# Patient Record
Sex: MALE | Race: BLACK OR AFRICAN AMERICAN | NOT HISPANIC OR LATINO | URBAN - METROPOLITAN AREA
[De-identification: names, ages, dates, MRNs, and addresses within clinical notes are randomized per-mention and may not be internally consistent; named-entity substitution may affect disease eponyms.]

---

## 2017-02-28 ENCOUNTER — EMERGENCY (EMERGENCY)
Facility: HOSPITAL | Age: 62
LOS: 1 days | Discharge: HOME | End: 2017-02-28
Attending: EMERGENCY MEDICINE | Admitting: EMERGENCY MEDICINE
Payer: MEDICAID

## 2017-02-28 VITALS
OXYGEN SATURATION: 100 % | HEART RATE: 73 BPM | TEMPERATURE: 98 F | RESPIRATION RATE: 18 BRPM | DIASTOLIC BLOOD PRESSURE: 76 MMHG | SYSTOLIC BLOOD PRESSURE: 148 MMHG

## 2017-02-28 PROCEDURE — 10061 I&D ABSCESS COMP/MULTIPLE: CPT

## 2017-02-28 PROCEDURE — 99283 EMERGENCY DEPT VISIT LOW MDM: CPT | Mod: 25

## 2017-02-28 RX ADMIN — Medication 300 MILLIGRAM(S): at 15:40

## 2017-02-28 NOTE — ED PROVIDER NOTE - DETAILS:
I've seen and examined the patient and dictated the chart to the scribe, I agree with the documentation.   Resident also on the case, he performed procedure under my supervision.

## 2017-02-28 NOTE — ED PROVIDER NOTE - PLAN OF CARE
-- Return to ED or see your doctor in 2 days for wound recheck and/or packing change.    -- Keep the wound covered and dry.     -- Once a day, wash the wound with soap/water, apply bacitracin or neosporin and re-cover the wound.  -- If you have any worsening of symptoms, including severe pain/swelling/numbness/changes in sensation/weakness, redness which expands more than it is right now or any other concerns please return to the ED immediately.  -- FIll the prescription for Clindamycin 300mg by mouth every 6 hours for 1 week.  Please finish the entire course of medication as prescribed.  If you have any belly pain after the antibiotics, yogurt has been shown to help with this. Do not mix alcohol with this medication.

## 2017-02-28 NOTE — ED PROVIDER NOTE - CARE PLAN
Principal Discharge DX:	Abscess Principal Discharge DX:	Abscess  Instructions for follow-up, activity and diet:	-- Return to ED or see your doctor in 2 days for wound recheck and/or packing change.    -- Keep the wound covered and dry.     -- Once a day, wash the wound with soap/water, apply bacitracin or neosporin and re-cover the wound.  -- If you have any worsening of symptoms, including severe pain/swelling/numbness/changes in sensation/weakness, redness which expands more than it is right now or any other concerns please return to the ED immediately.  -- FIll the prescription for Clindamycin 300mg by mouth every 6 hours for 1 week.  Please finish the entire course of medication as prescribed.  If you have any belly pain after the antibiotics, yogurt has been shown to help with this. Do not mix alcohol with this medication.

## 2017-02-28 NOTE — ED PROVIDER NOTE - OBJECTIVE STATEMENT
63 y/o M pt with PMHx of DM, HTN, and kidney disease presents to the ED for abscess on back x2 weeks. States today abscess started draining by itself. Was evaluated by PCP who sent him to the ED because he also had fever. Pt states last year he had abscess in the same place and he was given abx for it. Denies chill, CP, abd pain, nausea, vomiting or any other symptoms.

## 2017-03-02 ENCOUNTER — EMERGENCY (EMERGENCY)
Facility: HOSPITAL | Age: 62
LOS: 1 days | Discharge: HOME | End: 2017-03-02
Attending: EMERGENCY MEDICINE | Admitting: EMERGENCY MEDICINE

## 2017-03-02 VITALS
DIASTOLIC BLOOD PRESSURE: 80 MMHG | OXYGEN SATURATION: 100 % | SYSTOLIC BLOOD PRESSURE: 148 MMHG | TEMPERATURE: 98 F | RESPIRATION RATE: 16 BRPM | HEART RATE: 72 BPM

## 2017-03-02 NOTE — ED PROVIDER NOTE - SKIN, MLM
4 cm by 4 cm area of induration on back. Packing in place. Mild discoloration, active minimal purulent drainage.

## 2017-03-02 NOTE — ED PROVIDER NOTE - MEDICAL DECISION MAKING DETAILS
61 yo M pt presenting for wound check. Abscess and cellulitis healing nicely. Packing removed. Continue abx. Recommend f/u PMD.

## 2017-03-02 NOTE — ED PROVIDER NOTE - OBJECTIVE STATEMENT
63 yo M pt w/ PMHx of DM, HTN, Kidney Disease presents to the ED for a wound check. Pt was seen in [facility] ED for abscess on back with associated fever x 2 weeks on 2/28/17 and had I&D performed, wound was packed. Pt was DC'ed home on Clindamycin and instructed to return to ED 4 days later for check. Denies fevers, chills, nausea, vomiting, any other symptoms.

## 2017-03-26 ENCOUNTER — EMERGENCY (EMERGENCY)
Facility: HOSPITAL | Age: 62
LOS: 1 days | Discharge: HOME | End: 2017-03-26
Attending: EMERGENCY MEDICINE | Admitting: EMERGENCY MEDICINE
Payer: MEDICAID

## 2017-03-26 VITALS
DIASTOLIC BLOOD PRESSURE: 98 MMHG | SYSTOLIC BLOOD PRESSURE: 192 MMHG | TEMPERATURE: 98 F | RESPIRATION RATE: 18 BRPM | HEART RATE: 79 BPM | OXYGEN SATURATION: 100 %

## 2017-03-26 PROCEDURE — 99283 EMERGENCY DEPT VISIT LOW MDM: CPT | Mod: 25

## 2017-03-26 NOTE — ED PROVIDER NOTE - MEDICAL DECISION MAKING DETAILS
62M in with profuse diarrhea after taking clindamycin for abscess, cbc, cmp, vbg, ivf, c-diff, possible abx, dc home

## 2017-03-26 NOTE — ED ADULT TRIAGE NOTE - CHIEF COMPLAINT QUOTE
pt c/o diarrhea x 1week s/p taking antibiotics for abscess drainage. denies any other symptoms. PMHX: dm, Right BKA, partial blindness.

## 2017-03-26 NOTE — ED PROVIDER NOTE - OBJECTIVE STATEMENT
63 yo M pt w/ PMHx of DM, HTN, Kidney Disease 63 yo M pt w/ PMHx of DM, HTN, Kidney Disease in with 3 days of diarrhea, was recently seed in the ED for back abscess, started on clindamycin which he completed. Endorses some crampy abd pain improved with defecation, also endorses black colored stool although is on pepto bismol as well.  Denies fever, nausea, vomiting, sick contacts, recent travel. 61 yo M pt w/ PMHx of DM, HTN, Kidney Disease in with 3 days of diarrhea, was recently seed in the ED for back abscess, started on clindamycin which he completed. Endorses some crampy abd pain improved with defecation, also endorses black colored stool although is on pepto bismol as well.  Denies fever, nausea, vomiting, sick contacts, recent travel.    Attendinyo male presents with diarrhea since Thursday.  No vomiting.  No abdominal pain.  States having diarrhea which is not pure water.  States it is sometimes semi-solid.  No fever/chills.

## 2017-03-26 NOTE — ED ADULT NURSE NOTE - OBJECTIVE STATEMENT
pt presented to ED with c/o diarrhea x 4 days. pt started antibiotic therapy x 4 days s/p drainage of abscess of back. no c/o pain at this time. pt has AKA.

## 2017-03-27 VITALS
RESPIRATION RATE: 18 BRPM | HEART RATE: 81 BPM | OXYGEN SATURATION: 100 % | DIASTOLIC BLOOD PRESSURE: 87 MMHG | SYSTOLIC BLOOD PRESSURE: 156 MMHG

## 2017-03-27 LAB
ALBUMIN SERPL ELPH-MCNC: 3.9 G/DL — SIGNIFICANT CHANGE UP (ref 3.3–5)
ALP SERPL-CCNC: 81 U/L — SIGNIFICANT CHANGE UP (ref 40–120)
ALT FLD-CCNC: 31 U/L — SIGNIFICANT CHANGE UP (ref 4–41)
AST SERPL-CCNC: 32 U/L — SIGNIFICANT CHANGE UP (ref 4–40)
BASE EXCESS BLDV CALC-SCNC: -1 MMOL/L — SIGNIFICANT CHANGE UP
BASE EXCESS BLDV CALC-SCNC: -1.8 MMOL/L — SIGNIFICANT CHANGE UP
BASOPHILS # BLD AUTO: 0.05 K/UL — SIGNIFICANT CHANGE UP (ref 0–0.2)
BASOPHILS NFR BLD AUTO: 0.6 % — SIGNIFICANT CHANGE UP (ref 0–2)
BILIRUB SERPL-MCNC: 0.4 MG/DL — SIGNIFICANT CHANGE UP (ref 0.2–1.2)
BLOOD GAS VENOUS - CREATININE: 1.31 MG/DL — HIGH (ref 0.5–1.3)
BLOOD GAS VENOUS - CREATININE: 1.39 MG/DL — HIGH (ref 0.5–1.3)
BUN SERPL-MCNC: 24 MG/DL — HIGH (ref 7–23)
CALCIUM SERPL-MCNC: 10 MG/DL — SIGNIFICANT CHANGE UP (ref 8.4–10.5)
CHLORIDE BLDV-SCNC: 106 MMOL/L — SIGNIFICANT CHANGE UP (ref 96–108)
CHLORIDE BLDV-SCNC: 109 MMOL/L — HIGH (ref 96–108)
CHLORIDE SERPL-SCNC: 100 MMOL/L — SIGNIFICANT CHANGE UP (ref 98–107)
CO2 SERPL-SCNC: 16 MMOL/L — LOW (ref 22–31)
CREAT SERPL-MCNC: 1.51 MG/DL — HIGH (ref 0.5–1.3)
EOSINOPHIL # BLD AUTO: 0.27 K/UL — SIGNIFICANT CHANGE UP (ref 0–0.5)
EOSINOPHIL NFR BLD AUTO: 3.1 % — SIGNIFICANT CHANGE UP (ref 0–6)
GAS PNL BLDV: 136 MMOL/L — SIGNIFICANT CHANGE UP (ref 136–146)
GAS PNL BLDV: 137 MMOL/L — SIGNIFICANT CHANGE UP (ref 136–146)
GLUCOSE BLDV-MCNC: 77 — SIGNIFICANT CHANGE UP (ref 70–99)
GLUCOSE BLDV-MCNC: 95 — SIGNIFICANT CHANGE UP (ref 70–99)
GLUCOSE SERPL-MCNC: 94 MG/DL — SIGNIFICANT CHANGE UP (ref 70–99)
HCO3 BLDV-SCNC: 21 MMOL/L — SIGNIFICANT CHANGE UP (ref 20–27)
HCO3 BLDV-SCNC: 23 MMOL/L — SIGNIFICANT CHANGE UP (ref 20–27)
HCT VFR BLD CALC: 33.8 % — LOW (ref 39–50)
HCT VFR BLDV CALC: 35 % — LOW (ref 39–51)
HCT VFR BLDV CALC: 35.4 % — LOW (ref 39–51)
HGB BLD-MCNC: 11.1 G/DL — LOW (ref 13–17)
HGB BLDV-MCNC: 11.4 G/DL — LOW (ref 13–17)
HGB BLDV-MCNC: 11.5 G/DL — LOW (ref 13–17)
IMM GRANULOCYTES NFR BLD AUTO: 0.2 % — SIGNIFICANT CHANGE UP (ref 0–1.5)
LACTATE BLDV-MCNC: 1.8 MMOL/L — SIGNIFICANT CHANGE UP (ref 0.5–2)
LACTATE BLDV-MCNC: 4.3 MMOL/L — CRITICAL HIGH (ref 0.5–2)
LYMPHOCYTES # BLD AUTO: 1.5 K/UL — SIGNIFICANT CHANGE UP (ref 1–3.3)
LYMPHOCYTES # BLD AUTO: 17.2 % — SIGNIFICANT CHANGE UP (ref 13–44)
MCHC RBC-ENTMCNC: 27.6 PG — SIGNIFICANT CHANGE UP (ref 27–34)
MCHC RBC-ENTMCNC: 32.8 % — SIGNIFICANT CHANGE UP (ref 32–36)
MCV RBC AUTO: 84.1 FL — SIGNIFICANT CHANGE UP (ref 80–100)
MONOCYTES # BLD AUTO: 1.2 K/UL — HIGH (ref 0–0.9)
MONOCYTES NFR BLD AUTO: 13.8 % — SIGNIFICANT CHANGE UP (ref 2–14)
NEUTROPHILS # BLD AUTO: 5.68 K/UL — SIGNIFICANT CHANGE UP (ref 1.8–7.4)
NEUTROPHILS NFR BLD AUTO: 65.1 % — SIGNIFICANT CHANGE UP (ref 43–77)
OB PNL STL: NEGATIVE — SIGNIFICANT CHANGE UP
PCO2 BLDV: 44 MMHG — SIGNIFICANT CHANGE UP (ref 41–51)
PCO2 BLDV: 61 MMHG — HIGH (ref 41–51)
PH BLDV: 7.23 PH — LOW (ref 7.32–7.43)
PH BLDV: 7.35 PH — SIGNIFICANT CHANGE UP (ref 7.32–7.43)
PLATELET # BLD AUTO: 261 K/UL — SIGNIFICANT CHANGE UP (ref 150–400)
PMV BLD: 10.2 FL — SIGNIFICANT CHANGE UP (ref 7–13)
PO2 BLDV: 27 MMHG — LOW (ref 35–40)
PO2 BLDV: 31 MMHG — LOW (ref 35–40)
POTASSIUM BLDV-SCNC: 3.9 MMOL/L — SIGNIFICANT CHANGE UP (ref 3.4–4.5)
POTASSIUM BLDV-SCNC: 4.8 MMOL/L — HIGH (ref 3.4–4.5)
POTASSIUM SERPL-MCNC: 4.9 MMOL/L — SIGNIFICANT CHANGE UP (ref 3.5–5.3)
POTASSIUM SERPL-SCNC: 4.9 MMOL/L — SIGNIFICANT CHANGE UP (ref 3.5–5.3)
PROT SERPL-MCNC: 8 G/DL — SIGNIFICANT CHANGE UP (ref 6–8.3)
RBC # BLD: 4.02 M/UL — LOW (ref 4.2–5.8)
RBC # FLD: 13.8 % — SIGNIFICANT CHANGE UP (ref 10.3–14.5)
SAO2 % BLDV: 37 % — LOW (ref 60–85)
SAO2 % BLDV: 55.2 % — LOW (ref 60–85)
SODIUM SERPL-SCNC: 141 MMOL/L — SIGNIFICANT CHANGE UP (ref 135–145)
WBC # BLD: 8.72 K/UL — SIGNIFICANT CHANGE UP (ref 3.8–10.5)
WBC # FLD AUTO: 8.72 K/UL — SIGNIFICANT CHANGE UP (ref 3.8–10.5)

## 2017-03-27 RX ORDER — SODIUM CHLORIDE 9 MG/ML
1000 INJECTION INTRAMUSCULAR; INTRAVENOUS; SUBCUTANEOUS ONCE
Refills: 0 | Status: COMPLETED | OUTPATIENT
Start: 2017-03-27 | End: 2017-03-27

## 2017-03-27 RX ADMIN — SODIUM CHLORIDE 4000 MILLILITER(S): 9 INJECTION INTRAMUSCULAR; INTRAVENOUS; SUBCUTANEOUS at 00:49

## 2017-04-14 ENCOUNTER — EMERGENCY (EMERGENCY)
Facility: HOSPITAL | Age: 62
LOS: 1 days | Discharge: HOME | End: 2017-04-14
Attending: EMERGENCY MEDICINE | Admitting: EMERGENCY MEDICINE
Payer: MEDICAID

## 2017-04-14 VITALS
WEIGHT: 154.1 LBS | OXYGEN SATURATION: 100 % | RESPIRATION RATE: 16 BRPM | HEART RATE: 81 BPM | DIASTOLIC BLOOD PRESSURE: 74 MMHG | SYSTOLIC BLOOD PRESSURE: 175 MMHG

## 2017-04-14 VITALS
RESPIRATION RATE: 18 BRPM | TEMPERATURE: 98 F | DIASTOLIC BLOOD PRESSURE: 88 MMHG | OXYGEN SATURATION: 100 % | SYSTOLIC BLOOD PRESSURE: 172 MMHG | HEART RATE: 79 BPM

## 2017-04-14 LAB
ALBUMIN SERPL ELPH-MCNC: 3.6 G/DL — SIGNIFICANT CHANGE UP (ref 3.3–5)
ALP SERPL-CCNC: 67 U/L — SIGNIFICANT CHANGE UP (ref 40–120)
ALT FLD-CCNC: 17 U/L — SIGNIFICANT CHANGE UP (ref 4–41)
APPEARANCE UR: CLEAR — SIGNIFICANT CHANGE UP
AST SERPL-CCNC: 19 U/L — SIGNIFICANT CHANGE UP (ref 4–40)
BASE EXCESS BLDV CALC-SCNC: 2.4 MMOL/L — SIGNIFICANT CHANGE UP
BASOPHILS # BLD AUTO: 0.03 K/UL — SIGNIFICANT CHANGE UP (ref 0–0.2)
BASOPHILS NFR BLD AUTO: 0.6 % — SIGNIFICANT CHANGE UP (ref 0–2)
BILIRUB SERPL-MCNC: 0.2 MG/DL — SIGNIFICANT CHANGE UP (ref 0.2–1.2)
BILIRUB UR-MCNC: NEGATIVE — SIGNIFICANT CHANGE UP
BLOOD GAS VENOUS - CREATININE: 1.46 MG/DL — HIGH (ref 0.5–1.3)
BLOOD UR QL VISUAL: NEGATIVE — SIGNIFICANT CHANGE UP
BUN SERPL-MCNC: 20 MG/DL — SIGNIFICANT CHANGE UP (ref 7–23)
CALCIUM SERPL-MCNC: 8.7 MG/DL — SIGNIFICANT CHANGE UP (ref 8.4–10.5)
CHLORIDE BLDV-SCNC: 104 MMOL/L — SIGNIFICANT CHANGE UP (ref 96–108)
CHLORIDE SERPL-SCNC: 101 MMOL/L — SIGNIFICANT CHANGE UP (ref 98–107)
CO2 SERPL-SCNC: 28 MMOL/L — SIGNIFICANT CHANGE UP (ref 22–31)
COLOR SPEC: SIGNIFICANT CHANGE UP
CREAT SERPL-MCNC: 1.43 MG/DL — HIGH (ref 0.5–1.3)
EOSINOPHIL # BLD AUTO: 0.25 K/UL — SIGNIFICANT CHANGE UP (ref 0–0.5)
EOSINOPHIL NFR BLD AUTO: 4.7 % — SIGNIFICANT CHANGE UP (ref 0–6)
GAS PNL BLDV: 139 MMOL/L — SIGNIFICANT CHANGE UP (ref 136–146)
GLUCOSE BLDV-MCNC: 160 — HIGH (ref 70–99)
GLUCOSE SERPL-MCNC: 157 MG/DL — HIGH (ref 70–99)
GLUCOSE UR-MCNC: NEGATIVE — SIGNIFICANT CHANGE UP
HCO3 BLDV-SCNC: 25 MMOL/L — SIGNIFICANT CHANGE UP (ref 20–27)
HCT VFR BLD CALC: 30.7 % — LOW (ref 39–50)
HCT VFR BLDV CALC: 30.8 % — LOW (ref 39–51)
HGB BLD-MCNC: 9.8 G/DL — LOW (ref 13–17)
HGB BLDV-MCNC: 10 G/DL — LOW (ref 13–17)
IMM GRANULOCYTES NFR BLD AUTO: 0.2 % — SIGNIFICANT CHANGE UP (ref 0–1.5)
KETONES UR-MCNC: NEGATIVE — SIGNIFICANT CHANGE UP
LACTATE BLDV-MCNC: 2.2 MMOL/L — HIGH (ref 0.5–2)
LEUKOCYTE ESTERASE UR-ACNC: NEGATIVE — SIGNIFICANT CHANGE UP
LYMPHOCYTES # BLD AUTO: 1.64 K/UL — SIGNIFICANT CHANGE UP (ref 1–3.3)
LYMPHOCYTES # BLD AUTO: 30.9 % — SIGNIFICANT CHANGE UP (ref 13–44)
MCHC RBC-ENTMCNC: 27.4 PG — SIGNIFICANT CHANGE UP (ref 27–34)
MCHC RBC-ENTMCNC: 31.9 % — LOW (ref 32–36)
MCV RBC AUTO: 85.8 FL — SIGNIFICANT CHANGE UP (ref 80–100)
MONOCYTES # BLD AUTO: 0.46 K/UL — SIGNIFICANT CHANGE UP (ref 0–0.9)
MONOCYTES NFR BLD AUTO: 8.7 % — SIGNIFICANT CHANGE UP (ref 2–14)
MUCOUS THREADS # UR AUTO: SIGNIFICANT CHANGE UP
NEUTROPHILS # BLD AUTO: 2.92 K/UL — SIGNIFICANT CHANGE UP (ref 1.8–7.4)
NEUTROPHILS NFR BLD AUTO: 54.9 % — SIGNIFICANT CHANGE UP (ref 43–77)
NITRITE UR-MCNC: NEGATIVE — SIGNIFICANT CHANGE UP
PCO2 BLDV: 57 MMHG — HIGH (ref 41–51)
PH BLDV: 7.31 PH — LOW (ref 7.32–7.43)
PH UR: 6 — SIGNIFICANT CHANGE UP (ref 4.6–8)
PLATELET # BLD AUTO: 326 K/UL — SIGNIFICANT CHANGE UP (ref 150–400)
PMV BLD: 10.1 FL — SIGNIFICANT CHANGE UP (ref 7–13)
PO2 BLDV: 25 MMHG — LOW (ref 35–40)
POTASSIUM BLDV-SCNC: 4.2 MMOL/L — SIGNIFICANT CHANGE UP (ref 3.4–4.5)
POTASSIUM SERPL-MCNC: 4.4 MMOL/L — SIGNIFICANT CHANGE UP (ref 3.5–5.3)
POTASSIUM SERPL-SCNC: 4.4 MMOL/L — SIGNIFICANT CHANGE UP (ref 3.5–5.3)
PROT SERPL-MCNC: 7.1 G/DL — SIGNIFICANT CHANGE UP (ref 6–8.3)
PROT UR-MCNC: 10 — SIGNIFICANT CHANGE UP
RBC # BLD: 3.58 M/UL — LOW (ref 4.2–5.8)
RBC # FLD: 13.8 % — SIGNIFICANT CHANGE UP (ref 10.3–14.5)
RBC CASTS # UR COMP ASSIST: SIGNIFICANT CHANGE UP (ref 0–?)
SAO2 % BLDV: 37.2 % — LOW (ref 60–85)
SODIUM SERPL-SCNC: 142 MMOL/L — SIGNIFICANT CHANGE UP (ref 135–145)
SP GR SPEC: 1.01 — SIGNIFICANT CHANGE UP (ref 1–1.03)
SQUAMOUS # UR AUTO: SIGNIFICANT CHANGE UP
UROBILINOGEN FLD QL: NORMAL E.U. — SIGNIFICANT CHANGE UP (ref 0.1–0.2)
WBC # BLD: 5.31 K/UL — SIGNIFICANT CHANGE UP (ref 3.8–10.5)
WBC # FLD AUTO: 5.31 K/UL — SIGNIFICANT CHANGE UP (ref 3.8–10.5)
WBC UR QL: SIGNIFICANT CHANGE UP (ref 0–?)

## 2017-04-14 PROCEDURE — 99285 EMERGENCY DEPT VISIT HI MDM: CPT

## 2017-04-14 RX ORDER — MORPHINE SULFATE 50 MG/1
4 CAPSULE, EXTENDED RELEASE ORAL ONCE
Refills: 0 | Status: DISCONTINUED | OUTPATIENT
Start: 2017-04-14 | End: 2017-04-14

## 2017-04-14 RX ORDER — SODIUM CHLORIDE 9 MG/ML
1000 INJECTION INTRAMUSCULAR; INTRAVENOUS; SUBCUTANEOUS ONCE
Refills: 0 | Status: COMPLETED | OUTPATIENT
Start: 2017-04-14 | End: 2017-04-14

## 2017-04-14 RX ADMIN — SODIUM CHLORIDE 1000 MILLILITER(S): 9 INJECTION INTRAMUSCULAR; INTRAVENOUS; SUBCUTANEOUS at 21:54

## 2017-04-14 NOTE — ED PROVIDER NOTE - PLAN OF CARE
-- Take flagyl (metronidazole) as prescribed. Do not drink alcohol while taking flagyl. Continue taking ciprofloxacin as prescribed.  -- Follow up with a GI doctor within 1 week. See referral list to make an appointment.  -- Return to ER immediately for new or worsening symptoms, any urgent issues, or for any concerns.

## 2017-04-14 NOTE — ED PROVIDER NOTE - CARE PLAN
Principal Discharge DX:	Diarrhea  Instructions for follow-up, activity and diet:	-- Take flagyl (metronidazole) as prescribed. Do not drink alcohol while taking flagyl. Continue taking ciprofloxacin as prescribed.  -- Follow up with a GI doctor within 1 week. See referral list to make an appointment.  -- Return to ER immediately for new or worsening symptoms, any urgent issues, or for any concerns.

## 2017-04-14 NOTE — ED PROVIDER NOTE - OBJECTIVE STATEMENT
62M h/o HTN, HLD, DM, CKD, R BKA (for diabetes-related complications) p/w diarrhea x 1 month. Pt seen in ED 6 weeks ago for abscess, was started on clindamycin. Pt started on cipro 2 weeks ago for diarrhea by PMD. The diarrhea occurs multiple times per day, loose stool, not watery, no blood or mucous. Pt in ED today because he started having abdominal pain this morning. The pain is diffuse, crampy, non-radiating, worse before episode of diarrhea and improved after BM. No fever, cough, cp, sob, n/v, or dysuria. No hx of C diff, international travel, or sick contacts.  PMD: Nishant Colon

## 2017-04-14 NOTE — ED PROVIDER NOTE - PROGRESS NOTE DETAILS
Pt did have any further episodes of diarrhea in the ED. Normal WBC count, so C diff unlikely. Guaiac sent (chaperone: Stacey LARA). Pt feels well. Will d/c with flagyl and GI follow up. Pt given urine cup and instructed to bring stool sample from home if he returns to the ED. Pt did have any further episodes of diarrhea in the ED. Normal WBC count, so C diff unlikely. Rectal exam performed (chaperone: Stacey LARA) - no gross blood, guaiac sent. Pt feels well. Will d/c with flagyl and GI follow up. Pt given urine cup and instructed to bring stool sample from home if he returns to the ED.

## 2017-04-14 NOTE — ED PROVIDER NOTE - ATTENDING CONTRIBUTION TO CARE
Dr. Edwards:  I have personally performed a face to face bedside history and physical examination of this patient. I have discussed the history, examination, review of systems, assessment and plan of management with the resident. I have reviewed the electronic medical record and amended it to reflect my history, review of systems, physical exam, assessment and plan.    62M h/o DM, CKD, R BKA, HTN, presents with diarrhea x 1 mo.  Pt seen in ED 6wks prior for abscess/cellulitis to back and given clindamycin.  Multiple episodes of loose stool a day.  Gvien course of cipro by PCP.  Started having abdominal pain today.      Exam:  - nad  - rrr   -ctab   - abd soft, non-tender    A/P  - consider C diff vs colitis  - cbc, cmp, vbg, c diff  - pain control

## 2017-04-14 NOTE — ED PROVIDER NOTE - MEDICAL DECISION MAKING DETAILS
62M with diarrhea x 1 month associated with crampy abd pain. Concern for c diff. Pt does not appear clinically dehydrated. Low suspicion for kai, appy, diverticulitis, etc. as pt nontender on exam. Plan: labs, c diff toxin, reassess. If c diff +, will start abx and admit.

## 2017-04-15 LAB — OB PNL STL: NEGATIVE — SIGNIFICANT CHANGE UP

## 2017-04-15 RX ORDER — METRONIDAZOLE 500 MG
1 TABLET ORAL
Qty: 21 | Refills: 0
Start: 2017-04-15 | End: 2017-04-22

## 2017-08-15 ENCOUNTER — EMERGENCY (EMERGENCY)
Facility: HOSPITAL | Age: 62
LOS: 1 days | Discharge: HOME | End: 2017-08-15
Attending: EMERGENCY MEDICINE | Admitting: EMERGENCY MEDICINE
Payer: MEDICAID

## 2017-08-15 VITALS
DIASTOLIC BLOOD PRESSURE: 89 MMHG | OXYGEN SATURATION: 100 % | HEART RATE: 72 BPM | SYSTOLIC BLOOD PRESSURE: 187 MMHG | TEMPERATURE: 98 F | RESPIRATION RATE: 18 BRPM

## 2017-08-15 PROCEDURE — 99284 EMERGENCY DEPT VISIT MOD MDM: CPT

## 2017-08-15 RX ORDER — ACETAMINOPHEN 500 MG
650 TABLET ORAL ONCE
Refills: 0 | Status: COMPLETED | OUTPATIENT
Start: 2017-08-15 | End: 2017-08-15

## 2017-08-15 RX ORDER — TETANUS TOXOID, REDUCED DIPHTHERIA TOXOID AND ACELLULAR PERTUSSIS VACCINE, ADSORBED 5; 2.5; 8; 8; 2.5 [IU]/.5ML; [IU]/.5ML; UG/.5ML; UG/.5ML; UG/.5ML
0.5 SUSPENSION INTRAMUSCULAR ONCE
Refills: 0 | Status: COMPLETED | OUTPATIENT
Start: 2017-08-15 | End: 2017-08-15

## 2017-08-15 RX ADMIN — TETANUS TOXOID, REDUCED DIPHTHERIA TOXOID AND ACELLULAR PERTUSSIS VACCINE, ADSORBED 0.5 MILLILITER(S): 5; 2.5; 8; 8; 2.5 SUSPENSION INTRAMUSCULAR at 22:58

## 2017-08-15 RX ADMIN — Medication 650 MILLIGRAM(S): at 22:39

## 2017-08-15 RX ADMIN — Medication 650 MILLIGRAM(S): at 23:41

## 2017-08-15 NOTE — ED PROVIDER NOTE - CARE PLAN
Principal Discharge DX:	Fall  Instructions for follow-up, activity and diet:	The patient was given verbal and written discharge instructions. Specifically, instructions when to return to the ED and when to seek follow-up from their pcp was discussed. Any specialty follow-up was discussed, including how to make an appointment.  Instructions were discussed in simple, plain language and was understood by the patient. The patient understands that should their symptoms worsen or any new symptoms arise, they should return to the ED immediately for further evaluation.

## 2017-08-15 NOTE — ED ADULT NURSE NOTE - OBJECTIVE STATEMENT
patient is a 63 y/o male a&ox4 presenting s/p mechanical fall.  reported striking his head, hematoma noted to back right side of head.  denies loc, n/v, visual disturbance, neck back and spine pain.  pt. grossly non-focal a this time.  matteo, nad, md presently evaluating.

## 2017-08-15 NOTE — ED ADULT NURSE NOTE - CHIEF COMPLAINT QUOTE
Patient arrive from home by ems, he lost his balance and fell tonight, hit the right side of his head on the floor and his right forearm/elbow. Denies any LOC, no chest pain or dizziness. FS =m 134 Right leg prosthesis noted.

## 2017-08-15 NOTE — ED PROVIDER NOTE - OBJECTIVE STATEMENT
61 yo M, bib caregiver after witnessed fall.  Context:  was being helped out of bed by a caregiver (RLE prosthesis; is unsteady at baseline).  The transfer was being assisted by a wrapped bedsheet it unwrapped under the patient's weight, and the patient fell onto floor, hitting back of head.  No LOC.  Mild occipital HA.  Associated Sx:  No N/V/D; no weakness/numbness.  No anticoagulants.  Onset: 1hr PTA.

## 2017-08-15 NOTE — ED ADULT TRIAGE NOTE - CHIEF COMPLAINT QUOTE
Patient arrive from home by ems, he lost his balance and fell tonight, hit the right side of his head and his right forearm/elbow. Denies any LOC, no chest pain or dizziness. FS =m 134 Right leg prosthesis noted. Patient arrive from home by ems, he lost his balance and fell tonight, hit the right side of his head on the floor and his right forearm/elbow. Denies any LOC, no chest pain or dizziness. FS =m 134 Right leg prosthesis noted.

## 2017-08-15 NOTE — ED PROVIDER NOTE - ATTENDING CONTRIBUTION TO CARE
MD Reed:  patient seen and evaluated with the resident.  I was present for key portions of the History & Physical, and I agree with the Impression & Plan.  MD Reed:  63 yo M, presents to ED after witnessed mechanical fall; not on AC, no LOC, but with mild HA.  Neurovascularly intact.  Impression:  closed head injury.  Plan:  CT, wound care R FA abrasions, d/c home if CT wnl. see contribution to care note

## 2017-08-15 NOTE — ED PROVIDER NOTE - MEDICAL DECISION MAKING DETAILS
MD Reed:  61 yo M, presents to ED after witnessed mechanical fall; not on AC, no LOC, but with mild HA.  Neurovascularly intact.  Impression:  closed head injury.  Plan:  CT, wound care R FA abrasions, d/c home if CT wnl.

## 2017-08-15 NOTE — ED PROVIDER NOTE - CONSTITUTIONAL, MLM
normal... Adult M, awake, alert, oriented to person, place, time/situation and in no apparent distress.

## 2017-08-16 VITALS
DIASTOLIC BLOOD PRESSURE: 75 MMHG | SYSTOLIC BLOOD PRESSURE: 169 MMHG | OXYGEN SATURATION: 100 % | HEART RATE: 70 BPM | RESPIRATION RATE: 18 BRPM

## 2017-08-16 PROCEDURE — 70450 CT HEAD/BRAIN W/O DYE: CPT | Mod: 26

## 2017-08-16 PROCEDURE — 72125 CT NECK SPINE W/O DYE: CPT | Mod: 26

## 2018-02-12 ENCOUNTER — INPATIENT (INPATIENT)
Facility: HOSPITAL | Age: 63
LOS: 59 days | Discharge: NURSING, REHAB OR LONG-TERM CARE | End: 2018-04-13
Attending: SURGERY | Admitting: SURGERY
Payer: MEDICAID

## 2018-02-12 VITALS
DIASTOLIC BLOOD PRESSURE: 71 MMHG | RESPIRATION RATE: 16 BRPM | OXYGEN SATURATION: 100 % | TEMPERATURE: 98 F | SYSTOLIC BLOOD PRESSURE: 170 MMHG | HEART RATE: 75 BPM

## 2018-02-12 DIAGNOSIS — I10 ESSENTIAL (PRIMARY) HYPERTENSION: ICD-10-CM

## 2018-02-12 DIAGNOSIS — Z89.511 ACQUIRED ABSENCE OF RIGHT LEG BELOW KNEE: Chronic | ICD-10-CM

## 2018-02-12 DIAGNOSIS — A41.9 SEPSIS, UNSPECIFIED ORGANISM: ICD-10-CM

## 2018-02-12 DIAGNOSIS — E11.8 TYPE 2 DIABETES MELLITUS WITH UNSPECIFIED COMPLICATIONS: ICD-10-CM

## 2018-02-12 DIAGNOSIS — N17.9 ACUTE KIDNEY FAILURE, UNSPECIFIED: ICD-10-CM

## 2018-02-12 DIAGNOSIS — L03.90 CELLULITIS, UNSPECIFIED: ICD-10-CM

## 2018-02-12 DIAGNOSIS — Z29.9 ENCOUNTER FOR PROPHYLACTIC MEASURES, UNSPECIFIED: ICD-10-CM

## 2018-02-12 DIAGNOSIS — L03.818 CELLULITIS OF OTHER SITES: ICD-10-CM

## 2018-02-12 LAB
ALBUMIN SERPL ELPH-MCNC: 4 G/DL — SIGNIFICANT CHANGE UP (ref 3.3–5)
ALP SERPL-CCNC: 100 U/L — SIGNIFICANT CHANGE UP (ref 40–120)
ALT FLD-CCNC: 22 U/L — SIGNIFICANT CHANGE UP (ref 4–41)
AST SERPL-CCNC: 26 U/L — SIGNIFICANT CHANGE UP (ref 4–40)
BASE EXCESS BLDV CALC-SCNC: -3.6 MMOL/L — SIGNIFICANT CHANGE UP
BASOPHILS # BLD AUTO: 0.04 K/UL — SIGNIFICANT CHANGE UP (ref 0–0.2)
BASOPHILS NFR BLD AUTO: 0.3 % — SIGNIFICANT CHANGE UP (ref 0–2)
BILIRUB SERPL-MCNC: 0.3 MG/DL — SIGNIFICANT CHANGE UP (ref 0.2–1.2)
BLOOD GAS VENOUS - CREATININE: 3.92 MG/DL — HIGH (ref 0.5–1.3)
BUN SERPL-MCNC: 56 MG/DL — HIGH (ref 7–23)
CALCIUM SERPL-MCNC: 8.8 MG/DL — SIGNIFICANT CHANGE UP (ref 8.4–10.5)
CHLORIDE BLDV-SCNC: 113 MMOL/L — HIGH (ref 96–108)
CHLORIDE SERPL-SCNC: 104 MMOL/L — SIGNIFICANT CHANGE UP (ref 98–107)
CO2 SERPL-SCNC: 20 MMOL/L — LOW (ref 22–31)
CREAT SERPL-MCNC: 3.81 MG/DL — HIGH (ref 0.5–1.3)
CRP SERPL-MCNC: 110.3 MG/L — HIGH
EOSINOPHIL # BLD AUTO: 0.1 K/UL — SIGNIFICANT CHANGE UP (ref 0–0.5)
EOSINOPHIL NFR BLD AUTO: 0.8 % — SIGNIFICANT CHANGE UP (ref 0–6)
ERYTHROCYTE [SEDIMENTATION RATE] IN BLOOD: 96 MM/HR — HIGH (ref 1–15)
GAS PNL BLDV: 138 MMOL/L — SIGNIFICANT CHANGE UP (ref 136–146)
GLUCOSE BLDV-MCNC: 121 — HIGH (ref 70–99)
GLUCOSE SERPL-MCNC: 131 MG/DL — HIGH (ref 70–99)
GRAM STN SPEC: SIGNIFICANT CHANGE UP
HCO3 BLDV-SCNC: 20 MMOL/L — SIGNIFICANT CHANGE UP (ref 20–27)
HCT VFR BLD CALC: 29.8 % — LOW (ref 39–50)
HCT VFR BLDV CALC: 29.7 % — LOW (ref 39–51)
HGB BLD-MCNC: 9.6 G/DL — LOW (ref 13–17)
HGB BLDV-MCNC: 9.6 G/DL — LOW (ref 13–17)
IMM GRANULOCYTES # BLD AUTO: 0.06 # — SIGNIFICANT CHANGE UP
IMM GRANULOCYTES NFR BLD AUTO: 0.5 % — SIGNIFICANT CHANGE UP (ref 0–1.5)
LACTATE BLDV-MCNC: 3.5 MMOL/L — HIGH (ref 0.5–2)
LYMPHOCYTES # BLD AUTO: 0.83 K/UL — LOW (ref 1–3.3)
LYMPHOCYTES # BLD AUTO: 6.4 % — LOW (ref 13–44)
MCHC RBC-ENTMCNC: 28.7 PG — SIGNIFICANT CHANGE UP (ref 27–34)
MCHC RBC-ENTMCNC: 32.2 % — SIGNIFICANT CHANGE UP (ref 32–36)
MCV RBC AUTO: 89.2 FL — SIGNIFICANT CHANGE UP (ref 80–100)
MONOCYTES # BLD AUTO: 1.07 K/UL — HIGH (ref 0–0.9)
MONOCYTES NFR BLD AUTO: 8.2 % — SIGNIFICANT CHANGE UP (ref 2–14)
NEUTROPHILS # BLD AUTO: 10.94 K/UL — HIGH (ref 1.8–7.4)
NEUTROPHILS NFR BLD AUTO: 83.8 % — HIGH (ref 43–77)
NRBC # FLD: 0 — SIGNIFICANT CHANGE UP
PCO2 BLDV: 60 MMHG — HIGH (ref 41–51)
PH BLDV: 7.21 PH — LOW (ref 7.32–7.43)
PLATELET # BLD AUTO: 197 K/UL — SIGNIFICANT CHANGE UP (ref 150–400)
PMV BLD: 11 FL — SIGNIFICANT CHANGE UP (ref 7–13)
PO2 BLDV: 38 MMHG — SIGNIFICANT CHANGE UP (ref 35–40)
POTASSIUM BLDV-SCNC: 4.6 MMOL/L — HIGH (ref 3.4–4.5)
POTASSIUM SERPL-MCNC: 4.9 MMOL/L — SIGNIFICANT CHANGE UP (ref 3.5–5.3)
POTASSIUM SERPL-SCNC: 4.9 MMOL/L — SIGNIFICANT CHANGE UP (ref 3.5–5.3)
PROT SERPL-MCNC: 8.1 G/DL — SIGNIFICANT CHANGE UP (ref 6–8.3)
RBC # BLD: 3.34 M/UL — LOW (ref 4.2–5.8)
RBC # FLD: 14.1 % — SIGNIFICANT CHANGE UP (ref 10.3–14.5)
SAO2 % BLDV: 59.6 % — LOW (ref 60–85)
SODIUM SERPL-SCNC: 143 MMOL/L — SIGNIFICANT CHANGE UP (ref 135–145)
SPECIMEN SOURCE: SIGNIFICANT CHANGE UP
WBC # BLD: 13.04 K/UL — HIGH (ref 3.8–10.5)
WBC # FLD AUTO: 13.04 K/UL — HIGH (ref 3.8–10.5)

## 2018-02-12 PROCEDURE — 73590 X-RAY EXAM OF LOWER LEG: CPT | Mod: 26,LT

## 2018-02-12 PROCEDURE — 73630 X-RAY EXAM OF FOOT: CPT | Mod: 26,LT

## 2018-02-12 PROCEDURE — 99253 IP/OBS CNSLTJ NEW/EST LOW 45: CPT

## 2018-02-12 PROCEDURE — 73700 CT LOWER EXTREMITY W/O DYE: CPT | Mod: 26,LT

## 2018-02-12 PROCEDURE — 99223 1ST HOSP IP/OBS HIGH 75: CPT | Mod: GC

## 2018-02-12 PROCEDURE — 73610 X-RAY EXAM OF ANKLE: CPT | Mod: 26,LT

## 2018-02-12 RX ORDER — DEXTROSE 50 % IN WATER 50 %
1 SYRINGE (ML) INTRAVENOUS ONCE
Refills: 0 | Status: DISCONTINUED | OUTPATIENT
Start: 2018-02-12 | End: 2018-03-16

## 2018-02-12 RX ORDER — PIPERACILLIN AND TAZOBACTAM 4; .5 G/20ML; G/20ML
3.38 INJECTION, POWDER, LYOPHILIZED, FOR SOLUTION INTRAVENOUS ONCE
Refills: 0 | Status: COMPLETED | OUTPATIENT
Start: 2018-02-12 | End: 2018-02-12

## 2018-02-12 RX ORDER — PIPERACILLIN AND TAZOBACTAM 4; .5 G/20ML; G/20ML
3.38 INJECTION, POWDER, LYOPHILIZED, FOR SOLUTION INTRAVENOUS ONCE
Refills: 0 | Status: DISCONTINUED | OUTPATIENT
Start: 2018-02-12 | End: 2018-02-12

## 2018-02-12 RX ORDER — NIFEDIPINE 30 MG
30 TABLET, EXTENDED RELEASE 24 HR ORAL DAILY
Refills: 0 | Status: DISCONTINUED | OUTPATIENT
Start: 2018-02-12 | End: 2018-02-12

## 2018-02-12 RX ORDER — DEXTROSE 50 % IN WATER 50 %
12.5 SYRINGE (ML) INTRAVENOUS ONCE
Refills: 0 | Status: DISCONTINUED | OUTPATIENT
Start: 2018-02-12 | End: 2018-03-16

## 2018-02-12 RX ORDER — ATORVASTATIN CALCIUM 80 MG/1
40 TABLET, FILM COATED ORAL AT BEDTIME
Refills: 0 | Status: DISCONTINUED | OUTPATIENT
Start: 2018-02-12 | End: 2018-03-05

## 2018-02-12 RX ORDER — VANCOMYCIN HCL 1 G
1000 VIAL (EA) INTRAVENOUS ONCE
Refills: 0 | Status: COMPLETED | OUTPATIENT
Start: 2018-02-12 | End: 2018-02-12

## 2018-02-12 RX ORDER — HEPARIN SODIUM 5000 [USP'U]/ML
5000 INJECTION INTRAVENOUS; SUBCUTANEOUS EVERY 8 HOURS
Refills: 0 | Status: DISCONTINUED | OUTPATIENT
Start: 2018-02-12 | End: 2018-02-18

## 2018-02-12 RX ORDER — PREGABALIN 225 MG/1
1000 CAPSULE ORAL DAILY
Refills: 0 | Status: DISCONTINUED | OUTPATIENT
Start: 2018-02-12 | End: 2018-03-16

## 2018-02-12 RX ORDER — DEXTROSE 50 % IN WATER 50 %
25 SYRINGE (ML) INTRAVENOUS ONCE
Refills: 0 | Status: DISCONTINUED | OUTPATIENT
Start: 2018-02-12 | End: 2018-04-13

## 2018-02-12 RX ORDER — SODIUM CHLORIDE 9 MG/ML
1000 INJECTION INTRAMUSCULAR; INTRAVENOUS; SUBCUTANEOUS ONCE
Refills: 0 | Status: COMPLETED | OUTPATIENT
Start: 2018-02-12 | End: 2018-02-12

## 2018-02-12 RX ORDER — NIFEDIPINE 30 MG
30 TABLET, EXTENDED RELEASE 24 HR ORAL DAILY
Refills: 0 | Status: DISCONTINUED | OUTPATIENT
Start: 2018-02-12 | End: 2018-03-02

## 2018-02-12 RX ORDER — PIPERACILLIN AND TAZOBACTAM 4; .5 G/20ML; G/20ML
INJECTION, POWDER, LYOPHILIZED, FOR SOLUTION INTRAVENOUS
Refills: 0 | Status: DISCONTINUED | OUTPATIENT
Start: 2018-02-12 | End: 2018-02-12

## 2018-02-12 RX ORDER — SODIUM CHLORIDE 9 MG/ML
1000 INJECTION, SOLUTION INTRAVENOUS
Refills: 0 | Status: DISCONTINUED | OUTPATIENT
Start: 2018-02-12 | End: 2018-03-16

## 2018-02-12 RX ORDER — SODIUM CHLORIDE 9 MG/ML
1000 INJECTION INTRAMUSCULAR; INTRAVENOUS; SUBCUTANEOUS ONCE
Refills: 0 | Status: DISCONTINUED | OUTPATIENT
Start: 2018-02-12 | End: 2018-02-13

## 2018-02-12 RX ORDER — ACETAMINOPHEN 500 MG
650 TABLET ORAL EVERY 6 HOURS
Refills: 0 | Status: DISCONTINUED | OUTPATIENT
Start: 2018-02-12 | End: 2018-03-16

## 2018-02-12 RX ORDER — PIPERACILLIN AND TAZOBACTAM 4; .5 G/20ML; G/20ML
3.38 INJECTION, POWDER, LYOPHILIZED, FOR SOLUTION INTRAVENOUS EVERY 12 HOURS
Refills: 0 | Status: DISCONTINUED | OUTPATIENT
Start: 2018-02-12 | End: 2018-02-16

## 2018-02-12 RX ORDER — INSULIN LISPRO 100/ML
VIAL (ML) SUBCUTANEOUS
Refills: 0 | Status: DISCONTINUED | OUTPATIENT
Start: 2018-02-12 | End: 2018-03-16

## 2018-02-12 RX ORDER — LABETALOL HCL 100 MG
100 TABLET ORAL
Refills: 0 | Status: DISCONTINUED | OUTPATIENT
Start: 2018-02-12 | End: 2018-02-12

## 2018-02-12 RX ORDER — GLUCAGON INJECTION, SOLUTION 0.5 MG/.1ML
1 INJECTION, SOLUTION SUBCUTANEOUS ONCE
Refills: 0 | Status: DISCONTINUED | OUTPATIENT
Start: 2018-02-12 | End: 2018-03-16

## 2018-02-12 RX ORDER — LABETALOL HCL 100 MG
100 TABLET ORAL
Refills: 0 | Status: DISCONTINUED | OUTPATIENT
Start: 2018-02-12 | End: 2018-02-15

## 2018-02-12 RX ADMIN — SODIUM CHLORIDE 1000 MILLILITER(S): 9 INJECTION INTRAMUSCULAR; INTRAVENOUS; SUBCUTANEOUS at 20:56

## 2018-02-12 RX ADMIN — Medication 250 MILLIGRAM(S): at 17:49

## 2018-02-12 RX ADMIN — Medication 100 MILLIGRAM(S): at 21:30

## 2018-02-12 RX ADMIN — Medication 650 MILLIGRAM(S): at 21:53

## 2018-02-12 RX ADMIN — PIPERACILLIN AND TAZOBACTAM 200 GRAM(S): 4; .5 INJECTION, POWDER, LYOPHILIZED, FOR SOLUTION INTRAVENOUS at 17:20

## 2018-02-12 RX ADMIN — ATORVASTATIN CALCIUM 40 MILLIGRAM(S): 80 TABLET, FILM COATED ORAL at 21:46

## 2018-02-12 NOTE — H&P ADULT - HISTORY OF PRESENT ILLNESS
63M PMH DM on januvia, FS 80s at home, HTN, CKD, R BKA in 2009 presents with 3 day history of worsening foot pain. Patient reports that 3 days ago he started noticing a blister forming on the bottom of his left foot. Today the blister popped and patient had bloody fluid come out and felt extreme pain. Denies any fevers, chills, nausea, abdominal pain, vomiting. States his R foot had ulcer. Denies history of smoking.   Patient denies any obstructive urinary symptoms: urinates several times per day, no dysuria, hematuria, back pain. Patient follows with his PMD for his diabetic and renal needs. Denies shortness of breath and chest pain.     In ED, CRP found to be 110, ESR 96, WBC 10.94. Lactate 3.5. Creatinine 3.81.

## 2018-02-12 NOTE — H&P ADULT - PROBLEM SELECTOR PLAN 5
-Pt on al medications  -Start on sliding scale.  -Hemoglobin a1c in the morning  -Diabetic precautions

## 2018-02-12 NOTE — H&P ADULT - PROBLEM SELECTOR PLAN 2
-Lactate 3.5, WBC count >12, with source.  -IVF, repeat VBG.  -Start on vancomycin by level, zosyn, and clindamycin.   -Pending blood cx results.

## 2018-02-12 NOTE — H&P ADULT - NSHPLABSRESULTS_GEN_ALL_CORE
(02-12 @ 15:42)                      9.6  13.04 )-----------( 197                 29.8    Neutrophils = 10.94 (83.8%)  Lymphocytes = 0.83 (6.4%)  Eosinophils = 0.10 (0.8%)  Basophils = 0.04 (0.3%)  Monocytes = 1.07 (8.2%)  Bands = --%    02-12    143  |  104  |  56<H>  ----------------------------<  131<H>  4.9   |  20<L>  |  3.81<H>    Ca    8.8      12 Feb 2018 15:42    TPro  8.1  /  Alb  4.0  /  TBili  0.3  /  DBili  x   /  AST  26  /  ALT  22  /  AlkPhos  100  02-12    Venous Blood Gas:  02-12 @ 15:42  7.21/60/38/20/59.6  VBG Lactate: 3.5

## 2018-02-12 NOTE — ED PROVIDER NOTE - OBJECTIVE STATEMENT
62yo male PMH DM, HTN, chronic kidney disease presenting with left foot blister and pain since last night. Patient noticed blister a couple of days ago which ruptured today. no fevers or chills. Patient states that his foot feels painful.

## 2018-02-12 NOTE — CONSULT NOTE ADULT - SUBJECTIVE AND OBJECTIVE BOX
VASCULAR SURGERY CONSULT NOTE  [id number]  ROSCA    HPI  64yo M with h/o diabetes and R BKA presents to ED with L foot blister. The pt reports having constant burning/itching in bilateral LE, but otherwise has minimal sensation. His pain was no worse than usual. He noticed a blister on the bottom of his foot 3 days ago, and it worsened until he presented today. He denies fevers/chills/malaise. He was not aware of a prior wound or trauma to the Left foot.     PAST MEDICAL & SURGICAL HISTORY:  Kidney disease  HTN (hypertension)  DM (diabetes mellitus)    S/P BKA (below knee amputation) unilateral, right      Systemic:	[ ] Fever	[ ] Chills	[ ] Night sweats    [ ] Fatigue	[ ] Other  [] Cardiovascular:  [] Pulmonary:  [] Renal/Urologic:  [] Gastrointestinal:   [] Metabolic:  [] Neurologic:  [] Hematologic:  [] ENT:  [] Ophthalmologic:  [x] Musculoskeletal: peripheral neuropathy    MEDICATIONS  (STANDING):  atorvastatin 40 milliGRAM(s) Oral at bedtime  clindamycin IVPB      cyanocobalamin 1000 MICROGram(s) Oral daily  dextrose 5%. 1000 milliLiter(s) (50 mL/Hr) IV Continuous <Continuous>  dextrose 50% Injectable 12.5 Gram(s) IV Push once  dextrose 50% Injectable 25 Gram(s) IV Push once  dextrose 50% Injectable 25 Gram(s) IV Push once  heparin  Injectable 5000 Unit(s) SubCutaneous every 8 hours  insulin lispro (HumaLOG) corrective regimen sliding scale   SubCutaneous three times a day before meals  labetalol 100 milliGRAM(s) Oral two times a day  NIFEdipine XL 30 milliGRAM(s) Oral daily  piperacillin/tazobactam IVPB. 3.375 Gram(s) IV Intermittent every 12 hours  silver sulfADIAZINE 1% Cream 1 Application(s) Topical daily  sodium chloride 0.9% Bolus 1000 milliLiter(s) IV Bolus once    MEDICATIONS  (PRN):  acetaminophen   Tablet 650 milliGRAM(s) Oral every 6 hours PRN For Temp greater than 38 C (100.4 F)  acetaminophen   Tablet. 650 milliGRAM(s) Oral every 6 hours PRN Moderate Pain (4 - 6)  dextrose Gel 1 Dose(s) Oral once PRN Blood Glucose LESS THAN 70 milliGRAM(s)/deciliter  glucagon  Injectable 1 milliGRAM(s) IntraMuscular once PRN Glucose LESS THAN 70 milligrams/deciliter      Allergies  No Known Allergies    SOCIAL HISTORY: denies smoking or ETOH    FAMILY HISTORY:  Family history of diabetes mellitus (Mother)      Vital Signs Last 24 Hrs  T(C): 37.7 (12 Feb 2018 21:27), Max: 37.7 (12 Feb 2018 21:27)  T(F): 99.8 (12 Feb 2018 21:27), Max: 99.8 (12 Feb 2018 21:27)  HR: 99 (12 Feb 2018 21:27) (72 - 99)  BP: 201/85 (12 Feb 2018 21:27) (170/71 - 201/85)  BP(mean): --  RR: 18 (12 Feb 2018 21:27) (16 - 18)  SpO2: 93% (12 Feb 2018 21:27) (93% - 100%)    PHYSICAL EXAM:    Constitutional: NAD    Eyes: anicteric    ENMT: no rhinorrhea    Neck: supple    Respiratory: Clear bilaterally, respirations not labored, no retractions    Cardiovascular: RRR, NL S1S2    Gastrointestinal: Soft, ND, NT    Genitourinary: Normal external genitalia    Extremities: S/p R BKA well healed    Vascular: Ext. warm, L DP signal, no PT signal    Neurological: sensation and motor intact to all extremities    Skin: warm, dry, desquamation of left plantar surface of foot. Minimal surrounding cellulitis. No crepitus.    Lymph Nodes: no palpable adenopathy      LABS:                        9.6    13.04 )-----------( 197      ( 12 Feb 2018 15:42 )             29.8     02-12    143  |  104  |  56<H>  ----------------------------<  131<H>  4.9   |  20<L>  |  3.81<H>    Ca    8.8      12 Feb 2018 15:42    TPro  8.1  /  Alb  4.0  /  TBili  0.3  /  DBili  x   /  AST  26  /  ALT  22  /  AlkPhos  100  02-12          RADIOLOGY & ADDITIONAL STUDIES:  CT of Lower extremity: no SQ gas,

## 2018-02-12 NOTE — CONSULT NOTE ADULT - SUBJECTIVE AND OBJECTIVE BOX
Patient is a 63y old  Male who presents with a chief complaint of worsening LF infection    HPI: 62yo male PMH DM, HTN, chronic kidney disease presenting with left foot blister and pain since last night. Patient noticed blister a couple of days ago which ruptured today. no fevers or chills. Patient states that his foot feels painful.    Pod consulted for worsening LF blister and infection for 3 days. Pt states he never had any wounds or infection (other than fungal toenail) in his left foot. Per pt, he woke up Friday morning and found his left foot developing blister which worsened over the weekend. Pt states he lost his R leg from really bad smelly infection in 2009 at [facility], has been ambulating with prosthesis since.      PAST MEDICAL & SURGICAL HISTORY:  Kidney disease  HTN (hypertension)  DM (diabetes mellitus)    No significant past surgical history      MEDICATIONS  (STANDING):    MEDICATIONS  (PRN):      Allergies    No Known Allergies    Intolerances        VITALS:    Vital Signs Last 24 Hrs  T(C): 36.8 (12 Feb 2018 17:07), Max: 36.8 (12 Feb 2018 13:48)  T(F): 98.2 (12 Feb 2018 17:07), Max: 98.3 (12 Feb 2018 13:48)  HR: 73 (12 Feb 2018 17:07) (72 - 75)  BP: 186/66 (12 Feb 2018 17:07) (170/71 - 186/66)  BP(mean): --  RR: 18 (12 Feb 2018 17:07) (16 - 18)  SpO2: 100% (12 Feb 2018 17:07) (100% - 100%)    LABS:                          9.6    13.04 )-----------( 197      ( 12 Feb 2018 15:42 )             29.8       02-12    143  |  104  |  56<H>  ----------------------------<  131<H>  4.9   |  20<L>  |  3.81<H>    Ca    8.8      12 Feb 2018 15:42    TPro  8.1  /  Alb  4.0  /  TBili  0.3  /  DBili  x   /  AST  26  /  ALT  22  /  AlkPhos  100  02-12      CAPILLARY BLOOD GLUCOSE      POCT Blood Glucose.: 85 mg/dL (12 Feb 2018 13:54)          LOWER EXTREMITY PHYSICAL EXAM:    Vasular: DP/PT 0/4, B/L, CFT <2 seconds B/L, Temperature gradient warm, B/L.   Neuro: Epicritic sensation absent to the level of toes, B/L.  Musculoskeletal/Ortho:  Skin:  Wound #1:   Location: Left foot deroofed blister with cherry red non-blanchable trophic changes to plantar aspect of toes 1-5, forefoot, midfoot, no ascending erythema or swelling, no malodor, no purulence, no obvious break in skin probing to deeper tissues, etiology unknown, ROM of toes intact, CFT to each toes normal, no sinus tract, no undermining.    RADIOLOGY & ADDITIONAL STUDIES:

## 2018-02-12 NOTE — CONSULT NOTE ADULT - ASSESSMENT
63M LF toes, forefoot, midfoot blister with cellulitis  - Pt seen and evaluated  - LF blister complicated by severe soft tissue infection  - Low concern for OM  - Questionable nec fasc (LRINEC score 4)  - IV abx, wound recommend vanco/zosyn/clinda, high suspicion for group A strep as primary pathogen  - SSD to LF wound  - KENN/PVR ordered, r/o PVD  - F/u XR  - Monitor for worsening appearance of wound, discussed with pt regarding plan, if worsening infection despite IV abx therapy (gangrenous changes, tissue loss), will require OR debridement and possible amputation of involved parts of the foot  - Pt understands  - D/w attending 63M LF toes, forefoot, midfoot blister with cellulitis    - Pt seen and evaluated  - LF blister complicated by severe soft tissue infection  - Low concern for OM  - Questionable nec fasc (LRINEC score 4)  - IV abx, wound recommend vanco/zosyn/clinda, high suspicion for group A strep as primary pathogen  - SSD to LF wound  - KENN/PVR ordered, r/o PVD  - F/u XR  - Monitor for worsening appearance of wound, discussed with pt regarding plan, if worsening infection despite IV abx therapy (gangrenous changes, tissue loss), will require OR debridement and possible amputation of involved parts of the foot  - Pt understands  - D/w attending

## 2018-02-12 NOTE — ED PROVIDER NOTE - ATTENDING CONTRIBUTION TO CARE
Lillian MORAN- 62 Y/O M with h/o htn, dm om januvia, rt aka s/p foot infection, ckd but not on hd p/w left foot redness, pain and swelling which started as a blister and now it popped and has angry red discoloration over base of left foot , pt is able to walk uses rt leg prosthesis. Non smoker, no known trauma, fall. Pt denies any fever, chills, leg pain, groin pain    pt is alert, well appearing male, calm comfortable, s1s2 normal reg, b/l clear breath sounds, abd soft, nt, nd, normal bowel sounds, neuro exam aox3 cn 2-12 intact, skin warm, dry, good turgor, baseline rt aka, left foot plantar aspect proximal aspect with erythema, swellign and surrounding bliste whih has opped, full rom at all joints, good dp and tp pulse on left foot, no crepitus    plan to check labs, xr left foot and leg, start broad spectrum antibiotics, admit for foot infection, given previous amputation, likelihood for poor prognosis and need optimal fluids resuscitation, glucose control and antibiotics, Lillian MORAN- 62 Y/O M with h/o htn, dm om januvia, rt aka s/p foot infection, ckd but not on hd p/w left foot redness, pain and swelling which started as a blister and now it popped and has angry red discoloration over base of left foot , pt is able to walk uses rt leg prosthesis. Non smoker, no known trauma, fall. Pt denies any fever, chills, leg pain, groin pain.    pt is alert, well appearing male, calm comfortable, s1s2 normal reg, b/l clear breath sounds, abd soft, nt, nd, normal bowel sounds, neuro exam aox3 cn 2-12 intact, skin warm, dry, good turgor, baseline rt aka, left foot plantar aspect proximal aspect with erythema, swellign and surrounding bliste whih has opped, full rom at all joints, good dp and tp pulse on left foot, no crepitus    plan to check labs, xr left foot and leg, start broad spectrum antibiotics, admit for foot infection, given previous amputation, likelihood for poor prognosis and need optimal fluids resuscitation, glucose control and antibiotics,

## 2018-02-12 NOTE — H&P ADULT - NSHPPHYSICALEXAM_GEN_ALL_CORE
GENERAL: NAD, well-developed  HEAD:  Atraumatic, Normocephalic  EYES: EOMI, PERRLA, conjunctiva and sclera clear. Moist mucous membranes.  NECK: Supple, No JVD  CHEST/LUNG: Clear to auscultation bilaterally; No wheeze or crackles.  HEART: Regular rate and rhythm; No murmurs, rubs, or gallops  ABDOMEN: Soft, Nontender, Nondistended; Bowel sounds present  EXTREMITIES: R leg: +BKA. LLE: Sole of foot completely denuded from toes to mid-foot without extending erythema. Pulses unable to palpate. Leg with pitting edema up to mid-thigh. No crepitus noted.  PSYCH: AAOx3  NEUROLOGY: non-focal  SKIN: See extremities exam

## 2018-02-12 NOTE — CONSULT NOTE ADULT - VASCULAR DETAILS
moderate arterial insuff w mod trophic skin changes   left toe 1 medial and lateral aspect wound w limited granulation tissue

## 2018-02-12 NOTE — ED ADULT NURSE NOTE - NS ED NOTE ABUSE RESPONSE YN
Serenity Dominique MD [facility]  Obstetrician-gynecologist in [place], [place]  Address: [address] [place] [address]  Phone: [phone]  Phone: (   )    -  Fax: (   )    - Yes

## 2018-02-12 NOTE — ED PROVIDER NOTE - PHYSICAL EXAMINATION
Gen: NAD  Head: NCAT  Lung: CTAB, no respiratory distress, no wheezing, rales, rhonchi  CV: normal s1/s2, rrr, no murmurs, Normal perfusion, pulses 2+ throughout  Abd: soft, NTND  MSK: s/p right AKA, no edema  Neuro: CN II-XII grossly intact, No focal neurologic deficits  Skin: plantar surface of left foot with ruptured bullae and surrounding erythema, warm, tender to palpation, no crepitus   Psych: normal affect

## 2018-02-12 NOTE — ED ADULT NURSE NOTE - OBJECTIVE STATEMENT
Break RN: Pt aox3, baseline ambulatory with a walker at home, rcvd in room 28, c/o wound under L foot since Friday, sts he noticed a blister there Friday and the blister popped Friday night. Pt c/o pain on the area, not being able to sleep d/t pain, no other complaints made, no fever, chills. MD calloway done, 20G placed on R AC, labs sent, VS as noted, NAD. Break RN: Pt aox3, baseline ambulatory with a walker at home, rcvd in room 28, c/o wound under L foot since Friday, sts he noticed a blister there Friday and the blister popped Friday night. Pt c/o pain on the area, not being able to sleep d/t pain, no other complaints made, no fever, chills. MD calloway done, 20G placed on R AC, labs sent, VS as noted, NAD. +R BKA

## 2018-02-12 NOTE — CONSULT NOTE ADULT - ASSESSMENT
62yo M with DM and R BKA, now presenting with Diabetic foot soft tissue infection. No evidence of necrotizing fasciitis or systemic infection.     - Wound care as per podiatry  - KENN/PVR   - Abx  - Will follow   - D/W Dr. Alisia Morales   [id number]

## 2018-02-12 NOTE — H&P ADULT - NSHPREVIEWOFSYSTEMS_GEN_ALL_CORE
CONSTITUTIONAL: No fever, weight loss, or fatigue  EYES: No eye pain, or discharge  ENMT:  No difficulty hearing, tinnitus, vertigo; No sinus or throat pain  NECK: No pain or stiffness  RESPIRATORY: No cough, wheezing, chills or hemoptysis; No shortness of breath  CARDIOVASCULAR: No chest pain, palpitations, dizziness, or leg swelling  GASTROINTESTINAL: No abdominal or epigastric pain. No nausea, vomiting, or hematemesis; No diarrhea or constipation. No melena or hematochezia.  GENITOURINARY: No dysuria, frequency, hematuria, or incontinence  NEUROLOGICAL: No headaches  SKIN: +Blister  MUSCULOSKELETAL: +Foot pain. Denies back pain.  PSYCHIATRIC: No depression, anxiety

## 2018-02-12 NOTE — H&P ADULT - PROBLEM SELECTOR PLAN 3
-JANY on CKD stage III  -Likely pre-renal vs ATN  -IVF. Re-assess BMP  -Renal consult in the morning.

## 2018-02-12 NOTE — H&P ADULT - ASSESSMENT
63M PMH DM on januvia, FS 80s at home, HTN, CKD, R BKA in 2009 presents with 3 day history of worsening foot pain concerning for cellulitis vs necrotizing fasciitis.

## 2018-02-12 NOTE — H&P ADULT - ATTENDING COMMENTS
Patient seen and examined.  Case discussed with house staff.  Agree with above as edited.  Patient with left foot infection with probable severe sepsis and lactic acidosis with JANY on CKD III.  DDx includes necrotizing fasciitis (LRINEC =4), underlying osteomyelitis, underlying abscess, cellultiis.  Urgent imaging of LE  Appreciate podiatry eval.    Start IV vancomycin (by level), zosyn and clindamycin.  ID eval.  Repeat Lactate.  IV fluids  JANY on CKD III - suspect an ATN picture from underlying sepsis - may also be a prerenal component. D/w Renal to evaluate.   DM - ISS for now. check A1C

## 2018-02-12 NOTE — H&P ADULT - PROBLEM SELECTOR PLAN 1
-L foot with cellulitis vs necrotizing fasciitis (LRINEC score 4).  -Start on vancomycin by level, zosyn, and clindamycin.   -Podiatry has evaluated patient and are following. Surgery has been consulted and are aware.  -Wound cultures have been sent.   -Likely ID consult in the morning.  -LLE xrays negative for free air, but will obtain CT to confirm.

## 2018-02-13 DIAGNOSIS — I77.1 STRICTURE OF ARTERY: ICD-10-CM

## 2018-02-13 LAB
ALBUMIN SERPL ELPH-MCNC: 3 G/DL — LOW (ref 3.3–5)
ALP SERPL-CCNC: 82 U/L — SIGNIFICANT CHANGE UP (ref 40–120)
ALT FLD-CCNC: 17 U/L — SIGNIFICANT CHANGE UP (ref 4–41)
AST SERPL-CCNC: 15 U/L — SIGNIFICANT CHANGE UP (ref 4–40)
BASE EXCESS BLDV CALC-SCNC: -5 MMOL/L — SIGNIFICANT CHANGE UP
BASOPHILS # BLD AUTO: 0.03 K/UL — SIGNIFICANT CHANGE UP (ref 0–0.2)
BASOPHILS NFR BLD AUTO: 0.3 % — SIGNIFICANT CHANGE UP (ref 0–2)
BILIRUB SERPL-MCNC: 0.2 MG/DL — SIGNIFICANT CHANGE UP (ref 0.2–1.2)
BUN SERPL-MCNC: 58 MG/DL — HIGH (ref 7–23)
CALCIUM SERPL-MCNC: 7.6 MG/DL — LOW (ref 8.4–10.5)
CHLORIDE SERPL-SCNC: 109 MMOL/L — HIGH (ref 98–107)
CO2 SERPL-SCNC: 18 MMOL/L — LOW (ref 22–31)
CREAT SERPL-MCNC: 3.71 MG/DL — HIGH (ref 0.5–1.3)
EOSINOPHIL # BLD AUTO: 0.19 K/UL — SIGNIFICANT CHANGE UP (ref 0–0.5)
EOSINOPHIL NFR BLD AUTO: 1.8 % — SIGNIFICANT CHANGE UP (ref 0–6)
GAS PNL BLDV: 138 MMOL/L — SIGNIFICANT CHANGE UP (ref 136–146)
GLUCOSE BLDC GLUCOMTR-MCNC: 118 MG/DL — HIGH (ref 70–99)
GLUCOSE BLDC GLUCOMTR-MCNC: 152 MG/DL — HIGH (ref 70–99)
GLUCOSE BLDC GLUCOMTR-MCNC: 192 MG/DL — HIGH (ref 70–99)
GLUCOSE BLDV-MCNC: 105 — HIGH (ref 70–99)
GLUCOSE SERPL-MCNC: 87 MG/DL — SIGNIFICANT CHANGE UP (ref 70–99)
HBA1C BLD-MCNC: 5.9 % — HIGH (ref 4–5.6)
HCO3 BLDV-SCNC: 20 MMOL/L — SIGNIFICANT CHANGE UP (ref 20–27)
HCT VFR BLD CALC: 24.4 % — LOW (ref 39–50)
HCT VFR BLDV CALC: 24.3 % — LOW (ref 39–51)
HGB BLD-MCNC: 7.5 G/DL — LOW (ref 13–17)
HGB BLDV-MCNC: 7.8 G/DL — LOW (ref 13–17)
IMM GRANULOCYTES # BLD AUTO: 0.05 # — SIGNIFICANT CHANGE UP
IMM GRANULOCYTES NFR BLD AUTO: 0.5 % — SIGNIFICANT CHANGE UP (ref 0–1.5)
LACTATE BLDV-MCNC: 0.6 MMOL/L — SIGNIFICANT CHANGE UP (ref 0.5–2)
LYMPHOCYTES # BLD AUTO: 0.95 K/UL — LOW (ref 1–3.3)
LYMPHOCYTES # BLD AUTO: 9 % — LOW (ref 13–44)
MAGNESIUM SERPL-MCNC: 1.9 MG/DL — SIGNIFICANT CHANGE UP (ref 1.6–2.6)
MCHC RBC-ENTMCNC: 27.2 PG — SIGNIFICANT CHANGE UP (ref 27–34)
MCHC RBC-ENTMCNC: 30.7 % — LOW (ref 32–36)
MCV RBC AUTO: 88.4 FL — SIGNIFICANT CHANGE UP (ref 80–100)
MONOCYTES # BLD AUTO: 1.35 K/UL — HIGH (ref 0–0.9)
MONOCYTES NFR BLD AUTO: 12.8 % — SIGNIFICANT CHANGE UP (ref 2–14)
NEUTROPHILS # BLD AUTO: 7.97 K/UL — HIGH (ref 1.8–7.4)
NEUTROPHILS NFR BLD AUTO: 75.6 % — SIGNIFICANT CHANGE UP (ref 43–77)
NRBC # FLD: 0 — SIGNIFICANT CHANGE UP
PCO2 BLDV: 44 MMHG — SIGNIFICANT CHANGE UP (ref 41–51)
PH BLDV: 7.29 PH — LOW (ref 7.32–7.43)
PHOSPHATE SERPL-MCNC: 5 MG/DL — HIGH (ref 2.5–4.5)
PLATELET # BLD AUTO: 155 K/UL — SIGNIFICANT CHANGE UP (ref 150–400)
PMV BLD: 11.2 FL — SIGNIFICANT CHANGE UP (ref 7–13)
PO2 BLDV: 76 MMHG — HIGH (ref 35–40)
POTASSIUM BLDV-SCNC: 4.7 MMOL/L — HIGH (ref 3.4–4.5)
POTASSIUM SERPL-MCNC: 4.7 MMOL/L — SIGNIFICANT CHANGE UP (ref 3.5–5.3)
POTASSIUM SERPL-SCNC: 4.7 MMOL/L — SIGNIFICANT CHANGE UP (ref 3.5–5.3)
PROT SERPL-MCNC: 6.1 G/DL — SIGNIFICANT CHANGE UP (ref 6–8.3)
RBC # BLD: 2.76 M/UL — LOW (ref 4.2–5.8)
RBC # FLD: 14.2 % — SIGNIFICANT CHANGE UP (ref 10.3–14.5)
SAO2 % BLDV: 93.4 % — HIGH (ref 60–85)
SODIUM SERPL-SCNC: 141 MMOL/L — SIGNIFICANT CHANGE UP (ref 135–145)
SPECIMEN SOURCE: SIGNIFICANT CHANGE UP
SPECIMEN SOURCE: SIGNIFICANT CHANGE UP
VANCOMYCIN TROUGH SERPL-MCNC: 12 UG/ML — SIGNIFICANT CHANGE UP (ref 10–20)
WBC # BLD: 10.54 K/UL — HIGH (ref 3.8–10.5)
WBC # FLD AUTO: 10.54 K/UL — HIGH (ref 3.8–10.5)

## 2018-02-13 PROCEDURE — 99222 1ST HOSP IP/OBS MODERATE 55: CPT | Mod: GC

## 2018-02-13 PROCEDURE — 99254 IP/OBS CNSLTJ NEW/EST MOD 60: CPT | Mod: GC

## 2018-02-13 PROCEDURE — 99232 SBSQ HOSP IP/OBS MODERATE 35: CPT

## 2018-02-13 PROCEDURE — 99233 SBSQ HOSP IP/OBS HIGH 50: CPT | Mod: GC

## 2018-02-13 PROCEDURE — 93923 UPR/LXTR ART STDY 3+ LVLS: CPT | Mod: 26

## 2018-02-13 RX ORDER — INFLUENZA VIRUS VACCINE 15; 15; 15; 15 UG/.5ML; UG/.5ML; UG/.5ML; UG/.5ML
0.5 SUSPENSION INTRAMUSCULAR ONCE
Refills: 0 | Status: COMPLETED | OUTPATIENT
Start: 2018-02-13 | End: 2018-02-13

## 2018-02-13 RX ORDER — SODIUM CHLORIDE 9 MG/ML
1000 INJECTION INTRAMUSCULAR; INTRAVENOUS; SUBCUTANEOUS ONCE
Refills: 0 | Status: COMPLETED | OUTPATIENT
Start: 2018-02-13 | End: 2018-02-13

## 2018-02-13 RX ORDER — SODIUM CHLORIDE 9 MG/ML
1000 INJECTION INTRAMUSCULAR; INTRAVENOUS; SUBCUTANEOUS
Refills: 0 | Status: COMPLETED | OUTPATIENT
Start: 2018-02-13 | End: 2018-02-13

## 2018-02-13 RX ORDER — VANCOMYCIN HCL 1 G
1000 VIAL (EA) INTRAVENOUS ONCE
Refills: 0 | Status: COMPLETED | OUTPATIENT
Start: 2018-02-13 | End: 2018-02-13

## 2018-02-13 RX ADMIN — Medication 100 MILLIGRAM(S): at 13:54

## 2018-02-13 RX ADMIN — PIPERACILLIN AND TAZOBACTAM 25 GRAM(S): 4; .5 INJECTION, POWDER, LYOPHILIZED, FOR SOLUTION INTRAVENOUS at 17:24

## 2018-02-13 RX ADMIN — HEPARIN SODIUM 5000 UNIT(S): 5000 INJECTION INTRAVENOUS; SUBCUTANEOUS at 13:54

## 2018-02-13 RX ADMIN — Medication 100 MILLIGRAM(S): at 05:32

## 2018-02-13 RX ADMIN — HEPARIN SODIUM 5000 UNIT(S): 5000 INJECTION INTRAVENOUS; SUBCUTANEOUS at 05:33

## 2018-02-13 RX ADMIN — SODIUM CHLORIDE 75 MILLILITER(S): 9 INJECTION INTRAMUSCULAR; INTRAVENOUS; SUBCUTANEOUS at 17:25

## 2018-02-13 RX ADMIN — SODIUM CHLORIDE 500 MILLILITER(S): 9 INJECTION INTRAMUSCULAR; INTRAVENOUS; SUBCUTANEOUS at 05:00

## 2018-02-13 RX ADMIN — Medication 1 APPLICATION(S): at 17:24

## 2018-02-13 RX ADMIN — SODIUM CHLORIDE 75 MILLILITER(S): 9 INJECTION INTRAMUSCULAR; INTRAVENOUS; SUBCUTANEOUS at 06:24

## 2018-02-13 RX ADMIN — Medication 30 MILLIGRAM(S): at 05:32

## 2018-02-13 RX ADMIN — HEPARIN SODIUM 5000 UNIT(S): 5000 INJECTION INTRAVENOUS; SUBCUTANEOUS at 22:01

## 2018-02-13 RX ADMIN — HEPARIN SODIUM 5000 UNIT(S): 5000 INJECTION INTRAVENOUS; SUBCUTANEOUS at 00:45

## 2018-02-13 RX ADMIN — ATORVASTATIN CALCIUM 40 MILLIGRAM(S): 80 TABLET, FILM COATED ORAL at 22:01

## 2018-02-13 RX ADMIN — Medication 100 MILLIGRAM(S): at 23:20

## 2018-02-13 RX ADMIN — PREGABALIN 1000 MICROGRAM(S): 225 CAPSULE ORAL at 12:07

## 2018-02-13 RX ADMIN — Medication 1: at 12:20

## 2018-02-13 RX ADMIN — PIPERACILLIN AND TAZOBACTAM 25 GRAM(S): 4; .5 INJECTION, POWDER, LYOPHILIZED, FOR SOLUTION INTRAVENOUS at 06:17

## 2018-02-13 RX ADMIN — Medication 100 MILLIGRAM(S): at 17:24

## 2018-02-13 RX ADMIN — Medication 250 MILLIGRAM(S): at 12:07

## 2018-02-13 RX ADMIN — Medication 100 MILLIGRAM(S): at 05:34

## 2018-02-13 NOTE — PROGRESS NOTE ADULT - ASSESSMENT
62yo M with DM and R BKA, now presenting with Diabetic foot soft tissue infection. No evidence of necrotizing fasciitis or systemic infection.     - Wound care as per podiatry  -pt  will require  lle angio however there is sig risk of renal failure   will d/w renal and pt this  will follow

## 2018-02-13 NOTE — PROGRESS NOTE ADULT - SUBJECTIVE AND OBJECTIVE BOX
Patient is a 63y old  Male who presents with a chief complaint of 63M PMH DM, HTN, CKD p/w L foot pain x 3 days. (12 Feb 2018 19:33)      Vascular Surgery Attending Progress Note    Interval HPI: pt w/o c/o    Medications:  acetaminophen   Tablet 650 milliGRAM(s) Oral every 6 hours PRN  acetaminophen   Tablet. 650 milliGRAM(s) Oral every 6 hours PRN  atorvastatin 40 milliGRAM(s) Oral at bedtime  clindamycin IVPB 900 milliGRAM(s) IV Intermittent every 8 hours  clindamycin IVPB      cyanocobalamin 1000 MICROGram(s) Oral daily  dextrose 5%. 1000 milliLiter(s) IV Continuous <Continuous>  dextrose 50% Injectable 12.5 Gram(s) IV Push once  dextrose 50% Injectable 25 Gram(s) IV Push once  dextrose 50% Injectable 25 Gram(s) IV Push once  dextrose Gel 1 Dose(s) Oral once PRN  glucagon  Injectable 1 milliGRAM(s) IntraMuscular once PRN  heparin  Injectable 5000 Unit(s) SubCutaneous every 8 hours  influenza   Vaccine 0.5 milliLiter(s) IntraMuscular once  insulin lispro (HumaLOG) corrective regimen sliding scale   SubCutaneous three times a day before meals  labetalol 100 milliGRAM(s) Oral two times a day  NIFEdipine XL 30 milliGRAM(s) Oral daily  piperacillin/tazobactam IVPB. 3.375 Gram(s) IV Intermittent every 12 hours  silver sulfADIAZINE 1% Cream 1 Application(s) Topical daily      Vital Signs Last 24 Hrs  T(C): 36.9 (13 Feb 2018 14:30), Max: 37.7 (12 Feb 2018 21:27)  T(F): 98.5 (13 Feb 2018 14:30), Max: 99.9 (12 Feb 2018 22:35)  HR: 79 (13 Feb 2018 17:23) (73 - 99)  BP: 126/56 (13 Feb 2018 17:23) (126/56 - 201/85)  BP(mean): --  RR: 18 (13 Feb 2018 17:23) (16 - 18)  SpO2: 96% (13 Feb 2018 17:23) (93% - 100%)  I&O's Summary      Physical Exam:  Neuro  A&Ox3 VSS  Vascular:  lt toe 1 wound limited healing , dressing c/d/i  Musculoskeletal: wnl    LABS:                        7.5    10.54 )-----------( 155      ( 13 Feb 2018 07:11 )             24.4     02-13    141  |  109<H>  |  58<H>  ----------------------------<  87  4.7   |  18<L>  |  3.71<H>    Ca    7.6<L>      13 Feb 2018 07:11  Phos  5.0     02-13  Mg     1.9     02-13    TPro  6.1  /  Alb  3.0<L>  /  TBili  0.2  /  DBili  x   /  AST  15  /  ALT  17  /  AlkPhos  82  02-13    KENN/PVR reviewed     BARI NICOLAS MD  [phone]

## 2018-02-13 NOTE — CONSULT NOTE ADULT - ASSESSMENT
63M PMH DM on januvia, FS 80s at home, HTN, CKD, R BKA in 2009 presents with 3 day history of worsening foot pain concerning for cellulitis vs osteo, currently stable.    Problem/Plan - 1:  ·  Problem: Cellulitis of other specified site.  Plan: -L foot with cellulitis  - Improvement on IV abx  - Podiatry has minimal concern for deep infectious process at this time, may need vasc w/u    Problem/Plan - 2:  ·  Problem: Sepsis, due to unspecified organism.  Plan: -Lactate 3.5, WBC count >12, with source.  -Pending blood cx results. 63M PMH DM on januvia, FS 80s at home, HTN, CKD, R BKA in 2009 presents with 3 day history of worsening foot pain concerning for localized cellulitis w/ likely avascular changes, currently stable.    Problem/Plan - 1:  ·  Problem: Cellulitis of other specified site.  Plan: -L foot with cellulitis  - Improvement on IV abx  - Podiatry has minimal concern for deep infectious process at this time, may need vasc w/u  - CT read pending    Problem/Plan - 2:  ·  Problem: Sepsis, due to unspecified organism.  Plan: -Lactate 3.5, WBC count >12, with source.  -Pending blood cx results will c/w IV abx   -Cultures growing GPCPR at this time 63M long standing DM  (A1C was 5.9), HTN, CKD, R BKA in 2009 presents with 3 day history of worsening foot pain.  Noted to have extensive blistering of the plantar foot which was deroofed and swabbed.   So far with GPC pairs.  Does not appear overtly infected, however, in light of the pain and leukocytosis, can give short course of antibiotics for possible superinfection.  Otherwise, clinically stable.    Suggest:  - stop clindamycin  - vancomycin/zosyn pending cultures  - f/u blood cultures  - f/u CT leg/foot

## 2018-02-13 NOTE — PROGRESS NOTE ADULT - ATTENDING COMMENTS
Patient seen and examined.  Case discussed with house staff.  Agree with above as edited.  Patient with left foot infection with probable severe sepsis and lactic acidosis with JANY on CKD III.  Vascular, podiatry and infectious disease input appreciated.  Awaiting CT reading. If no definitive dx identified, would consider MRI to evaluate for acute osteomyelitis.  Cont IV vancomycin (by level), zosyn and clindamycin.   JANY on CKD III - suspect an ATN from underlying sepsis - may also be a prerenal component.   Appreciate renal consult.     DM - ISS for now. check A1C

## 2018-02-13 NOTE — PROGRESS NOTE ADULT - ASSESSMENT
63M LF toes, forefoot, midfoot blister with cellulitis  - Pt seen and evaluated  - Appearance improving, erythema and edema improved   - Low concern for OM  - Low concern for nec fasc  - Cont IV abx  - SSD to LF wound daily  - KENN/PVR pending  - F/u XR read, preliminary report normal  - No pod sx intervention at this time. Cont IV abx and SSD to wounds daily  - Seen and discussed w/ attending who is in agreement 63M LF toes, forefoot, midfoot blister with cellulitis    - Pt seen and evaluated  - Appearance improving, erythema and edema improved   - Low concern for OM  - Low concern for nec fasc  - Cont IV abx  - SSD to LF wound daily  - KENN/PVR pending  - F/u XR read, preliminary report normal  - No pod sx intervention at this time. Cont IV abx and SSD to wounds daily  - Seen and discussed w/ attending who is in agreement

## 2018-02-13 NOTE — PROGRESS NOTE ADULT - SUBJECTIVE AND OBJECTIVE BOX
[facility] DIVISION OF KIDNEY DISEASES AND HYPERTENSION -- INITIAL CONSULT NOTE  --------------------------------------------------------------------------------  HPI:        PAST HISTORY  --------------------------------------------------------------------------------  PAST MEDICAL & SURGICAL HISTORY:  Kidney disease  HTN (hypertension)  DM (diabetes mellitus)  S/P BKA (below knee amputation) unilateral, right    FAMILY HISTORY:  Family history of diabetes mellitus (Mother)    PAST SOCIAL HISTORY:    ALLERGIES & MEDICATIONS  --------------------------------------------------------------------------------  Allergies    No Known Allergies    Intolerances      Standing Inpatient Medications  atorvastatin 40 milliGRAM(s) Oral at bedtime  clindamycin IVPB 900 milliGRAM(s) IV Intermittent every 8 hours  clindamycin IVPB      cyanocobalamin 1000 MICROGram(s) Oral daily  dextrose 5%. 1000 milliLiter(s) IV Continuous <Continuous>  dextrose 50% Injectable 12.5 Gram(s) IV Push once  dextrose 50% Injectable 25 Gram(s) IV Push once  dextrose 50% Injectable 25 Gram(s) IV Push once  heparin  Injectable 5000 Unit(s) SubCutaneous every 8 hours  influenza   Vaccine 0.5 milliLiter(s) IntraMuscular once  insulin lispro (HumaLOG) corrective regimen sliding scale   SubCutaneous three times a day before meals  labetalol 100 milliGRAM(s) Oral two times a day  NIFEdipine XL 30 milliGRAM(s) Oral daily  piperacillin/tazobactam IVPB. 3.375 Gram(s) IV Intermittent every 12 hours  silver sulfADIAZINE 1% Cream 1 Application(s) Topical daily  sodium chloride 0.9%. 1000 milliLiter(s) IV Continuous <Continuous>    PRN Inpatient Medications  acetaminophen   Tablet 650 milliGRAM(s) Oral every 6 hours PRN  acetaminophen   Tablet. 650 milliGRAM(s) Oral every 6 hours PRN  dextrose Gel 1 Dose(s) Oral once PRN  glucagon  Injectable 1 milliGRAM(s) IntraMuscular once PRN      REVIEW OF SYSTEMS  --------------------------------------------------------------------------------  Gen: No weight changes, fatigue, fevers/chills, weakness  Skin: No rashes  Head/Eyes/Ears/Mouth: No headache; Normal hearing; Normal vision w/o blurriness; No sinus pain/discomfort, sore throat  Respiratory: No dyspnea, cough, wheezing, hemoptysis  CV: No chest pain, PND, orthopnea  GI: No abdominal pain, diarrhea, constipation, nausea, vomiting, melena, hematochezia  : No increased frequency, dysuria, hematuria, nocturia  MSK: No joint pain/swelling; no back pain; no edema  Neuro: No dizziness/lightheadedness, weakness, seizures, numbness, tingling  Heme: No easy bruising or bleeding  Endo: No heat/cold intolerance  Psych: No significant nervousness, anxiety, stress, depression    All other systems were reviewed and are negative, except as noted.    VITALS/PHYSICAL EXAM  --------------------------------------------------------------------------------  T(C): 36.8 (02-13-18 @ 05:18), Max: 37.7 (02-12-18 @ 21:27)  HR: 74 (02-13-18 @ 05:18) (72 - 99)  BP: 147/64 (02-13-18 @ 05:18) (143/63 - 201/85)  RR: 18 (02-13-18 @ 05:18) (16 - 18)  SpO2: 100% (02-13-18 @ 05:18) (93% - 100%)  Wt(kg): --        Physical Exam:  	Gen: NAD, well-appearing  	HEENT: PERRL, supple neck, clear oropharynx  	Pulm: CTA B/L  	CV: RRR, S1S2; no rub  	Back: No spinal or CVA tenderness; no sacral edema  	Abd: +BS, soft, nontender/nondistended  	: No suprapubic tenderness  	UE: Warm, FROM, no clubbing, intact strength; no edema; no asterixis  	LE: Warm, FROM, no clubbing, intact strength; no edema  	Neuro: No focal deficits, intact gait  	Psych: Normal affect and mood  	Skin: Warm, without rashes  	Vascular access:    LABS/STUDIES  --------------------------------------------------------------------------------              7.5    10.54 >-----------<  155      [02-13-18 @ 07:11]              24.4     141  |  109  |  58  ----------------------------<  87      [02-13-18 @ 07:11]  4.7   |  18  |  3.71        Ca     7.6     [02-13-18 @ 07:11]      Mg     1.9     [02-13-18 @ 07:11]      Phos  5.0     [02-13-18 @ 07:11]    TPro  6.1  /  Alb  3.0  /  TBili  0.2  /  DBili  x   /  AST  15  /  ALT  17  /  AlkPhos  82  [02-13-18 @ 07:11]          Creatinine Trend:  SCr 3.71 [02-13 @ 07:11]  SCr 3.81 [02-12 @ 15:42]    Urinalysis - [04-14-17 @ 22:50]      Color COLORL / Appearance CLEAR / SG 1.007 / pH 6.0      Gluc NEGATIVE / Ketone NEGATIVE  / Bili NEGATIVE / Urobili NORMAL       Blood NEGATIVE / Protein 10 / Leuk Est NEGATIVE / Nitrite NEGATIVE      RBC 0-2 / WBC 0-2 / Hyaline  / Gran  / Sq Epi OCC / Non Sq Epi  / Bacteria       HbA1c 5.9      [02-13-18 @ 07:11] [facility] DIVISION OF KIDNEY DISEASES AND HYPERTENSION -- INITIAL CONSULT NOTE  --------------------------------------------------------------------------------  HPI:    64 y/o male with hx of CKD (unknown stage), HTN, (DM (HbA1C 5.9), R. BKA from ulcer in 2009, who p/w worsening left foot pain of 3 day duration. Pt. had noticed a blister on the bottom of his left foot, which popped caused him extreme pain prompting him to come to the ED. On admission pt. noted to have JANY (on CKD) with creatinine of 3.81. Pt. endorses being told by his PMD he had renal disease but unsure what stage. He is urinates several times daily, denies dysuria, feeling of inability to fully empty bladder, nocturia, hematuria, foamy urine, fevers, cp, SOB, orthopnea, chills, n/v/d.     In the ED, pt. received 2L NS bolus, vancomycin x1 and zosyn x1, currently continued on clindamycin and zosyn.         PAST HISTORY  --------------------------------------------------------------------------------  PAST MEDICAL & SURGICAL HISTORY:  Kidney disease  HTN (hypertension)  DM (diabetes mellitus)  S/P BKA (below knee amputation) unilateral, right    FAMILY HISTORY:  Family history of diabetes mellitus (Mother)    PAST SOCIAL HISTORY:    ALLERGIES & MEDICATIONS  --------------------------------------------------------------------------------  Allergies    No Known Allergies    Intolerances      Standing Inpatient Medications  atorvastatin 40 milliGRAM(s) Oral at bedtime  clindamycin IVPB 900 milliGRAM(s) IV Intermittent every 8 hours  clindamycin IVPB      cyanocobalamin 1000 MICROGram(s) Oral daily  dextrose 5%. 1000 milliLiter(s) IV Continuous <Continuous>  dextrose 50% Injectable 12.5 Gram(s) IV Push once  dextrose 50% Injectable 25 Gram(s) IV Push once  dextrose 50% Injectable 25 Gram(s) IV Push once  heparin  Injectable 5000 Unit(s) SubCutaneous every 8 hours  influenza   Vaccine 0.5 milliLiter(s) IntraMuscular once  insulin lispro (HumaLOG) corrective regimen sliding scale   SubCutaneous three times a day before meals  labetalol 100 milliGRAM(s) Oral two times a day  NIFEdipine XL 30 milliGRAM(s) Oral daily  piperacillin/tazobactam IVPB. 3.375 Gram(s) IV Intermittent every 12 hours  silver sulfADIAZINE 1% Cream 1 Application(s) Topical daily  sodium chloride 0.9%. 1000 milliLiter(s) IV Continuous <Continuous>    PRN Inpatient Medications  acetaminophen   Tablet 650 milliGRAM(s) Oral every 6 hours PRN  acetaminophen   Tablet. 650 milliGRAM(s) Oral every 6 hours PRN  dextrose Gel 1 Dose(s) Oral once PRN  glucagon  Injectable 1 milliGRAM(s) IntraMuscular once PRN      REVIEW OF SYSTEMS  --------------------------------------------------------------------------------  Gen: No weight changes, fatigue, fevers/chills, weakness  Skin: No rashes  Head/Eyes/Ears/Mouth: No headache; Normal hearing; Normal vision w/o blurriness; No sinus pain/discomfort, sore throat  Respiratory: No dyspnea, cough, wheezing, hemoptysis  CV: No chest pain, PND, orthopnea  GI: No abdominal pain, diarrhea, constipation, nausea, vomiting, melena, hematochezia  : No increased frequency, dysuria, hematuria, nocturia  MSK: No joint pain/swelling; no back pain; no edema  Neuro: No dizziness/lightheadedness, weakness, seizures, numbness, tingling  Heme: No easy bruising or bleeding  Endo: No heat/cold intolerance  Psych: No significant nervousness, anxiety, stress, depression    All other systems were reviewed and are negative, except as noted.    VITALS/PHYSICAL EXAM  --------------------------------------------------------------------------------  T(C): 36.8 (02-13-18 @ 05:18), Max: 37.7 (02-12-18 @ 21:27)  HR: 74 (02-13-18 @ 05:18) (72 - 99)  BP: 147/64 (02-13-18 @ 05:18) (143/63 - 201/85)  RR: 18 (02-13-18 @ 05:18) (16 - 18)  SpO2: 100% (02-13-18 @ 05:18) (93% - 100%)  Wt(kg): --    Physical Exam:  	Gen: NAD, well-appearing  	HEENT: PERRL, supple neck, clear oropharynx  	Pulm: CTA B/L  	CV: RRR, S1S2; no rub  	Back: No spinal or CVA tenderness; no sacral edema  	Abd: +BS, soft, nontender/nondistended  	: No suprapubic tenderness  	UE: Warm, FROM, no clubbing, intact strength; no edema; no asterixis  	LE: Warm, FROM, no clubbing, intact strength; no edema  	Neuro: No focal deficits, intact gait  	Psych: Normal affect and mood  	Skin: Warm, without rashes  	Vascular access:    LABS/STUDIES  --------------------------------------------------------------------------------              7.5    10.54 >-----------<  155      [02-13-18 @ 07:11]              24.4     141  |  109  |  58  ----------------------------<  87      [02-13-18 @ 07:11]  4.7   |  18  |  3.71        Ca     7.6     [02-13-18 @ 07:11]      Mg     1.9     [02-13-18 @ 07:11]      Phos  5.0     [02-13-18 @ 07:11]    TPro  6.1  /  Alb  3.0  /  TBili  0.2  /  DBili  x   /  AST  15  /  ALT  17  /  AlkPhos  82  [02-13-18 @ 07:11]          Creatinine Trend:  SCr 3.71 [02-13 @ 07:11]  SCr 3.81 [02-12 @ 15:42]    Urinalysis - [04-14-17 @ 22:50]      Color COLORL / Appearance CLEAR / SG 1.007 / pH 6.0      Gluc NEGATIVE / Ketone NEGATIVE  / Bili NEGATIVE / Urobili NORMAL       Blood NEGATIVE / Protein 10 / Leuk Est NEGATIVE / Nitrite NEGATIVE      RBC 0-2 / WBC 0-2 / Hyaline  / Gran  / Sq Epi OCC / Non Sq Epi  / Bacteria       HbA1c 5.9      [02-13-18 @ 07:11] [facility] DIVISION OF KIDNEY DISEASES AND HYPERTENSION -- INITIAL CONSULT NOTE  --------------------------------------------------------------------------------  HPI:    62 y/o male with hx of CKD (unknown stage), HTN, (DM (HbA1C 5.9), R. BKA from ulcer in 2009, who p/w worsening left foot pain of 3 day duration. Pt. had noticed a blister on the bottom of his left foot, which popped caused him extreme pain prompting him to come to the ED. On admission pt. noted to have JANY (on CKD) with creatinine of 3.81. Pt. endorses being told by his PMD he had renal disease but unsure what stage. He is urinates several times daily, denies dysuria, feeling of inability to fully empty bladder, nocturia, hematuria, foamy urine, fevers, cp, SOB, orthopnea, chills, n/v/d.     In the ED, pt. received 2L NS bolus, vancomycin x1 and zosyn x1, currently continued on clindamycin and zosyn.         PAST HISTORY  --------------------------------------------------------------------------------  PAST MEDICAL & SURGICAL HISTORY:  Kidney disease  HTN (hypertension)  DM (diabetes mellitus)  S/P BKA (below knee amputation) unilateral, right    FAMILY HISTORY:  Family history of diabetes mellitus (Mother)    PAST SOCIAL HISTORY:    ALLERGIES & MEDICATIONS  --------------------------------------------------------------------------------  Allergies    No Known Allergies    Intolerances      Standing Inpatient Medications  atorvastatin 40 milliGRAM(s) Oral at bedtime  clindamycin IVPB 900 milliGRAM(s) IV Intermittent every 8 hours  clindamycin IVPB      cyanocobalamin 1000 MICROGram(s) Oral daily  dextrose 5%. 1000 milliLiter(s) IV Continuous <Continuous>  dextrose 50% Injectable 12.5 Gram(s) IV Push once  dextrose 50% Injectable 25 Gram(s) IV Push once  dextrose 50% Injectable 25 Gram(s) IV Push once  heparin  Injectable 5000 Unit(s) SubCutaneous every 8 hours  influenza   Vaccine 0.5 milliLiter(s) IntraMuscular once  insulin lispro (HumaLOG) corrective regimen sliding scale   SubCutaneous three times a day before meals  labetalol 100 milliGRAM(s) Oral two times a day  NIFEdipine XL 30 milliGRAM(s) Oral daily  piperacillin/tazobactam IVPB. 3.375 Gram(s) IV Intermittent every 12 hours  silver sulfADIAZINE 1% Cream 1 Application(s) Topical daily  sodium chloride 0.9%. 1000 milliLiter(s) IV Continuous <Continuous>    PRN Inpatient Medications  acetaminophen   Tablet 650 milliGRAM(s) Oral every 6 hours PRN  acetaminophen   Tablet. 650 milliGRAM(s) Oral every 6 hours PRN  dextrose Gel 1 Dose(s) Oral once PRN  glucagon  Injectable 1 milliGRAM(s) IntraMuscular once PRN      REVIEW OF SYSTEMS  --------------------------------------------------------------------------------    All other systems were reviewed and are negative, except as noted.    VITALS/PHYSICAL EXAM  --------------------------------------------------------------------------------  T(C): 36.8 (02-13-18 @ 05:18), Max: 37.7 (02-12-18 @ 21:27)  HR: 74 (02-13-18 @ 05:18) (72 - 99)  BP: 147/64 (02-13-18 @ 05:18) (143/63 - 201/85)  RR: 18 (02-13-18 @ 05:18) (16 - 18)  SpO2: 100% (02-13-18 @ 05:18) (93% - 100%)  Wt(kg): --    Physical Exam:  	Gen: NAD, well-appearing  	HEENT: PERRL, supple neck, clear oropharynx  	Pulm: CTA B/L  	CV: RRR, S1S2; no rub  	Back: No spinal or CVA tenderness; no sacral edema  	Abd: +BS, soft, nontender/nondistended  	: No suprapubic tenderness  	UE: Warm, FROM, no clubbing, intact strength; no edema; no asterixis  	LE: R . BKA, FROM, no clubbing, intact strength; no edema  	Neuro: No focal deficits, intact gait  	Psych: Normal affect and mood  	Skin: Warm, without rashes  	Vascular access:    LABS/STUDIES  --------------------------------------------------------------------------------              7.5    10.54 >-----------<  155      [02-13-18 @ 07:11]              24.4     141  |  109  |  58  ----------------------------<  87      [02-13-18 @ 07:11]  4.7   |  18  |  3.71        Ca     7.6     [02-13-18 @ 07:11]      Mg     1.9     [02-13-18 @ 07:11]      Phos  5.0     [02-13-18 @ 07:11]    TPro  6.1  /  Alb  3.0  /  TBili  0.2  /  DBili  x   /  AST  15  /  ALT  17  /  AlkPhos  82  [02-13-18 @ 07:11]          Creatinine Trend:  SCr 3.71 [02-13 @ 07:11]  SCr 3.81 [02-12 @ 15:42]    Urinalysis - [04-14-17 @ 22:50]      Color COLORL / Appearance CLEAR / SG 1.007 / pH 6.0      Gluc NEGATIVE / Ketone NEGATIVE  / Bili NEGATIVE / Urobili NORMAL       Blood NEGATIVE / Protein 10 / Leuk Est NEGATIVE / Nitrite NEGATIVE      RBC 0-2 / WBC 0-2 / Hyaline  / Gran  / Sq Epi OCC / Non Sq Epi  / Bacteria       HbA1c 5.9      [02-13-18 @ 07:11] [facility] DIVISION OF KIDNEY DISEASES AND HYPERTENSION -- INITIAL CONSULT NOTE  --------------------------------------------------------------------------------  HPI:    64 y/o male with hx of CKD (unknown stage), HTN, (DM (HbA1C 5.9), R. BKA from ulcer in 2009, who p/w worsening left foot pain of 3 day duration. Pt. had noticed a blister on the bottom of his left foot, which popped caused him extreme pain prompting him to come to the ED. On admission pt. noted to have JANY (on CKD) with creatinine of 3.81. Pt. endorses being told by his PMD he had renal disease but unsure what stage. He is urinates several times daily, denies dysuria, recent abx use, feeling of inability to fully empty bladder, nocturia, hematuria, foamy urine, fevers, cp, SOB, orthopnea, chills, n/v/d.     In the ED, pt. received 2L NS bolus, vancomycin x1 and zosyn x1, currently continued on clindamycin and zosyn.         PAST HISTORY  --------------------------------------------------------------------------------  PAST MEDICAL & SURGICAL HISTORY:  Kidney disease  HTN (hypertension)  DM (diabetes mellitus)  S/P BKA (below knee amputation) unilateral, right    FAMILY HISTORY:  Family history of diabetes mellitus (Mother)    PAST SOCIAL HISTORY:    ALLERGIES & MEDICATIONS  --------------------------------------------------------------------------------  Allergies    No Known Allergies    Intolerances    Standing Inpatient Medications  atorvastatin 40 milliGRAM(s) Oral at bedtime  clindamycin IVPB 900 milliGRAM(s) IV Intermittent every 8 hours  clindamycin IVPB      cyanocobalamin 1000 MICROGram(s) Oral daily  dextrose 5%. 1000 milliLiter(s) IV Continuous <Continuous>  dextrose 50% Injectable 12.5 Gram(s) IV Push once  dextrose 50% Injectable 25 Gram(s) IV Push once  dextrose 50% Injectable 25 Gram(s) IV Push once  heparin  Injectable 5000 Unit(s) SubCutaneous every 8 hours  influenza   Vaccine 0.5 milliLiter(s) IntraMuscular once  insulin lispro (HumaLOG) corrective regimen sliding scale   SubCutaneous three times a day before meals  labetalol 100 milliGRAM(s) Oral two times a day  NIFEdipine XL 30 milliGRAM(s) Oral daily  piperacillin/tazobactam IVPB. 3.375 Gram(s) IV Intermittent every 12 hours  silver sulfADIAZINE 1% Cream 1 Application(s) Topical daily  sodium chloride 0.9%. 1000 milliLiter(s) IV Continuous <Continuous>    PRN Inpatient Medications  acetaminophen   Tablet 650 milliGRAM(s) Oral every 6 hours PRN  acetaminophen   Tablet. 650 milliGRAM(s) Oral every 6 hours PRN  dextrose Gel 1 Dose(s) Oral once PRN  glucagon  Injectable 1 milliGRAM(s) IntraMuscular once PRN      REVIEW OF SYSTEMS  --------------------------------------------------------------------------------    All other systems were reviewed and are negative, except as noted.    VITALS/PHYSICAL EXAM  --------------------------------------------------------------------------------  T(C): 36.8 (02-13-18 @ 05:18), Max: 37.7 (02-12-18 @ 21:27)  HR: 74 (02-13-18 @ 05:18) (72 - 99)  BP: 147/64 (02-13-18 @ 05:18) (143/63 - 201/85)  RR: 18 (02-13-18 @ 05:18) (16 - 18)  SpO2: 100% (02-13-18 @ 05:18) (93% - 100%)  Wt(kg): --    Physical Exam:  	Gen: NAD, well-appearing  	HEENT: MMM, PERRL, supple neck, clear oropharynx  	Pulm: CTA B/L  	CV: RRR, S1S2; no rub  	Back: No spinal or CVA tenderness; no sacral edema  	Abd: +BS, soft, nontender/nondistended  	: No suprapubic tenderness  	UE: Warm, FROM, no clubbing, intact strength; no edema; no asterixis  	LE: GARLAND FORD, LLE dressed to ankle, 1+ pitting edema, no clubbing  	Neuro: No focal deficits, intact gait  	Psych: Normal affect and mood  	Skin: Warm, without rashes  	Vascular access:    LABS/STUDIES  --------------------------------------------------------------------------------              7.5    10.54 >-----------<  155      [02-13-18 @ 07:11]              24.4     141  |  109  |  58  ----------------------------<  87      [02-13-18 @ 07:11]  4.7   |  18  |  3.71        Ca     7.6     [02-13-18 @ 07:11]      Mg     1.9     [02-13-18 @ 07:11]      Phos  5.0     [02-13-18 @ 07:11]    TPro  6.1  /  Alb  3.0  /  TBili  0.2  /  DBili  x   /  AST  15  /  ALT  17  /  AlkPhos  82  [02-13-18 @ 07:11]    Creatinine Trend:  SCr 3.71 [02-13 @ 07:11]  SCr 3.81 [02-12 @ 15:42]    Urinalysis - [04-14-17 @ 22:50]      Color COLORL / Appearance CLEAR / SG 1.007 / pH 6.0      Gluc NEGATIVE / Ketone NEGATIVE  / Bili NEGATIVE / Urobili NORMAL       Blood NEGATIVE / Protein 10 / Leuk Est NEGATIVE / Nitrite NEGATIVE      RBC 0-2 / WBC 0-2 / Hyaline  / Gran  / Sq Epi OCC / Non Sq Epi  / Bacteria       HbA1c 5.9      [02-13-18 @ 07:11] [facility] DIVISION OF KIDNEY DISEASES AND HYPERTENSION -- INITIAL CONSULT NOTE  --------------------------------------------------------------------------------  HPI:    62 y/o male with hx of CKD (unknown stage), HTN, (DM (HbA1C 5.9), R. BKA from ulcer in 2009, who p/w worsening left foot pain of 3 day duration. Pt. had noticed a blister on the bottom of his left foot, which popped caused him extreme pain prompting him to come to the ED. On admission pt. noted to have JANY (on CKD) with creatinine of 3.81. Pt. endorses being told by his PMD he had renal disease but unsure what stage. He is urinates several times daily, denies dysuria, recent abx use, feeling of inability to fully empty bladder, nocturia, hematuria, foamy urine, fevers, cp, SOB, orthopnea, chills, n/v/d.     In the ED, pt. received 2L NS bolus, vancomycin x1 and zosyn x1, currently continued on clindamycin and zosyn.         PAST HISTORY  --------------------------------------------------------------------------------  PAST MEDICAL & SURGICAL HISTORY:  Kidney disease  HTN (hypertension)  DM (diabetes mellitus)  S/P BKA (below knee amputation) unilateral, right    FAMILY HISTORY:  Family history of diabetes mellitus (Mother)    PAST SOCIAL HISTORY:    ALLERGIES & MEDICATIONS  --------------------------------------------------------------------------------  Allergies    No Known Allergies    Intolerances    Standing Inpatient Medications  atorvastatin 40 milliGRAM(s) Oral at bedtime  clindamycin IVPB 900 milliGRAM(s) IV Intermittent every 8 hours  clindamycin IVPB      cyanocobalamin 1000 MICROGram(s) Oral daily  dextrose 5%. 1000 milliLiter(s) IV Continuous <Continuous>  dextrose 50% Injectable 12.5 Gram(s) IV Push once  dextrose 50% Injectable 25 Gram(s) IV Push once  dextrose 50% Injectable 25 Gram(s) IV Push once  heparin  Injectable 5000 Unit(s) SubCutaneous every 8 hours  influenza   Vaccine 0.5 milliLiter(s) IntraMuscular once  insulin lispro (HumaLOG) corrective regimen sliding scale   SubCutaneous three times a day before meals  labetalol 100 milliGRAM(s) Oral two times a day  NIFEdipine XL 30 milliGRAM(s) Oral daily  piperacillin/tazobactam IVPB. 3.375 Gram(s) IV Intermittent every 12 hours  silver sulfADIAZINE 1% Cream 1 Application(s) Topical daily  sodium chloride 0.9%. 1000 milliLiter(s) IV Continuous <Continuous>    PRN Inpatient Medications  acetaminophen   Tablet 650 milliGRAM(s) Oral every 6 hours PRN  acetaminophen   Tablet. 650 milliGRAM(s) Oral every 6 hours PRN  dextrose Gel 1 Dose(s) Oral once PRN  glucagon  Injectable 1 milliGRAM(s) IntraMuscular once PRN      REVIEW OF SYSTEMS  --------------------------------------------------------------------------------    All other systems were reviewed and are negative, except as noted.    VITALS/PHYSICAL EXAM  --------------------------------------------------------------------------------  T(C): 36.8 (02-13-18 @ 05:18), Max: 37.7 (02-12-18 @ 21:27)  HR: 74 (02-13-18 @ 05:18) (72 - 99)  BP: 147/64 (02-13-18 @ 05:18) (143/63 - 201/85)  RR: 18 (02-13-18 @ 05:18) (16 - 18)  SpO2: 100% (02-13-18 @ 05:18) (93% - 100%)  Wt(kg): --    Physical Exam:  	Gen: NAD,   	HEENT: MMM, supple neck  	Pulm: CTA B/L  	CV: RRR, S1S2; no rub  	Abd: +BS, soft, nontender/nondistended  	LE: R . BKA, LLE dressed to ankle, 1+ pitting edema, no clubbing  	Neuro: No focal deficits, intact gait  	Psych: Normal affect and mood  	Skin: Warm, without rashes    LABS/STUDIES  --------------------------------------------------------------------------------              7.5    10.54 >-----------<  155      [02-13-18 @ 07:11]              24.4     141  |  109  |  58  ----------------------------<  87      [02-13-18 @ 07:11]  4.7   |  18  |  3.71        Ca     7.6     [02-13-18 @ 07:11]      Mg     1.9     [02-13-18 @ 07:11]      Phos  5.0     [02-13-18 @ 07:11]    TPro  6.1  /  Alb  3.0  /  TBili  0.2  /  DBili  x   /  AST  15  /  ALT  17  /  AlkPhos  82  [02-13-18 @ 07:11]    Creatinine Trend:  SCr 3.71 [02-13 @ 07:11]  SCr 3.81 [02-12 @ 15:42]    Urinalysis - [04-14-17 @ 22:50]      Color COLORL / Appearance CLEAR / SG 1.007 / pH 6.0      Gluc NEGATIVE / Ketone NEGATIVE  / Bili NEGATIVE / Urobili NORMAL       Blood NEGATIVE / Protein 10 / Leuk Est NEGATIVE / Nitrite NEGATIVE      RBC 0-2 / WBC 0-2 / Hyaline  / Gran  / Sq Epi OCC / Non Sq Epi  / Bacteria       HbA1c 5.9      [02-13-18 @ 07:11]

## 2018-02-13 NOTE — PROGRESS NOTE ADULT - SUBJECTIVE AND OBJECTIVE BOX
Podiatry pager #: [phone]    Patient is a 63y old  Male who presents with a chief complaint of 63M PMH DM, HTN, CKD p/w L foot pain x 3 days. (12 Feb 2018 19:33)     INTERVAL HPI/OVERNIGHT EVENTS:  Patient seen and evaluated at bedside.  Pt is resting comfortable in NAD. Denies N/V/F/C.  Pt denies pain    Allergies    No Known Allergies    Intolerances    Vital Signs Last 24 Hrs  T(C): 36.8 (13 Feb 2018 05:18), Max: 37.7 (12 Feb 2018 21:27)  T(F): 98.3 (13 Feb 2018 05:18), Max: 99.9 (12 Feb 2018 22:35)  HR: 74 (13 Feb 2018 05:18) (72 - 99)  BP: 147/64 (13 Feb 2018 05:18) (143/63 - 201/85)  BP(mean): --  RR: 18 (13 Feb 2018 05:18) (16 - 18)  SpO2: 100% (13 Feb 2018 05:18) (93% - 100%)    LABS:                     7.5    10.54 )-----------( 155      ( 13 Feb 2018 07:11 )             24.4     02-13    141  |  109<H>  |  58<H>  ----------------------------<  87  4.7   |  18<L>  |  3.71<H>    Ca    7.6<L>      13 Feb 2018 07:11  Phos  5.0     02-13  Mg     1.9     02-13    TPro  6.1  /  Alb  3.0<L>  /  TBili  0.2  /  DBili  x   /  AST  15  /  ALT  17  /  AlkPhos  82  02-13    CAPILLARY BLOOD GLUCOSE    POCT Blood Glucose.: 82 mg/dL (13 Feb 2018 08:13)  POCT Blood Glucose.: 172 mg/dL (12 Feb 2018 23:11)  POCT Blood Glucose.: 85 mg/dL (12 Feb 2018 13:54)    Lower Extremity Physical Exam:  Vasular: DP/PT 0/4, B/L, CFT <2 seconds B/L, Temperature gradient warm, B/L.   Neuro: Epicritic sensation absent to the level of toes, B/L.  Musculoskeletal/Ortho:  Skin:  Wound #1:   Location: Left foot deroofed blister with cherry red non-blanchable trophic changes to plantar aspect of toes 1-5, forefoot, midfoot, no ascending erythema or swelling, no malodor, no purulence, no deep probe, etiology unknown, ROM of toes intact, CFT to each toes normal, no sinus tract, no undermining.    RADIOLOGY & ADDITIONAL TESTS:  < from: Xray Foot AP + Lateral + Oblique, Left (02.12.18 @ 19:48) >  ******PRELIMINARY REPORT******    ******PRELIMINARY REPORT******            EXAM:  RAD FOOT MIN 3 VIEWS LT        PROCEDURE DATE:  Feb 12 2018     ******PRELIMINARY REPORT******    ******PRELIMINARY REPORT******          INTERPRETATION:  No subcutaneous air, however necrotizing fasciitis can   present in the absence of subcutaneous air. No cortical erosion or   periosteal reaction to suggest osteomyelitis. No acute fracture or   dislocation.    ******PRELIMINARY REPORT******    ******PRELIMINARY REPORT******          ASA CASSIDY M.D., RADIOLOGY RESIDENT    < end of copied text >    Culture - Abscess with Gram Stain (02.12.18 @ 18:45)    Specimen Source: OTHER    Gram Stain Result:   GPCPR^Gram Pos Cocci in Pairs  QUANTITY OF BACTERIA SEEN: FEW (2+)  WBC^White Blood Cells  QNTY CELLS IN GRAM STAIN: RARE (1+)

## 2018-02-13 NOTE — PROGRESS NOTE ADULT - ATTENDING COMMENTS
urine electrolytes won't be reliable since there is no history of oliguria and he has been on IVF. will look for casts to r/o ATN in the setting of AG metabolic acidosis and Lactic acidosis. otherwise renal sono would be helpful if able. monitor on IVF.

## 2018-02-13 NOTE — PROGRESS NOTE ADULT - ASSESSMENT
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

## 2018-02-13 NOTE — PROGRESS NOTE ADULT - SUBJECTIVE AND OBJECTIVE BOX
VASCULAR SURGERY    No acute events overnight.  Patient has improvement in the R boot blister while on antibiotics  C Team appreciates continued Podiatry care  Will continue to follow, pending KENN/PVRs    C Mejia MORAN PGYII [id number]

## 2018-02-13 NOTE — CONSULT NOTE ADULT - SUBJECTIVE AND OBJECTIVE BOX
Patient is a 63y old  Male who presents with a chief complaint of 63M PMH DM, HTN, CKD p/w L foot pain x 3 days. (12 Feb 2018 19:33)      HPI:  63M PMH DM on januvia, FS 80s at home, HTN, CKD, R BKA in 2009 presents with 3 day history of worsening foot pain. Patient reports that 3 days ago he started noticing a blister forming on the bottom of his left foot. Today the blister popped and patient had bloody fluid come out and felt extreme pain. Denies any fevers, chills, nausea, abdominal pain, vomiting. States his R foot had ulcer. Denies history of smoking.   Patient denies any obstructive urinary symptoms: urinates several times per day, no dysuria, hematuria, back pain. Patient follows with his PMD for his diabetic and renal needs. Denies shortness of breath and chest pain.     In ED, CRP found to be 110, ESR 96, WBC 10.94. Lactate 3.5. Creatinine 3.81. (12 Feb 2018 19:33)      PAST MEDICAL & SURGICAL HISTORY:  Kidney disease  HTN (hypertension)  DM (diabetes mellitus)  S/P BKA (below knee amputation) unilateral, right      Allergies  No Known Allergies        ANTIMICROBIALS:  clindamycin IVPB 900 every 8 hours  clindamycin IVPB    piperacillin/tazobactam IVPB. 3.375 every 12 hours      OTHER MEDS: MEDICATIONS  (STANDING):  acetaminophen   Tablet 650 every 6 hours PRN  acetaminophen   Tablet. 650 every 6 hours PRN  atorvastatin 40 at bedtime  dextrose 50% Injectable 12.5 once  dextrose 50% Injectable 25 once  dextrose 50% Injectable 25 once  dextrose Gel 1 once PRN  glucagon  Injectable 1 once PRN  heparin  Injectable 5000 every 8 hours  influenza   Vaccine 0.5 once  insulin lispro (HumaLOG) corrective regimen sliding scale  three times a day before meals  labetalol 100 two times a day  NIFEdipine XL 30 daily      SOCIAL HISTORY:       FAMILY HISTORY:  Family history of diabetes mellitus (Mother)      REVIEW OF SYSTEMS  [  ] ROS unobtainable because:    [x] All other systems negative except as noted below:	    Constitutional:  [ ] fever [ ] weight loss  Skin:  [ ] rash [ ] phlebitis	  Eyes: [ ] icterus [ ] inflammation	  ENMT: [ ] discharge [ ] thrush [ ] ulcers [ ] exudates  Respiratory: [ ] dyspnea [ ] hemoptysis [ ] cough [ ] sputum	  Cardiovascular:  [ ] chest pain [ ] palpitations [ ] edema	  Gastrointestinal:  [ ] nausea [ ] vomiting [ ] diarrhea [ ] constipation [ ] pain	  Genitourinary:  [ ] dysuria [ ] frequency [ ] hematuria [ ] discharge [ ] flank pain  Musculoskeletal:  [ ] myalgias [ ] arthralgias [ ] arthritis	  Neurological:  [ ] headache [ ] seizures	  Psychiatric:  [ ] anxiety [ ] depression	  Hematology/Lymphatics:  [ ] lymphadenopathy  Endocrine:  [ ] adrenal [ ] thyroid  Allergic/Immunologic:	 [ ] transplant [ ] seasonal    Vital Signs Last 24 Hrs  T(F): 98.5 (02-13-18 @ 14:30), Max: 99.9 (02-12-18 @ 22:35)    Vital Signs Last 24 Hrs  HR: 74 (02-13-18 @ 14:30) (72 - 99)  BP: 134/67 (02-13-18 @ 14:30) (134/67 - 201/85)  RR: 17 (02-13-18 @ 14:30)  SpO2: 98% (02-13-18 @ 14:30) (93% - 100%)  Wt(kg): --    PHYSICAL EXAM:  General: non-toxic  HEAD/EYES: anicteric, PERRL  ENT:  supple  Cardiovascular:   S1, S2  Respiratory:  clear bilaterally  GI:  soft, non-tender, normal bowel sounds  :  no CVA tenderness   Musculoskeletal:  no synovitis  Neurologic:  grossly non-focal  Skin: Location: Left foot deroofed blister with cherry red non-blanchable trophic changes to plantar aspect of toes 1-5, forefoot, midfoot, no ascending erythema or swelling, no malodor, no purulence, no deep probe, etiology unknown, ROM of toes intact, CFT to each toes normal, no sinus tract, no undermining.  Lymph: no lymphadenopathy  Psychiatric:  appropriate affect  Vascular:  no phlebitis, nonpalpable pedal pulses                                7.5    10.54 )-----------( 155      ( 13 Feb 2018 07:11 )             24.4       02-13    141  |  109<H>  |  58<H>  ----------------------------<  87  4.7   |  18<L>  |  3.71<H>    Ca    7.6<L>      13 Feb 2018 07:11  Phos  5.0     02-13  Mg     1.9     02-13    TPro  6.1  /  Alb  3.0<L>  /  TBili  0.2  /  DBili  x   /  AST  15  /  ALT  17  /  AlkPhos  82  02-13          MICROBIOLOGY:    Culture - Abscess with Gram Stain (collected 02-12-18 @ 18:45)  Source: OTHER  Culture - Abscess with Gram Stain (02.12.18 @ 18:45)    Specimen Source: OTHER    Gram Stain Result:   GPCPR^Gram Pos Cocci in Pairs  QUANTITY OF BACTERIA SEEN: FEW (2+)  WBC^White Blood Cells  QNTY CELLS IN GRAM STAIN: RARE (1+)              Vancomycin Level, Trough: 12.0 ug/mL (02-13-18 @ 07:11)  v            RADIOLOGY:  < from: Xray Foot AP + Lateral + Oblique, Left (02.12.18 @ 19:48) >    EXAM:  RAD FOOT MIN 3 VIEWS LT      EXAM:  RAD TIB-FIB LT      EXAM:  RAD ANKLE MIN 3 VIEWS LEFT        PROCEDURE DATE:  Feb 12 2018         INTERPRETATION:  CLINICAL INFORMATION: Left foot pain, evaluate for   osteomyelitis or subcutaneous emphysema.    EXAM: 3 views of the left foot, 3 views of the left ankle, and 2 views of   the left tibia-fibula with no prior comparisons.    FINDINGS:  No acute displaced fracture.    No subcutaneous air is seen, however necrotizing fasciitis can present in  the absence of subcutaneous air. No focal osteopenia, cortical erosion or   periosteal reaction.    No ankle or knee joint effusion.    IMPRESSION:  No acute displaced fracture. No radiographic evidence of osteomyelitis.              HARDIK RODGERS M.D., RADIOLOGY RESIDENT  This document has been electronically signed.  CHRISTOPH MERCEDES M.D.; ATTENDING RADIOLOGIST  This document has been electronically signed. Feb 13 2018 10:46AM                  < end of copied text > Patient is a 63y old  Male who presents with a chief complaint of 63M PMH DM, HTN, CKD p/w L foot pain x 3 days. (12 Feb 2018 19:33)      HPI:  63M PMH DM on januvia, FS 80s at home, HTN, CKD, R BKA in 2009 presents with 3 day history of worsening foot pain. Patient reports that 3 days ago he started noticing a blister forming on the bottom of his left foot. Today the blister popped and patient had bloody fluid come out and felt extreme pain. Denies any fevers, chills, nausea, abdominal pain, vomiting. States his R foot had ulcer. Denies history of smoking.   Patient denies any obstructive urinary symptoms: urinates several times per day, no dysuria, hematuria, back pain. Patient follows with his PMD for his diabetic and renal needs. Denies shortness of breath and chest pain.     In ED, CRP found to be 110, ESR 96, WBC 10.94. Lactate 3.5. Creatinine 3.81. (12 Feb 2018 19:33)    Pt at this time states that the blister began as a pimple and progressed, is unsure of how it started. Pt does not recall any trauma although has diminished sensation. Pt denies any N/V/F/SOB at this time, nor any diarrhea or change in bowel or urination.      PAST MEDICAL & SURGICAL HISTORY:  Kidney disease  HTN (hypertension)  DM (diabetes mellitus)  S/P BKA (below knee amputation) unilateral, right      Allergies  No Known Allergies        ANTIMICROBIALS:  clindamycin IVPB 900 every 8 hours  clindamycin IVPB    piperacillin/tazobactam IVPB. 3.375 every 12 hours      OTHER MEDS: MEDICATIONS  (STANDING):  acetaminophen   Tablet 650 every 6 hours PRN  acetaminophen   Tablet. 650 every 6 hours PRN  atorvastatin 40 at bedtime  dextrose 50% Injectable 12.5 once  dextrose 50% Injectable 25 once  dextrose 50% Injectable 25 once  dextrose Gel 1 once PRN  glucagon  Injectable 1 once PRN  heparin  Injectable 5000 every 8 hours  influenza   Vaccine 0.5 once  insulin lispro (HumaLOG) corrective regimen sliding scale  three times a day before meals  labetalol 100 two times a day  NIFEdipine XL 30 daily      SOCIAL HISTORY:       FAMILY HISTORY:  Family history of diabetes mellitus (Mother)      REVIEW OF SYSTEMS  [  ] ROS unobtainable because:    [x] All other systems negative except as noted below:	    Constitutional:  [ ] fever [ ] weight loss  Skin:  [ ] rash [ ] phlebitis [x] edema L foot	  Eyes: [ ] icterus [ ] inflammation	  ENMT: [ ] discharge [ ] thrush [ ] ulcers [ ] exudates  Respiratory: [ ] dyspnea [ ] hemoptysis [ ] cough [ ] sputum	  Cardiovascular:  [ ] chest pain [ ] palpitations [ ] edema	  Gastrointestinal:  [ ] nausea [ ] vomiting [ ] diarrhea [ ] constipation [ ] pain	  Genitourinary:  [ ] dysuria [ ] frequency [ ] hematuria [ ] discharge [ ] flank pain  Musculoskeletal:  [ ] myalgias [ ] arthralgias [ ] arthritis	  Neurological:  [ ] headache [ ] seizures	[x] diminished peripheral sensation  Psychiatric:  [ ] anxiety [ ] depression	  Hematology/Lymphatics:  [ ] lymphadenopathy  Endocrine:  [ ] adrenal [ ] thyroid  Allergic/Immunologic:	 [ ] transplant [ ] seasonal    Vital Signs Last 24 Hrs  T(F): 98.5 (02-13-18 @ 14:30), Max: 99.9 (02-12-18 @ 22:35)    Vital Signs Last 24 Hrs  HR: 74 (02-13-18 @ 14:30) (72 - 99)  BP: 134/67 (02-13-18 @ 14:30) (134/67 - 201/85)  RR: 17 (02-13-18 @ 14:30)  SpO2: 98% (02-13-18 @ 14:30) (93% - 100%)  Wt(kg): --    PHYSICAL EXAM:  General: non-toxic, AAOx3  HEAD/EYES: anicteric, PERRL  ENT:  supple  Cardiovascular:   S1, S2  Respiratory:  clear bilaterally  GI:  soft, non-tender, normal bowel sounds  :  no CVA tenderness   Musculoskeletal:  no synovitis  Neurologic:  grossly non-focal  Skin: Location: Left foot deroofed blister with cherry red non-blanchable trophic changes to plantar aspect of toes 1-5, forefoot, midfoot, no ascending erythema or swelling, no malodor, no purulence, no deep probe, etiology unknown, ROM of toes intact, CFT to each toes normal, no sinus tract, no undermining.  Lymph: no lymphadenopathy  Psychiatric:  appropriate affect  Vascular:  no phlebitis, nonpalpable pedal pulses                                7.5    10.54 )-----------( 155      ( 13 Feb 2018 07:11 )             24.4       02-13    141  |  109<H>  |  58<H>  ----------------------------<  87  4.7   |  18<L>  |  3.71<H>    Ca    7.6<L>      13 Feb 2018 07:11  Phos  5.0     02-13  Mg     1.9     02-13    TPro  6.1  /  Alb  3.0<L>  /  TBili  0.2  /  DBili  x   /  AST  15  /  ALT  17  /  AlkPhos  82  02-13          MICROBIOLOGY:    Culture - Abscess with Gram Stain (collected 02-12-18 @ 18:45)  Source: OTHER  Culture - Abscess with Gram Stain (02.12.18 @ 18:45)    Specimen Source: OTHER    Gram Stain Result:   GPCPR^Gram Pos Cocci in Pairs  QUANTITY OF BACTERIA SEEN: FEW (2+)  WBC^White Blood Cells  QNTY CELLS IN GRAM STAIN: RARE (1+)              Vancomycin Level, Trough: 12.0 ug/mL (02-13-18 @ 07:11)  v            RADIOLOGY:  < from: Xray Foot AP + Lateral + Oblique, Left (02.12.18 @ 19:48) >    EXAM:  RAD FOOT MIN 3 VIEWS LT      EXAM:  RAD TIB-FIB LT      EXAM:  RAD ANKLE MIN 3 VIEWS LEFT        PROCEDURE DATE:  Feb 12 2018         INTERPRETATION:  CLINICAL INFORMATION: Left foot pain, evaluate for   osteomyelitis or subcutaneous emphysema.    EXAM: 3 views of the left foot, 3 views of the left ankle, and 2 views of   the left tibia-fibula with no prior comparisons.    FINDINGS:  No acute displaced fracture.    No subcutaneous air is seen, however necrotizing fasciitis can present in  the absence of subcutaneous air. No focal osteopenia, cortical erosion or   periosteal reaction.    No ankle or knee joint effusion.    IMPRESSION:  No acute displaced fracture. No radiographic evidence of osteomyelitis.              HARDIK RODGERS M.D., RADIOLOGY RESIDENT  This document has been electronically signed.  CHRISTOPH MERCEDES M.D.; ATTENDING RADIOLOGIST  This document has been electronically signed. Feb 13 2018 10:46AM                  < end of copied text > Patient is a 63y old  Male who presents with a chief complaint of 63M PMH DM, HTN, CKD p/w L foot pain x 3 days. (12 Feb 2018 19:33)      HPI:  63M PMH DM on januvia, FS 80s at home, HTN, CKD, R BKA in 2009 presents with 3 day history of worsening foot pain. Patient reports that 3 days ago he started noticing a blister forming on the bottom of his left foot. Today the blister popped and patient had bloody fluid come out and felt extreme pain. Denies any fevers, chills, nausea, abdominal pain, vomiting. States his R foot had ulcer. Denies history of smoking.   Patient denies any obstructive urinary symptoms: urinates several times per day, no dysuria, hematuria, back pain. Patient follows with his PMD for his diabetic and renal needs. Denies shortness of breath and chest pain.     In ED, CRP found to be 110, ESR 96, WBC 10.94. Lactate 3.5. Creatinine 3.81. (12 Feb 2018 19:33)    Pt at this time states that the blister began as a pimple and progressed, is unsure of how it started. Pt does not recall any trauma although has diminished sensation. Pt denies any N/V/F/SOB at this time, nor any diarrhea or change in bowel or urination. Pt states that he walks barefoot or in sandals but cannot specifically recall something new that caused the onset of this issue.      PAST MEDICAL & SURGICAL HISTORY:  Kidney disease  HTN (hypertension)  DM (diabetes mellitus)  S/P BKA (below knee amputation) unilateral, right      Allergies  No Known Allergies        ANTIMICROBIALS:  clindamycin IVPB 900 every 8 hours  clindamycin IVPB    piperacillin/tazobactam IVPB. 3.375 every 12 hours      OTHER MEDS: MEDICATIONS  (STANDING):  acetaminophen   Tablet 650 every 6 hours PRN  acetaminophen   Tablet. 650 every 6 hours PRN  atorvastatin 40 at bedtime  dextrose 50% Injectable 12.5 once  dextrose 50% Injectable 25 once  dextrose 50% Injectable 25 once  dextrose Gel 1 once PRN  glucagon  Injectable 1 once PRN  heparin  Injectable 5000 every 8 hours  influenza   Vaccine 0.5 once  insulin lispro (HumaLOG) corrective regimen sliding scale  three times a day before meals  labetalol 100 two times a day  NIFEdipine XL 30 daily      SOCIAL HISTORY:       FAMILY HISTORY:  Family history of diabetes mellitus (Mother)      REVIEW OF SYSTEMS  [  ] ROS unobtainable because:    [x] All other systems negative except as noted below:	    Constitutional:  [ ] fever [ ] weight loss  Skin:  [ ] rash [ ] phlebitis [x] edema L foot	  Eyes: [ ] icterus [ ] inflammation	  ENMT: [ ] discharge [ ] thrush [ ] ulcers [ ] exudates  Respiratory: [ ] dyspnea [ ] hemoptysis [ ] cough [ ] sputum	  Cardiovascular:  [ ] chest pain [ ] palpitations [ ] edema	  Gastrointestinal:  [ ] nausea [ ] vomiting [ ] diarrhea [ ] constipation [ ] pain	  Genitourinary:  [ ] dysuria [ ] frequency [ ] hematuria [ ] discharge [ ] flank pain  Musculoskeletal:  [ ] myalgias [ ] arthralgias [ ] arthritis	  Neurological:  [ ] headache [ ] seizures	[x] diminished peripheral sensation  Psychiatric:  [ ] anxiety [ ] depression	  Hematology/Lymphatics:  [ ] lymphadenopathy  Endocrine:  [ ] adrenal [ ] thyroid  Allergic/Immunologic:	 [ ] transplant [ ] seasonal    Vital Signs Last 24 Hrs  T(F): 98.5 (02-13-18 @ 14:30), Max: 99.9 (02-12-18 @ 22:35)    Vital Signs Last 24 Hrs  HR: 74 (02-13-18 @ 14:30) (72 - 99)  BP: 134/67 (02-13-18 @ 14:30) (134/67 - 201/85)  RR: 17 (02-13-18 @ 14:30)  SpO2: 98% (02-13-18 @ 14:30) (93% - 100%)  Wt(kg): --    PHYSICAL EXAM:  General: non-toxic, AAOx3  HEAD/EYES: anicteric, PERRL  ENT:  supple  Cardiovascular:   S1, S2  Respiratory:  clear bilaterally  GI:  soft, non-tender, normal bowel sounds  :  no CVA tenderness   Musculoskeletal:  no synovitis  Neurologic:  grossly non-focal  Skin: Location: Left foot deroofed blister with cherry red non-blanchable trophic changes to plantar aspect of toes 1-5, forefoot, midfoot, no ascending erythema or swelling, no malodor, no purulence, no deep probe, etiology unknown, ROM of toes intact, CFT to each toes normal, no sinus tract, no undermining.  Lymph: no lymphadenopathy  Psychiatric:  appropriate affect  Vascular:  no phlebitis, nonpalpable pedal pulses                                7.5    10.54 )-----------( 155      ( 13 Feb 2018 07:11 )             24.4       02-13    141  |  109<H>  |  58<H>  ----------------------------<  87  4.7   |  18<L>  |  3.71<H>    Ca    7.6<L>      13 Feb 2018 07:11  Phos  5.0     02-13  Mg     1.9     02-13    TPro  6.1  /  Alb  3.0<L>  /  TBili  0.2  /  DBili  x   /  AST  15  /  ALT  17  /  AlkPhos  82  02-13          MICROBIOLOGY:    Culture - Abscess with Gram Stain (collected 02-12-18 @ 18:45)  Source: OTHER  Culture - Abscess with Gram Stain (02.12.18 @ 18:45)    Specimen Source: OTHER    Gram Stain Result:   GPCPR^Gram Pos Cocci in Pairs  QUANTITY OF BACTERIA SEEN: FEW (2+)  WBC^White Blood Cells  QNTY CELLS IN GRAM STAIN: RARE (1+)              Vancomycin Level, Trough: 12.0 ug/mL (02-13-18 @ 07:11)  v            RADIOLOGY:  < from: Xray Foot AP + Lateral + Oblique, Left (02.12.18 @ 19:48) >    EXAM:  RAD FOOT MIN 3 VIEWS LT      EXAM:  RAD TIB-FIB LT      EXAM:  RAD ANKLE MIN 3 VIEWS LEFT        PROCEDURE DATE:  Feb 12 2018         INTERPRETATION:  CLINICAL INFORMATION: Left foot pain, evaluate for   osteomyelitis or subcutaneous emphysema.    EXAM: 3 views of the left foot, 3 views of the left ankle, and 2 views of   the left tibia-fibula with no prior comparisons.    FINDINGS:  No acute displaced fracture.    No subcutaneous air is seen, however necrotizing fasciitis can present in  the absence of subcutaneous air. No focal osteopenia, cortical erosion or   periosteal reaction.    No ankle or knee joint effusion.    IMPRESSION:  No acute displaced fracture. No radiographic evidence of osteomyelitis.              HARDIK RODGERS M.D., RADIOLOGY RESIDENT  This document has been electronically signed.  CHRISTOPH MERCEDES M.D.; ATTENDING RADIOLOGIST  This document has been electronically signed. Feb 13 2018 10:46AM                  < end of copied text > Patient is a 63y old  Male who presents with a chief complaint of 63M PMH DM, HTN, CKD p/w L foot pain x 3 days. (12 Feb 2018 19:33)    HPI:  63M PMH DM on januvia, FS 80s at home, HTN, CKD, R BKA in 2009 presents with 3 day history of worsening foot pain. Patient reports that 3 days ago he started noticing a blister forming on the bottom of his left foot. Today the blister popped and patient had bloody fluid come out and felt extreme pain. Denies any fevers, chills, nausea, abdominal pain, vomiting. States his R foot had ulcer. Denies history of smoking.   Patient denies any obstructive urinary symptoms: urinates several times per day, no dysuria, hematuria, back pain. Patient follows with his PMD for his diabetic and renal needs. Denies shortness of breath and chest pain.     In ED, CRP found to be 110, ESR 96, WBC 10.94. Lactate 3.5. Creatinine 3.81. (12 Feb 2018 19:33)    Pt at this time states that the blister began as a pimple and progressed, is unsure of how it started. Pt does not recall any trauma although has diminished sensation. Pt denies any N/V/F/SOB at this time, nor any diarrhea or change in bowel or urination. Pt states that he walks barefoot or in sandals but cannot specifically recall something new that caused the onset of this issue.    PAST MEDICAL & SURGICAL HISTORY:  Kidney disease  HTN (hypertension)  DM (diabetes mellitus)  S/P BKA (below knee amputation) unilateral, right    Allergies  No Known Allergies    ANTIMICROBIALS:    clindamycin IVPB 900 every 8 hours  piperacillin/tazobactam IVPB. 3.375 every 12 hours    OTHER MEDS: MEDICATIONS  (STANDING):  atorvastatin 40 at bedtime  heparin  Injectable 5000 every 8 hours  influenza   Vaccine 0.5 once  insulin lispro (HumaLOG) corrective regimen sliding scale  three times a day before meals  labetalol 100 two times a day  NIFEdipine XL 30 daily    SOCIAL HISTORY:   non-smoker    FAMILY HISTORY:  Family history of diabetes mellitus (Mother)    REVIEW OF SYSTEMS  [  ] ROS unobtainable because:    [x] All other systems negative except as noted below:	    Constitutional:  [ ] fever [ ] weight loss  Skin:  [ ] rash [ ] phlebitis [x] edema L foot	  Eyes: [ ] icterus [ ] inflammation	  ENMT: [ ] discharge [ ] thrush [ ] ulcers [ ] exudates  Respiratory: [ ] dyspnea [ ] hemoptysis [ ] cough [ ] sputum	  Cardiovascular:  [ ] chest pain [ ] palpitations [ ] edema	  Gastrointestinal:  [ ] nausea [ ] vomiting [ ] diarrhea [ ] constipation [ ] pain	  Genitourinary:  [ ] dysuria [ ] frequency [ ] hematuria [ ] discharge [ ] flank pain  Musculoskeletal:  [ ] myalgias [ ] arthralgias [ ] arthritis	  Neurological:  [ ] headache [ ] seizures	[x] diminished peripheral sensation  Psychiatric:  [ ] anxiety [ ] depression	  Hematology/Lymphatics:  [ ] lymphadenopathy  Endocrine:  [ ] adrenal [ ] thyroid  Allergic/Immunologic:	 [ ] transplant [ ] seasonal    Vital Signs Last 24 Hrs  T(F): 98.5 (02-13-18 @ 14:30), Max: 99.9 (02-12-18 @ 22:35)    Vital Signs Last 24 Hrs  HR: 74 (02-13-18 @ 14:30) (72 - 99)  BP: 134/67 (02-13-18 @ 14:30) (134/67 - 201/85)  RR: 17 (02-13-18 @ 14:30)  SpO2: 98% (02-13-18 @ 14:30) (93% - 100%)  Wt(kg): --    PHYSICAL EXAM:  General: non-toxic  HEAD/EYES: anicteric  ENT:  supple; no thrush  Cardiovascular:   S1, S2, no murmur  Respiratory:  clear bilaterally  GI:  soft, non-tender, normal bowel sounds  :  no CVA tenderness   Musculoskeletal:  no synovitis  Neurologic:  grossly non-focal  Skin: Location: Left foot deroofed blister with cherry red non-blanchable trophic changes to plantar aspect of toes 1-5, forefoot, midfoot, no ascending erythema or swelling, no malodor, no purulence, no deep probe, etiology unknown, ROM of toes intact, CFT to each toes normal, no sinus tract, no undermining.  Lymph: no lymphadenopathy  Psychiatric:  appropriate affect  Vascular:  no phlebitis, nonpalpable pedal pulses                       7.5    10.54 )-----------( 155      ( 13 Feb 2018 07:11 )             24.4     141  |  109<H>  |  58<H>  ----------------------------<  87  4.7   |  18<L>  |  3.71<H>    Ca    7.6<L>      13 Feb 2018 07:11  Phos  5.0     02-13  Mg     1.9     02-13    TPro  6.1  /  Alb  3.0<L>  /  TBili  0.2  /  DBili  x   /  AST  15  /  ALT  17  /  AlkPhos  82  02-13    MICROBIOLOGY:  BC pending    Culture - Abscess with Gram Stain (collected 02-12-18 @ 18:45)  Source: OTHER  Culture - Abscess with Gram Stain (02.12.18 @ 18:45)    Specimen Source: OTHER    Gram Stain Result:   GPCPR^Gram Pos Cocci in Pairs  QUANTITY OF BACTERIA SEEN: FEW (2+)  WBC^White Blood Cells  QNTY CELLS IN GRAM STAIN: RARE (1+)    Vancomycin Level, Trough: 12.0 ug/mL (02-13-18 @ 07:11)    RADIOLOGY:  Xray Foot AP + Lateral + Oblique, Left (02.12.18 @ 19:48)  IMPRESSION:  No acute displaced fracture. No radiographic evidence of osteomyelitis.

## 2018-02-13 NOTE — PROGRESS NOTE ADULT - ASSESSMENT
63M PMH DM on januvia, FS 80s at home, HTN, CKD, R BKA in 2009 presents with 3 day history of worsening foot pain concerning for cellulitis vs osteo, currently stable.

## 2018-02-13 NOTE — PROGRESS NOTE ADULT - PROBLEM SELECTOR PLAN 3
-JANY on CKD stage III  -Likely pre-renal vs ATN  -IVF. Re-assess BMP  -Renal consult appreciated. awaiting recommendations.

## 2018-02-13 NOTE — PROGRESS NOTE ADULT - PROBLEM SELECTOR PLAN 1
JANY on CKD (unclear stage) - 2017 Cr. ~1.5, currently 3.71  -pt. with anemia, hypocalcemia, hyperphosphatemia indicating chronicity of renal disease  -DDx:   -recommendations: urine sodium, urine creatinine, I/Os, avoid nephrotoxins, renally dose medications JANY on CKD (unclear stage) - 2017 Cr. ~1.5, currently 3.71  -pt. with anemia, hypocalcemia, hyperphosphatemia indicating chronicity of renal disease  -likely pre-renal, pt. responding with improvement in creatinine in IVF  -recommendations: spot protein/cr ratio, urinalysis, renal US, urine sodium, urine creatinine, I/Os, c/w IVF, monitor daily BMPs, avoid RCA, NSAIDs, nephrotoxins, renally dose medications JANY on CKD (unclear stage) - 2017 Cr. ~1.5, currently 3.71  -pt. with anemia, hypocalcemia, hyperphosphatemia indicating chronicity of renal disease  -likely pre-renal, Cr. improving with IVF  -recommendations: spot protein/cr ratio, urinalysis, urine sodium, urine creatinine, renal US, I/Os, c/w IVF, monitor daily BMPs, avoid RCA, NSAIDs, nephrotoxins, renally dose medications JANY on CKD (unclear stage) - 2017 Cr. ~1.5, currently 3.71  -pt. with anemia, hypocalcemia, hyperphosphatemia indicating chronicity of renal disease  -pre-renal vs. ATN in the setting of sepsis, Cr. improving with IVF  -recommendations: spot protein/cr ratio, urinalysis, urine sodium, urine creatinine, renal US, I/Os, c/w IVF, monitor daily BMPs, avoid RCA, NSAIDs, nephrotoxins, renally dose medications JANY on CKD (unclear stage) - 2017 Cr. ~1.5, currently 3.71  -pt. with anemia, hypocalcemia, hyperphosphatemia indicating chronicity of renal disease  -likely ATN with superimposed pre-renal in the setting of sepsis, Cr. improving with IVF  -recommendations: spot protein/cr ratio, urinalysis, renal US, I/Os, c/w IVF, monitor daily BMPs, avoid RCA, NSAIDs, nephrotoxins, renally dose medications JANY on CKD (unclear stage) - 2017 Cr. ~1.5, currently 3.71  -pt. with anemia, hypocalcemia, hyperphosphatemia indicating chronicity of renal disease  -likely ATN with superimposed pre-renal in the setting of sepsis, Cr. improving with IVF  -recommendations:   -will discuss with PMD baseline cr, awaiting urine collection for microscopic analysis  -I/Os, c/w IVF, monitor daily BMPs, avoid RCA, NSAIDs, nephrotoxins, renally dose medications JANY on CKD (unclear stage) -April 2017 Cr. ~1.5, currently 3.71  -Creatinine July 2017 per PMD's office 2.30, pt. with anemia, hypocalcemia, hyperphosphatemia indicating chronicity of renal disease  -likely ATN with superimposed pre-renal in the setting of sepsis, Cr. improving with IVF  -recommendations:   -awaiting urine collection for microscopic analysis  -I/Os, c/w IVF, monitor daily BMPs, avoid RCA, NSAIDs, nephrotoxins, renally dose medications JANY on CKD (unclear stage) -April 2017 Cr. ~1.5, currently 3.71  -Creatinine July 2017 per PMD's office 2.30, pt. with anemia, hypocalcemia, hyperphosphatemia indicating chronicity of renal disease  -likely ATN with superimposed pre-renal in the setting of sepsis, Cr. improving with IVF  -recommendations:   -awaiting urine collection for microscopic analysis  -please collect spot urine protein/cr and urinalysis   -I/Os, c/w IVF, monitor daily BMPs, avoid RCA, NSAIDs, nephrotoxins, renally dose medications

## 2018-02-13 NOTE — PROGRESS NOTE ADULT - SUBJECTIVE AND OBJECTIVE BOX
Progress Note    JAMES PATRICK 63y (1955) Male [id number]  02-12-18 (1d)    Dr. Galvez [facility] PGY1  Pager# [phone]    Subjective:  No acute events overnight. Patient seen and examined at bedside. reports pain, but again denies systemic sx of fevers, chills, nausea, vomiting, abdominal pain.    CONSTITUTIONAL: No fever, weight loss, or fatigue  EYES: No eye pain, visual disturbances, or discharge  ENMT:  No difficulty hearing, tinnitus, vertigo; No sinus or throat pain  NECK: No pain or stiffness  RESPIRATORY: No cough, wheezing, chills or hemoptysis; No shortness of breath  CARDIOVASCULAR: No chest pain, palpitations, dizziness, or leg swelling  GASTROINTESTINAL: No abdominal or epigastric pain. No nausea, vomiting, or hematemesis; No diarrhea or constipation. No melena or hematochezia.  GENITOURINARY: No dysuria, frequency, hematuria, or incontinence  NEUROLOGICAL: No headaches, memory loss, loss of strength, numbness, or tremors  SKIN: +debrided skin on L sole  MUSCULOSKELETAL: +foot pain.   PSYCHIATRIC: No depression, anxiety, mood swings, or difficulty sleeping    PAST MEDICAL & SURGICAL HISTORY:  Kidney disease (N28.9)  HTN (hypertension) (I10)  DM (diabetes mellitus) (E11.9)  S/P BKA (below knee amputation) unilateral, right (Z89.511)  No significant past surgical history ([id number])    acetaminophen   Tablet 650 milliGRAM(s) Oral every 6 hours PRN  acetaminophen   Tablet. 650 milliGRAM(s) Oral every 6 hours PRN  atorvastatin 40 milliGRAM(s) Oral at bedtime  clindamycin IVPB 900 milliGRAM(s) IV Intermittent every 8 hours  clindamycin IVPB      cyanocobalamin 1000 MICROGram(s) Oral daily  dextrose 5%. 1000 milliLiter(s) IV Continuous <Continuous>  dextrose 50% Injectable 12.5 Gram(s) IV Push once  dextrose 50% Injectable 25 Gram(s) IV Push once  dextrose 50% Injectable 25 Gram(s) IV Push once  dextrose Gel 1 Dose(s) Oral once PRN  glucagon  Injectable 1 milliGRAM(s) IntraMuscular once PRN  heparin  Injectable 5000 Unit(s) SubCutaneous every 8 hours  influenza   Vaccine 0.5 milliLiter(s) IntraMuscular once  insulin lispro (HumaLOG) corrective regimen sliding scale   SubCutaneous three times a day before meals  labetalol 100 milliGRAM(s) Oral two times a day  NIFEdipine XL 30 milliGRAM(s) Oral daily  piperacillin/tazobactam IVPB. 3.375 Gram(s) IV Intermittent every 12 hours  silver sulfADIAZINE 1% Cream 1 Application(s) Topical daily  sodium chloride 0.9%. 1000 milliLiter(s) IV Continuous <Continuous>    Objective:  T(C): 36.9 (02-13-18 @ 14:30), Max: 37.7 (02-12-18 @ 21:27)  HR: 74 (02-13-18 @ 14:30) (72 - 99)  BP: 134/67 (02-13-18 @ 14:30) (134/67 - 201/85)  RR: 17 (02-13-18 @ 14:30) (16 - 18)  SpO2: 98% (02-13-18 @ 14:30) (93% - 100%)    GENERAL: NAD, well-developed  HEAD:  Atraumatic, Normocephalic  EYES: EOMI, PERRLA, conjunctiva and sclera clear  NECK: Supple, No JVD  CHEST/LUNG: Clear to auscultation bilaterally; No wheeze  HEART: Regular rate and rhythm; No murmurs, rubs, or gallops  ABDOMEN: Soft, Nontender, Nondistended; Bowel sounds present  EXTREMITIES:  R leg: +BKA at proximal shin. L foot: skin is dry, edematous, with debrided sole up to mid-foot. No surrounding erythema tracking. No purulent drainage. Some granulation tissue in place.   PSYCH: AAOx3  NEUROLOGY: non-focal  SKIN: See extremities exam.    CAPILLARY BLOOD GLUCOSE      (02-13 @ 07:11)                      7.5  10.54 )-----------( 155                 24.4    Neutrophils = 7.97 (75.6%)  Lymphocytes = 0.95 (9.0%)  Eosinophils = 0.19 (1.8%)  Basophils = 0.03 (0.3%)  Monocytes = 1.35 (12.8%)  Bands = --%    02-13    141  |  109<H>  |  58<H>  ----------------------------<  87  4.7   |  18<L>  |  3.71<H>    Ca    7.6<L>      13 Feb 2018 07:11  Phos  5.0     02-13  Mg     1.9     02-13    TPro  6.1  /  Alb  3.0<L>  /  TBili  0.2  /  DBili  x   /  AST  15  /  ALT  17  /  AlkPhos  82  02-13        Venous Blood Gas:  02-13 @ 02:50  7.29/44/76/20/93.4  VBG Lactate: 0.6  Venous Blood Gas:  02-12 @ 15:42  7.21/60/38/20/59.6  VBG Lactate: 3.5    WBC Trend: 10.54<--, 13.04<--    Hb Trend: 7.5<--, 9.6<--      Culture - Abscess with Gram Stain (collected 02-12-18 @ 18:45)- gram positive cocci in pairs  Source: OTHER        New imaging in last 24 hours: Xrays  Consult notes reviewed: Vascular, podiatry

## 2018-02-13 NOTE — PROGRESS NOTE ADULT - PROBLEM SELECTOR PLAN 1
-L foot with cellulitis vs osteomyelitis.   -Start on vancomycin by level (level 12 today, repleted), zosyn, and clindamycin.   -Podiatry has evaluated patient and are following report improvement, recs appreciated.   -Surgery has been consulted and are following.   -Wound cultures growing gram positive cocci in pairs. Awaiting speciation and sensitivities.  -ID consult placed.  -LLE xrays negative for free air; awaiting CT read. Will likely need MRI to r/o osteomyelitis.

## 2018-02-14 LAB
ALBUMIN SERPL ELPH-MCNC: 3.3 G/DL — SIGNIFICANT CHANGE UP (ref 3.3–5)
ALP SERPL-CCNC: 94 U/L — SIGNIFICANT CHANGE UP (ref 40–120)
ALT FLD-CCNC: 27 U/L — SIGNIFICANT CHANGE UP (ref 4–41)
AST SERPL-CCNC: 26 U/L — SIGNIFICANT CHANGE UP (ref 4–40)
BASOPHILS # BLD AUTO: 0.02 K/UL — SIGNIFICANT CHANGE UP (ref 0–0.2)
BASOPHILS NFR BLD AUTO: 0.2 % — SIGNIFICANT CHANGE UP (ref 0–2)
BILIRUB SERPL-MCNC: 0.3 MG/DL — SIGNIFICANT CHANGE UP (ref 0.2–1.2)
BUN SERPL-MCNC: 63 MG/DL — HIGH (ref 7–23)
CALCIUM SERPL-MCNC: 8.2 MG/DL — LOW (ref 8.4–10.5)
CHLORIDE SERPL-SCNC: 107 MMOL/L — SIGNIFICANT CHANGE UP (ref 98–107)
CO2 SERPL-SCNC: 17 MMOL/L — LOW (ref 22–31)
CREAT SERPL-MCNC: 3.89 MG/DL — HIGH (ref 0.5–1.3)
EOSINOPHIL # BLD AUTO: 0.28 K/UL — SIGNIFICANT CHANGE UP (ref 0–0.5)
EOSINOPHIL NFR BLD AUTO: 3 % — SIGNIFICANT CHANGE UP (ref 0–6)
GLUCOSE BLDC GLUCOMTR-MCNC: 113 MG/DL — HIGH (ref 70–99)
GLUCOSE BLDC GLUCOMTR-MCNC: 190 MG/DL — HIGH (ref 70–99)
GLUCOSE BLDC GLUCOMTR-MCNC: 83 MG/DL — SIGNIFICANT CHANGE UP (ref 70–99)
GLUCOSE BLDC GLUCOMTR-MCNC: 99 MG/DL — SIGNIFICANT CHANGE UP (ref 70–99)
GLUCOSE SERPL-MCNC: 88 MG/DL — SIGNIFICANT CHANGE UP (ref 70–99)
HCT VFR BLD CALC: 25.3 % — LOW (ref 39–50)
HGB BLD-MCNC: 8.1 G/DL — LOW (ref 13–17)
IMM GRANULOCYTES # BLD AUTO: 0.04 # — SIGNIFICANT CHANGE UP
IMM GRANULOCYTES NFR BLD AUTO: 0.4 % — SIGNIFICANT CHANGE UP (ref 0–1.5)
LYMPHOCYTES # BLD AUTO: 1.29 K/UL — SIGNIFICANT CHANGE UP (ref 1–3.3)
LYMPHOCYTES # BLD AUTO: 14 % — SIGNIFICANT CHANGE UP (ref 13–44)
MCHC RBC-ENTMCNC: 28.4 PG — SIGNIFICANT CHANGE UP (ref 27–34)
MCHC RBC-ENTMCNC: 32 % — SIGNIFICANT CHANGE UP (ref 32–36)
MCV RBC AUTO: 88.8 FL — SIGNIFICANT CHANGE UP (ref 80–100)
MONOCYTES # BLD AUTO: 1.05 K/UL — HIGH (ref 0–0.9)
MONOCYTES NFR BLD AUTO: 11.4 % — SIGNIFICANT CHANGE UP (ref 2–14)
NEUTROPHILS # BLD AUTO: 6.52 K/UL — SIGNIFICANT CHANGE UP (ref 1.8–7.4)
NEUTROPHILS NFR BLD AUTO: 71 % — SIGNIFICANT CHANGE UP (ref 43–77)
NRBC # FLD: 0 — SIGNIFICANT CHANGE UP
PLATELET # BLD AUTO: 162 K/UL — SIGNIFICANT CHANGE UP (ref 150–400)
PMV BLD: 11.6 FL — SIGNIFICANT CHANGE UP (ref 7–13)
POTASSIUM SERPL-MCNC: 4.7 MMOL/L — SIGNIFICANT CHANGE UP (ref 3.5–5.3)
POTASSIUM SERPL-SCNC: 4.7 MMOL/L — SIGNIFICANT CHANGE UP (ref 3.5–5.3)
PROT SERPL-MCNC: 6.8 G/DL — SIGNIFICANT CHANGE UP (ref 6–8.3)
RBC # BLD: 2.85 M/UL — LOW (ref 4.2–5.8)
RBC # FLD: 14.1 % — SIGNIFICANT CHANGE UP (ref 10.3–14.5)
SODIUM SERPL-SCNC: 143 MMOL/L — SIGNIFICANT CHANGE UP (ref 135–145)
VANCOMYCIN FLD-MCNC: 19.2 UG/ML — SIGNIFICANT CHANGE UP
WBC # BLD: 9.2 K/UL — SIGNIFICANT CHANGE UP (ref 3.8–10.5)
WBC # FLD AUTO: 9.2 K/UL — SIGNIFICANT CHANGE UP (ref 3.8–10.5)

## 2018-02-14 PROCEDURE — 99233 SBSQ HOSP IP/OBS HIGH 50: CPT | Mod: GC

## 2018-02-14 PROCEDURE — 76775 US EXAM ABDO BACK WALL LIM: CPT | Mod: 26

## 2018-02-14 PROCEDURE — 99232 SBSQ HOSP IP/OBS MODERATE 35: CPT

## 2018-02-14 RX ORDER — LACTOBACILLUS ACIDOPHILUS 100MM CELL
1 CAPSULE ORAL DAILY
Refills: 0 | Status: DISCONTINUED | OUTPATIENT
Start: 2018-02-14 | End: 2018-03-16

## 2018-02-14 RX ADMIN — Medication 1 APPLICATION(S): at 12:22

## 2018-02-14 RX ADMIN — HEPARIN SODIUM 5000 UNIT(S): 5000 INJECTION INTRAVENOUS; SUBCUTANEOUS at 05:48

## 2018-02-14 RX ADMIN — Medication 1 TABLET(S): at 13:39

## 2018-02-14 RX ADMIN — Medication 100 MILLIGRAM(S): at 18:28

## 2018-02-14 RX ADMIN — HEPARIN SODIUM 5000 UNIT(S): 5000 INJECTION INTRAVENOUS; SUBCUTANEOUS at 21:45

## 2018-02-14 RX ADMIN — PREGABALIN 1000 MICROGRAM(S): 225 CAPSULE ORAL at 13:39

## 2018-02-14 RX ADMIN — PIPERACILLIN AND TAZOBACTAM 25 GRAM(S): 4; .5 INJECTION, POWDER, LYOPHILIZED, FOR SOLUTION INTRAVENOUS at 18:28

## 2018-02-14 RX ADMIN — HEPARIN SODIUM 5000 UNIT(S): 5000 INJECTION INTRAVENOUS; SUBCUTANEOUS at 13:39

## 2018-02-14 RX ADMIN — ATORVASTATIN CALCIUM 40 MILLIGRAM(S): 80 TABLET, FILM COATED ORAL at 21:45

## 2018-02-14 RX ADMIN — Medication 30 MILLIGRAM(S): at 05:48

## 2018-02-14 RX ADMIN — Medication 100 MILLIGRAM(S): at 05:48

## 2018-02-14 RX ADMIN — PIPERACILLIN AND TAZOBACTAM 25 GRAM(S): 4; .5 INJECTION, POWDER, LYOPHILIZED, FOR SOLUTION INTRAVENOUS at 06:56

## 2018-02-14 NOTE — PROGRESS NOTE ADULT - PROBLEM SELECTOR PLAN 4
-Pt on oral medications at home.   -Start on sliding scale.  -Hemoglobin a1c: 5.9%  -Diabetic precautions

## 2018-02-14 NOTE — PROGRESS NOTE ADULT - ASSESSMENT
62yo M with DM and R BKA, now presenting with Diabetic foot soft tissue infection. No evidence of necrotizing fasciitis or systemic infection.     - Wound care as per podiatry  -pt  will require  lle angio however there is sig risk of renal failure   d/w pt indications risks and benefits of le angio pt to d/w renal and decide

## 2018-02-14 NOTE — PROGRESS NOTE ADULT - PROBLEM SELECTOR PLAN 2
-JANY on CKD stage III vs ATN  -Renal consult appreciated. Recommend renal ultrasound (ordered) and will look at urine under microscope (awaiting collection). Got in contact with pt's PMD, baseline creatinine is 2.3.   -Avoiding nephrotoxins and RCA. -JANY on CKD stage III vs ATN  -Renal consult appreciated. Recommend renal ultrasound -no findings. Spun down urine demonstrating granular casts suggesting ATN. Got in contact with pt's PMD, baseline creatinine is 2.3.   -Avoiding nephrotoxins and RCA.

## 2018-02-14 NOTE — PROGRESS NOTE ADULT - PROBLEM SELECTOR PLAN 1
-L foot with cellulitis.   -ID has seen patient, are not concerned for OM at this time. Rec to d/c Clindamycin (done)  -Start on vancomycin by level (level  19 today, therapeutic. Continuing zosyn.   -Podiatry has evaluated patient and report good improvement. recs appreciated.   -Surgery has been consulted and are following. Want an angiogram. Vascular attending will speak to renal attending today.  -Wound cultures growing gram positive cocci in pairs. Awaiting speciation and sensitivities. BLood cx with NGTD.  -LLE xrays negative for free air; CT read negative for free air. -L foot with cellulitis.   -ID has seen patient, are not concerned for OM at this time. Rec to d/c Clindamycin (done)  -Start on vancomycin by level (level  19 today, therapeutic. Continuing zosyn.   -Podiatry has evaluated patient and report good improvement. recs appreciated.   -Surgery has been consulted and are following. Want an angiogram. Vascular attending will speak to renal attending today, but per renal, we should wait until creatinine stabilizes.  -Wound cultures growing gram positive cocci in pairs. Awaiting speciation and sensitivities. BLood cx with NGTD.  -LLE xrays negative for free air; CT read negative for free air.

## 2018-02-14 NOTE — PROGRESS NOTE ADULT - ASSESSMENT
64 y/o male with hx of CKD (unknown stage), HTN, (DM (HbA1C 5.9), R. BKA from ulcer in 2009, who p/w worsening left foot found to have JANY on admission. 64 y/o male with hx of( CKD cr 2.3 BL), HTN, (DM (HbA1C 5.9), R. BKA from ulcer in 2009, who p/w worsening left foot found to have JANY on admission.

## 2018-02-14 NOTE — PROGRESS NOTE ADULT - SUBJECTIVE AND OBJECTIVE BOX
Patient is a 63y old  Male who presents with a chief complaint of 63M PMH DM, HTN, CKD p/w L foot pain x 3 days. (12 Feb 2018 19:33)    f/u: possible foot infection and (+) cx    interval history/ROS:  denies much foot pain.  no n/v/d.  no rash else where.  no fever/chills.  Rest of ROS otherwise negative.    Allergies  No Known Allergies    ANTIMICROBIALS:    piperacillin/tazobactam IVPB. 3.375 every 12 hours  vanc x1 2/12 and 2/13    MEDICATIONS  (STANDING):  atorvastatin 40 at bedtime  heparin  Injectable 5000 every 8 hours  insulin lispro (HumaLOG) corrective regimen sliding scale  three times a day before meals  labetalol 100 two times a day  NIFEdipine XL 30 daily    PRN  acetaminophen   Tablet 650 milliGRAM(s) Oral every 6 hours PRN  acetaminophen   Tablet. 650 milliGRAM(s) Oral every 6 hours PRN  dextrose Gel 1 Dose(s) Oral once PRN  glucagon  Injectable 1 milliGRAM(s) IntraMuscular once PRN    PHYSICAL EXAM:  General: non-toxic  HEAD/EYES: anicteric  ENT:  supple; no thrush  Cardiovascular:   S1, S2, no murmur  Respiratory:  clear bilaterally  GI:  soft, non-tender, normal bowel sounds  :  no CVA tenderness   Musculoskeletal:  no synovitis  Neurologic:  grossly non-focal  Skin:  left dorsal foot with open wound where blister was deroofed; some mottling of the underlying tissue. partially insensate? no pus; no malodor; no evidence of cellulitis  Lymph: no lymphadenopathy  Psychiatric:  appropriate affect  Vascular:  no phlebitis, nonpalpable pedal pulses                                        8.1    9.20  )-----------( 162      ( 14 Feb 2018 06:00 )             25.3 02-14    143  |  107  |  63  ----------------------------<  88  4.7   |  17  |  3.89  Ca    8.2      14 Feb 2018 06:00Phos  5.0     02-13Mg     1.9     02-13  TPro  6.8  /  Alb  3.3  /  TBili  0.3  /  DBili  x   /  AST  26  /  ALT  27  /  AlkPhos  94  02-14    CRP-110  ESR 96    MICROBIOLOGY:  Culture - Abscess with Gram Stain (02.12.18 @ 18:45)    Specimen Source: OTHER    Gram Stain Result:   GPCPR^Gram Pos Cocci in Pairs  QUANTITY OF BACTERIA SEEN: FEW (2+)  WBC^White Blood Cells  QNTY CELLS IN GRAM STAIN: RARE (1+)    Culture Results:   MANY  STAU^Staphylococcus aureus  GPCID^Gram Pos Cocci to be IDed  GNRID^Gram Neg Nabeel To Be Identified  JOSE^Corynebacterium species    Culture - Blood (02.12.18 @ 17:32)    Culture - Blood:   NO ORGANISMS ISOLATED  NO ORGANISMS ISOLATED AT 24 HOURS    Specimen Source: BLOOD VENOUS    Culture - Blood (02.12.18 @ 17:12)    Culture - Blood:   NO ORGANISMS ISOLATED  NO ORGANISMS ISOLATED AT 24 HOURS    Specimen Source: BLOOD    Vancomycin Level, Trough: 12.0 ug/mL (02-13-18 @ 07:11)    RADIOLOGY:  Xray Foot AP + Lateral + Oblique, Left (02.12.18 @ 19:48)  IMPRESSION:  No acute displaced fracture. No radiographic evidence of osteomyelitis.

## 2018-02-14 NOTE — PROGRESS NOTE ADULT - SUBJECTIVE AND OBJECTIVE BOX
Progress Note    JAMES PATRICK 63y (1955) Male [id number]  02-12-18 (2d)    Dr. Galvez, [facility] PGY1  Pager# [phone]    Subjective:  No acute events overnight. Patient seen and examined at bedside. Patient feeling well. No complaints. Denies nausea, vomiting, abdominal pain, fevers, chills, SOB.    CONSTITUTIONAL: No fever, weight loss, or fatigue  EYES: No eye pain, visual disturbances, or discharge  ENMT:  No difficulty hearing, tinnitus, vertigo; No sinus or throat pain  NECK: No pain or stiffness  RESPIRATORY: No cough, wheezing, chills or hemoptysis; No shortness of breath  CARDIOVASCULAR: No chest pain, palpitations, dizziness, or leg swelling  GASTROINTESTINAL: No abdominal or epigastric pain. No nausea, vomiting, or hematemesis; No diarrhea or constipation. No melena or hematochezia.  GENITOURINARY: No dysuria, frequency, hematuria, or incontinence  NEUROLOGICAL: No headaches, memory loss, loss of strength, numbness, or tremors  ENDOCRINE: No heat or cold intolerance; No hair loss  MUSCULOSKELETAL: No joint pain or swelling; No muscle, back, or extremity pain      PAST MEDICAL & SURGICAL HISTORY:  Kidney disease (N28.9)  HTN (hypertension) (I10)  DM (diabetes mellitus) (E11.9)  S/P BKA (below knee amputation) unilateral, right (Z89.511)  No significant past surgical history ([id number])    acetaminophen   Tablet 650 milliGRAM(s) Oral every 6 hours PRN  acetaminophen   Tablet. 650 milliGRAM(s) Oral every 6 hours PRN  atorvastatin 40 milliGRAM(s) Oral at bedtime  cyanocobalamin 1000 MICROGram(s) Oral daily  dextrose 5%. 1000 milliLiter(s) IV Continuous <Continuous>  dextrose 50% Injectable 12.5 Gram(s) IV Push once  dextrose 50% Injectable 25 Gram(s) IV Push once  dextrose 50% Injectable 25 Gram(s) IV Push once  dextrose Gel 1 Dose(s) Oral once PRN  glucagon  Injectable 1 milliGRAM(s) IntraMuscular once PRN  heparin  Injectable 5000 Unit(s) SubCutaneous every 8 hours  influenza   Vaccine 0.5 milliLiter(s) IntraMuscular once  insulin lispro (HumaLOG) corrective regimen sliding scale   SubCutaneous three times a day before meals  labetalol 100 milliGRAM(s) Oral two times a day  NIFEdipine XL 30 milliGRAM(s) Oral daily  piperacillin/tazobactam IVPB. 3.375 Gram(s) IV Intermittent every 12 hours  silver sulfADIAZINE 1% Cream 1 Application(s) Topical daily    Objective:  T(C): 37.2 (02-14-18 @ 05:35), Max: 37.2 (02-14-18 @ 05:35)  HR: 83 (02-14-18 @ 05:35) (74 - 83)  BP: 155/69 (02-14-18 @ 05:35) (126/56 - 155/69)  RR: 18 (02-14-18 @ 05:35) (17 - 18)  SpO2: 94% (02-14-18 @ 05:35) (94% - 98%)    GENERAL: NAD, well-developed  HEAD:  Atraumatic, Normocephalic  EYES: EOMI, PERRLA, conjunctiva and sclera clear  NECK: Supple, No JVD  CHEST/LUNG: Clear to auscultation bilaterally; No wheeze  HEART: Regular rate and rhythm; No murmurs, rubs, or gallops  ABDOMEN: Soft, Nontender, Nondistended; Bowel sounds present  EXTREMITIES:  2+ Peripheral Pulses, No clubbing, cyanosis, or edema  PSYCH: AAOx3  NEUROLOGY: non-focal  SKIN: No rashes or lesions        CAPILLARY BLOOD GLUCOSE      (02-14 @ 06:00)                      8.1  9.20 )-----------( 162                 25.3    Neutrophils = 6.52 (71.0%)  Lymphocytes = 1.29 (14.0%)  Eosinophils = 0.28 (3.0%)  Basophils = 0.02 (0.2%)  Monocytes = 1.05 (11.4%)  Bands = --%    02-14    143  |  107  |  63<H>  ----------------------------<  88  4.7   |  17<L>  |  3.89<H>    Ca    8.2<L>      14 Feb 2018 06:00  Phos  5.0     02-13  Mg     1.9     02-13    TPro  6.8  /  Alb  3.3  /  TBili  0.3  /  DBili  x   /  AST  26  /  ALT  27  /  AlkPhos  94  02-14          RVP:          Tox:             WBC Trend: 9.20<--, 10.54<--, 13.04<--    Hb Trend: 8.1<--, 7.5<--, 9.6<--        New imaging in last 24 hours:  Consult notes reviewed: Progress Note    JAMES PATRICK 63y (1955) Male [id number]  02-12-18 (2d)    Dr. Galvez, [facility] PGY1  Pager# [phone]    Subjective:  No acute events overnight. Patient seen and examined at bedside. Patient feeling well. No complaints except the foot pain. Denies nausea, vomiting, abdominal pain, fevers, chills, SOB.    CONSTITUTIONAL: No fever, weight loss, or fatigue  EYES: No eye pain, visual disturbances, or discharge  ENMT:  No difficulty hearing, tinnitus, vertigo; No sinus or throat pain  NECK: No pain or stiffness  RESPIRATORY: No cough, wheezing, chills or hemoptysis; No shortness of breath  CARDIOVASCULAR: No chest pain, palpitations, dizziness, or leg swelling  GASTROINTESTINAL: No abdominal or epigastric pain. No nausea, vomiting, or hematemesis; No diarrhea or constipation. No melena or hematochezia.  GENITOURINARY: No dysuria, frequency, hematuria, or incontinence  NEUROLOGICAL: No headaches, memory loss, loss of strength, numbness, or tremors  ENDOCRINE: No heat or cold intolerance; No hair loss  MUSCULOSKELETAL: No joint pain or swelling; No muscle, back, or extremity pain      PAST MEDICAL & SURGICAL HISTORY:  Kidney disease (N28.9)  HTN (hypertension) (I10)  DM (diabetes mellitus) (E11.9)  S/P BKA (below knee amputation) unilateral, right (Z89.511)  No significant past surgical history ([id number])    acetaminophen   Tablet 650 milliGRAM(s) Oral every 6 hours PRN  acetaminophen   Tablet. 650 milliGRAM(s) Oral every 6 hours PRN  atorvastatin 40 milliGRAM(s) Oral at bedtime  cyanocobalamin 1000 MICROGram(s) Oral daily  dextrose 5%. 1000 milliLiter(s) IV Continuous <Continuous>  dextrose 50% Injectable 12.5 Gram(s) IV Push once  dextrose 50% Injectable 25 Gram(s) IV Push once  dextrose 50% Injectable 25 Gram(s) IV Push once  dextrose Gel 1 Dose(s) Oral once PRN  glucagon  Injectable 1 milliGRAM(s) IntraMuscular once PRN  heparin  Injectable 5000 Unit(s) SubCutaneous every 8 hours  influenza   Vaccine 0.5 milliLiter(s) IntraMuscular once  insulin lispro (HumaLOG) corrective regimen sliding scale   SubCutaneous three times a day before meals  labetalol 100 milliGRAM(s) Oral two times a day  NIFEdipine XL 30 milliGRAM(s) Oral daily  piperacillin/tazobactam IVPB. 3.375 Gram(s) IV Intermittent every 12 hours  silver sulfADIAZINE 1% Cream 1 Application(s) Topical daily    Objective:  T(C): 37.2 (02-14-18 @ 05:35), Max: 37.2 (02-14-18 @ 05:35)  HR: 83 (02-14-18 @ 05:35) (74 - 83)  BP: 155/69 (02-14-18 @ 05:35) (126/56 - 155/69)  RR: 18 (02-14-18 @ 05:35) (17 - 18)  SpO2: 94% (02-14-18 @ 05:35) (94% - 98%)    GENERAL: NAD, well-developed  HEAD:  Atraumatic, Normocephalic  EYES: EOMI, PERRLA, conjunctiva and sclera clear  NECK: Supple, No JVD  CHEST/LUNG: Clear to auscultation bilaterally; No wheeze  HEART: Regular rate and rhythm; No murmurs, rubs, or gallops  ABDOMEN: Soft, Nontender, Nondistended; Bowel sounds present  EXTREMITIES:  LLE: +BKA from mid-shin. RLE: +granulation tissue on the wound. Non-tender. No bleeding or purulent drainage.   PSYCH: AAOx3  NEUROLOGY: non-focal  SKIN: No rashes or lesions        CAPILLARY BLOOD GLUCOSE      (02-14 @ 06:00)                      8.1  9.20 )-----------( 162                 25.3    Neutrophils = 6.52 (71.0%)  Lymphocytes = 1.29 (14.0%)  Eosinophils = 0.28 (3.0%)  Basophils = 0.02 (0.2%)  Monocytes = 1.05 (11.4%)  Bands = --%    02-14    143  |  107  |  63<H>  ----------------------------<  88  4.7   |  17<L>  |  3.89<H>    Ca    8.2<L>      14 Feb 2018 06:00  Phos  5.0     02-13  Mg     1.9     02-13    TPro  6.8  /  Alb  3.3  /  TBili  0.3  /  DBili  x   /  AST  26  /  ALT  27  /  AlkPhos  94  02-14          RVP:          Tox:             WBC Trend: 9.20<--, 10.54<--, 13.04<--    Hb Trend: 8.1<--, 7.5<--, 9.6<--        New imaging in last 24 hours:  Consult notes reviewed:

## 2018-02-14 NOTE — PROGRESS NOTE ADULT - ATTENDING COMMENTS
I discussed this with the patient, Medicine; Dr. Danielle, and Vascular; Dr. Crump. all agreed to hold off the LE angiogram for next week and hopefully by then his ATN would improve..

## 2018-02-14 NOTE — PROGRESS NOTE ADULT - SUBJECTIVE AND OBJECTIVE BOX
Patient is a 63y old  Male who presents with a chief complaint of 63M PMH DM, HTN, CKD p/w L foot pain x 3 days. (12 Feb 2018 19:33)      Vascular Surgery Attending Progress Note    Interval HPI: pt c/o left toe 1 mild to mod discomfort     Medications:  acetaminophen   Tablet 650 milliGRAM(s) Oral every 6 hours PRN  acetaminophen   Tablet. 650 milliGRAM(s) Oral every 6 hours PRN  atorvastatin 40 milliGRAM(s) Oral at bedtime  cyanocobalamin 1000 MICROGram(s) Oral daily  dextrose 5%. 1000 milliLiter(s) IV Continuous <Continuous>  dextrose 50% Injectable 12.5 Gram(s) IV Push once  dextrose 50% Injectable 25 Gram(s) IV Push once  dextrose 50% Injectable 25 Gram(s) IV Push once  dextrose Gel 1 Dose(s) Oral once PRN  glucagon  Injectable 1 milliGRAM(s) IntraMuscular once PRN  heparin  Injectable 5000 Unit(s) SubCutaneous every 8 hours  influenza   Vaccine 0.5 milliLiter(s) IntraMuscular once  insulin lispro (HumaLOG) corrective regimen sliding scale   SubCutaneous three times a day before meals  labetalol 100 milliGRAM(s) Oral two times a day  lactobacillus acidophilus 1 Tablet(s) Oral daily  NIFEdipine XL 30 milliGRAM(s) Oral daily  piperacillin/tazobactam IVPB. 3.375 Gram(s) IV Intermittent every 12 hours  silver sulfADIAZINE 1% Cream 1 Application(s) Topical daily      Vital Signs Last 24 Hrs  T(C): 37.2 (14 Feb 2018 05:35), Max: 37.2 (14 Feb 2018 05:35)  T(F): 98.9 (14 Feb 2018 05:35), Max: 98.9 (14 Feb 2018 05:35)  HR: 83 (14 Feb 2018 05:35) (74 - 83)  BP: 155/69 (14 Feb 2018 05:35) (126/56 - 155/69)  BP(mean): --  RR: 18 (14 Feb 2018 05:35) (17 - 18)  SpO2: 94% (14 Feb 2018 05:35) (94% - 98%)  I&O's Summary      Physical Exam:  Neuro  A&Ox3 VSS  Vascular:   limited healing   Musculoskeletal: wnl     LABS:                        8.1    9.20  )-----------( 162      ( 14 Feb 2018 06:00 )             25.3     02-14    143  |  107  |  63<H>  ----------------------------<  88  4.7   |  17<L>  |  3.89<H>    Ca    8.2<L>      14 Feb 2018 06:00  Phos  5.0     02-13  Mg     1.9     02-13    TPro  6.8  /  Alb  3.3  /  TBili  0.3  /  DBili  x   /  AST  26  /  ALT  27  /  AlkPhos  94  02-14        BARI NICOLAS MD  [phone]

## 2018-02-14 NOTE — PROGRESS NOTE ADULT - SUBJECTIVE AND OBJECTIVE BOX
pt seen at bedside in NAD. dressing to left foot c/d/i  ulceration plantar foot noted from forefoot to midfoot  superficial appears to be a burn no pus erythema decreased mild edema  applied SSD with DSD  follow up angio  will continue to follow 2-3x weeks if foot worsens please call [phone] ASAP

## 2018-02-14 NOTE — PROGRESS NOTE ADULT - ASSESSMENT
63M long standing DM  (A1C was 5.9), HTN, CKD, R BKA in 2009 presents with 3 day history of worsening foot pain.  Noted to have extensive blistering of the plantar foot which was deroofed and swabbed.   So far staph aureus.  unclear if this is colonization as wound does not appear grossly infected or true infection, however, in light of the significant pain he had on presentation, will treat as soft tissue infection.  Short course of antibiotics for possible superinfection.  Otherwise, clinically stable.    Suggest:  - vancomycin/zosyn pending cultures  - vancomycin by level  - f/u blood cultures  - f/u CT leg/foot

## 2018-02-14 NOTE — PROGRESS NOTE ADULT - SUBJECTIVE AND OBJECTIVE BOX
Nephrology progress note    No acute events overnight. Pt. denies fevers, n/v/d, sob, decreased PO intake, decreased urination.     Allergies:  No Known Allergies    Hospital Medications:   MEDICATIONS  (STANDING):  atorvastatin 40 milliGRAM(s) Oral at bedtime  clindamycin IVPB 900 milliGRAM(s) IV Intermittent every 8 hours  clindamycin IVPB      cyanocobalamin 1000 MICROGram(s) Oral daily  dextrose 5%. 1000 milliLiter(s) (50 mL/Hr) IV Continuous <Continuous>  dextrose 50% Injectable 12.5 Gram(s) IV Push once  dextrose 50% Injectable 25 Gram(s) IV Push once  dextrose 50% Injectable 25 Gram(s) IV Push once  heparin  Injectable 5000 Unit(s) SubCutaneous every 8 hours  influenza   Vaccine 0.5 milliLiter(s) IntraMuscular once  insulin lispro (HumaLOG) corrective regimen sliding scale   SubCutaneous three times a day before meals  labetalol 100 milliGRAM(s) Oral two times a day  NIFEdipine XL 30 milliGRAM(s) Oral daily  piperacillin/tazobactam IVPB. 3.375 Gram(s) IV Intermittent every 12 hours  silver sulfADIAZINE 1% Cream 1 Application(s) Topical daily    REVIEW OF SYSTEMS:  CONSTITUTIONAL: No weakness, fevers or chills  EYES/ENT: No visual changes;  No vertigo or throat pain   NECK: No pain or stiffness  RESPIRATORY: No cough, wheezing, hemoptysis; No shortness of breath  CARDIOVASCULAR: No chest pain or palpitations.  GASTROINTESTINAL: No abdominal or epigastric pain. No nausea, vomiting, or hematemesis; No diarrhea or constipation. No melena or hematochezia.  GENITOURINARY: No dysuria, frequency, foamy urine, urinary urgency, incontinence or hematuria  NEUROLOGICAL: No numbness or weakness  SKIN: No itching, burning, rashes, or lesions   VASCULAR: No bilateral lower extremity edema.   All other review of systems is negative unless indicated above.    VITALS:  T(F): 98.9 (02-14-18 @ 05:35), Max: 98.9 (02-14-18 @ 05:35)  HR: 83 (02-14-18 @ 05:35)  BP: 155/69 (02-14-18 @ 05:35)  RR: 18 (02-14-18 @ 05:35)  SpO2: 94% (02-14-18 @ 05:35)  Wt(kg): --    Height (cm): 167.64 (02-13 @ 20:58)  Weight (kg): 75 (02-13 @ 20:58)  BMI (kg/m2): 26.7 (02-13 @ 20:58)  BSA (m2): 1.84 (02-13 @ 20:58)    PHYSICAL EXAM:  	Gen: NAD,   	HEENT: MMM, supple neck  	Pulm: CTA B/L  	CV: RRR, S1S2; no rub  	Abd: +BS, soft, nontender/nondistended  	LE: R . BKA, LLE dressed to ankle, 1+ pitting edema, no clubbing  	Neuro: No focal deficits, intact gait  	Psych: Normal affect and mood  	Skin: Warm, without rashes  LABS:  02-14    143  |  107  |  63<H>  ----------------------------<  88  4.7   |  17<L>  |  3.89<H>    Ca    8.2<L>      14 Feb 2018 06:00  Phos  5.0     02-13  Mg     1.9     02-13    TPro  6.8  /  Alb  3.3  /  TBili  0.3  /  DBili      /  AST  26  /  ALT  27  /  AlkPhos  94  02-14                          8.1    9.20  )-----------( 162      ( 14 Feb 2018 06:00 )             25.3       Urine Studies:      RADIOLOGY & ADDITIONAL STUDIES: Nephrology progress note    No acute events overnight. Pt. denies fevers, n/v/d, sob, decreased PO intake, decreased urination.     Allergies:  No Known Allergies    Hospital Medications:   MEDICATIONS  (STANDING):  atorvastatin 40 milliGRAM(s) Oral at bedtime  clindamycin IVPB 900 milliGRAM(s) IV Intermittent every 8 hours  clindamycin IVPB      cyanocobalamin 1000 MICROGram(s) Oral daily  dextrose 5%. 1000 milliLiter(s) (50 mL/Hr) IV Continuous <Continuous>  dextrose 50% Injectable 12.5 Gram(s) IV Push once  dextrose 50% Injectable 25 Gram(s) IV Push once  dextrose 50% Injectable 25 Gram(s) IV Push once  heparin  Injectable 5000 Unit(s) SubCutaneous every 8 hours  influenza   Vaccine 0.5 milliLiter(s) IntraMuscular once  insulin lispro (HumaLOG) corrective regimen sliding scale   SubCutaneous three times a day before meals  labetalol 100 milliGRAM(s) Oral two times a day  NIFEdipine XL 30 milliGRAM(s) Oral daily  piperacillin/tazobactam IVPB. 3.375 Gram(s) IV Intermittent every 12 hours  silver sulfADIAZINE 1% Cream 1 Application(s) Topical daily    REVIEW OF SYSTEMS:  CONSTITUTIONAL: No weakness, fevers or chills  EYES/ENT: No visual changes;    NECK: No pain or stiffness  RESPIRATORY: No cough, wheezing,   CARDIOVASCULAR: No chest pain or palpitations.  GASTROINTESTINAL: No abdominal or epigastric pain.  GENITOURINARY: No dysuria, frequency,   VASCULAR: pain L leg  All other review of systems is negative unless indicated above.    VITALS:  T(F): 98.9 (02-14-18 @ 05:35), Max: 98.9 (02-14-18 @ 05:35)  HR: 83 (02-14-18 @ 05:35)  BP: 155/69 (02-14-18 @ 05:35)  RR: 18 (02-14-18 @ 05:35)  SpO2: 94% (02-14-18 @ 05:35)  Wt(kg): --    Height (cm): 167.64 (02-13 @ 20:58)  Weight (kg): 75 (02-13 @ 20:58)  BMI (kg/m2): 26.7 (02-13 @ 20:58)  BSA (m2): 1.84 (02-13 @ 20:58)    PHYSICAL EXAM:  	Gen: NAD,   	HEENT: MMM, supple neck  	Pulm: CTA B/L  	CV: RRR, S1S2; no rub  	Abd: +BS, soft, nontender/nondistended  	LE: R . BKA, LLE dressed to ankle, 1+ pitting edema, no clubbing  	Neuro: No focal deficits, intact gait  	Psych: Normal affect and mood  	Skin: Warm, without rashes  LABS:  02-14    143  |  107  |  63<H>  ----------------------------<  88  4.7   |  17<L>  |  3.89<H>    Ca    8.2<L>      14 Feb 2018 06:00  Phos  5.0     02-13  Mg     1.9     02-13    TPro  6.8  /  Alb  3.3  /  TBili  0.3  /  DBili      /  AST  26  /  ALT  27  /  AlkPhos  94  02-14                          8.1    9.20  )-----------( 162      ( 14 Feb 2018 06:00 )             25.3

## 2018-02-14 NOTE — PROGRESS NOTE ADULT - ATTENDING COMMENTS
Patient seen and examined.  Case discussed with house staff.  Agree with above as edited.  Patient with left foot infection with severe sepsis and lactic acidosis with JANY on CKD III.  Vascular, podiatry, nephrology and infectious disease input appreciated.  CT does not show any abscess, nec fasc or evidence of acute osteomyelitis.  d/c clinda. c/w IV vanco by level and zosyn pending speciation.  JANY on CKD III - suspect an ATN from underlying sepsis - d/w renal. trend Cr  Appreciate vasc recs - will defer angio until Cr stable - d/w renal.

## 2018-02-14 NOTE — PROGRESS NOTE ADULT - PROBLEM SELECTOR PLAN 1
JANY on CKD (unclear stage) -April 2017 Cr. ~1.5, currently 3.71  -Creatinine July 2017 per PMD's office 2.30, pt. with anemia, hypocalcemia, hyperphosphatemia indicating chronicity of renal disease  -likely ATN with superimposed pre-renal in the setting of sepsis, Cr. increased today   -recommendations: renal US, awaiting urine collection for microscopic analysis, I/Os, monitor daily BMPs, avoid RCA, NSAIDs, nephrotoxins, renally dose medications JANY on CKD (unclear stage) -April 2017 Cr. ~1.5, currently 3.71  -Creatinine July 2017 per PMD's office 2.30, pt. with anemia, hypocalcemia, hyperphosphatemia indicating chronicity of renal disease  -likely ATN with superimposed pre-renal in the setting of sepsis, Cr. increased today, renal US with no abnormalities, microscopic urine analysis with granular casts c/w ATN  -recommendations: hold off angiogram until creatinine improves, monitor daily BMPs, avoid RCA, NSAIDs, nephrotoxins, renally dose medications JANY on CKD  -currently 3.71  -Creatinine July 2017 per PMD's office 2.30, pt. with anemia, hypocalcemia, hyperphosphatemia indicating chronicity of renal disease. on microspcopy Granular casts  -ATN with superimposed pre-renal in the setting of sepsis, Cr. increased today, renal US with no abnormalities,   -recommendations: hold off angiogram until creatinine improves, monitor daily BMPs, avoid RCA, NSAIDs, nephrotoxins, renally dose medications

## 2018-02-15 DIAGNOSIS — R05 COUGH: ICD-10-CM

## 2018-02-15 LAB
ALBUMIN SERPL ELPH-MCNC: 3.3 G/DL — SIGNIFICANT CHANGE UP (ref 3.3–5)
ALP SERPL-CCNC: 90 U/L — SIGNIFICANT CHANGE UP (ref 40–120)
ALT FLD-CCNC: 25 U/L — SIGNIFICANT CHANGE UP (ref 4–41)
AST SERPL-CCNC: 22 U/L — SIGNIFICANT CHANGE UP (ref 4–40)
B PERT DNA SPEC QL NAA+PROBE: SIGNIFICANT CHANGE UP
BASOPHILS # BLD AUTO: 0.04 K/UL — SIGNIFICANT CHANGE UP (ref 0–0.2)
BASOPHILS # BLD AUTO: 0.04 K/UL — SIGNIFICANT CHANGE UP (ref 0–0.2)
BASOPHILS NFR BLD AUTO: 0.5 % — SIGNIFICANT CHANGE UP (ref 0–2)
BASOPHILS NFR BLD AUTO: 0.5 % — SIGNIFICANT CHANGE UP (ref 0–2)
BILIRUB SERPL-MCNC: 0.3 MG/DL — SIGNIFICANT CHANGE UP (ref 0.2–1.2)
BUN SERPL-MCNC: 58 MG/DL — HIGH (ref 7–23)
C PNEUM DNA SPEC QL NAA+PROBE: NOT DETECTED — SIGNIFICANT CHANGE UP
CALCIUM SERPL-MCNC: 8.4 MG/DL — SIGNIFICANT CHANGE UP (ref 8.4–10.5)
CHLORIDE SERPL-SCNC: 111 MMOL/L — HIGH (ref 98–107)
CO2 SERPL-SCNC: 17 MMOL/L — LOW (ref 22–31)
CREAT SERPL-MCNC: 4.11 MG/DL — HIGH (ref 0.5–1.3)
EOSINOPHIL # BLD AUTO: 0.14 K/UL — SIGNIFICANT CHANGE UP (ref 0–0.5)
EOSINOPHIL # BLD AUTO: 0.17 K/UL — SIGNIFICANT CHANGE UP (ref 0–0.5)
EOSINOPHIL NFR BLD AUTO: 1.7 % — SIGNIFICANT CHANGE UP (ref 0–6)
EOSINOPHIL NFR BLD AUTO: 2.2 % — SIGNIFICANT CHANGE UP (ref 0–6)
FLUAV H1 2009 PAND RNA SPEC QL NAA+PROBE: NOT DETECTED — SIGNIFICANT CHANGE UP
FLUAV H1 RNA SPEC QL NAA+PROBE: NOT DETECTED — SIGNIFICANT CHANGE UP
FLUAV H3 RNA SPEC QL NAA+PROBE: NOT DETECTED — SIGNIFICANT CHANGE UP
FLUAV SUBTYP SPEC NAA+PROBE: SIGNIFICANT CHANGE UP
FLUBV RNA SPEC QL NAA+PROBE: NOT DETECTED — SIGNIFICANT CHANGE UP
GLUCOSE BLDC GLUCOMTR-MCNC: 134 MG/DL — HIGH (ref 70–99)
GLUCOSE BLDC GLUCOMTR-MCNC: 154 MG/DL — HIGH (ref 70–99)
GLUCOSE BLDC GLUCOMTR-MCNC: 202 MG/DL — HIGH (ref 70–99)
GLUCOSE BLDC GLUCOMTR-MCNC: 87 MG/DL — SIGNIFICANT CHANGE UP (ref 70–99)
GLUCOSE SERPL-MCNC: 85 MG/DL — SIGNIFICANT CHANGE UP (ref 70–99)
GRAM STN SPEC: SIGNIFICANT CHANGE UP
HADV DNA SPEC QL NAA+PROBE: NOT DETECTED — SIGNIFICANT CHANGE UP
HCOV 229E RNA SPEC QL NAA+PROBE: NOT DETECTED — SIGNIFICANT CHANGE UP
HCOV HKU1 RNA SPEC QL NAA+PROBE: NOT DETECTED — SIGNIFICANT CHANGE UP
HCOV NL63 RNA SPEC QL NAA+PROBE: NOT DETECTED — SIGNIFICANT CHANGE UP
HCOV OC43 RNA SPEC QL NAA+PROBE: NOT DETECTED — SIGNIFICANT CHANGE UP
HCT VFR BLD CALC: 24.4 % — LOW (ref 39–50)
HCT VFR BLD CALC: 24.7 % — LOW (ref 39–50)
HGB BLD-MCNC: 7.6 G/DL — LOW (ref 13–17)
HGB BLD-MCNC: 7.8 G/DL — LOW (ref 13–17)
HMPV RNA SPEC QL NAA+PROBE: NOT DETECTED — SIGNIFICANT CHANGE UP
HPIV1 RNA SPEC QL NAA+PROBE: NOT DETECTED — SIGNIFICANT CHANGE UP
HPIV2 RNA SPEC QL NAA+PROBE: NOT DETECTED — SIGNIFICANT CHANGE UP
HPIV3 RNA SPEC QL NAA+PROBE: NOT DETECTED — SIGNIFICANT CHANGE UP
HPIV4 RNA SPEC QL NAA+PROBE: NOT DETECTED — SIGNIFICANT CHANGE UP
IMM GRANULOCYTES # BLD AUTO: 0.04 # — SIGNIFICANT CHANGE UP
IMM GRANULOCYTES # BLD AUTO: 0.06 # — SIGNIFICANT CHANGE UP
IMM GRANULOCYTES NFR BLD AUTO: 0.5 % — SIGNIFICANT CHANGE UP (ref 0–1.5)
IMM GRANULOCYTES NFR BLD AUTO: 0.7 % — SIGNIFICANT CHANGE UP (ref 0–1.5)
LYMPHOCYTES # BLD AUTO: 1.03 K/UL — SIGNIFICANT CHANGE UP (ref 1–3.3)
LYMPHOCYTES # BLD AUTO: 1.22 K/UL — SIGNIFICANT CHANGE UP (ref 1–3.3)
LYMPHOCYTES # BLD AUTO: 12.5 % — LOW (ref 13–44)
LYMPHOCYTES # BLD AUTO: 15.6 % — SIGNIFICANT CHANGE UP (ref 13–44)
M PNEUMO DNA SPEC QL NAA+PROBE: NOT DETECTED — SIGNIFICANT CHANGE UP
MCHC RBC-ENTMCNC: 27.3 PG — SIGNIFICANT CHANGE UP (ref 27–34)
MCHC RBC-ENTMCNC: 28 PG — SIGNIFICANT CHANGE UP (ref 27–34)
MCHC RBC-ENTMCNC: 31.1 % — LOW (ref 32–36)
MCHC RBC-ENTMCNC: 31.6 % — LOW (ref 32–36)
MCV RBC AUTO: 87.8 FL — SIGNIFICANT CHANGE UP (ref 80–100)
MCV RBC AUTO: 88.5 FL — SIGNIFICANT CHANGE UP (ref 80–100)
METHOD TYPE: SIGNIFICANT CHANGE UP
METHOD TYPE: SIGNIFICANT CHANGE UP
MONOCYTES # BLD AUTO: 1.03 K/UL — HIGH (ref 0–0.9)
MONOCYTES # BLD AUTO: 1.1 K/UL — HIGH (ref 0–0.9)
MONOCYTES NFR BLD AUTO: 13.2 % — SIGNIFICANT CHANGE UP (ref 2–14)
MONOCYTES NFR BLD AUTO: 13.3 % — SIGNIFICANT CHANGE UP (ref 2–14)
NEUTROPHILS # BLD AUTO: 5.33 K/UL — SIGNIFICANT CHANGE UP (ref 1.8–7.4)
NEUTROPHILS # BLD AUTO: 5.87 K/UL — SIGNIFICANT CHANGE UP (ref 1.8–7.4)
NEUTROPHILS NFR BLD AUTO: 68 % — SIGNIFICANT CHANGE UP (ref 43–77)
NEUTROPHILS NFR BLD AUTO: 71.3 % — SIGNIFICANT CHANGE UP (ref 43–77)
NRBC # FLD: 0 — SIGNIFICANT CHANGE UP
NRBC # FLD: 0 — SIGNIFICANT CHANGE UP
ORGANISM # SPEC MICROSCOPIC CNT: SIGNIFICANT CHANGE UP
PLATELET # BLD AUTO: 187 K/UL — SIGNIFICANT CHANGE UP (ref 150–400)
PLATELET # BLD AUTO: 207 K/UL — SIGNIFICANT CHANGE UP (ref 150–400)
PMV BLD: 11.7 FL — SIGNIFICANT CHANGE UP (ref 7–13)
PMV BLD: 11.9 FL — SIGNIFICANT CHANGE UP (ref 7–13)
POTASSIUM SERPL-MCNC: 5 MMOL/L — SIGNIFICANT CHANGE UP (ref 3.5–5.3)
POTASSIUM SERPL-SCNC: 5 MMOL/L — SIGNIFICANT CHANGE UP (ref 3.5–5.3)
PROT SERPL-MCNC: 7 G/DL — SIGNIFICANT CHANGE UP (ref 6–8.3)
RBC # BLD: 2.78 M/UL — LOW (ref 4.2–5.8)
RBC # BLD: 2.79 M/UL — LOW (ref 4.2–5.8)
RBC # FLD: 14.1 % — SIGNIFICANT CHANGE UP (ref 10.3–14.5)
RBC # FLD: 14.2 % — SIGNIFICANT CHANGE UP (ref 10.3–14.5)
RSV RNA SPEC QL NAA+PROBE: NOT DETECTED — SIGNIFICANT CHANGE UP
RV+EV RNA SPEC QL NAA+PROBE: NOT DETECTED — SIGNIFICANT CHANGE UP
SODIUM SERPL-SCNC: 145 MMOL/L — SIGNIFICANT CHANGE UP (ref 135–145)
VANCOMYCIN FLD-MCNC: 14.3 UG/ML — SIGNIFICANT CHANGE UP
WBC # BLD: 7.83 K/UL — SIGNIFICANT CHANGE UP (ref 3.8–10.5)
WBC # BLD: 8.24 K/UL — SIGNIFICANT CHANGE UP (ref 3.8–10.5)
WBC # FLD AUTO: 7.83 K/UL — SIGNIFICANT CHANGE UP (ref 3.8–10.5)
WBC # FLD AUTO: 8.24 K/UL — SIGNIFICANT CHANGE UP (ref 3.8–10.5)

## 2018-02-15 PROCEDURE — 99233 SBSQ HOSP IP/OBS HIGH 50: CPT | Mod: GC

## 2018-02-15 PROCEDURE — 99232 SBSQ HOSP IP/OBS MODERATE 35: CPT

## 2018-02-15 PROCEDURE — 71045 X-RAY EXAM CHEST 1 VIEW: CPT | Mod: 26

## 2018-02-15 RX ORDER — SODIUM BICARBONATE 1 MEQ/ML
650 SYRINGE (ML) INTRAVENOUS
Refills: 0 | Status: DISCONTINUED | OUTPATIENT
Start: 2018-02-15 | End: 2018-02-17

## 2018-02-15 RX ORDER — SODIUM CHLORIDE 9 MG/ML
1000 INJECTION, SOLUTION INTRAVENOUS
Refills: 0 | Status: DISCONTINUED | OUTPATIENT
Start: 2018-02-15 | End: 2018-02-15

## 2018-02-15 RX ORDER — VANCOMYCIN HCL 1 G
1000 VIAL (EA) INTRAVENOUS ONCE
Refills: 0 | Status: COMPLETED | OUTPATIENT
Start: 2018-02-15 | End: 2018-02-15

## 2018-02-15 RX ORDER — LABETALOL HCL 100 MG
100 TABLET ORAL THREE TIMES A DAY
Refills: 0 | Status: DISCONTINUED | OUTPATIENT
Start: 2018-02-15 | End: 2018-02-17

## 2018-02-15 RX ADMIN — PIPERACILLIN AND TAZOBACTAM 25 GRAM(S): 4; .5 INJECTION, POWDER, LYOPHILIZED, FOR SOLUTION INTRAVENOUS at 19:25

## 2018-02-15 RX ADMIN — Medication 650 MILLIGRAM(S): at 12:46

## 2018-02-15 RX ADMIN — HEPARIN SODIUM 5000 UNIT(S): 5000 INJECTION INTRAVENOUS; SUBCUTANEOUS at 22:13

## 2018-02-15 RX ADMIN — Medication 100 MILLIGRAM(S): at 13:23

## 2018-02-15 RX ADMIN — Medication 650 MILLIGRAM(S): at 19:39

## 2018-02-15 RX ADMIN — HEPARIN SODIUM 5000 UNIT(S): 5000 INJECTION INTRAVENOUS; SUBCUTANEOUS at 13:16

## 2018-02-15 RX ADMIN — SODIUM CHLORIDE 75 MILLILITER(S): 9 INJECTION, SOLUTION INTRAVENOUS at 13:23

## 2018-02-15 RX ADMIN — Medication 100 MILLIGRAM(S): at 22:13

## 2018-02-15 RX ADMIN — ATORVASTATIN CALCIUM 40 MILLIGRAM(S): 80 TABLET, FILM COATED ORAL at 22:12

## 2018-02-15 RX ADMIN — Medication 1: at 18:16

## 2018-02-15 RX ADMIN — Medication 1 APPLICATION(S): at 18:17

## 2018-02-15 RX ADMIN — Medication 100 MILLIGRAM(S): at 06:26

## 2018-02-15 RX ADMIN — PIPERACILLIN AND TAZOBACTAM 25 GRAM(S): 4; .5 INJECTION, POWDER, LYOPHILIZED, FOR SOLUTION INTRAVENOUS at 06:26

## 2018-02-15 RX ADMIN — Medication 250 MILLIGRAM(S): at 14:52

## 2018-02-15 RX ADMIN — Medication 1 TABLET(S): at 13:16

## 2018-02-15 RX ADMIN — HEPARIN SODIUM 5000 UNIT(S): 5000 INJECTION INTRAVENOUS; SUBCUTANEOUS at 06:26

## 2018-02-15 RX ADMIN — PREGABALIN 1000 MICROGRAM(S): 225 CAPSULE ORAL at 13:16

## 2018-02-15 RX ADMIN — Medication 30 MILLIGRAM(S): at 06:26

## 2018-02-15 NOTE — PROGRESS NOTE ADULT - PROBLEM SELECTOR PLAN 1
JANY on CKD    -Creatinine July 2017 per PMD's office 2.30, pt. with anemia, hypocalcemia, hyperphosphatemia indicating chronicity of renal disease.   -ATN with superimposed pre-renal in the setting of sepsis, renal US with no abnormalities, granular casts seen on microscopy.  -recommendations: plan for LE angiogram next week once Cr. improves, monitor daily BMPs, avoid RCA, NSAIDs, nephrotoxins, renally dose medications JANY on CKD, Cr. worsened today 4.11  -Creatinine July 2017 per PMD's office 2.30, pt. with anemia, hypocalcemia, hyperphosphatemia indicating chronicity of renal disease.   -ATN with superimposed pre-renal in the setting of sepsis, renal US with no abnormalities, granular casts seen on microscopy.  -recommendations: plan for LE angiogram next week once Cr. improves, monitor daily BMPs, avoid RCA, NSAIDs, nephrotoxins, renally dose medications

## 2018-02-15 NOTE — PHYSICAL THERAPY INITIAL EVALUATION ADULT - DIAGNOSIS, PT EVAL
weakness, deconditioning, decreased balance, decreased postural control, decreased ability to perform functional mobility

## 2018-02-15 NOTE — OCCUPATIONAL THERAPY INITIAL EVALUATION ADULT - LIVES WITH, PROFILE
Pt lives with siblings in a house with steps to manage. Pt has stall shower with a shower chair/grab bars. Pt has HHA 7 days/week 9-1pm

## 2018-02-15 NOTE — PROGRESS NOTE ADULT - PROBLEM SELECTOR PLAN 5
-Heparin subq -Pt on oral medications at home.   -Start on sliding scale.  -Hemoglobin a1c: 5.9%  -Diabetic precautions

## 2018-02-15 NOTE — PROGRESS NOTE ADULT - ATTENDING COMMENTS
ATN non-oliguric   monitor electrolytes and volume status. no need for IVF or diuretic.  once cr plateaus patient can have the LE angio.

## 2018-02-15 NOTE — PROGRESS NOTE ADULT - SUBJECTIVE AND OBJECTIVE BOX
Nephrology progress note    No acute events overnight. Pt. denies fevers, chills, cp, sob. Urinating regularly, denies dysuria, hematuria, increased frequency.     Allergies:  No Known Allergies    Hospital Medications:   MEDICATIONS  (STANDING):  atorvastatin 40 milliGRAM(s) Oral at bedtime  cyanocobalamin 1000 MICROGram(s) Oral daily  dextrose 5%. 1000 milliLiter(s) (50 mL/Hr) IV Continuous <Continuous>  dextrose 50% Injectable 12.5 Gram(s) IV Push once  dextrose 50% Injectable 25 Gram(s) IV Push once  dextrose 50% Injectable 25 Gram(s) IV Push once  heparin  Injectable 5000 Unit(s) SubCutaneous every 8 hours  influenza   Vaccine 0.5 milliLiter(s) IntraMuscular once  insulin lispro (HumaLOG) corrective regimen sliding scale   SubCutaneous three times a day before meals  labetalol 100 milliGRAM(s) Oral three times a day  lactobacillus acidophilus 1 Tablet(s) Oral daily  NIFEdipine XL 30 milliGRAM(s) Oral daily  piperacillin/tazobactam IVPB. 3.375 Gram(s) IV Intermittent every 12 hours  silver sulfADIAZINE 1% Cream 1 Application(s) Topical daily    REVIEW OF SYSTEMS:  CONSTITUTIONAL: No weakness, fevers or chills  EYES/ENT: No visual changes;  No vertigo or throat pain   NECK: No pain or stiffness  RESPIRATORY: No cough, wheezing, hemoptysis; No shortness of breath  CARDIOVASCULAR: No chest pain or palpitations.  GASTROINTESTINAL: No abdominal or epigastric pain. No nausea, vomiting, or hematemesis; No diarrhea or constipation. No melena or hematochezia.  GENITOURINARY: No dysuria, frequency, foamy urine, urinary urgency, incontinence or hematuria  NEUROLOGICAL: No numbness or weakness  SKIN: No itching, burning, rashes, or lesions   VASCULAR: No bilateral lower extremity edema.   All other review of systems is negative unless indicated above.    VITALS:  T(F): 99.3 (02-15-18 @ 05:39), Max: 99.3 (02-15-18 @ 05:39)  HR: 87 (02-15-18 @ 05:39)  BP: 150/67 (02-15-18 @ 05:39)  RR: 18 (02-15-18 @ 05:39)  SpO2: 94% (02-15-18 @ 05:39)  Wt(kg): --    PHYSICAL EXAM:  PHYSICAL EXAM:  	Gen: NAD,   	HEENT: MMM, supple neck  	Pulm: CTA B/L  	CV: RRR, S1S2; no rub  	Abd: +BS, soft, nontender/nondistended  	LE: R . BKA with prothesis, LLE dressed to ankle, 1+ pitting edema, no clubbing  	Neuro: No focal deficits, intact gait  	Psych: Normal affect and mood  	Skin: Warm, without rashes    LABS:  02-14    143  |  107  |  63<H>  ----------------------------<  88  4.7   |  17<L>  |  3.89<H>    Ca    8.2<L>      14 Feb 2018 06:00    TPro  6.8  /  Alb  3.3  /  TBili  0.3  /  DBili      /  AST  26  /  ALT  27  /  AlkPhos  94  02-14                          7.8    7.83  )-----------( 187      ( 15 Feb 2018 07:20 )             24.7       Urine Studies:      RADIOLOGY & ADDITIONAL STUDIES: Nephrology progress note    No acute events overnight. Pt. denies fevers, chills, cp, sob. Urinating regularly, denies dysuria, hematuria, increased frequency.     Allergies:  No Known Allergies    Hospital Medications:   MEDICATIONS  (STANDING):  atorvastatin 40 milliGRAM(s) Oral at bedtime  cyanocobalamin 1000 MICROGram(s) Oral daily  dextrose 5%. 1000 milliLiter(s) (50 mL/Hr) IV Continuous <Continuous>  dextrose 50% Injectable 12.5 Gram(s) IV Push once  dextrose 50% Injectable 25 Gram(s) IV Push once  dextrose 50% Injectable 25 Gram(s) IV Push once  heparin  Injectable 5000 Unit(s) SubCutaneous every 8 hours  influenza   Vaccine 0.5 milliLiter(s) IntraMuscular once  insulin lispro (HumaLOG) corrective regimen sliding scale   SubCutaneous three times a day before meals  labetalol 100 milliGRAM(s) Oral three times a day  lactobacillus acidophilus 1 Tablet(s) Oral daily  NIFEdipine XL 30 milliGRAM(s) Oral daily  piperacillin/tazobactam IVPB. 3.375 Gram(s) IV Intermittent every 12 hours  silver sulfADIAZINE 1% Cream 1 Application(s) Topical daily    REVIEW OF SYSTEMS:  EYES/ENT: No visual changes;    NECK: No pain or stiffness  RESPIRATORY: No cough, wheezing,  CARDIOVASCULAR: No chest pain   GASTROINTESTINAL: No abdominal or epigastric pain.  hematochezia.  GENITOURINARY: No dysuria, frequency,   Left foot pain  VITALS:  T(F): 99.3 (02-15-18 @ 05:39), Max: 99.3 (02-15-18 @ 05:39)  HR: 87 (02-15-18 @ 05:39)  BP: 150/67 (02-15-18 @ 05:39)  RR: 18 (02-15-18 @ 05:39)  SpO2: 94% (02-15-18 @ 05:39)  Wt(kg): --    PHYSICAL EXAM:  PHYSICAL EXAM:  	Gen: NAD,   	HEENT: MMM, supple neck  	Pulm: CTA B/L  	CV: RRR, S1S2; no rub  	Abd: +BS, soft, nontender/nondistended  	LE: R . BKA with prothesis, LLE dressed to ankle, 1+ pitting edema, no clubbing  	Neuro: No focal deficits, intact gait  	Psych: Normal affect and mood  	Skin: Warm, without rashes    LABS:  02-14    143  |  107  |  63<H>  ----------------------------<  88  4.7   |  17<L>  |  3.89<H>    Ca    8.2<L>      14 Feb 2018 06:00    TPro  6.8  /  Alb  3.3  /  TBili  0.3  /  DBili      /  AST  26  /  ALT  27  /  AlkPhos  94  02-14                          7.8    7.83  )-----------( 187      ( 15 Feb 2018 07:20 )             24.7       Urine Studies:      RADIOLOGY & ADDITIONAL STUDIES:

## 2018-02-15 NOTE — PROGRESS NOTE ADULT - PROBLEM SELECTOR PLAN 2
-JANY on CKD stage III with ATN  -Renal consult appreciated. Recommend renal ultrasound -no findings. Spun down urine demonstrating granular casts suggesting ATN. Baseline creatinine is 2.3.   -Avoiding nephrotoxins and RCA. -JANY on evolving ATN on CKD stage III  -Renal consult appreciated. Recommend renal ultrasound -no findings. Spun down urine demonstrating granular casts suggesting ATN. Baseline creatinine is 2.3.   -Avoiding nephrotoxins and RCA.

## 2018-02-15 NOTE — PROGRESS NOTE ADULT - PROBLEM SELECTOR PROBLEM 4
Type 2 diabetes mellitus with complication, without long-term current use of insulin Hypertension, unspecified type

## 2018-02-15 NOTE — PROGRESS NOTE ADULT - ASSESSMENT
63M LF toes, forefoot, midfoot blister with cellulitis    - Pt seen and evaluated  - Appearance improving, erythema and edema improved   - Low concern for OM  - Low concern for nec fasc  - Cont IV abx  - Silvadene to LF wound daily  - KENN/PVR pending  - Xrays as above, no OM.  - No pod sx intervention at this time. Cont IV abx and SSD to wounds daily

## 2018-02-15 NOTE — PROGRESS NOTE ADULT - SUBJECTIVE AND OBJECTIVE BOX
Patient is a 63y old  Male who presents with a chief complaint of 63M PMH DM, HTN, CKD p/w L foot pain x 3 days. (12 Feb 2018 19:33)      Vascular Surgery Attending Progress Note    Interval HPI: pt c/o ongoing left toe discomfort    Medications:  acetaminophen   Tablet 650 milliGRAM(s) Oral every 6 hours PRN  acetaminophen   Tablet. 650 milliGRAM(s) Oral every 6 hours PRN  atorvastatin 40 milliGRAM(s) Oral at bedtime  cyanocobalamin 1000 MICROGram(s) Oral daily  dextrose 5% + sodium chloride 0.45%. 1000 milliLiter(s) IV Continuous <Continuous>  dextrose 5%. 1000 milliLiter(s) IV Continuous <Continuous>  dextrose 50% Injectable 12.5 Gram(s) IV Push once  dextrose 50% Injectable 25 Gram(s) IV Push once  dextrose 50% Injectable 25 Gram(s) IV Push once  dextrose Gel 1 Dose(s) Oral once PRN  glucagon  Injectable 1 milliGRAM(s) IntraMuscular once PRN  heparin  Injectable 5000 Unit(s) SubCutaneous every 8 hours  influenza   Vaccine 0.5 milliLiter(s) IntraMuscular once  insulin lispro (HumaLOG) corrective regimen sliding scale   SubCutaneous three times a day before meals  labetalol 100 milliGRAM(s) Oral three times a day  lactobacillus acidophilus 1 Tablet(s) Oral daily  NIFEdipine XL 30 milliGRAM(s) Oral daily  piperacillin/tazobactam IVPB. 3.375 Gram(s) IV Intermittent every 12 hours  silver sulfADIAZINE 1% Cream 1 Application(s) Topical daily  sodium bicarbonate 650 milliGRAM(s) Oral two times a day  vancomycin  IVPB 1000 milliGRAM(s) IV Intermittent once      Vital Signs Last 24 Hrs  T(C): 37.4 (15 Feb 2018 05:39), Max: 37.4 (15 Feb 2018 05:39)  T(F): 99.3 (15 Feb 2018 05:39), Max: 99.3 (15 Feb 2018 05:39)  HR: 87 (15 Feb 2018 05:39) (73 - 88)  BP: 150/67 (15 Feb 2018 05:39) (137/58 - 150/67)  BP(mean): --  RR: 18 (15 Feb 2018 05:39) (18 - 20)  SpO2: 94% (15 Feb 2018 05:39) (92% - 94%)  I&O's Summary      Physical Exam:  Neuro  A&Ox3 VSS  Vascular:  left toe 1 wound stable dressing c/d/i    LABS:                        7.8    7.83  )-----------( 187      ( 15 Feb 2018 07:20 )             24.7     02-15    145  |  111<H>  |  58<H>  ----------------------------<  85  5.0   |  17<L>  |  4.11<H>    Ca    8.4      15 Feb 2018 07:20    TPro  7.0  /  Alb  3.3  /  TBili  0.3  /  DBili  x   /  AST  22  /  ALT  25  /  AlkPhos  90  02-15        BARI NICOLAS MD  [phone]

## 2018-02-15 NOTE — PROGRESS NOTE ADULT - PROBLEM SELECTOR PLAN 3
-Started on home medications.  -Not well controlled. Patient was supposed to be taking Labetalol 100mgTID at home, but said he was taking BID. Starting on TID today. -With fever and RLL findings on PE. Satted down to 88% and improved with 2L O2, but no subjective SOB. Concerning for PNA vs URI. RVP sent. CXR ordered. Repeat labs and BCx ordered.

## 2018-02-15 NOTE — PHYSICAL THERAPY INITIAL EVALUATION ADULT - MANUAL MUSCLE TESTING RESULTS, REHAB EVAL
Bilateral UE muscle strength grossly 3/5 throughout. Left hip muscle strength 3-/5 throughout. Remainder of Left LE and R LE muscle strength grossly 3/5 throughout

## 2018-02-15 NOTE — PHYSICAL THERAPY INITIAL EVALUATION ADULT - ADDITIONAL COMMENTS
as per Pt., he lives in a pvt home with their siblings. Pt. has 4 steps with HR x 1 to reach bedroom/bathroom. Pt. was previously ambulating short household distances with a rolling walker with Right BKA however, he reports that he primarily uses his wheelchair. Pt. was previously independent with ADLs however, they would benefit from occasional assistance from HHA (7x/week from 9-1pm). Pt. returned to bed at end of therapy session with all lines and tubes intact, call bell in reach and in NAD.

## 2018-02-15 NOTE — PROGRESS NOTE ADULT - ASSESSMENT
63M long standing DM  (A1C was 5.9), HTN, CKD, R BKA in 2009 presents with 3 day history of worsening foot pain.  Noted to have extensive blistering of the plantar foot which was deroofed and swabbed.   So far staph aureus.  unclear if this is colonization as wound does not appear grossly infected or true infection, however, in light of the significant pain he had on presentation, will treat as soft tissue infection.  Short course of antibiotics for possible superinfection.  Otherwise, clinically stable.  Awaiting vascular work up.  Angio next week per vascular    Suggest:  - vancomycin/zosyn pending cultures  - vancomycin by level - agree with x1 dose today  - f/u sensitivity of SA

## 2018-02-15 NOTE — OCCUPATIONAL THERAPY INITIAL EVALUATION ADULT - MD ORDER
Occupational Therapy to evaluate and treat. Occupational Therapy to evaluate and treat.  Ambulate as tolerated.

## 2018-02-15 NOTE — PROGRESS NOTE ADULT - PROBLEM SELECTOR PLAN 1
-L foot with cellulitis.   -ID has seen patient, recs appreciated.  -Vancomycin by level. Continuing zosyn.   -Podiatry has evaluated patient and report good improvement. recs appreciated.   -Surgery has been consulted and are following. Want an angiogram. Vascular attending will speak to renal attending today, but per renal, we should wait until creatinine stabilizes.  -Wound cultures growing gram positive cocci in pairs. Awaiting speciation and sensitivities. BLood cx with NGTD.  -LLE xrays negative for free air; CT read negative for free air. -L foot with cellulitis.   -ID has seen patient, recs appreciated.  -Vancomycin by level. Continuing zosyn.   -Podiatry has evaluated patient and report good improvement. recs appreciated.   -Surgery has been consulted and are following. Want an angiogram; will obtain once creatinine stabilizes  -Wound cultures growing gram positive cocci in pairs. Awaiting speciation and sensitivities. BLood cx with NGTD.  -LLE xrays negative for free air; CT read negative for free air.

## 2018-02-15 NOTE — PHYSICAL THERAPY INITIAL EVALUATION ADULT - PERTINENT HX OF CURRENT PROBLEM, REHAB EVAL
Pt. is a 63 year old male admitted to [facility] secondary to cellulitis. PMH: Right BKA, CKD, HTN

## 2018-02-15 NOTE — PHYSICAL THERAPY INITIAL EVALUATION ADULT - PATIENT PROFILE REVIEW, REHAB EVAL
Pt. profile reviewed, consulted with RN Yeyo LOPEZ prior to initial PT evaluation and tx, as per RN, Pt. is OK to participate in skilled therapy session, current activity orders; ambulate as tolerated./yes

## 2018-02-15 NOTE — PROGRESS NOTE ADULT - SUBJECTIVE AND OBJECTIVE BOX
Patient is a 63y old  Male who presents with a chief complaint of 63M PMH DM, HTN, CKD p/w L foot pain x 3 days. (12 Feb 2018 19:33)    f/u: possible foot infection and (+) cx    interval history/ROS:  minimal foot pain.  no n/v/d.  no rash else where.  no fever/chills.  Rest of ROS otherwise negative.    Allergies  No Known Allergies    ANTIMICROBIALS:    piperacillin/tazobactam IVPB. 3.375 every 12 hours  vanc x1 2/12 and 2/13; ordered for today.    MEDICATIONS  (STANDING):  atorvastatin 40 at bedtime  heparin  Injectable 5000 every 8 hours  influenza   Vaccine 0.5 once  insulin lispro (HumaLOG) corrective regimen sliding scale  three times a day before meals  labetalol 100 three times a day  NIFEdipine XL 30 daily    Vital Signs Last 24 Hrs  T(F): 99.3 (02-15-18 @ 05:39), Max: 99.3 (02-15-18 @ 05:39)  HR: 87 (02-15-18 @ 05:39)  BP: 150/67 (02-15-18 @ 05:39)  RR: 18 (02-15-18 @ 05:39)  SpO2: 94% (02-15-18 @ 05:39) (92% - 94%)  Wt(kg): --    PHYSICAL EXAM:  General: non-toxic  HEAD/EYES: anicteric  ENT:  supple; no thrush  Cardiovascular:   S1, S2, no murmur  Respiratory:  clear bilaterally  GI:  soft, non-tender, normal bowel sounds  :  no CVA tenderness   Musculoskeletal:  no synovitis  Neurologic:  grossly non-focal  Skin:  left dorsal foot with open wound; no pus; no malodor; no evidence of cellulitis  Lymph: no lymphadenopathy  Psychiatric:  appropriate affect  Vascular:  no phlebitis, nonpalpable pedal pulses                        7.8    7.83  )-----------( 187      ( 15 Feb 2018 07:20 )             24.7 02-15    145  |  111  |  58  ----------------------------<  85  5.0   |  17  |  4.11  Ca    8.4      15 Feb 2018 07:20  TPro  7.0  /  Alb  3.3  /  TBili  0.3  /  DBili  x   /  AST  22  /  ALT  25  /  AlkPhos  90  02-15    CRP-110  ESR 96    Vancomycin Level, Random: 14.3 ug/mL (02.15.18 @ 07:20)    MICROBIOLOGY:  Culture - Abscess with Gram Stain (02.12.18 @ 18:45)    Specimen Source: OTHER    Gram Stain Result:   GPCPR^Gram Pos Cocci in Pairs  QUANTITY OF BACTERIA SEEN: FEW (2+)  WBC^White Blood Cells  QNTY CELLS IN GRAM STAIN: RARE (1+)    Culture Results:   MANY  STAU^Staphylococcus aureus  GPCID^Gram Pos Cocci to be IDed  GNRID^Gram Neg Nabeel To Be Identified  JOSE^Corynebacterium species    Culture - Blood (02.12.18 @ 17:32)    Culture - Blood:   NO ORGANISMS ISOLATED  NO ORGANISMS ISOLATED AT 24 HOURS    Specimen Source: BLOOD VENOUS    Culture - Blood (02.12.18 @ 17:12)    Culture - Blood:   NO ORGANISMS ISOLATED  NO ORGANISMS ISOLATED AT 24 HOURS    Specimen Source: BLOOD    Vancomycin Level, Trough: 12.0 ug/mL (02-13-18 @ 07:11)    RADIOLOGY:  CT Lower Extremity No Cont, Left (02.12.18 @ 20:04) >  IMPRESSION:  diffuse circumscribed soft tissue swelling involving distal 2/3-1/2 of the left calf compatible with edema and/or cellulitis. No tracking gas collections or suspected focal drainable abscess collections despite lack of IV contrast.    No deep soft tissue or articular involvement. No CT evidence for   osteomyelitis.      Xray Foot AP + Lateral + Oblique, Left (02.12.18 @ 19:48)  IMPRESSION:  No acute displaced fracture. No radiographic evidence of osteomyelitis.

## 2018-02-15 NOTE — PROGRESS NOTE ADULT - PROBLEM SELECTOR PLAN 4
-Pt on oral medications at home.   -Start on sliding scale.  -Hemoglobin a1c: 5.9%  -Diabetic precautions -Started on home medications.  -Not well controlled. Patient was supposed to be taking Labetalol 100mgTID at home, but said he was taking BID. Starting on TID today.

## 2018-02-15 NOTE — PROGRESS NOTE ADULT - PROBLEM SELECTOR PROBLEM 5
Prophylactic measure Type 2 diabetes mellitus with complication, without long-term current use of insulin

## 2018-02-15 NOTE — PHYSICAL THERAPY INITIAL EVALUATION ADULT - CRITERIA FOR SKILLED THERAPEUTIC INTERVENTIONS
anticipated D/C to rehab facility to address current functional limitation to optimize safety to allow pt. to return to their prior level of function./impairments found/functional limitations in following categories/rehab potential/anticipated equipment needs at discharge/anticipated discharge recommendation

## 2018-02-15 NOTE — PHYSICAL THERAPY INITIAL EVALUATION ADULT - RANGE OF MOTION EXAMINATION, REHAB EVAL
Left hip acitve ROM 0-90 degrees, Passive ROM WFL, Left knee and ankle ROM WFL, Right hip and knee ROM WFL. Unable to assess right ankle ROM secondary to BKA.

## 2018-02-15 NOTE — PROGRESS NOTE ADULT - SUBJECTIVE AND OBJECTIVE BOX
Patient is a 63y old  Male who presents with a chief complaint of 63M PMH DM, HTN, CKD p/w L foot pain x 3 days. (12 Feb 2018 19:33)       INTERVAL HPI/OVERNIGHT EVENTS:  Patient seen and evaluated at bedside.  Pt is resting comfortable in NAD. Denies N/V/F/C.  Pain rated at X/10    Allergies    No Known Allergies    Intolerances        Vital Signs Last 24 Hrs  T(C): 37.4 (15 Feb 2018 05:39), Max: 37.4 (15 Feb 2018 05:39)  T(F): 99.3 (15 Feb 2018 05:39), Max: 99.3 (15 Feb 2018 05:39)  HR: 87 (15 Feb 2018 05:39) (73 - 88)  BP: 150/67 (15 Feb 2018 05:39) (137/58 - 150/67)  BP(mean): --  RR: 18 (15 Feb 2018 05:39) (18 - 20)  SpO2: 94% (15 Feb 2018 05:39) (92% - 94%)    LABS:                        7.8    7.83  )-----------( 187      ( 15 Feb 2018 07:20 )             24.7     02-15    145  |  111<H>  |  58<H>  ----------------------------<  85  5.0   |  17<L>  |  4.11<H>    Ca    8.4      15 Feb 2018 07:20    TPro  7.0  /  Alb  3.3  /  TBili  0.3  /  DBili  x   /  AST  22  /  ALT  25  /  AlkPhos  90  02-15        CAPILLARY BLOOD GLUCOSE      POCT Blood Glucose.: 87 mg/dL (15 Feb 2018 08:20)  POCT Blood Glucose.: 190 mg/dL (14 Feb 2018 21:36)  POCT Blood Glucose.: 99 mg/dL (14 Feb 2018 17:32)  POCT Blood Glucose.: 113 mg/dL (14 Feb 2018 12:13)      Lower Extremity Physical Exam:  Vasular: DP/PT 0/4, B/L, CFT <2 seconds B/L, Temperature gradient warm, B/L.   Neuro: Epicritic sensation absent to the level of toes, B/L.  Musculoskeletal/Ortho:  Skin:  Wound #1:   Location: Left foot deroofed blister with cherry red non-blanchable trophic changes to plantar aspect of toes 1-5, forefoot, midfoot, no ascending erythema or swelling, no malodor, no purulence, no deep probe, etiology unknown, ROM of toes intact, CFT to each toes normal, no sinus tract, no undermining.    RADIOLOGY & ADDITIONAL TESTS:  < from: Xray Foot AP + Lateral + Oblique, Left (02.12.18 @ 19:48) >    EXAM:  RAD FOOT MIN 3 VIEWS LT      EXAM:  RAD TIB-FIB LT      EXAM:  RAD ANKLE MIN 3 VIEWS LEFT        PROCEDURE DATE:  Feb 12 2018         INTERPRETATION:  CLINICAL INFORMATION: Left foot pain, evaluate for   osteomyelitis or subcutaneous emphysema.    EXAM: 3 views of the left foot, 3 views of the left ankle, and 2 views of   the left tibia-fibula with no prior comparisons.    FINDINGS:  No acute displaced fracture.    No subcutaneous air is seen, however necrotizing fasciitis can present in  the absence of subcutaneous air. No focal osteopenia, cortical erosion or   periosteal reaction.    No ankle or knee joint effusion.    IMPRESSION:  No acute displaced fracture. No radiographic evidence of osteomyelitis.              HARDIK RODGERS M.D., RADIOLOGY RESIDENT  This document has been electronically signed.  CHRISTOPH MERCEDES M.D.; ATTENDING RADIOLOGIST  This document has been electronically signed. Feb 13 2018 10:46AM        < end of copied text >

## 2018-02-15 NOTE — OCCUPATIONAL THERAPY INITIAL EVALUATION ADULT - PERTINENT HX OF CURRENT PROBLEM, REHAB EVAL
63M PMH DM, FS 80s at home, HTN, CKD, R BKA in 2009 presents with 3 day history of worsening foot pain. Patient reports that 3 days ago he started noticing a blister forming on the bottom of his left foot. The blister popped and patient had bloody fluid come out and felt extreme pain.

## 2018-02-15 NOTE — PROGRESS NOTE ADULT - ASSESSMENT
64yo M with DM and R BKA, now presenting with Diabetic foot soft tissue infection. No evidence of necrotizing fasciitis or systemic infection.     - Wound care as per podiatry  -pt  will require  lle angio tent rescheduled for tue to allow optimazation by renal  above d/w pt   will follow

## 2018-02-15 NOTE — PROGRESS NOTE ADULT - ATTENDING COMMENTS
Patient seen and examined.  Case discussed with house staff.  Agree with above as edited.  Patient with left foot infection with severe sepsis and lactic acidosis with JANY on CKD III.  Vascular, podiatry, nephrology and infectious disease input appreciated.  CT does not show any abscess, nec fasc or evidence of acute osteomyelitis.  d/c clinda. Patient on IV zosyn and IV vanco by level, received dose today.   Cx showing MSSA and pan sensitive acinetobacter. Sensitivities on corynebacterium pending.  Now with new fever. Repeat BCx. Check RVP and CXR - RLL > LLL rales on exam.  JANY on CKD III - suspect an ATN from underlying sepsis - d/w renal. trend Cr  Appreciate vasc recs - will defer angio until Cr stable - d/w renal.

## 2018-02-16 LAB
-  AMIKACIN: SIGNIFICANT CHANGE UP
-  AMPICILLIN/SULBACTAM: SIGNIFICANT CHANGE UP
-  CEFAZOLIN: SIGNIFICANT CHANGE UP
-  CEFAZOLIN: SIGNIFICANT CHANGE UP
-  CEFEPIME: SIGNIFICANT CHANGE UP
-  CEFTAZIDIME: SIGNIFICANT CHANGE UP
-  CIPROFLOXACIN: SIGNIFICANT CHANGE UP
-  CLINDAMYCIN: SIGNIFICANT CHANGE UP
-  CLINDAMYCIN: SIGNIFICANT CHANGE UP
-  ERYTHROMYCIN: SIGNIFICANT CHANGE UP
-  ERYTHROMYCIN: SIGNIFICANT CHANGE UP
-  GENTAMICIN: SIGNIFICANT CHANGE UP
-  LEVOFLOXACIN: SIGNIFICANT CHANGE UP
-  MEROPENEM: SIGNIFICANT CHANGE UP
-  MOXIFLOXACIN(AEROBIC): SIGNIFICANT CHANGE UP
-  MOXIFLOXACIN(AEROBIC): SIGNIFICANT CHANGE UP
-  OXACILLIN: SIGNIFICANT CHANGE UP
-  OXACILLIN: SIGNIFICANT CHANGE UP
-  PENICILLIN: SIGNIFICANT CHANGE UP
-  PENICILLIN: SIGNIFICANT CHANGE UP
-  RIFAMPIN.: SIGNIFICANT CHANGE UP
-  RIFAMPIN.: SIGNIFICANT CHANGE UP
-  TETRACYCLINE: SIGNIFICANT CHANGE UP
-  TETRACYCLINE: SIGNIFICANT CHANGE UP
-  TOBRAMYCIN: SIGNIFICANT CHANGE UP
-  TRIMETHOPRIM/SULFAMETHOXAZOLE: SIGNIFICANT CHANGE UP
-  TRIMETHOPRIM/SULFAMETHOXAZOLE: SIGNIFICANT CHANGE UP
-  VANCOMYCIN: SIGNIFICANT CHANGE UP
-  VANCOMYCIN: SIGNIFICANT CHANGE UP
ALBUMIN SERPL ELPH-MCNC: 3 G/DL — LOW (ref 3.3–5)
ALP SERPL-CCNC: 86 U/L — SIGNIFICANT CHANGE UP (ref 40–120)
ALT FLD-CCNC: 23 U/L — SIGNIFICANT CHANGE UP (ref 4–41)
AST SERPL-CCNC: 20 U/L — SIGNIFICANT CHANGE UP (ref 4–40)
BASE EXCESS BLDV CALC-SCNC: -6.5 MMOL/L — SIGNIFICANT CHANGE UP
BASOPHILS # BLD AUTO: 0.05 K/UL — SIGNIFICANT CHANGE UP (ref 0–0.2)
BASOPHILS NFR BLD AUTO: 0.7 % — SIGNIFICANT CHANGE UP (ref 0–2)
BILIRUB SERPL-MCNC: 0.3 MG/DL — SIGNIFICANT CHANGE UP (ref 0.2–1.2)
BUN SERPL-MCNC: 59 MG/DL — HIGH (ref 7–23)
CALCIUM SERPL-MCNC: 8.2 MG/DL — LOW (ref 8.4–10.5)
CHLORIDE SERPL-SCNC: 110 MMOL/L — HIGH (ref 98–107)
CO2 SERPL-SCNC: 16 MMOL/L — LOW (ref 22–31)
CREAT SERPL-MCNC: 4.56 MG/DL — HIGH (ref 0.5–1.3)
CULTURE RESULTS: SIGNIFICANT CHANGE UP
EOSINOPHIL # BLD AUTO: 0.19 K/UL — SIGNIFICANT CHANGE UP (ref 0–0.5)
EOSINOPHIL NFR BLD AUTO: 2.5 % — SIGNIFICANT CHANGE UP (ref 0–6)
GAS PNL BLDV: 141 MMOL/L — SIGNIFICANT CHANGE UP (ref 136–146)
GLUCOSE BLDC GLUCOMTR-MCNC: 136 MG/DL — HIGH (ref 70–99)
GLUCOSE BLDC GLUCOMTR-MCNC: 155 MG/DL — HIGH (ref 70–99)
GLUCOSE BLDC GLUCOMTR-MCNC: 178 MG/DL — HIGH (ref 70–99)
GLUCOSE BLDC GLUCOMTR-MCNC: 95 MG/DL — SIGNIFICANT CHANGE UP (ref 70–99)
GLUCOSE BLDV-MCNC: 96 — SIGNIFICANT CHANGE UP (ref 70–99)
GLUCOSE SERPL-MCNC: 90 MG/DL — SIGNIFICANT CHANGE UP (ref 70–99)
HCO3 BLDV-SCNC: 19 MMOL/L — LOW (ref 20–27)
HCT VFR BLD CALC: 23.4 % — LOW (ref 39–50)
HCT VFR BLDV CALC: 22.1 % — LOW (ref 39–51)
HGB BLD-MCNC: 7.6 G/DL — LOW (ref 13–17)
HGB BLDV-MCNC: 7.1 G/DL — LOW (ref 13–17)
IMM GRANULOCYTES # BLD AUTO: 0.07 # — SIGNIFICANT CHANGE UP
IMM GRANULOCYTES NFR BLD AUTO: 0.9 % — SIGNIFICANT CHANGE UP (ref 0–1.5)
LYMPHOCYTES # BLD AUTO: 1.18 K/UL — SIGNIFICANT CHANGE UP (ref 1–3.3)
LYMPHOCYTES # BLD AUTO: 15.8 % — SIGNIFICANT CHANGE UP (ref 13–44)
MCHC RBC-ENTMCNC: 28.5 PG — SIGNIFICANT CHANGE UP (ref 27–34)
MCHC RBC-ENTMCNC: 32.5 % — SIGNIFICANT CHANGE UP (ref 32–36)
MCV RBC AUTO: 87.6 FL — SIGNIFICANT CHANGE UP (ref 80–100)
METHOD TYPE: SIGNIFICANT CHANGE UP
MONOCYTES # BLD AUTO: 1.19 K/UL — HIGH (ref 0–0.9)
MONOCYTES NFR BLD AUTO: 15.9 % — HIGH (ref 2–14)
NEUTROPHILS # BLD AUTO: 4.8 K/UL — SIGNIFICANT CHANGE UP (ref 1.8–7.4)
NEUTROPHILS NFR BLD AUTO: 64.2 % — SIGNIFICANT CHANGE UP (ref 43–77)
NRBC # FLD: 0 — SIGNIFICANT CHANGE UP
ORGANISM # SPEC MICROSCOPIC CNT: SIGNIFICANT CHANGE UP
ORGANISM # SPEC MICROSCOPIC CNT: SIGNIFICANT CHANGE UP
PCO2 BLDV: 36 MMHG — LOW (ref 41–51)
PH BLDV: 7.33 PH — SIGNIFICANT CHANGE UP (ref 7.32–7.43)
PLATELET # BLD AUTO: 190 K/UL — SIGNIFICANT CHANGE UP (ref 150–400)
PMV BLD: 11.7 FL — SIGNIFICANT CHANGE UP (ref 7–13)
PO2 BLDV: 48 MMHG — HIGH (ref 35–40)
POTASSIUM BLDV-SCNC: 4.4 MMOL/L — SIGNIFICANT CHANGE UP (ref 3.4–4.5)
POTASSIUM SERPL-MCNC: 4.7 MMOL/L — SIGNIFICANT CHANGE UP (ref 3.5–5.3)
POTASSIUM SERPL-SCNC: 4.7 MMOL/L — SIGNIFICANT CHANGE UP (ref 3.5–5.3)
PROT SERPL-MCNC: 6.7 G/DL — SIGNIFICANT CHANGE UP (ref 6–8.3)
RBC # BLD: 2.67 M/UL — LOW (ref 4.2–5.8)
RBC # FLD: 14.3 % — SIGNIFICANT CHANGE UP (ref 10.3–14.5)
SAO2 % BLDV: 78.8 % — SIGNIFICANT CHANGE UP (ref 60–85)
SODIUM SERPL-SCNC: 143 MMOL/L — SIGNIFICANT CHANGE UP (ref 135–145)
SPECIMEN SOURCE: SIGNIFICANT CHANGE UP
SPECIMEN SOURCE: SIGNIFICANT CHANGE UP
VANCOMYCIN FLD-MCNC: 21.8 UG/ML — SIGNIFICANT CHANGE UP
WBC # BLD: 7.48 K/UL — SIGNIFICANT CHANGE UP (ref 3.8–10.5)
WBC # FLD AUTO: 7.48 K/UL — SIGNIFICANT CHANGE UP (ref 3.8–10.5)

## 2018-02-16 PROCEDURE — 99233 SBSQ HOSP IP/OBS HIGH 50: CPT | Mod: GC

## 2018-02-16 PROCEDURE — 99232 SBSQ HOSP IP/OBS MODERATE 35: CPT

## 2018-02-16 RX ORDER — AMPICILLIN SODIUM AND SULBACTAM SODIUM 250; 125 MG/ML; MG/ML
1.5 INJECTION, POWDER, FOR SUSPENSION INTRAMUSCULAR; INTRAVENOUS EVERY 12 HOURS
Refills: 0 | Status: DISCONTINUED | OUTPATIENT
Start: 2018-02-16 | End: 2018-02-23

## 2018-02-16 RX ADMIN — Medication 100 MILLIGRAM(S): at 06:34

## 2018-02-16 RX ADMIN — AMPICILLIN SODIUM AND SULBACTAM SODIUM 100 GRAM(S): 250; 125 INJECTION, POWDER, FOR SUSPENSION INTRAMUSCULAR; INTRAVENOUS at 17:25

## 2018-02-16 RX ADMIN — Medication 1 TABLET(S): at 11:57

## 2018-02-16 RX ADMIN — HEPARIN SODIUM 5000 UNIT(S): 5000 INJECTION INTRAVENOUS; SUBCUTANEOUS at 06:35

## 2018-02-16 RX ADMIN — HEPARIN SODIUM 5000 UNIT(S): 5000 INJECTION INTRAVENOUS; SUBCUTANEOUS at 14:19

## 2018-02-16 RX ADMIN — HEPARIN SODIUM 5000 UNIT(S): 5000 INJECTION INTRAVENOUS; SUBCUTANEOUS at 22:48

## 2018-02-16 RX ADMIN — Medication 30 MILLIGRAM(S): at 06:34

## 2018-02-16 RX ADMIN — Medication 1 APPLICATION(S): at 12:45

## 2018-02-16 RX ADMIN — Medication 100 MILLIGRAM(S): at 22:42

## 2018-02-16 RX ADMIN — ATORVASTATIN CALCIUM 40 MILLIGRAM(S): 80 TABLET, FILM COATED ORAL at 22:42

## 2018-02-16 RX ADMIN — Medication 650 MILLIGRAM(S): at 17:25

## 2018-02-16 RX ADMIN — PREGABALIN 1000 MICROGRAM(S): 225 CAPSULE ORAL at 11:57

## 2018-02-16 RX ADMIN — Medication 650 MILLIGRAM(S): at 06:34

## 2018-02-16 RX ADMIN — PIPERACILLIN AND TAZOBACTAM 25 GRAM(S): 4; .5 INJECTION, POWDER, LYOPHILIZED, FOR SOLUTION INTRAVENOUS at 06:36

## 2018-02-16 RX ADMIN — Medication 1: at 12:44

## 2018-02-16 RX ADMIN — Medication 100 MILLIGRAM(S): at 14:19

## 2018-02-16 NOTE — PROGRESS NOTE ADULT - ASSESSMENT
64yo M with DM and R BKA, now presenting with Diabetic foot soft tissue infection. No evidence of necrotizing fasciitis or systemic infection.     - Wound care as per podiatry  -pt  will require  lle angio tent rescheduled for tue to allow optimazation by renal  await renal clearance for lle angio  will follow

## 2018-02-16 NOTE — PROGRESS NOTE ADULT - SUBJECTIVE AND OBJECTIVE BOX
Patient is a 63y old  Male who presents with a chief complaint of 63M PMH DM, HTN, CKD p/w L foot pain x 3 days. (12 Feb 2018 19:33)    f/u: possible foot infection and (+) cx    interval history/ROS:  able to ambulate on the foot.  no foot pain.  no n/v/d.  no fever.  Rest of ROS otherwise negative.    Allergies  No Known Allergies    ANTIMICROBIALS:    piperacillin/tazobactam IVPB. 3.375 every 12 hours  vanc x1 2/12 and 2/13; 2/15.    MEDICATIONS  (STANDING):  atorvastatin 40 at bedtime  heparin  Injectable 5000 every 8 hours  influenza   Vaccine 0.5 once  insulin lispro (HumaLOG) corrective regimen sliding scale  three times a day before meals  labetalol 100 three times a day  NIFEdipine XL 30 daily    Vital Signs Last 24 Hrs  T(F): 98.1 (02-16-18 @ 10:09), Max: 98.3 (02-15-18 @ 21:30)  HR: 79 (02-16-18 @ 10:09)  BP: 142/65 (02-16-18 @ 10:09)  RR: 18 (02-16-18 @ 10:09)  SpO2: 97% (02-16-18 @ 10:09) (93% - 100%)  Wt(kg): --    PHYSICAL EXAM:  General: non-toxic  HEAD/EYES: anicteric  ENT:  supple; no thrush  Cardiovascular:   S1, S2, no murmur  Respiratory:  clear bilaterally  GI:  soft, non-tender, normal bowel sounds  :  no CVA tenderness   Musculoskeletal:  no synovitis  Neurologic:  grossly non-focal  Skin:  R BKA; left dorsal foot with open wound; no pus; no malodor; no evidence of cellulitis  Psychiatric:  appropriate affect though somewhat blunted  Vascular:  no phlebitis, nonpalpable pedal pulses                                 7.6    7.48  )-----------( 190      ( 16 Feb 2018 06:30 )             23.4 02-16    143  |  110  |  59  ----------------------------<  90  4.7   |  16  |  4.56  Ca    8.2      16 Feb 2018 06:30  TPro  6.7  /  Alb  3.0  /  TBili  0.3  /  DBili  x   /  AST  20  /  ALT  23  /  AlkPhos  86  02-16    Sedimentation Rate, Erythrocyte: 96 mm/hr (02.12.18 @ 15:42)  C-Reactive Protein, Serum: 110.3 mg/L (02.12.18 @ 15:30)    Vancomycin Level, Random: 21.8 ug/mL (02.16.18 @ 12:57)    MICROBIOLOGY:  Culture - Abscess with Gram Stain (02.12.18 @ 18:45)    Organism: Staphylococcus aureus (mssa)    Organism: Corynebacterium species    Organism: Acinetobacter baumannii (S-amp/sul)    Organism: Staphylococcus haemolyticus    Culture - Blood (02.12.18 @ 17:32)    Culture - Blood:   NO ORGANISMS ISOLATED  NO ORGANISMS ISOLATED AT 72 HRS.    Specimen Source: BLOOD VENOUS    Culture - Blood (02.12.18 @ 17:12)    Culture - Blood:   NO ORGANISMS ISOLATED  NO ORGANISMS ISOLATED AT 72 HRS.    Specimen Source: BLOOD    Vancomycin Level, Trough: 12.0 ug/mL (02-13-18 @ 07:11)    RADIOLOGY:  CT Lower Extremity No Cont, Left (02.12.18 @ 20:04) >  IMPRESSION:  diffuse circumscribed soft tissue swelling involving distal 2/3-1/2 of the left calf compatible with edema and/or cellulitis. No tracking gas collections or suspected focal drainable abscess collections despite lack of IV contrast.  No deep soft tissue or articular involvement. No CT evidence for osteomyelitis.    Xray Foot AP + Lateral + Oblique, Left (02.12.18 @ 19:48)  IMPRESSION:  No acute displaced fracture. No radiographic evidence of osteomyelitis.

## 2018-02-16 NOTE — PROGRESS NOTE ADULT - SUBJECTIVE AND OBJECTIVE BOX
Patient is a 63y old  Male who presents with a chief complaint of 63M PMH DM, HTN, CKD p/w L foot pain x 3 days. (12 Feb 2018 19:33)       INTERVAL HPI/OVERNIGHT EVENTS:  Patient seen and evaluated at bedside.  Pt is resting comfortable in NAD. Denies N/V/F/C.      Allergies    No Known Allergies    Intolerances        Vital Signs Last 24 Hrs  T(C): 36.7 (16 Feb 2018 10:09), Max: 37.2 (15 Feb 2018 14:00)  T(F): 98.1 (16 Feb 2018 10:09), Max: 99 (15 Feb 2018 14:00)  HR: 79 (16 Feb 2018 10:09) (75 - 89)  BP: 142/65 (16 Feb 2018 10:09) (136/49 - 153/67)  BP(mean): --  RR: 18 (16 Feb 2018 10:09) (17 - 18)  SpO2: 97% (16 Feb 2018 10:09) (93% - 100%)    LABS:                        7.6    7.48  )-----------( 190      ( 16 Feb 2018 06:30 )             23.4     02-16    143  |  110<H>  |  59<H>  ----------------------------<  90  4.7   |  16<L>  |  4.56<H>    Ca    8.2<L>      16 Feb 2018 06:30    TPro  6.7  /  Alb  3.0<L>  /  TBili  0.3  /  DBili  x   /  AST  20  /  ALT  23  /  AlkPhos  86  02-16        CAPILLARY BLOOD GLUCOSE      POCT Blood Glucose.: 155 mg/dL (16 Feb 2018 11:55)  POCT Blood Glucose.: 95 mg/dL (16 Feb 2018 08:47)  POCT Blood Glucose.: 202 mg/dL (15 Feb 2018 22:16)  POCT Blood Glucose.: 154 mg/dL (15 Feb 2018 17:54)      Lower Extremity Physical Exam:  Vasular: DP/PT 0/4, B/L, CFT <2 seconds B/L, Temperature gradient warm, B/L.   Neuro: Epicritic sensation absent to the level of toes, B/L.  Musculoskeletal/Ortho:  Skin:  Wound #1:   Location: Left foot deroofed blister with cherry red non-blanchable trophic changes to plantar aspect of toes 1-5, forefoot, midfoot, no ascending erythema or swelling, no malodor, no purulence, no deep probe, etiology unknown, ROM of toes intact, CFT to each toes normal, no sinus tract, no undermining.

## 2018-02-16 NOTE — PROGRESS NOTE ADULT - PROBLEM SELECTOR PLAN 1
Pt. with JANY/ATN on CKD. CKD in the setting of long-standing HTN/DM. JANY likely hemodynamically mediated in the setting of infection. As per review on labs from [place] Scr was 1.5 in 3/2017. As her pt. PMD office, last Scr checked in 7/2017 was 2.30. Scr on admission (2/12) was elevated at 3.81 and increased to 4.56 today.   -ATN with superimposed pre-renal in the setting of sepsis, renal US with no abnormalities, granular casts seen on microscopy. Pt. with JANY/ATN on CKD. CKD in the setting of long-standing HTN/DM. JANY likely hemodynamically mediated in the setting of infection. As per review on labs from [place] Scr was 1.5 in 3/2017. As her pt. PMD office, last Scr checked in 7/2017 was 2.30. Scr on admission (2/12) was elevated at 3.81 and increased to 4.56 today. Pt. non-oliguric. Renal sonogram showed no evidence of obstruction/hydronephrosis. Monitor BMP, urine output, I/Os. Avoid RCA, NSAIDs, nephrotoxins

## 2018-02-16 NOTE — PROGRESS NOTE ADULT - ASSESSMENT
64 y/o male with PMHx of HTN, DM, R BKA, CKD admitted for foot infection on IV ABX and was found to have JANY.

## 2018-02-16 NOTE — PROGRESS NOTE ADULT - PROBLEM SELECTOR PLAN 2
-JANY on evolving ATN on CKD stage III  -Renal consult appreciated. Recommend renal ultrasound -no findings. Spun down urine demonstrating granular casts suggesting ATN. Baseline creatinine is 2.3.   -Awaiting creatinine stabilization  -Avoiding nephrotoxins and RCA.

## 2018-02-16 NOTE — PROGRESS NOTE ADULT - ASSESSMENT
63M PMH DM on januvia, FS 80s at home, HTN, CKD, R BKA in 2009 presents with 3 day history of worsening foot pain concerning for cellulitis vs osteo, currently stable but spiked fever yesterday.

## 2018-02-16 NOTE — PROGRESS NOTE ADULT - PROBLEM SELECTOR PLAN 3
-Improved today. RVP negative. Satting well on room air.  -CXR concerning for fluid overload. Bedside ultrasound confirming.

## 2018-02-16 NOTE — PROGRESS NOTE ADULT - SUBJECTIVE AND OBJECTIVE BOX
Progress Note    JAMES PATRICK 63y (1955) Male [id number]  02-12-18 (4d)    Dr. Galvez [facility] PGY1  Pager# [phone]    Subjective:  No acute events overnight. Patient seen and examined at bedside. Patient reports cough is improved. Denies SOB, nausea, vomiting, abdominal pain. Not feeling chills. Has been afebrile since yesterday.    CONSTITUTIONAL: No fever, weight loss, or fatigue  EYES: No eye pain  ENMT: No sinus or throat pain  NECK: No pain or stiffness  BREASTS: No pain, masses, or nipple discharge  RESPIRATORY: Mild cough; No shortness of breath  CARDIOVASCULAR: No chest pain, palpitations, dizziness, or leg swelling  GASTROINTESTINAL: No abdominal or epigastric pain. No nausea, vomiting, or hematemesis; No diarrhea or constipation. No melena or hematochezia.  GENITOURINARY: No dysuria, frequency, hematuria, or incontinence  NEUROLOGICAL: No headaches, memory loss, loss of strength, numbness, or tremors  SKIN: No itching, burning, rashes, or lesions   LYMPH NODES: No enlarged glands  ENDOCRINE: No heat or cold intolerance; No hair loss  MUSCULOSKELETAL: No joint pain or swelling; No muscle, back, or extremity pain  PSYCHIATRIC: No depression, anxiety, mood swings, or difficulty sleeping  HEME/LYMPH: No easy bruising, or bleeding gums  ALLERY AND IMMUNOLOGIC: No hives or eczema  PAST MEDICAL & SURGICAL HISTORY:  Kidney disease (N28.9)  HTN (hypertension) (I10)  DM (diabetes mellitus) (E11.9)  S/P BKA (below knee amputation) unilateral, right (Z89.511)  No significant past surgical history (435501806)    acetaminophen   Tablet 650 milliGRAM(s) Oral every 6 hours PRN  acetaminophen   Tablet. 650 milliGRAM(s) Oral every 6 hours PRN  ampicillin/sulbactam  IVPB 1.5 Gram(s) IV Intermittent every 12 hours  atorvastatin 40 milliGRAM(s) Oral at bedtime  cyanocobalamin 1000 MICROGram(s) Oral daily  dextrose 5%. 1000 milliLiter(s) IV Continuous <Continuous>  dextrose 50% Injectable 12.5 Gram(s) IV Push once  dextrose 50% Injectable 25 Gram(s) IV Push once  dextrose 50% Injectable 25 Gram(s) IV Push once  dextrose Gel 1 Dose(s) Oral once PRN  glucagon  Injectable 1 milliGRAM(s) IntraMuscular once PRN  heparin  Injectable 5000 Unit(s) SubCutaneous every 8 hours  influenza   Vaccine 0.5 milliLiter(s) IntraMuscular once  insulin lispro (HumaLOG) corrective regimen sliding scale   SubCutaneous three times a day before meals  labetalol 100 milliGRAM(s) Oral three times a day  lactobacillus acidophilus 1 Tablet(s) Oral daily  NIFEdipine XL 30 milliGRAM(s) Oral daily  silver sulfADIAZINE 1% Cream 1 Application(s) Topical daily  sodium bicarbonate 650 milliGRAM(s) Oral two times a day    Objective:  T(C): 37.2 (02-16-18 @ 14:05), Max: 37.2 (02-16-18 @ 14:05)  HR: 84 (02-16-18 @ 14:05) (75 - 89)  BP: 149/- (02-16-18 @ 14:05) (136/49 - 153/67)  RR: 18 (02-16-18 @ 14:05) (17 - 18)  SpO2: 96% (02-16-18 @ 14:05) (93% - 100%)    GENERAL: NAD, well-developed  HEAD:  Atraumatic, Normocephalic  EYES: EOMI, PERRLA, conjunctiva and sclera clear  NECK: Supple, No JVD  CHEST/LUNG: Decreased breath sounds in lower bases b/l  HEART: Regular rate and rhythm; No murmurs, rubs, or gallops  ABDOMEN: Soft, Nontender, Nondistended; Bowel sounds present  EXTREMITIES:  RLE: BKA. LLE sole: improving clinically. Cream on. redness improving.   PSYCH: AAOx3  NEUROLOGY: non-focal  SKIN: No rashes or lesions        CAPILLARY BLOOD GLUCOSE      (02-16 @ 06:30)                      7.6  7.48 )-----------( 190                 23.4    Neutrophils = 4.80 (64.2%)  Lymphocytes = 1.18 (15.8%)  Eosinophils = 0.19 (2.5%)  Basophils = 0.05 (0.7%)  Monocytes = 1.19 (15.9%)  Bands = --%    02-16    143  |  110<H>  |  59<H>  ----------------------------<  90  4.7   |  16<L>  |  4.56<H>    Ca    8.2<L>      16 Feb 2018 06:30    TPro  6.7  /  Alb  3.0<L>  /  TBili  0.3  /  DBili  x   /  AST  20  /  ALT  23  /  AlkPhos  86  02-16          RVP:    Venous Blood Gas:  02-16 @ 06:30  7.33/36/48/19/78.8  VBG Lactate: --          WBC Trend: 7.48<--, 8.24<--, 7.83<--    Hb Trend: 7.6<--, 7.6<--, 7.8<--, 8.1<--, 7.5<--        New imaging in last 24 hours:  Consult notes reviewed: Progress Note    JAMES PATRICK 63y (1955) Male [id number]  02-12-18 (4d)    Dr. Galvez [facility] PGY1  Pager# [phone]  cc foot cellulitis.    Subjective:  No acute events overnight. Patient seen and examined at bedside. Patient reports cough is improved. Denies SOB, nausea, vomiting, abdominal pain. Not feeling chills. Has been afebrile since yesterday.    CONSTITUTIONAL: No fever, weight loss, or fatigue  EYES: No eye pain  ENMT: No sinus or throat pain  NECK: No pain or stiffness  BREASTS: No pain, masses, or nipple discharge  RESPIRATORY: Mild cough; No shortness of breath  CARDIOVASCULAR: No chest pain, palpitations, dizziness, or leg swelling  GASTROINTESTINAL: No abdominal or epigastric pain. No nausea, vomiting, or hematemesis; No diarrhea or constipation. No melena or hematochezia.  GENITOURINARY: No dysuria, frequency, hematuria, or incontinence  NEUROLOGICAL: No headaches, memory loss, loss of strength, numbness, or tremors  SKIN: No itching, burning, rashes, or lesions   LYMPH NODES: No enlarged glands  ENDOCRINE: No heat or cold intolerance; No hair loss  MUSCULOSKELETAL: No joint pain or swelling; No muscle, back, or extremity pain  PSYCHIATRIC: No depression, anxiety, mood swings, or difficulty sleeping  HEME/LYMPH: No easy bruising, or bleeding gums  ALLERY AND IMMUNOLOGIC: No hives or eczema  PAST MEDICAL & SURGICAL HISTORY:  Kidney disease (N28.9)  HTN (hypertension) (I10)  DM (diabetes mellitus) (E11.9)  S/P BKA (below knee amputation) unilateral, right (Z89.511)  No significant past surgical history ([id number])    acetaminophen   Tablet 650 milliGRAM(s) Oral every 6 hours PRN  acetaminophen   Tablet. 650 milliGRAM(s) Oral every 6 hours PRN  ampicillin/sulbactam  IVPB 1.5 Gram(s) IV Intermittent every 12 hours  atorvastatin 40 milliGRAM(s) Oral at bedtime  cyanocobalamin 1000 MICROGram(s) Oral daily  dextrose 5%. 1000 milliLiter(s) IV Continuous <Continuous>  dextrose 50% Injectable 12.5 Gram(s) IV Push once  dextrose 50% Injectable 25 Gram(s) IV Push once  dextrose 50% Injectable 25 Gram(s) IV Push once  dextrose Gel 1 Dose(s) Oral once PRN  glucagon  Injectable 1 milliGRAM(s) IntraMuscular once PRN  heparin  Injectable 5000 Unit(s) SubCutaneous every 8 hours  influenza   Vaccine 0.5 milliLiter(s) IntraMuscular once  insulin lispro (HumaLOG) corrective regimen sliding scale   SubCutaneous three times a day before meals  labetalol 100 milliGRAM(s) Oral three times a day  lactobacillus acidophilus 1 Tablet(s) Oral daily  NIFEdipine XL 30 milliGRAM(s) Oral daily  silver sulfADIAZINE 1% Cream 1 Application(s) Topical daily  sodium bicarbonate 650 milliGRAM(s) Oral two times a day    Objective:  T(C): 37.2 (02-16-18 @ 14:05), Max: 37.2 (02-16-18 @ 14:05)  HR: 84 (02-16-18 @ 14:05) (75 - 89)  BP: 149/- (02-16-18 @ 14:05) (136/49 - 153/67)  RR: 18 (02-16-18 @ 14:05) (17 - 18)  SpO2: 96% (02-16-18 @ 14:05) (93% - 100%)    GENERAL: NAD, well-developed  HEAD:  Atraumatic, Normocephalic  EYES: EOMI, PERRLA, conjunctiva and sclera clear  NECK: Supple, No JVD  CHEST/LUNG: Decreased breath sounds in lower bases b/l  HEART: Regular rate and rhythm; No murmurs, rubs, or gallops  ABDOMEN: Soft, Nontender, Nondistended; Bowel sounds present  EXTREMITIES:  RLE: BKA. LLE sole: improving clinically. Cream on. redness improving.   PSYCH: AAOx3  NEUROLOGY: non-focal  SKIN: No rashes or lesions        CAPILLARY BLOOD GLUCOSE      (02-16 @ 06:30)                      7.6  7.48 )-----------( 190                 23.4    Neutrophils = 4.80 (64.2%)  Lymphocytes = 1.18 (15.8%)  Eosinophils = 0.19 (2.5%)  Basophils = 0.05 (0.7%)  Monocytes = 1.19 (15.9%)  Bands = --%    02-16    143  |  110<H>  |  59<H>  ----------------------------<  90  4.7   |  16<L>  |  4.56<H>    Ca    8.2<L>      16 Feb 2018 06:30    TPro  6.7  /  Alb  3.0<L>  /  TBili  0.3  /  DBili  x   /  AST  20  /  ALT  23  /  AlkPhos  86  02-16          RVP:    Venous Blood Gas:  02-16 @ 06:30  7.33/36/48/19/78.8  VBG Lactate: --          WBC Trend: 7.48<--, 8.24<--, 7.83<--    Hb Trend: 7.6<--, 7.6<--, 7.8<--, 8.1<--, 7.5<--        New imaging in last 24 hours:  Consult notes reviewed:

## 2018-02-16 NOTE — PROGRESS NOTE ADULT - ATTENDING COMMENTS
Patient seen and examined.  Case discussed with house staff.  Agree with above as edited.  Patient with left foot infection with severe sepsis and lactic acidosis with JANY on CKD III.  Vascular, podiatry, nephrology and infectious disease input appreciated.  Case d/w ID - change abx to IV Unasyn based on cultures  JANY on CKD III - suspect an ATN from underlying sepsis - d/w renal. trending cr.  Appreciate vasc recs - will defer angio until Cr stable - d/w renal.

## 2018-02-16 NOTE — PROGRESS NOTE ADULT - ASSESSMENT
63M LF toes, forefoot, midfoot blister with cellulitis    - Pt seen and evaluated  - Appearance improving, erythema and edema improved   - Low concern for OM  - Low concern for nec fasc  - Cont IV abx  - Silvadene to LF wound daily  - Xrays as above, no OM.  - No pod sx intervention at this time. Cont IV abx and SSD to wounds daily  - d/w attending

## 2018-02-16 NOTE — PROGRESS NOTE ADULT - ASSESSMENT
63M long standing DM  (A1C was 5.9), HTN, CKD, R BKA in 2009 presents with 3 day history of worsening foot pain.  Noted to have extensive blistering of the plantar foot which was deroofed and swabbed.   So far mssa, acinetobacter, cns, coryne.  Short course of antibiotics for possible superinfection.  Otherwise, clinically stable.  Awaiting vascular work up.  Angio next week per vascular    Suggest:  - d/c vancomycin  - change zosyn to unasyn q12 which better covers acinetobacter  - f/u vascular for angio    Please call the ID service [phone] with questions or concerns over the weekend    I will be away returning 2/27/18.  ID available.  Please call [phone]    above d/w medicine hospitalist

## 2018-02-16 NOTE — PROGRESS NOTE ADULT - SUBJECTIVE AND OBJECTIVE BOX
Patient is a 63y old  Male who presents with a chief complaint of 63M PMH DM, HTN, CKD p/w L foot pain x 3 days. (12 Feb 2018 19:33)      Vascular Surgery Attending Progress Note    Interval HPI: pt w/o c/o     Medications:  acetaminophen   Tablet 650 milliGRAM(s) Oral every 6 hours PRN  acetaminophen   Tablet. 650 milliGRAM(s) Oral every 6 hours PRN  atorvastatin 40 milliGRAM(s) Oral at bedtime  cyanocobalamin 1000 MICROGram(s) Oral daily  dextrose 5%. 1000 milliLiter(s) IV Continuous <Continuous>  dextrose 50% Injectable 12.5 Gram(s) IV Push once  dextrose 50% Injectable 25 Gram(s) IV Push once  dextrose 50% Injectable 25 Gram(s) IV Push once  dextrose Gel 1 Dose(s) Oral once PRN  glucagon  Injectable 1 milliGRAM(s) IntraMuscular once PRN  heparin  Injectable 5000 Unit(s) SubCutaneous every 8 hours  influenza   Vaccine 0.5 milliLiter(s) IntraMuscular once  insulin lispro (HumaLOG) corrective regimen sliding scale   SubCutaneous three times a day before meals  labetalol 100 milliGRAM(s) Oral three times a day  lactobacillus acidophilus 1 Tablet(s) Oral daily  NIFEdipine XL 30 milliGRAM(s) Oral daily  piperacillin/tazobactam IVPB. 3.375 Gram(s) IV Intermittent every 12 hours  silver sulfADIAZINE 1% Cream 1 Application(s) Topical daily  sodium bicarbonate 650 milliGRAM(s) Oral two times a day      Vital Signs Last 24 Hrs  T(C): 36.7 (16 Feb 2018 10:09), Max: 38.2 (15 Feb 2018 12:14)  T(F): 98.1 (16 Feb 2018 10:09), Max: 100.7 (15 Feb 2018 12:14)  HR: 79 (16 Feb 2018 10:09) (75 - 89)  BP: 142/65 (16 Feb 2018 10:09) (136/49 - 153/67)  BP(mean): --  RR: 18 (16 Feb 2018 10:09) (17 - 20)  SpO2: 97% (16 Feb 2018 10:09) (93% - 100%)  I&O's Summary      Physical Exam:  Neuro  A&Ox3 VSS  Vascular:   stable     LABS:                        7.6    7.48  )-----------( 190      ( 16 Feb 2018 06:30 )             23.4     02-16    143  |  110<H>  |  59<H>  ----------------------------<  90  4.7   |  16<L>  |  4.56<H>    Ca    8.2<L>      16 Feb 2018 06:30    TPro  6.7  /  Alb  3.0<L>  /  TBili  0.3  /  DBili  x   /  AST  20  /  ALT  23  /  AlkPhos  86  02-16        BARI NICOLAS MD  [phone]

## 2018-02-16 NOTE — PROGRESS NOTE ADULT - PROBLEM SELECTOR PLAN 1
-L foot with cellulitis.   -ID has seen patient, recs appreciated.  -Stopped vancomycin. Started on Unasyn per ID.  -Podiatry has evaluated patient and report good improvement. recs appreciated.   -Surgery has been consulted and are following. Want an angiogram; will obtain once creatinine stabilizes  -Wound culture growing Acenitobacter, corynebacterium, staph haemolyticus and staph aureus. Blood cx with NGTD.  -LLE xrays negative for free air; CT read negative for free air.

## 2018-02-16 NOTE — PROGRESS NOTE ADULT - SUBJECTIVE AND OBJECTIVE BOX
[facility] DIVISION OF KIDNEY DISEASES AND HYPERTENSION -- FOLLOW UP NOTE  --------------------------------------------------------------------------------  HPI: 64 y/o male with PMHx of HTN, DM, R BKA, CKD admitted for foot infection on IV ABX. As per review on labs from [facility] labs done in 3/2017 showed Scr was 1.51. As her pt. PMD office, last Scr checked in 7/2017 was 2.30.  Labs on admission (2/12) showed elevated Scr of 3.8 which has progressively increased to 4.56 today. Pt. currently resting in bed and denies any SOB, CP, N/V.     PAST HISTORY  --------------------------------------------------------------------------------  No significant changes to PMH, PSH, FHx, SHx, unless otherwise noted    ALLERGIES & MEDICATIONS  --------------------------------------------------------------------------------  Allergies    No Known Allergies    Intolerances      Standing Inpatient Medications  atorvastatin 40 milliGRAM(s) Oral at bedtime  cyanocobalamin 1000 MICROGram(s) Oral daily  dextrose 5%. 1000 milliLiter(s) IV Continuous <Continuous>  dextrose 50% Injectable 12.5 Gram(s) IV Push once  dextrose 50% Injectable 25 Gram(s) IV Push once  dextrose 50% Injectable 25 Gram(s) IV Push once  heparin  Injectable 5000 Unit(s) SubCutaneous every 8 hours  influenza   Vaccine 0.5 milliLiter(s) IntraMuscular once  insulin lispro (HumaLOG) corrective regimen sliding scale   SubCutaneous three times a day before meals  labetalol 100 milliGRAM(s) Oral three times a day  lactobacillus acidophilus 1 Tablet(s) Oral daily  NIFEdipine XL 30 milliGRAM(s) Oral daily  piperacillin/tazobactam IVPB. 3.375 Gram(s) IV Intermittent every 12 hours  silver sulfADIAZINE 1% Cream 1 Application(s) Topical daily  sodium bicarbonate 650 milliGRAM(s) Oral two times a day    PRN Inpatient Medications  acetaminophen   Tablet 650 milliGRAM(s) Oral every 6 hours PRN  acetaminophen   Tablet. 650 milliGRAM(s) Oral every 6 hours PRN  dextrose Gel 1 Dose(s) Oral once PRN  glucagon  Injectable 1 milliGRAM(s) IntraMuscular once PRN      REVIEW OF SYSTEMS  --------------------------------------------------------------------------------  Gen: +weakness  Skin: No rashes  Head/Eyes/Ears/Mouth: No headache  Respiratory: No dyspnea  CV: No chest pain  GI: No abdominal pain  MSK:  +foot pain       All other systems were reviewed and are negative, except as noted.    VITALS/PHYSICAL EXAM  --------------------------------------------------------------------------------  T(C): 36.5 (02-16-18 @ 04:52), Max: 38.2 (02-15-18 @ 12:14)  HR: 88 (02-16-18 @ 06:30) (75 - 89)  BP: 150/70 (02-16-18 @ 06:30) (136/49 - 153/67)  RR: 18 (02-16-18 @ 06:30) (17 - 20)  SpO2: 95% (02-16-18 @ 06:30) (93% - 100%)  Wt(kg): --        Physical Exam:  	Gen: Resting in bed   	HEENT:  supple neck  	Pulm: CTA B/L  	CV: RRR  	Abd:  soft  	LE: Warm, R  BKA;  LLE dressed to ankle   	Neuro: Awake and alert   	Psych: Normal affect and mood  	Skin: Warm, without rashes    LABS/STUDIES  --------------------------------------------------------------------------------              7.6    7.48  >-----------<  190      [02-16-18 @ 06:30]              23.4     143  |  110  |  59  ----------------------------<  90      [02-16-18 @ 06:30]  4.7   |  16  |  4.56        Ca     8.2     [02-16-18 @ 06:30]    TPro  6.7  /  Alb  3.0  /  TBili  0.3  /  DBili  x   /  AST  20  /  ALT  23  /  AlkPhos  86  [02-16-18 @ 06:30]          Creatinine Trend:  SCr 4.56 [02-16 @ 06:30]  SCr 4.11 [02-15 @ 07:20]  SCr 3.89 [02-14 @ 06:00]  SCr 3.71 [02-13 @ 07:11]  SCr 3.81 [02-12 @ 15:42]

## 2018-02-17 DIAGNOSIS — R19.7 DIARRHEA, UNSPECIFIED: ICD-10-CM

## 2018-02-17 LAB
ALBUMIN SERPL ELPH-MCNC: 3.2 G/DL — LOW (ref 3.3–5)
ALP SERPL-CCNC: 105 U/L — SIGNIFICANT CHANGE UP (ref 40–120)
ALT FLD-CCNC: 30 U/L — SIGNIFICANT CHANGE UP (ref 4–41)
AST SERPL-CCNC: 24 U/L — SIGNIFICANT CHANGE UP (ref 4–40)
BACTERIA BLD CULT: SIGNIFICANT CHANGE UP
BACTERIA BLD CULT: SIGNIFICANT CHANGE UP
BASOPHILS # BLD AUTO: 0.08 K/UL — SIGNIFICANT CHANGE UP (ref 0–0.2)
BASOPHILS NFR BLD AUTO: 0.8 % — SIGNIFICANT CHANGE UP (ref 0–2)
BILIRUB SERPL-MCNC: 0.2 MG/DL — SIGNIFICANT CHANGE UP (ref 0.2–1.2)
BUN SERPL-MCNC: 60 MG/DL — HIGH (ref 7–23)
CALCIUM SERPL-MCNC: 8.7 MG/DL — SIGNIFICANT CHANGE UP (ref 8.4–10.5)
CHLORIDE SERPL-SCNC: 108 MMOL/L — HIGH (ref 98–107)
CO2 SERPL-SCNC: 15 MMOL/L — LOW (ref 22–31)
CREAT SERPL-MCNC: 4.8 MG/DL — HIGH (ref 0.5–1.3)
EOSINOPHIL # BLD AUTO: 0.33 K/UL — SIGNIFICANT CHANGE UP (ref 0–0.5)
EOSINOPHIL NFR BLD AUTO: 3.4 % — SIGNIFICANT CHANGE UP (ref 0–6)
GLUCOSE BLDC GLUCOMTR-MCNC: 144 MG/DL — HIGH (ref 70–99)
GLUCOSE BLDC GLUCOMTR-MCNC: 185 MG/DL — HIGH (ref 70–99)
GLUCOSE BLDC GLUCOMTR-MCNC: 187 MG/DL — HIGH (ref 70–99)
GLUCOSE BLDC GLUCOMTR-MCNC: 98 MG/DL — SIGNIFICANT CHANGE UP (ref 70–99)
GLUCOSE SERPL-MCNC: 99 MG/DL — SIGNIFICANT CHANGE UP (ref 70–99)
HCT VFR BLD CALC: 25.1 % — LOW (ref 39–50)
HGB BLD-MCNC: 8.1 G/DL — LOW (ref 13–17)
IMM GRANULOCYTES # BLD AUTO: 0.12 # — SIGNIFICANT CHANGE UP
IMM GRANULOCYTES NFR BLD AUTO: 1.3 % — SIGNIFICANT CHANGE UP (ref 0–1.5)
LYMPHOCYTES # BLD AUTO: 1.59 K/UL — SIGNIFICANT CHANGE UP (ref 1–3.3)
LYMPHOCYTES # BLD AUTO: 16.6 % — SIGNIFICANT CHANGE UP (ref 13–44)
MCHC RBC-ENTMCNC: 28.3 PG — SIGNIFICANT CHANGE UP (ref 27–34)
MCHC RBC-ENTMCNC: 32.3 % — SIGNIFICANT CHANGE UP (ref 32–36)
MCV RBC AUTO: 87.8 FL — SIGNIFICANT CHANGE UP (ref 80–100)
MONOCYTES # BLD AUTO: 1.25 K/UL — HIGH (ref 0–0.9)
MONOCYTES NFR BLD AUTO: 13 % — SIGNIFICANT CHANGE UP (ref 2–14)
NEUTROPHILS # BLD AUTO: 6.21 K/UL — SIGNIFICANT CHANGE UP (ref 1.8–7.4)
NEUTROPHILS NFR BLD AUTO: 64.9 % — SIGNIFICANT CHANGE UP (ref 43–77)
NRBC # FLD: 0 — SIGNIFICANT CHANGE UP
PLATELET # BLD AUTO: 241 K/UL — SIGNIFICANT CHANGE UP (ref 150–400)
PMV BLD: 11.8 FL — SIGNIFICANT CHANGE UP (ref 7–13)
POTASSIUM SERPL-MCNC: 4.4 MMOL/L — SIGNIFICANT CHANGE UP (ref 3.5–5.3)
POTASSIUM SERPL-SCNC: 4.4 MMOL/L — SIGNIFICANT CHANGE UP (ref 3.5–5.3)
PROT SERPL-MCNC: 7.4 G/DL — SIGNIFICANT CHANGE UP (ref 6–8.3)
RBC # BLD: 2.86 M/UL — LOW (ref 4.2–5.8)
RBC # FLD: 14.1 % — SIGNIFICANT CHANGE UP (ref 10.3–14.5)
SODIUM SERPL-SCNC: 143 MMOL/L — SIGNIFICANT CHANGE UP (ref 135–145)
SPECIMEN SOURCE: SIGNIFICANT CHANGE UP
WBC # BLD: 9.58 K/UL — SIGNIFICANT CHANGE UP (ref 3.8–10.5)
WBC # FLD AUTO: 9.58 K/UL — SIGNIFICANT CHANGE UP (ref 3.8–10.5)

## 2018-02-17 PROCEDURE — 99232 SBSQ HOSP IP/OBS MODERATE 35: CPT

## 2018-02-17 PROCEDURE — 99233 SBSQ HOSP IP/OBS HIGH 50: CPT | Mod: GC

## 2018-02-17 RX ORDER — LABETALOL HCL 100 MG
200 TABLET ORAL THREE TIMES A DAY
Refills: 0 | Status: DISCONTINUED | OUTPATIENT
Start: 2018-02-17 | End: 2018-02-17

## 2018-02-17 RX ORDER — SODIUM BICARBONATE 1 MEQ/ML
650 SYRINGE (ML) INTRAVENOUS THREE TIMES A DAY
Refills: 0 | Status: DISCONTINUED | OUTPATIENT
Start: 2018-02-17 | End: 2018-02-21

## 2018-02-17 RX ORDER — LABETALOL HCL 100 MG
200 TABLET ORAL THREE TIMES A DAY
Refills: 0 | Status: DISCONTINUED | OUTPATIENT
Start: 2018-02-17 | End: 2018-02-19

## 2018-02-17 RX ADMIN — Medication 650 MILLIGRAM(S): at 05:48

## 2018-02-17 RX ADMIN — ATORVASTATIN CALCIUM 40 MILLIGRAM(S): 80 TABLET, FILM COATED ORAL at 21:58

## 2018-02-17 RX ADMIN — Medication 650 MILLIGRAM(S): at 21:58

## 2018-02-17 RX ADMIN — HEPARIN SODIUM 5000 UNIT(S): 5000 INJECTION INTRAVENOUS; SUBCUTANEOUS at 21:56

## 2018-02-17 RX ADMIN — Medication 650 MILLIGRAM(S): at 13:44

## 2018-02-17 RX ADMIN — Medication 200 MILLIGRAM(S): at 21:58

## 2018-02-17 RX ADMIN — Medication 100 MILLIGRAM(S): at 05:48

## 2018-02-17 RX ADMIN — HEPARIN SODIUM 5000 UNIT(S): 5000 INJECTION INTRAVENOUS; SUBCUTANEOUS at 13:53

## 2018-02-17 RX ADMIN — Medication 1: at 12:25

## 2018-02-17 RX ADMIN — AMPICILLIN SODIUM AND SULBACTAM SODIUM 100 GRAM(S): 250; 125 INJECTION, POWDER, FOR SUSPENSION INTRAMUSCULAR; INTRAVENOUS at 05:48

## 2018-02-17 RX ADMIN — Medication 200 MILLIGRAM(S): at 13:54

## 2018-02-17 RX ADMIN — Medication 1 TABLET(S): at 13:45

## 2018-02-17 RX ADMIN — HEPARIN SODIUM 5000 UNIT(S): 5000 INJECTION INTRAVENOUS; SUBCUTANEOUS at 05:48

## 2018-02-17 RX ADMIN — AMPICILLIN SODIUM AND SULBACTAM SODIUM 100 GRAM(S): 250; 125 INJECTION, POWDER, FOR SUSPENSION INTRAMUSCULAR; INTRAVENOUS at 17:38

## 2018-02-17 RX ADMIN — Medication 1: at 17:38

## 2018-02-17 RX ADMIN — Medication 30 MILLIGRAM(S): at 05:48

## 2018-02-17 RX ADMIN — PREGABALIN 1000 MICROGRAM(S): 225 CAPSULE ORAL at 13:45

## 2018-02-17 NOTE — PROGRESS NOTE ADULT - ASSESSMENT
64 y/o male with PMHx of HTN, DM, R BKA, CKD admitted for foot infection on IV ABX and was found to have JANY. 64 y/o male with PMH of HTN, DM, right BKA and CKD admitted with left foot infection. Pt. now with JANY.

## 2018-02-17 NOTE — PROGRESS NOTE ADULT - ASSESSMENT
63M PMH DM on januvia, FS 80s at home, HTN, CKD, R BKA in 2009 presents with 3 day history of worsening foot pain concerning for cellulitis vs osteo, currently stable but spiked fever yesterday. 63M PMH DM on januvia, FS 80s at home, HTN, CKD, R BKA in 2009 presents with 3 day history of worsening foot pain concerning for cellulitis vs osteo, currently stable, now with diarrhea.

## 2018-02-17 NOTE — PROGRESS NOTE ADULT - PROBLEM SELECTOR PLAN 1
-L foot with cellulitis with resulting sepsis.  -ID is following; recs appreciated. S/p vancomycin and zosyn. Started on Unasyn per ID.  -Podiatry has evaluated patient and report good improvement. recs appreciated.   -Surgery has been consulted and are following. Want an angiogram; will obtain once creatinine stabilizes  -Wound culture growing Acenitobacter, corynebacterium, staph haemolyticus and staph aureus. Blood cx with NGTD.  -LLE xrays negative for free air; CT read negative for free air. -L foot with cellulitis with resulting sepsis.  -ID is following; recs appreciated. S/p vancomycin and zosyn. Started on Unasyn per ID through 2/22.  -Podiatry has evaluated patient and report good improvement. recs appreciated.   -Surgery has been consulted and are following. Want an angiogram; will obtain once creatinine stabilizes  -Wound culture growing Acenitobacter, corynebacterium, staph haemolyticus and staph aureus. Blood cx with NGTD.  -LLE xrays negative for free air; CT read negative for free air.

## 2018-02-17 NOTE — PROGRESS NOTE ADULT - ATTENDING COMMENTS
Patient seen and examined.  Case discussed with house staff.  Agree with above as edited.  Patient with left foot infection with severe sepsis and lactic acidosis with JANY on CKD III.  Vascular, podiatry, nephrology and infectious disease input appreciated.  Case d/w ID - change abx to IV Unasyn based on cultures  Diarrhea, r/o C-diff - d/w RN - Stool cdiff ordered  JANY on CKD III - suspect an ATN from underlying sepsis - d/w renal. trending cr.  Appreciate vasc recs - will defer angio until Cr stable - d/w renal.

## 2018-02-17 NOTE — PROGRESS NOTE ADULT - PROBLEM SELECTOR PLAN 1
Pt. with AKIon CKD. CKD in the setting of long-standing HTN/DM. JANY likely hemodynamically mediated in the setting of infection and diarrhea. As per review on labs from [place] Scr was 1.5 in 3/2017. As her pt. PMD office, last Scr checked in 7/2017 was 2.30. Scr on admission (2/12) was elevated at 3.81 and increased to 4.8 today. Renal sonogram showed no evidence of obstruction/hydronephrosis. Monitor BMP, urine output, I/Os. Avoid RCA, NSAIDs, nephrotoxins Pt. with JANY on CKD in the setting of infection and diarrhea. CKD in the setting of long-standing DM. Scr was 1.5 in 3/2017, increased to 2.30 in 7/2017. Scr on admission (2/12) was elevated at 3.81 and increased to 4.8 today. Renal sonogram showed no evidence of obstruction/hydronephrosis. Monitor BMP and urine  output. Avoid any potential nephrotoxins

## 2018-02-17 NOTE — PROGRESS NOTE ADULT - PROBLEM SELECTOR PLAN 6
-Heparin subq  -Dispo: PT recommends rehab. -Pt on oral medications at home.   -Start on sliding scale.  -Hemoglobin a1c: 5.9%  -Diabetic precautions

## 2018-02-17 NOTE — PROGRESS NOTE ADULT - ASSESSMENT
63M long standing DM   presents with 3 day history of worsening foot pain.  Noted to have extensive blistering of the plantar foot which was deroofed and swabbed with polymicrobial growth.     Favor  Short course of antibiotics for possible superinfection.  Continue unasyn for now  Continue abd through 2/22  If stable for d/c prior to that- cahnge to po augmentin    Awaiting vascular work up.    Angio next week per vascular    Suggest:  - d/c vancomycin  - change zosyn to unasyn q12 which better covers acinetobacter  - f/u vascular for angio 63M long standing DM   presents with 3 day history of worsening foot pain.  Noted to have extensive blistering of the plantar foot which was deroofed and swabbed with polymicrobial growth.     Favor  Short course of antibiotics for possible superinfection.  Continue unasyn for now  Continue abd through 2/22  If stable for d/c prior to that- cahnge to po augmentin    Awaiting vascular work up.    Angio next week per vascular

## 2018-02-17 NOTE — PROGRESS NOTE ADULT - PROBLEM SELECTOR PLAN 3
-Improved today. RVP negative. Satting well on room air.  -CXR concerning for fluid overload. Bedside ultrasound confirming. -Likely abx induced.   -Will r/o c diff. -Likely abx induced.   -Will r/o c diff.  -Bicarb and K may be lower 2/2 diarrhea.

## 2018-02-17 NOTE — PROGRESS NOTE ADULT - SUBJECTIVE AND OBJECTIVE BOX
Progress Note    JAMES PATRICK 63y (1955) Male [id number]  02-12-18 (5d)    Dr. Galvez [facility] PGY1  Pager# [phone]    Subjective:  No acute events overnight. Patient seen and examined at bedside. Patient reports continued cough and now has had 6 episodes of watery diarrhea mixed with soft stool. No nausea, vomiting, or abdominal pain. No fevers. +Chills. No shortness of breath or chest pain.    ROS: as above  PAST MEDICAL & SURGICAL HISTORY:  Kidney disease (N28.9)  HTN (hypertension) (I10)  DM (diabetes mellitus) (E11.9)  S/P BKA (below knee amputation) unilateral, right (Z89.511)  No significant past surgical history ([id number])    acetaminophen   Tablet 650 milliGRAM(s) Oral every 6 hours PRN  acetaminophen   Tablet. 650 milliGRAM(s) Oral every 6 hours PRN  ampicillin/sulbactam  IVPB 1.5 Gram(s) IV Intermittent every 12 hours  atorvastatin 40 milliGRAM(s) Oral at bedtime  benzonatate 100 milliGRAM(s) Oral every 6 hours PRN  cyanocobalamin 1000 MICROGram(s) Oral daily  dextrose 5%. 1000 milliLiter(s) IV Continuous <Continuous>  dextrose 50% Injectable 12.5 Gram(s) IV Push once  dextrose 50% Injectable 25 Gram(s) IV Push once  dextrose 50% Injectable 25 Gram(s) IV Push once  dextrose Gel 1 Dose(s) Oral once PRN  glucagon  Injectable 1 milliGRAM(s) IntraMuscular once PRN  heparin  Injectable 5000 Unit(s) SubCutaneous every 8 hours  influenza   Vaccine 0.5 milliLiter(s) IntraMuscular once  insulin lispro (HumaLOG) corrective regimen sliding scale   SubCutaneous three times a day before meals  labetalol 200 milliGRAM(s) Oral three times a day  lactobacillus acidophilus 1 Tablet(s) Oral daily  NIFEdipine XL 30 milliGRAM(s) Oral daily  silver sulfADIAZINE 1% Cream 1 Application(s) Topical daily  sodium bicarbonate 650 milliGRAM(s) Oral two times a day    Objective:  T(C): 37.6 (02-17-18 @ 05:21), Max: 37.6 (02-17-18 @ 05:21)  HR: 93 (02-17-18 @ 05:21) (78 - 93)  BP: 154/66 (02-17-18 @ 05:21) (142/65 - 154/66)  RR: 18 (02-17-18 @ 05:21) (18 - 20)  SpO2: 99% (02-17-18 @ 05:21) (93% - 99%)    GENERAL: NAD, well-developed  HEAD:  Atraumatic, Normocephalic  EYES: EOMI,  conjunctiva and sclera clear  NECK: Supple, No JVD  CHEST/LUNG: Diminished breath sounds in lower bases.  HEART: Regular rate and rhythm; No murmurs, rubs, or gallops  ABDOMEN: Soft, Nontender, Nondistended; Bowel sounds present  EXTREMITIES: LLE seems to be improving. Erythema on sole much improved  PSYCH: AAOx3  NEUROLOGY: non-focal  SKIN: No rashes or lesions        CAPILLARY BLOOD GLUCOSE      (02-17 @ 07:00)                      8.1  9.58 )-----------( 241                 25.1    Neutrophils = 6.21 (64.9%)  Lymphocytes = 1.59 (16.6%)  Eosinophils = 0.33 (3.4%)  Basophils = 0.08 (0.8%)  Monocytes = 1.25 (13.0%)  Bands = --%    02-17    143  |  108<H>  |  60<H>  ----------------------------<  99  4.4   |  15<L>  |  4.80<H>    Ca    8.7      17 Feb 2018 07:00    TPro  7.4  /  Alb  3.2<L>  /  TBili  0.2  /  DBili  x   /  AST  24  /  ALT  30  /  AlkPhos  105  02-17        WBC Trend: 9.58<--, 7.48<--, 8.24<--    Hb Trend: 8.1<--, 7.6<--, 7.6<--, 7.8<--, 8.1<--        New imaging in last 24 hours:  Consult notes reviewed: podiatry Progress Note    JAMES PATRICK 63y (1955) Male [id number]  02-12-18 (5d)    DORON Thibodeaux PGY1  Pager# [phone]  cc: foot infection  Subjective:  No acute events overnight. Patient seen and examined at bedside. Patient reports continued cough and now has had 6 episodes of watery diarrhea mixed with soft stool. No nausea, vomiting, or abdominal pain. No fevers. +Chills. No shortness of breath or chest pain.    ROS: as above  PAST MEDICAL & SURGICAL HISTORY:  Kidney disease (N28.9)  HTN (hypertension) (I10)  DM (diabetes mellitus) (E11.9)  S/P BKA (below knee amputation) unilateral, right (Z89.511)  No significant past surgical history ([id number])    acetaminophen   Tablet 650 milliGRAM(s) Oral every 6 hours PRN  acetaminophen   Tablet. 650 milliGRAM(s) Oral every 6 hours PRN  ampicillin/sulbactam  IVPB 1.5 Gram(s) IV Intermittent every 12 hours  atorvastatin 40 milliGRAM(s) Oral at bedtime  benzonatate 100 milliGRAM(s) Oral every 6 hours PRN  cyanocobalamin 1000 MICROGram(s) Oral daily  dextrose 5%. 1000 milliLiter(s) IV Continuous <Continuous>  dextrose 50% Injectable 12.5 Gram(s) IV Push once  dextrose 50% Injectable 25 Gram(s) IV Push once  dextrose 50% Injectable 25 Gram(s) IV Push once  dextrose Gel 1 Dose(s) Oral once PRN  glucagon  Injectable 1 milliGRAM(s) IntraMuscular once PRN  heparin  Injectable 5000 Unit(s) SubCutaneous every 8 hours  influenza   Vaccine 0.5 milliLiter(s) IntraMuscular once  insulin lispro (HumaLOG) corrective regimen sliding scale   SubCutaneous three times a day before meals  labetalol 200 milliGRAM(s) Oral three times a day  lactobacillus acidophilus 1 Tablet(s) Oral daily  NIFEdipine XL 30 milliGRAM(s) Oral daily  silver sulfADIAZINE 1% Cream 1 Application(s) Topical daily  sodium bicarbonate 650 milliGRAM(s) Oral two times a day    Objective:  T(C): 37.6 (02-17-18 @ 05:21), Max: 37.6 (02-17-18 @ 05:21)  HR: 93 (02-17-18 @ 05:21) (78 - 93)  BP: 154/66 (02-17-18 @ 05:21) (142/65 - 154/66)  RR: 18 (02-17-18 @ 05:21) (18 - 20)  SpO2: 99% (02-17-18 @ 05:21) (93% - 99%)    GENERAL: NAD, well-developed  HEAD:  Atraumatic, Normocephalic  EYES: EOMI,  conjunctiva and sclera clear  NECK: Supple, No JVD  CHEST/LUNG: Diminished breath sounds in lower bases.  HEART: Regular rate and rhythm; No murmurs, rubs, or gallops  ABDOMEN: Soft, Nontender, Nondistended; Bowel sounds present  EXTREMITIES: LLE seems to be improving. Erythema on sole much improved  PSYCH: AAOx3  NEUROLOGY: non-focal  SKIN: No rashes or lesions        CAPILLARY BLOOD GLUCOSE      (02-17 @ 07:00)                      8.1  9.58 )-----------( 241                 25.1    Neutrophils = 6.21 (64.9%)  Lymphocytes = 1.59 (16.6%)  Eosinophils = 0.33 (3.4%)  Basophils = 0.08 (0.8%)  Monocytes = 1.25 (13.0%)  Bands = --%    02-17    143  |  108<H>  |  60<H>  ----------------------------<  99  4.4   |  15<L>  |  4.80<H>    Ca    8.7      17 Feb 2018 07:00    TPro  7.4  /  Alb  3.2<L>  /  TBili  0.2  /  DBili  x   /  AST  24  /  ALT  30  /  AlkPhos  105  02-17        WBC Trend: 9.58<--, 7.48<--, 8.24<--    Hb Trend: 8.1<--, 7.6<--, 7.6<--, 7.8<--, 8.1<--        New imaging in last 24 hours:  Consult notes reviewed: podiatry

## 2018-02-17 NOTE — PROGRESS NOTE ADULT - SUBJECTIVE AND OBJECTIVE BOX
pt seen at bedside in NAD. dressing to left foot c/d/i  ulceration plantar foot noted from forefoot to midfoot  superficial appears to be a burn no pus erythema decreased mild edema  b/l LE  applied betadine with DSD  follow up angio plan for Tuesday   will continue to follow 2-3x weeks if foot worsens please call [phone] ASAP

## 2018-02-17 NOTE — PROGRESS NOTE ADULT - SUBJECTIVE AND OBJECTIVE BOX
Patient is a 63y old  Male who presents with a chief complaint of 63M PMH DM, HTN, CKD p/w L foot pain x 3 days. (12 Feb 2018 19:33)      Vascular Surgery Attending Progress Note    Interval HPI: pt w/o c/o     Medications:  acetaminophen   Tablet 650 milliGRAM(s) Oral every 6 hours PRN  acetaminophen   Tablet. 650 milliGRAM(s) Oral every 6 hours PRN  ampicillin/sulbactam  IVPB 1.5 Gram(s) IV Intermittent every 12 hours  atorvastatin 40 milliGRAM(s) Oral at bedtime  benzonatate 100 milliGRAM(s) Oral every 6 hours PRN  cyanocobalamin 1000 MICROGram(s) Oral daily  dextrose 5%. 1000 milliLiter(s) IV Continuous <Continuous>  dextrose 50% Injectable 12.5 Gram(s) IV Push once  dextrose 50% Injectable 25 Gram(s) IV Push once  dextrose 50% Injectable 25 Gram(s) IV Push once  dextrose Gel 1 Dose(s) Oral once PRN  glucagon  Injectable 1 milliGRAM(s) IntraMuscular once PRN  heparin  Injectable 5000 Unit(s) SubCutaneous every 8 hours  influenza   Vaccine 0.5 milliLiter(s) IntraMuscular once  insulin lispro (HumaLOG) corrective regimen sliding scale   SubCutaneous three times a day before meals  labetalol 200 milliGRAM(s) Oral three times a day  lactobacillus acidophilus 1 Tablet(s) Oral daily  NIFEdipine XL 30 milliGRAM(s) Oral daily  silver sulfADIAZINE 1% Cream 1 Application(s) Topical daily  sodium bicarbonate 650 milliGRAM(s) Oral three times a day      Vital Signs Last 24 Hrs  T(C): 37.6 (17 Feb 2018 12:48), Max: 37.6 (17 Feb 2018 05:21)  T(F): 99.6 (17 Feb 2018 12:48), Max: 99.6 (17 Feb 2018 05:21)  HR: 84 (17 Feb 2018 12:48) (78 - 93)  BP: 152/51 (17 Feb 2018 12:48) (142/70 - 154/66)  BP(mean): --  RR: 19 (17 Feb 2018 12:48) (18 - 20)  SpO2: 93% (17 Feb 2018 12:48) (93% - 99%)  I&O's Summary    17 Feb 2018 07:01  -  17 Feb 2018 19:22  --------------------------------------------------------  IN: 410 mL / OUT: 0 mL / NET: 410 mL        Physical Exam:  Neuro  A&Ox3 VSS  Vascular:  lt foot ulcer stable limited healing   Musculoskeletal: wnl    LABS:                        8.1    9.58  )-----------( 241      ( 17 Feb 2018 07:00 )             25.1     02-17    143  |  108<H>  |  60<H>  ----------------------------<  99  4.4   |  15<L>  |  4.80<H>    Ca    8.7      17 Feb 2018 07:00    TPro  7.4  /  Alb  3.2<L>  /  TBili  0.2  /  DBili  x   /  AST  24  /  ALT  30  /  AlkPhos  105  02-17        BARI NICOLAS MD  [phone]

## 2018-02-17 NOTE — PROGRESS NOTE ADULT - SUBJECTIVE AND OBJECTIVE BOX
Follow Up:      Inverval History/ROS:Patient is a 63y old  Male who presents with a chief complaint of L foot pain x 3 days. (12 Feb 2018 19:33)    No fever. No Events    Allergies    No Known Allergies    Intolerances        ANTIMICROBIALS:  ampicillin/sulbactam  IVPB 1.5 every 12 hours      OTHER MEDS:  acetaminophen   Tablet 650 milliGRAM(s) Oral every 6 hours PRN  acetaminophen   Tablet. 650 milliGRAM(s) Oral every 6 hours PRN  atorvastatin 40 milliGRAM(s) Oral at bedtime  benzonatate 100 milliGRAM(s) Oral every 6 hours PRN  cyanocobalamin 1000 MICROGram(s) Oral daily  dextrose 5%. 1000 milliLiter(s) IV Continuous <Continuous>  dextrose 50% Injectable 12.5 Gram(s) IV Push once  dextrose 50% Injectable 25 Gram(s) IV Push once  dextrose 50% Injectable 25 Gram(s) IV Push once  dextrose Gel 1 Dose(s) Oral once PRN  glucagon  Injectable 1 milliGRAM(s) IntraMuscular once PRN  heparin  Injectable 5000 Unit(s) SubCutaneous every 8 hours  influenza   Vaccine 0.5 milliLiter(s) IntraMuscular once  insulin lispro (HumaLOG) corrective regimen sliding scale   SubCutaneous three times a day before meals  labetalol 200 milliGRAM(s) Oral three times a day  lactobacillus acidophilus 1 Tablet(s) Oral daily  NIFEdipine XL 30 milliGRAM(s) Oral daily  silver sulfADIAZINE 1% Cream 1 Application(s) Topical daily  sodium bicarbonate 650 milliGRAM(s) Oral three times a day      Vital Signs Last 24 Hrs  T(C): 37.6 (17 Feb 2018 05:21), Max: 37.6 (17 Feb 2018 05:21)  T(F): 99.6 (17 Feb 2018 05:21), Max: 99.6 (17 Feb 2018 05:21)  HR: 93 (17 Feb 2018 05:21) (78 - 93)  BP: 154/66 (17 Feb 2018 05:21) (142/65 - 154/66)  BP(mean): 57 (16 Feb 2018 14:05) (57 - 57)  RR: 18 (17 Feb 2018 05:21) (18 - 20)  SpO2: 99% (17 Feb 2018 05:21) (93% - 99%)    PHYSICAL EXAM:  General: x ] non-toxic  HEAD/EYES: [ ] PERRL [x ] white sclera [ ] icterus  ENT:  [ ] normal [ ] supple [ ] thrush [ ] pharyngeal exudate  Cardiovascular:   [ ] murmur [x ] normal [ ] PPM/AICD  Respiratory:  [ x] clear to ausculation bilaterally  GI:  x[ ] soft, non-tender, normal bowel sounds  :  [ ] cervantes [ ] no CVA tenderness   Musculoskeletal:  [ ] no synovitis  Neurologic:  [ ] non-focal exam   Skin:  [ ] no rash  Lymph: [ ] no lymphadenopathy  Psychiatric:  [x ] appropriate affect [ ] alert & oriented  Lines:  [ x] no phlebitis [ ] central line                                8.1    9.58  )-----------( 241      ( 17 Feb 2018 07:00 )             25.1       02-17    143  |  108<H>  |  60<H>  ----------------------------<  99  4.4   |  15<L>  |  4.80<H>    Ca    8.7      17 Feb 2018 07:00    TPro  7.4  /  Alb  3.2<L>  /  TBili  0.2  /  DBili  x   /  AST  24  /  ALT  30  /  AlkPhos  105  02-17          MICROBIOLOGY:Culture Results:   MANY  OXICILLIN-RESISTANT staphylococci should be regarded as  RESISTANT to ALL other Beta-Lactams regardless of the  in-vitro results obtained.  These include: ALL  Penicillins, Cephalosporins, Amoxicillin-clavulanic  acid, Ticarcillin-clavulanic acid,  Ampicillin-sulbactam, and Imipenem. (02-12-18 @ 18:45)      RADIOLOGY: Follow Up:      Inverval History/ROS:Patient is a 63y old  Male who presents with a chief complaint of L foot pain x 3 days. (12 Feb 2018 19:33)    No fever. No Events    Allergies    No Known Allergies    Intolerances        ANTIMICROBIALS:  ampicillin/sulbactam  IVPB 1.5 every 12 hours      OTHER MEDS:  acetaminophen   Tablet 650 milliGRAM(s) Oral every 6 hours PRN  acetaminophen   Tablet. 650 milliGRAM(s) Oral every 6 hours PRN  atorvastatin 40 milliGRAM(s) Oral at bedtime  benzonatate 100 milliGRAM(s) Oral every 6 hours PRN  cyanocobalamin 1000 MICROGram(s) Oral daily  dextrose 5%. 1000 milliLiter(s) IV Continuous <Continuous>  dextrose 50% Injectable 12.5 Gram(s) IV Push once  dextrose 50% Injectable 25 Gram(s) IV Push once  dextrose 50% Injectable 25 Gram(s) IV Push once  dextrose Gel 1 Dose(s) Oral once PRN  glucagon  Injectable 1 milliGRAM(s) IntraMuscular once PRN  heparin  Injectable 5000 Unit(s) SubCutaneous every 8 hours  influenza   Vaccine 0.5 milliLiter(s) IntraMuscular once  insulin lispro (HumaLOG) corrective regimen sliding scale   SubCutaneous three times a day before meals  labetalol 200 milliGRAM(s) Oral three times a day  lactobacillus acidophilus 1 Tablet(s) Oral daily  NIFEdipine XL 30 milliGRAM(s) Oral daily  silver sulfADIAZINE 1% Cream 1 Application(s) Topical daily  sodium bicarbonate 650 milliGRAM(s) Oral three times a day      Vital Signs Last 24 Hrs  T(C): 37.6 (17 Feb 2018 05:21), Max: 37.6 (17 Feb 2018 05:21)  T(F): 99.6 (17 Feb 2018 05:21), Max: 99.6 (17 Feb 2018 05:21)  HR: 93 (17 Feb 2018 05:21) (78 - 93)  BP: 154/66 (17 Feb 2018 05:21) (142/65 - 154/66)  BP(mean): 57 (16 Feb 2018 14:05) (57 - 57)  RR: 18 (17 Feb 2018 05:21) (18 - 20)  SpO2: 99% (17 Feb 2018 05:21) (93% - 99%)    PHYSICAL EXAM:  General: x ] non-toxic  HEAD/EYES: [ ] PERRL [x ] white sclera [ ] icterus  ENT:  [ ] normal [ ] supple [ ] thrush [ ] pharyngeal exudate  Cardiovascular:   [ ] murmur [x ] normal [ ] PPM/AICD  Respiratory:  [ x] clear to ausculation bilaterally  GI:  x[ ] soft, non-tender, normal bowel sounds  :  [ ] cervantes [ ] no CVA tenderness   Musculoskeletal:  [ ] no synovitis  Neurologic:  [ ] non-focal exam   Skin:  [ ] dressing in place  Lymph: [ ] no lymphadenopathy  Psychiatric:  [x ] appropriate affect [ ] alert & oriented  Lines:  [ x] no phlebitis [ ] central line                                8.1    9.58  )-----------( 241      ( 17 Feb 2018 07:00 )             25.1       02-17    143  |  108<H>  |  60<H>  ----------------------------<  99  4.4   |  15<L>  |  4.80<H>    Ca    8.7      17 Feb 2018 07:00    TPro  7.4  /  Alb  3.2<L>  /  TBili  0.2  /  DBili  x   /  AST  24  /  ALT  30  /  AlkPhos  105  02-17          MICROBIOLOGY:Culture Results:   MANY  OXICILLIN-RESISTANT staphylococci should be regarded as  RESISTANT to ALL other Beta-Lactams regardless of the  in-vitro results obtained.  These include: ALL  Penicillins, Cephalosporins, Amoxicillin-clavulanic  acid, Ticarcillin-clavulanic acid,  Ampicillin-sulbactam, and Imipenem. (02-12-18 @ 18:45)      RADIOLOGY:

## 2018-02-17 NOTE — PROGRESS NOTE ADULT - PROBLEM SELECTOR PLAN 5
-Pt on oral medications at home.   -Start on sliding scale.  -Hemoglobin a1c: 5.9%  -Diabetic precautions -Started on home medications.  -Labetalol increased to 200mg TID as BP still uncontrolled; continuing on Nifedipine 30mg XL.

## 2018-02-17 NOTE — PROGRESS NOTE ADULT - PROBLEM SELECTOR PLAN 2
-JANY on evolving ATN on CKD stage III  -Renal consult appreciated. Renal u/s negative. Spun down urine demonstrating granular casts suggesting ATN. Baseline creatinine is 2.3.   -Awaiting creatinine stabilization  -Avoiding nephrotoxins and RCA. -JANY on evolving ATN on CKD stage III  -Renal consult appreciated. Renal u/s negative. Spun down urine demonstrating granular casts suggesting ATN. Baseline creatinine is 2.3.   -Awaiting creatinine stabilization (not yet, currently worsening)  -Avoiding nephrotoxins and RCA. -JANY on evolving ATN on CKD stage III  -Renal consult appreciated. Renal u/s negative. Spun down urine demonstrating granular casts suggesting ATN. Baseline creatinine is 2.3.   -Awaiting creatinine stabilization (not yet, currently worsening)  -Avoiding nephrotoxins and RCA.  -Increased sodium bicarb to 650 TID as trending down. -JANY on evolving ATN on CKD stage III  -Patient still urinating  -Renal consult appreciated. Renal u/s negative. Spun down urine demonstrating granular casts suggesting ATN. Baseline creatinine is 2.3.   -Awaiting creatinine stabilization (not yet, currently worsening)  -Avoiding nephrotoxins and RCA.  -Increased sodium bicarb to 650 TID as trending down.

## 2018-02-17 NOTE — PROGRESS NOTE ADULT - PROBLEM SELECTOR PLAN 4
-Started on home medications.  -Labetalol increased to 200mg TID as BP still uncontrolled; continuing on Nifedipine 30mg XL. -Improved today. RVP negative. Satting well on room air.  -CXR concerning for fluid overload. Bedside ultrasound confirming. No increased work of breathing. -Improved today. RVP negative. Satting well on room air.  -CXR concerning for fluid overload. Bedside ultrasound confirming. No increased work of breathing.  -Tessalon perles

## 2018-02-17 NOTE — PROGRESS NOTE ADULT - ASSESSMENT
64yo M with DM and R BKA, now presenting with Diabetic foot soft tissue infection. No evidence of necrotizing fasciitis or systemic infection.     - Wound care as per podiatry   lle angio tent rescheduled for tue to allow optimazation by renal  await renal clearance for lle angio  will follow

## 2018-02-17 NOTE — PROGRESS NOTE ADULT - SUBJECTIVE AND OBJECTIVE BOX
[facility] DIVISION OF KIDNEY DISEASES AND HYPERTENSION -- [phone]   FOLLOW UP NOTE  --------------------------------------------------------------------------------  HPI:  62 y/o male with PMHx of HTN, DM, R BKA, CKD admitted for foot infection on IV ABX. As per review on labs from [employer] labs done in 3/2017 showed Scr was 1.51. As her pt. PMD office, last Scr checked in 7/2017 was 2.30.  Labs on admission (2/12) showed elevated Scr of 3.8 which has progressively increased to 4.56 today. Pt. currently resting in bed and denies any SOB, CP, N/V.     PAST HISTORY  --------------------------------------------------------------------------------  No significant changes to PMH, PSH, FHx, SHx, unless otherwise noted    ALLERGIES & MEDICATIONS  --------------------------------------------------------------------------------  Allergies    No Known Allergies    Intolerances      Standing Inpatient Medications  ampicillin/sulbactam  IVPB 1.5 Gram(s) IV Intermittent every 12 hours  atorvastatin 40 milliGRAM(s) Oral at bedtime  cyanocobalamin 1000 MICROGram(s) Oral daily  dextrose 5%. 1000 milliLiter(s) IV Continuous <Continuous>  dextrose 50% Injectable 12.5 Gram(s) IV Push once  dextrose 50% Injectable 25 Gram(s) IV Push once  dextrose 50% Injectable 25 Gram(s) IV Push once  heparin  Injectable 5000 Unit(s) SubCutaneous every 8 hours  influenza   Vaccine 0.5 milliLiter(s) IntraMuscular once  insulin lispro (HumaLOG) corrective regimen sliding scale   SubCutaneous three times a day before meals  labetalol 200 milliGRAM(s) Oral three times a day  lactobacillus acidophilus 1 Tablet(s) Oral daily  NIFEdipine XL 30 milliGRAM(s) Oral daily  silver sulfADIAZINE 1% Cream 1 Application(s) Topical daily  sodium bicarbonate 650 milliGRAM(s) Oral three times a day    PRN Inpatient Medications  acetaminophen   Tablet 650 milliGRAM(s) Oral every 6 hours PRN  acetaminophen   Tablet. 650 milliGRAM(s) Oral every 6 hours PRN  benzonatate 100 milliGRAM(s) Oral every 6 hours PRN  dextrose Gel 1 Dose(s) Oral once PRN  glucagon  Injectable 1 milliGRAM(s) IntraMuscular once PRN      REVIEW OF SYSTEMS  --------------------------------------------------------------------------------  General: no fever  CVS: no chest pain  RESP: no sob, no cough  ABD: no abdominal pain, + diarrhea   : no dysuria,  MSK: no edema     VITALS/PHYSICAL EXAM  --------------------------------------------------------------------------------  T(C): 37.6 (02-17-18 @ 05:21), Max: 37.6 (02-17-18 @ 05:21)  HR: 93 (02-17-18 @ 05:21) (78 - 93)  BP: 154/66 (02-17-18 @ 05:21) (142/65 - 154/66)  RR: 18 (02-17-18 @ 05:21) (18 - 20)  SpO2: 99% (02-17-18 @ 05:21) (93% - 99%)  Wt(kg): --        Physical Exam:  	Gen: Resting in bed   	HEENT:  supple neck  	Pulm: CTA B/L  	CV: RRR  	Abd:  soft  	LE: Warm, R  BKA;  LLE dressed to ankle   	Neuro: Awake and alert   	Psych: Normal affect and mood  	Skin: Warm, without rashes    LABS/STUDIES  --------------------------------------------------------------------------------              8.1    9.58  >-----------<  241      [02-17-18 @ 07:00]              25.1     143  |  108  |  60  ----------------------------<  99      [02-17-18 @ 07:00]  4.4   |  15  |  4.80        Ca     8.7     [02-17-18 @ 07:00]    TPro  7.4  /  Alb  3.2  /  TBili  0.2  /  DBili  x   /  AST  24  /  ALT  30  /  AlkPhos  105  [02-17-18 @ 07:00]          Creatinine Trend:  SCr 4.80 [02-17 @ 07:00]  SCr 4.56 [02-16 @ 06:30]  SCr 4.11 [02-15 @ 07:20]  SCr 3.89 [02-14 @ 06:00]  SCr 3.71 [02-13 @ 07:11]        HbA1c 5.9      [02-13-18 @ 07:11] [facility] DIVISION OF KIDNEY DISEASES AND HYPERTENSION -- [phone]   FOLLOW UP NOTE  --------------------------------------------------------------------------------  HPI: 64 y/o male with PMH of HTN, DM, right BKA, CKD admitted for left foot infection. As per review of labs on [place]/[employer], Scr was 1.51 in 3/2017. As her pt. PMD's office, Scr checked in 7/2017 was 2.30.  Labs on admission (2/12) showed elevated Scr of 3.8 which has progressively increased to 4.56 today. Pt. currently resting in bed and denies any SOB, CP, N/V.     PAST HISTORY  --------------------------------------------------------------------------------  No significant changes to PMH, PSH, FHx, SHx, unless otherwise noted    ALLERGIES & MEDICATIONS  --------------------------------------------------------------------------------  Allergies    No Known Allergies    Intolerances    Standing Inpatient Medications  ampicillin/sulbactam  IVPB 1.5 Gram(s) IV Intermittent every 12 hours  atorvastatin 40 milliGRAM(s) Oral at bedtime  cyanocobalamin 1000 MICROGram(s) Oral daily  dextrose 5%. 1000 milliLiter(s) IV Continuous <Continuous>  dextrose 50% Injectable 12.5 Gram(s) IV Push once  dextrose 50% Injectable 25 Gram(s) IV Push once  dextrose 50% Injectable 25 Gram(s) IV Push once  heparin  Injectable 5000 Unit(s) SubCutaneous every 8 hours  influenza   Vaccine 0.5 milliLiter(s) IntraMuscular once  insulin lispro (HumaLOG) corrective regimen sliding scale   SubCutaneous three times a day before meals  labetalol 200 milliGRAM(s) Oral three times a day  lactobacillus acidophilus 1 Tablet(s) Oral daily  NIFEdipine XL 30 milliGRAM(s) Oral daily  silver sulfADIAZINE 1% Cream 1 Application(s) Topical daily  sodium bicarbonate 650 milliGRAM(s) Oral three times a day    REVIEW OF SYSTEMS  --------------------------------------------------------------------------------  General: no fever  CVS: no chest pain  RESP: no sob, no cough  ABD: no abdominal pain, + diarrhea   : no dysuria  MSK: no edema     VITALS/PHYSICAL EXAM  --------------------------------------------------------------------------------  T(C): 37.6 (02-17-18 @ 05:21), Max: 37.6 (02-17-18 @ 05:21)  HR: 93 (02-17-18 @ 05:21) (78 - 93)  BP: 154/66 (02-17-18 @ 05:21) (142/65 - 154/66)  RR: 18 (02-17-18 @ 05:21) (18 - 20)  SpO2: 99% (02-17-18 @ 05:21) (93% - 99%)  Wt(kg): --    Physical Exam:  	Gen: Resting in bed   	HEENT: No JVD  	Pulm: CTA B/L  	CV: S1S2+  	Abd:  soft  	LE: Right BKA;  left foot dressing seen   	Neuro: Awake and alert   	Psych: Normal affect and mood  	Skin: Warm    LABS/STUDIES  --------------------------------------------------------------------------------              8.1    9.58  >-----------<  241      [02-17-18 @ 07:00]              25.1     143  |  108  |  60  ----------------------------<  99      [02-17-18 @ 07:00]  4.4   |  15  |  4.80        Ca     8.7     [02-17-18 @ 07:00]    TPro  7.4  /  Alb  3.2  /  TBili  0.2  /  DBili  x   /  AST  24  /  ALT  30  /  AlkPhos  105  [02-17-18 @ 07:00]    Creatinine Trend:  SCr 4.80 [02-17 @ 07:00]  SCr 4.56 [02-16 @ 06:30]  SCr 4.11 [02-15 @ 07:20]  SCr 3.89 [02-14 @ 06:00]  SCr 3.71 [02-13 @ 07:11]

## 2018-02-18 DIAGNOSIS — R71.0 PRECIPITOUS DROP IN HEMATOCRIT: ICD-10-CM

## 2018-02-18 LAB
ALBUMIN SERPL ELPH-MCNC: 2.9 G/DL — LOW (ref 3.3–5)
ALP SERPL-CCNC: 90 U/L — SIGNIFICANT CHANGE UP (ref 40–120)
ALT FLD-CCNC: 29 U/L — SIGNIFICANT CHANGE UP (ref 4–41)
AST SERPL-CCNC: 30 U/L — SIGNIFICANT CHANGE UP (ref 4–40)
BASOPHILS # BLD AUTO: 0.05 K/UL — SIGNIFICANT CHANGE UP (ref 0–0.2)
BASOPHILS NFR BLD AUTO: 0.6 % — SIGNIFICANT CHANGE UP (ref 0–2)
BILIRUB SERPL-MCNC: 0.2 MG/DL — SIGNIFICANT CHANGE UP (ref 0.2–1.2)
BLD GP AB SCN SERPL QL: NEGATIVE — SIGNIFICANT CHANGE UP
BUN SERPL-MCNC: 58 MG/DL — HIGH (ref 7–23)
CALCIUM SERPL-MCNC: 7.8 MG/DL — LOW (ref 8.4–10.5)
CHLORIDE SERPL-SCNC: 108 MMOL/L — HIGH (ref 98–107)
CO2 SERPL-SCNC: 17 MMOL/L — LOW (ref 22–31)
CREAT SERPL-MCNC: 4.74 MG/DL — HIGH (ref 0.5–1.3)
EOSINOPHIL # BLD AUTO: 0.3 K/UL — SIGNIFICANT CHANGE UP (ref 0–0.5)
EOSINOPHIL NFR BLD AUTO: 3.8 % — SIGNIFICANT CHANGE UP (ref 0–6)
GLUCOSE BLDC GLUCOMTR-MCNC: 124 MG/DL — HIGH (ref 70–99)
GLUCOSE BLDC GLUCOMTR-MCNC: 131 MG/DL — HIGH (ref 70–99)
GLUCOSE BLDC GLUCOMTR-MCNC: 189 MG/DL — HIGH (ref 70–99)
GLUCOSE BLDC GLUCOMTR-MCNC: 79 MG/DL — SIGNIFICANT CHANGE UP (ref 70–99)
GLUCOSE SERPL-MCNC: 89 MG/DL — SIGNIFICANT CHANGE UP (ref 70–99)
HCT VFR BLD CALC: 22.4 % — LOW (ref 39–50)
HCT VFR BLD CALC: 26 % — LOW (ref 39–50)
HGB BLD-MCNC: 7 G/DL — CRITICAL LOW (ref 13–17)
HGB BLD-MCNC: 8.3 G/DL — LOW (ref 13–17)
IMM GRANULOCYTES # BLD AUTO: 0.13 # — SIGNIFICANT CHANGE UP
IMM GRANULOCYTES NFR BLD AUTO: 1.6 % — HIGH (ref 0–1.5)
LYMPHOCYTES # BLD AUTO: 1.36 K/UL — SIGNIFICANT CHANGE UP (ref 1–3.3)
LYMPHOCYTES # BLD AUTO: 17 % — SIGNIFICANT CHANGE UP (ref 13–44)
MCHC RBC-ENTMCNC: 26.8 PG — LOW (ref 27–34)
MCHC RBC-ENTMCNC: 27.2 PG — SIGNIFICANT CHANGE UP (ref 27–34)
MCHC RBC-ENTMCNC: 31.3 % — LOW (ref 32–36)
MCHC RBC-ENTMCNC: 31.9 % — LOW (ref 32–36)
MCV RBC AUTO: 83.9 FL — SIGNIFICANT CHANGE UP (ref 80–100)
MCV RBC AUTO: 87.2 FL — SIGNIFICANT CHANGE UP (ref 80–100)
MONOCYTES # BLD AUTO: 1.19 K/UL — HIGH (ref 0–0.9)
MONOCYTES NFR BLD AUTO: 14.9 % — HIGH (ref 2–14)
NEUTROPHILS # BLD AUTO: 4.96 K/UL — SIGNIFICANT CHANGE UP (ref 1.8–7.4)
NEUTROPHILS NFR BLD AUTO: 62.1 % — SIGNIFICANT CHANGE UP (ref 43–77)
NRBC # FLD: 0 — SIGNIFICANT CHANGE UP
NRBC # FLD: 0 — SIGNIFICANT CHANGE UP
PLATELET # BLD AUTO: 230 K/UL — SIGNIFICANT CHANGE UP (ref 150–400)
PLATELET # BLD AUTO: 256 K/UL — SIGNIFICANT CHANGE UP (ref 150–400)
PMV BLD: 11.5 FL — SIGNIFICANT CHANGE UP (ref 7–13)
PMV BLD: 11.6 FL — SIGNIFICANT CHANGE UP (ref 7–13)
POTASSIUM SERPL-MCNC: 4.5 MMOL/L — SIGNIFICANT CHANGE UP (ref 3.5–5.3)
POTASSIUM SERPL-SCNC: 4.5 MMOL/L — SIGNIFICANT CHANGE UP (ref 3.5–5.3)
PROT SERPL-MCNC: 6.6 G/DL — SIGNIFICANT CHANGE UP (ref 6–8.3)
RBC # BLD: 2.57 M/UL — LOW (ref 4.2–5.8)
RBC # BLD: 3.1 M/UL — LOW (ref 4.2–5.8)
RBC # FLD: 14.2 % — SIGNIFICANT CHANGE UP (ref 10.3–14.5)
RBC # FLD: 14.8 % — HIGH (ref 10.3–14.5)
RH IG SCN BLD-IMP: POSITIVE — SIGNIFICANT CHANGE UP
RH IG SCN BLD-IMP: POSITIVE — SIGNIFICANT CHANGE UP
SODIUM SERPL-SCNC: 141 MMOL/L — SIGNIFICANT CHANGE UP (ref 135–145)
WBC # BLD: 7.99 K/UL — SIGNIFICANT CHANGE UP (ref 3.8–10.5)
WBC # BLD: 8.94 K/UL — SIGNIFICANT CHANGE UP (ref 3.8–10.5)
WBC # FLD AUTO: 7.99 K/UL — SIGNIFICANT CHANGE UP (ref 3.8–10.5)
WBC # FLD AUTO: 8.94 K/UL — SIGNIFICANT CHANGE UP (ref 3.8–10.5)

## 2018-02-18 PROCEDURE — 99233 SBSQ HOSP IP/OBS HIGH 50: CPT | Mod: GC

## 2018-02-18 PROCEDURE — 99232 SBSQ HOSP IP/OBS MODERATE 35: CPT

## 2018-02-18 RX ADMIN — Medication 650 MILLIGRAM(S): at 14:04

## 2018-02-18 RX ADMIN — Medication 1: at 17:50

## 2018-02-18 RX ADMIN — Medication 650 MILLIGRAM(S): at 05:17

## 2018-02-18 RX ADMIN — Medication 200 MILLIGRAM(S): at 05:17

## 2018-02-18 RX ADMIN — AMPICILLIN SODIUM AND SULBACTAM SODIUM 100 GRAM(S): 250; 125 INJECTION, POWDER, FOR SUSPENSION INTRAMUSCULAR; INTRAVENOUS at 05:10

## 2018-02-18 RX ADMIN — Medication 650 MILLIGRAM(S): at 21:06

## 2018-02-18 RX ADMIN — Medication 30 MILLIGRAM(S): at 05:17

## 2018-02-18 RX ADMIN — Medication 1 APPLICATION(S): at 14:04

## 2018-02-18 RX ADMIN — Medication 200 MILLIGRAM(S): at 21:06

## 2018-02-18 RX ADMIN — Medication 1 TABLET(S): at 14:04

## 2018-02-18 RX ADMIN — PREGABALIN 1000 MICROGRAM(S): 225 CAPSULE ORAL at 14:04

## 2018-02-18 RX ADMIN — HEPARIN SODIUM 5000 UNIT(S): 5000 INJECTION INTRAVENOUS; SUBCUTANEOUS at 05:17

## 2018-02-18 RX ADMIN — Medication 200 MILLIGRAM(S): at 14:05

## 2018-02-18 RX ADMIN — ATORVASTATIN CALCIUM 40 MILLIGRAM(S): 80 TABLET, FILM COATED ORAL at 21:06

## 2018-02-18 RX ADMIN — AMPICILLIN SODIUM AND SULBACTAM SODIUM 100 GRAM(S): 250; 125 INJECTION, POWDER, FOR SUSPENSION INTRAMUSCULAR; INTRAVENOUS at 19:15

## 2018-02-18 RX ADMIN — Medication 100 MILLIGRAM(S): at 14:12

## 2018-02-18 NOTE — PROGRESS NOTE ADULT - SUBJECTIVE AND OBJECTIVE BOX
Patient is a 63y old  Male who presents with a chief complaint of 63M PMH DM, HTN, CKD p/w L foot pain x 3 days. (12 Feb 2018 19:33)      Vascular Surgery Attending Progress Note    Interval HPI: pt w/o new c/o    Medications:  acetaminophen   Tablet 650 milliGRAM(s) Oral every 6 hours PRN  acetaminophen   Tablet. 650 milliGRAM(s) Oral every 6 hours PRN  ampicillin/sulbactam  IVPB 1.5 Gram(s) IV Intermittent every 12 hours  atorvastatin 40 milliGRAM(s) Oral at bedtime  benzonatate 100 milliGRAM(s) Oral every 6 hours PRN  cyanocobalamin 1000 MICROGram(s) Oral daily  dextrose 5%. 1000 milliLiter(s) IV Continuous <Continuous>  dextrose 50% Injectable 12.5 Gram(s) IV Push once  dextrose 50% Injectable 25 Gram(s) IV Push once  dextrose 50% Injectable 25 Gram(s) IV Push once  dextrose Gel 1 Dose(s) Oral once PRN  glucagon  Injectable 1 milliGRAM(s) IntraMuscular once PRN  influenza   Vaccine 0.5 milliLiter(s) IntraMuscular once  insulin lispro (HumaLOG) corrective regimen sliding scale   SubCutaneous three times a day before meals  labetalol 200 milliGRAM(s) Oral three times a day  lactobacillus acidophilus 1 Tablet(s) Oral daily  NIFEdipine XL 30 milliGRAM(s) Oral daily  silver sulfADIAZINE 1% Cream 1 Application(s) Topical daily  sodium bicarbonate 650 milliGRAM(s) Oral three times a day      Vital Signs Last 24 Hrs  T(C): 37.3 (18 Feb 2018 15:58), Max: 37.4 (17 Feb 2018 21:29)  T(F): 99.1 (18 Feb 2018 15:58), Max: 99.4 (17 Feb 2018 21:29)  HR: 76 (18 Feb 2018 15:58) (72 - 84)  BP: 150/64 (18 Feb 2018 15:58) (149/49 - 153/63)  BP(mean): --  RR: 18 (18 Feb 2018 15:58) (18 - 19)  SpO2: 96% (18 Feb 2018 15:58) (92% - 96%)  I&O's Summary    17 Feb 2018 07:01  -  18 Feb 2018 07:00  --------------------------------------------------------  IN: 410 mL / OUT: 250 mL / NET: 160 mL    18 Feb 2018 07:01  -  18 Feb 2018 16:57  --------------------------------------------------------  IN: 0 mL / OUT: 600 mL / NET: -600 mL        Physical Exam:  Neuro  A&Ox3 VSS  Vascular: stable left toe 1 wound stable   Musculoskeletal: wnl    LABS:                        7.0    7.99  )-----------( 230      ( 18 Feb 2018 06:15 )             22.4     02-18    141  |  108<H>  |  58<H>  ----------------------------<  89  4.5   |  17<L>  |  4.74<H>    Ca    7.8<L>      18 Feb 2018 06:15    TPro  6.6  /  Alb  2.9<L>  /  TBili  0.2  /  DBili  x   /  AST  30  /  ALT  29  /  AlkPhos  90  02-18        BARI NICOLAS MD  [phone]

## 2018-02-18 NOTE — PROGRESS NOTE ADULT - PROBLEM SELECTOR PLAN 2
-JANY on evolving ATN on CKD stage III  -Patient still urinating  -Renal consult appreciated. Renal u/s negative. Spun down urine demonstrating granular casts suggesting ATN. Baseline creatinine is 2.3.   -Awaiting creatinine stabilization (not yet, currently worsening)  -Avoiding nephrotoxins and RCA.  -Increased sodium bicarb to 650 TID as trending down.

## 2018-02-18 NOTE — PROGRESS NOTE ADULT - PROBLEM SELECTOR PLAN 1
Pt. with JANY on CKD in the setting of infection and diarrhea. CKD in the setting of long-standing DM. Scr was 1.5 in 3/2017, increased to 2.30 in 7/2017. Scr on admission (2/12) was elevated at 3.81. Pt with elevated however stable SCr at 4.7 today.  Monitor BMP and urine  output. Avoid any potential nephrotoxins Pt. with JANY on CKD in the setting of infection and diarrhea. CKD in the setting of long-standing DM. Scr was 1.5 in 3/2017, increased to 2.30 in 7/2017. Scr on admission (2/12) was elevated at 3.81. Scr elevated however stable at 4.7 today. Monitor BMP and urine  output. Avoid any potential nephrotoxins

## 2018-02-18 NOTE — PROGRESS NOTE ADULT - PROBLEM SELECTOR PLAN 5
-Improved today. RVP negative. Satting well on room air.  -CXR concerning for fluid overload. Bedside ultrasound confirming. No increased work of breathing.  -Tessalon perles

## 2018-02-18 NOTE — PROGRESS NOTE ADULT - ATTENDING COMMENTS
Patient seen and examined.  Case discussed with house staff.  Agree with above as edited.  Patient with left foot infection with severe sepsis and lactic acidosis with JANY on CKD III.  Vascular, podiatry, nephrology and infectious disease input appreciated.  Treating polymicrobial cellulitis with IV unasyn based on sensitivities  Anemia of CKD - no s/s of acute blood loss - patient had diarrhea which is improving but denies melena or hematochezia. Will tx 1upRBCs and monitor h/h.  Diarrhea, r/o C-diff - Improving - Stool cdiff ordered. Suspect antibiotic associated diarrhea  JANY on CKD III - ATN from underlying sepsis -  trending cr.  Appreciate vasc recs - will defer angio until Cr stable and ok with renal

## 2018-02-18 NOTE — PROGRESS NOTE ADULT - SUBJECTIVE AND OBJECTIVE BOX
[facility] DIVISION OF KIDNEY DISEASES AND HYPERTENSION -- [phone]   FOLLOW UP NOTE  --------------------------------------------------------------------------------  HPI:  62 y/o male with PMH of HTN, DM, right BKA, CKD admitted for left foot infection. As per review of labs on [place]/[employer], Scr was 1.51 in 3/2017. As her pt. PMD's office, Scr checked in 7/2017 was 2.30.  Labs on admission (2/12) showed elevated Scr of 3.8.  Pt. currently resting in bed and denies any SOB, CP, N/V.     PAST HISTORY  --------------------------------------------------------------------------------  No significant changes to PMH, PSH, FHx, SHx, unless otherwise noted    ALLERGIES & MEDICATIONS  --------------------------------------------------------------------------------  Allergies    No Known Allergies    Intolerances      Standing Inpatient Medications  ampicillin/sulbactam  IVPB 1.5 Gram(s) IV Intermittent every 12 hours  atorvastatin 40 milliGRAM(s) Oral at bedtime  cyanocobalamin 1000 MICROGram(s) Oral daily  dextrose 5%. 1000 milliLiter(s) IV Continuous <Continuous>  dextrose 50% Injectable 12.5 Gram(s) IV Push once  dextrose 50% Injectable 25 Gram(s) IV Push once  dextrose 50% Injectable 25 Gram(s) IV Push once  heparin  Injectable 5000 Unit(s) SubCutaneous every 8 hours  influenza   Vaccine 0.5 milliLiter(s) IntraMuscular once  insulin lispro (HumaLOG) corrective regimen sliding scale   SubCutaneous three times a day before meals  labetalol 200 milliGRAM(s) Oral three times a day  lactobacillus acidophilus 1 Tablet(s) Oral daily  NIFEdipine XL 30 milliGRAM(s) Oral daily  silver sulfADIAZINE 1% Cream 1 Application(s) Topical daily  sodium bicarbonate 650 milliGRAM(s) Oral three times a day    PRN Inpatient Medications  acetaminophen   Tablet 650 milliGRAM(s) Oral every 6 hours PRN  acetaminophen   Tablet. 650 milliGRAM(s) Oral every 6 hours PRN  benzonatate 100 milliGRAM(s) Oral every 6 hours PRN  dextrose Gel 1 Dose(s) Oral once PRN  glucagon  Injectable 1 milliGRAM(s) IntraMuscular once PRN      REVIEW OF SYSTEMS  --------------------------------------------------------------------------------  General: no fever  CVS: no chest pain  RESP: no sob, no cough  ABD: no abdominal pain  : no dysuria,  MSK: no edema     VITALS/PHYSICAL EXAM  --------------------------------------------------------------------------------  T(C): 37.2 (02-18-18 @ 05:04), Max: 37.6 (02-17-18 @ 12:48)  HR: 77 (02-18-18 @ 05:04) (72 - 84)  BP: 152/55 (02-18-18 @ 05:04) (149/49 - 152/55)  RR: 18 (02-18-18 @ 05:04) (18 - 19)  SpO2: 95% (02-18-18 @ 05:04) (93% - 95%)  Wt(kg): --        02-17-18 @ 07:01  -  02-18-18 @ 07:00  --------------------------------------------------------  IN: 410 mL / OUT: 250 mL / NET: 160 mL      Physical Exam:  	  Gen: Resting in bed   	HEENT: No JVD  	Pulm: CTA B/L  	CV: S1S2+  	Abd:  soft  	LE: Right BKA;  left foot dressing seen   	Neuro: Awake and alert   	Psych: Normal affect and mood  	Skin: Warm      LABS/STUDIES  --------------------------------------------------------------------------------              7.0    7.99  >-----------<  230      [02-18-18 @ 06:15]              22.4     141  |  108  |  58  ----------------------------<  89      [02-18-18 @ 06:15]  4.5   |  17  |  4.74        Ca     7.8     [02-18-18 @ 06:15]    TPro  6.6  /  Alb  2.9  /  TBili  0.2  /  DBili  x   /  AST  30  /  ALT  29  /  AlkPhos  90  [02-18-18 @ 06:15]          Creatinine Trend:  SCr 4.74 [02-18 @ 06:15]  SCr 4.80 [02-17 @ 07:00]  SCr 4.56 [02-16 @ 06:30]  SCr 4.11 [02-15 @ 07:20]  SCr 3.89 [02-14 @ 06:00]        HbA1c 5.9      [02-13-18 @ 07:11] [facility] DIVISION OF KIDNEY DISEASES AND HYPERTENSION -- [phone]   FOLLOW UP NOTE  --------------------------------------------------------------------------------  HPI: 63-year-old male with PMH of HTN, DM, right BKA, and CKD admitted for left foot infection. As per review of labs on [place]/[employer], Scr was 1.51 in 3/2017. As her pt. PMD's office, Scr checked in 7/2017 was 2.30.  Labs on admission (2/12) showed elevated Scr of 3.8.  Pt. currently resting in bed and denies any SOB, CP, N/V.     PAST HISTORY  --------------------------------------------------------------------------------  No significant changes to PMH, PSH, FHx, SHx, unless otherwise noted    ALLERGIES & MEDICATIONS  --------------------------------------------------------------------------------  Allergies    No Known Allergies    Intolerances    Standing Inpatient Medications  ampicillin/sulbactam  IVPB 1.5 Gram(s) IV Intermittent every 12 hours  atorvastatin 40 milliGRAM(s) Oral at bedtime  cyanocobalamin 1000 MICROGram(s) Oral daily  dextrose 5%. 1000 milliLiter(s) IV Continuous <Continuous>  dextrose 50% Injectable 12.5 Gram(s) IV Push once  dextrose 50% Injectable 25 Gram(s) IV Push once  dextrose 50% Injectable 25 Gram(s) IV Push once  heparin  Injectable 5000 Unit(s) SubCutaneous every 8 hours  influenza   Vaccine 0.5 milliLiter(s) IntraMuscular once  insulin lispro (HumaLOG) corrective regimen sliding scale   SubCutaneous three times a day before meals  labetalol 200 milliGRAM(s) Oral three times a day  lactobacillus acidophilus 1 Tablet(s) Oral daily  NIFEdipine XL 30 milliGRAM(s) Oral daily  silver sulfADIAZINE 1% Cream 1 Application(s) Topical daily  sodium bicarbonate 650 milliGRAM(s) Oral three times a day    REVIEW OF SYSTEMS  --------------------------------------------------------------------------------  General: no fever  CVS: no chest pain  RESP: no sob, no cough  ABD: no abdominal pain  : no dysuria  MSK: no edema     VITALS/PHYSICAL EXAM  --------------------------------------------------------------------------------  T(C): 37.2 (02-18-18 @ 05:04), Max: 37.6 (02-17-18 @ 12:48)  HR: 77 (02-18-18 @ 05:04) (72 - 84)  BP: 152/55 (02-18-18 @ 05:04) (149/49 - 152/55)  RR: 18 (02-18-18 @ 05:04) (18 - 19)  SpO2: 95% (02-18-18 @ 05:04) (93% - 95%)  Wt(kg): --    02-17-18 @ 07:01  -  02-18-18 @ 07:00  --------------------------------------------------------  IN: 410 mL / OUT: 250 mL / NET: 160 mL    Physical Exam:	              Gen: Resting in bed   	HEENT: No JVD  	Pulm: CTA B/L  	CV: S1S2+  	Abd:  soft  	LE: Right BKA;  left foot dressing seen   	Neuro: Awake and alert   	Psych: Normal affect and mood  	Skin: Warm    LABS/STUDIES  --------------------------------------------------------------------------------              7.0    7.99  >-----------<  230      [02-18-18 @ 06:15]              22.4     141  |  108  |  58  ----------------------------<  89      [02-18-18 @ 06:15]  4.5   |  17  |  4.74        Ca     7.8     [02-18-18 @ 06:15]    TPro  6.6  /  Alb  2.9  /  TBili  0.2  /  DBili  x   /  AST  30  /  ALT  29  /  AlkPhos  90  [02-18-18 @ 06:15]    Creatinine Trend:  SCr 4.74 [02-18 @ 06:15]  SCr 4.80 [02-17 @ 07:00]  SCr 4.56 [02-16 @ 06:30]  SCr 4.11 [02-15 @ 07:20]  SCr 3.89 [02-14 @ 06:00]

## 2018-02-18 NOTE — PROGRESS NOTE ADULT - ASSESSMENT
64 y/o male with PMH of HTN, DM, right BKA and CKD admitted with left foot infection. Pt. now with JANY.

## 2018-02-18 NOTE — PROGRESS NOTE ADULT - SUBJECTIVE AND OBJECTIVE BOX
Medicine Progress Note- PGY2    =========================================  CONTACT INFO  Ari Ramsay M.D., PGY-2  Pager: [phone]  NS: [phone]    Chief Complaint: Patient is a 63y old  Male who presents with a chief complaint of 63M PMH DM, HTN, CKD p/w L foot pain x 3 days. (2018 19:33)      INTERVAL HPI/OVERNIGHT EVENTS: C/o diarrhea. C. diff pending. Creatinien still elevated. H/h 7.0 this AM: Denies any overt signs of bleeding.    REVIEW OF SYSTEMS:   CONSTITUTIONAL:  Denies f nv chills  HEENT:  Eyes:  Denies visual loss, blurred vision, double vision or scleral icterus. Ears, Nose, Throat:  Denies hearing loss, sneezing, congestion, runny nose or sore throat.  SKIN:  Denies rash or itching.  CARDIOVASCULAR:  Denies chest pain, FLORECITA  RESPIRATORY:  Denies shortness of breath, cough or sputum.  GASTROINTESTINAL:  Denies anorexia, nausea, vomiting or diarrhea. No abdominal pain or blood.  GENITOURINARY:  Denies any hematuria, dysuria  NEUROLOGICAL:  Denies headache, dizziness, syncope, paralysis, ataxia, numbness or tingling in the extremities. No change in bowel or bladder control.  MUSCULOSKELETAL:  Denies muscle, back pain, joint pain or stiffness.  HEMATOLOGIC:  Denies anemia, bleeding or bruising.  LYMPHATICS:  Denies enlarged nodes.   PSYCHIATRIC:  Denies history of depression or anxiety.  ENDOCRINOLOGIC:  Denies reports of sweating, cold or heat intolerance. No polyuria or polydipsia.  ALLERGIES:  Denies history of asthma, hives, eczema or rhinitis.    MEDICATIONS  (STANDING):  ampicillin/sulbactam  IVPB 1.5 Gram(s) IV Intermittent every 12 hours  atorvastatin 40 milliGRAM(s) Oral at bedtime  cyanocobalamin 1000 MICROGram(s) Oral daily  dextrose 5%. 1000 milliLiter(s) (50 mL/Hr) IV Continuous <Continuous>  dextrose 50% Injectable 12.5 Gram(s) IV Push once  dextrose 50% Injectable 25 Gram(s) IV Push once  dextrose 50% Injectable 25 Gram(s) IV Push once  heparin  Injectable 5000 Unit(s) SubCutaneous every 8 hours  influenza   Vaccine 0.5 milliLiter(s) IntraMuscular once  insulin lispro (HumaLOG) corrective regimen sliding scale   SubCutaneous three times a day before meals  labetalol 200 milliGRAM(s) Oral three times a day  lactobacillus acidophilus 1 Tablet(s) Oral daily  NIFEdipine XL 30 milliGRAM(s) Oral daily  silver sulfADIAZINE 1% Cream 1 Application(s) Topical daily  sodium bicarbonate 650 milliGRAM(s) Oral three times a day    MEDICATIONS  (PRN):  acetaminophen   Tablet 650 milliGRAM(s) Oral every 6 hours PRN For Temp greater than 38 C (100.4 F)  acetaminophen   Tablet. 650 milliGRAM(s) Oral every 6 hours PRN Moderate Pain (4 - 6)  benzonatate 100 milliGRAM(s) Oral every 6 hours PRN Cough  dextrose Gel 1 Dose(s) Oral once PRN Blood Glucose LESS THAN 70 milliGRAM(s)/deciliter  glucagon  Injectable 1 milliGRAM(s) IntraMuscular once PRN Glucose LESS THAN 70 milligrams/deciliter      Vital Signs Last 24 Hrs  T(C): 37.2 (2018 05:04), Max: 37.6 (2018 12:48)  T(F): 99 (2018 05:04), Max: 99.6 (2018 12:48)  HR: 77 (2018 05:04) (72 - 84)  BP: 152/55 (2018 05:04) (149/49 - 152/55)  BP(mean): --  RR: 18 (2018 05:04) (18 - 19)  SpO2: 95% (2018 05:04) (93% - 95%)  Supplemental O2: [ ] No, on Room Air [ ] Yes,     I&O's Detail    2018 07:01  -  2018 07:00  --------------------------------------------------------  IN:    IV PiggyBack: 50 mL    Oral Fluid: 360 mL  Total IN: 410 mL    OUT:    Voided: 250 mL  Total OUT: 250 mL    Total NET: 160 mL        CAPILLARY BLOOD GLUCOSE      POCT Blood Glucose.: 79 mg/dL (2018 08:29)  POCT Blood Glucose.: 144 mg/dL (2018 21:41)  POCT Blood Glucose.: 185 mg/dL (2018 17:26)  POCT Blood Glucose.: 187 mg/dL (2018 11:59)      PHYSICAL EXAM:  Daily     Daily Weight in k.4 (2018 05:04)   GENERAL: NAD,   HEAD:  NC/AT  EYES: EOMI, white sclerae  ENMT: MMM, no oropharyngeal lesions or erythema appreciated, dentition well appearing  Neck: trachea midline, no appreciable masses  Pulm: normal work of breathing, CTA b/l  CV: S1&S2+, rrr, no m/r/g appreciated  ABDOMEN: soft, nt, nd,   : no cva tenderness, no cervantes placed  EXTREMITIES:  no appreciable edema  Neuro: A&Ox3, no focal deficits  SKIN: warm and dry, no visible rash    LABS:                        7.0    7.99  )-----------( 230      ( 2018 06:15 )             22.4     02-18    141  |  108<H>  |  58<H>  ----------------------------<  89  4.5   |  17<L>  |  4.74<H>    Ca    7.8<L>      2018 06:15    TPro  6.6  /  Alb  2.9<L>  /  TBili  0.2  /  DBili  x   /  AST  30  /  ALT  29  /  AlkPhos  90  02-18    LIVER FUNCTIONS - ( 2018 06:15 )  Alb: 2.9 g/dL / Pro: 6.6 g/dL / ALK PHOS: 90 u/L / ALT: 29 u/L / AST: 30 u/L / GGT: x     / T. Bili 0.2 mg/dL / D. Bili x                     Microbiology:    RADIOLOGY & ADDITIONAL TESTS:

## 2018-02-18 NOTE — PROGRESS NOTE ADULT - PROBLEM SELECTOR PLAN 6
-Started on home medications.  -Labetalol increased to 200mg TID as BP still uncontrolled; continuing on Nifedipine 30mg XL.

## 2018-02-18 NOTE — PROGRESS NOTE ADULT - PROBLEM SELECTOR PLAN 3
H/h 7.0 this AM. Type and screen obtained. Consent obtained (patient unable to physically sign 2/2 to blindness. Verbal consent obtained. Will transfuse 1 unit pRBC. Send FOBT.

## 2018-02-18 NOTE — PROGRESS NOTE ADULT - SUBJECTIVE AND OBJECTIVE BOX
VASCULAR SURGERY    Patient is a 63 year old M  with a L diabetic foot infection in need of angiography.  Discussed with team today the need for renal optimization prior to angiography.  Tentatively planning for Tuesday 2/20/17.    Vascular Surgery [id number]

## 2018-02-18 NOTE — PROGRESS NOTE ADULT - ASSESSMENT
62yo M with DM and R BKA, now presenting with Diabetic foot soft tissue infection. No evidence of necrotizing fasciitis or systemic infection.     - Wound care as per podiatry   lle angio tent rescheduled for tue to allow optimazation by renal  await renal clearance for lle angio  will follow

## 2018-02-18 NOTE — PROGRESS NOTE ADULT - PROBLEM SELECTOR PLAN 1
-L foot with cellulitis with resulting sepsis.  -ID is following; recs appreciated. S/p vancomycin and zosyn. Started on Unasyn per ID through 2/22.  -Podiatry has evaluated patient and report good improvement. recs appreciated.   -Surgery has been consulted and are following. Want an angiogram; will obtain once creatinine stabilizes  -Wound culture growing Acenitobacter, corynebacterium, staph haemolyticus and staph aureus. Blood cx with NGTD.  -LLE xrays negative for free air; CT read negative for free air.

## 2018-02-18 NOTE — PROGRESS NOTE ADULT - ASSESSMENT
63M PMH DM on januvia, FS 80s at home, HTN, CKD, R BKA in 2009 presents with 3 day history of worsening foot pain concerning for cellulitis vs osteo, currently stable, now with diarrhea and anemic

## 2018-02-19 ENCOUNTER — TRANSCRIPTION ENCOUNTER (OUTPATIENT)
Age: 63
End: 2018-02-19

## 2018-02-19 LAB
ALBUMIN SERPL ELPH-MCNC: 3.1 G/DL — LOW (ref 3.3–5)
ALP SERPL-CCNC: 100 U/L — SIGNIFICANT CHANGE UP (ref 40–120)
ALT FLD-CCNC: 46 U/L — HIGH (ref 4–41)
APTT BLD: 31.2 SEC — SIGNIFICANT CHANGE UP (ref 27.5–37.4)
AST SERPL-CCNC: 39 U/L — SIGNIFICANT CHANGE UP (ref 4–40)
BILIRUB SERPL-MCNC: 0.3 MG/DL — SIGNIFICANT CHANGE UP (ref 0.2–1.2)
BUN SERPL-MCNC: 59 MG/DL — HIGH (ref 7–23)
CALCIUM SERPL-MCNC: 8.7 MG/DL — SIGNIFICANT CHANGE UP (ref 8.4–10.5)
CHLORIDE SERPL-SCNC: 109 MMOL/L — HIGH (ref 98–107)
CO2 SERPL-SCNC: 18 MMOL/L — LOW (ref 22–31)
CREAT SERPL-MCNC: 5.08 MG/DL — HIGH (ref 0.5–1.3)
GLUCOSE BLDC GLUCOMTR-MCNC: 110 MG/DL — HIGH (ref 70–99)
GLUCOSE BLDC GLUCOMTR-MCNC: 141 MG/DL — HIGH (ref 70–99)
GLUCOSE BLDC GLUCOMTR-MCNC: 91 MG/DL — SIGNIFICANT CHANGE UP (ref 70–99)
GLUCOSE SERPL-MCNC: 92 MG/DL — SIGNIFICANT CHANGE UP (ref 70–99)
HCT VFR BLD CALC: 26.2 % — LOW (ref 39–50)
HGB BLD-MCNC: 8.7 G/DL — LOW (ref 13–17)
INR BLD: 1.04 — SIGNIFICANT CHANGE UP (ref 0.88–1.17)
MCHC RBC-ENTMCNC: 28.7 PG — SIGNIFICANT CHANGE UP (ref 27–34)
MCHC RBC-ENTMCNC: 33.2 % — SIGNIFICANT CHANGE UP (ref 32–36)
MCV RBC AUTO: 86.5 FL — SIGNIFICANT CHANGE UP (ref 80–100)
NRBC # FLD: 0 — SIGNIFICANT CHANGE UP
PLATELET # BLD AUTO: 253 K/UL — SIGNIFICANT CHANGE UP (ref 150–400)
PMV BLD: 11.4 FL — SIGNIFICANT CHANGE UP (ref 7–13)
POTASSIUM SERPL-MCNC: 4.5 MMOL/L — SIGNIFICANT CHANGE UP (ref 3.5–5.3)
POTASSIUM SERPL-SCNC: 4.5 MMOL/L — SIGNIFICANT CHANGE UP (ref 3.5–5.3)
PROT SERPL-MCNC: 7.3 G/DL — SIGNIFICANT CHANGE UP (ref 6–8.3)
PROTHROM AB SERPL-ACNC: 12 SEC — SIGNIFICANT CHANGE UP (ref 9.8–13.1)
RBC # BLD: 3.03 M/UL — LOW (ref 4.2–5.8)
RBC # FLD: 15.1 % — HIGH (ref 10.3–14.5)
SODIUM SERPL-SCNC: 145 MMOL/L — SIGNIFICANT CHANGE UP (ref 135–145)
WBC # BLD: 8.39 K/UL — SIGNIFICANT CHANGE UP (ref 3.8–10.5)
WBC # FLD AUTO: 8.39 K/UL — SIGNIFICANT CHANGE UP (ref 3.8–10.5)

## 2018-02-19 PROCEDURE — 99233 SBSQ HOSP IP/OBS HIGH 50: CPT | Mod: GC

## 2018-02-19 PROCEDURE — 99232 SBSQ HOSP IP/OBS MODERATE 35: CPT

## 2018-02-19 RX ORDER — LABETALOL HCL 100 MG
300 TABLET ORAL THREE TIMES A DAY
Refills: 0 | Status: DISCONTINUED | OUTPATIENT
Start: 2018-02-19 | End: 2018-03-11

## 2018-02-19 RX ADMIN — Medication 300 MILLIGRAM(S): at 21:20

## 2018-02-19 RX ADMIN — ATORVASTATIN CALCIUM 40 MILLIGRAM(S): 80 TABLET, FILM COATED ORAL at 21:18

## 2018-02-19 RX ADMIN — Medication 1 APPLICATION(S): at 13:20

## 2018-02-19 RX ADMIN — Medication 200 MILLIGRAM(S): at 05:33

## 2018-02-19 RX ADMIN — Medication 100 MILLIGRAM(S): at 13:20

## 2018-02-19 RX ADMIN — PREGABALIN 1000 MICROGRAM(S): 225 CAPSULE ORAL at 13:20

## 2018-02-19 RX ADMIN — Medication 650 MILLIGRAM(S): at 05:33

## 2018-02-19 RX ADMIN — Medication 30 MILLIGRAM(S): at 05:33

## 2018-02-19 RX ADMIN — Medication 1 TABLET(S): at 13:20

## 2018-02-19 RX ADMIN — Medication 650 MILLIGRAM(S): at 21:18

## 2018-02-19 RX ADMIN — Medication 300 MILLIGRAM(S): at 14:51

## 2018-02-19 RX ADMIN — AMPICILLIN SODIUM AND SULBACTAM SODIUM 100 GRAM(S): 250; 125 INJECTION, POWDER, FOR SUSPENSION INTRAMUSCULAR; INTRAVENOUS at 05:33

## 2018-02-19 RX ADMIN — Medication 650 MILLIGRAM(S): at 13:20

## 2018-02-19 RX ADMIN — AMPICILLIN SODIUM AND SULBACTAM SODIUM 100 GRAM(S): 250; 125 INJECTION, POWDER, FOR SUSPENSION INTRAMUSCULAR; INTRAVENOUS at 17:46

## 2018-02-19 NOTE — PROGRESS NOTE ADULT - PROBLEM SELECTOR PLAN 1
Pt. with JANY on CKD in the setting of infection and diarrhea. CKD in the setting of long-standing DM. Scr was 1.5 in 3/2017, increased to 2.30 in 7/2017. Scr on admission (2/12) was elevated at 3.81. Scr elevated at 5.08 today. Monitor BMP and urine  output. Avoid any potential nephrotoxins Pt. with JANY on CKD in the setting of infection and diarrhea. CKD in the setting of long-standing DM. Scr was 1.5 in 3/2017, increased to 2.30 in 7/2017. Scr on admission (2/12) was elevated at 3.81. Scr has increased to 5.08 today. Monitor BMP and urine  output. Avoid any potential nephrotoxins

## 2018-02-19 NOTE — DISCHARGE NOTE ADULT - NS AS ACTIVITY OBS
No Heavy lifting/straining/Do not make important decisions/Do not drive or operate machinery/Walking-Indoors allowed/Walking-Outdoors allowed/Stairs allowed

## 2018-02-19 NOTE — DISCHARGE NOTE ADULT - HOSPITAL COURSE
63M PMH DM on januvia, FS 80s at home, HTN, CKD, R BKA in 2009 presented with 3 day history of worsening foot pain after having a blister on the ball of his foot pop. In ED, CRP found to be 110, ESR 96, WBC 10.94. Lactate 3.5. Creatinine 3.81. Patient admitted for cellulitis. Initially concerned for necrotizing fasciitis, but the lower extremity xrays and CT scan were negative for free air. Vascular surgery and podiatry evaluated patient. Patient was started on Vancomycin by level given his creatinine, Zosyn, and Clindamycin. Vascular surgery wanted to do an angiogram given concern for peripheral artery disease. 63M PMH DM on januvia, FS 80s at home, HTN, CKD, R BKA in 2009 presented with 3 day history of worsening foot pain after having a blister on the ball of his foot pop. In ED, CRP found to be 110, ESR 96, WBC 10.94. Lactate 3.5. Creatinine 3.81. Patient admitted for cellulitis. Initially concerned for necrotizing fasciitis, but the lower extremity xrays and CT scan were negative for free air. Vascular surgery and podiatry evaluated patient. Patient was started on Vancomycin by level given his creatinine, Zosyn, and Clindamycin. Vascular surgery wanted to do an angiogram given concern for peripheral artery disease, however it was noted that his creatinine was elevated above his baseline so the angiogram was deferred. Nephrology saw the patient and noted granular casts on urine microscopy, consistent with ATN in the setting of sepsis from the foot wound. Infectious disease also evaluated patient and discontinued the Clindamycin. Wound culture grew MSSA, acinetobacter, cns, and corynebacterium, and patient was started on Unasyn for a superinfection (vancomycin by level and Zosyn were discontinued) for a total of 10 days of antibiotics.   Patient's food continued to improve and patient was followed by podiatry for the entirety of his stay with daily dressing changes.  Patient's creatinine continued to rise, peaking at 6.3 and then went down to likely new baseline 4.3-4.6.   Patient's course was also complicated by fluid overload and several days of hypoxia requiring oxygen. Patient had B lines b/l on ultrasound and CXR concerning for pleural congestion. TTE was obtained: EF 68%, and findings consistent with mild diastolic dysfunction. Patient was started on Lasix 60mg BID with improvement of SOB and leg swelling.   Patient was taken for an angiogram on 3/8/18 after the risk of possible contrast induced nephropathy was explained to him extensively. He was prepped with NS IVF before the procedure and after. He was found to have extensive arterial disease in the L leg and femoral-popliteal bypass was scheduled for Monday 3/12.  Patient's blood pressure was elevated and his treatment was eventually increased to Labetalol 300mg TID and Nifedipine 60XL.  His finger sticks remained under control with sliding scale insulin. 63M PMH DM on januvia, FS 80s at home, HTN, CKD, R BKA in 2009 presented with 3 day history of worsening foot pain after having a blister on the ball of his foot pop. In ED, CRP found to be 110, ESR 96, WBC 10.94. Lactate 3.5. Creatinine 3.81. Patient admitted for cellulitis and JANY on CKD. Initially concerned for necrotizing fasciitis, but the lower extremity xrays and CT scan were negative for free air. Vascular surgery and podiatry evaluated patient. Patient was started on Vancomycin by level given his creatinine, Zosyn, and Clindamycin. Vascular surgery wanted to do an angiogram given concern for peripheral artery disease, however it was noted that his creatinine was elevated above his baseline so the angiogram was deferred. Nephrology saw the patient and noted granular casts on urine microscopy, consistent with ATN in the setting of sepsis from the foot wound. Infectious disease also evaluated patient and discontinued the Clindamycin. Wound culture grew MSSA, acinetobacter, cns, and corynebacterium, and patient was started on Unasyn for a superinfection (vancomycin by level and Zosyn were discontinued) for a total of 10 days of antibiotics.   Patient's food continued to improve and patient was followed by podiatry for the entirety of his stay with daily dressing changes.  Patient's creatinine continued to rise, peaking at 6.3 and then went down to likely new baseline 4.3-4.6.   Patient's course was also complicated by fluid overload and several days of hypoxia requiring oxygen. Patient had B lines b/l on ultrasound and CXR concerning for pleural congestion. TTE was obtained: EF 68%, and findings consistent with mild diastolic dysfunction. Patient was started on Lasix 60mg BID with improvement of SOB and leg swelling. Patient had one episode of CP with troponemia up to 0.1, which resolved. No changes on EKG. Patient was cleared by cardiology for angiogram and surgery.  Patient was taken for an angiogram on 3/8/18 after the risk of possible contrast induced nephropathy was explained to him extensively. He was prepped with NS IVF before the procedure and after. He was found to have extensive arterial disease in the L leg and femoral-popliteal bypass was scheduled for Monday 3/12.  Patient's blood pressure was elevated and his treatment was eventually increased to Labetalol 300mg TID and Nifedipine 60XL.  His finger sticks remained under control with sliding scale insulin. 63M PMH DM on januvia, FS 80s at home, HTN, CKD, R BKA in 2009 presented with 3 day history of worsening foot pain after having a blister on the ball of his foot pop. In ED, CRP found to be 110, ESR 96, WBC 10.94. Lactate 3.5. Creatinine 3.81. Patient admitted for cellulitis and JANY on CKD. Initially concerned for necrotizing fasciitis, but the lower extremity xrays and CT scan were negative for free air. Vascular surgery and podiatry evaluated patient. Patient was started on Vancomycin by level given his creatinine, Zosyn, and Clindamycin. Vascular surgery wanted to do an angiogram given concern for peripheral artery disease, however it was noted that his creatinine was elevated above his baseline so the angiogram was deferred. Nephrology saw the patient and noted granular casts on urine microscopy, consistent with ATN in the setting of sepsis from the foot wound. Infectious disease also evaluated patient and discontinued the Clindamycin. Wound culture grew MSSA, acinetobacter, cns, and corynebacterium, and patient was started on Unasyn for a superinfection (vancomycin by level and Zosyn were discontinued) for a total of 10 days of antibiotics.   Patient's food continued to improve and patient was followed by podiatry for the entirety of his stay with daily dressing changes.  Patient's creatinine continued to rise, peaking at 6.3 and then went down to likely new baseline 4.3-4.6.   Patient's course was also complicated by fluid overload and several days of hypoxia requiring oxygen. Patient had B lines b/l on ultrasound and CXR concerning for pleural congestion. TTE was obtained: EF 68%, and findings consistent with mild diastolic dysfunction. Patient was started on Lasix 60mg BID with improvement of SOB and leg swelling. Patient had one episode of CP with troponemia up to 0.1, which resolved. No changes on EKG. Patient was cleared by cardiology for angiogram and surgery.  Patient was taken for an angiogram on 3/8/18 after the risk of possible contrast induced nephropathy was explained to him extensively. He was prepped with NS IVF before the procedure and after. He was found to have extensive arterial disease in the L leg and femoral-popliteal bypass was scheduled for Monday 3/12.  Patient's blood pressure was elevated and his treatment was eventually increased to Labetalol 300mg TID and Nifedipine 60XL.  His finger sticks remained under control with sliding scale insulin.  Patient was transferred to Surgical Service on 3/12 from Medical team and scheduled for fem pop bypass on 3/15.   On day of surgery, patient was tachypneic and case was cancelled due to fluid overload. Anesthesia believed patient should be medically optimized and was at risk for prolonged intubation/trach after surgery if he was not dialyzed prior to the case.  Patient refused HD and said he had to think about it.  Nephrology following. Patient was diuresed.  CXR showed worsening effusions and a Chest CT was done which showed large bilateral pleural effusions and compressive atelectasis.  Pulmonary team was consulted and recommended aggressive diuresis with Bumex gtt and no need for thoracentesis at that time.  Patient was taken off Bumex and started on PO Lasix and did well.  According to Medical, Cardiology, Nephrology, patient is cleared for fem pop bypass.  Patient was taken to the OR on 3/29 and is status post left fem pop bypass with RSVG.  Post operatively, pain was well controlled. Patient required blood transfusion and also had episode of urinary retention and cervantes was replaced. He was also febrile on POD 2 and fever workup was done which was negative. Renal ultrasound was also done due to rise in creatinine which was negative. Podiatry continued wound care and advised he will need close monitoring of foot wound as outpatient.   Cervantes was removed today. Patient is stable for discharge to rehab facility and will need to follow up with Dr Crump, Podiatry and Renal as outpatient. 63M PMH DM on januvia, FS 80s at home, HTN, CKD, R BKA in 2009 presented with 3 day history of worsening foot pain after having a blister on the ball of his foot pop. In ED, CRP found to be 110, ESR 96, WBC 10.94. Lactate 3.5. Creatinine 3.81. Patient admitted for cellulitis and JANY on CKD. Initially concerned for necrotizing fasciitis, but the lower extremity xrays and CT scan were negative for free air. Vascular surgery and podiatry evaluated patient. Patient was started on Vancomycin by level given his creatinine, Zosyn, and Clindamycin. Vascular surgery wanted to do an angiogram given concern for peripheral artery disease, however it was noted that his creatinine was elevated above his baseline so the angiogram was deferred. Nephrology saw the patient and noted granular casts on urine microscopy, consistent with ATN in the setting of sepsis from the foot wound. Infectious disease also evaluated patient and discontinued the Clindamycin. Wound culture grew MSSA, acinetobacter, cns, and corynebacterium, and patient was started on Unasyn for a superinfection (vancomycin by level and Zosyn were discontinued) for a total of 10 days of antibiotics.   Patient's foot continued to improve and patient was followed by podiatry for the entirety of his stay with daily dressing changes.  Patient's creatinine continued to rise, peaking at 6.3 and then went down to likely new baseline 4.3-4.6.   Patient's course was also complicated by fluid overload and several days of hypoxia requiring oxygen. Patient had B lines b/l on ultrasound and CXR concerning for pleural congestion. TTE was obtained: EF 68%, and findings consistent with mild diastolic dysfunction. Patient was started on Lasix 60mg BID with improvement of SOB and leg swelling. Patient had one episode of CP with troponemia up to 0.1, which resolved. No changes on EKG. Patient was cleared by cardiology for angiogram and surgery.  Patient was taken for an angiogram on 3/8/18 after the risk of possible contrast induced nephropathy was explained to him extensively. He was prepped with NS IVF before the procedure and after. He was found to have extensive arterial disease in the L leg and femoral-popliteal bypass was scheduled for Monday 3/12.  Patient's blood pressure was elevated and his treatment was eventually increased to Labetalol 300mg TID and Nifedipine 60XL.  His finger sticks remained under control with sliding scale insulin.  Patient was transferred to Surgical Service on 3/12 from Medical team and scheduled for fem pop bypass on 3/15.   On day of surgery, patient was tachypneic and case was cancelled due to fluid overload. Anesthesia believed patient should be medically optimized and was at risk for prolonged intubation/trach after surgery if he was not dialyzed prior to the case.  Patient refused HD and said he had to think about it.  Nephrology following. Patient was diuresed.  CXR showed worsening effusions and a Chest CT was done which showed large bilateral pleural effusions and compressive atelectasis.  Pulmonary team was consulted and recommended aggressive diuresis with Bumex gtt and no need for thoracentesis at that time.  Patient was taken off Bumex and started on PO Lasix and did well.  According to Medical, Cardiology, Nephrology, patient is cleared for fem pop bypass.  Patient was taken to the OR on 3/29 and is status post left fem pop bypass with RSVG.  Post operatively, pain was well controlled. Patient required blood transfusion and also had episode of urinary retention and cervantes was replaced. He was also febrile on POD 2 and fever workup was done which was negative. Renal ultrasound was also done due to rise in creatinine which was negative. Podiatry continued wound care and advised he will need close monitoring of foot wound as outpatient.   Cervantes was removed today. Patient is stable for discharge to rehab facility and will need to follow up with Dr Crump, Podiatry and Renal as outpatient. 63M PMH DM on januvia, FS 80s at home, HTN, CKD, R BKA in 2009 presented with 3 day history of worsening foot pain after having a blister on the ball of his foot pop. In ED, CRP found to be 110, ESR 96, WBC 10.94. Lactate 3.5. Creatinine 3.81. Patient admitted for cellulitis and JANY on CKD. Initially concerned for necrotizing fasciitis, but the lower extremity xrays and CT scan were negative for free air. Vascular surgery and podiatry evaluated patient. Patient was started on Vancomycin by level given his creatinine, Zosyn, and Clindamycin. Vascular surgery wanted to do an angiogram given concern for peripheral artery disease, however it was noted that his creatinine was elevated above his baseline so the angiogram was deferred. Nephrology saw the patient and noted granular casts on urine microscopy, consistent with ATN in the setting of sepsis from the foot wound. Infectious disease also evaluated patient and discontinued the Clindamycin. Wound culture grew MSSA, acinetobacter, cns, and corynebacterium, and patient was started on Unasyn for a superinfection (vancomycin by level and Zosyn were discontinued) for a total of 10 days of antibiotics.   Patient's foot continued to improve and patient was followed by podiatry for the entirety of his stay with daily dressing changes.  Patient's creatinine continued to rise, peaking at 6.3 and then went down to likely new baseline 4.3-4.6.   Patient's course was also complicated by fluid overload and several days of hypoxia requiring oxygen. Patient had B lines b/l on ultrasound and CXR concerning for pleural congestion. TTE was obtained: EF 68%, and findings consistent with mild diastolic dysfunction. Patient was started on Lasix 60mg BID with improvement of SOB and leg swelling. Patient had one episode of CP with troponemia up to 0.1, which resolved. No changes on EKG. Patient was cleared by cardiology for angiogram and surgery.  Patient was taken for an angiogram on 3/8/18 after the risk of possible contrast induced nephropathy was explained to him extensively. He was prepped with NS IVF before the procedure and after. He was found to have extensive arterial disease in the L leg and femoral-popliteal bypass was scheduled for Monday 3/12.  Patient's blood pressure was elevated and his treatment was eventually increased to Labetalol 300mg TID and Nifedipine 60XL.  His finger sticks remained under control with sliding scale insulin.  Patient was transferred to Surgical Service on 3/12 from Medical team and scheduled for fem pop bypass on 3/15.   On day of surgery, patient was tachypneic and case was cancelled due to fluid overload. Anesthesia believed patient should be medically optimized and was at risk for prolonged intubation/trach after surgery if he was not dialyzed prior to the case.  Patient refused HD and said he had to think about it.  Nephrology following. Patient was diuresed.  CXR showed worsening effusions and a Chest CT was done which showed large bilateral pleural effusions and compressive atelectasis.  Pulmonary team was consulted and recommended aggressive diuresis with Bumex gtt and no need for thoracentesis at that time.  Patient was taken off Bumex and started on PO Lasix and did well.  According to Medical, Cardiology, Nephrology, patient is cleared for fem pop bypass.  Patient was taken to the OR on 3/29 and is status post left fem pop bypass with RSVG.  Post operatively, pain was well controlled. Patient required blood transfusion and also had episode of urinary retention and cervantes was replaced. He was also febrile on POD 2 and fever workup was done which was negative. Renal ultrasound was also done due to rise in creatinine which was negative. Podiatry continued wound care and advised he will need close monitoring of foot wound as outpatient.   Cervantes was removed today. Patient was voiding adequately.  Two days after cervantes removal, patient complaining of distension and pain. In the setting of rising creatinine the cervantes was replaced. Patient had renal ultrasound and nephrology was asked to reconsult. Nephrology recommended discharging patient with the cervantes and following up with outpatient Nephrologist.  Patient's staples were removed, steri strips were applied.     At the time of discharge, the patient was hemodynamically stable, was tolerating PO diet, was voiding urine and passing stool, was ambulating, and was comfortable with adequate pain control. The patient was instructed to follow up with Dr. Crump within 1-2 weeks after discharge from the hospital. The patient felt comfortable with discharge. The patient was discharged to rehab. The patient had no other issues.

## 2018-02-19 NOTE — DISCHARGE NOTE ADULT - CARE PLAN
Principal Discharge DX:	Peripheral arterial disease  Goal:	status post left femoral artery to below knee popliteal bypass with RSVG, jump graft from distal anastamosis to left posterior tibialis using remaining RSVG.  Assessment and plan of treatment:	Please call to make a follow-up appointment at [phone] with Dr Crump in 1 week  Please call to schedule a follow up appointment with the [facility] at  in 1 week  -Please allow steri-strips to fall off on their own.  Ok to shower and rinse wound with warm soapy water.  Do not scrub wound, pat dry. Please call the doctor immediately if you develop fever, chills, inability to tolerate liquid or food, diarrhea, nausea, vomiting or increased abdominal pain. Follow a regular diet. Do not drive or operate machinery while taking narcotic pain medication.  Secondary Diagnosis:	Type 2 diabetes mellitus with complication, without long-term current use of insulin  Goal:	Continue current treatment  Assessment and plan of treatment:	Please call to make an appointment to follow up with your primary care physician regarding your recent hospitalization.  Secondary Diagnosis:	Hypertension, unspecified type  Goal:	Continue current treatment  Assessment and plan of treatment:	Please call to make an appointment to follow up with your primary care physician regarding your recent hospitalization.  Secondary Diagnosis:	Anemia  Goal:	Continue current treatment  Assessment and plan of treatment:	Please call to make an appointment to follow up with your primary care physician regarding your recent hospitalization. Principal Discharge DX:	Peripheral arterial disease  Goal:	status post left femoral artery to below knee popliteal bypass with RSVG, jump graft from distal anastamosis to left posterior tibialis using remaining RSVG.  Assessment and plan of treatment:	Please call to make a follow-up appointment at [phone] with Dr Crump in 1 week  Please call to schedule a follow up appointment with the [facility] at  in 1 week  -Please allow steri-strips to fall off on their own.  Ok to shower and rinse wound with warm soapy water.  Do not scrub wound, pat dry. Please call the doctor immediately if you develop fever, chills, inability to tolerate liquid or food, diarrhea, nausea, vomiting or increased abdominal pain. Follow a regular diet. Do not drive or operate machinery while taking narcotic pain medication.  Secondary Diagnosis:	Type 2 diabetes mellitus with complication, without long-term current use of insulin  Goal:	Continue current treatment  Assessment and plan of treatment:	Please call to make an appointment to follow up with your primary care physician regarding your recent hospitalization.  Secondary Diagnosis:	Hypertension, unspecified type  Goal:	Continue current treatment  Assessment and plan of treatment:	Please call to make an appointment to follow up with your primary care physician regarding your recent hospitalization.  Secondary Diagnosis:	Anemia  Goal:	Continue current treatment  Assessment and plan of treatment:	Please call to make an appointment with primary care physician and nephrology to discuss role of Epogen once creatinine is stable for 3 months for treatment of anemia of chronic disease.

## 2018-02-19 NOTE — PROGRESS NOTE ADULT - PROBLEM SELECTOR PLAN 4
-Likely abx induced.   -Will r/o c diff -- reordered.  -Bicarb and K may be lower 2/2 diarrhea. -Improved today. RVP negative. Satting well on room air.  -CXR concerning for fluid overload. Bedside ultrasound confirming. No increased work of breathing.  -Tessalon perles. Will add on Guaifenesin.

## 2018-02-19 NOTE — PROGRESS NOTE ADULT - ASSESSMENT
63M PMH DM on januvia, FS 80s at home, HTN, CKD, R BKA in 2009 presents with 3 day history of worsening foot pain concerning for cellulitis, currently stable, now with diarrhea and anemic (repleted 1 unit prbcs). 63M PMH DM on januvia, FS 80s at home, HTN, CKD, R BKA in 2009 presents with 3 day history of worsening foot pain concerning for cellulitis, currently stable. Found to have anemia yesterday, repleted with 1 unit pRBCs.

## 2018-02-19 NOTE — PROGRESS NOTE ADULT - SUBJECTIVE AND OBJECTIVE BOX
[facility] DIVISION OF KIDNEY DISEASES AND HYPERTENSION -- [phone]   FOLLOW UP NOTE  --------------------------------------------------------------------------------  HPI:  63-year-old male with PMH of HTN, DM, right BKA, and CKD admitted for left foot infection. As per review of labs on [place]/[employer], Scr was 1.51 in 3/2017. As her pt. PMD's office, Scr checked in 7/2017 was 2.30.  Labs on admission (2/12) showed elevated Scr of 3.8.  Pt. currently resting in bed and denies any SOB, CP, N/V.     PAST HISTORY  --------------------------------------------------------------------------------  No significant changes to PMH, PSH, FHx, SHx, unless otherwise noted    ALLERGIES & MEDICATIONS  --------------------------------------------------------------------------------  Allergies    No Known Allergies    Intolerances      Standing Inpatient Medications  ampicillin/sulbactam  IVPB 1.5 Gram(s) IV Intermittent every 12 hours  atorvastatin 40 milliGRAM(s) Oral at bedtime  cyanocobalamin 1000 MICROGram(s) Oral daily  dextrose 5%. 1000 milliLiter(s) IV Continuous <Continuous>  dextrose 50% Injectable 12.5 Gram(s) IV Push once  dextrose 50% Injectable 25 Gram(s) IV Push once  dextrose 50% Injectable 25 Gram(s) IV Push once  influenza   Vaccine 0.5 milliLiter(s) IntraMuscular once  insulin lispro (HumaLOG) corrective regimen sliding scale   SubCutaneous three times a day before meals  labetalol 300 milliGRAM(s) Oral three times a day  lactobacillus acidophilus 1 Tablet(s) Oral daily  NIFEdipine XL 30 milliGRAM(s) Oral daily  silver sulfADIAZINE 1% Cream 1 Application(s) Topical daily  sodium bicarbonate 650 milliGRAM(s) Oral three times a day    PRN Inpatient Medications  acetaminophen   Tablet 650 milliGRAM(s) Oral every 6 hours PRN  acetaminophen   Tablet. 650 milliGRAM(s) Oral every 6 hours PRN  benzonatate 100 milliGRAM(s) Oral every 6 hours PRN  dextrose Gel 1 Dose(s) Oral once PRN  glucagon  Injectable 1 milliGRAM(s) IntraMuscular once PRN  guaiFENesin   Syrup  (Sugar-Free) 100 milliGRAM(s) Oral every 6 hours PRN      REVIEW OF SYSTEMS  --------------------------------------------------------------------------------  General: no fever  CVS: no chest pain  RESP: no sob, no cough  ABD: no abdominal pain  : no dysuria,  MSK: no edema     VITALS/PHYSICAL EXAM  --------------------------------------------------------------------------------  T(C): 37.1 (02-19-18 @ 04:59), Max: 37.7 (02-18-18 @ 20:59)  HR: 67 (02-19-18 @ 04:59) (67 - 84)  BP: 162/60 (02-19-18 @ 04:59) (150/64 - 163/68)  RR: 18 (02-19-18 @ 04:59) (18 - 18)  SpO2: 99% (02-19-18 @ 04:59) (92% - 99%)  Wt(kg): --        02-18-18 @ 07:01  -  02-19-18 @ 07:00  --------------------------------------------------------  IN: 0 mL / OUT: 600 mL / NET: -600 mL      Physical Exam:  	 Gen: Resting in bed   	HEENT: No JVD  	Pulm: CTA B/L  	CV: S1S2+  	Abd:  soft  	LE: Right BKA;  left foot dressing seen   	Neuro: Awake and alert   	Psych: Normal affect and mood  	Skin: Warm    LABS/STUDIES  --------------------------------------------------------------------------------              8.7    8.39  >-----------<  253      [02-19-18 @ 05:00]              26.2     145  |  109  |  59  ----------------------------<  92      [02-19-18 @ 05:00]  4.5   |  18  |  5.08        Ca     8.7     [02-19-18 @ 05:00]    TPro  7.3  /  Alb  3.1  /  TBili  0.3  /  DBili  x   /  AST  39  /  ALT  46  /  AlkPhos  100  [02-19-18 @ 05:00]    PT/INR: PT 12.0 , INR 1.04       [02-19-18 @ 05:00]  PTT: 31.2       [02-19-18 @ 05:00]      Creatinine Trend:  SCr 5.08 [02-19 @ 05:00]  SCr 4.74 [02-18 @ 06:15]  SCr 4.80 [02-17 @ 07:00]  SCr 4.56 [02-16 @ 06:30]  SCr 4.11 [02-15 @ 07:20]        HbA1c 5.9      [02-13-18 @ 07:11] [facility] DIVISION OF KIDNEY DISEASES AND HYPERTENSION -- [phone]   FOLLOW UP NOTE  --------------------------------------------------------------------------------  HPI: 63-year-old male with PMH of HTN, DM, right BKA, and CKD admitted for left foot infection. As per review of labs on [place]/[employer], Scr was 1.51 in 3/2017. As her pt. PMD's office, Scr checked in 7/2017 was 2.30.  Labs on admission (2/12) showed elevated Scr of 3.8.  Pt. currently resting in bed and denies SOB, CP, or N/V.     PAST HISTORY  --------------------------------------------------------------------------------  No significant changes to PMH, PSH, FHx, SHx, unless otherwise noted    ALLERGIES & MEDICATIONS  --------------------------------------------------------------------------------  Allergies    No Known Allergies    Intolerances    Standing Inpatient Medications  ampicillin/sulbactam  IVPB 1.5 Gram(s) IV Intermittent every 12 hours  atorvastatin 40 milliGRAM(s) Oral at bedtime  cyanocobalamin 1000 MICROGram(s) Oral daily  dextrose 5%. 1000 milliLiter(s) IV Continuous <Continuous>  dextrose 50% Injectable 12.5 Gram(s) IV Push once  dextrose 50% Injectable 25 Gram(s) IV Push once  dextrose 50% Injectable 25 Gram(s) IV Push once  influenza   Vaccine 0.5 milliLiter(s) IntraMuscular once  insulin lispro (HumaLOG) corrective regimen sliding scale   SubCutaneous three times a day before meals  labetalol 300 milliGRAM(s) Oral three times a day  lactobacillus acidophilus 1 Tablet(s) Oral daily  NIFEdipine XL 30 milliGRAM(s) Oral daily  silver sulfADIAZINE 1% Cream 1 Application(s) Topical daily  sodium bicarbonate 650 milliGRAM(s) Oral three times a day    REVIEW OF SYSTEMS  --------------------------------------------------------------------------------  General: no fever  CVS: no chest pain  RESP: no SOB  ABD: no abdominal pain  : no dysuria  MSK: no edema     VITALS/PHYSICAL EXAM  --------------------------------------------------------------------------------  T(C): 37.1 (02-19-18 @ 04:59), Max: 37.7 (02-18-18 @ 20:59)  HR: 67 (02-19-18 @ 04:59) (67 - 84)  BP: 162/60 (02-19-18 @ 04:59) (150/64 - 163/68)  RR: 18 (02-19-18 @ 04:59) (18 - 18)  SpO2: 99% (02-19-18 @ 04:59) (92% - 99%)  Wt(kg): --    02-18-18 @ 07:01  -  02-19-18 @ 07:00  --------------------------------------------------------  IN: 0 mL / OUT: 600 mL / NET: -600 mL    Physical Exam:  	Gen: Resting in bed   	HEENT: No JVD  	Pulm: CTA B/L  	CV: S1S2+  	Abd:  soft  	LE: Right BKA, left foot dressing seen   	Neuro: Awake and alert   	Psych: Normal affect and mood  	Skin: Warm    LABS/STUDIES  --------------------------------------------------------------------------------              8.7    8.39  >-----------<  253      [02-19-18 @ 05:00]              26.2     145  |  109  |  59  ----------------------------<  92      [02-19-18 @ 05:00]  4.5   |  18  |  5.08        Ca     8.7     [02-19-18 @ 05:00]    TPro  7.3  /  Alb  3.1  /  TBili  0.3  /  DBili  x   /  AST  39  /  ALT  46  /  AlkPhos  100  [02-19-18 @ 05:00]    Creatinine Trend:  SCr 5.08 [02-19 @ 05:00]  SCr 4.74 [02-18 @ 06:15]  SCr 4.80 [02-17 @ 07:00]  SCr 4.56 [02-16 @ 06:30]  SCr 4.11 [02-15 @ 07:20]

## 2018-02-19 NOTE — PROGRESS NOTE ADULT - SUBJECTIVE AND OBJECTIVE BOX
Progress Note    JAMES PATRICK 63y (1955) Male [id number]  02-12-18 (7d)    DORON Thibodeaux PGY1  Pager# [phone]    63M PMH DM, HTN, CKD p/w L foot pain x 3 days.    Subjective:  No acute events overnight. Patient seen and examined at bedside. Patient reports continued foot pain, but denies any fevers, chills. States no more diarrhea. Cough persists, but somewhat improved. No difficulty breathing. Has not had to use oxygen. Continuing to make urine.     CONSTITUTIONAL: No fever, weight loss, or fatigue  EYES: No eye pain, visual disturbances, or discharge  ENMT:  No difficulty hearing, tinnitus, vertigo; No sinus or throat pain  NECK: No pain or stiffness  RESPIRATORY: +Cough. No wheezing, chills or hemoptysis; No shortness of breath  CARDIOVASCULAR: No chest pain, palpitations, dizziness.  GASTROINTESTINAL: No abdominal or epigastric pain. No nausea, vomiting, or hematemesis; No diarrhea or constipation. No melena or hematochezia.  GENITOURINARY: No dysuria, frequency, hematuria, or incontinence  NEUROLOGICAL: No headaches, memory loss, loss of strength, numbness, or tremors  SKIN: No itching, burning, rashes, or lesions   LYMPH NODES: No enlarged glands  ENDOCRINE: No heat or cold intolerance; No hair loss  MUSCULOSKELETAL: +foot pain      PAST MEDICAL & SURGICAL HISTORY:  Kidney disease (N28.9)  HTN (hypertension) (I10)  DM (diabetes mellitus) (E11.9)  S/P BKA (below knee amputation) unilateral, right (Z89.511)  No significant past surgical history (525748004)    acetaminophen   Tablet 650 milliGRAM(s) Oral every 6 hours PRN  acetaminophen   Tablet. 650 milliGRAM(s) Oral every 6 hours PRN  ampicillin/sulbactam  IVPB 1.5 Gram(s) IV Intermittent every 12 hours  atorvastatin 40 milliGRAM(s) Oral at bedtime  benzonatate 100 milliGRAM(s) Oral every 6 hours PRN  cyanocobalamin 1000 MICROGram(s) Oral daily  dextrose 5%. 1000 milliLiter(s) IV Continuous <Continuous>  dextrose 50% Injectable 12.5 Gram(s) IV Push once  dextrose 50% Injectable 25 Gram(s) IV Push once  dextrose 50% Injectable 25 Gram(s) IV Push once  dextrose Gel 1 Dose(s) Oral once PRN  glucagon  Injectable 1 milliGRAM(s) IntraMuscular once PRN  influenza   Vaccine 0.5 milliLiter(s) IntraMuscular once  insulin lispro (HumaLOG) corrective regimen sliding scale   SubCutaneous three times a day before meals  labetalol 300 milliGRAM(s) Oral three times a day  lactobacillus acidophilus 1 Tablet(s) Oral daily  NIFEdipine XL 30 milliGRAM(s) Oral daily  silver sulfADIAZINE 1% Cream 1 Application(s) Topical daily  sodium bicarbonate 650 milliGRAM(s) Oral three times a day    Objective:  T(C): 37.1 (02-19-18 @ 04:59), Max: 37.7 (02-18-18 @ 20:59)  HR: 67 (02-19-18 @ 04:59) (67 - 84)  BP: 162/60 (02-19-18 @ 04:59) (150/64 - 163/68)  RR: 18 (02-19-18 @ 04:59) (18 - 18)  SpO2: 99% (02-19-18 @ 04:59) (92% - 99%)    GENERAL: NAD, well-developed  HEAD:  Atraumatic, Normocephalic  EYES: EOMI, PERRLA, conjunctiva and sclera clear  NECK: Supple, No JVD  CHEST/LUNG: Clear to auscultation bilaterally with decreased breath sounds in lung bases.  HEART: Regular rate and rhythm; No murmurs, rubs, or gallops  ABDOMEN: Soft, Nontender, Nondistended; Bowel sounds present  EXTREMITIES:  LLE: wound on foot is much improved. Clean ,dry, no signs of infection. Pitting edema is still present.   PSYCH: AAOx3  NEUROLOGY: non-focal  SKIN: No rashes or lesions      02-18-18 @ 07:01  -  02-19-18 @ 07:00  --------------------------------------------------------  IN: 0 mL / OUT: 600 mL / NET: -600 mL        CAPILLARY BLOOD GLUCOSE      (02-19 @ 05:00)                      8.7  8.39 )-----------( 253                 26.2    Neutrophils = -- (--%)  Lymphocytes = -- (--%)  Eosinophils = -- (--%)  Basophils = -- (--%)  Monocytes = -- (--%)  Bands = --%    02-19    145  |  109<H>  |  59<H>  ----------------------------<  92  4.5   |  18<L>  |  5.08<H>    Ca    8.7      19 Feb 2018 05:00    TPro  7.3  /  Alb  3.1<L>  /  TBili  0.3  /  DBili  x   /  AST  39  /  ALT  46<H>  /  AlkPhos  100  02-19    ( 19 Feb 2018 05:00 )   PT: 12.0 SEC;   INR: 1.04 ;       PTT:31.2 SEC      RVP:          Tox:             WBC Trend: 8.39<--, 8.94<--, 7.99<--    Hb Trend: 8.7<--, 8.3<--, 7.0<--, 8.1<--, 7.6<--        New imaging in last 24 hours:  Consult notes reviewed: Progress Note    JAMES PATRICK 63y (1955) Male [id number]  02-12-18 (7d)    Dr. Galvez, [facility] PGY1  Pager# [phone]    63M PMH DM, HTN, CKD p/w L foot pain x 3 days.  cc: Foot pain    Subjective:  No acute events overnight. Patient seen and examined at bedside. Patient reports continued foot pain, but denies any fevers, chills. States no more diarrhea. Cough persists, but somewhat improved. No difficulty breathing. Has not had to use oxygen. Continuing to make urine.     CONSTITUTIONAL: No fever, weight loss, or fatigue  EYES: No eye pain, visual disturbances, or discharge  ENMT:  No difficulty hearing, tinnitus, vertigo; No sinus or throat pain  NECK: No pain or stiffness  RESPIRATORY: +Cough. No wheezing, chills or hemoptysis; No shortness of breath  CARDIOVASCULAR: No chest pain, palpitations, dizziness.  GASTROINTESTINAL: No abdominal or epigastric pain. No nausea, vomiting, or hematemesis; No diarrhea or constipation. No melena or hematochezia.  GENITOURINARY: No dysuria, frequency, hematuria, or incontinence  NEUROLOGICAL: No headaches, memory loss, loss of strength, numbness, or tremors  SKIN: No itching, burning, rashes, or lesions   LYMPH NODES: No enlarged glands  ENDOCRINE: No heat or cold intolerance; No hair loss  MUSCULOSKELETAL: +foot pain      PAST MEDICAL & SURGICAL HISTORY:  Kidney disease (N28.9)  HTN (hypertension) (I10)  DM (diabetes mellitus) (E11.9)  S/P BKA (below knee amputation) unilateral, right (Z89.511)  No significant past surgical history (814110649)    acetaminophen   Tablet 650 milliGRAM(s) Oral every 6 hours PRN  acetaminophen   Tablet. 650 milliGRAM(s) Oral every 6 hours PRN  ampicillin/sulbactam  IVPB 1.5 Gram(s) IV Intermittent every 12 hours  atorvastatin 40 milliGRAM(s) Oral at bedtime  benzonatate 100 milliGRAM(s) Oral every 6 hours PRN  cyanocobalamin 1000 MICROGram(s) Oral daily  dextrose 5%. 1000 milliLiter(s) IV Continuous <Continuous>  dextrose 50% Injectable 12.5 Gram(s) IV Push once  dextrose 50% Injectable 25 Gram(s) IV Push once  dextrose 50% Injectable 25 Gram(s) IV Push once  dextrose Gel 1 Dose(s) Oral once PRN  glucagon  Injectable 1 milliGRAM(s) IntraMuscular once PRN  influenza   Vaccine 0.5 milliLiter(s) IntraMuscular once  insulin lispro (HumaLOG) corrective regimen sliding scale   SubCutaneous three times a day before meals  labetalol 300 milliGRAM(s) Oral three times a day  lactobacillus acidophilus 1 Tablet(s) Oral daily  NIFEdipine XL 30 milliGRAM(s) Oral daily  silver sulfADIAZINE 1% Cream 1 Application(s) Topical daily  sodium bicarbonate 650 milliGRAM(s) Oral three times a day    Objective:  T(C): 37.1 (02-19-18 @ 04:59), Max: 37.7 (02-18-18 @ 20:59)  HR: 67 (02-19-18 @ 04:59) (67 - 84)  BP: 162/60 (02-19-18 @ 04:59) (150/64 - 163/68)  RR: 18 (02-19-18 @ 04:59) (18 - 18)  SpO2: 99% (02-19-18 @ 04:59) (92% - 99%)    GENERAL: NAD, well-developed  HEAD:  Atraumatic, Normocephalic  EYES: EOMI, PERRLA, conjunctiva and sclera clear  NECK: Supple, No JVD  CHEST/LUNG: Clear to auscultation bilaterally with decreased breath sounds in lung bases.  HEART: Regular rate and rhythm; No murmurs, rubs, or gallops  ABDOMEN: Soft, Nontender, Nondistended; Bowel sounds present  EXTREMITIES:  LLE: wound on foot is much improved. Clean ,dry, no signs of infection. Pitting edema is still present.   PSYCH: AAOx3  NEUROLOGY: non-focal  SKIN: No rashes or lesions      02-18-18 @ 07:01  -  02-19-18 @ 07:00  --------------------------------------------------------  IN: 0 mL / OUT: 600 mL / NET: -600 mL        CAPILLARY BLOOD GLUCOSE      (02-19 @ 05:00)                      8.7  8.39 )-----------( 253                 26.2    Neutrophils = -- (--%)  Lymphocytes = -- (--%)  Eosinophils = -- (--%)  Basophils = -- (--%)  Monocytes = -- (--%)  Bands = --%    02-19    145  |  109<H>  |  59<H>  ----------------------------<  92  4.5   |  18<L>  |  5.08<H>    Ca    8.7      19 Feb 2018 05:00    TPro  7.3  /  Alb  3.1<L>  /  TBili  0.3  /  DBili  x   /  AST  39  /  ALT  46<H>  /  AlkPhos  100  02-19    ( 19 Feb 2018 05:00 )   PT: 12.0 SEC;   INR: 1.04 ;       PTT:31.2 SEC      RVP:          Tox:             WBC Trend: 8.39<--, 8.94<--, 7.99<--    Hb Trend: 8.7<--, 8.3<--, 7.0<--, 8.1<--, 7.6<--        New imaging in last 24 hours:  Consult notes reviewed:

## 2018-02-19 NOTE — PROGRESS NOTE ADULT - ATTENDING COMMENTS
Patient seen and examined.  Case discussed with house staff.  Agree with above as edited.  Patient with left foot infection with severe sepsis and lactic acidosis with JANY on CKD III.  Vascular, podiatry, nephrology and infectious disease input appreciated.  Sepsis 2/2 cellulitis- continue  with IV unasyn based on sensitivities  Anemia of CKD - no s/s of acute blood loss - patient had diarrhea which is improving but denies melena or hematochezia. Monitor h/h  Diarrhea, r/o C-diff - Improving - Stool cdiff ordered. Suspect antibiotic associated diarrhea  JANY on CKD III - ATN from underlying sepsis -  trending cr.  Peripheral vascular disease - Appreciate vasc recs - will defer angio until Cr stable and ok with renal - patient is NOT yet stabilized or cleared from nephrology.

## 2018-02-19 NOTE — DISCHARGE NOTE ADULT - CARE PROVIDER_API CALL
Maximiliano Crump), Vascular Surgery  [address]  [address]  [place] [address]  Phone: [phone]  Fax: [phone]

## 2018-02-19 NOTE — PROGRESS NOTE ADULT - PROBLEM SELECTOR PLAN 5
-Improved today. RVP negative. Satting well on room air.  -CXR concerning for fluid overload. Bedside ultrasound confirming. No increased work of breathing.  -Tessalon perles -Labetalol increased to 300mg TID as BP still uncontrolled; continuing on Nifedipine 30mg XL.

## 2018-02-19 NOTE — DISCHARGE NOTE ADULT - ADDITIONAL INSTRUCTIONS
Follow up: Please schedule appointment with [facility] within 1 week of discharge, call [phone] for appointment  Wound Care: Please apply santyl to the wound, followed by Dakins 4x4 gauze, an abdominal pad, and kerlix daily.   Weight bearing: as tolerated in a surgical shoe, primarily to heel  Antibiotics: as instructed Follow up: Please schedule appointment with Dr. Brown at [facility] within 1 week of discharge, call [phone] for appointment  Wound Care: Please apply santyl to the wound, followed by Dakins 4x4 gauze, an abdominal pad, and kerlix daily.   Weight bearing: as tolerated in a surgical shoe, primarily to heel  Antibiotics: as instructed  pt can follow up in wound care center  and perhaps try HBO as well

## 2018-02-19 NOTE — PROGRESS NOTE ADULT - SUBJECTIVE AND OBJECTIVE BOX
pt seen at bedside in NAD. dressing to left foot c/d/i  ulceration plantar foot noted from forefoot to midfoot  superficial appears to be a burn no pus erythema decreased mild edema  applied SSD with DSD  follow up angio  will continue to follow 2-3x weeks if foot worsens please call [phone] ASAP

## 2018-02-19 NOTE — PROGRESS NOTE ADULT - PROBLEM SELECTOR PLAN 1
-L foot with cellulitis with resulting sepsis.  -ID is following; recs appreciated. S/p vancomycin and zosyn. Started on Unasyn per ID through 2/22.  -Podiatry has evaluated patient and report good improvement. recs appreciated.   -Surgery has been consulted and are following. Want an angiogram; scheduled for tomorrow.  -Wound culture growing Acinetobacter, corynebacterium, staph haemolyticus and staph aureus. Blood cx with NGTD.  -LLE xrays negative for free air; CT read negative for free air.

## 2018-02-19 NOTE — DISCHARGE NOTE ADULT - PATIENT PORTAL LINK FT
You can access the [facility] Portal, offered by [facility], by registering with the following website: [email or web address]

## 2018-02-19 NOTE — PROGRESS NOTE ADULT - ASSESSMENT
64yo M with DM and R BKA, now presenting with Diabetic foot soft tissue infection. No evidence of necrotizing fasciitis or systemic infection.     - Wound care as per podiatry   lle angio cancelled as per renal recommendation to allow renal optimazation   will follow

## 2018-02-19 NOTE — PROGRESS NOTE ADULT - PROBLEM SELECTOR PLAN 7
-Pt on oral medications at home.   -Start on sliding scale.  -Hemoglobin a1c: 5.9%  -Diabetic precautions -Heparin subq  -Dispo: PT recommends rehab.

## 2018-02-19 NOTE — DISCHARGE NOTE ADULT - PLAN OF CARE
Please call to make an appointment to follow up with your primary care physician regarding your recent hospitalization. status post left femoral artery to below knee popliteal bypass with RSVG, jump graft from distal anastamosis to left posterior tibialis using remaining RSVG. Continue current treatment Please call to make a follow-up appointment at [phone] with Dr Crump in 1 week  Please call to schedule a follow up appointment with [facility] at  in 1 week  -Please allow steri-strips to fall off on their own.  Ok to shower and rinse wound with warm soapy water.  Do not scrub wound, pat dry. Please call the doctor immediately if you develop fever, chills, inability to tolerate liquid or food, diarrhea, nausea, vomiting or increased abdominal pain. Follow a regular diet. Do not drive or operate machinery while taking narcotic pain medication. Please call to make an appointment with primary care physician and nephrology to discuss role of Epogen once creatinine is stable for 3 months for treatment of anemia of chronic disease.

## 2018-02-19 NOTE — PROGRESS NOTE ADULT - PROBLEM SELECTOR PROBLEM 7
Type 2 diabetes mellitus with complication, without long-term current use of insulin Prophylactic measure

## 2018-02-19 NOTE — PROGRESS NOTE ADULT - SUBJECTIVE AND OBJECTIVE BOX
VASCULAR SURGERY C TEAM    Patient is a 63y old  Male who presents with a chief complaint of 63M PMH DM, HTN, CKD p/w L foot pain x 3 days. (12 Feb 2018 19:33)    No acute events o/n    Vital Signs Last 24 Hrs  T(C): 37.7 (18 Feb 2018 20:59), Max: 37.7 (18 Feb 2018 20:59)  T(F): 99.8 (18 Feb 2018 20:59), Max: 99.8 (18 Feb 2018 20:59)  HR: 74 (18 Feb 2018 20:59) (74 - 84)  BP: 163/68 (18 Feb 2018 20:59) (150/64 - 163/68)  RR: 18 (18 Feb 2018 20:59) (18 - 18)  SpO2: 94% (18 Feb 2018 20:59) (92% - 96%)    Physical Exam:  General: NAD  Neuro  A&Ox3 VSS  Vascular: L toe wound unchanged                            8.3    8.94  )-----------( 256      ( 18 Feb 2018 21:00 )             26.0       02-18    141  |  108<H>  |  58<H>  ----------------------------<  89  4.5   |  17<L>  |  4.74<H>    Ca    7.8<L>      18 Feb 2018 06:15    TPro  6.6  /  Alb  2.9<L>  /  TBili  0.2  /  DBili  x   /  AST  30  /  ALT  29  /  AlkPhos  90  02-18    I&O's Summary    17 Feb 2018 07:01  -  18 Feb 2018 07:00  --------------------------------------------------------  IN: 410 mL / OUT: 250 mL / NET: 160 mL    18 Feb 2018 07:01  -  19 Feb 2018 03:58  --------------------------------------------------------  IN: 0 mL / OUT: 600 mL / NET: -600 mL    Patient is a 63 year old male with DM and a diabetic foot infection on the LLE.    -Appreciate Renal optimization and clearance for LLE angiography.  -NPOpMN  -optimize electrolytes  -preop labs in am  -continued wound care    MERCEDEZ Ba MD PGYII [id number] VASCULAR SURGERY C TEAM    Patient is a 63y old  Male who presents with a chief complaint of 63M PMH DM, HTN, CKD p/w L foot pain x 3 days. (12 Feb 2018 19:33)    No acute events o/n    Vital Signs Last 24 Hrs  T(C): 37.7 (18 Feb 2018 20:59), Max: 37.7 (18 Feb 2018 20:59)  T(F): 99.8 (18 Feb 2018 20:59), Max: 99.8 (18 Feb 2018 20:59)  HR: 74 (18 Feb 2018 20:59) (74 - 84)  BP: 163/68 (18 Feb 2018 20:59) (150/64 - 163/68)  RR: 18 (18 Feb 2018 20:59) (18 - 18)  SpO2: 94% (18 Feb 2018 20:59) (92% - 96%)    Physical Exam:  General: NAD  Neuro  A&Ox3 VSS  Vascular: L toe wound unchanged                            8.3    8.94  )-----------( 256      ( 18 Feb 2018 21:00 )             26.0       02-18    141  |  108<H>  |  58<H>  ----------------------------<  89  4.5   |  17<L>  |  4.74<H>    Ca    7.8<L>      18 Feb 2018 06:15    TPro  6.6  /  Alb  2.9<L>  /  TBili  0.2  /  DBili  x   /  AST  30  /  ALT  29  /  AlkPhos  90  02-18    I&O's Summary    17 Feb 2018 07:01  -  18 Feb 2018 07:00  --------------------------------------------------------  IN: 410 mL / OUT: 250 mL / NET: 160 mL    18 Feb 2018 07:01  -  19 Feb 2018 03:58  --------------------------------------------------------  IN: 0 mL / OUT: 600 mL / NET: -600 mL

## 2018-02-19 NOTE — PROGRESS NOTE ADULT - PROBLEM SELECTOR PROBLEM 6
Hypertension, unspecified type Type 2 diabetes mellitus with complication, without long-term current use of insulin

## 2018-02-19 NOTE — PROGRESS NOTE ADULT - PROBLEM SELECTOR PLAN 2
-JANY on evolving ATN on CKD stage III. Creatinine stable.  -Patient still urinating  -Renal consult appreciated. Renal u/s negative. Spun down urine demonstrating granular casts suggesting ATN. Baseline creatinine is 2.3.   -Creatinine trending down now.  -Avoiding nephrotoxins and RCA.  -Increased sodium bicarb to 650 TID as trending down. -JANY on evolving ATN on CKD stage III. Creatinine stable.  -Patient still urinating  -Renal consult appreciated. Renal u/s negative. Spun down urine demonstrating granular casts suggesting ATN. Baseline creatinine is 2.3.   -Creatinine 5.08 today.  -Avoiding nephrotoxins and RCA.  -Sodium bicarb trending up.

## 2018-02-19 NOTE — DISCHARGE NOTE ADULT - MEDICATION SUMMARY - MEDICATIONS TO TAKE
I will START or STAY ON the medications listed below when I get home from the hospital:    acetaminophen 325 mg oral tablet  -- 2 tab(s) by mouth every 6 hours, As needed, Moderate Pain (4 - 6)  -- Indication: For Pain    oxyCODONE-acetaminophen 5 mg-325 mg oral tablet  -- 1 tab(s) by mouth every 4 hours, As needed, Moderate Pain  -- Indication: For Pain    oxyCODONE-acetaminophen 5 mg-325 mg oral tablet  -- 2 tab(s) by mouth every 8 hours, As needed, Severe Pain (7 - 10)  -- Indication: For Pain    aspirin 81 mg oral delayed release tablet  -- 1 tab(s) by mouth once a day  -- Indication: For fem pop bypass    tamsulosin 0.4 mg oral capsule  -- 1 cap(s) by mouth once a day (at bedtime)  -- Indication: For BPH    Januvia 25 mg oral tablet  -- 1 tab(s) by mouth once a day  -- Indication: For Diabetes    atorvastatin 40 mg oral tablet  -- 1 tab(s) by mouth once a day  -- Indication: For Hyperlipidemia    sodium hypochlorite 0.25% topical solution  -- 1 application on skin once a day  -- Indication: For Wound    labetalol 100 mg oral tablet  -- 1 tab(s) by mouth 2 times a day  -- Indication: For Hypertension, unspecified type    Nifedical XL 30 mg oral tablet, extended release  -- 1 tab(s) by mouth once a day  -- Indication: For Hypertension, unspecified type    collagenase 250 units/g topical ointment  -- 1 application on skin once a day  -- Indication: For Wound    furosemide 20 mg oral tablet  -- 3 tab(s) by mouth once a day  -- Indication: For Diuretic    polyethylene glycol 3350 oral powder for reconstitution  -- 17 gram(s) by mouth once a day  -- Indication: For Constipation    pentoxifylline 400 mg oral tablet, extended release  -- 1 tab(s) by mouth 3 times a day  -- Indication: For fem pop bypass    Multiple Vitamins oral tablet  -- 1 tab(s) by mouth once a day  -- Indication: For Supplement    Vitamin B-12 1000 mcg oral tablet  -- 1 tab(s) by mouth once a day  -- Indication: For Supplement I will START or STAY ON the medications listed below when I get home from the hospital:    acetaminophen 325 mg oral tablet  -- 2 tab(s) by mouth every 6 hours, As needed, Moderate Pain (4 - 6)  -- Indication: For Pain    oxyCODONE-acetaminophen 5 mg-325 mg oral tablet  -- 1 tab(s) by mouth every 4 hours, As needed, Moderate Pain  -- Indication: For Pain    oxyCODONE-acetaminophen 5 mg-325 mg oral tablet  -- 2 tab(s) by mouth every 8 hours, As needed, Severe Pain (7 - 10)  -- Indication: For Pain    aspirin 81 mg oral delayed release tablet  -- 1 tab(s) by mouth once a day  -- Indication: For fem pop bypass    tamsulosin 0.4 mg oral capsule  -- 1 cap(s) by mouth once a day (at bedtime)  -- Indication: For BPH    Januvia 25 mg oral tablet  -- 1 tab(s) by mouth once a day  -- Indication: For Diabetes    atorvastatin 40 mg oral tablet  -- 1 tab(s) by mouth once a day  -- Indication: For Hyperlipidemia    sodium hypochlorite 0.25% topical solution  -- 1 application on skin once a day  -- Indication: For Wound    labetalol 100 mg oral tablet  -- 1 tab(s) by mouth 2 times a day  -- Indication: For Hypertension, unspecified type    Nifedical XL 30 mg oral tablet, extended release  -- 1 tab(s) by mouth once a day  -- Indication: For Hypertension, unspecified type    collagenase 250 units/g topical ointment  -- 1 application on skin once a day  -- Indication: For Wound    furosemide 20 mg oral tablet  -- 2 tab(s) by mouth 2 times a day  -- Indication: For Diuretic     polyethylene glycol 3350 oral powder for reconstitution  -- 17 gram(s) by mouth once a day  -- Indication: For Constipation    pentoxifylline 400 mg oral tablet, extended release  -- 1 tab(s) by mouth 3 times a day  -- Indication: For fem pop bypass    calcium acetate 667 mg oral tablet  -- 3 tab(s) by mouth 3 times a day  -- Indication: For High phosphorous level     Multiple Vitamins oral tablet  -- 1 tab(s) by mouth once a day  -- Indication: For Supplement    Vitamin B-12 1000 mcg oral tablet  -- 1 tab(s) by mouth once a day  -- Indication: For Supplement

## 2018-02-19 NOTE — PROGRESS NOTE ADULT - PROBLEM SELECTOR PLAN 6
-Labetalol increased to 300mg TID as BP still uncontrolled; continuing on Nifedipine 30mg XL. -Pt on oral medications at home.   -Start on sliding scale.  -Hemoglobin a1c: 5.9%  -Diabetic precautions

## 2018-02-20 LAB
ALBUMIN SERPL ELPH-MCNC: 3 G/DL — LOW (ref 3.3–5)
ALP SERPL-CCNC: 101 U/L — SIGNIFICANT CHANGE UP (ref 40–120)
ALT FLD-CCNC: 52 U/L — HIGH (ref 4–41)
AST SERPL-CCNC: 45 U/L — HIGH (ref 4–40)
BACTERIA BLD CULT: SIGNIFICANT CHANGE UP
BACTERIA BLD CULT: SIGNIFICANT CHANGE UP
BILIRUB SERPL-MCNC: 0.2 MG/DL — SIGNIFICANT CHANGE UP (ref 0.2–1.2)
BUN SERPL-MCNC: 63 MG/DL — HIGH (ref 7–23)
CALCIUM SERPL-MCNC: 8.1 MG/DL — LOW (ref 8.4–10.5)
CHLORIDE SERPL-SCNC: 105 MMOL/L — SIGNIFICANT CHANGE UP (ref 98–107)
CO2 SERPL-SCNC: 16 MMOL/L — LOW (ref 22–31)
CREAT SERPL-MCNC: 5.3 MG/DL — HIGH (ref 0.5–1.3)
FERRITIN SERPL-MCNC: 540.7 NG/ML — HIGH (ref 30–400)
GLUCOSE BLDC GLUCOMTR-MCNC: 139 MG/DL — HIGH (ref 70–99)
GLUCOSE BLDC GLUCOMTR-MCNC: 146 MG/DL — HIGH (ref 70–99)
GLUCOSE BLDC GLUCOMTR-MCNC: 153 MG/DL — HIGH (ref 70–99)
GLUCOSE BLDC GLUCOMTR-MCNC: 90 MG/DL — SIGNIFICANT CHANGE UP (ref 70–99)
GLUCOSE SERPL-MCNC: 95 MG/DL — SIGNIFICANT CHANGE UP (ref 70–99)
HCT VFR BLD CALC: 23.8 % — LOW (ref 39–50)
HGB BLD-MCNC: 7.8 G/DL — LOW (ref 13–17)
INR BLD: 1.13 — SIGNIFICANT CHANGE UP (ref 0.88–1.17)
IRON SATN MFR SERPL: 163 UG/DL — SIGNIFICANT CHANGE UP (ref 155–535)
IRON SATN MFR SERPL: 21 UG/DL — LOW (ref 45–165)
MCHC RBC-ENTMCNC: 27.5 PG — SIGNIFICANT CHANGE UP (ref 27–34)
MCHC RBC-ENTMCNC: 32.8 % — SIGNIFICANT CHANGE UP (ref 32–36)
MCV RBC AUTO: 83.8 FL — SIGNIFICANT CHANGE UP (ref 80–100)
NRBC # FLD: 0 — SIGNIFICANT CHANGE UP
PLATELET # BLD AUTO: 259 K/UL — SIGNIFICANT CHANGE UP (ref 150–400)
PMV BLD: 11.5 FL — SIGNIFICANT CHANGE UP (ref 7–13)
POTASSIUM SERPL-MCNC: 4.3 MMOL/L — SIGNIFICANT CHANGE UP (ref 3.5–5.3)
POTASSIUM SERPL-SCNC: 4.3 MMOL/L — SIGNIFICANT CHANGE UP (ref 3.5–5.3)
PROT SERPL-MCNC: 7 G/DL — SIGNIFICANT CHANGE UP (ref 6–8.3)
PROTHROM AB SERPL-ACNC: 12.6 SEC — SIGNIFICANT CHANGE UP (ref 9.8–13.1)
RBC # BLD: 2.84 M/UL — LOW (ref 4.2–5.8)
RBC # FLD: 14.8 % — HIGH (ref 10.3–14.5)
SODIUM SERPL-SCNC: 142 MMOL/L — SIGNIFICANT CHANGE UP (ref 135–145)
UIBC SERPL-MCNC: 142 UG/DL — SIGNIFICANT CHANGE UP (ref 110–370)
WBC # BLD: 9.55 K/UL — SIGNIFICANT CHANGE UP (ref 3.8–10.5)
WBC # FLD AUTO: 9.55 K/UL — SIGNIFICANT CHANGE UP (ref 3.8–10.5)

## 2018-02-20 PROCEDURE — 99233 SBSQ HOSP IP/OBS HIGH 50: CPT | Mod: GC

## 2018-02-20 PROCEDURE — 99232 SBSQ HOSP IP/OBS MODERATE 35: CPT

## 2018-02-20 RX ADMIN — AMPICILLIN SODIUM AND SULBACTAM SODIUM 100 GRAM(S): 250; 125 INJECTION, POWDER, FOR SUSPENSION INTRAMUSCULAR; INTRAVENOUS at 05:08

## 2018-02-20 RX ADMIN — ATORVASTATIN CALCIUM 40 MILLIGRAM(S): 80 TABLET, FILM COATED ORAL at 21:17

## 2018-02-20 RX ADMIN — Medication 650 MILLIGRAM(S): at 05:08

## 2018-02-20 RX ADMIN — Medication 30 MILLIGRAM(S): at 05:08

## 2018-02-20 RX ADMIN — Medication 650 MILLIGRAM(S): at 21:17

## 2018-02-20 RX ADMIN — AMPICILLIN SODIUM AND SULBACTAM SODIUM 100 GRAM(S): 250; 125 INJECTION, POWDER, FOR SUSPENSION INTRAMUSCULAR; INTRAVENOUS at 17:05

## 2018-02-20 RX ADMIN — Medication 650 MILLIGRAM(S): at 13:29

## 2018-02-20 RX ADMIN — Medication 300 MILLIGRAM(S): at 05:08

## 2018-02-20 RX ADMIN — Medication 300 MILLIGRAM(S): at 13:30

## 2018-02-20 RX ADMIN — PREGABALIN 1000 MICROGRAM(S): 225 CAPSULE ORAL at 11:23

## 2018-02-20 RX ADMIN — Medication 1: at 12:00

## 2018-02-20 RX ADMIN — Medication 1 TABLET(S): at 11:23

## 2018-02-20 NOTE — PROGRESS NOTE ADULT - PROBLEM SELECTOR PLAN 1
Pt. with JANY on CKD in the setting of infection and diarrhea. CKD in the setting of long-standing DM. Scr was 1.5 in 3/2017, increased to 2.30 in 7/2017. Scr on admission (2/12) was elevated at 3.81. Scr has increased to 5.3 today. Monitor BMP and urine output. Avoid any potential nephrotoxins Pt. with JANY on CKD in the setting of infection and diarrhea. CKD in the setting of long-standing DM. Scr was 1.5 in 3/2017, increased to 2.30 in 7/2017. Scr on admission (2/12) was elevated at 3.81, increased to 5.3 today. Monitor BMP and urine output. Avoid any potential nephrotoxins. Pt. at increased risk for radiocontrast nephropathy

## 2018-02-20 NOTE — PROGRESS NOTE ADULT - ASSESSMENT
63M long standing DM   presents with 3 day history of worsening foot pain.  Noted to have extensive blistering of the plantar foot which was de-roofed and swabbed with polymicrobial growth. Remains afebrile. WBC WNL. Left foot with superficial ulceration - no purulence, no malodor.    Favor a short course of antibiotics for possible superinfection  Continue unasyn for now  Continue abx through 2/22  If stable for d/c prior to that- change to po augmentin    Awaiting vascular work up/ renal optimization.    Guero Escobedo MD  Pager [phone]  After 5pm/weekends call [phone]

## 2018-02-20 NOTE — DIETITIAN INITIAL EVALUATION ADULT. - NS AS NUTRI INTERV COLLABORAT
1)Add Prosource 30mL (1 packet) PO  to Consistent Carbohydrate, Low Sodium, Potassium/Phosphorus restricted diet           2)Obtain daily weights              3)Add Multivitamin for micronutrient coverage                  4)RDN remains available.  Indy Matthew RDN, CD/N  pager [phone]

## 2018-02-20 NOTE — PROGRESS NOTE ADULT - SUBJECTIVE AND OBJECTIVE BOX
VASCULAR SURGERY C TEAM    Patient is a 63y old  Male who presents with a chief complaint of 63M PMH DM, HTN, CKD p/w L foot pain x 3 days. (12 Feb 2018 19:33)  Patient will not receive lle angio today because SCr elevated requiring further optimization, as per nephrology.  No acute events o/n    Vital Signs Last 24 Hrs  T(C): 36.9 (20 Feb 2018 05:05), Max: 36.9 (19 Feb 2018 21:10)  T(F): 98.5 (20 Feb 2018 05:05), Max: 98.5 (19 Feb 2018 21:10)  HR: 74 (20 Feb 2018 05:05) (70 - 77)  BP: 152/53 (20 Feb 2018 05:05) (135/61 - 163/55)  RR: 18 (20 Feb 2018 05:05) (17 - 18)  SpO2: 95% (20 Feb 2018 05:05) (95% - 98%)      Physical Exam:  General: NAD  Neuro  A&Ox3 VSS  Vascular: L toe wound unchanged, stable and clean wound on plantar and dorsum with clean dressings                          8.7    8.39  )-----------( 253      ( 19 Feb 2018 05:00 )             26.2     02-19    145  |  109<H>  |  59<H>  ----------------------------<  92  4.5   |  18<L>  |  5.08<H>    Ca    8.7      19 Feb 2018 05:00    TPro  7.3  /  Alb  3.1<L>  /  TBili  0.3  /  DBili  x   /  AST  39  /  ALT  46<H>  /  AlkPhos  100  02-19        I&O's Summary    18 Feb 2018 07:01  -  19 Feb 2018 07:00  --------------------------------------------------------  IN: 0 mL / OUT: 600 mL / NET: -600 mL VASCULAR SURGERY C TEAM    Patient is a 63y old  Male who presents with a chief complaint of 63M PMH DM, HTN, CKD p/w L foot pain x 3 days. (12 Feb 2018 19:33)  Patient will not receive lle angio today because SCr elevated requiring further optimization, as per nephrology.  No acute events o/n    Vital Signs Last 24 Hrs  T(C): 36.9 (20 Feb 2018 05:05), Max: 36.9 (19 Feb 2018 21:10)  T(F): 98.5 (20 Feb 2018 05:05), Max: 98.5 (19 Feb 2018 21:10)  HR: 74 (20 Feb 2018 05:05) (70 - 77)  BP: 152/53 (20 Feb 2018 05:05) (135/61 - 163/55)  RR: 18 (20 Feb 2018 05:05) (17 - 18)  SpO2: 95% (20 Feb 2018 05:05) (95% - 98%)      Physical Exam:  General: NAD  Neuro  A&Ox3 VSS  Vascular: L toe wound unchanged, stable and clean wound w limited healing on plantar and dorsum with clean dressings                          8.7    8.39  )-----------( 253      ( 19 Feb 2018 05:00 )             26.2     02-19    145  |  109<H>  |  59<H>  ----------------------------<  92  4.5   |  18<L>  |  5.08<H>    Ca    8.7      19 Feb 2018 05:00    TPro  7.3  /  Alb  3.1<L>  /  TBili  0.3  /  DBili  x   /  AST  39  /  ALT  46<H>  /  AlkPhos  100  02-19        I&O's Summary    18 Feb 2018 07:01  -  19 Feb 2018 07:00  --------------------------------------------------------  IN: 0 mL / OUT: 600 mL / NET: -600 mL

## 2018-02-20 NOTE — PROGRESS NOTE ADULT - ATTENDING COMMENTS
Patient seen and examined.  Case discussed with house staff.  Agree with above as edited.  Patient with left foot infection with severe sepsis and lactic acidosis with JANY on CKD III.  Vascular, podiatry, nephrology and infectious disease input appreciated.  Sepsis 2/2 cellulitis- continue  with IV unasyn.  Apprec ID f/u. Plan for abx through 2/22  JANY on CKD III - ATN from underlying sepsis -  trending cr.  Peripheral vascular disease - Appreciate vasc recs - will defer angio until Cr stable and ok with renal - patient is NOT yet stabilized or cleared from nephrology.

## 2018-02-20 NOTE — PROGRESS NOTE ADULT - ASSESSMENT
63M PMH DM on januvia, FS 80s at home, HTN, CKD, R BKA in 2009 presents with 3 day history of worsening foot pain concerning for cellulitis, currently stable. Hemoglobin downtrending and creatinine trending up.

## 2018-02-20 NOTE — PROGRESS NOTE ADULT - SUBJECTIVE AND OBJECTIVE BOX
CC: F/U left foot pain    Interval History/ROS: Remains with left foot pain. Has no other complaints today. Denies fever, chills.    Allergies  No Known Allergies      ANTIMICROBIALS:  ampicillin/sulbactam  IVPB 1.5 every 12 hours      PE:    Vital Signs Last 24 Hrs  T(C): 36.9 (20 Feb 2018 05:05), Max: 36.9 (19 Feb 2018 21:10)  T(F): 98.5 (20 Feb 2018 05:05), Max: 98.5 (19 Feb 2018 21:10)  HR: 74 (20 Feb 2018 05:05) (70 - 77)  BP: 152/53 (20 Feb 2018 05:05) (135/61 - 163/55)  BP(mean): --  RR: 18 (20 Feb 2018 05:05) (17 - 18)  SpO2: 95% (20 Feb 2018 05:05) (95% - 98%)    Gen: AOx3, NAD, non-toxic, pleasant  CV: S1+S2 normal, no murmurs  Resp: Clear bilat, no resp distress  Abd: Soft, nontender, +BS  Ext: left foot s/p rupture of blister with superficial ulceration, no purulence, no malodor  : No Ortega  IV/Skin: No thrombophlebitis  Neuro: no focal deficits    LABS:                          7.8    9.55  )-----------( 259      ( 20 Feb 2018 05:00 )             23.8       02-20    142  |  105  |  63<H>  ----------------------------<  95  4.3   |  16<L>  |  5.30<H>    Ca    8.1<L>      20 Feb 2018 05:00    TPro  7.0  /  Alb  3.0<L>  /  TBili  0.2  /  DBili  x   /  AST  45<H>  /  ALT  52<H>  /  AlkPhos  101  02-20        MICROBIOLOGY:  v  BLOOD PERIPHERAL  02-16-18 --  --  --      BLOOD PERIPHERAL  02-15-18 --  --  --      OTHER  02-12-18   MANY  OXICILLIN-RESISTANT staphylococci should be regarded as  RESISTANT to ALL other Beta-Lactams regardless of the  in-vitro results obtained.  These include: ALL  Penicillins, Cephalosporins, Amoxicillin-clavulanic  acid, Ticarcillin-clavulanic acid,  Ampicillin-sulbactam, and Imipenem.  --  Staphylococcus aureus  Staphylococcus haemolyticus  Acinetobacter baumannii  Corynebacterium species      BLOOD VENOUS  02-12-18 --  --  --      BLOOD  02-12-18 --  --  --    RADIOLOGY:    No new images.

## 2018-02-20 NOTE — PROGRESS NOTE ADULT - PROBLEM SELECTOR PLAN 2
-JANY on evolving ATN on CKD stage III. Creatinine stable.  -Patient still urinating  -Renal consult appreciated. Renal u/s negative. Spun down urine demonstrating granular casts suggesting ATN. Baseline creatinine is 2.3.   -Creatinine 5.3o today  -Avoiding nephrotoxins and RCA.  -Sodium bicarb TID

## 2018-02-20 NOTE — DIETITIAN INITIAL EVALUATION ADULT. - OTHER INFO
Pt. observed w >75% PO intake of breakfast.  Pt. denies food allergies, nausea/vomiting/constipation, or issues with chewing/swallowing.  Reports diarrhea since admission to hospital (although has not had BM as of yet, today).   Self monitors blood glucose at home w frequent hypoglycemia of ~65mg/dL in the morning.... noted on Januvia PTA... advised Pt. to consume qhs snack w carb/protein.  Discussed currently prescribed therapeutic diet modifications, of which Pt. is receptive to.  Also suggested protein supplement to help promote wound healing and help prevent further impairment of skin integrity as Pt. w left foot cellulitis.  No noted edema.  Will attempt contact w physician re: nutrition recommendations.

## 2018-02-20 NOTE — PROGRESS NOTE ADULT - SUBJECTIVE AND OBJECTIVE BOX
Progress Note    JAMES PATRICK 63y (1955) Male [id number]  02-12-18 (8d)    DORON Thibodeaux PGY1  Pager# [phone]    63M PMH DM, HTN, CKD p/w L foot pain x 3 days.    Subjective:  No acute events overnight. Patient seen and examined at bedside. No events overnight. Patient reports improvement of cough with guaifenesin. Still urinating well. Denies nausea, vomiting, abdominal pain, lightheadedness. Pain in foot is stable. No diarrhea.    CONSTITUTIONAL: No fever, weight loss, or fatigue  EYES: No eye pain, visual disturbances, or discharge  ENMT:  No sinus or throat pain  NECK: No pain or stiffness  RESPIRATORY: +cough. No wheezing, chills or hemoptysis; No shortness of breath  CARDIOVASCULAR: No chest pain, palpitations, dizziness, or leg swelling  GASTROINTESTINAL: No abdominal or epigastric pain. No nausea, vomiting, or hematemesis; No diarrhea or constipation. No melena or hematochezia.  GENITOURINARY: No dysuria, frequency, hematuria, or incontinence  NEUROLOGICAL: No headaches, memory loss, loss of strength, numbness, or tremors  SKIN: No itching, burning, rashes, or lesions   MUSCULOSKELETAL: +foot pain, stable.       PAST MEDICAL & SURGICAL HISTORY:  Kidney disease (N28.9)  HTN (hypertension) (I10)  DM (diabetes mellitus) (E11.9)  S/P BKA (below knee amputation) unilateral, right (Z89.511)  No significant past surgical history ([id number])    acetaminophen   Tablet 650 milliGRAM(s) Oral every 6 hours PRN  acetaminophen   Tablet. 650 milliGRAM(s) Oral every 6 hours PRN  ampicillin/sulbactam  IVPB 1.5 Gram(s) IV Intermittent every 12 hours  atorvastatin 40 milliGRAM(s) Oral at bedtime  benzonatate 100 milliGRAM(s) Oral every 6 hours PRN  cyanocobalamin 1000 MICROGram(s) Oral daily  dextrose 5%. 1000 milliLiter(s) IV Continuous <Continuous>  dextrose 50% Injectable 12.5 Gram(s) IV Push once  dextrose 50% Injectable 25 Gram(s) IV Push once  dextrose 50% Injectable 25 Gram(s) IV Push once  dextrose Gel 1 Dose(s) Oral once PRN  glucagon  Injectable 1 milliGRAM(s) IntraMuscular once PRN  guaiFENesin   Syrup  (Sugar-Free) 100 milliGRAM(s) Oral every 6 hours PRN  influenza   Vaccine 0.5 milliLiter(s) IntraMuscular once  insulin lispro (HumaLOG) corrective regimen sliding scale   SubCutaneous three times a day before meals  labetalol 300 milliGRAM(s) Oral three times a day  lactobacillus acidophilus 1 Tablet(s) Oral daily  NIFEdipine XL 30 milliGRAM(s) Oral daily  silver sulfADIAZINE 1% Cream 1 Application(s) Topical daily  sodium bicarbonate 650 milliGRAM(s) Oral three times a day    Objective:  T(C): 36.9 (02-20-18 @ 05:05), Max: 36.9 (02-19-18 @ 21:10)  HR: 74 (02-20-18 @ 05:05) (70 - 77)  BP: 152/53 (02-20-18 @ 05:05) (135/61 - 163/55)  RR: 18 (02-20-18 @ 05:05) (17 - 18)  SpO2: 95% (02-20-18 @ 05:05) (95% - 98%)    GENERAL: NAD, well-developed  HEAD:  Atraumatic, Normocephalic  EYES: EOMI, conjunctiva and sclera clear  NECK: Supple, No JVD  CHEST/LUNG: Bilateral breath sounds improved from prior. Bases sound better.  HEART: Regular rate and rhythm; No murmurs, rubs, or gallops  ABDOMEN: Soft, Nontender, Nondistended; Bowel sounds present  EXTREMITIES:  LLE: wound much improved. Clean, dry. Skin is growing over. No signs of infection.  PSYCH: AAOx3  NEUROLOGY: non-focal  SKIN: See extremities exam        CAPILLARY BLOOD GLUCOSE      (02-20 @ 05:00)                      7.8  9.55 )-----------( 259                 23.8    Neutrophils = -- (--%)  Lymphocytes = -- (--%)  Eosinophils = -- (--%)  Basophils = -- (--%)  Monocytes = -- (--%)  Bands = --%    02-20    142  |  105  |  63<H>  ----------------------------<  95  4.3   |  16<L>  |  5.30<H>    Ca    8.1<L>      20 Feb 2018 05:00    TPro  7.0  /  Alb  3.0<L>  /  TBili  0.2  /  DBili  x   /  AST  45<H>  /  ALT  52<H>  /  AlkPhos  101  02-20    ( 20 Feb 2018 05:00 )   PT: 12.6 SEC;   INR: 1.13 ;       PTT:31.2 SEC      WBC Trend: 9.55<--, 8.39<--, 8.94<--    Hb Trend: 7.8<--, 8.7<--, 8.3<--, 7.0<--, 8.1<--        New imaging in last 24 hours:  Consult notes reviewed:

## 2018-02-20 NOTE — PROGRESS NOTE ADULT - PROBLEM SELECTOR PLAN 3
-H/h 7.0 on 2/18/18. S/p 1 unit transfused. Now 7.9  -Send FOBT.  -Iron studies in AM tomorrow, although likely 2/2 in setting of decreased EPO production d/t worsening kidney function. Hemolysis less likely. T bili is WNL. Acute blood loss also less likely as no source.

## 2018-02-20 NOTE — PROGRESS NOTE ADULT - PROBLEM SELECTOR PLAN 1
-L foot with cellulitis with resulting sepsis.  -ID is following; recs appreciated. S/p vancomycin and zosyn. Started on Unasyn per ID through 2/22.  -Podiatry has evaluated patient and report good improvement. recs appreciated.   -Surgery has been consulted and are following. Want an angiogram; scheduling for when creatinine is stable.  -Wound culture growing Acinetobacter, corynebacterium, staph haemolyticus and staph aureus. Blood cx with NGTD.  -LLE xrays negative for free air; CT read negative for free air.

## 2018-02-20 NOTE — PROGRESS NOTE ADULT - ASSESSMENT
62yo M with DM and R BKA, now presenting with Diabetic foot soft tissue infection. No evidence of necrotizing fasciitis or systemic infection.     - Wound care as per podiatry  - lle angio cancelled for today as per renal recommendation to allow renal optimazation   - will await renal optimization   - will follow     MERCEDEZ Ba MD PGYII VASCULAR/ C TEAM pager:[phone]

## 2018-02-20 NOTE — PROGRESS NOTE ADULT - ASSESSMENT
62 y/o male with PMH of HTN, DM, right BKA and CKD admitted with left foot infection. Pt. now with JANY. 64 y/o male with PMH of HTN, DM, right BKA and CKD admitted with left foot infection. Pt. now with worsening JANY.

## 2018-02-20 NOTE — PROGRESS NOTE ADULT - PROBLEM SELECTOR PLAN 5
-Labetalol increased to 300mg TID as BP still uncontrolled; continuing on Nifedipine 30mg XL.  -Likely in setting of worsening creatinine.

## 2018-02-21 DIAGNOSIS — E87.2 ACIDOSIS: ICD-10-CM

## 2018-02-21 LAB
ALBUMIN SERPL ELPH-MCNC: 3.1 G/DL — LOW (ref 3.3–5)
ALP SERPL-CCNC: 101 U/L — SIGNIFICANT CHANGE UP (ref 40–120)
ALT FLD-CCNC: 54 U/L — HIGH (ref 4–41)
AST SERPL-CCNC: 40 U/L — SIGNIFICANT CHANGE UP (ref 4–40)
BACTERIA BLD CULT: SIGNIFICANT CHANGE UP
BILIRUB SERPL-MCNC: 0.2 MG/DL — SIGNIFICANT CHANGE UP (ref 0.2–1.2)
BUN SERPL-MCNC: 66 MG/DL — HIGH (ref 7–23)
CALCIUM SERPL-MCNC: 8.3 MG/DL — LOW (ref 8.4–10.5)
CHLORIDE SERPL-SCNC: 105 MMOL/L — SIGNIFICANT CHANGE UP (ref 98–107)
CO2 SERPL-SCNC: 16 MMOL/L — LOW (ref 22–31)
CREAT SERPL-MCNC: 5.81 MG/DL — HIGH (ref 0.5–1.3)
GLUCOSE BLDC GLUCOMTR-MCNC: 107 MG/DL — HIGH (ref 70–99)
GLUCOSE BLDC GLUCOMTR-MCNC: 125 MG/DL — HIGH (ref 70–99)
GLUCOSE BLDC GLUCOMTR-MCNC: 131 MG/DL — HIGH (ref 70–99)
GLUCOSE BLDC GLUCOMTR-MCNC: 137 MG/DL — HIGH (ref 70–99)
GLUCOSE SERPL-MCNC: 99 MG/DL — SIGNIFICANT CHANGE UP (ref 70–99)
HCT VFR BLD CALC: 24.9 % — LOW (ref 39–50)
HGB BLD-MCNC: 8.1 G/DL — LOW (ref 13–17)
MCHC RBC-ENTMCNC: 28.3 PG — SIGNIFICANT CHANGE UP (ref 27–34)
MCHC RBC-ENTMCNC: 32.5 % — SIGNIFICANT CHANGE UP (ref 32–36)
MCV RBC AUTO: 87.1 FL — SIGNIFICANT CHANGE UP (ref 80–100)
NRBC # FLD: 0 — SIGNIFICANT CHANGE UP
PLATELET # BLD AUTO: 250 K/UL — SIGNIFICANT CHANGE UP (ref 150–400)
PMV BLD: 11.5 FL — SIGNIFICANT CHANGE UP (ref 7–13)
POTASSIUM SERPL-MCNC: 4.4 MMOL/L — SIGNIFICANT CHANGE UP (ref 3.5–5.3)
POTASSIUM SERPL-SCNC: 4.4 MMOL/L — SIGNIFICANT CHANGE UP (ref 3.5–5.3)
PROT SERPL-MCNC: 7.1 G/DL — SIGNIFICANT CHANGE UP (ref 6–8.3)
RBC # BLD: 2.86 M/UL — LOW (ref 4.2–5.8)
RBC # FLD: 14.7 % — HIGH (ref 10.3–14.5)
RETICS #: 45 K/UL — SIGNIFICANT CHANGE UP (ref 25–125)
RETICS/RBC NFR: 1.6 % — SIGNIFICANT CHANGE UP (ref 0.5–2.5)
SODIUM SERPL-SCNC: 142 MMOL/L — SIGNIFICANT CHANGE UP (ref 135–145)
WBC # BLD: 9.39 K/UL — SIGNIFICANT CHANGE UP (ref 3.8–10.5)
WBC # FLD AUTO: 9.39 K/UL — SIGNIFICANT CHANGE UP (ref 3.8–10.5)

## 2018-02-21 PROCEDURE — 99232 SBSQ HOSP IP/OBS MODERATE 35: CPT

## 2018-02-21 PROCEDURE — 99233 SBSQ HOSP IP/OBS HIGH 50: CPT | Mod: GC

## 2018-02-21 RX ORDER — SODIUM BICARBONATE 1 MEQ/ML
1300 SYRINGE (ML) INTRAVENOUS THREE TIMES A DAY
Refills: 0 | Status: DISCONTINUED | OUTPATIENT
Start: 2018-02-21 | End: 2018-03-09

## 2018-02-21 RX ADMIN — AMPICILLIN SODIUM AND SULBACTAM SODIUM 100 GRAM(S): 250; 125 INJECTION, POWDER, FOR SUSPENSION INTRAMUSCULAR; INTRAVENOUS at 17:47

## 2018-02-21 RX ADMIN — Medication 650 MILLIGRAM(S): at 05:07

## 2018-02-21 RX ADMIN — Medication 1 APPLICATION(S): at 13:13

## 2018-02-21 RX ADMIN — PREGABALIN 1000 MICROGRAM(S): 225 CAPSULE ORAL at 13:14

## 2018-02-21 RX ADMIN — Medication 1300 MILLIGRAM(S): at 21:48

## 2018-02-21 RX ADMIN — Medication 100 MILLIGRAM(S): at 13:13

## 2018-02-21 RX ADMIN — Medication 300 MILLIGRAM(S): at 13:14

## 2018-02-21 RX ADMIN — Medication 650 MILLIGRAM(S): at 13:14

## 2018-02-21 RX ADMIN — AMPICILLIN SODIUM AND SULBACTAM SODIUM 100 GRAM(S): 250; 125 INJECTION, POWDER, FOR SUSPENSION INTRAMUSCULAR; INTRAVENOUS at 05:06

## 2018-02-21 RX ADMIN — ATORVASTATIN CALCIUM 40 MILLIGRAM(S): 80 TABLET, FILM COATED ORAL at 21:48

## 2018-02-21 RX ADMIN — Medication 1 TABLET(S): at 13:14

## 2018-02-21 RX ADMIN — Medication 30 MILLIGRAM(S): at 05:07

## 2018-02-21 NOTE — PROGRESS NOTE ADULT - SUBJECTIVE AND OBJECTIVE BOX
CC: F/U left foot pain    Interval History/ROS: States having watery diarrhea. Denies fever, chills.     Allergies  No Known Allergies      ANTIMICROBIALS:  ampicillin/sulbactam  IVPB 1.5 every 12 hours    PE:    Vital Signs Last 24 Hrs  T(C): 36.9 (21 Feb 2018 04:14), Max: 36.9 (20 Feb 2018 13:19)  T(F): 98.4 (21 Feb 2018 04:14), Max: 98.4 (20 Feb 2018 13:19)  HR: 62 (21 Feb 2018 04:14) (62 - 78)  BP: 146/59 (21 Feb 2018 04:14) (125/85 - 146/59)  BP(mean): --  RR: 18 (21 Feb 2018 04:14) (18 - 18)  SpO2: 97% (21 Feb 2018 04:14) (95% - 98%)    Gen: AOx3, NAD, non-toxic, pleasant  CV: S1+S2 normal, no murmurs  Resp: Clear bilat, no resp distress  Abd: Soft, nontender, +BS  Ext: left foot s/p rupture of blister with superficial ulceration, no purulence, no malodor  : No Ortega  IV/Skin: No thrombophlebitis  Neuro: no focal deficits      LABS:                          8.1    9.39  )-----------( 250      ( 21 Feb 2018 06:15 )             24.9       02-21    142  |  105  |  66<H>  ----------------------------<  99  4.4   |  16<L>  |  5.81<H>    Ca    8.3<L>      21 Feb 2018 06:15    TPro  7.1  /  Alb  3.1<L>  /  TBili  0.2  /  DBili  x   /  AST  40  /  ALT  54<H>  /  AlkPhos  101  02-21    MICROBIOLOGY:  v  BLOOD PERIPHERAL  02-16-18 --  --  --      BLOOD PERIPHERAL  02-15-18 --  --  --      OTHER  02-12-18   MANY  OXICILLIN-RESISTANT staphylococci should be regarded as  RESISTANT to ALL other Beta-Lactams regardless of the  in-vitro results obtained.  These include: ALL  Penicillins, Cephalosporins, Amoxicillin-clavulanic  acid, Ticarcillin-clavulanic acid,  Ampicillin-sulbactam, and Imipenem.  --  Staphylococcus aureus  Staphylococcus haemolyticus  Acinetobacter baumannii  Corynebacterium species      BLOOD VENOUS  02-12-18 --  --  --      BLOOD  02-12-18 --  --  --    RADIOLOGY:    No new images.

## 2018-02-21 NOTE — PROGRESS NOTE ADULT - PROBLEM SELECTOR PLAN 3
-Hgb 8.1 today  -Retic count normal. Ferritin high. Likely 2/2 in setting of decreased EPO production d/t worsening kidney function. Hemolysis less likely. T bili is WNL. Acute blood loss also less likely as no source.

## 2018-02-21 NOTE — PROGRESS NOTE ADULT - PROBLEM SELECTOR PLAN 5
-Labetalol increased to 300mg TID as BP still uncontrolled; continuing on Nifedipine 30mg XL. Better controlled today.  -Likely in setting of worsening creatinine.

## 2018-02-21 NOTE — PROGRESS NOTE ADULT - ASSESSMENT
63M long standing DM   presents with 3 day history of worsening foot pain.  Noted to have extensive blistering of the plantar foot which was de-roofed and swabbed with polymicrobial growth. Remains afebrile. WBC WNL. Left foot with superficial ulceration - no purulence, no malodor.    Favor a short course of antibiotics for possible superinfection  Continue unasyn for now  Continue abx through 2/22  If stable for d/c prior to that- change to po augmentin  If diarrhea persists, check c. diff stool    Awaiting vascular work up/ renal optimization.    Will sign off. Please call with questions.    Guero Escobedo MD  Pager [phone]  After 5pm/weekends call [phone] 63M long standing DM   presents with 3 day history of worsening foot pain.  Noted to have extensive blistering of the plantar foot which was de-roofed and swabbed with polymicrobial growth. Remains afebrile. WBC WNL. Left foot with superficial ulceration - no purulence, no malodor.    Favor a short course of antibiotics for possible superinfection  Continue unasyn for now  Continue abx through 2/22  If stable for d/c prior to that- change to po augmentin  If diarrhea persists, check c. diff stool    Awaiting vascular work up/ renal optimization.        Guero Escobedo MD  Pager [phone]  After 5pm/weekends call [phone]

## 2018-02-21 NOTE — ADVANCED PRACTICE NURSE CONSULT - REASON FOR CONSULT
Patient seen by Podiatry services for foot ulceration. Topical recommendations in chart and will continue to follow as per note.

## 2018-02-21 NOTE — PROGRESS NOTE ADULT - ASSESSMENT
64 y/o male with PMH of HTN, DM, right BKA and CKD admitted with left foot infection. Pt. now with worsening JANY.

## 2018-02-21 NOTE — PROGRESS NOTE ADULT - ASSESSMENT
63M PMH DM on januvia, FS 80s at home, HTN, CKD, R BKA in 2009 presents with 3 day history of worsening foot pain concerning for cellulitis, currently stable. ATN still evolving.

## 2018-02-21 NOTE — PROGRESS NOTE ADULT - ATTENDING COMMENTS
Patient seen and examined.  Case discussed with house staff.  Agree with above as edited.  Patient with left foot infection with severe sepsis and lactic acidosis with JANY on CKD III.  Vascular, podiatry, nephrology and infectious disease input appreciated.  Sepsis 2/2 cellulitis- continue  with IV unasyn. Tomorrow will be last day.  JANY on CKD III - ATN from underlying sepsis -  trending cr which is continuing to rise. Renal following. Will monitor urine output. Avoiding nephrotoxins.   Peripheral vascular disease - Appreciate vascular f/u - would defer angio until Cr stable

## 2018-02-21 NOTE — PROGRESS NOTE ADULT - SUBJECTIVE AND OBJECTIVE BOX
Patient is a 63y old  Male who presents with a chief complaint of 63M PMH DM, HTN, CKD p/w L foot pain x 3 days. (19 Feb 2018 10:39)      Vascular Surgery Attending Progress Note    Interval HPI: pt w/o c/o     Medications:  acetaminophen   Tablet 650 milliGRAM(s) Oral every 6 hours PRN  acetaminophen   Tablet. 650 milliGRAM(s) Oral every 6 hours PRN  ampicillin/sulbactam  IVPB 1.5 Gram(s) IV Intermittent every 12 hours  atorvastatin 40 milliGRAM(s) Oral at bedtime  benzonatate 100 milliGRAM(s) Oral every 6 hours PRN  cyanocobalamin 1000 MICROGram(s) Oral daily  dextrose 5%. 1000 milliLiter(s) IV Continuous <Continuous>  dextrose 50% Injectable 12.5 Gram(s) IV Push once  dextrose 50% Injectable 25 Gram(s) IV Push once  dextrose 50% Injectable 25 Gram(s) IV Push once  dextrose Gel 1 Dose(s) Oral once PRN  glucagon  Injectable 1 milliGRAM(s) IntraMuscular once PRN  guaiFENesin   Syrup  (Sugar-Free) 100 milliGRAM(s) Oral every 6 hours PRN  influenza   Vaccine 0.5 milliLiter(s) IntraMuscular once  insulin lispro (HumaLOG) corrective regimen sliding scale   SubCutaneous three times a day before meals  labetalol 300 milliGRAM(s) Oral three times a day  lactobacillus acidophilus 1 Tablet(s) Oral daily  NIFEdipine XL 30 milliGRAM(s) Oral daily  silver sulfADIAZINE 1% Cream 1 Application(s) Topical daily  sodium bicarbonate 650 milliGRAM(s) Oral three times a day      Vital Signs Last 24 Hrs  T(C): 36.9 (21 Feb 2018 04:14), Max: 36.9 (20 Feb 2018 13:19)  T(F): 98.4 (21 Feb 2018 04:14), Max: 98.4 (20 Feb 2018 13:19)  HR: 62 (21 Feb 2018 04:14) (62 - 78)  BP: 146/59 (21 Feb 2018 04:14) (125/85 - 146/59)  BP(mean): --  RR: 18 (21 Feb 2018 04:14) (18 - 18)  SpO2: 97% (21 Feb 2018 04:14) (95% - 98%)  I&O's Summary    20 Feb 2018 07:01  -  21 Feb 2018 07:00  --------------------------------------------------------  IN: 500 mL / OUT: 650 mL / NET: -150 mL        Physical Exam:  Neuro  A&Ox3 VSS  Vascular:   lt toe wound stable remains ischemic   Musculoskeletal: wnl    LABS:                        8.1    9.39  )-----------( 250      ( 21 Feb 2018 06:15 )             24.9     02-21    142  |  105  |  66<H>  ----------------------------<  99  4.4   |  16<L>  |  5.81<H>    Ca    8.3<L>      21 Feb 2018 06:15    TPro  7.1  /  Alb  3.1<L>  /  TBili  0.2  /  DBili  x   /  AST  40  /  ALT  54<H>  /  AlkPhos  101  02-21    PT/INR - ( 20 Feb 2018 05:00 )   PT: 12.6 SEC;   INR: 1.13              BARI NICOLAS MD  [phone]

## 2018-02-21 NOTE — PROGRESS NOTE ADULT - PROBLEM SELECTOR PLAN 2
Acidosis in the setting of JANY and infection. Serum CO2 low at 16. Advise to continue with sodium bicarbonate oral therapy. Monitor serum CO2 Acidosis in the setting of JANY and infection. Serum CO2 low at 16. Recommend to increase oral sodium bicarbonate to 1300 mg TID. Monitor serum CO2

## 2018-02-21 NOTE — PROGRESS NOTE ADULT - SUBJECTIVE AND OBJECTIVE BOX
[facility] DIVISION OF KIDNEY DISEASES AND HYPERTENSION -- FOLLOW UP NOTE  --------------------------------------------------------------------------------  HPI: 63-year-old male with PMH of HTN, DM, right BKA, and CKD admitted for left foot infection. As per review of labs on [place]/[employer], Scr was 1.51 in 3/2017. As her pt. PMD's office, Scr checked in 7/2017 was 2.30.  Labs on admission (2/12) showed elevated Scr of 3.8 which has progressively increased to 5.8 today.  Pt. currently resting in bed and denies SOB, CP, or N/V.       PAST HISTORY  --------------------------------------------------------------------------------  No significant changes to PMH, PSH, FHx, SHx, unless otherwise noted    ALLERGIES & MEDICATIONS  --------------------------------------------------------------------------------  Allergies    No Known Allergies    Intolerances      Standing Inpatient Medications  ampicillin/sulbactam  IVPB 1.5 Gram(s) IV Intermittent every 12 hours  atorvastatin 40 milliGRAM(s) Oral at bedtime  cyanocobalamin 1000 MICROGram(s) Oral daily  dextrose 5%. 1000 milliLiter(s) IV Continuous <Continuous>  dextrose 50% Injectable 12.5 Gram(s) IV Push once  dextrose 50% Injectable 25 Gram(s) IV Push once  dextrose 50% Injectable 25 Gram(s) IV Push once  influenza   Vaccine 0.5 milliLiter(s) IntraMuscular once  insulin lispro (HumaLOG) corrective regimen sliding scale   SubCutaneous three times a day before meals  labetalol 300 milliGRAM(s) Oral three times a day  lactobacillus acidophilus 1 Tablet(s) Oral daily  NIFEdipine XL 30 milliGRAM(s) Oral daily  silver sulfADIAZINE 1% Cream 1 Application(s) Topical daily  sodium bicarbonate 650 milliGRAM(s) Oral three times a day        REVIEW OF SYSTEMS  --------------------------------------------------------------------------------  General: no fever  CVS: no chest pain  RESP: no SOB  ABD: no abdominal pain  : no dysuria  MSK: no edema       VITALS/PHYSICAL EXAM  --------------------------------------------------------------------------------  T(C): 36.9 (02-21-18 @ 04:14), Max: 36.9 (02-20-18 @ 13:19)  HR: 62 (02-21-18 @ 04:14) (62 - 78)  BP: 146/59 (02-21-18 @ 04:14) (125/85 - 146/59)  RR: 18 (02-21-18 @ 04:14) (18 - 18)  SpO2: 97% (02-21-18 @ 04:14) (95% - 98%)  Wt(kg): --        02-20-18 @ 07:01  -  02-21-18 @ 07:00  --------------------------------------------------------  IN: 500 mL / OUT: 650 mL / NET: -150 mL      Physical Exam:  	Gen: Resting in bed   	HEENT: No JVD  	Pulm: CTA B/L  	CV: S1S2+  	Abd:  soft  	LE: Right BKA, left foot dressing seen   	Neuro: Awake and alert   	Psych: Normal affect and mood  	Skin: Warm      LABS/STUDIES  --------------------------------------------------------------------------------              8.1    9.39  >-----------<  250      [02-21-18 @ 06:15]              24.9     142  |  105  |  66  ----------------------------<  99      [02-21-18 @ 06:15]  4.4   |  16  |  5.81        Ca     8.3     [02-21-18 @ 06:15]    TPro  7.1  /  Alb  3.1  /  TBili  0.2  /  DBili  x   /  AST  40  /  ALT  54  /  AlkPhos  101  [02-21-18 @ 06:15]    PT/INR: PT 12.6 , INR 1.13       [02-20-18 @ 05:00]      Creatinine Trend:  SCr 5.81 [02-21 @ 06:15]  SCr 5.30 [02-20 @ 05:00]  SCr 5.08 [02-19 @ 05:00]  SCr 4.74 [02-18 @ 06:15]  SCr 4.80 [02-17 @ 07:00] [facility] DIVISION OF KIDNEY DISEASES AND HYPERTENSION -- FOLLOW UP NOTE  --------------------------------------------------------------------------------  HPI: 63-year-old male with PMH of HTN, DM, right BKA, and CKD admitted for left foot infection. As per review of labs on [place]/[employer], Scr was 1.51 in 3/2017. As her pt. PMD's office, Scr checked in 7/2017 was 2.30.  Labs on admission (2/12) showed elevated Scr of 3.8 which has progressively increased to 5.8 today.  Pt. currently resting in bed and denies SOB, CP, or N/V.     PAST HISTORY  --------------------------------------------------------------------------------  No significant changes to PMH, PSH, FHx, SHx, unless otherwise noted    ALLERGIES & MEDICATIONS  --------------------------------------------------------------------------------  Allergies    No Known Allergies    Standing Inpatient Medications  ampicillin/sulbactam  IVPB 1.5 Gram(s) IV Intermittent every 12 hours  atorvastatin 40 milliGRAM(s) Oral at bedtime  cyanocobalamin 1000 MICROGram(s) Oral daily  dextrose 5%. 1000 milliLiter(s) IV Continuous <Continuous>  dextrose 50% Injectable 12.5 Gram(s) IV Push once  dextrose 50% Injectable 25 Gram(s) IV Push once  dextrose 50% Injectable 25 Gram(s) IV Push once  influenza   Vaccine 0.5 milliLiter(s) IntraMuscular once  insulin lispro (HumaLOG) corrective regimen sliding scale   SubCutaneous three times a day before meals  labetalol 300 milliGRAM(s) Oral three times a day  lactobacillus acidophilus 1 Tablet(s) Oral daily  NIFEdipine XL 30 milliGRAM(s) Oral daily  silver sulfADIAZINE 1% Cream 1 Application(s) Topical daily  sodium bicarbonate 650 milliGRAM(s) Oral three times a day    REVIEW OF SYSTEMS  --------------------------------------------------------------------------------  General: no fever  CVS: no chest pain  RESP: no SOB  ABD: no abdominal pain  : no dysuria  MSK: no edema     VITALS/PHYSICAL EXAM  --------------------------------------------------------------------------------  T(C): 36.9 (02-21-18 @ 04:14), Max: 36.9 (02-20-18 @ 13:19)  HR: 62 (02-21-18 @ 04:14) (62 - 78)  BP: 146/59 (02-21-18 @ 04:14) (125/85 - 146/59)  RR: 18 (02-21-18 @ 04:14) (18 - 18)  SpO2: 97% (02-21-18 @ 04:14) (95% - 98%)  Wt(kg): --    02-20-18 @ 07:01  -  02-21-18 @ 07:00  --------------------------------------------------------  IN: 500 mL / OUT: 650 mL / NET: -150 mL    Physical Exam:  	Gen: Resting in bed   	HEENT: No JVD  	Pulm: CTA B/L  	CV: S1S2+  	Abd:  soft  	LE: Right BKA, left foot dressing seen   	Neuro: Awake and alert   	Psych: Normal affect and mood  	Skin: Warm    LABS/STUDIES  --------------------------------------------------------------------------------              8.1    9.39  >-----------<  250      [02-21-18 @ 06:15]              24.9     142  |  105  |  66  ----------------------------<  99      [02-21-18 @ 06:15]  4.4   |  16  |  5.81        Ca     8.3     [02-21-18 @ 06:15]    TPro  7.1  /  Alb  3.1  /  TBili  0.2  /  DBili  x   /  AST  40  /  ALT  54  /  AlkPhos  101  [02-21-18 @ 06:15]    Creatinine Trend:  SCr 5.81 [02-21 @ 06:15]  SCr 5.30 [02-20 @ 05:00]  SCr 5.08 [02-19 @ 05:00]  SCr 4.74 [02-18 @ 06:15]  SCr 4.80 [02-17 @ 07:00]

## 2018-02-21 NOTE — PROGRESS NOTE ADULT - PROBLEM SELECTOR PLAN 1
Pt. with JANY on CKD in the setting of infection and diarrhea. CKD in the setting of long-standing DM. Scr was 1.5 in 3/2017, increased to 2.30 in 7/2017. Scr on admission (2/12) was elevated at 3.81, which has progressively increased to 5.8 today. Pt. non-oliguric.  Monitor BMP and urine output. Avoid any potential nephrotoxins. Pt. at increased risk for radiocontrast nephropathy Pt. with JANY on CKD in the setting of infection and diarrhea. CKD in the setting of long-standing DM. Scr was 1.5 in 3/2017, increased to 2.30 in 7/2017. Scr on admission (2/12) was elevated at 3.81, which has progressively increased to 5.8 today. Pt. non-oliguric. Monitor BMP and urine output. Avoid any potential nephrotoxins. Pt. at increased risk for radiocontrast nephropathy

## 2018-02-21 NOTE — PROGRESS NOTE ADULT - PROBLEM SELECTOR PLAN 2
-JANY on evolving ATN on CKD stage III. Creatinine uptrending.  -Patient still urinating. Will trend outputs.  -Renal consult appreciated. Renal u/s negative. Spun down urine demonstrating granular casts suggesting ATN. Baseline creatinine is 2.3.   -Creatinine 5.8 today  -Avoiding nephrotoxins and RCA.  -Sodium bicarb TID, increased today.

## 2018-02-21 NOTE — PROGRESS NOTE ADULT - SUBJECTIVE AND OBJECTIVE BOX
Progress Note    JAMES PATRICK 63y (1955) Male [id number]  02-12-18 (9d)    Dr. Galvez [facility] PGY1  Pager# [phone]    Chief Complaint: 63M PMH DM, HTN, CKD p/w L foot pain x 3 days.    Subjective:  No acute events overnight. Patient seen and examined at bedside. Has been having diarrhea BID. Has not noted blood. denies nausea, vomiting, abdominal pain. Reports that he is feeling okay. Cough is helped by guaifenesin. Reports urinates normally    Review of Systems:  CONSTITUTIONAL: No fever, weight loss, or fatigue  EYES: No eye pain, visual disturbances, or discharge  ENMT:  No difficulty hearing, tinnitus, vertigo; No sinus or throat pain  NECK: No pain or stiffness  RESPIRATORY: No cough, wheezing, chills or hemoptysis; No shortness of breath  CARDIOVASCULAR: No chest pain, palpitations, dizziness,  GASTROINTESTINAL: No abdominal or epigastric pain. No nausea, vomiting, or hematemesis; +diarrhea   GENITOURINARY: No dysuria, frequency, hematuria, or incontinence  NEUROLOGICAL: No headaches, memory loss, loss of strength, numbness, or tremors  SKIN: No itching, burning, rashes, or lesions   MUSCULOSKELETAL: No joint pain or swelling; No muscle, back, or extremity pain      PAST MEDICAL & SURGICAL HISTORY:  Kidney disease (N28.9)  HTN (hypertension) (I10)  DM (diabetes mellitus) (E11.9)  S/P BKA (below knee amputation) unilateral, right (Z89.511)  No significant past surgical history ([id number])    acetaminophen   Tablet 650 milliGRAM(s) Oral every 6 hours PRN  acetaminophen   Tablet. 650 milliGRAM(s) Oral every 6 hours PRN  ampicillin/sulbactam  IVPB 1.5 Gram(s) IV Intermittent every 12 hours  atorvastatin 40 milliGRAM(s) Oral at bedtime  benzonatate 100 milliGRAM(s) Oral every 6 hours PRN  cyanocobalamin 1000 MICROGram(s) Oral daily  dextrose 5%. 1000 milliLiter(s) IV Continuous <Continuous>  dextrose 50% Injectable 12.5 Gram(s) IV Push once  dextrose 50% Injectable 25 Gram(s) IV Push once  dextrose 50% Injectable 25 Gram(s) IV Push once  dextrose Gel 1 Dose(s) Oral once PRN  glucagon  Injectable 1 milliGRAM(s) IntraMuscular once PRN  guaiFENesin   Syrup  (Sugar-Free) 100 milliGRAM(s) Oral every 6 hours PRN  influenza   Vaccine 0.5 milliLiter(s) IntraMuscular once  insulin lispro (HumaLOG) corrective regimen sliding scale   SubCutaneous three times a day before meals  labetalol 300 milliGRAM(s) Oral three times a day  lactobacillus acidophilus 1 Tablet(s) Oral daily  NIFEdipine XL 30 milliGRAM(s) Oral daily  silver sulfADIAZINE 1% Cream 1 Application(s) Topical daily  sodium bicarbonate 1300 milliGRAM(s) Oral three times a day    Objective:  T(C): 36.9 (02-21-18 @ 13:16), Max: 36.9 (02-21-18 @ 04:14)  HR: 78 (02-21-18 @ 13:16) (62 - 78)  BP: 152/612 (02-21-18 @ 13:16) (125/85 - 152/612)  RR: 118 (02-21-18 @ 13:16) (18 - 118)  SpO2: 92% (02-21-18 @ 13:16) (92% - 97%)    Physical exam:  GENERAL: NAD, well-developed  HEAD:  Atraumatic, Normocephalic  EYES: EOMI, conjunctiva and sclera clear  NECK: Supple, No JVD  CHEST/LUNG: Clear to auscultation bilaterally; mildly decreased at bases, improved from prior.  HEART: Regular rate and rhythm; No murmurs, rubs, or gallops  ABDOMEN: Soft, Nontender, Nondistended; Bowel sounds present  EXTREMITIES:  LLE: sole of foot with new blister forming distal to 5th MTP. Otherwise foot is dry.  PSYCH: AAOx3  NEUROLOGY: non-focal      02-20-18 @ 07:01  -  02-21-18 @ 07:00  --------------------------------------------------------  IN: 500 mL / OUT: 650 mL / NET: -150 mL        CAPILLARY BLOOD GLUCOSE      (02-21 @ 06:15)                      8.1  9.39 )-----------( 250                 24.9    Neutrophils = -- (--%)  Lymphocytes = -- (--%)  Eosinophils = -- (--%)  Basophils = -- (--%)  Monocytes = -- (--%)  Bands = --%    02-21    142  |  105  |  66<H>  ----------------------------<  99  4.4   |  16<L>  |  5.81<H>    Ca    8.3<L>      21 Feb 2018 06:15    TPro  7.1  /  Alb  3.1<L>  /  TBili  0.2  /  DBili  x   /  AST  40  /  ALT  54<H>  /  AlkPhos  101  02-21    ( 20 Feb 2018 05:00 )   PT: 12.6 SEC;   INR: 1.13 ;       WBC Trend: 9.39<--, 9.55<--, 8.39<--    Hb Trend: 8.1<--, 7.8<--, 8.7<--, 8.3<--, 7.0<--        New imaging in last 24 hours:  Consult notes reviewed: nephorlogy, vascular

## 2018-02-21 NOTE — PROGRESS NOTE ADULT - ASSESSMENT
62yo M with DM and R BKA, now presenting with Diabetic foot soft tissue infection. No evidence of necrotizing fasciitis or systemic infection.     - Wound care as per podiatry  - d/w pt that he needs to d/w renal the possibility of long term dialysis needs w and w/o angiography  will follow

## 2018-02-21 NOTE — PROGRESS NOTE ADULT - SUBJECTIVE AND OBJECTIVE BOX
pt seen at bedside in NAD. dressing to left foot c/d/i  ulceration plantar foot noted from forefoot to midfoot  superficial appears to be a burn no pus erythema decreased mild edema decreased macerations between toes  applied SSD with DSD  follow up angio  will continue to follow 3x weeks if foot worsens please call [phone] ASAP

## 2018-02-22 LAB
ALBUMIN SERPL ELPH-MCNC: 2.9 G/DL — LOW (ref 3.3–5)
ALP SERPL-CCNC: 101 U/L — SIGNIFICANT CHANGE UP (ref 40–120)
ALT FLD-CCNC: 55 U/L — HIGH (ref 4–41)
AST SERPL-CCNC: 40 U/L — SIGNIFICANT CHANGE UP (ref 4–40)
BILIRUB SERPL-MCNC: 0.2 MG/DL — SIGNIFICANT CHANGE UP (ref 0.2–1.2)
BUN SERPL-MCNC: 74 MG/DL — HIGH (ref 7–23)
C DIFF TOX GENS STL QL NAA+PROBE: SIGNIFICANT CHANGE UP
CALCIUM SERPL-MCNC: 8 MG/DL — LOW (ref 8.4–10.5)
CHLORIDE SERPL-SCNC: 104 MMOL/L — SIGNIFICANT CHANGE UP (ref 98–107)
CO2 SERPL-SCNC: 18 MMOL/L — LOW (ref 22–31)
CREAT SERPL-MCNC: 6.13 MG/DL — HIGH (ref 0.5–1.3)
GLUCOSE BLDC GLUCOMTR-MCNC: 110 MG/DL — HIGH (ref 70–99)
GLUCOSE BLDC GLUCOMTR-MCNC: 202 MG/DL — HIGH (ref 70–99)
GLUCOSE BLDC GLUCOMTR-MCNC: 75 MG/DL — SIGNIFICANT CHANGE UP (ref 70–99)
GLUCOSE BLDC GLUCOMTR-MCNC: 99 MG/DL — SIGNIFICANT CHANGE UP (ref 70–99)
GLUCOSE SERPL-MCNC: 91 MG/DL — SIGNIFICANT CHANGE UP (ref 70–99)
HCT VFR BLD CALC: 24.3 % — LOW (ref 39–50)
HGB BLD-MCNC: 7.9 G/DL — LOW (ref 13–17)
MCHC RBC-ENTMCNC: 27.5 PG — SIGNIFICANT CHANGE UP (ref 27–34)
MCHC RBC-ENTMCNC: 32.5 % — SIGNIFICANT CHANGE UP (ref 32–36)
MCV RBC AUTO: 84.7 FL — SIGNIFICANT CHANGE UP (ref 80–100)
NRBC # FLD: 0 — SIGNIFICANT CHANGE UP
PLATELET # BLD AUTO: 273 K/UL — SIGNIFICANT CHANGE UP (ref 150–400)
PMV BLD: 11.4 FL — SIGNIFICANT CHANGE UP (ref 7–13)
POTASSIUM SERPL-MCNC: 4.5 MMOL/L — SIGNIFICANT CHANGE UP (ref 3.5–5.3)
POTASSIUM SERPL-SCNC: 4.5 MMOL/L — SIGNIFICANT CHANGE UP (ref 3.5–5.3)
PROT SERPL-MCNC: 7 G/DL — SIGNIFICANT CHANGE UP (ref 6–8.3)
RBC # BLD: 2.87 M/UL — LOW (ref 4.2–5.8)
RBC # FLD: 14.5 % — SIGNIFICANT CHANGE UP (ref 10.3–14.5)
SODIUM SERPL-SCNC: 142 MMOL/L — SIGNIFICANT CHANGE UP (ref 135–145)
WBC # BLD: 10.83 K/UL — HIGH (ref 3.8–10.5)
WBC # FLD AUTO: 10.83 K/UL — HIGH (ref 3.8–10.5)

## 2018-02-22 PROCEDURE — 99233 SBSQ HOSP IP/OBS HIGH 50: CPT | Mod: GC

## 2018-02-22 PROCEDURE — 99232 SBSQ HOSP IP/OBS MODERATE 35: CPT | Mod: GC

## 2018-02-22 PROCEDURE — 99232 SBSQ HOSP IP/OBS MODERATE 35: CPT

## 2018-02-22 RX ADMIN — Medication 300 MILLIGRAM(S): at 05:00

## 2018-02-22 RX ADMIN — Medication 1300 MILLIGRAM(S): at 05:00

## 2018-02-22 RX ADMIN — PREGABALIN 1000 MICROGRAM(S): 225 CAPSULE ORAL at 12:44

## 2018-02-22 RX ADMIN — Medication 2: at 12:44

## 2018-02-22 RX ADMIN — Medication 300 MILLIGRAM(S): at 21:59

## 2018-02-22 RX ADMIN — AMPICILLIN SODIUM AND SULBACTAM SODIUM 100 GRAM(S): 250; 125 INJECTION, POWDER, FOR SUSPENSION INTRAMUSCULAR; INTRAVENOUS at 18:17

## 2018-02-22 RX ADMIN — Medication 30 MILLIGRAM(S): at 05:00

## 2018-02-22 RX ADMIN — Medication 1300 MILLIGRAM(S): at 21:59

## 2018-02-22 RX ADMIN — ATORVASTATIN CALCIUM 40 MILLIGRAM(S): 80 TABLET, FILM COATED ORAL at 21:59

## 2018-02-22 RX ADMIN — Medication 100 MILLIGRAM(S): at 09:26

## 2018-02-22 RX ADMIN — AMPICILLIN SODIUM AND SULBACTAM SODIUM 100 GRAM(S): 250; 125 INJECTION, POWDER, FOR SUSPENSION INTRAMUSCULAR; INTRAVENOUS at 05:00

## 2018-02-22 RX ADMIN — Medication 1300 MILLIGRAM(S): at 14:34

## 2018-02-22 RX ADMIN — Medication 1 TABLET(S): at 12:44

## 2018-02-22 NOTE — PROGRESS NOTE ADULT - ASSESSMENT
62 y/o male with PMH of HTN, DM, right BKA and CKD admitted with left foot infection. Pt. now with worsening JANY.

## 2018-02-22 NOTE — PROGRESS NOTE ADULT - ASSESSMENT
63M long standing DM   presents with 3 day history of worsening foot pain.  Noted to have extensive blistering of the plantar foot which was de-roofed and swabbed with polymicrobial growth. Remains afebrile. WBC WNL. Left foot with superficial ulceration - no purulence, no malodor.    Favor a short course of antibiotics for possible superinfection  Continue unasyn for now complete today 2/22  If diarrhea persists, check c. diff stool    Awaiting vascular work up/ renal optimization. 63M long standing DM   presents with 3 day history of worsening foot pain.  Noted to have extensive blistering of the plantar foot which was de-roofed and swabbed with polymicrobial growth. Remains afebrile. WBC WNL. Left foot with superficial ulceration - no purulence, no malodor.    Favor a short course of antibiotics for possible superinfection  Continue unasyn for now complete today 2/22  With persistent diarrhea consider c diff test    Awaiting vascular work up/ renal optimization.

## 2018-02-22 NOTE — PROGRESS NOTE ADULT - ASSESSMENT
63M LF toes, forefoot, midfoot blister with cellulitis    - Pt seen and evaluated  - Appearance improving, erythema and edema improved   - Low concern for OM  - Low concern for nec fasc  - Cont IV abx  - Silvadene to LF wound daily  - No pod sx intervention at this time  - Vasc planning angio when renally stable

## 2018-02-22 NOTE — PROGRESS NOTE ADULT - PROBLEM SELECTOR PLAN 2
-JANY on evolving ATN on CKD stage III. Creatinine uptrending.  -Patient still urinating. Will trend outputs.  -Renal consult appreciated. Renal u/s negative. Spun down urine demonstrating granular casts suggesting ATN. Baseline creatinine is 2.3.   -Creatinine 5.8 today  -Avoiding nephrotoxins and RCA.  -Sodium bicarb TID -JANY on evolving ATN on CKD stage III. Creatinine uptrending.  -Patient still urinating. Will trend outputs.  -Renal consult appreciated. Renal u/s negative. Spun down urine demonstrating granular casts suggesting ATN. Baseline creatinine is 2.3.   -Creatinine 6.13 today  -Avoiding nephrotoxins and RCA.  -Sodium bicarb TID

## 2018-02-22 NOTE — PROGRESS NOTE ADULT - ATTENDING COMMENTS
63M with DM p/w foot pain.  Noted to have extensive blistering of the plantar foot with superficial ulceration, but no purulence, malodor or cellulitis.  JANY on CKD, now with diarrhea.    complete course of unasyn today  check Cdiff

## 2018-02-22 NOTE — PROGRESS NOTE ADULT - PROBLEM SELECTOR PLAN 1
-L foot with cellulitis with resulting sepsis.  -ID is following; recs appreciated. S/p vancomycin and zosyn. Started on Unasyn per ID through 2/22 (today is last day).  -Podiatry has evaluated patient and report good improvement. recs appreciated.   -Surgery has been consulted and are following. Want an angiogram; scheduling for when creatinine is stable.  -Wound culture growing Acinetobacter, corynebacterium, staph haemolyticus and staph aureus. Blood cx with NGTD.  -LLE xrays negative for free air; CT read negative for free air.

## 2018-02-22 NOTE — PROGRESS NOTE ADULT - SUBJECTIVE AND OBJECTIVE BOX
Patient is a 63y old  Male who presents with a chief complaint of 63M PMH DM, HTN, CKD p/w L foot pain x 3 days. (19 Feb 2018 10:39)       INTERVAL HPI/OVERNIGHT EVENTS:  Patient seen and evaluated at bedside.  Pt is resting comfortable in NAD. Denies N/V/F/C.  Pain rated at X/10    Allergies    No Known Allergies    Intolerances        Vital Signs Last 24 Hrs  T(C): 36.9 (22 Feb 2018 04:40), Max: 36.9 (21 Feb 2018 13:16)  T(F): 98.4 (22 Feb 2018 04:40), Max: 98.5 (21 Feb 2018 13:16)  HR: 72 (22 Feb 2018 04:40) (66 - 78)  BP: 158/75 (22 Feb 2018 04:40) (125/63 - 158/75)  BP(mean): --  RR: 20 (22 Feb 2018 04:40) (18 - 118)  SpO2: 87% (22 Feb 2018 04:40) (87% - 93%)    LABS:                        8.1    9.39  )-----------( 250      ( 21 Feb 2018 06:15 )             24.9     02-21    142  |  105  |  66<H>  ----------------------------<  99  4.4   |  16<L>  |  5.81<H>    Ca    8.3<L>      21 Feb 2018 06:15    TPro  7.1  /  Alb  3.1<L>  /  TBili  0.2  /  DBili  x   /  AST  40  /  ALT  54<H>  /  AlkPhos  101  02-21        CAPILLARY BLOOD GLUCOSE      POCT Blood Glucose.: 137 mg/dL (21 Feb 2018 22:41)  POCT Blood Glucose.: 131 mg/dL (21 Feb 2018 17:28)  POCT Blood Glucose.: 125 mg/dL (21 Feb 2018 12:01)  POCT Blood Glucose.: 107 mg/dL (21 Feb 2018 08:22)      Lower Extremity Physical Exam:  Vasular: DP/PT 0/4, B/L, CFT <2 seconds B/L, Temperature gradient warm, B/L.   Neuro: Epicritic sensation absent to the level of toes, B/L.  Musculoskeletal/Ortho:  Skin:  Wound #1:   Location: Left foot deroofed blister with cherry red non-blanchable trophic changes to plantar aspect of toes 1-5, forefoot, midfoot, no ascending erythema or swelling, no malodor, no purulence, no deep probe, etiology unknown, ROM of toes intact, CFT to each toes normal, no sinus tract, no undermining.    RADIOLOGY & ADDITIONAL TESTS:

## 2018-02-22 NOTE — PROGRESS NOTE ADULT - PROBLEM SELECTOR PLAN 5
-Labetalol increased to 300mg TID as BP still uncontrolled  -Likely in setting of worsening creatinine.

## 2018-02-22 NOTE — PROGRESS NOTE ADULT - SUBJECTIVE AND OBJECTIVE BOX
Patient is a 63y old  Male who presents with a chief complaint of 63M PMH DM, HTN, CKD p/w L foot pain x 3 days. (19 Feb 2018 10:39)    Interval HPI: pt w/o c/o     Vital Signs Last 24 Hrs  T(C): 36.9 (22 Feb 2018 04:40), Max: 36.9 (21 Feb 2018 13:16)  T(F): 98.4 (22 Feb 2018 04:40), Max: 98.5 (21 Feb 2018 13:16)  HR: 72 (22 Feb 2018 04:40) (66 - 78)  BP: 158/75 (22 Feb 2018 04:40) (125/63 - 158/75)  RR: 20 (22 Feb 2018 04:40) (18 - 118)  SpO2: 87% (22 Feb 2018 04:40) (87% - 93%)    I&O's Summary    20 Feb 2018 07:01  -  21 Feb 2018 07:00  --------------------------------------------------------  IN: 500 mL / OUT: 650 mL / NET: -150 mL    21 Feb 2018 07:01  -  22 Feb 2018 06:18  --------------------------------------------------------  IN: 150 mL / OUT: 150 mL / NET: 0 mL            Physical Exam:  Neuro  A&Ox3 VSS  Vascular:   lt toe wound stable remains ischemic   Musculoskeletal: wnl    LABS:                           8.1    9.39  )-----------( 250      ( 21 Feb 2018 06:15 )             24.9       02-21    142  |  105  |  66<H>  ----------------------------<  99  4.4   |  16<L>  |  5.81<H>    Ca    8.3<L>      21 Feb 2018 06:15    TPro  7.1  /  Alb  3.1<L>  /  TBili  0.2  /  DBili  x   /  AST  40  /  ALT  54<H>  /  AlkPhos  101  02-21 Patient is a 63y old  Male who presents with a chief complaint of 63M PMH DM, HTN, CKD p/w L foot pain x 3 days. (19 Feb 2018 10:39)    Interval HPI: pt w/o c/o     Vital Signs Last 24 Hrs  T(C): 36.9 (22 Feb 2018 04:40), Max: 36.9 (21 Feb 2018 13:16)  T(F): 98.4 (22 Feb 2018 04:40), Max: 98.5 (21 Feb 2018 13:16)  HR: 72 (22 Feb 2018 04:40) (66 - 78)  BP: 158/75 (22 Feb 2018 04:40) (125/63 - 158/75)  RR: 20 (22 Feb 2018 04:40) (18 - 118)  SpO2: 87% (22 Feb 2018 04:40) (87% - 93%)    I&O's Summary    20 Feb 2018 07:01  -  21 Feb 2018 07:00  --------------------------------------------------------  IN: 500 mL / OUT: 650 mL / NET: -150 mL    21 Feb 2018 07:01  -  22 Feb 2018 06:18  --------------------------------------------------------  IN: 150 mL / OUT: 150 mL / NET: 0 mL    Physical Exam:  Neuro  A&Ox3 VSS  Vascular:   lt toe wound stable remains ischemic   Musculoskeletal: wnl    LABS:                           8.1    9.39  )-----------( 250      ( 21 Feb 2018 06:15 )             24.9       02-21    142  |  105  |  66<H>  ----------------------------<  99  4.4   |  16<L>  |  5.81<H>    Ca    8.3<L>      21 Feb 2018 06:15    TPro  7.1  /  Alb  3.1<L>  /  TBili  0.2  /  DBili  x   /  AST  40  /  ALT  54<H>  /  AlkPhos  101  02-21

## 2018-02-22 NOTE — PROGRESS NOTE ADULT - SUBJECTIVE AND OBJECTIVE BOX
Follow Up:    left foot pain  Interval History/ROS:    Allergies  No Known Allergies        ANTIMICROBIALS:  ampicillin/sulbactam  IVPB 1.5 every 12 hours      OTHER MEDS:  MEDICATIONS  (STANDING):  acetaminophen   Tablet 650 every 6 hours PRN  acetaminophen   Tablet. 650 every 6 hours PRN  atorvastatin 40 at bedtime  benzonatate 100 every 6 hours PRN  dextrose 50% Injectable 12.5 once  dextrose 50% Injectable 25 once  dextrose 50% Injectable 25 once  dextrose Gel 1 once PRN  glucagon  Injectable 1 once PRN  guaiFENesin   Syrup  (Sugar-Free) 100 every 6 hours PRN  influenza   Vaccine 0.5 once  insulin lispro (HumaLOG) corrective regimen sliding scale  three times a day before meals  labetalol 300 three times a day  NIFEdipine XL 30 daily      Vital Signs Last 24 Hrs  T(C): 36.9 (22 Feb 2018 04:40), Max: 36.9 (21 Feb 2018 13:16)  T(F): 98.4 (22 Feb 2018 04:40), Max: 98.5 (21 Feb 2018 13:16)  HR: 72 (22 Feb 2018 04:40) (66 - 78)  BP: 158/75 (22 Feb 2018 04:40) (125/63 - 158/75)  BP(mean): --  RR: 20 (22 Feb 2018 04:40) (18 - 118)  SpO2: 97% (22 Feb 2018 04:40) (92% - 97%)    PHYSICAL EXAM:  Gen: AOx3, NAD, non-toxic, pleasant  CV: S1+S2 normal, no murmurs  Resp: Clear bilat, no resp distress  Abd: Soft, nontender, +BS  Ext: left foot s/p rupture of blister with superficial ulceration, no purulence, no malodor  : No Ortega  IV/Skin: No thrombophlebitis  Neuro: no focal deficits                                7.9    10.83 )-----------( 273      ( 22 Feb 2018 06:40 )             24.3       02-22    142  |  104  |  74<H>  ----------------------------<  91  4.5   |  18<L>  |  6.13<H>    Ca    8.0<L>      22 Feb 2018 06:40    TPro  7.0  /  Alb  2.9<L>  /  TBili  0.2  /  DBili  x   /  AST  40  /  ALT  55<H>  /  AlkPhos  101  02-22          MICROBIOLOGY:  v  BLOOD PERIPHERAL  02-16-18 --  --  --      BLOOD PERIPHERAL  02-15-18 --  --  --      OTHER  02-12-18   MANY  OXICILLIN-RESISTANT staphylococci should be regarded as  RESISTANT to ALL other Beta-Lactams regardless of the  in-vitro results obtained.  These include: ALL  Penicillins, Cephalosporins, Amoxicillin-clavulanic  acid, Ticarcillin-clavulanic acid,  Ampicillin-sulbactam, and Imipenem.  --  Staphylococcus aureus  Staphylococcus haemolyticus  Acinetobacter baumannii  Corynebacterium species      BLOOD VENOUS  02-12-18 --  --  --      BLOOD  02-12-18 --  --  --                RADIOLOGY: Follow Up:    left foot pain    Interval History/ROS: Pt denies any N/V/F, admits C today with continued diarrhea as of this morning. Pt denies any change in condition otherwise, appears more lethargic today than baseline. Admits to increased coughing, nonproductive.    Allergies  No Known Allergies        ANTIMICROBIALS:  ampicillin/sulbactam  IVPB 1.5 every 12 hours      OTHER MEDS:  MEDICATIONS  (STANDING):  acetaminophen   Tablet 650 every 6 hours PRN  acetaminophen   Tablet. 650 every 6 hours PRN  atorvastatin 40 at bedtime  benzonatate 100 every 6 hours PRN  dextrose 50% Injectable 12.5 once  dextrose 50% Injectable 25 once  dextrose 50% Injectable 25 once  dextrose Gel 1 once PRN  glucagon  Injectable 1 once PRN  guaiFENesin   Syrup  (Sugar-Free) 100 every 6 hours PRN  influenza   Vaccine 0.5 once  insulin lispro (HumaLOG) corrective regimen sliding scale  three times a day before meals  labetalol 300 three times a day  NIFEdipine XL 30 daily      Vital Signs Last 24 Hrs  T(C): 36.9 (22 Feb 2018 04:40), Max: 36.9 (21 Feb 2018 13:16)  T(F): 98.4 (22 Feb 2018 04:40), Max: 98.5 (21 Feb 2018 13:16)  HR: 72 (22 Feb 2018 04:40) (66 - 78)  BP: 158/75 (22 Feb 2018 04:40) (125/63 - 158/75)  BP(mean): --  RR: 20 (22 Feb 2018 04:40) (18 - 118)  SpO2: 97% (22 Feb 2018 04:40) (92% - 97%)    PHYSICAL EXAM:  Gen: AOx3, NAD, non-toxic, pleasant  CV: S1+S2 normal, no murmurs  Resp: Clear bilat, no resp distress  Abd: Soft, nontender, +BS  Ext: left foot s/p rupture of blister with superficial ulceration, no purulence, no malodor  : No Ortega  IV/Skin: No thrombophlebitis  Neuro: no focal deficits                                7.9    10.83 )-----------( 273      ( 22 Feb 2018 06:40 )             24.3       02-22    142  |  104  |  74<H>  ----------------------------<  91  4.5   |  18<L>  |  6.13<H>    Ca    8.0<L>      22 Feb 2018 06:40    TPro  7.0  /  Alb  2.9<L>  /  TBili  0.2  /  DBili  x   /  AST  40  /  ALT  55<H>  /  AlkPhos  101  02-22          MICROBIOLOGY:  v  BLOOD PERIPHERAL  02-16-18 --  --  --      BLOOD PERIPHERAL  02-15-18 --  --  --      OTHER  02-12-18   MANY  OXICILLIN-RESISTANT staphylococci should be regarded as  RESISTANT to ALL other Beta-Lactams regardless of the  in-vitro results obtained.  These include: ALL  Penicillins, Cephalosporins, Amoxicillin-clavulanic  acid, Ticarcillin-clavulanic acid,  Ampicillin-sulbactam, and Imipenem.  --  Staphylococcus aureus  Staphylococcus haemolyticus  Acinetobacter baumannii  Corynebacterium species      BLOOD VENOUS  02-12-18 --  --  --      BLOOD  02-12-18 --  --  --                RADIOLOGY:

## 2018-02-22 NOTE — PROGRESS NOTE ADULT - PROBLEM SELECTOR PLAN 1
Pt. with JANY on CKD in the setting of infection and diarrhea. CKD in the setting of long-standing DM. Scr was 1.5 in 3/2017, increased to 2.30 in 7/2017. Scr on admission (2/12) was elevated at 3.81, which has progressively increased to 6.1 today. Pt. non-oliguric. Monitor BMP and urine output. Avoid any potential nephrotoxins. Pt. at increased risk for radiocontrast nephropathy Pt. with JANY on CKD in the setting of infection and diarrhea. CKD in the setting of long-standing DM. Scr was 1.5 in 3/2017, increased to 2.30 in 7/2017. Scr on admission (2/12) was elevated at 3.81, which has progressively increased to 6.1 today. Pt. with likely ATN. Pt. non-oliguric. Monitor BMP and urine output. Avoid any potential nephrotoxins. Pt. at increased risk for radiocontrast nephropathy

## 2018-02-22 NOTE — PROGRESS NOTE ADULT - PROBLEM SELECTOR PLAN 2
Acidosis in the setting of JANY and infection. Serum CO2 increased to 18. Advise to continue with oral sodium bicarbonate 1300 mg TID. Monitor serum CO2 Acidosis in the setting of JANY and infection. Serum CO2 increased to 18. Pt. on oral sodium bicarbonate 1300 mg TID. Monitor serum CO2

## 2018-02-22 NOTE — PROGRESS NOTE ADULT - SUBJECTIVE AND OBJECTIVE BOX
Progress Note    JAMES PATRICK 63y (1955) Male [id number]  02-12-18 (10d)    DORON Thibodeaux PGY1  Pager# [phone]    Chief Complaint: 63M PMH DM, HTN, CKD p/w L foot pain x 3 days.    Subjective:  No acute events overnight. Patient seen and examined at bedside. Pt reports having 3 episodes of non-bloody water diarrhea yesterday. Reports decrease in urine production. Reports continued swelling of leg, but no pain of foot. Able to walk on foot with assistance. Cough persists, dry. No nausea or vomiting.     Review of Systems:  CONSTITUTIONAL: No fever, weight loss, or fatigue  EYES: No eye pain, visual disturbances, or discharge  ENMT:  No difficulty hearing, tinnitus, vertigo; No sinus or throat pain  NECK: No pain or stiffness  RESPIRATORY: +Cough. No wheezing, chills or hemoptysis; No shortness of breath  CARDIOVASCULAR: No chest pain, palpitations, dizziness, or leg swelling  GASTROINTESTINAL: +Diarrhea. No abdominal or epigastric pain. No nausea, vomiting, or hematemesis. No melena or hematochezia.  GENITOURINARY: +decreased urine output.   NEUROLOGICAL: No headaches, memory loss, loss of strength, numbness, or tremors  SKIN: No itching, burning, rashes, or lesions   ENDOCRINE: No heat or cold intolerance; No hair loss  MUSCULOSKELETAL: No joint pain or swelling; No muscle, back, or extremity pain      PAST MEDICAL & SURGICAL HISTORY:  Kidney disease (N28.9)  HTN (hypertension) (I10)  DM (diabetes mellitus) (E11.9)  S/P BKA (below knee amputation) unilateral, right (Z89.511)  No significant past surgical history ([id number])    acetaminophen   Tablet 650 milliGRAM(s) Oral every 6 hours PRN  acetaminophen   Tablet. 650 milliGRAM(s) Oral every 6 hours PRN  ampicillin/sulbactam  IVPB 1.5 Gram(s) IV Intermittent every 12 hours  atorvastatin 40 milliGRAM(s) Oral at bedtime  benzonatate 100 milliGRAM(s) Oral every 6 hours PRN  cyanocobalamin 1000 MICROGram(s) Oral daily  dextrose 5%. 1000 milliLiter(s) IV Continuous <Continuous>  dextrose 50% Injectable 12.5 Gram(s) IV Push once  dextrose 50% Injectable 25 Gram(s) IV Push once  dextrose 50% Injectable 25 Gram(s) IV Push once  dextrose Gel 1 Dose(s) Oral once PRN  glucagon  Injectable 1 milliGRAM(s) IntraMuscular once PRN  guaiFENesin   Syrup  (Sugar-Free) 100 milliGRAM(s) Oral every 6 hours PRN  influenza   Vaccine 0.5 milliLiter(s) IntraMuscular once  insulin lispro (HumaLOG) corrective regimen sliding scale   SubCutaneous three times a day before meals  labetalol 300 milliGRAM(s) Oral three times a day  lactobacillus acidophilus 1 Tablet(s) Oral daily  NIFEdipine XL 30 milliGRAM(s) Oral daily  silver sulfADIAZINE 1% Cream 1 Application(s) Topical daily  sodium bicarbonate 1300 milliGRAM(s) Oral three times a day    Objective:  T(C): 36.9 (02-22-18 @ 04:40), Max: 36.9 (02-21-18 @ 13:16)  HR: 72 (02-22-18 @ 04:40) (66 - 78)  BP: 158/75 (02-22-18 @ 04:40) (125/63 - 158/75)  RR: 20 (02-22-18 @ 04:40) (18 - 118)  SpO2: 97% (02-22-18 @ 04:40) (92% - 97%)    Physical exam:  GENERAL: NAD, well-developed  HEAD:  Atraumatic, Normocephalic  EYES: EOMI, PERRLA, conjunctiva and sclera clear  NECK: Supple, No JVD  CHEST/LUNG: Lungs have decreased breath sounds at the bases. No wheezes or crackles auscultated.   HEART: Regular rate and rhythm; No murmurs, rubs, or gallops  ABDOMEN: Soft, Nontender, Nondistended; Bowel sounds present  EXTREMITIES:  Blister still present. Some drainage, but no bleeding. Leg is still edematous.   PSYCH: AAOx3  NEUROLOGY: non-focal  SKIN: No rashes or lesions      02-21-18 @ 07:01  -  02-22-18 @ 07:00  --------------------------------------------------------  IN: 150 mL / OUT: 150 mL / NET: 0 mL        CAPILLARY BLOOD GLUCOSE      (02-22 @ 06:40)                      7.9  10.83 )-----------( 273                 24.3    Neutrophils = -- (--%)  Lymphocytes = -- (--%)  Eosinophils = -- (--%)  Basophils = -- (--%)  Monocytes = -- (--%)  Bands = --%    02-22    142  |  104  |  74<H>  ----------------------------<  91  4.5   |  18<L>  |  6.13<H>    Ca    8.0<L>      22 Feb 2018 06:40    TPro  7.0  /  Alb  2.9<L>  /  TBili  0.2  /  DBili  x   /  AST  40  /  ALT  55<H>  /  AlkPhos  101  02-22          RVP:          Tox:             WBC Trend: 10.83<--, 9.39<--, 9.55<--    Hb Trend: 7.9<--, 8.1<--, 7.8<--, 8.7<--, 8.3<--        New imaging in last 24 hours:  Consult notes reviewed:

## 2018-02-22 NOTE — PROGRESS NOTE ADULT - SUBJECTIVE AND OBJECTIVE BOX
[facility] DIVISION OF KIDNEY DISEASES AND HYPERTENSION -- FOLLOW UP NOTE  --------------------------------------------------------------------------------  HPI: 63-year-old male with PMH of HTN, DM, right BKA, and CKD admitted for left foot infection. As per review of labs on [place]/[employer], Scr was 1.51 in 3/2017. As her pt. PMD's office, Scr checked in 7/2017 was 2.30.  Labs on admission (2/12) showed elevated Scr of 3.8 which has progressively increased to 6.1 today.  Pt. currently resting in bed and denies SOB, CP, or N/V.       PAST HISTORY  --------------------------------------------------------------------------------  No significant changes to PMH, PSH, FHx, SHx, unless otherwise noted    ALLERGIES & MEDICATIONS  --------------------------------------------------------------------------------  Allergies    No Known Allergies    Intolerances      Standing Inpatient Medications  ampicillin/sulbactam  IVPB 1.5 Gram(s) IV Intermittent every 12 hours  atorvastatin 40 milliGRAM(s) Oral at bedtime  cyanocobalamin 1000 MICROGram(s) Oral daily  dextrose 5%. 1000 milliLiter(s) IV Continuous <Continuous>  dextrose 50% Injectable 12.5 Gram(s) IV Push once  dextrose 50% Injectable 25 Gram(s) IV Push once  dextrose 50% Injectable 25 Gram(s) IV Push once  influenza   Vaccine 0.5 milliLiter(s) IntraMuscular once  insulin lispro (HumaLOG) corrective regimen sliding scale   SubCutaneous three times a day before meals  labetalol 300 milliGRAM(s) Oral three times a day  lactobacillus acidophilus 1 Tablet(s) Oral daily  NIFEdipine XL 30 milliGRAM(s) Oral daily  silver sulfADIAZINE 1% Cream 1 Application(s) Topical daily  sodium bicarbonate 1300 milliGRAM(s) Oral three times a day      REVIEW OF SYSTEMS  --------------------------------------------------------------------------------  General: no fever  CVS: no chest pain  RESP: no SOB  ABD: no abdominal pain  : no dysuria  MSK: no edema     VITALS/PHYSICAL EXAM  --------------------------------------------------------------------------------  T(C): 36.9 (02-22-18 @ 04:40), Max: 36.9 (02-21-18 @ 13:16)  HR: 72 (02-22-18 @ 04:40) (66 - 78)  BP: 158/75 (02-22-18 @ 04:40) (125/63 - 158/75)  RR: 20 (02-22-18 @ 04:40) (18 - 118)  SpO2: 97% (02-22-18 @ 04:40) (92% - 97%)  Wt(kg): --        02-21-18 @ 07:01  -  02-22-18 @ 07:00  --------------------------------------------------------  IN: 150 mL / OUT: 150 mL / NET: 0 mL      Physical Exam:  	Gen: Resting in bed   	HEENT: No JVD  	Pulm: CTA B/L  	CV: S1S2+  	Abd:  soft  	LE: Right BKA, left foot dressing seen   	Neuro: Awake and alert   	Psych: Normal affect and mood  	Skin: Warm      LABS/STUDIES  --------------------------------------------------------------------------------              7.9    10.83 >-----------<  273      [02-22-18 @ 06:40]              24.3     142  |  104  |  74  ----------------------------<  91      [02-22-18 @ 06:40]  4.5   |  18  |  6.13        Ca     8.0     [02-22-18 @ 06:40]    TPro  7.0  /  Alb  2.9  /  TBili  0.2  /  DBili  x   /  AST  40  /  ALT  55  /  AlkPhos  101  [02-22-18 @ 06:40]          Creatinine Trend:  SCr 6.13 [02-22 @ 06:40]  SCr 5.81 [02-21 @ 06:15]  SCr 5.30 [02-20 @ 05:00]  SCr 5.08 [02-19 @ 05:00]  SCr 4.74 [02-18 @ 06:15] [facility] DIVISION OF KIDNEY DISEASES AND HYPERTENSION -- FOLLOW UP NOTE  --------------------------------------------------------------------------------  HPI: 63-year-old male with PMH of HTN, DM, right BKA, and CKD admitted for left foot infection. As per review of labs on [place]/[employer], Scr was 1.51 in 3/2017. As her pt. PMD's office, Scr checked in 7/2017 was 2.30. Labs on admission (2/12) showed elevated Scr of 3.8 which has progressively increased to 6.1 today.  Pt. currently resting in bed and denies SOB, CP, or N/V.     PAST HISTORY  --------------------------------------------------------------------------------  No significant changes to PMH, PSH, FHx, SHx, unless otherwise noted    ALLERGIES & MEDICATIONS  --------------------------------------------------------------------------------  Allergies    No Known Allergies    Standing Inpatient Medications  ampicillin/sulbactam  IVPB 1.5 Gram(s) IV Intermittent every 12 hours  atorvastatin 40 milliGRAM(s) Oral at bedtime  cyanocobalamin 1000 MICROGram(s) Oral daily  dextrose 5%. 1000 milliLiter(s) IV Continuous <Continuous>  dextrose 50% Injectable 12.5 Gram(s) IV Push once  dextrose 50% Injectable 25 Gram(s) IV Push once  dextrose 50% Injectable 25 Gram(s) IV Push once  influenza   Vaccine 0.5 milliLiter(s) IntraMuscular once  insulin lispro (HumaLOG) corrective regimen sliding scale   SubCutaneous three times a day before meals  labetalol 300 milliGRAM(s) Oral three times a day  lactobacillus acidophilus 1 Tablet(s) Oral daily  NIFEdipine XL 30 milliGRAM(s) Oral daily  silver sulfADIAZINE 1% Cream 1 Application(s) Topical daily  sodium bicarbonate 1300 milliGRAM(s) Oral three times a day    REVIEW OF SYSTEMS  --------------------------------------------------------------------------------  General: no fever  CVS: no chest pain  RESP: no SOB  ABD: no abdominal pain  : no dysuria  MSK: Left leg edema/foot infection    VITALS/PHYSICAL EXAM  --------------------------------------------------------------------------------  T(C): 36.9 (02-22-18 @ 04:40), Max: 36.9 (02-21-18 @ 13:16)  HR: 72 (02-22-18 @ 04:40) (66 - 78)  BP: 158/75 (02-22-18 @ 04:40) (125/63 - 158/75)  RR: 20 (02-22-18 @ 04:40) (18 - 118)  SpO2: 97% (02-22-18 @ 04:40) (92% - 97%)  Wt(kg): --    02-21-18 @ 07:01  -  02-22-18 @ 07:00  --------------------------------------------------------  IN: 150 mL / OUT: 150 mL / NET: 0 mL    Physical Exam:  	Gen: Resting in bed   	HEENT: No JVD  	Pulm: CTA B/L  	CV: S1S2+  	Abd:  soft  	LE: Right BKA, LLE edema present, left foot dressing seen   	Neuro: Awake and alert   	Psych: Normal affect and mood  	Skin: Warm    LABS/STUDIES  --------------------------------------------------------------------------------              7.9    10.83 >-----------<  273      [02-22-18 @ 06:40]              24.3     142  |  104  |  74  ----------------------------<  91      [02-22-18 @ 06:40]  4.5   |  18  |  6.13        Ca     8.0     [02-22-18 @ 06:40]    TPro  7.0  /  Alb  2.9  /  TBili  0.2  /  DBili  x   /  AST  40  /  ALT  55  /  AlkPhos  101  [02-22-18 @ 06:40]    Creatinine Trend:  SCr 6.13 [02-22 @ 06:40]  SCr 5.81 [02-21 @ 06:15]  SCr 5.30 [02-20 @ 05:00]  SCr 5.08 [02-19 @ 05:00]  SCr 4.74 [02-18 @ 06:15]

## 2018-02-22 NOTE — PROGRESS NOTE ADULT - ATTENDING COMMENTS
Patient seen and examined.  Case discussed with house staff.  Agree with above as edited.  Patient with left foot infection with severe sepsis and lactic acidosis with JANY on CKD III.  Vascular, podiatry, nephrology and infectious disease input appreciated.  Sepsis 2/2 cellulitis- continue  with IV unasyn. Today will be last day. d/w ID  JANY on CKD III - ATN from underlying sepsis - cr  is continuing to rise. Renal following. monitor urine output - d/w RN. Avoiding nephrotoxins.   Peripheral vascular disease - Appreciate vascular f/u - would defer angio until Cr stable

## 2018-02-22 NOTE — PROGRESS NOTE ADULT - ASSESSMENT
64yo M with DM and R BKA, now presenting with Diabetic foot soft tissue infection. No evidence of necrotizing fasciitis or systemic infection. Patient with rising Cr despite medical efforts.    - Wound care as per podiatry  - will follow up Renal for optimal time for angio    MERCEDEZ Ba MD PGYII [id number]

## 2018-02-23 LAB
BUN SERPL-MCNC: 76 MG/DL — HIGH (ref 7–23)
CALCIUM SERPL-MCNC: 8.3 MG/DL — LOW (ref 8.4–10.5)
CHLORIDE SERPL-SCNC: 102 MMOL/L — SIGNIFICANT CHANGE UP (ref 98–107)
CO2 SERPL-SCNC: 17 MMOL/L — LOW (ref 22–31)
CREAT SERPL-MCNC: 6.3 MG/DL — HIGH (ref 0.5–1.3)
GLUCOSE BLDC GLUCOMTR-MCNC: 129 MG/DL — HIGH (ref 70–99)
GLUCOSE BLDC GLUCOMTR-MCNC: 133 MG/DL — HIGH (ref 70–99)
GLUCOSE BLDC GLUCOMTR-MCNC: 135 MG/DL — HIGH (ref 70–99)
GLUCOSE BLDC GLUCOMTR-MCNC: 206 MG/DL — HIGH (ref 70–99)
GLUCOSE SERPL-MCNC: 103 MG/DL — HIGH (ref 70–99)
HCT VFR BLD CALC: 24.6 % — LOW (ref 39–50)
HGB BLD-MCNC: 8 G/DL — LOW (ref 13–17)
MCHC RBC-ENTMCNC: 28.2 PG — SIGNIFICANT CHANGE UP (ref 27–34)
MCHC RBC-ENTMCNC: 32.5 % — SIGNIFICANT CHANGE UP (ref 32–36)
MCV RBC AUTO: 86.6 FL — SIGNIFICANT CHANGE UP (ref 80–100)
NRBC # FLD: 0 — SIGNIFICANT CHANGE UP
PLATELET # BLD AUTO: 317 K/UL — SIGNIFICANT CHANGE UP (ref 150–400)
PMV BLD: 11.4 FL — SIGNIFICANT CHANGE UP (ref 7–13)
POTASSIUM SERPL-MCNC: 4.3 MMOL/L — SIGNIFICANT CHANGE UP (ref 3.5–5.3)
POTASSIUM SERPL-SCNC: 4.3 MMOL/L — SIGNIFICANT CHANGE UP (ref 3.5–5.3)
RBC # BLD: 2.84 M/UL — LOW (ref 4.2–5.8)
RBC # FLD: 14.4 % — SIGNIFICANT CHANGE UP (ref 10.3–14.5)
SODIUM SERPL-SCNC: 142 MMOL/L — SIGNIFICANT CHANGE UP (ref 135–145)
WBC # BLD: 10.89 K/UL — HIGH (ref 3.8–10.5)
WBC # FLD AUTO: 10.89 K/UL — HIGH (ref 3.8–10.5)

## 2018-02-23 PROCEDURE — 99233 SBSQ HOSP IP/OBS HIGH 50: CPT | Mod: GC

## 2018-02-23 PROCEDURE — 99232 SBSQ HOSP IP/OBS MODERATE 35: CPT

## 2018-02-23 PROCEDURE — 99231 SBSQ HOSP IP/OBS SF/LOW 25: CPT

## 2018-02-23 RX ADMIN — Medication 300 MILLIGRAM(S): at 13:02

## 2018-02-23 RX ADMIN — Medication 1 TABLET(S): at 13:02

## 2018-02-23 RX ADMIN — Medication 300 MILLIGRAM(S): at 21:28

## 2018-02-23 RX ADMIN — AMPICILLIN SODIUM AND SULBACTAM SODIUM 100 GRAM(S): 250; 125 INJECTION, POWDER, FOR SUSPENSION INTRAMUSCULAR; INTRAVENOUS at 05:49

## 2018-02-23 RX ADMIN — Medication 1 APPLICATION(S): at 08:09

## 2018-02-23 RX ADMIN — Medication 1300 MILLIGRAM(S): at 13:02

## 2018-02-23 RX ADMIN — Medication 1300 MILLIGRAM(S): at 21:27

## 2018-02-23 RX ADMIN — Medication 1300 MILLIGRAM(S): at 05:49

## 2018-02-23 RX ADMIN — Medication 30 MILLIGRAM(S): at 05:49

## 2018-02-23 RX ADMIN — PREGABALIN 1000 MICROGRAM(S): 225 CAPSULE ORAL at 13:02

## 2018-02-23 RX ADMIN — ATORVASTATIN CALCIUM 40 MILLIGRAM(S): 80 TABLET, FILM COATED ORAL at 21:28

## 2018-02-23 RX ADMIN — Medication 300 MILLIGRAM(S): at 05:49

## 2018-02-23 NOTE — PROGRESS NOTE ADULT - SUBJECTIVE AND OBJECTIVE BOX
Patient is a 63y old  Male who presents with a chief complaint of 63M PMH DM, HTN, CKD p/w L foot pain x 3 days. (19 Feb 2018 10:39)       INTERVAL HPI/OVERNIGHT EVENTS:  Patient seen and evaluated at bedside.  Pt is resting comfortable in NAD. Denies N/V/F/C.  Pain rated at X/10    Allergies    No Known Allergies    Intolerances        Vital Signs Last 24 Hrs  T(C): 36.6 (23 Feb 2018 05:07), Max: 36.8 (22 Feb 2018 21:18)  T(F): 97.9 (23 Feb 2018 05:07), Max: 98.3 (22 Feb 2018 21:18)  HR: 79 (23 Feb 2018 05:07) (64 - 79)  BP: 144/64 (23 Feb 2018 05:07) (129/61 - 152/62)  BP(mean): --  RR: 16 (23 Feb 2018 05:07) (16 - 19)  SpO2: 100% (23 Feb 2018 05:07) (97% - 100%)    LABS:                        7.9    10.83 )-----------( 273      ( 22 Feb 2018 06:40 )             24.3     02-22    142  |  104  |  74<H>  ----------------------------<  91  4.5   |  18<L>  |  6.13<H>    Ca    8.0<L>      22 Feb 2018 06:40    TPro  7.0  /  Alb  2.9<L>  /  TBili  0.2  /  DBili  x   /  AST  40  /  ALT  55<H>  /  AlkPhos  101  02-22        CAPILLARY BLOOD GLUCOSE      POCT Blood Glucose.: 129 mg/dL (23 Feb 2018 08:23)  POCT Blood Glucose.: 99 mg/dL (22 Feb 2018 23:02)  POCT Blood Glucose.: 110 mg/dL (22 Feb 2018 17:10)  POCT Blood Glucose.: 202 mg/dL (22 Feb 2018 12:17)      Lower Extremity Physical Exam:  Vasular: DP/PT 0/4, B/L, CFT <2 seconds B/L, Temperature gradient warm, B/L.   Neuro: Epicritic sensation absent to the level of toes, B/L.  Musculoskeletal/Ortho:  Skin: Left foot deroofed blister with cherry red non-blanchable trophic changes to plantar aspect of toes 1-5, forefoot, midfoot, no ascending erythema or swelling, no malodor, no purulence, no deep probe, etiology unknown, ROM of toes intact, CFT to each toes normal, no sinus tract, no undermining. Overall appearance of foot improving.    RADIOLOGY & ADDITIONAL TESTS:

## 2018-02-23 NOTE — PROGRESS NOTE ADULT - ASSESSMENT
64yo M with DM and R BKA, now presenting with Diabetic foot soft tissue infection. No evidence of necrotizing fasciitis or systemic infection. Patient with rising Cr despite medical efforts.    - Wound care as per podiatry  - will follow up Renal for optimal time for angio    MERCEDEZ Ba MD PGYII [phone]

## 2018-02-23 NOTE — PROGRESS NOTE ADULT - PROBLEM SELECTOR PLAN 1
Pt. with JANY on CKD in the setting of infection and diarrhea. CKD in the setting of long-standing DM. Scr was 1.5 in 3/2017, increased to 2.30 in 7/2017. Scr on admission (2/12) was elevated at 3.81, which is elevated however stable at 6.3 today. Pt. with likely ATN. Pt. non-oliguric. Discussed with pt. that if his Scr continues to worsen may need to consider dialysis. Monitor BMP and urine output. Avoid any potential nephrotoxins. Pt. at increased risk for radiocontrast nephropathy Pt. with JANY on CKD in the setting of infection and diarrhea. CKD in the setting of long-standing DM. Scr was 1.5 in 3/2017, increased to 2.30 in 7/2017. Scr on admission (2/12) was elevated at 3.81, which is elevated however stable at 6.3 today. Pt. with likely ATN. Will need to consider dialysis/HD if Scr continues to worsen. Monitor BMP and urine output. Avoid any potential nephrotoxins. Pt. at increased risk for radiocontrast nephropathy

## 2018-02-23 NOTE — PROGRESS NOTE ADULT - SUBJECTIVE AND OBJECTIVE BOX
Patient is a 63y old  Male who presents with a chief complaint of 63M PMH DM, HTN, CKD p/w L foot pain x 3 days. (19 Feb 2018 10:39)      Vascular Surgery Attending Progress Note    Interval HPI: pt w/o c/o     Medications:  acetaminophen   Tablet 650 milliGRAM(s) Oral every 6 hours PRN  acetaminophen   Tablet. 650 milliGRAM(s) Oral every 6 hours PRN  atorvastatin 40 milliGRAM(s) Oral at bedtime  benzonatate 100 milliGRAM(s) Oral every 6 hours PRN  cyanocobalamin 1000 MICROGram(s) Oral daily  dextrose 5%. 1000 milliLiter(s) IV Continuous <Continuous>  dextrose 50% Injectable 12.5 Gram(s) IV Push once  dextrose 50% Injectable 25 Gram(s) IV Push once  dextrose 50% Injectable 25 Gram(s) IV Push once  dextrose Gel 1 Dose(s) Oral once PRN  glucagon  Injectable 1 milliGRAM(s) IntraMuscular once PRN  guaiFENesin   Syrup  (Sugar-Free) 100 milliGRAM(s) Oral every 6 hours PRN  influenza   Vaccine 0.5 milliLiter(s) IntraMuscular once  insulin lispro (HumaLOG) corrective regimen sliding scale   SubCutaneous three times a day before meals  labetalol 300 milliGRAM(s) Oral three times a day  lactobacillus acidophilus 1 Tablet(s) Oral daily  NIFEdipine XL 30 milliGRAM(s) Oral daily  silver sulfADIAZINE 1% Cream 1 Application(s) Topical daily  sodium bicarbonate 1300 milliGRAM(s) Oral three times a day      Vital Signs Last 24 Hrs  T(C): 36.7 (23 Feb 2018 12:47), Max: 36.8 (22 Feb 2018 21:18)  T(F): 98 (23 Feb 2018 12:47), Max: 98.3 (22 Feb 2018 21:18)  HR: 66 (23 Feb 2018 12:47) (66 - 79)  BP: 158/64 (23 Feb 2018 12:47) (144/64 - 158/64)  BP(mean): --  RR: 17 (23 Feb 2018 12:47) (16 - 18)  SpO2: 100% (23 Feb 2018 12:47) (97% - 100%)  I&O's Summary    22 Feb 2018 07:01  -  23 Feb 2018 07:00  --------------------------------------------------------  IN: 450 mL / OUT: 250 mL / NET: 200 mL    23 Feb 2018 07:01  -  23 Feb 2018 20:39  --------------------------------------------------------  IN: 0 mL / OUT: 150 mL / NET: -150 mL        Physical Exam:  Neuro  A&Ox3 VSS  Vascular:   lt toe 1 wound stable remains ischemic    LABS:                        8.0    10.89 )-----------( 317      ( 23 Feb 2018 07:30 )             24.6     02-23    142  |  102  |  76<H>  ----------------------------<  103<H>  4.3   |  17<L>  |  6.30<H>    Ca    8.3<L>      23 Feb 2018 07:30    TPro  7.0  /  Alb  2.9<L>  /  TBili  0.2  /  DBili  x   /  AST  40  /  ALT  55<H>  /  AlkPhos  101  02-22        BARI NICOLAS MD  [phone]

## 2018-02-23 NOTE — PROGRESS NOTE ADULT - ASSESSMENT
63M with DM p/w foot pain.  Noted to have extensive blistering of the plantar foot with superficial ulceration, but no purulence, malodor or cellulitis.  JANY on CKD, now with diarrhea.    completed course of unasyn today  Cdiff negative  recall as needed

## 2018-02-23 NOTE — PROGRESS NOTE ADULT - SUBJECTIVE AND OBJECTIVE BOX
Follow Up:      Interval History/ROS:Patient is a 63y old  Male who presents with a chief complaint of 63M PMH DM, HTN, CKD p/w L foot pain x 3 days. (19 Feb 2018 10:39)    still with diarrhea but Cdiff negative, no fever/chills    Allergies    No Known Allergies    Intolerances        ANTIMICROBIALS:      OTHER MEDS:  acetaminophen   Tablet 650 milliGRAM(s) Oral every 6 hours PRN  acetaminophen   Tablet. 650 milliGRAM(s) Oral every 6 hours PRN  atorvastatin 40 milliGRAM(s) Oral at bedtime  benzonatate 100 milliGRAM(s) Oral every 6 hours PRN  cyanocobalamin 1000 MICROGram(s) Oral daily  dextrose 5%. 1000 milliLiter(s) IV Continuous <Continuous>  dextrose 50% Injectable 12.5 Gram(s) IV Push once  dextrose 50% Injectable 25 Gram(s) IV Push once  dextrose 50% Injectable 25 Gram(s) IV Push once  dextrose Gel 1 Dose(s) Oral once PRN  glucagon  Injectable 1 milliGRAM(s) IntraMuscular once PRN  guaiFENesin   Syrup  (Sugar-Free) 100 milliGRAM(s) Oral every 6 hours PRN  influenza   Vaccine 0.5 milliLiter(s) IntraMuscular once  insulin lispro (HumaLOG) corrective regimen sliding scale   SubCutaneous three times a day before meals  labetalol 300 milliGRAM(s) Oral three times a day  lactobacillus acidophilus 1 Tablet(s) Oral daily  NIFEdipine XL 30 milliGRAM(s) Oral daily  silver sulfADIAZINE 1% Cream 1 Application(s) Topical daily  sodium bicarbonate 1300 milliGRAM(s) Oral three times a day      Vital Signs Last 24 Hrs  T(C): 36.7 (23 Feb 2018 12:47), Max: 36.8 (22 Feb 2018 21:18)  T(F): 98 (23 Feb 2018 12:47), Max: 98.3 (22 Feb 2018 21:18)  HR: 66 (23 Feb 2018 12:47) (66 - 79)  BP: 158/64 (23 Feb 2018 12:47) (144/64 - 158/64)  BP(mean): --  RR: 17 (23 Feb 2018 12:47) (16 - 18)  SpO2: 100% (23 Feb 2018 12:47) (97% - 100%)    PHYSICAL EXAM:    Constitutional: non-toxic    Eyes: non-icteric    ENMT: oropharynx clear    Neck: no SMITA    Respiratory: clear to auscultation b/l    Cardiovascular: S1/S2, no murmur    Gastrointestinal: soft, NT/ND, normal BS    Genitourinary: no cervantes    Vascular: no edema    Neurological: non-focal    Skin: no rash, no phlebitis, wound clean    Psychiatric: alert and oriented, affect appropriate                          8.0    10.89 )-----------( 317      ( 23 Feb 2018 07:30 )             24.6       02-23    142  |  102  |  76<H>  ----------------------------<  103<H>  4.3   |  17<L>  |  6.30<H>    Ca    8.3<L>      23 Feb 2018 07:30    TPro  7.0  /  Alb  2.9<L>  /  TBili  0.2  /  DBili  x   /  AST  40  /  ALT  55<H>  /  AlkPhos  101  02-22        MICROBIOLOGY:  RECENT CULTURES:    Cdiff negative

## 2018-02-23 NOTE — PROGRESS NOTE ADULT - PROBLEM SELECTOR PLAN 2
Acidosis in the setting of JANY and infection. Serum CO2 low at 17. Pt. on oral sodium bicarbonate 1300 mg TID. Monitor serum CO2

## 2018-02-23 NOTE — PROGRESS NOTE ADULT - ATTENDING COMMENTS
Patient seen and examined.  Case discussed with house staff.  Agree with above as edited.  Patient admitted with left foot cellulitis with severe sepsis and lactic acidosis with JANY on CKD III.  Vascular, podiatry, nephrology and infectious disease input appreciated.  Sepsis 2/2 cellulitis- now s/p full course with IV unasyn. ID following.  Diarrhea - cdiff negative - suspect abx associated diarrhea - monitor off abx  JANY on CKD III - ATN from underlying sepsis - cr  is continuing to rise. Renal following. monitoring urine output. Avoiding nephrotoxins.   Peripheral vascular disease with h/o R BKA - Appreciate vascular f/u - would defer angio until Cr stable

## 2018-02-23 NOTE — PROGRESS NOTE ADULT - PROBLEM SELECTOR PLAN 1
-L foot with cellulitis with resulting sepsis.  -ID is following; recs appreciated. S/p vancomycin and zosyn. Completed Unasyn on 2/22.  -Podiatry has evaluated patient and report good improvement. recs appreciated.   -Surgery has been consulted and are following. Want an angiogram; scheduling for when creatinine is stable.   -Wound culture growing Acinetobacter, corynebacterium, staph haemolyticus and staph aureus. Blood cx with NGTD.  -LLE xrays negative for free air; CT read negative for free air.

## 2018-02-23 NOTE — PROGRESS NOTE ADULT - SUBJECTIVE AND OBJECTIVE BOX
Progress Note    JAMES PATRICK 63y (1955) Male [id number]  02-12-18 (11d)    Dr. Galvez [facility] PGY1  Pager# [phone]    Chief Complaint: 63M PMH DM, HTN, CKD p/w L foot pain x 3 days.    Subjective:  No acute events overnight. Patient seen and examined at bedside. Pt still having diarrhea, but without blood. No nausea, vomiting, or abdominal pain. No fevers or chills. Continues to have non-productive cough. Denies any associated sore throat, nasal congestion.    Review of Systems:  CONSTITUTIONAL: No fever, weight loss, or fatigue  EYES: No eye pain, visual disturbances, or discharge  ENMT:  No difficulty hearing, tinnitus, vertigo; No sinus or throat pain  NECK: No pain or stiffness  RESPIRATORY: +cough. no sob  CARDIOVASCULAR: No chest pain, palpitations, dizziness, or leg swelling  GASTROINTESTINAL: No abdominal or epigastric pain. No nausea, vomiting, or hematemesis; +diarrhea. No constipation. No melena or hematochezia.  GENITOURINARY: No dysuria, frequency, hematuria, or incontinence  NEUROLOGICAL: No headaches, memory loss, loss of strength, numbness, or tremors  SKIN: +wound on left foot  MUSCULOSKELETAL: No pain of foot      PAST MEDICAL & SURGICAL HISTORY:  Kidney disease (N28.9)  HTN (hypertension) (I10)  DM (diabetes mellitus) (E11.9)  S/P BKA (below knee amputation) unilateral, right (Z89.511)  No significant past surgical history (261666705)    acetaminophen   Tablet 650 milliGRAM(s) Oral every 6 hours PRN  acetaminophen   Tablet. 650 milliGRAM(s) Oral every 6 hours PRN  atorvastatin 40 milliGRAM(s) Oral at bedtime  benzonatate 100 milliGRAM(s) Oral every 6 hours PRN  cyanocobalamin 1000 MICROGram(s) Oral daily  dextrose 5%. 1000 milliLiter(s) IV Continuous <Continuous>  dextrose 50% Injectable 12.5 Gram(s) IV Push once  dextrose 50% Injectable 25 Gram(s) IV Push once  dextrose 50% Injectable 25 Gram(s) IV Push once  dextrose Gel 1 Dose(s) Oral once PRN  glucagon  Injectable 1 milliGRAM(s) IntraMuscular once PRN  guaiFENesin   Syrup  (Sugar-Free) 100 milliGRAM(s) Oral every 6 hours PRN  influenza   Vaccine 0.5 milliLiter(s) IntraMuscular once  insulin lispro (HumaLOG) corrective regimen sliding scale   SubCutaneous three times a day before meals  labetalol 300 milliGRAM(s) Oral three times a day  lactobacillus acidophilus 1 Tablet(s) Oral daily  NIFEdipine XL 30 milliGRAM(s) Oral daily  silver sulfADIAZINE 1% Cream 1 Application(s) Topical daily  sodium bicarbonate 1300 milliGRAM(s) Oral three times a day    Objective:  T(C): 36.7 (02-23-18 @ 12:47), Max: 36.8 (02-22-18 @ 21:18)  HR: 66 (02-23-18 @ 12:47) (64 - 79)  BP: 158/64 (02-23-18 @ 12:47) (129/61 - 158/64)  RR: 17 (02-23-18 @ 12:47) (16 - 19)  SpO2: 100% (02-23-18 @ 12:47) (97% - 100%)    Physical exam:  GENERAL: NAD, well-developed.   HEAD:  Atraumatic, Normocephalic  EYES: EOMI, PERRLA, conjunctiva and sclera clear  NECK: Supple, No JVD  CHEST/LUNG: Clear to auscultation bilaterally with decreased breath sounds at the bases.  HEART: Regular rate and rhythm; No murmurs, rubs, or gallops  ABDOMEN: Soft, Nontender, Nondistended; Bowel sounds present  EXTREMITIES:  LLE: wound is healing, although some new blisters are present- very flat, not too fluid filled. Some drainage on gauze. Not malodorous.  PSYCH: AAOx3  NEUROLOGY: non-focal  SKIN: See extremities exam      02-22-18 @ 07:01  -  02-23-18 @ 07:00  --------------------------------------------------------  IN: 450 mL / OUT: 250 mL / NET: 200 mL        CAPILLARY BLOOD GLUCOSE      (02-23 @ 07:30)                      8.0  10.89 )-----------( 317                 24.6    Neutrophils = -- (--%)  Lymphocytes = -- (--%)  Eosinophils = -- (--%)  Basophils = -- (--%)  Monocytes = -- (--%)  Bands = --%    02-23    142  |  102  |  76<H>  ----------------------------<  103<H>  4.3   |  17<L>  |  6.30<H>    Ca    8.3<L>      23 Feb 2018 07:30    TPro  7.0  /  Alb  2.9<L>  /  TBili  0.2  /  DBili  x   /  AST  40  /  ALT  55<H>  /  AlkPhos  101  02-22      WBC Trend: 10.89<--, 10.83<--, 9.39<--    Hb Trend: 8.0<--, 7.9<--, 8.1<--, 7.8<--, 8.7<--        New imaging in last 24 hours: none  Consult notes reviewed: nephrology, podiatry, infectious disease, vascular Progress Note    JAMES PATRICK 63y (1955) Male [id number]  02-12-18 (11d)    Dr. Galvez [facility] PGY1  Pager# [phone]    Chief Complaint: 63M PMH DM, HTN, CKD p/w L foot pain x 3 days.    Subjective:  No acute events overnight. Patient seen and examined at bedside. Pt still having diarrhea, but without blood. No nausea, vomiting, or abdominal pain. No fevers or chills. Continues to have non-productive cough. Denies any associated sore throat, nasal congestion.    Review of Systems:  CONSTITUTIONAL: No fever, weight loss, or fatigue  EYES: No eye pain, visual disturbances, or discharge  ENMT:  No difficulty hearing, tinnitus, vertigo; No sinus or throat pain  NECK: No pain or stiffness  RESPIRATORY: +cough. no sob  CARDIOVASCULAR: No chest pain, palpitations, dizziness, or leg swelling  GASTROINTESTINAL: No abdominal or epigastric pain. No nausea, vomiting, or hematemesis; +diarrhea. No constipation. No melena or hematochezia.  GENITOURINARY: No dysuria, frequency, hematuria, or incontinence  NEUROLOGICAL: No headaches, memory loss, loss of strength, numbness, or tremors  SKIN: +wound on left foot  MUSCULOSKELETAL: No pain of foot      PAST MEDICAL & SURGICAL HISTORY:  Kidney disease (N28.9)  HTN (hypertension) (I10)  DM (diabetes mellitus) (E11.9)  S/P BKA (below knee amputation) unilateral, right (Z89.511)  No significant past surgical history ([id number])    acetaminophen   Tablet 650 milliGRAM(s) Oral every 6 hours PRN  acetaminophen   Tablet. 650 milliGRAM(s) Oral every 6 hours PRN  atorvastatin 40 milliGRAM(s) Oral at bedtime  benzonatate 100 milliGRAM(s) Oral every 6 hours PRN  cyanocobalamin 1000 MICROGram(s) Oral daily  dextrose 5%. 1000 milliLiter(s) IV Continuous <Continuous>  dextrose 50% Injectable 12.5 Gram(s) IV Push once  dextrose 50% Injectable 25 Gram(s) IV Push once  dextrose 50% Injectable 25 Gram(s) IV Push once  dextrose Gel 1 Dose(s) Oral once PRN  glucagon  Injectable 1 milliGRAM(s) IntraMuscular once PRN  guaiFENesin   Syrup  (Sugar-Free) 100 milliGRAM(s) Oral every 6 hours PRN  influenza   Vaccine 0.5 milliLiter(s) IntraMuscular once  insulin lispro (HumaLOG) corrective regimen sliding scale   SubCutaneous three times a day before meals  labetalol 300 milliGRAM(s) Oral three times a day  lactobacillus acidophilus 1 Tablet(s) Oral daily  NIFEdipine XL 30 milliGRAM(s) Oral daily  silver sulfADIAZINE 1% Cream 1 Application(s) Topical daily  sodium bicarbonate 1300 milliGRAM(s) Oral three times a day    Objective:  T(C): 36.7 (02-23-18 @ 12:47), Max: 36.8 (02-22-18 @ 21:18)  HR: 66 (02-23-18 @ 12:47) (64 - 79)  BP: 158/64 (02-23-18 @ 12:47) (129/61 - 158/64)  RR: 17 (02-23-18 @ 12:47) (16 - 19)  SpO2: 100% (02-23-18 @ 12:47) (97% - 100%)    Physical exam:  GENERAL: NAD, well-developed.   HEAD:  Atraumatic, Normocephalic  EYES: EOMI, PERRLA, conjunctiva and sclera clear  NECK: Supple, No JVD  CHEST/LUNG: Clear to auscultation bilaterally with decreased breath sounds at the bases.  HEART: Regular rate and rhythm; No murmurs, rubs, or gallops  ABDOMEN: Soft, Nontender, Nondistended; Bowel sounds present  EXTREMITIES:  LLE: wound is healing, although some new blisters are present- very flat, not too fluid filled. Some drainage on gauze. Not malodorous. 2+ LE edema  PSYCH: AAOx3  NEUROLOGY: non-focal  SKIN: See extremities exam      02-22-18 @ 07:01  -  02-23-18 @ 07:00  --------------------------------------------------------  IN: 450 mL / OUT: 250 mL / NET: 200 mL        CAPILLARY BLOOD GLUCOSE      (02-23 @ 07:30)                      8.0  10.89 )-----------( 317                 24.6    Neutrophils = -- (--%)  Lymphocytes = -- (--%)  Eosinophils = -- (--%)  Basophils = -- (--%)  Monocytes = -- (--%)  Bands = --%    02-23    142  |  102  |  76<H>  ----------------------------<  103<H>  4.3   |  17<L>  |  6.30<H>    Ca    8.3<L>      23 Feb 2018 07:30    TPro  7.0  /  Alb  2.9<L>  /  TBili  0.2  /  DBili  x   /  AST  40  /  ALT  55<H>  /  AlkPhos  101  02-22      WBC Trend: 10.89<--, 10.83<--, 9.39<--    Hb Trend: 8.0<--, 7.9<--, 8.1<--, 7.8<--, 8.7<--        New imaging in last 24 hours: none  Consult notes reviewed: nephrology, podiatry, infectious disease, vascular

## 2018-02-23 NOTE — PROGRESS NOTE ADULT - SUBJECTIVE AND OBJECTIVE BOX
[facility] DIVISION OF KIDNEY DISEASES AND HYPERTENSION -- FOLLOW UP NOTE  --------------------------------------------------------------------------------  HPI: 63-year-old male with PMH of HTN, DM, right BKA, and CKD admitted for left foot infection. As per review of labs on [place]/[employer], Scr was 1.51 in 3/2017. As her pt. PMD's office, Scr checked in 7/2017 was 2.30. Labs on admission (2/12) showed elevated Scr of 3.8 which is elevated however stable at 6.3 today. Pt. currently resting in bed and denies SOB, CP, or N/V.       PAST HISTORY  --------------------------------------------------------------------------------  No significant changes to PMH, PSH, FHx, SHx, unless otherwise noted    ALLERGIES & MEDICATIONS  --------------------------------------------------------------------------------  Allergies    No Known Allergies    Intolerances      Standing Inpatient Medications  atorvastatin 40 milliGRAM(s) Oral at bedtime  cyanocobalamin 1000 MICROGram(s) Oral daily  dextrose 5%. 1000 milliLiter(s) IV Continuous <Continuous>  dextrose 50% Injectable 12.5 Gram(s) IV Push once  dextrose 50% Injectable 25 Gram(s) IV Push once  dextrose 50% Injectable 25 Gram(s) IV Push once  influenza   Vaccine 0.5 milliLiter(s) IntraMuscular once  insulin lispro (HumaLOG) corrective regimen sliding scale   SubCutaneous three times a day before meals  labetalol 300 milliGRAM(s) Oral three times a day  lactobacillus acidophilus 1 Tablet(s) Oral daily  NIFEdipine XL 30 milliGRAM(s) Oral daily  silver sulfADIAZINE 1% Cream 1 Application(s) Topical daily  sodium bicarbonate 1300 milliGRAM(s) Oral three times a day      REVIEW OF SYSTEMS  --------------------------------------------------------------------------------  General: no fever  CVS: no chest pain  RESP: no SOB  ABD: no abdominal pain  : no dysuria  MSK: Left leg edema/foot infection      VITALS/PHYSICAL EXAM  --------------------------------------------------------------------------------  T(C): 36.6 (02-23-18 @ 05:07), Max: 36.8 (02-22-18 @ 21:18)  HR: 79 (02-23-18 @ 05:07) (64 - 79)  BP: 144/64 (02-23-18 @ 05:07) (129/61 - 152/62)  RR: 16 (02-23-18 @ 05:07) (16 - 19)  SpO2: 100% (02-23-18 @ 05:07) (97% - 100%)  Wt(kg): --        02-22-18 @ 07:01  -  02-23-18 @ 07:00  --------------------------------------------------------  IN: 450 mL / OUT: 250 mL / NET: 200 mL      Physical Exam:  	Gen: Resting in bed   	HEENT: No JVD  	Pulm: CTA B/L  	CV: S1S2+  	Abd:  soft  	LE: Right BKA, LLE edema present, left foot dressing seen   	Neuro: Awake and alert   	Psych: Normal affect and mood  	Skin: Warm    LABS/STUDIES  --------------------------------------------------------------------------------              8.0    10.89 >-----------<  317      [02-23-18 @ 07:30]              24.6     142  |  102  |  76  ----------------------------<  103      [02-23-18 @ 07:30]  4.3   |  17  |  6.30        Ca     8.3     [02-23-18 @ 07:30]    TPro  7.0  /  Alb  2.9  /  TBili  0.2  /  DBili  x   /  AST  40  /  ALT  55  /  AlkPhos  101  [02-22-18 @ 06:40]          Creatinine Trend:  SCr 6.30 [02-23 @ 07:30]  SCr 6.13 [02-22 @ 06:40]  SCr 5.81 [02-21 @ 06:15]  SCr 5.30 [02-20 @ 05:00]  SCr 5.08 [02-19 @ 05:00] [facility] DIVISION OF KIDNEY DISEASES AND HYPERTENSION -- FOLLOW UP NOTE  --------------------------------------------------------------------------------  HPI: 63-year-old male with PMH of HTN, DM, right BKA, and CKD admitted for left foot infection. As per review of labs on [place]/[employer], Scr was 1.51 in 3/2017. As her pt. PMD's office, Scr checked in 7/2017 was 2.30. Labs on admission (2/12) showed elevated Scr of 3.8 which is elevated however stable at 6.3 today. Pt. currently resting in bed and denies SOB, CP, or N/V.     PAST HISTORY  --------------------------------------------------------------------------------  No significant changes to PMH, PSH, FHx, SHx, unless otherwise noted    ALLERGIES & MEDICATIONS  --------------------------------------------------------------------------------  Allergies    No Known Allergies    Standing Inpatient Medications  atorvastatin 40 milliGRAM(s) Oral at bedtime  cyanocobalamin 1000 MICROGram(s) Oral daily  dextrose 5%. 1000 milliLiter(s) IV Continuous <Continuous>  dextrose 50% Injectable 12.5 Gram(s) IV Push once  dextrose 50% Injectable 25 Gram(s) IV Push once  dextrose 50% Injectable 25 Gram(s) IV Push once  influenza   Vaccine 0.5 milliLiter(s) IntraMuscular once  insulin lispro (HumaLOG) corrective regimen sliding scale   SubCutaneous three times a day before meals  labetalol 300 milliGRAM(s) Oral three times a day  lactobacillus acidophilus 1 Tablet(s) Oral daily  NIFEdipine XL 30 milliGRAM(s) Oral daily  silver sulfADIAZINE 1% Cream 1 Application(s) Topical daily  sodium bicarbonate 1300 milliGRAM(s) Oral three times a day    REVIEW OF SYSTEMS  --------------------------------------------------------------------------------  General: no fever  CVS: no chest pain  RESP: no SOB  ABD: no abdominal pain  : no dysuria  MSK: Left leg edema/foot infection    VITALS/PHYSICAL EXAM  --------------------------------------------------------------------------------  T(C): 36.6 (02-23-18 @ 05:07), Max: 36.8 (02-22-18 @ 21:18)  HR: 79 (02-23-18 @ 05:07) (64 - 79)  BP: 144/64 (02-23-18 @ 05:07) (129/61 - 152/62)  RR: 16 (02-23-18 @ 05:07) (16 - 19)  SpO2: 100% (02-23-18 @ 05:07) (97% - 100%)  Wt(kg): --    02-22-18 @ 07:01  -  02-23-18 @ 07:00  --------------------------------------------------------  IN: 450 mL / OUT: 250 mL / NET: 200 mL    Physical Exam:  	Gen: Resting in bed   	HEENT: No JVD  	Pulm: CTA B/L  	CV: S1S2+  	Abd:  soft  	LE: Right BKA, LLE edema present, left foot dressing seen   	Neuro: Awake and alert   	Psych: Normal affect and mood  	Skin: Warm    LABS/STUDIES  --------------------------------------------------------------------------------              8.0    10.89 >-----------<  317      [02-23-18 @ 07:30]              24.6     142  |  102  |  76  ----------------------------<  103      [02-23-18 @ 07:30]  4.3   |  17  |  6.30        Ca     8.3     [02-23-18 @ 07:30]    TPro  7.0  /  Alb  2.9  /  TBili  0.2  /  DBili  x   /  AST  40  /  ALT  55  /  AlkPhos  101  [02-22-18 @ 06:40]    Creatinine Trend:  SCr 6.30 [02-23 @ 07:30]  SCr 6.13 [02-22 @ 06:40]  SCr 5.81 [02-21 @ 06:15]  SCr 5.30 [02-20 @ 05:00]  SCr 5.08 [02-19 @ 05:00]

## 2018-02-23 NOTE — PROGRESS NOTE ADULT - PROBLEM SELECTOR PLAN 2
-JANY on evolving ATN on CKD stage III. Creatinine uptrending; today 6.3 from 6.13.  -Patient still urinating. Will trend outputs.  -Renal consult appreciated. Renal u/s negative. Spun down urine demonstrating granular casts suggesting ATN. Baseline creatinine is 2.3.   -Avoiding nephrotoxins and RCA.  -Sodium bicarb TID  -Patient has had discussions about likely need for HD. Pt understanding.

## 2018-02-24 LAB
APPEARANCE UR: CLEAR — SIGNIFICANT CHANGE UP
BILIRUB UR-MCNC: NEGATIVE — SIGNIFICANT CHANGE UP
BLOOD UR QL VISUAL: HIGH
BUN SERPL-MCNC: 81 MG/DL — HIGH (ref 7–23)
CALCIUM SERPL-MCNC: 8 MG/DL — LOW (ref 8.4–10.5)
CHLORIDE SERPL-SCNC: 104 MMOL/L — SIGNIFICANT CHANGE UP (ref 98–107)
CO2 SERPL-SCNC: 18 MMOL/L — LOW (ref 22–31)
COLOR SPEC: SIGNIFICANT CHANGE UP
CREAT SERPL-MCNC: 6.21 MG/DL — HIGH (ref 0.5–1.3)
GLUCOSE BLDC GLUCOMTR-MCNC: 100 MG/DL — HIGH (ref 70–99)
GLUCOSE BLDC GLUCOMTR-MCNC: 102 MG/DL — HIGH (ref 70–99)
GLUCOSE BLDC GLUCOMTR-MCNC: 108 MG/DL — HIGH (ref 70–99)
GLUCOSE BLDC GLUCOMTR-MCNC: 93 MG/DL — SIGNIFICANT CHANGE UP (ref 70–99)
GLUCOSE SERPL-MCNC: 107 MG/DL — HIGH (ref 70–99)
GLUCOSE UR-MCNC: NEGATIVE — SIGNIFICANT CHANGE UP
GRAN CASTS # UR COMP ASSIST: SIGNIFICANT CHANGE UP
HCT VFR BLD CALC: 23.7 % — LOW (ref 39–50)
HGB BLD-MCNC: 7.7 G/DL — LOW (ref 13–17)
HYALINE CASTS # UR AUTO: SIGNIFICANT CHANGE UP (ref 0–?)
KETONES UR-MCNC: NEGATIVE — SIGNIFICANT CHANGE UP
LEUKOCYTE ESTERASE UR-ACNC: NEGATIVE — SIGNIFICANT CHANGE UP
MCHC RBC-ENTMCNC: 28 PG — SIGNIFICANT CHANGE UP (ref 27–34)
MCHC RBC-ENTMCNC: 32.5 % — SIGNIFICANT CHANGE UP (ref 32–36)
MCV RBC AUTO: 86.2 FL — SIGNIFICANT CHANGE UP (ref 80–100)
MUCOUS THREADS # UR AUTO: SIGNIFICANT CHANGE UP
NITRITE UR-MCNC: NEGATIVE — SIGNIFICANT CHANGE UP
NRBC # FLD: 0 — SIGNIFICANT CHANGE UP
PH UR: 5 — SIGNIFICANT CHANGE UP (ref 4.6–8)
PLATELET # BLD AUTO: 303 K/UL — SIGNIFICANT CHANGE UP (ref 150–400)
PMV BLD: 11.1 FL — SIGNIFICANT CHANGE UP (ref 7–13)
POTASSIUM SERPL-MCNC: 4.7 MMOL/L — SIGNIFICANT CHANGE UP (ref 3.5–5.3)
POTASSIUM SERPL-SCNC: 4.7 MMOL/L — SIGNIFICANT CHANGE UP (ref 3.5–5.3)
PROT UR-MCNC: 100 MG/DL — SIGNIFICANT CHANGE UP
RBC # BLD: 2.75 M/UL — LOW (ref 4.2–5.8)
RBC # FLD: 14.5 % — SIGNIFICANT CHANGE UP (ref 10.3–14.5)
RBC CASTS # UR COMP ASSIST: SIGNIFICANT CHANGE UP (ref 0–?)
SODIUM SERPL-SCNC: 141 MMOL/L — SIGNIFICANT CHANGE UP (ref 135–145)
SP GR SPEC: 1.02 — SIGNIFICANT CHANGE UP (ref 1–1.04)
SQUAMOUS # UR AUTO: SIGNIFICANT CHANGE UP
UROBILINOGEN FLD QL: NORMAL MG/DL — SIGNIFICANT CHANGE UP
WBC # BLD: 11.15 K/UL — HIGH (ref 3.8–10.5)
WBC # FLD AUTO: 11.15 K/UL — HIGH (ref 3.8–10.5)
WBC UR QL: SIGNIFICANT CHANGE UP (ref 0–?)

## 2018-02-24 PROCEDURE — 99233 SBSQ HOSP IP/OBS HIGH 50: CPT | Mod: GC

## 2018-02-24 PROCEDURE — 99233 SBSQ HOSP IP/OBS HIGH 50: CPT

## 2018-02-24 RX ADMIN — Medication 1 APPLICATION(S): at 13:20

## 2018-02-24 RX ADMIN — Medication 1 TABLET(S): at 13:19

## 2018-02-24 RX ADMIN — Medication 300 MILLIGRAM(S): at 13:14

## 2018-02-24 RX ADMIN — ATORVASTATIN CALCIUM 40 MILLIGRAM(S): 80 TABLET, FILM COATED ORAL at 22:30

## 2018-02-24 RX ADMIN — Medication 30 MILLIGRAM(S): at 05:59

## 2018-02-24 RX ADMIN — PREGABALIN 1000 MICROGRAM(S): 225 CAPSULE ORAL at 13:19

## 2018-02-24 RX ADMIN — Medication 1300 MILLIGRAM(S): at 13:20

## 2018-02-24 RX ADMIN — Medication 1300 MILLIGRAM(S): at 05:59

## 2018-02-24 RX ADMIN — Medication 300 MILLIGRAM(S): at 05:59

## 2018-02-24 RX ADMIN — Medication 1300 MILLIGRAM(S): at 22:30

## 2018-02-24 RX ADMIN — Medication 300 MILLIGRAM(S): at 22:30

## 2018-02-24 NOTE — PROGRESS NOTE ADULT - ATTENDING COMMENTS
Pt remains stable. Cr stabilizing. No acute shortness of breath or emergent need for HD at this time. Renal recs appreciated. Monitor rising WBC. Continue wound care

## 2018-02-24 NOTE — PROGRESS NOTE ADULT - SUBJECTIVE AND OBJECTIVE BOX
Progress Note    JAMES PATRICK 63y (1955) Male [id number]  02-12-18 (12d)    Dr. Galvze [facility] PGY1  Pager# [phone]    Chief Complaint: 63M PMH DM, HTN, CKD p/w L foot pain x 3 days.    Subjective:  No acute events overnight. Patient seen and examined at bedside. Pt had 3 episodes of diarrhea yesterday. Another one this morning. Reports cough still persists, but no SOB. No wheezing. denies pain in foot, but still difficulty walking. Walks with assistance. Reports urinating, but less.    Review of Systems:  CONSTITUTIONAL: No fever, weight loss, or fatigue  EYES: No eye pain. Poor vision at baseline.  ENMT:  No difficulty hearing, tinnitus, vertigo; No sinus or throat pain  NECK: No pain or stiffness  RESPIRATORY: +Cough. No SOB, wheezing.   CARDIOVASCULAR: No chest pain, palpitations, dizziness  GASTROINTESTINAL: No abdominal or epigastric pain. No nausea, vomiting, or hematemesis; + diarrhea without blood  GENITOURINARY: +decreased urine output.   NEUROLOGICAL: No headaches, memory loss, loss of strength, numbness, or tremors  SKIN: No itching, burning, rashes, or lesions   MUSCULOSKELETAL: No joint pain or swelling; No muscle, back, or extremity pain      PAST MEDICAL & SURGICAL HISTORY:  Kidney disease (N28.9)  HTN (hypertension) (I10)  DM (diabetes mellitus) (E11.9)  S/P BKA (below knee amputation) unilateral, right (Z89.511)  No significant past surgical history ([id number])    acetaminophen   Tablet 650 milliGRAM(s) Oral every 6 hours PRN  acetaminophen   Tablet. 650 milliGRAM(s) Oral every 6 hours PRN  atorvastatin 40 milliGRAM(s) Oral at bedtime  benzonatate 100 milliGRAM(s) Oral every 6 hours PRN  cyanocobalamin 1000 MICROGram(s) Oral daily  dextrose 5%. 1000 milliLiter(s) IV Continuous <Continuous>  dextrose 50% Injectable 12.5 Gram(s) IV Push once  dextrose 50% Injectable 25 Gram(s) IV Push once  dextrose 50% Injectable 25 Gram(s) IV Push once  dextrose Gel 1 Dose(s) Oral once PRN  glucagon  Injectable 1 milliGRAM(s) IntraMuscular once PRN  guaiFENesin   Syrup  (Sugar-Free) 100 milliGRAM(s) Oral every 6 hours PRN  influenza   Vaccine 0.5 milliLiter(s) IntraMuscular once  insulin lispro (HumaLOG) corrective regimen sliding scale   SubCutaneous three times a day before meals  labetalol 300 milliGRAM(s) Oral three times a day  lactobacillus acidophilus 1 Tablet(s) Oral daily  NIFEdipine XL 30 milliGRAM(s) Oral daily  silver sulfADIAZINE 1% Cream 1 Application(s) Topical daily  sodium bicarbonate 1300 milliGRAM(s) Oral three times a day    Objective:  T(C): 37.1 (02-24-18 @ 04:45), Max: 37.1 (02-24-18 @ 04:45)  HR: 64 (02-24-18 @ 04:45) (64 - 66)  BP: 140/62 (02-24-18 @ 04:45) (140/62 - 158/64)  RR: 18 (02-24-18 @ 04:45) (17 - 18)  SpO2: 94% (02-24-18 @ 04:45) (94% - 100%)    Physical exam:  GENERAL: NAD, well-developed  HEAD:  Atraumatic, Normocephalic  EYES: EOMI, PERRLA, conjunctiva and sclera clear  NECK: Supple, No JVD  CHEST/LUNG: Clear to auscultation bilaterally; No wheeze  HEART: Regular rate and rhythm; No murmurs, rubs, or gallops  ABDOMEN: Soft, Nontender, Nondistended; Bowel sounds present  EXTREMITIES:  2+ Peripheral Pulses, No clubbing, cyanosis, or edema  PSYCH: AAOx3  NEUROLOGY: non-focal  SKIN: No rashes or lesions      02-23-18 @ 07:01  -  02-24-18 @ 07:00  --------------------------------------------------------  IN: 240 mL / OUT: 425 mL / NET: -185 mL        CAPILLARY BLOOD GLUCOSE      (02-24 @ 06:00)                      7.7  11.15 )-----------( 303                 23.7    Neutrophils = -- (--%)  Lymphocytes = -- (--%)  Eosinophils = -- (--%)  Basophils = -- (--%)  Monocytes = -- (--%)  Bands = --%    02-24    141  |  104  |  81<H>  ----------------------------<  107<H>  4.7   |  18<L>  |  6.21<H>    Ca    8.0<L>      24 Feb 2018 06:00    WBC Trend: 11.15<--, 10.89<--, 10.83<--    Hb Trend: 7.7<--, 8.0<--, 7.9<--, 8.1<--, 7.8<--        New imaging in last 24 hours: none  Consult notes reviewed: vascular, infectious disease, nephrology, podiatry Progress Note    JAMES PATRICK 63y (1955) Male [id number]  02-12-18 (12d)    Dr. Galvez [facility] PGY1  Pager# [phone]    Chief Complaint: 63M PMH DM, HTN, CKD p/w L foot pain x 3 days.    Subjective:  No acute events overnight. Patient seen and examined at bedside. Pt had 3 episodes of diarrhea yesterday. Another one this morning. Reports cough still persists, but no SOB. No wheezing. denies pain in foot, but still difficulty walking. Walks with assistance. Reports urinating, but less.    Review of Systems:  CONSTITUTIONAL: No fever, weight loss, or fatigue  EYES: No eye pain. Poor vision at baseline.  ENMT:  No difficulty hearing, tinnitus, vertigo; No sinus or throat pain  NECK: No pain or stiffness  RESPIRATORY: +Cough. No SOB, wheezing.   CARDIOVASCULAR: No chest pain, palpitations, dizziness  GASTROINTESTINAL: No abdominal or epigastric pain. No nausea, vomiting, or hematemesis; + diarrhea without blood  GENITOURINARY: +decreased urine output.   NEUROLOGICAL: No headaches, memory loss, loss of strength, numbness, or tremors  SKIN: No itching, burning, rashes, or lesions   MUSCULOSKELETAL: No joint pain or swelling; No muscle, back, or extremity pain      PAST MEDICAL & SURGICAL HISTORY:  Kidney disease (N28.9)  HTN (hypertension) (I10)  DM (diabetes mellitus) (E11.9)  S/P BKA (below knee amputation) unilateral, right (Z89.511)  No significant past surgical history ([id number])    acetaminophen   Tablet 650 milliGRAM(s) Oral every 6 hours PRN  acetaminophen   Tablet. 650 milliGRAM(s) Oral every 6 hours PRN  atorvastatin 40 milliGRAM(s) Oral at bedtime  benzonatate 100 milliGRAM(s) Oral every 6 hours PRN  cyanocobalamin 1000 MICROGram(s) Oral daily  dextrose 5%. 1000 milliLiter(s) IV Continuous <Continuous>  dextrose 50% Injectable 12.5 Gram(s) IV Push once  dextrose 50% Injectable 25 Gram(s) IV Push once  dextrose 50% Injectable 25 Gram(s) IV Push once  dextrose Gel 1 Dose(s) Oral once PRN  glucagon  Injectable 1 milliGRAM(s) IntraMuscular once PRN  guaiFENesin   Syrup  (Sugar-Free) 100 milliGRAM(s) Oral every 6 hours PRN  influenza   Vaccine 0.5 milliLiter(s) IntraMuscular once  insulin lispro (HumaLOG) corrective regimen sliding scale   SubCutaneous three times a day before meals  labetalol 300 milliGRAM(s) Oral three times a day  lactobacillus acidophilus 1 Tablet(s) Oral daily  NIFEdipine XL 30 milliGRAM(s) Oral daily  silver sulfADIAZINE 1% Cream 1 Application(s) Topical daily  sodium bicarbonate 1300 milliGRAM(s) Oral three times a day    Objective:  T(C): 37.1 (02-24-18 @ 04:45), Max: 37.1 (02-24-18 @ 04:45)  HR: 64 (02-24-18 @ 04:45) (64 - 66)  BP: 140/62 (02-24-18 @ 04:45) (140/62 - 158/64)  RR: 18 (02-24-18 @ 04:45) (17 - 18)  SpO2: 94% (02-24-18 @ 04:45) (94% - 100%)    Physical exam:  GENERAL: NAD, well-developed  HEAD:  Atraumatic, Normocephalic  EYES: EOMI, PERRLA, conjunctiva and sclera clear  NECK: Supple, No JVD  CHEST/LUNG: Clear to auscultation bilaterally; No wheeze  HEART: Regular rate and rhythm; No murmurs, rubs, or gallops  ABDOMEN: Soft, Nontender, Nondistended; Bowel sounds present  EXTREMITIES: LLE: swelling unchanged. Nontender. Sole of foot appears to be healing. Blisters still present. No bleeding.  PSYCH: AAOx3  NEUROLOGY: non-focal  SKIN: No rashes or lesions      02-23-18 @ 07:01  -  02-24-18 @ 07:00  --------------------------------------------------------  IN: 240 mL / OUT: 425 mL / NET: -185 mL        CAPILLARY BLOOD GLUCOSE      (02-24 @ 06:00)                      7.7  11.15 )-----------( 303                 23.7    Neutrophils = -- (--%)  Lymphocytes = -- (--%)  Eosinophils = -- (--%)  Basophils = -- (--%)  Monocytes = -- (--%)  Bands = --%    02-24    141  |  104  |  81<H>  ----------------------------<  107<H>  4.7   |  18<L>  |  6.21<H>    Ca    8.0<L>      24 Feb 2018 06:00    WBC Trend: 11.15<--, 10.89<--, 10.83<--    Hb Trend: 7.7<--, 8.0<--, 7.9<--, 8.1<--, 7.8<--        New imaging in last 24 hours: none  Consult notes reviewed: vascular, infectious disease, nephrology, podiatry

## 2018-02-24 NOTE — PROGRESS NOTE ADULT - PROBLEM SELECTOR PLAN 3
-Hgb 7.6 this morning.   -Retic count normal. Ferritin high. Likely 2/2 in setting of decreased EPO production d/t worsening kidney function. Hemolysis less likely. T bili is WNL. Acute blood loss also less likely as no source.

## 2018-02-24 NOTE — PROGRESS NOTE ADULT - PROBLEM SELECTOR PLAN 2
Acidosis in the setting of JANY and infection. Serum CO2 low Pt. on oral sodium bicarbonate 1300 mg TID. Monitor serum CO2

## 2018-02-24 NOTE — PROGRESS NOTE ADULT - PROBLEM SELECTOR PLAN 2
-Evolving ATN on CKD stage III. Creatinine 6.2 today. Slightly down from 6.3 yesterday  -Patient still urinating, but very little. Will obtain UA for nephrology team. Patient has B lines throughout likely 2/2 fluid overload. Will need to monitor urine outputs better.   -Renal consult appreciated. Renal u/s negative. Spun down urine demonstrating granular casts suggesting ATN. Baseline creatinine is 2.3.   -Avoiding nephrotoxins and RCA.  -Sodium bicarb TID  -Patient has had discussions about likely need for HD. Pt understanding. -Evolving ATN on CKD stage III. Creatinine 6.2 today. Slightly down from 6.3 yesterday  -Patient still urinating, but very little. Will obtain UA for nephrology team. Patient has B lines throughout likely 2/2 fluid overload. Will need to monitor urine outputs better. Spoke to nephrology today. If still urinating tomorrow, may start on Lasix for the fluid overload.  -Renal consult appreciated. Renal u/s negative. Spun down urine demonstrating granular casts suggesting ATN. Baseline creatinine is 2.3.   -Avoiding nephrotoxins and RCA.  -Sodium bicarb TID  -Patient has had discussions about likely need for HD. Pt understanding.

## 2018-02-24 NOTE — PROGRESS NOTE ADULT - SUBJECTIVE AND OBJECTIVE BOX
[facility] DIVISION OF KIDNEY DISEASES AND HYPERTENSION -- FOLLOW UP NOTE  --------------------------------------------------------------------------------    HPI: 63-year-old male with PMH of HTN, DM, right BKA, and CKD admitted for left foot infection. As per review of labs on [place]/[employer], Scr was 1.51 in 3/2017. As her pt. PMD's office, Scr checked in 7/2017 was 2.30. Labs on admission (2/12) showed elevated Scr of 3.8 which is elevated however stable at 6.21 today. Pt. seen and evaluated at bedside lying flat breathing comfortably without oxygen.  No complaints of SOB, reports that he has been urinating some but not a lot.        PAST HISTORY  --------------------------------------------------------------------------------  No significant changes to PMH, PSH, FHx, SHx, unless otherwise noted    ALLERGIES & MEDICATIONS  --------------------------------------------------------------------------------  Allergies    No Known Allergies    Intolerances      Standing Inpatient Medications  atorvastatin 40 milliGRAM(s) Oral at bedtime  cyanocobalamin 1000 MICROGram(s) Oral daily  dextrose 5%. 1000 milliLiter(s) IV Continuous <Continuous>  dextrose 50% Injectable 12.5 Gram(s) IV Push once  dextrose 50% Injectable 25 Gram(s) IV Push once  dextrose 50% Injectable 25 Gram(s) IV Push once  influenza   Vaccine 0.5 milliLiter(s) IntraMuscular once  insulin lispro (HumaLOG) corrective regimen sliding scale   SubCutaneous three times a day before meals  labetalol 300 milliGRAM(s) Oral three times a day  lactobacillus acidophilus 1 Tablet(s) Oral daily  NIFEdipine XL 30 milliGRAM(s) Oral daily  silver sulfADIAZINE 1% Cream 1 Application(s) Topical daily  sodium bicarbonate 1300 milliGRAM(s) Oral three times a day    PRN Inpatient Medications  acetaminophen   Tablet 650 milliGRAM(s) Oral every 6 hours PRN  acetaminophen   Tablet. 650 milliGRAM(s) Oral every 6 hours PRN  benzonatate 100 milliGRAM(s) Oral every 6 hours PRN  dextrose Gel 1 Dose(s) Oral once PRN  glucagon  Injectable 1 milliGRAM(s) IntraMuscular once PRN  guaiFENesin   Syrup  (Sugar-Free) 100 milliGRAM(s) Oral every 6 hours PRN      REVIEW OF SYSTEMS  --------------------------------------------------------------------------------    Gen: no fever  CV: no cp  Pulm: no dyspnea  GI: no abd pain   : urinating.    VITALS/PHYSICAL EXAM  --------------------------------------------------------------------------------  T(C): 37.1 (02-24-18 @ 14:28), Max: 37.1 (02-24-18 @ 04:45)  HR: 66 (02-24-18 @ 13:20) (64 - 66)  BP: 144/66 (02-24-18 @ 13:20) (140/62 - 148/85)  RR: 18 (02-24-18 @ 13:20) (17 - 18)  SpO2: 97% (02-24-18 @ 13:20) (94% - 97%)  Wt(kg): --        02-23-18 @ 07:01  -  02-24-18 @ 07:00  --------------------------------------------------------  IN: 240 mL / OUT: 425 mL / NET: -185 mL      Physical Exam:  	Gen: Resting in bed   	HEENT: +JVD  	Pulm: rales at bases  	CV: S1S2+  	Abd:  soft  	LE: Right BKA, LLE edema present, left foot dressing seen   	Neuro: Awake and alert   	Psych: Normal affect and mood  	Skin: Warm        LABS/STUDIES  --------------------------------------------------------------------------------              7.7    11.15 >-----------<  303      [02-24-18 @ 06:00]              23.7     141  |  104  |  81  ----------------------------<  107      [02-24-18 @ 06:00]  4.7   |  18  |  6.21        Ca     8.0     [02-24-18 @ 06:00]            Creatinine Trend:  SCr 6.21 [02-24 @ 06:00]  SCr 6.30 [02-23 @ 07:30]  SCr 6.13 [02-22 @ 06:40]  SCr 5.81 [02-21 @ 06:15]  SCr 5.30 [02-20 @ 05:00]    Urinalysis - [02-24-18 @ 13:20]      Color PLYEL / Appearance CLEAR / SG 1.016 / pH 5.0      Gluc NEGATIVE / Ketone NEGATIVE  / Bili NEGATIVE / Urobili NORMAL       Blood TRACE / Protein 100 / Leuk Est NEGATIVE / Nitrite NEGATIVE      RBC 0-2 / WBC 2-5 / Hyaline 2-5 / Gran 2-5 / Sq Epi OCC / Non Sq Epi  / Bacteria       Iron 21, TIBC 163, %sat --      [02-20-18 @ 05:00]  Ferritin 540.7      [02-20-18 @ 05:00]  HbA1c 5.9      [02-13-18 @ 07:11]

## 2018-02-24 NOTE — PROGRESS NOTE ADULT - PROBLEM SELECTOR PLAN 1
Pt. with JANY on CKD in the setting of infection and diarrhea. CKD in the setting of long-standing DM. Scr was 1.5 in 3/2017, increased to 2.30 in 7/2017. Scr on admission (2/12) was elevated at 3.81, which is elevated however stable at 6.21 today. Pt. with likely plateau phase ATN. At this point despite physical examination the patient is very comfortable and not short of breath.  Monitor BMP and urine output. Avoid any potential nephrotoxins.  Can give lasix 80 IV for acute dyspnea.

## 2018-02-25 LAB
APPEARANCE UR: CLEAR — SIGNIFICANT CHANGE UP
BILIRUB UR-MCNC: NEGATIVE — SIGNIFICANT CHANGE UP
BLOOD UR QL VISUAL: HIGH
BUN SERPL-MCNC: 82 MG/DL — HIGH (ref 7–23)
CALCIUM SERPL-MCNC: 7.9 MG/DL — LOW (ref 8.4–10.5)
CHLORIDE SERPL-SCNC: 104 MMOL/L — SIGNIFICANT CHANGE UP (ref 98–107)
CO2 SERPL-SCNC: 19 MMOL/L — LOW (ref 22–31)
COLOR SPEC: SIGNIFICANT CHANGE UP
CREAT ?TM UR-MCNC: 112.75 MG/DL — SIGNIFICANT CHANGE UP
CREAT SERPL-MCNC: 5.77 MG/DL — HIGH (ref 0.5–1.3)
GLUCOSE BLDC GLUCOMTR-MCNC: 114 MG/DL — HIGH (ref 70–99)
GLUCOSE BLDC GLUCOMTR-MCNC: 172 MG/DL — HIGH (ref 70–99)
GLUCOSE BLDC GLUCOMTR-MCNC: 80 MG/DL — SIGNIFICANT CHANGE UP (ref 70–99)
GLUCOSE SERPL-MCNC: 69 MG/DL — LOW (ref 70–99)
GLUCOSE UR-MCNC: NEGATIVE — SIGNIFICANT CHANGE UP
HCT VFR BLD CALC: 23.8 % — LOW (ref 39–50)
HGB BLD-MCNC: 7.6 G/DL — LOW (ref 13–17)
KETONES UR-MCNC: NEGATIVE — SIGNIFICANT CHANGE UP
LEUKOCYTE ESTERASE UR-ACNC: SIGNIFICANT CHANGE UP
MCHC RBC-ENTMCNC: 27.1 PG — SIGNIFICANT CHANGE UP (ref 27–34)
MCHC RBC-ENTMCNC: 31.9 % — LOW (ref 32–36)
MCV RBC AUTO: 85 FL — SIGNIFICANT CHANGE UP (ref 80–100)
NITRITE UR-MCNC: NEGATIVE — SIGNIFICANT CHANGE UP
NRBC # FLD: 0 — SIGNIFICANT CHANGE UP
PH UR: 5.5 — SIGNIFICANT CHANGE UP (ref 4.6–8)
PLATELET # BLD AUTO: 344 K/UL — SIGNIFICANT CHANGE UP (ref 150–400)
PMV BLD: 11.4 FL — SIGNIFICANT CHANGE UP (ref 7–13)
POTASSIUM SERPL-MCNC: 4.5 MMOL/L — SIGNIFICANT CHANGE UP (ref 3.5–5.3)
POTASSIUM SERPL-SCNC: 4.5 MMOL/L — SIGNIFICANT CHANGE UP (ref 3.5–5.3)
PROT UR-MCNC: 100 MG/DL — SIGNIFICANT CHANGE UP
PROT UR-MCNC: 55.9 MG/DL — SIGNIFICANT CHANGE UP
RBC # BLD: 2.8 M/UL — LOW (ref 4.2–5.8)
RBC # FLD: 14.6 % — HIGH (ref 10.3–14.5)
RBC CASTS # UR COMP ASSIST: SIGNIFICANT CHANGE UP (ref 0–?)
SODIUM SERPL-SCNC: 142 MMOL/L — SIGNIFICANT CHANGE UP (ref 135–145)
SP GR SPEC: 1.01 — SIGNIFICANT CHANGE UP (ref 1–1.04)
SQUAMOUS # UR AUTO: SIGNIFICANT CHANGE UP
UROBILINOGEN FLD QL: NORMAL MG/DL — SIGNIFICANT CHANGE UP
WBC # BLD: 8.22 K/UL — SIGNIFICANT CHANGE UP (ref 3.8–10.5)
WBC # FLD AUTO: 8.22 K/UL — SIGNIFICANT CHANGE UP (ref 3.8–10.5)
WBC UR QL: SIGNIFICANT CHANGE UP (ref 0–?)

## 2018-02-25 PROCEDURE — 99233 SBSQ HOSP IP/OBS HIGH 50: CPT

## 2018-02-25 PROCEDURE — 86334 IMMUNOFIX E-PHORESIS SERUM: CPT | Mod: 26

## 2018-02-25 PROCEDURE — 99233 SBSQ HOSP IP/OBS HIGH 50: CPT | Mod: GC

## 2018-02-25 RX ADMIN — PREGABALIN 1000 MICROGRAM(S): 225 CAPSULE ORAL at 13:07

## 2018-02-25 RX ADMIN — Medication 1300 MILLIGRAM(S): at 05:30

## 2018-02-25 RX ADMIN — Medication 1300 MILLIGRAM(S): at 13:07

## 2018-02-25 RX ADMIN — Medication 1 APPLICATION(S): at 13:10

## 2018-02-25 RX ADMIN — Medication 300 MILLIGRAM(S): at 22:21

## 2018-02-25 RX ADMIN — Medication 1300 MILLIGRAM(S): at 22:21

## 2018-02-25 RX ADMIN — ATORVASTATIN CALCIUM 40 MILLIGRAM(S): 80 TABLET, FILM COATED ORAL at 22:21

## 2018-02-25 RX ADMIN — Medication 300 MILLIGRAM(S): at 13:07

## 2018-02-25 RX ADMIN — Medication 300 MILLIGRAM(S): at 05:30

## 2018-02-25 RX ADMIN — Medication 1 TABLET(S): at 13:07

## 2018-02-25 RX ADMIN — Medication 1: at 13:06

## 2018-02-25 RX ADMIN — Medication 30 MILLIGRAM(S): at 05:30

## 2018-02-25 NOTE — PROGRESS NOTE ADULT - PROBLEM SELECTOR PLAN 1
-L foot with cellulitis with resulting sepsis which is now improved.  -ID is following; recs appreciated. S/p vancomycin and zosyn. Completed Unasyn on 2/22.  -Podiatry has evaluated patient and report good improvement. recs appreciated.   -Surgery has been consulted and are following. Want an angiogram; scheduling for when creatinine is stable.   -Wound culture growing Acinetobacter, corynebacterium, staph haemolyticus and staph aureus. Blood cx with NGTD.  -LLE xrays negative for free air; CT read negative for free air.

## 2018-02-25 NOTE — PROGRESS NOTE ADULT - SUBJECTIVE AND OBJECTIVE BOX
[facility] DIVISION OF KIDNEY DISEASES AND HYPERTENSION -- FOLLOW UP NOTE  --------------------------------------------------------------------------------    HPI: 63-year-old male with PMH of HTN, DM, right BKA, and CKD admitted for left foot infection. As per review of labs on [place]/[employer], Scr was 1.51 in 3/2017. As her pt. PMD's office, Scr checked in 7/2017 was 2.30. Labs on admission (2/12) showed elevated Scr of 3.8 which is elevated. Pt Scr however improved to 5.77 today. Pt. seen and evaluated at bedside. Patient has no complaints and feels well.     PAST HISTORY  --------------------------------------------------------------------------------  No significant changes to PMH, PSH, FHx, SHx, unless otherwise noted    ALLERGIES & MEDICATIONS  --------------------------------------------------------------------------------  Allergies    No Known Allergies    Intolerances      Standing Inpatient Medications  atorvastatin 40 milliGRAM(s) Oral at bedtime  cyanocobalamin 1000 MICROGram(s) Oral daily  dextrose 5%. 1000 milliLiter(s) IV Continuous <Continuous>  dextrose 50% Injectable 12.5 Gram(s) IV Push once  dextrose 50% Injectable 25 Gram(s) IV Push once  dextrose 50% Injectable 25 Gram(s) IV Push once  influenza   Vaccine 0.5 milliLiter(s) IntraMuscular once  insulin lispro (HumaLOG) corrective regimen sliding scale   SubCutaneous three times a day before meals  labetalol 300 milliGRAM(s) Oral three times a day  lactobacillus acidophilus 1 Tablet(s) Oral daily  NIFEdipine XL 30 milliGRAM(s) Oral daily  silver sulfADIAZINE 1% Cream 1 Application(s) Topical daily  sodium bicarbonate 1300 milliGRAM(s) Oral three times a day    PRN Inpatient Medications  acetaminophen   Tablet 650 milliGRAM(s) Oral every 6 hours PRN  acetaminophen   Tablet. 650 milliGRAM(s) Oral every 6 hours PRN  benzonatate 100 milliGRAM(s) Oral every 6 hours PRN  dextrose Gel 1 Dose(s) Oral once PRN  glucagon  Injectable 1 milliGRAM(s) IntraMuscular once PRN  guaiFENesin   Syrup  (Sugar-Free) 100 milliGRAM(s) Oral every 6 hours PRN      REVIEW OF SYSTEMS  --------------------------------------------------------------------------------  Gen: No weakness  Skin: No rashes  Head/Eyes/Ears/Mouth: No headache  Respiratory: No dyspnea  CV: No chest pain, PND, orthopnea  GI: No abdominal pain, diarrhea  : No increased frequency  MSK: No edema  Neuro: No dizziness/lightheadedness  Heme: No bleeding    All other systems were reviewed and are negative, except as noted.    VITALS/PHYSICAL EXAM  --------------------------------------------------------------------------------  T(C): 36.7 (02-25-18 @ 05:27), Max: 37.1 (02-24-18 @ 14:28)  HR: 148 (02-25-18 @ 05:27) (66 - 148)  BP: 148/63 (02-25-18 @ 05:27) (139/64 - 152/65)  RR: 18 (02-25-18 @ 05:27) (18 - 18)  SpO2: 97% (02-25-18 @ 05:27) (96% - 98%)  Wt(kg): --        02-24-18 @ 07:01  -  02-25-18 @ 07:00  --------------------------------------------------------  IN: 480 mL / OUT: 700 mL / NET: -220 mL    02-25-18 @ 07:01  -  02-25-18 @ 11:30  --------------------------------------------------------  IN: 0 mL / OUT: 200 mL / NET: -200 mL      Physical Exam:  	Gen: Resting in bed   	HEENT: No JVD  	Pulm: CTA b/l   	CV: S1S2+  	Abd:  soft  	LE: Right BKA, LLE edema present, left foot dressing seen   	Neuro: Awake and alert   	Psych: Normal affect and mood  	Skin: Warm    LABS/STUDIES  --------------------------------------------------------------------------------              7.6    8.22  >-----------<  344      [02-25-18 @ 06:15]              23.8     142  |  104  |  82  ----------------------------<  69      [02-25-18 @ 06:15]  4.5   |  19  |  5.77        Ca     7.9     [02-25-18 @ 06:15]      Creatinine Trend:  SCr 5.77 [02-25 @ 06:15]  SCr 6.21 [02-24 @ 06:00]  SCr 6.30 [02-23 @ 07:30]  SCr 6.13 [02-22 @ 06:40]  SCr 5.81 [02-21 @ 06:15]    Urinalysis - [02-25-18 @ 04:50]      Color PLYEL / Appearance CLEAR / SG 1.015 / pH 5.5      Gluc NEGATIVE / Ketone NEGATIVE  / Bili NEGATIVE / Urobili NORMAL       Blood TRACE / Protein 100 / Leuk Est TRACE / Nitrite NEGATIVE      RBC 0-2 / WBC 2-5 / Hyaline  / Gran  / Sq Epi OCC / Non Sq Epi  / Bacteria     Urine Creatinine 112.75      [02-25-18 @ 04:50]  Urine Protein 55.9      [02-25-18 @ 04:50]    Iron 21, TIBC 163, %sat --      [02-20-18 @ 05:00]  Ferritin 540.7      [02-20-18 @ 05:00]  HbA1c 5.9      [02-13-18 @ 07:11] [facility] DIVISION OF KIDNEY DISEASES AND HYPERTENSION -- FOLLOW UP NOTE  --------------------------------------------------------------------------------    HPI: 63-year-old male with PMH of HTN, DM, right BKA, and CKD admitted for left foot infection. As per review of labs on [place]/[employer], Scr was 1.51 in 3/2017. As her pt. PMD's office, Scr checked in 7/2017 was 2.30. Labs on admission (2/12) showed elevated Scr of 3.8 which is elevated. Pt Scr however improved to 5.77 today. Pt. seen and evaluated at bedside. Patient has no complaints and feels well.     PAST HISTORY  --------------------------------------------------------------------------------  No significant changes to PMH, PSH, FHx, SHx, unless otherwise noted    ALLERGIES & MEDICATIONS  --------------------------------------------------------------------------------  Allergies    No Known Allergies    Intolerances      Standing Inpatient Medications  atorvastatin 40 milliGRAM(s) Oral at bedtime  cyanocobalamin 1000 MICROGram(s) Oral daily  dextrose 5%. 1000 milliLiter(s) IV Continuous <Continuous>  dextrose 50% Injectable 12.5 Gram(s) IV Push once  dextrose 50% Injectable 25 Gram(s) IV Push once  dextrose 50% Injectable 25 Gram(s) IV Push once  influenza   Vaccine 0.5 milliLiter(s) IntraMuscular once  insulin lispro (HumaLOG) corrective regimen sliding scale   SubCutaneous three times a day before meals  labetalol 300 milliGRAM(s) Oral three times a day  lactobacillus acidophilus 1 Tablet(s) Oral daily  NIFEdipine XL 30 milliGRAM(s) Oral daily  silver sulfADIAZINE 1% Cream 1 Application(s) Topical daily  sodium bicarbonate 1300 milliGRAM(s) Oral three times a day    PRN Inpatient Medications  acetaminophen   Tablet 650 milliGRAM(s) Oral every 6 hours PRN  acetaminophen   Tablet. 650 milliGRAM(s) Oral every 6 hours PRN  benzonatate 100 milliGRAM(s) Oral every 6 hours PRN  dextrose Gel 1 Dose(s) Oral once PRN  glucagon  Injectable 1 milliGRAM(s) IntraMuscular once PRN  guaiFENesin   Syrup  (Sugar-Free) 100 milliGRAM(s) Oral every 6 hours PRN      REVIEW OF SYSTEMS  --------------------------------------------------------------------------------  Gen: No weakness  Respiratory: No dyspnea  CV: No chest pain, PND, orthopnea  GI: No abdominal pain, diarrhea  : urine output improving    VITALS/PHYSICAL EXAM  --------------------------------------------------------------------------------  T(C): 36.7 (02-25-18 @ 05:27), Max: 37.1 (02-24-18 @ 14:28)  HR: 148 (02-25-18 @ 05:27) (66 - 148)  BP: 148/63 (02-25-18 @ 05:27) (139/64 - 152/65)  RR: 18 (02-25-18 @ 05:27) (18 - 18)  SpO2: 97% (02-25-18 @ 05:27) (96% - 98%)  Wt(kg): --        02-24-18 @ 07:01  -  02-25-18 @ 07:00  --------------------------------------------------------  IN: 480 mL / OUT: 700 mL / NET: -220 mL    02-25-18 @ 07:01  -  02-25-18 @ 11:30  --------------------------------------------------------  IN: 0 mL / OUT: 200 mL / NET: -200 mL      Physical Exam:  	Gen: Resting in bed   	HEENT: No JVD  	Pulm: CTA b/l   	CV: S1S2+  	Abd:  soft  	LE: Right BKA, LLE edema present, left foot dressing seen   	Neuro: Awake and alert   	Psych: Normal affect and mood  	Skin: Warm    LABS/STUDIES  --------------------------------------------------------------------------------              7.6    8.22  >-----------<  344      [02-25-18 @ 06:15]              23.8     142  |  104  |  82  ----------------------------<  69      [02-25-18 @ 06:15]  4.5   |  19  |  5.77        Ca     7.9     [02-25-18 @ 06:15]      Creatinine Trend:  SCr 5.77 [02-25 @ 06:15]  SCr 6.21 [02-24 @ 06:00]  SCr 6.30 [02-23 @ 07:30]  SCr 6.13 [02-22 @ 06:40]  SCr 5.81 [02-21 @ 06:15]    Urinalysis - [02-25-18 @ 04:50]      Color PLYEL / Appearance CLEAR / SG 1.015 / pH 5.5      Gluc NEGATIVE / Ketone NEGATIVE  / Bili NEGATIVE / Urobili NORMAL       Blood TRACE / Protein 100 / Leuk Est TRACE / Nitrite NEGATIVE      RBC 0-2 / WBC 2-5 / Hyaline  / Gran  / Sq Epi OCC / Non Sq Epi  / Bacteria     Urine Creatinine 112.75      [02-25-18 @ 04:50]  Urine Protein 55.9      [02-25-18 @ 04:50]    Iron 21, TIBC 163, %sat --      [02-20-18 @ 05:00]  Ferritin 540.7      [02-20-18 @ 05:00]  HbA1c 5.9      [02-13-18 @ 07:11]

## 2018-02-25 NOTE — PROGRESS NOTE ADULT - ATTENDING COMMENTS
Patient's volume status improved and urination has improved.  He seems to have begun to resolve his ATN.

## 2018-02-25 NOTE — PROGRESS NOTE ADULT - PROBLEM SELECTOR PLAN 2
-Evolving ATN on CKD stage III in the setting of sepsis. Creatinine  today. Slightly down from 6.3 yesterday  -Patient still urinating, but very little. Will obtain UA for nephrology team. Patient has B lines throughout likely 2/2 fluid overload. Will need to monitor urine outputs better. Spoke to nephrology today. If becomes SOB, will give 80 Lasix IV  -Renal consult appreciated. Renal u/s negative. Spun down urine demonstrating granular casts suggesting ATN. Baseline creatinine is 2.3.   -Avoiding nephrotoxins and RCA.  -Sodium bicarb TID  -Patient has had discussions about likely need for HD. Pt understanding. -Evolving ATN on CKD stage III in the setting of sepsis. Creatinine 5.77 today. Slightly down from 6.12 yesterday  -Patient still urinating, but very little.  Continuing to monitor urine outputs.  -Pt is still fluid overloaded and If becomes SOB, will give 80 Lasix IV and consider bipap.  -Renal consult appreciated. Renal u/s negative. Spun down urine demonstrating granular casts suggesting ATN. Baseline creatinine is 2.3.   -Avoiding nephrotoxins and RCA.  -Sodium bicarb TID with improving bicarb.

## 2018-02-25 NOTE — PROGRESS NOTE ADULT - ATTENDING COMMENTS
Pt seen and examined. Urine output and renal function improving. Case d/w nephrology. Continue to monitor. Continue wound care.

## 2018-02-25 NOTE — PROGRESS NOTE ADULT - PROBLEM SELECTOR PLAN 1
Pt. with JANY on CKD in the setting of infection and diarrhea. CKD in the setting of long-standing DM. Scr was 1.5 in 3/2017, increased to 2.30 in 7/2017. Scr on admission (2/12) was elevated at 3.81. Patient Scr increased during his stay. Patient now with improved Scr to 5.77. Pt. non-oliguric with likely resolving ATN. Monitor BMP and urine output. Avoid any potential nephrotoxins

## 2018-02-25 NOTE — PROGRESS NOTE ADULT - SUBJECTIVE AND OBJECTIVE BOX
Progress Note    JAMES PATRICK 63y (1955) Male [id number]  18 (13d)    DORON Thibodeaux PGY1  Pager# [phone]    Chief Complaint: 63M PMH DM, HTN, CKD p/w L foot pain x 3 days.    Subjective:  No acute events overnight. Patient seen and examined at bedside.    Review of Systems:  CONSTITUTIONAL: No fever, weight loss, or fatigue  EYES: No eye pain, visual disturbances, or discharge  ENMT:  No difficulty hearing, tinnitus, vertigo; No sinus or throat pain  NECK: No pain or stiffness  RESPIRATORY: No cough, wheezing, chills or hemoptysis; No shortness of breath  CARDIOVASCULAR: No chest pain, palpitations, dizziness, or leg swelling  GASTROINTESTINAL: No abdominal or epigastric pain. No nausea, vomiting, or hematemesis; No diarrhea or constipation. No melena or hematochezia.  GENITOURINARY: No dysuria, frequency, hematuria, or incontinence  NEUROLOGICAL: No headaches, memory loss, loss of strength, numbness, or tremors  SKIN: No itching, burning, rashes, or lesions   LYMPH NODES: No enlarged glands  ENDOCRINE: No heat or cold intolerance; No hair loss  MUSCULOSKELETAL: No joint pain or swelling; No muscle, back, or extremity pain      PAST MEDICAL & SURGICAL HISTORY:  Kidney disease (N28.9)  HTN (hypertension) (I10)  DM (diabetes mellitus) (E11.9)  S/P BKA (below knee amputation) unilateral, right (Z89.511)  No significant past surgical history (588746818)    acetaminophen   Tablet 650 milliGRAM(s) Oral every 6 hours PRN  acetaminophen   Tablet. 650 milliGRAM(s) Oral every 6 hours PRN  atorvastatin 40 milliGRAM(s) Oral at bedtime  benzonatate 100 milliGRAM(s) Oral every 6 hours PRN  cyanocobalamin 1000 MICROGram(s) Oral daily  dextrose 5%. 1000 milliLiter(s) IV Continuous <Continuous>  dextrose 50% Injectable 12.5 Gram(s) IV Push once  dextrose 50% Injectable 25 Gram(s) IV Push once  dextrose 50% Injectable 25 Gram(s) IV Push once  dextrose Gel 1 Dose(s) Oral once PRN  glucagon  Injectable 1 milliGRAM(s) IntraMuscular once PRN  guaiFENesin   Syrup  (Sugar-Free) 100 milliGRAM(s) Oral every 6 hours PRN  influenza   Vaccine 0.5 milliLiter(s) IntraMuscular once  insulin lispro (HumaLOG) corrective regimen sliding scale   SubCutaneous three times a day before meals  labetalol 300 milliGRAM(s) Oral three times a day  lactobacillus acidophilus 1 Tablet(s) Oral daily  NIFEdipine XL 30 milliGRAM(s) Oral daily  silver sulfADIAZINE 1% Cream 1 Application(s) Topical daily  sodium bicarbonate 1300 milliGRAM(s) Oral three times a day    Objective:  T(C): 36.7 (18 @ 05:27), Max: 37.1 (18 @ 14:28)  HR: 148 (18 @ 05:27) (66 - 148)  BP: 148/63 (18 @ 05:27) (139/64 - 152/65)  RR: 18 (18 @ 05:27) (18 - 18)  SpO2: 97% (18 @ 05:27) (96% - 98%)    Physical exam:  GENERAL: NAD, well-developed  HEAD:  Atraumatic, Normocephalic  EYES: EOMI, PERRLA, conjunctiva and sclera clear  NECK: Supple, No JVD  CHEST/LUNG: Clear to auscultation bilaterally; No wheeze  HEART: Regular rate and rhythm; No murmurs, rubs, or gallops  ABDOMEN: Soft, Nontender, Nondistended; Bowel sounds present  EXTREMITIES:  LLE edema. Sole healing. Minimal drainage. Blisters persisting but not getting worse.   PSYCH: AAOx3  NEUROLOGY: non-focal  SKIN: See extremities exam      18 @ 07:01  -  18 @ 07:00  --------------------------------------------------------  IN: 240 mL / OUT: 425 mL / NET: -185 mL    18 @ 07:01  -  18 @ 06:48  --------------------------------------------------------  IN: 480 mL / OUT: 700 mL / NET: -220 mL        CAPILLARY BLOOD GLUCOSE      ( @ 06:00)                      7.7  11.15 )-----------( 303                 23.7    Neutrophils = -- (--%)  Lymphocytes = -- (--%)  Eosinophils = -- (--%)  Basophils = -- (--%)  Monocytes = -- (--%)  Bands = --%        141  |  104  |  81<H>  ----------------------------<  107<H>  4.7   |  18<L>  |  6.21<H>    Ca    8.0<L>      2018 06:00      Urinalysis Basic - ( 2018 13:20 )    Color: PLYEL / Appearance: CLEAR / S.016 / pH: 5.0  Gluc: NEGATIVE / Ketone: NEGATIVE  / Bili: NEGATIVE / Urobili: NORMAL mg/dL   Blood: TRACE / Protein: 100 mg/dL / Nitrite: NEGATIVE   Leuk Esterase: NEGATIVE / RBC: 0-2 / WBC 2-5   Sq Epi: OCC / Non Sq Epi: x / Bacteria: x        WBC Trend: 11.15<--, 10.89<--, 10.83<--    Hb Trend: 7.7<--, 8.0<--, 7.9<--, 8.1<--, 7.8<--        New imaging in last 24 hours:  Consult notes reviewed: nephrology Progress Note    JAMES PATRICK 63y (1955) Male [id number]  18 (13d)    Dr. Galvez [facility] PGY1  Pager# [phone]    Chief Complaint: 63M PMH DM, HTN, CKD p/w L foot pain x 3 days.    Subjective:  No acute events overnight. Patient seen and examined at bedside. No events overnight. Still urinating. Reports diarrhea improved from TID to BID.     Review of Systems:  CONSTITUTIONAL: No fever, weight loss, or fatigue  EYES: No eye pain, visual disturbances, or discharge  ENMT:  No difficulty hearing, tinnitus, vertigo; No sinus or throat pain  NECK: No pain or stiffness  RESPIRATORY: No cough, wheezing, chills or hemoptysis; No shortness of breath  CARDIOVASCULAR: No chest pain, palpitations, dizziness, or leg swelling  GASTROINTESTINAL: No abdominal or epigastric pain. No nausea, vomiting, or hematemesis; No diarrhea or constipation. No melena or hematochezia.  GENITOURINARY: No dysuria, frequency, hematuria, or incontinence  NEUROLOGICAL: No headaches, memory loss, loss of strength, numbness, or tremors  SKIN: No itching, burning, rashes, or lesions   MUSCULOSKELETAL: No joint pain or swelling; No muscle, back, or extremity pain      PAST MEDICAL & SURGICAL HISTORY:  Kidney disease (N28.9)  HTN (hypertension) (I10)  DM (diabetes mellitus) (E11.9)  S/P BKA (below knee amputation) unilateral, right (Z89.511)  No significant past surgical history ([id number])    acetaminophen   Tablet 650 milliGRAM(s) Oral every 6 hours PRN  acetaminophen   Tablet. 650 milliGRAM(s) Oral every 6 hours PRN  atorvastatin 40 milliGRAM(s) Oral at bedtime  benzonatate 100 milliGRAM(s) Oral every 6 hours PRN  cyanocobalamin 1000 MICROGram(s) Oral daily  dextrose 5%. 1000 milliLiter(s) IV Continuous <Continuous>  dextrose 50% Injectable 12.5 Gram(s) IV Push once  dextrose 50% Injectable 25 Gram(s) IV Push once  dextrose 50% Injectable 25 Gram(s) IV Push once  dextrose Gel 1 Dose(s) Oral once PRN  glucagon  Injectable 1 milliGRAM(s) IntraMuscular once PRN  guaiFENesin   Syrup  (Sugar-Free) 100 milliGRAM(s) Oral every 6 hours PRN  influenza   Vaccine 0.5 milliLiter(s) IntraMuscular once  insulin lispro (HumaLOG) corrective regimen sliding scale   SubCutaneous three times a day before meals  labetalol 300 milliGRAM(s) Oral three times a day  lactobacillus acidophilus 1 Tablet(s) Oral daily  NIFEdipine XL 30 milliGRAM(s) Oral daily  silver sulfADIAZINE 1% Cream 1 Application(s) Topical daily  sodium bicarbonate 1300 milliGRAM(s) Oral three times a day    Objective:  T(C): 36.7 (18 @ 05:27), Max: 37.1 (18 @ 14:28)  HR: 148 (18 @ 05:27) (66 - 148)  BP: 148/63 (18 @ 05:27) (139/64 - 152/65)  RR: 18 (18 @ 05:27) (18 - 18)  SpO2: 97% (18 @ 05:27) (96% - 98%)    Physical exam:  GENERAL: NAD, well-developed  HEAD:  Atraumatic, Normocephalic  EYES: EOMI, PERRLA, conjunctiva and sclera clear  NECK: Supple, No JVD  CHEST/LUNG: Diminished breath sounds at bases.  HEART: Regular rate and rhythm; No murmurs, rubs, or gallops  ABDOMEN: Soft, Nontender, Nondistended; Bowel sounds present  EXTREMITIES:  LLE edema. Sole healing. Minimal drainage. Blisters persisting but not getting worse.   PSYCH: AAOx3  NEUROLOGY: non-focal  SKIN: See extremities exam      18 @ 07:01  -  18 @ 07:00  --------------------------------------------------------  IN: 240 mL / OUT: 425 mL / NET: -185 mL    18 @ 07:01  -  18 @ 06:48  --------------------------------------------------------  IN: 480 mL / OUT: 700 mL / NET: -220 mL        CAPILLARY BLOOD GLUCOSE      ( @ 06:00)                      7.7  11.15 )-----------( 303                 23.7    Neutrophils = -- (--%)  Lymphocytes = -- (--%)  Eosinophils = -- (--%)  Basophils = -- (--%)  Monocytes = -- (--%)  Bands = --%        141  |  104  |  81<H>  ----------------------------<  107<H>  4.7   |  18<L>  |  6.21<H>    Ca    8.0<L>      2018 06:00      Urinalysis Basic - ( 2018 13:20 )    Color: PLYEL / Appearance: CLEAR / S.016 / pH: 5.0  Gluc: NEGATIVE / Ketone: NEGATIVE  / Bili: NEGATIVE / Urobili: NORMAL mg/dL   Blood: TRACE / Protein: 100 mg/dL / Nitrite: NEGATIVE   Leuk Esterase: NEGATIVE / RBC: 0-2 / WBC 2-5   Sq Epi: OCC / Non Sq Epi: x / Bacteria: x        WBC Trend: 11.15<--, 10.89<--, 10.83<--    Hb Trend: 7.7<--, 8.0<--, 7.9<--, 8.1<--, 7.8<--        New imaging in last 24 hours:  Consult notes reviewed: nephrology

## 2018-02-25 NOTE — PROGRESS NOTE ADULT - PROBLEM SELECTOR PLAN 3
-Hgb  this morning.   -Retic count normal. Ferritin high. Likely 2/2 in setting of decreased EPO production d/t worsening kidney function. Hemolysis less likely. T bili is WNL. Acute blood loss also less likely as no source. -Hgb 7.6 this morning.   -Retic count normal. Ferritin high. Likely 2/2 in setting of decreased EPO production d/t worsening kidney function. Hemolysis less likely. T bili is WNL. Acute blood loss also less likely as no source.

## 2018-02-25 NOTE — PROGRESS NOTE ADULT - ASSESSMENT
63M PMH DM on januvia, FS 80s at home, HTN, CKD, R BKA in 2009 presents with 3 day history of worsening foot pain concerning for cellulitis, currently stable. ATN still evolving 63M PMH DM on januvia, FS 80s at home, HTN, CKD, R BKA in 2009 presents with 3 day history of worsening foot pain concerning for cellulitis, currently stable. ATN is improving.

## 2018-02-25 NOTE — PROGRESS NOTE ADULT - PROBLEM SELECTOR PLAN 6
-Pt on oral medications at home.   -Start on sliding scale.  -Hemoglobin a1c: 5.9%  -Diabetic precautions  -FS WNL while here

## 2018-02-25 NOTE — PROGRESS NOTE ADULT - PROBLEM SELECTOR PLAN 5
-Labetalol 300mg TID  -Likely in setting of worsening creatinine. -Labetalol 300mg TID  -Likely in setting of worsening creatinine. Have been better controlled.

## 2018-02-26 LAB
BUN SERPL-MCNC: 78 MG/DL — HIGH (ref 7–23)
CALCIUM SERPL-MCNC: 7.9 MG/DL — LOW (ref 8.4–10.5)
CHLORIDE SERPL-SCNC: 106 MMOL/L — SIGNIFICANT CHANGE UP (ref 98–107)
CO2 SERPL-SCNC: 19 MMOL/L — LOW (ref 22–31)
CREAT SERPL-MCNC: 5.43 MG/DL — HIGH (ref 0.5–1.3)
GLUCOSE BLDC GLUCOMTR-MCNC: 117 MG/DL — HIGH (ref 70–99)
GLUCOSE BLDC GLUCOMTR-MCNC: 127 MG/DL — HIGH (ref 70–99)
GLUCOSE BLDC GLUCOMTR-MCNC: 203 MG/DL — HIGH (ref 70–99)
GLUCOSE BLDC GLUCOMTR-MCNC: 94 MG/DL — SIGNIFICANT CHANGE UP (ref 70–99)
GLUCOSE SERPL-MCNC: 99 MG/DL — SIGNIFICANT CHANGE UP (ref 70–99)
HCT VFR BLD CALC: 22.7 % — LOW (ref 39–50)
HGB BLD-MCNC: 7.4 G/DL — LOW (ref 13–17)
KAPPA FREE LIGHT CHAINS, SERUM: 20.5 MG/DL — HIGH (ref 0.33–1.94)
LAMBDA FREE LIGHT CHAINS, SERUM: 11.2 MG/DL — HIGH (ref 0.57–2.63)
MCHC RBC-ENTMCNC: 27.9 PG — SIGNIFICANT CHANGE UP (ref 27–34)
MCHC RBC-ENTMCNC: 32.6 % — SIGNIFICANT CHANGE UP (ref 32–36)
MCV RBC AUTO: 85.7 FL — SIGNIFICANT CHANGE UP (ref 80–100)
NRBC # FLD: 0 — SIGNIFICANT CHANGE UP
PLATELET # BLD AUTO: 344 K/UL — SIGNIFICANT CHANGE UP (ref 150–400)
PMV BLD: 11.2 FL — SIGNIFICANT CHANGE UP (ref 7–13)
POTASSIUM SERPL-MCNC: 4.7 MMOL/L — SIGNIFICANT CHANGE UP (ref 3.5–5.3)
POTASSIUM SERPL-SCNC: 4.7 MMOL/L — SIGNIFICANT CHANGE UP (ref 3.5–5.3)
RBC # BLD: 2.65 M/UL — LOW (ref 4.2–5.8)
RBC # FLD: 14.3 % — SIGNIFICANT CHANGE UP (ref 10.3–14.5)
SODIUM SERPL-SCNC: 143 MMOL/L — SIGNIFICANT CHANGE UP (ref 135–145)
WBC # BLD: 8.56 K/UL — SIGNIFICANT CHANGE UP (ref 3.8–10.5)
WBC # FLD AUTO: 8.56 K/UL — SIGNIFICANT CHANGE UP (ref 3.8–10.5)

## 2018-02-26 PROCEDURE — 99232 SBSQ HOSP IP/OBS MODERATE 35: CPT | Mod: GC

## 2018-02-26 PROCEDURE — 99232 SBSQ HOSP IP/OBS MODERATE 35: CPT

## 2018-02-26 PROCEDURE — 99233 SBSQ HOSP IP/OBS HIGH 50: CPT | Mod: GC

## 2018-02-26 RX ADMIN — Medication 1 APPLICATION(S): at 11:03

## 2018-02-26 RX ADMIN — Medication 1300 MILLIGRAM(S): at 21:37

## 2018-02-26 RX ADMIN — Medication 300 MILLIGRAM(S): at 21:37

## 2018-02-26 RX ADMIN — ATORVASTATIN CALCIUM 40 MILLIGRAM(S): 80 TABLET, FILM COATED ORAL at 21:37

## 2018-02-26 RX ADMIN — Medication 1300 MILLIGRAM(S): at 05:36

## 2018-02-26 RX ADMIN — Medication 1 TABLET(S): at 11:03

## 2018-02-26 RX ADMIN — PREGABALIN 1000 MICROGRAM(S): 225 CAPSULE ORAL at 11:03

## 2018-02-26 RX ADMIN — Medication 1300 MILLIGRAM(S): at 13:36

## 2018-02-26 RX ADMIN — Medication 300 MILLIGRAM(S): at 05:36

## 2018-02-26 RX ADMIN — Medication 30 MILLIGRAM(S): at 05:36

## 2018-02-26 NOTE — PROGRESS NOTE ADULT - ATTENDING COMMENTS
see separate note I entered on this date.  in short- wound continues to improve, renal function continues to recover.  will need angio.  concern for HFpEF

## 2018-02-26 NOTE — PROGRESS NOTE ADULT - ASSESSMENT
63M PMH DM on januvia, FS 80s at home, HTN, CKD, R BKA in 2009 presents with 3 day history of worsening foot pain concerning for cellulitis, currently stable. ATN is improving.

## 2018-02-26 NOTE — PROGRESS NOTE ADULT - ASSESSMENT
62 y/o male with PMH of HTN, DM, right BKA and CKD admitted with left foot infection. Pt. now with JNAY.

## 2018-02-26 NOTE — PROGRESS NOTE ADULT - SUBJECTIVE AND OBJECTIVE BOX
Patient is a 63y old  Male who presents with a chief complaint of 63M PMH DM, HTN, CKD p/w L foot pain x 3 days. (19 Feb 2018 10:39)      Vascular Surgery Attending Progress Note    Interval HPI: pt w/o c/o     Medications:  acetaminophen   Tablet 650 milliGRAM(s) Oral every 6 hours PRN  acetaminophen   Tablet. 650 milliGRAM(s) Oral every 6 hours PRN  atorvastatin 40 milliGRAM(s) Oral at bedtime  benzonatate 100 milliGRAM(s) Oral every 6 hours PRN  cyanocobalamin 1000 MICROGram(s) Oral daily  dextrose 5%. 1000 milliLiter(s) IV Continuous <Continuous>  dextrose 50% Injectable 12.5 Gram(s) IV Push once  dextrose 50% Injectable 25 Gram(s) IV Push once  dextrose 50% Injectable 25 Gram(s) IV Push once  dextrose Gel 1 Dose(s) Oral once PRN  glucagon  Injectable 1 milliGRAM(s) IntraMuscular once PRN  guaiFENesin   Syrup  (Sugar-Free) 100 milliGRAM(s) Oral every 6 hours PRN  influenza   Vaccine 0.5 milliLiter(s) IntraMuscular once  insulin lispro (HumaLOG) corrective regimen sliding scale   SubCutaneous three times a day before meals  labetalol 300 milliGRAM(s) Oral three times a day  lactobacillus acidophilus 1 Tablet(s) Oral daily  NIFEdipine XL 30 milliGRAM(s) Oral daily  silver sulfADIAZINE 1% Cream 1 Application(s) Topical daily  sodium bicarbonate 1300 milliGRAM(s) Oral three times a day      Vital Signs Last 24 Hrs  T(C): 36.7 (26 Feb 2018 12:42), Max: 36.9 (25 Feb 2018 22:00)  T(F): 98 (26 Feb 2018 12:42), Max: 98.5 (25 Feb 2018 22:00)  HR: 64 (26 Feb 2018 12:42) (62 - 70)  BP: 142/65 (26 Feb 2018 12:42) (139/55 - 165/68)  BP(mean): --  RR: 18 (26 Feb 2018 12:42) (18 - 18)  SpO2: 92% (26 Feb 2018 12:42) (92% - 96%)  I&O's Summary    25 Feb 2018 07:01  -  26 Feb 2018 07:00  --------------------------------------------------------  IN: 120 mL / OUT: 500 mL / NET: -380 mL    26 Feb 2018 07:01  -  26 Feb 2018 13:59  --------------------------------------------------------  IN: 0 mL / OUT: 450 mL / NET: -450 mL        Physical Exam:  Neuro  A&Ox3 VSS  Vascular:  lt toe wound stable w limited healing   Musculoskeletal: wnl    LABS:                        7.4    8.56  )-----------( 344      ( 26 Feb 2018 06:05 )             22.7     02-26    143  |  106  |  78<H>  ----------------------------<  99  4.7   |  19<L>  |  5.43<H>    Ca    7.9<L>      26 Feb 2018 06:05          BARI NICOLAS MD  [phone]

## 2018-02-26 NOTE — PROGRESS NOTE ADULT - PROBLEM SELECTOR PLAN 1
-L foot with cellulitis with resulting sepsis which is now improved.  -ID is following; recs appreciated. S/p vancomycin and zosyn. Completed Unasyn on 2/22.  -Podiatry has evaluated patient and report good improvement. recs appreciated.   -Surgery has been consulted and are following. Want an angiogram; scheduling for when creatinine is stable. Will re-assess as creatinine is improving.  -Wound culture growing Acinetobacter, corynebacterium, staph haemolyticus and staph aureus. Blood cx with NGTD.  -LLE xrays negative for free air; CT read negative for free air.

## 2018-02-26 NOTE — PROGRESS NOTE ADULT - SUBJECTIVE AND OBJECTIVE BOX
pt seen at bedside in NAD. dressing to left foot c/d/i  ulceration plantar foot noted from forefoot to midfoot  superficial appears to be a burn no pus erythema decreased mild edema  b/l LE slowly improving  applied betadine with DSD  follow up angio plan   will continue to follow 2-3x weeks if foot worsens please call [phone] ASAP

## 2018-02-26 NOTE — PROGRESS NOTE ADULT - PROBLEM SELECTOR PLAN 2
-ATN on CKD stage III in the setting of sepsis. Creatinine 5.44 today. Slightly down from 6.12 yesterday  -Patient still urinating, but very little.  Continuing to monitor urine outputs.  -Pt is still fluid overloaded and If becomes SOB, will give 80 Lasix IV and consider bipap.  -Renal consult appreciated. Renal u/s negative. Spun down urine demonstrating granular casts suggesting ATN. Baseline creatinine is 2.3.   -Avoiding nephrotoxins and RCA.  -Sodium bicarb TID with improving bicarb.

## 2018-02-26 NOTE — PROGRESS NOTE ADULT - SUBJECTIVE AND OBJECTIVE BOX
Progress Note    JAMES PATRICK 63y (1955) Male [id number]  18 (14d)    Dr. Galvez [facility] PGY1  Pager# [phone]    Chief Complaint: 63M PMH DM, HTN, CKD p/w L foot pain x 3 days.    Subjective:  No acute events overnight. Patient seen and examined at bedside. Doing well. Diarrhea down to once per day. No nausea or vomiting. Denies foot pain. Urinating.    Review of Systems:  CONSTITUTIONAL: No fever, weight loss, or fatigue  EYES: No eye pain, visual disturbances, or discharge  ENMT:  No difficulty hearing, tinnitus, vertigo; No sinus or throat pain  NECK: No pain or stiffness  RESPIRATORY: No cough, wheezing, chills or hemoptysis; No shortness of breath  CARDIOVASCULAR: No chest pain, palpitations, dizziness, or leg swelling  GASTROINTESTINAL: No abdominal or epigastric pain. No nausea, vomiting, or hematemesis; No diarrhea or constipation. No melena or hematochezia.  GENITOURINARY: No dysuria, frequency, hematuria, or incontinence  NEUROLOGICAL: No headaches, memory loss, loss of strength, numbness, or tremors  MUSCULOSKELETAL: No joint pain or swelling; No muscle, back, or extremity pain      PAST MEDICAL & SURGICAL HISTORY:  Kidney disease (N28.9)  HTN (hypertension) (I10)  DM (diabetes mellitus) (E11.9)  S/P BKA (below knee amputation) unilateral, right (Z89.511)  No significant past surgical history (604833218)    acetaminophen   Tablet 650 milliGRAM(s) Oral every 6 hours PRN  acetaminophen   Tablet. 650 milliGRAM(s) Oral every 6 hours PRN  atorvastatin 40 milliGRAM(s) Oral at bedtime  benzonatate 100 milliGRAM(s) Oral every 6 hours PRN  cyanocobalamin 1000 MICROGram(s) Oral daily  dextrose 5%. 1000 milliLiter(s) IV Continuous <Continuous>  dextrose 50% Injectable 12.5 Gram(s) IV Push once  dextrose 50% Injectable 25 Gram(s) IV Push once  dextrose 50% Injectable 25 Gram(s) IV Push once  dextrose Gel 1 Dose(s) Oral once PRN  glucagon  Injectable 1 milliGRAM(s) IntraMuscular once PRN  guaiFENesin   Syrup  (Sugar-Free) 100 milliGRAM(s) Oral every 6 hours PRN  influenza   Vaccine 0.5 milliLiter(s) IntraMuscular once  insulin lispro (HumaLOG) corrective regimen sliding scale   SubCutaneous three times a day before meals  labetalol 300 milliGRAM(s) Oral three times a day  lactobacillus acidophilus 1 Tablet(s) Oral daily  NIFEdipine XL 30 milliGRAM(s) Oral daily  silver sulfADIAZINE 1% Cream 1 Application(s) Topical daily  sodium bicarbonate 1300 milliGRAM(s) Oral three times a day    Objective:  T(C): 36.7 (18 @ 12:42), Max: 36.9 (18 @ 22:00)  HR: 64 (18 @ 12:42) (62 - 70)  BP: 142/65 (18 @ 12:42) (139/55 - 165/68)  RR: 18 (18 @ 12:42) (18 - 18)  SpO2: 92% (18 @ 12:42) (92% - 96%)    Physical exam:  GENERAL: NAD, well-developed  HEAD:  Atraumatic, Normocephalic  EYES: EOMI, PERRLA, conjunctiva and sclera clear  NECK: Supple, No JVD  CHEST/LUNG: Clear to auscultation bilaterally with decreased breath sounds in the bilateral bases.  HEART: Regular rate and rhythm; No murmurs, rubs, or gallops  ABDOMEN: Soft, Nontender, Nondistended; Bowel sounds present  EXTREMITIES:  left foot: improved. Skin is dry with a few areas of bleeding after pulling off bandage.   PSYCH: AAOx3  NEUROLOGY: non-focal  SKIN: No rashes or lesions      18 @ 07:01  -  18 @ 07:00  --------------------------------------------------------  IN: 120 mL / OUT: 500 mL / NET: -380 mL    18 @ 07:01  -  18 @ 18:54  --------------------------------------------------------  IN: 0 mL / OUT: 750 mL / NET: -750 mL        CAPILLARY BLOOD GLUCOSE      ( @ 06:05)                      7.4  8.56 )-----------( 344                 22.7    Neutrophils = -- (--%)  Lymphocytes = -- (--%)  Eosinophils = -- (--%)  Basophils = -- (--%)  Monocytes = -- (--%)  Bands = --%        143  |  106  |  78<H>  ----------------------------<  99  4.7   |  19<L>  |  5.43<H>    Ca    7.9<L>      2018 06:05          Urinalysis Basic - ( 2018 04:50 )    Color: PLYEL / Appearance: CLEAR / S.015 / pH: 5.5  Gluc: NEGATIVE / Ketone: NEGATIVE  / Bili: NEGATIVE / Urobili: NORMAL mg/dL   Blood: TRACE / Protein: 100 mg/dL / Nitrite: NEGATIVE   Leuk Esterase: TRACE / RBC: 0-2 / WBC 2-5   Sq Epi: OCC / Non Sq Epi: x / Bacteria: x        WBC Trend: 8.56<--, 8.22<--, 11.15<--    Hb Trend: 7.4<--, 7.6<--, 7.7<--, 8.0<--, 7.9<--        New imaging in last 24 hours:  Consult notes reviewed: Progress Note    JAMES PATRICK 63y (1955) Male [id number]  18 (14d)    Dr. Galvez [facility] PGY1  Pager# [phone]    Chief Complaint: 63M PMH DM, HTN, CKD p/w L foot pain x 3 days.    Subjective:  No acute events overnight. Patient seen and examined at bedside. Doing well. Diarrhea down to once per day. No nausea or vomiting. Denies foot pain. Urinating.    Review of Systems:  CONSTITUTIONAL: No fever, weight loss, or fatigue  EYES: No eye pain, visual disturbances, or discharge  ENMT:  No difficulty hearing, tinnitus, vertigo; No sinus or throat pain  NECK: No pain or stiffness  RESPIRATORY: No cough, wheezing, chills or hemoptysis; No shortness of breath  CARDIOVASCULAR: No chest pain, palpitations, dizziness, or leg swelling  GASTROINTESTINAL: No abdominal or epigastric pain. No nausea, vomiting, or hematemesis; No diarrhea or constipation. No melena or hematochezia.  GENITOURINARY: No dysuria, frequency, hematuria, or incontinence  NEUROLOGICAL: No headaches, memory loss, loss of strength, numbness, or tremors  MUSCULOSKELETAL: No joint pain or swelling; No muscle, back, or extremity pain      PAST MEDICAL & SURGICAL HISTORY:  Kidney disease (N28.9)  HTN (hypertension) (I10)  DM (diabetes mellitus) (E11.9)  S/P BKA (below knee amputation) unilateral, right (Z89.511)  No significant past surgical history (180734715)    acetaminophen   Tablet 650 milliGRAM(s) Oral every 6 hours PRN  acetaminophen   Tablet. 650 milliGRAM(s) Oral every 6 hours PRN  atorvastatin 40 milliGRAM(s) Oral at bedtime  benzonatate 100 milliGRAM(s) Oral every 6 hours PRN  cyanocobalamin 1000 MICROGram(s) Oral daily  dextrose 5%. 1000 milliLiter(s) IV Continuous <Continuous>  dextrose 50% Injectable 12.5 Gram(s) IV Push once  dextrose 50% Injectable 25 Gram(s) IV Push once  dextrose 50% Injectable 25 Gram(s) IV Push once  dextrose Gel 1 Dose(s) Oral once PRN  glucagon  Injectable 1 milliGRAM(s) IntraMuscular once PRN  guaiFENesin   Syrup  (Sugar-Free) 100 milliGRAM(s) Oral every 6 hours PRN  influenza   Vaccine 0.5 milliLiter(s) IntraMuscular once  insulin lispro (HumaLOG) corrective regimen sliding scale   SubCutaneous three times a day before meals  labetalol 300 milliGRAM(s) Oral three times a day  lactobacillus acidophilus 1 Tablet(s) Oral daily  NIFEdipine XL 30 milliGRAM(s) Oral daily  silver sulfADIAZINE 1% Cream 1 Application(s) Topical daily  sodium bicarbonate 1300 milliGRAM(s) Oral three times a day    Objective:  T(C): 36.7 (18 @ 12:42), Max: 36.9 (18 @ 22:00)  HR: 64 (18 @ 12:42) (62 - 70)  BP: 142/65 (18 @ 12:42) (139/55 - 165/68)  RR: 18 (18 @ 12:42) (18 - 18)  SpO2: 92% (18 @ 12:42) (92% - 96%)    Physical exam:  GENERAL: NAD, well-developed  HEAD:  Atraumatic, Normocephalic  EYES: EOMI, PERRLA, conjunctiva and sclera clear  NECK: Supple, No JVD  CHEST/LUNG: Clear to auscultation bilaterally with decreased breath sounds in the bilateral bases.  HEART: Regular rate and rhythm; No murmurs, rubs, or gallops  ABDOMEN: Soft, Nontender, Nondistended; Bowel sounds present  EXTREMITIES:  left foot: improved. Skin is dry with a few areas of bleeding after pulling off bandage. Left leg is still swollen. GARLAND FORD.  PSYCH: AAOx3  NEUROLOGY: non-focal  SKIN: No rashes or lesions      18 @ 07:01  -  18 @ 07:00  --------------------------------------------------------  IN: 120 mL / OUT: 500 mL / NET: -380 mL    18 @ 07:01  -  18 @ 18:54  --------------------------------------------------------  IN: 0 mL / OUT: 750 mL / NET: -750 mL        CAPILLARY BLOOD GLUCOSE      ( @ 06:05)                      7.4  8.56 )-----------( 344                 22.7    Neutrophils = -- (--%)  Lymphocytes = -- (--%)  Eosinophils = -- (--%)  Basophils = -- (--%)  Monocytes = -- (--%)  Bands = --%        143  |  106  |  78<H>  ----------------------------<  99  4.7   |  19<L>  |  5.43<H>    Ca    7.9<L>      2018 06:05          Urinalysis Basic - ( 2018 04:50 )    Color: PLYEL / Appearance: CLEAR / S.015 / pH: 5.5  Gluc: NEGATIVE / Ketone: NEGATIVE  / Bili: NEGATIVE / Urobili: NORMAL mg/dL   Blood: TRACE / Protein: 100 mg/dL / Nitrite: NEGATIVE   Leuk Esterase: TRACE / RBC: 0-2 / WBC 2-5   Sq Epi: OCC / Non Sq Epi: x / Bacteria: x        WBC Trend: 8.56<--, 8.22<--, 11.15<--    Hb Trend: 7.4<--, 7.6<--, 7.7<--, 8.0<--, 7.9<--        New imaging in last 24 hours:  Consult notes reviewed:

## 2018-02-26 NOTE — PROGRESS NOTE ADULT - PROBLEM SELECTOR PLAN 1
Pt. with JANY on CKD in the setting of infection and diarrhea. CKD in the setting of long-standing DM. Scr was 1.5 in 3/2017, increased to 2.30 in 7/2017. Scr on admission (2/12) was elevated at 3.81, which peaked to 6.3 on 2/23 and decreased to 5.43 today. Pt. with likely ATN. Monitor BMP and urine output. Avoid any potential nephrotoxins. Pt. at increased risk for radiocontrast nephropathy

## 2018-02-26 NOTE — PROGRESS NOTE ADULT - SUBJECTIVE AND OBJECTIVE BOX
patient seen and examined with resident.  foot wound without evidence of infection.  trophic changes and non-palpable DP noted.    imp- s/p sepsis from diabetic foot cellulitis.  course c/b ATN- now improving    plan- wound care.  angio upon recovery of renal fxn.

## 2018-02-26 NOTE — PROGRESS NOTE ADULT - SUBJECTIVE AND OBJECTIVE BOX
[facility] DIVISION OF KIDNEY DISEASES AND HYPERTENSION -- FOLLOW UP NOTE  --------------------------------------------------------------------------------  HPI: 63-year-old male with PMH of HTN, DM, right BKA, and CKD admitted for left foot infection. As per review of labs on [place]/[employer], Scr was 1.51 in 3/2017. As her pt. PMD's office, Scr checked in 7/2017 was 2.30. Labs on admission (2/12) showed elevated Scr of 3.8 which peaked to 6.3 on 2/23 and decreased to 5.43 today. Pt. currently resting in bed and denies SOB, CP, or N/V.       PAST HISTORY  --------------------------------------------------------------------------------  No significant changes to PMH, PSH, FHx, SHx, unless otherwise noted    ALLERGIES & MEDICATIONS  --------------------------------------------------------------------------------  Allergies    No Known Allergies    Intolerances      Standing Inpatient Medications  atorvastatin 40 milliGRAM(s) Oral at bedtime  cyanocobalamin 1000 MICROGram(s) Oral daily  dextrose 5%. 1000 milliLiter(s) IV Continuous <Continuous>  dextrose 50% Injectable 12.5 Gram(s) IV Push once  dextrose 50% Injectable 25 Gram(s) IV Push once  dextrose 50% Injectable 25 Gram(s) IV Push once  influenza   Vaccine 0.5 milliLiter(s) IntraMuscular once  insulin lispro (HumaLOG) corrective regimen sliding scale   SubCutaneous three times a day before meals  labetalol 300 milliGRAM(s) Oral three times a day  lactobacillus acidophilus 1 Tablet(s) Oral daily  NIFEdipine XL 30 milliGRAM(s) Oral daily  silver sulfADIAZINE 1% Cream 1 Application(s) Topical daily  sodium bicarbonate 1300 milliGRAM(s) Oral three times a day      REVIEW OF SYSTEMS  --------------------------------------------------------------------------------  General: no fever  CVS: no chest pain  RESP: no SOB  ABD: no abdominal pain  : no dysuria  MSK: Left leg edema/foot infection      VITALS/PHYSICAL EXAM  --------------------------------------------------------------------------------  T(C): 36.9 (02-26-18 @ 05:12), Max: 36.9 (02-25-18 @ 22:00)  HR: 66 (02-26-18 @ 05:35) (59 - 70)  BP: 139/55 (02-26-18 @ 05:12) (139/55 - 165/68)  RR: 18 (02-26-18 @ 05:12) (18 - 18)  SpO2: 96% (02-26-18 @ 05:12) (95% - 98%)  Wt(kg): --        02-25-18 @ 07:01  -  02-26-18 @ 07:00  --------------------------------------------------------  IN: 120 mL / OUT: 500 mL / NET: -380 mL      Physical Exam:  	Gen: Resting in bed   	HEENT: No JVD  	Pulm: CTA B/L  	CV: S1S2+  	Abd:  soft  	LE: Right BKA, LLE edema present, left foot dressing seen   	Neuro: Awake and alert   	Psych: Normal affect and mood  	Skin: Warm    LABS/STUDIES  --------------------------------------------------------------------------------              7.4    8.56  >-----------<  344      [02-26-18 @ 06:05]              22.7     143  |  106  |  78  ----------------------------<  99      [02-26-18 @ 06:05]  4.7   |  19  |  5.43        Ca     7.9     [02-26-18 @ 06:05]            Creatinine Trend:  SCr 5.43 [02-26 @ 06:05]  SCr 5.77 [02-25 @ 06:15]  SCr 6.21 [02-24 @ 06:00]  SCr 6.30 [02-23 @ 07:30]  SCr 6.13 [02-22 @ 06:40] [facility] DIVISION OF KIDNEY DISEASES AND HYPERTENSION -- FOLLOW UP NOTE  --------------------------------------------------------------------------------  HPI: 63-year-old male with PMH of HTN, DM, right BKA, and CKD admitted for left foot infection. As per review of labs on [place]/[employer], Scr was 1.51 in 3/2017. As per PMD's office, Scr checked in 7/2017 was 2.30. Labs on admission (2/12) showed elevated Scr of 3.8 which peaked to 6.3 on 2/23 and decreased to 5.43 today. Pt. currently resting in bed and denies SOB, CP, or N/V.     PAST HISTORY  --------------------------------------------------------------------------------  No significant changes to PMH, PSH, FHx, SHx, unless otherwise noted    ALLERGIES & MEDICATIONS  --------------------------------------------------------------------------------  Allergies    No Known Allergies    Intolerances    Standing Inpatient Medications  atorvastatin 40 milliGRAM(s) Oral at bedtime  cyanocobalamin 1000 MICROGram(s) Oral daily  dextrose 5%. 1000 milliLiter(s) IV Continuous <Continuous>  dextrose 50% Injectable 12.5 Gram(s) IV Push once  dextrose 50% Injectable 25 Gram(s) IV Push once  dextrose 50% Injectable 25 Gram(s) IV Push once  influenza   Vaccine 0.5 milliLiter(s) IntraMuscular once  insulin lispro (HumaLOG) corrective regimen sliding scale   SubCutaneous three times a day before meals  labetalol 300 milliGRAM(s) Oral three times a day  lactobacillus acidophilus 1 Tablet(s) Oral daily  NIFEdipine XL 30 milliGRAM(s) Oral daily  silver sulfADIAZINE 1% Cream 1 Application(s) Topical daily  sodium bicarbonate 1300 milliGRAM(s) Oral three times a day    REVIEW OF SYSTEMS  --------------------------------------------------------------------------------  General: no fever  CVS: no chest pain  RESP: no SOB  ABD: no abdominal pain  : no dysuria  MSK: Left leg edema/foot infection    VITALS/PHYSICAL EXAM  --------------------------------------------------------------------------------  T(C): 36.9 (02-26-18 @ 05:12), Max: 36.9 (02-25-18 @ 22:00)  HR: 66 (02-26-18 @ 05:35) (59 - 70)  BP: 139/55 (02-26-18 @ 05:12) (139/55 - 165/68)  RR: 18 (02-26-18 @ 05:12) (18 - 18)  SpO2: 96% (02-26-18 @ 05:12) (95% - 98%)  Wt(kg): --    02-25-18 @ 07:01  -  02-26-18 @ 07:00  --------------------------------------------------------  IN: 120 mL / OUT: 500 mL / NET: -380 mL      Physical Exam:  	Gen: Resting in bed   	HEENT: No JVD  	Pulm: CTA B/L  	CV: S1S2+  	Abd:  soft  	LE: Right BKA, LLE edema present, left foot dressing seen   	Neuro: Awake and alert   	Psych: Normal affect and mood  	Skin: Warm    LABS/STUDIES  --------------------------------------------------------------------------------              7.4    8.56  >-----------<  344      [02-26-18 @ 06:05]              22.7     143  |  106  |  78  ----------------------------<  99      [02-26-18 @ 06:05]  4.7   |  19  |  5.43        Ca     7.9     [02-26-18 @ 06:05]    Creatinine Trend:  SCr 5.43 [02-26 @ 06:05]  SCr 5.77 [02-25 @ 06:15]  SCr 6.21 [02-24 @ 06:00]  SCr 6.30 [02-23 @ 07:30]  SCr 6.13 [02-22 @ 06:40]

## 2018-02-26 NOTE — PROGRESS NOTE ADULT - PROBLEM SELECTOR PLAN 2
Acidosis in the setting of JANY and infection. Serum CO2 low at 19. Pt. on oral sodium bicarbonate 1300 mg TID. Monitor serum CO2

## 2018-02-27 DIAGNOSIS — R09.02 HYPOXEMIA: ICD-10-CM

## 2018-02-27 LAB
BUN SERPL-MCNC: 78 MG/DL — HIGH (ref 7–23)
CALCIUM SERPL-MCNC: 8 MG/DL — LOW (ref 8.4–10.5)
CHLORIDE SERPL-SCNC: 107 MMOL/L — SIGNIFICANT CHANGE UP (ref 98–107)
CO2 SERPL-SCNC: 22 MMOL/L — SIGNIFICANT CHANGE UP (ref 22–31)
CREAT SERPL-MCNC: 5.12 MG/DL — HIGH (ref 0.5–1.3)
GLUCOSE BLDC GLUCOMTR-MCNC: 105 MG/DL — HIGH (ref 70–99)
GLUCOSE BLDC GLUCOMTR-MCNC: 130 MG/DL — HIGH (ref 70–99)
GLUCOSE BLDC GLUCOMTR-MCNC: 141 MG/DL — HIGH (ref 70–99)
GLUCOSE BLDC GLUCOMTR-MCNC: 142 MG/DL — HIGH (ref 70–99)
GLUCOSE SERPL-MCNC: 109 MG/DL — HIGH (ref 70–99)
HCT VFR BLD CALC: 20.3 % — CRITICAL LOW (ref 39–50)
HCT VFR BLD CALC: 25.2 % — LOW (ref 39–50)
HGB BLD-MCNC: 6.2 G/DL — CRITICAL LOW (ref 13–17)
HGB BLD-MCNC: 8 G/DL — LOW (ref 13–17)
KAPPA/LAMBDA FREE LIGHT CHAIN RATIO, SERUM: SIGNIFICANT CHANGE UP
MCHC RBC-ENTMCNC: 26.5 PG — LOW (ref 27–34)
MCHC RBC-ENTMCNC: 27 PG — SIGNIFICANT CHANGE UP (ref 27–34)
MCHC RBC-ENTMCNC: 30.5 % — LOW (ref 32–36)
MCHC RBC-ENTMCNC: 31.7 % — LOW (ref 32–36)
MCV RBC AUTO: 85.1 FL — SIGNIFICANT CHANGE UP (ref 80–100)
MCV RBC AUTO: 86.8 FL — SIGNIFICANT CHANGE UP (ref 80–100)
NRBC # FLD: 0 — SIGNIFICANT CHANGE UP
NRBC # FLD: 0 — SIGNIFICANT CHANGE UP
NT-PROBNP SERPL-SCNC: SIGNIFICANT CHANGE UP PG/ML
PLATELET # BLD AUTO: 362 K/UL — SIGNIFICANT CHANGE UP (ref 150–400)
PLATELET # BLD AUTO: 371 K/UL — SIGNIFICANT CHANGE UP (ref 150–400)
PMV BLD: 10.9 FL — SIGNIFICANT CHANGE UP (ref 7–13)
PMV BLD: 11.7 FL — SIGNIFICANT CHANGE UP (ref 7–13)
POTASSIUM SERPL-MCNC: SIGNIFICANT CHANGE UP MMOL/L (ref 3.5–5.3)
POTASSIUM SERPL-SCNC: SIGNIFICANT CHANGE UP MMOL/L (ref 3.5–5.3)
RBC # BLD: 2.34 M/UL — LOW (ref 4.2–5.8)
RBC # BLD: 2.96 M/UL — LOW (ref 4.2–5.8)
RBC # FLD: 14.3 % — SIGNIFICANT CHANGE UP (ref 10.3–14.5)
RBC # FLD: 14.4 % — SIGNIFICANT CHANGE UP (ref 10.3–14.5)
SODIUM SERPL-SCNC: 143 MMOL/L — SIGNIFICANT CHANGE UP (ref 135–145)
WBC # BLD: 7.88 K/UL — SIGNIFICANT CHANGE UP (ref 3.8–10.5)
WBC # BLD: 8.81 K/UL — SIGNIFICANT CHANGE UP (ref 3.8–10.5)
WBC # FLD AUTO: 7.88 K/UL — SIGNIFICANT CHANGE UP (ref 3.8–10.5)
WBC # FLD AUTO: 8.81 K/UL — SIGNIFICANT CHANGE UP (ref 3.8–10.5)

## 2018-02-27 PROCEDURE — 71045 X-RAY EXAM CHEST 1 VIEW: CPT | Mod: 26

## 2018-02-27 PROCEDURE — 99233 SBSQ HOSP IP/OBS HIGH 50: CPT | Mod: GC

## 2018-02-27 PROCEDURE — 99232 SBSQ HOSP IP/OBS MODERATE 35: CPT

## 2018-02-27 PROCEDURE — 99232 SBSQ HOSP IP/OBS MODERATE 35: CPT | Mod: GC

## 2018-02-27 RX ORDER — FUROSEMIDE 40 MG
60 TABLET ORAL ONCE
Refills: 0 | Status: COMPLETED | OUTPATIENT
Start: 2018-02-27 | End: 2018-02-27

## 2018-02-27 RX ADMIN — Medication 1300 MILLIGRAM(S): at 13:15

## 2018-02-27 RX ADMIN — Medication 1300 MILLIGRAM(S): at 21:38

## 2018-02-27 RX ADMIN — Medication 1300 MILLIGRAM(S): at 06:17

## 2018-02-27 RX ADMIN — Medication 1 TABLET(S): at 13:15

## 2018-02-27 RX ADMIN — ATORVASTATIN CALCIUM 40 MILLIGRAM(S): 80 TABLET, FILM COATED ORAL at 21:38

## 2018-02-27 RX ADMIN — Medication 300 MILLIGRAM(S): at 13:15

## 2018-02-27 RX ADMIN — Medication 30 MILLIGRAM(S): at 06:17

## 2018-02-27 RX ADMIN — Medication 1 APPLICATION(S): at 10:41

## 2018-02-27 RX ADMIN — PREGABALIN 1000 MICROGRAM(S): 225 CAPSULE ORAL at 13:15

## 2018-02-27 RX ADMIN — Medication 60 MILLIGRAM(S): at 13:53

## 2018-02-27 NOTE — PROGRESS NOTE ADULT - PROBLEM SELECTOR PLAN 1
Pt. with JANY on CKD in the setting of infection and diarrhea. CKD in the setting of long-standing DM. Scr was 1.5 in 3/2017, increased to 2.30 in 7/2017. Scr on admission (2/12) was elevated at 3.81, which peaked to 6.3 on 2/23 and decreased to 5.12 today. Pt. with likely ATN. Monitor BMP and urine output. Avoid any potential nephrotoxins. Pt. at increased risk for radiocontrast nephropathy

## 2018-02-27 NOTE — PROGRESS NOTE ADULT - SUBJECTIVE AND OBJECTIVE BOX
Ramonita Lewis MD PGY-2  Pager [phone]  Patient is a 63y old  Male who presents with a chief complaint of 63M PMH DM, HTN, CKD p/w L foot pain x 3 days. (19 Feb 2018 10:39)      INTERVAL HPI/OVERNIGHT EVENTS:  Pt noted to be hypoxic to mid 80s overnight, but asymptomatic at the time. Pt was placed on NC O2 with improvement in oxygenation. He denies chest pain, shortness of breath, cough, orthopnea. Reports that his LE edema continues to improve. Denies abd pain, n/v/d    REVIEW OF SYSTEMS:  Constitutional:     [ ] negative [ ] fevers [ ] chills [ ] weight loss [ ] weight gain  HEENT:                  [ ] negative [ ] dry eyes [ ] eye irritation [ ] postnasal drip [ ] nasal congestion  CV:                         [ ] negative  [ ] chest pain [ ] orthopnea [ ] palpitations [ ] murmur  Resp:                     [ ] negative [ ] cough [ ] shortness of breath [ ] dyspnea [ ] wheezing [ ] sputum [ ] hemoptysis  GI:                          [ ] negative [ ] nausea [ ] vomiting [ ] diarrhea [ ] constipation [ ] abd pain [ ] dysphagia   :                        [ ] negative [ ] dysuria [ ] nocturia [ ] hematuria [ ] increased urinary frequency  Musculoskeletal: [ ] negative [ ] back pain [ ] myalgias [ ] arthralgias [ ] fracture  Skin:                       [ ] negative [ ] rash [ ] itch  Neurological:        [ ] negative [ ] headache [ ] dizziness [ ] syncope [ ] weakness [ ] numbness  Psychiatric:           [ ] negative [ ] anxiety [ ] depression  Endocrine:            [ ] negative [ ] diabetes [ ] thyroid problem  Heme/Lymph:      [ ] negative [ ] anemia [ ] bleeding problem  Allergic/Immune: [ ] negative [ ] itchy eyes [ ] nasal discharge [ ] hives [ ] angioedema    [x] All other systems negative  [ ] Unable to assess ROS because pt intubated and sedated.    Vital Signs Last 24 Hrs  T(C): 36.8 (27 Feb 2018 05:16), Max: 36.9 (26 Feb 2018 21:18)  T(F): 98.3 (27 Feb 2018 05:16), Max: 98.4 (26 Feb 2018 21:18)  HR: 60 (27 Feb 2018 05:16) (60 - 65)  BP: 143/62 (27 Feb 2018 05:16) (142/65 - 143/62)  BP(mean): --  RR: 18 (27 Feb 2018 05:16) (18 - 18)  SpO2: 96% (27 Feb 2018 05:16) (92% - 96%)  I&O's Summary    26 Feb 2018 07:01  -  27 Feb 2018 07:00  --------------------------------------------------------  IN: 0 mL / OUT: 1500 mL / NET: -1500 mL        PHYSICAL EXAM:  General: WN/WD NAD  HEENT: PERRLA, EOMI, moist mucous membranes  Neurology: A&Ox3, nonfocal, ARIAS x 4  Respiratory: diffusely diminished breath sounds, normal respiratory effort, no wheezes, crackles, rales  CV: RRR, S1S2, no murmurs, rubs or gallops  Abdominal: Soft, NT, ND +BS, Last BM  Extremities: No edema, s/p right AKA, LLE 2+ pitting edema, plantar toe wound c/d/i      LABS:                        6.2    8.81  )-----------( 362      ( 27 Feb 2018 06:30 )             20.3     Hb Trend: 6.2<--, 7.4<--, 7.6<--, 7.7<--, 8.0<--  WBC Trend: 8.81<--, 8.56<--, 8.22<--  Plt Trend: 362<--, 344<--, 344<--, 303<--, 317<--          02-27    143  |  107  |  78<H>  ----------------------------<  109<H>  Test not performed SPECIMEN GROSSLY HEMOLYZED   |  22  |  5.12<H>    Ca    8.0<L>      27 Feb 2018 06:30            CAPILLARY BLOOD GLUCOSE      POCT Blood Glucose.: 105 mg/dL (27 Feb 2018 08:23)  POCT Blood Glucose.: 203 mg/dL (26 Feb 2018 22:36)  POCT Blood Glucose.: 117 mg/dL (26 Feb 2018 17:27)  POCT Blood Glucose.: 127 mg/dL (26 Feb 2018 12:03)              RADIOLOGY & ADDITIONAL TESTS:    Imaging Personally Reviewed:  [ ] YES  [ ] NO [ ] No New Imaging Last 24hrs    Consultant(s) Notes Reviewed:  [x ] YES  [ ] NO    Care Discussed with Consultants/Other Providers [ x] YES  [ ] NO

## 2018-02-27 NOTE — PROGRESS NOTE ADULT - ASSESSMENT
62 y/o male with PMH of HTN, DM, right BKA and CKD admitted with left foot infection. Pt. now with JANY.

## 2018-02-27 NOTE — PROGRESS NOTE ADULT - PROBLEM SELECTOR PLAN 3
-Hgb 6.2 this morning, will recheck today  -Retic count normal. Ferritin high. Likely 2/2 in setting of decreased EPO production d/t worsening kidney function. Hemolysis less likely. T bili is WNL. Acute blood loss also less likely as no source.

## 2018-02-27 NOTE — PROGRESS NOTE ADULT - SUBJECTIVE AND OBJECTIVE BOX
VASCULAR SURGERY NOTE        PHYSICAL EXAM  Vital Signs Last 24 Hrs  T(C): 36.8 (27 Feb 2018 13:11), Max: 36.9 (26 Feb 2018 21:18)  T(F): 98.3 (27 Feb 2018 13:11), Max: 98.4 (26 Feb 2018 21:18)  HR: 67 (27 Feb 2018 13:11) (60 - 67)  BP: 157/59 (27 Feb 2018 13:11) (143/53 - 157/59)  RR: 19 (27 Feb 2018 13:11) (18 - 19)  SpO2: 90% (27 Feb 2018 13:11) (90% - 96%)    I&O's Summary    26 Feb 2018 07:01  -  27 Feb 2018 07:00  --------------------------------------------------------  IN: 0 mL / OUT: 1500 mL / NET: -1500 mL    27 Feb 2018 07:01  -  27 Feb 2018 19:01  --------------------------------------------------------  IN: 0 mL / OUT: 400 mL / NET: -400 mL        Physical Exam:  Neuro  A&Ox3 VSS  Vascular:  lt toe wound stable w limited healing   Musculoskeletal: wnl        LABS:                        8.0    7.88  )-----------( 371      ( 27 Feb 2018 09:33 )             25.2     02-27    143  |  107  |  78<H>  ----------------------------<  109<H>  Test not performed SPECIMEN GROSSLY HEMOLYZED   |  22  |  5.12<H>    Ca    8.0<L>      27 Feb 2018 06:30      Lactate: VASCULAR SURGERY NOTE      Pt w/o new c/o    Vital Signs Last 24 Hrs  T(C): 36.8 (27 Feb 2018 13:11), Max: 36.9 (26 Feb 2018 21:18)  T(F): 98.3 (27 Feb 2018 13:11), Max: 98.4 (26 Feb 2018 21:18)  HR: 67 (27 Feb 2018 13:11) (60 - 67)  BP: 157/59 (27 Feb 2018 13:11) (143/53 - 157/59)  RR: 19 (27 Feb 2018 13:11) (18 - 19)  SpO2: 90% (27 Feb 2018 13:11) (90% - 96%)    I&O's Summary    26 Feb 2018 07:01  -  27 Feb 2018 07:00  --------------------------------------------------------  IN: 0 mL / OUT: 1500 mL / NET: -1500 mL    27 Feb 2018 07:01  -  27 Feb 2018 19:01  --------------------------------------------------------  IN: 0 mL / OUT: 400 mL / NET: -400 mL        Physical Exam:  Neuro  A&Ox3 VSS  Vascular:  lt toe wound stable w limited healing   Musculoskeletal: wnl        LABS:                        8.0    7.88  )-----------( 371      ( 27 Feb 2018 09:33 )             25.2     02-27    143  |  107  |  78<H>  ----------------------------<  109<H>  Test not performed SPECIMEN GROSSLY HEMOLYZED   |  22  |  5.12<H>    Ca    8.0<L>      27 Feb 2018 06:30

## 2018-02-27 NOTE — PROGRESS NOTE ADULT - ASSESSMENT
62yo M with DM and R BKA, now presenting with Diabetic foot soft tissue infection.       - Wound care as per podiatry  - will follow up Renal for optimal time for angio    MERCEDEZ Ba MD PGYII [id number] 64yo M with DM and R BKA, now presenting with Diabetic foot soft tissue infection.       - Wound care as per podiatry  -  stable vasc exam for now  please reconsult when pt is cleared for le angio  f/u as outpt prn

## 2018-02-27 NOTE — PROGRESS NOTE ADULT - PROBLEM SELECTOR PLAN 2
Pt with asymptomatic hypoxia overnight which improved with NC, and now resolved off supplemental O2  - CXR showing worsening b/l pleural effusions  - check pro-BNP, TTE to evaluate for heart failure  - will check ABG if pt hypoxic again

## 2018-02-27 NOTE — PROGRESS NOTE ADULT - SUBJECTIVE AND OBJECTIVE BOX
[facility] DIVISION OF KIDNEY DISEASES AND HYPERTENSION -- FOLLOW UP NOTE  --------------------------------------------------------------------------------  HPI: 63-year-old male with PMH of HTN, DM, right BKA, and CKD admitted for left foot infection. As per review of labs on [place]/[employer], Scr was 1.51 in 3/2017. As per PMD's office, Scr checked in 7/2017 was 2.30. Labs on admission (2/12) showed elevated Scr of 3.8 which peaked to 6.3 on 2/23 and decreased to 5.12 today. Pt. currently resting in bed and denies SOB, CP, or N/V.       PAST HISTORY  --------------------------------------------------------------------------------  No significant changes to PMH, PSH, FHx, SHx, unless otherwise noted    ALLERGIES & MEDICATIONS  --------------------------------------------------------------------------------  Allergies    No Known Allergies    Intolerances      Standing Inpatient Medications  atorvastatin 40 milliGRAM(s) Oral at bedtime  cyanocobalamin 1000 MICROGram(s) Oral daily  dextrose 5%. 1000 milliLiter(s) IV Continuous <Continuous>  dextrose 50% Injectable 12.5 Gram(s) IV Push once  dextrose 50% Injectable 25 Gram(s) IV Push once  dextrose 50% Injectable 25 Gram(s) IV Push once  influenza   Vaccine 0.5 milliLiter(s) IntraMuscular once  insulin lispro (HumaLOG) corrective regimen sliding scale   SubCutaneous three times a day before meals  labetalol 300 milliGRAM(s) Oral three times a day  lactobacillus acidophilus 1 Tablet(s) Oral daily  NIFEdipine XL 30 milliGRAM(s) Oral daily  silver sulfADIAZINE 1% Cream 1 Application(s) Topical daily  sodium bicarbonate 1300 milliGRAM(s) Oral three times a day        REVIEW OF SYSTEMS  --------------------------------------------------------------------------------  General: no fever  CVS: no chest pain  RESP: no SOB  ABD: no abdominal pain  : no dysuria  MSK: Left leg edema/foot infection      VITALS/PHYSICAL EXAM  --------------------------------------------------------------------------------  T(C): 36.8 (02-27-18 @ 05:16), Max: 36.9 (02-26-18 @ 21:18)  HR: 60 (02-27-18 @ 05:16) (60 - 65)  BP: 143/62 (02-27-18 @ 05:16) (142/65 - 143/62)  RR: 18 (02-27-18 @ 05:16) (18 - 18)  SpO2: 96% (02-27-18 @ 05:16) (92% - 96%)  Wt(kg): --        02-26-18 @ 07:01  -  02-27-18 @ 07:00  --------------------------------------------------------  IN: 0 mL / OUT: 1500 mL / NET: -1500 mL      Physical Exam:  	Gen: Resting in bed   	HEENT: No JVD  	Pulm: CTA B/L  	CV: S1S2+  	Abd:  soft  	LE: Right BKA, LLE edema present, left foot dressing seen   	Neuro: Awake and alert   	Psych: Normal affect and mood  	Skin: Warm    LABS/STUDIES  --------------------------------------------------------------------------------              8.0    7.88  >-----------<  371      [02-27-18 @ 09:33]              25.2     143  |  107  |  78  ----------------------------<  109      [02-27-18 @ 06:30]        |  22  |  5.12        Ca     8.0     [02-27-18 @ 06:30]            Creatinine Trend:  SCr 5.12 [02-27 @ 06:30]  SCr 5.43 [02-26 @ 06:05]  SCr 5.77 [02-25 @ 06:15]  SCr 6.21 [02-24 @ 06:00]  SCr 6.30 [02-23 @ 07:30] [facility] DIVISION OF KIDNEY DISEASES AND HYPERTENSION -- FOLLOW UP NOTE  --------------------------------------------------------------------------------  HPI: 63-year-old male with PMH of HTN, DM, right BKA, and CKD admitted for left foot infection. As per review of labs on [place]/[employer], Scr was 1.51 in 3/2017. As per PMD's office, Scr checked in 7/2017 was 2.30. Labs on admission (2/12) showed elevated Scr of 3.8 which peaked to 6.3 on 2/23 and decreased to 5.12 today. Pt. currently resting in bed and denies SOB, CP, or N/V.     PAST HISTORY  --------------------------------------------------------------------------------  No significant changes to PMH, PSH, FHx, SHx, unless otherwise noted    ALLERGIES & MEDICATIONS  --------------------------------------------------------------------------------  Allergies    No Known Allergies    Standing Inpatient Medications  atorvastatin 40 milliGRAM(s) Oral at bedtime  cyanocobalamin 1000 MICROGram(s) Oral daily  dextrose 5%. 1000 milliLiter(s) IV Continuous <Continuous>  dextrose 50% Injectable 12.5 Gram(s) IV Push once  dextrose 50% Injectable 25 Gram(s) IV Push once  dextrose 50% Injectable 25 Gram(s) IV Push once  influenza   Vaccine 0.5 milliLiter(s) IntraMuscular once  insulin lispro (HumaLOG) corrective regimen sliding scale   SubCutaneous three times a day before meals  labetalol 300 milliGRAM(s) Oral three times a day  lactobacillus acidophilus 1 Tablet(s) Oral daily  NIFEdipine XL 30 milliGRAM(s) Oral daily  silver sulfADIAZINE 1% Cream 1 Application(s) Topical daily  sodium bicarbonate 1300 milliGRAM(s) Oral three times a day    REVIEW OF SYSTEMS  --------------------------------------------------------------------------------  General: no fever  CVS: no chest pain  RESP: no SOB  ABD: no abdominal pain  : no dysuria  MSK: Left leg edema/foot infection    VITALS/PHYSICAL EXAM  --------------------------------------------------------------------------------  T(C): 36.8 (02-27-18 @ 05:16), Max: 36.9 (02-26-18 @ 21:18)  HR: 60 (02-27-18 @ 05:16) (60 - 65)  BP: 143/62 (02-27-18 @ 05:16) (142/65 - 143/62)  RR: 18 (02-27-18 @ 05:16) (18 - 18)  SpO2: 96% (02-27-18 @ 05:16) (92% - 96%)  Wt(kg): --    02-26-18 @ 07:01  -  02-27-18 @ 07:00  --------------------------------------------------------  IN: 0 mL / OUT: 1500 mL / NET: -1500 mL    Physical Exam:  	Gen: Resting in bed   	HEENT: No JVD  	Pulm: CTA B/L  	CV: S1S2+  	Abd:  soft  	LE: Right BKA, LLE edema present, left foot dressing seen   	Neuro: Awake and alert   	Psych: Normal affect and mood  	Skin: Warm    LABS/STUDIES  --------------------------------------------------------------------------------              8.0    7.88  >-----------<  371      [02-27-18 @ 09:33]              25.2     143  |  107  |  78  ----------------------------<  109      [02-27-18 @ 06:30]        |  22  |  5.12        Ca     8.0     [02-27-18 @ 06:30]    Creatinine Trend:  SCr 5.12 [02-27 @ 06:30]  SCr 5.43 [02-26 @ 06:05]  SCr 5.77 [02-25 @ 06:15]  SCr 6.21 [02-24 @ 06:00]  SCr 6.30 [02-23 @ 07:30]

## 2018-02-27 NOTE — PROGRESS NOTE ADULT - ASSESSMENT
63M PMH DM on januvia, FS 80s at home, HTN, CKD, R BKA in 2009 presents with 3 day history of worsening foot pain concerning for cellulitis, currently stable. ATN is improving. Noted to have asymptomatic hypoxia, now resolved

## 2018-02-27 NOTE — PROGRESS NOTE ADULT - PROBLEM SELECTOR PLAN 2
Acidosis in the setting of JANY and infection. Serum CO2 improved to 22. Pt. on oral sodium bicarbonate 1300 mg TID. Monitor serum CO2

## 2018-02-27 NOTE — PROVIDER CONTACT NOTE (OTHER) - ASSESSMENT
patient is alert and oriented, in no apparent distress. Patient denies dyspnea, shortness of breath, or pain. Chest clear to auscultation. V/S otherwise stable.

## 2018-02-28 LAB
BUN SERPL-MCNC: 75 MG/DL — HIGH (ref 7–23)
CALCIUM SERPL-MCNC: 8.3 MG/DL — LOW (ref 8.4–10.5)
CHLORIDE SERPL-SCNC: 106 MMOL/L — SIGNIFICANT CHANGE UP (ref 98–107)
CO2 SERPL-SCNC: 23 MMOL/L — SIGNIFICANT CHANGE UP (ref 22–31)
CREAT SERPL-MCNC: 4.92 MG/DL — HIGH (ref 0.5–1.3)
GLUCOSE BLDC GLUCOMTR-MCNC: 130 MG/DL — HIGH (ref 70–99)
GLUCOSE BLDC GLUCOMTR-MCNC: 135 MG/DL — HIGH (ref 70–99)
GLUCOSE BLDC GLUCOMTR-MCNC: 135 MG/DL — HIGH (ref 70–99)
GLUCOSE BLDC GLUCOMTR-MCNC: 91 MG/DL — SIGNIFICANT CHANGE UP (ref 70–99)
GLUCOSE SERPL-MCNC: 90 MG/DL — SIGNIFICANT CHANGE UP (ref 70–99)
HCT VFR BLD CALC: 22.9 % — LOW (ref 39–50)
HGB BLD-MCNC: 7.2 G/DL — LOW (ref 13–17)
MAGNESIUM SERPL-MCNC: 2.2 MG/DL — SIGNIFICANT CHANGE UP (ref 1.6–2.6)
MCHC RBC-ENTMCNC: 27 PG — SIGNIFICANT CHANGE UP (ref 27–34)
MCHC RBC-ENTMCNC: 31.4 % — LOW (ref 32–36)
MCV RBC AUTO: 85.8 FL — SIGNIFICANT CHANGE UP (ref 80–100)
NRBC # FLD: 0 — SIGNIFICANT CHANGE UP
PHOSPHATE SERPL-MCNC: 5.4 MG/DL — HIGH (ref 2.5–4.5)
PLATELET # BLD AUTO: 341 K/UL — SIGNIFICANT CHANGE UP (ref 150–400)
PMV BLD: 11.3 FL — SIGNIFICANT CHANGE UP (ref 7–13)
POTASSIUM SERPL-MCNC: 4.7 MMOL/L — SIGNIFICANT CHANGE UP (ref 3.5–5.3)
POTASSIUM SERPL-SCNC: 4.7 MMOL/L — SIGNIFICANT CHANGE UP (ref 3.5–5.3)
RBC # BLD: 2.67 M/UL — LOW (ref 4.2–5.8)
RBC # FLD: 14.2 % — SIGNIFICANT CHANGE UP (ref 10.3–14.5)
SODIUM SERPL-SCNC: 144 MMOL/L — SIGNIFICANT CHANGE UP (ref 135–145)
WBC # BLD: 7.28 K/UL — SIGNIFICANT CHANGE UP (ref 3.8–10.5)
WBC # FLD AUTO: 7.28 K/UL — SIGNIFICANT CHANGE UP (ref 3.8–10.5)

## 2018-02-28 PROCEDURE — 99233 SBSQ HOSP IP/OBS HIGH 50: CPT | Mod: GC

## 2018-02-28 RX ADMIN — Medication 1 APPLICATION(S): at 13:13

## 2018-02-28 RX ADMIN — ATORVASTATIN CALCIUM 40 MILLIGRAM(S): 80 TABLET, FILM COATED ORAL at 22:32

## 2018-02-28 RX ADMIN — Medication 30 MILLIGRAM(S): at 05:47

## 2018-02-28 RX ADMIN — Medication 300 MILLIGRAM(S): at 22:32

## 2018-02-28 RX ADMIN — Medication 300 MILLIGRAM(S): at 13:13

## 2018-02-28 RX ADMIN — Medication 1300 MILLIGRAM(S): at 22:32

## 2018-02-28 RX ADMIN — Medication 1300 MILLIGRAM(S): at 13:13

## 2018-02-28 RX ADMIN — Medication 300 MILLIGRAM(S): at 05:47

## 2018-02-28 RX ADMIN — Medication 1300 MILLIGRAM(S): at 05:47

## 2018-02-28 RX ADMIN — Medication 1 TABLET(S): at 13:13

## 2018-02-28 RX ADMIN — PREGABALIN 1000 MICROGRAM(S): 225 CAPSULE ORAL at 13:13

## 2018-02-28 NOTE — PROGRESS NOTE ADULT - PROBLEM SELECTOR PLAN 1
-ATN on CKD stage III in the setting of sepsis. Creatinine 4.92 today   -Patient still urinating, but very little.  Continuing to monitor urine outputs.  -Pt is still fluid overloaded and If becomes SOB, will give 80 Lasix IV and consider bipap.  -Renal consult appreciated. Renal u/s negative. Spun down urine demonstrating granular casts suggesting ATN. Baseline creatinine is 2.3.   -Avoiding nephrotoxins and RCA.  -Sodium bicarb TID with improving bicarb.

## 2018-02-28 NOTE — PROGRESS NOTE ADULT - PROBLEM SELECTOR PLAN 7
-Heparin subq  -Dispo: As creatinine continues to improve, will schedule angiogram and recall vascular. Currently off of antibiotics -- will continue to monitor. Expect autodiuresis in setting of SAM Gerardo  PGY1  [phone]

## 2018-02-28 NOTE — PROGRESS NOTE ADULT - ATTENDING COMMENTS
comfortable now but did notice some SOB last night.  exam- dull bases R>L.  creat lower.    imp- cellulitis->SS/SS-> ATN. course c/b CHF- most likely diastolic HF with fluid overload from the ATN. systolic still possible.  plan- echo, if 02 sat dips -> lasix.  angio when renal fxn recovers

## 2018-02-28 NOTE — PROGRESS NOTE ADULT - PROBLEM SELECTOR PLAN 2
- likely 2/2 volume overload, now off of supplemental O2   - CXR showing worsening b/l pleural effusions  - [place] of 83739 on this admission, pending TTE   - will check ABG if pt hypoxic again

## 2018-02-28 NOTE — PROGRESS NOTE ADULT - PROBLEM SELECTOR PLAN 3
-Hgb of 7.2, normocytic in nature, likely 2/2 in setting of decreased EPO production d/t worsening kidney function. Hemolysis less likely. T bili is WNL. Acute blood loss also less likely as no source.  - will continue to monitor

## 2018-02-28 NOTE — CHART NOTE - NSCHARTNOTEFT_GEN_A_CORE
NUTRITION FOLLOW-UP:  Pt. observed w 100% consumption of breakfast.  He endorses good appetite & denies nausea/vomiting/diarrhea/constipation, or issues with chewing/swallowing @ this time.  Reviewed therapeutic diet modifications. As previously suggested, would add Prosource & Multivitamin supplements to aid with wound healing.  Possible weight increase (~3kg), although this is questionable.        Weight:  169.7lbs [77.1kg] on 2/28/18 (obtained by RDN via bed scale)                            [74.4kg] on 2/18/18.    Edema: None noted.    Skin:  Left foot cellulitis.      Pertinent Medications: MEDICATIONS  (STANDING):  atorvastatin 40 milliGRAM(s) Oral at bedtime  cyanocobalamin 1000 MICROGram(s) Oral daily  dextrose 5%. 1000 milliLiter(s) (50 mL/Hr) IV Continuous <Continuous>  dextrose 50% Injectable 12.5 Gram(s) IV Push once  dextrose 50% Injectable 25 Gram(s) IV Push once  dextrose 50% Injectable 25 Gram(s) IV Push once  influenza   Vaccine 0.5 milliLiter(s) IntraMuscular once  insulin lispro (HumaLOG) corrective regimen sliding scale   SubCutaneous three times a day before meals  labetalol 300 milliGRAM(s) Oral three times a day  lactobacillus acidophilus 1 Tablet(s) Oral daily  NIFEdipine XL 30 milliGRAM(s) Oral daily  silver sulfADIAZINE 1% Cream 1 Application(s) Topical daily  sodium bicarbonate 1300 milliGRAM(s) Oral three times a day    MEDICATIONS  (PRN):  acetaminophen   Tablet 650 milliGRAM(s) Oral every 6 hours PRN For Temp greater than 38 C (100.4 F)  acetaminophen   Tablet. 650 milliGRAM(s) Oral every 6 hours PRN Moderate Pain (4 - 6)  dextrose Gel 1 Dose(s) Oral once PRN Blood Glucose LESS THAN 70 milliGRAM(s)/deciliter  glucagon  Injectable 1 milliGRAM(s) IntraMuscular once PRN Glucose LESS THAN 70 milligrams/deciliter      Pertinent Labs:  02-28 Na144 mmol/L Glu 90 mg/dL K+ 4.7 mmol/L Cr  4.92 mg/dL<H> BUN 75 mg/dL<H> Phos 5.4 mg/dL<H> Alb n/a   PAB n/a       CAPILLARY BLOOD GLUCOSE      POCT Blood Glucose.: 91 mg/dL (28 Feb 2018 08:28)  POCT Blood Glucose.: 141 mg/dL (27 Feb 2018 22:17)  POCT Blood Glucose.: 142 mg/dL (27 Feb 2018 17:08)  POCT Blood Glucose.: 130 mg/dL (27 Feb 2018 12:03)        Current Diet:  Consistent carbohydrate, Low Sodium, Potassium & Phosphorus restricted.            PLAN/RECOMMENDATIONS:    1) Add Prosource 1 packet (30mL) PO daily to current diet.  2) Obtain daily weights  3) Add Multivitamin    4) RDN remains available and will f/u PRN.          Indy Matthew RDN, CDN pager [phone]

## 2018-02-28 NOTE — PROGRESS NOTE ADULT - SUBJECTIVE AND OBJECTIVE BOX
Patient is a 63y old  Male who presents with a chief complaint of 63M PMH DM, HTN, CKD p/w L foot pain x 3 days. (19 Feb 2018 10:39)      Vascular Surgery Attending Progress Note    Interval HPI:  pt states toe is feeling better     Medications:  acetaminophen   Tablet 650 milliGRAM(s) Oral every 6 hours PRN  acetaminophen   Tablet. 650 milliGRAM(s) Oral every 6 hours PRN  atorvastatin 40 milliGRAM(s) Oral at bedtime  cyanocobalamin 1000 MICROGram(s) Oral daily  dextrose 5%. 1000 milliLiter(s) IV Continuous <Continuous>  dextrose 50% Injectable 12.5 Gram(s) IV Push once  dextrose 50% Injectable 25 Gram(s) IV Push once  dextrose 50% Injectable 25 Gram(s) IV Push once  dextrose Gel 1 Dose(s) Oral once PRN  glucagon  Injectable 1 milliGRAM(s) IntraMuscular once PRN  influenza   Vaccine 0.5 milliLiter(s) IntraMuscular once  insulin lispro (HumaLOG) corrective regimen sliding scale   SubCutaneous three times a day before meals  labetalol 300 milliGRAM(s) Oral three times a day  lactobacillus acidophilus 1 Tablet(s) Oral daily  multivitamin 1 Tablet(s) Oral daily  NIFEdipine XL 30 milliGRAM(s) Oral daily  silver sulfADIAZINE 1% Cream 1 Application(s) Topical daily  sodium bicarbonate 1300 milliGRAM(s) Oral three times a day      Vital Signs Last 24 Hrs  T(C): 37.1 (28 Feb 2018 13:11), Max: 37.1 (28 Feb 2018 13:11)  T(F): 98.7 (28 Feb 2018 13:11), Max: 98.7 (28 Feb 2018 13:11)  HR: 66 (28 Feb 2018 13:11) (66 - 71)  BP: 151/52 (28 Feb 2018 13:11) (151/52 - 162/72)  BP(mean): --  RR: 18 (28 Feb 2018 13:11) (18 - 18)  SpO2: 90% (28 Feb 2018 13:11) (90% - 93%)  I&O's Summary    27 Feb 2018 07:01  -  28 Feb 2018 07:00  --------------------------------------------------------  IN: 0 mL / OUT: 600 mL / NET: -600 mL    28 Feb 2018 07:01  -  28 Feb 2018 21:00  --------------------------------------------------------  IN: 900 mL / OUT: 250 mL / NET: 650 mL        Physical Exam:  Neuro  A&Ox3 VSS  Vascular:  lt toe wound stable w eschar no draiange   Musculoskeletal: wnl     LABS:                        7.2    7.28  )-----------( 341      ( 28 Feb 2018 05:30 )             22.9     02-28    144  |  106  |  75<H>  ----------------------------<  90  4.7   |  23  |  4.92<H>    Ca    8.3<L>      28 Feb 2018 05:30  Phos  5.4     02-28  Mg     2.2     02-28          BARI NICOLAS MD  [phone]

## 2018-02-28 NOTE — PROGRESS NOTE ADULT - SUBJECTIVE AND OBJECTIVE BOX
Patient is a 63y old  Male who presents with a chief complaint of 63M PMH DM, HTN, CKD p/w L foot pain x 3 days. (19 Feb 2018 10:39)    SUBJECTIVE / OVERNIGHT EVENTS:  Patient seen at bedside --  no acute issues or concerns.     MEDICATIONS  (STANDING):  atorvastatin 40 milliGRAM(s) Oral at bedtime  cyanocobalamin 1000 MICROGram(s) Oral daily  dextrose 5%. 1000 milliLiter(s) (50 mL/Hr) IV Continuous <Continuous>  dextrose 50% Injectable 12.5 Gram(s) IV Push once  dextrose 50% Injectable 25 Gram(s) IV Push once  dextrose 50% Injectable 25 Gram(s) IV Push once  influenza   Vaccine 0.5 milliLiter(s) IntraMuscular once  insulin lispro (HumaLOG) corrective regimen sliding scale   SubCutaneous three times a day before meals  labetalol 300 milliGRAM(s) Oral three times a day  lactobacillus acidophilus 1 Tablet(s) Oral daily  NIFEdipine XL 30 milliGRAM(s) Oral daily  silver sulfADIAZINE 1% Cream 1 Application(s) Topical daily  sodium bicarbonate 1300 milliGRAM(s) Oral three times a day    MEDICATIONS  (PRN):  acetaminophen   Tablet 650 milliGRAM(s) Oral every 6 hours PRN For Temp greater than 38 C (100.4 F)  acetaminophen   Tablet. 650 milliGRAM(s) Oral every 6 hours PRN Moderate Pain (4 - 6)  dextrose Gel 1 Dose(s) Oral once PRN Blood Glucose LESS THAN 70 milliGRAM(s)/deciliter  glucagon  Injectable 1 milliGRAM(s) IntraMuscular once PRN Glucose LESS THAN 70 milligrams/deciliter    Vital Signs Last 24 Hrs  T(C): 37 (28 Feb 2018 04:57), Max: 37.2 (27 Feb 2018 21:00)  T(F): 98.6 (28 Feb 2018 04:57), Max: 98.9 (27 Feb 2018 21:00)  HR: 71 (28 Feb 2018 04:57) (61 - 71)  BP: 162/72 (28 Feb 2018 04:57) (146/61 - 162/72)  BP(mean): --  RR: 18 (28 Feb 2018 04:57) (18 - 19)  SpO2: 93% (28 Feb 2018 04:57) (90% - 93%)    CAPILLARY BLOOD GLUCOSE  POCT Blood Glucose.: 91 mg/dL (28 Feb 2018 08:28)  POCT Blood Glucose.: 141 mg/dL (27 Feb 2018 22:17)  POCT Blood Glucose.: 142 mg/dL (27 Feb 2018 17:08)  POCT Blood Glucose.: 130 mg/dL (27 Feb 2018 12:03)    I&O's Summary    27 Feb 2018 07:01  -  28 Feb 2018 07:00  --------------------------------------------------------  IN: 0 mL / OUT: 600 mL / NET: -600 mL    PHYSICAL EXAM  GENERAL: NAD, well-developed middle aged male  HEAD:  Atraumatic, Normocephalic  EYES: EOMI, PERRLA, conjunctiva and sclera clear  NECK: Supple, No JVD  CHEST/LUNG: On auscultation scattered crackles throughout. No wheezes or rhonchi   HEART: Regular rate and rhythm; Faint HIMA. no rubs, or gallops  ABDOMEN: Soft, Nontender, Nondistended; Bowel sounds present  EXTREMITIES:  Right sided BKA, On left side no cyanosis, 1+ pitting edema with left ankle wrapped. Markedly diminished sensation on LLE.   PSYCH: AAOx3  SKIN: No rashes or lesions    LABS:                        7.2    7.28  )-----------( 341      ( 28 Feb 2018 05:30 )             22.9     02-28    144  |  106  |  75<H>  ----------------------------<  90  4.7   |  23  |  4.92<H>    Ca    8.3<L>      28 Feb 2018 05:30  Phos  5.4     02-28  Mg     2.2     02-28    RADIOLOGY & ADDITIONAL TESTS:    Imaging Personally Reviewed:  Consultant(s) Notes Reviewed:    Care Discussed with Consultants/Other Providers:

## 2018-02-28 NOTE — PROGRESS NOTE ADULT - ASSESSMENT
62yo M with DM and R BKA, now presenting with Diabetic foot soft tissue infection.       - Wound care as per podiatry  - will follow up Renal for optimal time for angio  f/u as outpt to re eval   reconsult prn

## 2018-03-01 LAB
BUN SERPL-MCNC: 75 MG/DL — HIGH (ref 7–23)
CALCIUM SERPL-MCNC: 8.3 MG/DL — LOW (ref 8.4–10.5)
CHLORIDE SERPL-SCNC: 103 MMOL/L — SIGNIFICANT CHANGE UP (ref 98–107)
CO2 SERPL-SCNC: 24 MMOL/L — SIGNIFICANT CHANGE UP (ref 22–31)
CREAT SERPL-MCNC: 4.77 MG/DL — HIGH (ref 0.5–1.3)
GAS PNL BLDMV: SIGNIFICANT CHANGE UP
GLUCOSE BLDC GLUCOMTR-MCNC: 104 MG/DL — HIGH (ref 70–99)
GLUCOSE BLDC GLUCOMTR-MCNC: 114 MG/DL — HIGH (ref 70–99)
GLUCOSE BLDC GLUCOMTR-MCNC: 125 MG/DL — HIGH (ref 70–99)
GLUCOSE BLDC GLUCOMTR-MCNC: 90 MG/DL — SIGNIFICANT CHANGE UP (ref 70–99)
GLUCOSE SERPL-MCNC: 87 MG/DL — SIGNIFICANT CHANGE UP (ref 70–99)
HCT VFR BLD CALC: 23.9 % — LOW (ref 39–50)
HGB BLD-MCNC: 7.6 G/DL — LOW (ref 13–17)
MAGNESIUM SERPL-MCNC: 2.2 MG/DL — SIGNIFICANT CHANGE UP (ref 1.6–2.6)
MCHC RBC-ENTMCNC: 27.2 PG — SIGNIFICANT CHANGE UP (ref 27–34)
MCHC RBC-ENTMCNC: 31.8 % — LOW (ref 32–36)
MCV RBC AUTO: 85.7 FL — SIGNIFICANT CHANGE UP (ref 80–100)
NRBC # FLD: 0 — SIGNIFICANT CHANGE UP
PHOSPHATE SERPL-MCNC: 5.3 MG/DL — HIGH (ref 2.5–4.5)
PLATELET # BLD AUTO: 348 K/UL — SIGNIFICANT CHANGE UP (ref 150–400)
PMV BLD: 11.1 FL — SIGNIFICANT CHANGE UP (ref 7–13)
POTASSIUM SERPL-MCNC: 4.8 MMOL/L — SIGNIFICANT CHANGE UP (ref 3.5–5.3)
POTASSIUM SERPL-SCNC: 4.8 MMOL/L — SIGNIFICANT CHANGE UP (ref 3.5–5.3)
RBC # BLD: 2.79 M/UL — LOW (ref 4.2–5.8)
RBC # FLD: 14.2 % — SIGNIFICANT CHANGE UP (ref 10.3–14.5)
SODIUM SERPL-SCNC: 143 MMOL/L — SIGNIFICANT CHANGE UP (ref 135–145)
WBC # BLD: 6.34 K/UL — SIGNIFICANT CHANGE UP (ref 3.8–10.5)
WBC # FLD AUTO: 6.34 K/UL — SIGNIFICANT CHANGE UP (ref 3.8–10.5)

## 2018-03-01 PROCEDURE — 99232 SBSQ HOSP IP/OBS MODERATE 35: CPT | Mod: GC

## 2018-03-01 PROCEDURE — 99233 SBSQ HOSP IP/OBS HIGH 50: CPT | Mod: GC

## 2018-03-01 RX ADMIN — Medication 1300 MILLIGRAM(S): at 06:17

## 2018-03-01 RX ADMIN — Medication 1 TABLET(S): at 12:28

## 2018-03-01 RX ADMIN — Medication 1 TABLET(S): at 12:26

## 2018-03-01 RX ADMIN — Medication 300 MILLIGRAM(S): at 21:46

## 2018-03-01 RX ADMIN — PREGABALIN 1000 MICROGRAM(S): 225 CAPSULE ORAL at 12:26

## 2018-03-01 RX ADMIN — Medication 1300 MILLIGRAM(S): at 14:25

## 2018-03-01 RX ADMIN — Medication 650 MILLIGRAM(S): at 18:29

## 2018-03-01 RX ADMIN — Medication 300 MILLIGRAM(S): at 14:25

## 2018-03-01 RX ADMIN — Medication 300 MILLIGRAM(S): at 06:17

## 2018-03-01 RX ADMIN — Medication 650 MILLIGRAM(S): at 17:26

## 2018-03-01 RX ADMIN — Medication 1300 MILLIGRAM(S): at 21:46

## 2018-03-01 RX ADMIN — ATORVASTATIN CALCIUM 40 MILLIGRAM(S): 80 TABLET, FILM COATED ORAL at 21:46

## 2018-03-01 RX ADMIN — Medication 1 APPLICATION(S): at 12:27

## 2018-03-01 NOTE — PROGRESS NOTE ADULT - PROBLEM SELECTOR PLAN 1
-ATN on CKD stage III in the setting of sepsis. Creatinine ___ today   -Patient still urinating.  Continuing to monitor urine outputs.  -Pt is still fluid overloaded and If becomes SOB, will give 80 Lasix IV and consider bipap.  -Renal consult appreciated. Renal u/s negative. Spun down urine demonstrating granular casts suggesting ATN. Baseline creatinine is 2.3.   -Avoiding nephrotoxins and RCA.  -Sodium bicarb TID with improving bicarb.  -As ATN improves, expect autodiuresis, which will also help with the fluid overload. -ATN on CKD stage III in the setting of sepsis. Improving and trending down.  -Patient still urinating, now reporting more.  Continuing to monitor urine outputs.  -Pt is still fluid overloaded and If becomes SOB, will give 80 Lasix IV and consider bipap. Fluid overload seems to be improving, however, as leg swelling decreased and lung exam sounds better. Patient may be approaching the auto-diuresing stage of his ATN.  -Renal consult appreciated. Renal u/s negative. Spun down urine demonstrating granular casts suggesting ATN. Baseline creatinine is 2.3.   -Avoiding nephrotoxins and RCA.  -Sodium bicarb TID with improving bicarb.

## 2018-03-01 NOTE — PROGRESS NOTE ADULT - PROBLEM SELECTOR PLAN 7
-Heparin subq  -Dispo: As creatinine continues to improve, will schedule angiogram and recall vascular. Currently off of antibiotics -- will continue to monitor.

## 2018-03-01 NOTE — PROGRESS NOTE ADULT - PROBLEM SELECTOR PLAN 2
Acidosis in the setting of JANY and infection. Serum CO2 improved to 24. Pt. on oral sodium bicarbonate therapy. Monitor serum CO2

## 2018-03-01 NOTE — PROGRESS NOTE ADULT - PROBLEM SELECTOR PLAN 4
-L foot with cellulitis with resulting sepsis which is now improved.  -ID is following; recs appreciated. S/p vancomycin and zosyn. Completed Unasyn on 2/22.  -Podiatry has evaluated patient and report good improvement. recs appreciated.   -Surgery has been consulted and are following. Want an angiogram; scheduling for when creatinine is stable. Vascular will d/w nephrology re: timing for angiogram.  -Wound culture growing Acinetobacter, corynebacterium, staph haemolyticus and staph aureus. Blood cx with NGTD.  -LLE xrays negative for free air; CT read negative for free air.

## 2018-03-01 NOTE — PROGRESS NOTE ADULT - SUBJECTIVE AND OBJECTIVE BOX
[facility] DIVISION OF KIDNEY DISEASES AND HYPERTENSION -- FOLLOW UP NOTE  --------------------------------------------------------------------------------  HPI: 63-year-old male with PMH of HTN, DM, right BKA, and CKD admitted for left foot infection. As per review of labs on [place]/[employer], Scr was 1.51 in 3/2017. As per PMD's office, Scr checked in 7/2017 was 2.30. Labs on admission (2/12) showed elevated Scr of 3.8 which peaked to 6.3 on 2/23 and decreased to 4.77 today. Pt. currently resting in bed and denies SOB, CP, or N/V.     PAST HISTORY  --------------------------------------------------------------------------------  No significant changes to PMH, PSH, FHx, SHx, unless otherwise noted    ALLERGIES & MEDICATIONS  --------------------------------------------------------------------------------  Allergies    No Known Allergies    Intolerances      Standing Inpatient Medications  atorvastatin 40 milliGRAM(s) Oral at bedtime  cyanocobalamin 1000 MICROGram(s) Oral daily  dextrose 5%. 1000 milliLiter(s) IV Continuous <Continuous>  dextrose 50% Injectable 12.5 Gram(s) IV Push once  dextrose 50% Injectable 25 Gram(s) IV Push once  dextrose 50% Injectable 25 Gram(s) IV Push once  influenza   Vaccine 0.5 milliLiter(s) IntraMuscular once  insulin lispro (HumaLOG) corrective regimen sliding scale   SubCutaneous three times a day before meals  labetalol 300 milliGRAM(s) Oral three times a day  lactobacillus acidophilus 1 Tablet(s) Oral daily  multivitamin 1 Tablet(s) Oral daily  NIFEdipine XL 30 milliGRAM(s) Oral daily  silver sulfADIAZINE 1% Cream 1 Application(s) Topical daily  sodium bicarbonate 1300 milliGRAM(s) Oral three times a day        REVIEW OF SYSTEMS  --------------------------------------------------------------------------------  General: no fever  CVS: no chest pain  RESP: no SOB  ABD: no abdominal pain  : no dysuria  MSK: Left leg edema/foot infection    VITALS/PHYSICAL EXAM  --------------------------------------------------------------------------------  T(C): 36.8 (03-01-18 @ 05:28), Max: 37.1 (02-28-18 @ 13:11)  HR: 66 (03-01-18 @ 05:28) (63 - 66)  BP: 152/64 (03-01-18 @ 05:28) (149/56 - 152/64)  RR: 18 (03-01-18 @ 05:28) (18 - 18)  SpO2: 93% (03-01-18 @ 05:28) (90% - 93%)  Wt(kg): --        02-28-18 @ 07:01  -  03-01-18 @ 07:00  --------------------------------------------------------  IN: 900 mL / OUT: 550 mL / NET: 350 mL      Physical Exam:  	Gen: Resting in bed   	HEENT: No JVD  	Pulm: CTA B/L  	CV: S1S2+  	Abd:  soft  	LE: Right BKA, LLE edema present, left foot dressing seen   	Neuro: Awake and alert   	Psych: Normal affect and mood  	Skin: Warm    LABS/STUDIES  --------------------------------------------------------------------------------              7.6    6.34  >-----------<  348      [03-01-18 @ 06:00]              23.9     143  |  103  |  75  ----------------------------<  87      [03-01-18 @ 06:00]  4.8   |  24  |  4.77        Ca     8.3     [03-01-18 @ 06:00]      Mg     2.2     [03-01-18 @ 06:00]      Phos  5.3     [03-01-18 @ 06:00]          Creatinine Trend:  SCr 4.77 [03-01 @ 06:00]  SCr 4.92 [02-28 @ 05:30]  SCr 5.12 [02-27 @ 06:30]  SCr 5.43 [02-26 @ 06:05]  SCr 5.77 [02-25 @ 06:15] [facility] DIVISION OF KIDNEY DISEASES AND HYPERTENSION -- FOLLOW UP NOTE  --------------------------------------------------------------------------------  HPI: 63-year-old male with PMH of HTN, DM, right BKA, and CKD admitted for left foot infection. As per review of labs on [place]/[employer], Scr was 1.51 in 3/2017. As per PMD's office, Scr checked in 7/2017 was 2.30. Labs on admission (2/12) showed elevated Scr of 3.8 which peaked to 6.3 on 2/23 and decreased to 4.77 today. Pt. currently resting in bed and denies SOB, CP, or N/V.     PAST HISTORY  --------------------------------------------------------------------------------  No significant changes to PMH, PSH, FHx, SHx, unless otherwise noted    ALLERGIES & MEDICATIONS  --------------------------------------------------------------------------------  Allergies    No Known Allergies    Standing Inpatient Medications  atorvastatin 40 milliGRAM(s) Oral at bedtime  cyanocobalamin 1000 MICROGram(s) Oral daily  dextrose 5%. 1000 milliLiter(s) IV Continuous <Continuous>  dextrose 50% Injectable 12.5 Gram(s) IV Push once  dextrose 50% Injectable 25 Gram(s) IV Push once  dextrose 50% Injectable 25 Gram(s) IV Push once  influenza   Vaccine 0.5 milliLiter(s) IntraMuscular once  insulin lispro (HumaLOG) corrective regimen sliding scale   SubCutaneous three times a day before meals  labetalol 300 milliGRAM(s) Oral three times a day  lactobacillus acidophilus 1 Tablet(s) Oral daily  multivitamin 1 Tablet(s) Oral daily  NIFEdipine XL 30 milliGRAM(s) Oral daily  silver sulfADIAZINE 1% Cream 1 Application(s) Topical daily  sodium bicarbonate 1300 milliGRAM(s) Oral three times a day    REVIEW OF SYSTEMS  --------------------------------------------------------------------------------  General: no fever  CVS: no chest pain  RESP: no SOB  ABD: no abdominal pain  : no dysuria  MSK: Left leg edema/foot infection    VITALS/PHYSICAL EXAM  --------------------------------------------------------------------------------  T(C): 36.8 (03-01-18 @ 05:28), Max: 37.1 (02-28-18 @ 13:11)  HR: 66 (03-01-18 @ 05:28) (63 - 66)  BP: 152/64 (03-01-18 @ 05:28) (149/56 - 152/64)  RR: 18 (03-01-18 @ 05:28) (18 - 18)  SpO2: 93% (03-01-18 @ 05:28) (90% - 93%)  Wt(kg): --    02-28-18 @ 07:01  -  03-01-18 @ 07:00  --------------------------------------------------------  IN: 900 mL / OUT: 550 mL / NET: 350 mL    Physical Exam:  	Gen: Resting in bed   	HEENT: No JVD  	Pulm: CTA B/L  	CV: S1S2+  	Abd:  soft  	LE: Right BKA, LLE edema present, left foot dressing seen   	Neuro: Awake and alert   	Psych: Normal affect and mood  	Skin: Warm    LABS/STUDIES  --------------------------------------------------------------------------------              7.6    6.34  >-----------<  348      [03-01-18 @ 06:00]              23.9     143  |  103  |  75  ----------------------------<  87      [03-01-18 @ 06:00]  4.8   |  24  |  4.77        Ca     8.3     [03-01-18 @ 06:00]      Mg     2.2     [03-01-18 @ 06:00]      Phos  5.3     [03-01-18 @ 06:00]    Creatinine Trend:  SCr 4.77 [03-01 @ 06:00]  SCr 4.92 [02-28 @ 05:30]  SCr 5.12 [02-27 @ 06:30]  SCr 5.43 [02-26 @ 06:05]  SCr 5.77 [02-25 @ 06:15]

## 2018-03-01 NOTE — PROGRESS NOTE ADULT - PROBLEM SELECTOR PLAN 2
- likely 2/2 volume overload, now off of supplemental O2. Sat 90-93% overnight without symptoms.  - CXR showing worsening b/l pleural effusions  - [place] 35088 on this admission, pending TTE   - will check ABG if pt hypoxic again - likely 2/2 volume overload, now off of supplemental O2. Sat 90-93% overnight without symptoms.  - CXR showing worsening b/l pleural effusions on 2/27  - [place]NP of 56305 on this admission, pending TTE; should go today.  - will check ABG if pt hypoxic again

## 2018-03-01 NOTE — PROGRESS NOTE ADULT - PROBLEM SELECTOR PLAN 1
Pt. with JANY on CKD in the setting of infection and diarrhea. CKD in the setting of long-standing DM. Scr was 1.5 in 3/2017, increased to 2.30 in 7/2017. Scr on admission (2/12) was elevated at 3.81, which peaked to 6.3 on 2/23 and decreased to 4.77 today. Pt. with likely ATN. Monitor BMP and urine output. Avoid any potential nephrotoxins. Pt. at increased risk for radiocontrast nephropathy

## 2018-03-01 NOTE — PROGRESS NOTE ADULT - SUBJECTIVE AND OBJECTIVE BOX
Progress Note    JAMES PATRICK 63y (1955) Male [id number]  02-12-18 (17d)    DORON Thibodeaux PGY1  Pager# [phone]    Chief Complaint: 63M PMH DM, HTN, CKD p/w L foot pain x 3 days.    Subjective:  No acute events overnight. Patient seen and examined at bedside.    Review of Systems:  CONSTITUTIONAL: No fever, weight loss, or fatigue  EYES: No eye pain, visual disturbances, or discharge  ENMT:  No difficulty hearing, tinnitus, vertigo; No sinus or throat pain  NECK: No pain or stiffness  RESPIRATORY: No cough, wheezing, chills or hemoptysis; No shortness of breath  CARDIOVASCULAR: No chest pain, palpitations, dizziness, or leg swelling  GASTROINTESTINAL: No abdominal or epigastric pain. No nausea, vomiting, or hematemesis; No diarrhea or constipation. No melena or hematochezia.  GENITOURINARY: No dysuria, frequency, hematuria, or incontinence  NEUROLOGICAL: No headaches, memory loss, loss of strength, numbness, or tremors  SKIN: No itching, burning, rashes, or lesions   MUSCULOSKELETAL: No joint pain or swelling; No muscle, back, or extremity pain      PAST MEDICAL & SURGICAL HISTORY:  Kidney disease (N28.9)  HTN (hypertension) (I10)  DM (diabetes mellitus) (E11.9)  S/P BKA (below knee amputation) unilateral, right (Z89.511)  No significant past surgical history ([id number])    acetaminophen   Tablet 650 milliGRAM(s) Oral every 6 hours PRN  acetaminophen   Tablet. 650 milliGRAM(s) Oral every 6 hours PRN  atorvastatin 40 milliGRAM(s) Oral at bedtime  cyanocobalamin 1000 MICROGram(s) Oral daily  dextrose 5%. 1000 milliLiter(s) IV Continuous <Continuous>  dextrose 50% Injectable 12.5 Gram(s) IV Push once  dextrose 50% Injectable 25 Gram(s) IV Push once  dextrose 50% Injectable 25 Gram(s) IV Push once  dextrose Gel 1 Dose(s) Oral once PRN  glucagon  Injectable 1 milliGRAM(s) IntraMuscular once PRN  influenza   Vaccine 0.5 milliLiter(s) IntraMuscular once  insulin lispro (HumaLOG) corrective regimen sliding scale   SubCutaneous three times a day before meals  labetalol 300 milliGRAM(s) Oral three times a day  lactobacillus acidophilus 1 Tablet(s) Oral daily  multivitamin 1 Tablet(s) Oral daily  NIFEdipine XL 30 milliGRAM(s) Oral daily  silver sulfADIAZINE 1% Cream 1 Application(s) Topical daily  sodium bicarbonate 1300 milliGRAM(s) Oral three times a day    Objective:  T(C): 36.8 (03-01-18 @ 05:28), Max: 37.1 (02-28-18 @ 13:11)  HR: 66 (03-01-18 @ 05:28) (63 - 66)  BP: 152/64 (03-01-18 @ 05:28) (149/56 - 152/64)  RR: 18 (03-01-18 @ 05:28) (18 - 18)  SpO2: 93% (03-01-18 @ 05:28) (90% - 93%)    Physical exam:  GENERAL: NAD, well-developed  HEAD:  Atraumatic, Normocephalic  EYES: EOMI, PERRLA, conjunctiva and sclera clear  NECK: Supple, No JVD  CHEST/LUNG:   HEART: Regular rate and rhythm; No murmurs, rubs, or gallops  ABDOMEN: Soft, Nontender, Nondistended; Bowel sounds present  EXTREMITIES:    PSYCH: AAOx3  NEUROLOGY: non-focal  SKIN: No rashes or lesions      02-28-18 @ 07:01  -  03-01-18 @ 07:00  --------------------------------------------------------  IN: 900 mL / OUT: 550 mL / NET: 350 mL        CAPILLARY BLOOD GLUCOSE      (02-28 @ 05:30)                      7.2  7.28 )-----------( 341                 22.9    Neutrophils = -- (--%)  Lymphocytes = -- (--%)  Eosinophils = -- (--%)  Basophils = -- (--%)  Monocytes = -- (--%)  Bands = --%    02-28    144  |  106  |  75<H>  ----------------------------<  90  4.7   |  23  |  4.92<H>    Ca    8.3<L>      28 Feb 2018 05:30  Phos  5.4     02-28  Mg     2.2     02-28        WBC Trend: 7.28<--, 7.88<--, 8.81<--    Hb Trend: 7.2<--, 8.0<--, 6.2<--, 7.4<--, 7.6<--        New imaging in last 24 hours: none  Consult notes reviewed: vascular Progress Note    JAMES PATRICK 63y (1955) Male [id number]  02-12-18 (17d)    Dr. Galvez [facility] PGY1  Pager# [phone]    Chief Complaint: 63M PMH DM, HTN, CKD p/w L foot pain x 3 days.    Subjective:  No acute events overnight. Patient seen and examined at bedside. Patient has been doing better. Denies any shortness of breath. Reports that he has been urinating more than before. Leg swelling has improved. No more diarrhea.     Review of Systems:  CONSTITUTIONAL: No fever, weight loss, or fatigue  EYES: +decreased vision at baseline.  ENMT:  No difficulty hearing, tinnitus, vertigo; No sinus or throat pain  NECK: No pain or stiffness  RESPIRATORY: No cough, wheezing, chills or hemoptysis; No shortness of breath  CARDIOVASCULAR: No chest pain, palpitations, dizziness, or leg swelling  GASTROINTESTINAL: No abdominal or epigastric pain. No nausea, vomiting, or hematemesis; No diarrhea or constipation. No melena or hematochezia.  GENITOURINARY: No dysuria, frequency, hematuria, or incontinence  NEUROLOGICAL: No headaches, memory loss, loss of strength, numbness, or tremors  SKIN: +wound on foot.    MUSCULOSKELETAL: +swelling improvement of leg. +R BKA      PAST MEDICAL & SURGICAL HISTORY:  Kidney disease (N28.9)  HTN (hypertension) (I10)  DM (diabetes mellitus) (E11.9)  S/P BKA (below knee amputation) unilateral, right (Z89.511)  No significant past surgical history ([id number])    acetaminophen   Tablet 650 milliGRAM(s) Oral every 6 hours PRN  acetaminophen   Tablet. 650 milliGRAM(s) Oral every 6 hours PRN  atorvastatin 40 milliGRAM(s) Oral at bedtime  cyanocobalamin 1000 MICROGram(s) Oral daily  dextrose 5%. 1000 milliLiter(s) IV Continuous <Continuous>  dextrose 50% Injectable 12.5 Gram(s) IV Push once  dextrose 50% Injectable 25 Gram(s) IV Push once  dextrose 50% Injectable 25 Gram(s) IV Push once  dextrose Gel 1 Dose(s) Oral once PRN  glucagon  Injectable 1 milliGRAM(s) IntraMuscular once PRN  influenza   Vaccine 0.5 milliLiter(s) IntraMuscular once  insulin lispro (HumaLOG) corrective regimen sliding scale   SubCutaneous three times a day before meals  labetalol 300 milliGRAM(s) Oral three times a day  lactobacillus acidophilus 1 Tablet(s) Oral daily  multivitamin 1 Tablet(s) Oral daily  NIFEdipine XL 30 milliGRAM(s) Oral daily  silver sulfADIAZINE 1% Cream 1 Application(s) Topical daily  sodium bicarbonate 1300 milliGRAM(s) Oral three times a day    Objective:  T(C): 36.8 (03-01-18 @ 05:28), Max: 37.1 (02-28-18 @ 13:11)  HR: 66 (03-01-18 @ 05:28) (63 - 66)  BP: 152/64 (03-01-18 @ 05:28) (149/56 - 152/64)  RR: 18 (03-01-18 @ 05:28) (18 - 18)  SpO2: 93% (03-01-18 @ 05:28) (90% - 93%)    Physical exam:  GENERAL: NAD, well-developed  HEAD:  Atraumatic, Normocephalic  EYES: EOMI, PERRLA, conjunctiva and sclera clear  NECK: Supple, No JVD  CHEST/LUNG: Lungs sound clearer, still decreased at bases but now improved in upper and middle area of back b/l.  HEART: Regular rate and rhythm; No murmurs, rubs, or gallops  ABDOMEN: Soft, Nontender, Nondistended; Bowel sounds present  EXTREMITIES:  LLE: swelling markedly improved but still pitting. Sole of foot is improving. Less drainage. New skin growth. R BKA, area clean, no drainage or ulcerations noted.   PSYCH: AAOx3  NEUROLOGY: non-focal  SKIN: See extremities exam.      02-28-18 @ 07:01  -  03-01-18 @ 07:00  --------------------------------------------------------  IN: 900 mL / OUT: 550 mL / NET: 350 mL        CAPILLARY BLOOD GLUCOSE      (02-28 @ 05:30)                      7.2  7.28 )-----------( 341                 22.9    Neutrophils = -- (--%)  Lymphocytes = -- (--%)  Eosinophils = -- (--%)  Basophils = -- (--%)  Monocytes = -- (--%)  Bands = --%    02-28    144  |  106  |  75<H>  ----------------------------<  90  4.7   |  23  |  4.92<H>    Ca    8.3<L>      28 Feb 2018 05:30  Phos  5.4     02-28  Mg     2.2     02-28        WBC Trend: 7.28<--, 7.88<--, 8.81<--    Hb Trend: 7.2<--, 8.0<--, 6.2<--, 7.4<--, 7.6<--        New imaging in last 24 hours: none  Consult notes reviewed: vascular

## 2018-03-01 NOTE — PROGRESS NOTE ADULT - ASSESSMENT
62 y/o male with PMH of HTN, DM, right BKA and CKD admitted with left foot infection. Pt. now with resolving JANY.

## 2018-03-01 NOTE — PROGRESS NOTE ADULT - SUBJECTIVE AND OBJECTIVE BOX
Patient is a 63y old  Male who presents with a chief complaint of 63M PMH DM, HTN, CKD p/w L foot pain x 3 days. (19 Feb 2018 10:39)       INTERVAL HPI/OVERNIGHT EVENTS:  Patient seen and evaluated at bedside.  Pt is resting comfortable in NAD. Denies N/V/F/C.  Denies pain.    Allergies    No Known Allergies    Intolerances        Vital Signs Last 24 Hrs  T(C): 36.8 (01 Mar 2018 05:28), Max: 37.1 (28 Feb 2018 13:11)  T(F): 98.3 (01 Mar 2018 05:28), Max: 98.7 (28 Feb 2018 13:11)  HR: 66 (01 Mar 2018 05:28) (63 - 66)  BP: 152/64 (01 Mar 2018 05:28) (149/56 - 152/64)  BP(mean): --  RR: 18 (01 Mar 2018 05:28) (18 - 18)  SpO2: 93% (01 Mar 2018 05:28) (90% - 93%)    LABS:                        7.2    7.28  )-----------( 341      ( 28 Feb 2018 05:30 )             22.9     02-28    144  |  106  |  75<H>  ----------------------------<  90  4.7   |  23  |  4.92<H>    Ca    8.3<L>      28 Feb 2018 05:30  Phos  5.4     02-28  Mg     2.2     02-28          CAPILLARY BLOOD GLUCOSE      POCT Blood Glucose.: 135 mg/dL (28 Feb 2018 22:00)  POCT Blood Glucose.: 130 mg/dL (28 Feb 2018 17:15)  POCT Blood Glucose.: 135 mg/dL (28 Feb 2018 12:09)  POCT Blood Glucose.: 91 mg/dL (28 Feb 2018 08:28)      Lower Extremity Physical Exam:  Vasular: DP/PT 0/4, B/L, CFT <2 seconds B/L, Temperature gradient warm, B/L.   Neuro: Epicritic sensation absent to the level of toes, B/L.  Musculoskeletal/Ortho: RIGHT BKA.  Skin: Left foot deroofed blister with cherry red non-blanchable trophic changes to plantar aspect of toes 1-5, forefoot, midfoot, no ascending erythema or swelling, no malodor, no purulence, no deep probe, etiology unknown, ROM of toes intact, CFT to each toes normal, no sinus tract, no undermining. Overall appearance of foot improving.    RADIOLOGY & ADDITIONAL TESTS:

## 2018-03-01 NOTE — PROGRESS NOTE ADULT - ATTENDING COMMENTS
no SOB overnight.  still with dullness bilat about 1/4 up.    pod and renal input appreciated    imp- diabetic foot infection, PAD, CHF, ATN->JANY  plan- echo, may need to attempt diuresis in order to improve lung fxn, yet balance this against renal fxn

## 2018-03-01 NOTE — PROGRESS NOTE ADULT - ASSESSMENT
63M LEFT toes, forefoot, midfoot blister with cellulitis (improving with local care).    - Pt seen and evaluated  - Appearance of foot significantly improving.  - Cont IV abx  - Silvadene to LEFT foot wound daily  - No pod sx intervention at this time

## 2018-03-02 LAB
BUN SERPL-MCNC: 71 MG/DL — HIGH (ref 7–23)
CALCIUM SERPL-MCNC: 8.2 MG/DL — LOW (ref 8.4–10.5)
CHLORIDE SERPL-SCNC: 105 MMOL/L — SIGNIFICANT CHANGE UP (ref 98–107)
CO2 SERPL-SCNC: 22 MMOL/L — SIGNIFICANT CHANGE UP (ref 22–31)
CREAT SERPL-MCNC: 4.34 MG/DL — HIGH (ref 0.5–1.3)
GLUCOSE BLDC GLUCOMTR-MCNC: 108 MG/DL — HIGH (ref 70–99)
GLUCOSE BLDC GLUCOMTR-MCNC: 93 MG/DL — SIGNIFICANT CHANGE UP (ref 70–99)
GLUCOSE BLDC GLUCOMTR-MCNC: 95 MG/DL — SIGNIFICANT CHANGE UP (ref 70–99)
GLUCOSE BLDC GLUCOMTR-MCNC: 96 MG/DL — SIGNIFICANT CHANGE UP (ref 70–99)
GLUCOSE SERPL-MCNC: 76 MG/DL — SIGNIFICANT CHANGE UP (ref 70–99)
HCT VFR BLD CALC: 23.3 % — LOW (ref 39–50)
HGB BLD-MCNC: 7.5 G/DL — LOW (ref 13–17)
MCHC RBC-ENTMCNC: 28 PG — SIGNIFICANT CHANGE UP (ref 27–34)
MCHC RBC-ENTMCNC: 32.2 % — SIGNIFICANT CHANGE UP (ref 32–36)
MCV RBC AUTO: 86.9 FL — SIGNIFICANT CHANGE UP (ref 80–100)
NRBC # FLD: 0 — SIGNIFICANT CHANGE UP
PLATELET # BLD AUTO: 333 K/UL — SIGNIFICANT CHANGE UP (ref 150–400)
PMV BLD: 11.1 FL — SIGNIFICANT CHANGE UP (ref 7–13)
POTASSIUM SERPL-MCNC: 4.8 MMOL/L — SIGNIFICANT CHANGE UP (ref 3.5–5.3)
POTASSIUM SERPL-SCNC: 4.8 MMOL/L — SIGNIFICANT CHANGE UP (ref 3.5–5.3)
RBC # BLD: 2.68 M/UL — LOW (ref 4.2–5.8)
RBC # FLD: 14 % — SIGNIFICANT CHANGE UP (ref 10.3–14.5)
SODIUM SERPL-SCNC: 142 MMOL/L — SIGNIFICANT CHANGE UP (ref 135–145)
WBC # BLD: 5.7 K/UL — SIGNIFICANT CHANGE UP (ref 3.8–10.5)
WBC # FLD AUTO: 5.7 K/UL — SIGNIFICANT CHANGE UP (ref 3.8–10.5)

## 2018-03-02 PROCEDURE — 93306 TTE W/DOPPLER COMPLETE: CPT | Mod: 26

## 2018-03-02 PROCEDURE — 99232 SBSQ HOSP IP/OBS MODERATE 35: CPT | Mod: GC

## 2018-03-02 PROCEDURE — 99233 SBSQ HOSP IP/OBS HIGH 50: CPT | Mod: GC

## 2018-03-02 RX ORDER — NIFEDIPINE 30 MG
60 TABLET, EXTENDED RELEASE 24 HR ORAL DAILY
Refills: 0 | Status: DISCONTINUED | OUTPATIENT
Start: 2018-03-02 | End: 2018-03-16

## 2018-03-02 RX ADMIN — Medication 1 TABLET(S): at 13:11

## 2018-03-02 RX ADMIN — Medication 1300 MILLIGRAM(S): at 13:49

## 2018-03-02 RX ADMIN — PREGABALIN 1000 MICROGRAM(S): 225 CAPSULE ORAL at 13:11

## 2018-03-02 RX ADMIN — Medication 300 MILLIGRAM(S): at 13:49

## 2018-03-02 RX ADMIN — Medication 1300 MILLIGRAM(S): at 21:35

## 2018-03-02 RX ADMIN — Medication 30 MILLIGRAM(S): at 06:12

## 2018-03-02 RX ADMIN — Medication 1300 MILLIGRAM(S): at 06:12

## 2018-03-02 RX ADMIN — Medication 300 MILLIGRAM(S): at 21:35

## 2018-03-02 RX ADMIN — Medication 1 APPLICATION(S): at 13:11

## 2018-03-02 RX ADMIN — Medication 300 MILLIGRAM(S): at 06:12

## 2018-03-02 RX ADMIN — ATORVASTATIN CALCIUM 40 MILLIGRAM(S): 80 TABLET, FILM COATED ORAL at 21:35

## 2018-03-02 NOTE — PROGRESS NOTE ADULT - PROBLEM SELECTOR PLAN 2
- likely 2/2 volume overload, now off of supplemental O2. Sat 90-93% overnight without symptoms.  - CXR showing worsening b/l pleural effusions on 2/27  - [place] 46971 on this admission, pending TTE.  - will check ABG if pt hypoxic again

## 2018-03-02 NOTE — PROGRESS NOTE ADULT - ASSESSMENT
63M LEFT toes, forefoot, midfoot blister with cellulitis (improving with local care).  - Pt seen and evaluated  - Appearance of foot continues to improve  - Cont IV abx  - Silvadene to LEFT foot wound daily  - No pod sx intervention at this time  - seen w/ attending

## 2018-03-02 NOTE — PROGRESS NOTE ADULT - PROBLEM SELECTOR PLAN 2
Acidosis in the setting of JANY and infection. Serum CO2 stable at 22 today. Pt. on oral sodium bicarbonate therapy. Monitor serum CO2

## 2018-03-02 NOTE — PROGRESS NOTE ADULT - PROBLEM SELECTOR PLAN 1
Pt. with JANY on CKD in the setting of infection and diarrhea. CKD in the setting of long-standing DM. Scr was 1.5 in 3/2017, increased to 2.30 in 7/2017. Scr on admission (2/12) was elevated at 3.81, which peaked to 6.3 on 2/23 and decreased to 4.34 today. Pt. with likely ATN. Monitor BMP and urine output. Avoid any potential nephrotoxins. Pt. at increased risk for radiocontrast nephropathy

## 2018-03-02 NOTE — PROGRESS NOTE ADULT - ASSESSMENT
64 y/o male with PMH of HTN, DM, right BKA and CKD admitted with left foot infection. Pt. now with resolving JANY.

## 2018-03-02 NOTE — PROGRESS NOTE ADULT - ATTENDING COMMENTS
no SOB.    foot wound with some dusky areas now.    imp -62 yo M with h/o DMT2, CKD, R-BKA p/w cellulitis/diabetic foot infection of the left foot-> sepsis/ss and JANY upon CKD.  now awaiting recovery of renal fxn so we can obtain an angio for vascualr intervention.  course complicated by HF- unclear if PFrEF vs. HFpEF - most likely the latter. echo pending, on BB, holding loop diruetics and ACE-I.

## 2018-03-02 NOTE — PROGRESS NOTE ADULT - SUBJECTIVE AND OBJECTIVE BOX
Patient is a 63y old  Male who presents with a chief complaint of 63M PMH DM, HTN, CKD p/w L foot pain x 3 days. (19 Feb 2018 10:39)       INTERVAL HPI/OVERNIGHT EVENTS:  Patient seen and evaluated at bedside.  Pt is resting comfortable in NAD. Denies N/V/F/C.      Allergies    No Known Allergies    Intolerances        Vital Signs Last 24 Hrs  T(C): 36.8 (02 Mar 2018 05:36), Max: 36.8 (02 Mar 2018 05:36)  T(F): 98.2 (02 Mar 2018 05:36), Max: 98.2 (02 Mar 2018 05:36)  HR: 68 (02 Mar 2018 05:36) (65 - 68)  BP: 176/77 (02 Mar 2018 05:36) (162/56 - 176/77)  BP(mean): --  RR: 17 (02 Mar 2018 05:36) (17 - 20)  SpO2: 100% (02 Mar 2018 05:36) (95% - 100%)    LABS:                        7.5    5.70  )-----------( 333      ( 02 Mar 2018 05:30 )             23.3     03-02    142  |  105  |  71<H>  ----------------------------<  76  4.8   |  22  |  4.34<H>    Ca    8.2<L>      02 Mar 2018 05:30  Phos  5.3     03-01  Mg     2.2     03-01          CAPILLARY BLOOD GLUCOSE      POCT Blood Glucose.: 125 mg/dL (01 Mar 2018 21:51)  POCT Blood Glucose.: 104 mg/dL (01 Mar 2018 17:08)  POCT Blood Glucose.: 114 mg/dL (01 Mar 2018 12:05)  POCT Blood Glucose.: 90 mg/dL (01 Mar 2018 08:27)      Lower Extremity Physical Exam:  Vasular: DP/PT 0/4, B/L, CFT <2 seconds B/L, Temperature gradient warm, B/L.   Neuro: Epicritic sensation absent to the level of toes, B/L.  Musculoskeletal/Ortho: RIGHT BKA.  Skin: Left foot deroofed blister with cherry red non-blanchable trophic changes to plantar aspect of toes 1-5, forefoot, midfoot, no ascending erythema or swelling, no malodor, no purulence, no deep probe, etiology unknown, ROM of toes intact, CFT to each toes normal, no sinus tract, no undermining. Overall appearance of foot continues to improve.

## 2018-03-02 NOTE — PROGRESS NOTE ADULT - SUBJECTIVE AND OBJECTIVE BOX
Progress Note    JAMES PATRICK 63y (1955) Male [id number]  02-12-18 (18d)    DORON Thibodeaux PGY1  Pager# [phone]    Chief Complaint: 63M PMH DM, HTN, CKD p/w L foot pain x 3 days.    Subjective:  No acute events overnight. Patient seen and examined at bedside.    Review of Systems:  CONSTITUTIONAL: No fever, weight loss, or fatigue  EYES: No eye pain, visual disturbances, or discharge  ENMT:  No difficulty hearing, tinnitus, vertigo; No sinus or throat pain  NECK: No pain or stiffness  RESPIRATORY: No cough, wheezing, chills or hemoptysis; No shortness of breath  CARDIOVASCULAR: No chest pain, palpitations, dizziness, or leg swelling  GASTROINTESTINAL: No abdominal or epigastric pain. No nausea, vomiting, or hematemesis; No diarrhea or constipation. No melena or hematochezia.  GENITOURINARY: No dysuria, frequency, hematuria, or incontinence  NEUROLOGICAL: No headaches, memory loss, loss of strength, numbness, or tremors  SKIN: No itching, burning, rashes, or lesions   MUSCULOSKELETAL: No joint pain or swelling; No muscle, back, or extremity pain      PAST MEDICAL & SURGICAL HISTORY:  Kidney disease (N28.9)  HTN (hypertension) (I10)  DM (diabetes mellitus) (E11.9)  S/P BKA (below knee amputation) unilateral, right (Z89.511)  No significant past surgical history ([id number])    acetaminophen   Tablet 650 milliGRAM(s) Oral every 6 hours PRN  acetaminophen   Tablet. 650 milliGRAM(s) Oral every 6 hours PRN  atorvastatin 40 milliGRAM(s) Oral at bedtime  cyanocobalamin 1000 MICROGram(s) Oral daily  dextrose 5%. 1000 milliLiter(s) IV Continuous <Continuous>  dextrose 50% Injectable 12.5 Gram(s) IV Push once  dextrose 50% Injectable 25 Gram(s) IV Push once  dextrose 50% Injectable 25 Gram(s) IV Push once  dextrose Gel 1 Dose(s) Oral once PRN  glucagon  Injectable 1 milliGRAM(s) IntraMuscular once PRN  influenza   Vaccine 0.5 milliLiter(s) IntraMuscular once  insulin lispro (HumaLOG) corrective regimen sliding scale   SubCutaneous three times a day before meals  labetalol 300 milliGRAM(s) Oral three times a day  lactobacillus acidophilus 1 Tablet(s) Oral daily  multivitamin 1 Tablet(s) Oral daily  NIFEdipine XL 30 milliGRAM(s) Oral daily  silver sulfADIAZINE 1% Cream 1 Application(s) Topical daily  sodium bicarbonate 1300 milliGRAM(s) Oral three times a day    Objective:  T(C): 36.8 (03-02-18 @ 05:36), Max: 36.8 (03-02-18 @ 05:36)  HR: 68 (03-02-18 @ 05:36) (65 - 68)  BP: 176/77 (03-02-18 @ 05:36) (162/56 - 176/77)  RR: 17 (03-02-18 @ 05:36) (17 - 20)  SpO2: 100% (03-02-18 @ 05:36) (95% - 100%)    Physical exam:  GENERAL: NAD, well-developed  HEAD:  Atraumatic, Normocephalic  EYES: EOMI, PERRLA, conjunctiva and sclera clear  NECK: Supple, No JVD  CHEST/LUNG: Clear to auscultation bilaterally; No wheeze  HEART: Regular rate and rhythm; No murmurs, rubs, or gallops  ABDOMEN: Soft, Nontender, Nondistended; Bowel sounds present  EXTREMITIES:  2+ Peripheral Pulses, No clubbing, cyanosis, or edema  PSYCH: AAOx3  NEUROLOGY: non-focal  SKIN: No rashes or lesions      02-28-18 @ 07:01  -  03-01-18 @ 07:00  --------------------------------------------------------  IN: 900 mL / OUT: 550 mL / NET: 350 mL    03-01-18 @ 07:01  -  03-02-18 @ 06:52  --------------------------------------------------------  IN: 0 mL / OUT: 300 mL / NET: -300 mL        CAPILLARY BLOOD GLUCOSE      (03-02 @ 05:30)                      7.5  5.70 )-----------( 333                 23.3    Neutrophils = -- (--%)  Lymphocytes = -- (--%)  Eosinophils = -- (--%)  Basophils = -- (--%)  Monocytes = -- (--%)  Bands = --%    03-01    143  |  103  |  75<H>  ----------------------------<  87  4.8   |  24  |  4.77<H>    Ca    8.3<L>      01 Mar 2018 06:00  Phos  5.3     03-01  Mg     2.2     03-01      WBC Trend: 5.70<--, 6.34<--, 7.28<--    Hb Trend: 7.5<--, 7.6<--, 7.2<--, 8.0<--, 6.2<--        New imaging in last 24 hours:  Consult notes reviewed: Progress Note    JAMES PATRICK 63y (1955) Male [id number]  02-12-18 (18d)    Dr. Galvez [facility] PGY1  Pager# [phone]    Chief Complaint: 63M PMH DM, HTN, CKD p/w L foot pain x 3 days.    Subjective:  No acute events overnight. Patient seen and examined at bedside. Patient reports no pain at foot. no diarrhea. Urinating more than usual. No shortness of breath or respiratory distress.    Review of Systems:  CONSTITUTIONAL: No fever, weight loss, or fatigue  EYES: No eye pain, visual disturbances, or discharge  ENMT:  No difficulty hearing, tinnitus, vertigo; No sinus or throat pain  NECK: No pain or stiffness  RESPIRATORY: No cough, wheezing, chills or hemoptysis; No shortness of breath  CARDIOVASCULAR: No chest pain, palpitations, dizziness, or leg swelling  GASTROINTESTINAL: No abdominal or epigastric pain. No nausea, vomiting, or hematemesis; No diarrhea or constipation. No melena or hematochezia.  GENITOURINARY: No dysuria, frequency, hematuria, or incontinence  NEUROLOGICAL: No headaches, memory loss, loss of strength, numbness, or tremors  SKIN: No itching, burning, rashes, or lesions   MUSCULOSKELETAL: No joint pain or swelling; No muscle, back, or extremity pain      PAST MEDICAL & SURGICAL HISTORY:  Kidney disease (N28.9)  HTN (hypertension) (I10)  DM (diabetes mellitus) (E11.9)  S/P BKA (below knee amputation) unilateral, right (Z89.511)  No significant past surgical history ([id number])    acetaminophen   Tablet 650 milliGRAM(s) Oral every 6 hours PRN  acetaminophen   Tablet. 650 milliGRAM(s) Oral every 6 hours PRN  atorvastatin 40 milliGRAM(s) Oral at bedtime  cyanocobalamin 1000 MICROGram(s) Oral daily  dextrose 5%. 1000 milliLiter(s) IV Continuous <Continuous>  dextrose 50% Injectable 12.5 Gram(s) IV Push once  dextrose 50% Injectable 25 Gram(s) IV Push once  dextrose 50% Injectable 25 Gram(s) IV Push once  dextrose Gel 1 Dose(s) Oral once PRN  glucagon  Injectable 1 milliGRAM(s) IntraMuscular once PRN  influenza   Vaccine 0.5 milliLiter(s) IntraMuscular once  insulin lispro (HumaLOG) corrective regimen sliding scale   SubCutaneous three times a day before meals  labetalol 300 milliGRAM(s) Oral three times a day  lactobacillus acidophilus 1 Tablet(s) Oral daily  multivitamin 1 Tablet(s) Oral daily  NIFEdipine XL 30 milliGRAM(s) Oral daily  silver sulfADIAZINE 1% Cream 1 Application(s) Topical daily  sodium bicarbonate 1300 milliGRAM(s) Oral three times a day    Objective:  T(C): 36.8 (03-02-18 @ 05:36), Max: 36.8 (03-02-18 @ 05:36)  HR: 68 (03-02-18 @ 05:36) (65 - 68)  BP: 176/77 (03-02-18 @ 05:36) (162/56 - 176/77)  RR: 17 (03-02-18 @ 05:36) (17 - 20)  SpO2: 100% (03-02-18 @ 05:36) (95% - 100%)    Physical exam:  GENERAL: NAD, well-developed  HEAD:  Atraumatic, Normocephalic  EYES: EOMI, PERRLA, conjunctiva and sclera clear  NECK: Supple, No JVD  CHEST/LUNG: Bilateral bases: dull to auscultation. crackles present in mid-posterior lung fields. Clear anteriorly.   HEART: Regular rate and rhythm; No murmurs, rubs, or gallops  ABDOMEN: Soft, Nontender, Nondistended; Bowel sounds present  EXTREMITIES:  LLE: edema much improved but still present. Sole of foot: +dark region proximal to MTPs with new skin growing below. Patient has break in skin at lateral aspect with some bleeding. No sensation.  PSYCH: AAOx3  NEUROLOGY: See extremities exam  SKIN: See extremities exam.      02-28-18 @ 07:01  -  03-01-18 @ 07:00  --------------------------------------------------------  IN: 900 mL / OUT: 550 mL / NET: 350 mL    03-01-18 @ 07:01  -  03-02-18 @ 06:52  --------------------------------------------------------  IN: 0 mL / OUT: 300 mL / NET: -300 mL        CAPILLARY BLOOD GLUCOSE      (03-02 @ 05:30)                      7.5  5.70 )-----------( 333                 23.3    Neutrophils = -- (--%)  Lymphocytes = -- (--%)  Eosinophils = -- (--%)  Basophils = -- (--%)  Monocytes = -- (--%)  Bands = --%    03-01    Comprehensive Metabolic Panel in AM (02.22.18 @ 06:40)    Sodium, Serum: 142 mmol/L    Potassium, Serum: 4.5 mmol/L    Chloride, Serum: 104 mmol/L    Carbon Dioxide, Serum: 18 mmol/L    Blood Urea Nitrogen, Serum: 74 mg/dL    Creatinine, Serum: 6.13 mg/dL    Glucose, Serum: 91 mg/dL    Calcium, Total Serum: 8.0 mg/dL    Protein Total, Serum: 7.0 g/dL    Albumin, Serum: 2.9 g/dL    Bilirubin Total, Serum: 0.2 mg/dL    Alkaline Phosphatase, Serum: 101: Please note new reference ranges are adjusted for age and  gender. u/L    Aspartate Aminotransferase (AST/SGOT): 40 u/L    Alanine Aminotransferase (ALT/SGPT): 55 u/L    eGFR if Non African American: 9  GFR  (ml/min/1.73 m2)          W/KIDNEY DAMAGE    W/O KIDNEY DMG  ==========================================================  >= 90.......................Stage 1..............Normal  60-89.......................Stage 2...........Decreased GFR  30-59.......................Stage 3..............Stage 3  15-29.......................Stage 4..............Stage 4  < 15........................Stage 5..............Stage 5    Each stage of CKD assumes that the associated GFR level  has been in effect for at least 3 months. Determination of  stages one and two (with eGFR > 59ml/min/m2) requires  estimation of kidney damage for at least 3 months as  defined by structural or functional abnormalities.    Limitations: All estimates of GFR will be less accurate  for patients at extremes of muscle mass (including but  not limited to frail elderly, critically ill, or cancer  patients), those with unusual diets, and those with  conditions associated with reduced secretion or  extrarenal elimination of creatinine. The eGFR equation  is not recommended for use in patients with unstable  creatinine levels. mL/min    eGFR if : 10 mL/min        WBC Trend: 5.70<--, 6.34<--, 7.28<--    Hb Trend: 7.5<--, 7.6<--, 7.2<--, 8.0<--, 6.2<--        New imaging in last 24 hours:  Consult notes reviewed:

## 2018-03-02 NOTE — PROGRESS NOTE ADULT - PROBLEM SELECTOR PLAN 5
-Labetalol 300mg TID  -Likely in setting of worsening creatinine. Have been better controlled. -Labetalol 300mg TID  -Still elevated. -Labetalol 300mg TID  -Still elevated. Given that patient's HR is in the 60s, will not changed to different beta blocker. Will await results of Echo. If EF normal, will increase Nifedipine. If EF reduced, will give hydralazine or another pre-load reducing agent. -Labetalol 300mg TID  -Still elevated. Given that patient's HR is in the 60s, will not changed to different beta blocker. Will await results of Echo. If EF normal, will increase Nifedipine. If EF reduced, will give hydralazine or another pre-load reducing agent.  *PM f/u: TTE with preserved EF but likely diastolic dysfunction. Increased Nifedipine to 60XL.

## 2018-03-02 NOTE — PROGRESS NOTE ADULT - SUBJECTIVE AND OBJECTIVE BOX
[facility] DIVISION OF KIDNEY DISEASES AND HYPERTENSION --    HPI: 63-year-old male with PMH of HTN, DM, right BKA, and CKD admitted for left foot infection. As per review of labs on [place]/[employer], Scr was 1.51 in 3/2017. As per PMD's office, Scr checked in 7/2017 was 2.30. Labs on admission (2/12) showed elevated Scr of 3.8 which peaked to 6.3 on 2/23 and decreased to 4.34 today. Pt. currently resting in bed and denies SOB, CP, or N/V.     PAST HISTORY  --------------------------------------------------------------------------------  No significant changes to PMH, PSH, FHx, SHx, unless otherwise noted    ALLERGIES & MEDICATIONS  --------------------------------------------------------------------------------  Allergies    No Known Allergies    Standing Inpatient Medications  atorvastatin 40 milliGRAM(s) Oral at bedtime  cyanocobalamin 1000 MICROGram(s) Oral daily  dextrose 5%. 1000 milliLiter(s) IV Continuous <Continuous>  dextrose 50% Injectable 12.5 Gram(s) IV Push once  dextrose 50% Injectable 25 Gram(s) IV Push once  dextrose 50% Injectable 25 Gram(s) IV Push once  influenza   Vaccine 0.5 milliLiter(s) IntraMuscular once  insulin lispro (HumaLOG) corrective regimen sliding scale   SubCutaneous three times a day before meals  labetalol 300 milliGRAM(s) Oral three times a day  lactobacillus acidophilus 1 Tablet(s) Oral daily  multivitamin 1 Tablet(s) Oral daily  NIFEdipine XL 30 milliGRAM(s) Oral daily  silver sulfADIAZINE 1% Cream 1 Application(s) Topical daily  sodium bicarbonate 1300 milliGRAM(s) Oral three times a day    REVIEW OF SYSTEMS  --------------------------------------------------------------------------------  General: no fever  CVS: no chest pain  RESP: no SOB  ABD: no abdominal pain  : no dysuria  MSK: Left leg edema/foot infection    VITALS/PHYSICAL EXAM  --------------------------------------------------------------------------------  T(C): 36.8 (03-02-18 @ 05:36), Max: 36.8 (03-02-18 @ 05:36)  HR: 68 (03-02-18 @ 05:36) (65 - 68)  BP: 176/77 (03-02-18 @ 05:36) (162/56 - 176/77)  RR: 17 (03-02-18 @ 05:36) (17 - 20)  SpO2: 100% (03-02-18 @ 05:36) (95% - 100%)  Wt(kg): --    03-01-18 @ 07:01  -  03-02-18 @ 07:00  --------------------------------------------------------  IN: 410 mL / OUT: 890 mL / NET: -480 mL    Physical Exam:  	Gen: Resting in bed   	HEENT: No JVD  	Pulm: CTA B/L  	CV: S1S2+  	Abd:  soft  	LE: Right BKA, LLE edema present, left foot dressing seen   	Neuro: Awake and alert   	Psych: Normal affect and mood  	Skin: Warm    LABS/STUDIES  --------------------------------------------------------------------------------              7.5    5.70  >-----------<  333      [03-02-18 @ 05:30]              23.3     142  |  105  |  71  ----------------------------<  76      [03-02-18 @ 05:30]  4.8   |  22  |  4.34        Ca     8.2     [03-02-18 @ 05:30]      Mg     2.2     [03-01-18 @ 06:00]      Phos  5.3     [03-01-18 @ 06:00]    Creatinine Trend:  SCr 4.34 [03-02 @ 05:30]  SCr 4.77 [03-01 @ 06:00]  SCr 4.92 [02-28 @ 05:30]  SCr 5.12 [02-27 @ 06:30]  SCr 5.43 [02-26 @ 06:05]

## 2018-03-02 NOTE — PROGRESS NOTE ADULT - ASSESSMENT
63M PMH DM on januvia, FS 80s at home, HTN, CKD, R BKA in 2009 presents with 3 day history of worsening foot pain concerning for cellulitis, currently stable. ATN is improving. Noted to have asymptomatic hypoxia, now resolved.

## 2018-03-02 NOTE — PROGRESS NOTE ADULT - PROBLEM SELECTOR PLAN 7
-Heparin subq  -Dispo: As creatinine continues to improve, will schedule angiogram and recall vascular. Need to obtain echo.

## 2018-03-02 NOTE — PROGRESS NOTE ADULT - PROBLEM SELECTOR PLAN 1
-ATN on CKD stage III in the setting of sepsis. Improving and trending down.  -Patient still urinating, now reporting more. Continuing to monitor urine outputs.  -Pt is still fluid overloaded and If becomes SOB, will give 80 Lasix IV and consider bipap. Fluid overload seems to be improving, however, as leg swelling decreased and lung exam sounds better. Patient may be approaching the auto-diuresing stage of his ATN.  -Renal consult appreciated. Renal u/s negative. Spun down urine demonstrating granular casts suggesting ATN. Baseline creatinine is 2.3.   -Avoiding nephrotoxins and RCA.  -Sodium bicarb TID with improving bicarb. -ATN on CKD stage III in the setting of sepsis. Improving and trending down.  -Patient still urinating, now reporting more. Continuing to monitor urine outputs.  -Pt is still fluid overloaded and If becomes SOB, will give 80 Lasix IV and consider bipap. Fluid overload seems to be improving, however, as leg swelling decreased and lung exam sounds better. Patient may be approaching the auto-diuresing stage of his ATN.  -Renal consult appreciated. Renal u/s negative. Spun down urine demonstrating granular casts suggesting ATN. Baseline creatinine is 2.3. Immunofixation negative for monoclonal expansion.  -Avoiding nephrotoxins and RCA.  -Sodium bicarb TID with improving bicarb.

## 2018-03-03 LAB
BUN SERPL-MCNC: 63 MG/DL — HIGH (ref 7–23)
CALCIUM SERPL-MCNC: 8.2 MG/DL — LOW (ref 8.4–10.5)
CHLORIDE SERPL-SCNC: 107 MMOL/L — SIGNIFICANT CHANGE UP (ref 98–107)
CO2 SERPL-SCNC: 23 MMOL/L — SIGNIFICANT CHANGE UP (ref 22–31)
CREAT SERPL-MCNC: 4.32 MG/DL — HIGH (ref 0.5–1.3)
GLUCOSE BLDC GLUCOMTR-MCNC: 120 MG/DL — HIGH (ref 70–99)
GLUCOSE BLDC GLUCOMTR-MCNC: 139 MG/DL — HIGH (ref 70–99)
GLUCOSE BLDC GLUCOMTR-MCNC: 140 MG/DL — HIGH (ref 70–99)
GLUCOSE BLDC GLUCOMTR-MCNC: 85 MG/DL — SIGNIFICANT CHANGE UP (ref 70–99)
GLUCOSE SERPL-MCNC: 77 MG/DL — SIGNIFICANT CHANGE UP (ref 70–99)
POTASSIUM SERPL-MCNC: 5.1 MMOL/L — SIGNIFICANT CHANGE UP (ref 3.5–5.3)
POTASSIUM SERPL-SCNC: 5.1 MMOL/L — SIGNIFICANT CHANGE UP (ref 3.5–5.3)
SODIUM SERPL-SCNC: 144 MMOL/L — SIGNIFICANT CHANGE UP (ref 135–145)

## 2018-03-03 PROCEDURE — 99232 SBSQ HOSP IP/OBS MODERATE 35: CPT

## 2018-03-03 PROCEDURE — 99233 SBSQ HOSP IP/OBS HIGH 50: CPT

## 2018-03-03 RX ORDER — HEPARIN SODIUM 5000 [USP'U]/ML
5000 INJECTION INTRAVENOUS; SUBCUTANEOUS EVERY 8 HOURS
Refills: 0 | Status: DISCONTINUED | OUTPATIENT
Start: 2018-03-03 | End: 2018-03-13

## 2018-03-03 RX ADMIN — ATORVASTATIN CALCIUM 40 MILLIGRAM(S): 80 TABLET, FILM COATED ORAL at 22:25

## 2018-03-03 RX ADMIN — Medication 300 MILLIGRAM(S): at 22:25

## 2018-03-03 RX ADMIN — Medication 60 MILLIGRAM(S): at 05:32

## 2018-03-03 RX ADMIN — Medication 1 TABLET(S): at 13:31

## 2018-03-03 RX ADMIN — Medication 1 APPLICATION(S): at 13:32

## 2018-03-03 RX ADMIN — Medication 1300 MILLIGRAM(S): at 13:32

## 2018-03-03 RX ADMIN — Medication 1300 MILLIGRAM(S): at 05:32

## 2018-03-03 RX ADMIN — Medication 1300 MILLIGRAM(S): at 22:25

## 2018-03-03 RX ADMIN — HEPARIN SODIUM 5000 UNIT(S): 5000 INJECTION INTRAVENOUS; SUBCUTANEOUS at 22:25

## 2018-03-03 RX ADMIN — HEPARIN SODIUM 5000 UNIT(S): 5000 INJECTION INTRAVENOUS; SUBCUTANEOUS at 13:31

## 2018-03-03 RX ADMIN — PREGABALIN 1000 MICROGRAM(S): 225 CAPSULE ORAL at 13:31

## 2018-03-03 RX ADMIN — Medication 300 MILLIGRAM(S): at 05:32

## 2018-03-03 NOTE — PROGRESS NOTE ADULT - ASSESSMENT
63M PMH DM on januvia, FS 80s at home, HTN, CKD, R BKA in 2009 presents with 3 day history of worsening foot pain concerning for cellulitis, currently stable. ATN is improving with good UO

## 2018-03-03 NOTE — PROGRESS NOTE ADULT - PROBLEM SELECTOR PLAN 1
Pt. with JANY on CKD in the setting of infection and diarrhea. CKD in the setting of long-standing DM. Scr was 1.5 in 3/2017, increased to 2.30 in 7/2017. Scr on admission (2/12) was elevated at 3.81, which peaked to 6.3 on 2/23 and decreased to 4.32 today. Pt. with likely ATN. Monitor BMP and urine output. Avoid any potential nephrotoxins. Pt. at increased risk for radiocontrast nephropathy

## 2018-03-03 NOTE — PROGRESS NOTE ADULT - PROBLEM SELECTOR PLAN 1
-ATN on CKD stage III in the setting of sepsis. UA on admission with granular casts. Improving and trending down.  -In polyuric phase. Continuing to monitor urine outputs.  - Clinically euvolemic today  - Immunofixation negative for monoclonal expansion.  -Avoiding nephrotoxins and RCA.  -Sodium bicarb TID with improving bicarb.

## 2018-03-03 NOTE — PROGRESS NOTE ADULT - PROBLEM SELECTOR PLAN 4
-Labetalol 300mg TID  -Still elevated. Given that patient's HR is in the 60s, will not changed to different beta blocker. Will await results of Echo. If EF normal, will increase Nifedipine. If EF reduced, will give hydralazine or another pre-load reducing agent.  *PM f/u: TTE with preserved EF but ?diastolic dysfunction. Increased Nifedipine to 60XL. -Labetalol 300mg TID  -Still elevated. Given that patient's HR is in the 60s, will not changed to different beta blocker.  *PM f/u: TTE with preserved EF but ?diastolic dysfunction. Increased Nifedipine to 60XL.

## 2018-03-03 NOTE — PROGRESS NOTE ADULT - SUBJECTIVE AND OBJECTIVE BOX
Patient is a 63y old  Male who presents with a chief complaint of 63M PMH DM, HTN, CKD p/w L foot pain x 3 days. (19 Feb 2018 10:39)       INTERVAL HPI/OVERNIGHT EVENTS:    No acute events overnight. Patient seen and examined. Denies acute complaints. No F/C/N/V/NS    MEDICATIONS  (STANDING):  atorvastatin 40 milliGRAM(s) Oral at bedtime  cyanocobalamin 1000 MICROGram(s) Oral daily  dextrose 5%. 1000 milliLiter(s) (50 mL/Hr) IV Continuous <Continuous>  dextrose 50% Injectable 12.5 Gram(s) IV Push once  dextrose 50% Injectable 25 Gram(s) IV Push once  dextrose 50% Injectable 25 Gram(s) IV Push once  influenza   Vaccine 0.5 milliLiter(s) IntraMuscular once  insulin lispro (HumaLOG) corrective regimen sliding scale   SubCutaneous three times a day before meals  labetalol 300 milliGRAM(s) Oral three times a day  lactobacillus acidophilus 1 Tablet(s) Oral daily  multivitamin 1 Tablet(s) Oral daily  NIFEdipine XL 60 milliGRAM(s) Oral daily  silver sulfADIAZINE 1% Cream 1 Application(s) Topical daily  sodium bicarbonate 1300 milliGRAM(s) Oral three times a day    MEDICATIONS  (PRN):  acetaminophen   Tablet 650 milliGRAM(s) Oral every 6 hours PRN For Temp greater than 38 C (100.4 F)  acetaminophen   Tablet. 650 milliGRAM(s) Oral every 6 hours PRN Moderate Pain (4 - 6)  dextrose Gel 1 Dose(s) Oral once PRN Blood Glucose LESS THAN 70 milliGRAM(s)/deciliter  glucagon  Injectable 1 milliGRAM(s) IntraMuscular once PRN Glucose LESS THAN 70 milligrams/deciliter      Allergies    No Known Allergies    Intolerances        REVIEW OF SYSTEMS:  CONSTITUTIONAL: No fever, weight loss, or fatigue  EYES: No eye pain, visual disturbances, or discharge  ENMT:  No difficulty hearing, tinnitus, vertigo; No sinus or throat pain  RESPIRATORY: No cough, wheezing, chills or hemoptysis; No shortness of breath  CARDIOVASCULAR: No chest pain, palpitations, dizziness, or leg swelling  GASTROINTESTINAL: No abdominal or epigastric pain. No nausea, vomiting, or hematemesis; No diarrhea or constipation. No melena or hematochezia.  GENITOURINARY: No dysuria, frequency, hematuria, or incontinence  NEUROLOGICAL: No headaches, loss of strength, numbness, or tremors  SKIN: No itching, burning, rashes, or lesions   MUSCULOSKELETAL: No joint pain or swelling;   PSYCHIATRIC: Denies depression, anxiety  HEME/LYMPH: No easy bruising, or bleeding gums    Vital Signs Last 24 Hrs  T(C): 36.9 (03 Mar 2018 05:04), Max: 37.3 (02 Mar 2018 20:37)  T(F): 98.5 (03 Mar 2018 05:04), Max: 99.1 (02 Mar 2018 20:37)  HR: 64 (03 Mar 2018 05:04) (61 - 69)  BP: 152/56 (03 Mar 2018 05:04) (144/60 - 177/71)  BP(mean): --  RR: 20 (03 Mar 2018 05:04) (17 - 20)  SpO2: 94% (03 Mar 2018 05:04) (92% - 95%)    PHYSICAL EXAM:  GENERAL: NAD, well-groomed,  HEAD:  Atraumatic, Normocephalic  EYES: EOMI, PERRLA, conjunctiva and sclera clear  ENMT: No tonsillar erythema, exudates, or enlargement; Moist mucous membranes, Good dentition  NECK: Supple, No JVD  NERVOUS SYSTEM: AOX3, motor and sensation grossly intact in b/l UE and LLE  PSYCHIATRIC: Appropriate affect and mood  CHEST/LUNG: Clear to auscultation bilaterally; No rales, rhonchi, wheezing, or rubs  HEART: Regular rate and rhythm; No murmurs, rubs, or gallops. No LE edema  ABDOMEN: Soft, Nontender, Nondistended; Bowel sounds present  EXTREMITIES:  2+ L DP. s/p R AKA  SKIN: No rashes or lesions    LABS:    03 Mar 2018 06:30    144    |  107    |  63     ----------------------------<  77     5.1     |  23     |  4.32     Ca    8.2        03 Mar 2018 06:30        CAPILLARY BLOOD GLUCOSE      POCT Blood Glucose.: 85 mg/dL (03 Mar 2018 08:27)  POCT Blood Glucose.: 108 mg/dL (02 Mar 2018 22:33)  POCT Blood Glucose.: 93 mg/dL (02 Mar 2018 17:16)  POCT Blood Glucose.: 96 mg/dL (02 Mar 2018 12:23)    BLOOD CULTURE    RADIOLOGY & ADDITIONAL TESTS:    Imaging Personally Reviewed:  [ ] YES     Consultant(s) Notes Reviewed:    Nephrology  Care Discussed with Consultants/Other Providers:

## 2018-03-03 NOTE — PROGRESS NOTE ADULT - ATTENDING COMMENTS
Pt seen and examined at 1245pm, agree with HS' note edited as appropriate, briefly this is a 64 yo M with h/o DMT2, CKD, R-BKA p/w cellulitis/diabetic foot infection of the left foot-> sepsis/ss and JANY upon CKD.  now awaiting recovery of renal fxn so we can obtain an angio for vascular intervention.  Cont to monitor renal function, nifedipine increased to 60mg, Plan discussed with HS. Pt seen and examined at 1245pm, agree with HS' note edited as appropriate, briefly this is a 64 yo M with h/o DMT2, CKD, R-BKA p/w cellulitis/diabetic foot infection of the left foot-> sepsis/ss and JANY upon CKD.  now awaiting recovery of renal fxn so we can obtain an angio for vascular intervention.  Cont to monitor renal function, Plan discussed with HS.

## 2018-03-03 NOTE — PROGRESS NOTE ADULT - SUBJECTIVE AND OBJECTIVE BOX
[facility] DIVISION OF KIDNEY DISEASES AND HYPERTENSION --    HPI: 63-year-old male with PMH of HTN, DM, right BKA, and CKD admitted for left foot infection. As per review of labs on [place]/[employer], Scr was 1.51 in 3/2017. As per PMD's office, Scr checked in 7/2017 was 2.30. Labs on admission (2/12) showed elevated Scr of 3.8 which peaked to 6.3 on 2/23 and decreased to 4.32 today. Pt. currently resting in bed and denies SOB, CP, or N/V.     PAST HISTORY  --------------------------------------------------------------------------------  No significant changes to PMH, PSH, FHx, SHx, unless otherwise noted    ALLERGIES & MEDICATIONS  --------------------------------------------------------------------------------  Allergies    No Known Allergies    Standing Inpatient Medications  atorvastatin 40 milliGRAM(s) Oral at bedtime  cyanocobalamin 1000 MICROGram(s) Oral daily  dextrose 5%. 1000 milliLiter(s) IV Continuous <Continuous>  dextrose 50% Injectable 12.5 Gram(s) IV Push once  dextrose 50% Injectable 25 Gram(s) IV Push once  dextrose 50% Injectable 25 Gram(s) IV Push once  influenza   Vaccine 0.5 milliLiter(s) IntraMuscular once  insulin lispro (HumaLOG) corrective regimen sliding scale   SubCutaneous three times a day before meals  labetalol 300 milliGRAM(s) Oral three times a day  lactobacillus acidophilus 1 Tablet(s) Oral daily  multivitamin 1 Tablet(s) Oral daily  NIFEdipine XL 60 milliGRAM(s) Oral daily  silver sulfADIAZINE 1% Cream 1 Application(s) Topical daily  sodium bicarbonate 1300 milliGRAM(s) Oral three times a day    PRN Inpatient Medications  acetaminophen   Tablet 650 milliGRAM(s) Oral every 6 hours PRN  acetaminophen   Tablet. 650 milliGRAM(s) Oral every 6 hours PRN  dextrose Gel 1 Dose(s) Oral once PRN  glucagon  Injectable 1 milliGRAM(s) IntraMuscular once PRN      REVIEW OF SYSTEMS  --------------------------------------------------------------------------------  CVS: no chest pain  RESP: no SOB  ABD: no abdominal pain  : no dysuria  MSK: Left leg edema/foot infection    VITALS/PHYSICAL EXAM  --------------------------------------------------------------------------------  T(C): 36.9 (03-03-18 @ 05:04), Max: 37.3 (03-02-18 @ 20:37)  HR: 64 (03-03-18 @ 05:04) (61 - 69)  BP: 152/56 (03-03-18 @ 05:04) (144/60 - 177/71)  RR: 20 (03-03-18 @ 05:04) (17 - 20)  SpO2: 94% (03-03-18 @ 05:04) (92% - 95%)  Wt(kg): --    03-02-18 @ 07:01  -  03-03-18 @ 07:00  --------------------------------------------------------  IN: 0 mL / OUT: 275 mL / NET: -275 mL    Physical Exam:  	Gen: Resting in bed   	HEENT: No JVD  	Pulm: CTA B/L  	CV: S1S2+  	Abd:  soft  	LE: Right BKA, LLE edema present, left foot dressing seen   	Neuro: Awake and alert   	Psych: Normal affect and mood  	Skin: Warm    LABS/STUDIES  --------------------------------------------------------------------------------              7.5    5.70  >-----------<  333      [03-02-18 @ 05:30]              23.3     144  |  107  |  63  ----------------------------<  77      [03-03-18 @ 06:30]  5.1   |  23  |  4.32        Ca     8.2     [03-03-18 @ 06:30]    Creatinine Trend:  SCr 4.32 [03-03 @ 06:30]  SCr 4.34 [03-02 @ 05:30]  SCr 4.77 [03-01 @ 06:00]  SCr 4.92 [02-28 @ 05:30]  SCr 5.12 [02-27 @ 06:30]

## 2018-03-03 NOTE — PROGRESS NOTE ADULT - PROBLEM SELECTOR PLAN 6
-Heparin subq  -Dispo: As creatinine continues to improve, will schedule angiogram and recall vascular. Need to obtain echo. D/c to rehab -Heparin subq  -Dispo: As creatinine continues to improve, will schedule angiogram and recall vascular.  D/c to rehab

## 2018-03-03 NOTE — PROGRESS NOTE ADULT - PROBLEM SELECTOR PLAN 2
Acidosis in the setting of JANY and infection. Serum CO2 improved to 23 today. Pt. on oral sodium bicarbonate therapy. Monitor serum CO2

## 2018-03-04 DIAGNOSIS — E87.79 OTHER FLUID OVERLOAD: ICD-10-CM

## 2018-03-04 LAB
BUN SERPL-MCNC: 68 MG/DL — HIGH (ref 7–23)
CALCIUM SERPL-MCNC: 8.5 MG/DL — SIGNIFICANT CHANGE UP (ref 8.4–10.5)
CHLORIDE SERPL-SCNC: 105 MMOL/L — SIGNIFICANT CHANGE UP (ref 98–107)
CO2 SERPL-SCNC: 24 MMOL/L — SIGNIFICANT CHANGE UP (ref 22–31)
CREAT SERPL-MCNC: 4.33 MG/DL — HIGH (ref 0.5–1.3)
GLUCOSE BLDC GLUCOMTR-MCNC: 106 MG/DL — HIGH (ref 70–99)
GLUCOSE BLDC GLUCOMTR-MCNC: 124 MG/DL — HIGH (ref 70–99)
GLUCOSE BLDC GLUCOMTR-MCNC: 152 MG/DL — HIGH (ref 70–99)
GLUCOSE BLDC GLUCOMTR-MCNC: 88 MG/DL — SIGNIFICANT CHANGE UP (ref 70–99)
GLUCOSE SERPL-MCNC: 90 MG/DL — SIGNIFICANT CHANGE UP (ref 70–99)
POTASSIUM SERPL-MCNC: 4.8 MMOL/L — SIGNIFICANT CHANGE UP (ref 3.5–5.3)
POTASSIUM SERPL-SCNC: 4.8 MMOL/L — SIGNIFICANT CHANGE UP (ref 3.5–5.3)
SODIUM SERPL-SCNC: 144 MMOL/L — SIGNIFICANT CHANGE UP (ref 135–145)

## 2018-03-04 PROCEDURE — 99233 SBSQ HOSP IP/OBS HIGH 50: CPT | Mod: GC

## 2018-03-04 RX ORDER — FUROSEMIDE 40 MG
60 TABLET ORAL ONCE
Refills: 0 | Status: COMPLETED | OUTPATIENT
Start: 2018-03-04 | End: 2018-03-04

## 2018-03-04 RX ADMIN — Medication 1300 MILLIGRAM(S): at 15:04

## 2018-03-04 RX ADMIN — Medication 1 TABLET(S): at 11:17

## 2018-03-04 RX ADMIN — Medication 1300 MILLIGRAM(S): at 21:42

## 2018-03-04 RX ADMIN — HEPARIN SODIUM 5000 UNIT(S): 5000 INJECTION INTRAVENOUS; SUBCUTANEOUS at 06:01

## 2018-03-04 RX ADMIN — Medication 60 MILLIGRAM(S): at 11:17

## 2018-03-04 RX ADMIN — PREGABALIN 1000 MICROGRAM(S): 225 CAPSULE ORAL at 11:17

## 2018-03-04 RX ADMIN — Medication 1 APPLICATION(S): at 11:18

## 2018-03-04 RX ADMIN — HEPARIN SODIUM 5000 UNIT(S): 5000 INJECTION INTRAVENOUS; SUBCUTANEOUS at 15:04

## 2018-03-04 RX ADMIN — ATORVASTATIN CALCIUM 40 MILLIGRAM(S): 80 TABLET, FILM COATED ORAL at 21:42

## 2018-03-04 RX ADMIN — Medication 300 MILLIGRAM(S): at 06:01

## 2018-03-04 RX ADMIN — Medication 300 MILLIGRAM(S): at 21:42

## 2018-03-04 RX ADMIN — HEPARIN SODIUM 5000 UNIT(S): 5000 INJECTION INTRAVENOUS; SUBCUTANEOUS at 21:42

## 2018-03-04 RX ADMIN — Medication 1 TABLET(S): at 11:18

## 2018-03-04 RX ADMIN — Medication 60 MILLIGRAM(S): at 06:01

## 2018-03-04 RX ADMIN — Medication 1300 MILLIGRAM(S): at 06:01

## 2018-03-04 NOTE — PROGRESS NOTE ADULT - PROBLEM SELECTOR PLAN 2
Acidosis in the setting of JANY and infection. Serum CO2 improved to 24 today. Pt. on oral sodium bicarbonate therapy. Monitor serum CO2

## 2018-03-04 NOTE — PROGRESS NOTE ADULT - PROBLEM SELECTOR PLAN 1
Pt. with JANY on CKD in the setting of infection and diarrhea. CKD in the setting of long-standing DM. Scr was 1.5 in 3/2017, increased to 2.30 in 7/2017. Scr on admission (2/12) was elevated at 3.81, which peaked to 6.3 on 2/23 and decreased to 4.33 today. Pt. with likely ATN. Monitor BMP and urine output. Avoid any potential nephrotoxins. Pt. at increased risk for radiocontrast nephropathy

## 2018-03-04 NOTE — PROGRESS NOTE ADULT - PROBLEM SELECTOR PLAN 3
In setting of acute renal failure.  Volume status worse today with increased edema and rales.  Education provided on fluid and salt restriction.  Agree with 60mg IV lasix.  If patient has good response would continue 60mg IV BID. In setting of acute renal failure.  Volume status ncreased edema  Education provided on fluid and salt restriction.  would continue lasix as needed

## 2018-03-04 NOTE — PROGRESS NOTE ADULT - PROBLEM SELECTOR PLAN 1
-ATN on CKD stage III in the setting of sepsis. UA on admission with granular casts. Improving and trending down. 4.3 may be his new baseline creatinine.   -In polyuric phase. Continuing to monitor urine outputs.  -Clinically hypervolemic today given orthopnea. Will administer Lasix 60mg IV x1 and re-assess.  - Immunofixation negative for monoclonal expansion.  -Avoiding nephrotoxins and RCA.  -Sodium bicarb TID with improving bicarb.

## 2018-03-04 NOTE — PROGRESS NOTE ADULT - SUBJECTIVE AND OBJECTIVE BOX
[facility] DIVISION OF KIDNEY DISEASES AND HYPERTENSION -- FOLLOW UP NOTE  --------------------------------------------------------------------------------  63-year-old male with PMH of HTN, DM, right BKA, and CKD admitted for left foot infection. As per review of labs on [place]/[employer], Scr was 1.51 in 3/2017. As per PMD's office, Scr checked in 7/2017 was 2.30. Labs on admission (2/12) showed elevated Scr of 3.8 which peaked to 6.3 on 2/23 and decreased to 4.33 today. Pt. currently resting in bed and denies SOB, CP, or N/V.  Reports that he feels short of breath when he takes off his oxygen nasal canula 2L.        PAST HISTORY  --------------------------------------------------------------------------------  No significant changes to PMH, PSH, FHx, SHx, unless otherwise noted    ALLERGIES & MEDICATIONS  --------------------------------------------------------------------------------  Allergies    No Known Allergies    Intolerances      Standing Inpatient Medications  atorvastatin 40 milliGRAM(s) Oral at bedtime  cyanocobalamin 1000 MICROGram(s) Oral daily  dextrose 5%. 1000 milliLiter(s) IV Continuous <Continuous>  dextrose 50% Injectable 12.5 Gram(s) IV Push once  dextrose 50% Injectable 25 Gram(s) IV Push once  dextrose 50% Injectable 25 Gram(s) IV Push once  furosemide   Injectable 60 milliGRAM(s) IV Push once  heparin  Injectable 5000 Unit(s) SubCutaneous every 8 hours  influenza   Vaccine 0.5 milliLiter(s) IntraMuscular once  insulin lispro (HumaLOG) corrective regimen sliding scale   SubCutaneous three times a day before meals  labetalol 300 milliGRAM(s) Oral three times a day  lactobacillus acidophilus 1 Tablet(s) Oral daily  multivitamin 1 Tablet(s) Oral daily  NIFEdipine XL 60 milliGRAM(s) Oral daily  silver sulfADIAZINE 1% Cream 1 Application(s) Topical daily  sodium bicarbonate 1300 milliGRAM(s) Oral three times a day    PRN Inpatient Medications  acetaminophen   Tablet 650 milliGRAM(s) Oral every 6 hours PRN  acetaminophen   Tablet. 650 milliGRAM(s) Oral every 6 hours PRN  dextrose Gel 1 Dose(s) Oral once PRN  glucagon  Injectable 1 milliGRAM(s) IntraMuscular once PRN      REVIEW OF SYSTEMS  --------------------------------------------------------------------------------  Gen: No fevers/chills  Skin: No rashes  Head/Eyes/Ears/Mouth: No headache  Respiratory: +dyspnea off oxygen  CV: No chest pain  GI: No abdominal pain  : No dysuria  MSK: +edema  Neuro: No dizziness/lightheadedness    VITALS/PHYSICAL EXAM  --------------------------------------------------------------------------------  T(C): 36.8 (03-04-18 @ 05:55), Max: 36.8 (03-03-18 @ 13:25)  HR: 63 (03-04-18 @ 05:55) (58 - 63)  BP: 155/71 (03-04-18 @ 05:55) (141/60 - 158/68)  RR: 20 (03-04-18 @ 05:55) (18 - 20)  SpO2: 97% (03-04-18 @ 05:55) (92% - 97%)  Wt(kg): --        03-03-18 @ 07:01  -  03-04-18 @ 07:00  --------------------------------------------------------  IN: 720 mL / OUT: 1000 mL / NET: -280 mL      Physical Exam:  	Gen: NAD, well-appearing  	HEENT: MMM  	Pulm: +bibasilar rales  	CV: RRR, S1S2; no rub  	Abd: +BS, soft, nontender/nondistended  	: No suprapubic tenderness  	UE:  no edema  	LE:  1+ pitting edema LLE, RLE amputation  	Neuro: No focal deficits  	Psych: Normal affect and mood  	Skin: Warm    LABS/STUDIES  --------------------------------------------------------------------------------    144  |  105  |  68  ----------------------------<  90      [03-04-18 @ 06:20]  4.8   |  24  |  4.33        Ca     8.5     [03-04-18 @ 06:20]      Creatinine Trend:  SCr 4.33 [03-04 @ 06:20]  SCr 4.32 [03-03 @ 06:30]  SCr 4.34 [03-02 @ 05:30]  SCr 4.77 [03-01 @ 06:00]  SCr 4.92 [02-28 @ 05:30] [facility] DIVISION OF KIDNEY DISEASES AND HYPERTENSION -- FOLLOW UP NOTE  --------------------------------------------------------------------------------  63-year-old male with PMH of HTN, DM, right BKA, and CKD admitted for left foot infection. As per review of labs on [place]/[employer], Scr was 1.51 in 3/2017. As per PMD's office, Scr checked in 7/2017 was 2.30. Labs on admission (2/12) showed elevated Scr of 3.8 which peaked to 6.3 on 2/23 and decreased to 4.33 today. Pt. currently resting in bed and denies SOB, CP, or N/V.  Reports that he feels short of breath when he takes off his oxygen nasal canula 2L.        PAST HISTORY  --------------------------------------------------------------------------------  No significant changes to PMH, PSH, FHx, SHx, unless otherwise noted    ALLERGIES & MEDICATIONS  --------------------------------------------------------------------------------  Allergies    No Known Allergies    Intolerances      Standing Inpatient Medications  atorvastatin 40 milliGRAM(s) Oral at bedtime  cyanocobalamin 1000 MICROGram(s) Oral daily  dextrose 5%. 1000 milliLiter(s) IV Continuous <Continuous>  dextrose 50% Injectable 12.5 Gram(s) IV Push once  dextrose 50% Injectable 25 Gram(s) IV Push once  dextrose 50% Injectable 25 Gram(s) IV Push once  furosemide   Injectable 60 milliGRAM(s) IV Push once  heparin  Injectable 5000 Unit(s) SubCutaneous every 8 hours  influenza   Vaccine 0.5 milliLiter(s) IntraMuscular once  insulin lispro (HumaLOG) corrective regimen sliding scale   SubCutaneous three times a day before meals  labetalol 300 milliGRAM(s) Oral three times a day  lactobacillus acidophilus 1 Tablet(s) Oral daily  multivitamin 1 Tablet(s) Oral daily  NIFEdipine XL 60 milliGRAM(s) Oral daily  silver sulfADIAZINE 1% Cream 1 Application(s) Topical daily  sodium bicarbonate 1300 milliGRAM(s) Oral three times a day    PRN Inpatient Medications  acetaminophen   Tablet 650 milliGRAM(s) Oral every 6 hours PRN  acetaminophen   Tablet. 650 milliGRAM(s) Oral every 6 hours PRN  dextrose Gel 1 Dose(s) Oral once PRN  glucagon  Injectable 1 milliGRAM(s) IntraMuscular once PRN      REVIEW OF SYSTEMS  --------------------------------------------------------------------------------  Gen: No fevers/chills  Skin: No rashes  Head/Eyes/Ears/Mouth: No headache  Respiratory: No dyspnea  CV: No chest pain  GI: No abdominal pain  : No dysuria  MSK: +edema. r AKA, Left foot pain  Neuro: No dizziness/lightheadedness    VITALS/PHYSICAL EXAM  --------------------------------------------------------------------------------  T(C): 36.8 (03-04-18 @ 05:55), Max: 36.8 (03-03-18 @ 13:25)  HR: 63 (03-04-18 @ 05:55) (58 - 63)  BP: 155/71 (03-04-18 @ 05:55) (141/60 - 158/68)  RR: 20 (03-04-18 @ 05:55) (18 - 20)  SpO2: 97% (03-04-18 @ 05:55) (92% - 97%)  Wt(kg): --        03-03-18 @ 07:01  -  03-04-18 @ 07:00  --------------------------------------------------------  IN: 720 mL / OUT: 1000 mL / NET: -280 mL      Physical Exam:  	Gen: NAD, well-appearing  	HEENT: MMM  	Pulm: +bibasilar rales  	CV: RRR, S1S2; no rub  	Abd: +BS, soft, nontender/nondistended  	: No suprapubic tenderness  	UE:  no edema  	LE:  1+ pitting edema LLE, RLE amputation  	Neuro: No focal deficits  	Psych: Normal affect and mood  	Skin: Warm    LABS/STUDIES  --------------------------------------------------------------------------------    144  |  105  |  68  ----------------------------<  90      [03-04-18 @ 06:20]  4.8   |  24  |  4.33        Ca     8.5     [03-04-18 @ 06:20]      Creatinine Trend:  SCr 4.33 [03-04 @ 06:20]  SCr 4.32 [03-03 @ 06:30]  SCr 4.34 [03-02 @ 05:30]  SCr 4.77 [03-01 @ 06:00]  SCr 4.92 [02-28 @ 05:30]

## 2018-03-04 NOTE — PROGRESS NOTE ADULT - ATTENDING COMMENTS
Pt seen and examined at 1045pm, agree with HS' note edited as appropriate, briefly this is a 64 yo M with h/o DMT2, CKD, R-BKA p/w cellulitis/diabetic foot infection of the left foot-> sepsis/ss and JANY upon CKD. Creatinine about the same   awaiting recovery of renal fxn to obtain an angio for vascular intervention unclear if renal function will improve however.  Cont to monitor renal function, Plan discussed with HS.

## 2018-03-04 NOTE — PROGRESS NOTE ADULT - ASSESSMENT
63M PMH DM on januvia, FS 80s at home, HTN, CKD, R BKA in 2009 presents with 3 day history of worsening foot pain concerning for cellulitis, currently stable. ATN is improving with good UO. However, with SOB overnight requiring oxygen.

## 2018-03-04 NOTE — PROVIDER CONTACT NOTE (OTHER) - ASSESSMENT
bp 149/54 hr 72, due for labetalol. Patient sating low 90's, NC increased to 3L. Patient is asymptomatic, no distress noted.

## 2018-03-04 NOTE — PROGRESS NOTE ADULT - PROBLEM SELECTOR PLAN 4
-Labetalol 300mg TID  -Still elevated. Given that patient's HR is in the 60s, will not changed to different beta blocker.  BP still elevated with increased Nifedipine to 60XL. Will review s/p lasix x1.

## 2018-03-04 NOTE — PROGRESS NOTE ADULT - PROBLEM SELECTOR PLAN 6
-Heparin subq  -Dispo: As creatinine continues to improve, will schedule angiogram and recall vascular.  D/c to rehab

## 2018-03-04 NOTE — PROGRESS NOTE ADULT - SUBJECTIVE AND OBJECTIVE BOX
Progress Note    JAMES PATRICK 63y (1955) Male [id number]  02-12-18 (20d)    Dr. Galvez [facility] PGY1  Pager# [phone]    Chief Complaint: 63M PMH DM, HTN, CKD p/w L foot pain x 3 days.    Subjective:  No acute events overnight. Patient seen and examined at bedside. Overnight, patient had shortness of breath when laying down that was relieved by oxygen. Denies any cough, wheezing, nausea, vomiting, diarrhea, abdominal pain.    Review of Systems:  CONSTITUTIONAL: No fever, weight loss, or fatigue  EYES: +decreased vision at baseline  ENMT: No sore throat  NECK: No pain or stiffness  RESPIRATORY: +SOB. No cough, wheezing, chills or hemoptysis  CARDIOVASCULAR: No chest pain, palpitations, dizziness  GASTROINTESTINAL: No abdominal or epigastric pain. No nausea, vomiting, or hematemesis; No diarrhea or constipation. No melena or hematochezia.  GENITOURINARY: No dysuria, frequency, hematuria, or incontinence  NEUROLOGICAL: No headaches, memory loss, loss of strength, numbness, or tremors  SKIN: No new rashes  MUSCULOSKELETAL: +R BKA      PAST MEDICAL & SURGICAL HISTORY:  Kidney disease (N28.9)  HTN (hypertension) (I10)  DM (diabetes mellitus) (E11.9)  S/P BKA (below knee amputation) unilateral, right (Z89.511)  No significant past surgical history ([id number])    acetaminophen   Tablet 650 milliGRAM(s) Oral every 6 hours PRN  acetaminophen   Tablet. 650 milliGRAM(s) Oral every 6 hours PRN  atorvastatin 40 milliGRAM(s) Oral at bedtime  cyanocobalamin 1000 MICROGram(s) Oral daily  dextrose 5%. 1000 milliLiter(s) IV Continuous <Continuous>  dextrose 50% Injectable 12.5 Gram(s) IV Push once  dextrose 50% Injectable 25 Gram(s) IV Push once  dextrose 50% Injectable 25 Gram(s) IV Push once  dextrose Gel 1 Dose(s) Oral once PRN  furosemide   Injectable 60 milliGRAM(s) IV Push once  glucagon  Injectable 1 milliGRAM(s) IntraMuscular once PRN  heparin  Injectable 5000 Unit(s) SubCutaneous every 8 hours  influenza   Vaccine 0.5 milliLiter(s) IntraMuscular once  insulin lispro (HumaLOG) corrective regimen sliding scale   SubCutaneous three times a day before meals  labetalol 300 milliGRAM(s) Oral three times a day  lactobacillus acidophilus 1 Tablet(s) Oral daily  multivitamin 1 Tablet(s) Oral daily  NIFEdipine XL 60 milliGRAM(s) Oral daily  silver sulfADIAZINE 1% Cream 1 Application(s) Topical daily  sodium bicarbonate 1300 milliGRAM(s) Oral three times a day    Objective:  T(C): 36.8 (03-04-18 @ 05:55), Max: 36.8 (03-03-18 @ 13:25)  HR: 63 (03-04-18 @ 05:55) (58 - 63)  BP: 155/71 (03-04-18 @ 05:55) (141/60 - 158/68)  RR: 20 (03-04-18 @ 05:55) (18 - 20)  SpO2: 97% (03-04-18 @ 05:55) (92% - 97%)    Physical exam:  GENERAL: NAD, well-developed. +nasal cannula on  HEAD:  Atraumatic, Normocephalic  EYES: EOMI, PERRLA, conjunctiva and sclera clear  NECK: Supple, No JVD  CHEST/LUNG: Very dull bases b/l up to mid-back, R worse than L  HEART: Regular rate and rhythm; No murmurs, rubs, or gallops  ABDOMEN: Soft, Nontender, Nondistended; Bowel sounds present  EXTREMITIES:  L sole: improving. Skin is mostly intact. No new blisters noted. Sensation is very decreased. +R BKA with no overlying redness.   PSYCH: AAOx3  NEUROLOGY: non-focal  SKIN: See extremities exam.      03-03-18 @ 07:01  -  03-04-18 @ 07:00  --------------------------------------------------------  IN: 720 mL / OUT: 1000 mL / NET: -280 mL        CAPILLARY BLOOD GLUCOSE        03-04    144  |  105  |  68<H>  ----------------------------<  90  4.8   |  24  |  4.33<H>    Ca    8.5      04 Mar 2018 06:20          WBC Trend: 5.70<--, 6.34<--, 7.28<--    Hb Trend: 7.5<--, 7.6<--, 7.2<--, 8.0<--, 6.2<--        New imaging in last 24 hours: none  Consult notes reviewed: nephrology Progress Note    JAMES PATRICK 63y (1955) Male [id number]  02-12-18 (20d)    Dr. Galvez [facility] PGY1  Pager# [phone]    Chief Complaint: 63M PMH DM, HTN, CKD p/w L foot pain x 3 days.    Subjective:  No acute events overnight. Patient seen and examined at bedside. Overnight, patient had shortness of breath when laying down that was relieved by oxygen. Denies any cough, wheezing, nausea, vomiting, diarrhea, abdominal pain.    Review of Systems:  CONSTITUTIONAL: No fever, weight loss, or fatigue  EYES: +decreased vision at baseline  ENMT: No sore throat  NECK: No pain or stiffness  RESPIRATORY: +SOB. No cough, wheezing, chills or hemoptysis  CARDIOVASCULAR: No chest pain, palpitations, dizziness  GASTROINTESTINAL: No abdominal or epigastric pain. No nausea, vomiting, or hematemesis; No diarrhea or constipation. No melena or hematochezia.  GENITOURINARY: No dysuria, frequency, hematuria, or incontinence  NEUROLOGICAL: No headaches, memory loss, loss of strength, numbness, or tremors  SKIN: No new rashes  MUSCULOSKELETAL: +R BKA      PAST MEDICAL & SURGICAL HISTORY:  Kidney disease (N28.9)  HTN (hypertension) (I10)  DM (diabetes mellitus) (E11.9)  S/P BKA (below knee amputation) unilateral, right (Z89.511)  No significant past surgical history ([id number])    acetaminophen   Tablet 650 milliGRAM(s) Oral every 6 hours PRN  acetaminophen   Tablet. 650 milliGRAM(s) Oral every 6 hours PRN  atorvastatin 40 milliGRAM(s) Oral at bedtime  cyanocobalamin 1000 MICROGram(s) Oral daily  dextrose 5%. 1000 milliLiter(s) IV Continuous <Continuous>  dextrose 50% Injectable 12.5 Gram(s) IV Push once  dextrose 50% Injectable 25 Gram(s) IV Push once  dextrose 50% Injectable 25 Gram(s) IV Push once  dextrose Gel 1 Dose(s) Oral once PRN  furosemide   Injectable 60 milliGRAM(s) IV Push once  glucagon  Injectable 1 milliGRAM(s) IntraMuscular once PRN  heparin  Injectable 5000 Unit(s) SubCutaneous every 8 hours  influenza   Vaccine 0.5 milliLiter(s) IntraMuscular once  insulin lispro (HumaLOG) corrective regimen sliding scale   SubCutaneous three times a day before meals  labetalol 300 milliGRAM(s) Oral three times a day  lactobacillus acidophilus 1 Tablet(s) Oral daily  multivitamin 1 Tablet(s) Oral daily  NIFEdipine XL 60 milliGRAM(s) Oral daily  silver sulfADIAZINE 1% Cream 1 Application(s) Topical daily  sodium bicarbonate 1300 milliGRAM(s) Oral three times a day    Objective:  T(C): 36.8 (03-04-18 @ 05:55), Max: 36.8 (03-03-18 @ 13:25)  HR: 63 (03-04-18 @ 05:55) (58 - 63)  BP: 155/71 (03-04-18 @ 05:55) (141/60 - 158/68)  RR: 20 (03-04-18 @ 05:55) (18 - 20)  SpO2: 97% (03-04-18 @ 05:55) (92% - 97%)    Physical exam:  GENERAL: NAD, well-developed. +nasal cannula on  HEAD:  Atraumatic, Normocephalic  EYES: EOMI, PERRLA, conjunctiva and sclera clear  NECK: Supple, No JVD  CHEST/LUNG: Very dull bases b/l up to mid-back, R worse than L  HEART: Regular rate and rhythm; No murmurs, rubs, or gallops  ABDOMEN: Soft, Nontender, Nondistended; Bowel sounds present  EXTREMITIES: rt bka, left leg edema improving  PSYCH: AAOx3  NEUROLOGY: non-focal  SKIN: See extremities exam.      03-03-18 @ 07:01  -  03-04-18 @ 07:00  --------------------------------------------------------  IN: 720 mL / OUT: 1000 mL / NET: -280 mL        CAPILLARY BLOOD GLUCOSE        03-04    144  |  105  |  68<H>  ----------------------------<  90  4.8   |  24  |  4.33<H>    Ca    8.5      04 Mar 2018 06:20          WBC Trend: 5.70<--, 6.34<--, 7.28<--    Hb Trend: 7.5<--, 7.6<--, 7.2<--, 8.0<--, 6.2<--        New imaging in last 24 hours: none  Consult notes reviewed: nephrology

## 2018-03-05 DIAGNOSIS — J96.01 ACUTE RESPIRATORY FAILURE WITH HYPOXIA: ICD-10-CM

## 2018-03-05 DIAGNOSIS — R07.89 OTHER CHEST PAIN: ICD-10-CM

## 2018-03-05 LAB
BUN SERPL-MCNC: 70 MG/DL — HIGH (ref 7–23)
CALCIUM SERPL-MCNC: 8.5 MG/DL — SIGNIFICANT CHANGE UP (ref 8.4–10.5)
CHLORIDE SERPL-SCNC: 103 MMOL/L — SIGNIFICANT CHANGE UP (ref 98–107)
CK MB BLD-MCNC: 2.05 NG/ML — SIGNIFICANT CHANGE UP (ref 1–6.6)
CK MB BLD-MCNC: SIGNIFICANT CHANGE UP (ref 0–2.5)
CK SERPL-CCNC: 113 U/L — SIGNIFICANT CHANGE UP (ref 30–200)
CO2 SERPL-SCNC: 23 MMOL/L — SIGNIFICANT CHANGE UP (ref 22–31)
CREAT SERPL-MCNC: 4.43 MG/DL — HIGH (ref 0.5–1.3)
GLUCOSE BLDC GLUCOMTR-MCNC: 116 MG/DL — HIGH (ref 70–99)
GLUCOSE BLDC GLUCOMTR-MCNC: 138 MG/DL — HIGH (ref 70–99)
GLUCOSE SERPL-MCNC: 96 MG/DL — SIGNIFICANT CHANGE UP (ref 70–99)
HCT VFR BLD CALC: 23.6 % — LOW (ref 39–50)
HGB BLD-MCNC: 7.5 G/DL — LOW (ref 13–17)
MCHC RBC-ENTMCNC: 27.7 PG — SIGNIFICANT CHANGE UP (ref 27–34)
MCHC RBC-ENTMCNC: 31.8 % — LOW (ref 32–36)
MCV RBC AUTO: 87.1 FL — SIGNIFICANT CHANGE UP (ref 80–100)
NRBC # FLD: 0 — SIGNIFICANT CHANGE UP
PLATELET # BLD AUTO: 264 K/UL — SIGNIFICANT CHANGE UP (ref 150–400)
PMV BLD: 11.3 FL — SIGNIFICANT CHANGE UP (ref 7–13)
POTASSIUM SERPL-MCNC: 4.9 MMOL/L — SIGNIFICANT CHANGE UP (ref 3.5–5.3)
POTASSIUM SERPL-SCNC: 4.9 MMOL/L — SIGNIFICANT CHANGE UP (ref 3.5–5.3)
RBC # BLD: 2.71 M/UL — LOW (ref 4.2–5.8)
RBC # FLD: 14.1 % — SIGNIFICANT CHANGE UP (ref 10.3–14.5)
SODIUM SERPL-SCNC: 142 MMOL/L — SIGNIFICANT CHANGE UP (ref 135–145)
TROPONIN T SERPL-MCNC: 0.07 NG/ML — HIGH (ref 0–0.06)
TROPONIN T SERPL-MCNC: 0.11 NG/ML — HIGH (ref 0–0.06)
WBC # BLD: 4.78 K/UL — SIGNIFICANT CHANGE UP (ref 3.8–10.5)
WBC # FLD AUTO: 4.78 K/UL — SIGNIFICANT CHANGE UP (ref 3.8–10.5)

## 2018-03-05 PROCEDURE — 99232 SBSQ HOSP IP/OBS MODERATE 35: CPT

## 2018-03-05 PROCEDURE — 99233 SBSQ HOSP IP/OBS HIGH 50: CPT | Mod: GC

## 2018-03-05 PROCEDURE — 93010 ELECTROCARDIOGRAM REPORT: CPT

## 2018-03-05 RX ORDER — ASPIRIN/CALCIUM CARB/MAGNESIUM 324 MG
81 TABLET ORAL DAILY
Refills: 0 | Status: DISCONTINUED | OUTPATIENT
Start: 2018-03-05 | End: 2018-03-27

## 2018-03-05 RX ORDER — ATORVASTATIN CALCIUM 80 MG/1
80 TABLET, FILM COATED ORAL AT BEDTIME
Refills: 0 | Status: DISCONTINUED | OUTPATIENT
Start: 2018-03-05 | End: 2018-04-13

## 2018-03-05 RX ORDER — FUROSEMIDE 40 MG
60 TABLET ORAL
Refills: 0 | Status: DISCONTINUED | OUTPATIENT
Start: 2018-03-05 | End: 2018-03-07

## 2018-03-05 RX ADMIN — PREGABALIN 1000 MICROGRAM(S): 225 CAPSULE ORAL at 13:23

## 2018-03-05 RX ADMIN — Medication 1 TABLET(S): at 13:23

## 2018-03-05 RX ADMIN — Medication 1300 MILLIGRAM(S): at 17:13

## 2018-03-05 RX ADMIN — ATORVASTATIN CALCIUM 80 MILLIGRAM(S): 80 TABLET, FILM COATED ORAL at 21:40

## 2018-03-05 RX ADMIN — Medication 81 MILLIGRAM(S): at 13:23

## 2018-03-05 RX ADMIN — Medication 1 APPLICATION(S): at 13:23

## 2018-03-05 RX ADMIN — HEPARIN SODIUM 5000 UNIT(S): 5000 INJECTION INTRAVENOUS; SUBCUTANEOUS at 05:23

## 2018-03-05 RX ADMIN — Medication 60 MILLIGRAM(S): at 05:23

## 2018-03-05 RX ADMIN — HEPARIN SODIUM 5000 UNIT(S): 5000 INJECTION INTRAVENOUS; SUBCUTANEOUS at 21:40

## 2018-03-05 RX ADMIN — HEPARIN SODIUM 5000 UNIT(S): 5000 INJECTION INTRAVENOUS; SUBCUTANEOUS at 13:24

## 2018-03-05 RX ADMIN — Medication 60 MILLIGRAM(S): at 17:13

## 2018-03-05 RX ADMIN — Medication 300 MILLIGRAM(S): at 05:23

## 2018-03-05 RX ADMIN — Medication 1300 MILLIGRAM(S): at 05:23

## 2018-03-05 RX ADMIN — Medication 1300 MILLIGRAM(S): at 21:47

## 2018-03-05 NOTE — PROGRESS NOTE ADULT - PROBLEM SELECTOR PLAN 6
Pt on oral medications at home.   c/w ISS  Well controlled. Labetalol 300mg TID, Nifedipine 60XL.  BP finally controlled. However, now adding Lasix so will re-assess tomorrow for medication re-evaluation as patient's BP may have been elevated 2/2 fluid overload.

## 2018-03-05 NOTE — PROGRESS NOTE ADULT - PROBLEM SELECTOR PLAN 5
Labetalol 300mg TID, Nifedipine 60XL.  BP finally controlled. However, now adding Lasix so will re-assess tomorrow for medication re-evaluation as patient's BP may have been elevated 2/2 fluid overload. L foot with cellulitis with resulting sepsis which is now improved.  S/p vancomycin and zosyn. Completed Unasyn on 2/22.  Podiatry has evaluated patient and report good improvement. recs appreciated.   Surgery has been consulted and are following. Want an angiogram; scheduling for when creatinine is stable. Will d/w nephrology and vascular regarding timing.  -Wound culture growing Acinetobacter, corynebacterium, staph haemolyticus and staph aureus. Blood cx with NGTD.  -LLE xrays negative for free air; CT read negative for free air.

## 2018-03-05 NOTE — PROGRESS NOTE ADULT - PROBLEM SELECTOR PLAN 7
-Heparin subq  -Dispo: As creatinine continues to improve, will schedule angiogram and recall vascular.  D/c to rehab Pt on oral medications at home.   c/w ISS  Well controlled.

## 2018-03-05 NOTE — PROGRESS NOTE ADULT - PROBLEM SELECTOR PLAN 1
ATN on CKD stage III in the setting of sepsis. UA on admission with granular casts. Improving and trending down. 4.3 may be his new baseline creatinine.   In polyuric phase. Continuing to monitor urine outputs.   Clinically hypervolemic today given orthopnea. Will start on Lasix 60mg IV BID.   Immunofixation negative for monoclonal expansion.  Avoiding nephrotoxins and RCA.  Sodium bicarb TID with improving bicarb.

## 2018-03-05 NOTE — PROGRESS NOTE ADULT - PROBLEM SELECTOR PLAN 3
Hgb of 7.6 today, normocytic in nature, likely 2/2 in setting of decreased EPO production d/t worsening kidney function. Hemolysis less likely. T bili is WNL. Acute blood loss also less likely as no source.  Will continue to monitor Patient with worsening SOB and intermittent hypoxia 2/2 fluid overload.   Nasal canula in place.  Lasix 60mg IVP BID.  If respiratory distress continues, will consider BiPAP. Patient with worsening SOB and intermittent hypoxia 2/2 fluid overload from likely acute on chronic HFpEF  Nasal canula in place.  Lasix 60mg IVP BID.  If respiratory distress continues, will consider BiPAP.

## 2018-03-05 NOTE — PROGRESS NOTE ADULT - PROBLEM SELECTOR PLAN 4
L foot with cellulitis with resulting sepsis which is now improved.  S/p vancomycin and zosyn. Completed Unasyn on 2/22.  Podiatry has evaluated patient and report good improvement. recs appreciated.   Surgery has been consulted and are following. Want an angiogram; scheduling for when creatinine is stable. Will d/w nephrology and vascular regarding timing.  -Wound culture growing Acinetobacter, corynebacterium, staph haemolyticus and staph aureus. Blood cx with NGTD.  -LLE xrays negative for free air; CT read negative for free air. Hgb of 7.6 today, normocytic in nature, likely 2/2 in setting of decreased EPO production d/t worsening kidney function. Hemolysis less likely. T bili is WNL. Acute blood loss also less likely as no source.  Will continue to monitor

## 2018-03-05 NOTE — PROGRESS NOTE ADULT - SUBJECTIVE AND OBJECTIVE BOX
Progress Note    JAMES PATRICK 63y (1955) Male [id number]  02-12-18 (21d)    Dr. [facility] PGY1  Pager# [phone]    Chief Complaint: 63M PMH DM, HTN, CKD p/w L foot pain x 3 days.    Subjective:  No acute events overnight. Patient seen and examined at bedside. Patient had one hour of midsternal chest pain today not a/w SOB, although patient wanted the nasal canula overnight. Denies any n/v/d/abdominal pain, diaphoresis.    Review of Systems:  CONSTITUTIONAL: No fever, weight loss, or fatigue  EYES: No eye pain, visual disturbances, or discharge  ENMT:  No difficulty hearing, tinnitus, vertigo; No sinus or throat pain  NECK: No pain or stiffness  RESPIRATORY: No cough, wheezing, chills or hemoptysis; +SOB overnight  CARDIOVASCULAR: +CP. No palpitations  GASTROINTESTINAL: No abdominal or epigastric pain. No nausea, vomiting, or hematemesis; No diarrhea or constipation. No melena or hematochezia.  GENITOURINARY: No dysuria, frequency, hematuria, or incontinence  NEUROLOGICAL: No headaches, memory loss, loss of strength, numbness, or tremors  SKIN: No itching, burning, rashes, or lesions   MUSCULOSKELETAL: No joint pain or swelling; No muscle, back, or extremity pain      PAST MEDICAL & SURGICAL HISTORY:  Kidney disease (N28.9)  HTN (hypertension) (I10)  DM (diabetes mellitus) (E11.9)  S/P BKA (below knee amputation) unilateral, right (Z89.511)  No significant past surgical history ([id number])    acetaminophen   Tablet 650 milliGRAM(s) Oral every 6 hours PRN  acetaminophen   Tablet. 650 milliGRAM(s) Oral every 6 hours PRN  aspirin enteric coated 81 milliGRAM(s) Oral daily  atorvastatin 40 milliGRAM(s) Oral at bedtime  cyanocobalamin 1000 MICROGram(s) Oral daily  dextrose 5%. 1000 milliLiter(s) IV Continuous <Continuous>  dextrose 50% Injectable 12.5 Gram(s) IV Push once  dextrose 50% Injectable 25 Gram(s) IV Push once  dextrose 50% Injectable 25 Gram(s) IV Push once  dextrose Gel 1 Dose(s) Oral once PRN  furosemide   Injectable 60 milliGRAM(s) IV Push two times a day  glucagon  Injectable 1 milliGRAM(s) IntraMuscular once PRN  heparin  Injectable 5000 Unit(s) SubCutaneous every 8 hours  influenza   Vaccine 0.5 milliLiter(s) IntraMuscular once  insulin lispro (HumaLOG) corrective regimen sliding scale   SubCutaneous three times a day before meals  labetalol 300 milliGRAM(s) Oral three times a day  lactobacillus acidophilus 1 Tablet(s) Oral daily  multivitamin 1 Tablet(s) Oral daily  NIFEdipine XL 60 milliGRAM(s) Oral daily  silver sulfADIAZINE 1% Cream 1 Application(s) Topical daily  sodium bicarbonate 1300 milliGRAM(s) Oral three times a day    Objective:  T(C): 36.8 (03-05-18 @ 05:18), Max: 36.8 (03-05-18 @ 05:18)  HR: 62 (03-05-18 @ 05:18) (55 - 72)  BP: 133/52 (03-05-18 @ 05:18) (133/52 - 149/54)  RR: 18 (03-05-18 @ 05:18) (18 - 19)  SpO2: 94% (03-05-18 @ 05:18) (90% - 94%)    Physical exam:  GENERAL: NAD, well-developed  HEAD:  Atraumatic, Normocephalic  EYES: EOMI, PERRLA, conjunctiva and sclera clear  NECK: Supple, No JVD  CHEST/LUNG: Lungs with decreased dullness, now starting in lower middle back with some crackles b/l.  HEART: Regular rate and rhythm; No murmurs, rubs, or gallops  ABDOMEN: Soft, Nontender, Nondistended; Bowel sounds present  EXTREMITIES:  LLE: foot is improving. Scant discharge from wound. Pulses still unpalpable, however swelling much improved. R LOGAN.  PSYCH: AAOx3  SKIN: see extremities exam      03-04-18 @ 07:01  -  03-05-18 @ 07:00  --------------------------------------------------------  IN: 550 mL / OUT: 750 mL / NET: -200 mL        CAPILLARY BLOOD GLUCOSE      (03-05 @ 06:00)                      7.5  4.78 )-----------( 264                 23.6    Neutrophils = -- (--%)  Lymphocytes = -- (--%)  Eosinophils = -- (--%)  Basophils = -- (--%)  Monocytes = -- (--%)  Bands = --%    03-05    142  |  103  |  70<H>  ----------------------------<  96  4.9   |  23  |  4.43<H>    Ca    8.5      05 Mar 2018 06:00        CARDIAC MARKERS ( 05 Mar 2018 06:00 )  Trop 0.07 ng/mL<H> /  u/L / CKMB 2.05 ng/mL       WBC Trend: 4.78<--, 5.70<--, 6.34<--    Hb Trend: 7.5<--, 7.5<--, 7.6<--, 7.2<--, 8.0<--        New imaging in last 24 hours:  Consult notes reviewed:

## 2018-03-05 NOTE — PROGRESS NOTE ADULT - ATTENDING COMMENTS
Patient seen and examined. Currently, no complaints although some reported midsternal chest pain overnight. Patient denies any diaphoresis, N/V but does report some occasional orthopnea. Exam is significant for mild LLE swelling without much edema and scan bibasilar crackles. Labs this AM reveal mild troponemia, which is likely due to demand ischemia in the setting of what is likely acute on chronic HFpEF. EKG, personally reviewed, does not show any acute ST-T wave changes concerning for ischemia. We will attempt to diurese him more aggressively given decreased renal function and acute hypoxic respiratory failure, which is likely due to V/Q mismatch from pleural effusions. Plan for eventual angiogram of the LLE by vascular once renal function improved and cleared by nephrology. Will continue to trend troponins for now until peaked.     Further details of plan per resident note above

## 2018-03-05 NOTE — PROGRESS NOTE ADULT - PROBLEM SELECTOR PLAN 1
Pt. with JANY on CKD in the setting of infection and diarrhea. CKD in the setting of long-standing DM. Scr was 1.5 in 3/2017, increased to 2.30 in 7/2017. Scr on admission (2/12) was elevated at 3.81, which peaked to 6.3 on 2/23 and is stable at 4.43 today. Pt. with likely ATN. Monitor BMP and urine output. Avoid any potential nephrotoxins. Pt. at increased risk for radiocontrast nephropathy

## 2018-03-05 NOTE — PROGRESS NOTE ADULT - PROBLEM SELECTOR PLAN 2
EKG showing bradycardia, but no distinct worrying changes. Bradycardia likely 2/2 high beta blocker and CCB dosages. Pain resolved on its own. Troponin slightly elevated 0.07, but in setting of CKD. Will trend it.  C/w ASA and Statin.

## 2018-03-05 NOTE — PROGRESS NOTE ADULT - ASSESSMENT
63M PMH DM on januvia, FS 80s at home, HTN, CKD, R BKA in 2009 presents with 3 day history of worsening foot pain concerning for cellulitis, currently stable. ATN appearing to have stabilized. However, with CP and SOB overnight requiring oxygen.

## 2018-03-06 DIAGNOSIS — T14.8XXA OTHER INJURY OF UNSPECIFIED BODY REGION, INITIAL ENCOUNTER: ICD-10-CM

## 2018-03-06 LAB
BUN SERPL-MCNC: 66 MG/DL — HIGH (ref 7–23)
CALCIUM SERPL-MCNC: 8.5 MG/DL — SIGNIFICANT CHANGE UP (ref 8.4–10.5)
CHLORIDE SERPL-SCNC: 103 MMOL/L — SIGNIFICANT CHANGE UP (ref 98–107)
CO2 SERPL-SCNC: 25 MMOL/L — SIGNIFICANT CHANGE UP (ref 22–31)
CREAT SERPL-MCNC: 4.39 MG/DL — HIGH (ref 0.5–1.3)
GLUCOSE SERPL-MCNC: 85 MG/DL — SIGNIFICANT CHANGE UP (ref 70–99)
POTASSIUM SERPL-MCNC: 4.9 MMOL/L — SIGNIFICANT CHANGE UP (ref 3.5–5.3)
POTASSIUM SERPL-SCNC: 4.9 MMOL/L — SIGNIFICANT CHANGE UP (ref 3.5–5.3)
SODIUM SERPL-SCNC: 143 MMOL/L — SIGNIFICANT CHANGE UP (ref 135–145)
TROPONIN T SERPL-MCNC: 0.08 NG/ML — HIGH (ref 0–0.06)

## 2018-03-06 PROCEDURE — 99233 SBSQ HOSP IP/OBS HIGH 50: CPT | Mod: GC

## 2018-03-06 PROCEDURE — 99232 SBSQ HOSP IP/OBS MODERATE 35: CPT

## 2018-03-06 RX ADMIN — Medication 300 MILLIGRAM(S): at 21:46

## 2018-03-06 RX ADMIN — Medication 60 MILLIGRAM(S): at 18:00

## 2018-03-06 RX ADMIN — HEPARIN SODIUM 5000 UNIT(S): 5000 INJECTION INTRAVENOUS; SUBCUTANEOUS at 05:35

## 2018-03-06 RX ADMIN — Medication 1 APPLICATION(S): at 13:47

## 2018-03-06 RX ADMIN — Medication 1300 MILLIGRAM(S): at 05:35

## 2018-03-06 RX ADMIN — PREGABALIN 1000 MICROGRAM(S): 225 CAPSULE ORAL at 12:25

## 2018-03-06 RX ADMIN — Medication 60 MILLIGRAM(S): at 05:35

## 2018-03-06 RX ADMIN — Medication 1 TABLET(S): at 12:25

## 2018-03-06 RX ADMIN — HEPARIN SODIUM 5000 UNIT(S): 5000 INJECTION INTRAVENOUS; SUBCUTANEOUS at 13:55

## 2018-03-06 RX ADMIN — Medication 81 MILLIGRAM(S): at 12:25

## 2018-03-06 RX ADMIN — Medication 1300 MILLIGRAM(S): at 21:48

## 2018-03-06 RX ADMIN — ATORVASTATIN CALCIUM 80 MILLIGRAM(S): 80 TABLET, FILM COATED ORAL at 21:48

## 2018-03-06 RX ADMIN — HEPARIN SODIUM 5000 UNIT(S): 5000 INJECTION INTRAVENOUS; SUBCUTANEOUS at 21:47

## 2018-03-06 RX ADMIN — Medication 300 MILLIGRAM(S): at 05:35

## 2018-03-06 RX ADMIN — Medication 1300 MILLIGRAM(S): at 13:55

## 2018-03-06 NOTE — PROGRESS NOTE ADULT - SUBJECTIVE AND OBJECTIVE BOX
Patient is a 63y old  Male who presents with a chief complaint of 63M PMH DM, HTN, CKD p/w L foot pain x 3 days. (19 Feb 2018 10:39)       INTERVAL HPI/OVERNIGHT EVENTS:  Patient seen and evaluated at bedside.  Pt is resting comfortable in NAD. Denies N/V/F/C.  Pain rated at X/10    Allergies    No Known Allergies    Intolerances        Vital Signs Last 24 Hrs  T(C): 36.8 (06 Mar 2018 05:00), Max: 37.1 (05 Mar 2018 13:19)  T(F): 98.2 (06 Mar 2018 05:00), Max: 98.7 (05 Mar 2018 13:19)  HR: 66 (06 Mar 2018 05:40) (55 - 66)  BP: 149/62 (06 Mar 2018 05:40) (128/59 - 149/62)  BP(mean): --  RR: 18 (06 Mar 2018 05:40) (18 - 18)  SpO2: 94% (06 Mar 2018 05:40) (94% - 97%)    LABS:                        7.5    4.78  )-----------( 264      ( 05 Mar 2018 06:00 )             23.6     03-06    143  |  103  |  66<H>  ----------------------------<  85  4.9   |  25  |  4.39<H>    Ca    8.5      06 Mar 2018 04:25          CAPILLARY BLOOD GLUCOSE      POCT Blood Glucose.: 105 mg/dL (06 Mar 2018 08:11)  POCT Blood Glucose.: 133 mg/dL (05 Mar 2018 22:39)  POCT Blood Glucose.: 117 mg/dL (05 Mar 2018 17:17)  POCT Blood Glucose.: 138 mg/dL (05 Mar 2018 12:24)      Lower Extremity Physical Exam:  Vasular: DP/PT 0/4, B/L, CFT <2 seconds B/L, Temperature gradient warm, B/L.   Neuro: Epicritic sensation absent to the level of toes, B/L.  Musculoskeletal/Ortho: RIGHT BKA.  Skin: Left foot deroofed blister with cherry red non-blanchable trophic changes to plantar aspect of toes 1-5, forefoot, midfoot, no ascending erythema or swelling, no malodor, no purulence, no deep probe, etiology unknown, ROM of toes intact, CFT to each toes normal, no sinus tract, no undermining. Overall appearance of foot continues to improve.    RADIOLOGY & ADDITIONAL TESTS:

## 2018-03-06 NOTE — PROGRESS NOTE ADULT - SUBJECTIVE AND OBJECTIVE BOX
Progress Note    JAMES PATRICK 63y (1955) Male [id number]  02-12-18 (22d)    DORON Thibodeaux PGY1  Pager# [phone]    Chief Complaint: 63M PMH DM, HTN, CKD p/w L foot pain x 3 days.    Subjective:  No acute events overnight. Patient seen and examined at bedside. Patient denies any new or continued chest pain. Slept with oxygen, but denies any SOB and removed the oxygen during the interview. No nausea, vomiting, diarrhea, abdominal pain. patient still urinating. Cough much improved.     Review of Systems:  CONSTITUTIONAL: No fever, weight loss, or fatigue  EYES: +decreased vision at baseline b/l  ENMT:  No difficulty hearing, tinnitus, vertigo; No sinus or throat pain  NECK: No pain or stiffness  RESPIRATORY: No cough, wheezing, chills or hemoptysis; No shortness of breath  CARDIOVASCULAR: No chest pain, palpitations, dizziness, or leg swelling  GASTROINTESTINAL: No abdominal or epigastric pain. No nausea, vomiting, or hematemesis; No diarrhea or constipation. No melena or hematochezia.  GENITOURINARY: No dysuria, frequency, hematuria, or incontinence  NEUROLOGICAL: No headaches, memory loss, loss of strength, numbness, or tremors  SKIN: +wound on L foot  MUSCULOSKELETAL: No joint pain or swelling; No muscle, back, or extremity pain      PAST MEDICAL & SURGICAL HISTORY:  Kidney disease (N28.9)  HTN (hypertension) (I10)  DM (diabetes mellitus) (E11.9)  S/P BKA (below knee amputation) unilateral, right (Z89.511)  No significant past surgical history ([id number])    acetaminophen   Tablet 650 milliGRAM(s) Oral every 6 hours PRN  acetaminophen   Tablet. 650 milliGRAM(s) Oral every 6 hours PRN  aspirin enteric coated 81 milliGRAM(s) Oral daily  atorvastatin 80 milliGRAM(s) Oral at bedtime  cyanocobalamin 1000 MICROGram(s) Oral daily  dextrose 5%. 1000 milliLiter(s) IV Continuous <Continuous>  dextrose 50% Injectable 12.5 Gram(s) IV Push once  dextrose 50% Injectable 25 Gram(s) IV Push once  dextrose 50% Injectable 25 Gram(s) IV Push once  dextrose Gel 1 Dose(s) Oral once PRN  furosemide   Injectable 60 milliGRAM(s) IV Push two times a day  glucagon  Injectable 1 milliGRAM(s) IntraMuscular once PRN  heparin  Injectable 5000 Unit(s) SubCutaneous every 8 hours  influenza   Vaccine 0.5 milliLiter(s) IntraMuscular once  insulin lispro (HumaLOG) corrective regimen sliding scale   SubCutaneous three times a day before meals  labetalol 300 milliGRAM(s) Oral three times a day  lactobacillus acidophilus 1 Tablet(s) Oral daily  multivitamin 1 Tablet(s) Oral daily  NIFEdipine XL 60 milliGRAM(s) Oral daily  silver sulfADIAZINE 1% Cream 1 Application(s) Topical daily  sodium bicarbonate 1300 milliGRAM(s) Oral three times a day    Objective:  T(C): 36.8 (03-06-18 @ 05:00), Max: 37.1 (03-05-18 @ 13:19)  HR: 66 (03-06-18 @ 05:40) (55 - 66)  BP: 149/62 (03-06-18 @ 05:40) (128/59 - 149/62)  RR: 18 (03-06-18 @ 05:40) (18 - 18)  SpO2: 94% (03-06-18 @ 05:40) (94% - 97%)    Physical exam:  GENERAL: NAD, well-developed  HEAD:  Atraumatic, Normocephalic  EYES: EOMI, conjunctiva and sclera clear  NECK: Supple, No JVD  CHEST/LUNG: Lungs sound much better to auscultation. still dull at the bases with scant crackles, but mid-posterior lung to base sound clearer. No wheezes auscultated.  HEART: Regular rate and rhythm; No murmurs, rubs, or gallops  ABDOMEN: Soft, Nontender, Nondistended; Bowel sounds present  EXTREMITIES: LLE wound: some granulation oozing on bandages. Skin is growing over wound. Mild erythema, but nothing surrounding. Leg edema is much improved. R BKA with no overlying skin changes.   PSYCH: AAOx3  NEUROLOGY: non-focal  SKIN: See extremities exam    03-05-18 @ 07:01  -  03-06-18 @ 07:00  --------------------------------------------------------  IN: 0 mL / OUT: 1200 mL / NET: -1200 mL        CAPILLARY BLOOD GLUCOSE      (03-05 @ 06:00)                      7.5  4.78 )-----------( 264                 23.6    Neutrophils = -- (--%)  Lymphocytes = -- (--%)  Eosinophils = -- (--%)  Basophils = -- (--%)  Monocytes = -- (--%)  Bands = --%    03-05    142  |  103  |  70<H>  ----------------------------<  96  4.9   |  23  |  4.43<H>    Ca    8.5      05 Mar 2018 06:00        CARDIAC MARKERS ( 05 Mar 2018 13:40 )  Trop 0.11 ng/mL<H> / CK x     / CKMB x       CARDIAC MARKERS ( 05 Mar 2018 06:00 )  Trop 0.07 ng/mL<H> /  u/L / CKMB 2.05 ng/mL     WBC Trend: 4.78<--, 5.70<--, 6.34<--    Hb Trend: 7.5<--, 7.5<--, 7.6<--, 7.2<--, 8.0<--        New imaging in last 24 hours: none  Consult notes reviewed: nephrology Progress Note    JAMES PATRICK 63y (1955) Male [id number]  02-[birth year]-18 (22d)    DORON Thibodeaux PGY1  Pager# [phone]    Chief Complaint: 63M PMH DM, HTN, CKD p/w L foot pain x 3 days.    Subjective:  No acute events overnight. Patient seen and examined at bedside. Patient denies any new or continued chest pain. Slept with oxygen, but denies any SOB and removed the oxygen during the interview. No nausea, vomiting, diarrhea, abdominal pain. patient still urinating. Cough much improved.     Review of Systems:  CONSTITUTIONAL: No fever, weight loss, or fatigue  EYES: +decreased vision at baseline b/l  ENMT:  No difficulty hearing, tinnitus, vertigo; No sinus or throat pain  NECK: No pain or stiffness  RESPIRATORY: No cough, wheezing, chills or hemoptysis; No shortness of breath  CARDIOVASCULAR: No chest pain, palpitations, dizziness, or leg swelling  GASTROINTESTINAL: No abdominal or epigastric pain. No nausea, vomiting, or hematemesis; No diarrhea or constipation. No melena or hematochezia.  GENITOURINARY: No dysuria, frequency, hematuria, or incontinence  NEUROLOGICAL: No headaches, memory loss, loss of strength, numbness, or tremors  SKIN: +wound on L foot  MUSCULOSKELETAL: No joint pain or swelling; No muscle, back, or extremity pain    MEDICATIONS  (STANDING):  aspirin enteric coated 81 milliGRAM(s) Oral daily  atorvastatin 80 milliGRAM(s) Oral at bedtime  cyanocobalamin 1000 MICROGram(s) Oral daily  dextrose 5%. 1000 milliLiter(s) (50 mL/Hr) IV Continuous <Continuous>  dextrose 50% Injectable 12.5 Gram(s) IV Push once  dextrose 50% Injectable 25 Gram(s) IV Push once  dextrose 50% Injectable 25 Gram(s) IV Push once  furosemide   Injectable 60 milliGRAM(s) IV Push two times a day  heparin  Injectable 5000 Unit(s) SubCutaneous every 8 hours  influenza   Vaccine 0.5 milliLiter(s) IntraMuscular once  insulin lispro (HumaLOG) corrective regimen sliding scale   SubCutaneous three times a day before meals  labetalol 300 milliGRAM(s) Oral three times a day  lactobacillus acidophilus 1 Tablet(s) Oral daily  multivitamin 1 Tablet(s) Oral daily  NIFEdipine XL 60 milliGRAM(s) Oral daily  silver sulfADIAZINE 1% Cream 1 Application(s) Topical daily  sodium bicarbonate 1300 milliGRAM(s) Oral three times a day    MEDICATIONS  (PRN):  acetaminophen   Tablet 650 milliGRAM(s) Oral every 6 hours PRN For Temp greater than 38 C (100.4 F)  acetaminophen   Tablet. 650 milliGRAM(s) Oral every 6 hours PRN Moderate Pain (4 - 6)  dextrose Gel 1 Dose(s) Oral once PRN Blood Glucose LESS THAN 70 milliGRAM(s)/deciliter  glucagon  Injectable 1 milliGRAM(s) IntraMuscular once PRN Glucose LESS THAN 70 milligrams/deciliter        PAST MEDICAL & SURGICAL HISTORY:  Kidney disease (N28.9)  HTN (hypertension) (I10)  DM (diabetes mellitus) (E11.9)  S/P BKA (below knee amputation) unilateral, right (Z89.511)  No significant past surgical history ([id number])    Objective:  T(C): 36.8 (03-06-18 @ 05:00), Max: 37.1 (03-05-18 @ 13:19)  HR: 66 (03-06-18 @ 05:40) (55 - 66)  BP: 149/62 (03-06-18 @ 05:40) (128/59 - 149/62)  RR: 18 (03-06-18 @ 05:40) (18 - 18)  SpO2: 94% (03-06-18 @ 05:40) (94% - 97%)    Physical exam:  GENERAL: NAD, well-developed  HEAD:  Atraumatic, Normocephalic  EYES: EOMI, conjunctiva and sclera clear  NECK: Supple, No JVD  CHEST/LUNG: Lungs sound much better to auscultation. still dull at the bases with scant crackles, but mid-posterior lung to base sound clearer. No wheezes auscultated.  HEART: Regular rate and rhythm; No murmurs, rubs, or gallops  ABDOMEN: Soft, Nontender, Nondistended; Bowel sounds present  EXTREMITIES: LLE wound: some granulation oozing on bandages. Skin is growing over wound. Mild erythema, but nothing surrounding. Leg edema is much improved. R BKA with no overlying skin changes.   PSYCH: AAOx3  NEUROLOGY: non-focal  SKIN: See extremities exam    03-05-18 @ 07:01  -  03-06-18 @ 07:00  --------------------------------------------------------  IN: 0 mL / OUT: 1200 mL / NET: -1200 mL        CAPILLARY BLOOD GLUCOSE    LABS:  (03-05 @ 06:00)                      7.5  4.78 )-----------( 264                 23.6    Neutrophils = -- (--%)  Lymphocytes = -- (--%)  Eosinophils = -- (--%)  Basophils = -- (--%)  Monocytes = -- (--%)  Bands = --%    03-05    142  |  103  |  70<H>  ----------------------------<  96  4.9   |  23  |  4.43<H>    Ca    8.5      05 Mar 2018 06:00        CARDIAC MARKERS ( 05 Mar 2018 13:40 )  Trop 0.11 ng/mL<H> / CK x     / CKMB x       CARDIAC MARKERS ( 05 Mar 2018 06:00 )  Trop 0.07 ng/mL<H> /  u/L / CKMB 2.05 ng/mL     WBC Trend: 4.78<--, 5.70<--, 6.34<--    Hb Trend: 7.5<--, 7.5<--, 7.6<--, 7.2<--, 8.0<--        New imaging in last 24 hours: none  Consult notes reviewed: nephrology

## 2018-03-06 NOTE — PROGRESS NOTE ADULT - SUBJECTIVE AND OBJECTIVE BOX
[facility] DIVISION OF KIDNEY DISEASES AND HYPERTENSION --    HPI: 63-year-old male with PMH of HTN, DM, right BKA, and CKD admitted for left foot infection. As per review of labs on [place]/[employer], Scr was 1.51 in 3/2017. As per PMD's office, Scr checked in 7/2017 was 2.30. Labs on admission (2/12) showed elevated Scr of 3.8 which peaked to 6.3 on 2/23 and is stable at 4.39 today. Pt. resting in bed and denies SOB, CP, or N/V.     PAST HISTORY  --------------------------------------------------------------------------------  No significant changes to PMH, PSH, FHx, SHx, unless otherwise noted    ALLERGIES & MEDICATIONS  --------------------------------------------------------------------------------  Allergies    No Known Allergies    Standing Inpatient Medications  aspirin enteric coated 81 milliGRAM(s) Oral daily  atorvastatin 80 milliGRAM(s) Oral at bedtime  cyanocobalamin 1000 MICROGram(s) Oral daily  dextrose 5%. 1000 milliLiter(s) IV Continuous <Continuous>  dextrose 50% Injectable 12.5 Gram(s) IV Push once  dextrose 50% Injectable 25 Gram(s) IV Push once  dextrose 50% Injectable 25 Gram(s) IV Push once  furosemide   Injectable 60 milliGRAM(s) IV Push two times a day  heparin  Injectable 5000 Unit(s) SubCutaneous every 8 hours  influenza   Vaccine 0.5 milliLiter(s) IntraMuscular once  insulin lispro (HumaLOG) corrective regimen sliding scale   SubCutaneous three times a day before meals  labetalol 300 milliGRAM(s) Oral three times a day  lactobacillus acidophilus 1 Tablet(s) Oral daily  multivitamin 1 Tablet(s) Oral daily  NIFEdipine XL 60 milliGRAM(s) Oral daily  silver sulfADIAZINE 1% Cream 1 Application(s) Topical daily  sodium bicarbonate 1300 milliGRAM(s) Oral three times a day    REVIEW OF SYSTEMS  --------------------------------------------------------------------------------  CVS: no chest pain  RESP: no SOB  ABD: no abdominal pain  : no dysuria  MSK: Left leg edema/foot infection    VITALS/PHYSICAL EXAM  --------------------------------------------------------------------------------  T(C): 36.8 (03-06-18 @ 05:00), Max: 37.1 (03-05-18 @ 13:19)  HR: 66 (03-06-18 @ 05:40) (55 - 66)  BP: 149/62 (03-06-18 @ 05:40) (128/59 - 149/62)  RR: 18 (03-06-18 @ 05:40) (18 - 18)  SpO2: 94% (03-06-18 @ 05:40) (94% - 97%)  Wt(kg): --    03-05-18 @ 07:01  -  03-06-18 @ 07:00  --------------------------------------------------------  IN: 0 mL / OUT: 1200 mL / NET: -1200 mL    Physical Exam:  	Gen: Resting in bed   	HEENT: No JVD  	Pulm: CTA B/L  	CV: S1S2+  	Abd: soft  	LE: Right BKA, LLE edema present, left foot dressing seen   	Neuro: Awake and alert   	Psych: Normal affect and mood  	Skin: Warm    LABS/STUDIES  --------------------------------------------------------------------------------              7.5    4.78  >-----------<  264      [03-05-18 @ 06:00]              23.6     143  |  103  |  66  ----------------------------<  85      [03-06-18 @ 04:25]  4.9   |  25  |  4.39        Ca     8.5     [03-06-18 @ 04:25]    Creatinine Trend:  SCr 4.39 [03-06 @ 04:25]  SCr 4.43 [03-05 @ 06:00]  SCr 4.33 [03-04 @ 06:20]  SCr 4.32 [03-03 @ 06:30]  SCr 4.34 [03-02 @ 05:30]

## 2018-03-06 NOTE — PROGRESS NOTE ADULT - PROBLEM SELECTOR PLAN 3
Patient with worsening SOB and intermittent hypoxia 2/2 fluid overload from likely acute on chronic HFpEF  Lasix 60mg IVP BID improving lung findings. Will monitor off nasal canula.  If respiratory distress continues, will consider BiPAP. 1/security/safe

## 2018-03-06 NOTE — PROGRESS NOTE ADULT - ASSESSMENT
63M PMH DM on januvia, FS 80s at home, HTN, CKD, R BKA in 2009 presented with cellulitis and sepsis that leg to ATN. Creatinine function seemed to have stabilized but with CP yesterday without ST elevations/depressions on EKG with troponemia which could have been demand ischemia, currently resolved. Started patient on Lasix for fluid overload with improvement of leg swelling and lung sounds.

## 2018-03-06 NOTE — PROGRESS NOTE ADULT - PROBLEM SELECTOR PLAN 4
Hgb of 7.6 today, normocytic in nature, likely 2/2 in setting of decreased EPO production d/t worsening kidney function. Hemolysis less likely. T bili is WNL. Acute blood loss also less likely as no source.  Will continue to monitor L foot with cellulitis with resulting sepsis which is now improved since admission  S/p vancomycin and zosyn. Completed Unasyn on 2/22.  Podiatry has evaluated patient and report good improvement. recs appreciated.   Surgery has been consulted and are following. Want an angiogram. D/w vascular and nephrology today. Nephrology indicate that patient is at a very high risk of contrast nephropathy, but this might be his new baseline renal function and this may be the time to obtain the angiogram. Spoke to vascular who will discuss, too.   - Wound culture growing Acinetobacter, corynebacterium, staph haemolyticus and staph aureus. Blood cx with NGTD.  LLE xrays negative for free air; CT read negative for free air.

## 2018-03-06 NOTE — PROGRESS NOTE ADULT - PROBLEM SELECTOR PLAN 1
ATN on CKD stage III in the setting of sepsis. UA on admission with granular casts. Improving and trending down. 4.4 may be his new baseline creatinine.   In polyuric phase. Continuing to monitor urine outputs.   Hypervolemia clinically improving with Lasix 60mg IV BID.   Immunofixation negative for monoclonal expansion.  Avoiding nephrotoxins and RCA.  Sodium bicarb TID with improving bicarb.

## 2018-03-06 NOTE — PROGRESS NOTE ADULT - PROBLEM SELECTOR PLAN 2
EKG showing bradycardia, but no ST elevations or depressions. Bradycardia likely 2/2 high beta blocker and CCB dosages. Pain resolved on its own. Troponemic to 0.11 yesterday likely demand and in the setting of CKD.   C/w ASA and Statin. EKG showing bradycardia, but no ST elevations or depressions. Bradycardia likely 2/2 high beta blocker and CCB dosages. Pain resolved on its own. Troponemic to 0.11 yesterday likely demand and in the setting of CKD. Down to 0.8 today.  C/w ASA and Statin. EKG showing bradycardia, but no ST elevations or depressions. Bradycardia likely 2/2 high beta blocker and CCB dosages. Pain resolved on its own. Troponemic to 0.11 yesterday likely demand ischemia and in the setting of JANY on CKD3. Down to 0.8 today.  C/w ASA and Statin.

## 2018-03-06 NOTE — PROGRESS NOTE ADULT - PROBLEM SELECTOR PLAN 8
-Heparin subq  -Dispo: As creatinine continues to improve, will schedule angiogram and recall vascular.  D/c to rehab -Heparin subq  -Dispo: As creatinine continues to improve, will schedule angiogram and recall vascular.  D/c to rehab once medically stable

## 2018-03-06 NOTE — PROGRESS NOTE ADULT - PROBLEM SELECTOR PLAN 5
L foot with cellulitis with resulting sepsis which is now improved.  S/p vancomycin and zosyn. Completed Unasyn on 2/22.  Podiatry has evaluated patient and report good improvement. recs appreciated.   Surgery has been consulted and are following. Want an angiogram; scheduling for when creatinine is stable. Will d/w nephrology and vascular regarding timing.  -Wound culture growing Acinetobacter, corynebacterium, staph haemolyticus and staph aureus. Blood cx with NGTD.  -LLE xrays negative for free air; CT read negative for free air. L foot with cellulitis with resulting sepsis which is now improved.  S/p vancomycin and zosyn. Completed Unasyn on 2/22.  Podiatry has evaluated patient and report good improvement. recs appreciated.   Surgery has been consulted and are following. Want an angiogram. D/w vascular and nephrology today. Nephrology indicate that patient is at a very high risk of contrast nephropathy, but this might be his new baseline renal function and this may be the time to obtain the angiogram. Spoke to vascular who will discuss, too.     Wound culture growing Acinetobacter, corynebacterium, staph haemolyticus and staph aureus. Blood cx with NGTD.  LLE xrays negative for free air; CT read negative for free air. Hgb of 7.6 today, normocytic in nature, likely 2/2 in setting of decreased EPO production d/t worsening kidney function. Hemolysis less likely. T bili is WNL. Acute blood loss also less likely as no source.  Will continue to monitor

## 2018-03-06 NOTE — PROGRESS NOTE ADULT - PROBLEM SELECTOR PLAN 1
Pt. with JANY on CKD in the setting of infection and diarrhea. CKD in the setting of long-standing DM. Scr was 1.5 in 3/2017, increased to 2.30 in 7/2017. Scr on admission (2/12) was elevated at 3.81, which peaked to 6.3 on 2/23 and is stable at 4.39 today. Pt. with likely ATN. Monitor BMP and urine output. Avoid any potential nephrotoxins. Pt. at increased risk for radiocontrast nephropathy

## 2018-03-06 NOTE — PROVIDER CONTACT NOTE (OTHER) - ASSESSMENT
bp 135/50 hr 65  due for labetalol, nifedipine, and lasix .  Patient is asymptomatic, no distress noted.

## 2018-03-06 NOTE — PROGRESS NOTE ADULT - ATTENDING COMMENTS
Patient seen and examined. Currently, reports SOB mildly improved since yesterday. Lung exam reveals poor inspiratory effort but LLE is markedly less swollen following diuresis with IV Lasix. SCr stable and as per nephrology, risk for LANNY remains should vascular proceed with LLE angiogram. Medically speaking the patient appears optimized should the procedure being warranted as an inpatient. In the meantime, we will continue diuresing patient for now and podiatry is assisting with wound care to the LLE, which is appreciated. Will call cards consult (Dr. Powell) for cardiac clearance once timing for angiogram revealed to us.     Further details of plan per resident note above

## 2018-03-06 NOTE — PROGRESS NOTE ADULT - PROBLEM SELECTOR PLAN 2
Acidosis in the setting of JANY and infection. Serum CO2 stable at 25 today. Pt. on oral sodium bicarbonate therapy. Monitor serum CO2

## 2018-03-06 NOTE — PROGRESS NOTE ADULT - ASSESSMENT
63M LEFT toes, forefoot, midfoot blister with cellulitis (improving with local care).  - Pt seen and evaluated  - Appearance of foot continues to improve  - Cont IV abx  - Silvadene to LEFT foot wound daily  - No pod sx intervention at this time  - F/u angio when renal status improves  - Seen w/ attending 63M LEFT toes, forefoot, midfoot blister with cellulitis (improving with local care).    Plan:  - Pt seen and evaluated  - Appearance of foot continues to improve  - Cont IV abx  - Silvadene to LEFT foot wound daily  - No pod sx intervention at this time  - F/u angio when renal status improves  - Seen w/ attending

## 2018-03-07 DIAGNOSIS — I73.9 PERIPHERAL VASCULAR DISEASE, UNSPECIFIED: ICD-10-CM

## 2018-03-07 LAB
BUN SERPL-MCNC: 71 MG/DL — HIGH (ref 7–23)
CALCIUM SERPL-MCNC: 8.7 MG/DL — SIGNIFICANT CHANGE UP (ref 8.4–10.5)
CHLORIDE SERPL-SCNC: 100 MMOL/L — SIGNIFICANT CHANGE UP (ref 98–107)
CO2 SERPL-SCNC: 26 MMOL/L — SIGNIFICANT CHANGE UP (ref 22–31)
CREAT SERPL-MCNC: 4.66 MG/DL — HIGH (ref 0.5–1.3)
GLUCOSE SERPL-MCNC: 84 MG/DL — SIGNIFICANT CHANGE UP (ref 70–99)
POTASSIUM SERPL-MCNC: 4.8 MMOL/L — SIGNIFICANT CHANGE UP (ref 3.5–5.3)
POTASSIUM SERPL-SCNC: 4.8 MMOL/L — SIGNIFICANT CHANGE UP (ref 3.5–5.3)
SODIUM SERPL-SCNC: 143 MMOL/L — SIGNIFICANT CHANGE UP (ref 135–145)

## 2018-03-07 PROCEDURE — 99232 SBSQ HOSP IP/OBS MODERATE 35: CPT

## 2018-03-07 PROCEDURE — 99233 SBSQ HOSP IP/OBS HIGH 50: CPT | Mod: GC

## 2018-03-07 PROCEDURE — 99232 SBSQ HOSP IP/OBS MODERATE 35: CPT | Mod: GC

## 2018-03-07 RX ORDER — SODIUM CHLORIDE 9 MG/ML
1000 INJECTION, SOLUTION INTRAVENOUS
Refills: 0 | Status: DISCONTINUED | OUTPATIENT
Start: 2018-03-07 | End: 2018-03-08

## 2018-03-07 RX ADMIN — Medication 60 MILLIGRAM(S): at 05:24

## 2018-03-07 RX ADMIN — HEPARIN SODIUM 5000 UNIT(S): 5000 INJECTION INTRAVENOUS; SUBCUTANEOUS at 05:26

## 2018-03-07 RX ADMIN — Medication 300 MILLIGRAM(S): at 21:57

## 2018-03-07 RX ADMIN — ATORVASTATIN CALCIUM 80 MILLIGRAM(S): 80 TABLET, FILM COATED ORAL at 21:57

## 2018-03-07 RX ADMIN — Medication 1 TABLET(S): at 12:41

## 2018-03-07 RX ADMIN — HEPARIN SODIUM 5000 UNIT(S): 5000 INJECTION INTRAVENOUS; SUBCUTANEOUS at 21:57

## 2018-03-07 RX ADMIN — Medication 60 MILLIGRAM(S): at 05:26

## 2018-03-07 RX ADMIN — Medication 81 MILLIGRAM(S): at 12:41

## 2018-03-07 RX ADMIN — Medication 1300 MILLIGRAM(S): at 05:26

## 2018-03-07 RX ADMIN — PREGABALIN 1000 MICROGRAM(S): 225 CAPSULE ORAL at 12:41

## 2018-03-07 RX ADMIN — Medication 1300 MILLIGRAM(S): at 21:57

## 2018-03-07 RX ADMIN — Medication 1300 MILLIGRAM(S): at 14:35

## 2018-03-07 RX ADMIN — HEPARIN SODIUM 5000 UNIT(S): 5000 INJECTION INTRAVENOUS; SUBCUTANEOUS at 14:35

## 2018-03-07 RX ADMIN — Medication 1 APPLICATION(S): at 12:41

## 2018-03-07 NOTE — PROGRESS NOTE ADULT - ASSESSMENT
62 y/o male with PMH of HTN, DM, right BKA and CKD admitted with left foot infection. Pt. now with resolving JANY. 62 y/o male with PMH of HTN, DM, right BKA and CKD admitted with left foot infection. Pt. now with JANY on CKD.

## 2018-03-07 NOTE — PROGRESS NOTE ADULT - PROBLEM SELECTOR PLAN 1
Pt. with JANY on CKD in the setting of infection and diarrhea. CKD in the setting of long-standing DM. Scr was 1.5 in 3/2017, increased to 2.30 in 7/2017. Scr on admission (2/12) was elevated at 3.81, which peaked to 6.3 on 2/23 and is stable at 4.66 today. Pt. with likely ATN. Monitor BMP and urine output. Avoid any potential nephrotoxins. Pt. at increased risk for radiocontrast nephropathy. Discussed risks with Pt and Medicine team at length at bedside Pt. with JANY on CKD in the setting of infection and diarrhea. CKD in the setting of long-standing DM. Scr was 1.5 in 3/2017, increased to 2.30 in 7/2017. Scr on admission (2/12) was elevated at 3.81, increased to 6.3 on 2/23. Pt. with likely ATN.Scr improved to 4.39 yesterday but has increased to 4.66 today.  Monitor BMP and urine output. Avoid any potential nephrotoxins. Pt. at increased risk for radiocontrast nephropathy. Pt. understands the risk of worsening renal function after contrast/angiogram study. Pt. also understands the need for dialysis after contrast/angiogram study. Monitor labs and urine output.

## 2018-03-07 NOTE — PROGRESS NOTE ADULT - PROBLEM SELECTOR PLAN 2
Acidosis in the setting of JANY and infection. Serum CO2 stable at 26 today. Pt. on oral sodium bicarbonate therapy. Monitor serum CO2 Acidosis in the setting of renal failure and infection. Serum CO2 stable at 26 today. Pt. on oral sodium bicarbonate therapy. Monitor serum CO2

## 2018-03-07 NOTE — PROGRESS NOTE ADULT - PROBLEM SELECTOR PLAN 8
-Heparin subq  -Dispo: As creatinine is stable (yet elevated), will schedule angiogram and recall vascular. Will need cardiac clearance with Dr. Powell. D/c to rehab once medically stable Pt on oral medications at home.   c/w ISS  Well controlled.

## 2018-03-07 NOTE — PROGRESS NOTE ADULT - PROBLEM SELECTOR PLAN 1
ATN on CKD stage III in the setting of sepsis. UA on admission with granular casts. Improving and trending down. 4.4 may be his new baseline creatinine.   In polyuric phase. Continuing to monitor urine outputs.   Hypervolemia clinically improving with Lasix 60mg IV BID.   Immunofixation negative for monoclonal expansion.  Avoiding nephrotoxins and RCA.  Sodium bicarb TID with improving bicarb. ATN on CKD stage III in the setting of sepsis. UA on admission with granular casts. Improving and trending down. 4.4 may be his new baseline creatinine with increase today, could be JANY due to BID Lasix. Will hold now as patient is clinically euvolemic.  In polyuric phase. Continuing to monitor urine outputs.    Immunofixation negative for monoclonal expansion.  Avoiding nephrotoxins and RCA.  Sodium bicarb TID with improving bicarb.

## 2018-03-07 NOTE — PROVIDER CONTACT NOTE (OTHER) - ASSESSMENT
bp 146/53 hr 62   due for labetalol, nifedipine, and lasix .  Patient is asymptomatic, no distress noted.

## 2018-03-07 NOTE — PROGRESS NOTE ADULT - ATTENDING COMMENTS
Patient seen and examined. Currently, no complaints. LLE wound remains stable but with poor overall healing per podiatry recs, which are appreciated. Patient's SCr has stabilized and is likely settled into a near end stage renal disease. I spoke with nephro attending, Dr. Daniels, who has recommended if angiogram of the LLE needs to be performed, it can be done but patient will be at risk of LANNY proceeding to ESRD on HD. I also explained this to vascular attending, Dr. Crump, who will tentatively plan procedure for tomorrow vs. Friday. I explained to patient the risks of going onto to HD vs. losing his LLE due to critical limb ischemia. He is aware of such risks and is okay to proceed with angiogram.     Further details of plan per resident note above

## 2018-03-07 NOTE — PROGRESS NOTE ADULT - SUBJECTIVE AND OBJECTIVE BOX
Progress Note    JAMES PATRICK 63y (1955) Male [id number]  [birth year]18 (23d)    DORON Thibodeaux PGY1  Pager# [phone]    Chief Complaint: 63M PMH DM, HTN, CKD p/w L foot pain x 3 days.    Subjective:  No acute events overnight. Patient seen and examined at bedside.    Review of Systems:  CONSTITUTIONAL: No fever, weight loss, or fatigue  EYES: No eye pain, visual disturbances, or discharge  ENMT:  No difficulty hearing, tinnitus, vertigo; No sinus or throat pain  NECK: No pain or stiffness  RESPIRATORY: No cough, wheezing, chills or hemoptysis; No shortness of breath  CARDIOVASCULAR: No chest pain, palpitations, dizziness, or leg swelling  GASTROINTESTINAL: No abdominal or epigastric pain. No nausea, vomiting, or hematemesis; No diarrhea or constipation. No melena or hematochezia.  GENITOURINARY: No dysuria, frequency, hematuria, or incontinence  NEUROLOGICAL: No headaches, memory loss, loss of strength, numbness, or tremors  SKIN: No itching, burning, rashes, or lesions   MUSCULOSKELETAL: No joint pain or swelling; No muscle, back, or extremity pain      PAST MEDICAL & SURGICAL HISTORY:  Kidney disease (N28.9)  HTN (hypertension) (I10)  DM (diabetes mellitus) (E11.9)  S/P BKA (below knee amputation) unilateral, right (Z89.511)  No significant past surgical history ([id number])    acetaminophen   Tablet 650 milliGRAM(s) Oral every 6 hours PRN  acetaminophen   Tablet. 650 milliGRAM(s) Oral every 6 hours PRN  aspirin enteric coated 81 milliGRAM(s) Oral daily  atorvastatin 80 milliGRAM(s) Oral at bedtime  cyanocobalamin 1000 MICROGram(s) Oral daily  dextrose 5%. 1000 milliLiter(s) IV Continuous <Continuous>  dextrose 50% Injectable 12.5 Gram(s) IV Push once  dextrose 50% Injectable 25 Gram(s) IV Push once  dextrose 50% Injectable 25 Gram(s) IV Push once  dextrose Gel 1 Dose(s) Oral once PRN  furosemide   Injectable 60 milliGRAM(s) IV Push two times a day  glucagon  Injectable 1 milliGRAM(s) IntraMuscular once PRN  heparin  Injectable 5000 Unit(s) SubCutaneous every 8 hours  influenza   Vaccine 0.5 milliLiter(s) IntraMuscular once  insulin lispro (HumaLOG) corrective regimen sliding scale   SubCutaneous three times a day before meals  labetalol 300 milliGRAM(s) Oral three times a day  lactobacillus acidophilus 1 Tablet(s) Oral daily  multivitamin 1 Tablet(s) Oral daily  NIFEdipine XL 60 milliGRAM(s) Oral daily  silver sulfADIAZINE 1% Cream 1 Application(s) Topical daily  sodium bicarbonate 1300 milliGRAM(s) Oral three times a day    Objective:  T(C): 37.2 (03-07-18 @ 05:12), Max: 37.2 (03-06-18 @ 21:08)  HR: 61 (03-07-18 @ 05:29) (54 - 65)  BP: 150/52 (03-07-18 @ 05:29) (135/59 - 159/60)  RR: 19 (03-07-18 @ 05:29) (18 - 19)  SpO2: 95% (03-07-18 @ 05:29) (91% - 99%)    Physical exam:  GENERAL: NAD, well-developed  HEAD:  Atraumatic, Normocephalic  EYES: EOMI, PERRLA, conjunctiva and sclera clear  NECK: Supple, No JVD  CHEST/LUNG: Clear to auscultation bilaterally; No wheeze  HEART: Regular rate and rhythm; No murmurs, rubs, or gallops  ABDOMEN: Soft, Nontender, Nondistended; Bowel sounds present  EXTREMITIES:  2+ Peripheral Pulses, No clubbing, cyanosis, or edema  PSYCH: AAOx3  NEUROLOGY: non-focal  SKIN: No rashes or lesions      03-05-18 @ 07:01  -  03-06-18 @ 07:00  --------------------------------------------------------  IN: 0 mL / OUT: 1200 mL / NET: -1200 mL    03-06-18 @ 07:01  -  03-07-18 @ 06:54  --------------------------------------------------------  IN: 960 mL / OUT: 1650 mL / NET: -690 mL        CAPILLARY BLOOD GLUCOSE        03-06    143  |  103  |  66<H>  ----------------------------<  85  4.9   |  25  |  4.39<H>    Ca    8.5      06 Mar 2018 04:25        CARDIAC MARKERS ( 06 Mar 2018 04:25 )  Trop 0.08 ng/mL<H> / CK x     / CKMB x       CARDIAC MARKERS ( 05 Mar 2018 13:40 )  Trop 0.11 ng/mL<H> / CK x     / CKMB x           RVP:          Tox:             WBC Trend: 4.78<--, 5.70<--, 6.34<--    Hb Trend: 7.5<--, 7.5<--, 7.6<--        New imaging in last 24 hours:  Consult notes reviewed: Progress Note    JAMES PATRICK 63y (1955) Male [id number]  [birth year]18 (23d)    Dr. Galvez [facility] PGY1  Pager# [phone]    Chief Complaint: 63M PMH DM, HTN, CKD p/w L foot pain x 3 days.    Subjective:  No acute events overnight. Patient seen and examined at bedside. Reports improvement of cough. Has been off nasal canula since yesterday. No nausea, vomiting, fevers, chills, abdominal pain.     Review of Systems:  CONSTITUTIONAL: No fever, weight loss, or fatigue  EYES: No eye pain, visual disturbances, or discharge  ENMT:  No difficulty hearing, tinnitus, vertigo; No sinus or throat pain  NECK: No pain or stiffness  RESPIRATORY: No cough, wheezing, chills or hemoptysis; No shortness of breath  CARDIOVASCULAR: No chest pain, palpitations, dizziness, or leg swelling  GASTROINTESTINAL: No abdominal or epigastric pain. No nausea, vomiting, or hematemesis; No diarrhea or constipation. No melena or hematochezia.  GENITOURINARY: No dysuria, frequency, hematuria, or incontinence  NEUROLOGICAL: No headaches, memory loss, loss of strength, numbness, or tremors  SKIN: +wound on L foot.  MUSCULOSKELETAL: No foot pain      PAST MEDICAL & SURGICAL HISTORY:  Kidney disease (N28.9)  HTN (hypertension) (I10)  DM (diabetes mellitus) (E11.9)  S/P BKA (below knee amputation) unilateral, right (Z89.511)  No significant past surgical history ([id number])    acetaminophen   Tablet 650 milliGRAM(s) Oral every 6 hours PRN  acetaminophen   Tablet. 650 milliGRAM(s) Oral every 6 hours PRN  aspirin enteric coated 81 milliGRAM(s) Oral daily  atorvastatin 80 milliGRAM(s) Oral at bedtime  cyanocobalamin 1000 MICROGram(s) Oral daily  dextrose 5%. 1000 milliLiter(s) IV Continuous <Continuous>  dextrose 50% Injectable 12.5 Gram(s) IV Push once  dextrose 50% Injectable 25 Gram(s) IV Push once  dextrose 50% Injectable 25 Gram(s) IV Push once  dextrose Gel 1 Dose(s) Oral once PRN  furosemide   Injectable 60 milliGRAM(s) IV Push two times a day  glucagon  Injectable 1 milliGRAM(s) IntraMuscular once PRN  heparin  Injectable 5000 Unit(s) SubCutaneous every 8 hours  influenza   Vaccine 0.5 milliLiter(s) IntraMuscular once  insulin lispro (HumaLOG) corrective regimen sliding scale   SubCutaneous three times a day before meals  labetalol 300 milliGRAM(s) Oral three times a day  lactobacillus acidophilus 1 Tablet(s) Oral daily  multivitamin 1 Tablet(s) Oral daily  NIFEdipine XL 60 milliGRAM(s) Oral daily  silver sulfADIAZINE 1% Cream 1 Application(s) Topical daily  sodium bicarbonate 1300 milliGRAM(s) Oral three times a day    Objective:  T(C): 37.2 (03-07-18 @ 05:12), Max: 37.2 (03-06-18 @ 21:08)  HR: 61 (03-07-18 @ 05:29) (54 - 65)  BP: 150/52 (03-07-18 @ 05:29) (135/59 - 159/60)  RR: 19 (03-07-18 @ 05:29) (18 - 19)  SpO2: 95% (03-07-18 @ 05:29) (91% - 99%)    Physical exam:  GENERAL: NAD, well-developed  HEAD:  Atraumatic, Normocephalic  EYES: EOMI, PERRLA, conjunctiva and sclera clear  NECK: Supple, No JVD  CHEST/LUNG: Lung sounds present down to bases with mild crackles in bases.   HEART: Regular rate and rhythm; No murmurs, rubs, or gallops  ABDOMEN: Soft, Nontender, Nondistended; Bowel sounds present  EXTREMITIES:  LLE: +granulation vs purulent drainage on overnight bandage. Small white pustule vs blister in mid foot. Patient has decreased sensation. R BKA: no overlying skin changes.   PSYCH: AAOx3  NEUROLOGY: See extremities exam  SKIN: see extremities exam      03-05-18 @ 07:01  -  03-06-18 @ 07:00  --------------------------------------------------------  IN: 0 mL / OUT: 1200 mL / NET: -1200 mL    03-06-18 @ 07:01  -  03-07-18 @ 06:54  --------------------------------------------------------  IN: 960 mL / OUT: 1650 mL / NET: -690 mL        CAPILLARY BLOOD GLUCOSE        03-06    143  |  103  |  66<H>  ----------------------------<  85  4.9   |  25  |  4.39<H>    Ca    8.5      06 Mar 2018 04:25        CARDIAC MARKERS ( 06 Mar 2018 04:25 )  Trop 0.08 ng/mL<H> / CK x     / CKMB x       CARDIAC MARKERS ( 05 Mar 2018 13:40 )  Trop 0.11 ng/mL<H> / CK x     / CKMB x           WBC Trend: 4.78<--, 5.70<--, 6.34<--    Hb Trend: 7.5<--, 7.5<--, 7.6<--        New imaging in last 24 hours: none  Consult notes reviewed: nephrology, podiatry

## 2018-03-07 NOTE — PROGRESS NOTE ADULT - PROBLEM SELECTOR PLAN 4
L foot with cellulitis with resulting sepsis which is now improved since admission  S/p vancomycin and zosyn. Completed Unasyn on 2/22.  Podiatry has evaluated patient and report good improvement. recs appreciated.   Surgery has been consulted and are following. Want an angiogram. D/w vascular and nephrology today. Nephrology indicate that patient is at a very high risk of contrast nephropathy, but this might be his new baseline renal function and this may be the time to obtain the angiogram. Spoke to vascular who will discuss, too.   - Wound culture growing Acinetobacter, corynebacterium, staph haemolyticus and staph aureus. Blood cx with NGTD.  LLE xrays negative for free air; CT read negative for free air. L foot with cellulitis with resulting sepsis which is now improved since admission  S/p vancomycin and zosyn. Completed Unasyn on 2/22.  Podiatry has evaluated patient and report good improvement. recs appreciated.   Surgery has been consulted and are following. Want an angiogram. D/w vascular and nephrology today. Nephrology indicate that patient is at a very high risk of contrast nephropathy, but this might be his new baseline renal function and this may be the time to obtain the angiogram. Spoke to vascular who will discuss, too.   Wound culture grew Acinetobacter, corynebacterium, staph haemolyticus and staph aureus. Blood cx with NGTD.  LLE xrays negative for free air; CT read negative for free air. L foot with cellulitis with resulting sepsis which is now improved since admission  S/p vancomycin and zosyn. Completed Unasyn on 2/22.  Podiatry has evaluated patient and report good improvement. recs appreciated.    Wound culture grew Acinetobacter, corynebacterium, staph haemolyticus and staph aureus. Blood cx with NGTD.  LLE xrays negative for free air; CT read negative for free air.

## 2018-03-07 NOTE — PROGRESS NOTE ADULT - ASSESSMENT
64yo M with DM and R BKA, now presenting with Diabetic foot soft tissue infection.       - Wound care as per podiatry  - pt is cleared by renal to proceed w lle angio [facility]   indications and risks and benefits  including the moderate risk of renal insuff progressing to renal failure  and dialysis d/w pt who understands and consents

## 2018-03-07 NOTE — PROGRESS NOTE ADULT - PROBLEM SELECTOR PLAN 5
Hgb of 7.6 today, normocytic in nature, likely 2/2 in setting of decreased EPO production d/t worsening kidney function. Hemolysis less likely. T bili is WNL. Acute blood loss also less likely as no source.  Will continue to monitor Hgb has been stable in the 7-8 range.   Likely 2/2 in setting of decreased EPO production d/t worsening kidney function. Hemolysis less likely. T bili is WNL. Acute blood loss also less likely as no source.  Will continue to monitor every 2 days Surgery has been consulted and are following. Want an angiogram. D/w vascular and nephrology today. Nephrology indicate that patient is at a very high risk of contrast nephropathy, but this might be his new baseline renal function and this may be the time to obtain the angiogram. Spoke to vascular who will discuss, too.  - C/w ASA and statin

## 2018-03-07 NOTE — CHART NOTE - NSCHARTNOTEFT_GEN_A_CORE
Patient is on schedule for angioplasty possible stent placement tomorrow at 11am.  NPO pMN  30cc/hr IVF while NPO  CBC, BMP coagas and type and screen for tomorrow.  Patient previously consented.    MERCEDEZ Ba MD PGYII [id number]

## 2018-03-07 NOTE — PROGRESS NOTE ADULT - SUBJECTIVE AND OBJECTIVE BOX
[facility] DIVISION OF KIDNEY DISEASES AND HYPERTENSION -- FOLLOW UP NOTE  --------------------------------------------------------------------------------    HPI: 63-year-old male with PMH of HTN, DM, right BKA, and CKD admitted for left foot infection. As per review of labs on [place]/[employer], Scr was 1.51 in 3/2017. As per PMD's office, Scr checked in 7/2017 was 2.30. Labs on admission (2/12) showed elevated Scr of 3.8 which peaked to 6.3 on 2/23 and is stable at 4.66 today. Patient seen and examined today. Pt admits to LE edema. Pt. denies SOB, CP or N/V.     PAST HISTORY  --------------------------------------------------------------------------------  No significant changes to PMH, PSH, FHx, SHx, unless otherwise noted    ALLERGIES & MEDICATIONS  --------------------------------------------------------------------------------  Allergies    No Known Allergies    Intolerances      Standing Inpatient Medications  aspirin enteric coated 81 milliGRAM(s) Oral daily  atorvastatin 80 milliGRAM(s) Oral at bedtime  cyanocobalamin 1000 MICROGram(s) Oral daily  dextrose 5%. 1000 milliLiter(s) IV Continuous <Continuous>  dextrose 50% Injectable 12.5 Gram(s) IV Push once  dextrose 50% Injectable 25 Gram(s) IV Push once  dextrose 50% Injectable 25 Gram(s) IV Push once  furosemide   Injectable 60 milliGRAM(s) IV Push two times a day  heparin  Injectable 5000 Unit(s) SubCutaneous every 8 hours  influenza   Vaccine 0.5 milliLiter(s) IntraMuscular once  insulin lispro (HumaLOG) corrective regimen sliding scale   SubCutaneous three times a day before meals  labetalol 300 milliGRAM(s) Oral three times a day  lactobacillus acidophilus 1 Tablet(s) Oral daily  multivitamin 1 Tablet(s) Oral daily  NIFEdipine XL 60 milliGRAM(s) Oral daily  silver sulfADIAZINE 1% Cream 1 Application(s) Topical daily  sodium bicarbonate 1300 milliGRAM(s) Oral three times a day    PRN Inpatient Medications  acetaminophen   Tablet 650 milliGRAM(s) Oral every 6 hours PRN  acetaminophen   Tablet. 650 milliGRAM(s) Oral every 6 hours PRN  dextrose Gel 1 Dose(s) Oral once PRN  glucagon  Injectable 1 milliGRAM(s) IntraMuscular once PRN      REVIEW OF SYSTEMS  --------------------------------------------------------------------------------  REVIEW OF SYSTEMS  --------------------------------------------------------------------------------  CVS: no chest pain  RESP: no SOB  ABD: no abdominal pain  : no dysuria  MSK: +Left leg edema/foot infection    VITALS/PHYSICAL EXAM  --------------------------------------------------------------------------------  T(C): 37.2 (03-07-18 @ 05:12), Max: 37.2 (03-06-18 @ 21:08)  HR: 61 (03-07-18 @ 05:29) (54 - 65)  BP: 150/52 (03-07-18 @ 05:29) (135/59 - 159/60)  RR: 19 (03-07-18 @ 05:29) (18 - 19)  SpO2: 95% (03-07-18 @ 05:29) (91% - 99%)  Wt(kg): --        03-06-18 @ 07:01  -  03-07-18 @ 07:00  --------------------------------------------------------  IN: 960 mL / OUT: 1650 mL / NET: -690 mL    Physical Exam:  	Gen: Resting in bed   	HEENT: No JVD  	Pulm: CTA B/L  	CV: S1S2+  	Abd: soft  	LE: Right BKA, LLE edema present, left foot dressing seen   	Neuro: Awake and alert   	Psych: Normal affect and mood  	Skin: Warm    LABS/STUDIES  --------------------------------------------------------------------------------    143  |  100  |  71  ----------------------------<  84      [03-07-18 @ 08:22]  4.8   |  26  |  4.66        Ca     8.7     [03-07-18 @ 08:22]    Troponin 0.08      [03-06-18 @ 04:25]    Creatinine Trend:  SCr 4.66 [03-07 @ 08:22]  SCr 4.39 [03-06 @ 04:25]  SCr 4.43 [03-05 @ 06:00]  SCr 4.33 [03-04 @ 06:20]  SCr 4.32 [03-03 @ 06:30]    Urinalysis - [02-25-18 @ 04:50]      Color PLYEL / Appearance CLEAR / SG 1.015 / pH 5.5      Gluc NEGATIVE / Ketone NEGATIVE  / Bili NEGATIVE / Urobili NORMAL       Blood TRACE / Protein 100 / Leuk Est TRACE / Nitrite NEGATIVE      RBC 0-2 / WBC 2-5 / Hyaline  / Gran  / Sq Epi OCC / Non Sq Epi  / Bacteria       Iron 21, TIBC 163, %sat --      [02-20-18 @ 05:00]  Ferritin 540.7      [02-20-18 @ 05:00]  HbA1c 5.9      [02-13-18 @ 07:11]      Free Light Chains: kappa 20.50, lambda 11.20, ratio = 1.83 H      [02-25 @ 07:43] Health system DIVISION OF KIDNEY DISEASES AND HYPERTENSION -- FOLLOW UP NOTE  --------------------------------------------------------------------------------  HPI: 63-year-old male with PMH of HTN, DM, right BKA, and CKD admitted for left foot infection. As per review of labs on [place]/[employer], Scr was 1.51 in 3/2017. As per PMD's office, Scr checked in 7/2017 was 2.30. Labs on admission (2/12) showed elevated Scr of 3.8 which peaked to 6.3 on 2/23 and is stable at 4.66 today. Patient seen and examined today. Pt admits to LE edema. Pt. denies SOB, CP or N/V.     PAST HISTORY  --------------------------------------------------------------------------------  No significant changes to PMH, PSH, FHx, SHx, unless otherwise noted    ALLERGIES & MEDICATIONS  --------------------------------------------------------------------------------  Allergies    No Known Allergies    Standing Inpatient Medications  aspirin enteric coated 81 milliGRAM(s) Oral daily  atorvastatin 80 milliGRAM(s) Oral at bedtime  cyanocobalamin 1000 MICROGram(s) Oral daily  dextrose 5%. 1000 milliLiter(s) IV Continuous <Continuous>  dextrose 50% Injectable 12.5 Gram(s) IV Push once  dextrose 50% Injectable 25 Gram(s) IV Push once  dextrose 50% Injectable 25 Gram(s) IV Push once  furosemide   Injectable 60 milliGRAM(s) IV Push two times a day  heparin  Injectable 5000 Unit(s) SubCutaneous every 8 hours  influenza   Vaccine 0.5 milliLiter(s) IntraMuscular once  insulin lispro (HumaLOG) corrective regimen sliding scale   SubCutaneous three times a day before meals  labetalol 300 milliGRAM(s) Oral three times a day  lactobacillus acidophilus 1 Tablet(s) Oral daily  multivitamin 1 Tablet(s) Oral daily  NIFEdipine XL 60 milliGRAM(s) Oral daily  silver sulfADIAZINE 1% Cream 1 Application(s) Topical daily  sodium bicarbonate 1300 milliGRAM(s) Oral three times a day    REVIEW OF SYSTEMS  --------------------------------------------------------------------------------  CVS: no chest pain  RESP: no SOB  ABD: no abdominal pain  : no dysuria  MSK: +Left leg edema/foot infection    VITALS/PHYSICAL EXAM  --------------------------------------------------------------------------------  T(C): 37.2 (03-07-18 @ 05:12), Max: 37.2 (03-06-18 @ 21:08)  HR: 61 (03-07-18 @ 05:29) (54 - 65)  BP: 150/52 (03-07-18 @ 05:29) (135/59 - 159/60)  RR: 19 (03-07-18 @ 05:29) (18 - 19)  SpO2: 95% (03-07-18 @ 05:29) (91% - 99%)  Wt(kg): --    03-06-18 @ 07:01  -  03-07-18 @ 07:00  --------------------------------------------------------  IN: 960 mL / OUT: 1650 mL / NET: -690 mL    Physical Exam:  	Gen: Resting in bed   	HEENT: No JVD  	Pulm: CTA B/L  	CV: S1S2+  	Abd: soft  	LE: Right BKA, LLE edema present, left foot dressing seen   	Neuro: Awake and alert   	Psych: Normal affect and mood  	Skin: Warm    LABS/STUDIES  --------------------------------------------------------------------------------    143  |  100  |  71  ----------------------------<  84      [03-07-18 @ 08:22]  4.8   |  26  |  4.66        Ca     8.7     [03-07-18 @ 08:22]    Troponin 0.08      [03-06-18 @ 04:25]    Creatinine Trend:  SCr 4.66 [03-07 @ 08:22]  SCr 4.39 [03-06 @ 04:25]  SCr 4.43 [03-05 @ 06:00]  SCr 4.33 [03-04 @ 06:20]  SCr 4.32 [03-03 @ 06:30]

## 2018-03-07 NOTE — PROGRESS NOTE ADULT - PROBLEM SELECTOR PLAN 2
EKG showing bradycardia, but no ST elevations or depressions. Bradycardia likely 2/2 high beta blocker and CCB dosages. Pain resolved on its own. Troponemic to 0.11 yesterday likely demand ischemia and in the setting of JANY on CKD3. Down to 0.8 today.  C/w ASA and Statin. Resolved  C/w ASA and Statin. Resolved; likely due to demand ischemia (troponins peaked)  C/w ASA and Statin

## 2018-03-07 NOTE — PROGRESS NOTE ADULT - PROBLEM SELECTOR PLAN 7
Pt on oral medications at home.   c/w ISS  Well controlled. Labetalol 300mg TID, Nifedipine 60XL, Lasix 60mg IV BID  BP finally controlled.

## 2018-03-07 NOTE — CHART NOTE - NSCHARTNOTEFT_GEN_A_CORE
Pre-operative Note    - Pre-operative Diagnosis: LLE Diabetic Foot Infection     - Procedure: LLE Angiogram, Possible Angioplasty, Possible Stent     - Labs:        143  |  100  |  71<H>  ----------------------------<  84  4.8   |  26  |  4.66<H>    Ca    8.7      07 Mar 2018 08:22        Type & Screen #1: 18  Type & Screen #2: ordered for 3/8/18    - CXR (18): Moderate right and small left pleural effusions which are   larger. Likely associated passive atelectasis. Underlying atelectasis of   other cause and/or pneumonia is not excluded in the appropriate clinical   context.    - EK18    - Blood: Not needed.     - TTE (3/2/18): EF 68%  1. Mitral annular calcification, otherwise normal mitral  valve. Mild mitral regurgitation.  2. Aortic valve leaflet morphology not well visualized.  Peak transaortic valve gradient equals 19 mm Hg, mean  transaortic valve gradient equals 11 mm Hg, consistent with  probable mild aortic stenosis. Minimal aortic  regurgitation.  3. Normal left ventricular internal dimensions and wall  thicknesses.  4. Endocardium not well visualized; grossly normal left  ventricular systolic function.  5. The right ventricle is not well visualized; grossly  normal right ventricular systolic function.  6. Estimated right ventricular systolic pressure equals 57  mm Hg, assuming right atrial pressure equals 10 mm Hg,  consistent with moderate pulmonary hypertension.  7. Bilateral pleural effusions.    - Orders:  > NPO at midnight  > IVF at midnight  > Perioperative antibiotics not needed.  > Morning Labs: CBC, BMP, coags, type & screen  > Diabetic orders adjusted for NPO period.    - Permits:  > Consent in chart.  > Case scheduled with OR.

## 2018-03-07 NOTE — PROGRESS NOTE ADULT - PROBLEM SELECTOR PLAN 9
-Heparin subq  -Dispo: As creatinine is stable (yet elevated), will schedule angiogram and recall vascular. Will need cardiac clearance with Dr. Powell. D/c to rehab once medically stable

## 2018-03-07 NOTE — PROGRESS NOTE ADULT - PROBLEM SELECTOR PLAN 3
Patient with worsening SOB and intermittent hypoxia 2/2 fluid overload from likely acute on chronic HFpEF  Lasix 60mg IVP BID improving lung findings. Will monitor off nasal canula.  If respiratory distress continues, will consider BiPAP. Patient with worsening SOB and intermittent hypoxia 2/2 fluid overload from likely acute on chronic HFpEF. Currently improved.  Lasix 60mg IVP BID improving lung findings. Satting well off nasal canula.  If respiratory distress continues, will consider BiPAP. Patient with worsening SOB and intermittent hypoxia 2/2 fluid overload from likely acute on chronic HFpEF. Currently improved.  Satting well off nasal canula.  If respiratory distress continues, will consider BiPAP.

## 2018-03-07 NOTE — CHART NOTE - NSCHARTNOTEFT_GEN_A_CORE
NUTRITION FOLLOW-UP:  Pt. reported by nursing with consistently good appetite/PO intake.  No issues chewing/swallowing or nausea/vomiting/diarrhea/constipation.      Weight: 165.1lbs [74.9kg] on 3/7/18    Edema:  None noted.    Skin: No noted pressure ulcers.      Pertinent Medications: MEDICATIONS  (STANDING):  aspirin enteric coated 81 milliGRAM(s) Oral daily  atorvastatin 80 milliGRAM(s) Oral at bedtime  cyanocobalamin 1000 MICROGram(s) Oral daily  dextrose 5%. 1000 milliLiter(s) (50 mL/Hr) IV Continuous <Continuous>  dextrose 50% Injectable 12.5 Gram(s) IV Push once  dextrose 50% Injectable 25 Gram(s) IV Push once  dextrose 50% Injectable 25 Gram(s) IV Push once  furosemide   Injectable 60 milliGRAM(s) IV Push two times a day  heparin  Injectable 5000 Unit(s) SubCutaneous every 8 hours  influenza   Vaccine 0.5 milliLiter(s) IntraMuscular once  insulin lispro (HumaLOG) corrective regimen sliding scale   SubCutaneous three times a day before meals  labetalol 300 milliGRAM(s) Oral three times a day  lactobacillus acidophilus 1 Tablet(s) Oral daily  multivitamin 1 Tablet(s) Oral daily  NIFEdipine XL 60 milliGRAM(s) Oral daily  silver sulfADIAZINE 1% Cream 1 Application(s) Topical daily  sodium bicarbonate 1300 milliGRAM(s) Oral three times a day    MEDICATIONS  (PRN):  acetaminophen   Tablet 650 milliGRAM(s) Oral every 6 hours PRN For Temp greater than 38 C (100.4 F)  acetaminophen   Tablet. 650 milliGRAM(s) Oral every 6 hours PRN Moderate Pain (4 - 6)  dextrose Gel 1 Dose(s) Oral once PRN Blood Glucose LESS THAN 70 milliGRAM(s)/deciliter  glucagon  Injectable 1 milliGRAM(s) IntraMuscular once PRN Glucose LESS THAN 70 milligrams/deciliter    Pertinent Labs:  03-07 Na143 mmol/L Glu 84 mg/dL K+ 4.8 mmol/L Cr  4.66 mg/dL<H> BUN 71 mg/dL<H> Phos n/a   Alb n/a   PAB n/a       CAPILLARY BLOOD GLUCOSE      POCT Blood Glucose.: 87 mg/dL (07 Mar 2018 08:25)  POCT Blood Glucose.: 110 mg/dL (06 Mar 2018 22:35)  POCT Blood Glucose.: 149 mg/dL (06 Mar 2018 17:09)  POCT Blood Glucose.: 145 mg/dL (06 Mar 2018 12:14)        Current Diet:  Consistent Carbohydrate, No Concentrated Potassium or phosphorus, Low Sodium + Prosource 1x daily (30mL)            PLAN/RECOMMENDATIONS:    1) Continue current diet.  2) Obtain daily weights  3) RDN remains available and will f/u PRN.          Indy Matthew RDN, CDN pager [phone]

## 2018-03-07 NOTE — PROGRESS NOTE ADULT - PROBLEM SELECTOR PLAN 6
Labetalol 300mg TID, Nifedipine 60XL, Lasix 60mg IV BID  BP finally controlled. Hgb has been stable in the 7-8 range.   Likely 2/2 in setting of decreased EPO production d/t worsening kidney function. Hemolysis less likely. T bili is WNL. Acute blood loss also less likely as no source.  Will continue to monitor every 2 days

## 2018-03-07 NOTE — PROGRESS NOTE ADULT - SUBJECTIVE AND OBJECTIVE BOX
Patient is a 63y old  Male who presents with a chief complaint of 63M PMH DM, HTN, CKD p/w L foot pain x 3 days. (19 Feb 2018 10:39)      Vascular Surgery Attending Progress Note    Interval HPI: pt wants to proceed w lle angio    Medications:  acetaminophen   Tablet 650 milliGRAM(s) Oral every 6 hours PRN  acetaminophen   Tablet. 650 milliGRAM(s) Oral every 6 hours PRN  aspirin enteric coated 81 milliGRAM(s) Oral daily  atorvastatin 80 milliGRAM(s) Oral at bedtime  cyanocobalamin 1000 MICROGram(s) Oral daily  dextrose 5%. 1000 milliLiter(s) IV Continuous <Continuous>  dextrose 50% Injectable 12.5 Gram(s) IV Push once  dextrose 50% Injectable 25 Gram(s) IV Push once  dextrose 50% Injectable 25 Gram(s) IV Push once  dextrose Gel 1 Dose(s) Oral once PRN  glucagon  Injectable 1 milliGRAM(s) IntraMuscular once PRN  heparin  Injectable 5000 Unit(s) SubCutaneous every 8 hours  influenza   Vaccine 0.5 milliLiter(s) IntraMuscular once  insulin lispro (HumaLOG) corrective regimen sliding scale   SubCutaneous three times a day before meals  labetalol 300 milliGRAM(s) Oral three times a day  lactobacillus acidophilus 1 Tablet(s) Oral daily  multivitamin 1 Tablet(s) Oral daily  NIFEdipine XL 60 milliGRAM(s) Oral daily  silver sulfADIAZINE 1% Cream 1 Application(s) Topical daily  sodium bicarbonate 1300 milliGRAM(s) Oral three times a day      Vital Signs Last 24 Hrs  T(C): 36.7 (07 Mar 2018 13:57), Max: 37.2 (06 Mar 2018 21:08)  T(F): 98 (07 Mar 2018 13:57), Max: 99 (06 Mar 2018 21:08)  HR: 55 (07 Mar 2018 13:57) (55 - 65)  BP: 128/54 (07 Mar 2018 13:57) (128/54 - 159/60)  BP(mean): --  RR: 19 (07 Mar 2018 13:57) (18 - 19)  SpO2: 100% (07 Mar 2018 13:57) (91% - 100%)  I&O's Summary    06 Mar 2018 07:01  -  07 Mar 2018 07:00  --------------------------------------------------------  IN: 960 mL / OUT: 1650 mL / NET: -690 mL    07 Mar 2018 07:01  -  07 Mar 2018 17:25  --------------------------------------------------------  IN: 720 mL / OUT: 350 mL / NET: 370 mL        Physical Exam:  Neuro  A&Ox3 VSS  Vascular:  left toe 1 ischemic   Musculoskeletal: wnl    LABS:    03-07    143  |  100  |  71<H>  ----------------------------<  84  4.8   |  26  |  4.66<H>    Ca    8.7      07 Mar 2018 08:22          BARI NICOLAS MD  [phone]

## 2018-03-08 LAB
APTT BLD: 33.3 SEC — SIGNIFICANT CHANGE UP (ref 27.5–37.4)
BLD GP AB SCN SERPL QL: NEGATIVE — SIGNIFICANT CHANGE UP
BUN SERPL-MCNC: 74 MG/DL — HIGH (ref 7–23)
CALCIUM SERPL-MCNC: 8.1 MG/DL — LOW (ref 8.4–10.5)
CHLORIDE SERPL-SCNC: 102 MMOL/L — SIGNIFICANT CHANGE UP (ref 98–107)
CO2 SERPL-SCNC: 27 MMOL/L — SIGNIFICANT CHANGE UP (ref 22–31)
CREAT SERPL-MCNC: 4.63 MG/DL — HIGH (ref 0.5–1.3)
FOLATE SERPL-MCNC: 9.8 NG/ML — SIGNIFICANT CHANGE UP (ref 4.7–20)
GLUCOSE SERPL-MCNC: 85 MG/DL — SIGNIFICANT CHANGE UP (ref 70–99)
HCT VFR BLD CALC: 23.6 % — LOW (ref 39–50)
HGB BLD-MCNC: 7.3 G/DL — LOW (ref 13–17)
INR BLD: 1.26 — HIGH (ref 0.88–1.17)
MCHC RBC-ENTMCNC: 26.6 PG — LOW (ref 27–34)
MCHC RBC-ENTMCNC: 30.9 % — LOW (ref 32–36)
MCV RBC AUTO: 86.1 FL — SIGNIFICANT CHANGE UP (ref 80–100)
NRBC # FLD: 0 — SIGNIFICANT CHANGE UP
PLATELET # BLD AUTO: 277 K/UL — SIGNIFICANT CHANGE UP (ref 150–400)
PMV BLD: 11.5 FL — SIGNIFICANT CHANGE UP (ref 7–13)
POTASSIUM SERPL-MCNC: 5 MMOL/L — SIGNIFICANT CHANGE UP (ref 3.5–5.3)
POTASSIUM SERPL-SCNC: 5 MMOL/L — SIGNIFICANT CHANGE UP (ref 3.5–5.3)
PROTHROM AB SERPL-ACNC: 14 SEC — HIGH (ref 9.8–13.1)
RBC # BLD: 2.74 M/UL — LOW (ref 4.2–5.8)
RBC # FLD: 13.9 % — SIGNIFICANT CHANGE UP (ref 10.3–14.5)
RH IG SCN BLD-IMP: POSITIVE — SIGNIFICANT CHANGE UP
SODIUM SERPL-SCNC: 144 MMOL/L — SIGNIFICANT CHANGE UP (ref 135–145)
TSH SERPL-MCNC: 3.22 UIU/ML — SIGNIFICANT CHANGE UP (ref 0.27–4.2)
VIT B12 SERPL-MCNC: 1362 PG/ML — HIGH (ref 200–900)
WBC # BLD: 4.15 K/UL — SIGNIFICANT CHANGE UP (ref 3.8–10.5)
WBC # FLD AUTO: 4.15 K/UL — SIGNIFICANT CHANGE UP (ref 3.8–10.5)

## 2018-03-08 PROCEDURE — 99222 1ST HOSP IP/OBS MODERATE 55: CPT

## 2018-03-08 PROCEDURE — 99232 SBSQ HOSP IP/OBS MODERATE 35: CPT | Mod: GC

## 2018-03-08 PROCEDURE — 75625 CONTRAST EXAM ABDOMINL AORTA: CPT | Mod: 26

## 2018-03-08 PROCEDURE — 99233 SBSQ HOSP IP/OBS HIGH 50: CPT | Mod: GC

## 2018-03-08 PROCEDURE — 75710 ARTERY X-RAYS ARM/LEG: CPT | Mod: 26,LT

## 2018-03-08 PROCEDURE — 36245 INS CATH ABD/L-EXT ART 1ST: CPT | Mod: LT

## 2018-03-08 RX ORDER — SODIUM CHLORIDE 9 MG/ML
1000 INJECTION INTRAMUSCULAR; INTRAVENOUS; SUBCUTANEOUS
Refills: 0 | Status: DISCONTINUED | OUTPATIENT
Start: 2018-03-08 | End: 2018-03-09

## 2018-03-08 RX ORDER — SODIUM CHLORIDE 9 MG/ML
1000 INJECTION INTRAMUSCULAR; INTRAVENOUS; SUBCUTANEOUS
Refills: 0 | Status: DISCONTINUED | OUTPATIENT
Start: 2018-03-08 | End: 2018-03-08

## 2018-03-08 RX ADMIN — SODIUM CHLORIDE 75 MILLILITER(S): 9 INJECTION INTRAMUSCULAR; INTRAVENOUS; SUBCUTANEOUS at 07:30

## 2018-03-08 RX ADMIN — Medication 60 MILLIGRAM(S): at 06:02

## 2018-03-08 RX ADMIN — PREGABALIN 1000 MICROGRAM(S): 225 CAPSULE ORAL at 13:54

## 2018-03-08 RX ADMIN — Medication 1300 MILLIGRAM(S): at 13:54

## 2018-03-08 RX ADMIN — Medication 81 MILLIGRAM(S): at 13:54

## 2018-03-08 RX ADMIN — Medication 650 MILLIGRAM(S): at 16:56

## 2018-03-08 RX ADMIN — HEPARIN SODIUM 5000 UNIT(S): 5000 INJECTION INTRAVENOUS; SUBCUTANEOUS at 21:34

## 2018-03-08 RX ADMIN — Medication 650 MILLIGRAM(S): at 17:53

## 2018-03-08 RX ADMIN — Medication 300 MILLIGRAM(S): at 06:02

## 2018-03-08 RX ADMIN — HEPARIN SODIUM 5000 UNIT(S): 5000 INJECTION INTRAVENOUS; SUBCUTANEOUS at 06:02

## 2018-03-08 RX ADMIN — HEPARIN SODIUM 5000 UNIT(S): 5000 INJECTION INTRAVENOUS; SUBCUTANEOUS at 14:09

## 2018-03-08 RX ADMIN — Medication 1300 MILLIGRAM(S): at 06:02

## 2018-03-08 RX ADMIN — ATORVASTATIN CALCIUM 80 MILLIGRAM(S): 80 TABLET, FILM COATED ORAL at 21:32

## 2018-03-08 RX ADMIN — Medication 1 APPLICATION(S): at 13:55

## 2018-03-08 RX ADMIN — Medication 1300 MILLIGRAM(S): at 21:34

## 2018-03-08 RX ADMIN — Medication 1 TABLET(S): at 13:54

## 2018-03-08 RX ADMIN — Medication 1 TABLET(S): at 13:53

## 2018-03-08 NOTE — PROGRESS NOTE ADULT - PROBLEM SELECTOR PROBLEM 6
Drop in hemoglobin Type 2 diabetes mellitus with complication, without long-term current use of insulin

## 2018-03-08 NOTE — PROGRESS NOTE ADULT - PROBLEM SELECTOR PLAN 1
ATN on CKD stage III in the setting of sepsis. 4.4 may be his new baseline creatinine. Patient scheduled for angiogram. Concerned for contrast induced nephropathy, but patient has been explained the risks and benefits. Appropriate pre-op hydration was initiated (based on PRESERVE trial).  Continuing to monitor urine outputs.

## 2018-03-08 NOTE — PROGRESS NOTE ADULT - ASSESSMENT
62 y/o male with PMH of HTN, DM, right BKA and CKD admitted with left foot infection. Pt. now with JANY on CKD.

## 2018-03-08 NOTE — PROGRESS NOTE ADULT - PROBLEM SELECTOR PLAN 9
-Heparin subq  -Dispo: Angiogram today. Then will re-assess for kidney function. Then rehab. -Heparin subq  -Dispo: Angiogram today. Then will re-assess for kidney function. Then rehab on Monday if creatinine stable post-angiogram.

## 2018-03-08 NOTE — PROGRESS NOTE ADULT - ASSESSMENT
63M PMH DM on januvia, FS 80s at home, HTN, CKD, R BKA in 2009 presented with cellulitis and sepsis that leg to ATN. Creatinine function seemed to have stabilized but with CP yesterday without ST elevations/depressions on EKG with troponemia which could have been demand ischemia, currently resolved. Started patient on Lasix for fluid overload with improvement of leg swelling and lung sounds. 63M PMH DM on januvia, FS 80s at home, HTN, CKD, R BKA in 2009 presented with cellulitis and sepsis that led to ATN. Creatinine function seemed to have stabilized but with CP yesterday without ST elevations/depressions on EKG with troponemia which could have been demand ischemia, currently resolved. Started patient on Lasix for fluid overload with improvement of leg swelling and lung sounds.

## 2018-03-08 NOTE — PROGRESS NOTE ADULT - PROBLEM SELECTOR PLAN 3
Patient with worsening SOB and intermittent hypoxia 2/2 fluid overload from likely acute on chronic HFpEF. Currently improved.  Satting well off nasal canula.  If respiratory distress continues, will consider BiPAP. For angiogram today. Will help with improvement of leg wound.   - C/w ASA and statin

## 2018-03-08 NOTE — CONSULT NOTE ADULT - ASSESSMENT
63yoM h/o DM on januvia and insulin approx 3x/week, CVA in 1990s, HTN, CKD stage 3, R BKA in 2009 p/w 3 day h/o worsening left foot pain. s/p angiogram today. Patient denies h/o ischemic heart disease. No h/o heart failure. TTE on 3/2/2019 reveals EF 68%. Normal left and right ventricular systolic function. Mild-Mod TR. Mod pulmonary HTN.    Functional status: 1 metabolic equivalent. RCRI: Pt has 3 RFs ~9.1% rate of MI, pulmonary edema, ventricular fibrillation, primary cardiac arrest, complete heart block.     Plan:  Would recommend pharmacologic  stress testing. 63yoM h/o DM on januvia and insulin approx 3x/week, CVA in 1990s, HTN, CKD stage 3, R BKA in 2009 p/w 3 day h/o worsening left foot pain. s/p  [id number] angiography and Left leg angiography today showing Ostial left anterior tibial:  100% stenosis. Proximal left anterior tibial: 100 % stenosis. Mid left anterior tibial: 100 % stenosis. Distal left anterior tibial: 100 % stenosis. 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  There is moderate diffuse small vessel dz of the entire foot.     Patient denies h/o ischemic heart disease. No h/o heart failure. TTE on 3/2/2019 reveals EF 68%. Normal left and right ventricular systolic function. Mild-Mod TR. Mod pulmonary HTN. Using a prosthetic, patient is able to walk 1 block before becoming SOB. Pt can walk up 5 steps before becoming SOB. Patient can eat and use the toilet by himself, but can no longer dress himself. Patient cannot do heavy house work nor participate in any type of sport. Patient has a home aide to help him at home 4 hours a day, 5 days a week. Patient denies SOB at rest, syncope, palpitations, dizziness, CP.     Functional status: 1 metabolic equivalent. RCRI: Pt has 3 RFs ~9.1% rate of MI, pulmonary edema, ventricular fibrillation, primary cardiac arrest, complete heart block.     Plan:  Would recommend pharmacologic  stress testing. If abnormal, coronary revasc. 63yoM h/o DM on januvia and insulin approx 3x/week, CVA in 1990s, HTN, CKD stage 3, R BKA in 2009 p/w 3 day h/o worsening left foot pain. s/p  [id number] angiography and Left leg angiography today showing Ostial left anterior tibial:  100% stenosis. Proximal left anterior tibial: 100 % stenosis. Mid left anterior tibial: 100 % stenosis. Distal left anterior tibial: 100 % stenosis. 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  There is moderate diffuse small vessel dz of the entire foot.     Patient denies h/o ischemic heart disease. No h/o heart failure. TTE on 3/2/2019 reveals EF 68%. Normal left and right ventricular systolic function. Mild-Mod TR. Mod pulmonary HTN. Using a prosthetic, patient is able to walk 1 block before becoming SOB. Pt can walk up 5 steps before becoming SOB. Patient can eat and use the toilet by himself, but can no longer dress himself. Patient cannot do heavy house work nor participate in any type of sport. Patient has a home aide to help him at home 4 hours a day, 5 days a week. Patient denies SOB at rest, syncope, palpitations, dizziness, CP.     Functional status: 1 metabolic equivalent. RCRI: Pt has 4 RFs ~9.1% rate of MI, pulmonary edema, ventricular fibrillation, primary cardiac arrest, complete heart block.     Plan:  Would recommend pharmacologic  stress testing. If abnormal, coronary revasc. 63yoM h/o DM on januvia and insulin approx 3x/week, CVA in 1990s, HTN, CKD stage 3, R BKA in 2009 p/w 3 day h/o worsening left foot pain. s/p  [id number] angiography and Left leg angiography today showing Ostial left anterior tibial:  100% stenosis. Proximal left anterior tibial: 100 % stenosis. Mid left anterior tibial: 100 % stenosis. Distal left anterior tibial: 100 % stenosis. 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  There is moderate diffuse small vessel dz of the entire foot.     Patient denies h/o ischemic heart disease. No h/o heart failure. TTE on 3/2/2019 reveals EF 68%. Normal left and right ventricular systolic function. Mild-Mod TR. Mild AS. Mod pulmonary HTN. Using a prosthetic, patient is able to walk 1 block before becoming SOB. Pt can walk up 5 steps before becoming SOB. Patient can eat and use the toilet by himself, but can no longer dress himself. Patient cannot do heavy house work nor participate in any type of sport. Patient has a home aide to help him at home 4 hours a day, 5 days a week. Patient denies SOB at rest, syncope, palpitations, dizziness, CP.     Functional status: 1 metabolic equivalent. RCRI: Pt has 3 RFs ~9.1% rate of MI, pulmonary edema, ventricular fibrillation, primary cardiac arrest, complete heart block.     Plan:  Given active left foot infection, recommend proceeding with femoral popliteal bypass. Patient is high risk for intermediate risk surgery. Patient is optimized from cardiac perspective. No further cardiac testing required at this time.

## 2018-03-08 NOTE — PROGRESS NOTE ADULT - PROBLEM SELECTOR PLAN 4
For angiogram today; should help with wound healing.    L foot with cellulitis with resulting sepsis which is now improved since admission  S/p vancomycin and zosyn. Completed Unasyn on 2/22.  Podiatry has evaluated patient and report good improvement. recs appreciated.    Wound culture grew Acinetobacter, corynebacterium, staph haemolyticus and staph aureus. Blood cx with NGTD.  LLE xrays negative for free air; CT read negative for free air. For angiogram today; should help with wound healing.  L foot with cellulitis with resulting sepsis which is now improved since admission  S/p vancomycin and zosyn. Completed Unasyn on 2/22.  Podiatry has evaluated patient and report good improvement. recs appreciated.    Wound culture grew Acinetobacter, corynebacterium, staph haemolyticus and staph aureus. Blood cx with NGTD.  LLE xrays negative for free air; CT read negative for free air. Hgb has been stable in the 7-8 range.   Folate, B12, TSH ordered.  Likely 2/2 in setting of decreased EPO production d/t worsening kidney function. Hemolysis less likely. T bili is WNL. Acute blood loss also less likely as no source.  Will continue to monitor every 2 days

## 2018-03-08 NOTE — PROGRESS NOTE ADULT - PROBLEM SELECTOR PLAN 7
Labetalol 300mg TID, Nifedipine 60XL.  BP finally controlled. c/w Labetalol 300mg TID, Nifedipine 60XL. -Heparin subq  -Dispo: Angiogram today.

## 2018-03-08 NOTE — CHART NOTE - NSCHARTNOTEFT_GEN_A_CORE
Vascular Note:    Pt s/p Left lower extremity angiogram. Patient has distal SFA disease with behind knee popliteal artery tight stenosis. His AT and peroneal are occluded. He has proximal PT stenosis but then good runoff to foot. Given extent of disease, he is not an endovascular candidate. Patient will need a Femoral-Popliteal artery bypass with PT patch angioplasty.     Recommendations:  1. Will obtain bilateral lower extremity vein mapping  2. Please optimize patient medically, cardiology if needed  3. Tentative plan for OR Monday, March 12, 2018

## 2018-03-08 NOTE — PROGRESS NOTE ADULT - SUBJECTIVE AND OBJECTIVE BOX
[facility] DIVISION OF KIDNEY DISEASES AND HYPERTENSION -- FOLLOW UP NOTE  --------------------------------------------------------------------------------    HPI: 63-year-old male with PMH of HTN, DM, right BKA, and CKD admitted for left foot infection. As per review of labs on [place]/[employer], Scr was 1.51 in 3/2017. As per PMD's office, Scr checked in 7/2017 was 2.30. Labs on admission (2/12) showed elevated Scr of 3.8 which peaked to 6.3 on 2/23 and is stable at 4.63 today. Patient seen and examined today. Pt planned for vascular study today. Pt. denies SOB, CP or N/V.     PAST HISTORY  --------------------------------------------------------------------------------  No significant changes to PMH, PSH, FHx, SHx, unless otherwise noted    ALLERGIES & MEDICATIONS  --------------------------------------------------------------------------------  Allergies    No Known Allergies    Intolerances      Standing Inpatient Medications  aspirin enteric coated 81 milliGRAM(s) Oral daily  atorvastatin 80 milliGRAM(s) Oral at bedtime  cyanocobalamin 1000 MICROGram(s) Oral daily  dextrose 5%. 1000 milliLiter(s) IV Continuous <Continuous>  dextrose 50% Injectable 12.5 Gram(s) IV Push once  dextrose 50% Injectable 25 Gram(s) IV Push once  dextrose 50% Injectable 25 Gram(s) IV Push once  heparin  Injectable 5000 Unit(s) SubCutaneous every 8 hours  influenza   Vaccine 0.5 milliLiter(s) IntraMuscular once  insulin lispro (HumaLOG) corrective regimen sliding scale   SubCutaneous three times a day before meals  labetalol 300 milliGRAM(s) Oral three times a day  lactobacillus acidophilus 1 Tablet(s) Oral daily  multivitamin 1 Tablet(s) Oral daily  NIFEdipine XL 60 milliGRAM(s) Oral daily  silver sulfADIAZINE 1% Cream 1 Application(s) Topical daily  sodium bicarbonate 1300 milliGRAM(s) Oral three times a day  sodium chloride 0.9%. 1000 milliLiter(s) IV Continuous <Continuous>    PRN Inpatient Medications  acetaminophen   Tablet 650 milliGRAM(s) Oral every 6 hours PRN  acetaminophen   Tablet. 650 milliGRAM(s) Oral every 6 hours PRN  dextrose Gel 1 Dose(s) Oral once PRN  glucagon  Injectable 1 milliGRAM(s) IntraMuscular once PRN      REVIEW OF SYSTEMS  --------------------------------------------------------------------------------  CVS: no chest pain  RESP: no SOB  ABD: no abdominal pain  : no dysuria  MSK: +Left leg edema/foot infection    VITALS/PHYSICAL EXAM  --------------------------------------------------------------------------------  T(C): 37.7 (03-08-18 @ 05:38), Max: 37.7 (03-08-18 @ 05:38)  HR: 66 (03-08-18 @ 05:38) (55 - 66)  BP: 150/66 (03-08-18 @ 05:38) (128/54 - 154/60)  RR: 18 (03-08-18 @ 05:38) (18 - 19)  SpO2: 95% (03-08-18 @ 05:38) (95% - 100%)  Wt(kg): --  Height (cm): 167.64 (03-08-18 @ 04:01)  Weight (kg): 74.9 (03-08-18 @ 04:01)  BMI (kg/m2): 26.7 (03-08-18 @ 04:01)  BSA (m2): 1.84 (03-08-18 @ 04:01)      03-07-18 @ 07:01  -  03-08-18 @ 07:00  --------------------------------------------------------  IN: 1270 mL / OUT: 850 mL / NET: 420 mL    Physical Exam:  	Gen: Resting in bed   	HEENT: No JVD  	Pulm: CTA B/L  	CV: S1S2+  	Abd: soft  	LE: Right BKA, LLE edema present, left foot dressing seen   	Neuro: Awake and alert   	Psych: Normal affect and mood  	Skin: Warm    LABS/STUDIES  --------------------------------------------------------------------------------              7.3    4.15  >-----------<  277      [03-08-18 @ 06:00]              23.6     144  |  102  |  74  ----------------------------<  85      [03-08-18 @ 06:00]  5.0   |  27  |  4.63        Ca     8.1     [03-08-18 @ 06:00]      PT/INR: PT 14.0 , INR 1.26       [03-08-18 @ 06:00]  PTT: 33.3       [03-08-18 @ 06:00]      Creatinine Trend:  SCr 4.63 [03-08 @ 06:00]  SCr 4.66 [03-07 @ 08:22]  SCr 4.39 [03-06 @ 04:25]  SCr 4.43 [03-05 @ 06:00]  SCr 4.33 [03-04 @ 06:20]    Urinalysis - [02-25-18 @ 04:50]      Color PLYEL / Appearance CLEAR / SG 1.015 / pH 5.5      Gluc NEGATIVE / Ketone NEGATIVE  / Bili NEGATIVE / Urobili NORMAL       Blood TRACE / Protein 100 / Leuk Est TRACE / Nitrite NEGATIVE      RBC 0-2 / WBC 2-5 / Hyaline  / Gran  / Sq Epi OCC / Non Sq Epi  / Bacteria       Iron 21, TIBC 163, %sat --      [02-20-18 @ 05:00]  Ferritin 540.7      [02-20-18 @ 05:00]  HbA1c 5.9      [02-13-18 @ 07:11]  TSH 3.22      [03-08-18 @ 06:00]      Free Light Chains: kappa 20.50, lambda 11.20, ratio = 1.83 H      [02-25 @ 07:43] [facility] DIVISION OF KIDNEY DISEASES AND HYPERTENSION -- FOLLOW UP NOTE  --------------------------------------------------------------------------------  HPI: 63-year-old male with PMH of HTN, DM, right BKA, and CKD admitted for left foot infection. As per review of labs on [address]/[employer], Scr was 1.51 in 3/2017. As per PMD's office, Scr checked in 7/2017 was 2.30. Labs on admission (2/12) showed elevated Scr of 3.8 which peaked to 6.3 on 2/23 and is stable at 4.63 today. Patient seen and examined today. Pt planned for vascular study today. Pt. denies SOB, CP or N/V.     PAST HISTORY  --------------------------------------------------------------------------------  No significant changes to PMH, PSH, FHx, SHx, unless otherwise noted    ALLERGIES & MEDICATIONS  --------------------------------------------------------------------------------  Allergies    No Known Allergies    Standing Inpatient Medications  aspirin enteric coated 81 milliGRAM(s) Oral daily  atorvastatin 80 milliGRAM(s) Oral at bedtime  cyanocobalamin 1000 MICROGram(s) Oral daily  dextrose 5%. 1000 milliLiter(s) IV Continuous <Continuous>  dextrose 50% Injectable 12.5 Gram(s) IV Push once  dextrose 50% Injectable 25 Gram(s) IV Push once  dextrose 50% Injectable 25 Gram(s) IV Push once  heparin  Injectable 5000 Unit(s) SubCutaneous every 8 hours  influenza   Vaccine 0.5 milliLiter(s) IntraMuscular once  insulin lispro (HumaLOG) corrective regimen sliding scale   SubCutaneous three times a day before meals  labetalol 300 milliGRAM(s) Oral three times a day  lactobacillus acidophilus 1 Tablet(s) Oral daily  multivitamin 1 Tablet(s) Oral daily  NIFEdipine XL 60 milliGRAM(s) Oral daily  silver sulfADIAZINE 1% Cream 1 Application(s) Topical daily  sodium bicarbonate 1300 milliGRAM(s) Oral three times a day  sodium chloride 0.9%. 1000 milliLiter(s) IV Continuous <Continuous>    REVIEW OF SYSTEMS  --------------------------------------------------------------------------------  CVS: no chest pain  RESP: no SOB  ABD: no abdominal pain  : no dysuria  MSK: +Left leg edema/foot infection    VITALS/PHYSICAL EXAM  --------------------------------------------------------------------------------  T(C): 37.7 (03-08-18 @ 05:38), Max: 37.7 (03-08-18 @ 05:38)  HR: 66 (03-08-18 @ 05:38) (55 - 66)  BP: 150/66 (03-08-18 @ 05:38) (128/54 - 154/60)  RR: 18 (03-08-18 @ 05:38) (18 - 19)  SpO2: 95% (03-08-18 @ 05:38) (95% - 100%)  Wt(kg): --  Height (cm): 167.64 (03-08-18 @ 04:01)  Weight (kg): 74.9 (03-08-18 @ 04:01)  BMI (kg/m2): 26.7 (03-08-18 @ 04:01)  BSA (m2): 1.84 (03-08-18 @ 04:01)    03-07-18 @ 07:01  -  03-08-18 @ 07:00  --------------------------------------------------------  IN: 1270 mL / OUT: 850 mL / NET: 420 mL    Physical Exam:  	Gen: Resting in bed   	HEENT: No JVD  	Pulm: CTA B/L  	CV: S1S2+  	Abd: soft  	LE: Right BKA, LLE edema present, left foot dressing seen   	Neuro: Awake and alert   	Psych: Normal affect and mood  	Skin: Warm    LABS/STUDIES  --------------------------------------------------------------------------------              7.3    4.15  >-----------<  277      [03-08-18 @ 06:00]              23.6     144  |  102  |  74  ----------------------------<  85      [03-08-18 @ 06:00]  5.0   |  27  |  4.63        Ca     8.1     [03-08-18 @ 06:00]    Creatinine Trend:  SCr 4.63 [03-08 @ 06:00]  SCr 4.66 [03-07 @ 08:22]  SCr 4.39 [03-06 @ 04:25]  SCr 4.43 [03-05 @ 06:00]  SCr 4.33 [03-04 @ 06:20]

## 2018-03-08 NOTE — PROGRESS NOTE ADULT - ATTENDING COMMENTS
Patient seen and examined. Currently, no complaints this AM. Angiogram of the LLE showed severe arterial disease requiring femoral-popliteal bypass, now planned for Monday 3/12/18. Appreciate cardiology recs for pre-op clearance, will schedule NST. Continue with current medication regiment at this time. Continue with wound care to foot.     Further details of plan per resident note above

## 2018-03-08 NOTE — PROGRESS NOTE ADULT - SUBJECTIVE AND OBJECTIVE BOX
Patient is a 63y old  Male who presents with a chief complaint of 63M PMH DM, HTN, CKD p/w L foot pain x 3 days. (19 Feb 2018 10:39)       INTERVAL HPI/OVERNIGHT EVENTS:  Patient seen and evaluated at bedside.  Pt is resting comfortable in NAD. Denies N/V/F/C.  Pain rated at X/10    Allergies    No Known Allergies    Intolerances        Vital Signs Last 24 Hrs  T(C): 37.7 (08 Mar 2018 05:38), Max: 37.7 (08 Mar 2018 05:38)  T(F): 99.9 (08 Mar 2018 05:38), Max: 99.9 (08 Mar 2018 05:38)  HR: 66 (08 Mar 2018 05:38) (55 - 66)  BP: 150/66 (08 Mar 2018 05:38) (128/54 - 154/60)  BP(mean): --  RR: 18 (08 Mar 2018 05:38) (18 - 19)  SpO2: 95% (08 Mar 2018 05:38) (95% - 100%)    LABS:                        7.3    4.15  )-----------( 277      ( 08 Mar 2018 06:00 )             23.6     03-07    143  |  100  |  71<H>  ----------------------------<  84  4.8   |  26  |  4.66<H>    Ca    8.7      07 Mar 2018 08:22      PT/INR - ( 08 Mar 2018 06:00 )   PT: 14.0 SEC;   INR: 1.26          PTT - ( 08 Mar 2018 06:00 )  PTT:33.3 SEC    CAPILLARY BLOOD GLUCOSE      POCT Blood Glucose.: 90 mg/dL (08 Mar 2018 06:11)  POCT Blood Glucose.: 103 mg/dL (08 Mar 2018 00:46)  POCT Blood Glucose.: 118 mg/dL (07 Mar 2018 21:41)  POCT Blood Glucose.: 107 mg/dL (07 Mar 2018 17:17)  POCT Blood Glucose.: 86 mg/dL (07 Mar 2018 12:11)  POCT Blood Glucose.: 87 mg/dL (07 Mar 2018 08:25)      Lower Extremity Physical Exam:  Vasular: DP/PT 0/4, B/L, CFT <2 seconds B/L, Temperature gradient warm, B/L.   Neuro: Epicritic sensation absent to the level of toes, B/L.  Musculoskeletal/Ortho: RIGHT BKA.  Skin: Left foot deroofed blister with cherry red non-blanchable trophic changes to plantar aspect of toes 1-5, forefoot, midfoot, no ascending erythema or swelling, no malodor, no purulence, no deep probe, etiology unknown, ROM of toes intact, CFT to each toes normal, no sinus tract, no undermining. Overall appearance of foot continues to improve.    RADIOLOGY & ADDITIONAL TESTS:

## 2018-03-08 NOTE — PROGRESS NOTE ADULT - ASSESSMENT
63M LEFT toes, forefoot, midfoot blister with cellulitis (improving with local care).    Plan:  - Pt seen and evaluated  - Appearance of foot continues to improve  - Cont IV abx  - Silvadene to LEFT foot wound daily  - No pod sx intervention at this time  - Angio today with vasc surg  - Will cont to follow.

## 2018-03-08 NOTE — PROGRESS NOTE ADULT - SUBJECTIVE AND OBJECTIVE BOX
Progress Note    JAMES PATRICK 63y (1955) Male [id number]  02-12-18 (24d)    DORON Thibodeaux PGY1  Pager# [phone]    Chief Complaint: 63M PMH DM, HTN, CKD p/w L foot pain x 3 days.    Subjective:  No acute events overnight. Patient seen and examined at bedside. Patient scheduled for angiogram today.     Review of Systems:  CONSTITUTIONAL: No fever, weight loss, or fatigue  EYES: No eye pain, visual disturbances, or discharge  ENMT:  No difficulty hearing, tinnitus, vertigo; No sinus or throat pain  NECK: No pain or stiffness  RESPIRATORY: No cough, wheezing, chills or hemoptysis; No shortness of breath  CARDIOVASCULAR: No chest pain, palpitations, dizziness, or leg swelling  GASTROINTESTINAL: No abdominal or epigastric pain. No nausea, vomiting, or hematemesis; No diarrhea or constipation. No melena or hematochezia.  GENITOURINARY: No dysuria, frequency, hematuria, or incontinence  NEUROLOGICAL: No headaches, memory loss, loss of strength, numbness, or tremors  SKIN: No itching, burning, rashes, or lesions   MUSCULOSKELETAL: No joint pain or swelling; No muscle, back, or extremity pain      PAST MEDICAL & SURGICAL HISTORY:  Kidney disease (N28.9)  HTN (hypertension) (I10)  DM (diabetes mellitus) (E11.9)  S/P BKA (below knee amputation) unilateral, right (Z89.511)  No significant past surgical history (852143314)    acetaminophen   Tablet 650 milliGRAM(s) Oral every 6 hours PRN  acetaminophen   Tablet. 650 milliGRAM(s) Oral every 6 hours PRN  aspirin enteric coated 81 milliGRAM(s) Oral daily  atorvastatin 80 milliGRAM(s) Oral at bedtime  cyanocobalamin 1000 MICROGram(s) Oral daily  dextrose 5%. 1000 milliLiter(s) IV Continuous <Continuous>  dextrose 50% Injectable 12.5 Gram(s) IV Push once  dextrose 50% Injectable 25 Gram(s) IV Push once  dextrose 50% Injectable 25 Gram(s) IV Push once  dextrose Gel 1 Dose(s) Oral once PRN  glucagon  Injectable 1 milliGRAM(s) IntraMuscular once PRN  heparin  Injectable 5000 Unit(s) SubCutaneous every 8 hours  influenza   Vaccine 0.5 milliLiter(s) IntraMuscular once  insulin lispro (HumaLOG) corrective regimen sliding scale   SubCutaneous three times a day before meals  labetalol 300 milliGRAM(s) Oral three times a day  lactobacillus acidophilus 1 Tablet(s) Oral daily  multivitamin 1 Tablet(s) Oral daily  NIFEdipine XL 60 milliGRAM(s) Oral daily  silver sulfADIAZINE 1% Cream 1 Application(s) Topical daily  sodium bicarbonate 1300 milliGRAM(s) Oral three times a day  sodium chloride 0.9%. 1000 milliLiter(s) IV Continuous <Continuous>    Objective:  T(C): 37.7 (03-08-18 @ 05:38), Max: 37.7 (03-08-18 @ 05:38)  HR: 66 (03-08-18 @ 05:38) (55 - 66)  BP: 150/66 (03-08-18 @ 05:38) (128/54 - 154/60)  RR: 18 (03-08-18 @ 05:38) (18 - 19)  SpO2: 95% (03-08-18 @ 05:38) (95% - 100%)    Physical exam:  GENERAL: NAD, well-developed  HEAD:  Atraumatic, Normocephalic  EYES: EOMI, PERRLA, conjunctiva and sclera clear  NECK: Supple, No JVD  CHEST/LUNG: Clear to auscultation bilaterally; No wheeze  HEART: Regular rate and rhythm; No murmurs, rubs, or gallops  ABDOMEN: Soft, Nontender, Nondistended; Bowel sounds present  EXTREMITIES:  2+ Peripheral Pulses, No clubbing, cyanosis, or edema  PSYCH: AAOx3  NEUROLOGY: non-focal  SKIN: No rashes or lesions      03-06-18 @ 07:01  -  03-07-18 @ 07:00  --------------------------------------------------------  IN: 960 mL / OUT: 1650 mL / NET: -690 mL    03-07-18 @ 07:01  -  03-08-18 @ 06:57  --------------------------------------------------------  IN: 960 mL / OUT: 750 mL / NET: 210 mL        CAPILLARY BLOOD GLUCOSE        03-07    143  |  100  |  71<H>  ----------------------------<  84  4.8   |  26  |  4.66<H>    Ca    8.7      07 Mar 2018 08:22      WBC Trend: 4.78<--, 5.70<--    Hb Trend: 7.5<--, 7.5<--        New imaging in last 24 hours: none  Consult notes reviewed: vascular Progress Note    JAMES PATRICK 63y (1955) Male [id number]  02-12-18 (24d)    Dr. Galvez [facility] PGY1  Pager# [phone]    Chief Complaint: 63M PMH DM, HTN, CKD p/w L foot pain x 3 days.    Subjective:  No acute events overnight. Patient seen and examined at bedside. Patient scheduled for angiogram today. Denies diarrhea, chest pain, SOB, fevers, chills.     Review of Systems:  CONSTITUTIONAL: No fever, weight loss, or fatigue  EYES: +decreased visual acuity at baseline  ENMT:  No difficulty hearing, tinnitus, vertigo; No sinus or throat pain  NECK: No pain or stiffness  RESPIRATORY: No cough, wheezing, chills or hemoptysis; No shortness of breath  CARDIOVASCULAR: No chest pain, palpitations, dizziness, or leg swelling  GASTROINTESTINAL: No abdominal or epigastric pain. No nausea, vomiting, or hematemesis; No diarrhea or constipation. No melena or hematochezia.  GENITOURINARY: No dysuria, frequency, hematuria, or incontinence  NEUROLOGICAL: No headaches, memory loss, loss of strength, numbness, or tremors  SKIN: No itching, burning, rashes  MUSCULOSKELETAL: No joint pain or swelling; No muscle, back, or extremity pain      PAST MEDICAL & SURGICAL HISTORY:  Kidney disease (N28.9)  HTN (hypertension) (I10)  DM (diabetes mellitus) (E11.9)  S/P BKA (below knee amputation) unilateral, right (Z89.511)  No significant past surgical history (854123976)    acetaminophen   Tablet 650 milliGRAM(s) Oral every 6 hours PRN  acetaminophen   Tablet. 650 milliGRAM(s) Oral every 6 hours PRN  aspirin enteric coated 81 milliGRAM(s) Oral daily  atorvastatin 80 milliGRAM(s) Oral at bedtime  cyanocobalamin 1000 MICROGram(s) Oral daily  dextrose 5%. 1000 milliLiter(s) IV Continuous <Continuous>  dextrose 50% Injectable 12.5 Gram(s) IV Push once  dextrose 50% Injectable 25 Gram(s) IV Push once  dextrose 50% Injectable 25 Gram(s) IV Push once  dextrose Gel 1 Dose(s) Oral once PRN  glucagon  Injectable 1 milliGRAM(s) IntraMuscular once PRN  heparin  Injectable 5000 Unit(s) SubCutaneous every 8 hours  influenza   Vaccine 0.5 milliLiter(s) IntraMuscular once  insulin lispro (HumaLOG) corrective regimen sliding scale   SubCutaneous three times a day before meals  labetalol 300 milliGRAM(s) Oral three times a day  lactobacillus acidophilus 1 Tablet(s) Oral daily  multivitamin 1 Tablet(s) Oral daily  NIFEdipine XL 60 milliGRAM(s) Oral daily  silver sulfADIAZINE 1% Cream 1 Application(s) Topical daily  sodium bicarbonate 1300 milliGRAM(s) Oral three times a day  sodium chloride 0.9%. 1000 milliLiter(s) IV Continuous <Continuous>    Objective:  T(C): 37.7 (03-08-18 @ 05:38), Max: 37.7 (03-08-18 @ 05:38)  HR: 66 (03-08-18 @ 05:38) (55 - 66)  BP: 150/66 (03-08-18 @ 05:38) (128/54 - 154/60)  RR: 18 (03-08-18 @ 05:38) (18 - 19)  SpO2: 95% (03-08-18 @ 05:38) (95% - 100%)    Physical exam:  GENERAL: NAD, well-developed  HEAD:  Atraumatic, Normocephalic  EYES: EOMI, conjunctiva and sclera clear  NECK: Supple, No JVD  CHEST/LUNG: Clear to auscultation bilaterally; No wheeze  HEART: Regular rate and rhythm; No murmurs, rubs, or gallops  ABDOMEN: Soft, Nontender, Nondistended; Bowel sounds present  EXTREMITIES:  LLE: non-edematous. Sole was freshly wrapped by podiatry. No drainage noted on outer bandage. RLE: BKA with no overlying skin changes.  PSYCH: AAOx3  NEUROLOGY: non-focal        03-06-18 @ 07:01  -  03-07-18 @ 07:00  --------------------------------------------------------  IN: 960 mL / OUT: 1650 mL / NET: -690 mL    03-07-18 @ 07:01  -  03-08-18 @ 06:57  --------------------------------------------------------  IN: 960 mL / OUT: 750 mL / NET: 210 mL        CAPILLARY BLOOD GLUCOSE        03-07    143  |  100  |  71<H>  ----------------------------<  84  4.8   |  26  |  4.66<H>    Ca    8.7      07 Mar 2018 08:22      WBC Trend: 4.78<--, 5.70<--    Hb Trend: 7.5<--, 7.5<--        New imaging in last 24 hours: none  Consult notes reviewed: vascular, nephrology

## 2018-03-08 NOTE — PROGRESS NOTE ADULT - PROBLEM SELECTOR PLAN 6
Hgb has been stable in the 7-8 range.   Folate, B12, TSH ordered.  Likely 2/2 in setting of decreased EPO production d/t worsening kidney function. Hemolysis less likely. T bili is WNL. Acute blood loss also less likely as no source.  Will continue to monitor every 2 days Pt on oral medications at home.   c/w ISS  Well controlled.

## 2018-03-08 NOTE — PROGRESS NOTE ADULT - PROBLEM SELECTOR PLAN 2
Acidosis in the setting of renal failure and infection. Serum CO2 stable at 26 today. Pt. on oral sodium bicarbonate therapy. Monitor serum CO2 Acidosis in the setting of renal failure and infection. Serum CO2 increased to 27 today. Recommend to decrease oral sodium bicarbonate to 650 mg TID. Monitor serum CO2

## 2018-03-08 NOTE — PROGRESS NOTE ADULT - PROBLEM SELECTOR PLAN 2
Resolved; likely due to demand ischemia (troponins peaked)  C/w ASA and Statin For angiogram today; should help with wound healing.  L foot with cellulitis with resulting sepsis which is now improved since admission  S/p vancomycin and zosyn. Completed Unasyn on 2/22.  Podiatry has evaluated patient and report good improvement. recs appreciated.    Wound culture grew Acinetobacter, corynebacterium, staph haemolyticus and staph aureus. Blood cx with NGTD.  LLE xrays negative for free air; CT read negative for free air.

## 2018-03-08 NOTE — PROGRESS NOTE ADULT - PROBLEM SELECTOR PLAN 5
For angiogram today. Will help with improvement of leg wound.   - C/w ASA and statin c/w Labetalol 300mg TID, Nifedipine 60XL.

## 2018-03-08 NOTE — PROGRESS NOTE ADULT - PROBLEM SELECTOR PLAN 1
Pt. with JANY on CKD in the setting of infection and diarrhea. CKD in the setting of long-standing DM. Scr was 1.5 in 3/2017, increased to 2.30 in 7/2017. Scr on admission (2/12) was elevated at 3.81, increased to 6.3 on 2/23. Pt. with likely ATN. Scr improved to 4.66 yesterday and is stable at 4.63 today.  Monitor BMP and urine output. Avoid any potential nephrotoxins. Pt. at increased risk for radiocontrast nephropathy. Pt. understands the risk of worsening renal function after contrast/angiogram study. Pt. also understands the need for dialysis after contrast/angiogram study. Vascular study planned for today. Monitor labs and urine output Pt. with JANY on CKD in the setting of infection and diarrhea. CKD in the setting of long-standing DM. Scr was 1.5 in 3/2017, increased to 2.30 in 7/2017. Scr on admission (2/12) was elevated at 3.81, increased to 6.3 on 2/23. Pt. with likely ATN. Scr subsequently improved and is stable at 4.63 today. Monitor BMP and urine output. Avoid any potential nephrotoxins. Pt. at increased risk for radiocontrast nephropathy. Pt. understands the risk of worsening renal function after contrast/angiogram study. Pt. also understands the need for dialysis after contrast/angiogram study. Vascular study/angiogram planned for today. Monitor labs and urine output

## 2018-03-08 NOTE — CONSULT NOTE ADULT - SUBJECTIVE AND OBJECTIVE BOX
63yoM h/o DM on januvia and insulin approx 3x/week, CVA in 1990s, HTN, CKD stage 3, R BKA in 2009 p/w 3 day h/o worsening left foot pain. s/p angiogram today. Patient denies h/o ischemic heart disease. No h/o heart failure. TTE on 3/2/2019 reveals EF 68%. Normal left and right ventricular systolic function. Mild-Mod TR. Mod pulmonary HTN. Using a prosthetic, patient is able to walk 1 block before becoming SOB. Pt can walk up 5 steps before becoming SOB. Patient can eat and use the toilet by himself, but can no longer dress himself. Patient cannot do heavy house work nor participate in any type of sport. Patient has a home aide to help him at home 4 hours a day, 5 days a week. Patient denies SOB at rest, syncope, palpitations, dizziness, CP. Patient is a 63y old  Male who presents with a chief complaint of 63M PMH DM, HTN, CKD p/w L foot pain x 3 days. (19 Feb 2018 10:39)      HPI:  63M PMH DM on januvia, FS 80s at home, HTN, CKD, R BKA in 2009 presents with 3 day history of worsening foot pain. Patient reports that 3 days ago he started noticing a blister forming on the bottom of his left foot. Today the blister popped and patient had bloody fluid come out and felt extreme pain. Denies any fevers, chills, nausea, abdominal pain, vomiting. States his R foot had ulcer. Denies history of smoking.   Patient denies any obstructive urinary symptoms: urinates several times per day, no dysuria, hematuria, back pain. Patient follows with his PMD for his diabetic and renal needs. Denies shortness of breath and chest pain.     In ED, CRP found to be 110, ESR 96, WBC 10.94. Lactate 3.5. Creatinine 3.81. (12 Feb 2018 19:33)      PAST MEDICAL & SURGICAL HISTORY:  Kidney disease  HTN (hypertension)  DM (diabetes mellitus)  S/P BKA (below knee amputation) unilateral, right            ECHO  FINDINGS:      MEDICATIONS  (STANDING):  aspirin enteric coated 81 milliGRAM(s) Oral daily  atorvastatin 80 milliGRAM(s) Oral at bedtime  cyanocobalamin 1000 MICROGram(s) Oral daily  dextrose 5%. 1000 milliLiter(s) (50 mL/Hr) IV Continuous <Continuous>  dextrose 50% Injectable 12.5 Gram(s) IV Push once  dextrose 50% Injectable 25 Gram(s) IV Push once  dextrose 50% Injectable 25 Gram(s) IV Push once  heparin  Injectable 5000 Unit(s) SubCutaneous every 8 hours  influenza   Vaccine 0.5 milliLiter(s) IntraMuscular once  insulin lispro (HumaLOG) corrective regimen sliding scale   SubCutaneous three times a day before meals  labetalol 300 milliGRAM(s) Oral three times a day  lactobacillus acidophilus 1 Tablet(s) Oral daily  multivitamin 1 Tablet(s) Oral daily  NIFEdipine XL 60 milliGRAM(s) Oral daily  silver sulfADIAZINE 1% Cream 1 Application(s) Topical daily  sodium bicarbonate 1300 milliGRAM(s) Oral three times a day  sodium chloride 0.9%. 1000 milliLiter(s) (75 mL/Hr) IV Continuous <Continuous>    MEDICATIONS  (PRN):  acetaminophen   Tablet 650 milliGRAM(s) Oral every 6 hours PRN For Temp greater than 38 C (100.4 F)  acetaminophen   Tablet. 650 milliGRAM(s) Oral every 6 hours PRN Moderate Pain (4 - 6)  dextrose Gel 1 Dose(s) Oral once PRN Blood Glucose LESS THAN 70 milliGRAM(s)/deciliter  glucagon  Injectable 1 milliGRAM(s) IntraMuscular once PRN Glucose LESS THAN 70 milligrams/deciliter      FAMILY HISTORY:  Family history of diabetes mellitus (Mother)          REVIEW OF SYSTEMS      General:	    Skin/Breast:  	  Ophthalmologic:  	  ENMT:	    Respiratory and Thorax:  	  Cardiovascular:	    Gastrointestinal:	    Genitourinary:	    Musculoskeletal:	    Neurological:	    Psychiatric:	    Hematology/Lymphatics:	    Endocrine:	    Allergic/Immunologic:	  SOCIAL HISTORY:    CIGARETTES:    ALCOHOL:  Vital Signs Last 24 Hrs  T(C): 36.4 (08 Mar 2018 13:52), Max: 37.7 (08 Mar 2018 05:38)  T(F): 97.5 (08 Mar 2018 13:52), Max: 99.9 (08 Mar 2018 05:38)  HR: 60 (08 Mar 2018 13:52) (60 - 66)  BP: 142/71 (08 Mar 2018 13:52) (142/71 - 154/60)  BP(mean): --  RR: 17 (08 Mar 2018 13:52) (17 - 18)  SpO2: 95% (08 Mar 2018 13:52) (95% - 97%)    PHYSICAL EXAM:    GENERAL: NAD, well-developed  HEAD:  Atraumatic, Normocephalic  EYES: EOMI, conjunctiva and sclera clear  NECK: Supple, No JVD  CHEST/LUNG: Clear to auscultation bilaterally; No wheeze  HEART: Regular rate and rhythm; No murmurs, rubs, or gallops  ABDOMEN: Soft, Nontender, Nondistended; Bowel sounds present  EXTREMITIES:  LLE: non-edematous. Sole was freshly wrapped by podiatry. No drainage noted on outer bandage. RLE: BKA with no overlying skin changes.  PSYCH: AAOx3  NEUROLOGY: non-focal          ECG:    I&O's Detail    07 Mar 2018 07:01  -  08 Mar 2018 07:00  --------------------------------------------------------  IN:    lactated ringers.: 210 mL    Oral Fluid: 1060 mL  Total IN: 1270 mL    OUT:    Voided: 850 mL  Total OUT: 850 mL    Total NET: 420 mL      08 Mar 2018 07:01  -  08 Mar 2018 16:00  --------------------------------------------------------  IN:  Total IN: 0 mL    OUT:    Voided: 250 mL  Total OUT: 250 mL    Total NET: -250 mL          LABS:                        7.3    4.15  )-----------( 277      ( 08 Mar 2018 06:00 )             23.6     03-08    144  |  102  |  74<H>  ----------------------------<  85  5.0   |  27  |  4.63<H>    Ca    8.1<L>      08 Mar 2018 06:00          PT/INR - ( 08 Mar 2018 06:00 )   PT: 14.0 SEC;   INR: 1.26          PTT - ( 08 Mar 2018 06:00 )  PTT:33.3 SEC    I&O's Summary    07 Mar 2018 07:01  -  08 Mar 2018 07:00  --------------------------------------------------------  IN: 1270 mL / OUT: 850 mL / NET: 420 mL    08 Mar 2018 07:01  -  08 Mar 2018 16:00  --------------------------------------------------------  IN: 0 mL / OUT: 250 mL / NET: -250 mL      BNP  RADIOLOGY & ADDITIONAL STUDIES: Patient is a 63y old  Male who presents with a chief complaint of 63M PMH DM, HTN, CKD p/w L foot pain x 3 days. (19 Feb 2018 10:39)      HPI:  63M PMH DM on januvia, FS 80s at home, HTN, CKD, R BKA in 2009 presents with 3 day history of worsening foot pain. Patient reports that 3 days ago he started noticing a blister forming on the bottom of his left foot. Today the blister popped and patient had bloody fluid come out and felt extreme pain. Denies any fevers, chills, nausea, abdominal pain, vomiting. States his R foot had ulcer. Denies history of smoking.   Patient denies any obstructive urinary symptoms: urinates several times per day, no dysuria, hematuria, back pain. Patient follows with his PMD for his diabetic and renal needs. Denies shortness of breath and chest pain.     In ED, CRP found to be 110, ESR 96, WBC 10.94. Lactate 3.5. Creatinine 3.81. (12 Feb 2018 19:33)      PAST MEDICAL & SURGICAL HISTORY:  Kidney disease  HTN (hypertension)  DM (diabetes mellitus)  S/P BKA (below knee amputation) unilateral, right            ECHO  FINDINGS:      MEDICATIONS  (STANDING):  aspirin enteric coated 81 milliGRAM(s) Oral daily  atorvastatin 80 milliGRAM(s) Oral at bedtime  cyanocobalamin 1000 MICROGram(s) Oral daily  dextrose 5%. 1000 milliLiter(s) (50 mL/Hr) IV Continuous <Continuous>  dextrose 50% Injectable 12.5 Gram(s) IV Push once  dextrose 50% Injectable 25 Gram(s) IV Push once  dextrose 50% Injectable 25 Gram(s) IV Push once  heparin  Injectable 5000 Unit(s) SubCutaneous every 8 hours  influenza   Vaccine 0.5 milliLiter(s) IntraMuscular once  insulin lispro (HumaLOG) corrective regimen sliding scale   SubCutaneous three times a day before meals  labetalol 300 milliGRAM(s) Oral three times a day  lactobacillus acidophilus 1 Tablet(s) Oral daily  multivitamin 1 Tablet(s) Oral daily  NIFEdipine XL 60 milliGRAM(s) Oral daily  silver sulfADIAZINE 1% Cream 1 Application(s) Topical daily  sodium bicarbonate 1300 milliGRAM(s) Oral three times a day  sodium chloride 0.9%. 1000 milliLiter(s) (75 mL/Hr) IV Continuous <Continuous>    MEDICATIONS  (PRN):  acetaminophen   Tablet 650 milliGRAM(s) Oral every 6 hours PRN For Temp greater than 38 C (100.4 F)  acetaminophen   Tablet. 650 milliGRAM(s) Oral every 6 hours PRN Moderate Pain (4 - 6)  dextrose Gel 1 Dose(s) Oral once PRN Blood Glucose LESS THAN 70 milliGRAM(s)/deciliter  glucagon  Injectable 1 milliGRAM(s) IntraMuscular once PRN Glucose LESS THAN 70 milligrams/deciliter      FAMILY HISTORY:  Family history of diabetes mellitus (Mother)          SOCIAL HISTORY: Lives at home alone.     CIGARETTES: Denies    ALCOHOL: Denies    Vital Signs Last 24 Hrs  T(C): 36.4 (08 Mar 2018 13:52), Max: 37.7 (08 Mar 2018 05:38)  T(F): 97.5 (08 Mar 2018 13:52), Max: 99.9 (08 Mar 2018 05:38)  HR: 60 (08 Mar 2018 13:52) (60 - 66)  BP: 142/71 (08 Mar 2018 13:52) (142/71 - 154/60)  BP(mean): --  RR: 17 (08 Mar 2018 13:52) (17 - 18)  SpO2: 95% (08 Mar 2018 13:52) (95% - 97%)    PHYSICAL EXAM:    GENERAL: NAD, well-developed  HEAD:  Atraumatic, Normocephalic  EYES: EOMI, conjunctiva and sclera clear  NECK: Supple, No JVD  CHEST/LUNG: Clear to auscultation bilaterally; No wheeze  HEART: Regular rate and rhythm; No murmurs, rubs, or gallops  ABDOMEN: Soft, Nontender, Nondistended; Bowel sounds present  EXTREMITIES:  LLE: non-edematous. Sole was freshly wrapped by podiatry. No drainage noted on outer bandage. RLE: BKA with no overlying skin changes.  PSYCH: AAOx3  NEUROLOGY: non-focal          ECG:    I&O's Detail    07 Mar 2018 07:01  -  08 Mar 2018 07:00  --------------------------------------------------------  IN:    lactated ringers.: 210 mL    Oral Fluid: 1060 mL  Total IN: 1270 mL    OUT:    Voided: 850 mL  Total OUT: 850 mL    Total NET: 420 mL      08 Mar 2018 07:01  -  08 Mar 2018 16:00  --------------------------------------------------------  IN:  Total IN: 0 mL    OUT:    Voided: 250 mL  Total OUT: 250 mL    Total NET: -250 mL          LABS:                        7.3    4.15  )-----------( 277      ( 08 Mar 2018 06:00 )             23.6     03-08    144  |  102  |  74<H>  ----------------------------<  85  5.0   |  27  |  4.63<H>    Ca    8.1<L>      08 Mar 2018 06:00          PT/INR - ( 08 Mar 2018 06:00 )   PT: 14.0 SEC;   INR: 1.26          PTT - ( 08 Mar 2018 06:00 )  PTT:33.3 SEC    I&O's Summary    07 Mar 2018 07:01  -  08 Mar 2018 07:00  --------------------------------------------------------  IN: 1270 mL / OUT: 850 mL / NET: 420 mL    08 Mar 2018 07:01  -  08 Mar 2018 16:00  --------------------------------------------------------  IN: 0 mL / OUT: 250 mL / NET: -250 mL      BNP  RADIOLOGY & ADDITIONAL STUDIES: Patient is a 63y old  Male who presents with a chief complaint of 63M PMH DM, HTN, CKD p/w L foot pain x 3 days. (19 Feb 2018 10:39)      HPI:  63M PMH DM on januvia, FS 80s at home, HTN, CKD, R BKA in 2009 presents with 3 day history of worsening foot pain. Patient reports that 3 days ago he started noticing a blister forming on the bottom of his left foot. Today the blister popped and patient had bloody fluid come out and felt extreme pain. Denies any fevers, chills, nausea, abdominal pain, vomiting. States his R foot had ulcer. Denies history of smoking.   Patient denies any obstructive urinary symptoms: urinates several times per day, no dysuria, hematuria, back pain. Patient follows with his PMD for his diabetic and renal needs. Denies shortness of breath and chest pain.     In ED, CRP found to be 110, ESR 96, WBC 10.94. Lactate 3.5. Creatinine 3.81. (12 Feb 2018 19:33)      PAST MEDICAL & SURGICAL HISTORY:  Kidney disease  HTN (hypertension)  DM (diabetes mellitus)  S/P BKA (below knee amputation) unilateral, right            ECHO  FINDINGS:  < from: Transthoracic Echocardiogram (03.02.18 @ 11:03) >  1. Mitral annular calcification, otherwise normal mitral  valve. Mild mitral regurgitation.  2. Aortic valve leaflet morphology not well visualized.  Peak transaortic valve gradient equals 19 mm Hg, mean  transaortic valve gradient equals 11 mm Hg, consistent with  probable mild aortic stenosis. Minimal aortic  regurgitation.  3. Normal left ventricular internal dimensions and wall  thicknesses.  4. Endocardium not well visualized; grossly normal left  ventricular systolic function.  5. The right ventricle is not well visualized; grossly  normal right ventricular systolic function.  6. Estimated right ventricular systolic pressure equals 57  mm Hg, assuming right atrial pressure equals 10 mm Hg,  consistent with moderate pulmonary hypertension.  7. Bilateral pleural effusions.    < end of copied text >      MEDICATIONS  (STANDING):  aspirin enteric coated 81 milliGRAM(s) Oral daily  atorvastatin 80 milliGRAM(s) Oral at bedtime  cyanocobalamin 1000 MICROGram(s) Oral daily  dextrose 5%. 1000 milliLiter(s) (50 mL/Hr) IV Continuous <Continuous>  dextrose 50% Injectable 12.5 Gram(s) IV Push once  dextrose 50% Injectable 25 Gram(s) IV Push once  dextrose 50% Injectable 25 Gram(s) IV Push once  heparin  Injectable 5000 Unit(s) SubCutaneous every 8 hours  influenza   Vaccine 0.5 milliLiter(s) IntraMuscular once  insulin lispro (HumaLOG) corrective regimen sliding scale   SubCutaneous three times a day before meals  labetalol 300 milliGRAM(s) Oral three times a day  lactobacillus acidophilus 1 Tablet(s) Oral daily  multivitamin 1 Tablet(s) Oral daily  NIFEdipine XL 60 milliGRAM(s) Oral daily  silver sulfADIAZINE 1% Cream 1 Application(s) Topical daily  sodium bicarbonate 1300 milliGRAM(s) Oral three times a day  sodium chloride 0.9%. 1000 milliLiter(s) (75 mL/Hr) IV Continuous <Continuous>    MEDICATIONS  (PRN):  acetaminophen   Tablet 650 milliGRAM(s) Oral every 6 hours PRN For Temp greater than 38 C (100.4 F)  acetaminophen   Tablet. 650 milliGRAM(s) Oral every 6 hours PRN Moderate Pain (4 - 6)  dextrose Gel 1 Dose(s) Oral once PRN Blood Glucose LESS THAN 70 milliGRAM(s)/deciliter  glucagon  Injectable 1 milliGRAM(s) IntraMuscular once PRN Glucose LESS THAN 70 milligrams/deciliter      FAMILY HISTORY:  Family history of diabetes mellitus (Mother)          SOCIAL HISTORY: Lives at home alone.     CIGARETTES: Denies    ALCOHOL: Denies    Vital Signs Last 24 Hrs  T(C): 36.4 (08 Mar 2018 13:52), Max: 37.7 (08 Mar 2018 05:38)  T(F): 97.5 (08 Mar 2018 13:52), Max: 99.9 (08 Mar 2018 05:38)  HR: 60 (08 Mar 2018 13:52) (60 - 66)  BP: 142/71 (08 Mar 2018 13:52) (142/71 - 154/60)  BP(mean): --  RR: 17 (08 Mar 2018 13:52) (17 - 18)  SpO2: 95% (08 Mar 2018 13:52) (95% - 97%)    PHYSICAL EXAM:    GENERAL: NAD, well-developed  HEAD:  Atraumatic, Normocephalic  EYES: EOMI, conjunctiva and sclera clear  NECK: Supple, No JVD  CHEST/LUNG: Clear to auscultation bilaterally; No wheeze  HEART: Regular rate and rhythm; No murmurs, rubs, or gallops  ABDOMEN: Soft, Nontender, Nondistended; Bowel sounds present  EXTREMITIES:  LLE: non-edematous. Sole was freshly wrapped by podiatry. No drainage noted on outer bandage. RLE: BKA with no overlying skin changes.  PSYCH: AAOx3  NEUROLOGY: non-focal          ECG:    I&O's Detail    07 Mar 2018 07:01  -  08 Mar 2018 07:00  --------------------------------------------------------  IN:    lactated ringers.: 210 mL    Oral Fluid: 1060 mL  Total IN: 1270 mL    OUT:    Voided: 850 mL  Total OUT: 850 mL    Total NET: 420 mL      08 Mar 2018 07:01  -  08 Mar 2018 16:00  --------------------------------------------------------  IN:  Total IN: 0 mL    OUT:    Voided: 250 mL  Total OUT: 250 mL    Total NET: -250 mL          LABS:                        7.3    4.15  )-----------( 277      ( 08 Mar 2018 06:00 )             23.6     03-08    144  |  102  |  74<H>  ----------------------------<  85  5.0   |  27  |  4.63<H>    Ca    8.1<L>      08 Mar 2018 06:00          PT/INR - ( 08 Mar 2018 06:00 )   PT: 14.0 SEC;   INR: 1.26          PTT - ( 08 Mar 2018 06:00 )  PTT:33.3 SEC    I&O's Summary    07 Mar 2018 07:01  -  08 Mar 2018 07:00  --------------------------------------------------------  IN: 1270 mL / OUT: 850 mL / NET: 420 mL    08 Mar 2018 07:01  -  08 Mar 2018 16:00  --------------------------------------------------------  IN: 0 mL / OUT: 250 mL / NET: -250 mL      BNP  RADIOLOGY & ADDITIONAL STUDIES:

## 2018-03-09 LAB
BUN SERPL-MCNC: 72 MG/DL — HIGH (ref 7–23)
CALCIUM SERPL-MCNC: 8 MG/DL — LOW (ref 8.4–10.5)
CHLORIDE SERPL-SCNC: 103 MMOL/L — SIGNIFICANT CHANGE UP (ref 98–107)
CO2 SERPL-SCNC: 25 MMOL/L — SIGNIFICANT CHANGE UP (ref 22–31)
CREAT SERPL-MCNC: 4.15 MG/DL — HIGH (ref 0.5–1.3)
GLUCOSE SERPL-MCNC: 79 MG/DL — SIGNIFICANT CHANGE UP (ref 70–99)
HCT VFR BLD CALC: 23.7 % — LOW (ref 39–50)
HGB BLD-MCNC: 7.5 G/DL — LOW (ref 13–17)
MCHC RBC-ENTMCNC: 27.8 PG — SIGNIFICANT CHANGE UP (ref 27–34)
MCHC RBC-ENTMCNC: 31.6 % — LOW (ref 32–36)
MCV RBC AUTO: 87.8 FL — SIGNIFICANT CHANGE UP (ref 80–100)
NRBC # FLD: 0 — SIGNIFICANT CHANGE UP
PLATELET # BLD AUTO: 245 K/UL — SIGNIFICANT CHANGE UP (ref 150–400)
PMV BLD: 11.6 FL — SIGNIFICANT CHANGE UP (ref 7–13)
POTASSIUM SERPL-MCNC: 4.8 MMOL/L — SIGNIFICANT CHANGE UP (ref 3.5–5.3)
POTASSIUM SERPL-SCNC: 4.8 MMOL/L — SIGNIFICANT CHANGE UP (ref 3.5–5.3)
RBC # BLD: 2.7 M/UL — LOW (ref 4.2–5.8)
RBC # FLD: 13.9 % — SIGNIFICANT CHANGE UP (ref 10.3–14.5)
SODIUM SERPL-SCNC: 142 MMOL/L — SIGNIFICANT CHANGE UP (ref 135–145)
WBC # BLD: 4.37 K/UL — SIGNIFICANT CHANGE UP (ref 3.8–10.5)
WBC # FLD AUTO: 4.37 K/UL — SIGNIFICANT CHANGE UP (ref 3.8–10.5)

## 2018-03-09 PROCEDURE — 99233 SBSQ HOSP IP/OBS HIGH 50: CPT | Mod: GC

## 2018-03-09 PROCEDURE — 99232 SBSQ HOSP IP/OBS MODERATE 35: CPT

## 2018-03-09 PROCEDURE — 93970 EXTREMITY STUDY: CPT | Mod: 26

## 2018-03-09 RX ORDER — SODIUM BICARBONATE 1 MEQ/ML
650 SYRINGE (ML) INTRAVENOUS THREE TIMES A DAY
Refills: 0 | Status: DISCONTINUED | OUTPATIENT
Start: 2018-03-09 | End: 2018-03-10

## 2018-03-09 RX ADMIN — HEPARIN SODIUM 5000 UNIT(S): 5000 INJECTION INTRAVENOUS; SUBCUTANEOUS at 05:19

## 2018-03-09 RX ADMIN — ATORVASTATIN CALCIUM 80 MILLIGRAM(S): 80 TABLET, FILM COATED ORAL at 21:34

## 2018-03-09 RX ADMIN — PREGABALIN 1000 MICROGRAM(S): 225 CAPSULE ORAL at 12:29

## 2018-03-09 RX ADMIN — Medication 650 MILLIGRAM(S): at 21:34

## 2018-03-09 RX ADMIN — Medication 1300 MILLIGRAM(S): at 05:19

## 2018-03-09 RX ADMIN — Medication 300 MILLIGRAM(S): at 05:19

## 2018-03-09 RX ADMIN — HEPARIN SODIUM 5000 UNIT(S): 5000 INJECTION INTRAVENOUS; SUBCUTANEOUS at 14:26

## 2018-03-09 RX ADMIN — Medication 1 TABLET(S): at 12:29

## 2018-03-09 RX ADMIN — Medication 1 APPLICATION(S): at 12:30

## 2018-03-09 RX ADMIN — HEPARIN SODIUM 5000 UNIT(S): 5000 INJECTION INTRAVENOUS; SUBCUTANEOUS at 21:34

## 2018-03-09 RX ADMIN — Medication 81 MILLIGRAM(S): at 12:29

## 2018-03-09 RX ADMIN — Medication 60 MILLIGRAM(S): at 05:19

## 2018-03-09 RX ADMIN — Medication 650 MILLIGRAM(S): at 13:51

## 2018-03-09 RX ADMIN — Medication 300 MILLIGRAM(S): at 13:50

## 2018-03-09 NOTE — PROGRESS NOTE ADULT - PROBLEM SELECTOR PLAN 1
ATN on CKD stage III in the setting of sepsis. 4.4 may be his new baseline creatinine. S/p angiogram.  Continuing to monitor urine outputs. ATN on CKD stage III in the setting of sepsis. 4.4 may be his new baseline creatinine. S/p angiogram.  Continuing to monitor urine outputs.  Vascular u/s for vein mapping in case needs HD.

## 2018-03-09 NOTE — PROGRESS NOTE ADULT - ASSESSMENT
62 y/o male with PMH of HTN, DM, right BKA and CKD admitted with left foot infection. Pt. with JANY on CKD.

## 2018-03-09 NOTE — PROGRESS NOTE ADULT - SUBJECTIVE AND OBJECTIVE BOX
Patient is a 63y old  Male who presents with a chief complaint of 63M PMH DM, HTN, CKD p/w L foot pain x 3 days. (19 Feb 2018 10:39)      Vascular Surgery Attending Progress Note    Interval HPI: pt w/o c/o    Medications:  acetaminophen   Tablet 650 milliGRAM(s) Oral every 6 hours PRN  acetaminophen   Tablet. 650 milliGRAM(s) Oral every 6 hours PRN  aspirin enteric coated 81 milliGRAM(s) Oral daily  atorvastatin 80 milliGRAM(s) Oral at bedtime  cyanocobalamin 1000 MICROGram(s) Oral daily  dextrose 5%. 1000 milliLiter(s) IV Continuous <Continuous>  dextrose 50% Injectable 12.5 Gram(s) IV Push once  dextrose 50% Injectable 25 Gram(s) IV Push once  dextrose 50% Injectable 25 Gram(s) IV Push once  dextrose Gel 1 Dose(s) Oral once PRN  glucagon  Injectable 1 milliGRAM(s) IntraMuscular once PRN  heparin  Injectable 5000 Unit(s) SubCutaneous every 8 hours  influenza   Vaccine 0.5 milliLiter(s) IntraMuscular once  insulin lispro (HumaLOG) corrective regimen sliding scale   SubCutaneous three times a day before meals  labetalol 300 milliGRAM(s) Oral three times a day  lactobacillus acidophilus 1 Tablet(s) Oral daily  multivitamin 1 Tablet(s) Oral daily  NIFEdipine XL 60 milliGRAM(s) Oral daily  silver sulfADIAZINE 1% Cream 1 Application(s) Topical daily  sodium bicarbonate 650 milliGRAM(s) Oral three times a day      Vital Signs Last 24 Hrs  T(C): 36.9 (09 Mar 2018 21:11), Max: 36.9 (09 Mar 2018 04:58)  T(F): 98.4 (09 Mar 2018 21:11), Max: 98.4 (09 Mar 2018 04:58)  HR: 58 (09 Mar 2018 21:11) (58 - 69)  BP: 152/68 (09 Mar 2018 21:11) (148/62 - 155/71)  BP(mean): --  RR: 18 (09 Mar 2018 21:11) (18 - 18)  SpO2: 94% (09 Mar 2018 21:11) (94% - 96%)  I&O's Summary    08 Mar 2018 07:01  -  09 Mar 2018 07:00  --------------------------------------------------------  IN: 180 mL / OUT: 700 mL / NET: -520 mL    09 Mar 2018 07:01  -  09 Mar 2018 21:45  --------------------------------------------------------  IN: 0 mL / OUT: 150 mL / NET: -150 mL        Physical Exam:  Neuro  A&Ox3 VSS  Vascular:  lt toe 1 ischemia stable   Musculoskeletal: wnl    LABS:                        7.5    4.37  )-----------( 245      ( 09 Mar 2018 06:30 )             23.7     03-09    142  |  103  |  72<H>  ----------------------------<  79  4.8   |  25  |  4.15<H>    Ca    8.0<L>      09 Mar 2018 06:30      PT/INR - ( 08 Mar 2018 06:00 )   PT: 14.0 SEC;   INR: 1.26          PTT - ( 08 Mar 2018 06:00 )  PTT:33.3 SEC    BARI NICOLAS MD  [phone]

## 2018-03-09 NOTE — PROGRESS NOTE ADULT - PROBLEM SELECTOR PLAN 3
S/p angiogram. With multi vessel disease. Plan for fem-pop bypass on Monday.  - C/w ASA and statin S/p angiogram. With multi vessel disease. Plan for fem-pop bypass on Monday.  Pre-op labs for Monday.  NPO after midnight Sunday night.   C/w ASA and statin

## 2018-03-09 NOTE — PROGRESS NOTE ADULT - PROBLEM SELECTOR PLAN 4
Hgb has been stable in the 7-8 range.   Folate, B12, TSH all WNL.  Likely 2/2 in setting of decreased EPO production d/t worsening kidney function. Hemolysis less likely. T bili is WNL. Acute blood loss also less likely as no source.  Will continue to monitor every 2 days

## 2018-03-09 NOTE — PROGRESS NOTE ADULT - ATTENDING COMMENTS
Patient seen and examined. Currently, no complaints. Continue medical management for now. Plan for LLE fem-pop bypass on Monday with vein mapping prior. Appreciate vascular sx and cardiology.    Further details of plan per resident note above

## 2018-03-09 NOTE — PROGRESS NOTE ADULT - ASSESSMENT
64yo M with DM and R BKA, now presenting with Diabetic foot soft tissue infection.       - Wound care as per podiatry  - awaiting med /cardiac cl for lle byp fem pop tent mon if cleared

## 2018-03-09 NOTE — PROGRESS NOTE ADULT - SUBJECTIVE AND OBJECTIVE BOX
Patient is a 63y old  Male who presents with a chief complaint of 63M PMH DM, HTN, CKD p/w L foot pain x 3 days. (19 Feb 2018 10:39)       INTERVAL HPI/OVERNIGHT EVENTS:  Patient seen and evaluated at bedside.  Pt is resting comfortable in NAD. Denies N/V/F/C.  Pain rated at X/10    Allergies    No Known Allergies    Intolerances        Vital Signs Last 24 Hrs  T(C): 36.9 (09 Mar 2018 04:58), Max: 36.9 (09 Mar 2018 04:58)  T(F): 98.4 (09 Mar 2018 04:58), Max: 98.4 (09 Mar 2018 04:58)  HR: 69 (09 Mar 2018 04:58) (60 - 69)  BP: 148/62 (09 Mar 2018 04:58) (142/71 - 154/62)  BP(mean): --  RR: 18 (09 Mar 2018 04:58) (17 - 18)  SpO2: 95% (09 Mar 2018 04:58) (94% - 95%)    LABS:                        7.5    4.37  )-----------( 245      ( 09 Mar 2018 06:30 )             23.7     03-09    142  |  103  |  72<H>  ----------------------------<  79  4.8   |  25  |  4.15<H>    Ca    8.0<L>      09 Mar 2018 06:30      PT/INR - ( 08 Mar 2018 06:00 )   PT: 14.0 SEC;   INR: 1.26          PTT - ( 08 Mar 2018 06:00 )  PTT:33.3 SEC    CAPILLARY BLOOD GLUCOSE      POCT Blood Glucose.: 93 mg/dL (09 Mar 2018 08:29)  POCT Blood Glucose.: 109 mg/dL (08 Mar 2018 17:07)  POCT Blood Glucose.: 78 mg/dL (08 Mar 2018 14:58)      Lower Extremity Physical Exam:  Vasular: DP/PT 0/4, B/L, CFT <2 seconds B/L, Temperature gradient warm, B/L.   Neuro: Epicritic sensation absent to the level of toes, B/L.  Musculoskeletal/Ortho: RIGHT BKA.  Skin: Left foot deroofed blister with cherry red non-blanchable trophic changes to plantar aspect of toes 1-5, forefoot, midfoot, no ascending erythema or swelling, no malodor, no purulence, no deep probe, etiology unknown, ROM of toes intact, CFT to each toes normal, no sinus tract, no undermining. Overall appearance of foot continues to improve.    RADIOLOGY & ADDITIONAL TESTS:

## 2018-03-09 NOTE — PROGRESS NOTE ADULT - SUBJECTIVE AND OBJECTIVE BOX
Progress Note    JAMES PATRICK 63y (1955) Male [id number]  02-12-18 (25d)    Dr. Galvez [facility] PGY1  Pager# [phone]    Chief Complaint: 63M PMH DM, HTN, CKD p/w L foot pain x 3 days.    Subjective:  No acute events overnight. Patient seen and examined at bedside.    Review of Systems:  CONSTITUTIONAL: No fever, weight loss, or fatigue  EYES: +decreased vision at baseline b/l  ENMT:  No difficulty hearing, tinnitus, vertigo; No sinus or throat pain  NECK: No pain or stiffness  RESPIRATORY: No cough, wheezing, chills or hemoptysis; No shortness of breath  CARDIOVASCULAR: No chest pain, palpitations, dizziness, or leg swelling  GASTROINTESTINAL: No abdominal or epigastric pain. No nausea, vomiting, or hematemesis; No diarrhea or constipation. No melena or hematochezia.  GENITOURINARY: No dysuria, frequency, hematuria, or incontinence  NEUROLOGICAL: No headaches, memory loss, loss of strength, numbness, or tremors  SKIN: No itching, burning, rashes, or lesions   MUSCULOSKELETAL: No joint pain or swelling; No muscle, back, or extremity pain. +R BKA      PAST MEDICAL & SURGICAL HISTORY:  Kidney disease (N28.9)  HTN (hypertension) (I10)  DM (diabetes mellitus) (E11.9)  S/P BKA (below knee amputation) unilateral, right (Z89.511)  No significant past surgical history (747005761)    acetaminophen   Tablet 650 milliGRAM(s) Oral every 6 hours PRN  acetaminophen   Tablet. 650 milliGRAM(s) Oral every 6 hours PRN  aspirin enteric coated 81 milliGRAM(s) Oral daily  atorvastatin 80 milliGRAM(s) Oral at bedtime  cyanocobalamin 1000 MICROGram(s) Oral daily  dextrose 5%. 1000 milliLiter(s) IV Continuous <Continuous>  dextrose 50% Injectable 12.5 Gram(s) IV Push once  dextrose 50% Injectable 25 Gram(s) IV Push once  dextrose 50% Injectable 25 Gram(s) IV Push once  dextrose Gel 1 Dose(s) Oral once PRN  glucagon  Injectable 1 milliGRAM(s) IntraMuscular once PRN  heparin  Injectable 5000 Unit(s) SubCutaneous every 8 hours  influenza   Vaccine 0.5 milliLiter(s) IntraMuscular once  insulin lispro (HumaLOG) corrective regimen sliding scale   SubCutaneous three times a day before meals  labetalol 300 milliGRAM(s) Oral three times a day  lactobacillus acidophilus 1 Tablet(s) Oral daily  multivitamin 1 Tablet(s) Oral daily  NIFEdipine XL 60 milliGRAM(s) Oral daily  silver sulfADIAZINE 1% Cream 1 Application(s) Topical daily  sodium bicarbonate 1300 milliGRAM(s) Oral three times a day    Objective:  T(C): 36.9 (03-09-18 @ 04:58), Max: 36.9 (03-09-18 @ 04:58)  HR: 69 (03-09-18 @ 04:58) (60 - 69)  BP: 148/62 (03-09-18 @ 04:58) (142/71 - 154/62)  RR: 18 (03-09-18 @ 04:58) (17 - 18)  SpO2: 95% (03-09-18 @ 04:58) (94% - 95%)    Physical exam:  GENERAL: NAD, well-developed  HEAD:  Atraumatic, Normocephalic  EYES: EOMI, PERRLA, conjunctiva and sclera clear  NECK: Supple, No JVD  CHEST/LUNG: Clear to auscultation bilaterally; slightly dull bases  HEART: Regular rate and rhythm; No murmurs, rubs, or gallops  ABDOMEN: Soft, Nontender, Nondistended; Bowel sounds present  EXTREMITIES:  LLE: wound looks the same, no purulent drainage or bleeding noted. New skin is growing over the bottom part of wound.  PSYCH: AAOx3  NEUROLOGY: non-focal  SKIN: No rashes or lesions      03-08-18 @ 07:01  -  03-09-18 @ 07:00  --------------------------------------------------------  IN: 180 mL / OUT: 700 mL / NET: -520 mL        CAPILLARY BLOOD GLUCOSE      (03-08 @ 06:00)                      7.3  4.15 )-----------( 277                 23.6    Neutrophils = -- (--%)  Lymphocytes = -- (--%)  Eosinophils = -- (--%)  Basophils = -- (--%)  Monocytes = -- (--%)  Bands = --%    03-08    144  |  102  |  74<H>  ----------------------------<  85  5.0   |  27  |  4.63<H>    Ca    8.1<L>      08 Mar 2018 06:00      ( 08 Mar 2018 06:00 )   PT: 14.0 SEC;   INR: 1.26 ;       PTT:33.3 SEC      WBC Trend: 4.15<--, 4.78<--    Hb Trend: 7.3<--, 7.5<--        New imaging in last 24 hours: angiogram  Consult notes reviewed: cardiology, vascular, nephrology Progress Note    JAMES PATRICK 63y (1955) Male [id number]  02-12-18 (25d)    Dr. Galvez [facility] PGY1  Pager# [phone]    Chief Complaint: 63M PMH DM, HTN, CKD p/w L foot pain x 3 days.    Subjective:  No acute events overnight. Patient seen and examined at bedside. No complaints. States that he urinated "well" today. No pain at site, but has a large bandage wrapped around his anterior pelvis (pressure dressing after the angiogram).    Review of Systems:  CONSTITUTIONAL: No fever, weight loss, or fatigue  EYES: +decreased vision at baseline b/l  ENMT:  No difficulty hearing, tinnitus, vertigo; No sinus or throat pain  NECK: No pain or stiffness  RESPIRATORY: No cough, wheezing, chills or hemoptysis; No shortness of breath  CARDIOVASCULAR: No chest pain, palpitations, dizziness, or leg swelling  GASTROINTESTINAL: No abdominal or epigastric pain. No nausea, vomiting, or hematemesis; No diarrhea or constipation. No melena or hematochezia.  GENITOURINARY: No dysuria, frequency, hematuria, or incontinence  NEUROLOGICAL: No headaches, memory loss, loss of strength, numbness, or tremors  SKIN: No itching, burning, rashes, or lesions   MUSCULOSKELETAL: R BKA      PAST MEDICAL & SURGICAL HISTORY:  Kidney disease (N28.9)  HTN (hypertension) (I10)  DM (diabetes mellitus) (E11.9)  S/P BKA (below knee amputation) unilateral, right (Z89.511)  No significant past surgical history ([id number])    acetaminophen   Tablet 650 milliGRAM(s) Oral every 6 hours PRN  acetaminophen   Tablet. 650 milliGRAM(s) Oral every 6 hours PRN  aspirin enteric coated 81 milliGRAM(s) Oral daily  atorvastatin 80 milliGRAM(s) Oral at bedtime  cyanocobalamin 1000 MICROGram(s) Oral daily  dextrose 5%. 1000 milliLiter(s) IV Continuous <Continuous>  dextrose 50% Injectable 12.5 Gram(s) IV Push once  dextrose 50% Injectable 25 Gram(s) IV Push once  dextrose 50% Injectable 25 Gram(s) IV Push once  dextrose Gel 1 Dose(s) Oral once PRN  glucagon  Injectable 1 milliGRAM(s) IntraMuscular once PRN  heparin  Injectable 5000 Unit(s) SubCutaneous every 8 hours  influenza   Vaccine 0.5 milliLiter(s) IntraMuscular once  insulin lispro (HumaLOG) corrective regimen sliding scale   SubCutaneous three times a day before meals  labetalol 300 milliGRAM(s) Oral three times a day  lactobacillus acidophilus 1 Tablet(s) Oral daily  multivitamin 1 Tablet(s) Oral daily  NIFEdipine XL 60 milliGRAM(s) Oral daily  silver sulfADIAZINE 1% Cream 1 Application(s) Topical daily  sodium bicarbonate 1300 milliGRAM(s) Oral three times a day    Objective:  T(C): 36.9 (03-09-18 @ 04:58), Max: 36.9 (03-09-18 @ 04:58)  HR: 69 (03-09-18 @ 04:58) (60 - 69)  BP: 148/62 (03-09-18 @ 04:58) (142/71 - 154/62)  RR: 18 (03-09-18 @ 04:58) (17 - 18)  SpO2: 95% (03-09-18 @ 04:58) (94% - 95%)    Physical exam:  GENERAL: NAD, well-developed  HEAD:  Atraumatic, Normocephalic  EYES: EOMI, PERRLA, conjunctiva and sclera clear  NECK: Supple, No JVD  CHEST/LUNG: Clear to auscultation bilaterally; slightly dull bases  HEART: Regular rate and rhythm; No murmurs, rubs, or gallops  ABDOMEN: Soft, Nontender, Nondistended; Bowel sounds present  EXTREMITIES:  LLE: wound looks the same, no purulent drainage or bleeding noted. New skin is growing over the bottom part of wound.  PSYCH: AAOx3  NEUROLOGY: non-focal  SKIN: No rashes or lesions      03-08-18 @ 07:01  -  03-09-18 @ 07:00  --------------------------------------------------------  IN: 180 mL / OUT: 700 mL / NET: -520 mL        CAPILLARY BLOOD GLUCOSE      (03-08 @ 06:00)                      7.3  4.15 )-----------( 277                 23.6    Neutrophils = -- (--%)  Lymphocytes = -- (--%)  Eosinophils = -- (--%)  Basophils = -- (--%)  Monocytes = -- (--%)  Bands = --%    03-08    144  |  102  |  74<H>  ----------------------------<  85  5.0   |  27  |  4.63<H>    Ca    8.1<L>      08 Mar 2018 06:00      ( 08 Mar 2018 06:00 )   PT: 14.0 SEC;   INR: 1.26 ;       PTT:33.3 SEC      WBC Trend: 4.15<--, 4.78<--    Hb Trend: 7.3<--, 7.5<--        New imaging in last 24 hours: angiogram  Consult notes reviewed: cardiology, vascular, nephrology Progress Note    JAMES PATRICK 63y (1955) Male [id number]  02-12-18 (25d)    Dr. Galvez [facility] PGY1  Pager# [phone]    Chief Complaint: 63M PMH DM, HTN, CKD p/w L foot pain x 3 days.    Subjective:  No acute events overnight. Patient seen and examined at bedside. No complaints. States that he urinated "well" today. No pain at site, but has a large bandage wrapped around his anterior pelvis (pressure dressing after the angiogram).     Review of Systems:  CONSTITUTIONAL: No fever, weight loss, or fatigue  EYES: +decreased vision at baseline b/l  ENMT:  No difficulty hearing, tinnitus, vertigo; No sinus or throat pain  NECK: No pain or stiffness  RESPIRATORY: No cough, wheezing, chills or hemoptysis; No shortness of breath  CARDIOVASCULAR: No chest pain, palpitations, dizziness, or leg swelling  GASTROINTESTINAL: No abdominal or epigastric pain. No nausea, vomiting, or hematemesis; No diarrhea or constipation. No melena or hematochezia.  GENITOURINARY: No dysuria, frequency, hematuria, or incontinence  NEUROLOGICAL: No headaches, memory loss, loss of strength, numbness, or tremors  SKIN: No itching, burning, rashes, or lesions   MUSCULOSKELETAL: R BKA      PAST MEDICAL & SURGICAL HISTORY:  Kidney disease (N28.9)  HTN (hypertension) (I10)  DM (diabetes mellitus) (E11.9)  S/P BKA (below knee amputation) unilateral, right (Z89.511)  No significant past surgical history ([id number])    acetaminophen   Tablet 650 milliGRAM(s) Oral every 6 hours PRN  acetaminophen   Tablet. 650 milliGRAM(s) Oral every 6 hours PRN  aspirin enteric coated 81 milliGRAM(s) Oral daily  atorvastatin 80 milliGRAM(s) Oral at bedtime  cyanocobalamin 1000 MICROGram(s) Oral daily  dextrose 5%. 1000 milliLiter(s) IV Continuous <Continuous>  dextrose 50% Injectable 12.5 Gram(s) IV Push once  dextrose 50% Injectable 25 Gram(s) IV Push once  dextrose 50% Injectable 25 Gram(s) IV Push once  dextrose Gel 1 Dose(s) Oral once PRN  glucagon  Injectable 1 milliGRAM(s) IntraMuscular once PRN  heparin  Injectable 5000 Unit(s) SubCutaneous every 8 hours  influenza   Vaccine 0.5 milliLiter(s) IntraMuscular once  insulin lispro (HumaLOG) corrective regimen sliding scale   SubCutaneous three times a day before meals  labetalol 300 milliGRAM(s) Oral three times a day  lactobacillus acidophilus 1 Tablet(s) Oral daily  multivitamin 1 Tablet(s) Oral daily  NIFEdipine XL 60 milliGRAM(s) Oral daily  silver sulfADIAZINE 1% Cream 1 Application(s) Topical daily  sodium bicarbonate 1300 milliGRAM(s) Oral three times a day    Objective:  T(C): 36.9 (03-09-18 @ 04:58), Max: 36.9 (03-09-18 @ 04:58)  HR: 69 (03-09-18 @ 04:58) (60 - 69)  BP: 148/62 (03-09-18 @ 04:58) (142/71 - 154/62)  RR: 18 (03-09-18 @ 04:58) (17 - 18)  SpO2: 95% (03-09-18 @ 04:58) (94% - 95%)    Physical exam:  GENERAL: NAD, well-developed  HEAD:  Atraumatic, Normocephalic  EYES: EOMI, PERRLA, conjunctiva and sclera clear  NECK: Supple, No JVD  CHEST/LUNG: Clear to auscultation bilaterally; slightly dull bases  HEART: Regular rate and rhythm; No murmurs, rubs, or gallops  ABDOMEN: Soft, Nontender, Nondistended; Bowel sounds present  EXTREMITIES:  LLE: wound looks the same, no purulent drainage or bleeding noted. New skin is growing over the bottom part of wound.  PSYCH: AAOx3  NEUROLOGY: non-focal  SKIN: No rashes or lesions      03-08-18 @ 07:01  -  03-09-18 @ 07:00  --------------------------------------------------------  IN: 180 mL / OUT: 700 mL / NET: -520 mL        CAPILLARY BLOOD GLUCOSE      (03-08 @ 06:00)                      7.3  4.15 )-----------( 277                 23.6    Neutrophils = -- (--%)  Lymphocytes = -- (--%)  Eosinophils = -- (--%)  Basophils = -- (--%)  Monocytes = -- (--%)  Bands = --%    03-08    144  |  102  |  74<H>  ----------------------------<  85  5.0   |  27  |  4.63<H>    Ca    8.1<L>      08 Mar 2018 06:00      ( 08 Mar 2018 06:00 )   PT: 14.0 SEC;   INR: 1.26 ;       PTT:33.3 SEC      WBC Trend: 4.15<--, 4.78<--    Hb Trend: 7.3<--, 7.5<--        New imaging in last 24 hours: angiogram  Consult notes reviewed: cardiology, vascular, nephrology

## 2018-03-09 NOTE — PROGRESS NOTE ADULT - PROBLEM SELECTOR PLAN 2
Acidosis in the setting of renal failure and infection. Serum CO2 increased to 27 yesterday. Recommend to decrease oral sodium bicarbonate to 650 mg TID. Monitor serum CO2

## 2018-03-09 NOTE — PROGRESS NOTE ADULT - SUBJECTIVE AND OBJECTIVE BOX
[facility] DIVISION OF KIDNEY DISEASES AND HYPERTENSION --   --------------------------------------------------------------------------------  Chief Complaint: ESRD/Ongoing hemodialysis requirement    HPI: 63-year-old male with PMH of HTN, DM, right BKA, and CKD admitted for left foot infection. As per review of labs on [place]/[employer], Scr was 1.51 in 3/2017. As per PMD's office, Scr checked in 7/2017 was 2.30. Labs on admission (2/12) showed elevated Scr of 3.8 which peaked to 6.3 on 2/23 and was stable at 4.63 yesterday (3/8). Pt. underwent vascular study/left leg angiogram yesterday. Patient seen and examined today. Pt. denies SOB, CP or N/V.     PAST HISTORY  --------------------------------------------------------------------------------  No significant changes to PMH, PSH, FHx, SHx, unless otherwise noted    ALLERGIES & MEDICATIONS  --------------------------------------------------------------------------------  Allergies    No Known Allergies    Standing Inpatient Medications  aspirin enteric coated 81 milliGRAM(s) Oral daily  atorvastatin 80 milliGRAM(s) Oral at bedtime  cyanocobalamin 1000 MICROGram(s) Oral daily  dextrose 5%. 1000 milliLiter(s) IV Continuous <Continuous>  dextrose 50% Injectable 12.5 Gram(s) IV Push once  dextrose 50% Injectable 25 Gram(s) IV Push once  dextrose 50% Injectable 25 Gram(s) IV Push once  heparin  Injectable 5000 Unit(s) SubCutaneous every 8 hours  influenza   Vaccine 0.5 milliLiter(s) IntraMuscular once  insulin lispro (HumaLOG) corrective regimen sliding scale   SubCutaneous three times a day before meals  labetalol 300 milliGRAM(s) Oral three times a day  lactobacillus acidophilus 1 Tablet(s) Oral daily  multivitamin 1 Tablet(s) Oral daily  NIFEdipine XL 60 milliGRAM(s) Oral daily  silver sulfADIAZINE 1% Cream 1 Application(s) Topical daily  sodium bicarbonate 1300 milliGRAM(s) Oral three times a day    REVIEW OF SYSTEMS  --------------------------------------------------------------------------------  CVS: no chest pain  RESP: no SOB  ABD: no abdominal pain  : no dysuria  MSK: +Left leg edema/foot infection    VITALS/PHYSICAL EXAM  --------------------------------------------------------------------------------  T(C): 36.9 (03-09-18 @ 04:58), Max: 36.9 (03-09-18 @ 04:58)  HR: 69 (03-09-18 @ 04:58) (60 - 69)  BP: 148/62 (03-09-18 @ 04:58) (142/71 - 154/62)  RR: 18 (03-09-18 @ 04:58) (17 - 18)  SpO2: 95% (03-09-18 @ 04:58) (94% - 95%)  Wt(kg): --  Height (cm): 167.64 (03-08-18 @ 04:01)  Weight (kg): 74.9 (03-08-18 @ 04:01)  BMI (kg/m2): 26.7 (03-08-18 @ 04:01)  BSA (m2): 1.84 (03-08-18 @ 04:01)    03-08-18 @ 07:01  -  03-09-18 @ 07:00  --------------------------------------------------------  IN: 180 mL / OUT: 700 mL / NET: -520 mL    Physical Exam:  	Gen: Resting in bed   	HEENT: No JVD  	Pulm: CTA B/L  	CV: S1S2+  	Abd: soft  	LE: Right BKA, LLE edema present, left foot dressing seen   	Neuro: Awake and alert   	Psych: Normal affect and mood  	Skin: Warm    LABS/STUDIES  --------------------------------------------------------------------------------              7.5    4.37  >-----------<  245      [03-09-18 @ 06:30]              23.7     144  |  102  |  74  ----------------------------<  85      [03-08-18 @ 06:00]  5.0   |  27  |  4.63        Ca     8.1     [03-08-18 @ 06:00]

## 2018-03-09 NOTE — CHART NOTE - NSCHARTNOTEFT_GEN_A_CORE
Patient is planned to go to the OR on Monday 3/12 if cardiology risk stratification and optimization is documented  and patient remains hemodynamically stable with no signs of infection.  Please also obtain vein mapping and spare the patient's nondominant extremity.    Preop labs should be drawn 2 days prior to scheduled surgery and on morning of surgery.  Any HD needs should be addressed prior to surgery or after the case as to not delay the case.    MERCEDEZ Ba MD PGYII [id number]

## 2018-03-09 NOTE — PROGRESS NOTE ADULT - PROBLEM SELECTOR PLAN 2
S/p angiogram. Scheduled for fem-pop bypass on Monday. Would help with wound healing.   L foot with cellulitis with resulting sepsis which is now improved since admission  S/p vancomycin and zosyn. Completed Unasyn on 2/22.  Podiatry has evaluated patient and report good improvement. recs appreciated.    Wound culture grew Acinetobacter, corynebacterium, staph haemolyticus and staph aureus. Blood cx with NGTD.  LLE xrays negative for free air; CT read negative for free air.

## 2018-03-09 NOTE — PROGRESS NOTE ADULT - ASSESSMENT
63M PMH DM on januvia, FS 80s at home, HTN, CKD, R BKA in 2009 presented with cellulitis and sepsis that led to ATN. Creatinine function seemed to have stabilized but with CP yesterday without ST elevations/depressions on EKG with troponemia which could have been demand ischemia, currently resolved. Started patient on Lasix for fluid overload with improvement of leg swelling and lung sounds. S/p angiogram yesterday. Cardiology cleared.

## 2018-03-09 NOTE — PROGRESS NOTE ADULT - PROBLEM SELECTOR PLAN 1
Pt. with JANY on CKD in the setting of infection and diarrhea. CKD in the setting of long-standing DM. Scr was 1.5 in 3/2017, increased to 2.30 in 7/2017. Scr on admission (2/12) was elevated at 3.81, increased to 6.3 on 2/23. Pt. with likely ATN. Scr subsequently improved and was stable at 4.63 yesterday. Pt. underwent vascular study/left leg angiogram yesterday. Pt. at increased risk for radiocontrast nephropathy. Monitor BMP and urine output. Avoid any potential nephrotoxins. Monitor labs and urine output

## 2018-03-10 DIAGNOSIS — E87.2 ACIDOSIS: ICD-10-CM

## 2018-03-10 DIAGNOSIS — N17.0 ACUTE KIDNEY FAILURE WITH TUBULAR NECROSIS: ICD-10-CM

## 2018-03-10 LAB
BUN SERPL-MCNC: 75 MG/DL — HIGH (ref 7–23)
CALCIUM SERPL-MCNC: 7.8 MG/DL — LOW (ref 8.4–10.5)
CHLORIDE SERPL-SCNC: 102 MMOL/L — SIGNIFICANT CHANGE UP (ref 98–107)
CO2 SERPL-SCNC: 27 MMOL/L — SIGNIFICANT CHANGE UP (ref 22–31)
CREAT SERPL-MCNC: 4.45 MG/DL — HIGH (ref 0.5–1.3)
GLUCOSE SERPL-MCNC: 94 MG/DL — SIGNIFICANT CHANGE UP (ref 70–99)
POTASSIUM SERPL-MCNC: 4.5 MMOL/L — SIGNIFICANT CHANGE UP (ref 3.5–5.3)
POTASSIUM SERPL-SCNC: 4.5 MMOL/L — SIGNIFICANT CHANGE UP (ref 3.5–5.3)
SODIUM SERPL-SCNC: 143 MMOL/L — SIGNIFICANT CHANGE UP (ref 135–145)

## 2018-03-10 PROCEDURE — 71045 X-RAY EXAM CHEST 1 VIEW: CPT | Mod: 26

## 2018-03-10 PROCEDURE — 99233 SBSQ HOSP IP/OBS HIGH 50: CPT | Mod: GC

## 2018-03-10 RX ORDER — SODIUM BICARBONATE 1 MEQ/ML
650 SYRINGE (ML) INTRAVENOUS EVERY 12 HOURS
Refills: 0 | Status: DISCONTINUED | OUTPATIENT
Start: 2018-03-10 | End: 2018-03-22

## 2018-03-10 RX ORDER — FUROSEMIDE 40 MG
60 TABLET ORAL ONCE
Refills: 0 | Status: COMPLETED | OUTPATIENT
Start: 2018-03-10 | End: 2018-03-10

## 2018-03-10 RX ADMIN — ATORVASTATIN CALCIUM 80 MILLIGRAM(S): 80 TABLET, FILM COATED ORAL at 21:47

## 2018-03-10 RX ADMIN — HEPARIN SODIUM 5000 UNIT(S): 5000 INJECTION INTRAVENOUS; SUBCUTANEOUS at 06:00

## 2018-03-10 RX ADMIN — Medication 81 MILLIGRAM(S): at 11:07

## 2018-03-10 RX ADMIN — Medication 650 MILLIGRAM(S): at 06:00

## 2018-03-10 RX ADMIN — Medication 1 TABLET(S): at 11:07

## 2018-03-10 RX ADMIN — HEPARIN SODIUM 5000 UNIT(S): 5000 INJECTION INTRAVENOUS; SUBCUTANEOUS at 13:25

## 2018-03-10 RX ADMIN — Medication 60 MILLIGRAM(S): at 12:41

## 2018-03-10 RX ADMIN — Medication 300 MILLIGRAM(S): at 06:00

## 2018-03-10 RX ADMIN — Medication 1 APPLICATION(S): at 11:07

## 2018-03-10 RX ADMIN — Medication 650 MILLIGRAM(S): at 13:26

## 2018-03-10 RX ADMIN — Medication 60 MILLIGRAM(S): at 06:00

## 2018-03-10 RX ADMIN — PREGABALIN 1000 MICROGRAM(S): 225 CAPSULE ORAL at 11:07

## 2018-03-10 RX ADMIN — HEPARIN SODIUM 5000 UNIT(S): 5000 INJECTION INTRAVENOUS; SUBCUTANEOUS at 21:47

## 2018-03-10 NOTE — PROGRESS NOTE ADULT - PROBLEM SELECTOR PLAN 3
S/p angiogram. With multi vessel disease. Plan for fem-pop bypass on Monday if patient agrees.  Pre-op labs for Monday.  NPO after midnight Sunday night.   C/w ASA and statin S/p angiogram. With multi vessel disease. Plan for fem-pop bypass on Monday if patient agrees. s/p LE dopplers, for vein mapping for grafts.  Pre-op labs for Monday.  NPO after midnight Sunday night.   C/w ASA and statin Likely due to uremic acidosis with accumulate of organic products  - HCO3 27  - Decrease Bicarb to 650mg BID per renal recs

## 2018-03-10 NOTE — PROGRESS NOTE ADULT - ATTENDING COMMENTS
Patient seen and examined. Currently, patient SOB acutely especially when supine. CXR, personally reviewed, shows worsened bilateral hazy opacities consistent with pulmonary edema given acute on chronic respiratory failure with hypoxia. Plan to diurese with Lasix 60mg IV x1 and monitor response. If diuresing well, can give 2nd dose in the evening. Rise in SCr likely due to LANNY but only 2 days since dye exposure. Suspect patient will need permanent HD soon either way given intermittent fluid overload and pulm edema. Timing for fem-pop bypass unclear given current medical acuity. Will discuss option of HD access with nephro prior to procedure.     Further details of plan per resident note above

## 2018-03-10 NOTE — PROGRESS NOTE ADULT - SUBJECTIVE AND OBJECTIVE BOX
[facility] DIVISION OF KIDNEY DISEASES AND HYPERTENSION -- FOLLOW UP NOTE  --------------------------------------------------------------------------------  HPI: 63-year-old male with PMH of HTN, DM, right BKA, and CKD admitted for left foot infection. As per review of labs on [place]/[employer], Scr was 1.51 in 3/2017. As per PMD's office, Scr checked in 7/2017 was 2.30. Labs on admission (2/12) showed elevated Scr of 3.8 which peaked to 6.3 on 2/23 and is stable at 4.45 today. Pt. underwent vascular study/left leg angiogram on 3/8. Patient seen and examined today. Pt. denies SOB, CP or N/V.       PAST HISTORY  --------------------------------------------------------------------------------  No significant changes to PMH, PSH, FHx, SHx, unless otherwise noted    ALLERGIES & MEDICATIONS  --------------------------------------------------------------------------------  Allergies    No Known Allergies    Intolerances      Standing Inpatient Medications  aspirin enteric coated 81 milliGRAM(s) Oral daily  atorvastatin 80 milliGRAM(s) Oral at bedtime  cyanocobalamin 1000 MICROGram(s) Oral daily  dextrose 5%. 1000 milliLiter(s) IV Continuous <Continuous>  dextrose 50% Injectable 12.5 Gram(s) IV Push once  dextrose 50% Injectable 25 Gram(s) IV Push once  dextrose 50% Injectable 25 Gram(s) IV Push once  heparin  Injectable 5000 Unit(s) SubCutaneous every 8 hours  influenza   Vaccine 0.5 milliLiter(s) IntraMuscular once  insulin lispro (HumaLOG) corrective regimen sliding scale   SubCutaneous three times a day before meals  labetalol 300 milliGRAM(s) Oral three times a day  lactobacillus acidophilus 1 Tablet(s) Oral daily  multivitamin 1 Tablet(s) Oral daily  NIFEdipine XL 60 milliGRAM(s) Oral daily  silver sulfADIAZINE 1% Cream 1 Application(s) Topical daily  sodium bicarbonate 650 milliGRAM(s) Oral three times a day        REVIEW OF SYSTEMS  --------------------------------------------------------------------------------  CVS: no chest pain  RESP: no SOB  ABD: no abdominal pain  : no dysuria  MSK: +Left leg edema/foot infection    VITALS/PHYSICAL EXAM  --------------------------------------------------------------------------------  T(C): 36.8 (03-10-18 @ 05:23), Max: 36.9 (03-09-18 @ 21:11)  HR: 68 (03-10-18 @ 05:23) (58 - 68)  BP: 150/63 (03-10-18 @ 05:23) (150/63 - 155/71)  RR: 18 (03-10-18 @ 05:23) (18 - 18)  SpO2: 95% (03-10-18 @ 05:23) (94% - 96%)  Wt(kg): --        03-09-18 @ 07:01  -  03-10-18 @ 07:00  --------------------------------------------------------  IN: 180 mL / OUT: 700 mL / NET: -520 mL    03-10-18 @ 06:01  -  03-10-18 @ 11:15  --------------------------------------------------------  IN: 0 mL / OUT: 150 mL / NET: -150 mL      Physical Exam:  	Gen: Resting in bed   	HEENT: No JVD  	Pulm: CTA B/L  	CV: S1S2+  	Abd: soft  	LE: Right BKA, LLE edema present, left foot dressing seen   	Neuro: Awake and alert   	Psych: Normal affect and mood  	Skin: Warm      LABS/STUDIES  --------------------------------------------------------------------------------              7.5    4.37  >-----------<  245      [03-09-18 @ 06:30]              23.7     143  |  102  |  75  ----------------------------<  94      [03-10-18 @ 06:40]  4.5   |  27  |  4.45        Ca     7.8     [03-10-18 @ 06:40]            Creatinine Trend:  SCr 4.45 [03-10 @ 06:40]  SCr 4.15 [03-09 @ 06:30]  SCr 4.63 [03-08 @ 06:00]  SCr 4.66 [03-07 @ 08:22]  SCr 4.39 [03-06 @ 04:25]

## 2018-03-10 NOTE — PROGRESS NOTE ADULT - PROBLEM SELECTOR PLAN 1
Creatinine 4.45 from 4.15. JANY on ATN on CKD stage III in the setting of sepsis. 4.4 may be his new baseline creatinine. S/p angiogram.  Continuing to monitor urine outputs. Creatinine 4.45 from 4.15. ATN on CKD stage III in the setting of sepsis. 4.4 may be his new baseline creatinine. S/p angiogram.  Continuing to monitor urine outputs. Creatinine 4.45 from 4.15. ATN on CKD stage III in the setting of sepsis. 4.4 may be his new baseline creatinine. S/p angiogram.  Continuing to monitor urine outputs

## 2018-03-10 NOTE — PROGRESS NOTE ADULT - PROBLEM SELECTOR PLAN 7
-Heparin subq  -Dispo: Fem-pop bypass on Monday if pt amenable. c/w Labetalol 300mg TID, Nifedipine 60XL.

## 2018-03-10 NOTE — PROGRESS NOTE ADULT - SUBJECTIVE AND OBJECTIVE BOX
Patient is a 63y old  Male who presents with a chief complaint of 63M PMH DM, HTN, CKD p/w L foot pain x 3 days. (19 Feb 2018 10:39)       INTERVAL HPI/OVERNIGHT EVENTS:  Patient seen and evaluated at bedside.  Pt is resting comfortable in NAD. Denies N/V/F/C.  Pain rated at X/10    Allergies    No Known Allergies    Intolerances        Vital Signs Last 24 Hrs  T(C): 36.8 (10 Mar 2018 05:23), Max: 36.9 (09 Mar 2018 21:11)  T(F): 98.3 (10 Mar 2018 05:23), Max: 98.4 (09 Mar 2018 21:11)  HR: 68 (10 Mar 2018 05:23) (58 - 68)  BP: 150/63 (10 Mar 2018 05:23) (150/63 - 155/71)  BP(mean): --  RR: 18 (10 Mar 2018 05:23) (18 - 18)  SpO2: 95% (10 Mar 2018 05:23) (94% - 96%)    LABS:                        7.5    4.37  )-----------( 245      ( 09 Mar 2018 06:30 )             23.7     03-09    142  |  103  |  72<H>  ----------------------------<  79  4.8   |  25  |  4.15<H>    Ca    8.0<L>      09 Mar 2018 06:30          CAPILLARY BLOOD GLUCOSE      POCT Blood Glucose.: 126 mg/dL (09 Mar 2018 17:14)  POCT Blood Glucose.: 115 mg/dL (09 Mar 2018 12:23)  POCT Blood Glucose.: 93 mg/dL (09 Mar 2018 08:29)      Lower Extremity Physical Exam:  Vasular: DP/PT 0/4, B/L, CFT <2 seconds B/L, Temperature gradient warm, B/L.   Neuro: Epicritic sensation absent to the level of toes, B/L.  Musculoskeletal/Ortho: RIGHT BKA.  Skin: Left foot deroofed blister with cherry red non-blanchable trophic changes to plantar aspect of toes 1-5, forefoot, midfoot, no ascending erythema or swelling, no malodor, no purulence, no deep probe, etiology unknown, ROM of toes intact, CFT to each toes normal, no sinus tract, no undermining. Overall appearance of foot continues to improve.    RADIOLOGY & ADDITIONAL TESTS:

## 2018-03-10 NOTE — PROGRESS NOTE ADULT - ASSESSMENT
63M LEFT toes, forefoot, midfoot blister with cellulitis (improving with local care).    Plan:  - Pt seen and evaluated  - Appearance of foot continues to improve  - Cont IV abx  - Silvadene to LEFT foot wound daily  - No pod sx intervention at this time  - [facility] planning bypass for Monday.  - Will cont to follow.

## 2018-03-10 NOTE — PROGRESS NOTE ADULT - PROBLEM SELECTOR PLAN 2
Acidosis in the setting of renal failure and infection. Serum CO2 increased to 27 today. Recommend to decrease oral sodium bicarbonate to 650 mg BID. Monitor serum CO2

## 2018-03-10 NOTE — PROGRESS NOTE ADULT - PROBLEM SELECTOR PLAN 6
Pt on oral medications at home.   c/w ISS  Well controlled. Hgb has been stable in the 7-8 range.   Folate, B12, TSH all WNL.  Likely 2/2 in setting of decreased EPO production d/t worsening kidney function. Hemolysis less likely. T bili is WNL. Acute blood loss also less likely as no source.  Will continue to monitor every 2 days

## 2018-03-10 NOTE — PROGRESS NOTE ADULT - SUBJECTIVE AND OBJECTIVE BOX
Progress Note    JAMES PATRICK 63y (1955) Male [id number]  02-12-18 (26d)    Dr. Galvez [facility] PGY1  Pager# [phone]    Chief Complaint: 63M PMH DM, HTN, CKD p/w L foot pain x 3 days.    Subjective:  No acute events overnight. Patient seen and examined at bedside. Urinating well. No difficulties breathing. No n/v/d/abdominal pain, Patient states that he does not want surgery. Spoke to vascular resident; states he spoke to patient last night and he was amenable to surgery. Vascular resident will come and speak to patient again.    Review of Systems:  CONSTITUTIONAL: No fever, weight loss, or fatigue  EYES: +decreased visual acuity b/l at baseline  ENMT:  No difficulty hearing, tinnitus, vertigo; No sinus or throat pain  NECK: No pain or stiffness  RESPIRATORY: No cough, wheezing, chills or hemoptysis; No shortness of breath  CARDIOVASCULAR: No chest pain, palpitations, dizziness, or leg swelling  GASTROINTESTINAL: No abdominal or epigastric pain. No nausea, vomiting, or hematemesis; No diarrhea or constipation. No melena or hematochezia.  GENITOURINARY: No dysuria, frequency, hematuria, or incontinence  NEUROLOGICAL: No headaches, memory loss, loss of strength, numbness, or tremors  SKIN: + wound on LLE  MUSCULOSKELETAL: + R BKA.       PAST MEDICAL & SURGICAL HISTORY:  Kidney disease (N28.9)  HTN (hypertension) (I10)  DM (diabetes mellitus) (E11.9)  S/P BKA (below knee amputation) unilateral, right (Z89.511)  No significant past surgical history ([id number])    acetaminophen   Tablet 650 milliGRAM(s) Oral every 6 hours PRN  acetaminophen   Tablet. 650 milliGRAM(s) Oral every 6 hours PRN  aspirin enteric coated 81 milliGRAM(s) Oral daily  atorvastatin 80 milliGRAM(s) Oral at bedtime  cyanocobalamin 1000 MICROGram(s) Oral daily  dextrose 5%. 1000 milliLiter(s) IV Continuous <Continuous>  dextrose 50% Injectable 12.5 Gram(s) IV Push once  dextrose 50% Injectable 25 Gram(s) IV Push once  dextrose 50% Injectable 25 Gram(s) IV Push once  dextrose Gel 1 Dose(s) Oral once PRN  glucagon  Injectable 1 milliGRAM(s) IntraMuscular once PRN  heparin  Injectable 5000 Unit(s) SubCutaneous every 8 hours  influenza   Vaccine 0.5 milliLiter(s) IntraMuscular once  insulin lispro (HumaLOG) corrective regimen sliding scale   SubCutaneous three times a day before meals  labetalol 300 milliGRAM(s) Oral three times a day  lactobacillus acidophilus 1 Tablet(s) Oral daily  multivitamin 1 Tablet(s) Oral daily  NIFEdipine XL 60 milliGRAM(s) Oral daily  silver sulfADIAZINE 1% Cream 1 Application(s) Topical daily  sodium bicarbonate 650 milliGRAM(s) Oral three times a day    Objective:  T(C): 36.8 (03-10-18 @ 05:23), Max: 36.9 (03-09-18 @ 21:11)  HR: 68 (03-10-18 @ 05:23) (58 - 68)  BP: 150/63 (03-10-18 @ 05:23) (150/63 - 155/71)  RR: 18 (03-10-18 @ 05:23) (18 - 18)  SpO2: 95% (03-10-18 @ 05:23) (94% - 96%)    Physical exam:  GENERAL: NAD, well-developed  HEAD:  Atraumatic, Normocephalic  EYES: EOMI, conjunctiva and sclera clear  NECK: Supple, No JVD  CHEST/LUNG: Clear to auscultation bilaterally; No wheeze  HEART: Regular rate and rhythm; No murmurs, rubs, or gallops  ABDOMEN: Soft, Nontender, Nondistended; Bowel sounds present  EXTREMITIES:  LLE edema improved. Wound is improving. Scant discharge on bandage.   PSYCH: AAOx3  NEUROLOGY: non-focal  SKIN: see extremities exam      03-09-18 @ 07:01  -  03-10-18 @ 07:00  --------------------------------------------------------  IN: 180 mL / OUT: 700 mL / NET: -520 mL        CAPILLARY BLOOD GLUCOSE      (03-09 @ 06:30)                      7.5  4.37 )-----------( 245                 23.7    Neutrophils = -- (--%)  Lymphocytes = -- (--%)  Eosinophils = -- (--%)  Basophils = -- (--%)  Monocytes = -- (--%)  Bands = --%    03-10    143  |  102  |  75<H>  ----------------------------<  94  4.5   |  27  |  4.45<H>    Ca    7.8<L>      10 Mar 2018 06:40    WBC Trend: 4.37<--, 4.15<--, 4.78<--    Hb Trend: 7.5<--, 7.3<--, 7.5<--        New imaging in last 24 hour s: SHERRY dopplers  Consult notes reviewed: vascular

## 2018-03-10 NOTE — PROGRESS NOTE ADULT - PROBLEM SELECTOR PROBLEM 6
Type 2 diabetes mellitus with complication, without long-term current use of insulin Drop in hemoglobin

## 2018-03-10 NOTE — PROGRESS NOTE ADULT - PROBLEM SELECTOR PLAN 4
Hgb has been stable in the 7-8 range.   Folate, B12, TSH all WNL.  Likely 2/2 in setting of decreased EPO production d/t worsening kidney function. Hemolysis less likely. T bili is WNL. Acute blood loss also less likely as no source.  Will continue to monitor every 2 days S/p angiogram. Scheduled for fem-pop bypass on Monday. Would help with wound healing. Patient expressed that he does not want the surgery; vascular resident will speak to him again.  L foot with cellulitis with resulting sepsis which is now improved since admission  S/p vancomycin and zosyn. Completed Unasyn on 2/22.  Podiatry has evaluated patient and report good improvement. recs appreciated.    Wound culture grew Acinetobacter, corynebacterium, staph haemolyticus and staph aureus. Blood cx with NGTD.  LLE xrays negative for free air; CT read negative for free air.

## 2018-03-10 NOTE — PROGRESS NOTE ADULT - PROBLEM SELECTOR PLAN 1
Pt. with JANY on CKD in the setting of infection and diarrhea. CKD in the setting of long-standing DM. Scr was 1.5 in 3/2017, increased to 2.30 in 7/2017. Scr on admission (2/12) was elevated at 3.81, increased to 6.3 on 2/23. Pt. with likely ATN. Scr subsequently improved and is stable at 4.45 today. Pt. underwent vascular study/left leg angiogram on 3/8. Pt. at increased risk for radiocontrast nephropathy. Monitor BMP and urine output. Avoid any potential nephrotoxins. Monitor labs and urine output

## 2018-03-10 NOTE — PROGRESS NOTE ADULT - PROBLEM SELECTOR PLAN 5
c/w Labetalol 300mg TID, Nifedipine 60XL. S/p angiogram. With multi vessel disease. Plan for fem-pop bypass on Monday if patient agrees. s/p LE dopplers, for vein mapping for grafts.  Pre-op labs for Monday.  NPO after midnight Sunday night.   C/w ASA and statin

## 2018-03-11 LAB
APTT BLD: 38.1 SEC — HIGH (ref 27.5–37.4)
BLD GP AB SCN SERPL QL: NEGATIVE — SIGNIFICANT CHANGE UP
BUN SERPL-MCNC: 71 MG/DL — HIGH (ref 7–23)
CALCIUM SERPL-MCNC: 8 MG/DL — LOW (ref 8.4–10.5)
CHLORIDE SERPL-SCNC: 103 MMOL/L — SIGNIFICANT CHANGE UP (ref 98–107)
CO2 SERPL-SCNC: 25 MMOL/L — SIGNIFICANT CHANGE UP (ref 22–31)
CREAT SERPL-MCNC: 4.48 MG/DL — HIGH (ref 0.5–1.3)
GLUCOSE SERPL-MCNC: 86 MG/DL — SIGNIFICANT CHANGE UP (ref 70–99)
HCT VFR BLD CALC: 23.4 % — LOW (ref 39–50)
HGB BLD-MCNC: 7.5 G/DL — LOW (ref 13–17)
INR BLD: 1.15 — SIGNIFICANT CHANGE UP (ref 0.88–1.17)
MCHC RBC-ENTMCNC: 27.7 PG — SIGNIFICANT CHANGE UP (ref 27–34)
MCHC RBC-ENTMCNC: 32.1 % — SIGNIFICANT CHANGE UP (ref 32–36)
MCV RBC AUTO: 86.3 FL — SIGNIFICANT CHANGE UP (ref 80–100)
NRBC # FLD: 0 — SIGNIFICANT CHANGE UP
PLATELET # BLD AUTO: 242 K/UL — SIGNIFICANT CHANGE UP (ref 150–400)
PMV BLD: 11.3 FL — SIGNIFICANT CHANGE UP (ref 7–13)
POTASSIUM SERPL-MCNC: 4.4 MMOL/L — SIGNIFICANT CHANGE UP (ref 3.5–5.3)
POTASSIUM SERPL-SCNC: 4.4 MMOL/L — SIGNIFICANT CHANGE UP (ref 3.5–5.3)
PROTHROM AB SERPL-ACNC: 13.3 SEC — HIGH (ref 9.8–13.1)
RBC # BLD: 2.71 M/UL — LOW (ref 4.2–5.8)
RBC # FLD: 14.1 % — SIGNIFICANT CHANGE UP (ref 10.3–14.5)
RH IG SCN BLD-IMP: POSITIVE — SIGNIFICANT CHANGE UP
SODIUM SERPL-SCNC: 143 MMOL/L — SIGNIFICANT CHANGE UP (ref 135–145)
WBC # BLD: 4.56 K/UL — SIGNIFICANT CHANGE UP (ref 3.8–10.5)
WBC # FLD AUTO: 4.56 K/UL — SIGNIFICANT CHANGE UP (ref 3.8–10.5)

## 2018-03-11 PROCEDURE — 99233 SBSQ HOSP IP/OBS HIGH 50: CPT | Mod: GC

## 2018-03-11 RX ORDER — INSULIN LISPRO 100/ML
VIAL (ML) SUBCUTANEOUS
Refills: 0 | Status: DISCONTINUED | OUTPATIENT
Start: 2018-03-11 | End: 2018-03-12

## 2018-03-11 RX ORDER — FUROSEMIDE 40 MG
60 TABLET ORAL ONCE
Refills: 0 | Status: COMPLETED | OUTPATIENT
Start: 2018-03-11 | End: 2018-03-11

## 2018-03-11 RX ORDER — LABETALOL HCL 100 MG
300 TABLET ORAL THREE TIMES A DAY
Refills: 0 | Status: DISCONTINUED | OUTPATIENT
Start: 2018-03-11 | End: 2018-03-16

## 2018-03-11 RX ADMIN — Medication 650 MILLIGRAM(S): at 05:42

## 2018-03-11 RX ADMIN — ATORVASTATIN CALCIUM 80 MILLIGRAM(S): 80 TABLET, FILM COATED ORAL at 21:23

## 2018-03-11 RX ADMIN — Medication 81 MILLIGRAM(S): at 11:35

## 2018-03-11 RX ADMIN — Medication 60 MILLIGRAM(S): at 12:36

## 2018-03-11 RX ADMIN — Medication 1 APPLICATION(S): at 11:36

## 2018-03-11 RX ADMIN — Medication 300 MILLIGRAM(S): at 21:23

## 2018-03-11 RX ADMIN — Medication 300 MILLIGRAM(S): at 14:14

## 2018-03-11 RX ADMIN — HEPARIN SODIUM 5000 UNIT(S): 5000 INJECTION INTRAVENOUS; SUBCUTANEOUS at 14:14

## 2018-03-11 RX ADMIN — Medication 1 TABLET(S): at 11:36

## 2018-03-11 RX ADMIN — PREGABALIN 1000 MICROGRAM(S): 225 CAPSULE ORAL at 11:35

## 2018-03-11 RX ADMIN — Medication 1 TABLET(S): at 11:35

## 2018-03-11 RX ADMIN — HEPARIN SODIUM 5000 UNIT(S): 5000 INJECTION INTRAVENOUS; SUBCUTANEOUS at 05:42

## 2018-03-11 RX ADMIN — Medication 60 MILLIGRAM(S): at 05:42

## 2018-03-11 RX ADMIN — Medication 650 MILLIGRAM(S): at 17:40

## 2018-03-11 RX ADMIN — HEPARIN SODIUM 5000 UNIT(S): 5000 INJECTION INTRAVENOUS; SUBCUTANEOUS at 21:23

## 2018-03-11 NOTE — PROGRESS NOTE ADULT - ASSESSMENT
64 y/o male with PMH of HTN, DM, right BKA and CKD admitted with left foot infection. Pt. with JANY on CKD.

## 2018-03-11 NOTE — PROGRESS NOTE ADULT - PROBLEM SELECTOR PLAN 2
Likely due to pulmonary edema given acuity and CXR confirming such; possibly due to acute on chronic HFpEF in the setting of JANY. Now improved s/p lasix yesterday.  - Monitor I's and O's  - Monitor SpO2

## 2018-03-11 NOTE — PROGRESS NOTE ADULT - ATTENDING COMMENTS
Patient seen and examined. Currently, patient feeling improved since yesterday. Lung exam improved s/p Lasix given yesterday and now off of O2. Will plan for 2nd dose of Lasix today given propensity for pulmonary edema and need for volume optimization for planned fem-pop bypass tomorrow. NPO past midnight. F/u vascular/nephro recs, appreciated.    Further details of plan per resident note above

## 2018-03-11 NOTE — PROGRESS NOTE ADULT - PROBLEM SELECTOR PLAN 3
Likely due to uremic acidosis with accumulate of organic products  - HCO3 27  - Decrease Bicarb to 650mg BID per renal recs

## 2018-03-11 NOTE — PROGRESS NOTE ADULT - PROBLEM SELECTOR PLAN 4
S/p angiogram. Scheduled for fem-pop bypass on Monday. Would help with wound healing. Patient expressed that he does not want the surgery; vascular resident will speak to him again.  L foot with cellulitis with resulting sepsis which is now improved since admission  S/p vancomycin and zosyn. Completed Unasyn on 2/22.  Podiatry has evaluated patient and report good improvement. recs appreciated.    Wound culture grew Acinetobacter, corynebacterium, staph haemolyticus and staph aureus. Blood cx with NGTD.  LLE xrays negative for free air; CT read negative for free air.

## 2018-03-11 NOTE — PROGRESS NOTE ADULT - ASSESSMENT
63M PMH DM on januvia, FS 80s at home, HTN, CKD, R BKA in 2009 presented with cellulitis and sepsis c/b ATN which has improved and diastolic heart failure requiring intermittentl lasix. Pt found to have severe LLE SFA disease, now pending LLE fem-pop bypass tomorrow.

## 2018-03-11 NOTE — CHART NOTE - NSCHARTNOTEFT_GEN_A_CORE
PRE OPERATIVE NOTE    Pre-op Diagnosis: behind knee pop stenosis >80%, AT occluded. PT has 80% stenosis, peroneal occluded  Procedure:  Fem-pop bypass  Surgeon: Alisia [email or web address]    4.56  )-----------( 242      ( 11 Mar 2018 06:45 )             23.4     03-11    143  |  103  |  71<H>  ----------------------------<  86  4.4   |  25  |  4.48<H>    Ca    8.0<L>      11 Mar 2018 06:45        PT/INR - ( 11 Mar 2018 08:01 )   PT: 13.3 SEC;   INR: 1.15          PTT - ( 11 Mar 2018 08:01 )  PTT:38.1 SEC    CAPILLARY BLOOD GLUCOSE      POCT Blood Glucose.: 104 mg/dL (11 Mar 2018 11:59)      - Type & Screen: Ordered for 3/11/18  - CXR: 18  - EK18  - TTE (3/2/18): EF 68%  - Vein mapping completed 3/9/18  - Cardiology clearance 3/8/18      A/P: 63y Male planned for above procedure  - 2u pRBC ordered  - NPO past midnight, except medications  - IVF  - Pain/nausea control  - AM labs  - Consent in chart    furosemide   Injectable  labetalol  NIFEdipine XL

## 2018-03-11 NOTE — PROGRESS NOTE ADULT - PROBLEM SELECTOR PLAN 5
S/p angiogram. With multi vessel disease. Plan for fem-pop bypass on Monday if patient agrees. s/p LE dopplers, for vein mapping for grafts.  Pre-op labs for Monday.  NPO after midnight Sunday night.   C/w ASA and statin

## 2018-03-11 NOTE — PROGRESS NOTE ADULT - PROBLEM SELECTOR PLAN 1
Creatinine remains 4.48. ATN on CKD stage III in the setting of sepsis. 4.4 may be his new baseline creatinine. S/p angiogram.  Continuing to monitor urine outputs, trend sCr daily

## 2018-03-11 NOTE — PROGRESS NOTE ADULT - SUBJECTIVE AND OBJECTIVE BOX
Patient is a 63y old  Male who presents with a chief complaint of 63M PMH DM, HTN, CKD p/w L foot pain x 3 days. (19 Feb 2018 10:39)       INTERVAL HPI/OVERNIGHT EVENTS:  Patient seen and evaluated at bedside.  Pt is resting comfortable in NAD. Denies N/V/F/C.  Pain rated at X/10    Allergies    No Known Allergies    Intolerances        Vital Signs Last 24 Hrs  T(C): 36.9 (11 Mar 2018 05:40), Max: 37 (10 Mar 2018 21:01)  T(F): 98.5 (11 Mar 2018 05:40), Max: 98.6 (10 Mar 2018 21:01)  HR: 62 (11 Mar 2018 05:40) (60 - 64)  BP: 154/55 (11 Mar 2018 05:40) (139/62 - 154/55)  BP(mean): --  RR: 18 (11 Mar 2018 05:40) (18 - 18)  SpO2: 97% (11 Mar 2018 05:40) (95% - 97%)    LABS:                        7.5    4.56  )-----------( 242      ( 11 Mar 2018 06:45 )             23.4     03-11    143  |  103  |  71<H>  ----------------------------<  86  4.4   |  25  |  4.48<H>    Ca    8.0<L>      11 Mar 2018 06:45      PT/INR - ( 11 Mar 2018 08:01 )   PT: 13.3 SEC;   INR: 1.15          PTT - ( 11 Mar 2018 08:01 )  PTT:38.1 SEC    CAPILLARY BLOOD GLUCOSE      POCT Blood Glucose.: 86 mg/dL (11 Mar 2018 08:24)  POCT Blood Glucose.: 101 mg/dL (10 Mar 2018 22:29)  POCT Blood Glucose.: 103 mg/dL (10 Mar 2018 17:10)  POCT Blood Glucose.: 116 mg/dL (10 Mar 2018 12:07)      Lower Extremity Physical Exam:  Vasular: DP/PT 0/4, B/L, CFT <2 seconds B/L, Temperature gradient warm, B/L.   Neuro: Epicritic sensation absent to the level of toes, B/L.  Musculoskeletal/Ortho: RIGHT BKA.  Skin: Left foot deroofed blister with cherry red non-blanchable trophic changes to plantar aspect of toes 1-5, forefoot, midfoot, no ascending erythema or swelling, no malodor, no purulence, no deep probe, etiology unknown, ROM of toes intact, CFT to each toes normal, no sinus tract, no undermining. Overall appearance of foot continues to improve.    RADIOLOGY & ADDITIONAL TESTS:

## 2018-03-11 NOTE — PROGRESS NOTE ADULT - SUBJECTIVE AND OBJECTIVE BOX
Ramonita Gallardo ([place]), PGY-2  Pager: [phone]  After 7PM please page [phone]    Patient is a 63y old  Male who presents with a chief complaint of 63M PMH DM, HTN, CKD p/w L foot pain x 3 days. (19 Feb 2018 10:39)      INTERVAL HPI/OVERNIGHT EVENTS:  No acute overnight events. Pt denies SOB, Chest pain, abdominal pain, n/v/d. s/p lasix 60mg IVP x1 yesterday    REVIEW OF SYSTEMS:  Constitutional:     [ ] negative [ ] fevers [ ] chills [ ] weight loss [ ] weight gain  HEENT:                  [ ] negative [ ] dry eyes [ ] eye irritation [ ] postnasal drip [ ] nasal congestion  CV:                         [ ] negative  [ ] chest pain [ ] orthopnea [ ] palpitations [ ] murmur  Resp:                     [ ] negative [ ] cough [ ] shortness of breath [ ] dyspnea [ ] wheezing [ ] sputum [ ] hemoptysis  GI:                          [ ] negative [ ] nausea [ ] vomiting [ ] diarrhea [ ] constipation [ ] abd pain [ ] dysphagia   :                        [ ] negative [ ] dysuria [ ] nocturia [ ] hematuria [ ] increased urinary frequency  Musculoskeletal: [ ] negative [ ] back pain [ ] myalgias [ ] arthralgias [ ] fracture  Skin:                       [ ] negative [ ] rash [ ] itch  Neurological:        [ ] negative [ ] headache [ ] dizziness [ ] syncope [ ] weakness [ ] numbness  Psychiatric:           [ ] negative [ ] anxiety [ ] depression  Endocrine:            [ ] negative [ ] diabetes [ ] thyroid problem  Heme/Lymph:      [ ] negative [ ] anemia [ ] bleeding problem  Allergic/Immune: [ ] negative [ ] itchy eyes [ ] nasal discharge [ ] hives [ ] angioedema    [x] All other systems negative  [ ] Unable to assess ROS because pt intubated and sedated.    Vital Signs Last 24 Hrs  T(C): 36.9 (11 Mar 2018 05:40), Max: 37 (10 Mar 2018 21:01)  T(F): 98.5 (11 Mar 2018 05:40), Max: 98.6 (10 Mar 2018 21:01)  HR: 62 (11 Mar 2018 05:40) (60 - 64)  BP: 154/55 (11 Mar 2018 05:40) (139/62 - 154/55)  BP(mean): --  RR: 18 (11 Mar 2018 05:40) (18 - 18)  SpO2: 97% (11 Mar 2018 05:40) (95% - 97%)  I&O's Summary    10 Mar 2018 06:01  -  11 Mar 2018 07:00  --------------------------------------------------------  IN: 360 mL / OUT: 600 mL / NET: -240 mL        PHYSICAL EXAM:  General:  NAD  HEENT: PERRLA, EOMI, moist mucous membranes  Neurology: A&Ox3, nonfocal, ARIAS x 4  Respiratory: CTA B/L, normal respiratory effort, no wheezes, diminished LS at the bases  CV: RRR, S1S2, no murmurs, rubs or gallops  Abdominal: Soft, NT, ND +BS, Last BM  Extremities: No edema, + peripheral pulses      LABS:                        7.5    4.56  )-----------( 242      ( 11 Mar 2018 06:45 )             23.4     Hb Trend: 7.5<--, 7.5<--, 7.3<--, 7.5<--  WBC Trend: 4.56<--, 4.37<--, 4.15<--  Plt Trend: 242<--, 245<--, 277<--, 264<--          03-11    143  |  103  |  71<H>  ----------------------------<  86  4.4   |  25  |  4.48<H>    Ca    8.0<L>      11 Mar 2018 06:45        PT/INR - ( 11 Mar 2018 08:01 )   PT: 13.3 SEC;   INR: 1.15          PTT - ( 11 Mar 2018 08:01 )  PTT:38.1 SEC    CAPILLARY BLOOD GLUCOSE      POCT Blood Glucose.: 86 mg/dL (11 Mar 2018 08:24)  POCT Blood Glucose.: 101 mg/dL (10 Mar 2018 22:29)  POCT Blood Glucose.: 103 mg/dL (10 Mar 2018 17:10)  POCT Blood Glucose.: 116 mg/dL (10 Mar 2018 12:07)              RADIOLOGY & ADDITIONAL TESTS:    Imaging Personally Reviewed:  [ ] YES  [ ] NO [x] No New Imaging Last 24hrs    Consultant(s) Notes Reviewed:  [x ] YES  [ ] NO    Care Discussed with Consultants/Other Providers [ x] YES  [ ] NO

## 2018-03-11 NOTE — PROGRESS NOTE ADULT - PROBLEM SELECTOR PLAN 2
Acidosis in the setting of renal failure and infection. Serum CO2 25 (WNL). Continue with sodium bicarbonate therapy and Monitor serum CO2

## 2018-03-11 NOTE — PROGRESS NOTE ADULT - PROBLEM SELECTOR PLAN 1
Pt. with JANY on CKD in the setting of infection and diarrhea. CKD in the setting of long-standing DM. Scr was 1.5 in 3/2017, increased to 2.30 in 7/2017. Scr on admission (2/12) was elevated at 3.81, increased to 6.3 on 2/23. Pt. with likely ATN. Scr subsequently improved and is stable at 4.48 today. Pt. underwent vascular study/left leg angiogram on 3/8. Pt. at increased risk for radiocontrast nephropathy. Monitor BMP and urine output. Avoid any potential nephrotoxins.

## 2018-03-11 NOTE — PROGRESS NOTE ADULT - ASSESSMENT
63M LEFT toes, forefoot, midfoot blister with cellulitis (improving with local care).    Plan:  - Pt seen and evaluated  - Appearance of foot continues to improve  - Cont IV abx  - Silvadene to LEFT foot wound daily  - No pod sx intervention at this time  - [facility] planning bypass for Monday.  - Will cont to follow.

## 2018-03-11 NOTE — PROGRESS NOTE ADULT - SUBJECTIVE AND OBJECTIVE BOX
[facility] DIVISION OF KIDNEY DISEASES AND HYPERTENSION -- FOLLOW UP NOTE  --------------------------------------------------------------------------------  HPI: 63-year-old male with PMH of HTN, DM, right BKA, and CKD admitted for left foot infection. As per review of labs on [place]/[employer], Scr was 1.51 in 3/2017. As per PMD's office, Scr checked in 7/2017 was 2.30. Labs on admission (2/12) showed elevated Scr of 3.8 which peaked to 6.3 on 2/23 and is stable at 4.48 today. Pt. underwent vascular study/left leg angiogram on 3/8. Patient seen and examined today. Pt. denies SOB, CP or N/V.       PAST HISTORY  --------------------------------------------------------------------------------  No significant changes to PMH, PSH, FHx, SHx, unless otherwise noted    ALLERGIES & MEDICATIONS  --------------------------------------------------------------------------------  Allergies    No Known Allergies    Intolerances      Standing Inpatient Medications  aspirin enteric coated 81 milliGRAM(s) Oral daily  atorvastatin 80 milliGRAM(s) Oral at bedtime  cyanocobalamin 1000 MICROGram(s) Oral daily  dextrose 5%. 1000 milliLiter(s) IV Continuous <Continuous>  dextrose 50% Injectable 12.5 Gram(s) IV Push once  dextrose 50% Injectable 25 Gram(s) IV Push once  dextrose 50% Injectable 25 Gram(s) IV Push once  heparin  Injectable 5000 Unit(s) SubCutaneous every 8 hours  influenza   Vaccine 0.5 milliLiter(s) IntraMuscular once  insulin lispro (HumaLOG) corrective regimen sliding scale   SubCutaneous three times a day before meals  labetalol 300 milliGRAM(s) Oral three times a day  lactobacillus acidophilus 1 Tablet(s) Oral daily  multivitamin 1 Tablet(s) Oral daily  NIFEdipine XL 60 milliGRAM(s) Oral daily  silver sulfADIAZINE 1% Cream 1 Application(s) Topical daily  sodium bicarbonate 650 milliGRAM(s) Oral every 12 hours    PRN Inpatient Medications  acetaminophen   Tablet 650 milliGRAM(s) Oral every 6 hours PRN  acetaminophen   Tablet. 650 milliGRAM(s) Oral every 6 hours PRN  dextrose Gel 1 Dose(s) Oral once PRN  glucagon  Injectable 1 milliGRAM(s) IntraMuscular once PRN      REVIEW OF SYSTEMS  --------------------------------------------------------------------------------  CVS: no chest pain  RESP: no SOB  ABD: no abdominal pain  : no dysuria  MSK: +Left leg edema/foot infection    VITALS/PHYSICAL EXAM  --------------------------------------------------------------------------------  T(C): 36.8 (03-11-18 @ 12:35), Max: 37 (03-10-18 @ 21:01)  HR: 69 (03-11-18 @ 12:35) (60 - 69)  BP: 161/68 (03-11-18 @ 12:35) (139/62 - 161/68)  RR: 19 (03-11-18 @ 12:35) (18 - 19)  SpO2: 98% (03-11-18 @ 12:35) (95% - 98%)  Wt(kg): --        03-10-18 @ 06:01  -  03-11-18 @ 07:00  --------------------------------------------------------  IN: 360 mL / OUT: 600 mL / NET: -240 mL    03-11-18 @ 07:01  -  03-11-18 @ 12:57  --------------------------------------------------------  IN: 120 mL / OUT: 0 mL / NET: 120 mL      Physical Exam:  	Gen: Resting in bed   	HEENT: No JVD  	Pulm: CTA B/L  	CV: S1S2+  	Abd: soft  	LE: Right BKA, LLE edema present, left foot dressing seen   	Neuro: Awake and alert   	Psych: Normal affect and mood  	Skin: Warm      LABS/STUDIES  --------------------------------------------------------------------------------              7.5    4.56  >-----------<  242      [03-11-18 @ 06:45]              23.4     143  |  103  |  71  ----------------------------<  86      [03-11-18 @ 06:45]  4.4   |  25  |  4.48        Ca     8.0     [03-11-18 @ 06:45]      PT/INR: PT 13.3 , INR 1.15       [03-11-18 @ 08:01]  PTT: 38.1       [03-11-18 @ 08:01]      Creatinine Trend:  SCr 4.48 [03-11 @ 06:45]  SCr 4.45 [03-10 @ 06:40]  SCr 4.15 [03-09 @ 06:30]  SCr 4.63 [03-08 @ 06:00]  SCr 4.66 [03-07 @ 08:22]

## 2018-03-12 LAB
BUN SERPL-MCNC: 67 MG/DL — HIGH (ref 7–23)
CALCIUM SERPL-MCNC: 8.4 MG/DL — SIGNIFICANT CHANGE UP (ref 8.4–10.5)
CHLORIDE SERPL-SCNC: 101 MMOL/L — SIGNIFICANT CHANGE UP (ref 98–107)
CO2 SERPL-SCNC: 24 MMOL/L — SIGNIFICANT CHANGE UP (ref 22–31)
CREAT SERPL-MCNC: 4.48 MG/DL — HIGH (ref 0.5–1.3)
GLUCOSE BLDC GLUCOMTR-MCNC: 139 MG/DL — HIGH (ref 70–99)
GLUCOSE BLDC GLUCOMTR-MCNC: 180 MG/DL — HIGH (ref 70–99)
GLUCOSE SERPL-MCNC: 85 MG/DL — SIGNIFICANT CHANGE UP (ref 70–99)
HCT VFR BLD CALC: 24.8 % — LOW (ref 39–50)
HGB BLD-MCNC: 7.8 G/DL — LOW (ref 13–17)
INR BLD: 1.21 — HIGH (ref 0.88–1.17)
MCHC RBC-ENTMCNC: 27.4 PG — SIGNIFICANT CHANGE UP (ref 27–34)
MCHC RBC-ENTMCNC: 31.5 % — LOW (ref 32–36)
MCV RBC AUTO: 87 FL — SIGNIFICANT CHANGE UP (ref 80–100)
NRBC # FLD: 0 — SIGNIFICANT CHANGE UP
PLATELET # BLD AUTO: 261 K/UL — SIGNIFICANT CHANGE UP (ref 150–400)
PMV BLD: 11.8 FL — SIGNIFICANT CHANGE UP (ref 7–13)
POTASSIUM SERPL-MCNC: 5 MMOL/L — SIGNIFICANT CHANGE UP (ref 3.5–5.3)
POTASSIUM SERPL-SCNC: 5 MMOL/L — SIGNIFICANT CHANGE UP (ref 3.5–5.3)
PROTHROM AB SERPL-ACNC: 13.5 SEC — HIGH (ref 9.8–13.1)
RBC # BLD: 2.85 M/UL — LOW (ref 4.2–5.8)
RBC # FLD: 14.3 % — SIGNIFICANT CHANGE UP (ref 10.3–14.5)
SODIUM SERPL-SCNC: 142 MMOL/L — SIGNIFICANT CHANGE UP (ref 135–145)
WBC # BLD: 4.66 K/UL — SIGNIFICANT CHANGE UP (ref 3.8–10.5)
WBC # FLD AUTO: 4.66 K/UL — SIGNIFICANT CHANGE UP (ref 3.8–10.5)

## 2018-03-12 PROCEDURE — 99232 SBSQ HOSP IP/OBS MODERATE 35: CPT

## 2018-03-12 PROCEDURE — 99233 SBSQ HOSP IP/OBS HIGH 50: CPT | Mod: GC

## 2018-03-12 RX ADMIN — Medication 300 MILLIGRAM(S): at 05:17

## 2018-03-12 RX ADMIN — Medication 60 MILLIGRAM(S): at 05:17

## 2018-03-12 RX ADMIN — HEPARIN SODIUM 5000 UNIT(S): 5000 INJECTION INTRAVENOUS; SUBCUTANEOUS at 21:57

## 2018-03-12 RX ADMIN — Medication 650 MILLIGRAM(S): at 05:17

## 2018-03-12 RX ADMIN — HEPARIN SODIUM 5000 UNIT(S): 5000 INJECTION INTRAVENOUS; SUBCUTANEOUS at 14:02

## 2018-03-12 RX ADMIN — Medication 1 TABLET(S): at 12:52

## 2018-03-12 RX ADMIN — PREGABALIN 1000 MICROGRAM(S): 225 CAPSULE ORAL at 12:53

## 2018-03-12 RX ADMIN — Medication 1 TABLET(S): at 12:53

## 2018-03-12 RX ADMIN — Medication 300 MILLIGRAM(S): at 14:03

## 2018-03-12 RX ADMIN — Medication 81 MILLIGRAM(S): at 12:53

## 2018-03-12 RX ADMIN — Medication 1 APPLICATION(S): at 12:53

## 2018-03-12 RX ADMIN — ATORVASTATIN CALCIUM 80 MILLIGRAM(S): 80 TABLET, FILM COATED ORAL at 21:57

## 2018-03-12 RX ADMIN — Medication 1: at 17:06

## 2018-03-12 RX ADMIN — Medication 650 MILLIGRAM(S): at 17:06

## 2018-03-12 NOTE — PROGRESS NOTE ADULT - ATTENDING COMMENTS
I have personally seen and examined patient.   I discussed the case with Dr. Nunez and I agree with findings and plan as detailed per note above, which I have amended where appropriate.      This is a 63M PMH of DM on januvia, HTN, CKD, Rt BKA in 2009 who p/w sepsis 2/2 cellulitis and LLE SFA disease, c/b ATN, now awaiting Lt fem-pop bypass.  -monitoring creat, avoid nephrotoxins  -s/p acute hypoxic pulm edema - now improved s/p lasix  -vasc sx f/u regarding bypass plans  -wound care

## 2018-03-12 NOTE — PROGRESS NOTE ADULT - PROBLEM SELECTOR PLAN 1
Pt. with JANY on CKD in the setting of infection and diarrhea. CKD in the setting of long-standing DM. Scr was 1.5 in 3/2017, increased to 2.30 in 7/2017. Scr on admission (2/12) was elevated at 3.81, increased to 6.3 on 2/23 and has now improved. Pt. with likely ATN. Pt. underwent vascular study/left leg angiogram on 3/8. Scr is stable at 4.48 today. Pt. at increased risk for radiocontrast nephropathy. Monitor BMP and urine output. Avoid any potential nephrotoxins.

## 2018-03-12 NOTE — PROGRESS NOTE ADULT - PROBLEM SELECTOR PLAN 2
Acidosis in the setting of renal failure and infection. Serum CO2 24 (WNL). Continue with sodium bicarbonate therapy and Monitor serum CO2 Acidosis in the setting of renal failure and infection. Resolved. Serum CO2 24 (WNL). Continue with sodium bicarbonate therapy and Monitor serum CO2

## 2018-03-12 NOTE — PROGRESS NOTE ADULT - SUBJECTIVE AND OBJECTIVE BOX
Vascular Surgery Progress Note  [id number]    Subjective/interval:  -"feels better" this am; can't specify why  -wants surgery      Objective:  Vital Signs Last 24 Hrs  T(C): 36.7 (12 Mar 2018 05:00), Max: 36.8 (11 Mar 2018 12:35)  T(F): 98 (12 Mar 2018 05:00), Max: 98.2 (11 Mar 2018 12:35)  HR: 70 (12 Mar 2018 05:00) (61 - 70)  BP: 152/54 (12 Mar 2018 05:00) (138/55 - 161/68)  BP(mean): --  RR: 18 (12 Mar 2018 05:00) (18 - 19)  SpO2: 94% (12 Mar 2018 05:00) (94% - 98%)      I&O's Summary    11 Mar 2018 07:01  -  12 Mar 2018 07:00  --------------------------------------------------------  IN: 540 mL / OUT: 1045 mL / NET: -505 mL    12 Mar 2018 07:01  -  12 Mar 2018 10:57  --------------------------------------------------------  IN: 0 mL / OUT: 175 mL / NET: -175 mL      PE:  Gen: NAD, sitting on edge of bed  Abd: soft, ND  Ext: LLE w/ kerlex dressing in place; s/p R BKA, healed      MEDICATIONS  (STANDING):  aspirin enteric coated 81 milliGRAM(s) Oral daily  atorvastatin 80 milliGRAM(s) Oral at bedtime  cyanocobalamin 1000 MICROGram(s) Oral daily  dextrose 5%. 1000 milliLiter(s) (50 mL/Hr) IV Continuous <Continuous>  dextrose 50% Injectable 12.5 Gram(s) IV Push once  dextrose 50% Injectable 25 Gram(s) IV Push once  dextrose 50% Injectable 25 Gram(s) IV Push once  heparin  Injectable 5000 Unit(s) SubCutaneous every 8 hours  influenza   Vaccine 0.5 milliLiter(s) IntraMuscular once  insulin lispro (HumaLOG) corrective regimen sliding scale   SubCutaneous three times a day before meals  insulin lispro (HumaLOG) corrective regimen sliding scale   SubCutaneous <User Schedule>  labetalol 300 milliGRAM(s) Oral three times a day  lactobacillus acidophilus 1 Tablet(s) Oral daily  multivitamin 1 Tablet(s) Oral daily  NIFEdipine XL 60 milliGRAM(s) Oral daily  silver sulfADIAZINE 1% Cream 1 Application(s) Topical daily  sodium bicarbonate 650 milliGRAM(s) Oral every 12 hours    MEDICATIONS  (PRN):  acetaminophen   Tablet 650 milliGRAM(s) Oral every 6 hours PRN For Temp greater than 38 C (100.4 F)  acetaminophen   Tablet. 650 milliGRAM(s) Oral every 6 hours PRN Moderate Pain (4 - 6)  dextrose Gel 1 Dose(s) Oral once PRN Blood Glucose LESS THAN 70 milliGRAM(s)/deciliter  glucagon  Injectable 1 milliGRAM(s) IntraMuscular once PRN Glucose LESS THAN 70 milligrams/deciliter        Labs:                        7.8    4.66  )-----------( 261      ( 12 Mar 2018 05:13 )             24.8     03-12    142  |  101  |  67<H>  ----------------------------<  85  5.0   |  24  |  4.48<H>    Ca    8.4      12 Mar 2018 05:13    PT/INR - ( 12 Mar 2018 05:13 )   PT: 13.5 SEC;   INR: 1.21     PTT - ( 11 Mar 2018 08:01 )  PTT:38.1 SEC          Imaging:  < from: [facility] Duplex Physiol Ext Low 3+ Level, BI. (02.13.18 @ 14:12) >  Right Findings: History of right lower extremity  amputation.  Left Findings: The ankle-brachial index (KENN) on the left  is mildly decreased (0.71).  The toe-brachial index (TBI) on the left was not assessed  (overlying open wounds and  dressings).  Pulse volume recording (PVR) waveforms appear triphasic  with normal amplitudes at the left thigh, calf and ankle.  Waveforms are reduced in amplitude at the metatarsal and  digit segments.  These findings suggest the presence of  small vessel arterial disease.  ------------------------------------------------------------------------  Summary/Impressions:  Left lower extremity KENN is mildly decreased (0.71).  Left  TBI was not assessed.  Waveform analysis suggest the  presence of small vessel arterial disease.    < end of copied text >    < from: [facility] Duplex Ext Veins Lower Comp, Bilat. (03.09.18 @ 10:47) >  Summary/Impressions:  1.  No evidence of deep vein thrombosis in thevisualized  right and left lower extremities.  2.  No evidence of deep and superficial venous  insufficiency noted in the visualized right and left lower  extremities.    < end of copied text >    < from: [facility] Duplex Ext Veins Lower Comp, Bilat. (03.09.18 @ 16:18) >  Summary/Impressions:  No evidence of thrombosis or significant venous wall  thickening noted in the visualized bilateral great  saphenous veins.  Vesseldiameters as noted above.    < end of copied text >    < from: Cardiac Cath Lab - Adult (03.08.18 @ 11:36) >   Distal left popliteal: Angiography  showed mild atherosclerosis. Ostial left anterior tibial: There was a 100  % stenosis. Proximal left anterior tibial: There was a 100 % stenosis. Mid  left anterior tibial: There was a 100 % stenosis. Distal left anterior  tibial: There was a 100 % stenosis. the dorsalis pedis artery is occluded  there is moderate diffuse small vessel dz of the entire foot    < end of copied text >    < from: Transthoracic Echocardiogram (03.02.18 @ 11:03) >  CONCLUSIONS:  1. Mitral annular calcification, otherwise normal mitral  valve. Mild mitral regurgitation.  2. Aortic valve leaflet morphology not well visualized.  Peak transaortic valve gradient equals 19 mm Hg, mean  transaortic valve gradient equals 11 mm Hg, consistent with  probable mild aortic stenosis. Minimal aortic  regurgitation.  3. Normal left ventricular internal dimensions and wall  thicknesses.  4. Endocardium not well visualized; grossly normal left  ventricular systolic function.  5. The right ventricle is not well visualized; grossly  normal right ventricular systolic function.  6. Estimated right ventricular systolic pressure equals 57  mm Hg, assuming right atrial pressure equals 10 mm Hg,  consistent with moderate pulmonary hypertension.  7. Bilateral pleural effusions.    < end of copied text > Vascular Surgery Progress Note  [id number]    Subjective/interval:  -"feels better" this am; can't specify why  -wants surgery      Objective:  Vital Signs Last 24 Hrs  T(C): 36.7 (12 Mar 2018 05:00), Max: 36.8 (11 Mar 2018 12:35)  T(F): 98 (12 Mar 2018 05:00), Max: 98.2 (11 Mar 2018 12:35)  HR: 70 (12 Mar 2018 05:00) (61 - 70)  BP: 152/54 (12 Mar 2018 05:00) (138/55 - 161/68)  BP(mean): --  RR: 18 (12 Mar 2018 05:00) (18 - 19)  SpO2: 94% (12 Mar 2018 05:00) (94% - 98%)      I&O's Summary    11 Mar 2018 07:01  -  12 Mar 2018 07:00  --------------------------------------------------------  IN: 540 mL / OUT: 1045 mL / NET: -505 mL    12 Mar 2018 07:01  -  12 Mar 2018 10:57  --------------------------------------------------------  IN: 0 mL / OUT: 175 mL / NET: -175 mL      PE:  Gen: NAD, sitting on edge of bed  Abd: soft, ND  Ext: LLE w/ kerlex dressing in place; s/p R BKA, healed left toe wound stable      MEDICATIONS  (STANDING):  aspirin enteric coated 81 milliGRAM(s) Oral daily  atorvastatin 80 milliGRAM(s) Oral at bedtime  cyanocobalamin 1000 MICROGram(s) Oral daily  dextrose 5%. 1000 milliLiter(s) (50 mL/Hr) IV Continuous <Continuous>  dextrose 50% Injectable 12.5 Gram(s) IV Push once  dextrose 50% Injectable 25 Gram(s) IV Push once  dextrose 50% Injectable 25 Gram(s) IV Push once  heparin  Injectable 5000 Unit(s) SubCutaneous every 8 hours  influenza   Vaccine 0.5 milliLiter(s) IntraMuscular once  insulin lispro (HumaLOG) corrective regimen sliding scale   SubCutaneous three times a day before meals  insulin lispro (HumaLOG) corrective regimen sliding scale   SubCutaneous <User Schedule>  labetalol 300 milliGRAM(s) Oral three times a day  lactobacillus acidophilus 1 Tablet(s) Oral daily  multivitamin 1 Tablet(s) Oral daily  NIFEdipine XL 60 milliGRAM(s) Oral daily  silver sulfADIAZINE 1% Cream 1 Application(s) Topical daily  sodium bicarbonate 650 milliGRAM(s) Oral every 12 hours    MEDICATIONS  (PRN):  acetaminophen   Tablet 650 milliGRAM(s) Oral every 6 hours PRN For Temp greater than 38 C (100.4 F)  acetaminophen   Tablet. 650 milliGRAM(s) Oral every 6 hours PRN Moderate Pain (4 - 6)  dextrose Gel 1 Dose(s) Oral once PRN Blood Glucose LESS THAN 70 milliGRAM(s)/deciliter  glucagon  Injectable 1 milliGRAM(s) IntraMuscular once PRN Glucose LESS THAN 70 milligrams/deciliter        Labs:                        7.8    4.66  )-----------( 261      ( 12 Mar 2018 05:13 )             24.8     03-12    142  |  101  |  67<H>  ----------------------------<  85  5.0   |  24  |  4.48<H>    Ca    8.4      12 Mar 2018 05:13    PT/INR - ( 12 Mar 2018 05:13 )   PT: 13.5 SEC;   INR: 1.21     PTT - ( 11 Mar 2018 08:01 )  PTT:38.1 SEC          Imaging:  < from: [facility] Duplex Physiol Ext Low 3+ Level, BI. (02.13.18 @ 14:12) >  Right Findings: History of right lower extremity  amputation.  Left Findings: The ankle-brachial index (KENN) on the left  is mildly decreased (0.71).  The toe-brachial index (TBI) on the left was not assessed  (overlying open wounds and  dressings).  Pulse volume recording (PVR) waveforms appear triphasic  with normal amplitudes at the left thigh, calf and ankle.  Waveforms are reduced in amplitude at the metatarsal and  digit segments.  These findings suggest the presence of  small vessel arterial disease.  ------------------------------------------------------------------------  Summary/Impressions:  Left lower extremity KENN is mildly decreased (0.71).  Left  TBI was not assessed.  Waveform analysis suggest the  presence of small vessel arterial disease.    < end of copied text >    < from: [facility] Duplex Ext Veins Lower Comp, Bilat. (03.09.18 @ 10:47) >  Summary/Impressions:  1.  No evidence of deep vein thrombosis in thevisualized  right and left lower extremities.  2.  No evidence of deep and superficial venous  insufficiency noted in the visualized right and left lower  extremities.    < end of copied text >    < from: [facility] Duplex Ext Veins Lower Comp, Bilat. (03.09.18 @ 16:18) >  Summary/Impressions:  No evidence of thrombosis or significant venous wall  thickening noted in the visualized bilateral great  saphenous veins.  Vesseldiameters as noted above.    < end of copied text >    < from: Cardiac Cath Lab - Adult (03.08.18 @ 11:36) >   Distal left popliteal: Angiography  showed mild atherosclerosis. Ostial left anterior tibial: There was a 100  % stenosis. Proximal left anterior tibial: There was a 100 % stenosis. Mid  left anterior tibial: There was a 100 % stenosis. Distal left anterior  tibial: There was a 100 % stenosis. the dorsalis pedis artery is occluded  there is moderate diffuse small vessel dz of the entire foot    < end of copied text >    < from: Transthoracic Echocardiogram (03.02.18 @ 11:03) >  CONCLUSIONS:  1. Mitral annular calcification, otherwise normal mitral  valve. Mild mitral regurgitation.  2. Aortic valve leaflet morphology not well visualized.  Peak transaortic valve gradient equals 19 mm Hg, mean  transaortic valve gradient equals 11 mm Hg, consistent with  probable mild aortic stenosis. Minimal aortic  regurgitation.  3. Normal left ventricular internal dimensions and wall  thicknesses.  4. Endocardium not well visualized; grossly normal left  ventricular systolic function.  5. The right ventricle is not well visualized; grossly  normal right ventricular systolic function.  6. Estimated right ventricular systolic pressure equals 57  mm Hg, assuming right atrial pressure equals 10 mm Hg,  consistent with moderate pulmonary hypertension.  7. Bilateral pleural effusions.    < end of copied text >

## 2018-03-12 NOTE — PROGRESS NOTE ADULT - ASSESSMENT
63M PMH DM on januvia, FS 80s at home, HTN, CKD, R BKA in 2009 presented with cellulitis and sepsis c/b ATN which has improved and diastolic heart failure requiring intermittentl lasix. Pt found to have severe LLE SFA disease, now pending LLE fem-pop bypass.

## 2018-03-12 NOTE — PROGRESS NOTE ADULT - SUBJECTIVE AND OBJECTIVE BOX
[facility] DIVISION OF KIDNEY DISEASES AND HYPERTENSION -- FOLLOW UP NOTE  --------------------------------------------------------------------------------    HPI: 63-year-old male with PMH of HTN, DM, right BKA, and CKD admitted for left foot infection. As per review of labs on [place]/[employer], Scr was 1.51 in 3/2017. As per PMD's office, Scr checked in 7/2017 was 2.30. Labs on admission (2/12) showed elevated Scr of 3.8 which peaked to 6.3 on 2/23 and is stable at 4.48 today. Pt. underwent vascular study/left leg angiogram on 3/8. Patient seen and examined today. Pt. denies SOB, CP or N/V.     PAST HISTORY  --------------------------------------------------------------------------------  No significant changes to PMH, PSH, FHx, SHx, unless otherwise noted    ALLERGIES & MEDICATIONS  --------------------------------------------------------------------------------  Allergies    No Known Allergies    Intolerances      Standing Inpatient Medications  aspirin enteric coated 81 milliGRAM(s) Oral daily  atorvastatin 80 milliGRAM(s) Oral at bedtime  cyanocobalamin 1000 MICROGram(s) Oral daily  dextrose 5%. 1000 milliLiter(s) IV Continuous <Continuous>  dextrose 50% Injectable 12.5 Gram(s) IV Push once  dextrose 50% Injectable 25 Gram(s) IV Push once  dextrose 50% Injectable 25 Gram(s) IV Push once  heparin  Injectable 5000 Unit(s) SubCutaneous every 8 hours  influenza   Vaccine 0.5 milliLiter(s) IntraMuscular once  insulin lispro (HumaLOG) corrective regimen sliding scale   SubCutaneous three times a day before meals  insulin lispro (HumaLOG) corrective regimen sliding scale   SubCutaneous <User Schedule>  labetalol 300 milliGRAM(s) Oral three times a day  lactobacillus acidophilus 1 Tablet(s) Oral daily  multivitamin 1 Tablet(s) Oral daily  NIFEdipine XL 60 milliGRAM(s) Oral daily  silver sulfADIAZINE 1% Cream 1 Application(s) Topical daily  sodium bicarbonate 650 milliGRAM(s) Oral every 12 hours    PRN Inpatient Medications  acetaminophen   Tablet 650 milliGRAM(s) Oral every 6 hours PRN  acetaminophen   Tablet. 650 milliGRAM(s) Oral every 6 hours PRN  dextrose Gel 1 Dose(s) Oral once PRN  glucagon  Injectable 1 milliGRAM(s) IntraMuscular once PRN      REVIEW OF SYSTEMS  --------------------------------------------------------------------------------  CVS: no chest pain  RESP: no SOB  ABD: no abdominal pain  : no dysuria  MSK: +Left leg edema/foot infection    All other systems were reviewed and are negative, except as noted.    VITALS/PHYSICAL EXAM  --------------------------------------------------------------------------------  T(C): 36.7 (03-12-18 @ 05:00), Max: 36.8 (03-11-18 @ 12:35)  HR: 70 (03-12-18 @ 05:00) (61 - 70)  BP: 152/54 (03-12-18 @ 05:00) (138/55 - 161/68)  RR: 18 (03-12-18 @ 05:00) (18 - 19)  SpO2: 94% (03-12-18 @ 05:00) (94% - 98%)  Wt(kg): --        03-11-18 @ 07:01  -  03-12-18 @ 07:00  --------------------------------------------------------  IN: 540 mL / OUT: 1045 mL / NET: -505 mL    03-12-18 @ 07:01  -  03-12-18 @ 12:05  --------------------------------------------------------  IN: 0 mL / OUT: 175 mL / NET: -175 mL    Physical Exam:  	Gen: Resting in bed   	HEENT: No JVD  	Pulm: CTA B/L  	CV: S1S2+  	Abd: soft  	LE: Right BKA, LLE edema present, left foot dressing seen   	Neuro: Awake and alert   	Psych: Normal affect and mood  	Skin: Warm    LABS/STUDIES  --------------------------------------------------------------------------------              7.8    4.66  >-----------<  261      [03-12-18 @ 05:13]              24.8     142  |  101  |  67  ----------------------------<  85      [03-12-18 @ 05:13]  5.0   |  24  |  4.48        Ca     8.4     [03-12-18 @ 05:13]      PT/INR: PT 13.5 , INR 1.21       [03-12-18 @ 05:13]  PTT: 38.1       [03-11-18 @ 08:01]      Creatinine Trend:  SCr 4.48 [03-12 @ 05:13]  SCr 4.48 [03-11 @ 06:45]  SCr 4.45 [03-10 @ 06:40]  SCr 4.15 [03-09 @ 06:30]  SCr 4.63 [03-08 @ 06:00]    Urinalysis - [02-25-18 @ 04:50]      Color PLYEL / Appearance CLEAR / SG 1.015 / pH 5.5      Gluc NEGATIVE / Ketone NEGATIVE  / Bili NEGATIVE / Urobili NORMAL       Blood TRACE / Protein 100 / Leuk Est TRACE / Nitrite NEGATIVE      RBC 0-2 / WBC 2-5 / Hyaline  / Gran  / Sq Epi OCC / Non Sq Epi  / Bacteria       Iron 21, TIBC 163, %sat --      [02-20-18 @ 05:00]  Ferritin 540.7      [02-20-18 @ 05:00]  HbA1c 5.9      [02-13-18 @ 07:11]  TSH 3.22      [03-08-18 @ 06:00]      Free Light Chains: kappa 20.50, lambda 11.20, ratio = 1.83 H      [02-25 @ 07:43]

## 2018-03-12 NOTE — PROGRESS NOTE ADULT - ASSESSMENT
63M PMHx DM, s/p R BKA, now presenting with Diabetic foot soft tissue infection, w/ angiography showing arterial stenosis of LLE.    #Plan for OR on Thursday 3/15/18    -NPO Mdn Wednesday  -pre-op labs (CBC, BMP/Mg/Phos, INR/PT/PTT, T&S)    -Wound care as per podiatry      (Made primary medicine team 3 aware (spectra [id number]) & patient.)    KAREN Reynolds MD (PGY2)    (Please page [phone] for questions/concerns.)

## 2018-03-12 NOTE — PROGRESS NOTE ADULT - PROBLEM SELECTOR PLAN 5
S/p angiogram. With multi vessel disease s/p LE dopplers, for vein mapping for grafts.  -For Femoral-popliteal bypass today  C/w ASA and statin

## 2018-03-12 NOTE — PROGRESS NOTE ADULT - PROBLEM SELECTOR PLAN 4
-S/p angiogram. Scheduled for fem-pop bypass today. Would help with wound healing. Consented via surrogate, Eres (patient's brother) & patient agreeable.  -L foot with cellulitis with resulting sepsis which is now improved since admission  S/p vancomycin and zosyn. Completed Unasyn on 2/22.  Podiatry has evaluated patient and report good improvement. recs appreciated.    Wound culture grew Acinetobacter, corynebacterium, staph haemolyticus and staph aureus. Blood cx with NGTD.  -LLE xrays negative for free air; CT read negative for free air.

## 2018-03-12 NOTE — PROGRESS NOTE ADULT - SUBJECTIVE AND OBJECTIVE BOX
Patient is a 63y old  Male who presents with a chief complaint of 63M PMH DM, HTN, CKD p/w L foot pain x 3 days. (19 Feb 2018 10:39)      SUBJECTIVE / OVERNIGHT EVENTS: No acute overnight events. Patient denies chest pain/SOB/worsening LE pain. NPO for fem-pop bypass this AM.     MEDICATIONS  (STANDING):  aspirin enteric coated 81 milliGRAM(s) Oral daily  atorvastatin 80 milliGRAM(s) Oral at bedtime  cyanocobalamin 1000 MICROGram(s) Oral daily  dextrose 5%. 1000 milliLiter(s) (50 mL/Hr) IV Continuous <Continuous>  dextrose 50% Injectable 12.5 Gram(s) IV Push once  dextrose 50% Injectable 25 Gram(s) IV Push once  dextrose 50% Injectable 25 Gram(s) IV Push once  heparin  Injectable 5000 Unit(s) SubCutaneous every 8 hours  influenza   Vaccine 0.5 milliLiter(s) IntraMuscular once  insulin lispro (HumaLOG) corrective regimen sliding scale   SubCutaneous three times a day before meals  insulin lispro (HumaLOG) corrective regimen sliding scale   SubCutaneous <User Schedule>  labetalol 300 milliGRAM(s) Oral three times a day  lactobacillus acidophilus 1 Tablet(s) Oral daily  multivitamin 1 Tablet(s) Oral daily  NIFEdipine XL 60 milliGRAM(s) Oral daily  silver sulfADIAZINE 1% Cream 1 Application(s) Topical daily  sodium bicarbonate 650 milliGRAM(s) Oral every 12 hours    MEDICATIONS  (PRN):  acetaminophen   Tablet 650 milliGRAM(s) Oral every 6 hours PRN For Temp greater than 38 C (100.4 F)  acetaminophen   Tablet. 650 milliGRAM(s) Oral every 6 hours PRN Moderate Pain (4 - 6)  dextrose Gel 1 Dose(s) Oral once PRN Blood Glucose LESS THAN 70 milliGRAM(s)/deciliter  glucagon  Injectable 1 milliGRAM(s) IntraMuscular once PRN Glucose LESS THAN 70 milligrams/deciliter        CAPILLARY BLOOD GLUCOSE      POCT Blood Glucose.: 100 mg/dL (12 Mar 2018 05:14)  POCT Blood Glucose.: 124 mg/dL (11 Mar 2018 23:19)  POCT Blood Glucose.: 143 mg/dL (11 Mar 2018 17:10)  POCT Blood Glucose.: 104 mg/dL (11 Mar 2018 11:59)    I&O's Summary    11 Mar 2018 07:01  -  12 Mar 2018 07:00  --------------------------------------------------------  IN: 540 mL / OUT: 1045 mL / NET: -505 mL    12 Mar 2018 07:01  -  12 Mar 2018 10:27  --------------------------------------------------------  IN: 0 mL / OUT: 175 mL / NET: -175 mL        PHYSICAL EXAM:  GENERAL: elderly male in no acute distress   HEAD:  Atraumatic, Normocephalic  EYES: EOMI, PERRLA,  NECK: Supple, No JVD  CHEST/LUNG: Clear to auscultation bilaterally; No wheeze  HEART: Regular rate and rhythm; No murmurs, rubs, or gallops  ABDOMEN: Soft, Nontender, Nondistended; Bowel sounds present  EXTREMITIES:  2+ Peripheral Pulses, s/p right BKA; Left unable to palpate DP pulse, warm to touch.   PSYCH: AAOx3  NEUROLOGY: non-focal  SKIN: chronic venous stasis changes; blister left foot; dressed.     LABS:  (03-12 @ 05:13)                        7.8  4.66 )-----------( 261                 24.8    Neutrophils = -- (--%)  Lymphocytes = -- (--%)  Eosinophils = -- (--%)  Basophils = -- (--%)  Monocytes = -- (--%)  Bands = --%    WBC Trend: 4.66<--, 4.56<--, 4.37<--  Hb Trend: 7.8<--, 7.5<--, 7.5<--, 7.3<--  Plt Trend: 261<--, 242<--, 245<--, 277<--  03-12    142  |  101  |  67<H>  ----------------------------<  85  5.0   |  24  |  4.48<H>    Ca    8.4      12 Mar 2018 05:13      Creatinine Trend: 4.48<--, 4.48<--, 4.45<--, 4.15<--, 4.63<--, 4.66<--  ( 12 Mar 2018 05:13 )   PT: 13.5 SEC;   INR: 1.21 ;       PTT:38.1 SEC          Microbiology:  Urine Cx:  Blood Cx:    RADIOLOGY & ADDITIONAL TESTS:  No new imaging in the past 24 hrs.     Consultant(s) Notes Reviewed:  Podiatry; nephrology

## 2018-03-12 NOTE — PROGRESS NOTE ADULT - PROBLEM SELECTOR PLAN 3
Likely due to uremic acidosis with accumulate of organic products  - HCO3 wnl   - Decreased Bicarb to 650mg BID per renal

## 2018-03-12 NOTE — PROGRESS NOTE ADULT - PROBLEM SELECTOR PLAN 2
Likely due to pulmonary edema given acuity and CXR confirming such; possibly due to acute on chronic HFpEF in the setting of JANY. Now improved s/p lasix yesterday.  - Monitor I's and O's & oxygen saturation  - no s/s of fluid overload this AM; will monitor post op for any pulmonary edema

## 2018-03-13 LAB
APTT BLD: 42.7 SEC — HIGH (ref 27.5–37.4)
BASOPHILS # BLD AUTO: 0.04 K/UL — SIGNIFICANT CHANGE UP (ref 0–0.2)
BASOPHILS NFR BLD AUTO: 0.9 % — SIGNIFICANT CHANGE UP (ref 0–2)
BUN SERPL-MCNC: 62 MG/DL — HIGH (ref 7–23)
CALCIUM SERPL-MCNC: 8.2 MG/DL — LOW (ref 8.4–10.5)
CHLORIDE SERPL-SCNC: 104 MMOL/L — SIGNIFICANT CHANGE UP (ref 98–107)
CO2 SERPL-SCNC: 26 MMOL/L — SIGNIFICANT CHANGE UP (ref 22–31)
CREAT SERPL-MCNC: 4.44 MG/DL — HIGH (ref 0.5–1.3)
EOSINOPHIL # BLD AUTO: 0.32 K/UL — SIGNIFICANT CHANGE UP (ref 0–0.5)
EOSINOPHIL NFR BLD AUTO: 7.4 % — HIGH (ref 0–6)
EPO SERPL-MCNC: 26.7 MIU/ML — HIGH (ref 2.6–18.5)
GLUCOSE BLDC GLUCOMTR-MCNC: 112 MG/DL — HIGH (ref 70–99)
GLUCOSE BLDC GLUCOMTR-MCNC: 158 MG/DL — HIGH (ref 70–99)
GLUCOSE BLDC GLUCOMTR-MCNC: 84 MG/DL — SIGNIFICANT CHANGE UP (ref 70–99)
GLUCOSE BLDC GLUCOMTR-MCNC: 94 MG/DL — SIGNIFICANT CHANGE UP (ref 70–99)
GLUCOSE SERPL-MCNC: 74 MG/DL — SIGNIFICANT CHANGE UP (ref 70–99)
HCT VFR BLD CALC: 23.3 % — LOW (ref 39–50)
HGB BLD-MCNC: 7.5 G/DL — LOW (ref 13–17)
IMM GRANULOCYTES # BLD AUTO: 0.01 # — SIGNIFICANT CHANGE UP
IMM GRANULOCYTES NFR BLD AUTO: 0.2 % — SIGNIFICANT CHANGE UP (ref 0–1.5)
INR BLD: 1.18 — HIGH (ref 0.88–1.17)
LYMPHOCYTES # BLD AUTO: 1.04 K/UL — SIGNIFICANT CHANGE UP (ref 1–3.3)
LYMPHOCYTES # BLD AUTO: 23.9 % — SIGNIFICANT CHANGE UP (ref 13–44)
MAGNESIUM SERPL-MCNC: 1.9 MG/DL — SIGNIFICANT CHANGE UP (ref 1.6–2.6)
MCHC RBC-ENTMCNC: 28 PG — SIGNIFICANT CHANGE UP (ref 27–34)
MCHC RBC-ENTMCNC: 32.2 % — SIGNIFICANT CHANGE UP (ref 32–36)
MCV RBC AUTO: 86.9 FL — SIGNIFICANT CHANGE UP (ref 80–100)
MONOCYTES # BLD AUTO: 0.54 K/UL — SIGNIFICANT CHANGE UP (ref 0–0.9)
MONOCYTES NFR BLD AUTO: 12.4 % — SIGNIFICANT CHANGE UP (ref 2–14)
NEUTROPHILS # BLD AUTO: 2.4 K/UL — SIGNIFICANT CHANGE UP (ref 1.8–7.4)
NEUTROPHILS NFR BLD AUTO: 55.2 % — SIGNIFICANT CHANGE UP (ref 43–77)
NRBC # FLD: 0 — SIGNIFICANT CHANGE UP
PHOSPHATE SERPL-MCNC: 4.6 MG/DL — HIGH (ref 2.5–4.5)
PLATELET # BLD AUTO: 237 K/UL — SIGNIFICANT CHANGE UP (ref 150–400)
PMV BLD: 11.3 FL — SIGNIFICANT CHANGE UP (ref 7–13)
POTASSIUM SERPL-MCNC: 4.4 MMOL/L — SIGNIFICANT CHANGE UP (ref 3.5–5.3)
POTASSIUM SERPL-SCNC: 4.4 MMOL/L — SIGNIFICANT CHANGE UP (ref 3.5–5.3)
PROTHROM AB SERPL-ACNC: 13.6 SEC — HIGH (ref 9.8–13.1)
RBC # BLD: 2.68 M/UL — LOW (ref 4.2–5.8)
RBC # FLD: 14.2 % — SIGNIFICANT CHANGE UP (ref 10.3–14.5)
SODIUM SERPL-SCNC: 144 MMOL/L — SIGNIFICANT CHANGE UP (ref 135–145)
WBC # BLD: 4.35 K/UL — SIGNIFICANT CHANGE UP (ref 3.8–10.5)
WBC # FLD AUTO: 4.35 K/UL — SIGNIFICANT CHANGE UP (ref 3.8–10.5)

## 2018-03-13 PROCEDURE — 99233 SBSQ HOSP IP/OBS HIGH 50: CPT | Mod: GC

## 2018-03-13 PROCEDURE — 71045 X-RAY EXAM CHEST 1 VIEW: CPT | Mod: 26

## 2018-03-13 PROCEDURE — 99232 SBSQ HOSP IP/OBS MODERATE 35: CPT

## 2018-03-13 RX ORDER — HEPARIN SODIUM 5000 [USP'U]/ML
5000 INJECTION INTRAVENOUS; SUBCUTANEOUS EVERY 8 HOURS
Refills: 0 | Status: DISCONTINUED | OUTPATIENT
Start: 2018-03-13 | End: 2018-03-27

## 2018-03-13 RX ADMIN — Medication 60 MILLIGRAM(S): at 05:15

## 2018-03-13 RX ADMIN — HEPARIN SODIUM 5000 UNIT(S): 5000 INJECTION INTRAVENOUS; SUBCUTANEOUS at 14:59

## 2018-03-13 RX ADMIN — Medication 1: at 12:18

## 2018-03-13 RX ADMIN — PREGABALIN 1000 MICROGRAM(S): 225 CAPSULE ORAL at 12:19

## 2018-03-13 RX ADMIN — HEPARIN SODIUM 5000 UNIT(S): 5000 INJECTION INTRAVENOUS; SUBCUTANEOUS at 05:15

## 2018-03-13 RX ADMIN — Medication 300 MILLIGRAM(S): at 05:15

## 2018-03-13 RX ADMIN — HEPARIN SODIUM 5000 UNIT(S): 5000 INJECTION INTRAVENOUS; SUBCUTANEOUS at 21:43

## 2018-03-13 RX ADMIN — Medication 650 MILLIGRAM(S): at 05:15

## 2018-03-13 RX ADMIN — Medication 1 TABLET(S): at 12:18

## 2018-03-13 RX ADMIN — Medication 300 MILLIGRAM(S): at 14:59

## 2018-03-13 RX ADMIN — Medication 1 APPLICATION(S): at 12:19

## 2018-03-13 RX ADMIN — Medication 81 MILLIGRAM(S): at 12:18

## 2018-03-13 RX ADMIN — ATORVASTATIN CALCIUM 80 MILLIGRAM(S): 80 TABLET, FILM COATED ORAL at 21:43

## 2018-03-13 RX ADMIN — Medication 650 MILLIGRAM(S): at 17:59

## 2018-03-13 NOTE — PROGRESS NOTE ADULT - PROBLEM SELECTOR PLAN 5
S/p angiogram. With multi vessel disease s/p LE dopplers, for vein mapping for grafts.  -For Femoral-popliteal bypass today  C/w ASA and statin S/p angiogram. With multi vessel disease s/p LE dopplers, for vein mapping for grafts.  -For Femoral-popliteal bypass as above   C/w ASA and statin

## 2018-03-13 NOTE — PROGRESS NOTE ADULT - ASSESSMENT
LEFT toes, forefoot, midfoot blister (improving).    Plan:  - Pt seen and evaluated  - Appearance of foot continues to improve  - Cont IV abx  - Silvadene to LEFT foot wound daily  - No pod sx intervention at this time  - [facility] planning bypass for Thursday.  - Will cont to follow.

## 2018-03-13 NOTE — PROGRESS NOTE ADULT - PROBLEM SELECTOR PLAN 4
-S/p angiogram. Scheduled for fem-pop bypass today. Would help with wound healing. Consented via surrogate, Eres (patient's brother) & patient agreeable.  -L foot with cellulitis with resulting sepsis which is now improved since admission  S/p vancomycin and zosyn. Completed Unasyn on 2/22.  Podiatry has evaluated patient and report good improvement. recs appreciated.    Wound culture grew Acinetobacter, corynebacterium, staph haemolyticus and staph aureus. Blood cx with NGTD.  -LLE xrays negative for free air; CT read negative for free air. -S/p angiogram. Scheduled for fem-pop bypass 03/15. Would help with wound healing. Consented via surrogate, Eres (patient's brother) & patient agreeable.  -L foot with cellulitis with resulting sepsis which is now improved since admission  S/p vancomycin and zosyn. Completed Unasyn on 2/22.  Podiatry has evaluated patient and report good improvement. recs appreciated.    Wound culture grew Acinetobacter, corynebacterium, staph haemolyticus and staph aureus. Blood cx with NGTD.  -LLE xrays negative for free air; CT read negative for free air.

## 2018-03-13 NOTE — PROGRESS NOTE ADULT - PROBLEM SELECTOR PLAN 8
Pt on oral medications at home.   c/w ISS  Well controlled. Skin normal color for race, warm, dry and intact. No evidence of rash.

## 2018-03-13 NOTE — PROGRESS NOTE ADULT - SUBJECTIVE AND OBJECTIVE BOX
Patient is a 63y old  Male who presents with a chief complaint of 63M PMH DM, HTN, CKD p/w L foot pain x 3 days. (19 Feb 2018 10:39)       INTERVAL HPI/OVERNIGHT EVENTS:  Patient seen and evaluated at bedside.  Pt is resting comfortable in NAD. Denies N/V/F/C.  Pain rated at X/10    Allergies    No Known Allergies    Intolerances        Vital Signs Last 24 Hrs  T(C): 37.5 (13 Mar 2018 04:52), Max: 37.5 (13 Mar 2018 04:52)  T(F): 99.5 (13 Mar 2018 04:52), Max: 99.5 (13 Mar 2018 04:52)  HR: 71 (13 Mar 2018 04:52) (58 - 71)  BP: 156/51 (13 Mar 2018 04:52) (145/62 - 156/51)  BP(mean): --  RR: 18 (13 Mar 2018 04:52) (18 - 18)  SpO2: 95% (13 Mar 2018 04:52) (93% - 95%)    LABS:                        7.5    4.35  )-----------( 237      ( 13 Mar 2018 06:30 )             23.3     03-13    144  |  104  |  62<H>  ----------------------------<  74  4.4   |  26  |  4.44<H>    Ca    8.2<L>      13 Mar 2018 06:30  Phos  4.6     03-13  Mg     1.9     03-13      PT/INR - ( 13 Mar 2018 06:30 )   PT: 13.6 SEC;   INR: 1.18          PTT - ( 13 Mar 2018 06:30 )  PTT:42.7 SEC    CAPILLARY BLOOD GLUCOSE      POCT Blood Glucose.: 139 mg/dL (12 Mar 2018 21:52)  POCT Blood Glucose.: 180 mg/dL (12 Mar 2018 17:03)  POCT Blood Glucose.: 87 mg/dL (12 Mar 2018 12:16)      Lower Extremity Physical Exam:  Vasular: DP/PT 0/4, B/L, CFT <2 seconds B/L, Temperature gradient warm, B/L.   Neuro: Epicritic sensation absent to the level of toes, B/L.  Musculoskeletal/Ortho: RIGHT BKA.  Skin: Left foot deroofed blister with cherry red non-blanchable trophic changes to plantar aspect of toes 1-5, forefoot, midfoot, no ascending erythema or swelling, no malodor, no purulence, no deep probe, etiology unknown, ROM of toes intact, CFT to each toes normal, no sinus tract, no undermining. Overall appearance of foot continues to improve.    RADIOLOGY & ADDITIONAL TESTS:

## 2018-03-13 NOTE — PROGRESS NOTE ADULT - PROBLEM SELECTOR PLAN 6
Hgb has been stable in the 7-8 range.   Folate, B12, TSH all WNL.  Likely 2/2 in setting of decreased EPO production d/t worsening kidney function. Hemolysis less likely. T bili is WNL. Acute blood loss also less likely as no source.  Will continue to monitor every 2 days Hgb has been stable in the 7-8 range.   Folate, B12, TSH all WNL.  Likely 2/2 in setting of decreased EPO production d/t worsening kidney function. Hemolysis less likely. T bili is WNL. Acute blood loss also less likely as no source.

## 2018-03-13 NOTE — PROGRESS NOTE ADULT - ATTENDING COMMENTS
I have personally seen and examined patient.   I discussed the case with Dr. Nunez and I agree with findings and plan as detailed per note above, which I have amended where appropriate.      This is a 63M PMH of DM on januvia, HTN, CKD, Rt BKA in 2009 who p/w sepsis 2/2 cellulitis and LLE SFA disease, c/b ATN, now awaiting Lt fem-pop bypass.  -monitoring creat, avoid nephrotoxins  -resolved acute hypoxic pulm edema - lungs now clear on exam  -planned for fem-pop bypass on 3/15

## 2018-03-13 NOTE — PROGRESS NOTE ADULT - PROBLEM SELECTOR PLAN 9
-DVT ppx: HSQ  -NPO for procedure today -DVT ppx: HSQ  -Diabetic Diet    Dalton Nunez D.O.  PGY-1 EM/IM  Pager #[phone]

## 2018-03-13 NOTE — PROGRESS NOTE ADULT - SUBJECTIVE AND OBJECTIVE BOX
Patient is a 63y old  Male who presents with a chief complaint of 63M PMH DM, HTN, CKD p/w L foot pain x 3 days. (19 Feb 2018 10:39)      SUBJECTIVE / OVERNIGHT EVENTS:    MEDICATIONS  (STANDING):  aspirin enteric coated 81 milliGRAM(s) Oral daily  atorvastatin 80 milliGRAM(s) Oral at bedtime  cyanocobalamin 1000 MICROGram(s) Oral daily  dextrose 5%. 1000 milliLiter(s) (50 mL/Hr) IV Continuous <Continuous>  dextrose 50% Injectable 12.5 Gram(s) IV Push once  dextrose 50% Injectable 25 Gram(s) IV Push once  dextrose 50% Injectable 25 Gram(s) IV Push once  heparin  Injectable 5000 Unit(s) SubCutaneous every 8 hours  influenza   Vaccine 0.5 milliLiter(s) IntraMuscular once  insulin lispro (HumaLOG) corrective regimen sliding scale   SubCutaneous three times a day before meals  labetalol 300 milliGRAM(s) Oral three times a day  lactobacillus acidophilus 1 Tablet(s) Oral daily  multivitamin 1 Tablet(s) Oral daily  NIFEdipine XL 60 milliGRAM(s) Oral daily  silver sulfADIAZINE 1% Cream 1 Application(s) Topical daily  sodium bicarbonate 650 milliGRAM(s) Oral every 12 hours    MEDICATIONS  (PRN):  acetaminophen   Tablet 650 milliGRAM(s) Oral every 6 hours PRN For Temp greater than 38 C (100.4 F)  acetaminophen   Tablet. 650 milliGRAM(s) Oral every 6 hours PRN Moderate Pain (4 - 6)  dextrose Gel 1 Dose(s) Oral once PRN Blood Glucose LESS THAN 70 milliGRAM(s)/deciliter  glucagon  Injectable 1 milliGRAM(s) IntraMuscular once PRN Glucose LESS THAN 70 milligrams/deciliter        CAPILLARY BLOOD GLUCOSE      POCT Blood Glucose.: 139 mg/dL (12 Mar 2018 21:52)  POCT Blood Glucose.: 180 mg/dL (12 Mar 2018 17:03)  POCT Blood Glucose.: 87 mg/dL (12 Mar 2018 12:16)    I&O's Summary    12 Mar 2018 07:01  -  13 Mar 2018 07:00  --------------------------------------------------------  IN: 240 mL / OUT: 1025 mL / NET: -785 mL        PHYSICAL EXAM:  GENERAL: NAD, well-developed  HEAD:  Atraumatic, Normocephalic  EYES: EOMI, PERRLA, conjunctiva and sclera clear  NECK: Supple, No JVD  CHEST/LUNG: Clear to auscultation bilaterally; No wheeze  HEART: Regular rate and rhythm; No murmurs, rubs, or gallops  ABDOMEN: Soft, Nontender, Nondistended; Bowel sounds present  EXTREMITIES:  2+ Peripheral Pulses, No clubbing, cyanosis, or edema  PSYCH: AAOx3  NEUROLOGY: non-focal  SKIN: No rashes or lesions    LABS:    WBC Trend: 4.66<--, 4.56<--, 4.37<--  Hb Trend: 7.8<--, 7.5<--, 7.5<--, 7.3<--  Plt Trend: 261<--, 242<--, 245<--, 277<--  03-12    142  |  101  |  67<H>  ----------------------------<  85  5.0   |  24  |  4.48<H>    Ca    8.4      12 Mar 2018 05:13      Creatinine Trend: 4.48<--, 4.48<--, 4.45<--, 4.15<--, 4.63<--, 4.66<--  ( 12 Mar 2018 05:13 )   PT: 13.5 SEC;   INR: 1.21 ;       PTT:38.1 SEC          Microbiology:  Urine Cx:  Blood Cx:    RADIOLOGY & ADDITIONAL TESTS:  X- Ray:  CT:  Ultrasound:  [ ] imaging personally reviewed and interpreted by me    Consultant(s) Notes Reviewed:      Care Discussed with Consultants/Other Providers: Patient is a 63y old  Male who presents with a chief complaint of 63M PMH DM, HTN, CKD p/w L foot pain x 3 days. (19 Feb 2018 10:39)      SUBJECTIVE / OVERNIGHT EVENTS: No acute overnight events. Reports no pain in LLE. BG well controlled. Femoral-popliteal bypass rescheduled for 03/15.     MEDICATIONS  (STANDING):  aspirin enteric coated 81 milliGRAM(s) Oral daily  atorvastatin 80 milliGRAM(s) Oral at bedtime  cyanocobalamin 1000 MICROGram(s) Oral daily  dextrose 5%. 1000 milliLiter(s) (50 mL/Hr) IV Continuous <Continuous>  dextrose 50% Injectable 12.5 Gram(s) IV Push once  dextrose 50% Injectable 25 Gram(s) IV Push once  dextrose 50% Injectable 25 Gram(s) IV Push once  heparin  Injectable 5000 Unit(s) SubCutaneous every 8 hours  influenza   Vaccine 0.5 milliLiter(s) IntraMuscular once  insulin lispro (HumaLOG) corrective regimen sliding scale   SubCutaneous three times a day before meals  labetalol 300 milliGRAM(s) Oral three times a day  lactobacillus acidophilus 1 Tablet(s) Oral daily  multivitamin 1 Tablet(s) Oral daily  NIFEdipine XL 60 milliGRAM(s) Oral daily  silver sulfADIAZINE 1% Cream 1 Application(s) Topical daily  sodium bicarbonate 650 milliGRAM(s) Oral every 12 hours    MEDICATIONS  (PRN):  acetaminophen   Tablet 650 milliGRAM(s) Oral every 6 hours PRN For Temp greater than 38 C (100.4 F)  acetaminophen   Tablet. 650 milliGRAM(s) Oral every 6 hours PRN Moderate Pain (4 - 6)  dextrose Gel 1 Dose(s) Oral once PRN Blood Glucose LESS THAN 70 milliGRAM(s)/deciliter  glucagon  Injectable 1 milliGRAM(s) IntraMuscular once PRN Glucose LESS THAN 70 milligrams/deciliter        CAPILLARY BLOOD GLUCOSE      POCT Blood Glucose.: 139 mg/dL (12 Mar 2018 21:52)  POCT Blood Glucose.: 180 mg/dL (12 Mar 2018 17:03)  POCT Blood Glucose.: 87 mg/dL (12 Mar 2018 12:16)    I&O's Summary    12 Mar 2018 07:01  -  13 Mar 2018 07:00  --------------------------------------------------------  IN: 240 mL / OUT: 1025 mL / NET: -785 mL        PHYSICAL EXAM:  GENERAL: NAD, well-developed  HEAD:  Atraumatic, Normocephalic  EYES: EOMI, PERRLA, conjunctiva and sclera clear  NECK: Supple, No JVD  CHEST/LUNG: Clear to auscultation bilaterally; No wheeze  HEART: Regular rate and rhythm; No murmurs, rubs, or gallops  ABDOMEN: Soft, Nontender, Nondistended; Bowel sounds present  EXTREMITIES:  2+ Peripheral Pulses, s/p R BKA; unable to palpate DP on left.   PSYCH: AAOx3  NEUROLOGY: non-focal  SKIN: No rashes or lesions    LABS:    WBC Trend: 4.66<--, 4.56<--, 4.37<--  Hb Trend: 7.8<--, 7.5<--, 7.5<--, 7.3<--  Plt Trend: 261<--, 242<--, 245<--, 277<--  03-12    142  |  101  |  67<H>  ----------------------------<  85  5.0   |  24  |  4.48<H>    Ca    8.4      12 Mar 2018 05:13      Creatinine Trend: 4.48<--, 4.48<--, 4.45<--, 4.15<--, 4.63<--, 4.66<--  ( 12 Mar 2018 05:13 )   PT: 13.5 SEC;   INR: 1.21 ;       PTT:38.1 SEC      RADIOLOGY & ADDITIONAL TESTS:  No new imaging in past 24 hrs.     Consultant(s) Notes Reviewed:  Vascular, Podiatry     Care Discussed with Consultants/Other Providers: Vascular Patient is a 63y old  Male who presents with a chief complaint of 63M PMH DM, HTN, CKD p/w L foot pain x 3 days. (19 Feb 2018 10:39)      SUBJECTIVE / OVERNIGHT EVENTS: No acute overnight events. Reports no pain in LLE. BG well controlled. Femoral-popliteal bypass rescheduled for 03/15. Urine output 1 L in last 24 hrs.     MEDICATIONS  (STANDING):  aspirin enteric coated 81 milliGRAM(s) Oral daily  atorvastatin 80 milliGRAM(s) Oral at bedtime  cyanocobalamin 1000 MICROGram(s) Oral daily  dextrose 5%. 1000 milliLiter(s) (50 mL/Hr) IV Continuous <Continuous>  dextrose 50% Injectable 12.5 Gram(s) IV Push once  dextrose 50% Injectable 25 Gram(s) IV Push once  dextrose 50% Injectable 25 Gram(s) IV Push once  heparin  Injectable 5000 Unit(s) SubCutaneous every 8 hours  influenza   Vaccine 0.5 milliLiter(s) IntraMuscular once  insulin lispro (HumaLOG) corrective regimen sliding scale   SubCutaneous three times a day before meals  labetalol 300 milliGRAM(s) Oral three times a day  lactobacillus acidophilus 1 Tablet(s) Oral daily  multivitamin 1 Tablet(s) Oral daily  NIFEdipine XL 60 milliGRAM(s) Oral daily  silver sulfADIAZINE 1% Cream 1 Application(s) Topical daily  sodium bicarbonate 650 milliGRAM(s) Oral every 12 hours    MEDICATIONS  (PRN):  acetaminophen   Tablet 650 milliGRAM(s) Oral every 6 hours PRN For Temp greater than 38 C (100.4 F)  acetaminophen   Tablet. 650 milliGRAM(s) Oral every 6 hours PRN Moderate Pain (4 - 6)  dextrose Gel 1 Dose(s) Oral once PRN Blood Glucose LESS THAN 70 milliGRAM(s)/deciliter  glucagon  Injectable 1 milliGRAM(s) IntraMuscular once PRN Glucose LESS THAN 70 milligrams/deciliter        CAPILLARY BLOOD GLUCOSE      POCT Blood Glucose.: 139 mg/dL (12 Mar 2018 21:52)  POCT Blood Glucose.: 180 mg/dL (12 Mar 2018 17:03)  POCT Blood Glucose.: 87 mg/dL (12 Mar 2018 12:16)    I&O's Summary    12 Mar 2018 07:01  -  13 Mar 2018 07:00  --------------------------------------------------------  IN: 240 mL / OUT: 1025 mL / NET: -785 mL        PHYSICAL EXAM:  GENERAL: NAD, well-developed  HEAD:  Atraumatic, Normocephalic  EYES: EOMI, PERRLA, conjunctiva and sclera clear  NECK: Supple, No JVD  CHEST/LUNG: Clear to auscultation bilaterally; No wheeze  HEART: Regular rate and rhythm; No murmurs, rubs, or gallops  ABDOMEN: Soft, Nontender, Nondistended; Bowel sounds present  EXTREMITIES:  2+ Peripheral Pulses, s/p R BKA; unable to palpate DP on left.   PSYCH: AAOx3  NEUROLOGY: non-focal  SKIN: No rashes or lesions    LABS:    WBC Trend: 4.66<--, 4.56<--, 4.37<--  Hb Trend: 7.8<--, 7.5<--, 7.5<--, 7.3<--  Plt Trend: 261<--, 242<--, 245<--, 277<--  03-12    142  |  101  |  67<H>  ----------------------------<  85  5.0   |  24  |  4.48<H>    Ca    8.4      12 Mar 2018 05:13      Creatinine Trend: 4.48<--, 4.48<--, 4.45<--, 4.15<--, 4.63<--, 4.66<--  ( 12 Mar 2018 05:13 )   PT: 13.5 SEC;   INR: 1.21 ;       PTT:38.1 SEC      RADIOLOGY & ADDITIONAL TESTS:  No new imaging in past 24 hrs.     Consultant(s) Notes Reviewed:  Vascular, Podiatry     Care Discussed with Consultants/Other Providers: Vascular

## 2018-03-13 NOTE — PROGRESS NOTE ADULT - ASSESSMENT
63M PMHx DM, s/p R BKA, now presenting with Diabetic foot soft tissue infection, w/ angiography showing arterial stenosis of LLE, plan for LLE bypass.    - Plan for OR on Thursday 3/15/18  - NPO Mdn Wednesday  - pre-op labs (CBC, BMP/Mg/Phos, INR/PT/PTT, T&S)  - Wound care as per podiatry

## 2018-03-13 NOTE — PROGRESS NOTE ADULT - PROBLEM SELECTOR PLAN 2
Acidosis in the setting of renal failure and infection. Resolved. Serum CO2 26 (WNL). Continue with sodium bicarbonate therapy and Monitor serum CO2

## 2018-03-13 NOTE — PROGRESS NOTE ADULT - SUBJECTIVE AND OBJECTIVE BOX
[facility] DIVISION OF KIDNEY DISEASES AND HYPERTENSION -- FOLLOW UP NOTE  --------------------------------------------------------------------------------    HPI: 63-year-old male with PMH of HTN, DM, right BKA, and CKD admitted for left foot infection. As per review of labs on [place]/[employer], Scr was 1.51 in 3/2017. As per PMD's office, Scr checked in 7/2017 was 2.30. Labs on admission (2/12) showed elevated Scr of 3.8 which peaked to 6.3 on 2/23 and is stable at 4.44 today. Pt. underwent vascular study/left leg angiogram on 3/8. Patient seen and examined today. Pt feels well and has no complaints. Pt. denies SOB, CP or N/V.     PAST HISTORY  --------------------------------------------------------------------------------  No significant changes to PMH, PSH, FHx, SHx, unless otherwise noted    ALLERGIES & MEDICATIONS  --------------------------------------------------------------------------------  Allergies    No Known Allergies    Intolerances      Standing Inpatient Medications  aspirin enteric coated 81 milliGRAM(s) Oral daily  atorvastatin 80 milliGRAM(s) Oral at bedtime  cyanocobalamin 1000 MICROGram(s) Oral daily  dextrose 5%. 1000 milliLiter(s) IV Continuous <Continuous>  dextrose 50% Injectable 12.5 Gram(s) IV Push once  dextrose 50% Injectable 25 Gram(s) IV Push once  dextrose 50% Injectable 25 Gram(s) IV Push once  heparin  Injectable 5000 Unit(s) SubCutaneous every 8 hours  influenza   Vaccine 0.5 milliLiter(s) IntraMuscular once  insulin lispro (HumaLOG) corrective regimen sliding scale   SubCutaneous three times a day before meals  labetalol 300 milliGRAM(s) Oral three times a day  lactobacillus acidophilus 1 Tablet(s) Oral daily  multivitamin 1 Tablet(s) Oral daily  NIFEdipine XL 60 milliGRAM(s) Oral daily  silver sulfADIAZINE 1% Cream 1 Application(s) Topical daily  sodium bicarbonate 650 milliGRAM(s) Oral every 12 hours    PRN Inpatient Medications  acetaminophen   Tablet 650 milliGRAM(s) Oral every 6 hours PRN  acetaminophen   Tablet. 650 milliGRAM(s) Oral every 6 hours PRN  dextrose Gel 1 Dose(s) Oral once PRN  glucagon  Injectable 1 milliGRAM(s) IntraMuscular once PRN      REVIEW OF SYSTEMS  --------------------------------------------------------------------------------  CVS: no chest pain  RESP: no SOB  ABD: no abdominal pain  : no dysuria  MSK: +Left leg edema/foot infection    All other systems were reviewed and are negative, except as noted.    VITALS/PHYSICAL EXAM  --------------------------------------------------------------------------------  T(C): 37.5 (03-13-18 @ 04:52), Max: 37.5 (03-13-18 @ 04:52)  HR: 71 (03-13-18 @ 04:52) (58 - 71)  BP: 156/51 (03-13-18 @ 04:52) (145/62 - 156/51)  RR: 18 (03-13-18 @ 04:52) (18 - 18)  SpO2: 95% (03-13-18 @ 04:52) (93% - 95%)  Wt(kg): --    03-12-18 @ 07:01  -  03-13-18 @ 07:00  --------------------------------------------------------  IN: 240 mL / OUT: 1025 mL / NET: -785 mL    Physical Exam:  	Gen: Resting in bed   	HEENT: No JVD  	Pulm: CTA B/L  	CV: S1S2+  	Abd: soft  	LE: Right BKA, LLE edema present, left foot dressing seen   	Neuro: Awake and alert   	Psych: Normal affect and mood  	Skin: Warm    LABS/STUDIES  --------------------------------------------------------------------------------              7.5    4.35  >-----------<  237      [03-13-18 @ 06:30]              23.3     144  |  104  |  62  ----------------------------<  74      [03-13-18 @ 06:30]  4.4   |  26  |  4.44        Ca     8.2     [03-13-18 @ 06:30]      Mg     1.9     [03-13-18 @ 06:30]      Phos  4.6     [03-13-18 @ 06:30]      PT/INR: PT 13.6 , INR 1.18       [03-13-18 @ 06:30]  PTT: 42.7       [03-13-18 @ 06:30]    Creatinine Trend:  SCr 4.44 [03-13 @ 06:30]  SCr 4.48 [03-12 @ 05:13]  SCr 4.48 [03-11 @ 06:45]  SCr 4.45 [03-10 @ 06:40]  SCr 4.15 [03-09 @ 06:30]    Urinalysis - [02-25-18 @ 04:50]      Color PLYEL / Appearance CLEAR / SG 1.015 / pH 5.5      Gluc NEGATIVE / Ketone NEGATIVE  / Bili NEGATIVE / Urobili NORMAL       Blood TRACE / Protein 100 / Leuk Est TRACE / Nitrite NEGATIVE      RBC 0-2 / WBC 2-5 / Hyaline  / Gran  / Sq Epi OCC / Non Sq Epi  / Bacteria     Iron 21, TIBC 163, %sat --      [02-20-18 @ 05:00]  Ferritin 540.7      [02-20-18 @ 05:00]  HbA1c 5.9      [02-13-18 @ 07:11]  TSH 3.22      [03-08-18 @ 06:00]      Free Light Chains: kappa 20.50, lambda 11.20, ratio = 1.83 H      [02-25 @ 07:43]

## 2018-03-13 NOTE — PROGRESS NOTE ADULT - PROBLEM SELECTOR PLAN 1
Creatinine remains 4.48. ATN on CKD stage III in the setting of sepsis. 4.4 may be his new baseline creatinine. S/p angiogram.  Continuing to monitor urine outputs, trend sCr daily Creatinine remains 4.4. ATN on CKD stage III in the setting of sepsis. 4.4 may be his new baseline creatinine. S/p angiogram.  Continuing to monitor urine outputs, trend sCr daily

## 2018-03-13 NOTE — PROGRESS NOTE ADULT - PROBLEM SELECTOR PLAN 1
Pt. with JANY on CKD in the setting of infection and diarrhea. CKD in the setting of long-standing DM. Scr was 1.5 in 3/2017, increased to 2.30 in 7/2017. Scr on admission (2/12) was elevated at 3.81, increased to 6.3 on 2/23 and has now improved. Pt. with likely ATN. Pt. also underwent vascular study/left leg angiogram on 3/8. Scr is stable at 4.44 today. Pt. at increased risk for radiocontrast nephropathy. Monitor BMP and urine output. Avoid any potential nephrotoxins Pt. with JANY on CKD in the setting of infection and diarrhea. CKD in the setting of long-standing DM. Scr was 1.5 in 3/2017, increased to 2.30 in 7/2017. Scr on admission (2/12) was elevated at 3.81, increased to 6.3 on 2/23 and has now improved, remaining stable ~4.4. Pt. with likely ATN. Pt. also underwent vascular study/left leg angiogram on 3/8. Scr is stable at 4.44 today. Pt. at increased risk for radiocontrast nephropathy. Monitor BMP and urine output. Avoid any potential nephrotoxins.

## 2018-03-13 NOTE — PROGRESS NOTE ADULT - SUBJECTIVE AND OBJECTIVE BOX
GENERAL SURGERY DAILY PROGRESS NOTE:       Subjective:  NAOE. Pain ok.         Objective:    PE:  Gen: NAD  Ext: LLE w/ kerlex dressing in place; s/p R BKA, healed left toe wound stable      Vital Signs Last 24 Hrs  T(C): 37.5 (13 Mar 2018 04:52), Max: 37.5 (13 Mar 2018 04:52)  T(F): 99.5 (13 Mar 2018 04:52), Max: 99.5 (13 Mar 2018 04:52)  HR: 71 (13 Mar 2018 04:52) (58 - 71)  BP: 156/51 (13 Mar 2018 04:52) (145/62 - 156/51)  BP(mean): --  RR: 18 (13 Mar 2018 04:52) (18 - 18)  SpO2: 95% (13 Mar 2018 04:52) (93% - 95%)    I&O's Detail    12 Mar 2018 07:01  -  13 Mar 2018 07:00  --------------------------------------------------------  IN:    Oral Fluid: 240 mL  Total IN: 240 mL    OUT:    Voided: 1025 mL  Total OUT: 1025 mL    Total NET: -785 mL          Daily     Daily Weight in k (13 Mar 2018 04:52)    MEDICATIONS  (STANDING):  aspirin enteric coated 81 milliGRAM(s) Oral daily  atorvastatin 80 milliGRAM(s) Oral at bedtime  cyanocobalamin 1000 MICROGram(s) Oral daily  dextrose 5%. 1000 milliLiter(s) (50 mL/Hr) IV Continuous <Continuous>  dextrose 50% Injectable 12.5 Gram(s) IV Push once  dextrose 50% Injectable 25 Gram(s) IV Push once  dextrose 50% Injectable 25 Gram(s) IV Push once  heparin  Injectable 5000 Unit(s) SubCutaneous every 8 hours  influenza   Vaccine 0.5 milliLiter(s) IntraMuscular once  insulin lispro (HumaLOG) corrective regimen sliding scale   SubCutaneous three times a day before meals  labetalol 300 milliGRAM(s) Oral three times a day  lactobacillus acidophilus 1 Tablet(s) Oral daily  multivitamin 1 Tablet(s) Oral daily  NIFEdipine XL 60 milliGRAM(s) Oral daily  silver sulfADIAZINE 1% Cream 1 Application(s) Topical daily  sodium bicarbonate 650 milliGRAM(s) Oral every 12 hours    MEDICATIONS  (PRN):  acetaminophen   Tablet 650 milliGRAM(s) Oral every 6 hours PRN For Temp greater than 38 C (100.4 F)  acetaminophen   Tablet. 650 milliGRAM(s) Oral every 6 hours PRN Moderate Pain (4 - 6)  dextrose Gel 1 Dose(s) Oral once PRN Blood Glucose LESS THAN 70 milliGRAM(s)/deciliter  glucagon  Injectable 1 milliGRAM(s) IntraMuscular once PRN Glucose LESS THAN 70 milligrams/deciliter      LABS:                        7.5    4.35  )-----------( 237      ( 13 Mar 2018 06:30 )             23.3     03-13    144  |  104  |  62<H>  ----------------------------<  74  4.4   |  26  |  4.44<H>    Ca    8.2<L>      13 Mar 2018 06:30  Phos  4.6     -13  Mg     1.9     -13      PT/INR - ( 13 Mar 2018 06:30 )   PT: 13.6 SEC;   INR: 1.18          PTT - ( 13 Mar 2018 06:30 )  PTT:42.7 SEC GENERAL SURGERY DAILY PROGRESS NOTE:       Pt w/o new c/o    PE:  Gen: NAD A and O x3  Ext: LLE w/ kerlex dressing in place; s/p R BKA, healed left toe wound stable      Vital Signs Last 24 Hrs  T(C): 37.5 (13 Mar 2018 04:52), Max: 37.5 (13 Mar 2018 04:52)  T(F): 99.5 (13 Mar 2018 04:52), Max: 99.5 (13 Mar 2018 04:52)  HR: 71 (13 Mar 2018 04:52) (58 - 71)  BP: 156/51 (13 Mar 2018 04:52) (145/62 - 156/51)  BP(mean): --  RR: 18 (13 Mar 2018 04:52) (18 - 18)  SpO2: 95% (13 Mar 2018 04:52) (93% - 95%)    I&O's Detail    12 Mar 2018 07:01  -  13 Mar 2018 07:00  --------------------------------------------------------  IN:    Oral Fluid: 240 mL  Total IN: 240 mL    OUT:    Voided: 1025 mL  Total OUT: 1025 mL    Total NET: -785 mL        Daily Weight in k (13 Mar 2018 04:52)    MEDICATIONS  (STANDING):  aspirin enteric coated 81 milliGRAM(s) Oral daily  atorvastatin 80 milliGRAM(s) Oral at bedtime  cyanocobalamin 1000 MICROGram(s) Oral daily  dextrose 5%. 1000 milliLiter(s) (50 mL/Hr) IV Continuous <Continuous>  dextrose 50% Injectable 12.5 Gram(s) IV Push once  dextrose 50% Injectable 25 Gram(s) IV Push once  dextrose 50% Injectable 25 Gram(s) IV Push once  heparin  Injectable 5000 Unit(s) SubCutaneous every 8 hours  influenza   Vaccine 0.5 milliLiter(s) IntraMuscular once  insulin lispro (HumaLOG) corrective regimen sliding scale   SubCutaneous three times a day before meals  labetalol 300 milliGRAM(s) Oral three times a day  lactobacillus acidophilus 1 Tablet(s) Oral daily  multivitamin 1 Tablet(s) Oral daily  NIFEdipine XL 60 milliGRAM(s) Oral daily  silver sulfADIAZINE 1% Cream 1 Application(s) Topical daily  sodium bicarbonate 650 milliGRAM(s) Oral every 12 hours    MEDICATIONS  (PRN):  acetaminophen   Tablet 650 milliGRAM(s) Oral every 6 hours PRN For Temp greater than 38 C (100.4 F)  acetaminophen   Tablet. 650 milliGRAM(s) Oral every 6 hours PRN Moderate Pain (4 - 6)  dextrose Gel 1 Dose(s) Oral once PRN Blood Glucose LESS THAN 70 milliGRAM(s)/deciliter  glucagon  Injectable 1 milliGRAM(s) IntraMuscular once PRN Glucose LESS THAN 70 milligrams/deciliter      LABS:                        7.5    4.35  )-----------( 237      ( 13 Mar 2018 06:30 )             23.3     03-13    144  |  104  |  62<H>  ----------------------------<  74  4.4   |  26  |  4.44<H>    Ca    8.2<L>      13 Mar 2018 06:30  Phos  4.6     -  Mg     1.9     -13      PT/INR - ( 13 Mar 2018 06:30 )   PT: 13.6 SEC;   INR: 1.18          PTT - ( 13 Mar 2018 06:30 )  PTT:42.7 SEC

## 2018-03-14 DIAGNOSIS — Z01.818 ENCOUNTER FOR OTHER PREPROCEDURAL EXAMINATION: ICD-10-CM

## 2018-03-14 LAB
BLD GP AB SCN SERPL QL: NEGATIVE — SIGNIFICANT CHANGE UP
BUN SERPL-MCNC: 63 MG/DL — HIGH (ref 7–23)
CALCIUM SERPL-MCNC: 8.4 MG/DL — SIGNIFICANT CHANGE UP (ref 8.4–10.5)
CHLORIDE SERPL-SCNC: 104 MMOL/L — SIGNIFICANT CHANGE UP (ref 98–107)
CO2 SERPL-SCNC: 23 MMOL/L — SIGNIFICANT CHANGE UP (ref 22–31)
CREAT SERPL-MCNC: 4.4 MG/DL — HIGH (ref 0.5–1.3)
GLUCOSE BLDC GLUCOMTR-MCNC: 108 MG/DL — HIGH (ref 70–99)
GLUCOSE BLDC GLUCOMTR-MCNC: 124 MG/DL — HIGH (ref 70–99)
GLUCOSE BLDC GLUCOMTR-MCNC: 135 MG/DL — HIGH (ref 70–99)
GLUCOSE BLDC GLUCOMTR-MCNC: 73 MG/DL — SIGNIFICANT CHANGE UP (ref 70–99)
GLUCOSE SERPL-MCNC: 76 MG/DL — SIGNIFICANT CHANGE UP (ref 70–99)
HCT VFR BLD CALC: 23.8 % — LOW (ref 39–50)
HCT VFR BLD CALC: 28.8 % — LOW (ref 39–50)
HGB BLD-MCNC: 7.4 G/DL — LOW (ref 13–17)
HGB BLD-MCNC: 9.1 G/DL — LOW (ref 13–17)
MAGNESIUM SERPL-MCNC: 1.8 MG/DL — SIGNIFICANT CHANGE UP (ref 1.6–2.6)
MCHC RBC-ENTMCNC: 26.8 PG — LOW (ref 27–34)
MCHC RBC-ENTMCNC: 27.4 PG — SIGNIFICANT CHANGE UP (ref 27–34)
MCHC RBC-ENTMCNC: 31.1 % — LOW (ref 32–36)
MCHC RBC-ENTMCNC: 31.6 % — LOW (ref 32–36)
MCV RBC AUTO: 86.2 FL — SIGNIFICANT CHANGE UP (ref 80–100)
MCV RBC AUTO: 86.7 FL — SIGNIFICANT CHANGE UP (ref 80–100)
NRBC # FLD: 0 — SIGNIFICANT CHANGE UP
NRBC # FLD: 0 — SIGNIFICANT CHANGE UP
PHOSPHATE SERPL-MCNC: 5 MG/DL — HIGH (ref 2.5–4.5)
PLATELET # BLD AUTO: 239 K/UL — SIGNIFICANT CHANGE UP (ref 150–400)
PLATELET # BLD AUTO: 260 K/UL — SIGNIFICANT CHANGE UP (ref 150–400)
PMV BLD: 11.2 FL — SIGNIFICANT CHANGE UP (ref 7–13)
PMV BLD: 11.6 FL — SIGNIFICANT CHANGE UP (ref 7–13)
POTASSIUM SERPL-MCNC: 4.4 MMOL/L — SIGNIFICANT CHANGE UP (ref 3.5–5.3)
POTASSIUM SERPL-SCNC: 4.4 MMOL/L — SIGNIFICANT CHANGE UP (ref 3.5–5.3)
RBC # BLD: 2.76 M/UL — LOW (ref 4.2–5.8)
RBC # BLD: 3.32 M/UL — LOW (ref 4.2–5.8)
RBC # FLD: 14.5 % — SIGNIFICANT CHANGE UP (ref 10.3–14.5)
RBC # FLD: 14.6 % — HIGH (ref 10.3–14.5)
RH IG SCN BLD-IMP: POSITIVE — SIGNIFICANT CHANGE UP
SODIUM SERPL-SCNC: 142 MMOL/L — SIGNIFICANT CHANGE UP (ref 135–145)
WBC # BLD: 4.05 K/UL — SIGNIFICANT CHANGE UP (ref 3.8–10.5)
WBC # BLD: 4.14 K/UL — SIGNIFICANT CHANGE UP (ref 3.8–10.5)
WBC # FLD AUTO: 4.05 K/UL — SIGNIFICANT CHANGE UP (ref 3.8–10.5)
WBC # FLD AUTO: 4.14 K/UL — SIGNIFICANT CHANGE UP (ref 3.8–10.5)

## 2018-03-14 PROCEDURE — 99232 SBSQ HOSP IP/OBS MODERATE 35: CPT

## 2018-03-14 PROCEDURE — 99233 SBSQ HOSP IP/OBS HIGH 50: CPT

## 2018-03-14 PROCEDURE — 99233 SBSQ HOSP IP/OBS HIGH 50: CPT | Mod: GC

## 2018-03-14 RX ORDER — FUROSEMIDE 40 MG
20 TABLET ORAL ONCE
Refills: 0 | Status: DISCONTINUED | OUTPATIENT
Start: 2018-03-14 | End: 2018-03-16

## 2018-03-14 RX ORDER — FUROSEMIDE 40 MG
20 TABLET ORAL ONCE
Refills: 0 | Status: COMPLETED | OUTPATIENT
Start: 2018-03-14 | End: 2018-03-14

## 2018-03-14 RX ORDER — FUROSEMIDE 40 MG
60 TABLET ORAL ONCE
Refills: 0 | Status: COMPLETED | OUTPATIENT
Start: 2018-03-14 | End: 2018-03-14

## 2018-03-14 RX ADMIN — Medication 60 MILLIGRAM(S): at 05:51

## 2018-03-14 RX ADMIN — Medication 20 MILLIGRAM(S): at 21:15

## 2018-03-14 RX ADMIN — Medication 300 MILLIGRAM(S): at 05:51

## 2018-03-14 RX ADMIN — Medication 81 MILLIGRAM(S): at 11:25

## 2018-03-14 RX ADMIN — HEPARIN SODIUM 5000 UNIT(S): 5000 INJECTION INTRAVENOUS; SUBCUTANEOUS at 14:59

## 2018-03-14 RX ADMIN — Medication 1 TABLET(S): at 11:25

## 2018-03-14 RX ADMIN — Medication 60 MILLIGRAM(S): at 14:58

## 2018-03-14 RX ADMIN — HEPARIN SODIUM 5000 UNIT(S): 5000 INJECTION INTRAVENOUS; SUBCUTANEOUS at 21:15

## 2018-03-14 RX ADMIN — Medication 650 MILLIGRAM(S): at 17:21

## 2018-03-14 RX ADMIN — HEPARIN SODIUM 5000 UNIT(S): 5000 INJECTION INTRAVENOUS; SUBCUTANEOUS at 05:51

## 2018-03-14 RX ADMIN — Medication 1 APPLICATION(S): at 11:50

## 2018-03-14 RX ADMIN — Medication 650 MILLIGRAM(S): at 05:51

## 2018-03-14 RX ADMIN — Medication 300 MILLIGRAM(S): at 14:58

## 2018-03-14 RX ADMIN — PREGABALIN 1000 MICROGRAM(S): 225 CAPSULE ORAL at 11:25

## 2018-03-14 RX ADMIN — ATORVASTATIN CALCIUM 80 MILLIGRAM(S): 80 TABLET, FILM COATED ORAL at 21:15

## 2018-03-14 NOTE — PROGRESS NOTE ADULT - PROBLEM SELECTOR PLAN 1
Pt. with JANY on CKD in the setting of infection and diarrhea. CKD in the setting of long-standing DM. Scr was 1.5 in 3/2017, increased to 2.30 in 7/2017. Scr on admission (2/12) was elevated at 3.81, increased to 6.3 on 2/23 and has now improved, remaining stable ~4.4. Pt. with likely ATN. Pt. also underwent vascular study/left leg angiogram on 3/8. Scr is stable at 4.44 today. Pt. at increased risk for radiocontrast nephropathy. If undergoing any further procedures that involve IV contrast, would recommend to give IV NS @75cc/hr 6 hrs pre, during and 6 hrs post procedure. Monitor BMP and urine output. Avoid any potential nephrotoxins.

## 2018-03-14 NOTE — PROGRESS NOTE ADULT - PROBLEM SELECTOR PLAN 5
S/p angiogram. With multi vessel disease s/p LE dopplers, for vein mapping for grafts.  -For Femoral-popliteal bypass as above   C/w ASA and statin

## 2018-03-14 NOTE — PROGRESS NOTE ADULT - PROBLEM SELECTOR PLAN 8
Pt on oral medications at home.   c/w ISS  Well controlled. Pt on oral medications at home.   c/w ISS  Well controlled; requiring 1 corrective unit in last 24 hrs.

## 2018-03-14 NOTE — PROGRESS NOTE ADULT - PROBLEM SELECTOR PLAN 6
Hgb has been stable in the 7-8 range.   Folate, B12, TSH all WNL.  Likely 2/2 in setting of decreased EPO production d/t worsening kidney function. Hemolysis less likely. T bili is WNL. Acute blood loss also less likely as no source.

## 2018-03-14 NOTE — PROGRESS NOTE ADULT - ASSESSMENT
63M PMHx DM, s/p R BKA, now presenting with Diabetic foot soft tissue infection, w/ angiography showing arterial stenosis of LLE, plan for LLE bypass.    - Plan for OR on Thursday 3/15/18  - NPO Mdn Wednesday  - IVF when NPO  - pre-op labs (CBC, BMP/Mg/Phos, INR/PT/PTT, T&S)  - Wound care as per podiatry    [id number] 63M PMHx DM, s/p R BKA, now presenting with Diabetic foot soft tissue infection, w/ angiography showing arterial stenosis of LLE, plan for LLE bypass.    - Plan for OR on Thursday 3/15/18  - NPO Mdn Wednesday  - IVF when NPO  - pre-op labs (CBC, BMP/Mg/Phos, INR/PT/PTT, T&S)  - Wound care as per podiatry  will tx pt 1 u prbc and f/u HH if Hct less then 30 will tx a 2nd u prbc

## 2018-03-14 NOTE — PROGRESS NOTE ADULT - SUBJECTIVE AND OBJECTIVE BOX
PRE OPERATIVE NOTE    Pre-op Diagnosis: PAD  Procedure: L fem-pop bypass  Surgeon: Alisia                          7.4    4.14  )-----------( 260      ( 14 Mar 2018 06:15 )             23.8     03-14    142  |  104  |  63<H>  ----------------------------<  76  4.4   |  23  |  4.40<H>    Ca    8.4      14 Mar 2018 06:15  Phos  5.0     03-14  Mg     1.8     03-14        PT/INR - ( 13 Mar 2018 06:30 )   PT: 13.6 SEC;   INR: 1.18          PTT - ( 13 Mar 2018 06:30 )  PTT:42.7 SEC    CAPILLARY BLOOD GLUCOSE      POCT Blood Glucose.: 135 mg/dL (14 Mar 2018 12:09)      Type & Screen: ordered  CXR:   < from: Xray Chest 1 View- PORTABLE-Routine (03.13.18 @ 07:51) >  IMPRESSION:   Gestationalpulmonary edema again noted appearing slightly worse on the   right than on the prior image and unchanged on the left.    Small to moderate right and small left pleural effusions with likely   associated passive atelectasis are not significant change. Underlying   atelectasis of other cause and/or pneumonia in the area of the effusions   is not excluded.      < end of copied text >    EKG:   cleared per cardiology  Echocardiogram:   < from: Transthoracic Echocardiogram (03.02.18 @ 11:03) >  CONCLUSIONS:  1. Mitral annular calcification, otherwise normal mitral  valve. Mild mitral regurgitation.  2. Aortic valve leaflet morphology not well visualized.  Peak transaortic valve gradient equals 19 mm Hg, mean  transaortic valve gradient equals 11 mm Hg, consistent with  probable mild aortic stenosis. Minimal aortic  regurgitation.  3. Normal left ventricular internal dimensions and wall  thicknesses.  4. Endocardium not well visualized; grossly normal left  ventricular systolic function.  5. The right ventricle is not well visualized; grossly  normal right ventricular systolic function.  6. Estimated right ventricular systolic pressure equals 57  mm Hg, assuming right atrial pressure equals 10 mm Hg,  consistent with moderate pulmonary hypertension.  7. Bilateral pleural effusions.  ------------------------------------------------------------------------  Confirmed on  3/2/2018 - 12:20:40 by Reynold Jackson M.D.  ------------------------------------------------------------------------    < end of copied text >      A/P: 63y Male planned for above procedure  NPO past midnight, except medications  IVF  Pain/nausea control  AM labs  Consent in chart  furosemide   Injectable  labetalol  NIFEdipine XL  cleared per cardiology  pt getting 1 unit now, will f/u post-CBC and transfuse as needed  2 units on hold for OR tomorrow

## 2018-03-14 NOTE — PROGRESS NOTE ADULT - PROBLEM SELECTOR PLAN 9
-DVT ppx: HSQ  -Diabetic Diet    Dalton Nunez D.O.  PGY-1 EM/IM  Pager #[phone] -DVT ppx: HSQ  -Diabetic Diet

## 2018-03-14 NOTE — PROGRESS NOTE ADULT - PROBLEM SELECTOR PLAN 2
Likely due to pulmonary edema given acuity and CXR confirming such; possibly due to acute on chronic HFpEF in the setting of JANY. Now improved s/p lasix yesterday.  - Monitor I's and O's & oxygen saturation  - no s/s of fluid overload this AM; will monitor post op for any pulmonary edema Likely due to pulmonary edema given acuity and CXR confirming such; possibly due to acute on chronic HFpEF in the setting of JANY.   - Monitor I's and O's & oxygen saturation  - Lasix IVP today as rales on exam & fluid congestion on CXR in order to optimize respiratory status pre-op. Likely due to pulmonary edema given acuity and CXR confirming such; possibly due to acute on chronic HFpEF in the setting of JANY.   - Monitor I's and O's & oxygen saturation  - will give dose of lasix today as rales on exam & fluid congestion on CXR in order to optimize respiratory status pre-op.

## 2018-03-14 NOTE — PROGRESS NOTE ADULT - SUBJECTIVE AND OBJECTIVE BOX
Patient is a 63y old  Male who presents with a chief complaint of 63M PMH DM, HTN, CKD p/w L foot pain x 3 days. (19 Feb 2018 10:39)      SUBJECTIVE / OVERNIGHT EVENTS:    MEDICATIONS  (STANDING):  aspirin enteric coated 81 milliGRAM(s) Oral daily  atorvastatin 80 milliGRAM(s) Oral at bedtime  cyanocobalamin 1000 MICROGram(s) Oral daily  dextrose 5%. 1000 milliLiter(s) (50 mL/Hr) IV Continuous <Continuous>  dextrose 50% Injectable 12.5 Gram(s) IV Push once  dextrose 50% Injectable 25 Gram(s) IV Push once  dextrose 50% Injectable 25 Gram(s) IV Push once  heparin  Injectable 5000 Unit(s) SubCutaneous every 8 hours  influenza   Vaccine 0.5 milliLiter(s) IntraMuscular once  insulin lispro (HumaLOG) corrective regimen sliding scale   SubCutaneous three times a day before meals  labetalol 300 milliGRAM(s) Oral three times a day  lactobacillus acidophilus 1 Tablet(s) Oral daily  multivitamin 1 Tablet(s) Oral daily  NIFEdipine XL 60 milliGRAM(s) Oral daily  silver sulfADIAZINE 1% Cream 1 Application(s) Topical daily  sodium bicarbonate 650 milliGRAM(s) Oral every 12 hours    MEDICATIONS  (PRN):  acetaminophen   Tablet 650 milliGRAM(s) Oral every 6 hours PRN For Temp greater than 38 C (100.4 F)  acetaminophen   Tablet. 650 milliGRAM(s) Oral every 6 hours PRN Moderate Pain (4 - 6)  dextrose Gel 1 Dose(s) Oral once PRN Blood Glucose LESS THAN 70 milliGRAM(s)/deciliter  glucagon  Injectable 1 milliGRAM(s) IntraMuscular once PRN Glucose LESS THAN 70 milligrams/deciliter        CAPILLARY BLOOD GLUCOSE      POCT Blood Glucose.: 112 mg/dL (13 Mar 2018 21:41)  POCT Blood Glucose.: 94 mg/dL (13 Mar 2018 17:10)  POCT Blood Glucose.: 158 mg/dL (13 Mar 2018 12:07)  POCT Blood Glucose.: 84 mg/dL (13 Mar 2018 08:33)    I&O's Summary    12 Mar 2018 07:01  -  13 Mar 2018 07:00  --------------------------------------------------------  IN: 240 mL / OUT: 1025 mL / NET: -785 mL    13 Mar 2018 07:01  -  14 Mar 2018 06:55  --------------------------------------------------------  IN: 180 mL / OUT: 800 mL / NET: -620 mL        PHYSICAL EXAM:  GENERAL: NAD, well-developed  HEAD:  Atraumatic, Normocephalic  EYES: EOMI, PERRLA, conjunctiva and sclera clear  NECK: Supple, No JVD  CHEST/LUNG: Clear to auscultation bilaterally; No wheeze  HEART: Regular rate and rhythm; No murmurs, rubs, or gallops  ABDOMEN: Soft, Nontender, Nondistended; Bowel sounds present  EXTREMITIES:  2+ Peripheral Pulses, No clubbing, cyanosis, or edema  PSYCH: AAOx3  NEUROLOGY: non-focal  SKIN: No rashes or lesions    LABS:    WBC Trend: 4.35<--, 4.66<--, 4.56<--  Hb Trend: 7.5<--, 7.8<--, 7.5<--, 7.5<--, 7.3<--  Plt Trend: 237<--, 261<--, 242<--, 245<--, 277<--  03-13    144  |  104  |  62<H>  ----------------------------<  74  4.4   |  26  |  4.44<H>    Ca    8.2<L>      13 Mar 2018 06:30  Phos  4.6     03-13  Mg     1.9     03-13      Creatinine Trend: 4.44<--, 4.48<--, 4.48<--, 4.45<--, 4.15<--, 4.63<--  ( 13 Mar 2018 06:30 )   PT: 13.6 SEC;   INR: 1.18 ;       PTT:42.7 SEC        Consultant(s) Notes Reviewed:  Vascular	    Care Discussed with Consultants/Other Providers: Vascular Patient is a 63y old  Male who presents with a chief complaint of 63M PMH DM, HTN, CKD p/w L foot pain x 3 days. (19 Feb 2018 10:39)      SUBJECTIVE / OVERNIGHT EVENTS: No acute overnight events. Patient denies cp/sob. Urine output 800cc over last 24 hours     MEDICATIONS  (STANDING):  aspirin enteric coated 81 milliGRAM(s) Oral daily  atorvastatin 80 milliGRAM(s) Oral at bedtime  cyanocobalamin 1000 MICROGram(s) Oral daily  dextrose 5%. 1000 milliLiter(s) (50 mL/Hr) IV Continuous <Continuous>  dextrose 50% Injectable 12.5 Gram(s) IV Push once  dextrose 50% Injectable 25 Gram(s) IV Push once  dextrose 50% Injectable 25 Gram(s) IV Push once  heparin  Injectable 5000 Unit(s) SubCutaneous every 8 hours  influenza   Vaccine 0.5 milliLiter(s) IntraMuscular once  insulin lispro (HumaLOG) corrective regimen sliding scale   SubCutaneous three times a day before meals  labetalol 300 milliGRAM(s) Oral three times a day  lactobacillus acidophilus 1 Tablet(s) Oral daily  multivitamin 1 Tablet(s) Oral daily  NIFEdipine XL 60 milliGRAM(s) Oral daily  silver sulfADIAZINE 1% Cream 1 Application(s) Topical daily  sodium bicarbonate 650 milliGRAM(s) Oral every 12 hours    MEDICATIONS  (PRN):  acetaminophen   Tablet 650 milliGRAM(s) Oral every 6 hours PRN For Temp greater than 38 C (100.4 F)  acetaminophen   Tablet. 650 milliGRAM(s) Oral every 6 hours PRN Moderate Pain (4 - 6)  dextrose Gel 1 Dose(s) Oral once PRN Blood Glucose LESS THAN 70 milliGRAM(s)/deciliter  glucagon  Injectable 1 milliGRAM(s) IntraMuscular once PRN Glucose LESS THAN 70 milligrams/deciliter        CAPILLARY BLOOD GLUCOSE      POCT Blood Glucose.: 112 mg/dL (13 Mar 2018 21:41)  POCT Blood Glucose.: 94 mg/dL (13 Mar 2018 17:10)  POCT Blood Glucose.: 158 mg/dL (13 Mar 2018 12:07)  POCT Blood Glucose.: 84 mg/dL (13 Mar 2018 08:33)    I&O's Summary    12 Mar 2018 07:01  -  13 Mar 2018 07:00  --------------------------------------------------------  IN: 240 mL / OUT: 1025 mL / NET: -785 mL    13 Mar 2018 07:01  -  14 Mar 2018 06:55  --------------------------------------------------------  IN: 180 mL / OUT: 800 mL / NET: -620 mL        PHYSICAL EXAM:  GENERAL: NAD, well-developed  HEAD:  Atraumatic, Normocephalic  EYES: EOMI, PERRLA, conjunctiva and sclera clear  NECK: Supple, No JVD  CHEST/LUNG: Fine bibasilar rales that do not clear with coughing. No wheeze  HEART: Regular rate and rhythm; No murmurs, rubs, or gallops  ABDOMEN: Soft, Nontender, Nondistended; Bowel sounds present  EXTREMITIES:  2+ Peripheral Pulses, s/p R BKA. Left foot dressed; unable to palpate DP  PSYCH: AAOx3  NEUROLOGY: non-focal  SKIN: No rashes or lesions    LABS:    WBC Trend: 4.35<--, 4.66<--, 4.56<--  Hb Trend: 7.5<--, 7.8<--, 7.5<--, 7.5<--, 7.3<--  Plt Trend: 237<--, 261<--, 242<--, 245<--, 277<--  03-13    144  |  104  |  62<H>  ----------------------------<  74  4.4   |  26  |  4.44<H>    Ca    8.2<L>      13 Mar 2018 06:30  Phos  4.6     03-13  Mg     1.9     03-13      Creatinine Trend: 4.44<--, 4.48<--, 4.48<--, 4.45<--, 4.15<--, 4.63<--  ( 13 Mar 2018 06:30 )   PT: 13.6 SEC;   INR: 1.18 ;       PTT:42.7 SEC        Consultant(s) Notes Reviewed:  Vascular	    Care Discussed with Consultants/Other Providers: Vascular Patient is a 63y old  Male who presents with a chief complaint of 63M PMH DM, HTN, CKD p/w L foot pain x 3 days. (19 Feb 2018 10:39)      SUBJECTIVE / OVERNIGHT EVENTS: No acute overnight events. Patient denies cp/sob. Urine output 800cc over last 24 hours     MEDICATIONS  (STANDING):  aspirin enteric coated 81 milliGRAM(s) Oral daily  atorvastatin 80 milliGRAM(s) Oral at bedtime  cyanocobalamin 1000 MICROGram(s) Oral daily  dextrose 5%. 1000 milliLiter(s) (50 mL/Hr) IV Continuous <Continuous>  dextrose 50% Injectable 12.5 Gram(s) IV Push once  dextrose 50% Injectable 25 Gram(s) IV Push once  dextrose 50% Injectable 25 Gram(s) IV Push once  heparin  Injectable 5000 Unit(s) SubCutaneous every 8 hours  influenza   Vaccine 0.5 milliLiter(s) IntraMuscular once  insulin lispro (HumaLOG) corrective regimen sliding scale   SubCutaneous three times a day before meals  labetalol 300 milliGRAM(s) Oral three times a day  lactobacillus acidophilus 1 Tablet(s) Oral daily  multivitamin 1 Tablet(s) Oral daily  NIFEdipine XL 60 milliGRAM(s) Oral daily  silver sulfADIAZINE 1% Cream 1 Application(s) Topical daily  sodium bicarbonate 650 milliGRAM(s) Oral every 12 hours    MEDICATIONS  (PRN):  acetaminophen   Tablet 650 milliGRAM(s) Oral every 6 hours PRN For Temp greater than 38 C (100.4 F)  acetaminophen   Tablet. 650 milliGRAM(s) Oral every 6 hours PRN Moderate Pain (4 - 6)  dextrose Gel 1 Dose(s) Oral once PRN Blood Glucose LESS THAN 70 milliGRAM(s)/deciliter  glucagon  Injectable 1 milliGRAM(s) IntraMuscular once PRN Glucose LESS THAN 70 milligrams/deciliter        CAPILLARY BLOOD GLUCOSE      POCT Blood Glucose.: 112 mg/dL (13 Mar 2018 21:41)  POCT Blood Glucose.: 94 mg/dL (13 Mar 2018 17:10)  POCT Blood Glucose.: 158 mg/dL (13 Mar 2018 12:07)  POCT Blood Glucose.: 84 mg/dL (13 Mar 2018 08:33)    I&O's Summary    12 Mar 2018 07:01  -  13 Mar 2018 07:00  --------------------------------------------------------  IN: 240 mL / OUT: 1025 mL / NET: -785 mL    13 Mar 2018 07:01  -  14 Mar 2018 06:55  --------------------------------------------------------  IN: 180 mL / OUT: 800 mL / NET: -620 mL        PHYSICAL EXAM:  GENERAL: NAD, well-developed  HEAD:  Atraumatic, Normocephalic  EYES: EOMI, PERRLA, conjunctiva and sclera clear  NECK: Supple, No JVD  CHEST/LUNG: Fine bibasilar rales that do not clear with coughing. No wheeze  HEART: Regular rate and rhythm; No murmurs, rubs, or gallops  ABDOMEN: Soft, Nontender, Nondistended; Bowel sounds present  EXTREMITIES:  2+ Peripheral Pulses, s/p R BKA. Left foot dressed; unable to palpate DP  PSYCH: AAOx3  NEUROLOGY: non-focal  SKIN: No rashes or lesions    LABS:    WBC Trend: 4.35<--, 4.66<--, 4.56<--  Hb Trend: 7.5<--, 7.8<--, 7.5<--, 7.5<--, 7.3<--  Plt Trend: 237<--, 261<--, 242<--, 245<--, 277<--  03-13    144  |  104  |  62<H>  ----------------------------<  74  4.4   |  26  |  4.44<H>    Ca    8.2<L>      13 Mar 2018 06:30  Phos  4.6     03-13  Mg     1.9     03-13      Creatinine Trend: 4.44<--, 4.48<--, 4.48<--, 4.45<--, 4.15<--, 4.63<--  ( 13 Mar 2018 06:30 )   PT: 13.6 SEC;   INR: 1.18 ;       PTT:42.7 SEC    RADIOLOGY    POCUS Lung: B-lines bilaterally in anterior lung fields; moderate size pleural effusion on right; small pleural effusion on left    Consultant(s) Notes Reviewed:  Vascular	    Care Discussed with Consultants/Other Providers: Vascular

## 2018-03-14 NOTE — PROGRESS NOTE ADULT - SUBJECTIVE AND OBJECTIVE BOX
[facility] DIVISION OF KIDNEY DISEASES AND HYPERTENSION -- PROGRESS NOTE    Chief complaint: CKD    24 hour events/subjective: no acute events noted        PAST HISTORY  --------------------------------------------------------------------------------  No significant changes to PMH, PSH, FHx, SHx, unless otherwise noted    ALLERGIES & MEDICATIONS  --------------------------------------------------------------------------------  Allergies    No Known Allergies    Intolerances      Standing Inpatient Medications  aspirin enteric coated 81 milliGRAM(s) Oral daily  atorvastatin 80 milliGRAM(s) Oral at bedtime  cyanocobalamin 1000 MICROGram(s) Oral daily  dextrose 5%. 1000 milliLiter(s) IV Continuous <Continuous>  dextrose 50% Injectable 12.5 Gram(s) IV Push once  dextrose 50% Injectable 25 Gram(s) IV Push once  dextrose 50% Injectable 25 Gram(s) IV Push once  furosemide    Tablet 60 milliGRAM(s) Oral once  furosemide   Injectable 20 milliGRAM(s) IV Push once  heparin  Injectable 5000 Unit(s) SubCutaneous every 8 hours  influenza   Vaccine 0.5 milliLiter(s) IntraMuscular once  insulin lispro (HumaLOG) corrective regimen sliding scale   SubCutaneous three times a day before meals  labetalol 300 milliGRAM(s) Oral three times a day  lactobacillus acidophilus 1 Tablet(s) Oral daily  multivitamin 1 Tablet(s) Oral daily  NIFEdipine XL 60 milliGRAM(s) Oral daily  silver sulfADIAZINE 1% Cream 1 Application(s) Topical daily  sodium bicarbonate 650 milliGRAM(s) Oral every 12 hours    PRN Inpatient Medications  acetaminophen   Tablet 650 milliGRAM(s) Oral every 6 hours PRN  acetaminophen   Tablet. 650 milliGRAM(s) Oral every 6 hours PRN  dextrose Gel 1 Dose(s) Oral once PRN  glucagon  Injectable 1 milliGRAM(s) IntraMuscular once PRN      REVIEW OF SYSTEMS  --------------------------------------------------------------------------------  Constitutional: [x ] no Fever [ ] Chills [ ] Fatigue [ ] Weight change   HEENT: [ ] Blurred vision [ ] Eye Pain [ ] Headache [ ] Runny nose [ ] Sore Throat   Respiratory: [ ] Cough [ ] Wheezing [x ] no Shortness of breath  Cardiovascular: [ ] Chest Pain [ ] Palpitations [ ] MENSAH [ ] PND [ ] Orthopnea  Gastrointestinal: [ ] Abdominal Pain [ ] Diarrhea [ ] Constipation [ ] Hemorrhoids [ ] Nausea [ ] Vomiting  Genitourinary: [ ] Nocturia [ ] Dysuria [ ] Incontinence  Extremities: [ ] Swelling [ ] Joint Pain  Neurologic: [ ] Focal deficit [ ] Paresthesias [ ] Syncope  Lymphatic: [ ] Swelling [ ] Lymphadenopathy   Skin: [ ] Rash [ ] Ecchymoses [ ] Wounds [ ] Lesions  Psychiatry: [ ] Depression [ ] Suicidal/Homicidal Ideation [ ] Anxiety [ ] Sleep Disturbances  [x ] 10 point review of systems is otherwise negative except as mentioned above              [ ]Unable to obtain due to   All other systems were reviewed and are negative, except as noted.    VITALS/PHYSICAL EXAM  --------------------------------------------------------------------------------  T(C): 36.9 (03-14-18 @ 13:57), Max: 37.3 (03-13-18 @ 20:51)  HR: 65 (03-14-18 @ 13:57) (56 - 70)  BP: 126/65 (03-14-18 @ 13:57) (126/65 - 159/70)  RR: 18 (03-14-18 @ 13:57) (18 - 18)  SpO2: 95% (03-14-18 @ 13:57) (93% - 95%)  Wt(kg): --    Weight (kg): 75.4 (03-14-18 @ 05:28)      03-13-18 @ 07:01  -  03-14-18 @ 07:00  --------------------------------------------------------  IN: 180 mL / OUT: 800 mL / NET: -620 mL      Physical Exam:  	Gen: Resting in bed   	HEENT: No JVD  	Pulm: CTA B/L  	CV: S1S2+  	Abd: soft  	LE: Right BKA, LLE edema present, left foot dressing seen   	Neuro: Awake and alert   	Psych: Normal affect and mood  	Skin: Warm    LABS/STUDIES  --------------------------------------------------------------------------------              7.4    4.14  >-----------<  260      [03-14-18 @ 06:15]              23.8     142  |  104  |  63  ----------------------------<  76      [03-14-18 @ 06:15]  4.4   |  23  |  4.40        Ca     8.4     [03-14-18 @ 06:15]      Mg     1.8     [03-14-18 @ 06:15]      Phos  5.0     [03-14-18 @ 06:15]      PT/INR: PT 13.6 , INR 1.18       [03-13-18 @ 06:30]  PTT: 42.7       [03-13-18 @ 06:30]      Creatinine Trend:  SCr 4.40 [03-14 @ 06:15]  SCr 4.44 [03-13 @ 06:30]  SCr 4.48 [03-12 @ 05:13]  SCr 4.48 [03-11 @ 06:45]  SCr 4.45 [03-10 @ 06:40]    Urinalysis - [02-25-18 @ 04:50]      Color PLYEL / Appearance CLEAR / SG 1.015 / pH 5.5      Gluc NEGATIVE / Ketone NEGATIVE  / Bili NEGATIVE / Urobili NORMAL       Blood TRACE / Protein 100 / Leuk Est TRACE / Nitrite NEGATIVE      RBC 0-2 / WBC 2-5 / Hyaline  / Gran  / Sq Epi OCC / Non Sq Epi  / Bacteria       Iron 21, TIBC 163, %sat --      [02-20-18 @ 05:00]  Ferritin 540.7      [02-20-18 @ 05:00]  HbA1c 5.9      [02-13-18 @ 07:11]  TSH 3.22      [03-08-18 @ 06:00]      Free Light Chains: kappa 20.50, lambda 11.20, ratio = 1.83 H      [02-25 @ 07:43]

## 2018-03-14 NOTE — PROGRESS NOTE ADULT - ATTENDING COMMENTS
I have personally seen and examined patient.   I discussed the case with Dr. Nunez and I agree with findings and plan as detailed per note above, which I have amended where appropriate.      This is a 63M PMH of DM on januvia, HTN, CKD, Rt BKA in 2009 who p/w sepsis 2/2 cellulitis and LLE SFA disease, c/b ATN, now planned for Lt fem-pop bypass.  -pt resting comfortably in bed and without dyspnea/SOB, noted to have bilateral crackles on exam and bedside lung u/s with bilateral b-lines; given lasix 60mg IV today to optimize for surgery  -monitoring creat, avoid nephrotoxins  -planned for fem-pop bypass on 3/15; NPO past midnight

## 2018-03-14 NOTE — PROGRESS NOTE ADULT - PROBLEM SELECTOR PLAN 10
-Patient is moderate risk for moderate risk procedure. RSCI 3; will medically optimize patient and attain euvolemia for procedure tomorrow AM. -Patient is moderate risk for moderate risk procedure. RSCI 3; will medically optimize patient and maintain euvolemia for procedure tomorrow AM.    Dalton Nunez D.O.  PGY-1 EM/IM  Pager #[phone] -Patient is high risk for moderate risk procedure. RCRI 3; will medically optimize patient and maintain euvolemia for procedure tomorrow AM.    Dalton Nunez D.O.  PGY-1 EM/IM  Pager #[phone]

## 2018-03-14 NOTE — PROGRESS NOTE ADULT - PROBLEM SELECTOR PLAN 4
-S/p angiogram. Scheduled for fem-pop bypass 03/15. Would help with wound healing. Consented via surrogate, Eres (patient's brother) & patient agreeable.  -L foot with cellulitis with resulting sepsis which is now improved since admission  S/p vancomycin and zosyn. Completed Unasyn on 2/22.  Podiatry has evaluated patient and report good improvement. recs appreciated.    Wound culture grew Acinetobacter, corynebacterium, staph haemolyticus and staph aureus. Blood cx with NGTD.  -LLE xrays negative for free air; CT read negative for free air. -S/p angiogram. Scheduled for fem-pop bypass 03/15. Would help with wound healing. Consented via surrogate, Eres (patient's brother) & patient agreeable to surgery.   -L foot with cellulitis with resulting sepsis which is now improved since admission  S/p vancomycin and zosyn. Completed Unasyn on 2/22.  Podiatry has evaluated patient and report good improvement. recs appreciated.    Wound culture grew Acinetobacter, corynebacterium, staph haemolyticus and staph aureus. Blood cx with NGTD.  -LLE xrays negative for free air; CT read negative for free air.

## 2018-03-14 NOTE — PROGRESS NOTE ADULT - SUBJECTIVE AND OBJECTIVE BOX
C Team Vascular Surgery Progress Note (p [id number])    SUBJECTIVE:  The patient was seen and examined this morning during rounds. No acute events overnight. Pain controlled.    OBJECTIVE:     ** VITAL SIGNS / I&O's **    Vital Signs Last 24 Hrs  T(C): 37.1 (14 Mar 2018 05:28), Max: 37.3 (13 Mar 2018 20:51)  T(F): 98.7 (14 Mar 2018 05:28), Max: 99.1 (13 Mar 2018 20:51)  HR: 70 (14 Mar 2018 05:28) (56 - 70)  BP: 149/56 (14 Mar 2018 05:28) (133/49 - 159/70)  BP(mean): --  RR: 18 (14 Mar 2018 05:28) (17 - 18)  SpO2: 94% (14 Mar 2018 05:28) (93% - 94%)      13 Mar 2018 07:01  -  14 Mar 2018 07:00  --------------------------------------------------------  IN:    Oral Fluid: 180 mL  Total IN: 180 mL    OUT:    Voided: 800 mL  Total OUT: 800 mL    Total NET: -620 mL          ** PHYSICAL EXAM **    PE:  Gen: Alert, NAD  Ext: LLE w/ kerlex dressing in place; s/p R BKA, healed left toe wound stable      ** LABS **                          7.4    4.14  )-----------( 260      ( 14 Mar 2018 06:15 )             23.8     14 Mar 2018 06:15    142    |  104    |  63     ----------------------------<  76     4.4     |  23     |  4.40     Ca    8.4        14 Mar 2018 06:15  Phos  5.0       14 Mar 2018 06:15  Mg     1.8       14 Mar 2018 06:15      PT/INR - ( 13 Mar 2018 06:30 )   PT: 13.6 SEC;   INR: 1.18          PTT - ( 13 Mar 2018 06:30 )  PTT:42.7 SEC  CAPILLARY BLOOD GLUCOSE      POCT Blood Glucose.: 73 mg/dL (14 Mar 2018 08:35)  POCT Blood Glucose.: 112 mg/dL (13 Mar 2018 21:41)  POCT Blood Glucose.: 94 mg/dL (13 Mar 2018 17:10)  POCT Blood Glucose.: 158 mg/dL (13 Mar 2018 12:07)              MEDICATIONS  (STANDING):  aspirin enteric coated 81 milliGRAM(s) Oral daily  atorvastatin 80 milliGRAM(s) Oral at bedtime  cyanocobalamin 1000 MICROGram(s) Oral daily  dextrose 5%. 1000 milliLiter(s) (50 mL/Hr) IV Continuous <Continuous>  dextrose 50% Injectable 12.5 Gram(s) IV Push once  dextrose 50% Injectable 25 Gram(s) IV Push once  dextrose 50% Injectable 25 Gram(s) IV Push once  furosemide    Tablet 60 milliGRAM(s) Oral once  heparin  Injectable 5000 Unit(s) SubCutaneous every 8 hours  influenza   Vaccine 0.5 milliLiter(s) IntraMuscular once  insulin lispro (HumaLOG) corrective regimen sliding scale   SubCutaneous three times a day before meals  labetalol 300 milliGRAM(s) Oral three times a day  lactobacillus acidophilus 1 Tablet(s) Oral daily  multivitamin 1 Tablet(s) Oral daily  NIFEdipine XL 60 milliGRAM(s) Oral daily  silver sulfADIAZINE 1% Cream 1 Application(s) Topical daily  sodium bicarbonate 650 milliGRAM(s) Oral every 12 hours    MEDICATIONS  (PRN):  acetaminophen   Tablet 650 milliGRAM(s) Oral every 6 hours PRN For Temp greater than 38 C (100.4 F)  acetaminophen   Tablet. 650 milliGRAM(s) Oral every 6 hours PRN Moderate Pain (4 - 6)  dextrose Gel 1 Dose(s) Oral once PRN Blood Glucose LESS THAN 70 milliGRAM(s)/deciliter  glucagon  Injectable 1 milliGRAM(s) IntraMuscular once PRN Glucose LESS THAN 70 milligrams/deciliter C Team Vascular Surgery Progress Note (p [id number])    SUBJECTIVE:  The patient was seen and examined this morning during rounds. No acute events overnight. Pain controlled.    OBJECTIVE:     ** VITAL SIGNS / I&O's **    Vital Signs Last 24 Hrs  T(C): 37.1 (14 Mar 2018 05:28), Max: 37.3 (13 Mar 2018 20:51)  T(F): 98.7 (14 Mar 2018 05:28), Max: 99.1 (13 Mar 2018 20:51)  HR: 70 (14 Mar 2018 05:28) (56 - 70)  BP: 149/56 (14 Mar 2018 05:28) (133/49 - 159/70)  BP(mean): --  RR: 18 (14 Mar 2018 05:28) (17 - 18)  SpO2: 94% (14 Mar 2018 05:28) (93% - 94%)      13 Mar 2018 07:01  -  14 Mar 2018 07:00  --------------------------------------------------------  IN:    Oral Fluid: 180 mL  Total IN: 180 mL    OUT:    Voided: 800 mL  Total OUT: 800 mL    Total NET: -620 mL          ** PHYSICAL EXAM **    PE:  Gen: Alert, NAD  Ext: LLE w/ kerlex dressing in place; s/p R BKA, healed left toe wound stable w eschar       ** LABS **                          7.4    4.14  )-----------( 260      ( 14 Mar 2018 06:15 )             23.8     14 Mar 2018 06:15    142    |  104    |  63     ----------------------------<  76     4.4     |  23     |  4.40     Ca    8.4        14 Mar 2018 06:15  Phos  5.0       14 Mar 2018 06:15  Mg     1.8       14 Mar 2018 06:15      PT/INR - ( 13 Mar 2018 06:30 )   PT: 13.6 SEC;   INR: 1.18          PTT - ( 13 Mar 2018 06:30 )  PTT:42.7 SEC  CAPILLARY BLOOD GLUCOSE      POCT Blood Glucose.: 73 mg/dL (14 Mar 2018 08:35)  POCT Blood Glucose.: 112 mg/dL (13 Mar 2018 21:41)  POCT Blood Glucose.: 94 mg/dL (13 Mar 2018 17:10)  POCT Blood Glucose.: 158 mg/dL (13 Mar 2018 12:07)              MEDICATIONS  (STANDING):  aspirin enteric coated 81 milliGRAM(s) Oral daily  atorvastatin 80 milliGRAM(s) Oral at bedtime  cyanocobalamin 1000 MICROGram(s) Oral daily  dextrose 5%. 1000 milliLiter(s) (50 mL/Hr) IV Continuous <Continuous>  dextrose 50% Injectable 12.5 Gram(s) IV Push once  dextrose 50% Injectable 25 Gram(s) IV Push once  dextrose 50% Injectable 25 Gram(s) IV Push once  furosemide    Tablet 60 milliGRAM(s) Oral once  heparin  Injectable 5000 Unit(s) SubCutaneous every 8 hours  influenza   Vaccine 0.5 milliLiter(s) IntraMuscular once  insulin lispro (HumaLOG) corrective regimen sliding scale   SubCutaneous three times a day before meals  labetalol 300 milliGRAM(s) Oral three times a day  lactobacillus acidophilus 1 Tablet(s) Oral daily  multivitamin 1 Tablet(s) Oral daily  NIFEdipine XL 60 milliGRAM(s) Oral daily  silver sulfADIAZINE 1% Cream 1 Application(s) Topical daily  sodium bicarbonate 650 milliGRAM(s) Oral every 12 hours    MEDICATIONS  (PRN):  acetaminophen   Tablet 650 milliGRAM(s) Oral every 6 hours PRN For Temp greater than 38 C (100.4 F)  acetaminophen   Tablet. 650 milliGRAM(s) Oral every 6 hours PRN Moderate Pain (4 - 6)  dextrose Gel 1 Dose(s) Oral once PRN Blood Glucose LESS THAN 70 milliGRAM(s)/deciliter  glucagon  Injectable 1 milliGRAM(s) IntraMuscular once PRN Glucose LESS THAN 70 milligrams/deciliter

## 2018-03-14 NOTE — PROGRESS NOTE ADULT - PROBLEM SELECTOR PLAN 1
Creatinine remains 4.4. ATN on CKD stage III in the setting of sepsis. 4.4 may be his new baseline creatinine. S/p angiogram.  Continuing to monitor urine outputs, trend sCr daily Creatinine remains 4.4. ATN on CKD stage III in the setting of sepsis. 4.4 may be his new baseline creatinine. S/p angiogram.  Continuing to monitor urine outputs, trend sCr daily; patient not oliguric.

## 2018-03-15 LAB
APTT BLD: 35 SEC — SIGNIFICANT CHANGE UP (ref 27.5–37.4)
BASOPHILS # BLD AUTO: 0.06 K/UL — SIGNIFICANT CHANGE UP (ref 0–0.2)
BASOPHILS NFR BLD AUTO: 1.3 % — SIGNIFICANT CHANGE UP (ref 0–2)
BLD GP AB SCN SERPL QL: NEGATIVE — SIGNIFICANT CHANGE UP
BUN SERPL-MCNC: 61 MG/DL — HIGH (ref 7–23)
CALCIUM SERPL-MCNC: 8.7 MG/DL — SIGNIFICANT CHANGE UP (ref 8.4–10.5)
CHLORIDE SERPL-SCNC: 103 MMOL/L — SIGNIFICANT CHANGE UP (ref 98–107)
CO2 SERPL-SCNC: 21 MMOL/L — LOW (ref 22–31)
CREAT SERPL-MCNC: 4.34 MG/DL — HIGH (ref 0.5–1.3)
EOSINOPHIL # BLD AUTO: 0.31 K/UL — SIGNIFICANT CHANGE UP (ref 0–0.5)
EOSINOPHIL NFR BLD AUTO: 7 % — HIGH (ref 0–6)
GLUCOSE BLDC GLUCOMTR-MCNC: 103 MG/DL — HIGH (ref 70–99)
GLUCOSE BLDC GLUCOMTR-MCNC: 172 MG/DL — HIGH (ref 70–99)
GLUCOSE BLDC GLUCOMTR-MCNC: 69 MG/DL — LOW (ref 70–99)
GLUCOSE BLDC GLUCOMTR-MCNC: 86 MG/DL — SIGNIFICANT CHANGE UP (ref 70–99)
GLUCOSE BLDC GLUCOMTR-MCNC: 88 MG/DL — SIGNIFICANT CHANGE UP (ref 70–99)
GLUCOSE BLDC GLUCOMTR-MCNC: 99 MG/DL — SIGNIFICANT CHANGE UP (ref 70–99)
GLUCOSE SERPL-MCNC: 74 MG/DL — SIGNIFICANT CHANGE UP (ref 70–99)
HCT VFR BLD CALC: 32.1 % — LOW (ref 39–50)
HGB BLD-MCNC: 10.2 G/DL — LOW (ref 13–17)
IMM GRANULOCYTES # BLD AUTO: 0.01 # — SIGNIFICANT CHANGE UP
IMM GRANULOCYTES NFR BLD AUTO: 0.2 % — SIGNIFICANT CHANGE UP (ref 0–1.5)
INR BLD: 1.14 — SIGNIFICANT CHANGE UP (ref 0.88–1.17)
LYMPHOCYTES # BLD AUTO: 0.9 K/UL — LOW (ref 1–3.3)
LYMPHOCYTES # BLD AUTO: 20.2 % — SIGNIFICANT CHANGE UP (ref 13–44)
MAGNESIUM SERPL-MCNC: 1.9 MG/DL — SIGNIFICANT CHANGE UP (ref 1.6–2.6)
MCHC RBC-ENTMCNC: 27.1 PG — SIGNIFICANT CHANGE UP (ref 27–34)
MCHC RBC-ENTMCNC: 31.8 % — LOW (ref 32–36)
MCV RBC AUTO: 85.4 FL — SIGNIFICANT CHANGE UP (ref 80–100)
MONOCYTES # BLD AUTO: 0.59 K/UL — SIGNIFICANT CHANGE UP (ref 0–0.9)
MONOCYTES NFR BLD AUTO: 13.2 % — SIGNIFICANT CHANGE UP (ref 2–14)
NEUTROPHILS # BLD AUTO: 2.59 K/UL — SIGNIFICANT CHANGE UP (ref 1.8–7.4)
NEUTROPHILS NFR BLD AUTO: 58.1 % — SIGNIFICANT CHANGE UP (ref 43–77)
NRBC # FLD: 0 — SIGNIFICANT CHANGE UP
PHOSPHATE SERPL-MCNC: 5.3 MG/DL — HIGH (ref 2.5–4.5)
PLATELET # BLD AUTO: 241 K/UL — SIGNIFICANT CHANGE UP (ref 150–400)
PMV BLD: 11.5 FL — SIGNIFICANT CHANGE UP (ref 7–13)
POTASSIUM SERPL-MCNC: 4.7 MMOL/L — SIGNIFICANT CHANGE UP (ref 3.5–5.3)
POTASSIUM SERPL-SCNC: 4.7 MMOL/L — SIGNIFICANT CHANGE UP (ref 3.5–5.3)
PROTHROM AB SERPL-ACNC: 13.2 SEC — HIGH (ref 9.8–13.1)
RBC # BLD: 3.76 M/UL — LOW (ref 4.2–5.8)
RBC # FLD: 14.9 % — HIGH (ref 10.3–14.5)
RH IG SCN BLD-IMP: POSITIVE — SIGNIFICANT CHANGE UP
SODIUM SERPL-SCNC: 141 MMOL/L — SIGNIFICANT CHANGE UP (ref 135–145)
WBC # BLD: 4.46 K/UL — SIGNIFICANT CHANGE UP (ref 3.8–10.5)
WBC # FLD AUTO: 4.46 K/UL — SIGNIFICANT CHANGE UP (ref 3.8–10.5)

## 2018-03-15 PROCEDURE — 99233 SBSQ HOSP IP/OBS HIGH 50: CPT | Mod: GC

## 2018-03-15 PROCEDURE — 99232 SBSQ HOSP IP/OBS MODERATE 35: CPT

## 2018-03-15 RX ORDER — SODIUM CHLORIDE 9 MG/ML
500 INJECTION INTRAMUSCULAR; INTRAVENOUS; SUBCUTANEOUS ONCE
Refills: 0 | Status: DISCONTINUED | OUTPATIENT
Start: 2018-03-15 | End: 2018-03-15

## 2018-03-15 RX ORDER — FUROSEMIDE 40 MG
60 TABLET ORAL ONCE
Refills: 0 | Status: COMPLETED | OUTPATIENT
Start: 2018-03-15 | End: 2018-03-15

## 2018-03-15 RX ADMIN — HEPARIN SODIUM 5000 UNIT(S): 5000 INJECTION INTRAVENOUS; SUBCUTANEOUS at 05:37

## 2018-03-15 RX ADMIN — Medication 1 TABLET(S): at 14:35

## 2018-03-15 RX ADMIN — Medication 300 MILLIGRAM(S): at 05:37

## 2018-03-15 RX ADMIN — PREGABALIN 1000 MICROGRAM(S): 225 CAPSULE ORAL at 14:35

## 2018-03-15 RX ADMIN — HEPARIN SODIUM 5000 UNIT(S): 5000 INJECTION INTRAVENOUS; SUBCUTANEOUS at 22:08

## 2018-03-15 RX ADMIN — Medication 1 APPLICATION(S): at 14:34

## 2018-03-15 RX ADMIN — Medication 650 MILLIGRAM(S): at 20:22

## 2018-03-15 RX ADMIN — Medication 60 MILLIGRAM(S): at 08:37

## 2018-03-15 RX ADMIN — Medication 300 MILLIGRAM(S): at 22:08

## 2018-03-15 RX ADMIN — Medication 60 MILLIGRAM(S): at 05:37

## 2018-03-15 RX ADMIN — Medication 650 MILLIGRAM(S): at 05:37

## 2018-03-15 RX ADMIN — ATORVASTATIN CALCIUM 80 MILLIGRAM(S): 80 TABLET, FILM COATED ORAL at 22:08

## 2018-03-15 NOTE — PROGRESS NOTE ADULT - ATTENDING COMMENTS
I have personally seen and examined patient.   I discussed the case with Dr. Nunez and I agree with findings and plan as detailed per note above, which I have amended where appropriate.      This is a 63M PMH of DM on januvia, HTN, CKD, Rt BKA in 2009 who p/w sepsis 2/2 cellulitis and LLE SFA disease, c/b ATN, now planned for Lt fem-pop bypass today.    -Received 2 units PRBC overnight.  Given additional lasix 60mg IV this morning to optimize for surgery.  Pt was sitting up in bed, and normoxic on room air at time of my exam.  -monitoring creat, avoid nephrotoxins  -medical co-management team will follow post-op

## 2018-03-15 NOTE — PROGRESS NOTE ADULT - PROBLEM SELECTOR PLAN 2
Likely due to pulmonary edema given acuity and CXR confirming such; possibly due to acute on chronic HFpEF in the setting of JANY.   - Monitor I's and O's & oxygen saturation  - s/p 2 unit pRBC; rales noted on exam; will give 60 Lasix IVP this AM. Likely due to pulmonary edema given acuity and CXR confirming such; possibly due to acute on chronic HFpEF in the setting of JANY.   - Monitor I's and O's & oxygen saturation  - s/p 2 unit pRBC; rales noted on exam; will give 60 Lasix IVP, now saturating 99% on RA.

## 2018-03-15 NOTE — PROGRESS NOTE ADULT - PROBLEM SELECTOR PLAN 10
-Patient is high risk for moderate risk procedure. RCRI 3; will medically optimize patient and maintain euvolemia for surgery this afternoon.     Dalton Nunez D.O.  PGY-1 EM/IM  Pager #[phone]

## 2018-03-15 NOTE — PROGRESS NOTE ADULT - PROBLEM SELECTOR PLAN 4
-S/p angiogram. Scheduled for fem-pop bypass 03/15. Would help with wound healing. Consented via surrogate, Eres (patient's brother) & patient agreeable to surgery.   -L foot with cellulitis with resulting sepsis which is now improved since admission  S/p vancomycin and zosyn. Completed Unasyn on 2/22.  Podiatry has evaluated patient and report good improvement. recs appreciated.    Wound culture grew Acinetobacter, corynebacterium, staph haemolyticus and staph aureus. Blood cx with NGTD.  -LLE xrays negative for free air; CT read negative for free air.

## 2018-03-15 NOTE — PROGRESS NOTE ADULT - ASSESSMENT
63M PMHx DM, s/p R BKA, now presenting with Diabetic foot soft tissue infection, w/ angiography showing arterial stenosis of LLE, plan for LLE bypass today    - s/p 2u pRBC for optimization for OR  - OR today 3/15/18  - NPO, IVF   - dvt pxx  - Wound care as per podiatry  - f/u Cr    [id number]

## 2018-03-15 NOTE — PRE-OP CHECKLIST - PATIENT PROBLEMS/NEEDS
[facility] [employer] of Child Health Examination       Student's Name  Mariam Falcon Signature                                                                                                          Title    MD                       Date  12/1/2020   Signature Grade Level/ID#  12th Grade   HEALTH HISTORY          TO BE COMPLETED AND SIGNED BY PARENT/GUARDIAN AND VERIFIED BY HEALTH CARE PROVIDER    ALLERGIES  (Food, drug, insect, other) MEDICATION  (List all prescribed or taken on a regular basis.)     Diagnosis DIABETES SCREENING  BMI>85% age/sex  No And any two of the following:  Family History No   Ethnic Minority  No          Signs of Insulin Resistance (hypertension, dyslipidemia, polycystic ovarian syndrome, acanthosis nigricans)    No           At Risk  No Quick-relief  medication (e.g. Short Acting Beta Antagonist): No          Controller medication (e.g. inhaled corticosteroid):   No Other   NEEDS/MODIFICATIONS required in the school setting  None DIETARY Needs/Restrictions     None   SPECIAL INSTR Patient expressed no known problems or needs

## 2018-03-15 NOTE — PROGRESS NOTE ADULT - SUBJECTIVE AND OBJECTIVE BOX
Patient is a 63y old  Male who presents with a chief complaint of 63M PMH DM, HTN, CKD p/w L foot pain x 3 days. (19 Feb 2018 10:39)      SUBJECTIVE / OVERNIGHT EVENTS: Patient received 2 unit pRBC for pre-op overnight & 1 dose of 20 IVP Lasix subsequently. This AM denies f/c/cp/sob/n/v. Output not recorded overnight, but making urine.     MEDICATIONS  (STANDING):  aspirin enteric coated 81 milliGRAM(s) Oral daily  atorvastatin 80 milliGRAM(s) Oral at bedtime  cyanocobalamin 1000 MICROGram(s) Oral daily  dextrose 5%. 1000 milliLiter(s) (50 mL/Hr) IV Continuous <Continuous>  dextrose 50% Injectable 12.5 Gram(s) IV Push once  dextrose 50% Injectable 25 Gram(s) IV Push once  dextrose 50% Injectable 25 Gram(s) IV Push once  furosemide   Injectable 20 milliGRAM(s) IV Push once  heparin  Injectable 5000 Unit(s) SubCutaneous every 8 hours  influenza   Vaccine 0.5 milliLiter(s) IntraMuscular once  insulin lispro (HumaLOG) corrective regimen sliding scale   SubCutaneous three times a day before meals  labetalol 300 milliGRAM(s) Oral three times a day  lactobacillus acidophilus 1 Tablet(s) Oral daily  multivitamin 1 Tablet(s) Oral daily  NIFEdipine XL 60 milliGRAM(s) Oral daily  silver sulfADIAZINE 1% Cream 1 Application(s) Topical daily  sodium bicarbonate 650 milliGRAM(s) Oral every 12 hours    MEDICATIONS  (PRN):  acetaminophen   Tablet 650 milliGRAM(s) Oral every 6 hours PRN For Temp greater than 38 C (100.4 F)  acetaminophen   Tablet. 650 milliGRAM(s) Oral every 6 hours PRN Moderate Pain (4 - 6)  dextrose Gel 1 Dose(s) Oral once PRN Blood Glucose LESS THAN 70 milliGRAM(s)/deciliter  glucagon  Injectable 1 milliGRAM(s) IntraMuscular once PRN Glucose LESS THAN 70 milligrams/deciliter        CAPILLARY BLOOD GLUCOSE      POCT Blood Glucose.: 86 mg/dL (15 Mar 2018 08:46)  POCT Blood Glucose.: 69 mg/dL (15 Mar 2018 08:31)  POCT Blood Glucose.: 108 mg/dL (14 Mar 2018 22:16)  POCT Blood Glucose.: 124 mg/dL (14 Mar 2018 17:13)  POCT Blood Glucose.: 135 mg/dL (14 Mar 2018 12:09)    I&O's Summary      PHYSICAL EXAM:  GENERAL: no acute distress.   HEAD:  Atraumatic, Normocephalic  EYES: EOMI, PERRLA, conjunctiva and sclera clear  NECK: Supple, No JVD  CHEST/LUNG: Bibasilar crackles, not clearing with coughing.   HEART: Regular rate and rhythm; No murmurs, rubs, or gallops  ABDOMEN: Soft, Nontender, Nondistended; Bowel sounds present  EXTREMITIES:  2+ Peripheral Pulses, No clubbing, cyanosis, or edema  PSYCH: AAOx3  NEUROLOGY: non-focal  SKIN: No rashes or lesions    LABS:  (03-15 @ 07:12)                        10.2  4.46 )-----------( 241                 32.1    Neutrophils = 2.59 (58.1%)  Lymphocytes = 0.90 (20.2%)  Eosinophils = 0.31 (7.0%)  Basophils = 0.06 (1.3%)  Monocytes = 0.59 (13.2%)  Bands = --%    WBC Trend: 4.46<--, 4.05<--, 4.14<--  Hb Trend: 10.2<--, 9.1<--, 7.4<--, 7.5<--, 7.8<--  Plt Trend: 241<--, 239<--, 260<--, 237<--, 261<--  03-15    141  |  103  |  61<H>  ----------------------------<  74  4.7   |  21<L>  |  4.34<H>    Ca    8.7      15 Mar 2018 07:12  Phos  5.3     03-15  Mg     1.9     03-15      Creatinine Trend: 4.34<--, 4.40<--, 4.44<--, 4.48<--, 4.48<--, 4.45<--  ( 15 Mar 2018 07:12 )   PT: 13.2 SEC;   INR: 1.14 ;       PTT:35.0 SEC      Consultant(s) Notes Reviewed:  Vascular    Care Discussed with Consultants/Other Providers: Vascular

## 2018-03-15 NOTE — PROGRESS NOTE ADULT - SUBJECTIVE AND OBJECTIVE BOX
Patient is a 63y old  Male who presents with a chief complaint of 63M PMH DM, HTN, CKD p/w L foot pain x 3 days. (19 Feb 2018 10:39)       INTERVAL HPI/OVERNIGHT EVENTS:  Patient seen and evaluated at bedside.  Pt is resting comfortable in NAD. Denies N/V/F/C.  Pain rated at X/10    Allergies    No Known Allergies    Intolerances        Vital Signs Last 24 Hrs  T(C): 36.7 (15 Mar 2018 05:25), Max: 37.1 (14 Mar 2018 15:00)  T(F): 98.1 (15 Mar 2018 05:25), Max: 98.7 (14 Mar 2018 15:00)  HR: 65 (15 Mar 2018 05:25) (54 - 68)  BP: 157/61 (15 Mar 2018 05:25) (126/65 - 157/61)  BP(mean): --  RR: 18 (15 Mar 2018 05:25) (17 - 18)  SpO2: 90% (15 Mar 2018 05:25) (90% - 95%)    LABS:                        9.1    4.05  )-----------( 239      ( 14 Mar 2018 23:10 )             28.8     03-14    142  |  104  |  63<H>  ----------------------------<  76  4.4   |  23  |  4.40<H>    Ca    8.4      14 Mar 2018 06:15  Phos  5.0     03-14  Mg     1.8     03-14          CAPILLARY BLOOD GLUCOSE      POCT Blood Glucose.: 108 mg/dL (14 Mar 2018 22:16)  POCT Blood Glucose.: 124 mg/dL (14 Mar 2018 17:13)  POCT Blood Glucose.: 135 mg/dL (14 Mar 2018 12:09)  POCT Blood Glucose.: 73 mg/dL (14 Mar 2018 08:35)      Lower Extremity Physical Exam:  Vasular: DP/PT 0/4, B/L, CFT <2 seconds B/L, Temperature gradient warm, B/L.   Neuro: Epicritic sensation absent to the level of toes, B/L.  Musculoskeletal/Ortho: RIGHT BKA.  Skin: Left foot deroofed blister with cherry red non-blanchable trophic changes to plantar aspect of toes 1-5, forefoot, midfoot, no ascending erythema or swelling, no malodor, no purulence, no deep probe, etiology unknown, ROM of toes intact, CFT to each toes normal, no sinus tract, no undermining.     RADIOLOGY & ADDITIONAL TESTS:

## 2018-03-15 NOTE — PROGRESS NOTE ADULT - PROBLEM SELECTOR PLAN 6
s/p 2 units for pre-op optimization. Hgb now 10.2  Folate, B12, TSH all WNL.  Likely 2/2 in setting of decreased EPO production d/t worsening kidney function. Hemolysis less likely. T bili is WNL. Acute blood loss also less likely as no source.

## 2018-03-15 NOTE — CHART NOTE - NSCHARTNOTEFT_GEN_A_CORE
Called by Vascular surgery team and Nephrology attending to evaluate pt. for possible HD.   Pt. did not under go scheduled procedure as per surgical team, he was SOB and tachypneic. Pt. seen and examined in the PACU. Pt. sitting up in bed. Pt. consented for HD; Pt. however states he is only agreeable to HD if it is an absolute emergency and "if I am about to die". Pt. states he needs more time to think about it and will discuss with the team in the morning. Discussed case with vascular team and Dr. William Nephrology attending.

## 2018-03-15 NOTE — PROGRESS NOTE ADULT - SUBJECTIVE AND OBJECTIVE BOX
C Team Vascular Surgery Progress Note (p [id number])    SUBJECTIVE:  The patient was seen and examined this morning during rounds. Received 2u pRBC for low H/H in preparation for OR today.    OBJECTIVE:     ** VITAL SIGNS / I&O's **    Vital Signs Last 24 Hrs  T(C): 36.7 (15 Mar 2018 09:00), Max: 37.1 (14 Mar 2018 15:00)  T(F): 98.1 (15 Mar 2018 09:00), Max: 98.7 (14 Mar 2018 15:00)  HR: 61 (15 Mar 2018 09:00) (54 - 68)  BP: 157/61 (15 Mar 2018 09:00) (126/65 - 157/61)  BP(mean): --  RR: 18 (15 Mar 2018 09:00) (17 - 18)  SpO2: 99% (15 Mar 2018 09:00) (90% - 99%)        ** PHYSICAL EXAM **    -- CONSTITUTIONAL: Alert, NAD.   -- CARDIAC:   -- PULMONARY:   -- ABDOMEN: soft, non-tender, non-distended  -- EXTREMITIES:     ** LABS **                          10.2   4.46  )-----------( 241      ( 15 Mar 2018 07:12 )             32.1     15 Mar 2018 07:12    141    |  103    |  61     ----------------------------<  74     4.7     |  21     |  4.34     Ca    8.7        15 Mar 2018 07:12  Phos  5.3       15 Mar 2018 07:12  Mg     1.9       15 Mar 2018 07:12      PT/INR - ( 15 Mar 2018 07:12 )   PT: 13.2 SEC;   INR: 1.14          PTT - ( 15 Mar 2018 07:12 )  PTT:35.0 SEC  CAPILLARY BLOOD GLUCOSE      POCT Blood Glucose.: 103 mg/dL (15 Mar 2018 09:33)  POCT Blood Glucose.: 86 mg/dL (15 Mar 2018 08:46)  POCT Blood Glucose.: 69 mg/dL (15 Mar 2018 08:31)  POCT Blood Glucose.: 108 mg/dL (14 Mar 2018 22:16)  POCT Blood Glucose.: 124 mg/dL (14 Mar 2018 17:13)  POCT Blood Glucose.: 135 mg/dL (14 Mar 2018 12:09)              MEDICATIONS  (STANDING):  aspirin enteric coated 81 milliGRAM(s) Oral daily  atorvastatin 80 milliGRAM(s) Oral at bedtime  cyanocobalamin 1000 MICROGram(s) Oral daily  dextrose 5%. 1000 milliLiter(s) (50 mL/Hr) IV Continuous <Continuous>  dextrose 50% Injectable 12.5 Gram(s) IV Push once  dextrose 50% Injectable 25 Gram(s) IV Push once  dextrose 50% Injectable 25 Gram(s) IV Push once  furosemide   Injectable 20 milliGRAM(s) IV Push once  heparin  Injectable 5000 Unit(s) SubCutaneous every 8 hours  influenza   Vaccine 0.5 milliLiter(s) IntraMuscular once  insulin lispro (HumaLOG) corrective regimen sliding scale   SubCutaneous three times a day before meals  labetalol 300 milliGRAM(s) Oral three times a day  lactobacillus acidophilus 1 Tablet(s) Oral daily  multivitamin 1 Tablet(s) Oral daily  NIFEdipine XL 60 milliGRAM(s) Oral daily  silver sulfADIAZINE 1% Cream 1 Application(s) Topical daily  sodium bicarbonate 650 milliGRAM(s) Oral every 12 hours    MEDICATIONS  (PRN):  acetaminophen   Tablet 650 milliGRAM(s) Oral every 6 hours PRN For Temp greater than 38 C (100.4 F)  acetaminophen   Tablet. 650 milliGRAM(s) Oral every 6 hours PRN Moderate Pain (4 - 6)  dextrose Gel 1 Dose(s) Oral once PRN Blood Glucose LESS THAN 70 milliGRAM(s)/deciliter  glucagon  Injectable 1 milliGRAM(s) IntraMuscular once PRN Glucose LESS THAN 70 milligrams/deciliter C Team Vascular Surgery Progress Note (p [id number])    SUBJECTIVE:  The patient was seen and examined this morning during rounds. Received 2u pRBC for low H/H in preparation for OR today.    OBJECTIVE:     ** VITAL SIGNS / I&O's **    Vital Signs Last 24 Hrs  T(C): 36.7 (15 Mar 2018 09:00), Max: 37.1 (14 Mar 2018 15:00)  T(F): 98.1 (15 Mar 2018 09:00), Max: 98.7 (14 Mar 2018 15:00)  HR: 61 (15 Mar 2018 09:00) (54 - 68)  BP: 157/61 (15 Mar 2018 09:00) (126/65 - 157/61)  BP(mean): --  RR: 18 (15 Mar 2018 09:00) (17 - 18)  SpO2: 99% (15 Mar 2018 09:00) (90% - 99%)        ** PHYSICAL EXAM **    -- CONSTITUTIONAL: Alert, NAD.   -- CARDIAC:   -- PULMONARY:   -- ABDOMEN: soft, non-tender, non-distended  -- EXTREMITIES:     ** LABS **                          10.2   4.46  )-----------( 241      ( 15 Mar 2018 07:12 )             32.1     15 Mar 2018 07:12    141    |  103    |  61     ----------------------------<  74     4.7     |  21     |  4.34     Ca    8.7        15 Mar 2018 07:12  Phos  5.3       15 Mar 2018 07:12  Mg     1.9       15 Mar 2018 07:12      PT/INR - ( 15 Mar 2018 07:12 )   PT: 13.2 SEC;   INR: 1.14          PTT - ( 15 Mar 2018 07:12 )  PTT:35.0 SEC  CAPILLARY BLOOD GLUCOSE      POCT Blood Glucose.: 103 mg/dL (15 Mar 2018 09:33)  POCT Blood Glucose.: 86 mg/dL (15 Mar 2018 08:46)  POCT Blood Glucose.: 69 mg/dL (15 Mar 2018 08:31)  POCT Blood Glucose.: 108 mg/dL (14 Mar 2018 22:16)  POCT Blood Glucose.: 124 mg/dL (14 Mar 2018 17:13)  POCT Blood Glucose.: 135 mg/dL (14 Mar 2018 12:09)        MEDICATIONS  (STANDING):  aspirin enteric coated 81 milliGRAM(s) Oral daily  atorvastatin 80 milliGRAM(s) Oral at bedtime  cyanocobalamin 1000 MICROGram(s) Oral daily  dextrose 5%. 1000 milliLiter(s) (50 mL/Hr) IV Continuous <Continuous>  dextrose 50% Injectable 12.5 Gram(s) IV Push once  dextrose 50% Injectable 25 Gram(s) IV Push once  dextrose 50% Injectable 25 Gram(s) IV Push once  furosemide   Injectable 20 milliGRAM(s) IV Push once  heparin  Injectable 5000 Unit(s) SubCutaneous every 8 hours  influenza   Vaccine 0.5 milliLiter(s) IntraMuscular once  insulin lispro (HumaLOG) corrective regimen sliding scale   SubCutaneous three times a day before meals  labetalol 300 milliGRAM(s) Oral three times a day  lactobacillus acidophilus 1 Tablet(s) Oral daily  multivitamin 1 Tablet(s) Oral daily  NIFEdipine XL 60 milliGRAM(s) Oral daily  silver sulfADIAZINE 1% Cream 1 Application(s) Topical daily  sodium bicarbonate 650 milliGRAM(s) Oral every 12 hours    MEDICATIONS  (PRN):  acetaminophen   Tablet 650 milliGRAM(s) Oral every 6 hours PRN For Temp greater than 38 C (100.4 F)  acetaminophen   Tablet. 650 milliGRAM(s) Oral every 6 hours PRN Moderate Pain (4 - 6)  dextrose Gel 1 Dose(s) Oral once PRN Blood Glucose LESS THAN 70 milliGRAM(s)/deciliter  glucagon  Injectable 1 milliGRAM(s) IntraMuscular once PRN Glucose LESS THAN 70 milligrams/deciliter

## 2018-03-15 NOTE — PROGRESS NOTE ADULT - ASSESSMENT
LEFT toes, forefoot, midfoot blister (improving).    Plan:  - Pt seen and evaluated  - Appearance of foot continues to improve  - Cont IV abx  - Silvadene to LEFT foot wound daily  - No pod sx intervention at this time  - [facility] planning bypass for Thursday.  - Will cont to follow.

## 2018-03-15 NOTE — PROGRESS NOTE ADULT - PROBLEM SELECTOR PLAN 2
Acidosis in the setting of renal failure and infection. Serum CO2 21 (WNL). Continue with sodium bicarbonate therapy and Monitor serum CO2 Acidosis in the setting of renal failure and infection. Serum CO2 stable at 21. Continue with sodium bicarbonate therapy. Monitor serum CO2.

## 2018-03-15 NOTE — PRE-OP CHECKLIST - SELECT TESTS ORDERED
86/BMP/CBC/CMP/PT/PTT/INR/Type and Cross/Type and Screen/POCT Blood Glucose 93/BMP/CBC/CMP/PT/PTT/INR/Type and Cross/Type and Screen/POCT Blood Glucose 93/BMP/CBC/CMP/PT/PTT/INR/Type and Cross/Type and Screen/EKG/POCT Blood Glucose

## 2018-03-15 NOTE — PROGRESS NOTE ADULT - ATTENDING COMMENTS
Extensive d/w  3  anesthesia attendings   Pt is in some resp distress w dec O2 sat and tachypneis  based on this It was felt that the pt could not safely undergo anesthesia  for lle bypass  and he need at lest  temporary dialysis for uremia and fluid overload   The lle bypass intervention is urgent and not emergency at this time lt toe 1 tip wound/eschar is stable w/o further progression  Will d/w renal to have panda desai anf receive hd over the next week to optimize and tent lle revasc on mon 3/26/2018

## 2018-03-15 NOTE — PROGRESS NOTE ADULT - PROBLEM SELECTOR PLAN 1
Creatinine remains 4.4. ATN on CKD stage III in the setting of sepsis. 4.4 may be his new baseline creatinine. S/p angiogram.  Continuing to monitor urine outputs, trend sCr daily; patient not oliguric.

## 2018-03-15 NOTE — PROGRESS NOTE ADULT - PROBLEM SELECTOR PLAN 1
Pt. with JANY on CKD in the setting of infection and diarrhea. CKD in the setting of long-standing DM. Scr was 1.5 in 3/2017, increased to 2.30 in 7/2017. Scr on admission (2/12) was elevated at 3.81, increased to 6.3 on 2/23 and has now improved, remaining stable ~4.4. Pt. with likely ATN. Pt. also underwent vascular study/left leg angiogram on 3/8. Scr is stable at 4.34 today. Pt. at increased risk for radiocontrast nephropathy. Monitor BMP and urine output. Avoid any potential nephrotoxins. Pt. with JANY on CKD in the setting of infection and diarrhea. CKD in the setting of long-standing DM. Scr was 1.5 in 3/2017, increased to 2.30 in 7/2017. Scr on admission (2/12) was elevated at 3.81, increased to 6.3 on 2/23 and has now improved, remaining stable ~4.4. Pt. with likely ATN. Pt. also underwent vascular study/left leg angiogram on 3/8. Scr is stable at 4.34 today. Pt. at increased risk for radiocontrast nephropathy. If undergoing any further studies that involve use of IV contrast, would recommend to give IV NS @75 cc/ hr 6 hours prior, during and 6 hours after the procedure. Monitor BMP and urine output. Avoid any potential nephrotoxins.

## 2018-03-15 NOTE — PROGRESS NOTE ADULT - SUBJECTIVE AND OBJECTIVE BOX
[facility] DIVISION OF KIDNEY DISEASES AND HYPERTENSION -- FOLLOW UP NOTE  --------------------------------------------------------------------------------    HPI: 63-year-old male with PMH of HTN, DM, right BKA, and CKD admitted for left foot infection. As per review of labs on [place]/[employer], Scr was 1.51 in 3/2017. As per PMD's office, Scr checked in 7/2017 was 2.30. Labs on admission (2/12) showed elevated Scr of 3.8 which peaked to 6.3 on 2/23 and is stable at 4.44 today. Pt. underwent vascular study/left leg angiogram on 3/8. Patient seen and examined today. Planned for OR today. Pt feels well and has no complaints. Pt. denies SOB, CP or N/V.     PAST HISTORY  --------------------------------------------------------------------------------  No significant changes to PMH, PSH, FHx, SHx, unless otherwise noted    ALLERGIES & MEDICATIONS  --------------------------------------------------------------------------------  Allergies    No Known Allergies    Intolerances      Standing Inpatient Medications  aspirin enteric coated 81 milliGRAM(s) Oral daily  atorvastatin 80 milliGRAM(s) Oral at bedtime  cyanocobalamin 1000 MICROGram(s) Oral daily  dextrose 5%. 1000 milliLiter(s) IV Continuous <Continuous>  dextrose 50% Injectable 12.5 Gram(s) IV Push once  dextrose 50% Injectable 25 Gram(s) IV Push once  dextrose 50% Injectable 25 Gram(s) IV Push once  furosemide   Injectable 20 milliGRAM(s) IV Push once  heparin  Injectable 5000 Unit(s) SubCutaneous every 8 hours  influenza   Vaccine 0.5 milliLiter(s) IntraMuscular once  insulin lispro (HumaLOG) corrective regimen sliding scale   SubCutaneous three times a day before meals  labetalol 300 milliGRAM(s) Oral three times a day  lactobacillus acidophilus 1 Tablet(s) Oral daily  multivitamin 1 Tablet(s) Oral daily  NIFEdipine XL 60 milliGRAM(s) Oral daily  silver sulfADIAZINE 1% Cream 1 Application(s) Topical daily  sodium bicarbonate 650 milliGRAM(s) Oral every 12 hours    PRN Inpatient Medications  acetaminophen   Tablet 650 milliGRAM(s) Oral every 6 hours PRN  acetaminophen   Tablet. 650 milliGRAM(s) Oral every 6 hours PRN  dextrose Gel 1 Dose(s) Oral once PRN  glucagon  Injectable 1 milliGRAM(s) IntraMuscular once PRN      REVIEW OF SYSTEMS  --------------------------------------------------------------------------------    CVS: no chest pain  RESP: no SOB  ABD: no abdominal pain  : no dysuria  MSK: +Left leg edema/foot infection    All other systems were reviewed and are negative, except as noted.    VITALS/PHYSICAL EXAM  --------------------------------------------------------------------------------  T(C): 36.7 (03-15-18 @ 12:20), Max: 37.1 (03-14-18 @ 15:00)  HR: 56 (03-15-18 @ 12:20) (54 - 68)  BP: 146/61 (03-15-18 @ 12:20) (126/65 - 157/61)  RR: 18 (03-15-18 @ 12:20) (17 - 18)  SpO2: 100% (03-15-18 @ 12:20) (90% - 100%)  Wt(kg): --  Height (cm): 167.64 (03-15-18 @ 09:00)  Weight (kg): 75.4 (03-15-18 @ 09:00)  BMI (kg/m2): 26.8 (03-15-18 @ 09:00)  BSA (m2): 1.85 (03-15-18 @ 09:00)      03-15-18 @ 07:01  -  03-15-18 @ 12:50  --------------------------------------------------------  IN: 0 mL / OUT: 250 mL / NET: -250 mL    Physical Exam:  	Gen: Resting in bed   	HEENT: No JVD  	Pulm: CTA B/L  	CV: S1S2+  	Abd: soft  	LE: Right BKA, LLE edema present, left foot dressing seen   	Neuro: Awake and alert   	Psych: Normal affect and mood  	Skin: Warm    LABS/STUDIES  --------------------------------------------------------------------------------              10.2   4.46  >-----------<  241      [03-15-18 @ 07:12]              32.1     141  |  103  |  61  ----------------------------<  74      [03-15-18 @ 07:12]  4.7   |  21  |  4.34        Ca     8.7     [03-15-18 @ 07:12]      Mg     1.9     [03-15-18 @ 07:12]      Phos  5.3     [03-15-18 @ 07:12]      PT/INR: PT 13.2 , INR 1.14       [03-15-18 @ 07:12]  PTT: 35.0       [03-15-18 @ 07:12]      Creatinine Trend:  SCr 4.34 [03-15 @ 07:12]  SCr 4.40 [03-14 @ 06:15]  SCr 4.44 [03-13 @ 06:30]  SCr 4.48 [03-12 @ 05:13]  SCr 4.48 [03-11 @ 06:45]    Urinalysis - [02-25-18 @ 04:50]      Color PLYEL / Appearance CLEAR / SG 1.015 / pH 5.5      Gluc NEGATIVE / Ketone NEGATIVE  / Bili NEGATIVE / Urobili NORMAL       Blood TRACE / Protein 100 / Leuk Est TRACE / Nitrite NEGATIVE      RBC 0-2 / WBC 2-5 / Hyaline  / Gran  / Sq Epi OCC / Non Sq Epi  / Bacteria       Iron 21, TIBC 163, %sat --      [02-20-18 @ 05:00]  Ferritin 540.7      [02-20-18 @ 05:00]  HbA1c 5.9      [02-13-18 @ 07:11]  TSH 3.22      [03-08-18 @ 06:00]      Free Light Chains: kappa 20.50, lambda 11.20, ratio = 1.83 H      [02-25 @ 07:43]

## 2018-03-15 NOTE — PROGRESS NOTE ADULT - SUBJECTIVE AND OBJECTIVE BOX
Patient is 63 year old male admitted for left lower extremity celullitis dm, ckd, s/p r bka, found to have cri superimposed with acute tubular necrosis and fluid overload s/p echo with tico lv function and some dyastolic dysfunction  , high anion gap met acidosis scheduled to have left lower extremity revascularization s/p yesterday Blood Transfusion x2 , bilateral pleural effusion on ASU patient vitals sign sat 94% breathing at 24. taken to the or, ASA monitor applied, o2 sat 89 90, o2 mask applied found to be breathing at rate of 29/34 bpm, with tidal vol;umen of low 200 , reviewed of the CXR showed worsening of fluid overload, decreased bilateral breath sound occasional roncus and increased ausculatation of the voice called senior and John attending in charge of pacvu review the chart patient should be optimized fluid overload, recommend by 2 senior and one John anesthesiologist attendings  patient should be dyalized if this is not a life threatining case to improve oxygenation and increased FRC  discussed with Dr Crump and recommend canceling the case , high possibility of p[rolonged intubation with tracheostomy   pATIENT SHOULD BE OPTIMIZED FROM FLUID STATUS   PLASE SEE ANESTHESIA RECORD FOR VITAL SIGNS

## 2018-03-16 LAB
BUN SERPL-MCNC: 54 MG/DL — HIGH (ref 7–23)
BUN SERPL-MCNC: 56 MG/DL — HIGH (ref 7–23)
CALCIUM SERPL-MCNC: 8.5 MG/DL — SIGNIFICANT CHANGE UP (ref 8.4–10.5)
CALCIUM SERPL-MCNC: 8.6 MG/DL — SIGNIFICANT CHANGE UP (ref 8.4–10.5)
CHLORIDE SERPL-SCNC: 103 MMOL/L — SIGNIFICANT CHANGE UP (ref 98–107)
CHLORIDE SERPL-SCNC: 108 MMOL/L — HIGH (ref 98–107)
CO2 SERPL-SCNC: 23 MMOL/L — SIGNIFICANT CHANGE UP (ref 22–31)
CO2 SERPL-SCNC: 25 MMOL/L — SIGNIFICANT CHANGE UP (ref 22–31)
CREAT SERPL-MCNC: 4.15 MG/DL — HIGH (ref 0.5–1.3)
CREAT SERPL-MCNC: 4.17 MG/DL — HIGH (ref 0.5–1.3)
GLUCOSE BLDC GLUCOMTR-MCNC: 81 MG/DL — SIGNIFICANT CHANGE UP (ref 70–99)
GLUCOSE BLDC GLUCOMTR-MCNC: 94 MG/DL — SIGNIFICANT CHANGE UP (ref 70–99)
GLUCOSE BLDC GLUCOMTR-MCNC: 98 MG/DL — SIGNIFICANT CHANGE UP (ref 70–99)
GLUCOSE BLDC GLUCOMTR-MCNC: 99 MG/DL — SIGNIFICANT CHANGE UP (ref 70–99)
GLUCOSE SERPL-MCNC: 77 MG/DL — SIGNIFICANT CHANGE UP (ref 70–99)
GLUCOSE SERPL-MCNC: 95 MG/DL — SIGNIFICANT CHANGE UP (ref 70–99)
HCT VFR BLD CALC: 30.6 % — LOW (ref 39–50)
HGB BLD-MCNC: 9.8 G/DL — LOW (ref 13–17)
MAGNESIUM SERPL-MCNC: 1.9 MG/DL — SIGNIFICANT CHANGE UP (ref 1.6–2.6)
MAGNESIUM SERPL-MCNC: 1.9 MG/DL — SIGNIFICANT CHANGE UP (ref 1.6–2.6)
MCHC RBC-ENTMCNC: 26.8 PG — LOW (ref 27–34)
MCHC RBC-ENTMCNC: 32 % — SIGNIFICANT CHANGE UP (ref 32–36)
MCV RBC AUTO: 83.8 FL — SIGNIFICANT CHANGE UP (ref 80–100)
NRBC # FLD: 0 — SIGNIFICANT CHANGE UP
PHOSPHATE SERPL-MCNC: 5 MG/DL — HIGH (ref 2.5–4.5)
PHOSPHATE SERPL-MCNC: 5.1 MG/DL — HIGH (ref 2.5–4.5)
PLATELET # BLD AUTO: 235 K/UL — SIGNIFICANT CHANGE UP (ref 150–400)
PMV BLD: 11.4 FL — SIGNIFICANT CHANGE UP (ref 7–13)
POTASSIUM SERPL-MCNC: 4.6 MMOL/L — SIGNIFICANT CHANGE UP (ref 3.5–5.3)
POTASSIUM SERPL-MCNC: 4.7 MMOL/L — SIGNIFICANT CHANGE UP (ref 3.5–5.3)
POTASSIUM SERPL-SCNC: 4.6 MMOL/L — SIGNIFICANT CHANGE UP (ref 3.5–5.3)
POTASSIUM SERPL-SCNC: 4.7 MMOL/L — SIGNIFICANT CHANGE UP (ref 3.5–5.3)
RBC # BLD: 3.65 M/UL — LOW (ref 4.2–5.8)
RBC # FLD: 14.9 % — HIGH (ref 10.3–14.5)
SODIUM SERPL-SCNC: 142 MMOL/L — SIGNIFICANT CHANGE UP (ref 135–145)
SODIUM SERPL-SCNC: 147 MMOL/L — HIGH (ref 135–145)
WBC # BLD: 4.28 K/UL — SIGNIFICANT CHANGE UP (ref 3.8–10.5)
WBC # FLD AUTO: 4.28 K/UL — SIGNIFICANT CHANGE UP (ref 3.8–10.5)

## 2018-03-16 PROCEDURE — 99233 SBSQ HOSP IP/OBS HIGH 50: CPT | Mod: GC

## 2018-03-16 PROCEDURE — 99232 SBSQ HOSP IP/OBS MODERATE 35: CPT

## 2018-03-16 RX ORDER — INSULIN LISPRO 100/ML
VIAL (ML) SUBCUTANEOUS
Refills: 0 | Status: DISCONTINUED | OUTPATIENT
Start: 2018-03-16 | End: 2018-04-01

## 2018-03-16 RX ORDER — FUROSEMIDE 40 MG
40 TABLET ORAL
Refills: 0 | Status: DISCONTINUED | OUTPATIENT
Start: 2018-03-16 | End: 2018-03-18

## 2018-03-16 RX ORDER — PREGABALIN 225 MG/1
1000 CAPSULE ORAL DAILY
Refills: 0 | Status: DISCONTINUED | OUTPATIENT
Start: 2018-03-16 | End: 2018-04-13

## 2018-03-16 RX ORDER — FUROSEMIDE 40 MG
40 TABLET ORAL
Refills: 0 | Status: DISCONTINUED | OUTPATIENT
Start: 2018-03-16 | End: 2018-03-16

## 2018-03-16 RX ORDER — SODIUM CHLORIDE 9 MG/ML
1000 INJECTION, SOLUTION INTRAVENOUS
Refills: 0 | Status: DISCONTINUED | OUTPATIENT
Start: 2018-03-16 | End: 2018-03-30

## 2018-03-16 RX ORDER — NIFEDIPINE 30 MG
60 TABLET, EXTENDED RELEASE 24 HR ORAL DAILY
Refills: 0 | Status: DISCONTINUED | OUTPATIENT
Start: 2018-03-16 | End: 2018-03-19

## 2018-03-16 RX ORDER — FUROSEMIDE 40 MG
40 TABLET ORAL ONCE
Refills: 0 | Status: COMPLETED | OUTPATIENT
Start: 2018-03-16 | End: 2018-03-16

## 2018-03-16 RX ORDER — ACETAMINOPHEN 500 MG
650 TABLET ORAL EVERY 6 HOURS
Refills: 0 | Status: DISCONTINUED | OUTPATIENT
Start: 2018-03-16 | End: 2018-04-13

## 2018-03-16 RX ORDER — DEXTROSE 50 % IN WATER 50 %
1 SYRINGE (ML) INTRAVENOUS ONCE
Refills: 0 | Status: DISCONTINUED | OUTPATIENT
Start: 2018-03-16 | End: 2018-04-13

## 2018-03-16 RX ORDER — GLUCAGON INJECTION, SOLUTION 0.5 MG/.1ML
1 INJECTION, SOLUTION SUBCUTANEOUS ONCE
Refills: 0 | Status: DISCONTINUED | OUTPATIENT
Start: 2018-03-16 | End: 2018-04-13

## 2018-03-16 RX ORDER — LABETALOL HCL 100 MG
200 TABLET ORAL THREE TIMES A DAY
Refills: 0 | Status: DISCONTINUED | OUTPATIENT
Start: 2018-03-16 | End: 2018-04-13

## 2018-03-16 RX ADMIN — Medication 1 TABLET(S): at 11:51

## 2018-03-16 RX ADMIN — Medication 200 MILLIGRAM(S): at 16:27

## 2018-03-16 RX ADMIN — Medication 40 MILLIGRAM(S): at 17:57

## 2018-03-16 RX ADMIN — HEPARIN SODIUM 5000 UNIT(S): 5000 INJECTION INTRAVENOUS; SUBCUTANEOUS at 16:27

## 2018-03-16 RX ADMIN — Medication 60 MILLIGRAM(S): at 11:51

## 2018-03-16 RX ADMIN — Medication 40 MILLIGRAM(S): at 17:42

## 2018-03-16 RX ADMIN — Medication 650 MILLIGRAM(S): at 05:45

## 2018-03-16 RX ADMIN — Medication 650 MILLIGRAM(S): at 17:58

## 2018-03-16 RX ADMIN — PREGABALIN 1000 MICROGRAM(S): 225 CAPSULE ORAL at 11:51

## 2018-03-16 RX ADMIN — HEPARIN SODIUM 5000 UNIT(S): 5000 INJECTION INTRAVENOUS; SUBCUTANEOUS at 05:45

## 2018-03-16 RX ADMIN — Medication 1 APPLICATION(S): at 11:51

## 2018-03-16 RX ADMIN — Medication 200 MILLIGRAM(S): at 21:50

## 2018-03-16 RX ADMIN — ATORVASTATIN CALCIUM 80 MILLIGRAM(S): 80 TABLET, FILM COATED ORAL at 21:50

## 2018-03-16 RX ADMIN — Medication 81 MILLIGRAM(S): at 11:51

## 2018-03-16 RX ADMIN — HEPARIN SODIUM 5000 UNIT(S): 5000 INJECTION INTRAVENOUS; SUBCUTANEOUS at 21:50

## 2018-03-16 NOTE — PROGRESS NOTE ADULT - SUBJECTIVE AND OBJECTIVE BOX
[facility] DIVISION OF KIDNEY DISEASES AND HYPERTENSION -- FOLLOW UP NOTE  --------------------------------------------------------------------------------    HPI: 63-year-old male with PMH of HTN, DM, right BKA, and CKD admitted for left foot infection. As per review of labs on [employer], Scr was 1.51 in 3/2017. As per PMD's office, Scr checked in 7/2017 was 2.30. Labs on admission (2/12) showed elevated Scr of 3.8 which peaked to 6.3 on 2/23 and is stable at 4.44 today. Pt. underwent vascular study/left leg angiogram on 3/8. Patient seen and examined today. Pt experienced SOB overnight which has now resolved with IV diuretic therapy. Pt feels well and has no complaints. Comfortable off nasal cannula at bedside. Pt. denies SOB, CP or N/V.     PAST HISTORY  --------------------------------------------------------------------------------  No significant changes to PMH, PSH, FHx, SHx, unless otherwise noted    ALLERGIES & MEDICATIONS  --------------------------------------------------------------------------------  Allergies    No Known Allergies    Intolerances      Standing Inpatient Medications  aspirin enteric coated 81 milliGRAM(s) Oral daily  atorvastatin 80 milliGRAM(s) Oral at bedtime  cyanocobalamin 1000 MICROGram(s) Oral daily  dextrose 5%. 1000 milliLiter(s) IV Continuous <Continuous>  dextrose 50% Injectable 25 Gram(s) IV Push once  dextrose 50% Injectable 25 Gram(s) IV Push once  furosemide   Injectable 20 milliGRAM(s) IV Push once  furosemide   Injectable 40 milliGRAM(s) IV Push two times a day  heparin  Injectable 5000 Unit(s) SubCutaneous every 8 hours  influenza   Vaccine 0.5 milliLiter(s) IntraMuscular once  insulin lispro (HumaLOG) corrective regimen sliding scale   SubCutaneous three times a day before meals  labetalol 300 milliGRAM(s) Oral three times a day  multivitamin 1 Tablet(s) Oral daily  NIFEdipine XL 60 milliGRAM(s) Oral daily  silver sulfADIAZINE 1% Cream 1 Application(s) Topical daily  sodium bicarbonate 650 milliGRAM(s) Oral every 12 hours    PRN Inpatient Medications  acetaminophen   Tablet. 650 milliGRAM(s) Oral every 6 hours PRN  dextrose Gel 1 Dose(s) Oral once PRN  glucagon  Injectable 1 milliGRAM(s) IntraMuscular once PRN      REVIEW OF SYSTEMS  --------------------------------------------------------------------------------  CVS: no chest pain  RESP: no SOB  ABD: no abdominal pain  : no dysuria  MSK: +Left leg edema/foot infection    All other systems were reviewed and are negative, except as noted.    VITALS/PHYSICAL EXAM  --------------------------------------------------------------------------------  T(C): 36.7 (03-16-18 @ 05:07), Max: 36.7 (03-15-18 @ 12:20)  HR: 59 (03-16-18 @ 05:07) (56 - 62)  BP: 147/63 (03-16-18 @ 05:07) (144/60 - 147/63)  RR: 18 (03-16-18 @ 05:07) (16 - 18)  SpO2: 94% (03-16-18 @ 05:07) (94% - 100%)  Wt(kg): --  Height (cm): 167.64 (03-15-18 @ 09:00)  Weight (kg): 75.4 (03-15-18 @ 09:00)  BMI (kg/m2): 26.8 (03-15-18 @ 09:00)  BSA (m2): 1.85 (03-15-18 @ 09:00)      03-15-18 @ 07:01  -  03-16-18 @ 07:00  --------------------------------------------------------  IN: 300 mL / OUT: 550 mL / NET: -250 mL  Physical Exam:  	Gen: Resting in bed   	HEENT: No JVD  	Pulm: + rales B/L  	CV: S1S2+  	Abd: soft  	LE: Right BKA, LLE edema present, left foot dressing seen   	Neuro: Awake and alert   	Psych: Normal affect and mood  	Skin: Warm    LABS/STUDIES  --------------------------------------------------------------------------------              9.8    4.28  >-----------<  235      [03-16-18 @ 06:30]              30.6     147  |  108  |  56  ----------------------------<  77      [03-16-18 @ 06:30]  4.6   |  25  |  4.15        Ca     8.5     [03-16-18 @ 06:30]      Mg     1.9     [03-16-18 @ 06:30]      Phos  5.0     [03-16-18 @ 06:30]      PT/INR: PT 13.2 , INR 1.14       [03-15-18 @ 07:12]  PTT: 35.0       [03-15-18 @ 07:12]    Creatinine Trend:  SCr 4.15 [03-16 @ 06:30]  SCr 4.34 [03-15 @ 07:12]  SCr 4.40 [03-14 @ 06:15]  SCr 4.44 [03-13 @ 06:30]  SCr 4.48 [03-12 @ 05:13]    Urinalysis - [02-25-18 @ 04:50]      Color PLYEL / Appearance CLEAR / SG 1.015 / pH 5.5      Gluc NEGATIVE / Ketone NEGATIVE  / Bili NEGATIVE / Urobili NORMAL       Blood TRACE / Protein 100 / Leuk Est TRACE / Nitrite NEGATIVE      RBC 0-2 / WBC 2-5 / Hyaline  / Gran  / Sq Epi OCC / Non Sq Epi  / Bacteria       Iron 21, TIBC 163, %sat --      [02-20-18 @ 05:00]  Ferritin 540.7      [02-20-18 @ 05:00]  HbA1c 5.9      [02-13-18 @ 07:11]  TSH 3.22      [03-08-18 @ 06:00]      Free Light Chains: kappa 20.50, lambda 11.20, ratio = 1.83 H      [02-25 @ 07:43]

## 2018-03-16 NOTE — PROGRESS NOTE ADULT - PROBLEM SELECTOR PLAN 1
Pt. with JANY on CKD in the setting of infection and diarrhea. CKD in the setting of long-standing DM. Scr was 1.5 in 3/2017, increased to 2.30 in 7/2017. Scr on admission (2/12) was elevated at 3.81, increased to 6.3 on 2/23 and has now improved, remaining stable ~4.4. Pt. with likely ATN. Pt. also underwent vascular study/left leg angiogram on 3/8. Scr is stable at 4.1 today. Pt. at increased risk for radiocontrast nephropathy. Monitor BMP and urine output. Avoid any potential nephrotoxins.

## 2018-03-16 NOTE — PROGRESS NOTE ADULT - SUBJECTIVE AND OBJECTIVE BOX
Patient is a 63y old  Male who presents with a chief complaint of 63M PMH DM, HTN, CKD p/w L foot pain x 3 days. (19 Feb 2018 10:39)      SUBJECTIVE / OVERNIGHT EVENTS:    MEDICATIONS  (STANDING):  aspirin enteric coated 81 milliGRAM(s) Oral daily  atorvastatin 80 milliGRAM(s) Oral at bedtime  cyanocobalamin 1000 MICROGram(s) Oral daily  dextrose 5%. 1000 milliLiter(s) (50 mL/Hr) IV Continuous <Continuous>  dextrose 50% Injectable 12.5 Gram(s) IV Push once  dextrose 50% Injectable 25 Gram(s) IV Push once  dextrose 50% Injectable 25 Gram(s) IV Push once  furosemide   Injectable 20 milliGRAM(s) IV Push once  heparin  Injectable 5000 Unit(s) SubCutaneous every 8 hours  influenza   Vaccine 0.5 milliLiter(s) IntraMuscular once  insulin lispro (HumaLOG) corrective regimen sliding scale   SubCutaneous three times a day before meals  labetalol 300 milliGRAM(s) Oral three times a day  lactobacillus acidophilus 1 Tablet(s) Oral daily  multivitamin 1 Tablet(s) Oral daily  NIFEdipine XL 60 milliGRAM(s) Oral daily  silver sulfADIAZINE 1% Cream 1 Application(s) Topical daily  sodium bicarbonate 650 milliGRAM(s) Oral every 12 hours    MEDICATIONS  (PRN):  acetaminophen   Tablet 650 milliGRAM(s) Oral every 6 hours PRN For Temp greater than 38 C (100.4 F)  acetaminophen   Tablet. 650 milliGRAM(s) Oral every 6 hours PRN Moderate Pain (4 - 6)  dextrose Gel 1 Dose(s) Oral once PRN Blood Glucose LESS THAN 70 milliGRAM(s)/deciliter  glucagon  Injectable 1 milliGRAM(s) IntraMuscular once PRN Glucose LESS THAN 70 milligrams/deciliter        CAPILLARY BLOOD GLUCOSE      POCT Blood Glucose.: 172 mg/dL (15 Mar 2018 21:23)  POCT Blood Glucose.: 88 mg/dL (15 Mar 2018 17:39)  POCT Blood Glucose.: 99 mg/dL (15 Mar 2018 12:11)  POCT Blood Glucose.: 103 mg/dL (15 Mar 2018 09:33)  POCT Blood Glucose.: 86 mg/dL (15 Mar 2018 08:46)  POCT Blood Glucose.: 69 mg/dL (15 Mar 2018 08:31)    I&O's Summary    15 Mar 2018 07:01  -  16 Mar 2018 06:58  --------------------------------------------------------  IN: 300 mL / OUT: 550 mL / NET: -250 mL        PHYSICAL EXAM:  GENERAL: NAD, well-developed  HEAD:  Atraumatic, Normocephalic  EYES: EOMI, PERRLA, conjunctiva and sclera clear  NECK: Supple, No JVD  CHEST/LUNG: Clear to auscultation bilaterally; No wheeze  HEART: Regular rate and rhythm; No murmurs, rubs, or gallops  ABDOMEN: Soft, Nontender, Nondistended; Bowel sounds present  EXTREMITIES:  2+ Peripheral Pulses, No clubbing, cyanosis, or edema  PSYCH: AAOx3  NEUROLOGY: non-focal  SKIN: No rashes or lesions    LABS:  (03-15 @ 07:12)                        10.2  4.46 )-----------( 241                 32.1    Neutrophils = 2.59 (58.1%)  Lymphocytes = 0.90 (20.2%)  Eosinophils = 0.31 (7.0%)  Basophils = 0.06 (1.3%)  Monocytes = 0.59 (13.2%)  Bands = --%    WBC Trend: 4.46<--, 4.05<--, 4.14<--  Hb Trend: 10.2<--, 9.1<--, 7.4<--, 7.5<--, 7.8<--  Plt Trend: 241<--, 239<--, 260<--, 237<--, 261<--  03-15    141  |  103  |  61<H>  ----------------------------<  74  4.7   |  21<L>  |  4.34<H>    Ca    8.7      15 Mar 2018 07:12  Phos  5.3     03-15  Mg     1.9     03-15      Creatinine Trend: 4.34<--, 4.40<--, 4.44<--, 4.48<--, 4.48<--, 4.45<--  ( 15 Mar 2018 07:12 )   PT: 13.2 SEC;   INR: 1.14 ;       PTT:35.0 SEC          Microbiology:  Urine Cx:  Blood Cx:    RADIOLOGY & ADDITIONAL TESTS:  X- Ray:  CT:  Ultrasound:  [ ] imaging personally reviewed and interpreted by me    Consultant(s) Notes Reviewed:      Care Discussed with Consultants/Other Providers: Patient is a 63y old  Male who presents with a chief complaint of 63M PMH DM, HTN, CKD p/w L foot pain x 3 days. (19 Feb 2018 10:39)      SUBJECTIVE / OVERNIGHT EVENTS: Patient's surgery cancelled given that he was saturating 89-95% and tachypneic 29-34. Evaluated by nephrology, patient declining HD unless it was a life-threatening emergency. This AM patient saturating appropriately on 2L NC. Output 550cc.     MEDICATIONS  (STANDING):  aspirin enteric coated 81 milliGRAM(s) Oral daily  atorvastatin 80 milliGRAM(s) Oral at bedtime  cyanocobalamin 1000 MICROGram(s) Oral daily  dextrose 5%. 1000 milliLiter(s) (50 mL/Hr) IV Continuous <Continuous>  dextrose 50% Injectable 12.5 Gram(s) IV Push once  dextrose 50% Injectable 25 Gram(s) IV Push once  dextrose 50% Injectable 25 Gram(s) IV Push once  furosemide   Injectable 20 milliGRAM(s) IV Push once  heparin  Injectable 5000 Unit(s) SubCutaneous every 8 hours  influenza   Vaccine 0.5 milliLiter(s) IntraMuscular once  insulin lispro (HumaLOG) corrective regimen sliding scale   SubCutaneous three times a day before meals  labetalol 300 milliGRAM(s) Oral three times a day  lactobacillus acidophilus 1 Tablet(s) Oral daily  multivitamin 1 Tablet(s) Oral daily  NIFEdipine XL 60 milliGRAM(s) Oral daily  silver sulfADIAZINE 1% Cream 1 Application(s) Topical daily  sodium bicarbonate 650 milliGRAM(s) Oral every 12 hours    MEDICATIONS  (PRN):  acetaminophen   Tablet 650 milliGRAM(s) Oral every 6 hours PRN For Temp greater than 38 C (100.4 F)  acetaminophen   Tablet. 650 milliGRAM(s) Oral every 6 hours PRN Moderate Pain (4 - 6)  dextrose Gel 1 Dose(s) Oral once PRN Blood Glucose LESS THAN 70 milliGRAM(s)/deciliter  glucagon  Injectable 1 milliGRAM(s) IntraMuscular once PRN Glucose LESS THAN 70 milligrams/deciliter        CAPILLARY BLOOD GLUCOSE      POCT Blood Glucose.: 172 mg/dL (15 Mar 2018 21:23)  POCT Blood Glucose.: 88 mg/dL (15 Mar 2018 17:39)  POCT Blood Glucose.: 99 mg/dL (15 Mar 2018 12:11)  POCT Blood Glucose.: 103 mg/dL (15 Mar 2018 09:33)  POCT Blood Glucose.: 86 mg/dL (15 Mar 2018 08:46)  POCT Blood Glucose.: 69 mg/dL (15 Mar 2018 08:31)    I&O's Summary    15 Mar 2018 07:01  -  16 Mar 2018 06:58  --------------------------------------------------------  IN: 300 mL / OUT: 550 mL / NET: -250 mL        PHYSICAL EXAM:  GENERAL: elderly gentleman no acute distress on NC  HEAD:  Atraumatic, Normocephalic  EYES: EOMI, PERRLA,   NECK: Supple, No JVD  CHEST/LUNG: Bibasilar rales; not clearing with coughing.   HEART: Regular rate and rhythm; No murmurs, rubs, or gallops  ABDOMEN: Soft, Nontender, Nondistended; Bowel sounds present  EXTREMITIES:  2+ Peripheral Pulses, s/p R BKA 1+ pitting edema LLE; unable to palpate DP. Foot dressed; c/d/i   PSYCH: AAOx3  NEUROLOGY: non-focal  SKIN: No rashes or lesions    LABS:  (03-15 @ 07:12)                        10.2  4.46 )-----------( 241                 32.1    Neutrophils = 2.59 (58.1%)  Lymphocytes = 0.90 (20.2%)  Eosinophils = 0.31 (7.0%)  Basophils = 0.06 (1.3%)  Monocytes = 0.59 (13.2%)  Bands = --%    WBC Trend: 4.46<--, 4.05<--, 4.14<--  Hb Trend: 10.2<--, 9.1<--, 7.4<--, 7.5<--, 7.8<--  Plt Trend: 241<--, 239<--, 260<--, 237<--, 261<--  03-15    141  |  103  |  61<H>  ----------------------------<  74  4.7   |  21<L>  |  4.34<H>    Ca    8.7      15 Mar 2018 07:12  Phos  5.3     03-15  Mg     1.9     03-15      Creatinine Trend: 4.34<--, 4.40<--, 4.44<--, 4.48<--, 4.48<--, 4.45<--  ( 15 Mar 2018 07:12 )   PT: 13.2 SEC;   INR: 1.14 ;       PTT:35.0 SEC Patient is a 63y old  Male who presents with a chief complaint of 63M PMH DM, HTN, CKD p/w L foot pain x 3 days. (19 Feb 2018 10:39)      SUBJECTIVE / OVERNIGHT EVENTS: Patient's surgery cancelled given that he was saturating 89-95% and tachypneic 29-34. Evaluated by nephrology, patient declining HD unless it was a life-threatening emergency. This AM patient saturating appropriately on 2L NC. Output 550cc.     MEDICATIONS  (STANDING):  aspirin enteric coated 81 milliGRAM(s) Oral daily  atorvastatin 80 milliGRAM(s) Oral at bedtime  cyanocobalamin 1000 MICROGram(s) Oral daily  dextrose 5%. 1000 milliLiter(s) (50 mL/Hr) IV Continuous <Continuous>  dextrose 50% Injectable 12.5 Gram(s) IV Push once  dextrose 50% Injectable 25 Gram(s) IV Push once  dextrose 50% Injectable 25 Gram(s) IV Push once  furosemide   Injectable 20 milliGRAM(s) IV Push once  heparin  Injectable 5000 Unit(s) SubCutaneous every 8 hours  influenza   Vaccine 0.5 milliLiter(s) IntraMuscular once  insulin lispro (HumaLOG) corrective regimen sliding scale   SubCutaneous three times a day before meals  labetalol 300 milliGRAM(s) Oral three times a day  lactobacillus acidophilus 1 Tablet(s) Oral daily  multivitamin 1 Tablet(s) Oral daily  NIFEdipine XL 60 milliGRAM(s) Oral daily  silver sulfADIAZINE 1% Cream 1 Application(s) Topical daily  sodium bicarbonate 650 milliGRAM(s) Oral every 12 hours    MEDICATIONS  (PRN):  acetaminophen   Tablet 650 milliGRAM(s) Oral every 6 hours PRN For Temp greater than 38 C (100.4 F)  acetaminophen   Tablet. 650 milliGRAM(s) Oral every 6 hours PRN Moderate Pain (4 - 6)  dextrose Gel 1 Dose(s) Oral once PRN Blood Glucose LESS THAN 70 milliGRAM(s)/deciliter  glucagon  Injectable 1 milliGRAM(s) IntraMuscular once PRN Glucose LESS THAN 70 milligrams/deciliter        CAPILLARY BLOOD GLUCOSE      POCT Blood Glucose.: 172 mg/dL (15 Mar 2018 21:23)  POCT Blood Glucose.: 88 mg/dL (15 Mar 2018 17:39)  POCT Blood Glucose.: 99 mg/dL (15 Mar 2018 12:11)  POCT Blood Glucose.: 103 mg/dL (15 Mar 2018 09:33)  POCT Blood Glucose.: 86 mg/dL (15 Mar 2018 08:46)  POCT Blood Glucose.: 69 mg/dL (15 Mar 2018 08:31)    I&O's Summary    15 Mar 2018 07:01  -  16 Mar 2018 06:58  --------------------------------------------------------  IN: 300 mL / OUT: 550 mL / NET: -250 mL        PHYSICAL EXAM:  GENERAL: elderly gentleman no acute distress on NC  HEAD:  Atraumatic, Normocephalic  EYES: EOMI, PERRLA,   NECK: Supple, No JVD  CHEST/LUNG: Bibasilar rales; not clearing with coughing.   HEART: Regular rate and rhythm; No murmurs, rubs, or gallops  ABDOMEN: Soft, Nontender, Nondistended; Bowel sounds present  EXTREMITIES:  2+ Peripheral Pulses, s/p R BKA 1+ pitting edema LLE; unable to palpate DP. Foot dressed; c/d/i   PSYCH: AAOx3  NEUROLOGY: non-focal  SKIN: No rashes or lesions    LABS:  (03-15 @ 07:12)                        10.2  4.46 )-----------( 241                 32.1    Neutrophils = 2.59 (58.1%)  Lymphocytes = 0.90 (20.2%)  Eosinophils = 0.31 (7.0%)  Basophils = 0.06 (1.3%)  Monocytes = 0.59 (13.2%)  Bands = --%    WBC Trend: 4.46<--, 4.05<--, 4.14<--  Hb Trend: 10.2<--, 9.1<--, 7.4<--, 7.5<--, 7.8<--  Plt Trend: 241<--, 239<--, 260<--, 237<--, 261<--  03-15    141  |  103  |  61<H>  ----------------------------<  74  4.7   |  21<L>  |  4.34<H>    Ca    8.7      15 Mar 2018 07:12  Phos  5.3     03-15  Mg     1.9     03-15      Creatinine Trend: 4.34<--, 4.40<--, 4.44<--, 4.48<--, 4.48<--, 4.45<--  ( 15 Mar 2018 07:12 )   PT: 13.2 SEC;   INR: 1.14 ;       PTT:35.0 SEC    RADIOLOGY    Point of Care Ultrasound:

## 2018-03-16 NOTE — PROGRESS NOTE ADULT - PROBLEM SELECTOR PLAN 1
Creatinine remains 4.4. ATN on CKD stage III in the setting of sepsis. 4.4 may be his new baseline creatinine. S/p angiogram.  Continuing to monitor urine outputs, trend sCr daily; patient not oliguric. Creatinine remains 4.4. ATN on CKD stage III in the setting of sepsis. 4.4 may be his new baseline creatinine. S/p angiogram.  Continuing to monitor urine outputs, trend sCr daily; patient mildly oliguric. Creatinine remains 4.1 now stable CKD V in the setting of  ATN from sepsis & LANNY.   Continuing to monitor urine outputs, trend sCr daily; patient mildly oliguric.

## 2018-03-16 NOTE — PROGRESS NOTE ADULT - PROBLEM SELECTOR PLAN 6
s/p 2 units for pre-op optimization. Hgb now 10.2  Folate, B12, TSH all WNL.  Likely 2/2 in setting of decreased EPO production d/t worsening kidney function. Hemolysis less likely. T bili is WNL. Acute blood loss also less likely as no source. s/p 2 units for pre-op optimization. Hgb stable   Folate, B12, TSH all WNL.  Likely 2/2 in setting of decreased EPO production d/t worsening kidney function. Hemolysis less likely. T bili is WNL. Acute blood loss also less likely as no source.

## 2018-03-16 NOTE — PROGRESS NOTE ADULT - ATTENDING COMMENTS
pt seen and examined by me Agree with resident   Hypoxic respiratory failure likely secondary to volume overload- pt recd 2 units PRBC plus 500 cc of IVF 2 days back  pt requiring 2 L oxygen.  Chest exam notable for decreased breath sound on right side and bilateral crackles. Presence of RLE edema     On IV lasix 40 BID IV, Dw Staff- pt with good urine outpt  DW resident- plan for bedside ultrasound to assess  fluid status of lung fields  monitor response to diuretics   Reviewed TTE- mild AS, pul HTN  will obtain repeat Pro BNP   Renal on board- Appreciate recommendations- continue IV Lasix 40 mg BID

## 2018-03-16 NOTE — PROGRESS NOTE ADULT - SUBJECTIVE AND OBJECTIVE BOX
GENERAL SURGERY DAILY PROGRESS NOTE:       Subjective:  Pt had resp distress and tachypnea in OR yesterday due to pulmonary effusions. Renal consulted, pt did not necessitate HD. Pt saturated well overnight. Denies SOB this am.         Objective:    PE:  Gen: Alert, NAD  Ext: LLE w/ kerlex dressing in place; s/p R BKA, healed left toe wound stable w eschar      Vital Signs Last 24 Hrs  T(C): 36.7 (16 Mar 2018 05:07), Max: 36.7 (15 Mar 2018 09:00)  T(F): 98 (16 Mar 2018 05:07), Max: 98.1 (15 Mar 2018 09:00)  HR: 59 (16 Mar 2018 05:07) (56 - 62)  BP: 147/63 (16 Mar 2018 05:07) (144/60 - 147/63)  BP(mean): --  RR: 18 (16 Mar 2018 05:07) (16 - 18)  SpO2: 94% (16 Mar 2018 05:07) (94% - 100%)    I&O's Detail    15 Mar 2018 07:01  -  16 Mar 2018 07:00  --------------------------------------------------------  IN:    Oral Fluid: 300 mL  Total IN: 300 mL    OUT:    Voided: 550 mL  Total OUT: 550 mL    Total NET: -250 mL          Daily Height in cm: 167.64 (15 Mar 2018 09:00)    Daily Weight in k.9 (16 Mar 2018 05:07)    MEDICATIONS  (STANDING):  aspirin enteric coated 81 milliGRAM(s) Oral daily  atorvastatin 80 milliGRAM(s) Oral at bedtime  cyanocobalamin 1000 MICROGram(s) Oral daily  dextrose 5%. 1000 milliLiter(s) (50 mL/Hr) IV Continuous <Continuous>  dextrose 50% Injectable 25 Gram(s) IV Push once  dextrose 50% Injectable 25 Gram(s) IV Push once  furosemide   Injectable 20 milliGRAM(s) IV Push once  furosemide   Injectable 40 milliGRAM(s) IV Push two times a day  heparin  Injectable 5000 Unit(s) SubCutaneous every 8 hours  influenza   Vaccine 0.5 milliLiter(s) IntraMuscular once  insulin lispro (HumaLOG) corrective regimen sliding scale   SubCutaneous three times a day before meals  labetalol 300 milliGRAM(s) Oral three times a day  multivitamin 1 Tablet(s) Oral daily  NIFEdipine XL 60 milliGRAM(s) Oral daily  silver sulfADIAZINE 1% Cream 1 Application(s) Topical daily  sodium bicarbonate 650 milliGRAM(s) Oral every 12 hours    MEDICATIONS  (PRN):  acetaminophen   Tablet. 650 milliGRAM(s) Oral every 6 hours PRN Moderate Pain (4 - 6)  dextrose Gel 1 Dose(s) Oral once PRN Blood Glucose LESS THAN 70 milliGRAM(s)/deciliter  glucagon  Injectable 1 milliGRAM(s) IntraMuscular once PRN Glucose LESS THAN 70 milligrams/deciliter      LABS:                        9.8    4.28  )-----------( 235      ( 16 Mar 2018 06:30 )             30.6     03-16    147<H>  |  108<H>  |  56<H>  ----------------------------<  77  4.6   |  25  |  4.15<H>    Ca    8.5      16 Mar 2018 06:30  Phos  5.0     03-16  Mg     1.9     03-16      PT/INR - ( 15 Mar 2018 07:12 )   PT: 13.2 SEC;   INR: 1.14          PTT - ( 15 Mar 2018 07:12 )  PTT:35.0 SEC GENERAL SURGERY DAILY PROGRESS NOTE:       Subjective:  Pt had resp distress and tachypnea in OR yesterday due to pulmonary effusions. Renal consulted, pt did not necessitate HD. Pt saturated well overnight. Denies SOB this am.     Objective:    PE:  Gen: Alert, NAD  Abd benign   Ext: LLE w/ kerlex dressing in place; s/p R BKA, healed left toe wound stable w eschar      Vital Signs Last 24 Hrs  T(C): 36.7 (16 Mar 2018 05:07), Max: 36.7 (15 Mar 2018 09:00)  T(F): 98 (16 Mar 2018 05:07), Max: 98.1 (15 Mar 2018 09:00)  HR: 59 (16 Mar 2018 05:07) (56 - 62)  BP: 147/63 (16 Mar 2018 05:07) (144/60 - 147/63)  BP(mean): --  RR: 18 (16 Mar 2018 05:07) (16 - 18)  SpO2: 94% (16 Mar 2018 05:07) (94% - 100%)    I&O's Detail    15 Mar 2018 07:01  -  16 Mar 2018 07:00  --------------------------------------------------------  IN:    Oral Fluid: 300 mL  Total IN: 300 mL    OUT:    Voided: 550 mL  Total OUT: 550 mL    Total NET: -250 mL      Daily Height in cm: 167.64 (15 Mar 2018 09:00)    Daily Weight in k.9 (16 Mar 2018 05:07)    MEDICATIONS  (STANDING):  aspirin enteric coated 81 milliGRAM(s) Oral daily  atorvastatin 80 milliGRAM(s) Oral at bedtime  cyanocobalamin 1000 MICROGram(s) Oral daily  dextrose 5%. 1000 milliLiter(s) (50 mL/Hr) IV Continuous <Continuous>  dextrose 50% Injectable 25 Gram(s) IV Push once  dextrose 50% Injectable 25 Gram(s) IV Push once  furosemide   Injectable 20 milliGRAM(s) IV Push once  furosemide   Injectable 40 milliGRAM(s) IV Push two times a day  heparin  Injectable 5000 Unit(s) SubCutaneous every 8 hours  influenza   Vaccine 0.5 milliLiter(s) IntraMuscular once  insulin lispro (HumaLOG) corrective regimen sliding scale   SubCutaneous three times a day before meals  labetalol 300 milliGRAM(s) Oral three times a day  multivitamin 1 Tablet(s) Oral daily  NIFEdipine XL 60 milliGRAM(s) Oral daily  silver sulfADIAZINE 1% Cream 1 Application(s) Topical daily  sodium bicarbonate 650 milliGRAM(s) Oral every 12 hours    MEDICATIONS  (PRN):  acetaminophen   Tablet. 650 milliGRAM(s) Oral every 6 hours PRN Moderate Pain (4 - 6)  dextrose Gel 1 Dose(s) Oral once PRN Blood Glucose LESS THAN 70 milliGRAM(s)/deciliter  glucagon  Injectable 1 milliGRAM(s) IntraMuscular once PRN Glucose LESS THAN 70 milligrams/deciliter      LABS:                        9.8    4.28  )-----------( 235      ( 16 Mar 2018 06:30 )             30.6     03-16    147<H>  |  108<H>  |  56<H>  ----------------------------<  77  4.6   |  25  |  4.15<H>    Ca    8.5      16 Mar 2018 06:30  Phos  5.0     03-16  Mg     1.9     03-16      PT/INR - ( 15 Mar 2018 07:12 )   PT: 13.2 SEC;   INR: 1.14          PTT - ( 15 Mar 2018 07:12 )  PTT:35.0 SEC

## 2018-03-16 NOTE — PROGRESS NOTE ADULT - PROBLEM SELECTOR PLAN 7
c/w Labetalol 300mg TID, Nifedipine 60XL; titrate as needed. -decrease labetalol dose due to mild bradycardia  -c/w Nifedipine XL; will uptitrate if necessary

## 2018-03-16 NOTE — PROGRESS NOTE ADULT - PROBLEM SELECTOR PLAN 3
Acidosis in the setting of renal failure and infection. Serum CO2 stable at 25. Continue with sodium bicarbonate therapy. Monitor serum CO2.

## 2018-03-16 NOTE — PROGRESS NOTE ADULT - PROBLEM SELECTOR PLAN 9
-DVT ppx: HSQ  -Diabetic Diet -DVT ppx: HSQ  -Diabetic Diet    Disposition: possible [facility] to follow for outpatient procedure

## 2018-03-16 NOTE — PROGRESS NOTE ADULT - ASSESSMENT
63M PMHx DM, s/p R BKA, now presenting with Diabetic foot soft tissue infection, w/ angiography showing arterial stenosis of LLE, plan for LLE bypass. Or cancelled due to pulm effusions.     - continue diet  - f/u AM labs  - f/u renal  - f/u CXR  - medically optimize  - give 60mg IV lasix  - SQH, ASA  - Wound care as per podiatry

## 2018-03-16 NOTE — PROGRESS NOTE ADULT - ATTENDING COMMENTS
d/w pt  and renal re the anesthesia service recommendations and that unless he receives  temporary HD  via [facility] for  several sessions I will not be able to perform  lle limb salvage procedure and it can lead to foot and limb loss  Pt states that he needs to think aout it  Advised pt that if he decides to not proceed w  HD access  he can be d/c w toe woudcare (betadine) daily and trental 400 tid  and can f/u as outpt

## 2018-03-16 NOTE — PROGRESS NOTE ADULT - PROBLEM SELECTOR PLAN 4
-S/p angiogram. Scheduled for fem-pop bypass 03/15. Would help with wound healing. Consented via surrogate, Eres (patient's brother) & patient agreeable to surgery.   -L foot with cellulitis with resulting sepsis which is now improved since admission  S/p vancomycin and zosyn. Completed Unasyn on 2/22.  Podiatry has evaluated patient and report good improvement. recs appreciated.    Wound culture grew Acinetobacter, corynebacterium, staph haemolyticus and staph aureus. Blood cx with NGTD.  -LLE xrays negative for free air; CT read negative for free air. -S/p angiogram. Scheduled for fem-pop bypass that has been postponed secondary to suboptimal respiratory status.   -Consented via surrogate, Eres (patient's brother) & patient agreeable to surgery.   -L foot with cellulitis with resulting sepsis which is now improved since admission  S/p vancomycin and zosyn. Completed Unasyn on 2/22.  Podiatry has evaluated patient and report good improvement. recs appreciated.    Wound culture grew Acinetobacter, corynebacterium, staph haemolyticus and staph aureus. Blood cx with NGTD.  -LLE xrays negative for free air; CT read negative for free air.

## 2018-03-16 NOTE — PROGRESS NOTE ADULT - PROBLEM SELECTOR PLAN 2
Likely due to pulmonary edema given acuity and CXR confirming such; possibly due to acute on chronic HFpEF in the setting of JANY.   - Monitor I's and O's & oxygen saturation  - s/p 2 unit pRBC; rales noted on exam; will give 60 Lasix IVP, now saturating 99% on RA. Likely due to pulmonary edema given acuity and CXR confirming such; possibly due to acute on chronic HFpEF in the setting of JANY.   - Monitor I's and O's & oxygen saturation  - s/p 2 unit pRBC; tachypneic & desaturating prior to surgery yesterday  - start Lasix 40 IVP daily and monitor SCr closely to optimize fluid status. Likely due to pulmonary edema given acuity and CXR confirming such; possibly due to acute on chronic HFpEF in the setting of JANY.   - Monitor I's and O's & oxygen saturation  - s/p 2 unit pRBC; tachypneic & desaturating prior to surgery yesterday  - start Lasix 40 IVP BID and monitor SCr closely to optimize fluid status.

## 2018-03-16 NOTE — PROGRESS NOTE ADULT - ATTENDING COMMENTS
Pt. seen and examined. Pt. maintaining UO in response to diuretic therapy. At present, pt. does not appear to have any uremic signs and symptoms. Explained to him about inability for him to lay flat during the scheduled vascular procedure. Discussed the possibility of doing few HD sessions to optimize him for surgery in case his fluid overload does not improve with lasix, pt. at present refusing to undergo HD.

## 2018-03-16 NOTE — PROGRESS NOTE ADULT - PROBLEM SELECTOR PLAN 10
-Patient is high risk for moderate risk procedure. RCRI 3; will medically optimize patient and maintain euvolemia for surgery this afternoon.     Dalton Nunez D.O.  PGY-1 EM/IM  Pager #[phone] -Patient is high risk for moderate risk procedure. RCRI 3; will medically optimize patient and improve volume status for surgery.     Dalton Nunez D.O.  PGY-1 EM/IM  Pager #[phone]

## 2018-03-16 NOTE — PROGRESS NOTE ADULT - PROBLEM SELECTOR PLAN 2
Pt with hypervolemia in the setting of CKD. Pt was acutely SOB yesterday however it was responsive to IV diuretic therapy. Pt would benefit from continued IV diuretic therapy Lasix 40 IV daily as needed. Pt clinically stable today. Labs reviewed. No acute indication for renal replacement therapy at this time. Monitor volume status daily Pt with hypervolemia in the setting of CKD. Pt was acutely SOB yesterday however it was responsive to IV diuretic therapy. Pt would benefit from continued IV diuretic therapy Lasix 40 IV BID. Pt clinically stable today. Labs reviewed. No acute indication for renal replacement therapy at this time. Monitor volume status daily

## 2018-03-17 LAB
BUN SERPL-MCNC: 52 MG/DL — HIGH (ref 7–23)
CALCIUM SERPL-MCNC: 8.3 MG/DL — LOW (ref 8.4–10.5)
CHLORIDE SERPL-SCNC: 103 MMOL/L — SIGNIFICANT CHANGE UP (ref 98–107)
CO2 SERPL-SCNC: 22 MMOL/L — SIGNIFICANT CHANGE UP (ref 22–31)
CREAT SERPL-MCNC: 4.09 MG/DL — HIGH (ref 0.5–1.3)
GLUCOSE BLDC GLUCOMTR-MCNC: 137 MG/DL — HIGH (ref 70–99)
GLUCOSE BLDC GLUCOMTR-MCNC: 140 MG/DL — HIGH (ref 70–99)
GLUCOSE BLDC GLUCOMTR-MCNC: 150 MG/DL — HIGH (ref 70–99)
GLUCOSE BLDC GLUCOMTR-MCNC: 68 MG/DL — LOW (ref 70–99)
GLUCOSE BLDC GLUCOMTR-MCNC: 81 MG/DL — SIGNIFICANT CHANGE UP (ref 70–99)
GLUCOSE BLDC GLUCOMTR-MCNC: 87 MG/DL — SIGNIFICANT CHANGE UP (ref 70–99)
GLUCOSE SERPL-MCNC: 62 MG/DL — LOW (ref 70–99)
HCT VFR BLD CALC: 29.7 % — LOW (ref 39–50)
HGB BLD-MCNC: 9.6 G/DL — LOW (ref 13–17)
MAGNESIUM SERPL-MCNC: 1.9 MG/DL — SIGNIFICANT CHANGE UP (ref 1.6–2.6)
MCHC RBC-ENTMCNC: 27.7 PG — SIGNIFICANT CHANGE UP (ref 27–34)
MCHC RBC-ENTMCNC: 32.3 % — SIGNIFICANT CHANGE UP (ref 32–36)
MCV RBC AUTO: 85.8 FL — SIGNIFICANT CHANGE UP (ref 80–100)
NRBC # FLD: 0 — SIGNIFICANT CHANGE UP
NT-PROBNP SERPL-SCNC: 8301 PG/ML — SIGNIFICANT CHANGE UP
PHOSPHATE SERPL-MCNC: 5 MG/DL — HIGH (ref 2.5–4.5)
PLATELET # BLD AUTO: 238 K/UL — SIGNIFICANT CHANGE UP (ref 150–400)
PMV BLD: 11.4 FL — SIGNIFICANT CHANGE UP (ref 7–13)
POTASSIUM SERPL-MCNC: 4.8 MMOL/L — SIGNIFICANT CHANGE UP (ref 3.5–5.3)
POTASSIUM SERPL-SCNC: 4.8 MMOL/L — SIGNIFICANT CHANGE UP (ref 3.5–5.3)
RBC # BLD: 3.46 M/UL — LOW (ref 4.2–5.8)
RBC # FLD: 14.7 % — HIGH (ref 10.3–14.5)
SODIUM SERPL-SCNC: 141 MMOL/L — SIGNIFICANT CHANGE UP (ref 135–145)
WBC # BLD: 4.13 K/UL — SIGNIFICANT CHANGE UP (ref 3.8–10.5)
WBC # FLD AUTO: 4.13 K/UL — SIGNIFICANT CHANGE UP (ref 3.8–10.5)

## 2018-03-17 PROCEDURE — 99233 SBSQ HOSP IP/OBS HIGH 50: CPT | Mod: GC

## 2018-03-17 PROCEDURE — 71045 X-RAY EXAM CHEST 1 VIEW: CPT | Mod: 26

## 2018-03-17 PROCEDURE — 99233 SBSQ HOSP IP/OBS HIGH 50: CPT

## 2018-03-17 RX ADMIN — HEPARIN SODIUM 5000 UNIT(S): 5000 INJECTION INTRAVENOUS; SUBCUTANEOUS at 23:08

## 2018-03-17 RX ADMIN — ATORVASTATIN CALCIUM 80 MILLIGRAM(S): 80 TABLET, FILM COATED ORAL at 23:08

## 2018-03-17 RX ADMIN — HEPARIN SODIUM 5000 UNIT(S): 5000 INJECTION INTRAVENOUS; SUBCUTANEOUS at 06:16

## 2018-03-17 RX ADMIN — Medication 1 APPLICATION(S): at 14:52

## 2018-03-17 RX ADMIN — HEPARIN SODIUM 5000 UNIT(S): 5000 INJECTION INTRAVENOUS; SUBCUTANEOUS at 14:52

## 2018-03-17 RX ADMIN — Medication 650 MILLIGRAM(S): at 18:51

## 2018-03-17 RX ADMIN — Medication 60 MILLIGRAM(S): at 06:16

## 2018-03-17 RX ADMIN — Medication 200 MILLIGRAM(S): at 06:16

## 2018-03-17 RX ADMIN — Medication 1 TABLET(S): at 13:06

## 2018-03-17 RX ADMIN — PREGABALIN 1000 MICROGRAM(S): 225 CAPSULE ORAL at 13:06

## 2018-03-17 RX ADMIN — Medication 40 MILLIGRAM(S): at 06:16

## 2018-03-17 RX ADMIN — Medication 650 MILLIGRAM(S): at 06:16

## 2018-03-17 RX ADMIN — Medication 40 MILLIGRAM(S): at 18:51

## 2018-03-17 RX ADMIN — Medication 81 MILLIGRAM(S): at 13:06

## 2018-03-17 RX ADMIN — Medication 200 MILLIGRAM(S): at 13:06

## 2018-03-17 NOTE — PROGRESS NOTE ADULT - PROBLEM SELECTOR PLAN 4
-S/p angiogram. Scheduled for fem-pop bypass that has been postponed secondary to suboptimal respiratory status.   -Consented via surrogate, Eres (patient's brother) & patient agreeable to surgery.   -L foot with cellulitis with resulting sepsis which is now improved since admission  S/p vancomycin and zosyn. Completed Unasyn on 2/22.  Podiatry has evaluated patient and report good improvement. recs appreciated.    Wound culture grew Acinetobacter, corynebacterium, staph haemolyticus and staph aureus. Blood cx with NGTD.  -LLE xrays negative for free air; CT read negative for free air.

## 2018-03-17 NOTE — PROGRESS NOTE ADULT - PROBLEM SELECTOR PLAN 3
Resolved; Likely due to uremic acidosis with accumulate of organic products  - c/w Bicarb to 650mg BID

## 2018-03-17 NOTE — PROGRESS NOTE ADULT - SUBJECTIVE AND OBJECTIVE BOX
[facility] DIVISION OF KIDNEY DISEASES AND HYPERTENSION -- PROGRESS NOTE    HPI: 63-year-old male with PMH of HTN, DM, right BKA, and CKD admitted for left foot infection. As per review of labs on [place]/[employer], Scr was 1.51 in 3/2017. As per PMD's office, Scr checked in 7/2017 was 2.30. Labs on admission (2/12) showed elevated Scr of 3.8 which peaked to 6.3 on 2/23 and is stable at 4.44 today. Pt. underwent vascular study/left leg angiogram on 3/8. Patient seen and examined today. Pt experienced SOB overnight which has now resolved with IV diuretic therapy. Pt feels well and has no complaints. Comfortable off nasal cannula at bedside. Pt. denies SOB, CP or N/V.       PAST HISTORY  --------------------------------------------------------------------------------  No significant changes to PMH, PSH, FHx, SHx, unless otherwise noted    ALLERGIES & MEDICATIONS  --------------------------------------------------------------------------------  Allergies    No Known Allergies    Intolerances      Standing Inpatient Medications  aspirin enteric coated 81 milliGRAM(s) Oral daily  atorvastatin 80 milliGRAM(s) Oral at bedtime  cyanocobalamin 1000 MICROGram(s) Oral daily  dextrose 5%. 1000 milliLiter(s) IV Continuous <Continuous>  dextrose 50% Injectable 25 Gram(s) IV Push once  dextrose 50% Injectable 25 Gram(s) IV Push once  furosemide   Injectable 40 milliGRAM(s) IV Push two times a day  heparin  Injectable 5000 Unit(s) SubCutaneous every 8 hours  influenza   Vaccine 0.5 milliLiter(s) IntraMuscular once  insulin lispro (HumaLOG) corrective regimen sliding scale   SubCutaneous three times a day before meals  labetalol 200 milliGRAM(s) Oral three times a day  multivitamin 1 Tablet(s) Oral daily  NIFEdipine XL 60 milliGRAM(s) Oral daily  silver sulfADIAZINE 1% Cream 1 Application(s) Topical daily  sodium bicarbonate 650 milliGRAM(s) Oral every 12 hours    PRN Inpatient Medications  acetaminophen   Tablet. 650 milliGRAM(s) Oral every 6 hours PRN  dextrose Gel 1 Dose(s) Oral once PRN  glucagon  Injectable 1 milliGRAM(s) IntraMuscular once PRN      REVIEW OF SYSTEMS  --------------------------------------------------------------------------------  Constitutional: [ ] Fever [ ] Chills [x ] no Fatigue [ ] Weight change   HEENT: [ ] Blurred vision [ ] Eye Pain [ ] Headache [ ] Runny nose [ ] Sore Throat   Respiratory: [ ] Cough [ ] Wheezing [x ] no Shortness of breath  Cardiovascular: [x ] no Chest Pain [ ] Palpitations [ ] MENSAH [ ] PND [ ] Orthopnea  Gastrointestinal: [ ] Abdominal Pain [ ] Diarrhea [ ] Constipation [ ] Hemorrhoids [ ] Nausea [ ] Vomiting  Genitourinary: [ ] Nocturia [ ] Dysuria [ ] Incontinence  Extremities: [ ] Swelling [ ] Joint Pain  Neurologic: [ ] Focal deficit [ ] Paresthesias [ ] Syncope  Lymphatic: [ ] Swelling [ ] Lymphadenopathy   Skin: [ ] Rash [ ] Ecchymoses [ ] Wounds [ ] Lesions  Psychiatry: [ ] Depression [ ] Suicidal/Homicidal Ideation [ ] Anxiety [ ] Sleep Disturbances  [x ] 10 point review of systems is otherwise negative except as mentioned above              [ ]Unable to obtain due to   All other systems were reviewed and are negative, except as noted.    VITALS/PHYSICAL EXAM  --------------------------------------------------------------------------------  T(C): 37.2 (03-17-18 @ 05:54), Max: 37.2 (03-17-18 @ 05:54)  HR: 63 (03-17-18 @ 06:30) (62 - 68)  BP: 147/61 (03-17-18 @ 06:30) (143/51 - 167/73)  RR: 20 (03-17-18 @ 05:54) (18 - 20)  SpO2: 95% (03-17-18 @ 05:54) (93% - 97%)  Wt(kg): --        03-16-18 @ 07:01  -  03-17-18 @ 07:00  --------------------------------------------------------  IN: 180 mL / OUT: 2225 mL / NET: -2045 mL      Physical Exam:  	Gen: NAD, well-appearing  	HEENT: on room air  	Pulm: CTA B/L  	CV: normal S1S2; no rub  	Abd: soft                      Back : No sacral edema  	: No cervantes  	LE: no edema  	Skin: Warm, without rashes  	    LABS/STUDIES  --------------------------------------------------------------------------------              9.6    4.13  >-----------<  238      [03-17-18 @ 06:45]              29.7     141  |  103  |  52  ----------------------------<  62      [03-17-18 @ 06:45]  4.8   |  22  |  4.09        Ca     8.3     [03-17-18 @ 06:45]      Mg     1.9     [03-17-18 @ 06:45]      Phos  5.0     [03-17-18 @ 06:45]            Creatinine Trend:  SCr 4.09 [03-17 @ 06:45]  SCr 4.17 [03-16 @ 17:52]  SCr 4.15 [03-16 @ 06:30]  SCr 4.34 [03-15 @ 07:12]  SCr 4.40 [03-14 @ 06:15]    Urinalysis - [02-25-18 @ 04:50]      Color PLYEL / Appearance CLEAR / SG 1.015 / pH 5.5      Gluc NEGATIVE / Ketone NEGATIVE  / Bili NEGATIVE / Urobili NORMAL       Blood TRACE / Protein 100 / Leuk Est TRACE / Nitrite NEGATIVE      RBC 0-2 / WBC 2-5 / Hyaline  / Gran  / Sq Epi OCC / Non Sq Epi  / Bacteria       Iron 21, TIBC 163, %sat --      [02-20-18 @ 05:00]  Ferritin 540.7      [02-20-18 @ 05:00]  HbA1c 5.9      [02-13-18 @ 07:11]  TSH 3.22      [03-08-18 @ 06:00]      Free Light Chains: kappa 20.50, lambda 11.20, ratio = 1.83 H      [02-25 @ 07:43]

## 2018-03-17 NOTE — PROGRESS NOTE ADULT - PROBLEM SELECTOR PLAN 2
Likely due to pulmonary edema given acuity and CXR confirming such; possibly due to acute on chronic HFpEF in the setting of JANY.   - Monitor I's and O's & oxygen saturation  - c/w Lasix 40 IVP BID and monitor SCr closely; net negative 2 L Likely due to pulmonary edema given acuity and CXR confirming such; possibly due to acute on chronic HFpEF in the setting of JANY & valvular dysfunction.   - Monitor I's and O's & oxygen saturation  - c/w Lasix 40 IVP BID and monitor SCr closely; net negative 2 L. Will Continue standing diuretics for now & adjust dose as necessary

## 2018-03-17 NOTE — PROGRESS NOTE ADULT - SUBJECTIVE AND OBJECTIVE BOX
GENERAL SURGERY DAILY PROGRESS NOTE:       Subjective:  NAOE. Groin remains flat. Pt getting lasix BID. Pt has not been OOB. Pt has not yet given choices for ECTOR yet.         Objective:    PE:  Gen: Alert, NAD  Abd benign   Ext: LLE w/ kerlex dressing in place; s/p R BKA, healed left toe wound stable w eschar      Vital Signs Last 24 Hrs  T(C): 37.2 (17 Mar 2018 05:54), Max: 37.2 (17 Mar 2018 05:54)  T(F): 99 (17 Mar 2018 05:54), Max: 99 (17 Mar 2018 05:54)  HR: 63 (17 Mar 2018 06:30) (62 - 68)  BP: 147/61 (17 Mar 2018 06:30) (143/51 - 167/73)  BP(mean): --  RR: 20 (17 Mar 2018 05:54) (18 - 20)  SpO2: 95% (17 Mar 2018 05:54) (93% - 97%)    I&O's Detail    16 Mar 2018 07:01  -  17 Mar 2018 07:00  --------------------------------------------------------  IN:    Oral Fluid: 180 mL  Total IN: 180 mL    OUT:    Voided: 2225 mL  Total OUT: 2225 mL    Total NET: -2045 mL          Daily     Daily Weight in k (17 Mar 2018 05:54)    MEDICATIONS  (STANDING):  aspirin enteric coated 81 milliGRAM(s) Oral daily  atorvastatin 80 milliGRAM(s) Oral at bedtime  cyanocobalamin 1000 MICROGram(s) Oral daily  dextrose 5%. 1000 milliLiter(s) (50 mL/Hr) IV Continuous <Continuous>  dextrose 50% Injectable 25 Gram(s) IV Push once  dextrose 50% Injectable 25 Gram(s) IV Push once  furosemide   Injectable 40 milliGRAM(s) IV Push two times a day  heparin  Injectable 5000 Unit(s) SubCutaneous every 8 hours  influenza   Vaccine 0.5 milliLiter(s) IntraMuscular once  insulin lispro (HumaLOG) corrective regimen sliding scale   SubCutaneous three times a day before meals  labetalol 200 milliGRAM(s) Oral three times a day  multivitamin 1 Tablet(s) Oral daily  NIFEdipine XL 60 milliGRAM(s) Oral daily  silver sulfADIAZINE 1% Cream 1 Application(s) Topical daily  sodium bicarbonate 650 milliGRAM(s) Oral every 12 hours    MEDICATIONS  (PRN):  acetaminophen   Tablet. 650 milliGRAM(s) Oral every 6 hours PRN Moderate Pain (4 - 6)  dextrose Gel 1 Dose(s) Oral once PRN Blood Glucose LESS THAN 70 milliGRAM(s)/deciliter  glucagon  Injectable 1 milliGRAM(s) IntraMuscular once PRN Glucose LESS THAN 70 milligrams/deciliter      LABS:                        9.6    4.13  )-----------( 238      ( 17 Mar 2018 06:45 )             29.7         141  |  103  |  52<H>  ----------------------------<  62<L>  4.8   |  22  |  4.09<H>    Ca    8.3<L>      17 Mar 2018 06:45  Phos  5.0       Mg     1.9

## 2018-03-17 NOTE — PROGRESS NOTE ADULT - SUBJECTIVE AND OBJECTIVE BOX
Patient is a 63y old  Male who presents with a chief complaint of 63M PMH DM, HTN, CKD p/w L foot pain x 3 days. (19 Feb 2018 10:39)      SUBJECTIVE / OVERNIGHT EVENTS: No acute overnight event. Weaned off NC; output -2.0 L in last 24 hrs. Denies f/c/CP/SOB/N/V    MEDICATIONS  (STANDING):  aspirin enteric coated 81 milliGRAM(s) Oral daily  atorvastatin 80 milliGRAM(s) Oral at bedtime  cyanocobalamin 1000 MICROGram(s) Oral daily  dextrose 5%. 1000 milliLiter(s) (50 mL/Hr) IV Continuous <Continuous>  dextrose 50% Injectable 25 Gram(s) IV Push once  dextrose 50% Injectable 25 Gram(s) IV Push once  furosemide   Injectable 40 milliGRAM(s) IV Push two times a day  heparin  Injectable 5000 Unit(s) SubCutaneous every 8 hours  influenza   Vaccine 0.5 milliLiter(s) IntraMuscular once  insulin lispro (HumaLOG) corrective regimen sliding scale   SubCutaneous three times a day before meals  labetalol 200 milliGRAM(s) Oral three times a day  multivitamin 1 Tablet(s) Oral daily  NIFEdipine XL 60 milliGRAM(s) Oral daily  silver sulfADIAZINE 1% Cream 1 Application(s) Topical daily  sodium bicarbonate 650 milliGRAM(s) Oral every 12 hours    MEDICATIONS  (PRN):  acetaminophen   Tablet. 650 milliGRAM(s) Oral every 6 hours PRN Moderate Pain (4 - 6)  dextrose Gel 1 Dose(s) Oral once PRN Blood Glucose LESS THAN 70 milliGRAM(s)/deciliter  glucagon  Injectable 1 milliGRAM(s) IntraMuscular once PRN Glucose LESS THAN 70 milligrams/deciliter        CAPILLARY BLOOD GLUCOSE      POCT Blood Glucose.: 68 mg/dL (17 Mar 2018 08:36)  POCT Blood Glucose.: 94 mg/dL (16 Mar 2018 21:53)  POCT Blood Glucose.: 98 mg/dL (16 Mar 2018 17:57)  POCT Blood Glucose.: 99 mg/dL (16 Mar 2018 11:01)    I&O's Summary    16 Mar 2018 07:01  -  17 Mar 2018 07:00  --------------------------------------------------------  IN: 180 mL / OUT: 2225 mL / NET: -2045 mL        PHYSICAL EXAM:  GENERAL: no acute distress; speaking in complete sentences   HEAD:  Atraumatic, Normocephalic  EYES: EOMI, PERRLA,   NECK: Supple, No JVD  CHEST/LUNG: Bibasilar rales otherwise clear in other lung fields. No wheeze  HEART: Regular rate and rhythm; No murmurs, rubs, or gallops  ABDOMEN: Soft, Nontender, Nondistended; Bowel sounds present  EXTREMITIES:  2+ Peripheral Pulses, Trace LLE pedal edema; unable to palpate DP pulse.   PSYCH: AAOx3  NEUROLOGY: non-focal  SKIN: No rashes or lesions    LABS:  (03-17 @ 06:45)                        9.6  4.13 )-----------( 238                 29.7    Neutrophils = -- (--%)  Lymphocytes = -- (--%)  Eosinophils = -- (--%)  Basophils = -- (--%)  Monocytes = -- (--%)  Bands = --%    WBC Trend: 4.13<--, 4.28<--, 4.46<--  Hb Trend: 9.6<--, 9.8<--, 10.2<--, 9.1<--, 7.4<--  Plt Trend: 238<--, 235<--, 241<--, 239<--, 260<--  03-17    141  |  103  |  52<H>  ----------------------------<  62<L>  4.8   |  22  |  4.09<H>    Ca    8.3<L>      17 Mar 2018 06:45  Phos  5.0     03-17  Mg     1.9     03-17      Creatinine Trend: 4.09<--, 4.17<--, 4.15<--, 4.34<--, 4.40<--, 4.44<--      Consultant(s) Notes Reviewed:  Vascular Sx    Care Discussed with Consultants/Other Providers: Vascular Sx Patient is a 63y old  Male who presents with a chief complaint of 63M PMH DM, HTN, CKD p/w L foot pain x 3 days. (19 Feb 2018 10:39)      SUBJECTIVE / OVERNIGHT EVENTS: No acute overnight event. Weaned off NC; output -2.0 L in last 24 hrs. Denies f/c/CP/SOB/N/V    MEDICATIONS  (STANDING):  aspirin enteric coated 81 milliGRAM(s) Oral daily  atorvastatin 80 milliGRAM(s) Oral at bedtime  cyanocobalamin 1000 MICROGram(s) Oral daily  dextrose 5%. 1000 milliLiter(s) (50 mL/Hr) IV Continuous <Continuous>  dextrose 50% Injectable 25 Gram(s) IV Push once  dextrose 50% Injectable 25 Gram(s) IV Push once  furosemide   Injectable 40 milliGRAM(s) IV Push two times a day  heparin  Injectable 5000 Unit(s) SubCutaneous every 8 hours  influenza   Vaccine 0.5 milliLiter(s) IntraMuscular once  insulin lispro (HumaLOG) corrective regimen sliding scale   SubCutaneous three times a day before meals  labetalol 200 milliGRAM(s) Oral three times a day  multivitamin 1 Tablet(s) Oral daily  NIFEdipine XL 60 milliGRAM(s) Oral daily  silver sulfADIAZINE 1% Cream 1 Application(s) Topical daily  sodium bicarbonate 650 milliGRAM(s) Oral every 12 hours    MEDICATIONS  (PRN):  acetaminophen   Tablet. 650 milliGRAM(s) Oral every 6 hours PRN Moderate Pain (4 - 6)  dextrose Gel 1 Dose(s) Oral once PRN Blood Glucose LESS THAN 70 milliGRAM(s)/deciliter  glucagon  Injectable 1 milliGRAM(s) IntraMuscular once PRN Glucose LESS THAN 70 milligrams/deciliter        CAPILLARY BLOOD GLUCOSE      POCT Blood Glucose.: 68 mg/dL (17 Mar 2018 08:36)  POCT Blood Glucose.: 94 mg/dL (16 Mar 2018 21:53)  POCT Blood Glucose.: 98 mg/dL (16 Mar 2018 17:57)  POCT Blood Glucose.: 99 mg/dL (16 Mar 2018 11:01)    I&O's Summary    16 Mar 2018 07:01  -  17 Mar 2018 07:00  --------------------------------------------------------  IN: 180 mL / OUT: 2225 mL / NET: -2045 mL        PHYSICAL EXAM:  GENERAL: no acute distress; speaking in complete sentences   HEAD:  Atraumatic, Normocephalic  EYES: EOMI, PERRLA,   NECK: Supple, No JVD  CHEST/LUNG: Bibasilar rales otherwise clear in other lung fields. No wheeze  HEART: Regular rate and rhythm; No murmurs, rubs, or gallops  ABDOMEN: Soft, Nontender, Nondistended; Bowel sounds present  EXTREMITIES:  2+ Peripheral Pulses, Trace LLE pedal edema; unable to palpate DP pulse.   PSYCH: AAOx3  NEUROLOGY: non-focal  SKIN: No rashes or lesions    LABS:  (03-17 @ 06:45)                        9.6  4.13 )-----------( 238                 29.7    Neutrophils = -- (--%)  Lymphocytes = -- (--%)  Eosinophils = -- (--%)  Basophils = -- (--%)  Monocytes = -- (--%)  Bands = --%    WBC Trend: 4.13<--, 4.28<--, 4.46<--  Hb Trend: 9.6<--, 9.8<--, 10.2<--, 9.1<--, 7.4<--  Plt Trend: 238<--, 235<--, 241<--, 239<--, 260<--  03-17    141  |  103  |  52<H>  ----------------------------<  62<L>  4.8   |  22  |  4.09<H>    Ca    8.3<L>      17 Mar 2018 06:45  Phos  5.0     03-17  Mg     1.9     03-17      Creatinine Trend: 4.09<--, 4.17<--, 4.15<--, 4.34<--, 4.40<--, 4.44<--    RADIOLOGY    Focused US: B-lines bilaterally; moderate size effusions bilaterally     Consultant(s) Notes Reviewed:  Vascular Sx    Care Discussed with Consultants/Other Providers: Vascular Sx

## 2018-03-17 NOTE — PROGRESS NOTE ADULT - PROBLEM SELECTOR PLAN 6
s/p 2 units for pre-op optimization. Hgb stable   Folate, B12, TSH all WNL.  Likely 2/2 in setting of decreased EPO production d/t worsening kidney function. Hemolysis less likely. T bili is WNL. Acute blood loss also less likely as no source.

## 2018-03-17 NOTE — PROGRESS NOTE ADULT - PROBLEM SELECTOR PLAN 10
-Patient is high risk for moderate risk procedure. RCRI 3; will medically optimize patient and improve volume status for surgery.     Dalton Nunez D.O.  PGY-1 EM/IM  Pager #[phone]

## 2018-03-17 NOTE — PROGRESS NOTE ADULT - ATTENDING COMMENTS
pt seen and examined by me on 3/17  1) Hypoxic respiratory failure likely secondary to volume overload- pt recd 2 units PRBC plus 500 cc of IVF 4  days back  pt requiring 2 L oxygen.  Chest exam notable for decreased breath sounds bilaterally, more  on right side and bilateral crackles.   Presence of RLE edema   At time of my exam, pt reports feeling better, and is toelrating off O2     On IV lasix 40 BID IV, Dw Staff- pt with good urine outpt  monitor response to diuretics   Reviewed TTE- mild AS, pul HTN  Repeat Pro BNP   Renal on board- Appreciate recommendations- continue IV Lasix 40 mg BID .

## 2018-03-17 NOTE — PROGRESS NOTE ADULT - ASSESSMENT
63M PMHx DM, s/p R BKA, now presenting with Diabetic foot soft tissue infection, w/ angiography showing arterial stenosis of LLE, plan for LLE bypass. Or cancelled due to pulm effusions. Pt Cr improving.     - continue diet  - f/u AM labs  - f/u renal  - f/u CXR - shows worsening effusions  - medically optimize  - give IV lasix  - SQH, ASA  - Wound care as per podiatry

## 2018-03-17 NOTE — PROGRESS NOTE ADULT - PROBLEM SELECTOR PLAN 1
Pt. with JANY on CKD in the setting of infection and diarrhea. CKD in the setting of long-standing DM. Scr was 1.5 in 3/2017, increased to 2.30 in 7/2017. Scr on admission (2/12) was elevated at 3.81, increased to 6.3 on 2/23 and has now improved. Pt. also underwent vascular study/left leg angiogram on 3/8. Scr improved to 4.0 today. Pt. at increased risk for radiocontrast nephropathy. Monitor BMP and urine output. Avoid any potential nephrotoxins. denies

## 2018-03-17 NOTE — PROGRESS NOTE ADULT - PROBLEM SELECTOR PLAN 2
Pt with hypervolemia in the setting of CKD. Pt. responsive to IV diuretic therapy. Continue Lasix 40 IV BID. Pt clinically stable today. Labs reviewed. No acute indication for renal replacement therapy at this time. Monitor weight daily.

## 2018-03-17 NOTE — PROGRESS NOTE ADULT - PROBLEM SELECTOR PLAN 9
-DVT ppx: HSQ  -Diabetic Diet    Disposition: possible ECTOR to follow for outpatient procedure pending surgeon accepting patient's insurance as outpatient.

## 2018-03-17 NOTE — PROGRESS NOTE ADULT - PROBLEM SELECTOR PLAN 1
-Creatinine remains 4.1 now stable CKD V in the setting of  ATN from sepsis & LANNY.   -continuing to monitor urine outputs, trend sCr daily  -SCr improving with diuresis; will continue with standing Lasix for now -Creatinine downtrending with diuresis now stable CKD V in the setting of  ATN from sepsis & LANNY.   -continuing to monitor urine outputs, trend sCr daily  -SCr improving with diuresis; will continue with standing Lasix for now

## 2018-03-18 LAB
BUN SERPL-MCNC: 54 MG/DL — HIGH (ref 7–23)
CALCIUM SERPL-MCNC: 8.6 MG/DL — SIGNIFICANT CHANGE UP (ref 8.4–10.5)
CHLORIDE SERPL-SCNC: 104 MMOL/L — SIGNIFICANT CHANGE UP (ref 98–107)
CO2 SERPL-SCNC: 24 MMOL/L — SIGNIFICANT CHANGE UP (ref 22–31)
CREAT SERPL-MCNC: 4.21 MG/DL — HIGH (ref 0.5–1.3)
GLUCOSE BLDC GLUCOMTR-MCNC: 104 MG/DL — HIGH (ref 70–99)
GLUCOSE BLDC GLUCOMTR-MCNC: 106 MG/DL — HIGH (ref 70–99)
GLUCOSE BLDC GLUCOMTR-MCNC: 128 MG/DL — HIGH (ref 70–99)
GLUCOSE BLDC GLUCOMTR-MCNC: 74 MG/DL — SIGNIFICANT CHANGE UP (ref 70–99)
GLUCOSE SERPL-MCNC: 69 MG/DL — LOW (ref 70–99)
HCT VFR BLD CALC: 31.9 % — LOW (ref 39–50)
HGB BLD-MCNC: 9.7 G/DL — LOW (ref 13–17)
MAGNESIUM SERPL-MCNC: 1.8 MG/DL — SIGNIFICANT CHANGE UP (ref 1.6–2.6)
MCHC RBC-ENTMCNC: 26.1 PG — LOW (ref 27–34)
MCHC RBC-ENTMCNC: 30.4 % — LOW (ref 32–36)
MCV RBC AUTO: 85.8 FL — SIGNIFICANT CHANGE UP (ref 80–100)
NRBC # FLD: 0 — SIGNIFICANT CHANGE UP
PHOSPHATE SERPL-MCNC: 4.6 MG/DL — HIGH (ref 2.5–4.5)
PLATELET # BLD AUTO: 263 K/UL — SIGNIFICANT CHANGE UP (ref 150–400)
PMV BLD: 11.7 FL — SIGNIFICANT CHANGE UP (ref 7–13)
POTASSIUM SERPL-MCNC: 4.7 MMOL/L — SIGNIFICANT CHANGE UP (ref 3.5–5.3)
POTASSIUM SERPL-SCNC: 4.7 MMOL/L — SIGNIFICANT CHANGE UP (ref 3.5–5.3)
RBC # BLD: 3.72 M/UL — LOW (ref 4.2–5.8)
RBC # FLD: 14.6 % — HIGH (ref 10.3–14.5)
SODIUM SERPL-SCNC: 142 MMOL/L — SIGNIFICANT CHANGE UP (ref 135–145)
WBC # BLD: 4.74 K/UL — SIGNIFICANT CHANGE UP (ref 3.8–10.5)
WBC # FLD AUTO: 4.74 K/UL — SIGNIFICANT CHANGE UP (ref 3.8–10.5)

## 2018-03-18 PROCEDURE — 71045 X-RAY EXAM CHEST 1 VIEW: CPT | Mod: 26

## 2018-03-18 PROCEDURE — 99233 SBSQ HOSP IP/OBS HIGH 50: CPT | Mod: GC

## 2018-03-18 PROCEDURE — 71250 CT THORAX DX C-: CPT | Mod: 26

## 2018-03-18 RX ORDER — FUROSEMIDE 40 MG
60 TABLET ORAL
Refills: 0 | Status: DISCONTINUED | OUTPATIENT
Start: 2018-03-18 | End: 2018-03-18

## 2018-03-18 RX ORDER — MAGNESIUM SULFATE 500 MG/ML
2 VIAL (ML) INJECTION ONCE
Refills: 0 | Status: COMPLETED | OUTPATIENT
Start: 2018-03-18 | End: 2018-03-18

## 2018-03-18 RX ORDER — FUROSEMIDE 40 MG
40 TABLET ORAL ONCE
Refills: 0 | Status: COMPLETED | OUTPATIENT
Start: 2018-03-18 | End: 2018-03-18

## 2018-03-18 RX ORDER — IPRATROPIUM/ALBUTEROL SULFATE 18-103MCG
3 AEROSOL WITH ADAPTER (GRAM) INHALATION EVERY 6 HOURS
Refills: 0 | Status: COMPLETED | OUTPATIENT
Start: 2018-03-18 | End: 2018-03-20

## 2018-03-18 RX ORDER — BUMETANIDE 0.25 MG/ML
2.5 INJECTION INTRAMUSCULAR; INTRAVENOUS
Qty: 10 | Refills: 0 | Status: DISCONTINUED | OUTPATIENT
Start: 2018-03-18 | End: 2018-03-20

## 2018-03-18 RX ADMIN — Medication 3 MILLILITER(S): at 23:37

## 2018-03-18 RX ADMIN — Medication 3 MILLILITER(S): at 11:16

## 2018-03-18 RX ADMIN — Medication 1 APPLICATION(S): at 11:01

## 2018-03-18 RX ADMIN — HEPARIN SODIUM 5000 UNIT(S): 5000 INJECTION INTRAVENOUS; SUBCUTANEOUS at 14:12

## 2018-03-18 RX ADMIN — ATORVASTATIN CALCIUM 80 MILLIGRAM(S): 80 TABLET, FILM COATED ORAL at 22:18

## 2018-03-18 RX ADMIN — Medication 200 MILLIGRAM(S): at 06:14

## 2018-03-18 RX ADMIN — HEPARIN SODIUM 5000 UNIT(S): 5000 INJECTION INTRAVENOUS; SUBCUTANEOUS at 06:14

## 2018-03-18 RX ADMIN — Medication 650 MILLIGRAM(S): at 17:32

## 2018-03-18 RX ADMIN — Medication 81 MILLIGRAM(S): at 11:01

## 2018-03-18 RX ADMIN — Medication 200 MILLIGRAM(S): at 14:12

## 2018-03-18 RX ADMIN — BUMETANIDE 25 MG/HR: 0.25 INJECTION INTRAMUSCULAR; INTRAVENOUS at 16:47

## 2018-03-18 RX ADMIN — Medication 3 MILLILITER(S): at 16:26

## 2018-03-18 RX ADMIN — Medication 200 MILLIGRAM(S): at 23:23

## 2018-03-18 RX ADMIN — Medication 650 MILLIGRAM(S): at 23:10

## 2018-03-18 RX ADMIN — Medication 650 MILLIGRAM(S): at 22:15

## 2018-03-18 RX ADMIN — HEPARIN SODIUM 5000 UNIT(S): 5000 INJECTION INTRAVENOUS; SUBCUTANEOUS at 23:22

## 2018-03-18 RX ADMIN — Medication 50 GRAM(S): at 11:01

## 2018-03-18 RX ADMIN — Medication 650 MILLIGRAM(S): at 06:14

## 2018-03-18 RX ADMIN — Medication 40 MILLIGRAM(S): at 12:04

## 2018-03-18 RX ADMIN — Medication 40 MILLIGRAM(S): at 06:14

## 2018-03-18 RX ADMIN — Medication 60 MILLIGRAM(S): at 06:14

## 2018-03-18 RX ADMIN — PREGABALIN 1000 MICROGRAM(S): 225 CAPSULE ORAL at 11:01

## 2018-03-18 RX ADMIN — Medication 1 TABLET(S): at 11:01

## 2018-03-18 NOTE — CONSULT NOTE ADULT - ASSESSMENT
To summarize,  63M PMH of DM on januvia, HTN, CKD, Rt BKA in 2009 who p/w sepsis 2/2 cellulitis and LLE SFA disease, c/b ATN, now awaiting Lt fem-pop bypass.      Assessment : The etiology of the pleural effusions is secondary to volume overload from ADHF. On POCUS, the effusions are simple without e/o septations. It seems like a transudate on imaging, and that is consistent with the clinical suspicion    He is on Lasix and his SOB has actually improved with that. On POCUS, his diaphragms are convex without evidence of tenting and concavity suggestive of weight overload from effusions    Recs : Would recommend against a thoracentesis for diagnostic purposes.  There is an argument to be made for therapeutic thora. If he was going for surgery where we would be anticipating aggresive volume resuscitation, he would receive a thoracentesis today. As of right now, would recommend aggresive diuresis. Agree with bumex, can consider even adding a thiazide diuretic on top of the loop    The pulmonary team will continue to follow, if no response, will perform a bedside thoracentesis    Thank you for the privilege of assisting in Mr. Sim's care  Dw Dr Henriquez

## 2018-03-18 NOTE — PROGRESS NOTE ADULT - PROBLEM SELECTOR PLAN 8
Pt on oral medications at home.   c/w ISS  Well controlled -DVT ppx: HSQ  -Diabetic Diet    Disposition: possible ECTOR to follow for outpatient procedure pending surgeon accepting patient's insurance as outpatient.

## 2018-03-18 NOTE — PROGRESS NOTE ADULT - SUBJECTIVE AND OBJECTIVE BOX
GENERAL SURGERY DAILY PROGRESS NOTE:       Subjective:  NAOE. Groin remains flat. Pt getting lasix BID. Pt has not been OOB. Pt has not yet given choices for ECTOR yet. Chest XR yesterday demonstrated worsening pulm effusions, Chest CT pending.         Objective:    PE:  Gen: Alert, NAD  Ext: LLE w/ kerlex dressing in place; s/p R BKA, healed left toe wound stable w eschar      Vital Signs Last 24 Hrs  T(C): 36.9 (18 Mar 2018 05:10), Max: 36.9 (18 Mar 2018 05:10)  T(F): 98.4 (18 Mar 2018 05:10), Max: 98.4 (18 Mar 2018 05:10)  HR: 66 (18 Mar 2018 05:10) (56 - 66)  BP: 169/66 (18 Mar 2018 05:10) (136/59 - 169/66)  BP(mean): --  RR: 20 (18 Mar 2018 05:10) (18 - 20)  SpO2: 96% (18 Mar 2018 05:10) (86% - 96%)    I&O's Detail    17 Mar 2018 07:01  -  18 Mar 2018 07:00  --------------------------------------------------------  IN:  Total IN: 0 mL    OUT:    Voided: 950 mL  Total OUT: 950 mL    Total NET: -950 mL      18 Mar 2018 07:01  -  18 Mar 2018 10:44  --------------------------------------------------------  IN:  Total IN: 0 mL    OUT:    Voided: 550 mL  Total OUT: 550 mL    Total NET: -550 mL          Daily     Daily     MEDICATIONS  (STANDING):  aspirin enteric coated 81 milliGRAM(s) Oral daily  atorvastatin 80 milliGRAM(s) Oral at bedtime  cyanocobalamin 1000 MICROGram(s) Oral daily  dextrose 5%. 1000 milliLiter(s) (50 mL/Hr) IV Continuous <Continuous>  dextrose 50% Injectable 25 Gram(s) IV Push once  dextrose 50% Injectable 25 Gram(s) IV Push once  furosemide   Injectable 40 milliGRAM(s) IV Push two times a day  heparin  Injectable 5000 Unit(s) SubCutaneous every 8 hours  influenza   Vaccine 0.5 milliLiter(s) IntraMuscular once  insulin lispro (HumaLOG) corrective regimen sliding scale   SubCutaneous three times a day before meals  labetalol 200 milliGRAM(s) Oral three times a day  magnesium sulfate  IVPB 2 Gram(s) IV Intermittent once  multivitamin 1 Tablet(s) Oral daily  NIFEdipine XL 60 milliGRAM(s) Oral daily  silver sulfADIAZINE 1% Cream 1 Application(s) Topical daily  sodium bicarbonate 650 milliGRAM(s) Oral every 12 hours    MEDICATIONS  (PRN):  acetaminophen   Tablet. 650 milliGRAM(s) Oral every 6 hours PRN Moderate Pain (4 - 6)  dextrose Gel 1 Dose(s) Oral once PRN Blood Glucose LESS THAN 70 milliGRAM(s)/deciliter  glucagon  Injectable 1 milliGRAM(s) IntraMuscular once PRN Glucose LESS THAN 70 milligrams/deciliter      LABS:                        9.7    4.74  )-----------( 263      ( 18 Mar 2018 05:49 )             31.9     03-18    142  |  104  |  54<H>  ----------------------------<  69<L>  4.7   |  24  |  4.21<H>    Ca    8.6      18 Mar 2018 05:49  Phos  4.6     03-18  Mg     1.8     03-18

## 2018-03-18 NOTE — PROGRESS NOTE ADULT - PROBLEM SELECTOR PLAN 7
-decrease labetalol dose due to mild bradycardia  -c/w Nifedipine XL; will uptitrate if necessary Pt on oral medications at home.   c/w ISS  Well controlled

## 2018-03-18 NOTE — PROGRESS NOTE ADULT - PROBLEM SELECTOR PLAN 1
-Creatinine downtrending with diuresis now stable CKD V in the setting of  ATN from sepsis & LANNY.   -continuing to monitor urine outputs, trend sCr daily  -SCr improving with diuresis; will continue with standing Lasix for now Pulmonary edema + significant pleural effusions bilaterally despite being on IV lasix   Cardiology consult appreciated, will start bumex gtt   Monitor I's and O's & daily weight   Pulm consult in am

## 2018-03-18 NOTE — PROGRESS NOTE ADULT - ATTENDING COMMENTS
pt seen and examined by me Agree with resident  1) Hypoxic respiratory failure likely secondary to volume overload- pt recd 2 units PRBC plus 500 cc of IVF 4  days back  pt requiring 2 L oxygen.  Chest exam notable for decreased breath sounds bilaterally, more  on right side and bilateral crackles.   Presence of RLE edema   pt with ongoing hypoxia and SOB   CT chest - presence of bilateral effusions with compressive atelectasis   On IV lasix 40 BID IV, Will give additional 40 mg and increase to 60 mg BID  Will obtain HF consult   Will consider Pul consult in AM if  no improvement with diuretics  2) pt seen and examined by me Agree with resident  1) Hypoxic respiratory failure/Acute Diastolic HF  likely secondary to volume overload- pt recd 2 units PRBC plus 500 cc of IVF 4  days back  pt requiring 2 L oxygen.  Chest exam notable for decreased breath sounds bilaterally, more  on right side and bilateral crackles.   Presence of RLE edema   pt with ongoing hypoxia and SOB   CT chest - presence of bilateral effusions with compressive atelectasis   On IV lasix 40 BID IV, was given additional 40 mg and Lasix was  increased to 60 mg BID  Appreciate HF consult - plan for bumex gtt   Advise  Pul consult   2) JANY likely ATN on underlying CKD  Creatinine 4.27   Avoid nephrotoxins. Monitor I& O, monitor BMP  3) H/o PAD s/p righ t  BKA and plan for fem-pop bypass on LE  On hold pending optimization of HF   4) DVT prox- HSQ

## 2018-03-18 NOTE — PROGRESS NOTE ADULT - ASSESSMENT
63M PMH DM on januvia, FS 80s at home, HTN, CKD, R BKA in 2009 presented with cellulitis and sepsis c/b ATN which improvedt found to have severe LLE SFA disease, now pending LLE fem-pop bypass.

## 2018-03-18 NOTE — PROGRESS NOTE ADULT - SUBJECTIVE AND OBJECTIVE BOX
Tele: off tele    Overnight: Still with SOB, no chest pain, palpitations, fever or chills     MEDICATIONS  (STANDING):  ALBUTerol/ipratropium for Nebulization 3 milliLiter(s) Nebulizer every 6 hours  aspirin enteric coated 81 milliGRAM(s) Oral daily  atorvastatin 80 milliGRAM(s) Oral at bedtime  cyanocobalamin 1000 MICROGram(s) Oral daily  furosemide   Injectable 60 milliGRAM(s) IV Push two times a day  heparin  Injectable 5000 Unit(s) SubCutaneous every 8 hours  influenza   Vaccine 0.5 milliLiter(s) IntraMuscular once  insulin lispro (HumaLOG) corrective regimen sliding scale   SubCutaneous three times a day before meals  labetalol 200 milliGRAM(s) Oral three times a day  multivitamin 1 Tablet(s) Oral daily  NIFEdipine XL 60 milliGRAM(s) Oral daily  silver sulfADIAZINE 1% Cream 1 Application(s) Topical daily  sodium bicarbonate 650 milliGRAM(s) Oral every 12 hours    MEDICATIONS  (PRN):  acetaminophen   Tablet. 650 milliGRAM(s) Oral every 6 hours PRN Moderate Pain (4 - 6)  dextrose Gel 1 Dose(s) Oral once PRN Blood Glucose LESS THAN 70 milliGRAM(s)/deciliter  glucagon  Injectable 1 milliGRAM(s) IntraMuscular once PRN Glucose LESS THAN 70 milligrams/deciliter          Vital Signs Last 24 Hrs  T(C): 36.7 (18 Mar 2018 13:09), Max: 36.9 (18 Mar 2018 05:10)  T(F): 98 (18 Mar 2018 13:09), Max: 98.4 (18 Mar 2018 05:10)  HR: 61 (18 Mar 2018 13:09) (56 - 68)  BP: 150/58 (18 Mar 2018 13:09) (147/68 - 169/66)  RR: 19 (18 Mar 2018 13:09) (18 - 20)  SpO2: 98% (18 Mar 2018 13:09) (87% - 98%)  I&O's Detail    17 Mar 2018 07:01  -  18 Mar 2018 07:00  --------------------------------------------------------  IN:  Total IN: 0 mL    OUT:    Voided: 950 mL  Total OUT: 950 mL    Total NET: -950 mL      18 Mar 2018 07:01  -  18 Mar 2018 13:37  --------------------------------------------------------  IN:  Total IN: 0 mL    OUT:    Voided: 775 mL  Total OUT: 775 mL    Total NET: -775 mL      Physical exam:   Gen- NAD   Resp- dec BS b/l 1/2 up  CV- S1S2 RRR, +JVD  ABD- +BS   EXT- R BKA L + edema   Neuro- A&Ox 3                             9.7    4.74  )-----------( 263      ( 18 Mar 2018 05:49 )             31.9       18 Mar 2018 05:49    142    |  104    |  54<H>  ----------------------------<  69<L>  4.7     |  24     |  4.21<H>  17 Mar 2018 06:45    141    |  103    |  52<H>  ----------------------------<  62<L>  4.8     |  22     |  4.09<H>    Ca    8.6        18 Mar 2018 05:49  Ca    8.3<L>      17 Mar 2018 06:45  Phos  4.6<H>     18 Mar 2018 05:49  Phos  5.0<H>     17 Mar 2018 06:45  Mg     1.8       18 Mar 2018 05:49  Mg     1.9       17 Mar 2018 06:45    from: Transthoracic Echocardiogram (03.02.18 @ 11:03) >  DIMENSIONS:  Dimensions:     Normal Values:  LA:     4.2 cm    2.0 - 4.0 cm  Ao:     2.9 cm    2.0 - 3.8 cm  SEPTUM: 0.6 cm    0.6 - 1.2 cm  PWT:    0.7 cm    0.6 - 1.1 cm  LVIDd:  5.0 cm    3.0 - 5.6 cm  LVIDs:  3.1 cm    1.8 - 4.0 cm  Derived Variables:  LVMI: 57 g/m2  RWT: 0.28  Fractional short: 38 %  Ejection Fraction (Teicholtz): 68 %  ------------------------------------------------------------------------  OBSERVATIONS:  Mitral Valve: Mitral annular calcification, otherwise  normal mitral valve. Mild mitral regurgitation.  Aortic Root: Normal aortic root.  Aortic Valve: Aortic valve leaflet morphology not well  visualized. Peaktransaortic valve gradient equals 19 mm  Hg, mean transaortic valve gradient equals 11 mm Hg,  consistent with probable mild aortic stenosis. Minimal  aortic regurgitation.  Left Atrium: Normal left atrium.  Left Ventricle: Endocardium not well visualized; grossly  normal left ventricular systolic function. Normal left  ventricular internal dimensions and wall thicknesses.  Right Heart: Normal right atrium. The right ventricle is  not well visualized; grossly normal right ventricular  systolic function. Normal tricuspid valve.  Mild-moderate  tricuspid regurgitation. Normal pulmonic valve.  Pericardium/PleuraNormal pericardium with no pericardial  effusion. Bilateral pleural effusions.  Hemodynamic: Estimated right ventricular systolic pressure  equals 57 mm Hg, assuming right atrial pressure equals 10  mm Hg, consistent with moderate pulmonary hypertension.  ------------------------------------------------------------------------  CONCLUSIONS:  1. Mitral annular calcification, otherwise normal mitral  valve. Mild mitral regurgitation.  2. Aortic valve leaflet morphology not well visualized.  Peak transaortic valve gradient equals 19 mm Hg, mean  transaortic valve gradient equals 11 mm Hg, consistent with  probable mild aortic stenosis. Minimal aortic  regurgitation.  3. Normal left ventricular internal dimensions and wall  thicknesses.  4. Endocardium not well visualized; grossly normal left  ventricular systolic function.  5. The right ventricle is not well visualized; grossly  normal right ventricular systolic function.  6. Estimated right ventricular systolic pressure equals 57  mm Hg, assuming right atrial pressure equals 10 mm Hg,  consistent with moderate pulmonary hypertension.  7. Bilateral pleural effusions.

## 2018-03-18 NOTE — PROGRESS NOTE ADULT - PROBLEM SELECTOR PROBLEM 10
Preoperative clearance

## 2018-03-18 NOTE — PROGRESS NOTE ADULT - PROBLEM SELECTOR PLAN 4
-S/p angiogram. Scheduled for fem-pop bypass that has been postponed secondary to suboptimal respiratory status.   -Consented via surrogate, Eres (patient's brother) & patient agreeable to surgery.   -L foot with cellulitis with resulting sepsis which is now improved since admission  S/p vancomycin and zosyn. Completed Unasyn on 2/22.  Podiatry has evaluated patient and report good improvement. recs appreciated.    Wound culture grew Acinetobacter, corynebacterium, staph haemolyticus and staph aureus. Blood cx with NGTD.  -LLE xrays negative for free air; CT read negative for free air. -S/p angiogram. fem-pop bypass postponed secondary to suboptimal respiratory status - hold off for now till cardiopulmonary optimization    -Consented via surrogate, Eres (patient's brother) & patient agreeable to surgery.   -L foot with cellulitis with resulting sepsis which is now improved since admission  S/p vancomycin and zosyn. Completed Unasyn on 2/22.  Podiatry has evaluated patient and report good improvement. recs appreciated.    Wound culture grew Acinetobacter, corynebacterium, staph haemolyticus and staph aureus. Blood cx with NGTD.  -LLE xrays negative for free air; CT read negative for free air.

## 2018-03-18 NOTE — CONSULT NOTE ADULT - SUBJECTIVE AND OBJECTIVE BOX
CHIEF COMPLAINT:    63-year-old male with PMH of HTN, DM, right BKA, and CKD admitted for left foot infection.  3 days PTA patient reported that 3 days ago he started noticing a blister forming on the bottom of his left foot. the blister popped and patient had bloody fluid come out and felt extreme pain. Denies any fevers, chills, nausea, abdominal pain, vomiting. States his R foot had ulcer. Denies history of smoking.   Patient denies any obstructive urinary symptoms: urinates several times per day, no dysuria, hematuria, back pain. Patient follows with his PMD for his diabetic and renal needs. Denies shortness of breath and chest pain.     Noted to have pleural effusions. Known HFpEF and CKD. On diuresis, pulm being consulted for eval of Pleffs    PAST MEDICAL & SURGICAL HISTORY:  Kidney disease  HTN (hypertension)  DM (diabetes mellitus)  S/P BKA (below knee amputation) unilateral, right      FAMILY HISTORY:  Family history of diabetes mellitus (Mother)      SOCIAL HISTORY:  ex smoker  Allergies    No Known Allergies    Intolerances        HOME MEDICATIONS:    REVIEW OF SYSTEMS:  CONSTITUTIONAL: No fever, weight loss, or fatigue  EYES: No eye pain, visual disturbances, or discharge  ENMT:  No difficulty hearing, tinnitus, vertigo; No sinus or throat pain  NECK: No pain or stiffness  RESPIRATORY: No cough, wheezing, chills or hemoptysis; No shortness of breath  CARDIOVASCULAR: No chest pain, palpitations, dizziness, or leg swelling  GASTROINTESTINAL: No abdominal or epigastric pain. No nausea, vomiting, or hematemesis; No diarrhea or constipation. No melena or hematochezia.  GENITOURINARY: No dysuria, frequency, hematuria, or incontinence  NEUROLOGICAL: No headaches, memory loss, loss of strength, numbness, or tremors  ENDOCRINE: No heat or cold intolerance; No hair loss  MUSCULOSKELETAL: No joint pain or swelling; No muscle, back, or extremity pain    OBJECTIVE:  ICU Vital Signs Last 24 Hrs  T(C): 36.7 (18 Mar 2018 13:09), Max: 36.9 (18 Mar 2018 05:10)  T(F): 98 (18 Mar 2018 13:09), Max: 98.4 (18 Mar 2018 05:10)  HR: 55 (18 Mar 2018 17:30) (55 - 69)  BP: 132/55 (18 Mar 2018 17:30) (132/55 - 169/66)  BP(mean): --  ABP: --  ABP(mean): --  RR: 19 (18 Mar 2018 13:09) (18 - 20)  SpO2: 98% (18 Mar 2018 13:09) (87% - 98%)        03-17 @ 07:01  -  03-18 @ 07:00  --------------------------------------------------------  IN: 0 mL / OUT: 950 mL / NET: -950 mL    03-18 @ 07:01 - 03-18 @ 18:37  --------------------------------------------------------  IN: 0 mL / OUT: 1325 mL / NET: -1325 mL      CAPILLARY BLOOD GLUCOSE      POCT Blood Glucose.: 128 mg/dL (18 Mar 2018 17:26)      PHYSICAL EXAM:  NAD, well-developed  HEAD:  Atraumatic, Normocephalic  EYES: EOMI, PERRLA, conjunctiva and sclera clear  NECK: Supple, No JVD  CHEST/LUNG: Poor air entry  HEART: Regular rate and rhythm; No murmurs, rubs, or gallops  ABDOMEN: Soft, Nontender, Nondistended; Bowel sounds present  NEUROLOGY: non-focal  SKIN: No rashes or lesions      HOSPITAL MEDICATIONS:  MEDICATIONS  (STANDING):  ALBUTerol/ipratropium for Nebulization 3 milliLiter(s) Nebulizer every 6 hours  aspirin enteric coated 81 milliGRAM(s) Oral daily  atorvastatin 80 milliGRAM(s) Oral at bedtime  buMETAnide Infusion 2.5 mG/Hr (25 mL/Hr) IV Continuous <Continuous>  cyanocobalamin 1000 MICROGram(s) Oral daily  dextrose 5%. 1000 milliLiter(s) (50 mL/Hr) IV Continuous <Continuous>  dextrose 50% Injectable 25 Gram(s) IV Push once  dextrose 50% Injectable 25 Gram(s) IV Push once  heparin  Injectable 5000 Unit(s) SubCutaneous every 8 hours  influenza   Vaccine 0.5 milliLiter(s) IntraMuscular once  insulin lispro (HumaLOG) corrective regimen sliding scale   SubCutaneous three times a day before meals  labetalol 200 milliGRAM(s) Oral three times a day  multivitamin 1 Tablet(s) Oral daily  NIFEdipine XL 60 milliGRAM(s) Oral daily  silver sulfADIAZINE 1% Cream 1 Application(s) Topical daily  sodium bicarbonate 650 milliGRAM(s) Oral every 12 hours    MEDICATIONS  (PRN):  acetaminophen   Tablet. 650 milliGRAM(s) Oral every 6 hours PRN Moderate Pain (4 - 6)  dextrose Gel 1 Dose(s) Oral once PRN Blood Glucose LESS THAN 70 milliGRAM(s)/deciliter  glucagon  Injectable 1 milliGRAM(s) IntraMuscular once PRN Glucose LESS THAN 70 milligrams/deciliter      LABS:                        9.7    4.74  )-----------( 263      ( 18 Mar 2018 05:49 )             31.9     03-18    142  |  104  |  54<H>  ----------------------------<  69<L>  4.7   |  24  |  4.21<H>    Ca    8.6      18 Mar 2018 05:49  Phos  4.6     03-18  Mg     1.8     03-18                MICROBIOLOGY:     RADIOLOGY:  [ ] Reviewed and interpreted by me    PULMONARY FUNCTION TESTS:    EKG:

## 2018-03-18 NOTE — PROGRESS NOTE ADULT - ASSESSMENT
63M PMHx DM, s/p R BKA, now presenting with Diabetic foot soft tissue infection, w/ angiography showing arterial stenosis of LLE, plan for LLE bypass. Or cancelled due to pulm effusions. Pt Cr improving.     - continue diet  - f/u AM labs  - f/u renal  - f/u CT chest, if has significant pulm effusions will consider consulting pulm  - medically optimize  - give IV lasix  - SQH, ASA  - Aggressively record urine output  - Wound care as per podiatry 63M PMHx DM, s/p R BKA, now presenting with Diabetic foot soft tissue infection, w/ angiography showing arterial stenosis of LLE, plan for LLE bypass. Or cancelled due to pulm effusions. Pt having worsening pulm effusions.     - continue diet  - f/u AM labs  - f/u renal  - f/u CT chest, if has significant pulm effusions will consider consulting pulm  - medically optimize  - give IV lasix  - SQH, ASA  - Aggressively record urine output  - Wound care as per podiatry

## 2018-03-18 NOTE — CONSULT NOTE ADULT - ATTENDING COMMENTS
Agree with above. Patient with CKD, DM, HTN, vasculopathy with pending LLE bypass with bilateral pleural effusions that are chronic appearing on CT chest (reviewed by me). POCUS showed bilateral effusions with left larger than right, both simple, and without any paradoxical movement or inversion/flattening of the diaphragm. These effusions are not resulting in SOB/MENSAH, and are most likely secondary to heart failure (diastolic dysfunction, pHTN). Recommend to continue with aggressive diuresis for now. If effusions increase with adverse sequelae on diaphragm function (to be assessed by POCUS) will consider thoracentesis. Will follow.
as above

## 2018-03-18 NOTE — PROGRESS NOTE ADULT - PROBLEM SELECTOR PLAN 10
-Patient is high risk for moderate risk procedure. RCRI 3; will medically optimize patient and improve volume status for surgery.     Dalton Nunez D.O.  PGY-1 EM/IM  Pager #[phone]

## 2018-03-18 NOTE — PROGRESS NOTE ADULT - PROBLEM SELECTOR PLAN 2
Likely due to pulmonary edema given acuity and CXR confirming such; possibly due to acute on chronic HFpEF in the setting of JANY & valvular dysfunction.   - Monitor I's and O's & oxygen saturation  - c/w Lasix 40 IVP BID and monitor SCr closely; net negative 2 L. Will Continue standing diuretics for now & adjust dose as necessary -Creatinine downtrending with diuresis now stable CKD V in the setting of  ATN from sepsis & LANNY.   -continuing to monitor urine outputs, trend sCr daily

## 2018-03-18 NOTE — PROGRESS NOTE ADULT - SUBJECTIVE AND OBJECTIVE BOX
Izzy Pipo. PGY2  Pager [phone] /[phone]    Patient is a 63y old  Male who presents with a chief complaint of 63M PMH DM, HTN, CKD p/w L foot pain x 3 days.    SUBJECTIVE / OVERNIGHT EVENTS:  Pt seen and examined at bedside. Continues to have baseline sob, feels dyspnea if off NC. On 3 L this am.  Patient denies fevers, chills, nausea, vomiting. Is tolerating po intake.       MEDICATIONS  (STANDING):  ALBUTerol/ipratropium for Nebulization 3 milliLiter(s) Nebulizer every 6 hours  aspirin enteric coated 81 milliGRAM(s) Oral daily  atorvastatin 80 milliGRAM(s) Oral at bedtime  cyanocobalamin 1000 MICROGram(s) Oral daily  dextrose 5%. 1000 milliLiter(s) (50 mL/Hr) IV Continuous <Continuous>  dextrose 50% Injectable 25 Gram(s) IV Push once  dextrose 50% Injectable 25 Gram(s) IV Push once  furosemide   Injectable 60 milliGRAM(s) IV Push two times a day  heparin  Injectable 5000 Unit(s) SubCutaneous every 8 hours  influenza   Vaccine 0.5 milliLiter(s) IntraMuscular once  insulin lispro (HumaLOG) corrective regimen sliding scale   SubCutaneous three times a day before meals  labetalol 200 milliGRAM(s) Oral three times a day  multivitamin 1 Tablet(s) Oral daily  NIFEdipine XL 60 milliGRAM(s) Oral daily  silver sulfADIAZINE 1% Cream 1 Application(s) Topical daily  sodium bicarbonate 650 milliGRAM(s) Oral every 12 hours    MEDICATIONS  (PRN):  acetaminophen   Tablet. 650 milliGRAM(s) Oral every 6 hours PRN Moderate Pain (4 - 6)  dextrose Gel 1 Dose(s) Oral once PRN Blood Glucose LESS THAN 70 milliGRAM(s)/deciliter  glucagon  Injectable 1 milliGRAM(s) IntraMuscular once PRN Glucose LESS THAN 70 milligrams/deciliter      CAPILLARY BLOOD GLUCOSE      POCT Blood Glucose.: 106 mg/dL (18 Mar 2018 12:22)  POCT Blood Glucose.: 74 mg/dL (18 Mar 2018 08:23)  POCT Blood Glucose.: 140 mg/dL (17 Mar 2018 23:00)  POCT Blood Glucose.: 81 mg/dL (17 Mar 2018 17:42)    I&O's Summary    17 Mar 2018 07:01  -  18 Mar 2018 07:00  --------------------------------------------------------  IN: 0 mL / OUT: 950 mL / NET: -950 mL    18 Mar 2018 07:01  -  18 Mar 2018 14:19  --------------------------------------------------------  IN: 0 mL / OUT: 1175 mL / NET: -1175 mL        PHYSICAL EXAM:  GENERAL: NAD  Head: Head atraumatic, normocephalic  ENT: EOMI, conjunctiva and sclera clear, + 4cm JVD   CHEST/LUNG: Rhonchi in the R middle and lower lobes. Wheezing bilaterally.    HEART: Regular rate and rhythm, no murmurs, rubs, or gallops  ABDOMEN: Soft, nontender, nondistended, normal bowel sounds   EXTREMITIES: AKA RLE, L foot covered in bandage. No cyanosis.   PSYCH: AAOx3      LABS:                        9.7    4.74  )-----------( 263      ( 18 Mar 2018 05:49 )             31.9     03-18    142  |  104  |  54<H>  ----------------------------<  69<L>  4.7   |  24  |  4.21<H>    Ca    8.6      18 Mar 2018 05:49  Phos  4.6     03-18  Mg     1.8     03-18                RADIOLOGY & ADDITIONAL TESTS:    Imaging Personally Reviewed:    Consultant(s) Notes Reviewed:      Care Discussed with Consultants/Other Providers: Cardiology

## 2018-03-18 NOTE — PROGRESS NOTE ADULT - PROBLEM SELECTOR PLAN 6
s/p 2 units for pre-op optimization. Hgb stable   Folate, B12, TSH all WNL.  Likely 2/2 in setting of decreased EPO production d/t worsening kidney function. Hemolysis less likely. T bili is WNL. Acute blood loss also less likely as no source. -decrease labetalol dose due to mild bradycardia  -c/w Nifedipine XL; will uptitrate if necessary

## 2018-03-18 NOTE — PROGRESS NOTE ADULT - PROBLEM SELECTOR PLAN 5
S/p angiogram. With multi vessel disease s/p LE dopplers, for vein mapping for grafts.  -For Femoral-popliteal bypass as above   C/w ASA and statin S/p angiogram. With multi vessel disease s/p LE dopplers, for vein mapping for grafts.  C/w ASA and statin

## 2018-03-18 NOTE — PROGRESS NOTE ADULT - ASSESSMENT
63yoM h/o DM, CVA in 1990s, HTN, CKD stage 3, R BKA in 2009 p/w 3 day h/o worsening left foot pain. s/p  Qagtu-zcee-ijdnmtlut angiography and Left leg angiography showing Ostial left anterior tibial:  100% stenosis. Proximal left anterior tibial: 100 % stenosis. Mid left anterior tibial: 100 % stenosis. Distal left anterior tibial: 100 % stenosis. 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 Plan for femoral popliteal bypass. Given 3 units blood transfusion now ADHF with bilateral large pleural effusions     Acute Diastolic HF  - appears volume overloaded   - d/c Lasix  - Start Bumex gtt 0.5mg/hr   - BMP BID, keep K+ 4.0 and Mag 2.0  - Monitor I&O and daily weight   - Pulm consult  for pleural eff  - Continue Bipap at night       JANY on CKD  - left leg angiogram radiocontrast nephropathy?  - Monitor BMP  - Avoid any potential nephrotoxins    Femoral popliteal bypass  - On hold for now

## 2018-03-19 LAB
B PERT DNA SPEC QL NAA+PROBE: SIGNIFICANT CHANGE UP
BUN SERPL-MCNC: 52 MG/DL — HIGH (ref 7–23)
BUN SERPL-MCNC: 53 MG/DL — HIGH (ref 7–23)
BUN SERPL-MCNC: 54 MG/DL — HIGH (ref 7–23)
C PNEUM DNA SPEC QL NAA+PROBE: NOT DETECTED — SIGNIFICANT CHANGE UP
CALCIUM SERPL-MCNC: 8.9 MG/DL — SIGNIFICANT CHANGE UP (ref 8.4–10.5)
CALCIUM SERPL-MCNC: 9.2 MG/DL — SIGNIFICANT CHANGE UP (ref 8.4–10.5)
CALCIUM SERPL-MCNC: 9.6 MG/DL — SIGNIFICANT CHANGE UP (ref 8.4–10.5)
CHLORIDE SERPL-SCNC: 100 MMOL/L — SIGNIFICANT CHANGE UP (ref 98–107)
CHLORIDE SERPL-SCNC: 101 MMOL/L — SIGNIFICANT CHANGE UP (ref 98–107)
CHLORIDE SERPL-SCNC: 98 MMOL/L — SIGNIFICANT CHANGE UP (ref 98–107)
CO2 SERPL-SCNC: 24 MMOL/L — SIGNIFICANT CHANGE UP (ref 22–31)
CO2 SERPL-SCNC: 26 MMOL/L — SIGNIFICANT CHANGE UP (ref 22–31)
CO2 SERPL-SCNC: 29 MMOL/L — SIGNIFICANT CHANGE UP (ref 22–31)
CREAT SERPL-MCNC: 4.17 MG/DL — HIGH (ref 0.5–1.3)
CREAT SERPL-MCNC: 4.17 MG/DL — HIGH (ref 0.5–1.3)
CREAT SERPL-MCNC: 4.22 MG/DL — HIGH (ref 0.5–1.3)
FLUAV H1 2009 PAND RNA SPEC QL NAA+PROBE: NOT DETECTED — SIGNIFICANT CHANGE UP
FLUAV H1 RNA SPEC QL NAA+PROBE: NOT DETECTED — SIGNIFICANT CHANGE UP
FLUAV H3 RNA SPEC QL NAA+PROBE: NOT DETECTED — SIGNIFICANT CHANGE UP
FLUAV SUBTYP SPEC NAA+PROBE: SIGNIFICANT CHANGE UP
FLUBV RNA SPEC QL NAA+PROBE: NOT DETECTED — SIGNIFICANT CHANGE UP
GLUCOSE BLDC GLUCOMTR-MCNC: 141 MG/DL — HIGH (ref 70–99)
GLUCOSE BLDC GLUCOMTR-MCNC: 175 MG/DL — HIGH (ref 70–99)
GLUCOSE BLDC GLUCOMTR-MCNC: 89 MG/DL — SIGNIFICANT CHANGE UP (ref 70–99)
GLUCOSE BLDC GLUCOMTR-MCNC: 97 MG/DL — SIGNIFICANT CHANGE UP (ref 70–99)
GLUCOSE SERPL-MCNC: 122 MG/DL — HIGH (ref 70–99)
GLUCOSE SERPL-MCNC: 129 MG/DL — HIGH (ref 70–99)
GLUCOSE SERPL-MCNC: 65 MG/DL — LOW (ref 70–99)
HADV DNA SPEC QL NAA+PROBE: NOT DETECTED — SIGNIFICANT CHANGE UP
HCOV 229E RNA SPEC QL NAA+PROBE: NOT DETECTED — SIGNIFICANT CHANGE UP
HCOV HKU1 RNA SPEC QL NAA+PROBE: NOT DETECTED — SIGNIFICANT CHANGE UP
HCOV NL63 RNA SPEC QL NAA+PROBE: NOT DETECTED — SIGNIFICANT CHANGE UP
HCOV OC43 RNA SPEC QL NAA+PROBE: NOT DETECTED — SIGNIFICANT CHANGE UP
HCT VFR BLD CALC: 35.3 % — LOW (ref 39–50)
HGB BLD-MCNC: 10.9 G/DL — LOW (ref 13–17)
HMPV RNA SPEC QL NAA+PROBE: NOT DETECTED — SIGNIFICANT CHANGE UP
HPIV1 RNA SPEC QL NAA+PROBE: NOT DETECTED — SIGNIFICANT CHANGE UP
HPIV2 RNA SPEC QL NAA+PROBE: NOT DETECTED — SIGNIFICANT CHANGE UP
HPIV3 RNA SPEC QL NAA+PROBE: NOT DETECTED — SIGNIFICANT CHANGE UP
HPIV4 RNA SPEC QL NAA+PROBE: NOT DETECTED — SIGNIFICANT CHANGE UP
M PNEUMO DNA SPEC QL NAA+PROBE: NOT DETECTED — SIGNIFICANT CHANGE UP
MAGNESIUM SERPL-MCNC: 2 MG/DL — SIGNIFICANT CHANGE UP (ref 1.6–2.6)
MAGNESIUM SERPL-MCNC: 2.1 MG/DL — SIGNIFICANT CHANGE UP (ref 1.6–2.6)
MCHC RBC-ENTMCNC: 26.2 PG — LOW (ref 27–34)
MCHC RBC-ENTMCNC: 30.9 % — LOW (ref 32–36)
MCV RBC AUTO: 84.9 FL — SIGNIFICANT CHANGE UP (ref 80–100)
NRBC # FLD: 0 — SIGNIFICANT CHANGE UP
PHOSPHATE SERPL-MCNC: 4.5 MG/DL — SIGNIFICANT CHANGE UP (ref 2.5–4.5)
PHOSPHATE SERPL-MCNC: 4.8 MG/DL — HIGH (ref 2.5–4.5)
PLATELET # BLD AUTO: 231 K/UL — SIGNIFICANT CHANGE UP (ref 150–400)
PMV BLD: 11.9 FL — SIGNIFICANT CHANGE UP (ref 7–13)
POTASSIUM SERPL-MCNC: 4.4 MMOL/L — SIGNIFICANT CHANGE UP (ref 3.5–5.3)
POTASSIUM SERPL-MCNC: 5 MMOL/L — SIGNIFICANT CHANGE UP (ref 3.5–5.3)
POTASSIUM SERPL-MCNC: 5.3 MMOL/L — SIGNIFICANT CHANGE UP (ref 3.5–5.3)
POTASSIUM SERPL-SCNC: 4.4 MMOL/L — SIGNIFICANT CHANGE UP (ref 3.5–5.3)
POTASSIUM SERPL-SCNC: 5 MMOL/L — SIGNIFICANT CHANGE UP (ref 3.5–5.3)
POTASSIUM SERPL-SCNC: 5.3 MMOL/L — SIGNIFICANT CHANGE UP (ref 3.5–5.3)
RBC # BLD: 4.16 M/UL — LOW (ref 4.2–5.8)
RBC # FLD: 14.5 % — SIGNIFICANT CHANGE UP (ref 10.3–14.5)
RSV RNA SPEC QL NAA+PROBE: NOT DETECTED — SIGNIFICANT CHANGE UP
RV+EV RNA SPEC QL NAA+PROBE: NOT DETECTED — SIGNIFICANT CHANGE UP
SODIUM SERPL-SCNC: 141 MMOL/L — SIGNIFICANT CHANGE UP (ref 135–145)
SODIUM SERPL-SCNC: 142 MMOL/L — SIGNIFICANT CHANGE UP (ref 135–145)
SODIUM SERPL-SCNC: 142 MMOL/L — SIGNIFICANT CHANGE UP (ref 135–145)
WBC # BLD: 4.66 K/UL — SIGNIFICANT CHANGE UP (ref 3.8–10.5)
WBC # FLD AUTO: 4.66 K/UL — SIGNIFICANT CHANGE UP (ref 3.8–10.5)

## 2018-03-19 PROCEDURE — 99231 SBSQ HOSP IP/OBS SF/LOW 25: CPT

## 2018-03-19 PROCEDURE — 99233 SBSQ HOSP IP/OBS HIGH 50: CPT | Mod: GC

## 2018-03-19 PROCEDURE — 71046 X-RAY EXAM CHEST 2 VIEWS: CPT | Mod: 26

## 2018-03-19 RX ORDER — ACETAMINOPHEN 500 MG
1000 TABLET ORAL ONCE
Refills: 0 | Status: COMPLETED | OUTPATIENT
Start: 2018-03-19 | End: 2018-03-19

## 2018-03-19 RX ORDER — NIFEDIPINE 30 MG
90 TABLET, EXTENDED RELEASE 24 HR ORAL DAILY
Refills: 0 | Status: DISCONTINUED | OUTPATIENT
Start: 2018-03-19 | End: 2018-03-19

## 2018-03-19 RX ORDER — NIFEDIPINE 30 MG
90 TABLET, EXTENDED RELEASE 24 HR ORAL DAILY
Refills: 0 | Status: DISCONTINUED | OUTPATIENT
Start: 2018-03-19 | End: 2018-04-13

## 2018-03-19 RX ORDER — BENZOCAINE AND MENTHOL 5; 1 G/100ML; G/100ML
1 LIQUID ORAL
Refills: 0 | Status: DISCONTINUED | OUTPATIENT
Start: 2018-03-19 | End: 2018-04-09

## 2018-03-19 RX ADMIN — Medication 1 TABLET(S): at 12:38

## 2018-03-19 RX ADMIN — Medication 60 MILLIGRAM(S): at 05:27

## 2018-03-19 RX ADMIN — Medication 200 MILLIGRAM(S): at 05:27

## 2018-03-19 RX ADMIN — PREGABALIN 1000 MICROGRAM(S): 225 CAPSULE ORAL at 12:38

## 2018-03-19 RX ADMIN — Medication 650 MILLIGRAM(S): at 05:27

## 2018-03-19 RX ADMIN — HEPARIN SODIUM 5000 UNIT(S): 5000 INJECTION INTRAVENOUS; SUBCUTANEOUS at 21:57

## 2018-03-19 RX ADMIN — Medication 3 MILLILITER(S): at 16:09

## 2018-03-19 RX ADMIN — Medication 3 MILLILITER(S): at 10:11

## 2018-03-19 RX ADMIN — BENZOCAINE AND MENTHOL 1 LOZENGE: 5; 1 LIQUID ORAL at 17:20

## 2018-03-19 RX ADMIN — Medication 3 MILLILITER(S): at 22:32

## 2018-03-19 RX ADMIN — Medication 200 MILLIGRAM(S): at 21:57

## 2018-03-19 RX ADMIN — HEPARIN SODIUM 5000 UNIT(S): 5000 INJECTION INTRAVENOUS; SUBCUTANEOUS at 05:27

## 2018-03-19 RX ADMIN — Medication 400 MILLIGRAM(S): at 02:16

## 2018-03-19 RX ADMIN — Medication 200 MILLIGRAM(S): at 13:41

## 2018-03-19 RX ADMIN — BUMETANIDE 25 MG/HR: 0.25 INJECTION INTRAMUSCULAR; INTRAVENOUS at 17:15

## 2018-03-19 RX ADMIN — HEPARIN SODIUM 5000 UNIT(S): 5000 INJECTION INTRAVENOUS; SUBCUTANEOUS at 14:15

## 2018-03-19 RX ADMIN — Medication 1 APPLICATION(S): at 12:38

## 2018-03-19 RX ADMIN — ATORVASTATIN CALCIUM 80 MILLIGRAM(S): 80 TABLET, FILM COATED ORAL at 21:57

## 2018-03-19 RX ADMIN — Medication 650 MILLIGRAM(S): at 17:16

## 2018-03-19 RX ADMIN — Medication 81 MILLIGRAM(S): at 12:38

## 2018-03-19 RX ADMIN — Medication 1000 MILLIGRAM(S): at 02:30

## 2018-03-19 NOTE — PROGRESS NOTE ADULT - PROBLEM SELECTOR PLAN 4
-S/p angiogram. fem-pop bypass postponed secondary to suboptimal respiratory status - hold off for now till cardiopulmonary optimization    -Consented via surrogate, Eres (patient's brother) & patient agreeable to surgery.   -L foot with cellulitis with resulting sepsis which is now improved since admission  S/p vancomycin and zosyn. Completed Unasyn on 2/22.  Podiatry has evaluated patient and report good improvement. recs appreciated.    Wound culture grew Acinetobacter, corynebacterium, staph haemolyticus and staph aureus. Blood cx with NGTD.  -LLE xrays negative for free air; CT read negative for free air.

## 2018-03-19 NOTE — PROGRESS NOTE ADULT - SUBJECTIVE AND OBJECTIVE BOX
Patient is a 63y old  Male who presents with a chief complaint of 63M PMH DM, HTN, CKD p/w L foot pain x 3 days. (19 Feb 2018 10:39)      SUBJECTIVE / OVERNIGHT EVENTS: Patient started on Bumex ggt. States he has muscle pains/cramps this AM. On RA. -2.6 L    MEDICATIONS  (STANDING):  ALBUTerol/ipratropium for Nebulization 3 milliLiter(s) Nebulizer every 6 hours  aspirin enteric coated 81 milliGRAM(s) Oral daily  atorvastatin 80 milliGRAM(s) Oral at bedtime  buMETAnide Infusion 2.5 mG/Hr (25 mL/Hr) IV Continuous <Continuous>  cyanocobalamin 1000 MICROGram(s) Oral daily  dextrose 5%. 1000 milliLiter(s) (50 mL/Hr) IV Continuous <Continuous>  dextrose 50% Injectable 25 Gram(s) IV Push once  dextrose 50% Injectable 25 Gram(s) IV Push once  heparin  Injectable 5000 Unit(s) SubCutaneous every 8 hours  influenza   Vaccine 0.5 milliLiter(s) IntraMuscular once  insulin lispro (HumaLOG) corrective regimen sliding scale   SubCutaneous three times a day before meals  labetalol 200 milliGRAM(s) Oral three times a day  multivitamin 1 Tablet(s) Oral daily  NIFEdipine XL 90 milliGRAM(s) Oral daily  silver sulfADIAZINE 1% Cream 1 Application(s) Topical daily  sodium bicarbonate 650 milliGRAM(s) Oral every 12 hours    MEDICATIONS  (PRN):  acetaminophen   Tablet. 650 milliGRAM(s) Oral every 6 hours PRN Moderate Pain (4 - 6)  dextrose Gel 1 Dose(s) Oral once PRN Blood Glucose LESS THAN 70 milliGRAM(s)/deciliter  glucagon  Injectable 1 milliGRAM(s) IntraMuscular once PRN Glucose LESS THAN 70 milligrams/deciliter        CAPILLARY BLOOD GLUCOSE      POCT Blood Glucose.: 89 mg/dL (19 Mar 2018 08:15)  POCT Blood Glucose.: 104 mg/dL (18 Mar 2018 22:17)  POCT Blood Glucose.: 128 mg/dL (18 Mar 2018 17:26)  POCT Blood Glucose.: 106 mg/dL (18 Mar 2018 12:22)  POCT Blood Glucose.: 74 mg/dL (18 Mar 2018 08:23)    I&O's Summary    18 Mar 2018 07:01  -  19 Mar 2018 07:00  --------------------------------------------------------  IN: 0 mL / OUT: 2625 mL / NET: -2625 mL        PHYSICAL EXAM:  GENERAL: no acute respiratory distress  HEAD:  Atraumatic, Normocephalic  EYES: EOMI, PERRLA, conjunctiva and sclera clear  NECK: Supple, No JVD  CHEST/LUNG: Decreased breath sounds bases bilaterally; No wheeze  HEART: Regular rate and rhythm; No murmurs, rubs, or gallops  ABDOMEN: Soft, Nontender, Nondistended; Bowel sounds present  EXTREMITIES:  2+ Peripheral Pulses, 1+ pitting edema LLE; unable to palpate DP pulses  PSYCH: AAOx3  NEUROLOGY: non-focal  SKIN: No rashes or lesions    LABS:  (03-19 @ 06:21)                        10.9  4.66 )-----------( 231                 35.3    Neutrophils = -- (--%)  Lymphocytes = -- (--%)  Eosinophils = -- (--%)  Basophils = -- (--%)  Monocytes = -- (--%)  Bands = --%    WBC Trend: 4.66<--, 4.74<--, 4.13<--  Hb Trend: 10.9<--, 9.7<--, 9.6<--, 9.8<--, 10.2<--  Plt Trend: 231<--, 263<--, 238<--, 235<--, 241<--  03-18    142  |  104  |  54<H>  ----------------------------<  69<L>  4.7   |  24  |  4.21<H>    Ca    8.6      18 Mar 2018 05:49  Phos  4.6     03-18  Mg     1.8     03-18      Creatinine Trend: 4.21<--, 4.09<--, 4.17<--, 4.15<--, 4.34<--, 4.40<--    POCUS Lungs:     Consultant(s) Notes Reviewed:  Podiatry, Vascular, Cardiology Patient is a 63y old  Male who presents with a chief complaint of 63M PMH DM, HTN, CKD p/w L foot pain x 3 days. (19 Feb 2018 10:39)      SUBJECTIVE / OVERNIGHT EVENTS: Patient started on Bumex ggt. States he has muscle pains/cramps this AM. On RA. -2.6 L    MEDICATIONS  (STANDING):  ALBUTerol/ipratropium for Nebulization 3 milliLiter(s) Nebulizer every 6 hours  aspirin enteric coated 81 milliGRAM(s) Oral daily  atorvastatin 80 milliGRAM(s) Oral at bedtime  buMETAnide Infusion 2.5 mG/Hr (25 mL/Hr) IV Continuous <Continuous>  cyanocobalamin 1000 MICROGram(s) Oral daily  dextrose 5%. 1000 milliLiter(s) (50 mL/Hr) IV Continuous <Continuous>  dextrose 50% Injectable 25 Gram(s) IV Push once  dextrose 50% Injectable 25 Gram(s) IV Push once  heparin  Injectable 5000 Unit(s) SubCutaneous every 8 hours  influenza   Vaccine 0.5 milliLiter(s) IntraMuscular once  insulin lispro (HumaLOG) corrective regimen sliding scale   SubCutaneous three times a day before meals  labetalol 200 milliGRAM(s) Oral three times a day  multivitamin 1 Tablet(s) Oral daily  NIFEdipine XL 90 milliGRAM(s) Oral daily  silver sulfADIAZINE 1% Cream 1 Application(s) Topical daily  sodium bicarbonate 650 milliGRAM(s) Oral every 12 hours    MEDICATIONS  (PRN):  acetaminophen   Tablet. 650 milliGRAM(s) Oral every 6 hours PRN Moderate Pain (4 - 6)  dextrose Gel 1 Dose(s) Oral once PRN Blood Glucose LESS THAN 70 milliGRAM(s)/deciliter  glucagon  Injectable 1 milliGRAM(s) IntraMuscular once PRN Glucose LESS THAN 70 milligrams/deciliter        CAPILLARY BLOOD GLUCOSE      POCT Blood Glucose.: 89 mg/dL (19 Mar 2018 08:15)  POCT Blood Glucose.: 104 mg/dL (18 Mar 2018 22:17)  POCT Blood Glucose.: 128 mg/dL (18 Mar 2018 17:26)  POCT Blood Glucose.: 106 mg/dL (18 Mar 2018 12:22)  POCT Blood Glucose.: 74 mg/dL (18 Mar 2018 08:23)    I&O's Summary    18 Mar 2018 07:01  -  19 Mar 2018 07:00  --------------------------------------------------------  IN: 0 mL / OUT: 2625 mL / NET: -2625 mL        PHYSICAL EXAM:  GENERAL: no acute respiratory distress  HEAD:  Atraumatic, Normocephalic  EYES: EOMI, PERRLA, conjunctiva and sclera clear  NECK: Supple, No JVD  CHEST/LUNG: Decreased breath sounds bases bilaterally; No wheeze  HEART: Regular rate and rhythm; No murmurs, rubs, or gallops  ABDOMEN: Soft, Nontender, Nondistended; Bowel sounds present  EXTREMITIES:  2+ Peripheral Pulses, 1+ pitting edema LLE; unable to palpate DP pulses  PSYCH: AAOx3  NEUROLOGY: non-focal  SKIN: No rashes or lesions    LABS:  (03-19 @ 06:21)                        10.9  4.66 )-----------( 231                 35.3    Neutrophils = -- (--%)  Lymphocytes = -- (--%)  Eosinophils = -- (--%)  Basophils = -- (--%)  Monocytes = -- (--%)  Bands = --%    WBC Trend: 4.66<--, 4.74<--, 4.13<--  Hb Trend: 10.9<--, 9.7<--, 9.6<--, 9.8<--, 10.2<--  Plt Trend: 231<--, 263<--, 238<--, 235<--, 241<--  03-18    142  |  104  |  54<H>  ----------------------------<  69<L>  4.7   |  24  |  4.21<H>    Ca    8.6      18 Mar 2018 05:49  Phos  4.6     03-18  Mg     1.8     03-18      Creatinine Trend: 4.21<--, 4.09<--, 4.17<--, 4.15<--, 4.34<--, 4.40<--    POCUS Lungs: B-lines left hemithorax; scattered on right; Moderated PLEF on left; small on right.     Consultant(s) Notes Reviewed:  Podiatry, Vascular, Cardiology

## 2018-03-19 NOTE — PROGRESS NOTE ADULT - PROBLEM SELECTOR PLAN 5
S/p angiogram. With multi vessel disease s/p LE dopplers, for vein mapping for grafts.  C/w ASA and statin

## 2018-03-19 NOTE — PROGRESS NOTE ADULT - PROBLEM SELECTOR PLAN 1
Pt. with JANY on CKD in the setting of infection and diarrhea. CKD in the setting of long-standing DM. Scr was 1.5 in 3/2017, increased to 2.30 in 7/2017. Scr on admission (2/12) was elevated at 3.81, increased to 6.3 on 2/23 and has now improved. Pt. also underwent vascular study/left leg angiogram on 3/8. Scr stable at 4.1 today. Pt. at increased risk for radiocontrast nephropathy. Monitor BMP and urine output. Avoid any potential nephrotoxins.

## 2018-03-19 NOTE — PROGRESS NOTE ADULT - SUBJECTIVE AND OBJECTIVE BOX
[facility] DIVISION OF KIDNEY DISEASES AND HYPERTENSION -- FOLLOW UP NOTE  --------------------------------------------------------------------------------    HPI: 63-year-old male with PMH of HTN, DM, right BKA, and CKD admitted for left foot infection. As per review of labs on [facility], Scr was 1.51 in 3/2017. As per PMD's office, Scr checked in 7/2017 was 2.30. Labs on admission (2/12) showed elevated Scr of 3.8 which peaked to 6.3 on 2/23 and is stable at 4.44 today. Pt. underwent vascular study/left leg angiogram on 3/8. Patient seen and examined today. Pt experienced SOB which has now resolved with initiation of  IV diuretic therapy. Pt feels well however has muslce pain and feels weak today. Comfortable off nasal cannula at bedside. Pt. denies SOB, CP or N/V.     PAST HISTORY  --------------------------------------------------------------------------------  No significant changes to PMH, PSH, FHx, SHx, unless otherwise noted    ALLERGIES & MEDICATIONS  --------------------------------------------------------------------------------  Allergies    No Known Allergies    Intolerances      Standing Inpatient Medications  ALBUTerol/ipratropium for Nebulization 3 milliLiter(s) Nebulizer every 6 hours  aspirin enteric coated 81 milliGRAM(s) Oral daily  atorvastatin 80 milliGRAM(s) Oral at bedtime  buMETAnide Infusion 2.5 mG/Hr IV Continuous <Continuous>  cyanocobalamin 1000 MICROGram(s) Oral daily  dextrose 5%. 1000 milliLiter(s) IV Continuous <Continuous>  dextrose 50% Injectable 25 Gram(s) IV Push once  dextrose 50% Injectable 25 Gram(s) IV Push once  heparin  Injectable 5000 Unit(s) SubCutaneous every 8 hours  influenza   Vaccine 0.5 milliLiter(s) IntraMuscular once  insulin lispro (HumaLOG) corrective regimen sliding scale   SubCutaneous three times a day before meals  labetalol 200 milliGRAM(s) Oral three times a day  multivitamin 1 Tablet(s) Oral daily  NIFEdipine XL 90 milliGRAM(s) Oral daily  silver sulfADIAZINE 1% Cream 1 Application(s) Topical daily  sodium bicarbonate 650 milliGRAM(s) Oral every 12 hours    PRN Inpatient Medications  acetaminophen   Tablet. 650 milliGRAM(s) Oral every 6 hours PRN  dextrose Gel 1 Dose(s) Oral once PRN  glucagon  Injectable 1 milliGRAM(s) IntraMuscular once PRN      REVIEW OF SYSTEMS  --------------------------------------------------------------------------------  Gen: +weakness  Skin: No rashes  Head/Eyes/Ears/Mouth: No headache  Respiratory: No dyspnea  CV: No chest pain, PND, orthopnea  GI: No abdominal pain, diarrhea  : No increased frequency  MSK: No edema  Neuro: No dizziness/lightheadedness  Heme: No bleeding    All other systems were reviewed and are negative, except as noted.    VITALS/PHYSICAL EXAM  --------------------------------------------------------------------------------  T(C): 36.8 (03-19-18 @ 12:24), Max: 36.8 (03-18-18 @ 20:43)  HR: 70 (03-19-18 @ 12:24) (55 - 70)  BP: 155/66 (03-19-18 @ 12:24) (132/55 - 167/69)  RR: 17 (03-19-18 @ 12:24) (17 - 19)  SpO2: 94% (03-19-18 @ 12:24) (94% - 97%)  Wt(kg): --        03-18-18 @ 07:01  -  03-19-18 @ 07:00  --------------------------------------------------------  IN: 0 mL / OUT: 2625 mL / NET: -2625 mL    03-19-18 @ 07:01  -  03-19-18 @ 13:39  --------------------------------------------------------  IN: 0 mL / OUT: 550 mL / NET: -550 mL    Physical Exam:  	Gen: NAD, well-appearing  	Pulm: +rales B/L  	CV: normal S1S2; no rub  	Abd: soft  	: No cervantes  	LE: no edema  	Skin: Warm, without rashes    LABS/STUDIES  --------------------------------------------------------------------------------              10.9   4.66  >-----------<  231      [03-19-18 @ 06:21]              35.3     142  |  100  |  52  ----------------------------<  122      [03-19-18 @ 10:00]  4.4   |  29  |  4.17        Ca     9.2     [03-19-18 @ 10:00]      Mg     2.0     [03-19-18 @ 10:00]      Phos  4.5     [03-19-18 @ 10:00]    Creatinine Trend:  SCr 4.17 [03-19 @ 10:00]  SCr 4.17 [03-19 @ 06:21]  SCr 4.21 [03-18 @ 05:49]  SCr 4.09 [03-17 @ 06:45]  SCr 4.17 [03-16 @ 17:52]    Urinalysis - [02-25-18 @ 04:50]      Color PLYEL / Appearance CLEAR / SG 1.015 / pH 5.5      Gluc NEGATIVE / Ketone NEGATIVE  / Bili NEGATIVE / Urobili NORMAL       Blood TRACE / Protein 100 / Leuk Est TRACE / Nitrite NEGATIVE      RBC 0-2 / WBC 2-5 / Hyaline  / Gran  / Sq Epi OCC / Non Sq Epi  / Bacteria     Iron 21, TIBC 163, %sat --      [02-20-18 @ 05:00]  Ferritin 540.7      [02-20-18 @ 05:00]  HbA1c 5.9      [02-13-18 @ 07:11]  TSH 3.22      [03-08-18 @ 06:00]    Free Light Chains: kappa 20.50, lambda 11.20, ratio = 1.83 H      [02-25 @ 07:43]

## 2018-03-19 NOTE — PROGRESS NOTE ADULT - ASSESSMENT
63M PMHx DM, s/p R BKA, now presenting with Diabetic foot soft tissue infection, w/ angiography showing arterial stenosis of LLE, plan for LLE bypass. Or cancelled due to pulm effusions. CT showing pulm effusions, pulm consulted, pt started on Bumex drip    - continue diet  - f/u CXR  - f/u renal, f/u pulm  - Bumex drip  - possible thoracentesis to drain pulmonary effusions if respiratory status worsens  - medically optimize  - SQH, ASA  - Aggressively record urine output  - Wound care as per podiatry

## 2018-03-19 NOTE — PROGRESS NOTE ADULT - ATTENDING COMMENTS
Pt was seen and examined by me.  Case d/w Dr. Nunez and I agree with findings and plan as detailed per above.    This is a 63 M PMH of DM, HTN, CKD, Rt BKA in 2009 who p/w sepsis 2/2 cellulitis and LLE SFA disease, c/b ATN, planned for Lt fem-pop bypass but canceled due to acute hypoxic respiratory failure 2/2 fluid overload, likely 2/2 acute diastolic CHF.    # Hypoxic respiratory failure/Acute Diastolic HF  likely secondary to volume overload  -bilateral pleural effusions  -being diuresed with bumex gtt now  -monitor I/Os  -CHF team f/u  -monitoring electrolytes    # JANY likely ATN on underlying CKD  -monitoring creat  -Avoid nephrotoxins. Monitor I& O, monitor BMP  -renal f/u appreciated

## 2018-03-19 NOTE — CHART NOTE - NSCHARTNOTEFT_GEN_A_CORE
NUTRITION FOLLOW-UP:  Pt. reports fair appetite.  C/o swallowing difficulty with both food and liquids.  Offered & described softer diet consistencies, which Pt. is amenable to (mechanical soft).  Also suggested Nepro to help meet nutrient needs... Pt. willing to consume 1x daily.  Encouraged intake of Prosource.  Denies nausea/vomiting/diarrhea/constipation at this time.  Weight status variable, although this is perhaps somewhat fluid related (Pt. currently being diuresed with Bumex).        Weight:  [72.8kg] on 3/19/18 (obtained by RDN via bed scale)               [78kg] on 3/17/18               [79.9kg] on 3/16/18.    Edema: 1+ pitting.    Skin: Left DM foot ulcer.      Pertinent Medications: MEDICATIONS  (STANDING):  ALBUTerol/ipratropium for Nebulization 3 milliLiter(s) Nebulizer every 6 hours  aspirin enteric coated 81 milliGRAM(s) Oral daily  atorvastatin 80 milliGRAM(s) Oral at bedtime  buMETAnide Infusion 2.5 mG/Hr (25 mL/Hr) IV Continuous <Continuous>  cyanocobalamin 1000 MICROGram(s) Oral daily  dextrose 5%. 1000 milliLiter(s) (50 mL/Hr) IV Continuous <Continuous>  dextrose 50% Injectable 25 Gram(s) IV Push once  dextrose 50% Injectable 25 Gram(s) IV Push once  heparin  Injectable 5000 Unit(s) SubCutaneous every 8 hours  influenza   Vaccine 0.5 milliLiter(s) IntraMuscular once  insulin lispro (HumaLOG) corrective regimen sliding scale   SubCutaneous three times a day before meals  labetalol 200 milliGRAM(s) Oral three times a day  multivitamin 1 Tablet(s) Oral daily  NIFEdipine XL 90 milliGRAM(s) Oral daily  silver sulfADIAZINE 1% Cream 1 Application(s) Topical daily  sodium bicarbonate 650 milliGRAM(s) Oral every 12 hours    MEDICATIONS  (PRN):  acetaminophen   Tablet. 650 milliGRAM(s) Oral every 6 hours PRN Moderate Pain (4 - 6)  dextrose Gel 1 Dose(s) Oral once PRN Blood Glucose LESS THAN 70 milliGRAM(s)/deciliter  glucagon  Injectable 1 milliGRAM(s) IntraMuscular once PRN Glucose LESS THAN 70 milligrams/deciliter    Pertinent Labs:  03-19 Na142 mmol/L Glu 122 mg/dL<H> K+ 4.4 mmol/L Cr  4.17 mg/dL<H> BUN 52 mg/dL<H> 03-19 Phos 4.5 mg/dL    CAPILLARY BLOOD GLUCOSE      POCT Blood Glucose.: 97 mg/dL (19 Mar 2018 12:21)  POCT Blood Glucose.: 89 mg/dL (19 Mar 2018 08:15)  POCT Blood Glucose.: 104 mg/dL (18 Mar 2018 22:17)  POCT Blood Glucose.: 128 mg/dL (18 Mar 2018 17:26)        Current Diet:  Consistent Carbohydrate, No Concentrated Potassium or phosphorus, Low Sodium + Prosource 1x daily (30mL)          PLAN/RECOMMENDATIONS:    1) Change diet consistency to mechanical soft, & add Nepro 8oz PO 1x daily to current diet order.  2) Obtain daily weights.  3) RDN remains available and will f/u PRN.          Indy Matthew RDN, CDN pager [phone] NUTRITION FOLLOW-UP:  Pt. reports fair appetite.  C/o swallowing difficulty with both food and liquids.  Offered & described softer diet consistencies, which Pt. is amenable to (mechanical soft).  Also suggested Nepro to help meet nutrient needs... Pt. willing to consume 1x daily.  Encouraged intake of Prosource.  Denies nausea/vomiting/diarrhea/constipation at this time.  Weight status variable, although this is perhaps somewhat fluid related (Pt. currently being diuresed with Bumex).        Weight:  [72.8kg] on 3/19/18 (obtained by RDN via bed scale)               [78kg] on 3/17/18               [79.9kg] on 3/16/18.    Edema: 1+ pitting.    Skin: Left DM foot ulcer.      Pertinent Medications: MEDICATIONS  (STANDING):  ALBUTerol/ipratropium for Nebulization 3 milliLiter(s) Nebulizer every 6 hours  aspirin enteric coated 81 milliGRAM(s) Oral daily  atorvastatin 80 milliGRAM(s) Oral at bedtime  buMETAnide Infusion 2.5 mG/Hr (25 mL/Hr) IV Continuous <Continuous>  cyanocobalamin 1000 MICROGram(s) Oral daily  dextrose 5%. 1000 milliLiter(s) (50 mL/Hr) IV Continuous <Continuous>  dextrose 50% Injectable 25 Gram(s) IV Push once  dextrose 50% Injectable 25 Gram(s) IV Push once  heparin  Injectable 5000 Unit(s) SubCutaneous every 8 hours  influenza   Vaccine 0.5 milliLiter(s) IntraMuscular once  insulin lispro (HumaLOG) corrective regimen sliding scale   SubCutaneous three times a day before meals  labetalol 200 milliGRAM(s) Oral three times a day  multivitamin 1 Tablet(s) Oral daily  NIFEdipine XL 90 milliGRAM(s) Oral daily  silver sulfADIAZINE 1% Cream 1 Application(s) Topical daily  sodium bicarbonate 650 milliGRAM(s) Oral every 12 hours    MEDICATIONS  (PRN):  acetaminophen   Tablet. 650 milliGRAM(s) Oral every 6 hours PRN Moderate Pain (4 - 6)  dextrose Gel 1 Dose(s) Oral once PRN Blood Glucose LESS THAN 70 milliGRAM(s)/deciliter  glucagon  Injectable 1 milliGRAM(s) IntraMuscular once PRN Glucose LESS THAN 70 milligrams/deciliter    Pertinent Labs:  03-19 Na142 mmol/L Glu 122 mg/dL<H> K+ 4.4 mmol/L Cr  4.17 mg/dL<H> BUN 52 mg/dL<H> 03-19 Phos 4.5 mg/dL    CAPILLARY BLOOD GLUCOSE      POCT Blood Glucose.: 97 mg/dL (19 Mar 2018 12:21)  POCT Blood Glucose.: 89 mg/dL (19 Mar 2018 08:15)  POCT Blood Glucose.: 104 mg/dL (18 Mar 2018 22:17)  POCT Blood Glucose.: 128 mg/dL (18 Mar 2018 17:26)        Current Diet:  Consistent Carbohydrate, No Concentrated Potassium or phosphorus, Low Sodium + Prosource 1x daily (30mL)          PLAN/RECOMMENDATIONS:    1) Change diet consistency to mechanical soft, & add Nepro 8oz PO 1x daily to current diet order.  2) Obtain daily weights.  3) If Pt. continues to c/o swallowing issues after diet consistency change, consider swallow evaluation.  4) RDN remains available and will f/u PRN.          Indy Matthew RDN, CDN pager [phone]

## 2018-03-19 NOTE — PROGRESS NOTE ADULT - PROBLEM SELECTOR PLAN 2
Pt with hypervolemia in the setting of CKD. Pt. responsive to IV diuretic therapy. Recommend to continue at this time. Pt clinically stable today. Labs reviewed. No acute indication for renal replacement therapy at this time. Monitor weight daily.

## 2018-03-19 NOTE — PROGRESS NOTE ADULT - PROBLEM SELECTOR PLAN 1
Pulmonary edema + significant pleural effusions bilaterally despite being on IV lasix   Continue Bumex ggt; concern for hypokalemia given muscle aches; BMP q 12 while on Bumex  Monitor I's and O's & daily weight

## 2018-03-19 NOTE — PROGRESS NOTE ADULT - PROBLEM SELECTOR PLAN 8
-DVT ppx: HSQ  -Diabetic Diet    Disposition: possible ECTOR to follow for outpatient procedure pending surgeon accepting patient's insurance as outpatient once medically optimized    Dalton Nunez D.O.  PGY-1 EM/IM  Pager #[phone]

## 2018-03-19 NOTE — PROGRESS NOTE ADULT - PROBLEM SELECTOR PLAN 2
-Creatinine downtrending with diuresis now stable CKD V in the setting of  ATN from sepsis & LANNY.   -continuing to monitor urine outputs, trend sCr daily -Creatinine stable CKD IV in the setting of  ATN from sepsis & LANNY.   -continuing to monitor urine outputs, trend sCr daily

## 2018-03-19 NOTE — PROGRESS NOTE ADULT - SUBJECTIVE AND OBJECTIVE BOX
C Team Vascular Surgery Progress Note (p [id number])    SUBJECTIVE:  The patient was seen and examined this morning during rounds. No acute events overnight. CT showed large bilateral pleural effusions. Has been on Bumex drip with 2.6L urine output. This morning patient said he was tolerating room air well.     OBJECTIVE:     ** VITAL SIGNS / I&O's **    Vital Signs Last 24 Hrs  T(C): 36.4 (19 Mar 2018 04:58), Max: 36.8 (18 Mar 2018 20:43)  T(F): 97.5 (19 Mar 2018 04:58), Max: 98.2 (18 Mar 2018 20:43)  HR: 62 (19 Mar 2018 04:58) (55 - 70)  BP: 165/68 (19 Mar 2018 04:58) (132/55 - 167/69)  BP(mean): --  RR: 18 (19 Mar 2018 04:58) (18 - 19)  SpO2: 97% (19 Mar 2018 04:58) (87% - 98%)      18 Mar 2018 07:01  -  19 Mar 2018 07:00  --------------------------------------------------------  IN:  Total IN: 0 mL    OUT:    Voided: 2625 mL  Total OUT: 2625 mL    Total NET: -2625 mL          ** PHYSICAL EXAM **    Gen: Alert, NAD, off supplemental oxygen  Pulm: non-labored breathing, airway patent  Ext: LLE w/ kerlex dressing in place; s/p R BKA, healed left toe wound stable w eschar      ** LABS **                          10.9   4.66  )-----------( 231      ( 19 Mar 2018 06:21 )             35.3     19 Mar 2018 06:21    142    |  101    |  53     ----------------------------<  65     5.3     |  24     |  4.17     Ca    8.9        19 Mar 2018 06:21  Phos  4.8       19 Mar 2018 06:21  Mg     2.1       19 Mar 2018 06:21        CAPILLARY BLOOD GLUCOSE      POCT Blood Glucose.: 89 mg/dL (19 Mar 2018 08:15)  POCT Blood Glucose.: 104 mg/dL (18 Mar 2018 22:17)  POCT Blood Glucose.: 128 mg/dL (18 Mar 2018 17:26)  POCT Blood Glucose.: 106 mg/dL (18 Mar 2018 12:22)              MEDICATIONS  (STANDING):  ALBUTerol/ipratropium for Nebulization 3 milliLiter(s) Nebulizer every 6 hours  aspirin enteric coated 81 milliGRAM(s) Oral daily  atorvastatin 80 milliGRAM(s) Oral at bedtime  buMETAnide Infusion 2.5 mG/Hr (25 mL/Hr) IV Continuous <Continuous>  cyanocobalamin 1000 MICROGram(s) Oral daily  dextrose 5%. 1000 milliLiter(s) (50 mL/Hr) IV Continuous <Continuous>  dextrose 50% Injectable 25 Gram(s) IV Push once  dextrose 50% Injectable 25 Gram(s) IV Push once  heparin  Injectable 5000 Unit(s) SubCutaneous every 8 hours  influenza   Vaccine 0.5 milliLiter(s) IntraMuscular once  insulin lispro (HumaLOG) corrective regimen sliding scale   SubCutaneous three times a day before meals  labetalol 200 milliGRAM(s) Oral three times a day  multivitamin 1 Tablet(s) Oral daily  NIFEdipine XL 90 milliGRAM(s) Oral daily  silver sulfADIAZINE 1% Cream 1 Application(s) Topical daily  sodium bicarbonate 650 milliGRAM(s) Oral every 12 hours    MEDICATIONS  (PRN):  acetaminophen   Tablet. 650 milliGRAM(s) Oral every 6 hours PRN Moderate Pain (4 - 6)  dextrose Gel 1 Dose(s) Oral once PRN Blood Glucose LESS THAN 70 milliGRAM(s)/deciliter  glucagon  Injectable 1 milliGRAM(s) IntraMuscular once PRN Glucose LESS THAN 70 milligrams/deciliter

## 2018-03-19 NOTE — PROGRESS NOTE ADULT - SUBJECTIVE AND OBJECTIVE BOX
pt seen at bedside in NAD. dressing to left foot c/d/i  left plantar foot ulceration noted slowly healing small area plantar medial to arch noted granular and superficial  no pus no signs of acute infection   cleansed with NS applied SSD with DSD  await bypass   will follow 1-2x a week if foot worsens please call [phone] ASAP

## 2018-03-20 LAB
BUN SERPL-MCNC: 56 MG/DL — HIGH (ref 7–23)
BUN SERPL-MCNC: 60 MG/DL — HIGH (ref 7–23)
CALCIUM SERPL-MCNC: 8.9 MG/DL — SIGNIFICANT CHANGE UP (ref 8.4–10.5)
CALCIUM SERPL-MCNC: 9.5 MG/DL — SIGNIFICANT CHANGE UP (ref 8.4–10.5)
CHLORIDE SERPL-SCNC: 97 MMOL/L — LOW (ref 98–107)
CHLORIDE SERPL-SCNC: 97 MMOL/L — LOW (ref 98–107)
CK SERPL-CCNC: 99 U/L — SIGNIFICANT CHANGE UP (ref 30–200)
CO2 SERPL-SCNC: 28 MMOL/L — SIGNIFICANT CHANGE UP (ref 22–31)
CO2 SERPL-SCNC: 29 MMOL/L — SIGNIFICANT CHANGE UP (ref 22–31)
CREAT SERPL-MCNC: 4.29 MG/DL — HIGH (ref 0.5–1.3)
CREAT SERPL-MCNC: 4.34 MG/DL — HIGH (ref 0.5–1.3)
GLUCOSE BLDC GLUCOMTR-MCNC: 110 MG/DL — HIGH (ref 70–99)
GLUCOSE BLDC GLUCOMTR-MCNC: 135 MG/DL — HIGH (ref 70–99)
GLUCOSE BLDC GLUCOMTR-MCNC: 161 MG/DL — HIGH (ref 70–99)
GLUCOSE BLDC GLUCOMTR-MCNC: 90 MG/DL — SIGNIFICANT CHANGE UP (ref 70–99)
GLUCOSE SERPL-MCNC: 109 MG/DL — HIGH (ref 70–99)
GLUCOSE SERPL-MCNC: 67 MG/DL — LOW (ref 70–99)
HCT VFR BLD CALC: 38.3 % — LOW (ref 39–50)
HGB BLD-MCNC: 12.1 G/DL — LOW (ref 13–17)
MAGNESIUM SERPL-MCNC: 1.8 MG/DL — SIGNIFICANT CHANGE UP (ref 1.6–2.6)
MAGNESIUM SERPL-MCNC: 1.9 MG/DL — SIGNIFICANT CHANGE UP (ref 1.6–2.6)
MCHC RBC-ENTMCNC: 26.4 PG — LOW (ref 27–34)
MCHC RBC-ENTMCNC: 31.6 % — LOW (ref 32–36)
MCV RBC AUTO: 83.6 FL — SIGNIFICANT CHANGE UP (ref 80–100)
NRBC # FLD: 0 — SIGNIFICANT CHANGE UP
PHOSPHATE SERPL-MCNC: 4.4 MG/DL — SIGNIFICANT CHANGE UP (ref 2.5–4.5)
PHOSPHATE SERPL-MCNC: 4.9 MG/DL — HIGH (ref 2.5–4.5)
PLATELET # BLD AUTO: 317 K/UL — SIGNIFICANT CHANGE UP (ref 150–400)
PMV BLD: 11.1 FL — SIGNIFICANT CHANGE UP (ref 7–13)
POTASSIUM SERPL-MCNC: 4.4 MMOL/L — SIGNIFICANT CHANGE UP (ref 3.5–5.3)
POTASSIUM SERPL-MCNC: 4.5 MMOL/L — SIGNIFICANT CHANGE UP (ref 3.5–5.3)
POTASSIUM SERPL-SCNC: 4.4 MMOL/L — SIGNIFICANT CHANGE UP (ref 3.5–5.3)
POTASSIUM SERPL-SCNC: 4.5 MMOL/L — SIGNIFICANT CHANGE UP (ref 3.5–5.3)
RBC # BLD: 4.58 M/UL — SIGNIFICANT CHANGE UP (ref 4.2–5.8)
RBC # FLD: 14.5 % — SIGNIFICANT CHANGE UP (ref 10.3–14.5)
SODIUM SERPL-SCNC: 140 MMOL/L — SIGNIFICANT CHANGE UP (ref 135–145)
SODIUM SERPL-SCNC: 143 MMOL/L — SIGNIFICANT CHANGE UP (ref 135–145)
WBC # BLD: 5.01 K/UL — SIGNIFICANT CHANGE UP (ref 3.8–10.5)
WBC # FLD AUTO: 5.01 K/UL — SIGNIFICANT CHANGE UP (ref 3.8–10.5)

## 2018-03-20 PROCEDURE — 99233 SBSQ HOSP IP/OBS HIGH 50: CPT | Mod: GC

## 2018-03-20 PROCEDURE — 99232 SBSQ HOSP IP/OBS MODERATE 35: CPT

## 2018-03-20 RX ORDER — BUMETANIDE 0.25 MG/ML
1.5 INJECTION INTRAMUSCULAR; INTRAVENOUS
Qty: 10 | Refills: 0 | Status: DISCONTINUED | OUTPATIENT
Start: 2018-03-20 | End: 2018-03-21

## 2018-03-20 RX ADMIN — Medication 200 MILLIGRAM(S): at 06:14

## 2018-03-20 RX ADMIN — Medication 3 MILLILITER(S): at 03:56

## 2018-03-20 RX ADMIN — Medication 90 MILLIGRAM(S): at 06:14

## 2018-03-20 RX ADMIN — HEPARIN SODIUM 5000 UNIT(S): 5000 INJECTION INTRAVENOUS; SUBCUTANEOUS at 06:14

## 2018-03-20 RX ADMIN — Medication 3 MILLILITER(S): at 10:04

## 2018-03-20 RX ADMIN — Medication 1: at 12:59

## 2018-03-20 RX ADMIN — PREGABALIN 1000 MICROGRAM(S): 225 CAPSULE ORAL at 12:58

## 2018-03-20 RX ADMIN — Medication 200 MILLIGRAM(S): at 21:14

## 2018-03-20 RX ADMIN — ATORVASTATIN CALCIUM 80 MILLIGRAM(S): 80 TABLET, FILM COATED ORAL at 21:14

## 2018-03-20 RX ADMIN — BUMETANIDE 25 MG/HR: 0.25 INJECTION INTRAMUSCULAR; INTRAVENOUS at 07:54

## 2018-03-20 RX ADMIN — Medication 650 MILLIGRAM(S): at 17:25

## 2018-03-20 RX ADMIN — Medication 1 APPLICATION(S): at 12:55

## 2018-03-20 RX ADMIN — BUMETANIDE 15 MG/HR: 0.25 INJECTION INTRAMUSCULAR; INTRAVENOUS at 13:01

## 2018-03-20 RX ADMIN — Medication 200 MILLIGRAM(S): at 17:29

## 2018-03-20 RX ADMIN — Medication 650 MILLIGRAM(S): at 06:14

## 2018-03-20 RX ADMIN — Medication 1 TABLET(S): at 12:55

## 2018-03-20 RX ADMIN — Medication 81 MILLIGRAM(S): at 12:55

## 2018-03-20 RX ADMIN — BUMETANIDE 15 MG/HR: 0.25 INJECTION INTRAMUSCULAR; INTRAVENOUS at 19:43

## 2018-03-20 RX ADMIN — HEPARIN SODIUM 5000 UNIT(S): 5000 INJECTION INTRAVENOUS; SUBCUTANEOUS at 21:14

## 2018-03-20 RX ADMIN — HEPARIN SODIUM 5000 UNIT(S): 5000 INJECTION INTRAVENOUS; SUBCUTANEOUS at 17:24

## 2018-03-20 NOTE — PROGRESS NOTE ADULT - PROBLEM SELECTOR PLAN 8
-DVT ppx: HSQ  -Diabetic Diet    Disposition: possible ECTOR to follow for outpatient procedure pending surgeon accepting patient's insurance as outpatient once medically optimized    Dalton Nunez D.O.  PGY-1 EM/IM  Pager #[phone] -DVT ppx: HSQ  -Diabetic Diet    Disposition: continue medical optimization/attainment of euvolemia & will follow up vascular Sx for possible inpatient rescheduling for bypass.     Dalton Nunez D.O.  PGY-1 EM/IM  Pager #[phone]

## 2018-03-20 NOTE — PROGRESS NOTE ADULT - SUBJECTIVE AND OBJECTIVE BOX
[facility] DIVISION OF KIDNEY DISEASES AND HYPERTENSION -- FOLLOW UP NOTE  --------------------------------------------------------------------------------    HPI: 63-year-old male with PMH of HTN, DM, right BKA, and CKD admitted for left foot infection. As per review of labs on [facility]/[employer], Scr was 1.51 in 3/2017. As per PMD's office, Scr checked in 7/2017 was 2.30. Labs on admission (2/12) showed elevated Scr of 3.8 which peaked to 6.3 on 2/23 and is stable at 4.44 today. Pt. underwent vascular study/left leg angiogram on 3/8. Patient seen and examined today. Pt experienced SOB which has now resolved with initiation of  IV diuretic therapy. Comfortable off nasal cannula at bedside. Pt. denies SOB, CP or N/V.     PAST HISTORY  --------------------------------------------------------------------------------  No significant changes to PMH, PSH, FHx, SHx, unless otherwise noted    ALLERGIES & MEDICATIONS  --------------------------------------------------------------------------------  Allergies    No Known Allergies    Intolerances      Standing Inpatient Medications  aspirin enteric coated 81 milliGRAM(s) Oral daily  atorvastatin 80 milliGRAM(s) Oral at bedtime  buMETAnide Infusion 1.5 mG/Hr IV Continuous <Continuous>  cyanocobalamin 1000 MICROGram(s) Oral daily  dextrose 5%. 1000 milliLiter(s) IV Continuous <Continuous>  dextrose 50% Injectable 25 Gram(s) IV Push once  dextrose 50% Injectable 25 Gram(s) IV Push once  heparin  Injectable 5000 Unit(s) SubCutaneous every 8 hours  influenza   Vaccine 0.5 milliLiter(s) IntraMuscular once  insulin lispro (HumaLOG) corrective regimen sliding scale   SubCutaneous three times a day before meals  labetalol 200 milliGRAM(s) Oral three times a day  multivitamin 1 Tablet(s) Oral daily  NIFEdipine XL 90 milliGRAM(s) Oral daily  silver sulfADIAZINE 1% Cream 1 Application(s) Topical daily  sodium bicarbonate 650 milliGRAM(s) Oral every 12 hours    PRN Inpatient Medications  acetaminophen   Tablet. 650 milliGRAM(s) Oral every 6 hours PRN  benzocaine 15 mG/menthol 3.6 mG Lozenge 1 Lozenge Oral every 2 hours PRN  dextrose Gel 1 Dose(s) Oral once PRN  glucagon  Injectable 1 milliGRAM(s) IntraMuscular once PRN      REVIEW OF SYSTEMS  --------------------------------------------------------------------------------  Gen: +weakness  Skin: No rashes  Head/Eyes/Ears/Mouth: No headache  Respiratory: No dyspnea  CV: No chest pain, PND, orthopnea  GI: No abdominal pain, diarrhea  : No increased frequency  MSK: No edema  Neuro: No dizziness/lightheadedness  Heme: No bleeding    All other systems were reviewed and are negative, except as noted.    VITALS/PHYSICAL EXAM  --------------------------------------------------------------------------------  T(C): 37.2 (03-20-18 @ 06:15), Max: 37.2 (03-20-18 @ 06:15)  HR: 68 (03-20-18 @ 10:04) (67 - 73)  BP: 158/88 (03-20-18 @ 06:15) (155/66 - 170/60)  RR: 18 (03-20-18 @ 06:15) (17 - 18)  SpO2: 96% (03-20-18 @ 10:04) (94% - 97%)  Wt(kg): --    03-19-18 @ 07:01  -  03-20-18 @ 07:00  --------------------------------------------------------  IN: 0 mL / OUT: 3325 mL / NET: -3325 mL    03-20-18 @ 07:01  -  03-20-18 @ 11:03  --------------------------------------------------------  IN: 0 mL / OUT: 250 mL / NET: -250 mL    Physical Exam:  	Gen: NAD, well-appearing  	Pulm: +mild rales B/L  	CV: normal S1S2; no rub  	Abd: soft  	: No billy  	LE: no edema  	Skin: Warm, without rashes    LABS/STUDIES  --------------------------------------------------------------------------------              12.1   5.01  >-----------<  317      [03-20-18 @ 06:25]              38.3     143  |  97  |  56  ----------------------------<  67      [03-20-18 @ 06:25]  4.4   |  29  |  4.34        Ca     9.5     [03-20-18 @ 06:25]      Mg     1.9     [03-20-18 @ 06:25]      Phos  4.9     [03-20-18 @ 06:25    CK 99      [03-20-18 @ 06:25]    Creatinine Trend:  SCr 4.34 [03-20 @ 06:25]  SCr 4.22 [03-19 @ 17:35]  SCr 4.17 [03-19 @ 10:00]  SCr 4.17 [03-19 @ 06:21]  SCr 4.21 [03-18 @ 05:49]    Urinalysis - [02-25-18 @ 04:50]      Color PLYEL / Appearance CLEAR / SG 1.015 / pH 5.5      Gluc NEGATIVE / Ketone NEGATIVE  / Bili NEGATIVE / Urobili NORMAL       Blood TRACE / Protein 100 / Leuk Est TRACE / Nitrite NEGATIVE      RBC 0-2 / WBC 2-5 / Hyaline  / Gran  / Sq Epi OCC / Non Sq Epi  / Bacteria       Iron 21, TIBC 163, %sat --      [02-20-18 @ 05:00]  Ferritin 540.7      [02-20-18 @ 05:00]  HbA1c 5.9      [02-13-18 @ 07:11]  TSH 3.22      [03-08-18 @ 06:00]      Free Light Chains: kappa 20.50, lambda 11.20, ratio = 1.83 H      [02-25 @ 07:43]

## 2018-03-20 NOTE — PROGRESS NOTE ADULT - SUBJECTIVE AND OBJECTIVE BOX
Patient is a 63y old  Male who presents with a chief complaint of 63M PMH DM, HTN, CKD p/w L foot pain x 3 days. (19 Feb 2018 10:39)      SUBJECTIVE / OVERNIGHT EVENTS:    MEDICATIONS  (STANDING):  ALBUTerol/ipratropium for Nebulization 3 milliLiter(s) Nebulizer every 6 hours  aspirin enteric coated 81 milliGRAM(s) Oral daily  atorvastatin 80 milliGRAM(s) Oral at bedtime  buMETAnide Infusion 2.5 mG/Hr (25 mL/Hr) IV Continuous <Continuous>  cyanocobalamin 1000 MICROGram(s) Oral daily  dextrose 5%. 1000 milliLiter(s) (50 mL/Hr) IV Continuous <Continuous>  dextrose 50% Injectable 25 Gram(s) IV Push once  dextrose 50% Injectable 25 Gram(s) IV Push once  heparin  Injectable 5000 Unit(s) SubCutaneous every 8 hours  influenza   Vaccine 0.5 milliLiter(s) IntraMuscular once  insulin lispro (HumaLOG) corrective regimen sliding scale   SubCutaneous three times a day before meals  labetalol 200 milliGRAM(s) Oral three times a day  multivitamin 1 Tablet(s) Oral daily  NIFEdipine XL 90 milliGRAM(s) Oral daily  silver sulfADIAZINE 1% Cream 1 Application(s) Topical daily  sodium bicarbonate 650 milliGRAM(s) Oral every 12 hours    MEDICATIONS  (PRN):  acetaminophen   Tablet. 650 milliGRAM(s) Oral every 6 hours PRN Moderate Pain (4 - 6)  benzocaine 15 mG/menthol 3.6 mG Lozenge 1 Lozenge Oral every 2 hours PRN Sore Throat  dextrose Gel 1 Dose(s) Oral once PRN Blood Glucose LESS THAN 70 milliGRAM(s)/deciliter  glucagon  Injectable 1 milliGRAM(s) IntraMuscular once PRN Glucose LESS THAN 70 milligrams/deciliter        CAPILLARY BLOOD GLUCOSE      POCT Blood Glucose.: 175 mg/dL (19 Mar 2018 22:49)  POCT Blood Glucose.: 141 mg/dL (19 Mar 2018 17:04)  POCT Blood Glucose.: 97 mg/dL (19 Mar 2018 12:21)  POCT Blood Glucose.: 89 mg/dL (19 Mar 2018 08:15)    I&O's Summary    19 Mar 2018 07:01  -  20 Mar 2018 07:00  --------------------------------------------------------  IN: 0 mL / OUT: 3325 mL / NET: -3325 mL        PHYSICAL EXAM:  GENERAL: NAD, well-developed  HEAD:  Atraumatic, Normocephalic  EYES: EOMI, PERRLA, conjunctiva and sclera clear  NECK: Supple, No JVD  CHEST/LUNG: Clear to auscultation bilaterally; No wheeze  HEART: Regular rate and rhythm; No murmurs, rubs, or gallops  ABDOMEN: Soft, Nontender, Nondistended; Bowel sounds present  EXTREMITIES:  2+ Peripheral Pulses, No clubbing, cyanosis, or edema  PSYCH: AAOx3  NEUROLOGY: non-focal  SKIN: No rashes or lesions    LABS:    WBC Trend: 4.66<--, 4.74<--, 4.13<--  Hb Trend: 10.9<--, 9.7<--, 9.6<--, 9.8<--, 10.2<--  Plt Trend: 231<--, 263<--, 238<--, 235<--, 241<--  03-19    141  |  98  |  54<H>  ----------------------------<  129<H>  5.0   |  26  |  4.22<H>    Ca    9.6      19 Mar 2018 17:35  Phos  4.5     03-19  Mg     2.0     03-19      Creatinine Trend: 4.22<--, 4.17<--, 4.17<--, 4.21<--, 4.09<--, 4.17<--            Microbiology:  Urine Cx:  Blood Cx:    RADIOLOGY & ADDITIONAL TESTS:  X- Ray:  CT:  Ultrasound:  [ ] imaging personally reviewed and interpreted by me    Consultant(s) Notes Reviewed:      Care Discussed with Consultants/Other Providers: Patient is a 63y old  Male who presents with a chief complaint of 63M PMH DM, HTN, CKD p/w L foot pain x 3 days. (19 Feb 2018 10:39)      SUBJECTIVE / OVERNIGHT EVENTS: No acute overnight events. Patient diuresing well output -3.3 L; saturating appropriately on RA. Notes improvement in muscle aches & pain.     MEDICATIONS  (STANDING):  ALBUTerol/ipratropium for Nebulization 3 milliLiter(s) Nebulizer every 6 hours  aspirin enteric coated 81 milliGRAM(s) Oral daily  atorvastatin 80 milliGRAM(s) Oral at bedtime  buMETAnide Infusion 2.5 mG/Hr (25 mL/Hr) IV Continuous <Continuous>  cyanocobalamin 1000 MICROGram(s) Oral daily  dextrose 5%. 1000 milliLiter(s) (50 mL/Hr) IV Continuous <Continuous>  dextrose 50% Injectable 25 Gram(s) IV Push once  dextrose 50% Injectable 25 Gram(s) IV Push once  heparin  Injectable 5000 Unit(s) SubCutaneous every 8 hours  influenza   Vaccine 0.5 milliLiter(s) IntraMuscular once  insulin lispro (HumaLOG) corrective regimen sliding scale   SubCutaneous three times a day before meals  labetalol 200 milliGRAM(s) Oral three times a day  multivitamin 1 Tablet(s) Oral daily  NIFEdipine XL 90 milliGRAM(s) Oral daily  silver sulfADIAZINE 1% Cream 1 Application(s) Topical daily  sodium bicarbonate 650 milliGRAM(s) Oral every 12 hours    MEDICATIONS  (PRN):  acetaminophen   Tablet. 650 milliGRAM(s) Oral every 6 hours PRN Moderate Pain (4 - 6)  benzocaine 15 mG/menthol 3.6 mG Lozenge 1 Lozenge Oral every 2 hours PRN Sore Throat  dextrose Gel 1 Dose(s) Oral once PRN Blood Glucose LESS THAN 70 milliGRAM(s)/deciliter  glucagon  Injectable 1 milliGRAM(s) IntraMuscular once PRN Glucose LESS THAN 70 milligrams/deciliter        CAPILLARY BLOOD GLUCOSE      POCT Blood Glucose.: 175 mg/dL (19 Mar 2018 22:49)  POCT Blood Glucose.: 141 mg/dL (19 Mar 2018 17:04)  POCT Blood Glucose.: 97 mg/dL (19 Mar 2018 12:21)  POCT Blood Glucose.: 89 mg/dL (19 Mar 2018 08:15)    I&O's Summary    19 Mar 2018 07:01  -  20 Mar 2018 07:00  --------------------------------------------------------  IN: 0 mL / OUT: 3325 mL / NET: -3325 mL        PHYSICAL EXAM:  GENERAL: no acute distress  HEAD:  Atraumatic, Normocephalic  EYES: EOMI, PERRLA, conjunctiva and sclera clear  NECK: Supple, No JVD  CHEST/LUNG: diminished breath sounds bases otherwise cta b/l; No wheeze  HEART: Regular rate and rhythm; No murmurs, rubs, or gallops  ABDOMEN: Soft, Nontender, Nondistended; Bowel sounds present  EXTREMITIES:  2+ Peripheral Pulses, s/p R BKA. LLE non-edematous   PSYCH: AAOx3  NEUROLOGY: non-focal  SKIN: No rashes or lesions    LABS:    WBC Trend: 4.66<--, 4.74<--, 4.13<--  Hb Trend: 10.9<--, 9.7<--, 9.6<--, 9.8<--, 10.2<--  Plt Trend: 231<--, 263<--, 238<--, 235<--, 241<--  03-19    141  |  98  |  54<H>  ----------------------------<  129<H>  5.0   |  26  |  4.22<H>    Ca    9.6      19 Mar 2018 17:35  Phos  4.5     03-19  Mg     2.0     03-19      Creatinine Trend: 4.22<--, 4.17<--, 4.17<--, 4.21<--, 4.09<--, 4.17<--      Consultant(s) Notes Reviewed:  Nephrology, Vascular Patient is a 63y old  Male who presents with a chief complaint of 63M PMH DM, HTN, CKD p/w L foot pain x 3 days. (19 Feb 2018 10:39)      SUBJECTIVE / OVERNIGHT EVENTS: No acute overnight events. Patient diuresing well output -3.3 L; saturating appropriately on RA. Notes improvement in muscle aches & pain.     MEDICATIONS  (STANDING):  ALBUTerol/ipratropium for Nebulization 3 milliLiter(s) Nebulizer every 6 hours  aspirin enteric coated 81 milliGRAM(s) Oral daily  atorvastatin 80 milliGRAM(s) Oral at bedtime  buMETAnide Infusion 2.5 mG/Hr (25 mL/Hr) IV Continuous <Continuous>  cyanocobalamin 1000 MICROGram(s) Oral daily  dextrose 5%. 1000 milliLiter(s) (50 mL/Hr) IV Continuous <Continuous>  dextrose 50% Injectable 25 Gram(s) IV Push once  dextrose 50% Injectable 25 Gram(s) IV Push once  heparin  Injectable 5000 Unit(s) SubCutaneous every 8 hours  influenza   Vaccine 0.5 milliLiter(s) IntraMuscular once  insulin lispro (HumaLOG) corrective regimen sliding scale   SubCutaneous three times a day before meals  labetalol 200 milliGRAM(s) Oral three times a day  multivitamin 1 Tablet(s) Oral daily  NIFEdipine XL 90 milliGRAM(s) Oral daily  silver sulfADIAZINE 1% Cream 1 Application(s) Topical daily  sodium bicarbonate 650 milliGRAM(s) Oral every 12 hours    MEDICATIONS  (PRN):  acetaminophen   Tablet. 650 milliGRAM(s) Oral every 6 hours PRN Moderate Pain (4 - 6)  benzocaine 15 mG/menthol 3.6 mG Lozenge 1 Lozenge Oral every 2 hours PRN Sore Throat  dextrose Gel 1 Dose(s) Oral once PRN Blood Glucose LESS THAN 70 milliGRAM(s)/deciliter  glucagon  Injectable 1 milliGRAM(s) IntraMuscular once PRN Glucose LESS THAN 70 milligrams/deciliter        CAPILLARY BLOOD GLUCOSE      POCT Blood Glucose.: 175 mg/dL (19 Mar 2018 22:49)  POCT Blood Glucose.: 141 mg/dL (19 Mar 2018 17:04)  POCT Blood Glucose.: 97 mg/dL (19 Mar 2018 12:21)  POCT Blood Glucose.: 89 mg/dL (19 Mar 2018 08:15)    I&O's Summary    19 Mar 2018 07:01  -  20 Mar 2018 07:00  --------------------------------------------------------  IN: 0 mL / OUT: 3325 mL / NET: -3325 mL        PHYSICAL EXAM:  GENERAL: no acute distress  HEAD:  Atraumatic, Normocephalic  EYES: EOMI, PERRLA, conjunctiva and sclera clear  NECK: Supple, No JVD  CHEST/LUNG: diminished breath sounds bases otherwise cta b/l; No wheeze  HEART: Regular rate and rhythm; No murmurs, rubs, or gallops  ABDOMEN: Soft, Nontender, Nondistended; Bowel sounds present  EXTREMITIES:  2+ Peripheral Pulses, s/p R BKA. LLE non-edematous   PSYCH: AAOx3  NEUROLOGY: non-focal  SKIN: No rashes or lesions    LABS:    WBC Trend: 4.66<--, 4.74<--, 4.13<--  Hb Trend: 10.9<--, 9.7<--, 9.6<--, 9.8<--, 10.2<--  Plt Trend: 231<--, 263<--, 238<--, 235<--, 241<--  03-19    141  |  98  |  54<H>  ----------------------------<  129<H>  5.0   |  26  |  4.22<H>    Ca    9.6      19 Mar 2018 17:35  Phos  4.5     03-19  Mg     2.0     03-19    POCUS Lungs: A-line predominant bilaterally; few scattered B-lines. Trace PLEF on right; small PLEF on left.     Creatinine Trend: 4.22<--, 4.17<--, 4.17<--, 4.21<--, 4.09<--, 4.17<--      Consultant(s) Notes Reviewed:  Nephrology, Vascular

## 2018-03-20 NOTE — PROGRESS NOTE ADULT - PROBLEM SELECTOR PLAN 2
Pt with hypervolemia in the setting of CKD. Pt. responsive to IV diuretic therapy. Recommend to continue at a lower rate or begin IVP BID. Pt clinically stable today. Labs reviewed. No acute indication for renal replacement therapy at this time. Monitor weight daily. Pt with hypervolemia in the setting of CKD. Pt. responsive to IV diuretic therapy. Recommend to decrease dose of diuretics. Pt clinically stable. Labs reviewed. No acute indication for renal replacement therapy at this time. Monitor weight daily.

## 2018-03-20 NOTE — PROGRESS NOTE ADULT - ATTENDING COMMENTS
Pt was seen and examined by me.  Case d/w Dr. Nunez and I agree with findings and plan as detailed per above.    No overnight events.  Pt appears more comfortable - no generalized pain.  No chest pain, SOB, or palpitations.  No fevers, chills, N/V/C/D.  LLE edema resolved now, breath sounds increased now at bases; no audible crackles/rales on auscultation.    This is a 63 M PMH of DM, HTN, CKD, Rt BKA in 2009 who p/w sepsis 2/2 cellulitis and LLE SFA disease, c/b ATN, planned for Lt fem-pop bypass but canceled due to acute hypoxic respiratory failure 2/2 fluid overload, likely 2/2 acute diastolic CHF.    # Hypoxic respiratory failure/Acute Diastolic HF  likely secondary to volume overload  -improving  -bumex gtt decreased  -strict I/Os  -cards team f/u appreciated  -monitoring electrolytes    # JANY likely ATN on underlying CKD  -monitoring creat - stable at present; no indication for HD  -Avoid nephrotoxins. Monitor I& O, monitor BMP  -renal f/u appreciated    # PVD  -will d/w vasc sx regarding timing of Fem-pop bypass

## 2018-03-20 NOTE — PROGRESS NOTE ADULT - SUBJECTIVE AND OBJECTIVE BOX
Overnight Events:   Patient reports breathing is better. Patient denies CP.      acetaminophen   Tablet. 650 milliGRAM(s) Oral every 6 hours PRN  aspirin enteric coated 81 milliGRAM(s) Oral daily  atorvastatin 80 milliGRAM(s) Oral at bedtime  benzocaine 15 mG/menthol 3.6 mG Lozenge 1 Lozenge Oral every 2 hours PRN  buMETAnide Infusion 1.5 mG/Hr IV Continuous <Continuous>  cyanocobalamin 1000 MICROGram(s) Oral daily  dextrose 5%. 1000 milliLiter(s) IV Continuous <Continuous>  dextrose 50% Injectable 25 Gram(s) IV Push once  dextrose 50% Injectable 25 Gram(s) IV Push once  dextrose Gel 1 Dose(s) Oral once PRN  glucagon  Injectable 1 milliGRAM(s) IntraMuscular once PRN  heparin  Injectable 5000 Unit(s) SubCutaneous every 8 hours  influenza   Vaccine 0.5 milliLiter(s) IntraMuscular once  insulin lispro (HumaLOG) corrective regimen sliding scale   SubCutaneous three times a day before meals  labetalol 200 milliGRAM(s) Oral three times a day  multivitamin 1 Tablet(s) Oral daily  NIFEdipine XL 90 milliGRAM(s) Oral daily  silver sulfADIAZINE 1% Cream 1 Application(s) Topical daily  sodium bicarbonate 650 milliGRAM(s) Oral every 12 hours      PAST MEDICAL & SURGICAL HISTORY:  Kidney disease  HTN (hypertension)  DM (diabetes mellitus)  S/P BKA (below knee amputation) unilateral, right      Vitals:  T(F): 97.4 (03-20), Max: 98.9 (03-20)  HR: 66 (03-20) (66 - 73)  BP: 143/72 (03-20) (143/72 - 170/60)  RR: 18 (03-20)  SpO2: 94% (03-20)  I&O's Summary    19 Mar 2018 07:01  -  20 Mar 2018 07:00  --------------------------------------------------------  IN: 0 mL / OUT: 3325 mL / NET: -3325 mL    20 Mar 2018 07:01  -  20 Mar 2018 13:21  --------------------------------------------------------  IN: 0 mL / OUT: 250 mL / NET: -250 mL        Physical Exam:  Appearance: No acute distress  Eyes: PERRL  HENT: Normal oral muscosa  Cardiovascular: RRR, S1, S2  Respiratory: Decreased BS at bases bilaterally  Gastrointestinal: soft, non-tender  Musculoskeletal: No LE edema  Neurologic: Non-focal  Psychiatry: mood & affect appropriate                            12.1   5.01  )-----------( 317      ( 20 Mar 2018 06:25 )             38.3     03-20    143  |  97<L>  |  56<H>  ----------------------------<  67<L>  4.4   |  29  |  4.34<H>    Ca    9.5      20 Mar 2018 06:25  Phos  4.9     03-20  Mg     1.9     03-20        CARDIAC MARKERS ( 20 Mar 2018 06:25 )  x     / x     / 99 u/L / x     / x          Serum Pro-Brain Natriuretic Peptide: 8301 pg/mL (03-17 @ 06:45)        TTE:  CONCLUSIONS:  1. Mitral annular calcification, otherwise normal mitral  valve. Mild mitral regurgitation.  2. Aortic valve leaflet morphology not well visualized.  Peak transaortic valve gradient equals 19 mm Hg, mean  transaortic valve gradient equals 11 mm Hg, consistent with  probable mild aortic stenosis. Minimal aortic  regurgitation.  3. Normal left ventricular internal dimensions and wall  thicknesses.  4. Endocardium not well visualized; grossly normal left  ventricular systolic function.  5. The right ventricle is not well visualized; grossly  normal right ventricular systolic function.  6. Estimated right ventricular systolic pressure equals 57  mm Hg, assuming right atrial pressure equals 10 mm Hg,  consistent with moderate pulmonary hypertension.  7. Bilateral pleural effusions.

## 2018-03-20 NOTE — PROGRESS NOTE ADULT - PROBLEM SELECTOR PLAN 1
Pulmonary edema + significant pleural effusions bilaterally despite being on IV lasix   Continue Bumex ggt; concern for hypokalemia given muscle aches; BMP q 12 while on Bumex  Monitor I's and O's & daily weight Pulmonary edema + significant pleural effusions bilaterally despite being on IV lasix   C/w Bumex drip; lower infusion rate today as SCr beginning to trend up & patient diuresing well  Monitor I's and O's & daily weight -Pulmonary edema + significant pleural effusions bilaterally despite being on IV lasix   -C/w Bumex drip; lower infusion rate today as SCr beginning to trend up & patient diuresing well  -CK & electrolytes wnl  -Monitor I's and O's & daily weight -Pulmonary edema + significant pleural effusions bilaterally despite being on IV lasix   -C/w Bumex drip; lower infusion rate today as SCr beginning to trend up & patient diuresing well with decreasing PLEFs on POCUS  -CK & electrolytes wnl  -Monitor I's and O's & daily weight

## 2018-03-20 NOTE — PROGRESS NOTE ADULT - ASSESSMENT
63M PMH DM on januvia, FS 80s at home, HTN, CKD, R BKA in 2009 presented with cellulitis and sepsis c/b ATN which improvedt found to have severe LLE SFA disease, now pending LLE fem-pop bypass. 63M PMH DM on januvia, HTN, CKD, R BKA in 2009 presented with cellulitis and sepsis c/b ATN which improvedt found to have severe LLE SFA disease, now pending LLE fem-pop bypass.

## 2018-03-20 NOTE — PROGRESS NOTE ADULT - PROBLEM SELECTOR PLAN 1
Pt. with JANY on CKD in the setting of infection and diarrhea. CKD in the setting of long-standing DM. Scr was 1.5 in 3/2017, increased to 2.30 in 7/2017. Scr on admission (2/12) was elevated at 3.81, increased to 6.3 on 2/23 and has now improved. Pt. also underwent vascular study/left leg angiogram on 3/8. Scr stable at 4.3 today. Pt. at increased risk for radiocontrast nephropathy. Monitor BMP and urine output. Avoid any potential nephrotoxins.

## 2018-03-20 NOTE — PROGRESS NOTE ADULT - PROBLEM SELECTOR PLAN 2
-Creatinine stable CKD IV in the setting of  ATN from sepsis & LANNY.   -continuing to monitor urine outputs, trend sCr daily

## 2018-03-20 NOTE — PROGRESS NOTE ADULT - ASSESSMENT
63yoM h/o DM, CVA in 1990s, HTN, CKD stage 3, R BKA in 2009 p/w 3 day h/o worsening left foot pain. s/p  Qctap-cyxm-mzrnvfypa angiography and Left leg angiography showing Ostial left anterior tibial:  100% stenosis. Proximal left anterior tibial: 100 % stenosis. Mid left anterior tibial: 100 % stenosis. Distal left anterior tibial: 100 % stenosis. 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 Plan for femoral popliteal bypass. Given 3 units blood transfusion now ADHF (likely diastolic) with bilateral large pleural effusions. Patient placed on bumex gtt. Repeat CXR yesterday with moderate bilateral effusions. Patient now on RA    -agree with decreasing dose of bumex gtt as patient seems close to euvolemia, diuretics per nephrology  -once patient euvolemic, off bumex gtt, would consider patient optimized for surgery from cardiac standpoint

## 2018-03-20 NOTE — PROGRESS NOTE ADULT - PROBLEM SELECTOR PLAN 6
-decrease labetalol dose due to mild bradycardia  -increase Nifedipine to 90 mg XL & monitor BP -decrease labetalol dose due to mild bradycardia  -c/w Nifedipine to 90 mg XL & monitor BP

## 2018-03-21 LAB
BUN SERPL-MCNC: 61 MG/DL — HIGH (ref 7–23)
CALCIUM SERPL-MCNC: 8.9 MG/DL — SIGNIFICANT CHANGE UP (ref 8.4–10.5)
CHLORIDE SERPL-SCNC: 98 MMOL/L — SIGNIFICANT CHANGE UP (ref 98–107)
CO2 SERPL-SCNC: 30 MMOL/L — SIGNIFICANT CHANGE UP (ref 22–31)
CREAT SERPL-MCNC: 4.45 MG/DL — HIGH (ref 0.5–1.3)
GLUCOSE BLDC GLUCOMTR-MCNC: 105 MG/DL — HIGH (ref 70–99)
GLUCOSE BLDC GLUCOMTR-MCNC: 145 MG/DL — HIGH (ref 70–99)
GLUCOSE BLDC GLUCOMTR-MCNC: 163 MG/DL — HIGH (ref 70–99)
GLUCOSE BLDC GLUCOMTR-MCNC: 90 MG/DL — SIGNIFICANT CHANGE UP (ref 70–99)
GLUCOSE SERPL-MCNC: 117 MG/DL — HIGH (ref 70–99)
HCT VFR BLD CALC: 38.4 % — LOW (ref 39–50)
HGB BLD-MCNC: 12.6 G/DL — LOW (ref 13–17)
MAGNESIUM SERPL-MCNC: 1.8 MG/DL — SIGNIFICANT CHANGE UP (ref 1.6–2.6)
MCHC RBC-ENTMCNC: 27.8 PG — SIGNIFICANT CHANGE UP (ref 27–34)
MCHC RBC-ENTMCNC: 32.8 % — SIGNIFICANT CHANGE UP (ref 32–36)
MCV RBC AUTO: 84.6 FL — SIGNIFICANT CHANGE UP (ref 80–100)
NRBC # FLD: 0 — SIGNIFICANT CHANGE UP
PHOSPHATE SERPL-MCNC: 5.5 MG/DL — HIGH (ref 2.5–4.5)
PLATELET # BLD AUTO: 291 K/UL — SIGNIFICANT CHANGE UP (ref 150–400)
PMV BLD: 10.8 FL — SIGNIFICANT CHANGE UP (ref 7–13)
POTASSIUM SERPL-MCNC: 4.2 MMOL/L — SIGNIFICANT CHANGE UP (ref 3.5–5.3)
POTASSIUM SERPL-SCNC: 4.2 MMOL/L — SIGNIFICANT CHANGE UP (ref 3.5–5.3)
RBC # BLD: 4.54 M/UL — SIGNIFICANT CHANGE UP (ref 4.2–5.8)
RBC # FLD: 14.3 % — SIGNIFICANT CHANGE UP (ref 10.3–14.5)
SODIUM SERPL-SCNC: 143 MMOL/L — SIGNIFICANT CHANGE UP (ref 135–145)
WBC # BLD: 5.2 K/UL — SIGNIFICANT CHANGE UP (ref 3.8–10.5)
WBC # FLD AUTO: 5.2 K/UL — SIGNIFICANT CHANGE UP (ref 3.8–10.5)

## 2018-03-21 PROCEDURE — 99232 SBSQ HOSP IP/OBS MODERATE 35: CPT

## 2018-03-21 PROCEDURE — 99233 SBSQ HOSP IP/OBS HIGH 50: CPT | Mod: GC

## 2018-03-21 PROCEDURE — 99231 SBSQ HOSP IP/OBS SF/LOW 25: CPT

## 2018-03-21 RX ORDER — FUROSEMIDE 40 MG
40 TABLET ORAL DAILY
Refills: 0 | Status: DISCONTINUED | OUTPATIENT
Start: 2018-03-21 | End: 2018-03-23

## 2018-03-21 RX ADMIN — Medication 90 MILLIGRAM(S): at 05:12

## 2018-03-21 RX ADMIN — BUMETANIDE 15 MG/HR: 0.25 INJECTION INTRAMUSCULAR; INTRAVENOUS at 01:50

## 2018-03-21 RX ADMIN — HEPARIN SODIUM 5000 UNIT(S): 5000 INJECTION INTRAVENOUS; SUBCUTANEOUS at 13:45

## 2018-03-21 RX ADMIN — Medication 1 APPLICATION(S): at 13:38

## 2018-03-21 RX ADMIN — Medication 200 MILLIGRAM(S): at 13:45

## 2018-03-21 RX ADMIN — PREGABALIN 1000 MICROGRAM(S): 225 CAPSULE ORAL at 13:45

## 2018-03-21 RX ADMIN — Medication 1 TABLET(S): at 13:45

## 2018-03-21 RX ADMIN — Medication 81 MILLIGRAM(S): at 13:45

## 2018-03-21 RX ADMIN — Medication 200 MILLIGRAM(S): at 21:12

## 2018-03-21 RX ADMIN — Medication 650 MILLIGRAM(S): at 05:12

## 2018-03-21 RX ADMIN — ATORVASTATIN CALCIUM 80 MILLIGRAM(S): 80 TABLET, FILM COATED ORAL at 21:12

## 2018-03-21 RX ADMIN — HEPARIN SODIUM 5000 UNIT(S): 5000 INJECTION INTRAVENOUS; SUBCUTANEOUS at 05:12

## 2018-03-21 RX ADMIN — Medication 200 MILLIGRAM(S): at 05:12

## 2018-03-21 RX ADMIN — Medication 40 MILLIGRAM(S): at 13:45

## 2018-03-21 RX ADMIN — Medication 650 MILLIGRAM(S): at 17:36

## 2018-03-21 RX ADMIN — HEPARIN SODIUM 5000 UNIT(S): 5000 INJECTION INTRAVENOUS; SUBCUTANEOUS at 21:12

## 2018-03-21 NOTE — PROGRESS NOTE ADULT - ATTENDING COMMENTS
Pt was seen and examined by me this morning.  Case d/w Dr. Nunez and I agree with findings and plan as detailed per above.    No overnight events.  Denies any chest pain, SOB, or palpitations.  O/E - no rales/crackles audible on auscultation, no LLE edema now.    This is a 63 M PMH of DM, HTN, CKD, Rt BKA in 2009 who p/w sepsis 2/2 cellulitis and LLE SFA disease, c/b ATN, planned for Lt fem-pop bypass but canceled due to acute hypoxic respiratory failure 2/2 fluid overload, likely 2/2 acute diastolic CHF.    # Hypoxic respiratory failure/Acute Diastolic HF  likely secondary to volume overload  -improving; now normoxic on room air  -d/c bumex gtt and start lasix po  -continue monitoring I/Os  -monitoring electrolytes    # JANY likely ATN on underlying CKD  -monitoring creat - stable at present; no indication for HD presently  -planned for Fem-pop bypass (possibly 3/26) - no indication for pre-op HD but may be needed afterwards.  -Monitor I& O, monitor BMP  -D/w renal

## 2018-03-21 NOTE — PROGRESS NOTE ADULT - ASSESSMENT
63M PMHx DM, s/p R BKA, now presenting with Diabetic foot soft tissue infection, w/ angiography showing arterial stenosis of LLE, plan for LLE bypass. Or cancelled due to pulm effusions. CT showing pulm effusions, pulm consulted, pt started on Bumex drip    - continue diet  - f/u CXR  - f/u renal, f/u pulm  - Bumex drip  - possible thoracentesis to drain pulmonary effusions if respiratory status worsens  - medically optimize  - SQH, ASA  - Aggressively record urine output  - Wound care as per podiatry 63M PMHx DM, s/p R BKA, now presenting with Diabetic foot soft tissue infection, w/ angiography showing arterial stenosis of LLE, plan for LLE bypass. Bypass cancelled (3/15) due to dyspnea while supine 2/2 pulmonary effusions. Pulmonary, Nephrology, Medicine, & Cardiology are consulted for optimization of patient. Undergoing aggressive diuresis & fluid management.     - awaiting optimization for OR by medicine teams  - document Medical, pulmonary, & Cardiac optimization for planned bypass procedure   - please document Medicine, Pulmonary & Cardiac risk stratification for planned bypass procedure  - Strict I&O    - Wound care as per podiatry      Following; please contact Vascular Surgery once patient is optimized for OR.      KAREN Reynolds MD (PGY2)    (Please page C team [phone] for questions/concerns.)

## 2018-03-21 NOTE — PROGRESS NOTE ADULT - PROBLEM SELECTOR PLAN 8
-DVT ppx: HSQ  -Diabetic Diet    Disposition: continue medical optimization/attainment of euvolemia & will follow up vascular Sx for possible inpatient rescheduling for bypass.     Dalton Nunez D.O.  PGY-1 EM/IM  Pager #[phone] -DVT ppx: HSQ  -Diabetic Diet    Disposition: continue medical optimization/attainment of euvolemia; tentative plan for surgery next week pending anesthesia clearance.     Dalton Nunez D.O.  PGY-1 EM/IM  Pager #[phone]

## 2018-03-21 NOTE — PROGRESS NOTE ADULT - PROBLEM SELECTOR PLAN 4
-S/p angiogram. fem-pop bypass postponed secondary to suboptimal respiratory status - hold off for now until cardiopulmonary optimization    -Consented via surrogate, Eres (patient's brother) & patient agreeable to surgery.   -Left foot with cellulitis with resulting sepsis which is now improved since admission  -S/p vancomycin and zosyn. Completed Unasyn on 2/22.  -Podiatry has evaluated patient and report good improvement. Follow up appreciated.

## 2018-03-21 NOTE — PROGRESS NOTE ADULT - SUBJECTIVE AND OBJECTIVE BOX
[facility] DIVISION OF KIDNEY DISEASES AND HYPERTENSION -- FOLLOW UP NOTE  --------------------------------------------------------------------------------    HPI: 63-year-old male with PMH of HTN, DM, right BKA, and CKD admitted for left foot infection. As per review of labs on [place]/[employer], Scr was 1.51 in 3/2017. As per PMD's office, Scr checked in 7/2017 was 2.30. Labs on admission (2/12) showed elevated Scr of 3.8 which peaked to 6.3 on 2/23 and is stable at 4.44 today. Pt. underwent vascular study/left leg angiogram on 3/8. Patient seen and examined today. Pt experienced SOB which has now resolved with initiation of  IV diuretic therapy. Comfortable off nasal cannula at bedside. Pt. denies SOB, CP or N/V.     PAST HISTORY  --------------------------------------------------------------------------------  No significant changes to PMH, PSH, FHx, SHx, unless otherwise noted    ALLERGIES & MEDICATIONS  --------------------------------------------------------------------------------  Allergies    No Known Allergies    Intolerances      Standing Inpatient Medications  aspirin enteric coated 81 milliGRAM(s) Oral daily  atorvastatin 80 milliGRAM(s) Oral at bedtime  cyanocobalamin 1000 MICROGram(s) Oral daily  dextrose 5%. 1000 milliLiter(s) IV Continuous <Continuous>  dextrose 50% Injectable 25 Gram(s) IV Push once  dextrose 50% Injectable 25 Gram(s) IV Push once  furosemide    Tablet 40 milliGRAM(s) Oral daily  heparin  Injectable 5000 Unit(s) SubCutaneous every 8 hours  influenza   Vaccine 0.5 milliLiter(s) IntraMuscular once  insulin lispro (HumaLOG) corrective regimen sliding scale   SubCutaneous three times a day before meals  labetalol 200 milliGRAM(s) Oral three times a day  multivitamin 1 Tablet(s) Oral daily  NIFEdipine XL 90 milliGRAM(s) Oral daily  silver sulfADIAZINE 1% Cream 1 Application(s) Topical daily  sodium bicarbonate 650 milliGRAM(s) Oral every 12 hours    PRN Inpatient Medications  acetaminophen   Tablet. 650 milliGRAM(s) Oral every 6 hours PRN  benzocaine 15 mG/menthol 3.6 mG Lozenge 1 Lozenge Oral every 2 hours PRN  dextrose Gel 1 Dose(s) Oral once PRN  glucagon  Injectable 1 milliGRAM(s) IntraMuscular once PRN      REVIEW OF SYSTEMS  --------------------------------------------------------------------------------  Gen: +weakness  Skin: No rashes  Head/Eyes/Ears/Mouth: No headache  Respiratory: No dyspnea  CV: No chest pain, PND, orthopnea  GI: No abdominal pain, diarrhea  : No increased frequency  MSK: No edema  Neuro: No dizziness/lightheadedness  Heme: No bleeding    All other systems were reviewed and are negative, except as noted.    VITALS/PHYSICAL EXAM  --------------------------------------------------------------------------------  T(C): 36.6 (03-21-18 @ 05:10), Max: 36.9 (03-20-18 @ 20:49)  HR: 68 (03-21-18 @ 05:10) (66 - 82)  BP: 157/74 (03-21-18 @ 05:10) (143/72 - 157/74)  RR: 18 (03-21-18 @ 05:10) (18 - 18)  SpO2: 96% (03-21-18 @ 05:10) (93% - 96%)  Wt(kg): --        03-20-18 @ 07:01  -  03-21-18 @ 07:00  --------------------------------------------------------  IN: 240 mL / OUT: 2075 mL / NET: -1835 mL    03-21-18 @ 07:01  -  03-21-18 @ 12:42  --------------------------------------------------------  IN: 0 mL / OUT: 410 mL / NET: -410 mL      Physical Exam:  	Gen: NAD, well-appearing  	HEENT: PERRL, supple neck, clear oropharynx  	Pulm: CTA B/L  	CV: RRR, S1S2; no rub  	Back: No spinal or CVA tenderness; no sacral edema  	Abd: +BS, soft, nontender/nondistended  	: No suprapubic tenderness  	UE: Warm, FROM, no clubbing, intact strength; no edema; no asterixis  	LE: Warm, FROM, no clubbing, intact strength; no edema  	Neuro: No focal deficits, intact gait  	Psych: Normal affect and mood  	Skin: Warm, without rashes  	Vascular access:    LABS/STUDIES  --------------------------------------------------------------------------------              12.6   5.20  >-----------<  291      [03-21-18 @ 06:45]              38.4     143  |  98  |  61  ----------------------------<  117      [03-21-18 @ 06:45]  4.2   |  30  |  4.45        Ca     8.9     [03-21-18 @ 06:45]      Mg     1.8     [03-21-18 @ 06:45]      Phos  5.5     [03-21-18 @ 06:45]    CK 99      [03-20-18 @ 06:25]    Creatinine Trend:  SCr 4.45 [03-21 @ 06:45]  SCr 4.29 [03-20 @ 18:40]  SCr 4.34 [03-20 @ 06:25]  SCr 4.22 [03-19 @ 17:35]  SCr 4.17 [03-19 @ 10:00]    Urinalysis - [02-25-18 @ 04:50]      Color PLYEL / Appearance CLEAR / SG 1.015 / pH 5.5      Gluc NEGATIVE / Ketone NEGATIVE  / Bili NEGATIVE / Urobili NORMAL       Blood TRACE / Protein 100 / Leuk Est TRACE / Nitrite NEGATIVE      RBC 0-2 / WBC 2-5 / Hyaline  / Gran  / Sq Epi OCC / Non Sq Epi  / Bacteria       Iron 21, TIBC 163, %sat --      [02-20-18 @ 05:00]  Ferritin 540.7      [02-20-18 @ 05:00]  HbA1c 5.9      [02-13-18 @ 07:11]  TSH 3.22      [03-08-18 @ 06:00]      Free Light Chains: kappa 20.50, lambda 11.20, ratio = 1.83 H      [02-25 @ 07:43]

## 2018-03-21 NOTE — PROGRESS NOTE ADULT - SUBJECTIVE AND OBJECTIVE BOX
Vascular Surgery Progress Note  C Team [id number]     SUBJECTIVE/interval events:  -denies SOB on RA  -complaints  -awaiting medical optimization for bypass      OBJECTIVE:     ** VITAL SIGNS / I&O's **    Vital Signs Last 24 Hrs  T(C): 36.6 (21 Mar 2018 05:10), Max: 36.9 (20 Mar 2018 20:49)  T(F): 97.8 (21 Mar 2018 05:10), Max: 98.4 (20 Mar 2018 20:49)  HR: 68 (21 Mar 2018 05:10) (66 - 82)  BP: 157/74 (21 Mar 2018 05:10) (143/72 - 157/74)  BP(mean): --  RR: 18 (21 Mar 2018 05:10) (18 - 18)  SpO2: 96% (21 Mar 2018 05:10) (93% - 96%)      I&O's Summary    20 Mar 2018 07:01  -  21 Mar 2018 07:00  --------------------------------------------------------  IN: 240 mL / OUT: 2075 mL / NET: -1835 mL    21 Mar 2018 07:01  -  21 Mar 2018 11:26  --------------------------------------------------------  IN: 0 mL / OUT: 410 mL / NET: -410 mL              ** PHYSICAL EXAM **    Gen: Alert, NAD, off supplemental oxygen  Pulm: non-labored breathing, airway patent  Ext: LLE w/ kerlex dressing in place; s/p BKA      ** LABS **                          12.6   5.20  )-----------( 291      ( 21 Mar 2018 06:45 )             38.4                         10.9   4.66  )-----------( 231      ( 19 Mar 2018 06:21 )             35.3     19 Mar 2018 06:21    142    |  101    |  53     ----------------------------<  65     5.3     |  24     |  4.17     Ca    8.9        19 Mar 2018 06:21  Phos  4.8       19 Mar 2018 06:21  Mg     2.1       19 Mar 2018 06:21        CAPILLARY BLOOD GLUCOSE      POCT Blood Glucose.: 89 mg/dL (19 Mar 2018 08:15)  POCT Blood Glucose.: 104 mg/dL (18 Mar 2018 22:17)  POCT Blood Glucose.: 128 mg/dL (18 Mar 2018 17:26)  POCT Blood Glucose.: 106 mg/dL (18 Mar 2018 12:22)              MEDICATIONS  (STANDING):  ALBUTerol/ipratropium for Nebulization 3 milliLiter(s) Nebulizer every 6 hours  aspirin enteric coated 81 milliGRAM(s) Oral daily  atorvastatin 80 milliGRAM(s) Oral at bedtime  buMETAnide Infusion 2.5 mG/Hr (25 mL/Hr) IV Continuous <Continuous>  cyanocobalamin 1000 MICROGram(s) Oral daily  dextrose 5%. 1000 milliLiter(s) (50 mL/Hr) IV Continuous <Continuous>  dextrose 50% Injectable 25 Gram(s) IV Push once  dextrose 50% Injectable 25 Gram(s) IV Push once  heparin  Injectable 5000 Unit(s) SubCutaneous every 8 hours  influenza   Vaccine 0.5 milliLiter(s) IntraMuscular once  insulin lispro (HumaLOG) corrective regimen sliding scale   SubCutaneous three times a day before meals  labetalol 200 milliGRAM(s) Oral three times a day  multivitamin 1 Tablet(s) Oral daily  NIFEdipine XL 90 milliGRAM(s) Oral daily  silver sulfADIAZINE 1% Cream 1 Application(s) Topical daily  sodium bicarbonate 650 milliGRAM(s) Oral every 12 hours    MEDICATIONS  (PRN):  acetaminophen   Tablet. 650 milliGRAM(s) Oral every 6 hours PRN Moderate Pain (4 - 6)  dextrose Gel 1 Dose(s) Oral once PRN Blood Glucose LESS THAN 70 milliGRAM(s)/deciliter  glucagon  Injectable 1 milliGRAM(s) IntraMuscular once PRN Glucose LESS THAN 70 milligrams/deciliter Vascular Surgery Progress Note  C Team [id number]     SUBJECTIVE/interval events:  -denies SOB on RA  -complaints  -awaiting medical optimization for bypass      OBJECTIVE:     ** VITAL SIGNS / I&O's **    Vital Signs Last 24 Hrs  T(C): 36.6 (21 Mar 2018 05:10), Max: 36.9 (20 Mar 2018 20:49)  T(F): 97.8 (21 Mar 2018 05:10), Max: 98.4 (20 Mar 2018 20:49)  HR: 68 (21 Mar 2018 05:10) (66 - 82)  BP: 157/74 (21 Mar 2018 05:10) (143/72 - 157/74)  BP(mean): --  RR: 18 (21 Mar 2018 05:10) (18 - 18)  SpO2: 96% (21 Mar 2018 05:10) (93% - 96%)      I&O's Summary    20 Mar 2018 07:01  -  21 Mar 2018 07:00  --------------------------------------------------------  IN: 240 mL / OUT: 2075 mL / NET: -1835 mL    21 Mar 2018 07:01  -  21 Mar 2018 11:26  --------------------------------------------------------  IN: 0 mL / OUT: 410 mL / NET: -410 mL              ** PHYSICAL EXAM **    Gen: Alert, NAD, off supplemental oxygen  Pulm: non-labored breathing, airway patent  Ext: LLE w/ kerlex dressing in place; s/p BKA      ** LABS **                          12.6   5.20  )-----------( 291      ( 21 Mar 2018 06:45 )             38.4                         10.9   4.66  )-----------( 231      ( 19 Mar 2018 06:21 )             35.3     19 Mar 2018 06:21    142    |  101    |  53     ----------------------------<  65     5.3     |  24     |  4.17     Ca    8.9        19 Mar 2018 06:21  Phos  4.8       19 Mar 2018 06:21  Mg     2.1       19 Mar 2018 06:21        CAPILLARY BLOOD GLUCOSE      POCT Blood Glucose.: 89 mg/dL (19 Mar 2018 08:15)  POCT Blood Glucose.: 104 mg/dL (18 Mar 2018 22:17)  POCT Blood Glucose.: 128 mg/dL (18 Mar 2018 17:26)  POCT Blood Glucose.: 106 mg/dL (18 Mar 2018 12:22)      MEDICATIONS  (STANDING):  aspirin enteric coated 81 milliGRAM(s) Oral daily  atorvastatin 80 milliGRAM(s) Oral at bedtime  cyanocobalamin 1000 MICROGram(s) Oral daily  dextrose 5%. 1000 milliLiter(s) (50 mL/Hr) IV Continuous <Continuous>  dextrose 50% Injectable 25 Gram(s) IV Push once  dextrose 50% Injectable 25 Gram(s) IV Push once  furosemide    Tablet 40 milliGRAM(s) Oral daily  heparin  Injectable 5000 Unit(s) SubCutaneous every 8 hours  influenza   Vaccine 0.5 milliLiter(s) IntraMuscular once  insulin lispro (HumaLOG) corrective regimen sliding scale   SubCutaneous three times a day before meals  labetalol 200 milliGRAM(s) Oral three times a day  multivitamin 1 Tablet(s) Oral daily  NIFEdipine XL 90 milliGRAM(s) Oral daily  silver sulfADIAZINE 1% Cream 1 Application(s) Topical daily  sodium bicarbonate 650 milliGRAM(s) Oral every 12 hours    MEDICATIONS  (PRN):  acetaminophen   Tablet. 650 milliGRAM(s) Oral every 6 hours PRN Moderate Pain (4 - 6)  benzocaine 15 mG/menthol 3.6 mG Lozenge 1 Lozenge Oral every 2 hours PRN Sore Throat  dextrose Gel 1 Dose(s) Oral once PRN Blood Glucose LESS THAN 70 milliGRAM(s)/deciliter  glucagon  Injectable 1 milliGRAM(s) IntraMuscular once PRN Glucose LESS THAN 70 milligrams/deciliter      Imaging:  < from: Xray Chest 2 Views PA/Lat (03.19.18 @ 18:02) >  IMPRESSION:  Moderate right and small left pleural effusions with likely   associated passive atelectasis. Underlying atelectasis of other cause   and/or pneumonia in the appropriate clinical context is not excluded. No   pneumonia was reported on the CT scan, however.    < end of copied text >    < from: CT Chest No Cont (03.18.18 @ 09:50) >  LUNGS, PLEURA AND LARGE AIRWAYS: Large bilateral pleural effusions   resulting in near complete compressive atelectasis involving the right   middle and both lower lobes. Patent central airways.   VESSELS: Atherosclerotic changes of the aorta and coronary arteries.  HEART: Heart size is enlarged.No pericardial effusion. Mitral annular and   aortic valvular calcifications. MEDIASTINUM AND ROBERT: Calcified left   hilar lymph node is noted.  CHEST WALL AND LOWER NECK: Within normal limits.  VISUALIZED UPPER ABDOMEN: Within normal limits.  BONES: Within normal limits.    IMPRESSION:     Large bilateral pleural effusions resulting compressive atelectasis as   described above.      < end of copied text >

## 2018-03-21 NOTE — PROGRESS NOTE ADULT - PROBLEM SELECTOR PLAN 1
-Pulmonary edema + significant pleural effusions bilaterally despite being on IV lasix now resolving   -C/w Bumex drip; lower infusion rate today as SCr beginning to trend up & patient diuresing well with decreasing PLEFs on POCUS  -continue to monitor electrolytes on Bumex; wnl  -Monitor I's and O's & daily weight -Pulmonary edema + significant pleural effusions bilaterally despite being on IV lasix now resolving   -d/c Bumex drip as SCr uptrending; diuresing well with decreasing PLEFs on POCUS  -continue to monitor electrolytes on Bumex; wnl  -Monitor I's and O's & daily weight -Pulmonary edema + significant pleural effusions bilaterally despite being on IV lasix now resolving   -d/c Bumex drip as SCr uptrending; diuresing well with decreasing PLEFs on POCUS  -start PO Lasix   -continue to monitor electrolytes wnl  -Monitor I's and O's & daily weight

## 2018-03-21 NOTE — PROGRESS NOTE ADULT - PROBLEM SELECTOR PLAN 5
-s/p angiogram. With multi vessel disease s/p LE dopplers & vein mapping for Femoral-popliteal bypass. KENN .71 on left.   -c/w ASA and statin

## 2018-03-21 NOTE — PROGRESS NOTE ADULT - ASSESSMENT
63M PMH DM on januvia, HTN, CKD, R BKA in 2009 presented with cellulitis and sepsis c/b ATN which improvedt found to have severe LLE SFA disease, now pending LLE fem-pop bypass.

## 2018-03-21 NOTE — PROGRESS NOTE ADULT - PROBLEM SELECTOR PLAN 1
Pt. with JANY on CKD in the setting of infection and diarrhea. CKD in the setting of long-standing DM. Scr was 1.5 in 3/2017, increased to 2.30 in 7/2017. Scr on admission (2/12) was elevated at 3.81, increased to 6.3 on 2/23 and has now improved. Pt. also underwent vascular study/left leg angiogram on 3/8. Scr stable at 4.45 today. Pt. at increased risk for radiocontrast nephropathy. Monitor BMP and urine output. Avoid any potential nephrotoxins. Pt. with JANY on CKD in the setting of infection and diarrhea. CKD in the setting of long-standing DM. Scr was 1.5 in 3/2017, increased to 2.30 in 7/2017. Scr on admission (2/12) was elevated at 3.81, increased to 6.3 on 2/23 and has now improved. Pt. also underwent vascular study/left leg angiogram on 3/8. Scr stable at 4.45 today. Pt. at increased risk for radiocontrast nephropathy. Pt. aware and understands increased risk of worsening renal function after contrast procedure, and is agreeable to undergo HD if needed after the procedure. Monitor BMP and urine output. Avoid any potential nephrotoxins.

## 2018-03-21 NOTE — PROGRESS NOTE ADULT - PROBLEM SELECTOR PLAN 2
Pt with hypervolemia in the setting of CKD. Pt. responsive to IV diuretic therapy. Recommend to decrease dose of diuretics today. Pt clinically stable. Labs reviewed. No acute indication for renal replacement therapy at this time. Monitor weight daily.

## 2018-03-21 NOTE — PROGRESS NOTE ADULT - SUBJECTIVE AND OBJECTIVE BOX
Patient is a 63y old  Male who presents with a chief complaint of 63M PMH DM, HTN, CKD p/w L foot pain x 3 days. (19 Feb 2018 10:39)      SUBJECTIVE / OVERNIGHT EVENTS: No acute overnight events. Saturating 93-96% on RA; output -2.0 L overnight.     MEDICATIONS  (STANDING):  aspirin enteric coated 81 milliGRAM(s) Oral daily  atorvastatin 80 milliGRAM(s) Oral at bedtime  buMETAnide Infusion 1.5 mG/Hr (15 mL/Hr) IV Continuous <Continuous>  cyanocobalamin 1000 MICROGram(s) Oral daily  dextrose 5%. 1000 milliLiter(s) (50 mL/Hr) IV Continuous <Continuous>  dextrose 50% Injectable 25 Gram(s) IV Push once  dextrose 50% Injectable 25 Gram(s) IV Push once  heparin  Injectable 5000 Unit(s) SubCutaneous every 8 hours  influenza   Vaccine 0.5 milliLiter(s) IntraMuscular once  insulin lispro (HumaLOG) corrective regimen sliding scale   SubCutaneous three times a day before meals  labetalol 200 milliGRAM(s) Oral three times a day  multivitamin 1 Tablet(s) Oral daily  NIFEdipine XL 90 milliGRAM(s) Oral daily  silver sulfADIAZINE 1% Cream 1 Application(s) Topical daily  sodium bicarbonate 650 milliGRAM(s) Oral every 12 hours    MEDICATIONS  (PRN):  acetaminophen   Tablet. 650 milliGRAM(s) Oral every 6 hours PRN Moderate Pain (4 - 6)  benzocaine 15 mG/menthol 3.6 mG Lozenge 1 Lozenge Oral every 2 hours PRN Sore Throat  dextrose Gel 1 Dose(s) Oral once PRN Blood Glucose LESS THAN 70 milliGRAM(s)/deciliter  glucagon  Injectable 1 milliGRAM(s) IntraMuscular once PRN Glucose LESS THAN 70 milligrams/deciliter        CAPILLARY BLOOD GLUCOSE      POCT Blood Glucose.: 135 mg/dL (20 Mar 2018 21:44)  POCT Blood Glucose.: 110 mg/dL (20 Mar 2018 17:14)  POCT Blood Glucose.: 161 mg/dL (20 Mar 2018 12:12)  POCT Blood Glucose.: 90 mg/dL (20 Mar 2018 08:25)    I&O's Summary    20 Mar 2018 07:01  -  21 Mar 2018 07:00  --------------------------------------------------------  IN: 240 mL / OUT: 2075 mL / NET: -1835 mL        PHYSICAL EXAM:  GENERAL: no acute respiratory distress   HEAD:  Atraumatic, Normocephalic  EYES: EOMI, PERRLA, conjunctiva and sclera clear  NECK: Supple, No JVD  CHEST/LUNG: Clear to auscultation bilaterally; No wheeze  HEART: Regular rate and rhythm; No murmurs, rubs, or gallops  ABDOMEN: Soft, Nontender, Nondistended; Bowel sounds present  EXTREMITIES:  2+ Peripheral Pulses, No clubbing, cyanosis, or edema s/p R BKA, unable to palpate DP pulse on left. Left foot wounds c/d/i  PSYCH: AAOx3  NEUROLOGY: non-focal  SKIN: No rashes or lesions    LABS:    WBC Trend: 5.01<--, 4.66<--, 4.74<--  Hb Trend: 12.1<--, 10.9<--, 9.7<--, 9.6<--, 9.8<--  Plt Trend: 317<--, 231<--, 263<--, 238<--, 235<--  03-20    140  |  97<L>  |  60<H>  ----------------------------<  109<H>  4.5   |  28  |  4.29<H>    Ca    8.9      20 Mar 2018 18:40  Phos  4.4     03-20  Mg     1.8     03-20      Creatinine Trend: 4.29<--, 4.34<--, 4.22<--, 4.17<--, 4.17<--, 4.21<--    CARDIAC MARKERS ( 20 Mar 2018 06:25 )  Trop x     / CK 99 u/L / CKMB x               Microbiology:  Urine Cx:  Blood Cx:    RADIOLOGY & ADDITIONAL TESTS:  X- Ray:  CT:  Ultrasound:  [ ] imaging personally reviewed and interpreted by me    Consultant(s) Notes Reviewed:      Care Discussed with Consultants/Other Providers: Patient is a 63y old  Male who presents with a chief complaint of 63M PMH DM, HTN, CKD p/w L foot pain x 3 days. (19 Feb 2018 10:39)      SUBJECTIVE / OVERNIGHT EVENTS: No acute overnight events. Saturating 93-96% on RA; output -2.0 L overnight.     MEDICATIONS  (STANDING):  aspirin enteric coated 81 milliGRAM(s) Oral daily  atorvastatin 80 milliGRAM(s) Oral at bedtime  buMETAnide Infusion 1.5 mG/Hr (15 mL/Hr) IV Continuous <Continuous>  cyanocobalamin 1000 MICROGram(s) Oral daily  dextrose 5%. 1000 milliLiter(s) (50 mL/Hr) IV Continuous <Continuous>  dextrose 50% Injectable 25 Gram(s) IV Push once  dextrose 50% Injectable 25 Gram(s) IV Push once  heparin  Injectable 5000 Unit(s) SubCutaneous every 8 hours  influenza   Vaccine 0.5 milliLiter(s) IntraMuscular once  insulin lispro (HumaLOG) corrective regimen sliding scale   SubCutaneous three times a day before meals  labetalol 200 milliGRAM(s) Oral three times a day  multivitamin 1 Tablet(s) Oral daily  NIFEdipine XL 90 milliGRAM(s) Oral daily  silver sulfADIAZINE 1% Cream 1 Application(s) Topical daily  sodium bicarbonate 650 milliGRAM(s) Oral every 12 hours    MEDICATIONS  (PRN):  acetaminophen   Tablet. 650 milliGRAM(s) Oral every 6 hours PRN Moderate Pain (4 - 6)  benzocaine 15 mG/menthol 3.6 mG Lozenge 1 Lozenge Oral every 2 hours PRN Sore Throat  dextrose Gel 1 Dose(s) Oral once PRN Blood Glucose LESS THAN 70 milliGRAM(s)/deciliter  glucagon  Injectable 1 milliGRAM(s) IntraMuscular once PRN Glucose LESS THAN 70 milligrams/deciliter        CAPILLARY BLOOD GLUCOSE      POCT Blood Glucose.: 135 mg/dL (20 Mar 2018 21:44)  POCT Blood Glucose.: 110 mg/dL (20 Mar 2018 17:14)  POCT Blood Glucose.: 161 mg/dL (20 Mar 2018 12:12)  POCT Blood Glucose.: 90 mg/dL (20 Mar 2018 08:25)    I&O's Summary    20 Mar 2018 07:01  -  21 Mar 2018 07:00  --------------------------------------------------------  IN: 240 mL / OUT: 2075 mL / NET: -1835 mL        PHYSICAL EXAM:  GENERAL: no acute respiratory distress   HEAD:  Atraumatic, Normocephalic  EYES: EOMI, PERRLA  NECK: Supple, No JVD  CHEST/LUNG: mildly decreased sounds at bases otherwise cta b/l; No wheeze  HEART: Regular rate and rhythm; No murmurs, rubs, or gallops  ABDOMEN: Soft, Nontender, Nondistended; Bowel sounds present  EXTREMITIES:  2+ Peripheral Pulses, No clubbing, cyanosis, or edema s/p R BKA, unable to palpate DP pulse on left. Left foot wounds c/d/i  PSYCH: AAOx3  NEUROLOGY: non-focal  SKIN: No rashes or lesions    LABS:    WBC Trend: 5.01<--, 4.66<--, 4.74<--  Hb Trend: 12.1<--, 10.9<--, 9.7<--, 9.6<--, 9.8<--  Plt Trend: 317<--, 231<--, 263<--, 238<--, 235<--  03-20    140  |  97<L>  |  60<H>  ----------------------------<  109<H>  4.5   |  28  |  4.29<H>    Ca    8.9      20 Mar 2018 18:40  Phos  4.4     03-20  Mg     1.8     03-20      Creatinine Trend: 4.29<--, 4.34<--, 4.22<--, 4.17<--, 4.17<--, 4.21<--    CARDIAC MARKERS ( 20 Mar 2018 06:25 )  Trop x     / CK 99 u/L / CKMB x           Consultant(s) Notes Reviewed:  Podiatry     Care Discussed with Consultants/Other Providers: Vascular Sx Patient is a 63y old  Male who presents with a chief complaint of 63M PMH DM, HTN, CKD p/w L foot pain x 3 days. (19 Feb 2018 10:39)      SUBJECTIVE / OVERNIGHT EVENTS: No acute overnight events. Saturating 93-96% on RA; output -2.0 L overnight.     MEDICATIONS  (STANDING):  aspirin enteric coated 81 milliGRAM(s) Oral daily  atorvastatin 80 milliGRAM(s) Oral at bedtime  buMETAnide Infusion 1.5 mG/Hr (15 mL/Hr) IV Continuous <Continuous>  cyanocobalamin 1000 MICROGram(s) Oral daily  dextrose 5%. 1000 milliLiter(s) (50 mL/Hr) IV Continuous <Continuous>  dextrose 50% Injectable 25 Gram(s) IV Push once  dextrose 50% Injectable 25 Gram(s) IV Push once  heparin  Injectable 5000 Unit(s) SubCutaneous every 8 hours  influenza   Vaccine 0.5 milliLiter(s) IntraMuscular once  insulin lispro (HumaLOG) corrective regimen sliding scale   SubCutaneous three times a day before meals  labetalol 200 milliGRAM(s) Oral three times a day  multivitamin 1 Tablet(s) Oral daily  NIFEdipine XL 90 milliGRAM(s) Oral daily  silver sulfADIAZINE 1% Cream 1 Application(s) Topical daily  sodium bicarbonate 650 milliGRAM(s) Oral every 12 hours    MEDICATIONS  (PRN):  acetaminophen   Tablet. 650 milliGRAM(s) Oral every 6 hours PRN Moderate Pain (4 - 6)  benzocaine 15 mG/menthol 3.6 mG Lozenge 1 Lozenge Oral every 2 hours PRN Sore Throat  dextrose Gel 1 Dose(s) Oral once PRN Blood Glucose LESS THAN 70 milliGRAM(s)/deciliter  glucagon  Injectable 1 milliGRAM(s) IntraMuscular once PRN Glucose LESS THAN 70 milligrams/deciliter        CAPILLARY BLOOD GLUCOSE      POCT Blood Glucose.: 135 mg/dL (20 Mar 2018 21:44)  POCT Blood Glucose.: 110 mg/dL (20 Mar 2018 17:14)  POCT Blood Glucose.: 161 mg/dL (20 Mar 2018 12:12)  POCT Blood Glucose.: 90 mg/dL (20 Mar 2018 08:25)    I&O's Summary    20 Mar 2018 07:01  -  21 Mar 2018 07:00  --------------------------------------------------------  IN: 240 mL / OUT: 2075 mL / NET: -1835 mL        PHYSICAL EXAM:  GENERAL: no acute respiratory distress   HEAD:  Atraumatic, Normocephalic  EYES: EOMI, PERRLA  NECK: Supple, No JVD  CHEST/LUNG: fine bibasilar rales otherwise cta b/l; No wheeze  HEART: Regular rate and rhythm; No murmurs, rubs, or gallops  ABDOMEN: Soft, Nontender, Nondistended; Bowel sounds present  EXTREMITIES:  2+ Peripheral Pulses, No clubbing, cyanosis, or edema s/p R BKA, unable to palpate DP pulse on left. Left foot wounds c/d/i  PSYCH: AAOx3  NEUROLOGY: non-focal  SKIN: No rashes or lesions    LABS:    WBC Trend: 5.01<--, 4.66<--, 4.74<--  Hb Trend: 12.1<--, 10.9<--, 9.7<--, 9.6<--, 9.8<--  Plt Trend: 317<--, 231<--, 263<--, 238<--, 235<--  03-20    140  |  97<L>  |  60<H>  ----------------------------<  109<H>  4.5   |  28  |  4.29<H>    Ca    8.9      20 Mar 2018 18:40  Phos  4.4     03-20  Mg     1.8     03-20      Creatinine Trend: 4.29<--, 4.34<--, 4.22<--, 4.17<--, 4.17<--, 4.21<--    CARDIAC MARKERS ( 20 Mar 2018 06:25 )  Trop x     / CK 99 u/L / CKMB x         POCUS Lungs:     Consultant(s) Notes Reviewed:  Podiatry     Care Discussed with Consultants/Other Providers: Vascular Sx Patient is a 63y old  Male who presents with a chief complaint of 63M PMH DM, HTN, CKD p/w L foot pain x 3 days. (19 Feb 2018 10:39)      SUBJECTIVE / OVERNIGHT EVENTS: No acute overnight events. Saturating 93-96% on RA; output -2.0 L overnight.     MEDICATIONS  (STANDING):  aspirin enteric coated 81 milliGRAM(s) Oral daily  atorvastatin 80 milliGRAM(s) Oral at bedtime  buMETAnide Infusion 1.5 mG/Hr (15 mL/Hr) IV Continuous <Continuous>  cyanocobalamin 1000 MICROGram(s) Oral daily  dextrose 5%. 1000 milliLiter(s) (50 mL/Hr) IV Continuous <Continuous>  dextrose 50% Injectable 25 Gram(s) IV Push once  dextrose 50% Injectable 25 Gram(s) IV Push once  heparin  Injectable 5000 Unit(s) SubCutaneous every 8 hours  influenza   Vaccine 0.5 milliLiter(s) IntraMuscular once  insulin lispro (HumaLOG) corrective regimen sliding scale   SubCutaneous three times a day before meals  labetalol 200 milliGRAM(s) Oral three times a day  multivitamin 1 Tablet(s) Oral daily  NIFEdipine XL 90 milliGRAM(s) Oral daily  silver sulfADIAZINE 1% Cream 1 Application(s) Topical daily  sodium bicarbonate 650 milliGRAM(s) Oral every 12 hours    MEDICATIONS  (PRN):  acetaminophen   Tablet. 650 milliGRAM(s) Oral every 6 hours PRN Moderate Pain (4 - 6)  benzocaine 15 mG/menthol 3.6 mG Lozenge 1 Lozenge Oral every 2 hours PRN Sore Throat  dextrose Gel 1 Dose(s) Oral once PRN Blood Glucose LESS THAN 70 milliGRAM(s)/deciliter  glucagon  Injectable 1 milliGRAM(s) IntraMuscular once PRN Glucose LESS THAN 70 milligrams/deciliter        CAPILLARY BLOOD GLUCOSE      POCT Blood Glucose.: 135 mg/dL (20 Mar 2018 21:44)  POCT Blood Glucose.: 110 mg/dL (20 Mar 2018 17:14)  POCT Blood Glucose.: 161 mg/dL (20 Mar 2018 12:12)  POCT Blood Glucose.: 90 mg/dL (20 Mar 2018 08:25)    I&O's Summary    20 Mar 2018 07:01  -  21 Mar 2018 07:00  --------------------------------------------------------  IN: 240 mL / OUT: 2075 mL / NET: -1835 mL        PHYSICAL EXAM:  GENERAL: no acute respiratory distress   HEAD:  Atraumatic, Normocephalic  EYES: EOMI, PERRLA  NECK: Supple, No JVD  CHEST/LUNG: fine bibasilar rales otherwise cta b/l; No wheeze  HEART: Regular rate and rhythm; No murmurs, rubs, or gallops  ABDOMEN: Soft, Nontender, Nondistended; Bowel sounds present  EXTREMITIES:  2+ Peripheral Pulses, No clubbing, cyanosis, or edema s/p R BKA, unable to palpate DP pulse on left. Left foot wounds c/d/i  PSYCH: AAOx3  NEUROLOGY: non-focal  SKIN: No rashes or lesions    LABS:    WBC Trend: 5.01<--, 4.66<--, 4.74<--  Hb Trend: 12.1<--, 10.9<--, 9.7<--, 9.6<--, 9.8<--  Plt Trend: 317<--, 231<--, 263<--, 238<--, 235<--  03-20    140  |  97<L>  |  60<H>  ----------------------------<  109<H>  4.5   |  28  |  4.29<H>    Ca    8.9      20 Mar 2018 18:40  Phos  4.4     03-20  Mg     1.8     03-20      Creatinine Trend: 4.29<--, 4.34<--, 4.22<--, 4.17<--, 4.17<--, 4.21<--    CARDIAC MARKERS ( 20 Mar 2018 06:25 )  Trop x     / CK 99 u/L / CKMB x         POCUS Lungs: A-line predominant bilaterally; scattered B lines on right; Trace PLEF left/Small PLEF right.     Consultant(s) Notes Reviewed:  Podiatry     Care Discussed with Consultants/Other Providers: Vascular Sx Patient is a 63y old  Male who presents with a chief complaint of 63M PMH DM, HTN, CKD p/w L foot pain x 3 days. (19 Feb 2018 10:39)      SUBJECTIVE / OVERNIGHT EVENTS: No acute overnight events. Saturating 93-96% on RA; output -2.0 L    MEDICATIONS  (STANDING):  aspirin enteric coated 81 milliGRAM(s) Oral daily  atorvastatin 80 milliGRAM(s) Oral at bedtime  buMETAnide Infusion 1.5 mG/Hr (15 mL/Hr) IV Continuous <Continuous>  cyanocobalamin 1000 MICROGram(s) Oral daily  dextrose 5%. 1000 milliLiter(s) (50 mL/Hr) IV Continuous <Continuous>  dextrose 50% Injectable 25 Gram(s) IV Push once  dextrose 50% Injectable 25 Gram(s) IV Push once  heparin  Injectable 5000 Unit(s) SubCutaneous every 8 hours  influenza   Vaccine 0.5 milliLiter(s) IntraMuscular once  insulin lispro (HumaLOG) corrective regimen sliding scale   SubCutaneous three times a day before meals  labetalol 200 milliGRAM(s) Oral three times a day  multivitamin 1 Tablet(s) Oral daily  NIFEdipine XL 90 milliGRAM(s) Oral daily  silver sulfADIAZINE 1% Cream 1 Application(s) Topical daily  sodium bicarbonate 650 milliGRAM(s) Oral every 12 hours    MEDICATIONS  (PRN):  acetaminophen   Tablet. 650 milliGRAM(s) Oral every 6 hours PRN Moderate Pain (4 - 6)  benzocaine 15 mG/menthol 3.6 mG Lozenge 1 Lozenge Oral every 2 hours PRN Sore Throat  dextrose Gel 1 Dose(s) Oral once PRN Blood Glucose LESS THAN 70 milliGRAM(s)/deciliter  glucagon  Injectable 1 milliGRAM(s) IntraMuscular once PRN Glucose LESS THAN 70 milligrams/deciliter        CAPILLARY BLOOD GLUCOSE      POCT Blood Glucose.: 135 mg/dL (20 Mar 2018 21:44)  POCT Blood Glucose.: 110 mg/dL (20 Mar 2018 17:14)  POCT Blood Glucose.: 161 mg/dL (20 Mar 2018 12:12)  POCT Blood Glucose.: 90 mg/dL (20 Mar 2018 08:25)    I&O's Summary    20 Mar 2018 07:01  -  21 Mar 2018 07:00  --------------------------------------------------------  IN: 240 mL / OUT: 2075 mL / NET: -1835 mL        PHYSICAL EXAM:  GENERAL: no acute respiratory distress   HEAD:  Atraumatic, Normocephalic  EYES: EOMI, PERRLA  NECK: Supple, No JVD  CHEST/LUNG: fine bibasilar rales otherwise cta b/l; No wheeze  HEART: Regular rate and rhythm; No murmurs, rubs, or gallops  ABDOMEN: Soft, Nontender, Nondistended; Bowel sounds present  EXTREMITIES:  2+ Peripheral Pulses, No clubbing, cyanosis, or edema s/p R BKA, unable to palpate DP pulse on left. Left foot wounds c/d/i  PSYCH: AAOx3  NEUROLOGY: non-focal  SKIN: No rashes or lesions    LABS:    WBC Trend: 5.01<--, 4.66<--, 4.74<--  Hb Trend: 12.1<--, 10.9<--, 9.7<--, 9.6<--, 9.8<--  Plt Trend: 317<--, 231<--, 263<--, 238<--, 235<--  03-20    140  |  97<L>  |  60<H>  ----------------------------<  109<H>  4.5   |  28  |  4.29<H>    Ca    8.9      20 Mar 2018 18:40  Phos  4.4     03-20  Mg     1.8     03-20      Creatinine Trend: 4.29<--, 4.34<--, 4.22<--, 4.17<--, 4.17<--, 4.21<--    CARDIAC MARKERS ( 20 Mar 2018 06:25 )  Trop x     / CK 99 u/L / CKMB x         POCUS Lungs: A-line predominant bilaterally; scattered B lines on right; Trace PLEF left/Small PLEF right.     Consultant(s) Notes Reviewed:  Podiatry     Care Discussed with Consultants/Other Providers: Vascular Sx

## 2018-03-22 LAB
BUN SERPL-MCNC: 74 MG/DL — HIGH (ref 7–23)
CALCIUM SERPL-MCNC: 8.9 MG/DL — SIGNIFICANT CHANGE UP (ref 8.4–10.5)
CHLORIDE SERPL-SCNC: 97 MMOL/L — LOW (ref 98–107)
CO2 SERPL-SCNC: 30 MMOL/L — SIGNIFICANT CHANGE UP (ref 22–31)
CREAT SERPL-MCNC: 4.66 MG/DL — HIGH (ref 0.5–1.3)
GLUCOSE BLDC GLUCOMTR-MCNC: 162 MG/DL — HIGH (ref 70–99)
GLUCOSE BLDC GLUCOMTR-MCNC: 80 MG/DL — SIGNIFICANT CHANGE UP (ref 70–99)
GLUCOSE BLDC GLUCOMTR-MCNC: 87 MG/DL — SIGNIFICANT CHANGE UP (ref 70–99)
GLUCOSE BLDC GLUCOMTR-MCNC: 90 MG/DL — SIGNIFICANT CHANGE UP (ref 70–99)
GLUCOSE SERPL-MCNC: 92 MG/DL — SIGNIFICANT CHANGE UP (ref 70–99)
HCT VFR BLD CALC: 35.4 % — LOW (ref 39–50)
HGB BLD-MCNC: 11.4 G/DL — LOW (ref 13–17)
MAGNESIUM SERPL-MCNC: 1.9 MG/DL — SIGNIFICANT CHANGE UP (ref 1.6–2.6)
MCHC RBC-ENTMCNC: 27.2 PG — SIGNIFICANT CHANGE UP (ref 27–34)
MCHC RBC-ENTMCNC: 32.2 % — SIGNIFICANT CHANGE UP (ref 32–36)
MCV RBC AUTO: 84.5 FL — SIGNIFICANT CHANGE UP (ref 80–100)
NRBC # FLD: 0 — SIGNIFICANT CHANGE UP
PHOSPHATE SERPL-MCNC: 6 MG/DL — HIGH (ref 2.5–4.5)
PLATELET # BLD AUTO: 281 K/UL — SIGNIFICANT CHANGE UP (ref 150–400)
PMV BLD: 10.7 FL — SIGNIFICANT CHANGE UP (ref 7–13)
POTASSIUM SERPL-MCNC: 4.6 MMOL/L — SIGNIFICANT CHANGE UP (ref 3.5–5.3)
POTASSIUM SERPL-SCNC: 4.6 MMOL/L — SIGNIFICANT CHANGE UP (ref 3.5–5.3)
RBC # BLD: 4.19 M/UL — LOW (ref 4.2–5.8)
RBC # FLD: 14.4 % — SIGNIFICANT CHANGE UP (ref 10.3–14.5)
SODIUM SERPL-SCNC: 142 MMOL/L — SIGNIFICANT CHANGE UP (ref 135–145)
WBC # BLD: 5.48 K/UL — SIGNIFICANT CHANGE UP (ref 3.8–10.5)
WBC # FLD AUTO: 5.48 K/UL — SIGNIFICANT CHANGE UP (ref 3.8–10.5)

## 2018-03-22 PROCEDURE — 99233 SBSQ HOSP IP/OBS HIGH 50: CPT | Mod: GC

## 2018-03-22 PROCEDURE — 71045 X-RAY EXAM CHEST 1 VIEW: CPT | Mod: 26

## 2018-03-22 PROCEDURE — 99232 SBSQ HOSP IP/OBS MODERATE 35: CPT

## 2018-03-22 RX ADMIN — Medication 1 APPLICATION(S): at 11:55

## 2018-03-22 RX ADMIN — Medication 40 MILLIGRAM(S): at 05:14

## 2018-03-22 RX ADMIN — Medication 90 MILLIGRAM(S): at 05:14

## 2018-03-22 RX ADMIN — HEPARIN SODIUM 5000 UNIT(S): 5000 INJECTION INTRAVENOUS; SUBCUTANEOUS at 21:46

## 2018-03-22 RX ADMIN — Medication 650 MILLIGRAM(S): at 05:14

## 2018-03-22 RX ADMIN — HEPARIN SODIUM 5000 UNIT(S): 5000 INJECTION INTRAVENOUS; SUBCUTANEOUS at 05:14

## 2018-03-22 RX ADMIN — PREGABALIN 1000 MICROGRAM(S): 225 CAPSULE ORAL at 11:54

## 2018-03-22 RX ADMIN — ATORVASTATIN CALCIUM 80 MILLIGRAM(S): 80 TABLET, FILM COATED ORAL at 21:45

## 2018-03-22 RX ADMIN — Medication 200 MILLIGRAM(S): at 05:14

## 2018-03-22 RX ADMIN — Medication 1: at 11:55

## 2018-03-22 RX ADMIN — Medication 200 MILLIGRAM(S): at 13:30

## 2018-03-22 RX ADMIN — HEPARIN SODIUM 5000 UNIT(S): 5000 INJECTION INTRAVENOUS; SUBCUTANEOUS at 13:30

## 2018-03-22 RX ADMIN — Medication 1 TABLET(S): at 11:54

## 2018-03-22 RX ADMIN — Medication 81 MILLIGRAM(S): at 11:54

## 2018-03-22 RX ADMIN — Medication 200 MILLIGRAM(S): at 21:45

## 2018-03-22 NOTE — PROGRESS NOTE ADULT - PROBLEM SELECTOR PLAN 4
- Left foot with cellulitis with resulting sepsis which is now improved since admission; S/p angiogram - will need fem-pop bypass (postponed 2/2 respiratory status in setting of fluid overload), now cardiopulmonary optimized    - Consented via surrogate, Eres (patient's brother) & patient agreeable to surgery.   - S/p vancomycin and zosyn. Completed Unasyn on 2/22.

## 2018-03-22 NOTE — PROGRESS NOTE ADULT - PROBLEM SELECTOR PLAN 2
- Creatinine stable CKD IV in the setting of  ATN from sepsis & contrast induced nephropathy   - continuing to monitor urine outputs, trend sCr daily (likely pt's new baseline)  - pt continues to make urine, electrolytes wnl - trend daily  - no indications for HD at this time, will need continued Nephrology follow-up upon dc

## 2018-03-22 NOTE — PROGRESS NOTE ADULT - ASSESSMENT
63M PMHx DM, s/p R BKA, now presenting with Diabetic foot soft tissue infection, w/ angiography showing arterial stenosis of LLE, plan for LLE bypass. Bypass cancelled (3/15) due to dyspnea while supine 2/2 pulmonary effusions. Pulmonary, Nephrology, Medicine, & Cardiology are consulted for optimization of patient. Undergoing aggressive diuresis & fluid management.     -Plan for bypass next week; will f/u w/ definitive date/time.     #Medically optimized: "pt now medically optimized for procedure." (3/22/18 Progress Note)  #Cardiac optimized: "1. Appears euvolemic; 2. No cardiac contraindications to surgery." (3/22/18 Progress Note)    - please document Medicine, Pulmonary & Cardiac risk stratification for planned bypass procedure  - Strict I&O  - Wound care as per podiatry  - please f/u on Anesthesia recommendations (IC, repeat CXR/labs) (3/22/18 note)      KAREN Reynolds MD (PGY2)    (Please page C team [phone] for questions/concerns.) 63M PMHx DM, s/p R BKA, now presenting with Diabetic foot soft tissue infection, w/ angiography showing arterial stenosis of LLE, plan for LLE bypass. Bypass cancelled (3/15) due to dyspnea while supine 2/2 pulmonary effusions. Pulmonary, Nephrology, Medicine, & Cardiology are consulted for optimization of patient. Undergoing aggressive diuresis & fluid management.     -Plan for bypass next week; will f/u w/ definitive date/time, tentatively Tuesday 3/27/18    #Medically optimized: "pt now medically optimized for procedure." (3/22/18 Progress Note)  #Cardiac optimized: "1. Appears euvolemic; 2. No cardiac contraindications to surgery." (3/22/18 Progress Note)    - please document Medicine, Pulmonary & Cardiac risk stratification for planned bypass procedure  - Strict I&O  - Wound care as per podiatry  - please f/u on Anesthesia recommendations (IC, repeat CXR/labs) (3/22/18 note)      KAREN Reynolds MD (PGY2)    (Please page C team [phone] for questions/concerns.)

## 2018-03-22 NOTE — PROGRESS NOTE ADULT - SUBJECTIVE AND OBJECTIVE BOX
Vascular Surgery Progress Note  C Team [id number]       SUBJECTIVE/interval events:  -denies SOB on RA  -no complaints  -Medically optimization for bypass  -Cardiology cleared (3/22)  -Anesthesia evaluated & provided recommendations      OBJECTIVE:     ** VITAL SIGNS / I&O's **    Vital Signs Last 24 Hrs  T(C): 36.9 (22 Mar 2018 13:14), Max: 37.3 (22 Mar 2018 05:00)  T(F): 98.4 (22 Mar 2018 13:14), Max: 99.2 (22 Mar 2018 05:00)  HR: 64 (22 Mar 2018 13:30) (62 - 64)  BP: 157/63 (22 Mar 2018 13:30) (140/63 - 157/63)  BP(mean): --  RR: 18 (22 Mar 2018 13:14) (17 - 18)  SpO2: 97% (22 Mar 2018 13:14) (92% - 97%)      I&O's Summary    21 Mar 2018 07:01  -  22 Mar 2018 07:00  --------------------------------------------------------  IN: 940 mL / OUT: 1660 mL / NET: -720 mL        ** PHYSICAL EXAM **    Gen: Alert, NAD, off supplemental oxygen  Pulm: non-labored breathing, airway patent  Ext: LLE w/ kerlex dressing in place; s/p BKA        ** MEDICATIONS **    MEDICATIONS  (STANDING):  aspirin enteric coated 81 milliGRAM(s) Oral daily  atorvastatin 80 milliGRAM(s) Oral at bedtime  cyanocobalamin 1000 MICROGram(s) Oral daily  dextrose 5%. 1000 milliLiter(s) (50 mL/Hr) IV Continuous <Continuous>  dextrose 50% Injectable 25 Gram(s) IV Push once  dextrose 50% Injectable 25 Gram(s) IV Push once  furosemide    Tablet 40 milliGRAM(s) Oral daily  heparin  Injectable 5000 Unit(s) SubCutaneous every 8 hours  influenza   Vaccine 0.5 milliLiter(s) IntraMuscular once  insulin lispro (HumaLOG) corrective regimen sliding scale   SubCutaneous three times a day before meals  labetalol 200 milliGRAM(s) Oral three times a day  multivitamin 1 Tablet(s) Oral daily  NIFEdipine XL 90 milliGRAM(s) Oral daily  silver sulfADIAZINE 1% Cream 1 Application(s) Topical daily    MEDICATIONS  (PRN):  acetaminophen   Tablet. 650 milliGRAM(s) Oral every 6 hours PRN Moderate Pain (4 - 6)  benzocaine 15 mG/menthol 3.6 mG Lozenge 1 Lozenge Oral every 2 hours PRN Sore Throat  dextrose Gel 1 Dose(s) Oral once PRN Blood Glucose LESS THAN 70 milliGRAM(s)/deciliter  glucagon  Injectable 1 milliGRAM(s) IntraMuscular once PRN Glucose LESS THAN 70 milligrams/deciliter        ** LABS **                          11.4   5.48  )-----------( 281      ( 22 Mar 2018 06:50 )             35.4                         12.6   5.20  )-----------( 291      ( 21 Mar 2018 06:45 )             38.4                         10.9   4.66  )-----------( 231      ( 19 Mar 2018 06:21 )             35.3     03-22    142  |  97<L>  |  74<H>  ----------------------------<  92  4.6   |  30  |  4.66<H>    Ca    8.9      22 Mar 2018 06:50  Phos  6.0     03-22  Mg     1.9     03-22      19 Mar 2018 06:21    142    |  101    |  53     ----------------------------<  65     5.3     |  24     |  4.17     Ca    8.9        19 Mar 2018 06:21  Phos  4.8       19 Mar 2018 06:21  Mg     2.1       19 Mar 2018 06:21    Serum Pro-Brain Natriuretic Peptide (03.17.18 @ 06:45)    Serum Pro-Brain Natriuretic Peptide: 8301: ACUTE CONGESTIVE HEART FAILURE is UNLIKELY if NT-proBNP is < 300 pg/mL.        CAPILLARY BLOOD GLUCOSE      POCT Blood Glucose.: 89 mg/dL (19 Mar 2018 08:15)  POCT Blood Glucose.: 104 mg/dL (18 Mar 2018 22:17)  POCT Blood Glucose.: 128 mg/dL (18 Mar 2018 17:26)  POCT Blood Glucose.: 106 mg/dL (18 Mar 2018 12:22)          ** IMAGING **    Xray Chest 2 Views PA/Lat (03.19.18 @ 18:02) >  IMPRESSION:  Moderate right and small left pleural effusions with likely   associated passive atelectasis. Underlying atelectasis of other cause   and/or pneumonia in the appropriate clinical context is not excluded. No   pneumonia was reported on the CT scan, however.      CT Chest No Cont (03.18.18 @ 09:50) >  LUNGS, PLEURA AND LARGE AIRWAYS: Large bilateral pleural effusions   resulting in near complete compressive atelectasis involving the right   middle and both lower lobes. Patent central airways.   VESSELS: Atherosclerotic changes of the aorta and coronary arteries.  HEART: Heart size is enlarged.No pericardial effusion. Mitral annular and   aortic valvular calcifications. MEDIASTINUM AND ROBERT: Calcified left   hilar lymph node is noted.  CHEST WALL AND LOWER NECK: Within normal limits.  VISUALIZED UPPER ABDOMEN: Within normal limits.  BONES: Within normal limits.    IMPRESSION:     Large bilateral pleural effusions resulting compressive atelectasis as   described above.      Transthoracic Echocardiogram (03.02.18 @ 11:03) >  CONCLUSIONS:  1. Mitral annular calcification, otherwise normal mitral  valve. Mild mitral regurgitation.  2. Aortic valve leaflet morphology not well visualized.  Peak transaortic valve gradient equals 19 mm Hg, mean  transaortic valve gradient equals 11 mm Hg, consistent with  probable mild aortic stenosis. Minimal aortic  regurgitation.  3. Normal left ventricular internal dimensions and wall  thicknesses.  4. Endocardium not well visualized; grossly normal left  ventricular systolic function.  5. The right ventricle is not well visualized; grossly  normal right ventricular systolic function.  6. Estimated right ventricular systolic pressure equals 57  mm Hg, assuming right atrial pressure equals 10 mm Hg,  consistent with moderate pulmonary hypertension.  7. Bilateral pleural effusions.        VA Duplex Ext Veins Lower Comp, Bilat. (03.09.18 @ 16:18) >  Summary/Impressions:  No evidence of thrombosis or significant venous wall  thickening noted in the visualized bilateral great  saphenous veins.  Vesseldiameters as noted above.       VA Duplex Ext Veins Lower Comp, Bilat. (03.09.18 @ 10:47) >  Summary/Impressions:  1.  No evidence of deep vein thrombosis in thevisualized  right and left lower extremities.  2.  No evidence of deep and superficial venous  insufficiency noted in the visualized right and left lower  extremities.        VA Duplex Physiol Ext Low 3+ Level, BI. (02.13.18 @ 14:12) >  Right Findings: History of right lower extremity  amputation.  Left Findings: The ankle-brachial index (KENN) on the left  is mildly decreased (0.71).  The toe-brachial index (TBI) on the left was not assessed  (overlying open wounds and  dressings).  Pulse volume recording (PVR) waveforms appear triphasic  with normal amplitudes at the left thigh, calf and ankle.  Waveforms are reduced in amplitude at the metatarsal and  digit segments.  These findings suggest the presence of  small vessel arterial disease.  ------------------------------------------------------------------------  Summary/Impressions:  Left lower extremity KENN is mildly decreased (0.71).  Left  TBI was not assessed.  Waveform analysis suggest the  presence of small vessel arterial disease.

## 2018-03-22 NOTE — PROGRESS NOTE ADULT - SUBJECTIVE AND OBJECTIVE BOX
Date of Admission:    24H hour events:     MEDICATIONS:  aspirin enteric coated 81 milliGRAM(s) Oral daily  furosemide    Tablet 40 milliGRAM(s) Oral daily  heparin  Injectable 5000 Unit(s) SubCutaneous every 8 hours  labetalol 200 milliGRAM(s) Oral three times a day  NIFEdipine XL 90 milliGRAM(s) Oral daily        acetaminophen   Tablet. 650 milliGRAM(s) Oral every 6 hours PRN      atorvastatin 80 milliGRAM(s) Oral at bedtime  dextrose 50% Injectable 25 Gram(s) IV Push once  dextrose 50% Injectable 25 Gram(s) IV Push once  dextrose Gel 1 Dose(s) Oral once PRN  glucagon  Injectable 1 milliGRAM(s) IntraMuscular once PRN  insulin lispro (HumaLOG) corrective regimen sliding scale   SubCutaneous three times a day before meals    benzocaine 15 mG/menthol 3.6 mG Lozenge 1 Lozenge Oral every 2 hours PRN  cyanocobalamin 1000 MICROGram(s) Oral daily  dextrose 5%. 1000 milliLiter(s) IV Continuous <Continuous>  influenza   Vaccine 0.5 milliLiter(s) IntraMuscular once  multivitamin 1 Tablet(s) Oral daily  silver sulfADIAZINE 1% Cream 1 Application(s) Topical daily      REVIEW OF SYSTEMS:  Complete 10point ROS negative.    PHYSICAL EXAM:  T(C): 37.3 (03-22-18 @ 05:00), Max: 37.3 (03-22-18 @ 05:00)  HR: 64 (03-22-18 @ 05:00) (64 - 70)  BP: 140/65 (03-22-18 @ 05:00) (140/63 - 158/72)  RR: 17 (03-22-18 @ 05:00) (17 - 18)  SpO2: 96% (03-22-18 @ 05:00) (92% - 96%)  Wt(kg): --  I&O's Summary    21 Mar 2018 07:01  -  22 Mar 2018 07:00  --------------------------------------------------------  IN: 940 mL / OUT: 1660 mL / NET: -720 mL        Appearance: Normal	  HEENT:   Normal oral mucosa, PERRL, EOMI	  Lymphatic: No lymphadenopathy  Cardiovascular: Normal S1 S2, No JVD, No murmurs, No edema  Respiratory: Lungs clear to auscultation	  Psychiatry: A & O x 3, Mood & affect appropriate  Gastrointestinal:  Soft, Non-tender, + BS	  Skin: No rashes, No ecchymoses, No cyanosis	  Neurologic: Non-focal  Extremities: Normal range of motion, No clubbing, cyanosis or edema  Vascular: Peripheral pulses palpable 2+ bilaterally        LABS:	 	    CBC Full  -  ( 22 Mar 2018 06:50 )  WBC Count : 5.48 K/uL  Hemoglobin : 11.4 g/dL  Hematocrit : 35.4 %  Platelet Count - Automated : 281 K/uL  Mean Cell Volume : 84.5 fL  Mean Cell Hemoglobin : 27.2 pg  Mean Cell Hemoglobin Concentration : 32.2 %  Auto Neutrophil # : x  Auto Lymphocyte # : x  Auto Monocyte # : x  Auto Eosinophil # : x  Auto Basophil # : x  Auto Neutrophil % : x  Auto Lymphocyte % : x  Auto Monocyte % : x  Auto Eosinophil % : x  Auto Basophil % : x    03-22    142  |  97<L>  |  74<H>  ----------------------------<  92  4.6   |  30  |  4.66<H>  03-21    143  |  98  |  61<H>  ----------------------------<  117<H>  4.2   |  30  |  4.45<H>    Ca    8.9      22 Mar 2018 06:50  Ca    8.9      21 Mar 2018 06:45  Phos  6.0     03-22  Phos  5.5     03-21  Mg     1.9     03-22  Mg     1.8     03-21        	  ASSESSMENT/PLAN: 	    1. Appears euvolemic  2. No cardiac contraindications to surgery.      Marlon Corbett MD, FACC  [id number]

## 2018-03-22 NOTE — PROGRESS NOTE ADULT - PROBLEM SELECTOR PLAN 3
Resolved; Likely due to uremic acidosis with accumulate of organic products  - Pt's serum bicarb 30 on last BMP, will d/c supplemental Bicarb and monitor

## 2018-03-22 NOTE — PROGRESS NOTE ADULT - PROBLEM SELECTOR PLAN 5
-s/p angiogram with multi vessel disease s/p LE dopplers & vein mapping for Femoral-popliteal bypass. KENN .71 on left.   -c/w ASA and statin; tentative plan for fem-pop bypass next week if cleared by Anesthesia

## 2018-03-22 NOTE — PROGRESS NOTE ADULT - PROBLEM SELECTOR PLAN 1
-Pt had pulmonary edema and significant b/l pleural effusions IV lasix; now greatly improved s/p bumex gtt x3 days   - re-started PO Lasix in absence of IV diuetic, will continue to monitor BMP as labs beginning to show evidence on contraction prior to discontinuation of IV diuretics   - Monitor I's and O's & daily weight

## 2018-03-22 NOTE — PROGRESS NOTE ADULT - SUBJECTIVE AND OBJECTIVE BOX
[facility] DIVISION OF KIDNEY DISEASES AND HYPERTENSION -- FOLLOW UP NOTE  --------------------------------------------------------------------------------    HPI: 63-year-old male with PMH of HTN, DM, right BKA, and CKD admitted for left foot infection. As per review of labs on [place]/[employer], Scr was 1.51 in 3/2017. As per PMD's office, Scr checked in 7/2017 was 2.30. Labs on admission (2/12) showed elevated Scr of 3.8 which peaked to 6.3 on 2/23 and is stable at 4.44 today. Pt. underwent vascular study/left leg angiogram on 3/8. Patient seen and examined today. Pt experienced SOB which has now resolved with initiation of IV diuretic therapy. Pt switched to oral diuretic therapy 3/22. Comfortable off nasal cannula at bedside. Pt. denies SOB, CP or N/V.     PAST HISTORY  --------------------------------------------------------------------------------  No significant changes to PMH, PSH, FHx, SHx, unless otherwise noted    ALLERGIES & MEDICATIONS  --------------------------------------------------------------------------------  Allergies    No Known Allergies    Intolerances      Standing Inpatient Medications  aspirin enteric coated 81 milliGRAM(s) Oral daily  atorvastatin 80 milliGRAM(s) Oral at bedtime  cyanocobalamin 1000 MICROGram(s) Oral daily  dextrose 5%. 1000 milliLiter(s) IV Continuous <Continuous>  dextrose 50% Injectable 25 Gram(s) IV Push once  dextrose 50% Injectable 25 Gram(s) IV Push once  furosemide    Tablet 40 milliGRAM(s) Oral daily  heparin  Injectable 5000 Unit(s) SubCutaneous every 8 hours  influenza   Vaccine 0.5 milliLiter(s) IntraMuscular once  insulin lispro (HumaLOG) corrective regimen sliding scale   SubCutaneous three times a day before meals  labetalol 200 milliGRAM(s) Oral three times a day  multivitamin 1 Tablet(s) Oral daily  NIFEdipine XL 90 milliGRAM(s) Oral daily  silver sulfADIAZINE 1% Cream 1 Application(s) Topical daily    PRN Inpatient Medications  acetaminophen   Tablet. 650 milliGRAM(s) Oral every 6 hours PRN  benzocaine 15 mG/menthol 3.6 mG Lozenge 1 Lozenge Oral every 2 hours PRN  dextrose Gel 1 Dose(s) Oral once PRN  glucagon  Injectable 1 milliGRAM(s) IntraMuscular once PRN      REVIEW OF SYSTEMS  --------------------------------------------------------------------------------  Gen: +weakness  Skin: No rashes  Head/Eyes/Ears/Mouth: No headache  Respiratory: No dyspnea  CV: No chest pain, PND, orthopnea  GI: No abdominal pain, diarrhea  : No increased frequency  MSK: No edema  Neuro: No dizziness/lightheadedness  Heme: No bleeding    All other systems were reviewed and are negative, except as noted.    VITALS/PHYSICAL EXAM  --------------------------------------------------------------------------------  T(C): 36.9 (03-22-18 @ 11:52), Max: 37.3 (03-22-18 @ 05:00)  HR: 64 (03-22-18 @ 13:30) (62 - 64)  BP: 157/63 (03-22-18 @ 13:30) (140/63 - 157/63)  RR: 18 (03-22-18 @ 11:52) (17 - 18)  SpO2: 96% (03-22-18 @ 11:52) (92% - 96%)  Wt(kg): --        03-21-18 @ 07:01  -  03-22-18 @ 07:00  --------------------------------------------------------  IN: 940 mL / OUT: 1660 mL / NET: -720 mL      Physical Exam:  	Gen: NAD, well-appearing  	Pulm: CTA B/L  	CV: normal S1S2; no rub  	Abd: soft  	: No cervantes  	LE: no edema + R BKA  	Skin: Warm, without rashes    LABS/STUDIES  --------------------------------------------------------------------------------              11.4   5.48  >-----------<  281      [03-22-18 @ 06:50]              35.4     142  |  97  |  74  ----------------------------<  92      [03-22-18 @ 06:50]  4.6   |  30  |  4.66        Ca     8.9     [03-22-18 @ 06:50]      Mg     1.9     [03-22-18 @ 06:50]      Phos  6.0     [03-22-18 @ 06:50]    Creatinine Trend:  SCr 4.66 [03-22 @ 06:50]  SCr 4.45 [03-21 @ 06:45]  SCr 4.29 [03-20 @ 18:40]  SCr 4.34 [03-20 @ 06:25]  SCr 4.22 [03-19 @ 17:35]    Urinalysis - [02-25-18 @ 04:50]      Color PLYEL / Appearance CLEAR / SG 1.015 / pH 5.5      Gluc NEGATIVE / Ketone NEGATIVE  / Bili NEGATIVE / Urobili NORMAL       Blood TRACE / Protein 100 / Leuk Est TRACE / Nitrite NEGATIVE      RBC 0-2 / WBC 2-5 / Hyaline  / Gran  / Sq Epi OCC / Non Sq Epi  / Bacteria       Iron 21, TIBC 163, %sat --      [02-20-18 @ 05:00]  Ferritin 540.7      [02-20-18 @ 05:00]  HbA1c 5.9      [02-13-18 @ 07:11]  TSH 3.22      [03-08-18 @ 06:00]      Free Light Chains: kappa 20.50, lambda 11.20, ratio = 1.83 H      [02-25 @ 07:43]

## 2018-03-22 NOTE — PROGRESS NOTE ADULT - PROBLEM SELECTOR PLAN 1
Pt. with JANY on CKD in the setting of infection and diarrhea. CKD in the setting of long-standing DM. Scr was 1.5 in 3/2017, increased to 2.30 in 7/2017. Scr on admission (2/12) was elevated at 3.81, increased to 6.3 on 2/23 and has now improved. Pt. also underwent vascular study/left leg angiogram on 3/8. Scr stable at 4.66 today. Pt. at increased risk for radiocontrast nephropathy. Pt. aware and understands increased risk of worsening renal function after contrast procedure, and is agreeable to undergo HD if needed after the procedure. Monitor BMP and urine output. Avoid any potential nephrotoxins. Pt. with JANY on CKD in the setting of infection and diarrhea. CKD in the setting of long-standing DM. Scr was 1.5 in 3/2017, increased to 2.30 in 7/2017. Scr on admission (2/12) was elevated at 3.81, increased to 6.3 on 2/23 and has now improved. Pt. also underwent vascular study/left leg angiogram on 3/8. Scr slightly increased to 4.66 today, likely hemodynamically mediated in the setting of lasix use. Pt. at increased risk for radiocontrast nephropathy. Pt. aware and understands increased risk of worsening renal function after contrast procedure, and is agreeable to undergo HD if needed after the procedure. Monitor BMP and urine output. Avoid any potential nephrotoxins.

## 2018-03-22 NOTE — PROGRESS NOTE ADULT - ASSESSMENT
63M h/o DM, HTN, CKD, R BKA (2009) a/w sepsis 2/2 cellulitis c/b ATN and found to have severe LLE SFA disease, now pending LLE fem-pop bypass. Course had been further complicated by acute hypoxic resp failure 2/2 fluid overload forcing cancellation of initially scheduled bypass, however pt now medically optimized for procedure.

## 2018-03-22 NOTE — PROGRESS NOTE ADULT - PROBLEM SELECTOR PLAN 7
- Pt on oral medications at home. Well controlled  - c/w ISS while inpatient, will need to adjust oral meds upon dc given new renal function

## 2018-03-22 NOTE — PROGRESS NOTE ADULT - PROBLEM SELECTOR PLAN 8
-DVT ppx: HSQ  -Diabetic Diet    Disposition: tentative plan for surgery next week pending anesthesia clearance. ultimate dispo to ECTOR Merchant  EM/IM/CC PGY5  Med Team 3  Pager #[phone]

## 2018-03-22 NOTE — PROGRESS NOTE ADULT - ATTENDING COMMENTS
Pt was seen and examined by me this morning.  Case d/w Dr. Merchant and I agree with findings and plan as detailed above.    No overnight events.  Denies any chest pain, SOB, or palpitations.  O/E - few bibasilar crackles, no LLE edema now.    This is a 63 M PMH of DM, HTN, CKD, Rt BKA in 2009 who p/w sepsis 2/2 cellulitis and LLE SFA disease, c/b ATN, planned for Lt fem-pop bypass but canceled due to acute hypoxic respiratory failure 2/2 fluid overload, likely 2/2 acute diastolic CHF.    # Hypoxic respiratory failure/Acute Diastolic HF  likely secondary to volume overload  -improving; remains normoxic on room air  -transitioned from bumex gtt to lasix po yesterday  -continue lasix 40mg daily  -fluid restrict to <1.5 L per day  -continue monitoring I/Os  -monitoring electrolytes    # JANY likely ATN on underlying CKD  -monitoring creat - stable at present; no indication for HD presently  -D/w renal    #PVD  -planned for Fem-pop bypass (possibly 3/26)  -d/w anesthesia regarding pre-procedure evaluation of periop status - currently pt euvolemic; add incentive spirometry and repeat CXR on 3/25  -no indication for pre-op HD but may be needed afterwards.  -will closely monitor I/O, monitor BMP

## 2018-03-22 NOTE — PROGRESS NOTE ADULT - PROBLEM SELECTOR PLAN 2
Pt with hypervolemia in the setting of CKD. Pt. responsive to IV diuretic therapy. Pt with with elevated serum bicarbonate and elevated Scr. Recommend to hold oral lasix today. Pt clinically stable and appear euvolemic. Will reassess volume status daily. Labs reviewed. No acute indication for renal replacement therapy at this time. Monitor weight daily. Pt with hypervolemia in the setting of CKD. Pt. responsive to IV diuretic therapy. Pt with with elevated serum bicarbonate and elevated Scr today. Pt clinically stable and appear euvolemic. Recommend to hold lasix . Will reassess volume status daily. Labs reviewed. No acute indication for renal replacement therapy at this time. Monitor weight daily.

## 2018-03-22 NOTE — PROGRESS NOTE ADULT - SUBJECTIVE AND OBJECTIVE BOX
Patient is 63 year old male admitted for left lower extremity celullitis dm, ckd, s/p r bka, found to have CRI S/P LEFT LOWER EXTREMITY ANGYOGRAM WITH contrast nephropathy which developed acute tubular necrosis and fluid overload s/p echo with tico lv function and some diastolic dysfunction  , high anion gap met acidosis scheduled to have left lower extremity revascularization s/p Blood Transfusion x2 which worsened fluid overload, bilateral pleural effusion on ASU day of surgery 3/15 patient vitals sign sat 94% breathing at 24. taken to the or, ASA monitor applied, o2 sat 89 90, o2 mask applied found to be breathing at rate of 29/34 bpm, with tidal volume of low 200 , reviewed of the CXR showed worsening of fluid overload, decreased bilateral breath sound occasional ronchus and increased auscultation of the voice called senior and John attending in charge of pacu decided to cancel the case to optimization,  Patient was seen by renal and started on Bumex drip and with stric IN/OUt patient became with negative balance and stable, called again to revaluate the patient for surgery on Monday,   Patient laying in bed , comfortable Vital sign RR18, HR 62 /58, sat 96 on room air, Auscultation showed very mild fines rales on bases compatible with atelectasis and mild pleural effusion, airway class II.  Suggest to add Incentive spirometer repeat Chest x Ray to document improving of pulmonary edema, repeat labs day of surgery Monday morning. I will Share patient information to attending anesthesiologist involve in the case on Monday.

## 2018-03-23 LAB
BUN SERPL-MCNC: 72 MG/DL — HIGH (ref 7–23)
CALCIUM SERPL-MCNC: 9.2 MG/DL — SIGNIFICANT CHANGE UP (ref 8.4–10.5)
CHLORIDE SERPL-SCNC: 100 MMOL/L — SIGNIFICANT CHANGE UP (ref 98–107)
CO2 SERPL-SCNC: 28 MMOL/L — SIGNIFICANT CHANGE UP (ref 22–31)
CREAT SERPL-MCNC: 4.41 MG/DL — HIGH (ref 0.5–1.3)
GLUCOSE BLDC GLUCOMTR-MCNC: 151 MG/DL — HIGH (ref 70–99)
GLUCOSE BLDC GLUCOMTR-MCNC: 202 MG/DL — HIGH (ref 70–99)
GLUCOSE BLDC GLUCOMTR-MCNC: 218 MG/DL — HIGH (ref 70–99)
GLUCOSE BLDC GLUCOMTR-MCNC: 81 MG/DL — SIGNIFICANT CHANGE UP (ref 70–99)
GLUCOSE SERPL-MCNC: 79 MG/DL — SIGNIFICANT CHANGE UP (ref 70–99)
HCT VFR BLD CALC: 34.7 % — LOW (ref 39–50)
HGB BLD-MCNC: 11.2 G/DL — LOW (ref 13–17)
MAGNESIUM SERPL-MCNC: 1.9 MG/DL — SIGNIFICANT CHANGE UP (ref 1.6–2.6)
MCHC RBC-ENTMCNC: 27.5 PG — SIGNIFICANT CHANGE UP (ref 27–34)
MCHC RBC-ENTMCNC: 32.3 % — SIGNIFICANT CHANGE UP (ref 32–36)
MCV RBC AUTO: 85 FL — SIGNIFICANT CHANGE UP (ref 80–100)
NRBC # FLD: 0 — SIGNIFICANT CHANGE UP
PHOSPHATE SERPL-MCNC: 5.2 MG/DL — HIGH (ref 2.5–4.5)
PLATELET # BLD AUTO: 287 K/UL — SIGNIFICANT CHANGE UP (ref 150–400)
PMV BLD: 10.8 FL — SIGNIFICANT CHANGE UP (ref 7–13)
POTASSIUM SERPL-MCNC: 4.6 MMOL/L — SIGNIFICANT CHANGE UP (ref 3.5–5.3)
POTASSIUM SERPL-SCNC: 4.6 MMOL/L — SIGNIFICANT CHANGE UP (ref 3.5–5.3)
RBC # BLD: 4.08 M/UL — LOW (ref 4.2–5.8)
RBC # FLD: 14.2 % — SIGNIFICANT CHANGE UP (ref 10.3–14.5)
SODIUM SERPL-SCNC: 143 MMOL/L — SIGNIFICANT CHANGE UP (ref 135–145)
WBC # BLD: 6.27 K/UL — SIGNIFICANT CHANGE UP (ref 3.8–10.5)
WBC # FLD AUTO: 6.27 K/UL — SIGNIFICANT CHANGE UP (ref 3.8–10.5)

## 2018-03-23 PROCEDURE — 99233 SBSQ HOSP IP/OBS HIGH 50: CPT | Mod: GC

## 2018-03-23 PROCEDURE — 99231 SBSQ HOSP IP/OBS SF/LOW 25: CPT

## 2018-03-23 RX ORDER — FUROSEMIDE 40 MG
40 TABLET ORAL
Refills: 0 | Status: DISCONTINUED | OUTPATIENT
Start: 2018-03-23 | End: 2018-03-23

## 2018-03-23 RX ORDER — FUROSEMIDE 40 MG
40 TABLET ORAL
Refills: 0 | Status: DISCONTINUED | OUTPATIENT
Start: 2018-03-23 | End: 2018-03-26

## 2018-03-23 RX ADMIN — PREGABALIN 1000 MICROGRAM(S): 225 CAPSULE ORAL at 11:05

## 2018-03-23 RX ADMIN — Medication 1: at 12:14

## 2018-03-23 RX ADMIN — Medication 200 MILLIGRAM(S): at 06:05

## 2018-03-23 RX ADMIN — Medication 40 MILLIGRAM(S): at 06:05

## 2018-03-23 RX ADMIN — HEPARIN SODIUM 5000 UNIT(S): 5000 INJECTION INTRAVENOUS; SUBCUTANEOUS at 16:05

## 2018-03-23 RX ADMIN — HEPARIN SODIUM 5000 UNIT(S): 5000 INJECTION INTRAVENOUS; SUBCUTANEOUS at 06:06

## 2018-03-23 RX ADMIN — Medication 1 APPLICATION(S): at 12:14

## 2018-03-23 RX ADMIN — Medication 200 MILLIGRAM(S): at 21:39

## 2018-03-23 RX ADMIN — HEPARIN SODIUM 5000 UNIT(S): 5000 INJECTION INTRAVENOUS; SUBCUTANEOUS at 21:39

## 2018-03-23 RX ADMIN — Medication 40 MILLIGRAM(S): at 17:48

## 2018-03-23 RX ADMIN — Medication 2: at 17:46

## 2018-03-23 RX ADMIN — Medication 1 TABLET(S): at 11:05

## 2018-03-23 RX ADMIN — Medication 200 MILLIGRAM(S): at 16:05

## 2018-03-23 RX ADMIN — Medication 90 MILLIGRAM(S): at 06:05

## 2018-03-23 RX ADMIN — Medication 81 MILLIGRAM(S): at 11:05

## 2018-03-23 RX ADMIN — ATORVASTATIN CALCIUM 80 MILLIGRAM(S): 80 TABLET, FILM COATED ORAL at 21:39

## 2018-03-23 NOTE — PROGRESS NOTE ADULT - ASSESSMENT
63M PMHx DM, s/p R BKA, now presenting with Diabetic foot soft tissue infection, w/ angiography showing arterial stenosis of LLE, plan for LLE bypass. Bypass cancelled (3/15) due to dyspnea while supine 2/2 pulmonary effusions. Pulmonary, Nephrology, Medicine, & Cardiology are consulted for optimization of patient. Undergoing aggressive diuresis & fluid management.     -Plan for bypass next week; tentatively Tuesday 3/27/18    #Medically optimized: "pt now medically optimized for procedure." (3/22/18 Progress Note)  #Cardiac optimized: "1. Appears euvolemic; 2. No cardiac contraindications to surgery." (3/22/18 Progress Note)    - please f/u on Anesthesia recommendations (Incentive spirometry & repeat CXR/labs) (3/22/18 Chart note)  - please document Medicine, Pulmonary & Cardiac risk stratification for planned bypass procedure  - Strict I&O  - Wound care as per podiatry      Pre-operatively please ensure:  -NPO@Mdn on Monday 3/26  -document medical and cardiac optimization, as well as risk stratification  -send Pre-op labs the evening (Monday) prior to procedure: CBC, BMP/Mg/Phos, PT/PTT/INR, T&S  -ensure 12-lead EKG in chart (w/in 48hr)  -CXR in PACS (w/in 48hr)  -adjust insulin regimen to reflect NPO status    Consent in Chart.       KAREN Reynolds MD (PGY2)    (Please page C team [phone] for questions/concerns.)

## 2018-03-23 NOTE — PROGRESS NOTE ADULT - ASSESSMENT
LEFT toes, forefoot, midfoot blister (improving).    Plan:  - Pt seen and evaluated  - Appearance of foot continues to improve  - Cont IV abx  - Silvadene to LEFT foot wound daily  - No pod sx intervention at this time  - [facility] planning bypass for Monday.  - Will cont to follow.

## 2018-03-23 NOTE — PROGRESS NOTE ADULT - SUBJECTIVE AND OBJECTIVE BOX
Vascular Surgery Progress Note  C Team [id number]       SUBJECTIVE/interval events:  -tentatively on OR schedule for Tuesday 3/27/18   -w/out complaints      OBJECTIVE:     ** VITAL SIGNS / I&O's **    Vital Signs Last 24 Hrs  T(C): 37 (23 Mar 2018 06:01), Max: 37.2 (22 Mar 2018 20:58)  T(F): 98.6 (23 Mar 2018 06:01), Max: 98.9 (22 Mar 2018 20:58)  HR: 63 (23 Mar 2018 06:01) (62 - 68)  BP: 154/73 (23 Mar 2018 06:01) (148/58 - 161/75)  BP(mean): --  RR: 18 (23 Mar 2018 06:01) (18 - 18)  SpO2: 98% (23 Mar 2018 06:01) (96% - 98%)      I&O's Summary    22 Mar 2018 07:01  -  23 Mar 2018 07:00  --------------------------------------------------------  IN: 480 mL / OUT: 1525 mL / NET: -1045 mL    23 Mar 2018 07:01  -  23 Mar 2018 09:40  --------------------------------------------------------  IN: 0 mL / OUT: 200 mL / NET: -200 mL          ** PHYSICAL EXAM **    Gen: Alert, NAD, off supplemental oxygen  Pulm: non-labored breathing, airway patent  Ext: LLE w/ kerlex dressing in place; s/p BKA        ** MEDICATIONS **    MEDICATIONS  (STANDING):  aspirin enteric coated 81 milliGRAM(s) Oral daily  atorvastatin 80 milliGRAM(s) Oral at bedtime  cyanocobalamin 1000 MICROGram(s) Oral daily  dextrose 5%. 1000 milliLiter(s) (50 mL/Hr) IV Continuous <Continuous>  dextrose 50% Injectable 25 Gram(s) IV Push once  dextrose 50% Injectable 25 Gram(s) IV Push once  furosemide    Tablet 40 milliGRAM(s) Oral daily  heparin  Injectable 5000 Unit(s) SubCutaneous every 8 hours  influenza   Vaccine 0.5 milliLiter(s) IntraMuscular once  insulin lispro (HumaLOG) corrective regimen sliding scale   SubCutaneous three times a day before meals  labetalol 200 milliGRAM(s) Oral three times a day  multivitamin 1 Tablet(s) Oral daily  NIFEdipine XL 90 milliGRAM(s) Oral daily  silver sulfADIAZINE 1% Cream 1 Application(s) Topical daily    MEDICATIONS  (PRN):  acetaminophen   Tablet. 650 milliGRAM(s) Oral every 6 hours PRN Moderate Pain (4 - 6)  benzocaine 15 mG/menthol 3.6 mG Lozenge 1 Lozenge Oral every 2 hours PRN Sore Throat  dextrose Gel 1 Dose(s) Oral once PRN Blood Glucose LESS THAN 70 milliGRAM(s)/deciliter  glucagon  Injectable 1 milliGRAM(s) IntraMuscular once PRN Glucose LESS THAN 70 milligrams/deciliter      ** LABS **                          11.2   6.27  )-----------( 287      ( 23 Mar 2018 06:20 )             34.7                         11.4   5.48  )-----------( 281      ( 22 Mar 2018 06:50 )             35.4                         12.6   5.20  )-----------( 291      ( 21 Mar 2018 06:45 )             38.4                         10.9   4.66  )-----------( 231      ( 19 Mar 2018 06:21 )             35.3     03-23    143  |  100  |  72<H>  ----------------------------<  79  4.6   |  28  |  4.41<H>    Ca    9.2      23 Mar 2018 06:20  Phos  5.2     03-23  Mg     1.9     03-23      03-22    142  |  97<L>  |  74<H>  ----------------------------<  92  4.6   |  30  |  4.66<H>    Ca    8.9      22 Mar 2018 06:50  Phos  6.0     03-22  Mg     1.9     03-22      19 Mar 2018 06:21    142    |  101    |  53     ----------------------------<  65     5.3     |  24     |  4.17     Ca    8.9        19 Mar 2018 06:21  Phos  4.8       19 Mar 2018 06:21  Mg     2.1       19 Mar 2018 06:21        Serum Pro-Brain Natriuretic Peptide (03.17.18 @ 06:45)    Serum Pro-Brain Natriuretic Peptide: 8301: ACUTE CONGESTIVE HEART FAILURE is UNLIKELY if NT-proBNP is < 300 pg/mL.    Hemoglobin A1C, Whole Blood (02.13.18 @ 07:11)    Hemoglobin A1C, Whole Blood: 5.9: High Risk (prediabetic)    5.7 - 6.4 %      CAPILLARY BLOOD GLUCOSE      POCT Blood Glucose.: 89 mg/dL (19 Mar 2018 08:15)  POCT Blood Glucose.: 104 mg/dL (18 Mar 2018 22:17)  POCT Blood Glucose.: 128 mg/dL (18 Mar 2018 17:26)  POCT Blood Glucose.: 106 mg/dL (18 Mar 2018 12:22)          ** IMAGING **    Xray Chest 2 Views PA/Lat (03.19.18 @ 18:02) >  IMPRESSION:  Moderate right and small left pleural effusions with likely   associated passive atelectasis. Underlying atelectasis of other cause   and/or pneumonia in the appropriate clinical context is not excluded. No   pneumonia was reported on the CT scan, however.      CT Chest No Cont (03.18.18 @ 09:50) >  LUNGS, PLEURA AND LARGE AIRWAYS: Large bilateral pleural effusions   resulting in near complete compressive atelectasis involving the right   middle and both lower lobes. Patent central airways.   VESSELS: Atherosclerotic changes of the aorta and coronary arteries.  HEART: Heart size is enlarged.No pericardial effusion. Mitral annular and   aortic valvular calcifications. MEDIASTINUM AND ROBERT: Calcified left   hilar lymph node is noted.  CHEST WALL AND LOWER NECK: Within normal limits.  VISUALIZED UPPER ABDOMEN: Within normal limits.  BONES: Within normal limits.    IMPRESSION:     Large bilateral pleural effusions resulting compressive atelectasis as   described above.      Transthoracic Echocardiogram (03.02.18 @ 11:03) >  CONCLUSIONS:  1. Mitral annular calcification, otherwise normal mitral  valve. Mild mitral regurgitation.  2. Aortic valve leaflet morphology not well visualized.  Peak transaortic valve gradient equals 19 mm Hg, mean  transaortic valve gradient equals 11 mm Hg, consistent with  probable mild aortic stenosis. Minimal aortic  regurgitation.  3. Normal left ventricular internal dimensions and wall  thicknesses.  4. Endocardium not well visualized; grossly normal left  ventricular systolic function.  5. The right ventricle is not well visualized; grossly  normal right ventricular systolic function.  6. Estimated right ventricular systolic pressure equals 57  mm Hg, assuming right atrial pressure equals 10 mm Hg,  consistent with moderate pulmonary hypertension.  7. Bilateral pleural effusions.        VA Duplex Ext Veins Lower Comp, Bilat. (03.09.18 @ 16:18) >  Summary/Impressions:  No evidence of thrombosis or significant venous wall  thickening noted in the visualized bilateral great  saphenous veins.  Vesseldiameters as noted above.       VA Duplex Ext Veins Lower Comp, Bilat. (03.09.18 @ 10:47) >  Summary/Impressions:  1.  No evidence of deep vein thrombosis in thevisualized  right and left lower extremities.  2.  No evidence of deep and superficial venous  insufficiency noted in the visualized right and left lower  extremities.        VA Duplex Physiol Ext Low 3+ Level, BI. (02.13.18 @ 14:12) >  Right Findings: History of right lower extremity  amputation.  Left Findings: The ankle-brachial index (KENN) on the left  is mildly decreased (0.71).  The toe-brachial index (TBI) on the left was not assessed  (overlying open wounds and  dressings).  Pulse volume recording (PVR) waveforms appear triphasic  with normal amplitudes at the left thigh, calf and ankle.  Waveforms are reduced in amplitude at the metatarsal and  digit segments.  These findings suggest the presence of  small vessel arterial disease.  ------------------------------------------------------------------------  Summary/Impressions:  Left lower extremity KENN is mildly decreased (0.71).  Left  TBI was not assessed.  Waveform analysis suggest the  presence of small vessel arterial disease.

## 2018-03-23 NOTE — PROGRESS NOTE ADULT - PROBLEM SELECTOR PLAN 8
RSCI 3; patient is high risk for a moderate risk procedure    Medically optimized for procedure.    Dalton Nunez D.O.  PGY-1 EM/IM  Pager #[phone] RSCI 3; patient is high risk for a moderate risk procedure    At present time and current condition, medically optimized for procedure.    Dalton Nunez D.O.  PGY-1 EM/IM  Pager #[phone]

## 2018-03-23 NOTE — PROGRESS NOTE ADULT - SUBJECTIVE AND OBJECTIVE BOX
[facility] DIVISION OF KIDNEY DISEASES AND HYPERTENSION -- FOLLOW UP NOTE  --------------------------------------------------------------------------------  HPI: 63-year-old male with PMH of HTN, DM, right BKA, and CKD admitted for left foot infection. As per review of labs on [place]/[employer], Scr was 1.51 in 3/2017. As per PMD's office, Scr checked in 7/2017 was 2.30. Labs on admission (2/12) showed elevated Scr of 3.8 which peaked to 6.3 on 2/23 and is stable at 4.41 today. Pt. underwent vascular study/left leg angiogram on 3/8. Patient seen and examined today. Pt experienced SOB which has now resolved with initiation of IV diuretic therapy. Pt switched to oral diuretic therapy 3/22. Comfortable off nasal cannula at bedside. Pt. denies SOB, CP or N/V.       PAST HISTORY  --------------------------------------------------------------------------------  No significant changes to PMH, PSH, FHx, SHx, unless otherwise noted    ALLERGIES & MEDICATIONS  --------------------------------------------------------------------------------  Allergies    No Known Allergies    Intolerances      Standing Inpatient Medications  aspirin enteric coated 81 milliGRAM(s) Oral daily  atorvastatin 80 milliGRAM(s) Oral at bedtime  cyanocobalamin 1000 MICROGram(s) Oral daily  dextrose 5%. 1000 milliLiter(s) IV Continuous <Continuous>  dextrose 50% Injectable 25 Gram(s) IV Push once  dextrose 50% Injectable 25 Gram(s) IV Push once  furosemide    Tablet 40 milliGRAM(s) Oral daily  heparin  Injectable 5000 Unit(s) SubCutaneous every 8 hours  influenza   Vaccine 0.5 milliLiter(s) IntraMuscular once  insulin lispro (HumaLOG) corrective regimen sliding scale   SubCutaneous three times a day before meals  labetalol 200 milliGRAM(s) Oral three times a day  multivitamin 1 Tablet(s) Oral daily  NIFEdipine XL 90 milliGRAM(s) Oral daily  silver sulfADIAZINE 1% Cream 1 Application(s) Topical daily        REVIEW OF SYSTEMS  --------------------------------------------------------------------------------  Gen: +weakness  Skin: No rashes  Head/Eyes/Ears/Mouth: No headache  Respiratory: No dyspnea  CV: No chest pain, PND, orthopnea  GI: No abdominal pain, diarrhea  : No increased frequency  MSK: No edema  Neuro: No dizziness/lightheadedness  Heme: No bleeding    All other systems were reviewed and are negative, except as noted.    VITALS/PHYSICAL EXAM  --------------------------------------------------------------------------------  T(C): 37 (03-23-18 @ 06:01), Max: 37.2 (03-22-18 @ 20:58)  HR: 63 (03-23-18 @ 06:01) (62 - 68)  BP: 154/73 (03-23-18 @ 06:01) (148/58 - 161/75)  RR: 18 (03-23-18 @ 06:01) (18 - 18)  SpO2: 98% (03-23-18 @ 06:01) (96% - 98%)  Wt(kg): --        03-22-18 @ 07:01  -  03-23-18 @ 07:00  --------------------------------------------------------  IN: 480 mL / OUT: 1525 mL / NET: -1045 mL      Physical Exam:  	Gen: NAD, well-appearing  	Pulm: CTA B/L  	CV: normal S1S2; no rub  	Abd: soft  	: No cervantes  	LE: no edema + R BKA  	Skin: Warm, without rashes    LABS/STUDIES  --------------------------------------------------------------------------------              11.2   6.27  >-----------<  287      [03-23-18 @ 06:20]              34.7     143  |  100  |  72  ----------------------------<  79      [03-23-18 @ 06:20]  4.6   |  28  |  4.41        Ca     9.2     [03-23-18 @ 06:20]      Mg     1.9     [03-23-18 @ 06:20]      Phos  5.2     [03-23-18 @ 06:20]            Creatinine Trend:  SCr 4.41 [03-23 @ 06:20]  SCr 4.66 [03-22 @ 06:50]  SCr 4.45 [03-21 @ 06:45]  SCr 4.29 [03-20 @ 18:40]  SCr 4.34 [03-20 @ 06:25]

## 2018-03-23 NOTE — PROGRESS NOTE ADULT - PROBLEM SELECTOR PLAN 1
-Pt had pulmonary edema and significant b/l pleural effusions IV lasix; now greatly improved s/p bumex gtt x3 days   - c/w PO Lasix; Cr stable; patient maintaining euvolemic status   - Monitor I's and O's & daily weight -Pt had pulmonary edema and significant b/l pleural effusions IV lasix; now greatly improved s/p bumex gtt x3 days   - Increase PO Lasix to BID; Cr stable; patient maintaining euvolemic status   - Monitor I's and O's & daily weight

## 2018-03-23 NOTE — PROGRESS NOTE ADULT - PROBLEM SELECTOR PLAN 7
-DVT ppx: HSQ  -Diabetic Diet    Disposition: tentative plan for surgery next week pending anesthesia clearance. ultimate dispo to [facility]

## 2018-03-23 NOTE — PROGRESS NOTE ADULT - ATTENDING COMMENTS
Pt was seen and examined by me this morning.  Case d/w Dr. Merchant and I agree with findings and plan as detailed above.    No overnight events.  Denies any chest pain, SOB, or palpitations.  O/E - few bibasilar crackles, no LLE edema now.    This is a 63 M PMH of DM, HTN, CKD, Rt BKA in 2009 who p/w sepsis 2/2 cellulitis and LLE SFA disease, c/b ATN, planned for Lt fem-pop bypass but canceled due to acute hypoxic respiratory failure 2/2 fluid overload, likely 2/2 acute diastolic CHF.    # Resolved Hypoxic respiratory failure/Acute Diastolic HF  likely secondary to volume overload  -hypoxia resolved - remains normoxic on room air  -continue lasix 40mg BID  -fluid restrict to <1.5 L per day  -continue monitoring I/Os  -monitoring electrolytes  -incentive spirometry    # JANY likely ATN on underlying CKD  -monitoring creat - stable at present; no indication for HD presently  -D/w renal    #PVD  -planned for Fem-pop bypass (3/27)  -d/w anesthesia regarding pre-procedure evaluation of periop status - currently pt euvolemic; add incentive spirometry  -no indication for pre-op HD but may be needed afterwards.  -will closely monitor I/O, monitor BMP

## 2018-03-23 NOTE — PROGRESS NOTE ADULT - PROBLEM SELECTOR PLAN 1
Pt. with JANY on CKD in the setting of infection and diarrhea. CKD in the setting of long-standing DM. Scr was 1.5 in 3/2017, increased to 2.30 in 7/2017. Scr on admission (2/12) was elevated at 3.81, peaked to 6.3 on 2/23 and remains stable at 4.41 today. Pt. also underwent vascular study/left leg angiogram on 3/8. Pt. at increased risk for radiocontrast nephropathy. Pt. aware and understands increased risk of worsening renal function after contrast procedure, and is agreeable to undergo HD if needed after the procedure. Monitor BMP and urine output. Avoid any potential nephrotoxins

## 2018-03-23 NOTE — PROGRESS NOTE ADULT - PROBLEM SELECTOR PLAN 4
-s/p angiogram with multi vessel disease s/p LE dopplers & vein mapping for Femoral-popliteal bypass. KENN .71 on left.   -c/w ASA and statin; tentative plan for fem-pop bypass next week if cleared by Anesthesia -s/p angiogram with multi vessel disease s/p LE dopplers & vein mapping for Femoral-popliteal bypass. KENN .71 on left.   -c/w ASA and statin; Plan for OR on 03/27 for Fem-Pop Bypass.

## 2018-03-23 NOTE — PROGRESS NOTE ADULT - SUBJECTIVE AND OBJECTIVE BOX
Patient is a 63y old  Male who presents with a chief complaint of 63M PMH DM, HTN, CKD p/w L foot pain x 3 days. (19 Feb 2018 10:39)       INTERVAL HPI/OVERNIGHT EVENTS:  Patient seen and evaluated at bedside.  Pt is resting comfortable in NAD. Denies N/V/F/C.  Pain rated at X/10    Allergies    No Known Allergies    Intolerances        Vital Signs Last 24 Hrs  T(C): 37 (23 Mar 2018 06:01), Max: 37.2 (22 Mar 2018 20:58)  T(F): 98.6 (23 Mar 2018 06:01), Max: 98.9 (22 Mar 2018 20:58)  HR: 63 (23 Mar 2018 06:01) (62 - 68)  BP: 154/73 (23 Mar 2018 06:01) (148/58 - 161/75)  BP(mean): --  RR: 18 (23 Mar 2018 06:01) (18 - 18)  SpO2: 98% (23 Mar 2018 06:01) (96% - 98%)    LABS:                        11.2   6.27  )-----------( 287      ( 23 Mar 2018 06:20 )             34.7     03-23    143  |  100  |  72<H>  ----------------------------<  79  4.6   |  28  |  4.41<H>    Ca    9.2      23 Mar 2018 06:20  Phos  5.2     03-23  Mg     1.9     03-23          CAPILLARY BLOOD GLUCOSE      POCT Blood Glucose.: 81 mg/dL (23 Mar 2018 08:07)  POCT Blood Glucose.: 90 mg/dL (22 Mar 2018 22:34)  POCT Blood Glucose.: 80 mg/dL (22 Mar 2018 17:09)  POCT Blood Glucose.: 162 mg/dL (22 Mar 2018 11:55)      Lower Extremity Physical Exam:  Vasular: DP/PT 0/4, B/L, CFT <2 seconds B/L, Temperature gradient warm, B/L.   Neuro: Epicritic sensation absent to the level of toes, B/L.  Musculoskeletal/Ortho: RIGHT BKA.  Skin: Left foot deroofed blister with cherry red non-blanchable trophic changes to plantar aspect of toes 1-5, forefoot, midfoot, no ascending erythema or swelling, no malodor, no purulence, no deep probe, etiology unknown, ROM of toes intact, CFT to each toes normal, no sinus tract, no undermining.     RADIOLOGY & ADDITIONAL TESTS:

## 2018-03-23 NOTE — PROGRESS NOTE ADULT - PROBLEM SELECTOR PLAN 2
Pt with hypervolemia in the setting of CKD. Pt. transitioned to oral diuretic therapy. Pt. appears more euvolemic today. Monitor weight daily and low salt diet Pt with hypervolemia in the setting of CKD. Pt. appears clinically stable. Agree with current dose of diuretics. Monitor weight daily and low salt diet

## 2018-03-23 NOTE — PROGRESS NOTE ADULT - SUBJECTIVE AND OBJECTIVE BOX
Patient is a 63y old  Male who presents with a chief complaint of 63M PMH DM, HTN, CKD p/w L foot pain x 3 days. (19 Feb 2018 10:39)      SUBJECTIVE / OVERNIGHT EVENTS: No acute overnight. Saturating appropriately on RA; denies dyspnea at rest or chest pain. Output -1.5 L in last 24 hrs.     MEDICATIONS  (STANDING):  aspirin enteric coated 81 milliGRAM(s) Oral daily  atorvastatin 80 milliGRAM(s) Oral at bedtime  cyanocobalamin 1000 MICROGram(s) Oral daily  dextrose 5%. 1000 milliLiter(s) (50 mL/Hr) IV Continuous <Continuous>  dextrose 50% Injectable 25 Gram(s) IV Push once  dextrose 50% Injectable 25 Gram(s) IV Push once  furosemide    Tablet 40 milliGRAM(s) Oral daily  heparin  Injectable 5000 Unit(s) SubCutaneous every 8 hours  influenza   Vaccine 0.5 milliLiter(s) IntraMuscular once  insulin lispro (HumaLOG) corrective regimen sliding scale   SubCutaneous three times a day before meals  labetalol 200 milliGRAM(s) Oral three times a day  multivitamin 1 Tablet(s) Oral daily  NIFEdipine XL 90 milliGRAM(s) Oral daily  silver sulfADIAZINE 1% Cream 1 Application(s) Topical daily    MEDICATIONS  (PRN):  acetaminophen   Tablet. 650 milliGRAM(s) Oral every 6 hours PRN Moderate Pain (4 - 6)  benzocaine 15 mG/menthol 3.6 mG Lozenge 1 Lozenge Oral every 2 hours PRN Sore Throat  dextrose Gel 1 Dose(s) Oral once PRN Blood Glucose LESS THAN 70 milliGRAM(s)/deciliter  glucagon  Injectable 1 milliGRAM(s) IntraMuscular once PRN Glucose LESS THAN 70 milligrams/deciliter        CAPILLARY BLOOD GLUCOSE      POCT Blood Glucose.: 81 mg/dL (23 Mar 2018 08:07)  POCT Blood Glucose.: 90 mg/dL (22 Mar 2018 22:34)  POCT Blood Glucose.: 80 mg/dL (22 Mar 2018 17:09)  POCT Blood Glucose.: 162 mg/dL (22 Mar 2018 11:55)  POCT Blood Glucose.: 87 mg/dL (22 Mar 2018 08:35)    I&O's Summary    22 Mar 2018 07:01  -  23 Mar 2018 07:00  --------------------------------------------------------  IN: 480 mL / OUT: 1525 mL / NET: -1045 mL        PHYSICAL EXAM:  GENERAL: No acute distress   HEAD:  Atraumatic, Normocephalic  EYES: EOMI, PERRLA,   NECK: Supple, No JVD  CHEST/LUNG: Bibasilar rales otherwise cta b/l; No wheeze  HEART: Regular rate and rhythm; No murmurs, rubs, or gallops  ABDOMEN: Soft, Nontender, Nondistended; Bowel sounds present  EXTREMITIES:  2+ Peripheral Pulses, s/p R BKA. Left foot dressed; unable to palpate pulses; No edema   PSYCH: AAOx3  NEUROLOGY: non-focal  SKIN: No rashes or lesions    LABS:  (03-23 @ 06:20)                        11.2  6.27 )-----------( 287                 34.7    Neutrophils = -- (--%)  Lymphocytes = -- (--%)  Eosinophils = -- (--%)  Basophils = -- (--%)  Monocytes = -- (--%)  Bands = --%    WBC Trend: 6.27<--, 5.48<--, 5.20<--  Hb Trend: 11.2<--, 11.4<--, 12.6<--, 12.1<--, 10.9<--  Plt Trend: 287<--, 281<--, 291<--, 317<--, 231<--  03-23    143  |  100  |  72<H>  ----------------------------<  79  4.6   |  28  |  4.41<H>    Ca    9.2      23 Mar 2018 06:20  Phos  5.2     03-23  Mg     1.9     03-23      Creatinine Trend: 4.41<--, 4.66<--, 4.45<--, 4.29<--, 4.34<--, 4.22<--    CXR (03/22):     IMPRESSION:     Continued bibasilar and retrocardiac opacity with slight improvement on   the right. Findings may be attributable to bilateral pleural effusions   with passive atelectasis. Underlying atelectasis of other cause and/or   pneumonia is not excluded.          Consultant(s) Notes Reviewed:  Vascular Surgery     Care Discussed with Consultants/Other Providers: Vascular Surgery

## 2018-03-24 LAB
BUN SERPL-MCNC: 72 MG/DL — HIGH (ref 7–23)
CALCIUM SERPL-MCNC: 9.5 MG/DL — SIGNIFICANT CHANGE UP (ref 8.4–10.5)
CHLORIDE SERPL-SCNC: 98 MMOL/L — SIGNIFICANT CHANGE UP (ref 98–107)
CO2 SERPL-SCNC: 27 MMOL/L — SIGNIFICANT CHANGE UP (ref 22–31)
CREAT SERPL-MCNC: 4.19 MG/DL — HIGH (ref 0.5–1.3)
GLUCOSE BLDC GLUCOMTR-MCNC: 148 MG/DL — HIGH (ref 70–99)
GLUCOSE BLDC GLUCOMTR-MCNC: 166 MG/DL — HIGH (ref 70–99)
GLUCOSE BLDC GLUCOMTR-MCNC: 194 MG/DL — HIGH (ref 70–99)
GLUCOSE BLDC GLUCOMTR-MCNC: 87 MG/DL — SIGNIFICANT CHANGE UP (ref 70–99)
GLUCOSE SERPL-MCNC: 81 MG/DL — SIGNIFICANT CHANGE UP (ref 70–99)
HCT VFR BLD CALC: 36.1 % — LOW (ref 39–50)
HGB BLD-MCNC: 11.3 G/DL — LOW (ref 13–17)
MAGNESIUM SERPL-MCNC: 2 MG/DL — SIGNIFICANT CHANGE UP (ref 1.6–2.6)
MCHC RBC-ENTMCNC: 26.8 PG — LOW (ref 27–34)
MCHC RBC-ENTMCNC: 31.3 % — LOW (ref 32–36)
MCV RBC AUTO: 85.5 FL — SIGNIFICANT CHANGE UP (ref 80–100)
NRBC # FLD: 0 — SIGNIFICANT CHANGE UP
PHOSPHATE SERPL-MCNC: 4.9 MG/DL — HIGH (ref 2.5–4.5)
PLATELET # BLD AUTO: 270 K/UL — SIGNIFICANT CHANGE UP (ref 150–400)
PMV BLD: 11.3 FL — SIGNIFICANT CHANGE UP (ref 7–13)
POTASSIUM SERPL-MCNC: 4.4 MMOL/L — SIGNIFICANT CHANGE UP (ref 3.5–5.3)
POTASSIUM SERPL-SCNC: 4.4 MMOL/L — SIGNIFICANT CHANGE UP (ref 3.5–5.3)
RBC # BLD: 4.22 M/UL — SIGNIFICANT CHANGE UP (ref 4.2–5.8)
RBC # FLD: 14.2 % — SIGNIFICANT CHANGE UP (ref 10.3–14.5)
SODIUM SERPL-SCNC: 141 MMOL/L — SIGNIFICANT CHANGE UP (ref 135–145)
WBC # BLD: 6.4 K/UL — SIGNIFICANT CHANGE UP (ref 3.8–10.5)
WBC # FLD AUTO: 6.4 K/UL — SIGNIFICANT CHANGE UP (ref 3.8–10.5)

## 2018-03-24 PROCEDURE — 99233 SBSQ HOSP IP/OBS HIGH 50: CPT | Mod: GC

## 2018-03-24 PROCEDURE — 99233 SBSQ HOSP IP/OBS HIGH 50: CPT

## 2018-03-24 RX ADMIN — Medication 200 MILLIGRAM(S): at 22:52

## 2018-03-24 RX ADMIN — Medication 90 MILLIGRAM(S): at 05:27

## 2018-03-24 RX ADMIN — Medication 1 TABLET(S): at 12:10

## 2018-03-24 RX ADMIN — PREGABALIN 1000 MICROGRAM(S): 225 CAPSULE ORAL at 12:11

## 2018-03-24 RX ADMIN — HEPARIN SODIUM 5000 UNIT(S): 5000 INJECTION INTRAVENOUS; SUBCUTANEOUS at 15:36

## 2018-03-24 RX ADMIN — ATORVASTATIN CALCIUM 80 MILLIGRAM(S): 80 TABLET, FILM COATED ORAL at 22:52

## 2018-03-24 RX ADMIN — HEPARIN SODIUM 5000 UNIT(S): 5000 INJECTION INTRAVENOUS; SUBCUTANEOUS at 22:52

## 2018-03-24 RX ADMIN — Medication 1: at 16:36

## 2018-03-24 RX ADMIN — Medication 200 MILLIGRAM(S): at 15:36

## 2018-03-24 RX ADMIN — HEPARIN SODIUM 5000 UNIT(S): 5000 INJECTION INTRAVENOUS; SUBCUTANEOUS at 05:27

## 2018-03-24 RX ADMIN — Medication 1: at 12:09

## 2018-03-24 RX ADMIN — Medication 1 APPLICATION(S): at 12:10

## 2018-03-24 RX ADMIN — Medication 81 MILLIGRAM(S): at 12:10

## 2018-03-24 RX ADMIN — Medication 40 MILLIGRAM(S): at 17:45

## 2018-03-24 RX ADMIN — Medication 200 MILLIGRAM(S): at 05:27

## 2018-03-24 RX ADMIN — Medication 40 MILLIGRAM(S): at 05:27

## 2018-03-24 NOTE — PROGRESS NOTE ADULT - PROBLEM SELECTOR PLAN 1
Pt. with JANY on CKD in the setting of infection and diarrhea. CKD in the setting of long-standing DM. Scr was 1.5 in 3/2017, increased to 2.30 in 7/2017. Scr on admission (2/12) was elevated at 3.81, peaked to 6.3 on 2/23 and remains stable at 4.41 on 3/23. Pt. also underwent vascular study/left leg angiogram on 3/8. Pt. at increased risk for radiocontrast nephropathy. Pt. aware and understands increased risk of worsening renal function after contrast procedure, and is agreeable to undergo HD if needed after the procedure. Monitor BMP and urine output. Avoid any potential nephrotoxins Pt. with JANY on CKD in the setting of infection and diarrhea. CKD in the setting of long-standing DM. Scr was 1.5 in 3/2017, increased to 2.30 in 7/2017. Scr on admission (2/12) was elevated at 3.81, peaked to 6.3 on 2/23 and remains stable at 4.1 today. Pt. also underwent vascular study/left leg angiogram on 3/8. Pt. at increased risk for radiocontrast nephropathy. Pt. aware and understands increased risk of worsening renal function after contrast procedure, and is agreeable to undergo HD if needed after the procedure. Monitor BMP and urine output. Avoid any potential nephrotoxins Pt. with JANY on CKD in the setting of infection and diarrhea. CKD in the setting of long-standing DM. Scr was 1.5 in 3/2017, increased to 2.30 in 7/2017. Scr on admission (2/12) was elevated at 3.81, peaked to 6.3 on 2/23 and remains stable at 4.1 today. Pt. also underwent vascular study/left leg angiogram on 3/8.  Monitor BMP and urine output. Avoid any potential nephrotoxins

## 2018-03-24 NOTE — PROGRESS NOTE ADULT - ATTENDING COMMENTS
Pt was seen and examined by me this morning.  Case d/w Dr. Nunez and I agree with findings and plan as detailed above.    This is a 63 M PMH of DM, HTN, CKD, Rt BKA in 2009 who p/w sepsis 2/2 cellulitis and LLE SFA disease, c/b ATN, planned for Lt fem-pop bypass but canceled due to acute hypoxic respiratory failure 2/2 fluid overload, likely 2/2 acute diastolic CHF.    # Resolved Hypoxic respiratory failure/Acute Diastolic HF  likely secondary to volume overload  -remains normoxic on room air  -continue lasix 40mg BID  -fluid restrict to <1.5 L per day  -continue monitoring I/Os  -monitoring electrolytes  -incentive spirometry    # JANY likely ATN on underlying CKD  -monitoring creat - stable at present; no indication for HD presently  -D/w renal    #PVD  -planned for Fem-pop bypass (3/27)  -d/w anesthesia regarding pre-procedure evaluation of periop status - currently pt euvolemic; c/w incentive spirometry  -no indication for pre-op HD but may be needed afterwards.  -will closely monitor I/O, monitor BMP

## 2018-03-24 NOTE — PROVIDER CONTACT NOTE (OTHER) - ASSESSMENT
HR 60, /64, Temp 89. No acute distress noted. Pt resting comfortably. HR 60, /64, Temp 98. No acute distress noted. Pt resting comfortably.

## 2018-03-24 NOTE — PROGRESS NOTE ADULT - PROBLEM SELECTOR PLAN 3
BP stable, monitor BP on current antihypertensives.  Low salt diet BP stable, monitor BP on current antihypertensives. Low salt diet

## 2018-03-24 NOTE — PROGRESS NOTE ADULT - PROBLEM SELECTOR PLAN 2
Pt with hypervolemia in the setting of CKD. Pt. appears clinically stable. Agree with current dose of diuretics. Monitor weight daily and low salt diet Pt with hypervolemia in the setting of CKD. Pt. appears clinically stable. Agree with current dose of diuretics(lasix po  40mg BID). Monitor weight daily and low salt diet

## 2018-03-24 NOTE — PROGRESS NOTE ADULT - PROBLEM SELECTOR PLAN 6
- Pt on oral medications at home. Well controlled  - c/w ISS while inpatient, will need to adjust oral meds upon dc given new baseline renal function

## 2018-03-24 NOTE — PROGRESS NOTE ADULT - PROBLEM SELECTOR PLAN 1
-Pt had pulmonary edema and significant b/l pleural effusions IV lasix; now greatly improved s/p bumex gtt x3 days   - c/w Lasix to BID; Cr stable; patient maintaining euvolemic status   - Monitor I's and O's & daily weight

## 2018-03-24 NOTE — PROGRESS NOTE ADULT - SUBJECTIVE AND OBJECTIVE BOX
Patient is a 63y old  Male who presents with a chief complaint of 63M PMH DM, HTN, CKD p/w L foot pain x 3 days. (19 Feb 2018 10:39)      SUBJECTIVE / OVERNIGHT EVENTS: No acute overnight events. Saturating appropriately on RA. Output-1350cc.    MEDICATIONS  (STANDING):  aspirin enteric coated 81 milliGRAM(s) Oral daily  atorvastatin 80 milliGRAM(s) Oral at bedtime  cyanocobalamin 1000 MICROGram(s) Oral daily  dextrose 5%. 1000 milliLiter(s) (50 mL/Hr) IV Continuous <Continuous>  dextrose 50% Injectable 25 Gram(s) IV Push once  dextrose 50% Injectable 25 Gram(s) IV Push once  furosemide    Tablet 40 milliGRAM(s) Oral two times a day  heparin  Injectable 5000 Unit(s) SubCutaneous every 8 hours  influenza   Vaccine 0.5 milliLiter(s) IntraMuscular once  insulin lispro (HumaLOG) corrective regimen sliding scale   SubCutaneous three times a day before meals  labetalol 200 milliGRAM(s) Oral three times a day  multivitamin 1 Tablet(s) Oral daily  NIFEdipine XL 90 milliGRAM(s) Oral daily  silver sulfADIAZINE 1% Cream 1 Application(s) Topical daily    MEDICATIONS  (PRN):  acetaminophen   Tablet. 650 milliGRAM(s) Oral every 6 hours PRN Moderate Pain (4 - 6)  benzocaine 15 mG/menthol 3.6 mG Lozenge 1 Lozenge Oral every 2 hours PRN Sore Throat  dextrose Gel 1 Dose(s) Oral once PRN Blood Glucose LESS THAN 70 milliGRAM(s)/deciliter  glucagon  Injectable 1 milliGRAM(s) IntraMuscular once PRN Glucose LESS THAN 70 milligrams/deciliter        CAPILLARY BLOOD GLUCOSE      POCT Blood Glucose.: 202 mg/dL (23 Mar 2018 22:12)  POCT Blood Glucose.: 218 mg/dL (23 Mar 2018 17:07)  POCT Blood Glucose.: 151 mg/dL (23 Mar 2018 11:53)  POCT Blood Glucose.: 81 mg/dL (23 Mar 2018 08:07)    I&O's Summary    22 Mar 2018 07:01  -  23 Mar 2018 07:00  --------------------------------------------------------  IN: 480 mL / OUT: 1525 mL / NET: -1045 mL    23 Mar 2018 07:01  -  24 Mar 2018 06:57  --------------------------------------------------------  IN: 0 mL / OUT: 1350 mL / NET: -1350 mL        PHYSICAL EXAM:  GENERAL: no acute respiratory distress  HEAD:  Atraumatic, Normocephalic  EYES: EOMI, PERRLA, conjunctiva and sclera clear  NECK: Supple, No JVD  CHEST/LUNG: Bibasilar rales ; No wheeze  HEART: Regular rate and rhythm; No murmurs, rubs, or gallops  ABDOMEN: Soft, Nontender, Nondistended; Bowel sounds present  EXTREMITIES:  2+ Peripheral Pulses, No edema. s/p R BKA; L foot wound dressed c/d/i; unable to palpate DP   PSYCH: AAOx3  NEUROLOGY: non-focal  SKIN: No rashes or lesions    LABS:    WBC Trend: 6.27<--, 5.48<--, 5.20<--  Hb Trend: 11.2<--, 11.4<--, 12.6<--, 12.1<--, 10.9<--  Plt Trend: 287<--, 281<--, 291<--, 317<--, 231<--  03-23    143  |  100  |  72<H>  ----------------------------<  79  4.6   |  28  |  4.41<H>    Ca    9.2      23 Mar 2018 06:20  Phos  5.2     03-23  Mg     1.9     03-23      Creatinine Trend: 4.41<--, 4.66<--, 4.45<--, 4.29<--, 4.34<--, 4.22<--

## 2018-03-24 NOTE — PROGRESS NOTE ADULT - PROBLEM SELECTOR PLAN 4
-s/p angiogram with multi vessel disease s/p LE dopplers & vein mapping for Femoral-popliteal bypass. KENN .71 on left.   -c/w ASA and statin; Plan for OR on 03/27 for Fem-Pop Bypass.

## 2018-03-24 NOTE — PROGRESS NOTE ADULT - ATTENDING COMMENTS
Volume status stable.  I reiterated with the patient the likely possibility that he will require dialysis in the future.  He understood.

## 2018-03-24 NOTE — PROGRESS NOTE ADULT - PROBLEM SELECTOR PLAN 8
RSCI 3; patient is high risk for a moderate risk procedure    At present time and current condition, medically optimized for procedure.    Dalton Nunez D.O.  PGY-1 EM/IM  Pager #[phone]

## 2018-03-24 NOTE — PROGRESS NOTE ADULT - SUBJECTIVE AND OBJECTIVE BOX
[facility] DIVISION OF KIDNEY DISEASES AND HYPERTENSION -- [phone]   FOLLOW UP NOTE  --------------------------------------------------------------------------------  HPI:  63-year-old male with PMH of HTN, DM, right BKA, and CKD admitted for left foot infection. As per review of labs on [place]/[employer], Scr was 1.51 in 3/2017. As per PMD's office, Scr checked in 7/2017 was 2.30. Labs on admission (2/12) showed elevated Scr of 3.8 which peaked to 6.3 on 2/23 and is stable at 4.41 today. Pt. underwent vascular study/left leg angiogram on 3/8. Patient seen and examined today. Pt experienced SOB which has now resolved with initiation of IV diuretic therapy. Pt switched to oral diuretic therapy 3/22. Comfortable off nasal cannula at bedside. Pt. denies SOB, CP or N/V.     PAST HISTORY  --------------------------------------------------------------------------------  No significant changes to PMH, PSH, FHx, SHx, unless otherwise noted    ALLERGIES & MEDICATIONS  --------------------------------------------------------------------------------  Allergies    No Known Allergies    Intolerances      Standing Inpatient Medications  aspirin enteric coated 81 milliGRAM(s) Oral daily  atorvastatin 80 milliGRAM(s) Oral at bedtime  cyanocobalamin 1000 MICROGram(s) Oral daily  dextrose 5%. 1000 milliLiter(s) IV Continuous <Continuous>  dextrose 50% Injectable 25 Gram(s) IV Push once  dextrose 50% Injectable 25 Gram(s) IV Push once  furosemide    Tablet 40 milliGRAM(s) Oral two times a day  heparin  Injectable 5000 Unit(s) SubCutaneous every 8 hours  influenza   Vaccine 0.5 milliLiter(s) IntraMuscular once  insulin lispro (HumaLOG) corrective regimen sliding scale   SubCutaneous three times a day before meals  labetalol 200 milliGRAM(s) Oral three times a day  multivitamin 1 Tablet(s) Oral daily  NIFEdipine XL 90 milliGRAM(s) Oral daily  silver sulfADIAZINE 1% Cream 1 Application(s) Topical daily    PRN Inpatient Medications  acetaminophen   Tablet. 650 milliGRAM(s) Oral every 6 hours PRN  benzocaine 15 mG/menthol 3.6 mG Lozenge 1 Lozenge Oral every 2 hours PRN  dextrose Gel 1 Dose(s) Oral once PRN  glucagon  Injectable 1 milliGRAM(s) IntraMuscular once PRN      REVIEW OF SYSTEMS  --------------------------------------------------------------------------------  General: no fever  CVS: no chest pain  RESP: no sob, no cough  ABD: no abdominal pain  : no dysuria,  MSK: no edema     VITALS/PHYSICAL EXAM  --------------------------------------------------------------------------------  T(C): 36.7 (03-24-18 @ 05:21), Max: 36.8 (03-23-18 @ 10:35)  HR: 64 (03-24-18 @ 05:21) (60 - 64)  BP: 166/76 (03-24-18 @ 05:21) (135/60 - 166/76)  RR: 18 (03-24-18 @ 05:21) (18 - 18)  SpO2: 97% (03-24-18 @ 05:21) (96% - 98%)  Wt(kg): --        03-23-18 @ 07:01  -  03-24-18 @ 07:00  --------------------------------------------------------  IN: 0 mL / OUT: 1350 mL / NET: -1350 mL      Physical Exam:  	Gen: NAD  	HEENT: MMM  	Pulm: CTA B/L  	CV: S1S2  	Abd: Soft, +BS  	Ext: no edema + R BKA                      Neuro: Awake   	Skin: Warm and Dry   	Vascular access:    LABS/STUDIES  --------------------------------------------------------------------------------              11.3   6.40  >-----------<  270      [03-24-18 @ 06:50]              36.1     143  |  100  |  72  ----------------------------<  79      [03-23-18 @ 06:20]  4.6   |  28  |  4.41        Ca     9.2     [03-23-18 @ 06:20]      Mg     1.9     [03-23-18 @ 06:20]      Phos  5.2     [03-23-18 @ 06:20]            Creatinine Trend:  SCr 4.41 [03-23 @ 06:20]  SCr 4.66 [03-22 @ 06:50]  SCr 4.45 [03-21 @ 06:45]  SCr 4.29 [03-20 @ 18:40]  SCr 4.34 [03-20 @ 06:25]    Urinalysis - [02-25-18 @ 04:50]      Color PLYEL / Appearance CLEAR / SG 1.015 / pH 5.5      Gluc NEGATIVE / Ketone NEGATIVE  / Bili NEGATIVE / Urobili NORMAL       Blood TRACE / Protein 100 / Leuk Est TRACE / Nitrite NEGATIVE      RBC 0-2 / WBC 2-5 / Hyaline  / Gran  / Sq Epi OCC / Non Sq Epi  / Bacteria       Iron 21, TIBC 163, %sat --      [02-20-18 @ 05:00]  Ferritin 540.7      [02-20-18 @ 05:00]  HbA1c 5.9      [02-13-18 @ 07:11]  TSH 3.22      [03-08-18 @ 06:00]      Free Light Chains: kappa 20.50, lambda 11.20, ratio = 1.83 H      [02-25 @ 07:43] [facility] DIVISION OF KIDNEY DISEASES AND HYPERTENSION -- [phone]   FOLLOW UP NOTE  --------------------------------------------------------------------------------  HPI:  63-year-old male with PMH of HTN, DM, right BKA, and CKD admitted for left foot infection. As per review of labs on [place]/[employer], Scr was 1.51 in 3/2017. As per PMD's office, Scr checked in 7/2017 was 2.30. Labs on admission (2/12) showed elevated Scr of 3.8 which peaked to 6.3 on 2/23 and is stable at 4.41 today. Pt. underwent vascular study/left leg angiogram on 3/8. Patient seen and examined today. Pt experienced SOB which has now resolved with initiation of IV diuretic therapy. Pt switched to oral diuretic therapy 3/22. Comfortable off nasal cannula at bedside. Pt. denies SOB, CP or N/V.     PAST HISTORY  --------------------------------------------------------------------------------  No significant changes to PMH, PSH, FHx, SHx, unless otherwise noted    ALLERGIES & MEDICATIONS  --------------------------------------------------------------------------------  Allergies    No Known Allergies    Intolerances      Standing Inpatient Medications  aspirin enteric coated 81 milliGRAM(s) Oral daily  atorvastatin 80 milliGRAM(s) Oral at bedtime  cyanocobalamin 1000 MICROGram(s) Oral daily  dextrose 5%. 1000 milliLiter(s) IV Continuous <Continuous>  dextrose 50% Injectable 25 Gram(s) IV Push once  dextrose 50% Injectable 25 Gram(s) IV Push once  furosemide    Tablet 40 milliGRAM(s) Oral two times a day  heparin  Injectable 5000 Unit(s) SubCutaneous every 8 hours  influenza   Vaccine 0.5 milliLiter(s) IntraMuscular once  insulin lispro (HumaLOG) corrective regimen sliding scale   SubCutaneous three times a day before meals  labetalol 200 milliGRAM(s) Oral three times a day  multivitamin 1 Tablet(s) Oral daily  NIFEdipine XL 90 milliGRAM(s) Oral daily  silver sulfADIAZINE 1% Cream 1 Application(s) Topical daily    PRN Inpatient Medications  acetaminophen   Tablet. 650 milliGRAM(s) Oral every 6 hours PRN  benzocaine 15 mG/menthol 3.6 mG Lozenge 1 Lozenge Oral every 2 hours PRN  dextrose Gel 1 Dose(s) Oral once PRN  glucagon  Injectable 1 milliGRAM(s) IntraMuscular once PRN      REVIEW OF SYSTEMS  --------------------------------------------------------------------------------  General: no fever  CVS: no chest pain  RESP: no sob, no cough  ABD: no abdominal pain  : no dysuria,  MSK: no edema     VITALS/PHYSICAL EXAM  --------------------------------------------------------------------------------  T(C): 36.7 (03-24-18 @ 05:21), Max: 36.8 (03-23-18 @ 10:35)  HR: 64 (03-24-18 @ 05:21) (60 - 64)  BP: 166/76 (03-24-18 @ 05:21) (135/60 - 166/76)  RR: 18 (03-24-18 @ 05:21) (18 - 18)  SpO2: 97% (03-24-18 @ 05:21) (96% - 98%)  Wt(kg): --        03-23-18 @ 07:01  -  03-24-18 @ 07:00  --------------------------------------------------------  IN: 0 mL / OUT: 1350 mL / NET: -1350 mL      Physical Exam:  	Gen: NAD  	HEENT: MMM  	Pulm: decreased breath sounds  B/L  	CV: S1S2  	Abd: Soft, +BS  	Ext: no edema + R BKA                      Neuro: Awake, alert   	Skin: Warm and Dry   	    LABS/STUDIES  --------------------------------------------------------------------------------              11.3   6.40  >-----------<  270      [03-24-18 @ 06:50]              36.1     143  |  100  |  72  ----------------------------<  79      [03-23-18 @ 06:20]  4.6   |  28  |  4.41        Ca     9.2     [03-23-18 @ 06:20]      Mg     1.9     [03-23-18 @ 06:20]      Phos  5.2     [03-23-18 @ 06:20]            Creatinine Trend:  SCr 4.41 [03-23 @ 06:20]  SCr 4.66 [03-22 @ 06:50]  SCr 4.45 [03-21 @ 06:45]  SCr 4.29 [03-20 @ 18:40]  SCr 4.34 [03-20 @ 06:25]    Urinalysis - [02-25-18 @ 04:50]      Color PLYEL / Appearance CLEAR / SG 1.015 / pH 5.5      Gluc NEGATIVE / Ketone NEGATIVE  / Bili NEGATIVE / Urobili NORMAL       Blood TRACE / Protein 100 / Leuk Est TRACE / Nitrite NEGATIVE      RBC 0-2 / WBC 2-5 / Hyaline  / Gran  / Sq Epi OCC / Non Sq Epi  / Bacteria       Iron 21, TIBC 163, %sat --      [02-20-18 @ 05:00]  Ferritin 540.7      [02-20-18 @ 05:00]  HbA1c 5.9      [02-13-18 @ 07:11]  TSH 3.22      [03-08-18 @ 06:00]      Free Light Chains: kappa 20.50, lambda 11.20, ratio = 1.83 H      [02-25 @ 07:43]

## 2018-03-25 LAB
BUN SERPL-MCNC: 78 MG/DL — HIGH (ref 7–23)
CALCIUM SERPL-MCNC: 9.1 MG/DL — SIGNIFICANT CHANGE UP (ref 8.4–10.5)
CHLORIDE SERPL-SCNC: 101 MMOL/L — SIGNIFICANT CHANGE UP (ref 98–107)
CO2 SERPL-SCNC: 25 MMOL/L — SIGNIFICANT CHANGE UP (ref 22–31)
CREAT SERPL-MCNC: 4.53 MG/DL — HIGH (ref 0.5–1.3)
GLUCOSE BLDC GLUCOMTR-MCNC: 101 MG/DL — HIGH (ref 70–99)
GLUCOSE BLDC GLUCOMTR-MCNC: 110 MG/DL — HIGH (ref 70–99)
GLUCOSE BLDC GLUCOMTR-MCNC: 133 MG/DL — HIGH (ref 70–99)
GLUCOSE BLDC GLUCOMTR-MCNC: 136 MG/DL — HIGH (ref 70–99)
GLUCOSE SERPL-MCNC: 81 MG/DL — SIGNIFICANT CHANGE UP (ref 70–99)
HCT VFR BLD CALC: 36.6 % — LOW (ref 39–50)
HGB BLD-MCNC: 11.4 G/DL — LOW (ref 13–17)
MAGNESIUM SERPL-MCNC: 2.1 MG/DL — SIGNIFICANT CHANGE UP (ref 1.6–2.6)
MCHC RBC-ENTMCNC: 26.3 PG — LOW (ref 27–34)
MCHC RBC-ENTMCNC: 31.1 % — LOW (ref 32–36)
MCV RBC AUTO: 84.5 FL — SIGNIFICANT CHANGE UP (ref 80–100)
NRBC # FLD: 0 — SIGNIFICANT CHANGE UP
PHOSPHATE SERPL-MCNC: 4.6 MG/DL — HIGH (ref 2.5–4.5)
PLATELET # BLD AUTO: 268 K/UL — SIGNIFICANT CHANGE UP (ref 150–400)
PMV BLD: 10.7 FL — SIGNIFICANT CHANGE UP (ref 7–13)
POTASSIUM SERPL-MCNC: 4.8 MMOL/L — SIGNIFICANT CHANGE UP (ref 3.5–5.3)
POTASSIUM SERPL-SCNC: 4.8 MMOL/L — SIGNIFICANT CHANGE UP (ref 3.5–5.3)
RBC # BLD: 4.33 M/UL — SIGNIFICANT CHANGE UP (ref 4.2–5.8)
RBC # FLD: 14.2 % — SIGNIFICANT CHANGE UP (ref 10.3–14.5)
SODIUM SERPL-SCNC: 144 MMOL/L — SIGNIFICANT CHANGE UP (ref 135–145)
WBC # BLD: 5.67 K/UL — SIGNIFICANT CHANGE UP (ref 3.8–10.5)
WBC # FLD AUTO: 5.67 K/UL — SIGNIFICANT CHANGE UP (ref 3.8–10.5)

## 2018-03-25 PROCEDURE — 99233 SBSQ HOSP IP/OBS HIGH 50: CPT | Mod: GC

## 2018-03-25 RX ADMIN — HEPARIN SODIUM 5000 UNIT(S): 5000 INJECTION INTRAVENOUS; SUBCUTANEOUS at 22:34

## 2018-03-25 RX ADMIN — HEPARIN SODIUM 5000 UNIT(S): 5000 INJECTION INTRAVENOUS; SUBCUTANEOUS at 13:12

## 2018-03-25 RX ADMIN — Medication 90 MILLIGRAM(S): at 05:38

## 2018-03-25 RX ADMIN — ATORVASTATIN CALCIUM 80 MILLIGRAM(S): 80 TABLET, FILM COATED ORAL at 22:34

## 2018-03-25 RX ADMIN — Medication 1 APPLICATION(S): at 13:16

## 2018-03-25 RX ADMIN — PREGABALIN 1000 MICROGRAM(S): 225 CAPSULE ORAL at 13:12

## 2018-03-25 RX ADMIN — HEPARIN SODIUM 5000 UNIT(S): 5000 INJECTION INTRAVENOUS; SUBCUTANEOUS at 05:38

## 2018-03-25 RX ADMIN — Medication 40 MILLIGRAM(S): at 17:17

## 2018-03-25 RX ADMIN — Medication 40 MILLIGRAM(S): at 05:38

## 2018-03-25 RX ADMIN — Medication 1 TABLET(S): at 13:11

## 2018-03-25 RX ADMIN — Medication 200 MILLIGRAM(S): at 05:38

## 2018-03-25 RX ADMIN — Medication 200 MILLIGRAM(S): at 13:11

## 2018-03-25 RX ADMIN — Medication 200 MILLIGRAM(S): at 22:34

## 2018-03-25 RX ADMIN — Medication 81 MILLIGRAM(S): at 13:12

## 2018-03-25 NOTE — PROGRESS NOTE ADULT - PROBLEM SELECTOR PLAN 1
Pt. with JANY on CKD in the setting of infection and diarrhea. CKD in the setting of long-standing DM. Scr was 1.5 in 3/2017, increased to 2.30 in 7/2017. Scr on admission (2/12) was elevated at 3.81, peaked to 6.3 on 2/23 and Scr at 4.5 today. Pt. also underwent vascular study/left leg angiogram on 3/8.  Monitor BMP and urine output. Avoid any potential nephrotoxins

## 2018-03-25 NOTE — PROGRESS NOTE ADULT - PROBLEM SELECTOR PLAN 8
RSCI 3; patient is high risk for a moderate risk procedure    At present time and current condition, medically optimized for procedure.    Dalton Nunez D.O.  PGY-1 EM/IM  Pager #[phone]

## 2018-03-25 NOTE — PROGRESS NOTE ADULT - ATTENDING COMMENTS
patient clinically stable at this time.  continue with diuretic regimen.  would hold diuretics if contrast needed.

## 2018-03-25 NOTE — PROGRESS NOTE ADULT - SUBJECTIVE AND OBJECTIVE BOX
[facility] DIVISION OF KIDNEY DISEASES AND HYPERTENSION -- [phone]   FOLLOW UP NOTE  --------------------------------------------------------------------------------  HPI:  63-year-old male with PMH of HTN, DM, right BKA, and CKD admitted for left foot infection. As per review of labs on [place]/[employer], Scr was 1.51 in 3/2017. As per PMD's office, Scr checked in 7/2017 was 2.30. Labs on admission (2/12) showed elevated Scr of 3.8 which peaked to 6.3 on 2/23 and is stable at 4.41 today. Pt. underwent vascular study/left leg angiogram on 3/8. Patient seen and examined today. Pt experienced SOB which has now resolved with initiation of IV diuretic therapy. Pt switched to oral diuretic therapy 3/22. Comfortable off nasal cannula at bedside. Pt. denies SOB, CP or N/V.       PAST HISTORY  --------------------------------------------------------------------------------  No significant changes to PMH, PSH, FHx, SHx, unless otherwise noted    ALLERGIES & MEDICATIONS  --------------------------------------------------------------------------------  Allergies    No Known Allergies    Intolerances      Standing Inpatient Medications  aspirin enteric coated 81 milliGRAM(s) Oral daily  atorvastatin 80 milliGRAM(s) Oral at bedtime  cyanocobalamin 1000 MICROGram(s) Oral daily  dextrose 5%. 1000 milliLiter(s) IV Continuous <Continuous>  dextrose 50% Injectable 25 Gram(s) IV Push once  dextrose 50% Injectable 25 Gram(s) IV Push once  furosemide    Tablet 40 milliGRAM(s) Oral two times a day  heparin  Injectable 5000 Unit(s) SubCutaneous every 8 hours  influenza   Vaccine 0.5 milliLiter(s) IntraMuscular once  insulin lispro (HumaLOG) corrective regimen sliding scale   SubCutaneous three times a day before meals  labetalol 200 milliGRAM(s) Oral three times a day  multivitamin 1 Tablet(s) Oral daily  NIFEdipine XL 90 milliGRAM(s) Oral daily  silver sulfADIAZINE 1% Cream 1 Application(s) Topical daily    PRN Inpatient Medications  acetaminophen   Tablet. 650 milliGRAM(s) Oral every 6 hours PRN  benzocaine 15 mG/menthol 3.6 mG Lozenge 1 Lozenge Oral every 2 hours PRN  dextrose Gel 1 Dose(s) Oral once PRN  glucagon  Injectable 1 milliGRAM(s) IntraMuscular once PRN      REVIEW OF SYSTEMS  --------------------------------------------------------------------------------  General: no fever  CVS: no chest pain  RESP: no sob, no cough  ABD: no abdominal pain  : no dysuria,  MSK: no edema     VITALS/PHYSICAL EXAM  --------------------------------------------------------------------------------  T(C): 36.7 (03-25-18 @ 05:34), Max: 37.1 (03-24-18 @ 13:33)  HR: 64 (03-25-18 @ 05:34) (62 - 66)  BP: 140/57 (03-25-18 @ 05:34) (135/62 - 152/63)  RR: 18 (03-25-18 @ 05:34) (18 - 18)  SpO2: 94% (03-25-18 @ 05:34) (94% - 98%)  Wt(kg): --        03-24-18 @ 07:01  -  03-25-18 @ 07:00  --------------------------------------------------------  IN: 0 mL / OUT: 1200 mL / NET: -1200 mL    03-25-18 @ 07:01  -  03-25-18 @ 10:02  --------------------------------------------------------  IN: 0 mL / OUT: 120 mL / NET: -120 mL      Physical Exam:  	  Gen: NAD  	HEENT: MMM  	Pulm: decreased breath sounds  B/L  	CV: S1S2  	Abd: Soft, +BS  	Ext: no edema + R BKA                      Neuro: Awake, alert   	Skin: Warm and Dry   	    LABS/STUDIES  --------------------------------------------------------------------------------              11.4   5.67  >-----------<  268      [03-25-18 @ 06:55]              36.6     144  |  101  |  78  ----------------------------<  81      [03-25-18 @ 06:55]  4.8   |  25  |  4.53        Ca     9.1     [03-25-18 @ 06:55]      Mg     2.1     [03-25-18 @ 06:55]      Phos  4.6     [03-25-18 @ 06:55]            Creatinine Trend:  SCr 4.53 [03-25 @ 06:55]  SCr 4.19 [03-24 @ 06:50]  SCr 4.41 [03-23 @ 06:20]  SCr 4.66 [03-22 @ 06:50]  SCr 4.45 [03-21 @ 06:45]    Urinalysis - [02-25-18 @ 04:50]      Color PLYEL / Appearance CLEAR / SG 1.015 / pH 5.5      Gluc NEGATIVE / Ketone NEGATIVE  / Bili NEGATIVE / Urobili NORMAL       Blood TRACE / Protein 100 / Leuk Est TRACE / Nitrite NEGATIVE      RBC 0-2 / WBC 2-5 / Hyaline  / Gran  / Sq Epi OCC / Non Sq Epi  / Bacteria       Iron 21, TIBC 163, %sat --      [02-20-18 @ 05:00]  Ferritin 540.7      [02-20-18 @ 05:00]  HbA1c 5.9      [02-13-18 @ 07:11]  TSH 3.22      [03-08-18 @ 06:00]      Free Light Chains: kappa 20.50, lambda 11.20, ratio = 1.83 H      [02-25 @ 07:43]

## 2018-03-25 NOTE — PROGRESS NOTE ADULT - SUBJECTIVE AND OBJECTIVE BOX
Patient is a 63y old  Male who presents with a chief complaint of 63M PMH DM, HTN, CKD p/w L foot pain x 3 days. (19 Feb 2018 10:39)      SUBJECTIVE / OVERNIGHT EVENTS: No acute overnight events. Output 1.05 L in last 24 hours. Denies dyspnea at rest or orthopnea.     MEDICATIONS  (STANDING):  aspirin enteric coated 81 milliGRAM(s) Oral daily  atorvastatin 80 milliGRAM(s) Oral at bedtime  cyanocobalamin 1000 MICROGram(s) Oral daily  dextrose 5%. 1000 milliLiter(s) (50 mL/Hr) IV Continuous <Continuous>  dextrose 50% Injectable 25 Gram(s) IV Push once  dextrose 50% Injectable 25 Gram(s) IV Push once  furosemide    Tablet 40 milliGRAM(s) Oral two times a day  heparin  Injectable 5000 Unit(s) SubCutaneous every 8 hours  influenza   Vaccine 0.5 milliLiter(s) IntraMuscular once  insulin lispro (HumaLOG) corrective regimen sliding scale   SubCutaneous three times a day before meals  labetalol 200 milliGRAM(s) Oral three times a day  multivitamin 1 Tablet(s) Oral daily  NIFEdipine XL 90 milliGRAM(s) Oral daily  silver sulfADIAZINE 1% Cream 1 Application(s) Topical daily    MEDICATIONS  (PRN):  acetaminophen   Tablet. 650 milliGRAM(s) Oral every 6 hours PRN Moderate Pain (4 - 6)  benzocaine 15 mG/menthol 3.6 mG Lozenge 1 Lozenge Oral every 2 hours PRN Sore Throat  dextrose Gel 1 Dose(s) Oral once PRN Blood Glucose LESS THAN 70 milliGRAM(s)/deciliter  glucagon  Injectable 1 milliGRAM(s) IntraMuscular once PRN Glucose LESS THAN 70 milligrams/deciliter        CAPILLARY BLOOD GLUCOSE      POCT Blood Glucose.: 148 mg/dL (24 Mar 2018 22:55)  POCT Blood Glucose.: 166 mg/dL (24 Mar 2018 16:19)  POCT Blood Glucose.: 194 mg/dL (24 Mar 2018 12:04)    I&O's Summary    24 Mar 2018 07:01  -  25 Mar 2018 07:00  --------------------------------------------------------  IN: 0 mL / OUT: 1200 mL / NET: -1200 mL    25 Mar 2018 07:01  -  25 Mar 2018 08:35  --------------------------------------------------------  IN: 0 mL / OUT: 120 mL / NET: -120 mL        PHYSICAL EXAM:  GENERAL: NAD, well-developed  HEAD:  Atraumatic, Normocephalic  EYES: EOMI, PERRLA, conjunctiva and sclera clear  NECK: Supple, No JVD  CHEST/LUNG: Clear to auscultation bilaterally; No wheeze  HEART: Regular rate and rhythm; No murmurs, rubs, or gallops  ABDOMEN: Soft, Nontender, Nondistended; Bowel sounds present  EXTREMITIES:  2+ Peripheral Pulses, No clubbing, cyanosis, or edema  PSYCH: AAOx3  NEUROLOGY: non-focal  SKIN: No rashes or lesions    LABS:  (03-25 @ 06:55)                        11.4  5.67 )-----------( 268                 36.6    Neutrophils = -- (--%)  Lymphocytes = -- (--%)  Eosinophils = -- (--%)  Basophils = -- (--%)  Monocytes = -- (--%)  Bands = --%    WBC Trend: 5.67<--, 6.40<--, 6.27<--  Hb Trend: 11.4<--, 11.3<--, 11.2<--, 11.4<--, 12.6<--  Plt Trend: 268<--, 270<--, 287<--, 281<--, 291<--  03-24    141  |  98  |  72<H>  ----------------------------<  81  4.4   |  27  |  4.19<H>    Ca    9.5      24 Mar 2018 06:50  Phos  4.9     03-24  Mg     2.0     03-24      Creatinine Trend: 4.19<--, 4.41<--, 4.66<--, 4.45<--, 4.29<--, 4.34<-- Patient is a 63y old  Male who presents with a chief complaint of 63M PMH DM, HTN, CKD p/w L foot pain x 3 days. (19 Feb 2018 10:39)      SUBJECTIVE / OVERNIGHT EVENTS: No acute overnight events. Output 1.05 L in last 24 hours. Denies dyspnea at rest or orthopnea. Able to lie flat.     MEDICATIONS  (STANDING):  aspirin enteric coated 81 milliGRAM(s) Oral daily  atorvastatin 80 milliGRAM(s) Oral at bedtime  cyanocobalamin 1000 MICROGram(s) Oral daily  dextrose 5%. 1000 milliLiter(s) (50 mL/Hr) IV Continuous <Continuous>  dextrose 50% Injectable 25 Gram(s) IV Push once  dextrose 50% Injectable 25 Gram(s) IV Push once  furosemide    Tablet 40 milliGRAM(s) Oral two times a day  heparin  Injectable 5000 Unit(s) SubCutaneous every 8 hours  influenza   Vaccine 0.5 milliLiter(s) IntraMuscular once  insulin lispro (HumaLOG) corrective regimen sliding scale   SubCutaneous three times a day before meals  labetalol 200 milliGRAM(s) Oral three times a day  multivitamin 1 Tablet(s) Oral daily  NIFEdipine XL 90 milliGRAM(s) Oral daily  silver sulfADIAZINE 1% Cream 1 Application(s) Topical daily    MEDICATIONS  (PRN):  acetaminophen   Tablet. 650 milliGRAM(s) Oral every 6 hours PRN Moderate Pain (4 - 6)  benzocaine 15 mG/menthol 3.6 mG Lozenge 1 Lozenge Oral every 2 hours PRN Sore Throat  dextrose Gel 1 Dose(s) Oral once PRN Blood Glucose LESS THAN 70 milliGRAM(s)/deciliter  glucagon  Injectable 1 milliGRAM(s) IntraMuscular once PRN Glucose LESS THAN 70 milligrams/deciliter        CAPILLARY BLOOD GLUCOSE      POCT Blood Glucose.: 148 mg/dL (24 Mar 2018 22:55)  POCT Blood Glucose.: 166 mg/dL (24 Mar 2018 16:19)  POCT Blood Glucose.: 194 mg/dL (24 Mar 2018 12:04)    I&O's Summary    24 Mar 2018 07:01  -  25 Mar 2018 07:00  --------------------------------------------------------  IN: 0 mL / OUT: 1200 mL / NET: -1200 mL    25 Mar 2018 07:01  -  25 Mar 2018 08:35  --------------------------------------------------------  IN: 0 mL / OUT: 120 mL / NET: -120 mL        PHYSICAL EXAM:  GENERAL: NAD, well-developed  HEAD:  Atraumatic, Normocephalic  EYES: EOMI, PERRLA, conjunctiva and sclera clear  NECK: Supple, No JVD  CHEST/LUNG: Clear to auscultation bilaterally; No wheeze  HEART: Regular rate and rhythm; No murmurs, rubs, or gallops  ABDOMEN: Soft, Nontender, Nondistended; Bowel sounds present  EXTREMITIES:  2+ Peripheral Pulses, No clubbing, cyanosis, or edema  PSYCH: AAOx3  NEUROLOGY: non-focal  SKIN: No rashes or lesions    LABS:  (03-25 @ 06:55)                        11.4  5.67 )-----------( 268                 36.6    Neutrophils = -- (--%)  Lymphocytes = -- (--%)  Eosinophils = -- (--%)  Basophils = -- (--%)  Monocytes = -- (--%)  Bands = --%    WBC Trend: 5.67<--, 6.40<--, 6.27<--  Hb Trend: 11.4<--, 11.3<--, 11.2<--, 11.4<--, 12.6<--  Plt Trend: 268<--, 270<--, 287<--, 281<--, 291<--  03-24    141  |  98  |  72<H>  ----------------------------<  81  4.4   |  27  |  4.19<H>    Ca    9.5      24 Mar 2018 06:50  Phos  4.9     03-24  Mg     2.0     03-24      Creatinine Trend: 4.19<--, 4.41<--, 4.66<--, 4.45<--, 4.29<--, 4.34<-- Patient is a 63y old  Male who presents with a chief complaint of 63M PMH DM, HTN, CKD p/w L foot pain x 3 days. (19 Feb 2018 10:39)      SUBJECTIVE / OVERNIGHT EVENTS: No acute overnight events. Output 1.05 L in last 24 hours. Denies dyspnea at rest or orthopnea. Able to lie flat.     MEDICATIONS  (STANDING):  aspirin enteric coated 81 milliGRAM(s) Oral daily  atorvastatin 80 milliGRAM(s) Oral at bedtime  cyanocobalamin 1000 MICROGram(s) Oral daily  dextrose 5%. 1000 milliLiter(s) (50 mL/Hr) IV Continuous <Continuous>  dextrose 50% Injectable 25 Gram(s) IV Push once  dextrose 50% Injectable 25 Gram(s) IV Push once  furosemide    Tablet 40 milliGRAM(s) Oral two times a day  heparin  Injectable 5000 Unit(s) SubCutaneous every 8 hours  influenza   Vaccine 0.5 milliLiter(s) IntraMuscular once  insulin lispro (HumaLOG) corrective regimen sliding scale   SubCutaneous three times a day before meals  labetalol 200 milliGRAM(s) Oral three times a day  multivitamin 1 Tablet(s) Oral daily  NIFEdipine XL 90 milliGRAM(s) Oral daily  silver sulfADIAZINE 1% Cream 1 Application(s) Topical daily    MEDICATIONS  (PRN):  acetaminophen   Tablet. 650 milliGRAM(s) Oral every 6 hours PRN Moderate Pain (4 - 6)  benzocaine 15 mG/menthol 3.6 mG Lozenge 1 Lozenge Oral every 2 hours PRN Sore Throat  dextrose Gel 1 Dose(s) Oral once PRN Blood Glucose LESS THAN 70 milliGRAM(s)/deciliter  glucagon  Injectable 1 milliGRAM(s) IntraMuscular once PRN Glucose LESS THAN 70 milligrams/deciliter        CAPILLARY BLOOD GLUCOSE      POCT Blood Glucose.: 148 mg/dL (24 Mar 2018 22:55)  POCT Blood Glucose.: 166 mg/dL (24 Mar 2018 16:19)  POCT Blood Glucose.: 194 mg/dL (24 Mar 2018 12:04)    I&O's Summary    24 Mar 2018 07:01  -  25 Mar 2018 07:00  --------------------------------------------------------  IN: 0 mL / OUT: 1200 mL / NET: -1200 mL    25 Mar 2018 07:01  -  25 Mar 2018 08:35  --------------------------------------------------------  IN: 0 mL / OUT: 120 mL / NET: -120 mL        PHYSICAL EXAM:  GENERAL: no acute distress; on room air   HEAD:  Atraumatic, Normocephalic  EYES: PERRLA, conjunctiva and sclera clear  NECK: Supple, No JVD  CHEST/LUNG: Bibasilar rales R >L; No wheeze  HEART: Regular rate and rhythm; No murmurs, rubs, or gallops  ABDOMEN: Soft, Nontender, Nondistended; Bowel sounds present  EXTREMITIES:  2+ Peripheral Pulses, No edema; s/p R BKA; L foot wound dressed c/d/i.   PSYCH: AAOx3  NEUROLOGY: non-focal  SKIN: No rashes or lesions    LABS:  (03-25 @ 06:55)                        11.4  5.67 )-----------( 268                 36.6    Neutrophils = -- (--%)  Lymphocytes = -- (--%)  Eosinophils = -- (--%)  Basophils = -- (--%)  Monocytes = -- (--%)  Bands = --%    WBC Trend: 5.67<--, 6.40<--, 6.27<--  Hb Trend: 11.4<--, 11.3<--, 11.2<--, 11.4<--, 12.6<--  Plt Trend: 268<--, 270<--, 287<--, 281<--, 291<--  03-24    141  |  98  |  72<H>  ----------------------------<  81  4.4   |  27  |  4.19<H>    Ca    9.5      24 Mar 2018 06:50  Phos  4.9     03-24  Mg     2.0     03-24      Creatinine Trend: 4.19<--, 4.41<--, 4.66<--, 4.45<--, 4.29<--, 4.34<--

## 2018-03-25 NOTE — PROGRESS NOTE ADULT - PROBLEM SELECTOR PLAN 1
-Pt had pulmonary edema and significant b/l pleural effusions IV lasix; now greatly improved s/p bumex gtt x3 days   - c/w Lasix to BID; Cr stable; patient maintaining euvolemic status   - Monitor I's and O's & daily weight  - Tolerates lying supine -Pt had pulmonary edema and significant b/l pleural effusions IV lasix; now greatly improved s/p bumex gtt x3 days   -decrease Lasix to 60 mg QD as SCr slightly uptrending; patient maintaining euvolemic status   - Monitor I's and O's & daily weight  - Tolerates lying supine -Pt had pulmonary edema and significant b/l pleural effusions IV lasix; now greatly improved s/p bumex gtt x3 days   -c/w Lasix 40 BID; SCr slight uptrending today-decrease dose tomorrow if continues to increase; patient maintaining euvolemic status   - Monitor I's and O's & daily weight  - Tolerates lying supine

## 2018-03-25 NOTE — PROGRESS NOTE ADULT - ATTENDING COMMENTS
Pt was seen and examined by me this morning.  Case d/w Dr. Nunez and I agree with findings and plan as detailed above.    No overnight events.  No fevers, chills, nausea, vomiting.  No CP or SOB.  Able to light flat without dyspnea.    This is a 63 M PMH of DM, HTN, CKD, Rt BKA in 2009 who p/w sepsis 2/2 cellulitis and LLE SFA disease, c/b ATN, planned for Lt fem-pop bypass but canceled due to acute hypoxic respiratory failure 2/2 fluid overload, likely 2/2 acute diastolic CHF, now resolving with diuresis.    # Resolved Hypoxic respiratory failure/Acute Diastolic HF  likely secondary to volume overload  -remains normoxic on room air  -continue lasix 40mg BID  -fluid restrict to <1.5 L per day  -continue monitoring I/Os  -monitoring electrolytes  -incentive spirometry    # JANY likely ATN on underlying CKD  -monitoring creat - stable at present; no indication for HD presently  - renal f/u appreciated    #PVD  -planned for Fem-pop bypass (3/27)  -d/w anesthesia regarding pre-procedure evaluation of periop status - currently pt euvolemic; c/w incentive spirometry  -no indication for pre-op HD but may be needed afterwards.  -will closely monitor I/O, monitor BMP

## 2018-03-25 NOTE — PROGRESS NOTE ADULT - PROBLEM SELECTOR PLAN 2
Pt with hypervolemia in the setting of CKD. Pt. appears clinically stable.  Continue lasix 40mg BID. Can consider tapering to daily dose.  Monitor weight daily and low salt diet Pt with hypervolemia in the setting of CKD. Pt. appears clinically stable.  Continue lasix 40mg BID.  Monitor weight daily and low salt diet

## 2018-03-26 LAB
APTT BLD: 45 SEC — HIGH (ref 27.5–37.4)
BLD GP AB SCN SERPL QL: NEGATIVE — SIGNIFICANT CHANGE UP
BUN SERPL-MCNC: 90 MG/DL — HIGH (ref 7–23)
BUN SERPL-MCNC: 99 MG/DL — HIGH (ref 7–23)
CALCIUM SERPL-MCNC: 9.1 MG/DL — SIGNIFICANT CHANGE UP (ref 8.4–10.5)
CALCIUM SERPL-MCNC: 9.2 MG/DL — SIGNIFICANT CHANGE UP (ref 8.4–10.5)
CHLORIDE SERPL-SCNC: 100 MMOL/L — SIGNIFICANT CHANGE UP (ref 98–107)
CHLORIDE SERPL-SCNC: 101 MMOL/L — SIGNIFICANT CHANGE UP (ref 98–107)
CO2 SERPL-SCNC: 25 MMOL/L — SIGNIFICANT CHANGE UP (ref 22–31)
CO2 SERPL-SCNC: 28 MMOL/L — SIGNIFICANT CHANGE UP (ref 22–31)
CREAT SERPL-MCNC: 4.65 MG/DL — HIGH (ref 0.5–1.3)
CREAT SERPL-MCNC: 4.69 MG/DL — HIGH (ref 0.5–1.3)
GLUCOSE BLDC GLUCOMTR-MCNC: 177 MG/DL — HIGH (ref 70–99)
GLUCOSE BLDC GLUCOMTR-MCNC: 90 MG/DL — SIGNIFICANT CHANGE UP (ref 70–99)
GLUCOSE SERPL-MCNC: 187 MG/DL — HIGH (ref 70–99)
GLUCOSE SERPL-MCNC: 95 MG/DL — SIGNIFICANT CHANGE UP (ref 70–99)
HCT VFR BLD CALC: 34.9 % — LOW (ref 39–50)
HGB BLD-MCNC: 11 G/DL — LOW (ref 13–17)
INR BLD: 1.02 — SIGNIFICANT CHANGE UP (ref 0.88–1.17)
MAGNESIUM SERPL-MCNC: 2.2 MG/DL — SIGNIFICANT CHANGE UP (ref 1.6–2.6)
MCHC RBC-ENTMCNC: 26.9 PG — LOW (ref 27–34)
MCHC RBC-ENTMCNC: 31.5 % — LOW (ref 32–36)
MCV RBC AUTO: 85.3 FL — SIGNIFICANT CHANGE UP (ref 80–100)
NRBC # FLD: 0 — SIGNIFICANT CHANGE UP
PHOSPHATE SERPL-MCNC: 4.8 MG/DL — HIGH (ref 2.5–4.5)
PLATELET # BLD AUTO: 289 K/UL — SIGNIFICANT CHANGE UP (ref 150–400)
PMV BLD: 11.4 FL — SIGNIFICANT CHANGE UP (ref 7–13)
POTASSIUM SERPL-MCNC: 4.6 MMOL/L — SIGNIFICANT CHANGE UP (ref 3.5–5.3)
POTASSIUM SERPL-MCNC: 5.3 MMOL/L — SIGNIFICANT CHANGE UP (ref 3.5–5.3)
POTASSIUM SERPL-SCNC: 4.6 MMOL/L — SIGNIFICANT CHANGE UP (ref 3.5–5.3)
POTASSIUM SERPL-SCNC: 5.3 MMOL/L — SIGNIFICANT CHANGE UP (ref 3.5–5.3)
PROTHROM AB SERPL-ACNC: 11.3 SEC — SIGNIFICANT CHANGE UP (ref 9.8–13.1)
RBC # BLD: 4.09 M/UL — LOW (ref 4.2–5.8)
RBC # FLD: 14.3 % — SIGNIFICANT CHANGE UP (ref 10.3–14.5)
RH IG SCN BLD-IMP: POSITIVE — SIGNIFICANT CHANGE UP
SODIUM SERPL-SCNC: 142 MMOL/L — SIGNIFICANT CHANGE UP (ref 135–145)
SODIUM SERPL-SCNC: 143 MMOL/L — SIGNIFICANT CHANGE UP (ref 135–145)
WBC # BLD: 5.69 K/UL — SIGNIFICANT CHANGE UP (ref 3.8–10.5)
WBC # FLD AUTO: 5.69 K/UL — SIGNIFICANT CHANGE UP (ref 3.8–10.5)

## 2018-03-26 PROCEDURE — 99232 SBSQ HOSP IP/OBS MODERATE 35: CPT

## 2018-03-26 PROCEDURE — 99233 SBSQ HOSP IP/OBS HIGH 50: CPT | Mod: GC

## 2018-03-26 PROCEDURE — 71045 X-RAY EXAM CHEST 1 VIEW: CPT | Mod: 26

## 2018-03-26 PROCEDURE — 93010 ELECTROCARDIOGRAM REPORT: CPT | Mod: 76

## 2018-03-26 RX ORDER — FUROSEMIDE 40 MG
60 TABLET ORAL DAILY
Refills: 0 | Status: DISCONTINUED | OUTPATIENT
Start: 2018-03-27 | End: 2018-04-09

## 2018-03-26 RX ADMIN — Medication 200 MILLIGRAM(S): at 13:24

## 2018-03-26 RX ADMIN — Medication 1 APPLICATION(S): at 12:08

## 2018-03-26 RX ADMIN — HEPARIN SODIUM 5000 UNIT(S): 5000 INJECTION INTRAVENOUS; SUBCUTANEOUS at 21:46

## 2018-03-26 RX ADMIN — Medication 40 MILLIGRAM(S): at 05:40

## 2018-03-26 RX ADMIN — ATORVASTATIN CALCIUM 80 MILLIGRAM(S): 80 TABLET, FILM COATED ORAL at 21:46

## 2018-03-26 RX ADMIN — PREGABALIN 1000 MICROGRAM(S): 225 CAPSULE ORAL at 12:04

## 2018-03-26 RX ADMIN — Medication 1: at 12:05

## 2018-03-26 RX ADMIN — Medication 81 MILLIGRAM(S): at 12:04

## 2018-03-26 RX ADMIN — HEPARIN SODIUM 5000 UNIT(S): 5000 INJECTION INTRAVENOUS; SUBCUTANEOUS at 13:24

## 2018-03-26 RX ADMIN — HEPARIN SODIUM 5000 UNIT(S): 5000 INJECTION INTRAVENOUS; SUBCUTANEOUS at 05:40

## 2018-03-26 RX ADMIN — Medication 1 TABLET(S): at 12:04

## 2018-03-26 RX ADMIN — Medication 200 MILLIGRAM(S): at 21:45

## 2018-03-26 RX ADMIN — Medication 90 MILLIGRAM(S): at 05:40

## 2018-03-26 RX ADMIN — Medication 200 MILLIGRAM(S): at 05:40

## 2018-03-26 NOTE — PROGRESS NOTE ADULT - PROBLEM SELECTOR PLAN 1
-Pt had pulmonary edema and significant b/l pleural effusions IV lasix; now greatly improved s/p bumex gtt x3 days   -SCr slight uptrending today-decrease dose to 60 mg QD; patient maintaining euvolemic status   - Monitor I's and O's & daily weight  - Tolerates lying supine -Pt had pulmonary edema and significant b/l pleural effusions IV lasix; now greatly improved s/p bumex gtt x3 days   -SCr slight uptrending today-will titrate Lasix dose as necessary; patient maintaining euvolemic status   - Monitor I's and O's & daily weight  - Tolerates lying supine resolved  Pt had pulmonary edema and significant b/l pleural effusions IV lasix; now greatly improved s/p bumex gtt x3 days   -SCr slight uptrending today-will titrate Lasix dose as necessary; patient maintaining euvolemic status   - Monitor I's and O's & daily weight  - Tolerates lying supine

## 2018-03-26 NOTE — PROGRESS NOTE ADULT - PROBLEM SELECTOR PLAN 8
RSCI 3; patient is high risk for a moderate risk procedure    At present time and current condition, medically optimized for procedure.    Dalton Nunez D.O.  PGY-1 EM/IM  Pager #[phone]

## 2018-03-26 NOTE — PROGRESS NOTE ADULT - ATTENDING COMMENTS
Patient was seen and examined personally by me. I have discussed the plan and reviewed the resident's note and agree with the above physical exam findings including assessment and plan except as indicated below.    63 M PMH of DM, HTN, CKD, Rt BKA in 2009 who p/w sepsis 2/2 cellulitis and LLE SFA disease, c/b ATN, planned for Lt fem-pop bypass but canceled due to acute hypoxic respiratory failure 2/2 fluid overload, likely 2/2 acute diastolic CHF, now improved with diuresis.    No overnight events.  No CP, SOB, N/V/D.  Able to light flat without dyspnea on room air    Hypoxic respiratory failure/Acute Diastolic HF  likely secondary to volume overload now resolved and patient saturating well on room air  continue lasix- agree with switch to 60mg daily, CXR with improved pleural effusions  Creatinine slightly uptrending, no urgent need for HD  Patient was initially planned for Fem-pop bypass 3/27 but per vascular note, rescheduled till Thurs?   Discussed with Dr. Crump, vascular attending, plan is still for OR for tmw 3/27 at 5pm  Currently pt euvolemic with no further cardiac workup required prior to OR; c/w incentive spirometry  Hold diuretics day of surgery and hold short acting insulin day of procedure, patient not on long acting insulin  CXR and EKG personally reviewed

## 2018-03-26 NOTE — PROGRESS NOTE ADULT - PROBLEM SELECTOR PLAN 2
Pt with hypervolemia in the setting of CKD. Pt. appears clinically stable. Recommend to hold lasix today. Monitor weight daily and low salt diet Pt with hypervolemia in the setting of CKD. Pt. appears clinically stable. Continue with current dose of lasix. Monitor weight daily and low salt diet

## 2018-03-26 NOTE — PROGRESS NOTE ADULT - SUBJECTIVE AND OBJECTIVE BOX
Vascular Surgery Progress Note  C Team [id number]       SUBJECTIVE/interval events:  -tentatively on OR schedule for Thursday 3/29/18; will confirm  -w/out complaints      OBJECTIVE:     ** VITAL SIGNS / I&O's **    Vital Signs Last 24 Hrs  T(C): 36.5 (26 Mar 2018 13:01), Max: 36.9 (25 Mar 2018 21:16)  T(F): 97.7 (26 Mar 2018 13:01), Max: 98.5 (25 Mar 2018 21:16)  HR: 65 (26 Mar 2018 13:01) (62 - 68)  BP: 148/61 (26 Mar 2018 13:01) (118/77 - 150/70)  BP(mean): --  RR: 18 (26 Mar 2018 13:01) (17 - 18)  SpO2: 96% (26 Mar 2018 13:01) (94% - 96%)      Vital Signs Last 24 Hrs  T(C): 36.5 (26 Mar 2018 13:01), Max: 36.9 (25 Mar 2018 21:16)  T(F): 97.7 (26 Mar 2018 13:01), Max: 98.5 (25 Mar 2018 21:16)  HR: 65 (26 Mar 2018 13:01) (62 - 68)  BP: 148/61 (26 Mar 2018 13:01) (118/77 - 150/70)  BP(mean): --  RR: 18 (26 Mar 2018 13:01) (17 - 18)  SpO2: 96% (26 Mar 2018 13:01) (94% - 96%)        ** PHYSICAL EXAM **    Gen: Alert, NAD, off supplemental oxygen  Pulm: non-labored breathing, airway patent  Ext: LLE w/ kerlex dressing in place; s/p BKA        ** MEDICATIONS **    MEDICATIONS  (STANDING):  aspirin enteric coated 81 milliGRAM(s) Oral daily  atorvastatin 80 milliGRAM(s) Oral at bedtime  cyanocobalamin 1000 MICROGram(s) Oral daily  dextrose 5%. 1000 milliLiter(s) (50 mL/Hr) IV Continuous <Continuous>  dextrose 50% Injectable 25 Gram(s) IV Push once  dextrose 50% Injectable 25 Gram(s) IV Push once  furosemide    Tablet 40 milliGRAM(s) Oral two times a day  heparin  Injectable 5000 Unit(s) SubCutaneous every 8 hours  influenza   Vaccine 0.5 milliLiter(s) IntraMuscular once  insulin lispro (HumaLOG) corrective regimen sliding scale   SubCutaneous three times a day before meals  labetalol 200 milliGRAM(s) Oral three times a day  multivitamin 1 Tablet(s) Oral daily  NIFEdipine XL 90 milliGRAM(s) Oral daily  silver sulfADIAZINE 1% Cream 1 Application(s) Topical daily    MEDICATIONS  (PRN):  acetaminophen   Tablet. 650 milliGRAM(s) Oral every 6 hours PRN Moderate Pain (4 - 6)  benzocaine 15 mG/menthol 3.6 mG Lozenge 1 Lozenge Oral every 2 hours PRN Sore Throat  dextrose Gel 1 Dose(s) Oral once PRN Blood Glucose LESS THAN 70 milliGRAM(s)/deciliter  glucagon  Injectable 1 milliGRAM(s) IntraMuscular once PRN Glucose LESS THAN 70 milligrams/deciliter      ** LABS **                          11.0   5.69  )-----------( 289      ( 26 Mar 2018 05:45 )             34.9                         11.2   6.27  )-----------( 287      ( 23 Mar 2018 06:20 )             34.7                         11.4   5.48  )-----------( 281      ( 22 Mar 2018 06:50 )             35.4                         12.6   5.20  )-----------( 291      ( 21 Mar 2018 06:45 )             38.4                         10.9   4.66  )-----------( 231      ( 19 Mar 2018 06:21 )             35.3     03-26    143  |  101  |  90<H>  ----------------------------<  95  5.3   |  25  |  4.65<H>    Ca    9.1      26 Mar 2018 05:45  Phos  4.8     03-26  Mg     2.2     03-26 03-23    143  |  100  |  72<H>  ----------------------------<  79  4.6   |  28  |  4.41<H>    Ca    9.2      23 Mar 2018 06:20  Phos  5.2     03-23  Mg     1.9     03-23 03-22    142  |  97<L>  |  74<H>  ----------------------------<  92  4.6   |  30  |  4.66<H>    Ca    8.9      22 Mar 2018 06:50  Phos  6.0     03-22  Mg     1.9     03-22      19 Mar 2018 06:21    142    |  101    |  53     ----------------------------<  65     5.3     |  24     |  4.17     Ca    8.9        19 Mar 2018 06:21  Phos  4.8       19 Mar 2018 06:21  Mg     2.1       19 Mar 2018 06:21        Serum Pro-Brain Natriuretic Peptide (03.17.18 @ 06:45)    Serum Pro-Brain Natriuretic Peptide: 8301: ACUTE CONGESTIVE HEART FAILURE is UNLIKELY if NT-proBNP is < 300 pg/mL.    Hemoglobin A1C, Whole Blood (02.13.18 @ 07:11)    Hemoglobin A1C, Whole Blood: 5.9: High Risk (prediabetic)    5.7 - 6.4 %      CAPILLARY BLOOD GLUCOSE      POCT Blood Glucose.: 89 mg/dL (19 Mar 2018 08:15)  POCT Blood Glucose.: 104 mg/dL (18 Mar 2018 22:17)  POCT Blood Glucose.: 128 mg/dL (18 Mar 2018 17:26)  POCT Blood Glucose.: 106 mg/dL (18 Mar 2018 12:22)          ** IMAGING **    Xray Chest 2 Views PA/Lat (03.19.18 @ 18:02) >  IMPRESSION:  Moderate right and small left pleural effusions with likely   associated passive atelectasis. Underlying atelectasis of other cause   and/or pneumonia in the appropriate clinical context is not excluded. No   pneumonia was reported on the CT scan, however.      CT Chest No Cont (03.18.18 @ 09:50) >  LUNGS, PLEURA AND LARGE AIRWAYS: Large bilateral pleural effusions   resulting in near complete compressive atelectasis involving the right   middle and both lower lobes. Patent central airways.   VESSELS: Atherosclerotic changes of the aorta and coronary arteries.  HEART: Heart size is enlarged.No pericardial effusion. Mitral annular and   aortic valvular calcifications. MEDIASTINUM AND ROBERT: Calcified left   hilar lymph node is noted.  CHEST WALL AND LOWER NECK: Within normal limits.  VISUALIZED UPPER ABDOMEN: Within normal limits.  BONES: Within normal limits.    IMPRESSION:     Large bilateral pleural effusions resulting compressive atelectasis as   described above.      Transthoracic Echocardiogram (03.02.18 @ 11:03) >  CONCLUSIONS:  1. Mitral annular calcification, otherwise normal mitral  valve. Mild mitral regurgitation.  2. Aortic valve leaflet morphology not well visualized.  Peak transaortic valve gradient equals 19 mm Hg, mean  transaortic valve gradient equals 11 mm Hg, consistent with  probable mild aortic stenosis. Minimal aortic  regurgitation.  3. Normal left ventricular internal dimensions and wall  thicknesses.  4. Endocardium not well visualized; grossly normal left  ventricular systolic function.  5. The right ventricle is not well visualized; grossly  normal right ventricular systolic function.  6. Estimated right ventricular systolic pressure equals 57  mm Hg, assuming right atrial pressure equals 10 mm Hg,  consistent with moderate pulmonary hypertension.  7. Bilateral pleural effusions.        VA Duplex Ext Veins Lower Comp, Bilat. (03.09.18 @ 16:18) >  Summary/Impressions:  No evidence of thrombosis or significant venous wall  thickening noted in the visualized bilateral great  saphenous veins.  Vesseldiameters as noted above.       VA Duplex Ext Veins Lower Comp, Bilat. (03.09.18 @ 10:47) >  Summary/Impressions:  1.  No evidence of deep vein thrombosis in thevisualized  right and left lower extremities.  2.  No evidence of deep and superficial venous  insufficiency noted in the visualized right and left lower  extremities.        VA Duplex Physiol Ext Low 3+ Level, BI. (02.13.18 @ 14:12) >  Right Findings: History of right lower extremity  amputation.  Left Findings: The ankle-brachial index (KENN) on the left  is mildly decreased (0.71).  The toe-brachial index (TBI) on the left was not assessed  (overlying open wounds and  dressings).  Pulse volume recording (PVR) waveforms appear triphasic  with normal amplitudes at the left thigh, calf and ankle.  Waveforms are reduced in amplitude at the metatarsal and  digit segments.  These findings suggest the presence of  small vessel arterial disease.  ------------------------------------------------------------------------  Summary/Impressions:  Left lower extremity KENN is mildly decreased (0.71).  Left  TBI was not assessed.  Waveform analysis suggest the  presence of small vessel arterial disease.

## 2018-03-26 NOTE — PROGRESS NOTE ADULT - PROBLEM SELECTOR PLAN 1
Pt. with JANY on CKD in the setting of infection and diarrhea. CKD in the setting of long-standing DM. Scr was 1.5 in 3/2017, increased to 2.30 in 7/2017. Scr on admission (2/12) was elevated at 3.81, peaked to 6.3 on 2/23 and Scr at 4.65 today. Pt. also underwent vascular study/left leg angiogram on 3/8. Monitor BMP and urine output. Avoid any potential nephrotoxins Pt. with JANY on CKD in the setting of infection and diarrhea. CKD in the setting of long-standing DM. Scr was 1.5 in 3/2017, increased to 2.30 in 7/2017. Pt. also underwent vascular study/left leg angiogram on 3/8. Scr on admission (2/12) was elevated at 3.81, peaked to 6.3 on 2/23 and Scr stable at 4.65 today. Pt. non-oliguric. Monitor BMP and urine output. Avoid any potential nephrotoxins

## 2018-03-26 NOTE — PROGRESS NOTE ADULT - SUBJECTIVE AND OBJECTIVE BOX
Patient is a 63y old  Male who presents with a chief complaint of 63M PMH DM, HTN, CKD p/w L foot pain x 3 days. (19 Feb 2018 10:39)      SUBJECTIVE / OVERNIGHT EVENTS: No acute overnight events. Denies dyspnea; tolerating lying supine in bed. Voiding regularly     MEDICATIONS  (STANDING):  aspirin enteric coated 81 milliGRAM(s) Oral daily  atorvastatin 80 milliGRAM(s) Oral at bedtime  cyanocobalamin 1000 MICROGram(s) Oral daily  dextrose 5%. 1000 milliLiter(s) (50 mL/Hr) IV Continuous <Continuous>  dextrose 50% Injectable 25 Gram(s) IV Push once  dextrose 50% Injectable 25 Gram(s) IV Push once  furosemide    Tablet 40 milliGRAM(s) Oral two times a day  heparin  Injectable 5000 Unit(s) SubCutaneous every 8 hours  influenza   Vaccine 0.5 milliLiter(s) IntraMuscular once  insulin lispro (HumaLOG) corrective regimen sliding scale   SubCutaneous three times a day before meals  labetalol 200 milliGRAM(s) Oral three times a day  multivitamin 1 Tablet(s) Oral daily  NIFEdipine XL 90 milliGRAM(s) Oral daily  silver sulfADIAZINE 1% Cream 1 Application(s) Topical daily    MEDICATIONS  (PRN):  acetaminophen   Tablet. 650 milliGRAM(s) Oral every 6 hours PRN Moderate Pain (4 - 6)  benzocaine 15 mG/menthol 3.6 mG Lozenge 1 Lozenge Oral every 2 hours PRN Sore Throat  dextrose Gel 1 Dose(s) Oral once PRN Blood Glucose LESS THAN 70 milliGRAM(s)/deciliter  glucagon  Injectable 1 milliGRAM(s) IntraMuscular once PRN Glucose LESS THAN 70 milligrams/deciliter        CAPILLARY BLOOD GLUCOSE      POCT Blood Glucose.: 90 mg/dL (26 Mar 2018 08:27)  POCT Blood Glucose.: 110 mg/dL (25 Mar 2018 22:52)  POCT Blood Glucose.: 133 mg/dL (25 Mar 2018 16:57)  POCT Blood Glucose.: 136 mg/dL (25 Mar 2018 12:07)    I&O's Summary    25 Mar 2018 07:01  -  26 Mar 2018 07:00  --------------------------------------------------------  IN: 360 mL / OUT: 895 mL / NET: -535 mL        PHYSICAL EXAM:  GENERAL: no acute distress  HEAD:  Atraumatic, Normocephalic  EYES: EOMI, PERRLA, conjunctiva and sclera clear  NECK: Supple, No JVD  CHEST/LUNG: bibasilar rales R>L; No wheeze  HEART: Regular rate and rhythm; No murmurs, rubs, or gallops  ABDOMEN: Soft, Nontender, Nondistended; Bowel sounds present  EXTREMITIES:  2+ Peripheral Pulses, No edema s/p R BKA. LLE foot wound dressed; foul smell noted today.   PSYCH: AAOx3  NEUROLOGY: non-focal  SKIN: No rashes or lesions    LABS:  (03-26 @ 05:45)                        11.0  5.69 )-----------( 289                 34.9    Neutrophils = -- (--%)  Lymphocytes = -- (--%)  Eosinophils = -- (--%)  Basophils = -- (--%)  Monocytes = -- (--%)  Bands = --%    WBC Trend: 5.69<--, 5.67<--, 6.40<--  Hb Trend: 11.0<--, 11.4<--, 11.3<--, 11.2<--, 11.4<--  Plt Trend: 289<--, 268<--, 270<--, 287<--, 281<--  03-26    143  |  101  |  90<H>  ----------------------------<  95  5.3   |  25  |  4.65<H>    Ca    9.1      25 Mar 2018 06:55  Phos  4.8     03-26  Mg     2.1     03-25      Creatinine Trend: 4.65<--, 4.53<--, 4.19<--, 4.41<--, 4.66<--, 4.45<--  ( 26 Mar 2018 05:45 )   PT: 11.3 SEC;   INR: 1.02 ;       PTT:45.0 SEC    Consultant(s) Notes Reviewed:  Vascular Sx    Care Discussed with Consultants/Other Providers: Vascular Sx Patient is a 63y old  Male who presents with a chief complaint of 63M PMH DM, HTN, CKD p/w L foot pain x 3 days. (19 Feb 2018 10:39)      SUBJECTIVE / OVERNIGHT EVENTS: No acute overnight events. Denies dyspnea; tolerating lying supine in bed. Voiding regularly, output -900cc.     MEDICATIONS  (STANDING):  aspirin enteric coated 81 milliGRAM(s) Oral daily  atorvastatin 80 milliGRAM(s) Oral at bedtime  cyanocobalamin 1000 MICROGram(s) Oral daily  dextrose 5%. 1000 milliLiter(s) (50 mL/Hr) IV Continuous <Continuous>  dextrose 50% Injectable 25 Gram(s) IV Push once  dextrose 50% Injectable 25 Gram(s) IV Push once  furosemide    Tablet 40 milliGRAM(s) Oral two times a day  heparin  Injectable 5000 Unit(s) SubCutaneous every 8 hours  influenza   Vaccine 0.5 milliLiter(s) IntraMuscular once  insulin lispro (HumaLOG) corrective regimen sliding scale   SubCutaneous three times a day before meals  labetalol 200 milliGRAM(s) Oral three times a day  multivitamin 1 Tablet(s) Oral daily  NIFEdipine XL 90 milliGRAM(s) Oral daily  silver sulfADIAZINE 1% Cream 1 Application(s) Topical daily    MEDICATIONS  (PRN):  acetaminophen   Tablet. 650 milliGRAM(s) Oral every 6 hours PRN Moderate Pain (4 - 6)  benzocaine 15 mG/menthol 3.6 mG Lozenge 1 Lozenge Oral every 2 hours PRN Sore Throat  dextrose Gel 1 Dose(s) Oral once PRN Blood Glucose LESS THAN 70 milliGRAM(s)/deciliter  glucagon  Injectable 1 milliGRAM(s) IntraMuscular once PRN Glucose LESS THAN 70 milligrams/deciliter        CAPILLARY BLOOD GLUCOSE      POCT Blood Glucose.: 90 mg/dL (26 Mar 2018 08:27)  POCT Blood Glucose.: 110 mg/dL (25 Mar 2018 22:52)  POCT Blood Glucose.: 133 mg/dL (25 Mar 2018 16:57)  POCT Blood Glucose.: 136 mg/dL (25 Mar 2018 12:07)    I&O's Summary    25 Mar 2018 07:01  -  26 Mar 2018 07:00  --------------------------------------------------------  IN: 360 mL / OUT: 895 mL / NET: -535 mL        PHYSICAL EXAM:  GENERAL: no acute distress  HEAD:  Atraumatic, Normocephalic  EYES: EOMI, PERRLA, conjunctiva and sclera clear  NECK: Supple, No JVD  CHEST/LUNG: bibasilar rales R>L; No wheeze  HEART: Regular rate and rhythm; No murmurs, rubs, or gallops  ABDOMEN: Soft, Nontender, Nondistended; Bowel sounds present  EXTREMITIES:  2+ Peripheral Pulses, No edema s/p R BKA. LLE foot wound dressed; foul smell noted today.   PSYCH: AAOx3  NEUROLOGY: non-focal  SKIN: No rashes or lesions    LABS:  (03-26 @ 05:45)                        11.0  5.69 )-----------( 289                 34.9    Neutrophils = -- (--%)  Lymphocytes = -- (--%)  Eosinophils = -- (--%)  Basophils = -- (--%)  Monocytes = -- (--%)  Bands = --%    WBC Trend: 5.69<--, 5.67<--, 6.40<--  Hb Trend: 11.0<--, 11.4<--, 11.3<--, 11.2<--, 11.4<--  Plt Trend: 289<--, 268<--, 270<--, 287<--, 281<--  03-26    143  |  101  |  90<H>  ----------------------------<  95  5.3   |  25  |  4.65<H>    Ca    9.1      25 Mar 2018 06:55  Phos  4.8     03-26  Mg     2.1     03-25      Creatinine Trend: 4.65<--, 4.53<--, 4.19<--, 4.41<--, 4.66<--, 4.45<--  ( 26 Mar 2018 05:45 )   PT: 11.3 SEC;   INR: 1.02 ;       PTT:45.0 SEC    RADIOLOGY:    CXR: blunted costophrenic angles likely small effusions otherwise clear lungs.     Consultant(s) Notes Reviewed:  Vascular Sx    Care Discussed with Consultants/Other Providers: Vascular Sx Patient is a 63y old  Male who presents with a chief complaint of 63M PMH DM, HTN, CKD p/w L foot pain x 3 days. (19 Feb 2018 10:39)      SUBJECTIVE / OVERNIGHT EVENTS: No acute overnight events. Denies dyspnea; tolerating lying supine in bed. Voiding regularly, output -900cc.     MEDICATIONS  (STANDING):  aspirin enteric coated 81 milliGRAM(s) Oral daily  atorvastatin 80 milliGRAM(s) Oral at bedtime  cyanocobalamin 1000 MICROGram(s) Oral daily  dextrose 5%. 1000 milliLiter(s) (50 mL/Hr) IV Continuous <Continuous>  dextrose 50% Injectable 25 Gram(s) IV Push once  dextrose 50% Injectable 25 Gram(s) IV Push once  furosemide    Tablet 40 milliGRAM(s) Oral two times a day  heparin  Injectable 5000 Unit(s) SubCutaneous every 8 hours  influenza   Vaccine 0.5 milliLiter(s) IntraMuscular once  insulin lispro (HumaLOG) corrective regimen sliding scale   SubCutaneous three times a day before meals  labetalol 200 milliGRAM(s) Oral three times a day  multivitamin 1 Tablet(s) Oral daily  NIFEdipine XL 90 milliGRAM(s) Oral daily  silver sulfADIAZINE 1% Cream 1 Application(s) Topical daily    MEDICATIONS  (PRN):  acetaminophen   Tablet. 650 milliGRAM(s) Oral every 6 hours PRN Moderate Pain (4 - 6)  benzocaine 15 mG/menthol 3.6 mG Lozenge 1 Lozenge Oral every 2 hours PRN Sore Throat  dextrose Gel 1 Dose(s) Oral once PRN Blood Glucose LESS THAN 70 milliGRAM(s)/deciliter  glucagon  Injectable 1 milliGRAM(s) IntraMuscular once PRN Glucose LESS THAN 70 milligrams/deciliter        CAPILLARY BLOOD GLUCOSE      POCT Blood Glucose.: 90 mg/dL (26 Mar 2018 08:27)  POCT Blood Glucose.: 110 mg/dL (25 Mar 2018 22:52)  POCT Blood Glucose.: 133 mg/dL (25 Mar 2018 16:57)  POCT Blood Glucose.: 136 mg/dL (25 Mar 2018 12:07)    I&O's Summary    25 Mar 2018 07:01  -  26 Mar 2018 07:00  --------------------------------------------------------  IN: 360 mL / OUT: 895 mL / NET: -535 mL        PHYSICAL EXAM:  GENERAL: no acute distress  HEAD:  Atraumatic, Normocephalic  EYES: EOMI, PERRLA, conjunctiva and sclera clear  NECK: Supple, No JVD  CHEST/LUNG: bibasilar rales R>L; No wheeze  HEART: Regular rate and rhythm; No murmurs, rubs, or gallops  ABDOMEN: Soft, Nontender, Nondistended; Bowel sounds present  EXTREMITIES:  2+ Peripheral Pulses, No edema s/p R BKA. LLE foot wound dressed; foul smell noted today. Left foot wound with some drainage today otherwise non-purulent.   PSYCH: AAOx3  NEUROLOGY: non-focal  SKIN: No rashes or lesions    LABS:  (03-26 @ 05:45)                        11.0  5.69 )-----------( 289                 34.9    Neutrophils = -- (--%)  Lymphocytes = -- (--%)  Eosinophils = -- (--%)  Basophils = -- (--%)  Monocytes = -- (--%)  Bands = --%    WBC Trend: 5.69<--, 5.67<--, 6.40<--  Hb Trend: 11.0<--, 11.4<--, 11.3<--, 11.2<--, 11.4<--  Plt Trend: 289<--, 268<--, 270<--, 287<--, 281<--  03-26    143  |  101  |  90<H>  ----------------------------<  95  5.3   |  25  |  4.65<H>    Ca    9.1      25 Mar 2018 06:55  Phos  4.8     03-26  Mg     2.1     03-25      Creatinine Trend: 4.65<--, 4.53<--, 4.19<--, 4.41<--, 4.66<--, 4.45<--  ( 26 Mar 2018 05:45 )   PT: 11.3 SEC;   INR: 1.02 ;       PTT:45.0 SEC    RADIOLOGY:    CXR: blunted costophrenic angles likely small effusions otherwise clear lungs.     Consultant(s) Notes Reviewed:  Vascular Sx    Care Discussed with Consultants/Other Providers: Vascular Sx Patient is a 63y old  Male who presents with a chief complaint of 63M PMH DM, HTN, CKD p/w L foot pain x 3 days. (19 Feb 2018 10:39)      SUBJECTIVE / OVERNIGHT EVENTS: No acute overnight events. Denies dyspnea; tolerating lying supine in bed. Voiding regularly, output -900cc.     MEDICATIONS  (STANDING):  aspirin enteric coated 81 milliGRAM(s) Oral daily  atorvastatin 80 milliGRAM(s) Oral at bedtime  cyanocobalamin 1000 MICROGram(s) Oral daily  dextrose 5%. 1000 milliLiter(s) (50 mL/Hr) IV Continuous <Continuous>  dextrose 50% Injectable 25 Gram(s) IV Push once  dextrose 50% Injectable 25 Gram(s) IV Push once  furosemide    Tablet 40 milliGRAM(s) Oral two times a day  heparin  Injectable 5000 Unit(s) SubCutaneous every 8 hours  influenza   Vaccine 0.5 milliLiter(s) IntraMuscular once  insulin lispro (HumaLOG) corrective regimen sliding scale   SubCutaneous three times a day before meals  labetalol 200 milliGRAM(s) Oral three times a day  multivitamin 1 Tablet(s) Oral daily  NIFEdipine XL 90 milliGRAM(s) Oral daily  silver sulfADIAZINE 1% Cream 1 Application(s) Topical daily    MEDICATIONS  (PRN):  acetaminophen   Tablet. 650 milliGRAM(s) Oral every 6 hours PRN Moderate Pain (4 - 6)  benzocaine 15 mG/menthol 3.6 mG Lozenge 1 Lozenge Oral every 2 hours PRN Sore Throat  dextrose Gel 1 Dose(s) Oral once PRN Blood Glucose LESS THAN 70 milliGRAM(s)/deciliter  glucagon  Injectable 1 milliGRAM(s) IntraMuscular once PRN Glucose LESS THAN 70 milligrams/deciliter    CAPILLARY BLOOD GLUCOSE      POCT Blood Glucose.: 90 mg/dL (26 Mar 2018 08:27)  POCT Blood Glucose.: 110 mg/dL (25 Mar 2018 22:52)  POCT Blood Glucose.: 133 mg/dL (25 Mar 2018 16:57)  POCT Blood Glucose.: 136 mg/dL (25 Mar 2018 12:07)    I&O's Summary    25 Mar 2018 07:01  -  26 Mar 2018 07:00  --------------------------------------------------------  IN: 360 mL / OUT: 895 mL / NET: -535 mL        PHYSICAL EXAM:  GENERAL: no acute distress  EYES: sclera clear  NECK: Supple, No JVD  CHEST/LUNG: mild rales R>L; No wheeze  HEART: Regular rate and rhythm; No murmurs, rubs, or gallops  ABDOMEN: Soft, Nontender, Nondistended; Bowel sounds present  EXTREMITIES:  No edema s/p R BKA. LLE foot wound dressed; foul smell noted today. Left foot wound with some drainage today otherwise non-purulent. Unable to palpate dorsalis pedis pulse on left lower ext  PSYCH: AAOx3  NEUROLOGY: non-focal  SKIN: No rashes or lesions    LABS:  (03-26 @ 05:45)                        11.0  5.69 )-----------( 289                 34.9    Neutrophils = -- (--%)  Lymphocytes = -- (--%)  Eosinophils = -- (--%)  Basophils = -- (--%)  Monocytes = -- (--%)  Bands = --%    WBC Trend: 5.69<--, 5.67<--, 6.40<--  Hb Trend: 11.0<--, 11.4<--, 11.3<--, 11.2<--, 11.4<--  Plt Trend: 289<--, 268<--, 270<--, 287<--, 281<--  03-26    143  |  101  |  90<H>  ----------------------------<  95  5.3   |  25  |  4.65<H>    Ca    9.1      25 Mar 2018 06:55  Phos  4.8     03-26  Mg     2.1     03-25      Creatinine Trend: 4.65<--, 4.53<--, 4.19<--, 4.41<--, 4.66<--, 4.45<--  ( 26 Mar 2018 05:45 )   PT: 11.3 SEC;   INR: 1.02 ;       PTT:45.0 SEC    RADIOLOGY:    CXR: blunted costophrenic angles likely small effusions otherwise clear lungs.     Consultant(s) Notes Reviewed:  Vascular Sx    Care Discussed with Consultants/Other Providers: Vascular Sx Patient is a 63y old  Male who presents with a chief complaint of 63M PMH DM, HTN, CKD p/w L foot pain x 3 days. (19 Feb 2018 10:39)      SUBJECTIVE / OVERNIGHT EVENTS: No acute overnight events. Denies dyspnea; tolerating lying supine in bed. Voiding regularly, output -900cc.     MEDICATIONS  (STANDING):  aspirin enteric coated 81 milliGRAM(s) Oral daily  atorvastatin 80 milliGRAM(s) Oral at bedtime  cyanocobalamin 1000 MICROGram(s) Oral daily  dextrose 5%. 1000 milliLiter(s) (50 mL/Hr) IV Continuous <Continuous>  dextrose 50% Injectable 25 Gram(s) IV Push once  dextrose 50% Injectable 25 Gram(s) IV Push once  furosemide    Tablet 40 milliGRAM(s) Oral two times a day  heparin  Injectable 5000 Unit(s) SubCutaneous every 8 hours  influenza   Vaccine 0.5 milliLiter(s) IntraMuscular once  insulin lispro (HumaLOG) corrective regimen sliding scale   SubCutaneous three times a day before meals  labetalol 200 milliGRAM(s) Oral three times a day  multivitamin 1 Tablet(s) Oral daily  NIFEdipine XL 90 milliGRAM(s) Oral daily  silver sulfADIAZINE 1% Cream 1 Application(s) Topical daily    MEDICATIONS  (PRN):  acetaminophen   Tablet. 650 milliGRAM(s) Oral every 6 hours PRN Moderate Pain (4 - 6)  benzocaine 15 mG/menthol 3.6 mG Lozenge 1 Lozenge Oral every 2 hours PRN Sore Throat  dextrose Gel 1 Dose(s) Oral once PRN Blood Glucose LESS THAN 70 milliGRAM(s)/deciliter  glucagon  Injectable 1 milliGRAM(s) IntraMuscular once PRN Glucose LESS THAN 70 milligrams/deciliter    CAPILLARY BLOOD GLUCOSE      POCT Blood Glucose.: 90 mg/dL (26 Mar 2018 08:27)  POCT Blood Glucose.: 110 mg/dL (25 Mar 2018 22:52)  POCT Blood Glucose.: 133 mg/dL (25 Mar 2018 16:57)  POCT Blood Glucose.: 136 mg/dL (25 Mar 2018 12:07)    I&O's Summary    25 Mar 2018 07:01  -  26 Mar 2018 07:00  --------------------------------------------------------  IN: 360 mL / OUT: 895 mL / NET: -535 mL        PHYSICAL EXAM:  GENERAL: no acute distress  EYES: sclera clear  NECK: Supple, No JVD  CHEST/LUNG: mild rales R>L; No wheeze  HEART: Regular rate and rhythm; No murmurs, rubs, or gallops  ABDOMEN: Soft, Nontender, Nondistended; Bowel sounds present  EXTREMITIES:  No edema s/p R BKA. LLE foot wound dressed; foul smell noted today. Left foot wound with some serous drainage today otherwise non-purulent, nontender to palpation. Unable to palpate dorsalis pedis pulse on left lower ext  PSYCH: AAOx3  NEUROLOGY: non-focal  SKIN: dark lesions with superficial ulcer on bottom of left foot, nontender, + granulomatous tissue surrounding pink skin    LABS:  (03-26 @ 05:45)                        11.0  5.69 )-----------( 289                 34.9    Neutrophils = -- (--%)  Lymphocytes = -- (--%)  Eosinophils = -- (--%)  Basophils = -- (--%)  Monocytes = -- (--%)  Bands = --%    WBC Trend: 5.69<--, 5.67<--, 6.40<--  Hb Trend: 11.0<--, 11.4<--, 11.3<--, 11.2<--, 11.4<--  Plt Trend: 289<--, 268<--, 270<--, 287<--, 281<--  03-26    143  |  101  |  90<H>  ----------------------------<  95  5.3   |  25  |  4.65<H>    Ca    9.1      25 Mar 2018 06:55  Phos  4.8     03-26  Mg     2.1     03-25      Creatinine Trend: 4.65<--, 4.53<--, 4.19<--, 4.41<--, 4.66<--, 4.45<--  ( 26 Mar 2018 05:45 )   PT: 11.3 SEC;   INR: 1.02 ;       PTT:45.0 SEC    RADIOLOGY:    CXR: blunted costophrenic angles likely small effusions otherwise clear lungs.     Consultant(s) Notes Reviewed:  Vascular Sx    Care Discussed with Consultants/Other Providers: Vascular Sx

## 2018-03-26 NOTE — PROGRESS NOTE ADULT - SUBJECTIVE AND OBJECTIVE BOX
[facility] DIVISION OF KIDNEY DISEASES AND HYPERTENSION -- FOLLOW UP NOTE  --------------------------------------------------------------------------------    HPI: 63-year-old male with PMH of HTN, DM, right BKA, and CKD admitted for left foot infection. As per review of labs on [place]/[employer], Scr was 1.51 in 3/2017. As per PMD's office, Scr checked in 7/2017 was 2.30. Labs on admission (2/12) showed elevated Scr of 3.8 which peaked to 6.3 on 2/23.. Pt. underwent vascular study/left leg angiogram on 3/8. Patient seen and examined today. Pt experienced SOB which had resolved with initiation of IV diuretic therapy. Pt switched to oral diuretic therapy 3/22. Comfortable off nasal cannula at bedside. Pt laying flat without any complaints. Pt. denies SOB, CP or N/V.     PAST HISTORY  --------------------------------------------------------------------------------  No significant changes to PMH, PSH, FHx, SHx, unless otherwise noted    ALLERGIES & MEDICATIONS  --------------------------------------------------------------------------------  Allergies    No Known Allergies    Intolerances      Standing Inpatient Medications  aspirin enteric coated 81 milliGRAM(s) Oral daily  atorvastatin 80 milliGRAM(s) Oral at bedtime  cyanocobalamin 1000 MICROGram(s) Oral daily  dextrose 5%. 1000 milliLiter(s) IV Continuous <Continuous>  dextrose 50% Injectable 25 Gram(s) IV Push once  dextrose 50% Injectable 25 Gram(s) IV Push once  furosemide    Tablet 40 milliGRAM(s) Oral two times a day  heparin  Injectable 5000 Unit(s) SubCutaneous every 8 hours  influenza   Vaccine 0.5 milliLiter(s) IntraMuscular once  insulin lispro (HumaLOG) corrective regimen sliding scale   SubCutaneous three times a day before meals  labetalol 200 milliGRAM(s) Oral three times a day  multivitamin 1 Tablet(s) Oral daily  NIFEdipine XL 90 milliGRAM(s) Oral daily  silver sulfADIAZINE 1% Cream 1 Application(s) Topical daily    PRN Inpatient Medications  acetaminophen   Tablet. 650 milliGRAM(s) Oral every 6 hours PRN  benzocaine 15 mG/menthol 3.6 mG Lozenge 1 Lozenge Oral every 2 hours PRN  dextrose Gel 1 Dose(s) Oral once PRN  glucagon  Injectable 1 milliGRAM(s) IntraMuscular once PRN      REVIEW OF SYSTEMS  --------------------------------------------------------------------------------  General: no fever  CVS: no chest pain  RESP: no sob, no cough  ABD: no abdominal pain  : no dysuria,  MSK: no edema     All other systems were reviewed and are negative, except as noted.    VITALS/PHYSICAL EXAM  --------------------------------------------------------------------------------  T(C): 36.5 (03-26-18 @ 13:01), Max: 36.9 (03-25-18 @ 21:16)  HR: 65 (03-26-18 @ 13:01) (62 - 68)  BP: 148/61 (03-26-18 @ 13:01) (118/77 - 150/70)  RR: 18 (03-26-18 @ 13:01) (17 - 18)  SpO2: 96% (03-26-18 @ 13:01) (94% - 96%)  Wt(kg): --      03-25-18 @ 07:01  -  03-26-18 @ 07:00  --------------------------------------------------------  IN: 360 mL / OUT: 895 mL / NET: -535 mL    Physical Exam:              Gen: NAD  	HEENT: MMM  	Pulm: decreased breath sounds  B/L  	CV: S1S2  	Abd: Soft, +BS  	Ext: no edema + R BKA              Neuro: Awake, alert   	Skin: Warm and Dry     LABS/STUDIES  --------------------------------------------------------------------------------              11.0   5.69  >-----------<  289      [03-26-18 @ 05:45]              34.9     143  |  101  |  90  ----------------------------<  95      [03-26-18 @ 05:45]  5.3   |  25  |  4.65        Ca     9.1     [03-26-18 @ 05:45]      Mg     2.2     [03-26-18 @ 05:45]      Phos  4.8     [03-26-18 @ 05:45]    PT/INR: PT 11.3 , INR 1.02       [03-26-18 @ 05:45]  PTT: 45.0       [03-26-18 @ 05:45]    Creatinine Trend:  SCr 4.65 [03-26 @ 05:45]  SCr 4.53 [03-25 @ 06:55]  SCr 4.19 [03-24 @ 06:50]  SCr 4.41 [03-23 @ 06:20]  SCr 4.66 [03-22 @ 06:50]    Urinalysis - [02-25-18 @ 04:50]      Color PLYEL / Appearance CLEAR / SG 1.015 / pH 5.5      Gluc NEGATIVE / Ketone NEGATIVE  / Bili NEGATIVE / Urobili NORMAL       Blood TRACE / Protein 100 / Leuk Est TRACE / Nitrite NEGATIVE      RBC 0-2 / WBC 2-5 / Hyaline  / Gran  / Sq Epi OCC / Non Sq Epi  / Bacteria     Iron 21, TIBC 163, %sat --      [02-20-18 @ 05:00]  Ferritin 540.7      [02-20-18 @ 05:00]  HbA1c 5.9      [02-13-18 @ 07:11]  TSH 3.22      [03-08-18 @ 06:00]    Free Light Chains: kappa 20.50, lambda 11.20, ratio = 1.83 H      [02-25 @ 07:43]

## 2018-03-26 NOTE — PROGRESS NOTE ADULT - ASSESSMENT
63M PMHx DM, s/p R BKA, now presenting with Diabetic foot soft tissue infection, w/ angiography showing arterial stenosis of LLE, plan for LLE bypass. Bypass cancelled (3/15) due to dyspnea while supine 2/2 pulmonary effusions. Pulmonary, Nephrology, Medicine, & Cardiology are consulted for optimization of patient. Undergoing aggressive diuresis & fluid management.     -Plan for bypass next week; tentatively Thursday  3/29/18    #Medically optimized: "pt now medically optimized for procedure." (3/22/18 Progress Note)  #Cardiac optimized: "1. Appears euvolemic; 2. No cardiac contraindications to surgery." (3/22/18 Progress Note)    - please f/u on Anesthesia recommendations (Incentive spirometry & repeat CXR/labs) (3/22/18 Chart note)  - please document Medicine, Pulmonary & Cardiac risk stratification for planned bypass procedure  - Strict I&O  - Wound care as per podiatry      Pre-operatively please ensure:  -NPO@[facility] on Wednesday 3/28  -document medical and cardiac optimization, as well as risk stratification  -send Pre-op labs the evening (Wed) prior to procedure: CBC, BMP/Mg/Phos, PT/PTT/INR, T&S  -ensure 12-lead EKG in chart (w/in 48hr)  -CXR in PACS (w/in 48hr)  -adjust insulin regimen to reflect NPO status    Consent in Chart.       KAREN Reynolds MD (PGY2)    (Please page C team [phone] for questions/concerns.)

## 2018-03-27 LAB
BUN SERPL-MCNC: 90 MG/DL — HIGH (ref 7–23)
CALCIUM SERPL-MCNC: 9.5 MG/DL — SIGNIFICANT CHANGE UP (ref 8.4–10.5)
CHLORIDE SERPL-SCNC: 101 MMOL/L — SIGNIFICANT CHANGE UP (ref 98–107)
CO2 SERPL-SCNC: 24 MMOL/L — SIGNIFICANT CHANGE UP (ref 22–31)
CREAT SERPL-MCNC: 4.4 MG/DL — HIGH (ref 0.5–1.3)
GLUCOSE SERPL-MCNC: 92 MG/DL — SIGNIFICANT CHANGE UP (ref 70–99)
HCT VFR BLD CALC: 35.9 % — LOW (ref 39–50)
HGB BLD-MCNC: 11.3 G/DL — LOW (ref 13–17)
MAGNESIUM SERPL-MCNC: 2.3 MG/DL — SIGNIFICANT CHANGE UP (ref 1.6–2.6)
MCHC RBC-ENTMCNC: 26.5 PG — LOW (ref 27–34)
MCHC RBC-ENTMCNC: 31.5 % — LOW (ref 32–36)
MCV RBC AUTO: 84.3 FL — SIGNIFICANT CHANGE UP (ref 80–100)
NRBC # FLD: 0 — SIGNIFICANT CHANGE UP
PHOSPHATE SERPL-MCNC: 4.8 MG/DL — HIGH (ref 2.5–4.5)
PLATELET # BLD AUTO: 268 K/UL — SIGNIFICANT CHANGE UP (ref 150–400)
PMV BLD: 10.9 FL — SIGNIFICANT CHANGE UP (ref 7–13)
POTASSIUM SERPL-MCNC: 4.9 MMOL/L — SIGNIFICANT CHANGE UP (ref 3.5–5.3)
POTASSIUM SERPL-SCNC: 4.9 MMOL/L — SIGNIFICANT CHANGE UP (ref 3.5–5.3)
RBC # BLD: 4.26 M/UL — SIGNIFICANT CHANGE UP (ref 4.2–5.8)
RBC # FLD: 14.6 % — HIGH (ref 10.3–14.5)
SODIUM SERPL-SCNC: 141 MMOL/L — SIGNIFICANT CHANGE UP (ref 135–145)
WBC # BLD: 5.9 K/UL — SIGNIFICANT CHANGE UP (ref 3.8–10.5)
WBC # FLD AUTO: 5.9 K/UL — SIGNIFICANT CHANGE UP (ref 3.8–10.5)

## 2018-03-27 PROCEDURE — 99232 SBSQ HOSP IP/OBS MODERATE 35: CPT | Mod: GC

## 2018-03-27 PROCEDURE — 99232 SBSQ HOSP IP/OBS MODERATE 35: CPT

## 2018-03-27 PROCEDURE — 99233 SBSQ HOSP IP/OBS HIGH 50: CPT | Mod: GC

## 2018-03-27 RX ORDER — ASPIRIN/CALCIUM CARB/MAGNESIUM 324 MG
81 TABLET ORAL DAILY
Refills: 0 | Status: DISCONTINUED | OUTPATIENT
Start: 2018-03-27 | End: 2018-04-13

## 2018-03-27 RX ORDER — HEPARIN SODIUM 5000 [USP'U]/ML
5000 INJECTION INTRAVENOUS; SUBCUTANEOUS EVERY 8 HOURS
Refills: 0 | Status: DISCONTINUED | OUTPATIENT
Start: 2018-03-27 | End: 2018-04-13

## 2018-03-27 RX ADMIN — HEPARIN SODIUM 5000 UNIT(S): 5000 INJECTION INTRAVENOUS; SUBCUTANEOUS at 21:18

## 2018-03-27 RX ADMIN — PREGABALIN 1000 MICROGRAM(S): 225 CAPSULE ORAL at 12:30

## 2018-03-27 RX ADMIN — Medication 2: at 17:24

## 2018-03-27 RX ADMIN — Medication 1 APPLICATION(S): at 12:30

## 2018-03-27 RX ADMIN — Medication 200 MILLIGRAM(S): at 21:18

## 2018-03-27 RX ADMIN — Medication 81 MILLIGRAM(S): at 12:30

## 2018-03-27 RX ADMIN — ATORVASTATIN CALCIUM 80 MILLIGRAM(S): 80 TABLET, FILM COATED ORAL at 21:18

## 2018-03-27 RX ADMIN — Medication 90 MILLIGRAM(S): at 06:12

## 2018-03-27 RX ADMIN — Medication 200 MILLIGRAM(S): at 15:17

## 2018-03-27 RX ADMIN — Medication 1 TABLET(S): at 12:30

## 2018-03-27 RX ADMIN — Medication 200 MILLIGRAM(S): at 06:12

## 2018-03-27 NOTE — PROGRESS NOTE ADULT - PROBLEM SELECTOR PLAN 4
-s/p angiogram with multi vessel disease s/p LE dopplers & vein mapping for Femoral-popliteal bypass. KENN .71 on left.   -c/w ASA and statin; Plan for OR today for Fem-Pop Bypass. -s/p angiogram with multi vessel disease s/p LE dopplers & vein mapping for Femoral-popliteal bypass. KENN .71 on left.   -c/w ASA and statin; Plan for OR on 03/29 for Fem-Pop Bypass.

## 2018-03-27 NOTE — PROGRESS NOTE ADULT - PROBLEM SELECTOR PLAN 7
-DVT ppx: HSQ  -Diabetic Diet    Disposition: Plan for Surgery tomorrow; ultimate dispo to [facility] -DVT ppx: HSQ  -Diabetic Diet    Disposition: ultimate dispo to [facility]

## 2018-03-27 NOTE — PROGRESS NOTE ADULT - PROBLEM SELECTOR PLAN 2
Pt with hypervolemia in the setting of CKD. Pt. appears clinically stable. Continue with current dose of oral diuretic therapy. Monitor weight daily and low salt diet

## 2018-03-27 NOTE — PROGRESS NOTE ADULT - ASSESSMENT
63M PMHx DM, s/p R BKA, now presenting with Diabetic foot soft tissue infection, w/ angiography showing arterial stenosis of LLE, plan for LLE bypass. Bypass cancelled (3/15) due to dyspnea while supine 2/2 pulmonary effusions. Pulmonary, Nephrology, Medicine, & Cardiology are consulted for optimization of patient. Undergoing aggressive diuresis & fluid management.     -Plan for bypass next week; tentatively Thursday  3/29/18    #Medically optimized: "pt now medically optimized for procedure." (3/22/18 Progress Note)  #Cardiac optimized: "1. Appears euvolemic; 2. No cardiac contraindications to surgery." (3/22/18 Progress Note)    - please f/u on Anesthesia recommendations (Incentive spirometry & repeat CXR/labs) (3/22/18 Chart note)  - please document Medicine, Pulmonary & Cardiac risk stratification for planned bypass procedure  - Strict I&O  - Wound care as per podiatry      Pre-operatively please ensure:  -NPO@Mdn on Wednesday 3/28  -document medical and cardiac optimization, as well as risk stratification  -send Pre-op labs the evening (Wed) prior to procedure: CBC, BMP/Mg/Phos, PT/PTT/INR, T&S  -ensure 12-lead EKG in chart (w/in 48hr)  -CXR in PACS (w/in 48hr)  -adjust insulin regimen to reflect NPO status    Consent in Chart.       KAREN Reynolds MD (PGY2)    (Please page C team [phone] for questions/concerns.)

## 2018-03-27 NOTE — PROGRESS NOTE ADULT - PROBLEM SELECTOR PLAN 8
RSCI 3; patient is high risk for a moderate risk procedure    At present time and current condition, medically optimized for procedure.    Dalton Nunez D.O.  PGY-1 EM/IM  Pager #[phone]

## 2018-03-27 NOTE — PROGRESS NOTE ADULT - PROBLEM SELECTOR PLAN 1
Pt. with JANY on CKD in the setting of infection and diarrhea. CKD in the setting of long-standing DM. Scr was 1.5 in 3/2017, increased to 2.30 in 7/2017. Pt. also underwent vascular study/left leg angiogram on 3/8. Scr on admission (2/12) was elevated at 3.81, peaked to 6.3 on 2/23 and Scr stable at 4.4 today. Pt. non-oliguric. Monitor BMP and urine output. Avoid any potential nephrotoxins

## 2018-03-27 NOTE — PROGRESS NOTE ADULT - PROBLEM SELECTOR PLAN 1
resolved  Pt had pulmonary edema and significant b/l pleural effusions IV lasix; now greatly improved s/p bumex gtt x3 days   - SCr stable;  patient maintaining euvolemic status.  - c/w PO lasix (hold today in anticipation for high radiocontrast load)  - Monitor I's and O's & daily weight  - Tolerates lying supine resolved  Pt had pulmonary edema and significant b/l pleural effusions IV lasix; now greatly improved s/p bumex gtt x3 days   - SCr stable;  patient maintaining euvolemic status.  - c/w PO lasix  - Monitor I's and O's & daily weight  - Tolerates lying supine

## 2018-03-27 NOTE — PROGRESS NOTE ADULT - SUBJECTIVE AND OBJECTIVE BOX
[facility] DIVISION OF KIDNEY DISEASES AND HYPERTENSION -- FOLLOW UP NOTE  --------------------------------------------------------------------------------    HPI: 63-year-old male with PMH of HTN, DM, right BKA, and CKD admitted for left foot infection. As per review of labs on [place]/[employer], Scr was 1.51 in 3/2017. As per PMD's office, Scr checked in 7/2017 was 2.30. Labs on admission (2/12) showed elevated Scr of 3.8 which peaked to 6.3 on 2/23.. Pt. underwent vascular study/left leg angiogram on 3/8. Patient seen and examined today. Pt experienced SOB which had resolved with initiation of IV diuretic therapy. Pt switched to oral diuretic therapy 3/22. Comfortable off nasal cannula at bedside. Pt. denies SOB, CP or N/V.     PAST HISTORY  --------------------------------------------------------------------------------  No significant changes to PMH, PSH, FHx, SHx, unless otherwise noted    ALLERGIES & MEDICATIONS  --------------------------------------------------------------------------------  Allergies    No Known Allergies    Intolerances      Standing Inpatient Medications  aspirin enteric coated 81 milliGRAM(s) Oral daily  atorvastatin 80 milliGRAM(s) Oral at bedtime  cyanocobalamin 1000 MICROGram(s) Oral daily  dextrose 5%. 1000 milliLiter(s) IV Continuous <Continuous>  dextrose 50% Injectable 25 Gram(s) IV Push once  dextrose 50% Injectable 25 Gram(s) IV Push once  furosemide    Tablet 60 milliGRAM(s) Oral daily  heparin  Injectable 5000 Unit(s) SubCutaneous every 8 hours  influenza   Vaccine 0.5 milliLiter(s) IntraMuscular once  insulin lispro (HumaLOG) corrective regimen sliding scale   SubCutaneous three times a day before meals  labetalol 200 milliGRAM(s) Oral three times a day  multivitamin 1 Tablet(s) Oral daily  NIFEdipine XL 90 milliGRAM(s) Oral daily  silver sulfADIAZINE 1% Cream 1 Application(s) Topical daily    PRN Inpatient Medications  acetaminophen   Tablet. 650 milliGRAM(s) Oral every 6 hours PRN  benzocaine 15 mG/menthol 3.6 mG Lozenge 1 Lozenge Oral every 2 hours PRN  dextrose Gel 1 Dose(s) Oral once PRN  glucagon  Injectable 1 milliGRAM(s) IntraMuscular once PRN      REVIEW OF SYSTEMS  --------------------------------------------------------------------------------  General: no fever  CVS: no chest pain  RESP: no sob, no cough  ABD: no abdominal pain  : no dysuria,  MSK: no edema     All other systems were reviewed and are negative, except as noted.    VITALS/PHYSICAL EXAM  --------------------------------------------------------------------------------  T(C): 37.5 (03-27-18 @ 04:49), Max: 37.5 (03-27-18 @ 04:49)  HR: 66 (03-27-18 @ 04:49) (64 - 66)  BP: 145/51 (03-27-18 @ 04:49) (145/51 - 150/65)  RR: 18 (03-27-18 @ 04:49) (18 - 18)  SpO2: 100% (03-27-18 @ 04:49) (96% - 100%)  Wt(kg): --    03-26-18 @ 07:01  -  03-27-18 @ 07:00  --------------------------------------------------------  IN: 320 mL / OUT: 850 mL / NET: -530 mL    Physical Exam:              Gen: NAD  	HEENT: MMM  	Pulm: decreased breath sounds  B/L  	CV: S1S2  	Abd: Soft, +BS  	Ext: no edema + R BKA              Neuro: Awake, alert   	Skin: Warm and Dry    LABS/STUDIES  --------------------------------------------------------------------------------              11.3   5.90  >-----------<  268      [03-27-18 @ 06:10]              35.9     141  |  101  |  90  ----------------------------<  92      [03-27-18 @ 06:10]  4.9   |  24  |  4.40        Ca     9.5     [03-27-18 @ 06:10]      Mg     2.3     [03-27-18 @ 06:10]      Phos  4.8     [03-27-18 @ 06:10]    PT/INR: PT 11.3 , INR 1.02       [03-26-18 @ 05:45]  PTT: 45.0       [03-26-18 @ 05:45]    Creatinine Trend:  SCr 4.40 [03-27 @ 06:10]  SCr 4.69 [03-26 @ 11:08]  SCr 4.65 [03-26 @ 05:45]  SCr 4.53 [03-25 @ 06:55]  SCr 4.19 [03-24 @ 06:50]    Iron 21, TIBC 163, %sat --      [02-20-18 @ 05:00]  Ferritin 540.7      [02-20-18 @ 05:00]  HbA1c 5.9      [02-13-18 @ 07:11]  TSH 3.22      [03-08-18 @ 06:00] [facility] DIVISION OF KIDNEY DISEASES AND HYPERTENSION -- FOLLOW UP NOTE  --------------------------------------------------------------------------------  HPI: 63-year-old male with PMH of HTN, DM, right BKA, and CKD admitted for left foot infection. As per review of labs on [place]/[employer], Scr was 1.51 in 3/2017. As per PMD's office, Scr checked in 7/2017 was 2.30. Labs on admission (2/12) showed elevated Scr of 3.8 which peaked to 6.3 on 2/23.. Pt. underwent vascular study/left leg angiogram on 3/8. Patient seen and examined today. Pt. denies SOB, CP or N/V.     PAST HISTORY  --------------------------------------------------------------------------------  No significant changes to PMH, PSH, FHx, SHx, unless otherwise noted    ALLERGIES & MEDICATIONS  --------------------------------------------------------------------------------  Allergies    No Known Allergies    Intolerances    Standing Inpatient Medications  aspirin enteric coated 81 milliGRAM(s) Oral daily  atorvastatin 80 milliGRAM(s) Oral at bedtime  cyanocobalamin 1000 MICROGram(s) Oral daily  dextrose 5%. 1000 milliLiter(s) IV Continuous <Continuous>  dextrose 50% Injectable 25 Gram(s) IV Push once  dextrose 50% Injectable 25 Gram(s) IV Push once  furosemide    Tablet 60 milliGRAM(s) Oral daily  heparin  Injectable 5000 Unit(s) SubCutaneous every 8 hours  influenza   Vaccine 0.5 milliLiter(s) IntraMuscular once  insulin lispro (HumaLOG) corrective regimen sliding scale   SubCutaneous three times a day before meals  labetalol 200 milliGRAM(s) Oral three times a day  multivitamin 1 Tablet(s) Oral daily  NIFEdipine XL 90 milliGRAM(s) Oral daily  silver sulfADIAZINE 1% Cream 1 Application(s) Topical daily    REVIEW OF SYSTEMS  --------------------------------------------------------------------------------  General: no fever  CVS: no chest pain  RESP: no SOB, no cough  ABD: no abdominal pain  : no dysuria  MSK: no edema     VITALS/PHYSICAL EXAM  --------------------------------------------------------------------------------  T(C): 37.5 (03-27-18 @ 04:49), Max: 37.5 (03-27-18 @ 04:49)  HR: 66 (03-27-18 @ 04:49) (64 - 66)  BP: 145/51 (03-27-18 @ 04:49) (145/51 - 150/65)  RR: 18 (03-27-18 @ 04:49) (18 - 18)  SpO2: 100% (03-27-18 @ 04:49) (96% - 100%)  Wt(kg): --    03-26-18 @ 07:01  -  03-27-18 @ 07:00  --------------------------------------------------------  IN: 320 mL / OUT: 850 mL / NET: -530 mL    Physical Exam:              Gen: NAD  	HEENT: No JVD  	Pulm: decreased breath sounds B/L  	CV: S1S2  	Abd: Soft, +BS  	Ext: no edema, + right BKA              Neuro: Awake, alert   	Skin: Warm and Dry    LABS/STUDIES  --------------------------------------------------------------------------------              11.3   5.90  >-----------<  268      [03-27-18 @ 06:10]              35.9     141  |  101  |  90  ----------------------------<  92      [03-27-18 @ 06:10]  4.9   |  24  |  4.40        Ca     9.5     [03-27-18 @ 06:10]      Mg     2.3     [03-27-18 @ 06:10]      Phos  4.8     [03-27-18 @ 06:10]    Creatinine Trend:  SCr 4.40 [03-27 @ 06:10]  SCr 4.69 [03-26 @ 11:08]  SCr 4.65 [03-26 @ 05:45]  SCr 4.53 [03-25 @ 06:55]  SCr 4.19 [03-24 @ 06:50]

## 2018-03-27 NOTE — PROGRESS NOTE ADULT - SUBJECTIVE AND OBJECTIVE BOX
Patient is a 63y old  Male who presents with a chief complaint of 63M PMH DM, HTN, CKD p/w L foot pain x 3 days. (19 Feb 2018 10:39)      Vascular Surgery Attending Progress Note    Interval HPI: pt w/o new c/o     Medications:  acetaminophen   Tablet. 650 milliGRAM(s) Oral every 6 hours PRN  aspirin enteric coated 81 milliGRAM(s) Oral daily  atorvastatin 80 milliGRAM(s) Oral at bedtime  benzocaine 15 mG/menthol 3.6 mG Lozenge 1 Lozenge Oral every 2 hours PRN  cyanocobalamin 1000 MICROGram(s) Oral daily  dextrose 5%. 1000 milliLiter(s) IV Continuous <Continuous>  dextrose 50% Injectable 25 Gram(s) IV Push once  dextrose 50% Injectable 25 Gram(s) IV Push once  dextrose Gel 1 Dose(s) Oral once PRN  furosemide    Tablet 60 milliGRAM(s) Oral daily  glucagon  Injectable 1 milliGRAM(s) IntraMuscular once PRN  heparin  Injectable 5000 Unit(s) SubCutaneous every 8 hours  influenza   Vaccine 0.5 milliLiter(s) IntraMuscular once  insulin lispro (HumaLOG) corrective regimen sliding scale   SubCutaneous three times a day before meals  labetalol 200 milliGRAM(s) Oral three times a day  multivitamin 1 Tablet(s) Oral daily  NIFEdipine XL 90 milliGRAM(s) Oral daily  silver sulfADIAZINE 1% Cream 1 Application(s) Topical daily      Vital Signs Last 24 Hrs  T(C): 36.9 (27 Mar 2018 21:15), Max: 37.5 (27 Mar 2018 04:49)  T(F): 98.5 (27 Mar 2018 21:15), Max: 99.5 (27 Mar 2018 04:49)  HR: 65 (27 Mar 2018 21:15) (62 - 66)  BP: 147/51 (27 Mar 2018 21:15) (145/51 - 149/61)  BP(mean): --  RR: 18 (27 Mar 2018 21:15) (18 - 19)  SpO2: 97% (27 Mar 2018 21:15) (97% - 100%)  I&O's Summary    26 Mar 2018 07:01  -  27 Mar 2018 07:00  --------------------------------------------------------  IN: 320 mL / OUT: 850 mL / NET: -530 mL    27 Mar 2018 07:01  -  27 Mar 2018 22:02  --------------------------------------------------------  IN: 111 mL / OUT: 200 mL / NET: -89 mL        Physical Exam:  Neuro  A&Ox3 VSS  Vascular:  lt toe 1 tip wound w limited granulation issue and  ball of the foot  wound 2cm diam w limited granulation tissue      LABS:                        11.3   5.90  )-----------( 268      ( 27 Mar 2018 06:10 )             35.9     03-27    141  |  101  |  90<H>  ----------------------------<  92  4.9   |  24  |  4.40<H>    Ca    9.5      27 Mar 2018 06:10  Phos  4.8     03-27  Mg     2.3     03-27      PT/INR - ( 26 Mar 2018 05:45 )   PT: 11.3 SEC;   INR: 1.02          PTT - ( 26 Mar 2018 05:45 )  PTT:45.0 SEC    BARI NICOLAS MD  [phone]

## 2018-03-27 NOTE — PROGRESS NOTE ADULT - ATTENDING COMMENTS
Patient was seen and examined personally by me. I have discussed the plan and reviewed the resident's note and agree with the above physical exam findings including assessment and plan except as indicated below.    63 M PMH of DM, HTN, CKD, Rt BKA in 2009 who p/w sepsis 2/2 cellulitis and LLE SFA disease, c/b ATN, planned for Lt fem-pop bypass but canceled due to acute hypoxic respiratory failure 2/2 fluid overload, likely 2/2 acute diastolic CHF, now improved with diuresis.    No overnight events.  No CP, SOB, N/V/D.  Able to light flat without dyspnea on room air    Plan for fem pop bypass rescheduled for Thurs, 3/29 by vascular  Continue lasix 60mg daily, creatinine stable and presently euvolemic, c/w incentive spirometry  Hold diuretics day of surgery and hold short acting insulin day of procedure, patient not on long acting insulin

## 2018-03-27 NOTE — PROGRESS NOTE ADULT - ASSESSMENT
LEFT toes, forefoot, midfoot blister (improving).    Plan:  - Pt seen and evaluated  - Appearance of foot continues to improve  - Cont IV abx  - Silvadene to LEFT foot wound daily  - No pod sx intervention at this time  - [facility] planning bypass for Thursday.  - Will cont to follow.

## 2018-03-27 NOTE — PROGRESS NOTE ADULT - SUBJECTIVE AND OBJECTIVE BOX
Patient is a 63y old  Male who presents with a chief complaint of 63M PMH DM, HTN, CKD p/w L foot pain x 3 days. (19 Feb 2018 10:39)       INTERVAL HPI/OVERNIGHT EVENTS:  Patient seen and evaluated at bedside.  Pt is resting comfortable in NAD. Denies N/V/F/C.  Pain rated at X/10    Allergies    No Known Allergies    Intolerances        Vital Signs Last 24 Hrs  T(C): 37.5 (27 Mar 2018 04:49), Max: 37.5 (27 Mar 2018 04:49)  T(F): 99.5 (27 Mar 2018 04:49), Max: 99.5 (27 Mar 2018 04:49)  HR: 66 (27 Mar 2018 04:49) (64 - 66)  BP: 145/51 (27 Mar 2018 04:49) (145/51 - 150/65)  BP(mean): --  RR: 18 (27 Mar 2018 04:49) (18 - 18)  SpO2: 100% (27 Mar 2018 04:49) (96% - 100%)    LABS:                        11.3   5.90  )-----------( 268      ( 27 Mar 2018 06:10 )             35.9     03-27    141  |  101  |  90<H>  ----------------------------<  92  4.9   |  24  |  4.40<H>    Ca    9.5      27 Mar 2018 06:10  Phos  4.8     03-27  Mg     2.3     03-27      PT/INR - ( 26 Mar 2018 05:45 )   PT: 11.3 SEC;   INR: 1.02          PTT - ( 26 Mar 2018 05:45 )  PTT:45.0 SEC    CAPILLARY BLOOD GLUCOSE      POCT Blood Glucose.: 84 mg/dL (27 Mar 2018 06:10)  POCT Blood Glucose.: 107 mg/dL (26 Mar 2018 22:46)  POCT Blood Glucose.: 119 mg/dL (26 Mar 2018 17:06)  POCT Blood Glucose.: 177 mg/dL (26 Mar 2018 11:59)      Lower Extremity Physical Exam:  Vasular: DP/PT 0/4, B/L, CFT <2 seconds B/L, Temperature gradient warm, B/L.   Neuro: Epicritic sensation absent to the level of toes, B/L.  Musculoskeletal/Ortho: RIGHT BKA.  Skin: Left foot deroofed blister with cherry red non-blanchable trophic changes to plantar aspect of toes 1-5, forefoot, midfoot, no ascending erythema or swelling, no malodor, no purulence, no deep probe, etiology unknown, ROM of toes intact, CFT to each toes normal, no sinus tract, no undermining.     RADIOLOGY & ADDITIONAL TESTS:

## 2018-03-28 ENCOUNTER — TRANSCRIPTION ENCOUNTER (OUTPATIENT)
Age: 63
End: 2018-03-28

## 2018-03-28 DIAGNOSIS — E87.5 HYPERKALEMIA: ICD-10-CM

## 2018-03-28 LAB
APTT BLD: 36 SEC — SIGNIFICANT CHANGE UP (ref 27.5–37.4)
BLD GP AB SCN SERPL QL: NEGATIVE — SIGNIFICANT CHANGE UP
BUN SERPL-MCNC: 89 MG/DL — HIGH (ref 7–23)
BUN SERPL-MCNC: 91 MG/DL — HIGH (ref 7–23)
CALCIUM SERPL-MCNC: 8.9 MG/DL — SIGNIFICANT CHANGE UP (ref 8.4–10.5)
CALCIUM SERPL-MCNC: 9.2 MG/DL — SIGNIFICANT CHANGE UP (ref 8.4–10.5)
CHLORIDE SERPL-SCNC: 101 MMOL/L — SIGNIFICANT CHANGE UP (ref 98–107)
CHLORIDE SERPL-SCNC: 99 MMOL/L — SIGNIFICANT CHANGE UP (ref 98–107)
CO2 SERPL-SCNC: 26 MMOL/L — SIGNIFICANT CHANGE UP (ref 22–31)
CO2 SERPL-SCNC: 27 MMOL/L — SIGNIFICANT CHANGE UP (ref 22–31)
CREAT SERPL-MCNC: 4.31 MG/DL — HIGH (ref 0.5–1.3)
CREAT SERPL-MCNC: 4.37 MG/DL — HIGH (ref 0.5–1.3)
GLUCOSE SERPL-MCNC: 142 MG/DL — HIGH (ref 70–99)
GLUCOSE SERPL-MCNC: 99 MG/DL — SIGNIFICANT CHANGE UP (ref 70–99)
HCT VFR BLD CALC: 33 % — LOW (ref 39–50)
HGB BLD-MCNC: 10.3 G/DL — LOW (ref 13–17)
INR BLD: 1.05 — SIGNIFICANT CHANGE UP (ref 0.88–1.17)
MAGNESIUM SERPL-MCNC: 2.3 MG/DL — SIGNIFICANT CHANGE UP (ref 1.6–2.6)
MCHC RBC-ENTMCNC: 27 PG — SIGNIFICANT CHANGE UP (ref 27–34)
MCHC RBC-ENTMCNC: 31.2 % — LOW (ref 32–36)
MCV RBC AUTO: 86.6 FL — SIGNIFICANT CHANGE UP (ref 80–100)
NRBC # FLD: 0 — SIGNIFICANT CHANGE UP
PHOSPHATE SERPL-MCNC: 4.9 MG/DL — HIGH (ref 2.5–4.5)
PLATELET # BLD AUTO: 251 K/UL — SIGNIFICANT CHANGE UP (ref 150–400)
PMV BLD: 11.1 FL — SIGNIFICANT CHANGE UP (ref 7–13)
POTASSIUM SERPL-MCNC: 5.3 MMOL/L — SIGNIFICANT CHANGE UP (ref 3.5–5.3)
POTASSIUM SERPL-MCNC: 5.7 MMOL/L — HIGH (ref 3.5–5.3)
POTASSIUM SERPL-SCNC: 5.3 MMOL/L — SIGNIFICANT CHANGE UP (ref 3.5–5.3)
POTASSIUM SERPL-SCNC: 5.7 MMOL/L — HIGH (ref 3.5–5.3)
PROTHROM AB SERPL-ACNC: 11.7 SEC — SIGNIFICANT CHANGE UP (ref 9.8–13.1)
RBC # BLD: 3.81 M/UL — LOW (ref 4.2–5.8)
RBC # FLD: 14.3 % — SIGNIFICANT CHANGE UP (ref 10.3–14.5)
RH IG SCN BLD-IMP: POSITIVE — SIGNIFICANT CHANGE UP
SODIUM SERPL-SCNC: 141 MMOL/L — SIGNIFICANT CHANGE UP (ref 135–145)
SODIUM SERPL-SCNC: 143 MMOL/L — SIGNIFICANT CHANGE UP (ref 135–145)
WBC # BLD: 5.5 K/UL — SIGNIFICANT CHANGE UP (ref 3.8–10.5)
WBC # FLD AUTO: 5.5 K/UL — SIGNIFICANT CHANGE UP (ref 3.8–10.5)

## 2018-03-28 PROCEDURE — 99232 SBSQ HOSP IP/OBS MODERATE 35: CPT

## 2018-03-28 PROCEDURE — 99232 SBSQ HOSP IP/OBS MODERATE 35: CPT | Mod: GC

## 2018-03-28 PROCEDURE — 99233 SBSQ HOSP IP/OBS HIGH 50: CPT | Mod: GC

## 2018-03-28 PROCEDURE — 71045 X-RAY EXAM CHEST 1 VIEW: CPT | Mod: 26

## 2018-03-28 PROCEDURE — 93010 ELECTROCARDIOGRAM REPORT: CPT

## 2018-03-28 RX ORDER — SODIUM CHLORIDE 9 MG/ML
1000 INJECTION, SOLUTION INTRAVENOUS
Refills: 0 | Status: DISCONTINUED | OUTPATIENT
Start: 2018-03-28 | End: 2018-03-28

## 2018-03-28 RX ORDER — FUROSEMIDE 40 MG
60 TABLET ORAL ONCE
Refills: 0 | Status: DISCONTINUED | OUTPATIENT
Start: 2018-03-28 | End: 2018-03-28

## 2018-03-28 RX ORDER — SODIUM POLYSTYRENE SULFONATE 4.1 MEQ/G
15 POWDER, FOR SUSPENSION ORAL ONCE
Refills: 0 | Status: COMPLETED | OUTPATIENT
Start: 2018-03-28 | End: 2018-03-28

## 2018-03-28 RX ORDER — FUROSEMIDE 40 MG
60 TABLET ORAL ONCE
Refills: 0 | Status: COMPLETED | OUTPATIENT
Start: 2018-03-28 | End: 2018-03-28

## 2018-03-28 RX ORDER — SODIUM CHLORIDE 9 MG/ML
1000 INJECTION INTRAMUSCULAR; INTRAVENOUS; SUBCUTANEOUS
Refills: 0 | Status: DISCONTINUED | OUTPATIENT
Start: 2018-03-28 | End: 2018-03-29

## 2018-03-28 RX ORDER — ONDANSETRON 8 MG/1
4 TABLET, FILM COATED ORAL ONCE
Refills: 0 | Status: COMPLETED | OUTPATIENT
Start: 2018-03-28 | End: 2018-03-28

## 2018-03-28 RX ORDER — SODIUM POLYSTYRENE SULFONATE 4.1 MEQ/G
30 POWDER, FOR SUSPENSION ORAL ONCE
Refills: 0 | Status: COMPLETED | OUTPATIENT
Start: 2018-03-28 | End: 2018-03-28

## 2018-03-28 RX ADMIN — Medication 200 MILLIGRAM(S): at 05:26

## 2018-03-28 RX ADMIN — PREGABALIN 1000 MICROGRAM(S): 225 CAPSULE ORAL at 12:45

## 2018-03-28 RX ADMIN — SODIUM POLYSTYRENE SULFONATE 30 GRAM(S): 4.1 POWDER, FOR SUSPENSION ORAL at 17:00

## 2018-03-28 RX ADMIN — HEPARIN SODIUM 5000 UNIT(S): 5000 INJECTION INTRAVENOUS; SUBCUTANEOUS at 22:52

## 2018-03-28 RX ADMIN — ATORVASTATIN CALCIUM 80 MILLIGRAM(S): 80 TABLET, FILM COATED ORAL at 22:52

## 2018-03-28 RX ADMIN — Medication 90 MILLIGRAM(S): at 05:26

## 2018-03-28 RX ADMIN — Medication 1 TABLET(S): at 12:45

## 2018-03-28 RX ADMIN — HEPARIN SODIUM 5000 UNIT(S): 5000 INJECTION INTRAVENOUS; SUBCUTANEOUS at 12:45

## 2018-03-28 RX ADMIN — Medication 81 MILLIGRAM(S): at 12:45

## 2018-03-28 RX ADMIN — Medication 200 MILLIGRAM(S): at 12:45

## 2018-03-28 RX ADMIN — Medication 60 MILLIGRAM(S): at 17:00

## 2018-03-28 RX ADMIN — ONDANSETRON 4 MILLIGRAM(S): 8 TABLET, FILM COATED ORAL at 18:58

## 2018-03-28 RX ADMIN — Medication 60 MILLIGRAM(S): at 05:26

## 2018-03-28 RX ADMIN — Medication 200 MILLIGRAM(S): at 22:52

## 2018-03-28 RX ADMIN — Medication 60 MILLIGRAM(S): at 22:52

## 2018-03-28 RX ADMIN — HEPARIN SODIUM 5000 UNIT(S): 5000 INJECTION INTRAVENOUS; SUBCUTANEOUS at 05:27

## 2018-03-28 RX ADMIN — SODIUM POLYSTYRENE SULFONATE 15 GRAM(S): 4.1 POWDER, FOR SUSPENSION ORAL at 22:52

## 2018-03-28 NOTE — PROGRESS NOTE ADULT - SUBJECTIVE AND OBJECTIVE BOX
Date of Admission:    24H hour events:     MEDICATIONS:  aspirin enteric coated 81 milliGRAM(s) Oral daily  furosemide    Tablet 60 milliGRAM(s) Oral daily  heparin  Injectable 5000 Unit(s) SubCutaneous every 8 hours  labetalol 200 milliGRAM(s) Oral three times a day  NIFEdipine XL 90 milliGRAM(s) Oral daily        acetaminophen   Tablet. 650 milliGRAM(s) Oral every 6 hours PRN      atorvastatin 80 milliGRAM(s) Oral at bedtime  dextrose 50% Injectable 25 Gram(s) IV Push once  dextrose 50% Injectable 25 Gram(s) IV Push once  dextrose Gel 1 Dose(s) Oral once PRN  glucagon  Injectable 1 milliGRAM(s) IntraMuscular once PRN  insulin lispro (HumaLOG) corrective regimen sliding scale   SubCutaneous three times a day before meals    benzocaine 15 mG/menthol 3.6 mG Lozenge 1 Lozenge Oral every 2 hours PRN  cyanocobalamin 1000 MICROGram(s) Oral daily  dextrose 5%. 1000 milliLiter(s) IV Continuous <Continuous>  influenza   Vaccine 0.5 milliLiter(s) IntraMuscular once  lactated ringers. 1000 milliLiter(s) IV Continuous <Continuous>  multivitamin 1 Tablet(s) Oral daily  silver sulfADIAZINE 1% Cream 1 Application(s) Topical daily      REVIEW OF SYSTEMS:  Complete 10point ROS negative.    PHYSICAL EXAM:  T(C): 36.7 (03-28-18 @ 12:43), Max: 37 (03-28-18 @ 01:21)  HR: 65 (03-28-18 @ 12:43) (63 - 65)  BP: 135/54 (03-28-18 @ 12:43) (122/52 - 156/72)  RR: 18 (03-28-18 @ 12:43) (16 - 18)  SpO2: 96% (03-28-18 @ 12:43) (94% - 99%)  Wt(kg): --  I&O's Summary    27 Mar 2018 07:01  -  28 Mar 2018 07:00  --------------------------------------------------------  IN: 111 mL / OUT: 600 mL / NET: -489 mL    28 Mar 2018 07:01  -  28 Mar 2018 14:27  --------------------------------------------------------  IN: 0 mL / OUT: 200 mL / NET: -200 mL        Appearance: Normal	  HEENT:   Normal oral mucosa, PERRL, EOMI	  Lymphatic: No lymphadenopathy  Cardiovascular: Normal S1 S2, No JVD, No murmurs, No edema  Respiratory: Lungs clear to auscultation	  Psychiatry: A & O x 3, Mood & affect appropriate  Gastrointestinal:  Soft, Non-tender, + BS	  Skin: No rashes, No ecchymoses, No cyanosis	  Neurologic: Non-focal  Extremities: Normal range of motion, No clubbing, cyanosis or edema  Vascular: Peripheral pulses palpable 2+ bilaterally        LABS:	 	    CBC Full  -  ( 28 Mar 2018 05:45 )  WBC Count : 5.50 K/uL  Hemoglobin : 10.3 g/dL  Hematocrit : 33.0 %  Platelet Count - Automated : 251 K/uL  Mean Cell Volume : 86.6 fL  Mean Cell Hemoglobin : 27.0 pg  Mean Cell Hemoglobin Concentration : 31.2 %  Auto Neutrophil # : x  Auto Lymphocyte # : x  Auto Monocyte # : x  Auto Eosinophil # : x  Auto Basophil # : x  Auto Neutrophil % : x  Auto Lymphocyte % : x  Auto Monocyte % : x  Auto Eosinophil % : x  Auto Basophil % : x    03-28    143  |  101  |  91<H>  ----------------------------<  142<H>  5.3   |  27  |  4.37<H>  03-28    141  |  99  |  89<H>  ----------------------------<  99  5.7<H>   |  26  |  4.31<H>    Ca    9.2      28 Mar 2018 12:48  Ca    8.9      28 Mar 2018 05:45  Phos  4.9     03-28  Phos  4.8     03-27  Mg     2.3     03-28  Mg     2.3     03-27        	  ASSESSMENT/PLAN: 	    Pt lying flat, not dyspneic. No signs of volume overload. May safely proceed with planned procedure from cardiac perspective.        Marlon Corbett MD, FACC  [id number]

## 2018-03-28 NOTE — PROGRESS NOTE ADULT - SUBJECTIVE AND OBJECTIVE BOX
Patient is a 63y old  Male who presents with a chief complaint of 63M PMH DM, HTN, CKD p/w L foot pain x 3 days. (19 Feb 2018 10:39)      SUBJECTIVE / OVERNIGHT EVENTS:    MEDICATIONS  (STANDING):  aspirin enteric coated 81 milliGRAM(s) Oral daily  atorvastatin 80 milliGRAM(s) Oral at bedtime  cyanocobalamin 1000 MICROGram(s) Oral daily  dextrose 5%. 1000 milliLiter(s) (50 mL/Hr) IV Continuous <Continuous>  dextrose 50% Injectable 25 Gram(s) IV Push once  dextrose 50% Injectable 25 Gram(s) IV Push once  furosemide    Tablet 60 milliGRAM(s) Oral daily  heparin  Injectable 5000 Unit(s) SubCutaneous every 8 hours  influenza   Vaccine 0.5 milliLiter(s) IntraMuscular once  insulin lispro (HumaLOG) corrective regimen sliding scale   SubCutaneous three times a day before meals  labetalol 200 milliGRAM(s) Oral three times a day  multivitamin 1 Tablet(s) Oral daily  NIFEdipine XL 90 milliGRAM(s) Oral daily  silver sulfADIAZINE 1% Cream 1 Application(s) Topical daily    MEDICATIONS  (PRN):  acetaminophen   Tablet. 650 milliGRAM(s) Oral every 6 hours PRN Moderate Pain (4 - 6)  benzocaine 15 mG/menthol 3.6 mG Lozenge 1 Lozenge Oral every 2 hours PRN Sore Throat  dextrose Gel 1 Dose(s) Oral once PRN Blood Glucose LESS THAN 70 milliGRAM(s)/deciliter  glucagon  Injectable 1 milliGRAM(s) IntraMuscular once PRN Glucose LESS THAN 70 milligrams/deciliter        CAPILLARY BLOOD GLUCOSE      POCT Blood Glucose.: 106 mg/dL (27 Mar 2018 21:14)  POCT Blood Glucose.: 218 mg/dL (27 Mar 2018 16:58)  POCT Blood Glucose.: 103 mg/dL (27 Mar 2018 12:12)    I&O's Summary    27 Mar 2018 07:01  -  28 Mar 2018 07:00  --------------------------------------------------------  IN: 111 mL / OUT: 600 mL / NET: -489 mL        PHYSICAL EXAM:  GENERAL: NAD, well-developed  HEAD:  Atraumatic, Normocephalic  EYES: EOMI, PERRLA, conjunctiva and sclera clear  NECK: Supple, No JVD  CHEST/LUNG: Clear to auscultation bilaterally; No wheeze  HEART: Regular rate and rhythm; No murmurs, rubs, or gallops  ABDOMEN: Soft, Nontender, Nondistended; Bowel sounds present  EXTREMITIES:  2+ Peripheral Pulses, No clubbing, cyanosis, or edema  PSYCH: AAOx3  NEUROLOGY: non-focal  SKIN: No rashes or lesions    LABS:  (03-28 @ 05:45)                        10.3  5.50 )-----------( 251                 33.0    Neutrophils = -- (--%)  Lymphocytes = -- (--%)  Eosinophils = -- (--%)  Basophils = -- (--%)  Monocytes = -- (--%)  Bands = --%    WBC Trend: 5.50<--, 5.90<--, 5.69<--  Hb Trend: 10.3<--, 11.3<--, 11.0<--, 11.4<--, 11.3<--  Plt Trend: 251<--, 268<--, 289<--, 268<--, 270<--  03-28    141  |  99  |  89<H>  ----------------------------<  99  5.7<H>   |  26  |  4.31<H>    Ca    8.9      28 Mar 2018 05:45  Phos  4.9     03-28  Mg     2.3     03-28      Creatinine Trend: 4.31<--, 4.40<--, 4.69<--, 4.65<--, 4.53<--, 4.19<--  ( 28 Mar 2018 05:45 )   PT: 11.7 SEC;   INR: 1.05 ;       PTT:36.0 SEC          Microbiology:  Urine Cx:  Blood Cx:    RADIOLOGY & ADDITIONAL TESTS:  X- Ray:  CT:  Ultrasound:  [ ] imaging personally reviewed and interpreted by me    Consultant(s) Notes Reviewed:      Care Discussed with Consultants/Other Providers: Patient is a 63y old  Male who presents with a chief complaint of 63M PMH DM, HTN, CKD p/w L foot pain x 3 days. (19 Feb 2018 10:39)      SUBJECTIVE / OVERNIGHT EVENTS: No acute overnight events. -600cc output. Denies any dyspnea; tolerates lying supine.     MEDICATIONS  (STANDING):  aspirin enteric coated 81 milliGRAM(s) Oral daily  atorvastatin 80 milliGRAM(s) Oral at bedtime  cyanocobalamin 1000 MICROGram(s) Oral daily  dextrose 5%. 1000 milliLiter(s) (50 mL/Hr) IV Continuous <Continuous>  dextrose 50% Injectable 25 Gram(s) IV Push once  dextrose 50% Injectable 25 Gram(s) IV Push once  furosemide    Tablet 60 milliGRAM(s) Oral daily  heparin  Injectable 5000 Unit(s) SubCutaneous every 8 hours  influenza   Vaccine 0.5 milliLiter(s) IntraMuscular once  insulin lispro (HumaLOG) corrective regimen sliding scale   SubCutaneous three times a day before meals  labetalol 200 milliGRAM(s) Oral three times a day  multivitamin 1 Tablet(s) Oral daily  NIFEdipine XL 90 milliGRAM(s) Oral daily  silver sulfADIAZINE 1% Cream 1 Application(s) Topical daily    MEDICATIONS  (PRN):  acetaminophen   Tablet. 650 milliGRAM(s) Oral every 6 hours PRN Moderate Pain (4 - 6)  benzocaine 15 mG/menthol 3.6 mG Lozenge 1 Lozenge Oral every 2 hours PRN Sore Throat  dextrose Gel 1 Dose(s) Oral once PRN Blood Glucose LESS THAN 70 milliGRAM(s)/deciliter  glucagon  Injectable 1 milliGRAM(s) IntraMuscular once PRN Glucose LESS THAN 70 milligrams/deciliter        CAPILLARY BLOOD GLUCOSE      POCT Blood Glucose.: 106 mg/dL (27 Mar 2018 21:14)  POCT Blood Glucose.: 218 mg/dL (27 Mar 2018 16:58)  POCT Blood Glucose.: 103 mg/dL (27 Mar 2018 12:12)    I&O's Summary    27 Mar 2018 07:01  -  28 Mar 2018 07:00  --------------------------------------------------------  IN: 111 mL / OUT: 600 mL / NET: -489 mL        PHYSICAL EXAM:  GENERAL: No acute distress speaking in full sentences   HEAD:  Atraumatic, Normocephalic  EYES: EOMI, PERRLA, conjunctiva and sclera clear  NECK: Supple, No JVD  CHEST/LUNG: Rales L base; diminished on right; No wheeze  HEART: Regular rate and rhythm; No murmurs, rubs, or gallops  ABDOMEN: Soft, Nontender, Nondistended; Bowel sounds present  EXTREMITIES:  2+ Peripheral Pulses, No edema; s/p R BKA. Left leg wound dressed; unable to palpate DP pulse   PSYCH: AAOx3  NEUROLOGY: non-focal  SKIN: No rashes or lesions    LABS:  (03-28 @ 05:45)                        10.3  5.50 )-----------( 251                 33.0    Neutrophils = -- (--%)  Lymphocytes = -- (--%)  Eosinophils = -- (--%)  Basophils = -- (--%)  Monocytes = -- (--%)  Bands = --%    WBC Trend: 5.50<--, 5.90<--, 5.69<--  Hb Trend: 10.3<--, 11.3<--, 11.0<--, 11.4<--, 11.3<--  Plt Trend: 251<--, 268<--, 289<--, 268<--, 270<--  03-28    141  |  99  |  89<H>  ----------------------------<  99  5.7<H>   |  26  |  4.31<H>    Ca    8.9      28 Mar 2018 05:45  Phos  4.9     03-28  Mg     2.3     03-28      Creatinine Trend: 4.31<--, 4.40<--, 4.69<--, 4.65<--, 4.53<--, 4.19<--  ( 28 Mar 2018 05:45 )   PT: 11.7 SEC;   INR: 1.05 ;       PTT:36.0 SEC    Consultant(s) Notes Reviewed:  Vascular Sx    Care Discussed with Consultants/Other Providers: Vascular Sx Patient is a 63y old  Male who presents with a chief complaint of 63M PMH DM, HTN, CKD p/w L foot pain x 3 days. (19 Feb 2018 10:39)      SUBJECTIVE / OVERNIGHT EVENTS: No acute overnight events. -600cc output. Denies any dyspnea; tolerates lying supine.     MEDICATIONS  (STANDING):  aspirin enteric coated 81 milliGRAM(s) Oral daily  atorvastatin 80 milliGRAM(s) Oral at bedtime  cyanocobalamin 1000 MICROGram(s) Oral daily  dextrose 5%. 1000 milliLiter(s) (50 mL/Hr) IV Continuous <Continuous>  dextrose 50% Injectable 25 Gram(s) IV Push once  dextrose 50% Injectable 25 Gram(s) IV Push once  furosemide    Tablet 60 milliGRAM(s) Oral daily  heparin  Injectable 5000 Unit(s) SubCutaneous every 8 hours  influenza   Vaccine 0.5 milliLiter(s) IntraMuscular once  insulin lispro (HumaLOG) corrective regimen sliding scale   SubCutaneous three times a day before meals  labetalol 200 milliGRAM(s) Oral three times a day  multivitamin 1 Tablet(s) Oral daily  NIFEdipine XL 90 milliGRAM(s) Oral daily  silver sulfADIAZINE 1% Cream 1 Application(s) Topical daily    MEDICATIONS  (PRN):  acetaminophen   Tablet. 650 milliGRAM(s) Oral every 6 hours PRN Moderate Pain (4 - 6)  benzocaine 15 mG/menthol 3.6 mG Lozenge 1 Lozenge Oral every 2 hours PRN Sore Throat  dextrose Gel 1 Dose(s) Oral once PRN Blood Glucose LESS THAN 70 milliGRAM(s)/deciliter  glucagon  Injectable 1 milliGRAM(s) IntraMuscular once PRN Glucose LESS THAN 70 milligrams/deciliter        CAPILLARY BLOOD GLUCOSE      POCT Blood Glucose.: 106 mg/dL (27 Mar 2018 21:14)  POCT Blood Glucose.: 218 mg/dL (27 Mar 2018 16:58)  POCT Blood Glucose.: 103 mg/dL (27 Mar 2018 12:12)    I&O's Summary    27 Mar 2018 07:01  -  28 Mar 2018 07:00  --------------------------------------------------------  IN: 111 mL / OUT: 600 mL / NET: -489 mL        PHYSICAL EXAM:  GENERAL: No acute distress speaking in full sentences on room air  EYES: sclera clear  NECK: Supple, No JVD  CHEST/LUNG: Rales L base; diminished on right; No wheeze  HEART: Regular rate and rhythm; No murmurs, rubs, or gallops  ABDOMEN: Soft, Nontender, Nondistended; Bowel sounds present  EXTREMITIES:  2+ Peripheral Pulses, No edema; s/p R BKA. Left leg wound dressed; unable to palpate DP pulse   PSYCH: AAOx3  NEUROLOGY: non-focal  SKIN: No rashes or lesions    LABS:  (03-28 @ 05:45)                        10.3  5.50 )-----------( 251                 33.0    Neutrophils = -- (--%)  Lymphocytes = -- (--%)  Eosinophils = -- (--%)  Basophils = -- (--%)  Monocytes = -- (--%)  Bands = --%    WBC Trend: 5.50<--, 5.90<--, 5.69<--  Hb Trend: 10.3<--, 11.3<--, 11.0<--, 11.4<--, 11.3<--  Plt Trend: 251<--, 268<--, 289<--, 268<--, 270<--  03-28    141  |  99  |  89<H>  ----------------------------<  99  5.7<H>   |  26  |  4.31<H>    Ca    8.9      28 Mar 2018 05:45  Phos  4.9     03-28  Mg     2.3     03-28      Creatinine Trend: 4.31<--, 4.40<--, 4.69<--, 4.65<--, 4.53<--, 4.19<--  ( 28 Mar 2018 05:45 )   PT: 11.7 SEC;   INR: 1.05 ;       PTT:36.0 SEC    Consultant(s) Notes Reviewed:  Vascular Sx    Care Discussed with Consultants/Other Providers: Vascular Sx

## 2018-03-28 NOTE — PROGRESS NOTE ADULT - PROBLEM SELECTOR PLAN 4
-s/p angiogram with multi vessel disease s/p LE dopplers & vein mapping for Femoral-popliteal bypass. KENN .71 on left.   -c/w ASA and statin; Plan for OR on 03/29 for Fem-Pop Bypass.

## 2018-03-28 NOTE — PROGRESS NOTE ADULT - SUBJECTIVE AND OBJECTIVE BOX
Vascular Surgery Progress Note  C Team [id number]       SUBJECTIVE/interval events:  -no acute o/n events  -on OR schedule for tomorrow Thursday 3/29/18; confirmed.   -w/out complaints      OBJECTIVE:     ** VITAL SIGNS / I&O's **    Vital Signs Last 24 Hrs  T(C): 36.7 (28 Mar 2018 05:20), Max: 37 (28 Mar 2018 01:21)  T(F): 98 (28 Mar 2018 05:20), Max: 98.6 (28 Mar 2018 01:21)  HR: 63 (28 Mar 2018 05:20) (62 - 65)  BP: 135/67 (28 Mar 2018 05:20) (122/52 - 156/72)  BP(mean): --  RR: 18 (28 Mar 2018 05:20) (16 - 19)  SpO2: 98% (28 Mar 2018 05:20) (94% - 98%)      I&O's Summary    27 Mar 2018 07:01  -  28 Mar 2018 07:00  --------------------------------------------------------  IN: 111 mL / OUT: 600 mL / NET: -489 mL          ** PHYSICAL EXAM **    Gen: Alert, NAD, off supplemental oxygen  Pulm: non-labored breathing, airway patent  Ext: LLE w/ kerlex dressing in place; s/p BKA        ** MEDICATIONS **    MEDICATIONS  (STANDING):  aspirin enteric coated 81 milliGRAM(s) Oral daily  atorvastatin 80 milliGRAM(s) Oral at bedtime  cyanocobalamin 1000 MICROGram(s) Oral daily  dextrose 5%. 1000 milliLiter(s) (50 mL/Hr) IV Continuous <Continuous>  dextrose 50% Injectable 25 Gram(s) IV Push once  dextrose 50% Injectable 25 Gram(s) IV Push once  furosemide    Tablet 60 milliGRAM(s) Oral daily  heparin  Injectable 5000 Unit(s) SubCutaneous every 8 hours  influenza   Vaccine 0.5 milliLiter(s) IntraMuscular once  insulin lispro (HumaLOG) corrective regimen sliding scale   SubCutaneous three times a day before meals  labetalol 200 milliGRAM(s) Oral three times a day  multivitamin 1 Tablet(s) Oral daily  NIFEdipine XL 90 milliGRAM(s) Oral daily  silver sulfADIAZINE 1% Cream 1 Application(s) Topical daily    MEDICATIONS  (PRN):  acetaminophen   Tablet. 650 milliGRAM(s) Oral every 6 hours PRN Moderate Pain (4 - 6)  benzocaine 15 mG/menthol 3.6 mG Lozenge 1 Lozenge Oral every 2 hours PRN Sore Throat  dextrose Gel 1 Dose(s) Oral once PRN Blood Glucose LESS THAN 70 milliGRAM(s)/deciliter  glucagon  Injectable 1 milliGRAM(s) IntraMuscular once PRN Glucose LESS THAN 70 milligrams/deciliter      ** LABS **                          10.3   5.50  )-----------( 251      ( 28 Mar 2018 05:45 )             33.0                         11.0   5.69  )-----------( 289      ( 26 Mar 2018 05:45 )             34.9                         11.2   6.27  )-----------( 287      ( 23 Mar 2018 06:20 )             34.7                         11.4   5.48  )-----------( 281      ( 22 Mar 2018 06:50 )             35.4                         12.6   5.20  )-----------( 291      ( 21 Mar 2018 06:45 )             38.4                         10.9   4.66  )-----------( 231      ( 19 Mar 2018 06:21 )             35.3     03-28    141  |  99  |  89<H>  ----------------------------<  99  5.7<H>   |  26  |  4.31<H>    Ca    8.9      28 Mar 2018 05:45  Phos  4.9     03-28  Mg     2.3     03-28 03-26    143  |  101  |  90<H>  ----------------------------<  95  5.3   |  25  |  4.65<H>    Ca    9.1      26 Mar 2018 05:45  Phos  4.8     03-26  Mg     2.2     03-26 03-23    143  |  100  |  72<H>  ----------------------------<  79  4.6   |  28  |  4.41<H>    Ca    9.2      23 Mar 2018 06:20  Phos  5.2     03-23  Mg     1.9     03-23 03-22    142  |  97<L>  |  74<H>  ----------------------------<  92  4.6   |  30  |  4.66<H>    Ca    8.9      22 Mar 2018 06:50  Phos  6.0     03-22  Mg     1.9     03-22      19 Mar 2018 06:21    142    |  101    |  53     ----------------------------<  65     5.3     |  24     |  4.17     Ca    8.9        19 Mar 2018 06:21  Phos  4.8       19 Mar 2018 06:21  Mg     2.1       19 Mar 2018 06:21    PT/INR - ( 28 Mar 2018 05:45 )   PT: 11.7 SEC;   INR: 1.05     PTT - ( 28 Mar 2018 05:45 )  PTT:36.0 SEC      Serum Pro-Brain Natriuretic Peptide (03.17.18 @ 06:45)    Serum Pro-Brain Natriuretic Peptide: 8301: ACUTE CONGESTIVE HEART FAILURE is UNLIKELY if NT-proBNP is < 300 pg/mL.    Hemoglobin A1C, Whole Blood (02.13.18 @ 07:11)    Hemoglobin A1C, Whole Blood: 5.9: High Risk (prediabetic)    5.7 - 6.4 %      CAPILLARY BLOOD GLUCOSE      POCT Blood Glucose.: 89 mg/dL (19 Mar 2018 08:15)  POCT Blood Glucose.: 104 mg/dL (18 Mar 2018 22:17)  POCT Blood Glucose.: 128 mg/dL (18 Mar 2018 17:26)  POCT Blood Glucose.: 106 mg/dL (18 Mar 2018 12:22)          ** IMAGING **    Xray Chest 1 View- PORTABLE-Routine (03.26.18 @ 08:07) >    Heart size and the mediastinum cannot be accurately evaluated on this   projection.  There is mild subsegmental atelectasis at the right base and a trace   right pleural effusion.  Left basilar and retrocardiac opacity with obscuration of the left   hemidiaphragm is again seen. The left basilar opacity is slightly   improved.  No pneumothorax seen.        Xray Chest 2 Views PA/Lat (03.19.18 @ 18:02) >  IMPRESSION:  Moderate right and small left pleural effusions with likely   associated passive atelectasis. Underlying atelectasis of other cause   and/or pneumonia in the appropriate clinical context is not excluded. No   pneumonia was reported on the CT scan, however.      CT Chest No Cont (03.18.18 @ 09:50) >  LUNGS, PLEURA AND LARGE AIRWAYS: Large bilateral pleural effusions   resulting in near complete compressive atelectasis involving the right   middle and both lower lobes. Patent central airways.   VESSELS: Atherosclerotic changes of the aorta and coronary arteries.  HEART: Heart size is enlarged.No pericardial effusion. Mitral annular and   aortic valvular calcifications. MEDIASTINUM AND ROBERT: Calcified left   hilar lymph node is noted.  CHEST WALL AND LOWER NECK: Within normal limits.  VISUALIZED UPPER ABDOMEN: Within normal limits.  BONES: Within normal limits.    IMPRESSION:     Large bilateral pleural effusions resulting compressive atelectasis as   described above.      Transthoracic Echocardiogram (03.02.18 @ 11:03) >  CONCLUSIONS:  1. Mitral annular calcification, otherwise normal mitral  valve. Mild mitral regurgitation.  2. Aortic valve leaflet morphology not well visualized.  Peak transaortic valve gradient equals 19 mm Hg, mean  transaortic valve gradient equals 11 mm Hg, consistent with  probable mild aortic stenosis. Minimal aortic  regurgitation.  3. Normal left ventricular internal dimensions and wall  thicknesses.  4. Endocardium not well visualized; grossly normal left  ventricular systolic function.  5. The right ventricle is not well visualized; grossly  normal right ventricular systolic function.  6. Estimated right ventricular systolic pressure equals 57  mm Hg, assuming right atrial pressure equals 10 mm Hg,  consistent with moderate pulmonary hypertension.  7. Bilateral pleural effusions.        VA Duplex Ext Veins Lower Comp, Bilat. (03.09.18 @ 16:18) >  Summary/Impressions:  No evidence of thrombosis or significant venous wall  thickening noted in the visualized bilateral great  saphenous veins.  Vesseldiameters as noted above.       VA Duplex Ext Veins Lower Comp, Bilat. (03.09.18 @ 10:47) >  Summary/Impressions:  1.  No evidence of deep vein thrombosis in thevisualized  right and left lower extremities.  2.  No evidence of deep and superficial venous  insufficiency noted in the visualized right and left lower  extremities.        VA Duplex Physiol Ext Low 3+ Level, BI. (02.13.18 @ 14:12) >  Right Findings: History of right lower extremity  amputation.  Left Findings: The ankle-brachial index (KENN) on the left  is mildly decreased (0.71).  The toe-brachial index (TBI) on the left was not assessed  (overlying open wounds and  dressings).  Pulse volume recording (PVR) waveforms appear triphasic  with normal amplitudes at the left thigh, calf and ankle.  Waveforms are reduced in amplitude at the metatarsal and  digit segments.  These findings suggest the presence of  small vessel arterial disease.  ------------------------------------------------------------------------  Summary/Impressions:  Left lower extremity KENN is mildly decreased (0.71).  Left  TBI was not assessed.  Waveform analysis suggest the  presence of small vessel arterial disease. Vascular Surgery Progress Note  C Team [id number]       SUBJECTIVE/interval events:  -no acute o/n events  -on OR schedule for tomorrow Thursday 3/29/18; confirmed.   -w/out complaints      OBJECTIVE:     ** VITAL SIGNS / I&O's **    Vital Signs Last 24 Hrs  T(C): 36.7 (28 Mar 2018 05:20), Max: 37 (28 Mar 2018 01:21)  T(F): 98 (28 Mar 2018 05:20), Max: 98.6 (28 Mar 2018 01:21)  HR: 63 (28 Mar 2018 05:20) (62 - 65)  BP: 135/67 (28 Mar 2018 05:20) (122/52 - 156/72)  BP(mean): --  RR: 18 (28 Mar 2018 05:20) (16 - 19)  SpO2: 98% (28 Mar 2018 05:20) (94% - 98%)      I&O's Summary    27 Mar 2018 07:01  -  28 Mar 2018 07:00  --------------------------------------------------------  IN: 111 mL / OUT: 600 mL / NET: -489 mL      ** PHYSICAL EXAM **    Gen: Alert, NAD, off supplemental oxygen  Pulm: non-labored breathing, airway patent  Ext: LLE w/ kerlex dressing in place; s/p BKA    ** MEDICATIONS **    MEDICATIONS  (STANDING):  aspirin enteric coated 81 milliGRAM(s) Oral daily  atorvastatin 80 milliGRAM(s) Oral at bedtime  cyanocobalamin 1000 MICROGram(s) Oral daily  dextrose 5%. 1000 milliLiter(s) (50 mL/Hr) IV Continuous <Continuous>  dextrose 50% Injectable 25 Gram(s) IV Push once  dextrose 50% Injectable 25 Gram(s) IV Push once  furosemide    Tablet 60 milliGRAM(s) Oral daily  heparin  Injectable 5000 Unit(s) SubCutaneous every 8 hours  influenza   Vaccine 0.5 milliLiter(s) IntraMuscular once  insulin lispro (HumaLOG) corrective regimen sliding scale   SubCutaneous three times a day before meals  labetalol 200 milliGRAM(s) Oral three times a day  multivitamin 1 Tablet(s) Oral daily  NIFEdipine XL 90 milliGRAM(s) Oral daily  silver sulfADIAZINE 1% Cream 1 Application(s) Topical daily    MEDICATIONS  (PRN):  acetaminophen   Tablet. 650 milliGRAM(s) Oral every 6 hours PRN Moderate Pain (4 - 6)  benzocaine 15 mG/menthol 3.6 mG Lozenge 1 Lozenge Oral every 2 hours PRN Sore Throat  dextrose Gel 1 Dose(s) Oral once PRN Blood Glucose LESS THAN 70 milliGRAM(s)/deciliter  glucagon  Injectable 1 milliGRAM(s) IntraMuscular once PRN Glucose LESS THAN 70 milligrams/deciliter      ** LABS **                          10.3   5.50  )-----------( 251      ( 28 Mar 2018 05:45 )             33.0                       03-28    141  |  99  |  89<H>  ----------------------------<  99  5.7<H>   |  26  |  4.31<H>    Ca    8.9      28 Mar 2018 05:45  Phos  4.9     03-28  Mg     2.3     03-28    PT/INR - ( 28 Mar 2018 05:45 )   PT: 11.7 SEC;   INR: 1.05     PTT - ( 28 Mar 2018 05:45 )  PTT:36.0 SEC      1. Mitral annular calcification, otherwise normal mitral  valve. Mild mitral regurgitation.  2. Aortic valve leaflet morphology not well visualized.  Peak transaortic valve gradient equals 19 mm Hg, mean  transaortic valve gradient equals 11 mm Hg, consistent with  probable mild aortic stenosis. Minimal aortic  regurgitation.  3. Normal left ventricular internal dimensions and wall  thicknesses.  4. Endocardium not well visualized; grossly normal left  ventricular systolic function.  5. The right ventricle is not well visualized; grossly  normal right ventricular systolic function.  6. Estimated right ventricular systolic pressure equals 57  mm Hg, assuming right atrial pressure equals 10 mm Hg,  consistent with moderate pulmonary hypertension.  7. Bilateral pleural effusions.

## 2018-03-28 NOTE — PROGRESS NOTE ADULT - PROBLEM SELECTOR PLAN 1
Pt. with JANY on CKD in the setting of infection and diarrhea. CKD in the setting of long-standing DM. Scr was 1.5 in 3/2017, increased to 2.30 in 7/2017. Pt. also underwent vascular study/left leg angiogram on 3/8. Scr on admission (2/12) was elevated at 3.81, peaked to 6.3 on 2/23 and Scr stable at 4.3 today. Pt. non-oliguric. Monitor BMP and urine output. Avoid any potential nephrotoxins

## 2018-03-28 NOTE — PROGRESS NOTE ADULT - SUBJECTIVE AND OBJECTIVE BOX
[facility] DIVISION OF KIDNEY DISEASES AND HYPERTENSION -- FOLLOW UP NOTE  --------------------------------------------------------------------------------    HPI: 63-year-old male with PMH of HTN, DM, right BKA, and CKD admitted for left foot infection. As per review of labs on [place]/[employer], Scr was 1.51 in 3/2017. As per PMD's office, Scr checked in 7/2017 was 2.30. Labs on admission (2/12) showed elevated Scr of 3.8 which peaked to 6.3 on 2/23. Pt. underwent vascular study/left leg angiogram on 3/8. Patient seen and examined today. Pt. denies SOB, CP or N/V.     PAST HISTORY  --------------------------------------------------------------------------------  No significant changes to PMH, PSH, FHx, SHx, unless otherwise noted    ALLERGIES & MEDICATIONS  --------------------------------------------------------------------------------  Allergies    No Known Allergies    Intolerances      Standing Inpatient Medications  aspirin enteric coated 81 milliGRAM(s) Oral daily  atorvastatin 80 milliGRAM(s) Oral at bedtime  cyanocobalamin 1000 MICROGram(s) Oral daily  dextrose 5%. 1000 milliLiter(s) IV Continuous <Continuous>  dextrose 50% Injectable 25 Gram(s) IV Push once  dextrose 50% Injectable 25 Gram(s) IV Push once  furosemide    Tablet 60 milliGRAM(s) Oral daily  heparin  Injectable 5000 Unit(s) SubCutaneous every 8 hours  influenza   Vaccine 0.5 milliLiter(s) IntraMuscular once  insulin lispro (HumaLOG) corrective regimen sliding scale   SubCutaneous three times a day before meals  labetalol 200 milliGRAM(s) Oral three times a day  lactated ringers. 1000 milliLiter(s) IV Continuous <Continuous>  multivitamin 1 Tablet(s) Oral daily  NIFEdipine XL 90 milliGRAM(s) Oral daily  silver sulfADIAZINE 1% Cream 1 Application(s) Topical daily    PRN Inpatient Medications  acetaminophen   Tablet. 650 milliGRAM(s) Oral every 6 hours PRN  benzocaine 15 mG/menthol 3.6 mG Lozenge 1 Lozenge Oral every 2 hours PRN  dextrose Gel 1 Dose(s) Oral once PRN  glucagon  Injectable 1 milliGRAM(s) IntraMuscular once PRN      REVIEW OF SYSTEMS  --------------------------------------------------------------------------------  General: no fever  CVS: no chest pain  RESP: no SOB, no cough  ABD: no abdominal pain  : no dysuria  MSK: no edema     All other systems were reviewed and are negative, except as noted.    VITALS/PHYSICAL EXAM  --------------------------------------------------------------------------------  T(C): 36.7 (03-28-18 @ 10:09), Max: 37 (03-28-18 @ 01:21)  HR: 63 (03-28-18 @ 10:09) (62 - 65)  BP: 129/57 (03-28-18 @ 10:09) (122/52 - 156/72)  RR: 18 (03-28-18 @ 10:09) (16 - 19)  SpO2: 99% (03-28-18 @ 10:09) (94% - 99%)  Wt(kg): --    03-27-18 @ 07:01  -  03-28-18 @ 07:00  --------------------------------------------------------  IN: 111 mL / OUT: 600 mL / NET: -489 mL    Physical Exam:              Gen: NAD  	HEENT: No JVD  	Pulm: Decreased breath sounds B/L  	CV: S1S2  	Abd: Soft, +BS  	Ext: no edema, + right BKA              Neuro: Awake, alert   	Skin: Warm and Dry    LABS/STUDIES  --------------------------------------------------------------------------------              10.3   5.50  >-----------<  251      [03-28-18 @ 05:45]              33.0     141  |  99  |  89  ----------------------------<  99      [03-28-18 @ 05:45]  5.7   |  26  |  4.31        Ca     8.9     [03-28-18 @ 05:45]      Mg     2.3     [03-28-18 @ 05:45]      Phos  4.9     [03-28-18 @ 05:45]    PT/INR: PT 11.7 , INR 1.05       [03-28-18 @ 05:45]  PTT: 36.0       [03-28-18 @ 05:45]    Creatinine Trend:  SCr 4.31 [03-28 @ 05:45]  SCr 4.40 [03-27 @ 06:10]  SCr 4.69 [03-26 @ 11:08]  SCr 4.65 [03-26 @ 05:45]  SCr 4.53 [03-25 @ 06:55]    Iron 21, TIBC 163, %sat --      [02-20-18 @ 05:00]  Ferritin 540.7      [02-20-18 @ 05:00]  HbA1c 5.9      [02-13-18 @ 07:11]  TSH 3.22      [03-08-18 @ 06:00] [facility] DIVISION OF KIDNEY DISEASES AND HYPERTENSION -- FOLLOW UP NOTE  --------------------------------------------------------------------------------  HPI: 63-year-old male with PMH of HTN, DM, right BKA, and CKD admitted for left foot infection. As per review of labs on [place]/[employer], Scr was 1.51 in 3/2017. As per PMD's office, Scr checked in 7/2017 was 2.30. Labs on admission (2/12) showed elevated Scr of 3.8 which peaked to 6.3 on 2/23. Pt. underwent vascular study/left leg angiogram on 3/8. Patient seen and examined today. Pt. denies SOB, CP or N/V.     PAST HISTORY  --------------------------------------------------------------------------------  No significant changes to PMH, PSH, FHx, SHx, unless otherwise noted    ALLERGIES & MEDICATIONS  --------------------------------------------------------------------------------  Allergies    No Known Allergies    Intolerances      Standing Inpatient Medications  aspirin enteric coated 81 milliGRAM(s) Oral daily  atorvastatin 80 milliGRAM(s) Oral at bedtime  cyanocobalamin 1000 MICROGram(s) Oral daily  dextrose 5%. 1000 milliLiter(s) IV Continuous <Continuous>  dextrose 50% Injectable 25 Gram(s) IV Push once  dextrose 50% Injectable 25 Gram(s) IV Push once  furosemide    Tablet 60 milliGRAM(s) Oral daily  heparin  Injectable 5000 Unit(s) SubCutaneous every 8 hours  influenza   Vaccine 0.5 milliLiter(s) IntraMuscular once  insulin lispro (HumaLOG) corrective regimen sliding scale   SubCutaneous three times a day before meals  labetalol 200 milliGRAM(s) Oral three times a day  lactated ringers. 1000 milliLiter(s) IV Continuous <Continuous>  multivitamin 1 Tablet(s) Oral daily  NIFEdipine XL 90 milliGRAM(s) Oral daily  silver sulfADIAZINE 1% Cream 1 Application(s) Topical daily    PRN Inpatient Medications  acetaminophen   Tablet. 650 milliGRAM(s) Oral every 6 hours PRN  benzocaine 15 mG/menthol 3.6 mG Lozenge 1 Lozenge Oral every 2 hours PRN  dextrose Gel 1 Dose(s) Oral once PRN  glucagon  Injectable 1 milliGRAM(s) IntraMuscular once PRN    REVIEW OF SYSTEMS  --------------------------------------------------------------------------------  General: no fever  CVS: no chest pain  RESP: no SOB, no cough  ABD: no abdominal pain  : no dysuria  MSK: no edema     All other systems were reviewed and are negative, except as noted.    VITALS/PHYSICAL EXAM  --------------------------------------------------------------------------------  T(C): 36.7 (03-28-18 @ 10:09), Max: 37 (03-28-18 @ 01:21)  HR: 63 (03-28-18 @ 10:09) (62 - 65)  BP: 129/57 (03-28-18 @ 10:09) (122/52 - 156/72)  RR: 18 (03-28-18 @ 10:09) (16 - 19)  SpO2: 99% (03-28-18 @ 10:09) (94% - 99%)  Wt(kg): --    03-27-18 @ 07:01  -  03-28-18 @ 07:00  --------------------------------------------------------  IN: 111 mL / OUT: 600 mL / NET: -489 mL    Physical Exam:              Gen: NAD  	HEENT: No JVD  	Pulm: Decreased breath sounds B/L  	CV: S1S2  	Abd: Soft, +BS  	Ext: no edema, + right BKA              Neuro: Awake, alert   	Skin: Warm and Dry    LABS/STUDIES  --------------------------------------------------------------------------------              10.3   5.50  >-----------<  251      [03-28-18 @ 05:45]              33.0     141  |  99  |  89  ----------------------------<  99      [03-28-18 @ 05:45]  5.7   |  26  |  4.31        Ca     8.9     [03-28-18 @ 05:45]      Mg     2.3     [03-28-18 @ 05:45]      Phos  4.9     [03-28-18 @ 05:45]    Creatinine Trend:  SCr 4.31 [03-28 @ 05:45]  SCr 4.40 [03-27 @ 06:10]  SCr 4.69 [03-26 @ 11:08]  SCr 4.65 [03-26 @ 05:45]  SCr 4.53 [03-25 @ 06:55]

## 2018-03-28 NOTE — PROGRESS NOTE ADULT - PROBLEM SELECTOR PLAN 7
-DVT ppx: HSQ  -Diabetic Diet    Disposition: ultimate dispo to [facility] -DVT ppx: HSQ  -Diabetic Diet; Low potassium     Disposition: ultimate dispo to [facility]

## 2018-03-28 NOTE — PROGRESS NOTE ADULT - ASSESSMENT
64 y/o male with PMH of HTN, DM, right BKA and CKD admitted with left foot infection. Pt. with JNAY on CKD.

## 2018-03-28 NOTE — PROGRESS NOTE ADULT - PROBLEM SELECTOR PLAN 4
Pt with hyperkalemia in the setting of elevated Scr. Pt would benefit from medical management of elevated potassium. Repeat BMP after medical management. Advised low potassium diet Pt. with hyperkalemia in the setting of renal failure. Recommend medical management with Kayexalate. Repeat serum potassium after medical management. Advised low potassium diet

## 2018-03-28 NOTE — PROGRESS NOTE ADULT - PROBLEM SELECTOR PLAN 2
- Creatinine stable CKD IV in the setting of  ATN from sepsis & contrast induced nephropathy   - continuing to monitor urine outputs, trend sCr daily (likely pt's new baseline)  - pt continues to make urine, electrolytes wnl - trend daily  - no indications for HD at this time, will need continued Nephrology follow-up upon dc - Creatinine stable CKD IV in the setting of  ATN from sepsis & contrast induced nephropathy   - continuing to monitor urine outputs, trend sCr daily (likely pt's new baseline)  - pt continues to make urine, electrolytes wnl - trend daily  - no indications for HD at this time, will need continued Nephrology follow-up upon dc  -Mild hyperkalemia today to 5.7; will repeat after dose of lasix and obtain EKG

## 2018-03-28 NOTE — PROGRESS NOTE ADULT - PROBLEM SELECTOR PLAN 1
resolved  Pt had pulmonary edema and significant b/l pleural effusions IV lasix; now greatly improved s/p bumex gtt x3 days   - SCr stable;  patient maintaining euvolemic status.  - c/w PO lasix  - Monitor I's and O's & daily weight  - Tolerates lying supine resolved  Pt had pulmonary edema and significant b/l pleural effusions IV lasix; now greatly improved s/p bumex gtt x3 days   - SCr stable;  patient maintaining euvolemic status.  - c/w PO lasix- consider holding on day of surgery in the setting of high contrast load if potassium normalizes.   - Monitor I's and O's & daily weight  - Tolerates lying supine

## 2018-03-28 NOTE — PROGRESS NOTE ADULT - ATTENDING COMMENTS
Patient was seen and examined personally by me. I have discussed the plan and reviewed the resident's note and agree with the above physical exam findings including assessment and plan except as indicated below.    63 M PMH of DM, HTN, CKD, severe PVD s/p Rt BKA in 2009 who p/w sepsis 2/2 cellulitis and LLE SFA disease, c/b ATN, planned for Lt fem-pop bypass but canceled due to acute hypoxic respiratory failure 2/2 fluid overload, likely 2/2 acute diastolic CHF, now improved with diuresis.    No overnight events.  No CP, SOB, N/V/D.  Able to light flat without dyspnea on room air    Plan for fem pop bypass rescheduled for Thurs, 3/29 by vascular  Continue lasix 60mg daily, creatinine stable and presently appearing euvolemic, c/w incentive spirometry  3/28 CXR personally reviewed: mild interstitial edema, b/l lower lobe atelectasis vs effusion, appears unchanged from prior, agree with extra dose lasix 60mg PO x1 also to help reduce potassium. Avoid IVF given tenuous fluid status and prior fluid overload plus LR has some potassium in it.  Hold diuretics day of surgery and hold short acting insulin day of procedure, patient not on long acting insulin or premeal, FS controlled  Low potassium diet, K: 5.7 noted this AM with repeat 5.3, will give Kayexalate x1  Creatinine stable  Appreciate renal and cardio followup  Transfer to vascular surgery service tmw under Dr. Crump and to be followed by [facility]

## 2018-03-28 NOTE — PROGRESS NOTE ADULT - ASSESSMENT
63M PMHx DM, s/p R BKA, now presenting with Diabetic foot soft tissue infection, w/ angiography showing arterial stenosis of LLE, plan for LLE bypass. Bypass cancelled (3/15) due to dyspnea while supine 2/2 pulmonary effusions. Pulmonary, Nephrology, Medicine, & Cardiology are consulted for optimization of patient. Undergoing aggressive diuresis & fluid management.     -Plan for bypass tomorrow Thursday  3/29/18    #Medically optimized: "RSCI 3; patient is high risk for a moderate risk procedure. At present time and current condition, medically optimized for procedure" (3/28/18 Progress Note)  #Cardiac optimized: "1. Appears euvolemic; 2. No cardiac contraindications to surgery." (3/22/18 Progress Note)    - please f/u on Anesthesia recommendations (Incentive spirometry & repeat CXR/labs) (3/22/18 Chart note)  - please document Medicine, Pulmonary & Cardiac risk stratification for planned bypass procedure  - Strict I&O  - Wound care as per podiatry      Pre-operatively please ensure:  -NPO@Mdn on Wednesday 3/28  -document medical and cardiac optimization, as well as risk stratification  -send Pre-op labs the evening (Wed) prior to procedure: CBC, BMP/Mg/Phos, PT/PTT/INR, T&S  -ensure 12-lead EKG in chart (w/in 48hr)  -CXR in PACS day prior to surgery (ordered & pending)  -adjust insulin regimen to reflect NPO status    Consent completed & placed in front of Chart.       KAREN Reynolds MD (PGY2)    (Please page C team [phone] for questions/concerns.)

## 2018-03-29 LAB
APTT BLD: 30.2 SEC — SIGNIFICANT CHANGE UP (ref 27.5–37.4)
APTT BLD: 43.1 SEC — HIGH (ref 27.5–37.4)
BASE EXCESS BLDA CALC-SCNC: 1.3 MMOL/L — SIGNIFICANT CHANGE UP
BASE EXCESS BLDA CALC-SCNC: 1.9 MMOL/L — SIGNIFICANT CHANGE UP
BASE EXCESS BLDA CALC-SCNC: 2 MMOL/L — SIGNIFICANT CHANGE UP
BASOPHILS # BLD AUTO: 0.08 K/UL — SIGNIFICANT CHANGE UP (ref 0–0.2)
BASOPHILS NFR BLD AUTO: 0.7 % — SIGNIFICANT CHANGE UP (ref 0–2)
BUN SERPL-MCNC: 74 MG/DL — HIGH (ref 7–23)
BUN SERPL-MCNC: 92 MG/DL — HIGH (ref 7–23)
CA-I BLDA-SCNC: 1.16 MMOL/L — SIGNIFICANT CHANGE UP (ref 1.15–1.29)
CA-I BLDA-SCNC: 1.17 MMOL/L — SIGNIFICANT CHANGE UP (ref 1.15–1.29)
CA-I BLDA-SCNC: 1.2 MMOL/L — SIGNIFICANT CHANGE UP (ref 1.15–1.29)
CALCIUM SERPL-MCNC: 8.6 MG/DL — SIGNIFICANT CHANGE UP (ref 8.4–10.5)
CALCIUM SERPL-MCNC: 9.5 MG/DL — SIGNIFICANT CHANGE UP (ref 8.4–10.5)
CHLORIDE SERPL-SCNC: 104 MMOL/L — SIGNIFICANT CHANGE UP (ref 98–107)
CHLORIDE SERPL-SCNC: 111 MMOL/L — HIGH (ref 98–107)
CO2 SERPL-SCNC: 23 MMOL/L — SIGNIFICANT CHANGE UP (ref 22–31)
CO2 SERPL-SCNC: 29 MMOL/L — SIGNIFICANT CHANGE UP (ref 22–31)
CREAT SERPL-MCNC: 3.99 MG/DL — HIGH (ref 0.5–1.3)
CREAT SERPL-MCNC: 4.5 MG/DL — HIGH (ref 0.5–1.3)
EOSINOPHIL # BLD AUTO: 0.18 K/UL — SIGNIFICANT CHANGE UP (ref 0–0.5)
EOSINOPHIL NFR BLD AUTO: 1.6 % — SIGNIFICANT CHANGE UP (ref 0–6)
GLUCOSE BLDA-MCNC: 74 MG/DL — SIGNIFICANT CHANGE UP (ref 70–99)
GLUCOSE BLDA-MCNC: 79 MG/DL — SIGNIFICANT CHANGE UP (ref 70–99)
GLUCOSE BLDA-MCNC: 90 MG/DL — SIGNIFICANT CHANGE UP (ref 70–99)
GLUCOSE BLDC GLUCOMTR-MCNC: 119 MG/DL — HIGH (ref 70–99)
GLUCOSE SERPL-MCNC: 135 MG/DL — HIGH (ref 70–99)
GLUCOSE SERPL-MCNC: 97 MG/DL — SIGNIFICANT CHANGE UP (ref 70–99)
HCO3 BLDA-SCNC: 26 MMOL/L — SIGNIFICANT CHANGE UP (ref 22–26)
HCO3 BLDA-SCNC: 26 MMOL/L — SIGNIFICANT CHANGE UP (ref 22–26)
HCO3 BLDA-SCNC: 27 MMOL/L — HIGH (ref 22–26)
HCT VFR BLD CALC: 29.1 % — LOW (ref 39–50)
HCT VFR BLD CALC: 34.4 % — LOW (ref 39–50)
HCT VFR BLDA CALC: 28.2 % — LOW (ref 39–51)
HCT VFR BLDA CALC: 29.6 % — LOW (ref 39–51)
HCT VFR BLDA CALC: 29.8 % — LOW (ref 39–51)
HGB BLD-MCNC: 10.6 G/DL — LOW (ref 13–17)
HGB BLD-MCNC: 9.1 G/DL — LOW (ref 13–17)
HGB BLDA-MCNC: 9.1 G/DL — LOW (ref 13–17)
HGB BLDA-MCNC: 9.6 G/DL — LOW (ref 13–17)
HGB BLDA-MCNC: 9.6 G/DL — LOW (ref 13–17)
IMM GRANULOCYTES # BLD AUTO: 0.04 # — SIGNIFICANT CHANGE UP
IMM GRANULOCYTES NFR BLD AUTO: 0.4 % — SIGNIFICANT CHANGE UP (ref 0–1.5)
INR BLD: 1.17 — SIGNIFICANT CHANGE UP (ref 0.88–1.17)
LYMPHOCYTES # BLD AUTO: 1.32 K/UL — SIGNIFICANT CHANGE UP (ref 1–3.3)
LYMPHOCYTES # BLD AUTO: 11.9 % — LOW (ref 13–44)
MAGNESIUM SERPL-MCNC: 2.4 MG/DL — SIGNIFICANT CHANGE UP (ref 1.6–2.6)
MCHC RBC-ENTMCNC: 26.7 PG — LOW (ref 27–34)
MCHC RBC-ENTMCNC: 27.2 PG — SIGNIFICANT CHANGE UP (ref 27–34)
MCHC RBC-ENTMCNC: 30.8 % — LOW (ref 32–36)
MCHC RBC-ENTMCNC: 31.3 % — LOW (ref 32–36)
MCV RBC AUTO: 86.6 FL — SIGNIFICANT CHANGE UP (ref 80–100)
MCV RBC AUTO: 87.1 FL — SIGNIFICANT CHANGE UP (ref 80–100)
MONOCYTES # BLD AUTO: 1.11 K/UL — HIGH (ref 0–0.9)
MONOCYTES NFR BLD AUTO: 10 % — SIGNIFICANT CHANGE UP (ref 2–14)
NEUTROPHILS # BLD AUTO: 8.34 K/UL — HIGH (ref 1.8–7.4)
NEUTROPHILS NFR BLD AUTO: 75.4 % — SIGNIFICANT CHANGE UP (ref 43–77)
NRBC # FLD: 0 — SIGNIFICANT CHANGE UP
NRBC # FLD: 0.02 — SIGNIFICANT CHANGE UP
PCO2 BLDA: 33 MMHG — LOW (ref 35–48)
PCO2 BLDA: 40 MMHG — SIGNIFICANT CHANGE UP (ref 35–48)
PCO2 BLDA: 40 MMHG — SIGNIFICANT CHANGE UP (ref 35–48)
PH BLDA: 7.42 PH — SIGNIFICANT CHANGE UP (ref 7.35–7.45)
PH BLDA: 7.43 PH — SIGNIFICANT CHANGE UP (ref 7.35–7.45)
PH BLDA: 7.49 PH — HIGH (ref 7.35–7.45)
PHOSPHATE SERPL-MCNC: 5.2 MG/DL — HIGH (ref 2.5–4.5)
PLATELET # BLD AUTO: 207 K/UL — SIGNIFICANT CHANGE UP (ref 150–400)
PLATELET # BLD AUTO: 260 K/UL — SIGNIFICANT CHANGE UP (ref 150–400)
PMV BLD: 10.8 FL — SIGNIFICANT CHANGE UP (ref 7–13)
PMV BLD: 11.4 FL — SIGNIFICANT CHANGE UP (ref 7–13)
PO2 BLDA: 167 MMHG — HIGH (ref 83–108)
PO2 BLDA: 210 MMHG — HIGH (ref 83–108)
PO2 BLDA: 370 MMHG — HIGH (ref 83–108)
POTASSIUM BLDA-SCNC: 4.1 MMOL/L — SIGNIFICANT CHANGE UP (ref 3.4–4.5)
POTASSIUM BLDA-SCNC: 4.3 MMOL/L — SIGNIFICANT CHANGE UP (ref 3.4–4.5)
POTASSIUM BLDA-SCNC: 4.3 MMOL/L — SIGNIFICANT CHANGE UP (ref 3.4–4.5)
POTASSIUM SERPL-MCNC: 4.7 MMOL/L — SIGNIFICANT CHANGE UP (ref 3.5–5.3)
POTASSIUM SERPL-MCNC: 4.7 MMOL/L — SIGNIFICANT CHANGE UP (ref 3.5–5.3)
POTASSIUM SERPL-SCNC: 4.7 MMOL/L — SIGNIFICANT CHANGE UP (ref 3.5–5.3)
POTASSIUM SERPL-SCNC: 4.7 MMOL/L — SIGNIFICANT CHANGE UP (ref 3.5–5.3)
PROTHROM AB SERPL-ACNC: 13 SEC — SIGNIFICANT CHANGE UP (ref 9.8–13.1)
RBC # BLD: 3.34 M/UL — LOW (ref 4.2–5.8)
RBC # BLD: 3.97 M/UL — LOW (ref 4.2–5.8)
RBC # FLD: 14.4 % — SIGNIFICANT CHANGE UP (ref 10.3–14.5)
RBC # FLD: 14.5 % — SIGNIFICANT CHANGE UP (ref 10.3–14.5)
SAO2 % BLDA: 99.1 % — HIGH (ref 95–99)
SAO2 % BLDA: 99.3 % — HIGH (ref 95–99)
SAO2 % BLDA: 99.8 % — HIGH (ref 95–99)
SODIUM BLDA-SCNC: 143 MMOL/L — SIGNIFICANT CHANGE UP (ref 136–146)
SODIUM BLDA-SCNC: 143 MMOL/L — SIGNIFICANT CHANGE UP (ref 136–146)
SODIUM BLDA-SCNC: 144 MMOL/L — SIGNIFICANT CHANGE UP (ref 136–146)
SODIUM SERPL-SCNC: 145 MMOL/L — SIGNIFICANT CHANGE UP (ref 135–145)
SODIUM SERPL-SCNC: 147 MMOL/L — HIGH (ref 135–145)
WBC # BLD: 11.07 K/UL — HIGH (ref 3.8–10.5)
WBC # BLD: 6.57 K/UL — SIGNIFICANT CHANGE UP (ref 3.8–10.5)
WBC # FLD AUTO: 11.07 K/UL — HIGH (ref 3.8–10.5)
WBC # FLD AUTO: 6.57 K/UL — SIGNIFICANT CHANGE UP (ref 3.8–10.5)

## 2018-03-29 PROCEDURE — 35556 ART BYP GRFT FEM-POPLITEAL: CPT | Mod: LT

## 2018-03-29 PROCEDURE — 99233 SBSQ HOSP IP/OBS HIGH 50: CPT

## 2018-03-29 PROCEDURE — 35571 ART BYP POP-TIBL-PRL-OTHER: CPT | Mod: 59,LT

## 2018-03-29 RX ORDER — HYDROMORPHONE HYDROCHLORIDE 2 MG/ML
0.5 INJECTION INTRAMUSCULAR; INTRAVENOUS; SUBCUTANEOUS
Refills: 0 | Status: DISCONTINUED | OUTPATIENT
Start: 2018-03-29 | End: 2018-03-29

## 2018-03-29 RX ORDER — PENTOXIFYLLINE 400 MG
400 TABLET, EXTENDED RELEASE ORAL THREE TIMES A DAY
Refills: 0 | Status: DISCONTINUED | OUTPATIENT
Start: 2018-03-30 | End: 2018-04-13

## 2018-03-29 RX ORDER — OXYCODONE AND ACETAMINOPHEN 5; 325 MG/1; MG/1
1 TABLET ORAL EVERY 4 HOURS
Refills: 0 | Status: DISCONTINUED | OUTPATIENT
Start: 2018-03-29 | End: 2018-04-05

## 2018-03-29 RX ORDER — ONDANSETRON 8 MG/1
4 TABLET, FILM COATED ORAL ONCE
Refills: 0 | Status: COMPLETED | OUTPATIENT
Start: 2018-03-29 | End: 2018-03-29

## 2018-03-29 RX ORDER — HYDRALAZINE HCL 50 MG
10 TABLET ORAL ONCE
Refills: 0 | Status: COMPLETED | OUTPATIENT
Start: 2018-03-29 | End: 2018-03-29

## 2018-03-29 RX ORDER — SODIUM CHLORIDE 9 MG/ML
1000 INJECTION INTRAMUSCULAR; INTRAVENOUS; SUBCUTANEOUS
Refills: 0 | Status: DISCONTINUED | OUTPATIENT
Start: 2018-03-29 | End: 2018-03-30

## 2018-03-29 RX ORDER — SODIUM CHLORIDE 9 MG/ML
3 INJECTION INTRAMUSCULAR; INTRAVENOUS; SUBCUTANEOUS EVERY 8 HOURS
Refills: 0 | Status: DISCONTINUED | OUTPATIENT
Start: 2018-03-29 | End: 2018-04-09

## 2018-03-29 RX ORDER — PIPERACILLIN AND TAZOBACTAM 4; .5 G/20ML; G/20ML
3.38 INJECTION, POWDER, LYOPHILIZED, FOR SOLUTION INTRAVENOUS EVERY 12 HOURS
Refills: 0 | Status: DISCONTINUED | OUTPATIENT
Start: 2018-03-29 | End: 2018-04-09

## 2018-03-29 RX ORDER — OXYCODONE AND ACETAMINOPHEN 5; 325 MG/1; MG/1
2 TABLET ORAL EVERY 8 HOURS
Refills: 0 | Status: DISCONTINUED | OUTPATIENT
Start: 2018-03-29 | End: 2018-04-05

## 2018-03-29 RX ADMIN — PIPERACILLIN AND TAZOBACTAM 25 GRAM(S): 4; .5 INJECTION, POWDER, LYOPHILIZED, FOR SOLUTION INTRAVENOUS at 18:55

## 2018-03-29 RX ADMIN — Medication 200 MILLIGRAM(S): at 19:56

## 2018-03-29 RX ADMIN — HYDROMORPHONE HYDROCHLORIDE 0.5 MILLIGRAM(S): 2 INJECTION INTRAMUSCULAR; INTRAVENOUS; SUBCUTANEOUS at 16:57

## 2018-03-29 RX ADMIN — SODIUM CHLORIDE 50 MILLILITER(S): 9 INJECTION INTRAMUSCULAR; INTRAVENOUS; SUBCUTANEOUS at 22:57

## 2018-03-29 RX ADMIN — ONDANSETRON 4 MILLIGRAM(S): 8 TABLET, FILM COATED ORAL at 16:57

## 2018-03-29 RX ADMIN — Medication 200 MILLIGRAM(S): at 06:32

## 2018-03-29 RX ADMIN — SODIUM CHLORIDE 3 MILLILITER(S): 9 INJECTION INTRAMUSCULAR; INTRAVENOUS; SUBCUTANEOUS at 21:38

## 2018-03-29 RX ADMIN — ATORVASTATIN CALCIUM 80 MILLIGRAM(S): 80 TABLET, FILM COATED ORAL at 21:46

## 2018-03-29 RX ADMIN — SODIUM CHLORIDE 30 MILLILITER(S): 9 INJECTION INTRAMUSCULAR; INTRAVENOUS; SUBCUTANEOUS at 00:51

## 2018-03-29 RX ADMIN — HYDROMORPHONE HYDROCHLORIDE 0.5 MILLIGRAM(S): 2 INJECTION INTRAMUSCULAR; INTRAVENOUS; SUBCUTANEOUS at 17:18

## 2018-03-29 RX ADMIN — HEPARIN SODIUM 5000 UNIT(S): 5000 INJECTION INTRAVENOUS; SUBCUTANEOUS at 21:46

## 2018-03-29 RX ADMIN — SODIUM CHLORIDE 50 MILLILITER(S): 9 INJECTION INTRAMUSCULAR; INTRAVENOUS; SUBCUTANEOUS at 17:30

## 2018-03-29 RX ADMIN — HEPARIN SODIUM 5000 UNIT(S): 5000 INJECTION INTRAVENOUS; SUBCUTANEOUS at 06:32

## 2018-03-29 RX ADMIN — Medication 60 MILLIGRAM(S): at 19:56

## 2018-03-29 RX ADMIN — Medication 200 MILLIGRAM(S): at 17:10

## 2018-03-29 RX ADMIN — Medication 10 MILLIGRAM(S): at 22:57

## 2018-03-29 RX ADMIN — Medication 90 MILLIGRAM(S): at 06:32

## 2018-03-29 NOTE — PROGRESS NOTE ADULT - PROBLEM SELECTOR PLAN 5
slightly elevated today due to decreased labetalol dose due to mild bradycardia  c/w Nifedipine to 90 mg XL & monitoring BP  Can give Hydralazine 10mg IV q6h prn for SBP > 170

## 2018-03-29 NOTE — PROGRESS NOTE ADULT - PROBLEM SELECTOR PLAN 3
Left foot with cellulitis with resulting sepsis now resolved.  S/p vancomycin and zosyn. Completed Unasyn on 2/22.  S/p angiogram to have fem-pop bypass 3/29  - Consented via surrogate, Eres (patient's brother) & patient agreeable to surgery.

## 2018-03-29 NOTE — PROGRESS NOTE ADULT - SUBJECTIVE AND OBJECTIVE BOX
Patient is a 63y old  Male who presents with a chief complaint of 63M PMH DM, HTN, CKD p/w L foot pain x 3 days. (19 Feb 2018 10:39)     INTERVAL HPI/OVERNIGHT EVENTS:  Patient seen and evaluated at bedside.  Pt is resting comfortable in NAD. Denies N/V/F/C.  Pain rated at 0/10    Allergies    No Known Allergies    Intolerances    Vital Signs Last 24 Hrs  T(C): 36.7 (29 Mar 2018 16:35), Max: 37 (29 Mar 2018 07:58)  T(F): 98.1 (29 Mar 2018 16:35), Max: 98.6 (29 Mar 2018 07:58)  HR: 78 (29 Mar 2018 16:50) (66 - 78)  BP: 167/74 (29 Mar 2018 16:50) (140/56 - 177/66)  BP(mean): --  RR: 14 (29 Mar 2018 16:50) (14 - 20)  SpO2: 100% (29 Mar 2018 16:50) (97% - 100%)    LABS:                        10.6   6.57  )-----------( 260      ( 29 Mar 2018 06:14 )             34.4     03-29    147<H>  |  104  |  92<H>  ----------------------------<  97  4.7   |  29  |  4.50<H>    Ca    9.5      29 Mar 2018 06:14  Phos  5.2     03-29  Mg     2.4     03-29      PT/INR - ( 28 Mar 2018 05:45 )   PT: 11.7 SEC;   INR: 1.05       PTT - ( 29 Mar 2018 06:14 )  PTT:43.1 SEC    CAPILLARY BLOOD GLUCOSE    POCT Blood Glucose.: 91 mg/dL (29 Mar 2018 06:37)  POCT Blood Glucose.: 114 mg/dL (28 Mar 2018 22:58)  POCT Blood Glucose.: 104 mg/dL (28 Mar 2018 20:58)    Lower Extremity Physical Exam:  Vasular: DP/PT 0/4, B/L, CFT <2 seconds B/L, Temperature gradient warm, B/L.   Neuro: Epicritic sensation absent to the level of toes, B/L.  Musculoskeletal/Ortho: RIGHT BKA.  Skin: Left foot deroofed blister with cherry red non-blanchable trophic changes to plantar aspect of toes 1-5, forefoot, midfoot, no ascending erythema or swelling, no malodor, no purulence, no deep probe, etiology unknown, ROM of toes intact, CFT to each toes normal, no sinus tract, no undermining.    RADIOLOGY & ADDITIONAL TESTS:

## 2018-03-29 NOTE — PROGRESS NOTE ADULT - PROBLEM SELECTOR PLAN 1
resolved  due to pulmonary edema and significant b/l pleural effusions from acute diastolic HF   s/p bumex gtt x3 days   now on lasix 60mg  PO qdaily.  Hold lasix today since Na slightly elevated and will be receiving contrast with procedure.

## 2018-03-29 NOTE — PROGRESS NOTE ADULT - ASSESSMENT
63 y.o. Male w/ hx DM, HTN, CKD stage 4, PVD s/p Rt BKA in 2009 p/w sepsis 2/2 cellulitis and LLE SFA disease was to have Lt fem-pop bypass but delayed due to acute hypoxic respiratory failure 2/2 fluid overload from acute diastolic CHF. Acute diastolic HF now resolved after bumex gtt and patient from Left fem-pop bypass today 3/29.

## 2018-03-29 NOTE — PROGRESS NOTE ADULT - PROBLEM SELECTOR PLAN 2
- Creatinine stable CKD IV in the setting of  ATN from sepsis & contrast induced nephropathy    trend creatitine daily   Monitoring urine output  no indications for HD at this time.  F/U nephrology recs

## 2018-03-29 NOTE — PROGRESS NOTE ADULT - ASSESSMENT
LEFT toes, forefoot, midfoot blister (improving).    Plan:  - Pt seen and evaluated  - Appearance of foot continues to improve  - Cont IV abx  - Silvadene to LEFT foot wound daily  - No pod sx intervention at this time  - Will cont to follow.

## 2018-03-29 NOTE — PROGRESS NOTE ADULT - SUBJECTIVE AND OBJECTIVE BOX
Patient is a 63y old  Male who presents with a chief complaint of 63M PMH DM, HTN, CKD p/w L foot pain x 3 days. (19 Feb 2018 10:39)      SUBJECTIVE / OVERNIGHT EVENTS:  Patient has no new complaints. Denies cp, SOB, abdominal pain, N/V/D.    MEDICATIONS  (STANDING):  aspirin enteric coated 81 milliGRAM(s) Oral daily  atorvastatin 80 milliGRAM(s) Oral at bedtime  cyanocobalamin 1000 MICROGram(s) Oral daily  dextrose 5%. 1000 milliLiter(s) (50 mL/Hr) IV Continuous <Continuous>  dextrose 50% Injectable 25 Gram(s) IV Push once  dextrose 50% Injectable 25 Gram(s) IV Push once  furosemide    Tablet 60 milliGRAM(s) Oral daily  heparin  Injectable 5000 Unit(s) SubCutaneous every 8 hours  influenza   Vaccine 0.5 milliLiter(s) IntraMuscular once  insulin lispro (HumaLOG) corrective regimen sliding scale   SubCutaneous three times a day before meals  labetalol 200 milliGRAM(s) Oral three times a day  multivitamin 1 Tablet(s) Oral daily  NIFEdipine XL 90 milliGRAM(s) Oral daily  silver sulfADIAZINE 1% Cream 1 Application(s) Topical daily  sodium chloride 0.9%. 1000 milliLiter(s) (30 mL/Hr) IV Continuous <Continuous>    MEDICATIONS  (PRN):  acetaminophen   Tablet. 650 milliGRAM(s) Oral every 6 hours PRN Moderate Pain (4 - 6)  benzocaine 15 mG/menthol 3.6 mG Lozenge 1 Lozenge Oral every 2 hours PRN Sore Throat  dextrose Gel 1 Dose(s) Oral once PRN Blood Glucose LESS THAN 70 milliGRAM(s)/deciliter  glucagon  Injectable 1 milliGRAM(s) IntraMuscular once PRN Glucose LESS THAN 70 milligrams/deciliter      Vital Signs Last 24 Hrs  T(C): 37 (29 Mar 2018 07:58), Max: 37.2 (28 Mar 2018 16:53)  T(F): 98.6 (29 Mar 2018 07:58), Max: 98.9 (28 Mar 2018 16:53)  HR: 68 (29 Mar 2018 07:58) (63 - 72)  BP: 177/66 (29 Mar 2018 07:58) (129/57 - 177/66)  BP(mean): --  RR: 18 (29 Mar 2018 07:58) (17 - 18)  SpO2: 100% (29 Mar 2018 07:58) (96% - 100%)  CAPILLARY BLOOD GLUCOSE      POCT Blood Glucose.: 91 mg/dL (29 Mar 2018 06:37)  POCT Blood Glucose.: 114 mg/dL (28 Mar 2018 22:58)  POCT Blood Glucose.: 104 mg/dL (28 Mar 2018 20:58)  POCT Blood Glucose.: 130 mg/dL (28 Mar 2018 16:36)  POCT Blood Glucose.: 147 mg/dL (28 Mar 2018 12:11)    I&O's Summary    28 Mar 2018 07:01  -  29 Mar 2018 07:00  --------------------------------------------------------  IN: 450 mL / OUT: 1050 mL / NET: -600 mL        PHYSICAL EXAM:  GENERAL: NAD, well-developed  HEAD:  Atraumatic, Normocephalic  EYES: EOMI, PERRLA, conjunctiva and sclera clear  NECK: Supple, No JVD  CHEST/LUNG: Clear to auscultation bilaterally; No wheeze  HEART: Regular rate and rhythm; No murmurs, rubs, or gallops  ABDOMEN: Soft, Nontender, Nondistended; Bowel sounds present  EXTREMITIES:  s/p R BKA. Left leg wound dressed; unable to palpate DP pulse 2+ Peripheral Pulses, No clubbing, cyanosis, or edema  PSYCH: AAOx3  NEUROLOGY: non-focal  SKIN: No rashes or lesions    LABS:                        10.6   6.57  )-----------( 260      ( 29 Mar 2018 06:14 )             34.4     03-29    147<H>  |  104  |  92<H>  ----------------------------<  97  4.7   |  29  |  4.50<H>    Ca    9.5      29 Mar 2018 06:14  Phos  5.2     03-29  Mg     2.4     03-29      PT/INR - ( 28 Mar 2018 05:45 )   PT: 11.7 SEC;   INR: 1.05          PTT - ( 29 Mar 2018 06:14 )  PTT:43.1 SEC          RADIOLOGY & ADDITIONAL TESTS:    Imaging Personally Reviewed:    Consultant(s) Notes Reviewed:      Care Discussed with Consultants/Other Providers: Vascular surgery

## 2018-03-29 NOTE — PROGRESS NOTE ADULT - PROBLEM SELECTOR PLAN 8
patient assessed as  high risk for a high risk procedure. please see cardiology and medical note on 3/28 for details.

## 2018-03-29 NOTE — BRIEF OPERATIVE NOTE - OPERATION/FINDINGS
LLE angiogram via right groin. Proximal SFA mild stenosis < 50%. Distal SFA long segment disease with behind knee popliteal tight stenosis >80%. AT occluded. PT has 80% stenosis just distal to takeoff but then has flow into foot. Peroneal occluded from mid segment down. 5fr Mynx closure failed, held pressure
left femoral to below knee popliteal artery bypass with left RSVG, jump graft from distal anastomosis to left PT using remaining RSVG

## 2018-03-30 LAB
BASOPHILS # BLD AUTO: 0.03 K/UL — SIGNIFICANT CHANGE UP (ref 0–0.2)
BASOPHILS NFR BLD AUTO: 0.4 % — SIGNIFICANT CHANGE UP (ref 0–2)
BUN SERPL-MCNC: 69 MG/DL — HIGH (ref 7–23)
BUN SERPL-MCNC: 71 MG/DL — HIGH (ref 7–23)
CALCIUM SERPL-MCNC: 8.5 MG/DL — SIGNIFICANT CHANGE UP (ref 8.4–10.5)
CALCIUM SERPL-MCNC: 8.6 MG/DL — SIGNIFICANT CHANGE UP (ref 8.4–10.5)
CHLORIDE SERPL-SCNC: 109 MMOL/L — HIGH (ref 98–107)
CHLORIDE SERPL-SCNC: 111 MMOL/L — HIGH (ref 98–107)
CO2 SERPL-SCNC: 24 MMOL/L — SIGNIFICANT CHANGE UP (ref 22–31)
CO2 SERPL-SCNC: 24 MMOL/L — SIGNIFICANT CHANGE UP (ref 22–31)
CREAT SERPL-MCNC: 3.82 MG/DL — HIGH (ref 0.5–1.3)
CREAT SERPL-MCNC: 4.07 MG/DL — HIGH (ref 0.5–1.3)
EOSINOPHIL # BLD AUTO: 0.01 K/UL — SIGNIFICANT CHANGE UP (ref 0–0.5)
EOSINOPHIL NFR BLD AUTO: 0.1 % — SIGNIFICANT CHANGE UP (ref 0–6)
GLUCOSE BLDC GLUCOMTR-MCNC: 151 MG/DL — HIGH (ref 70–99)
GLUCOSE BLDC GLUCOMTR-MCNC: 190 MG/DL — HIGH (ref 70–99)
GLUCOSE BLDC GLUCOMTR-MCNC: 190 MG/DL — HIGH (ref 70–99)
GLUCOSE BLDC GLUCOMTR-MCNC: 220 MG/DL — HIGH (ref 70–99)
GLUCOSE SERPL-MCNC: 153 MG/DL — HIGH (ref 70–99)
GLUCOSE SERPL-MCNC: 165 MG/DL — HIGH (ref 70–99)
HCT VFR BLD CALC: 28.5 % — LOW (ref 39–50)
HGB BLD-MCNC: 8.9 G/DL — LOW (ref 13–17)
IMM GRANULOCYTES # BLD AUTO: 0.04 # — SIGNIFICANT CHANGE UP
IMM GRANULOCYTES NFR BLD AUTO: 0.5 % — SIGNIFICANT CHANGE UP (ref 0–1.5)
LYMPHOCYTES # BLD AUTO: 0.99 K/UL — LOW (ref 1–3.3)
LYMPHOCYTES # BLD AUTO: 12.4 % — LOW (ref 13–44)
MAGNESIUM SERPL-MCNC: 1.9 MG/DL — SIGNIFICANT CHANGE UP (ref 1.6–2.6)
MCHC RBC-ENTMCNC: 27.4 PG — SIGNIFICANT CHANGE UP (ref 27–34)
MCHC RBC-ENTMCNC: 31.2 % — LOW (ref 32–36)
MCV RBC AUTO: 87.7 FL — SIGNIFICANT CHANGE UP (ref 80–100)
MONOCYTES # BLD AUTO: 0.7 K/UL — SIGNIFICANT CHANGE UP (ref 0–0.9)
MONOCYTES NFR BLD AUTO: 8.8 % — SIGNIFICANT CHANGE UP (ref 2–14)
NEUTROPHILS # BLD AUTO: 6.2 K/UL — SIGNIFICANT CHANGE UP (ref 1.8–7.4)
NEUTROPHILS NFR BLD AUTO: 77.8 % — HIGH (ref 43–77)
NRBC # FLD: 0 — SIGNIFICANT CHANGE UP
PHOSPHATE SERPL-MCNC: 5.2 MG/DL — HIGH (ref 2.5–4.5)
PLATELET # BLD AUTO: 218 K/UL — SIGNIFICANT CHANGE UP (ref 150–400)
PMV BLD: 11.5 FL — SIGNIFICANT CHANGE UP (ref 7–13)
POTASSIUM SERPL-MCNC: 5.1 MMOL/L — SIGNIFICANT CHANGE UP (ref 3.5–5.3)
POTASSIUM SERPL-MCNC: 5.5 MMOL/L — HIGH (ref 3.5–5.3)
POTASSIUM SERPL-SCNC: 5.1 MMOL/L — SIGNIFICANT CHANGE UP (ref 3.5–5.3)
POTASSIUM SERPL-SCNC: 5.5 MMOL/L — HIGH (ref 3.5–5.3)
RBC # BLD: 3.25 M/UL — LOW (ref 4.2–5.8)
RBC # FLD: 14.7 % — HIGH (ref 10.3–14.5)
SODIUM SERPL-SCNC: 144 MMOL/L — SIGNIFICANT CHANGE UP (ref 135–145)
SODIUM SERPL-SCNC: 147 MMOL/L — HIGH (ref 135–145)
WBC # BLD: 7.97 K/UL — SIGNIFICANT CHANGE UP (ref 3.8–10.5)
WBC # FLD AUTO: 7.97 K/UL — SIGNIFICANT CHANGE UP (ref 3.8–10.5)

## 2018-03-30 PROCEDURE — 99233 SBSQ HOSP IP/OBS HIGH 50: CPT

## 2018-03-30 PROCEDURE — 99232 SBSQ HOSP IP/OBS MODERATE 35: CPT | Mod: GC

## 2018-03-30 RX ORDER — HYDRALAZINE HCL 50 MG
10 TABLET ORAL ONCE
Refills: 0 | Status: COMPLETED | OUTPATIENT
Start: 2018-03-30 | End: 2018-03-30

## 2018-03-30 RX ORDER — SIMETHICONE 80 MG/1
80 TABLET, CHEWABLE ORAL ONCE
Refills: 0 | Status: COMPLETED | OUTPATIENT
Start: 2018-03-30 | End: 2018-03-30

## 2018-03-30 RX ADMIN — PIPERACILLIN AND TAZOBACTAM 25 GRAM(S): 4; .5 INJECTION, POWDER, LYOPHILIZED, FOR SOLUTION INTRAVENOUS at 18:23

## 2018-03-30 RX ADMIN — PIPERACILLIN AND TAZOBACTAM 25 GRAM(S): 4; .5 INJECTION, POWDER, LYOPHILIZED, FOR SOLUTION INTRAVENOUS at 05:59

## 2018-03-30 RX ADMIN — Medication 2: at 13:25

## 2018-03-30 RX ADMIN — SIMETHICONE 80 MILLIGRAM(S): 80 TABLET, CHEWABLE ORAL at 22:24

## 2018-03-30 RX ADMIN — PREGABALIN 1000 MICROGRAM(S): 225 CAPSULE ORAL at 13:27

## 2018-03-30 RX ADMIN — HEPARIN SODIUM 5000 UNIT(S): 5000 INJECTION INTRAVENOUS; SUBCUTANEOUS at 22:28

## 2018-03-30 RX ADMIN — SODIUM CHLORIDE 3 MILLILITER(S): 9 INJECTION INTRAMUSCULAR; INTRAVENOUS; SUBCUTANEOUS at 22:21

## 2018-03-30 RX ADMIN — HEPARIN SODIUM 5000 UNIT(S): 5000 INJECTION INTRAVENOUS; SUBCUTANEOUS at 05:54

## 2018-03-30 RX ADMIN — Medication 400 MILLIGRAM(S): at 06:10

## 2018-03-30 RX ADMIN — Medication 10 MILLIGRAM(S): at 02:09

## 2018-03-30 RX ADMIN — Medication 400 MILLIGRAM(S): at 22:24

## 2018-03-30 RX ADMIN — Medication 90 MILLIGRAM(S): at 06:10

## 2018-03-30 RX ADMIN — OXYCODONE AND ACETAMINOPHEN 2 TABLET(S): 5; 325 TABLET ORAL at 08:01

## 2018-03-30 RX ADMIN — Medication 60 MILLIGRAM(S): at 05:54

## 2018-03-30 RX ADMIN — Medication 81 MILLIGRAM(S): at 13:27

## 2018-03-30 RX ADMIN — ATORVASTATIN CALCIUM 80 MILLIGRAM(S): 80 TABLET, FILM COATED ORAL at 22:24

## 2018-03-30 RX ADMIN — SODIUM CHLORIDE 3 MILLILITER(S): 9 INJECTION INTRAMUSCULAR; INTRAVENOUS; SUBCUTANEOUS at 06:00

## 2018-03-30 RX ADMIN — HEPARIN SODIUM 5000 UNIT(S): 5000 INJECTION INTRAVENOUS; SUBCUTANEOUS at 13:28

## 2018-03-30 RX ADMIN — Medication 1 TABLET(S): at 13:27

## 2018-03-30 RX ADMIN — Medication 650 MILLIGRAM(S): at 14:20

## 2018-03-30 RX ADMIN — Medication 400 MILLIGRAM(S): at 13:28

## 2018-03-30 RX ADMIN — Medication 200 MILLIGRAM(S): at 05:54

## 2018-03-30 RX ADMIN — Medication 1 APPLICATION(S): at 06:00

## 2018-03-30 RX ADMIN — OXYCODONE AND ACETAMINOPHEN 2 TABLET(S): 5; 325 TABLET ORAL at 08:40

## 2018-03-30 RX ADMIN — Medication 200 MILLIGRAM(S): at 13:41

## 2018-03-30 RX ADMIN — Medication 1: at 18:23

## 2018-03-30 RX ADMIN — SODIUM CHLORIDE 3 MILLILITER(S): 9 INJECTION INTRAMUSCULAR; INTRAVENOUS; SUBCUTANEOUS at 13:28

## 2018-03-30 RX ADMIN — Medication 1: at 09:36

## 2018-03-30 RX ADMIN — Medication 200 MILLIGRAM(S): at 22:24

## 2018-03-30 RX ADMIN — Medication 650 MILLIGRAM(S): at 13:40

## 2018-03-30 NOTE — PROGRESS NOTE ADULT - PROBLEM SELECTOR PLAN 1
Pt. with JANY on CKD in the setting of infection and diarrhea. CKD in the setting of long-standing DM. Scr was 1.5 in 3/2017, increased to 2.30 in 7/2017. Pt. also underwent vascular study/left leg angiogram on 3/8. Pt had Left LE bypass on 3/29. Scr on admission (2/12) was elevated at 3.81, peaked to 6.3 on 2/23 and Scr improved to 3.82 today. Pt. non-oliguric. Monitor BMP and urine output. Avoid any potential nephrotoxins Pt. with JANY on CKD in the setting of infection and diarrhea. CKD in the setting of long-standing DM. Scr was 1.5 in 3/2017, increased to 2.30 in 7/2017. Pt. also underwent vascular study/left leg angiogram on 3/8. Scr on admission (2/12) was elevated at 3.81, peaked to 6.3 on 2/23 and improved to 3.82 today. Pt. non-oliguric. Monitor BMP and urine output. Avoid any potential nephrotoxins

## 2018-03-30 NOTE — CHART NOTE - NSCHARTNOTEFT_GEN_A_CORE
Source: Patient [x]    Family [ ]     other [x] Patient seen for length of stay nutrition follow-up. Medical record reviewed. Patient sleepy at time of visit, provided limited information. Spoke to RN. Per medical record, s/p left femoral to below knee popliteal artery bypass with left RSVG, jump graft from distal anastomosis to left PT using remaining RSVG (3/29). RN reports patient consumed Clear Liquids for breakfast. Patient reports fair appetite/PO intake. During previous nutrition follow-up (3/19) patient c/o swallowing difficulty. Patient did not report chewing/swallowing difficulty today. No GI distress (nausea/vomiting/diarrhea/constipation.)     Diet : Consistent Carbohydrate Renal w/ evening snack, No concentrated Potassium  PO intake:  < 50% [ ] 50-75% [x]   % [ ]  other :    Current Weight: 3/28: 135# (61.4kg) <- 147# (66kg) (3/24) <- 175# (79.9kg) (3/16) <- 165# (75kg) (2/14) - Weights are variable in-house, ?true dry weight loss, patient ordered for lasix at this time so possible fluid shifts. Also noted with 2+ left leg edema per flowsheets. Will monitor.    Pertinent Medications: MEDICATIONS  (STANDING):  aspirin enteric coated 81 milliGRAM(s) Oral daily  atorvastatin 80 milliGRAM(s) Oral at bedtime  cyanocobalamin 1000 MICROGram(s) Oral daily  dextrose 5%. 1000 milliLiter(s) (50 mL/Hr) IV Continuous <Continuous>  dextrose 50% Injectable 25 Gram(s) IV Push once  dextrose 50% Injectable 25 Gram(s) IV Push once  furosemide    Tablet 60 milliGRAM(s) Oral daily  heparin  Injectable 5000 Unit(s) SubCutaneous every 8 hours  influenza   Vaccine 0.5 milliLiter(s) IntraMuscular once  insulin lispro (HumaLOG) corrective regimen sliding scale   SubCutaneous three times a day before meals  labetalol 200 milliGRAM(s) Oral three times a day  multivitamin 1 Tablet(s) Oral daily  NIFEdipine XL 90 milliGRAM(s) Oral daily  pentoxifylline 400 milliGRAM(s) Oral three times a day  piperacillin/tazobactam IVPB. 3.375 Gram(s) IV Intermittent every 12 hours  silver sulfADIAZINE 1% Cream 1 Application(s) Topical daily  sodium chloride 0.9% lock flush 3 milliLiter(s) IV Push every 8 hours    MEDICATIONS  (PRN):  acetaminophen   Tablet. 650 milliGRAM(s) Oral every 6 hours PRN Moderate Pain (4 - 6)  benzocaine 15 mG/menthol 3.6 mG Lozenge 1 Lozenge Oral every 2 hours PRN Sore Throat  dextrose Gel 1 Dose(s) Oral once PRN Blood Glucose LESS THAN 70 milliGRAM(s)/deciliter  glucagon  Injectable 1 milliGRAM(s) IntraMuscular once PRN Glucose LESS THAN 70 milligrams/deciliter  oxyCODONE    5 mG/acetaminophen 325 mG 2 Tablet(s) Oral every 8 hours PRN Severe Pain (7 - 10)  oxyCODONE    5 mG/acetaminophen 325 mG 1 Tablet(s) Oral every 4 hours PRN Moderate Pain    Pertinent Labs:  03-30 Na147 mmol/L<H> Glu 165 mg/dL<H> K+ 5.1 mmol/L Cr  4.07 mg/dL<H> BUN 69 mg/dL<H> 03-30 Phos 5.2 mg/dL<H>  CAPILLARY BLOOD GLUCOSE  POCT Blood Glucose.: 190 mg/dL (30 Mar 2018 09:31)  POCT Blood Glucose.: 119 mg/dL (29 Mar 2018 22:23)    Skin: left plantar foot wound     Estimated Needs:   [x] no change since previous assessment  [ ] recalculated:     Interventions:   1. Recommend to change diet to Mechanical Soft, Consistent Carbohydrate Renal w/ evening snack, Low potassium.   2. Recommend Nepro 1x daily (425 kcals, 19.1g protein).   3. Monitor need for swallow assessment if patient reports swallowing difficulty.   4. Continue multivitamin and b12 supplementation.  5. Please Encourage po intake, assist with meals and menu selections, provide alternatives PRN.     Monitoring and Evaluation:   1. Monitor weights, labs, BM's, skin integrity, p.o. intake.   2. Patient to meet > 75% estimated pro/kcal needs.   3. Maintain weight.   4. Maintain blood glucose control.   RD to remain available, Michelle Asfhord RD, CDN

## 2018-03-30 NOTE — PROGRESS NOTE ADULT - SUBJECTIVE AND OBJECTIVE BOX
[facility] GENERAL SURGERY POST-OP NOTE    SUBJECTIVE: Pt seen and evaluated at bedside. Resting comfortably in bed. Pain controlled. Denies nausea/vomiting, CP, palpitations, SOB, lightheaded, dizziness. Tolerating some PO intake.     Objective:  Gen: NAD  Pulm: No work on breathing  Card: RRR  Abd: soft, nt/nd  Groin: L grion CDI; no rubor/swelling noted  Ext: L ext wrapped in ace bandage; cdi. L foot wrapped  Vasc: L PT signal     Vital Signs Last 24 Hrs  T(C): 36.4 (29 Mar 2018 22:27), Max: 37 (29 Mar 2018 07:58)  T(F): 97.6 (29 Mar 2018 22:27), Max: 98.6 (29 Mar 2018 07:58)  HR: 73 (29 Mar 2018 23:54) (57 - 78)  BP: 156/66 (29 Mar 2018 23:54) (133/63 - 179/74)  BP(mean): --  RR: 18 (29 Mar 2018 22:27) (10 - 22)  SpO2: 97% (29 Mar 2018 22:27) (97% - 100%)  I&O's Summary    28 Mar 2018 07:01  -  29 Mar 2018 07:00  --------------------------------------------------------  IN: 450 mL / OUT: 1050 mL / NET: -600 mL    29 Mar 2018 07:01  -  30 Mar 2018 01:21  --------------------------------------------------------  IN: 450 mL / OUT: 335 mL / NET: 115 mL      I&O's Detail    28 Mar 2018 07:01  -  29 Mar 2018 07:00  --------------------------------------------------------  IN:    Oral Fluid: 450 mL  Total IN: 450 mL    OUT:    Emesis: 300 mL    Voided: 750 mL  Total OUT: 1050 mL    Total NET: -600 mL      29 Mar 2018 07:01  -  30 Mar 2018 01:21  --------------------------------------------------------  IN:    Oral Fluid: 200 mL    sodium chloride 0.9%.: 250 mL  Total IN: 450 mL    OUT:    Indwelling Catheter - Urethral: 335 mL  Total OUT: 335 mL    Total NET: 115 mL          MEDICATIONS  (STANDING):  aspirin enteric coated 81 milliGRAM(s) Oral daily  atorvastatin 80 milliGRAM(s) Oral at bedtime  cyanocobalamin 1000 MICROGram(s) Oral daily  dextrose 5%. 1000 milliLiter(s) (50 mL/Hr) IV Continuous <Continuous>  dextrose 50% Injectable 25 Gram(s) IV Push once  dextrose 50% Injectable 25 Gram(s) IV Push once  furosemide    Tablet 60 milliGRAM(s) Oral daily  heparin  Injectable 5000 Unit(s) SubCutaneous every 8 hours  influenza   Vaccine 0.5 milliLiter(s) IntraMuscular once  insulin lispro (HumaLOG) corrective regimen sliding scale   SubCutaneous three times a day before meals  labetalol 200 milliGRAM(s) Oral three times a day  multivitamin 1 Tablet(s) Oral daily  NIFEdipine XL 90 milliGRAM(s) Oral daily  pentoxifylline 400 milliGRAM(s) Oral three times a day  piperacillin/tazobactam IVPB. 3.375 Gram(s) IV Intermittent every 12 hours  silver sulfADIAZINE 1% Cream 1 Application(s) Topical daily  sodium chloride 0.9% lock flush 3 milliLiter(s) IV Push every 8 hours  sodium chloride 0.9%. 1000 milliLiter(s) (50 mL/Hr) IV Continuous <Continuous>    MEDICATIONS  (PRN):  acetaminophen   Tablet. 650 milliGRAM(s) Oral every 6 hours PRN Moderate Pain (4 - 6)  benzocaine 15 mG/menthol 3.6 mG Lozenge 1 Lozenge Oral every 2 hours PRN Sore Throat  dextrose Gel 1 Dose(s) Oral once PRN Blood Glucose LESS THAN 70 milliGRAM(s)/deciliter  glucagon  Injectable 1 milliGRAM(s) IntraMuscular once PRN Glucose LESS THAN 70 milligrams/deciliter  oxyCODONE    5 mG/acetaminophen 325 mG 2 Tablet(s) Oral every 8 hours PRN Severe Pain (7 - 10)  oxyCODONE    5 mG/acetaminophen 325 mG 1 Tablet(s) Oral every 4 hours PRN Moderate Pain      LABS:                        9.1    11.07 )-----------( 207      ( 29 Mar 2018 17:10 )             29.1     03-29    145  |  111<H>  |  74<H>  ----------------------------<  135<H>  4.7   |  23  |  3.99<H>    Ca    8.6      29 Mar 2018 17:10  Phos  5.2     03-29  Mg     2.4     03-29      PT/INR - ( 29 Mar 2018 17:10 )   PT: 13.0 SEC;   INR: 1.17          PTT - ( 29 Mar 2018 17:10 )  PTT:30.2 SEC      RADIOLOGY & ADDITIONAL STUDIES:

## 2018-03-30 NOTE — PROGRESS NOTE ADULT - ASSESSMENT
63yoM h/o DM, CVA in 1990s, HTN, CKD stage 3, R BKA in 2009 p/w 3 day h/o worsening left foot pain. s/p  [id number]-yipbeocba angiography and Left leg angiography showing Ostial left anterior tibial:  100% stenosis. Proximal left anterior tibial: 100 % stenosis. Mid left anterior tibial: 100 % stenosis. Distal left anterior tibial: 100 % stenosis. 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 Plan for femoral popliteal bypass. Given 3 units blood transfusion now ADHF (likely diastolic) with bilateral large pleural effusions. Patient improved s/p diuresis and now s/p fem pop bypass. Patient appears euvolemic post op. Call with questions.

## 2018-03-30 NOTE — PROGRESS NOTE ADULT - ASSESSMENT
LEFT toes, forefoot, midfoot blister (improving).    Plan:  - Pt seen and evaluated  - Appearance of foot continues to improve  - Cont IV abx  - Silvadene to LEFT foot wound daily  - Spoke with Dr. Crump about this case.  We will discuss possible future debridement with the patient depending on the appearance of the plantar foot.  - Will cont to follow.

## 2018-03-30 NOTE — PROGRESS NOTE ADULT - PROBLEM SELECTOR PLAN 3
Left foot with cellulitis with resulting sepsis now resolved.  S/p vancomycin and zosyn. Completed Unasyn on 2/22.

## 2018-03-30 NOTE — PROGRESS NOTE ADULT - SUBJECTIVE AND OBJECTIVE BOX
ANESTHESIA POSTOP CHECK    63y Male POSTOP DAY 1 S/P left femoral popliteal bypass    Vital Signs Last 24 Hrs  T(C): 36.8 (30 Mar 2018 05:43), Max: 36.8 (30 Mar 2018 05:43)  T(F): 98.2 (30 Mar 2018 05:43), Max: 98.2 (30 Mar 2018 05:43)  HR: 80 (30 Mar 2018 05:43) (57 - 81)  BP: 157/64 (30 Mar 2018 05:43) (133/63 - 180/72)  BP(mean): --  RR: 16 (30 Mar 2018 05:43) (10 - 22)  SpO2: 95% (30 Mar 2018 05:43) (95% - 100%)  I&O's Summary    29 Mar 2018 07:01  -  30 Mar 2018 07:00  --------------------------------------------------------  IN: 1050 mL / OUT: 635 mL / NET: 415 mL    30 Mar 2018 07:01  -  30 Mar 2018 09:53  --------------------------------------------------------  IN: 50 mL / OUT: 0 mL / NET: 50 mL        [x ] NO APPARENT ANESTHESIA COMPLICATIONS

## 2018-03-30 NOTE — PROGRESS NOTE ADULT - ASSESSMENT
63 y.o. Male w/ hx DM, HTN, CKD stage 4, PVD s/p Rt BKA in 2009 p/w sepsis 2/2 cellulitis and LLE SFA disease was to have Lt fem-pop bypass but delayed due to acute hypoxic respiratory failure 2/2 fluid overload from acute diastolic CHF. Acute diastolic HF now resolved after bumex gtt now s/p  left femoral to below knee popliteal artery bypass with left RSVG, jump graft from distal anastomosis to left PT using remaining RSVG on 3/29

## 2018-03-30 NOTE — PROGRESS NOTE ADULT - SUBJECTIVE AND OBJECTIVE BOX
[facility] DIVISION OF KIDNEY DISEASES AND HYPERTENSION -- FOLLOW UP NOTE  --------------------------------------------------------------------------------    HPI: 63-year-old male with PMH of HTN, DM, right BKA, and CKD admitted for left foot infection. As per review of labs on [facility]/[employer], Scr was 1.51 in 3/2017. As per PMD's office, Scr checked in 7/2017 was 2.30. Labs on admission (2/12) showed elevated Scr of 3.8 which peaked to 6.3 on 2/23. Pt. underwent vascular study/left leg angiogram on 3/8. Pt s/p Left LE bypass on 3/29. Patient seen and examined today. Pt. denies SOB, CP or N/V.     PAST HISTORY  --------------------------------------------------------------------------------  No significant changes to PMH, PSH, FHx, SHx, unless otherwise noted    ALLERGIES & MEDICATIONS  --------------------------------------------------------------------------------  Allergies    No Known Allergies    Intolerances      Standing Inpatient Medications  aspirin enteric coated 81 milliGRAM(s) Oral daily  atorvastatin 80 milliGRAM(s) Oral at bedtime  cyanocobalamin 1000 MICROGram(s) Oral daily  dextrose 5%. 1000 milliLiter(s) IV Continuous <Continuous>  dextrose 50% Injectable 25 Gram(s) IV Push once  dextrose 50% Injectable 25 Gram(s) IV Push once  furosemide    Tablet 60 milliGRAM(s) Oral daily  heparin  Injectable 5000 Unit(s) SubCutaneous every 8 hours  influenza   Vaccine 0.5 milliLiter(s) IntraMuscular once  insulin lispro (HumaLOG) corrective regimen sliding scale   SubCutaneous three times a day before meals  labetalol 200 milliGRAM(s) Oral three times a day  multivitamin 1 Tablet(s) Oral daily  NIFEdipine XL 90 milliGRAM(s) Oral daily  pentoxifylline 400 milliGRAM(s) Oral three times a day  piperacillin/tazobactam IVPB. 3.375 Gram(s) IV Intermittent every 12 hours  silver sulfADIAZINE 1% Cream 1 Application(s) Topical daily  sodium chloride 0.9% lock flush 3 milliLiter(s) IV Push every 8 hours    PRN Inpatient Medications  acetaminophen   Tablet. 650 milliGRAM(s) Oral every 6 hours PRN  benzocaine 15 mG/menthol 3.6 mG Lozenge 1 Lozenge Oral every 2 hours PRN  dextrose Gel 1 Dose(s) Oral once PRN  glucagon  Injectable 1 milliGRAM(s) IntraMuscular once PRN  oxyCODONE    5 mG/acetaminophen 325 mG 2 Tablet(s) Oral every 8 hours PRN  oxyCODONE    5 mG/acetaminophen 325 mG 1 Tablet(s) Oral every 4 hours PRN      REVIEW OF SYSTEMS  --------------------------------------------------------------------------------  General: no fever  CVS: no chest pain  RESP: no SOB, no cough  ABD: no abdominal pain  : no dysuria  MSK: no edema   All other systems were reviewed and are negative, except as noted.    VITALS/PHYSICAL EXAM  --------------------------------------------------------------------------------  T(C): 36.8 (03-30-18 @ 05:43), Max: 36.8 (03-30-18 @ 05:43)  HR: 80 (03-30-18 @ 05:43) (57 - 81)  BP: 157/64 (03-30-18 @ 05:43) (133/63 - 180/72)  RR: 16 (03-30-18 @ 05:43) (10 - 22)  SpO2: 95% (03-30-18 @ 05:43) (95% - 100%)  Wt(kg): --        03-29-18 @ 07:01  -  03-30-18 @ 07:00  --------------------------------------------------------  IN: 1050 mL / OUT: 635 mL / NET: 415 mL    03-30-18 @ 07:01  -  03-30-18 @ 09:55  --------------------------------------------------------  IN: 50 mL / OUT: 0 mL / NET: 50 mL    Physical Exam:              Gen: NAD  	HEENT: No JVD  	Pulm: Fair air entry B/L  	CV: S1S2  	Abd: Soft, +BS  	Ext: no edema, + right BKA, Left LE wrapped with ACE              Neuro: Awake, alert   	Skin: Warm and Dry    LABS/STUDIES  --------------------------------------------------------------------------------              8.9    7.97  >-----------<  218      [03-30-18 @ 05:55]              28.5     144  |  109  |  71  ----------------------------<  153      [03-30-18 @ 05:55]  5.5   |  24  |  3.82        Ca     8.5     [03-30-18 @ 05:55]      Mg     2.4     [03-29-18 @ 06:14]      Phos  5.2     [03-29-18 @ 06:14]    PT/INR: PT 13.0 , INR 1.17       [03-29-18 @ 17:10]  PTT: 30.2       [03-29-18 @ 17:10]    Creatinine Trend:  SCr 3.82 [03-30 @ 05:55]  SCr 3.99 [03-29 @ 17:10]  SCr 4.50 [03-29 @ 06:14]  SCr 4.37 [03-28 @ 12:48]  SCr 4.31 [03-28 @ 05:45]    Iron 21, TIBC 163, %sat --      [02-20-18 @ 05:00]  Ferritin 540.7      [02-20-18 @ 05:00]  HbA1c 5.9      [02-13-18 @ 07:11]  TSH 3.22      [03-08-18 @ 06:00] [facility] DIVISION OF KIDNEY DISEASES AND HYPERTENSION -- FOLLOW UP NOTE  --------------------------------------------------------------------------------  HPI: 63-year-old male with PMH of HTN, DM, right BKA, and CKD admitted for left foot infection. As per review of labs on [facility], Scr was 1.51 in 3/2017. As per PMD's office, Scr checked in 7/2017 was 2.30. Labs on admission (2/12) showed elevated Scr of 3.8 which peaked to 6.3 on 2/23. Pt. underwent vascular study/left leg angiogram on 3/8. Pt. underwent LLE bypass yesterday (3/29). Patient seen and examined today. Pt. denies SOB, CP or N/V.     PAST HISTORY  --------------------------------------------------------------------------------  No significant changes to PMH, PSH, FHx, SHx, unless otherwise noted    ALLERGIES & MEDICATIONS  --------------------------------------------------------------------------------  Allergies    No Known Allergies    Intolerances    Standing Inpatient Medications  aspirin enteric coated 81 milliGRAM(s) Oral daily  atorvastatin 80 milliGRAM(s) Oral at bedtime  cyanocobalamin 1000 MICROGram(s) Oral daily  dextrose 5%. 1000 milliLiter(s) IV Continuous <Continuous>  dextrose 50% Injectable 25 Gram(s) IV Push once  dextrose 50% Injectable 25 Gram(s) IV Push once  furosemide    Tablet 60 milliGRAM(s) Oral daily  heparin  Injectable 5000 Unit(s) SubCutaneous every 8 hours  influenza   Vaccine 0.5 milliLiter(s) IntraMuscular once  insulin lispro (HumaLOG) corrective regimen sliding scale   SubCutaneous three times a day before meals  labetalol 200 milliGRAM(s) Oral three times a day  multivitamin 1 Tablet(s) Oral daily  NIFEdipine XL 90 milliGRAM(s) Oral daily  pentoxifylline 400 milliGRAM(s) Oral three times a day  piperacillin/tazobactam IVPB. 3.375 Gram(s) IV Intermittent every 12 hours  silver sulfADIAZINE 1% Cream 1 Application(s) Topical daily  sodium chloride 0.9% lock flush 3 milliLiter(s) IV Push every 8 hours    REVIEW OF SYSTEMS  --------------------------------------------------------------------------------  General: no fever  CVS: no chest pain  RESP: no SOB, no cough  ABD: no abdominal pain  : no dysuria  MSK: See [facility]       VITALS/PHYSICAL EXAM  --------------------------------------------------------------------------------  T(C): 36.8 (03-30-18 @ 05:43), Max: 36.8 (03-30-18 @ 05:43)  HR: 80 (03-30-18 @ 05:43) (57 - 81)  BP: 157/64 (03-30-18 @ 05:43) (133/63 - 180/72)  RR: 16 (03-30-18 @ 05:43) (10 - 22)  SpO2: 95% (03-30-18 @ 05:43) (95% - 100%)  Wt(kg): --    03-29-18 @ 07:01  -  03-30-18 @ 07:00  --------------------------------------------------------  IN: 1050 mL / OUT: 635 mL / NET: 415 mL    03-30-18 @ 07:01  -  03-30-18 @ 09:55  --------------------------------------------------------  IN: 50 mL / OUT: 0 mL / NET: 50 mL    Physical Exam:              Gen: NAD  	HEENT: No JVD  	Pulm: Fair air entry B/L  	CV: S1S2  	Abd: Soft, +BS  	Ext: + right BKA, LLE wrapped with ACE bandage/dressing              Neuro: Awake, alert   	Skin: Warm and Dry    LABS/STUDIES  --------------------------------------------------------------------------------              8.9    7.97  >-----------<  218      [03-30-18 @ 05:55]              28.5     144  |  109  |  71  ----------------------------<  153      [03-30-18 @ 05:55]  5.5   |  24  |  3.82        Ca     8.5     [03-30-18 @ 05:55]      Mg     2.4     [03-29-18 @ 06:14]      Phos  5.2     [03-29-18 @ 06:14]    Creatinine Trend:  SCr 3.82 [03-30 @ 05:55]  SCr 3.99 [03-29 @ 17:10]  SCr 4.50 [03-29 @ 06:14]  SCr 4.37 [03-28 @ 12:48]  SCr 4.31 [03-28 @ 05:45]

## 2018-03-30 NOTE — PROGRESS NOTE ADULT - SUBJECTIVE AND OBJECTIVE BOX
Patient is a 63y old  Male who presents with a chief complaint of 63M PMH DM, HTN, CKD p/w L foot pain x 3 days. (19 Feb 2018 10:39)       INTERVAL HPI/OVERNIGHT EVENTS:  Patient seen and evaluated at bedside.  Pt is resting comfortable in NAD. Denies N/V/F/C.  Pain rated at X/10    Allergies    No Known Allergies    Intolerances        Vital Signs Last 24 Hrs  T(C): 36.8 (30 Mar 2018 05:43), Max: 36.8 (30 Mar 2018 05:43)  T(F): 98.2 (30 Mar 2018 05:43), Max: 98.2 (30 Mar 2018 05:43)  HR: 80 (30 Mar 2018 05:43) (57 - 81)  BP: 157/64 (30 Mar 2018 05:43) (133/63 - 180/72)  BP(mean): --  RR: 16 (30 Mar 2018 05:43) (10 - 22)  SpO2: 95% (30 Mar 2018 05:43) (95% - 100%)    LABS:                        8.9    7.97  )-----------( 218      ( 30 Mar 2018 05:55 )             28.5     03-30    144  |  109<H>  |  71<H>  ----------------------------<  153<H>  5.5<H>   |  24  |  3.82<H>    Ca    8.5      30 Mar 2018 05:55  Phos  5.2     03-29  Mg     2.4     03-29      PT/INR - ( 29 Mar 2018 17:10 )   PT: 13.0 SEC;   INR: 1.17          PTT - ( 29 Mar 2018 17:10 )  PTT:30.2 SEC    CAPILLARY BLOOD GLUCOSE      POCT Blood Glucose.: 119 mg/dL (29 Mar 2018 22:23)      Lower Extremity Physical Exam:  Vasular: DP/PT 0/4, B/L, CFT <2 seconds B/L, Temperature gradient warm, B/L.   Neuro: Epicritic sensation absent to the level of toes, B/L.  Musculoskeletal/Ortho: RIGHT BKA.  Skin: Left foot deroofed blister with cherry red non-blanchable trophic changes to plantar aspect of toes 1-5, forefoot, midfoot, no ascending erythema or swelling, no malodor, no purulence, no deep probe, etiology unknown, ROM of toes intact, CFT to each toes normal, no sinus tract, no undermining.    RADIOLOGY & ADDITIONAL TESTS:

## 2018-03-30 NOTE — PROGRESS NOTE ADULT - SUBJECTIVE AND OBJECTIVE BOX
GENERAL SURGERY DAILY PROGRESS NOTE:       Subjective:  Pain controlled. AVSS overnight. Pt hypertensive overnight.         Objective:    PE:  Gen: NAD  Ext: L ext wrapped in ace bandage; c/d/i, s/p R BKA, L palpable PT pulse      Vital Signs Last 24 Hrs  T(C): 36.6 (30 Mar 2018 10:13), Max: 36.8 (30 Mar 2018 05:43)  T(F): 97.8 (30 Mar 2018 10:13), Max: 98.2 (30 Mar 2018 05:43)  HR: 73 (30 Mar 2018 10:13) (57 - 81)  BP: 124/54 (30 Mar 2018 10:13) (124/54 - 180/72)  BP(mean): --  RR: 17 (30 Mar 2018 10:13) (10 - 22)  SpO2: 95% (30 Mar 2018 10:13) (95% - 100%)    I&O's Detail    29 Mar 2018 07:01  -  30 Mar 2018 07:00  --------------------------------------------------------  IN:    IV PiggyBack: 100 mL    Oral Fluid: 200 mL    sodium chloride 0.9%: 750 mL  Total IN: 1050 mL    OUT:    Indwelling Catheter - Urethral: 635 mL  Total OUT: 635 mL    Total NET: 415 mL      30 Mar 2018 07:01  -  30 Mar 2018 13:37  --------------------------------------------------------  IN:    sodium chloride 0.9%: 50 mL  Total IN: 50 mL    OUT:    Voided: 650 mL  Total OUT: 650 mL    Total NET: -600 mL          Daily     Daily     MEDICATIONS  (STANDING):  aspirin enteric coated 81 milliGRAM(s) Oral daily  atorvastatin 80 milliGRAM(s) Oral at bedtime  cyanocobalamin 1000 MICROGram(s) Oral daily  dextrose 50% Injectable 25 Gram(s) IV Push once  dextrose 50% Injectable 25 Gram(s) IV Push once  furosemide    Tablet 60 milliGRAM(s) Oral daily  heparin  Injectable 5000 Unit(s) SubCutaneous every 8 hours  influenza   Vaccine 0.5 milliLiter(s) IntraMuscular once  insulin lispro (HumaLOG) corrective regimen sliding scale   SubCutaneous three times a day before meals  labetalol 200 milliGRAM(s) Oral three times a day  multivitamin 1 Tablet(s) Oral daily  NIFEdipine XL 90 milliGRAM(s) Oral daily  pentoxifylline 400 milliGRAM(s) Oral three times a day  piperacillin/tazobactam IVPB. 3.375 Gram(s) IV Intermittent every 12 hours  silver sulfADIAZINE 1% Cream 1 Application(s) Topical daily  sodium chloride 0.9% lock flush 3 milliLiter(s) IV Push every 8 hours    MEDICATIONS  (PRN):  acetaminophen   Tablet. 650 milliGRAM(s) Oral every 6 hours PRN Moderate Pain (4 - 6)  benzocaine 15 mG/menthol 3.6 mG Lozenge 1 Lozenge Oral every 2 hours PRN Sore Throat  dextrose Gel 1 Dose(s) Oral once PRN Blood Glucose LESS THAN 70 milliGRAM(s)/deciliter  glucagon  Injectable 1 milliGRAM(s) IntraMuscular once PRN Glucose LESS THAN 70 milligrams/deciliter  oxyCODONE    5 mG/acetaminophen 325 mG 2 Tablet(s) Oral every 8 hours PRN Severe Pain (7 - 10)  oxyCODONE    5 mG/acetaminophen 325 mG 1 Tablet(s) Oral every 4 hours PRN Moderate Pain      LABS:                        8.9    7.97  )-----------( 218      ( 30 Mar 2018 05:55 )             28.5     03-30    147<H>  |  111<H>  |  69<H>  ----------------------------<  165<H>  5.1   |  24  |  4.07<H>    Ca    8.6      30 Mar 2018 10:00  Phos  5.2     03-30  Mg     1.9     03-30      PT/INR - ( 29 Mar 2018 17:10 )   PT: 13.0 SEC;   INR: 1.17          PTT - ( 29 Mar 2018 17:10 )  PTT:30.2 SEC

## 2018-03-30 NOTE — PROGRESS NOTE ADULT - SUBJECTIVE AND OBJECTIVE BOX
Overnight Events:   No CP or SOB  Patient s/p fem-pop bypass    Medications:  acetaminophen   Tablet. 650 milliGRAM(s) Oral every 6 hours PRN  aspirin enteric coated 81 milliGRAM(s) Oral daily  atorvastatin 80 milliGRAM(s) Oral at bedtime  benzocaine 15 mG/menthol 3.6 mG Lozenge 1 Lozenge Oral every 2 hours PRN  cyanocobalamin 1000 MICROGram(s) Oral daily  dextrose 50% Injectable 25 Gram(s) IV Push once  dextrose 50% Injectable 25 Gram(s) IV Push once  dextrose Gel 1 Dose(s) Oral once PRN  furosemide    Tablet 60 milliGRAM(s) Oral daily  glucagon  Injectable 1 milliGRAM(s) IntraMuscular once PRN  heparin  Injectable 5000 Unit(s) SubCutaneous every 8 hours  influenza   Vaccine 0.5 milliLiter(s) IntraMuscular once  insulin lispro (HumaLOG) corrective regimen sliding scale   SubCutaneous three times a day before meals  labetalol 200 milliGRAM(s) Oral three times a day  multivitamin 1 Tablet(s) Oral daily  NIFEdipine XL 90 milliGRAM(s) Oral daily  oxyCODONE    5 mG/acetaminophen 325 mG 2 Tablet(s) Oral every 8 hours PRN  oxyCODONE    5 mG/acetaminophen 325 mG 1 Tablet(s) Oral every 4 hours PRN  pentoxifylline 400 milliGRAM(s) Oral three times a day  piperacillin/tazobactam IVPB. 3.375 Gram(s) IV Intermittent every 12 hours  silver sulfADIAZINE 1% Cream 1 Application(s) Topical daily  sodium chloride 0.9% lock flush 3 milliLiter(s) IV Push every 8 hours      PAST MEDICAL & SURGICAL HISTORY:  Kidney disease  HTN (hypertension)  DM (diabetes mellitus)  S/P BKA (below knee amputation) unilateral, right      Vitals:  T(F): 97.8 (03-30), Max: 98.2 (03-30)  HR: 77 (03-30) (57 - 81)  BP: 123/57 (03-30) (123/57 - 180/72)  RR: 17 (03-30)  SpO2: 95% (03-30)  I&O's Summary    29 Mar 2018 07:01  -  30 Mar 2018 07:00  --------------------------------------------------------  IN: 1050 mL / OUT: 635 mL / NET: 415 mL    30 Mar 2018 07:01  -  30 Mar 2018 16:07  --------------------------------------------------------  IN: 50 mL / OUT: 950 mL / NET: -900 mL        Physical Exam:  Appearance: No acute distress  Eyes: PERRL, EOMI  HENT: Normal oral muscosa  Cardiovascular: RRR, S1, S2, no murmurss; no edema; no JVD  Respiratory: Clear to auscultation bilaterally  Gastrointestinal: soft, non-tender  Neurologic: Non-focal  Psychiatry: AAOx3, mood & affect appropriate  Skin: No rashes, ecchymoses, or cyanosis                          8.9    7.97  )-----------( 218      ( 30 Mar 2018 05:55 )             28.5     03-30    147<H>  |  111<H>  |  69<H>  ----------------------------<  165<H>  5.1   |  24  |  4.07<H>    Ca    8.6      30 Mar 2018 10:00  Phos  5.2     03-30  Mg     1.9     03-30      PT/INR - ( 29 Mar 2018 17:10 )   PT: 13.0 SEC;   INR: 1.17          PTT - ( 29 Mar 2018 17:10 )  PTT:30.2 SEC

## 2018-03-30 NOTE — PROGRESS NOTE ADULT - ASSESSMENT
63y Male s/p left femoral to below knee popliteal artery bypass with left RSVG, jump graft from distal anastomosis to left PT using remaining RSVG.    - advance to renal diet today  - IV lock  - Pain control  - Strict I/O's  - keep cervantes  - DVT ppx

## 2018-03-30 NOTE — PROGRESS NOTE ADULT - PROBLEM SELECTOR PLAN 1
resolved  due to pulmonary edema and significant b/l pleural effusions from acute diastolic HF   s/p bumex gtt x3 days   c/w lasix 60mg  PO qdaily.

## 2018-03-30 NOTE — PROGRESS NOTE ADULT - SUBJECTIVE AND OBJECTIVE BOX
Patient is a 63y old  Male who presents with a chief complaint of 63M PMH DM, HTN, CKD p/w L foot pain x 3 days. (19 Feb 2018 10:39)      SUBJECTIVE / OVERNIGHT EVENTS:  Patient has no new complaints. Denies cp, SOB, abdominal pain, N/V/D     MEDICATIONS  (STANDING):  aspirin enteric coated 81 milliGRAM(s) Oral daily  atorvastatin 80 milliGRAM(s) Oral at bedtime  cyanocobalamin 1000 MICROGram(s) Oral daily  dextrose 50% Injectable 25 Gram(s) IV Push once  dextrose 50% Injectable 25 Gram(s) IV Push once  furosemide    Tablet 60 milliGRAM(s) Oral daily  heparin  Injectable 5000 Unit(s) SubCutaneous every 8 hours  influenza   Vaccine 0.5 milliLiter(s) IntraMuscular once  insulin lispro (HumaLOG) corrective regimen sliding scale   SubCutaneous three times a day before meals  labetalol 200 milliGRAM(s) Oral three times a day  multivitamin 1 Tablet(s) Oral daily  NIFEdipine XL 90 milliGRAM(s) Oral daily  pentoxifylline 400 milliGRAM(s) Oral three times a day  piperacillin/tazobactam IVPB. 3.375 Gram(s) IV Intermittent every 12 hours  silver sulfADIAZINE 1% Cream 1 Application(s) Topical daily  sodium chloride 0.9% lock flush 3 milliLiter(s) IV Push every 8 hours    MEDICATIONS  (PRN):  acetaminophen   Tablet. 650 milliGRAM(s) Oral every 6 hours PRN Moderate Pain (4 - 6)  benzocaine 15 mG/menthol 3.6 mG Lozenge 1 Lozenge Oral every 2 hours PRN Sore Throat  dextrose Gel 1 Dose(s) Oral once PRN Blood Glucose LESS THAN 70 milliGRAM(s)/deciliter  glucagon  Injectable 1 milliGRAM(s) IntraMuscular once PRN Glucose LESS THAN 70 milligrams/deciliter  oxyCODONE    5 mG/acetaminophen 325 mG 2 Tablet(s) Oral every 8 hours PRN Severe Pain (7 - 10)  oxyCODONE    5 mG/acetaminophen 325 mG 1 Tablet(s) Oral every 4 hours PRN Moderate Pain      Vital Signs Last 24 Hrs  T(C): 36.6 (30 Mar 2018 10:13), Max: 36.8 (30 Mar 2018 05:43)  T(F): 97.8 (30 Mar 2018 10:13), Max: 98.2 (30 Mar 2018 05:43)  HR: 77 (30 Mar 2018 13:38) (57 - 81)  BP: 123/57 (30 Mar 2018 13:38) (123/57 - 180/72)  BP(mean): --  RR: 17 (30 Mar 2018 10:13) (10 - 22)  SpO2: 95% (30 Mar 2018 10:13) (95% - 100%)  CAPILLARY BLOOD GLUCOSE      POCT Blood Glucose.: 220 mg/dL (30 Mar 2018 13:19)  POCT Blood Glucose.: 190 mg/dL (30 Mar 2018 09:31)  POCT Blood Glucose.: 119 mg/dL (29 Mar 2018 22:23)    I&O's Summary    29 Mar 2018 07:01  -  30 Mar 2018 07:00  --------------------------------------------------------  IN: 1050 mL / OUT: 635 mL / NET: 415 mL    30 Mar 2018 07:01  -  30 Mar 2018 13:44  --------------------------------------------------------  IN: 50 mL / OUT: 650 mL / NET: -600 mL        PHYSICAL EXAM:   GENERAL: NAD, well-developed  HEAD:  Atraumatic, Normocephalic  EYES: EOMI, PERRLA, conjunctiva and sclera clear  NECK: Supple, No JVD  CHEST/LUNG: Clear to auscultation bilaterally; No wheeze  HEART: Regular rate and rhythm; No murmurs, rubs, or gallops  ABDOMEN: Soft, Nontender, Nondistended; Bowel sounds present  EXTREMITIES: : + right BKA, LLE wrapped with ACE bandage/dressing   PSYCH: AAOx3  NEUROLOGY: non-focal  SKIN: No rashes or lesions    LABS:                        8.9    7.97  )-----------( 218      ( 30 Mar 2018 05:55 )             28.5     03-30    147<H>  |  111<H>  |  69<H>  ----------------------------<  165<H>  5.1   |  24  |  4.07<H>    Ca    8.6      30 Mar 2018 10:00  Phos  5.2     03-30  Mg     1.9     03-30      PT/INR - ( 29 Mar 2018 17:10 )   PT: 13.0 SEC;   INR: 1.17          PTT - ( 29 Mar 2018 17:10 )  PTT:30.2 SEC          RADIOLOGY & ADDITIONAL TESTS:    Imaging Personally Reviewed:    Consultant(s) Notes Reviewed:      Care Discussed with Consultants/Other Providers: vascular Sx

## 2018-03-30 NOTE — PROGRESS NOTE ADULT - ASSESSMENT
63M PMHx DM, s/p R BKA and now s/p  lt fem pop rgsv and lt pop a to pta rgsv byp doing well     pain rx  continue iv abx  remove ace wrap in am   d/w podiatry to allo left forefoot to reperf and to proceed in sev days w lt foot surg poss tma for sepsis control

## 2018-03-30 NOTE — PROGRESS NOTE ADULT - PROBLEM SELECTOR PLAN 4
Pt. with hyperkalemia in the setting of renal failure. BMP currently hemolyzed. Advise to repeat today. Recommend low potassium diet

## 2018-03-30 NOTE — PROGRESS NOTE ADULT - SUBJECTIVE AND OBJECTIVE BOX
Patient is a 63y old  Male who presents with a chief complaint of 63M PMH DM, HTN, CKD p/w L foot pain x 3 days. (19 Feb 2018 10:39)      Vascular Surgery Attending Progress Note    Interval HPI: pt w/o c/o     Medications:  acetaminophen   Tablet. 650 milliGRAM(s) Oral every 6 hours PRN  aspirin enteric coated 81 milliGRAM(s) Oral daily  atorvastatin 80 milliGRAM(s) Oral at bedtime  benzocaine 15 mG/menthol 3.6 mG Lozenge 1 Lozenge Oral every 2 hours PRN  cyanocobalamin 1000 MICROGram(s) Oral daily  dextrose 50% Injectable 25 Gram(s) IV Push once  dextrose 50% Injectable 25 Gram(s) IV Push once  dextrose Gel 1 Dose(s) Oral once PRN  furosemide    Tablet 60 milliGRAM(s) Oral daily  glucagon  Injectable 1 milliGRAM(s) IntraMuscular once PRN  heparin  Injectable 5000 Unit(s) SubCutaneous every 8 hours  influenza   Vaccine 0.5 milliLiter(s) IntraMuscular once  insulin lispro (HumaLOG) corrective regimen sliding scale   SubCutaneous three times a day before meals  labetalol 200 milliGRAM(s) Oral three times a day  multivitamin 1 Tablet(s) Oral daily  NIFEdipine XL 90 milliGRAM(s) Oral daily  oxyCODONE    5 mG/acetaminophen 325 mG 2 Tablet(s) Oral every 8 hours PRN  oxyCODONE    5 mG/acetaminophen 325 mG 1 Tablet(s) Oral every 4 hours PRN  pentoxifylline 400 milliGRAM(s) Oral three times a day  piperacillin/tazobactam IVPB. 3.375 Gram(s) IV Intermittent every 12 hours  silver sulfADIAZINE 1% Cream 1 Application(s) Topical daily  sodium chloride 0.9% lock flush 3 milliLiter(s) IV Push every 8 hours      Vital Signs Last 24 Hrs  T(C): 37.1 (30 Mar 2018 17:45), Max: 37.1 (30 Mar 2018 17:45)  T(F): 98.8 (30 Mar 2018 17:45), Max: 98.8 (30 Mar 2018 17:45)  HR: 75 (30 Mar 2018 17:45) (66 - 81)  BP: 130/55 (30 Mar 2018 17:45) (123/57 - 180/72)  BP(mean): --  RR: 16 (30 Mar 2018 17:45) (10 - 20)  SpO2: 95% (30 Mar 2018 17:45) (95% - 100%)  I&O's Summary    29 Mar 2018 07:01  -  30 Mar 2018 07:00  --------------------------------------------------------  IN: 1050 mL / OUT: 635 mL / NET: 415 mL    30 Mar 2018 07:01  -  30 Mar 2018 18:58  --------------------------------------------------------  IN: 50 mL / OUT: 950 mL / NET: -900 mL        Physical Exam:  Neuro  A&Ox3 VSS  Vascular:   lle and foot warm and well perfused   Musculoskeletal: wnl    LABS:                        8.9    7.97  )-----------( 218      ( 30 Mar 2018 05:55 )             28.5     03-30    147<H>  |  111<H>  |  69<H>  ----------------------------<  165<H>  5.1   |  24  |  4.07<H>    Ca    8.6      30 Mar 2018 10:00  Phos  5.2     03-30  Mg     1.9     03-30      PT/INR - ( 29 Mar 2018 17:10 )   PT: 13.0 SEC;   INR: 1.17          PTT - ( 29 Mar 2018 17:10 )  PTT:30.2 SEC    BARI NICOLAS MD  [phone]

## 2018-03-30 NOTE — PROGRESS NOTE ADULT - PROBLEM SELECTOR PLAN 2
- Creatitine stable CKD IV in the setting of  ATN from sepsis & contrast induced nephropathy    trend creatitine daily   Monitoring urine output  no indications for HD at this time.  F/U nephrology recs

## 2018-03-30 NOTE — PROGRESS NOTE ADULT - PROBLEM SELECTOR PLAN 4
s/p angiogram with multi vessel disease s/p LE dopplers & vein mapping  KENN .71 on left.   s/p Femoral-popliteal bypass 3/29.   POD#1  management as per vascular  c/w ASA and statin

## 2018-03-30 NOTE — PROGRESS NOTE ADULT - PROBLEM SELECTOR PLAN 2
Pt with hypervolemia in the setting of CKD. Pt. appears clinically stable. Continue with current dose of oral diuretic therapy. Monitor weight daily and low salt diet Pt. with mild hyperkalemia on labs done today however blood was hemolyzed. Advise to repeat serum potassium. Low potassium diet

## 2018-03-30 NOTE — PROGRESS NOTE ADULT - ASSESSMENT
63y Male s/p left femoral to below knee popliteal artery bypass with left RSVG, jump graft from distal anastomosis to left PT using remaining RSVG, stable:  - CLD  - Pain control  - Strict I/O's  - F/u pulse exam  - DVT ppx  - AM labs

## 2018-03-31 LAB
BUN SERPL-MCNC: 76 MG/DL — HIGH (ref 7–23)
CALCIUM SERPL-MCNC: 8.2 MG/DL — LOW (ref 8.4–10.5)
CHLORIDE SERPL-SCNC: 110 MMOL/L — HIGH (ref 98–107)
CO2 SERPL-SCNC: 24 MMOL/L — SIGNIFICANT CHANGE UP (ref 22–31)
CREAT SERPL-MCNC: 4.41 MG/DL — HIGH (ref 0.5–1.3)
GLUCOSE BLDC GLUCOMTR-MCNC: 153 MG/DL — HIGH (ref 70–99)
GLUCOSE BLDC GLUCOMTR-MCNC: 187 MG/DL — HIGH (ref 70–99)
GLUCOSE BLDC GLUCOMTR-MCNC: 225 MG/DL — HIGH (ref 70–99)
GLUCOSE BLDC GLUCOMTR-MCNC: 243 MG/DL — HIGH (ref 70–99)
GLUCOSE SERPL-MCNC: 156 MG/DL — HIGH (ref 70–99)
HCT VFR BLD CALC: 24.1 % — LOW (ref 39–50)
HGB BLD-MCNC: 7.5 G/DL — LOW (ref 13–17)
MAGNESIUM SERPL-MCNC: 1.9 MG/DL — SIGNIFICANT CHANGE UP (ref 1.6–2.6)
MCHC RBC-ENTMCNC: 27.4 PG — SIGNIFICANT CHANGE UP (ref 27–34)
MCHC RBC-ENTMCNC: 31.1 % — LOW (ref 32–36)
MCV RBC AUTO: 88 FL — SIGNIFICANT CHANGE UP (ref 80–100)
NRBC # FLD: 0 — SIGNIFICANT CHANGE UP
PHOSPHATE SERPL-MCNC: 4.5 MG/DL — SIGNIFICANT CHANGE UP (ref 2.5–4.5)
PLATELET # BLD AUTO: 202 K/UL — SIGNIFICANT CHANGE UP (ref 150–400)
PMV BLD: 11.5 FL — SIGNIFICANT CHANGE UP (ref 7–13)
POTASSIUM SERPL-MCNC: 4.7 MMOL/L — SIGNIFICANT CHANGE UP (ref 3.5–5.3)
POTASSIUM SERPL-SCNC: 4.7 MMOL/L — SIGNIFICANT CHANGE UP (ref 3.5–5.3)
RBC # BLD: 2.74 M/UL — LOW (ref 4.2–5.8)
RBC # FLD: 15.1 % — HIGH (ref 10.3–14.5)
SODIUM SERPL-SCNC: 146 MMOL/L — HIGH (ref 135–145)
WBC # BLD: 10.77 K/UL — HIGH (ref 3.8–10.5)
WBC # FLD AUTO: 10.77 K/UL — HIGH (ref 3.8–10.5)

## 2018-03-31 PROCEDURE — 71045 X-RAY EXAM CHEST 1 VIEW: CPT | Mod: 26

## 2018-03-31 PROCEDURE — 93010 ELECTROCARDIOGRAM REPORT: CPT

## 2018-03-31 PROCEDURE — 99233 SBSQ HOSP IP/OBS HIGH 50: CPT

## 2018-03-31 RX ORDER — SODIUM CHLORIDE 9 MG/ML
500 INJECTION INTRAMUSCULAR; INTRAVENOUS; SUBCUTANEOUS ONCE
Refills: 0 | Status: COMPLETED | OUTPATIENT
Start: 2018-03-31 | End: 2018-03-31

## 2018-03-31 RX ORDER — PANTOPRAZOLE SODIUM 20 MG/1
40 TABLET, DELAYED RELEASE ORAL ONCE
Refills: 0 | Status: COMPLETED | OUTPATIENT
Start: 2018-03-31 | End: 2018-03-31

## 2018-03-31 RX ORDER — PANTOPRAZOLE SODIUM 20 MG/1
40 TABLET, DELAYED RELEASE ORAL
Refills: 0 | Status: DISCONTINUED | OUTPATIENT
Start: 2018-03-31 | End: 2018-03-31

## 2018-03-31 RX ADMIN — Medication 200 MILLIGRAM(S): at 05:18

## 2018-03-31 RX ADMIN — PANTOPRAZOLE SODIUM 40 MILLIGRAM(S): 20 TABLET, DELAYED RELEASE ORAL at 02:38

## 2018-03-31 RX ADMIN — Medication 90 MILLIGRAM(S): at 05:18

## 2018-03-31 RX ADMIN — SODIUM CHLORIDE 3 MILLILITER(S): 9 INJECTION INTRAMUSCULAR; INTRAVENOUS; SUBCUTANEOUS at 22:35

## 2018-03-31 RX ADMIN — HEPARIN SODIUM 5000 UNIT(S): 5000 INJECTION INTRAVENOUS; SUBCUTANEOUS at 22:36

## 2018-03-31 RX ADMIN — Medication 2: at 13:12

## 2018-03-31 RX ADMIN — SODIUM CHLORIDE 500 MILLILITER(S): 9 INJECTION INTRAMUSCULAR; INTRAVENOUS; SUBCUTANEOUS at 19:15

## 2018-03-31 RX ADMIN — Medication 400 MILLIGRAM(S): at 05:18

## 2018-03-31 RX ADMIN — PREGABALIN 1000 MICROGRAM(S): 225 CAPSULE ORAL at 13:22

## 2018-03-31 RX ADMIN — Medication 81 MILLIGRAM(S): at 13:22

## 2018-03-31 RX ADMIN — PIPERACILLIN AND TAZOBACTAM 25 GRAM(S): 4; .5 INJECTION, POWDER, LYOPHILIZED, FOR SOLUTION INTRAVENOUS at 05:12

## 2018-03-31 RX ADMIN — SODIUM CHLORIDE 3 MILLILITER(S): 9 INJECTION INTRAMUSCULAR; INTRAVENOUS; SUBCUTANEOUS at 05:21

## 2018-03-31 RX ADMIN — Medication 60 MILLIGRAM(S): at 05:18

## 2018-03-31 RX ADMIN — HEPARIN SODIUM 5000 UNIT(S): 5000 INJECTION INTRAVENOUS; SUBCUTANEOUS at 05:12

## 2018-03-31 RX ADMIN — Medication 1 APPLICATION(S): at 05:19

## 2018-03-31 RX ADMIN — SODIUM CHLORIDE 3 MILLILITER(S): 9 INJECTION INTRAMUSCULAR; INTRAVENOUS; SUBCUTANEOUS at 13:22

## 2018-03-31 RX ADMIN — HEPARIN SODIUM 5000 UNIT(S): 5000 INJECTION INTRAVENOUS; SUBCUTANEOUS at 13:22

## 2018-03-31 RX ADMIN — Medication 400 MILLIGRAM(S): at 22:35

## 2018-03-31 RX ADMIN — Medication 400 MILLIGRAM(S): at 13:22

## 2018-03-31 RX ADMIN — Medication 2: at 18:07

## 2018-03-31 RX ADMIN — Medication 1: at 09:24

## 2018-03-31 RX ADMIN — Medication 200 MILLIGRAM(S): at 22:35

## 2018-03-31 RX ADMIN — Medication 1 TABLET(S): at 13:22

## 2018-03-31 RX ADMIN — Medication 200 MILLIGRAM(S): at 13:22

## 2018-03-31 RX ADMIN — ATORVASTATIN CALCIUM 80 MILLIGRAM(S): 80 TABLET, FILM COATED ORAL at 22:35

## 2018-03-31 RX ADMIN — PIPERACILLIN AND TAZOBACTAM 25 GRAM(S): 4; .5 INJECTION, POWDER, LYOPHILIZED, FOR SOLUTION INTRAVENOUS at 18:08

## 2018-03-31 NOTE — PROGRESS NOTE ADULT - PROBLEM SELECTOR PLAN 4
s/p angiogram with multi vessel disease s/p LE dopplers & vein mapping  KENN .71 on left.   s/p Femoral-popliteal bypass 3/29.   POD#2  management as per vascular  c/w ASA and statin

## 2018-03-31 NOTE — PROGRESS NOTE ADULT - SUBJECTIVE AND OBJECTIVE BOX
pt seen at bedside in NAD. dressing to left foot c/d/i s/p bypass LLE  plantar foot wound noted slightly moist plantar toes wounds dry and stable well adhered. \  applied DSD  will dsc SSD and start dakins with DSD daily  will need to closely monitor may require debridement or TMA if wounds become infected

## 2018-03-31 NOTE — PROGRESS NOTE ADULT - SUBJECTIVE AND OBJECTIVE BOX
Patient is a 63y old  Male who presents with a chief complaint of 63M PMH DM, HTN, CKD p/w L foot pain x 3 days. (19 Feb 2018 10:39)        SUBJECTIVE / OVERNIGHT EVENTS: Breathing significantly improved per patient. No complaint, other than desire to have cervantes discontinued.      MEDICATIONS  (STANDING):  aspirin enteric coated 81 milliGRAM(s) Oral daily  atorvastatin 80 milliGRAM(s) Oral at bedtime  cyanocobalamin 1000 MICROGram(s) Oral daily  dextrose 50% Injectable 25 Gram(s) IV Push once  dextrose 50% Injectable 25 Gram(s) IV Push once  furosemide    Tablet 60 milliGRAM(s) Oral daily  heparin  Injectable 5000 Unit(s) SubCutaneous every 8 hours  influenza   Vaccine 0.5 milliLiter(s) IntraMuscular once  insulin lispro (HumaLOG) corrective regimen sliding scale   SubCutaneous three times a day before meals  labetalol 200 milliGRAM(s) Oral three times a day  multivitamin 1 Tablet(s) Oral daily  NIFEdipine XL 90 milliGRAM(s) Oral daily  pentoxifylline 400 milliGRAM(s) Oral three times a day  piperacillin/tazobactam IVPB. 3.375 Gram(s) IV Intermittent every 12 hours  silver sulfADIAZINE 1% Cream 1 Application(s) Topical daily  sodium chloride 0.9% lock flush 3 milliLiter(s) IV Push every 8 hours    MEDICATIONS  (PRN):  acetaminophen   Tablet. 650 milliGRAM(s) Oral every 6 hours PRN Moderate Pain (4 - 6)  benzocaine 15 mG/menthol 3.6 mG Lozenge 1 Lozenge Oral every 2 hours PRN Sore Throat  dextrose Gel 1 Dose(s) Oral once PRN Blood Glucose LESS THAN 70 milliGRAM(s)/deciliter  glucagon  Injectable 1 milliGRAM(s) IntraMuscular once PRN Glucose LESS THAN 70 milligrams/deciliter  oxyCODONE    5 mG/acetaminophen 325 mG 2 Tablet(s) Oral every 8 hours PRN Severe Pain (7 - 10)  oxyCODONE    5 mG/acetaminophen 325 mG 1 Tablet(s) Oral every 4 hours PRN Moderate Pain      Vital Signs Last 24 Hrs  T(C): 37.2 (31 Mar 2018 10:14), Max: 37.2 (31 Mar 2018 10:14)  T(F): 99 (31 Mar 2018 10:14), Max: 99 (31 Mar 2018 10:14)  HR: 79 (31 Mar 2018 10:14) (75 - 81)  BP: 123/56 (31 Mar 2018 10:14) (123/56 - 142/61)  BP(mean): --  RR: 18 (31 Mar 2018 10:14) (16 - 18)  SpO2: 98% (31 Mar 2018 10:14) (94% - 98%)  CAPILLARY BLOOD GLUCOSE      POCT Blood Glucose.: 153 mg/dL (31 Mar 2018 09:16)  POCT Blood Glucose.: 151 mg/dL (30 Mar 2018 22:12)  POCT Blood Glucose.: 190 mg/dL (30 Mar 2018 18:01)  POCT Blood Glucose.: 220 mg/dL (30 Mar 2018 13:19)    I&O's Summary    30 Mar 2018 07:01  -  31 Mar 2018 07:00  --------------------------------------------------------  IN: 50 mL / OUT: 1650 mL / NET: -1600 mL    31 Mar 2018 07:01  -  31 Mar 2018 11:01  --------------------------------------------------------  IN: 0 mL / OUT: 200 mL / NET: -200 mL          PHYSICAL EXAM  GENERAL: NAD, well-developed  HEAD:  Atraumatic, Normocephalic  EYES: EOMI, PERRLA, conjunctiva and sclera clear  NECK: Supple, No JVD  CHEST/LUNG: Clear to auscultation bilaterally; No wheeze  HEART: Regular rate and rhythm; No murmurs, rubs, or gallops  ABDOMEN: Soft, Nontender, Nondistended; Bowel sounds present  EXTREMITIES:  2+ Peripheral Pulses, No clubbing, cyanosis, or edema  PSYCH: AAOx3  SKIN: No rashes or lesions    LABS:                        7.5    10.77 )-----------( 202      ( 31 Mar 2018 05:32 )             24.1     03-31    146<H>  |  110<H>  |  76<H>  ----------------------------<  156<H>  4.7   |  24  |  4.41<H>    Ca    8.2<L>      31 Mar 2018 05:32  Phos  4.5     03-31  Mg     1.9     03-31      PT/INR - ( 29 Mar 2018 17:10 )   PT: 13.0 SEC;   INR: 1.17          PTT - ( 29 Mar 2018 17:10 )  PTT:30.2 SEC            RADIOLOGY & ADDITIONAL TESTS:    Imaging Personally Reviewed:  Consultant(s) Notes Reviewed:    Care Discussed with Consultants/Other Providers:

## 2018-03-31 NOTE — PROGRESS NOTE ADULT - ASSESSMENT
63y Male s/p left femoral to below knee popliteal artery bypass with left RSVG, jump graft from distal anastomosis to left PT using remaining RSV, doing well on floor  - D/C Ortega  - reg diet  - Strict I/O's  - DVT ppx  - IV antibiotics  - PTP consult  - pods: plan to monitor wound for infection, may need [facility]      [id number]

## 2018-03-31 NOTE — PROGRESS NOTE ADULT - SUBJECTIVE AND OBJECTIVE BOX
C Team Vascular Surgery Progress Note (p [id number])    SUBJECTIVE:  The patient was seen and examined this morning during rounds. No acute events overnight. Pain controlled. No complaints.    OBJECTIVE:     ** VITAL SIGNS / I&O's **    Vital Signs Last 24 Hrs  T(C): 37.2 (31 Mar 2018 10:14), Max: 37.2 (31 Mar 2018 10:14)  T(F): 99 (31 Mar 2018 10:14), Max: 99 (31 Mar 2018 10:14)  HR: 79 (31 Mar 2018 10:14) (75 - 81)  BP: 123/56 (31 Mar 2018 10:14) (123/56 - 142/61)  BP(mean): --  RR: 18 (31 Mar 2018 10:14) (16 - 18)  SpO2: 98% (31 Mar 2018 10:14) (94% - 98%)      30 Mar 2018 07:01  -  31 Mar 2018 07:00  --------------------------------------------------------  IN:    sodium chloride 0.9%: 50 mL  Total IN: 50 mL    OUT:    Indwelling Catheter - Urethral: 1650 mL  Total OUT: 1650 mL    Total NET: -1600 mL      31 Mar 2018 07:01  -  31 Mar 2018 11:02  --------------------------------------------------------  IN:  Total IN: 0 mL    OUT:    Indwelling Catheter - Urethral: 200 mL  Total OUT: 200 mL    Total NET: -200 mL          ** PHYSICAL EXAM **    Gen: alert, NAD  Pulm: airway patent, non-labored respirations  Ext: L ext dressing c/d/i, s/p R BKA, L palpable PT pulse, L DP signal    ** LABS **                          7.5    10.77 )-----------( 202      ( 31 Mar 2018 05:32 )             24.1     31 Mar 2018 05:32    146    |  110    |  76     ----------------------------<  156    4.7     |  24     |  4.41     Ca    8.2        31 Mar 2018 05:32  Phos  4.5       31 Mar 2018 05:32  Mg     1.9       31 Mar 2018 05:32      PT/INR - ( 29 Mar 2018 17:10 )   PT: 13.0 SEC;   INR: 1.17          PTT - ( 29 Mar 2018 17:10 )  PTT:30.2 SEC  CAPILLARY BLOOD GLUCOSE      POCT Blood Glucose.: 153 mg/dL (31 Mar 2018 09:16)  POCT Blood Glucose.: 151 mg/dL (30 Mar 2018 22:12)  POCT Blood Glucose.: 190 mg/dL (30 Mar 2018 18:01)  POCT Blood Glucose.: 220 mg/dL (30 Mar 2018 13:19)              MEDICATIONS  (STANDING):  aspirin enteric coated 81 milliGRAM(s) Oral daily  atorvastatin 80 milliGRAM(s) Oral at bedtime  cyanocobalamin 1000 MICROGram(s) Oral daily  dextrose 50% Injectable 25 Gram(s) IV Push once  dextrose 50% Injectable 25 Gram(s) IV Push once  furosemide    Tablet 60 milliGRAM(s) Oral daily  heparin  Injectable 5000 Unit(s) SubCutaneous every 8 hours  influenza   Vaccine 0.5 milliLiter(s) IntraMuscular once  insulin lispro (HumaLOG) corrective regimen sliding scale   SubCutaneous three times a day before meals  labetalol 200 milliGRAM(s) Oral three times a day  multivitamin 1 Tablet(s) Oral daily  NIFEdipine XL 90 milliGRAM(s) Oral daily  pentoxifylline 400 milliGRAM(s) Oral three times a day  piperacillin/tazobactam IVPB. 3.375 Gram(s) IV Intermittent every 12 hours  silver sulfADIAZINE 1% Cream 1 Application(s) Topical daily  sodium chloride 0.9% lock flush 3 milliLiter(s) IV Push every 8 hours    MEDICATIONS  (PRN):  acetaminophen   Tablet. 650 milliGRAM(s) Oral every 6 hours PRN Moderate Pain (4 - 6)  benzocaine 15 mG/menthol 3.6 mG Lozenge 1 Lozenge Oral every 2 hours PRN Sore Throat  dextrose Gel 1 Dose(s) Oral once PRN Blood Glucose LESS THAN 70 milliGRAM(s)/deciliter  glucagon  Injectable 1 milliGRAM(s) IntraMuscular once PRN Glucose LESS THAN 70 milligrams/deciliter  oxyCODONE    5 mG/acetaminophen 325 mG 2 Tablet(s) Oral every 8 hours PRN Severe Pain (7 - 10)  oxyCODONE    5 mG/acetaminophen 325 mG 1 Tablet(s) Oral every 4 hours PRN Moderate Pain

## 2018-04-01 LAB
AMORPH CRY # UR COMP ASSIST: SIGNIFICANT CHANGE UP (ref 0–0)
ANISOCYTOSIS BLD QL: SLIGHT — SIGNIFICANT CHANGE UP
APPEARANCE UR: SIGNIFICANT CHANGE UP
BASOPHILS NFR SPEC: 0.9 % — SIGNIFICANT CHANGE UP (ref 0–2)
BILIRUB UR-MCNC: NEGATIVE — SIGNIFICANT CHANGE UP
BLASTS # FLD: 0 % — SIGNIFICANT CHANGE UP (ref 0–0)
BLD GP AB SCN SERPL QL: NEGATIVE — SIGNIFICANT CHANGE UP
BLOOD UR QL VISUAL: HIGH
BUN SERPL-MCNC: 78 MG/DL — HIGH (ref 7–23)
CALCIUM SERPL-MCNC: 8 MG/DL — LOW (ref 8.4–10.5)
CHLORIDE SERPL-SCNC: 109 MMOL/L — HIGH (ref 98–107)
CO2 SERPL-SCNC: 22 MMOL/L — SIGNIFICANT CHANGE UP (ref 22–31)
COLOR SPEC: YELLOW — SIGNIFICANT CHANGE UP
CREAT SERPL-MCNC: 4.54 MG/DL — HIGH (ref 0.5–1.3)
EOSINOPHIL NFR FLD: 0.9 % — SIGNIFICANT CHANGE UP (ref 0–6)
GIANT PLATELETS BLD QL SMEAR: PRESENT — SIGNIFICANT CHANGE UP
GLUCOSE BLDC GLUCOMTR-MCNC: 201 MG/DL — HIGH (ref 70–99)
GLUCOSE BLDC GLUCOMTR-MCNC: 238 MG/DL — HIGH (ref 70–99)
GLUCOSE BLDC GLUCOMTR-MCNC: 269 MG/DL — HIGH (ref 70–99)
GLUCOSE BLDC GLUCOMTR-MCNC: 294 MG/DL — HIGH (ref 70–99)
GLUCOSE SERPL-MCNC: 189 MG/DL — HIGH (ref 70–99)
GLUCOSE UR-MCNC: NEGATIVE — SIGNIFICANT CHANGE UP
HCT VFR BLD CALC: 20.4 % — CRITICAL LOW (ref 39–50)
HCT VFR BLD CALC: 22 % — LOW (ref 39–50)
HGB BLD-MCNC: 6.5 G/DL — CRITICAL LOW (ref 13–17)
HGB BLD-MCNC: 7.1 G/DL — LOW (ref 13–17)
HYPOCHROMIA BLD QL: SIGNIFICANT CHANGE UP
KETONES UR-MCNC: NEGATIVE — SIGNIFICANT CHANGE UP
LEUKOCYTE ESTERASE UR-ACNC: NEGATIVE — SIGNIFICANT CHANGE UP
LYMPHOCYTES NFR SPEC AUTO: 4.7 % — LOW (ref 13–44)
MAGNESIUM SERPL-MCNC: 1.9 MG/DL — SIGNIFICANT CHANGE UP (ref 1.6–2.6)
MCHC RBC-ENTMCNC: 27.8 PG — SIGNIFICANT CHANGE UP (ref 27–34)
MCHC RBC-ENTMCNC: 28 PG — SIGNIFICANT CHANGE UP (ref 27–34)
MCHC RBC-ENTMCNC: 31.9 % — LOW (ref 32–36)
MCHC RBC-ENTMCNC: 32.3 % — SIGNIFICANT CHANGE UP (ref 32–36)
MCV RBC AUTO: 86.6 FL — SIGNIFICANT CHANGE UP (ref 80–100)
MCV RBC AUTO: 87.2 FL — SIGNIFICANT CHANGE UP (ref 80–100)
METAMYELOCYTES # FLD: 0 % — SIGNIFICANT CHANGE UP (ref 0–1)
MONOCYTES NFR BLD: 10.4 % — HIGH (ref 2–9)
MUCOUS THREADS # UR AUTO: SIGNIFICANT CHANGE UP
MYELOCYTES NFR BLD: 0 % — SIGNIFICANT CHANGE UP (ref 0–0)
NEUTROPHIL AB SER-ACNC: 79.3 % — HIGH (ref 43–77)
NEUTS BAND # BLD: 0 % — SIGNIFICANT CHANGE UP (ref 0–6)
NITRITE UR-MCNC: NEGATIVE — SIGNIFICANT CHANGE UP
NRBC # FLD: 0 — SIGNIFICANT CHANGE UP
NRBC # FLD: 0 — SIGNIFICANT CHANGE UP
OTHER - HEMATOLOGY %: 0 — SIGNIFICANT CHANGE UP
PH UR: 5.5 — SIGNIFICANT CHANGE UP (ref 4.6–8)
PHOSPHATE SERPL-MCNC: 3.6 MG/DL — SIGNIFICANT CHANGE UP (ref 2.5–4.5)
PLATELET # BLD AUTO: 180 K/UL — SIGNIFICANT CHANGE UP (ref 150–400)
PLATELET # BLD AUTO: 192 K/UL — SIGNIFICANT CHANGE UP (ref 150–400)
PLATELET COUNT - ESTIMATE: NORMAL — SIGNIFICANT CHANGE UP
PMV BLD: 11.7 FL — SIGNIFICANT CHANGE UP (ref 7–13)
PMV BLD: 11.9 FL — SIGNIFICANT CHANGE UP (ref 7–13)
POLYCHROMASIA BLD QL SMEAR: SLIGHT — SIGNIFICANT CHANGE UP
POTASSIUM SERPL-MCNC: 4.3 MMOL/L — SIGNIFICANT CHANGE UP (ref 3.5–5.3)
POTASSIUM SERPL-SCNC: 4.3 MMOL/L — SIGNIFICANT CHANGE UP (ref 3.5–5.3)
PROMYELOCYTES # FLD: 0 % — SIGNIFICANT CHANGE UP (ref 0–0)
PROT UR-MCNC: 100 MG/DL — SIGNIFICANT CHANGE UP
RBC # BLD: 2.34 M/UL — LOW (ref 4.2–5.8)
RBC # BLD: 2.54 M/UL — LOW (ref 4.2–5.8)
RBC # FLD: 15.1 % — HIGH (ref 10.3–14.5)
RBC # FLD: 15.2 % — HIGH (ref 10.3–14.5)
RBC CASTS # UR COMP ASSIST: HIGH (ref 0–?)
RH IG SCN BLD-IMP: POSITIVE — SIGNIFICANT CHANGE UP
SMUDGE CELLS # BLD: PRESENT — SIGNIFICANT CHANGE UP
SODIUM SERPL-SCNC: 145 MMOL/L — SIGNIFICANT CHANGE UP (ref 135–145)
SP GR SPEC: 1.02 — SIGNIFICANT CHANGE UP (ref 1–1.04)
SPECIMEN SOURCE: SIGNIFICANT CHANGE UP
SPECIMEN SOURCE: SIGNIFICANT CHANGE UP
UROBILINOGEN FLD QL: NORMAL MG/DL — SIGNIFICANT CHANGE UP
VARIANT LYMPHS # BLD: 3.8 % — SIGNIFICANT CHANGE UP
WBC # BLD: 10.04 K/UL — SIGNIFICANT CHANGE UP (ref 3.8–10.5)
WBC # BLD: 11.16 K/UL — HIGH (ref 3.8–10.5)
WBC # FLD AUTO: 10.04 K/UL — SIGNIFICANT CHANGE UP (ref 3.8–10.5)
WBC # FLD AUTO: 11.16 K/UL — HIGH (ref 3.8–10.5)
WBC UR QL: SIGNIFICANT CHANGE UP (ref 0–?)

## 2018-04-01 PROCEDURE — 99233 SBSQ HOSP IP/OBS HIGH 50: CPT

## 2018-04-01 RX ORDER — TAMSULOSIN HYDROCHLORIDE 0.4 MG/1
0.4 CAPSULE ORAL ONCE
Refills: 0 | Status: COMPLETED | OUTPATIENT
Start: 2018-04-01 | End: 2018-04-01

## 2018-04-01 RX ORDER — FUROSEMIDE 40 MG
40 TABLET ORAL ONCE
Refills: 0 | Status: DISCONTINUED | OUTPATIENT
Start: 2018-04-01 | End: 2018-04-01

## 2018-04-01 RX ORDER — INSULIN LISPRO 100/ML
VIAL (ML) SUBCUTANEOUS
Refills: 0 | Status: DISCONTINUED | OUTPATIENT
Start: 2018-04-01 | End: 2018-04-13

## 2018-04-01 RX ORDER — DOCUSATE SODIUM 100 MG
100 CAPSULE ORAL ONCE
Refills: 0 | Status: COMPLETED | OUTPATIENT
Start: 2018-04-01 | End: 2018-04-01

## 2018-04-01 RX ORDER — TAMSULOSIN HYDROCHLORIDE 0.4 MG/1
0.4 CAPSULE ORAL AT BEDTIME
Refills: 0 | Status: DISCONTINUED | OUTPATIENT
Start: 2018-04-01 | End: 2018-04-13

## 2018-04-01 RX ADMIN — ATORVASTATIN CALCIUM 80 MILLIGRAM(S): 80 TABLET, FILM COATED ORAL at 21:36

## 2018-04-01 RX ADMIN — Medication 400 MILLIGRAM(S): at 15:06

## 2018-04-01 RX ADMIN — Medication 60 MILLIGRAM(S): at 05:33

## 2018-04-01 RX ADMIN — PREGABALIN 1000 MICROGRAM(S): 225 CAPSULE ORAL at 12:15

## 2018-04-01 RX ADMIN — Medication 1 APPLICATION(S): at 05:37

## 2018-04-01 RX ADMIN — PIPERACILLIN AND TAZOBACTAM 25 GRAM(S): 4; .5 INJECTION, POWDER, LYOPHILIZED, FOR SOLUTION INTRAVENOUS at 05:33

## 2018-04-01 RX ADMIN — SODIUM CHLORIDE 3 MILLILITER(S): 9 INJECTION INTRAMUSCULAR; INTRAVENOUS; SUBCUTANEOUS at 05:33

## 2018-04-01 RX ADMIN — HEPARIN SODIUM 5000 UNIT(S): 5000 INJECTION INTRAVENOUS; SUBCUTANEOUS at 21:37

## 2018-04-01 RX ADMIN — TAMSULOSIN HYDROCHLORIDE 0.4 MILLIGRAM(S): 0.4 CAPSULE ORAL at 21:36

## 2018-04-01 RX ADMIN — SODIUM CHLORIDE 3 MILLILITER(S): 9 INJECTION INTRAMUSCULAR; INTRAVENOUS; SUBCUTANEOUS at 13:46

## 2018-04-01 RX ADMIN — Medication 200 MILLIGRAM(S): at 15:06

## 2018-04-01 RX ADMIN — PIPERACILLIN AND TAZOBACTAM 25 GRAM(S): 4; .5 INJECTION, POWDER, LYOPHILIZED, FOR SOLUTION INTRAVENOUS at 18:54

## 2018-04-01 RX ADMIN — HEPARIN SODIUM 5000 UNIT(S): 5000 INJECTION INTRAVENOUS; SUBCUTANEOUS at 05:33

## 2018-04-01 RX ADMIN — Medication 400 MILLIGRAM(S): at 21:37

## 2018-04-01 RX ADMIN — Medication 200 MILLIGRAM(S): at 21:37

## 2018-04-01 RX ADMIN — HEPARIN SODIUM 5000 UNIT(S): 5000 INJECTION INTRAVENOUS; SUBCUTANEOUS at 15:03

## 2018-04-01 RX ADMIN — Medication 100 MILLIGRAM(S): at 22:12

## 2018-04-01 RX ADMIN — Medication 3: at 15:04

## 2018-04-01 RX ADMIN — Medication 200 MILLIGRAM(S): at 05:33

## 2018-04-01 RX ADMIN — Medication 81 MILLIGRAM(S): at 12:14

## 2018-04-01 RX ADMIN — Medication 400 MILLIGRAM(S): at 05:33

## 2018-04-01 RX ADMIN — TAMSULOSIN HYDROCHLORIDE 0.4 MILLIGRAM(S): 0.4 CAPSULE ORAL at 15:07

## 2018-04-01 RX ADMIN — SODIUM CHLORIDE 3 MILLILITER(S): 9 INJECTION INTRAMUSCULAR; INTRAVENOUS; SUBCUTANEOUS at 22:12

## 2018-04-01 RX ADMIN — Medication 1 TABLET(S): at 12:14

## 2018-04-01 RX ADMIN — Medication 6: at 18:36

## 2018-04-01 RX ADMIN — Medication 90 MILLIGRAM(S): at 05:33

## 2018-04-01 RX ADMIN — Medication 2: at 09:50

## 2018-04-01 NOTE — PROGRESS NOTE ADULT - ASSESSMENT
LEFT toes, forefoot, midfoot blister (improving).    Plan:  - Pt seen and evaluated  - Appearance of foot continues to improve  - Cont IV abx  - Silvadene to LEFT foot wound daily  - No plan for surgical debridement at this time  - Pt will need close follow up at the wound care center on d/c

## 2018-04-01 NOTE — PROGRESS NOTE ADULT - SUBJECTIVE AND OBJECTIVE BOX
GENERAL SURGERY DAILY PROGRESS NOTE:       Subjective:  The patient was seen and examined this morning during rounds. Pt last night had fever to 100.6. Fever w/u sent. H/H low this am. Pt voided after cervantes removed however pt having symptoms of urgency and difficulty voiding. Pain controlled. No complaints.        Objective:    PE:  Gen: alert, NAD  Pulm: airway patent, non-labored respirations  Ext: L ext dressing c/d/i, s/p R BKA, L palpable PT pulse, L DP signal      Vital Signs Last 24 Hrs  T(C): 37.2 (2018 09:45), Max: 38.1 (31 Mar 2018 21:44)  T(F): 99 (2018 09:45), Max: 100.6 (31 Mar 2018 21:44)  HR: 79 (2018 09:45) (79 - 84)  BP: 93/43 (2018 09:45) (93/43 - 144/64)  BP(mean): --  RR: 18 (2018 09:45) (17 - 18)  SpO2: 95% (2018 09:45) (94% - 97%)    I&O's Detail    31 Mar 2018 07:  -  2018 07:00  --------------------------------------------------------  IN:    Oral Fluid: 300 mL  Total IN: 300 mL    OUT:    Indwelling Catheter - Urethral: 200 mL  Total OUT: 200 mL    Total NET: 100 mL      2018 07:01  -  2018 11:31  --------------------------------------------------------  IN:  Total IN: 0 mL    OUT:  Total OUT: 0 mL    Total NET: 0 mL          Daily     Daily Weight in k.3 (2018 01:50)    MEDICATIONS  (STANDING):  aspirin enteric coated 81 milliGRAM(s) Oral daily  atorvastatin 80 milliGRAM(s) Oral at bedtime  cyanocobalamin 1000 MICROGram(s) Oral daily  dextrose 50% Injectable 25 Gram(s) IV Push once  dextrose 50% Injectable 25 Gram(s) IV Push once  furosemide    Tablet 60 milliGRAM(s) Oral daily  furosemide   Injectable 40 milliGRAM(s) IV Push once  heparin  Injectable 5000 Unit(s) SubCutaneous every 8 hours  influenza   Vaccine 0.5 milliLiter(s) IntraMuscular once  insulin lispro (HumaLOG) corrective regimen sliding scale   SubCutaneous three times a day before meals  labetalol 200 milliGRAM(s) Oral three times a day  multivitamin 1 Tablet(s) Oral daily  NIFEdipine XL 90 milliGRAM(s) Oral daily  pentoxifylline 400 milliGRAM(s) Oral three times a day  piperacillin/tazobactam IVPB. 3.375 Gram(s) IV Intermittent every 12 hours  silver sulfADIAZINE 1% Cream 1 Application(s) Topical daily  sodium chloride 0.9% lock flush 3 milliLiter(s) IV Push every 8 hours  tamsulosin 0.4 milliGRAM(s) Oral at bedtime    MEDICATIONS  (PRN):  acetaminophen   Tablet. 650 milliGRAM(s) Oral every 6 hours PRN Moderate Pain (4 - 6)  benzocaine 15 mG/menthol 3.6 mG Lozenge 1 Lozenge Oral every 2 hours PRN Sore Throat  dextrose Gel 1 Dose(s) Oral once PRN Blood Glucose LESS THAN 70 milliGRAM(s)/deciliter  glucagon  Injectable 1 milliGRAM(s) IntraMuscular once PRN Glucose LESS THAN 70 milligrams/deciliter  oxyCODONE    5 mG/acetaminophen 325 mG 2 Tablet(s) Oral every 8 hours PRN Severe Pain (7 - 10)  oxyCODONE    5 mG/acetaminophen 325 mG 1 Tablet(s) Oral every 4 hours PRN Moderate Pain      LABS:                        6.5    11.16 )-----------( 192      ( 2018 05:42 )             20.4     04-    145  |  109<H>  |  78<H>  ----------------------------<  189<H>  4.3   |  22  |  4.54<H>    Ca    8.0<L>      2018 05:42  Phos  3.6     04-  Mg     1.9

## 2018-04-01 NOTE — PROGRESS NOTE ADULT - SUBJECTIVE AND OBJECTIVE BOX
Patient is a 63y old  Male who presents with a chief complaint of 63M PMH DM, HTN, CKD p/w L foot pain x 3 days. (19 Feb 2018 10:39)        SUBJECTIVE / OVERNIGHT EVENTS: No acute overnight event. No complaint this morning.      MEDICATIONS  (STANDING):  aspirin enteric coated 81 milliGRAM(s) Oral daily  atorvastatin 80 milliGRAM(s) Oral at bedtime  cyanocobalamin 1000 MICROGram(s) Oral daily  dextrose 50% Injectable 25 Gram(s) IV Push once  dextrose 50% Injectable 25 Gram(s) IV Push once  furosemide    Tablet 60 milliGRAM(s) Oral daily  furosemide   Injectable 40 milliGRAM(s) IV Push once  heparin  Injectable 5000 Unit(s) SubCutaneous every 8 hours  influenza   Vaccine 0.5 milliLiter(s) IntraMuscular once  insulin lispro (HumaLOG) corrective regimen sliding scale   SubCutaneous three times a day before meals  labetalol 200 milliGRAM(s) Oral three times a day  multivitamin 1 Tablet(s) Oral daily  NIFEdipine XL 90 milliGRAM(s) Oral daily  pentoxifylline 400 milliGRAM(s) Oral three times a day  piperacillin/tazobactam IVPB. 3.375 Gram(s) IV Intermittent every 12 hours  silver sulfADIAZINE 1% Cream 1 Application(s) Topical daily  sodium chloride 0.9% lock flush 3 milliLiter(s) IV Push every 8 hours  tamsulosin 0.4 milliGRAM(s) Oral at bedtime    MEDICATIONS  (PRN):  acetaminophen   Tablet. 650 milliGRAM(s) Oral every 6 hours PRN Moderate Pain (4 - 6)  benzocaine 15 mG/menthol 3.6 mG Lozenge 1 Lozenge Oral every 2 hours PRN Sore Throat  dextrose Gel 1 Dose(s) Oral once PRN Blood Glucose LESS THAN 70 milliGRAM(s)/deciliter  glucagon  Injectable 1 milliGRAM(s) IntraMuscular once PRN Glucose LESS THAN 70 milligrams/deciliter  oxyCODONE    5 mG/acetaminophen 325 mG 2 Tablet(s) Oral every 8 hours PRN Severe Pain (7 - 10)  oxyCODONE    5 mG/acetaminophen 325 mG 1 Tablet(s) Oral every 4 hours PRN Moderate Pain      Vital Signs Last 24 Hrs  T(C): 37.2 (01 Apr 2018 09:45), Max: 38.1 (31 Mar 2018 21:44)  T(F): 99 (01 Apr 2018 09:45), Max: 100.6 (31 Mar 2018 21:44)  HR: 79 (01 Apr 2018 09:45) (79 - 84)  BP: 93/43 (01 Apr 2018 09:45) (93/43 - 144/64)  BP(mean): --  RR: 18 (01 Apr 2018 09:45) (17 - 18)  SpO2: 95% (01 Apr 2018 09:45) (94% - 97%)  CAPILLARY BLOOD GLUCOSE      POCT Blood Glucose.: 201 mg/dL (01 Apr 2018 09:25)  POCT Blood Glucose.: 187 mg/dL (31 Mar 2018 21:37)  POCT Blood Glucose.: 225 mg/dL (31 Mar 2018 17:57)  POCT Blood Glucose.: 243 mg/dL (31 Mar 2018 12:56)    I&O's Summary    31 Mar 2018 07:01  -  01 Apr 2018 07:00  --------------------------------------------------------  IN: 300 mL / OUT: 200 mL / NET: 100 mL    01 Apr 2018 07:01  -  01 Apr 2018 11:50  --------------------------------------------------------  IN: 0 mL / OUT: 0 mL / NET: 0 mL          PHYSICAL EXAM  GENERAL: NAD, well-developed  HEAD:  Atraumatic, Normocephalic  EYES: EOMI, PERRLA, conjunctiva and sclera clear  NECK: Supple, No JVD  CHEST/LUNG: Clear to auscultation bilaterally; No wheeze  HEART: Regular rate and rhythm; No murmurs, rubs, or gallops  ABDOMEN: Soft, Nontender, Nondistended; Bowel sounds present  EXTREMITIES:  LE dressing c/d/i    LABS:                        6.5    11.16 )-----------( 192      ( 01 Apr 2018 05:42 )             20.4     04-01    145  |  109<H>  |  78<H>  ----------------------------<  189<H>  4.3   |  22  |  4.54<H>    Ca    8.0<L>      01 Apr 2018 05:42  Phos  3.6     04-01  Mg     1.9     04-01                  RADIOLOGY & ADDITIONAL TESTS:    Imaging Personally Reviewed:  Consultant(s) Notes Reviewed:    Care Discussed with Consultants/Other Providers:

## 2018-04-01 NOTE — PROGRESS NOTE ADULT - ASSESSMENT
63y Male s/p left femoral to below knee popliteal artery bypass with left RSVG, jump graft from distal anastomosis to left PT using remaining RSV, doing well on floor    - re-insert cervantes and start flomax  - reg diet  - Strict I/O's  - DVT ppx  - IV antibiotics  - obtain PT consult  - transfuse 1 unit pRBC with 40 IV lasix  - f/u fever w/u ctx  - pods: plan to monitor wound for infection, may need [facility]      [id number] 63y Male s/p left femoral to below knee popliteal artery bypass with left RSVG, jump graft from distal anastomosis to left PT using remaining RSV, doing well on floor    - re-insert cervantes and start flomax  - reg diet  - Strict I/O's  - DVT ppx  - IV antibiotics  - obtain PT consult  - transfuse 1 unit pRBC with 40 IV lasix  - f/u fever w/u ctx  - pods: no plan for surgical intervention, plan for outpt wound care      [id number]

## 2018-04-01 NOTE — PROGRESS NOTE ADULT - SUBJECTIVE AND OBJECTIVE BOX
Patient is a 63y old  Male who presents with a chief complaint of 63M PMH DM, HTN, CKD p/w L foot pain x 3 days. (19 Feb 2018 10:39)       INTERVAL HPI/OVERNIGHT EVENTS:  Patient seen and evaluated at bedside.  Pt is resting comfortable in NAD. Denies N/V/F/C.      Allergies    No Known Allergies    Intolerances        Vital Signs Last 24 Hrs  T(C): 37.2 (01 Apr 2018 09:45), Max: 38.1 (31 Mar 2018 21:44)  T(F): 99 (01 Apr 2018 09:45), Max: 100.6 (31 Mar 2018 21:44)  HR: 79 (01 Apr 2018 09:45) (79 - 84)  BP: 93/43 (01 Apr 2018 09:45) (93/43 - 144/64)  BP(mean): --  RR: 18 (01 Apr 2018 09:45) (17 - 18)  SpO2: 95% (01 Apr 2018 09:45) (94% - 97%)    LABS:                        6.5    11.16 )-----------( 192      ( 01 Apr 2018 05:42 )             20.4     04-01    145  |  109<H>  |  78<H>  ----------------------------<  189<H>  4.3   |  22  |  4.54<H>    Ca    8.0<L>      01 Apr 2018 05:42  Phos  3.6     04-01  Mg     1.9     04-01          CAPILLARY BLOOD GLUCOSE      POCT Blood Glucose.: 201 mg/dL (01 Apr 2018 09:25)  POCT Blood Glucose.: 187 mg/dL (31 Mar 2018 21:37)  POCT Blood Glucose.: 225 mg/dL (31 Mar 2018 17:57)      Lower Extremity Physical Exam:  Vasular: DP/PT 0/4, B/L, CFT <2 seconds B/L, Temperature gradient warm, B/L.   Neuro: Epicritic sensation absent to the level of toes, B/L.  Musculoskeletal/Ortho: RIGHT BKA.  Skin: Left foot deroofed blister with cherry red non-blanchable trophic changes to plantar aspect of toes 1-5, forefoot, midfoot, no ascending erythema or swelling, no malodor, no purulence, no deep probe, etiology unknown, ROM of toes intact, CFT to each toes normal, no sinus tract, no undermining.

## 2018-04-02 DIAGNOSIS — D64.9 ANEMIA, UNSPECIFIED: ICD-10-CM

## 2018-04-02 LAB
BUN SERPL-MCNC: 80 MG/DL — HIGH (ref 7–23)
CALCIUM SERPL-MCNC: 7.9 MG/DL — LOW (ref 8.4–10.5)
CHLORIDE SERPL-SCNC: 107 MMOL/L — SIGNIFICANT CHANGE UP (ref 98–107)
CO2 SERPL-SCNC: 22 MMOL/L — SIGNIFICANT CHANGE UP (ref 22–31)
CREAT SERPL-MCNC: 5.01 MG/DL — HIGH (ref 0.5–1.3)
GLUCOSE BLDC GLUCOMTR-MCNC: 128 MG/DL — HIGH (ref 70–99)
GLUCOSE BLDC GLUCOMTR-MCNC: 133 MG/DL — HIGH (ref 70–99)
GLUCOSE BLDC GLUCOMTR-MCNC: 134 MG/DL — HIGH (ref 70–99)
GLUCOSE BLDC GLUCOMTR-MCNC: 165 MG/DL — HIGH (ref 70–99)
GLUCOSE SERPL-MCNC: 168 MG/DL — HIGH (ref 70–99)
HCT VFR BLD CALC: 18.2 % — CRITICAL LOW (ref 39–50)
HCT VFR BLD CALC: 25.1 % — LOW (ref 39–50)
HGB BLD-MCNC: 5.9 G/DL — CRITICAL LOW (ref 13–17)
HGB BLD-MCNC: 8.2 G/DL — LOW (ref 13–17)
MAGNESIUM SERPL-MCNC: 2.1 MG/DL — SIGNIFICANT CHANGE UP (ref 1.6–2.6)
MCHC RBC-ENTMCNC: 27.7 PG — SIGNIFICANT CHANGE UP (ref 27–34)
MCHC RBC-ENTMCNC: 28 PG — SIGNIFICANT CHANGE UP (ref 27–34)
MCHC RBC-ENTMCNC: 32.4 % — SIGNIFICANT CHANGE UP (ref 32–36)
MCHC RBC-ENTMCNC: 32.7 % — SIGNIFICANT CHANGE UP (ref 32–36)
MCV RBC AUTO: 85.4 FL — SIGNIFICANT CHANGE UP (ref 80–100)
MCV RBC AUTO: 85.7 FL — SIGNIFICANT CHANGE UP (ref 80–100)
NRBC # FLD: 0 — SIGNIFICANT CHANGE UP
NRBC # FLD: 0 — SIGNIFICANT CHANGE UP
OB PNL STL: NEGATIVE — SIGNIFICANT CHANGE UP
PHOSPHATE SERPL-MCNC: 3.3 MG/DL — SIGNIFICANT CHANGE UP (ref 2.5–4.5)
PLATELET # BLD AUTO: 200 K/UL — SIGNIFICANT CHANGE UP (ref 150–400)
PLATELET # BLD AUTO: 210 K/UL — SIGNIFICANT CHANGE UP (ref 150–400)
PMV BLD: 11.2 FL — SIGNIFICANT CHANGE UP (ref 7–13)
PMV BLD: 12.1 FL — SIGNIFICANT CHANGE UP (ref 7–13)
POTASSIUM SERPL-MCNC: 4.1 MMOL/L — SIGNIFICANT CHANGE UP (ref 3.5–5.3)
POTASSIUM SERPL-SCNC: 4.1 MMOL/L — SIGNIFICANT CHANGE UP (ref 3.5–5.3)
RBC # BLD: 2.13 M/UL — LOW (ref 4.2–5.8)
RBC # BLD: 2.93 M/UL — LOW (ref 4.2–5.8)
RBC # FLD: 15.3 % — HIGH (ref 10.3–14.5)
RBC # FLD: 15.6 % — HIGH (ref 10.3–14.5)
SODIUM SERPL-SCNC: 144 MMOL/L — SIGNIFICANT CHANGE UP (ref 135–145)
SPECIMEN SOURCE: SIGNIFICANT CHANGE UP
WBC # BLD: 10.98 K/UL — HIGH (ref 3.8–10.5)
WBC # BLD: 11.27 K/UL — HIGH (ref 3.8–10.5)
WBC # FLD AUTO: 10.98 K/UL — HIGH (ref 3.8–10.5)
WBC # FLD AUTO: 11.27 K/UL — HIGH (ref 3.8–10.5)

## 2018-04-02 PROCEDURE — 99233 SBSQ HOSP IP/OBS HIGH 50: CPT

## 2018-04-02 PROCEDURE — 99232 SBSQ HOSP IP/OBS MODERATE 35: CPT

## 2018-04-02 RX ORDER — POLYETHYLENE GLYCOL 3350 17 G/17G
17 POWDER, FOR SOLUTION ORAL DAILY
Refills: 0 | Status: DISCONTINUED | OUTPATIENT
Start: 2018-04-02 | End: 2018-04-13

## 2018-04-02 RX ORDER — SODIUM HYPOCHLORITE 0.125 %
1 SOLUTION, NON-ORAL MISCELLANEOUS DAILY
Refills: 0 | Status: DISCONTINUED | OUTPATIENT
Start: 2018-04-02 | End: 2018-04-13

## 2018-04-02 RX ORDER — COLLAGENASE CLOSTRIDIUM HIST. 250 UNIT/G
1 OINTMENT (GRAM) TOPICAL DAILY
Refills: 0 | Status: DISCONTINUED | OUTPATIENT
Start: 2018-04-02 | End: 2018-04-13

## 2018-04-02 RX ORDER — FAMOTIDINE 10 MG/ML
20 INJECTION INTRAVENOUS DAILY
Refills: 0 | Status: DISCONTINUED | OUTPATIENT
Start: 2018-04-02 | End: 2018-04-13

## 2018-04-02 RX ADMIN — POLYETHYLENE GLYCOL 3350 17 GRAM(S): 17 POWDER, FOR SOLUTION ORAL at 16:37

## 2018-04-02 RX ADMIN — FAMOTIDINE 20 MILLIGRAM(S): 10 INJECTION INTRAVENOUS at 21:27

## 2018-04-02 RX ADMIN — Medication 2: at 09:25

## 2018-04-02 RX ADMIN — HEPARIN SODIUM 5000 UNIT(S): 5000 INJECTION INTRAVENOUS; SUBCUTANEOUS at 05:50

## 2018-04-02 RX ADMIN — Medication 60 MILLIGRAM(S): at 05:50

## 2018-04-02 RX ADMIN — HEPARIN SODIUM 5000 UNIT(S): 5000 INJECTION INTRAVENOUS; SUBCUTANEOUS at 21:27

## 2018-04-02 RX ADMIN — SODIUM CHLORIDE 3 MILLILITER(S): 9 INJECTION INTRAMUSCULAR; INTRAVENOUS; SUBCUTANEOUS at 21:30

## 2018-04-02 RX ADMIN — Medication 200 MILLIGRAM(S): at 14:48

## 2018-04-02 RX ADMIN — Medication 200 MILLIGRAM(S): at 05:49

## 2018-04-02 RX ADMIN — Medication 400 MILLIGRAM(S): at 05:50

## 2018-04-02 RX ADMIN — Medication 200 MILLIGRAM(S): at 21:27

## 2018-04-02 RX ADMIN — ATORVASTATIN CALCIUM 80 MILLIGRAM(S): 80 TABLET, FILM COATED ORAL at 21:27

## 2018-04-02 RX ADMIN — Medication 90 MILLIGRAM(S): at 05:49

## 2018-04-02 RX ADMIN — SODIUM CHLORIDE 3 MILLILITER(S): 9 INJECTION INTRAMUSCULAR; INTRAVENOUS; SUBCUTANEOUS at 14:48

## 2018-04-02 RX ADMIN — TAMSULOSIN HYDROCHLORIDE 0.4 MILLIGRAM(S): 0.4 CAPSULE ORAL at 21:27

## 2018-04-02 RX ADMIN — Medication 1 TABLET(S): at 12:23

## 2018-04-02 RX ADMIN — SODIUM CHLORIDE 3 MILLILITER(S): 9 INJECTION INTRAMUSCULAR; INTRAVENOUS; SUBCUTANEOUS at 05:49

## 2018-04-02 RX ADMIN — HEPARIN SODIUM 5000 UNIT(S): 5000 INJECTION INTRAVENOUS; SUBCUTANEOUS at 14:48

## 2018-04-02 RX ADMIN — Medication 400 MILLIGRAM(S): at 14:48

## 2018-04-02 RX ADMIN — Medication 400 MILLIGRAM(S): at 21:27

## 2018-04-02 RX ADMIN — PIPERACILLIN AND TAZOBACTAM 25 GRAM(S): 4; .5 INJECTION, POWDER, LYOPHILIZED, FOR SOLUTION INTRAVENOUS at 05:50

## 2018-04-02 RX ADMIN — PIPERACILLIN AND TAZOBACTAM 25 GRAM(S): 4; .5 INJECTION, POWDER, LYOPHILIZED, FOR SOLUTION INTRAVENOUS at 17:41

## 2018-04-02 RX ADMIN — Medication 81 MILLIGRAM(S): at 12:23

## 2018-04-02 RX ADMIN — PREGABALIN 1000 MICROGRAM(S): 225 CAPSULE ORAL at 12:23

## 2018-04-02 NOTE — PROGRESS NOTE ADULT - SUBJECTIVE AND OBJECTIVE BOX
GENERAL SURGERY DAILY PROGRESS NOTE:       Subjective:  Pt's Hct responded minimally from 20 to 22 after 1 unit pRBC. Ortega re-inserted yesterday for urinary retention and flomax started.       Objective:    PE:  Gen: alert, NAD  Pulm: airway patent, non-labored respirations  Ext: L ext dressing c/d/i, s/p R BKA, L palpable PT pulse, L DP signal      Vital Signs Last 24 Hrs  T(C): 36.8 (2018 05:47), Max: 37.2 (2018 09:45)  T(F): 98.2 (2018 05:47), Max: 99 (2018 09:45)  HR: 77 (2018 05:47) (61 - 82)  BP: 139/64 (2018 05:47) (93/43 - 139/64)  BP(mean): --  RR: 16 (2018 05:47) (16 - 18)  SpO2: 95% (2018 05:47) (94% - 100%)    I&O's Detail    2018 07:01  -  2018 07:00  --------------------------------------------------------  IN:    IV PiggyBack: 300 mL    Packed Red Blood Cells: 300 mL  Total IN: 600 mL    OUT:    Indwelling Catheter - Urethral: 550 mL  Total OUT: 550 mL    Total NET: 50 mL          Daily     Daily Weight in k.5 (2018 01:24)    MEDICATIONS  (STANDING):  aspirin enteric coated 81 milliGRAM(s) Oral daily  atorvastatin 80 milliGRAM(s) Oral at bedtime  cyanocobalamin 1000 MICROGram(s) Oral daily  dextrose 50% Injectable 25 Gram(s) IV Push once  dextrose 50% Injectable 25 Gram(s) IV Push once  furosemide    Tablet 60 milliGRAM(s) Oral daily  heparin  Injectable 5000 Unit(s) SubCutaneous every 8 hours  influenza   Vaccine 0.5 milliLiter(s) IntraMuscular once  insulin lispro (HumaLOG) corrective regimen sliding scale   SubCutaneous three times a day before meals  labetalol 200 milliGRAM(s) Oral three times a day  multivitamin 1 Tablet(s) Oral daily  NIFEdipine XL 90 milliGRAM(s) Oral daily  pentoxifylline 400 milliGRAM(s) Oral three times a day  piperacillin/tazobactam IVPB. 3.375 Gram(s) IV Intermittent every 12 hours  silver sulfADIAZINE 1% Cream 1 Application(s) Topical daily  sodium chloride 0.9% lock flush 3 milliLiter(s) IV Push every 8 hours  tamsulosin 0.4 milliGRAM(s) Oral at bedtime    MEDICATIONS  (PRN):  acetaminophen   Tablet. 650 milliGRAM(s) Oral every 6 hours PRN Moderate Pain (4 - 6)  benzocaine 15 mG/menthol 3.6 mG Lozenge 1 Lozenge Oral every 2 hours PRN Sore Throat  dextrose Gel 1 Dose(s) Oral once PRN Blood Glucose LESS THAN 70 milliGRAM(s)/deciliter  glucagon  Injectable 1 milliGRAM(s) IntraMuscular once PRN Glucose LESS THAN 70 milligrams/deciliter  oxyCODONE    5 mG/acetaminophen 325 mG 2 Tablet(s) Oral every 8 hours PRN Severe Pain (7 - 10)  oxyCODONE    5 mG/acetaminophen 325 mG 1 Tablet(s) Oral every 4 hours PRN Moderate Pain      LABS:                        5.9    11.27 )-----------( 200      ( 2018 05:24 )             18.2     04-02    144  |  107  |  80<H>  ----------------------------<  168<H>  4.1   |  22  |  5.01<H>    Ca    7.9<L>      2018 05:24  Phos  3.3     04-02  Mg     2.1     04-02        Urinalysis Basic - ( 2018 13:40 )    Color: YELLOW / Appearance: HAZY / S.019 / pH: 5.5  Gluc: NEGATIVE / Ketone: NEGATIVE  / Bili: NEGATIVE / Urobili: NORMAL mg/dL   Blood: SMALL / Protein: 100 mg/dL / Nitrite: NEGATIVE   Leuk Esterase: NEGATIVE / RBC: 5-10 / WBC 0-2   Sq Epi: x / Non Sq Epi: x / Bacteria: x GENERAL SURGERY DAILY PROGRESS NOTE:       Subjective:  Pt's Hct responded minimally from 20 to 22 after 1 unit pRBC. Ortega re-inserted yesterday for urinary retention and flomax started.     Objective:    PE:  Gen: alert, NAD  Pulm: airway patent, non-labored respirations  Ext: L ext dressing c/d/i, s/p R BKA, L palpable PT pulse, L DP signal incision stable       Vital Signs Last 24 Hrs  T(C): 36.8 (2018 05:47), Max: 37.2 (2018 09:45)  T(F): 98.2 (2018 05:47), Max: 99 (2018 09:45)  HR: 77 (2018 05:47) (61 - 82)  BP: 139/64 (2018 05:47) (93/43 - 139/64)  BP(mean): --  RR: 16 (2018 05:47) (16 - 18)  SpO2: 95% (2018 05:47) (94% - 100%)    I&O's Detail    2018 07:01  -  2018 07:00  --------------------------------------------------------  IN:    IV PiggyBack: 300 mL    Packed Red Blood Cells: 300 mL  Total IN: 600 mL    OUT:    Indwelling Catheter - Urethral: 550 mL  Total OUT: 550 mL    Total NET: 50 mL    Daily Weight in k.5 (2018 01:24)    MEDICATIONS  (STANDING):  aspirin enteric coated 81 milliGRAM(s) Oral daily  atorvastatin 80 milliGRAM(s) Oral at bedtime  cyanocobalamin 1000 MICROGram(s) Oral daily  dextrose 50% Injectable 25 Gram(s) IV Push once  dextrose 50% Injectable 25 Gram(s) IV Push once  furosemide    Tablet 60 milliGRAM(s) Oral daily  heparin  Injectable 5000 Unit(s) SubCutaneous every 8 hours  influenza   Vaccine 0.5 milliLiter(s) IntraMuscular once  insulin lispro (HumaLOG) corrective regimen sliding scale   SubCutaneous three times a day before meals  labetalol 200 milliGRAM(s) Oral three times a day  multivitamin 1 Tablet(s) Oral daily  NIFEdipine XL 90 milliGRAM(s) Oral daily  pentoxifylline 400 milliGRAM(s) Oral three times a day  piperacillin/tazobactam IVPB. 3.375 Gram(s) IV Intermittent every 12 hours  silver sulfADIAZINE 1% Cream 1 Application(s) Topical daily  sodium chloride 0.9% lock flush 3 milliLiter(s) IV Push every 8 hours  tamsulosin 0.4 milliGRAM(s) Oral at bedtime    MEDICATIONS  (PRN):  acetaminophen   Tablet. 650 milliGRAM(s) Oral every 6 hours PRN Moderate Pain (4 - 6)  benzocaine 15 mG/menthol 3.6 mG Lozenge 1 Lozenge Oral every 2 hours PRN Sore Throat  dextrose Gel 1 Dose(s) Oral once PRN Blood Glucose LESS THAN 70 milliGRAM(s)/deciliter  glucagon  Injectable 1 milliGRAM(s) IntraMuscular once PRN Glucose LESS THAN 70 milligrams/deciliter  oxyCODONE    5 mG/acetaminophen 325 mG 2 Tablet(s) Oral every 8 hours PRN Severe Pain (7 - 10)  oxyCODONE    5 mG/acetaminophen 325 mG 1 Tablet(s) Oral every 4 hours PRN Moderate Pain      LABS:                        5.9    11.27 )-----------( 200      ( 2018 05:24 )             18.2     04-02    144  |  107  |  80<H>  ----------------------------<  168<H>  4.1   |  22  |  5.01<H>    Ca    7.9<L>      2018 05:24  Phos  3.3     04-02  Mg     2.1     04-02        Urinalysis Basic - ( 2018 13:40 )    Color: YELLOW / Appearance: HAZY / S.019 / pH: 5.5  Gluc: NEGATIVE / Ketone: NEGATIVE  / Bili: NEGATIVE / Urobili: NORMAL mg/dL   Blood: SMALL / Protein: 100 mg/dL / Nitrite: NEGATIVE   Leuk Esterase: NEGATIVE / RBC: 5-10 / WBC 0-2   Sq Epi: x / Non Sq Epi: x / Bacteria: x

## 2018-04-02 NOTE — PROGRESS NOTE ADULT - PROBLEM SELECTOR PLAN 1
resolved  due to pulmonary edema and significant b/l pleural effusions from acute diastolic HF   s/p bumex gtt x3 days   c/w lasix 60mg  PO daily.

## 2018-04-02 NOTE — PROGRESS NOTE ADULT - SUBJECTIVE AND OBJECTIVE BOX
CHIEF COMPLAINT: Patient is a 63y old  male who presents with a chief complaint of 63M PMH DM, HTN, CKD p/w L foot pain x 3 days. (2018 10:39)      SUBJECTIVE / OVERNIGHT EVENTS:    Reports constant pain of his LLE after surgery. Also reports constipation. Denies SOB.    MEDICATIONS  (STANDING):  aspirin enteric coated 81 milliGRAM(s) Oral daily  atorvastatin 80 milliGRAM(s) Oral at bedtime  cyanocobalamin 1000 MICROGram(s) Oral daily  dextrose 50% Injectable 25 Gram(s) IV Push once  dextrose 50% Injectable 25 Gram(s) IV Push once  furosemide    Tablet 60 milliGRAM(s) Oral daily  heparin  Injectable 5000 Unit(s) SubCutaneous every 8 hours  influenza   Vaccine 0.5 milliLiter(s) IntraMuscular once  insulin lispro (HumaLOG) corrective regimen sliding scale   SubCutaneous three times a day before meals  labetalol 200 milliGRAM(s) Oral three times a day  multivitamin 1 Tablet(s) Oral daily  NIFEdipine XL 90 milliGRAM(s) Oral daily  pentoxifylline 400 milliGRAM(s) Oral three times a day  piperacillin/tazobactam IVPB. 3.375 Gram(s) IV Intermittent every 12 hours  polyethylene glycol 3350 17 Gram(s) Oral daily  silver sulfADIAZINE 1% Cream 1 Application(s) Topical daily  sodium chloride 0.9% lock flush 3 milliLiter(s) IV Push every 8 hours  tamsulosin 0.4 milliGRAM(s) Oral at bedtime    MEDICATIONS  (PRN):  acetaminophen   Tablet. 650 milliGRAM(s) Oral every 6 hours PRN Moderate Pain (4 - 6)  benzocaine 15 mG/menthol 3.6 mG Lozenge 1 Lozenge Oral every 2 hours PRN Sore Throat  dextrose Gel 1 Dose(s) Oral once PRN Blood Glucose LESS THAN 70 milliGRAM(s)/deciliter  glucagon  Injectable 1 milliGRAM(s) IntraMuscular once PRN Glucose LESS THAN 70 milligrams/deciliter  oxyCODONE    5 mG/acetaminophen 325 mG 2 Tablet(s) Oral every 8 hours PRN Severe Pain (7 - 10)  oxyCODONE    5 mG/acetaminophen 325 mG 1 Tablet(s) Oral every 4 hours PRN Moderate Pain      VITALS:  T(F): 97.7 (18 @ 14:14), Max: 98.2 (18 @ 05:47)  HR: 75 (18 @ 14:14) (73 - 82)  BP: 131/60 (18 @ 14:14) (110/52 - 139/64)  RR: 18 (18 @ 14:14) (16 - 19)  SpO2: 97% (18 @ 14:14)      CAPILLARY BLOOD GLUCOSE    Output     I&O's Summary  T(F): 97.7 (18 @ 14:14), Max: 98.2 (18 @ 05:47)  HR: 75 (18 @ 14:14) (73 - 82)  BP: 131/60 (18 @ 14:14) (110/52 - 139/64)  RR: 18 (18 @ 14:14) (16 - 19)  SpO2: 97% (18 @ 14:14)    PHYSICAL EXAM:  GENERAL: NAD, well-developed  HEAD:  Atraumatic, Normocephalic  EYES: EOMI  NECK: Supple, No JVD  CHEST/LUNG: scattered crackles at bilateral bases, otherwise CTAB  HEART: nl S1/S2  ABDOMEN: nondistended, soft  EXTREMITIES:  LLE in dressings  NEUROLOGY: non-focal  SKIN: No rashes noted    LABS:              8.2                  x    | x    | x            10.98 >-----------< 210     ------------------------< x                     25.1                 x    | x    | x                                            Ca x     Mg x     Ph x                  Urinalysis Basic - ( 2018 13:40 )    Color: YELLOW / Appearance: HAZY / S.019 / pH: 5.5  Gluc: NEGATIVE / Ketone: NEGATIVE  / Bili: NEGATIVE / Urobili: NORMAL mg/dL   Blood: SMALL / Protein: 100 mg/dL / Nitrite: NEGATIVE   Leuk Esterase: NEGATIVE / RBC: 5-10 / WBC 0-2   Sq Epi: x / Non Sq Epi: x / Bacteria: x            MICROBIOLOGY:    Culture - Urine (collected 2018 16:27)  Source: URINE MIDSTREAM  Preliminary Report (2018 10:28):    NO GROWTH TO DATE    Culture - Blood (collected 31 Mar 2018 22:42)  Source: BLOOD VENOUS  Preliminary Report (2018 22:42):    NO ORGANISMS ISOLATED    NO ORGANISMS ISOLATED AT 24 HOURS    Culture - Blood (collected 31 Mar 2018 22:42)  Source: BLOOD VENOUS  Preliminary Report (2018 22:42):    NO ORGANISMS ISOLATED    NO ORGANISMS ISOLATED AT 24 HOURS          RADIOLOGY & ADDITIONAL TESTS:    Imaging Personally Reviewed:    < from: Xray Chest 1 View- PORTABLE-Urgent (18 @ 22:38) >  IMPRESSION:  Bilateral layering pleural effusions.    < end of copied text >      [ x ] Consultant(s) Notes Reviewed: vascular, podiatry  [ ] Care Discussed with Consultants/Other Providers:

## 2018-04-02 NOTE — PROGRESS NOTE ADULT - ASSESSMENT
63y Male s/p left femoral to below knee popliteal artery bypass with left RSVG, jump graft from distal anastomosis to left PT using remaining RSV, doing well on floor    - continue cervantes, f/u UOP  - reg diet  - Strict I/O's  - DVT ppx  - IV antibiotics  - f/u PT consult  - transfuse 2 unit pRBC, check CBC after  - check FOBT since pt dropping Hct  - pods: no plan for surgical intervention, plan for outpt wound care      [id number] 63y Male s/p left femoral to below knee popliteal artery bypass with left RSVG, jump graft from distal anastomosis to left PT using remaining RSV, doing well on floor    - continue cervantes, f/u UOP  - reg diet  - Strict I/O's  - DVT ppx  - IV antibiotics  - f/u PT consult  - transfuse 2 unit pRBC, check CBC after  - check FOBT since pt dropping Hct

## 2018-04-02 NOTE — PROGRESS NOTE ADULT - ATTENDING COMMENTS
Patient with baseline CKD now with JANY on CKD which is multifactorial - pre-renal azotemia in the setting of diarrhea + contrast induced nephropathy + obstructive uropathy.    Cr relatively stable (rise in Cr is exaggerated at this level of GFR). Lytes stable. No indication for RRT. Cont supportive care. Strict I/O, monitor cervantes output. Check PVR. Avoid nephrotoxins. Patient with baseline CKD now with JANY on CKD which is multifactorial - pre-renal azotemia in the setting of diarrhea + contrast induced nephropathy + obstructive uropathy.    Cr relatively stable (rise in Cr is exaggerated at this level of GFR). Lytes stable. No indication for RRT. Cont supportive care. Strict I/O, monitor cervantes output. Check PVR. Avoid nephrotoxins. Check CBC with diff.

## 2018-04-02 NOTE — PROGRESS NOTE ADULT - PROBLEM/PLAN-3
DISPLAY PLAN FREE TEXT

## 2018-04-02 NOTE — PROGRESS NOTE ADULT - SUBJECTIVE AND OBJECTIVE BOX
[facility] DIVISION OF KIDNEY DISEASES AND HYPERTENSION -- FOLLOW UP NOTE  --------------------------------------------------------------------------------  García Blanchardahim     --------------------------------------------------------------------------------  Chief Complaint:    24 hour events/subjective:  billy grier for retention      PAST HISTORY  --------------------------------------------------------------------------------  No significant changes to PMH, PSH, FHx, SHx, unless otherwise noted    ALLERGIES & MEDICATIONS  --------------------------------------------------------------------------------  Allergies    No Known Allergies    Intolerances      Standing Inpatient Medications  aspirin enteric coated 81 milliGRAM(s) Oral daily  atorvastatin 80 milliGRAM(s) Oral at bedtime  cyanocobalamin 1000 MICROGram(s) Oral daily  dextrose 50% Injectable 25 Gram(s) IV Push once  dextrose 50% Injectable 25 Gram(s) IV Push once  furosemide    Tablet 60 milliGRAM(s) Oral daily  heparin  Injectable 5000 Unit(s) SubCutaneous every 8 hours  influenza   Vaccine 0.5 milliLiter(s) IntraMuscular once  insulin lispro (HumaLOG) corrective regimen sliding scale   SubCutaneous three times a day before meals  labetalol 200 milliGRAM(s) Oral three times a day  multivitamin 1 Tablet(s) Oral daily  NIFEdipine XL 90 milliGRAM(s) Oral daily  pentoxifylline 400 milliGRAM(s) Oral three times a day  piperacillin/tazobactam IVPB. 3.375 Gram(s) IV Intermittent every 12 hours  polyethylene glycol 3350 17 Gram(s) Oral daily  silver sulfADIAZINE 1% Cream 1 Application(s) Topical daily  sodium chloride 0.9% lock flush 3 milliLiter(s) IV Push every 8 hours  tamsulosin 0.4 milliGRAM(s) Oral at bedtime    PRN Inpatient Medications  acetaminophen   Tablet. 650 milliGRAM(s) Oral every 6 hours PRN  benzocaine 15 mG/menthol 3.6 mG Lozenge 1 Lozenge Oral every 2 hours PRN  dextrose Gel 1 Dose(s) Oral once PRN  glucagon  Injectable 1 milliGRAM(s) IntraMuscular once PRN  oxyCODONE    5 mG/acetaminophen 325 mG 2 Tablet(s) Oral every 8 hours PRN  oxyCODONE    5 mG/acetaminophen 325 mG 1 Tablet(s) Oral every 4 hours PRN      REVIEW OF SYSTEMS  --------------------------------------------------------------------------------  Review Of Systems:  Constitutional: [ ] Fever [ ] Chills [ ] Fatigue [ ] Weight change   HEENT: [ ] Blurred vision [ ] Eye Pain [ ] Headache [ ] Runny nose [ ] Sore Throat   Respiratory: [ ] Cough [ ] Wheezing [ ] Shortness of breath  Cardiovascular: [ ] Chest Pain [ ] Palpitations [ ] MENSAH [ ] PND [ ] Orthopnea  Gastrointestinal: [ ] Abdominal Pain [ ] Diarrhea [ ] Constipation [ ] Hemorrhoids [ ] Nausea [ ] Vomiting  Genitourinary: [ ] Nocturia [ ] Dysuria [ ] Incontinence  Extremities: [ ] Swelling [ ] Joint Pain  Neurologic: [ ] Focal deficit [ ] Paresthesias [ ] Syncope  Lymphatic: [ ] Swelling [ ] Lymphadenopathy   Skin: [ ] Rash [ ] Ecchymoses [ ] Wounds [ ] Lesions  Psychiatry: [ ] Depression [ ] Suicidal/Homicidal Ideation [ ] Anxiety [ ] Sleep Disturbances  [x ] 10 point review of systems is otherwise negative except as mentioned above       [ ]Unable to obtain    All other systems were reviewed and are negative, except as noted.    VITALS/PHYSICAL EXAM  --------------------------------------------------------------------------------  T(C): 36.5 (18 @ 14:14), Max: 36.8 (18 @ 05:47)  HR: 75 (18 @ 14:14) (73 - 82)  BP: 131/60 (18 @ 14:14) (110/52 - 139/64)  RR: 18 (18 @ 14:14) (16 - 19)  SpO2: 97% (18 @ 14:14) (94% - 97%)  Wt(kg): --      Daily     Daily Weight in k.5 (2018 01:24)  I&O's Summary    2018 07:  -  2018 07:00  --------------------------------------------------------  IN: 600 mL / OUT: 550 mL / NET: 50 mL    2018 07:  -  2018 15:35  --------------------------------------------------------  IN: 300 mL / OUT: 200 mL / NET: 100 mL          18 @ 07:01  -  18 @ 07:00  --------------------------------------------------------  IN: 600 mL / OUT: 550 mL / NET: 50 mL    18 @ 07:01  -  18 @ 15:35  --------------------------------------------------------  IN: 300 mL / OUT: 200 mL / NET: 100 mL      Physical Exam:              Gen: NAD   	HEENT: anicteric  	Pulm: CTA B/L   	CV: RRR  	Back: No dependent edema  	Abd: soft, nontender, nondistended  	:  billy  	LE: Warm, no edema  	Neuro: no asterixis  	Skin: Warm, without rashes  	Vascular access: none    LABS/STUDIES  --------------------------------------------------------------------------------              8.2    10.98 >-----------<  210      [18 @ 13:19]              25.1     144  |  107  |  80  ----------------------------<  168      [18 05:24]  4.1   |  22  |  5.01        Ca     7.9     [18 05:24]      Mg     2.1     [18 05:24]      Phos  3.3     [18 05:24]    Creatinine Trend:  SCr 5.01 [ 05:24]  SCr 4.54 [ 05:42]  SCr 4.41 [ 05:32]  SCr 4.07 [ @ 10:00]  SCr 3.82 [ 05:55]    Urinalysis - [18 @ 13:40]      Color YELLOW / Appearance HAZY / SG 1.019 / pH 5.5      Gluc NEGATIVE / Ketone NEGATIVE  / Bili NEGATIVE / Urobili NORMAL       Blood SMALL / Protein 100 / Leuk Est NEGATIVE / Nitrite NEGATIVE      RBC 5-10 / WBC 0-2 / Hyaline  / Gran  / Sq Epi  / Non Sq Epi  / Bacteria       Iron 21, TIBC 163, %sat --      [18 @ 05:00]  Ferritin 540.7      [18 @ 05:00]  HbA1c 5.9      [18 @ 07:11]  TSH 3.22      [18 @ 06:00]    Radiology  --------------------------------------------------------------------------------    --------------------------------------------------------------------------------  García Moreno

## 2018-04-02 NOTE — PROGRESS NOTE ADULT - PROBLEM SELECTOR PLAN 7
- hgb  to 8-9 to 5.9 this morning  - transfused 1 unit yesterday and 1 u today  - no obvious source of bleeding  - given CKD likely has anemia 2/2 CKD - would discuss with renal regarding TAINA  - monitor for melena, hematochezia  - f/u FOBT

## 2018-04-02 NOTE — PROGRESS NOTE ADULT - SUBJECTIVE AND OBJECTIVE BOX
pt seen at bedside in NAD. dressing to left foot c/d/i s/p bypass LLE  plantar foot wound noted slightly less moist plantar toes wounds dry and stable well adhered.  toe 3-5 eschars peeled off with red healthy tissue underneath  applied DSD  will dsc SSD and start collagenase and dakins with DSD daily  will need to closely monitor may require debridement or TMA if wounds become infected

## 2018-04-02 NOTE — PROGRESS NOTE ADULT - PROBLEM SELECTOR PLAN 1
Pt. with JANY on CKD in the setting of infection and diarrhea. CKD in the setting of long-standing DM. Scr was 1.5 in 3/2017, increased to 2.30 in 7/2017. Pt. also underwent vascular study/left leg angiogram on 3/8. Scr on admission (2/12) was elevated at 3.81.  peaked to 6.3 on 2/23.   Patient again with JANY On ckd. Cr started to rise from 4.07 to 4.41 from 3/30-31. labs vitals, meds and notes reviewed may have been a component of obstruction? however cervantes was placed 4/1 and despite intervention cr is rising   check cbc with diff,   would check PVR and document bladder volume despite cervantes placement.     Pt. non-oliguric. Monitor BMP and urine output. Avoid any potential nephrotoxins

## 2018-04-03 LAB
BACTERIA UR CULT: SIGNIFICANT CHANGE UP
BLD GP AB SCN SERPL QL: NEGATIVE — SIGNIFICANT CHANGE UP
BUN SERPL-MCNC: 81 MG/DL — HIGH (ref 7–23)
CALCIUM SERPL-MCNC: 8.1 MG/DL — LOW (ref 8.4–10.5)
CHLORIDE SERPL-SCNC: 106 MMOL/L — SIGNIFICANT CHANGE UP (ref 98–107)
CO2 SERPL-SCNC: 22 MMOL/L — SIGNIFICANT CHANGE UP (ref 22–31)
CREAT SERPL-MCNC: 4.94 MG/DL — HIGH (ref 0.5–1.3)
GLUCOSE BLDC GLUCOMTR-MCNC: 144 MG/DL — HIGH (ref 70–99)
GLUCOSE BLDC GLUCOMTR-MCNC: 150 MG/DL — HIGH (ref 70–99)
GLUCOSE BLDC GLUCOMTR-MCNC: 160 MG/DL — HIGH (ref 70–99)
GLUCOSE BLDC GLUCOMTR-MCNC: 163 MG/DL — HIGH (ref 70–99)
GLUCOSE SERPL-MCNC: 224 MG/DL — HIGH (ref 70–99)
HCT VFR BLD CALC: 25.3 % — LOW (ref 39–50)
HGB BLD-MCNC: 8.2 G/DL — LOW (ref 13–17)
MAGNESIUM SERPL-MCNC: 2.2 MG/DL — SIGNIFICANT CHANGE UP (ref 1.6–2.6)
MCHC RBC-ENTMCNC: 27.6 PG — SIGNIFICANT CHANGE UP (ref 27–34)
MCHC RBC-ENTMCNC: 32.4 % — SIGNIFICANT CHANGE UP (ref 32–36)
MCV RBC AUTO: 85.2 FL — SIGNIFICANT CHANGE UP (ref 80–100)
NRBC # FLD: 0.02 — SIGNIFICANT CHANGE UP
PHOSPHATE SERPL-MCNC: 3.4 MG/DL — SIGNIFICANT CHANGE UP (ref 2.5–4.5)
PLATELET # BLD AUTO: 222 K/UL — SIGNIFICANT CHANGE UP (ref 150–400)
PMV BLD: 11.9 FL — SIGNIFICANT CHANGE UP (ref 7–13)
POTASSIUM SERPL-MCNC: 4.5 MMOL/L — SIGNIFICANT CHANGE UP (ref 3.5–5.3)
POTASSIUM SERPL-SCNC: 4.5 MMOL/L — SIGNIFICANT CHANGE UP (ref 3.5–5.3)
RBC # BLD: 2.97 M/UL — LOW (ref 4.2–5.8)
RBC # FLD: 15.5 % — HIGH (ref 10.3–14.5)
RH IG SCN BLD-IMP: POSITIVE — SIGNIFICANT CHANGE UP
SODIUM SERPL-SCNC: 143 MMOL/L — SIGNIFICANT CHANGE UP (ref 135–145)
WBC # BLD: 9.46 K/UL — SIGNIFICANT CHANGE UP (ref 3.8–10.5)
WBC # FLD AUTO: 9.46 K/UL — SIGNIFICANT CHANGE UP (ref 3.8–10.5)

## 2018-04-03 PROCEDURE — 76775 US EXAM ABDO BACK WALL LIM: CPT | Mod: 26

## 2018-04-03 PROCEDURE — 99233 SBSQ HOSP IP/OBS HIGH 50: CPT

## 2018-04-03 RX ORDER — ONDANSETRON 8 MG/1
4 TABLET, FILM COATED ORAL ONCE
Refills: 0 | Status: COMPLETED | OUTPATIENT
Start: 2018-04-03 | End: 2018-04-03

## 2018-04-03 RX ADMIN — Medication 400 MILLIGRAM(S): at 05:43

## 2018-04-03 RX ADMIN — POLYETHYLENE GLYCOL 3350 17 GRAM(S): 17 POWDER, FOR SOLUTION ORAL at 11:47

## 2018-04-03 RX ADMIN — Medication 200 MILLIGRAM(S): at 05:43

## 2018-04-03 RX ADMIN — ATORVASTATIN CALCIUM 80 MILLIGRAM(S): 80 TABLET, FILM COATED ORAL at 21:15

## 2018-04-03 RX ADMIN — SODIUM CHLORIDE 3 MILLILITER(S): 9 INJECTION INTRAMUSCULAR; INTRAVENOUS; SUBCUTANEOUS at 21:16

## 2018-04-03 RX ADMIN — Medication 60 MILLIGRAM(S): at 05:43

## 2018-04-03 RX ADMIN — FAMOTIDINE 20 MILLIGRAM(S): 10 INJECTION INTRAVENOUS at 11:47

## 2018-04-03 RX ADMIN — Medication 1 TABLET(S): at 11:47

## 2018-04-03 RX ADMIN — HEPARIN SODIUM 5000 UNIT(S): 5000 INJECTION INTRAVENOUS; SUBCUTANEOUS at 13:38

## 2018-04-03 RX ADMIN — Medication 400 MILLIGRAM(S): at 13:38

## 2018-04-03 RX ADMIN — ONDANSETRON 4 MILLIGRAM(S): 8 TABLET, FILM COATED ORAL at 22:02

## 2018-04-03 RX ADMIN — PREGABALIN 1000 MICROGRAM(S): 225 CAPSULE ORAL at 11:47

## 2018-04-03 RX ADMIN — HEPARIN SODIUM 5000 UNIT(S): 5000 INJECTION INTRAVENOUS; SUBCUTANEOUS at 21:15

## 2018-04-03 RX ADMIN — Medication 90 MILLIGRAM(S): at 05:43

## 2018-04-03 RX ADMIN — Medication 400 MILLIGRAM(S): at 21:15

## 2018-04-03 RX ADMIN — Medication 2: at 09:52

## 2018-04-03 RX ADMIN — SODIUM CHLORIDE 3 MILLILITER(S): 9 INJECTION INTRAMUSCULAR; INTRAVENOUS; SUBCUTANEOUS at 13:38

## 2018-04-03 RX ADMIN — ONDANSETRON 4 MILLIGRAM(S): 8 TABLET, FILM COATED ORAL at 08:05

## 2018-04-03 RX ADMIN — ONDANSETRON 4 MILLIGRAM(S): 8 TABLET, FILM COATED ORAL at 14:02

## 2018-04-03 RX ADMIN — TAMSULOSIN HYDROCHLORIDE 0.4 MILLIGRAM(S): 0.4 CAPSULE ORAL at 21:15

## 2018-04-03 RX ADMIN — Medication 200 MILLIGRAM(S): at 13:38

## 2018-04-03 RX ADMIN — SODIUM CHLORIDE 3 MILLILITER(S): 9 INJECTION INTRAMUSCULAR; INTRAVENOUS; SUBCUTANEOUS at 05:42

## 2018-04-03 RX ADMIN — Medication 200 MILLIGRAM(S): at 21:15

## 2018-04-03 RX ADMIN — ONDANSETRON 4 MILLIGRAM(S): 8 TABLET, FILM COATED ORAL at 04:27

## 2018-04-03 RX ADMIN — Medication 81 MILLIGRAM(S): at 11:47

## 2018-04-03 RX ADMIN — HEPARIN SODIUM 5000 UNIT(S): 5000 INJECTION INTRAVENOUS; SUBCUTANEOUS at 05:42

## 2018-04-03 RX ADMIN — PIPERACILLIN AND TAZOBACTAM 25 GRAM(S): 4; .5 INJECTION, POWDER, LYOPHILIZED, FOR SOLUTION INTRAVENOUS at 17:51

## 2018-04-03 RX ADMIN — PIPERACILLIN AND TAZOBACTAM 25 GRAM(S): 4; .5 INJECTION, POWDER, LYOPHILIZED, FOR SOLUTION INTRAVENOUS at 05:44

## 2018-04-03 NOTE — PROGRESS NOTE ADULT - ASSESSMENT
63y Male s/p left femoral to below knee popliteal artery bypass with left RSVG, jump graft from distal anastomosis to left PT using remaining RSV, doing well on floor    - continue cervantes, f/u UOP  - reg diet  - Strict I/O's  - DVT ppx  - IV antibiotics  - f/u PT consult  - transfuse 2 unit pRBC, check CBC after  - check FOBT since pt dropping Hct

## 2018-04-03 NOTE — PROGRESS NOTE ADULT - SUBJECTIVE AND OBJECTIVE BOX
CHIEF COMPLAINT: Patient is a 63y old  male who presents with a chief complaint of 63M PMH DM, HTN, CKD p/w L foot pain x 3 days. (19 Feb 2018 10:39)      SUBJECTIVE / OVERNIGHT EVENTS:    Complains of LLE pain. Reports poor vision. Denies CP. Denies abdominal pain. Complains of hiccups. Noted to be vomiting on returning to room. Denies back pain.    MEDICATIONS  (STANDING):  aspirin enteric coated 81 milliGRAM(s) Oral daily  atorvastatin 80 milliGRAM(s) Oral at bedtime  collagenase Ointment 1 Application(s) Topical daily  cyanocobalamin 1000 MICROGram(s) Oral daily  Dakins Solution - 1/2 Strength 1 Application(s) Topical daily  dextrose 50% Injectable 25 Gram(s) IV Push once  dextrose 50% Injectable 25 Gram(s) IV Push once  famotidine    Tablet 20 milliGRAM(s) Oral daily  furosemide    Tablet 60 milliGRAM(s) Oral daily  heparin  Injectable 5000 Unit(s) SubCutaneous every 8 hours  insulin lispro (HumaLOG) corrective regimen sliding scale   SubCutaneous three times a day before meals  labetalol 200 milliGRAM(s) Oral three times a day  multivitamin 1 Tablet(s) Oral daily  NIFEdipine XL 90 milliGRAM(s) Oral daily  pentoxifylline 400 milliGRAM(s) Oral three times a day  piperacillin/tazobactam IVPB. 3.375 Gram(s) IV Intermittent every 12 hours  polyethylene glycol 3350 17 Gram(s) Oral daily  sodium chloride 0.9% lock flush 3 milliLiter(s) IV Push every 8 hours  tamsulosin 0.4 milliGRAM(s) Oral at bedtime    MEDICATIONS  (PRN):  acetaminophen   Tablet. 650 milliGRAM(s) Oral every 6 hours PRN Moderate Pain (4 - 6)  benzocaine 15 mG/menthol 3.6 mG Lozenge 1 Lozenge Oral every 2 hours PRN Sore Throat  dextrose Gel 1 Dose(s) Oral once PRN Blood Glucose LESS THAN 70 milliGRAM(s)/deciliter  glucagon  Injectable 1 milliGRAM(s) IntraMuscular once PRN Glucose LESS THAN 70 milligrams/deciliter  oxyCODONE    5 mG/acetaminophen 325 mG 2 Tablet(s) Oral every 8 hours PRN Severe Pain (7 - 10)  oxyCODONE    5 mG/acetaminophen 325 mG 1 Tablet(s) Oral every 4 hours PRN Moderate Pain      VITALS:  T(F): 98.2 (04-03-18 @ 13:35), Max: 98.5 (04-02-18 @ 21:26)  HR: 78 (04-03-18 @ 13:35) (74 - 78)  BP: 140/61 (04-03-18 @ 13:35) (106/50 - 145/61)  RR: 19 (04-03-18 @ 13:35) (18 - 20)  SpO2: 99% (04-03-18 @ 13:35)      CAPILLARY BLOOD GLUCOSE    Output     I&O's Summary  T(F): 98.2 (04-03-18 @ 13:35), Max: 98.5 (04-02-18 @ 21:26)  HR: 78 (04-03-18 @ 13:35) (74 - 78)  BP: 140/61 (04-03-18 @ 13:35) (106/50 - 145/61)  RR: 19 (04-03-18 @ 13:35) (18 - 20)  SpO2: 99% (04-03-18 @ 13:35)    PHYSICAL EXAM:  GENERAL: NAD, well-developed  HEAD:  Atraumatic, Normocephalic  EYES: EOMI  NECK: Supple, No JVD  CHEST/LUNG: nonlabored breathing  HEART: nl S1/S2  ABDOMEN: nondistended, soft  EXTREMITIES:  large LLE surgical incision with staples in place, RLE stump  NEUROLOGY: non-focal  SKIN: No rashes noted    LABS:              8.2                  143  | 22   | 81           9.46  >-----------< 222     ------------------------< 224                   25.3                 4.5  | 106  | 4.94                                         Ca 8.1   Mg 2.2   Ph 3.4                        MICROBIOLOGY:    Culture - Urine (collected 01 Apr 2018 16:27)  Source: URINE MIDSTREAM  Final Report (03 Apr 2018 10:40):    NO GROWTH AT 24 HOURS    Culture - Blood (collected 31 Mar 2018 22:42)  Source: BLOOD VENOUS  Preliminary Report (02 Apr 2018 22:42):    NO ORGANISMS ISOLATED    NO ORGANISMS ISOLATED AT 48 HRS.    Culture - Blood (collected 31 Mar 2018 22:42)  Source: BLOOD VENOUS  Preliminary Report (02 Apr 2018 22:42):    NO ORGANISMS ISOLATED    NO ORGANISMS ISOLATED AT 48 HRS.          RADIOLOGY & ADDITIONAL TESTS:    Imaging Personally Reviewed:    < from:  Renal (04.03.18 @ 09:23) >    IMPRESSION:     No hydronephrosis of either kidney.    Bilateral pleural effusions.    < end of copied text >      [ x ] Consultant(s) Notes Reviewed: vascular, nephrology  [ x ] Care Discussed with Consultants/Other Providers: vascular

## 2018-04-03 NOTE — PROGRESS NOTE ADULT - ASSESSMENT
63 y.o. Male w/ hx DM, HTN, CKD stage 4, PVD s/p Rt BKA in 2009 p/w sepsis 2/2 cellulitis and LLE SFA disease was to have Lt fem-pop bypass but delayed due to acute hypoxic respiratory failure 2/2 fluid overload from acute diastolic CHF. Acute diastolic HF now resolved after bumex gtt now s/p  left femoral to below knee popliteal artery bypass with left RSVG, jump graft from distal anastomosis to left PT using remaining RSVG on 3/29      *Anemia: possible acute blood loss anemia in setting of recent surgery. No melena or visible blood loss. Transfuse 2 U prbc's over past 2 days with appropriate rise in hgb  - per discussion with vascular team, stool was hemoccult negative  - monitor hgb  - consider CT A/P noncontrast to r/o internal hemorrhage, e.g. RP bleed  - would discuss with renal regarding epo      *Acute respiratory failure with hypoxia  - s/p bumex gtt x3 days   - c/w lasix 60mg  PO daily  - cardiology consulted    *ATN (acute tubular necrosis)  - Creatinine stable CKD IV in the setting of  ATN from sepsis & contrast induced nephropathy   - trend creatitine daily   - renal consulted  - renal US negative for hydronephrosis  - avoid nephrotoxins    *Wound infection  - Left foot with cellulitis with resulting sepsis now resolved.  - S/p vancomycin and zosyn. Completed Unasyn on 2/22.     *Peripheral arterial disease  - s/p angiogram with multi vessel disease s/p LE dopplers & vein mapping  KENN .71 on left.   - s/p Femoral-popliteal bypass 3/29.   - management as per vascular  - c/w ASA and statin.     *Hypertension, unspecified type  - BPs stable  - c/w Nifedipine and labetalol.     *Type 2 diabetes mellitus with complication, without long-term current use of insulin  - Pt on oral medications at home. Well controlled  - c/w ISS while inpatient, will need to adjust oral meds upon dc given new baseline renal function.

## 2018-04-03 NOTE — PROGRESS NOTE ADULT - SUBJECTIVE AND OBJECTIVE BOX
Vascular Surgery    SUBJECTIVE: Patient seen and examined at bedside, patient without complaints. Some mild nausea overnight which patient received zofran    Vital Signs Last 24 Hrs  T(C): 36.4 (2018 10:30), Max: 36.9 (2018 21:26)  T(F): 97.5 (2018 10:30), Max: 98.5 (2018 21:26)  HR: 80 (2018 10:30) (74 - 80)  BP: 106/60 (2018 10:30) (106/50 - 145/61)  BP(mean): --  RR: 20 (2018 10:30) (18 - 20)  SpO2: 96% (2018 10:30) (96% - 99%)  I&O's Detail    2018 07:01  -  2018 07:00  --------------------------------------------------------  IN:    IV PiggyBack: 100 mL    Packed Red Blood Cells: 300 mL  Total IN: 400 mL    OUT:    Emesis: 250 mL    Indwelling Catheter - Urethral: 900 mL  Total OUT: 1150 mL    Total NET: -750 mL      2018 07:01  -  2018 12:11  --------------------------------------------------------  IN:  Total IN: 0 mL    OUT:    Indwelling Catheter - Urethral: 200 mL  Total OUT: 200 mL    Total NET: -200 mL        MEDICATIONS  (STANDING):  aspirin enteric coated 81 milliGRAM(s) Oral daily  atorvastatin 80 milliGRAM(s) Oral at bedtime  collagenase Ointment 1 Application(s) Topical daily  cyanocobalamin 1000 MICROGram(s) Oral daily  Dakins Solution - 1/2 Strength 1 Application(s) Topical daily  dextrose 50% Injectable 25 Gram(s) IV Push once  dextrose 50% Injectable 25 Gram(s) IV Push once  famotidine    Tablet 20 milliGRAM(s) Oral daily  furosemide    Tablet 60 milliGRAM(s) Oral daily  heparin  Injectable 5000 Unit(s) SubCutaneous every 8 hours  insulin lispro (HumaLOG) corrective regimen sliding scale   SubCutaneous three times a day before meals  labetalol 200 milliGRAM(s) Oral three times a day  multivitamin 1 Tablet(s) Oral daily  NIFEdipine XL 90 milliGRAM(s) Oral daily  pentoxifylline 400 milliGRAM(s) Oral three times a day  piperacillin/tazobactam IVPB. 3.375 Gram(s) IV Intermittent every 12 hours  polyethylene glycol 3350 17 Gram(s) Oral daily  sodium chloride 0.9% lock flush 3 milliLiter(s) IV Push every 8 hours  tamsulosin 0.4 milliGRAM(s) Oral at bedtime    MEDICATIONS  (PRN):  acetaminophen   Tablet. 650 milliGRAM(s) Oral every 6 hours PRN Moderate Pain (4 - 6)  benzocaine 15 mG/menthol 3.6 mG Lozenge 1 Lozenge Oral every 2 hours PRN Sore Throat  dextrose Gel 1 Dose(s) Oral once PRN Blood Glucose LESS THAN 70 milliGRAM(s)/deciliter  glucagon  Injectable 1 milliGRAM(s) IntraMuscular once PRN Glucose LESS THAN 70 milligrams/deciliter  oxyCODONE    5 mG/acetaminophen 325 mG 2 Tablet(s) Oral every 8 hours PRN Severe Pain (7 - 10)  oxyCODONE    5 mG/acetaminophen 325 mG 1 Tablet(s) Oral every 4 hours PRN Moderate Pain    PE:  Gen: alert, NAD  Pulm: airway patent, non-labored respirations  Ext: L ext dressing c/d/i, s/p R BKA, L palpable PT pulse, L DP signal incision stable       LABS:                        8.2    9.46  )-----------( 222      ( 2018 05:55 )             25.3     04-03    143  |  106  |  81<H>  ----------------------------<  224<H>  4.5   |  22  |  4.94<H>    Ca    8.1<L>      2018 05:55  Phos  3.4     04-03  Mg     2.2     04-03        Urinalysis Basic - ( 2018 13:40 )    Color: YELLOW / Appearance: HAZY / S.019 / pH: 5.5  Gluc: NEGATIVE / Ketone: NEGATIVE  / Bili: NEGATIVE / Urobili: NORMAL mg/dL   Blood: SMALL / Protein: 100 mg/dL / Nitrite: NEGATIVE   Leuk Esterase: NEGATIVE / RBC: 5-10 / WBC 0-2   Sq Epi: x / Non Sq Epi: x / Bacteria: x      ABO Interpretation: B (18 @ 06:00)      63y Male s/p left femoral to below knee popliteal artery bypass with left RSVG, jump graft from distal anastomosis to left PT using remaining RSV, doing well on floor    - continue cervantes, f/u UOP  - reg diet  - Strict I/O's  - DVT ppx  - IV antibiotics  - f/u PT consult  -f/u nephrology for Creatinine level  -f/u renal US to r/o hydronephrosis   -d/w attending   -d/c planning for rehab

## 2018-04-04 LAB
GLUCOSE BLDC GLUCOMTR-MCNC: 113 MG/DL — HIGH (ref 70–99)
GLUCOSE BLDC GLUCOMTR-MCNC: 128 MG/DL — HIGH (ref 70–99)
GLUCOSE BLDC GLUCOMTR-MCNC: 129 MG/DL — HIGH (ref 70–99)
GLUCOSE BLDC GLUCOMTR-MCNC: 176 MG/DL — HIGH (ref 70–99)

## 2018-04-04 PROCEDURE — 71045 X-RAY EXAM CHEST 1 VIEW: CPT | Mod: 26

## 2018-04-04 PROCEDURE — 93010 ELECTROCARDIOGRAM REPORT: CPT

## 2018-04-04 PROCEDURE — 99233 SBSQ HOSP IP/OBS HIGH 50: CPT

## 2018-04-04 RX ADMIN — PIPERACILLIN AND TAZOBACTAM 25 GRAM(S): 4; .5 INJECTION, POWDER, LYOPHILIZED, FOR SOLUTION INTRAVENOUS at 17:56

## 2018-04-04 RX ADMIN — Medication 400 MILLIGRAM(S): at 05:36

## 2018-04-04 RX ADMIN — Medication 200 MILLIGRAM(S): at 21:27

## 2018-04-04 RX ADMIN — Medication 60 MILLIGRAM(S): at 05:36

## 2018-04-04 RX ADMIN — Medication 200 MILLIGRAM(S): at 05:36

## 2018-04-04 RX ADMIN — Medication 90 MILLIGRAM(S): at 05:36

## 2018-04-04 RX ADMIN — HEPARIN SODIUM 5000 UNIT(S): 5000 INJECTION INTRAVENOUS; SUBCUTANEOUS at 13:26

## 2018-04-04 RX ADMIN — ATORVASTATIN CALCIUM 80 MILLIGRAM(S): 80 TABLET, FILM COATED ORAL at 21:27

## 2018-04-04 RX ADMIN — TAMSULOSIN HYDROCHLORIDE 0.4 MILLIGRAM(S): 0.4 CAPSULE ORAL at 21:27

## 2018-04-04 RX ADMIN — SODIUM CHLORIDE 3 MILLILITER(S): 9 INJECTION INTRAMUSCULAR; INTRAVENOUS; SUBCUTANEOUS at 21:25

## 2018-04-04 RX ADMIN — PIPERACILLIN AND TAZOBACTAM 25 GRAM(S): 4; .5 INJECTION, POWDER, LYOPHILIZED, FOR SOLUTION INTRAVENOUS at 05:36

## 2018-04-04 RX ADMIN — SODIUM CHLORIDE 3 MILLILITER(S): 9 INJECTION INTRAMUSCULAR; INTRAVENOUS; SUBCUTANEOUS at 05:39

## 2018-04-04 RX ADMIN — PREGABALIN 1000 MICROGRAM(S): 225 CAPSULE ORAL at 11:38

## 2018-04-04 RX ADMIN — SODIUM CHLORIDE 3 MILLILITER(S): 9 INJECTION INTRAMUSCULAR; INTRAVENOUS; SUBCUTANEOUS at 14:11

## 2018-04-04 RX ADMIN — FAMOTIDINE 20 MILLIGRAM(S): 10 INJECTION INTRAVENOUS at 11:38

## 2018-04-04 RX ADMIN — Medication 81 MILLIGRAM(S): at 11:38

## 2018-04-04 RX ADMIN — Medication 2: at 13:23

## 2018-04-04 RX ADMIN — HEPARIN SODIUM 5000 UNIT(S): 5000 INJECTION INTRAVENOUS; SUBCUTANEOUS at 05:35

## 2018-04-04 RX ADMIN — Medication 1 TABLET(S): at 11:38

## 2018-04-04 RX ADMIN — Medication 200 MILLIGRAM(S): at 13:26

## 2018-04-04 RX ADMIN — Medication 400 MILLIGRAM(S): at 13:26

## 2018-04-04 RX ADMIN — Medication 400 MILLIGRAM(S): at 21:27

## 2018-04-04 RX ADMIN — HEPARIN SODIUM 5000 UNIT(S): 5000 INJECTION INTRAVENOUS; SUBCUTANEOUS at 21:27

## 2018-04-04 RX ADMIN — Medication 1 APPLICATION(S): at 11:37

## 2018-04-04 NOTE — PROGRESS NOTE ADULT - ASSESSMENT
63y Male s/p left femoral to below knee popliteal artery bypass with left RSVG, jump graft from distal anastomosis to left PT using remaining RSV, doing well on floor    - continue cervantes, f/u UOP  - reg diet  - Strict I/O's  - DVT ppx  - IV antibiotics  -f/u nephrology for Creatinine level  -f/u renal US to r/o hydronephrosis   -d/w attending   - dispo: ECTOR 63y Male s/p left femoral to below knee popliteal artery bypass with left RSVG, jump graft from distal anastomosis to left PT using remaining RSV, doing well on floor    - continue cervantes, f/u UOP  - reg diet  - Strict I/O's  - DVT ppx  - IV antibiotics  -f/u nephrology for Creatinine level  -f/u renal US to r/o hydronephrosis   -d/w attending   - dispo: [facility]  podiatry input noted

## 2018-04-04 NOTE — PROGRESS NOTE ADULT - SUBJECTIVE AND OBJECTIVE BOX
CHIEF COMPLAINT: Patient is a 63y old  male who presents with a chief complaint of 63M PMH DM, HTN, CKD p/w L foot pain x 3 days. (19 Feb 2018 10:39)      SUBJECTIVE / OVERNIGHT EVENTS:    Continue to complain of persistent LLE pain. Also complaints of continued hiccups, started 2-3 days ago. Denies N/V today. Ate breakfast without issue. Denies CP, abdominal pain, SOB.    MEDICATIONS  (STANDING):  aspirin enteric coated 81 milliGRAM(s) Oral daily  atorvastatin 80 milliGRAM(s) Oral at bedtime  collagenase Ointment 1 Application(s) Topical daily  cyanocobalamin 1000 MICROGram(s) Oral daily  Dakins Solution - 1/2 Strength 1 Application(s) Topical daily  dextrose 50% Injectable 25 Gram(s) IV Push once  dextrose 50% Injectable 25 Gram(s) IV Push once  famotidine    Tablet 20 milliGRAM(s) Oral daily  furosemide    Tablet 60 milliGRAM(s) Oral daily  heparin  Injectable 5000 Unit(s) SubCutaneous every 8 hours  insulin lispro (HumaLOG) corrective regimen sliding scale   SubCutaneous three times a day before meals  labetalol 200 milliGRAM(s) Oral three times a day  multivitamin 1 Tablet(s) Oral daily  NIFEdipine XL 90 milliGRAM(s) Oral daily  pentoxifylline 400 milliGRAM(s) Oral three times a day  piperacillin/tazobactam IVPB. 3.375 Gram(s) IV Intermittent every 12 hours  polyethylene glycol 3350 17 Gram(s) Oral daily  sodium chloride 0.9% lock flush 3 milliLiter(s) IV Push every 8 hours  tamsulosin 0.4 milliGRAM(s) Oral at bedtime    MEDICATIONS  (PRN):  acetaminophen   Tablet. 650 milliGRAM(s) Oral every 6 hours PRN Moderate Pain (4 - 6)  benzocaine 15 mG/menthol 3.6 mG Lozenge 1 Lozenge Oral every 2 hours PRN Sore Throat  dextrose Gel 1 Dose(s) Oral once PRN Blood Glucose LESS THAN 70 milliGRAM(s)/deciliter  glucagon  Injectable 1 milliGRAM(s) IntraMuscular once PRN Glucose LESS THAN 70 milligrams/deciliter  oxyCODONE    5 mG/acetaminophen 325 mG 2 Tablet(s) Oral every 8 hours PRN Severe Pain (7 - 10)  oxyCODONE    5 mG/acetaminophen 325 mG 1 Tablet(s) Oral every 4 hours PRN Moderate Pain      VITALS:  T(F): 97.9 (04-04-18 @ 13:28), Max: 97.9 (04-04-18 @ 13:28)  HR: 74 (04-04-18 @ 13:28) (64 - 78)  BP: 151/67 (04-04-18 @ 13:28) (134/66 - 151/67)  RR: 16 (04-04-18 @ 13:28) (16 - 20)  SpO2: 94% (04-04-18 @ 13:28)      CAPILLARY BLOOD GLUCOSE    Output     I&O's Summary  T(F): 97.9 (04-04-18 @ 13:28), Max: 97.9 (04-04-18 @ 13:28)  HR: 74 (04-04-18 @ 13:28) (64 - 78)  BP: 151/67 (04-04-18 @ 13:28) (134/66 - 151/67)  RR: 16 (04-04-18 @ 13:28) (16 - 20)  SpO2: 94% (04-04-18 @ 13:28)    PHYSICAL EXAM:  GENERAL: NAD, well-developed  HEAD:  Atraumatic, Normocephalic  EYES: EOMI  NECK: Supple, No JVD  CHEST/LUNG: nonlabored breathing  HEART: nl S1/S2  ABDOMEN: nondistended, soft, nontender to deep palpation  EXTREMITIES:  no LE edema  PSYCH: alert, answering questions appropriately  NEUROLOGY: non-focal  SKIN: No rashes noted    LABS:              8.2                  143  | 22   | 81           9.46  >-----------< 222     ------------------------< 224                   25.3                 4.5  | 106  | 4.94                                         Ca 8.1   Mg 2.2   Ph 3.4                        MICROBIOLOGY:    Culture - Urine (collected 01 Apr 2018 16:27)  Source: URINE MIDSTREAM  Final Report (03 Apr 2018 10:40):    NO GROWTH AT 24 HOURS          RADIOLOGY & ADDITIONAL TESTS:    Imaging Personally Reviewed:    < from: Xray Chest 1 View- PORTABLE-Routine (04.04.18 @ 07:54) >  IMPRESSION:   Bilateral layering pleural effusions.    < end of copied text >      [ x ] Consultant(s) Notes Reviewed: renal, vascular, podiatry    [ x ] Care Discussed with Consultants/Other Providers: vascular

## 2018-04-04 NOTE — PROGRESS NOTE ADULT - SUBJECTIVE AND OBJECTIVE BOX
GENERAL SURGERY DAILY PROGRESS NOTE:       Subjective:  Pt complaining of alot of hiccups. No N/V. Pt attempted breath holding and drinking ice water without success. QTc prolonged on EKG thus cant give reglan or thorazine. Ortega removed yesterday then re-inserted for retention. UOP adequate.         Objective:    PE:  Gen: alert, NAD  Pulm: airway patent, non-labored respirations  Ext: L ext dressing c/d/i, s/p R BKA, L palpable PT pulse, L DP signal incision stable       Vital Signs Last 24 Hrs  T(C): 36.5 (2018 10:12), Max: 36.8 (2018 13:35)  T(F): 97.7 (2018 10:12), Max: 98.2 (2018 13:35)  HR: 72 (2018 10:12) (64 - 78)  BP: 139/63 (2018 10:12) (134/66 - 146/65)  BP(mean): --  RR: 20 (2018 10:12) (16 - 20)  SpO2: 97% (2018 10:12) (95% - 99%)    I&O's Detail    2018 07:01  -  2018 07:00  --------------------------------------------------------  IN:  Total IN: 0 mL    OUT:    Emesis: 100 mL    Indwelling Catheter - Urethral: 1150 mL  Total OUT: 1250 mL    Total NET: -1250 mL          Daily     Daily Weight in k.7 (2018 01:25)    MEDICATIONS  (STANDING):  aspirin enteric coated 81 milliGRAM(s) Oral daily  atorvastatin 80 milliGRAM(s) Oral at bedtime  collagenase Ointment 1 Application(s) Topical daily  cyanocobalamin 1000 MICROGram(s) Oral daily  Dakins Solution - 1/2 Strength 1 Application(s) Topical daily  dextrose 50% Injectable 25 Gram(s) IV Push once  dextrose 50% Injectable 25 Gram(s) IV Push once  famotidine    Tablet 20 milliGRAM(s) Oral daily  furosemide    Tablet 60 milliGRAM(s) Oral daily  heparin  Injectable 5000 Unit(s) SubCutaneous every 8 hours  insulin lispro (HumaLOG) corrective regimen sliding scale   SubCutaneous three times a day before meals  labetalol 200 milliGRAM(s) Oral three times a day  multivitamin 1 Tablet(s) Oral daily  NIFEdipine XL 90 milliGRAM(s) Oral daily  pentoxifylline 400 milliGRAM(s) Oral three times a day  piperacillin/tazobactam IVPB. 3.375 Gram(s) IV Intermittent every 12 hours  polyethylene glycol 3350 17 Gram(s) Oral daily  sodium chloride 0.9% lock flush 3 milliLiter(s) IV Push every 8 hours  tamsulosin 0.4 milliGRAM(s) Oral at bedtime    MEDICATIONS  (PRN):  acetaminophen   Tablet. 650 milliGRAM(s) Oral every 6 hours PRN Moderate Pain (4 - 6)  benzocaine 15 mG/menthol 3.6 mG Lozenge 1 Lozenge Oral every 2 hours PRN Sore Throat  dextrose Gel 1 Dose(s) Oral once PRN Blood Glucose LESS THAN 70 milliGRAM(s)/deciliter  glucagon  Injectable 1 milliGRAM(s) IntraMuscular once PRN Glucose LESS THAN 70 milligrams/deciliter  oxyCODONE    5 mG/acetaminophen 325 mG 2 Tablet(s) Oral every 8 hours PRN Severe Pain (7 - 10)  oxyCODONE    5 mG/acetaminophen 325 mG 1 Tablet(s) Oral every 4 hours PRN Moderate Pain      LABS:                        8.2    9.46  )-----------( 222      ( 2018 05:55 )             25.3     04-03    143  |  106  |  81<H>  ----------------------------<  224<H>  4.5   |  22  |  4.94<H>    Ca    8.1<L>      2018 05:55  Phos  3.4     04-03  Mg     2.2      GENERAL SURGERY DAILY PROGRESS NOTE:       Subjective:  Pt complaining of alot of hiccups. No N/V. Pt attempted breath holding and drinking ice water without success. QTc prolonged on EKG thus cant give reglan or thorazine. Ortega removed yesterday then re-inserted for retention. UOP adequate.         Objective: pt w/o c/o    PE:  Gen: alert, NAD  Pulm: airway patent, non-labored respirations  Ext: L ext dressing c/d/i, s/p R BKA, L palpable PT pulse, L DP signal incision stable  forefoot plantar aspect and  toes dorsum remain mummified      Vital Signs Last 24 Hrs  T(C): 36.5 (2018 10:12), Max: 36.8 (2018 13:35)  T(F): 97.7 (2018 10:12), Max: 98.2 (2018 13:35)  HR: 72 (2018 10:12) (64 - 78)  BP: 139/63 (2018 10:12) (134/66 - 146/65)  BP(mean): --  RR: 20 (2018 10:12) (16 - 20)  SpO2: 97% (2018 10:12) (95% - 99%)    I&O's Detail    2018 07:01  -  2018 07:00  --------------------------------------------------------  IN:  Total IN: 0 mL    OUT:    Emesis: 100 mL    Indwelling Catheter - Urethral: 1150 mL  Total OUT: 1250 mL    Total NET: -1250 mL    Daily Weight in k.7 (2018 01:25)    MEDICATIONS  (STANDING):  aspirin enteric coated 81 milliGRAM(s) Oral daily  atorvastatin 80 milliGRAM(s) Oral at bedtime  collagenase Ointment 1 Application(s) Topical daily  cyanocobalamin 1000 MICROGram(s) Oral daily  Dakins Solution - 1/2 Strength 1 Application(s) Topical daily  dextrose 50% Injectable 25 Gram(s) IV Push once  dextrose 50% Injectable 25 Gram(s) IV Push once  famotidine    Tablet 20 milliGRAM(s) Oral daily  furosemide    Tablet 60 milliGRAM(s) Oral daily  heparin  Injectable 5000 Unit(s) SubCutaneous every 8 hours  insulin lispro (HumaLOG) corrective regimen sliding scale   SubCutaneous three times a day before meals  labetalol 200 milliGRAM(s) Oral three times a day  multivitamin 1 Tablet(s) Oral daily  NIFEdipine XL 90 milliGRAM(s) Oral daily  pentoxifylline 400 milliGRAM(s) Oral three times a day  piperacillin/tazobactam IVPB. 3.375 Gram(s) IV Intermittent every 12 hours  polyethylene glycol 3350 17 Gram(s) Oral daily  sodium chloride 0.9% lock flush 3 milliLiter(s) IV Push every 8 hours  tamsulosin 0.4 milliGRAM(s) Oral at bedtime    MEDICATIONS  (PRN):  acetaminophen   Tablet. 650 milliGRAM(s) Oral every 6 hours PRN Moderate Pain (4 - 6)  benzocaine 15 mG/menthol 3.6 mG Lozenge 1 Lozenge Oral every 2 hours PRN Sore Throat  dextrose Gel 1 Dose(s) Oral once PRN Blood Glucose LESS THAN 70 milliGRAM(s)/deciliter  glucagon  Injectable 1 milliGRAM(s) IntraMuscular once PRN Glucose LESS THAN 70 milligrams/deciliter  oxyCODONE    5 mG/acetaminophen 325 mG 2 Tablet(s) Oral every 8 hours PRN Severe Pain (7 - 10)  oxyCODONE    5 mG/acetaminophen 325 mG 1 Tablet(s) Oral every 4 hours PRN Moderate Pain      LABS:                        8.2    9.46  )-----------( 222      ( 2018 05:55 )             25.3     04-03    143  |  106  |  81<H>  ----------------------------<  224<H>  4.5   |  22  |  4.94<H>    Ca    8.1<L>      2018 05:55  Phos  3.4     04-03  Mg     2.2

## 2018-04-04 NOTE — PROGRESS NOTE ADULT - ASSESSMENT
63 y.o. Male w/ hx DM, HTN, CKD stage 4, PVD s/p Rt BKA in 2009 p/w sepsis 2/2 cellulitis and LLE SFA disease was to have Lt fem-pop bypass but delayed due to acute hypoxic respiratory failure 2/2 fluid overload from acute diastolic CHF. Acute diastolic HF now resolved after bumex gtt now s/p  left femoral to below knee popliteal artery bypass with left RSVG, jump graft from distal anastomosis to left PT using remaining RSVG on 3/29    *Anemia: possible acute blood loss anemia in setting of recent surgery. No melena or visible blood loss. Transfuse 2 U prbc's over past 2 days with appropriate rise in hgb  - per discussion with vascular team, stool was hemoccult negative  - monitor hgb  - would discuss with renal regarding epo    *Acute respiratory failure with hypoxia  - s/p bumex gtt x3 days   - c/w lasix 60mg  PO daily  - cardiology consulted    *ATN (acute tubular necrosis)  - renal consulted  - Creatinine stable CKD IV in the setting of ATN from sepsis & contrast induced nephropathy   - renal US negative for hydronephrosis  - avoid nephrotoxins    *Wound infection  - Left foot with cellulitis with resulting sepsis now resolved.  - S/p vancomycin and zosyn. Completed Unasyn on 2/22    *Peripheral arterial disease  - s/p angiogram with multi vessel disease s/p LE dopplers & vein mapping  KENN .71 on left.   - s/p Femoral-popliteal bypass 3/29.   - management as per vascular  - c/w ASA and statin.     *Hypertension, unspecified type  - BPs stable  - c/w Nifedipine and labetalol    *Type 2 diabetes mellitus with complication, without long-term current use of insulin  - Pt on oral medications at home. Well controlled  - c/w ISS while inpatient, will need to adjust oral meds upon dc given new baseline renal function    *BPH: cont tamsulosin

## 2018-04-04 NOTE — CHART NOTE - NSCHARTNOTEFT_GEN_A_CORE
Pt refusing labs today due to hiccupping. Pt made aware of risk that may be bleeding or have abnormal labs but still refused labs.

## 2018-04-05 LAB
BACTERIA BLD CULT: SIGNIFICANT CHANGE UP
BACTERIA BLD CULT: SIGNIFICANT CHANGE UP
BUN SERPL-MCNC: 64 MG/DL — HIGH (ref 7–23)
CALCIUM SERPL-MCNC: 8.4 MG/DL — SIGNIFICANT CHANGE UP (ref 8.4–10.5)
CHLORIDE SERPL-SCNC: 104 MMOL/L — SIGNIFICANT CHANGE UP (ref 98–107)
CO2 SERPL-SCNC: 20 MMOL/L — LOW (ref 22–31)
CREAT SERPL-MCNC: 4.49 MG/DL — HIGH (ref 0.5–1.3)
GLUCOSE BLDC GLUCOMTR-MCNC: 107 MG/DL — HIGH (ref 70–99)
GLUCOSE BLDC GLUCOMTR-MCNC: 146 MG/DL — HIGH (ref 70–99)
GLUCOSE BLDC GLUCOMTR-MCNC: 160 MG/DL — HIGH (ref 70–99)
GLUCOSE BLDC GLUCOMTR-MCNC: 161 MG/DL — HIGH (ref 70–99)
GLUCOSE SERPL-MCNC: 128 MG/DL — HIGH (ref 70–99)
HCT VFR BLD CALC: 26.6 % — LOW (ref 39–50)
HGB BLD-MCNC: 8.4 G/DL — LOW (ref 13–17)
MAGNESIUM SERPL-MCNC: 2.2 MG/DL — SIGNIFICANT CHANGE UP (ref 1.6–2.6)
MCHC RBC-ENTMCNC: 27.7 PG — SIGNIFICANT CHANGE UP (ref 27–34)
MCHC RBC-ENTMCNC: 31.6 % — LOW (ref 32–36)
MCV RBC AUTO: 87.8 FL — SIGNIFICANT CHANGE UP (ref 80–100)
NRBC # FLD: 0 — SIGNIFICANT CHANGE UP
PHOSPHATE SERPL-MCNC: 4.3 MG/DL — SIGNIFICANT CHANGE UP (ref 2.5–4.5)
PLATELET # BLD AUTO: 284 K/UL — SIGNIFICANT CHANGE UP (ref 150–400)
PMV BLD: 11.8 FL — SIGNIFICANT CHANGE UP (ref 7–13)
POTASSIUM SERPL-MCNC: 4.2 MMOL/L — SIGNIFICANT CHANGE UP (ref 3.5–5.3)
POTASSIUM SERPL-SCNC: 4.2 MMOL/L — SIGNIFICANT CHANGE UP (ref 3.5–5.3)
RBC # BLD: 3.03 M/UL — LOW (ref 4.2–5.8)
RBC # FLD: 15 % — HIGH (ref 10.3–14.5)
SODIUM SERPL-SCNC: 140 MMOL/L — SIGNIFICANT CHANGE UP (ref 135–145)
WBC # BLD: 8.15 K/UL — SIGNIFICANT CHANGE UP (ref 3.8–10.5)
WBC # FLD AUTO: 8.15 K/UL — SIGNIFICANT CHANGE UP (ref 3.8–10.5)

## 2018-04-05 PROCEDURE — 99233 SBSQ HOSP IP/OBS HIGH 50: CPT

## 2018-04-05 RX ORDER — FUROSEMIDE 40 MG
3 TABLET ORAL
Qty: 0 | Refills: 0 | DISCHARGE
Start: 2018-04-05

## 2018-04-05 RX ORDER — OXYCODONE AND ACETAMINOPHEN 5; 325 MG/1; MG/1
1 TABLET ORAL
Qty: 0 | Refills: 0 | DISCHARGE
Start: 2018-04-05

## 2018-04-05 RX ORDER — SODIUM HYPOCHLORITE 0.125 %
1 SOLUTION, NON-ORAL MISCELLANEOUS
Qty: 0 | Refills: 0 | DISCHARGE
Start: 2018-04-05

## 2018-04-05 RX ORDER — POLYETHYLENE GLYCOL 3350 17 G/17G
17 POWDER, FOR SOLUTION ORAL
Qty: 0 | Refills: 0 | DISCHARGE
Start: 2018-04-05

## 2018-04-05 RX ORDER — FUROSEMIDE 40 MG
2 TABLET ORAL
Qty: 0 | Refills: 0 | DISCHARGE
Start: 2018-04-05

## 2018-04-05 RX ORDER — TAMSULOSIN HYDROCHLORIDE 0.4 MG/1
1 CAPSULE ORAL
Qty: 0 | Refills: 0 | DISCHARGE
Start: 2018-04-05

## 2018-04-05 RX ORDER — OXYCODONE AND ACETAMINOPHEN 5; 325 MG/1; MG/1
1 TABLET ORAL EVERY 4 HOURS
Refills: 0 | Status: DISCONTINUED | OUTPATIENT
Start: 2018-04-05 | End: 2018-04-11

## 2018-04-05 RX ORDER — OXYCODONE AND ACETAMINOPHEN 5; 325 MG/1; MG/1
2 TABLET ORAL EVERY 8 HOURS
Refills: 0 | Status: DISCONTINUED | OUTPATIENT
Start: 2018-04-05 | End: 2018-04-06

## 2018-04-05 RX ORDER — OXYCODONE AND ACETAMINOPHEN 5; 325 MG/1; MG/1
2 TABLET ORAL
Qty: 0 | Refills: 0 | DISCHARGE
Start: 2018-04-05

## 2018-04-05 RX ORDER — COLLAGENASE CLOSTRIDIUM HIST. 250 UNIT/G
1 OINTMENT (GRAM) TOPICAL
Qty: 0 | Refills: 0 | DISCHARGE
Start: 2018-04-05

## 2018-04-05 RX ORDER — ACETAMINOPHEN 500 MG
2 TABLET ORAL
Qty: 0 | Refills: 0 | DISCHARGE
Start: 2018-04-05

## 2018-04-05 RX ORDER — PENTOXIFYLLINE 400 MG
1 TABLET, EXTENDED RELEASE ORAL
Qty: 0 | Refills: 0 | DISCHARGE
Start: 2018-04-05

## 2018-04-05 RX ORDER — ASPIRIN/CALCIUM CARB/MAGNESIUM 324 MG
1 TABLET ORAL
Qty: 0 | Refills: 0 | DISCHARGE
Start: 2018-04-05

## 2018-04-05 RX ADMIN — Medication 2: at 13:12

## 2018-04-05 RX ADMIN — OXYCODONE AND ACETAMINOPHEN 2 TABLET(S): 5; 325 TABLET ORAL at 21:03

## 2018-04-05 RX ADMIN — SODIUM CHLORIDE 3 MILLILITER(S): 9 INJECTION INTRAMUSCULAR; INTRAVENOUS; SUBCUTANEOUS at 13:13

## 2018-04-05 RX ADMIN — Medication 400 MILLIGRAM(S): at 05:39

## 2018-04-05 RX ADMIN — Medication 200 MILLIGRAM(S): at 05:39

## 2018-04-05 RX ADMIN — ATORVASTATIN CALCIUM 80 MILLIGRAM(S): 80 TABLET, FILM COATED ORAL at 21:03

## 2018-04-05 RX ADMIN — Medication 81 MILLIGRAM(S): at 12:41

## 2018-04-05 RX ADMIN — OXYCODONE AND ACETAMINOPHEN 2 TABLET(S): 5; 325 TABLET ORAL at 07:20

## 2018-04-05 RX ADMIN — TAMSULOSIN HYDROCHLORIDE 0.4 MILLIGRAM(S): 0.4 CAPSULE ORAL at 21:03

## 2018-04-05 RX ADMIN — Medication 200 MILLIGRAM(S): at 21:03

## 2018-04-05 RX ADMIN — HEPARIN SODIUM 5000 UNIT(S): 5000 INJECTION INTRAVENOUS; SUBCUTANEOUS at 21:03

## 2018-04-05 RX ADMIN — OXYCODONE AND ACETAMINOPHEN 2 TABLET(S): 5; 325 TABLET ORAL at 06:42

## 2018-04-05 RX ADMIN — FAMOTIDINE 20 MILLIGRAM(S): 10 INJECTION INTRAVENOUS at 12:40

## 2018-04-05 RX ADMIN — Medication 1 APPLICATION(S): at 12:40

## 2018-04-05 RX ADMIN — Medication 1 TABLET(S): at 12:40

## 2018-04-05 RX ADMIN — PIPERACILLIN AND TAZOBACTAM 25 GRAM(S): 4; .5 INJECTION, POWDER, LYOPHILIZED, FOR SOLUTION INTRAVENOUS at 17:55

## 2018-04-05 RX ADMIN — SODIUM CHLORIDE 3 MILLILITER(S): 9 INJECTION INTRAMUSCULAR; INTRAVENOUS; SUBCUTANEOUS at 05:35

## 2018-04-05 RX ADMIN — PREGABALIN 1000 MICROGRAM(S): 225 CAPSULE ORAL at 12:40

## 2018-04-05 RX ADMIN — Medication 60 MILLIGRAM(S): at 05:39

## 2018-04-05 RX ADMIN — HEPARIN SODIUM 5000 UNIT(S): 5000 INJECTION INTRAVENOUS; SUBCUTANEOUS at 13:12

## 2018-04-05 RX ADMIN — OXYCODONE AND ACETAMINOPHEN 2 TABLET(S): 5; 325 TABLET ORAL at 22:00

## 2018-04-05 RX ADMIN — PIPERACILLIN AND TAZOBACTAM 25 GRAM(S): 4; .5 INJECTION, POWDER, LYOPHILIZED, FOR SOLUTION INTRAVENOUS at 05:39

## 2018-04-05 RX ADMIN — Medication 90 MILLIGRAM(S): at 05:39

## 2018-04-05 RX ADMIN — HEPARIN SODIUM 5000 UNIT(S): 5000 INJECTION INTRAVENOUS; SUBCUTANEOUS at 05:39

## 2018-04-05 RX ADMIN — POLYETHYLENE GLYCOL 3350 17 GRAM(S): 17 POWDER, FOR SOLUTION ORAL at 12:40

## 2018-04-05 RX ADMIN — Medication 400 MILLIGRAM(S): at 13:12

## 2018-04-05 RX ADMIN — Medication 400 MILLIGRAM(S): at 21:03

## 2018-04-05 RX ADMIN — SODIUM CHLORIDE 3 MILLILITER(S): 9 INJECTION INTRAMUSCULAR; INTRAVENOUS; SUBCUTANEOUS at 21:02

## 2018-04-05 RX ADMIN — Medication 200 MILLIGRAM(S): at 13:12

## 2018-04-05 NOTE — PROGRESS NOTE ADULT - ASSESSMENT
63 y.o. Male w/ hx DM, HTN, CKD stage 4, PVD s/p Rt BKA in 2009 p/w sepsis 2/2 cellulitis and LLE SFA disease was to have Lt fem-pop bypass but delayed due to acute hypoxic respiratory failure 2/2 fluid overload from acute diastolic CHF. Acute diastolic HF now resolved after bumex gtt now s/p  left femoral to below knee popliteal artery bypass with left RSVG, jump graft from distal anastomosis to left PT using remaining RSVG on 3/29    *Peripheral arterial disease  - s/p angiogram with multi vessel disease s/p LE dopplers & vein mapping  KENN .71 on left.   - s/p Femoral-popliteal bypass 3/29  - management as per vascular  - given that LLE pain is constant and significant, consider giving oxycodone ER 10 mg q12h, with additional Percocet 1 tab q6h PRN breakthrough pain  - c/w ASA and statin    *Anemia: possible acute blood loss anemia in setting of recent surgery. No melena or visible blood loss. Transfuse 2 U prbc's over past 2 days with appropriate rise in hgb  - per discussion with vascular team, stool was hemoccult negative  - monitor hgb  - would discuss with renal regarding epo    *Acute respiratory failure with hypoxia  - s/p bumex gtt x3 days   - c/w lasix 60mg  PO daily  - cardiology consulted    *ATN (acute tubular necrosis)  - renal consulted  - Creatinine stable CKD IV in the setting of ATN from sepsis & contrast induced nephropathy   - renal US negative for hydronephrosis  - avoid nephrotoxins    *Wound infection  - Left foot with cellulitis with resulting sepsis now resolved.  - S/p vancomycin and zosyn. Completed Unasyn on 2/22    *Hypertension, unspecified type  - BP acceptable  - c/w Nifedipine and labetalol    *Type 2 diabetes mellitus with complication, without long-term current use of insulin  - Pt on oral medications at home. Well controlled  - c/w ISS while inpatient, will need to adjust oral meds upon dc given new baseline renal function    *BPH: cont tamsulosin

## 2018-04-05 NOTE — PROGRESS NOTE ADULT - SUBJECTIVE AND OBJECTIVE BOX
GENERAL SURGERY DAILY PROGRESS NOTE:       Subjective:  NAOE. Pain controlled. UOP adequate. ECTOR planning.         Objective:    PE:  Gen: alert, NAD  Pulm: airway patent, non-labored respirations  Ext: L ext dressing c/d/i, s/p R BKA, L palpable PT pulse, L DP signal incision stable  forefoot plantar aspect and  toes dorsum remain mummified      Vital Signs Last 24 Hrs  T(C): 36.8 (2018 13:10), Max: 36.9 (2018 20:50)  T(F): 98.2 (2018 13:10), Max: 98.4 (2018 20:50)  HR: 78 (2018 13:10) (62 - 78)  BP: 137/62 (2018 13:10) (134/62 - 151/67)  BP(mean): --  RR: 16 (2018 13:10) (16 - 18)  SpO2: 98% (2018 13:10) (94% - 98%)    I&O's Detail    2018 07:01  -  2018 07:00  --------------------------------------------------------  IN:  Total IN: 0 mL    OUT:    Indwelling Catheter - Urethral: 1200 mL    Voided: 200 mL  Total OUT: 1400 mL    Total NET: -1400 mL      2018 07:01  -  2018 13:17  --------------------------------------------------------  IN:  Total IN: 0 mL    OUT:    Indwelling Catheter - Urethral: 250 mL    Voided: 200 mL  Total OUT: 450 mL    Total NET: -450 mL          Daily     Daily Weight in k (2018 02:14)    MEDICATIONS  (STANDING):  aspirin enteric coated 81 milliGRAM(s) Oral daily  atorvastatin 80 milliGRAM(s) Oral at bedtime  collagenase Ointment 1 Application(s) Topical daily  cyanocobalamin 1000 MICROGram(s) Oral daily  Dakins Solution - 1/2 Strength 1 Application(s) Topical daily  dextrose 50% Injectable 25 Gram(s) IV Push once  dextrose 50% Injectable 25 Gram(s) IV Push once  famotidine    Tablet 20 milliGRAM(s) Oral daily  furosemide    Tablet 60 milliGRAM(s) Oral daily  heparin  Injectable 5000 Unit(s) SubCutaneous every 8 hours  insulin lispro (HumaLOG) corrective regimen sliding scale   SubCutaneous three times a day before meals  labetalol 200 milliGRAM(s) Oral three times a day  multivitamin 1 Tablet(s) Oral daily  NIFEdipine XL 90 milliGRAM(s) Oral daily  pentoxifylline 400 milliGRAM(s) Oral three times a day  piperacillin/tazobactam IVPB. 3.375 Gram(s) IV Intermittent every 12 hours  polyethylene glycol 3350 17 Gram(s) Oral daily  sodium chloride 0.9% lock flush 3 milliLiter(s) IV Push every 8 hours  tamsulosin 0.4 milliGRAM(s) Oral at bedtime    MEDICATIONS  (PRN):  acetaminophen   Tablet. 650 milliGRAM(s) Oral every 6 hours PRN Moderate Pain (4 - 6)  benzocaine 15 mG/menthol 3.6 mG Lozenge 1 Lozenge Oral every 2 hours PRN Sore Throat  dextrose Gel 1 Dose(s) Oral once PRN Blood Glucose LESS THAN 70 milliGRAM(s)/deciliter  glucagon  Injectable 1 milliGRAM(s) IntraMuscular once PRN Glucose LESS THAN 70 milligrams/deciliter  oxyCODONE    5 mG/acetaminophen 325 mG 1 Tablet(s) Oral every 4 hours PRN Moderate Pain  oxyCODONE    5 mG/acetaminophen 325 mG 2 Tablet(s) Oral every 8 hours PRN Severe Pain (7 - 10)      LABS:

## 2018-04-05 NOTE — PROGRESS NOTE ADULT - ASSESSMENT
63y Male s/p left femoral to below knee popliteal artery bypass with left RSVG, jump graft from distal anastomosis to left PT using remaining RSV, doing well on floor    - d/c cervantes today, check PVR if pt retains, may need to reinsert cervantes and pt to be dc'd with cervantes with urology follow up  - reg diet  - Strict I/O's  - DVT ppx  - IV antibiotics  - discharge planning  - dispo: ECTOR

## 2018-04-05 NOTE — PROVIDER CONTACT NOTE (OTHER) - ASSESSMENT
Pt. is a&oX4, VSS; pt. stuck once for AM labs but unsuccessful. Pt. does not want to be stuck until after breakfast./

## 2018-04-05 NOTE — PROGRESS NOTE ADULT - SUBJECTIVE AND OBJECTIVE BOX
CHIEF COMPLAINT: Patient is a 63y old  male who presents with a chief complaint of Left foot pain x 3 days. (19 Feb 2018 10:39)      SUBJECTIVE / OVERNIGHT EVENTS:    Hiccups resolved. Continues to complain of constant, "day and night" pain in the LLE. Pain is constant. Alleviated by medication. Worse with movement of the LE's. Reports that due to his poor vision, he is not always aware of what pain medications are being given to him and when.    MEDICATIONS  (STANDING):  aspirin enteric coated 81 milliGRAM(s) Oral daily  atorvastatin 80 milliGRAM(s) Oral at bedtime  collagenase Ointment 1 Application(s) Topical daily  cyanocobalamin 1000 MICROGram(s) Oral daily  Dakins Solution - 1/2 Strength 1 Application(s) Topical daily  dextrose 50% Injectable 25 Gram(s) IV Push once  dextrose 50% Injectable 25 Gram(s) IV Push once  famotidine    Tablet 20 milliGRAM(s) Oral daily  furosemide    Tablet 60 milliGRAM(s) Oral daily  heparin  Injectable 5000 Unit(s) SubCutaneous every 8 hours  insulin lispro (HumaLOG) corrective regimen sliding scale   SubCutaneous three times a day before meals  labetalol 200 milliGRAM(s) Oral three times a day  multivitamin 1 Tablet(s) Oral daily  NIFEdipine XL 90 milliGRAM(s) Oral daily  pentoxifylline 400 milliGRAM(s) Oral three times a day  piperacillin/tazobactam IVPB. 3.375 Gram(s) IV Intermittent every 12 hours  polyethylene glycol 3350 17 Gram(s) Oral daily  sodium chloride 0.9% lock flush 3 milliLiter(s) IV Push every 8 hours  tamsulosin 0.4 milliGRAM(s) Oral at bedtime    MEDICATIONS  (PRN):  acetaminophen   Tablet. 650 milliGRAM(s) Oral every 6 hours PRN Moderate Pain (4 - 6)  benzocaine 15 mG/menthol 3.6 mG Lozenge 1 Lozenge Oral every 2 hours PRN Sore Throat  dextrose Gel 1 Dose(s) Oral once PRN Blood Glucose LESS THAN 70 milliGRAM(s)/deciliter  glucagon  Injectable 1 milliGRAM(s) IntraMuscular once PRN Glucose LESS THAN 70 milligrams/deciliter  oxyCODONE    5 mG/acetaminophen 325 mG 1 Tablet(s) Oral every 4 hours PRN Moderate Pain  oxyCODONE    5 mG/acetaminophen 325 mG 2 Tablet(s) Oral every 8 hours PRN Severe Pain (7 - 10)      VITALS:  T(F): 98.2 (04-05-18 @ 13:10), Max: 98.4 (04-04-18 @ 20:50)  HR: 78 (04-05-18 @ 13:10) (62 - 78)  BP: 137/62 (04-05-18 @ 13:10) (134/62 - 149/67)  RR: 16 (04-05-18 @ 13:10) (16 - 18)  SpO2: 98% (04-05-18 @ 13:10)      CAPILLARY BLOOD GLUCOSE    Output     I&O's Summary  T(F): 98.2 (04-05-18 @ 13:10), Max: 98.4 (04-04-18 @ 20:50)  HR: 78 (04-05-18 @ 13:10) (62 - 78)  BP: 137/62 (04-05-18 @ 13:10) (134/62 - 149/67)  RR: 16 (04-05-18 @ 13:10) (16 - 18)  SpO2: 98% (04-05-18 @ 13:10)    PHYSICAL EXAM:  GENERAL: middle-aged man lying in bed in NAD  HEAD:  Atraumatic, Normocephalic  EYES: EOMI  NECK: Supple, No JVD  CHEST/LUNG: nonlabored breathing  HEART: nl S1/S2  ABDOMEN: nondistended, soft  PSYCH: alert, answering questions appropriately  NEUROLOGY: non-focal  SKIN: No rashes noted    LABS:              8.4                  x    | x    | x            8.15  >-----------< 284     ------------------------< x                     26.6                 x    | x    | x                                            Ca x     Mg x     Ph x                          MICROBIOLOGY:        RADIOLOGY & ADDITIONAL TESTS:    Imaging Personally Reviewed:        [ x ] Consultant(s) Notes Reviewed: vascular, podiatry    [ ] Care Discussed with Consultants/Other Providers:

## 2018-04-06 LAB
BLD GP AB SCN SERPL QL: NEGATIVE — SIGNIFICANT CHANGE UP
BUN SERPL-MCNC: 63 MG/DL — HIGH (ref 7–23)
CALCIUM SERPL-MCNC: 7.9 MG/DL — LOW (ref 8.4–10.5)
CHLORIDE SERPL-SCNC: 102 MMOL/L — SIGNIFICANT CHANGE UP (ref 98–107)
CO2 SERPL-SCNC: 20 MMOL/L — LOW (ref 22–31)
CREAT SERPL-MCNC: 4.61 MG/DL — HIGH (ref 0.5–1.3)
GLUCOSE BLDC GLUCOMTR-MCNC: 122 MG/DL — HIGH (ref 70–99)
GLUCOSE BLDC GLUCOMTR-MCNC: 122 MG/DL — HIGH (ref 70–99)
GLUCOSE BLDC GLUCOMTR-MCNC: 129 MG/DL — HIGH (ref 70–99)
GLUCOSE BLDC GLUCOMTR-MCNC: 201 MG/DL — HIGH (ref 70–99)
GLUCOSE SERPL-MCNC: 127 MG/DL — HIGH (ref 70–99)
HAPTOGLOB SERPL-MCNC: 380 MG/DL — HIGH (ref 34–200)
HCT VFR BLD CALC: 19.9 % — CRITICAL LOW (ref 39–50)
HCT VFR BLD CALC: 22.9 % — LOW (ref 39–50)
HGB BLD-MCNC: 6.5 G/DL — CRITICAL LOW (ref 13–17)
HGB BLD-MCNC: 7.3 G/DL — LOW (ref 13–17)
LDH SERPL L TO P-CCNC: 341 U/L — HIGH (ref 135–225)
MAGNESIUM SERPL-MCNC: 2.2 MG/DL — SIGNIFICANT CHANGE UP (ref 1.6–2.6)
MCHC RBC-ENTMCNC: 27.5 PG — SIGNIFICANT CHANGE UP (ref 27–34)
MCHC RBC-ENTMCNC: 28.1 PG — SIGNIFICANT CHANGE UP (ref 27–34)
MCHC RBC-ENTMCNC: 31.9 % — LOW (ref 32–36)
MCHC RBC-ENTMCNC: 32.7 % — SIGNIFICANT CHANGE UP (ref 32–36)
MCV RBC AUTO: 86.1 FL — SIGNIFICANT CHANGE UP (ref 80–100)
MCV RBC AUTO: 86.4 FL — SIGNIFICANT CHANGE UP (ref 80–100)
NRBC # FLD: 0 — SIGNIFICANT CHANGE UP
NRBC # FLD: 0 — SIGNIFICANT CHANGE UP
PHOSPHATE SERPL-MCNC: 4.7 MG/DL — HIGH (ref 2.5–4.5)
PLATELET # BLD AUTO: 253 K/UL — SIGNIFICANT CHANGE UP (ref 150–400)
PLATELET # BLD AUTO: 283 K/UL — SIGNIFICANT CHANGE UP (ref 150–400)
PMV BLD: 11.6 FL — SIGNIFICANT CHANGE UP (ref 7–13)
PMV BLD: 11.8 FL — SIGNIFICANT CHANGE UP (ref 7–13)
POTASSIUM SERPL-MCNC: 4.2 MMOL/L — SIGNIFICANT CHANGE UP (ref 3.5–5.3)
POTASSIUM SERPL-SCNC: 4.2 MMOL/L — SIGNIFICANT CHANGE UP (ref 3.5–5.3)
RBC # BLD: 2.31 M/UL — LOW (ref 4.2–5.8)
RBC # BLD: 2.65 M/UL — LOW (ref 4.2–5.8)
RBC # FLD: 15.1 % — HIGH (ref 10.3–14.5)
RBC # FLD: 15.2 % — HIGH (ref 10.3–14.5)
RH IG SCN BLD-IMP: POSITIVE — SIGNIFICANT CHANGE UP
SODIUM SERPL-SCNC: 139 MMOL/L — SIGNIFICANT CHANGE UP (ref 135–145)
WBC # BLD: 8.91 K/UL — SIGNIFICANT CHANGE UP (ref 3.8–10.5)
WBC # BLD: 9.48 K/UL — SIGNIFICANT CHANGE UP (ref 3.8–10.5)
WBC # FLD AUTO: 8.91 K/UL — SIGNIFICANT CHANGE UP (ref 3.8–10.5)
WBC # FLD AUTO: 9.48 K/UL — SIGNIFICANT CHANGE UP (ref 3.8–10.5)

## 2018-04-06 PROCEDURE — 99233 SBSQ HOSP IP/OBS HIGH 50: CPT

## 2018-04-06 RX ORDER — OXYCODONE HYDROCHLORIDE 5 MG/1
10 TABLET ORAL EVERY 12 HOURS
Refills: 0 | Status: COMPLETED | OUTPATIENT
Start: 2018-04-06 | End: 2018-04-13

## 2018-04-06 RX ORDER — GABAPENTIN 400 MG/1
200 CAPSULE ORAL DAILY
Refills: 0 | Status: DISCONTINUED | OUTPATIENT
Start: 2018-04-06 | End: 2018-04-08

## 2018-04-06 RX ADMIN — OXYCODONE HYDROCHLORIDE 10 MILLIGRAM(S): 5 TABLET ORAL at 18:45

## 2018-04-06 RX ADMIN — FAMOTIDINE 20 MILLIGRAM(S): 10 INJECTION INTRAVENOUS at 13:30

## 2018-04-06 RX ADMIN — Medication 400 MILLIGRAM(S): at 13:30

## 2018-04-06 RX ADMIN — Medication 90 MILLIGRAM(S): at 05:06

## 2018-04-06 RX ADMIN — Medication 1 APPLICATION(S): at 13:31

## 2018-04-06 RX ADMIN — Medication 60 MILLIGRAM(S): at 05:07

## 2018-04-06 RX ADMIN — HEPARIN SODIUM 5000 UNIT(S): 5000 INJECTION INTRAVENOUS; SUBCUTANEOUS at 13:30

## 2018-04-06 RX ADMIN — Medication 1 TABLET(S): at 13:30

## 2018-04-06 RX ADMIN — Medication 4: at 13:08

## 2018-04-06 RX ADMIN — SODIUM CHLORIDE 3 MILLILITER(S): 9 INJECTION INTRAMUSCULAR; INTRAVENOUS; SUBCUTANEOUS at 22:12

## 2018-04-06 RX ADMIN — Medication 200 MILLIGRAM(S): at 05:07

## 2018-04-06 RX ADMIN — ATORVASTATIN CALCIUM 80 MILLIGRAM(S): 80 TABLET, FILM COATED ORAL at 22:10

## 2018-04-06 RX ADMIN — SODIUM CHLORIDE 3 MILLILITER(S): 9 INJECTION INTRAMUSCULAR; INTRAVENOUS; SUBCUTANEOUS at 05:06

## 2018-04-06 RX ADMIN — OXYCODONE AND ACETAMINOPHEN 2 TABLET(S): 5; 325 TABLET ORAL at 14:00

## 2018-04-06 RX ADMIN — Medication 200 MILLIGRAM(S): at 13:30

## 2018-04-06 RX ADMIN — Medication 400 MILLIGRAM(S): at 22:10

## 2018-04-06 RX ADMIN — Medication 400 MILLIGRAM(S): at 05:07

## 2018-04-06 RX ADMIN — HEPARIN SODIUM 5000 UNIT(S): 5000 INJECTION INTRAVENOUS; SUBCUTANEOUS at 22:10

## 2018-04-06 RX ADMIN — SODIUM CHLORIDE 3 MILLILITER(S): 9 INJECTION INTRAMUSCULAR; INTRAVENOUS; SUBCUTANEOUS at 13:32

## 2018-04-06 RX ADMIN — TAMSULOSIN HYDROCHLORIDE 0.4 MILLIGRAM(S): 0.4 CAPSULE ORAL at 22:10

## 2018-04-06 RX ADMIN — Medication 200 MILLIGRAM(S): at 22:10

## 2018-04-06 RX ADMIN — HEPARIN SODIUM 5000 UNIT(S): 5000 INJECTION INTRAVENOUS; SUBCUTANEOUS at 05:07

## 2018-04-06 RX ADMIN — OXYCODONE AND ACETAMINOPHEN 2 TABLET(S): 5; 325 TABLET ORAL at 05:07

## 2018-04-06 RX ADMIN — PIPERACILLIN AND TAZOBACTAM 25 GRAM(S): 4; .5 INJECTION, POWDER, LYOPHILIZED, FOR SOLUTION INTRAVENOUS at 05:07

## 2018-04-06 RX ADMIN — PREGABALIN 1000 MICROGRAM(S): 225 CAPSULE ORAL at 13:30

## 2018-04-06 RX ADMIN — GABAPENTIN 200 MILLIGRAM(S): 400 CAPSULE ORAL at 22:10

## 2018-04-06 RX ADMIN — OXYCODONE HYDROCHLORIDE 10 MILLIGRAM(S): 5 TABLET ORAL at 19:00

## 2018-04-06 RX ADMIN — OXYCODONE AND ACETAMINOPHEN 2 TABLET(S): 5; 325 TABLET ORAL at 05:38

## 2018-04-06 RX ADMIN — Medication 81 MILLIGRAM(S): at 13:30

## 2018-04-06 RX ADMIN — POLYETHYLENE GLYCOL 3350 17 GRAM(S): 17 POWDER, FOR SOLUTION ORAL at 13:32

## 2018-04-06 RX ADMIN — PIPERACILLIN AND TAZOBACTAM 25 GRAM(S): 4; .5 INJECTION, POWDER, LYOPHILIZED, FOR SOLUTION INTRAVENOUS at 20:30

## 2018-04-06 RX ADMIN — OXYCODONE AND ACETAMINOPHEN 2 TABLET(S): 5; 325 TABLET ORAL at 13:30

## 2018-04-06 NOTE — PROVIDER CONTACT NOTE (CRITICAL VALUE NOTIFICATION) - PERSON GIVING RESULT:
Salo Aguilera
Alka, K
Hematology; Reza Alanis
Ashley Marin
Leeanna Dennis, lab
Leeanna Baugh/Hematology

## 2018-04-06 NOTE — PROGRESS NOTE ADULT - SUBJECTIVE AND OBJECTIVE BOX
GENERAL SURGERY DAILY PROGRESS NOTE:     SUBJECTIVE: Patient seen and examined at bedside, on morning rounds, patient without complaints. Reports that he is urinating well, pain is well controlled.     Vital Signs Last 24 Hrs  T(C): 36.7 (06 Apr 2018 14:28), Max: 36.9 (05 Apr 2018 18:22)  T(F): 98.1 (06 Apr 2018 14:28), Max: 98.4 (05 Apr 2018 18:22)  HR: 68 (06 Apr 2018 14:28) (64 - 78)  BP: 142/65 (06 Apr 2018 14:28) (128/79 - 146/66)  BP(mean): --  RR: 16 (06 Apr 2018 14:28) (16 - 19)  SpO2: 100% (06 Apr 2018 14:28) (95% - 100%)  I&O's Detail    05 Apr 2018 07:01  -  06 Apr 2018 07:00  --------------------------------------------------------  IN:    IV PiggyBack: 200 mL  Total IN: 200 mL    OUT:    Indwelling Catheter - Urethral: 250 mL    Voided: 1200 mL  Total OUT: 1450 mL    Total NET: -1250 mL      06 Apr 2018 07:01  -  06 Apr 2018 15:23  --------------------------------------------------------  IN:  Total IN: 0 mL    OUT:    Voided: 250 mL  Total OUT: 250 mL    Total NET: -250 mL        MEDICATIONS  (STANDING):  aspirin enteric coated 81 milliGRAM(s) Oral daily  atorvastatin 80 milliGRAM(s) Oral at bedtime  collagenase Ointment 1 Application(s) Topical daily  cyanocobalamin 1000 MICROGram(s) Oral daily  Dakins Solution - 1/2 Strength 1 Application(s) Topical daily  dextrose 50% Injectable 25 Gram(s) IV Push once  dextrose 50% Injectable 25 Gram(s) IV Push once  famotidine    Tablet 20 milliGRAM(s) Oral daily  furosemide    Tablet 60 milliGRAM(s) Oral daily  heparin  Injectable 5000 Unit(s) SubCutaneous every 8 hours  insulin lispro (HumaLOG) corrective regimen sliding scale   SubCutaneous three times a day before meals  labetalol 200 milliGRAM(s) Oral three times a day  multivitamin 1 Tablet(s) Oral daily  NIFEdipine XL 90 milliGRAM(s) Oral daily  pentoxifylline 400 milliGRAM(s) Oral three times a day  piperacillin/tazobactam IVPB. 3.375 Gram(s) IV Intermittent every 12 hours  polyethylene glycol 3350 17 Gram(s) Oral daily  sodium chloride 0.9% lock flush 3 milliLiter(s) IV Push every 8 hours  tamsulosin 0.4 milliGRAM(s) Oral at bedtime    MEDICATIONS  (PRN):  acetaminophen   Tablet. 650 milliGRAM(s) Oral every 6 hours PRN Moderate Pain (4 - 6)  benzocaine 15 mG/menthol 3.6 mG Lozenge 1 Lozenge Oral every 2 hours PRN Sore Throat  dextrose Gel 1 Dose(s) Oral once PRN Blood Glucose LESS THAN 70 milliGRAM(s)/deciliter  glucagon  Injectable 1 milliGRAM(s) IntraMuscular once PRN Glucose LESS THAN 70 milligrams/deciliter  oxyCODONE    5 mG/acetaminophen 325 mG 1 Tablet(s) Oral every 4 hours PRN Moderate Pain  oxyCODONE    5 mG/acetaminophen 325 mG 2 Tablet(s) Oral every 8 hours PRN Severe Pain (7 - 10)      Physical Exam  Gen: Alert, NAD  Pulm: Airway patient, unlabored respirations   Ext: L ext dressing c/d/i, s/p R BKA, L palpable PT pulse, L DP signal incision stable  forefoot plantar aspect and  toes dorsum remain mummified    LABS:                        7.3    9.48  )-----------( 283      ( 06 Apr 2018 11:19 )             22.9     04-06    139  |  102  |  63<H>  ----------------------------<  127<H>  4.2   |  20<L>  |  4.61<H>    Ca    7.9<L>      06 Apr 2018 06:30  Phos  4.7     04-06  Mg     2.2     04-06

## 2018-04-06 NOTE — CHART NOTE - NSCHARTNOTEFT_GEN_A_CORE
Cr plateaud may return to baseline or may represent new baseline.   CKD stable. Will need to follow up with a nephrologist as outpt.   No indication for RRT. Cont supportive care.  Call for any further questions.   Will sign off

## 2018-04-06 NOTE — PROVIDER CONTACT NOTE (CRITICAL VALUE NOTIFICATION) - SITUATION
Hemaglobin 7.0, Hematocrit 22.4
H/H 6.5/20.4
troponin 0.07
Hemoglobin 5.9 Hematocrit 18.2
H/H=6.2/20.3
h/h 6.5/19.9.

## 2018-04-06 NOTE — PROVIDER CONTACT NOTE (CRITICAL VALUE NOTIFICATION) - BACKGROUND
pt/ s/p left fem-pop bypass.
s/p fem-pop and pop-tib bypass with RSVG
s/p left fem-pop bypass and left pop-tib bypass
patient admitted with cellulitis
pt. admitted for cellulitis.
pt. A+Ox4, admitted for cellulitis

## 2018-04-06 NOTE — PROGRESS NOTE ADULT - SUBJECTIVE AND OBJECTIVE BOX
Patient is a 63y old  Male who presents with a chief complaint of Left foot pain x 3 days. (19 Feb 2018 10:39)      SUBJECTIVE / OVERNIGHT EVENTS:    MEDICATIONS  (STANDING):  aspirin enteric coated 81 milliGRAM(s) Oral daily  atorvastatin 80 milliGRAM(s) Oral at bedtime  collagenase Ointment 1 Application(s) Topical daily  cyanocobalamin 1000 MICROGram(s) Oral daily  Dakins Solution - 1/2 Strength 1 Application(s) Topical daily  dextrose 50% Injectable 25 Gram(s) IV Push once  dextrose 50% Injectable 25 Gram(s) IV Push once  famotidine    Tablet 20 milliGRAM(s) Oral daily  furosemide    Tablet 60 milliGRAM(s) Oral daily  heparin  Injectable 5000 Unit(s) SubCutaneous every 8 hours  insulin lispro (HumaLOG) corrective regimen sliding scale   SubCutaneous three times a day before meals  labetalol 200 milliGRAM(s) Oral three times a day  multivitamin 1 Tablet(s) Oral daily  NIFEdipine XL 90 milliGRAM(s) Oral daily  pentoxifylline 400 milliGRAM(s) Oral three times a day  piperacillin/tazobactam IVPB. 3.375 Gram(s) IV Intermittent every 12 hours  polyethylene glycol 3350 17 Gram(s) Oral daily  sodium chloride 0.9% lock flush 3 milliLiter(s) IV Push every 8 hours  tamsulosin 0.4 milliGRAM(s) Oral at bedtime    MEDICATIONS  (PRN):  acetaminophen   Tablet. 650 milliGRAM(s) Oral every 6 hours PRN Moderate Pain (4 - 6)  benzocaine 15 mG/menthol 3.6 mG Lozenge 1 Lozenge Oral every 2 hours PRN Sore Throat  dextrose Gel 1 Dose(s) Oral once PRN Blood Glucose LESS THAN 70 milliGRAM(s)/deciliter  glucagon  Injectable 1 milliGRAM(s) IntraMuscular once PRN Glucose LESS THAN 70 milligrams/deciliter  oxyCODONE    5 mG/acetaminophen 325 mG 1 Tablet(s) Oral every 4 hours PRN Moderate Pain  oxyCODONE    5 mG/acetaminophen 325 mG 2 Tablet(s) Oral every 8 hours PRN Severe Pain (7 - 10)        CAPILLARY BLOOD GLUCOSE      POCT Blood Glucose.: 201 mg/dL (06 Apr 2018 13:00)  POCT Blood Glucose.: 122 mg/dL (06 Apr 2018 09:09)  POCT Blood Glucose.: 161 mg/dL (05 Apr 2018 22:15)  POCT Blood Glucose.: 146 mg/dL (05 Apr 2018 18:04)    I&O's Summary    05 Apr 2018 07:01  -  06 Apr 2018 07:00  --------------------------------------------------------  IN: 200 mL / OUT: 1450 mL / NET: -1250 mL    06 Apr 2018 07:01  -  06 Apr 2018 14:05  --------------------------------------------------------  IN: 0 mL / OUT: 100 mL / NET: -100 mL        T(C): 36.7 (04-06-18 @ 10:04), Max: 36.9 (04-05-18 @ 18:22)  HR: 70 (04-06-18 @ 10:04) (64 - 78)  BP: 136/60 (04-06-18 @ 10:04) (128/79 - 146/66)  RR: 17 (04-06-18 @ 10:04) (16 - 19)  SpO2: 95% (04-06-18 @ 10:04) (95% - 98%)    PHYSICAL EXAM:    LABS:                        7.3    9.48  )-----------( 283      ( 06 Apr 2018 11:19 )             22.9     04-06    139  |  102  |  63<H>  ----------------------------<  127<H>  4.2   |  20<L>  |  4.61<H>    Ca    7.9<L>      06 Apr 2018 06:30  Phos  4.7     04-06  Mg     2.2     04-06                  RADIOLOGY & ADDITIONAL TESTS:    Imaging Personally Reviewed:    Consultant(s) Notes Reviewed:      Care Discussed with Consultants/Other Providers: Patient is a 63y old  Male who presents with a chief complaint of Left foot pain x 3 days. (19 Feb 2018 10:39)      SUBJECTIVE / OVERNIGHT EVENTS: Pt complains about feeling weak. +pain in LT LE ext     MEDICATIONS  (STANDING):  aspirin enteric coated 81 milliGRAM(s) Oral daily  atorvastatin 80 milliGRAM(s) Oral at bedtime  collagenase Ointment 1 Application(s) Topical daily  cyanocobalamin 1000 MICROGram(s) Oral daily  Dakins Solution - 1/2 Strength 1 Application(s) Topical daily  dextrose 50% Injectable 25 Gram(s) IV Push once  dextrose 50% Injectable 25 Gram(s) IV Push once  famotidine    Tablet 20 milliGRAM(s) Oral daily  furosemide    Tablet 60 milliGRAM(s) Oral daily  heparin  Injectable 5000 Unit(s) SubCutaneous every 8 hours  insulin lispro (HumaLOG) corrective regimen sliding scale   SubCutaneous three times a day before meals  labetalol 200 milliGRAM(s) Oral three times a day  multivitamin 1 Tablet(s) Oral daily  NIFEdipine XL 90 milliGRAM(s) Oral daily  pentoxifylline 400 milliGRAM(s) Oral three times a day  piperacillin/tazobactam IVPB. 3.375 Gram(s) IV Intermittent every 12 hours  polyethylene glycol 3350 17 Gram(s) Oral daily  sodium chloride 0.9% lock flush 3 milliLiter(s) IV Push every 8 hours  tamsulosin 0.4 milliGRAM(s) Oral at bedtime    MEDICATIONS  (PRN):  acetaminophen   Tablet. 650 milliGRAM(s) Oral every 6 hours PRN Moderate Pain (4 - 6)  benzocaine 15 mG/menthol 3.6 mG Lozenge 1 Lozenge Oral every 2 hours PRN Sore Throat  dextrose Gel 1 Dose(s) Oral once PRN Blood Glucose LESS THAN 70 milliGRAM(s)/deciliter  glucagon  Injectable 1 milliGRAM(s) IntraMuscular once PRN Glucose LESS THAN 70 milligrams/deciliter  oxyCODONE    5 mG/acetaminophen 325 mG 1 Tablet(s) Oral every 4 hours PRN Moderate Pain  oxyCODONE    5 mG/acetaminophen 325 mG 2 Tablet(s) Oral every 8 hours PRN Severe Pain (7 - 10)        CAPILLARY BLOOD GLUCOSE      POCT Blood Glucose.: 201 mg/dL (06 Apr 2018 13:00)  POCT Blood Glucose.: 122 mg/dL (06 Apr 2018 09:09)  POCT Blood Glucose.: 161 mg/dL (05 Apr 2018 22:15)  POCT Blood Glucose.: 146 mg/dL (05 Apr 2018 18:04)    I&O's Summary    05 Apr 2018 07:01  -  06 Apr 2018 07:00  --------------------------------------------------------  IN: 200 mL / OUT: 1450 mL / NET: -1250 mL    06 Apr 2018 07:01  -  06 Apr 2018 14:05  --------------------------------------------------------  IN: 0 mL / OUT: 100 mL / NET: -100 mL        T(C): 36.7 (04-06-18 @ 10:04), Max: 36.9 (04-05-18 @ 18:22)  HR: 70 (04-06-18 @ 10:04) (64 - 78)  BP: 136/60 (04-06-18 @ 10:04) (128/79 - 146/66)  RR: 17 (04-06-18 @ 10:04) (16 - 19)  SpO2: 95% (04-06-18 @ 10:04) (95% - 98%)    PHYSICAL EXAM:  GENERAL: middle-aged man lying in bed in NAD  HEAD:  Atraumatic, Normocephalic  EYES: EOMI  NECK: Supple, No JVD  CHEST/LUNG: nonlabored breathing  HEART: nl S1/S2  ABDOMEN: nondistended, soft  EXT: Lt LE staples RT BKA  PSYCH: alert, answering questions appropriately  NEUROLOGY: non-focal  SKIN: No rashes noted  LABS:                        7.3    9.48  )-----------( 283      ( 06 Apr 2018 11:19 )             22.9     04-06    139  |  102  |  63<H>  ----------------------------<  127<H>  4.2   |  20<L>  |  4.61<H>    Ca    7.9<L>      06 Apr 2018 06:30  Phos  4.7     04-06  Mg     2.2     04-06                  RADIOLOGY & ADDITIONAL TESTS:    Imaging Personally Reviewed:    Consultant(s) Notes Reviewed:      Care Discussed with Consultants/Other Providers:

## 2018-04-06 NOTE — PROGRESS NOTE ADULT - ASSESSMENT
63y Male s/p left femoral to below knee popliteal artery bypass with left RSVG, jump graft from distal anastomosis to left PT using remaining RSV, doing well on floor  - Reg diet  - Strict I/O's  - Patient with likely anemia of chronic disease, down-trending H/H, will give 1u PRBC  - Follow up post transfusion CBC  - F/u nephrology recommendations regarding epogen   - DVT ppx  - IV antibiotics until discharge   - Discharge planning to rehab, after authorization
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
## 2018-04-06 NOTE — PROVIDER CONTACT NOTE (CRITICAL VALUE NOTIFICATION) - ASSESSMENT
No s/s of bleeding. VSS
H/H=6.2/20.3
pt. alert, no complaints of chest pain at current time, resting in bed comfortably
Pt. A+Ox4, no complaints of pain.
Pt. is a&oX4, VSS. h/h 6.5/19.9. PmhX anemia. NO s/s bleeding.
VS stable

## 2018-04-07 LAB
BUN SERPL-MCNC: 64 MG/DL — HIGH (ref 7–23)
CALCIUM SERPL-MCNC: 8.3 MG/DL — LOW (ref 8.4–10.5)
CHLORIDE SERPL-SCNC: 104 MMOL/L — SIGNIFICANT CHANGE UP (ref 98–107)
CO2 SERPL-SCNC: 19 MMOL/L — LOW (ref 22–31)
CREAT SERPL-MCNC: 4.9 MG/DL — HIGH (ref 0.5–1.3)
GLUCOSE BLDC GLUCOMTR-MCNC: 143 MG/DL — HIGH (ref 70–99)
GLUCOSE BLDC GLUCOMTR-MCNC: 156 MG/DL — HIGH (ref 70–99)
GLUCOSE BLDC GLUCOMTR-MCNC: 174 MG/DL — HIGH (ref 70–99)
GLUCOSE BLDC GLUCOMTR-MCNC: 191 MG/DL — HIGH (ref 70–99)
GLUCOSE SERPL-MCNC: 104 MG/DL — HIGH (ref 70–99)
HCT VFR BLD CALC: 21.4 % — LOW (ref 39–50)
HCT VFR BLD CALC: 26.4 % — LOW (ref 39–50)
HGB BLD-MCNC: 7 G/DL — CRITICAL LOW (ref 13–17)
HGB BLD-MCNC: 8.5 G/DL — LOW (ref 13–17)
MAGNESIUM SERPL-MCNC: 2.3 MG/DL — SIGNIFICANT CHANGE UP (ref 1.6–2.6)
MCHC RBC-ENTMCNC: 27.6 PG — SIGNIFICANT CHANGE UP (ref 27–34)
MCHC RBC-ENTMCNC: 28.5 PG — SIGNIFICANT CHANGE UP (ref 27–34)
MCHC RBC-ENTMCNC: 32.2 % — SIGNIFICANT CHANGE UP (ref 32–36)
MCHC RBC-ENTMCNC: 32.7 % — SIGNIFICANT CHANGE UP (ref 32–36)
MCV RBC AUTO: 85.7 FL — SIGNIFICANT CHANGE UP (ref 80–100)
MCV RBC AUTO: 87 FL — SIGNIFICANT CHANGE UP (ref 80–100)
NRBC # FLD: 0 — SIGNIFICANT CHANGE UP
NRBC # FLD: 0 — SIGNIFICANT CHANGE UP
PHOSPHATE SERPL-MCNC: 5.6 MG/DL — HIGH (ref 2.5–4.5)
PLATELET # BLD AUTO: 277 K/UL — SIGNIFICANT CHANGE UP (ref 150–400)
PLATELET # BLD AUTO: 280 K/UL — SIGNIFICANT CHANGE UP (ref 150–400)
PMV BLD: 11.4 FL — SIGNIFICANT CHANGE UP (ref 7–13)
PMV BLD: 11.9 FL — SIGNIFICANT CHANGE UP (ref 7–13)
POTASSIUM SERPL-MCNC: 4.2 MMOL/L — SIGNIFICANT CHANGE UP (ref 3.5–5.3)
POTASSIUM SERPL-SCNC: 4.2 MMOL/L — SIGNIFICANT CHANGE UP (ref 3.5–5.3)
RBC # BLD: 2.46 M/UL — LOW (ref 4.2–5.8)
RBC # BLD: 3.08 M/UL — LOW (ref 4.2–5.8)
RBC # FLD: 14.9 % — HIGH (ref 10.3–14.5)
RBC # FLD: 14.9 % — HIGH (ref 10.3–14.5)
SODIUM SERPL-SCNC: 140 MMOL/L — SIGNIFICANT CHANGE UP (ref 135–145)
WBC # BLD: 8.18 K/UL — SIGNIFICANT CHANGE UP (ref 3.8–10.5)
WBC # BLD: 9.07 K/UL — SIGNIFICANT CHANGE UP (ref 3.8–10.5)
WBC # FLD AUTO: 8.18 K/UL — SIGNIFICANT CHANGE UP (ref 3.8–10.5)
WBC # FLD AUTO: 9.07 K/UL — SIGNIFICANT CHANGE UP (ref 3.8–10.5)

## 2018-04-07 PROCEDURE — 99233 SBSQ HOSP IP/OBS HIGH 50: CPT

## 2018-04-07 RX ORDER — FUROSEMIDE 40 MG
20 TABLET ORAL ONCE
Refills: 0 | Status: DISCONTINUED | OUTPATIENT
Start: 2018-04-07 | End: 2018-04-11

## 2018-04-07 RX ADMIN — Medication 400 MILLIGRAM(S): at 14:31

## 2018-04-07 RX ADMIN — HEPARIN SODIUM 5000 UNIT(S): 5000 INJECTION INTRAVENOUS; SUBCUTANEOUS at 05:48

## 2018-04-07 RX ADMIN — Medication 81 MILLIGRAM(S): at 14:32

## 2018-04-07 RX ADMIN — Medication 200 MILLIGRAM(S): at 22:14

## 2018-04-07 RX ADMIN — FAMOTIDINE 20 MILLIGRAM(S): 10 INJECTION INTRAVENOUS at 14:32

## 2018-04-07 RX ADMIN — SODIUM CHLORIDE 3 MILLILITER(S): 9 INJECTION INTRAMUSCULAR; INTRAVENOUS; SUBCUTANEOUS at 10:28

## 2018-04-07 RX ADMIN — OXYCODONE HYDROCHLORIDE 10 MILLIGRAM(S): 5 TABLET ORAL at 18:10

## 2018-04-07 RX ADMIN — Medication 90 MILLIGRAM(S): at 05:48

## 2018-04-07 RX ADMIN — Medication 400 MILLIGRAM(S): at 05:48

## 2018-04-07 RX ADMIN — Medication 60 MILLIGRAM(S): at 05:48

## 2018-04-07 RX ADMIN — Medication 1 TABLET(S): at 14:32

## 2018-04-07 RX ADMIN — GABAPENTIN 200 MILLIGRAM(S): 400 CAPSULE ORAL at 14:31

## 2018-04-07 RX ADMIN — Medication 200 MILLIGRAM(S): at 14:32

## 2018-04-07 RX ADMIN — TAMSULOSIN HYDROCHLORIDE 0.4 MILLIGRAM(S): 0.4 CAPSULE ORAL at 22:14

## 2018-04-07 RX ADMIN — SODIUM CHLORIDE 3 MILLILITER(S): 9 INJECTION INTRAMUSCULAR; INTRAVENOUS; SUBCUTANEOUS at 05:49

## 2018-04-07 RX ADMIN — SODIUM CHLORIDE 3 MILLILITER(S): 9 INJECTION INTRAMUSCULAR; INTRAVENOUS; SUBCUTANEOUS at 22:08

## 2018-04-07 RX ADMIN — PIPERACILLIN AND TAZOBACTAM 25 GRAM(S): 4; .5 INJECTION, POWDER, LYOPHILIZED, FOR SOLUTION INTRAVENOUS at 05:57

## 2018-04-07 RX ADMIN — Medication 2: at 09:57

## 2018-04-07 RX ADMIN — Medication 400 MILLIGRAM(S): at 22:14

## 2018-04-07 RX ADMIN — HEPARIN SODIUM 5000 UNIT(S): 5000 INJECTION INTRAVENOUS; SUBCUTANEOUS at 22:14

## 2018-04-07 RX ADMIN — PIPERACILLIN AND TAZOBACTAM 25 GRAM(S): 4; .5 INJECTION, POWDER, LYOPHILIZED, FOR SOLUTION INTRAVENOUS at 18:08

## 2018-04-07 RX ADMIN — OXYCODONE HYDROCHLORIDE 10 MILLIGRAM(S): 5 TABLET ORAL at 18:32

## 2018-04-07 RX ADMIN — PREGABALIN 1000 MICROGRAM(S): 225 CAPSULE ORAL at 14:31

## 2018-04-07 RX ADMIN — ATORVASTATIN CALCIUM 80 MILLIGRAM(S): 80 TABLET, FILM COATED ORAL at 22:14

## 2018-04-07 RX ADMIN — POLYETHYLENE GLYCOL 3350 17 GRAM(S): 17 POWDER, FOR SOLUTION ORAL at 14:31

## 2018-04-07 NOTE — PROGRESS NOTE ADULT - ASSESSMENT
63y Male s/p left femoral to below knee popliteal artery bypass with left RSVG, jump graft from distal anastomosis to left PT using remaining RSV, doing well on floor    - Reg diet  - Strict I/O's  - Patient with likely anemia of chronic disease, down-trending H/H, will give 1u PRBC today, f/u post-transfusion CBC  - F/u nephrology recommendations   - DVT ppx  - IV antibiotics until discharge   - Discharge planning to rehab, pending authorization

## 2018-04-07 NOTE — PROGRESS NOTE ADULT - SUBJECTIVE AND OBJECTIVE BOX
GENERAL SURGERY DAILY PROGRESS NOTE:       Subjective:  Patient seen and examined at bedside, on morning rounds, patient without complaints. Reports that he is urinating well, pain is well controlled.        Objective:    PE:  Gen: Alert, NAD  Pulm: Airway patient, unlabored respirations   Ext: L ext dressing c/d/i, s/p R BKA, L palpable PT pulse, L DP signal incision stable  forefoot plantar aspect and  toes dorsum remain mummified      Vital Signs Last 24 Hrs  T(C): 36.8 (07 Apr 2018 05:44), Max: 36.8 (06 Apr 2018 21:57)  T(F): 98.2 (07 Apr 2018 05:44), Max: 98.2 (06 Apr 2018 21:57)  HR: 68 (07 Apr 2018 05:44) (64 - 70)  BP: 133/59 (07 Apr 2018 05:44) (121/57 - 142/65)  BP(mean): --  RR: 17 (07 Apr 2018 05:44) (16 - 18)  SpO2: 98% (07 Apr 2018 05:44) (96% - 100%)    I&O's Detail    06 Apr 2018 07:01  -  07 Apr 2018 07:00  --------------------------------------------------------  IN:    IV PiggyBack: 100 mL  Total IN: 100 mL    OUT:    Voided: 600 mL  Total OUT: 600 mL    Total NET: -500 mL          Daily     Daily     MEDICATIONS  (STANDING):  aspirin enteric coated 81 milliGRAM(s) Oral daily  atorvastatin 80 milliGRAM(s) Oral at bedtime  collagenase Ointment 1 Application(s) Topical daily  cyanocobalamin 1000 MICROGram(s) Oral daily  Dakins Solution - 1/2 Strength 1 Application(s) Topical daily  dextrose 50% Injectable 25 Gram(s) IV Push once  dextrose 50% Injectable 25 Gram(s) IV Push once  famotidine    Tablet 20 milliGRAM(s) Oral daily  furosemide    Tablet 60 milliGRAM(s) Oral daily  furosemide   Injectable 20 milliGRAM(s) IV Push once  gabapentin 200 milliGRAM(s) Oral daily  heparin  Injectable 5000 Unit(s) SubCutaneous every 8 hours  insulin lispro (HumaLOG) corrective regimen sliding scale   SubCutaneous three times a day before meals  labetalol 200 milliGRAM(s) Oral three times a day  multivitamin 1 Tablet(s) Oral daily  NIFEdipine XL 90 milliGRAM(s) Oral daily  oxyCODONE  ER Tablet 10 milliGRAM(s) Oral every 12 hours  pentoxifylline 400 milliGRAM(s) Oral three times a day  piperacillin/tazobactam IVPB. 3.375 Gram(s) IV Intermittent every 12 hours  polyethylene glycol 3350 17 Gram(s) Oral daily  sodium chloride 0.9% lock flush 3 milliLiter(s) IV Push every 8 hours  tamsulosin 0.4 milliGRAM(s) Oral at bedtime    MEDICATIONS  (PRN):  acetaminophen   Tablet. 650 milliGRAM(s) Oral every 6 hours PRN Moderate Pain (4 - 6)  benzocaine 15 mG/menthol 3.6 mG Lozenge 1 Lozenge Oral every 2 hours PRN Sore Throat  dextrose Gel 1 Dose(s) Oral once PRN Blood Glucose LESS THAN 70 milliGRAM(s)/deciliter  glucagon  Injectable 1 milliGRAM(s) IntraMuscular once PRN Glucose LESS THAN 70 milligrams/deciliter  oxyCODONE    5 mG/acetaminophen 325 mG 1 Tablet(s) Oral every 4 hours PRN Moderate Pain      LABS:                        7.0    8.18  )-----------( 280      ( 07 Apr 2018 05:00 )             21.4     04-07    140  |  104  |  64<H>  ----------------------------<  104<H>  4.2   |  19<L>  |  4.90<H>    Ca    8.3<L>      07 Apr 2018 05:00  Phos  5.6     04-07  Mg     2.3     04-07
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
## 2018-04-07 NOTE — PROVIDER CONTACT NOTE (CRITICAL VALUE NOTIFICATION) - ACTION/TREATMENT ORDERED:
Will give patient 2 units of PRBC
MD made aware. going to re-order STAT labs. will continue to monitor
MD velasquez.
Provider aware. Awaiting further orders.
Monitor patient if symptomatic.
MD aware, troponin levels to be monitored. next lab due at 12
MD SETHI notified, will order [facility] repeat CBC.

## 2018-04-07 NOTE — PROVIDER CONTACT NOTE (CRITICAL VALUE NOTIFICATION) - TEST AND RESULT REPORTED:
Hemoglobin 5.9 Hematocrit 18.2
troponin 0.07
H/H=6.2/20.3
h/h 6.5/19.9
H/H- 7.0/ 21.4
Hemaglobin 7.0
H/H: 6.5/20.4

## 2018-04-07 NOTE — PROGRESS NOTE ADULT - SUBJECTIVE AND OBJECTIVE BOX
Patient is a 63y old  Male who presents with a chief complaint of Left foot pain x 3 days. (19 Feb 2018 10:39)      SUBJECTIVE / OVERNIGHT EVENTS: Pt states pain improved with pain meds s/p BM. afebrile/no N/V    MEDICATIONS  (STANDING):  aspirin enteric coated 81 milliGRAM(s) Oral daily  atorvastatin 80 milliGRAM(s) Oral at bedtime  collagenase Ointment 1 Application(s) Topical daily  cyanocobalamin 1000 MICROGram(s) Oral daily  Dakins Solution - 1/2 Strength 1 Application(s) Topical daily  dextrose 50% Injectable 25 Gram(s) IV Push once  dextrose 50% Injectable 25 Gram(s) IV Push once  famotidine    Tablet 20 milliGRAM(s) Oral daily  furosemide    Tablet 60 milliGRAM(s) Oral daily  furosemide   Injectable 20 milliGRAM(s) IV Push once  gabapentin 200 milliGRAM(s) Oral daily  heparin  Injectable 5000 Unit(s) SubCutaneous every 8 hours  insulin lispro (HumaLOG) corrective regimen sliding scale   SubCutaneous three times a day before meals  labetalol 200 milliGRAM(s) Oral three times a day  multivitamin 1 Tablet(s) Oral daily  NIFEdipine XL 90 milliGRAM(s) Oral daily  oxyCODONE  ER Tablet 10 milliGRAM(s) Oral every 12 hours  pentoxifylline 400 milliGRAM(s) Oral three times a day  piperacillin/tazobactam IVPB. 3.375 Gram(s) IV Intermittent every 12 hours  polyethylene glycol 3350 17 Gram(s) Oral daily  sodium chloride 0.9% lock flush 3 milliLiter(s) IV Push every 8 hours  tamsulosin 0.4 milliGRAM(s) Oral at bedtime    MEDICATIONS  (PRN):  acetaminophen   Tablet. 650 milliGRAM(s) Oral every 6 hours PRN Moderate Pain (4 - 6)  benzocaine 15 mG/menthol 3.6 mG Lozenge 1 Lozenge Oral every 2 hours PRN Sore Throat  dextrose Gel 1 Dose(s) Oral once PRN Blood Glucose LESS THAN 70 milliGRAM(s)/deciliter  glucagon  Injectable 1 milliGRAM(s) IntraMuscular once PRN Glucose LESS THAN 70 milligrams/deciliter  oxyCODONE    5 mG/acetaminophen 325 mG 1 Tablet(s) Oral every 4 hours PRN Moderate Pain        CAPILLARY BLOOD GLUCOSE      POCT Blood Glucose.: 174 mg/dL (07 Apr 2018 13:06)  POCT Blood Glucose.: 191 mg/dL (07 Apr 2018 09:04)  POCT Blood Glucose.: 122 mg/dL (06 Apr 2018 21:50)  POCT Blood Glucose.: 129 mg/dL (06 Apr 2018 18:07)    I&O's Summary    06 Apr 2018 07:01  -  07 Apr 2018 07:00  --------------------------------------------------------  IN: 100 mL / OUT: 600 mL / NET: -500 mL    07 Apr 2018 07:01  -  07 Apr 2018 16:00  --------------------------------------------------------  IN: 0 mL / OUT: 100 mL / NET: -100 mL        T(C): 37.2 (04-07-18 @ 14:17), Max: 37.2 (04-07-18 @ 14:17)  HR: 79 (04-07-18 @ 14:17) (64 - 79)  BP: 141/63 (04-07-18 @ 14:17) (121/57 - 152/64)  RR: 18 (04-07-18 @ 14:17) (17 - 18)  SpO2: 100% (04-07-18 @ 14:17) (96% - 100%)    PHYSICAL EXAM:  GENERAL: middle-aged man lying in bed in NAD  HEAD:  Atraumatic, Normocephalic  EYES: EOMI  NECK: Supple, No JVD  CHEST/LUNG: nonlabored breathing  HEART: nl S1/S2  ABDOMEN: nondistended, soft  EXT: Lt LE staples RT BKA  PSYCH: alert, answering questions appropriately  NEUROLOGY: non-focal  SKIN: No rashes noted  LABS:    LABS:                        8.5    9.07  )-----------( 277      ( 07 Apr 2018 14:18 )             26.4     04-07    140  |  104  |  64<H>  ----------------------------<  104<H>  4.2   |  19<L>  |  4.90<H>    Ca    8.3<L>      07 Apr 2018 05:00  Phos  5.6     04-07  Mg     2.3     04-07                  RADIOLOGY & ADDITIONAL TESTS:    Imaging Personally Reviewed:    Consultant(s) Notes Reviewed:      Care Discussed with Consultants/Other Providers: Patient is a 63y old  Male who presents with a chief complaint of Left foot pain x 3 days. (19 Feb 2018 10:39)      SUBJECTIVE / OVERNIGHT EVENTS: Pt states pain improved with pain meds s/p BM. s/p bedside debridement by podiatry afebrile/no N/V    MEDICATIONS  (STANDING):  aspirin enteric coated 81 milliGRAM(s) Oral daily  atorvastatin 80 milliGRAM(s) Oral at bedtime  collagenase Ointment 1 Application(s) Topical daily  cyanocobalamin 1000 MICROGram(s) Oral daily  Dakins Solution - 1/2 Strength 1 Application(s) Topical daily  dextrose 50% Injectable 25 Gram(s) IV Push once  dextrose 50% Injectable 25 Gram(s) IV Push once  famotidine    Tablet 20 milliGRAM(s) Oral daily  furosemide    Tablet 60 milliGRAM(s) Oral daily  furosemide   Injectable 20 milliGRAM(s) IV Push once  gabapentin 200 milliGRAM(s) Oral daily  heparin  Injectable 5000 Unit(s) SubCutaneous every 8 hours  insulin lispro (HumaLOG) corrective regimen sliding scale   SubCutaneous three times a day before meals  labetalol 200 milliGRAM(s) Oral three times a day  multivitamin 1 Tablet(s) Oral daily  NIFEdipine XL 90 milliGRAM(s) Oral daily  oxyCODONE  ER Tablet 10 milliGRAM(s) Oral every 12 hours  pentoxifylline 400 milliGRAM(s) Oral three times a day  piperacillin/tazobactam IVPB. 3.375 Gram(s) IV Intermittent every 12 hours  polyethylene glycol 3350 17 Gram(s) Oral daily  sodium chloride 0.9% lock flush 3 milliLiter(s) IV Push every 8 hours  tamsulosin 0.4 milliGRAM(s) Oral at bedtime    MEDICATIONS  (PRN):  acetaminophen   Tablet. 650 milliGRAM(s) Oral every 6 hours PRN Moderate Pain (4 - 6)  benzocaine 15 mG/menthol 3.6 mG Lozenge 1 Lozenge Oral every 2 hours PRN Sore Throat  dextrose Gel 1 Dose(s) Oral once PRN Blood Glucose LESS THAN 70 milliGRAM(s)/deciliter  glucagon  Injectable 1 milliGRAM(s) IntraMuscular once PRN Glucose LESS THAN 70 milligrams/deciliter  oxyCODONE    5 mG/acetaminophen 325 mG 1 Tablet(s) Oral every 4 hours PRN Moderate Pain        CAPILLARY BLOOD GLUCOSE      POCT Blood Glucose.: 174 mg/dL (07 Apr 2018 13:06)  POCT Blood Glucose.: 191 mg/dL (07 Apr 2018 09:04)  POCT Blood Glucose.: 122 mg/dL (06 Apr 2018 21:50)  POCT Blood Glucose.: 129 mg/dL (06 Apr 2018 18:07)    I&O's Summary    06 Apr 2018 07:01  -  07 Apr 2018 07:00  --------------------------------------------------------  IN: 100 mL / OUT: 600 mL / NET: -500 mL    07 Apr 2018 07:01  -  07 Apr 2018 16:00  --------------------------------------------------------  IN: 0 mL / OUT: 100 mL / NET: -100 mL        T(C): 37.2 (04-07-18 @ 14:17), Max: 37.2 (04-07-18 @ 14:17)  HR: 79 (04-07-18 @ 14:17) (64 - 79)  BP: 141/63 (04-07-18 @ 14:17) (121/57 - 152/64)  RR: 18 (04-07-18 @ 14:17) (17 - 18)  SpO2: 100% (04-07-18 @ 14:17) (96% - 100%)    PHYSICAL EXAM:  GENERAL: middle-aged man lying in bed in NAD  HEAD:  Atraumatic, Normocephalic  EYES: EOMI  NECK: Supple, No JVD  CHEST/LUNG: nonlabored breathing  HEART: nl S1/S2  ABDOMEN: nondistended, soft  EXT: Lt LE staples RT BKA  PSYCH: alert, answering questions appropriately  NEUROLOGY: non-focal  SKIN: No rashes noted  LABS:    LABS:                        8.5    9.07  )-----------( 277      ( 07 Apr 2018 14:18 )             26.4     04-07    140  |  104  |  64<H>  ----------------------------<  104<H>  4.2   |  19<L>  |  4.90<H>    Ca    8.3<L>      07 Apr 2018 05:00  Phos  5.6     04-07  Mg     2.3     04-07                  RADIOLOGY & ADDITIONAL TESTS:    Imaging Personally Reviewed:    Consultant(s) Notes Reviewed:      Care Discussed with Consultants/Other Providers:

## 2018-04-07 NOTE — PROGRESS NOTE ADULT - ASSESSMENT
63 y.o. Male w/ hx DM, HTN, CKD stage 4, PVD s/p Rt BKA in 2009 p/w sepsis 2/2 cellulitis and LLE SFA disease was to have Lt fem-pop bypass but delayed due to acute hypoxic respiratory failure 2/2 fluid overload from acute diastolic CHF. Acute diastolic HF now resolved after bumex gtt now s/p  left femoral to below knee popliteal artery bypass with left RSVG, jump graft from distal anastomosis to left PT using remaining RSVG on 3/29    *Peripheral arterial disease  - s/p angiogram with multi vessel disease s/p LE dopplers & vein mapping  KENN .71 on left.   - s/p Femoral-popliteal bypass 3/29  - management as per vascular  - LLE improved with gabapentin and opiates  - c/w ASA and statin    *Anemia: possible acute blood loss anemia in setting of recent surgery. No melena or visible blood loss- per discussion with vascular team, stool was hemoccult negative  - monitor hgb  -agree with transfusion 1 u prbc       *Acute respiratory failure with hypoxia  - s/p bumex gtt x3 days   - c/w lasix 60mg  PO daily      *ATN (acute tubular necrosis)  - renal consulted  - Creatinine stable CKD IV in the setting of ATN from sepsis & contrast induced nephropathy   - renal US negative for hydronephrosis  - avoid nephrotoxins    *Wound infection  - Left foot with cellulitis with resulting sepsis now resolved.  - S/p vancomycin on zosyn currently    *Hypertension, unspecified type  - BP acceptable  - c/w Nifedipine and labetalol    *Type 2 diabetes mellitus with complication, without long-term current use of insulin  - Pt on oral medications at home. Well controlled A1c-5.9  - c/w ISS while inpatient, will need to adjust oral meds upon dc given new baseline renal function    *BPH: cont tamsulosin

## 2018-04-08 LAB
APPEARANCE UR: SIGNIFICANT CHANGE UP
BACTERIA # UR AUTO: SIGNIFICANT CHANGE UP
BILIRUB UR-MCNC: NEGATIVE — SIGNIFICANT CHANGE UP
BLOOD UR QL VISUAL: HIGH
BUN SERPL-MCNC: 71 MG/DL — HIGH (ref 7–23)
CALCIUM SERPL-MCNC: 8.4 MG/DL — SIGNIFICANT CHANGE UP (ref 8.4–10.5)
CHLORIDE SERPL-SCNC: 103 MMOL/L — SIGNIFICANT CHANGE UP (ref 98–107)
CO2 SERPL-SCNC: 19 MMOL/L — LOW (ref 22–31)
COLOR SPEC: YELLOW — SIGNIFICANT CHANGE UP
CREAT SERPL-MCNC: 5.36 MG/DL — HIGH (ref 0.5–1.3)
GLUCOSE BLDC GLUCOMTR-MCNC: 127 MG/DL — HIGH (ref 70–99)
GLUCOSE BLDC GLUCOMTR-MCNC: 151 MG/DL — HIGH (ref 70–99)
GLUCOSE BLDC GLUCOMTR-MCNC: 168 MG/DL — HIGH (ref 70–99)
GLUCOSE BLDC GLUCOMTR-MCNC: 219 MG/DL — HIGH (ref 70–99)
GLUCOSE SERPL-MCNC: 130 MG/DL — HIGH (ref 70–99)
GLUCOSE UR-MCNC: NEGATIVE — SIGNIFICANT CHANGE UP
HCT VFR BLD CALC: 25.6 % — LOW (ref 39–50)
HGB BLD-MCNC: 8.3 G/DL — LOW (ref 13–17)
KETONES UR-MCNC: NEGATIVE — SIGNIFICANT CHANGE UP
LEUKOCYTE ESTERASE UR-ACNC: SIGNIFICANT CHANGE UP
MAGNESIUM SERPL-MCNC: 2.4 MG/DL — SIGNIFICANT CHANGE UP (ref 1.6–2.6)
MCHC RBC-ENTMCNC: 27.8 PG — SIGNIFICANT CHANGE UP (ref 27–34)
MCHC RBC-ENTMCNC: 32.4 % — SIGNIFICANT CHANGE UP (ref 32–36)
MCV RBC AUTO: 85.6 FL — SIGNIFICANT CHANGE UP (ref 80–100)
MUCOUS THREADS # UR AUTO: SIGNIFICANT CHANGE UP
NITRITE UR-MCNC: NEGATIVE — SIGNIFICANT CHANGE UP
NRBC # FLD: 0 — SIGNIFICANT CHANGE UP
PH UR: 5.5 — SIGNIFICANT CHANGE UP (ref 4.6–8)
PHOSPHATE SERPL-MCNC: 6 MG/DL — HIGH (ref 2.5–4.5)
PLATELET # BLD AUTO: 310 K/UL — SIGNIFICANT CHANGE UP (ref 150–400)
PMV BLD: 11.3 FL — SIGNIFICANT CHANGE UP (ref 7–13)
POTASSIUM SERPL-MCNC: 4.6 MMOL/L — SIGNIFICANT CHANGE UP (ref 3.5–5.3)
POTASSIUM SERPL-SCNC: 4.6 MMOL/L — SIGNIFICANT CHANGE UP (ref 3.5–5.3)
PROT UR-MCNC: 100 MG/DL — SIGNIFICANT CHANGE UP
RBC # BLD: 2.99 M/UL — LOW (ref 4.2–5.8)
RBC # FLD: 15.6 % — HIGH (ref 10.3–14.5)
RBC CASTS # UR COMP ASSIST: SIGNIFICANT CHANGE UP (ref 0–?)
SODIUM SERPL-SCNC: 140 MMOL/L — SIGNIFICANT CHANGE UP (ref 135–145)
SP GR SPEC: 1.02 — SIGNIFICANT CHANGE UP (ref 1–1.04)
SQUAMOUS # UR AUTO: SIGNIFICANT CHANGE UP
UROBILINOGEN FLD QL: NORMAL MG/DL — SIGNIFICANT CHANGE UP
WBC # BLD: 9.82 K/UL — SIGNIFICANT CHANGE UP (ref 3.8–10.5)
WBC # FLD AUTO: 9.82 K/UL — SIGNIFICANT CHANGE UP (ref 3.8–10.5)
WBC UR QL: SIGNIFICANT CHANGE UP (ref 0–?)

## 2018-04-08 PROCEDURE — 99233 SBSQ HOSP IP/OBS HIGH 50: CPT

## 2018-04-08 RX ORDER — GABAPENTIN 400 MG/1
100 CAPSULE ORAL DAILY
Refills: 0 | Status: DISCONTINUED | OUTPATIENT
Start: 2018-04-08 | End: 2018-04-11

## 2018-04-08 RX ADMIN — SODIUM CHLORIDE 3 MILLILITER(S): 9 INJECTION INTRAMUSCULAR; INTRAVENOUS; SUBCUTANEOUS at 22:01

## 2018-04-08 RX ADMIN — PIPERACILLIN AND TAZOBACTAM 25 GRAM(S): 4; .5 INJECTION, POWDER, LYOPHILIZED, FOR SOLUTION INTRAVENOUS at 05:41

## 2018-04-08 RX ADMIN — Medication 200 MILLIGRAM(S): at 05:42

## 2018-04-08 RX ADMIN — OXYCODONE HYDROCHLORIDE 10 MILLIGRAM(S): 5 TABLET ORAL at 17:25

## 2018-04-08 RX ADMIN — PIPERACILLIN AND TAZOBACTAM 25 GRAM(S): 4; .5 INJECTION, POWDER, LYOPHILIZED, FOR SOLUTION INTRAVENOUS at 17:25

## 2018-04-08 RX ADMIN — Medication 81 MILLIGRAM(S): at 12:16

## 2018-04-08 RX ADMIN — SODIUM CHLORIDE 3 MILLILITER(S): 9 INJECTION INTRAMUSCULAR; INTRAVENOUS; SUBCUTANEOUS at 07:47

## 2018-04-08 RX ADMIN — ATORVASTATIN CALCIUM 80 MILLIGRAM(S): 80 TABLET, FILM COATED ORAL at 21:57

## 2018-04-08 RX ADMIN — OXYCODONE HYDROCHLORIDE 10 MILLIGRAM(S): 5 TABLET ORAL at 05:42

## 2018-04-08 RX ADMIN — HEPARIN SODIUM 5000 UNIT(S): 5000 INJECTION INTRAVENOUS; SUBCUTANEOUS at 05:41

## 2018-04-08 RX ADMIN — Medication 60 MILLIGRAM(S): at 05:43

## 2018-04-08 RX ADMIN — GABAPENTIN 100 MILLIGRAM(S): 400 CAPSULE ORAL at 12:15

## 2018-04-08 RX ADMIN — FAMOTIDINE 20 MILLIGRAM(S): 10 INJECTION INTRAVENOUS at 12:16

## 2018-04-08 RX ADMIN — Medication 2: at 18:27

## 2018-04-08 RX ADMIN — Medication 1 TABLET(S): at 12:16

## 2018-04-08 RX ADMIN — OXYCODONE HYDROCHLORIDE 10 MILLIGRAM(S): 5 TABLET ORAL at 06:50

## 2018-04-08 RX ADMIN — Medication 400 MILLIGRAM(S): at 05:43

## 2018-04-08 RX ADMIN — Medication 200 MILLIGRAM(S): at 21:57

## 2018-04-08 RX ADMIN — Medication 400 MILLIGRAM(S): at 13:29

## 2018-04-08 RX ADMIN — TAMSULOSIN HYDROCHLORIDE 0.4 MILLIGRAM(S): 0.4 CAPSULE ORAL at 21:57

## 2018-04-08 RX ADMIN — Medication 4: at 13:28

## 2018-04-08 RX ADMIN — HEPARIN SODIUM 5000 UNIT(S): 5000 INJECTION INTRAVENOUS; SUBCUTANEOUS at 13:28

## 2018-04-08 RX ADMIN — SODIUM CHLORIDE 3 MILLILITER(S): 9 INJECTION INTRAMUSCULAR; INTRAVENOUS; SUBCUTANEOUS at 13:28

## 2018-04-08 RX ADMIN — HEPARIN SODIUM 5000 UNIT(S): 5000 INJECTION INTRAVENOUS; SUBCUTANEOUS at 21:57

## 2018-04-08 RX ADMIN — Medication 400 MILLIGRAM(S): at 21:57

## 2018-04-08 RX ADMIN — Medication 200 MILLIGRAM(S): at 13:28

## 2018-04-08 RX ADMIN — Medication 90 MILLIGRAM(S): at 05:42

## 2018-04-08 RX ADMIN — PREGABALIN 1000 MICROGRAM(S): 225 CAPSULE ORAL at 12:16

## 2018-04-08 NOTE — PROGRESS NOTE ADULT - ASSESSMENT
63y Male s/p left femoral to below knee popliteal artery bypass with left RSVG, jump graft from distal anastomosis to left PT using remaining RSV, received pRBC for low H/H 4/7    - Reg diet  - Strict I/O's  - Patient with likely anemia of chronic disease,  - F/u nephrology recommendations   - DVT ppx  - IV antibiotics until discharge   - Discharge planning to rehab, pending authorization     [id number]

## 2018-04-08 NOTE — PROGRESS NOTE ADULT - ASSESSMENT
63 y.o. Male w/ hx DM, HTN, CKD stage 4, PVD s/p Rt BKA in 2009 p/w sepsis 2/2 cellulitis and LLE SFA disease was to have Lt fem-pop bypass but delayed due to acute hypoxic respiratory failure 2/2 fluid overload from acute diastolic CHF. Acute diastolic HF now resolved after bumex gtt now s/p  left femoral to below knee popliteal artery bypass with left RSVG, jump graft from distal anastomosis to left PT using remaining RSVG on 3/29    *Peripheral arterial disease  - s/p angiogram with multi vessel disease s/p LE dopplers & vein mapping  KENN .71 on left.   - s/p Femoral-popliteal bypass 3/29  - management as per vascular  - LLE improved with gabapentin and opiates  - c/w ASA and statin    *Anemia: possible acute blood loss anemia in setting of recent surgery and CKD. Likely from recent debridement and chronic oozing from LT leg debridement No melena or visible blood loss- per discussion with vascular team, stool was hemoccult negative  - monitor hgb  -transfuse as needed mainatain h/H above 7/21      *Acute respiratory failure with hypoxia  - s/p bumex gtt x3 days   - c/w lasix 60mg  PO daily      *ATN (acute tubular necrosis)  - renal board  - Creatinine worsening CKD IV in the setting of ATN from sepsis & contrast induced nephropathy check PVR  - renal US negative for hydronephrosis  - avoid nephrotoxins    *Wound infection  - Left foot with cellulitis with resulting sepsis now resolved.  - S/p vancomycin on zosyn currently antibiotics as per vascular    *Hypertension, unspecified type  - BP acceptable  - c/w Nifedipine and labetalol    *Type 2 diabetes mellitus with complication, without long-term current use of insulin  - Pt on oral medications at home. Well controlled A1c-5.9  - c/w ISS while inpatient, will need to adjust oral meds upon dc given new baseline renal function    *BPH: cont tamsulosin

## 2018-04-08 NOTE — PROGRESS NOTE ADULT - SUBJECTIVE AND OBJECTIVE BOX
C Team Vascular Surgery Progress Note (p [id number])    SUBJECTIVE:  The patient was seen and examined this morning during rounds. s/p L foot bedside debridement by podiatry yesterday. Received 1u pRBC yesterday and responded. AM labs show H/H stable.    OBJECTIVE:     ** VITAL SIGNS / I&O's **    Vital Signs Last 24 Hrs  T(C): 36.3 (08 Apr 2018 05:40), Max: 37.2 (07 Apr 2018 14:17)  T(F): 97.4 (08 Apr 2018 05:40), Max: 99 (07 Apr 2018 14:17)  HR: 68 (08 Apr 2018 05:40) (62 - 79)  BP: 138/63 (08 Apr 2018 05:40) (130/54 - 152/64)  BP(mean): --  RR: 16 (08 Apr 2018 05:40) (16 - 18)  SpO2: 98% (08 Apr 2018 05:40) (95% - 100%)      07 Apr 2018 07:01  -  08 Apr 2018 07:00  --------------------------------------------------------  IN:  Total IN: 0 mL    OUT:    Voided: 300 mL  Total OUT: 300 mL    Total NET: -300 mL          ** PHYSICAL EXAM **    PE:  Gen: Alert, NAD  Pulm: Airway patient, unlabored respirations   Ext: L ext dressing c/d/i, s/p R BKA, L palpable PT pulse, L DP signal, incision stable  forefoot plantar aspect and  toes dorsum remain mummified    ** LABS **                          8.3    9.82  )-----------( 310      ( 08 Apr 2018 07:23 )             25.6     07 Apr 2018 05:00    140    |  104    |  64     ----------------------------<  104    4.2     |  19     |  4.90     Ca    8.3        07 Apr 2018 05:00  Phos  5.6       07 Apr 2018 05:00  Mg     2.3       07 Apr 2018 05:00        CAPILLARY BLOOD GLUCOSE      POCT Blood Glucose.: 156 mg/dL (07 Apr 2018 22:01)  POCT Blood Glucose.: 143 mg/dL (07 Apr 2018 18:15)  POCT Blood Glucose.: 174 mg/dL (07 Apr 2018 13:06)  POCT Blood Glucose.: 191 mg/dL (07 Apr 2018 09:04)              MEDICATIONS  (STANDING):  aspirin enteric coated 81 milliGRAM(s) Oral daily  atorvastatin 80 milliGRAM(s) Oral at bedtime  collagenase Ointment 1 Application(s) Topical daily  cyanocobalamin 1000 MICROGram(s) Oral daily  Dakins Solution - 1/2 Strength 1 Application(s) Topical daily  dextrose 50% Injectable 25 Gram(s) IV Push once  dextrose 50% Injectable 25 Gram(s) IV Push once  famotidine    Tablet 20 milliGRAM(s) Oral daily  furosemide    Tablet 60 milliGRAM(s) Oral daily  furosemide   Injectable 20 milliGRAM(s) IV Push once  gabapentin 200 milliGRAM(s) Oral daily  heparin  Injectable 5000 Unit(s) SubCutaneous every 8 hours  insulin lispro (HumaLOG) corrective regimen sliding scale   SubCutaneous three times a day before meals  labetalol 200 milliGRAM(s) Oral three times a day  multivitamin 1 Tablet(s) Oral daily  NIFEdipine XL 90 milliGRAM(s) Oral daily  oxyCODONE  ER Tablet 10 milliGRAM(s) Oral every 12 hours  pentoxifylline 400 milliGRAM(s) Oral three times a day  piperacillin/tazobactam IVPB. 3.375 Gram(s) IV Intermittent every 12 hours  polyethylene glycol 3350 17 Gram(s) Oral daily  sodium chloride 0.9% lock flush 3 milliLiter(s) IV Push every 8 hours  tamsulosin 0.4 milliGRAM(s) Oral at bedtime    MEDICATIONS  (PRN):  acetaminophen   Tablet. 650 milliGRAM(s) Oral every 6 hours PRN Moderate Pain (4 - 6)  benzocaine 15 mG/menthol 3.6 mG Lozenge 1 Lozenge Oral every 2 hours PRN Sore Throat  dextrose Gel 1 Dose(s) Oral once PRN Blood Glucose LESS THAN 70 milliGRAM(s)/deciliter  glucagon  Injectable 1 milliGRAM(s) IntraMuscular once PRN Glucose LESS THAN 70 milligrams/deciliter  oxyCODONE    5 mG/acetaminophen 325 mG 1 Tablet(s) Oral every 4 hours PRN Moderate Pain

## 2018-04-08 NOTE — PROGRESS NOTE ADULT - SUBJECTIVE AND OBJECTIVE BOX
Patient is a 63y old  Male who presents with a chief complaint of Left foot pain x 3 days. (2018 10:39)      SUBJECTIVE / OVERNIGHT EVENTS: Pt oozing from dressing     MEDICATIONS  (STANDING):  aspirin enteric coated 81 milliGRAM(s) Oral daily  atorvastatin 80 milliGRAM(s) Oral at bedtime  collagenase Ointment 1 Application(s) Topical daily  cyanocobalamin 1000 MICROGram(s) Oral daily  Dakins Solution - 1/2 Strength 1 Application(s) Topical daily  dextrose 50% Injectable 25 Gram(s) IV Push once  dextrose 50% Injectable 25 Gram(s) IV Push once  famotidine    Tablet 20 milliGRAM(s) Oral daily  furosemide    Tablet 60 milliGRAM(s) Oral daily  furosemide   Injectable 20 milliGRAM(s) IV Push once  gabapentin 100 milliGRAM(s) Oral daily  heparin  Injectable 5000 Unit(s) SubCutaneous every 8 hours  insulin lispro (HumaLOG) corrective regimen sliding scale   SubCutaneous three times a day before meals  labetalol 200 milliGRAM(s) Oral three times a day  multivitamin 1 Tablet(s) Oral daily  NIFEdipine XL 90 milliGRAM(s) Oral daily  oxyCODONE  ER Tablet 10 milliGRAM(s) Oral every 12 hours  pentoxifylline 400 milliGRAM(s) Oral three times a day  piperacillin/tazobactam IVPB. 3.375 Gram(s) IV Intermittent every 12 hours  polyethylene glycol 3350 17 Gram(s) Oral daily  sodium chloride 0.9% lock flush 3 milliLiter(s) IV Push every 8 hours  tamsulosin 0.4 milliGRAM(s) Oral at bedtime    MEDICATIONS  (PRN):  acetaminophen   Tablet. 650 milliGRAM(s) Oral every 6 hours PRN Moderate Pain (4 - 6)  benzocaine 15 mG/menthol 3.6 mG Lozenge 1 Lozenge Oral every 2 hours PRN Sore Throat  dextrose Gel 1 Dose(s) Oral once PRN Blood Glucose LESS THAN 70 milliGRAM(s)/deciliter  glucagon  Injectable 1 milliGRAM(s) IntraMuscular once PRN Glucose LESS THAN 70 milligrams/deciliter  oxyCODONE    5 mG/acetaminophen 325 mG 1 Tablet(s) Oral every 4 hours PRN Moderate Pain        CAPILLARY BLOOD GLUCOSE      POCT Blood Glucose.: 219 mg/dL (2018 13:04)  POCT Blood Glucose.: 127 mg/dL (2018 09:14)  POCT Blood Glucose.: 156 mg/dL (2018 22:01)  POCT Blood Glucose.: 143 mg/dL (2018 18:15)    I&O's Summary    2018 07:01  -  2018 07:00  --------------------------------------------------------  IN: 0 mL / OUT: 300 mL / NET: -300 mL    2018 07:01  -  2018 13:06  --------------------------------------------------------  IN: 0 mL / OUT: 200 mL / NET: -200 mL        T(C): 36.3 (18 @ 10:39), Max: 37.2 (18 @ 14:17)  HR: 66 (18 @ 10:39) (62 - 79)  BP: 138/70 (18 @ 10:39) (130/54 - 141/63)  RR: 20 (18 @ 10:39) (16 - 20)  SpO2: 100% (18 @ 10:39) (95% - 100%)    PHYSICAL EXAM:  GENERAL: middle-aged man lying in bed in NAD  HEAD:  Atraumatic, Normocephalic  EYES: EOMI  NECK: Supple, No JVD  CHEST/LUNG: nonlabored breathing  HEART: nl S1/S2  ABDOMEN: nondistended, soft  EXT: Lt LE staples with bandage blood soaked RT BKA  PSYCH: alert, answering questions appropriately  NEUROLOGY: non-focal  SKIN: No rashes noted    LABS:                        8.3    9.82  )-----------( 310      ( 2018 07:23 )             25.6     -08    140  |  103  |  71<H>  ----------------------------<  130<H>  4.6   |  19<L>  |  5.36<H>    Ca    8.4      2018 07:23  Phos  6.0     04-08  Mg     2.4     04-08            Urinalysis Basic - ( 2018 12:03 )    Color: YELLOW / Appearance: TURBID / S.020 / pH: 5.5  Gluc: NEGATIVE / Ketone: NEGATIVE  / Bili: NEGATIVE / Urobili: NORMAL mg/dL   Blood: MODERATE / Protein: 100 mg/dL / Nitrite: NEGATIVE   Leuk Esterase: TRACE / RBC: 2-5 / WBC 25-50   Sq Epi: OCC / Non Sq Epi: x / Bacteria: FEW          RADIOLOGY & ADDITIONAL TESTS:    Imaging Personally Reviewed:    Consultant(s) Notes Reviewed:      Care Discussed with Consultants/Other Providers:

## 2018-04-09 LAB
BUN SERPL-MCNC: 74 MG/DL — HIGH (ref 7–23)
C3 SERPL-MCNC: 148 MG/DL — SIGNIFICANT CHANGE UP (ref 80–180)
C4 SERPL-MCNC: 53 MG/DL — HIGH (ref 10–45)
CALCIUM SERPL-MCNC: 8.1 MG/DL — LOW (ref 8.4–10.5)
CHLORIDE SERPL-SCNC: 102 MMOL/L — SIGNIFICANT CHANGE UP (ref 98–107)
CO2 SERPL-SCNC: 17 MMOL/L — LOW (ref 22–31)
CREAT SERPL-MCNC: 6 MG/DL — HIGH (ref 0.5–1.3)
GLUCOSE BLDC GLUCOMTR-MCNC: 123 MG/DL — HIGH (ref 70–99)
GLUCOSE BLDC GLUCOMTR-MCNC: 137 MG/DL — HIGH (ref 70–99)
GLUCOSE BLDC GLUCOMTR-MCNC: 174 MG/DL — HIGH (ref 70–99)
GLUCOSE BLDC GLUCOMTR-MCNC: 188 MG/DL — HIGH (ref 70–99)
GLUCOSE SERPL-MCNC: 142 MG/DL — HIGH (ref 70–99)
HCT VFR BLD CALC: 25.7 % — LOW (ref 39–50)
HGB BLD-MCNC: 8.2 G/DL — LOW (ref 13–17)
MAGNESIUM SERPL-MCNC: 2.3 MG/DL — SIGNIFICANT CHANGE UP (ref 1.6–2.6)
MCHC RBC-ENTMCNC: 28 PG — SIGNIFICANT CHANGE UP (ref 27–34)
MCHC RBC-ENTMCNC: 31.9 % — LOW (ref 32–36)
MCV RBC AUTO: 87.7 FL — SIGNIFICANT CHANGE UP (ref 80–100)
NRBC # FLD: 0.02 — SIGNIFICANT CHANGE UP
PHOSPHATE SERPL-MCNC: 6.3 MG/DL — HIGH (ref 2.5–4.5)
PLATELET # BLD AUTO: 292 K/UL — SIGNIFICANT CHANGE UP (ref 150–400)
PMV BLD: 11.7 FL — SIGNIFICANT CHANGE UP (ref 7–13)
POTASSIUM SERPL-MCNC: 4.7 MMOL/L — SIGNIFICANT CHANGE UP (ref 3.5–5.3)
POTASSIUM SERPL-SCNC: 4.7 MMOL/L — SIGNIFICANT CHANGE UP (ref 3.5–5.3)
RBC # BLD: 2.93 M/UL — LOW (ref 4.2–5.8)
RBC # FLD: 15.1 % — HIGH (ref 10.3–14.5)
SODIUM SERPL-SCNC: 138 MMOL/L — SIGNIFICANT CHANGE UP (ref 135–145)
WBC # BLD: 8.67 K/UL — SIGNIFICANT CHANGE UP (ref 3.8–10.5)
WBC # FLD AUTO: 8.67 K/UL — SIGNIFICANT CHANGE UP (ref 3.8–10.5)

## 2018-04-09 PROCEDURE — 76775 US EXAM ABDO BACK WALL LIM: CPT | Mod: 26

## 2018-04-09 PROCEDURE — 99233 SBSQ HOSP IP/OBS HIGH 50: CPT

## 2018-04-09 PROCEDURE — 99232 SBSQ HOSP IP/OBS MODERATE 35: CPT

## 2018-04-09 RX ORDER — SODIUM CHLORIDE 9 MG/ML
1000 INJECTION INTRAMUSCULAR; INTRAVENOUS; SUBCUTANEOUS
Refills: 0 | Status: DISCONTINUED | OUTPATIENT
Start: 2018-04-09 | End: 2018-04-13

## 2018-04-09 RX ORDER — CALCIUM ACETATE 667 MG
1334 TABLET ORAL
Refills: 0 | Status: DISCONTINUED | OUTPATIENT
Start: 2018-04-09 | End: 2018-04-11

## 2018-04-09 RX ADMIN — Medication 2: at 18:01

## 2018-04-09 RX ADMIN — PREGABALIN 1000 MICROGRAM(S): 225 CAPSULE ORAL at 11:40

## 2018-04-09 RX ADMIN — Medication 81 MILLIGRAM(S): at 11:40

## 2018-04-09 RX ADMIN — Medication 400 MILLIGRAM(S): at 13:38

## 2018-04-09 RX ADMIN — Medication 400 MILLIGRAM(S): at 21:27

## 2018-04-09 RX ADMIN — SODIUM CHLORIDE 75 MILLILITER(S): 9 INJECTION INTRAMUSCULAR; INTRAVENOUS; SUBCUTANEOUS at 14:02

## 2018-04-09 RX ADMIN — FAMOTIDINE 20 MILLIGRAM(S): 10 INJECTION INTRAVENOUS at 11:40

## 2018-04-09 RX ADMIN — GABAPENTIN 100 MILLIGRAM(S): 400 CAPSULE ORAL at 11:40

## 2018-04-09 RX ADMIN — PIPERACILLIN AND TAZOBACTAM 25 GRAM(S): 4; .5 INJECTION, POWDER, LYOPHILIZED, FOR SOLUTION INTRAVENOUS at 05:19

## 2018-04-09 RX ADMIN — SODIUM CHLORIDE 3 MILLILITER(S): 9 INJECTION INTRAMUSCULAR; INTRAVENOUS; SUBCUTANEOUS at 05:17

## 2018-04-09 RX ADMIN — OXYCODONE HYDROCHLORIDE 10 MILLIGRAM(S): 5 TABLET ORAL at 05:24

## 2018-04-09 RX ADMIN — ATORVASTATIN CALCIUM 80 MILLIGRAM(S): 80 TABLET, FILM COATED ORAL at 21:27

## 2018-04-09 RX ADMIN — SODIUM CHLORIDE 3 MILLILITER(S): 9 INJECTION INTRAMUSCULAR; INTRAVENOUS; SUBCUTANEOUS at 13:38

## 2018-04-09 RX ADMIN — HEPARIN SODIUM 5000 UNIT(S): 5000 INJECTION INTRAVENOUS; SUBCUTANEOUS at 05:18

## 2018-04-09 RX ADMIN — TAMSULOSIN HYDROCHLORIDE 0.4 MILLIGRAM(S): 0.4 CAPSULE ORAL at 21:27

## 2018-04-09 RX ADMIN — Medication 1 TABLET(S): at 11:41

## 2018-04-09 RX ADMIN — OXYCODONE HYDROCHLORIDE 10 MILLIGRAM(S): 5 TABLET ORAL at 19:26

## 2018-04-09 RX ADMIN — HEPARIN SODIUM 5000 UNIT(S): 5000 INJECTION INTRAVENOUS; SUBCUTANEOUS at 13:38

## 2018-04-09 RX ADMIN — Medication 400 MILLIGRAM(S): at 05:18

## 2018-04-09 RX ADMIN — Medication 2: at 13:39

## 2018-04-09 RX ADMIN — Medication 90 MILLIGRAM(S): at 05:18

## 2018-04-09 RX ADMIN — Medication 200 MILLIGRAM(S): at 13:38

## 2018-04-09 RX ADMIN — Medication 1334 MILLIGRAM(S): at 18:00

## 2018-04-09 RX ADMIN — Medication 60 MILLIGRAM(S): at 05:19

## 2018-04-09 RX ADMIN — HEPARIN SODIUM 5000 UNIT(S): 5000 INJECTION INTRAVENOUS; SUBCUTANEOUS at 21:27

## 2018-04-09 RX ADMIN — OXYCODONE HYDROCHLORIDE 10 MILLIGRAM(S): 5 TABLET ORAL at 18:00

## 2018-04-09 RX ADMIN — Medication 200 MILLIGRAM(S): at 05:18

## 2018-04-09 RX ADMIN — Medication 200 MILLIGRAM(S): at 21:27

## 2018-04-09 NOTE — PROGRESS NOTE ADULT - ASSESSMENT
63 y.o. Male w/ hx DM, HTN, CKD stage 4, PVD s/p Rt BKA in 2009 p/w sepsis 2/2 cellulitis and LLE SFA disease was to have Lt fem-pop bypass but delayed due to acute hypoxic respiratory failure 2/2 fluid overload from acute diastolic CHF. Acute diastolic HF now resolved after bumex gtt now s/p  left femoral to below knee popliteal artery bypass with left RSVG, jump graft from distal anastomosis to left PT using remaining RSVG on 3/29    *Peripheral arterial disease  - s/p angiogram with multi vessel disease s/p LE dopplers & vein mapping  KENN .71 on left.   - s/p Femoral-popliteal bypass 3/29  - management as per vascular  - LLE improved with gabapentin and opiates  - c/w ASA and statin    *Anemia: possible acute blood loss anemia in setting of recent surgery and CKD. Likely from recent debridement and chronic oozing from LT leg debridement No melena or visible blood loss- per discussion with vascular team, stool was hemoccult negative  - monitor hgb, stable   -transfuse as needed maintain h/H above 7/21      *Acute respiratory failure with hypoxia, resolved   - s/p bumex gtt x3 days   Hold off standing lasix for now with rising Cr      *ATN (acute tubular necrosis)  - renal board  - Creatinine worsening CKD IV in the setting of ATN from sepsis & contrast induced nephropathy   - renal US negative for hydronephrosis  - avoid nephrotoxins    *Wound infection  - Left foot with cellulitis with resulting sepsis now resolved.  - S/p vancomycin on zosyn currently antibiotics as per vascular    *Hypertension, unspecified type  - BP acceptable  - c/w Nifedipine and labetalol    *Type 2 diabetes mellitus with complication, without long-term current use of insulin  - Pt on oral medications at home. Well controlled A1c-5.9  - c/w ISS while inpatient, will need to adjust oral meds upon dc given new baseline renal function    *BPH: cont tamsulosin

## 2018-04-09 NOTE — PROGRESS NOTE ADULT - SUBJECTIVE AND OBJECTIVE BOX
Patient is a 63y old  Male who presents with a chief complaint of Left foot pain x 3 days. (2018 10:39)      SUBJECTIVE / OVERNIGHT EVENTS:  no event    MEDICATIONS  (STANDING):  aspirin enteric coated 81 milliGRAM(s) Oral daily  atorvastatin 80 milliGRAM(s) Oral at bedtime  calcium acetate 1334 milliGRAM(s) Oral three times a day with meals  collagenase Ointment 1 Application(s) Topical daily  cyanocobalamin 1000 MICROGram(s) Oral daily  Dakins Solution - 1/2 Strength 1 Application(s) Topical daily  dextrose 50% Injectable 25 Gram(s) IV Push once  dextrose 50% Injectable 25 Gram(s) IV Push once  famotidine    Tablet 20 milliGRAM(s) Oral daily  furosemide   Injectable 20 milliGRAM(s) IV Push once  gabapentin 100 milliGRAM(s) Oral daily  heparin  Injectable 5000 Unit(s) SubCutaneous every 8 hours  insulin lispro (HumaLOG) corrective regimen sliding scale   SubCutaneous three times a day before meals  labetalol 200 milliGRAM(s) Oral three times a day  multivitamin 1 Tablet(s) Oral daily  NIFEdipine XL 90 milliGRAM(s) Oral daily  oxyCODONE  ER Tablet 10 milliGRAM(s) Oral every 12 hours  pentoxifylline 400 milliGRAM(s) Oral three times a day  piperacillin/tazobactam IVPB. 3.375 Gram(s) IV Intermittent every 12 hours  polyethylene glycol 3350 17 Gram(s) Oral daily  sodium chloride 0.9% lock flush 3 milliLiter(s) IV Push every 8 hours  sodium chloride 0.9%. 1000 milliLiter(s) (75 mL/Hr) IV Continuous <Continuous>  tamsulosin 0.4 milliGRAM(s) Oral at bedtime    MEDICATIONS  (PRN):  acetaminophen   Tablet. 650 milliGRAM(s) Oral every 6 hours PRN Moderate Pain (4 - 6)  benzocaine 15 mG/menthol 3.6 mG Lozenge 1 Lozenge Oral every 2 hours PRN Sore Throat  dextrose Gel 1 Dose(s) Oral once PRN Blood Glucose LESS THAN 70 milliGRAM(s)/deciliter  glucagon  Injectable 1 milliGRAM(s) IntraMuscular once PRN Glucose LESS THAN 70 milligrams/deciliter  oxyCODONE    5 mG/acetaminophen 325 mG 1 Tablet(s) Oral every 4 hours PRN Moderate Pain      T(C): 36.7 (04-09-18 @ 13:37), Max: 36.8 (18 @ 17:57)  HR: 72 (18 @ 13:37) (62 - 72)  BP: 134/63 (18 @ 13:37) (126/55 - 138/57)  RR: 16 (18 @ 13:37) (16 - 18)  SpO2: 98% (18 @ 13:37) (94% - 99%)  CAPILLARY BLOOD GLUCOSE      POCT Blood Glucose.: 174 mg/dL (2018 13:05)  POCT Blood Glucose.: 137 mg/dL (2018 09:01)  POCT Blood Glucose.: 151 mg/dL (2018 21:48)  POCT Blood Glucose.: 168 mg/dL (2018 18:12)    I&O's Summary    2018 07:  -  2018 07:00  --------------------------------------------------------  IN: 0 mL / OUT: 200 mL / NET: -200 mL    2018 07:  -  2018 13:49  --------------------------------------------------------  IN: 0 mL / OUT: 0 mL / NET: 0 mL        PHYSICAL EXAM:  GENERAL: NAD, well-developed  HEAD:  Atraumatic, Normocephalic  EYES: EOMI, PERRLA, conjunctiva and sclera clear  NECK: Supple, No JVD  CHEST/LUNG: Clear to auscultation bilaterally; No wheeze  HEART: s1 s2, regular rhythm and rate   ABDOMEN: Soft, Nontender, Nondistended; Bowel sounds present  EXTREMITIES:  2+ Peripheral Pulses, No clubbing, cyanosis, or edema  PSYCH: AAOx3, calm   NEUROLOGY: non-focal  SKIN: No rashes or lesions    LABS:                        8.2    8.67  )-----------( 292      ( 2018 06:15 )             25.7     04-09    138  |  102  |  74<H>  ----------------------------<  142<H>  4.7   |  17<L>  |  6.00<H>    Ca    8.1<L>      2018 06:15  Phos  6.3     04-  Mg     2.3     04-            Urinalysis Basic - ( 2018 12:03 )    Color: YELLOW / Appearance: TURBID / S.020 / pH: 5.5  Gluc: NEGATIVE / Ketone: NEGATIVE  / Bili: NEGATIVE / Urobili: NORMAL mg/dL   Blood: MODERATE / Protein: 100 mg/dL / Nitrite: NEGATIVE   Leuk Esterase: TRACE / RBC: 2-5 / WBC 25-50   Sq Epi: OCC / Non Sq Epi: x / Bacteria: FEW        RADIOLOGY & ADDITIONAL TESTS:    Imaging Personally Reviewed:    Consultant(s) Notes Reviewed:      Care Discussed with Consultants/Other Providers: Patient is a 63y old  Male who presents with a chief complaint of Left foot pain x 3 days. (2018 10:39)      SUBJECTIVE / OVERNIGHT EVENTS:  no event    MEDICATIONS  (STANDING):  aspirin enteric coated 81 milliGRAM(s) Oral daily  atorvastatin 80 milliGRAM(s) Oral at bedtime  calcium acetate 1334 milliGRAM(s) Oral three times a day with meals  collagenase Ointment 1 Application(s) Topical daily  cyanocobalamin 1000 MICROGram(s) Oral daily  Dakins Solution - 1/2 Strength 1 Application(s) Topical daily  dextrose 50% Injectable 25 Gram(s) IV Push once  dextrose 50% Injectable 25 Gram(s) IV Push once  famotidine    Tablet 20 milliGRAM(s) Oral daily  furosemide   Injectable 20 milliGRAM(s) IV Push once  gabapentin 100 milliGRAM(s) Oral daily  heparin  Injectable 5000 Unit(s) SubCutaneous every 8 hours  insulin lispro (HumaLOG) corrective regimen sliding scale   SubCutaneous three times a day before meals  labetalol 200 milliGRAM(s) Oral three times a day  multivitamin 1 Tablet(s) Oral daily  NIFEdipine XL 90 milliGRAM(s) Oral daily  oxyCODONE  ER Tablet 10 milliGRAM(s) Oral every 12 hours  pentoxifylline 400 milliGRAM(s) Oral three times a day  piperacillin/tazobactam IVPB. 3.375 Gram(s) IV Intermittent every 12 hours  polyethylene glycol 3350 17 Gram(s) Oral daily  sodium chloride 0.9% lock flush 3 milliLiter(s) IV Push every 8 hours  sodium chloride 0.9%. 1000 milliLiter(s) (75 mL/Hr) IV Continuous <Continuous>  tamsulosin 0.4 milliGRAM(s) Oral at bedtime    MEDICATIONS  (PRN):  acetaminophen   Tablet. 650 milliGRAM(s) Oral every 6 hours PRN Moderate Pain (4 - 6)  benzocaine 15 mG/menthol 3.6 mG Lozenge 1 Lozenge Oral every 2 hours PRN Sore Throat  dextrose Gel 1 Dose(s) Oral once PRN Blood Glucose LESS THAN 70 milliGRAM(s)/deciliter  glucagon  Injectable 1 milliGRAM(s) IntraMuscular once PRN Glucose LESS THAN 70 milligrams/deciliter  oxyCODONE    5 mG/acetaminophen 325 mG 1 Tablet(s) Oral every 4 hours PRN Moderate Pain      T(C): 36.7 (04-09-18 @ 13:37), Max: 36.8 (18 @ 17:57)  HR: 72 (18 @ 13:37) (62 - 72)  BP: 134/63 (18 @ 13:37) (126/55 - 138/57)  RR: 16 (18 @ 13:37) (16 - 18)  SpO2: 98% (18 @ 13:37) (94% - 99%)  CAPILLARY BLOOD GLUCOSE      POCT Blood Glucose.: 174 mg/dL (2018 13:05)  POCT Blood Glucose.: 137 mg/dL (2018 09:01)  POCT Blood Glucose.: 151 mg/dL (2018 21:48)  POCT Blood Glucose.: 168 mg/dL (2018 18:12)    I&O's Summary    2018 07:  -  2018 07:00  --------------------------------------------------------  IN: 0 mL / OUT: 200 mL / NET: -200 mL    2018 07:  -  2018 13:49  --------------------------------------------------------  IN: 0 mL / OUT: 0 mL / NET: 0 mL      PHYSICAL EXAM:  GENERAL: middle-aged man lying in bed in NAD  HEAD:  Atraumatic, Normocephalic  EYES: EOMI  NECK: Supple, No JVD  CHEST/LUNG: nonlabored breathing  HEART: nl S1/S2  ABDOMEN: nondistended, soft  EXT: Lt LE staples with bandage blood soaked RT BKA  PSYCH: alert, answering questions appropriately  NEUROLOGY: non-focal  SKIN: No rashes noted    LABS:                        8.2    8.67  )-----------( 292      ( 2018 06:15 )             25.7         138  |  102  |  74<H>  ----------------------------<  142<H>  4.7   |  17<L>  |  6.00<H>    Ca    8.1<L>      2018 06:15  Phos  6.3     04-  Mg     2.3     -            Urinalysis Basic - ( 2018 12:03 )    Color: YELLOW / Appearance: TURBID / S.020 / pH: 5.5  Gluc: NEGATIVE / Ketone: NEGATIVE  / Bili: NEGATIVE / Urobili: NORMAL mg/dL   Blood: MODERATE / Protein: 100 mg/dL / Nitrite: NEGATIVE   Leuk Esterase: TRACE / RBC: 2-5 / WBC 25-50   Sq Epi: OCC / Non Sq Epi: x / Bacteria: FEW        RADIOLOGY & ADDITIONAL TESTS:    Imaging Personally Reviewed:    Consultant(s) Notes Reviewed:      Care Discussed with Consultants/Other Providers: Patient is a 63y old  Male who presents with a chief complaint of Left foot pain x 3 days. (2018 10:39)      SUBJECTIVE / OVERNIGHT EVENTS:  no event    MEDICATIONS  (STANDING):  aspirin enteric coated 81 milliGRAM(s) Oral daily  atorvastatin 80 milliGRAM(s) Oral at bedtime  calcium acetate 1334 milliGRAM(s) Oral three times a day with meals  collagenase Ointment 1 Application(s) Topical daily  cyanocobalamin 1000 MICROGram(s) Oral daily  Dakins Solution - 1/2 Strength 1 Application(s) Topical daily  dextrose 50% Injectable 25 Gram(s) IV Push once  dextrose 50% Injectable 25 Gram(s) IV Push once  famotidine    Tablet 20 milliGRAM(s) Oral daily  furosemide   Injectable 20 milliGRAM(s) IV Push once  gabapentin 100 milliGRAM(s) Oral daily  heparin  Injectable 5000 Unit(s) SubCutaneous every 8 hours  insulin lispro (HumaLOG) corrective regimen sliding scale   SubCutaneous three times a day before meals  labetalol 200 milliGRAM(s) Oral three times a day  multivitamin 1 Tablet(s) Oral daily  NIFEdipine XL 90 milliGRAM(s) Oral daily  oxyCODONE  ER Tablet 10 milliGRAM(s) Oral every 12 hours  pentoxifylline 400 milliGRAM(s) Oral three times a day  piperacillin/tazobactam IVPB. 3.375 Gram(s) IV Intermittent every 12 hours  polyethylene glycol 3350 17 Gram(s) Oral daily  sodium chloride 0.9% lock flush 3 milliLiter(s) IV Push every 8 hours  sodium chloride 0.9%. 1000 milliLiter(s) (75 mL/Hr) IV Continuous <Continuous>  tamsulosin 0.4 milliGRAM(s) Oral at bedtime    MEDICATIONS  (PRN):  acetaminophen   Tablet. 650 milliGRAM(s) Oral every 6 hours PRN Moderate Pain (4 - 6)  benzocaine 15 mG/menthol 3.6 mG Lozenge 1 Lozenge Oral every 2 hours PRN Sore Throat  dextrose Gel 1 Dose(s) Oral once PRN Blood Glucose LESS THAN 70 milliGRAM(s)/deciliter  glucagon  Injectable 1 milliGRAM(s) IntraMuscular once PRN Glucose LESS THAN 70 milligrams/deciliter  oxyCODONE    5 mG/acetaminophen 325 mG 1 Tablet(s) Oral every 4 hours PRN Moderate Pain      T(C): 36.7 (04-09-18 @ 13:37), Max: 36.8 (18 @ 17:57)  HR: 72 (18 @ 13:37) (62 - 72)  BP: 134/63 (18 @ 13:37) (126/55 - 138/57)  RR: 16 (18 @ 13:37) (16 - 18)  SpO2: 98% (18 @ 13:37) (94% - 99%)  CAPILLARY BLOOD GLUCOSE      POCT Blood Glucose.: 174 mg/dL (2018 13:05)  POCT Blood Glucose.: 137 mg/dL (2018 09:01)  POCT Blood Glucose.: 151 mg/dL (2018 21:48)  POCT Blood Glucose.: 168 mg/dL (2018 18:12)    I&O's Summary    2018 07:  -  2018 07:00  --------------------------------------------------------  IN: 0 mL / OUT: 200 mL / NET: -200 mL    2018 07:  -  2018 13:49  --------------------------------------------------------  IN: 0 mL / OUT: 0 mL / NET: 0 mL      PHYSICAL EXAM:  GENERAL: middle-aged man lying in bed in NAD  HEAD:  Atraumatic, Normocephalic  EYES: EOMI  NECK: Supple, No JVD  CHEST/LUNG: nonlabored breathing  HEART: nl S1/S2  ABDOMEN: nondistended, soft  EXT: Lt LE staples with bandage, RT BKA  PSYCH: alert, answering questions appropriately  NEUROLOGY: non-focal  SKIN: No rashes noted      LABS:                        8.2    8.67  )-----------( 292      ( 2018 06:15 )             25.7         138  |  102  |  74<H>  ----------------------------<  142<H>  4.7   |  17<L>  |  6.00<H>    Ca    8.1<L>      2018 06:15  Phos  6.3     04-  Mg     2.3     -            Urinalysis Basic - ( 2018 12:03 )    Color: YELLOW / Appearance: TURBID / S.020 / pH: 5.5  Gluc: NEGATIVE / Ketone: NEGATIVE  / Bili: NEGATIVE / Urobili: NORMAL mg/dL   Blood: MODERATE / Protein: 100 mg/dL / Nitrite: NEGATIVE   Leuk Esterase: TRACE / RBC: 2-5 / WBC 25-50   Sq Epi: OCC / Non Sq Epi: x / Bacteria: FEW        RADIOLOGY & ADDITIONAL TESTS:    Imaging Personally Reviewed:    Consultant(s) Notes Reviewed:      Care Discussed with Consultants/Other Providers:

## 2018-04-09 NOTE — PROGRESS NOTE ADULT - ATTENDING COMMENTS
Cr has trended up slightly further, 6.0 today. Reports feeling well. I/O are not being documented accurately, patient reports good urine output. No new meds identified. BP adequately controlled, no hypotensive episodes. Renal sono negative for hydro. Appetite ok. He did receive pRBCs over the weekend.     On exam, he appears hypovolemic.     Cr peaked at 6.3 during this admission, then plateaued around 5 now 6.0.    Elevated Cr mostly a component of advanced CKD in the setting of uncontrolled DM and HTN. There is a small component of JANY on CKD which is likely secondary to hemodynamic injury leading to pre-renal azotemia +/- ATN. Other possibilities include embolic phenomenon given recent vascular procedure vs AIN (both less likely). There is currently also a component of pre-renal azotemia due to hypovolemia.     1.) Hold lasix for the next 24-48 hours.  2.) Give NS at 75 cc/hr for a total of 1-2 liters x 24 hours.  3.) Repeat UA, urine lytes, urine eosinophils, complements. Check Vit D, PTH.  4.) Start Ca acetate 2 tabs PO TID for hyperphos.  5.) Low K, low phos renal diet.  6.) Avoid nephrotoxins.   7.) Save non-dominant arm for HD access as patient will likely require initiation of RRT some time in the near future.

## 2018-04-09 NOTE — PROGRESS NOTE ADULT - SUBJECTIVE AND OBJECTIVE BOX
[facility] DIVISION OF KIDNEY DISEASES AND HYPERTENSION -- FOLLOW UP NOTE  --------------------------------------------------------------------------------  García Blanchardahim     --------------------------------------------------------------------------------  Chief Complaint: JANY on CKD    24 hour events/subjective:  no acute events.     PAST HISTORY  --------------------------------------------------------------------------------  No significant changes to PMH, PSH, FHx, SHx, unless otherwise noted    ALLERGIES & MEDICATIONS  --------------------------------------------------------------------------------  Allergies    No Known Allergies    Intolerances      Standing Inpatient Medications  aspirin enteric coated 81 milliGRAM(s) Oral daily  atorvastatin 80 milliGRAM(s) Oral at bedtime  collagenase Ointment 1 Application(s) Topical daily  cyanocobalamin 1000 MICROGram(s) Oral daily  Dakins Solution - 1/2 Strength 1 Application(s) Topical daily  dextrose 50% Injectable 25 Gram(s) IV Push once  dextrose 50% Injectable 25 Gram(s) IV Push once  famotidine    Tablet 20 milliGRAM(s) Oral daily  furosemide    Tablet 60 milliGRAM(s) Oral daily  furosemide   Injectable 20 milliGRAM(s) IV Push once  gabapentin 100 milliGRAM(s) Oral daily  heparin  Injectable 5000 Unit(s) SubCutaneous every 8 hours  insulin lispro (HumaLOG) corrective regimen sliding scale   SubCutaneous three times a day before meals  labetalol 200 milliGRAM(s) Oral three times a day  multivitamin 1 Tablet(s) Oral daily  NIFEdipine XL 90 milliGRAM(s) Oral daily  oxyCODONE  ER Tablet 10 milliGRAM(s) Oral every 12 hours  pentoxifylline 400 milliGRAM(s) Oral three times a day  piperacillin/tazobactam IVPB. 3.375 Gram(s) IV Intermittent every 12 hours  polyethylene glycol 3350 17 Gram(s) Oral daily  sodium chloride 0.9% lock flush 3 milliLiter(s) IV Push every 8 hours  tamsulosin 0.4 milliGRAM(s) Oral at bedtime    PRN Inpatient Medications  acetaminophen   Tablet. 650 milliGRAM(s) Oral every 6 hours PRN  benzocaine 15 mG/menthol 3.6 mG Lozenge 1 Lozenge Oral every 2 hours PRN  dextrose Gel 1 Dose(s) Oral once PRN  glucagon  Injectable 1 milliGRAM(s) IntraMuscular once PRN  oxyCODONE    5 mG/acetaminophen 325 mG 1 Tablet(s) Oral every 4 hours PRN      REVIEW OF SYSTEMS  --------------------------------------------------------------------------------  Review Of Systems:  Constitutional: [ ] Fever [ ] Chills [ ] Fatigue [ ] Weight change   HEENT: [ ] Blurred vision [ ] Eye Pain [ ] Headache [ ] Runny nose [ ] Sore Throat   Respiratory: [ ] Cough [ ] Wheezing [ ] Shortness of breath  Cardiovascular: [ ] Chest Pain [ ] Palpitations [ ] MENSAH [ ] PND [ ] Orthopnea  Gastrointestinal: [ ] Abdominal Pain [ ] Diarrhea [ ] Constipation [ ] Hemorrhoids [ ] Nausea [ ] Vomiting  Genitourinary: [ ] Nocturia [ ] Dysuria [ ] Incontinence  Extremities: [ ] Swelling [ ] Joint Pain  Neurologic: [ ] Focal deficit [ ] Paresthesias [ ] Syncope  Lymphatic: [ ] Swelling [ ] Lymphadenopathy   Skin: [ ] Rash [ ] Ecchymoses [ ] Wounds [ ] Lesions  Psychiatry: [ ] Depression [ ] Suicidal/Homicidal Ideation [ ] Anxiety [ ] Sleep Disturbances  [ x] 10 point review of systems is otherwise negative except as mentioned above       [ ]Unable to obtain    All other systems were reviewed and are negative, except as noted.    VITALS/PHYSICAL EXAM  --------------------------------------------------------------------------------  T(C): 36.4 (18 @ 05:16), Max: 36.8 (18 @ 17:57)  HR: 71 (18 @ 05:16) (64 - 71)  BP: 133/61 (18 @ 05:16) (126/55 - 151/76)  RR: 16 (18 @ 05:16) (16 - 20)  SpO2: 97% (18 @ 05:16) (94% - 100%)  Wt(kg): --      Daily     Daily Weight in k.7 (2018 01:49)  I&O's Summary    2018 07:01  -  2018 07:00  --------------------------------------------------------  IN: 0 mL / OUT: 200 mL / NET: -200 mL          18 @ 07:01  -  18 @ 07:00  --------------------------------------------------------  IN: 0 mL / OUT: 200 mL / NET: -200 mL        Physical Exam:              Gen: NAD   	HEENT: anicteric  	Pulm: CTA B/L   	CV: RRR  	Back: No dependent edema  	Abd: soft, nontender, nondistended  	: No cervantes  	LE: Warm, R BKA, LLE 3+ edema  	Neuro: no asterixis  	Skin: Warm, without rashes  	Vascular access: none    LABS/STUDIES  --------------------------------------------------------------------------------              8.2    8.67  >-----------<  292      [18 06:15]              25.7     138  |  102  |  74  ----------------------------<  142      [18 06:15]  4.7   |  17  |  6.00        Ca     8.1     [18 06:15]      Mg     2.3     [18 06:15]      Phos  6.3     [18 06:15]      Creatinine Trend:  SCr 6.00 [:15]  SCr 5.36 [ 07:23]  SCr 4.90 [ 05:00]  SCr 4.61 [ 06:30]  SCr 4.49 [ 12:22]    Urinalysis - [18 @ 12:03]      Color YELLOW / Appearance TURBID / SG 1.020 / pH 5.5      Gluc NEGATIVE / Ketone NEGATIVE  / Bili NEGATIVE / Urobili NORMAL       Blood MODERATE / Protein 100 / Leuk Est TRACE / Nitrite NEGATIVE      RBC 2-5 / WBC 25-50 / Hyaline  / Gran  / Sq Epi OCC / Non Sq Epi  / Bacteria FEW      Iron 21, TIBC 163, %sat --      [18 @ 05:00]  Ferritin 540.7      [18 05:00]  HbA1c 5.9      [18 @ 07:11]  TSH 3.22      [18 @ 06:00]          Radiology  --------------------------------------------------------------------------------    --------------------------------------------------------------------------------  García Moreno

## 2018-04-09 NOTE — PROGRESS NOTE ADULT - SUBJECTIVE AND OBJECTIVE BOX
C Team Vascular Surgery Progress Note (p [id number])    SUBJECTIVE:  The patient was seen and examined this morning during rounds.    OBJECTIVE:     ** VITAL SIGNS / I&O's **  T(C): 36.4 (04-09-18 @ 05:16), Max: 36.8 (04-08-18 @ 17:57)  HR: 71 (04-09-18 @ 05:16) (64 - 71)  BP: 133/61 (04-09-18 @ 05:16) (126/55 - 151/76)  RR: 16 (04-09-18 @ 05:16) (16 - 20)  SpO2: 97% (04-09-18 @ 05:16) (94% - 100%)  Wt(kg): --  04-08 @ 07:01  -  04-09 @ 07:00  --------------------------------------------------------  IN:  Total IN: 0 mL    OUT:    Voided: 200 mL  Total OUT: 200 mL    Total NET: -200 mL      ** PHYSICAL EXAM **    PE:  Gen: Alert, NAD  Pulm: Airway patient, unlabored respirations   Ext: L ext dressing c/d/i, s/p R BKA, L palpable PT pulse, L DP signal, incision stable    ** LABS **                          8.2    8.67  )-----------( 292      ( 09 Apr 2018 06:15 )             25.7     04-09    138  |  102  |  74<H>  ----------------------------<  142<H>  4.7   |  17<L>  |  6.00<H>    Ca    8.1<L>      09 Apr 2018 06:15  Phos  6.3     04-09  Mg     2.3     04-09          RADIOLOGY, EKG & ADDITIONAL TESTS: Reviewed.         CAPILLARY BLOOD GLUCOSE      POCT Blood Glucose.: 151 mg/dL (08 Apr 2018 21:48)  POCT Blood Glucose.: 168 mg/dL (08 Apr 2018 18:12)  POCT Blood Glucose.: 219 mg/dL (08 Apr 2018 13:04)  POCT Blood Glucose.: 127 mg/dL (08 Apr 2018 09:14)    MEDICATIONS  (STANDING):  aspirin enteric coated 81 milliGRAM(s) Oral daily  atorvastatin 80 milliGRAM(s) Oral at bedtime  collagenase Ointment 1 Application(s) Topical daily  cyanocobalamin 1000 MICROGram(s) Oral daily  Dakins Solution - 1/2 Strength 1 Application(s) Topical daily  dextrose 50% Injectable 25 Gram(s) IV Push once  dextrose 50% Injectable 25 Gram(s) IV Push once  famotidine    Tablet 20 milliGRAM(s) Oral daily  furosemide    Tablet 60 milliGRAM(s) Oral daily  furosemide   Injectable 20 milliGRAM(s) IV Push once  gabapentin 100 milliGRAM(s) Oral daily  heparin  Injectable 5000 Unit(s) SubCutaneous every 8 hours  insulin lispro (HumaLOG) corrective regimen sliding scale   SubCutaneous three times a day before meals  labetalol 200 milliGRAM(s) Oral three times a day  multivitamin 1 Tablet(s) Oral daily  NIFEdipine XL 90 milliGRAM(s) Oral daily  oxyCODONE  ER Tablet 10 milliGRAM(s) Oral every 12 hours  pentoxifylline 400 milliGRAM(s) Oral three times a day  piperacillin/tazobactam IVPB. 3.375 Gram(s) IV Intermittent every 12 hours  polyethylene glycol 3350 17 Gram(s) Oral daily  sodium chloride 0.9% lock flush 3 milliLiter(s) IV Push every 8 hours  tamsulosin 0.4 milliGRAM(s) Oral at bedtime    MEDICATIONS  (PRN):  acetaminophen   Tablet. 650 milliGRAM(s) Oral every 6 hours PRN Moderate Pain (4 - 6)  benzocaine 15 mG/menthol 3.6 mG Lozenge 1 Lozenge Oral every 2 hours PRN Sore Throat  dextrose Gel 1 Dose(s) Oral once PRN Blood Glucose LESS THAN 70 milliGRAM(s)/deciliter  glucagon  Injectable 1 milliGRAM(s) IntraMuscular once PRN Glucose LESS THAN 70 milligrams/deciliter  oxyCODONE    5 mG/acetaminophen 325 mG 1 Tablet(s) Oral every 4 hours PRN Moderate Pain

## 2018-04-09 NOTE — PROGRESS NOTE ADULT - PROBLEM SELECTOR PLAN 1
Pt. with JANY on CKD initally in the setting of infection and diarrhea. CKD in the setting of long-standing DM. Scr was 1.5 in 3/2017, increased to 2.30 in 7/2017. Pt. also underwent vascular study/left leg angiogram on 3/8. Scr on admission (2/12) was elevated at 3.81.  peaked to 6.3 on 2/23.  Patient again with JANY On ckd. Cr started to rise from 4.07 to 4.41 from 3/30-31. And then cr stayed stable between 4.5-5.  Cr increased from 4.49 on 4/5 to 4.6 on 4/6 and has been steadily rising since. weight stable. Patient received 1 unit prbc on 4/6 and 4/7 for low hemoglobin. Gabapentin 200 mg was also start on 4/6 (now at 100 mg)  check cbc with diff,   would check PVR and document bladder volume despite cervantes placement.     Pt. non-oliguric. Monitor BMP and urine output. Avoid any potential nephrotoxins Pt. with JANY on CKD initally in the setting of infection and diarrhea. CKD in the setting of long-standing DM. Scr was 1.5 in 3/2017, increased to 2.30 in 7/2017. Pt. also underwent vascular study/left leg angiogram on 3/8. Scr on admission (2/12) was elevated at 3.81.  peaked to 6.3 on 2/23.  Patient again with JANY On ckd. Cr started to rise from 4.07 to 4.41 from 3/30-31. And then cr stayed stable between 4.5-5.  Cr increased from 4.49 on 4/5 to 4.6 on 4/6 and has been steadily rising since. weight stable. Patient received 1 unit prbc on 4/6 and 4/7 for low hemoglobin. Gabapentin 200 mg was also start on 4/6 (now at 100 mg)  Of note Uop has also decreased from 1200 on 4/6 to 600 cc to now 300 cc  check cbc with diff,   would check PVR and document bladder volume despite cervantes placement.   Pt. oliguric. Monitor BMP and urine output and daily weights. Avoid any potential nephrotoxins Pt. with JANY on CKD initally in the setting of infection and diarrhea. CKD in the setting of long-standing DM. Scr was 1.5 in 3/2017, increased to 2.30 in 7/2017. Pt. also underwent vascular study/left leg angiogram on 3/8. Scr on admission (2/12) was elevated at 3.81.  peaked to 6.3 on 2/23.  Patient again with JANY On ckd. Cr started to rise from 4.07 to 4.41 from 3/30-31. And then cr stayed stable between 4.5-5.  Cr increased from 4.49 on 4/5 to 4.6 on 4/6 and has been steadily rising since. weight stable. Patient received 1 unit prbc on 4/6 and 4/7 for low hemoglobin. Gabapentin 200 mg was also start on 4/6 (now at 100 mg)  Of note Uop has also decreased from 1200 on 4/6 to 600 cc to now 300 cc  check cbc with diff,   would check PVR and document bladder volume despite cervantes placement.   check c3, c4 for thromboembolic phenomenon  Pt. oliguric. Monitor BMP and urine output and daily weights. Avoid any potential nephrotoxins

## 2018-04-09 NOTE — PROGRESS NOTE ADULT - ASSESSMENT
63y Male s/p left femoral to below knee popliteal artery bypass with left RSVG, jump graft from distal anastomosis to left PT using remaining RSV. Creatinine uptrending    -renal US, urine electrolytes  - F/u nephrology recommendations   - Reg diet  - Strict I/O's  - Patient with likely anemia of chronic disease,  - DVT ppx  - IV antibiotics until discharge   - Discharge planning to rehab, pending authorization     [id number]

## 2018-04-10 LAB
24R-OH-CALCIDIOL SERPL-MCNC: 18.1 NG/ML — LOW (ref 30–80)
APPEARANCE UR: SIGNIFICANT CHANGE UP
BILIRUB UR-MCNC: NEGATIVE — SIGNIFICANT CHANGE UP
BLOOD UR QL VISUAL: HIGH
BUN SERPL-MCNC: 82 MG/DL — HIGH (ref 7–23)
C3 SERPL-MCNC: 156 MG/DL — SIGNIFICANT CHANGE UP (ref 80–180)
C4 SERPL-MCNC: 50 MG/DL — HIGH (ref 10–45)
CALCIUM SERPL-MCNC: 7.7 MG/DL — LOW (ref 8.4–10.5)
CHLORIDE SERPL-SCNC: 102 MMOL/L — SIGNIFICANT CHANGE UP (ref 98–107)
CHLORIDE UR-SCNC: 10 MMOL/L — SIGNIFICANT CHANGE UP
CO2 SERPL-SCNC: 16 MMOL/L — LOW (ref 22–31)
COLOR SPEC: YELLOW — SIGNIFICANT CHANGE UP
CREAT SERPL-MCNC: 6.37 MG/DL — HIGH (ref 0.5–1.3)
GLUCOSE BLDC GLUCOMTR-MCNC: 144 MG/DL — HIGH (ref 70–99)
GLUCOSE BLDC GLUCOMTR-MCNC: 163 MG/DL — HIGH (ref 70–99)
GLUCOSE BLDC GLUCOMTR-MCNC: 164 MG/DL — HIGH (ref 70–99)
GLUCOSE BLDC GLUCOMTR-MCNC: 181 MG/DL — HIGH (ref 70–99)
GLUCOSE SERPL-MCNC: 158 MG/DL — HIGH (ref 70–99)
GLUCOSE UR-MCNC: NEGATIVE — SIGNIFICANT CHANGE UP
HCT VFR BLD CALC: 25.6 % — LOW (ref 39–50)
HGB BLD-MCNC: 8.3 G/DL — LOW (ref 13–17)
KETONES UR-MCNC: NEGATIVE — SIGNIFICANT CHANGE UP
LEUKOCYTE ESTERASE UR-ACNC: NEGATIVE — SIGNIFICANT CHANGE UP
MAGNESIUM SERPL-MCNC: 2.3 MG/DL — SIGNIFICANT CHANGE UP (ref 1.6–2.6)
MCHC RBC-ENTMCNC: 28.1 PG — SIGNIFICANT CHANGE UP (ref 27–34)
MCHC RBC-ENTMCNC: 32.4 % — SIGNIFICANT CHANGE UP (ref 32–36)
MCV RBC AUTO: 86.8 FL — SIGNIFICANT CHANGE UP (ref 80–100)
MUCOUS THREADS # UR AUTO: SIGNIFICANT CHANGE UP
NITRITE UR-MCNC: NEGATIVE — SIGNIFICANT CHANGE UP
NRBC # FLD: 0 — SIGNIFICANT CHANGE UP
PH UR: 5.5 — SIGNIFICANT CHANGE UP (ref 4.6–8)
PHOSPHATE SERPL-MCNC: 6.4 MG/DL — HIGH (ref 2.5–4.5)
PLATELET # BLD AUTO: 310 K/UL — SIGNIFICANT CHANGE UP (ref 150–400)
PMV BLD: 11.3 FL — SIGNIFICANT CHANGE UP (ref 7–13)
POTASSIUM SERPL-MCNC: 4.8 MMOL/L — SIGNIFICANT CHANGE UP (ref 3.5–5.3)
POTASSIUM SERPL-SCNC: 4.8 MMOL/L — SIGNIFICANT CHANGE UP (ref 3.5–5.3)
POTASSIUM UR-SCNC: 41.2 MMOL/L — SIGNIFICANT CHANGE UP
PROT UR-MCNC: 100 MG/DL — SIGNIFICANT CHANGE UP
PTH-INTACT SERPL-MCNC: 165.7 PG/ML — HIGH (ref 15–65)
RBC # BLD: 2.95 M/UL — LOW (ref 4.2–5.8)
RBC # FLD: 15 % — HIGH (ref 10.3–14.5)
RBC CASTS # UR COMP ASSIST: SIGNIFICANT CHANGE UP (ref 0–?)
SODIUM SERPL-SCNC: 137 MMOL/L — SIGNIFICANT CHANGE UP (ref 135–145)
SODIUM UR-SCNC: 21 MMOL/L — SIGNIFICANT CHANGE UP
SP GR SPEC: 1.02 — SIGNIFICANT CHANGE UP (ref 1–1.04)
UROBILINOGEN FLD QL: NORMAL MG/DL — SIGNIFICANT CHANGE UP
WBC # BLD: 8.6 K/UL — SIGNIFICANT CHANGE UP (ref 3.8–10.5)
WBC # FLD AUTO: 8.6 K/UL — SIGNIFICANT CHANGE UP (ref 3.8–10.5)
WBC UR QL: SIGNIFICANT CHANGE UP (ref 0–?)

## 2018-04-10 PROCEDURE — 99233 SBSQ HOSP IP/OBS HIGH 50: CPT

## 2018-04-10 RX ADMIN — OXYCODONE AND ACETAMINOPHEN 1 TABLET(S): 5; 325 TABLET ORAL at 12:53

## 2018-04-10 RX ADMIN — Medication 81 MILLIGRAM(S): at 12:02

## 2018-04-10 RX ADMIN — FAMOTIDINE 20 MILLIGRAM(S): 10 INJECTION INTRAVENOUS at 12:02

## 2018-04-10 RX ADMIN — Medication 90 MILLIGRAM(S): at 06:01

## 2018-04-10 RX ADMIN — OXYCODONE HYDROCHLORIDE 10 MILLIGRAM(S): 5 TABLET ORAL at 06:07

## 2018-04-10 RX ADMIN — Medication 2: at 13:38

## 2018-04-10 RX ADMIN — GABAPENTIN 100 MILLIGRAM(S): 400 CAPSULE ORAL at 12:02

## 2018-04-10 RX ADMIN — ATORVASTATIN CALCIUM 80 MILLIGRAM(S): 80 TABLET, FILM COATED ORAL at 21:20

## 2018-04-10 RX ADMIN — Medication 400 MILLIGRAM(S): at 13:37

## 2018-04-10 RX ADMIN — OXYCODONE HYDROCHLORIDE 10 MILLIGRAM(S): 5 TABLET ORAL at 18:57

## 2018-04-10 RX ADMIN — TAMSULOSIN HYDROCHLORIDE 0.4 MILLIGRAM(S): 0.4 CAPSULE ORAL at 21:20

## 2018-04-10 RX ADMIN — OXYCODONE AND ACETAMINOPHEN 1 TABLET(S): 5; 325 TABLET ORAL at 12:02

## 2018-04-10 RX ADMIN — OXYCODONE HYDROCHLORIDE 10 MILLIGRAM(S): 5 TABLET ORAL at 18:19

## 2018-04-10 RX ADMIN — Medication 1334 MILLIGRAM(S): at 10:35

## 2018-04-10 RX ADMIN — Medication 1 TABLET(S): at 12:02

## 2018-04-10 RX ADMIN — Medication 200 MILLIGRAM(S): at 06:02

## 2018-04-10 RX ADMIN — HEPARIN SODIUM 5000 UNIT(S): 5000 INJECTION INTRAVENOUS; SUBCUTANEOUS at 06:01

## 2018-04-10 RX ADMIN — PREGABALIN 1000 MICROGRAM(S): 225 CAPSULE ORAL at 12:02

## 2018-04-10 RX ADMIN — Medication 200 MILLIGRAM(S): at 21:20

## 2018-04-10 RX ADMIN — Medication 2: at 18:19

## 2018-04-10 RX ADMIN — Medication 200 MILLIGRAM(S): at 13:38

## 2018-04-10 RX ADMIN — HEPARIN SODIUM 5000 UNIT(S): 5000 INJECTION INTRAVENOUS; SUBCUTANEOUS at 21:20

## 2018-04-10 RX ADMIN — OXYCODONE HYDROCHLORIDE 10 MILLIGRAM(S): 5 TABLET ORAL at 06:05

## 2018-04-10 RX ADMIN — Medication 1334 MILLIGRAM(S): at 18:18

## 2018-04-10 RX ADMIN — Medication 400 MILLIGRAM(S): at 21:20

## 2018-04-10 RX ADMIN — Medication 400 MILLIGRAM(S): at 06:02

## 2018-04-10 RX ADMIN — HEPARIN SODIUM 5000 UNIT(S): 5000 INJECTION INTRAVENOUS; SUBCUTANEOUS at 13:37

## 2018-04-10 RX ADMIN — Medication 1334 MILLIGRAM(S): at 13:37

## 2018-04-10 NOTE — PROGRESS NOTE ADULT - ASSESSMENT
- pt seen and eval  - sugicel applied to wound to slow bleeding  Cont collagenase and Dakins daily  -Will treat conservatively, although he still may require a more extensive debridement or even a TMA in the future especially if it gets infected, no plans for surgery during this admission  -I would recommend course of abx as outpt for 10 days   -pt can follow up in wound care center  and perhaps try HBO as well  Podiatrically stable for DC

## 2018-04-10 NOTE — PROGRESS NOTE ADULT - SUBJECTIVE AND OBJECTIVE BOX
Patient is a 63y old  Male who presents with a chief complaint of Left foot pain x 3 days. (19 Feb 2018 10:39)      SUBJECTIVE / OVERNIGHT EVENTS:  no event, no complaints.     MEDICATIONS  (STANDING):  aspirin enteric coated 81 milliGRAM(s) Oral daily  atorvastatin 80 milliGRAM(s) Oral at bedtime  calcium acetate 1334 milliGRAM(s) Oral three times a day with meals  collagenase Ointment 1 Application(s) Topical daily  cyanocobalamin 1000 MICROGram(s) Oral daily  Dakins Solution - 1/2 Strength 1 Application(s) Topical daily  dextrose 50% Injectable 25 Gram(s) IV Push once  dextrose 50% Injectable 25 Gram(s) IV Push once  famotidine    Tablet 20 milliGRAM(s) Oral daily  furosemide   Injectable 20 milliGRAM(s) IV Push once  gabapentin 100 milliGRAM(s) Oral daily  heparin  Injectable 5000 Unit(s) SubCutaneous every 8 hours  insulin lispro (HumaLOG) corrective regimen sliding scale   SubCutaneous three times a day before meals  labetalol 200 milliGRAM(s) Oral three times a day  multivitamin 1 Tablet(s) Oral daily  NIFEdipine XL 90 milliGRAM(s) Oral daily  oxyCODONE  ER Tablet 10 milliGRAM(s) Oral every 12 hours  pentoxifylline 400 milliGRAM(s) Oral three times a day  polyethylene glycol 3350 17 Gram(s) Oral daily  silver nitrate Applicator 1 Application(s) Topical once  sodium chloride 0.9%. 1000 milliLiter(s) (75 mL/Hr) IV Continuous <Continuous>  tamsulosin 0.4 milliGRAM(s) Oral at bedtime    MEDICATIONS  (PRN):  acetaminophen   Tablet. 650 milliGRAM(s) Oral every 6 hours PRN Moderate Pain (4 - 6)  dextrose Gel 1 Dose(s) Oral once PRN Blood Glucose LESS THAN 70 milliGRAM(s)/deciliter  glucagon  Injectable 1 milliGRAM(s) IntraMuscular once PRN Glucose LESS THAN 70 milligrams/deciliter  oxyCODONE    5 mG/acetaminophen 325 mG 1 Tablet(s) Oral every 4 hours PRN Moderate Pain      T(C): 36.9 (04-10-18 @ 13:35), Max: 36.9 (04-10-18 @ 05:29)  HR: 67 (04-10-18 @ 13:35) (61 - 69)  BP: 131/60 (04-10-18 @ 13:35) (124/49 - 141/57)  RR: 16 (04-10-18 @ 13:35) (16 - 18)  SpO2: 99% (04-10-18 @ 13:35) (93% - 99%)  CAPILLARY BLOOD GLUCOSE      POCT Blood Glucose.: 164 mg/dL (10 Apr 2018 13:18)  POCT Blood Glucose.: 144 mg/dL (10 Apr 2018 09:15)  POCT Blood Glucose.: 123 mg/dL (09 Apr 2018 20:53)  POCT Blood Glucose.: 188 mg/dL (09 Apr 2018 17:57)    I&O's Summary    09 Apr 2018 07:01  -  10 Apr 2018 07:00  --------------------------------------------------------  IN: 450 mL / OUT: 400 mL / NET: 50 mL    10 Apr 2018 07:01  -  10 Apr 2018 15:13  --------------------------------------------------------  IN: 450 mL / OUT: 0 mL / NET: 450 mL        PHYSICAL EXAM:  GENERAL: NAD, well-developed  HEAD:  Atraumatic, Normocephalic  EYES: EOMI, PERRLA, conjunctiva and sclera clear  NECK: Supple, No JVD  CHEST/LUNG: Clear to auscultation bilaterally; No wheeze  HEART: s1 s2, regular rhythm and rate   ABDOMEN: Soft, Nontender, Nondistended; Bowel sounds present  EXTREMITIES:  2+ Peripheral Pulses, No clubbing, cyanosis, or edema  PSYCH: AAOx3, calm   NEUROLOGY: non-focal  SKIN: No rashes or lesions    LABS:                        8.3    8.60  )-----------( 310      ( 10 Apr 2018 10:57 )             25.6     04-10    137  |  102  |  82<H>  ----------------------------<  158<H>  4.8   |  16<L>  |  6.37<H>    Ca    7.7<L>      10 Apr 2018 06:10  Phos  6.4     04-10  Mg     2.3     04-10                RADIOLOGY & ADDITIONAL TESTS:    Imaging Personally Reviewed:    Consultant(s) Notes Reviewed:      Care Discussed with Consultants/Other Providers: Patient is a 63y old  Male who presents with a chief complaint of Left foot pain x 3 days. (19 Feb 2018 10:39)      SUBJECTIVE / OVERNIGHT EVENTS:  no event, no complaints.     MEDICATIONS  (STANDING):  aspirin enteric coated 81 milliGRAM(s) Oral daily  atorvastatin 80 milliGRAM(s) Oral at bedtime  calcium acetate 1334 milliGRAM(s) Oral three times a day with meals  collagenase Ointment 1 Application(s) Topical daily  cyanocobalamin 1000 MICROGram(s) Oral daily  Dakins Solution - 1/2 Strength 1 Application(s) Topical daily  dextrose 50% Injectable 25 Gram(s) IV Push once  dextrose 50% Injectable 25 Gram(s) IV Push once  famotidine    Tablet 20 milliGRAM(s) Oral daily  furosemide   Injectable 20 milliGRAM(s) IV Push once  gabapentin 100 milliGRAM(s) Oral daily  heparin  Injectable 5000 Unit(s) SubCutaneous every 8 hours  insulin lispro (HumaLOG) corrective regimen sliding scale   SubCutaneous three times a day before meals  labetalol 200 milliGRAM(s) Oral three times a day  multivitamin 1 Tablet(s) Oral daily  NIFEdipine XL 90 milliGRAM(s) Oral daily  oxyCODONE  ER Tablet 10 milliGRAM(s) Oral every 12 hours  pentoxifylline 400 milliGRAM(s) Oral three times a day  polyethylene glycol 3350 17 Gram(s) Oral daily  silver nitrate Applicator 1 Application(s) Topical once  sodium chloride 0.9%. 1000 milliLiter(s) (75 mL/Hr) IV Continuous <Continuous>  tamsulosin 0.4 milliGRAM(s) Oral at bedtime    MEDICATIONS  (PRN):  acetaminophen   Tablet. 650 milliGRAM(s) Oral every 6 hours PRN Moderate Pain (4 - 6)  dextrose Gel 1 Dose(s) Oral once PRN Blood Glucose LESS THAN 70 milliGRAM(s)/deciliter  glucagon  Injectable 1 milliGRAM(s) IntraMuscular once PRN Glucose LESS THAN 70 milligrams/deciliter  oxyCODONE    5 mG/acetaminophen 325 mG 1 Tablet(s) Oral every 4 hours PRN Moderate Pain      T(C): 36.9 (04-10-18 @ 13:35), Max: 36.9 (04-10-18 @ 05:29)  HR: 67 (04-10-18 @ 13:35) (61 - 69)  BP: 131/60 (04-10-18 @ 13:35) (124/49 - 141/57)  RR: 16 (04-10-18 @ 13:35) (16 - 18)  SpO2: 99% (04-10-18 @ 13:35) (93% - 99%)  CAPILLARY BLOOD GLUCOSE      POCT Blood Glucose.: 164 mg/dL (10 Apr 2018 13:18)  POCT Blood Glucose.: 144 mg/dL (10 Apr 2018 09:15)  POCT Blood Glucose.: 123 mg/dL (09 Apr 2018 20:53)  POCT Blood Glucose.: 188 mg/dL (09 Apr 2018 17:57)    I&O's Summary    09 Apr 2018 07:01  -  10 Apr 2018 07:00  --------------------------------------------------------  IN: 450 mL / OUT: 400 mL / NET: 50 mL    10 Apr 2018 07:01  -  10 Apr 2018 15:13  --------------------------------------------------------  IN: 450 mL / OUT: 0 mL / NET: 450 mL        PHYSICAL EXAM:  GENERAL: middle-aged man lying in bed in NAD  HEAD:  Atraumatic, Normocephalic  EYES: EOMI  NECK: Supple, No JVD  CHEST/LUNG: nonlabored breathing  HEART: nl S1/S2  ABDOMEN: nondistended, soft  EXT: Lt LE staples with bandage, RT BKA  PSYCH: alert, answering questions appropriately  NEUROLOGY: non-focal  SKIN: No rashes noted    LABS:                        8.3    8.60  )-----------( 310      ( 10 Apr 2018 10:57 )             25.6     04-10    137  |  102  |  82<H>  ----------------------------<  158<H>  4.8   |  16<L>  |  6.37<H>    Ca    7.7<L>      10 Apr 2018 06:10  Phos  6.4     04-10  Mg     2.3     04-10                RADIOLOGY & ADDITIONAL TESTS:    Imaging Personally Reviewed:    Consultant(s) Notes Reviewed:      Care Discussed with Consultants/Other Providers:

## 2018-04-10 NOTE — PROGRESS NOTE ADULT - SUBJECTIVE AND OBJECTIVE BOX
Vascular Surgery     SUBJECTIVE: Patient seen and examined at bedside, patient without complaints.     Vital Signs Last 24 Hrs  T(C): 36.4 (10 Apr 2018 10:00), Max: 36.9 (10 Apr 2018 05:29)  T(F): 97.6 (10 Apr 2018 10:00), Max: 98.5 (10 Apr 2018 05:29)  HR: 61 (10 Apr 2018 10:00) (61 - 72)  BP: 136/62 (10 Apr 2018 10:00) (124/49 - 141/57)  BP(mean): --  RR: 17 (10 Apr 2018 10:00) (16 - 18)  SpO2: 94% (10 Apr 2018 10:00) (93% - 98%)  I&O's Detail    2018 07:01  -  10 Apr 2018 07:00  --------------------------------------------------------  IN:    sodium chloride 0.9%.: 450 mL  Total IN: 450 mL    OUT:    Voided: 400 mL  Total OUT: 400 mL    Total NET: 50 mL        MEDICATIONS  (STANDING):  aspirin enteric coated 81 milliGRAM(s) Oral daily  atorvastatin 80 milliGRAM(s) Oral at bedtime  calcium acetate 1334 milliGRAM(s) Oral three times a day with meals  collagenase Ointment 1 Application(s) Topical daily  cyanocobalamin 1000 MICROGram(s) Oral daily  Dakins Solution - 1/2 Strength 1 Application(s) Topical daily  dextrose 50% Injectable 25 Gram(s) IV Push once  dextrose 50% Injectable 25 Gram(s) IV Push once  famotidine    Tablet 20 milliGRAM(s) Oral daily  furosemide   Injectable 20 milliGRAM(s) IV Push once  gabapentin 100 milliGRAM(s) Oral daily  heparin  Injectable 5000 Unit(s) SubCutaneous every 8 hours  insulin lispro (HumaLOG) corrective regimen sliding scale   SubCutaneous three times a day before meals  labetalol 200 milliGRAM(s) Oral three times a day  multivitamin 1 Tablet(s) Oral daily  NIFEdipine XL 90 milliGRAM(s) Oral daily  oxyCODONE  ER Tablet 10 milliGRAM(s) Oral every 12 hours  pentoxifylline 400 milliGRAM(s) Oral three times a day  polyethylene glycol 3350 17 Gram(s) Oral daily  sodium chloride 0.9%. 1000 milliLiter(s) (75 mL/Hr) IV Continuous <Continuous>  tamsulosin 0.4 milliGRAM(s) Oral at bedtime    MEDICATIONS  (PRN):  acetaminophen   Tablet. 650 milliGRAM(s) Oral every 6 hours PRN Moderate Pain (4 - 6)  dextrose Gel 1 Dose(s) Oral once PRN Blood Glucose LESS THAN 70 milliGRAM(s)/deciliter  glucagon  Injectable 1 milliGRAM(s) IntraMuscular once PRN Glucose LESS THAN 70 milligrams/deciliter  oxyCODONE    5 mG/acetaminophen 325 mG 1 Tablet(s) Oral every 4 hours PRN Moderate Pain    PE:  Gen: Alert, NAD  Pulm: Airway patient, unlabored respirations   Ext: L ext dressing c/d/i, s/p R BKA, L palpable PT pulse, L DP signal, incision stable    LABS:                        8.2    8.67  )-----------( 292      ( 2018 06:15 )             25.7     04-10    137  |  102  |  82<H>  ----------------------------<  158<H>  4.8   |  16<L>  |  6.37<H>    Ca    7.7<L>      10 Apr 2018 06:10  Phos  6.4     04-10  Mg     2.3     04-10        Urinalysis Basic - ( 2018 12:03 )    Color: YELLOW / Appearance: TURBID / S.020 / pH: 5.5  Gluc: NEGATIVE / Ketone: NEGATIVE  / Bili: NEGATIVE / Urobili: NORMAL mg/dL   Blood: MODERATE / Protein: 100 mg/dL / Nitrite: NEGATIVE   Leuk Esterase: TRACE / RBC: 2-5 / WBC 25-50   Sq Epi: OCC / Non Sq Epi: x / Bacteria: FEW    63y Male s/p left femoral to below knee popliteal artery bypass with left RSVG, jump graft from distal anastomosis to left PT using remaining RSV. Creatinine uptrending      - F/u nephrology recommendations   - Reg diet  - Strict I/O's  - Patient with likely anemia of chronic disease,  - DVT ppx  - Discharge planning to rehab, pending authorization

## 2018-04-10 NOTE — PROGRESS NOTE ADULT - SUBJECTIVE AND OBJECTIVE BOX
Patient is a 63y old  Male who presents with a chief complaint of Left foot pain x 3 days. (2018 10:39)       INTERVAL HPI/OVERNIGHT EVENTS:  Patient seen and evaluated at bedside.  Pt is resting comfortable in NAD. Denies N/V/F/C.  Pain controlled    Allergies    No Known Allergies    Intolerances        Vital Signs Last 24 Hrs  T(C): 36.9 (10 Apr 2018 05:29), Max: 36.9 (10 Apr 2018 05:29)  T(F): 98.5 (10 Apr 2018 05:29), Max: 98.5 (10 Apr 2018 05:29)  HR: 69 (10 Apr 2018 05:29) (62 - 72)  BP: 141/57 (10 Apr 2018 05:29) (124/49 - 141/57)  BP(mean): --  RR: 17 (10 Apr 2018 05:29) (16 - 18)  SpO2: 96% (10 Apr 2018 05:29) (93% - 98%)    LABS:                        8.2    8.67  )-----------( 292      ( 2018 06:15 )             25.7     04-10    137  |  102  |  82<H>  ----------------------------<  158<H>  4.8   |  16<L>  |  6.37<H>    Ca    7.7<L>      10 Apr 2018 06:10  Phos  6.4     04-10  Mg     2.3     04-10        Urinalysis Basic - ( 2018 12:03 )    Color: YELLOW / Appearance: TURBID / S.020 / pH: 5.5  Gluc: NEGATIVE / Ketone: NEGATIVE  / Bili: NEGATIVE / Urobili: NORMAL mg/dL   Blood: MODERATE / Protein: 100 mg/dL / Nitrite: NEGATIVE   Leuk Esterase: TRACE / RBC: 2-5 / WBC 25-50   Sq Epi: OCC / Non Sq Epi: x / Bacteria: FEW      CAPILLARY BLOOD GLUCOSE      POCT Blood Glucose.: 123 mg/dL (2018 20:53)  POCT Blood Glucose.: 188 mg/dL (2018 17:57)  POCT Blood Glucose.: 174 mg/dL (2018 13:05)  POCT Blood Glucose.: 137 mg/dL (2018 09:01)      Lower Extremity Physical Exam:  plantar foot wound dry and stable, well adhered.  toe 3-5 eschars peeled off with red healthy tissue underneath, mild oozing from plantar foot, no active bleeding, no purulence, no signs of infection    RADIOLOGY & ADDITIONAL TESTS:

## 2018-04-11 LAB
BUN SERPL-MCNC: 81 MG/DL — HIGH (ref 7–23)
CALCIUM SERPL-MCNC: 8.2 MG/DL — LOW (ref 8.4–10.5)
CHLORIDE SERPL-SCNC: 104 MMOL/L — SIGNIFICANT CHANGE UP (ref 98–107)
CO2 SERPL-SCNC: 18 MMOL/L — LOW (ref 22–31)
CREAT SERPL-MCNC: 6.5 MG/DL — HIGH (ref 0.5–1.3)
EOSINOPHIL NFR URNS MANUAL: SIGNIFICANT CHANGE UP (ref 0–0)
GLUCOSE BLDC GLUCOMTR-MCNC: 116 MG/DL — HIGH (ref 70–99)
GLUCOSE BLDC GLUCOMTR-MCNC: 136 MG/DL — HIGH (ref 70–99)
GLUCOSE BLDC GLUCOMTR-MCNC: 153 MG/DL — HIGH (ref 70–99)
GLUCOSE BLDC GLUCOMTR-MCNC: 181 MG/DL — HIGH (ref 70–99)
GLUCOSE SERPL-MCNC: 119 MG/DL — HIGH (ref 70–99)
HCT VFR BLD CALC: 24.5 % — LOW (ref 39–50)
HGB BLD-MCNC: 7.9 G/DL — LOW (ref 13–17)
MAGNESIUM SERPL-MCNC: 2.4 MG/DL — SIGNIFICANT CHANGE UP (ref 1.6–2.6)
MCHC RBC-ENTMCNC: 27.9 PG — SIGNIFICANT CHANGE UP (ref 27–34)
MCHC RBC-ENTMCNC: 32.2 % — SIGNIFICANT CHANGE UP (ref 32–36)
MCV RBC AUTO: 86.6 FL — SIGNIFICANT CHANGE UP (ref 80–100)
NRBC # FLD: 0 — SIGNIFICANT CHANGE UP
PHOSPHATE SERPL-MCNC: 6.2 MG/DL — HIGH (ref 2.5–4.5)
PLATELET # BLD AUTO: 297 K/UL — SIGNIFICANT CHANGE UP (ref 150–400)
PMV BLD: 11.5 FL — SIGNIFICANT CHANGE UP (ref 7–13)
POTASSIUM SERPL-MCNC: 4.8 MMOL/L — SIGNIFICANT CHANGE UP (ref 3.5–5.3)
POTASSIUM SERPL-SCNC: 4.8 MMOL/L — SIGNIFICANT CHANGE UP (ref 3.5–5.3)
RBC # BLD: 2.83 M/UL — LOW (ref 4.2–5.8)
RBC # FLD: 15 % — HIGH (ref 10.3–14.5)
SODIUM SERPL-SCNC: 139 MMOL/L — SIGNIFICANT CHANGE UP (ref 135–145)
UUN UR-MCNC: 582.6 MG/DL — SIGNIFICANT CHANGE UP
WBC # BLD: 9.2 K/UL — SIGNIFICANT CHANGE UP (ref 3.8–10.5)
WBC # FLD AUTO: 9.2 K/UL — SIGNIFICANT CHANGE UP (ref 3.8–10.5)

## 2018-04-11 PROCEDURE — 99232 SBSQ HOSP IP/OBS MODERATE 35: CPT

## 2018-04-11 PROCEDURE — 99233 SBSQ HOSP IP/OBS HIGH 50: CPT

## 2018-04-11 RX ORDER — ERGOCALCIFEROL 1.25 MG/1
50000 CAPSULE ORAL
Refills: 0 | Status: DISCONTINUED | OUTPATIENT
Start: 2018-04-11 | End: 2018-04-13

## 2018-04-11 RX ORDER — CALCIUM ACETATE 667 MG
2001 TABLET ORAL
Refills: 0 | Status: DISCONTINUED | OUTPATIENT
Start: 2018-04-11 | End: 2018-04-13

## 2018-04-11 RX ORDER — FUROSEMIDE 40 MG
40 TABLET ORAL
Refills: 0 | Status: DISCONTINUED | OUTPATIENT
Start: 2018-04-11 | End: 2018-04-13

## 2018-04-11 RX ADMIN — ERGOCALCIFEROL 50000 UNIT(S): 1.25 CAPSULE ORAL at 19:19

## 2018-04-11 RX ADMIN — FAMOTIDINE 20 MILLIGRAM(S): 10 INJECTION INTRAVENOUS at 12:48

## 2018-04-11 RX ADMIN — OXYCODONE HYDROCHLORIDE 10 MILLIGRAM(S): 5 TABLET ORAL at 19:17

## 2018-04-11 RX ADMIN — Medication 1 APPLICATION(S): at 18:51

## 2018-04-11 RX ADMIN — Medication 400 MILLIGRAM(S): at 06:35

## 2018-04-11 RX ADMIN — Medication 2: at 13:21

## 2018-04-11 RX ADMIN — OXYCODONE AND ACETAMINOPHEN 1 TABLET(S): 5; 325 TABLET ORAL at 13:30

## 2018-04-11 RX ADMIN — Medication 400 MILLIGRAM(S): at 22:03

## 2018-04-11 RX ADMIN — Medication 81 MILLIGRAM(S): at 12:47

## 2018-04-11 RX ADMIN — Medication 1 TABLET(S): at 12:47

## 2018-04-11 RX ADMIN — PREGABALIN 1000 MICROGRAM(S): 225 CAPSULE ORAL at 12:47

## 2018-04-11 RX ADMIN — Medication 200 MILLIGRAM(S): at 06:35

## 2018-04-11 RX ADMIN — Medication 40 MILLIGRAM(S): at 19:21

## 2018-04-11 RX ADMIN — Medication 400 MILLIGRAM(S): at 13:22

## 2018-04-11 RX ADMIN — Medication 200 MILLIGRAM(S): at 13:22

## 2018-04-11 RX ADMIN — Medication 1 APPLICATION(S): at 18:50

## 2018-04-11 RX ADMIN — Medication 90 MILLIGRAM(S): at 06:36

## 2018-04-11 RX ADMIN — HEPARIN SODIUM 5000 UNIT(S): 5000 INJECTION INTRAVENOUS; SUBCUTANEOUS at 22:03

## 2018-04-11 RX ADMIN — Medication 2: at 19:19

## 2018-04-11 RX ADMIN — Medication 2001 MILLIGRAM(S): at 19:17

## 2018-04-11 RX ADMIN — TAMSULOSIN HYDROCHLORIDE 0.4 MILLIGRAM(S): 0.4 CAPSULE ORAL at 22:03

## 2018-04-11 RX ADMIN — OXYCODONE AND ACETAMINOPHEN 1 TABLET(S): 5; 325 TABLET ORAL at 12:43

## 2018-04-11 RX ADMIN — OXYCODONE HYDROCHLORIDE 10 MILLIGRAM(S): 5 TABLET ORAL at 06:34

## 2018-04-11 RX ADMIN — HEPARIN SODIUM 5000 UNIT(S): 5000 INJECTION INTRAVENOUS; SUBCUTANEOUS at 06:35

## 2018-04-11 RX ADMIN — OXYCODONE HYDROCHLORIDE 10 MILLIGRAM(S): 5 TABLET ORAL at 07:35

## 2018-04-11 RX ADMIN — Medication 200 MILLIGRAM(S): at 22:04

## 2018-04-11 RX ADMIN — HEPARIN SODIUM 5000 UNIT(S): 5000 INJECTION INTRAVENOUS; SUBCUTANEOUS at 13:21

## 2018-04-11 RX ADMIN — ATORVASTATIN CALCIUM 80 MILLIGRAM(S): 80 TABLET, FILM COATED ORAL at 22:03

## 2018-04-11 RX ADMIN — Medication 2001 MILLIGRAM(S): at 12:48

## 2018-04-11 NOTE — PROGRESS NOTE ADULT - ASSESSMENT
63 y.o. Male w/ hx DM, HTN, CKD stage 4, PVD s/p Rt BKA in 2009 p/w sepsis 2/2 cellulitis and LLE SFA disease was to have Lt fem-pop bypass but delayed due to acute hypoxic respiratory failure 2/2 fluid overload from acute diastolic CHF. Acute diastolic HF now resolved after bumex gtt now s/p  left femoral to below knee popliteal artery bypass with left RSVG, jump graft from distal anastomosis to left PT using remaining RSVG on 3/29    *Peripheral arterial disease  - s/p angiogram with multi vessel disease s/p LE dopplers & vein mapping  KENN .71 on left.   - s/p Femoral-popliteal bypass 3/29  - management as per vascular  - LLE improved with gabapentin and opiates  - c/w ASA and statin    *Anemia: possible acute blood loss anemia in setting of recent surgery and CKD. Likely from recent debridement and chronic oozing from LT leg debridement No melena or visible blood loss- per discussion with vascular team, stool was hemoccult negative  - monitor hgb, stable   -transfuse as needed maintain h/H above 7/21      *Acute respiratory failure with hypoxia, resolved   - s/p bumex gtt x3 days       *ATN (acute tubular necrosis)  - renal board  - Creatinine worsening CKD IV in the setting of ATN from sepsis & contrast induced nephropathy   - renal US negative for hydronephrosis  - avoid nephrotoxins  Lasix 40 bid per renal     *Wound infection  - Left foot with cellulitis with resulting sepsis now resolved.  - S/p vancomycin on zosyn currently off antibiotics as per vascular    *Hypertension, unspecified type  - BP acceptable  - c/w Nifedipine and labetalol    *Type 2 diabetes mellitus with complication, without long-term current use of insulin  - Pt on oral medications at home. Well controlled A1c-5.9  - c/w ISS while inpatient, will need to adjust oral meds upon dc given new baseline renal function    *BPH: cont tamsulosin

## 2018-04-11 NOTE — PROGRESS NOTE ADULT - ATTENDING COMMENTS
Patient seen and examined. Doing well. Ortega in place, patient is non-oliguric. No n/v, appetite is good. Cr relatively stable suggesting a plateau at this point. Lytes stable, acid-base status at goal. Patient has some dependent edema, resp status stable. UA without RBCs or protein, complements normal. BP adequately controlled.     JANY on advanced CKD likely secondary to ATN without evidence of renal recovery  AGMA secondary to above  Hyperphos secondary to above/renal osteodystrophy    Patient's chances of renal recovery are low this point in which case this may turn into CKD 5. Patient's electrolyte, acid-base and volume status is relatively stable at this point and he has no uremic symptoms. As a result, RRT initiation is not indicated at this point in time. However, patient is at high risk for requiring initiation of RRT in the near future. For now, we will have to manage this as an advanced CKD.    1.) Start lasix 40 mg PO BID, can be up-titrated as needed.  2.) Increase Ca acetate to 3 tabs PO TID, please ensure that this is given with/prior to the meals.  3.) No indication for PO bicarb at this point.  4.) Start ergocalciferol 50,000 units PO Q weekly x 8 weeks total. PTH is at goal.  5.) Save non-dominant arm for HD access placement in the near future.  6.) Clear for discharge from a renal standpoint however, patient will require close (monthly) outpatient renal f/u.

## 2018-04-11 NOTE — PROGRESS NOTE ADULT - SUBJECTIVE AND OBJECTIVE BOX
Patient is a 63y old  Male who presents with a chief complaint of Left foot pain x 3 days. (2018 10:39)      SUBJECTIVE / OVERNIGHT EVENTS:  No event    MEDICATIONS  (STANDING):  aspirin enteric coated 81 milliGRAM(s) Oral daily  atorvastatin 80 milliGRAM(s) Oral at bedtime  calcium acetate 2001 milliGRAM(s) Oral three times a day with meals  collagenase Ointment 1 Application(s) Topical daily  cyanocobalamin 1000 MICROGram(s) Oral daily  Dakins Solution - 1/2 Strength 1 Application(s) Topical daily  dextrose 50% Injectable 25 Gram(s) IV Push once  dextrose 50% Injectable 25 Gram(s) IV Push once  ergocalciferol 68998 Unit(s) Oral every week  famotidine    Tablet 20 milliGRAM(s) Oral daily  furosemide    Tablet 40 milliGRAM(s) Oral two times a day  heparin  Injectable 5000 Unit(s) SubCutaneous every 8 hours  insulin lispro (HumaLOG) corrective regimen sliding scale   SubCutaneous three times a day before meals  labetalol 200 milliGRAM(s) Oral three times a day  multivitamin 1 Tablet(s) Oral daily  NIFEdipine XL 90 milliGRAM(s) Oral daily  oxyCODONE  ER Tablet 10 milliGRAM(s) Oral every 12 hours  pentoxifylline 400 milliGRAM(s) Oral three times a day  polyethylene glycol 3350 17 Gram(s) Oral daily  silver nitrate Applicator 1 Application(s) Topical once  sodium chloride 0.9%. 1000 milliLiter(s) (75 mL/Hr) IV Continuous <Continuous>  tamsulosin 0.4 milliGRAM(s) Oral at bedtime    MEDICATIONS  (PRN):  acetaminophen   Tablet. 650 milliGRAM(s) Oral every 6 hours PRN Moderate Pain (4 - 6)  dextrose Gel 1 Dose(s) Oral once PRN Blood Glucose LESS THAN 70 milliGRAM(s)/deciliter  glucagon  Injectable 1 milliGRAM(s) IntraMuscular once PRN Glucose LESS THAN 70 milligrams/deciliter  oxyCODONE    5 mG/acetaminophen 325 mG 1 Tablet(s) Oral every 4 hours PRN Moderate Pain      T(C): 36.7 (18 @ 09:41), Max: 36.8 (04-10-18 @ 21:04)  HR: 63 (18 @ 09:41) (61 - 69)  BP: 147/75 (18 @ 09:41) (128/54 - 147/75)  RR: 17 (18 @ 09:41) (16 - 18)  SpO2: 96% (18 @ 09:41) (95% - 96%)  CAPILLARY BLOOD GLUCOSE      POCT Blood Glucose.: 181 mg/dL (2018 13:05)  POCT Blood Glucose.: 116 mg/dL (2018 09:38)  POCT Blood Glucose.: 163 mg/dL (10 Apr 2018 22:20)  POCT Blood Glucose.: 181 mg/dL (10 Apr 2018 17:59)    I&O's Summary    10 Apr 2018 07:  -  2018 07:00  --------------------------------------------------------  IN: 450 mL / OUT: 825 mL / NET: -375 mL    2018 07:01  -  2018 13:55  --------------------------------------------------------  IN: 0 mL / OUT: 250 mL / NET: -250 mL        PHYSICAL EXAM:  GENERAL: NAD, well-developed  HEAD:  Atraumatic, Normocephalic  EYES: EOMI, PERRLA, conjunctiva and sclera clear  NECK: Supple, No JVD  CHEST/LUNG: Clear to auscultation bilaterally; No wheeze  HEART: s1 s2, regular rhythm and rate   ABDOMEN: Soft, Nontender, Nondistended; Bowel sounds present  EXTREMITIES:  2+ Peripheral Pulses, No clubbing, cyanosis, or edema  PSYCH: AAOx3, calm   NEUROLOGY: non-focal  SKIN: No rashes or lesions    LABS:                        7.9    9.20  )-----------( 297      ( 2018 07:15 )             24.5     04-11    139  |  104  |  81<H>  ----------------------------<  119<H>  4.8   |  18<L>  |  6.50<H>    Ca    8.2<L>      2018 07:15  Phos  6.2     04-  Mg     2.4     04-11            Urinalysis Basic - ( 10 Apr 2018 16:00 )    Color: YELLOW / Appearance: HAZY / S.021 / pH: 5.5  Gluc: NEGATIVE / Ketone: NEGATIVE  / Bili: NEGATIVE / Urobili: NORMAL mg/dL   Blood: TRACE / Protein: 100 mg/dL / Nitrite: NEGATIVE   Leuk Esterase: NEGATIVE / RBC: 2-5 / WBC 10-25   Sq Epi: x / Non Sq Epi: x / Bacteria: x        RADIOLOGY & ADDITIONAL TESTS:    Imaging Personally Reviewed:    Consultant(s) Notes Reviewed:      Care Discussed with Consultants/Other Providers: Patient is a 63y old  Male who presents with a chief complaint of Left foot pain x 3 days. (2018 10:39)      SUBJECTIVE / OVERNIGHT EVENTS:  No event    MEDICATIONS  (STANDING):  aspirin enteric coated 81 milliGRAM(s) Oral daily  atorvastatin 80 milliGRAM(s) Oral at bedtime  calcium acetate 2001 milliGRAM(s) Oral three times a day with meals  collagenase Ointment 1 Application(s) Topical daily  cyanocobalamin 1000 MICROGram(s) Oral daily  Dakins Solution - 1/2 Strength 1 Application(s) Topical daily  dextrose 50% Injectable 25 Gram(s) IV Push once  dextrose 50% Injectable 25 Gram(s) IV Push once  ergocalciferol 01607 Unit(s) Oral every week  famotidine    Tablet 20 milliGRAM(s) Oral daily  furosemide    Tablet 40 milliGRAM(s) Oral two times a day  heparin  Injectable 5000 Unit(s) SubCutaneous every 8 hours  insulin lispro (HumaLOG) corrective regimen sliding scale   SubCutaneous three times a day before meals  labetalol 200 milliGRAM(s) Oral three times a day  multivitamin 1 Tablet(s) Oral daily  NIFEdipine XL 90 milliGRAM(s) Oral daily  oxyCODONE  ER Tablet 10 milliGRAM(s) Oral every 12 hours  pentoxifylline 400 milliGRAM(s) Oral three times a day  polyethylene glycol 3350 17 Gram(s) Oral daily  silver nitrate Applicator 1 Application(s) Topical once  sodium chloride 0.9%. 1000 milliLiter(s) (75 mL/Hr) IV Continuous <Continuous>  tamsulosin 0.4 milliGRAM(s) Oral at bedtime    MEDICATIONS  (PRN):  acetaminophen   Tablet. 650 milliGRAM(s) Oral every 6 hours PRN Moderate Pain (4 - 6)  dextrose Gel 1 Dose(s) Oral once PRN Blood Glucose LESS THAN 70 milliGRAM(s)/deciliter  glucagon  Injectable 1 milliGRAM(s) IntraMuscular once PRN Glucose LESS THAN 70 milligrams/deciliter  oxyCODONE    5 mG/acetaminophen 325 mG 1 Tablet(s) Oral every 4 hours PRN Moderate Pain      T(C): 36.7 (18 @ 09:41), Max: 36.8 (04-10-18 @ 21:04)  HR: 63 (18 @ 09:41) (61 - 69)  BP: 147/75 (18 @ 09:41) (128/54 - 147/75)  RR: 17 (18 @ 09:41) (16 - 18)  SpO2: 96% (18 @ 09:41) (95% - 96%)  CAPILLARY BLOOD GLUCOSE      POCT Blood Glucose.: 181 mg/dL (2018 13:05)  POCT Blood Glucose.: 116 mg/dL (2018 09:38)  POCT Blood Glucose.: 163 mg/dL (10 Apr 2018 22:20)  POCT Blood Glucose.: 181 mg/dL (10 Apr 2018 17:59)    I&O's Summary    10 Apr 2018 07:  -  2018 07:00  --------------------------------------------------------  IN: 450 mL / OUT: 825 mL / NET: -375 mL    2018 07:01  -  2018 13:55  --------------------------------------------------------  IN: 0 mL / OUT: 250 mL / NET: -250 mL        PHYSICAL EXAM:  GENERAL: in bed in NAD  HEAD:  Atraumatic, Normocephalic  EYES: EOMI  NECK: Supple, No JVD  CHEST/LUNG: nonlabored breathing  HEART: nl S1/S2  ABDOMEN: nondistended, soft  EXT: Lt LE staples with bandage, RT BKA  PSYCH: alert, answering questions appropriately  NEUROLOGY: non-focal  SKIN: No rashes noted    LABS:                        7.9    9.20  )-----------( 297      ( 2018 07:15 )             24.5     -    139  |  104  |  81<H>  ----------------------------<  119<H>  4.8   |  18<L>  |  6.50<H>    Ca    8.2<L>      2018 07:15  Phos  6.2     04-11  Mg     2.4     04-11            Urinalysis Basic - ( 10 Apr 2018 16:00 )    Color: YELLOW / Appearance: HAZY / S.021 / pH: 5.5  Gluc: NEGATIVE / Ketone: NEGATIVE  / Bili: NEGATIVE / Urobili: NORMAL mg/dL   Blood: TRACE / Protein: 100 mg/dL / Nitrite: NEGATIVE   Leuk Esterase: NEGATIVE / RBC: 2-5 / WBC 10-25   Sq Epi: x / Non Sq Epi: x / Bacteria: x        RADIOLOGY & ADDITIONAL TESTS:    Imaging Personally Reviewed:    Consultant(s) Notes Reviewed:      Care Discussed with Consultants/Other Providers:

## 2018-04-11 NOTE — CHART NOTE - NSCHARTNOTEFT_GEN_A_CORE
Source: Patient [x ]    Family [ ]     other [x ] chart review    Pt seen for length of stay follow up. Hospital events noted. Pt currently with resolved L foot wound infection.     Current Diet : Consistent carb renal w/ evening snack. Dysphagia 2 Mechanical soft, thin liquids. Nepro 2x daily.   Reported:  No GI distress (nausea/vomiting/diarrhea/constipation.) No swallowing difficulties with current diet.   PO intake:  < 50% [ ] 50-75% [ x]   % [ ]  other : Patient reports drinking Nepro 1x/day.   Current Weight: (2/14): 165.5 pounds (3/17): 171.9 pounds (3/23): 147.7 pounds (4/11): 150.7 pounds. Weight difference possibly due to measurement error as seen in discrepancy in March weights.     __________________ Pertinent Medications__________________   MEDICATIONS  (STANDING):  aspirin enteric coated 81 milliGRAM(s) Oral daily  atorvastatin 80 milliGRAM(s) Oral at bedtime  calcium acetate 2001 milliGRAM(s) Oral three times a day with meals  collagenase Ointment 1 Application(s) Topical daily  cyanocobalamin 1000 MICROGram(s) Oral daily  Dakins Solution - 1/2 Strength 1 Application(s) Topical daily  dextrose 50% Injectable 25 Gram(s) IV Push once  dextrose 50% Injectable 25 Gram(s) IV Push once  ergocalciferol 42838 Unit(s) Oral every week  famotidine    Tablet 20 milliGRAM(s) Oral daily  furosemide    Tablet 40 milliGRAM(s) Oral two times a day  heparin  Injectable 5000 Unit(s) SubCutaneous every 8 hours  insulin lispro (HumaLOG) corrective regimen sliding scale   SubCutaneous three times a day before meals  labetalol 200 milliGRAM(s) Oral three times a day  multivitamin 1 Tablet(s) Oral daily  NIFEdipine XL 90 milliGRAM(s) Oral daily  oxyCODONE  ER Tablet 10 milliGRAM(s) Oral every 12 hours  pentoxifylline 400 milliGRAM(s) Oral three times a day  polyethylene glycol 3350 17 Gram(s) Oral daily  silver nitrate Applicator 1 Application(s) Topical once  sodium chloride 0.9%. 1000 milliLiter(s) (75 mL/Hr) IV Continuous <Continuous>  tamsulosin 0.4 milliGRAM(s) Oral at bedtime    MEDICATIONS  (PRN):  acetaminophen   Tablet. 650 milliGRAM(s) Oral every 6 hours PRN Moderate Pain (4 - 6)  dextrose Gel 1 Dose(s) Oral once PRN Blood Glucose LESS THAN 70 milliGRAM(s)/deciliter  glucagon  Injectable 1 milliGRAM(s) IntraMuscular once PRN Glucose LESS THAN 70 milligrams/deciliter  oxyCODONE    5 mG/acetaminophen 325 mG 1 Tablet(s) Oral every 4 hours PRN Moderate Pain      __________________ Pertinent Labs__________________   04-11 Na139 mmol/L Glu 119 mg/dL<H> K+ 4.8 mmol/L Cr  6.50 mg/dL<H> BUN 81 mg/dL<H> 04-11 Phos 6.2 mg/dL<H>        Skin: 1+ LLE edema, L leg incision    Estimated Needs:   [ x] no change since previous assessment    Previous Nutrition Diagnosis:    [x ] Increased Nutrient Needs     Nutrition Diagnosis is [x ] ongoing  [ ] resolved [ ] not applicable     New Nutrition Diagnosis: [ x] not applicable        Interventions:     1. Consistent carb + renal with evening snack; mechanical soft thin liquid consistency. Continue Nepro 2x daily.   2. Encourage PO intake and honor food preferences as able.      Monitoring and Evaluation:     [x ] PO intake [x ] Tolerance to diet prescription [ x] weights [ x] follow up per protocol  [ ] other:

## 2018-04-11 NOTE — PROGRESS NOTE ADULT - ASSESSMENT
63y Male s/p left femoral to below knee popliteal artery bypass with left RSVG, jump graft from distal anastomosis to left PT using remaining RSV. Creatinine uptrending:  - Reg diet  - Pain control  - Strict I/O's  - Ortega in place  - F/u nephrology recommendations; holding gabapentin for now; will f/u labs, cbc w diff  - Trend Cr  - Will d/c every other staple from L leg incision site  - DVT ppx

## 2018-04-11 NOTE — STUDENT SIGN OFF DOCUMENT - STUDENT DOCUMENT REVIEW
Faustina Cox, Dietetic Intern
Maribel Villegas
Radha Calles, SPT
Zev Suh NCC, SN
Alfredo argueta
Mara grove

## 2018-04-11 NOTE — STUDENT SIGN OFF DOCUMENT - DOCUMENTS STUDENTS ARE SIGNED OFF ON
Vital Signs/Input and Output
Vital Signs/Input and Output
Vital Signs
chart note-nutrition
Plan of Care/Assessment and Intervention
Vital Signs
medication administration given with instructor Biju LARA NCC/Vital Signs
Vital Signs

## 2018-04-11 NOTE — PROGRESS NOTE ADULT - PROBLEM SELECTOR PLAN 1
Pt. with JANY on CKD initally in the setting of infection and diarrhea. CKD in the setting of long-standing DM. Scr was 1.5 in 3/2017, increased to 2.30 in 7/2017. Pt. also underwent vascular study/left leg angiogram on 3/8. Scr on admission (2/12) was elevated at 3.81.  peaked to 6.3 on 2/23.  Patient again with JANY On ckd. Cr started to rise from 4.07 to 4.41 from 3/30-31. And then cr stayed stable between 4.5-5.  Cr increased from 4.49 on 4/5 to 4.6 on 4/6 and has been steadily rising since. weight stable. Patient received 1 unit prbc on 4/6 and 4/7 for low hemoglobin. (ischemic jany?) Gabapentin 200 mg was also start on 4/6 (now at 100 mg)  Of note Uop has also decreased from 1200 on 4/6 to 600 cc to now 300 cc  *---Please check cbc with diff,   would check PVR and document bladder volume despite cervantes placement.   c3, c4 wnl (for thromboembolic phenomenon)  Pt. non oligouric at this time. Monitor BMP and urine output and daily weights. Avoid any potential nephrotoxins  Echo showed mod pulm htn with normal RV and LV function.   Would hold gabapentin at this time.  US renal noted. B/l pleural effusion patient also has low albumin.   Cr rising despite NS at 75 cc for 24 hours.

## 2018-04-11 NOTE — PROGRESS NOTE ADULT - SUBJECTIVE AND OBJECTIVE BOX
[facility] DIVISION OF KIDNEY DISEASES AND HYPERTENSION -- FOLLOW UP NOTE  --------------------------------------------------------------------------------  García Blanchardahim     --------------------------------------------------------------------------------  Chief Complaint:    24 hour events/subjective:  no new complaints.  Cr rising despite cervantes. Non oligouric.       PAST HISTORY  --------------------------------------------------------------------------------  No significant changes to PMH, PSH, FHx, SHx, unless otherwise noted    ALLERGIES & MEDICATIONS  --------------------------------------------------------------------------------  Allergies    No Known Allergies    Intolerances      Standing Inpatient Medications  aspirin enteric coated 81 milliGRAM(s) Oral daily  atorvastatin 80 milliGRAM(s) Oral at bedtime  calcium acetate 1334 milliGRAM(s) Oral three times a day with meals  collagenase Ointment 1 Application(s) Topical daily  cyanocobalamin 1000 MICROGram(s) Oral daily  Dakins Solution - 1/2 Strength 1 Application(s) Topical daily  dextrose 50% Injectable 25 Gram(s) IV Push once  dextrose 50% Injectable 25 Gram(s) IV Push once  famotidine    Tablet 20 milliGRAM(s) Oral daily  furosemide   Injectable 20 milliGRAM(s) IV Push once  gabapentin 100 milliGRAM(s) Oral daily  heparin  Injectable 5000 Unit(s) SubCutaneous every 8 hours  insulin lispro (HumaLOG) corrective regimen sliding scale   SubCutaneous three times a day before meals  labetalol 200 milliGRAM(s) Oral three times a day  multivitamin 1 Tablet(s) Oral daily  NIFEdipine XL 90 milliGRAM(s) Oral daily  oxyCODONE  ER Tablet 10 milliGRAM(s) Oral every 12 hours  pentoxifylline 400 milliGRAM(s) Oral three times a day  polyethylene glycol 3350 17 Gram(s) Oral daily  silver nitrate Applicator 1 Application(s) Topical once  sodium chloride 0.9%. 1000 milliLiter(s) IV Continuous <Continuous>  tamsulosin 0.4 milliGRAM(s) Oral at bedtime    PRN Inpatient Medications  acetaminophen   Tablet. 650 milliGRAM(s) Oral every 6 hours PRN  dextrose Gel 1 Dose(s) Oral once PRN  glucagon  Injectable 1 milliGRAM(s) IntraMuscular once PRN  oxyCODONE    5 mG/acetaminophen 325 mG 1 Tablet(s) Oral every 4 hours PRN      REVIEW OF SYSTEMS  --------------------------------------------------------------------------------  Review Of Systems:  Constitutional: [ ] Fever [ ] Chills [ ] Fatigue [ ] Weight change   HEENT: [ ] Blurred vision [ ] Eye Pain [ ] Headache [ ] Runny nose [ ] Sore Throat   Respiratory: [ ] Cough [ ] Wheezing [ ] Shortness of breath  Cardiovascular: [ ] Chest Pain [ ] Palpitations [ ] MENSAH [ ] PND [ ] Orthopnea  Gastrointestinal: [ ] Abdominal Pain [ ] Diarrhea [ ] Constipation [ ] Hemorrhoids [ ] Nausea [ ] Vomiting  Genitourinary: [ ] Nocturia [ ] Dysuria [ ] Incontinence  Extremities: [ ] Swelling [ ] Joint Pain  Neurologic: [ ] Focal deficit [ ] Paresthesias [ ] Syncope  Lymphatic: [ ] Swelling [ ] Lymphadenopathy   Skin: [ ] Rash [ ] Ecchymoses [ ] Wounds [ ] Lesions  Psychiatry: [ ] Depression [ ] Suicidal/Homicidal Ideation [ ] Anxiety [ ] Sleep Disturbances  [x ] 10 point review of systems is otherwise negative except as mentioned above       [ ]Unable to obtain    All other systems were reviewed and are negative, except as noted.    VITALS/PHYSICAL EXAM  --------------------------------------------------------------------------------  T(C): 36.6 (18 @ 05:03), Max: 36.9 (04-10-18 @ 13:35)  HR: 69 (18 @ 05:03) (61 - 69)  BP: 142/57 (18 @ 05:03) (128/54 - 143/60)  RR: 16 (18 @ 05:03) (16 - 18)  SpO2: 96% (18 @ 05:03) (94% - 99%)  Wt(kg): --      Daily     Daily Weight in k.4 (2018 01:44)  I&O's Summary    10 Apr 2018 07:01  -  2018 07:00  --------------------------------------------------------  IN: 450 mL / OUT: 825 mL / NET: -375 mL          04-10-18 @ 07:01  -  18 @ 07:00  --------------------------------------------------------  IN: 450 mL / OUT: 825 mL / NET: -375 mL    Physical Exam:              Gen: NAD   	HEENT: anicteric  	Pulm: CTA B/L   	CV: RRR, Hepatojugular reflux.  	Back: dependent edema  	Abd: soft, nontender, nondistended  	:  cervantes  	LE: Warm, no edema  	Neuro: no asterixis  	Skin: Warm, without rashes  	Vascular access: none    LABS/STUDIES  --------------------------------------------------------------------------------              7.9    9.20  >-----------<  297      [18 07:15]              24.5     139  |  104  |  81  ----------------------------<  119      [18 07:15]  4.8   |  18  |  6.50        Ca     8.2     [18 07:15]      Mg     2.4     [18 07:15]      Phos  6.2     [18 07:15]      Creatinine Trend:  SCr 6.50 [:15]  SCr 6.37 [04-10 @ 06:10]  SCr 6.00 [:15]  SCr 5.36 [ 07:23]  SCr 4.90 [ 05:00]    Urinalysis - [04-10-18 @ 16:00]      Color YELLOW / Appearance HAZY / SG 1.021 / pH 5.5      Gluc NEGATIVE / Ketone NEGATIVE  / Bili NEGATIVE / Urobili NORMAL       Blood TRACE / Protein 100 / Leuk Est NEGATIVE / Nitrite NEGATIVE      RBC 2-5 / WBC 10-25 / Hyaline  / Gran  / Sq Epi  / Non Sq Epi  / Bacteria     Urine Sodium 21      [04-10-18 @ 16:00]  Urine Potassium 41.2      [04-10-18 @ 16:00]  Urine Chloride 10      [04-10-18 @ 16:00]    Iron 21, TIBC 163, %sat --      [18 @ 05:00]  Ferritin 540.7      [18 05:00]  .7 (Ca --)      [04-10-18 @ 06:10]   --  Vitamin D (25OH) 18.1      [04-10-18 @ 10:57]  HbA1c 5.9      [18 07:11]  TSH 3.22      [18 @ 06:00]      C3 Complement 156      [04-10-18 @ 06:10]  C4 Complement 50      [04-10-18 @ 06:10]      Radiology  --------------------------------------------------------------------------------    --------------------------------------------------------------------------------  García FarrellNov[facility] Health [facility] [phone]

## 2018-04-12 LAB
BASOPHILS # BLD AUTO: 0.04 K/UL — SIGNIFICANT CHANGE UP (ref 0–0.2)
BASOPHILS NFR BLD AUTO: 0.4 % — SIGNIFICANT CHANGE UP (ref 0–2)
BUN SERPL-MCNC: 79 MG/DL — HIGH (ref 7–23)
CALCIUM SERPL-MCNC: 8.7 MG/DL — SIGNIFICANT CHANGE UP (ref 8.4–10.5)
CHLORIDE SERPL-SCNC: 104 MMOL/L — SIGNIFICANT CHANGE UP (ref 98–107)
CO2 SERPL-SCNC: 18 MMOL/L — LOW (ref 22–31)
CREAT SERPL-MCNC: 6.35 MG/DL — HIGH (ref 0.5–1.3)
EOSINOPHIL # BLD AUTO: 0.24 K/UL — SIGNIFICANT CHANGE UP (ref 0–0.5)
EOSINOPHIL NFR BLD AUTO: 2.5 % — SIGNIFICANT CHANGE UP (ref 0–6)
GLUCOSE BLDC GLUCOMTR-MCNC: 115 MG/DL — HIGH (ref 70–99)
GLUCOSE BLDC GLUCOMTR-MCNC: 147 MG/DL — HIGH (ref 70–99)
GLUCOSE BLDC GLUCOMTR-MCNC: 153 MG/DL — HIGH (ref 70–99)
GLUCOSE BLDC GLUCOMTR-MCNC: 201 MG/DL — HIGH (ref 70–99)
GLUCOSE SERPL-MCNC: 94 MG/DL — SIGNIFICANT CHANGE UP (ref 70–99)
HCT VFR BLD CALC: 23.2 % — LOW (ref 39–50)
HGB BLD-MCNC: 7.3 G/DL — LOW (ref 13–17)
IMM GRANULOCYTES # BLD AUTO: 0.07 # — SIGNIFICANT CHANGE UP
IMM GRANULOCYTES NFR BLD AUTO: 0.7 % — SIGNIFICANT CHANGE UP (ref 0–1.5)
LYMPHOCYTES # BLD AUTO: 1.29 K/UL — SIGNIFICANT CHANGE UP (ref 1–3.3)
LYMPHOCYTES # BLD AUTO: 13.3 % — SIGNIFICANT CHANGE UP (ref 13–44)
MAGNESIUM SERPL-MCNC: 2.4 MG/DL — SIGNIFICANT CHANGE UP (ref 1.6–2.6)
MCHC RBC-ENTMCNC: 28.3 PG — SIGNIFICANT CHANGE UP (ref 27–34)
MCHC RBC-ENTMCNC: 31.5 % — LOW (ref 32–36)
MCV RBC AUTO: 89.9 FL — SIGNIFICANT CHANGE UP (ref 80–100)
MONOCYTES # BLD AUTO: 0.91 K/UL — HIGH (ref 0–0.9)
MONOCYTES NFR BLD AUTO: 9.4 % — SIGNIFICANT CHANGE UP (ref 2–14)
NEUTROPHILS # BLD AUTO: 7.16 K/UL — SIGNIFICANT CHANGE UP (ref 1.8–7.4)
NEUTROPHILS NFR BLD AUTO: 73.7 % — SIGNIFICANT CHANGE UP (ref 43–77)
NRBC # FLD: 0 — SIGNIFICANT CHANGE UP
PHOSPHATE SERPL-MCNC: 5.8 MG/DL — HIGH (ref 2.5–4.5)
PLATELET # BLD AUTO: 310 K/UL — SIGNIFICANT CHANGE UP (ref 150–400)
PMV BLD: 11.6 FL — SIGNIFICANT CHANGE UP (ref 7–13)
POTASSIUM SERPL-MCNC: 5.2 MMOL/L — SIGNIFICANT CHANGE UP (ref 3.5–5.3)
POTASSIUM SERPL-SCNC: 5.2 MMOL/L — SIGNIFICANT CHANGE UP (ref 3.5–5.3)
RBC # BLD: 2.58 M/UL — LOW (ref 4.2–5.8)
RBC # FLD: 15 % — HIGH (ref 10.3–14.5)
SODIUM SERPL-SCNC: 137 MMOL/L — SIGNIFICANT CHANGE UP (ref 135–145)
WBC # BLD: 9.71 K/UL — SIGNIFICANT CHANGE UP (ref 3.8–10.5)
WBC # FLD AUTO: 9.71 K/UL — SIGNIFICANT CHANGE UP (ref 3.8–10.5)

## 2018-04-12 PROCEDURE — 99232 SBSQ HOSP IP/OBS MODERATE 35: CPT

## 2018-04-12 RX ADMIN — HEPARIN SODIUM 5000 UNIT(S): 5000 INJECTION INTRAVENOUS; SUBCUTANEOUS at 22:20

## 2018-04-12 RX ADMIN — Medication 400 MILLIGRAM(S): at 13:19

## 2018-04-12 RX ADMIN — Medication 1 APPLICATION(S): at 13:20

## 2018-04-12 RX ADMIN — FAMOTIDINE 20 MILLIGRAM(S): 10 INJECTION INTRAVENOUS at 13:18

## 2018-04-12 RX ADMIN — Medication 200 MILLIGRAM(S): at 13:19

## 2018-04-12 RX ADMIN — OXYCODONE HYDROCHLORIDE 10 MILLIGRAM(S): 5 TABLET ORAL at 06:20

## 2018-04-12 RX ADMIN — Medication 400 MILLIGRAM(S): at 22:20

## 2018-04-12 RX ADMIN — Medication 200 MILLIGRAM(S): at 06:19

## 2018-04-12 RX ADMIN — TAMSULOSIN HYDROCHLORIDE 0.4 MILLIGRAM(S): 0.4 CAPSULE ORAL at 22:20

## 2018-04-12 RX ADMIN — HEPARIN SODIUM 5000 UNIT(S): 5000 INJECTION INTRAVENOUS; SUBCUTANEOUS at 13:19

## 2018-04-12 RX ADMIN — OXYCODONE HYDROCHLORIDE 10 MILLIGRAM(S): 5 TABLET ORAL at 06:19

## 2018-04-12 RX ADMIN — Medication 40 MILLIGRAM(S): at 18:45

## 2018-04-12 RX ADMIN — HEPARIN SODIUM 5000 UNIT(S): 5000 INJECTION INTRAVENOUS; SUBCUTANEOUS at 06:19

## 2018-04-12 RX ADMIN — Medication 40 MILLIGRAM(S): at 06:19

## 2018-04-12 RX ADMIN — PREGABALIN 1000 MICROGRAM(S): 225 CAPSULE ORAL at 13:19

## 2018-04-12 RX ADMIN — Medication 400 MILLIGRAM(S): at 06:19

## 2018-04-12 RX ADMIN — Medication 1 TABLET(S): at 13:18

## 2018-04-12 RX ADMIN — Medication 81 MILLIGRAM(S): at 13:18

## 2018-04-12 RX ADMIN — Medication 2001 MILLIGRAM(S): at 18:45

## 2018-04-12 RX ADMIN — Medication 200 MILLIGRAM(S): at 22:20

## 2018-04-12 RX ADMIN — ATORVASTATIN CALCIUM 80 MILLIGRAM(S): 80 TABLET, FILM COATED ORAL at 22:20

## 2018-04-12 RX ADMIN — Medication 2: at 09:50

## 2018-04-12 RX ADMIN — Medication 90 MILLIGRAM(S): at 06:19

## 2018-04-12 RX ADMIN — Medication 2001 MILLIGRAM(S): at 13:18

## 2018-04-12 NOTE — PROGRESS NOTE ADULT - ASSESSMENT
63y Male s/p left femoral to below knee popliteal artery bypass with left RSVG, jump graft from distal anastomosis to left PT using remaining RSV. Creatinine uptrending:  - Reg diet  - Pain control  - Strict I/O's  - Ortega in place  - F/u nephrology recommendations; holding gabapentin for now; will f/u labs, cbc w diff  - Trend Cr  - Will d/c every other staple from L leg incision site  - DVT ppx  d/c plan

## 2018-04-12 NOTE — PROGRESS NOTE ADULT - SUBJECTIVE AND OBJECTIVE BOX
Patient is a 63y old  Male who presents with a chief complaint of Left foot pain x 3 days. (2018 10:39)      SUBJECTIVE / OVERNIGHT EVENTS:  no event. PT denied complaints.     MEDICATIONS  (STANDING):  aspirin enteric coated 81 milliGRAM(s) Oral daily  atorvastatin 80 milliGRAM(s) Oral at bedtime  calcium acetate 2001 milliGRAM(s) Oral three times a day with meals  collagenase Ointment 1 Application(s) Topical daily  cyanocobalamin 1000 MICROGram(s) Oral daily  Dakins Solution - 1/2 Strength 1 Application(s) Topical daily  dextrose 50% Injectable 25 Gram(s) IV Push once  dextrose 50% Injectable 25 Gram(s) IV Push once  ergocalciferol 08013 Unit(s) Oral every week  famotidine    Tablet 20 milliGRAM(s) Oral daily  furosemide    Tablet 40 milliGRAM(s) Oral two times a day  heparin  Injectable 5000 Unit(s) SubCutaneous every 8 hours  insulin lispro (HumaLOG) corrective regimen sliding scale   SubCutaneous three times a day before meals  labetalol 200 milliGRAM(s) Oral three times a day  multivitamin 1 Tablet(s) Oral daily  NIFEdipine XL 90 milliGRAM(s) Oral daily  oxyCODONE  ER Tablet 10 milliGRAM(s) Oral every 12 hours  pentoxifylline 400 milliGRAM(s) Oral three times a day  polyethylene glycol 3350 17 Gram(s) Oral daily  silver nitrate Applicator 1 Application(s) Topical once  sodium chloride 0.9%. 1000 milliLiter(s) (75 mL/Hr) IV Continuous <Continuous>  tamsulosin 0.4 milliGRAM(s) Oral at bedtime    MEDICATIONS  (PRN):  acetaminophen   Tablet. 650 milliGRAM(s) Oral every 6 hours PRN Moderate Pain (4 - 6)  dextrose Gel 1 Dose(s) Oral once PRN Blood Glucose LESS THAN 70 milliGRAM(s)/deciliter  glucagon  Injectable 1 milliGRAM(s) IntraMuscular once PRN Glucose LESS THAN 70 milligrams/deciliter  oxyCODONE    5 mG/acetaminophen 325 mG 1 Tablet(s) Oral every 4 hours PRN Moderate Pain      T(C): 36.6 (18 @ 11:09), Max: 36.8 (18 @ 14:35)  HR: 65 (18 @ 11:09) (65 - 74)  BP: 137/65 (18 @ 11:09) (137/65 - 142/69)  RR: 16 (18 @ 11:09) (16 - 17)  SpO2: 96% (18 @ 11:09) (96% - 100%)  CAPILLARY BLOOD GLUCOSE      POCT Blood Glucose.: 153 mg/dL (2018 09:07)  POCT Blood Glucose.: 136 mg/dL (2018 21:24)  POCT Blood Glucose.: 153 mg/dL (2018 18:17)  POCT Blood Glucose.: 181 mg/dL (2018 13:05)    I&O's Summary    2018 07:  -  2018 07:00  --------------------------------------------------------  IN: 0 mL / OUT: 1000 mL / NET: -1000 mL    2018 07:01  -  2018 11:45  --------------------------------------------------------  IN: 0 mL / OUT: 200 mL / NET: -200 mL        PHYSICAL EXAM:  GENERAL: NAD, well-developed  HEAD:  Atraumatic, Normocephalic  EYES: EOMI, PERRLA, conjunctiva and sclera clear  NECK: Supple, No JVD  CHEST/LUNG: Clear to auscultation bilaterally; No wheeze  HEART: s1 s2, regular rhythm and rate   ABDOMEN: Soft, Nontender, Nondistended; Bowel sounds present  EXTREMITIES:  2+ Peripheral Pulses, No clubbing, cyanosis, or edema  PSYCH: AAOx3, calm   NEUROLOGY: non-focal  SKIN: No rashes or lesions    LABS:                        7.3    9.71  )-----------( 310      ( 2018 05:28 )             23.2     -    137  |  104  |  79<H>  ----------------------------<  94  5.2   |  18<L>  |  6.35<H>    Ca    8.7      2018 05:28  Phos  5.8     04-12  Mg     2.4     04-12            Urinalysis Basic - ( 10 Apr 2018 16:00 )    Color: YELLOW / Appearance: HAZY / S.021 / pH: 5.5  Gluc: NEGATIVE / Ketone: NEGATIVE  / Bili: NEGATIVE / Urobili: NORMAL mg/dL   Blood: TRACE / Protein: 100 mg/dL / Nitrite: NEGATIVE   Leuk Esterase: NEGATIVE / RBC: 2-5 / WBC 10-25   Sq Epi: x / Non Sq Epi: x / Bacteria: x        RADIOLOGY & ADDITIONAL TESTS:    Imaging Personally Reviewed:    Consultant(s) Notes Reviewed:      Care Discussed with Consultants/Other Providers: Patient is a 63y old  Male who presents with a chief complaint of Left foot pain x 3 days. (2018 10:39)      SUBJECTIVE / OVERNIGHT EVENTS:  no event. PT denied complaints.     MEDICATIONS  (STANDING):  aspirin enteric coated 81 milliGRAM(s) Oral daily  atorvastatin 80 milliGRAM(s) Oral at bedtime  calcium acetate 2001 milliGRAM(s) Oral three times a day with meals  collagenase Ointment 1 Application(s) Topical daily  cyanocobalamin 1000 MICROGram(s) Oral daily  Dakins Solution - 1/2 Strength 1 Application(s) Topical daily  dextrose 50% Injectable 25 Gram(s) IV Push once  dextrose 50% Injectable 25 Gram(s) IV Push once  ergocalciferol 81045 Unit(s) Oral every week  famotidine    Tablet 20 milliGRAM(s) Oral daily  furosemide    Tablet 40 milliGRAM(s) Oral two times a day  heparin  Injectable 5000 Unit(s) SubCutaneous every 8 hours  insulin lispro (HumaLOG) corrective regimen sliding scale   SubCutaneous three times a day before meals  labetalol 200 milliGRAM(s) Oral three times a day  multivitamin 1 Tablet(s) Oral daily  NIFEdipine XL 90 milliGRAM(s) Oral daily  oxyCODONE  ER Tablet 10 milliGRAM(s) Oral every 12 hours  pentoxifylline 400 milliGRAM(s) Oral three times a day  polyethylene glycol 3350 17 Gram(s) Oral daily  silver nitrate Applicator 1 Application(s) Topical once  sodium chloride 0.9%. 1000 milliLiter(s) (75 mL/Hr) IV Continuous <Continuous>  tamsulosin 0.4 milliGRAM(s) Oral at bedtime    MEDICATIONS  (PRN):  acetaminophen   Tablet. 650 milliGRAM(s) Oral every 6 hours PRN Moderate Pain (4 - 6)  dextrose Gel 1 Dose(s) Oral once PRN Blood Glucose LESS THAN 70 milliGRAM(s)/deciliter  glucagon  Injectable 1 milliGRAM(s) IntraMuscular once PRN Glucose LESS THAN 70 milligrams/deciliter  oxyCODONE    5 mG/acetaminophen 325 mG 1 Tablet(s) Oral every 4 hours PRN Moderate Pain      T(C): 36.6 (18 @ 11:09), Max: 36.8 (18 @ 14:35)  HR: 65 (18 @ 11:09) (65 - 74)  BP: 137/65 (18 @ 11:09) (137/65 - 142/69)  RR: 16 (18 @ 11:09) (16 - 17)  SpO2: 96% (18 @ 11:09) (96% - 100%)  CAPILLARY BLOOD GLUCOSE      POCT Blood Glucose.: 153 mg/dL (2018 09:07)  POCT Blood Glucose.: 136 mg/dL (2018 21:24)  POCT Blood Glucose.: 153 mg/dL (2018 18:17)  POCT Blood Glucose.: 181 mg/dL (2018 13:05)    I&O's Summary    2018 07:01  -  2018 07:00  --------------------------------------------------------  IN: 0 mL / OUT: 1000 mL / NET: -1000 mL    2018 07:01  -  2018 11:45  --------------------------------------------------------  IN: 0 mL / OUT: 200 mL / NET: -200 mL        PHYSICAL EXAM:  GENERAL: in bed in NAD  HEAD:  Atraumatic, Normocephalic  EYES: EOMI  NECK: Supple, No JVD  CHEST/LUNG: nonlabored breathing  HEART: nl S1/S2  ABDOMEN: nondistended, soft  EXT: Lt LE staples with bandage, RT BKA  PSYCH: alert, answering questions appropriately  NEUROLOGY: non-focal  SKIN: No rashes noted    LABS:                        7.3    9.71  )-----------( 310      ( 2018 05:28 )             23.2     -12    137  |  104  |  79<H>  ----------------------------<  94  5.2   |  18<L>  |  6.35<H>    Ca    8.7      2018 05:28  Phos  5.8     04-12  Mg     2.4     04-12            Urinalysis Basic - ( 10 Apr 2018 16:00 )    Color: YELLOW / Appearance: HAZY / S.021 / pH: 5.5  Gluc: NEGATIVE / Ketone: NEGATIVE  / Bili: NEGATIVE / Urobili: NORMAL mg/dL   Blood: TRACE / Protein: 100 mg/dL / Nitrite: NEGATIVE   Leuk Esterase: NEGATIVE / RBC: 2-5 / WBC 10-25   Sq Epi: x / Non Sq Epi: x / Bacteria: x        RADIOLOGY & ADDITIONAL TESTS:    Imaging Personally Reviewed:    Consultant(s) Notes Reviewed:      Care Discussed with Consultants/Other Providers:

## 2018-04-12 NOTE — PROGRESS NOTE ADULT - SUBJECTIVE AND OBJECTIVE BOX
VASCULAR DAILY PROGRESS NOTE:     Subjective: Patient seen and examined at bedside, resting comfortably, no complaints. No acute events.       Objective:    Physical Exam:    General: NAD  Lungs: non labored respirations  Extremities: plantar foot wound dry and stable, well adhered. Toe 3-5 eschars peeled off with red healthy tissue underneath, minimal oozing from plantar foot, no purulence. New dressing applied.     MEDICATIONS  (STANDING):  aspirin enteric coated 81 milliGRAM(s) Oral daily  atorvastatin 80 milliGRAM(s) Oral at bedtime  calcium acetate 2001 milliGRAM(s) Oral three times a day with meals  collagenase Ointment 1 Application(s) Topical daily  cyanocobalamin 1000 MICROGram(s) Oral daily  Dakins Solution - 1/2 Strength 1 Application(s) Topical daily  dextrose 50% Injectable 25 Gram(s) IV Push once  dextrose 50% Injectable 25 Gram(s) IV Push once  ergocalciferol 15492 Unit(s) Oral every week  famotidine    Tablet 20 milliGRAM(s) Oral daily  furosemide    Tablet 40 milliGRAM(s) Oral two times a day  heparin  Injectable 5000 Unit(s) SubCutaneous every 8 hours  insulin lispro (HumaLOG) corrective regimen sliding scale   SubCutaneous three times a day before meals  labetalol 200 milliGRAM(s) Oral three times a day  multivitamin 1 Tablet(s) Oral daily  NIFEdipine XL 90 milliGRAM(s) Oral daily  oxyCODONE  ER Tablet 10 milliGRAM(s) Oral every 12 hours  pentoxifylline 400 milliGRAM(s) Oral three times a day  polyethylene glycol 3350 17 Gram(s) Oral daily  silver nitrate Applicator 1 Application(s) Topical once  sodium chloride 0.9%. 1000 milliLiter(s) (75 mL/Hr) IV Continuous <Continuous>  tamsulosin 0.4 milliGRAM(s) Oral at bedtime    MEDICATIONS  (PRN):  acetaminophen   Tablet. 650 milliGRAM(s) Oral every 6 hours PRN Moderate Pain (4 - 6)  dextrose Gel 1 Dose(s) Oral once PRN Blood Glucose LESS THAN 70 milliGRAM(s)/deciliter  glucagon  Injectable 1 milliGRAM(s) IntraMuscular once PRN Glucose LESS THAN 70 milligrams/deciliter  oxyCODONE    5 mG/acetaminophen 325 mG 1 Tablet(s) Oral every 4 hours PRN Moderate Pain      Vital Signs Last 24 Hrs  T(C): 36.8 (2018 06:18), Max: 36.8 (2018 14:35)  T(F): 98.2 (2018 06:18), Max: 98.2 (2018 14:35)  HR: 70 (2018 06:18) (63 - 74)  BP: 137/70 (2018 06:18) (137/70 - 147/75)  BP(mean): --  RR: 16 (2018 06:18) (16 - 17)  SpO2: 100% (2018 06:18) (96% - 100%)    I&O's Detail    2018 07:01  -  2018 07:00  --------------------------------------------------------  IN:  Total IN: 0 mL    OUT:    Indwelling Catheter - Urethral: 1000 mL  Total OUT: 1000 mL    Total NET: -1000 mL      2018 07:01  -  2018 07:47  --------------------------------------------------------  IN:  Total IN: 0 mL    OUT:    Indwelling Catheter - Urethral: 200 mL  Total OUT: 200 mL    Total NET: -200 mL          Daily     Daily Weight in k.8 (2018 01:10)    LABS:                        7.3    9.71  )-----------( 310      ( 2018 05:28 )             23.2     04-12    137  |  104  |  79<H>  ----------------------------<  94  5.2   |  18<L>  |  6.35<H>    Ca    8.7      2018 05:28  Phos  5.8     04-12  Mg     2.4     04-12        Urinalysis Basic - ( 10 Apr 2018 16:00 )    Color: YELLOW / Appearance: HAZY / S.021 / pH: 5.5  Gluc: NEGATIVE / Ketone: NEGATIVE  / Bili: NEGATIVE / Urobili: NORMAL mg/dL   Blood: TRACE / Protein: 100 mg/dL / Nitrite: NEGATIVE   Leuk Esterase: NEGATIVE / RBC: 2-5 / WBC 10-25   Sq Epi: x / Non Sq Epi: x / Bacteria: x    Assessment/Plan: 63y Male s/p left femoral to below knee popliteal artery bypass with left RSVG, jump graft from distal anastomosis to left PT using remaining RSV. JANY on advanced CKD.     - Reg diet as tolerated  - Pain control prn  - Strict I/O's, cervantes for urine output monitoring  - Continue nephrology follow up  - F/u labs, Trend Cr  - DVT ppx  - D/c planning once bed available VASCULAR DAILY PROGRESS NOTE:     Subjective: Patient seen and examined at bedside, resting comfortably, no complaints. No acute events.       Objective:    Physical Exam:    General: NAD  Lungs: non labored respirations  Extremities: plantar foot wound dry and stable, well adhered. Toe 3-5 eschars peeled off with red healthy tissue underneath, minimal oozing from plantar foot, no purulence. New dressing applied.     MEDICATIONS  (STANDING):  aspirin enteric coated 81 milliGRAM(s) Oral daily  atorvastatin 80 milliGRAM(s) Oral at bedtime  calcium acetate 2001 milliGRAM(s) Oral three times a day with meals  collagenase Ointment 1 Application(s) Topical daily  cyanocobalamin 1000 MICROGram(s) Oral daily  Dakins Solution - 1/2 Strength 1 Application(s) Topical daily  dextrose 50% Injectable 25 Gram(s) IV Push once  dextrose 50% Injectable 25 Gram(s) IV Push once  ergocalciferol 65659 Unit(s) Oral every week  famotidine    Tablet 20 milliGRAM(s) Oral daily  furosemide    Tablet 40 milliGRAM(s) Oral two times a day  heparin  Injectable 5000 Unit(s) SubCutaneous every 8 hours  insulin lispro (HumaLOG) corrective regimen sliding scale   SubCutaneous three times a day before meals  labetalol 200 milliGRAM(s) Oral three times a day  multivitamin 1 Tablet(s) Oral daily  NIFEdipine XL 90 milliGRAM(s) Oral daily  oxyCODONE  ER Tablet 10 milliGRAM(s) Oral every 12 hours  pentoxifylline 400 milliGRAM(s) Oral three times a day  polyethylene glycol 3350 17 Gram(s) Oral daily  silver nitrate Applicator 1 Application(s) Topical once  sodium chloride 0.9%. 1000 milliLiter(s) (75 mL/Hr) IV Continuous <Continuous>  tamsulosin 0.4 milliGRAM(s) Oral at bedtime    MEDICATIONS  (PRN):  acetaminophen   Tablet. 650 milliGRAM(s) Oral every 6 hours PRN Moderate Pain (4 - 6)  dextrose Gel 1 Dose(s) Oral once PRN Blood Glucose LESS THAN 70 milliGRAM(s)/deciliter  glucagon  Injectable 1 milliGRAM(s) IntraMuscular once PRN Glucose LESS THAN 70 milligrams/deciliter  oxyCODONE    5 mG/acetaminophen 325 mG 1 Tablet(s) Oral every 4 hours PRN Moderate Pain      Vital Signs Last 24 Hrs  T(C): 36.8 (2018 06:18), Max: 36.8 (2018 14:35)  T(F): 98.2 (2018 06:18), Max: 98.2 (2018 14:35)  HR: 70 (2018 06:18) (63 - 74)  BP: 137/70 (2018 06:18) (137/70 - 147/75)  BP(mean): --  RR: 16 (2018 06:18) (16 - 17)  SpO2: 100% (2018 06:18) (96% - 100%)    I&O's Detail    2018 07:01  -  2018 07:00  --------------------------------------------------------  IN:  Total IN: 0 mL    OUT:    Indwelling Catheter - Urethral: 1000 mL  Total OUT: 1000 mL    Total NET: -1000 mL      2018 07:01  -  2018 07:47  --------------------------------------------------------  IN:  Total IN: 0 mL    OUT:    Indwelling Catheter - Urethral: 200 mL  Total OUT: 200 mL    Total NET: -200 mL          Daily     Daily Weight in k.8 (2018 01:10)    LABS:                        7.3    9.71  )-----------( 310      ( 2018 05:28 )             23.2     04-12    137  |  104  |  79<H>  ----------------------------<  94  5.2   |  18<L>  |  6.35<H>    Ca    8.7      2018 05:28  Phos  5.8     04-12  Mg     2.4     04-12        Urinalysis Basic - ( 10 Apr 2018 16:00 )    Color: YELLOW / Appearance: HAZY / S.021 / pH: 5.5  Gluc: NEGATIVE / Ketone: NEGATIVE  / Bili: NEGATIVE / Urobili: NORMAL mg/dL   Blood: TRACE / Protein: 100 mg/dL / Nitrite: NEGATIVE   Leuk Esterase: NEGATIVE / RBC: 2-5 / WBC 10-25   Sq Epi: x / Non Sq Epi: x / Bacteria: x    Assessment/Plan: 63y Male s/p left femoral to below knee popliteal artery bypass with left RSVG, jump graft from distal anastomosis to left PT using remaining RSV. JANY on advanced CKD.     - Reg diet as tolerated  - Pain control prn  - Strict I/O's, cervantes for urine output monitoring  - Continue nephrology follow up  - F/u labs, Trend Cr  - DVT ppx  - Remove every other staple from left leg incision today, and remove remaining staples tomorrow if patient still here  - D/c planning once bed available  - Discussed with Dr. Crump VASCULAR DAILY PROGRESS NOTE:     Subjective: Patient seen and examined at bedside, resting comfortably, no complaints. No acute events.       Objective: pt w/o new c/o     Physical Exam:  General: NAD  Lungs: non labored respirations  Extremities: plantar foot wound dry and stable, well adhered. Toe 3-5 eschars peeled off with red healthy tissue underneath, minimal oozing from plantar foot, no purulence. New dressing applied.     MEDICATIONS  (STANDING):  aspirin enteric coated 81 milliGRAM(s) Oral daily  atorvastatin 80 milliGRAM(s) Oral at bedtime  calcium acetate 2001 milliGRAM(s) Oral three times a day with meals  collagenase Ointment 1 Application(s) Topical daily  cyanocobalamin 1000 MICROGram(s) Oral daily  Dakins Solution - 1/2 Strength 1 Application(s) Topical daily  dextrose 50% Injectable 25 Gram(s) IV Push once  dextrose 50% Injectable 25 Gram(s) IV Push once  ergocalciferol 07126 Unit(s) Oral every week  famotidine    Tablet 20 milliGRAM(s) Oral daily  furosemide    Tablet 40 milliGRAM(s) Oral two times a day  heparin  Injectable 5000 Unit(s) SubCutaneous every 8 hours  insulin lispro (HumaLOG) corrective regimen sliding scale   SubCutaneous three times a day before meals  labetalol 200 milliGRAM(s) Oral three times a day  multivitamin 1 Tablet(s) Oral daily  NIFEdipine XL 90 milliGRAM(s) Oral daily  oxyCODONE  ER Tablet 10 milliGRAM(s) Oral every 12 hours  pentoxifylline 400 milliGRAM(s) Oral three times a day  polyethylene glycol 3350 17 Gram(s) Oral daily  silver nitrate Applicator 1 Application(s) Topical once  sodium chloride 0.9%. 1000 milliLiter(s) (75 mL/Hr) IV Continuous <Continuous>  tamsulosin 0.4 milliGRAM(s) Oral at bedtime    MEDICATIONS  (PRN):  acetaminophen   Tablet. 650 milliGRAM(s) Oral every 6 hours PRN Moderate Pain (4 - 6)  dextrose Gel 1 Dose(s) Oral once PRN Blood Glucose LESS THAN 70 milliGRAM(s)/deciliter  glucagon  Injectable 1 milliGRAM(s) IntraMuscular once PRN Glucose LESS THAN 70 milligrams/deciliter  oxyCODONE    5 mG/acetaminophen 325 mG 1 Tablet(s) Oral every 4 hours PRN Moderate Pain      Vital Signs Last 24 Hrs  T(C): 36.8 (2018 06:18), Max: 36.8 (2018 14:35)  T(F): 98.2 (2018 06:18), Max: 98.2 (2018 14:35)  HR: 70 (2018 06:18) (63 - 74)  BP: 137/70 (2018 06:18) (137/70 - 147/75)  BP(mean): --  RR: 16 (2018 06:18) (16 - 17)  SpO2: 100% (2018 06:18) (96% - 100%)    I&O's Detail    2018 07:01  -  2018 07:00  --------------------------------------------------------  IN:  Total IN: 0 mL    OUT:    Indwelling Catheter - Urethral: 1000 mL  Total OUT: 1000 mL    Total NET: -1000 mL      2018 07:01  -  2018 07:47  --------------------------------------------------------  IN:  Total IN: 0 mL    OUT:    Indwelling Catheter - Urethral: 200 mL  Total OUT: 200 mL    Total NET: -200 mL    Daily Weight in k.8 (2018 01:10)    LABS:                        7.3    9.71  )-----------( 310      ( 2018 05:28 )             23.2     04-12    137  |  104  |  79<H>  ----------------------------<  94  5.2   |  18<L>  |  6.35<H>    Ca    8.7      2018 05:28  Phos  5.8     04-12  Mg     2.4     04-12        Urinalysis Basic - ( 10 Apr 2018 16:00 )    Color: YELLOW / Appearance: HAZY / S.021 / pH: 5.5  Gluc: NEGATIVE / Ketone: NEGATIVE  / Bili: NEGATIVE / Urobili: NORMAL mg/dL   Blood: TRACE / Protein: 100 mg/dL / Nitrite: NEGATIVE   Leuk Esterase: NEGATIVE / RBC: 2-5 / WBC 10-25   Sq Epi: x / Non Sq Epi: x / Bacteria: x    Assessment/Plan: 63y Male s/p left femoral to below knee popliteal artery bypass with left RSVG, jump graft from distal anastomosis to left PT using remaining RSV. JANY on advanced CKD.     - Reg diet as tolerated  - Pain control prn  - Strict I/O's, cervantes for urine output monitoring  - Continue nephrology follow up  - F/u labs, Trend Cr  - DVT ppx  - Remove every other staple from left leg incision today, and remove remaining staples tomorrow if patient still here  - D/c planning once bed available  - Discussed with Dr. Crump

## 2018-04-12 NOTE — PROGRESS NOTE ADULT - ASSESSMENT
63 y.o. Male w/ hx DM, HTN, CKD stage 4, PVD s/p Rt BKA in 2009 p/w sepsis 2/2 cellulitis and LLE SFA disease was to have Lt fem-pop bypass but delayed due to acute hypoxic respiratory failure 2/2 fluid overload from acute diastolic CHF. Acute diastolic HF now resolved after bumex gtt now s/p  left femoral to below knee popliteal artery bypass with left RSVG, jump graft from distal anastomosis to left PT using remaining RSVG on 3/29    *Peripheral arterial disease  - s/p angiogram with multi vessel disease s/p LE dopplers & vein mapping  KENN .71 on left.   - s/p Femoral-popliteal bypass 3/29  - management as per vascular  - LLE improved with gabapentin and opiates  - c/w ASA and statin    *Anemia: possible acute blood loss anemia in setting of recent surgery and CKD. Likely from recent debridement and chronic oozing from LT leg debridement No melena or visible blood loss- per discussion with vascular team, stool was hemoccult negative  - monitor hgb, stable   -transfuse as needed maintain h/H above 7/21      *Acute respiratory failure with hypoxia, resolved   - s/p bumex gtt x3 days       *ATN (acute tubular necrosis)  - renal board  - Creatinine worsening CKD IV in the setting of ATN from sepsis & contrast induced nephropathy   - renal US negative for hydronephrosis  - avoid nephrotoxins  Lasix 40 bid per renal     *Wound infection  - Left foot with cellulitis with resulting sepsis now resolved.  - S/p vancomycin / zosyn currently off antibiotics as per vascular    *Hypertension, unspecified type  - BP acceptable  - c/w Nifedipine and labetalol    *Type 2 diabetes mellitus with complication, without long-term current use of insulin  - Pt on oral medications at home. Well controlled A1c-5.9  - c/w ISS while inpatient, will need to adjust oral meds upon dc given new baseline renal function    *BPH: cont tamsulosin

## 2018-04-13 VITALS
OXYGEN SATURATION: 96 % | HEART RATE: 66 BPM | SYSTOLIC BLOOD PRESSURE: 145 MMHG | RESPIRATION RATE: 17 BRPM | DIASTOLIC BLOOD PRESSURE: 50 MMHG | TEMPERATURE: 98 F

## 2018-04-13 LAB
BUN SERPL-MCNC: 79 MG/DL — HIGH (ref 7–23)
CALCIUM SERPL-MCNC: 9.2 MG/DL — SIGNIFICANT CHANGE UP (ref 8.4–10.5)
CHLORIDE SERPL-SCNC: 105 MMOL/L — SIGNIFICANT CHANGE UP (ref 98–107)
CO2 SERPL-SCNC: 18 MMOL/L — LOW (ref 22–31)
CREAT SERPL-MCNC: 5.75 MG/DL — HIGH (ref 0.5–1.3)
GLUCOSE BLDC GLUCOMTR-MCNC: 140 MG/DL — HIGH (ref 70–99)
GLUCOSE BLDC GLUCOMTR-MCNC: 179 MG/DL — HIGH (ref 70–99)
GLUCOSE BLDC GLUCOMTR-MCNC: 204 MG/DL — HIGH (ref 70–99)
GLUCOSE SERPL-MCNC: 117 MG/DL — HIGH (ref 70–99)
HCT VFR BLD CALC: 22.6 % — LOW (ref 39–50)
HGB BLD-MCNC: 7.3 G/DL — LOW (ref 13–17)
MAGNESIUM SERPL-MCNC: 2.3 MG/DL — SIGNIFICANT CHANGE UP (ref 1.6–2.6)
MCHC RBC-ENTMCNC: 28.1 PG — SIGNIFICANT CHANGE UP (ref 27–34)
MCHC RBC-ENTMCNC: 32.3 % — SIGNIFICANT CHANGE UP (ref 32–36)
MCV RBC AUTO: 86.9 FL — SIGNIFICANT CHANGE UP (ref 80–100)
NRBC # FLD: 0 — SIGNIFICANT CHANGE UP
PHOSPHATE SERPL-MCNC: 4.9 MG/DL — HIGH (ref 2.5–4.5)
PLATELET # BLD AUTO: 315 K/UL — SIGNIFICANT CHANGE UP (ref 150–400)
PMV BLD: 11.6 FL — SIGNIFICANT CHANGE UP (ref 7–13)
POTASSIUM SERPL-MCNC: 5.4 MMOL/L — HIGH (ref 3.5–5.3)
POTASSIUM SERPL-SCNC: 5.4 MMOL/L — HIGH (ref 3.5–5.3)
RBC # BLD: 2.6 M/UL — LOW (ref 4.2–5.8)
RBC # FLD: 15.2 % — HIGH (ref 10.3–14.5)
SODIUM SERPL-SCNC: 139 MMOL/L — SIGNIFICANT CHANGE UP (ref 135–145)
WBC # BLD: 13.03 K/UL — HIGH (ref 3.8–10.5)
WBC # FLD AUTO: 13.03 K/UL — HIGH (ref 3.8–10.5)

## 2018-04-13 PROCEDURE — 99232 SBSQ HOSP IP/OBS MODERATE 35: CPT

## 2018-04-13 RX ORDER — CALCIUM ACETATE 667 MG
3 TABLET ORAL
Qty: 0 | Refills: 0 | DISCHARGE
Start: 2018-04-13

## 2018-04-13 RX ADMIN — Medication 81 MILLIGRAM(S): at 11:41

## 2018-04-13 RX ADMIN — TAMSULOSIN HYDROCHLORIDE 0.4 MILLIGRAM(S): 0.4 CAPSULE ORAL at 21:14

## 2018-04-13 RX ADMIN — OXYCODONE HYDROCHLORIDE 10 MILLIGRAM(S): 5 TABLET ORAL at 07:05

## 2018-04-13 RX ADMIN — ATORVASTATIN CALCIUM 80 MILLIGRAM(S): 80 TABLET, FILM COATED ORAL at 21:14

## 2018-04-13 RX ADMIN — Medication 1 TABLET(S): at 11:42

## 2018-04-13 RX ADMIN — Medication 90 MILLIGRAM(S): at 06:05

## 2018-04-13 RX ADMIN — Medication 400 MILLIGRAM(S): at 21:15

## 2018-04-13 RX ADMIN — Medication 2001 MILLIGRAM(S): at 11:38

## 2018-04-13 RX ADMIN — Medication 200 MILLIGRAM(S): at 21:14

## 2018-04-13 RX ADMIN — HEPARIN SODIUM 5000 UNIT(S): 5000 INJECTION INTRAVENOUS; SUBCUTANEOUS at 13:57

## 2018-04-13 RX ADMIN — FAMOTIDINE 20 MILLIGRAM(S): 10 INJECTION INTRAVENOUS at 11:42

## 2018-04-13 RX ADMIN — OXYCODONE HYDROCHLORIDE 10 MILLIGRAM(S): 5 TABLET ORAL at 06:04

## 2018-04-13 RX ADMIN — HEPARIN SODIUM 5000 UNIT(S): 5000 INJECTION INTRAVENOUS; SUBCUTANEOUS at 06:05

## 2018-04-13 RX ADMIN — Medication 2001 MILLIGRAM(S): at 14:06

## 2018-04-13 RX ADMIN — Medication 200 MILLIGRAM(S): at 06:06

## 2018-04-13 RX ADMIN — HEPARIN SODIUM 5000 UNIT(S): 5000 INJECTION INTRAVENOUS; SUBCUTANEOUS at 21:17

## 2018-04-13 RX ADMIN — Medication 40 MILLIGRAM(S): at 06:06

## 2018-04-13 RX ADMIN — Medication 200 MILLIGRAM(S): at 14:06

## 2018-04-13 RX ADMIN — Medication 2: at 13:58

## 2018-04-13 RX ADMIN — Medication 400 MILLIGRAM(S): at 14:06

## 2018-04-13 RX ADMIN — Medication 400 MILLIGRAM(S): at 06:05

## 2018-04-13 RX ADMIN — PREGABALIN 1000 MICROGRAM(S): 225 CAPSULE ORAL at 11:41

## 2018-04-13 NOTE — PROGRESS NOTE ADULT - ASSESSMENT
63 y.o. Male w/ hx DM, HTN, CKD stage 4, PVD s/p Rt BKA in 2009 p/w sepsis 2/2 cellulitis and LLE SFA disease was to have Lt fem-pop bypass but delayed due to acute hypoxic respiratory failure 2/2 fluid overload from acute diastolic CHF. Acute diastolic HF now resolved after bumex gtt now s/p  left femoral to below knee popliteal artery bypass with left RSVG, jump graft from distal anastomosis to left PT using remaining RSVG on 3/29    *leukocytosis, new, no clinical change  monitor for now    *Peripheral arterial disease  - s/p angiogram with multi vessel disease s/p LE dopplers & vein mapping  KENN .71 on left.   - s/p Femoral-popliteal bypass 3/29  - management as per vascular  - LLE improved with gabapentin and opiates  - c/w ASA and statin    *Anemia: possible acute blood loss anemia in setting of recent surgery and CKD. Likely from recent debridement and chronic oozing from LT leg debridement No melena or visible blood loss- per discussion with vascular team, stool was hemoccult negative  - monitor hgb, stable   -transfuse as needed maintain h/H above 7/21      *Acute respiratory failure with hypoxia, resolved   - s/p bumex gtt x3 days       *ATN (acute tubular necrosis)  - renal board  - Creatinine worsening CKD IV in the setting of ATN from sepsis & contrast induced nephropathy   - renal US negative for hydronephrosis  - avoid nephrotoxins  Lasix 40 bid per renal     *Wound infection  - Left foot with cellulitis with resulting sepsis now resolved.  - S/p vancomycin / zosyn currently off antibiotics as per vascular    *Hypertension, unspecified type  - BP acceptable  - c/w Nifedipine and labetalol    *Type 2 diabetes mellitus with complication, without long-term current use of insulin  - Pt on oral medications at home. Well controlled A1c-5.9  - c/w ISS while inpatient, will need to adjust oral meds upon dc given new baseline renal function    *BPH: cont tamsulosin

## 2018-04-13 NOTE — PROGRESS NOTE ADULT - NSHPATTENDINGPLANDISCUSS_GEN_ALL_CORE
patient
surg ho
vascular
vascular
surg ho
vascular
patient
patient
primary team resident.
surg ho
team
vascular
medical team/resident
resident
surg ho
surg ho
surg ho and renal  attending
vascular
vascular
surg ho
medicine
patient
surg ho
team
Medicine attending Dr. Danielle and Vascular attending Dr. Crump
patient
patient
surg ho
patient
surg ho
team
patient
patient
surg ho
Dr. Severino
patient
patient
patient at the bedside
patient at the bedside and Dr. Crump
primary team
primary team.
surg ho
team
patient
team
patient
team
team
patient
patient, team discussed plan w/ nephrology
HS3
patient, housestaff, nephrology re: resolving ATN
patient, vascular attending, Team 3
patient, Team 3
Dr. Gallardo
patient, Team 3
HS3

## 2018-04-13 NOTE — PROGRESS NOTE ADULT - SUBJECTIVE AND OBJECTIVE BOX
[facility] DIVISION OF KIDNEY DISEASES AND HYPERTENSION -- FOLLOW UP NOTE  --------------------------------------------------------------------------------  García Moreno     --------------------------------------------------------------------------------  Chief Complaint: CKD    24 hour events/subjective:  no acute events      PAST HISTORY  --------------------------------------------------------------------------------  No significant changes to PMH, PSH, FHx, SHx, unless otherwise noted    ALLERGIES & MEDICATIONS  --------------------------------------------------------------------------------  Allergies    No Known Allergies    Intolerances      Standing Inpatient Medications  aspirin enteric coated 81 milliGRAM(s) Oral daily  atorvastatin 80 milliGRAM(s) Oral at bedtime  calcium acetate 2001 milliGRAM(s) Oral three times a day with meals  collagenase Ointment 1 Application(s) Topical daily  cyanocobalamin 1000 MICROGram(s) Oral daily  Dakins Solution - 1/2 Strength 1 Application(s) Topical daily  dextrose 50% Injectable 25 Gram(s) IV Push once  dextrose 50% Injectable 25 Gram(s) IV Push once  ergocalciferol 04356 Unit(s) Oral every week  famotidine    Tablet 20 milliGRAM(s) Oral daily  furosemide    Tablet 40 milliGRAM(s) Oral two times a day  heparin  Injectable 5000 Unit(s) SubCutaneous every 8 hours  insulin lispro (HumaLOG) corrective regimen sliding scale   SubCutaneous three times a day before meals  labetalol 200 milliGRAM(s) Oral three times a day  multivitamin 1 Tablet(s) Oral daily  NIFEdipine XL 90 milliGRAM(s) Oral daily  oxyCODONE  ER Tablet 10 milliGRAM(s) Oral every 12 hours  pentoxifylline 400 milliGRAM(s) Oral three times a day  polyethylene glycol 3350 17 Gram(s) Oral daily  silver nitrate Applicator 1 Application(s) Topical once  sodium chloride 0.9%. 1000 milliLiter(s) IV Continuous <Continuous>  tamsulosin 0.4 milliGRAM(s) Oral at bedtime    PRN Inpatient Medications  acetaminophen   Tablet. 650 milliGRAM(s) Oral every 6 hours PRN  dextrose Gel 1 Dose(s) Oral once PRN  glucagon  Injectable 1 milliGRAM(s) IntraMuscular once PRN      REVIEW OF SYSTEMS  --------------------------------------------------------------------------------  Review Of Systems:  Constitutional: [ ] Fever [ ] Chills [ ] Fatigue [ ] Weight change   HEENT: [ ] Blurred vision [ ] Eye Pain [ ] Headache [ ] Runny nose [ ] Sore Throat   Respiratory: [ ] Cough [ ] Wheezing [ ] Shortness of breath  Cardiovascular: [ ] Chest Pain [ ] Palpitations [ ] MENSAH [ ] PND [ ] Orthopnea  Gastrointestinal: [ ] Abdominal Pain [ ] Diarrhea [ ] Constipation [ ] Hemorrhoids [ ] Nausea [ ] Vomiting  Genitourinary: [ ] Nocturia [ ] Dysuria [ ] Incontinence  Extremities: [ ] Swelling [ ] Joint Pain  Neurologic: [ ] Focal deficit [ ] Paresthesias [ ] Syncope  Lymphatic: [ ] Swelling [ ] Lymphadenopathy   Skin: [ ] Rash [ ] Ecchymoses [ ] Wounds [ ] Lesions  Psychiatry: [ ] Depression [ ] Suicidal/Homicidal Ideation [ ] Anxiety [ ] Sleep Disturbances  [ x] 10 point review of systems is otherwise negative except as mentioned above       [ ]Unable to obtain    All other systems were reviewed and are negative, except as noted.    VITALS/PHYSICAL EXAM  --------------------------------------------------------------------------------  T(C): 36.5 (18 @ 10:11), Max: 37 (18 @ 21:22)  HR: 67 (18 @ 10:11) (64 - 73)  BP: 137/59 (18 @ 10:11) (133/55 - 150/67)  RR: 16 (18 @ 10:11) (14 - 18)  SpO2: 96% (18 @ 10:11) (94% - 96%)  Wt(kg): --      Daily     Daily Weight in k.5 (2018 01:36)  I&O's Summary    2018 07:01  -  2018 07:00  --------------------------------------------------------  IN: 0 mL / OUT: 1400 mL / NET: -1400 mL          18 @ 07:01  -  18 @ 07:00  --------------------------------------------------------  IN: 0 mL / OUT: 1400 mL / NET: -1400 mL        Physical Exam:              Gen: NAD   	HEENT: anicteric  	Pulm: CTA B/L   	CV: RRR  	Back: No dependent edema  	Abd: soft, nontender, nondistended  	:  billy  	LE: Warm, no edema  	Neuro: no asterixis  	Skin: Warm, without rashes  	Vascular access: none    LABS/STUDIES  --------------------------------------------------------------------------------              7.3    13.03 >-----------<  315      [18 05:45]              22.6     139  |  105  |  79  ----------------------------<  117      [18:45]  5.4   |  18  |  5.75        Ca     9.2     [18:45]      Mg     2.3     [18:45]      Phos  4.9     [18 05:45]            Creatinine Trend:  SCr 5.75 [:45]  SCr 6.35 [ 05:28]  SCr 6.50 [ 07:15]  SCr 6.37 [04-10 @ 06:10]  SCr 6.00 [ 06:15]    Urinalysis - [04-10-18 @ 16:00]      Color YELLOW / Appearance HAZY / SG 1.021 / pH 5.5      Gluc NEGATIVE / Ketone NEGATIVE  / Bili NEGATIVE / Urobili NORMAL       Blood TRACE / Protein 100 / Leuk Est NEGATIVE / Nitrite NEGATIVE      RBC 2-5 / WBC 10-25 / Hyaline  / Gran  / Sq Epi  / Non Sq Epi  / Bacteria     Urine Sodium 21      [04-10-18 @ 16:00]  Urine Urea Nitrogen 582.6      [18 19:45]  Urine Potassium 41.2      [04-10-18 @ 16:00]  Urine Chloride 10      [04-10-18 @ 16:00]    Iron 21, TIBC 163, %sat --      [18 @ 05:00]  Ferritin 540.7      [18 05:00]  .7 (Ca --)      [04-10-18 @ 06:10]   --  Vitamin D (25OH) 18.1      [04-10-18 @ 10:57]  HbA1c 5.9      [18 @ 07:11]  TSH 3.22      [18 @ 06:00]      C3 Complement 156      [04-10-18 @ 06:10]  C4 Complement 50      [04-10-18 @ 06:10]      Radiology  --------------------------------------------------------------------------------    --------------------------------------------------------------------------------  García Moreno

## 2018-04-13 NOTE — PROVIDER CONTACT NOTE (OTHER) - BACKGROUND
3/29 s/p L fem-pop bypass with RSVG and L pop to tib bypass with using remaining RSVG
62 y/o male admitted with cellulitis and peripheral vascular disease, history of HTN, DM
Patient admitted for cellulitis
cellulitis
patient 63 year old male, admitting diagnosis: cellulitis   no acute distress noted
pt dx with cellulitis. Hx of kidney disease, hypertension, and diabetes
pt going to O.R 3/15/2018 for fem-pop bypass
s/p left fem-pop bypass with RSVG and L pop-tib bypass with using remaining RSVG
3/29 s/p L fem-pop bypass and L pop tib bypass with RSVG
Patient admitted for cellulitis.
Patient admitted for cellulitis.
Pt. s/p L fem-pop bypass with PT anastamosis.
patient admitted with cellulitis
pt admitted for cellulitis
pt admitted for cellulitis
pt dx with cellulitis. Hx of kidney disease, hypertension, and diabetes
62 y/o male admitted with cellulitis and peripheral vascular disease, history of HTN, DM
Patient admitted for cellulitis.
Pt s/p left fem pop bypass.
Pt s/p left leg bypass
pt dx with cellulitis. Hx of kidney disease, hypertension, and diabetes
S/P Left FEM pop bypass/ Left tib bypass
pt admitted for cellulitis
pt admitted w cellulitis hx of kidney disease, diabetes, hypertension
pt dx with cellulitis. Hx of kidney disease, hypertension, and diabetes
pt. admitted for cellulitis.
Patient admitted for cellulitis.
patient admitted with cellulitis
pt dx with cellulitis. Hx of kidney disease, hypertension, and diabetes
pt. admitted for cellulitis.
64 y/o male admitted with cellulitis and peripheral vascular disease, history of HTN, DM
MD previously made aware of low urine output when cervantes inserted.
Patient admitted for cellulitis.
patient 63 year old male, admitting diagnosis: cellulitis   no acute distress noted
patient 63 year old male, admitting diagnosis: cellulitis   no acute distress noted
pt dx with cellulitis. Hx of kidney disease, hypertension, and diabetes
patient 63 year old male, admitting diagnosis: cellulitis
pt dx with cellulitis. Hx of kidney disease, hypertension, and diabetes
62 y/o male admitted with cellulitis and peripheral vascular disease, history of HTN, DM
63 year old male admitted with cellulitis
PT had urinary retention. Ortega inserted. Flomax given x1.
Patient admitted for Cellulitis
Patient admitted for sepsis.
patient admitted with cellulitis
pt admitted w cellulitis hx of kidney disease, diabetes, hypertension

## 2018-04-13 NOTE — PROGRESS NOTE ADULT - SUBJECTIVE AND OBJECTIVE BOX
VASCULAR DAILY PROGRESS NOTE:       Subjective: Pt seen this AM. NAEO. Tolerating PO intake. Denies N/V. Remains afebrile.       Objective:    PE:  Gen: NAD  Resp: non labored respirations  Extremities: plantar foot wound dry and stable, well adhered. Toe 3-5 eschars peeled off with red healthy tissue underneath, minimal oozing from plantar foot, no purulence. New dressing applied.  L DP/PT signals    Vital Signs Last 24 Hrs  T(C): 36.5 (2018 10:11), Max: 37 (2018 21:22)  T(F): 97.7 (2018 10:11), Max: 98.6 (2018 21:22)  HR: 67 (2018 10:11) (64 - 73)  BP: 137/59 (2018 10:11) (133/55 - 150/67)  BP(mean): --  RR: 16 (2018 10:11) (14 - 18)  SpO2: 96% (2018 10:11) (94% - 96%)    I&O's Detail    2018 07:01  -  2018 07:00  --------------------------------------------------------  IN:  Total IN: 0 mL    OUT:    Indwelling Catheter - Urethral: 1400 mL  Total OUT: 1400 mL    Total NET: -1400 mL          Daily     Daily Weight in k.5 (2018 01:36)    MEDICATIONS  (STANDING):  aspirin enteric coated 81 milliGRAM(s) Oral daily  atorvastatin 80 milliGRAM(s) Oral at bedtime  calcium acetate 2001 milliGRAM(s) Oral three times a day with meals  collagenase Ointment 1 Application(s) Topical daily  cyanocobalamin 1000 MICROGram(s) Oral daily  Dakins Solution - 1/2 Strength 1 Application(s) Topical daily  dextrose 50% Injectable 25 Gram(s) IV Push once  dextrose 50% Injectable 25 Gram(s) IV Push once  ergocalciferol 27144 Unit(s) Oral every week  famotidine    Tablet 20 milliGRAM(s) Oral daily  furosemide    Tablet 40 milliGRAM(s) Oral two times a day  heparin  Injectable 5000 Unit(s) SubCutaneous every 8 hours  insulin lispro (HumaLOG) corrective regimen sliding scale   SubCutaneous three times a day before meals  labetalol 200 milliGRAM(s) Oral three times a day  multivitamin 1 Tablet(s) Oral daily  NIFEdipine XL 90 milliGRAM(s) Oral daily  oxyCODONE  ER Tablet 10 milliGRAM(s) Oral every 12 hours  pentoxifylline 400 milliGRAM(s) Oral three times a day  polyethylene glycol 3350 17 Gram(s) Oral daily  silver nitrate Applicator 1 Application(s) Topical once  tamsulosin 0.4 milliGRAM(s) Oral at bedtime    MEDICATIONS  (PRN):  acetaminophen   Tablet. 650 milliGRAM(s) Oral every 6 hours PRN Moderate Pain (4 - 6)  dextrose Gel 1 Dose(s) Oral once PRN Blood Glucose LESS THAN 70 milliGRAM(s)/deciliter  glucagon  Injectable 1 milliGRAM(s) IntraMuscular once PRN Glucose LESS THAN 70 milligrams/deciliter      LABS:                        7.3    13.03 )-----------( 315      ( 2018 05:45 )             22.6     04-13    139  |  105  |  79<H>  ----------------------------<  117<H>  5.4<H>   |  18<L>  |  5.75<H>    Ca    9.2      2018 05:45  Phos  4.9     04-13  Mg     2.3     -13            RADIOLOGY & ADDITIONAL STUDIES:

## 2018-04-13 NOTE — PROVIDER CONTACT NOTE (OTHER) - RECOMMENDATIONS
Bladder scan
MD to be notified
Provider notified.
notify MD as per parameters
MD notified
MD to be notified
Notify MD
Notify MD,
Provider notified.
continue to monitor
give juice
MD assess need for new blood cx. give tylenol for fever
MD to be notified
PT encouraged to drink PO fluids.
Provider notified.
md notified
MD assess patient at bedside. continue to monitor
MD notified and aware.
Notify MD
place back on O2
Fluids?
MD to be notified
Notify provider for further intervention
Provider notified.
continue to monitor
notify MD as per parameters
notify MD as per parameters
Continue to monitor patient.
MD to be notified
MD to be notified
Provider notified.
Replace cervantes cath.
continue to monitor, RN to hold labetalol PO for low HR
notify MD, supplemental 02
Patient ordered for Zofran this am x1
continue to monitor.
MD notified and aware.
MD to be notified
Notify MD, give scheduled dose of labetolol 300mg.

## 2018-04-13 NOTE — PROVIDER CONTACT NOTE (OTHER) - REASON
Low O2 saturation
Pt. /52
diastolic <60
diastolic <60
emesis x1
low urine output
HR 55
Heart rate of 56. Blood pressure of 133/49.
refusing AM labs
Low urine output
Patient complaining of "Chest pain"
Patient refusing AM labs
Temp of 100.7
diastolic <60
no voids since 2000, 04/09/18
patient oxygen saturation 90% on room air
pt c/o shortness of breath and is on 02 via n/c
No urine out put
O2 stat 90% on room air
Patient blood pressure is 149/59
Pt with leaking urine around cervantes site.
diastolic  <60
heart rate 58
Blood pressure of 149/56.
HR 54, /54
clarify pre op blood transfusion order
low fingerstick
notify diastolic<60
DBP low
Patient's /53
Patient's /55
Pt. /55
Urine output low
diastolic  <60
Blood pressure of 143/51.
HR 60
Heart rate of 58. Metoprolol held.
Heart rate of 59.
blood pressure 140/58
blood pressure 152/54
blood pressure 156/51, patient in slight respiratory distress, 02 saturation 87%
diastolic <60
fs 152
hr 58, O2 sat 92%

## 2018-04-13 NOTE — PROGRESS NOTE ADULT - SUBJECTIVE AND OBJECTIVE BOX
Patient is a 63y old  Male who presents with a chief complaint of Left foot pain x 3 days. (19 Feb 2018 10:39)       INTERVAL HPI/OVERNIGHT EVENTS:  Patient seen and evaluated at bedside.  Pt is resting comfortable in NAD. Denies N/V/F/C.      Allergies    No Known Allergies    Intolerances        Vital Signs Last 24 Hrs  T(C): 36.8 (13 Apr 2018 04:58), Max: 37 (12 Apr 2018 21:22)  T(F): 98.3 (13 Apr 2018 04:58), Max: 98.6 (12 Apr 2018 21:22)  HR: 73 (13 Apr 2018 04:58) (64 - 73)  BP: 147/67 (13 Apr 2018 04:58) (133/55 - 150/67)  BP(mean): --  RR: 18 (13 Apr 2018 04:58) (14 - 18)  SpO2: 95% (13 Apr 2018 04:58) (94% - 96%)    LABS:                        7.3    13.03 )-----------( 315      ( 13 Apr 2018 05:45 )             22.6     04-13    139  |  105  |  79<H>  ----------------------------<  117<H>  5.4<H>   |  18<L>  |  5.75<H>    Ca    9.2      13 Apr 2018 05:45  Phos  4.9     04-13  Mg     2.3     04-13          CAPILLARY BLOOD GLUCOSE      POCT Blood Glucose.: 201 mg/dL (12 Apr 2018 21:29)  POCT Blood Glucose.: 147 mg/dL (12 Apr 2018 18:20)  POCT Blood Glucose.: 115 mg/dL (12 Apr 2018 12:59)  POCT Blood Glucose.: 153 mg/dL (12 Apr 2018 09:07)      Lower Extremity Physical Exam:  plantar foot wound dry and stable, well adhered.  toe 3-5 eschars peeled off with red healthy tissue underneath, mild oozing from plantar foot, no active bleeding, no purulence, no signs of infection

## 2018-04-13 NOTE — PROVIDER CONTACT NOTE (OTHER) - ACTION/TREATMENT ORDERED:
Juice given and patient's FS increased to 150, continue to monitor
MD SETHI notified, will pass along to day shift.
MD aware,
MD made aware. going to follow up with nephrology to see if lasix should be ordered. will continue to monitor
MD notified, MD to come assess patient, 2L oxygen NC as needed.
Will talk with patient
awaiting order to straight cath patient
MD aware of low urine output. Continue to monitor, no actions at this time.
MD aware, no further interventions ordered at this time.
MD aware. No interventions at this time.
MD to be notified. ok to hold as per parameters . No further interventions ordered at this time, nursing care to continue
continue to monitor.
MD Vargas aware, no further interventions ordered at this time, nursing care to continue
MD Vargas aware. No interventions needed. Will continue to monitor.
MD to be notified. No interventions ordered at this time, nursing care to continue
MD to be notified. ok to hold labetalol and give nefedepine and lasix . No further interventions ordered at this time, nursing care to continue
MD voiced it is pre op and for O.R orders. she will get in contact with vascular to clarify orders.
MD assessing patient. Awaiting further orders
MD notified, no further interventions at this time, will continue to monitor.
MD notified, supplemental 02 2L nasal cannula administered, patient 02 saturation 99%, will continue to monitor.
MD to be notified. ok to give all due medicine. No interventions ordered at this time, nursing care to continue
zofran, lasix, kayexelate. (see eMAR).
EKG and IVP Protonix
MD Vargas aware, ok to administer Labetalol with current vitals, nursing care to continue
MD aware of low urine output. Will continue to monitor.
MD aware, give lasix IVP
MD to assess pt at bedside
MD to be notified.  . No further interventions ordered at this time, nursing care to continue
No further interventions at this time
Okay to give blood pressure medications that are due.
Patient has CKD. His normal is output is low.
placed back on 2LNC and O2 sat increased to 95%
Blood cx will be ordered- will give tylenol
Continue to monitor.
MD aware. No interventions at this time.
Will order on sunrise
Continue to monitor.
Continue to monitor. Hold metoprolol.
Hold metoprolol. Continue to monitor.
MD aware, no further interventions ordered at this time.
MD notified, no further interventions at this time, will continue to monitor.
MD to be notified. No interventions ordered at this time, nursing care to continue
bladder scan done with 225 ml urine in bladder.  Ortega placed with 175 ML urine out put.  will continue to monitor output.
MD notified, no further interventions at this time, will continue to monitor.
MD to be notified. ok to give all due medicine . No further interventions ordered at this time, nursing care to continue
No actions or treatments at this time. Continue to monitor patient.
No further interventions at this time
Pt cervantes cath replaced with 16F catheter.  small amount of yellow urine output noted. No resistance met with cervantes insertion.

## 2018-04-13 NOTE — PROGRESS NOTE ADULT - SUBJECTIVE AND OBJECTIVE BOX
Patient is a 63y old  Male who presents with a chief complaint of Left foot pain x 3 days. (19 Feb 2018 10:39)      SUBJECTIVE / OVERNIGHT EVENTS:  no event. no complaints.     MEDICATIONS  (STANDING):  aspirin enteric coated 81 milliGRAM(s) Oral daily  atorvastatin 80 milliGRAM(s) Oral at bedtime  calcium acetate 2001 milliGRAM(s) Oral three times a day with meals  collagenase Ointment 1 Application(s) Topical daily  cyanocobalamin 1000 MICROGram(s) Oral daily  Dakins Solution - 1/2 Strength 1 Application(s) Topical daily  dextrose 50% Injectable 25 Gram(s) IV Push once  dextrose 50% Injectable 25 Gram(s) IV Push once  ergocalciferol 61364 Unit(s) Oral every week  famotidine    Tablet 20 milliGRAM(s) Oral daily  furosemide    Tablet 40 milliGRAM(s) Oral two times a day  heparin  Injectable 5000 Unit(s) SubCutaneous every 8 hours  insulin lispro (HumaLOG) corrective regimen sliding scale   SubCutaneous three times a day before meals  labetalol 200 milliGRAM(s) Oral three times a day  multivitamin 1 Tablet(s) Oral daily  NIFEdipine XL 90 milliGRAM(s) Oral daily  oxyCODONE  ER Tablet 10 milliGRAM(s) Oral every 12 hours  pentoxifylline 400 milliGRAM(s) Oral three times a day  polyethylene glycol 3350 17 Gram(s) Oral daily  silver nitrate Applicator 1 Application(s) Topical once  tamsulosin 0.4 milliGRAM(s) Oral at bedtime    MEDICATIONS  (PRN):  acetaminophen   Tablet. 650 milliGRAM(s) Oral every 6 hours PRN Moderate Pain (4 - 6)  dextrose Gel 1 Dose(s) Oral once PRN Blood Glucose LESS THAN 70 milliGRAM(s)/deciliter  glucagon  Injectable 1 milliGRAM(s) IntraMuscular once PRN Glucose LESS THAN 70 milligrams/deciliter      T(C): 36.5 (04-13-18 @ 10:11), Max: 37 (04-12-18 @ 21:22)  HR: 67 (04-13-18 @ 10:11) (64 - 73)  BP: 137/59 (04-13-18 @ 10:11) (133/55 - 150/67)  RR: 16 (04-13-18 @ 10:11) (14 - 18)  SpO2: 96% (04-13-18 @ 10:11) (94% - 96%)  CAPILLARY BLOOD GLUCOSE      POCT Blood Glucose.: 140 mg/dL (13 Apr 2018 09:31)  POCT Blood Glucose.: 201 mg/dL (12 Apr 2018 21:29)  POCT Blood Glucose.: 147 mg/dL (12 Apr 2018 18:20)  POCT Blood Glucose.: 115 mg/dL (12 Apr 2018 12:59)    I&O's Summary    12 Apr 2018 07:01  -  13 Apr 2018 07:00  --------------------------------------------------------  IN: 0 mL / OUT: 1400 mL / NET: -1400 mL        PHYSICAL EXAM:  GENERAL: NAD, well-developed  HEAD:  Atraumatic, Normocephalic  EYES: EOMI, PERRLA, conjunctiva and sclera clear  NECK: Supple, No JVD  CHEST/LUNG: Clear to auscultation bilaterally; No wheeze  HEART: s1 s2, regular rhythm and rate   ABDOMEN: Soft, Nontender, Nondistended; Bowel sounds present  EXTREMITIES:  2+ Peripheral Pulses, No clubbing, cyanosis, or edema  PSYCH: AAOx3, calm   NEUROLOGY: non-focal  SKIN: No rashes or lesions    LABS:                        7.3    13.03 )-----------( 315      ( 13 Apr 2018 05:45 )             22.6     04-13    139  |  105  |  79<H>  ----------------------------<  117<H>  5.4<H>   |  18<L>  |  5.75<H>    Ca    9.2      13 Apr 2018 05:45  Phos  4.9     04-13  Mg     2.3     04-13                RADIOLOGY & ADDITIONAL TESTS:    Imaging Personally Reviewed:    Consultant(s) Notes Reviewed:      Care Discussed with Consultants/Other Providers: Patient is a 63y old  Male who presents with a chief complaint of Left foot pain x 3 days. (19 Feb 2018 10:39)      SUBJECTIVE / OVERNIGHT EVENTS:  no event. no complaints.     MEDICATIONS  (STANDING):  aspirin enteric coated 81 milliGRAM(s) Oral daily  atorvastatin 80 milliGRAM(s) Oral at bedtime  calcium acetate 2001 milliGRAM(s) Oral three times a day with meals  collagenase Ointment 1 Application(s) Topical daily  cyanocobalamin 1000 MICROGram(s) Oral daily  Dakins Solution - 1/2 Strength 1 Application(s) Topical daily  dextrose 50% Injectable 25 Gram(s) IV Push once  dextrose 50% Injectable 25 Gram(s) IV Push once  ergocalciferol 51287 Unit(s) Oral every week  famotidine    Tablet 20 milliGRAM(s) Oral daily  furosemide    Tablet 40 milliGRAM(s) Oral two times a day  heparin  Injectable 5000 Unit(s) SubCutaneous every 8 hours  insulin lispro (HumaLOG) corrective regimen sliding scale   SubCutaneous three times a day before meals  labetalol 200 milliGRAM(s) Oral three times a day  multivitamin 1 Tablet(s) Oral daily  NIFEdipine XL 90 milliGRAM(s) Oral daily  oxyCODONE  ER Tablet 10 milliGRAM(s) Oral every 12 hours  pentoxifylline 400 milliGRAM(s) Oral three times a day  polyethylene glycol 3350 17 Gram(s) Oral daily  silver nitrate Applicator 1 Application(s) Topical once  tamsulosin 0.4 milliGRAM(s) Oral at bedtime    MEDICATIONS  (PRN):  acetaminophen   Tablet. 650 milliGRAM(s) Oral every 6 hours PRN Moderate Pain (4 - 6)  dextrose Gel 1 Dose(s) Oral once PRN Blood Glucose LESS THAN 70 milliGRAM(s)/deciliter  glucagon  Injectable 1 milliGRAM(s) IntraMuscular once PRN Glucose LESS THAN 70 milligrams/deciliter      T(C): 36.5 (04-13-18 @ 10:11), Max: 37 (04-12-18 @ 21:22)  HR: 67 (04-13-18 @ 10:11) (64 - 73)  BP: 137/59 (04-13-18 @ 10:11) (133/55 - 150/67)  RR: 16 (04-13-18 @ 10:11) (14 - 18)  SpO2: 96% (04-13-18 @ 10:11) (94% - 96%)  CAPILLARY BLOOD GLUCOSE      POCT Blood Glucose.: 140 mg/dL (13 Apr 2018 09:31)  POCT Blood Glucose.: 201 mg/dL (12 Apr 2018 21:29)  POCT Blood Glucose.: 147 mg/dL (12 Apr 2018 18:20)  POCT Blood Glucose.: 115 mg/dL (12 Apr 2018 12:59)    I&O's Summary    12 Apr 2018 07:01  -  13 Apr 2018 07:00  --------------------------------------------------------  IN: 0 mL / OUT: 1400 mL / NET: -1400 mL        PHYSICAL EXAM:  GENERAL: in bed in NAD  HEAD:  Atraumatic, Normocephalic  EYES: EOMI  NECK: Supple, No JVD  CHEST/LUNG: nonlabored breathing  HEART: nl S1/S2  ABDOMEN: nondistended, soft, + cervantes  EXT: Lt LE with bandage, RT BKA  PSYCH: alert, answering questions appropriately  NEUROLOGY: non-focal      LABS:                        7.3    13.03 )-----------( 315      ( 13 Apr 2018 05:45 )             22.6     04-13    139  |  105  |  79<H>  ----------------------------<  117<H>  5.4<H>   |  18<L>  |  5.75<H>    Ca    9.2      13 Apr 2018 05:45  Phos  4.9     04-13  Mg     2.3     04-13                RADIOLOGY & ADDITIONAL TESTS:    Imaging Personally Reviewed:    Consultant(s) Notes Reviewed:      Care Discussed with Consultants/Other Providers:

## 2018-04-13 NOTE — PROGRESS NOTE ADULT - PROBLEM/PLAN-1
DISPLAY PLAN FREE TEXT

## 2018-04-13 NOTE — PROGRESS NOTE ADULT - PROBLEM/PLAN-2
DISPLAY PLAN FREE TEXT

## 2018-04-13 NOTE — PROGRESS NOTE ADULT - PROBLEM SELECTOR PLAN 1
Pt. with JANY on CKD initally in the setting of infection and diarrhea. CKD in the setting of long-standing DM. Scr was 1.5 in 3/2017, increased to 2.30 in 7/2017. Pt. also underwent vascular study/left leg angiogram on 3/8. Scr on admission (2/12) was elevated at 3.81.  peaked to 6.3 on 2/23.  Patient again with JANY On ckd. Cr started to rise from 4.07 to 4.41 from 3/30-31. And then cr stayed stable between 4.5-5.  Cr increased from 4.49 on 4/5 to 4.6 on 4/6 and has been steadily rising since. weight stable. Patient received 1 unit prbc on 4/6 and 4/7 for low hemoglobin. (ischemic jany?) Gabapentin 200 mg was also start on 4/6 (now at 100 mg)  Of note Uop has also decreased from 1200 on 4/6 to 600 cc to now 300 cc  with cervantes placement  c3, c4 wnl (for thromboembolic phenomenon)  Pt. non oligouric at this time. Monitor BMP and urine output and daily weights. Avoid any potential nephrotoxins  Echo showed mod pulm htn with normal RV and LV function.   Would hold gabapentin at this time.  US renal noted. B/l pleural effusion patient also has low albumin.   EOS elevated. Cr now improving.  Ok to stop IV fluids.  Can restart gabapentin adjusted for renal function. If the patient is going to have labs done please do cbc with diff.

## 2018-04-13 NOTE — PROGRESS NOTE ADULT - ASSESSMENT
-pt seen and eval  -Cont collagenase and Dakins daily  -I would recommend course of abx as outpt for 10 days   -pt can follow up in wound care center  and perhaps try HBO as well  -Podiatrically stable for DC

## 2018-04-13 NOTE — PROVIDER CONTACT NOTE (OTHER) - SITUATION
HR 54, /54
Heart rate of 56. Blood pressure of 133/49.
Patient had one episode vomiting
Urine output only about 250 cc post cervantes insertion. MD notified.
blood pressure 140/58
Blood pressure of 149/56.
Heart rate of 58. Metoprolol held.
Patient complaining of "Chest pain"
Pt with no urine out put as of 12MN
Temp 100.6
bp 149/49 hr 72 pt due for labetalol
bp 149/54 hr 72
O2 stat 90% on room air
Patient's /53
Patient's /55
Pt S/P Leftbfem pop bypass 3/29/18. Pt with no voids since 2000 04/09/18. Bladder scan at 1030 113cc. No intervention ordered at that time. Pt C/O bladder discomfort. Bladder scan at 1520 , 223cc
Pt. /52
blood pressure 156/51, patient in slight respiratory distress, 02 saturation 87%
bp 133/52 hr 62
low fingerstick
notify diastolic<60; LL=626/49
 at bedtime
Blood pressure of 143/51.
HR 55
Pt with moderate amount of urine noted around cervantes cath site.
bp 135/50 hr 65
bp 146/53 hr 62
bp 152/55 hr 77  pt administered for labetalol, nifedipine
heart rate 58
hr 58n and o2 sat 92% this afternoon. Pt asymptomatic.
DBP low
Heart rate of 59.
Patient refusing AM labs
Pt. /55
clarify pre op blood transfusion order
Pt. refusing AM labs until later today.
bp 137/66 hr 56
low O2 sat
pt Chiquis admitted for cellulitis had 1 episode of emesis after kayexelate and lasix administration.
pt c/o shortness of breath and is on 02 via n/c
HR 60
Low urine output. Patient put out only 100 ml since start of shift at 20:00. Patient put out 450 ml since Ortega was placed at 15:30
Patient blood pressure is 149/59
Patient has temp of 100.7
blood pressure 152/54
patient oxygen saturation 90% on room air
PTs urine output on low side. 100 cc via cervantes for 3.5 hours.
post lasix bp 150/52 hr 61

## 2018-04-13 NOTE — PROGRESS NOTE ADULT - ATTENDING COMMENTS
Doing well. Reports no complaints. UO ~ 1400 last 24 hours which is much improved. Dependent edema is stable and resp status stable. No asterixis. He is AAO x3. Lytes stable, Cr improving.     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 Cont Lasix 40 mg PO BID. Cont Vit D and phos binders, although may need to monitor for hypophos now that patient is in renal recovery (if patient develops hypophos d/c phos binders). Stable for d/c from a renal standpoint with close outpatient renal f/u.

## 2018-04-13 NOTE — PROGRESS NOTE ADULT - ASSESSMENT
63y Male s/p left femoral to below knee popliteal artery bypass with left RSVG, jump graft from distal anastomosis to left PT using remaining RSV:  - Reg diet  - Pain control  - Strict I/O's  - Ortega in place  - Appreciate nephrology recommendations; will restart gabapentin (renal dosing); will f/u labs   - Trend Cr  - D/C'ed staples from LLE incision  - DVT ppx  - Dispo planing

## 2018-04-19 PROBLEM — Z00.00 ENCOUNTER FOR PREVENTIVE HEALTH EXAMINATION: Status: ACTIVE | Noted: 2018-04-19

## 2018-04-24 ENCOUNTER — APPOINTMENT (OUTPATIENT)
Dept: VASCULAR SURGERY | Facility: CLINIC | Age: 63
End: 2018-04-24
Payer: MEDICAID

## 2018-04-24 VITALS
HEART RATE: 64 BPM | BODY MASS INDEX: 24.11 KG/M2 | TEMPERATURE: 97.8 F | DIASTOLIC BLOOD PRESSURE: 66 MMHG | HEIGHT: 66 IN | WEIGHT: 150 LBS | SYSTOLIC BLOOD PRESSURE: 156 MMHG

## 2018-04-24 PROCEDURE — 99024 POSTOP FOLLOW-UP VISIT: CPT

## 2018-05-02 ENCOUNTER — INPATIENT (INPATIENT)
Facility: HOSPITAL | Age: 63
LOS: 8 days | Discharge: NURSING, REHAB OR LONG-TERM CARE | End: 2018-05-11
Attending: SURGERY | Admitting: SURGERY
Payer: MEDICAID

## 2018-05-02 ENCOUNTER — TRANSCRIPTION ENCOUNTER (OUTPATIENT)
Age: 63
End: 2018-05-02

## 2018-05-02 VITALS
SYSTOLIC BLOOD PRESSURE: 152 MMHG | TEMPERATURE: 98 F | HEART RATE: 58 BPM | RESPIRATION RATE: 20 BRPM | OXYGEN SATURATION: 100 % | DIASTOLIC BLOOD PRESSURE: 64 MMHG

## 2018-05-02 DIAGNOSIS — Z98.890 OTHER SPECIFIED POSTPROCEDURAL STATES: Chronic | ICD-10-CM

## 2018-05-02 DIAGNOSIS — Z89.511 ACQUIRED ABSENCE OF RIGHT LEG BELOW KNEE: Chronic | ICD-10-CM

## 2018-05-02 DIAGNOSIS — T81.30XA DISRUPTION OF WOUND, UNSPECIFIED, INITIAL ENCOUNTER: ICD-10-CM

## 2018-05-02 LAB
ALBUMIN SERPL ELPH-MCNC: 2.7 G/DL — LOW (ref 3.3–5)
ALP SERPL-CCNC: 117 U/L — SIGNIFICANT CHANGE UP (ref 40–120)
ALT FLD-CCNC: 27 U/L — SIGNIFICANT CHANGE UP (ref 4–41)
AST SERPL-CCNC: 29 U/L — SIGNIFICANT CHANGE UP (ref 4–40)
BASE EXCESS BLDV CALC-SCNC: -0.3 MMOL/L — SIGNIFICANT CHANGE UP
BASOPHILS # BLD AUTO: 0.04 K/UL — SIGNIFICANT CHANGE UP (ref 0–0.2)
BASOPHILS NFR BLD AUTO: 0.5 % — SIGNIFICANT CHANGE UP (ref 0–2)
BILIRUB SERPL-MCNC: 0.3 MG/DL — SIGNIFICANT CHANGE UP (ref 0.2–1.2)
BLD GP AB SCN SERPL QL: NEGATIVE — SIGNIFICANT CHANGE UP
BLOOD GAS VENOUS - CREATININE: 3.31 MG/DL — HIGH (ref 0.5–1.3)
BUN SERPL-MCNC: 46 MG/DL — HIGH (ref 7–23)
CALCIUM SERPL-MCNC: 8.1 MG/DL — LOW (ref 8.4–10.5)
CHLORIDE BLDV-SCNC: 102 MMOL/L — SIGNIFICANT CHANGE UP (ref 96–108)
CHLORIDE SERPL-SCNC: 97 MMOL/L — LOW (ref 98–107)
CO2 SERPL-SCNC: 22 MMOL/L — SIGNIFICANT CHANGE UP (ref 22–31)
CREAT SERPL-MCNC: 3.1 MG/DL — HIGH (ref 0.5–1.3)
EOSINOPHIL # BLD AUTO: 0.16 K/UL — SIGNIFICANT CHANGE UP (ref 0–0.5)
EOSINOPHIL NFR BLD AUTO: 2 % — SIGNIFICANT CHANGE UP (ref 0–6)
GAS PNL BLDV: 136 MMOL/L — SIGNIFICANT CHANGE UP (ref 136–146)
GLUCOSE BLDC GLUCOMTR-MCNC: 128 MG/DL — HIGH (ref 70–99)
GLUCOSE BLDV-MCNC: 133 — HIGH (ref 70–99)
GLUCOSE SERPL-MCNC: 132 MG/DL — HIGH (ref 70–99)
HCO3 BLDV-SCNC: 23 MMOL/L — SIGNIFICANT CHANGE UP (ref 20–27)
HCT VFR BLD CALC: 21 % — CRITICAL LOW (ref 39–50)
HCT VFR BLDV CALC: 21.7 % — LOW (ref 39–51)
HGB BLD-MCNC: 6.7 G/DL — CRITICAL LOW (ref 13–17)
HGB BLDV-MCNC: 6.9 G/DL — CRITICAL LOW (ref 13–17)
IMM GRANULOCYTES # BLD AUTO: 0.05 # — SIGNIFICANT CHANGE UP
IMM GRANULOCYTES NFR BLD AUTO: 0.6 % — SIGNIFICANT CHANGE UP (ref 0–1.5)
LACTATE BLDV-MCNC: 1.7 MMOL/L — SIGNIFICANT CHANGE UP (ref 0.5–2)
LYMPHOCYTES # BLD AUTO: 1.72 K/UL — SIGNIFICANT CHANGE UP (ref 1–3.3)
LYMPHOCYTES # BLD AUTO: 21.7 % — SIGNIFICANT CHANGE UP (ref 13–44)
MCHC RBC-ENTMCNC: 27.7 PG — SIGNIFICANT CHANGE UP (ref 27–34)
MCHC RBC-ENTMCNC: 31.9 % — LOW (ref 32–36)
MCV RBC AUTO: 86.8 FL — SIGNIFICANT CHANGE UP (ref 80–100)
MONOCYTES # BLD AUTO: 0.97 K/UL — HIGH (ref 0–0.9)
MONOCYTES NFR BLD AUTO: 12.2 % — SIGNIFICANT CHANGE UP (ref 2–14)
NEUTROPHILS # BLD AUTO: 5 K/UL — SIGNIFICANT CHANGE UP (ref 1.8–7.4)
NEUTROPHILS NFR BLD AUTO: 63 % — SIGNIFICANT CHANGE UP (ref 43–77)
NRBC # FLD: 0 — SIGNIFICANT CHANGE UP
PCO2 BLDV: 50 MMHG — SIGNIFICANT CHANGE UP (ref 41–51)
PH BLDV: 7.32 PH — SIGNIFICANT CHANGE UP (ref 7.32–7.43)
PLATELET # BLD AUTO: 303 K/UL — SIGNIFICANT CHANGE UP (ref 150–400)
PMV BLD: 10.5 FL — SIGNIFICANT CHANGE UP (ref 7–13)
PO2 BLDV: < 24 MMHG — LOW (ref 35–40)
POTASSIUM BLDV-SCNC: 4.4 MMOL/L — SIGNIFICANT CHANGE UP (ref 3.4–4.5)
POTASSIUM SERPL-MCNC: 4.5 MMOL/L — SIGNIFICANT CHANGE UP (ref 3.5–5.3)
POTASSIUM SERPL-SCNC: 4.5 MMOL/L — SIGNIFICANT CHANGE UP (ref 3.5–5.3)
PROT SERPL-MCNC: 7.3 G/DL — SIGNIFICANT CHANGE UP (ref 6–8.3)
RBC # BLD: 2.42 M/UL — LOW (ref 4.2–5.8)
RBC # FLD: 15 % — HIGH (ref 10.3–14.5)
RH IG SCN BLD-IMP: POSITIVE — SIGNIFICANT CHANGE UP
SAO2 % BLDV: 23.5 % — LOW (ref 60–85)
SODIUM SERPL-SCNC: 134 MMOL/L — LOW (ref 135–145)
WBC # BLD: 7.94 K/UL — SIGNIFICANT CHANGE UP (ref 3.8–10.5)
WBC # FLD AUTO: 7.94 K/UL — SIGNIFICANT CHANGE UP (ref 3.8–10.5)

## 2018-05-02 PROCEDURE — 99222 1ST HOSP IP/OBS MODERATE 55: CPT | Mod: 24

## 2018-05-02 RX ORDER — FUROSEMIDE 40 MG
40 TABLET ORAL
Refills: 0 | Status: DISCONTINUED | OUTPATIENT
Start: 2018-05-02 | End: 2018-05-11

## 2018-05-02 RX ORDER — PIPERACILLIN AND TAZOBACTAM 4; .5 G/20ML; G/20ML
3.38 INJECTION, POWDER, LYOPHILIZED, FOR SOLUTION INTRAVENOUS EVERY 8 HOURS
Refills: 0 | Status: DISCONTINUED | OUTPATIENT
Start: 2018-05-02 | End: 2018-05-02

## 2018-05-02 RX ORDER — DEXTROSE 50 % IN WATER 50 %
1 SYRINGE (ML) INTRAVENOUS ONCE
Refills: 0 | Status: DISCONTINUED | OUTPATIENT
Start: 2018-05-02 | End: 2018-05-11

## 2018-05-02 RX ORDER — DEXTROSE 50 % IN WATER 50 %
25 SYRINGE (ML) INTRAVENOUS ONCE
Refills: 0 | Status: DISCONTINUED | OUTPATIENT
Start: 2018-05-02 | End: 2018-05-11

## 2018-05-02 RX ORDER — NIFEDIPINE 30 MG
90 TABLET, EXTENDED RELEASE 24 HR ORAL DAILY
Refills: 0 | Status: DISCONTINUED | OUTPATIENT
Start: 2018-05-02 | End: 2018-05-02

## 2018-05-02 RX ORDER — VANCOMYCIN HCL 1 G
VIAL (EA) INTRAVENOUS
Refills: 0 | Status: DISCONTINUED | OUTPATIENT
Start: 2018-05-02 | End: 2018-05-02

## 2018-05-02 RX ORDER — GLUCAGON INJECTION, SOLUTION 0.5 MG/.1ML
1 INJECTION, SOLUTION SUBCUTANEOUS ONCE
Refills: 0 | Status: DISCONTINUED | OUTPATIENT
Start: 2018-05-02 | End: 2018-05-11

## 2018-05-02 RX ORDER — LABETALOL HCL 100 MG
100 TABLET ORAL
Refills: 0 | Status: DISCONTINUED | OUTPATIENT
Start: 2018-05-02 | End: 2018-05-02

## 2018-05-02 RX ORDER — PIPERACILLIN AND TAZOBACTAM 4; .5 G/20ML; G/20ML
3.38 INJECTION, POWDER, LYOPHILIZED, FOR SOLUTION INTRAVENOUS ONCE
Refills: 0 | Status: DISCONTINUED | OUTPATIENT
Start: 2018-05-02 | End: 2018-05-02

## 2018-05-02 RX ORDER — PIPERACILLIN AND TAZOBACTAM 4; .5 G/20ML; G/20ML
3.38 INJECTION, POWDER, LYOPHILIZED, FOR SOLUTION INTRAVENOUS EVERY 12 HOURS
Refills: 0 | Status: DISCONTINUED | OUTPATIENT
Start: 2018-05-02 | End: 2018-05-06

## 2018-05-02 RX ORDER — VANCOMYCIN HCL 1 G
750 VIAL (EA) INTRAVENOUS EVERY 24 HOURS
Refills: 0 | Status: DISCONTINUED | OUTPATIENT
Start: 2018-05-03 | End: 2018-05-06

## 2018-05-02 RX ORDER — INSULIN LISPRO 100/ML
VIAL (ML) SUBCUTANEOUS
Refills: 0 | Status: DISCONTINUED | OUTPATIENT
Start: 2018-05-02 | End: 2018-05-11

## 2018-05-02 RX ORDER — LABETALOL HCL 100 MG
100 TABLET ORAL
Refills: 0 | Status: DISCONTINUED | OUTPATIENT
Start: 2018-05-02 | End: 2018-05-11

## 2018-05-02 RX ORDER — SODIUM CHLORIDE 9 MG/ML
1000 INJECTION, SOLUTION INTRAVENOUS
Refills: 0 | Status: DISCONTINUED | OUTPATIENT
Start: 2018-05-02 | End: 2018-05-11

## 2018-05-02 RX ORDER — INSULIN LISPRO 100/ML
VIAL (ML) SUBCUTANEOUS AT BEDTIME
Refills: 0 | Status: DISCONTINUED | OUTPATIENT
Start: 2018-05-02 | End: 2018-05-11

## 2018-05-02 RX ORDER — VANCOMYCIN HCL 1 G
VIAL (EA) INTRAVENOUS
Refills: 0 | Status: DISCONTINUED | OUTPATIENT
Start: 2018-05-02 | End: 2018-05-06

## 2018-05-02 RX ORDER — METOPROLOL TARTRATE 50 MG
5 TABLET ORAL EVERY 6 HOURS
Refills: 0 | Status: DISCONTINUED | OUTPATIENT
Start: 2018-05-02 | End: 2018-05-02

## 2018-05-02 RX ORDER — ASPIRIN/CALCIUM CARB/MAGNESIUM 324 MG
81 TABLET ORAL DAILY
Refills: 0 | Status: DISCONTINUED | OUTPATIENT
Start: 2018-05-02 | End: 2018-05-11

## 2018-05-02 RX ORDER — NIFEDIPINE 30 MG
90 TABLET, EXTENDED RELEASE 24 HR ORAL DAILY
Refills: 0 | Status: DISCONTINUED | OUTPATIENT
Start: 2018-05-02 | End: 2018-05-11

## 2018-05-02 RX ORDER — TAMSULOSIN HYDROCHLORIDE 0.4 MG/1
0.4 CAPSULE ORAL AT BEDTIME
Refills: 0 | Status: DISCONTINUED | OUTPATIENT
Start: 2018-05-02 | End: 2018-05-11

## 2018-05-02 RX ORDER — VANCOMYCIN HCL 1 G
750 VIAL (EA) INTRAVENOUS ONCE
Refills: 0 | Status: COMPLETED | OUTPATIENT
Start: 2018-05-02 | End: 2018-05-02

## 2018-05-02 RX ORDER — DEXTROSE 50 % IN WATER 50 %
12.5 SYRINGE (ML) INTRAVENOUS ONCE
Refills: 0 | Status: DISCONTINUED | OUTPATIENT
Start: 2018-05-02 | End: 2018-05-11

## 2018-05-02 RX ORDER — HEPARIN SODIUM 5000 [USP'U]/ML
5000 INJECTION INTRAVENOUS; SUBCUTANEOUS EVERY 8 HOURS
Refills: 0 | Status: DISCONTINUED | OUTPATIENT
Start: 2018-05-02 | End: 2018-05-11

## 2018-05-02 RX ADMIN — Medication 90 MILLIGRAM(S): at 19:58

## 2018-05-02 RX ADMIN — Medication 81 MILLIGRAM(S): at 19:58

## 2018-05-02 RX ADMIN — TAMSULOSIN HYDROCHLORIDE 0.4 MILLIGRAM(S): 0.4 CAPSULE ORAL at 22:34

## 2018-05-02 RX ADMIN — Medication 100 MILLIGRAM(S): at 19:58

## 2018-05-02 RX ADMIN — Medication 40 MILLIGRAM(S): at 19:58

## 2018-05-02 RX ADMIN — Medication 150 MILLIGRAM(S): at 22:34

## 2018-05-02 RX ADMIN — HEPARIN SODIUM 5000 UNIT(S): 5000 INJECTION INTRAVENOUS; SUBCUTANEOUS at 22:34

## 2018-05-02 NOTE — ED ADULT NURSE REASSESSMENT NOTE - NS ED NURSE REASSESS COMMENT FT1
1st unit of PRBC given w/o s/s of reaction. 2nd unit initiated, with no s/s of reaction. VS post 15 min WNL. Will continue to monitor patient
Patient's blood transfusion initiated at 1800. Double-checked with 2nd RN. Patients VS post 15 WNL. Patient informed of possible reaction symptoms, patient verbalized teach-back and understanding. Patient appears NAD, on continuous cardiac and pulse ox monitor showing NSR and pulse ox >96%. Will continue to monitor patient.
Patient was assessed at 2015, profuse bleeding noted on the bed, dripping onto the floor. Approx 4 chucks wet with blood. Compression dressing applied, no bleeding noted at this time. Surgery team notified. Patient's VS WNL, appears NAD. Will endorse to next shift.
Patient's blood transfusion delayed at this time. When asked about consent for blood transfusion, patient stated "I'm not sure I will have to think about it." Patient is unsure about decision, MD and RN at bedside during attempt to obtain consent. Will re-evaluate in 10-15 minutes.

## 2018-05-02 NOTE — H&P ADULT - NSHPPHYSICALEXAM_GEN_ALL_CORE
Gen: NAD  HEENT: no scleral injection, EOMI, no neck masses  LLE: warm, upper leg incision well healed, lower leg incision covered with steristrips with 5cm section at [id number]-calf open with clotted blood. no active hemorrhage. palp fem, dopplerable graft, pt, dp  RLE: s/p BKA. warm.

## 2018-05-02 NOTE — ED PROVIDER NOTE - PHYSICAL EXAMINATION
Resident:   Gen: well appearing, of stated age, no acute distress  Head: NC, AT  ENT: PERRL, MMM  Neck: supple with full ROM   Chest: CTAB, no retractions, rate normal, appears to breathe comfortably  Heart: RRR S1S2, No peripheral edema, bilateral pulses in arms and legs  Abd: Soft non-tender, no rebound or guarding  Back: No spinal deformity, no CVAT  Ext: RLE with BKA, prosthesis in place. LLE with well-healing scar from left groin to left medial ankle with exception of 5cm area of wound dehiscence medial calf, oozing, without foul smell or purulent drainage, unable to palpate DP pulse, leg is warm and well perfused,   Neuro: fluid speech  Psych: No anxiety, depression or pressured speech noted  Skin: no urticaria, no diffuse rash

## 2018-05-02 NOTE — H&P ADULT - PSH
H/O peripheral artery bypass  left fem to below-knee pop with RSVG  S/P BKA (below knee amputation) unilateral, right

## 2018-05-02 NOTE — ED PROVIDER NOTE - MEDICAL DECISION MAKING DETAILS
Resident: 63y M s/p fem pop bypass presents with wound dehiscence. vitals wnl. distal medial aspect of wound with dehiscence. will obtain labs, consult surgery.

## 2018-05-02 NOTE — H&P ADULT - HISTORY OF PRESENT ILLNESS
62yo M with hx DM, CKD, HTN, R BKA, recent admission for left fem to below-knee pop bypass with RSVG (March 2018), now sent in from rehab with dehiscence of his lower extremity wound spanning approximately 4-5cm. He recently followed up with Dr. Crump in the office at which point his staples were removed and steristrips were applied. At rehab, he was engaging in physical therapy. It is unclear at what point the wound opened up (pt poor historian). Denies fevers/chills, no pain in the leg.

## 2018-05-02 NOTE — H&P ADULT - NSHPLABSRESULTS_GEN_ALL_CORE
CBC Full  -  ( 02 May 2018 14:07 )  WBC Count : 7.94 K/uL  Hemoglobin : 6.7 g/dL  Hematocrit : 21.0 %  Platelet Count - Automated : 303 K/uL  Mean Cell Volume : 86.8 fL  Mean Cell Hemoglobin : 27.7 pg  Mean Cell Hemoglobin Concentration : 31.9 %  Auto Neutrophil # : 5.00 K/uL  Auto Lymphocyte # : 1.72 K/uL  Auto Monocyte # : 0.97 K/uL  Auto Eosinophil # : 0.16 K/uL  Auto Basophil # : 0.04 K/uL  Auto Neutrophil % : 63.0 %  Auto Lymphocyte % : 21.7 %  Auto Monocyte % : 12.2 %  Auto Eosinophil % : 2.0 %  Auto Basophil % : 0.5 %      05-02    134<L>  |  97<L>  |  46<H>  ----------------------------<  132<H>  4.5   |  22  |  3.10<H>    Ca    8.1<L>      02 May 2018 14:07    TPro  7.3  /  Alb  2.7<L>  /  TBili  0.3  /  DBili  x   /  AST  29  /  ALT  27  /  AlkPhos  117  05-02

## 2018-05-02 NOTE — ED PROVIDER NOTE - CARE PLAN
Principal Discharge DX:	Wound dehiscence Principal Discharge DX:	Wound dehiscence  Secondary Diagnosis:	Anemia, unspecified type

## 2018-05-02 NOTE — ED ADULT NURSE NOTE - ED STAT RN HANDOFF DETAILS
CT not done in ED, patient transferred to unit via stretcher with compression dressing on left leg. PM meds given in ED, in no acute distress at this time. Blood transfusion completed in ED.

## 2018-05-02 NOTE — ED PROVIDER NOTE - OBJECTIVE STATEMENT
Resident: 63y M PMH DM, HTN, CKD, R BKA in 2009, s/p left fem pop bypass with RSVG 3/29 ([facility]) presents from rehab with bleeding from surgical site. Patient states wound has been cared for daily at rehab, is unable to ambulate. States wound has been bleeding x 1 wk, sent in to be evaluated by surgery.

## 2018-05-02 NOTE — ED PROVIDER NOTE - ATTENDING CONTRIBUTION TO CARE
José Luis: 62 yo male with a h/o DM, HTN, CKD, R BKA in 2009, s/p left fem pop bypass with RSVG 3/29 sent in from inpatient rehab for evaluation of bleeding from his surgical site. Pt endorses bleeding x 1 week but denies worsening pain and fevers. Pt is a poor historian and unable to provide much history. Exam: chronically ill appearing, NAD, MMM, pink conjunctiva, +S1/S2, no tachycardia, lungs CTA b/l, abdomen is soft and nontender. + right BKA, + surgical incision intact from groin to medial ankle except for 7cm area of dehiscence over proximal medial calf, + bloody ooze, no surrounding erythema or signs of infection, foot is warm and well perfused distally, no palpable pulse but doppler detectable. A/P-62 yo male with wound dehiscence s/p vascular bypass. will obtain labs, and consult vascular surgery.

## 2018-05-02 NOTE — ED PROVIDER NOTE - PROGRESS NOTE DETAILS
Resident: Low Hb, will obtain type and plan to transfuse. surgical resident at bedside, awaiting patient to be evaluated by fellow Resident: wound suddenly start pouring out blood. Discussed with surgery, resident aware. Blood transfusion is running, bulky pressure dressing applied, patient currently hemodynamically stable.

## 2018-05-02 NOTE — ED ADULT NURSE NOTE - OBJECTIVE STATEMENT
Alert and oriented x 4. Pt received to spot 21 due to leg pain and bleeding. Pt states: " I had surgery on my left foot and now its bleeding. "  Pt has Right BKA and Left leg is bandaged. No bleeding noted at this time.  Pt denies chest pain, shortness of breath, nausea, vomiting or dizziness. No painful urination or diarrhea. Pt needs assistance. Labs drawn. VSS. Will continue to monitor.

## 2018-05-02 NOTE — ED ADULT TRIAGE NOTE - CHIEF COMPLAINT QUOTE
From [facility] for left lower leg wound    S/P femoral below knee popliteal A. Bypass.

## 2018-05-02 NOTE — H&P ADULT - ASSESSMENT
64yo M hx of left fem to bk pop with RSVG, now presents with wound dehiscence 2/2 hematoma. HD stable. Low H/H in ED s/p 2 units pRBCs.   - Wound care: pack with gauze, cover with gauze, wrap  - Operative planning for Saturday for washout  - IV abx (vanc/zosyn; renal dosing)  - CT non-con left lower extremity  - home meds  - seen and examined with Dr. Alisia NEWSOME team  [id number] 64yo M hx of left fem to bk pop with RSVG, now presents with wound dehiscence 2/2 hematoma. HD stable. Low H/H in ED s/p 2 units pRBCs.   - Wound care: pack with gauze, cover with gauze, wrap    - Operative planning for Saturday for washout  - IV abx (vanc/zosyn; renal dosing)  - CT non-con left lower extremity s/o graft blowout  pt was seen in ov  no wound issues  - home meds  - seen and examined with Dr. Alisia NEWSOME team  [id number]

## 2018-05-02 NOTE — ED PROVIDER NOTE - NS ED ROS FT
Constitutional: no fever, no chills.  Eyes: no visual changes.  ENMT: no sore throat.  CV: no chest pain.  Resp: no cough, no shortness of breath.  GI: no abdominal pain, no nausea, no vomiting, no diarrhea.  : no dysuria, no hematuria.  MSK: no back pain, no neck pain.  Skin: no rashes.  Neuro: no headache, no loss of consciousness, no weakness, no numbness, no tingling.

## 2018-05-03 DIAGNOSIS — T81.30XA DISRUPTION OF WOUND, UNSPECIFIED, INITIAL ENCOUNTER: ICD-10-CM

## 2018-05-03 LAB
APTT BLD: 27.3 SEC — LOW (ref 27.5–37.4)
BUN SERPL-MCNC: 46 MG/DL — HIGH (ref 7–23)
BUN SERPL-MCNC: 51 MG/DL — HIGH (ref 7–23)
CALCIUM SERPL-MCNC: 7.7 MG/DL — LOW (ref 8.4–10.5)
CALCIUM SERPL-MCNC: 7.9 MG/DL — LOW (ref 8.4–10.5)
CHLORIDE SERPL-SCNC: 100 MMOL/L — SIGNIFICANT CHANGE UP (ref 98–107)
CHLORIDE SERPL-SCNC: 99 MMOL/L — SIGNIFICANT CHANGE UP (ref 98–107)
CO2 SERPL-SCNC: 22 MMOL/L — SIGNIFICANT CHANGE UP (ref 22–31)
CO2 SERPL-SCNC: 22 MMOL/L — SIGNIFICANT CHANGE UP (ref 22–31)
CREAT SERPL-MCNC: 3.07 MG/DL — HIGH (ref 0.5–1.3)
CREAT SERPL-MCNC: 3.09 MG/DL — HIGH (ref 0.5–1.3)
GLUCOSE BLDC GLUCOMTR-MCNC: 102 MG/DL — HIGH (ref 70–99)
GLUCOSE BLDC GLUCOMTR-MCNC: 143 MG/DL — HIGH (ref 70–99)
GLUCOSE BLDC GLUCOMTR-MCNC: 147 MG/DL — HIGH (ref 70–99)
GLUCOSE BLDC GLUCOMTR-MCNC: 158 MG/DL — HIGH (ref 70–99)
GLUCOSE BLDC GLUCOMTR-MCNC: 182 MG/DL — HIGH (ref 70–99)
GLUCOSE SERPL-MCNC: 115 MG/DL — HIGH (ref 70–99)
GLUCOSE SERPL-MCNC: 92 MG/DL — SIGNIFICANT CHANGE UP (ref 70–99)
GRAM STN WND: SIGNIFICANT CHANGE UP
HBA1C BLD-MCNC: 5.5 % — SIGNIFICANT CHANGE UP (ref 4–5.6)
HCT VFR BLD CALC: 22.5 % — LOW (ref 39–50)
HCT VFR BLD CALC: 24.4 % — LOW (ref 39–50)
HGB BLD-MCNC: 7.3 G/DL — LOW (ref 13–17)
HGB BLD-MCNC: 8.1 G/DL — LOW (ref 13–17)
INR BLD: 1.02 — SIGNIFICANT CHANGE UP (ref 0.88–1.17)
MAGNESIUM SERPL-MCNC: 1.9 MG/DL — SIGNIFICANT CHANGE UP (ref 1.6–2.6)
MCHC RBC-ENTMCNC: 26.9 PG — LOW (ref 27–34)
MCHC RBC-ENTMCNC: 27.5 PG — SIGNIFICANT CHANGE UP (ref 27–34)
MCHC RBC-ENTMCNC: 32.4 % — SIGNIFICANT CHANGE UP (ref 32–36)
MCHC RBC-ENTMCNC: 33.2 % — SIGNIFICANT CHANGE UP (ref 32–36)
MCV RBC AUTO: 82.7 FL — SIGNIFICANT CHANGE UP (ref 80–100)
MCV RBC AUTO: 83 FL — SIGNIFICANT CHANGE UP (ref 80–100)
NRBC # FLD: 0 — SIGNIFICANT CHANGE UP
NRBC # FLD: 0 — SIGNIFICANT CHANGE UP
PHOSPHATE SERPL-MCNC: 4.1 MG/DL — SIGNIFICANT CHANGE UP (ref 2.5–4.5)
PLATELET # BLD AUTO: 269 K/UL — SIGNIFICANT CHANGE UP (ref 150–400)
PLATELET # BLD AUTO: 275 K/UL — SIGNIFICANT CHANGE UP (ref 150–400)
PMV BLD: 10 FL — SIGNIFICANT CHANGE UP (ref 7–13)
PMV BLD: 10.1 FL — SIGNIFICANT CHANGE UP (ref 7–13)
POTASSIUM SERPL-MCNC: 4 MMOL/L — SIGNIFICANT CHANGE UP (ref 3.5–5.3)
POTASSIUM SERPL-MCNC: 4.3 MMOL/L — SIGNIFICANT CHANGE UP (ref 3.5–5.3)
POTASSIUM SERPL-SCNC: 4 MMOL/L — SIGNIFICANT CHANGE UP (ref 3.5–5.3)
POTASSIUM SERPL-SCNC: 4.3 MMOL/L — SIGNIFICANT CHANGE UP (ref 3.5–5.3)
PROTHROM AB SERPL-ACNC: 11.7 SEC — SIGNIFICANT CHANGE UP (ref 9.8–13.1)
RBC # BLD: 2.71 M/UL — LOW (ref 4.2–5.8)
RBC # BLD: 2.95 M/UL — LOW (ref 4.2–5.8)
RBC # FLD: 15.2 % — HIGH (ref 10.3–14.5)
RBC # FLD: 15.7 % — HIGH (ref 10.3–14.5)
SODIUM SERPL-SCNC: 135 MMOL/L — SIGNIFICANT CHANGE UP (ref 135–145)
SODIUM SERPL-SCNC: 138 MMOL/L — SIGNIFICANT CHANGE UP (ref 135–145)
SPECIMEN SOURCE: SIGNIFICANT CHANGE UP
WBC # BLD: 5.96 K/UL — SIGNIFICANT CHANGE UP (ref 3.8–10.5)
WBC # BLD: 6.74 K/UL — SIGNIFICANT CHANGE UP (ref 3.8–10.5)
WBC # FLD AUTO: 5.96 K/UL — SIGNIFICANT CHANGE UP (ref 3.8–10.5)
WBC # FLD AUTO: 6.74 K/UL — SIGNIFICANT CHANGE UP (ref 3.8–10.5)

## 2018-05-03 PROCEDURE — 73700 CT LOWER EXTREMITY W/O DYE: CPT | Mod: 26,LT

## 2018-05-03 PROCEDURE — 99232 SBSQ HOSP IP/OBS MODERATE 35: CPT | Mod: 57

## 2018-05-03 PROCEDURE — 99223 1ST HOSP IP/OBS HIGH 75: CPT

## 2018-05-03 PROCEDURE — 10140 I&D HMTMA SEROMA/FLUID COLLJ: CPT | Mod: 78,LT

## 2018-05-03 PROCEDURE — 37618 LIGATION MAJOR ARTERY XTR: CPT | Mod: 78,LT

## 2018-05-03 PROCEDURE — 71045 X-RAY EXAM CHEST 1 VIEW: CPT | Mod: 26

## 2018-05-03 PROCEDURE — 93010 ELECTROCARDIOGRAM REPORT: CPT

## 2018-05-03 RX ORDER — MORPHINE SULFATE 50 MG/1
2 CAPSULE, EXTENDED RELEASE ORAL EVERY 4 HOURS
Refills: 0 | Status: DISCONTINUED | OUTPATIENT
Start: 2018-05-03 | End: 2018-05-09

## 2018-05-03 RX ORDER — SODIUM CHLORIDE 9 MG/ML
1000 INJECTION, SOLUTION INTRAVENOUS
Refills: 0 | Status: DISCONTINUED | OUTPATIENT
Start: 2018-05-03 | End: 2018-05-03

## 2018-05-03 RX ORDER — MORPHINE SULFATE 50 MG/1
4 CAPSULE, EXTENDED RELEASE ORAL EVERY 4 HOURS
Refills: 0 | Status: DISCONTINUED | OUTPATIENT
Start: 2018-05-03 | End: 2018-05-09

## 2018-05-03 RX ADMIN — HEPARIN SODIUM 5000 UNIT(S): 5000 INJECTION INTRAVENOUS; SUBCUTANEOUS at 13:08

## 2018-05-03 RX ADMIN — MORPHINE SULFATE 4 MILLIGRAM(S): 50 CAPSULE, EXTENDED RELEASE ORAL at 20:09

## 2018-05-03 RX ADMIN — Medication 2: at 14:22

## 2018-05-03 RX ADMIN — HEPARIN SODIUM 5000 UNIT(S): 5000 INJECTION INTRAVENOUS; SUBCUTANEOUS at 21:41

## 2018-05-03 RX ADMIN — Medication 40 MILLIGRAM(S): at 21:40

## 2018-05-03 RX ADMIN — Medication 100 MILLIGRAM(S): at 21:41

## 2018-05-03 RX ADMIN — Medication 81 MILLIGRAM(S): at 13:08

## 2018-05-03 RX ADMIN — Medication 150 MILLIGRAM(S): at 21:22

## 2018-05-03 RX ADMIN — TAMSULOSIN HYDROCHLORIDE 0.4 MILLIGRAM(S): 0.4 CAPSULE ORAL at 21:42

## 2018-05-03 RX ADMIN — HEPARIN SODIUM 5000 UNIT(S): 5000 INJECTION INTRAVENOUS; SUBCUTANEOUS at 05:52

## 2018-05-03 RX ADMIN — Medication 40 MILLIGRAM(S): at 05:52

## 2018-05-03 RX ADMIN — Medication 90 MILLIGRAM(S): at 13:08

## 2018-05-03 RX ADMIN — Medication 100 MILLIGRAM(S): at 05:51

## 2018-05-03 RX ADMIN — PIPERACILLIN AND TAZOBACTAM 25 GRAM(S): 4; .5 INJECTION, POWDER, LYOPHILIZED, FOR SOLUTION INTRAVENOUS at 00:06

## 2018-05-03 NOTE — BRIEF OPERATIVE NOTE - PROCEDURE
<<-----Click on this checkbox to enter Procedure Exploration of artery  05/03/2018    Active  [id number]

## 2018-05-03 NOTE — PROGRESS NOTE ADULT - ASSESSMENT
A/P: 63M s/p L fem bk pop pt bypass 3/29, readmitted for A/P: 63M s/p L fem bk pop pt bypass 3/29, readmitted for 6cm breakdown in below knee incision.    -f/u am cbc  -plan for washout on saturday  -reg diet  -home meds  -dvt ppx  -IS A/P: 63M s/p L fem bk pop pt bypass 3/29, readmitted for 6cm breakdown in below knee incision.      ct s/o for lle bypass  blowout from infection  will proceed w lle wound exploration and  eval possible bypass ligation  this is a emergency   above d/w pt  including risks and benefits  pt consents

## 2018-05-03 NOTE — CONSULT NOTE ADULT - SUBJECTIVE AND OBJECTIVE BOX
Patient is a 63y old  Male who presents with a chief complaint of RIGHT LEG SURGICAL SITE BLEEDING (02 May 2018 23:37)      HPI:  62yo M with hx DM, CKD, HTN, R BKA, recent admission for left fem to below-knee pop bypass with RSVG (March 2018), now sent in from rehab with dehiscence of his lower extremity wound spanning approximately 4-5cm. He recently followed up with Dr. Crump in the office at which point his staples were removed and steristrips were applied. At rehab, he was engaging in physical therapy. It is unclear at what point the wound opened up (pt poor historian). Denies fevers/chills, no pain in the leg. (02 May 2018 21:43)    Patient at time of interview denies pain. Breathing is "very good." Denies chest pain. Denies other complaints.     History limited due to: [ ] Dementia  [ ] Delirium  [ ] Condition	    Function: [ ] Independent  [ ] Assistance  [ ] Total care  [ ] Non-ambulatory    Allergies    No Known Allergies    Intolerances        HOME MEDICATIONS: [ ] Reviewed    MEDICATIONS  (STANDING):  aspirin enteric coated 81 milliGRAM(s) Oral daily  dextrose 5%. 1000 milliLiter(s) (50 mL/Hr) IV Continuous <Continuous>  dextrose 50% Injectable 12.5 Gram(s) IV Push once  dextrose 50% Injectable 25 Gram(s) IV Push once  dextrose 50% Injectable 25 Gram(s) IV Push once  furosemide    Tablet 40 milliGRAM(s) Oral two times a day  heparin  Injectable 5000 Unit(s) SubCutaneous every 8 hours  insulin lispro (HumaLOG) corrective regimen sliding scale   SubCutaneous three times a day before meals  insulin lispro (HumaLOG) corrective regimen sliding scale   SubCutaneous at bedtime  labetalol 100 milliGRAM(s) Oral two times a day  NIFEdipine XL 90 milliGRAM(s) Oral daily  piperacillin/tazobactam IVPB. 3.375 Gram(s) IV Intermittent every 12 hours  tamsulosin 0.4 milliGRAM(s) Oral at bedtime  vancomycin  IVPB 750 milliGRAM(s) IV Intermittent every 24 hours  vancomycin  IVPB        MEDICATIONS  (PRN):  dextrose Gel 1 Dose(s) Oral once PRN Blood Glucose LESS THAN 70 milliGRAM(s)/deciliter  glucagon  Injectable 1 milliGRAM(s) IntraMuscular once PRN Glucose LESS THAN 70 milligrams/deciliter      PAST MEDICAL & SURGICAL HISTORY:  Kidney disease  HTN (hypertension)  DM (diabetes mellitus)  H/O peripheral artery bypass: left fem to below-knee pop with RSVG  S/P BKA (below knee amputation) unilateral, right  [ ] Reviewed     SOCIAL HISTORY:   Residence: [ ] [facility]  [ ] SNF  [ ] Community  [ ] Substance abuse:   [ ] Tobacco:   [ ] Alcohol use:     FAMILY HISTORY:  Family history of diabetes mellitus (Mother)  [ ] No pertinent family history in first degree relatives     REVIEW OF SYSTEMS:    [ x ] All other ROS negative  [  ] Unable to obtain due to poor mental status    Vital Signs Last 24 Hrs  T(C): 36.3 (03 May 2018 14:21), Max: 37.1 (02 May 2018 20:45)  T(F): 97.4 (03 May 2018 14:21), Max: 98.8 (02 May 2018 20:45)  HR: 75 (03 May 2018 14:21) (66 - 83)  BP: 118/60 (03 May 2018 14:21) (113/55 - 232/82)  BP(mean): --  RR: 16 (03 May 2018 14:21) (16 - 20)  SpO2: 100% (03 May 2018 14:21) (99% - 100%)    PHYSICAL EXAM:    GENERAL: thin middle-aged man lying in bed in NAD  HEAD:  Atraumatic, Normocephalic  EYES: EOMI, PERRLA, conjunctiva and sclera clear  ENMT: Moist mucous membranes  NECK: Supple, No JVD  RESPIRATORY: Clear to auscultation bilaterally  CARDIOVASCULAR: Regular rate and rhythm, no LE edema  GASTROINTESTINAL: Soft, Nontender, Nondistended  GENITOURINARY: Not examined  NERVOUS SYSTEM:  Moving all 4 extremities    LABS:                        7.3    5.96  )-----------( 269      ( 03 May 2018 05:40 )             22.5     05-03    135  |  100  |  46<H>  ----------------------------<  92  4.0   |  22  |  3.07<H>    Ca    7.7<L>      03 May 2018 05:40  Phos  4.1     05-03  Mg     1.9     05-03    TPro  7.3  /  Alb  2.7<L>  /  TBili  0.3  /  DBili  x   /  AST  29  /  ALT  27  /  AlkPhos  117  05-02        CAPILLARY BLOOD GLUCOSE      POCT Blood Glucose.: 158 mg/dL (03 May 2018 14:19)      RADIOLOGY & ADDITIONAL STUDIES:    EKG:   Personally Reviewed:  [ ] YES     Imaging:   Personally Reviewed:  [ x ] YES     < from: Xray Chest 1 View- PORTABLE-Urgent (05.03.18 @ 11:24) >    IMPRESSION:  Clear lungs.    < end of copied text >                Consultant(s) notes reviewed:  vascular surgery    Care Discussed with Consultant(s)/Other Providers:    Advanced Directives: [ ] DNR  [ ] No feeding tube  [ ] MOLST in chart  [ ] MOLST completed today  [ ] Unknown

## 2018-05-03 NOTE — CONSULT NOTE ADULT - ASSESSMENT
62yo M with hx DM, CKD, HTN, R BKA, recent admission for left fem to below-knee pop bypass with RSVG (March 2018) after  Eeypv-beck-wanucxenp angiography and Left leg angiography showing Ostial left anterior tibial: 100% stenosis. Proximal left anterior tibial: 100 % stenosis. Mid left anterior tibial: 100 % stenosis. Distal left anterior tibial: 100 % stenosis. dorsalis pedis artery is occluded, now sent in from rehab with dehiscence of his lower extremity wound spanning approximately 4-5cm.     Of note, patient's prior hospitalization was notable for HFpEF after receiving blood product which was treated with diuresis. 62yo M with hx DM, CKD, HTN, R BKA, recent admission for left fem to below-knee pop bypass with RSVG (March 2018) presenting from rehab with dehiscence. Consult called for cardiac optimization    #PAD s/p bypass c/b wound dehiscence. Pt had a CT of LE today concerning for posterior compartment hematoma which requires urgent surgery. Patient should proceed with operation at this time.    Please note on prior admission he had HF induced by blood transfusions. Transfuse cautiously and may need additional diuresis.    SHANT Boothe MD

## 2018-05-03 NOTE — CONSULT NOTE ADULT - SUBJECTIVE AND OBJECTIVE BOX
Chief Complaint: Wound Dehiscence    HPI:  62yo M with hx DM, CKD, HTN, R BKA, recent admission for left fem to below-knee pop bypass with RSVG (2018) after  Wnzod-atku-kdbuxwlte angiography and Left leg angiography showing Ostial left anterior tibial: 100% stenosis. Proximal left anterior tibial: 100 % stenosis. Mid left anterior tibial: 100 % stenosis. Distal left anterior tibial: 100 % stenosis. dorsalis pedis artery is occluded, now sent in from rehab with dehiscence of his lower extremity wound spanning approximately 4-5cm. He recently followed up with Dr. Crump in the office at which point his staples were removed and steristrips were applied. At rehab, he was engaging in physical therapy. It is unclear at what point the wound opened up (pt poor historian). Denies fevers/chills, no pain in the leg.     PMH:   Kidney disease  HTN (hypertension)  DM (diabetes mellitus)    PSH:   H/O peripheral artery bypass  S/P BKA (below knee amputation) unilateral, right  No significant past surgical history    Medications:   aspirin enteric coated 81 milliGRAM(s) Oral daily  dextrose 5%. 1000 milliLiter(s) IV Continuous <Continuous>  dextrose 50% Injectable 12.5 Gram(s) IV Push once  dextrose 50% Injectable 25 Gram(s) IV Push once  dextrose 50% Injectable 25 Gram(s) IV Push once  dextrose Gel 1 Dose(s) Oral once PRN  furosemide    Tablet 40 milliGRAM(s) Oral two times a day  glucagon  Injectable 1 milliGRAM(s) IntraMuscular once PRN  heparin  Injectable 5000 Unit(s) SubCutaneous every 8 hours  insulin lispro (HumaLOG) corrective regimen sliding scale   SubCutaneous three times a day before meals  insulin lispro (HumaLOG) corrective regimen sliding scale   SubCutaneous at bedtime  labetalol 100 milliGRAM(s) Oral two times a day  NIFEdipine XL 90 milliGRAM(s) Oral daily  piperacillin/tazobactam IVPB. 3.375 Gram(s) IV Intermittent every 12 hours  tamsulosin 0.4 milliGRAM(s) Oral at bedtime  vancomycin  IVPB 750 milliGRAM(s) IV Intermittent every 24 hours  vancomycin  IVPB        Allergies:  No Known Allergies    FAMILY HISTORY:  Family history of diabetes mellitus (Mother)    Social History:  Smoking: no  Alcohol: no  Drugs: no    Review of Systems:  REVIEW OF SYSTEMS:    CONSTITUTIONAL: No weakness, fevers or chills  EYES/ENT: No visual changes;  No dysphagia  NECK: No pain or stiffness  RESPIRATORY: No cough, wheezing, hemoptysis; No shortness of breath  CARDIOVASCULAR: No chest pain or palpitations; No lower extremity edema  GASTROINTESTINAL: No abdominal or epigastric pain. No nausea, vomiting, or hematemesis; No diarrhea or constipation. No melena or hematochezia.  BACK: No back pain  GENITOURINARY: No dysuria, frequency or hematuria  NEUROLOGICAL: No numbness or weakness  SKIN: No itching, burning, rashes, or lesions   All other review of systems is negative unless indicated above.  [x ] 10 point review of systems is otherwise negative except as mentioned above            [ ]Unable to obtain    Physical Exam:  T(C): 36.8 (18 @ 09:58), Max: 37.1 (18 @ 20:45)  HR: 72 (18 @ 09:58) (66 - 83)  BP: 113/55 (18 @ 09:58) (113/55 - 232/82)  RR: 18 (18 @ 09:58) (16 - 20)  SpO2: 100% (18 @ 09:58) (99% - 100%)  Wt(kg): --  GENERAL: No acute distress, well-developed  HEAD:  Atraumatic, Normocephalic  ENT: EOMI, PERRLA, conjunctiva and sclera clear, Neck supple, No JVD, moist mucosa  CHEST/LUNG: bibasilar crackles   BACK: No spinal tenderness  HEART: Regular rate and rhythm; + murmur radiating to carotids  ABDOMEN: Soft, Nontender, Nondistended; Bowel sounds present  EXTREMITIES:  No clubbing, cyanosis, or edema  PSYCH: Nl behavior, nl affect  NEUROLOGY: AAOx3, non-focal, cranial nerves intact  SKIN: wound site dressing CDI       @ 07:01  -   @ 07:00  --------------------------------------------------------  IN: 100 mL / OUT: 200 mL / NET: -100 mL     @ 07:01  -   @ 12:58  --------------------------------------------------------  IN: 0 mL / OUT: 200 mL / NET: -200 mL      Daily     Daily Weight in k.7 (03 May 2018 05:39)    Cardiovascular Diagnostic Testing:  ECG:    Echo: < from: Transthoracic Echocardiogram (18 @ 11:03) >  Study Date: 3/2/2018  Location: [id number]: Porter Garcia  Study quality: Technically Difficult  Referring Physician: Raffaele Garcia MD  Blood Pressure: 175/75 mmHg  Height: 167 cm  Weight: 75 kg  BSA: 1.8 m2  ------------------------------------------------------------------------  PROCEDURE: Transthoracic echocardiogram with 2-D, M-Mode  and complete spectral and color flow Doppler.  INDICATION: Heart failure, unspecified (I50.9)  ------------------------------------------------------------------------  DIMENSIONS:  Dimensions:     Normal Values:  LA:     4.2 cm    2.0 - 4.0 cm  Ao:     2.9 cm    2.0 - 3.8 cm  SEPTUM: 0.6 cm    0.6 - 1.2 cm  PWT:    0.7 cm    0.6 - 1.1 cm  LVIDd:  5.0 cm    3.0 - 5.6 cm  LVIDs:  3.1 cm    1.8 - 4.0 cm  Derived Variables:  LVMI: 57 g/m2  RWT: 0.28  Fractional short: 38 %  Ejection Fraction (Teicholtz): 68 %  ------------------------------------------------------------------------  OBSERVATIONS:  Mitral Valve: Mitral annular calcification, otherwise  normal mitral valve. Mild mitral regurgitation.  Aortic Root: Normal aortic root.  Aortic Valve: Aortic valve leaflet morphology not well  visualized. Peaktransaortic valve gradient equals 19 mm  Hg, mean transaortic valve gradient equals 11 mm Hg,  consistent with probable mild aortic stenosis. Minimal  aortic regurgitation.  Left Atrium: Normal left atrium.  Left Ventricle: Endocardium not well visualized; grossly  normal left ventricular systolic function. Normal left  ventricular internal dimensions and wall thicknesses.  Right Heart: Normal right atrium. The right ventricle is  not well visualized; grossly normal right ventricular  systolic function. Normal tricuspid valve.  Mild-moderate  tricuspid regurgitation. Normal pulmonic valve.  Pericardium/PleuraNormal pericardium with no pericardial  effusion. Bilateral pleural effusions.  Hemodynamic: Estimated right ventricular systolic pressure  equals 57 mm Hg, assuming right atrial pressure equals 10  mm Hg, consistent with moderate pulmonary hypertension.  ------------------------------------------------------------------------  CONCLUSIONS:  1. Mitral annular calcification, otherwise normal mitral  valve. Mild mitral regurgitation.  2. Aortic valve leaflet morphology not well visualized.  Peak transaortic valve gradient equals 19 mm Hg, mean  transaortic valve gradient equals 11 mm Hg, consistent with  probable mild aortic stenosis. Minimal aortic  regurgitation.  3. Normal left ventricular internal dimensions and wall  thicknesses.  4. Endocardium not well visualized; grossly normal left  ventricular systolic function.  5. The right ventricle is not well visualized; grossly  normal right ventricular systolic function.  6. Estimated right ventricular systolic pressure equals 57  mm Hg, assuming right atrial pressure equals 10 mm Hg,  consistent with moderate pulmonary hypertension.  7. Bilateral pleural effusions.  ------------------------------------------------------------------------  Confirmed on  3/2/2018 - 12:20:40 by Reynold Jackson M.D.  ------------------------------------------------------------------------    < end of copied text >      Stress Testing:    Cath:    Interpretation of Telemetry:    Imaging:    Labs:                        7.3    5.96  )-----------( 269      ( 03 May 2018 05:40 )             22.5     -    135  |  100  |  46<H>  ----------------------------<  92  4.0   |  22  |  3.07<H>    Ca    7.7<L>      03 May 2018 05:40  Phos  4.1       Mg     1.9         TPro  7.3  /  Alb  2.7<L>  /  TBili  0.3  /  DBili  x   /  AST  29  /  ALT  27  /  AlkPhos  117  0502              Hemoglobin A1C, Whole Blood: 5.5 % ( @ 05:40)

## 2018-05-03 NOTE — CONSULT NOTE ADULT - ASSESSMENT
64 y/o man with HTN, DM2, CKD stage 4, PAD s/p R BKA, recent L femoral to below knee bypass with RSVG 3/2018, course c/b acute on chronic diastoliC CHF, sent in from rehab with LLE wound dehiscence. Medicine consulted for co-management.    *LLE wound dehiscence  - management per primary team  - possible OR on Saturday  - RCRI 2 - 6.6% risk of monica-op MACE  - would cont aspirin, beta blocker, statin perioperatively  - h/o CHF however currently appears euvolemic  - cardiology consulted, f/u recs  - if no objection from cardiology, patient is medically optimized for surgery    *CV: HTN, h/o diastolic CHF (chronic), PAD  - euvolemic on exam  - BP currently at goal  - cont Lasix 40 mg BID  - cont nifedipine XL 90 mg daily, labetalol 100 mg BID  - cont aspirin, statin  - monitor BP    *DM2: A1c 5.5 - may be too aggressively controlled  - continue correction scale insulin  - monitor fingersticks    *CKD stage 4: Cr at baseline  - renally dose all medications  - avoid nephrotoxic agents  - monitor Cr    Thanks for the consult. Hospitalist will follow with you.

## 2018-05-03 NOTE — BRIEF OPERATIVE NOTE - OPERATION/FINDINGS
Left lower extremity hematoma exploration.  Ligation of below the knee popliteal artery (distal to distal anastomosis of fem-pop bypass) Left lower extremity hematoma exploration.  Ligation of below the knee popliteal artery (distal to distal anastomosis of fem-pop bypass)..    Dopplerable PT signal

## 2018-05-03 NOTE — PROGRESS NOTE ADULT - SUBJECTIVE AND OBJECTIVE BOX
S: patient admitted for LLE wound breakdown with extensive clot at base, no acute events overnight, pt seen and examined.    O;  Vital Signs Last 24 Hrs  T(C): 37 (03 May 2018 01:05), Max: 37.1 (02 May 2018 20:45)  T(F): 98.6 (03 May 2018 01:05), Max: 98.8 (02 May 2018 20:45)  HR: 80 (03 May 2018 01:05) (58 - 83)  BP: 124/63 (03 May 2018 01:05) (119/61 - 232/82)  RR: 18 (03 May 2018 01:05) (16 - 20)  SpO2: 100% (03 May 2018 01:05) (100% - 100%)     Gen: NAD  Chest: symmetric chest rise no increased work of breathing  LLE: incision with below knee area  of breakdown and clot at base      LABS:                         6.7    7.94  )-----------( 303      ( 02 May 2018 14:07 )             21.0     05-02    134<L>  |  97<L>  |  46<H>  ----------------------------<  132<H>  4.5   |  22  |  3.10<H>    Ca    8.1<L>      02 May 2018 14:07    TPro  7.3  /  Alb  2.7<L>  /  TBili  0.3  /  DBili  x   /  AST  29  /  ALT  27  /  AlkPhos  117  05-02 S: patient admitted for LLE wound breakdown with extensive clot at base, no acute events overnight, pt seen and examined.    O;  Vital Signs Last 24 Hrs  T(C): 37 (03 May 2018 01:05), Max: 37.1 (02 May 2018 20:45)  T(F): 98.6 (03 May 2018 01:05), Max: 98.8 (02 May 2018 20:45)  HR: 80 (03 May 2018 01:05) (58 - 83)  BP: 124/63 (03 May 2018 01:05) (119/61 - 232/82)  RR: 18 (03 May 2018 01:05) (16 - 20)  SpO2: 100% (03 May 2018 01:05) (100% - 100%)     Gen: NAD  Chest: symmetric chest rise no increased work of breathing  LLE: incision with below knee area  of breakdown and clot at base, no active bleeding       LABS:                         6.7    7.94  )-----------( 303      ( 02 May 2018 14:07 )             21.0     05-02    134<L>  |  97<L>  |  46<H>  ----------------------------<  132<H>  4.5   |  22  |  3.10<H>    Ca    8.1<L>      02 May 2018 14:07    TPro  7.3  /  Alb  2.7<L>  /  TBili  0.3  /  DBili  x   /  AST  29  /  ALT  27  /  AlkPhos  117  05-02    CT of LLE findings reviewed  s/o hematoma w possible blowout

## 2018-05-04 LAB
APTT BLD: 39.1 SEC — HIGH (ref 27.5–37.4)
BLD GP AB SCN SERPL QL: NEGATIVE — SIGNIFICANT CHANGE UP
BUN SERPL-MCNC: 48 MG/DL — HIGH (ref 7–23)
CALCIUM SERPL-MCNC: 7.7 MG/DL — LOW (ref 8.4–10.5)
CHLORIDE SERPL-SCNC: 99 MMOL/L — SIGNIFICANT CHANGE UP (ref 98–107)
CO2 SERPL-SCNC: 24 MMOL/L — SIGNIFICANT CHANGE UP (ref 22–31)
CREAT SERPL-MCNC: 3.04 MG/DL — HIGH (ref 0.5–1.3)
GLUCOSE BLDC GLUCOMTR-MCNC: 129 MG/DL — HIGH (ref 70–99)
GLUCOSE BLDC GLUCOMTR-MCNC: 163 MG/DL — HIGH (ref 70–99)
GLUCOSE BLDC GLUCOMTR-MCNC: 228 MG/DL — HIGH (ref 70–99)
GLUCOSE BLDC GLUCOMTR-MCNC: 258 MG/DL — HIGH (ref 70–99)
GLUCOSE SERPL-MCNC: 154 MG/DL — HIGH (ref 70–99)
HCT VFR BLD CALC: 26.6 % — LOW (ref 39–50)
HGB BLD-MCNC: 8.7 G/DL — LOW (ref 13–17)
INR BLD: 1.04 — SIGNIFICANT CHANGE UP (ref 0.88–1.17)
MAGNESIUM SERPL-MCNC: 1.9 MG/DL — SIGNIFICANT CHANGE UP (ref 1.6–2.6)
MCHC RBC-ENTMCNC: 27.4 PG — SIGNIFICANT CHANGE UP (ref 27–34)
MCHC RBC-ENTMCNC: 32.7 % — SIGNIFICANT CHANGE UP (ref 32–36)
MCV RBC AUTO: 83.6 FL — SIGNIFICANT CHANGE UP (ref 80–100)
NRBC # FLD: 0 — SIGNIFICANT CHANGE UP
PHOSPHATE SERPL-MCNC: 3.6 MG/DL — SIGNIFICANT CHANGE UP (ref 2.5–4.5)
PLATELET # BLD AUTO: 315 K/UL — SIGNIFICANT CHANGE UP (ref 150–400)
PMV BLD: 10.3 FL — SIGNIFICANT CHANGE UP (ref 7–13)
POTASSIUM SERPL-MCNC: 4.7 MMOL/L — SIGNIFICANT CHANGE UP (ref 3.5–5.3)
POTASSIUM SERPL-SCNC: 4.7 MMOL/L — SIGNIFICANT CHANGE UP (ref 3.5–5.3)
PROTHROM AB SERPL-ACNC: 11.6 SEC — SIGNIFICANT CHANGE UP (ref 9.8–13.1)
RBC # BLD: 3.18 M/UL — LOW (ref 4.2–5.8)
RBC # FLD: 16.1 % — HIGH (ref 10.3–14.5)
RH IG SCN BLD-IMP: POSITIVE — SIGNIFICANT CHANGE UP
SODIUM SERPL-SCNC: 136 MMOL/L — SIGNIFICANT CHANGE UP (ref 135–145)
VANCOMYCIN TROUGH SERPL-MCNC: 11.3 UG/ML — SIGNIFICANT CHANGE UP (ref 10–20)
WBC # BLD: 7.94 K/UL — SIGNIFICANT CHANGE UP (ref 3.8–10.5)
WBC # FLD AUTO: 7.94 K/UL — SIGNIFICANT CHANGE UP (ref 3.8–10.5)

## 2018-05-04 PROCEDURE — 99233 SBSQ HOSP IP/OBS HIGH 50: CPT

## 2018-05-04 RX ORDER — ACETAMINOPHEN 500 MG
650 TABLET ORAL EVERY 6 HOURS
Refills: 0 | Status: DISCONTINUED | OUTPATIENT
Start: 2018-05-04 | End: 2018-05-11

## 2018-05-04 RX ADMIN — Medication 81 MILLIGRAM(S): at 11:58

## 2018-05-04 RX ADMIN — Medication 40 MILLIGRAM(S): at 17:19

## 2018-05-04 RX ADMIN — PIPERACILLIN AND TAZOBACTAM 25 GRAM(S): 4; .5 INJECTION, POWDER, LYOPHILIZED, FOR SOLUTION INTRAVENOUS at 05:55

## 2018-05-04 RX ADMIN — Medication 2: at 22:24

## 2018-05-04 RX ADMIN — Medication 40 MILLIGRAM(S): at 05:50

## 2018-05-04 RX ADMIN — Medication 100 MILLIGRAM(S): at 21:21

## 2018-05-04 RX ADMIN — MORPHINE SULFATE 2 MILLIGRAM(S): 50 CAPSULE, EXTENDED RELEASE ORAL at 15:39

## 2018-05-04 RX ADMIN — HEPARIN SODIUM 5000 UNIT(S): 5000 INJECTION INTRAVENOUS; SUBCUTANEOUS at 13:00

## 2018-05-04 RX ADMIN — HEPARIN SODIUM 5000 UNIT(S): 5000 INJECTION INTRAVENOUS; SUBCUTANEOUS at 21:21

## 2018-05-04 RX ADMIN — PIPERACILLIN AND TAZOBACTAM 25 GRAM(S): 4; .5 INJECTION, POWDER, LYOPHILIZED, FOR SOLUTION INTRAVENOUS at 17:19

## 2018-05-04 RX ADMIN — Medication 2: at 09:09

## 2018-05-04 RX ADMIN — MORPHINE SULFATE 2 MILLIGRAM(S): 50 CAPSULE, EXTENDED RELEASE ORAL at 15:29

## 2018-05-04 RX ADMIN — Medication 100 MILLIGRAM(S): at 10:20

## 2018-05-04 RX ADMIN — HEPARIN SODIUM 5000 UNIT(S): 5000 INJECTION INTRAVENOUS; SUBCUTANEOUS at 05:50

## 2018-05-04 RX ADMIN — Medication 90 MILLIGRAM(S): at 05:55

## 2018-05-04 RX ADMIN — Medication 650 MILLIGRAM(S): at 11:03

## 2018-05-04 RX ADMIN — TAMSULOSIN HYDROCHLORIDE 0.4 MILLIGRAM(S): 0.4 CAPSULE ORAL at 21:21

## 2018-05-04 RX ADMIN — Medication 150 MILLIGRAM(S): at 21:21

## 2018-05-04 RX ADMIN — Medication 650 MILLIGRAM(S): at 10:20

## 2018-05-04 RX ADMIN — Medication 4: at 12:57

## 2018-05-04 NOTE — PROGRESS NOTE ADULT - SUBJECTIVE AND OBJECTIVE BOX
Patient seen and examined at bedside.    Overnight Events:     Review of Systems:  REVIEW OF SYSTEMS:    CONSTITUTIONAL: No weakness, fevers or chills  EYES/ENT: No visual changes;  No dysphagia  NECK: No pain or stiffness  RESPIRATORY: No cough, wheezing, hemoptysis; No shortness of breath  CARDIOVASCULAR: No chest pain or palpitations; No lower extremity edema  GASTROINTESTINAL: No abdominal or epigastric pain. No nausea, vomiting, or hematemesis; No diarrhea or constipation. No melena or hematochezia.  BACK: No back pain  GENITOURINARY: No dysuria, frequency or hematuria  NEUROLOGICAL: No numbness or weakness  SKIN: No itching, burning, rashes, or lesions   All other review of systems is negative unless indicated above.            Medications:  aspirin enteric coated 81 milliGRAM(s) Oral daily  dextrose 5%. 1000 milliLiter(s) IV Continuous <Continuous>  dextrose 50% Injectable 12.5 Gram(s) IV Push once  dextrose 50% Injectable 25 Gram(s) IV Push once  dextrose 50% Injectable 25 Gram(s) IV Push once  dextrose Gel 1 Dose(s) Oral once PRN  furosemide    Tablet 40 milliGRAM(s) Oral two times a day  glucagon  Injectable 1 milliGRAM(s) IntraMuscular once PRN  heparin  Injectable 5000 Unit(s) SubCutaneous every 8 hours  insulin lispro (HumaLOG) corrective regimen sliding scale   SubCutaneous three times a day before meals  insulin lispro (HumaLOG) corrective regimen sliding scale   SubCutaneous at bedtime  labetalol 100 milliGRAM(s) Oral two times a day  morphine  - Injectable 2 milliGRAM(s) IV Push every 4 hours PRN  morphine  - Injectable 4 milliGRAM(s) IV Push every 4 hours PRN  NIFEdipine XL 90 milliGRAM(s) Oral daily  piperacillin/tazobactam IVPB. 3.375 Gram(s) IV Intermittent every 12 hours  tamsulosin 0.4 milliGRAM(s) Oral at bedtime  vancomycin  IVPB 750 milliGRAM(s) IV Intermittent every 24 hours  vancomycin  IVPB          PAST MEDICAL & SURGICAL HISTORY:  Kidney disease  HTN (hypertension)  DM (diabetes mellitus)  H/O peripheral artery bypass: left fem to below-knee pop with RSVG  S/P BKA (below knee amputation) unilateral, right      Vitals:  T(F): 98.1 (05-04), Max: 98.3 (05-03)  HR: 77 (05-04) (67 - 79)  BP: 130/52 (05-04) (113/55 - 137/65)  RR: 17 (05-04)  SpO2: 96% (05-04)  I&O's Summary    03 May 2018 07:01  -  04 May 2018 07:00  --------------------------------------------------------  IN: 225 mL / OUT: 400 mL / NET: -175 mL    04 May 2018 07:01  -  04 May 2018 09:11  --------------------------------------------------------  IN: 0 mL / OUT: 200 mL / NET: -200 mL        Physical Exam:  Appearance: No acute distress; well appearing  Eyes: PERRL, EOMI, pink conjunctiva  HENT: Normal oral muscosa  Cardiovascular: RRR, S1, S2, no murmurs, rubs, or gallops; no edema; no JVD  Respiratory: Clear to auscultation bilaterally  Gastrointestinal: soft, non-tender, non-distended with normal bowel sounds  Musculoskeletal: No clubbing; no joint deformity   Neurologic: Non-focal  Lymphatic: No lymphadenopathy  Psychiatry: AAOx3, mood & affect appropriate  Skin: No rashes, ecchymoses, or cyanosis                          8.1    6.74  )-----------( 275      ( 03 May 2018 19:15 )             24.4     05-03    138  |  99  |  51<H>  ----------------------------<  115<H>  4.3   |  22  |  3.09<H>    Ca    7.9<L>      03 May 2018 19:15  Phos  4.1     05-03  Mg     1.9     05-03    TPro  7.3  /  Alb  2.7<L>  /  TBili  0.3  /  DBili  x   /  AST  29  /  ALT  27  /  AlkPhos  117  05-02    PT/INR - ( 03 May 2018 19:15 )   PT: 11.7 SEC;   INR: 1.02          PTT - ( 03 May 2018 19:15 )  PTT:27.3 SEC Overnight Events:   Patient tired, sleepy. No chest pain or SOB    Medications:  aspirin enteric coated 81 milliGRAM(s) Oral daily  dextrose 5%. 1000 milliLiter(s) IV Continuous <Continuous>  dextrose 50% Injectable 12.5 Gram(s) IV Push once  dextrose 50% Injectable 25 Gram(s) IV Push once  dextrose 50% Injectable 25 Gram(s) IV Push once  dextrose Gel 1 Dose(s) Oral once PRN  furosemide    Tablet 40 milliGRAM(s) Oral two times a day  glucagon  Injectable 1 milliGRAM(s) IntraMuscular once PRN  heparin  Injectable 5000 Unit(s) SubCutaneous every 8 hours  insulin lispro (HumaLOG) corrective regimen sliding scale   SubCutaneous three times a day before meals  insulin lispro (HumaLOG) corrective regimen sliding scale   SubCutaneous at bedtime  labetalol 100 milliGRAM(s) Oral two times a day  morphine  - Injectable 2 milliGRAM(s) IV Push every 4 hours PRN  morphine  - Injectable 4 milliGRAM(s) IV Push every 4 hours PRN  NIFEdipine XL 90 milliGRAM(s) Oral daily  piperacillin/tazobactam IVPB. 3.375 Gram(s) IV Intermittent every 12 hours  tamsulosin 0.4 milliGRAM(s) Oral at bedtime  vancomycin  IVPB 750 milliGRAM(s) IV Intermittent every 24 hours  vancomycin  IVPB          PAST MEDICAL & SURGICAL HISTORY:  Kidney disease  HTN (hypertension)  DM (diabetes mellitus)  H/O peripheral artery bypass: left fem to below-knee pop with RSVG  S/P BKA (below knee amputation) unilateral, right      Vitals:  T(F): 98.1 (05-04), Max: 98.3 (05-03)  HR: 77 (05-04) (67 - 79)  BP: 130/52 (05-04) (113/55 - 137/65)  RR: 17 (05-04)  SpO2: 96% (05-04)  I&O's Summary    03 May 2018 07:01  -  04 May 2018 07:00  --------------------------------------------------------  IN: 225 mL / OUT: 400 mL / NET: -175 mL    04 May 2018 07:01  -  04 May 2018 09:11  --------------------------------------------------------  IN: 0 mL / OUT: 200 mL / NET: -200 mL        Physical Exam:  GENERAL: No acute distress, well-developed  HEAD:  Atraumatic, Normocephalic  ENT: EOMI, PERRLA, conjunctiva and sclera clear, Neck supple, No JVD, moist mucosa  CHEST/LUNG: mild bibasilar crackles   BACK: No spinal tenderness  HEART: Regular rate and rhythm; + murmur radiating to carotids  ABDOMEN: Soft, Nontender, Nondistended; Bowel sounds present  EXTREMITIES:  No clubbing, cyanosis, or edema  PSYCH: Nl behavior, nl affect  NEUROLOGY: AAOx3, non-focal, cranial nerves intact  SKIN: wound site dressing CDI                          8.1    6.74  )-----------( 275      ( 03 May 2018 19:15 )             24.4     05-03    138  |  99  |  51<H>  ----------------------------<  115<H>  4.3   |  22  |  3.09<H>    Ca    7.9<L>      03 May 2018 19:15  Phos  4.1     05-03  Mg     1.9     05-03    TPro  7.3  /  Alb  2.7<L>  /  TBili  0.3  /  DBili  x   /  AST  29  /  ALT  27  /  AlkPhos  117  05-02    PT/INR - ( 03 May 2018 19:15 )   PT: 11.7 SEC;   INR: 1.02          PTT - ( 03 May 2018 19:15 )  PTT:27.3 SEC

## 2018-05-04 NOTE — PROGRESS NOTE ADULT - SUBJECTIVE AND OBJECTIVE BOX
ANESTHESIA POSTOP CHECK    63y Male POSTOP DAY 1 S/P     Vital Signs Last 24 Hrs  T(C): 37.2 (04 May 2018 10:02), Max: 37.2 (04 May 2018 10:02)  T(F): 98.9 (04 May 2018 10:02), Max: 98.9 (04 May 2018 10:02)  HR: 65 (04 May 2018 10:02) (65 - 79)  BP: 133/51 (04 May 2018 10:02) (118/60 - 137/65)  BP(mean): 69 (03 May 2018 23:00) (65 - 69)  RR: 18 (04 May 2018 10:02) (10 - 18)  SpO2: 100% (04 May 2018 10:02) (95% - 100%)  I&O's Summary    03 May 2018 07:01  -  04 May 2018 07:00  --------------------------------------------------------  IN: 225 mL / OUT: 400 mL / NET: -175 mL    04 May 2018 07:01  -  04 May 2018 10:06  --------------------------------------------------------  IN: 0 mL / OUT: 200 mL / NET: -200 mL        [x ] NO APPARENT ANESTHESIA COMPLICATIONS      Comments:

## 2018-05-04 NOTE — PROGRESS NOTE ADULT - SUBJECTIVE AND OBJECTIVE BOX
VASCULAR SURGERY AM PROGRESS NOTE    S: Patient seen and examined at bedside. Patient is s/p LLE hematoma exploration with below the knee popliteal artery ligation. No acute events overnight. Bleeding well controlled at this time.     O:  Vital Signs Last 24 Hrs  T(C): 36.7 (04 May 2018 05:48), Max: 36.8 (03 May 2018 09:58)  T(F): 98.1 (04 May 2018 05:48), Max: 98.3 (03 May 2018 09:58)  HR: 77 (04 May 2018 05:48) (67 - 79)  BP: 130/52 (04 May 2018 05:48) (113/55 - 137/65)  BP(mean): 69 (03 May 2018 23:00) (65 - 69)  RR: 17 (04 May 2018 05:48) (10 - 18)  SpO2: 96% (04 May 2018 05:48) (95% - 100%)      Physical Exam:  Gen: Laying in bed, no acute distress  Chest: symmetric chest rise no increased work of breathing  LLE: below the knee incision with packing, some oozing but no active bleed. + PT, DP, AT signals      LABS:     CBC (05-03 @ 19:15)                              8.1<L>                         6.74    )----------------(  275        --    % Neutrophils, --    % Lymphocytes, ANC: --                                  24.4<L>  CBC (05-03 @ 05:40)                              7.3<L>                         5.96    )----------------(  269        --    % Neutrophils, --    % Lymphocytes, ANC: --                                  22.5<L>    BMP (05-03 @ 19:15)             138     |  99      |  51<H> 		Ca++ --      Ca 7.9<L>             ---------------------------------( 115<H>		Mg --                 4.3     |  22      |  3.09<H>			Ph --      BMP (05-03 @ 05:40)             135     |  100     |  46<H> 		Ca++ --      Ca 7.7<L>             ---------------------------------( 92    		Mg 1.9                4.0     |  22      |  3.07<H>			Ph 4.1         Coags (05-03 @ 19:15)  aPTT 27.3<L> / INR 1.02 / PT 11.7 VASCULAR SURGERY AM PROGRESS NOTE    S: Patient seen and examined at bedside. Patient is s/p LLE hematoma exploration with below the knee popliteal artery ligation. No acute events overnight. Bleeding well controlled at this time.     O:  Vital Signs Last 24 Hrs  T(C): 36.7 (04 May 2018 05:48), Max: 36.8 (03 May 2018 09:58)  T(F): 98.1 (04 May 2018 05:48), Max: 98.3 (03 May 2018 09:58)  HR: 77 (04 May 2018 05:48) (67 - 79)  BP: 130/52 (04 May 2018 05:48) (113/55 - 137/65)  BP(mean): 69 (03 May 2018 23:00) (65 - 69)  RR: 17 (04 May 2018 05:48) (10 - 18)  SpO2: 96% (04 May 2018 05:48) (95% - 100%)      Physical Exam:  Gen: Laying in bed, no acute distress  Chest: symmetric chest rise no increased work of breathing  LLE: below the knee incision with packing, some oozing but no active bleed. + PT, DP, AT signals, lle distally warm well perfused       LABS:     CBC (05-03 @ 19:15)                              8.1<L>                         6.74    )----------------(  275        --    % Neutrophils, --    % Lymphocytes, ANC: --                                  24.4<L>  CBC (05-03 @ 05:40)                              7.3<L>                         5.96    )----------------(  269        --    % Neutrophils, --    % Lymphocytes, ANC: --                                  22.5<L>    BMP (05-03 @ 19:15)             138     |  99      |  51<H> 		Ca++ --      Ca 7.9<L>             ---------------------------------( 115<H>		Mg --                 4.3     |  22      |  3.09<H>			Ph --      BMP (05-03 @ 05:40)             135     |  100     |  46<H> 		Ca++ --      Ca 7.7<L>             ---------------------------------( 92    		Mg 1.9                4.0     |  22      |  3.07<H>			Ph 4.1         Coags (05-03 @ 19:15)  aPTT 27.3<L> / INR 1.02 / PT 11.7

## 2018-05-04 NOTE — PROGRESS NOTE ADULT - ASSESSMENT
64 y/o man with HTN, DM2, CKD stage 4, PAD s/p R BKA, recent L femoral to below knee bypass with RSVG 3/2018, course c/b acute on chronic diastoliC CHF, sent in from rehab with LLE wound dehiscence. Medicine consulted for co-management.    *LLE wound dehiscence, hematoma: s/p exploration, ligation of below the knee popliteal artery on 5/3  - management per primary vascular service  - f/u cultures  - pain control, bowel regimen  - PT    *CV: HTN, h/o diastolic CHF (chronic), PAD  - euvolemic on exam  - BP currently at goal  - cont Lasix 40 mg BID  - cont nifedipine XL 90 mg daily, labetalol 100 mg BID  - cont aspirin, statin  - monitor BP    *DM2: A1c 5.5 - may be too aggressively controlled  - continue correction scale insulin  - monitor fingersticks - currently mostly at goal  - if fingersticks persistently elevated, consider standing insulin    *CKD stage 4: Cr at baseline  - renally dose all medications  - avoid nephrotoxic agents  - monitor Cr    Thanks for the consult. Hospitalist will follow with you.

## 2018-05-04 NOTE — PROGRESS NOTE ADULT - ASSESSMENT
64yo M with hx DM, CKD, HTN, R BKA, recent admission for left fem to below-knee pop bypass with RSVG (March 2018) presenting from rehab with dehiscence. Consult called for cardiac optimization    #PAD s/p bypass c/b wound dehiscence. Pt had a CT of LE today concerning for posterior compartment hematoma which required urgent surgery, now s/p evacuation hematoma, ligation of below the knee popliteal artery on 5/3/18.     Please note on prior admission he had HF induced by blood transfusions. Transfuse cautiously and may need additional diuresis. 62yo M with hx DM, CKD, HTN, R BKA, recent admission for left fem to below-knee pop bypass with RSVG (March 2018) presenting from rehab with dehiscence. Consult called for cardiac optimization    #PAD s/p bypass c/b wound dehiscence. Pt had a CT of LE today concerning for posterior compartment hematoma which required urgent surgery, now s/p evacuation hematoma, ligation of below the knee popliteal artery on 5/3/18. Appears mostly euvolemic.    Please note on prior admission he had HF induced by blood transfusions. Transfuse cautiously and may need additional diuresis.

## 2018-05-04 NOTE — PROGRESS NOTE ADULT - ASSESSMENT
A/P: 63M s/p L fem bk pop pt bypass 3/29, readmitted for 6cm breakdown in below knee incision. Patient now s/p evacuation hematoma, ligation of below the knee popliteal artery on 5/3/18, hemodynamically stable.    - Continue regular diet  - Daily dressing changes to incision site with iodoform packing  - Betadine to toes with dressing changes  - Continue vascular checks q4h  - Continue to monitor H/H A/P: 63M s/p L fem bk pop pt bypass 3/29, readmitted for 6cm breakdown in below knee incision. Patient now s/p evacuation hematoma, ligation of below the knee popliteal artery on 5/3/18, hemodynamically stable.    - Continue regular diet  - Daily dressing changes to incision site with iodoform packing  - Betadine to toes with dressing changes  - Continue vascular checks q4h  - Continue to monitor H/H  f/u wound cx

## 2018-05-04 NOTE — PROGRESS NOTE ADULT - SUBJECTIVE AND OBJECTIVE BOX
CHIEF COMPLAINT: Patient is a 63y old  male who presents with a chief complaint of RIGHT LEG SURGICAL SITE BLEEDING (02 May 2018 23:37)      SUBJECTIVE / OVERNIGHT EVENTS:    No complaints. Denies pain. Denies SOB.    MEDICATIONS  (STANDING):  aspirin enteric coated 81 milliGRAM(s) Oral daily  dextrose 5%. 1000 milliLiter(s) (50 mL/Hr) IV Continuous <Continuous>  dextrose 50% Injectable 12.5 Gram(s) IV Push once  dextrose 50% Injectable 25 Gram(s) IV Push once  dextrose 50% Injectable 25 Gram(s) IV Push once  furosemide    Tablet 40 milliGRAM(s) Oral two times a day  heparin  Injectable 5000 Unit(s) SubCutaneous every 8 hours  insulin lispro (HumaLOG) corrective regimen sliding scale   SubCutaneous three times a day before meals  insulin lispro (HumaLOG) corrective regimen sliding scale   SubCutaneous at bedtime  labetalol 100 milliGRAM(s) Oral two times a day  NIFEdipine XL 90 milliGRAM(s) Oral daily  piperacillin/tazobactam IVPB. 3.375 Gram(s) IV Intermittent every 12 hours  tamsulosin 0.4 milliGRAM(s) Oral at bedtime  vancomycin  IVPB 750 milliGRAM(s) IV Intermittent every 24 hours  vancomycin  IVPB        MEDICATIONS  (PRN):  acetaminophen   Tablet. 650 milliGRAM(s) Oral every 6 hours PRN Mild Pain (1 - 3)  dextrose Gel 1 Dose(s) Oral once PRN Blood Glucose LESS THAN 70 milliGRAM(s)/deciliter  glucagon  Injectable 1 milliGRAM(s) IntraMuscular once PRN Glucose LESS THAN 70 milligrams/deciliter  morphine  - Injectable 2 milliGRAM(s) IV Push every 4 hours PRN Moderate Pain (4 - 6)  morphine  - Injectable 4 milliGRAM(s) IV Push every 4 hours PRN Severe Pain (7 - 10)      VITALS:  T(F): 99 (05-04-18 @ 14:17), Max: 99 (05-04-18 @ 14:17)  HR: 78 (05-04-18 @ 14:17) (65 - 79)  BP: 136/41 (05-04-18 @ 14:17) (119/48 - 137/65)  RR: 16 (05-04-18 @ 14:17) (10 - 18)  SpO2: 96% (05-04-18 @ 14:17)  Height (cm): 167.64 (15:02)  Weight (kg): 68 (15:02)  BMI (kg/m2): 24.2 (15:02)    CAPILLARY BLOOD GLUCOSE    Output   Height (cm): 167.64 (15:02)  Weight (kg): 68 (15:02)  BMI (kg/m2): 24.2 (15:02)  I&O's Summary  T(F): 99 (05-04-18 @ 14:17), Max: 99 (05-04-18 @ 14:17)  HR: 78 (05-04-18 @ 14:17) (65 - 79)  BP: 136/41 (05-04-18 @ 14:17) (119/48 - 137/65)  RR: 16 (05-04-18 @ 14:17) (10 - 18)  SpO2: 96% (05-04-18 @ 14:17)    PHYSICAL EXAM:    GENERAL: thin middle-aged man lying in bed in NAD  HEAD:  Atraumatic, Normocephalic  EYES: EOMI, PERRLA, conjunctiva and sclera clear  ENMT: Moist mucous membranes  NECK: Supple, No JVD  RESPIRATORY: Clear to auscultation bilaterally  CARDIOVASCULAR: Regular rate and rhythm, no LE edema  GASTROINTESTINAL: Soft, Nontender, Nondistended  Ext: LLE in wrap, dressings  GENITOURINARY: Not examined  NERVOUS SYSTEM:  Moving all 4 extremities    LABS:              8.7                  136  | 24   | 48           7.94  >-----------< 315     ------------------------< 154                   26.6                 4.7  | 99   | 3.04                                         Ca 7.7   Mg 1.9   Ph 3.6          INR: 1.04 ;    PT: 11.6 SEC;    PTT: 39.1 SEC<H>                MICROBIOLOGY:    Culture - Surg Site Aerob/Anaer w/Gm St (collected 03 May 2018 18:45)  Source: OTHER          RADIOLOGY & ADDITIONAL TESTS:    Imaging Personally Reviewed:    < from: CT Lower Extremity No Cont, Left (05.03.18 @ 11:46) >  IMPRESSION:  Approximately 13.4 cm x 6.7 cm x 5.1 cm hematoma collection in deep   posterior compartment of upper calf subjacent to an upper medial calf   soft tissue defect representing site of wound dehiscence. In addition,   possibility of dehiscent distal vascular anastomotic site cannot be   excluded. Grossly unremarkable appearing proximal anastomotic site in   left groin region.    Circumferential thigh and calf subcutaneous swelling which could be   compatible with edema and/or inflammatory reaction/cellulitis. No   tracking gas collections or CT evidence for osteomyelitis.    < end of copied text >      [x] Consultant(s) Notes Reviewed: vascular    [x] Care Discussed with Consultants/Other Providers: vascular

## 2018-05-05 LAB
BUN SERPL-MCNC: 52 MG/DL — HIGH (ref 7–23)
CALCIUM SERPL-MCNC: 7.6 MG/DL — LOW (ref 8.4–10.5)
CHLORIDE SERPL-SCNC: 99 MMOL/L — SIGNIFICANT CHANGE UP (ref 98–107)
CO2 SERPL-SCNC: 23 MMOL/L — SIGNIFICANT CHANGE UP (ref 22–31)
CREAT SERPL-MCNC: 3.55 MG/DL — HIGH (ref 0.5–1.3)
GLUCOSE BLDC GLUCOMTR-MCNC: 102 MG/DL — HIGH (ref 70–99)
GLUCOSE BLDC GLUCOMTR-MCNC: 103 MG/DL — HIGH (ref 70–99)
GLUCOSE BLDC GLUCOMTR-MCNC: 168 MG/DL — HIGH (ref 70–99)
GLUCOSE BLDC GLUCOMTR-MCNC: 94 MG/DL — SIGNIFICANT CHANGE UP (ref 70–99)
GLUCOSE SERPL-MCNC: 90 MG/DL — SIGNIFICANT CHANGE UP (ref 70–99)
HCT VFR BLD CALC: 24.7 % — LOW (ref 39–50)
HGB BLD-MCNC: 8 G/DL — LOW (ref 13–17)
MAGNESIUM SERPL-MCNC: 1.9 MG/DL — SIGNIFICANT CHANGE UP (ref 1.6–2.6)
MCHC RBC-ENTMCNC: 27.5 PG — SIGNIFICANT CHANGE UP (ref 27–34)
MCHC RBC-ENTMCNC: 32.4 % — SIGNIFICANT CHANGE UP (ref 32–36)
MCV RBC AUTO: 84.9 FL — SIGNIFICANT CHANGE UP (ref 80–100)
NRBC # FLD: 0 — SIGNIFICANT CHANGE UP
PHOSPHATE SERPL-MCNC: 4 MG/DL — SIGNIFICANT CHANGE UP (ref 2.5–4.5)
PLATELET # BLD AUTO: 264 K/UL — SIGNIFICANT CHANGE UP (ref 150–400)
PMV BLD: 10.3 FL — SIGNIFICANT CHANGE UP (ref 7–13)
POTASSIUM SERPL-MCNC: 4.5 MMOL/L — SIGNIFICANT CHANGE UP (ref 3.5–5.3)
POTASSIUM SERPL-SCNC: 4.5 MMOL/L — SIGNIFICANT CHANGE UP (ref 3.5–5.3)
RBC # BLD: 2.91 M/UL — LOW (ref 4.2–5.8)
RBC # FLD: 16.5 % — HIGH (ref 10.3–14.5)
SODIUM SERPL-SCNC: 136 MMOL/L — SIGNIFICANT CHANGE UP (ref 135–145)
WBC # BLD: 9.12 K/UL — SIGNIFICANT CHANGE UP (ref 3.8–10.5)
WBC # FLD AUTO: 9.12 K/UL — SIGNIFICANT CHANGE UP (ref 3.8–10.5)

## 2018-05-05 PROCEDURE — 99233 SBSQ HOSP IP/OBS HIGH 50: CPT

## 2018-05-05 RX ADMIN — Medication 2: at 13:21

## 2018-05-05 RX ADMIN — Medication 150 MILLIGRAM(S): at 21:42

## 2018-05-05 RX ADMIN — Medication 81 MILLIGRAM(S): at 12:42

## 2018-05-05 RX ADMIN — TAMSULOSIN HYDROCHLORIDE 0.4 MILLIGRAM(S): 0.4 CAPSULE ORAL at 21:42

## 2018-05-05 RX ADMIN — HEPARIN SODIUM 5000 UNIT(S): 5000 INJECTION INTRAVENOUS; SUBCUTANEOUS at 13:20

## 2018-05-05 RX ADMIN — HEPARIN SODIUM 5000 UNIT(S): 5000 INJECTION INTRAVENOUS; SUBCUTANEOUS at 05:11

## 2018-05-05 RX ADMIN — PIPERACILLIN AND TAZOBACTAM 25 GRAM(S): 4; .5 INJECTION, POWDER, LYOPHILIZED, FOR SOLUTION INTRAVENOUS at 17:39

## 2018-05-05 RX ADMIN — Medication 90 MILLIGRAM(S): at 05:11

## 2018-05-05 RX ADMIN — Medication 40 MILLIGRAM(S): at 05:11

## 2018-05-05 RX ADMIN — Medication 40 MILLIGRAM(S): at 17:40

## 2018-05-05 RX ADMIN — PIPERACILLIN AND TAZOBACTAM 25 GRAM(S): 4; .5 INJECTION, POWDER, LYOPHILIZED, FOR SOLUTION INTRAVENOUS at 05:11

## 2018-05-05 RX ADMIN — HEPARIN SODIUM 5000 UNIT(S): 5000 INJECTION INTRAVENOUS; SUBCUTANEOUS at 21:49

## 2018-05-05 NOTE — PROGRESS NOTE ADULT - ASSESSMENT
64 y/o man with HTN, DM2, CKD stage 4, PAD s/p R BKA, recent L femoral to below knee bypass with RSVG 3/2018, course c/b acute on chronic diastoliC CHF, sent in from rehab with LLE wound dehiscence. Medicine consulted for co-management.    *LLE wound dehiscence, hematoma: s/p exploration, ligation of below the knee popliteal artery on 5/3    - f/u cultures speciation/sensitivity  - pain control, bowel regimen ( pt educated about need to ask for pain meds when in pain)  - management per primary vascular service  - PT    *CV: HTN, h/o diastolic CHF (chronic), PAD  - euvolemic on exam  - BP currently at goal  - cont Lasix 40 mg BID  - cont nifedipine XL 90 mg daily, labetalol 100 mg BID  - cont aspirin, statin  - monitor BP    *DM2: A1c 5.5 -   - continue correction scale insulin  - monitor fingersticks - currently mostly at goal  - if fingersticks persistently elevated, consider standing insulin    *CKD stage 4: Cr at baseline  - renally dose all medications  - avoid nephrotoxic agents  - monitor Cr    Thanks for the consult. Hospitalist will follow with you.

## 2018-05-05 NOTE — PROGRESS NOTE ADULT - SUBJECTIVE AND OBJECTIVE BOX
Patient is a 63y old  Male who presents with a chief complaint of RIGHT LEG SURGICAL SITE BLEEDING (02 May 2018 23:37)      SUBJECTIVE / OVERNIGHT EVENTS: Pt reports mild back pain and lower extremity pain. Denies fever or chills    MEDICATIONS  (STANDING):  aspirin enteric coated 81 milliGRAM(s) Oral daily  dextrose 5%. 1000 milliLiter(s) (50 mL/Hr) IV Continuous <Continuous>  dextrose 50% Injectable 12.5 Gram(s) IV Push once  dextrose 50% Injectable 25 Gram(s) IV Push once  dextrose 50% Injectable 25 Gram(s) IV Push once  furosemide    Tablet 40 milliGRAM(s) Oral two times a day  heparin  Injectable 5000 Unit(s) SubCutaneous every 8 hours  insulin lispro (HumaLOG) corrective regimen sliding scale   SubCutaneous three times a day before meals  insulin lispro (HumaLOG) corrective regimen sliding scale   SubCutaneous at bedtime  labetalol 100 milliGRAM(s) Oral two times a day  NIFEdipine XL 90 milliGRAM(s) Oral daily  piperacillin/tazobactam IVPB. 3.375 Gram(s) IV Intermittent every 12 hours  tamsulosin 0.4 milliGRAM(s) Oral at bedtime  vancomycin  IVPB 750 milliGRAM(s) IV Intermittent every 24 hours  vancomycin  IVPB        MEDICATIONS  (PRN):  acetaminophen   Tablet. 650 milliGRAM(s) Oral every 6 hours PRN Mild Pain (1 - 3)  dextrose Gel 1 Dose(s) Oral once PRN Blood Glucose LESS THAN 70 milliGRAM(s)/deciliter  glucagon  Injectable 1 milliGRAM(s) IntraMuscular once PRN Glucose LESS THAN 70 milligrams/deciliter  morphine  - Injectable 2 milliGRAM(s) IV Push every 4 hours PRN Moderate Pain (4 - 6)  morphine  - Injectable 4 milliGRAM(s) IV Push every 4 hours PRN Severe Pain (7 - 10)      T(C): 36.9 (05-05-18 @ 10:09), Max: 37.2 (05-04-18 @ 14:17)  HR: 79 (05-05-18 @ 10:09) (71 - 80)  BP: 112/46 (05-05-18 @ 10:09) (112/46 - 136/41)  RR: 16 (05-05-18 @ 10:09) (16 - 18)  SpO2: 94% (05-05-18 @ 10:09) (94% - 96%)  CAPILLARY BLOOD GLUCOSE      POCT Blood Glucose.: 103 mg/dL (05 May 2018 09:06)  POCT Blood Glucose.: 258 mg/dL (04 May 2018 22:15)  POCT Blood Glucose.: 129 mg/dL (04 May 2018 18:23)  POCT Blood Glucose.: 228 mg/dL (04 May 2018 12:51)    I&O's Summary    04 May 2018 07:01  -  05 May 2018 07:00  --------------------------------------------------------  IN: 0 mL / OUT: 900 mL / NET: -900 mL    05 May 2018 07:01  -  05 May 2018 10:16  --------------------------------------------------------  IN: 0 mL / OUT: 200 mL / NET: -200 mL        PHYSICAL EXAM:  GENERAL: NAD,   HEAD:  Normocephalic  EYES: EOMI,  conjunctiva and sclera clear  NECK: Supple,   CHEST/LUNG: Clear to auscultation bilaterally; No wheeze  HEART:  s1 s2 + Regular rate and rhythm;   ABDOMEN: Soft, Nontender, Nondistended; Bowel sounds present  EXTREMITIES:   R BKA, LLL dressing  NEURO: AAOx3      LABS:                        8.0    9.12  )-----------( 264      ( 05 May 2018 09:00 )             24.7     05-05    136  |  99  |  52<H>  ----------------------------<  90  4.5   |  23  |  3.55<H>    Ca    7.6<L>      05 May 2018 09:00  Phos  4.0     05-05  Mg     1.9     05-05      PT/INR - ( 04 May 2018 09:35 )   PT: 11.6 SEC;   INR: 1.04          PTT - ( 04 May 2018 09:35 )  PTT:39.1 SEC

## 2018-05-05 NOTE — PROGRESS NOTE ADULT - ASSESSMENT
A/P: 63M s/p L fem bk pop pt bypass 3/29, readmitted for 6cm breakdown in below knee incision. Patient now s/p evacuation hematoma, ligation of below the knee popliteal artery on 5/3/18, hemodynamically stable.    - Continue regular diet  - Daily dressing changes to incision site with iodoform packing  - betadine to toes with dressing changes  - continue neurovascular checks  - Continue to monitor H/H  f/u wound cx

## 2018-05-05 NOTE — PROGRESS NOTE ADULT - SUBJECTIVE AND OBJECTIVE BOX
VASCULAR SURGERY AM PROGRESS NOTE    S: Patient seen and examined at bedside. No acute events overnight.    O:  Vital Signs Last 24 Hrs  T(C): 36.4 (05 May 2018 05:08), Max: 37.2 (04 May 2018 10:02)  T(F): 97.6 (05 May 2018 05:08), Max: 99 (04 May 2018 14:17)  HR: 71 (05 May 2018 05:08) (65 - 80)  BP: 121/57 (05 May 2018 05:08) (121/57 - 136/41)  BP(mean): --  RR: 17 (05 May 2018 05:08) (16 - 18)  SpO2: 96% (05 May 2018 05:08) (94% - 100%)      Physical Exam:  Gen: Laying in bed, no acute distress  Chest: symmetric chest rise no increased work of breathing  LLE: below the knee incision with packing, + PT, DP, AT signals, lle distally warm well perfused

## 2018-05-06 LAB
BUN SERPL-MCNC: 58 MG/DL — HIGH (ref 7–23)
CALCIUM SERPL-MCNC: 7.9 MG/DL — LOW (ref 8.4–10.5)
CHLORIDE SERPL-SCNC: 99 MMOL/L — SIGNIFICANT CHANGE UP (ref 98–107)
CO2 SERPL-SCNC: 21 MMOL/L — LOW (ref 22–31)
CREAT SERPL-MCNC: 3.61 MG/DL — HIGH (ref 0.5–1.3)
GLUCOSE BLDC GLUCOMTR-MCNC: 104 MG/DL — HIGH (ref 70–99)
GLUCOSE BLDC GLUCOMTR-MCNC: 114 MG/DL — HIGH (ref 70–99)
GLUCOSE BLDC GLUCOMTR-MCNC: 116 MG/DL — HIGH (ref 70–99)
GLUCOSE BLDC GLUCOMTR-MCNC: 136 MG/DL — HIGH (ref 70–99)
GLUCOSE BLDC GLUCOMTR-MCNC: 160 MG/DL — HIGH (ref 70–99)
GLUCOSE SERPL-MCNC: 102 MG/DL — HIGH (ref 70–99)
GRAM STN WND: SIGNIFICANT CHANGE UP
HCT VFR BLD CALC: 22.5 % — LOW (ref 39–50)
HGB BLD-MCNC: 7.4 G/DL — LOW (ref 13–17)
MCHC RBC-ENTMCNC: 27.9 PG — SIGNIFICANT CHANGE UP (ref 27–34)
MCHC RBC-ENTMCNC: 32.9 % — SIGNIFICANT CHANGE UP (ref 32–36)
MCV RBC AUTO: 84.9 FL — SIGNIFICANT CHANGE UP (ref 80–100)
METHOD TYPE: SIGNIFICANT CHANGE UP
METHOD TYPE: SIGNIFICANT CHANGE UP
NRBC # FLD: 0 — SIGNIFICANT CHANGE UP
ORGANISM # SPEC MICROSCOPIC CNT: SIGNIFICANT CHANGE UP
PLATELET # BLD AUTO: 282 K/UL — SIGNIFICANT CHANGE UP (ref 150–400)
PMV BLD: 10.3 FL — SIGNIFICANT CHANGE UP (ref 7–13)
POTASSIUM SERPL-MCNC: 4.9 MMOL/L — SIGNIFICANT CHANGE UP (ref 3.5–5.3)
POTASSIUM SERPL-SCNC: 4.9 MMOL/L — SIGNIFICANT CHANGE UP (ref 3.5–5.3)
RBC # BLD: 2.65 M/UL — LOW (ref 4.2–5.8)
RBC # FLD: 16.1 % — HIGH (ref 10.3–14.5)
SODIUM SERPL-SCNC: 135 MMOL/L — SIGNIFICANT CHANGE UP (ref 135–145)
SPECIMEN SOURCE: SIGNIFICANT CHANGE UP
SPECIMEN SOURCE: SIGNIFICANT CHANGE UP
WBC # BLD: 8.11 K/UL — SIGNIFICANT CHANGE UP (ref 3.8–10.5)
WBC # FLD AUTO: 8.11 K/UL — SIGNIFICANT CHANGE UP (ref 3.8–10.5)

## 2018-05-06 PROCEDURE — 99254 IP/OBS CNSLTJ NEW/EST MOD 60: CPT | Mod: GC

## 2018-05-06 PROCEDURE — 99233 SBSQ HOSP IP/OBS HIGH 50: CPT

## 2018-05-06 RX ORDER — CEFEPIME 1 G/1
1000 INJECTION, POWDER, FOR SOLUTION INTRAMUSCULAR; INTRAVENOUS EVERY 24 HOURS
Refills: 0 | Status: DISCONTINUED | OUTPATIENT
Start: 2018-05-06 | End: 2018-05-08

## 2018-05-06 RX ADMIN — CEFEPIME 100 MILLIGRAM(S): 1 INJECTION, POWDER, FOR SOLUTION INTRAMUSCULAR; INTRAVENOUS at 18:44

## 2018-05-06 RX ADMIN — Medication 40 MILLIGRAM(S): at 17:14

## 2018-05-06 RX ADMIN — PIPERACILLIN AND TAZOBACTAM 25 GRAM(S): 4; .5 INJECTION, POWDER, LYOPHILIZED, FOR SOLUTION INTRAVENOUS at 05:56

## 2018-05-06 RX ADMIN — HEPARIN SODIUM 5000 UNIT(S): 5000 INJECTION INTRAVENOUS; SUBCUTANEOUS at 13:16

## 2018-05-06 RX ADMIN — Medication 100 MILLIGRAM(S): at 13:17

## 2018-05-06 RX ADMIN — Medication 90 MILLIGRAM(S): at 05:56

## 2018-05-06 RX ADMIN — HEPARIN SODIUM 5000 UNIT(S): 5000 INJECTION INTRAVENOUS; SUBCUTANEOUS at 21:32

## 2018-05-06 RX ADMIN — Medication 100 MILLIGRAM(S): at 23:08

## 2018-05-06 RX ADMIN — PIPERACILLIN AND TAZOBACTAM 25 GRAM(S): 4; .5 INJECTION, POWDER, LYOPHILIZED, FOR SOLUTION INTRAVENOUS at 17:14

## 2018-05-06 RX ADMIN — Medication 81 MILLIGRAM(S): at 13:17

## 2018-05-06 RX ADMIN — TAMSULOSIN HYDROCHLORIDE 0.4 MILLIGRAM(S): 0.4 CAPSULE ORAL at 21:32

## 2018-05-06 RX ADMIN — HEPARIN SODIUM 5000 UNIT(S): 5000 INJECTION INTRAVENOUS; SUBCUTANEOUS at 05:57

## 2018-05-06 RX ADMIN — Medication 100 MILLIGRAM(S): at 00:05

## 2018-05-06 RX ADMIN — Medication 2: at 18:22

## 2018-05-06 RX ADMIN — Medication 40 MILLIGRAM(S): at 05:56

## 2018-05-06 NOTE — PROGRESS NOTE ADULT - SUBJECTIVE AND OBJECTIVE BOX
Patient is a 63y old  Male who presents with a chief complaint of RIGHT LEG SURGICAL SITE BLEEDING (02 May 2018 23:37)      SUBJECTIVE / OVERNIGHT EVENTS: Pt LE pain responds to pain medication, denies fever    MEDICATIONS  (STANDING):  aspirin enteric coated 81 milliGRAM(s) Oral daily  dextrose 5%. 1000 milliLiter(s) (50 mL/Hr) IV Continuous <Continuous>  dextrose 50% Injectable 12.5 Gram(s) IV Push once  dextrose 50% Injectable 25 Gram(s) IV Push once  dextrose 50% Injectable 25 Gram(s) IV Push once  furosemide    Tablet 40 milliGRAM(s) Oral two times a day  heparin  Injectable 5000 Unit(s) SubCutaneous every 8 hours  insulin lispro (HumaLOG) corrective regimen sliding scale   SubCutaneous three times a day before meals  insulin lispro (HumaLOG) corrective regimen sliding scale   SubCutaneous at bedtime  labetalol 100 milliGRAM(s) Oral two times a day  NIFEdipine XL 90 milliGRAM(s) Oral daily  piperacillin/tazobactam IVPB. 3.375 Gram(s) IV Intermittent every 12 hours  tamsulosin 0.4 milliGRAM(s) Oral at bedtime  vancomycin  IVPB 750 milliGRAM(s) IV Intermittent every 24 hours  vancomycin  IVPB        MEDICATIONS  (PRN):  acetaminophen   Tablet. 650 milliGRAM(s) Oral every 6 hours PRN Mild Pain (1 - 3)  dextrose Gel 1 Dose(s) Oral once PRN Blood Glucose LESS THAN 70 milliGRAM(s)/deciliter  glucagon  Injectable 1 milliGRAM(s) IntraMuscular once PRN Glucose LESS THAN 70 milligrams/deciliter  morphine  - Injectable 2 milliGRAM(s) IV Push every 4 hours PRN Moderate Pain (4 - 6)  morphine  - Injectable 4 milliGRAM(s) IV Push every 4 hours PRN Severe Pain (7 - 10)      T(C): 36.8 (05-06-18 @ 05:43), Max: 37.2 (05-05-18 @ 21:27)  HR: 68 (05-06-18 @ 05:43) (68 - 79)  BP: 124/60 (05-06-18 @ 05:43) (110/42 - 130/55)  RR: 18 (05-06-18 @ 05:43) (16 - 18)  SpO2: 95% (05-06-18 @ 05:43) (94% - 100%)  CAPILLARY BLOOD GLUCOSE      POCT Blood Glucose.: 104 mg/dL (06 May 2018 09:22)  POCT Blood Glucose.: 114 mg/dL (06 May 2018 05:53)  POCT Blood Glucose.: 94 mg/dL (05 May 2018 21:39)  POCT Blood Glucose.: 102 mg/dL (05 May 2018 18:17)  POCT Blood Glucose.: 168 mg/dL (05 May 2018 13:10)    I&O's Summary    05 May 2018 07:01  -  06 May 2018 07:00  --------------------------------------------------------  IN: 0 mL / OUT: 950 mL / NET: -950 mL    06 May 2018 07:01  -  06 May 2018 10:05  --------------------------------------------------------  IN: 0 mL / OUT: 100 mL / NET: -100 mL        PHYSICAL EXAM:  GENERAL: NAD,   HEAD:  Normocephalic  EYES: EOMI,  conjunctiva and sclera clear  NECK: Supple,   CHEST/LUNG: Clear to auscultation bilaterally; No wheeze  HEART:  s1 s2 + Regular rate and rhythm;   ABDOMEN: Soft, Nontender, Nondistended; Bowel sounds present  EXTREMITIES:   LLE dressing, RLE AKA  NEURO: AAOx3      LABS:                        7.4    8.11  )-----------( 282      ( 06 May 2018 06:25 )             22.5     05-06    135  |  99  |  58<H>  ----------------------------<  102<H>  4.9   |  21<L>  |  3.61<H>    Ca    7.9<L>      06 May 2018 06:25  Phos  4.0     05-05  Mg     1.9     05-05

## 2018-05-06 NOTE — PROGRESS NOTE ADULT - SUBJECTIVE AND OBJECTIVE BOX
VASCULAR SURGERY AM PROGRESS NOTE    S: Patient seen and examined at bedside. No acute events overnight.    O:  T(C): 36.8 (05-06-18 @ 05:43), Max: 37.2 (05-05-18 @ 21:27)  HR: 68 (05-06-18 @ 05:43) (68 - 79)  BP: 124/60 (05-06-18 @ 05:43) (110/42 - 130/55)  RR: 18 (05-06-18 @ 05:43) (16 - 18)  SpO2: 95% (05-06-18 @ 05:43) (94% - 100%)    Physical Exam:  Gen: Laying in bed, no acute distress  Chest: symmetric chest rise no increased work of breathing  LLE: below the knee incision with packing, + PT, DP, AT signals, lle distally warm well perfused       Culture - Surg Site Aerob/Anaer w/Gm St (collected 03 May 2018 18:45)  Source: OTHER  Preliminary Report (05 May 2018 09:42):    GNRID^Gram Neg Nabeel To Be Identified    GNRID#[id number]^Gram Neg Nabeel To Be Identified 2

## 2018-05-06 NOTE — CONSULT NOTE ADULT - SUBJECTIVE AND OBJECTIVE BOX
HPI:  64yo M with hx DM, CKD, HTN, R BKA, recent admission for left fem to below-knee pop bypass with RSVG (March 2018), now sent in from rehab with dehiscence of his lower extremity wound spanning approximately 4-5cm. He recently followed up with Dr. Crump in the office at which point his staples were removed and steristrips were applied. At rehab, he was engaging in physical therapy. It is unclear at what point the wound opened up (pt poor historian). Denies fevers/chills, no pain in the leg. (02 May 2018 21:43)    Id note-above reviewed. Patient had recent admission from 02/12-04/05 when he underwent left fem-pop bypass  On 05/02 sent from rehab for wound dehiscence found on Ct to have 13 x 5 cm hematoma upper calf at site of surgery. On 05/03 s/p LLe hematoma exploration and ligation of below knee popliteal with distal to distal anastomosis of fem pop bypass    PAST MEDICAL & SURGICAL HISTORY:  Kidney disease  HTN (hypertension)  DM (diabetes mellitus)  H/O peripheral artery bypass: left fem to below-knee pop with RSVG  S/P BKA (below knee amputation) unilateral, right      Allergies  No Known Allergies        ANTIMICROBIALS:  piperacillin/tazobactam IVPB. 3.375 every 12 hours  vancomycin  IVPB 750 every 24 hours  vancomycin  IVPB        OTHER MEDS: MEDICATIONS  (STANDING):  acetaminophen   Tablet. 650 every 6 hours PRN  aspirin enteric coated 81 daily  dextrose 50% Injectable 12.5 once  dextrose 50% Injectable 25 once  dextrose 50% Injectable 25 once  dextrose Gel 1 once PRN  furosemide    Tablet 40 two times a day  glucagon  Injectable 1 once PRN  heparin  Injectable 5000 every 8 hours  insulin lispro (HumaLOG) corrective regimen sliding scale  three times a day before meals  insulin lispro (HumaLOG) corrective regimen sliding scale  at bedtime  labetalol 100 two times a day  morphine  - Injectable 2 every 4 hours PRN  morphine  - Injectable 4 every 4 hours PRN  NIFEdipine XL 90 daily  tamsulosin 0.4 at bedtime      SOCIAL HISTORY:  [ ] etoh [ ] tobacco [ ] former smoker [ ] IVDU    FAMILY HISTORY:  Family history of diabetes mellitus (Mother)      REVIEW OF SYSTEMS  [  ] ROS unobtainable because:    [ x ] All other systems negative except as noted below:	    Constitutional:  [ ] fever [ ] chills  [ ] weight loss  [ ] weakness  Skin:  [ ] rash [ ] phlebitis	  Eyes: [ ] icterus [ ] pain  [ ] discharge	  ENMT: [ ] sore throat  [ ] thrush [ ] ulcers [ ] exudates  Respiratory: [ ] dyspnea [ ] hemoptysis [ ] cough [ ] sputum	  Cardiovascular:  [ ] chest pain [ ] palpitations [ ] edema	  Gastrointestinal:  [ ] nausea [ ] vomiting [ ] diarrhea [ ] constipation [ ] pain	  Genitourinary:  [ ] dysuria [ ] frequency [ ] hematuria [ ] discharge [ ] flank pain  [ ] incontinence  Musculoskeletal:  [ ] myalgias [ ] arthralgias [ ] arthritis  [ ] back pain  Neurological:  [ ] headache [ ] seizures  [ ] confusion/altered mental status  Psychiatric:  [ ] anxiety [ ] depression	  Hematology/Lymphatics:  [ ] lymphadenopathy  Endocrine:  [ ] adrenal [ ] thyroid  Allergic/Immunologic:	 [ ] transplant [ ] seasonal    Vital Signs Last 24 Hrs  T(F): 98.2 (05-06-18 @ 05:43), Max: 99 (05-04-18 @ 14:17)    Vital Signs Last 24 Hrs  HR: 68 (05-06-18 @ 05:43) (68 - 79)  BP: 124/60 (05-06-18 @ 05:43) (110/42 - 130/55)  RR: 18 (05-06-18 @ 05:43)  SpO2: 95% (05-06-18 @ 05:43) (94% - 100%)  Wt(kg): --    PHYSICAL EXAM:  General: non-toxic  HEAD/EYES: anicteric, PERRL  ENT:  supple  Cardiovascular:   S1, S2  Respiratory:  clear bilaterally  GI:  soft, non-tender, normal bowel sounds  :  no CVA tenderness   Musculoskeletal:  no synovitis  Neurologic:  grossly non-focal  Skin:  no rash  Lymph: no lymphadenopathy  Psychiatric:  appropriate affect  Vascular:  no phlebitis                                7.4    8.11  )-----------( 282      ( 06 May 2018 06:25 )             22.5       05-06    135  |  99  |  58<H>  ----------------------------<  102<H>  4.9   |  21<L>  |  3.61<H>    Ca    7.9<L>      06 May 2018 06:25  Phos  4.0     05-05  Mg     1.9     05-05            MICROBIOLOGY:  OTHER  05-03-18 --  --  --              v            RADIOLOGY:  < from: CT Lower Extremity No Cont, Left (05.03.18 @ 11:46) >  IMPRESSION:  Approximately 13.4 cm x 6.7 cm x 5.1 cm hematoma collection in deep   posterior compartment of upper calf subjacent to an upper medial calf   soft tissue defect representing site of wound dehiscence. In addition,   possibility of dehiscent distal vascular anastomotic site cannot be   excluded. Grossly unremarkable appearing proximal anastomotic site in   left groin region.    Circumferential thigh and calf subcutaneous swelling which could be   compatible with edema and/or inflammatory reaction/cellulitis. No   tracking gas collections or CT evidence for osteomyelitis.    Discussed with vascular surgery BECKI Segundo on 5/3/2018 at 1330 with read   back.    < end of copied text >    < from: Xray Chest 1 View- PORTABLE-Urgent (05.03.18 @ 11:24) >  INTERPRETATION:     Heart size and the mediastinum cannot be accurately evaluated on this   projection.  The lungs are clear.  No pleural effusion or pneumothorax is seen.  There is osteoarthritic degenerative change of the spine.              IMPRESSION:  Clear lungs.    < end of copied text > HPI:  62yo M with hx DM, CKD, HTN, R BKA, recent admission for left fem to below-knee pop bypass with RSVG (March 2018), now sent in from rehab with dehiscence of his lower extremity wound spanning approximately 4-5cm. He recently followed up with Dr. Crump in the office at which point his staples were removed and steristrips were applied. At rehab, he was engaging in physical therapy. It is unclear at what point the wound opened up (pt poor historian). Denies fevers/chills, no pain in the leg. (02 May 2018 21:43)    Id note-above reviewed. Patient had recent admission from 02/12-04/05 when he underwent left fem-pop bypass RSVG  On 05/02 sent from rehab for wound dehiscence found on Ct to have 13 x 5 cm hematoma upper calf at site of surgery. On 05/03 s/p LLe hematoma exploration and ligation of below knee popliteal with distal to distal anastomosis of fem pop bypass    PAST MEDICAL & SURGICAL HISTORY:  Kidney disease  HTN (hypertension)  DM (diabetes mellitus)  H/O peripheral artery bypass: left fem to below-knee pop with RSVG  S/P BKA (below knee amputation) unilateral, right      Allergies  No Known Allergies        ANTIMICROBIALS:  piperacillin/tazobactam IVPB. 3.375 every 12 hours  vancomycin  IVPB 750 every 24 hours  vancomycin  IVPB        OTHER MEDS: MEDICATIONS  (STANDING):  acetaminophen   Tablet. 650 every 6 hours PRN  aspirin enteric coated 81 daily  dextrose 50% Injectable 12.5 once  dextrose 50% Injectable 25 once  dextrose 50% Injectable 25 once  dextrose Gel 1 once PRN  furosemide    Tablet 40 two times a day  glucagon  Injectable 1 once PRN  heparin  Injectable 5000 every 8 hours  insulin lispro (HumaLOG) corrective regimen sliding scale  three times a day before meals  insulin lispro (HumaLOG) corrective regimen sliding scale  at bedtime  labetalol 100 two times a day  morphine  - Injectable 2 every 4 hours PRN  morphine  - Injectable 4 every 4 hours PRN  NIFEdipine XL 90 daily  tamsulosin 0.4 at bedtime      SOCIAL HISTORY: denies smoking etoh or drugs    FAMILY HISTORY:  Family history of diabetes mellitus (Mother)      REVIEW OF SYSTEMS  [  ] ROS unobtainable because:    [ x ] All other systems negative except as noted below:	    Constitutional:  [ ] fever [ ] chills  [ ] weight loss  [ ] weakness  Skin:  [ ] rash [ ] phlebitis	  Eyes: [ ] icterus [ ] pain  [ ] discharge	  ENMT: [ ] sore throat  [ ] thrush [ ] ulcers [ ] exudates  Respiratory: [ ] dyspnea [ ] hemoptysis [ ] cough [ ] sputum	  Cardiovascular:  [ ] chest pain [ ] palpitations [ ] edema	  Gastrointestinal:  [ ] nausea [ ] vomiting [ ] diarrhea [ ] constipation [ ] pain	  Genitourinary:  [ ] dysuria [ ] frequency [ ] hematuria [ ] discharge [ ] flank pain  [ ] incontinence  Musculoskeletal:  [ ] myalgias [ ] arthralgias [ ] arthritis  [ ] back pain  Neurological:  [ ] headache [ ] seizures  [ ] confusion/altered mental status  Psychiatric:  [ ] anxiety [ ] depression	  Hematology/Lymphatics:  [ ] lymphadenopathy  Endocrine:  [ ] adrenal [ ] thyroid  Allergic/Immunologic:	 [ ] transplant [ ] seasonal    Vital Signs Last 24 Hrs  T(F): 98.2 (05-06-18 @ 05:43), Max: 99 (05-04-18 @ 14:17)    Vital Signs Last 24 Hrs  HR: 68 (05-06-18 @ 05:43) (68 - 79)  BP: 124/60 (05-06-18 @ 05:43) (110/42 - 130/55)  RR: 18 (05-06-18 @ 05:43)  SpO2: 95% (05-06-18 @ 05:43) (94% - 100%)  Wt(kg): --    PHYSICAL EXAM:  General: non-toxic  HEAD/EYES: anicteric, PERRL  ENT:  supple  Cardiovascular:   S1, S2  Respiratory:  clear bilaterally  GI:  soft, non-tender, normal bowel sounds  :  no CVA tenderness   Musculoskeletal:  no synovitis s/p BKA rt  Neurologic:  grossly non-focal  Skin:  RT medial aspect of calf partially closed incision with staples non draining non tender, RT big toe superficial ulcers plantar and dorsal aspect  Lymph: no lymphadenopathy  Psychiatric:  appropriate affect  Vascular:  no phlebitis                                7.4    8.11  )-----------( 282      ( 06 May 2018 06:25 )             22.5       05-06    135  |  99  |  58<H>  ----------------------------<  102<H>  4.9   |  21<L>  |  3.61<H>    Ca    7.9<L>      06 May 2018 06:25  Phos  4.0     05-05  Mg     1.9     05-05            MICROBIOLOGY:  OTHER  05-03-18 --  --  --              v            RADIOLOGY:  < from: CT Lower Extremity No Cont, Left (05.03.18 @ 11:46) >  IMPRESSION:  Approximately 13.4 cm x 6.7 cm x 5.1 cm hematoma collection in deep   posterior compartment of upper calf subjacent to an upper medial calf   soft tissue defect representing site of wound dehiscence. In addition,   possibility of dehiscent distal vascular anastomotic site cannot be   excluded. Grossly unremarkable appearing proximal anastomotic site in   left groin region.    Circumferential thigh and calf subcutaneous swelling which could be   compatible with edema and/or inflammatory reaction/cellulitis. No   tracking gas collections or CT evidence for osteomyelitis.    Discussed with vascular surgery BECKI Segundo on 5/3/2018 at 1330 with read   back.    < end of copied text >    < from: Xray Chest 1 View- PORTABLE-Urgent (05.03.18 @ 11:24) >  INTERPRETATION:     Heart size and the mediastinum cannot be accurately evaluated on this   projection.  The lungs are clear.  No pleural effusion or pneumothorax is seen.  There is osteoarthritic degenerative change of the spine.              IMPRESSION:  Clear lungs.    < end of copied text >

## 2018-05-06 NOTE — PROGRESS NOTE ADULT - ASSESSMENT
A/P: 63M s/p L fem bk pop pt bypass 3/29, readmitted for 6cm breakdown in below knee incision. Patient now s/p evacuation hematoma, ligation of below the knee popliteal artery on 5/3/18, hemodynamically stable.    - Continue regular diet  - Daily dressing changes to incision site with iodoform packing  - betadine to toes with dressing changes  - continue neurovascular checks  - Continue to monitor H/H  - f/u wound cx

## 2018-05-06 NOTE — CONSULT NOTE ADULT - ATTENDING COMMENTS
62 yo M with hx DM, CKD, HTN, R BKA, recent admission for left fem to below-knee pop bypass with RSVG (March 2018), now sent in from rehab with dehiscence of his lower extremity wound spanning approximately 4-5cm.     CT with 13 x 5 cm hematoma at site of surgery  s/p LLE hematoma exploration and ligation of below knee popliteal with distal to distal anastomosis of fem pop bypass  Afebrile.  Wound cx with proteus, s. aureus, and acinetobacter    Recommend:  -Start cefepime 1 gram IV q 24 hours  -Continue vancomycin by level  -Awaiting sensitivity of S. negro Escobedo MD  Pager [phone]  After 5pm/weekends call [phone]      -

## 2018-05-06 NOTE — PROGRESS NOTE ADULT - ASSESSMENT
64 y/o man with HTN, DM2, CKD stage 4, PAD s/p R BKA, recent L femoral to below knee bypass with RSVG 3/2018, course c/b acute on chronic diastoliC CHF, sent in from rehab with LLE wound dehiscence. Medicine consulted for co-management.    *LLE wound dehiscence, hematoma: s/p exploration, ligation of below the knee popliteal artery on 5/3    - f/u cultures speciation/sensitivity, f/u ID recs  - pain control, bowel regimen ( pt educated about need to ask for pain meds when in pain)  - management per primary vascular service  - PT    *CV: HTN, h/o diastolic CHF (chronic), PAD  - euvolemic on exam  - BP currently at goal  - cont Lasix 40 mg BID  - cont nifedipine XL 90 mg daily, labetalol 100 mg BID  - cont aspirin, statin  - monitor BP    *DM2: A1c 5.5 -   - continue correction scale insulin  - monitor fingersticks - currently mostly at goal  - if fingersticks persistently elevated, consider standing insulin    *CKD stage 4: Cr worsening  - will recommend checking vanc level, if cr worsens f/u ID to see if Vanc can be switched ( discussed with vascular team)  - renally dose all medications  - avoid nephrotoxic agents  - monitor Cr    Thanks for the consult. Hospitalist will follow with you.

## 2018-05-06 NOTE — CONSULT NOTE ADULT - ASSESSMENT
62yo M with hx DM, CKD, HTN, R BKA, recent admission for left fem to below-knee pop bypass with RSVG (March 2018), now sent in from rehab with dehiscence of his lower extremity wound spanning approximately 4-5cm. 62yo M with hx DM, CKD, HTN, R BKA, recent admission for left fem to below-knee pop bypass with RSVG (March 2018), now sent in from rehab with dehiscence of his lower extremity wound spanning approximately 4-5cm found on Ct to have 13 x 5 cm hematoma upper calf at site of surgery. Found to have acute blood loss anemia with H/H 6/21 s/p 2 U PRBC. On 05/03 s/p LLe hematoma exploration and ligation of below knee popliteal with distal to distal anastomosis of fem pop bypass, OR cultures growing 2 types of GNR + Staph aureus    C/w Zosyn q12h and vancomycin (crcl 19) would change to 1 g daily  Check cr and trough prior to 4th dose  f/u wound cx and blood cx  Pending discussion 64yo M with hx DM, CKD, HTN, R BKA, recent admission for left fem to below-knee pop bypass with RSVG (March 2018), now sent in from rehab with dehiscence of his lower extremity wound spanning approximately 4-5cm found on Ct to have 13 x 5 cm hematoma upper calf at site of surgery. Found to have acute blood loss anemia with H/H 6/21 s/p 2 U PRBC. On 05/03 s/p LLe hematoma exploration and ligation of below knee popliteal with distal to distal anastomosis of fem pop bypass, OR cultures growing Proteus, acinitobacter  + Staph aureus    Dc Zosyn and start cefepime 1 g IV once daily and cw vancomycin (crcl 19) would change to 1 g by level  Monitor renal function  f/u wound cx and blood cx

## 2018-05-07 LAB
-  AMIKACIN: SIGNIFICANT CHANGE UP
-  AMIKACIN: SIGNIFICANT CHANGE UP
-  AMPICILLIN/SULBACTAM: SIGNIFICANT CHANGE UP
-  AMPICILLIN/SULBACTAM: SIGNIFICANT CHANGE UP
-  AMPICILLIN: SIGNIFICANT CHANGE UP
-  AZTREONAM: SIGNIFICANT CHANGE UP
-  CEFAZOLIN: SIGNIFICANT CHANGE UP
-  CEFAZOLIN: SIGNIFICANT CHANGE UP
-  CEFEPIME: SIGNIFICANT CHANGE UP
-  CEFEPIME: SIGNIFICANT CHANGE UP
-  CEFOXITIN: SIGNIFICANT CHANGE UP
-  CEFTAZIDIME: SIGNIFICANT CHANGE UP
-  CEFTAZIDIME: SIGNIFICANT CHANGE UP
-  CEFTRIAXONE: SIGNIFICANT CHANGE UP
-  CIPROFLOXACIN: SIGNIFICANT CHANGE UP
-  CLINDAMYCIN: SIGNIFICANT CHANGE UP
-  ERTAPENEM: SIGNIFICANT CHANGE UP
-  ERYTHROMYCIN: SIGNIFICANT CHANGE UP
-  GENTAMICIN: SIGNIFICANT CHANGE UP
-  LEVOFLOXACIN: SIGNIFICANT CHANGE UP
-  LEVOFLOXACIN: SIGNIFICANT CHANGE UP
-  MEROPENEM: SIGNIFICANT CHANGE UP
-  MEROPENEM: SIGNIFICANT CHANGE UP
-  MOXIFLOXACIN(AEROBIC): SIGNIFICANT CHANGE UP
-  OXACILLIN: SIGNIFICANT CHANGE UP
-  PENICILLIN: SIGNIFICANT CHANGE UP
-  PIPERACILLIN/TAZOBACTAM: SIGNIFICANT CHANGE UP
-  RIFAMPIN.: SIGNIFICANT CHANGE UP
-  TETRACYCLINE: SIGNIFICANT CHANGE UP
-  TOBRAMYCIN: SIGNIFICANT CHANGE UP
-  TOBRAMYCIN: SIGNIFICANT CHANGE UP
-  TRIMETHOPRIM/SULFAMETHOXAZOLE: SIGNIFICANT CHANGE UP
-  TRIMETHOPRIM/SULFAMETHOXAZOLE: SIGNIFICANT CHANGE UP
-  VANCOMYCIN: SIGNIFICANT CHANGE UP
BUN SERPL-MCNC: 64 MG/DL — HIGH (ref 7–23)
CALCIUM SERPL-MCNC: 8.1 MG/DL — LOW (ref 8.4–10.5)
CHLORIDE SERPL-SCNC: 96 MMOL/L — LOW (ref 98–107)
CO2 SERPL-SCNC: 21 MMOL/L — LOW (ref 22–31)
CREAT SERPL-MCNC: 3.99 MG/DL — HIGH (ref 0.5–1.3)
GLUCOSE BLDC GLUCOMTR-MCNC: 146 MG/DL — HIGH (ref 70–99)
GLUCOSE BLDC GLUCOMTR-MCNC: 153 MG/DL — HIGH (ref 70–99)
GLUCOSE BLDC GLUCOMTR-MCNC: 164 MG/DL — HIGH (ref 70–99)
GLUCOSE BLDC GLUCOMTR-MCNC: 99 MG/DL — SIGNIFICANT CHANGE UP (ref 70–99)
GLUCOSE SERPL-MCNC: 85 MG/DL — SIGNIFICANT CHANGE UP (ref 70–99)
HCT VFR BLD CALC: 23.1 % — LOW (ref 39–50)
HGB BLD-MCNC: 7.3 G/DL — LOW (ref 13–17)
MAGNESIUM SERPL-MCNC: 2.1 MG/DL — SIGNIFICANT CHANGE UP (ref 1.6–2.6)
MCHC RBC-ENTMCNC: 27 PG — SIGNIFICANT CHANGE UP (ref 27–34)
MCHC RBC-ENTMCNC: 31.6 % — LOW (ref 32–36)
MCV RBC AUTO: 85.6 FL — SIGNIFICANT CHANGE UP (ref 80–100)
METHOD TYPE: SIGNIFICANT CHANGE UP
NRBC # FLD: 0 — SIGNIFICANT CHANGE UP
PHOSPHATE SERPL-MCNC: 5.4 MG/DL — HIGH (ref 2.5–4.5)
PLATELET # BLD AUTO: 323 K/UL — SIGNIFICANT CHANGE UP (ref 150–400)
PMV BLD: 11 FL — SIGNIFICANT CHANGE UP (ref 7–13)
POTASSIUM SERPL-MCNC: 5 MMOL/L — SIGNIFICANT CHANGE UP (ref 3.5–5.3)
POTASSIUM SERPL-SCNC: 5 MMOL/L — SIGNIFICANT CHANGE UP (ref 3.5–5.3)
RBC # BLD: 2.7 M/UL — LOW (ref 4.2–5.8)
RBC # FLD: 15.9 % — HIGH (ref 10.3–14.5)
SODIUM SERPL-SCNC: 135 MMOL/L — SIGNIFICANT CHANGE UP (ref 135–145)
VANCOMYCIN FLD-MCNC: 18.8 UG/ML — SIGNIFICANT CHANGE UP
WBC # BLD: 7.79 K/UL — SIGNIFICANT CHANGE UP (ref 3.8–10.5)
WBC # FLD AUTO: 7.79 K/UL — SIGNIFICANT CHANGE UP (ref 3.8–10.5)

## 2018-05-07 PROCEDURE — 99232 SBSQ HOSP IP/OBS MODERATE 35: CPT

## 2018-05-07 PROCEDURE — 99233 SBSQ HOSP IP/OBS HIGH 50: CPT

## 2018-05-07 RX ORDER — POLYETHYLENE GLYCOL 3350 17 G/17G
17 POWDER, FOR SOLUTION ORAL DAILY
Refills: 0 | Status: DISCONTINUED | OUTPATIENT
Start: 2018-05-07 | End: 2018-05-11

## 2018-05-07 RX ORDER — SENNA PLUS 8.6 MG/1
2 TABLET ORAL AT BEDTIME
Refills: 0 | Status: DISCONTINUED | OUTPATIENT
Start: 2018-05-07 | End: 2018-05-11

## 2018-05-07 RX ORDER — DOCUSATE SODIUM 100 MG
100 CAPSULE ORAL
Refills: 0 | Status: DISCONTINUED | OUTPATIENT
Start: 2018-05-07 | End: 2018-05-11

## 2018-05-07 RX ADMIN — Medication 90 MILLIGRAM(S): at 05:17

## 2018-05-07 RX ADMIN — HEPARIN SODIUM 5000 UNIT(S): 5000 INJECTION INTRAVENOUS; SUBCUTANEOUS at 23:05

## 2018-05-07 RX ADMIN — Medication 100 MILLIGRAM(S): at 12:35

## 2018-05-07 RX ADMIN — CEFEPIME 100 MILLIGRAM(S): 1 INJECTION, POWDER, FOR SOLUTION INTRAMUSCULAR; INTRAVENOUS at 17:21

## 2018-05-07 RX ADMIN — HEPARIN SODIUM 5000 UNIT(S): 5000 INJECTION INTRAVENOUS; SUBCUTANEOUS at 05:17

## 2018-05-07 RX ADMIN — TAMSULOSIN HYDROCHLORIDE 0.4 MILLIGRAM(S): 0.4 CAPSULE ORAL at 23:05

## 2018-05-07 RX ADMIN — POLYETHYLENE GLYCOL 3350 17 GRAM(S): 17 POWDER, FOR SOLUTION ORAL at 12:35

## 2018-05-07 RX ADMIN — Medication 100 MILLIGRAM(S): at 23:05

## 2018-05-07 RX ADMIN — Medication 81 MILLIGRAM(S): at 12:35

## 2018-05-07 RX ADMIN — Medication 2: at 13:21

## 2018-05-07 RX ADMIN — HEPARIN SODIUM 5000 UNIT(S): 5000 INJECTION INTRAVENOUS; SUBCUTANEOUS at 13:21

## 2018-05-07 RX ADMIN — Medication 40 MILLIGRAM(S): at 05:17

## 2018-05-07 RX ADMIN — Medication 40 MILLIGRAM(S): at 17:19

## 2018-05-07 RX ADMIN — SENNA PLUS 2 TABLET(S): 8.6 TABLET ORAL at 23:05

## 2018-05-07 NOTE — PROGRESS NOTE ADULT - ASSESSMENT
64 yo M with hx DM, CKD, HTN, R BKA, recent admission for left fem to below-knee pop bypass with RSVG (March 2018), now sent in from rehab with dehiscence of his lower extremity wound spanning approximately 4-5cm.     CT with 13 x 5 cm hematoma at site of surgery  s/p LLE hematoma exploration and ligation of below knee popliteal with distal to distal anastomosis of fem pop bypass  Afebrile.  Wound cx with proteus, s. aureus, and acinetobacter  Graft is RSVG - no foreign material per vascular    Recommend:  -Continue cefepime 1 gram IV q 24 hours and vancomycin by level  -S. auerus sensitivities appears MSSA, but resistant to cefazolin. Contacted microbiology tech and will rerun sensitivities.    Guero Escobedo MD  Pager [phone]  After 5pm/weekends call [phone]      -

## 2018-05-07 NOTE — PROGRESS NOTE ADULT - ASSESSMENT
64 y/o man with HTN, DM2, CKD stage 4, PAD s/p R BKA, recent L femoral to below knee bypass with RSVG 3/2018, course c/b acute on chronic diastoliC CHF, sent in from rehab with LLE wound dehiscence. Medicine consulted for co-management.    *LLE wound dehiscence, hematoma: s/p exploration, ligation of below the knee popliteal artery on 5/3  - appreciate ID recs  - abx per ID plan  - pain control, bowel regimen ( pt educated about need to ask for pain meds when in pain)  - management per primary vascular service  - PT    *Vision loss: reports history of vision loss, had injections prior - likely diabetic retinopathy given h/o diabetes  - recommend ophthalmology consult    *CV: HTN, h/o diastolic CHF (chronic), PAD  - euvolemic on exam  - BP currently at goal  - cont Lasix 40 mg BID  - cont nifedipine XL 90 mg daily, labetalol 100 mg BID  - cont aspirin, statin  - monitor BP    *DM2: A1c 5.5 -   - continue correction scale insulin  - monitor fingersticks - currently mostly at goal  - if fingersticks persistently elevated, consider standing insulin    *CKD stage 4: Cr worsening  - will recommend checking vanc level, if cr worsens f/u ID to see if Vanc can be switched ( discussed with vascular team)  - renally dose all medications  - avoid nephrotoxic agents  - monitor Cr    Thanks for the consult. Hospitalist will follow with you.

## 2018-05-07 NOTE — PROGRESS NOTE ADULT - ASSESSMENT
63M s/p L fem bk pop pt bypass 3/29, readmitted for 6cm breakdown in below knee incision. Patient now s/p evacuation hematoma, ligation of below the knee popliteal artery on 5/3/18, hemodynamically stable.    -Surgical site wound culture from 05/03/18 grew Acinetobacter baumannii, Proteus mirabilis, Staph aureus.  - f/u wound cx; awaiting ID plan regarding PICC for long term abx, depending on repeat cultures  -Worsening BUN/Creatinine; 64/3.99 today 05/07/18 up from 58/3.61; being followed by Hospitalist, recommends renally dosing all meds and avoiding nephrotoxic agents (Vanc stopped; switched to Cefepime)  - Continue regular diet  - Daily dressing changes to incision site with iodoform packing and betadine to toes with dressing changes  - continue neurovascular checks  - Dispo planning

## 2018-05-07 NOTE — PROGRESS NOTE ADULT - SUBJECTIVE AND OBJECTIVE BOX
CC: Patient is a 63y old  Male who presents with a chief complaint of right surgical site bleeding    ID following for right surgical site hematoma concern for infection    Interval History/ROS: Patient feels well. Has no complaints. Denies fever, chills.    Allergies  No Known Allergies    ANTIMICROBIALS:  cefepime   IVPB 1000 every 24 hours    PE:    Vital Signs Last 24 Hrs  T(C): 36.6 (07 May 2018 17:13), Max: 36.9 (07 May 2018 05:16)  T(F): 97.9 (07 May 2018 17:13), Max: 98.4 (07 May 2018 05:16)  HR: 66 (07 May 2018 17:13) (66 - 77)  BP: 130/62 (07 May 2018 17:13) (119/50 - 143/51)  BP(mean): --  RR: 19 (07 May 2018 17:13) (17 - 20)  SpO2: 97% (07 May 2018 17:13) (94% - 97%)    General: non-toxic  HEAD/EYES: anicteric, PERRL  ENT:  supple  Cardiovascular:   S1, S2  Respiratory:  clear bilaterally  GI:  soft, non-tender, normal bowel sounds  :  no CVA tenderness   Musculoskeletal:  no synovitis s/p BKA rt  Neurologic:  grossly non-focal  Skin:  RT medial aspect of calf partially closed incision with staples non draining non tender, RT big toe superficial ulcers plantar and dorsal aspect  Lymph: no lymphadenopathy  Psychiatric:  appropriate affect  Vascular:  no phlebitis      LABS:                          7.3    7.79  )-----------( 323      ( 07 May 2018 05:27 )             23.1       05-07    135  |  96<L>  |  64<H>  ----------------------------<  85  5.0   |  21<L>  |  3.99<H>    Ca    8.1<L>      07 May 2018 05:27  Phos  5.4     05-07  Mg     2.1     05-07    MICROBIOLOGY:  Vancomycin Level, Random: 18.8 ug/mL (05-07-18 @ 05:27)  v  BLOOD PERIPHERAL  05-05-18 --  --  --      OTHER  05-03-18 --  --  Acinetobacter baumannii  Proteus mirabilis  Staphylococcus aureus    RADIOLOGY:    No new images.

## 2018-05-07 NOTE — PROGRESS NOTE ADULT - SUBJECTIVE AND OBJECTIVE BOX
Surgery Team C (Vascular) Progress Note:    Hospital Day: 6 s/p exploration and popliteal artery repair due to previous incision dehiscence    Subjective:  No acute overnight events.  Patient seen this AM at bedside, no acute issues at this time.      Objective:  T(C): 36.9 (05-07-18 @ 05:16), Max: 36.9 (05-07-18 @ 05:16)  HR: 71 (05-07-18 @ 05:16) (67 - 77)  BP: 126/46 (05-07-18 @ 05:16) (119/50 - 133/48)  RR: 18 (05-07-18 @ 05:16) (17 - 20)  SpO2: 95% (05-07-18 @ 05:16) (94% - 95%)    Labs:  05-06-18 @ 07:01  -  05-07-18 @ 07:00  --------------------------------------------------------  IN: 0 mL / OUT: 825 mL / NET: -825 mL    Focused Physical Exam:  General: NAD  Respiratory: Nonlabored breathing  Extremities: LLE: below the knee incision with packing, + PT, DP, AT signals, lle distally warm well perfused     Medications:  acetaminophen   Tablet. 650 milliGRAM(s) Oral every 6 hours PRN  aspirin enteric coated 81 milliGRAM(s) Oral daily  cefepime   IVPB 1000 milliGRAM(s) IV Intermittent every 24 hours  dextrose 5%. 1000 milliLiter(s) IV Continuous <Continuous>  dextrose 50% Injectable 12.5 Gram(s) IV Push once  dextrose 50% Injectable 25 Gram(s) IV Push once  dextrose 50% Injectable 25 Gram(s) IV Push once  dextrose Gel 1 Dose(s) Oral once PRN  furosemide    Tablet 40 milliGRAM(s) Oral two times a day  glucagon  Injectable 1 milliGRAM(s) IntraMuscular once PRN  heparin  Injectable 5000 Unit(s) SubCutaneous every 8 hours  insulin lispro (HumaLOG) corrective regimen sliding scale   SubCutaneous three times a day before meals  insulin lispro (HumaLOG) corrective regimen sliding scale   SubCutaneous at bedtime  labetalol 100 milliGRAM(s) Oral two times a day  morphine  - Injectable 2 milliGRAM(s) IV Push every 4 hours PRN  morphine  - Injectable 4 milliGRAM(s) IV Push every 4 hours PRN  NIFEdipine XL 90 milliGRAM(s) Oral daily  tamsulosin 0.4 milliGRAM(s) Oral at bedtime Surgery Team C (Vascular) Progress Note:    Hospital Day: 6 s/p exploration and popliteal artery repair due to previous incision dehiscence    Subjective:  No acute overnight events.  Patient seen this AM at bedside, no acute issues at this time.      Objective:  T(C): 36.9 (05-07-18 @ 05:16), Max: 36.9 (05-07-18 @ 05:16)  HR: 71 (05-07-18 @ 05:16) (67 - 77)  BP: 126/46 (05-07-18 @ 05:16) (119/50 - 133/48)  RR: 18 (05-07-18 @ 05:16) (17 - 20)  SpO2: 95% (05-07-18 @ 05:16) (94% - 95%)    Labs:  05-06-18 @ 07:01  -  05-07-18 @ 07:00  --------------------------------------------------------  IN: 0 mL / OUT: 825 mL / NET: -825 mL    Focused Physical Exam:  General: NAD  Respiratory: Nonlabored breathing  Extremities: LLE: below the knee incision with packing, + PT, DP, AT signals, lle distally warm well perfused     Medications:  acetaminophen   Tablet. 650 milliGRAM(s) Oral every 6 hours PRN  aspirin enteric coated 81 milliGRAM(s) Oral daily  cefepime   IVPB 1000 milliGRAM(s) IV Intermittent every 24 hours  dextrose 5%. 1000 milliLiter(s) IV Continuous <Continuous>  dextrose 50% Injectable 12.5 Gram(s) IV Push once  dextrose 50% Injectable 25 Gram(s) IV Push once  dextrose 50% Injectable 25 Gram(s) IV Push once  dextrose Gel 1 Dose(s) Oral once PRN  furosemide    Tablet 40 milliGRAM(s) Oral two times a day  glucagon  Injectable 1 milliGRAM(s) IntraMuscular once PRN  heparin  Injectable 5000 Unit(s) SubCutaneous every 8 hours  insulin lispro (HumaLOG) corrective regimen sliding scale   SubCutaneous three times a day before meals  insulin lispro (HumaLOG) corrective regimen sliding scale   SubCutaneous at bedtime  labetalol 100 milliGRAM(s) Oral two times a day  morphine  - Injectable 2 milliGRAM(s) IV Push every 4 hours PRN  morphine  - Injectable 4 milliGRAM(s) IV Push every 4 hours PRN  NIFEdipine XL 90 milliGRAM(s) Oral daily  tamsulosin 0.4 milliGRAM(s) Oral at bedtime                          7.3    7.79  )-----------( 323      ( 07 May 2018 05:27 )             23.1   05-07    135  |  96<L>  |  64<H>  ----------------------------<  85  5.0   |  21<L>  |  3.99<H>    Ca    8.1<L>      07 May 2018 05:27  Phos  5.4     05-07  Mg     2.1     05-07    Wound cx reviewed

## 2018-05-07 NOTE — PROGRESS NOTE ADULT - SUBJECTIVE AND OBJECTIVE BOX
CHIEF COMPLAINT: Patient is a 63y old  male who presents with a chief complaint of RIGHT LEG SURGICAL SITE BLEEDING (02 May 2018 23:37)      SUBJECTIVE / OVERNIGHT EVENTS:    No complaints. Denies pain. Denies SOB.    MEDICATIONS  (STANDING):  aspirin enteric coated 81 milliGRAM(s) Oral daily  cefepime   IVPB 1000 milliGRAM(s) IV Intermittent every 24 hours  dextrose 5%. 1000 milliLiter(s) (50 mL/Hr) IV Continuous <Continuous>  dextrose 50% Injectable 12.5 Gram(s) IV Push once  dextrose 50% Injectable 25 Gram(s) IV Push once  dextrose 50% Injectable 25 Gram(s) IV Push once  furosemide    Tablet 40 milliGRAM(s) Oral two times a day  heparin  Injectable 5000 Unit(s) SubCutaneous every 8 hours  insulin lispro (HumaLOG) corrective regimen sliding scale   SubCutaneous three times a day before meals  insulin lispro (HumaLOG) corrective regimen sliding scale   SubCutaneous at bedtime  labetalol 100 milliGRAM(s) Oral two times a day  NIFEdipine XL 90 milliGRAM(s) Oral daily  polyethylene glycol 3350 17 Gram(s) Oral daily  senna 2 Tablet(s) Oral at bedtime  tamsulosin 0.4 milliGRAM(s) Oral at bedtime    MEDICATIONS  (PRN):  acetaminophen   Tablet. 650 milliGRAM(s) Oral every 6 hours PRN Mild Pain (1 - 3)  dextrose Gel 1 Dose(s) Oral once PRN Blood Glucose LESS THAN 70 milliGRAM(s)/deciliter  glucagon  Injectable 1 milliGRAM(s) IntraMuscular once PRN Glucose LESS THAN 70 milligrams/deciliter  morphine  - Injectable 2 milliGRAM(s) IV Push every 4 hours PRN Moderate Pain (4 - 6)  morphine  - Injectable 4 milliGRAM(s) IV Push every 4 hours PRN Severe Pain (7 - 10)      VITALS:  T(F): 98.3 (05-07-18 @ 10:01), Max: 98.4 (05-07-18 @ 05:16)  HR: 73 (05-07-18 @ 10:01) (67 - 77)  BP: 138/52 (05-07-18 @ 10:01) (119/50 - 138/52)  RR: 18 (05-07-18 @ 10:01) (17 - 20)  SpO2: 94% (05-07-18 @ 10:01)      CAPILLARY BLOOD GLUCOSE    Output     I&O's Summary  T(F): 98.3 (05-07-18 @ 10:01), Max: 98.4 (05-07-18 @ 05:16)  HR: 73 (05-07-18 @ 10:01) (67 - 77)  BP: 138/52 (05-07-18 @ 10:01) (119/50 - 138/52)  RR: 18 (05-07-18 @ 10:01) (17 - 20)  SpO2: 94% (05-07-18 @ 10:01)    PHYSICAL EXAM:  GENERAL: middle-aged man lying in bed, NAD  HEAD:  Atraumatic, Normocephalic  EYES: EOMI  NECK: Supple, No JVD  CHEST/LUNG: nonlabored breathing  HEART: nl S1/S2  ABDOMEN: nondistended, soft  EXTREMITIES:  no LE edema  PSYCH: alert, answering questions appropriately  SKIN: No rashes noted    LABS:              7.3                  135  | 21   | 64           7.79  >-----------< 323     ------------------------< 85                    23.1                 5.0  | 96   | 3.99                                         Ca 8.1   Mg 2.1   Ph 5.4                        MICROBIOLOGY:    Culture - Blood (collected 05 May 2018 17:25)  Source: BLOOD VENOUS  Preliminary Report (06 May 2018 17:25):    NO ORGANISMS ISOLATED    NO ORGANISMS ISOLATED AT 24 HOURS    Culture - Blood (collected 05 May 2018 17:25)  Source: BLOOD PERIPHERAL  Preliminary Report (06 May 2018 17:25):    NO ORGANISMS ISOLATED    NO ORGANISMS ISOLATED AT 24 HOURS          RADIOLOGY & ADDITIONAL TESTS:    Imaging Personally Reviewed:        [x] Consultant(s) Notes Reviewed: vascular surgery        [] Care Discussed with Consultants/Other Providers:

## 2018-05-08 LAB
APTT BLD: 41.8 SEC — HIGH (ref 27.5–37.4)
BUN SERPL-MCNC: 64 MG/DL — HIGH (ref 7–23)
CALCIUM SERPL-MCNC: 8.5 MG/DL — SIGNIFICANT CHANGE UP (ref 8.4–10.5)
CHLORIDE SERPL-SCNC: 101 MMOL/L — SIGNIFICANT CHANGE UP (ref 98–107)
CO2 SERPL-SCNC: 20 MMOL/L — LOW (ref 22–31)
CREAT SERPL-MCNC: 3.71 MG/DL — HIGH (ref 0.5–1.3)
GLUCOSE BLDC GLUCOMTR-MCNC: 118 MG/DL — HIGH (ref 70–99)
GLUCOSE BLDC GLUCOMTR-MCNC: 144 MG/DL — HIGH (ref 70–99)
GLUCOSE BLDC GLUCOMTR-MCNC: 186 MG/DL — HIGH (ref 70–99)
GLUCOSE BLDC GLUCOMTR-MCNC: 92 MG/DL — SIGNIFICANT CHANGE UP (ref 70–99)
GLUCOSE SERPL-MCNC: 82 MG/DL — SIGNIFICANT CHANGE UP (ref 70–99)
HCT VFR BLD CALC: 23.6 % — LOW (ref 39–50)
HGB BLD-MCNC: 7.6 G/DL — LOW (ref 13–17)
INR BLD: 1.03 — SIGNIFICANT CHANGE UP (ref 0.88–1.17)
MAGNESIUM SERPL-MCNC: 2.2 MG/DL — SIGNIFICANT CHANGE UP (ref 1.6–2.6)
MCHC RBC-ENTMCNC: 27.5 PG — SIGNIFICANT CHANGE UP (ref 27–34)
MCHC RBC-ENTMCNC: 32.2 % — SIGNIFICANT CHANGE UP (ref 32–36)
MCV RBC AUTO: 85.5 FL — SIGNIFICANT CHANGE UP (ref 80–100)
NRBC # FLD: 0 — SIGNIFICANT CHANGE UP
PHOSPHATE SERPL-MCNC: 5 MG/DL — HIGH (ref 2.5–4.5)
PLATELET # BLD AUTO: 355 K/UL — SIGNIFICANT CHANGE UP (ref 150–400)
PMV BLD: 10.9 FL — SIGNIFICANT CHANGE UP (ref 7–13)
POTASSIUM SERPL-MCNC: 4.9 MMOL/L — SIGNIFICANT CHANGE UP (ref 3.5–5.3)
POTASSIUM SERPL-SCNC: 4.9 MMOL/L — SIGNIFICANT CHANGE UP (ref 3.5–5.3)
PROTHROM AB SERPL-ACNC: 11.4 SEC — SIGNIFICANT CHANGE UP (ref 9.8–13.1)
RBC # BLD: 2.76 M/UL — LOW (ref 4.2–5.8)
RBC # FLD: 15.6 % — HIGH (ref 10.3–14.5)
SODIUM SERPL-SCNC: 138 MMOL/L — SIGNIFICANT CHANGE UP (ref 135–145)
VANCOMYCIN FLD-MCNC: 14.9 UG/ML — SIGNIFICANT CHANGE UP
WBC # BLD: 7.85 K/UL — SIGNIFICANT CHANGE UP (ref 3.8–10.5)
WBC # FLD AUTO: 7.85 K/UL — SIGNIFICANT CHANGE UP (ref 3.8–10.5)

## 2018-05-08 PROCEDURE — 99233 SBSQ HOSP IP/OBS HIGH 50: CPT

## 2018-05-08 PROCEDURE — 99232 SBSQ HOSP IP/OBS MODERATE 35: CPT

## 2018-05-08 RX ORDER — AMPICILLIN SODIUM AND SULBACTAM SODIUM 250; 125 MG/ML; MG/ML
3 INJECTION, POWDER, FOR SUSPENSION INTRAMUSCULAR; INTRAVENOUS EVERY 12 HOURS
Refills: 0 | Status: DISCONTINUED | OUTPATIENT
Start: 2018-05-08 | End: 2018-05-11

## 2018-05-08 RX ORDER — VANCOMYCIN HCL 1 G
750 VIAL (EA) INTRAVENOUS ONCE
Refills: 0 | Status: COMPLETED | OUTPATIENT
Start: 2018-05-08 | End: 2018-05-08

## 2018-05-08 RX ADMIN — Medication 100 MILLIGRAM(S): at 12:02

## 2018-05-08 RX ADMIN — HEPARIN SODIUM 5000 UNIT(S): 5000 INJECTION INTRAVENOUS; SUBCUTANEOUS at 05:15

## 2018-05-08 RX ADMIN — Medication 90 MILLIGRAM(S): at 05:15

## 2018-05-08 RX ADMIN — HEPARIN SODIUM 5000 UNIT(S): 5000 INJECTION INTRAVENOUS; SUBCUTANEOUS at 22:24

## 2018-05-08 RX ADMIN — Medication 81 MILLIGRAM(S): at 12:02

## 2018-05-08 RX ADMIN — Medication 100 MILLIGRAM(S): at 05:15

## 2018-05-08 RX ADMIN — Medication 40 MILLIGRAM(S): at 17:56

## 2018-05-08 RX ADMIN — Medication 2: at 13:32

## 2018-05-08 RX ADMIN — Medication 100 MILLIGRAM(S): at 17:56

## 2018-05-08 RX ADMIN — SENNA PLUS 2 TABLET(S): 8.6 TABLET ORAL at 22:24

## 2018-05-08 RX ADMIN — TAMSULOSIN HYDROCHLORIDE 0.4 MILLIGRAM(S): 0.4 CAPSULE ORAL at 22:25

## 2018-05-08 RX ADMIN — AMPICILLIN SODIUM AND SULBACTAM SODIUM 200 GRAM(S): 250; 125 INJECTION, POWDER, FOR SUSPENSION INTRAMUSCULAR; INTRAVENOUS at 19:21

## 2018-05-08 RX ADMIN — Medication 150 MILLIGRAM(S): at 10:44

## 2018-05-08 RX ADMIN — POLYETHYLENE GLYCOL 3350 17 GRAM(S): 17 POWDER, FOR SOLUTION ORAL at 12:02

## 2018-05-08 RX ADMIN — HEPARIN SODIUM 5000 UNIT(S): 5000 INJECTION INTRAVENOUS; SUBCUTANEOUS at 13:32

## 2018-05-08 RX ADMIN — Medication 40 MILLIGRAM(S): at 05:15

## 2018-05-08 NOTE — PROGRESS NOTE ADULT - ASSESSMENT
63M s/p L fem bk pop pt bypass 3/29, readmitted for 6cm breakdown in below knee incision. Patient now s/p evacuation hematoma, ligation of below the knee popliteal artery on 5/3/18, hemodynamically stable.    - Surgical site wound culture from 05/03/18 grew Acinetobacter baumannii, Proteus mirabilis, Staph aureus; per ID S. auerus sensitivities appears MSSA, but resistant to cefazolin. Will rerun sensitivities.  - f/u ID plan regarding PICC for long term abx, depending on repeat cultures  - Follow up vancomycin level; dose by level  -Trend  BUN/Creatinine; recommends renally dosing all meds and avoiding nephrotoxic agents   - Continue regular diet  - Daily dressing changes to incision site: packing with small kerlix and betadine to toes with dressing changes  - continue neurovascular checks  - PT, OOB  - Dispo planning

## 2018-05-08 NOTE — PHYSICAL THERAPY INITIAL EVALUATION ADULT - RANGE OF MOTION EXAMINATION, REHAB EVAL
Bilateral LE ROM WFL however, right ankle ROM not assessed secondary to right BKA./bilateral upper extremity ROM was WFL (within functional limits)

## 2018-05-08 NOTE — PROGRESS NOTE ADULT - ASSESSMENT
62 y/o man with HTN, DM2, CKD stage 4, PAD s/p R BKA, recent L femoral to below knee bypass with RSVG 3/2018, course c/b acute on chronic diastoliC CHF, sent in from rehab with LLE wound dehiscence. Medicine consulted for co-management.    *LLE wound dehiscence, hematoma: s/p exploration, ligation of below the knee popliteal artery on 5/3  - appreciate ID recs  - abx per ID plan  - pain control, bowel regimen ( pt educated about need to ask for pain meds when in pain)  - management per primary vascular service  - PT    *Vision loss: reports history of vision loss, had injections prior - likely diabetic retinopathy given h/o diabetes  - recommend ophthalmology consult    *CV: HTN, h/o diastolic CHF (chronic), PAD  - 1+ pitting edema long the hips, continue to monitor  - BP currently at goal, in light of low diastolic BP  - cont Lasix 40 mg BID  - cont nifedipine XL 90 mg daily, labetalol 100 mg BID  - cont aspirin, statin  - monitor BP    *DM2: A1c 5.5 -   - continue correction scale insulin  - monitor fingersticks - currently at goal    *CKD stage 4: Cr worsening  - will recommend checking vanc level, if cr worsens f/u ID to see if Vanc can be switched ( discussed with vascular team)  - renally dose all medications  - avoid nephrotoxic agents  - monitor Cr    Thanks for the consult. Hospitalist will follow with you.

## 2018-05-08 NOTE — PROGRESS NOTE ADULT - SUBJECTIVE AND OBJECTIVE BOX
Surgery Team C (Vascular) Progress Note:    Hospital Day: 7 s/p exploration and popliteal artery repair due to previous incision dehiscence    Subjective:  No acute overnight events.  Patient seen this AM at bedside, no acute issues at this time.      Objective:  Vital Signs Last 24 Hrs  T(C): 36.7 (08 May 2018 05:14), Max: 36.8 (07 May 2018 10:01)  T(F): 98 (08 May 2018 05:14), Max: 98.3 (07 May 2018 10:01)  HR: 73 (08 May 2018 05:14) (66 - 73)  BP: 145/66 (08 May 2018 05:14) (128/54 - 145/66)  BP(mean): --  RR: 17 (08 May 2018 05:14) (17 - 19)  SpO2: 95% (08 May 2018 05:14) (94% - 97%)    Focused Physical Exam:  General: NAD  Respiratory: Nonlabored breathing  Extremities: LLE: below the knee incision with packing, + PT, DP, AT signals, lle distally warm well perfused     Medications:  MEDICATIONS  (STANDING):  aspirin enteric coated 81 milliGRAM(s) Oral daily  cefepime   IVPB 1000 milliGRAM(s) IV Intermittent every 24 hours  dextrose 5%. 1000 milliLiter(s) (50 mL/Hr) IV Continuous <Continuous>  dextrose 50% Injectable 12.5 Gram(s) IV Push once  dextrose 50% Injectable 25 Gram(s) IV Push once  dextrose 50% Injectable 25 Gram(s) IV Push once  docusate sodium 100 milliGRAM(s) Oral two times a day  furosemide    Tablet 40 milliGRAM(s) Oral two times a day  heparin  Injectable 5000 Unit(s) SubCutaneous every 8 hours  insulin lispro (HumaLOG) corrective regimen sliding scale   SubCutaneous three times a day before meals  insulin lispro (HumaLOG) corrective regimen sliding scale   SubCutaneous at bedtime  labetalol 100 milliGRAM(s) Oral two times a day  NIFEdipine XL 90 milliGRAM(s) Oral daily  polyethylene glycol 3350 17 Gram(s) Oral daily  senna 2 Tablet(s) Oral at bedtime  tamsulosin 0.4 milliGRAM(s) Oral at bedtime    MEDICATIONS  (PRN):  acetaminophen   Tablet. 650 milliGRAM(s) Oral every 6 hours PRN Mild Pain (1 - 3)  dextrose Gel 1 Dose(s) Oral once PRN Blood Glucose LESS THAN 70 milliGRAM(s)/deciliter  glucagon  Injectable 1 milliGRAM(s) IntraMuscular once PRN Glucose LESS THAN 70 milligrams/deciliter  morphine  - Injectable 2 milliGRAM(s) IV Push every 4 hours PRN Moderate Pain (4 - 6)  morphine  - Injectable 4 milliGRAM(s) IV Push every 4 hours PRN Severe Pain (7 - 10)  polyethylene glycol 3350 17 Gram(s) Oral daily PRN Constipation      LABS     CBC (05-08 @ 07:09)                              7.6<L>                         7.85    )----------------(  355        --    % Neutrophils, --    % Lymphocytes, ANC: --                                  23.6<L>              CBC (05-07 @ 05:27)                              7.3<L>                         7.79    )----------------(  323        --    % Neutrophils, --    % Lymphocytes, ANC: --                                  23.1<L>                BMP (05-07 @ 05:27)             135     |  96<L>   |  64<H> 		Ca++ --      Ca 8.1<L>             ---------------------------------( 85    		Mg 2.1                5.0     |  21<L>   |  3.99<H>			Ph 5.4<H>      Coags (05-08 @ 07:09)  aPTT 41.8<H> / INR 1.03 / PT 11.4        -> BLOOD PERIPHERAL Culture (05-05 @ 17:25)     NG    NG  NG    -> OTHER Culture (05-03 @ 18:45)       GPCPR Gram Pos Cocci in Pairs  QUANTITY OF BACTERIA SEEN: RARE (1+)  WBC^White Blood Cells  QNTY CELLS IN GRAM STAIN: RARE (1+)      GNRID^Gram Neg Nabeel To Be Identified  GNRID#[id number]^Gram [id number] To Be Identified 2  STAU^Staphylococcus aureus  NG

## 2018-05-08 NOTE — PHYSICAL THERAPY INITIAL EVALUATION ADULT - PLANNED THERAPY INTERVENTIONS, PT EVAL
stair negotiation/balance training/bed mobility training/gait training/strengthening/transfer training

## 2018-05-08 NOTE — PHYSICAL THERAPY INITIAL EVALUATION ADULT - PERTINENT HX OF CURRENT PROBLEM, REHAB EVAL
Pt. is a 63 year old male admitted to [facility] secondary to wound disruption. s/p exploration of artery. PMH: R BKA, DM,HTN

## 2018-05-08 NOTE — PROGRESS NOTE ADULT - SUBJECTIVE AND OBJECTIVE BOX
Follow Up:      Interval History/ROS: notes discomfort in right foot    Allergies  No Known Allergies    ANTIMICROBIALS:  cefepime   IVPB 1000 every 24 hours    OTHER MEDS:  MEDICATIONS  (STANDING):  acetaminophen   Tablet. 650 every 6 hours PRN  aspirin enteric coated 81 daily  dextrose 50% Injectable 12.5 once  dextrose 50% Injectable 25 once  dextrose 50% Injectable 25 once  dextrose Gel 1 once PRN  docusate sodium 100 two times a day  furosemide    Tablet 40 two times a day  glucagon  Injectable 1 once PRN  heparin  Injectable 5000 every 8 hours  insulin lispro (HumaLOG) corrective regimen sliding scale  three times a day before meals  insulin lispro (HumaLOG) corrective regimen sliding scale  at bedtime  labetalol 100 two times a day  morphine  - Injectable 2 every 4 hours PRN  morphine  - Injectable 4 every 4 hours PRN  NIFEdipine XL 90 daily  polyethylene glycol 3350 17 daily PRN  polyethylene glycol 3350 17 daily  senna 2 at bedtime  tamsulosin 0.4 at bedtime    Vital Signs Last 24 Hrs  T(C): 36.7 (08 May 2018 15:04), Max: 36.8 (08 May 2018 01:42)  T(F): 98 (08 May 2018 15:04), Max: 98.3 (08 May 2018 01:42)  HR: 72 (08 May 2018 15:04) (66 - 73)  BP: 118/53 (08 May 2018 15:04) (118/53 - 147/63)  BP(mean): --  RR: 16 (08 May 2018 15:04) (16 - 19)  SpO2: 96% (08 May 2018 15:04) (94% - 97%)    PHYSICAL EXAM:  General: WN/WD NAD, Non-toxic  Neurology: A&Ox3, nonfocal  Respiratory: no respiratory distress  Extremities: s/p Right BKA; No edema, on left foot - small amount of eschar over granulating bed of ulcers on plantar surfaces of 1st metatarsal head and hallux  Line Sites: Clear  Skin: No rash               7.6    7.85  )-----------( 355      ( 08 May 2018 07:09 )             23.6       05-08    138  |  101  |  64<H>  ----------------------------<  82  4.9   |  20<L>  |  3.71<H>      Ca    8.5      08 May 2018 07:09  Phos  5.0     05-08  Mg     2.2     05-08        MICROBIOLOGY:  BLOOD PERIPHERAL  05-05-18 --  --  --      OTHER  05-03-18 --  --  Acinetobacter baumannii  Proteus mirabilis  Staphylococcus aureus        BLOOD PERIPHERAL    Vancomycin Level, Random: 14.9 ug/mL (05-08-18 @ 07:09)    RADIOLOGY:  < from: CT Lower Extremity No Cont, Left (05.03.18 @ 11:46) >  Approximately 13.4 cm x 6.7 cm x 5.1 cm hematoma collection in deep   posterior compartment of upper calf subjacent to an upper medial calf   soft tissue defect representing site of wound dehiscence. In addition,   possibility of dehiscent distal vascular anastomotic site cannot be   excluded. Grossly unremarkable appearing proximal anastomotic site in   left groin region.    Circumferential thigh and calf subcutaneous swelling which could be   compatible with edema and/or inflammatory reaction/cellulitis. No   tracking gas collections or CT evidence for osteomyelitis.    Discussed with vascular surgery BECKI Segundo on 5/3/2018 at 1330 with read   back.    < end of copied text >      Derick Skelton MD; Division of Infectious Disease; Pager: [phone]; nights and weekends: [phone]

## 2018-05-08 NOTE — PROGRESS NOTE ADULT - ASSESSMENT
62 yo M with hx DM, CKD, HTN, R BKA, recent admission for left fem to below-knee pop bypass with RSVG (March 2018), now sent in from rehab with dehiscence of his lower extremity wound spanning approximately 4-5cm.     CT with 13 x 5 cm hematoma at site of surgery  s/p LLE hematoma exploration and ligation of below knee popliteal with distal to distal anastomosis of fem pop bypass  Afebrile.  Wound cx with Proteus, S. aureus (MSSA), and Acinetobacter baumanii  Graft is RSVG - no foreign material per vascular    Recommend:  -Discontinue cefepime 1 gram IV q 24 hours and vancomycin   UNASYN 3 grams every 12 hours (duration of therapy estimated at about 10 days- depending on status of wound)

## 2018-05-08 NOTE — PROGRESS NOTE ADULT - SUBJECTIVE AND OBJECTIVE BOX
Patient seen and examined at bedside.  Doing well w/ no new/acute complaints.      Medications:  acetaminophen   Tablet. 650 milliGRAM(s) Oral every 6 hours PRN  ampicillin/sulbactam  IVPB 3 Gram(s) IV Intermittent every 12 hours  aspirin enteric coated 81 milliGRAM(s) Oral daily  dextrose 5%. 1000 milliLiter(s) IV Continuous <Continuous>  dextrose 50% Injectable 12.5 Gram(s) IV Push once  dextrose 50% Injectable 25 Gram(s) IV Push once  dextrose 50% Injectable 25 Gram(s) IV Push once  dextrose Gel 1 Dose(s) Oral once PRN  docusate sodium 100 milliGRAM(s) Oral two times a day  furosemide    Tablet 40 milliGRAM(s) Oral two times a day  glucagon  Injectable 1 milliGRAM(s) IntraMuscular once PRN  heparin  Injectable 5000 Unit(s) SubCutaneous every 8 hours  insulin lispro (HumaLOG) corrective regimen sliding scale   SubCutaneous three times a day before meals  insulin lispro (HumaLOG) corrective regimen sliding scale   SubCutaneous at bedtime  labetalol 100 milliGRAM(s) Oral two times a day  morphine  - Injectable 2 milliGRAM(s) IV Push every 4 hours PRN  morphine  - Injectable 4 milliGRAM(s) IV Push every 4 hours PRN  NIFEdipine XL 90 milliGRAM(s) Oral daily  polyethylene glycol 3350 17 Gram(s) Oral daily PRN  polyethylene glycol 3350 17 Gram(s) Oral daily  senna 2 Tablet(s) Oral at bedtime  tamsulosin 0.4 milliGRAM(s) Oral at bedtime      PAST MEDICAL & SURGICAL HISTORY:  Kidney disease  HTN (hypertension)  DM (diabetes mellitus)  H/O peripheral artery bypass: left fem to below-knee pop with RSVG  S/P BKA (below knee amputation) unilateral, right        Vitals:  T(F): 98 (05-08), Max: 98.3 (05-08)  HR: 72 (05-08) (66 - 73)  BP: 118/53 (05-08) (118/53 - 147/63)  RR: 16 (05-08)  SpO2: 96% (05-08)  I&O's Summary    07 May 2018 07:01  -  08 May 2018 07:00  --------------------------------------------------------  IN: 0 mL / OUT: 935 mL / NET: -935 mL    08 May 2018 07:01  -  08 May 2018 17:47  --------------------------------------------------------  IN: 390 mL / OUT: 570 mL / NET: -180 mL        Physical Exam:  Appearance: No acute distress; well appearing  Eyes: PERRL, EOMI, pink conjunctiva  HENT: Normal oral mucosa  Cardiovascular: RRR, S1, S2, II/VI HIMA along sternal borders no rubs, or gallops; no edema; no JVD  Respiratory: Clear to auscultation bilaterally  Gastrointestinal: soft, non-tender, non-distended with normal bowel sounds  Musculoskeletal: No clubbing; no joint deformity.  LLE wrapped in bandages w/ no purulence/erythema   Neurologic: Non-focal  Lymphatic: No lymphadenopathy  Psychiatry: AAOx3, mood & affect appropriate  Skin: No rashes, ecchymoses, or cyanosis                          7.6    7.85  )-----------( 355      ( 08 May 2018 07:09 )             23.6     05-08    138  |  101  |  64<H>  ----------------------------<  82  4.9   |  20<L>  |  3.71<H>    Ca    8.5      08 May 2018 07:09  Phos  5.0     05-08  Mg     2.2     05-08      PT/INR - ( 08 May 2018 07:09 )   PT: 11.4 SEC;   INR: 1.03          PTT - ( 08 May 2018 07:09 )  PTT:41.8 SEC    Assessment     62yo M with hx DM, CKD, HTN, R BKA, Mild AS recent admission for left fem to below-knee pop bypass with RSVG (March 2018) presenting from rehab with dehiscence.  Pt w/ post compartment hematoma requiring surgical evacuation w/ ligation below LLE pop artery.  POD 5.  Doing well.  Euvolemic     Plan  - cont current meds  - will sign off     Call [phone] for further questions or concerns

## 2018-05-08 NOTE — PHYSICAL THERAPY INITIAL EVALUATION ADULT - MANUAL MUSCLE TESTING RESULTS, REHAB EVAL
Bilateral UE muscle strength grossly 3/5 Throughout; Bilateral LE muscle strength grossly 3/5 Throughout however, Right Ankle not assessed secondary to right BKA.

## 2018-05-08 NOTE — PHYSICAL THERAPY INITIAL EVALUATION ADULT - LEVEL OF INDEPENDENCE: SIT/STAND, REHAB EVAL
pt. deferred to perform further functional mobility secondary to wanting to rest. will assess when feasible

## 2018-05-08 NOTE — PHYSICAL THERAPY INITIAL EVALUATION ADULT - ADDITIONAL COMMENTS
Pt. lives in a pvt home with their sister and brother. Pt. has steps with HR x 1 to enter their home. Pt. was previously ambulating household distances with a rolling walker with assistance however, he reports that his primary mode of transportation is use of a wheelchair. Pt. requires assistance from ADLs. Pt. returned to bed at end of therapy session with all lines and tubes intact, call bell in reach and in NAD.

## 2018-05-08 NOTE — PROGRESS NOTE ADULT - SUBJECTIVE AND OBJECTIVE BOX
CHIEF COMPLAINT: Patient is a 63y old  male who presents with a chief complaint of RIGHT LEG SURGICAL SITE BLEEDING (02 May 2018 23:37)      SUBJECTIVE / OVERNIGHT EVENTS:    No new complaints. LLE pain, intermittent. Denies SOB. No BM x 3 days.    MEDICATIONS  (STANDING):  aspirin enteric coated 81 milliGRAM(s) Oral daily  cefepime   IVPB 1000 milliGRAM(s) IV Intermittent every 24 hours  dextrose 5%. 1000 milliLiter(s) (50 mL/Hr) IV Continuous <Continuous>  dextrose 50% Injectable 12.5 Gram(s) IV Push once  dextrose 50% Injectable 25 Gram(s) IV Push once  dextrose 50% Injectable 25 Gram(s) IV Push once  docusate sodium 100 milliGRAM(s) Oral two times a day  furosemide    Tablet 40 milliGRAM(s) Oral two times a day  heparin  Injectable 5000 Unit(s) SubCutaneous every 8 hours  insulin lispro (HumaLOG) corrective regimen sliding scale   SubCutaneous three times a day before meals  insulin lispro (HumaLOG) corrective regimen sliding scale   SubCutaneous at bedtime  labetalol 100 milliGRAM(s) Oral two times a day  NIFEdipine XL 90 milliGRAM(s) Oral daily  polyethylene glycol 3350 17 Gram(s) Oral daily  senna 2 Tablet(s) Oral at bedtime  tamsulosin 0.4 milliGRAM(s) Oral at bedtime    MEDICATIONS  (PRN):  acetaminophen   Tablet. 650 milliGRAM(s) Oral every 6 hours PRN Mild Pain (1 - 3)  dextrose Gel 1 Dose(s) Oral once PRN Blood Glucose LESS THAN 70 milliGRAM(s)/deciliter  glucagon  Injectable 1 milliGRAM(s) IntraMuscular once PRN Glucose LESS THAN 70 milligrams/deciliter  morphine  - Injectable 2 milliGRAM(s) IV Push every 4 hours PRN Moderate Pain (4 - 6)  morphine  - Injectable 4 milliGRAM(s) IV Push every 4 hours PRN Severe Pain (7 - 10)  polyethylene glycol 3350 17 Gram(s) Oral daily PRN Constipation      VITALS:  T(F): 98.2 (05-08-18 @ 11:59), Max: 98.3 (05-08-18 @ 01:42)  HR: 73 (05-08-18 @ 11:59) (66 - 73)  BP: 143/54 (05-08-18 @ 11:59) (128/54 - 147/63)  RR: 16 (05-08-18 @ 11:59) (16 - 19)  SpO2: 95% (05-08-18 @ 11:59)      CAPILLARY BLOOD GLUCOSE    Output     I&O's Summary  T(F): 98.2 (05-08-18 @ 11:59), Max: 98.3 (05-08-18 @ 01:42)  HR: 73 (05-08-18 @ 11:59) (66 - 73)  BP: 143/54 (05-08-18 @ 11:59) (128/54 - 147/63)  RR: 16 (05-08-18 @ 11:59) (16 - 19)  SpO2: 95% (05-08-18 @ 11:59)    PHYSICAL EXAM:  GENERAL: NAD, well-developed  HEAD:  Atraumatic, Normocephalic  EYES: EOMI  NECK: Supple, No JVD  CHEST/LUNG: nonlabored breathing  HEART: nl S1/S2  ABDOMEN: nondistended, soft  EXTREMITIES:  1+ pitting edema long the hips bilaterally  PSYCH: alert, answering questions appropriately  NEUROLOGY: non-focal  SKIN: No rashes noted    LABS:              7.6                  138  | 20   | 64           7.85  >-----------< 355     ------------------------< 82                    23.6                 4.9  | 101  | 3.71                                         Ca 8.5   Mg 2.2   Ph 5.0          INR: 1.03 ;    PT: 11.4 SEC;    PTT: 41.8 SEC<H>                MICROBIOLOGY:    Culture - Blood (collected 05 May 2018 17:25)  Source: BLOOD VENOUS  Preliminary Report (07 May 2018 17:25):    NO ORGANISMS ISOLATED    NO ORGANISMS ISOLATED AT 48 HRS.    Culture - Blood (collected 05 May 2018 17:25)  Source: BLOOD PERIPHERAL  Preliminary Report (07 May 2018 17:25):    NO ORGANISMS ISOLATED    NO ORGANISMS ISOLATED AT 48 HRS.          RADIOLOGY & ADDITIONAL TESTS:    Imaging Personally Reviewed:        [x] Consultant(s) Notes Reviewed:     ID note      Vascular surgery note:    63M s/p L fem bk pop pt bypass 3/29, readmitted for 6cm breakdown in below knee incision. Patient now s/p evacuation hematoma, ligation of below the knee popliteal artery on 5/3/18, hemodynamically stable.    - Surgical site wound culture from 05/03/18 grew Acinetobacter baumannii, Proteus mirabilis, Staph aureus; per ID S. auerus sensitivities appears MSSA, but resistant to cefazolin. Will rerun sensitivities.  - f/u ID plan regarding PICC for long term abx, depending on repeat cultures  - Follow up vancomycin level; dose by level  -Trend  BUN/Creatinine; recommends renally dosing all meds and avoiding nephrotoxic agents   - Continue regular diet  - Daily dressing changes to incision site: packing with small kerlix and betadine to toes with dressing changes  - continue neurovascular checks  - PT, OOB  - Dispo planningwed:          [x] Care Discussed with Consultants/Other Providers: vascular surgery

## 2018-05-09 DIAGNOSIS — T81.4XXS INFECTION FOLLOWING A PROCEDURE, SEQUELA: ICD-10-CM

## 2018-05-09 LAB
-  AMIKACIN: SIGNIFICANT CHANGE UP
-  AMIKACIN: SIGNIFICANT CHANGE UP
-  AMPICILLIN/SULBACTAM: SIGNIFICANT CHANGE UP
-  AMPICILLIN/SULBACTAM: SIGNIFICANT CHANGE UP
-  AMPICILLIN: SIGNIFICANT CHANGE UP
-  AZTREONAM: SIGNIFICANT CHANGE UP
-  CEFAZOLIN: SIGNIFICANT CHANGE UP
-  CEFAZOLIN: SIGNIFICANT CHANGE UP
-  CEFEPIME: SIGNIFICANT CHANGE UP
-  CEFEPIME: SIGNIFICANT CHANGE UP
-  CEFOXITIN: SIGNIFICANT CHANGE UP
-  CEFTAZIDIME: SIGNIFICANT CHANGE UP
-  CEFTAZIDIME: SIGNIFICANT CHANGE UP
-  CEFTRIAXONE: SIGNIFICANT CHANGE UP
-  CIPROFLOXACIN: SIGNIFICANT CHANGE UP
-  CLINDAMYCIN: SIGNIFICANT CHANGE UP
-  ERTAPENEM: SIGNIFICANT CHANGE UP
-  ERYTHROMYCIN: SIGNIFICANT CHANGE UP
-  GENTAMICIN: SIGNIFICANT CHANGE UP
-  LEVOFLOXACIN: SIGNIFICANT CHANGE UP
-  LEVOFLOXACIN: SIGNIFICANT CHANGE UP
-  MEROPENEM: SIGNIFICANT CHANGE UP
-  MEROPENEM: SIGNIFICANT CHANGE UP
-  MOXIFLOXACIN(AEROBIC): SIGNIFICANT CHANGE UP
-  OXACILLIN: SIGNIFICANT CHANGE UP
-  PENICILLIN: SIGNIFICANT CHANGE UP
-  PIPERACILLIN/TAZOBACTAM: SIGNIFICANT CHANGE UP
-  RIFAMPIN.: SIGNIFICANT CHANGE UP
-  TETRACYCLINE: SIGNIFICANT CHANGE UP
-  TOBRAMYCIN: SIGNIFICANT CHANGE UP
-  TOBRAMYCIN: SIGNIFICANT CHANGE UP
-  TRIMETHOPRIM/SULFAMETHOXAZOLE: SIGNIFICANT CHANGE UP
-  TRIMETHOPRIM/SULFAMETHOXAZOLE: SIGNIFICANT CHANGE UP
-  VANCOMYCIN: SIGNIFICANT CHANGE UP
ALBUMIN SERPL ELPH-MCNC: 3 G/DL — LOW (ref 3.3–5)
ALP SERPL-CCNC: 97 U/L — SIGNIFICANT CHANGE UP (ref 40–120)
ALT FLD-CCNC: 23 U/L — SIGNIFICANT CHANGE UP (ref 4–41)
APTT BLD: 31.8 SEC — SIGNIFICANT CHANGE UP (ref 27.5–37.4)
AST SERPL-CCNC: 21 U/L — SIGNIFICANT CHANGE UP (ref 4–40)
BILIRUB SERPL-MCNC: 0.3 MG/DL — SIGNIFICANT CHANGE UP (ref 0.2–1.2)
BLD GP AB SCN SERPL QL: NEGATIVE — SIGNIFICANT CHANGE UP
BUN SERPL-MCNC: 59 MG/DL — HIGH (ref 7–23)
BUN SERPL-MCNC: 62 MG/DL — HIGH (ref 7–23)
CALCIUM SERPL-MCNC: 8.7 MG/DL — SIGNIFICANT CHANGE UP (ref 8.4–10.5)
CALCIUM SERPL-MCNC: 9 MG/DL — SIGNIFICANT CHANGE UP (ref 8.4–10.5)
CHLORIDE SERPL-SCNC: 101 MMOL/L — SIGNIFICANT CHANGE UP (ref 98–107)
CHLORIDE SERPL-SCNC: 102 MMOL/L — SIGNIFICANT CHANGE UP (ref 98–107)
CK MB BLD-MCNC: 1.48 NG/ML — SIGNIFICANT CHANGE UP (ref 1–6.6)
CK MB BLD-MCNC: SIGNIFICANT CHANGE UP (ref 0–2.5)
CK SERPL-CCNC: 53 U/L — SIGNIFICANT CHANGE UP (ref 30–200)
CO2 SERPL-SCNC: 22 MMOL/L — SIGNIFICANT CHANGE UP (ref 22–31)
CO2 SERPL-SCNC: 24 MMOL/L — SIGNIFICANT CHANGE UP (ref 22–31)
CREAT SERPL-MCNC: 3.28 MG/DL — HIGH (ref 0.5–1.3)
CREAT SERPL-MCNC: 3.39 MG/DL — HIGH (ref 0.5–1.3)
CULTURE - SURGICAL SITE: SIGNIFICANT CHANGE UP
GLUCOSE BLDC GLUCOMTR-MCNC: 104 MG/DL — HIGH (ref 70–99)
GLUCOSE BLDC GLUCOMTR-MCNC: 133 MG/DL — HIGH (ref 70–99)
GLUCOSE BLDC GLUCOMTR-MCNC: 134 MG/DL — HIGH (ref 70–99)
GLUCOSE BLDC GLUCOMTR-MCNC: 98 MG/DL — SIGNIFICANT CHANGE UP (ref 70–99)
GLUCOSE SERPL-MCNC: 78 MG/DL — SIGNIFICANT CHANGE UP (ref 70–99)
GLUCOSE SERPL-MCNC: 89 MG/DL — SIGNIFICANT CHANGE UP (ref 70–99)
HCT VFR BLD CALC: 24 % — LOW (ref 39–50)
HCT VFR BLD CALC: 25.1 % — LOW (ref 39–50)
HGB BLD-MCNC: 7.7 G/DL — LOW (ref 13–17)
HGB BLD-MCNC: 8.2 G/DL — LOW (ref 13–17)
INR BLD: 0.97 — SIGNIFICANT CHANGE UP (ref 0.88–1.17)
LACTATE SERPL-SCNC: 0.5 MMOL/L — SIGNIFICANT CHANGE UP (ref 0.5–2)
MAGNESIUM SERPL-MCNC: 2.3 MG/DL — SIGNIFICANT CHANGE UP (ref 1.6–2.6)
MAGNESIUM SERPL-MCNC: 2.3 MG/DL — SIGNIFICANT CHANGE UP (ref 1.6–2.6)
MCHC RBC-ENTMCNC: 27.1 PG — SIGNIFICANT CHANGE UP (ref 27–34)
MCHC RBC-ENTMCNC: 27.6 PG — SIGNIFICANT CHANGE UP (ref 27–34)
MCHC RBC-ENTMCNC: 32.1 % — SIGNIFICANT CHANGE UP (ref 32–36)
MCHC RBC-ENTMCNC: 32.7 % — SIGNIFICANT CHANGE UP (ref 32–36)
MCV RBC AUTO: 84.5 FL — SIGNIFICANT CHANGE UP (ref 80–100)
MCV RBC AUTO: 84.5 FL — SIGNIFICANT CHANGE UP (ref 80–100)
NRBC # FLD: 0 — SIGNIFICANT CHANGE UP
NRBC # FLD: 0 — SIGNIFICANT CHANGE UP
PHOSPHATE SERPL-MCNC: 4.5 MG/DL — SIGNIFICANT CHANGE UP (ref 2.5–4.5)
PHOSPHATE SERPL-MCNC: 4.5 MG/DL — SIGNIFICANT CHANGE UP (ref 2.5–4.5)
PLATELET # BLD AUTO: 361 K/UL — SIGNIFICANT CHANGE UP (ref 150–400)
PLATELET # BLD AUTO: 384 K/UL — SIGNIFICANT CHANGE UP (ref 150–400)
PMV BLD: 10.3 FL — SIGNIFICANT CHANGE UP (ref 7–13)
PMV BLD: 10.7 FL — SIGNIFICANT CHANGE UP (ref 7–13)
POTASSIUM SERPL-MCNC: 4.6 MMOL/L — SIGNIFICANT CHANGE UP (ref 3.5–5.3)
POTASSIUM SERPL-MCNC: 4.8 MMOL/L — SIGNIFICANT CHANGE UP (ref 3.5–5.3)
POTASSIUM SERPL-SCNC: 4.6 MMOL/L — SIGNIFICANT CHANGE UP (ref 3.5–5.3)
POTASSIUM SERPL-SCNC: 4.8 MMOL/L — SIGNIFICANT CHANGE UP (ref 3.5–5.3)
PROT SERPL-MCNC: 7.3 G/DL — SIGNIFICANT CHANGE UP (ref 6–8.3)
PROTHROM AB SERPL-ACNC: 11.1 SEC — SIGNIFICANT CHANGE UP (ref 9.8–13.1)
RBC # BLD: 2.84 M/UL — LOW (ref 4.2–5.8)
RBC # BLD: 2.97 M/UL — LOW (ref 4.2–5.8)
RBC # FLD: 15.4 % — HIGH (ref 10.3–14.5)
RBC # FLD: 15.5 % — HIGH (ref 10.3–14.5)
RH IG SCN BLD-IMP: POSITIVE — SIGNIFICANT CHANGE UP
SODIUM SERPL-SCNC: 137 MMOL/L — SIGNIFICANT CHANGE UP (ref 135–145)
SODIUM SERPL-SCNC: 139 MMOL/L — SIGNIFICANT CHANGE UP (ref 135–145)
TROPONIN T SERPL-MCNC: 0.13 NG/ML — HIGH (ref 0–0.06)
VANCOMYCIN FLD-MCNC: 18.3 UG/ML — SIGNIFICANT CHANGE UP
WBC # BLD: 6.26 K/UL — SIGNIFICANT CHANGE UP (ref 3.8–10.5)
WBC # BLD: 6.5 K/UL — SIGNIFICANT CHANGE UP (ref 3.8–10.5)
WBC # FLD AUTO: 6.26 K/UL — SIGNIFICANT CHANGE UP (ref 3.8–10.5)
WBC # FLD AUTO: 6.5 K/UL — SIGNIFICANT CHANGE UP (ref 3.8–10.5)

## 2018-05-09 PROCEDURE — 99233 SBSQ HOSP IP/OBS HIGH 50: CPT

## 2018-05-09 PROCEDURE — 99232 SBSQ HOSP IP/OBS MODERATE 35: CPT

## 2018-05-09 PROCEDURE — 74018 RADEX ABDOMEN 1 VIEW: CPT | Mod: 26

## 2018-05-09 PROCEDURE — 93010 ELECTROCARDIOGRAM REPORT: CPT

## 2018-05-09 RX ORDER — ONDANSETRON 8 MG/1
4 TABLET, FILM COATED ORAL ONCE
Refills: 0 | Status: COMPLETED | OUTPATIENT
Start: 2018-05-09 | End: 2018-05-09

## 2018-05-09 RX ADMIN — Medication 40 MILLIGRAM(S): at 17:50

## 2018-05-09 RX ADMIN — TAMSULOSIN HYDROCHLORIDE 0.4 MILLIGRAM(S): 0.4 CAPSULE ORAL at 22:13

## 2018-05-09 RX ADMIN — AMPICILLIN SODIUM AND SULBACTAM SODIUM 200 GRAM(S): 250; 125 INJECTION, POWDER, FOR SUSPENSION INTRAMUSCULAR; INTRAVENOUS at 17:50

## 2018-05-09 RX ADMIN — Medication 90 MILLIGRAM(S): at 06:41

## 2018-05-09 RX ADMIN — Medication 100 MILLIGRAM(S): at 06:42

## 2018-05-09 RX ADMIN — Medication 100 MILLIGRAM(S): at 00:09

## 2018-05-09 RX ADMIN — SENNA PLUS 2 TABLET(S): 8.6 TABLET ORAL at 22:13

## 2018-05-09 RX ADMIN — Medication 100 MILLIGRAM(S): at 13:01

## 2018-05-09 RX ADMIN — HEPARIN SODIUM 5000 UNIT(S): 5000 INJECTION INTRAVENOUS; SUBCUTANEOUS at 22:12

## 2018-05-09 RX ADMIN — MORPHINE SULFATE 4 MILLIGRAM(S): 50 CAPSULE, EXTENDED RELEASE ORAL at 07:15

## 2018-05-09 RX ADMIN — AMPICILLIN SODIUM AND SULBACTAM SODIUM 200 GRAM(S): 250; 125 INJECTION, POWDER, FOR SUSPENSION INTRAMUSCULAR; INTRAVENOUS at 06:41

## 2018-05-09 RX ADMIN — Medication 40 MILLIGRAM(S): at 06:42

## 2018-05-09 RX ADMIN — HEPARIN SODIUM 5000 UNIT(S): 5000 INJECTION INTRAVENOUS; SUBCUTANEOUS at 13:01

## 2018-05-09 RX ADMIN — MORPHINE SULFATE 4 MILLIGRAM(S): 50 CAPSULE, EXTENDED RELEASE ORAL at 06:42

## 2018-05-09 RX ADMIN — ONDANSETRON 4 MILLIGRAM(S): 8 TABLET, FILM COATED ORAL at 10:37

## 2018-05-09 RX ADMIN — Medication 81 MILLIGRAM(S): at 13:01

## 2018-05-09 RX ADMIN — HEPARIN SODIUM 5000 UNIT(S): 5000 INJECTION INTRAVENOUS; SUBCUTANEOUS at 06:41

## 2018-05-09 RX ADMIN — Medication 100 MILLIGRAM(S): at 17:50

## 2018-05-09 NOTE — DIETITIAN INITIAL EVALUATION ADULT. - ETIOLOGY
related to decreased ability to consume sufficient energy, physiological causes increasing nutrient needs

## 2018-05-09 NOTE — DIETITIAN INITIAL EVALUATION ADULT. - OTHER INFO
Initial Dietitian Evaluation 2/2 to extended length of stay. Per medical record patient with medical history of HTN, DM2, CKD stage 4, PAD s/p R BKA, recent L femoral to below knee bypass with RSVG 3/2018, course c/b acute on chronic diastolic CHF, sent in from rehab with LLE wound dehiscence. S/p exploration of LLE artery (5/03). Patient reports good appetite PTA. NKFA. No chewing/swallowing difficulty reported. HbA1c 5.5 (5/03). Patient c/o nausea in-house. Reports consuming </=50% of meals in-house at this time. Ordered for Nepro supplement. Intake of supplement varies.

## 2018-05-09 NOTE — PROGRESS NOTE ADULT - ASSESSMENT
62 yo M with hx DM, CKD, HTN, R BKA, recent admission for left fem to below-knee pop bypass with RSVG (March 2018), now sent in from rehab with dehiscence of his lower extremity wound spanning approximately 4-5cm.     CT with 13 x 5 cm hematoma at site of surgery  s/p LLE hematoma exploration and ligation of below knee popliteal with distal to distal anastomosis of fem pop bypass  Afebrile.  Wound cx with Proteus, S. aureus (MSSA), and Acinetobacter baumanii  Graft is RSVG - no foreign material per vascular  Discussed with Vascular team at bedside - wound is clean and progressing    Recommend:  Continue UNASYN 3 grams every 12 hours 5/8 -->    (duration of therapy estimated at about 10 days- depending on status of wound

## 2018-05-09 NOTE — PROGRESS NOTE ADULT - ASSESSMENT
64 y/o man with HTN, DM2, CKD stage 4, PAD s/p R BKA, recent L femoral to below knee bypass with RSVG 3/2018, course c/b acute on chronic diastoliC CHF, sent in from rehab with LLE wound dehiscence. Medicine consulted for co-management.    *LLE wound dehiscence, hematoma: s/p exploration, ligation of below the knee popliteal artery on 5/3  - appreciate ID recs  - abx per ID plan  - pain control, bowel regimen ( pt educated about need to ask for pain meds when in pain)  - management per primary vascular service  - PT    *Vision loss: reports history of vision loss, had injections prior - likely diabetic retinopathy given h/o diabetes  - recommend ophthalmology consult    *CV: HTN, h/o diastolic CHF (chronic), PAD  - 1+ pitting edema long the hips, continue to monitor  - BP currently at goal, in light of low diastolic BP  - cont Lasix 40 mg BID  - cont nifedipine XL 90 mg daily, labetalol 100 mg BID  - cont aspirin, statin  - monitor BP    *DM2: A1c 5.5 -   - continue correction scale insulin  - monitor fingersticks - currently at goal    *CKD stage 4: Cr worsening  - renally dose all medications  - avoid nephrotoxic agents  - monitor Cr    Thanks for the consult. Hospitalist will follow with you.

## 2018-05-09 NOTE — PROGRESS NOTE ADULT - ASSESSMENT
63M s/p L fem bk pop pt bypass 3/29, readmitted for 6cm breakdown in below knee incision. Patient now s/p evacuation hematoma, ligation of below the knee popliteal artery on 5/3/18, hemodynamically stable.    - Surgical site wound culture from 05/03/18 grew Acinetobacter baumannii, Proteus mirabilis, Staph aureus; per ID continue unasyn 3 g q12 x 10 days  - Plan for PICC placement today for outpatient antibiotics  - Trend  BUN/Creatinine; recommends renally dosing all meds and avoiding nephrotoxic agents   - Continue regular diet  - Daily dressing changes to incision site: packing with small kerlix and betadine to toes with dressing changes  - Continue neurovascular checks  - PT, encourage OOB/IS  - DISPO: plan dc after PICC placement

## 2018-05-09 NOTE — PROGRESS NOTE ADULT - SUBJECTIVE AND OBJECTIVE BOX
CHIEF COMPLAINT: Patient is a 63y old  male who presents with a chief complaint of RIGHT LEG SURGICAL SITE BLEEDING (02 May 2018 23:37)      SUBJECTIVE / OVERNIGHT EVENTS:    No complaints.    MEDICATIONS  (STANDING):  ampicillin/sulbactam  IVPB 3 Gram(s) IV Intermittent every 12 hours  aspirin enteric coated 81 milliGRAM(s) Oral daily  dextrose 5%. 1000 milliLiter(s) (50 mL/Hr) IV Continuous <Continuous>  dextrose 50% Injectable 12.5 Gram(s) IV Push once  dextrose 50% Injectable 25 Gram(s) IV Push once  dextrose 50% Injectable 25 Gram(s) IV Push once  docusate sodium 100 milliGRAM(s) Oral two times a day  furosemide    Tablet 40 milliGRAM(s) Oral two times a day  heparin  Injectable 5000 Unit(s) SubCutaneous every 8 hours  insulin lispro (HumaLOG) corrective regimen sliding scale   SubCutaneous three times a day before meals  insulin lispro (HumaLOG) corrective regimen sliding scale   SubCutaneous at bedtime  labetalol 100 milliGRAM(s) Oral two times a day  NIFEdipine XL 90 milliGRAM(s) Oral daily  polyethylene glycol 3350 17 Gram(s) Oral daily  senna 2 Tablet(s) Oral at bedtime  tamsulosin 0.4 milliGRAM(s) Oral at bedtime    MEDICATIONS  (PRN):  acetaminophen   Tablet. 650 milliGRAM(s) Oral every 6 hours PRN Mild Pain (1 - 3)  dextrose Gel 1 Dose(s) Oral once PRN Blood Glucose LESS THAN 70 milliGRAM(s)/deciliter  glucagon  Injectable 1 milliGRAM(s) IntraMuscular once PRN Glucose LESS THAN 70 milligrams/deciliter  morphine  - Injectable 2 milliGRAM(s) IV Push every 4 hours PRN Moderate Pain (4 - 6)  morphine  - Injectable 4 milliGRAM(s) IV Push every 4 hours PRN Severe Pain (7 - 10)  polyethylene glycol 3350 17 Gram(s) Oral daily PRN Constipation      VITALS:  T(F): 97.5 (05-09-18 @ 09:58), Max: 98.7 (05-09-18 @ 00:07)  HR: 66 (05-09-18 @ 09:58) (66 - 72)  BP: 154/68 (05-09-18 @ 09:58) (118/53 - 154/68)  RR: 16 (05-09-18 @ 09:58) (16 - 18)  SpO2: 100% (05-09-18 @ 09:58)      CAPILLARY BLOOD GLUCOSE    Output     I&O's Summary  T(F): 97.5 (05-09-18 @ 09:58), Max: 98.7 (05-09-18 @ 00:07)  HR: 66 (05-09-18 @ 09:58) (66 - 72)  BP: 154/68 (05-09-18 @ 09:58) (118/53 - 154/68)  RR: 16 (05-09-18 @ 09:58) (16 - 18)  SpO2: 100% (05-09-18 @ 09:58)    PHYSICAL EXAM:  GENERAL: middle-aged man lying in bed in NAD  HEAD:  Atraumatic, Normocephalic  EYES: EOMI  NECK: Supple, No JVD  CHEST/LUNG: nonlabored breathing  HEART: nl S1/S2  ABDOMEN: nondistended, soft  PSYCH: alert, answering questions appropriately  SKIN: No rashes noted    LABS:              7.7                  139  | 22   | 62           6.50  >-----------< 361     ------------------------< 78                    24.0                 4.6  | 101  | 3.39                                         Ca 8.7   Mg 2.3   Ph 4.5          INR: 0.97 ;    PT: 11.1 SEC;    PTT: 31.8 SEC                MICROBIOLOGY:        RADIOLOGY & ADDITIONAL TESTS:    Imaging Personally Reviewed:        [x] Consultant(s) Notes Reviewed: vascular surgery, ID, cardiology    [] Care Discussed with Consultants/Other Providers:

## 2018-05-09 NOTE — PROGRESS NOTE ADULT - SUBJECTIVE AND OBJECTIVE BOX
Surgery Team C (Vascular) Progress Note:    Subjective: Patient seen and examined at bedside. Pain is well controlled, no acute issues overnight.    Objective:  Vital Signs Last 24 Hrs  T(C): 36.4 (09 May 2018 09:58), Max: 37.1 (09 May 2018 00:07)  T(F): 97.5 (09 May 2018 09:58), Max: 98.7 (09 May 2018 00:07)  HR: 66 (09 May 2018 09:58) (66 - 73)  BP: 154/68 (09 May 2018 09:58) (118/53 - 154/68)  BP(mean): --  RR: 16 (09 May 2018 09:58) (16 - 18)  SpO2: 100% (09 May 2018 09:58) (95% - 100%)    Focused Physical Exam:  General: NAD  Respiratory: Nonlabored breathing  Extremities: LLE: below the knee incision with packing, + PT, DP, AT signals, lle distally warm well perfused     Medications:  MEDICATIONS  (STANDING):  ampicillin/sulbactam  IVPB 3 Gram(s) IV Intermittent every 12 hours  aspirin enteric coated 81 milliGRAM(s) Oral daily  dextrose 5%. 1000 milliLiter(s) (50 mL/Hr) IV Continuous <Continuous>  dextrose 50% Injectable 12.5 Gram(s) IV Push once  dextrose 50% Injectable 25 Gram(s) IV Push once  dextrose 50% Injectable 25 Gram(s) IV Push once  docusate sodium 100 milliGRAM(s) Oral two times a day  furosemide    Tablet 40 milliGRAM(s) Oral two times a day  heparin  Injectable 5000 Unit(s) SubCutaneous every 8 hours  insulin lispro (HumaLOG) corrective regimen sliding scale   SubCutaneous three times a day before meals  insulin lispro (HumaLOG) corrective regimen sliding scale   SubCutaneous at bedtime  labetalol 100 milliGRAM(s) Oral two times a day  NIFEdipine XL 90 milliGRAM(s) Oral daily  polyethylene glycol 3350 17 Gram(s) Oral daily  senna 2 Tablet(s) Oral at bedtime  tamsulosin 0.4 milliGRAM(s) Oral at bedtime    MEDICATIONS  (PRN):  acetaminophen   Tablet. 650 milliGRAM(s) Oral every 6 hours PRN Mild Pain (1 - 3)  dextrose Gel 1 Dose(s) Oral once PRN Blood Glucose LESS THAN 70 milliGRAM(s)/deciliter  glucagon  Injectable 1 milliGRAM(s) IntraMuscular once PRN Glucose LESS THAN 70 milligrams/deciliter  morphine  - Injectable 2 milliGRAM(s) IV Push every 4 hours PRN Moderate Pain (4 - 6)  morphine  - Injectable 4 milliGRAM(s) IV Push every 4 hours PRN Severe Pain (7 - 10)  polyethylene glycol 3350 17 Gram(s) Oral daily PRN Constipation          LABS:    CBC (05-09 @ 06:15)                              7.7<L>                         6.50    )----------------(  361        --    % Neutrophils, --    % Lymphocytes, ANC: --                                  24.0<L>  CBC (05-08 @ 07:09)                              7.6<L>                         7.85    )----------------(  355        --    % Neutrophils, --    % Lymphocytes, ANC: --                                  23.6<L>    BMP (05-09 @ 06:15)             139     |  101     |  62<H> 		Ca++ --      Ca 8.7                ---------------------------------( 78    		Mg 2.3                4.6     |  22      |  3.39<H>			Ph 4.5     BMP (05-08 @ 07:09)             138     |  101     |  64<H> 		Ca++ --      Ca 8.5                ---------------------------------( 82    		Mg 2.2                4.9     |  20<L>   |  3.71<H>			Ph 5.0<H>      Coags (05-09 @ 06:15)  aPTT 31.8 / INR 0.97 / PT 11.1  Coags (05-08 @ 07:09)  aPTT 41.8<H> / INR 1.03 / PT 11.4

## 2018-05-09 NOTE — DIETITIAN INITIAL EVALUATION ADULT. - DIET TYPE
Nepro 2x daily (850 kcals, 38.2g protein)./no concentrated potassium/no concentrated phosphorus/consistent carbohydrate renal with evening snacks

## 2018-05-09 NOTE — DIETITIAN INITIAL EVALUATION ADULT. - PHYSICAL APPEARANCE
right BKA, Nutrition focused physical exam conducted - found signs of malnutrition [ ]absent [x]present   Subcutaneous fat loss: [ ] Orbital fat pads region, [ ]Buccal fat region, [MODERATE]Triceps region,  [ ]Ribs region  Muscle wasting: [MODERATE]Temples region, [MILD]Clavicle region, [MILD]Shoulder region, [ ]Scapula region, [ ]Interosseous region,  [ ]thigh region, [ ]Calf region

## 2018-05-09 NOTE — DIETITIAN INITIAL EVALUATION ADULT. - NS AS NUTRI INTERV MEALS SNACK
1. Change diet to Consistent Carbohydrate Renal w/ evening snack, No concentrated Potassium, No concentrated Phosphorous with Nepro 2x daily (850 kcals, 38.2g protein). 2. Please Encourage po intake, assist with meals and menu selections, provide alternatives PRN. 3. Recommend multivitamin daily. 4. Monitor weights, labs, BM's, skin integrity, p.o. intake./General/healthful diet

## 2018-05-09 NOTE — CHART NOTE - NSCHARTNOTEFT_GEN_A_CORE
Pre-Interventional Radiology Procedure Note    63y    Male    Procedure: PICC    Diagnosis/Indication: Patient is a 63y old  Male who presents with a chief complaint of RIGHT LEG SURGICAL SITE BLEEDING (02 May 2018 23:37)      Interventional Radiology Attending Physician:    Ordering Attending Physician: Alisia    PAST MEDICAL & SURGICAL HISTORY:  Kidney disease  HTN (hypertension)  DM (diabetes mellitus)  H/O peripheral artery bypass: left fem to below-knee pop with RSVG  S/P BKA (below knee amputation) unilateral, right       CBC Full  -  ( 09 May 2018 06:15 )  WBC Count : 6.50 K/uL  Hemoglobin : 7.7 g/dL  Hematocrit : 24.0 %  Platelet Count - Automated : 361 K/uL  Mean Cell Volume : 84.5 fL  Mean Cell Hemoglobin : 27.1 pg  Mean Cell Hemoglobin Concentration : 32.1 %  Auto Neutrophil # : x  Auto Lymphocyte # : x  Auto Monocyte # : x  Auto Eosinophil # : x  Auto Basophil # : x  Auto Neutrophil % : x  Auto Lymphocyte % : x  Auto Monocyte % : x  Auto Eosinophil % : x  Auto Basophil % : x    05-09    139  |  101  |  62<H>  ----------------------------<  78  4.6   |  22  |  3.39<H>    Ca    8.7      09 May 2018 06:15  Phos  4.5     05-09  Mg     2.3     05-09      PT/INR - ( 09 May 2018 06:15 )   PT: 11.1 SEC;   INR: 0.97          PTT - ( 09 May 2018 06:15 )  PTT:31.8 SEC
NUTRITION SERVICES                                                                                  MALNUTRITION ALERT     Attention Health Care Provider: Upon nutritional assessment by the Registered Dietitian your patient was determined to meet criteria / has evidence of the following diagnosis/diagnoses:    [ ] Mild Protein Calorie Malnutrition   [ ] Moderate Protein Calorie Malnutrition   [x] Severe Protein Calorie Malnutrition   [ ] Unspecified Protein Calorie Malnutrition   [ ] Underweight / BMI <19  [ ] Morbid Obesity / BMI >40      By signing this assessment you are acknowledging the diagnosis/diagnoses.       PLAN OF CARE: Refer to Initial Dietitian Evaluation or Nutrition Follow-Up Documentation for Nutritional Recommendations.
Procedure: left lower extremity hematoma exploration, ligation of below the knee popliteal artery     Diagnosis/Indication: hematoma of left lower extremity s/p fem-pop bypass    Surgeon: Dr. Crump    S: Pt endorses minimal pain in the leg. Patient has no acute complaint. Patient is moving his right lower extremity spontaneously.     O:  T(C): --  T(F): --  HR: 77 (05-03-18 @ 23:00) (75 - 78)  BP: 124/50 (05-03-18 @ 23:00) (119/48 - 134/64)  RR: 14 (05-03-18 @ 23:00) (13 - 16)  SpO2: 99% (05-03-18 @ 23:00) (95% - 100%)  Wt(kg): --    Gen: resting in bed, no distress  C/V: vital sign stable on monitor  Pulm: unlabored breathing on room air   Right lower extremity: motor function intact, reduced sensation but patient states he has really bad diabetes, leg and foot warm to touch, PT dopplable -- same as immediately post-op. Exploration site dressing intact without strike through. Foot is wrapped and dressing is clean and intact      63y Male s/p left lower extremity hematoma exploration, ligation of below the knee popliteal artery for hematoma of left lower extremity s/p fem-pop bypass. Patient tolerated the procedure well with dopplable PT unchanged since immediate post-op. H/H 8.1/24.4 from 7.3/22.5 pre-op.     Plan:  continue regular diet   Continue antibiotics  Need repacking the surgical site tomorrow first and then paint the toes during foot dressing change

## 2018-05-09 NOTE — DIETITIAN INITIAL EVALUATION ADULT. - ENERGY NEEDS
Weight: 150# (68kg) (5/03)  Height: 66 inches  BMI: 25.5kg/m^2 (adjusted for right BKA)  IBW: 134# (61kg) (adjusted for Right BKA) +/-10%

## 2018-05-09 NOTE — PROGRESS NOTE ADULT - SUBJECTIVE AND OBJECTIVE BOX
Follow Up:      Interval History/ROS: nauseated, malaise    Allergies  No Known Allergies    ANTIMICROBIALS:  ampicillin/sulbactam  IVPB 3 every 12 hours    OTHER MEDS:  MEDICATIONS  (STANDING):  acetaminophen   Tablet. 650 every 6 hours PRN  aspirin enteric coated 81 daily  dextrose 50% Injectable 12.5 once  dextrose 50% Injectable 25 once  dextrose 50% Injectable 25 once  dextrose Gel 1 once PRN  docusate sodium 100 two times a day  furosemide    Tablet 40 two times a day  glucagon  Injectable 1 once PRN  heparin  Injectable 5000 every 8 hours  insulin lispro (HumaLOG) corrective regimen sliding scale  three times a day before meals  insulin lispro (HumaLOG) corrective regimen sliding scale  at bedtime  labetalol 100 two times a day  NIFEdipine XL 90 daily  polyethylene glycol 3350 17 daily  polyethylene glycol 3350 17 daily PRN  senna 2 at bedtime  tamsulosin 0.4 at bedtime      Vital Signs Last 24 Hrs  T(C): 36.5 (09 May 2018 13:02), Max: 37.1 (09 May 2018 00:07)  T(F): 97.7 (09 May 2018 13:02), Max: 98.7 (09 May 2018 00:07)  HR: 64 (09 May 2018 13:02) (64 - 72)  BP: 137/67 (09 May 2018 13:02) (118/53 - 154/68)  BP(mean): --  RR: 15 (09 May 2018 13:02) (15 - 18)  SpO2: 99% (09 May 2018 13:02) (96% - 100%)    PHYSICAL EXAM:  General: uncomfortable, NAD, Non-toxic  Neurology: A&Ox3, nonfocal  Respiratory: no resp distress  Line Sites: Clear  Skin: No rash                   7.7    6.50  )-----------( 361      ( 09 May 2018 06:15 )             24.0       05-09    139  |  101  |  62<H>  ----------------------------<  78  4.6   |  22  |  3.39<H>    Ca    8.7      09 May 2018 06:15  Phos  4.5     05-09  Mg     2.3     05-09      MICROBIOLOGY:  BLOOD PERIPHERAL  05-05-18 --  --  --      OTHER  05-03-18 --  --  Acinetobacter baumannii  Proteus mirabilis  Staphylococcus aureus    Vancomycin Level, Random: 18.3 ug/mL (05-09-18 @ 06:15)    RADIOLOGY:  < from: CT Lower Extremity No Cont, Left (05.03.18 @ 11:46) >  Approximately 13.4 cm x 6.7 cm x 5.1 cm hematoma collection in deep   posterior compartment of upper calf subjacent to an upper medial calf   soft tissue defect representing site of wound dehiscence. In addition,   possibility of dehiscent distal vascular anastomotic site cannot be   excluded. Grossly unremarkable appearing proximal anastomotic site in   left groin region.    Circumferential thigh and calf subcutaneous swelling which could be   compatible with edema and/or inflammatory reaction/cellulitis. No   tracking gas collections or CT evidence for osteomyelitis.    < end of copied text >      Derick Skelton MD; Division of Infectious Disease; Pager: [phone]; nights and weekends: [phone]

## 2018-05-10 ENCOUNTER — TRANSCRIPTION ENCOUNTER (OUTPATIENT)
Age: 63
End: 2018-05-10

## 2018-05-10 LAB
BACTERIA BLD CULT: SIGNIFICANT CHANGE UP
BACTERIA BLD CULT: SIGNIFICANT CHANGE UP
BUN SERPL-MCNC: 54 MG/DL — HIGH (ref 7–23)
CALCIUM SERPL-MCNC: 8.9 MG/DL — SIGNIFICANT CHANGE UP (ref 8.4–10.5)
CHLORIDE SERPL-SCNC: 101 MMOL/L — SIGNIFICANT CHANGE UP (ref 98–107)
CK MB BLD-MCNC: 1.51 NG/ML — SIGNIFICANT CHANGE UP (ref 1–6.6)
CK MB BLD-MCNC: SIGNIFICANT CHANGE UP (ref 0–2.5)
CK SERPL-CCNC: 52 U/L — SIGNIFICANT CHANGE UP (ref 30–200)
CO2 SERPL-SCNC: 24 MMOL/L — SIGNIFICANT CHANGE UP (ref 22–31)
CREAT SERPL-MCNC: 3.15 MG/DL — HIGH (ref 0.5–1.3)
GLUCOSE BLDC GLUCOMTR-MCNC: 112 MG/DL — HIGH (ref 70–99)
GLUCOSE BLDC GLUCOMTR-MCNC: 131 MG/DL — HIGH (ref 70–99)
GLUCOSE BLDC GLUCOMTR-MCNC: 150 MG/DL — HIGH (ref 70–99)
GLUCOSE BLDC GLUCOMTR-MCNC: 99 MG/DL — SIGNIFICANT CHANGE UP (ref 70–99)
GLUCOSE SERPL-MCNC: 76 MG/DL — SIGNIFICANT CHANGE UP (ref 70–99)
HCT VFR BLD CALC: 25.9 % — LOW (ref 39–50)
HGB BLD-MCNC: 8.3 G/DL — LOW (ref 13–17)
MAGNESIUM SERPL-MCNC: 2.3 MG/DL — SIGNIFICANT CHANGE UP (ref 1.6–2.6)
MCHC RBC-ENTMCNC: 27.4 PG — SIGNIFICANT CHANGE UP (ref 27–34)
MCHC RBC-ENTMCNC: 32 % — SIGNIFICANT CHANGE UP (ref 32–36)
MCV RBC AUTO: 85.5 FL — SIGNIFICANT CHANGE UP (ref 80–100)
NRBC # FLD: 0 — SIGNIFICANT CHANGE UP
PHOSPHATE SERPL-MCNC: 4.3 MG/DL — SIGNIFICANT CHANGE UP (ref 2.5–4.5)
PLATELET # BLD AUTO: 394 K/UL — SIGNIFICANT CHANGE UP (ref 150–400)
PMV BLD: 10.8 FL — SIGNIFICANT CHANGE UP (ref 7–13)
POTASSIUM SERPL-MCNC: 4.8 MMOL/L — SIGNIFICANT CHANGE UP (ref 3.5–5.3)
POTASSIUM SERPL-SCNC: 4.8 MMOL/L — SIGNIFICANT CHANGE UP (ref 3.5–5.3)
RBC # BLD: 3.03 M/UL — LOW (ref 4.2–5.8)
RBC # FLD: 15.5 % — HIGH (ref 10.3–14.5)
SODIUM SERPL-SCNC: 138 MMOL/L — SIGNIFICANT CHANGE UP (ref 135–145)
TROPONIN T SERPL-MCNC: 0.11 NG/ML — HIGH (ref 0–0.06)
WBC # BLD: 5.7 K/UL — SIGNIFICANT CHANGE UP (ref 3.8–10.5)
WBC # FLD AUTO: 5.7 K/UL — SIGNIFICANT CHANGE UP (ref 3.8–10.5)

## 2018-05-10 PROCEDURE — 76937 US GUIDE VASCULAR ACCESS: CPT | Mod: 26

## 2018-05-10 PROCEDURE — 36569 INSJ PICC 5 YR+ W/O IMAGING: CPT

## 2018-05-10 PROCEDURE — 99233 SBSQ HOSP IP/OBS HIGH 50: CPT

## 2018-05-10 PROCEDURE — 77001 FLUOROGUIDE FOR VEIN DEVICE: CPT | Mod: 26,GC

## 2018-05-10 RX ORDER — HYDRALAZINE HCL 50 MG
10 TABLET ORAL ONCE
Refills: 0 | Status: COMPLETED | OUTPATIENT
Start: 2018-05-10 | End: 2018-05-10

## 2018-05-10 RX ORDER — CHLORHEXIDINE GLUCONATE 213 G/1000ML
1 SOLUTION TOPICAL DAILY
Refills: 0 | Status: DISCONTINUED | OUTPATIENT
Start: 2018-05-10 | End: 2018-05-10

## 2018-05-10 RX ORDER — CHLORHEXIDINE GLUCONATE 213 G/1000ML
1 SOLUTION TOPICAL
Refills: 0 | Status: DISCONTINUED | OUTPATIENT
Start: 2018-05-10 | End: 2018-05-11

## 2018-05-10 RX ADMIN — Medication 40 MILLIGRAM(S): at 17:14

## 2018-05-10 RX ADMIN — Medication 40 MILLIGRAM(S): at 05:45

## 2018-05-10 RX ADMIN — HEPARIN SODIUM 5000 UNIT(S): 5000 INJECTION INTRAVENOUS; SUBCUTANEOUS at 22:35

## 2018-05-10 RX ADMIN — SENNA PLUS 2 TABLET(S): 8.6 TABLET ORAL at 22:35

## 2018-05-10 RX ADMIN — Medication 100 MILLIGRAM(S): at 01:02

## 2018-05-10 RX ADMIN — HEPARIN SODIUM 5000 UNIT(S): 5000 INJECTION INTRAVENOUS; SUBCUTANEOUS at 05:45

## 2018-05-10 RX ADMIN — Medication 100 MILLIGRAM(S): at 05:45

## 2018-05-10 RX ADMIN — Medication 100 MILLIGRAM(S): at 17:14

## 2018-05-10 RX ADMIN — HEPARIN SODIUM 5000 UNIT(S): 5000 INJECTION INTRAVENOUS; SUBCUTANEOUS at 14:56

## 2018-05-10 RX ADMIN — Medication 90 MILLIGRAM(S): at 05:45

## 2018-05-10 RX ADMIN — Medication 81 MILLIGRAM(S): at 11:07

## 2018-05-10 RX ADMIN — AMPICILLIN SODIUM AND SULBACTAM SODIUM 200 GRAM(S): 250; 125 INJECTION, POWDER, FOR SUSPENSION INTRAMUSCULAR; INTRAVENOUS at 17:14

## 2018-05-10 RX ADMIN — Medication 100 MILLIGRAM(S): at 11:07

## 2018-05-10 RX ADMIN — Medication 100 MILLIGRAM(S): at 22:35

## 2018-05-10 RX ADMIN — TAMSULOSIN HYDROCHLORIDE 0.4 MILLIGRAM(S): 0.4 CAPSULE ORAL at 22:35

## 2018-05-10 RX ADMIN — Medication 10 MILLIGRAM(S): at 07:51

## 2018-05-10 RX ADMIN — AMPICILLIN SODIUM AND SULBACTAM SODIUM 200 GRAM(S): 250; 125 INJECTION, POWDER, FOR SUSPENSION INTRAMUSCULAR; INTRAVENOUS at 05:45

## 2018-05-10 RX ADMIN — Medication 10 MILLIGRAM(S): at 19:03

## 2018-05-10 NOTE — DISCHARGE NOTE ADULT - MEDICATION SUMMARY - MEDICATIONS TO TAKE
I will START or STAY ON the medications listed below when I get home from the hospital:    acetaminophen 325 mg oral tablet  -- 2 tab(s) by mouth every 6 hours, As needed, Moderate Pain (4 - 6)  -- Indication: For mild pain    oxyCODONE-acetaminophen 5 mg-325 mg oral tablet  -- 1 tab(s) by mouth every 4 hours, As needed, Moderate Pain  -- Indication: For moderate pain    oxyCODONE-acetaminophen 5 mg-325 mg oral tablet  -- 2 tab(s) by mouth every 8 hours, As needed, Severe Pain (7 - 10)  -- Indication: For severe pain    aspirin 81 mg oral delayed release tablet  -- 1 tab(s) by mouth once a day  -- Indication: For antiplatelet    tamsulosin 0.4 mg oral capsule  -- 1 cap(s) by mouth once a day (at bedtime)  -- Indication: For BPH    heparin  -- 5000 unit(s) subcutaneous every 8 hours  -- Indication: For Dvt prophylaxis    atorvastatin 40 mg oral tablet  -- 1 tab(s) by mouth once a day  -- Indication: For Hyperlipidemia    labetalol 100 mg oral tablet  -- 1 tab(s) by mouth 2 times a day  -- Indication: For Hypertension    NIFEdipine 60 mg oral tablet, extended release  -- 2 tab(s) by mouth once a day (at bedtime)  -- Indication: For Hypertension    furosemide 40 mg oral tablet  -- 1 tab(s) by mouth 2 times a day  -- Indication: For Hypertension    senna oral tablet  -- 2 tab(s) by mouth once a day (at bedtime)  -- Indication: For constipation    docusate sodium 100 mg oral capsule  -- 1 cap(s) by mouth 2 times a day  -- Indication: For constipation    polyethylene glycol 3350 oral powder for reconstitution  -- 17 gram(s) by mouth once a day  -- Indication: For constipation    ampicillin-sulbactam  -- 3 gram(s) intravenous 2 times a day  -- Indication: For antibiotic    calcium acetate 667 mg oral tablet  -- 3 tab(s) by mouth 3 times a day  -- Indication: For supplement    Multiple Vitamins oral tablet  -- 1 tab(s) by mouth once a day  -- Indication: For supplement    Vitamin B-12 1000 mcg oral tablet  -- 1 tab(s) by mouth once a day  -- Indication: For supplement I will START or STAY ON the medications listed below when I get home from the hospital:    acetaminophen 325 mg oral tablet  -- 2 tab(s) by mouth every 6 hours, As needed, Moderate Pain (4 - 6)  -- Indication: For mild pain    oxyCODONE-acetaminophen 5 mg-325 mg oral tablet  -- 1 tab(s) by mouth every 4 hours, As needed, Moderate Pain  -- Indication: For moderate pain    oxyCODONE-acetaminophen 5 mg-325 mg oral tablet  -- 2 tab(s) by mouth every 8 hours, As needed, Severe Pain (7 - 10)  -- Indication: For severe pain    aspirin 81 mg oral delayed release tablet  -- 1 tab(s) by mouth once a day  -- Indication: For antiplatelet    tamsulosin 0.4 mg oral capsule  -- 1 cap(s) by mouth once a day (at bedtime)  -- Indication: For BPH    heparin  -- 5000 unit(s) subcutaneous every 8 hours  -- Indication: For DVT prophylaxis    Januvia 25 mg oral tablet  -- 1 tab(s) by mouth once a day  -- Indication: For Diabetes    atorvastatin 40 mg oral tablet  -- 1 tab(s) by mouth once a day  -- Indication: For Hyperlipidemia    labetalol 100 mg oral tablet  -- 1 tab(s) by mouth 2 times a day  -- Indication: For Hypertension    NIFEdipine 60 mg oral tablet, extended release  -- 2 tab(s) by mouth once a day (at bedtime)  -- Indication: For Hypertension    NIFEdipine 60 mg oral tablet, extended release  -- 2 tab(s) by mouth once a day (at bedtime)  -- Indication: For Hypertension    furosemide 40 mg oral tablet  -- 1 tab(s) by mouth 2 times a day  -- Indication: For Hypertension    senna oral tablet  -- 2 tab(s) by mouth once a day (at bedtime)  -- Indication: For constipation    docusate sodium 100 mg oral capsule  -- 1 cap(s) by mouth 2 times a day  -- Indication: For constipation    polyethylene glycol 3350 oral powder for reconstitution  -- 17 gram(s) by mouth once a day  -- Indication: For constipation    ampicillin-sulbactam  -- 3 gram(s) intravenous 2 times a day  -- Indication: For antibiotic    calcium acetate 667 mg oral tablet  -- 3 tab(s) by mouth 3 times a day  -- Indication: For supplement    Multiple Vitamins oral tablet  -- 1 tab(s) by mouth once a day  -- Indication: For supplement    Vitamin B-12 1000 mcg oral tablet  -- 1 tab(s) by mouth once a day  -- Indication: For supplement

## 2018-05-10 NOTE — DISCHARGE NOTE ADULT - ADDITIONAL INSTRUCTIONS
Follow up with Ophthalmology Clinic at [address] [place] [address], [address], [place]: [phone] Call to follow up with Dr. Crump (vascular surgery) in 10-14 days  Call to follow up with Dr. Skelton (infectious disease) in 5-7 days  Follow up with Ophthalmology Clinic at [address] [place] [address], [address], [place]: [phone]

## 2018-05-10 NOTE — DISCHARGE NOTE ADULT - MEDICATION SUMMARY - MEDICATIONS TO STOP TAKING
I will STOP taking the medications listed below when I get home from the hospital:    Nifedical XL 30 mg oral tablet, extended release  -- 1 tab(s) by mouth once a day    sodium hypochlorite 0.25% topical solution  -- 1 application on skin once a day    collagenase 250 units/g topical ointment  -- 1 application on skin once a day    pentoxifylline 400 mg oral tablet, extended release  -- 1 tab(s) by mouth 3 times a day

## 2018-05-10 NOTE — DISCHARGE NOTE ADULT - CARE PROVIDERS DIRECT ADDRESSES
[email or web address]

## 2018-05-10 NOTE — PROGRESS NOTE ADULT - ASSESSMENT
64 y/o man with HTN, DM2, CKD stage 4, PAD s/p R BKA, recent L femoral to below knee bypass with RSVG 3/2018, course c/b acute on chronic diastoliC CHF, sent in from rehab with LLE wound dehiscence. Medicine consulted for co-management.    *LLE wound dehiscence, hematoma: s/p exploration, ligation of below the knee popliteal artery on 5/3  - appreciate ID recs  - abx per ID plan  - pain control, bowel regimen  - management per primary vascular service  - PT    *Vision loss: reports history of vision loss, had injections prior - likely diabetic retinopathy given h/o diabetes  - recommend ophthalmology consult    *CV: HTN, h/o diastolic CHF (chronic), PAD  - BP elevated overnight, then improved after AM meds  - continue to monitor BP for now  - 1+ pitting edema long the hips, continue to monitor  - cont Lasix 40 mg BID  - cont nifedipine XL 90 mg daily, labetalol 100 mg BID  - cont aspirin, statin    *DM2: A1c 5.5 -   - continue correction scale insulin  - monitor fingersticks - currently at goal    *CKD stage 4: Cr worsening  - renally dose all medications  - avoid nephrotoxic agents  - monitor Cr    Thanks for the consult. Hospitalist will follow with you.

## 2018-05-10 NOTE — DISCHARGE NOTE ADULT - CARE PLAN
Principal Discharge DX:	Wound dehiscence  Goal:	return to normal activity  Assessment and plan of treatment:	- Continue daily packing of left medial calf wound. Cover with kerlix and ACE bandage.  - Continue daily dressing changes to left foot with betadine, clean dressing in between toes, and wrapped in kerlix.  - Call to make an appointment to follow up with Dr. Crump in 10-14 days  Secondary Diagnosis:	Wound infection after surgery, sequela  Assessment and plan of treatment:	- Continue Unasyn 3g q12 hours through 5/18  - Make an appointment to follow up with Infectious Disease in 5-7 days to monitor progress of wound healing  Secondary Diagnosis:	HTN (hypertension)  Assessment and plan of treatment:	- Continue nifedipine, labetalol, lasix. Follow up with your primary care provider for further management.  Secondary Diagnosis:	DM (diabetes mellitus)  Assessment and plan of treatment:	Continue insulin sliding scale. Follow up with your primary care provider for further management. Principal Discharge DX:	Wound dehiscence  Goal:	return to normal activity  Assessment and plan of treatment:	- Continue daily packing of left medial calf wound with small kerlix. Cover with kerlix and ACE bandage.  - Continue daily dressing changes to left foot with betadine, clean dressing in between toes, and wrapped in kerlix.  - Call to make an appointment to follow up with Dr. Crump in 10-14 days  Secondary Diagnosis:	Wound infection after surgery, sequela  Assessment and plan of treatment:	- Continue Unasyn 3g q12 hours through 5/18  - Make an appointment to follow up with Infectious Disease in 5-7 days to monitor progress of wound healing  Secondary Diagnosis:	HTN (hypertension)  Assessment and plan of treatment:	- Continue nifedipine, labetalol, lasix. Follow up with your primary care provider for further management.  Secondary Diagnosis:	DM (diabetes mellitus)  Assessment and plan of treatment:	Continue Januvia 25 mg daily. Follow up with your primary care provider for further management.

## 2018-05-10 NOTE — DISCHARGE NOTE ADULT - CARE PROVIDER_API CALL
Maximiliano Crump), Vascular Surgery  [address]  [address]  [place] [address]  Phone: [phone]  Fax: [phone]    Derick Skelton), Geriatric Medicine; Infectious Disease; Internal Medicine  [address] [place] [address]  Phone: [phone]  Fax: [phone]

## 2018-05-10 NOTE — PROGRESS NOTE ADULT - ATTENDING COMMENTS
Guero Escobedo MD  Pager [phone]  After 5pm/weekends call [phone]
as above
as above   will need ov f/u 2 weeks

## 2018-05-10 NOTE — DISCHARGE NOTE ADULT - HOSPITAL COURSE
63 year old male with a recent admission for left femoral to below the knee popliteal bypass with RSVG (3/2018) presented to [facility] ED on 5/2/18 from rehab with dehiscence of his left lower extremity spanning approximately 4-5 cm. CT of the LLE showed a "large hematoma collection  in deep posterior compartment of upper calf subjacent to an upper medial calf soft tissue defect representing site of wound dehiscence" and possible blowout from infection. He was also found to be anemic and transfused 2 units PRBCs. Patient admitted to vascular surgery and started on vanco/zosyn. On 5/3 patient went to OR for LLE hematoma exploration, ligation of below the knee popliteal artery (distal to distal anastomosis of fem-pop bypass). Patient tolerated procedure well. Post operatively patient started on a regular diet, and dressings were changed daily. 63 year old male with a recent admission for left femoral to below the knee popliteal bypass with RSVG (3/2018) presented to [facility] ED on 5/2/18 from rehab with dehiscence of his left lower extremity spanning approximately 4-5 cm. CT of the LLE showed a "large hematoma collection  in deep posterior compartment of upper calf subjacent to an upper medial calf soft tissue defect representing site of wound dehiscence" and possible blowout from infection. He was also found to be anemic and transfused 2 units PRBCs. Patient admitted to vascular surgery and started on vanco/zosyn. On 5/3 patient went to OR for LLE hematoma exploration, ligation of below the knee popliteal artery (distal to distal anastomosis of fem-pop bypass). Patient tolerated procedure well. Post operatively patient started on a regular diet, and dressings were changed daily. Awaited culture results and antibiotics were changed to unasyn, vanco and zosyn discontinued. Patient got a PICC for outpatient/long term antibiotics. On 5/11/18, rehab bed available. Patient tolerating a regular diet, pain is controlled with PO medications, he is working with physical therapy. Discussed with attending, patient stable for discharge with outpatient follow up.

## 2018-05-10 NOTE — PROGRESS NOTE ADULT - SUBJECTIVE AND OBJECTIVE BOX
CHIEF COMPLAINT: Patient is a 63y old  male who presents with a chief complaint of RIGHT LEG SURGICAL SITE BLEEDING (02 May 2018 23:37)      SUBJECTIVE / OVERNIGHT EVENTS:    Denies pain. Denies SOB. Continues to complain of poor vision in R eye, chronic.    MEDICATIONS  (STANDING):  ampicillin/sulbactam  IVPB 3 Gram(s) IV Intermittent every 12 hours  aspirin enteric coated 81 milliGRAM(s) Oral daily  dextrose 5%. 1000 milliLiter(s) (50 mL/Hr) IV Continuous <Continuous>  dextrose 50% Injectable 12.5 Gram(s) IV Push once  dextrose 50% Injectable 25 Gram(s) IV Push once  dextrose 50% Injectable 25 Gram(s) IV Push once  docusate sodium 100 milliGRAM(s) Oral two times a day  furosemide    Tablet 40 milliGRAM(s) Oral two times a day  heparin  Injectable 5000 Unit(s) SubCutaneous every 8 hours  insulin lispro (HumaLOG) corrective regimen sliding scale   SubCutaneous three times a day before meals  insulin lispro (HumaLOG) corrective regimen sliding scale   SubCutaneous at bedtime  labetalol 100 milliGRAM(s) Oral two times a day  NIFEdipine XL 90 milliGRAM(s) Oral daily  polyethylene glycol 3350 17 Gram(s) Oral daily  senna 2 Tablet(s) Oral at bedtime  tamsulosin 0.4 milliGRAM(s) Oral at bedtime    MEDICATIONS  (PRN):  acetaminophen   Tablet. 650 milliGRAM(s) Oral every 6 hours PRN Mild Pain (1 - 3)  dextrose Gel 1 Dose(s) Oral once PRN Blood Glucose LESS THAN 70 milliGRAM(s)/deciliter  glucagon  Injectable 1 milliGRAM(s) IntraMuscular once PRN Glucose LESS THAN 70 milligrams/deciliter  polyethylene glycol 3350 17 Gram(s) Oral daily PRN Constipation      VITALS:  T(F): 97.9 (05-10-18 @ 10:56), Max: 98.5 (05-09-18 @ 20:46)  HR: 71 (05-10-18 @ 10:56) (60 - 71)  BP: 132/44 (05-10-18 @ 10:56) (132/44 - 184/73)  RR: 17 (05-10-18 @ 10:56) (15 - 18)  SpO2: 95% (05-10-18 @ 10:56)      CAPILLARY BLOOD GLUCOSE    Output     I&O's Summary  T(F): 97.9 (05-10-18 @ 10:56), Max: 98.5 (05-09-18 @ 20:46)  HR: 71 (05-10-18 @ 10:56) (60 - 71)  BP: 132/44 (05-10-18 @ 10:56) (132/44 - 184/73)  RR: 17 (05-10-18 @ 10:56) (15 - 18)  SpO2: 95% (05-10-18 @ 10:56)    PHYSICAL EXAM:  GENERAL: NAD, well-developed  HEAD:  Atraumatic, Normocephalic  EYES: EOMI  NECK: Supple, No JVD  CHEST/LUNG: nonlabored breathing  HEART: nl S1/S2  ABDOMEN: nondistended, soft  EXTREMITIES:  1+ pitting dependent edema  PSYCH: alert, answering questions appropriately  SKIN: No rashes noted    LABS:              8.3                  138  | 24   | 54           5.70  >-----------< 394     ------------------------< 76                    25.9                 4.8  | 101  | 3.15                                         Ca 8.9   Mg 2.3   Ph 4.3         TPro  7.3  /  Alb  3.0      TBili  0.3  /  DBili  x         AST  21  /  ALT  23            AlkPhos  97      INR: 0.97 ;    PT: 11.1 SEC;    PTT: 31.8 SEC    CARDIAC MARKERS  0.11 ng/mL / 52 u/L / 1.51 ng/mL  CARDIAC MARKERS  0.13 ng/mL / 53 u/L / 1.48 ng/mL              MICROBIOLOGY:        RADIOLOGY & ADDITIONAL TESTS:    Imaging Personally Reviewed:    < from: Xray Abdomen 1 View PORTABLE -Urgent (05.09.18 @ 15:08) >  IMPRESSION:    Nonobstructive bowel gas pattern. Left pleural effusion.    < end of copied text >      [x] Consultant(s) Notes Reviewed: vascular, ID    [x] Care Discussed with Consultants/Other Providers: vascular surgery

## 2018-05-10 NOTE — DISCHARGE NOTE ADULT - PATIENT PORTAL LINK FT
You can access the [facility] Patient Portal, offered by [facility], by registering with the following website: [email or web address]

## 2018-05-10 NOTE — DISCHARGE NOTE ADULT - PLAN OF CARE
- Continue nifedipine, labetalol, lasix. Follow up with your primary care provider for further management. - Continue Unasyn 3g q12 hours through 5/18  - Make an appointment to follow up with Infectious Disease in 5-7 days to monitor progress of wound healing return to normal activity - Continue daily packing of left medial calf wound. Cover with kerlix and ACE bandage.  - Continue daily dressing changes to left foot with betadine, clean dressing in between toes, and wrapped in kerlix.  - Call to make an appointment to follow up with Dr. Crump in 10-14 days Continue insulin sliding scale. Follow up with your primary care provider for further management. - Continue daily packing of left medial calf wound with small kerlix. Cover with kerlix and ACE bandage.  - Continue daily dressing changes to left foot with betadine, clean dressing in between toes, and wrapped in kerlix.  - Call to make an appointment to follow up with Dr. Crump in 10-14 days Continue Januvia 25 mg daily. Follow up with your primary care provider for further management.

## 2018-05-10 NOTE — PROGRESS NOTE ADULT - SUBJECTIVE AND OBJECTIVE BOX
Surgery Team C (Vascular) Progress Note:    Subjective: Patient seen and examined at bedside. Pain is well controlled. CE trending down overnight.    Objective:  Vital Signs Last 24 Hrs  T(C): 36.6 (10 May 2018 10:56), Max: 36.9 (09 May 2018 20:46)  T(F): 97.9 (10 May 2018 10:56), Max: 98.5 (09 May 2018 20:46)  HR: 71 (10 May 2018 10:56) (60 - 71)  BP: 132/44 (10 May 2018 10:56) (132/44 - 184/73)  BP(mean): --  RR: 17 (10 May 2018 10:56) (15 - 18)  SpO2: 95% (10 May 2018 10:56) (95% - 100%)      Focused Physical Exam:  General: NAD  Respiratory: Nonlabored breathing  Extremities: LLE: below the knee incision with packing, + PT, DP, AT signals, lle distally warm well perfused     Medications:  MEDICATIONS  (STANDING):  ampicillin/sulbactam  IVPB 3 Gram(s) IV Intermittent every 12 hours  aspirin enteric coated 81 milliGRAM(s) Oral daily  dextrose 5%. 1000 milliLiter(s) (50 mL/Hr) IV Continuous <Continuous>  dextrose 50% Injectable 12.5 Gram(s) IV Push once  dextrose 50% Injectable 25 Gram(s) IV Push once  dextrose 50% Injectable 25 Gram(s) IV Push once  docusate sodium 100 milliGRAM(s) Oral two times a day  furosemide    Tablet 40 milliGRAM(s) Oral two times a day  heparin  Injectable 5000 Unit(s) SubCutaneous every 8 hours  insulin lispro (HumaLOG) corrective regimen sliding scale   SubCutaneous three times a day before meals  insulin lispro (HumaLOG) corrective regimen sliding scale   SubCutaneous at bedtime  labetalol 100 milliGRAM(s) Oral two times a day  NIFEdipine XL 90 milliGRAM(s) Oral daily  polyethylene glycol 3350 17 Gram(s) Oral daily  senna 2 Tablet(s) Oral at bedtime  tamsulosin 0.4 milliGRAM(s) Oral at bedtime    MEDICATIONS  (PRN):  acetaminophen   Tablet. 650 milliGRAM(s) Oral every 6 hours PRN Mild Pain (1 - 3)  dextrose Gel 1 Dose(s) Oral once PRN Blood Glucose LESS THAN 70 milliGRAM(s)/deciliter  glucagon  Injectable 1 milliGRAM(s) IntraMuscular once PRN Glucose LESS THAN 70 milligrams/deciliter  polyethylene glycol 3350 17 Gram(s) Oral daily PRN Constipation          LABS:  CBC (05-10 @ 05:45)                              8.3<L>                         5.70    )----------------(  394        --    % Neutrophils, --    % Lymphocytes, ANC: --                                  25.9<L>  CBC (05-09 @ 13:20)                              8.2<L>                         6.26    )----------------(  384        --    % Neutrophils, --    % Lymphocytes, ANC: --                                  25.1<L>    BMP (05-10 @ 05:45)             138     |  101     |  54<H> 		Ca++ --      Ca 8.9                ---------------------------------( 76    		Mg 2.3                4.8     |  24      |  3.15<H>			Ph 4.3     BMP (05-09 @ 13:20)             137     |  102     |  59<H> 		Ca++ --      Ca 9.0                ---------------------------------( 89    		Mg 2.3                4.8     |  24      |  3.28<H>			Ph 4.5       LFTs (05-09 @ 13:20)      TPro 7.3 / Alb 3.0<L> / TBili 0.3 / DBili -- / AST 21 / ALT 23 / AlkPhos 97    Coags (05-09 @ 06:15)  aPTT 31.8 / INR 0.97 / PT 11.1    Cardiac Markers (05-09 @ 23:39)     Trop: 0.11 -- / CKMB: 1.51 / CK: 52  Cardiac Markers (05-09 @ 13:20)     Trop: 0.13 -- / CKMB: 1.48 / CK: 53    ABG (05-09 @ 13:20)      /  /  /  /  / %     Lactate:  0.5

## 2018-05-10 NOTE — PROGRESS NOTE ADULT - ASSESSMENT
63M s/p L fem bk pop pt bypass 3/29, readmitted for 6cm breakdown in below knee incision. Patient now s/p evacuation hematoma, ligation of below the knee popliteal artery on 5/3/18, hemodynamically stable.    - Surgical site wound culture from 05/03/18 grew Acinetobacter baumannii, Proteus mirabilis, Staph aureus; per ID continue unasyn 3 g q12 x 10 days  - Plan for PICC placement today for outpatient antibiotics  - Trend  BUN/Creatinine; recommends renally dosing all meds and avoiding nephrotoxic agents   - Continue regular diet  - Daily dressing changes to incision site: packing with small kerlix and betadine to toes with dressing changes  - Nausea/somewhat altered mental status in afternoon yesterday, full workup grossly negative. CE likely elevated 2/2 kidney disease, will continue to monitor.  - Continue neurovascular checks  - PT, encourage OOB/IS  - DISPO: plan dc after PICC placement

## 2018-05-11 VITALS — DIASTOLIC BLOOD PRESSURE: 56 MMHG | HEART RATE: 67 BPM | SYSTOLIC BLOOD PRESSURE: 148 MMHG

## 2018-05-11 LAB
BUN SERPL-MCNC: 51 MG/DL — HIGH (ref 7–23)
CALCIUM SERPL-MCNC: 8.9 MG/DL — SIGNIFICANT CHANGE UP (ref 8.4–10.5)
CHLORIDE SERPL-SCNC: 101 MMOL/L — SIGNIFICANT CHANGE UP (ref 98–107)
CO2 SERPL-SCNC: 20 MMOL/L — LOW (ref 22–31)
CREAT SERPL-MCNC: 3 MG/DL — HIGH (ref 0.5–1.3)
GLUCOSE BLDC GLUCOMTR-MCNC: 109 MG/DL — HIGH (ref 70–99)
GLUCOSE BLDC GLUCOMTR-MCNC: 91 MG/DL — SIGNIFICANT CHANGE UP (ref 70–99)
GLUCOSE BLDC GLUCOMTR-MCNC: 91 MG/DL — SIGNIFICANT CHANGE UP (ref 70–99)
GLUCOSE SERPL-MCNC: 72 MG/DL — SIGNIFICANT CHANGE UP (ref 70–99)
HCT VFR BLD CALC: 25.6 % — LOW (ref 39–50)
HGB BLD-MCNC: 8 G/DL — LOW (ref 13–17)
MAGNESIUM SERPL-MCNC: 2.2 MG/DL — SIGNIFICANT CHANGE UP (ref 1.6–2.6)
MCHC RBC-ENTMCNC: 27 PG — SIGNIFICANT CHANGE UP (ref 27–34)
MCHC RBC-ENTMCNC: 31.3 % — LOW (ref 32–36)
MCV RBC AUTO: 86.5 FL — SIGNIFICANT CHANGE UP (ref 80–100)
NRBC # FLD: 0 — SIGNIFICANT CHANGE UP
PHOSPHATE SERPL-MCNC: 4.2 MG/DL — SIGNIFICANT CHANGE UP (ref 2.5–4.5)
PLATELET # BLD AUTO: 344 K/UL — SIGNIFICANT CHANGE UP (ref 150–400)
PMV BLD: 11.3 FL — SIGNIFICANT CHANGE UP (ref 7–13)
POTASSIUM SERPL-MCNC: 5.1 MMOL/L — SIGNIFICANT CHANGE UP (ref 3.5–5.3)
POTASSIUM SERPL-SCNC: 5.1 MMOL/L — SIGNIFICANT CHANGE UP (ref 3.5–5.3)
RBC # BLD: 2.96 M/UL — LOW (ref 4.2–5.8)
RBC # FLD: 15.5 % — HIGH (ref 10.3–14.5)
SODIUM SERPL-SCNC: 136 MMOL/L — SIGNIFICANT CHANGE UP (ref 135–145)
WBC # BLD: 5.88 K/UL — SIGNIFICANT CHANGE UP (ref 3.8–10.5)
WBC # FLD AUTO: 5.88 K/UL — SIGNIFICANT CHANGE UP (ref 3.8–10.5)

## 2018-05-11 PROCEDURE — 99232 SBSQ HOSP IP/OBS MODERATE 35: CPT

## 2018-05-11 PROCEDURE — 99233 SBSQ HOSP IP/OBS HIGH 50: CPT

## 2018-05-11 RX ORDER — SITAGLIPTIN 50 MG/1
1 TABLET, FILM COATED ORAL
Qty: 30 | Refills: 0
Start: 2018-05-11 | End: 2018-06-09

## 2018-05-11 RX ORDER — NIFEDIPINE 30 MG
1 TABLET, EXTENDED RELEASE 24 HR ORAL
Qty: 0 | Refills: 0 | DISCHARGE
Start: 2018-05-11

## 2018-05-11 RX ORDER — INSULIN LISPRO 100/ML
2 VIAL (ML) SUBCUTANEOUS
Qty: 1 | Refills: 0
Start: 2018-05-11

## 2018-05-11 RX ORDER — SENNA PLUS 8.6 MG/1
2 TABLET ORAL
Qty: 0 | Refills: 0 | DISCHARGE
Start: 2018-05-11

## 2018-05-11 RX ORDER — FUROSEMIDE 40 MG
1 TABLET ORAL
Qty: 0 | Refills: 0 | DISCHARGE
Start: 2018-05-11

## 2018-05-11 RX ORDER — NIFEDIPINE 30 MG
2 TABLET, EXTENDED RELEASE 24 HR ORAL
Qty: 0 | Refills: 0 | DISCHARGE
Start: 2018-05-11

## 2018-05-11 RX ORDER — DOCUSATE SODIUM 100 MG
1 CAPSULE ORAL
Qty: 0 | Refills: 0 | DISCHARGE
Start: 2018-05-11

## 2018-05-11 RX ORDER — HEPARIN SODIUM 5000 [USP'U]/ML
5000 INJECTION INTRAVENOUS; SUBCUTANEOUS
Qty: 0 | Refills: 0 | DISCHARGE
Start: 2018-05-11

## 2018-05-11 RX ORDER — LABETALOL HCL 100 MG
2 TABLET ORAL
Qty: 0 | Refills: 0 | DISCHARGE
Start: 2018-05-11

## 2018-05-11 RX ORDER — SITAGLIPTIN 50 MG/1
1 TABLET, FILM COATED ORAL
Qty: 28 | Refills: 0
Start: 2018-05-11

## 2018-05-11 RX ORDER — NIFEDIPINE 30 MG
30 TABLET, EXTENDED RELEASE 24 HR ORAL AT BEDTIME
Refills: 0 | Status: COMPLETED | OUTPATIENT
Start: 2018-05-11 | End: 2018-05-11

## 2018-05-11 RX ORDER — NIFEDIPINE 30 MG
120 TABLET, EXTENDED RELEASE 24 HR ORAL AT BEDTIME
Refills: 0 | Status: DISCONTINUED | OUTPATIENT
Start: 2018-05-12 | End: 2018-05-11

## 2018-05-11 RX ORDER — LABETALOL HCL 100 MG
1 TABLET ORAL
Qty: 0 | Refills: 0 | DISCHARGE
Start: 2018-05-11

## 2018-05-11 RX ORDER — AMPICILLIN SODIUM AND SULBACTAM SODIUM 250; 125 MG/ML; MG/ML
3 INJECTION, POWDER, FOR SUSPENSION INTRAMUSCULAR; INTRAVENOUS
Qty: 0 | Refills: 0 | DISCHARGE
Start: 2018-05-11

## 2018-05-11 RX ADMIN — Medication 100 MILLIGRAM(S): at 10:26

## 2018-05-11 RX ADMIN — HEPARIN SODIUM 5000 UNIT(S): 5000 INJECTION INTRAVENOUS; SUBCUTANEOUS at 06:05

## 2018-05-11 RX ADMIN — Medication 100 MILLIGRAM(S): at 06:07

## 2018-05-11 RX ADMIN — CHLORHEXIDINE GLUCONATE 1 APPLICATION(S): 213 SOLUTION TOPICAL at 17:47

## 2018-05-11 RX ADMIN — Medication 40 MILLIGRAM(S): at 06:07

## 2018-05-11 RX ADMIN — POLYETHYLENE GLYCOL 3350 17 GRAM(S): 17 POWDER, FOR SOLUTION ORAL at 13:29

## 2018-05-11 RX ADMIN — Medication 30 MILLIGRAM(S): at 20:49

## 2018-05-11 RX ADMIN — Medication 100 MILLIGRAM(S): at 20:49

## 2018-05-11 RX ADMIN — CHLORHEXIDINE GLUCONATE 1 APPLICATION(S): 213 SOLUTION TOPICAL at 06:09

## 2018-05-11 RX ADMIN — AMPICILLIN SODIUM AND SULBACTAM SODIUM 200 GRAM(S): 250; 125 INJECTION, POWDER, FOR SUSPENSION INTRAMUSCULAR; INTRAVENOUS at 17:49

## 2018-05-11 RX ADMIN — AMPICILLIN SODIUM AND SULBACTAM SODIUM 200 GRAM(S): 250; 125 INJECTION, POWDER, FOR SUSPENSION INTRAMUSCULAR; INTRAVENOUS at 06:04

## 2018-05-11 RX ADMIN — Medication 90 MILLIGRAM(S): at 06:07

## 2018-05-11 RX ADMIN — Medication 40 MILLIGRAM(S): at 18:19

## 2018-05-11 RX ADMIN — HEPARIN SODIUM 5000 UNIT(S): 5000 INJECTION INTRAVENOUS; SUBCUTANEOUS at 13:29

## 2018-05-11 RX ADMIN — Medication 81 MILLIGRAM(S): at 13:29

## 2018-05-11 NOTE — PROVIDER CONTACT NOTE (OTHER) - ACTION/TREATMENT ORDERED:
PA aware. ordered zofran
PA made aware. PA will come see patient
Will order hydralazine. Continue to monitor.
give nifidipine 30mg and labetolol 100mg early and send the patient to rehab

## 2018-05-11 NOTE — PROVIDER CONTACT NOTE (OTHER) - SITUATION
/76 HR 60
Patient complaining of nausea. patient vomited apple sauce he had with am meds and some sputum.
Patient continues to complain he does not feel well. Continues to be nauseous. Patient is currently not oriented to place and situation.
patient blood pressure is 162/68, heart rate is 62

## 2018-05-11 NOTE — PROGRESS NOTE ADULT - SUBJECTIVE AND OBJECTIVE BOX
S: Patient (+) flatus, (+) BM; denies fevers, chills, nausea, emesis, chest pain, SOB. ready for rehab    O: Vital Signs Last 24 Hrs  T(C): 36.9 (11 May 2018 05:59), Max: 36.9 (10 May 2018 20:39)  T(F): 98.4 (11 May 2018 05:59), Max: 98.5 (11 May 2018 01:26)  HR: 64 (11 May 2018 05:59) (62 - 71)  BP: 177/61 (11 May 2018 05:59) (132/44 - 178/67)  BP(mean): --  RR: 16 (11 May 2018 05:59) (16 - 20)  SpO2: 97% (11 May 2018 05:59) (95% - 100%)  I&O's Detail    10 May 2018 07:01  -  11 May 2018 07:00  --------------------------------------------------------  IN:  Total IN: 0 mL    OUT:    Voided: 600 mL  Total OUT: 600 mL    Total NET: -600 mL        General: alert and oriented, NAD  Resp: airway patent, respirations unlabored  CVS: regular rate and rhythm  Abdomen: soft, nontender, nondistended  Extremities:  below the knee incision with packing, LLE PT, DP, AT signals, distally warm  Skin: warm, dry, appropriate color                          8.0    5.88  )-----------( 344      ( 11 May 2018 05:45 )             25.6   05-11    136  |  101  |  51<H>  ----------------------------<  72  5.1   |  20<L>  |  3.00<H>    Ca    8.9      11 May 2018 05:45  Phos  4.2     05-11  Mg     2.2     05-11    TPro  7.3  /  Alb  3.0<L>  /  TBili  0.3  /  DBili  x   /  AST  21  /  ALT  23  /  AlkPhos  97  05-09

## 2018-05-11 NOTE — PROGRESS NOTE ADULT - ASSESSMENT
63M s/p L fem bk pop pt bypass 3/29, readmitted for 6cm breakdown in below knee incision. Patient now s/p evacuation hematoma, ligation of below the knee popliteal artery on 5/3/18, hemodynamically stable pending rehab placement.    - Surgical site wound culture from 05/03/18 grew Acinetobacter baumannii, Proteus mirabilis, Staph aureus; per ID continue unasyn 3 g q12 x 10 days  - Plan outpatient antibiotics via PICC  - Trend  BUN/Creatinine; recommends renally dosing all meds and avoiding nephrotoxic agents   - Continue regular diet  - Daily dressing changes to incision site: packing with small kerlix and betadine to toes with dressing changes  - Continue neurovascular checks  - PT, encourage OOB/IS  - DISPO: plan dc pending placement    [id number]

## 2018-05-11 NOTE — PROGRESS NOTE ADULT - SUBJECTIVE AND OBJECTIVE BOX
CHIEF COMPLAINT: Patient is a 63y old  male who presents with a chief complaint of RIGHT LEG SURGICAL SITE BLEEDING (10 May 2018 19:37)      SUBJECTIVE / OVERNIGHT EVENTS:    Sleeping soundly.    MEDICATIONS  (STANDING):  ampicillin/sulbactam  IVPB 3 Gram(s) IV Intermittent every 12 hours  aspirin enteric coated 81 milliGRAM(s) Oral daily  chlorhexidine 4% Liquid 1 Application(s) Topical two times a day  dextrose 5%. 1000 milliLiter(s) (50 mL/Hr) IV Continuous <Continuous>  dextrose 50% Injectable 12.5 Gram(s) IV Push once  dextrose 50% Injectable 25 Gram(s) IV Push once  dextrose 50% Injectable 25 Gram(s) IV Push once  docusate sodium 100 milliGRAM(s) Oral two times a day  furosemide    Tablet 40 milliGRAM(s) Oral two times a day  heparin  Injectable 5000 Unit(s) SubCutaneous every 8 hours  insulin lispro (HumaLOG) corrective regimen sliding scale   SubCutaneous three times a day before meals  insulin lispro (HumaLOG) corrective regimen sliding scale   SubCutaneous at bedtime  labetalol 100 milliGRAM(s) Oral two times a day  NIFEdipine XL 30 milliGRAM(s) Oral at bedtime  polyethylene glycol 3350 17 Gram(s) Oral daily  senna 2 Tablet(s) Oral at bedtime  tamsulosin 0.4 milliGRAM(s) Oral at bedtime    MEDICATIONS  (PRN):  acetaminophen   Tablet. 650 milliGRAM(s) Oral every 6 hours PRN Mild Pain (1 - 3)  dextrose Gel 1 Dose(s) Oral once PRN Blood Glucose LESS THAN 70 milliGRAM(s)/deciliter  glucagon  Injectable 1 milliGRAM(s) IntraMuscular once PRN Glucose LESS THAN 70 milligrams/deciliter  polyethylene glycol 3350 17 Gram(s) Oral daily PRN Constipation      VITALS:  T(F): 97.9 (05-11-18 @ 10:12), Max: 98.5 (05-11-18 @ 01:26)  HR: 67 (05-11-18 @ 10:12) (62 - 68)  BP: 157/62 (05-11-18 @ 10:12) (157/62 - 178/67)  RR: 18 (05-11-18 @ 10:12) (16 - 20)  SpO2: 96% (05-11-18 @ 10:12)      CAPILLARY BLOOD GLUCOSE    Output     I&O's Summary  T(F): 97.9 (05-11-18 @ 10:12), Max: 98.5 (05-11-18 @ 01:26)  HR: 67 (05-11-18 @ 10:12) (62 - 68)  BP: 157/62 (05-11-18 @ 10:12) (157/62 - 178/67)  RR: 18 (05-11-18 @ 10:12) (16 - 20)  SpO2: 96% (05-11-18 @ 10:12)    PHYSICAL EXAM:  GENERAL: NAD, well-developed  HEAD:  Atraumatic, Normocephalic  NECK: Supple, No JVD  CHEST/LUNG: nonlabored breathing  PSYCH: sleeping  SKIN: No rashes noted    LABS:              8.0                  136  | 20   | 51           5.88  >-----------< 344     ------------------------< 72                    25.6                 5.1  | 101  | 3.00                                         Ca 8.9   Mg 2.2   Ph 4.2         TPro  7.3  /  Alb  3.0      TBili  0.3  /  DBili  x         AST  21  /  ALT  23            AlkPhos  97        CARDIAC MARKERS  0.11 ng/mL / 52 u/L / 1.51 ng/mL  CARDIAC MARKERS  0.13 ng/mL / 53 u/L / 1.48 ng/mL              MICROBIOLOGY:        RADIOLOGY & ADDITIONAL TESTS:    Imaging Personally Reviewed:        [x] Consultant(s) Notes Reviewed: vascular surgery    [] Care Discussed with Consultants/Other Providers:

## 2018-05-11 NOTE — PROGRESS NOTE ADULT - PROBLEM SELECTOR PROBLEM 1
Wound dehiscence

## 2018-05-11 NOTE — PROGRESS NOTE ADULT - PROVIDER SPECIALTY LIST ADULT
Anesthesia
Cardiology
Hospitalist
Infectious Disease
Hospitalist
Infectious Disease
Vascular Surgery
Vascular Surgery
Cardiology
Hospitalist
Hospitalist
Infectious Disease
Vascular Surgery
Infectious Disease
Vascular Surgery

## 2018-05-11 NOTE — PROGRESS NOTE ADULT - PROBLEM SELECTOR PROBLEM 2
Wound infection after surgery, sequela

## 2018-05-11 NOTE — PROGRESS NOTE ADULT - ASSESSMENT
62 y/o man with HTN, DM2, CKD stage 4, PAD s/p R BKA, recent L femoral to below knee bypass with RSVG 3/2018, course c/b acute on chronic diastoliC CHF, sent in from rehab with LLE wound dehiscence. Medicine consulted for co-management.    *LLE wound dehiscence, hematoma: s/p exploration, ligation of below the knee popliteal artery on 5/3  - appreciate ID recs  - abx per ID plan  - pain control, bowel regimen  - management per primary vascular service  - PT    *Vision loss: reports history of vision loss, had injections prior - likely diabetic retinopathy given h/o diabetes  - recommend ophthalmology consult    *CV: HTN, h/o diastolic CHF (chronic), PAD  - BP elevated overnight again  - changed nifedipine XL to 120 mg at bedtime  - given additional 30 mg tonight  - monitor BP  - cont Lasix 40 mg BID  - cont labetalol 100 mg BID  - cont aspirin, statin    *DM2: A1c 5.5 -   - continue correction scale insulin  - monitor fingersticks - currently at goal    *CKD stage 4: Cr worsening  - renally dose all medications  - avoid nephrotoxic agents  - monitor Cr    Thanks for the consult. Hospitalist will follow with you.

## 2018-05-11 NOTE — PROGRESS NOTE ADULT - ASSESSMENT
62 yo M with hx DM, CKD, HTN, R BKA, recent admission for left fem to below-knee pop bypass with RSVG (March 2018), now sent in from rehab with dehiscence of his lower extremity wound spanning approximately 4-5cm.     CT with 13 x 5 cm hematoma at site of surgery  s/p LLE hematoma exploration and ligation of below knee popliteal with distal to distal anastomosis of fem pop bypass  Afebrile.  Wound cx with Proteus, S. aureus (MSSA), and Acinetobacter baumanii  Graft is RSVG - no foreign material per vascular  Discussed with Vascular team at bedside - wound is clean and progressing    Recommend:  Continue UNASYN 3 grams every 12 hours 5/8 -->     Continue Unasyn through 5/18    Please call ID if needed this weekend

## 2018-05-18 ENCOUNTER — EMERGENCY (EMERGENCY)
Facility: HOSPITAL | Age: 63
LOS: 1 days | Discharge: NURSING, REHAB OR LONG-TERM CARE | End: 2018-05-18
Attending: EMERGENCY MEDICINE | Admitting: EMERGENCY MEDICINE
Payer: MEDICAID

## 2018-05-18 VITALS
TEMPERATURE: 98 F | SYSTOLIC BLOOD PRESSURE: 156 MMHG | DIASTOLIC BLOOD PRESSURE: 65 MMHG | HEART RATE: 69 BPM | RESPIRATION RATE: 15 BRPM | OXYGEN SATURATION: 100 %

## 2018-05-18 DIAGNOSIS — Z98.890 OTHER SPECIFIED POSTPROCEDURAL STATES: Chronic | ICD-10-CM

## 2018-05-18 DIAGNOSIS — Z89.511 ACQUIRED ABSENCE OF RIGHT LEG BELOW KNEE: Chronic | ICD-10-CM

## 2018-05-18 LAB
ALBUMIN SERPL ELPH-MCNC: 3 G/DL — LOW (ref 3.3–5)
ALP SERPL-CCNC: 96 U/L — SIGNIFICANT CHANGE UP (ref 40–120)
ALT FLD-CCNC: 40 U/L — SIGNIFICANT CHANGE UP (ref 4–41)
APTT BLD: 32.4 SEC — SIGNIFICANT CHANGE UP (ref 27.5–37.4)
AST SERPL-CCNC: 39 U/L — SIGNIFICANT CHANGE UP (ref 4–40)
BASOPHILS # BLD AUTO: 0.08 K/UL — SIGNIFICANT CHANGE UP (ref 0–0.2)
BASOPHILS NFR BLD AUTO: 0.9 % — SIGNIFICANT CHANGE UP (ref 0–2)
BILIRUB SERPL-MCNC: < 0.2 MG/DL — LOW (ref 0.2–1.2)
BUN SERPL-MCNC: 47 MG/DL — HIGH (ref 7–23)
CALCIUM SERPL-MCNC: 8.6 MG/DL — SIGNIFICANT CHANGE UP (ref 8.4–10.5)
CHLORIDE SERPL-SCNC: 103 MMOL/L — SIGNIFICANT CHANGE UP (ref 98–107)
CO2 SERPL-SCNC: 20 MMOL/L — LOW (ref 22–31)
CREAT SERPL-MCNC: 2.9 MG/DL — HIGH (ref 0.5–1.3)
CRP SERPL-MCNC: 7.4 MG/L — HIGH
EOSINOPHIL # BLD AUTO: 0.89 K/UL — HIGH (ref 0–0.5)
EOSINOPHIL NFR BLD AUTO: 9.9 % — HIGH (ref 0–6)
ERYTHROCYTE [SEDIMENTATION RATE] IN BLOOD: 40 MM/HR — HIGH (ref 1–15)
GLUCOSE SERPL-MCNC: 84 MG/DL — SIGNIFICANT CHANGE UP (ref 70–99)
HBA1C BLD-MCNC: 5.4 % — SIGNIFICANT CHANGE UP (ref 4–5.6)
HCT VFR BLD CALC: 24.6 % — LOW (ref 39–50)
HGB BLD-MCNC: 7.7 G/DL — LOW (ref 13–17)
IMM GRANULOCYTES # BLD AUTO: 0.04 # — SIGNIFICANT CHANGE UP
IMM GRANULOCYTES NFR BLD AUTO: 0.4 % — SIGNIFICANT CHANGE UP (ref 0–1.5)
INR BLD: 0.98 — SIGNIFICANT CHANGE UP (ref 0.88–1.17)
LYMPHOCYTES # BLD AUTO: 2.1 K/UL — SIGNIFICANT CHANGE UP (ref 1–3.3)
LYMPHOCYTES # BLD AUTO: 23.3 % — SIGNIFICANT CHANGE UP (ref 13–44)
MCHC RBC-ENTMCNC: 27 PG — SIGNIFICANT CHANGE UP (ref 27–34)
MCHC RBC-ENTMCNC: 31.3 % — LOW (ref 32–36)
MCV RBC AUTO: 86.3 FL — SIGNIFICANT CHANGE UP (ref 80–100)
MONOCYTES # BLD AUTO: 0.63 K/UL — SIGNIFICANT CHANGE UP (ref 0–0.9)
MONOCYTES NFR BLD AUTO: 7 % — SIGNIFICANT CHANGE UP (ref 2–14)
NEUTROPHILS # BLD AUTO: 5.26 K/UL — SIGNIFICANT CHANGE UP (ref 1.8–7.4)
NEUTROPHILS NFR BLD AUTO: 58.5 % — SIGNIFICANT CHANGE UP (ref 43–77)
NRBC # FLD: 0 — SIGNIFICANT CHANGE UP
PLATELET # BLD AUTO: 306 K/UL — SIGNIFICANT CHANGE UP (ref 150–400)
PMV BLD: 10.7 FL — SIGNIFICANT CHANGE UP (ref 7–13)
POTASSIUM SERPL-MCNC: 5.1 MMOL/L — SIGNIFICANT CHANGE UP (ref 3.5–5.3)
POTASSIUM SERPL-SCNC: 5.1 MMOL/L — SIGNIFICANT CHANGE UP (ref 3.5–5.3)
PROT SERPL-MCNC: 7.5 G/DL — SIGNIFICANT CHANGE UP (ref 6–8.3)
PROTHROM AB SERPL-ACNC: 11.3 SEC — SIGNIFICANT CHANGE UP (ref 9.8–13.1)
RBC # BLD: 2.85 M/UL — LOW (ref 4.2–5.8)
RBC # FLD: 16 % — HIGH (ref 10.3–14.5)
SODIUM SERPL-SCNC: 138 MMOL/L — SIGNIFICANT CHANGE UP (ref 135–145)
WBC # BLD: 9 K/UL — SIGNIFICANT CHANGE UP (ref 3.8–10.5)
WBC # FLD AUTO: 9 K/UL — SIGNIFICANT CHANGE UP (ref 3.8–10.5)

## 2018-05-18 PROCEDURE — 99220: CPT

## 2018-05-18 PROCEDURE — 99285 EMERGENCY DEPT VISIT HI MDM: CPT | Mod: GC

## 2018-05-18 PROCEDURE — 70450 CT HEAD/BRAIN W/O DYE: CPT | Mod: 26

## 2018-05-18 RX ORDER — DEXTROSE 50 % IN WATER 50 %
15 SYRINGE (ML) INTRAVENOUS ONCE
Refills: 0 | Status: DISCONTINUED | OUTPATIENT
Start: 2018-05-18 | End: 2018-05-22

## 2018-05-18 RX ORDER — INSULIN LISPRO 100/ML
VIAL (ML) SUBCUTANEOUS
Refills: 0 | Status: DISCONTINUED | OUTPATIENT
Start: 2018-05-18 | End: 2018-05-22

## 2018-05-18 RX ORDER — DEXTROSE 50 % IN WATER 50 %
25 SYRINGE (ML) INTRAVENOUS ONCE
Refills: 0 | Status: DISCONTINUED | OUTPATIENT
Start: 2018-05-18 | End: 2018-05-22

## 2018-05-18 RX ORDER — INSULIN LISPRO 100/ML
2 VIAL (ML) SUBCUTANEOUS
Refills: 0 | Status: DISCONTINUED | OUTPATIENT
Start: 2018-05-18 | End: 2018-05-18

## 2018-05-18 RX ORDER — DEXTROSE 50 % IN WATER 50 %
12.5 SYRINGE (ML) INTRAVENOUS ONCE
Refills: 0 | Status: DISCONTINUED | OUTPATIENT
Start: 2018-05-18 | End: 2018-05-22

## 2018-05-18 RX ORDER — GLUCAGON INJECTION, SOLUTION 0.5 MG/.1ML
1 INJECTION, SOLUTION SUBCUTANEOUS ONCE
Refills: 0 | Status: DISCONTINUED | OUTPATIENT
Start: 2018-05-18 | End: 2018-05-22

## 2018-05-18 RX ORDER — SODIUM CHLORIDE 9 MG/ML
1000 INJECTION, SOLUTION INTRAVENOUS
Refills: 0 | Status: DISCONTINUED | OUTPATIENT
Start: 2018-05-18 | End: 2018-05-22

## 2018-05-18 RX ORDER — INSULIN LISPRO 100/ML
VIAL (ML) SUBCUTANEOUS AT BEDTIME
Refills: 0 | Status: DISCONTINUED | OUTPATIENT
Start: 2018-05-18 | End: 2018-05-22

## 2018-05-18 RX ADMIN — Medication 108 MILLIGRAM(S): at 12:18

## 2018-05-18 RX ADMIN — Medication 2 UNIT(S): at 14:00

## 2018-05-18 RX ADMIN — Medication 2 UNIT(S): at 18:34

## 2018-05-18 NOTE — ED PROVIDER NOTE - PHYSICAL EXAMINATION
Eyes: b/l blurry vision, EOMI, pupils 2mm b/l, non-reactive to light; patient endorsing diplopia regardless of visual quadrant he is looking in; denies pain with eye movement; no nystagmus noted; peripheral vision b/l

## 2018-05-18 NOTE — CONSULT NOTE ADULT - SUBJECTIVE AND OBJECTIVE BOX
Neurology consult    JAMES PATRICK63yMale     Patient is a 63y old  Male who presents with a chief complaint of     HPI:  62yo RHm on Aspirin 80 with PMH DM, HTN, CKD PAD, CVA (could not say deficit) recent fem-pop, right BKA presents to the ED for worsening vision for the past 2-3 days. Pt is poor historian. Endorses chronic poor vision which worsened 3  ago, Can not seen coolors or movement in periphery Currently denies diplopia, denies trauma, flashes, eye pain, jaw claudication, fevers, scalp tenderness, or fevers.s. no difficulty with language.  No dizziness, vertigo or new hearing loss.  . No difficulty with swallowing.  No focal weakness.  No focal sensory changes.    REVIEW OF SYSTEMS:    Constitutional: No fever, chills, fatigue, weakness  Eyes: no eye pain, visual disturbances, or discharge  ENT:  No difficulty hearing, tinnitus, vertigo; No sinus or throat pain  Neck: No pain or stiffness  Respiratory: No cough, dyspnea, wheezing   Cardiovascular: No chest pain, palpitations,   Gastrointestinal: No abdominal or epigastric pain. No nausea, vomiting  No diarrhea or constipation.   Genitourinary: No dysuria, frequency, hematuria or incontinence  Neurological: see HPI  Psychiatric: No depression, anxiety, mood swings or difficulty sleeping  Musculoskeletal: No joint pain or swelling; No muscle, back or extremity pain  Skin: No itching, burning, rashes or lesions   Lymph Nodes: No enlarged glands  Endocrine: No heat or cold intolerance; No hair loss, No h/o diabetes or thyroid dysfunction  Allergy and Immunologic: No hives or eczema    MEDICATIONS    dextrose 40% Gel 15 Gram(s) Oral once PRN  dextrose 5%. 1000 milliLiter(s) IV Continuous <Continuous>  dextrose 50% Injectable 12.5 Gram(s) IV Push once  dextrose 50% Injectable 25 Gram(s) IV Push once  dextrose 50% Injectable 25 Gram(s) IV Push once  glucagon  Injectable 1 milliGRAM(s) IntraMuscular once PRN  insulin lispro Injectable (HumaLOG) 2 Unit(s) SubCutaneous three times a day before meals      PMH: Kidney disease  HTN (hypertension)  DM (diabetes mellitus)       PSH: H/O peripheral artery bypass  S/P BKA (below knee amputation) unilateral, right  No significant past surgical history        FAMILY HISTORY:  Family history of diabetes mellitus (Mother)      SOCIAL HISTORY:  No history of tobacco or alcohol use     Allergies    No Known Allergies    Intolerances            Vital Signs Last 24 Hrs  T(C): 36.7 (18 May 2018 14:27), Max: 36.9 (18 May 2018 00:50)  T(F): 98.1 (18 May 2018 14:27), Max: 98.5 (18 May 2018 00:50)  HR: 85 (18 May 2018 14:27) (69 - 85)  BP: 185/71 (18 May 2018 14:27) (154/69 - 185/71)  BP(mean): --  RR: 16 (18 May 2018 14:27) (15 - 16)  SpO2: 100% (18 May 2018 14:27) (99% - 100%)      On Neurological Examination:    Mental Status - Patient is alert, awake, oriented X3. fluent mild slowed responses, no dysarthria    Cranial Nerves - global visual deficits most severe in periphery, EOMI, , V1-V3 intact, no gross facial asymmetry, tongue/uvula midline    Motor Exam -   Right upper 5-/5  Left uppper 5-/5  Right lower 5/5 BKA  Left lower 5-/5     nml bulk/tone    Sensory    Intact to light touch and pinprick bilaterally    Coord: RUE ataxia, loss of check, LUE subtle dysmetria    Gait -  deferred    Reflexes:         mute to 1+                         LABS:  CBC Full  -  ( 18 May 2018 04:00 )  WBC Count : 9.00 K/uL  Hemoglobin : 7.7 g/dL  Hematocrit : 24.6 %  Platelet Count - Automated : 306 K/uL  Mean Cell Volume : 86.3 fL  Mean Cell Hemoglobin : 27.0 pg  Mean Cell Hemoglobin Concentration : 31.3 %  Auto Neutrophil # : 5.26 K/uL  Auto Lymphocyte # : 2.10 K/uL  Auto Monocyte # : 0.63 K/uL  Auto Eosinophil # : 0.89 K/uL  Auto Basophil # : 0.08 K/uL  Auto Neutrophil % : 58.5 %  Auto Lymphocyte % : 23.3 %  Auto Monocyte % : 7.0 %  Auto Eosinophil % : 9.9 %  Auto Basophil % : 0.9 %      05-18    138  |  103  |  47<H>  ----------------------------<  84  5.1   |  20<L>  |  2.90<H>    Ca    8.6      18 May 2018 04:00    TPro  7.5  /  Alb  3.0<L>  /  TBili  < 0.2<L>  /  DBili  x   /  AST  39  /  ALT  40  /  AlkPhos  96  05-18    LIVER FUNCTIONS - ( 18 May 2018 04:00 )  Alb: 3.0 g/dL / Pro: 7.5 g/dL / ALK PHOS: 96 u/L / ALT: 40 u/L / AST: 39 u/L / GGT: x           Hemoglobin A1C:       PT/INR - ( 18 May 2018 04:00 )   PT: 11.3 SEC;   INR: 0.98          PTT - ( 18 May 2018 04:00 )  PTT:32.4 SEC      RADIOLOGY  CTH < from: CT Head No Cont (05.18.18 @ 04:54) >  nterval left lens replacement. The optic globes are otherwise smooth and   symmetric and there is no retrobulbar fatty stranding.    Encephalomalacia and gliosis in the occipital lobes bilaterally, likely   secondary to old posterior cerebral artery infarcts, unchanged.    Thalamocapsular and bilateral cerebellar lacunar infarcts, unchanged.    Moderate periventricular and deep white matter ill-defined   hypoattenuation likely chronic microvessel ischemic change.    Moderate cerebral atrophy with proportionate enlargement of the   ventricles and sulci.    Ex vacuo dilation of the occipital horns of thelateral ventricles.    There is no acute intracranial mass-effect, hemorrhage, midline shift, or   abnormal extra-axial fluid collection. The basal cisterns are patent.     Paranasal sinuses and mastoid air cells are clear. Calvarium is intact.     IMPRESSION:   No acute intracranial bleeding, mass effect, or shift. Interval left lens   replacement. If symptoms persist, MRI can be for further evaluation, if   clinically warranted.    < end of copied text > Neurology consult    JAMES PATRICK63yMale     Patient is a 63y old  Male who presents with a chief complaint of acute change in vision x 3 days     HPI:  62yo RHm on Aspirin 81 with PMH DM, HTN, CKD PAD, CVA (could not say deficit) recent fem-pop, right BKA presents to the ED for worsening vision for the past 2-3 days. Pt is poor historian. Endorses chronic poor vision which worsened 3  ago, Can not seen coolors or movement in periphery Currently denies diplopia, denies trauma, flashes, eye pain, jaw claudication, fevers, scalp tenderness, or fevers.s. no difficulty with language.  No dizziness, vertigo or new hearing loss.  . No difficulty with swallowing.  No focal weakness.  No focal sensory changes. NIHSS 5 preMRS 3     REVIEW OF SYSTEMS:  Constitutional: No fever, chills, fatigue, weakness  Eyes: no eye pain, visual disturbances, or discharge  ENT:  No difficulty hearing, tinnitus, vertigo; No sinus or throat pain  Neck: No pain or stiffness  Respiratory: No cough, dyspnea, wheezing   Cardiovascular: No chest pain, palpitations,   Gastrointestinal: No abdominal or epigastric pain. No nausea, vomiting  No diarrhea or constipation.   Genitourinary: No dysuria, frequency, hematuria or incontinence  Neurological: see HPI  Psychiatric: No depression, anxiety, mood swings or difficulty sleeping  Musculoskeletal: No joint pain or swelling; No muscle, back or extremity pain  Skin: No itching, burning, rashes or lesions   Lymph Nodes: No enlarged glands  Endocrine: No heat or cold intolerance; No hair loss, No h/o diabetes or thyroid dysfunction  Allergy and Immunologic: No hives or eczema    MEDICATIONS    dextrose 40% Gel 15 Gram(s) Oral once PRN  dextrose 5%. 1000 milliLiter(s) IV Continuous <Continuous>  dextrose 50% Injectable 12.5 Gram(s) IV Push once  dextrose 50% Injectable 25 Gram(s) IV Push once  dextrose 50% Injectable 25 Gram(s) IV Push once  glucagon  Injectable 1 milliGRAM(s) IntraMuscular once PRN  insulin lispro Injectable (HumaLOG) 2 Unit(s) SubCutaneous three times a day before meals    PMH:   Kidney disease  HTN (hypertension)  DM (diabetes mellitus)     PSH:   H/O peripheral artery bypass  S/P BKA (below knee amputation) unilateral, right    FAMILY HISTORY:  Family history of diabetes mellitus (Mother)    SOCIAL HISTORY:  No history of tobacco or alcohol use     Allergies  No Known Allergies    Vital Signs Last 24 Hrs  T(C): 36.7 (18 May 2018 14:27), Max: 36.9 (18 May 2018 00:50)  T(F): 98.1 (18 May 2018 14:27), Max: 98.5 (18 May 2018 00:50)  HR: 85 (18 May 2018 14:27) (69 - 85)  BP: 185/71 (18 May 2018 14:27) (154/69 - 185/71)  RR: 16 (18 May 2018 14:27) (15 - 16)  SpO2: 100% (18 May 2018 14:27) (99% - 100%)    On Neurological Examination:    Mental Status - Patient is alert, awake, oriented X3. fluent mild slowed responses, no dysarthria    Cranial Nerves - global visual deficits most severe in periphery, EOMI, , V1-V3 intact, no gross facial asymmetry, tongue/uvula midline    Motor Exam -   Right upper 5-/5  Left uppper 5-/5  Right lower 5/5 BKA  Left lower 5-/5     nml bulk/tone    Sensory    Intact to light touch and pinprick bilaterally    Coord: RUE ataxia, loss of check, LUE subtle dysmetria    Gait -  deferred    Reflexes:         mute to 1+        LABS:  CBC Full  -  ( 18 May 2018 04:00 )  WBC Count : 9.00 K/uL  Hemoglobin : 7.7 g/dL  Hematocrit : 24.6 %  Platelet Count - Automated : 306 K/uL  Mean Cell Volume : 86.3 fL  Mean Cell Hemoglobin : 27.0 pg  Mean Cell Hemoglobin Concentration : 31.3 %  Auto Neutrophil # : 5.26 K/uL  Auto Lymphocyte # : 2.10 K/uL  Auto Monocyte # : 0.63 K/uL  Auto Eosinophil # : 0.89 K/uL  Auto Basophil # : 0.08 K/uL  Auto Neutrophil % : 58.5 %  Auto Lymphocyte % : 23.3 %  Auto Monocyte % : 7.0 %  Auto Eosinophil % : 9.9 %  Auto Basophil % : 0.9 %    05-18    138  |  103  |  47<H>  ----------------------------<  84  5.1   |  20<L>  |  2.90<H>    Ca    8.6      18 May 2018 04:00    TPro  7.5  /  Alb  3.0<L>  /  TBili  < 0.2<L>  /  DBili  x   /  AST  39  /  ALT  40  /  AlkPhos  96  05-18    LIVER FUNCTIONS - ( 18 May 2018 04:00 )  Alb: 3.0 g/dL / Pro: 7.5 g/dL / ALK PHOS: 96 u/L / ALT: 40 u/L / AST: 39 u/L / GGT: x           Hemoglobin A1C:     PT/INR - ( 18 May 2018 04:00 )   PT: 11.3 SEC;   INR: 0.98        PTT - ( 18 May 2018 04:00 )  PTT:32.4 SEC    RADIOLOGY  CTH < from: CT Head No Cont (05.18.18 @ 04:54) >  nterval left lens replacement. The optic globes are otherwise smooth and   symmetric and there is no retrobulbar fatty stranding.    Encephalomalacia and gliosis in the occipital lobes bilaterally, likely   secondary to old posterior cerebral artery infarcts, unchanged.    Thalamocapsular and bilateral cerebellar lacunar infarcts, unchanged.    Moderate periventricular and deep white matter ill-defined   hypoattenuation likely chronic microvessel ischemic change.    Moderate cerebral atrophy with proportionate enlargement of the   ventricles and sulci.    Ex vacuo dilation of the occipital horns of thelateral ventricles.    There is no acute intracranial mass-effect, hemorrhage, midline shift, or   abnormal extra-axial fluid collection. The basal cisterns are patent.     Paranasal sinuses and mastoid air cells are clear. Calvarium is intact.     IMPRESSION:   No acute intracranial bleeding, mass effect, or shift. Interval left lens   replacement. If symptoms persist, MRI can be for further evaluation, if   clinically warranted.    < end of copied text >

## 2018-05-18 NOTE — ED CDU PROVIDER INITIAL DAY NOTE - OBJECTIVE STATEMENT
64 yo male with PMH of right BKA, left foot MRSA infection of left foot ulcer, DM, HTN, CKD, presents to the ED for diplopia and b/l eye blurry vision for the past 2-3 days. Patient presenting from [place] where he is being treated for his foot infection. Patient has prescription for glasses but states they broke a long time ago. States these present symptoms are new and did not begin right after stopping using glasses. Denies trauma, n/v, fever, chills, paresthesias, gait abnormalities, focal weakness. 64 yo male with PMH of right BKA, left foot MRSA infection of left foot ulcer, DM, HTN, CKD, presents to the ED for diplopia and b/l eye blurry vision for the past 2-3 days. Patient presenting from [place] where he is being treated for his foot infection. Patient has prescription for glasses but states they broke a long time ago. States these present symptoms are new and did not begin right after stopping using glasses. Denies trauma, n/v, fever, chills, paresthesias, gait abnormalities, focal weakness.  CDU BECKI Ndiaye note: I agree with above. 64 yo male with PMH of right BKA, left foot MRSA infection of left foot ulcer, DM, HTN, CKD, presents to the ED for diplopia and b/l eye blurry vision for the past 2-3 days. pt sent to CDU initially to give steroids and check finger stick r/o temporal artritis, rheum follow up

## 2018-05-18 NOTE — ED ADULT NURSE REASSESSMENT NOTE - NS ED NURSE REASSESS COMMENT FT1
received report from RN Kamila, pt A&Ox3, pt stable no respiratory distress, respirations equal and non labored, pt will be admitted, received report from MARCO Treviño, pt A&Ox3, pt stable no respiratory distress, respirations equal and non labored, pt only able to see the "E" on the eye chart, pt reports he is able to see only at distance, denies headache left foot dressing dry and intact, pt will be admitted,

## 2018-05-18 NOTE — CONSULT NOTE ADULT - PROBLEM SELECTOR RECOMMENDATION 9
possibly hemianopsia b/l (r/o new infarct) in the setting of old b/l PCA infarcts.   Plan:   -MRI brain w/o contrast  -MRA H/N w/o contrast (avoid contrast due to worsening Cr)   -secondary stroke prevention core measures w/ risk factor control  -start ASA if not already on it as a home medication   -Labs: A1c, Lipid Panel   -tele  -PT/OT  -NPO until dysphagia screen passed

## 2018-05-18 NOTE — CONSULT NOTE ADULT - ASSESSMENT
64yo RHm on Aspirin 81 with PMH DM, HTN, CKD PAD, CVA (could not say deficit) recent fem-pop, right BKA presents to the ED for worsening vision for the past 2-3 days.PE as above VF deficits in all fields bilaterally ataxia R>L reviewed CTH chronic bilaterally PCa, cerebellar CVA NHSS 5,pre-MRS 3 64yo RHm on Aspirin 81 with PMH DM, HTN, CKD PAD, CVA (could not say deficit) recent fem-pop, right BKA presents to the ED for worsening vision for the past 2-3 days.PE as above VF deficits in all fields bilaterally ataxia R>L reviewed CTH chronic bilaterally PCa, cerebellar CVA NHSS 5,pre-MRS 3 Suspect vision deficits in setting of uncontrolled DM with B/L PCa strokes. Given evidence of chronic posterior circulation strokes, with visual deificts and ataxia on exam would R/O acute infarct with 62yo RHm on Aspirin 81 with PMH DM, HTN, CKD PAD, CVA (could not say deficit) recent fem-pop, right BKA presents to the ED for worsening vision for the past 2-3 days.PE as above VF deficits in all fields bilaterally ataxia R>L reviewed CTH chronic bilaterally PCa, cerebellar CVA NHSS 5,pre-MRS 3 Suspect vision deficits in setting of uncontrolled DM with B/L PCa strokes. Given evidence of chronic posterior circulation strokes, with visual deificts and ataxia on exam would R/O new infarct.

## 2018-05-18 NOTE — ED PROVIDER NOTE - MEDICAL DECISION MAKING DETAILS
CT head, labs, optho consult; eval for CVA, ICH, retinal detachment Pt with blurred vision, mild, intermittent. NO vision loss, no pain, no focal deficits.  Unclear etiology.  CTH, f/u c Ophtho. Labs, reassess.

## 2018-05-18 NOTE — ED PROVIDER NOTE - OBJECTIVE STATEMENT
64 yo male with PMH of right BKA, left foot MRSA infection of left foot ulcer, DM, HTN, CKD, presents to the ED for diplopia and b/l eye blurry vision for the past 2-3 days. Patient presenting from [place] where he is being treated for his foot infection. Patient has prescription for glasses but states they broke a long time ago. States these present symptoms are new and did not begin right after stopping using glasses. Denies trauma, n/v, fever, chills, paresthesias, gait abnormalities, focal weakness. 64 yo male with PMH of right BKA, left foot MRSA infection of left foot ulcer, DM, HTN, CKD, presents to the ED for diplopia and b/l eye blurry vision for the past 2-3 days. Patient presenting from [place] where he is being treated for his foot infection. Patient has prescription for glasses but states they broke a long time ago. States these present symptoms are new and did not begin right after stopping using glasses. Denies trauma, n/v, fever, chills, paresthesias, gait abnormalities, focal weakness. Feels well otherwise. No HA and no vision loss.

## 2018-05-18 NOTE — CONSULT NOTE ADULT - ATTENDING COMMENTS
patient seen and examined by me personally with resident   chart events noted   agree with above assessment and plan  no indication that there is GCA at this time
patient seen and examined agree with above.  Need to have imaging to see if new event

## 2018-05-18 NOTE — CONSULT NOTE ADULT - ASSESSMENT
63M with PMH of PAD s/p recent fem pop bypass complicated by infection, DM2, HTN, CKD stage 4 is seen for acute onset of worsening blurry vision in his R eye for 3 days duration. Given he has no other clinical symptoms of GCA (no fever, headache, jaw claudication), there is very low suspicion of Giant Cell Arteritis. His ESR was elevated few months back and is approximately the same today. His elevated ESR can be due to his recent infection in his leg.     Recommendations:  -There is no need for steroids  -Consider work up for CVA given his extensive vasculopath history, and history of multiple CVAs  -Rheumatology will sign off, please re consult us if any Rheumatologic issues arise    Jazz Cole PGY 3 Internal Medicine 63M with PMH of PAD s/p recent fem pop bypass complicated by infection, DM2, HTN, CKD stage 4 is seen for acute onset of worsening blurry vision in his R eye for 3 days duration. Given he has no other clinical symptoms of GCA (no fever, headache, jaw claudication), there is very low suspicion of Giant Cell Arteritis. His ESR was elevated few months back and is approximately the same today. His elevated ESR can be due to his recent infection in his leg.     Recommendations:  -There is no need for steroids  -Consider work up for CVA that could be contributing to his acute visual symptoms given his extensive vasculopath history, and history of multiple CVAs  -Rheumatology will sign off, please re consult us if any Rheumatologic issues arise    Jazz Cole PGY 3 Internal Medicine

## 2018-05-18 NOTE — CONSULT NOTE ADULT - SUBJECTIVE AND OBJECTIVE BOX
JAMES PATRICK  [id number]    HISTORY OF PRESENT ILLNESS: 63M with PMH of PAD s/p recent fem pop bypass, DM2, HTN, CKD stage 4 is seen in consultation for blurry vision for 3 days duration. Patient reports that he normally has blurry vision in both eyes for many years. At baseline he reports he cannot even read a newpaper due to his vision impairments. He reports that suddenly 3 days ago he noted an acute worsening of blurry vision in his right eye. It is to the point that he cannot see colors and everything appears dark to him. He notes pain in his left leg where he had 2 recent surgeries. He denies any headaches, jaw claudication, jaw fatigue with eating, recent upper respiratory symptoms, shoulder/hip girdle pain, muscle aches or weakness, joint pains or swelling, AM stiffness, rash, chest pain, SOB, back pain.     PAST MEDICAL & SURGICAL HISTORY:  Kidney disease  HTN (hypertension)  DM (diabetes mellitus)  H/O peripheral artery bypass: left fem to below-knee pop with RSVG  S/P BKA (below knee amputation) unilateral, right      Review of Systems:  Gen:  No fevers/chills, weight loss  HEENT: No blurry vision, no difficulty swallowing  CVS: No chest pain/palpitations  Resp: No SOB/wheezing  GI: No N/V/C/D/abdominal pain  MSK:  Skin: No new rashes  Neuro: No headaches    MEDICATIONS  (STANDING):    MEDICATIONS  (PRN):      Allergies    No Known Allergies    Intolerances        PERTINENT MEDICATION HISTORY:    SOCIAL HISTORY:  OCCUPATION:  TRAVEL HISTORY:    FAMILY HISTORY:  Family history of diabetes mellitus (Mother)      Vital Signs Last 24 Hrs  T(C): 36.1 (18 May 2018 08:55), Max: 36.9 (18 May 2018 00:50)  T(F): 97 (18 May 2018 08:55), Max: 98.5 (18 May 2018 00:50)  HR: 72 (18 May 2018 08:55) (69 - 72)  BP: 168/58 (18 May 2018 08:55) (154/69 - 168/58)  BP(mean): --  RR: 16 (18 May 2018 08:55) (15 - 16)  SpO2: 100% (18 May 2018 08:55) (99% - 100%)    Daily     Daily     Physical Exam:  General: No apparent distress  HEENT: EOMI, MMM  CVS: +S1/S2, RRR  Resp: CTA b/l  GI: Soft, NT/ND  MSK:  Shoulders: wnl  Elbows: wnl  Wrists: wnl  MCPs: wnl  PIPs: wnl  DIPs: wnl   Hips: wnl  Knees: wnl   Ankle: wnl  Neuro: AAOx3  muscle strength RUE LUE ; RLE LLE   Skin: no  rashes    LABS:                        7.7    9.00  )-----------( 306      ( 18 May 2018 04:00 )             24.6     05-18    138  |  103  |  47<H>  ----------------------------<  84  5.1   |  20<L>  |  2.90<H>    Ca    8.6      18 May 2018 04:00    TPro  7.5  /  Alb  3.0<L>  /  TBili  < 0.2<L>  /  DBili  x   /  AST  39  /  ALT  40  /  AlkPhos  96  05-18    PT/INR - ( 18 May 2018 04:00 )   PT: 11.3 SEC;   INR: 0.98          PTT - ( 18 May 2018 04:00 )  PTT:32.4 SEC      RADIOLOGY & ADDITIONAL STUDIES: JAMES PATRICK  [id number]    HISTORY OF PRESENT ILLNESS: 63M with PMH of PAD s/p recent fem pop bypass, DM2, HTN, CKD stage 4 is seen in consultation for blurry vision for 3 days duration.  Translation services used to translate from [place]. Patient reports that he normally has blurry vision in both eyes for many years. At baseline he reports he cannot even read a newspaper due to his vision impairments. He reports that suddenly 3 days ago he noted an acute worsening of blurry vision in his right eye. It is to the point that he cannot see colors and everything appears dark to him. He notes pain in his left leg where he had 2 recent surgeries. He denies any headaches, jaw claudication, jaw fatigue with eating, recent upper respiratory symptoms, shoulder/hip girdle pain, muscle aches or weakness, joint pains or swelling, AM stiffness, rash, chest pain, SOB, back pain. He reports he can walk with 1 person assist when he wears his prosthesis.     PAST MEDICAL & SURGICAL HISTORY:  Kidney disease  HTN (hypertension)  DM (diabetes mellitus)  H/O peripheral artery bypass: left fem to below-knee pop with RSVG  S/P BKA (below knee amputation) unilateral, right      Review of Systems:  Gen:  Negative except HPI. No fevers/chills, weight loss  HEENT: Negative except HPI.  CVS: Negative except HPI. No chest pain/palpitations  Resp: Negative except HPI. No SOB/wheezing  GI: Negative except HPI. No N/V/C/D/abdominal pain  MSK: Negative except HPI.  Skin: No new rashes  Neuro: No headaches    MEDICATIONS  (STANDING):    MEDICATIONS  (PRN):      Allergies    No Known Allergies    Intolerances        PERTINENT MEDICATION HISTORY:    SOCIAL HISTORY: [place]. No drugs smoking or alcohol  OCCUPATION: Reired. Used to work as a   TRAVEL HISTORY: no recent travel.    FAMILY HISTORY:  Family history of diabetes mellitus (Mother)  No history of autoimmune diseases      Vital Signs Last 24 Hrs  T(C): 36.1 (18 May 2018 08:55), Max: 36.9 (18 May 2018 00:50)  T(F): 97 (18 May 2018 08:55), Max: 98.5 (18 May 2018 00:50)  HR: 72 (18 May 2018 08:55) (69 - 72)  BP: 168/58 (18 May 2018 08:55) (154/69 - 168/58)  BP(mean): --  RR: 16 (18 May 2018 08:55) (15 - 16)  SpO2: 100% (18 May 2018 08:55) (99% - 100%)    Daily     Daily     Physical Exam:  General: No apparent distress, laying in bed comfortably with eyes closed  HEENT: B/L palpable temporal A pulses, no tenderness. Pupils sluggishly reactive b/l . cannot assess EOMI as patient says he can't see my finger, MMM. No carotid bruits.  CVS: +S1/S2, RRR  Resp: CTA b/l  GI: Soft, NT/ND  MSK: No synovitis or effusions in upper or lower extremity joints.   R leg amputee  L leg and foot wrapped in dressing.   Neuro: AAOx3  muscle strength 5/5 RUE LUE ; RLE LLE   Skin: no  rashes  Pulses 2+ and equal in radial. unable to assess DP (leg in wrapped in dressing)    LABS:                        7.7    9.00  )-----------( 306      ( 18 May 2018 04:00 )             24.6     05-18    138  |  103  |  47<H>  ----------------------------<  84  5.1   |  20<L>  |  2.90<H>    Ca    8.6      18 May 2018 04:00     CRP 7.4    TPro  7.5  /  Alb  3.0<L>  /  TBili  < 0.2<L>  /  DBili  x   /  AST  39  /  ALT  40  /  AlkPhos  96  05-18    PT/INR - ( 18 May 2018 04:00 )   PT: 11.3 SEC;   INR: 0.98          PTT - ( 18 May 2018 04:00 )  PTT:32.4 SEC      RADIOLOGY & ADDITIONAL STUDIES:   CT Head No Cont (05.18.18 @ 04:54)   FINDINGS:    Interval left lens replacement. The optic globes are otherwise smooth and   symmetric and there is no retrobulbar fatty stranding.    Encephalomalacia and gliosis in the occipital lobes bilaterally, likely   secondary to old posterior cerebral artery infarcts, unchanged.    Thalamocapsular and bilateral cerebellar lacunar infarcts, unchanged.    Moderate periventricular and deep white matter ill-defined   hypoattenuation likely chronic microvessel ischemic change.    Moderate cerebral atrophy with proportionate enlargement of the   ventricles and sulci.    Ex vacuo dilation of the occipital horns of thelateral ventricles.    There is no acute intracranial mass-effect, hemorrhage, midline shift, or   abnormal extra-axial fluid collection. The basal cisterns are patent.     Paranasal sinuses and mastoid air cells are clear. Calvarium is intact.     IMPRESSION:   No acute intracranial bleeding, mass effect, or shift. Interval left lens   replacement. If symptoms persist, MRI can be for further evaluation, if   clinically warranted.

## 2018-05-18 NOTE — ED PROVIDER NOTE - PROGRESS NOTE DETAILS
Renetta consulted, coming to see pt Optho consult indicated pt has poor retinas 2/2 diabetes on dilated eye exam, recommend ESR/CRP, no emergent optho interventions at this time; will f/u CT results Elizabeth Goldberger PGY-1: pt signed out to me, w recs from optho to check ESR and CRP to r/o temporal arteritis and ok for oupt Follow up if negative. ESR elevated at 105. Called neuro, who said generally rheum covers temp arteritis. Ordered high-dose methylpred to treat empirically for temp arteritis, even tho pt denies h/a, claudication etc, given that was reason why checked value. Called ophtho to confirm/rediscuss case - stated that is not necessarily indication to treat, will call back w further recs. Steroids not yet given (havent arrived from pharmacy) Elizabeth Goldberger PGY-1: pt signed out to me, w recs from optho to check ESR and CRP to r/o temporal arteritis and ok for oupt Follow up if negative. ESR elevated at 105. Called neuro, who said generally rheum covers temp arteritis. Called rheum attg (no fellow on call), who stated she was driving but requested emailing pt info. Ordered high-dose methylpred to treat empirically for temp arteritis, even tho pt denies h/a, claudication etc, given that was reason why checked value. Called ophtho to confirm/rediscuss case - stated that is not necessarily indication to treat, will call back w further recs. Steroids not yet given (havent arrived from pharmacy) Elizabeth Goldberger PGY-1: got call back from rheum, who said will come to see pt but may not be till later in the day. Discussed case thoroughly, said before seeing pt agreed that low susp for temporal arteritis. Recommended for the interim until can see pt to order ultrasound of temporal artery. Will call u/s to order Elizabeth Goldberger PGY-1: pt being evaluated by CDU while awaiting rheum consult. Still low susp for temporal arteritis, ezra given bilaterality. Holding off on steroids for the moment

## 2018-05-18 NOTE — ED PROVIDER NOTE - ATTENDING CONTRIBUTION TO CARE
Attending Attestation: Dr. Correia  I have personally performed a history and physical examination of the patient and discussed management with the resident as well as the patient.  I reviewed the resident's note and agree with the documented findings and plan of care.  I have authored and modified critical sections of the Provider Note, including but not limited to HPI, Physical Exam and MDM. Pt with blurred vision, mild, intermittent. NO vision loss, no pain, no focal deficits.  Unclear etiology.  CTH, f/u c Ophtho. Labs, reassess.

## 2018-05-18 NOTE — ED ADULT TRIAGE NOTE - CHIEF COMPLAINT QUOTE
alert no distress c/o double vision since yesterday ( both eyes)  no c/o dizziness weakness chest pain or SOB  as per EMS has MRSA on left foot ulcer   hx DM right BKA

## 2018-05-18 NOTE — CONSULT NOTE ADULT - SUBJECTIVE AND OBJECTIVE BOX
63yom with PMH DM, HTN, CKD, presents to the ED for diplopia and b/l eye blurry vision for the past 2-3 days.      PMH: None  Meds: None  POcHx (including surgeries/lasers/trauma):  None  Drops: None  FamHx: None  Social Hx: None  Allergies: NKDA    ROS:  General (neg), Vision (per HPI), Head and Neck (neg), Pulm (neg), CV (neg), GI (neg),  (neg), Musculoskeletal (neg), Skin/Integ (neg), Neuro (neg), Endocrine (neg), Heme (neg), All/Immuno (neg)    Mood and Affect Appropriate ( x ),  Oriented to Time, Place, and Person x 3 ( x )    Ophthalmology Exam    Visual acuity (sc): 20/20 OU  Pupils: PERRL OU, no APD  Ttono: 16 OU  Extraocular movements (EOMs): Full OU, no pain, no diplopia   Confrontational Visual Field (CVF):  Full OU  Color Plates: 12/12 OU    Pen Light Exam (PLE)  External:  Flat OU  Lids/Lashes/Lacrimal Ducts: Flat OU    Sclera/Conjunctiva:  W+Q OU  Cornea: Cl OU  Anterior Chamber: D+F OU  Iris:  Flat OU  Lens:  Cl OU    Fundus Exam: dilated with 1% tropicamide and 2.5% phenylephrine  Approval obtained from primary team for dilation  Patient aware that pupils can remained dilated for at least 4-6 hours  Exam performed with 20D lens    Vitreous: wnl OU  Disc, cup/disc: sharp and pink, 0.4 OU  Macula:  wnl OU  Vessels:  wnl OU  Periphery: wnl OU    Diagnostic Testing:      Assessment:      Plan:        Follow-Up:  Patient should follow up his/her ophthalmologist or in the [facility] within 1 week of discharge.  [address].  [place], [place] [id number] [phone] 63yom with PMH DM, HTN, CKD, presents to the ED for b/l blurry vision for the past 2-3 days. Pt is poor historian, reports 3 days ago noticed darkening in peripheral vision OD followed by darkening in peripheral vision OS. + floaters and transient diplopia for the past few days. Currently denies diplopia, denies trauma, flashes, eye pain, jaw claudication, fevers, scalp tenderness, or fevers. Reports poor VA at baseline but has become noticeably worse for past few days.    PMH: as above  Meds: see med rec  POcHx (including surgeries/lasers/trauma):  CAT sx 3 yrs ago, injxns OS for unknown reason  Drops: unknown name of gtt  FamHx: None  Social Hx: None  Allergies: NKDA    ROS:  General (neg), Vision (per HPI), Head and Neck (neg), Pulm (neg), CV (neg), GI (neg),  (neg), Musculoskeletal (neg), Skin/Integ (neg), Neuro (neg), Endocrine (neg), Heme (neg), All/Immuno (neg)    Mood and Affect Appropriate ( x ),  Oriented to Time, Place, and Person x 3 ( x )    Ophthalmology Exam    Visual acuity (cc): 20/200, 20/200 PH 20/100  Pupils: PERRL OU, no APD  Ttono: 21, 16  Extraocular movements (EOMs): Full OU, no pain, no diplopia   Confrontational Visual Field (CVF):  global deficit OU  Color Plates: 6/12, 4/12    Pen Light Exam (PLE)  External:  Flat OU  Lids/Lashes/Lacrimal Ducts: Flat OU    Sclera/Conjunctiva:  W+Q OU, OD perilimbal nevus @ 10 oclock  Cornea: Cl OU  Anterior Chamber: D+F OU  Iris:  Flat OU  Lens: 2+ NS OD, PCIOL OS    Fundus Exam: dilated with 1% tropicamide and 2.5% phenylephrine  Approval obtained from primary team for dilation  Patient aware that pupils can remained dilated for at least 4-6 hours  Exam performed with 20D lens    Vitreous: wnl OU  Disc, cup/disc: sharp and pink OU, neg NVD  Macula:  scattered MA and exudates OU  Vessels:  wnl OU  Periphery:  OU, scattered DBH OU    Diagnostic Testing: CT head pending    >>63 yom w/ DM poorly controlled p/w b/l worsening vision OU likely 2/2 worsening proliferative diab retinopathy (maybe macular edema OU). Pt currently denies diplopia  -CBC, BMP, HgA1, ESR, CRP, lipid profile  -Pt reports injxns OS in past, possibly 2/2 PDR   -FU CT head, can consider neuro consult w/ MRI head/orbits if CT abnormal per neuro  -Pt requires outpt ophtho f/u w/ further imaging    Follow-Up:  Patient should follow up his/her ophthalmologist or in the [facility] Practice within 1 week of discharge.  [address] [place] [address] [phone]

## 2018-05-18 NOTE — ED CDU PROVIDER INITIAL DAY NOTE - PROGRESS NOTE DETAILS
Pt states he takes 2 unit insulin 3 times a day before meals, does not take any night time insulin, confirmed the insulin dose by repeating 3 times Pt was seen and evaluated by rheumatology who does not think that this is temporal arteritis as the pt has had chronically elevated ESR and has had no improvement with steroids but a worsening of his overall vision since he has been in the ED. Will order MRI brain with MRA head and neck to r/o CVA and consult neurology. This patient was signed out to me by CDU day BECKI Ndiaye and attending Dr. Edwards; test results and orders reviewed.  Ophtho, Rheumatology, and Neuro consult notes reviewed; pt pending MRI/MRA as per orders.  In the interim, pt objectively noted to be resting comfortably; no issues or c/o thus far apart from ED presenting complaint, unchanged.  Will continue to monitor.

## 2018-05-19 VITALS
SYSTOLIC BLOOD PRESSURE: 189 MMHG | OXYGEN SATURATION: 100 % | DIASTOLIC BLOOD PRESSURE: 85 MMHG | TEMPERATURE: 98 F | RESPIRATION RATE: 16 BRPM | HEART RATE: 69 BPM

## 2018-05-19 LAB
BASOPHILS # BLD AUTO: 0.02 K/UL — SIGNIFICANT CHANGE UP (ref 0–0.2)
BASOPHILS NFR BLD AUTO: 0.2 % — SIGNIFICANT CHANGE UP (ref 0–2)
BUN SERPL-MCNC: 55 MG/DL — HIGH (ref 7–23)
CALCIUM SERPL-MCNC: 8.8 MG/DL — SIGNIFICANT CHANGE UP (ref 8.4–10.5)
CHLORIDE SERPL-SCNC: 108 MMOL/L — HIGH (ref 98–107)
CHOLEST SERPL-MCNC: 143 MG/DL — SIGNIFICANT CHANGE UP (ref 120–199)
CO2 SERPL-SCNC: 18 MMOL/L — LOW (ref 22–31)
CREAT SERPL-MCNC: 2.83 MG/DL — HIGH (ref 0.5–1.3)
CRP SERPL-MCNC: 6.3 MG/L — HIGH
EOSINOPHIL # BLD AUTO: 0.01 K/UL — SIGNIFICANT CHANGE UP (ref 0–0.5)
EOSINOPHIL NFR BLD AUTO: 0.1 % — SIGNIFICANT CHANGE UP (ref 0–6)
ERYTHROCYTE [SEDIMENTATION RATE] IN BLOOD: 102 MM/HR — HIGH (ref 1–15)
GLUCOSE SERPL-MCNC: 92 MG/DL — SIGNIFICANT CHANGE UP (ref 70–99)
HBA1C BLD-MCNC: 5.5 % — SIGNIFICANT CHANGE UP (ref 4–5.6)
HCT VFR BLD CALC: 23.8 % — LOW (ref 39–50)
HDLC SERPL-MCNC: 52 MG/DL — SIGNIFICANT CHANGE UP (ref 35–55)
HGB BLD-MCNC: 7.8 G/DL — LOW (ref 13–17)
IMM GRANULOCYTES # BLD AUTO: 0.05 # — SIGNIFICANT CHANGE UP
IMM GRANULOCYTES NFR BLD AUTO: 0.6 % — SIGNIFICANT CHANGE UP (ref 0–1.5)
LIPID PNL WITH DIRECT LDL SERPL: 84 MG/DL — SIGNIFICANT CHANGE UP
LYMPHOCYTES # BLD AUTO: 1.53 K/UL — SIGNIFICANT CHANGE UP (ref 1–3.3)
LYMPHOCYTES # BLD AUTO: 18.7 % — SIGNIFICANT CHANGE UP (ref 13–44)
MCHC RBC-ENTMCNC: 28.3 PG — SIGNIFICANT CHANGE UP (ref 27–34)
MCHC RBC-ENTMCNC: 32.8 % — SIGNIFICANT CHANGE UP (ref 32–36)
MCV RBC AUTO: 86.2 FL — SIGNIFICANT CHANGE UP (ref 80–100)
MONOCYTES # BLD AUTO: 0.66 K/UL — SIGNIFICANT CHANGE UP (ref 0–0.9)
MONOCYTES NFR BLD AUTO: 8.1 % — SIGNIFICANT CHANGE UP (ref 2–14)
NEUTROPHILS # BLD AUTO: 5.91 K/UL — SIGNIFICANT CHANGE UP (ref 1.8–7.4)
NEUTROPHILS NFR BLD AUTO: 72.3 % — SIGNIFICANT CHANGE UP (ref 43–77)
NRBC # FLD: 0 — SIGNIFICANT CHANGE UP
PLATELET # BLD AUTO: 293 K/UL — SIGNIFICANT CHANGE UP (ref 150–400)
PMV BLD: 10.8 FL — SIGNIFICANT CHANGE UP (ref 7–13)
POTASSIUM SERPL-MCNC: 4.8 MMOL/L — SIGNIFICANT CHANGE UP (ref 3.5–5.3)
POTASSIUM SERPL-SCNC: 4.8 MMOL/L — SIGNIFICANT CHANGE UP (ref 3.5–5.3)
RBC # BLD: 2.76 M/UL — LOW (ref 4.2–5.8)
RBC # FLD: 15.8 % — HIGH (ref 10.3–14.5)
SODIUM SERPL-SCNC: 142 MMOL/L — SIGNIFICANT CHANGE UP (ref 135–145)
TRIGL SERPL-MCNC: 78 MG/DL — SIGNIFICANT CHANGE UP (ref 10–149)
WBC # BLD: 8.18 K/UL — SIGNIFICANT CHANGE UP (ref 3.8–10.5)
WBC # FLD AUTO: 8.18 K/UL — SIGNIFICANT CHANGE UP (ref 3.8–10.5)

## 2018-05-19 PROCEDURE — 99217: CPT

## 2018-05-19 RX ORDER — CALCIUM ACETATE 667 MG
667 TABLET ORAL
Refills: 0 | Status: DISCONTINUED | OUTPATIENT
Start: 2018-05-19 | End: 2018-05-22

## 2018-05-19 RX ORDER — DOCUSATE SODIUM 100 MG
200 CAPSULE ORAL
Refills: 0 | Status: DISCONTINUED | OUTPATIENT
Start: 2018-05-19 | End: 2018-05-22

## 2018-05-19 RX ORDER — NIFEDIPINE 30 MG
120 TABLET, EXTENDED RELEASE 24 HR ORAL
Refills: 0 | Status: DISCONTINUED | OUTPATIENT
Start: 2018-05-19 | End: 2018-05-22

## 2018-05-19 RX ORDER — SENNA PLUS 8.6 MG/1
2 TABLET ORAL AT BEDTIME
Refills: 0 | Status: DISCONTINUED | OUTPATIENT
Start: 2018-05-19 | End: 2018-05-22

## 2018-05-19 RX ORDER — TAMSULOSIN HYDROCHLORIDE 0.4 MG/1
0.4 CAPSULE ORAL AT BEDTIME
Refills: 0 | Status: DISCONTINUED | OUTPATIENT
Start: 2018-05-19 | End: 2018-05-22

## 2018-05-19 RX ORDER — ATORVASTATIN CALCIUM 80 MG/1
40 TABLET, FILM COATED ORAL
Refills: 0 | Status: DISCONTINUED | OUTPATIENT
Start: 2018-05-19 | End: 2018-05-22

## 2018-05-19 RX ORDER — LABETALOL HCL 100 MG
100 TABLET ORAL
Refills: 0 | Status: DISCONTINUED | OUTPATIENT
Start: 2018-05-19 | End: 2018-05-22

## 2018-05-19 RX ADMIN — Medication 100 MILLIGRAM(S): at 10:12

## 2018-05-19 RX ADMIN — Medication 667 MILLIGRAM(S): at 13:25

## 2018-05-19 NOTE — ED CDU PROVIDER SUBSEQUENT DAY NOTE - CONSTITUTIONAL, MLM
Well appearing, well nourished, awake, alert, oriented to person, place, time/situation and in no apparent distress.  Pt. verbalizing in full, clear, effortless sentences. normal...

## 2018-05-19 NOTE — ED CDU PROVIDER SUBSEQUENT DAY NOTE - ATTENDING CONTRIBUTION TO CARE
I performed a face-to-face evaluation of the patient and performed a history and physical examination. I agree with the history and physical examination.    Intermittent dark vision R eye. No trauma. Ophtho didn't see retinal detachment or vitreous hemorrhage. Neuro recommended MRI/MRI. Patient unable to tolerate closed MRI. Stable for discharge back to rehab facility for open MRI.

## 2018-05-19 NOTE — ED CDU PROVIDER SUBSEQUENT DAY NOTE - PMH
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

## 2018-05-19 NOTE — ED CDU PROVIDER SUBSEQUENT DAY NOTE - PROGRESS NOTE DETAILS
Pt objectively noted to be resting comfortably in the interim; no issues or c/o thus far.  Pt. will be signed out to the day CDU PA and attending at 0700 hrs. Nasir: Supervised BECKI Elder 5/19/18 Pt states cannot tolerate enclosed MRI even with medication - will dc patient back to rehab for outpt open MRI and further work up. Spoke with staff at [facility] [place] who understand plan and will reiterate to covering doctor. Pt. stable to transfer back.

## 2018-05-19 NOTE — PROVIDER CONTACT NOTE (OTHER) - RECOMMENDATIONS
d/c to rehab; PT Goals: 1. Increase bed mobility and transfers cg x 1. 2. Increase ambulation to greater than 100 feet x 1 with rolling walker, cg x 1. 3. Independent HEP.

## 2018-05-19 NOTE — ED CDU PROVIDER DISPOSITION NOTE - CLINICAL COURSE
Nasir: Intermittent dark vision R eye. No trauma. Ophtho didn't see retinal detachment or vitreous hemorrhage. Neuro recommended MRI/MRI. Patient unable to tolerate closed MRI. Stable for discharge back to rehab facility for open MRI.

## 2018-05-19 NOTE — PROVIDER CONTACT NOTE (OTHER) - ASSESSMENT
BRIDGETTE contacted by Pt MD who states CDU Pt, amputee needs ambulette to return to [facility] [address]. [phone], BRIDGETTE contacted [facility] [phone] spoke with staff who states Pt is not eligible through them and provided [employer] #[phone] SW contacted and spoke with staff Laurence who took information and contacted a supervisor who stated the [facility] can not provide transport or auth at this time, instructed SW to have facility make arrangement. BRIDGETTE contacted [facility] spoke with MARCO Reddy and Ms. Allen LARA supervisor who stated they do not make those arrangements. SW contacted [facility] spoke with Ary who arranged ambulette Trip# [id number] for 4:30pm as a Bill Back Pending Auth. BRIDGETTE prepared Transport follow up form and emailed managers. Pt and medical staff are aware and in agreement with plan. No further SW intervention required at this time.
Patient found supine in bed in NAD, patient agreeable to PT, right LE with dressing CDI, +left BKA, +IV. Spoke with MARCO Mena and patient able to participate in PT.  Patient performed bed mobility and sit to stand transfers with min a x 2, rolling walker, left LE prosthesis and WBAT right LE. Patient ambulated 5 feet with rolling walker, min a x 1. Patient returned to supine and left in NAD, call bell in reach.

## 2018-05-19 NOTE — ED CDU PROVIDER SUBSEQUENT DAY NOTE - HISTORY
ED Provider Note HPI: "62 yo male with PMH of right BKA, left foot MRSA infection of left foot ulcer, DM, HTN, CKD, presents to the ED for diplopia and b/l eye blurry vision for the past 2-3 days. Patient presenting from [place] where he is being treated for his foot infection. Patient has prescription for glasses but states they broke a long time ago. States these present symptoms are new and did not begin right after stopping using glasses. Denies trauma, n/v, fever, chills, paresthesias, gait abnormalities, focal weakness."  Pt. was evaluated in the ED; Ophtho, Rheum, and Neuro were consulted.  Pt. pending MRI/A head/neck as per orders and repeat labs; no issues in the interim.

## 2018-05-19 NOTE — PROGRESS NOTE ADULT - SUBJECTIVE AND OBJECTIVE BOX
JAMES PATRICK63yMale     Patient is a 63y old  Male who presents with a chief complaint of acute change in vision x 3 days     HPI:  64yo RHm on Aspirin 81 with PMH DM, HTN, CKD PAD, CVA (could not say deficit) recent fem-pop, right BKA presents to the ED for worsening vision for the past 2-3 days. Pt is poor historian. Endorses chronic poor vision which worsened 3  ago, Can not seen coolors or movement in periphery Currently denies diplopia, denies trauma, flashes, eye pain, jaw claudication, fevers, scalp tenderness, or fevers.s. no difficulty with language.  No dizziness, vertigo or new hearing loss.  . No difficulty with swallowing.  No focal weakness.  No focal sensory changes. NIHSS 5 preMRS 3     REVIEW OF SYSTEMS:  Constitutional: No fever, chills, fatigue, weakness  Eyes: no eye pain, visual disturbances, or discharge  ENT:  No difficulty hearing, tinnitus, vertigo; No sinus or throat pain  Neck: No pain or stiffness  Respiratory: No cough, dyspnea, wheezing   Cardiovascular: No chest pain, palpitations,   Gastrointestinal: No abdominal or epigastric pain. No nausea, vomiting  No diarrhea or constipation.   Genitourinary: No dysuria, frequency, hematuria or incontinence  Neurological: see HPI  Psychiatric: No depression, anxiety, mood swings or difficulty sleeping  Musculoskeletal: No joint pain or swelling; No muscle, back or extremity pain  Skin: No itching, burning, rashes or lesions   Lymph Nodes: No enlarged glands  Endocrine: No heat or cold intolerance; No hair loss, No h/o diabetes or thyroid dysfunction  Allergy and Immunologic: No hives or eczema    MEDICATIONS    dextrose 40% Gel 15 Gram(s) Oral once PRN  dextrose 5%. 1000 milliLiter(s) IV Continuous <Continuous>  dextrose 50% Injectable 12.5 Gram(s) IV Push once  dextrose 50% Injectable 25 Gram(s) IV Push once  dextrose 50% Injectable 25 Gram(s) IV Push once  glucagon  Injectable 1 milliGRAM(s) IntraMuscular once PRN  insulin lispro Injectable (HumaLOG) 2 Unit(s) SubCutaneous three times a day before meals    PMH:   Kidney disease  HTN (hypertension)  DM (diabetes mellitus)     PSH:   H/O peripheral artery bypass  S/P BKA (below knee amputation) unilateral, right    FAMILY HISTORY:  Family history of diabetes mellitus (Mother)    SOCIAL HISTORY:  No history of tobacco or alcohol use     Allergies  No Known Allergies    Vital Signs Last 24 Hrs  T(C): 36.  HR: 80  BP: 168/70  RR: 16    On Neurological Examination:    Mental Status - Patient is alert, awake, oriented X3. fluent mild slowed responses, no dysarthria    Cranial Nerves - global visual deficits most severe in periphery, EOMI, , V1-V3 intact, no gross facial asymmetry, tongue/uvula midline    Motor Exam -   Right upper 5-/5  Left uppper 5-/5  Right lower 5/5 BKA  Left lower 5-/5     nml bulk/tone    Sensory    Intact to light touch and pinprick bilaterally    Coord: RUE ataxia, loss of check, LUE subtle dysmetria    Gait -  deferred    Reflexes:         mute to 1+        LABS:  CBC Full  -  ( 18 May 2018 04:00 )  WBC Count : 9.00 K/uL  Hemoglobin : 7.7 g/dL  Hematocrit : 24.6 %  Platelet Count - Automated : 306 K/uL  Mean Cell Volume : 86.3 fL  Mean Cell Hemoglobin : 27.0 pg  Mean Cell Hemoglobin Concentration : 31.3 %  Auto Neutrophil # : 5.26 K/uL  Auto Lymphocyte # : 2.10 K/uL  Auto Monocyte # : 0.63 K/uL  Auto Eosinophil # : 0.89 K/uL  Auto Basophil # : 0.08 K/uL  Auto Neutrophil % : 58.5 %  Auto Lymphocyte % : 23.3 %  Auto Monocyte % : 7.0 %  Auto Eosinophil % : 9.9 %  Auto Basophil % : 0.9 %    05-18    138  |  103  |  47<H>  ----------------------------<  84  5.1   |  20<L>  |  2.90<H>    Ca    8.6      18 May 2018 04:00    TPro  7.5  /  Alb  3.0<L>  /  TBili  < 0.2<L>  /  DBili  x   /  AST  39  /  ALT  40  /  AlkPhos  96  05-18    LIVER FUNCTIONS - ( 18 May 2018 04:00 )  Alb: 3.0 g/dL / Pro: 7.5 g/dL / ALK PHOS: 96 u/L / ALT: 40 u/L / AST: 39 u/L / GGT: x           Hemoglobin A1C:     PT/INR - ( 18 May 2018 04:00 )   PT: 11.3 SEC;   INR: 0.98        PTT - ( 18 May 2018 04:00 )  PTT:32.4 SEC    RADIOLOGY  CTH < from: CT Head No Cont (05.18.18 @ 04:54) >  nterval left lens replacement. The optic globes are otherwise smooth and   symmetric and there is no retrobulbar fatty stranding.    Encephalomalacia and gliosis in the occipital lobes bilaterally, likely   secondary to old posterior cerebral artery infarcts, unchanged.    Thalamocapsular and bilateral cerebellar lacunar infarcts, unchanged.    Moderate periventricular and deep white matter ill-defined   hypoattenuation likely chronic microvessel ischemic change.    Moderate cerebral atrophy with proportionate enlargement of the   ventricles and sulci.    Ex vacuo dilation of the occipital horns of thelateral ventricles.    There is no acute intracranial mass-effect, hemorrhage, midline shift, or   abnormal extra-axial fluid collection. The basal cisterns are patent.     Paranasal sinuses and mastoid air cells are clear. Calvarium is intact.     IMPRESSION:   No acute intracranial bleeding, mass effect, or shift. Interval left lens   replacement. If symptoms persist, MRI can be for further evaluation, if   clinically warranted.    < end of copied text >                        Assessment and Recommendation:   · Assessment		  64yo RHm on Aspirin 81 with PMH DM, HTN, CKD PAD, CVA (could not say deficit) recent fem-pop, right BKA presents to the ED for worsening vision for the past 2-3 days.PE as above VF deficits in all fields bilaterally ataxia R>L reviewed CTH chronic bilaterally PCa, cerebellar CVA NHSS 5,pre-MRS 3 Suspect vision deficits in setting of uncontrolled DM with B/L PCa strokes. Given evidence of chronic posterior circulation strokes, with visual deificts and ataxia on exam would R/O new infarct.           Problem/Recommendation - 1:  Problem: R/O Vision loss. Recommendation: possibly hemianopsia b/l (r/o new infarct) in the setting of old b/l PCA infarcts.   Plan:   -MRI brain w/o contrast  -MRA H/N w/o contrast (avoid contrast due to worsening Cr)   -secondary stroke prevention core measures w/ risk factor control  -start ASA if not already on it as a home medication   -Labs: A1c, Lipid Panel   -tele  -PT/OT  -sedation for mRI  Schoenberg, Laura Gray (MD)

## 2018-05-30 ENCOUNTER — APPOINTMENT (OUTPATIENT)
Dept: VASCULAR SURGERY | Facility: CLINIC | Age: 63
End: 2018-05-30
Payer: MEDICAID

## 2018-05-30 VITALS
SYSTOLIC BLOOD PRESSURE: 157 MMHG | TEMPERATURE: 98 F | BODY MASS INDEX: 24.11 KG/M2 | DIASTOLIC BLOOD PRESSURE: 63 MMHG | WEIGHT: 150 LBS | HEART RATE: 66 BPM | HEIGHT: 66 IN

## 2018-05-30 DIAGNOSIS — L98.499 STRICTURE OF ARTERY: ICD-10-CM

## 2018-05-30 DIAGNOSIS — I77.1 STRICTURE OF ARTERY: ICD-10-CM

## 2018-05-30 PROCEDURE — 99024 POSTOP FOLLOW-UP VISIT: CPT

## 2018-05-30 RX ORDER — ACETAMINOPHEN 325 MG/1
325 TABLET, FILM COATED ORAL
Refills: 0 | Status: ACTIVE | COMMUNITY

## 2018-05-30 RX ORDER — SENNOSIDES 8.6 MG TABLETS 8.6 MG/1
8.6 TABLET ORAL
Refills: 0 | Status: ACTIVE | COMMUNITY

## 2018-06-12 ENCOUNTER — APPOINTMENT (OUTPATIENT)
Dept: VASCULAR SURGERY | Facility: CLINIC | Age: 63
End: 2018-06-12
Payer: MEDICAID

## 2018-06-12 VITALS
SYSTOLIC BLOOD PRESSURE: 159 MMHG | DIASTOLIC BLOOD PRESSURE: 69 MMHG | TEMPERATURE: 97.7 F | HEIGHT: 66 IN | HEART RATE: 64 BPM | BODY MASS INDEX: 24.11 KG/M2 | WEIGHT: 150 LBS

## 2018-06-12 DIAGNOSIS — I77.1 STRICTURE OF ARTERY: ICD-10-CM

## 2018-06-12 PROCEDURE — 99024 POSTOP FOLLOW-UP VISIT: CPT

## 2018-06-12 RX ORDER — PREDNISOLONE ACETATE 10 MG/ML
1 SUSPENSION/ DROPS OPHTHALMIC
Qty: 5 | Refills: 0 | Status: ACTIVE | COMMUNITY
Start: 2018-01-17

## 2018-07-16 NOTE — PROGRESS NOTE ADULT - PROBLEM SELECTOR PLAN 6
Subjective:       Patient ID: Alma Rosa Carranza is a 53 y.o. female.    Chief Complaint: Pre-op Exam    HPI    Pt will be having a mastectomy per Dr. Angelica Bain on 7/23/18 at [facility]. She was recently dxed with breast cancer. She reports hx of breast CA in her mother and sister.    No acute problems on today    No issues with anesthesia in the past    No hx of acute pulmonology/cardiac issues in the past     No recent hospitalizations       Past Medical History:   Diagnosis Date    Arthralgia      Past Surgical History:   Procedure Laterality Date    FOOT SURGERY Bilateral     with Hardware Placement    TUBAL LIGATION       Past Surgical History:   Procedure Laterality Date    FOOT SURGERY Bilateral     with Hardware Placement    TUBAL LIGATION       Social History     Social History    Marital status: Single     Spouse name: N/A    Number of children: 3    Years of education: N/A     Occupational History    [employer]     Social History Main Topics    Smoking status: Never Smoker    Smokeless tobacco: Never Used    Alcohol use Yes      Comment: Occasionally    Drug use: No    Sexual activity: Yes     Partners: Male     Birth control/ protection: See Surgical Hx      Comment: 1 partner     Other Topics Concern    Not on file     Social History Narrative    No smokers or pets in household.     Review of patient's allergies indicates:   Allergen Reactions    Clindamycin Nausea And Vomiting    Hydrocodone Nausea And Vomiting     Current Outpatient Prescriptions   Medication Sig    acetaminophen (TYLENOL) 500 MG tablet Take 1,000 mg by mouth every 6 (six) hours as needed for Pain.    diphenhydramine-acetaminophen (TYLENOL PM)  mg Tab Take 1 tablet by mouth nightly as needed.    Lactobacillus acidophilus (PROBIOTIC ACIDOPHILUS ORAL) Take 1 capsule by mouth once daily.    pantoprazole (PROTONIX)  40 MG tablet TAKE 1 TABLET(40 MG) BY MOUTH EVERY DAY    pedi multivit no.19-folic acid (CHILDREN'S MULTI-VIT GUMMIES) 200 mcg Chew Take by mouth.     No current facility-administered medications for this visit.            Review of Systems   Constitutional: Negative for activity change, appetite change, chills, diaphoresis, fatigue, fever and unexpected weight change.   HENT: Negative for congestion, ear pain, postnasal drip, rhinorrhea, sinus pain, sinus pressure, sneezing, sore throat, tinnitus, trouble swallowing and voice change.    Eyes: Negative for photophobia, pain and visual disturbance.   Respiratory: Negative for cough, chest tightness, shortness of breath and wheezing.    Cardiovascular: Negative for chest pain, palpitations and leg swelling.   Gastrointestinal: Negative for abdominal distention, abdominal pain, constipation, diarrhea, nausea and vomiting.   Genitourinary: Negative for dysuria.   Musculoskeletal: Negative for arthralgias, back pain, joint swelling, neck pain and neck stiffness.   Allergic/Immunologic: Negative for immunocompromised state.   Neurological: Negative for dizziness, tremors, seizures, syncope, facial asymmetry, speech difficulty, weakness, light-headedness, numbness and headaches.   Hematological: Negative for adenopathy. Does not bruise/bleed easily.   Psychiatric/Behavioral: Negative for confusion and sleep disturbance.       Objective:      Physical Exam   Constitutional: She is oriented to person, place, and time.   HENT:   Head: Normocephalic and atraumatic.   Right Ear: Tympanic membrane normal.   Left Ear: Tympanic membrane normal.   Eyes: Conjunctivae and EOM are normal.   Neck: Normal range of motion. Neck supple.   Cardiovascular: Normal rate, regular rhythm, normal heart sounds and intact distal pulses.    Pulmonary/Chest: Effort normal and breath sounds normal.   Abdominal: Soft. Bowel sounds are normal.   Musculoskeletal: Normal range of motion.   Neurological:  She is alert and oriented to person, place, and time.   Skin: Skin is warm and dry. Capillary refill takes less than 2 seconds.   Psychiatric: She has a normal mood and affect.       Assessment:     Vitals:    07/16/18 0837   BP: 116/74   Pulse: 84   Temp: 97.7 °F (36.5 °C)         1. Pre-op exam    2. Gastroesophageal reflux disease without esophagitis    3. Obesity, Class I, BMI 30-34.9        Plan:   Pre-op exam  -     CBC auto differential; Future; Expected date: 07/16/2018  -     Comprehensive metabolic panel; Future; Expected date: 07/16/2018  -     Protime-INR; Future; Expected date: 07/16/2018  -     X-Ray Chest PA And Lateral; Future; Expected date: 07/16/2018  -     APTT; Future; Expected date: 07/16/2018  -     EKG 12-lead; Future; Expected date: 07/16/2018    Gastroesophageal reflux disease without esophagitis    Obesity, Class I, BMI 30-34.9    as above  Review and clearance to follow    Results:  Labs, CXR, EKG all WNL. Pt is cleared for surgery with low risk of damaris-operative complications.     - Pt on oral medications at home. Well controlled  - c/w ISS while inpatient, will need to adjust oral meds upon dc given new baseline renal function

## 2018-07-25 ENCOUNTER — APPOINTMENT (OUTPATIENT)
Dept: VASCULAR SURGERY | Facility: CLINIC | Age: 63
End: 2018-07-25

## 2018-07-25 NOTE — PROGRESS NOTE ADULT - PROBLEM SELECTOR PLAN 6
Detail Level: Detailed -Pt on oral medications at home.   -Start on sliding scale.  -Hemoglobin a1c: 5.9%  -Diabetic precautions  -FS WNL while here

## 2018-08-06 NOTE — PATIENT PROFILE ADULT. - AS SC BRADEN MOBILITY
Pt is still have irritably and motivation issues with the Celexa medication? Should medication be increased, or should a different med be prescribed?    (3) slightly limited

## 2018-08-10 NOTE — PROGRESS NOTE ADULT - PROBLEM SELECTOR PROBLEM 5
Topical Sulfur Applications Counseling: Topical Sulfur Counseling: Patient counseled that this medication may cause skin irritation or allergic reactions.  In the event of skin irritation, the patient was advised to reduce the amount of the drug applied or use it less frequently.   The patient verbalized understanding of the proper use and possible adverse effects of topical sulfur application.  All of the patient's questions and concerns were addressed. Hypertension, unspecified type Topical Clindamycin Pregnancy And Lactation Text: This medication is Pregnancy Category B and is considered safe during pregnancy. It is unknown if it is excreted in breast milk. Erythromycin Counseling:  I discussed with the patient the risks of erythromycin including but not limited to GI upset, allergic reaction, drug rash, diarrhea, increase in liver enzymes, and yeast infections. Tetracycline Pregnancy And Lactation Text: This medication is Pregnancy Category D and not consider safe during pregnancy. It is also excreted in breast milk. Dapsone Counseling: I discussed with the patient the risks of dapsone including but not limited to hemolytic anemia, agranulocytosis, rashes, methemoglobinemia, kidney failure, peripheral neuropathy, headaches, GI upset, and liver toxicity.  Patients who start dapsone require monitoring including baseline LFTs and weekly CBCs for the first month, then every month thereafter.  The patient verbalized understanding of the proper use and possible adverse effects of dapsone.  All of the patient's questions and concerns were addressed. Topical Retinoid counseling:  Patient advised to apply a pea-sized amount only at bedtime and wait 30 minutes after washing their face before applying.  If too drying, patient may add a non-comedogenic moisturizer. The patient verbalized understanding of the proper use and possible adverse effects of retinoids.  All of the patient's questions and concerns were addressed. Azithromycin Counseling:  I discussed with the patient the risks of azithromycin including but not limited to GI upset, allergic reaction, drug rash, diarrhea, and yeast infections. Minocycline Counseling: Patient advised regarding possible photosensitivity and discoloration of the teeth, skin, lips, tongue and gums.  Patient instructed to avoid sunlight, if possible.  When exposed to sunlight, patients should wear protective clothing, sunglasses, and sunscreen.  The patient was instructed to call the office immediately if the following severe adverse effects occur:  hearing changes, easy bruising/bleeding, severe headache, or vision changes.  The patient verbalized understanding of the proper use and possible adverse effects of minocycline.  All of the patient's questions and concerns were addressed. Isotretinoin Pregnancy And Lactation Text: This medication is Pregnancy Category X and is considered extremely dangerous during pregnancy. It is unknown if it is excreted in breast milk. Spironolactone Counseling: Patient advised regarding risks of diarrhea, abdominal pain, hyperkalemia, birth defects (for female patients), liver toxicity and renal toxicity. The patient may need blood work to monitor liver and kidney function and potassium levels while on therapy. The patient verbalized understanding of the proper use and possible adverse effects of spironolactone.  All of the patient's questions and concerns were addressed. Tetracycline Counseling: Patient counseled regarding possible photosensitivity and increased risk for sunburn.  Patient instructed to avoid sunlight, if possible.  When exposed to sunlight, patients should wear protective clothing, sunglasses, and sunscreen.  The patient was instructed to call the office immediately if the following severe adverse effects occur:  hearing changes, easy bruising/bleeding, severe headache, or vision changes.  The patient verbalized understanding of the proper use and possible adverse effects of tetracycline.  All of the patient's questions and concerns were addressed. Patient understands to avoid pregnancy while on therapy due to potential birth defects. Bactrim Pregnancy And Lactation Text: This medication is Pregnancy Category D and is known to cause fetal risk.  It is also excreted in breast milk. Spironolactone Pregnancy And Lactation Text: This medication can cause feminization of the male fetus and should be avoided during pregnancy. The active metabolite is also found in breast milk. Isotretinoin Counseling: Patient should get monthly blood tests, not donate blood, not drive at night if vision affected, not share medication, and not undergo elective surgery for 6 months after tx completed. Side effects reviewed, pt to contact office should one occur. Tazorac Counseling:  Patient advised that medication is irritating and drying.  Patient may need to apply sparingly and wash off after an hour before eventually leaving it on overnight.  The patient verbalized understanding of the proper use and possible adverse effects of tazorac.  All of the patient's questions and concerns were addressed. Dapsone Pregnancy And Lactation Text: This medication is Pregnancy Category C and is not considered safe during pregnancy or breast feeding. Doxycycline Counseling:  Patient counseled regarding possible photosensitivity and increased risk for sunburn.  Patient instructed to avoid sunlight, if possible.  When exposed to sunlight, patients should wear protective clothing, sunglasses, and sunscreen.  The patient was instructed to call the office immediately if the following severe adverse effects occur:  hearing changes, easy bruising/bleeding, severe headache, or vision changes.  The patient verbalized understanding of the proper use and possible adverse effects of doxycycline.  All of the patient's questions and concerns were addressed. Benzoyl Peroxide Counseling: Patient counseled that medicine may cause skin irritation and bleach clothing.  In the event of skin irritation, the patient was advised to reduce the amount of the drug applied or use it less frequently.   The patient verbalized understanding of the proper use and possible adverse effects of benzoyl peroxide.  All of the patient's questions and concerns were addressed. Topical Sulfur Applications Pregnancy And Lactation Text: This medication is Pregnancy Category C and has an unknown safety profile during pregnancy. It is unknown if this topical medication is excreted in breast milk. Azithromycin Pregnancy And Lactation Text: This medication is considered safe during pregnancy and is also secreted in breast milk. Tazorac Pregnancy And Lactation Text: This medication is not safe during pregnancy. It is unknown if this medication is excreted in breast milk. Detail Level: Zone Benzoyl Peroxide Pregnancy And Lactation Text: This medication is Pregnancy Category C. It is unknown if benzoyl peroxide is excreted in breast milk. Topical Clindamycin Counseling: Patient counseled that this medication may cause skin irritation or allergic reactions.  In the event of skin irritation, the patient was advised to reduce the amount of the drug applied or use it less frequently.   The patient verbalized understanding of the proper use and possible adverse effects of clindamycin.  All of the patient's questions and concerns were addressed. Bactrim Counseling:  I discussed with the patient the risks of sulfa antibiotics including but not limited to GI upset, allergic reaction, drug rash, diarrhea, dizziness, photosensitivity, and yeast infections.  Rarely, more serious reactions can occur including but not limited to aplastic anemia, agranulocytosis, methemoglobinemia, blood dyscrasias, liver or kidney failure, lung infiltrates or desquamative/blistering drug rashes. High Dose Vitamin A Counseling: Side effects reviewed, pt to contact office should one occur. Birth Control Pills Counseling: Birth Control Pill Counseling: I discussed with the patient the potential side effects of OCPs including but not limited to increased risk of stroke, heart attack, thrombophlebitis, deep venous thrombosis, hepatic adenomas, breast changes, GI upset, headaches, and depression.  The patient verbalized understanding of the proper use and possible adverse effects of OCPs. All of the patient's questions and concerns were addressed. Erythromycin Pregnancy And Lactation Text: This medication is Pregnancy Category B and is considered safe during pregnancy. It is also excreted in breast milk. High Dose Vitamin A Pregnancy And Lactation Text: High dose vitamin A therapy is contraindicated during pregnancy and breast feeding. Include Pregnancy/Lactation Warning?: No Topical Retinoid Pregnancy And Lactation Text: This medication is Pregnancy Category C. It is unknown if this medication is excreted in breast milk. Birth Control Pills Pregnancy And Lactation Text: This medication should be avoided if pregnant and for the first 30 days post-partum. Doxycycline Pregnancy And Lactation Text: This medication is Pregnancy Category D and not consider safe during pregnancy. It is also excreted in breast milk but is considered safe for shorter treatment courses.

## 2018-09-09 NOTE — CONSULT NOTE ADULT - CONSULT REQUESTED BY NAME
Data: [facility]: 2018  9:20 AM through the ED.  Reason for maternal/fetal assessment is leaking vaginal fluid. Patient reports she began leaking of fluid around 0800 this morning and it has remained continuous and has increased in amount since that time.  Patient reports some lower abdominal cramping, but denies regular contractions.  Patient grossly ruptured with clear fluid noted upon exam. SVE /-2. Patient is a .  Prenatal record reviewed. Pregnancy has been uncomplicated..  Gestational Age 40w0d. VSS. Fetal movement present. Patient denies uterine contractions, vaginal bleeding, pelvic pressure, nausea, vomiting, headache, visual disturbances, epigastric or URQ pain, significant edema. Support person is present.   Action: Verbal consent for EFM. Triage assessment completed. Bill of rights reviewed.  Response: Patient verbalized agreement with plan. Will contact Dr Ryan Patel with update and further orders.  
ED
primary provider
ED

## 2019-07-08 ENCOUNTER — INPATIENT (INPATIENT)
Facility: HOSPITAL | Age: 64
LOS: 8 days | Discharge: NURSING, REHAB OR LONG-TERM CARE | End: 2019-07-17
Attending: INTERNAL MEDICINE | Admitting: INTERNAL MEDICINE
Payer: MEDICAID

## 2019-07-08 VITALS
WEIGHT: 115.08 LBS | SYSTOLIC BLOOD PRESSURE: 177 MMHG | TEMPERATURE: 97 F | DIASTOLIC BLOOD PRESSURE: 88 MMHG | HEIGHT: 66 IN | OXYGEN SATURATION: 99 % | HEART RATE: 83 BPM | RESPIRATION RATE: 16 BRPM

## 2019-07-08 DIAGNOSIS — Z98.890 OTHER SPECIFIED POSTPROCEDURAL STATES: Chronic | ICD-10-CM

## 2019-07-08 DIAGNOSIS — Z89.511 ACQUIRED ABSENCE OF RIGHT LEG BELOW KNEE: Chronic | ICD-10-CM

## 2019-07-08 DIAGNOSIS — Z89.611 ACQUIRED ABSENCE OF RIGHT LEG ABOVE KNEE: Chronic | ICD-10-CM

## 2019-07-08 DIAGNOSIS — Z89.612 ACQUIRED ABSENCE OF LEFT LEG ABOVE KNEE: Chronic | ICD-10-CM

## 2019-07-08 LAB
ALBUMIN SERPL ELPH-MCNC: 2.4 G/DL — LOW (ref 3.3–5)
ALP SERPL-CCNC: 138 U/L — HIGH (ref 40–120)
ALT FLD-CCNC: 38 U/L — SIGNIFICANT CHANGE UP (ref 12–78)
ANION GAP SERPL CALC-SCNC: 7 MMOL/L — SIGNIFICANT CHANGE UP (ref 5–17)
APTT BLD: 32.7 SEC — SIGNIFICANT CHANGE UP (ref 28.5–37)
AST SERPL-CCNC: 36 U/L — SIGNIFICANT CHANGE UP (ref 15–37)
BILIRUB SERPL-MCNC: 0.2 MG/DL — SIGNIFICANT CHANGE UP (ref 0.2–1.2)
BUN SERPL-MCNC: 63 MG/DL — HIGH (ref 7–23)
CALCIUM SERPL-MCNC: 8.5 MG/DL — SIGNIFICANT CHANGE UP (ref 8.5–10.1)
CHLORIDE SERPL-SCNC: 100 MMOL/L — SIGNIFICANT CHANGE UP (ref 96–108)
CK MB CFR SERPL CALC: <1 NG/ML — SIGNIFICANT CHANGE UP (ref 0.5–3.6)
CO2 SERPL-SCNC: 32 MMOL/L — HIGH (ref 22–31)
CREAT SERPL-MCNC: 3.49 MG/DL — HIGH (ref 0.5–1.3)
GLUCOSE SERPL-MCNC: 174 MG/DL — HIGH (ref 70–99)
HCT VFR BLD CALC: 38.4 % — LOW (ref 39–50)
HGB BLD-MCNC: 12 G/DL — LOW (ref 13–17)
INR BLD: 0.97 RATIO — SIGNIFICANT CHANGE UP (ref 0.88–1.16)
LACTATE SERPL-SCNC: 1.2 MMOL/L — SIGNIFICANT CHANGE UP (ref 0.7–2)
LIDOCAIN IGE QN: 469 U/L — HIGH (ref 73–393)
MCHC RBC-ENTMCNC: 30 PG — SIGNIFICANT CHANGE UP (ref 27–34)
MCHC RBC-ENTMCNC: 31.3 GM/DL — LOW (ref 32–36)
MCV RBC AUTO: 96 FL — SIGNIFICANT CHANGE UP (ref 80–100)
NRBC # BLD: 0 /100 WBCS — SIGNIFICANT CHANGE UP (ref 0–0)
PLATELET # BLD AUTO: 264 K/UL — SIGNIFICANT CHANGE UP (ref 150–400)
POTASSIUM SERPL-MCNC: 3.2 MMOL/L — LOW (ref 3.5–5.3)
POTASSIUM SERPL-SCNC: 3.2 MMOL/L — LOW (ref 3.5–5.3)
PROT SERPL-MCNC: 7.3 GM/DL — SIGNIFICANT CHANGE UP (ref 6–8.3)
PROTHROM AB SERPL-ACNC: 10.8 SEC — SIGNIFICANT CHANGE UP (ref 10–12.9)
RBC # BLD: 4 M/UL — LOW (ref 4.2–5.8)
RBC # FLD: 18.5 % — HIGH (ref 10.3–14.5)
SODIUM SERPL-SCNC: 139 MMOL/L — SIGNIFICANT CHANGE UP (ref 135–145)
TROPONIN I SERPL-MCNC: 0.03 NG/ML — SIGNIFICANT CHANGE UP (ref 0.01–0.04)
WBC # BLD: 6.12 K/UL — SIGNIFICANT CHANGE UP (ref 3.8–10.5)
WBC # FLD AUTO: 6.12 K/UL — SIGNIFICANT CHANGE UP (ref 3.8–10.5)

## 2019-07-08 PROCEDURE — 93010 ELECTROCARDIOGRAM REPORT: CPT

## 2019-07-08 PROCEDURE — 99285 EMERGENCY DEPT VISIT HI MDM: CPT

## 2019-07-08 PROCEDURE — 71045 X-RAY EXAM CHEST 1 VIEW: CPT | Mod: 26

## 2019-07-08 RX ORDER — ACETAMINOPHEN 500 MG
975 TABLET ORAL ONCE
Refills: 0 | Status: COMPLETED | OUTPATIENT
Start: 2019-07-08 | End: 2019-07-08

## 2019-07-08 RX ORDER — HYDRALAZINE HCL 50 MG
10 TABLET ORAL ONCE
Refills: 0 | Status: COMPLETED | OUTPATIENT
Start: 2019-07-08 | End: 2019-07-08

## 2019-07-08 RX ORDER — FAMOTIDINE 10 MG/ML
20 INJECTION INTRAVENOUS ONCE
Refills: 0 | Status: COMPLETED | OUTPATIENT
Start: 2019-07-08 | End: 2019-07-08

## 2019-07-08 RX ORDER — IOHEXOL 300 MG/ML
30 INJECTION, SOLUTION INTRAVENOUS ONCE
Refills: 0 | Status: COMPLETED | OUTPATIENT
Start: 2019-07-08 | End: 2019-07-08

## 2019-07-08 RX ORDER — METOCLOPRAMIDE HCL 10 MG
10 TABLET ORAL ONCE
Refills: 0 | Status: COMPLETED | OUTPATIENT
Start: 2019-07-08 | End: 2019-07-08

## 2019-07-08 RX ADMIN — Medication 10 MILLIGRAM(S): at 22:51

## 2019-07-08 RX ADMIN — Medication 975 MILLIGRAM(S): at 22:49

## 2019-07-08 RX ADMIN — FAMOTIDINE 20 MILLIGRAM(S): 10 INJECTION INTRAVENOUS at 22:52

## 2019-07-08 RX ADMIN — IOHEXOL 30 MILLILITER(S): 300 INJECTION, SOLUTION INTRAVENOUS at 22:49

## 2019-07-08 NOTE — ED ADULT NURSE NOTE - PMH
DM (diabetes mellitus)    HTN (hypertension)    Kidney disease <<----- Click to add NO pertinent Past Medical History

## 2019-07-08 NOTE — ED PROVIDER NOTE - PHYSICAL EXAMINATION
Vitals: HTN at 177/88, otherwise WNL  Gen: AAOx3, NAD, sitting comfortably in stretcher, calm, cooperative, non-toxic   Head: ncat, perrla, eomi b/l  Neck: supple, no lymphadenopathy, no midline deviation  Heart: rrr, no m/r/g  Lungs: CTA b/l, no rales/ronchi/wheezes  Abd: soft, diffusely tender, non-distended, no rebound or guarding  Ext: no clubbing/cyanosis/edema  Neuro: sensation and muscle strength intact b/l, no focal weakness Vitals: HTN at 177/88, otherwise WNL  Gen: AAOx3, NAD, sitting comfortably in stretcher, calm, cooperative, non-toxic   Head: ncat, perrla, eomi b/l  Neck: supple, no lymphadenopathy, no midline deviation  Heart: rrr, no m/r/g  Lungs: CTA b/l, no rales/ronchi/wheezes  Abd: soft, mildly tender in LUQ with palpation, non-distended, no rebound or guarding  Ext: no clubbing/cyanosis/edema, R BKA, L AKA noted, stumps intact  Neuro: sensation and muscle strength intact b/l, no focal weakness

## 2019-07-08 NOTE — ED ADULT NURSE NOTE - OBJECTIVE STATEMENT
pt BIBA from [facility]. Pt a&o x3, pt bilateral leg amputee . Pt c/o of 1 day abd pain LLQ, pt denies N/V/D. KASIA chest wall dialysis port, pt receives dialysis M/W/F. Pt unaware3 of home medications.

## 2019-07-08 NOTE — ED ADULT NURSE NOTE - CHIEF COMPLAINT QUOTE
BIBA,  pt from dialysis.  pt c/o generalized abd pain x 2 hours. denies N/V/D    dialysis treatment completed today,  R-chest wall port.  pt from [facility]

## 2019-07-08 NOTE — ED PROVIDER NOTE - OBJECTIVE STATEMENT
65 yo M with diffuse abd pain, worsened after dialysis started today.  Pt. sent by Dr. Fraser (nephro) from dialysis to ER.  Pt. had mild abd pain before start of dialysis which worsened throughout.  No n/v/d, no other associated complaints or symptoms.  Pt. is otherwise well.  Only 1 hr dialysis completed today.    ROS: negative for fever, cough, headache, chest pain, shortness of breath, nausea, vomiting, diarrhea, rash, paresthesia, and weakness--all other systems reviewed are negative.   PMH: negative; Meds: Denies; SH: Denies smoking/drinking/drug use  Nephro: Dr. Fraser (spoke with him via phone at 10:30 pm, corroborating story); PCP: Dr. Dorman  nephro: Dr. Dumont covering for emergent dialysis for [facility], can be reached at [phone] 65 yo M with diffuse abd pain, worsened after dialysis started today.  Pt. sent by Dr. Fraser (nephro) from dialysis to ER.  Pt. had mild abd pain before start of dialysis which worsened throughout.  No n/v/d, no other associated complaints or symptoms.  Pt. is otherwise well.  Only 1 hr dialysis completed today.    ROS: negative for fever, cough, headache, chest pain, shortness of breath, nausea, vomiting, diarrhea, rash, paresthesia, and weakness--all other systems reviewed are negative.   PMH: HTN, HLD, DM, ESRD dialysis dependent (M, W, TH); Meds: Denies; SH: Denies smoking/drinking/drug use, lives at [facility]  Nephro: Dr. Fraser (spoke with him via phone at 10:30 pm, corroborating story); PCP: Dr. Dorman  nephro: Dr. Tim nuñez for emergent dialysis for [facility], can be reached at [phone] 63 yo M with diffuse abd pain, worsened after dialysis started today.  Pt. sent by Dr. Fraser (nephro) from dialysis to ER.  Pt. had mild abd pain before start of dialysis which worsened throughout.  No n/v/d, no other associated complaints or symptoms.  Pt. is otherwise well.  Only 1 hr dialysis completed today.    ROS: negative for fever, cough, headache, chest pain, shortness of breath, nausea, vomiting, diarrhea, rash, paresthesia, and weakness--all other systems reviewed are negative.   PMH: HTN, HLD, DM, ESRD dialysis dependent (M, W, TH); Meds: Denies; SH: Denies smoking/drinking/drug use, lives at [facility]  Nephro: Dr. Fraser (spoke with him via phone at 10:30 pm, corroborating story); PCP: Dr. Dorman  nephro: Dr. Tim nuñez for emergent dialysis for [facility], can be reached at [phone]

## 2019-07-08 NOTE — ED ADULT TRIAGE NOTE - CHIEF COMPLAINT QUOTE
BIBA,  pt from dialysis.  pt c/o generalized abd pain x 2 hours. denies N/V/D    dialysis treatment completed today,  R-chest wall port.  pt from [facility]

## 2019-07-08 NOTE — ED ADULT NURSE NOTE - NSIMPLEMENTINTERV_GEN_ALL_ED
Implemented All Universal Safety Interventions:  [place] to call system. Call bell, personal items and telephone within reach. Instruct patient to call for assistance. Room bathroom lighting operational. Non-slip footwear when patient is off stretcher. Physically safe environment: no spills, clutter or unnecessary equipment. Stretcher in lowest position, wheels locked, appropriate side rails in place. Implemented All Fall with Harm Risk Interventions:  [place] to call system. Call bell, personal items and telephone within reach. Instruct patient to call for assistance. Room bathroom lighting operational. Non-slip footwear when patient is off stretcher. Physically safe environment: no spills, clutter or unnecessary equipment. Stretcher in lowest position, wheels locked, appropriate side rails in place. Provide visual cue, wrist band, yellow gown, etc. Monitor gait and stability. Monitor for mental status changes and reorient to person, place, and time. Review medications for side effects contributing to fall risk. Reinforce activity limits and safety measures with patient and family. Provide visual clues: red socks.

## 2019-07-08 NOTE — ED PROVIDER NOTE - CARE PLAN
Principal Discharge DX:	Generalized abdominal pain Principal Discharge DX:	Colitis  Secondary Diagnosis:	Proctitis  Secondary Diagnosis:	Colonic thickening  Secondary Diagnosis:	Elevated lipase  Secondary Diagnosis:	Dialysis patient

## 2019-07-08 NOTE — ED PROVIDER NOTE - PROGRESS NOTE DETAILS
pt. comfortable, but will need dialysis in AM, as only 1 hr completed  CT shows colonic wall thickening vs colitis, elevated lipase noted  will admit to Dr. Dorman, as per Dr. Navarro's recommendation  Dr. dorman will be in this AM to see patient, Dr. Jules could not take admission due to large burden of patients; medicine PA will place orders for admit

## 2019-07-09 DIAGNOSIS — N18.6 END STAGE RENAL DISEASE: ICD-10-CM

## 2019-07-09 DIAGNOSIS — K52.9 NONINFECTIVE GASTROENTERITIS AND COLITIS, UNSPECIFIED: ICD-10-CM

## 2019-07-09 DIAGNOSIS — E11.22 TYPE 2 DIABETES MELLITUS WITH DIABETIC CHRONIC KIDNEY DISEASE: ICD-10-CM

## 2019-07-09 DIAGNOSIS — K56.41 FECAL IMPACTION: ICD-10-CM

## 2019-07-09 LAB
APPEARANCE UR: CLEAR — SIGNIFICANT CHANGE UP
BACTERIA # UR AUTO: ABNORMAL
BILIRUB UR-MCNC: NEGATIVE — SIGNIFICANT CHANGE UP
COLOR SPEC: YELLOW — SIGNIFICANT CHANGE UP
DIFF PNL FLD: NEGATIVE — SIGNIFICANT CHANGE UP
GLUCOSE UR QL: NEGATIVE MG/DL — SIGNIFICANT CHANGE UP
KETONES UR-MCNC: NEGATIVE — SIGNIFICANT CHANGE UP
LEUKOCYTE ESTERASE UR-ACNC: NEGATIVE — SIGNIFICANT CHANGE UP
NITRITE UR-MCNC: NEGATIVE — SIGNIFICANT CHANGE UP
PH UR: 5 — SIGNIFICANT CHANGE UP (ref 5–8)
PROT UR-MCNC: 500 MG/DL
SP GR SPEC: 1.01 — SIGNIFICANT CHANGE UP (ref 1.01–1.02)
UROBILINOGEN FLD QL: NEGATIVE MG/DL — SIGNIFICANT CHANGE UP
WBC UR QL: SIGNIFICANT CHANGE UP

## 2019-07-09 PROCEDURE — 74176 CT ABD & PELVIS W/O CONTRAST: CPT | Mod: 26

## 2019-07-09 RX ORDER — TAMSULOSIN HYDROCHLORIDE 0.4 MG/1
0.4 CAPSULE ORAL AT BEDTIME
Refills: 0 | Status: DISCONTINUED | OUTPATIENT
Start: 2019-07-09 | End: 2019-07-17

## 2019-07-09 RX ORDER — OXYCODONE AND ACETAMINOPHEN 5; 325 MG/1; MG/1
1 TABLET ORAL EVERY 4 HOURS
Refills: 0 | Status: DISCONTINUED | OUTPATIENT
Start: 2019-07-09 | End: 2019-07-09

## 2019-07-09 RX ORDER — FUROSEMIDE 40 MG
40 TABLET ORAL
Refills: 0 | Status: DISCONTINUED | OUTPATIENT
Start: 2019-07-09 | End: 2019-07-17

## 2019-07-09 RX ORDER — DOCUSATE SODIUM 100 MG
100 CAPSULE ORAL
Refills: 0 | Status: DISCONTINUED | OUTPATIENT
Start: 2019-07-09 | End: 2019-07-17

## 2019-07-09 RX ORDER — ACETAMINOPHEN 500 MG
650 TABLET ORAL EVERY 6 HOURS
Refills: 0 | Status: DISCONTINUED | OUTPATIENT
Start: 2019-07-09 | End: 2019-07-17

## 2019-07-09 RX ORDER — HEPARIN SODIUM 5000 [USP'U]/ML
5000 INJECTION INTRAVENOUS; SUBCUTANEOUS EVERY 8 HOURS
Refills: 0 | Status: DISCONTINUED | OUTPATIENT
Start: 2019-07-09 | End: 2019-07-17

## 2019-07-09 RX ORDER — SENNA PLUS 8.6 MG/1
2 TABLET ORAL AT BEDTIME
Refills: 0 | Status: DISCONTINUED | OUTPATIENT
Start: 2019-07-09 | End: 2019-07-11

## 2019-07-09 RX ORDER — NIFEDIPINE 30 MG
60 TABLET, EXTENDED RELEASE 24 HR ORAL AT BEDTIME
Refills: 0 | Status: DISCONTINUED | OUTPATIENT
Start: 2019-07-09 | End: 2019-07-17

## 2019-07-09 RX ORDER — ATORVASTATIN CALCIUM 80 MG/1
40 TABLET, FILM COATED ORAL AT BEDTIME
Refills: 0 | Status: DISCONTINUED | OUTPATIENT
Start: 2019-07-09 | End: 2019-07-17

## 2019-07-09 RX ORDER — LABETALOL HCL 100 MG
100 TABLET ORAL
Refills: 0 | Status: DISCONTINUED | OUTPATIENT
Start: 2019-07-09 | End: 2019-07-17

## 2019-07-09 RX ORDER — CALCIUM ACETATE 667 MG
1334 TABLET ORAL
Refills: 0 | Status: DISCONTINUED | OUTPATIENT
Start: 2019-07-09 | End: 2019-07-17

## 2019-07-09 RX ORDER — ASPIRIN/CALCIUM CARB/MAGNESIUM 324 MG
81 TABLET ORAL DAILY
Refills: 0 | Status: DISCONTINUED | OUTPATIENT
Start: 2019-07-09 | End: 2019-07-17

## 2019-07-09 RX ORDER — PREGABALIN 225 MG/1
1000 CAPSULE ORAL DAILY
Refills: 0 | Status: DISCONTINUED | OUTPATIENT
Start: 2019-07-09 | End: 2019-07-17

## 2019-07-09 RX ORDER — POLYETHYLENE GLYCOL 3350 17 G/17G
17 POWDER, FOR SOLUTION ORAL DAILY
Refills: 0 | Status: DISCONTINUED | OUTPATIENT
Start: 2019-07-09 | End: 2019-07-17

## 2019-07-09 RX ADMIN — Medication 100 MILLIGRAM(S): at 19:36

## 2019-07-09 RX ADMIN — HEPARIN SODIUM 5000 UNIT(S): 5000 INJECTION INTRAVENOUS; SUBCUTANEOUS at 13:52

## 2019-07-09 RX ADMIN — TAMSULOSIN HYDROCHLORIDE 0.4 MILLIGRAM(S): 0.4 CAPSULE ORAL at 22:37

## 2019-07-09 RX ADMIN — Medication 81 MILLIGRAM(S): at 13:53

## 2019-07-09 RX ADMIN — Medication 1334 MILLIGRAM(S): at 19:36

## 2019-07-09 RX ADMIN — Medication 60 MILLIGRAM(S): at 22:36

## 2019-07-09 RX ADMIN — Medication 40 MILLIGRAM(S): at 19:36

## 2019-07-09 RX ADMIN — PREGABALIN 1000 MICROGRAM(S): 225 CAPSULE ORAL at 13:52

## 2019-07-09 RX ADMIN — SENNA PLUS 2 TABLET(S): 8.6 TABLET ORAL at 22:36

## 2019-07-09 RX ADMIN — Medication 1 TABLET(S): at 13:53

## 2019-07-09 RX ADMIN — HEPARIN SODIUM 5000 UNIT(S): 5000 INJECTION INTRAVENOUS; SUBCUTANEOUS at 22:37

## 2019-07-09 RX ADMIN — Medication 1334 MILLIGRAM(S): at 13:52

## 2019-07-09 RX ADMIN — ATORVASTATIN CALCIUM 40 MILLIGRAM(S): 80 TABLET, FILM COATED ORAL at 22:36

## 2019-07-09 NOTE — H&P ADULT - NSICDXPASTSURGICALHX_GEN_ALL_CORE_FT
PAST SURGICAL HISTORY:  Amputated left leg above knee    Amputated right leg below knee    H/O peripheral artery bypass left fem to below-knee pop with RSVG    S/P BKA (below knee amputation) unilateral, right

## 2019-07-09 NOTE — H&P ADULT - NSHPLABSRESULTS_GEN_ALL_CORE
12.0   6.12  )-----------( 264      ( 2019 22:45 )             38.4         139  |  100  |  63<H>  ----------------------------<  174<H>  3.2<L>   |  32<H>  |  3.49<H>    Ca    8.5      2019 22:45    TPro  7.3  /  Alb  2.4<L>  /  TBili  0.2  /  DBili  x   /  AST  36  /  ALT  38  /  AlkPhos  138<H>      PT/INR - ( 2019 22:45 )   PT: 10.8 sec;   INR: 0.97 ratio         PTT - ( 2019 22:45 )  PTT:32.7 sec  Urinalysis Basic - ( 2019 00:02 )    Color: Yellow / Appearance: Clear / S.015 / pH: x  Gluc: x / Ketone: Negative  / Bili: Negative / Urobili: Negative mg/dL   Blood: x / Protein: 500 mg/dL / Nitrite: Negative   Leuk Esterase: Negative / RBC: x / WBC 0-2   Sq Epi: x / Non Sq Epi: x / Bacteria: Moderate

## 2019-07-09 NOTE — CONSULT NOTE ADULT - SUBJECTIVE AND OBJECTIVE BOX
HPI:  65 yo M h/o ESRD, DM and HTN presented to the ER with diffuse abd pain, worsened after dialysis started yesterday. Pt denies sob, palpitation and fever. (09 Jul 2019 10:17). Pt denies pain at time of evaluation but admits to chronic constipation. He has no overt bleeding. C-T abd obtained following admission shows fecal retention. Underlying colonic neoplasia could not excluded..       REVIEW OF SYSTEMS unremarkable      General:	    Skin/Breast:  	  Ophthalmologic:  	  ENMT:	    Respiratory and Thorax:  	  Cardiovascular:	    Gastrointestinal:	    Genitourinary:	    Musculoskeletal:	    Neurological:	    Psychiatric:	    Hematology/Lymphatics:	    Endocrine:	    Allergic/Immunologic:	    MEDICATIONS  (STANDING):  aspirin enteric coated 81 milliGRAM(s) Oral daily  atorvastatin 40 milliGRAM(s) Oral at bedtime  calcium acetate 1334 milliGRAM(s) Oral three times a day with meals  cyanocobalamin 1000 MICROGram(s) Oral daily  docusate sodium 100 milliGRAM(s) Oral two times a day  furosemide    Tablet 40 milliGRAM(s) Oral two times a day  heparin  Injectable 5000 Unit(s) SubCutaneous every 8 hours  labetalol 100 milliGRAM(s) Oral two times a day  multivitamin 1 Tablet(s) Oral daily  NIFEdipine XL 60 milliGRAM(s) Oral at bedtime  polyethylene glycol 3350 17 Gram(s) Oral daily  senna 2 Tablet(s) Oral at bedtime  tamsulosin 0.4 milliGRAM(s) Oral at bedtime    MEDICATIONS  (PRN):  acetaminophen   Tablet .. 650 milliGRAM(s) Oral every 6 hours PRN Temp greater or equal to 38C (100.4F), Mild Pain (1 - 3)  oxyCODONE    5 mG/acetaminophen 325 mG 1 Tablet(s) Oral every 4 hours PRN Moderate Pain      Vital Signs Last 24 Hrs  T(C): 35.9 (09 Jul 2019 17:10), Max: 36.7 (08 Jul 2019 23:50)  T(F): 96.7 (09 Jul 2019 17:10), Max: 98.1 (08 Jul 2019 23:50)  HR: 68 (09 Jul 2019 17:10) (68 - 83)  BP: 189/78 (09 Jul 2019 17:10) (154/71 - 189/78)  BP(mean): --  RR: 16 (09 Jul 2019 17:10) (16 - 18)  SpO2: 100% (09 Jul 2019 17:10) (97% - 100%)    PHYSICAL EXAM:      Constitutional:    Eyes: no jaundice    ENMT:    Neck: supple    Breasts:    Back:    Respiratory: normal    Cardiovascular: normal    Gastrointestinal: no tenderness    Genitourinary:    Rectal: no mass    Extremities: no edema    Vascular:    Neurological:    Skin:    Lymph Nodes:    Musculoskeletal:    Psychiatric:            HEALTH ISSUES - PROBLEM Dx:  ESRD on dialysis: ESRD on dialysis  Type 2 diabetes mellitus with chronic kidney disease on chronic dialysis, with long-term current use of insulin: Type 2 diabetes mellitus with chronic kidney disease on chronic dialysis, with long-term current use of insulin  Fecal impaction of colon: Fecal impaction of colon  Colitis: Colitis            Assesment & Plan: Fecal retention r/o colon ca. Advise colonoscopy.
Dr. Navarro  Office [phone]  Cell [phone]  Jane CONNELL  Cell [phone]    HPI:  63 yo M h/o ESRD, DM and HTN presented to the ER with diffuse abd pain, worsened after dialysis started yesterday. Pt denies sob, palpitation and fever. Feels better at present admitted for colitis      Allergies:  No Known Allergies      PAST MEDICAL & SURGICAL HISTORY:  Kidney disease  HTN (hypertension)  DM (diabetes mellitus)  Wound: (chronic wounds to left foot and leg)  MRSA (methicillin resistant Staphylococcus aureus) colonization: (hx/o MRSA growth from wound culture)  CKD (chronic kidney disease)  Below knee amputation status, right  HTN (hypertension)  DM (diabetes mellitus)  Amputated right leg: below knee  Amputated left leg: above knee  H/O peripheral artery bypass: left fem to below-knee pop with RSVG  S/P BKA (below knee amputation) unilateral, right      Home Medications Reviewed    Hospital Medications:   MEDICATIONS  (STANDING):  aspirin enteric coated 81 milliGRAM(s) Oral daily  atorvastatin 40 milliGRAM(s) Oral at bedtime  calcium acetate 1334 milliGRAM(s) Oral three times a day with meals  cyanocobalamin 1000 MICROGram(s) Oral daily  docusate sodium 100 milliGRAM(s) Oral two times a day  furosemide    Tablet 40 milliGRAM(s) Oral two times a day  heparin  Injectable 5000 Unit(s) SubCutaneous every 8 hours  labetalol 100 milliGRAM(s) Oral two times a day  multivitamin 1 Tablet(s) Oral daily  NIFEdipine XL 60 milliGRAM(s) Oral at bedtime  polyethylene glycol 3350 17 Gram(s) Oral daily  senna 2 Tablet(s) Oral at bedtime  tamsulosin 0.4 milliGRAM(s) Oral at bedtime      SOCIAL HISTORY:  Denies ETOh, Smoking,     FAMILY HISTORY:  Family history of diabetes mellitus (Mother)      REVIEW OF SYSTEMS:  CONSTITUTIONAL: No weakness, fevers or chills  EYES/ENT: No visual changes;  No vertigo or throat pain   NECK: No pain or stiffness  RESPIRATORY: No cough, wheezing, hemoptysis; No shortness of breath  CARDIOVASCULAR: No chest pain or palpitations.  GASTROINTESTINAL: as per HPI  GENITOURINARY: No dysuria, frequency, foamy urine, urinary urgency, incontinence or hematuria  NEUROLOGICAL: No numbness or weakness  SKIN: No itching, burning, rashes, or lesions   VASCULAR: No bilateral lower extremity edema.   All other review of systems is negative unless indicated above.    VITALS:  T(F): 97.8 (19 @ 10:55), Max: 98.1 (19 @ 23:50)  HR: 74 (19 @ 10:55)  BP: 165/79 (19 @ 10:55)  RR: 18 (19 @ 10:55)  SpO2: 98% (19 @ 10:55)  Wt(kg): --    Height (cm): 167.64 ( @ 21:07)  Weight (kg): 52.2 ( @ 21:07)  BMI (kg/m2): 18.6 ( @ 21:07)  BSA (m2): 1.58 ( @ :07)    PHYSICAL EXAM:  Constitutional: NAD  HEENT: anicteric sclera, oropharynx clear, MMM  Neck: No JVD  Respiratory: CTAB, no wheezes, rales or rhonchi  Cardiovascular: S1, S2, RRR  Gastrointestinal: BS+, soft, NT/ND  Extremities: left AKA, right BKA  Neurological: no focal deficits  Psychiatric: Normal mood, normal affect  : No CVA tenderness. No cervantes.   Skin: No rashes  Vascular Access: right IJ catheter    LABS:      139  |  100  |  63<H>  ----------------------------<  174<H>  3.2<L>   |  32<H>  |  3.49<H>    Ca    8.5      2019 22:45    TPro  7.3  /  Alb  2.4<L>  /  TBili  0.2  /  DBili      /  AST  36  /  ALT  38  /  AlkPhos  138<H>      Creatinine Trend: 3.49 <--                        12.0   6.12  )-----------( 264      ( 2019 22:45 )             38.4     Urine Studies:  Urinalysis Basic - ( 2019 00:02 )    Color: Yellow / Appearance: Clear / S.015 / pH:   Gluc:  / Ketone: Negative  / Bili: Negative / Urobili: Negative mg/dL   Blood:  / Protein: 500 mg/dL / Nitrite: Negative   Leuk Esterase: Negative / RBC:  / WBC 0-2   Sq Epi:  / Non Sq Epi:  / Bacteria: Moderate          RADIOLOGY & ADDITIONAL STUDIES:
Vascular Attending:    Patient is seen and eval today for creating an AV access Pt has been on HD for 8 months via permacath, NO AV access has been attempted, Pt recently had left AKA, also is s/p right BKA in past.     HPI:  63 yo M h/o ESRD, DM and HTN presented to the ER with diffuse abd pain, worsened after dialysis started yesterday. Pt denies sob, palpitation and fever. (2019 10:17)      PAST MEDICAL & SURGICAL HISTORY:  Kidney disease  HTN (hypertension)  DM (diabetes mellitus)  Wound: (chronic wounds to left foot and leg)  MRSA (methicillin resistant Staphylococcus aureus) colonization: (hx/o MRSA growth from wound culture)  CKD (chronic kidney disease)  Below knee amputation status, right  HTN (hypertension)  DM (diabetes mellitus)  Amputated right leg: below knee  Amputated left leg: above knee  H/O peripheral artery bypass: left fem to below-knee pop with RSVG  S/P BKA (below knee amputation) unilateral, right        MEDICATIONS  (STANDING):  aspirin enteric coated 81 milliGRAM(s) Oral daily  atorvastatin 40 milliGRAM(s) Oral at bedtime  calcium acetate 1334 milliGRAM(s) Oral three times a day with meals  cyanocobalamin 1000 MICROGram(s) Oral daily  docusate sodium 100 milliGRAM(s) Oral two times a day  furosemide    Tablet 40 milliGRAM(s) Oral two times a day  heparin  Injectable 5000 Unit(s) SubCutaneous every 8 hours  labetalol 100 milliGRAM(s) Oral two times a day  multivitamin 1 Tablet(s) Oral daily  NIFEdipine XL 60 milliGRAM(s) Oral at bedtime  polyethylene glycol 3350 17 Gram(s) Oral daily  senna 2 Tablet(s) Oral at bedtime  tamsulosin 0.4 milliGRAM(s) Oral at bedtime    MEDICATIONS  (PRN):  acetaminophen   Tablet .. 650 milliGRAM(s) Oral every 6 hours PRN Temp greater or equal to 38C (100.4F), Mild Pain (1 - 3)  oxyCODONE    5 mG/acetaminophen 325 mG 1 Tablet(s) Oral every 4 hours PRN Moderate Pain      Allergies    No Known Allergies    Intolerances        SOCIAL HISTORY:      Vital Signs Last 24 Hrs  T(C): 36.6 (2019 10:55), Max: 36.7 (2019 23:50)  T(F): 97.8 (2019 10:55), Max: 98.1 (2019 23:50)  HR: 74 (2019 10:55) (71 - 83)  BP: 165/79 (2019 10:55) (154/71 - 177/88)  BP(mean): --  RR: 18 (2019 10:55) (16 - 18)  SpO2: 98% (2019 10:55) (97% - 99%)    P/E:-  Pt is awake and alert, responsive, able to give hisory, abd pain is better, Pt is right handed.  CAROTIDS:- Bilateral carotids with no Bruits. No scars of previous catheterisation.  UPPER EXTREMITIES:- Bilateral radial artery pulses are weak, ?calcified poor visible veins. and no ischemia of the Hands. No edema of the arms.  ABDOMEN:- No pulsatile mass in the abdomen and no ascites no abd tenderness.  LOWER EXTREMITIES:- Bilateral LE with  Left AKA and right BKA    LABS:                        12.0   6.12  )-----------( 264      ( 2019 22:45 )             38.4         139  |  100  |  63<H>  ----------------------------<  174<H>  3.2<L>   |  32<H>  |  3.49<H>    Ca    8.5      2019 22:45    TPro  7.3  /  Alb  2.4<L>  /  TBili  0.2  /  DBili  x   /  AST  36  /  ALT  38  /  AlkPhos  138<H>      PT/INR - ( 2019 22:45 )   PT: 10.8 sec;   INR: 0.97 ratio         PTT - ( 2019 22:45 )  PTT:32.7 sec  Urinalysis Basic - ( 2019 00:02 )    Color: Yellow / Appearance: Clear / S.015 / pH: x  Gluc: x / Ketone: Negative  / Bili: Negative / Urobili: Negative mg/dL   Blood: x / Protein: 500 mg/dL / Nitrite: Negative   Leuk Esterase: Negative / RBC: x / WBC 0-2   Sq Epi: x / Non Sq Epi: x / Bacteria: Moderate        RADIOLOGY & ADDITIONAL STUDIES    Impression and Plan:  will get venous mapping and pt may be candidate for left UE AVF.

## 2019-07-09 NOTE — H&P ADULT - PROBLEM SELECTOR PROBLEM 4
Message  Patient 1 week postop. Subjectively, he is doing well. Home health nurse coming to change his Aquacel dressing tomorrow. He continues in physical therapy. He is wearing GATO hose, denies calf pain. All protocols reviewed. Patient using only tramadol along with the Celebrex and Tylenol for pain relief. Follow-up appointment scheduled.      Signatures   Electronically signed by : SIERRA BOLDEN PA-C; Ramu 15 2018  3:13PM CST     ESRD on dialysis

## 2019-07-09 NOTE — H&P ADULT - NSICDXPASTMEDICALHX_GEN_ALL_CORE_FT
PAST MEDICAL HISTORY:  Below knee amputation status, right     CKD (chronic kidney disease)     DM (diabetes mellitus)     DM (diabetes mellitus)     HTN (hypertension)     HTN (hypertension)     Kidney disease     MRSA (methicillin resistant Staphylococcus aureus) colonization (hx/o MRSA growth from wound culture)    Wound (chronic wounds to left foot and leg)

## 2019-07-09 NOTE — H&P ADULT - HISTORY OF PRESENT ILLNESS
63 yo M h/o ESRD, DM and HTN presented to the ER with diffuse abd pain, worsened after dialysis started yesterday. Pt denies sob, palpitation and fever.

## 2019-07-09 NOTE — CHART NOTE - NSCHARTNOTEFT_GEN_A_CORE
House Medicine PA    Called to place ED bridge orders by attending. Patient is a 65 YO with ESRD on dialysis, HTN, DM presenting with abdominal pain. Patient could not complete dialysis yesterday due to severe abdominal pain and was sent into the ED for further evaluation. CT shows colitis with possible CA. Patient pending nephrology consult for dialysis. Patient may go to the floor.    Nephro: Dr. Fraser; PCP: Dr. Dorman  nephro: Dr. Dumont covering for emergent dialysis for [facility], can be reached at [phone]

## 2019-07-09 NOTE — H&P ADULT - NSHPPHYSICALEXAM_GEN_ALL_CORE
GENERAL: NAD, well-groomed, well-developed  HEAD:  Atraumatic, Normocephalic  EYES: EOMI, PERRLA, conjunctiva and sclera clear  ENMT: No tonsillar erythema, exudates, or enlargement; Moist mucous membranes, Good dentition, No lesions  NECK: Supple, No JVD, Normal thyroid  NERVOUS SYSTEM:  Alert & Oriented X3, Good concentration; Motor Strength 5/5 B/L upper and lower extremities; DTRs 2+ intact and symmetric  CHEST/LUNG: Clear to percussion bilaterally; No rales, rhonchi, wheezing, or rubs  HEART: Regular rate and rhythm; No murmurs, rubs, or gallops  ABDOMEN: Soft, Nontender, Nondistended; Bowel sounds present  EXTREMITIES:  2+ Peripheral Pulses, No clubbing, cyanosis, or edema  LYMPH: No lymphadenopathy notedRECTAL: No masses, prostate normal size and smooth, Guiac negative   BREAST: No palpatble masses, skin no lesions no retractions, no discharages. adenexa no palpable masses noted   GYN: uterus normal size, adenexa no palpable masses, no CMT, no uterine discharge   SKIN: No rashes or lesions GENERAL: NAD, well-groomed, well-developed  HEAD:  Atraumatic, Normocephalic  EYES: EOMI, PERRLA, conjunctiva and sclera clear  ENMT: No tonsillar erythema, exudates, or enlargement; Moist mucous membranes, Good dentition, No lesions  NECK: Supple, No JVD, Normal thyroid  NERVOUS SYSTEM:  Alert & Oriented X3, Good concentration; Motor Strength 5/5 B/L upper and lower extremities; DTRs 2+ intact and symmetric  CHEST/LUNG: Clear to percussion bilaterally; No rales, rhonchi, wheezing, or rubs  HEART: Regular rate and rhythm; No murmurs, rubs, or gallops  ABDOMEN: Soft, Nontender, Nondistended; Bowel sounds present  EXTREMITIES: Rt BKA, left AKA  LYMPH: No lymphadenopathy notedRECTAL: No masses, prostate normal size and smooth, Guiac negative   BREAST: No palpatble masses, skin no lesions no retractions, no discharages. adenexa no palpable masses noted   GYN: uterus normal size, adenexa no palpable masses, no CMT, no uterine discharge   SKIN: No rashes or lesions

## 2019-07-09 NOTE — CONSULT NOTE ADULT - ASSESSMENT
63 yo M h/o ESRD, DM and HTN presented to the ER with diffuse abd pain, worsened after dialysis started yesterday. Pt denies sob, palpitation and fever. Feels better at present admitted for colitis    A/P:  ESRD:  HD MWF  Yesterday only had one hr HD  But no emergent indication for HD today  Next HD tomorrow  Renal diet  Consent in the chart  Vascular Dr. Christie to evaluate for AVF    HTN:  Suboptimal  Resume home meds  Monitor BP    Anemia:  Sec to CKD  at goal  Epo if needed    CKD-MBD:  Check Po4, PTH level

## 2019-07-09 NOTE — H&P ADULT - NSICDXFAMILYHX_GEN_ALL_CORE_FT
FAMILY HISTORY:  Mother  Still living? Unknown  Family history of diabetes mellitus, Age at diagnosis: Age Unknown

## 2019-07-10 LAB
HCV AB S/CO SERPL IA: 0.1 S/CO — SIGNIFICANT CHANGE UP (ref 0–0.99)
HCV AB SERPL-IMP: SIGNIFICANT CHANGE UP

## 2019-07-10 PROCEDURE — 93931 UPPER EXTREMITY STUDY: CPT | Mod: 26

## 2019-07-10 RX ADMIN — ATORVASTATIN CALCIUM 40 MILLIGRAM(S): 80 TABLET, FILM COATED ORAL at 21:37

## 2019-07-10 RX ADMIN — HEPARIN SODIUM 5000 UNIT(S): 5000 INJECTION INTRAVENOUS; SUBCUTANEOUS at 21:37

## 2019-07-10 RX ADMIN — TAMSULOSIN HYDROCHLORIDE 0.4 MILLIGRAM(S): 0.4 CAPSULE ORAL at 21:37

## 2019-07-10 RX ADMIN — Medication 100 MILLIGRAM(S): at 06:24

## 2019-07-10 RX ADMIN — Medication 1334 MILLIGRAM(S): at 17:04

## 2019-07-10 RX ADMIN — Medication 40 MILLIGRAM(S): at 06:24

## 2019-07-10 RX ADMIN — HEPARIN SODIUM 5000 UNIT(S): 5000 INJECTION INTRAVENOUS; SUBCUTANEOUS at 17:04

## 2019-07-10 RX ADMIN — Medication 60 MILLIGRAM(S): at 21:37

## 2019-07-10 RX ADMIN — HEPARIN SODIUM 5000 UNIT(S): 5000 INJECTION INTRAVENOUS; SUBCUTANEOUS at 06:24

## 2019-07-10 RX ADMIN — Medication 40 MILLIGRAM(S): at 17:04

## 2019-07-10 RX ADMIN — SENNA PLUS 2 TABLET(S): 8.6 TABLET ORAL at 21:37

## 2019-07-10 RX ADMIN — Medication 1334 MILLIGRAM(S): at 07:57

## 2019-07-11 ENCOUNTER — TRANSCRIPTION ENCOUNTER (OUTPATIENT)
Age: 64
End: 2019-07-11

## 2019-07-11 LAB
ANION GAP SERPL CALC-SCNC: 7 MMOL/L — SIGNIFICANT CHANGE UP (ref 5–17)
BUN SERPL-MCNC: 58 MG/DL — HIGH (ref 7–23)
CALCIUM SERPL-MCNC: 8.6 MG/DL — SIGNIFICANT CHANGE UP (ref 8.5–10.1)
CHLORIDE SERPL-SCNC: 105 MMOL/L — SIGNIFICANT CHANGE UP (ref 96–108)
CO2 SERPL-SCNC: 29 MMOL/L — SIGNIFICANT CHANGE UP (ref 22–31)
CREAT SERPL-MCNC: 3.33 MG/DL — HIGH (ref 0.5–1.3)
GLUCOSE BLDC GLUCOMTR-MCNC: 69 MG/DL — LOW (ref 70–99)
GLUCOSE BLDC GLUCOMTR-MCNC: 73 MG/DL — SIGNIFICANT CHANGE UP (ref 70–99)
GLUCOSE SERPL-MCNC: 72 MG/DL — SIGNIFICANT CHANGE UP (ref 70–99)
HCT VFR BLD CALC: 38.4 % — LOW (ref 39–50)
HGB BLD-MCNC: 11.9 G/DL — LOW (ref 13–17)
MCHC RBC-ENTMCNC: 30 PG — SIGNIFICANT CHANGE UP (ref 27–34)
MCHC RBC-ENTMCNC: 31 GM/DL — LOW (ref 32–36)
MCV RBC AUTO: 96.7 FL — SIGNIFICANT CHANGE UP (ref 80–100)
NRBC # BLD: 0 /100 WBCS — SIGNIFICANT CHANGE UP (ref 0–0)
PLATELET # BLD AUTO: 208 K/UL — SIGNIFICANT CHANGE UP (ref 150–400)
POTASSIUM SERPL-MCNC: 3.8 MMOL/L — SIGNIFICANT CHANGE UP (ref 3.5–5.3)
POTASSIUM SERPL-SCNC: 3.8 MMOL/L — SIGNIFICANT CHANGE UP (ref 3.5–5.3)
RBC # BLD: 3.97 M/UL — LOW (ref 4.2–5.8)
RBC # FLD: 17.5 % — HIGH (ref 10.3–14.5)
SODIUM SERPL-SCNC: 141 MMOL/L — SIGNIFICANT CHANGE UP (ref 135–145)
WBC # BLD: 4.59 K/UL — SIGNIFICANT CHANGE UP (ref 3.8–10.5)
WBC # FLD AUTO: 4.59 K/UL — SIGNIFICANT CHANGE UP (ref 3.8–10.5)

## 2019-07-11 RX ORDER — SOD SULF/SODIUM/NAHCO3/KCL/PEG
4000 SOLUTION, RECONSTITUTED, ORAL ORAL ONCE
Refills: 0 | Status: COMPLETED | OUTPATIENT
Start: 2019-07-11 | End: 2019-07-11

## 2019-07-11 RX ORDER — SENNA PLUS 8.6 MG/1
2 TABLET ORAL ONCE
Refills: 0 | Status: COMPLETED | OUTPATIENT
Start: 2019-07-11 | End: 2019-07-11

## 2019-07-11 RX ADMIN — Medication 40 MILLIGRAM(S): at 21:03

## 2019-07-11 RX ADMIN — Medication 100 MILLIGRAM(S): at 21:03

## 2019-07-11 RX ADMIN — Medication 1334 MILLIGRAM(S): at 21:04

## 2019-07-11 RX ADMIN — Medication 10 MILLIGRAM(S): at 10:51

## 2019-07-11 RX ADMIN — Medication 40 MILLIGRAM(S): at 06:04

## 2019-07-11 RX ADMIN — HEPARIN SODIUM 5000 UNIT(S): 5000 INJECTION INTRAVENOUS; SUBCUTANEOUS at 06:04

## 2019-07-11 RX ADMIN — Medication 4000 MILLILITER(S): at 10:51

## 2019-07-11 RX ADMIN — ATORVASTATIN CALCIUM 40 MILLIGRAM(S): 80 TABLET, FILM COATED ORAL at 22:37

## 2019-07-11 RX ADMIN — Medication 1334 MILLIGRAM(S): at 08:03

## 2019-07-11 RX ADMIN — Medication 81 MILLIGRAM(S): at 11:13

## 2019-07-11 RX ADMIN — PREGABALIN 1000 MICROGRAM(S): 225 CAPSULE ORAL at 17:02

## 2019-07-11 RX ADMIN — Medication 60 MILLIGRAM(S): at 22:37

## 2019-07-11 RX ADMIN — SENNA PLUS 2 TABLET(S): 8.6 TABLET ORAL at 17:01

## 2019-07-11 RX ADMIN — TAMSULOSIN HYDROCHLORIDE 0.4 MILLIGRAM(S): 0.4 CAPSULE ORAL at 22:37

## 2019-07-11 RX ADMIN — Medication 100 MILLIGRAM(S): at 06:04

## 2019-07-11 RX ADMIN — Medication 1334 MILLIGRAM(S): at 17:01

## 2019-07-11 RX ADMIN — Medication 1 TABLET(S): at 11:13

## 2019-07-11 RX ADMIN — HEPARIN SODIUM 5000 UNIT(S): 5000 INJECTION INTRAVENOUS; SUBCUTANEOUS at 17:02

## 2019-07-11 NOTE — CHART NOTE - NSCHARTNOTEFT_GEN_A_CORE
7-11-19 the pt is seen and examined, the venous duplex is reviewed, Py has clots in Bilateral cephalic veins in the arms and also left forearm cephalic vein,?/acute vs chronic. The veins are not suitable at this time for AVF. Pt also has GI issues with abd pain and the w/u is in progress.  ADV--- NO IV or Blood Draw in the left UE and place pink band on the left wrist, reevaluate in  3to 4 weeks for AVF or may need AVG  pt can come to my office for f/u in 3 weeks [phone]. I have discussed with the patient and Dr Dorman

## 2019-07-12 ENCOUNTER — RESULT REVIEW (OUTPATIENT)
Age: 64
End: 2019-07-12

## 2019-07-12 LAB
GLUCOSE BLDC GLUCOMTR-MCNC: 113 MG/DL — HIGH (ref 70–99)
GLUCOSE BLDC GLUCOMTR-MCNC: 152 MG/DL — HIGH (ref 70–99)
GLUCOSE BLDC GLUCOMTR-MCNC: 63 MG/DL — LOW (ref 70–99)
GLUCOSE BLDC GLUCOMTR-MCNC: 67 MG/DL — LOW (ref 70–99)
GLUCOSE BLDC GLUCOMTR-MCNC: 88 MG/DL — SIGNIFICANT CHANGE UP (ref 70–99)

## 2019-07-12 PROCEDURE — 88305 TISSUE EXAM BY PATHOLOGIST: CPT | Mod: 26

## 2019-07-12 PROCEDURE — 88312 SPECIAL STAINS GROUP 1: CPT | Mod: 26

## 2019-07-12 RX ORDER — DEXTROSE 50 % IN WATER 50 %
25 SYRINGE (ML) INTRAVENOUS ONCE
Refills: 0 | Status: COMPLETED | OUTPATIENT
Start: 2019-07-12 | End: 2019-07-12

## 2019-07-12 RX ORDER — ONDANSETRON 8 MG/1
4 TABLET, FILM COATED ORAL ONCE
Refills: 0 | Status: DISCONTINUED | OUTPATIENT
Start: 2019-07-12 | End: 2019-07-12

## 2019-07-12 RX ORDER — SODIUM CHLORIDE 9 MG/ML
1000 INJECTION INTRAMUSCULAR; INTRAVENOUS; SUBCUTANEOUS
Refills: 0 | Status: DISCONTINUED | OUTPATIENT
Start: 2019-07-12 | End: 2019-07-12

## 2019-07-12 RX ADMIN — ATORVASTATIN CALCIUM 40 MILLIGRAM(S): 80 TABLET, FILM COATED ORAL at 22:57

## 2019-07-12 RX ADMIN — Medication 100 MILLIGRAM(S): at 17:10

## 2019-07-12 RX ADMIN — Medication 1334 MILLIGRAM(S): at 12:40

## 2019-07-12 RX ADMIN — Medication 1334 MILLIGRAM(S): at 17:10

## 2019-07-12 RX ADMIN — Medication 60 MILLIGRAM(S): at 23:01

## 2019-07-12 RX ADMIN — Medication 1 TABLET(S): at 12:40

## 2019-07-12 RX ADMIN — Medication 40 MILLIGRAM(S): at 17:10

## 2019-07-12 RX ADMIN — TAMSULOSIN HYDROCHLORIDE 0.4 MILLIGRAM(S): 0.4 CAPSULE ORAL at 22:56

## 2019-07-12 RX ADMIN — Medication 81 MILLIGRAM(S): at 12:40

## 2019-07-12 RX ADMIN — Medication 25 GRAM(S): at 02:42

## 2019-07-12 RX ADMIN — POLYETHYLENE GLYCOL 3350 17 GRAM(S): 17 POWDER, FOR SOLUTION ORAL at 12:41

## 2019-07-12 RX ADMIN — SODIUM CHLORIDE 75 MILLILITER(S): 9 INJECTION INTRAMUSCULAR; INTRAVENOUS; SUBCUTANEOUS at 09:50

## 2019-07-12 RX ADMIN — PREGABALIN 1000 MICROGRAM(S): 225 CAPSULE ORAL at 12:40

## 2019-07-12 RX ADMIN — HEPARIN SODIUM 5000 UNIT(S): 5000 INJECTION INTRAVENOUS; SUBCUTANEOUS at 23:02

## 2019-07-12 NOTE — DIETITIAN INITIAL EVALUATION ADULT. - OTHER INFO
Pt lives with sister & HHA; both cook & food shop. Pt with +abdominal pain/colitis pending colonoscopy & EGD today. Pt presents with fecal impaction last BM x 6(7/12). Pt lives with sister & HHA; both cook & food shop. Pt with +abdominal pain/colitis; colonoscopy & EGD today. Pt presents with fecal impaction last BM x 6(7/12). Pt lives with sister & HHA; both cook & food shop. Pt with +abdominal pain/colitis; colonoscopy & EGD today. Pt presents with fecal impaction last BM x 6(7/12). Pt likes rice beans, chicken,, cornflakes, milk. Pt with limited diet restriction. Pt manages DM type 2 with Januvia 25mg daily PTA.

## 2019-07-12 NOTE — DIETITIAN INITIAL EVALUATION ADULT. - PERTINENT LABORATORY DATA
07-11 Na 141 mmol/L Glu 72 mg/dL K+ 3.8 mmol/L Cr 3.33 mg/dL<H> BUN 58 mg/dL<H> Phos n/a   Alb 2.4(7/8/19)   PAB n/a   Hgb 11.9 g/dL<L> Hct 38.4 %<L> HgA1C n/a    Glucose, Serum: 72 mg/dL   24Hr FS:152 mg/dL  67 mg/dL  63 mg/dL  73 mg/dL  69 mg/dL

## 2019-07-12 NOTE — CHART NOTE - NSCHARTNOTEFT_GEN_A_CORE
Upon Nutritional Assessment by the Registered Dietitian your patient was determined to meet criteria / has evidence of the following diagnosis/diagnoses:          [ ]  Mild Protein Calorie Malnutrition        [ ]  Moderate Protein Calorie Malnutrition        [x ] Severe Protein Calorie Malnutrition        [ ] Unspecified Protein Calorie Malnutrition        [ ] Underweight / BMI <19        [ ] Morbid Obesity / BMI > 40      Findings as based on:  •  Comprehensive nutrition assessment and consultation  •  Calorie counts (nutrient intake analysis)  •  Food acceptance and intake status from observations by staff  •  Follow up  •  Patient education  •  Intervention secondary to interdisciplinary rounds  •   concerns      Treatment:    The following diet has been recommended:  Adv po diet when medically feasible pending results of colonoscopy & EGD; consider Renal/CCHO with snack/Nepro with carb steady 2 x day (830kcal & 38gm protein)    PROVIDER Section:     By signing this assessment you are acknowledging and agree with the diagnosis/diagnoses assigned by the Registered Dietitian    Comments:

## 2019-07-12 NOTE — DIETITIAN INITIAL EVALUATION ADULT. - DIET TYPE
Adv po diet when medically feasible pending results of colonoscopy & EGD; consider Renal/CCHO with snack/Nepro with carb steady 2 x day (830kcal & 38gm protein) Adv po diet when medically feasible pending results of colonoscopy & EGD; consider Renal/CCHO with snack/Nepro with carb steady 2 x day (850kcal & 38gm protein)

## 2019-07-12 NOTE — DIETITIAN INITIAL EVALUATION ADULT. - ENERGY NEEDS
5'6",  Wt=45.4kg(7/12/19),  Adjusted IBW=51kg+/-10%,   99% IBW,   Adjusted BMI=20.4,  UBW=68kg,  67% UBW 5'6", Rt BKA & Lt AKA,  Wt=45.4kg(7/12/19),  Adjusted IBW=51kg+/-10%,   99% IBW,   Adjusted BMI=20.4,  UBW=68kg,  67% UBW

## 2019-07-12 NOTE — DIETITIAN INITIAL EVALUATION ADULT. - PERTINENT MEDS FT
acetaminophen   Tablet .. PRN  aspirin enteric coated  atorvastatin  calcium acetate  cyanocobalamin  docusate sodium  furosemide    Tablet  heparin  Injectable  labetalol  multivitamin  NIFEdipine XL  oxyCODONE    5 mG/acetaminophen 325 mG PRN  polyethylene glycol 3350  tamsulosin

## 2019-07-12 NOTE — DIETITIAN INITIAL EVALUATION ADULT. - PHYSICAL APPEARANCE
Adjusted BMI=20.4; Lt AKA, Rt BKA; no edema; Nutrition focused physical exam conducted:Subcutaneous fat loss: [Mild ] Orbital fat pads region, [WNL ]Buccal fat region, [severe ]Triceps region,  [severe ]Ribs region.  Muscle wasting: [moderate ]Temples region, [moderate ]Clavicle region, [severe ]Shoulder region, [severe ]Scapula region, [unable ]Interosseous region,  [severe ]thigh region, [amputation ]Calf region/underweight/debilitated

## 2019-07-12 NOTE — DIETITIAN INITIAL EVALUATION ADULT. - ETIOLOGY
Inadequate energy & protein needs & increased energy & protein needs related ot hx CKD on HD & Inadequate energy & protein needs & increased energy & protein needs related ot hx CKD on HD

## 2019-07-13 LAB
GLUCOSE BLDC GLUCOMTR-MCNC: 108 MG/DL — HIGH (ref 70–99)
GLUCOSE BLDC GLUCOMTR-MCNC: 89 MG/DL — SIGNIFICANT CHANGE UP (ref 70–99)
GLUCOSE BLDC GLUCOMTR-MCNC: 91 MG/DL — SIGNIFICANT CHANGE UP (ref 70–99)

## 2019-07-13 RX ADMIN — HEPARIN SODIUM 5000 UNIT(S): 5000 INJECTION INTRAVENOUS; SUBCUTANEOUS at 13:15

## 2019-07-13 RX ADMIN — Medication 1334 MILLIGRAM(S): at 12:21

## 2019-07-13 RX ADMIN — Medication 1334 MILLIGRAM(S): at 08:55

## 2019-07-13 RX ADMIN — Medication 1 TABLET(S): at 12:21

## 2019-07-13 RX ADMIN — PREGABALIN 1000 MICROGRAM(S): 225 CAPSULE ORAL at 12:21

## 2019-07-13 RX ADMIN — Medication 100 MILLIGRAM(S): at 00:43

## 2019-07-13 RX ADMIN — Medication 40 MILLIGRAM(S): at 06:36

## 2019-07-13 RX ADMIN — HEPARIN SODIUM 5000 UNIT(S): 5000 INJECTION INTRAVENOUS; SUBCUTANEOUS at 06:36

## 2019-07-13 RX ADMIN — Medication 40 MILLIGRAM(S): at 17:46

## 2019-07-13 RX ADMIN — Medication 81 MILLIGRAM(S): at 12:21

## 2019-07-13 RX ADMIN — Medication 1334 MILLIGRAM(S): at 17:46

## 2019-07-13 RX ADMIN — Medication 100 MILLIGRAM(S): at 17:46

## 2019-07-14 LAB
GLUCOSE BLDC GLUCOMTR-MCNC: 121 MG/DL — HIGH (ref 70–99)
GLUCOSE BLDC GLUCOMTR-MCNC: 76 MG/DL — SIGNIFICANT CHANGE UP (ref 70–99)
GLUCOSE BLDC GLUCOMTR-MCNC: 78 MG/DL — SIGNIFICANT CHANGE UP (ref 70–99)

## 2019-07-14 RX ORDER — LABETALOL HCL 100 MG
20 TABLET ORAL ONCE
Refills: 0 | Status: DISCONTINUED | OUTPATIENT
Start: 2019-07-14 | End: 2019-07-14

## 2019-07-14 RX ORDER — HYDRALAZINE HCL 50 MG
10 TABLET ORAL ONCE
Refills: 0 | Status: COMPLETED | OUTPATIENT
Start: 2019-07-14 | End: 2019-07-14

## 2019-07-14 RX ADMIN — TAMSULOSIN HYDROCHLORIDE 0.4 MILLIGRAM(S): 0.4 CAPSULE ORAL at 21:30

## 2019-07-14 RX ADMIN — HEPARIN SODIUM 5000 UNIT(S): 5000 INJECTION INTRAVENOUS; SUBCUTANEOUS at 00:14

## 2019-07-14 RX ADMIN — Medication 1334 MILLIGRAM(S): at 07:58

## 2019-07-14 RX ADMIN — Medication 1334 MILLIGRAM(S): at 13:06

## 2019-07-14 RX ADMIN — Medication 81 MILLIGRAM(S): at 11:01

## 2019-07-14 RX ADMIN — Medication 100 MILLIGRAM(S): at 07:10

## 2019-07-14 RX ADMIN — Medication 100 MILLIGRAM(S): at 19:12

## 2019-07-14 RX ADMIN — TAMSULOSIN HYDROCHLORIDE 0.4 MILLIGRAM(S): 0.4 CAPSULE ORAL at 00:11

## 2019-07-14 RX ADMIN — ATORVASTATIN CALCIUM 40 MILLIGRAM(S): 80 TABLET, FILM COATED ORAL at 00:11

## 2019-07-14 RX ADMIN — HEPARIN SODIUM 5000 UNIT(S): 5000 INJECTION INTRAVENOUS; SUBCUTANEOUS at 21:30

## 2019-07-14 RX ADMIN — Medication 1334 MILLIGRAM(S): at 19:12

## 2019-07-14 RX ADMIN — ATORVASTATIN CALCIUM 40 MILLIGRAM(S): 80 TABLET, FILM COATED ORAL at 21:30

## 2019-07-14 RX ADMIN — HEPARIN SODIUM 5000 UNIT(S): 5000 INJECTION INTRAVENOUS; SUBCUTANEOUS at 13:06

## 2019-07-14 RX ADMIN — PREGABALIN 1000 MICROGRAM(S): 225 CAPSULE ORAL at 11:01

## 2019-07-14 RX ADMIN — Medication 60 MILLIGRAM(S): at 00:11

## 2019-07-14 RX ADMIN — POLYETHYLENE GLYCOL 3350 17 GRAM(S): 17 POWDER, FOR SOLUTION ORAL at 11:02

## 2019-07-14 RX ADMIN — Medication 40 MILLIGRAM(S): at 07:10

## 2019-07-14 RX ADMIN — Medication 1 TABLET(S): at 11:01

## 2019-07-14 RX ADMIN — HEPARIN SODIUM 5000 UNIT(S): 5000 INJECTION INTRAVENOUS; SUBCUTANEOUS at 07:10

## 2019-07-14 RX ADMIN — Medication 40 MILLIGRAM(S): at 19:12

## 2019-07-14 RX ADMIN — Medication 60 MILLIGRAM(S): at 23:54

## 2019-07-14 RX ADMIN — Medication 10 MILLIGRAM(S): at 05:59

## 2019-07-15 ENCOUNTER — TRANSCRIPTION ENCOUNTER (OUTPATIENT)
Age: 64
End: 2019-07-15

## 2019-07-15 LAB
ANION GAP SERPL CALC-SCNC: 11 MMOL/L — SIGNIFICANT CHANGE UP (ref 5–17)
BUN SERPL-MCNC: 82 MG/DL — HIGH (ref 7–23)
CALCIUM SERPL-MCNC: 8 MG/DL — LOW (ref 8.5–10.1)
CHLORIDE SERPL-SCNC: 107 MMOL/L — SIGNIFICANT CHANGE UP (ref 96–108)
CO2 SERPL-SCNC: 25 MMOL/L — SIGNIFICANT CHANGE UP (ref 22–31)
CREAT SERPL-MCNC: 4.51 MG/DL — HIGH (ref 0.5–1.3)
GLUCOSE BLDC GLUCOMTR-MCNC: 77 MG/DL — SIGNIFICANT CHANGE UP (ref 70–99)
GLUCOSE BLDC GLUCOMTR-MCNC: 78 MG/DL — SIGNIFICANT CHANGE UP (ref 70–99)
GLUCOSE SERPL-MCNC: 113 MG/DL — HIGH (ref 70–99)
HCT VFR BLD CALC: 34 % — LOW (ref 39–50)
HGB BLD-MCNC: 10.6 G/DL — LOW (ref 13–17)
MCHC RBC-ENTMCNC: 29.8 PG — SIGNIFICANT CHANGE UP (ref 27–34)
MCHC RBC-ENTMCNC: 31.2 GM/DL — LOW (ref 32–36)
MCV RBC AUTO: 95.5 FL — SIGNIFICANT CHANGE UP (ref 80–100)
NRBC # BLD: 0 /100 WBCS — SIGNIFICANT CHANGE UP (ref 0–0)
PLATELET # BLD AUTO: 189 K/UL — SIGNIFICANT CHANGE UP (ref 150–400)
POTASSIUM SERPL-MCNC: 4.2 MMOL/L — SIGNIFICANT CHANGE UP (ref 3.5–5.3)
POTASSIUM SERPL-SCNC: 4.2 MMOL/L — SIGNIFICANT CHANGE UP (ref 3.5–5.3)
RBC # BLD: 3.56 M/UL — LOW (ref 4.2–5.8)
RBC # FLD: 16.6 % — HIGH (ref 10.3–14.5)
SODIUM SERPL-SCNC: 143 MMOL/L — SIGNIFICANT CHANGE UP (ref 135–145)
SURGICAL PATHOLOGY STUDY: SIGNIFICANT CHANGE UP
WBC # BLD: 4.45 K/UL — SIGNIFICANT CHANGE UP (ref 3.8–10.5)
WBC # FLD AUTO: 4.45 K/UL — SIGNIFICANT CHANGE UP (ref 3.8–10.5)

## 2019-07-15 RX ORDER — CIPROFLOXACIN LACTATE 400MG/40ML
250 VIAL (ML) INTRAVENOUS
Refills: 0 | Status: DISCONTINUED | OUTPATIENT
Start: 2019-07-15 | End: 2019-07-15

## 2019-07-15 RX ORDER — CIPROFLOXACIN LACTATE 400MG/40ML
250 VIAL (ML) INTRAVENOUS EVERY 24 HOURS
Refills: 0 | Status: DISCONTINUED | OUTPATIENT
Start: 2019-07-15 | End: 2019-07-17

## 2019-07-15 RX ORDER — CIPROFLOXACIN LACTATE 400MG/40ML
1 VIAL (ML) INTRAVENOUS
Qty: 0 | Refills: 0 | DISCHARGE
Start: 2019-07-15

## 2019-07-15 RX ADMIN — Medication 1334 MILLIGRAM(S): at 14:37

## 2019-07-15 RX ADMIN — POLYETHYLENE GLYCOL 3350 17 GRAM(S): 17 POWDER, FOR SOLUTION ORAL at 14:41

## 2019-07-15 RX ADMIN — HEPARIN SODIUM 5000 UNIT(S): 5000 INJECTION INTRAVENOUS; SUBCUTANEOUS at 22:24

## 2019-07-15 RX ADMIN — Medication 250 MILLIGRAM(S): at 14:37

## 2019-07-15 RX ADMIN — TAMSULOSIN HYDROCHLORIDE 0.4 MILLIGRAM(S): 0.4 CAPSULE ORAL at 22:24

## 2019-07-15 RX ADMIN — PREGABALIN 1000 MICROGRAM(S): 225 CAPSULE ORAL at 14:38

## 2019-07-15 RX ADMIN — Medication 100 MILLIGRAM(S): at 18:01

## 2019-07-15 RX ADMIN — Medication 1334 MILLIGRAM(S): at 18:01

## 2019-07-15 RX ADMIN — Medication 81 MILLIGRAM(S): at 14:41

## 2019-07-15 RX ADMIN — Medication 100 MILLIGRAM(S): at 05:10

## 2019-07-15 RX ADMIN — HEPARIN SODIUM 5000 UNIT(S): 5000 INJECTION INTRAVENOUS; SUBCUTANEOUS at 05:11

## 2019-07-15 RX ADMIN — Medication 40 MILLIGRAM(S): at 05:10

## 2019-07-15 RX ADMIN — Medication 1334 MILLIGRAM(S): at 08:23

## 2019-07-15 RX ADMIN — Medication 1 TABLET(S): at 14:38

## 2019-07-15 RX ADMIN — ATORVASTATIN CALCIUM 40 MILLIGRAM(S): 80 TABLET, FILM COATED ORAL at 22:23

## 2019-07-15 RX ADMIN — Medication 60 MILLIGRAM(S): at 22:25

## 2019-07-15 RX ADMIN — HEPARIN SODIUM 5000 UNIT(S): 5000 INJECTION INTRAVENOUS; SUBCUTANEOUS at 14:41

## 2019-07-15 RX ADMIN — Medication 40 MILLIGRAM(S): at 18:01

## 2019-07-15 NOTE — PHYSICAL THERAPY INITIAL EVALUATION ADULT - PERTINENT HX OF CURRENT PROBLEM, REHAB EVAL
Pt. is a 64 year old male admitted to [facility] secondary to Cholitis procititis,. PMH: R BLAKEA,and L AKA  DM,HTN

## 2019-07-15 NOTE — PHYSICAL THERAPY INITIAL EVALUATION ADULT - TRANSFER TRAINING, PT EVAL
Pt will mod assist perform sit to/from stand transfers without LOB using rolling walker by 2-3 weeks

## 2019-07-15 NOTE — DISCHARGE NOTE PROVIDER - NSDCCPCAREPLAN_GEN_ALL_CORE_FT
PRINCIPAL DISCHARGE DIAGNOSIS  Diagnosis: Colitis  Assessment and Plan of Treatment:       SECONDARY DISCHARGE DIAGNOSES  Diagnosis: Dialysis patient  Assessment and Plan of Treatment:     Diagnosis: Elevated lipase  Assessment and Plan of Treatment:     Diagnosis: Colonic thickening  Assessment and Plan of Treatment:     Diagnosis: Type 2 diabetes mellitus with chronic kidney disease on chronic dialysis, with long-term current use of insulin  Assessment and Plan of Treatment: Type 2 diabetes mellitus with chronic kidney disease on chronic dialysis, with long-term current use of insulin    Diagnosis: ESRD on dialysis  Assessment and Plan of Treatment: ESRD on dialysis    Diagnosis: Fecal impaction of colon  Assessment and Plan of Treatment: Fecal impaction of colon    Diagnosis: Proctitis  Assessment and Plan of Treatment:

## 2019-07-15 NOTE — PHYSICAL THERAPY INITIAL EVALUATION ADULT - BALANCE TRAINING, PT EVAL
Pt will increase static/dynamic sitting balance to WFL to perform all functional mobility without LOB, by 2 weeks

## 2019-07-15 NOTE — PHYSICAL THERAPY INITIAL EVALUATION ADULT - BED MOBILITY TRAINING, PT EVAL
Pt will mod assist perform all aspects of bed mobility to help prevent pressure ulcers, by 2-3 weeks

## 2019-07-15 NOTE — DISCHARGE NOTE PROVIDER - CARE PROVIDER_API CALL
Dillan Dorman (DO)  Internal Medicine  [address] [place] [address]  Phone: [phone]  Fax: [phone]  Follow Up Time:
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
## 2019-07-16 RX ORDER — LACTULOSE 10 G/15ML
10 SOLUTION ORAL
Refills: 0 | Status: COMPLETED | OUTPATIENT
Start: 2019-07-16 | End: 2019-07-17

## 2019-07-16 RX ADMIN — Medication 1334 MILLIGRAM(S): at 08:31

## 2019-07-16 RX ADMIN — Medication 100 MILLIGRAM(S): at 06:56

## 2019-07-16 RX ADMIN — Medication 100 MILLIGRAM(S): at 17:25

## 2019-07-16 RX ADMIN — POLYETHYLENE GLYCOL 3350 17 GRAM(S): 17 POWDER, FOR SOLUTION ORAL at 12:12

## 2019-07-16 RX ADMIN — TAMSULOSIN HYDROCHLORIDE 0.4 MILLIGRAM(S): 0.4 CAPSULE ORAL at 21:48

## 2019-07-16 RX ADMIN — Medication 60 MILLIGRAM(S): at 21:48

## 2019-07-16 RX ADMIN — Medication 100 MILLIGRAM(S): at 06:55

## 2019-07-16 RX ADMIN — Medication 81 MILLIGRAM(S): at 12:11

## 2019-07-16 RX ADMIN — Medication 1334 MILLIGRAM(S): at 17:25

## 2019-07-16 RX ADMIN — Medication 40 MILLIGRAM(S): at 17:25

## 2019-07-16 RX ADMIN — HEPARIN SODIUM 5000 UNIT(S): 5000 INJECTION INTRAVENOUS; SUBCUTANEOUS at 06:58

## 2019-07-16 RX ADMIN — HEPARIN SODIUM 5000 UNIT(S): 5000 INJECTION INTRAVENOUS; SUBCUTANEOUS at 14:23

## 2019-07-16 RX ADMIN — Medication 1334 MILLIGRAM(S): at 12:12

## 2019-07-16 RX ADMIN — LACTULOSE 10 GRAM(S): 10 SOLUTION ORAL at 19:49

## 2019-07-16 RX ADMIN — Medication 250 MILLIGRAM(S): at 14:23

## 2019-07-16 RX ADMIN — HEPARIN SODIUM 5000 UNIT(S): 5000 INJECTION INTRAVENOUS; SUBCUTANEOUS at 21:47

## 2019-07-16 RX ADMIN — PREGABALIN 1000 MICROGRAM(S): 225 CAPSULE ORAL at 12:11

## 2019-07-16 RX ADMIN — ATORVASTATIN CALCIUM 40 MILLIGRAM(S): 80 TABLET, FILM COATED ORAL at 21:48

## 2019-07-16 RX ADMIN — Medication 1 TABLET(S): at 12:11

## 2019-07-16 RX ADMIN — Medication 40 MILLIGRAM(S): at 06:58

## 2019-07-16 NOTE — CHART NOTE - NSCHARTNOTEFT_GEN_A_CORE
Assessment: Pt seen for follow-up & continues chronic severe malnutrition. Pt with ESRD last HD 7/15. Pt with fecal impaction & colitis; noted High fiber recommended via GI note 7/12.     Factors impacting intake: [ ] none [ ] nausea  [ ] vomiting [ ] diarrhea [x ] constipation  [ ]chewing problems [ ] swallowing issues  [ ] other:     Diet Prescription: Diet, Consistent Carbohydrate Renal w/Evening Snack:   DASH/TLC {Sodium & Cholesterol Restricted} (07-12-19 @ 09:17)    Intake:  Po intake % meals. Pt requesting Nepro with carb steady 1-2 x day. Pt tolerating po diet, however pt with fecal impaction & colitis no BM x 4 days since colonoscopy. Noted pt on Colace & Miralax.     Current Weight:  Wt=48.1kg(7/16), Wt=52.2kg(7/8)  % Weight Change  4.1kg wt loss(8%) x 8 days    Physical appearance: No edema    Pertinent Medications: MEDICATIONS  (STANDING):  aspirin enteric coated 81 milliGRAM(s) Oral daily  atorvastatin 40 milliGRAM(s) Oral at bedtime  calcium acetate 1334 milliGRAM(s) Oral three times a day with meals  ciprofloxacin     Tablet 250 milliGRAM(s) Oral every 24 hours  cyanocobalamin 1000 MICROGram(s) Oral daily  docusate sodium 100 milliGRAM(s) Oral two times a day  furosemide    Tablet 40 milliGRAM(s) Oral two times a day  heparin  Injectable 5000 Unit(s) SubCutaneous every 8 hours  labetalol 100 milliGRAM(s) Oral two times a day  multivitamin 1 Tablet(s) Oral daily  NIFEdipine XL 60 milliGRAM(s) Oral at bedtime  polyethylene glycol 3350 17 Gram(s) Oral daily  tamsulosin 0.4 milliGRAM(s) Oral at bedtime    MEDICATIONS  (PRN):  acetaminophen   Tablet .. 650 milliGRAM(s) Oral every 6 hours PRN Temp greater or equal to 38C (100.4F), Mild Pain (1 - 3)  oxyCODONE    5 mG/acetaminophen 325 mG 1 Tablet(s) Oral every 4 hours PRN Moderate Pain    Pertinent Labs: 07-15 Na 143 mmol/L Glu 113 mg/dL<H> K+ 4.2 mmol/L Cr 4.51 mg/dL<H> BUN 82 mg/dL<H> Phos n/a   Alb 2.4(7/8)   PAB n/a   Hgb 10.6 g/dL<L> Hct 34.0 %<L> HgA1C n/a    Glucose, Serum: 113 mg/dL <H>, GFR=15   24Hr FS:77 mg/dL    Skin: intact     Estimated Needs:   [x ] no change since previous assessment (7/12/19)  [ ] recalculated:     Previous Nutrition Diagnosis: Nutrition Diagnostic Terminology #1 Malnutrition...     Malnutrition Severe malnutrition in the context of chronic illness.     Etiology Inadequate energy & protein needs & increased energy & protein needs related ot hx CKD on HD &.     Signs/Symptoms severe fat loss & muscle wasting.     Goal/Expected Outcome Pt to tolerate po diet & consume >75% meals when po diet advanced; met   Pt to tolerate supplements when diet advanced; not applicable    Nutrition Diagnosis is [x ] ongoing  [ ] resolved  [ ] improved  [ ] not applicable       New Nutrition Diagnosis: [x ] not applicable       Interventions:   Recommend  [ ] Continue:  [x ] Change Diet To: CCHO with snack/Renal/High fiber  [x ] Nutrition Supplement: Nepro with carb steady 2 x day(850kcal & 38gm protein)  [ ] Nutrition Support:  [x ] Other:     Monitoring and Evaluation:   [x] PO intake [ x ] Tolerance to diet prescription [ x ] weights [ x ] labs[ x ] follow up per protocol  [ ] other: Assessment: Pt seen for follow-up & continues chronic severe malnutrition. Pt with ESRD last HD 7/15. Pt with fecal impaction & colitis; noted High fiber recommended via GI note 7/12.     Factors impacting intake: [ ] none [ ] nausea  [ ] vomiting [ ] diarrhea [x ] constipation  [ ]chewing problems [ ] swallowing issues  [ ] other:     Diet Prescription: Diet, Consistent Carbohydrate Renal w/Evening Snack:   DASH/TLC {Sodium & Cholesterol Restricted} (07-12-19 @ 09:17)    Intake:  Po intake % meals. Pt requesting Nepro with carb steady 1-2 x day. Pt tolerating po diet, however pt with fecal impaction & colitis no BM x 4 days since colonoscopy. Noted pt on Colace & Miralax.     Current Weight:  Wt=48.1kg(7/16), Wt=52.2kg(7/8)  % Weight Change  4.1kg wt loss(8%) x 8 days possibly due to fluid loss/dialysis    Physical appearance: No edema    Pertinent Medications: MEDICATIONS  (STANDING):  aspirin enteric coated 81 milliGRAM(s) Oral daily  atorvastatin 40 milliGRAM(s) Oral at bedtime  calcium acetate 1334 milliGRAM(s) Oral three times a day with meals  ciprofloxacin     Tablet 250 milliGRAM(s) Oral every 24 hours  cyanocobalamin 1000 MICROGram(s) Oral daily  docusate sodium 100 milliGRAM(s) Oral two times a day  furosemide    Tablet 40 milliGRAM(s) Oral two times a day  heparin  Injectable 5000 Unit(s) SubCutaneous every 8 hours  labetalol 100 milliGRAM(s) Oral two times a day  multivitamin 1 Tablet(s) Oral daily  NIFEdipine XL 60 milliGRAM(s) Oral at bedtime  polyethylene glycol 3350 17 Gram(s) Oral daily  tamsulosin 0.4 milliGRAM(s) Oral at bedtime    MEDICATIONS  (PRN):  acetaminophen   Tablet .. 650 milliGRAM(s) Oral every 6 hours PRN Temp greater or equal to 38C (100.4F), Mild Pain (1 - 3)  oxyCODONE    5 mG/acetaminophen 325 mG 1 Tablet(s) Oral every 4 hours PRN Moderate Pain    Pertinent Labs: 07-15 Na 143 mmol/L Glu 113 mg/dL<H> K+ 4.2 mmol/L Cr 4.51 mg/dL<H> BUN 82 mg/dL<H> Phos n/a   Alb 2.4(7/8)   PAB n/a   Hgb 10.6 g/dL<L> Hct 34.0 %<L> HgA1C n/a    Glucose, Serum: 113 mg/dL <H>, GFR=15   24Hr FS:77 mg/dL    Skin: intact     Estimated Needs:   [x ] no change since previous assessment (7/12/19)  [ ] recalculated:     Previous Nutrition Diagnosis: Nutrition Diagnostic Terminology #1 Malnutrition...     Malnutrition Severe malnutrition in the context of chronic illness.     Etiology Inadequate energy & protein needs & increased energy & protein needs related ot hx CKD on HD &.     Signs/Symptoms severe fat loss & muscle wasting.     Goal/Expected Outcome Pt to tolerate po diet & consume >75% meals when po diet advanced; met   Pt to tolerate supplements when diet advanced; not applicable    Nutrition Diagnosis is [x ] ongoing  [ ] resolved  [ ] improved  [ ] not applicable       New Nutrition Diagnosis: [x ] not applicable       Interventions:   Recommend  [ ] Continue:  [x ] Change Diet To: CCHO with snack/Renal/High fiber  [x ] Nutrition Supplement: Nepro with carb steady 2 x day(850kcal & 38gm protein)  [ ] Nutrition Support:  [x ] Other:     Monitoring and Evaluation:   [x] PO intake [ x ] Tolerance to diet prescription [ x ] weights [ x ] labs[ x ] follow up per protocol  [ ] other: Assessment: Pt seen for follow-up & continues chronic severe malnutrition. Pt with ESRD last HD 7/15. Pt with fecal impaction & colitis; noted High fiber recommended via GI note 7/12.     Factors impacting intake: [ ] none [ ] nausea  [ ] vomiting [ ] diarrhea [x ] constipation  [ ]chewing problems [ ] swallowing issues  [ ] other:     Diet Prescription: Diet, Consistent Carbohydrate Renal w/Evening Snack:   DASH/TLC {Sodium & Cholesterol Restricted} (07-12-19 @ 09:17)    Intake:  Po intake % meals. Pt requesting Nepro with carb steady 1-2 x day. Pt tolerating po diet, however pt with fecal impaction & colitis no BM x 4 days since colonoscopy. Noted pt on Colace & Miralax.     Current Weight:  Wt=48.1kg(7/16), Wt=52.2kg(7/8)  % Weight Change  4.1kg wt loss(8%) x 8 days possibly due to fluid loss/dialysis    Physical appearance: No edema    Pertinent Medications: MEDICATIONS  (STANDING):  aspirin enteric coated 81 milliGRAM(s) Oral daily  atorvastatin 40 milliGRAM(s) Oral at bedtime  calcium acetate 1334 milliGRAM(s) Oral three times a day with meals  ciprofloxacin     Tablet 250 milliGRAM(s) Oral every 24 hours  cyanocobalamin 1000 MICROGram(s) Oral daily  docusate sodium 100 milliGRAM(s) Oral two times a day  furosemide    Tablet 40 milliGRAM(s) Oral two times a day  heparin  Injectable 5000 Unit(s) SubCutaneous every 8 hours  labetalol 100 milliGRAM(s) Oral two times a day  multivitamin 1 Tablet(s) Oral daily  NIFEdipine XL 60 milliGRAM(s) Oral at bedtime  polyethylene glycol 3350 17 Gram(s) Oral daily  tamsulosin 0.4 milliGRAM(s) Oral at bedtime    MEDICATIONS  (PRN):  acetaminophen   Tablet .. 650 milliGRAM(s) Oral every 6 hours PRN Temp greater or equal to 38C (100.4F), Mild Pain (1 - 3)  oxyCODONE    5 mG/acetaminophen 325 mG 1 Tablet(s) Oral every 4 hours PRN Moderate Pain    Pertinent Labs: 07-15 Na 143 mmol/L Glu 113 mg/dL<H> K+ 4.2 mmol/L Cr 4.51 mg/dL<H> BUN 82 mg/dL<H> Phos n/a   Alb 2.4(7/8)   PAB n/a   Hgb 10.6 g/dL<L> Hct 34.0 %<L> HgA1C n/a    Glucose, Serum: 113 mg/dL <H>, GFR=15   24Hr FS:77 mg/dL    Skin: intact     Estimated Needs:   [x ] no change since previous assessment (7/12/19)  [ ] recalculated:     Previous Nutrition Diagnosis: Nutrition Diagnostic Terminology #1 Malnutrition...     Malnutrition Severe malnutrition in the context of chronic illness.     Etiology Inadequate energy & protein needs & increased energy & protein needs related ot hx CKD on HD      Signs/Symptoms severe fat loss & muscle wasting.     Goal/Expected Outcome Pt to tolerate po diet & consume >75% meals when po diet advanced; met   Pt to tolerate supplements when diet advanced; not applicable    Nutrition Diagnosis is [x ] ongoing  [ ] resolved  [ ] improved  [ ] not applicable       New Nutrition Diagnosis: [x ] not applicable       Interventions:   Recommend  [ ] Continue:  [x ] Change Diet To: CCHO with snack/Renal/High fiber  [x ] Nutrition Supplement: Nepro with carb steady 2 x day(850kcal & 38gm protein)  [ ] Nutrition Support:  [x ] Other:     Monitoring and Evaluation:   [x] PO intake [ x ] Tolerance to diet prescription [ x ] weights [ x ] labs[ x ] follow up per protocol  [ ] other:

## 2019-07-17 ENCOUNTER — TRANSCRIPTION ENCOUNTER (OUTPATIENT)
Age: 64
End: 2019-07-17

## 2019-07-17 VITALS
RESPIRATION RATE: 18 BRPM | DIASTOLIC BLOOD PRESSURE: 70 MMHG | OXYGEN SATURATION: 99 % | SYSTOLIC BLOOD PRESSURE: 156 MMHG | HEART RATE: 70 BPM

## 2019-07-17 LAB
GLUCOSE BLDC GLUCOMTR-MCNC: 110 MG/DL — HIGH (ref 70–99)
GLUCOSE BLDC GLUCOMTR-MCNC: 69 MG/DL — LOW (ref 70–99)
GLUCOSE BLDC GLUCOMTR-MCNC: 89 MG/DL — SIGNIFICANT CHANGE UP (ref 70–99)

## 2019-07-17 RX ORDER — HYDRALAZINE HCL 50 MG
5 TABLET ORAL ONCE
Refills: 0 | Status: COMPLETED | OUTPATIENT
Start: 2019-07-17 | End: 2019-07-17

## 2019-07-17 RX ORDER — CIPROFLOXACIN LACTATE 400MG/40ML
1 VIAL (ML) INTRAVENOUS
Qty: 0 | Refills: 0 | DISCHARGE
Start: 2019-07-17

## 2019-07-17 RX ADMIN — Medication 1334 MILLIGRAM(S): at 11:01

## 2019-07-17 RX ADMIN — Medication 40 MILLIGRAM(S): at 18:00

## 2019-07-17 RX ADMIN — Medication 1334 MILLIGRAM(S): at 17:56

## 2019-07-17 RX ADMIN — Medication 1334 MILLIGRAM(S): at 07:55

## 2019-07-17 RX ADMIN — Medication 40 MILLIGRAM(S): at 18:03

## 2019-07-17 RX ADMIN — Medication 250 MILLIGRAM(S): at 18:44

## 2019-07-17 RX ADMIN — Medication 81 MILLIGRAM(S): at 11:00

## 2019-07-17 RX ADMIN — Medication 100 MILLIGRAM(S): at 06:10

## 2019-07-17 RX ADMIN — HEPARIN SODIUM 5000 UNIT(S): 5000 INJECTION INTRAVENOUS; SUBCUTANEOUS at 17:56

## 2019-07-17 RX ADMIN — Medication 5 MILLIGRAM(S): at 00:17

## 2019-07-17 RX ADMIN — Medication 40 MILLIGRAM(S): at 06:10

## 2019-07-17 RX ADMIN — HEPARIN SODIUM 5000 UNIT(S): 5000 INJECTION INTRAVENOUS; SUBCUTANEOUS at 06:10

## 2019-07-17 RX ADMIN — PREGABALIN 1000 MICROGRAM(S): 225 CAPSULE ORAL at 11:00

## 2019-07-17 RX ADMIN — Medication 100 MILLIGRAM(S): at 18:00

## 2019-07-17 RX ADMIN — LACTULOSE 10 GRAM(S): 10 SOLUTION ORAL at 06:11

## 2019-07-17 RX ADMIN — Medication 1 TABLET(S): at 11:00

## 2019-07-17 NOTE — PROGRESS NOTE ADULT - SUBJECTIVE AND OBJECTIVE BOX
HPI:  65 yo M h/o ESRD, DM and HTN presented to the ER with diffuse abd pain, worsened after dialysis started yesterday. Pt denies sob, palpitation and fever. (09 Jul 2019 10:17)    Pt feeling better. No abdominal pain. Denies nausea or vomiting. Feels constipated  REVIEW OF SYSTEMS  unremarkable    General:	    Skin/Breast:  	  Ophthalmologic:  	  ENMT:	    Respiratory and Thorax:  	  Cardiovascular:	    Gastrointestinal:	    Genitourinary:	    Musculoskeletal:	    Neurological:	    Psychiatric:	    Hematology/Lymphatics:	    Endocrine:	    Allergic/Immunologic:	    MEDICATIONS  (STANDING):  aspirin enteric coated 81 milliGRAM(s) Oral daily  atorvastatin 40 milliGRAM(s) Oral at bedtime  calcium acetate 1334 milliGRAM(s) Oral three times a day with meals  cyanocobalamin 1000 MICROGram(s) Oral daily  docusate sodium 100 milliGRAM(s) Oral two times a day  furosemide    Tablet 40 milliGRAM(s) Oral two times a day  heparin  Injectable 5000 Unit(s) SubCutaneous every 8 hours  labetalol 100 milliGRAM(s) Oral two times a day  multivitamin 1 Tablet(s) Oral daily  NIFEdipine XL 60 milliGRAM(s) Oral at bedtime  polyethylene glycol 3350 17 Gram(s) Oral daily  tamsulosin 0.4 milliGRAM(s) Oral at bedtime    MEDICATIONS  (PRN):  acetaminophen   Tablet .. 650 milliGRAM(s) Oral every 6 hours PRN Temp greater or equal to 38C (100.4F), Mild Pain (1 - 3)  oxyCODONE    5 mG/acetaminophen 325 mG 1 Tablet(s) Oral every 4 hours PRN Moderate Pain      Vital Signs Last 24 Hrs  T(C): 36 (14 Jul 2019 11:42), Max: 37 (13 Jul 2019 17:32)  T(F): 96.8 (14 Jul 2019 11:42), Max: 98.6 (13 Jul 2019 17:32)  HR: 60 (14 Jul 2019 11:42) (60 - 80)  BP: 166/70 (14 Jul 2019 11:42) (141/76 - 204/84)  BP(mean): --  RR: 16 (14 Jul 2019 11:42) (16 - 16)  SpO2: 100% (14 Jul 2019 11:42) (97% - 100%)    PHYSICAL EXAM:      Constitutional:    Eyes: no jaundice    ENMT:    Neck: supple    Breasts:    Back:    Respiratory: normal    Cardiovascular: normal    Gastrointestinal: normal    Genitourinary:    Rectal:    Extremities: no mass    Vascular:    Neurological:    Skin:    Lymph Nodes:    Musculoskeletal:    Psychiatric:            HEALTH ISSUES - PROBLEM Dx:  ESRD on dialysis: ESRD on dialysis  Type 2 diabetes mellitus with chronic kidney disease on chronic dialysis, with long-term current use of insulin: Type 2 diabetes mellitus with chronic kidney disease on chronic dialysis, with long-term current use of insulin  Fecal impaction of colon: Fecal impaction of colon  Colitis: Colitis            Assesment & Plan: Constipation. Add laxatives
JAMES PATRICK  64y  Patient is a 64y old  Male who presents with a chief complaint of abdominal pain (2019 19:04)    HPI:  63 yo M h/o ESRD, DM and HTN presented to the ER with diffuse abd pain, worsened after dialysis. he feels better today.    HEALTH ISSUES - PROBLEM Dx:  ESRD on dialysis: ESRD on dialysis  Type 2 diabetes mellitus with chronic kidney disease on chronic dialysis, with long-term current use of insulin: Type 2 diabetes mellitus with chronic kidney disease on chronic dialysis, with long-term current use of insulin  Fecal impaction of colon: Fecal impaction of colon  Colitis: Colitis        MEDICATIONS  (STANDING):  aspirin enteric coated 81 milliGRAM(s) Oral daily  atorvastatin 40 milliGRAM(s) Oral at bedtime  calcium acetate 1334 milliGRAM(s) Oral three times a day with meals  cyanocobalamin 1000 MICROGram(s) Oral daily  docusate sodium 100 milliGRAM(s) Oral two times a day  furosemide    Tablet 40 milliGRAM(s) Oral two times a day  heparin  Injectable 5000 Unit(s) SubCutaneous every 8 hours  labetalol 100 milliGRAM(s) Oral two times a day  multivitamin 1 Tablet(s) Oral daily  NIFEdipine XL 60 milliGRAM(s) Oral at bedtime  polyethylene glycol 3350 17 Gram(s) Oral daily  tamsulosin 0.4 milliGRAM(s) Oral at bedtime    MEDICATIONS  (PRN):  acetaminophen   Tablet .. 650 milliGRAM(s) Oral every 6 hours PRN Temp greater or equal to 38C (100.4F), Mild Pain (1 - 3)  oxyCODONE    5 mG/acetaminophen 325 mG 1 Tablet(s) Oral every 4 hours PRN Moderate Pain    Vital Signs Last 24 Hrs  T(C): 37 (2019 17:32), Max: 37 (2019 17:32)  T(F): 98.6 (2019 17:32), Max: 98.6 (2019 17:32)  HR: 64 (2019 17:32) (62 - 67)  BP: 145/64 (2019 17:32) (117/59 - 194/76)  BP(mean): --  RR: 16 (2019 17:32) (16 - 18)  SpO2: 97% (2019 17:32) (97% - 100%)  Daily     Daily Weight in k.1 (2019 06:06)    PHYSICAL EXAM:  Constitutional:   appears comfortable and not distressed. Not diaphoretic.    Neck:  The thyroid is normal. Trachea is midline. Permacath to right ACW.    Respiratory: The lungs are clear to auscultation. No dullness and expansion is normal.    Cardiovascular: S1 and S2 are normal. No mummurs, rubs or gallops are present.    Gastrointestinal: The abdomen is soft. No tenderness is present. No masses are present. Bowel sounds are normal.    Genitourinary: The bladder is not distended. No CVA tenderness is present.    Extremities: No edema is noted. Bilateral BKA    Neurological: Cognition is normal. Tone, power and sensation are normal.     Skin: No leasions are seen  or palpated.    Psychiatric: Mood is appropriate. No hallucinations or flight of ideas are noted.
Dr. Navarro  Office [phone]  Cell [phone]  Jane CONNELL  Cell [phone]      Patient is a 64y old  Male who presents with a chief complaint of abdominal pain (10 Jul 2019 13:34)      Patient seen and examined at bedside. No chest pain/sob    VITALS:  T(F): 97.6 (07-11-19 @ 11:29), Max: 98.6 (07-10-19 @ 18:19)  HR: 69 (07-11-19 @ 11:29)  BP: 135/69 (07-11-19 @ 11:29)  RR: 17 (07-11-19 @ 11:29)  SpO2: 100% (07-11-19 @ 11:29)  Wt(kg): --    07-10 @ 07:01  -  07-11 @ 07:00  --------------------------------------------------------  IN: 0 mL / OUT: 1250 mL / NET: -1250 mL    07-11 @ 07:01  -  07-11 @ 11:31  --------------------------------------------------------  IN: 0 mL / OUT: 100 mL / NET: -100 mL          PHYSICAL EXAM:  Constitutional: NAD  Neck: No JVD  Respiratory: CTAB, no wheezes, rales or rhonchi  Cardiovascular: S1, S2, RRR  Gastrointestinal: BS+, soft, NT/ND  Extremities: No peripheral edema    Hospital Medications:   MEDICATIONS  (STANDING):  aspirin enteric coated 81 milliGRAM(s) Oral daily  atorvastatin 40 milliGRAM(s) Oral at bedtime  calcium acetate 1334 milliGRAM(s) Oral three times a day with meals  cyanocobalamin 1000 MICROGram(s) Oral daily  docusate sodium 100 milliGRAM(s) Oral two times a day  furosemide    Tablet 40 milliGRAM(s) Oral two times a day  heparin  Injectable 5000 Unit(s) SubCutaneous every 8 hours  labetalol 100 milliGRAM(s) Oral two times a day  multivitamin 1 Tablet(s) Oral daily  NIFEdipine XL 60 milliGRAM(s) Oral at bedtime  polyethylene glycol 3350 17 Gram(s) Oral daily  senna 2 Tablet(s) Oral at bedtime  tamsulosin 0.4 milliGRAM(s) Oral at bedtime      LABS:  07-11    141  |  105  |  58<H>  ----------------------------<  72  3.8   |  29  |  3.33<H>    Ca    8.6      11 Jul 2019 10:40      Creatinine Trend: 3.33 <--, 3.49 <--                                11.9   4.59  )-----------( 208      ( 11 Jul 2019 10:40 )             38.4     Urine Studies:  Urinalysis - [07-09-19 @ 00:02]      Color Yellow / Appearance Clear / SG 1.015 / pH 5.0      Gluc Negative / Ketone Negative  / Bili Negative / Urobili Negative       Blood Negative / Protein 500 / Leuk Est Negative / Nitrite Negative      RBC  / WBC 0-2 / Hyaline  / Gran  / Sq Epi  / Non Sq Epi  / Bacteria Moderate      HbA1c 5.5      [05-19-18 @ 07:00]    HCV 0.10, Nonreact      [07-10-19 @ 10:47]      RADIOLOGY & ADDITIONAL STUDIES:
Dr. Navarro  Office: [phone]  Cell: [phone]  BECKI [place]  Cell: [phone]    HEMODIALYSIS NOTE  --------------------------------------------------------------------------------  Chief Complaint: ESRD/Ongoing hemodialysis requirement    24 hour events/subjective: Seen during dialysis, no chest pain/SOB        PAST HISTORY  --------------------------------------------------------------------------------  No significant changes to PMH, PSH, FHx, SHx, unless otherwise noted    ALLERGIES & MEDICATIONS  --------------------------------------------------------------------------------  Allergies    No Known Allergies    Intolerances      Standing Inpatient Medications  aspirin enteric coated 81 milliGRAM(s) Oral daily  atorvastatin 40 milliGRAM(s) Oral at bedtime  calcium acetate 1334 milliGRAM(s) Oral three times a day with meals  cyanocobalamin 1000 MICROGram(s) Oral daily  docusate sodium 100 milliGRAM(s) Oral two times a day  furosemide    Tablet 40 milliGRAM(s) Oral two times a day  heparin  Injectable 5000 Unit(s) SubCutaneous every 8 hours  labetalol 100 milliGRAM(s) Oral two times a day  multivitamin 1 Tablet(s) Oral daily  NIFEdipine XL 60 milliGRAM(s) Oral at bedtime  polyethylene glycol 3350 17 Gram(s) Oral daily  senna 2 Tablet(s) Oral at bedtime  tamsulosin 0.4 milliGRAM(s) Oral at bedtime    PRN Inpatient Medications  acetaminophen   Tablet .. 650 milliGRAM(s) Oral every 6 hours PRN  oxyCODONE    5 mG/acetaminophen 325 mG 1 Tablet(s) Oral every 4 hours PRN      VITALS/PHYSICAL EXAM  --------------------------------------------------------------------------------  T(C): 36.3 (07-10-19 @ 10:15), Max: 36.7 (07-10-19 @ 05:07)  HR: 62 (07-10-19 @ 10:15) (62 - 68)  BP: 102/57 (07-10-19 @ 10:15) (102/57 - 189/78)  RR: 18 (07-10-19 @ 10:15) (16 - 18)  SpO2: 100% (07-10-19 @ 10:15) (97% - 100%)  Wt(kg): --  Height (cm): 167.64 (07-08-19 @ 21:07)  Weight (kg): 52.2 (07-08-19 @ 21:07)  BMI (kg/m2): 18.6 (07-08-19 @ 21:07)  BSA (m2): 1.58 (07-08-19 @ 21:07)  BFR: 350      07-09-19 @ 07:01  -  07-10-19 @ 07:00  --------------------------------------------------------  IN: 150 mL / OUT: 1000 mL / NET: -850 mL      Physical Exam:  	Gen: NAD, well-appearing  	HEENT: PERRL, supple neck, clear oropharynx  	Pulm: CTA B/L  	CV: RRR, S1S2; no rub  	Abd: +BS, soft, nontender/nondistended  	: No suprapubic tenderness  	UE: Warm, FROM, no clubbing, intact strength; no edema; no asterixis  	LE: Warm, FROM, no clubbing, intact strength; no edema  	Vascular access: right IJ permacath    LABS/STUDIES  --------------------------------------------------------------------------------              12.0   6.12  >-----------<  264      [07-08-19 @ 22:45]              38.4     139  |  100  |  63  ----------------------------<  174      [07-08-19 @ 22:45]  3.2   |  32  |  3.49        Ca     8.5     [07-08-19 @ 22:45]    TPro  7.3  /  Alb  2.4  /  TBili  0.2  /  DBili  x   /  AST  36  /  ALT  38  /  AlkPhos  138  [07-08-19 @ 22:45]    PT/INR: PT 10.8 , INR 0.97       [07-08-19 @ 22:45]  PTT: 32.7       [07-08-19 @ 22:45]    Troponin .032      [07-08-19 @ 22:45]    HbA1c 5.5      [05-19-18 @ 07:00]
HPI:  63 yo M h/o ESRD, DM and HTN presented to the ER with diffuse abd pain, worsened after dialysis started yesterday. Pt denies sob, palpitation and fever. (09 Jul 2019 10:17)  He was scheduled for colonoscopy/ EGD. Procedure deferred to am due to poor prep    REVIEW OF SYSTEMS  unremarkable    General:	    Skin/Breast:  	  Ophthalmologic:  	  ENMT:	    Respiratory and Thorax:  	  Cardiovascular:	    Gastrointestinal:	    Genitourinary:	    Musculoskeletal:	    Neurological:	    Psychiatric:	    Hematology/Lymphatics:	    Endocrine:	    Allergic/Immunologic:	    MEDICATIONS  (STANDING):  aspirin enteric coated 81 milliGRAM(s) Oral daily  atorvastatin 40 milliGRAM(s) Oral at bedtime  calcium acetate 1334 milliGRAM(s) Oral three times a day with meals  cyanocobalamin 1000 MICROGram(s) Oral daily  docusate sodium 100 milliGRAM(s) Oral two times a day  furosemide    Tablet 40 milliGRAM(s) Oral two times a day  heparin  Injectable 5000 Unit(s) SubCutaneous every 8 hours  labetalol 100 milliGRAM(s) Oral two times a day  multivitamin 1 Tablet(s) Oral daily  NIFEdipine XL 60 milliGRAM(s) Oral at bedtime  polyethylene glycol 3350 17 Gram(s) Oral daily  tamsulosin 0.4 milliGRAM(s) Oral at bedtime    MEDICATIONS  (PRN):  acetaminophen   Tablet .. 650 milliGRAM(s) Oral every 6 hours PRN Temp greater or equal to 38C (100.4F), Mild Pain (1 - 3)  oxyCODONE    5 mG/acetaminophen 325 mG 1 Tablet(s) Oral every 4 hours PRN Moderate Pain      Vital Signs Last 24 Hrs  T(C): 36.6 (11 Jul 2019 17:27), Max: 36.6 (10 Jul 2019 23:40)  T(F): 97.8 (11 Jul 2019 17:27), Max: 97.8 (10 Jul 2019 23:40)  HR: 72 (11 Jul 2019 17:27) (61 - 72)  BP: 142/68 (11 Jul 2019 17:27) (133/73 - 165/69)  BP(mean): --  RR: 16 (11 Jul 2019 17:27) (16 - 17)  SpO2: 100% (11 Jul 2019 17:27) (98% - 100%)    PHYSICAL EXAM:      Constitutional:    Eyes: no jaundice    ENMT:    Neck: supple    Breasts:    Back:    Respiratory: normal    Cardiovascular: normal    Gastrointestinal: no active bleeding    Genitourinary:    Rectal:    Extremities: no edema    Vascular:    Neurological:    Skin:    Lymph Nodes:    Musculoskeletal:    Psychiatric:            HEALTH ISSUES - PROBLEM Dx:  ESRD on dialysis: ESRD on dialysis  Type 2 diabetes mellitus with chronic kidney disease on chronic dialysis, with long-term current use of insulin: Type 2 diabetes mellitus with chronic kidney disease on chronic dialysis, with long-term current use of insulin  Fecal impaction of colon: Fecal impaction of colon  Colitis: Colitis            Assesment & Plan: GI bleeding/ abnormal C-T. Colonoscopy/ EGD in am
JAMES PATRICK  64y  Patient is a 64y old  Male who presents with a chief complaint of abdominal pain (15 Jul 2019 11:24)    HPI:    ESRD, DM-2, PAD with BKA. Had HD without incident.  No new complaints offered.    HEALTH ISSUES - PROBLEM Dx:  ESRD on dialysis: ESRD on dialysis  Type 2 diabetes mellitus with chronic kidney disease on chronic dialysis, with long-term current use of insulin: Type 2 diabetes mellitus with chronic kidney disease on chronic dialysis, with long-term current use of insulin  Fecal impaction of colon: Fecal impaction of colon  Colitis: Colitis        MEDICATIONS  (STANDING):  aspirin enteric coated 81 milliGRAM(s) Oral daily  atorvastatin 40 milliGRAM(s) Oral at bedtime  calcium acetate 1334 milliGRAM(s) Oral three times a day with meals  ciprofloxacin     Tablet 250 milliGRAM(s) Oral every 24 hours  cyanocobalamin 1000 MICROGram(s) Oral daily  docusate sodium 100 milliGRAM(s) Oral two times a day  furosemide    Tablet 40 milliGRAM(s) Oral two times a day  heparin  Injectable 5000 Unit(s) SubCutaneous every 8 hours  labetalol 100 milliGRAM(s) Oral two times a day  multivitamin 1 Tablet(s) Oral daily  NIFEdipine XL 60 milliGRAM(s) Oral at bedtime  polyethylene glycol 3350 17 Gram(s) Oral daily  tamsulosin 0.4 milliGRAM(s) Oral at bedtime    MEDICATIONS  (PRN):  acetaminophen   Tablet .. 650 milliGRAM(s) Oral every 6 hours PRN Temp greater or equal to 38C (100.4F), Mild Pain (1 - 3)  oxyCODONE    5 mG/acetaminophen 325 mG 1 Tablet(s) Oral every 4 hours PRN Moderate Pain    Vital Signs Last 24 Hrs  T(C): 36.3 (15 Jul 2019 14:41), Max: 36.5 (15 Jul 2019 04:56)  T(F): 97.3 (15 Jul 2019 14:41), Max: 97.7 (15 Jul 2019 04:56)  HR: 67 (15 Jul 2019 14:41) (61 - 67)  BP: 130/67 (15 Jul 2019 14:41) (112/51 - 153/65)  BP(mean): --  RR: 17 (15 Jul 2019 14:41) (16 - 18)  SpO2: 100% (15 Jul 2019 14:41) (98% - 100%)  Daily     Daily Weight in k.4 (15 Jul 2019 04:56)    PHYSICAL EXAM:  Constitutional:  He appears comfortable and not distressed. Not diaphoretic.    Neck:  The thyroid is normal. Trachea is midline.     Respiratory: The lungs are clear to auscultation. No dullness and expansion is normal.    Cardiovascular: S1 and S2 are normal. No mummurs, rubs or gallops are present.    Gastrointestinal: The abdomen is soft. No tenderness is present. No masses are present. Bowel sounds are normal.    Genitourinary: The bladder is not distended. No CVA tenderness is present.    Extremities: No edema is noted. Bilateral BKA.    Neurological: Cognition is normal. Tone, power and sensation are normal.    Skin: No leasions are seen  or palpated.                            10.6   4.45  )-----------( 189      ( 15 Jul 2019 10:42 )             34.0     07-15    143  |  107  |  82<H>  ----------------------------<  113<H>  4.2   |  25  |  4.51<H>    Ca    8.0<L>      15 Jul 2019 10:42
Patient is a 64y old  Male who presents with a chief complaint of abdominal pain (11 Jul 2019 11:30)      INTERVAL HPI/OVERNIGHT EVENTS:  pt with no new episode  MEDICATIONS  (STANDING):  aspirin enteric coated 81 milliGRAM(s) Oral daily  atorvastatin 40 milliGRAM(s) Oral at bedtime  calcium acetate 1334 milliGRAM(s) Oral three times a day with meals  cyanocobalamin 1000 MICROGram(s) Oral daily  docusate sodium 100 milliGRAM(s) Oral two times a day  furosemide    Tablet 40 milliGRAM(s) Oral two times a day  heparin  Injectable 5000 Unit(s) SubCutaneous every 8 hours  labetalol 100 milliGRAM(s) Oral two times a day  multivitamin 1 Tablet(s) Oral daily  NIFEdipine XL 60 milliGRAM(s) Oral at bedtime  polyethylene glycol 3350 17 Gram(s) Oral daily  senna 2 Tablet(s) Oral at bedtime  tamsulosin 0.4 milliGRAM(s) Oral at bedtime    MEDICATIONS  (PRN):  acetaminophen   Tablet .. 650 milliGRAM(s) Oral every 6 hours PRN Temp greater or equal to 38C (100.4F), Mild Pain (1 - 3)  oxyCODONE    5 mG/acetaminophen 325 mG 1 Tablet(s) Oral every 4 hours PRN Moderate Pain      Allergies    No Known Allergies    Intolerances        REVIEW OF SYSTEMS:  CONSTITUTIONAL: No fever, weight loss, or fatigue  EYES: No eye pain, visual disturbances, or discharge  ENMT:  No difficulty hearing, tinnitus, vertigo; No sinus or throat pain  NECK: No pain or stiffness  BREASTS: No pain, masses, or nipple discharge  RESPIRATORY: No cough, wheezing, chills or hemoptysis; No shortness of breath  CARDIOVASCULAR: No chest pain, palpitations, dizziness, or leg swelling  GASTROINTESTINAL: No abdominal or epigastric pain. No nausea, vomiting, or hematemesis; No diarrhea or constipation. No melena or hematochezia.  GENITOURINARY: No dysuria, frequency, hematuria, or incontinence  NEUROLOGICAL: No headaches, memory loss, loss of strength, numbness, or tremors  SKIN: No itching, burning, rashes, or lesions   LYMPH NODES: No enlarged glands  ENDOCRINE: No heat or cold intolerance; No hair loss  MUSCULOSKELETAL: No joint pain or swelling; No muscle, back, or extremity pain  PSYCHIATRIC: No depression, anxiety, mood swings, or difficulty sleeping  HEME/LYMPH: No easy bruising, or bleeding gums  ALLERGY AND IMMUNOLOGIC: No hives or eczema    Vital Signs Last 24 Hrs  T(C): 36.4 (11 Jul 2019 11:29), Max: 37 (10 Jul 2019 18:19)  T(F): 97.6 (11 Jul 2019 11:29), Max: 98.6 (10 Jul 2019 18:19)  HR: 69 (11 Jul 2019 11:29) (61 - 69)  BP: 135/69 (11 Jul 2019 11:29) (102/56 - 165/69)  BP(mean): --  RR: 17 (11 Jul 2019 11:29) (16 - 18)  SpO2: 100% (11 Jul 2019 11:29) (98% - 100%)    PHYSICAL EXAM:  GENERAL: NAD, well-groomed, well-developed  HEAD:  Atraumatic, Normocephalic  EYES: EOMI, PERRLA, conjunctiva and sclera clear  ENMT: No tonsillar erythema, exudates, or enlargement; Moist mucous membranes, Good dentition, No lesions  NECK: Supple, No JVD, Normal thyroid  NERVOUS SYSTEM:  Alert & Oriented X3, Good concentration; Motor Strength 5/5 B/L upper and lower extremities; DTRs 2+ intact and symmetric  CHEST/LUNG: Clear to percussion bilaterally; No rales, rhonchi, wheezing, or rubs  HEART: Regular rate and rhythm; No murmurs, rubs, or gallops  ABDOMEN: Soft, Nontender, Nondistended; Bowel sounds present  EXTREMITIES:  2+ Peripheral Pulses, No clubbing, cyanosis, or edema  LYMPH: No lymphadenopathy noted  SKIN: No rashes or lesions    LABS:                        11.9   4.59  )-----------( 208      ( 11 Jul 2019 10:40 )             38.4     07-11    141  |  105  |  58<H>  ----------------------------<  72  3.8   |  29  |  3.33<H>    Ca    8.6      11 Jul 2019 10:40          CAPILLARY BLOOD GLUCOSE        CULTURES:    HEMOGLOBIN A1C:    CHOLESTEROL:        RADIOLOGY & ADDITIONAL TESTS:
HPI:  65 yo M h/o ESRD, DM and HTN presented to the ER with diffuse abd pain, worsened after dialysis started yesterday. Pt denies sob, palpitation and fever. (09 Jul 2019 10:17)    No active bleeding. Denies abdominal pain  REVIEW OF SYSTEMS unremarkable      General:	    Skin/Breast:  	  Ophthalmologic:  	  ENMT:	    Respiratory and Thorax:  	  Cardiovascular:	    Gastrointestinal:	    Genitourinary:	    Musculoskeletal:	    Neurological:	    Psychiatric:	    Hematology/Lymphatics:	    Endocrine:	    Allergic/Immunologic:	    MEDICATIONS  (STANDING):  aspirin enteric coated 81 milliGRAM(s) Oral daily  atorvastatin 40 milliGRAM(s) Oral at bedtime  calcium acetate 1334 milliGRAM(s) Oral three times a day with meals  cyanocobalamin 1000 MICROGram(s) Oral daily  docusate sodium 100 milliGRAM(s) Oral two times a day  furosemide    Tablet 40 milliGRAM(s) Oral two times a day  heparin  Injectable 5000 Unit(s) SubCutaneous every 8 hours  labetalol 100 milliGRAM(s) Oral two times a day  multivitamin 1 Tablet(s) Oral daily  NIFEdipine XL 60 milliGRAM(s) Oral at bedtime  polyethylene glycol 3350 17 Gram(s) Oral daily  tamsulosin 0.4 milliGRAM(s) Oral at bedtime    MEDICATIONS  (PRN):  acetaminophen   Tablet .. 650 milliGRAM(s) Oral every 6 hours PRN Temp greater or equal to 38C (100.4F), Mild Pain (1 - 3)  oxyCODONE    5 mG/acetaminophen 325 mG 1 Tablet(s) Oral every 4 hours PRN Moderate Pain      Vital Signs Last 24 Hrs  T(C): 37 (13 Jul 2019 17:32), Max: 37 (13 Jul 2019 17:32)  T(F): 98.6 (13 Jul 2019 17:32), Max: 98.6 (13 Jul 2019 17:32)  HR: 64 (13 Jul 2019 17:32) (62 - 67)  BP: 145/64 (13 Jul 2019 17:32) (117/59 - 194/76)  BP(mean): --  RR: 16 (13 Jul 2019 17:32) (16 - 18)  SpO2: 97% (13 Jul 2019 17:32) (97% - 100%)    PHYSICAL EXAM:      Constitutional:    Eyes: no jaundice    ENMT:    Neck: supple    Breasts:    Back:    Respiratory: normal    Cardiovascular: normal    Gastrointestinal: normal    Genitourinary:    Rectal:    Extremities: no edema    Vascular:    Neurological:    Skin:    Lymph Nodes:    Musculoskeletal:    Psychiatric:            HEALTH ISSUES - PROBLEM Dx:  ESRD on dialysis: ESRD on dialysis  Type 2 diabetes mellitus with chronic kidney disease on chronic dialysis, with long-term current use of insulin: Type 2 diabetes mellitus with chronic kidney disease on chronic dialysis, with long-term current use of insulin  Fecal impaction of colon: Fecal impaction of colon  Colitis: Colitis            Assesment & Plan: Constipation. High fiber diet. Laxatives as indicated
JAMES PATRICK  64y  Patient is a 64y old  Male who presents with a chief complaint of abdominal pain (2019 20:50)    HPI:  Seen and examined. No new com[plaints.  Euvolemic and asymptomatic.    HEALTH ISSUES - PROBLEM Dx:  ESRD on dialysis: ESRD on dialysis  Type 2 diabetes mellitus with chronic kidney disease on chronic dialysis, with long-term current use of insulin: Type 2 diabetes mellitus with chronic kidney disease on chronic dialysis, with long-term current use of insulin  Fecal impaction of colon: Fecal impaction of colon  Colitis: Colitis        MEDICATIONS  (STANDING):  aspirin enteric coated 81 milliGRAM(s) Oral daily  atorvastatin 40 milliGRAM(s) Oral at bedtime  calcium acetate 1334 milliGRAM(s) Oral three times a day with meals  cyanocobalamin 1000 MICROGram(s) Oral daily  docusate sodium 100 milliGRAM(s) Oral two times a day  furosemide    Tablet 40 milliGRAM(s) Oral two times a day  heparin  Injectable 5000 Unit(s) SubCutaneous every 8 hours  labetalol 100 milliGRAM(s) Oral two times a day  multivitamin 1 Tablet(s) Oral daily  NIFEdipine XL 60 milliGRAM(s) Oral at bedtime  polyethylene glycol 3350 17 Gram(s) Oral daily  tamsulosin 0.4 milliGRAM(s) Oral at bedtime    MEDICATIONS  (PRN):  acetaminophen   Tablet .. 650 milliGRAM(s) Oral every 6 hours PRN Temp greater or equal to 38C (100.4F), Mild Pain (1 - 3)  oxyCODONE    5 mG/acetaminophen 325 mG 1 Tablet(s) Oral every 4 hours PRN Moderate Pain    Vital Signs Last 24 Hrs  T(C): 36.2 (2019 17:55), Max: 36.6 (2019 00:02)  T(F): 97.1 (2019 17:55), Max: 97.9 (2019 00:02)  HR: 65 (2019 17:55) (60 - 80)  BP: 135/58 (2019 19:10) (125/62 - 204/84)  BP(mean): --  RR: 16 (2019 17:55) (16 - 16)  SpO2: 98% (2019 17:55) (97% - 100%)  Daily     Daily Weight in k.7 (2019 05:12)    PHYSICAL EXAM:  Constitutional:  He appears comfortable and not distressed. Not diaphoretic.    Neck:  The thyroid is normal. Trachea is midline.     Respiratory: The lungs are clear to auscultation. No dullness and expansion is normal.    Cardiovascular: S1 and S2 are normal. No mummurs, rubs or gallops are present.    Gastrointestinal: The abdomen is soft. No tenderness is present. No masses are present. Bowel sounds are normal.    Genitourinary: The bladder is not distended. No CVA tenderness is present.    Extremities: No edema is noted. Bilateral BKA.    Neurological: Cognition is normal. Tone, power and sensation are normal.
JAMES PATRICK  64y  Patient is a 64y old  Male who presents with a chief complaint of abdominal pain (2019 11:38)    HPI:  65 yo M h/o ESRD, DM and HTN presented to the ER with diffuse abd pain, worsened after dialysis started yesterday. Pt denies sob, palpitation and fever. (2019 10:17)  He feels better today. No new complaints offered.    HEALTH ISSUES - PROBLEM Dx:  ESRD on dialysis: ESRD on dialysis  Type 2 diabetes mellitus with chronic kidney disease on chronic dialysis, with long-term current use of insulin: Type 2 diabetes mellitus with chronic kidney disease on chronic dialysis, with long-term current use of insulin  Fecal impaction of colon: Fecal impaction of colon  Colitis: Colitis        MEDICATIONS  (STANDING):  aspirin enteric coated 81 milliGRAM(s) Oral daily  atorvastatin 40 milliGRAM(s) Oral at bedtime  calcium acetate 1334 milliGRAM(s) Oral three times a day with meals  cyanocobalamin 1000 MICROGram(s) Oral daily  docusate sodium 100 milliGRAM(s) Oral two times a day  furosemide    Tablet 40 milliGRAM(s) Oral two times a day  heparin  Injectable 5000 Unit(s) SubCutaneous every 8 hours  labetalol 100 milliGRAM(s) Oral two times a day  multivitamin 1 Tablet(s) Oral daily  NIFEdipine XL 60 milliGRAM(s) Oral at bedtime  polyethylene glycol 3350 17 Gram(s) Oral daily  tamsulosin 0.4 milliGRAM(s) Oral at bedtime    MEDICATIONS  (PRN):  acetaminophen   Tablet .. 650 milliGRAM(s) Oral every 6 hours PRN Temp greater or equal to 38C (100.4F), Mild Pain (1 - 3)  oxyCODONE    5 mG/acetaminophen 325 mG 1 Tablet(s) Oral every 4 hours PRN Moderate Pain    Vital Signs Last 24 Hrs  T(C): 36.6 (2019 18:08), Max: 36.8 (2019 13:10)  T(F): 97.9 (2019 18:08), Max: 98.3 (2019 13:10)  HR: 68 (2019 18:08) (65 - 72)  BP: 154/75 (2019 18:08) (123/61 - 181/76)  BP(mean): --  RR: 18 (2019 18:08) (13 - 20)  SpO2: 100% (2019 18:08) (98% - 100%)  Daily     Daily Weight in k.5 (2019 16:20)    PHYSICAL EXAM:  Constitutional:  He appears comfortable and not distressed. Not diaphoretic.    Neck:  The thyroid is normal. Trachea is midline.     Breasts: Normal examination.    Respiratory: The lungs are clear to auscultation. No dullness and expansion is normal.    Cardiovascular: S1 and S2 are normal. No mummurs, rubs or gallops are present.    Gastrointestinal: The abdomen is soft. No tenderness is present. No masses are present. Bowel sounds are normal.    Genitourinary: The bladder is not distended. No CVA tenderness is present.    Extremities: No edema is noted. Bilateral BKA    Neurological: Cognition is normal. Tone, power and sensation are normal. Gait is steady.    Skin: No leasions are seen  or palpated.    Lymph Nodes: No lymphadenopathy is present.    Psychiatric: Mood is appropriate. No hallucinations or flight of ideas are noted.                              11.9   4.59  )-----------( 208      ( 2019 10:40 )             38.4     07-11    141  |  105  |  58<H>  ----------------------------<  72  3.8   |  29  |  3.33<H>    Ca    8.6      2019 10:40
Dr. Navarro  Office [phone]  Cell [phone]  Jane CONNELL  Cell [phone]      Patient is a 64y old  Male who presents with a chief complaint of abdominal pain (16 Jul 2019 12:41)      Patient seen and examined at bedside. No chest pain/sob    VITALS:  T(F): 97.7 (07-16-19 @ 11:33), Max: 98.6 (07-16-19 @ 00:25)  HR: 61 (07-16-19 @ 11:33)  BP: 136/64 (07-16-19 @ 11:33)  RR: 17 (07-16-19 @ 11:33)  SpO2: 100% (07-16-19 @ 11:33)  Wt(kg): --    07-15 @ 07:01  -  07-16 @ 07:00  --------------------------------------------------------  IN: 0 mL / OUT: 1500 mL / NET: -1500 mL          PHYSICAL EXAM:  Constitutional: NAD  Neck: No JVD  Respiratory: CTAB, no wheezes, rales or rhonchi  Cardiovascular: S1, S2, RRR  Gastrointestinal: BS+, soft, NT/ND  Extremities: right BKA, left AKA    Hospital Medications:   MEDICATIONS  (STANDING):  aspirin enteric coated 81 milliGRAM(s) Oral daily  atorvastatin 40 milliGRAM(s) Oral at bedtime  calcium acetate 1334 milliGRAM(s) Oral three times a day with meals  ciprofloxacin     Tablet 250 milliGRAM(s) Oral every 24 hours  cyanocobalamin 1000 MICROGram(s) Oral daily  docusate sodium 100 milliGRAM(s) Oral two times a day  furosemide    Tablet 40 milliGRAM(s) Oral two times a day  heparin  Injectable 5000 Unit(s) SubCutaneous every 8 hours  labetalol 100 milliGRAM(s) Oral two times a day  multivitamin 1 Tablet(s) Oral daily  NIFEdipine XL 60 milliGRAM(s) Oral at bedtime  polyethylene glycol 3350 17 Gram(s) Oral daily  tamsulosin 0.4 milliGRAM(s) Oral at bedtime      LABS:  07-15    143  |  107  |  82<H>  ----------------------------<  113<H>  4.2   |  25  |  4.51<H>    Ca    8.0<L>      15 Jul 2019 10:42      Creatinine Trend: 4.51 <--, 3.33 <--                                10.6   4.45  )-----------( 189      ( 15 Jul 2019 10:42 )             34.0     Urine Studies:  Urinalysis - [07-09-19 @ 00:02]      Color Yellow / Appearance Clear / SG 1.015 / pH 5.0      Gluc Negative / Ketone Negative  / Bili Negative / Urobili Negative       Blood Negative / Protein 500 / Leuk Est Negative / Nitrite Negative      RBC  / WBC 0-2 / Hyaline  / Gran  / Sq Epi  / Non Sq Epi  / Bacteria Moderate      HbA1c 5.5      [05-19-18 @ 07:00]    HCV 0.10, Nonreact      [07-10-19 @ 10:47]      RADIOLOGY & ADDITIONAL STUDIES:
Dr. Navarro  Office [phone]  Cell [phone]  Jnae CONNELL  Cell [phone]      Patient is a 64y old  Male who presents with a chief complaint of abdominal pain (16 Jul 2019 12:49)      Patient seen and examined at bedside. No chest pain/sob    VITALS:  T(F): 97.1 (07-17-19 @ 06:08), Max: 98 (07-16-19 @ 11:30)  HR: 64 (07-17-19 @ 06:08)  BP: 130/58 (07-17-19 @ 09:36)  RR: 16 (07-17-19 @ 06:08)  SpO2: 100% (07-17-19 @ 06:08)  Wt(kg): --    07-16 @ 07:01  -  07-17 @ 07:00  --------------------------------------------------------  IN: 450 mL / OUT: 0 mL / NET: 450 mL          PHYSICAL EXAM:  Constitutional: NAD  Neck: No JVD  Respiratory: CTAB, no wheezes, rales or rhonchi  Cardiovascular: S1, S2, RRR  Gastrointestinal: BS+, soft, NT/ND  Extremities: No peripheral edema    Hospital Medications:   MEDICATIONS  (STANDING):  aspirin enteric coated 81 milliGRAM(s) Oral daily  atorvastatin 40 milliGRAM(s) Oral at bedtime  calcium acetate 1334 milliGRAM(s) Oral three times a day with meals  ciprofloxacin     Tablet 250 milliGRAM(s) Oral every 24 hours  cyanocobalamin 1000 MICROGram(s) Oral daily  docusate sodium 100 milliGRAM(s) Oral two times a day  furosemide    Tablet 40 milliGRAM(s) Oral two times a day  heparin  Injectable 5000 Unit(s) SubCutaneous every 8 hours  labetalol 100 milliGRAM(s) Oral two times a day  multivitamin 1 Tablet(s) Oral daily  NIFEdipine XL 60 milliGRAM(s) Oral at bedtime  polyethylene glycol 3350 17 Gram(s) Oral daily  tamsulosin 0.4 milliGRAM(s) Oral at bedtime      LABS:  07-15    143  |  107  |  82<H>  ----------------------------<  113<H>  4.2   |  25  |  4.51<H>    Ca    8.0<L>      15 Jul 2019 10:42      Creatinine Trend: 4.51 <--, 3.33 <--                                10.6   4.45  )-----------( 189      ( 15 Jul 2019 10:42 )             34.0     Urine Studies:  Urinalysis - [07-09-19 @ 00:02]      Color Yellow / Appearance Clear / SG 1.015 / pH 5.0      Gluc Negative / Ketone Negative  / Bili Negative / Urobili Negative       Blood Negative / Protein 500 / Leuk Est Negative / Nitrite Negative      RBC  / WBC 0-2 / Hyaline  / Gran  / Sq Epi  / Non Sq Epi  / Bacteria Moderate      HbA1c 5.5      [05-19-18 @ 07:00]    HCV 0.10, Nonreact      [07-10-19 @ 10:47]      RADIOLOGY & ADDITIONAL STUDIES:
Patient is a 64y old  Male who presents with a chief complaint of abdominal pain (12 Jul 2019 18:55)      INTERVAL HPI/OVERNIGHT EVENTS:  pt is doing better  MEDICATIONS  (STANDING):  aspirin enteric coated 81 milliGRAM(s) Oral daily  atorvastatin 40 milliGRAM(s) Oral at bedtime  calcium acetate 1334 milliGRAM(s) Oral three times a day with meals  cyanocobalamin 1000 MICROGram(s) Oral daily  docusate sodium 100 milliGRAM(s) Oral two times a day  furosemide    Tablet 40 milliGRAM(s) Oral two times a day  heparin  Injectable 5000 Unit(s) SubCutaneous every 8 hours  labetalol 100 milliGRAM(s) Oral two times a day  multivitamin 1 Tablet(s) Oral daily  NIFEdipine XL 60 milliGRAM(s) Oral at bedtime  polyethylene glycol 3350 17 Gram(s) Oral daily  tamsulosin 0.4 milliGRAM(s) Oral at bedtime    MEDICATIONS  (PRN):  acetaminophen   Tablet .. 650 milliGRAM(s) Oral every 6 hours PRN Temp greater or equal to 38C (100.4F), Mild Pain (1 - 3)  oxyCODONE    5 mG/acetaminophen 325 mG 1 Tablet(s) Oral every 4 hours PRN Moderate Pain      Allergies    No Known Allergies    Intolerances        REVIEW OF SYSTEMS:  CONSTITUTIONAL: No fever, weight loss, or fatigue  EYES: No eye pain, visual disturbances, or discharge  ENMT:  No difficulty hearing, tinnitus, vertigo; No sinus or throat pain  NECK: No pain or stiffness  BREASTS: No pain, masses, or nipple discharge  RESPIRATORY: No cough, wheezing, chills or hemoptysis; No shortness of breath  CARDIOVASCULAR: No chest pain, palpitations, dizziness, or leg swelling  GASTROINTESTINAL: No abdominal or epigastric pain. No nausea, vomiting, or hematemesis; No diarrhea or constipation. No melena or hematochezia.  GENITOURINARY: No dysuria, frequency, hematuria, or incontinence  NEUROLOGICAL: No headaches, memory loss, loss of strength, numbness, or tremors  SKIN: No itching, burning, rashes, or lesions   LYMPH NODES: No enlarged glands  ENDOCRINE: No heat or cold intolerance; No hair loss  MUSCULOSKELETAL: No joint pain or swelling; No muscle, back, or extremity pain  PSYCHIATRIC: No depression, anxiety, mood swings, or difficulty sleeping  HEME/LYMPH: No easy bruising, or bleeding gums  ALLERGY AND IMMUNOLOGIC: No hives or eczema    Vital Signs Last 24 Hrs  T(C): 36.6 (13 Jul 2019 11:24), Max: 36.6 (12 Jul 2019 18:08)  T(F): 97.9 (13 Jul 2019 11:24), Max: 97.9 (12 Jul 2019 18:08)  HR: 67 (13 Jul 2019 11:24) (62 - 69)  BP: 138/64 (13 Jul 2019 11:24) (117/59 - 194/76)  BP(mean): --  RR: 18 (13 Jul 2019 11:24) (18 - 20)  SpO2: 100% (13 Jul 2019 11:24) (100% - 100%)    PHYSICAL EXAM:  GENERAL: NAD, well-groomed, well-developed  HEAD:  Atraumatic, Normocephalic  EYES: EOMI, PERRLA, conjunctiva and sclera clear  ENMT: No tonsillar erythema, exudates, or enlargement; Moist mucous membranes, Good dentition, No lesions  NECK: Supple, No JVD, Normal thyroid  NERVOUS SYSTEM:  Alert & Oriented X3, Good concentration; Motor Strength 5/5 B/L upper and lower extremities; DTRs 2+ intact and symmetric  CHEST/LUNG: Clear to percussion bilaterally; No rales, rhonchi, wheezing, or rubs  HEART: Regular rate and rhythm; No murmurs, rubs, or gallops  ABDOMEN: Soft, Nontender, Nondistended; Bowel sounds present  EXTREMITIES:  2+ Peripheral Pulses, No clubbing, cyanosis, or edema  LYMPH: No lymphadenopathy noted  SKIN: No rashes or lesions    LABS:              CAPILLARY BLOOD GLUCOSE      POCT Blood Glucose.: 108 mg/dL (13 Jul 2019 11:02)  POCT Blood Glucose.: 91 mg/dL (13 Jul 2019 07:48)  POCT Blood Glucose.: 113 mg/dL (12 Jul 2019 22:51)  POCT Blood Glucose.: 88 mg/dL (12 Jul 2019 16:24)    CULTURES:    HEMOGLOBIN A1C:    CHOLESTEROL:        RADIOLOGY & ADDITIONAL TESTS:
Patient is a 64y old  Male who presents with a chief complaint of abdominal pain (10 Jul 2019 11:37)      INTERVAL HPI/OVERNIGHT EVENTS:  pt with no Acute distress  MEDICATIONS  (STANDING):  aspirin enteric coated 81 milliGRAM(s) Oral daily  atorvastatin 40 milliGRAM(s) Oral at bedtime  calcium acetate 1334 milliGRAM(s) Oral three times a day with meals  cyanocobalamin 1000 MICROGram(s) Oral daily  docusate sodium 100 milliGRAM(s) Oral two times a day  furosemide    Tablet 40 milliGRAM(s) Oral two times a day  heparin  Injectable 5000 Unit(s) SubCutaneous every 8 hours  labetalol 100 milliGRAM(s) Oral two times a day  multivitamin 1 Tablet(s) Oral daily  NIFEdipine XL 60 milliGRAM(s) Oral at bedtime  polyethylene glycol 3350 17 Gram(s) Oral daily  senna 2 Tablet(s) Oral at bedtime  tamsulosin 0.4 milliGRAM(s) Oral at bedtime    MEDICATIONS  (PRN):  acetaminophen   Tablet .. 650 milliGRAM(s) Oral every 6 hours PRN Temp greater or equal to 38C (100.4F), Mild Pain (1 - 3)  oxyCODONE    5 mG/acetaminophen 325 mG 1 Tablet(s) Oral every 4 hours PRN Moderate Pain      Allergies    No Known Allergies    Intolerances        REVIEW OF SYSTEMS:  CONSTITUTIONAL: No fever, weight loss, or fatigue  EYES: No eye pain, visual disturbances, or discharge  ENMT:  No difficulty hearing, tinnitus, vertigo; No sinus or throat pain  NECK: No pain or stiffness  BREASTS: No pain, masses, or nipple discharge  RESPIRATORY: No cough, wheezing, chills or hemoptysis; No shortness of breath  CARDIOVASCULAR: No chest pain, palpitations, dizziness, or leg swelling  GASTROINTESTINAL: No abdominal or epigastric pain. No nausea, vomiting, or hematemesis; No diarrhea or constipation. No melena or hematochezia.  GENITOURINARY: No dysuria, frequency, hematuria, or incontinence  NEUROLOGICAL: No headaches, memory loss, loss of strength, numbness, or tremors  SKIN: No itching, burning, rashes, or lesions   LYMPH NODES: No enlarged glands  ENDOCRINE: No heat or cold intolerance; No hair loss  MUSCULOSKELETAL: No joint pain or swelling; No muscle, back, or extremity pain  PSYCHIATRIC: No depression, anxiety, mood swings, or difficulty sleeping  HEME/LYMPH: No easy bruising, or bleeding gums  ALLERGY AND IMMUNOLOGIC: No hives or eczema    Vital Signs Last 24 Hrs  T(C): 36.3 (10 Jul 2019 10:15), Max: 36.7 (10 Jul 2019 05:07)  T(F): 97.4 (10 Jul 2019 10:15), Max: 98 (10 Jul 2019 05:07)  HR: 62 (10 Jul 2019 10:15) (62 - 68)  BP: 102/57 (10 Jul 2019 10:15) (102/57 - 189/78)  BP(mean): --  RR: 18 (10 Jul 2019 10:15) (16 - 18)  SpO2: 100% (10 Jul 2019 10:15) (97% - 100%)    PHYSICAL EXAM:  GENERAL: NAD, well-groomed, well-developed  HEAD:  Atraumatic, Normocephalic  EYES: EOMI, PERRLA, conjunctiva and sclera clear  ENMT: No tonsillar erythema, exudates, or enlargement; Moist mucous membranes, Good dentition, No lesions  NECK: Supple, No JVD, Normal thyroid  NERVOUS SYSTEM:  Alert & Oriented X3, Good concentration; Motor Strength 5/5 B/L upper and lower extremities; DTRs 2+ intact and symmetric  CHEST/LUNG: Clear to percussion bilaterally; No rales, rhonchi, wheezing, or rubs  HEART: Regular rate and rhythm; No murmurs, rubs, or gallops  ABDOMEN: Soft, Nontender, Nondistended; Bowel sounds present  EXTREMITIES:  2+ Peripheral Pulses, No clubbing, cyanosis, or edema  LYMPH: No lymphadenopathy noted  SKIN: No rashes or lesions    LABS:                        12.0   6.12  )-----------( 264      ( 2019 22:45 )             38.4     07-    139  |  100  |  63<H>  ----------------------------<  174<H>  3.2<L>   |  32<H>  |  3.49<H>    Ca    8.5      2019 22:45    TPro  7.3  /  Alb  2.4<L>  /  TBili  0.2  /  DBili  x   /  AST  36  /  ALT  38  /  AlkPhos  138<H>  07-08    PT/INR - ( 2019 22:45 )   PT: 10.8 sec;   INR: 0.97 ratio         PTT - ( 2019 22:45 )  PTT:32.7 sec  Urinalysis Basic - ( 2019 00:02 )    Color: Yellow / Appearance: Clear / S.015 / pH: x  Gluc: x / Ketone: Negative  / Bili: Negative / Urobili: Negative mg/dL   Blood: x / Protein: 500 mg/dL / Nitrite: Negative   Leuk Esterase: Negative / RBC: x / WBC 0-2   Sq Epi: x / Non Sq Epi: x / Bacteria: Moderate      CAPILLARY BLOOD GLUCOSE        CULTURES:    HEMOGLOBIN A1C:    CHOLESTEROL:        RADIOLOGY & ADDITIONAL TESTS:
Patient is a 64y old  Male who presents with a chief complaint of abdominal pain (15 Jul 2019 15:39)      INTERVAL HPI/OVERNIGHT EVENTS:  pt with no complaint  MEDICATIONS  (STANDING):  aspirin enteric coated 81 milliGRAM(s) Oral daily  atorvastatin 40 milliGRAM(s) Oral at bedtime  calcium acetate 1334 milliGRAM(s) Oral three times a day with meals  ciprofloxacin     Tablet 250 milliGRAM(s) Oral every 24 hours  cyanocobalamin 1000 MICROGram(s) Oral daily  docusate sodium 100 milliGRAM(s) Oral two times a day  furosemide    Tablet 40 milliGRAM(s) Oral two times a day  heparin  Injectable 5000 Unit(s) SubCutaneous every 8 hours  labetalol 100 milliGRAM(s) Oral two times a day  multivitamin 1 Tablet(s) Oral daily  NIFEdipine XL 60 milliGRAM(s) Oral at bedtime  polyethylene glycol 3350 17 Gram(s) Oral daily  tamsulosin 0.4 milliGRAM(s) Oral at bedtime    MEDICATIONS  (PRN):  acetaminophen   Tablet .. 650 milliGRAM(s) Oral every 6 hours PRN Temp greater or equal to 38C (100.4F), Mild Pain (1 - 3)  oxyCODONE    5 mG/acetaminophen 325 mG 1 Tablet(s) Oral every 4 hours PRN Moderate Pain      Allergies    No Known Allergies    Intolerances        REVIEW OF SYSTEMS:  CONSTITUTIONAL: No fever, weight loss, or fatigue  EYES: No eye pain, visual disturbances, or discharge  ENMT:  No difficulty hearing, tinnitus, vertigo; No sinus or throat pain  NECK: No pain or stiffness  BREASTS: No pain, masses, or nipple discharge  RESPIRATORY: No cough, wheezing, chills or hemoptysis; No shortness of breath  CARDIOVASCULAR: No chest pain, palpitations, dizziness, or leg swelling  GASTROINTESTINAL: No abdominal or epigastric pain. No nausea, vomiting, or hematemesis; No diarrhea or constipation. No melena or hematochezia.  GENITOURINARY: No dysuria, frequency, hematuria, or incontinence  NEUROLOGICAL: No headaches, memory loss, loss of strength, numbness, or tremors  SKIN: No itching, burning, rashes, or lesions   LYMPH NODES: No enlarged glands  ENDOCRINE: No heat or cold intolerance; No hair loss  MUSCULOSKELETAL: No joint pain or swelling; No muscle, back, or extremity pain  PSYCHIATRIC: No depression, anxiety, mood swings, or difficulty sleeping  HEME/LYMPH: No easy bruising, or bleeding gums  ALLERGY AND IMMUNOLOGIC: No hives or eczema    Vital Signs Last 24 Hrs  T(C): 36.5 (16 Jul 2019 11:33), Max: 37 (16 Jul 2019 00:25)  T(F): 97.7 (16 Jul 2019 11:33), Max: 98.6 (16 Jul 2019 00:25)  HR: 61 (16 Jul 2019 11:33) (61 - 88)  BP: 136/64 (16 Jul 2019 11:33) (120/65 - 166/70)  BP(mean): --  RR: 17 (16 Jul 2019 11:33) (16 - 18)  SpO2: 100% (16 Jul 2019 11:33) (98% - 100%)    PHYSICAL EXAM:  GENERAL: NAD, well-groomed, well-developed  HEAD:  Atraumatic, Normocephalic  EYES: EOMI, PERRLA, conjunctiva and sclera clear  ENMT: No tonsillar erythema, exudates, or enlargement; Moist mucous membranes, Good dentition, No lesions  NECK: Supple, No JVD, Normal thyroid  NERVOUS SYSTEM:  Alert & Oriented X3, Good concentration; Motor Strength 5/5 B/L upper and lower extremities; DTRs 2+ intact and symmetric  CHEST/LUNG: Clear to percussion bilaterally; No rales, rhonchi, wheezing, or rubs  HEART: Regular rate and rhythm; No murmurs, rubs, or gallops  ABDOMEN: Soft, Nontender, Nondistended; Bowel sounds present  EXTREMITIES:  2+ Peripheral Pulses, No clubbing, cyanosis, or edema  LYMPH: No lymphadenopathy noted  SKIN: No rashes or lesions    LABS:                        10.6   4.45  )-----------( 189      ( 15 Jul 2019 10:42 )             34.0     07-15    143  |  107  |  82<H>  ----------------------------<  113<H>  4.2   |  25  |  4.51<H>    Ca    8.0<L>      15 Jul 2019 10:42          CAPILLARY BLOOD GLUCOSE      POCT Blood Glucose.: 77 mg/dL (15 Jul 2019 13:25)    CULTURES:    HEMOGLOBIN A1C:    CHOLESTEROL:        RADIOLOGY & ADDITIONAL TESTS:
Patient is a 64y old  Male who presents with a chief complaint of abdominal pain (14 Jul 2019 15:04)      INTERVAL HPI/OVERNIGHT EVENTS:  pt is doing better  MEDICATIONS  (STANDING):  aspirin enteric coated 81 milliGRAM(s) Oral daily  atorvastatin 40 milliGRAM(s) Oral at bedtime  calcium acetate 1334 milliGRAM(s) Oral three times a day with meals  cyanocobalamin 1000 MICROGram(s) Oral daily  docusate sodium 100 milliGRAM(s) Oral two times a day  furosemide    Tablet 40 milliGRAM(s) Oral two times a day  heparin  Injectable 5000 Unit(s) SubCutaneous every 8 hours  labetalol 100 milliGRAM(s) Oral two times a day  multivitamin 1 Tablet(s) Oral daily  NIFEdipine XL 60 milliGRAM(s) Oral at bedtime  polyethylene glycol 3350 17 Gram(s) Oral daily  tamsulosin 0.4 milliGRAM(s) Oral at bedtime    MEDICATIONS  (PRN):  acetaminophen   Tablet .. 650 milliGRAM(s) Oral every 6 hours PRN Temp greater or equal to 38C (100.4F), Mild Pain (1 - 3)  oxyCODONE    5 mG/acetaminophen 325 mG 1 Tablet(s) Oral every 4 hours PRN Moderate Pain      Allergies    No Known Allergies    Intolerances        REVIEW OF SYSTEMS:  CONSTITUTIONAL: No fever, weight loss, or fatigue  EYES: No eye pain, visual disturbances, or discharge  ENMT:  No difficulty hearing, tinnitus, vertigo; No sinus or throat pain  NECK: No pain or stiffness  BREASTS: No pain, masses, or nipple discharge  RESPIRATORY: No cough, wheezing, chills or hemoptysis; No shortness of breath  CARDIOVASCULAR: No chest pain, palpitations, dizziness, or leg swelling  GASTROINTESTINAL: No abdominal or epigastric pain. No nausea, vomiting, or hematemesis; No diarrhea or constipation. No melena or hematochezia.  GENITOURINARY: No dysuria, frequency, hematuria, or incontinence  NEUROLOGICAL: No headaches, memory loss, loss of strength, numbness, or tremors  SKIN: No itching, burning, rashes, or lesions   LYMPH NODES: No enlarged glands  ENDOCRINE: No heat or cold intolerance; No hair loss  MUSCULOSKELETAL: No joint pain or swelling; No muscle, back, or extremity pain  PSYCHIATRIC: No depression, anxiety, mood swings, or difficulty sleeping  HEME/LYMPH: No easy bruising, or bleeding gums  ALLERGY AND IMMUNOLOGIC: No hives or eczema    Vital Signs Last 24 Hrs  T(C): 36.2 (14 Jul 2019 17:55), Max: 36.6 (14 Jul 2019 00:02)  T(F): 97.1 (14 Jul 2019 17:55), Max: 97.9 (14 Jul 2019 00:02)  HR: 65 (14 Jul 2019 17:55) (60 - 80)  BP: 135/58 (14 Jul 2019 19:10) (125/62 - 204/84)  BP(mean): --  RR: 16 (14 Jul 2019 17:55) (16 - 16)  SpO2: 98% (14 Jul 2019 17:55) (97% - 100%)    PHYSICAL EXAM:  GENERAL: NAD, well-groomed, well-developed  HEAD:  Atraumatic, Normocephalic  EYES: EOMI, PERRLA, conjunctiva and sclera clear  ENMT: No tonsillar erythema, exudates, or enlargement; Moist mucous membranes, Good dentition, No lesions  NECK: Supple, No JVD, Normal thyroid  NERVOUS SYSTEM:  Alert & Oriented X3, Good concentration; Motor Strength 5/5 B/L upper and lower extremities; DTRs 2+ intact and symmetric  CHEST/LUNG: Clear to percussion bilaterally; No rales, rhonchi, wheezing, or rubs  HEART: Regular rate and rhythm; No murmurs, rubs, or gallops  ABDOMEN: Soft, Nontender, Nondistended; Bowel sounds present  EXTREMITIES:  2+ Peripheral Pulses, No clubbing, cyanosis, or edema  LYMPH: No lymphadenopathy noted  SKIN: No rashes or lesions    LABS:              CAPILLARY BLOOD GLUCOSE      POCT Blood Glucose.: 76 mg/dL (14 Jul 2019 16:23)  POCT Blood Glucose.: 121 mg/dL (14 Jul 2019 11:31)  POCT Blood Glucose.: 78 mg/dL (14 Jul 2019 07:32)    CULTURES:    HEMOGLOBIN A1C:    CHOLESTEROL:        RADIOLOGY & ADDITIONAL TESTS:
Patient is a 64y old  Male who presents with a chief complaint of abdominal pain (11 Jul 2019 23:12)      INTERVAL HPI/OVERNIGHT EVENTS:  no new episode now  MEDICATIONS  (STANDING):  aspirin enteric coated 81 milliGRAM(s) Oral daily  atorvastatin 40 milliGRAM(s) Oral at bedtime  calcium acetate 1334 milliGRAM(s) Oral three times a day with meals  cyanocobalamin 1000 MICROGram(s) Oral daily  docusate sodium 100 milliGRAM(s) Oral two times a day  furosemide    Tablet 40 milliGRAM(s) Oral two times a day  heparin  Injectable 5000 Unit(s) SubCutaneous every 8 hours  labetalol 100 milliGRAM(s) Oral two times a day  multivitamin 1 Tablet(s) Oral daily  NIFEdipine XL 60 milliGRAM(s) Oral at bedtime  polyethylene glycol 3350 17 Gram(s) Oral daily  tamsulosin 0.4 milliGRAM(s) Oral at bedtime    MEDICATIONS  (PRN):  acetaminophen   Tablet .. 650 milliGRAM(s) Oral every 6 hours PRN Temp greater or equal to 38C (100.4F), Mild Pain (1 - 3)  oxyCODONE    5 mG/acetaminophen 325 mG 1 Tablet(s) Oral every 4 hours PRN Moderate Pain      Allergies    No Known Allergies    Intolerances        REVIEW OF SYSTEMS:  CONSTITUTIONAL: No fever, weight loss, or fatigue  EYES: No eye pain, visual disturbances, or discharge  ENMT:  No difficulty hearing, tinnitus, vertigo; No sinus or throat pain  NECK: No pain or stiffness  BREASTS: No pain, masses, or nipple discharge  RESPIRATORY: No cough, wheezing, chills or hemoptysis; No shortness of breath  CARDIOVASCULAR: No chest pain, palpitations, dizziness, or leg swelling  GASTROINTESTINAL: No abdominal or epigastric pain. No nausea, vomiting, or hematemesis; No diarrhea or constipation. No melena or hematochezia.  GENITOURINARY: No dysuria, frequency, hematuria, or incontinence  NEUROLOGICAL: No headaches, memory loss, loss of strength, numbness, or tremors  SKIN: No itching, burning, rashes, or lesions   LYMPH NODES: No enlarged glands  ENDOCRINE: No heat or cold intolerance; No hair loss  MUSCULOSKELETAL: No joint pain or swelling; No muscle, back, or extremity pain  PSYCHIATRIC: No depression, anxiety, mood swings, or difficulty sleeping  HEME/LYMPH: No easy bruising, or bleeding gums  ALLERGY AND IMMUNOLOGIC: No hives or eczema    Vital Signs Last 24 Hrs  T(C): 36 (12 Jul 2019 10:24), Max: 36.6 (11 Jul 2019 17:27)  T(F): 96.8 (12 Jul 2019 10:24), Max: 97.9 (12 Jul 2019 05:36)  HR: 68 (12 Jul 2019 10:24) (65 - 72)  BP: 141/57 (12 Jul 2019 10:24) (123/61 - 181/76)  BP(mean): --  RR: 17 (12 Jul 2019 10:24) (13 - 20)  SpO2: 98% (12 Jul 2019 10:24) (98% - 100%)    PHYSICAL EXAM:  GENERAL: NAD, well-groomed, well-developed  HEAD:  Atraumatic, Normocephalic  EYES: EOMI, PERRLA, conjunctiva and sclera clear  ENMT: No tonsillar erythema, exudates, or enlargement; Moist mucous membranes, Good dentition, No lesions  NECK: Supple, No JVD, Normal thyroid  NERVOUS SYSTEM:  Alert & Oriented X3, Good concentration; Motor Strength 5/5 B/L upper and lower extremities; DTRs 2+ intact and symmetric  CHEST/LUNG: Clear to percussion bilaterally; No rales, rhonchi, wheezing, or rubs  HEART: Regular rate and rhythm; No murmurs, rubs, or gallops  ABDOMEN: Soft, Nontender, Nondistended; Bowel sounds present  EXTREMITIES:  2+ Peripheral Pulses, No clubbing, cyanosis, or edema  LYMPH: No lymphadenopathy noted  SKIN: No rashes or lesions    LABS:                        11.9   4.59  )-----------( 208      ( 11 Jul 2019 10:40 )             38.4     07-11    141  |  105  |  58<H>  ----------------------------<  72  3.8   |  29  |  3.33<H>    Ca    8.6      11 Jul 2019 10:40          CAPILLARY BLOOD GLUCOSE      POCT Blood Glucose.: 152 mg/dL (12 Jul 2019 03:27)  POCT Blood Glucose.: 67 mg/dL (12 Jul 2019 02:24)  POCT Blood Glucose.: 63 mg/dL (12 Jul 2019 02:22)  POCT Blood Glucose.: 73 mg/dL (11 Jul 2019 22:36)  POCT Blood Glucose.: 69 mg/dL (11 Jul 2019 22:35)    CULTURES:    HEMOGLOBIN A1C:    CHOLESTEROL:        RADIOLOGY & ADDITIONAL TESTS:
Patient is a 64y old  Male who presents with a chief complaint of abdominal pain (17 Jul 2019 10:32)      INTERVAL HPI/OVERNIGHT EVENTS:  pt stated he had a good BM and feel better  MEDICATIONS  (STANDING):  aspirin enteric coated 81 milliGRAM(s) Oral daily  atorvastatin 40 milliGRAM(s) Oral at bedtime  calcium acetate 1334 milliGRAM(s) Oral three times a day with meals  ciprofloxacin     Tablet 250 milliGRAM(s) Oral every 24 hours  cyanocobalamin 1000 MICROGram(s) Oral daily  docusate sodium 100 milliGRAM(s) Oral two times a day  furosemide    Tablet 40 milliGRAM(s) Oral two times a day  heparin  Injectable 5000 Unit(s) SubCutaneous every 8 hours  labetalol 100 milliGRAM(s) Oral two times a day  multivitamin 1 Tablet(s) Oral daily  NIFEdipine XL 60 milliGRAM(s) Oral at bedtime  polyethylene glycol 3350 17 Gram(s) Oral daily  tamsulosin 0.4 milliGRAM(s) Oral at bedtime    MEDICATIONS  (PRN):  acetaminophen   Tablet .. 650 milliGRAM(s) Oral every 6 hours PRN Temp greater or equal to 38C (100.4F), Mild Pain (1 - 3)      Allergies    No Known Allergies    Intolerances        REVIEW OF SYSTEMS:  CONSTITUTIONAL: No fever, weight loss, or fatigue  EYES: No eye pain, visual disturbances, or discharge  ENMT:  No difficulty hearing, tinnitus, vertigo; No sinus or throat pain  NECK: No pain or stiffness  BREASTS: No pain, masses, or nipple discharge  RESPIRATORY: No cough, wheezing, chills or hemoptysis; No shortness of breath  CARDIOVASCULAR: No chest pain, palpitations, dizziness, or leg swelling  GASTROINTESTINAL: No abdominal or epigastric pain. No nausea, vomiting, or hematemesis; No diarrhea or constipation. No melena or hematochezia.  GENITOURINARY: No dysuria, frequency, hematuria, or incontinence  NEUROLOGICAL: No headaches, memory loss, loss of strength, numbness, or tremors  SKIN: No itching, burning, rashes, or lesions   LYMPH NODES: No enlarged glands  ENDOCRINE: No heat or cold intolerance; No hair loss  MUSCULOSKELETAL: No joint pain or swelling; No muscle, back, or extremity pain  PSYCHIATRIC: No depression, anxiety, mood swings, or difficulty sleeping  HEME/LYMPH: No easy bruising, or bleeding gums  ALLERGY AND IMMUNOLOGIC: No hives or eczema    Vital Signs Last 24 Hrs  T(C): 36.6 (17 Jul 2019 10:56), Max: 36.7 (16 Jul 2019 11:30)  T(F): 97.9 (17 Jul 2019 10:56), Max: 98 (16 Jul 2019 11:30)  HR: 65 (17 Jul 2019 10:56) (61 - 85)  BP: 134/65 (17 Jul 2019 10:56) (100/70 - 186/53)  BP(mean): --  RR: 17 (17 Jul 2019 10:56) (16 - 17)  SpO2: 97% (17 Jul 2019 10:56) (96% - 100%)    PHYSICAL EXAM:  GENERAL: NAD, well-groomed, well-developed  HEAD:  Atraumatic, Normocephalic  EYES: EOMI, PERRLA, conjunctiva and sclera clear  ENMT: No tonsillar erythema, exudates, or enlargement; Moist mucous membranes, Good dentition, No lesions  NECK: Supple, No JVD, Normal thyroid  NERVOUS SYSTEM:  Alert & Oriented X3, Good concentration; Motor Strength 5/5 B/L upper and lower extremities; DTRs 2+ intact and symmetric  CHEST/LUNG: Clear to percussion bilaterally; No rales, rhonchi, wheezing, or rubs  HEART: Regular rate and rhythm; No murmurs, rubs, or gallops  ABDOMEN: Soft, Nontender, Nondistended; Bowel sounds present  EXTREMITIES:  2+ Peripheral Pulses, No clubbing, cyanosis, or edema  LYMPH: No lymphadenopathy noted  SKIN: No rashes or lesions    LABS:              CAPILLARY BLOOD GLUCOSE      POCT Blood Glucose.: 110 mg/dL (17 Jul 2019 08:55)    CULTURES:    HEMOGLOBIN A1C:    CHOLESTEROL:        RADIOLOGY & ADDITIONAL TESTS:

## 2019-07-17 NOTE — PROGRESS NOTE ADULT - ASSESSMENT
1.	ESRD, on HD. Last HD was Friday, due tomorrow..  2.	Hypertension, controlled.  3.	Renal Osteodystrophy.  4.	PAD with AKA.    PLAN:  1.	HD on Monday.  2.	Follow up BMP.  3.	resume binders.  4.	Access creation as Outpatient
1.	ESRD, on HD currently. Tolerating it well.  2.	Hypertension, controlled.  3.	Renal Osteodystrophy.  4.	PAD with AKA.    PLAN:  1.	HD on Monday.  2.	Follow up BMP.  3.	resume binders.  4.	Access creation as Outpatient
1.	ESRD, on HD. Last HD was today.  2.	Hypertension, controlled.  3.	Renal Osteodystrophy.  4.	PAD with AKA.    PLAN:  1.	HD on Monday.  2.	Follow up BMP.  3.	resume binders
1.	ESRD, on HD. Last HD was yesterday.  2.	Hypertension, controlled.  3.	Renal Osteodystrophy.  4.	PAD with AKA.    PLAN:  1.	HD on Monday.  2.	Follow up BMP.  3.	resume binders.  4.	Access creation as Outpatient
63 yo M h/o ESRD, DM and HTN presented to the ER with diffuse abd pain, worsened after dialysis started yesterday. Pt denies sob, palpitation and fever. Feels better at present admitted for colitis    A/P:  ESRD:  HD MWF  HD today  Renal diet  Consent in the chart  Vascular Dr. 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
63 yo M h/o ESRD, DM and HTN presented to the ER with diffuse abd pain, worsened after dialysis started yesterday. Pt denies sob, palpitation and fever. Feels better at present admitted for colitis    A/P:  ESRD:  HD MWF  HD tomorrow  Renal diet  Consent in the chart  Vascular Dr. 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
63 yo M h/o ESRD, DM and HTN presented to the ER with diffuse abd pain, worsened after dialysis started yesterday. Pt denies sob, palpitation and fever. Feels better at present admitted for colitis    A/P:  ESRD:  HD MWF  getting HD, tolerating well. Vitals stable. Continue HD as ordered  Renal diet  Consent in the chart  Vascular Dr. 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
65 yo M h/o ESRD, DM and HTN presented to the ER with diffuse abd pain, worsened after dialysis started yesterday. Pt denies sob, palpitation and fever. Feels better at present admitted for colitis    A/P:  ESRD:  HD MWF  HD tomorrow  Renal diet  Consent in the chart  Vascular Dr. 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

## 2019-07-17 NOTE — PROGRESS NOTE ADULT - PROBLEM SELECTOR PROBLEM 3
Fecal impaction of colon

## 2019-07-17 NOTE — PROGRESS NOTE ADULT - PROBLEM SELECTOR PROBLEM 1
ESRD on dialysis

## 2019-07-17 NOTE — PROGRESS NOTE ADULT - PROBLEM SELECTOR PLAN 1
awaiting clearance for dc to rehab
vasc for AV graft
vasc for AV graft as out pt
vasc for AV graft as out pt
vasc for AV graft as out pt, dc home in AM after dialysis
vasc for AV graft as out pt, dc home in AM after dialysis
vasc for AV graft

## 2019-07-17 NOTE — PROGRESS NOTE ADULT - PROVIDER SPECIALTY LIST ADULT
Internal Medicine
Nephrology
Nephrology
Gastroenterology
Gastroenterology
Nephrology
Nephrology
Gastroenterology
Nephrology
Internal Medicine

## 2019-07-17 NOTE — DISCHARGE NOTE NURSING/CASE MANAGEMENT/SOCIAL WORK - NSDCDPATPORTLINK_GEN_ALL_CORE
You can access the [email or web address] Patient Portal, offered by [facility], by registering with the following website: [email or web address]

## 2019-07-17 NOTE — PROGRESS NOTE ADULT - REASON FOR ADMISSION
abdominal pain

## 2019-07-17 NOTE — PROGRESS NOTE ADULT - PROBLEM SELECTOR PROBLEM 2
Type 2 diabetes mellitus with chronic kidney disease on chronic dialysis, with long-term current use of insulin

## 2019-07-18 ENCOUNTER — INPATIENT (INPATIENT)
Facility: HOSPITAL | Age: 64
LOS: 3 days | Discharge: NURSING, REHAB OR LONG-TERM CARE | End: 2019-07-22
Attending: INTERNAL MEDICINE | Admitting: INTERNAL MEDICINE
Payer: MEDICAID

## 2019-07-18 VITALS
OXYGEN SATURATION: 98 % | RESPIRATION RATE: 20 BRPM | SYSTOLIC BLOOD PRESSURE: 186 MMHG | WEIGHT: 169.98 LBS | TEMPERATURE: 98 F | HEART RATE: 58 BPM | DIASTOLIC BLOOD PRESSURE: 90 MMHG

## 2019-07-18 DIAGNOSIS — Z89.612 ACQUIRED ABSENCE OF LEFT LEG ABOVE KNEE: Chronic | ICD-10-CM

## 2019-07-18 DIAGNOSIS — N18.6 END STAGE RENAL DISEASE: ICD-10-CM

## 2019-07-18 DIAGNOSIS — Z79.1 LONG TERM (CURRENT) USE OF NON-STEROIDAL ANTI-INFLAMMATORIES (NSAID): ICD-10-CM

## 2019-07-18 DIAGNOSIS — Z79.2 LONG TERM (CURRENT) USE OF ANTIBIOTICS: ICD-10-CM

## 2019-07-18 DIAGNOSIS — K52.9 NONINFECTIVE GASTROENTERITIS AND COLITIS, UNSPECIFIED: ICD-10-CM

## 2019-07-18 DIAGNOSIS — Z79.82 LONG TERM (CURRENT) USE OF ASPIRIN: ICD-10-CM

## 2019-07-18 DIAGNOSIS — K85.90 ACUTE PANCREATITIS WITHOUT NECROSIS OR INFECTION, UNSPECIFIED: ICD-10-CM

## 2019-07-18 DIAGNOSIS — D63.1 ANEMIA IN CHRONIC KIDNEY DISEASE: ICD-10-CM

## 2019-07-18 DIAGNOSIS — Z79.84 LONG TERM (CURRENT) USE OF ORAL HYPOGLYCEMIC DRUGS: ICD-10-CM

## 2019-07-18 DIAGNOSIS — R53.1 WEAKNESS: ICD-10-CM

## 2019-07-18 DIAGNOSIS — Z89.611 ACQUIRED ABSENCE OF RIGHT LEG ABOVE KNEE: Chronic | ICD-10-CM

## 2019-07-18 DIAGNOSIS — I15.1 HYPERTENSION SECONDARY TO OTHER RENAL DISORDERS: ICD-10-CM

## 2019-07-18 DIAGNOSIS — Z53.20 PROCEDURE AND TREATMENT NOT CARRIED OUT BECAUSE OF PATIENT'S DECISION FOR UNSPECIFIED REASONS: ICD-10-CM

## 2019-07-18 DIAGNOSIS — R06.6 HICCOUGH: ICD-10-CM

## 2019-07-18 DIAGNOSIS — Z99.2 DEPENDENCE ON RENAL DIALYSIS: ICD-10-CM

## 2019-07-18 DIAGNOSIS — Z89.511 ACQUIRED ABSENCE OF RIGHT LEG BELOW KNEE: ICD-10-CM

## 2019-07-18 DIAGNOSIS — Z98.890 OTHER SPECIFIED POSTPROCEDURAL STATES: Chronic | ICD-10-CM

## 2019-07-18 DIAGNOSIS — E11.22 TYPE 2 DIABETES MELLITUS WITH DIABETIC CHRONIC KIDNEY DISEASE: ICD-10-CM

## 2019-07-18 DIAGNOSIS — Z79.899 OTHER LONG TERM (CURRENT) DRUG THERAPY: ICD-10-CM

## 2019-07-18 DIAGNOSIS — Z89.511 ACQUIRED ABSENCE OF RIGHT LEG BELOW KNEE: Chronic | ICD-10-CM

## 2019-07-18 DIAGNOSIS — K56.41 FECAL IMPACTION: ICD-10-CM

## 2019-07-18 DIAGNOSIS — Z22.322 CARRIER OR SUSPECTED CARRIER OF METHICILLIN RESISTANT STAPHYLOCOCCUS AUREUS: ICD-10-CM

## 2019-07-18 DIAGNOSIS — I12.0 HYPERTENSIVE CHRONIC KIDNEY DISEASE WITH STAGE 5 CHRONIC KIDNEY DISEASE OR END STAGE RENAL DISEASE: ICD-10-CM

## 2019-07-18 DIAGNOSIS — Z79.891 LONG TERM (CURRENT) USE OF OPIATE ANALGESIC: ICD-10-CM

## 2019-07-18 LAB
ACETONE SERPL-MCNC: NEGATIVE — SIGNIFICANT CHANGE UP
ALBUMIN SERPL ELPH-MCNC: 2.7 G/DL — LOW (ref 3.3–5)
ALP SERPL-CCNC: 126 U/L — HIGH (ref 40–120)
ALT FLD-CCNC: 80 U/L — HIGH (ref 12–78)
AMYLASE P1 CFR SERPL: 118 U/L — HIGH (ref 25–115)
ANION GAP SERPL CALC-SCNC: 9 MMOL/L — SIGNIFICANT CHANGE UP (ref 5–17)
APTT BLD: 30 SEC — SIGNIFICANT CHANGE UP (ref 28.5–37)
AST SERPL-CCNC: 52 U/L — HIGH (ref 15–37)
BASOPHILS # BLD AUTO: 0.04 K/UL — SIGNIFICANT CHANGE UP (ref 0–0.2)
BASOPHILS NFR BLD AUTO: 0.8 % — SIGNIFICANT CHANGE UP (ref 0–2)
BILIRUB SERPL-MCNC: 0.2 MG/DL — SIGNIFICANT CHANGE UP (ref 0.2–1.2)
BUN SERPL-MCNC: 62 MG/DL — HIGH (ref 7–23)
CALCIUM SERPL-MCNC: 9.2 MG/DL — SIGNIFICANT CHANGE UP (ref 8.5–10.1)
CHLORIDE SERPL-SCNC: 97 MMOL/L — SIGNIFICANT CHANGE UP (ref 96–108)
CO2 SERPL-SCNC: 28 MMOL/L — SIGNIFICANT CHANGE UP (ref 22–31)
CREAT SERPL-MCNC: 3.39 MG/DL — HIGH (ref 0.5–1.3)
EOSINOPHIL # BLD AUTO: 0.14 K/UL — SIGNIFICANT CHANGE UP (ref 0–0.5)
EOSINOPHIL NFR BLD AUTO: 2.7 % — SIGNIFICANT CHANGE UP (ref 0–6)
GLUCOSE SERPL-MCNC: 79 MG/DL — SIGNIFICANT CHANGE UP (ref 70–99)
HCT VFR BLD CALC: 39.4 % — SIGNIFICANT CHANGE UP (ref 39–50)
HGB BLD-MCNC: 12.5 G/DL — LOW (ref 13–17)
IMM GRANULOCYTES NFR BLD AUTO: 0.2 % — SIGNIFICANT CHANGE UP (ref 0–1.5)
INR BLD: 0.93 RATIO — SIGNIFICANT CHANGE UP (ref 0.88–1.16)
LIDOCAIN IGE QN: 544 U/L — HIGH (ref 73–393)
LYMPHOCYTES # BLD AUTO: 1.38 K/UL — SIGNIFICANT CHANGE UP (ref 1–3.3)
LYMPHOCYTES # BLD AUTO: 26.7 % — SIGNIFICANT CHANGE UP (ref 13–44)
MCHC RBC-ENTMCNC: 29.8 PG — SIGNIFICANT CHANGE UP (ref 27–34)
MCHC RBC-ENTMCNC: 31.7 GM/DL — LOW (ref 32–36)
MCV RBC AUTO: 94 FL — SIGNIFICANT CHANGE UP (ref 80–100)
MONOCYTES # BLD AUTO: 0.56 K/UL — SIGNIFICANT CHANGE UP (ref 0–0.9)
MONOCYTES NFR BLD AUTO: 10.9 % — SIGNIFICANT CHANGE UP (ref 2–14)
NEUTROPHILS # BLD AUTO: 3.03 K/UL — SIGNIFICANT CHANGE UP (ref 1.8–7.4)
NEUTROPHILS NFR BLD AUTO: 58.7 % — SIGNIFICANT CHANGE UP (ref 43–77)
NRBC # BLD: 0 /100 WBCS — SIGNIFICANT CHANGE UP (ref 0–0)
PLATELET # BLD AUTO: 221 K/UL — SIGNIFICANT CHANGE UP (ref 150–400)
POTASSIUM SERPL-MCNC: 4.8 MMOL/L — SIGNIFICANT CHANGE UP (ref 3.5–5.3)
POTASSIUM SERPL-SCNC: 4.8 MMOL/L — SIGNIFICANT CHANGE UP (ref 3.5–5.3)
PROT SERPL-MCNC: 7.6 GM/DL — SIGNIFICANT CHANGE UP (ref 6–8.3)
PROTHROM AB SERPL-ACNC: 10.4 SEC — SIGNIFICANT CHANGE UP (ref 10–12.9)
RBC # BLD: 4.19 M/UL — LOW (ref 4.2–5.8)
RBC # FLD: 16 % — HIGH (ref 10.3–14.5)
SODIUM SERPL-SCNC: 134 MMOL/L — LOW (ref 135–145)
WBC # BLD: 5.16 K/UL — SIGNIFICANT CHANGE UP (ref 3.8–10.5)
WBC # FLD AUTO: 5.16 K/UL — SIGNIFICANT CHANGE UP (ref 3.8–10.5)

## 2019-07-18 PROCEDURE — 99285 EMERGENCY DEPT VISIT HI MDM: CPT

## 2019-07-18 PROCEDURE — 74176 CT ABD & PELVIS W/O CONTRAST: CPT | Mod: 26

## 2019-07-18 PROCEDURE — 71045 X-RAY EXAM CHEST 1 VIEW: CPT | Mod: 26

## 2019-07-18 PROCEDURE — 93010 ELECTROCARDIOGRAM REPORT: CPT

## 2019-07-18 RX ORDER — HEPARIN SODIUM 5000 [USP'U]/ML
5000 INJECTION INTRAVENOUS; SUBCUTANEOUS EVERY 12 HOURS
Refills: 0 | Status: DISCONTINUED | OUTPATIENT
Start: 2019-07-18 | End: 2019-07-22

## 2019-07-18 RX ORDER — FUROSEMIDE 40 MG
40 TABLET ORAL
Refills: 0 | Status: DISCONTINUED | OUTPATIENT
Start: 2019-07-18 | End: 2019-07-22

## 2019-07-18 RX ORDER — LACTOBACILLUS ACIDOPHILUS 100MM CELL
1 CAPSULE ORAL
Refills: 0 | Status: DISCONTINUED | OUTPATIENT
Start: 2019-07-18 | End: 2019-07-22

## 2019-07-18 RX ORDER — SODIUM CHLORIDE 9 MG/ML
1000 INJECTION, SOLUTION INTRAVENOUS
Refills: 0 | Status: DISCONTINUED | OUTPATIENT
Start: 2019-07-18 | End: 2019-07-22

## 2019-07-18 RX ORDER — DEXTROSE 50 % IN WATER 50 %
25 SYRINGE (ML) INTRAVENOUS ONCE
Refills: 0 | Status: DISCONTINUED | OUTPATIENT
Start: 2019-07-18 | End: 2019-07-22

## 2019-07-18 RX ORDER — ATORVASTATIN CALCIUM 80 MG/1
40 TABLET, FILM COATED ORAL AT BEDTIME
Refills: 0 | Status: DISCONTINUED | OUTPATIENT
Start: 2019-07-18 | End: 2019-07-22

## 2019-07-18 RX ORDER — CIPROFLOXACIN LACTATE 400MG/40ML
250 VIAL (ML) INTRAVENOUS DAILY
Refills: 0 | Status: DISCONTINUED | OUTPATIENT
Start: 2019-07-18 | End: 2019-07-22

## 2019-07-18 RX ORDER — IOHEXOL 300 MG/ML
30 INJECTION, SOLUTION INTRAVENOUS ONCE
Refills: 0 | Status: COMPLETED | OUTPATIENT
Start: 2019-07-18 | End: 2019-07-18

## 2019-07-18 RX ORDER — DEXTROSE 50 % IN WATER 50 %
15 SYRINGE (ML) INTRAVENOUS ONCE
Refills: 0 | Status: DISCONTINUED | OUTPATIENT
Start: 2019-07-18 | End: 2019-07-22

## 2019-07-18 RX ORDER — DEXTROSE 50 % IN WATER 50 %
12.5 SYRINGE (ML) INTRAVENOUS ONCE
Refills: 0 | Status: DISCONTINUED | OUTPATIENT
Start: 2019-07-18 | End: 2019-07-22

## 2019-07-18 RX ORDER — ONDANSETRON 8 MG/1
4 TABLET, FILM COATED ORAL EVERY 6 HOURS
Refills: 0 | Status: DISCONTINUED | OUTPATIENT
Start: 2019-07-18 | End: 2019-07-22

## 2019-07-18 RX ORDER — TAMSULOSIN HYDROCHLORIDE 0.4 MG/1
0.4 CAPSULE ORAL AT BEDTIME
Refills: 0 | Status: DISCONTINUED | OUTPATIENT
Start: 2019-07-18 | End: 2019-07-22

## 2019-07-18 RX ORDER — GLUCAGON INJECTION, SOLUTION 0.5 MG/.1ML
1 INJECTION, SOLUTION SUBCUTANEOUS ONCE
Refills: 0 | Status: DISCONTINUED | OUTPATIENT
Start: 2019-07-18 | End: 2019-07-22

## 2019-07-18 RX ORDER — PANTOPRAZOLE SODIUM 20 MG/1
40 TABLET, DELAYED RELEASE ORAL DAILY
Refills: 0 | Status: DISCONTINUED | OUTPATIENT
Start: 2019-07-18 | End: 2019-07-22

## 2019-07-18 RX ORDER — ASPIRIN/CALCIUM CARB/MAGNESIUM 324 MG
81 TABLET ORAL DAILY
Refills: 0 | Status: DISCONTINUED | OUTPATIENT
Start: 2019-07-18 | End: 2019-07-22

## 2019-07-18 RX ORDER — OXYCODONE AND ACETAMINOPHEN 5; 325 MG/1; MG/1
1 TABLET ORAL EVERY 4 HOURS
Refills: 0 | Status: DISCONTINUED | OUTPATIENT
Start: 2019-07-18 | End: 2019-07-22

## 2019-07-18 RX ORDER — ONDANSETRON 8 MG/1
4 TABLET, FILM COATED ORAL ONCE
Refills: 0 | Status: COMPLETED | OUTPATIENT
Start: 2019-07-18 | End: 2019-07-18

## 2019-07-18 RX ORDER — PANTOPRAZOLE SODIUM 20 MG/1
40 TABLET, DELAYED RELEASE ORAL ONCE
Refills: 0 | Status: COMPLETED | OUTPATIENT
Start: 2019-07-18 | End: 2019-07-18

## 2019-07-18 RX ORDER — NIFEDIPINE 30 MG
90 TABLET, EXTENDED RELEASE 24 HR ORAL AT BEDTIME
Refills: 0 | Status: DISCONTINUED | OUTPATIENT
Start: 2019-07-18 | End: 2019-07-19

## 2019-07-18 RX ORDER — PANTOPRAZOLE SODIUM 20 MG/1
40 TABLET, DELAYED RELEASE ORAL DAILY
Refills: 0 | Status: DISCONTINUED | OUTPATIENT
Start: 2019-07-18 | End: 2019-07-18

## 2019-07-18 RX ORDER — HYDRALAZINE HCL 50 MG
10 TABLET ORAL ONCE
Refills: 0 | Status: COMPLETED | OUTPATIENT
Start: 2019-07-18 | End: 2019-07-18

## 2019-07-18 RX ORDER — LABETALOL HCL 100 MG
100 TABLET ORAL
Refills: 0 | Status: DISCONTINUED | OUTPATIENT
Start: 2019-07-18 | End: 2019-07-22

## 2019-07-18 RX ORDER — LACTULOSE 10 G/15ML
10 SOLUTION ORAL AT BEDTIME
Refills: 0 | Status: DISCONTINUED | OUTPATIENT
Start: 2019-07-18 | End: 2019-07-19

## 2019-07-18 RX ORDER — SODIUM CHLORIDE 9 MG/ML
500 INJECTION INTRAMUSCULAR; INTRAVENOUS; SUBCUTANEOUS ONCE
Refills: 0 | Status: COMPLETED | OUTPATIENT
Start: 2019-07-18 | End: 2019-07-18

## 2019-07-18 RX ADMIN — SODIUM CHLORIDE 500 MILLILITER(S): 9 INJECTION INTRAMUSCULAR; INTRAVENOUS; SUBCUTANEOUS at 19:09

## 2019-07-18 RX ADMIN — Medication 10 MILLIGRAM(S): at 20:25

## 2019-07-18 RX ADMIN — PANTOPRAZOLE SODIUM 40 MILLIGRAM(S): 20 TABLET, DELAYED RELEASE ORAL at 19:09

## 2019-07-18 RX ADMIN — IOHEXOL 30 MILLILITER(S): 300 INJECTION, SOLUTION INTRAVENOUS at 17:30

## 2019-07-18 RX ADMIN — ONDANSETRON 4 MILLIGRAM(S): 8 TABLET, FILM COATED ORAL at 19:09

## 2019-07-18 NOTE — H&P ADULT - HISTORY OF PRESENT ILLNESS
64 years old male h/o ESRD brought by ems from the nursing home c/o nausea vomiting unable to tolerate orally.

## 2019-07-18 NOTE — ED PROVIDER NOTE - PROGRESS NOTE DETAILS
Dr. Dorman is notified. Dr. Thea thompson gastroenterologist is called and left message as request of Dr. Dorman.

## 2019-07-18 NOTE — ED PROVIDER NOTE - CARE PLAN
Principal Discharge DX:	Vomiting alone Principal Discharge DX:	Pancreatitis  Secondary Diagnosis:	ESRD on hemodialysis

## 2019-07-18 NOTE — H&P ADULT - NSHPLABSRESULTS_GEN_ALL_CORE
12.5   5.16  )-----------( 221      ( 18 Jul 2019 18:33 )             39.4     07-18    134<L>  |  97  |  62<H>  ----------------------------<  79  4.8   |  28  |  3.39<H>    Ca    9.2      18 Jul 2019 18:33    TPro  7.6  /  Alb  2.7<L>  /  TBili  0.2  /  DBili  x   /  AST  52<H>  /  ALT  80<H>  /  AlkPhos  126<H>  07-18    PT/INR - ( 18 Jul 2019 18:33 )   PT: 10.4 sec;   INR: 0.93 ratio         PTT - ( 18 Jul 2019 18:33 )  PTT:30.0 sec

## 2019-07-18 NOTE — PATIENT PROFILE ADULT - DO YOU FEEL LIKE HURTING YOURSELF OR OTHERS?
Requested Prescriptions     Pending Prescriptions Disp Refills    HYDROcodone-acetaminophen (NORCO)  MG per tablet 90 tablet 0     Sig: Take 1 tablet by mouth 3 times daily as needed for Pain for up to 30 days. Must last 30 days    pregabalin (LYRICA) 300 MG capsule 60 capsule 5     Sig: Take 1 capsule by mouth 2 times daily for 30 days.  methylphenidate (RITALIN) 20 MG tablet 30 tablet 0     Sig: Take 1 tablet by mouth daily for 30 days.      Last OV  2/26/19  Next OV  5/28/19  Last Rx  Norco - 3/18/19      Lyrica - 11/17/18 with 5 refills      Ritalin - 3/22/19  Britta Sin   4/9/19
no

## 2019-07-18 NOTE — ED PROVIDER NOTE - PMH
Below knee amputation status, right    CKD (chronic kidney disease)    DM (diabetes mellitus)    DM (diabetes mellitus)    HTN (hypertension)    HTN (hypertension)    Kidney disease    MRSA (methicillin resistant Staphylococcus aureus) colonization  (hx/o MRSA growth from wound culture)  Wound  (chronic wounds to left foot and leg)

## 2019-07-18 NOTE — ED PROVIDER NOTE - OBJECTIVE STATEMENT
64 years old male by ems from the nursing home c/o nausea vomiting unable to tolerate orally. Pt was admitted here and discharged on July 17, 2019 for colitis. Pt however denies associated dizziness, abd pain, cough, sob, chest pain, fever, chills, dysuria. 64 years old male by ems from the nursing home c/o nausea vomiting unable to tolerate orally. Pt was admitted here and discharged on July 17, 2019 for colitis. Pt however denies associated dizziness, abd pain, cough, sob, chest pain, fever, chills, dysuria. pt has a hx of hemodialysis last dialysis was yesterday next due tomorrow.

## 2019-07-18 NOTE — ED ADULT TRIAGE NOTE - CHIEF COMPLAINT QUOTE
ems state, " pt sent from [facility] for vomiting , d/c from hospital yesterday "

## 2019-07-18 NOTE — PATIENT PROFILE ADULT - NSASFUNCLEVELADLAMBULATE_GEN_A_NUR
VINICIO ambulatory encounter  INTERNAL MEDICINE MALE ANNUAL PHYSICAL EXAM    CHIEF COMPLAINT:  Physical       HISTORY OF PRESENT ILLNESS:    Nemesio Dubois is a pleasant 45 year old male who presents today for an annual physical exam.    New concerns discussed include:   Type 2 DM - since 2008 -  Was on metformin 1000 BID + glyburide 5 BID   Lost follow up with his previous PCP and he was not taking meds for past 3 weeks   A1c done on 1/12/18 was 10.5 - discussed these results with pt and the need for better control. Pt would like to stop metformin due to Gi side effects. Will add glipizide, actos and januvia. He will find out if these are covered by his insurance.   Explained dose benefits side effects of each medication, information added to AVS.   Discussed the need to lose wt, exercise daily, home monitoring, low carb diet.   No foot ulcers of open wounds , but does have neuropathic pain of both feet. Given information on gabapentin - he will read about this and contact me if interested to consider this.   Lipid panel pending - will order statin based on this.         PROBLEM LIST:    Patient Active Problem List   Diagnosis   • Uncontrolled type 2 diabetes mellitus with diabetic polyneuropathy, without long-term current use of insulin (CMS/[facility])   • Neuropathy involving both lower extremities       HISTORIES:    I have reviewed the past medical history, family history, social history, medications and allergies listed in the medical record as obtained by my nursing staff and support staff and agree with their documentation.    REVIEW OF SYSTEMS:    Constitutional:  No weight change.  No significant fatigue.  Eyes:  No visual disturbances.    HENT:  No hearing problems.  No ear pain.  No sore throat.   Respiratory:  No cough.  No wheezing.  No shortness of breath.    Cardiovascular:  No chest pain.  No palpitations.  No swelling.  Gastrointestinal:  No abdominal pain.  No frequent heartburn.  No nausea.  No  vomiting.  No diarrhea.  No constipation.  No blood in stool.   Genitourinary:  No dysuria. + frequency.  No hematuria.  No incontinence. + ED   Extremities:  No significant joint swelling.  No joint pain.  Skin:  No change in moles.  No rash.   Neurologic:  No weakness.  No numbness.  No headache.  No dizziness.     Endocrine:  No heat intolerance.  No cold intolerance.  Psychiatric:  No depression.  No appetite changes.  No changes in sleep.      PHYSICAL EXAM:  Vital Signs:    Visit Vitals  /70 (BP Location: [facility], Patient Position: Sitting, Cuff Size: Large Adult)   Pulse 115   Resp 20   Ht 6' (1.829 m)   Wt 108.7 kg   SpO2 96%   BMI 32.50 kg/m²       Constitutional:  Well developed, well nourished, no acute distress.   Eyes:  Pupils equal, round, reactive to light and accommodation, extraocular movements intact. Conjunctivae pink.  Sclerae anicteric.  HENT:  Normocephalic and atraumatic.  Bilateral external ears are normal.  On otoscopic exam, external auditory canals  -have dry cerumen both ears.  External nose appears normal.  Nasal mucosa, septum and turbinates appear normal.  Oropharynx moist and within normal limits.  Lips and gums within normal limits.  Neck:  Supple, nontender.  Normal range of motion.  No masses.  No thyromegaly.  Trachea midline.    Respiratory:  Clear to auscultation and percussion bilaterally.  No wheezes, rales, rhonchi or crackles.  Good respiratory effort.  No retraction or accessory muscle use.  Symmetrical chest expansion.    Cardiovascular:  Regular rate and rhythm. No murmurs, rubs, or gallops.  Point of maximal impulse nondisplaced.  Normal S1 and S2.  No S3 or S4.  No jugular venous distension.  No carotid bruits.  Good dorsalis pedis pulses bilaterally.  No peripheral edema.  Gastrointestinal:  Soft, nontender, nondistended.  No rebound or guarding.  Normal bowel sounds.  No hepatomegaly.  No splenomegaly.  No pulsatile or other abdominal masses.  No hernias noted.     Genitourinary:  Normal external genitalia.  No inguinal hernias are noted.  No testicular masses or tenderness.  Phallus without lesions or discharge.    Rectal:  Normal tone.  No masses.  Prostate smooth, soft with no nodules or asymmetry.  Musculoskeletal:  No clubbing, cyanosis or edema.  Full range of motion in all 4 extremities proximal and distal.  No back tenderness.    Neurologic:  Alert and oriented x3.  Deep tendon reflexes 2+ in both upper and lower extremities.  Gait and station are normal.  Proximal and distal strength 5/5 in bilateral upper and lower extremities with normal tone.  No gross sensory deficits noted.  Cranial nerves II-XII intact.    Skin:  Warm and dry.  No rashes, lesions or subcutaneous masses.    Lymphatic:  No lymphadenopathy in submental, submandibular, or cervical chain.  No supraclavicular lymphadenopathy.  No axillary or inguinal lymphadenopathy.  Psychiatric:  The patient is cooperative and demonstrates good judgment, insight and affect.    Diabetic Foot Exam Documentation     Normal Bilateral Foot Exam: Skin integrity is normal. Dorsalis pedis and posterior tibial pulses are present.  Pressure sensation using the [place] monofilament is present.      LABORATORY DATA:    All pertinent laboratory results were reviewed.        ASSESSMENT:    1. Annual physical exam    2. Uncontrolled type 2 diabetes mellitus with diabetic polyneuropathy, without long-term current use of insulin (CMS/[facility])    3. Need for Tdap vaccination    4. Neuropathy involving both lower extremities        PLAN:    Orders Placed This Encounter   • TETANUS DIPHTHERIA ACELLULAR PERTUSSIS VACC, 11+ YRS (ADACEL)   • Vitamin B12 and Folate   • Glycohemoglobin   • SERVICE TO OPHTHALMOLOGY   • gemfibrozil (LOPID) 600 MG tablet   • DISCONTD: glyBURIDE (DIABETA) 5 MG tablet   • glipiZIDE (GLUCOTROL ER) 10 MG 24 hr tablet   • pioglitazone (ACTOS) 45 MG tablet   • sitaGLIPtin (JANUVIA) 100 MG tablet       Return  in about 4 weeks (around 2/14/2018) for return with the blood glucose log .    Instructions provided as documented in the after visit summary.    The patient indicated understanding of the diagnosis and agreed with the plan of care.       2 = assistive person

## 2019-07-19 DIAGNOSIS — R06.6 HICCOUGH: ICD-10-CM

## 2019-07-19 LAB
ANION GAP SERPL CALC-SCNC: 9 MMOL/L — SIGNIFICANT CHANGE UP (ref 5–17)
BUN SERPL-MCNC: 65 MG/DL — HIGH (ref 7–23)
CALCIUM SERPL-MCNC: 9.2 MG/DL — SIGNIFICANT CHANGE UP (ref 8.5–10.1)
CHLORIDE SERPL-SCNC: 100 MMOL/L — SIGNIFICANT CHANGE UP (ref 96–108)
CO2 SERPL-SCNC: 28 MMOL/L — SIGNIFICANT CHANGE UP (ref 22–31)
CREAT SERPL-MCNC: 3.75 MG/DL — HIGH (ref 0.5–1.3)
GLUCOSE BLDC GLUCOMTR-MCNC: 67 MG/DL — LOW (ref 70–99)
GLUCOSE BLDC GLUCOMTR-MCNC: 75 MG/DL — SIGNIFICANT CHANGE UP (ref 70–99)
GLUCOSE BLDC GLUCOMTR-MCNC: 80 MG/DL — SIGNIFICANT CHANGE UP (ref 70–99)
GLUCOSE BLDC GLUCOMTR-MCNC: 95 MG/DL — SIGNIFICANT CHANGE UP (ref 70–99)
GLUCOSE SERPL-MCNC: 56 MG/DL — LOW (ref 70–99)
HBA1C BLD-MCNC: 4.2 % — SIGNIFICANT CHANGE UP (ref 4–5.6)
HCT VFR BLD CALC: 40.2 % — SIGNIFICANT CHANGE UP (ref 39–50)
HGB BLD-MCNC: 12.4 G/DL — LOW (ref 13–17)
LIDOCAIN IGE QN: 297 U/L — SIGNIFICANT CHANGE UP (ref 73–393)
MCHC RBC-ENTMCNC: 29.5 PG — SIGNIFICANT CHANGE UP (ref 27–34)
MCHC RBC-ENTMCNC: 30.8 GM/DL — LOW (ref 32–36)
MCV RBC AUTO: 95.5 FL — SIGNIFICANT CHANGE UP (ref 80–100)
NRBC # BLD: 0 /100 WBCS — SIGNIFICANT CHANGE UP (ref 0–0)
PLATELET # BLD AUTO: 223 K/UL — SIGNIFICANT CHANGE UP (ref 150–400)
POTASSIUM SERPL-MCNC: 4.9 MMOL/L — SIGNIFICANT CHANGE UP (ref 3.5–5.3)
POTASSIUM SERPL-SCNC: 4.9 MMOL/L — SIGNIFICANT CHANGE UP (ref 3.5–5.3)
RBC # BLD: 4.21 M/UL — SIGNIFICANT CHANGE UP (ref 4.2–5.8)
RBC # FLD: 15.9 % — HIGH (ref 10.3–14.5)
SODIUM SERPL-SCNC: 137 MMOL/L — SIGNIFICANT CHANGE UP (ref 135–145)
WBC # BLD: 4.21 K/UL — SIGNIFICANT CHANGE UP (ref 3.8–10.5)
WBC # FLD AUTO: 4.21 K/UL — SIGNIFICANT CHANGE UP (ref 3.8–10.5)

## 2019-07-19 RX ORDER — NIFEDIPINE 30 MG
60 TABLET, EXTENDED RELEASE 24 HR ORAL AT BEDTIME
Refills: 0 | Status: DISCONTINUED | OUTPATIENT
Start: 2019-07-20 | End: 2019-07-22

## 2019-07-19 RX ORDER — CHLORPROMAZINE HCL 10 MG
25 TABLET ORAL
Refills: 0 | Status: DISCONTINUED | OUTPATIENT
Start: 2019-07-19 | End: 2019-07-22

## 2019-07-19 RX ORDER — LACTULOSE 10 G/15ML
10 SOLUTION ORAL AT BEDTIME
Refills: 0 | Status: DISCONTINUED | OUTPATIENT
Start: 2019-07-19 | End: 2019-07-22

## 2019-07-19 RX ADMIN — ATORVASTATIN CALCIUM 40 MILLIGRAM(S): 80 TABLET, FILM COATED ORAL at 22:25

## 2019-07-19 RX ADMIN — Medication 25 MILLIGRAM(S): at 13:01

## 2019-07-19 RX ADMIN — HEPARIN SODIUM 5000 UNIT(S): 5000 INJECTION INTRAVENOUS; SUBCUTANEOUS at 05:38

## 2019-07-19 RX ADMIN — HEPARIN SODIUM 5000 UNIT(S): 5000 INJECTION INTRAVENOUS; SUBCUTANEOUS at 17:41

## 2019-07-19 RX ADMIN — Medication 1 TABLET(S): at 17:41

## 2019-07-19 RX ADMIN — TAMSULOSIN HYDROCHLORIDE 0.4 MILLIGRAM(S): 0.4 CAPSULE ORAL at 22:54

## 2019-07-19 RX ADMIN — Medication 1 TABLET(S): at 05:38

## 2019-07-19 RX ADMIN — Medication 81 MILLIGRAM(S): at 11:59

## 2019-07-19 RX ADMIN — ONDANSETRON 4 MILLIGRAM(S): 8 TABLET, FILM COATED ORAL at 06:34

## 2019-07-19 RX ADMIN — LACTULOSE 10 GRAM(S): 10 SOLUTION ORAL at 22:54

## 2019-07-19 RX ADMIN — PANTOPRAZOLE SODIUM 40 MILLIGRAM(S): 20 TABLET, DELAYED RELEASE ORAL at 11:58

## 2019-07-19 RX ADMIN — Medication 40 MILLIGRAM(S): at 05:39

## 2019-07-19 RX ADMIN — Medication 250 MILLIGRAM(S): at 11:59

## 2019-07-19 RX ADMIN — Medication 90 MILLIGRAM(S): at 15:10

## 2019-07-19 RX ADMIN — Medication 100 MILLIGRAM(S): at 05:38

## 2019-07-19 NOTE — CONSULT NOTE ADULT - ASSESSMENT
64 years old male h/o ESRD brought by ems from the nursing home c/o nausea vomiting unable to tolerate orally.    A/P:    ESRD:  HD MWF  HD today  Consent in the chart  Renal diet    HTN:  Controlled  Continue current meds    Nausea/Vomiting/hiccough:  Follow up GI

## 2019-07-19 NOTE — CONSULT NOTE ADULT - SUBJECTIVE AND OBJECTIVE BOX
Dr. Navarro  Office [phone]  Cell [phone]  Jane CONNELL  Cell [phone]    HPI:  64 years old male h/o ESRD brought by ems from the nursing home c/o nausea vomiting unable to tolerate orally. c/o hiccough       Allergies:  No Known Allergies      PAST MEDICAL & SURGICAL HISTORY:  Kidney disease  HTN (hypertension)  DM (diabetes mellitus)  Wound: (chronic wounds to left foot and leg)  MRSA (methicillin resistant Staphylococcus aureus) colonization: (hx/o MRSA growth from wound culture)  CKD (chronic kidney disease)  Below knee amputation status, right  HTN (hypertension)  DM (diabetes mellitus)  Amputated right leg: below knee  Amputated left leg: above knee  H/O peripheral artery bypass: left fem to below-knee pop with RSVG  S/P BKA (below knee amputation) unilateral, right      Home Medications Reviewed    Hospital Medications:   MEDICATIONS  (STANDING):  aspirin enteric coated 81 milliGRAM(s) Oral daily  atorvastatin 40 milliGRAM(s) Oral at bedtime  ciprofloxacin     Tablet 250 milliGRAM(s) Oral daily  dextrose 5%. 1000 milliLiter(s) (50 mL/Hr) IV Continuous <Continuous>  dextrose 50% Injectable 12.5 Gram(s) IV Push once  dextrose 50% Injectable 25 Gram(s) IV Push once  dextrose 50% Injectable 25 Gram(s) IV Push once  furosemide    Tablet 40 milliGRAM(s) Oral two times a day  heparin  Injectable 5000 Unit(s) SubCutaneous every 12 hours  labetalol 100 milliGRAM(s) Oral two times a day  lactobacillus acidophilus 1 Tablet(s) Oral two times a day  lactulose Syrup 10 Gram(s) Oral at bedtime  NIFEdipine XL 90 milliGRAM(s) Oral at bedtime  pantoprazole  Injectable 40 milliGRAM(s) IV Push daily  tamsulosin 0.4 milliGRAM(s) Oral at bedtime      SOCIAL HISTORY:  Denies ETOh, Smoking,     FAMILY HISTORY:  Family history of diabetes mellitus (Mother)      REVIEW OF SYSTEMS:  CONSTITUTIONAL: No weakness, fevers or chills  EYES/ENT: No visual changes;  No vertigo or throat pain   NECK: No pain or stiffness  RESPIRATORY: No cough, wheezing, hemoptysis; No shortness of breath  CARDIOVASCULAR: No chest pain or palpitations.  GASTROINTESTINAL: as per HPI  GENITOURINARY: No dysuria, frequency, foamy urine, urinary urgency, incontinence or hematuria  NEUROLOGICAL: No numbness or weakness  SKIN: No itching, burning, rashes, or lesions   VASCULAR: No bilateral lower extremity edema.   All other review of systems is negative unless indicated above.    VITALS:  T(F): 98 (07-19-19 @ 05:38), Max: 98.1 (07-18-19 @ 16:42)  HR: 61 (07-19-19 @ 05:38)  BP: 131/62 (07-19-19 @ 05:38)  RR: 18 (07-19-19 @ 05:38)  SpO2: 98% (07-19-19 @ 05:38)  Wt(kg): --      Weight (kg): 77.1 (07-18 @ 16:42)    PHYSICAL EXAM:  Constitutional: NAD  HEENT: anicteric sclera, oropharynx clear, MMM  Neck: No JVD  Respiratory: CTAB, no wheezes, rales or rhonchi  Cardiovascular: S1, S2, RRR  Gastrointestinal: BS+, soft, NT/ND  Extremities: right BKA, left AKA  Neurological: A/O x 3, no focal deficits  Psychiatric: Normal mood, normal affect  : No CVA tenderness. No cervantes.   Skin: No rashes  Vascular Access:permacath    LABS:  07-19    137  |  100  |  65<H>  ----------------------------<  56<L>  4.9   |  28  |  3.75<H>    Ca    9.2      19 Jul 2019 08:38    TPro  7.6  /  Alb  2.7<L>  /  TBili  0.2  /  DBili      /  AST  52<H>  /  ALT  80<H>  /  AlkPhos  126<H>  07-18    Creatinine Trend: 3.75 <--, 3.39 <--, 4.51 <--                        12.4   4.21  )-----------( 223      ( 19 Jul 2019 08:38 )             40.2     Urine Studies:        RADIOLOGY & ADDITIONAL STUDIES:

## 2019-07-19 NOTE — CHART NOTE - NSCHARTNOTEFT_GEN_A_CORE
Hospitalist Medicine DNP    CC: RN called to stat patients /93.  Patient is seen and evaluated. Was due for HD today and he refused to go for dialysis today. and scheduled for tomorrow.  HPI:  64 years old male h/o ESRD brought by ems from the nursing home c/o nausea vomiting unable to tolerate orally. (18 Jul 2019 23:21)    Vital Signs Last 24 Hrs  T(C): 36.7 (19 Jul 2019 17:37), Max: 36.7 (18 Jul 2019 20:11)  T(F): 98 (19 Jul 2019 17:37), Max: 98 (18 Jul 2019 20:11)  HR: 64 (19 Jul 2019 18:31) (58 - 64)  BP: 88/42 (19 Jul 2019 18:31) (88/42 - 204/93)  BP(mean): --  RR: 18 (19 Jul 2019 17:37) (17 - 20)  SpO2: 97% (19 Jul 2019 17:37) (95% - 98%)    REVIEW OF SYSTEMS:  RESPIRATORY: No cough, wheezing, chills or hemoptysis; No shortness of breath  CARDIOVASCULAR: No chest pain, palpitations, dizziness, or leg swelling  GASTROINTESTINAL: No abdominal or epigastric pain. No nausea, vomiting, or hematemesis; No diarrhea or constipation. No melena or hematochezia.  GENITOURINARY: No dysuria, frequency, hematuria, or incontinence  NEUROLOGICAL: No headaches, memory loss, loss of strength, numbness, or tremors    PHYSICAL EXAM:  GENERAL: NAD, well-groomed, well-developed  NERVOUS SYSTEM:  Alert & Oriented X3, Good concentration; Motor Strength 5/5 B/L upper and lower extremities; DTRs 2+ intact and symmetric  CHEST/LUNG: Clear to percussion bilaterally; No rales, rhonchi, wheezing, or rubs  HEART: Regular rate and rhythm; No murmurs, rubs, or gallops  ABDOMEN: Soft, Nontender, Nondistended; Bowel sounds present  EXTREMITIES:  2+ Peripheral Pulses, No clubbing, cyanosis, or edema    Assessment: Patient 64y with PAST MEDICAL & SURGICAL HISTORY:  Kidney disease, HTN (hypertension), DM (diabetes mellitus), Wound: (chronic wounds to left foot and leg)  MRSA (methicillin resistant Staphylococcus aureus) colonization: (hx/o MRSA growth from wound culture)  CKD (chronic kidney disease),Below knee amputation status, right, Amputated left leg: above knee H/O peripheral artery bypass:   left fem to below-knee pop with RSVG    admitted with PANCREATITIS, ESRD ON HEMODIALYSIS  WEAK, Kidney disease, HTN (hypertension)  MRSA (methicillin resistant Staphylococcus aureus) colonization  CKD (chronic kidney disease)  Below knee amputation status, right    Plan:  6pm Nifedipine 90 mg po x 1 dose given at 3 pm  - Continue current treatment  - will continue to follow up    Addendum;   RN called with BP 91/ 49 and 88/42. Patient is seen and evaluated.   Is asymptomatic, resting in bed.  reduced Nifedipine 60 mg daily.   Hold 6 pm labetalol 100mg today.  Monitor BP q4 hours x12 hours.  for HD Tomorrow.  D/w Dr. Dorman aware and agree with the plan   Time Spent: 40

## 2019-07-20 LAB
GLUCOSE BLDC GLUCOMTR-MCNC: 117 MG/DL — HIGH (ref 70–99)
GLUCOSE BLDC GLUCOMTR-MCNC: 60 MG/DL — LOW (ref 70–99)
GLUCOSE BLDC GLUCOMTR-MCNC: 67 MG/DL — LOW (ref 70–99)
GLUCOSE BLDC GLUCOMTR-MCNC: 76 MG/DL — SIGNIFICANT CHANGE UP (ref 70–99)
GLUCOSE BLDC GLUCOMTR-MCNC: 90 MG/DL — SIGNIFICANT CHANGE UP (ref 70–99)
HBV CORE AB SER-ACNC: SIGNIFICANT CHANGE UP
HBV CORE IGM SER-ACNC: SIGNIFICANT CHANGE UP
HBV SURFACE AB SER-ACNC: <3 MIU/ML — LOW
HBV SURFACE AG SER-ACNC: SIGNIFICANT CHANGE UP

## 2019-07-20 RX ORDER — SOD SULF/SODIUM/NAHCO3/KCL/PEG
2000 SOLUTION, RECONSTITUTED, ORAL ORAL ONCE
Refills: 0 | Status: COMPLETED | OUTPATIENT
Start: 2019-07-20 | End: 2019-07-20

## 2019-07-20 RX ORDER — DEXTROSE 50 % IN WATER 50 %
25 SYRINGE (ML) INTRAVENOUS ONCE
Refills: 0 | Status: COMPLETED | OUTPATIENT
Start: 2019-07-20 | End: 2019-07-20

## 2019-07-20 RX ADMIN — Medication 1 TABLET(S): at 17:20

## 2019-07-20 RX ADMIN — Medication 250 MILLIGRAM(S): at 14:17

## 2019-07-20 RX ADMIN — Medication 25 MILLILITER(S): at 22:22

## 2019-07-20 RX ADMIN — TAMSULOSIN HYDROCHLORIDE 0.4 MILLIGRAM(S): 0.4 CAPSULE ORAL at 22:20

## 2019-07-20 RX ADMIN — Medication 40 MILLIGRAM(S): at 17:20

## 2019-07-20 RX ADMIN — ATORVASTATIN CALCIUM 40 MILLIGRAM(S): 80 TABLET, FILM COATED ORAL at 22:20

## 2019-07-20 RX ADMIN — HEPARIN SODIUM 5000 UNIT(S): 5000 INJECTION INTRAVENOUS; SUBCUTANEOUS at 06:05

## 2019-07-20 RX ADMIN — Medication 40 MILLIGRAM(S): at 06:05

## 2019-07-20 RX ADMIN — HEPARIN SODIUM 5000 UNIT(S): 5000 INJECTION INTRAVENOUS; SUBCUTANEOUS at 17:20

## 2019-07-20 RX ADMIN — Medication 100 MILLIGRAM(S): at 06:05

## 2019-07-20 RX ADMIN — LACTULOSE 10 GRAM(S): 10 SOLUTION ORAL at 22:20

## 2019-07-20 RX ADMIN — PANTOPRAZOLE SODIUM 40 MILLIGRAM(S): 20 TABLET, DELAYED RELEASE ORAL at 14:16

## 2019-07-20 RX ADMIN — Medication 100 MILLIGRAM(S): at 17:20

## 2019-07-20 RX ADMIN — Medication 81 MILLIGRAM(S): at 14:17

## 2019-07-20 RX ADMIN — Medication 60 MILLIGRAM(S): at 22:25

## 2019-07-20 RX ADMIN — Medication 2000 MILLILITER(S): at 14:38

## 2019-07-20 RX ADMIN — Medication 1 TABLET(S): at 06:05

## 2019-07-20 NOTE — CONSULT NOTE ADULT - SUBJECTIVE AND OBJECTIVE BOX
HPI:  64 years old male h/o ESRD brought by ems from the nursing home c/o nausea vomiting unable to tolerate orally. (18 Jul 2019 23:21). He was recently hospitalized with similar complaints. Colonoscopy and EGD performed on last admission showed gastritis and poor bowel prep despite 2 days of bowel prep. He denies abdominal pain and has no risk factors for acute pancreatitis. His last bowel movement was 5 days ago      REVIEW OF SYSTEMS      General:	Constipation    Skin/Breast:  	  Ophthalmologic:  	  ENMT:	    Respiratory and Thorax: normal  	  Cardiovascular:	normal    Gastrointestinal:	    Genitourinary:	on dialysis    Musculoskeletal:	    Neurological:	    Psychiatric:	    Hematology/Lymphatics:	    Endocrine:	    Allergic/Immunologic:	    MEDICATIONS  (STANDING):  aspirin enteric coated 81 milliGRAM(s) Oral daily  atorvastatin 40 milliGRAM(s) Oral at bedtime  ciprofloxacin     Tablet 250 milliGRAM(s) Oral daily  dextrose 5%. 1000 milliLiter(s) (50 mL/Hr) IV Continuous <Continuous>  dextrose 50% Injectable 12.5 Gram(s) IV Push once  dextrose 50% Injectable 25 Gram(s) IV Push once  dextrose 50% Injectable 25 Gram(s) IV Push once  furosemide    Tablet 40 milliGRAM(s) Oral two times a day  heparin  Injectable 5000 Unit(s) SubCutaneous every 12 hours  labetalol 100 milliGRAM(s) Oral two times a day  lactobacillus acidophilus 1 Tablet(s) Oral two times a day  lactulose Syrup 10 Gram(s) Oral at bedtime  NIFEdipine XL 60 milliGRAM(s) Oral at bedtime  pantoprazole  Injectable 40 milliGRAM(s) IV Push daily  tamsulosin 0.4 milliGRAM(s) Oral at bedtime    MEDICATIONS  (PRN):  chlorproMAZINE    Tablet 25 milliGRAM(s) Oral two times a day PRN hiccoup  dextrose 40% Gel 15 Gram(s) Oral once PRN Blood Glucose LESS THAN 70 milliGRAM(s)/deciliter  glucagon  Injectable 1 milliGRAM(s) IntraMuscular once PRN Glucose LESS THAN 70 milligrams/deciliter  ondansetron Injectable 4 milliGRAM(s) IV Push every 6 hours PRN Nausea and/or Vomiting  oxyCODONE    5 mG/acetaminophen 325 mG 1 Tablet(s) Oral every 4 hours PRN Severe Pain (7 - 10)      Vital Signs Last 24 Hrs  T(C): 36.6 (20 Jul 2019 13:23), Max: 36.7 (19 Jul 2019 17:37)  T(F): 97.8 (20 Jul 2019 13:23), Max: 98.1 (20 Jul 2019 08:30)  HR: 61 (20 Jul 2019 13:23) (57 - 64)  BP: 136/65 (20 Jul 2019 13:23) (88/42 - 204/93)  BP(mean): --  RR: 18 (20 Jul 2019 13:23) (18 - 20)  SpO2: 98% (20 Jul 2019 13:23) (97% - 100%)    PHYSICAL EXAM:      Constitutional:    Eyes: no jaundice    ENMT:    Neck: supple    Breasts:    Back:    Respiratory: normal    Cardiovascular: normal    Gastrointestinal: no mass    Genitourinary:    Rectal:    Extremities: s/p BKA    Vascular:    Neurological:    Skin:    Lymph Nodes:    Musculoskeletal:    Psychiatric:            HEALTH ISSUES - PROBLEM Dx:  Hiccough: Hiccough  Hypertension secondary to other renal disorders: Hypertension secondary to other renal disorders  Colitis: Colitis  ESRD on hemodialysis: ESRD on hemodialysis  Acute pancreatitis, unspecified complication status, unspecified pancreatitis type: Acute pancreatitis, unspecified complication status, unspecified pancreatitis type            Assesment & Plan: Chronic constipation. Does not meet criteria for pancreatitis. Bowel cleansing with enemas and Moviprep recommended

## 2019-07-20 NOTE — PROGRESS NOTE ADULT - ASSESSMENT
65 yo M h/o ESRD, DM and HTN presented to the ER with diffuse abd pain, worsened after dialysis started yesterday. Pt denies sob, palpitation and fever. Feels better at present admitted for colitis    A/P:  ESRD:  HD MWF  HD yestyerday.  Renal diet  Consent in the chart  Vascular Dr. 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

## 2019-07-21 DIAGNOSIS — K56.41 FECAL IMPACTION: ICD-10-CM

## 2019-07-21 LAB
GLUCOSE BLDC GLUCOMTR-MCNC: 117 MG/DL — HIGH (ref 70–99)
GLUCOSE BLDC GLUCOMTR-MCNC: 73 MG/DL — SIGNIFICANT CHANGE UP (ref 70–99)
GLUCOSE BLDC GLUCOMTR-MCNC: 84 MG/DL — SIGNIFICANT CHANGE UP (ref 70–99)
GLUCOSE BLDC GLUCOMTR-MCNC: 99 MG/DL — SIGNIFICANT CHANGE UP (ref 70–99)

## 2019-07-21 RX ADMIN — Medication 1 TABLET(S): at 05:38

## 2019-07-21 RX ADMIN — Medication 100 MILLIGRAM(S): at 05:45

## 2019-07-21 RX ADMIN — Medication 1 TABLET(S): at 19:25

## 2019-07-21 RX ADMIN — ATORVASTATIN CALCIUM 40 MILLIGRAM(S): 80 TABLET, FILM COATED ORAL at 21:57

## 2019-07-21 RX ADMIN — HEPARIN SODIUM 5000 UNIT(S): 5000 INJECTION INTRAVENOUS; SUBCUTANEOUS at 19:25

## 2019-07-21 RX ADMIN — Medication 60 MILLIGRAM(S): at 21:58

## 2019-07-21 RX ADMIN — Medication 40 MILLIGRAM(S): at 19:25

## 2019-07-21 RX ADMIN — TAMSULOSIN HYDROCHLORIDE 0.4 MILLIGRAM(S): 0.4 CAPSULE ORAL at 21:59

## 2019-07-21 RX ADMIN — Medication 40 MILLIGRAM(S): at 05:38

## 2019-07-21 RX ADMIN — PANTOPRAZOLE SODIUM 40 MILLIGRAM(S): 20 TABLET, DELAYED RELEASE ORAL at 12:31

## 2019-07-21 RX ADMIN — Medication 100 MILLIGRAM(S): at 19:25

## 2019-07-21 RX ADMIN — HEPARIN SODIUM 5000 UNIT(S): 5000 INJECTION INTRAVENOUS; SUBCUTANEOUS at 05:38

## 2019-07-21 RX ADMIN — LACTULOSE 10 GRAM(S): 10 SOLUTION ORAL at 21:59

## 2019-07-21 RX ADMIN — Medication 81 MILLIGRAM(S): at 12:31

## 2019-07-21 RX ADMIN — Medication 250 MILLIGRAM(S): at 12:31

## 2019-07-21 NOTE — PROGRESS NOTE ADULT - SUBJECTIVE AND OBJECTIVE BOX
HPI:  64 years old male h/o ESRD brought by ems from the nursing home c/o nausea vomiting unable to tolerate orally. (18 Jul 2019 23:21)  Pt feeling better following bowel cleansing with Moviprep. He took half of recommended dose and has been advised to complete treatment.    REVIEW OF SYSTEMS unremarkable      General:	    Skin/Breast:  	  Ophthalmologic:  	  ENMT:	    Respiratory and Thorax: normal  	  Cardiovascular:	normal    Gastrointestinal:	normal    Genitourinary:	    Musculoskeletal:	    Neurological:	    Psychiatric:	    Hematology/Lymphatics:	    Endocrine:	    Allergic/Immunologic:	    MEDICATIONS  (STANDING):  aspirin enteric coated 81 milliGRAM(s) Oral daily  atorvastatin 40 milliGRAM(s) Oral at bedtime  ciprofloxacin     Tablet 250 milliGRAM(s) Oral daily  dextrose 5%. 1000 milliLiter(s) (50 mL/Hr) IV Continuous <Continuous>  dextrose 50% Injectable 12.5 Gram(s) IV Push once  dextrose 50% Injectable 25 Gram(s) IV Push once  dextrose 50% Injectable 25 Gram(s) IV Push once  furosemide    Tablet 40 milliGRAM(s) Oral two times a day  heparin  Injectable 5000 Unit(s) SubCutaneous every 12 hours  labetalol 100 milliGRAM(s) Oral two times a day  lactobacillus acidophilus 1 Tablet(s) Oral two times a day  lactulose Syrup 10 Gram(s) Oral at bedtime  NIFEdipine XL 60 milliGRAM(s) Oral at bedtime  pantoprazole  Injectable 40 milliGRAM(s) IV Push daily  tamsulosin 0.4 milliGRAM(s) Oral at bedtime    MEDICATIONS  (PRN):  chlorproMAZINE    Tablet 25 milliGRAM(s) Oral two times a day PRN hiccoup  dextrose 40% Gel 15 Gram(s) Oral once PRN Blood Glucose LESS THAN 70 milliGRAM(s)/deciliter  glucagon  Injectable 1 milliGRAM(s) IntraMuscular once PRN Glucose LESS THAN 70 milligrams/deciliter  ondansetron Injectable 4 milliGRAM(s) IV Push every 6 hours PRN Nausea and/or Vomiting  oxyCODONE    5 mG/acetaminophen 325 mG 1 Tablet(s) Oral every 4 hours PRN Severe Pain (7 - 10)      Vital Signs Last 24 Hrs  T(C): 36.6 (21 Jul 2019 11:52), Max: 37.1 (20 Jul 2019 17:14)  T(F): 97.9 (21 Jul 2019 11:52), Max: 98.8 (20 Jul 2019 17:14)  HR: 64 (21 Jul 2019 11:52) (58 - 65)  BP: 113/56 (21 Jul 2019 11:52) (113/56 - 170/69)  BP(mean): --  RR: 17 (21 Jul 2019 11:52) (17 - 18)  SpO2: 98% (21 Jul 2019 11:52) (96% - 100%)    PHYSICAL EXAM:      Constitutional:    Eyes:    ENMT:    Neck: supple    Breasts:    Back:    Respiratory: normal    Cardiovascular: normal    Gastrointestinal: normal    Genitourinary:    Rectal: no mass    Extremities:    Vascular:    Neurological:    Skin:    Lymph Nodes:    Musculoskeletal:    Psychiatric:            HEALTH ISSUES - PROBLEM Dx:  Fecal impaction of colon: Fecal impaction of colon  Hiccough: Hiccough  Hypertension secondary to other renal disorders: Hypertension secondary to other renal disorders  Colitis: Colitis  ESRD on hemodialysis: ESRD on hemodialysis  Acute pancreatitis, unspecified complication status, unspecified pancreatitis type: Acute pancreatitis, unspecified complication status, unspecified pancreatitis type            Assesment & Plan: Chronic constipation. Linzess/ Amitiza on discharge
JAMES PATRICK  64y  Patient is a 64y old  Male who presents with a chief complaint of Nausea and vomiting (2019 13:43)    HPI:  Feels better today. HD yesterday.     HEALTH ISSUES - PROBLEM Dx:  Hiccough: Hiccough  Hypertension secondary to other renal disorders: Hypertension secondary to other renal disorders  Colitis: Colitis  ESRD on hemodialysis: ESRD on hemodialysis  Acute pancreatitis, unspecified complication status, unspecified pancreatitis type: Acute pancreatitis, unspecified complication status, unspecified pancreatitis type        MEDICATIONS  (STANDING):  aspirin enteric coated 81 milliGRAM(s) Oral daily  atorvastatin 40 milliGRAM(s) Oral at bedtime  ciprofloxacin     Tablet 250 milliGRAM(s) Oral daily  dextrose 5%. 1000 milliLiter(s) (50 mL/Hr) IV Continuous <Continuous>  dextrose 50% Injectable 12.5 Gram(s) IV Push once  dextrose 50% Injectable 25 Gram(s) IV Push once  dextrose 50% Injectable 25 Gram(s) IV Push once  furosemide    Tablet 40 milliGRAM(s) Oral two times a day  heparin  Injectable 5000 Unit(s) SubCutaneous every 12 hours  labetalol 100 milliGRAM(s) Oral two times a day  lactobacillus acidophilus 1 Tablet(s) Oral two times a day  lactulose Syrup 10 Gram(s) Oral at bedtime  NIFEdipine XL 60 milliGRAM(s) Oral at bedtime  pantoprazole  Injectable 40 milliGRAM(s) IV Push daily  tamsulosin 0.4 milliGRAM(s) Oral at bedtime    MEDICATIONS  (PRN):  chlorproMAZINE    Tablet 25 milliGRAM(s) Oral two times a day PRN hiccoup  dextrose 40% Gel 15 Gram(s) Oral once PRN Blood Glucose LESS THAN 70 milliGRAM(s)/deciliter  glucagon  Injectable 1 milliGRAM(s) IntraMuscular once PRN Glucose LESS THAN 70 milligrams/deciliter  ondansetron Injectable 4 milliGRAM(s) IV Push every 6 hours PRN Nausea and/or Vomiting  oxyCODONE    5 mG/acetaminophen 325 mG 1 Tablet(s) Oral every 4 hours PRN Severe Pain (7 - 10)    Vital Signs Last 24 Hrs  T(C): 37.1 (2019 17:14), Max: 37.1 (2019 17:14)  T(F): 98.8 (2019 17:14), Max: 98.8 (2019 17:14)  HR: 60 (2019 17:14) (57 - 62)  BP: 152/76 (2019 17:14) (100/48 - 156/77)  BP(mean): --  RR: 18 (2019 17:14) (18 - 18)  SpO2: 96% (2019 17:14) (96% - 100%)  Daily     Daily Weight in k (2019 12:35)    PHYSICAL EXAM:  Constitutional: He  appears comfortable and not distressed. Not diaphoretic.    Neck:  The thyroid is normal. Trachea is midline.     Breasts: Normal examination.    Respiratory: The lungs are clear to auscultation. No dullness and expansion is normal.    Cardiovascular: S1 and S2 are normal. No mummurs, rubs or gallops are present.    Gastrointestinal: The abdomen is soft. No tenderness is present. No masses are present. Bowel sounds are normal.    Genitourinary: The bladder is not distended. No CVA tenderness is present.    Extremities: No edema is noted. Bilateral BKA.    Neurological: Cognition is normal. Tone, power and sensation are normal. Gait is steady.                            12.4   4.21  )-----------( 223      ( 2019 08:38 )             40.2     07-19    137  |  100  |  65<H>  ----------------------------<  56<L>  4.9   |  28  |  3.75<H>    Ca    9.2      2019 08:38
Patient is a 64y old  Male who presents with a chief complaint of     INTERVAL HPI/OVERNIGHT EVENTS:  pt is doing better  MEDICATIONS  (STANDING):  aspirin enteric coated 81 milliGRAM(s) Oral daily  atorvastatin 40 milliGRAM(s) Oral at bedtime  ciprofloxacin     Tablet 250 milliGRAM(s) Oral daily  dextrose 5%. 1000 milliLiter(s) (50 mL/Hr) IV Continuous <Continuous>  dextrose 50% Injectable 12.5 Gram(s) IV Push once  dextrose 50% Injectable 25 Gram(s) IV Push once  dextrose 50% Injectable 25 Gram(s) IV Push once  furosemide    Tablet 40 milliGRAM(s) Oral two times a day  heparin  Injectable 5000 Unit(s) SubCutaneous every 12 hours  labetalol 100 milliGRAM(s) Oral two times a day  lactobacillus acidophilus 1 Tablet(s) Oral two times a day  lactulose Syrup 10 Gram(s) Oral at bedtime  NIFEdipine XL 60 milliGRAM(s) Oral at bedtime  pantoprazole  Injectable 40 milliGRAM(s) IV Push daily  tamsulosin 0.4 milliGRAM(s) Oral at bedtime    MEDICATIONS  (PRN):  chlorproMAZINE    Tablet 25 milliGRAM(s) Oral two times a day PRN hiccoup  dextrose 40% Gel 15 Gram(s) Oral once PRN Blood Glucose LESS THAN 70 milliGRAM(s)/deciliter  glucagon  Injectable 1 milliGRAM(s) IntraMuscular once PRN Glucose LESS THAN 70 milligrams/deciliter  ondansetron Injectable 4 milliGRAM(s) IV Push every 6 hours PRN Nausea and/or Vomiting  oxyCODONE    5 mG/acetaminophen 325 mG 1 Tablet(s) Oral every 4 hours PRN Severe Pain (7 - 10)      Allergies    No Known Allergies    Intolerances        REVIEW OF SYSTEMS:  CONSTITUTIONAL: No fever, weight loss, or fatigue  EYES: No eye pain, visual disturbances, or discharge  ENMT:  No difficulty hearing, tinnitus, vertigo; No sinus or throat pain  NECK: No pain or stiffness  BREASTS: No pain, masses, or nipple discharge  RESPIRATORY: No cough, wheezing, chills or hemoptysis; No shortness of breath  CARDIOVASCULAR: No chest pain, palpitations, dizziness, or leg swelling  GASTROINTESTINAL: No abdominal or epigastric pain. No nausea, vomiting, or hematemesis; No diarrhea or constipation. No melena or hematochezia.  GENITOURINARY: No dysuria, frequency, hematuria, or incontinence  NEUROLOGICAL: No headaches, memory loss, loss of strength, numbness, or tremors  SKIN: No itching, burning, rashes, or lesions   LYMPH NODES: No enlarged glands  ENDOCRINE: No heat or cold intolerance; No hair loss  MUSCULOSKELETAL: No joint pain or swelling; No muscle, back, or extremity pain  PSYCHIATRIC: No depression, anxiety, mood swings, or difficulty sleeping  HEME/LYMPH: No easy bruising, or bleeding gums  ALLERGY AND IMMUNOLOGIC: No hives or eczema    Vital Signs Last 24 Hrs  T(C): 36.6 (20 Jul 2019 09:30), Max: 36.7 (19 Jul 2019 11:12)  T(F): 97.9 (20 Jul 2019 09:30), Max: 98.1 (20 Jul 2019 08:30)  HR: 57 (20 Jul 2019 09:30) (57 - 64)  BP: 156/77 (20 Jul 2019 09:30) (88/42 - 204/93)  BP(mean): --  RR: 18 (20 Jul 2019 09:30) (17 - 20)  SpO2: 100% (20 Jul 2019 09:30) (97% - 100%)    PHYSICAL EXAM:  GENERAL: NAD, well-groomed, well-developed  HEAD:  Atraumatic, Normocephalic  EYES: EOMI, PERRLA, conjunctiva and sclera clear  ENMT: No tonsillar erythema, exudates, or enlargement; Moist mucous membranes, Good dentition, No lesions  NECK: Supple, No JVD, Normal thyroid  NERVOUS SYSTEM:  Alert & Oriented X3, Good concentration; Motor Strength 5/5 B/L upper and lower extremities; DTRs 2+ intact and symmetric  CHEST/LUNG: Clear to percussion bilaterally; No rales, rhonchi, wheezing, or rubs  HEART: Regular rate and rhythm; No murmurs, rubs, or gallops  ABDOMEN: Soft, Nontender, Nondistended; Bowel sounds present  EXTREMITIES:  2+ Peripheral Pulses, No clubbing, cyanosis, or edema  LYMPH: No lymphadenopathy noted  SKIN: No rashes or lesions    LABS:                        12.4   4.21  )-----------( 223      ( 19 Jul 2019 08:38 )             40.2     07-19    137  |  100  |  65<H>  ----------------------------<  56<L>  4.9   |  28  |  3.75<H>    Ca    9.2      19 Jul 2019 08:38    TPro  7.6  /  Alb  2.7<L>  /  TBili  0.2  /  DBili  x   /  AST  52<H>  /  ALT  80<H>  /  AlkPhos  126<H>  07-18    PT/INR - ( 18 Jul 2019 18:33 )   PT: 10.4 sec;   INR: 0.93 ratio         PTT - ( 18 Jul 2019 18:33 )  PTT:30.0 sec    CAPILLARY BLOOD GLUCOSE      POCT Blood Glucose.: 90 mg/dL (20 Jul 2019 07:46)  POCT Blood Glucose.: 95 mg/dL (19 Jul 2019 22:58)  POCT Blood Glucose.: 75 mg/dL (19 Jul 2019 16:29)  POCT Blood Glucose.: 80 mg/dL (19 Jul 2019 11:52)    CULTURES:    HEMOGLOBIN A1C:    CHOLESTEROL:        RADIOLOGY & ADDITIONAL TESTS:
Patient is a 64y old  Male who presents with a chief complaint of     INTERVAL HPI/OVERNIGHT EVENTS:  pt is having hiccoup  MEDICATIONS  (STANDING):  aspirin enteric coated 81 milliGRAM(s) Oral daily  atorvastatin 40 milliGRAM(s) Oral at bedtime  ciprofloxacin     Tablet 250 milliGRAM(s) Oral daily  dextrose 5%. 1000 milliLiter(s) (50 mL/Hr) IV Continuous <Continuous>  dextrose 50% Injectable 12.5 Gram(s) IV Push once  dextrose 50% Injectable 25 Gram(s) IV Push once  dextrose 50% Injectable 25 Gram(s) IV Push once  furosemide    Tablet 40 milliGRAM(s) Oral two times a day  heparin  Injectable 5000 Unit(s) SubCutaneous every 12 hours  labetalol 100 milliGRAM(s) Oral two times a day  lactobacillus acidophilus 1 Tablet(s) Oral two times a day  lactulose Syrup 10 Gram(s) Oral at bedtime  NIFEdipine XL 90 milliGRAM(s) Oral at bedtime  pantoprazole  Injectable 40 milliGRAM(s) IV Push daily  tamsulosin 0.4 milliGRAM(s) Oral at bedtime    MEDICATIONS  (PRN):  dextrose 40% Gel 15 Gram(s) Oral once PRN Blood Glucose LESS THAN 70 milliGRAM(s)/deciliter  glucagon  Injectable 1 milliGRAM(s) IntraMuscular once PRN Glucose LESS THAN 70 milligrams/deciliter  ondansetron Injectable 4 milliGRAM(s) IV Push every 6 hours PRN Nausea and/or Vomiting  oxyCODONE    5 mG/acetaminophen 325 mG 1 Tablet(s) Oral every 4 hours PRN Severe Pain (7 - 10)      Allergies    No Known Allergies    Intolerances        REVIEW OF SYSTEMS:  CONSTITUTIONAL: No fever, weight loss, or fatigue  EYES: No eye pain, visual disturbances, or discharge  ENMT:  No difficulty hearing, tinnitus, vertigo; No sinus or throat pain  NECK: No pain or stiffness  BREASTS: No pain, masses, or nipple discharge  RESPIRATORY: No cough, wheezing, chills or hemoptysis; No shortness of breath  CARDIOVASCULAR: No chest pain, palpitations, dizziness, or leg swelling  GASTROINTESTINAL: No abdominal or epigastric pain. No nausea, vomiting, or hematemesis; No diarrhea or constipation. No melena or hematochezia.  GENITOURINARY: No dysuria, frequency, hematuria, or incontinence  NEUROLOGICAL: No headaches, memory loss, loss of strength, numbness, or tremors  SKIN: No itching, burning, rashes, or lesions   LYMPH NODES: No enlarged glands  ENDOCRINE: No heat or cold intolerance; No hair loss  MUSCULOSKELETAL: No joint pain or swelling; No muscle, back, or extremity pain  PSYCHIATRIC: No depression, anxiety, mood swings, or difficulty sleeping  HEME/LYMPH: No easy bruising, or bleeding gums  ALLERGY AND IMMUNOLOGIC: No hives or eczema    Vital Signs Last 24 Hrs  T(C): 36.7 (19 Jul 2019 05:38), Max: 36.7 (18 Jul 2019 16:42)  T(F): 98 (19 Jul 2019 05:38), Max: 98.1 (18 Jul 2019 16:42)  HR: 61 (19 Jul 2019 05:38) (58 - 64)  BP: 131/62 (19 Jul 2019 05:38) (131/62 - 186/90)  BP(mean): --  RR: 18 (19 Jul 2019 05:38) (17 - 20)  SpO2: 98% (19 Jul 2019 05:38) (95% - 98%)    PHYSICAL EXAM:  GENERAL: NAD, well-groomed, well-developed  HEAD:  Atraumatic, Normocephalic  EYES: EOMI, PERRLA, conjunctiva and sclera clear  ENMT: No tonsillar erythema, exudates, or enlargement; Moist mucous membranes, Good dentition, No lesions  NECK: Supple, No JVD, Normal thyroid  NERVOUS SYSTEM:  Alert & Oriented X3, Good concentration; Motor Strength 5/5 B/L upper and lower extremities; DTRs 2+ intact and symmetric  CHEST/LUNG: Clear to percussion bilaterally; No rales, rhonchi, wheezing, or rubs  HEART: Regular rate and rhythm; No murmurs, rubs, or gallops  ABDOMEN: Soft, Nontender, Nondistended; Bowel sounds present  EXTREMITIES:  2+ Peripheral Pulses, No clubbing, cyanosis, or edema  LYMPH: No lymphadenopathy noted  SKIN: No rashes or lesions    LABS:                        12.4   4.21  )-----------( 223      ( 19 Jul 2019 08:38 )             40.2     07-19    137  |  100  |  65<H>  ----------------------------<  56<L>  4.9   |  28  |  3.75<H>    Ca    9.2      19 Jul 2019 08:38    TPro  7.6  /  Alb  2.7<L>  /  TBili  0.2  /  DBili  x   /  AST  52<H>  /  ALT  80<H>  /  AlkPhos  126<H>  07-18    PT/INR - ( 18 Jul 2019 18:33 )   PT: 10.4 sec;   INR: 0.93 ratio         PTT - ( 18 Jul 2019 18:33 )  PTT:30.0 sec    CAPILLARY BLOOD GLUCOSE      POCT Blood Glucose.: 67 mg/dL (19 Jul 2019 08:13)    CULTURES:    HEMOGLOBIN A1C:  Hemoglobin A1C, Whole Blood: 4.2 % (07-19-19 @ 09:56)    CHOLESTEROL:        RADIOLOGY & ADDITIONAL TESTS:
Patient is a 64y old  Male who presents with a chief complaint of Nausea and vomiting (20 Jul 2019 13:43)      INTERVAL HPI/OVERNIGHT EVENTS:  pt stated he had a good BM and feeling better  MEDICATIONS  (STANDING):  aspirin enteric coated 81 milliGRAM(s) Oral daily  atorvastatin 40 milliGRAM(s) Oral at bedtime  ciprofloxacin     Tablet 250 milliGRAM(s) Oral daily  dextrose 5%. 1000 milliLiter(s) (50 mL/Hr) IV Continuous <Continuous>  dextrose 50% Injectable 12.5 Gram(s) IV Push once  dextrose 50% Injectable 25 Gram(s) IV Push once  dextrose 50% Injectable 25 Gram(s) IV Push once  furosemide    Tablet 40 milliGRAM(s) Oral two times a day  heparin  Injectable 5000 Unit(s) SubCutaneous every 12 hours  labetalol 100 milliGRAM(s) Oral two times a day  lactobacillus acidophilus 1 Tablet(s) Oral two times a day  lactulose Syrup 10 Gram(s) Oral at bedtime  NIFEdipine XL 60 milliGRAM(s) Oral at bedtime  pantoprazole  Injectable 40 milliGRAM(s) IV Push daily  tamsulosin 0.4 milliGRAM(s) Oral at bedtime    MEDICATIONS  (PRN):  chlorproMAZINE    Tablet 25 milliGRAM(s) Oral two times a day PRN hiccoup  dextrose 40% Gel 15 Gram(s) Oral once PRN Blood Glucose LESS THAN 70 milliGRAM(s)/deciliter  glucagon  Injectable 1 milliGRAM(s) IntraMuscular once PRN Glucose LESS THAN 70 milligrams/deciliter  ondansetron Injectable 4 milliGRAM(s) IV Push every 6 hours PRN Nausea and/or Vomiting  oxyCODONE    5 mG/acetaminophen 325 mG 1 Tablet(s) Oral every 4 hours PRN Severe Pain (7 - 10)      Allergies    No Known Allergies    Intolerances        REVIEW OF SYSTEMS:  CONSTITUTIONAL: No fever, weight loss, or fatigue  EYES: No eye pain, visual disturbances, or discharge  ENMT:  No difficulty hearing, tinnitus, vertigo; No sinus or throat pain  NECK: No pain or stiffness  BREASTS: No pain, masses, or nipple discharge  RESPIRATORY: No cough, wheezing, chills or hemoptysis; No shortness of breath  CARDIOVASCULAR: No chest pain, palpitations, dizziness, or leg swelling  GASTROINTESTINAL: No abdominal or epigastric pain. No nausea, vomiting, or hematemesis; No diarrhea or constipation. No melena or hematochezia.  GENITOURINARY: No dysuria, frequency, hematuria, or incontinence  NEUROLOGICAL: No headaches, memory loss, loss of strength, numbness, or tremors  SKIN: No itching, burning, rashes, or lesions   LYMPH NODES: No enlarged glands  ENDOCRINE: No heat or cold intolerance; No hair loss  MUSCULOSKELETAL: No joint pain or swelling; No muscle, back, or extremity pain  PSYCHIATRIC: No depression, anxiety, mood swings, or difficulty sleeping  HEME/LYMPH: No easy bruising, or bleeding gums  ALLERGY AND IMMUNOLOGIC: No hives or eczema    Vital Signs Last 24 Hrs  T(C): 36.1 (21 Jul 2019 05:20), Max: 37.1 (20 Jul 2019 17:14)  T(F): 97 (21 Jul 2019 05:20), Max: 98.8 (20 Jul 2019 17:14)  HR: 65 (21 Jul 2019 05:43) (58 - 65)  BP: 135/65 (21 Jul 2019 05:43) (128/72 - 170/69)  BP(mean): --  RR: 18 (21 Jul 2019 05:20) (18 - 18)  SpO2: 97% (21 Jul 2019 05:20) (96% - 100%)    PHYSICAL EXAM:  GENERAL: NAD, well-groomed, well-developed  HEAD:  Atraumatic, Normocephalic  EYES: EOMI, PERRLA, conjunctiva and sclera clear  ENMT: No tonsillar erythema, exudates, or enlargement; Moist mucous membranes, Good dentition, No lesions  NECK: Supple, No JVD, Normal thyroid  NERVOUS SYSTEM:  Alert & Oriented X3, Good concentration; Motor Strength 5/5 B/L upper and lower extremities; DTRs 2+ intact and symmetric  CHEST/LUNG: Clear to percussion bilaterally; No rales, rhonchi, wheezing, or rubs  HEART: Regular rate and rhythm; No murmurs, rubs, or gallops  ABDOMEN: Soft, Nontender, Nondistended; Bowel sounds present  EXTREMITIES:  2+ Peripheral Pulses, No clubbing, cyanosis, or edema  LYMPH: No lymphadenopathy noted  SKIN: No rashes or lesions    LABS:              CAPILLARY BLOOD GLUCOSE      POCT Blood Glucose.: 73 mg/dL (21 Jul 2019 07:59)  POCT Blood Glucose.: 117 mg/dL (20 Jul 2019 23:47)  POCT Blood Glucose.: 60 mg/dL (20 Jul 2019 21:56)  POCT Blood Glucose.: 67 mg/dL (20 Jul 2019 21:53)  POCT Blood Glucose.: 76 mg/dL (20 Jul 2019 16:15)    CULTURES:    HEMOGLOBIN A1C:    CHOLESTEROL:        RADIOLOGY & ADDITIONAL TESTS:

## 2019-07-22 ENCOUNTER — TRANSCRIPTION ENCOUNTER (OUTPATIENT)
Age: 64
End: 2019-07-22

## 2019-07-22 VITALS
RESPIRATION RATE: 18 BRPM | OXYGEN SATURATION: 98 % | HEART RATE: 63 BPM | DIASTOLIC BLOOD PRESSURE: 54 MMHG | SYSTOLIC BLOOD PRESSURE: 128 MMHG

## 2019-07-22 LAB
GLUCOSE BLDC GLUCOMTR-MCNC: 104 MG/DL — HIGH (ref 70–99)
GLUCOSE BLDC GLUCOMTR-MCNC: 117 MG/DL — HIGH (ref 70–99)

## 2019-07-22 RX ORDER — LACTULOSE 10 G/15ML
30 SOLUTION ORAL
Qty: 0 | Refills: 0 | DISCHARGE
Start: 2019-07-22

## 2019-07-22 RX ORDER — LACTULOSE 10 G/15ML
15 SOLUTION ORAL
Qty: 0 | Refills: 0 | DISCHARGE
Start: 2019-07-22

## 2019-07-22 RX ORDER — PANTOPRAZOLE SODIUM 20 MG/1
1 TABLET, DELAYED RELEASE ORAL
Qty: 30 | Refills: 0
Start: 2019-07-22 | End: 2019-08-20

## 2019-07-22 RX ORDER — TAMSULOSIN HYDROCHLORIDE 0.4 MG/1
1 CAPSULE ORAL
Qty: 30 | Refills: 0
Start: 2019-07-22 | End: 2019-08-20

## 2019-07-22 RX ORDER — LACTOBACILLUS ACIDOPHILUS 100MM CELL
0 CAPSULE ORAL
Qty: 0 | Refills: 0 | DISCHARGE
Start: 2019-07-22

## 2019-07-22 RX ORDER — OXYCODONE AND ACETAMINOPHEN 5; 325 MG/1; MG/1
1 TABLET ORAL
Qty: 0 | Refills: 0 | DISCHARGE
Start: 2019-07-22

## 2019-07-22 RX ADMIN — Medication 100 MILLIGRAM(S): at 06:30

## 2019-07-22 RX ADMIN — HEPARIN SODIUM 5000 UNIT(S): 5000 INJECTION INTRAVENOUS; SUBCUTANEOUS at 06:30

## 2019-07-22 RX ADMIN — PANTOPRAZOLE SODIUM 40 MILLIGRAM(S): 20 TABLET, DELAYED RELEASE ORAL at 12:04

## 2019-07-22 RX ADMIN — Medication 1 TABLET(S): at 06:30

## 2019-07-22 RX ADMIN — Medication 250 MILLIGRAM(S): at 12:03

## 2019-07-22 RX ADMIN — Medication 81 MILLIGRAM(S): at 12:03

## 2019-07-22 RX ADMIN — Medication 1 TABLET(S): at 17:25

## 2019-07-22 RX ADMIN — Medication 40 MILLIGRAM(S): at 17:25

## 2019-07-22 RX ADMIN — HEPARIN SODIUM 5000 UNIT(S): 5000 INJECTION INTRAVENOUS; SUBCUTANEOUS at 17:25

## 2019-07-22 RX ADMIN — Medication 40 MILLIGRAM(S): at 06:30

## 2019-07-22 NOTE — DISCHARGE NOTE PROVIDER - NSDCCPCAREPLAN_GEN_ALL_CORE_FT
PRINCIPAL DISCHARGE DIAGNOSIS  Diagnosis: Acute pancreatitis, unspecified complication status, unspecified pancreatitis type  Assessment and Plan of Treatment: Acute pancreatitis, unspecified complication status, unspecified pancreatitis type      SECONDARY DISCHARGE DIAGNOSES  Diagnosis: Fecal impaction of colon  Assessment and Plan of Treatment: Fecal impaction of colon    Diagnosis: Hiccough  Assessment and Plan of Treatment: Hiccough    Diagnosis: ESRD on hemodialysis  Assessment and Plan of Treatment: ESRD on hemodialysis    Diagnosis: Hypertension secondary to other renal disorders  Assessment and Plan of Treatment: Hypertension secondary to other renal disorders    Diagnosis: ESRD on hemodialysis  Assessment and Plan of Treatment:

## 2019-07-22 NOTE — DISCHARGE NOTE NURSING/CASE MANAGEMENT/SOCIAL WORK - NSDCDPATPORTLINK_GEN_ALL_CORE
You can access the [facility] Portal, offered by [facility], by registering with the following website: [email or web address]

## 2019-07-22 NOTE — DISCHARGE NOTE PROVIDER - CARE PROVIDER_API CALL
Dillan Dorman (DO)  Internal Medicine  [address] [place] [address]  Phone: [phone]  Fax: [phone]  Follow Up Time:

## 2019-07-29 DIAGNOSIS — R10.9 UNSPECIFIED ABDOMINAL PAIN: ICD-10-CM

## 2019-07-29 DIAGNOSIS — Z79.4 LONG TERM (CURRENT) USE OF INSULIN: ICD-10-CM

## 2019-07-29 DIAGNOSIS — Z79.82 LONG TERM (CURRENT) USE OF ASPIRIN: ICD-10-CM

## 2019-07-29 DIAGNOSIS — I12.0 HYPERTENSIVE CHRONIC KIDNEY DISEASE WITH STAGE 5 CHRONIC KIDNEY DISEASE OR END STAGE RENAL DISEASE: ICD-10-CM

## 2019-07-29 DIAGNOSIS — Z79.01 LONG TERM (CURRENT) USE OF ANTICOAGULANTS: ICD-10-CM

## 2019-07-29 DIAGNOSIS — E11.22 TYPE 2 DIABETES MELLITUS WITH DIABETIC CHRONIC KIDNEY DISEASE: ICD-10-CM

## 2019-07-29 DIAGNOSIS — E11.51 TYPE 2 DIABETES MELLITUS WITH DIABETIC PERIPHERAL ANGIOPATHY WITHOUT GANGRENE: ICD-10-CM

## 2019-07-29 DIAGNOSIS — Z89.612 ACQUIRED ABSENCE OF LEFT LEG ABOVE KNEE: ICD-10-CM

## 2019-07-29 DIAGNOSIS — N25.0 RENAL OSTEODYSTROPHY: ICD-10-CM

## 2019-07-29 DIAGNOSIS — E43 UNSPECIFIED SEVERE PROTEIN-CALORIE MALNUTRITION: ICD-10-CM

## 2019-07-29 DIAGNOSIS — K56.41 FECAL IMPACTION: ICD-10-CM

## 2019-07-29 DIAGNOSIS — Z99.2 DEPENDENCE ON RENAL DIALYSIS: ICD-10-CM

## 2019-07-29 DIAGNOSIS — N18.6 END STAGE RENAL DISEASE: ICD-10-CM

## 2019-07-29 DIAGNOSIS — I70.203 UNSPECIFIED ATHEROSCLEROSIS OF NATIVE ARTERIES OF EXTREMITIES, BILATERAL LEGS: ICD-10-CM

## 2019-07-29 DIAGNOSIS — Z89.511 ACQUIRED ABSENCE OF RIGHT LEG BELOW KNEE: ICD-10-CM

## 2019-07-29 DIAGNOSIS — K52.9 NONINFECTIVE GASTROENTERITIS AND COLITIS, UNSPECIFIED: ICD-10-CM

## 2019-07-29 DIAGNOSIS — K62.89 OTHER SPECIFIED DISEASES OF ANUS AND RECTUM: ICD-10-CM

## 2019-10-23 NOTE — ED ADULT TRIAGE NOTE - CADM TRG TX PRIOR TO ARRIVAL
none likely post concussion symptoms. normal neuro exam and CT. cbc and cmp wnl. neuro consulted who rec spot eeg. will follow up results and discharge home if normal.   -MG yina pGY3 Spot EEG normal per neurology.  Will d/c home with neurology follow-up on 10/30.  BORIS Arango, PGY3

## 2019-12-04 NOTE — PROGRESS NOTE ADULT - PROBLEM SELECTOR PLAN 3
done H/h 7.0 this AM. Type and screen obtained. Consent obtained (patient unable to physically sign 2/2 to blindness. Verbal consent obtained. Will transfuse 1 unit pRBC. Send FOBT. -H/h 7.0 on 2/18/18. S/p 1 unit transfused. Now stable.  -Send FOBT.  -Iron studies in AM tomorrow, although likely 2/2 in setting of decreased EPO production d/t worsening kidney function. Hemolysis less likely. T bili is WNL.

## 2019-12-16 NOTE — DISCHARGE NOTE ADULT - NSTOBACCOREFERRAL_GEN_A_NCS
Pt is disoriented x4. Q2H check/change and reposition on bedrest. 3 large loose BM's this shift, cdiff negative. Dressing changed to coccyx, pea size white non blanchable area with pink skin surrounding. Stopped using pure wick due to sore/excoriated upper inner labia. Barrier cream applied. Unable to take HS medication or drink fluids, cannot follow directions to swallow, dozing off and on. Aspiration precautions in place.      0645- Minimal urine output over night. Bladder scan >400. Buck cath inserted with clear urine return. Pt tolerated well. >300 mL output and still draining.     Yes

## 2020-01-09 NOTE — ED ADULT NURSE NOTE - PRIMARY CARE PROVIDER
Received a call from pt's dentist that pt has dental cleaning today at 4, faxed over clearance. Dr Vallejo filled out clearance for pt's dental cleaning, states for pt to take amicar dose right before cleaning and continue every 6 hours for one day. Clearance with above info faxed to dental office. Called mom, informed her of above and to call with any issues. Mom repeated back and verbalized complete understanding, stating she does have amicar Rx at home for pt.    unknown

## 2020-01-21 NOTE — PROGRESS NOTE ADULT - SUBJECTIVE AND OBJECTIVE BOX
Hospitalist Progress Note    Subjective:   Daily Progress Note: 2020 9:54 AM    Patient is a 54 yo male with PMH of morbid obesity, anxiety, chronic and acute right lower leg wound/cellulitis with proteus/MSSA/citrobacter, coagulopathy on xaralto, history of PAF, ED, DIANA, gout, hypertension, hyperlipidemia, OA, venous insufficiency who came into the ER on 1/15   with long standing RLE chronic stasis dermatitis who is  Under the care of the wound clinic and vascular surgery. Patient was initially struck by  a rock in April of last year while mowing.  Had problems since with underlying vascular disease and chronic bilateral lower extremity lymphedema and stasis dermatitis. Patient was admitted in November for cellulitis in November,  wound culture grew MSSA/citrobacter/proteus . Patient  admitted on zosyn, vanc, ID, wound, and vascular consults.   - ID discontinue zosyn and vanc, begin cefazolin, cipro, fluconazole. - Vascular consulted recommended surgery, patient unable to comply in the past and present due to lack of funds.  Per wound care and vascular, current condition will re-occur until surgery can take place.  Vascular signed off. Subjective    Pt reports feeling okay, discussed with pt and his wife about requirement of antibiotics. Swelling and redness is improving. No fever, chills, nausea/vomiting, abdominal pain, diarrhea.       Review of Systems  A comprehensive review of systems was negative except for that written above    Objective:     Visit Vitals  /68 (BP 1 Location: Right arm, BP Patient Position: Head of bed elevated (Comment degrees); At rest)   Pulse 72   Temp 98 °F (36.7 °C)   Resp 18   Ht 6' 4\" (1.93 m)   Wt 130 kg (286 lb 9.6 oz)   SpO2 95%   BMI 34.89 kg/m²      O2 Device: Room air    Temp (24hrs), Av.2 °F (36.8 °C), Min:97.9 °F (36.6 °C), Max:98.7 °F (37.1 °C)    No intake/output data recorded.    1901 -  0700  In: 728.3 [I.V.:728.3]  Out: 950 [Urine:950]    General appearance: Morbidly obese, alert, awake, NAD  Head: Normocephalic, without obvious abnormality, atraumatic  Eyes: conjunctivae/corneas clear. PERRL  Throat: Lips, mucosa, and tongue normal. Teeth and gums normal  Neck: supple, symmetrical, trachea midline, and no JVD  Lungs: clear to auscultation bilaterally, diminished in bases  Heart: regular rate and rhythm, S1, S2 normal, no murmur, click, rub or gallop  Abdomen: Protuberant, soft, non-tender. Bowel sounds normal. No masses,  no organomegaly  Extremities: Right leg wrapped, dressing dry. No erythema above dressing.  Foot erythema, edema and heat improved.  chronic venous insufficiency changes in  LLE. Skin: Skin color, texture, turgor normal other than noted. No additional rashes or lesions  Neurologic: Grossly normal     Additional comments: Notes,orders, test results, vitals reviewed    Data Review  No results found for this or any previous visit (from the past 24 hour(s)). 1/16:  ULTRASOUND LOWER EXTREMITY;  No evidence of right lower extremity deep venous thrombosis.  Incidental note of lymphadenopathy in the right proximal thigh, may be hyperplastic or neoplastic.  Clinical correlation is recommended      1/14:  BLOOD CULTURE:  2/2:  NGTD     1/15:  BLOOD CULTURE:  2/2:  NGTD    Assessment/Plan:   Cellulitis of right leg:  Hospitalization 11/19 for same               Blood cultures 1/14 and 1/15 with NGTD              ZKSUQ care in, will follow: new boot in room, will need as OP   Needs to keep RLE elevated 30 degrees: Not done on exam, reminded patient, wife,                      nurse.  at all times  wedge in room               ID in 1/15:  antibiotics transitioned to Cipro 750 mg po bid with Duricef 500 mg po bid for 2-3 weeks.              Vascular surgery: signed off,  outpatient follow up              Monitor distal circulation               PT, OT, CM on board               SVFUIAX VPD negative for DVT               Analgesics - pain improving     Chronic venous hypertension with ulcer and inflammation involving right side under care of Vascular surgery               Underlying lymphedema               Needs surgery as above, unable to afford              PER ID:  Once cellulitis has been treated, will need strategy of edema  management with compression. On discharge will need terbinafine and wound care. Also on discharge recommends to treat  onychomycosis as a port for cellulits     PAD/PVD: Kj Sluder reinstated after negative doppler RLE                 Nausea and diarrhea: exposed to Infectious gastritis:  Improved               Stool cultures if diarrhea continues              Antiemetics          History of PAF on xaralto and cardizem              Currently in SR              Remote telemetry              Xaralto, reinstated 1/16               EF:  55%     History of gout:  Continue home allopurinol               Uric acid level WNL 1/16      DIANA:  Continue home meds     HTN:  Borderline low, hold lisinopril  Continue cardizem with parameters      Obesity, morbid      Coagulopathy on xaralto:  reinstated 1/16     Hyperglycemia:  A1C: 5.5    Discharge to home in AM with oral antibiotics.     Care Plan discussed with: Patient and Nurse      Signed By: Leatha Seip, MD     January 21, 2020 [facility] DIVISION OF KIDNEY DISEASES AND HYPERTENSION -- FOLLOW UP NOTE  --------------------------------------------------------------------------------  HPI: 63-year-old male with PMH of HTN, DM, right BKA, and CKD admitted for left foot infection. As per review of labs on [place]/[employer], Scr was 1.51 in 3/2017. As her pt. PMD's office, Scr checked in 7/2017 was 2.30.  Labs on admission (2/12) showed elevated Scr of 3.8 and increased to 5.3 today.  Pt. currently resting in bed and denies SOB, CP, or N/V.       PAST HISTORY  --------------------------------------------------------------------------------  No significant changes to PMH, PSH, FHx, SHx, unless otherwise noted    ALLERGIES & MEDICATIONS  --------------------------------------------------------------------------------  Allergies    No Known Allergies    Intolerances      Standing Inpatient Medications  ampicillin/sulbactam  IVPB 1.5 Gram(s) IV Intermittent every 12 hours  atorvastatin 40 milliGRAM(s) Oral at bedtime  cyanocobalamin 1000 MICROGram(s) Oral daily  dextrose 5%. 1000 milliLiter(s) IV Continuous <Continuous>  dextrose 50% Injectable 12.5 Gram(s) IV Push once  dextrose 50% Injectable 25 Gram(s) IV Push once  dextrose 50% Injectable 25 Gram(s) IV Push once  influenza   Vaccine 0.5 milliLiter(s) IntraMuscular once  insulin lispro (HumaLOG) corrective regimen sliding scale   SubCutaneous three times a day before meals  labetalol 300 milliGRAM(s) Oral three times a day  lactobacillus acidophilus 1 Tablet(s) Oral daily  NIFEdipine XL 30 milliGRAM(s) Oral daily  silver sulfADIAZINE 1% Cream 1 Application(s) Topical daily  sodium bicarbonate 650 milliGRAM(s) Oral three times a day    PRN Inpatient Medications  acetaminophen   Tablet 650 milliGRAM(s) Oral every 6 hours PRN  acetaminophen   Tablet. 650 milliGRAM(s) Oral every 6 hours PRN  benzonatate 100 milliGRAM(s) Oral every 6 hours PRN  dextrose Gel 1 Dose(s) Oral once PRN  glucagon  Injectable 1 milliGRAM(s) IntraMuscular once PRN  guaiFENesin   Syrup  (Sugar-Free) 100 milliGRAM(s) Oral every 6 hours PRN      REVIEW OF SYSTEMS  --------------------------------------------------------------------------------  General: no fever  CVS: no chest pain  RESP: no SOB  ABD: no abdominal pain  : no dysuria  MSK: no edema       VITALS/PHYSICAL EXAM  --------------------------------------------------------------------------------  T(C): 36.9 (02-20-18 @ 05:05), Max: 36.9 (02-19-18 @ 21:10)  HR: 74 (02-20-18 @ 05:05) (70 - 77)  BP: 152/53 (02-20-18 @ 05:05) (135/61 - 163/55)  RR: 18 (02-20-18 @ 05:05) (17 - 18)  SpO2: 95% (02-20-18 @ 05:05) (95% - 98%)  Wt(kg): --        Physical Exam:  	Gen: Resting in bed   	HEENT: No JVD  	Pulm: CTA B/L  	CV: S1S2+  	Abd:  soft  	LE: Right BKA, left foot dressing seen   	Neuro: Awake and alert   	Psych: Normal affect and mood  	Skin: Warm    LABS/STUDIES  --------------------------------------------------------------------------------              7.8    9.55  >-----------<  259      [02-20-18 @ 05:00]              23.8     142  |  105  |  63  ----------------------------<  95      [02-20-18 @ 05:00]  4.3   |  16  |  5.30        Ca     8.1     [02-20-18 @ 05:00]    TPro  7.0  /  Alb  3.0  /  TBili  0.2  /  DBili  x   /  AST  45  /  ALT  52  /  AlkPhos  101  [02-20-18 @ 05:00]    PT/INR: PT 12.6 , INR 1.13       [02-20-18 @ 05:00]  PTT: 31.2       [02-19-18 @ 05:00]      Creatinine Trend:  SCr 5.30 [02-20 @ 05:00]  SCr 5.08 [02-19 @ 05:00]  SCr 4.74 [02-18 @ 06:15]  SCr 4.80 [02-17 @ 07:00]  SCr 4.56 [02-16 @ 06:30] [facility] DIVISION OF KIDNEY DISEASES AND HYPERTENSION -- FOLLOW UP NOTE  --------------------------------------------------------------------------------  HPI: 63-year-old male with PMH of HTN, DM, right BKA, and CKD admitted for left foot infection. As per review of labs on [place]/[employer], Scr was 1.51 in 3/2017. As her pt. PMD's office, Scr checked in 7/2017 was 2.30.  Labs on admission (2/12) showed elevated Scr of 3.8 and increased to 5.3 today.  Pt. currently resting in bed and denies SOB, CP, or N/V.     PAST HISTORY  --------------------------------------------------------------------------------  No significant changes to PMH, PSH, FHx, SHx, unless otherwise noted    ALLERGIES & MEDICATIONS  --------------------------------------------------------------------------------  Allergies    No Known Allergies    Intolerances    Standing Inpatient Medications  ampicillin/sulbactam  IVPB 1.5 Gram(s) IV Intermittent every 12 hours  atorvastatin 40 milliGRAM(s) Oral at bedtime  cyanocobalamin 1000 MICROGram(s) Oral daily  dextrose 5%. 1000 milliLiter(s) IV Continuous <Continuous>  dextrose 50% Injectable 12.5 Gram(s) IV Push once  dextrose 50% Injectable 25 Gram(s) IV Push once  dextrose 50% Injectable 25 Gram(s) IV Push once  influenza   Vaccine 0.5 milliLiter(s) IntraMuscular once  insulin lispro (HumaLOG) corrective regimen sliding scale   SubCutaneous three times a day before meals  labetalol 300 milliGRAM(s) Oral three times a day  lactobacillus acidophilus 1 Tablet(s) Oral daily  NIFEdipine XL 30 milliGRAM(s) Oral daily  silver sulfADIAZINE 1% Cream 1 Application(s) Topical daily  sodium bicarbonate 650 milliGRAM(s) Oral three times a day    REVIEW OF SYSTEMS  --------------------------------------------------------------------------------  General: no fever  CVS: no chest pain  RESP: no SOB  ABD: no abdominal pain  : no dysuria  MSK: no edema     VITALS/PHYSICAL EXAM  --------------------------------------------------------------------------------  T(C): 36.9 (02-20-18 @ 05:05), Max: 36.9 (02-19-18 @ 21:10)  HR: 74 (02-20-18 @ 05:05) (70 - 77)  BP: 152/53 (02-20-18 @ 05:05) (135/61 - 163/55)  RR: 18 (02-20-18 @ 05:05) (17 - 18)  SpO2: 95% (02-20-18 @ 05:05) (95% - 98%)  Wt(kg): --    Physical Exam:  	Gen: Resting in bed   	HEENT: No JVD  	Pulm: CTA B/L  	CV: S1S2+  	Abd:  soft  	LE: Right BKA, left foot dressing seen   	Neuro: Awake and alert   	Psych: Normal affect and mood  	Skin: Warm    LABS/STUDIES  --------------------------------------------------------------------------------              7.8    9.55  >-----------<  259      [02-20-18 @ 05:00]              23.8     142  |  105  |  63  ----------------------------<  95      [02-20-18 @ 05:00]  4.3   |  16  |  5.30        Ca     8.1     [02-20-18 @ 05:00]    TPro  7.0  /  Alb  3.0  /  TBili  0.2  /  DBili  x   /  AST  45  /  ALT  52  /  AlkPhos  101  [02-20-18 @ 05:00]    Creatinine Trend:  SCr 5.30 [02-20 @ 05:00]  SCr 5.08 [02-19 @ 05:00]  SCr 4.74 [02-18 @ 06:15]  SCr 4.80 [02-17 @ 07:00]  SCr 4.56 [02-16 @ 06:30]

## 2020-03-06 NOTE — PROGRESS NOTE ADULT - PROVIDER SPECIALTY LIST ADULT
Infectious Disease That is the function of lasix, to offload fluid.  That means it is doing its job.  As equilibrium is reached, this may taper off somewhat.  HARRY ABDULLAHI

## 2020-03-18 NOTE — PROGRESS NOTE ADULT - SUBJECTIVE AND OBJECTIVE BOX
Yes Patient is a 63y old  Male who presents with a chief complaint of 63M PMH DM, HTN, CKD p/w L foot pain x 3 days. (19 Feb 2018 10:39)      OVERNIGHT EVENTS: No acute overnight events    SUBJECTIVE / INTERVAL HPI: Patient seen and examined at bedside. Bumex gtt dc yesterday. Pt states still making "a lot" of urine. Body aches resolved. Breathing comfortably off supplemental oxygen. Feels "much better."    VITAL SIGNS:  Vital Signs Last 24 Hrs  T(C): 37.3 (22 Mar 2018 05:00), Max: 37.3 (22 Mar 2018 05:00)  T(F): 99.2 (22 Mar 2018 05:00), Max: 99.2 (22 Mar 2018 05:00)  HR: 64 (22 Mar 2018 05:00) (64 - 70)  BP: 140/65 (22 Mar 2018 05:00) (140/63 - 158/72)  BP(mean): --  RR: 17 (22 Mar 2018 05:00) (17 - 18)  SpO2: 96% (22 Mar 2018 05:00) (92% - 96%)    PHYSICAL EXAM:    General: awake, alert  HEENT: NC/AT; PERRL, clear conjunctiva  Neck: supple  Cardiovascular: +S1/S2; RRR  Respiratory: slight crackles b/l bases  Gastrointestinal: soft, NT/ND; +BSx4  Extremities: s/p R BKA; L foot wounds clean and dry, unable to palpate DP (unchanged from baseline); no lower extremity edema appreciated  Neurological: AAOx3; no focal deficits    MEDICATIONS:  MEDICATIONS  (STANDING):  aspirin enteric coated 81 milliGRAM(s) Oral daily  atorvastatin 80 milliGRAM(s) Oral at bedtime  cyanocobalamin 1000 MICROGram(s) Oral daily  dextrose 5%. 1000 milliLiter(s) (50 mL/Hr) IV Continuous <Continuous>  dextrose 50% Injectable 25 Gram(s) IV Push once  dextrose 50% Injectable 25 Gram(s) IV Push once  furosemide    Tablet 40 milliGRAM(s) Oral daily  heparin  Injectable 5000 Unit(s) SubCutaneous every 8 hours  influenza   Vaccine 0.5 milliLiter(s) IntraMuscular once  insulin lispro (HumaLOG) corrective regimen sliding scale   SubCutaneous three times a day before meals  labetalol 200 milliGRAM(s) Oral three times a day  multivitamin 1 Tablet(s) Oral daily  NIFEdipine XL 90 milliGRAM(s) Oral daily  silver sulfADIAZINE 1% Cream 1 Application(s) Topical daily  sodium bicarbonate 650 milliGRAM(s) Oral every 12 hours    MEDICATIONS  (PRN):  acetaminophen   Tablet. 650 milliGRAM(s) Oral every 6 hours PRN Moderate Pain (4 - 6)  benzocaine 15 mG/menthol 3.6 mG Lozenge 1 Lozenge Oral every 2 hours PRN Sore Throat  dextrose Gel 1 Dose(s) Oral once PRN Blood Glucose LESS THAN 70 milliGRAM(s)/deciliter  glucagon  Injectable 1 milliGRAM(s) IntraMuscular once PRN Glucose LESS THAN 70 milligrams/deciliter      ALLERGIES: No Known Allergies    LABS:                        11.4   5.48  )-----------( 281      ( 22 Mar 2018 06:50 )             35.4     03-21    143  |  98  |  61<H>  ----------------------------<  117<H>  4.2   |  30  |  4.45<H>    Ca    8.9      21 Mar 2018 06:45  Phos  5.5     03-21  Mg     1.8     03-21          CAPILLARY BLOOD GLUCOSE  90 (21 Mar 2018 17:25)  POCT Blood Glucose.: 163 mg/dL (21 Mar 2018 22:31)      RADIOLOGY & ADDITIONAL TESTS: Reviewed.

## 2020-03-31 ENCOUNTER — INPATIENT (INPATIENT)
Facility: HOSPITAL | Age: 65
LOS: 7 days | Discharge: NURSING, REHAB OR LONG-TERM CARE | End: 2020-04-08
Attending: INTERNAL MEDICINE | Admitting: INTERNAL MEDICINE
Payer: MEDICAID

## 2020-03-31 VITALS
RESPIRATION RATE: 16 BRPM | DIASTOLIC BLOOD PRESSURE: 55 MMHG | OXYGEN SATURATION: 97 % | HEART RATE: 61 BPM | SYSTOLIC BLOOD PRESSURE: 130 MMHG | TEMPERATURE: 98 F

## 2020-03-31 DIAGNOSIS — Z89.511 ACQUIRED ABSENCE OF RIGHT LEG BELOW KNEE: Chronic | ICD-10-CM

## 2020-03-31 DIAGNOSIS — Z89.612 ACQUIRED ABSENCE OF LEFT LEG ABOVE KNEE: Chronic | ICD-10-CM

## 2020-03-31 DIAGNOSIS — Z89.611 ACQUIRED ABSENCE OF RIGHT LEG ABOVE KNEE: Chronic | ICD-10-CM

## 2020-03-31 DIAGNOSIS — Z98.890 OTHER SPECIFIED POSTPROCEDURAL STATES: Chronic | ICD-10-CM

## 2020-03-31 NOTE — ED ADULT TRIAGE NOTE - CHIEF COMPLAINT QUOTE
Pt arrives from [facility] for missed dialysis today. Pt last dialysis was 3/25/2020 and states he normally goes MWF, AV fistula to left upper arm and vs as noted. Pt arrives from [facility] for missed dialysis today, reported issue with transport. Pt last dialysis was 3/25/2020 and states he normally goes MWF. AV fistula to left upper arm and vs as noted. Pt arrives from [facility] for missed dialysis today, reported issue with transport. Pt last dialysis was 3/25/2020 and states he normally goes MWF. AV fistula to left upper arm, bilateral leg amputation (left AKA and right BKA) vs as noted.

## 2020-04-01 DIAGNOSIS — E11.9 TYPE 2 DIABETES MELLITUS WITHOUT COMPLICATIONS: ICD-10-CM

## 2020-04-01 DIAGNOSIS — I10 ESSENTIAL (PRIMARY) HYPERTENSION: ICD-10-CM

## 2020-04-01 DIAGNOSIS — N18.6 END STAGE RENAL DISEASE: ICD-10-CM

## 2020-04-01 DIAGNOSIS — N18.9 CHRONIC KIDNEY DISEASE, UNSPECIFIED: ICD-10-CM

## 2020-04-01 LAB
ALBUMIN SERPL ELPH-MCNC: 3.9 G/DL — SIGNIFICANT CHANGE UP (ref 3.3–5)
ALP SERPL-CCNC: 117 U/L — SIGNIFICANT CHANGE UP (ref 40–120)
ALT FLD-CCNC: 38 U/L — SIGNIFICANT CHANGE UP (ref 4–41)
ANION GAP SERPL CALC-SCNC: 20 MMO/L — HIGH (ref 7–14)
ANION GAP SERPL CALC-SCNC: 21 MMO/L — HIGH (ref 7–14)
ANION GAP SERPL CALC-SCNC: 22 MMO/L — HIGH (ref 7–14)
AST SERPL-CCNC: 36 U/L — SIGNIFICANT CHANGE UP (ref 4–40)
BASOPHILS # BLD AUTO: 0.03 K/UL — SIGNIFICANT CHANGE UP (ref 0–0.2)
BASOPHILS NFR BLD AUTO: 0.7 % — SIGNIFICANT CHANGE UP (ref 0–2)
BILIRUB SERPL-MCNC: 0.3 MG/DL — SIGNIFICANT CHANGE UP (ref 0.2–1.2)
BUN SERPL-MCNC: 140 MG/DL — HIGH (ref 7–23)
BUN SERPL-MCNC: 153 MG/DL — HIGH (ref 7–23)
BUN SERPL-MCNC: 65 MG/DL — HIGH (ref 7–23)
CALCIUM SERPL-MCNC: 8.3 MG/DL — LOW (ref 8.4–10.5)
CALCIUM SERPL-MCNC: 8.5 MG/DL — SIGNIFICANT CHANGE UP (ref 8.4–10.5)
CALCIUM SERPL-MCNC: 9.3 MG/DL — SIGNIFICANT CHANGE UP (ref 8.4–10.5)
CHLORIDE SERPL-SCNC: 94 MMOL/L — LOW (ref 98–107)
CHLORIDE SERPL-SCNC: 94 MMOL/L — LOW (ref 98–107)
CHLORIDE SERPL-SCNC: 95 MMOL/L — LOW (ref 98–107)
CO2 SERPL-SCNC: 19 MMOL/L — LOW (ref 22–31)
CO2 SERPL-SCNC: 19 MMOL/L — LOW (ref 22–31)
CO2 SERPL-SCNC: 22 MMOL/L — SIGNIFICANT CHANGE UP (ref 22–31)
CREAT SERPL-MCNC: 10.27 MG/DL — HIGH (ref 0.5–1.3)
CREAT SERPL-MCNC: 5.4 MG/DL — HIGH (ref 0.5–1.3)
CREAT SERPL-MCNC: 9.47 MG/DL — HIGH (ref 0.5–1.3)
EOSINOPHIL # BLD AUTO: 0.13 K/UL — SIGNIFICANT CHANGE UP (ref 0–0.5)
EOSINOPHIL NFR BLD AUTO: 3.2 % — SIGNIFICANT CHANGE UP (ref 0–6)
GLUCOSE SERPL-MCNC: 63 MG/DL — LOW (ref 70–99)
GLUCOSE SERPL-MCNC: 65 MG/DL — LOW (ref 70–99)
GLUCOSE SERPL-MCNC: 73 MG/DL — SIGNIFICANT CHANGE UP (ref 70–99)
HBV SURFACE AG SER-ACNC: NEGATIVE — SIGNIFICANT CHANGE UP
HCT VFR BLD CALC: 38 % — LOW (ref 39–50)
HCT VFR BLD CALC: 41.9 % — SIGNIFICANT CHANGE UP (ref 39–50)
HGB BLD-MCNC: 12.4 G/DL — LOW (ref 13–17)
HGB BLD-MCNC: 13.7 G/DL — SIGNIFICANT CHANGE UP (ref 13–17)
IMM GRANULOCYTES NFR BLD AUTO: 0.2 % — SIGNIFICANT CHANGE UP (ref 0–1.5)
LYMPHOCYTES # BLD AUTO: 0.9 K/UL — LOW (ref 1–3.3)
LYMPHOCYTES # BLD AUTO: 22 % — SIGNIFICANT CHANGE UP (ref 13–44)
MAGNESIUM SERPL-MCNC: 2.2 MG/DL — SIGNIFICANT CHANGE UP (ref 1.6–2.6)
MCHC RBC-ENTMCNC: 28.8 PG — SIGNIFICANT CHANGE UP (ref 27–34)
MCHC RBC-ENTMCNC: 29 PG — SIGNIFICANT CHANGE UP (ref 27–34)
MCHC RBC-ENTMCNC: 32.6 % — SIGNIFICANT CHANGE UP (ref 32–36)
MCHC RBC-ENTMCNC: 32.7 % — SIGNIFICANT CHANGE UP (ref 32–36)
MCV RBC AUTO: 88 FL — SIGNIFICANT CHANGE UP (ref 80–100)
MCV RBC AUTO: 89 FL — SIGNIFICANT CHANGE UP (ref 80–100)
MONOCYTES # BLD AUTO: 0.46 K/UL — SIGNIFICANT CHANGE UP (ref 0–0.9)
MONOCYTES NFR BLD AUTO: 11.2 % — SIGNIFICANT CHANGE UP (ref 2–14)
NEUTROPHILS # BLD AUTO: 2.57 K/UL — SIGNIFICANT CHANGE UP (ref 1.8–7.4)
NEUTROPHILS NFR BLD AUTO: 62.7 % — SIGNIFICANT CHANGE UP (ref 43–77)
NRBC # FLD: 0 K/UL — SIGNIFICANT CHANGE UP (ref 0–0)
NRBC # FLD: 0 K/UL — SIGNIFICANT CHANGE UP (ref 0–0)
PHOSPHATE SERPL-MCNC: 3.8 MG/DL — SIGNIFICANT CHANGE UP (ref 2.5–4.5)
PHOSPHATE SERPL-MCNC: 6.4 MG/DL — HIGH (ref 2.5–4.5)
PLATELET # BLD AUTO: 166 K/UL — SIGNIFICANT CHANGE UP (ref 150–400)
PLATELET # BLD AUTO: 194 K/UL — SIGNIFICANT CHANGE UP (ref 150–400)
PMV BLD: 10.6 FL — SIGNIFICANT CHANGE UP (ref 7–13)
PMV BLD: 11.2 FL — SIGNIFICANT CHANGE UP (ref 7–13)
POTASSIUM SERPL-MCNC: 4.4 MMOL/L — SIGNIFICANT CHANGE UP (ref 3.5–5.3)
POTASSIUM SERPL-MCNC: 6.2 MMOL/L — CRITICAL HIGH (ref 3.5–5.3)
POTASSIUM SERPL-MCNC: 6.9 MMOL/L — CRITICAL HIGH (ref 3.5–5.3)
POTASSIUM SERPL-MCNC: 7.2 MMOL/L — CRITICAL HIGH (ref 3.5–5.3)
POTASSIUM SERPL-SCNC: 4.4 MMOL/L — SIGNIFICANT CHANGE UP (ref 3.5–5.3)
POTASSIUM SERPL-SCNC: 6.2 MMOL/L — CRITICAL HIGH (ref 3.5–5.3)
POTASSIUM SERPL-SCNC: 6.9 MMOL/L — CRITICAL HIGH (ref 3.5–5.3)
POTASSIUM SERPL-SCNC: 7.2 MMOL/L — CRITICAL HIGH (ref 3.5–5.3)
PROT SERPL-MCNC: 8.2 G/DL — SIGNIFICANT CHANGE UP (ref 6–8.3)
PTH-INTACT SERPL-MCNC: 262.3 PG/ML — HIGH (ref 15–65)
RBC # BLD: 4.27 M/UL — SIGNIFICANT CHANGE UP (ref 4.2–5.8)
RBC # BLD: 4.76 M/UL — SIGNIFICANT CHANGE UP (ref 4.2–5.8)
RBC # FLD: 13.9 % — SIGNIFICANT CHANGE UP (ref 10.3–14.5)
RBC # FLD: 14 % — SIGNIFICANT CHANGE UP (ref 10.3–14.5)
SODIUM SERPL-SCNC: 134 MMOL/L — LOW (ref 135–145)
SODIUM SERPL-SCNC: 136 MMOL/L — SIGNIFICANT CHANGE UP (ref 135–145)
SODIUM SERPL-SCNC: 136 MMOL/L — SIGNIFICANT CHANGE UP (ref 135–145)
WBC # BLD: 4.1 K/UL — SIGNIFICANT CHANGE UP (ref 3.8–10.5)
WBC # BLD: 4.54 K/UL — SIGNIFICANT CHANGE UP (ref 3.8–10.5)
WBC # FLD AUTO: 4.1 K/UL — SIGNIFICANT CHANGE UP (ref 3.8–10.5)
WBC # FLD AUTO: 4.54 K/UL — SIGNIFICANT CHANGE UP (ref 3.8–10.5)

## 2020-04-01 RX ORDER — LACTOBACILLUS ACIDOPHILUS 100MM CELL
1 CAPSULE ORAL DAILY
Refills: 0 | Status: DISCONTINUED | OUTPATIENT
Start: 2020-04-01 | End: 2020-04-08

## 2020-04-01 RX ORDER — ACETAMINOPHEN 500 MG
650 TABLET ORAL EVERY 6 HOURS
Refills: 0 | Status: DISCONTINUED | OUTPATIENT
Start: 2020-04-01 | End: 2020-04-08

## 2020-04-01 RX ORDER — CALCIUM ACETATE 667 MG
1334 TABLET ORAL
Refills: 0 | Status: DISCONTINUED | OUTPATIENT
Start: 2020-04-01 | End: 2020-04-08

## 2020-04-01 RX ORDER — GLUCAGON INJECTION, SOLUTION 0.5 MG/.1ML
1 INJECTION, SOLUTION SUBCUTANEOUS ONCE
Refills: 0 | Status: DISCONTINUED | OUTPATIENT
Start: 2020-04-01 | End: 2020-04-08

## 2020-04-01 RX ORDER — DEXTROSE 50 % IN WATER 50 %
25 SYRINGE (ML) INTRAVENOUS ONCE
Refills: 0 | Status: DISCONTINUED | OUTPATIENT
Start: 2020-04-01 | End: 2020-04-01

## 2020-04-01 RX ORDER — DEXTROSE 50 % IN WATER 50 %
12.5 SYRINGE (ML) INTRAVENOUS ONCE
Refills: 0 | Status: DISCONTINUED | OUTPATIENT
Start: 2020-04-01 | End: 2020-04-08

## 2020-04-01 RX ORDER — FUROSEMIDE 40 MG
40 TABLET ORAL
Refills: 0 | Status: DISCONTINUED | OUTPATIENT
Start: 2020-04-01 | End: 2020-04-08

## 2020-04-01 RX ORDER — INSULIN LISPRO 100/ML
VIAL (ML) SUBCUTANEOUS
Refills: 0 | Status: DISCONTINUED | OUTPATIENT
Start: 2020-04-01 | End: 2020-04-08

## 2020-04-01 RX ORDER — ATORVASTATIN CALCIUM 80 MG/1
40 TABLET, FILM COATED ORAL AT BEDTIME
Refills: 0 | Status: DISCONTINUED | OUTPATIENT
Start: 2020-04-01 | End: 2020-04-08

## 2020-04-01 RX ORDER — CHLORHEXIDINE GLUCONATE 213 G/1000ML
1 SOLUTION TOPICAL DAILY
Refills: 0 | Status: DISCONTINUED | OUTPATIENT
Start: 2020-04-01 | End: 2020-04-08

## 2020-04-01 RX ORDER — NIFEDIPINE 30 MG
60 TABLET, EXTENDED RELEASE 24 HR ORAL AT BEDTIME
Refills: 0 | Status: DISCONTINUED | OUTPATIENT
Start: 2020-04-01 | End: 2020-04-08

## 2020-04-01 RX ORDER — DEXTROSE 50 % IN WATER 50 %
50 SYRINGE (ML) INTRAVENOUS ONCE
Refills: 0 | Status: DISCONTINUED | OUTPATIENT
Start: 2020-04-01 | End: 2020-04-01

## 2020-04-01 RX ORDER — HEPARIN SODIUM 5000 [USP'U]/ML
5000 INJECTION INTRAVENOUS; SUBCUTANEOUS
Refills: 0 | Status: DISCONTINUED | OUTPATIENT
Start: 2020-04-01 | End: 2020-04-04

## 2020-04-01 RX ORDER — INSULIN LISPRO 100/ML
VIAL (ML) SUBCUTANEOUS AT BEDTIME
Refills: 0 | Status: DISCONTINUED | OUTPATIENT
Start: 2020-04-01 | End: 2020-04-08

## 2020-04-01 RX ORDER — INSULIN LISPRO 100/ML
VIAL (ML) SUBCUTANEOUS
Refills: 0 | Status: DISCONTINUED | OUTPATIENT
Start: 2020-04-01 | End: 2020-04-01

## 2020-04-01 RX ORDER — DEXTROSE 50 % IN WATER 50 %
15 SYRINGE (ML) INTRAVENOUS ONCE
Refills: 0 | Status: DISCONTINUED | OUTPATIENT
Start: 2020-04-01 | End: 2020-04-08

## 2020-04-01 RX ORDER — LABETALOL HCL 100 MG
100 TABLET ORAL
Refills: 0 | Status: DISCONTINUED | OUTPATIENT
Start: 2020-04-01 | End: 2020-04-03

## 2020-04-01 RX ORDER — SODIUM CHLORIDE 9 MG/ML
1000 INJECTION, SOLUTION INTRAVENOUS
Refills: 0 | Status: DISCONTINUED | OUTPATIENT
Start: 2020-04-01 | End: 2020-04-08

## 2020-04-01 RX ORDER — SODIUM BICARBONATE 1 MEQ/ML
50 SYRINGE (ML) INTRAVENOUS ONCE
Refills: 0 | Status: DISCONTINUED | OUTPATIENT
Start: 2020-04-01 | End: 2020-04-02

## 2020-04-01 RX ORDER — INSULIN HUMAN 100 [IU]/ML
5 INJECTION, SOLUTION SUBCUTANEOUS ONCE
Refills: 0 | Status: DISCONTINUED | OUTPATIENT
Start: 2020-04-01 | End: 2020-04-01

## 2020-04-01 RX ORDER — DEXTROSE 50 % IN WATER 50 %
25 SYRINGE (ML) INTRAVENOUS ONCE
Refills: 0 | Status: DISCONTINUED | OUTPATIENT
Start: 2020-04-01 | End: 2020-04-08

## 2020-04-01 RX ORDER — GLUCAGON INJECTION, SOLUTION 0.5 MG/.1ML
1 INJECTION, SOLUTION SUBCUTANEOUS ONCE
Refills: 0 | Status: DISCONTINUED | OUTPATIENT
Start: 2020-04-01 | End: 2020-04-01

## 2020-04-01 RX ORDER — SODIUM CHLORIDE 9 MG/ML
1000 INJECTION, SOLUTION INTRAVENOUS
Refills: 0 | Status: DISCONTINUED | OUTPATIENT
Start: 2020-04-01 | End: 2020-04-01

## 2020-04-01 RX ORDER — DEXTROSE 50 % IN WATER 50 %
15 SYRINGE (ML) INTRAVENOUS ONCE
Refills: 0 | Status: DISCONTINUED | OUTPATIENT
Start: 2020-04-01 | End: 2020-04-01

## 2020-04-01 RX ORDER — CALCIUM GLUCONATE 100 MG/ML
2 VIAL (ML) INTRAVENOUS ONCE
Refills: 0 | Status: DISCONTINUED | OUTPATIENT
Start: 2020-04-01 | End: 2020-04-02

## 2020-04-01 RX ORDER — SENNA PLUS 8.6 MG/1
2 TABLET ORAL AT BEDTIME
Refills: 0 | Status: DISCONTINUED | OUTPATIENT
Start: 2020-04-01 | End: 2020-04-08

## 2020-04-01 RX ORDER — LACTULOSE 10 G/15ML
10 SOLUTION ORAL AT BEDTIME
Refills: 0 | Status: DISCONTINUED | OUTPATIENT
Start: 2020-04-01 | End: 2020-04-08

## 2020-04-01 RX ORDER — ASPIRIN/CALCIUM CARB/MAGNESIUM 324 MG
81 TABLET ORAL DAILY
Refills: 0 | Status: DISCONTINUED | OUTPATIENT
Start: 2020-04-01 | End: 2020-04-08

## 2020-04-01 RX ORDER — DEXTROSE 50 % IN WATER 50 %
12.5 SYRINGE (ML) INTRAVENOUS ONCE
Refills: 0 | Status: DISCONTINUED | OUTPATIENT
Start: 2020-04-01 | End: 2020-04-01

## 2020-04-01 RX ADMIN — Medication 40 MILLIGRAM(S): at 17:29

## 2020-04-01 RX ADMIN — Medication 81 MILLIGRAM(S): at 18:50

## 2020-04-01 RX ADMIN — Medication 1 TABLET(S): at 17:29

## 2020-04-01 RX ADMIN — Medication 100 MILLIGRAM(S): at 17:34

## 2020-04-01 RX ADMIN — Medication 1 TABLET(S): at 17:28

## 2020-04-01 RX ADMIN — Medication 1334 MILLIGRAM(S): at 17:28

## 2020-04-01 RX ADMIN — HEPARIN SODIUM 5000 UNIT(S): 5000 INJECTION INTRAVENOUS; SUBCUTANEOUS at 17:27

## 2020-04-01 RX ADMIN — CHLORHEXIDINE GLUCONATE 1 APPLICATION(S): 213 SOLUTION TOPICAL at 17:27

## 2020-04-01 NOTE — ED ADULT NURSE REASSESSMENT NOTE - NS ED NURSE REASSESS COMMENT FT1
Report given to Dialysis MARCO Martin, pt taken to dialysis at this time. Report given to Dialysis MARCO Martin, pt had diaper changed, skin observed to be warm, dry and intact at this time,  pt taken to dialysis at this time. Report given to Dialysis MARCO Martin, pt had diaper changed, continues to be A&Ox4, no pain/ symptoms, skin observed to be warm, dry and intact at this time, MARCO Martin made aware meds were not given before pt was taken to floor, pt taken to dialysis at this time.

## 2020-04-01 NOTE — ED PROVIDER NOTE - ATTENDING CONTRIBUTION TO CARE
DR. MARTINI, ATTENDING MD-  I performed a face to face bedside interview with the patient regarding history of present illness, review of symptoms and past medical history. I completed an independent physical exam.  I have discussed the patient's plan of care with the PA.   Documentation as above in the note.    66 y/o male h/o esrd on hd, htn, dm bibems from sn[facility] for hd.  Per xfer note, last hd session was on 25th; has not been able to attend hd since that time d/t transport issues.  Pt states he feels at [facility]; no complaints.  Obtain cbc, cmp, ekg, admit for hd.

## 2020-04-01 NOTE — ED PROVIDER NOTE - OBJECTIVE STATEMENT
Patient is a 66 y/o M with hx of DM, HTN, right BKA,, left AKA, ESRD on HD M/W/F at [facility] presenting for missed dialysis. last HD was done on 3/25/20. patiet states that due to transportation issues he was not taken to center. He denies any acute complaints, no fever, chills, n/v, chest pain, palpitations, SOB, cough Patient is a 64 y/o M with hx of DM, HTN, right BKA,, left AKA, ESRD on HD M/W/F at [facility] presenting for missed dialysis. last HD was done on 3/25/20. patiet states that due to transportation issues he was not taken to center. He denies any acute complaints, no fever, chills, n/v, chest pain, palpitations, SOB, cough.

## 2020-04-01 NOTE — ED PROVIDER NOTE - RESPIRATORY NEGATIVE STATEMENT, MLM
No pertinent family history in first degree relatives
no chest pain, no cough, and no shortness of breath.

## 2020-04-01 NOTE — CONSULT NOTE ADULT - SUBJECTIVE AND OBJECTIVE BOX
[facility]   MD JEANNETTE CARRION DO ANAM SIDDIQUI ANGELA WONG, PA    TEL:  OFFICE: [phone]  DR ROSENBERG CELL: [phone]  DR. HANSEN CELL: [phone]  DR. MANDEL CELL: [phone]  YONNY WEBB CELL: [phone]    HPI:  66 y/o M with hx of DM, HTN, right BKA,, left AKA, ESRD on HD M/W/F via AVF at [facility] presenting for missed dialysis. last HD was done on 3/25/20. patiet states that due to transportation issues he was not taken to center. He denies any acute complaints, no fever, chills, n/v, chest pain, palpitations, SOB, cough.     Allergies:  No Known Allergies      PAST MEDICAL & SURGICAL HISTORY:  Kidney disease  HTN (hypertension)  DM (diabetes mellitus)  Wound: (chronic wounds to left foot and leg)  MRSA (methicillin resistant Staphylococcus aureus) colonization: (hx/o MRSA growth from wound culture)  CKD (chronic kidney disease)  Below knee amputation status, right  HTN (hypertension)  DM (diabetes mellitus)  Amputated right leg: below knee  Amputated left leg: above knee  H/O peripheral artery bypass: left fem to below-knee pop with RSVG  S/P BKA (below knee amputation) unilateral, right      Home Medications Reviewed    Hospital Medications:   MEDICATIONS  (STANDING):  aspirin enteric coated 81 milliGRAM(s) Oral daily  atorvastatin 40 milliGRAM(s) Oral at bedtime  calcium acetate 1334 milliGRAM(s) Oral three times a day with meals  calcium gluconate IVPB 2 Gram(s) IV Intermittent once  chlorhexidine 4% Liquid 1 Application(s) Topical daily  dextrose 5%. 1000 milliLiter(s) (50 mL/Hr) IV Continuous <Continuous>  dextrose 50% Injectable 50 milliLiter(s) IV Push once  dextrose 50% Injectable 12.5 Gram(s) IV Push once  dextrose 50% Injectable 25 Gram(s) IV Push once  dextrose 50% Injectable 25 Gram(s) IV Push once  furosemide    Tablet 40 milliGRAM(s) Oral two times a day  heparin  Injectable 5000 Unit(s) SubCutaneous two times a day  insulin lispro (HumaLOG) corrective regimen sliding scale   SubCutaneous three times a day before meals  insulin regular  human recombinant. 5 Unit(s) IV Push once  labetalol 100 milliGRAM(s) Oral two times a day  lactobacillus acidophilus 1 Tablet(s) Oral daily  lactulose Syrup 10 Gram(s) Oral at bedtime  multivitamin 1 Tablet(s) Oral daily  NIFEdipine XL 60 milliGRAM(s) Oral at bedtime  senna 2 Tablet(s) Oral at bedtime  sodium bicarbonate  Injectable 50 milliEquivalent(s) IV Push once      SOCIAL HISTORY:  Denies ETOh, Smoking,     FAMILY HISTORY:  Family history of diabetes mellitus      REVIEW OF SYSTEMS:  CONSTITUTIONAL: No weakness, fevers or chills  EYES/ENT: b/l lost of vision  NECK: No pain or stiffness  RESPIRATORY: No cough, wheezing, hemoptysis; No shortness of breath  CARDIOVASCULAR: No chest pain or palpitations.  GASTROINTESTINAL: No abdominal or epigastric pain. No nausea, vomiting, or hematemesis; No diarrhea or constipation. No melena or hematochezia.  GENITOURINARY: No dysuria, frequency, foamy urine, urinary urgency, incontinence or hematuria  NEUROLOGICAL: No numbness or weakness  SKIN: No itching, burning, rashes, or lesions   VASCULAR: No bilateral lower extremity edema.   All other review of systems is negative unless indicated above.    VITALS:  T(F): 97.2 (04-01-20 @ 10:45), Max: 98.8 (04-01-20 @ 09:04)  HR: 64 (04-01-20 @ 10:45)  BP: 175/77 (04-01-20 @ 10:45)  RR: 16 (04-01-20 @ 10:45)  SpO2: 96% (04-01-20 @ 09:04)  Wt(kg): --        PHYSICAL EXAM:  Constitutional: NAD  HEENT: anicteric sclera, oropharynx clear, MMM  Neck: No JVD  Respiratory: CTAB, no wheezes, rales or rhonchi  Cardiovascular: S1, S2, RRR  Gastrointestinal: BS+, soft, NT/ND  Extremities: right bka, left aka  Neurological: A/O x 3, no focal deficits  Psychiatric: Normal mood, normal affect  : No CVA tenderness. No cervantes.   Skin: No rashes  Vascular Access: AVF left. + thrill + bruit    LABS:  04-01    136  |  95<L>  |  153<H>  ----------------------------<  65<L>  6.2<HH>   |  19<L>  |  10.27<H>    Ca    8.5      01 Apr 2020 09:00    TPro  8.2  /  Alb  3.9  /  TBili  0.3  /  DBili      /  AST  36  /  ALT  38  /  AlkPhos  117  04-01    Creatinine Trend: 10.27 <--, 9.47 <--                        12.4   4.10  )-----------( 194      ( 01 Apr 2020 04:30 )             38.0     Urine Studies:        RADIOLOGY & ADDITIONAL STUDIES:

## 2020-04-01 NOTE — ED PROVIDER NOTE - PSH
Amputated left leg  above knee  Amputated right leg  below knee  H/O peripheral artery bypass  left fem to below-knee pop with RSVG  S/P BKA (below knee amputation) unilateral, right

## 2020-04-01 NOTE — CONSULT NOTE ADULT - ASSESSMENT
64 y/o M with hx of DM, HTN, right BKA,, left AKA, ESRD on HD M/W/F via AVF at [facility] presenting for missed dialysis    ESRD on HD via AVF MWF  last HD 3/25  HD today  consent in chart  renal diet  monitor electrolyte    hyperkalemia  sec to missed hd  getting HD today  monitor    anemia  at goal  no need for epo  monitor hb    htn  uncontrolled  resume home meds  uf with hd  monitor  will adjust if needed    ckd-mbd  check pth, phos  monitor calcium and phos daily 66 y/o M with hx of DM, HTN, right BKA,, left AKA, ESRD on HD M/W/F via AVF at [facility] presenting for missed dialysis    ESRD on HD via AVF MWF  last HD 3/25  HD today  consent in chart  renal diet  monitor electrolyte    Hyperkalemia  sec to missed hd  getting HD today  monitor    Anemia  at goal  no need for epo  monitor hb    HTN  uncontrolled  resume home meds  uf with hd  monitor  will adjust if needed    Ckd-mbd  check pth, phos  monitor calcium and phos daily

## 2020-04-01 NOTE — ED ADULT NURSE REASSESSMENT NOTE - NS ED NURSE REASSESS COMMENT FT1
Dialysis RN made aware report was given to floor and statse once patient finishes w/ dialysis will place pt in for transport to floor.

## 2020-04-01 NOTE — PROGRESS NOTE ADULT - SUBJECTIVE AND OBJECTIVE BOX
HEMODIALYSIS NOTE  --------------------------------------------------------------------------------  Chief Complaint: ESRD/Ongoing hemodialysis requirement    24 hour events/subjective:  no complaint       PAST HISTORY  --------------------------------------------------------------------------------  No significant changes to PMH, PSH, FHx, SHx, unless otherwise noted    ALLERGIES & MEDICATIONS  --------------------------------------------------------------------------------  Allergies    No Known Allergies    Intolerances      Standing Inpatient Medications  aspirin enteric coated 81 milliGRAM(s) Oral daily  atorvastatin 40 milliGRAM(s) Oral at bedtime  calcium acetate 1334 milliGRAM(s) Oral three times a day with meals  calcium gluconate IVPB 2 Gram(s) IV Intermittent once  chlorhexidine 4% Liquid 1 Application(s) Topical daily  dextrose 5%. 1000 milliLiter(s) IV Continuous <Continuous>  dextrose 50% Injectable 50 milliLiter(s) IV Push once  dextrose 50% Injectable 12.5 Gram(s) IV Push once  dextrose 50% Injectable 25 Gram(s) IV Push once  dextrose 50% Injectable 25 Gram(s) IV Push once  furosemide    Tablet 40 milliGRAM(s) Oral two times a day  heparin  Injectable 5000 Unit(s) SubCutaneous two times a day  insulin lispro (HumaLOG) corrective regimen sliding scale   SubCutaneous three times a day before meals  insulin regular  human recombinant. 5 Unit(s) IV Push once  labetalol 100 milliGRAM(s) Oral two times a day  lactobacillus acidophilus 1 Tablet(s) Oral daily  lactulose Syrup 10 Gram(s) Oral at bedtime  multivitamin 1 Tablet(s) Oral daily  NIFEdipine XL 60 milliGRAM(s) Oral at bedtime  senna 2 Tablet(s) Oral at bedtime  sodium bicarbonate  Injectable 50 milliEquivalent(s) IV Push once    PRN Inpatient Medications  acetaminophen   Tablet .. 650 milliGRAM(s) Oral every 6 hours PRN  dextrose 40% Gel 15 Gram(s) Oral once PRN  glucagon  Injectable 1 milliGRAM(s) IntraMuscular once PRN      VITALS/PHYSICAL EXAM  --------------------------------------------------------------------------------  T(C): 36.2 (04-01-20 @ 10:45), Max: 37.1 (04-01-20 @ 09:04)  HR: 64 (04-01-20 @ 10:45) (57 - 64)  BP: 175/77 (04-01-20 @ 10:45) (130/55 - 175/77)  RR: 16 (04-01-20 @ 10:45) (16 - 16)  SpO2: 96% (04-01-20 @ 09:04) (96% - 100%)  Wt(kg): --        Physical Exam:  	Gen: NAD, well-appearing  	HEENT: PERRL, supple neck, clear oropharynx  	Pulm: CTA B/L  	CV: RRR, S1S2; no rub  	Abd: +BS, soft, nontender/nondistended  	: No suprapubic tenderness  	UE: Warm, FROM, no clubbing, intact strength; no edema; no asterixis  	LE: Warm, FROM, no clubbing, intact strength; no edema  	Vascular access: avf     LABS/STUDIES  --------------------------------------------------------------------------------              12.4   4.10  >-----------<  194      [04-01-20 @ 04:30]              38.0     136  |  95  |  153  ----------------------------<  65      [04-01-20 @ 09:00]  6.2   |  19  |  10.27        Ca     8.5     [04-01-20 @ 09:00]    TPro  8.2  /  Alb  3.9  /  TBili  0.3  /  DBili  x   /  AST  36  /  ALT  38  /  AlkPhos  117  [04-01-20 @ 04:30]          HbA1c 4.2      [07-19-19 @ 09:56] HEMODIALYSIS NOTE  --------------------------------------------------------------------------------  Chief Complaint: ESRD/Ongoing hemodialysis requirement    24 hour events/subjective:  no complaint       PAST HISTORY  --------------------------------------------------------------------------------  No significant changes to PMH, PSH, FHx, SHx, unless otherwise noted    ALLERGIES & MEDICATIONS  --------------------------------------------------------------------------------  Allergies    No Known Allergies    Intolerances      Standing Inpatient Medications  aspirin enteric coated 81 milliGRAM(s) Oral daily  atorvastatin 40 milliGRAM(s) Oral at bedtime  calcium acetate 1334 milliGRAM(s) Oral three times a day with meals  calcium gluconate IVPB 2 Gram(s) IV Intermittent once  chlorhexidine 4% Liquid 1 Application(s) Topical daily  dextrose 5%. 1000 milliLiter(s) IV Continuous <Continuous>  dextrose 50% Injectable 50 milliLiter(s) IV Push once  dextrose 50% Injectable 12.5 Gram(s) IV Push once  dextrose 50% Injectable 25 Gram(s) IV Push once  dextrose 50% Injectable 25 Gram(s) IV Push once  furosemide    Tablet 40 milliGRAM(s) Oral two times a day  heparin  Injectable 5000 Unit(s) SubCutaneous two times a day  insulin lispro (HumaLOG) corrective regimen sliding scale   SubCutaneous three times a day before meals  insulin regular  human recombinant. 5 Unit(s) IV Push once  labetalol 100 milliGRAM(s) Oral two times a day  lactobacillus acidophilus 1 Tablet(s) Oral daily  lactulose Syrup 10 Gram(s) Oral at bedtime  multivitamin 1 Tablet(s) Oral daily  NIFEdipine XL 60 milliGRAM(s) Oral at bedtime  senna 2 Tablet(s) Oral at bedtime  sodium bicarbonate  Injectable 50 milliEquivalent(s) IV Push once    PRN Inpatient Medications  acetaminophen   Tablet .. 650 milliGRAM(s) Oral every 6 hours PRN  dextrose 40% Gel 15 Gram(s) Oral once PRN  glucagon  Injectable 1 milliGRAM(s) IntraMuscular once PRN      VITALS/PHYSICAL EXAM  --------------------------------------------------------------------------------  T(C): 36.2 (04-01-20 @ 10:45), Max: 37.1 (04-01-20 @ 09:04)  HR: 64 (04-01-20 @ 10:45) (57 - 64)  BP: 175/77 (04-01-20 @ 10:45) (130/55 - 175/77)  RR: 16 (04-01-20 @ 10:45) (16 - 16)  SpO2: 96% (04-01-20 @ 09:04) (96% - 100%)  Wt(kg): --        Physical Exam:  	Gen: NAD, well-appearing  	HEENT: PERRL, supple neck, clear oropharynx  	Pulm: CTA B/L  	CV: RRR, S1S2; no rub  	Abd: +BS, soft, nontender/nondistended  	: No suprapubic tenderness  	UE: Warm, FROM, no clubbing, intact strength; no edema; no asterixis  	LE: right bka, left aka  	Vascular access: avf     LABS/STUDIES  --------------------------------------------------------------------------------              12.4   4.10  >-----------<  194      [04-01-20 @ 04:30]              38.0     136  |  95  |  153  ----------------------------<  65      [04-01-20 @ 09:00]  6.2   |  19  |  10.27        Ca     8.5     [04-01-20 @ 09:00]    TPro  8.2  /  Alb  3.9  /  TBili  0.3  /  DBili  x   /  AST  36  /  ALT  38  /  AlkPhos  117  [04-01-20 @ 04:30]          HbA1c 4.2      [07-19-19 @ 09:56]

## 2020-04-01 NOTE — ED ADULT NURSE NOTE - NSIMPLEMENTINTERV_GEN_ALL_ED
Implemented All Fall Risk Interventions:  [place] to call system. Call bell, personal items and telephone within reach. Instruct patient to call for assistance. Room bathroom lighting operational. Non-slip footwear when patient is off stretcher. Physically safe environment: no spills, clutter or unnecessary equipment. Stretcher in lowest position, wheels locked, appropriate side rails in place. Provide visual cue, wrist band, yellow gown, etc. Monitor gait and stability. Monitor for mental status changes and reorient to person, place, and time. Review medications for side effects contributing to fall risk. Reinforce activity limits and safety measures with patient and family.

## 2020-04-01 NOTE — ED PROVIDER NOTE - PROGRESS NOTE DETAILS
BECKI Srivastava- received sign out from from BECKI Cintron. pt repeat potassium hemolyzed again, will repeat 3rd time. as per nephro team, suggesting to admit to Dr. Johns and will have dialysis today. I spoke with Dr. Johns. PAIGE burgess spoke w/ MAR, aware 6.2 K not hemolyzed, ordered medications for hyperkalemia.

## 2020-04-01 NOTE — ED ADULT NURSE NOTE - CHIEF COMPLAINT QUOTE
Pt arrives from [facility] for missed dialysis today, reported issue with transport. Pt last dialysis was 3/25/2020 and states he normally goes MWF. AV fistula to left upper arm, bilateral leg amputation (left AKA and right BKA) vs as noted.

## 2020-04-01 NOTE — ED PROVIDER NOTE - CLINICAL SUMMARY MEDICAL DECISION MAKING FREE TEXT BOX
64 y/o M w/ ESRD on HD presenting to ED for missed dialysis since 3/25/20. plan for routine labs, EKG, and admission

## 2020-04-01 NOTE — H&P ADULT - HISTORY OF PRESENT ILLNESS
Patient is a 64 y/o M with hx of DM, HTN, right BKA,, left AKA, ESRD on HD M/W/F at [facility] presenting for missed dialysis. last HD was done on 3/25/20. patiet states that due to transportation issues he was not taken to center. He denies any acute complaints, no fever, chills, n/v, chest pain, palpitations, SOB, cough.

## 2020-04-01 NOTE — ED ADULT NURSE NOTE - OBJECTIVE STATEMENT
pt received from [facility]  for missed dialysis. pt states he missed dialysis because "they didn't pick me up" last dialysis Friday. AV fistula left upper arm +thrill. 22G IV placed right arm, labs sent. pt has bilateral lower extremity amputations, incontinent x2.

## 2020-04-02 ENCOUNTER — TRANSCRIPTION ENCOUNTER (OUTPATIENT)
Age: 65
End: 2020-04-02

## 2020-04-02 DIAGNOSIS — Z02.9 ENCOUNTER FOR ADMINISTRATIVE EXAMINATIONS, UNSPECIFIED: ICD-10-CM

## 2020-04-02 DIAGNOSIS — Z29.9 ENCOUNTER FOR PROPHYLACTIC MEASURES, UNSPECIFIED: ICD-10-CM

## 2020-04-02 LAB
ANION GAP SERPL CALC-SCNC: 17 MMO/L — HIGH (ref 7–14)
BUN SERPL-MCNC: 80 MG/DL — HIGH (ref 7–23)
CALCIUM SERPL-MCNC: 9.1 MG/DL — SIGNIFICANT CHANGE UP (ref 8.4–10.5)
CHLORIDE SERPL-SCNC: 92 MMOL/L — LOW (ref 98–107)
CO2 SERPL-SCNC: 23 MMOL/L — SIGNIFICANT CHANGE UP (ref 22–31)
CREAT SERPL-MCNC: 6.67 MG/DL — HIGH (ref 0.5–1.3)
GLUCOSE SERPL-MCNC: 110 MG/DL — HIGH (ref 70–99)
HCT VFR BLD CALC: 38.2 % — LOW (ref 39–50)
HGB BLD-MCNC: 12.5 G/DL — LOW (ref 13–17)
MAGNESIUM SERPL-MCNC: 2.2 MG/DL — SIGNIFICANT CHANGE UP (ref 1.6–2.6)
MCHC RBC-ENTMCNC: 29 PG — SIGNIFICANT CHANGE UP (ref 27–34)
MCHC RBC-ENTMCNC: 32.7 % — SIGNIFICANT CHANGE UP (ref 32–36)
MCV RBC AUTO: 88.6 FL — SIGNIFICANT CHANGE UP (ref 80–100)
NRBC # FLD: 0 K/UL — SIGNIFICANT CHANGE UP (ref 0–0)
PHOSPHATE SERPL-MCNC: 5.1 MG/DL — HIGH (ref 2.5–4.5)
PLATELET # BLD AUTO: 160 K/UL — SIGNIFICANT CHANGE UP (ref 150–400)
PMV BLD: 11.1 FL — SIGNIFICANT CHANGE UP (ref 7–13)
POTASSIUM SERPL-MCNC: 4.7 MMOL/L — SIGNIFICANT CHANGE UP (ref 3.5–5.3)
POTASSIUM SERPL-SCNC: 4.7 MMOL/L — SIGNIFICANT CHANGE UP (ref 3.5–5.3)
RBC # BLD: 4.31 M/UL — SIGNIFICANT CHANGE UP (ref 4.2–5.8)
RBC # FLD: 14.1 % — SIGNIFICANT CHANGE UP (ref 10.3–14.5)
SODIUM SERPL-SCNC: 132 MMOL/L — LOW (ref 135–145)
WBC # BLD: 3.79 K/UL — LOW (ref 3.8–10.5)
WBC # FLD AUTO: 3.79 K/UL — LOW (ref 3.8–10.5)

## 2020-04-02 RX ORDER — DEXTROSE 50 % IN WATER 50 %
12.5 SYRINGE (ML) INTRAVENOUS ONCE
Refills: 0 | Status: COMPLETED | OUTPATIENT
Start: 2020-04-02 | End: 2020-04-02

## 2020-04-02 RX ADMIN — Medication 100 MILLIGRAM(S): at 18:31

## 2020-04-02 RX ADMIN — Medication 1334 MILLIGRAM(S): at 14:15

## 2020-04-02 RX ADMIN — Medication 1334 MILLIGRAM(S): at 18:31

## 2020-04-02 RX ADMIN — HEPARIN SODIUM 5000 UNIT(S): 5000 INJECTION INTRAVENOUS; SUBCUTANEOUS at 18:31

## 2020-04-02 RX ADMIN — LACTULOSE 10 GRAM(S): 10 SOLUTION ORAL at 22:45

## 2020-04-02 RX ADMIN — Medication 60 MILLIGRAM(S): at 22:45

## 2020-04-02 RX ADMIN — Medication 1 TABLET(S): at 14:15

## 2020-04-02 RX ADMIN — CHLORHEXIDINE GLUCONATE 1 APPLICATION(S): 213 SOLUTION TOPICAL at 14:15

## 2020-04-02 RX ADMIN — Medication 1334 MILLIGRAM(S): at 09:57

## 2020-04-02 RX ADMIN — SENNA PLUS 2 TABLET(S): 8.6 TABLET ORAL at 22:45

## 2020-04-02 RX ADMIN — Medication 81 MILLIGRAM(S): at 14:18

## 2020-04-02 RX ADMIN — ATORVASTATIN CALCIUM 40 MILLIGRAM(S): 80 TABLET, FILM COATED ORAL at 22:45

## 2020-04-02 RX ADMIN — Medication 12.5 MILLILITER(S): at 09:35

## 2020-04-02 RX ADMIN — Medication 40 MILLIGRAM(S): at 06:46

## 2020-04-02 RX ADMIN — HEPARIN SODIUM 5000 UNIT(S): 5000 INJECTION INTRAVENOUS; SUBCUTANEOUS at 06:46

## 2020-04-02 RX ADMIN — Medication 40 MILLIGRAM(S): at 18:31

## 2020-04-02 RX ADMIN — Medication 100 MILLIGRAM(S): at 06:46

## 2020-04-02 NOTE — DISCHARGE NOTE PROVIDER - CARE PROVIDER_API CALL
Gio Garza (DO)  Nephrology  [address], [address] [place] [address]  Phone: [phone]  Fax: [phone]  Follow Up Time:

## 2020-04-02 NOTE — PROGRESS NOTE ADULT - PROBLEM SELECTOR PLAN 5
Transitions of Care Status:  1.  Name of PCP: Dr. Arian Colon   2.  PCP Contacted on Admission: [ ] Y    [ ] N    3.  PCP contacted at Discharge: [ ] Y    [ ] N    [ ] N/A  4.  Post-Discharge Appointment Date and Location:  5.  Summary of Handoff given to PCP:

## 2020-04-02 NOTE — DISCHARGE NOTE PROVIDER - HOSPITAL COURSE
64 y/o Male with hx of DM, HTN, right BKA,, left AKA, ESRD on HD M/W/F at [facility] presenting to HD for missed dialysis. Patient's last HD was done on 3/25/20. Patient states that due to transportation issues he was not able to be taken to center. Patient found to be hyperkalemic to 7.9, and underwent urgent HD, with improvement in his potassium. Patient underwent a subsequent session of HD on 4/2, and Renal said that he was okay for discharge. Patient will continue his regularly scheduled HD sessions on M,W,F. 64 y/o Male with hx of DM, HTN, right BKA,, left AKA, ESRD on HD M/W/F at [facility] presenting to HD for missed dialysis. Patient's last HD was done on 3/25/20. Patient states that due to transportation issues he was not able to be taken to center. Patient found to be hyperkalemic to 7.9, and underwent urgent HD, with improvement in his potassium. Patient underwent a subsequent session of HD on 4/2, and Renal said that he was okay for discharge. Patient then spiked a fever to 100.7, and tested positive for COVID 19.         Upon discharge, patient will continue his regularly scheduled HD sessions on M,W,F. 66 y/o Male with hx of DM, HTN, right BKA,, left AKA, ESRD on HD M/W/F at [facility] presenting to HD for missed dialysis. Patient's last HD was done on 3/25/20. Patient states that due to transportation issues he was not able to be taken to center. Patient found to be hyperkalemic to 7.9, and underwent urgent HD, with improvement in his potassium. Patient underwent a subsequent session of HD on 4/2, and Renal said that he was okay for discharge. Patient then spiked a fever to 100.7, and tested positive for COVID 19.         After admission, his last fever was on 4/3 at 4am. He subsequently remained afebrile and on room air without respiratory distress.         Upon discharge, patient will continue his regularly scheduled HD sessions on M,W,F. 66 y/o Male with hx of DM, HTN, right BKA,, left AKA, ESRD on HD M/W/F at [facility] presenting to HD for missed dialysis. Patient's last HD was done on 3/25/20. Patient states that due to transportation issues he was not able to be taken to center. Patient found to be hyperkalemic to 7.9 in the setting of missed HD, and underwent urgent HD on 4/1, with improvement in his potassium. Patient then spiked a fever to 100.7, and tested positive for COVID 19. Patient underwent a subsequent session of HD on 4/2, 4/4, and 4/7. Per renal, patient is cleared for discharge.         After admission, his last fever was on 4/3 at 4am. He subsequently remained afebrile and on room air without respiratory distress. Throughout hospitalization, he was frequently hypoglycemic (FSG 70s-80s) - resolved with juice and now on standing additional glucose. Also, labs indicated hyperkalemia - better with lokelma x1 and HD.         As an aside, patient reported acute on chronic back pain. On exam, there was a 1.5inch nodule. It was not draining, non-erythematous, mobile. Per patient, onset was prior to admission, non-pruritic. Possibly an epidermoid cyst. Rec outpatient follow-up with dermatology.        Upon discharge, patient will continue his regularly scheduled HD sessions on M,W,F.        .    LABS:                             12.8     4.09  )-----------( 166      ( 07 Apr 2020 07:35 )               39.2         04-07        131<L>  |  90<L>  |  90<H>    ----------------------------<  78    4.2   |  23  |  7.75<H>        Ca    8.5      07 Apr 2020 07:35    Phos  3.5     04-07    Mg     2.1     04-07        TPro  8.0  /  Alb  3.6  /  TBili  0.3  /  DBili  x   /  AST  36  /  ALT  45<H>  /  AlkPhos  102  04-07                            RADIOLOGY, EKG & ADDITIONAL TESTS: Reviewed. 64 y/o Male with hx of DM, HTN, right BKA,, left AKA, ESRD on HD M/W/F at [facility] presenting to HD for missed dialysis. Patient's last HD was done on 3/25/20. Patient states that due to transportation issues he was not able to be taken to center. Patient found to be hyperkalemic to 7.9 in the setting of missed HD, and underwent urgent HD on 4/1, with improvement in his potassium. Patient then spiked a fever to 100.7, and tested positive for COVID 19. Patient underwent a subsequent session of HD on 4/2, 4/4, and 4/7. Per renal, patient is cleared for discharge.         After admission, his last fever was on 4/3 at 4am. He subsequently remained afebrile and on room air without respiratory distress. Throughout hospitalization, he was frequently hypoglycemic (FSG 70s-80s) - resolved with juice and now on standing additional glucose. Patient will be discharged with glucose gel three times a day with recommended outpatient follow-up. Also, labs indicated hyperkalemia - better with lokelma x1 and HD.         As an aside, patient reported acute on chronic back pain. On exam, there was a 1.5inch nodule. It was not draining, non-erythematous, mobile. Per patient, onset was prior to admission, non-pruritic. Possibly an epidermoid cyst. Rec outpatient follow-up with dermatology.        Upon discharge, patient will continue his regularly scheduled HD sessions on M,W,F.        .    LABS: (drawn prior to HD on 4/7)                             12.8     4.09  )-----------( 166      ( 07 Apr 2020 07:35 )               39.2         04-07        131<L>  |  90<L>  |  90<H>    ----------------------------<  78    4.2   |  23  |  7.75<H>        Ca    8.5      07 Apr 2020 07:35    Phos  3.5     04-07    Mg     2.1     04-07        TPro  8.0  /  Alb  3.6  /  TBili  0.3  /  DBili  x   /  AST  36  /  ALT  45<H>  /  AlkPhos  102  04-07                            RADIOLOGY, EKG & ADDITIONAL TESTS: Reviewed.

## 2020-04-02 NOTE — PROGRESS NOTE ADULT - PROBLEM SELECTOR PLAN 1
Pt missed dialysis, and was hyperkalemic to 7.2 on presentation. Pt also fluid overloaded on presentation.   - S/p HD (4/1) with improvement in potassium   - C/w renal diet   - C/w phoslo   - Monitor BMPs Pt missed dialysis, and was hyperkalemic to 7.2 on presentation. Pt also fluid overloaded on presentation.   - S/p HD (4/1) with improvement in potassium. Plan for HD again today (4/2). Pt will resume MWF HD schedule.   - C/w renal diet   - C/w phoslo   - Monitor BMPs

## 2020-04-02 NOTE — PROGRESS NOTE ADULT - SUBJECTIVE AND OBJECTIVE BOX
PROGRESS NOTE:   Authored by Chadd Harding MD, PGY 1, Pager [phone] [facility], [id number] [facility]     Patient is a 65y old  Male who presents with a chief complaint of     SUBJECTIVE / OVERNIGHT EVENTS:      MEDICATIONS  (STANDING):  aspirin enteric coated 81 milliGRAM(s) Oral daily  atorvastatin 40 milliGRAM(s) Oral at bedtime  calcium acetate 1334 milliGRAM(s) Oral three times a day with meals  chlorhexidine 4% Liquid 1 Application(s) Topical daily  dextrose 5%. 1000 milliLiter(s) (50 mL/Hr) IV Continuous <Continuous>  dextrose 50% Injectable 12.5 Gram(s) IV Push once  dextrose 50% Injectable 25 Gram(s) IV Push once  dextrose 50% Injectable 25 Gram(s) IV Push once  furosemide    Tablet 40 milliGRAM(s) Oral two times a day  heparin  Injectable 5000 Unit(s) SubCutaneous two times a day  insulin lispro (HumaLOG) corrective regimen sliding scale   SubCutaneous three times a day before meals  insulin lispro (HumaLOG) corrective regimen sliding scale   SubCutaneous at bedtime  labetalol 100 milliGRAM(s) Oral two times a day  lactobacillus acidophilus 1 Tablet(s) Oral daily  lactulose Syrup 10 Gram(s) Oral at bedtime  multivitamin 1 Tablet(s) Oral daily  NIFEdipine XL 60 milliGRAM(s) Oral at bedtime  senna 2 Tablet(s) Oral at bedtime    MEDICATIONS  (PRN):  acetaminophen   Tablet .. 650 milliGRAM(s) Oral every 6 hours PRN Temp greater or equal to 38C (100.4F), Moderate Pain (4 - 6)  dextrose 40% Gel 15 Gram(s) Oral once PRN Blood Glucose LESS THAN 70 milliGRAM(s)/deciliter  glucagon  Injectable 1 milliGRAM(s) IntraMuscular once PRN Glucose LESS THAN 70 milligrams/deciliter      CAPILLARY BLOOD GLUCOSE      POCT Blood Glucose.: 87 mg/dL (01 Apr 2020 22:26)  POCT Blood Glucose.: 90 mg/dL (01 Apr 2020 18:24)  POCT Blood Glucose.: 96 mg/dL (01 Apr 2020 17:45)  POCT Blood Glucose.: 76 mg/dL (01 Apr 2020 17:00)  POCT Blood Glucose.: 71 mg/dL (01 Apr 2020 16:52)  POCT Blood Glucose.: 81 mg/dL (01 Apr 2020 13:09)    I&O's Summary    01 Apr 2020 07:01  -  02 Apr 2020 07:00  --------------------------------------------------------  IN: 600 mL / OUT: 2400 mL / NET: -1800 mL        PHYSICAL EXAM:  Vital Signs Last 24 Hrs  T(C): 37.2 (02 Apr 2020 06:30), Max: 37.3 (01 Apr 2020 22:37)  T(F): 99 (02 Apr 2020 06:30), Max: 99.2 (01 Apr 2020 22:37)  HR: 66 (02 Apr 2020 06:30) (62 - 69)  BP: 169/66 (02 Apr 2020 06:30) (153/75 - 175/77)  BP(mean): --  RR: 16 (02 Apr 2020 06:30) (16 - 16)  SpO2: 98% (02 Apr 2020 06:30) (96% - 99%)    CONSTITUTIONAL: NAD  RESPIRATORY: Normal respiratory effort; lungs are clear to auscultation bilaterally  CARDIOVASCULAR: Regular rate and rhythm, normal S1 and S2, no murmur/rub/gallop  ABDOMEN: Nontender to palpation, Soft, Non-distended, normoactive bowel sounds,   MUSCLOSKELETAL: No LE edema   NEURO: Alert, good concentration     LABS:                        13.7   4.54  )-----------( 166      ( 01 Apr 2020 16:39 )             41.9     04-01    136  |  94<L>  |  65<H>  ----------------------------<  73  4.4   |  22  |  5.40<H>    Ca    9.3      01 Apr 2020 16:39  Phos  3.8     04-01  Mg     2.2     04-01    TPro  8.2  /  Alb  3.9  /  TBili  0.3  /  DBili  x   /  AST  36  /  ALT  38  /  AlkPhos  117  04-01                RADIOLOGY & ADDITIONAL TESTS:  Results Reviewed. PROGRESS NOTE:   Authored by Chadd Harding MD, PGY 1, Pager [phone] [facility], [address] [facility]     Patient is a 65y old  Male who presents with a chief complaint of     SUBJECTIVE / OVERNIGHT EVENTS: Pt seen and examined at bedside this AM. Pt denies chest pain, SOB, abd pain, nausea or vomiting.       MEDICATIONS  (STANDING):  aspirin enteric coated 81 milliGRAM(s) Oral daily  atorvastatin 40 milliGRAM(s) Oral at bedtime  calcium acetate 1334 milliGRAM(s) Oral three times a day with meals  chlorhexidine 4% Liquid 1 Application(s) Topical daily  dextrose 5%. 1000 milliLiter(s) (50 mL/Hr) IV Continuous <Continuous>  dextrose 50% Injectable 12.5 Gram(s) IV Push once  dextrose 50% Injectable 25 Gram(s) IV Push once  dextrose 50% Injectable 25 Gram(s) IV Push once  furosemide    Tablet 40 milliGRAM(s) Oral two times a day  heparin  Injectable 5000 Unit(s) SubCutaneous two times a day  insulin lispro (HumaLOG) corrective regimen sliding scale   SubCutaneous three times a day before meals  insulin lispro (HumaLOG) corrective regimen sliding scale   SubCutaneous at bedtime  labetalol 100 milliGRAM(s) Oral two times a day  lactobacillus acidophilus 1 Tablet(s) Oral daily  lactulose Syrup 10 Gram(s) Oral at bedtime  multivitamin 1 Tablet(s) Oral daily  NIFEdipine XL 60 milliGRAM(s) Oral at bedtime  senna 2 Tablet(s) Oral at bedtime    MEDICATIONS  (PRN):  acetaminophen   Tablet .. 650 milliGRAM(s) Oral every 6 hours PRN Temp greater or equal to 38C (100.4F), Moderate Pain (4 - 6)  dextrose 40% Gel 15 Gram(s) Oral once PRN Blood Glucose LESS THAN 70 milliGRAM(s)/deciliter  glucagon  Injectable 1 milliGRAM(s) IntraMuscular once PRN Glucose LESS THAN 70 milligrams/deciliter      CAPILLARY BLOOD GLUCOSE      POCT Blood Glucose.: 87 mg/dL (01 Apr 2020 22:26)  POCT Blood Glucose.: 90 mg/dL (01 Apr 2020 18:24)  POCT Blood Glucose.: 96 mg/dL (01 Apr 2020 17:45)  POCT Blood Glucose.: 76 mg/dL (01 Apr 2020 17:00)  POCT Blood Glucose.: 71 mg/dL (01 Apr 2020 16:52)  POCT Blood Glucose.: 81 mg/dL (01 Apr 2020 13:09)    I&O's Summary    01 Apr 2020 07:01  -  02 Apr 2020 07:00  --------------------------------------------------------  IN: 600 mL / OUT: 2400 mL / NET: -1800 mL        PHYSICAL EXAM:  Vital Signs Last 24 Hrs  T(C): 37.2 (02 Apr 2020 06:30), Max: 37.3 (01 Apr 2020 22:37)  T(F): 99 (02 Apr 2020 06:30), Max: 99.2 (01 Apr 2020 22:37)  HR: 66 (02 Apr 2020 06:30) (62 - 69)  BP: 169/66 (02 Apr 2020 06:30) (153/75 - 175/77)  BP(mean): --  RR: 16 (02 Apr 2020 06:30) (16 - 16)  SpO2: 98% (02 Apr 2020 06:30) (96% - 99%)    CONSTITUTIONAL: NAD  RESPIRATORY: Normal respiratory effort; lungs are clear to auscultation bilaterally  CARDIOVASCULAR: Regular rate and rhythm, normal S1 and S2, no murmur/rub/gallop  ABDOMEN: Nontender to palpation, Soft, Non-distended, normoactive bowel sounds,   MUSCLOSKELETAL: right BKA, left AKA, no edema   NEURO: Alert, good concentration     LABS:                        13.7   4.54  )-----------( 166      ( 01 Apr 2020 16:39 )             41.9     04-01    136  |  94<L>  |  65<H>  ----------------------------<  73  4.4   |  22  |  5.40<H>    Ca    9.3      01 Apr 2020 16:39  Phos  3.8     04-01  Mg     2.2     04-01    TPro  8.2  /  Alb  3.9  /  TBili  0.3  /  DBili  x   /  AST  36  /  ALT  38  /  AlkPhos  117  04-01                RADIOLOGY & ADDITIONAL TESTS:  Results Reviewed.

## 2020-04-02 NOTE — PROGRESS NOTE ADULT - ASSESSMENT
64 y/o M with hx of DM, HTN, right BKA,, left AKA, ESRD on HD M/W/F via AVF at [facility] presenting for missed dialysis    ESRD on HD via AVF MWF  Pt seen on IHD Today. Tolerating HD well. Vitals stable. Access functioning well. Pt without complaints.   Pt cleared for discharge after HD today   consent in chart  renal diet  monitor electrolyte    Hyperkalemia  sec to missed hd  monitor    Anemia  at goal  no need for epo  monitor hb    HTN  Controlled today   uf with hd  monitor    Ckd-mbd  pth, phos are at goal   monitor calcium and phos daily

## 2020-04-02 NOTE — PROGRESS NOTE ADULT - SUBJECTIVE AND OBJECTIVE BOX
[facility]ROLOGY PRACT[facility]   MD Nathaniel Kendall MD, D.O. Ruoru Wong, PA    From 7 AM - 5 PM:  OFFICE: [phone]  Dr. Navarro cell: [phone]  Dr. Karimi cell: [phone]  Dr. Garza cell: [phone]  BECKI Sam cell: [phone]    From 5 PM - 7 AM: Answering Service: [phone]      RENAL FOLLOW UP NOTE--------------------------------------------------------------------------------  HPI: Pt seen on IHD Today. Tolerating HD well. Vitals stable. Access functioning well. Pt without complaints.     PAST HISTORY  --------------------------------------------------------------------------------  No significant changes to PMH, PSH, FHx, SHx, unless otherwise noted    ALLERGIES & MEDICATIONS  --------------------------------------------------------------------------------  Allergies    No Known Allergies    Intolerances      Standing Inpatient Medications  aspirin enteric coated 81 milliGRAM(s) Oral daily  atorvastatin 40 milliGRAM(s) Oral at bedtime  calcium acetate 1334 milliGRAM(s) Oral three times a day with meals  chlorhexidine 4% Liquid 1 Application(s) Topical daily  dextrose 5%. 1000 milliLiter(s) IV Continuous <Continuous>  dextrose 50% Injectable 12.5 Gram(s) IV Push once  dextrose 50% Injectable 25 Gram(s) IV Push once  dextrose 50% Injectable 25 Gram(s) IV Push once  furosemide    Tablet 40 milliGRAM(s) Oral two times a day  heparin  Injectable 5000 Unit(s) SubCutaneous two times a day  insulin lispro (HumaLOG) corrective regimen sliding scale   SubCutaneous three times a day before meals  insulin lispro (HumaLOG) corrective regimen sliding scale   SubCutaneous at bedtime  labetalol 100 milliGRAM(s) Oral two times a day  lactobacillus acidophilus 1 Tablet(s) Oral daily  lactulose Syrup 10 Gram(s) Oral at bedtime  multivitamin 1 Tablet(s) Oral daily  NIFEdipine XL 60 milliGRAM(s) Oral at bedtime  senna 2 Tablet(s) Oral at bedtime    PRN Inpatient Medications  acetaminophen   Tablet .. 650 milliGRAM(s) Oral every 6 hours PRN  dextrose 40% Gel 15 Gram(s) Oral once PRN  glucagon  Injectable 1 milliGRAM(s) IntraMuscular once PRN      REVIEW OF SYSTEMS  --------------------------------------------------------------------------------  General: no fever  CVS: no chest pain  RESP: no sob, no cough  ABD: no abdominal pain  : no dysuria  MSK: no edema     VITALS/PHYSICAL EXAM  --------------------------------------------------------------------------------  T(C): 37.7 (04-02-20 @ 11:10), Max: 37.7 (04-02-20 @ 11:10)  HR: 68 (04-02-20 @ 11:10) (64 - 69)  BP: 146/65 (04-02-20 @ 11:10) (146/65 - 169/66)  RR: 16 (04-02-20 @ 11:10) (16 - 16)  SpO2: 98% (04-02-20 @ 06:30) (96% - 99%)  Wt(kg): --    04-01-20 @ 07:01  -  04-02-20 @ 07:00  --------------------------------------------------------  IN: 600 mL / OUT: 2400 mL / NET: -1800 mL      Physical Exam:  	Gen: NAD  	HEENT: MMM  	Pulm: CTA B/L  	CV: S1S2  	Abd: Soft, +BS  	Ext: No LE edema B/L + BKA +AKA                      Neuro: Awake   	Skin: Warm and Dry   	Vascular access: +AVF    LABS/STUDIES  --------------------------------------------------------------------------------              13.7   4.54  >-----------<  166      [04-01-20 @ 16:39]              41.9     136  |  94  |  65  ----------------------------<  73      [04-01-20 @ 16:39]  4.4   |  22  |  5.40        Ca     9.3     [04-01-20 @ 16:39]      Mg     2.2     [04-01-20 @ 16:39]      Phos  3.8     [04-01-20 @ 16:39]    TPro  8.2  /  Alb  3.9  /  TBili  0.3  /  DBili  x   /  AST  36  /  ALT  38  /  AlkPhos  117  [04-01-20 @ 04:30]    Creatinine Trend:  SCr 5.40 [04-01 @ 16:39]  SCr 10.27 [04-01 @ 09:00]  SCr 9.47 [04-01 @ 04:30]    .3 (Ca --)      [04-01-20 @ 11:03]   --  HbA1c 4.2      [07-19-19 @ 09:56]    HBsAg NEGATIVE      [04-01-20 @ 11:03]

## 2020-04-02 NOTE — DISCHARGE NOTE PROVIDER - NSDCMRMEDTOKEN_GEN_ALL_CORE_FT
acetaminophen 325 mg oral tablet: 2 tab(s) orally every 6 hours, As needed, Moderate Pain (4 - 6)  aspirin 81 mg oral delayed release tablet: 1 tab(s) orally once a day  atorvastatin 40 mg oral tablet: 1 tab(s) orally once a day  calcium acetate 667 mg oral tablet: 3 tab(s) orally 3 times a day  furosemide 40 mg oral tablet: 1 tab(s) orally 2 times a day  Januvia 25 mg oral tablet: 1 tab(s) orally once a day  labetalol 100 mg oral tablet: 1 tab(s) orally 2 times a day  lactobacillus acidophilus oral capsule:  orally   lactulose 10 g/15 mL oral syrup: 15 milliliter(s) orally once a day (at bedtime)  Multiple Vitamins oral tablet: 1 tab(s) orally once a day  NIFEdipine 60 mg oral tablet, extended release: 2 tab(s) orally once a day (at bedtime)  oxycodone-acetaminophen 5 mg-325 mg oral tablet: 1 tab(s) orally every 4 hours, As needed, Severe Pain (7 - 10)  senna oral tablet: 2 tab(s) orally once a day (at bedtime) acetaminophen 325 mg oral tablet: 2 tab(s) orally every 6 hours, As needed, Moderate Pain (4 - 6)  aspirin 81 mg oral delayed release tablet: 1 tab(s) orally once a day  atorvastatin 40 mg oral tablet: 1 tab(s) orally once a day  calcium acetate 667 mg oral tablet: 3 tab(s) orally 3 times a day  furosemide 40 mg oral tablet: 1 tab(s) orally 2 times a day  Glutose 15 oral gel: 15 gram(s) orally 3 times a day  labetalol 100 mg oral tablet: 1 tab(s) orally 2 times a day  lactobacillus acidophilus oral capsule:  orally   lactulose 10 g/15 mL oral syrup: 15 milliliter(s) orally once a day (at bedtime)  lidocaine 5% topical film: Apply topically to affected area on back once a day, As Needed  Multiple Vitamins oral tablet: 1 tab(s) orally once a day  NIFEdipine 60 mg oral tablet, extended release: 2 tab(s) orally once a day (at bedtime)  oxycodone-acetaminophen 5 mg-325 mg oral tablet: 1 tab(s) orally every 4 hours, As needed, Severe Pain (7 - 10)  senna oral tablet: 2 tab(s) orally once a day (at bedtime)

## 2020-04-02 NOTE — DISCHARGE NOTE PROVIDER - NSDCFUADDAPPT_GEN_ALL_CORE_FT
Follow up with your Nephrologist within 1-2 weeks of discharge.     Continue to go to your Dialysis sessions on Monday, Wednesday and Fridays.

## 2020-04-02 NOTE — PROGRESS NOTE ADULT - ASSESSMENT
Patient is a 64 y/o M with hx of DM, HTN, right BKA,, left AKA, ESRD on HD M/W/F at [facility] presenting for missed dialysis.

## 2020-04-02 NOTE — DISCHARGE NOTE PROVIDER - NSDCCPCAREPLAN_GEN_ALL_CORE_FT
PRINCIPAL DISCHARGE DIAGNOSIS  Diagnosis: CKD (chronic kidney disease)  Assessment and Plan of Treatment: You were admitted to the hospital because you had elevated potassium in the setting of your missed Dialysis session. You underwent 2 sessions of dialysis while you were hospitalized. You will continue your regularly scheduled dialysis session on Mondays, Wednesday and Fridays. You should follow up with your Nephrologist within 1-2 weeks of discharge. PRINCIPAL DISCHARGE DIAGNOSIS  Diagnosis: CKD (chronic kidney disease)  Assessment and Plan of Treatment: You were admitted to the hospital because you had elevated potassium in the setting of your missed Dialysis session. You underwent 2 sessions of dialysis while you were hospitalized. You will continue your regularly scheduled dialysis session on Mondays, Wednesday and Fridays. You should follow up with your Nephrologist within 1-2 weeks of discharge.      SECONDARY DISCHARGE DIAGNOSES  Diagnosis: Infection due to 2019 novel coronavirus  Assessment and Plan of Treatment: You had a fever while in the hospital, and you were tested for the 2019 novel coronavirus, or COVID-19. This test was positive. You had very mild symptoms, with a fever and cough. Your condition was monitored, but you did not require treatment. If you develop difficulty breathing or fevers, you should come to the hospital or call your doctor. Please follow up with your primary car provider in 1-2 weeks after discharge. PRINCIPAL DISCHARGE DIAGNOSIS  Diagnosis: CKD (chronic kidney disease)  Assessment and Plan of Treatment: You were admitted to the hospital because you had elevated potassium in the setting of your missed Dialysis session. You underwent 4 sessions of dialysis while you were hospitalized. You will continue your regularly scheduled dialysis session on Mondays, Wednesday and Fridays. You should follow up with your Nephrologist within 1-2 weeks of discharge.      SECONDARY DISCHARGE DIAGNOSES  Diagnosis: Infection due to 2019 novel coronavirus  Assessment and Plan of Treatment: You had a fever while in the hospital, and you were tested for the 2019 novel coronavirus, or COVID-19. This test was positive. You had very mild symptoms, with a fever and cough. Your condition was monitored, but you did not require treatment. If you develop difficulty breathing or fevers, you should come to the hospital or call your doctor. Please follow up with your primary car provider in 1-2 weeks after discharge.    Diagnosis: Subcutaneous mass of back  Assessment and Plan of Treatment: You had acute pain due to a chronic nodule on your back. You were given lidocaine patches to be used daily on the site for management of the pain. Immediate management not necessary at this time. Please follow-up with your outpatient provider to evaluate.

## 2020-04-03 DIAGNOSIS — R50.9 FEVER, UNSPECIFIED: ICD-10-CM

## 2020-04-03 LAB
ANION GAP SERPL CALC-SCNC: 17 MMO/L — HIGH (ref 7–14)
BUN SERPL-MCNC: 54 MG/DL — HIGH (ref 7–23)
CALCIUM SERPL-MCNC: 9.7 MG/DL — SIGNIFICANT CHANGE UP (ref 8.4–10.5)
CHLORIDE SERPL-SCNC: 95 MMOL/L — LOW (ref 98–107)
CO2 SERPL-SCNC: 24 MMOL/L — SIGNIFICANT CHANGE UP (ref 22–31)
CREAT SERPL-MCNC: 5.57 MG/DL — HIGH (ref 0.5–1.3)
GLUCOSE SERPL-MCNC: 114 MG/DL — HIGH (ref 70–99)
HCT VFR BLD CALC: 41 % — SIGNIFICANT CHANGE UP (ref 39–50)
HGB BLD-MCNC: 13.1 G/DL — SIGNIFICANT CHANGE UP (ref 13–17)
MAGNESIUM SERPL-MCNC: 2.1 MG/DL — SIGNIFICANT CHANGE UP (ref 1.6–2.6)
MCHC RBC-ENTMCNC: 29 PG — SIGNIFICANT CHANGE UP (ref 27–34)
MCHC RBC-ENTMCNC: 32 % — SIGNIFICANT CHANGE UP (ref 32–36)
MCV RBC AUTO: 90.9 FL — SIGNIFICANT CHANGE UP (ref 80–100)
NRBC # FLD: 0 K/UL — SIGNIFICANT CHANGE UP (ref 0–0)
PHOSPHATE SERPL-MCNC: 3.9 MG/DL — SIGNIFICANT CHANGE UP (ref 2.5–4.5)
PLATELET # BLD AUTO: 163 K/UL — SIGNIFICANT CHANGE UP (ref 150–400)
PMV BLD: 10.9 FL — SIGNIFICANT CHANGE UP (ref 7–13)
POTASSIUM SERPL-MCNC: 4.4 MMOL/L — SIGNIFICANT CHANGE UP (ref 3.5–5.3)
POTASSIUM SERPL-SCNC: 4.4 MMOL/L — SIGNIFICANT CHANGE UP (ref 3.5–5.3)
RBC # BLD: 4.51 M/UL — SIGNIFICANT CHANGE UP (ref 4.2–5.8)
RBC # FLD: 14.1 % — SIGNIFICANT CHANGE UP (ref 10.3–14.5)
SODIUM SERPL-SCNC: 136 MMOL/L — SIGNIFICANT CHANGE UP (ref 135–145)
WBC # BLD: 3.43 K/UL — LOW (ref 3.8–10.5)
WBC # FLD AUTO: 3.43 K/UL — LOW (ref 3.8–10.5)

## 2020-04-03 PROCEDURE — 71045 X-RAY EXAM CHEST 1 VIEW: CPT | Mod: 26

## 2020-04-03 RX ORDER — LABETALOL HCL 100 MG
100 TABLET ORAL
Refills: 0 | Status: DISCONTINUED | OUTPATIENT
Start: 2020-04-03 | End: 2020-04-08

## 2020-04-03 RX ADMIN — Medication 1334 MILLIGRAM(S): at 11:35

## 2020-04-03 RX ADMIN — CHLORHEXIDINE GLUCONATE 1 APPLICATION(S): 213 SOLUTION TOPICAL at 11:35

## 2020-04-03 RX ADMIN — HEPARIN SODIUM 5000 UNIT(S): 5000 INJECTION INTRAVENOUS; SUBCUTANEOUS at 04:36

## 2020-04-03 RX ADMIN — Medication 1 TABLET(S): at 11:35

## 2020-04-03 RX ADMIN — Medication 100 MILLIGRAM(S): at 04:37

## 2020-04-03 RX ADMIN — Medication 81 MILLIGRAM(S): at 11:35

## 2020-04-03 RX ADMIN — ATORVASTATIN CALCIUM 40 MILLIGRAM(S): 80 TABLET, FILM COATED ORAL at 22:39

## 2020-04-03 RX ADMIN — Medication 40 MILLIGRAM(S): at 18:15

## 2020-04-03 RX ADMIN — Medication 60 MILLIGRAM(S): at 22:39

## 2020-04-03 RX ADMIN — Medication 40 MILLIGRAM(S): at 04:37

## 2020-04-03 RX ADMIN — Medication 650 MILLIGRAM(S): at 04:37

## 2020-04-03 RX ADMIN — Medication 1334 MILLIGRAM(S): at 09:33

## 2020-04-03 RX ADMIN — HEPARIN SODIUM 5000 UNIT(S): 5000 INJECTION INTRAVENOUS; SUBCUTANEOUS at 18:15

## 2020-04-03 RX ADMIN — LACTULOSE 10 GRAM(S): 10 SOLUTION ORAL at 22:39

## 2020-04-03 RX ADMIN — Medication 1334 MILLIGRAM(S): at 18:15

## 2020-04-03 RX ADMIN — Medication 100 MILLIGRAM(S): at 18:15

## 2020-04-03 NOTE — PROGRESS NOTE ADULT - SUBJECTIVE AND OBJECTIVE BOX
[facility] [place] [place] [facility]ROLOGY PRACT[facility]   MD JEANNETTE CARRION DO ANAM SIDDIQUI ANGELA WONG, PA    TEL:  OFFICE: [phone]  DR ROSENBERG CELL: [phone]  DR. HANSEN CELL: [phone]  DR. MANDEL CELL: [phone]  YONNY WEBB CELL: [phone]        Patient is a 65y old  Male who presents with a chief complaint of     pt not seen. + fever need to r/o covid    VITALS:  T(F): 100.7 (04-03-20 @ 04:31), Max: 100.7 (04-03-20 @ 04:31)  HR: 73 (04-03-20 @ 04:31)  BP: 126/57 (04-03-20 @ 04:31)  RR: 18 (04-03-20 @ 04:31)  SpO2: 96% (04-03-20 @ 04:31)  Wt(kg): --    04-02 @ 07:01  -  04-03 @ 07:00  --------------------------------------------------------  IN: 400 mL / OUT: 900 mL / NET: -500 mL        Hospital Medications:   MEDICATIONS  (STANDING):  aspirin enteric coated 81 milliGRAM(s) Oral daily  atorvastatin 40 milliGRAM(s) Oral at bedtime  calcium acetate 1334 milliGRAM(s) Oral three times a day with meals  chlorhexidine 4% Liquid 1 Application(s) Topical daily  dextrose 5%. 1000 milliLiter(s) (50 mL/Hr) IV Continuous <Continuous>  dextrose 50% Injectable 12.5 Gram(s) IV Push once  dextrose 50% Injectable 25 Gram(s) IV Push once  dextrose 50% Injectable 25 Gram(s) IV Push once  furosemide    Tablet 40 milliGRAM(s) Oral two times a day  heparin  Injectable 5000 Unit(s) SubCutaneous two times a day  insulin lispro (HumaLOG) corrective regimen sliding scale   SubCutaneous three times a day before meals  insulin lispro (HumaLOG) corrective regimen sliding scale   SubCutaneous at bedtime  labetalol 100 milliGRAM(s) Oral two times a day  lactobacillus acidophilus 1 Tablet(s) Oral daily  lactulose Syrup 10 Gram(s) Oral at bedtime  multivitamin 1 Tablet(s) Oral daily  NIFEdipine XL 60 milliGRAM(s) Oral at bedtime  senna 2 Tablet(s) Oral at bedtime      LABS:  04-03    136  |  95<L>  |  54<H>  ----------------------------<  114<H>  4.4   |  24  |  5.57<H>    Ca    9.7      03 Apr 2020 07:00  Phos  3.9     04-03  Mg     2.1     04-03      Creatinine Trend: 5.57 <--, 6.67 <--, 5.40 <--, 10.27 <--, 9.47 <--    Phosphorus Level, Serum: 3.9 mg/dL (04-03 @ 07:00)                              13.1   3.43  )-----------( 163      ( 03 Apr 2020 07:00 )             41.0     Urine Studies:      .3 (Ca --)      [04-01-20 @ 11:03]   --  HbA1c 4.2      [07-19-19 @ 09:56]    HBsAg NEGATIVE      [04-01-20 @ 11:03]      RADIOLOGY & ADDITIONAL STUDIES:

## 2020-04-03 NOTE — PROGRESS NOTE ADULT - ASSESSMENT
66 y/o M with hx of DM, HTN, right BKA,, left AKA, ESRD on HD M/W/F via AVF at [facility] presenting for missed dialysis    ESRD on HD via AVF MWF  s/p HD 4/2  will continue MWF schedule   consent in chart  renal diet  monitor electrolyte    Hyperkalemia  sec to missed hd  better  IHD per schedule   monitor    Anemia  at goal  no need for epo  monitor hb    HTN  Controlled today   uf with hd  monitor    Ckd-mbd  pth, phos are at goal   monitor calcium and phos daily    FEver  new fever overnight  pending cx  Need to r/o COVID. pt is from nursing home. will need to set up dialysis in covid + unit if positive once is medically cleared. 64 y/o M with hx of DM, HTN, right BKA,, left AKA, ESRD on HD M/W/F via AVF at [facility] presenting for missed dialysis    ESRD on HD via AVF MWF  s/p HD 4/2  will continue MWF schedule   consent in chart  renal diet  monitor electrolyte    Hyperkalemia  sec to missed hd  better  IHD per schedule   monitor    Anemia  at goal  no need for epo  monitor hb    HTN  Controlled today   uf with hd  monitor    Ckd-mbd  pth, phos are at goal   monitor calcium and phos daily    FEver  new fever overnight  pending cx  Need to r/o COVID. pt is from nursing home. will need to set up dialysis in covid + unit if positive once is medically cleared.  plan d/w team 64 y/o M with hx of DM, HTN, right BKA,, left AKA, ESRD on HD TTS via AVF at [facility] presenting for missed dialysis    ESRD on HD via AVF TTS  s/p HD 4/2  will continue TTS schedule   consent in chart  renal diet  monitor electrolyte    Hyperkalemia  sec to missed hd  better  IHD per schedule   monitor    Anemia  at goal  no need for epo  monitor hb    HTN  Controlled today   uf with hd  monitor    Ckd-mbd  pth, phos are at goal   monitor calcium and phos daily    FEver  new fever overnight  pending cx  Need to r/o COVID. pt is from nursing home. will need to set up dialysis in covid + unit if positive once is medically cleared.  plan d/w team

## 2020-04-03 NOTE — PROGRESS NOTE ADULT - PROBLEM SELECTOR PLAN 2
Febrile to 100.7 overnight. No localizing symptoms.   - will order blood cultures and UA  - continue to monitor, will hold of on antibiotics for now Febrile to 100.7 overnight. No localizing symptoms.   - will order blood cultures and UA  - will r/o COVID   - continue to monitor, will hold of on antibiotics for now

## 2020-04-03 NOTE — PROGRESS NOTE ADULT - ASSESSMENT
Patient is a 66 y/o M with hx of DM, HTN, right BKA,, left AKA, ESRD on HD M/W/F at [facility] presenting for missed dialysis.

## 2020-04-03 NOTE — PROGRESS NOTE ADULT - SUBJECTIVE AND OBJECTIVE BOX
Yoon Manriquez M.D.   PGY-3 | Internal Medicine   [phone] | [id number]        Patient is a 65y old  Male who presents with a chief complaint of       SUBJECTIVE / OVERNIGHT EVENTS: Patient febrile to 100.7 overnight. Patient seen and examined at bedside this morning.     ROS: [ - ] Fever [ - ] Chills [ - ] Nausea/Vomiting [ - ] Chest Pain [ - ] Shortness of breath     MEDICATIONS  (STANDING):  aspirin enteric coated 81 milliGRAM(s) Oral daily  atorvastatin 40 milliGRAM(s) Oral at bedtime  calcium acetate 1334 milliGRAM(s) Oral three times a day with meals  chlorhexidine 4% Liquid 1 Application(s) Topical daily  dextrose 5%. 1000 milliLiter(s) (50 mL/Hr) IV Continuous <Continuous>  dextrose 50% Injectable 12.5 Gram(s) IV Push once  dextrose 50% Injectable 25 Gram(s) IV Push once  dextrose 50% Injectable 25 Gram(s) IV Push once  furosemide    Tablet 40 milliGRAM(s) Oral two times a day  heparin  Injectable 5000 Unit(s) SubCutaneous two times a day  insulin lispro (HumaLOG) corrective regimen sliding scale   SubCutaneous three times a day before meals  insulin lispro (HumaLOG) corrective regimen sliding scale   SubCutaneous at bedtime  labetalol 100 milliGRAM(s) Oral two times a day  lactobacillus acidophilus 1 Tablet(s) Oral daily  lactulose Syrup 10 Gram(s) Oral at bedtime  multivitamin 1 Tablet(s) Oral daily  NIFEdipine XL 60 milliGRAM(s) Oral at bedtime  senna 2 Tablet(s) Oral at bedtime    MEDICATIONS  (PRN):  acetaminophen   Tablet .. 650 milliGRAM(s) Oral every 6 hours PRN Temp greater or equal to 38C (100.4F), Moderate Pain (4 - 6)  dextrose 40% Gel 15 Gram(s) Oral once PRN Blood Glucose LESS THAN 70 milliGRAM(s)/deciliter  glucagon  Injectable 1 milliGRAM(s) IntraMuscular once PRN Glucose LESS THAN 70 milligrams/deciliter      Vital Signs Last 24 Hrs  T(C): 38.2 (03 Apr 2020 04:31), Max: 38.2 (03 Apr 2020 04:31)  T(F): 100.7 (03 Apr 2020 04:31), Max: 100.7 (03 Apr 2020 04:31)  HR: 73 (03 Apr 2020 04:31) (63 - 75)  BP: 126/57 (03 Apr 2020 04:31) (126/57 - 169/64)  BP(mean): --  RR: 18 (03 Apr 2020 04:31) (16 - 18)  SpO2: 96% (03 Apr 2020 04:31) (96% - 98%)  CAPILLARY BLOOD GLUCOSE      POCT Blood Glucose.: 102 mg/dL (02 Apr 2020 22:41)  POCT Blood Glucose.: 91 mg/dL (02 Apr 2020 17:31)  POCT Blood Glucose.: 91 mg/dL (02 Apr 2020 12:40)  POCT Blood Glucose.: 176 mg/dL (02 Apr 2020 09:59)  POCT Blood Glucose.: 199 mg/dL (02 Apr 2020 09:55)  POCT Blood Glucose.: 78 mg/dL (02 Apr 2020 09:32)  POCT Blood Glucose.: 59 mg/dL (02 Apr 2020 08:43)  POCT Blood Glucose.: 57 mg/dL (02 Apr 2020 08:37)    I&O's Summary    02 Apr 2020 07:01  -  03 Apr 2020 07:00  --------------------------------------------------------  IN: 400 mL / OUT: 900 mL / NET: -500 mL        PHYSICAL EXAM  GENERAL: NAD, lying comfortably in bed   HEAD:  Atraumatic, Normocephalic  EYES: EOMI, PERRLA, conjunctiva and sclera clear  NECK: Supple, No JVD  CHEST/LUNG: Clear to auscultation bilaterally; No wheeze  HEART: Regular rate and rhythm; No murmurs, rubs, or gallops  ABDOMEN: Soft, Nontender, Nondistended; Bowel sounds present  EXTREMITIES:  2+ Peripheral Pulses, right BKA, left AKA  NEURO: AAOx3, non-focal  SKIN: No rashes or lesions      LABS:                        12.5   3.79  )-----------( 160      ( 02 Apr 2020 11:10 )             38.2     04-02    132<L>  |  92<L>  |  80<H>  ----------------------------<  110<H>  4.7   |  23  |  6.67<H>    Ca    9.1      02 Apr 2020 11:10  Phos  5.1     04-02  Mg     2.2     04-02                    Consultant(s) Notes Reviewed:  Renal

## 2020-04-03 NOTE — PROGRESS NOTE ADULT - PROBLEM SELECTOR PLAN 1
Pt missed dialysis, and was hyperkalemic to 7.2 on presentation. Pt also fluid overloaded on presentation.   - S/p HD (4/1) with improvement in potassium. Plan for HD again today (4/2). Pt will resume MWF HD schedule.   - C/w renal diet   - C/w phoslo   - Monitor BMPs Pt missed dialysis, and was hyperkalemic to 7.2 on presentation. Pt also fluid overloaded on presentation.   - S/p HD (4/1) and (4/2) with improvement in potassium.   - Plan is to resume M,W, F schedule   - C/w renal diet   - C/w phoslo   - Monitor BMPs

## 2020-04-04 DIAGNOSIS — B34.2 CORONAVIRUS INFECTION, UNSPECIFIED: ICD-10-CM

## 2020-04-04 LAB
ANION GAP SERPL CALC-SCNC: 19 MMO/L — HIGH (ref 7–14)
BASOPHILS # BLD AUTO: 0.03 K/UL — SIGNIFICANT CHANGE UP (ref 0–0.2)
BASOPHILS NFR BLD AUTO: 1 % — SIGNIFICANT CHANGE UP (ref 0–2)
BUN SERPL-MCNC: 76 MG/DL — HIGH (ref 7–23)
CALCIUM SERPL-MCNC: 9.5 MG/DL — SIGNIFICANT CHANGE UP (ref 8.4–10.5)
CHLORIDE SERPL-SCNC: 89 MMOL/L — LOW (ref 98–107)
CO2 SERPL-SCNC: 24 MMOL/L — SIGNIFICANT CHANGE UP (ref 22–31)
CREAT SERPL-MCNC: 7.04 MG/DL — HIGH (ref 0.5–1.3)
CULTURE RESULTS: SIGNIFICANT CHANGE UP
EOSINOPHIL # BLD AUTO: 0.08 K/UL — SIGNIFICANT CHANGE UP (ref 0–0.5)
EOSINOPHIL NFR BLD AUTO: 2.7 % — SIGNIFICANT CHANGE UP (ref 0–6)
GLUCOSE SERPL-MCNC: 58 MG/DL — LOW (ref 70–99)
HCT VFR BLD CALC: 43.8 % — SIGNIFICANT CHANGE UP (ref 39–50)
HGB BLD-MCNC: 13.6 G/DL — SIGNIFICANT CHANGE UP (ref 13–17)
IMM GRANULOCYTES NFR BLD AUTO: 1.3 % — SIGNIFICANT CHANGE UP (ref 0–1.5)
LYMPHOCYTES # BLD AUTO: 1.01 K/UL — SIGNIFICANT CHANGE UP (ref 1–3.3)
LYMPHOCYTES # BLD AUTO: 33.6 % — SIGNIFICANT CHANGE UP (ref 13–44)
MAGNESIUM SERPL-MCNC: 2.3 MG/DL — SIGNIFICANT CHANGE UP (ref 1.6–2.6)
MCHC RBC-ENTMCNC: 28.3 PG — SIGNIFICANT CHANGE UP (ref 27–34)
MCHC RBC-ENTMCNC: 31.1 % — LOW (ref 32–36)
MCV RBC AUTO: 91.3 FL — SIGNIFICANT CHANGE UP (ref 80–100)
MONOCYTES # BLD AUTO: 0.45 K/UL — SIGNIFICANT CHANGE UP (ref 0–0.9)
MONOCYTES NFR BLD AUTO: 15 % — HIGH (ref 2–14)
NEUTROPHILS # BLD AUTO: 1.4 K/UL — LOW (ref 1.8–7.4)
NEUTROPHILS NFR BLD AUTO: 46.4 % — SIGNIFICANT CHANGE UP (ref 43–77)
NRBC # FLD: 0 K/UL — SIGNIFICANT CHANGE UP (ref 0–0)
PHOSPHATE SERPL-MCNC: 6 MG/DL — HIGH (ref 2.5–4.5)
PLATELET # BLD AUTO: 165 K/UL — SIGNIFICANT CHANGE UP (ref 150–400)
PMV BLD: 10.6 FL — SIGNIFICANT CHANGE UP (ref 7–13)
POTASSIUM SERPL-MCNC: 5.4 MMOL/L — HIGH (ref 3.5–5.3)
POTASSIUM SERPL-SCNC: 5.4 MMOL/L — HIGH (ref 3.5–5.3)
RBC # BLD: 4.8 M/UL — SIGNIFICANT CHANGE UP (ref 4.2–5.8)
RBC # FLD: 14 % — SIGNIFICANT CHANGE UP (ref 10.3–14.5)
SARS-COV-2 RNA SPEC QL NAA+PROBE: DETECTED
SODIUM SERPL-SCNC: 132 MMOL/L — LOW (ref 135–145)
SPECIMEN SOURCE: SIGNIFICANT CHANGE UP
WBC # BLD: 3.01 K/UL — LOW (ref 3.8–10.5)
WBC # FLD AUTO: 3.01 K/UL — LOW (ref 3.8–10.5)

## 2020-04-04 RX ORDER — HEPARIN SODIUM 5000 [USP'U]/ML
5000 INJECTION INTRAVENOUS; SUBCUTANEOUS THREE TIMES A DAY
Refills: 0 | Status: DISCONTINUED | OUTPATIENT
Start: 2020-04-04 | End: 2020-04-08

## 2020-04-04 RX ADMIN — Medication 1334 MILLIGRAM(S): at 14:23

## 2020-04-04 RX ADMIN — HEPARIN SODIUM 5000 UNIT(S): 5000 INJECTION INTRAVENOUS; SUBCUTANEOUS at 23:10

## 2020-04-04 RX ADMIN — Medication 100 MILLIGRAM(S): at 17:49

## 2020-04-04 RX ADMIN — HEPARIN SODIUM 5000 UNIT(S): 5000 INJECTION INTRAVENOUS; SUBCUTANEOUS at 06:00

## 2020-04-04 RX ADMIN — Medication 1334 MILLIGRAM(S): at 17:49

## 2020-04-04 RX ADMIN — Medication 40 MILLIGRAM(S): at 06:01

## 2020-04-04 RX ADMIN — Medication 40 MILLIGRAM(S): at 17:49

## 2020-04-04 RX ADMIN — Medication 1 TABLET(S): at 14:23

## 2020-04-04 RX ADMIN — LACTULOSE 10 GRAM(S): 10 SOLUTION ORAL at 23:10

## 2020-04-04 RX ADMIN — Medication 1 TABLET(S): at 17:57

## 2020-04-04 RX ADMIN — Medication 81 MILLIGRAM(S): at 14:24

## 2020-04-04 RX ADMIN — Medication 1334 MILLIGRAM(S): at 09:27

## 2020-04-04 RX ADMIN — Medication 60 MILLIGRAM(S): at 23:10

## 2020-04-04 RX ADMIN — HEPARIN SODIUM 5000 UNIT(S): 5000 INJECTION INTRAVENOUS; SUBCUTANEOUS at 14:23

## 2020-04-04 RX ADMIN — ATORVASTATIN CALCIUM 40 MILLIGRAM(S): 80 TABLET, FILM COATED ORAL at 23:10

## 2020-04-04 RX ADMIN — SENNA PLUS 2 TABLET(S): 8.6 TABLET ORAL at 23:10

## 2020-04-04 RX ADMIN — CHLORHEXIDINE GLUCONATE 1 APPLICATION(S): 213 SOLUTION TOPICAL at 14:25

## 2020-04-04 NOTE — PROGRESS NOTE ADULT - ASSESSMENT
64 y/o M with hx of DM, HTN, right BKA,, left AKA, ESRD on HD TTS via AVF at [facility] presenting for missed dialysis    ESRD on HD via AVF TTS  will continue TTS schedule   consent in chart  renal diet  monitor electrolyte  Has to be planed for discharge in a COVID positive HD unit post HD. His HD unit ([facility]) will make the arrangement but needs to be cleared before discharge    Hyperkalemia  sec to missed hd  better  IHD per schedule   monitor    Anemia  at goal  no need for epo  monitor hb    HTN  Controlled today   uf with hd  monitor    Ckd-mbd  pth, phos are at goal   monitor calcium and phos daily    FEver  COVID positive

## 2020-04-04 NOTE — PROGRESS NOTE ADULT - SUBJECTIVE AND OBJECTIVE BOX
Dr. Navarro  Office [phone]  Cell [phone]  Jane CONNELL  Cell [phone]      Patient is a 65y old  Male who presents with a chief complaint of Missed HD (04 Apr 2020 08:45)      Patient seen and examined at bedside. No chest pain/sob    VITALS:  T(F): 98.1 (04-04-20 @ 13:51), Max: 98.9 (04-04-20 @ 12:05)  HR: 70 (04-04-20 @ 17:50)  BP: 133/62 (04-04-20 @ 17:50)  RR: 17 (04-04-20 @ 13:51)  SpO2: 100% (04-04-20 @ 13:51)  Wt(kg): --    04-03 @ 07:01  -  04-04 @ 07:00  --------------------------------------------------------  IN: 0 mL / OUT: 0 mL / NET: 0 mL    04-04 @ 07:01  -  04-04 @ 17:55  --------------------------------------------------------  IN: 500 mL / OUT: 891 mL / NET: -391 mL      Physical Exam: not done due to isolation    Hospital Medications:   MEDICATIONS  (STANDING):  aspirin enteric coated 81 milliGRAM(s) Oral daily  atorvastatin 40 milliGRAM(s) Oral at bedtime  calcium acetate 1334 milliGRAM(s) Oral three times a day with meals  chlorhexidine 4% Liquid 1 Application(s) Topical daily  dextrose 5%. 1000 milliLiter(s) (50 mL/Hr) IV Continuous <Continuous>  dextrose 50% Injectable 12.5 Gram(s) IV Push once  dextrose 50% Injectable 25 Gram(s) IV Push once  dextrose 50% Injectable 25 Gram(s) IV Push once  furosemide    Tablet 40 milliGRAM(s) Oral two times a day  heparin  Injectable 5000 Unit(s) SubCutaneous three times a day  insulin lispro (HumaLOG) corrective regimen sliding scale   SubCutaneous three times a day before meals  insulin lispro (HumaLOG) corrective regimen sliding scale   SubCutaneous at bedtime  labetalol 100 milliGRAM(s) Oral two times a day  lactobacillus acidophilus 1 Tablet(s) Oral daily  lactulose Syrup 10 Gram(s) Oral at bedtime  multivitamin 1 Tablet(s) Oral daily  NIFEdipine XL 60 milliGRAM(s) Oral at bedtime  senna 2 Tablet(s) Oral at bedtime      LABS:  04-04    132<L>  |  89<L>  |  76<H>  ----------------------------<  58<L>  5.4<H>   |  24  |  7.04<H>    Ca    9.5      04 Apr 2020 05:20  Phos  6.0     04-04  Mg     2.3     04-04      Creatinine Trend: 7.04 <--, 5.57 <--, 6.67 <--, 5.40 <--, 10.27 <--, 9.47 <--    Phosphorus Level, Serum: 6.0 mg/dL (04-04 @ 05:20)                              13.6   3.01  )-----------( 165      ( 04 Apr 2020 05:20 )             43.8     Urine Studies:      .3 (Ca --)      [04-01-20 @ 11:03]   --  HbA1c 4.2      [07-19-19 @ 09:56]    HBsAg NEGATIVE      [04-01-20 @ 11:03]      RADIOLOGY & ADDITIONAL STUDIES:

## 2020-04-04 NOTE — PROGRESS NOTE ADULT - ASSESSMENT
Patient is a 64 y/o M with hx of DM, HTN, right BKA,, left AKA, ESRD on HD M/W/F at [facility] presenting with hyperkalemia 2/2 missed dialysis. Found to be COVID + after spiking a fever.

## 2020-04-04 NOTE — PROGRESS NOTE ADULT - PROBLEM SELECTOR PLAN 2
Febrile to 100.7 overnight. No localizing symptoms.   - will order blood cultures and UA  - will r/o COVID   - continue to monitor, will hold of on antibiotics for now Patient spiked a fever to 100.7 on 4/3  - Found to be COVID 19 +  - Besides for mild non-productive cough, currently asymptomatic.   - CXR clear  - continue to monitor

## 2020-04-04 NOTE — PROGRESS NOTE ADULT - SUBJECTIVE AND OBJECTIVE BOX
Yoon Manriquez M.D.   PGY-3 | Internal Medicine   [phone] | [id number]        Patient is a 65y old  Male who presents with a chief complaint of       SUBJECTIVE / OVERNIGHT EVENTS: Patient found to have be COVID positive after spiking a fever on 4/3. Has remained afebrile, only with mild non-productive cough since. Patient had no acute events overnight.     MEDICATIONS  (STANDING):  aspirin enteric coated 81 milliGRAM(s) Oral daily  atorvastatin 40 milliGRAM(s) Oral at bedtime  calcium acetate 1334 milliGRAM(s) Oral three times a day with meals  chlorhexidine 4% Liquid 1 Application(s) Topical daily  dextrose 5%. 1000 milliLiter(s) (50 mL/Hr) IV Continuous <Continuous>  dextrose 50% Injectable 12.5 Gram(s) IV Push once  dextrose 50% Injectable 25 Gram(s) IV Push once  dextrose 50% Injectable 25 Gram(s) IV Push once  furosemide    Tablet 40 milliGRAM(s) Oral two times a day  heparin  Injectable 5000 Unit(s) SubCutaneous three times a day  insulin lispro (HumaLOG) corrective regimen sliding scale   SubCutaneous three times a day before meals  insulin lispro (HumaLOG) corrective regimen sliding scale   SubCutaneous at bedtime  labetalol 100 milliGRAM(s) Oral two times a day  lactobacillus acidophilus 1 Tablet(s) Oral daily  lactulose Syrup 10 Gram(s) Oral at bedtime  multivitamin 1 Tablet(s) Oral daily  NIFEdipine XL 60 milliGRAM(s) Oral at bedtime  senna 2 Tablet(s) Oral at bedtime    MEDICATIONS  (PRN):  acetaminophen   Tablet .. 650 milliGRAM(s) Oral every 6 hours PRN Temp greater or equal to 38C (100.4F), Moderate Pain (4 - 6)  dextrose 40% Gel 15 Gram(s) Oral once PRN Blood Glucose LESS THAN 70 milliGRAM(s)/deciliter  glucagon  Injectable 1 milliGRAM(s) IntraMuscular once PRN Glucose LESS THAN 70 milligrams/deciliter      Vital Signs Last 24 Hrs  T(C): 36.9 (04 Apr 2020 06:00), Max: 36.9 (04 Apr 2020 06:00)  T(F): 98.4 (04 Apr 2020 06:00), Max: 98.4 (04 Apr 2020 06:00)  HR: 82 (04 Apr 2020 06:00) (60 - 82)  BP: 131/70 (04 Apr 2020 06:00) (113/50 - 131/70)  BP(mean): --  RR: 18 (04 Apr 2020 06:00) (18 - 18)  SpO2: 99% (04 Apr 2020 06:00) (98% - 100%)  CAPILLARY BLOOD GLUCOSE      POCT Blood Glucose.: 89 mg/dL (03 Apr 2020 22:38)  POCT Blood Glucose.: 145 mg/dL (03 Apr 2020 12:38)  POCT Blood Glucose.: 85 mg/dL (03 Apr 2020 09:04)    I&O's Summary    03 Apr 2020 07:01  -  04 Apr 2020 07:00  --------------------------------------------------------  IN: 0 mL / OUT: 0 mL / NET: 0 mL      LABS:                        13.6   3.01  )-----------( 165      ( 04 Apr 2020 05:20 )             43.8     04-03    136  |  95<L>  |  54<H>  ----------------------------<  114<H>  4.4   |  24  |  5.57<H>    Ca    9.7      03 Apr 2020 07:00  Phos  3.9     04-03  Mg     2.1     04-03                    Consultant(s) Notes Reviewed:  Renal Yoon Manriquez M.D.   PGY-3 | Internal Medicine   [phone] | [id number]        Patient is a 65y old  Male who presents with a chief complaint of missed HD      SUBJECTIVE / OVERNIGHT EVENTS: Patient found to have be COVID positive after spiking a fever on 4/3. Has remained afebrile, only with mild non-productive cough since. Patient had no acute events overnight.     MEDICATIONS  (STANDING):  aspirin enteric coated 81 milliGRAM(s) Oral daily  atorvastatin 40 milliGRAM(s) Oral at bedtime  calcium acetate 1334 milliGRAM(s) Oral three times a day with meals  chlorhexidine 4% Liquid 1 Application(s) Topical daily  dextrose 5%. 1000 milliLiter(s) (50 mL/Hr) IV Continuous <Continuous>  dextrose 50% Injectable 12.5 Gram(s) IV Push once  dextrose 50% Injectable 25 Gram(s) IV Push once  dextrose 50% Injectable 25 Gram(s) IV Push once  furosemide    Tablet 40 milliGRAM(s) Oral two times a day  heparin  Injectable 5000 Unit(s) SubCutaneous three times a day  insulin lispro (HumaLOG) corrective regimen sliding scale   SubCutaneous three times a day before meals  insulin lispro (HumaLOG) corrective regimen sliding scale   SubCutaneous at bedtime  labetalol 100 milliGRAM(s) Oral two times a day  lactobacillus acidophilus 1 Tablet(s) Oral daily  lactulose Syrup 10 Gram(s) Oral at bedtime  multivitamin 1 Tablet(s) Oral daily  NIFEdipine XL 60 milliGRAM(s) Oral at bedtime  senna 2 Tablet(s) Oral at bedtime    MEDICATIONS  (PRN):  acetaminophen   Tablet .. 650 milliGRAM(s) Oral every 6 hours PRN Temp greater or equal to 38C (100.4F), Moderate Pain (4 - 6)  dextrose 40% Gel 15 Gram(s) Oral once PRN Blood Glucose LESS THAN 70 milliGRAM(s)/deciliter  glucagon  Injectable 1 milliGRAM(s) IntraMuscular once PRN Glucose LESS THAN 70 milligrams/deciliter      Vital Signs Last 24 Hrs  T(C): 36.9 (04 Apr 2020 06:00), Max: 36.9 (04 Apr 2020 06:00)  T(F): 98.4 (04 Apr 2020 06:00), Max: 98.4 (04 Apr 2020 06:00)  HR: 82 (04 Apr 2020 06:00) (60 - 82)  BP: 131/70 (04 Apr 2020 06:00) (113/50 - 131/70)  BP(mean): --  RR: 18 (04 Apr 2020 06:00) (18 - 18)  SpO2: 99% (04 Apr 2020 06:00) (98% - 100%)  CAPILLARY BLOOD GLUCOSE      POCT Blood Glucose.: 89 mg/dL (03 Apr 2020 22:38)  POCT Blood Glucose.: 145 mg/dL (03 Apr 2020 12:38)  POCT Blood Glucose.: 85 mg/dL (03 Apr 2020 09:04)    I&O's Summary    03 Apr 2020 07:01  -  04 Apr 2020 07:00  --------------------------------------------------------  IN: 0 mL / OUT: 0 mL / NET: 0 mL      LABS:                        13.6   3.01  )-----------( 165      ( 04 Apr 2020 05:20 )             43.8     04-03    136  |  95<L>  |  54<H>  ----------------------------<  114<H>  4.4   |  24  |  5.57<H>    Ca    9.7      03 Apr 2020 07:00  Phos  3.9     04-03  Mg     2.1     04-03                    Consultant(s) Notes Reviewed:  Renal

## 2020-04-04 NOTE — PROGRESS NOTE ADULT - PROBLEM SELECTOR PLAN 1
Pt missed dialysis, and was hyperkalemic to 7.2 on presentation. Pt also fluid overloaded on presentation.   - S/p HD (4/1) and (4/2) with improvement in potassium.   - Plan is to resume M,W, F schedule   - C/w renal diet   - C/w phoslo   - Monitor BMPs Pt missed dialysis, and was hyperkalemic to 7.2 on presentation. Pt also fluid overloaded on presentation.   - S/p HD (4/1) and (4/2) with improvement in potassium.   - Plan is to resume M,W, F schedule when he is discharged. Going to be dialyzed today while inpatient.   - C/w renal diet   - C/w phoslo   - Monitor BMPs

## 2020-04-05 LAB
ANION GAP SERPL CALC-SCNC: 16 MMO/L — HIGH (ref 7–14)
BUN SERPL-MCNC: 55 MG/DL — HIGH (ref 7–23)
CALCIUM SERPL-MCNC: 9.1 MG/DL — SIGNIFICANT CHANGE UP (ref 8.4–10.5)
CHLORIDE SERPL-SCNC: 92 MMOL/L — LOW (ref 98–107)
CO2 SERPL-SCNC: 26 MMOL/L — SIGNIFICANT CHANGE UP (ref 22–31)
CREAT SERPL-MCNC: 5.41 MG/DL — HIGH (ref 0.5–1.3)
GLUCOSE SERPL-MCNC: 51 MG/DL — LOW (ref 70–99)
HCT VFR BLD CALC: 41.9 % — SIGNIFICANT CHANGE UP (ref 39–50)
HGB BLD-MCNC: 12.9 G/DL — LOW (ref 13–17)
MAGNESIUM SERPL-MCNC: 2.2 MG/DL — SIGNIFICANT CHANGE UP (ref 1.6–2.6)
MCHC RBC-ENTMCNC: 28.4 PG — SIGNIFICANT CHANGE UP (ref 27–34)
MCHC RBC-ENTMCNC: 30.8 % — LOW (ref 32–36)
MCV RBC AUTO: 92.1 FL — SIGNIFICANT CHANGE UP (ref 80–100)
NRBC # FLD: 0 K/UL — SIGNIFICANT CHANGE UP (ref 0–0)
PHOSPHATE SERPL-MCNC: 3.9 MG/DL — SIGNIFICANT CHANGE UP (ref 2.5–4.5)
PLATELET # BLD AUTO: 163 K/UL — SIGNIFICANT CHANGE UP (ref 150–400)
PMV BLD: 10.7 FL — SIGNIFICANT CHANGE UP (ref 7–13)
POTASSIUM SERPL-MCNC: 5 MMOL/L — SIGNIFICANT CHANGE UP (ref 3.5–5.3)
POTASSIUM SERPL-SCNC: 5 MMOL/L — SIGNIFICANT CHANGE UP (ref 3.5–5.3)
RBC # BLD: 4.55 M/UL — SIGNIFICANT CHANGE UP (ref 4.2–5.8)
RBC # FLD: 13.8 % — SIGNIFICANT CHANGE UP (ref 10.3–14.5)
SODIUM SERPL-SCNC: 134 MMOL/L — LOW (ref 135–145)
WBC # BLD: 4.27 K/UL — SIGNIFICANT CHANGE UP (ref 3.8–10.5)
WBC # FLD AUTO: 4.27 K/UL — SIGNIFICANT CHANGE UP (ref 3.8–10.5)

## 2020-04-05 RX ADMIN — CHLORHEXIDINE GLUCONATE 1 APPLICATION(S): 213 SOLUTION TOPICAL at 13:01

## 2020-04-05 RX ADMIN — Medication 100 MILLIGRAM(S): at 05:53

## 2020-04-05 RX ADMIN — HEPARIN SODIUM 5000 UNIT(S): 5000 INJECTION INTRAVENOUS; SUBCUTANEOUS at 21:43

## 2020-04-05 RX ADMIN — Medication 60 MILLIGRAM(S): at 21:42

## 2020-04-05 RX ADMIN — Medication 1 TABLET(S): at 13:01

## 2020-04-05 RX ADMIN — Medication 1334 MILLIGRAM(S): at 18:23

## 2020-04-05 RX ADMIN — LACTULOSE 10 GRAM(S): 10 SOLUTION ORAL at 21:42

## 2020-04-05 RX ADMIN — Medication 81 MILLIGRAM(S): at 12:56

## 2020-04-05 RX ADMIN — Medication 1334 MILLIGRAM(S): at 12:56

## 2020-04-05 RX ADMIN — Medication 40 MILLIGRAM(S): at 05:53

## 2020-04-05 RX ADMIN — ATORVASTATIN CALCIUM 40 MILLIGRAM(S): 80 TABLET, FILM COATED ORAL at 21:42

## 2020-04-05 RX ADMIN — HEPARIN SODIUM 5000 UNIT(S): 5000 INJECTION INTRAVENOUS; SUBCUTANEOUS at 12:57

## 2020-04-05 RX ADMIN — SENNA PLUS 2 TABLET(S): 8.6 TABLET ORAL at 21:42

## 2020-04-05 RX ADMIN — HEPARIN SODIUM 5000 UNIT(S): 5000 INJECTION INTRAVENOUS; SUBCUTANEOUS at 05:53

## 2020-04-05 RX ADMIN — Medication 1334 MILLIGRAM(S): at 08:46

## 2020-04-05 RX ADMIN — Medication 40 MILLIGRAM(S): at 18:23

## 2020-04-05 RX ADMIN — Medication 1 TABLET(S): at 12:56

## 2020-04-05 NOTE — PROGRESS NOTE ADULT - PROBLEM SELECTOR PLAN 5
DVT PPX: SQ heparin   Diet: Renal DVT PPX: SQ heparin   Diet: Renal  Dispo: NH when afebrile x72h and has HD reinstated

## 2020-04-05 NOTE — PROGRESS NOTE ADULT - PROBLEM SELECTOR PLAN 2
Patient spiked a fever to 100.7 on 4/3  - Found to be COVID 19 +  - Besides for mild non-productive cough, currently asymptomatic.   - CXR clear  - continue to monitor Patient spiked a fever to 100.7 on 4/3  - Found to be COVID 19 +  - Besides for mild non-productive cough, currently asymptomatic. Remains on room air without respiratory distress.   - CXR clear  - continue to monitor  - Should remain afebrile x72 hours for transfer to Nursing Home.

## 2020-04-05 NOTE — PROGRESS NOTE ADULT - ASSESSMENT
Patient is a 64 y/o M with hx of DM, HTN, right BKA,, left AKA, ESRD on HD M/W/F at [facility] presenting with hyperkalemia 2/2 missed dialysis. Found to be COVID + after spiking a fever.

## 2020-04-05 NOTE — PROGRESS NOTE ADULT - PROBLEM SELECTOR PLAN 1
Pt missed dialysis, and was hyperkalemic to 7.2 on presentation. Pt also fluid overloaded on presentation.   - S/p HD (4/1) and (4/2) with improvement in potassium.   - Plan is to resume M,W, F schedule when he is discharged. Going to be dialyzed today while inpatient.   - C/w renal diet   - C/w phoslo   - Monitor BMPs Pt missed dialysis, and was hyperkalemic to 7.2 on presentation. Pt also fluid overloaded on presentation.   - S/p HD (4/1) and (4/2) with improvement in potassium.   - Plan is to resume M,W,F schedule when he is discharged. Receiving HD inpatient, will reinstate outpatient HD.   - C/w renal diet   - C/w phoslo   - Monitor BMPs  - Outpatient HD will need to accept COVID patients

## 2020-04-05 NOTE — PROGRESS NOTE ADULT - SUBJECTIVE AND OBJECTIVE BOX
Dr. Navarro  Office [phone]  Cell [phone]  Jane CONNELL  Cell [phone]      Patient is a 65y old  Male who presents with a chief complaint of Missed HD (04 Apr 2020 08:45)      No chest pain, has mild sob    VITALS:  T(F): 98.5 (04-05-20 @ 13:32), Max: 99.1 (04-04-20 @ 21:46)  HR: 62 (04-05-20 @ 18:13)  BP: 140/65 (04-05-20 @ 18:13)  RR: 18 (04-05-20 @ 13:32)  SpO2: 100% (04-05-20 @ 13:32)  Wt(kg): --    04-04 @ 07:01  -  04-05 @ 07:00  --------------------------------------------------------  IN: 500 mL / OUT: 891 mL / NET: -391 mL      Hospital Medications:   MEDICATIONS  (STANDING):  aspirin enteric coated 81 milliGRAM(s) Oral daily  atorvastatin 40 milliGRAM(s) Oral at bedtime  calcium acetate 1334 milliGRAM(s) Oral three times a day with meals  chlorhexidine 4% Liquid 1 Application(s) Topical daily  dextrose 5%. 1000 milliLiter(s) (50 mL/Hr) IV Continuous <Continuous>  dextrose 50% Injectable 12.5 Gram(s) IV Push once  dextrose 50% Injectable 25 Gram(s) IV Push once  dextrose 50% Injectable 25 Gram(s) IV Push once  furosemide    Tablet 40 milliGRAM(s) Oral two times a day  heparin  Injectable 5000 Unit(s) SubCutaneous three times a day  insulin lispro (HumaLOG) corrective regimen sliding scale   SubCutaneous three times a day before meals  insulin lispro (HumaLOG) corrective regimen sliding scale   SubCutaneous at bedtime  labetalol 100 milliGRAM(s) Oral two times a day  lactobacillus acidophilus 1 Tablet(s) Oral daily  lactulose Syrup 10 Gram(s) Oral at bedtime  multivitamin 1 Tablet(s) Oral daily  NIFEdipine XL 60 milliGRAM(s) Oral at bedtime  senna 2 Tablet(s) Oral at bedtime      LABS:  04-05    134<L>  |  92<L>  |  55<H>  ----------------------------<  51<L>  5.0   |  26  |  5.41<H>    Ca    9.1      05 Apr 2020 05:20  Phos  3.9     04-05  Mg     2.2     04-05      Creatinine Trend: 5.41 <--, 7.04 <--, 5.57 <--, 6.67 <--, 5.40 <--, 10.27 <--, 9.47 <--    Phosphorus Level, Serum: 3.9 mg/dL (04-05 @ 05:20)                              12.9   4.27  )-----------( 163      ( 05 Apr 2020 05:20 )             41.9     Urine Studies:      .3 (Ca --)      [04-01-20 @ 11:03]   --  HbA1c 4.2      [07-19-19 @ 09:56]    HBsAg NEGATIVE      [04-01-20 @ 11:03]      RADIOLOGY & ADDITIONAL STUDIES:

## 2020-04-05 NOTE — PROGRESS NOTE ADULT - SUBJECTIVE AND OBJECTIVE BOX
***INCOMPLETE NOTE***  Manuel Kaur MD PGY-1  Internal Medicine  Pager [phone] / [phone] ***INCOMPLETE NOTE***  Manuel Kaur MD PGY-1  Internal Medicine  Pager [phone] / [phone]    Patient is a 65y old  Male who presents with a chief complaint of Missed HD (04 Apr 2020 08:45)    SUBJECTIVE / OVERNIGHT EVENTS:  No acute events overnight. Patient with asymptomatic hypoglycemia this AM to 60, improved with PO intake. Complains of minimal shortness of breath and cough. Denies chest pain. Denies any other symptoms.  Denies nausea, vomiting, constipation, diarrhea, fevers, chills, chest pain.    MEDICATIONS  (STANDING):  aspirin enteric coated 81 milliGRAM(s) Oral daily  atorvastatin 40 milliGRAM(s) Oral at bedtime  calcium acetate 1334 milliGRAM(s) Oral three times a day with meals  chlorhexidine 4% Liquid 1 Application(s) Topical daily  dextrose 5%. 1000 milliLiter(s) (50 mL/Hr) IV Continuous <Continuous>  dextrose 50% Injectable 12.5 Gram(s) IV Push once  dextrose 50% Injectable 25 Gram(s) IV Push once  dextrose 50% Injectable 25 Gram(s) IV Push once  furosemide    Tablet 40 milliGRAM(s) Oral two times a day  heparin  Injectable 5000 Unit(s) SubCutaneous three times a day  insulin lispro (HumaLOG) corrective regimen sliding scale   SubCutaneous three times a day before meals  insulin lispro (HumaLOG) corrective regimen sliding scale   SubCutaneous at bedtime  labetalol 100 milliGRAM(s) Oral two times a day  lactobacillus acidophilus 1 Tablet(s) Oral daily  lactulose Syrup 10 Gram(s) Oral at bedtime  multivitamin 1 Tablet(s) Oral daily  NIFEdipine XL 60 milliGRAM(s) Oral at bedtime  senna 2 Tablet(s) Oral at bedtime    MEDICATIONS  (PRN):  acetaminophen   Tablet .. 650 milliGRAM(s) Oral every 6 hours PRN Temp greater or equal to 38C (100.4F), Moderate Pain (4 - 6)  dextrose 40% Gel 15 Gram(s) Oral once PRN Blood Glucose LESS THAN 70 milliGRAM(s)/deciliter  glucagon  Injectable 1 milliGRAM(s) IntraMuscular once PRN Glucose LESS THAN 70 milligrams/deciliter      CAPILLARY BLOOD GLUCOSE      POCT Blood Glucose.: 89 mg/dL (05 Apr 2020 12:14)  POCT Blood Glucose.: 90 mg/dL (05 Apr 2020 09:21)  POCT Blood Glucose.: 78 mg/dL (05 Apr 2020 08:57)  POCT Blood Glucose.: 60 mg/dL (05 Apr 2020 08:36)  POCT Blood Glucose.: 63 mg/dL (05 Apr 2020 08:34)  POCT Blood Glucose.: 95 mg/dL (04 Apr 2020 23:09)  POCT Blood Glucose.: 88 mg/dL (04 Apr 2020 17:34)    I&O's Summary    04 Apr 2020 07:01  -  05 Apr 2020 07:00  --------------------------------------------------------  IN: 500 mL / OUT: 891 mL / NET: -391 mL        PHYSICAL EXAM:  Vital Signs Last 24 Hrs  T(C): 36.9 (04-05-20 @ 13:32)  T(F): 98.5 (04-05-20 @ 13:32), Max: 99.1 (04-04-20 @ 21:46)  HR: 58 (04-05-20 @ 13:32) (58 - 85)  BP: 123/60 (04-05-20 @ 13:32)  BP(mean): --  RR: 18 (04-05-20 @ 13:32) (17 - 18)  SpO2: 100% (04-05-20 @ 13:32) (97% - 100%)  Wt(kg): --    04-04 @ 07:01  -  04-05 @ 07:00  --------------------------------------------------------  IN: 500 mL / OUT: 891 mL / NET: -391 mL      Constitutional: NAD, awake and alert  EYES: EOMI  ENT:  Normal Hearing, no tonsillar exudates   Neck: Soft and supple  Respiratory: Breath sounds are clear bilaterally, No wheezing, rales or rhonchi  Cardiovascular: S1 and S2, regular rate and rhythm, no Murmurs, gallops or rubs  Gastrointestinal: Bowel Sounds present, soft, nontender, nondistended, no guarding, no rebound  Extremities: No cyanosis, clubbing, or edema; warm to touch. R BKA, L AKA.   Vascular: 2+ peripheral pulses lower ex  Neurological: No focal deficits, CN II-XII intact bilaterally, sensation to light touch intact in all extremities. Pupils are symmetrical in size.   Musculoskeletal:  no joint swelling.  Skin: No rashes  Psych: A&Ox3  HEME: no bruises, no nose bleeds      LABS:                        12.9   4.27  )-----------( 163      ( 05 Apr 2020 05:20 )             41.9     04-05    134<L>  |  92<L>  |  55<H>  ----------------------------<  51<L>  5.0   |  26  |  5.41<H>    Ca    9.1      05 Apr 2020 05:20  Phos  3.9     04-05  Mg     2.2     04-05    CAPILLARY BLOOD GLUCOSE  POCT Blood Glucose.: 89 mg/dL (05 Apr 2020 12:14)  POCT Blood Glucose.: 90 mg/dL (05 Apr 2020 09:21)  POCT Blood Glucose.: 78 mg/dL (05 Apr 2020 08:57)  POCT Blood Glucose.: 60 mg/dL (05 Apr 2020 08:36)  POCT Blood Glucose.: 63 mg/dL (05 Apr 2020 08:34)  POCT Blood Glucose.: 95 mg/dL (04 Apr 2020 23:09)  POCT Blood Glucose.: 88 mg/dL (04 Apr 2020 17:34)      RADIOLOGY & ADDITIONAL TESTS:    Imaging Personally Reviewed:    Consultant(s) Notes Reviewed:    Nephrology    Care Discussed with Consultants/Other Providers: Manuel Kaur MD PGY-1  Internal Medicine  Pager [phone] / [phone]    Patient is a 65y old  Male who presents with a chief complaint of Missed HD (04 Apr 2020 08:45)    SUBJECTIVE / OVERNIGHT EVENTS:  No acute events overnight. Patient with asymptomatic hypoglycemia this AM to 60, improved with PO intake. Complains of minimal shortness of breath and cough. Denies chest pain. Denies any other symptoms.  Denies nausea, vomiting, constipation, diarrhea, fevers, chills, chest pain.    MEDICATIONS  (STANDING):  aspirin enteric coated 81 milliGRAM(s) Oral daily  atorvastatin 40 milliGRAM(s) Oral at bedtime  calcium acetate 1334 milliGRAM(s) Oral three times a day with meals  chlorhexidine 4% Liquid 1 Application(s) Topical daily  dextrose 5%. 1000 milliLiter(s) (50 mL/Hr) IV Continuous <Continuous>  dextrose 50% Injectable 12.5 Gram(s) IV Push once  dextrose 50% Injectable 25 Gram(s) IV Push once  dextrose 50% Injectable 25 Gram(s) IV Push once  furosemide    Tablet 40 milliGRAM(s) Oral two times a day  heparin  Injectable 5000 Unit(s) SubCutaneous three times a day  insulin lispro (HumaLOG) corrective regimen sliding scale   SubCutaneous three times a day before meals  insulin lispro (HumaLOG) corrective regimen sliding scale   SubCutaneous at bedtime  labetalol 100 milliGRAM(s) Oral two times a day  lactobacillus acidophilus 1 Tablet(s) Oral daily  lactulose Syrup 10 Gram(s) Oral at bedtime  multivitamin 1 Tablet(s) Oral daily  NIFEdipine XL 60 milliGRAM(s) Oral at bedtime  senna 2 Tablet(s) Oral at bedtime    MEDICATIONS  (PRN):  acetaminophen   Tablet .. 650 milliGRAM(s) Oral every 6 hours PRN Temp greater or equal to 38C (100.4F), Moderate Pain (4 - 6)  dextrose 40% Gel 15 Gram(s) Oral once PRN Blood Glucose LESS THAN 70 milliGRAM(s)/deciliter  glucagon  Injectable 1 milliGRAM(s) IntraMuscular once PRN Glucose LESS THAN 70 milligrams/deciliter      CAPILLARY BLOOD GLUCOSE      POCT Blood Glucose.: 89 mg/dL (05 Apr 2020 12:14)  POCT Blood Glucose.: 90 mg/dL (05 Apr 2020 09:21)  POCT Blood Glucose.: 78 mg/dL (05 Apr 2020 08:57)  POCT Blood Glucose.: 60 mg/dL (05 Apr 2020 08:36)  POCT Blood Glucose.: 63 mg/dL (05 Apr 2020 08:34)  POCT Blood Glucose.: 95 mg/dL (04 Apr 2020 23:09)  POCT Blood Glucose.: 88 mg/dL (04 Apr 2020 17:34)    I&O's Summary    04 Apr 2020 07:01  -  05 Apr 2020 07:00  --------------------------------------------------------  IN: 500 mL / OUT: 891 mL / NET: -391 mL        PHYSICAL EXAM:  Vital Signs Last 24 Hrs  T(C): 36.9 (04-05-20 @ 13:32)  T(F): 98.5 (04-05-20 @ 13:32), Max: 99.1 (04-04-20 @ 21:46)  HR: 58 (04-05-20 @ 13:32) (58 - 85)  BP: 123/60 (04-05-20 @ 13:32)  BP(mean): --  RR: 18 (04-05-20 @ 13:32) (17 - 18)  SpO2: 100% (04-05-20 @ 13:32) (97% - 100%)  Wt(kg): --    04-04 @ 07:01  -  04-05 @ 07:00  --------------------------------------------------------  IN: 500 mL / OUT: 891 mL / NET: -391 mL      Constitutional: NAD, awake and alert  EYES: EOMI  ENT:  Normal Hearing, no tonsillar exudates   Neck: Soft and supple  Respiratory: Breath sounds are clear bilaterally, No wheezing, rales or rhonchi  Cardiovascular: S1 and S2, regular rate and rhythm, no Murmurs, gallops or rubs  Gastrointestinal: Bowel Sounds present, soft, nontender, nondistended, no guarding, no rebound  Extremities: No cyanosis, clubbing, or edema; warm to touch. R BKA, L AKA.   Vascular: 2+ peripheral pulses lower ex  Neurological: No focal deficits, CN II-XII intact bilaterally, sensation to light touch intact in all extremities. Pupils are symmetrical in size.   Musculoskeletal:  no joint swelling.  Skin: No rashes  Psych: A&Ox3  HEME: no bruises, no nose bleeds      LABS:                        12.9   4.27  )-----------( 163      ( 05 Apr 2020 05:20 )             41.9     04-05    134<L>  |  92<L>  |  55<H>  ----------------------------<  51<L>  5.0   |  26  |  5.41<H>    Ca    9.1      05 Apr 2020 05:20  Phos  3.9     04-05  Mg     2.2     04-05    CAPILLARY BLOOD GLUCOSE  POCT Blood Glucose.: 89 mg/dL (05 Apr 2020 12:14)  POCT Blood Glucose.: 90 mg/dL (05 Apr 2020 09:21)  POCT Blood Glucose.: 78 mg/dL (05 Apr 2020 08:57)  POCT Blood Glucose.: 60 mg/dL (05 Apr 2020 08:36)  POCT Blood Glucose.: 63 mg/dL (05 Apr 2020 08:34)  POCT Blood Glucose.: 95 mg/dL (04 Apr 2020 23:09)  POCT Blood Glucose.: 88 mg/dL (04 Apr 2020 17:34)      RADIOLOGY & ADDITIONAL TESTS:    Imaging Personally Reviewed:    Consultant(s) Notes Reviewed:    Nephrology    Care Discussed with Consultants/Other Providers:

## 2020-04-05 NOTE — PROGRESS NOTE ADULT - ASSESSMENT
64 y/o M with hx of DM, HTN, right BKA,, left AKA, ESRD on HD TTS via AVF at [facility] presenting for missed dialysis    ESRD on HD via AVF TTS  will continue TTS schedule   consent in chart  renal diet  monitor electrolyte  Has to be planed for discharge in a COVID positive HD unit post HD. His HD unit ([facility]) will make the arrangement but needs to be cleared before discharge    Hyperkalemia  sec to missed hd  better  IHD per schedule   monitor    Anemia  at goal  no need for epo  monitor hb    HTN  Controlled today   uf with hd  monitor    Ckd-mbd  pth, phos are at goal   monitor calcium and phos daily    FEver  COVID positive

## 2020-04-06 DIAGNOSIS — R22.2 LOCALIZED SWELLING, MASS AND LUMP, TRUNK: ICD-10-CM

## 2020-04-06 LAB
ANION GAP SERPL CALC-SCNC: 21 MMO/L — HIGH (ref 7–14)
BUN SERPL-MCNC: 76 MG/DL — HIGH (ref 7–23)
CALCIUM SERPL-MCNC: 9 MG/DL — SIGNIFICANT CHANGE UP (ref 8.4–10.5)
CHLORIDE SERPL-SCNC: 95 MMOL/L — LOW (ref 98–107)
CO2 SERPL-SCNC: 20 MMOL/L — LOW (ref 22–31)
CREAT SERPL-MCNC: 7.25 MG/DL — HIGH (ref 0.5–1.3)
GLUCOSE SERPL-MCNC: 47 MG/DL — LOW (ref 70–99)
HCT VFR BLD CALC: 38.9 % — LOW (ref 39–50)
HGB BLD-MCNC: 12.5 G/DL — LOW (ref 13–17)
MAGNESIUM SERPL-MCNC: 2.2 MG/DL — SIGNIFICANT CHANGE UP (ref 1.6–2.6)
MCHC RBC-ENTMCNC: 28.9 PG — SIGNIFICANT CHANGE UP (ref 27–34)
MCHC RBC-ENTMCNC: 32.1 % — SIGNIFICANT CHANGE UP (ref 32–36)
MCV RBC AUTO: 90 FL — SIGNIFICANT CHANGE UP (ref 80–100)
NRBC # FLD: 0 K/UL — SIGNIFICANT CHANGE UP (ref 0–0)
PHOSPHATE SERPL-MCNC: 4.6 MG/DL — HIGH (ref 2.5–4.5)
PLATELET # BLD AUTO: 165 K/UL — SIGNIFICANT CHANGE UP (ref 150–400)
PMV BLD: 11.5 FL — SIGNIFICANT CHANGE UP (ref 7–13)
POTASSIUM SERPL-MCNC: 5.5 MMOL/L — HIGH (ref 3.5–5.3)
POTASSIUM SERPL-SCNC: 5.5 MMOL/L — HIGH (ref 3.5–5.3)
RBC # BLD: 4.32 M/UL — SIGNIFICANT CHANGE UP (ref 4.2–5.8)
RBC # FLD: 13.6 % — SIGNIFICANT CHANGE UP (ref 10.3–14.5)
SODIUM SERPL-SCNC: 136 MMOL/L — SIGNIFICANT CHANGE UP (ref 135–145)
WBC # BLD: 4.02 K/UL — SIGNIFICANT CHANGE UP (ref 3.8–10.5)
WBC # FLD AUTO: 4.02 K/UL — SIGNIFICANT CHANGE UP (ref 3.8–10.5)

## 2020-04-06 RX ORDER — SODIUM ZIRCONIUM CYCLOSILICATE 10 G/10G
10 POWDER, FOR SUSPENSION ORAL ONCE
Refills: 0 | Status: COMPLETED | OUTPATIENT
Start: 2020-04-06 | End: 2020-04-06

## 2020-04-06 RX ORDER — DEXTROSE 50 % IN WATER 50 %
25 SYRINGE (ML) INTRAVENOUS
Refills: 0 | Status: DISCONTINUED | OUTPATIENT
Start: 2020-04-06 | End: 2020-04-06

## 2020-04-06 RX ORDER — DEXTROSE 50 % IN WATER 50 %
25 SYRINGE (ML) INTRAVENOUS
Refills: 0 | Status: DISCONTINUED | OUTPATIENT
Start: 2020-04-06 | End: 2020-04-08

## 2020-04-06 RX ADMIN — Medication 81 MILLIGRAM(S): at 13:59

## 2020-04-06 RX ADMIN — HEPARIN SODIUM 5000 UNIT(S): 5000 INJECTION INTRAVENOUS; SUBCUTANEOUS at 14:03

## 2020-04-06 RX ADMIN — Medication 40 MILLIGRAM(S): at 06:18

## 2020-04-06 RX ADMIN — Medication 1334 MILLIGRAM(S): at 18:41

## 2020-04-06 RX ADMIN — Medication 1334 MILLIGRAM(S): at 09:07

## 2020-04-06 RX ADMIN — ATORVASTATIN CALCIUM 40 MILLIGRAM(S): 80 TABLET, FILM COATED ORAL at 22:59

## 2020-04-06 RX ADMIN — HEPARIN SODIUM 5000 UNIT(S): 5000 INJECTION INTRAVENOUS; SUBCUTANEOUS at 23:00

## 2020-04-06 RX ADMIN — Medication 1 TABLET(S): at 14:03

## 2020-04-06 RX ADMIN — LACTULOSE 10 GRAM(S): 10 SOLUTION ORAL at 23:00

## 2020-04-06 RX ADMIN — SENNA PLUS 2 TABLET(S): 8.6 TABLET ORAL at 23:00

## 2020-04-06 RX ADMIN — Medication 25 MILLILITER(S): at 12:21

## 2020-04-06 RX ADMIN — Medication 1334 MILLIGRAM(S): at 13:58

## 2020-04-06 RX ADMIN — Medication 40 MILLIGRAM(S): at 18:41

## 2020-04-06 RX ADMIN — Medication 100 MILLIGRAM(S): at 06:18

## 2020-04-06 RX ADMIN — CHLORHEXIDINE GLUCONATE 1 APPLICATION(S): 213 SOLUTION TOPICAL at 13:58

## 2020-04-06 RX ADMIN — Medication 1 TABLET(S): at 13:59

## 2020-04-06 RX ADMIN — SODIUM ZIRCONIUM CYCLOSILICATE 10 GRAM(S): 10 POWDER, FOR SUSPENSION ORAL at 19:44

## 2020-04-06 RX ADMIN — HEPARIN SODIUM 5000 UNIT(S): 5000 INJECTION INTRAVENOUS; SUBCUTANEOUS at 06:18

## 2020-04-06 RX ADMIN — Medication 60 MILLIGRAM(S): at 23:00

## 2020-04-06 RX ADMIN — Medication 25 MILLILITER(S): at 19:45

## 2020-04-06 NOTE — PROGRESS NOTE ADULT - PROBLEM SELECTOR PLAN 5
DVT PPX: SQ heparin   Diet: Renal  Dispo: NH when afebrile x72h and has HD reinstated onset x1 month prior without recent growth. Not draining, not erythematous, likely not acutely infectious  - rec outpatient follow-up

## 2020-04-06 NOTE — PROGRESS NOTE ADULT - SUBJECTIVE AND OBJECTIVE BOX
PROGRESS NOTE:     CONTACT INFO:  EDGAR Alvarado MD  Pager: [phone] [place]/ [id number] [facility]    Patient is a 65y old  Male who presents with a chief complaint of Missed HD (04 Apr 2020 08:45)      SUBJECTIVE / OVERNIGHT EVENTS:  No acute events overnight. Patient seen and evaluated at bedside. No fever/chills.  Denies SOB at rest, chest pain, palpitations, abdominal pain, nausea/vomiting    ADDITIONAL REVIEW OF SYSTEMS:    MEDICATIONS  (STANDING):  aspirin enteric coated 81 milliGRAM(s) Oral daily  atorvastatin 40 milliGRAM(s) Oral at bedtime  calcium acetate 1334 milliGRAM(s) Oral three times a day with meals  chlorhexidine 4% Liquid 1 Application(s) Topical daily  dextrose 5%. 1000 milliLiter(s) (50 mL/Hr) IV Continuous <Continuous>  dextrose 50% Injectable 12.5 Gram(s) IV Push once  dextrose 50% Injectable 25 Gram(s) IV Push once  dextrose 50% Injectable 25 Gram(s) IV Push once  furosemide    Tablet 40 milliGRAM(s) Oral two times a day  heparin  Injectable 5000 Unit(s) SubCutaneous three times a day  insulin lispro (HumaLOG) corrective regimen sliding scale   SubCutaneous three times a day before meals  insulin lispro (HumaLOG) corrective regimen sliding scale   SubCutaneous at bedtime  labetalol 100 milliGRAM(s) Oral two times a day  lactobacillus acidophilus 1 Tablet(s) Oral daily  lactulose Syrup 10 Gram(s) Oral at bedtime  multivitamin 1 Tablet(s) Oral daily  NIFEdipine XL 60 milliGRAM(s) Oral at bedtime  senna 2 Tablet(s) Oral at bedtime    MEDICATIONS  (PRN):  acetaminophen   Tablet .. 650 milliGRAM(s) Oral every 6 hours PRN Temp greater or equal to 38C (100.4F), Moderate Pain (4 - 6)  dextrose 40% Gel 15 Gram(s) Oral once PRN Blood Glucose LESS THAN 70 milliGRAM(s)/deciliter  glucagon  Injectable 1 milliGRAM(s) IntraMuscular once PRN Glucose LESS THAN 70 milligrams/deciliter      CAPILLARY BLOOD GLUCOSE      POCT Blood Glucose.: 88 mg/dL (05 Apr 2020 21:36)  POCT Blood Glucose.: 114 mg/dL (05 Apr 2020 19:28)  POCT Blood Glucose.: 109 mg/dL (05 Apr 2020 19:03)  POCT Blood Glucose.: 90 mg/dL (05 Apr 2020 18:44)  POCT Blood Glucose.: 59 mg/dL (05 Apr 2020 18:10)  POCT Blood Glucose.: 61 mg/dL (05 Apr 2020 18:07)  POCT Blood Glucose.: 89 mg/dL (05 Apr 2020 12:14)  POCT Blood Glucose.: 90 mg/dL (05 Apr 2020 09:21)  POCT Blood Glucose.: 78 mg/dL (05 Apr 2020 08:57)  POCT Blood Glucose.: 60 mg/dL (05 Apr 2020 08:36)  POCT Blood Glucose.: 63 mg/dL (05 Apr 2020 08:34)    I&O's Summary      PHYSICAL EXAM:  Vital Signs Last 24 Hrs  T(C): 37.1 (06 Apr 2020 06:16), Max: 37.1 (06 Apr 2020 06:16)  T(F): 98.8 (06 Apr 2020 06:16), Max: 98.8 (06 Apr 2020 06:16)  HR: 64 (06 Apr 2020 06:16) (58 - 64)  BP: 147/58 (06 Apr 2020 06:16) (123/60 - 147/58)  BP(mean): --  RR: 18 (06 Apr 2020 06:16) (18 - 18)  SpO2: 99% (06 Apr 2020 06:16) (97% - 100%)    CONSTITUTIONAL: NAD, well-developed  RESPIRATORY: Normal respiratory effort; lungs are clear to auscultation bilaterally  CARDIOVASCULAR: Regular rate and rhythm, normal S1 and S2, no murmur/rub/gallop; No lower extremity edema; Peripheral pulses are 2+ bilaterally  ABDOMEN: Nontender to palpation, normoactive bowel sounds, no rebound/guarding; No hepatosplenomegaly  MUSCLOSKELETAL: no clubbing or cyanosis of digits; no joint swelling or tenderness to palpation  PSYCH: A+O to person, place, and time; affect appropriate    LABS:                        12.9   4.27  )-----------( 163      ( 05 Apr 2020 05:20 )             41.9     04-05    134<L>  |  92<L>  |  55<H>  ----------------------------<  51<L>  5.0   |  26  |  5.41<H>    Ca    9.1      05 Apr 2020 05:20  Phos  3.9     04-05  Mg     2.2     04-05                Culture - Blood (collected 03 Apr 2020 12:19)  Source: .Blood Blood-Venous  Preliminary Report (04 Apr 2020 13:01):    No growth to date.        RADIOLOGY & ADDITIONAL TESTS:  Results Reviewed:   Imaging Personally Reviewed:  Electrocardiogram Personally Reviewed:    COORDINATION OF CARE:  Care Discussed with Consultants/Other Providers [Y/N]:  Prior or Outpatient Records Reviewed [Y/N]: PROGRESS NOTE:     CONTACT INFO:  EDGAR Alvarado MD  Pager: [phone] [place]/ [id number] [facility]    Patient is a 65y old  Male who presents with a chief complaint of Missed HD (04 Apr 2020 08:45)      SUBJECTIVE / OVERNIGHT EVENTS:    - No acute events overnight. Patient seen and evaluated at bedside. No fever/chills.    - Patient lying in bed, appears comfortable. Has complaint of chronic 1.5inch nodule on back; denies pruritis, denies pain at rest.  - Denies SOB at rest, chest pain, palpitations, abdominal pain, nausea/vomiting    ADDITIONAL REVIEW OF SYSTEMS:    MEDICATIONS  (STANDING):  aspirin enteric coated 81 milliGRAM(s) Oral daily  atorvastatin 40 milliGRAM(s) Oral at bedtime  calcium acetate 1334 milliGRAM(s) Oral three times a day with meals  chlorhexidine 4% Liquid 1 Application(s) Topical daily  dextrose 5%. 1000 milliLiter(s) (50 mL/Hr) IV Continuous <Continuous>  dextrose 50% Injectable 12.5 Gram(s) IV Push once  dextrose 50% Injectable 25 Gram(s) IV Push once  dextrose 50% Injectable 25 Gram(s) IV Push once  furosemide    Tablet 40 milliGRAM(s) Oral two times a day  heparin  Injectable 5000 Unit(s) SubCutaneous three times a day  insulin lispro (HumaLOG) corrective regimen sliding scale   SubCutaneous three times a day before meals  insulin lispro (HumaLOG) corrective regimen sliding scale   SubCutaneous at bedtime  labetalol 100 milliGRAM(s) Oral two times a day  lactobacillus acidophilus 1 Tablet(s) Oral daily  lactulose Syrup 10 Gram(s) Oral at bedtime  multivitamin 1 Tablet(s) Oral daily  NIFEdipine XL 60 milliGRAM(s) Oral at bedtime  senna 2 Tablet(s) Oral at bedtime    MEDICATIONS  (PRN):  acetaminophen   Tablet .. 650 milliGRAM(s) Oral every 6 hours PRN Temp greater or equal to 38C (100.4F), Moderate Pain (4 - 6)  dextrose 40% Gel 15 Gram(s) Oral once PRN Blood Glucose LESS THAN 70 milliGRAM(s)/deciliter  glucagon  Injectable 1 milliGRAM(s) IntraMuscular once PRN Glucose LESS THAN 70 milligrams/deciliter      CAPILLARY BLOOD GLUCOSE      POCT Blood Glucose.: 88 mg/dL (05 Apr 2020 21:36)  POCT Blood Glucose.: 114 mg/dL (05 Apr 2020 19:28)  POCT Blood Glucose.: 109 mg/dL (05 Apr 2020 19:03)  POCT Blood Glucose.: 90 mg/dL (05 Apr 2020 18:44)  POCT Blood Glucose.: 59 mg/dL (05 Apr 2020 18:10)  POCT Blood Glucose.: 61 mg/dL (05 Apr 2020 18:07)  POCT Blood Glucose.: 89 mg/dL (05 Apr 2020 12:14)  POCT Blood Glucose.: 90 mg/dL (05 Apr 2020 09:21)  POCT Blood Glucose.: 78 mg/dL (05 Apr 2020 08:57)  POCT Blood Glucose.: 60 mg/dL (05 Apr 2020 08:36)  POCT Blood Glucose.: 63 mg/dL (05 Apr 2020 08:34)    I&O's Summary      PHYSICAL EXAM:  Vital Signs Last 24 Hrs  T(C): 37.1 (06 Apr 2020 06:16), Max: 37.1 (06 Apr 2020 06:16)  T(F): 98.8 (06 Apr 2020 06:16), Max: 98.8 (06 Apr 2020 06:16)  HR: 64 (06 Apr 2020 06:16) (58 - 64)  BP: 147/58 (06 Apr 2020 06:16) (123/60 - 147/58)  BP(mean): --  RR: 18 (06 Apr 2020 06:16) (18 - 18)  SpO2: 99% (06 Apr 2020 06:16) (97% - 100%)    CONSTITUTIONAL: NAD, well-developed  RESPIRATORY: Normal respiratory effort; lungs are clear to auscultation bilaterally  CARDIOVASCULAR: Regular rate and rhythm, normal S1 and S2, no murmur/rub/gallop; No lower extremity edema; Peripheral pulses are 2+ bilaterally  ABDOMEN: Nontender to palpation, normoactive bowel sounds, no rebound/guarding; No hepatosplenomegaly  MUSCLOSKELETAL: no clubbing or cyanosis of digits; no joint swelling or tenderness to palpation  PSYCH: A+O to person, place, and time; affect appropriate  SKIN: 1.5inch nodule on upper back, nonerythematous, slightly tender to palpation, no discoloration    LABS:             CBC Full  -  ( 06 Apr 2020 05:10 )  WBC Count : 4.02 K/uL  RBC Count : 4.32 M/uL  Hemoglobin : 12.5 g/dL  Hematocrit : 38.9 %  Platelet Count - Automated : 165 K/uL  Mean Cell Volume : 90.0 fL  Mean Cell Hemoglobin : 28.9 pg  Mean Cell Hemoglobin Concentration : 32.1 %  Auto Neutrophil # : x  Auto Lymphocyte # : x  Auto Monocyte # : x  Auto Eosinophil # : x  Auto Basophil # : x  Auto Neutrophil % : x  Auto Lymphocyte % : x  Auto Monocyte % : x  Auto Eosinophil % : x  Auto Basophil % : x    04-06    136  |  95<L>  |  76<H>  ----------------------------<  47<L>  5.5<H>   |  20<L>  |  7.25<H>    Ca    9.0      06 Apr 2020 05:10  Phos  4.6     04-06  Mg     2.2     04-06 PROGRESS NOTE:     CONTACT INFO:  EDGAR Alvarado MD  Pager: [phone] [place]/ [id number] [facility]    Patient is a 65y old  Male who presents with a chief complaint of Missed HD (04 Apr 2020 08:45)      SUBJECTIVE / OVERNIGHT EVENTS:    - No acute events overnight. Patient seen and evaluated at bedside. No fever/chills.    - Patient lying in bed, appears comfortable. Has complaint of chronic 1.5inch nodule on back; denies pruritis, denies pain at rest.  - Denies SOB at rest, chest pain, palpitations, abdominal pain, nausea/vomiting    ADDITIONAL REVIEW OF SYSTEMS:    MEDICATIONS  (STANDING):  aspirin enteric coated 81 milliGRAM(s) Oral daily  atorvastatin 40 milliGRAM(s) Oral at bedtime  calcium acetate 1334 milliGRAM(s) Oral three times a day with meals  chlorhexidine 4% Liquid 1 Application(s) Topical daily  dextrose 5%. 1000 milliLiter(s) (50 mL/Hr) IV Continuous <Continuous>  dextrose 50% Injectable 12.5 Gram(s) IV Push once  dextrose 50% Injectable 25 Gram(s) IV Push once  dextrose 50% Injectable 25 Gram(s) IV Push once  furosemide    Tablet 40 milliGRAM(s) Oral two times a day  heparin  Injectable 5000 Unit(s) SubCutaneous three times a day  insulin lispro (HumaLOG) corrective regimen sliding scale   SubCutaneous three times a day before meals  insulin lispro (HumaLOG) corrective regimen sliding scale   SubCutaneous at bedtime  labetalol 100 milliGRAM(s) Oral two times a day  lactobacillus acidophilus 1 Tablet(s) Oral daily  lactulose Syrup 10 Gram(s) Oral at bedtime  multivitamin 1 Tablet(s) Oral daily  NIFEdipine XL 60 milliGRAM(s) Oral at bedtime  senna 2 Tablet(s) Oral at bedtime    MEDICATIONS  (PRN):  acetaminophen   Tablet .. 650 milliGRAM(s) Oral every 6 hours PRN Temp greater or equal to 38C (100.4F), Moderate Pain (4 - 6)  dextrose 40% Gel 15 Gram(s) Oral once PRN Blood Glucose LESS THAN 70 milliGRAM(s)/deciliter  glucagon  Injectable 1 milliGRAM(s) IntraMuscular once PRN Glucose LESS THAN 70 milligrams/deciliter      CAPILLARY BLOOD GLUCOSE      POCT Blood Glucose.: 88 mg/dL (05 Apr 2020 21:36)  POCT Blood Glucose.: 114 mg/dL (05 Apr 2020 19:28)  POCT Blood Glucose.: 109 mg/dL (05 Apr 2020 19:03)  POCT Blood Glucose.: 90 mg/dL (05 Apr 2020 18:44)  POCT Blood Glucose.: 59 mg/dL (05 Apr 2020 18:10)  POCT Blood Glucose.: 61 mg/dL (05 Apr 2020 18:07)  POCT Blood Glucose.: 89 mg/dL (05 Apr 2020 12:14)  POCT Blood Glucose.: 90 mg/dL (05 Apr 2020 09:21)  POCT Blood Glucose.: 78 mg/dL (05 Apr 2020 08:57)  POCT Blood Glucose.: 60 mg/dL (05 Apr 2020 08:36)  POCT Blood Glucose.: 63 mg/dL (05 Apr 2020 08:34)    I&O's Summary      PHYSICAL EXAM:  Vital Signs Last 24 Hrs  T(C): 37.1 (06 Apr 2020 06:16), Max: 37.1 (06 Apr 2020 06:16)  T(F): 98.8 (06 Apr 2020 06:16), Max: 98.8 (06 Apr 2020 06:16)  HR: 64 (06 Apr 2020 06:16) (58 - 64)  BP: 147/58 (06 Apr 2020 06:16) (123/60 - 147/58)  BP(mean): --  RR: 18 (06 Apr 2020 06:16) (18 - 18)  SpO2: 99% (06 Apr 2020 06:16) (97% - 100%)    Constitutional: NAD, awake and alert  EYES: EOMI  ENT:  Normal Hearing, no tonsillar exudates   Neck: Soft and supple  Respiratory: Breath sounds are clear bilaterally, No wheezing, rales or rhonchi  Cardiovascular: S1 and S2, regular rate and rhythm, no Murmurs, gallops or rubs  Gastrointestinal: Bowel Sounds present, soft, nontender, nondistended, no guarding  Neurological: No focal deficits, CN II-XII intact bilaterally, sensation to light touch intact in all extremities.  Skin: 1.5inch nodule on upper back, nonerythematous, slightly tender to palpation, no discoloration, not draining    LABS:             CBC Full  -  ( 06 Apr 2020 05:10 )  WBC Count : 4.02 K/uL  RBC Count : 4.32 M/uL  Hemoglobin : 12.5 g/dL  Hematocrit : 38.9 %  Platelet Count - Automated : 165 K/uL  Mean Cell Volume : 90.0 fL  Mean Cell Hemoglobin : 28.9 pg  Mean Cell Hemoglobin Concentration : 32.1 %  Auto Neutrophil # : x  Auto Lymphocyte # : x  Auto Monocyte # : x  Auto Eosinophil # : x  Auto Basophil # : x  Auto Neutrophil % : x  Auto Lymphocyte % : x  Auto Monocyte % : x  Auto Eosinophil % : x  Auto Basophil % : x    04-06    136  |  95<L>  |  76<H>  ----------------------------<  47<L>  5.5<H>   |  20<L>  |  7.25<H>    Ca    9.0      06 Apr 2020 05:10  Phos  4.6     04-06  Mg     2.2     04-06

## 2020-04-06 NOTE — PROGRESS NOTE ADULT - ASSESSMENT
66 y/o M with hx of DM, HTN, right BKA,, left AKA, ESRD on HD TTS via AVF at [facility] presenting for missed dialysis    ESRD on HD via AVF TTS  will continue TTS schedule   Plan for IHD tmrw   consent in chart  renal diet  monitor electrolyte  Has to be planed for discharge in a COVID positive HD unit post HD. His HD unit ([place]) will make the arrangement but needs to be medically cleared before discharge    Hyperkalemia  sec to missed hd  Plan for HD tmrw  Lokelma x 1 today   IHD per schedule   monitor    Anemia  at goal  no need for epo  monitor hb    HTN  Controlled today   uf with hd  monitor    Ckd-mbd  pth, phos are at goal   monitor calcium and phos daily    FEver  COVID positive

## 2020-04-06 NOTE — PROGRESS NOTE ADULT - PROBLEM SELECTOR PLAN 1
Pt missed dialysis, and was hyperkalemic to 7.2 on presentation. Pt also fluid overloaded on presentation.   - S/p HD (4/1) and (4/2) with improvement in potassium.   - Plan is to resume M,W,F schedule when he is discharged. Receiving HD inpatient, will reinstate outpatient HD.   - C/w renal diet   - C/w phoslo   - Monitor BMPs  - Outpatient HD will need to accept COVID patients Pt missed dialysis, and was hyperkalemic to 7.2 on presentation. Pt also fluid overloaded on presentation. S/p HD (4/1) and (4/2) with improvement in potassium. Most recent 4/6 K = 5.5  - Plan for IHD tomorrow morning with potential discharge afterwards.  - Start lokelma x1  - C/w renal diet   - C/w phoslo   - Monitor BMPs, Ca, Phos  - renal recs appreciated  - Plan to resume M,W,F schedule when he is discharged. Receiving HD inpatient, will reinstate outpatient HD.   - SW to coordinate outpatient HD (will need to accept COVID patients)

## 2020-04-06 NOTE — PROGRESS NOTE ADULT - SUBJECTIVE AND OBJECTIVE BOX
[facility]   MD Nathaniel Kendall MD, D.O. Ruoru Wong, PA    From 7 AM - 5 PM:  OFFICE: [phone]  Dr. Navarro cell: [phone]  Dr. Karimi cell: [phone]  Dr. Garza cell: [phone]  BECKI Sam cell: [phone]    From 5 PM - 7 AM: Answering Service: [phone]      RENAL FOLLOW UP NOTE  --------------------------------------------------------------------------------  HPI: Pt covid +     PAST HISTORY  --------------------------------------------------------------------------------  No significant changes to PMH, PSH, FHx, SHx, unless otherwise noted    ALLERGIES & MEDICATIONS  --------------------------------------------------------------------------------  Allergies    No Known Allergies    Intolerances      Standing Inpatient Medications  aspirin enteric coated 81 milliGRAM(s) Oral daily  atorvastatin 40 milliGRAM(s) Oral at bedtime  calcium acetate 1334 milliGRAM(s) Oral three times a day with meals  chlorhexidine 4% Liquid 1 Application(s) Topical daily  dextrose 5%. 1000 milliLiter(s) IV Continuous <Continuous>  dextrose 50% Injectable 25 milliLiter(s) IV Push <User Schedule>  dextrose 50% Injectable 12.5 Gram(s) IV Push once  dextrose 50% Injectable 25 Gram(s) IV Push once  dextrose 50% Injectable 25 Gram(s) IV Push once  furosemide    Tablet 40 milliGRAM(s) Oral two times a day  heparin  Injectable 5000 Unit(s) SubCutaneous three times a day  insulin lispro (HumaLOG) corrective regimen sliding scale   SubCutaneous three times a day before meals  insulin lispro (HumaLOG) corrective regimen sliding scale   SubCutaneous at bedtime  labetalol 100 milliGRAM(s) Oral two times a day  lactobacillus acidophilus 1 Tablet(s) Oral daily  lactulose Syrup 10 Gram(s) Oral at bedtime  multivitamin 1 Tablet(s) Oral daily  NIFEdipine XL 60 milliGRAM(s) Oral at bedtime  senna 2 Tablet(s) Oral at bedtime    PRN Inpatient Medications  acetaminophen   Tablet .. 650 milliGRAM(s) Oral every 6 hours PRN  dextrose 40% Gel 15 Gram(s) Oral once PRN  glucagon  Injectable 1 milliGRAM(s) IntraMuscular once PRN      REVIEW OF SYSTEMS  --------------------------------------------------------------------------------  Unable to obtain     VITALS/PHYSICAL EXAM  --------------------------------------------------------------------------------  T(C): 37.1 (04-06-20 @ 13:32), Max: 37.1 (04-06-20 @ 06:16)  HR: 60 (04-06-20 @ 13:32) (60 - 64)  BP: 129/60 (04-06-20 @ 13:32) (129/60 - 147/58)  RR: 18 (04-06-20 @ 13:32) (18 - 18)  SpO2: 96% (04-06-20 @ 13:32) (96% - 99%)  Wt(kg): --      LABS/STUDIES  --------------------------------------------------------------------------------              12.5   4.02  >-----------<  165      [04-06-20 @ 05:10]              38.9     136  |  95  |  76  ----------------------------<  47      [04-06-20 @ 05:10]  5.5   |  20  |  7.25        Ca     9.0     [04-06-20 @ 05:10]      Mg     2.2     [04-06-20 @ 05:10]      Phos  4.6     [04-06-20 @ 05:10]    Creatinine Trend:  SCr 7.25 [04-06 @ 05:10]  SCr 5.41 [04-05 @ 05:20]  SCr 7.04 [04-04 @ 05:20]  SCr 5.57 [04-03 @ 07:00]  SCr 6.67 [04-02 @ 11:10]      .3 (Ca --)      [04-01-20 @ 11:03]   --  HbA1c 4.2      [07-19-19 @ 09:56]    HBsAg NEGATIVE      [04-01-20 @ 11:03]

## 2020-04-06 NOTE — PROGRESS NOTE ADULT - PROBLEM SELECTOR PLAN 2
Patient spiked a fever to 100.7 on 4/3  - Found to be COVID 19 +  - Besides for mild non-productive cough, currently asymptomatic. Remains on room air without respiratory distress.   - CXR clear  - continue to monitor  - Should remain afebrile x72 hours for transfer to Nursing Home. Patient spiked a fever to 100.7 on 4/3  - Found to be COVID 19 +  - Besides for mild non-productive cough, currently asymptomatic. Remains on room air without respiratory distress.   - CXR clear  - Has been afebrile x72 hours, ready for transfer to Nursing Home.

## 2020-04-06 NOTE — PROGRESS NOTE ADULT - PROBLEM SELECTOR PLAN 6
Transitions of Care Status:  1.  Name of PCP: Dr. Arian Colon   2.  PCP Contacted on Admission: [ ] Y    [ ] N    3.  PCP contacted at Discharge: [ ] Y    [ ] N    [ ] N/A  4.  Post-Discharge Appointment Date and Location:  5.  Summary of Handoff given to PCP: DVT PPX: SQ heparin   Diet: Renal  Dispo: NH when afebrile x72h and has HD reinstated

## 2020-04-06 NOTE — PROGRESS NOTE ADULT - ASSESSMENT
Patient is a 64 y/o M with hx of DM, HTN, right BKA,, left AKA, ESRD on HD M/W/F at [facility] presenting with hyperkalemia 2/2 missed dialysis. Found to be COVID + after spiking a fever.

## 2020-04-06 NOTE — PROGRESS NOTE ADULT - PROBLEM SELECTOR PLAN 4
C/w ISS, FSG  - Has been hypoglycemic, will monitor closely. C/w ISS, FSG  - Has been hypoglycemic, will monitor closely.  - 25ml D50% q8h

## 2020-04-06 NOTE — PROGRESS NOTE ADULT - ATTENDING COMMENTS
Management MANE H Staff  DC planning as cleared by Renal
Management  H Staff as above
No new symptoms  DC planning for rehab
Awaiting HD as out patient

## 2020-04-07 LAB
ALBUMIN SERPL ELPH-MCNC: 3.6 G/DL — SIGNIFICANT CHANGE UP (ref 3.3–5)
ALP SERPL-CCNC: 102 U/L — SIGNIFICANT CHANGE UP (ref 40–120)
ALT FLD-CCNC: 45 U/L — HIGH (ref 4–41)
ANION GAP SERPL CALC-SCNC: 18 MMO/L — HIGH (ref 7–14)
AST SERPL-CCNC: 36 U/L — SIGNIFICANT CHANGE UP (ref 4–40)
BILIRUB SERPL-MCNC: 0.3 MG/DL — SIGNIFICANT CHANGE UP (ref 0.2–1.2)
BUN SERPL-MCNC: 90 MG/DL — HIGH (ref 7–23)
CALCIUM SERPL-MCNC: 8.5 MG/DL — SIGNIFICANT CHANGE UP (ref 8.4–10.5)
CHLORIDE SERPL-SCNC: 90 MMOL/L — LOW (ref 98–107)
CO2 SERPL-SCNC: 23 MMOL/L — SIGNIFICANT CHANGE UP (ref 22–31)
CREAT SERPL-MCNC: 7.75 MG/DL — HIGH (ref 0.5–1.3)
CULTURE RESULTS: SIGNIFICANT CHANGE UP
GLUCOSE SERPL-MCNC: 78 MG/DL — SIGNIFICANT CHANGE UP (ref 70–99)
HCT VFR BLD CALC: 39.2 % — SIGNIFICANT CHANGE UP (ref 39–50)
HGB BLD-MCNC: 12.8 G/DL — LOW (ref 13–17)
MAGNESIUM SERPL-MCNC: 2.1 MG/DL — SIGNIFICANT CHANGE UP (ref 1.6–2.6)
MCHC RBC-ENTMCNC: 28.7 PG — SIGNIFICANT CHANGE UP (ref 27–34)
MCHC RBC-ENTMCNC: 32.7 % — SIGNIFICANT CHANGE UP (ref 32–36)
MCV RBC AUTO: 87.9 FL — SIGNIFICANT CHANGE UP (ref 80–100)
NRBC # FLD: 0 K/UL — SIGNIFICANT CHANGE UP (ref 0–0)
PHOSPHATE SERPL-MCNC: 3.5 MG/DL — SIGNIFICANT CHANGE UP (ref 2.5–4.5)
PLATELET # BLD AUTO: 166 K/UL — SIGNIFICANT CHANGE UP (ref 150–400)
PMV BLD: 11 FL — SIGNIFICANT CHANGE UP (ref 7–13)
POTASSIUM SERPL-MCNC: 4.2 MMOL/L — SIGNIFICANT CHANGE UP (ref 3.5–5.3)
POTASSIUM SERPL-SCNC: 4.2 MMOL/L — SIGNIFICANT CHANGE UP (ref 3.5–5.3)
PROT SERPL-MCNC: 8 G/DL — SIGNIFICANT CHANGE UP (ref 6–8.3)
RBC # BLD: 4.46 M/UL — SIGNIFICANT CHANGE UP (ref 4.2–5.8)
RBC # FLD: 13.5 % — SIGNIFICANT CHANGE UP (ref 10.3–14.5)
SODIUM SERPL-SCNC: 131 MMOL/L — LOW (ref 135–145)
SPECIMEN SOURCE: SIGNIFICANT CHANGE UP
WBC # BLD: 4.09 K/UL — SIGNIFICANT CHANGE UP (ref 3.8–10.5)
WBC # FLD AUTO: 4.09 K/UL — SIGNIFICANT CHANGE UP (ref 3.8–10.5)

## 2020-04-07 RX ORDER — DEXTROSE 50 % IN WATER 50 %
12.5 SYRINGE (ML) INTRAVENOUS ONCE
Refills: 0 | Status: COMPLETED | OUTPATIENT
Start: 2020-04-07 | End: 2020-04-07

## 2020-04-07 RX ORDER — DEXTROSE 50 % IN WATER 50 %
15 SYRINGE (ML) INTRAVENOUS ONCE
Refills: 0 | Status: COMPLETED | OUTPATIENT
Start: 2020-04-07 | End: 2020-04-07

## 2020-04-07 RX ORDER — LIDOCAINE 4 G/100G
1 CREAM TOPICAL DAILY
Refills: 0 | Status: DISCONTINUED | OUTPATIENT
Start: 2020-04-07 | End: 2020-04-08

## 2020-04-07 RX ORDER — LIDOCAINE 4 G/100G
1 CREAM TOPICAL
Qty: 0 | Refills: 0 | DISCHARGE
Start: 2020-04-07

## 2020-04-07 RX ORDER — OXYCODONE HYDROCHLORIDE 5 MG/1
2.5 TABLET ORAL ONCE
Refills: 0 | Status: DISCONTINUED | OUTPATIENT
Start: 2020-04-07 | End: 2020-04-07

## 2020-04-07 RX ORDER — DEXTROSE 50 % IN WATER 50 %
15 SYRINGE (ML) INTRAVENOUS
Refills: 0 | Status: DISCONTINUED | OUTPATIENT
Start: 2020-04-07 | End: 2020-04-08

## 2020-04-07 RX ORDER — DEXTROSE 50 % IN WATER 50 %
15 SYRINGE (ML) INTRAVENOUS
Qty: 0 | Refills: 0 | DISCHARGE
Start: 2020-04-07 | End: 2020-04-17

## 2020-04-07 RX ORDER — OXYCODONE HYDROCHLORIDE 5 MG/1
5 TABLET ORAL ONCE
Refills: 0 | Status: DISCONTINUED | OUTPATIENT
Start: 2020-04-07 | End: 2020-04-07

## 2020-04-07 RX ADMIN — LIDOCAINE 1 PATCH: 4 CREAM TOPICAL at 19:20

## 2020-04-07 RX ADMIN — Medication 1334 MILLIGRAM(S): at 09:22

## 2020-04-07 RX ADMIN — Medication 81 MILLIGRAM(S): at 11:37

## 2020-04-07 RX ADMIN — LIDOCAINE 1 PATCH: 4 CREAM TOPICAL at 11:37

## 2020-04-07 RX ADMIN — Medication 12.5 GRAM(S): at 09:50

## 2020-04-07 RX ADMIN — HEPARIN SODIUM 5000 UNIT(S): 5000 INJECTION INTRAVENOUS; SUBCUTANEOUS at 22:34

## 2020-04-07 RX ADMIN — Medication 1334 MILLIGRAM(S): at 18:09

## 2020-04-07 RX ADMIN — OXYCODONE HYDROCHLORIDE 2.5 MILLIGRAM(S): 5 TABLET ORAL at 06:50

## 2020-04-07 RX ADMIN — CHLORHEXIDINE GLUCONATE 1 APPLICATION(S): 213 SOLUTION TOPICAL at 11:36

## 2020-04-07 RX ADMIN — ATORVASTATIN CALCIUM 40 MILLIGRAM(S): 80 TABLET, FILM COATED ORAL at 22:34

## 2020-04-07 RX ADMIN — Medication 40 MILLIGRAM(S): at 05:45

## 2020-04-07 RX ADMIN — Medication 1 TABLET(S): at 11:37

## 2020-04-07 RX ADMIN — Medication 40 MILLIGRAM(S): at 18:03

## 2020-04-07 RX ADMIN — Medication 1334 MILLIGRAM(S): at 15:37

## 2020-04-07 RX ADMIN — Medication 25 MILLILITER(S): at 11:36

## 2020-04-07 RX ADMIN — Medication 15 GRAM(S): at 09:28

## 2020-04-07 RX ADMIN — Medication 15 GRAM(S): at 22:35

## 2020-04-07 RX ADMIN — Medication 25 MILLILITER(S): at 05:45

## 2020-04-07 RX ADMIN — HEPARIN SODIUM 5000 UNIT(S): 5000 INJECTION INTRAVENOUS; SUBCUTANEOUS at 15:37

## 2020-04-07 RX ADMIN — Medication 60 MILLIGRAM(S): at 22:34

## 2020-04-07 RX ADMIN — Medication 650 MILLIGRAM(S): at 18:10

## 2020-04-07 RX ADMIN — Medication 25 MILLILITER(S): at 20:09

## 2020-04-07 RX ADMIN — Medication 15 GRAM(S): at 15:37

## 2020-04-07 RX ADMIN — SENNA PLUS 2 TABLET(S): 8.6 TABLET ORAL at 22:34

## 2020-04-07 RX ADMIN — Medication 1 TABLET(S): at 18:02

## 2020-04-07 RX ADMIN — Medication 650 MILLIGRAM(S): at 02:38

## 2020-04-07 RX ADMIN — LIDOCAINE 1 PATCH: 4 CREAM TOPICAL at 23:30

## 2020-04-07 RX ADMIN — HEPARIN SODIUM 5000 UNIT(S): 5000 INJECTION INTRAVENOUS; SUBCUTANEOUS at 05:45

## 2020-04-07 RX ADMIN — LACTULOSE 10 GRAM(S): 10 SOLUTION ORAL at 22:35

## 2020-04-07 NOTE — PROGRESS NOTE ADULT - PROBLEM SELECTOR PLAN 4
C/w ISS, FSG  - Has been hypoglycemic, will monitor closely.  - 25ml D50% q8h C/w ISS, FSG  - Has been hypoglycemic, given PO glucose gel standing TID, will monitor closely.

## 2020-04-07 NOTE — PROGRESS NOTE ADULT - ASSESSMENT
64 y/o M with hx of DM, HTN, right BKA,, left AKA, ESRD on HD TTS via AVF at [facility] presenting for missed dialysis    ESRD on HD via AVF TTS  will continue TTS schedule   Plan for IHD today   consent in chart  renal diet  monitor electrolytes  Has to be planed for discharge in a COVID positive HD unit post HD. His HD unit ([place]) will make the arrangement but needs to be medically cleared before discharge    Hyperkalemia  sec to missed hd  Plan for HD today   IHD per schedule   monitor    Anemia  at goal  no need for epo  monitor hb    HTN  Controlled today   uf with hd  monitor    Ckd-mbd  pth, phos are at goal   monitor calcium and phos daily    Fever  COVID positive

## 2020-04-07 NOTE — PROGRESS NOTE ADULT - PROBLEM SELECTOR PLAN 2
Patient spiked a fever to 100.7 on 4/3. On RA, satting 100.  - Found to be COVID 19 +  - CXR clear  - Has been afebrile x72 hours, ready for transfer to Nursing Home. Patient spiked a fever to 100.7 on 4/3. On RA, satting 100.  - Found to be COVID 19 +  - CXR clear  - Has been afebrile >72 hours, ready for transfer to Nursing Home.

## 2020-04-07 NOTE — PROGRESS NOTE ADULT - SUBJECTIVE AND OBJECTIVE BOX
[facility] [place] [facility]   MD Nathaniel Kendall MD, D.O. Ruoru Wong, PA    From 7 AM - 5 PM:  OFFICE: [phone]  Dr. Navarro cell: [phone]  Dr. Karimi cell: [phone]  Dr. Garza cell: [phone]  BECKI Sam cell: [phone]    From 5 PM - 7 AM: Answering Service: [phone]      RENAL FOLLOW UP NOTE  --------------------------------------------------------------------------------  HPI: Pt covid +     PAST HISTORY  --------------------------------------------------------------------------------  No significant changes to PMH, PSH, FHx, SHx, unless otherwise noted    ALLERGIES & MEDICATIONS  --------------------------------------------------------------------------------  Allergies    No Known Allergies    Intolerances      Standing Inpatient Medications  aspirin enteric coated 81 milliGRAM(s) Oral daily  atorvastatin 40 milliGRAM(s) Oral at bedtime  calcium acetate 1334 milliGRAM(s) Oral three times a day with meals  chlorhexidine 4% Liquid 1 Application(s) Topical daily  dextrose 40% Gel 15 Gram(s) Oral <User Schedule>  dextrose 5%. 1000 milliLiter(s) IV Continuous <Continuous>  dextrose 50% Injectable 12.5 Gram(s) IV Push once  dextrose 50% Injectable 25 Gram(s) IV Push once  dextrose 50% Injectable 25 Gram(s) IV Push once  dextrose 50% Injectable 25 milliLiter(s) IV Push <User Schedule>  furosemide    Tablet 40 milliGRAM(s) Oral two times a day  heparin  Injectable 5000 Unit(s) SubCutaneous three times a day  insulin lispro (HumaLOG) corrective regimen sliding scale   SubCutaneous three times a day before meals  insulin lispro (HumaLOG) corrective regimen sliding scale   SubCutaneous at bedtime  labetalol 100 milliGRAM(s) Oral two times a day  lactobacillus acidophilus 1 Tablet(s) Oral daily  lactulose Syrup 10 Gram(s) Oral at bedtime  lidocaine   Patch 1 Patch Transdermal daily  multivitamin 1 Tablet(s) Oral daily  NIFEdipine XL 60 milliGRAM(s) Oral at bedtime  senna 2 Tablet(s) Oral at bedtime    PRN Inpatient Medications  acetaminophen   Tablet .. 650 milliGRAM(s) Oral every 6 hours PRN  dextrose 40% Gel 15 Gram(s) Oral once PRN  glucagon  Injectable 1 milliGRAM(s) IntraMuscular once PRN      REVIEW OF SYSTEMS  --------------------------------------------------------------------------------  Unable to obtain     VITALS/PHYSICAL EXAM  --------------------------------------------------------------------------------  T(C): 36.6 (04-07-20 @ 12:11), Max: 36.6 (04-07-20 @ 12:11)  HR: 55 (04-07-20 @ 12:11) (55 - 63)  BP: 158/69 (04-07-20 @ 12:11) (129/55 - 158/69)  RR: 18 (04-07-20 @ 12:11) (18 - 18)  SpO2: 100% (04-07-20 @ 05:43) (100% - 100%)  Wt(kg): --    04-07-20 @ 07:01  -  04-07-20 @ 13:56  --------------------------------------------------------  IN: 400 mL / OUT: 1400 mL / NET: -1000 mL      LABS/STUDIES  --------------------------------------------------------------------------------              12.8   4.09  >-----------<  166      [04-07-20 @ 07:35]              39.2     131  |  90  |  90  ----------------------------<  78      [04-07-20 @ 07:35]  4.2   |  23  |  7.75        Ca     8.5     [04-07-20 @ 07:35]      Mg     2.1     [04-07-20 @ 07:35]      Phos  3.5     [04-07-20 @ 07:35]    TPro  8.0  /  Alb  3.6  /  TBili  0.3  /  DBili  x   /  AST  36  /  ALT  45  /  AlkPhos  102  [04-07-20 @ 07:35]    Creatinine Trend:  SCr 7.75 [04-07 @ 07:35]  SCr 7.25 [04-06 @ 05:10]  SCr 5.41 [04-05 @ 05:20]  SCr 7.04 [04-04 @ 05:20]  SCr 5.57 [04-03 @ 07:00]    .3 (Ca --)      [04-01-20 @ 11:03]   --  HbA1c 4.2      [07-19-19 @ 09:56]    HBsAg NEGATIVE      [04-01-20 @ 11:03]

## 2020-04-07 NOTE — PROGRESS NOTE ADULT - ASSESSMENT
Patient is a 66 y/o M with hx of DM, HTN, right BKA,, left AKA, ESRD on HD M/W/F at [facility] presenting with hyperkalemia 2/2 missed dialysis. Found to be COVID + after spiking a fever.     Potential discharge today. Patient is a 64 y/o M with hx of DM, HTN, right BKA,, left AKA, ESRD on HD M/W/F at [facility] presenting with hyperkalemia 2/2 missed dialysis. Found to be COVID + after spiking a fever.

## 2020-04-07 NOTE — PROGRESS NOTE ADULT - SUBJECTIVE AND OBJECTIVE BOX
PROGRESS NOTE:     CONTACT INFO:  EDGAR Alvarado MD PGY1  Pager: [phone] [place]/ [id number] [facility]    Patient is a 65y old  Male who presents with a chief complaint of Missed HD (04 Apr 2020 08:45)      SUBJECTIVE / OVERNIGHT EVENTS:    - Overnight, patient had complaints of pain at site of nodule on back. Examined by provider on call, no acute changes at site. Given oxy 2.5mg with resolved pain.   - Patient seen and evaluated at bedside. No fever/chills.    - Denies SOB at rest, chest pain, palpitations, abdominal pain, nausea/vomiting    ADDITIONAL REVIEW OF SYSTEMS:    MEDICATIONS  (STANDING):  aspirin enteric coated 81 milliGRAM(s) Oral daily  atorvastatin 40 milliGRAM(s) Oral at bedtime  calcium acetate 1334 milliGRAM(s) Oral three times a day with meals  chlorhexidine 4% Liquid 1 Application(s) Topical daily  dextrose 5%. 1000 milliLiter(s) (50 mL/Hr) IV Continuous <Continuous>  dextrose 50% Injectable 25 milliLiter(s) IV Push <User Schedule>  dextrose 50% Injectable 12.5 Gram(s) IV Push once  dextrose 50% Injectable 25 Gram(s) IV Push once  dextrose 50% Injectable 25 Gram(s) IV Push once  furosemide    Tablet 40 milliGRAM(s) Oral two times a day  heparin  Injectable 5000 Unit(s) SubCutaneous three times a day  insulin lispro (HumaLOG) corrective regimen sliding scale   SubCutaneous three times a day before meals  insulin lispro (HumaLOG) corrective regimen sliding scale   SubCutaneous at bedtime  labetalol 100 milliGRAM(s) Oral two times a day  lactobacillus acidophilus 1 Tablet(s) Oral daily  lactulose Syrup 10 Gram(s) Oral at bedtime  multivitamin 1 Tablet(s) Oral daily  NIFEdipine XL 60 milliGRAM(s) Oral at bedtime  senna 2 Tablet(s) Oral at bedtime    MEDICATIONS  (PRN):  acetaminophen   Tablet .. 650 milliGRAM(s) Oral every 6 hours PRN Temp greater or equal to 38C (100.4F), Moderate Pain (4 - 6)  dextrose 40% Gel 15 Gram(s) Oral once PRN Blood Glucose LESS THAN 70 milliGRAM(s)/deciliter  glucagon  Injectable 1 milliGRAM(s) IntraMuscular once PRN Glucose LESS THAN 70 milligrams/deciliter      CAPILLARY BLOOD GLUCOSE      POCT Blood Glucose.: 114 mg/dL (06 Apr 2020 21:38)  POCT Blood Glucose.: 75 mg/dL (06 Apr 2020 17:51)  POCT Blood Glucose.: 164 mg/dL (06 Apr 2020 12:35)  POCT Blood Glucose.: 74 mg/dL (06 Apr 2020 08:31)  POCT Blood Glucose.: 59 mg/dL (06 Apr 2020 08:29)    I&O's Summary      PHYSICAL EXAM:  Vital Signs Last 24 Hrs  T(C): 36.3 (06 Apr 2020 22:37), Max: 37.1 (06 Apr 2020 13:32)  T(F): 97.3 (06 Apr 2020 22:37), Max: 98.7 (06 Apr 2020 13:32)  HR: 63 (07 Apr 2020 05:43) (59 - 63)  BP: 129/55 (07 Apr 2020 05:43) (129/55 - 154/76)  BP(mean): --  RR: 18 (07 Apr 2020 05:43) (18 - 18)  SpO2: 100% (07 Apr 2020 05:43) (96% - 100%)    Constitutional: NAD, awake and alert  EYES: EOMI  ENT:  Normal Hearing, no tonsillar exudates   Neck: Soft and supple  Respiratory: Breath sounds are clear bilaterally, No wheezing, rales or rhonchi  Cardiovascular: S1 and S2, regular rate and rhythm, no Murmurs, gallops or rubs  Gastrointestinal: Bowel Sounds present, soft, nontender, nondistended, no guarding  Neurological: No focal deficits, CN II-XII intact bilaterally, sensation to light touch intact in all extremities.  Skin: 1.5inch nodule on upper back, nonerythematous, slightly tender to palpation, no discoloration, not draining    LABS:                        12.5   4.02  )-----------( 165      ( 06 Apr 2020 05:10 )             38.9     04-06    136  |  95<L>  |  76<H>  ----------------------------<  47<L>  5.5<H>   |  20<L>  |  7.25<H>    Ca    9.0      06 Apr 2020 05:10  Phos  4.6     04-06  Mg     2.2     04-06                  RADIOLOGY & ADDITIONAL TESTS:  Results Reviewed:   Imaging Personally Reviewed:  Electrocardiogram Personally Reviewed:    COORDINATION OF CARE:  Care Discussed with Consultants/Other Providers [Y/N]: Y  Prior or Outpatient Records Reviewed [Y/N]: Y PROGRESS NOTE:     CONTACT INFO:  EDGAR Alvarado MD PGY1  Pager: [phone] [place]/ [id number] [facility]    Patient is a 65y old  Male who presents with a chief complaint of Missed HD (04 Apr 2020 08:45)      SUBJECTIVE / OVERNIGHT EVENTS:    - Overnight, patient had complaints of pain at site of nodule on back. Examined by provider on call, no acute changes at site. Given oxy 2.5mg with resolved pain.   - Patient seen and evaluated at bedside. Patient was seen shortly after receiving glucose gel for FSG 70s.  - Patient reported pain at site of nodule on back.   - No fever/chills.    - During interview, patient vomited stomach contents.    ADDITIONAL REVIEW OF SYSTEMS:    MEDICATIONS  (STANDING):  aspirin enteric coated 81 milliGRAM(s) Oral daily  atorvastatin 40 milliGRAM(s) Oral at bedtime  calcium acetate 1334 milliGRAM(s) Oral three times a day with meals  chlorhexidine 4% Liquid 1 Application(s) Topical daily  dextrose 5%. 1000 milliLiter(s) (50 mL/Hr) IV Continuous <Continuous>  dextrose 50% Injectable 25 milliLiter(s) IV Push <User Schedule>  dextrose 50% Injectable 12.5 Gram(s) IV Push once  dextrose 50% Injectable 25 Gram(s) IV Push once  dextrose 50% Injectable 25 Gram(s) IV Push once  furosemide    Tablet 40 milliGRAM(s) Oral two times a day  heparin  Injectable 5000 Unit(s) SubCutaneous three times a day  insulin lispro (HumaLOG) corrective regimen sliding scale   SubCutaneous three times a day before meals  insulin lispro (HumaLOG) corrective regimen sliding scale   SubCutaneous at bedtime  labetalol 100 milliGRAM(s) Oral two times a day  lactobacillus acidophilus 1 Tablet(s) Oral daily  lactulose Syrup 10 Gram(s) Oral at bedtime  multivitamin 1 Tablet(s) Oral daily  NIFEdipine XL 60 milliGRAM(s) Oral at bedtime  senna 2 Tablet(s) Oral at bedtime    MEDICATIONS  (PRN):  acetaminophen   Tablet .. 650 milliGRAM(s) Oral every 6 hours PRN Temp greater or equal to 38C (100.4F), Moderate Pain (4 - 6)  dextrose 40% Gel 15 Gram(s) Oral once PRN Blood Glucose LESS THAN 70 milliGRAM(s)/deciliter  glucagon  Injectable 1 milliGRAM(s) IntraMuscular once PRN Glucose LESS THAN 70 milligrams/deciliter      CAPILLARY BLOOD GLUCOSE      POCT Blood Glucose.: 114 mg/dL (06 Apr 2020 21:38)  POCT Blood Glucose.: 75 mg/dL (06 Apr 2020 17:51)  POCT Blood Glucose.: 164 mg/dL (06 Apr 2020 12:35)  POCT Blood Glucose.: 74 mg/dL (06 Apr 2020 08:31)  POCT Blood Glucose.: 59 mg/dL (06 Apr 2020 08:29)    I&O's Summary      PHYSICAL EXAM:  Vital Signs Last 24 Hrs  T(C): 36.3 (06 Apr 2020 22:37), Max: 37.1 (06 Apr 2020 13:32)  T(F): 97.3 (06 Apr 2020 22:37), Max: 98.7 (06 Apr 2020 13:32)  HR: 63 (07 Apr 2020 05:43) (59 - 63)  BP: 129/55 (07 Apr 2020 05:43) (129/55 - 154/76)  BP(mean): --  RR: 18 (07 Apr 2020 05:43) (18 - 18)  SpO2: 100% (07 Apr 2020 05:43) (96% - 100%)    Constitutional: NAD, awake and alert  EYES: EOMI  ENT:  Normal Hearing, no tonsillar exudates   Neck: Soft and supple  Respiratory: Breath sounds are clear bilaterally, No wheezing, rales or rhonchi  Cardiovascular: S1 and S2, regular rate and rhythm, no Murmurs, gallops or rubs  Gastrointestinal: Bowel Sounds present, soft, nontender, nondistended, no guarding  Neurological: No focal deficits, CN II-XII intact bilaterally, sensation to light touch intact in all extremities.  Skin: 1.5inch nodule on upper back, nonerythematous, tender to palpation, no discoloration, not draining    LABS:  CBC Full  -  ( 07 Apr 2020 07:35 )  WBC Count : 4.09 K/uL  RBC Count : 4.46 M/uL  Hemoglobin : 12.8 g/dL  Hematocrit : 39.2 %  Platelet Count - Automated : 166 K/uL  Mean Cell Volume : 87.9 fL  Mean Cell Hemoglobin : 28.7 pg  Mean Cell Hemoglobin Concentration : 32.7 %  Auto Neutrophil # : x  Auto Lymphocyte # : x  Auto Monocyte # : x  Auto Eosinophil # : x  Auto Basophil # : x  Auto Neutrophil % : x  Auto Lymphocyte % : x  Auto Monocyte % : x  Auto Eosinophil % : x  Auto Basophil % : x    04-07    131<L>  |  90<L>  |  90<H>  ----------------------------<  78  4.2   |  23  |  7.75<H>    Ca    8.5      07 Apr 2020 07:35  Phos  3.5     04-07  Mg     2.1     04-07    TPro  8.0  /  Alb  3.6  /  TBili  0.3  /  DBili  x   /  AST  36  /  ALT  45<H>  /  AlkPhos  102  04-07    LIVER FUNCTIONS - ( 07 Apr 2020 07:35 )  Alb: 3.6 g/dL / Pro: 8.0 g/dL / ALK PHOS: 102 u/L / ALT: 45 u/L / AST: 36 u/L / GGT: x                             RADIOLOGY & ADDITIONAL TESTS:  Results Reviewed: Y  Imaging Personally Reviewed: n/a  Electrocardiogram Personally Reviewed: n/a    COORDINATION OF CARE:  Care Discussed with Consultants/Other Providers [Y/N]: Y  Prior or Outpatient Records Reviewed [Y/N]: Y

## 2020-04-07 NOTE — PROGRESS NOTE ADULT - PROBLEM SELECTOR PLAN 5
onset x1 month prior without recent growth. Not draining, not erythematous, likely not acutely infectious.  - rec outpatient follow-up onset x1 month prior without recent growth. Not draining, not erythematous. acute management not indicated at this time. possibly epidermoid cyst  - lidocaine patch daily PRN for pain  - rec outpatient follow-up

## 2020-04-07 NOTE — PROGRESS NOTE ADULT - PROBLEM SELECTOR PLAN 1
Pt missed dialysis, and was hyperkalemic to 7.2 on presentation. Pt also fluid overloaded on presentation. S/p HD (4/1) and (4/2) with improvement in potassium. Most recent 4/6 K = 5.5  - Plan for IHD today with potential discharge afterwards.  - C/w renal diet   - C/w phoslo   - Monitor BMPs, Ca, Phos  - s/p lokelma x1 (4/7)  - renal recs appreciated  - Plan to resume M,W,F schedule when he is discharged. Receiving HD inpatient, will reinstate outpatient HD at site that accepts COVID+. Pt missed dialysis, and was hyperkalemic to 7.2 on presentation. Pt also fluid overloaded on presentation. S/p HD (4/1, 4/2, 4/4, 4/7) with improvement in potassium. Most recent 4/7 K = 4.2 prior to HD and s/p lokelma x1.  - HD #4 today  - C/w renal diet   - C/w phoslo (calcium acetate)  - Monitor BMPs, Ca, Phos  - renal recs appreciated  - Plan to resume M,W,F schedule when he is discharged. Receiving HD inpatient, will reinstate outpatient HD at site that accepts COVID+.

## 2020-04-08 ENCOUNTER — TRANSCRIPTION ENCOUNTER (OUTPATIENT)
Age: 65
End: 2020-04-08

## 2020-04-08 VITALS — SYSTOLIC BLOOD PRESSURE: 128 MMHG | HEART RATE: 58 BPM | DIASTOLIC BLOOD PRESSURE: 61 MMHG | TEMPERATURE: 98 F

## 2020-04-08 RX ORDER — ACETAMINOPHEN 500 MG
650 TABLET ORAL ONCE
Refills: 0 | Status: DISCONTINUED | OUTPATIENT
Start: 2020-04-08 | End: 2020-04-08

## 2020-04-08 RX ADMIN — Medication 40 MILLIGRAM(S): at 05:06

## 2020-04-08 RX ADMIN — Medication 650 MILLIGRAM(S): at 05:06

## 2020-04-08 RX ADMIN — Medication 25 MILLILITER(S): at 12:45

## 2020-04-08 RX ADMIN — Medication 15 GRAM(S): at 05:04

## 2020-04-08 RX ADMIN — Medication 1 TABLET(S): at 12:44

## 2020-04-08 RX ADMIN — HEPARIN SODIUM 5000 UNIT(S): 5000 INJECTION INTRAVENOUS; SUBCUTANEOUS at 05:05

## 2020-04-08 RX ADMIN — CHLORHEXIDINE GLUCONATE 1 APPLICATION(S): 213 SOLUTION TOPICAL at 12:45

## 2020-04-08 RX ADMIN — Medication 15 GRAM(S): at 12:45

## 2020-04-08 RX ADMIN — Medication 25 MILLILITER(S): at 05:04

## 2020-04-08 RX ADMIN — Medication 1334 MILLIGRAM(S): at 08:59

## 2020-04-08 RX ADMIN — Medication 81 MILLIGRAM(S): at 12:44

## 2020-04-08 RX ADMIN — Medication 100 MILLIGRAM(S): at 05:06

## 2020-04-08 RX ADMIN — Medication 1334 MILLIGRAM(S): at 12:44

## 2020-04-08 NOTE — PROGRESS NOTE ADULT - PROBLEM SELECTOR PLAN 2
Patient spiked a fever to 100.7 on 4/3. Afeb since 4/3. Asymptomatic. On RA, satting 99%.  - Found to be COVID 19 +  - CXR clear  - Has been afebrile >72 hours, ready for transfer to Nursing Home.

## 2020-04-08 NOTE — PROGRESS NOTE ADULT - NSREFPHYEXREFTO_GEN_ALL_CORE
Inpatient Physical Exam

## 2020-04-08 NOTE — PROGRESS NOTE ADULT - PROBLEM SELECTOR PLAN 1
Pt missed dialysis, and was hyperkalemic to 7.2 on presentation. Pt also fluid overloaded on presentation. S/p HD (4/1, 4/2, 4/4, 4/7) with improvement in potassium. Most recent 4/7 K = 4.2 prior to HD and s/p lokelma x1.  - s/p HD #4 yesterday  - C/w renal diet   - C/w phoslo (calcium acetate)  - renal recs appreciated  - Plan to transition to Tu/Th/Sa HD once discharged.

## 2020-04-08 NOTE — DISCHARGE NOTE NURSING/CASE MANAGEMENT/SOCIAL WORK - PATIENT PORTAL LINK FT
You can access the FollowMyHealth Patient Portal offered by [facility] by registering at the following website: [email or web address]. By joining [employer]’s FollowMyHealth portal, you will also be able to view your health information using other applications (apps) compatible with our system.

## 2020-04-08 NOTE — PROGRESS NOTE ADULT - ASSESSMENT
64 y/o M with hx of DM, HTN, right BKA,, left AKA, ESRD on HD TTS via [facility] at [facility] presenting for missed dialysis    ESRD on HD via [facility] TTS  will continue TTS schedule   Last HD yesterday  Plan for HD tmrw  consent in chart  renal diet  monitor electrolytes  Has to be planed for discharge in a COVID positive HD unit post HD. His HD unit ([facility]) will make the arrangement but needs to be medically cleared before discharge  Pt cleared for discharge from nephrology once medically stable     Hyperkalemia  sec to missed hd  Improved  IHD per schedule   monitor    Anemia  at goal  no need for epo  monitor hb    HTN  Controlled today   uf with hd  monitor    Ckd-mbd  pth, phos are at goal   monitor calcium and phos daily    Fever  COVID positive

## 2020-04-08 NOTE — PROGRESS NOTE ADULT - PROBLEM SELECTOR PLAN 5
onset x1 month prior without recent growth. Not draining, not erythematous. acute management not indicated at this time. possibly epidermoid cyst  - lidocaine patch daily PRN for pain  - rec outpatient follow-up

## 2020-04-08 NOTE — PROVIDER CONTACT NOTE (OTHER) - RECOMMENDATIONS
Follow hypoglycemia protocol.
continue hypoglycemia protocol
Follow hypoglycemia protocol.
notify md, follow hypoglycemia protocol recheck

## 2020-04-08 NOTE — PROVIDER CONTACT NOTE (OTHER) - BACKGROUND
Patient with Type II DM, on standing Dextrose 50% 25mL three times daily.
pt admitted for hyperkalemia
Patient with Type II DM, on standing Dextrose 50% 25mL three times daily.
pt admitted for hyperkalemia

## 2020-04-08 NOTE — PROGRESS NOTE ADULT - PROVIDER SPECIALTY LIST ADULT
Internal Medicine
Nephrology
Nephrology
Internal Medicine
Internal Medicine
Nephrology
Internal Medicine
Nephrology
Internal Medicine

## 2020-04-08 NOTE — PROGRESS NOTE ADULT - SUBJECTIVE AND OBJECTIVE BOX
[facility] [place] [facility]   MD Nathaniel Kendall MD, D.O. Ruoru Wong, PA    From 7 AM - 5 PM:  OFFICE: [phone]  Dr. Navarro cell: [phone]  Dr. Karimi cell: [phone]  Dr. Garza cell: [phone]  BECKI Sam cell: [phone]    From 5 PM - 7 AM: Answering Service: [phone]      RENAL FOLLOW UP NOTE  --------------------------------------------------------------------------------  HPI: Pt covid +     PAST HISTORY  --------------------------------------------------------------------------------  No significant changes to PMH, PSH, FHx, SHx, unless otherwise noted    ALLERGIES & MEDICATIONS  --------------------------------------------------------------------------------  Allergies    No Known Allergies    Intolerances      Standing Inpatient Medications  aspirin enteric coated 81 milliGRAM(s) Oral daily  atorvastatin 40 milliGRAM(s) Oral at bedtime  calcium acetate 1334 milliGRAM(s) Oral three times a day with meals  chlorhexidine 4% Liquid 1 Application(s) Topical daily  dextrose 40% Gel 15 Gram(s) Oral <User Schedule>  dextrose 5%. 1000 milliLiter(s) IV Continuous <Continuous>  dextrose 50% Injectable 12.5 Gram(s) IV Push once  dextrose 50% Injectable 25 Gram(s) IV Push once  dextrose 50% Injectable 25 Gram(s) IV Push once  dextrose 50% Injectable 25 milliLiter(s) IV Push <User Schedule>  furosemide    Tablet 40 milliGRAM(s) Oral two times a day  heparin  Injectable 5000 Unit(s) SubCutaneous three times a day  insulin lispro (HumaLOG) corrective regimen sliding scale   SubCutaneous three times a day before meals  insulin lispro (HumaLOG) corrective regimen sliding scale   SubCutaneous at bedtime  labetalol 100 milliGRAM(s) Oral two times a day  lactobacillus acidophilus 1 Tablet(s) Oral daily  lactulose Syrup 10 Gram(s) Oral at bedtime  lidocaine   Patch 1 Patch Transdermal daily  multivitamin 1 Tablet(s) Oral daily  NIFEdipine XL 60 milliGRAM(s) Oral at bedtime  senna 2 Tablet(s) Oral at bedtime    PRN Inpatient Medications  acetaminophen   Tablet .. 650 milliGRAM(s) Oral every 6 hours PRN  dextrose 40% Gel 15 Gram(s) Oral once PRN  glucagon  Injectable 1 milliGRAM(s) IntraMuscular once PRN      REVIEW OF SYSTEMS  --------------------------------------------------------------------------------  Unable to obtain     VITALS/PHYSICAL EXAM  --------------------------------------------------------------------------------  T(C): 36.8 (04-08-20 @ 12:36), Max: 37 (04-07-20 @ 22:28)  HR: 58 (04-08-20 @ 12:36) (58 - 64)  BP: 128/61 (04-08-20 @ 12:36) (128/61 - 148/70)  RR: 18 (04-08-20 @ 05:00) (18 - 18)  SpO2: 99% (04-08-20 @ 05:00) (99% - 100%)  Wt(kg): --        04-07-20 @ 07:01  -  04-08-20 @ 07:00  --------------------------------------------------------  IN: 400 mL / OUT: 1300 mL / NET: -900 mL    LABS/STUDIES  --------------------------------------------------------------------------------              12.8   4.09  >-----------<  166      [04-07-20 @ 07:35]              39.2     131  |  90  |  90  ----------------------------<  78      [04-07-20 @ 07:35]  4.2   |  23  |  7.75        Ca     8.5     [04-07-20 @ 07:35]      Mg     2.1     [04-07-20 @ 07:35]      Phos  3.5     [04-07-20 @ 07:35]    TPro  8.0  /  Alb  3.6  /  TBili  0.3  /  DBili  x   /  AST  36  /  ALT  45  /  AlkPhos  102  [04-07-20 @ 07:35]    Creatinine Trend:  SCr 7.75 [04-07 @ 07:35]  SCr 7.25 [04-06 @ 05:10]  SCr 5.41 [04-05 @ 05:20]  SCr 7.04 [04-04 @ 05:20]  SCr 5.57 [04-03 @ 07:00]    .3 (Ca --)      [04-01-20 @ 11:03]   --  HbA1c 4.2      [07-19-19 @ 09:56]    HBsAg NEGATIVE      [04-01-20 @ 11:03]

## 2020-04-08 NOTE — PROGRESS NOTE ADULT - PROBLEM SELECTOR PROBLEM 3
Benign essential hypertension
Controlled type 2 diabetes mellitus without complication, with long-term current use of insulin
Benign essential hypertension

## 2020-04-08 NOTE — PROGRESS NOTE ADULT - ASSESSMENT
Patient is a 66 y/o M with hx of DM, HTN, right BKA,, left AKA, ESRD on HD M/W/F at [facility] presenting with hyperkalemia 2/2 missed dialysis. Found to be COVID + after spiking a fever. Afeb >72hr, cleared for discharge.    Potential discharge today. Patient is a 64 y/o M with hx of DM, HTN, right BKA,, left AKA, ESRD on HD M/W/F at [facility] presenting with hyperkalemia 2/2 missed dialysis. Found to be COVID + after spiking a fever. Afeb >72hr, cleared for discharge.    Discharge today.

## 2020-04-08 NOTE — PROGRESS NOTE ADULT - SUBJECTIVE AND OBJECTIVE BOX
PROGRESS NOTE:     CONTACT INFO:  EDGAR Alvarado MD PGY1  Pager: [phone] [place]/ [id number] [facility]    Patient is a 65y old  Male who presents with a chief complaint of Missed HD (04 Apr 2020 08:45)      SUBJECTIVE / OVERNIGHT EVENTS:    - No acute events overnight.   - No fevers/chills.  - Patient seen and evaluated at bedside.  - Denies SOB at rest, chest pain, palpitations, abdominal pain, nausea/vomiting    ADDITIONAL REVIEW OF SYSTEMS:    MEDICATIONS  (STANDING):  aspirin enteric coated 81 milliGRAM(s) Oral daily  atorvastatin 40 milliGRAM(s) Oral at bedtime  calcium acetate 1334 milliGRAM(s) Oral three times a day with meals  chlorhexidine 4% Liquid 1 Application(s) Topical daily  dextrose 40% Gel 15 Gram(s) Oral <User Schedule>  dextrose 5%. 1000 milliLiter(s) (50 mL/Hr) IV Continuous <Continuous>  dextrose 50% Injectable 12.5 Gram(s) IV Push once  dextrose 50% Injectable 25 Gram(s) IV Push once  dextrose 50% Injectable 25 Gram(s) IV Push once  dextrose 50% Injectable 25 milliLiter(s) IV Push <User Schedule>  furosemide    Tablet 40 milliGRAM(s) Oral two times a day  heparin  Injectable 5000 Unit(s) SubCutaneous three times a day  insulin lispro (HumaLOG) corrective regimen sliding scale   SubCutaneous three times a day before meals  insulin lispro (HumaLOG) corrective regimen sliding scale   SubCutaneous at bedtime  labetalol 100 milliGRAM(s) Oral two times a day  lactobacillus acidophilus 1 Tablet(s) Oral daily  lactulose Syrup 10 Gram(s) Oral at bedtime  lidocaine   Patch 1 Patch Transdermal daily  multivitamin 1 Tablet(s) Oral daily  NIFEdipine XL 60 milliGRAM(s) Oral at bedtime  senna 2 Tablet(s) Oral at bedtime    MEDICATIONS  (PRN):  acetaminophen   Tablet .. 650 milliGRAM(s) Oral every 6 hours PRN Temp greater or equal to 38C (100.4F), Moderate Pain (4 - 6)  dextrose 40% Gel 15 Gram(s) Oral once PRN Blood Glucose LESS THAN 70 milliGRAM(s)/deciliter  glucagon  Injectable 1 milliGRAM(s) IntraMuscular once PRN Glucose LESS THAN 70 milligrams/deciliter      CAPILLARY BLOOD GLUCOSE      POCT Blood Glucose.: 128 mg/dL (07 Apr 2020 22:22)  POCT Blood Glucose.: 92 mg/dL (07 Apr 2020 17:12)  POCT Blood Glucose.: 87 mg/dL (07 Apr 2020 13:11)  POCT Blood Glucose.: 127 mg/dL (07 Apr 2020 10:03)  POCT Blood Glucose.: 132 mg/dL (07 Apr 2020 10:02)  POCT Blood Glucose.: 79 mg/dL (07 Apr 2020 09:43)  POCT Blood Glucose.: 71 mg/dL (07 Apr 2020 09:27)  POCT Blood Glucose.: 66 mg/dL (07 Apr 2020 09:26)  POCT Blood Glucose.: 65 mg/dL (07 Apr 2020 09:03)  POCT Blood Glucose.: 64 mg/dL (07 Apr 2020 09:01)    I&O's Summary    07 Apr 2020 07:01  -  08 Apr 2020 07:00  --------------------------------------------------------  IN: 400 mL / OUT: 1300 mL / NET: -900 mL        PHYSICAL EXAM:  Vital Signs Last 24 Hrs  T(C): 37 (07 Apr 2020 22:28), Max: 37 (07 Apr 2020 22:28)  T(F): 98.6 (07 Apr 2020 22:28), Max: 98.6 (07 Apr 2020 22:28)  HR: 64 (08 Apr 2020 05:00) (55 - 64)  BP: 136/58 (08 Apr 2020 05:00) (136/58 - 160/-)  BP(mean): --  RR: 18 (08 Apr 2020 05:00) (18 - 18)  SpO2: 99% (08 Apr 2020 05:00) (99% - 100%)    Constitutional: NAD, awake and alert  EYES: EOMI  ENT:  Normal Hearing, no tonsillar exudates   Neck: Soft and supple  Respiratory: Breath sounds are clear bilaterally, No wheezing, rales or rhonchi  Cardiovascular: S1 and S2, regular rate and rhythm, no Murmurs, gallops or rubs  Gastrointestinal: Bowel Sounds present, soft, nontender, nondistended, no guarding  Neurological: No focal deficits, CN II-XII intact bilaterally, sensation to light touch intact in all extremities.  Skin: 1.5inch nodule on upper back, nonerythematous, tender to palpation, no discoloration, not draining    LABS:                        12.8   4.09  )-----------( 166      ( 07 Apr 2020 07:35 )             39.2     04-07    131<L>  |  90<L>  |  90<H>  ----------------------------<  78  4.2   |  23  |  7.75<H>    Ca    8.5      07 Apr 2020 07:35  Phos  3.5     04-07  Mg     2.1     04-07    TPro  8.0  /  Alb  3.6  /  TBili  0.3  /  DBili  x   /  AST  36  /  ALT  45<H>  /  AlkPhos  102  04-07                RADIOLOGY & ADDITIONAL TESTS:  Results Reviewed:   Imaging Personally Reviewed:  Electrocardiogram Personally Reviewed:    COORDINATION OF CARE:  Care Discussed with Consultants/Other Providers [Y/N]: Y  Prior or Outpatient Records Reviewed [Y/N]: Y PROGRESS NOTE:     CONTACT INFO:  EDGAR Alvarado MD PGY1  Pager: [phone] [place]/ [id number] [facility]    Patient is a 65y old  Male who presents with a chief complaint of Missed HD (04 Apr 2020 08:45)      SUBJECTIVE / OVERNIGHT EVENTS:    - No acute events overnight.   - No fevers/chills.  - Patient seen and evaluated at bedside. Complaints of back pain at site of chronic nodule, better with lidocaine patch.  - Denies SOB at rest, chest pain, palpitations, abdominal pain, nausea/vomiting    ADDITIONAL REVIEW OF SYSTEMS:    MEDICATIONS  (STANDING):  aspirin enteric coated 81 milliGRAM(s) Oral daily  atorvastatin 40 milliGRAM(s) Oral at bedtime  calcium acetate 1334 milliGRAM(s) Oral three times a day with meals  chlorhexidine 4% Liquid 1 Application(s) Topical daily  dextrose 40% Gel 15 Gram(s) Oral <User Schedule>  dextrose 5%. 1000 milliLiter(s) (50 mL/Hr) IV Continuous <Continuous>  dextrose 50% Injectable 12.5 Gram(s) IV Push once  dextrose 50% Injectable 25 Gram(s) IV Push once  dextrose 50% Injectable 25 Gram(s) IV Push once  dextrose 50% Injectable 25 milliLiter(s) IV Push <User Schedule>  furosemide    Tablet 40 milliGRAM(s) Oral two times a day  heparin  Injectable 5000 Unit(s) SubCutaneous three times a day  insulin lispro (HumaLOG) corrective regimen sliding scale   SubCutaneous three times a day before meals  insulin lispro (HumaLOG) corrective regimen sliding scale   SubCutaneous at bedtime  labetalol 100 milliGRAM(s) Oral two times a day  lactobacillus acidophilus 1 Tablet(s) Oral daily  lactulose Syrup 10 Gram(s) Oral at bedtime  lidocaine   Patch 1 Patch Transdermal daily  multivitamin 1 Tablet(s) Oral daily  NIFEdipine XL 60 milliGRAM(s) Oral at bedtime  senna 2 Tablet(s) Oral at bedtime    MEDICATIONS  (PRN):  acetaminophen   Tablet .. 650 milliGRAM(s) Oral every 6 hours PRN Temp greater or equal to 38C (100.4F), Moderate Pain (4 - 6)  dextrose 40% Gel 15 Gram(s) Oral once PRN Blood Glucose LESS THAN 70 milliGRAM(s)/deciliter  glucagon  Injectable 1 milliGRAM(s) IntraMuscular once PRN Glucose LESS THAN 70 milligrams/deciliter      CAPILLARY BLOOD GLUCOSE      POCT Blood Glucose.: 128 mg/dL (07 Apr 2020 22:22)  POCT Blood Glucose.: 92 mg/dL (07 Apr 2020 17:12)  POCT Blood Glucose.: 87 mg/dL (07 Apr 2020 13:11)  POCT Blood Glucose.: 127 mg/dL (07 Apr 2020 10:03)  POCT Blood Glucose.: 132 mg/dL (07 Apr 2020 10:02)  POCT Blood Glucose.: 79 mg/dL (07 Apr 2020 09:43)  POCT Blood Glucose.: 71 mg/dL (07 Apr 2020 09:27)  POCT Blood Glucose.: 66 mg/dL (07 Apr 2020 09:26)  POCT Blood Glucose.: 65 mg/dL (07 Apr 2020 09:03)  POCT Blood Glucose.: 64 mg/dL (07 Apr 2020 09:01)    I&O's Summary    07 Apr 2020 07:01  -  08 Apr 2020 07:00  --------------------------------------------------------  IN: 400 mL / OUT: 1300 mL / NET: -900 mL        PHYSICAL EXAM:  Vital Signs Last 24 Hrs  T(C): 37 (07 Apr 2020 22:28), Max: 37 (07 Apr 2020 22:28)  T(F): 98.6 (07 Apr 2020 22:28), Max: 98.6 (07 Apr 2020 22:28)  HR: 64 (08 Apr 2020 05:00) (55 - 64)  BP: 136/58 (08 Apr 2020 05:00) (136/58 - 160/-)  BP(mean): --  RR: 18 (08 Apr 2020 05:00) (18 - 18)  SpO2: 99% (08 Apr 2020 05:00) (99% - 100%)    Constitutional: NAD, awake and alert  EYES: EOMI  ENT:  Normal Hearing, no tonsillar exudates   Respiratory: Breath sounds are clear bilaterally, No wheezing, rales or rhonchi  Cardiovascular: S1 and S2, regular rate and rhythm, no Murmurs, gallops or rubs  Gastrointestinal: Bowel Sounds present, soft, nontender, nondistended, no guarding  Neurological: No focal deficits, CN II-XII intact bilaterally  Skin: 1.5inch nodule on upper back, nonerythematous, tender to palpation, no discoloration, not draining    LABS:     no new labs                RADIOLOGY & ADDITIONAL TESTS:  Results Reviewed: Y  Imaging Personally Reviewed: Y  Electrocardiogram Personally Reviewed:Y    COORDINATION OF CARE:  Care Discussed with Consultants/Other Providers [Y/N]: Y  Prior or Outpatient Records Reviewed [Y/N]: Y

## 2020-04-08 NOTE — PROVIDER CONTACT NOTE (OTHER) - ACTION/TREATMENT ORDERED:
MD made aware, stated to follow hypoglycemic protocol. Will continue to monitor.
follow hypoglycemia protocol
MD made aware, stated to follow hypoglycemic protocol. Will continue to monitor.
continue hypoglycemia protocol

## 2020-04-08 NOTE — PROGRESS NOTE ADULT - PROBLEM SELECTOR PLAN 4
C/w ISS, FSG  - Has been hypoglycemic, given PO glucose gel standing TID, will monitor closely. C/w ISS, FSG. Hypoglycemic this AM, FSG 62 after receiving 15g dextrose gel.  - Given juice and breakfast, repeat FSG 82.  - glucose gel 15mg tid  - encourage meals and hydration

## 2020-04-08 NOTE — PROGRESS NOTE ADULT - PROBLEM SELECTOR PLAN 3
C/w Nifedipine and labetalol
/58.  C/w Nifedipine and labetalol
/55.  C/w Nifedipine and labetalol
C/w Nifedipine and labetalol
C/w ISS, FSG

## 2020-04-08 NOTE — PROGRESS NOTE ADULT - PROBLEM SELECTOR PLAN 7
Transitions of Care Status:  1.  Name of PCP: Dr. Arian Colon   2.  PCP Contacted on Admission: [ ] Y    [ ] N    3.  PCP contacted at Discharge: [ ] Y    [ ] N    [ ] N/A  4.  Post-Discharge Appointment Date and Location:  5.  Summary of Handoff given to PCP: Transitions of Care Status:  1.  Name of PCP: Dr. Mac   2.  PCP Contacted on Admission: [X] Y    [ ] N    3.  PCP contacted at Discharge: [X] Y    [ ] N    [ ] N/A  4.  Post-Discharge Appointment Date and Location: Seen by PCP at NH  5.  Summary of Handoff given to PCP: Y

## 2020-04-08 NOTE — PROGRESS NOTE ADULT - PROBLEM SELECTOR PROBLEM 4
Controlled type 2 diabetes mellitus without complication, with long-term current use of insulin
Prophylactic measure
Controlled type 2 diabetes mellitus without complication, with long-term current use of insulin

## 2020-04-08 NOTE — PROVIDER CONTACT NOTE (OTHER) - ASSESSMENT
Blood glucose of 64mg/dl, repeat 65mg/dl  Patient given apple juice, recheck in 15 mins, blood glucose of 60mg/dl, repeat 71mg/dl. Glucose gel given, recheck in 15 mins, blood glucose of 79mg/dl, dextrose 50% 12.5 G given, recheck in 15 mins, blood glucose of 132mg/dl, repeat 127mg/dl.
Blood glucose of 67  Patient given apple juice, recheck in 15 mins, blood glucose of 82
pt asymptomatic eating breakfast no signs of distress
pt asymptomatic with no signs of distress

## 2020-04-16 NOTE — PATIENT PROFILE ADULT. - PRESSURE ULCER(S)
no

## 2020-05-13 ENCOUNTER — INPATIENT (INPATIENT)
Facility: HOSPITAL | Age: 65
LOS: 1 days | Discharge: NURSING, REHAB OR LONG-TERM CARE | End: 2020-05-15
Attending: INTERNAL MEDICINE | Admitting: INTERNAL MEDICINE
Payer: MEDICAID

## 2020-05-13 VITALS
SYSTOLIC BLOOD PRESSURE: 212 MMHG | TEMPERATURE: 98 F | RESPIRATION RATE: 16 BRPM | HEART RATE: 69 BPM | DIASTOLIC BLOOD PRESSURE: 77 MMHG | OXYGEN SATURATION: 100 %

## 2020-05-13 DIAGNOSIS — Z89.612 ACQUIRED ABSENCE OF LEFT LEG ABOVE KNEE: Chronic | ICD-10-CM

## 2020-05-13 DIAGNOSIS — N18.6 END STAGE RENAL DISEASE: ICD-10-CM

## 2020-05-13 DIAGNOSIS — R11.2 NAUSEA WITH VOMITING, UNSPECIFIED: ICD-10-CM

## 2020-05-13 DIAGNOSIS — Z89.511 ACQUIRED ABSENCE OF RIGHT LEG BELOW KNEE: Chronic | ICD-10-CM

## 2020-05-13 DIAGNOSIS — Z89.611 ACQUIRED ABSENCE OF RIGHT LEG ABOVE KNEE: Chronic | ICD-10-CM

## 2020-05-13 DIAGNOSIS — I10 ESSENTIAL (PRIMARY) HYPERTENSION: ICD-10-CM

## 2020-05-13 DIAGNOSIS — E11.9 TYPE 2 DIABETES MELLITUS WITHOUT COMPLICATIONS: ICD-10-CM

## 2020-05-13 DIAGNOSIS — R11.10 VOMITING, UNSPECIFIED: ICD-10-CM

## 2020-05-13 DIAGNOSIS — Z98.890 OTHER SPECIFIED POSTPROCEDURAL STATES: Chronic | ICD-10-CM

## 2020-05-13 LAB
ALBUMIN SERPL ELPH-MCNC: 3.9 G/DL — SIGNIFICANT CHANGE UP (ref 3.3–5)
ALP SERPL-CCNC: 117 U/L — SIGNIFICANT CHANGE UP (ref 40–120)
ALT FLD-CCNC: 31 U/L — SIGNIFICANT CHANGE UP (ref 4–41)
ANION GAP SERPL CALC-SCNC: 14 MMO/L — SIGNIFICANT CHANGE UP (ref 7–14)
APTT BLD: 32.4 SEC — SIGNIFICANT CHANGE UP (ref 27.5–36.3)
AST SERPL-CCNC: 37 U/L — SIGNIFICANT CHANGE UP (ref 4–40)
BASE EXCESS BLDV CALC-SCNC: 8.2 MMOL/L — SIGNIFICANT CHANGE UP
BASOPHILS # BLD AUTO: 0.1 K/UL — SIGNIFICANT CHANGE UP (ref 0–0.2)
BASOPHILS NFR BLD AUTO: 1.3 % — SIGNIFICANT CHANGE UP (ref 0–2)
BILIRUB SERPL-MCNC: 0.3 MG/DL — SIGNIFICANT CHANGE UP (ref 0.2–1.2)
BLD GP AB SCN SERPL QL: NEGATIVE — SIGNIFICANT CHANGE UP
BLOOD GAS VENOUS - CREATININE: 4.54 MG/DL — HIGH (ref 0.5–1.3)
BLOOD GAS VENOUS - FIO2: 21 — SIGNIFICANT CHANGE UP
BUN SERPL-MCNC: 52 MG/DL — HIGH (ref 7–23)
CALCIUM SERPL-MCNC: 10.1 MG/DL — SIGNIFICANT CHANGE UP (ref 8.4–10.5)
CHLORIDE BLDV-SCNC: 92 MMOL/L — LOW (ref 96–108)
CHLORIDE SERPL-SCNC: 86 MMOL/L — LOW (ref 98–107)
CO2 SERPL-SCNC: 31 MMOL/L — SIGNIFICANT CHANGE UP (ref 22–31)
CREAT SERPL-MCNC: 4.47 MG/DL — HIGH (ref 0.5–1.3)
EOSINOPHIL # BLD AUTO: 0.31 K/UL — SIGNIFICANT CHANGE UP (ref 0–0.5)
EOSINOPHIL NFR BLD AUTO: 4.1 % — SIGNIFICANT CHANGE UP (ref 0–6)
GAS PNL BLDV: 135 MMOL/L — LOW (ref 136–146)
GLUCOSE BLDV-MCNC: 114 MG/DL — HIGH (ref 70–99)
GLUCOSE SERPL-MCNC: 118 MG/DL — HIGH (ref 70–99)
HBV SURFACE AG SER-ACNC: NEGATIVE — SIGNIFICANT CHANGE UP
HCO3 BLDV-SCNC: 31 MMOL/L — HIGH (ref 20–27)
HCT VFR BLD CALC: 33.9 % — LOW (ref 39–50)
HCT VFR BLDV CALC: 34.5 % — LOW (ref 39–51)
HGB BLD-MCNC: 10.9 G/DL — LOW (ref 13–17)
HGB BLDV-MCNC: 11.2 G/DL — LOW (ref 13–17)
IMM GRANULOCYTES NFR BLD AUTO: 0.5 % — SIGNIFICANT CHANGE UP (ref 0–1.5)
INR BLD: 0.96 — SIGNIFICANT CHANGE UP (ref 0.88–1.17)
LACTATE BLDV-MCNC: 1.8 MMOL/L — SIGNIFICANT CHANGE UP (ref 0.5–2)
LIDOCAIN IGE QN: 117.7 U/L — HIGH (ref 7–60)
LYMPHOCYTES # BLD AUTO: 1.16 K/UL — SIGNIFICANT CHANGE UP (ref 1–3.3)
LYMPHOCYTES # BLD AUTO: 15.3 % — SIGNIFICANT CHANGE UP (ref 13–44)
MCHC RBC-ENTMCNC: 29.1 PG — SIGNIFICANT CHANGE UP (ref 27–34)
MCHC RBC-ENTMCNC: 32.2 % — SIGNIFICANT CHANGE UP (ref 32–36)
MCV RBC AUTO: 90.6 FL — SIGNIFICANT CHANGE UP (ref 80–100)
MONOCYTES # BLD AUTO: 0.65 K/UL — SIGNIFICANT CHANGE UP (ref 0–0.9)
MONOCYTES NFR BLD AUTO: 8.6 % — SIGNIFICANT CHANGE UP (ref 2–14)
NEUTROPHILS # BLD AUTO: 5.32 K/UL — SIGNIFICANT CHANGE UP (ref 1.8–7.4)
NEUTROPHILS NFR BLD AUTO: 70.2 % — SIGNIFICANT CHANGE UP (ref 43–77)
NRBC # FLD: 0 K/UL — SIGNIFICANT CHANGE UP (ref 0–0)
PCO2 BLDV: 56 MMHG — HIGH (ref 41–51)
PH BLDV: 7.39 PH — SIGNIFICANT CHANGE UP (ref 7.32–7.43)
PLATELET # BLD AUTO: 335 K/UL — SIGNIFICANT CHANGE UP (ref 150–400)
PMV BLD: 10.3 FL — SIGNIFICANT CHANGE UP (ref 7–13)
PO2 BLDV: 44 MMHG — HIGH (ref 35–40)
POTASSIUM BLDV-SCNC: 4.2 MMOL/L — SIGNIFICANT CHANGE UP (ref 3.4–4.5)
POTASSIUM SERPL-MCNC: 3.9 MMOL/L — SIGNIFICANT CHANGE UP (ref 3.5–5.3)
POTASSIUM SERPL-SCNC: 3.9 MMOL/L — SIGNIFICANT CHANGE UP (ref 3.5–5.3)
PROT SERPL-MCNC: 8.8 G/DL — HIGH (ref 6–8.3)
PROTHROM AB SERPL-ACNC: 11 SEC — SIGNIFICANT CHANGE UP (ref 9.8–13.1)
RBC # BLD: 3.74 M/UL — LOW (ref 4.2–5.8)
RBC # FLD: 15 % — HIGH (ref 10.3–14.5)
RH IG SCN BLD-IMP: POSITIVE — SIGNIFICANT CHANGE UP
SAO2 % BLDV: 79 % — SIGNIFICANT CHANGE UP (ref 60–85)
SARS-COV-2 RNA SPEC QL NAA+PROBE: SIGNIFICANT CHANGE UP
SODIUM SERPL-SCNC: 131 MMOL/L — LOW (ref 135–145)
WBC # BLD: 7.58 K/UL — SIGNIFICANT CHANGE UP (ref 3.8–10.5)
WBC # FLD AUTO: 7.58 K/UL — SIGNIFICANT CHANGE UP (ref 3.8–10.5)

## 2020-05-13 PROCEDURE — 71045 X-RAY EXAM CHEST 1 VIEW: CPT | Mod: 26

## 2020-05-13 PROCEDURE — 74019 RADEX ABDOMEN 2 VIEWS: CPT | Mod: 26

## 2020-05-13 RX ORDER — CHLORPROMAZINE HCL 10 MG
25 TABLET ORAL ONCE
Refills: 0 | Status: COMPLETED | OUTPATIENT
Start: 2020-05-13 | End: 2020-05-13

## 2020-05-13 RX ORDER — LABETALOL HCL 100 MG
100 TABLET ORAL
Refills: 0 | Status: DISCONTINUED | OUTPATIENT
Start: 2020-05-13 | End: 2020-05-15

## 2020-05-13 RX ORDER — METOCLOPRAMIDE HCL 10 MG
5 TABLET ORAL THREE TIMES A DAY
Refills: 0 | Status: DISCONTINUED | OUTPATIENT
Start: 2020-05-13 | End: 2020-05-15

## 2020-05-13 RX ORDER — NIFEDIPINE 30 MG
60 TABLET, EXTENDED RELEASE 24 HR ORAL AT BEDTIME
Refills: 0 | Status: DISCONTINUED | OUTPATIENT
Start: 2020-05-13 | End: 2020-05-15

## 2020-05-13 RX ORDER — SENNA PLUS 8.6 MG/1
2 TABLET ORAL AT BEDTIME
Refills: 0 | Status: DISCONTINUED | OUTPATIENT
Start: 2020-05-13 | End: 2020-05-15

## 2020-05-13 RX ORDER — ACETAMINOPHEN 500 MG
650 TABLET ORAL EVERY 6 HOURS
Refills: 0 | Status: DISCONTINUED | OUTPATIENT
Start: 2020-05-13 | End: 2020-05-15

## 2020-05-13 RX ORDER — FUROSEMIDE 40 MG
40 TABLET ORAL
Refills: 0 | Status: DISCONTINUED | OUTPATIENT
Start: 2020-05-13 | End: 2020-05-15

## 2020-05-13 RX ORDER — CHLORHEXIDINE GLUCONATE 213 G/1000ML
1 SOLUTION TOPICAL DAILY
Refills: 0 | Status: DISCONTINUED | OUTPATIENT
Start: 2020-05-13 | End: 2020-05-15

## 2020-05-13 RX ORDER — HEPARIN SODIUM 5000 [USP'U]/ML
5000 INJECTION INTRAVENOUS; SUBCUTANEOUS
Refills: 0 | Status: DISCONTINUED | OUTPATIENT
Start: 2020-05-13 | End: 2020-05-15

## 2020-05-13 RX ORDER — ASPIRIN/CALCIUM CARB/MAGNESIUM 324 MG
81 TABLET ORAL DAILY
Refills: 0 | Status: DISCONTINUED | OUTPATIENT
Start: 2020-05-13 | End: 2020-05-15

## 2020-05-13 RX ORDER — ONDANSETRON 8 MG/1
4 TABLET, FILM COATED ORAL ONCE
Refills: 0 | Status: DISCONTINUED | OUTPATIENT
Start: 2020-05-13 | End: 2020-05-13

## 2020-05-13 RX ORDER — POLYETHYLENE GLYCOL 3350 17 G/17G
17 POWDER, FOR SOLUTION ORAL DAILY
Refills: 0 | Status: DISCONTINUED | OUTPATIENT
Start: 2020-05-13 | End: 2020-05-15

## 2020-05-13 RX ORDER — ATORVASTATIN CALCIUM 80 MG/1
40 TABLET, FILM COATED ORAL AT BEDTIME
Refills: 0 | Status: DISCONTINUED | OUTPATIENT
Start: 2020-05-13 | End: 2020-05-15

## 2020-05-13 RX ORDER — CALCIUM ACETATE 667 MG
1334 TABLET ORAL
Refills: 0 | Status: DISCONTINUED | OUTPATIENT
Start: 2020-05-13 | End: 2020-05-15

## 2020-05-13 RX ORDER — LIDOCAINE 4 G/100G
1 CREAM TOPICAL DAILY
Refills: 0 | Status: DISCONTINUED | OUTPATIENT
Start: 2020-05-13 | End: 2020-05-15

## 2020-05-13 RX ADMIN — POLYETHYLENE GLYCOL 3350 17 GRAM(S): 17 POWDER, FOR SOLUTION ORAL at 20:27

## 2020-05-13 RX ADMIN — SENNA PLUS 2 TABLET(S): 8.6 TABLET ORAL at 20:27

## 2020-05-13 RX ADMIN — Medication 25 MILLIGRAM(S): at 06:48

## 2020-05-13 RX ADMIN — Medication 102 MILLIGRAM(S): at 03:56

## 2020-05-13 RX ADMIN — ATORVASTATIN CALCIUM 40 MILLIGRAM(S): 80 TABLET, FILM COATED ORAL at 20:27

## 2020-05-13 RX ADMIN — Medication 60 MILLIGRAM(S): at 20:27

## 2020-05-13 NOTE — CONSULT NOTE ADULT - ASSESSMENT
64 y/o M with hx of DM, HTN, right BKA,, left AKA, ESRD on HD TTS via AVF at [facility] presenting for missed dialysis    ESRD on HD via AVF MWF  Last HD monday  due for hD today  verbal consent obtained  renal diet  monitor electrolytes    Anemia  at goal  no need for epo  monitor hb    HTN  Controlled today   uf with hd  monitor    Hyponatremia  likely sec to increase fluid status in ESRD pt  hD today  expect improvement post hd  monitor    Ckd-mbd  pth, phos are at goal 4/2020  monitor calcium and phos daily    Abd pain  resolved  monitor

## 2020-05-13 NOTE — H&P ADULT - HISTORY OF PRESENT ILLNESS
Patient is a 65 y.o male with PMHx of prior CVA, DM, HTN, right BKA,, left AKA, ESRD on HD M/W/F who resides at [facility] who presents to ED sent by NH for episode of emesis and persistent hiccups. Pt alert and oriented x 3 states starting yesterday (tuesday) he has been having persistent hiccups a/w nausea. Admits had some mild abdominal pain yesterday as well. Then notes episodes of Emesis. As per NH note patient had episode of dark emesis x 1 and concern for GI bleed. Pt on 81 mg aspirin daily. Pt notes some constipation, Last BM was monday, denies passing flatus. Pt also notes feeling sob. Denies cp, pleuritic pain, dizziness, melena, fevers, chills, or any other complaints. Pt previously COVID +.  No hx of abdominal surgeries or prior SBO's

## 2020-05-13 NOTE — ED ADULT NURSE REASSESSMENT NOTE - NS ED NURSE REASSESS COMMENT FT1
Pt awake, refusing to answer question about orientation, when asked about time and place, pt answers "I don't know" or refusing to speak. Covid swab attempted, pt refusing, verbal redirection attempted multiple times, pt starts yelling and refusing. Pt attempted to swab self as per pts request. Will continue to monitor.
Report received from night RN. Pt is resting/sleeping at this time. Able to identify self. Respirations even/unlabored. VSS. 20g IV observed to R ac. IV dry/intact/patent. Skin intact. Has healed ulcers to sacral area. L av fistula observed. + Bruit/thrill. R BKA observed, and L AKA observed. No prosthesis or assistive devices observed at bedside at this time. Pt admitted. Pending bed assignment. Will continue to monitor.

## 2020-05-13 NOTE — CONSULT NOTE ADULT - SUBJECTIVE AND OBJECTIVE BOX
[facility] [place] NEPHROLOGY PRACTICE   MD JEANNETTE CARRION DO ANAM SIDDIQUI ANGELA WONG, PA    TEL:  OFFICE: [phone]  DR ROSENBERG CELL: [phone]  DR. HANSEN CELL: [phone]  DR. MANDEL CELL: [phone]  YONNY WEBB CELL: [phone]    From 5pm-7am answering service [phone]    HPI:  65 y.o male with PMHx of prior CVA, DM, HTN, right BKA, left AKA, ESRD on HD M/W/F who resides at [facility] who presents to ED sent by NH for episode of emesis and persistent hiccups, mild abd pain. pt previously covid +.   pt seen and examined at bedside. currently denied any symptoms, denies cp, pleuritic pain, dizziness, melena, fevers, chills, or any other complaints.    Allergies:  No Known Allergies      PAST MEDICAL & SURGICAL HISTORY:  Kidney disease  HTN (hypertension)  DM (diabetes mellitus)  Wound: (chronic wounds to left foot and leg)  MRSA (methicillin resistant Staphylococcus aureus) colonization: (hx/o MRSA growth from wound culture)  CKD (chronic kidney disease)  Below knee amputation status, right  HTN (hypertension)  DM (diabetes mellitus)  Amputated right leg: below knee  Amputated left leg: above knee  H/O peripheral artery bypass: left fem to below-knee pop with RSVG  S/P BKA (below knee amputation) unilateral, right      Home Medications Reviewed    Hospital Medications:   MEDICATIONS  (STANDING):      SOCIAL HISTORY:  Denies ETOh, Smoking,     FAMILY HISTORY:  Family history of diabetes mellitus      REVIEW OF SYSTEMS:  CONSTITUTIONAL: No weakness, fevers or chills  EYES/ENT: No visual changes;  No vertigo or throat pain   NECK: No pain or stiffness  RESPIRATORY: No cough, wheezing, hemoptysis; No shortness of breath  CARDIOVASCULAR: No chest pain or palpitations.  GASTROINTESTINAL: see hpi  GENITOURINARY: No dysuria, frequency, foamy urine, urinary urgency, incontinence or hematuria  NEUROLOGICAL: No numbness or weakness  SKIN: No itching, burning, rashes, or lesions   VASCULAR: No bilateral lower extremity edema.   All other review of systems is negative unless indicated above.    VITALS:  T(F): 98.2 (05-13-20 @ 11:46), Max: 98.2 (05-13-20 @ 11:46)  HR: 64 (05-13-20 @ 11:46)  BP: 135/63 (05-13-20 @ 11:46)  RR: 17 (05-13-20 @ 11:46)  SpO2: 100% (05-13-20 @ 11:46)  Wt(kg): --      Weight (kg): 69.4 (05-13 @ 11:46)    PHYSICAL EXAM:  Constitutional: NAD  HEENT: anicteric sclera, oropharynx clear, MMM  Neck: No JVD  Respiratory: CTAB, no wheezes, rales or rhonchi  Cardiovascular: S1, S2, RRR  Gastrointestinal: BS+, soft, NT/ND  Extremities: b/l LE amputation   Neurological: A/O x 3, no focal deficits  Psychiatric: Normal mood, normal affect  : No CVA tenderness. No cervantes.   Skin: No rashes  Vascular Access: left avf    LABS:  05-13    131<L>  |  86<L>  |  52<H>  ----------------------------<  118<H>  3.9   |  31  |  4.47<H>    Ca    10.1      13 May 2020 02:50    TPro  8.8<H>  /  Alb  3.9  /  TBili  0.3  /  DBili      /  AST  37  /  ALT  31  /  AlkPhos  117  05-13    Creatinine Trend: 4.47 <--                        10.9   7.58  )-----------( 335      ( 13 May 2020 02:50 )             33.9     Urine Studies:        RADIOLOGY & ADDITIONAL STUDIES:

## 2020-05-13 NOTE — ED ADULT NURSE NOTE - CHIEF COMPLAINT QUOTE
Pt comes in from [facility] for vomiting and hiccups that began about 3 hrs ago. Pt appears in no acute distress, vs as noted and pt denies pain.

## 2020-05-13 NOTE — ED PROVIDER NOTE - OBJECTIVE STATEMENT
HPI: Patient is a 65 y.o male with PMHx of prior CVA, DM, HTN, right BKA,, left AKA, ESRD on HD M/W/F who resides at [facility] who presents to ED sent by NH for episode of emesis and persistent hiccups. Pt alert and oriented x 3 states starting yesterday (tuesday) he has been having persistent hiccups a/w nausea. Admits had some mild abdominal pain yesterday as well. Then notes episodes of Emesis. As per NH note patient had episode of dark emesis x 1 and concern for GI bleed. Pt on 81 mg aspirin daily. Pt notes some constipation, Last BM was monday, denies passing flatus. Pt also notes feeling sob. Denies cp, pleuritic pain, dizziness, melena, fevers, chills, or any other complaints. Pt previously COVID +.  No hx of abdominal surgeries or prior SBO's.

## 2020-05-13 NOTE — ED PROVIDER NOTE - ATTENDING CONTRIBUTION TO CARE
66yo M from NH with PMHx prior CVA, BM, b/l BKAs, ESRD on HD M/W/F, HTN, no prior abdominal surgeries, presenting to ED from NH for 2-3 days of hiccuping with multiple episodes of brownish vomitus.  No bowel movement x 2 days.  NH paperwork endorses 1 episode of "coffeeground emesis", r/o GI Bleed.  Takes daily baby ASA, no other anticoagulants. No fevers, chest pains, and only has abdominal pain upper with hiccuping and vomiting.    General: Patient actively hiccuping with sporadic vomiting, AAO x 3  Skin: Dry and intact  HEENT: Head atraumatic. Oral mucosa moist. No pharyngeal exudates or tonsillar enlargement  Eyes: Conjunctiva normal  Cardiac: Regular rhythm and rate. No pretibial edema b/l  Respiratory: Lungs clear b/l and symmetric. No respiratory distress. Able to speak in complete sentences.  Gastrointestinal: Abdomen soft, nondistended, nontender  Musculoskeletal: Moves all extremities spontaneously  Neurological: alert and oriented to person, place, and time  Psychiatric: Cooperative    EKG unchanged from priors    a/p  n/v possibly triggered by persistent hiccuping  vs pancreatitis/gerd/gastritis/dka  ?upper GIB but vomitus here not coffeeground quality  will get labs, cxr, give IV thorazine for hiccups and will re-eval

## 2020-05-13 NOTE — ED ADULT NURSE NOTE - OBJECTIVE STATEMENT
Break coverage RN: Patient complaining of hiccups and vomiting. Patient vomited multiple times. MD at bedside. Patient has left AV fistula. Bilateral below the knee amputations. No acute respiratory distress. Unable to gain IV access. MD aware and initiated 20 gauge IV right antecubital via ultrasound. Labs drawn and sent. Stretcher in lowest position, wheels locked, side rails up as per protocol. Call bell in reach. Will continue to monitor. Break coverage RN: Patient complaining of hiccups and vomiting. Patient vomited multiple times. MD at bedside. Patient has left AV fistula, thrill felt. Bilateral below the knee amputations. No acute respiratory distress. Unable to gain IV access. MD aware and initiated 20 gauge IV right antecubital via ultrasound. Labs drawn and sent. Stretcher in lowest position, wheels locked, side rails up as per protocol. Call bell in reach. Will continue to monitor.

## 2020-05-13 NOTE — ED PROVIDER NOTE - PROGRESS NOTE DETAILS
Radiology Lt basilar opacity possible atelectasis, large volume of stool. Radiology states xray showing Lt basilar opacity possible atelectasis and large volume of stool. Pt reassessed. Abdomen soft, non-tender. States he is feeling better. Will PO challenge him. CIANO- hiccups improved after meds. abd continues to be soft and nt on exam. will need admission for dialysis

## 2020-05-13 NOTE — ED ADULT TRIAGE NOTE - CHIEF COMPLAINT QUOTE
Pt comes in from [facility] for vomiting and hiccups that began about 3 hrs ago. Pt appears in no acute distress, vs as noted and pt denies pain.

## 2020-05-13 NOTE — ED PROVIDER NOTE - CLINICAL SUMMARY MEDICAL DECISION MAKING FREE TEXT BOX
Patient is a 65 y.o male with PMHx of prior CVA, DM, HTN, right BKA,, left AKA, ESRD on HD M/W/F who resides at [facility] who presents to ED sent by NH for episode of emesis and persistent hiccups. DDx- gastritis, ?sbo, pancreatitis n/v 2/2 hiccups. Plan- labs, cxr, ecg, thorazine. Will monitor and reassess.

## 2020-05-13 NOTE — ED PROVIDER NOTE - PHYSICAL EXAMINATION
Vital signs reviewed.   CONSTITUTIONAL: in no apparent distress. Non-toxic appearing.   HEAD: Normocephalic, atraumatic.  EYES: PERRL, EOM intact, conjunctiva and sclera WNL.  ENT: normal nose; no rhinorrhea; normal pharynx  NECK/LYMPH: Supple; non-tender;   CARD: Normal S1, S2; no murmurs, rubs, or gallops noted.  RESP: Normal chest excursion with respiration; breath sounds clear and equal bilaterally; no wheezes, rhonchi, or rales.  ABD/GI: soft, non-distended; non-tender;   EXT/MS: moves all extremities; L AKA and Rt BKA  SKIN: Normal for age and race;   NEURO: Awake, alert, oriented x 3  PSYCH: Normal mood; appropriate affect.

## 2020-05-14 LAB — SARS-COV-2 RNA SPEC QL NAA+PROBE: SIGNIFICANT CHANGE UP

## 2020-05-14 RX ORDER — LACTULOSE 10 G/15ML
10 SOLUTION ORAL THREE TIMES A DAY
Refills: 0 | Status: DISCONTINUED | OUTPATIENT
Start: 2020-05-14 | End: 2020-05-15

## 2020-05-14 RX ADMIN — Medication 81 MILLIGRAM(S): at 12:34

## 2020-05-14 RX ADMIN — LACTULOSE 10 GRAM(S): 10 SOLUTION ORAL at 21:40

## 2020-05-14 RX ADMIN — POLYETHYLENE GLYCOL 3350 17 GRAM(S): 17 POWDER, FOR SOLUTION ORAL at 12:34

## 2020-05-14 RX ADMIN — Medication 100 MILLIGRAM(S): at 18:02

## 2020-05-14 RX ADMIN — Medication 40 MILLIGRAM(S): at 18:02

## 2020-05-14 RX ADMIN — Medication 1334 MILLIGRAM(S): at 12:34

## 2020-05-14 RX ADMIN — Medication 1 TABLET(S): at 12:34

## 2020-05-14 RX ADMIN — Medication 100 MILLIGRAM(S): at 05:10

## 2020-05-14 RX ADMIN — Medication 1334 MILLIGRAM(S): at 18:03

## 2020-05-14 RX ADMIN — SENNA PLUS 2 TABLET(S): 8.6 TABLET ORAL at 21:40

## 2020-05-14 RX ADMIN — HEPARIN SODIUM 5000 UNIT(S): 5000 INJECTION INTRAVENOUS; SUBCUTANEOUS at 18:02

## 2020-05-14 RX ADMIN — Medication 40 MILLIGRAM(S): at 05:10

## 2020-05-14 RX ADMIN — CHLORHEXIDINE GLUCONATE 1 APPLICATION(S): 213 SOLUTION TOPICAL at 12:01

## 2020-05-14 RX ADMIN — Medication 1334 MILLIGRAM(S): at 09:03

## 2020-05-14 RX ADMIN — LACTULOSE 10 GRAM(S): 10 SOLUTION ORAL at 18:03

## 2020-05-14 RX ADMIN — ATORVASTATIN CALCIUM 40 MILLIGRAM(S): 80 TABLET, FILM COATED ORAL at 21:40

## 2020-05-14 RX ADMIN — Medication 60 MILLIGRAM(S): at 21:40

## 2020-05-14 RX ADMIN — HEPARIN SODIUM 5000 UNIT(S): 5000 INJECTION INTRAVENOUS; SUBCUTANEOUS at 05:10

## 2020-05-15 ENCOUNTER — TRANSCRIPTION ENCOUNTER (OUTPATIENT)
Age: 65
End: 2020-05-15

## 2020-05-15 VITALS — DIASTOLIC BLOOD PRESSURE: 59 MMHG | SYSTOLIC BLOOD PRESSURE: 129 MMHG | HEART RATE: 65 BPM

## 2020-05-15 LAB
ANION GAP SERPL CALC-SCNC: 14 MMO/L — SIGNIFICANT CHANGE UP (ref 7–14)
BUN SERPL-MCNC: 38 MG/DL — HIGH (ref 7–23)
CALCIUM SERPL-MCNC: 9.7 MG/DL — SIGNIFICANT CHANGE UP (ref 8.4–10.5)
CHLORIDE SERPL-SCNC: 91 MMOL/L — LOW (ref 98–107)
CO2 SERPL-SCNC: 28 MMOL/L — SIGNIFICANT CHANGE UP (ref 22–31)
CREAT SERPL-MCNC: 4.44 MG/DL — HIGH (ref 0.5–1.3)
GLUCOSE SERPL-MCNC: 59 MG/DL — LOW (ref 70–99)
HCT VFR BLD CALC: 32.5 % — LOW (ref 39–50)
HGB BLD-MCNC: 10.3 G/DL — LOW (ref 13–17)
MAGNESIUM SERPL-MCNC: 2.4 MG/DL — SIGNIFICANT CHANGE UP (ref 1.6–2.6)
MCHC RBC-ENTMCNC: 29 PG — SIGNIFICANT CHANGE UP (ref 27–34)
MCHC RBC-ENTMCNC: 31.7 % — LOW (ref 32–36)
MCV RBC AUTO: 91.5 FL — SIGNIFICANT CHANGE UP (ref 80–100)
NRBC # FLD: 0 K/UL — SIGNIFICANT CHANGE UP (ref 0–0)
PHOSPHATE SERPL-MCNC: 3.3 MG/DL — SIGNIFICANT CHANGE UP (ref 2.5–4.5)
PLATELET # BLD AUTO: 268 K/UL — SIGNIFICANT CHANGE UP (ref 150–400)
PMV BLD: 10.2 FL — SIGNIFICANT CHANGE UP (ref 7–13)
POTASSIUM SERPL-MCNC: 4.4 MMOL/L — SIGNIFICANT CHANGE UP (ref 3.5–5.3)
POTASSIUM SERPL-SCNC: 4.4 MMOL/L — SIGNIFICANT CHANGE UP (ref 3.5–5.3)
RBC # BLD: 3.55 M/UL — LOW (ref 4.2–5.8)
RBC # FLD: 15.6 % — HIGH (ref 10.3–14.5)
SODIUM SERPL-SCNC: 133 MMOL/L — LOW (ref 135–145)
WBC # BLD: 4.43 K/UL — SIGNIFICANT CHANGE UP (ref 3.8–10.5)
WBC # FLD AUTO: 4.43 K/UL — SIGNIFICANT CHANGE UP (ref 3.8–10.5)

## 2020-05-15 RX ADMIN — Medication 100 MILLIGRAM(S): at 06:04

## 2020-05-15 RX ADMIN — HEPARIN SODIUM 5000 UNIT(S): 5000 INJECTION INTRAVENOUS; SUBCUTANEOUS at 17:46

## 2020-05-15 RX ADMIN — Medication 40 MILLIGRAM(S): at 17:47

## 2020-05-15 RX ADMIN — Medication 1334 MILLIGRAM(S): at 07:49

## 2020-05-15 RX ADMIN — Medication 100 MILLIGRAM(S): at 17:46

## 2020-05-15 RX ADMIN — LACTULOSE 10 GRAM(S): 10 SOLUTION ORAL at 15:16

## 2020-05-15 RX ADMIN — CHLORHEXIDINE GLUCONATE 1 APPLICATION(S): 213 SOLUTION TOPICAL at 11:49

## 2020-05-15 RX ADMIN — HEPARIN SODIUM 5000 UNIT(S): 5000 INJECTION INTRAVENOUS; SUBCUTANEOUS at 06:04

## 2020-05-15 RX ADMIN — LACTULOSE 10 GRAM(S): 10 SOLUTION ORAL at 06:04

## 2020-05-15 RX ADMIN — POLYETHYLENE GLYCOL 3350 17 GRAM(S): 17 POWDER, FOR SOLUTION ORAL at 15:16

## 2020-05-15 RX ADMIN — Medication 1334 MILLIGRAM(S): at 17:31

## 2020-05-15 RX ADMIN — Medication 81 MILLIGRAM(S): at 15:16

## 2020-05-15 RX ADMIN — Medication 40 MILLIGRAM(S): at 06:04

## 2020-05-15 RX ADMIN — Medication 1 TABLET(S): at 15:16

## 2020-05-15 NOTE — PROGRESS NOTE ADULT - PROBLEM SELECTOR PLAN 1
From constipation  Constipation has improved
From constipation  Needs laxatives, Bisacodyl ordered, may need lactulose

## 2020-05-15 NOTE — DISCHARGE NOTE NURSING/CASE MANAGEMENT/SOCIAL WORK - PATIENT PORTAL LINK FT
You can access the FollowMyHealth Patient Portal offered by [facility] by registering at the following website: [email or web address]. By joining [facility]’s FollowMyHealth portal, you will also be able to view your health information using other applications (apps) compatible with our system.

## 2020-05-15 NOTE — PROGRESS NOTE ADULT - SUBJECTIVE AND OBJECTIVE BOX
[facility] [place] [place] [facility]PHROLOGY PRACTICE   MD JEANNETTE CARRION DO ANAM SIDDIQUI ANGELA WONG, PA    TEL:  OFFICE: [phone]  DR ROSENBERG CELL: [phone]  DR. HANSEN CELL: [phone]  DR. MANDEL CELL: [phone]  YONNY WEBB CELL: [phone]    From 5pm-7am Answering Service [phone]    Patient is a 65y old  Male who presents with a chief complaint of Vomiting (13 May 2020 13:53)      Patient seen and examined at bedside. No chest pain/sob    VITALS:  T(F): 98.5 (05-14-20 @ 10:30), Max: 98.5 (05-14-20 @ 10:30)  HR: 70 (05-14-20 @ 10:30)  BP: 127/55 (05-14-20 @ 10:30)  RR: 18 (05-14-20 @ 10:30)  SpO2: 98% (05-14-20 @ 10:30)  Wt(kg): --    05-13 @ 07:01  -  05-14 @ 07:00  --------------------------------------------------------  IN: 500 mL / OUT: 2200 mL / NET: -1700 mL          PHYSICAL EXAM:  Constitutional: NAD  Neck: No JVD  Respiratory: CTAB, no wheezes, rales or rhonchi  Cardiovascular: S1, S2, RRR  Gastrointestinal: BS+, soft, NT/ND  Extremities: b/l amputation     Hospital Medications:   MEDICATIONS  (STANDING):  aspirin enteric coated 81 milliGRAM(s) Oral daily  atorvastatin 40 milliGRAM(s) Oral at bedtime  calcium acetate 1334 milliGRAM(s) Oral three times a day with meals  chlorhexidine 4% Liquid 1 Application(s) Topical daily  furosemide    Tablet 40 milliGRAM(s) Oral two times a day  heparin   Injectable 5000 Unit(s) SubCutaneous two times a day  labetalol 100 milliGRAM(s) Oral two times a day  lactulose Syrup 10 Gram(s) Oral three times a day  multivitamin 1 Tablet(s) Oral daily  NIFEdipine XL 60 milliGRAM(s) Oral at bedtime  polyethylene glycol 3350 17 Gram(s) Oral daily  senna 2 Tablet(s) Oral at bedtime      LABS:  05-13    131<L>  |  86<L>  |  52<H>  ----------------------------<  118<H>  3.9   |  31  |  4.47<H>    Ca    10.1      13 May 2020 02:50    TPro  8.8<H>  /  Alb  3.9  /  TBili  0.3  /  DBili      /  AST  37  /  ALT  31  /  AlkPhos  117  05-13    Creatinine Trend: 4.47 <--                                10.9   7.58  )-----------( 335      ( 13 May 2020 02:50 )             33.9     Urine Studies:      .3 (Ca --)      [04-01-20 @ 11:03]   --  HbA1c 4.2      [07-19-19 @ 09:56]    HBsAg NEGATIVE      [05-13-20 @ 16:20]      RADIOLOGY & ADDITIONAL STUDIES:
[facility] [place]   MD JEANNETTE CARRION DO ANAM SIDDIQUI ANGELA WONG, PA    TEL:  OFFICE: [phone]  DR ROSENBERG CELL: [phone]  DR. HANSEN CELL: [phone]  DR. MANDEL CELL: [phone]  YONNY WEBB CELL: [phone]    From 5pm-7am Answering Service [phone]    Patient is a 65y old  Male who presents with a chief complaint of Vomiting (15 May 2020 08:21)      Patient seen and examined in HD No chest pain/sob    VITALS:  T(F): 97.9 (05-15-20 @ 11:10), Max: 97.9 (05-15-20 @ 06:03)  HR: 67 (05-15-20 @ 11:10)  BP: 130/64 (05-15-20 @ 11:10)  RR: 17 (05-15-20 @ 11:10)  SpO2: 98% (05-15-20 @ 10:03)  Wt(kg): --  flow 400        PHYSICAL EXAM:  Constitutional: NAD  Neck: No JVD  Respiratory: CTAB, no wheezes, rales or rhonchi  Cardiovascular: S1, S2, RRR  Gastrointestinal: BS+, soft, NT/ND  Extremities: b/l [facility] Medications:   MEDICATIONS  (STANDING):  aspirin enteric coated 81 milliGRAM(s) Oral daily  atorvastatin 40 milliGRAM(s) Oral at bedtime  calcium acetate 1334 milliGRAM(s) Oral three times a day with meals  chlorhexidine 4% Liquid 1 Application(s) Topical daily  furosemide    Tablet 40 milliGRAM(s) Oral two times a day  heparin   Injectable 5000 Unit(s) SubCutaneous two times a day  labetalol 100 milliGRAM(s) Oral two times a day  lactulose Syrup 10 Gram(s) Oral three times a day  multivitamin 1 Tablet(s) Oral daily  NIFEdipine XL 60 milliGRAM(s) Oral at bedtime  polyethylene glycol 3350 17 Gram(s) Oral daily  senna 2 Tablet(s) Oral at bedtime      LABS:  05-15    133<L>  |  91<L>  |  38<H>  ----------------------------<  59<L>  4.4   |  28  |  4.44<H>    Ca    9.7      15 May 2020 06:00  Phos  3.3     05-15  Mg     2.4     05-15      Creatinine Trend: 4.44 <--, 4.47 <--    Phosphorus Level, Serum: 3.3 mg/dL (05-15 @ 06:00)                              10.3   4.43  )-----------( 268      ( 15 May 2020 06:00 )             32.5     Urine Studies:      .3 (Ca --)      [04-01-20 @ 11:03]   --  HbA1c 4.2      [07-19-19 @ 09:56]    HBsAg NEGATIVE      [05-13-20 @ 16:20]      RADIOLOGY & ADDITIONAL STUDIES:
Patient is a 65y old  Male who presents with a chief complaint of Vomiting (14 May 2020 12:59)      SUBJECTIVE / OVERNIGHT EVENTS:  No new symptoms  Review of Systems:   CONSTITUTIONAL: No fever, weight loss, or fatigue  EYES: No eye pain, visual disturbances, or discharge  ENMT:  No difficulty hearing, tinnitus, vertigo; No sinus or throat pain  NECK: No pain or stiffness  BREASTS: No pain, masses, or nipple discharge  RESPIRATORY: No cough, wheezing, chills or hemoptysis; No shortness of breath  CARDIOVASCULAR: No chest pain, palpitations, dizziness, or leg swelling  GASTROINTESTINAL: No abdominal or epigastric pain. No nausea, vomiting, or hematemesis; No melena or hematochezia.  GENITOURINARY: No dysuria, frequency, hematuria, or incontinence  NEUROLOGICAL: No headaches, memory loss, loss of strength, numbness, or tremors  SKIN: No itching, burning, rashes, or lesions   LYMPH NODES: No enlarged glands  ENDOCRINE: No heat or cold intolerance; No hair loss  MUSCULOSKELETAL: No joint pain or swelling; No muscle, back, or extremity pain  PSYCHIATRIC: No depression, anxiety, mood swings, or difficulty sleeping  HEME/LYMPH: No easy bruising, or bleeding gums  ALLERY AND IMMUNOLOGIC: No hives or eczema    MEDICATIONS  (STANDING):  aspirin enteric coated 81 milliGRAM(s) Oral daily  atorvastatin 40 milliGRAM(s) Oral at bedtime  calcium acetate 1334 milliGRAM(s) Oral three times a day with meals  chlorhexidine 4% Liquid 1 Application(s) Topical daily  furosemide    Tablet 40 milliGRAM(s) Oral two times a day  heparin   Injectable 5000 Unit(s) SubCutaneous two times a day  labetalol 100 milliGRAM(s) Oral two times a day  lactulose Syrup 10 Gram(s) Oral three times a day  multivitamin 1 Tablet(s) Oral daily  NIFEdipine XL 60 milliGRAM(s) Oral at bedtime  polyethylene glycol 3350 17 Gram(s) Oral daily  senna 2 Tablet(s) Oral at bedtime    MEDICATIONS  (PRN):  acetaminophen   Tablet .. 650 milliGRAM(s) Oral every 6 hours PRN Temp greater or equal to 38.5C (101.3F), Moderate Pain (4 - 6)  bisacodyl 5 milliGRAM(s) Oral every 12 hours PRN Constipation  lidocaine   Patch 1 Patch Transdermal daily PRN back pain  metoclopramide 5 milliGRAM(s) Oral three times a day PRN nausea or vomiting      PHYSICAL EXAM:  Vital Signs Last 24 Hrs  T(C): 37.1 (14 May 2020 13:12), Max: 37.1 (14 May 2020 13:12)  T(F): 98.8 (14 May 2020 13:12), Max: 98.8 (14 May 2020 13:12)  HR: 68 (14 May 2020 13:12) (68 - 73)  BP: 122/57 (14 May 2020 13:12) (122/57 - 147/64)  BP(mean): --  RR: 18 (14 May 2020 13:12) (17 - 18)  SpO2: 99% (14 May 2020 13:12) (98% - 100%)  I&O's Summary    13 May 2020 07:01  -  14 May 2020 07:00  --------------------------------------------------------  IN: 500 mL / OUT: 2200 mL / NET: -1700 mL      GENERAL: NAD, well-developed  HEAD:  Atraumatic, Normocephalic  EYES: EOMI, PERRLA, conjunctiva and sclera clear  NECK: Supple, No JVD  CHEST/LUNG: Clear to auscultation bilaterally; No wheeze  HEART: Regular rate and rhythm; No murmurs, rubs, or gallops  ABDOMEN: Soft, Nontender, Nondistended; Bowel sounds present  EXTREMITIES:  2+ Peripheral Pulses, No clubbing, cyanosis, or edema  PSYCH: AAOx3  NEUROLOGY: non-focal  SKIN: No rashes or lesions    LABS:  CAPILLARY BLOOD GLUCOSE                              10.9   7.58  )-----------( 335      ( 13 May 2020 02:50 )             33.9     05-13    131<L>  |  86<L>  |  52<H>  ----------------------------<  118<H>  3.9   |  31  |  4.47<H>    Ca    10.1      13 May 2020 02:50    TPro  8.8<H>  /  Alb  3.9  /  TBili  0.3  /  DBili  x   /  AST  37  /  ALT  31  /  AlkPhos  117  05-13    PT/INR - ( 13 May 2020 02:50 )   PT: 11.0 SEC;   INR: 0.96          PTT - ( 13 May 2020 02:50 )  PTT:32.4 SEC          RADIOLOGY & ADDITIONAL TESTS:    Imaging Personally Reviewed:    Consultant(s) Notes Reviewed:      Care Discussed with Consultants/Other Providers:
Patient is a 65y old  Male who presents with a chief complaint of Vomiting (14 May 2020 12:59)      SUBJECTIVE / OVERNIGHT EVENTS:  No new symptoms  Review of Systems:   CONSTITUTIONAL: No fever, weight loss, or fatigue  EYES: No eye pain, visual disturbances, or discharge  ENMT:  No difficulty hearing, tinnitus, vertigo; No sinus or throat pain  NECK: No pain or stiffness  BREASTS: No pain, masses, or nipple discharge  RESPIRATORY: No cough, wheezing, chills or hemoptysis; No shortness of breath  CARDIOVASCULAR: No chest pain, palpitations, dizziness, or leg swelling  GASTROINTESTINAL: No abdominal or epigastric pain. No nausea, vomiting, or hematemesis; No melena or hematochezia.  GENITOURINARY: No dysuria, frequency, hematuria, or incontinence  NEUROLOGICAL: No headaches, memory loss, loss of strength, numbness, or tremors  SKIN: No itching, burning, rashes, or lesions   LYMPH NODES: No enlarged glands  ENDOCRINE: No heat or cold intolerance; No hair loss  MUSCULOSKELETAL: No joint pain or swelling; No muscle, back, or extremity pain  PSYCHIATRIC: No depression, anxiety, mood swings, or difficulty sleeping  HEME/LYMPH: No easy bruising, or bleeding gums  ALLERY AND IMMUNOLOGIC: No hives or eczema    MEDICATIONS  (STANDING):  aspirin enteric coated 81 milliGRAM(s) Oral daily  atorvastatin 40 milliGRAM(s) Oral at bedtime  calcium acetate 1334 milliGRAM(s) Oral three times a day with meals  chlorhexidine 4% Liquid 1 Application(s) Topical daily  furosemide    Tablet 40 milliGRAM(s) Oral two times a day  heparin   Injectable 5000 Unit(s) SubCutaneous two times a day  labetalol 100 milliGRAM(s) Oral two times a day  lactulose Syrup 10 Gram(s) Oral three times a day  multivitamin 1 Tablet(s) Oral daily  NIFEdipine XL 60 milliGRAM(s) Oral at bedtime  polyethylene glycol 3350 17 Gram(s) Oral daily  senna 2 Tablet(s) Oral at bedtime    MEDICATIONS  (PRN):  acetaminophen   Tablet .. 650 milliGRAM(s) Oral every 6 hours PRN Temp greater or equal to 38.5C (101.3F), Moderate Pain (4 - 6)  bisacodyl 5 milliGRAM(s) Oral every 12 hours PRN Constipation  lidocaine   Patch 1 Patch Transdermal daily PRN back pain  metoclopramide 5 milliGRAM(s) Oral three times a day PRN nausea or vomiting      PHYSICAL EXAM:  Vital Signs Last 24 Hrs  T(C): 37.1 (14 May 2020 13:12), Max: 37.1 (14 May 2020 13:12)  T(F): 98.8 (14 May 2020 13:12), Max: 98.8 (14 May 2020 13:12)  HR: 68 (14 May 2020 13:12) (68 - 73)  BP: 122/57 (14 May 2020 13:12) (122/57 - 147/64)  BP(mean): --  RR: 18 (14 May 2020 13:12) (17 - 18)  SpO2: 99% (14 May 2020 13:12) (98% - 100%)  I&O's Summary    13 May 2020 07:01  -  14 May 2020 07:00  --------------------------------------------------------  IN: 500 mL / OUT: 2200 mL / NET: -1700 mL      GENERAL: NAD, well-developed  HEAD:  Atraumatic, Normocephalic  EYES: EOMI, PERRLA, conjunctiva and sclera clear  NECK: Supple, No JVD  CHEST/LUNG: Clear to auscultation bilaterally; No wheeze  HEART: Regular rate and rhythm; No murmurs, rubs, or gallops  ABDOMEN: Soft, Nontender, Nondistended; Bowel sounds present  EXTREMITIES:  2+ Peripheral Pulses, No clubbing, cyanosis, or edema  PSYCH: AAOx3  NEUROLOGY: non-focal  SKIN: No rashes or lesions    LABS:  CAPILLARY BLOOD GLUCOSE                              10.9   7.58  )-----------( 335      ( 13 May 2020 02:50 )             33.9     05-13    131<L>  |  86<L>  |  52<H>  ----------------------------<  118<H>  3.9   |  31  |  4.47<H>    Ca    10.1      13 May 2020 02:50    TPro  8.8<H>  /  Alb  3.9  /  TBili  0.3  /  DBili  x   /  AST  37  /  ALT  31  /  AlkPhos  117  05-13    PT/INR - ( 13 May 2020 02:50 )   PT: 11.0 SEC;   INR: 0.96          PTT - ( 13 May 2020 02:50 )  PTT:32.4 SEC          RADIOLOGY & ADDITIONAL TESTS:    Imaging Personally Reviewed:    Consultant(s) Notes Reviewed:      Care Discussed with Consultants/Other Providers:

## 2020-05-15 NOTE — DISCHARGE NOTE PROVIDER - HOSPITAL COURSE
Patient is a 65 y.o male with PMHx of prior CVA, DM, HTN, right BKA,, left AKA, ESRD on HD M/W/F who resides at [facility] who presents to ED sent by NH for episode of emesis and persistent hiccups. Pt alert and oriented x 3 states starting yesterday (tuesday) he has been having persistent hiccups a/w nausea. Admits had some mild abdominal pain yesterday as well. Then notes episodes of Emesis. As per NH note patient had episode of dark emesis x 1 and concern for GI bleed. Pt on 81 mg aspirin daily. Pt notes some constipation, Last BM was monday, denies passing flatus. Pt also notes feeling sob. Denies cp, pleuritic pain, dizziness, melena, fevers, chills, or any other complaints. Pt previously COVID +.  No hx of abdominal surgeries or prior SBO's         On admission, AXR showed nonobstructive bowel gas pattern without evidence of free air and large volume of stool throughout the colon which is consistent with constipation. Patient placed on miralax, senna, lactulose, and dulcolax. Patient placed on CLD. CXR showed small left pleural effusion with adjacent atelectasis. Patient was given thorazine and reglan prn. Episodes of vomiting resided. Patient was tested for COVID and was negative x 2. Patient had HD while inpatient. Patient is stable and ready for discharge.        On ___ this case was reviewed with  ____, the patient is medically stable and optimized for discharge. All medications were reviewed and prescriptions were sent to mutually agreed upon pharmacy. Patient is a 65 y.o male with PMHx of prior CVA, DM, HTN, right BKA,, left AKA, ESRD on HD M/W/F who resides at [facility] who presents to ED sent by NH for episode of emesis and persistent hiccups. Pt alert and oriented x 3 states starting yesterday (tuesday) he has been having persistent hiccups a/w nausea. Admits had some mild abdominal pain yesterday as well. Then notes episodes of Emesis. As per NH note patient had episode of dark emesis x 1 and concern for GI bleed. Pt on 81 mg aspirin daily. Pt notes some constipation, Last BM was monday, denies passing flatus. Pt also notes feeling sob. Denies cp, pleuritic pain, dizziness, melena, fevers, chills, or any other complaints. Pt previously COVID +.  No hx of abdominal surgeries or prior SBO's         On admission, AXR showed nonobstructive bowel gas pattern without evidence of free air and large volume of stool throughout the colon which is consistent with constipation. Patient placed on miralax, senna, lactulose, and dulcolax. Patient placed on CLD. CXR showed small left pleural effusion with adjacent atelectasis. Patient was given thorazine and reglan prn. Episodes of vomiting resided. Patient was tested for COVID and was negative x 2. Patient had HD while inpatient. Patient is stable and ready for discharge.        On 5/15/2020 this case was reviewed with Dr. Mac, the patient is medically stable and optimized for discharge. All medications were reviewed and prescriptions were sent to mutually agreed upon pharmacy.

## 2020-05-15 NOTE — DISCHARGE NOTE PROVIDER - PROVIDER TOKENS
FREE:[LAST:[Your PCP],PHONE:[(   )    -],FAX:[(   )    -]],FREE:[LAST:[Your nephrologist],PHONE:[(   )    -],FAX:[(   )    -]]

## 2020-05-15 NOTE — DISCHARGE NOTE PROVIDER - NSDCCPCAREPLAN_GEN_ALL_CORE_FT
PRINCIPAL DISCHARGE DIAGNOSIS  Diagnosis: Intractable vomiting  Assessment and Plan of Treatment: You were admitted for vomiting. Abdominal x ray showed no obstruction, but a large amount of stool which is consistent with constipation. You were placed on miralax, senna, lactulose, and dulcolax. You were placed on clear liquid diet and transitioned to regular diet. You were given thorazine and reglan as needed for nausea and vomiting. Your episodes of vomiting resided. You were tested for COVID and were negative x 2. Please follow up with your primary care physician after discharge for continued monitoring and management.      SECONDARY DISCHARGE DIAGNOSES  Diagnosis: ESRD (end stage renal disease)  Assessment and Plan of Treatment: Please continue to follow your dialysis schedule and refer to your primary provider/nephrologist for further care/recommendations. Continue your medications and supplementation as directed.    Diagnosis: Controlled type 2 diabetes mellitus without complication, unspecified whether long term insulin use  Assessment and Plan of Treatment: Continue consistent carbohydrate diet.  Monitor blood glucose levels throughout the day before meals and at bedtime.  Record blood sugars and bring to outpatient providers appointment in order to be reviewed by your doctor for management modifications.  Be aware of diabetes management symptoms including feeling cool and clammy may be related to low glucose levels.  Feeling hot and dry may indicate high glucose levels.  If  you feel these symptoms, check your blood sugar.  Make regular podiatry appointments in order to have feet checked for wounds and toe nails cut by a doctor to prevent infections.

## 2020-05-15 NOTE — PROGRESS NOTE ADULT - PROBLEM SELECTOR PROBLEM 4
Controlled type 2 diabetes mellitus without complication, unspecified whether long term insulin use
Controlled type 2 diabetes mellitus without complication, unspecified whether long term insulin use

## 2020-05-15 NOTE — DISCHARGE NOTE PROVIDER - NSDCFUADDAPPT_GEN_ALL_CORE_FT
Please follow up with your primary care physician and nephrologist after discharge for continued monitoring and management.

## 2020-05-15 NOTE — DISCHARGE NOTE PROVIDER - CARE PROVIDER_API CALL
Your PCP,   Phone: (   )    -  Fax: (   )    -  Follow Up Time:     Your nephrologist,   Phone: (   )    -  Fax: (   )    -  Follow Up Time:

## 2020-05-15 NOTE — DISCHARGE NOTE PROVIDER - NSDCMRMEDTOKEN_GEN_ALL_CORE_FT
acetaminophen 325 mg oral tablet: 2 tab(s) orally every 6 hours, As needed, Moderate Pain (4 - 6)  aspirin 81 mg oral delayed release tablet: 1 tab(s) orally once a day  atorvastatin 40 mg oral tablet: 1 tab(s) orally once a day  calcium acetate 667 mg oral tablet: 3 tab(s) orally 3 times a day  furosemide 40 mg oral tablet: 1 tab(s) orally 2 times a day  Glutose 15 oral gel: 15 gram(s) orally 3 times a day  labetalol 100 mg oral tablet: 1 tab(s) orally 2 times a day  lactobacillus acidophilus oral capsule:  orally   lactulose 10 g/15 mL oral syrup: 15 milliliter(s) orally once a day (at bedtime)  lidocaine 5% topical film: Apply topically to affected area on back once a day, As Needed  Multiple Vitamins oral tablet: 1 tab(s) orally once a day  NIFEdipine 60 mg oral tablet, extended release: 2 tab(s) orally once a day (at bedtime)  oxycodone-acetaminophen 5 mg-325 mg oral tablet: 1 tab(s) orally every 4 hours, As needed, Severe Pain (7 - 10)  senna oral tablet: 2 tab(s) orally once a day (at bedtime)

## 2020-05-15 NOTE — PROGRESS NOTE ADULT - ASSESSMENT
64 y/o M with hx of DM, HTN, right BKA,, left AKA, ESRD on HD TTS via AVF at [facility] presenting for missed dialysis    ESRD on HD via AVF MWF  s/p HD 5/13 tolerated well  verbal consent obtained  will continue MWF schedule  d/c planning to QV dialysis MWF  renal diet  monitor electrolytes    stable from renal for discharge     Anemia  at goal  no need for epo  monitor hb    HTN  Controlled today   uf with hd  monitor    Hyponatremia  likely sec to increase fluid status in ESRD pt  s/p HD  expect improvement post hd  monitor    Ckd-mbd  pth, phos are at goal 4/2020  monitor calcium and phos daily    Abd pain  resolved  monitor
66 y/o M with hx of DM, HTN, right BKA,, left AKA, ESRD on HD TTS via AVF at [facility] presenting for missed dialysis    ESRD on HD via AVF MWF  s/p HD 5/13 tolerated well  verbal consent obtained  continue HD today, UF reduced to 0.5L sec to hypotension. currently vitals stable. access working well  d/c planning to QV dialysis MWF  renal diet  monitor electrolytes    stable from renal for discharge     Anemia  at goal  no need for epo  monitor hb    HTN  Controlled today   uf with hd  monitor    Hyponatremia  likely sec to increase fluid status in ESRD pt  s/p HD  expect improvement post hd  monitor    Ckd-mbd  pth, phos are at goal 4/2020  monitor calcium and phos daily    Abd pain  resolved  monitor

## 2020-07-31 NOTE — PROGRESS NOTE ADULT - PROBLEM SELECTOR PLAN 1
as above Go for blood tests as directed. Your doctor will do lab tests at regular visits to monitor the effects of this medicine. Please follow up with your doctor and keep your health care provider appointments.

## 2020-09-22 NOTE — ED PROVIDER NOTE - CROS ED NEURO ALL NEG
HPI:  Jennifer Pedroza is a 70y.o. year old female who is here for a routine visit:    Presents for a follow-up visit. She was seen here last week with epigastric and right flank discomfort. She had a CT scan in the ER revealing some omental fat stranding. Lipase had been elevated. Last week we repeated the lipase and it was normal.  I ordered an MRCP which revealed no significant abnormalities. She took a course of Flagyl but developed GI upset before the completion and stopped it. She continues to have epigastric pain that radiates around to the right back. Seems to be very positional.  The pain increases with standing or walking. She does not have any pain that radiates down the legs. She does have a longstanding history of lower back issues previously requiring injections. She is doing physical therapy right now for her right shoulder and that does seem to be helping. She is able to get relief from this flank pain by lying on her side but that makes her shoulder hurt. She denies any relationship to meals. No nausea vomiting. No melena or hematochezia. I reviewed the CT scan, lab work, and MRI scan with her in detail today including the reports.       Past Medical History:   Diagnosis Date    Arthritis     Cancer ([address]     skin    Chronic pain     back - PT    Diabetes ([address]     PRE DIABETIC    Hypercholesterolemia     Hypertension     Ill-defined condition     N/V    Ill-defined condition     right eye scheduled for cataract removed 2017    Thyroid disease        Past Surgical History:   Procedure Laterality Date    BREAST SURGERY PROCEDURE UNLISTED  ,3/2007,     breast biopsy - all benign    HX CATARACT REMOVAL Bilateral 2018    HX  SECTION      HX COLONOSCOPY  16    Dr. Milner  HX HEENT Bilateral 2020    EYE LIFT    HX HEENT  2020    eyelid surgery     HX HEENT      oral sx     HX HYSTERECTOMY      HX LAP CHOLECYSTECTOMY  2017 [place]    HX MALIGNANT SKIN LESION EXCISION  2012    skin cancer from nose - BCCA per pt    HX ORTHOPAEDIC Left 11/11/2019    Ankle srugery    HX ORTHOPAEDIC Right 06/04/2020    Dr. Calvin Hammans       Prior to Admission medications    Medication Sig Start Date End Date Taking? Authorizing Provider   gabapentin (NEURONTIN) 100 mg capsule Take 1 Cap by mouth three (3) times daily. Max Daily Amount: 300 mg. 9/22/20  Yes Thad Juárez MD   rOPINIRole (REQUIP) 1 mg tablet Take 1 tablet by mouth twice daily 9/21/20  Yes Altagracia Robledo NP   triamcinolone acetonide (KENALOG) 0.1 % topical cream Apply  to affected area two (2) times a day. use thin layer 9/15/20  Yes Thad Juárez MD   levothyroxine (SYNTHROID) 112 mcg tablet TAKE 1 TABLET BY MOUTH BEFORE BREAKFAST . APPOINTMENT REQUIRED FOR FUTURE REFILLS 9/9/20  Yes Thad Juáerz MD   chlorthalidone (HYGROTEN) 50 mg tablet Take 1 tablet by mouth once daily 9/9/20  Yes Thad Juárez MD   conjugated estrogens (Premarin) 0.625 mg tablet Take 1 tablet by mouth once daily 9/8/20  Yes Thad Juárez MD   metFORMIN ER (GLUCOPHAGE XR) 500 mg tablet TAKE 1 TABLET BY MOUTH ONCE DAILY WITH SUPPER 9/8/20  Yes Thad Juárez MD   fenofibrate nanocrystallized (TRICOR) 145 mg tablet Take 1 tablet by mouth once daily 8/25/20  Yes Thad Juárez MD   cloNIDine HCL (CATAPRES) 0.1 mg tablet Take 1 Tab by mouth two (2) times a day. 2/9/20  Yes Thad Juárez MD   ketoconazole (NIZORAL) 2 % topical cream  4/16/18  Yes Provider, Historical   Cholecalciferol, Vitamin D3, (VITAMIN D3) 2,000 unit cap capsule Take 2,000 Units by mouth daily. 4/16/18  Yes Thad Juárez MD   vitamin E (AQUA GEMS) 400 unit capsule Take 400 Units by mouth daily. Yes Provider, Historical   GLUCOSAMINE/CHONDROITIN SULF A (GLUCOSAMINE-CHONDROITIN PO) Take  by mouth. 1500mg/1200mg per 2 pills.  Takes one pill once daily. Yes Provider, Historical   loratadine (ALLERCLEAR) 10 mg tablet Take 10 mg by mouth daily as needed for Allergies. Yes Provider, Historical   metroNIDAZOLE (FlagyL) 500 mg tablet Take 1 Tab by mouth three (3) times daily for 10 days.  9/14/20 9/22/20  Judy Banegas MD       Social History     Socioeconomic History    Marital status:      Spouse name: Not on file    Number of children: Not on file    Years of education: Not on file    Highest education level: Not on file   Occupational History    Not on file   Social Needs    Financial resource strain: Not on file    Food insecurity     Worry: Not on file     Inability: Not on file    Transportation needs     Medical: Not on file     Non-medical: Not on file   Tobacco Use    Smoking status: Never Smoker    Smokeless tobacco: Never Used   Substance and Sexual Activity    Alcohol use: Not Currently     Comment: maybe once a year - has a sip    Drug use: No    Sexual activity: Yes     Partners: Male   Lifestyle    Physical activity     Days per week: Not on file     Minutes per session: Not on file    Stress: Not on file   Relationships    Social connections     Talks on phone: Not on file     Gets together: Not on file     Attends Worship service: Not on file     Active member of club or organization: Not on file     Attends meetings of clubs or organizations: Not on file     Relationship status: Not on file    Intimate partner violence     Fear of current or ex partner: Not on file     Emotionally abused: Not on file     Physically abused: Not on file     Forced sexual activity: Not on file   Other Topics Concern    Not on file   Social History Narrative    Not on file          ROS  Per HPI    Visit Vitals  BP (!) 144/83   Pulse 74   Temp 97.3 °F (36.3 °C) (Temporal)   Resp 12   Ht 5' 1\" (1.549 m)   Wt 166 lb 9.6 oz (75.6 kg)   SpO2 97%   BMI 31.48 kg/m²         Physical Exam   Physical Examination: General appearance - alert, well appearing, and in no distress  Chest - clear to auscultation, no wheezes, rales or rhonchi, symmetric air entry  Heart - normal rate, regular rhythm, normal S1, S2, no murmurs, rubs, clicks or gallops  Abdomen - tenderness noted mildly in the epigastrium and right upper quadrant. no rebound tenderness noted  bowel sounds normal  Back exam -there is tenderness across the thoracic spine and just to the right of the spinous processes. No redness. No swelling. No rash. Neurological - alert, oriented, normal speech, no focal findings or movement disorder noted  Musculoskeletal - no joint tenderness, deformity or swelling  Extremities - peripheral pulses normal, no pedal edema, no clubbing or cyanosis      Assessment/Plan:  Diagnoses and all orders for this visit:    1. Thoracic radiculopathy likely the cause for her discomfort. Will try gabapentin and see if that is helpful. We will also review the scan again with the radiologist to read her MRI and see if it helpful. We will ask him to assess for causes of thoracic radiculopathy.  -     gabapentin (NEURONTIN) 100 mg capsule; Take 1 Cap by mouth three (3) times daily. Max Daily Amount: 300 mg.    2. Encounter for immunization  -     FLU (FLUAD QUAD INFLUENZA VACCINE,QUAD,ADJUVANTED)    3. Neuropathic pain of both feet    4. Chronic bilateral low back pain, unspecified whether sciatica presentstable after previous injections. 5. Shoulder arthritiscontinue physical therapy for now. Advised her to call back or return to office if symptoms worsen/change/persist.  Discussed expected course/resolution/complications of diagnosis in detail with patient. Medication risks/benefits/costs/interactions/alternatives discussed with patient. She was given an after visit summary which includes diagnoses, current medications, & vitals. She expressed understanding with the diagnosis and plan. negative...

## 2020-09-23 NOTE — PROGRESS NOTE ADULT - PROBLEM SELECTOR PROBLEM 2
Acidosis Pt presents with SOB since last night while laying in bed.  Pt states recently had angiogram and Echo which revealed "EF 30%".  PT denies swelling to LE, VSS.  Denies cough, chest pain or SOB.  Will continue to monitor

## 2020-10-05 NOTE — PROGRESS NOTE ADULT - SUBJECTIVE AND OBJECTIVE BOX
[Normal Breath Sounds] : Normal breath sounds [Normal Heart Sounds] : normal heart sounds [Normal Rate and Rhythm] : normal rate and rhythm [No Edema] : No edema [No Rash or Lesion] : No rash or lesion [Alert] : alert [Oriented to Person] : oriented to person [Oriented to Place] : oriented to place [Oriented to Time] : oriented to time [Calm] : calm [Abdomen Masses] : No abdominal masses [No HSM] : no hepatosplenomegaly [Exam Deferred] : exam was deferred [JVD] : no jugular venous distention  [Thyroid] : the thyroid was abnormal [Wheezing] : no wheezing was heard [de-identified] : Mild left lower quadrant tenderness without peritoneal signs. [de-identified] : Appears well nourished, in no acute distress [de-identified] : WNL [de-identified] : Full ROM Progress Note    JAMES PATRICK 63y (1955) Male [id number]  02-12-18 (3d)    Dr. Galvez, [facility] PGY1  Pager# [phone]    Subjective:  No acute events overnight. Patient seen and examined at bedside. Patient denies any pain; states that foot pain is getting better. Denied any fevers, shortness of breath, cough, nausea, vomiting, abdominal pain. However at around noon, patient started experiencing chills, reported cough for 2 days, but no shortness of breath. Pulse ox was 88%. temp 100.7.    Revised:  CONSTITUTIONAL: +fever, +chills.  EYES: No eye pain, visual disturbances, or discharge  ENMT:  No difficulty hearing, tinnitus, vertigo; No sinus or throat pain  NECK: No pain or stiffness  RESPIRATORY: +cough. No sob.  CARDIOVASCULAR: No chest pain, palpitations, dizziness, or leg swelling  GASTROINTESTINAL: No abdominal or epigastric pain. No nausea, vomiting, or hematemesis; No diarrhea or constipation. No melena or hematochezia.  GENITOURINARY: No dysuria, frequency, hematuria, or incontinence  NEUROLOGICAL: No headaches, memory loss, loss of strength, numbness, or tremors  SKIN: No itching, burning, rashes, or lesions   MUSCULOSKELETAL: +foot pain improved from yesterday  PAST MEDICAL & SURGICAL HISTORY:  Kidney disease (N28.9)  HTN (hypertension) (I10)  DM (diabetes mellitus) (E11.9)  S/P BKA (below knee amputation) unilateral, right (Z89.511)  No significant past surgical history ([id number])    acetaminophen   Tablet 650 milliGRAM(s) Oral every 6 hours PRN  acetaminophen   Tablet. 650 milliGRAM(s) Oral every 6 hours PRN  atorvastatin 40 milliGRAM(s) Oral at bedtime  cyanocobalamin 1000 MICROGram(s) Oral daily  dextrose 5% + sodium chloride 0.45%. 1000 milliLiter(s) IV Continuous <Continuous>  dextrose 5%. 1000 milliLiter(s) IV Continuous <Continuous>  dextrose 50% Injectable 12.5 Gram(s) IV Push once  dextrose 50% Injectable 25 Gram(s) IV Push once  dextrose 50% Injectable 25 Gram(s) IV Push once  dextrose Gel 1 Dose(s) Oral once PRN  glucagon  Injectable 1 milliGRAM(s) IntraMuscular once PRN  heparin  Injectable 5000 Unit(s) SubCutaneous every 8 hours  influenza   Vaccine 0.5 milliLiter(s) IntraMuscular once  insulin lispro (HumaLOG) corrective regimen sliding scale   SubCutaneous three times a day before meals  labetalol 100 milliGRAM(s) Oral three times a day  lactobacillus acidophilus 1 Tablet(s) Oral daily  NIFEdipine XL 30 milliGRAM(s) Oral daily  piperacillin/tazobactam IVPB. 3.375 Gram(s) IV Intermittent every 12 hours  silver sulfADIAZINE 1% Cream 1 Application(s) Topical daily  sodium bicarbonate 650 milliGRAM(s) Oral two times a day  vancomycin  IVPB 1000 milliGRAM(s) IV Intermittent once    Objective:  T(C): 38.2 (02-15-18 @ 12:14), Max: 38.2 (02-15-18 @ 12:14)  HR: 89 (02-15-18 @ 12:14) (73 - 89)  BP: 145/73 (02-15-18 @ 12:14) (137/58 - 150/67)  RR: 20 (02-15-18 @ 12:14) (18 - 20)  SpO2: 94% (02-15-18 @ 12:14) (92% - 94%)    GENERAL: NAD  HEAD:  Atraumatic, Normocephalic  EYES: EOMI, conjunctiva and sclera clear  NECK: Supple, No JVD  CHEST/LUNG: +scattered rhonchi and decr breath sounds in RLL  HEART: Regular rate and rhythm; No murmurs, rubs, or gallops  ABDOMEN: Soft, Nontender, Nondistended; Bowel sounds present  EXTREMITIES: LLE: pitting edema improving but still present. Foot was freshly wrapped, but per podiatry note improving. 0  PSYCH: AAOx3      CAPILLARY BLOOD GLUCOSE      (02-15 @ 07:20)                      7.8  7.83 )-----------( 187                 24.7    Neutrophils = 5.33 (68.0%)  Lymphocytes = 1.22 (15.6%)  Eosinophils = 0.17 (2.2%)  Basophils = 0.04 (0.5%)  Monocytes = 1.03 (13.2%)  Bands = --%    02-15    145  |  111<H>  |  58<H>  ----------------------------<  85  5.0   |  17<L>  |  4.11<H>    Ca    8.4      15 Feb 2018 07:20    TPro  7.0  /  Alb  3.3  /  TBili  0.3  /  DBili  x   /  AST  22  /  ALT  25  /  AlkPhos  90  02-15    WBC Trend: 7.83<--, 9.20<--, 10.54<--    Hb Trend: 7.8<--, 8.1<--, 7.5<--, 9.6<--        New imaging in last 24 hours: N/A  Consult notes reviewed: Vascular, podiatry, ID

## 2020-11-16 NOTE — ED ADULT NURSE NOTE - CHIEF COMPLAINT
Final Anesthesia Post-op Assessment    Patient: Victoria Shaw  Procedure(s) Performed: COLONOSCOPY  Anesthesia type: No value filed.    Vitals Value Taken Time   Temp 36 °C (96.8 °F) 11/16/20 1141   Pulse 71 11/16/20 1141   Resp 20 11/16/20 1141   SpO2 98 % 11/16/20 1141   /59 11/16/20 1141         Patient Location: PACU Phase 1  Post-op Vital Signs:stable  Level of Consciousness: awake, alert and participates in exam  Respiratory Status: spontaneous ventilation  Cardiovascular stable  Hydration: euvolemic  Pain Management: adequately controlled  Handoff: Handoff to receiving nurse was performed and questions were answered  Vomiting: none   Nausea: None  Airway Patency:patent  Post-op Assessment: awake, alert, appropriately conversant, or baseline, no complications and patient tolerated procedure well with no complications      No complications documented.    The patient is a 65y Male complaining of vomiting.

## 2021-02-20 ENCOUNTER — INPATIENT (INPATIENT)
Facility: HOSPITAL | Age: 66
LOS: 5 days | Discharge: NURSING, REHAB OR LONG-TERM CARE | End: 2021-02-26
Attending: INTERNAL MEDICINE
Payer: MEDICAID

## 2021-02-20 VITALS
HEART RATE: 65 BPM | OXYGEN SATURATION: 100 % | TEMPERATURE: 98 F | HEIGHT: 78 IN | DIASTOLIC BLOOD PRESSURE: 67 MMHG | RESPIRATION RATE: 18 BRPM | SYSTOLIC BLOOD PRESSURE: 157 MMHG

## 2021-02-20 DIAGNOSIS — Z89.611 ACQUIRED ABSENCE OF RIGHT LEG ABOVE KNEE: Chronic | ICD-10-CM

## 2021-02-20 DIAGNOSIS — I63.9 CEREBRAL INFARCTION, UNSPECIFIED: ICD-10-CM

## 2021-02-20 DIAGNOSIS — Z98.890 OTHER SPECIFIED POSTPROCEDURAL STATES: Chronic | ICD-10-CM

## 2021-02-20 DIAGNOSIS — R11.10 VOMITING, UNSPECIFIED: ICD-10-CM

## 2021-02-20 DIAGNOSIS — N18.6 END STAGE RENAL DISEASE: ICD-10-CM

## 2021-02-20 DIAGNOSIS — Z89.612 ACQUIRED ABSENCE OF LEFT LEG ABOVE KNEE: Chronic | ICD-10-CM

## 2021-02-20 DIAGNOSIS — Z29.9 ENCOUNTER FOR PROPHYLACTIC MEASURES, UNSPECIFIED: ICD-10-CM

## 2021-02-20 DIAGNOSIS — I10 ESSENTIAL (PRIMARY) HYPERTENSION: ICD-10-CM

## 2021-02-20 DIAGNOSIS — H40.9 UNSPECIFIED GLAUCOMA: ICD-10-CM

## 2021-02-20 DIAGNOSIS — Z89.511 ACQUIRED ABSENCE OF RIGHT LEG BELOW KNEE: Chronic | ICD-10-CM

## 2021-02-20 DIAGNOSIS — E11.49 TYPE 2 DIABETES MELLITUS WITH OTHER DIABETIC NEUROLOGICAL COMPLICATION: ICD-10-CM

## 2021-02-20 LAB
ALBUMIN SERPL ELPH-MCNC: 3.9 G/DL — SIGNIFICANT CHANGE UP (ref 3.3–5)
ALP SERPL-CCNC: 125 U/L — HIGH (ref 40–120)
ALT FLD-CCNC: 28 U/L — SIGNIFICANT CHANGE UP (ref 4–41)
ANION GAP SERPL CALC-SCNC: 13 MMOL/L — SIGNIFICANT CHANGE UP (ref 7–14)
APTT BLD: 21.1 SEC — LOW (ref 27–36.3)
AST SERPL-CCNC: 21 U/L — SIGNIFICANT CHANGE UP (ref 4–40)
BASOPHILS # BLD AUTO: 0.05 K/UL — SIGNIFICANT CHANGE UP (ref 0–0.2)
BASOPHILS NFR BLD AUTO: 1 % — SIGNIFICANT CHANGE UP (ref 0–2)
BILIRUB SERPL-MCNC: 0.2 MG/DL — SIGNIFICANT CHANGE UP (ref 0.2–1.2)
BLOOD GAS VENOUS COMPREHENSIVE RESULT: SIGNIFICANT CHANGE UP
BUN SERPL-MCNC: 37 MG/DL — HIGH (ref 7–23)
CALCIUM SERPL-MCNC: 9.3 MG/DL — SIGNIFICANT CHANGE UP (ref 8.4–10.5)
CHLORIDE SERPL-SCNC: 94 MMOL/L — LOW (ref 98–107)
CO2 SERPL-SCNC: 29 MMOL/L — SIGNIFICANT CHANGE UP (ref 22–31)
CREAT SERPL-MCNC: 4.2 MG/DL — HIGH (ref 0.5–1.3)
EOSINOPHIL # BLD AUTO: 0.48 K/UL — SIGNIFICANT CHANGE UP (ref 0–0.5)
EOSINOPHIL NFR BLD AUTO: 9.3 % — HIGH (ref 0–6)
GLUCOSE SERPL-MCNC: 67 MG/DL — LOW (ref 70–99)
HCT VFR BLD CALC: 39.2 % — SIGNIFICANT CHANGE UP (ref 39–50)
HGB BLD-MCNC: 12.2 G/DL — LOW (ref 13–17)
IANC: 3.05 K/UL — SIGNIFICANT CHANGE UP (ref 1.5–8.5)
IMM GRANULOCYTES NFR BLD AUTO: 0.4 % — SIGNIFICANT CHANGE UP (ref 0–1.5)
INR BLD: 0.98 RATIO — SIGNIFICANT CHANGE UP (ref 0.88–1.16)
LYMPHOCYTES # BLD AUTO: 1.05 K/UL — SIGNIFICANT CHANGE UP (ref 1–3.3)
LYMPHOCYTES # BLD AUTO: 20.4 % — SIGNIFICANT CHANGE UP (ref 13–44)
MCHC RBC-ENTMCNC: 28 PG — SIGNIFICANT CHANGE UP (ref 27–34)
MCHC RBC-ENTMCNC: 31.1 GM/DL — LOW (ref 32–36)
MCV RBC AUTO: 89.9 FL — SIGNIFICANT CHANGE UP (ref 80–100)
MONOCYTES # BLD AUTO: 0.5 K/UL — SIGNIFICANT CHANGE UP (ref 0–0.9)
MONOCYTES NFR BLD AUTO: 9.7 % — SIGNIFICANT CHANGE UP (ref 2–14)
NEUTROPHILS # BLD AUTO: 3.05 K/UL — SIGNIFICANT CHANGE UP (ref 1.8–7.4)
NEUTROPHILS NFR BLD AUTO: 59.2 % — SIGNIFICANT CHANGE UP (ref 43–77)
NRBC # BLD: 0 /100 WBCS — SIGNIFICANT CHANGE UP
NRBC # FLD: 0 K/UL — SIGNIFICANT CHANGE UP
PLATELET # BLD AUTO: 214 K/UL — SIGNIFICANT CHANGE UP (ref 150–400)
POTASSIUM SERPL-MCNC: 4.3 MMOL/L — SIGNIFICANT CHANGE UP (ref 3.5–5.3)
POTASSIUM SERPL-SCNC: 4.3 MMOL/L — SIGNIFICANT CHANGE UP (ref 3.5–5.3)
PROT SERPL-MCNC: 7.9 G/DL — SIGNIFICANT CHANGE UP (ref 6–8.3)
PROTHROM AB SERPL-ACNC: 11.2 SEC — SIGNIFICANT CHANGE UP (ref 10.6–13.6)
RBC # BLD: 4.36 M/UL — SIGNIFICANT CHANGE UP (ref 4.2–5.8)
RBC # FLD: 15.5 % — HIGH (ref 10.3–14.5)
SODIUM SERPL-SCNC: 136 MMOL/L — SIGNIFICANT CHANGE UP (ref 135–145)
WBC # BLD: 5.15 K/UL — SIGNIFICANT CHANGE UP (ref 3.8–10.5)
WBC # FLD AUTO: 5.15 K/UL — SIGNIFICANT CHANGE UP (ref 3.8–10.5)

## 2021-02-20 PROCEDURE — 70450 CT HEAD/BRAIN W/O DYE: CPT | Mod: 26

## 2021-02-20 PROCEDURE — 99291 CRITICAL CARE FIRST HOUR: CPT

## 2021-02-20 PROCEDURE — 99222 1ST HOSP IP/OBS MODERATE 55: CPT

## 2021-02-20 RX ORDER — HEPARIN SODIUM 5000 [USP'U]/ML
5000 INJECTION INTRAVENOUS; SUBCUTANEOUS EVERY 12 HOURS
Refills: 0 | Status: DISCONTINUED | OUTPATIENT
Start: 2021-02-20 | End: 2021-02-26

## 2021-02-20 RX ORDER — INSULIN LISPRO 100/ML
VIAL (ML) SUBCUTANEOUS
Refills: 0 | Status: DISCONTINUED | OUTPATIENT
Start: 2021-02-20 | End: 2021-02-26

## 2021-02-20 RX ORDER — ATORVASTATIN CALCIUM 80 MG/1
80 TABLET, FILM COATED ORAL AT BEDTIME
Refills: 0 | Status: DISCONTINUED | OUTPATIENT
Start: 2021-02-20 | End: 2021-02-26

## 2021-02-20 RX ORDER — PREDNISOLONE SODIUM PHOSPHATE 1 %
1 DROPS OPHTHALMIC (EYE)
Refills: 0 | Status: DISCONTINUED | OUTPATIENT
Start: 2021-02-20 | End: 2021-02-26

## 2021-02-20 RX ORDER — DEXTROSE 50 % IN WATER 50 %
12.5 SYRINGE (ML) INTRAVENOUS ONCE
Refills: 0 | Status: DISCONTINUED | OUTPATIENT
Start: 2021-02-20 | End: 2021-02-26

## 2021-02-20 RX ORDER — SODIUM CHLORIDE 9 MG/ML
1000 INJECTION, SOLUTION INTRAVENOUS
Refills: 0 | Status: DISCONTINUED | OUTPATIENT
Start: 2021-02-20 | End: 2021-02-26

## 2021-02-20 RX ORDER — GLUCAGON INJECTION, SOLUTION 0.5 MG/.1ML
1 INJECTION, SOLUTION SUBCUTANEOUS ONCE
Refills: 0 | Status: DISCONTINUED | OUTPATIENT
Start: 2021-02-20 | End: 2021-02-26

## 2021-02-20 RX ORDER — LACTULOSE 10 G/15ML
20 SOLUTION ORAL AT BEDTIME
Refills: 0 | Status: DISCONTINUED | OUTPATIENT
Start: 2021-02-20 | End: 2021-02-26

## 2021-02-20 RX ORDER — DEXTROSE 50 % IN WATER 50 %
15 SYRINGE (ML) INTRAVENOUS ONCE
Refills: 0 | Status: DISCONTINUED | OUTPATIENT
Start: 2021-02-20 | End: 2021-02-26

## 2021-02-20 RX ORDER — LATANOPROST 0.05 MG/ML
1 SOLUTION/ DROPS OPHTHALMIC; TOPICAL AT BEDTIME
Refills: 0 | Status: DISCONTINUED | OUTPATIENT
Start: 2021-02-20 | End: 2021-02-26

## 2021-02-20 RX ORDER — DEXTROSE 50 % IN WATER 50 %
25 SYRINGE (ML) INTRAVENOUS ONCE
Refills: 0 | Status: DISCONTINUED | OUTPATIENT
Start: 2021-02-20 | End: 2021-02-26

## 2021-02-20 RX ORDER — ASPIRIN/CALCIUM CARB/MAGNESIUM 324 MG
81 TABLET ORAL DAILY
Refills: 0 | Status: DISCONTINUED | OUTPATIENT
Start: 2021-02-20 | End: 2021-02-26

## 2021-02-20 RX ORDER — SENNA PLUS 8.6 MG/1
2 TABLET ORAL AT BEDTIME
Refills: 0 | Status: DISCONTINUED | OUTPATIENT
Start: 2021-02-20 | End: 2021-02-26

## 2021-02-20 RX ORDER — CALCIUM ACETATE 667 MG
667 TABLET ORAL
Refills: 0 | Status: DISCONTINUED | OUTPATIENT
Start: 2021-02-20 | End: 2021-02-26

## 2021-02-20 RX ORDER — ACETAMINOPHEN 500 MG
650 TABLET ORAL EVERY 6 HOURS
Refills: 0 | Status: DISCONTINUED | OUTPATIENT
Start: 2021-02-20 | End: 2021-02-26

## 2021-02-20 RX ADMIN — LACTULOSE 20 GRAM(S): 10 SOLUTION ORAL at 23:38

## 2021-02-20 RX ADMIN — ATORVASTATIN CALCIUM 80 MILLIGRAM(S): 80 TABLET, FILM COATED ORAL at 23:38

## 2021-02-20 RX ADMIN — SENNA PLUS 2 TABLET(S): 8.6 TABLET ORAL at 23:38

## 2021-02-20 RX ADMIN — LATANOPROST 1 DROP(S): 0.05 SOLUTION/ DROPS OPHTHALMIC; TOPICAL at 23:39

## 2021-02-20 NOTE — ED ADULT NURSE NOTE - CHIEF COMPLAINT QUOTE
Pt arrives via EMS from [facility] for L side weakness beginning at approx 1:30PM. Denies headache, n/v, blurry vision. Charge RN made aware, Code stroke activated and pt brought directly to CT.  PMHx DM, ESRD, cerebral infart in past

## 2021-02-20 NOTE — ED ADULT NURSE NOTE - INTERVENTIONS DEFINITIONS
Call bell, personal items and telephone within reach/Instruct patient to call for assistance/Non-slip footwear when patient is off stretcher/Physically safe environment: no spills, clutter or unnecessary equipment/Stretcher in lowest position, wheels locked, appropriate side rails in place

## 2021-02-20 NOTE — ED PROVIDER NOTE - NS ED ROS FT
REVIEW OF SYSTEMS:    CONSTITUTIONAL: No weakness, fevers or chills  EYES: No vision changes  ENT: No vertigo or throat pain   NECK: No pain or stiffness  RESPIRATORY: No cough, wheezing, hemoptysis; No shortness of breath  CARDIOVASCULAR: No chest pain or palpitations  GASTROINTESTINAL: No abdominal or epigastric pain. No nausea, vomiting, or hematemesis; No diarrhea or constipation. No melena or hematochezia.  GENITOURINARY: No dysuria, frequency or hematuria  NEUROLOGICAL: Weakness in LUE  PSYCH: No anxiety, depression, or behavior changes  All other review of systems is negative unless indicated above.

## 2021-02-20 NOTE — ED ADULT NURSE REASSESSMENT NOTE - NS ED NURSE REASSESS COMMENT FT1
pts FS noted to be 78. pt AAO x 3. pt appears in NAD at this time. pt given 8oz of orange juice.
report received from MARCO Alcala, pt A&Ox3, appears comfortable and in no distress at this time. respirations even and non labored. meal provided. VS and neuro check as noted.

## 2021-02-20 NOTE — ED PROVIDER NOTE - PHYSICAL EXAMINATION
Attending/Octavio: NAD, PERRl, supple, RRR, +SM; CTAB, Abd-soft, NT/ND, RT BKA, LT AKA, decrease lt hand  strength, LE stumps motor/sensory intact; +dysmetric

## 2021-02-20 NOTE — STROKE CODE NOTE - CT READING
B/L occipital lobe chronic infarcts, chronic B/L cerebellar infarcts R>L.  No acute infarct/early changes/Other

## 2021-02-20 NOTE — H&P ADULT - NSHPLABSRESULTS_GEN_ALL_CORE
12.2   5.15  )-----------( 214      ( 20 Feb 2021 16:46 )             39.2       02-20    136  |  94<L>  |  37<H>  ----------------------------<  67<L>  4.3   |  29  |  4.20<H>    Ca    9.3      20 Feb 2021 18:07    TPro  7.9  /  Alb  3.9  /  TBili  0.2  /  DBili  x   /  AST  21  /  ALT  28  /  AlkPhos  125<H>  02-20                  PT/INR - ( 20 Feb 2021 16:46 )   PT: 11.2 sec;   INR: 0.98 ratio         PTT - ( 20 Feb 2021 16:46 )  PTT:21.1 sec    Lactate Trend            CAPILLARY BLOOD GLUCOSE  81 (20 Feb 2021 18:19)      POCT Blood Glucose.: 78 mg/dL (20 Feb 2021 17:38)\    CT head-  There is no acute intrarenal hemorrhage or acute territorial infarction.    EKG-sinus lianet

## 2021-02-20 NOTE — ED ADULT TRIAGE NOTE - CHIEF COMPLAINT QUOTE
Pt arrives via EMS from [facility], [place], operated by [facility] for L side weakness beginning at approx 1:30PM. Denies headache, n/v, blurry vision. Charge RN made aware, Code stroke activated and pt brought directly to CT.  PMHx DM, ESRD, cerebral infart in past

## 2021-02-20 NOTE — ED ADULT NURSE NOTE - OBJECTIVE STATEMENT
pt received in rm 11 AAO x 3 as code stroke, no TPA. pt with h/o CVA, HD on M/W/F. last HD tx yesterday, receiving full treatment. pt states after HD last night pt noted to have left arm weakness and numbness. pt reports he relied message to NH and was sent to ED for further evaluation. pt denies sob, chest pain, n/v/d, fevers, chills, cough, vision changes, h/a. respirations even and unlabored. pt noted to have AV fistula to left arm, left AKA, right BKA, healed pressure ulcers to sacrum. no facial drooping or drooling noted. decreased strength and sensation noted to left upper extremity compared to baseline. NSR on monitor. 20g iv placed to right ac. will continue to monitor.

## 2021-02-20 NOTE — CONSULT NOTE ADULT - ASSESSMENT
INCOMPLETE  Assessment:  66y R-handed M with h/o ESRD on HD (M/W/F), HTN, DM, MRSA colonization, R. BKA, L. AKA and multiple chronic embolic infarcts (B/L occipital, B/L cerebellar, L. temporal) with residual visual impairment p/w acute onset left hypoesthetic hemiparesis after most recent HD session, improving but not back to baseline.   PT is unclear of his meds but notes he is inconsistently compliant with ASA 81.     LKW: 21:00 21  NIHSS: 7   Baseline MRS: 4 (Unable to ambulate at all due to B/L leg amputations   Not a tPA candidate due to being outside of window.   Not a thrombectomy candidate due to severe poor baseline, improving exam, minor new symptoms with NIHSS of 7 being higher than expected 2* chronic residual symptoms.     CT Brain Stroke Protocol (21 @ 16:20) B/L occipital infarcts w/ encephalomalacia & gliosis a/w ex vacuo dilation of occipital horns of LV, chronic B/L cerebellar infarcts R>L and now new large areas of hypodensity, masses or hemorrhage.  To my eye, chronic infarcts appear remarkably symmetric and embolic restricted to posterior circulation territories.     IMPRESSION: Acute onset LUE hypoesthetic hemiparesis due to HD related acute effect vs. new R. thalamocapsular/ R. cortical infarct suspected mechanism ESUS vs. ?.  Additionally, PT with chronic B/L severe visual deficits, B/L hemianopsia due to B/L chronic occipital infarcts and B/L ataxia on FTN either due to visual impairment induced balint syndrome vs. B/L chronic cerebellar infarcts.  Though chronic infarcts appear embolic suggesting ESUS mechanism, given that all of them are in posterior circulation suspect ICAD vs. artery to artery embolism as mechanisms.     PLAN:  Stroke acute management:  - Frequent neuro-checks (q4h) VS q4h  - Permissive HTN up to 220/110 for 24-48h from symptom onset (Until 21:00 21 with gradual normotension therafter).   - Dysphagia screen.  If passes, start on diabetic dysphagia diet and advance as tolerated.   - Reach out to [facility] to verify meds   - Admit to medicine on 4S with telemetry.   - STAT CT head non-contrast for change in neuro exam.     Secondary prevention of stroke:  -Aspirin 81mg daily for now.  If embolic source determined will consider AC.  If ICAD mechanism of acute and prior strokes will consider adding Plavix 75 daily x 3 months.   -Atorvastatin 80 mg daily (long-term goal LDL < 70)  -Tight glucose control (long-term goal HgbA1c < 6%)  -Stroke education and counseling    Stroke workup:  -CTA not done due severe ESRD with dialysis just done today. PT not being thrombectomy candidate and acute changes improving.  PT however does need vessel imaging ASAP.    ---->If can get done in expedited way and no contraindications, then would do MRA Head w/o contrast and MRA neck w/wo contrast.   ---->Otherwise, would do CTA H&N with planned HD coordinated w/nephrology given PT has multiple posterior circulation infarcts and only on intermittent ASA   -MRI brain w/o contrast if no contraindications.  Otherwise, repeat CT head in 24 hrs.   -TTE w/ bubble study, continuous cardiac telemetry to monitor for arrhythmia  -Stroke labwork: HgbA1C, fasting lipid panel, CBC, CMP, coag panel, troponin  -Baseline EKG and CXR  -If vessel imaging does not demonstrate significant ICAD in posterior circulation then PT will need ILR or 30 day holter monitoring.     Stroke rehabilitation:  -Physical therapy, occupational therapy, speech therapy consults    Prophylaxis: Lovenox 40 mg Sq daily unless contraindicated in which case SCDs     Reynold Otoole, DO  PGY-3 Neurology Service 66y R-handed M with h/o ESRD on HD (M/W/F), HTN, DM, MRSA colonization, R. BKA, L. AKA and multiple chronic embolic infarcts (B/L occipital, B/L cerebellar, L. temporal) with residual visual impairment p/w acute onset left hypoesthetic hemiparesis after most recent HD session, improving but not back to baseline.   PT is unclear of his meds but notes he is inconsistently compliant with ASA 81.     LKW: 21:00 21  NIHSS: 7   Baseline MRS: 4 (Unable to ambulate at all due to B/L leg amputations   Not a tPA candidate due to being outside of window.   Not a thrombectomy candidate due to severe poor baseline, improving exam, minor new symptoms with NIHSS of 7 being higher than expected 2* chronic residual symptoms.     CT Brain Stroke Protocol (21 @ 16:20) B/L occipital infarcts w/ encephalomalacia & gliosis a/w ex vacuo dilation of occipital horns of LV, chronic B/L cerebellar infarcts R>L and now new large areas of hypodensity, masses or hemorrhage.  To my eye, chronic infarcts appear remarkably symmetric and embolic restricted to posterior circulation territories.     IMPRESSION: Acute onset LUE hypoesthetic hemiparesis due to HD related acute effect vs. new R. thalamocapsular/ R. cortical infarct suspected mechanism ESUS vs. ?.  Additionally, PT with chronic B/L severe visual deficits, B/L hemianopsia due to B/L chronic occipital infarcts and B/L ataxia on FTN either due to visual impairment induced balint syndrome vs. B/L chronic cerebellar infarcts.  Though chronic infarcts appear embolic suggesting ESUS mechanism, given that all of them are in posterior circulation suspect ICAD vs. artery to artery embolism as mechanisms.     PLAN:  Stroke acute management:  - Frequent neuro-checks (q4h) VS q4h  - Permissive HTN up to 220/110 for 24-48h from symptom onset (Until 21:00 21 with gradual normotension therafter).   - Dysphagia screen.  If passes, start on diabetic dysphagia diet and advance as tolerated.   - Reach out to [facility] to verify meds   - Admit to medicine on 4S with telemetry.   - STAT CT head non-contrast for change in neuro exam.     Secondary prevention of stroke:  -Aspirin 81mg daily and Plavix x 75 daily for 3 months with Plavix 300mg LD now.  Though suspect embolic source vs. posterior circulation ICAD, given so many strokes in posterior circulation will start Rx empirically for now. If embolic source determined will consider AC.  If ICAD mechanism of acute and prior strokes will contine Plavix 75 daily x 3 months.   -Atorvastatin 80 mg daily (long-term goal LDL < 70)  -Tight glucose control (long-term goal HgbA1c < 6%)  -Stroke education and counseling    Stroke workup:  -CTA not done due severe ESRD with dialysis just done today. PT not being thrombectomy candidate and acute changes improving.  PT however does need vessel imaging ASAP.    ---->If can get done in expedited way and no contraindications, then would do MRA Head w/o contrast and MRA neck w/wo contrast.   ---->Otherwise, would do CTA H&N with planned HD coordinated w/nephrology given PT has multiple posterior circulation infarcts and only on intermittent ASA   -MRI brain w/o contrast if no contraindications.  Otherwise, repeat CT head in 24 hrs.   -TTE w/ bubble study, continuous cardiac telemetry to monitor for arrhythmia  -Stroke labwork: HgbA1C, fasting lipid panel, CBC, CMP, coag panel, troponin  -Baseline EKG and CXR  -If vessel imaging does not demonstrate significant ICAD in posterior circulation then PT will need ILR or 30 day holter monitoring.     Stroke rehabilitation:  -Physical therapy, occupational therapy, speech therapy consults    Prophylaxis: Lovenox 40 mg Sq daily unless contraindicated in which case Oscar Otoole, DO  PGY-3 Neurology Service

## 2021-02-20 NOTE — H&P ADULT - HISTORY OF PRESENT ILLNESS
66yr old male presented to lt hand weakness.  Pt states that he noticed weakness on his left hand last night,but didnt tell anyone. He told the people at the rehab todAY so,he was sent to the ED.  His lt hand weakness is same as  yesterday.  denies loss of sensation.No slurry speech,blurry vision,double vision,facial droop  or weakness of other part of the body.  Denies headache,nausea.vomiting.

## 2021-02-20 NOTE — CONSULT NOTE ADULT - SUBJECTIVE AND OBJECTIVE BOX
NEUROLOGY CONSULT   HPI: 66y R-handed M with h/o ESRD on HD (M/W/F), HTN, DM, MRSA colonization, R. BKA, L. AKA and multiple chronic embolic infarcts (B/L occipital, B/L cerebellar, L. temporal) with residual visual impairment p/w acute onset left hypoesthetic hemiparesis after most recent HD session, improving but not back to baseline.   PT is unclear of his meds but notes he is inconsistently compliant with ASA 81.  At baseline, PT is non-ambulatory 2* B/L amputations and lives at [facility]  States LKN of 21:00 02/19/21.  PT woke up with reduced sensation in L.LE which he had in past, but after HD on 02/19 began having L. arm involvement which was new.  PT poor historian due to inattention.      ROS: A 10-system ROS was performed and is negative except for those items noted above.     PMH:  ESRD on HD MWF  HTN (hypertension)  DM (diabetes mellitus)  MRSA (methicillin resistant Staphylococcus aureus) colonization  Below knee amputation status, right  L. AKA  B/L cerebellar and B/L occipital chronic infarcts.     Home Medications:  acetaminophen 325 mg oral tablet: 2 tab(s) orally every 6 hours, As needed, Moderate Pain (4 - 6) (05 Apr 2018 10:37)  aspirin 81 mg oral delayed release tablet: 1 tab(s) orally once a day (05 Apr 2018 10:37)  atorvastatin 40 mg oral tablet: 1 tab(s) orally once a day (12 Feb 2018 18:15)  calcium acetate 667 mg oral tablet: 1 tab(s) orally 3 times a day (20 Feb 2021 19:30)  furosemide 40 mg oral tablet: 1 tab(s) orally 2 times a day (11 May 2018 16:49)  labetalol 200 mg oral tablet: 1 tab(s) orally 3 times a day(6am, 2pm,10pm) (20 Feb 2021 19:29)  lactulose 10 g/15 mL oral syrup: 30 milliliter(s) orally once a day (at bedtime) (20 Feb 2021 19:32)  latanoprost 0.005% ophthalmic solution: 1 drop(s) to each affected eye once a day (in the evening) (20 Feb 2021 19:30)  Pred Forte 1% ophthalmic suspension: 1 drop(s) to each lt eye 2 times a day (20 Feb 2021 19:31)  Procardia XL 60 mg oral tablet, extended release: 1 tab(s) orally once a day at bedtime (20 Feb 2021 19:29)  senna oral tablet: 2 tab(s) orally once a day (at bedtime) (11 May 2018 16:49)    MEDICATIONS  (STANDING):  aspirin enteric coated 81 milliGRAM(s) Oral daily  atorvastatin 80 milliGRAM(s) Oral at bedtime  calcium acetate 667 milliGRAM(s) Oral three times a day with meals  dextrose 40% Gel 15 Gram(s) Oral once  dextrose 5%. 1000 milliLiter(s) (50 mL/Hr) IV Continuous <Continuous>  dextrose 5%. 1000 milliLiter(s) (100 mL/Hr) IV Continuous <Continuous>  dextrose 50% Injectable 25 Gram(s) IV Push once  dextrose 50% Injectable 12.5 Gram(s) IV Push once  dextrose 50% Injectable 25 Gram(s) IV Push once  glucagon  Injectable 1 milliGRAM(s) IntraMuscular once  heparin   Injectable 5000 Unit(s) SubCutaneous every 12 hours  insulin lispro (ADMELOG) corrective regimen sliding scale   SubCutaneous three times a day before meals  lactulose Syrup 20 Gram(s) Oral at bedtime  latanoprost 0.005% Ophthalmic Solution 1 Drop(s) Both EYES at bedtime  prednisoLONE acetate 1% Suspension 1 Drop(s) Left EYE two times a day  senna 2 Tablet(s) Oral at bedtime    MEDICATIONS  (PRN):  acetaminophen   Tablet .. 650 milliGRAM(s) Oral every 6 hours PRN Temp greater or equal to 38C (100.4F), Mild Pain (1 - 3)    ALLERGIES: No Known Allergies      PSH:   R. BKA  L. AKA  H/O peripheral artery bypass    Social History: Former smoker, no current tobacco, alcohol or drug use.     FH: DM, HTN    OBJECTIVE  Vital Signs Last 24 Hrs  T(C): 36.6 (21 Feb 2021 04:21), Max: 36.8 (20 Feb 2021 16:03)  T(F): 97.8 (21 Feb 2021 04:21), Max: 98.2 (20 Feb 2021 16:03)  HR: 60 (21 Feb 2021 08:20) (58 - 73)  BP: 181/74 (21 Feb 2021 08:20) (157/67 - 206/68)  BP(mean): --  RR: 18 (21 Feb 2021 04:21) (14 - 18)  SpO2: 98% (21 Feb 2021 04:21) (98% - 100%)  I&O's Summary    PHYSICAL EXAM:  General: Generally ill appearing, in bed but in NAD.   Cardiac: S1, S2 normal. III/VI systolic murmur, no rubs or gallops.  Respiratory: CTA throughout with good air entry.  MSK: No erythema, tenderness.  L. AKA, R. BKA  Skin: Chronic PVD changes  Neurologic:  Mental Status: Awake, inattentive, psychomotor slowing, oriented to self and situation, impaired memory, inattentive but follows simple one step midline and axial commands.   Language: Speech is clear, fluent with preserved naming, repetition and comprehension.    Cranial Nerves: PERRL (R = 3mm, L = 3mm). B/L homonymous hemianopsia.  Severe visual impairment only recognizing large colored objects and waving hands, nearly coritcally blind., occular apraxia OD dysconjugate gaze at rest with exophoria.  Otherwise EOMI.  B/L horizontal nystagmus.  V1-3 intact to light touch.  No facial asymmetry b/l, full eye closure strength b/l. Hearing grossly normal to conversation.  Symmetric palate elevation in midline.  Head turning & shoulder shrug intact b/l. Tongue midline, normal movements, no atrophy.  Motor: Generalized decreased muscle bulk, increased tone.  No noticeable tremor, myoclonus L arm pronator drift. 4+/5 throughout B/L UE.  3/5 left stump, 3/5 with drift R. stump.   Sensation: Decreased sensation to LT in L arm and left stump which he says is improving compared to prior.   Cortical: No extinction on double simultaneous touch and no signs of neglect.   Reflexes: 2/4 throughout RUE, 3/4 L. biceps otherwise 2/4 L. arm.  Unable to test further reflexes 2* amputations.    Coordination: Impaired B/L STEPHANIE in the hands, ataxia on L>R FTN though limited by significant visual impairment.    CBC:                        11.6   4.73  )-----------( 202      ( 21 Feb 2021 07:29 )             38.0       CMP:  02-21    134<L>  |  94<L>  |  51<H>  ----------------------------<  64<L>  4.6   |  26  |  5.20<H>    Ca    9.1      21 Feb 2021 07:29  Mg     2.4     02-21    TPro  7.8  /  Alb  3.7  /  TBili  0.4  /  DBili  x   /  AST  18  /  ALT  29  /  AlkPhos  119  02-21    PT/INR - ( 20 Feb 2021 16:46 )   PT: 11.2 sec;   INR: 0.98 ratio PTT - ( 20 Feb 2021 16:46 )  PTT:21.1 sec    CT Brain Stroke Protocol (02.20.21 @ 16:20)   Encephalomalacia and gliosis in the occipital lobes bilaterally, likely secondary to prior infarcts, unchanged. There is associated ex vacuo dilatation of the occipital horns of the lateral ventricles bilaterally.  Redemonstration of chronic bilateral cerebellar infarcts, right greater than left, unchanged.  Periventricular and white matter hypoattenuation likely related to chronic microvascular ischemic changes.    IMPRESSION: There is no acute intrarenal hemorrhage or acute territorial infarction.

## 2021-02-20 NOTE — H&P ADULT - NSHPPHYSICALEXAM_GEN_ALL_CORE
LOS:     VITALS:   T(C): 36.8 (02-20-21 @ 16:32), Max: 36.8 (02-20-21 @ 16:03)  HR: 73 (02-20-21 @ 16:32) (65 - 73)  BP: 183/62 (02-20-21 @ 16:32) (157/67 - 183/62)  RR: 15 (02-20-21 @ 16:32) (15 - 18)  SpO2: 100% (02-20-21 @ 16:32) (100% - 100%)    GENERAL: NAD, lying in bed comfortably  HEAD:  Atraumatic, Normocephalic  EYES: EOMI, PERRLA, conjunctiva and sclera clear  ENT: Moist mucous membranes  NECK: Supple, No JVD  CHEST/LUNG: Clear to auscultation bilaterally; No rales, rhonchi, wheezing, or rubs. Unlabored respirations  HEART: Regular rate and rhythm; No murmurs, rubs, or gallops  ABDOMEN: BSx4; Soft, nontender, nondistended  EXTREMITIES: Rt BKA,Lt AKA  NERVOUS SYSTEM:  A&Ox3,  weakness of lt hand-weak ,able to move lt extremity  SKIN: No rashes or lesions

## 2021-02-20 NOTE — H&P ADULT - PROBLEM SELECTOR PLAN 1
Pt with lt hand weakness.  CT head-neg  seen by Neuro-  cont ASA,  lipitor 80mg daily  neurocheck q 4hrs  Stroke workup:  -CTA not done due severe ESRD with dialysis just done today. PT not being thrombectomy candidate and acute changes improving.  PT however does need vessel imaging ASAP.    ---->If can get done in expedited way and no contraindications, then would do MRA Head w/o contrast and MRA neck w/wo contrast.   ---->Otherwise, would do CTA H&N with planned HD coordinated w/nephrology given PT has multiple posterior circulation infarcts and only on intermittent ASA   -MRI brain w/o contrast if no contraindications.  Otherwise, repeat CT head in 24 hrs.   -TTE w/ bubble study, continuous cardiac telemetry to monitor for arrhythmia  -Stroke labwork: HgbA1C, fasting lipid panel, CBC, CMP, coag panel, troponin  -Baseline EKG and CXR  -If vessel imaging does not demonstrate significant ICAD in posterior circulation then PT will need ILR or 30 day holter monitoring.    NPO now,diabetic diet once he passes swallow eval  will check Hba1c,Lipid profile  PT/OT

## 2021-02-20 NOTE — ED PROVIDER NOTE - OBJECTIVE STATEMENT
Attending/Octavio: 65 yo M p/w code stroke. He is a 66 year-old male with h/o CVA, DM, HTN, right BKA, left AKA, ESRD on HD M/W/F at [facility] who p/w left hand weakness. Pt notes since last night left hand numbness/weakness. Today he had relayed that to someone at facility who then transferred pt to ED for further evaluated.

## 2021-02-20 NOTE — CONSULT NOTE ADULT - ATTENDING COMMENTS
The case was presented on rounds, the chart and neuro imaging were reviewed and the patient was examined at the bedside.  Agree with above history and physical examination.  On today’s examination, the patient is reporting clumsiness with the left hand and pain on flexion of the fingers.  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  Despite the findings on CT scan, I cannot demonstrate a visual field cut.  There is finger to nose ataxia in the left upper extremity without clear weakness.  The CT scan of the head reveals multiple chronic infarcts including both occipital lobes and cerebellar hemispheres with small vessel ischemic changes.  Agree with above assessment and plan.  We will continue to recommend treatment with aspirin and Plavix until vessel imaging has been completed.

## 2021-02-21 LAB
A1C WITH ESTIMATED AVERAGE GLUCOSE RESULT: 5.1 % — SIGNIFICANT CHANGE UP (ref 4–5.6)
ALBUMIN SERPL ELPH-MCNC: 3.7 G/DL — SIGNIFICANT CHANGE UP (ref 3.3–5)
ALP SERPL-CCNC: 119 U/L — SIGNIFICANT CHANGE UP (ref 40–120)
ALT FLD-CCNC: 29 U/L — SIGNIFICANT CHANGE UP (ref 4–41)
ANION GAP SERPL CALC-SCNC: 14 MMOL/L — SIGNIFICANT CHANGE UP (ref 7–14)
AST SERPL-CCNC: 18 U/L — SIGNIFICANT CHANGE UP (ref 4–40)
BILIRUB SERPL-MCNC: 0.4 MG/DL — SIGNIFICANT CHANGE UP (ref 0.2–1.2)
BUN SERPL-MCNC: 51 MG/DL — HIGH (ref 7–23)
CALCIUM SERPL-MCNC: 9.1 MG/DL — SIGNIFICANT CHANGE UP (ref 8.4–10.5)
CHLORIDE SERPL-SCNC: 94 MMOL/L — LOW (ref 98–107)
CHOLEST SERPL-MCNC: 117 MG/DL — SIGNIFICANT CHANGE UP
CO2 SERPL-SCNC: 26 MMOL/L — SIGNIFICANT CHANGE UP (ref 22–31)
CREAT SERPL-MCNC: 5.2 MG/DL — HIGH (ref 0.5–1.3)
ESTIMATED AVERAGE GLUCOSE: 100 MG/DL — SIGNIFICANT CHANGE UP (ref 68–114)
GLUCOSE SERPL-MCNC: 64 MG/DL — LOW (ref 70–99)
HCT VFR BLD CALC: 38 % — LOW (ref 39–50)
HDLC SERPL-MCNC: 44 MG/DL — SIGNIFICANT CHANGE UP
HGB BLD-MCNC: 11.6 G/DL — LOW (ref 13–17)
LIPID PNL WITH DIRECT LDL SERPL: 60 MG/DL — SIGNIFICANT CHANGE UP
MAGNESIUM SERPL-MCNC: 2.4 MG/DL — SIGNIFICANT CHANGE UP (ref 1.6–2.6)
MCHC RBC-ENTMCNC: 27.9 PG — SIGNIFICANT CHANGE UP (ref 27–34)
MCHC RBC-ENTMCNC: 30.5 GM/DL — LOW (ref 32–36)
MCV RBC AUTO: 91.3 FL — SIGNIFICANT CHANGE UP (ref 80–100)
NON HDL CHOLESTEROL: 73 MG/DL — SIGNIFICANT CHANGE UP
NRBC # BLD: 0 /100 WBCS — SIGNIFICANT CHANGE UP
NRBC # FLD: 0 K/UL — SIGNIFICANT CHANGE UP
PLATELET # BLD AUTO: 202 K/UL — SIGNIFICANT CHANGE UP (ref 150–400)
POTASSIUM SERPL-MCNC: 4.6 MMOL/L — SIGNIFICANT CHANGE UP (ref 3.5–5.3)
POTASSIUM SERPL-SCNC: 4.6 MMOL/L — SIGNIFICANT CHANGE UP (ref 3.5–5.3)
PROT SERPL-MCNC: 7.8 G/DL — SIGNIFICANT CHANGE UP (ref 6–8.3)
RBC # BLD: 4.16 M/UL — LOW (ref 4.2–5.8)
RBC # FLD: 15.3 % — HIGH (ref 10.3–14.5)
SARS-COV-2 IGG SERPL QL IA: POSITIVE
SARS-COV-2 IGM SERPL IA-ACNC: 176 INDEX — HIGH
SARS-COV-2 RNA SPEC QL NAA+PROBE: SIGNIFICANT CHANGE UP
SODIUM SERPL-SCNC: 134 MMOL/L — LOW (ref 135–145)
TRIGL SERPL-MCNC: 63 MG/DL — SIGNIFICANT CHANGE UP
WBC # BLD: 4.73 K/UL — SIGNIFICANT CHANGE UP (ref 3.8–10.5)
WBC # FLD AUTO: 4.73 K/UL — SIGNIFICANT CHANGE UP (ref 3.8–10.5)

## 2021-02-21 RX ADMIN — Medication 1 DROP(S): at 17:54

## 2021-02-21 RX ADMIN — Medication 1 DROP(S): at 10:44

## 2021-02-21 RX ADMIN — Medication 81 MILLIGRAM(S): at 10:42

## 2021-02-21 RX ADMIN — HEPARIN SODIUM 5000 UNIT(S): 5000 INJECTION INTRAVENOUS; SUBCUTANEOUS at 17:55

## 2021-02-21 RX ADMIN — Medication 667 MILLIGRAM(S): at 17:55

## 2021-02-21 RX ADMIN — Medication 667 MILLIGRAM(S): at 10:42

## 2021-02-21 RX ADMIN — Medication 667 MILLIGRAM(S): at 12:55

## 2021-02-21 RX ADMIN — LACTULOSE 20 GRAM(S): 10 SOLUTION ORAL at 22:42

## 2021-02-21 RX ADMIN — HEPARIN SODIUM 5000 UNIT(S): 5000 INJECTION INTRAVENOUS; SUBCUTANEOUS at 05:15

## 2021-02-21 RX ADMIN — ATORVASTATIN CALCIUM 80 MILLIGRAM(S): 80 TABLET, FILM COATED ORAL at 22:43

## 2021-02-21 RX ADMIN — LATANOPROST 1 DROP(S): 0.05 SOLUTION/ DROPS OPHTHALMIC; TOPICAL at 22:43

## 2021-02-21 RX ADMIN — SENNA PLUS 2 TABLET(S): 8.6 TABLET ORAL at 22:43

## 2021-02-21 NOTE — CONSULT NOTE ADULT - ASSESSMENT
66yr old male with history of HTN, DM, Stroke, admitted for left hand weakness. Nephrology consulted for hemodialysis treatment      ESRD   HD MWF at [facility]   HD on 2/22/21  Resume all medicationn  Renal diet  Consent in chart      HTN  Suboptimal  Resume all medications  Monitor BP    Hyponatremia  In the setting of HD patient  Monitor   66yr old male with history of HTN, DM, Stroke, admitted for left hand weakness. Nephrology consulted for hemodialysis treatment    A/P:  ESRD   HD MWF at [facility]   HD on 2/22/21  Resume all medicationn  Renal diet  Consent in chart    HTN  Suboptimal  Resume all home medications  Monitor BP    Hyponatremia  In the setting of HD patient  Monitor    Anemia  at goal  Monitor Hb    CKD:MBD:  Check Po4, PTH

## 2021-02-21 NOTE — PHYSICAL THERAPY INITIAL EVALUATION ADULT - DISCHARGE DISPOSITION, PT EVAL
return to long term care facility with PT services. patient is at functional baseline (total assist). will not be followed by [facility] PT service

## 2021-02-21 NOTE — PHYSICAL THERAPY INITIAL EVALUATION ADULT - ADDITIONAL COMMENTS
patient reports he has been unable to ambulate since his bilateral LE amputations. he reports he has prosthetics, but has never walked with them. He reports he is total assist at the rehab/nursing facility- 2 people transfer him out of bed to his wheelchair (sometimes they use the la lift). He reports he is usually able to self propel wheelchair at baseline

## 2021-02-21 NOTE — CONSULT NOTE ADULT - SUBJECTIVE AND OBJECTIVE BOX
Initial Renal Consult Note-------Date of Service---2-21-21         HPI:  66yr old male presented to lt hand weakness.  Pt states that he noticed weakness on his left hand last night, but didnt tell anyone. He told the people at the rehab todAY so,he was sent to the ED.  His lt hand weakness is same as  yesterday.  denies loss of sensation.No slurry speech,blurry vision,double vision,facial droop  or weakness of other part of the body.  Denies headache,nausea.vomiting.   (20 Feb 2021 18:57)      Allergies:  No Known Allergies      PAST MEDICAL & SURGICAL HISTORY:  Kidney disease    HTN (hypertension)    DM (diabetes mellitus)    Wound  (chronic wounds to left foot and leg)    MRSA (methicillin resistant Staphylococcus aureus) colonization  (hx/o MRSA growth from wound culture)    CKD (chronic kidney disease)    Below knee amputation status, right    HTN (hypertension)    DM (diabetes mellitus)    Amputated right leg  below knee    Amputated left leg  above knee    H/O peripheral artery bypass  left fem to below-knee pop with RSVG    S/P BKA (below knee amputation) unilateral, right        Home Medications Reviewed    Hospital Medications:   MEDICATIONS  (STANDING):  aspirin enteric coated 81 milliGRAM(s) Oral daily  atorvastatin 80 milliGRAM(s) Oral at bedtime  calcium acetate 667 milliGRAM(s) Oral three times a day with meals  dextrose 40% Gel 15 Gram(s) Oral once  dextrose 5%. 1000 milliLiter(s) (50 mL/Hr) IV Continuous <Continuous>  dextrose 5%. 1000 milliLiter(s) (100 mL/Hr) IV Continuous <Continuous>  dextrose 50% Injectable 25 Gram(s) IV Push once  dextrose 50% Injectable 12.5 Gram(s) IV Push once  dextrose 50% Injectable 25 Gram(s) IV Push once  glucagon  Injectable 1 milliGRAM(s) IntraMuscular once  heparin   Injectable 5000 Unit(s) SubCutaneous every 12 hours  insulin lispro (ADMELOG) corrective regimen sliding scale   SubCutaneous three times a day before meals  lactulose Syrup 20 Gram(s) Oral at bedtime  latanoprost 0.005% Ophthalmic Solution 1 Drop(s) Both EYES at bedtime  prednisoLONE acetate 1% Suspension 1 Drop(s) Left EYE two times a day  senna 2 Tablet(s) Oral at bedtime      SOCIAL HISTORY:  Denies ETOh, Smoking,     FAMILY HISTORY:  Family history of diabetes mellitus        REVIEW OF SYSTEMS:  CONSTITUTIONAL: No weakness, fevers or chills  EYES/ENT: No visual changes;  No vertigo or throat pain   NECK: No pain or stiffness  RESPIRATORY: No cough, wheezing, hemoptysis; No shortness of breath  CARDIOVASCULAR: No chest pain or palpitations.  GASTROINTESTINAL: No abdominal or epigastric pain. No nausea, vomiting, or hematemesis; No diarrhea or constipation. No melena or hematochezia.  GENITOURINARY: No dysuria, frequency, foamy urine, urinary urgency, incontinence or hematuria  NEUROLOGICAL: No numbness or weakness  SKIN: No itching, burning, rashes, or lesions   VASCULAR: No bilateral lower extremity edema.   All other review of systems is negative unless indicated above.    VITALS:  T(F): 97.8 (02-21-21 @ 04:21), Max: 98.2 (02-20-21 @ 16:03)  HR: 60 (02-21-21 @ 08:20)  BP: 181/74 (02-21-21 @ 08:20)  RR: 18 (02-21-21 @ 04:21)  SpO2: 98% (02-21-21 @ 04:21)  Wt(kg): --    Height (cm): 198.1 (02-20 @ 16:03)  Weight (kg): 65.7 (02-21 @ 04:21)  BMI (kg/m2): 16.7 (02-21 @ 04:21)  BSA (m2): 1.97 (02-21 @ 04:21)    PHYSICAL EXAM:  Constitutional: NAD  HEENT: anicteric sclera, oropharynx clear, MMM  Neck: No JVD  Respiratory: CTAB, no wheezes, rales or rhonchi  Cardiovascular: S1, S2, RRR  Gastrointestinal: BS+, soft, NT/ND  Extremities: RBKA, LAKA  Neurological: A/O x 3, no focal deficits  Psychiatric: Normal mood, normal affect  : No CVA tenderness. No cervantes.   Skin: No rashes  Vascular Access: AVF    LABS:  02-21    134<L>  |  94<L>  |  51<H>  ----------------------------<  64<L>  4.6   |  26  |  5.20<H>    Ca    9.1      21 Feb 2021 07:29  Mg     2.4     02-21    TPro  7.8  /  Alb  3.7  /  TBili  0.4  /  DBili      /  AST  18  /  ALT  29  /  AlkPhos  119  02-21    Creatinine Trend: 5.20 <--, 4.20 <--                        11.6   4.73  )-----------( 202      ( 21 Feb 2021 07:29 )             38.0     Urine Studies:        RADIOLOGY & ADDITIONAL STUDIES:                 [facility]  Office : [phone]    Nino Navarro MD  Cell [phone]    Gio Hopson DO  Cell: [phone]    Nathaniel Karimi MD  Cell [phone]    BECKI [place]  Cell: [phone]    From 5pm-7am answering Service [phone]      Initial Renal Consult Note-------Date of Service---2-21-21      HPI:  66yr old male presented to lt hand weakness.  Pt states that he noticed weakness on his left hand last night, but didnt tell anyone. He told the people at the rehab todAY so,he was sent to the ED.  His lt hand weakness is same as  yesterday.  denies loss of sensation.No slurry speech,blurry vision,double vision,facial droop  or weakness of other part of the body.  Denies headache,nausea.vomiting.   (20 Feb 2021 18:57)      Allergies:  No Known Allergies      PAST MEDICAL & SURGICAL HISTORY:  Kidney disease    HTN (hypertension)    DM (diabetes mellitus)    Wound  (chronic wounds to left foot and leg)    MRSA (methicillin resistant Staphylococcus aureus) colonization  (hx/o MRSA growth from wound culture)    CKD (chronic kidney disease)    Below knee amputation status, right    HTN (hypertension)    DM (diabetes mellitus)    Amputated right leg  below knee    Amputated left leg  above knee    H/O peripheral artery bypass  left fem to below-knee pop with RSVG    S/P BKA (below knee amputation) unilateral, right        Home Medications Reviewed    Hospital Medications:   MEDICATIONS  (STANDING):  aspirin enteric coated 81 milliGRAM(s) Oral daily  atorvastatin 80 milliGRAM(s) Oral at bedtime  calcium acetate 667 milliGRAM(s) Oral three times a day with meals  dextrose 40% Gel 15 Gram(s) Oral once  dextrose 5%. 1000 milliLiter(s) (50 mL/Hr) IV Continuous <Continuous>  dextrose 5%. 1000 milliLiter(s) (100 mL/Hr) IV Continuous <Continuous>  dextrose 50% Injectable 25 Gram(s) IV Push once  dextrose 50% Injectable 12.5 Gram(s) IV Push once  dextrose 50% Injectable 25 Gram(s) IV Push once  glucagon  Injectable 1 milliGRAM(s) IntraMuscular once  heparin   Injectable 5000 Unit(s) SubCutaneous every 12 hours  insulin lispro (ADMELOG) corrective regimen sliding scale   SubCutaneous three times a day before meals  lactulose Syrup 20 Gram(s) Oral at bedtime  latanoprost 0.005% Ophthalmic Solution 1 Drop(s) Both EYES at bedtime  prednisoLONE acetate 1% Suspension 1 Drop(s) Left EYE two times a day  senna 2 Tablet(s) Oral at bedtime      SOCIAL HISTORY:  Denies ETOh, Smoking,     FAMILY HISTORY:  Family history of diabetes mellitus        REVIEW OF SYSTEMS:  CONSTITUTIONAL: No weakness, fevers or chills  EYES/ENT: No visual changes;  No vertigo or throat pain   NECK: No pain or stiffness  RESPIRATORY: No cough, wheezing, hemoptysis; No shortness of breath  CARDIOVASCULAR: No chest pain or palpitations.  GASTROINTESTINAL: No abdominal or epigastric pain. No nausea, vomiting, or hematemesis; No diarrhea or constipation. No melena or hematochezia.  GENITOURINARY: No dysuria, frequency, foamy urine, urinary urgency, incontinence or hematuria  NEUROLOGICAL: No numbness or weakness  SKIN: No itching, burning, rashes, or lesions   VASCULAR: No bilateral lower extremity edema.   All other review of systems is negative unless indicated above.    VITALS:  T(F): 97.8 (02-21-21 @ 04:21), Max: 98.2 (02-20-21 @ 16:03)  HR: 60 (02-21-21 @ 08:20)  BP: 181/74 (02-21-21 @ 08:20)  RR: 18 (02-21-21 @ 04:21)  SpO2: 98% (02-21-21 @ 04:21)  Wt(kg): --    Height (cm): 198.1 (02-20 @ 16:03)  Weight (kg): 65.7 (02-21 @ 04:21)  BMI (kg/m2): 16.7 (02-21 @ 04:21)  BSA (m2): 1.97 (02-21 @ 04:21)    PHYSICAL EXAM:  Constitutional: NAD  HEENT: anicteric sclera, oropharynx clear, MMM  Neck: No JVD  Respiratory: CTAB, no wheezes, rales or rhonchi  Cardiovascular: S1, S2, RRR  Gastrointestinal: BS+, soft, NT/ND  Extremities: RBKA, LAKA  Neurological: A/O x 3, no focal deficits  Psychiatric: Normal mood, normal affect  : No CVA tenderness. No cervantes.   Skin: No rashes  Vascular Access: AVF    LABS:  02-21    134<L>  |  94<L>  |  51<H>  ----------------------------<  64<L>  4.6   |  26  |  5.20<H>    Ca    9.1      21 Feb 2021 07:29  Mg     2.4     02-21    TPro  7.8  /  Alb  3.7  /  TBili  0.4  /  DBili      /  AST  18  /  ALT  29  /  AlkPhos  119  02-21    Creatinine Trend: 5.20 <--, 4.20 <--                        11.6   4.73  )-----------( 202      ( 21 Feb 2021 07:29 )             38.0     Urine Studies:        RADIOLOGY & ADDITIONAL STUDIES:                 [facility]  Office : [phone]    Nino Navarro MD  Cell [phone]    Gio Hopson DO  Cell: [phone]    Nathaniel Karimi MD  Cell [phone]    BECKI [place]  Cell: [phone]    From 5pm-7am answering Service [phone]      Initial Renal Consult Note-------Date of Service---2-21-21      HPI:  66yr old male ESRD on HD presented with lt hand weakness.  denies loss of sensation.No slurry speech,blurry vision,double vision,facial droop  or weakness of other part of the body.  Denies headache,nausea.vomiting.         Allergies:  No Known Allergies      PAST MEDICAL & SURGICAL HISTORY:  Kidney disease    HTN (hypertension)    DM (diabetes mellitus)    Wound  (chronic wounds to left foot and leg)    MRSA (methicillin resistant Staphylococcus aureus) colonization  (hx/o MRSA growth from wound culture)    CKD (chronic kidney disease)    Below knee amputation status, right    HTN (hypertension)    DM (diabetes mellitus)    Amputated right leg  below knee    Amputated left leg  above knee    H/O peripheral artery bypass  left fem to below-knee pop with RSVG    S/P BKA (below knee amputation) unilateral, right        Home Medications Reviewed    Hospital Medications:   MEDICATIONS  (STANDING):  aspirin enteric coated 81 milliGRAM(s) Oral daily  atorvastatin 80 milliGRAM(s) Oral at bedtime  calcium acetate 667 milliGRAM(s) Oral three times a day with meals  dextrose 40% Gel 15 Gram(s) Oral once  dextrose 5%. 1000 milliLiter(s) (50 mL/Hr) IV Continuous <Continuous>  dextrose 5%. 1000 milliLiter(s) (100 mL/Hr) IV Continuous <Continuous>  dextrose 50% Injectable 25 Gram(s) IV Push once  dextrose 50% Injectable 12.5 Gram(s) IV Push once  dextrose 50% Injectable 25 Gram(s) IV Push once  glucagon  Injectable 1 milliGRAM(s) IntraMuscular once  heparin   Injectable 5000 Unit(s) SubCutaneous every 12 hours  insulin lispro (ADMELOG) corrective regimen sliding scale   SubCutaneous three times a day before meals  lactulose Syrup 20 Gram(s) Oral at bedtime  latanoprost 0.005% Ophthalmic Solution 1 Drop(s) Both EYES at bedtime  prednisoLONE acetate 1% Suspension 1 Drop(s) Left EYE two times a day  senna 2 Tablet(s) Oral at bedtime      SOCIAL HISTORY:  Denies ETOh, Smoking,     FAMILY HISTORY:  Family history of diabetes mellitus        REVIEW OF SYSTEMS:  CONSTITUTIONAL: No weakness, fevers or chills  EYES/ENT: No visual changes;  No vertigo or throat pain   NECK: No pain or stiffness  RESPIRATORY: No cough, wheezing, hemoptysis; No shortness of breath  CARDIOVASCULAR: No chest pain or palpitations.  GASTROINTESTINAL: No abdominal or epigastric pain. No nausea, vomiting, or hematemesis; No diarrhea or constipation. No melena or hematochezia.  GENITOURINARY: No dysuria, frequency, foamy urine, urinary urgency, incontinence or hematuria  NEUROLOGICAL: as per HPI  SKIN: No itching, burning, rashes, or lesions   VASCULAR: No bilateral lower extremity edema.   All other review of systems is negative unless indicated above.    VITALS:  T(F): 97.8 (02-21-21 @ 04:21), Max: 98.2 (02-20-21 @ 16:03)  HR: 60 (02-21-21 @ 08:20)  BP: 181/74 (02-21-21 @ 08:20)  RR: 18 (02-21-21 @ 04:21)  SpO2: 98% (02-21-21 @ 04:21)  Wt(kg): --    Height (cm): 198.1 (02-20 @ 16:03)  Weight (kg): 65.7 (02-21 @ 04:21)  BMI (kg/m2): 16.7 (02-21 @ 04:21)  BSA (m2): 1.97 (02-21 @ 04:21)    PHYSICAL EXAM:  Constitutional: NAD  HEENT: anicteric sclera, oropharynx clear, MMM  Neck: No JVD  Respiratory: CTAB, no wheezes, rales or rhonchi  Cardiovascular: S1, S2, RRR  Gastrointestinal: BS+, soft, NT/ND  Extremities: RBKA, LAKA  Neurological: A/O x 3   Psychiatric: Normal mood, normal affect  : No CVA tenderness. No cervantes.   Skin: No rashes  Vascular Access: AVF    LABS:  02-21    134<L>  |  94<L>  |  51<H>  ----------------------------<  64<L>  4.6   |  26  |  5.20<H>    Ca    9.1      21 Feb 2021 07:29  Mg     2.4     02-21    TPro  7.8  /  Alb  3.7  /  TBili  0.4  /  DBili      /  AST  18  /  ALT  29  /  AlkPhos  119  02-21    Creatinine Trend: 5.20 <--, 4.20 <--                        11.6   4.73  )-----------( 202      ( 21 Feb 2021 07:29 )             38.0     Urine Studies:        RADIOLOGY & ADDITIONAL STUDIES:

## 2021-02-21 NOTE — PATIENT PROFILE ADULT - NS PRO AD PATIENT TYPE
Pt stated call the nursing home for this information. However patient stated he wants everything done possible to sustain his life

## 2021-02-21 NOTE — SWALLOW BEDSIDE ASSESSMENT ADULT - COMMENTS
Neurology note 2/20/2021: 66y R-handed M with h/o ESRD on HD (M/W/F), HTN, DM, MRSA colonization, R. BKA, L. AKA and multiple chronic embolic infarcts (B/L occipital, B/L cerebellar, L. temporal) with residual visual impairment p/w acute onset left hypoesthetic hemiparesis after most recent HD session, improving but not back to baseline.   PT is unclear of his meds but notes he is inconsistently compliant with ASA 81.     CT Head 2/20/2021: No acute intracranial hemorrhage or acute territorial infarction.    No available CXR for review.     Consult received and chart reviewed. Patient seen at bedside for clinical swallow evaluation; patient alert and oriented to self, place and situation. Patient able to follow commands and make wants/needs known. Patient reporting no difficulties with swallowing.    Results and recommendations discussed with patient and RN on unit. Called out to team.

## 2021-02-21 NOTE — PHYSICAL THERAPY INITIAL EVALUATION ADULT - LEVEL OF INDEPENDENCE: SUPINE/SIT, REHAB EVAL
patient unable to sit unsupported- requires assist to remain upright/moderate assist (50% patients effort)

## 2021-02-21 NOTE — OCCUPATIONAL THERAPY INITIAL EVALUATION ADULT - PLANNED THERAPY INTERVENTIONS, OT EVAL
ADL retraining/balance training/bed mobility training/fine motor coordination training/neuromuscular re-education/strengthening

## 2021-02-21 NOTE — SWALLOW BEDSIDE ASSESSMENT ADULT - SWALLOW EVAL: DIAGNOSIS
1. Patient demonstrated functional oral management of regular solids and thin liquids characterized by adequate bolus mastication, manipulation, containment, propulsion and oral clearance. 2. Patient demonstrated functional pharyngeal phase of swallow with regular solids and thin liquids characterized by adequate pharyngeal swallow trigger and present hyolaryngeal elevation upon digital palpation. No overt signs or symptoms of aspiration/penetration noted.

## 2021-02-21 NOTE — OCCUPATIONAL THERAPY INITIAL EVALUATION ADULT - PHYSICAL ASSIST/NONPHYSICAL ASSIST: SUPINE/SIT, REHAB EVAL
ASSESSMENT/PLAN:  Emeka was seen today for medication management.    Diagnoses and all orders for this visit:    Need for HPV vaccine  -     HPV VACCINE 9 VALENT    Need for Tdap vaccination  -     TETANUS/DIPHTHERIA/ACELLULAR PERTUSSIS 10+ (BOOSTRIX)    Need for meningococcus vaccine  -     MENINGOCOCCAL CONJUGATE MCV4O (MENVEO)    Attention deficit hyperactivity disorder (ADHD), combined type  -     Discontinue: guanFACINE 4 MG extended-release tablet; Indications: Attention Deficit Hyperactivity Disorder 1 tab po qd  -     Discontinue: guanFACINE 4 MG extended-release tablet; Indications: Attention Deficit Hyperactivity Disorder 1 tab po qd  -     guanFACINE 4 MG extended-release tablet; Indications: Attention Deficit Hyperactivity Disorder 1 tab po qd  -     guanFACINE 4 MG extended-release tablet; Indications: Attention Deficit Hyperactivity Disorder 1 tab po qd    follow up in 6 months, sooner if adjustment is warranted.  Return in about 6 months (around 4/17/2018).        SUBJECTIVE: She is here today for a medication recheck.  She had done well on guanfacine last spring but stopped it over the summer.  Their household is busy and chaotic over the summer with siblings.  She spent more time with her mother and it was noted that she was misdirected and squirrelly.  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  They would like to restart the medicine.    She is about 4 inches taller than a year ago.  We discuss changes of dosing are potential while she is growing.  She attends [employer]    Patient Active Problem List   Diagnosis   • ADHD (attention deficit hyperactivity disorder)   • Frequent UTI         REVIEW OF SYSTEMS:      Current Outpatient Prescriptions   Medication Sig Dispense Refill   • CRANBERRY EXTRACT PO      • ibuprofen (CHILDRENS MOTRIN)  100 MG/5ML suspension Take 13.4 mLs by mouth every 6 hours as needed for Fever. 120 mL 0   • acetaminophen (TYLENOL) 160 MG/5ML elixir Take 12.6 mLs by mouth every 4 hours as needed for Fever. 120 mL 0   • [START ON 11/17/2017] guanFACINE 4 MG extended-release tablet Indications: Attention Deficit Hyperactivity Disorder 1 tab po qd 30 tablet 0     No current facility-administered medications for this visit.          I have reviewed the allergies, medication list and past medical, family, and social history sections listed in the above medical record as well as any pertinent nursing notes.    OBJECTIVE:    General: Well-developed, well-nourished, in no apparent distress, non-toxic appearance.   Respiratory:  No respiratory distress, normal breath sounds, no rales, wheezing or rhonchi noted.  Cardio:   Normal rate, normal rhythm. No murmurs noted.   Neurologic:   Alert and oriented normal, normal motor function, normal sensory function.    Psychiatric:   Speech and behavior appropriate.  Patient cooperative for 3 shots.  Very exuberrant.        1 person assist

## 2021-02-21 NOTE — PHYSICAL THERAPY INITIAL EVALUATION ADULT - PERTINENT HX OF CURRENT PROBLEM, REHAB EVAL
Patient is a 66 year old male admitted for CVA, left hand weakness. PMH significant for LLE AKA, RLE BKA

## 2021-02-22 LAB
ANION GAP SERPL CALC-SCNC: 16 MMOL/L — HIGH (ref 7–14)
BUN SERPL-MCNC: 66 MG/DL — HIGH (ref 7–23)
CALCIUM SERPL-MCNC: 8.9 MG/DL — SIGNIFICANT CHANGE UP (ref 8.4–10.5)
CHLORIDE SERPL-SCNC: 93 MMOL/L — LOW (ref 98–107)
CO2 SERPL-SCNC: 22 MMOL/L — SIGNIFICANT CHANGE UP (ref 22–31)
CREAT SERPL-MCNC: 6.17 MG/DL — HIGH (ref 0.5–1.3)
DIALYSIS INSTRUMENT RESULT - HEPATITIS B SURFACE ANTIGEN: NEGATIVE — SIGNIFICANT CHANGE UP
GLUCOSE SERPL-MCNC: 101 MG/DL — HIGH (ref 70–99)
HCT VFR BLD CALC: 36.7 % — LOW (ref 39–50)
HGB BLD-MCNC: 11.5 G/DL — LOW (ref 13–17)
MAGNESIUM SERPL-MCNC: 2.4 MG/DL — SIGNIFICANT CHANGE UP (ref 1.6–2.6)
MCHC RBC-ENTMCNC: 28 PG — SIGNIFICANT CHANGE UP (ref 27–34)
MCHC RBC-ENTMCNC: 31.3 GM/DL — LOW (ref 32–36)
MCV RBC AUTO: 89.3 FL — SIGNIFICANT CHANGE UP (ref 80–100)
NRBC # BLD: 0 /100 WBCS — SIGNIFICANT CHANGE UP
NRBC # FLD: 0 K/UL — SIGNIFICANT CHANGE UP
PHOSPHATE SERPL-MCNC: 4.8 MG/DL — HIGH (ref 2.5–4.5)
PLATELET # BLD AUTO: 186 K/UL — SIGNIFICANT CHANGE UP (ref 150–400)
POTASSIUM SERPL-MCNC: 5.1 MMOL/L — SIGNIFICANT CHANGE UP (ref 3.5–5.3)
POTASSIUM SERPL-SCNC: 5.1 MMOL/L — SIGNIFICANT CHANGE UP (ref 3.5–5.3)
RBC # BLD: 4.11 M/UL — LOW (ref 4.2–5.8)
RBC # FLD: 15 % — HIGH (ref 10.3–14.5)
SODIUM SERPL-SCNC: 131 MMOL/L — LOW (ref 135–145)
WBC # BLD: 6.19 K/UL — SIGNIFICANT CHANGE UP (ref 3.8–10.5)
WBC # FLD AUTO: 6.19 K/UL — SIGNIFICANT CHANGE UP (ref 3.8–10.5)

## 2021-02-22 PROCEDURE — 70450 CT HEAD/BRAIN W/O DYE: CPT | Mod: 26

## 2021-02-22 RX ORDER — NIFEDIPINE 30 MG
60 TABLET, EXTENDED RELEASE 24 HR ORAL ONCE
Refills: 0 | Status: COMPLETED | OUTPATIENT
Start: 2021-02-22 | End: 2021-02-22

## 2021-02-22 RX ORDER — LABETALOL HCL 100 MG
200 TABLET ORAL
Refills: 0 | Status: DISCONTINUED | OUTPATIENT
Start: 2021-02-22 | End: 2021-02-26

## 2021-02-22 RX ORDER — NIFEDIPINE 30 MG
60 TABLET, EXTENDED RELEASE 24 HR ORAL AT BEDTIME
Refills: 0 | Status: DISCONTINUED | OUTPATIENT
Start: 2021-02-23 | End: 2021-02-26

## 2021-02-22 RX ORDER — ONDANSETRON 8 MG/1
4 TABLET, FILM COATED ORAL ONCE
Refills: 0 | Status: COMPLETED | OUTPATIENT
Start: 2021-02-22 | End: 2021-02-22

## 2021-02-22 RX ADMIN — HEPARIN SODIUM 5000 UNIT(S): 5000 INJECTION INTRAVENOUS; SUBCUTANEOUS at 17:49

## 2021-02-22 RX ADMIN — Medication 1 MILLIGRAM(S): at 15:00

## 2021-02-22 RX ADMIN — LACTULOSE 20 GRAM(S): 10 SOLUTION ORAL at 23:40

## 2021-02-22 RX ADMIN — Medication 60 MILLIGRAM(S): at 02:39

## 2021-02-22 RX ADMIN — Medication 667 MILLIGRAM(S): at 14:14

## 2021-02-22 RX ADMIN — Medication 81 MILLIGRAM(S): at 14:14

## 2021-02-22 RX ADMIN — Medication 200 MILLIGRAM(S): at 05:45

## 2021-02-22 RX ADMIN — SENNA PLUS 2 TABLET(S): 8.6 TABLET ORAL at 23:40

## 2021-02-22 RX ADMIN — LATANOPROST 1 DROP(S): 0.05 SOLUTION/ DROPS OPHTHALMIC; TOPICAL at 23:40

## 2021-02-22 RX ADMIN — Medication 1 DROP(S): at 10:37

## 2021-02-22 RX ADMIN — HEPARIN SODIUM 5000 UNIT(S): 5000 INJECTION INTRAVENOUS; SUBCUTANEOUS at 05:22

## 2021-02-22 RX ADMIN — Medication 200 MILLIGRAM(S): at 23:40

## 2021-02-22 RX ADMIN — ATORVASTATIN CALCIUM 80 MILLIGRAM(S): 80 TABLET, FILM COATED ORAL at 23:40

## 2021-02-22 RX ADMIN — Medication 1 DROP(S): at 17:50

## 2021-02-22 RX ADMIN — ONDANSETRON 4 MILLIGRAM(S): 8 TABLET, FILM COATED ORAL at 23:39

## 2021-02-22 RX ADMIN — Medication 667 MILLIGRAM(S): at 17:50

## 2021-02-22 RX ADMIN — Medication 667 MILLIGRAM(S): at 10:37

## 2021-02-22 NOTE — CHART NOTE - NSCHARTNOTEFT_GEN_A_CORE
Patient schedule for MRA/MRI   Premedicated with Ativan 1 mg prior to going to MRI/MRA  Notified by RN that MRI called upon patient arrival that patient refused study stating he requested "full sedation with anesthesia" and refused studies to be done  Pt argumentative with MRI staff   RN directed patient to  for session   Notified Dr Mac of incident       Charley Vang NP  Pager [phone]

## 2021-02-23 LAB
ANION GAP SERPL CALC-SCNC: 16 MMOL/L — HIGH (ref 7–14)
BUN SERPL-MCNC: 37 MG/DL — HIGH (ref 7–23)
CALCIUM SERPL-MCNC: 9.6 MG/DL — SIGNIFICANT CHANGE UP (ref 8.4–10.5)
CHLORIDE SERPL-SCNC: 88 MMOL/L — LOW (ref 98–107)
CO2 SERPL-SCNC: 29 MMOL/L — SIGNIFICANT CHANGE UP (ref 22–31)
CREAT SERPL-MCNC: 4.34 MG/DL — HIGH (ref 0.5–1.3)
GLUCOSE SERPL-MCNC: 106 MG/DL — HIGH (ref 70–99)
HBV CORE AB SER-ACNC: SIGNIFICANT CHANGE UP
HBV SURFACE AB SER-ACNC: 26.2 MIU/ML — SIGNIFICANT CHANGE UP
HCT VFR BLD CALC: 41.8 % — SIGNIFICANT CHANGE UP (ref 39–50)
HCV AB S/CO SERPL IA: 0.1 S/CO — SIGNIFICANT CHANGE UP (ref 0–0.99)
HCV AB SERPL-IMP: SIGNIFICANT CHANGE UP
HGB BLD-MCNC: 12.8 G/DL — LOW (ref 13–17)
MAGNESIUM SERPL-MCNC: 2.5 MG/DL — SIGNIFICANT CHANGE UP (ref 1.6–2.6)
MCHC RBC-ENTMCNC: 27.3 PG — SIGNIFICANT CHANGE UP (ref 27–34)
MCHC RBC-ENTMCNC: 30.6 GM/DL — LOW (ref 32–36)
MCV RBC AUTO: 89.1 FL — SIGNIFICANT CHANGE UP (ref 80–100)
NRBC # BLD: 0 /100 WBCS — SIGNIFICANT CHANGE UP
NRBC # FLD: 0 K/UL — SIGNIFICANT CHANGE UP
PHOSPHATE SERPL-MCNC: 4.2 MG/DL — SIGNIFICANT CHANGE UP (ref 2.5–4.5)
PLATELET # BLD AUTO: 206 K/UL — SIGNIFICANT CHANGE UP (ref 150–400)
POTASSIUM SERPL-MCNC: 4.9 MMOL/L — SIGNIFICANT CHANGE UP (ref 3.5–5.3)
POTASSIUM SERPL-SCNC: 4.9 MMOL/L — SIGNIFICANT CHANGE UP (ref 3.5–5.3)
RBC # BLD: 4.69 M/UL — SIGNIFICANT CHANGE UP (ref 4.2–5.8)
RBC # FLD: 14.9 % — HIGH (ref 10.3–14.5)
SODIUM SERPL-SCNC: 133 MMOL/L — LOW (ref 135–145)
WBC # BLD: 6.48 K/UL — SIGNIFICANT CHANGE UP (ref 3.8–10.5)
WBC # FLD AUTO: 6.48 K/UL — SIGNIFICANT CHANGE UP (ref 3.8–10.5)

## 2021-02-23 PROCEDURE — 93306 TTE W/DOPPLER COMPLETE: CPT | Mod: 26

## 2021-02-23 PROCEDURE — 74018 RADEX ABDOMEN 1 VIEW: CPT | Mod: 26

## 2021-02-23 PROCEDURE — 70498 CT ANGIOGRAPHY NECK: CPT | Mod: 26

## 2021-02-23 PROCEDURE — 70496 CT ANGIOGRAPHY HEAD: CPT | Mod: 26

## 2021-02-23 RX ORDER — POLYETHYLENE GLYCOL 3350 17 G/17G
17 POWDER, FOR SOLUTION ORAL
Refills: 0 | Status: COMPLETED | OUTPATIENT
Start: 2021-02-23 | End: 2021-02-25

## 2021-02-23 RX ORDER — POLYETHYLENE GLYCOL 3350 17 G/17G
17 POWDER, FOR SOLUTION ORAL DAILY
Refills: 0 | Status: DISCONTINUED | OUTPATIENT
Start: 2021-02-23 | End: 2021-02-23

## 2021-02-23 RX ORDER — ONDANSETRON 8 MG/1
4 TABLET, FILM COATED ORAL ONCE
Refills: 0 | Status: COMPLETED | OUTPATIENT
Start: 2021-02-23 | End: 2021-02-23

## 2021-02-23 RX ORDER — PANTOPRAZOLE SODIUM 20 MG/1
40 TABLET, DELAYED RELEASE ORAL DAILY
Refills: 0 | Status: DISCONTINUED | OUTPATIENT
Start: 2021-02-23 | End: 2021-02-26

## 2021-02-23 RX ADMIN — HEPARIN SODIUM 5000 UNIT(S): 5000 INJECTION INTRAVENOUS; SUBCUTANEOUS at 07:05

## 2021-02-23 RX ADMIN — LATANOPROST 1 DROP(S): 0.05 SOLUTION/ DROPS OPHTHALMIC; TOPICAL at 22:04

## 2021-02-23 RX ADMIN — Medication 667 MILLIGRAM(S): at 12:37

## 2021-02-23 RX ADMIN — ONDANSETRON 4 MILLIGRAM(S): 8 TABLET, FILM COATED ORAL at 08:02

## 2021-02-23 RX ADMIN — Medication 1 DROP(S): at 18:14

## 2021-02-23 RX ADMIN — Medication 200 MILLIGRAM(S): at 12:37

## 2021-02-23 RX ADMIN — Medication 81 MILLIGRAM(S): at 12:35

## 2021-02-23 RX ADMIN — SENNA PLUS 2 TABLET(S): 8.6 TABLET ORAL at 22:04

## 2021-02-23 RX ADMIN — Medication 200 MILLIGRAM(S): at 22:03

## 2021-02-23 RX ADMIN — ONDANSETRON 4 MILLIGRAM(S): 8 TABLET, FILM COATED ORAL at 16:33

## 2021-02-23 RX ADMIN — POLYETHYLENE GLYCOL 3350 17 GRAM(S): 17 POWDER, FOR SOLUTION ORAL at 07:05

## 2021-02-23 RX ADMIN — PANTOPRAZOLE SODIUM 40 MILLIGRAM(S): 20 TABLET, DELAYED RELEASE ORAL at 12:37

## 2021-02-23 RX ADMIN — ATORVASTATIN CALCIUM 80 MILLIGRAM(S): 80 TABLET, FILM COATED ORAL at 22:03

## 2021-02-23 RX ADMIN — Medication 1 ENEMA: at 18:13

## 2021-02-23 RX ADMIN — Medication 60 MILLIGRAM(S): at 22:03

## 2021-02-23 RX ADMIN — Medication 1 DROP(S): at 08:04

## 2021-02-23 RX ADMIN — Medication 200 MILLIGRAM(S): at 07:05

## 2021-02-23 RX ADMIN — Medication 667 MILLIGRAM(S): at 18:14

## 2021-02-23 RX ADMIN — POLYETHYLENE GLYCOL 3350 17 GRAM(S): 17 POWDER, FOR SOLUTION ORAL at 18:12

## 2021-02-23 RX ADMIN — HEPARIN SODIUM 5000 UNIT(S): 5000 INJECTION INTRAVENOUS; SUBCUTANEOUS at 18:14

## 2021-02-23 NOTE — PROVIDER CONTACT NOTE (OTHER) - BACKGROUND
pt admitted to medicine for stroke
Pt has hx of CVA, HTN, ESRD, R BKA, L AKA and DM. Came in to ED with L hand weakness and has been consistently hypertensive. As per team, initiated permissive HTN on 2/20. To receive dialysis on 2/22
pt has MRA Head w/o contrast and MRA neck w/wo contrast ordered. pt stated that he needed to be premedicated, given 1mg of ativan before leaving unit for MRI.
66 year old male with admitting diagnosis of cerebral infarction and PMH of kidney disease, HTN and DM
Pt admitted with cerebral infarcation, h/o of CVA, and ESRD
Pt has hx of CVA, HTN, L AKA, and R BKA, and ESRD

## 2021-02-23 NOTE — PROVIDER CONTACT NOTE (OTHER) - ACTION/TREATMENT ORDERED:
Contacted and notified Eusebio King; Administering 2200 BP medications and will recheck BP
BECKI Kaplan notified, as per PA, patient in permissive HTN, in which allowing hypertension for 48 hours from admission to r/o stroke. Continue to monitor patient and assess for signs of symptoms.
BECKI Whitehead made aware that patient is refusing to have MRI taken.
As per BECKI Kaplan, abdominal xray ordered and will follow up with results. Gingerale provided, as per patient zofran did not help him earlier in the night
permissive hypertension, treat if SBP over 220
As per BECKI Kaplan patient is past permissive HTN state and give STAT nifedipine 60mg and recheck BP in 1 hour

## 2021-02-23 NOTE — CHART NOTE - NSCHARTNOTEFT_GEN_A_CORE
Pt needs MRI/MRA Head & Neck to r/o CVA.  Refused prior and requesting full sedation with anesthesia.  Spoke with Dr. Way from Anesthesia ([id number]) who states can coordinate for Friday 2/26.  Spoke with Neurology resident ([id number]) and CTA Head & Neck is an option if cannot get MRI as long as done with dialysis to follow.  Discussed with Dr. Mac and will get CTA Head and Neck.  If imaging not sufficient, then will proceed with coordinating MRI for Friday.  If imaging sufficient will cancel MRI.  Spoke with Nephrology, Dr. Karimi, and javan for CTA as long as pt has dialysis within 24hrs of scan.  Will order to be done today.

## 2021-02-23 NOTE — PROVIDER CONTACT NOTE (OTHER) - DATE AND TIME:
20-Feb-2021 21:03
21-Feb-2021 22:50
22-Feb-2021 02:38
22-Feb-2021 16:00
23-Feb-2021 22:00
23-Feb-2021 06:25

## 2021-02-23 NOTE — CHART NOTE - NSCHARTNOTEFT_GEN_A_CORE
RN notified me that patient with vomiting today.  Zofran IV given x 2 doses today.  Also have nausea/vomiting overnight.  Abdominal x-ray showed Nonobstructive bowel gas pattern without evidence of free air.  Moderate stool throughout the right ed-abdomen.    RN and pt unclear about last bowel movement.  Pt on lactulose PO and senna.  Will increase Miralax to BID standing x 2 days.  Will monitor. RN notified me that patient with vomiting today.  Zofran IV given x 2 doses today.  Also have nausea/vomiting overnight.  Abdominal x-ray showed Nonobstructive bowel gas pattern without evidence of free air.  Moderate stool throughout the right ed-abdomen.    RN and pt unclear about last bowel movement.  Pt on lactulose PO and senna.  Will increase Miralax to BID standing x 2 days.   Will also order enema as per Dr. Mac  Will monitor.

## 2021-02-23 NOTE — CHART NOTE - NSCHARTNOTEFT_GEN_A_CORE
Pt seen and assessed for N/V. At time of interview pt denies abd pain, CP, HA, dizziness, palpitations. Pt endorsed gassing gas but no BM. Had 2 episodes non-bloody vomiting. Abd soft nontender nondistended, normoactive BS.     ADDENDUM: 6:00AM     Pt seen again at beside for possible coffee ground emesis. Pt does not remember what he had for to eat to possible explain color of emesis. Pt now endorsing abd pain. States he needs to "go". Abd x-ray ordered. Monitor H+H, Protonix started, Miralax ordered. Pt already on senna. Zofran prn for nausea.     Vital Signs Last 24 Hrs  T(C): 36.4 (22 Feb 2021 23:30), Max: 37.1 (22 Feb 2021 14:44)  T(F): 97.6 (22 Feb 2021 23:30), Max: 98.7 (22 Feb 2021 14:44)  HR: 73 (22 Feb 2021 23:30) (53 - 73)  BP: 182/73 (22 Feb 2021 23:30) (144/55 - 182/73)  RR: 18 (22 Feb 2021 23:30) (16 - 18)  SpO2: 100% (22 Feb 2021 23:30) (100% - 100%)

## 2021-02-23 NOTE — PROVIDER CONTACT NOTE (OTHER) - ASSESSMENT
/73 HR 60 O2 98% RR 18 Temp 97.9. Pt denies SOB, headache, lightheadedness or numbness/tingling
Pt VSS, however complaining of belly pain wanting to pass a bowel movement. Abdomen is distended, however bowel sounds present.
/76, HR 60, RR 18, O2 98%, Temp 97.9. Pt denies chest pain, SOB, headache or lightheadedness. Pt also denies numbness or tingling.
Patient is asymptomatic with no reports of chest pain, palpitations or shortness of breath.
pt denies dizziness lightheadedness nausea vomiting vision changes

## 2021-02-23 NOTE — PROVIDER CONTACT NOTE (OTHER) - SITUATION
pt had scheduled MRI ordered, once down at MRI pt refused MRI stating that "he needs to be put to sleep by aesthesia" MRI cancelled
Pt blood pressure systolically staying in 200's
pt hypertensive 206/68
/79
Pt blood pressure 213/76
Pt projectile vomited with brown streaks

## 2021-02-23 NOTE — PROVIDER CONTACT NOTE (OTHER) - RECOMMENDATIONS
BECKI Kaplan notified, as per PA, patient in permissive HTN, in which allowing hypertension for 48 hours from admission to r/o stroke. Continue to monitor patient and assess for signs of symptoms.
As per BECKI Kaplan patient is past permissive HTN state and give STAT nifedipine 60mg and recheck BP in 1 hour
As per BECKI Kaplan, abdominal xray ordered and will follow up with results
Contacted and notified Eusebio King; Administering 2200 BP medications and will recheck BP

## 2021-02-23 NOTE — PROVIDER CONTACT NOTE (OTHER) - REASON
/79
Pt projectile vomited with brown streaks
Pt blood pressure systolically staying in 200's
hypertension
Pt blood pressure 213/76
pt refusing MRI

## 2021-02-24 LAB
ANION GAP SERPL CALC-SCNC: 16 MMOL/L — HIGH (ref 7–14)
BUN SERPL-MCNC: 60 MG/DL — HIGH (ref 7–23)
CALCIUM SERPL-MCNC: 8.6 MG/DL — SIGNIFICANT CHANGE UP (ref 8.4–10.5)
CHLORIDE SERPL-SCNC: 88 MMOL/L — LOW (ref 98–107)
CO2 SERPL-SCNC: 28 MMOL/L — SIGNIFICANT CHANGE UP (ref 22–31)
CREAT SERPL-MCNC: 6.02 MG/DL — HIGH (ref 0.5–1.3)
GLUCOSE SERPL-MCNC: 73 MG/DL — SIGNIFICANT CHANGE UP (ref 70–99)
HCT VFR BLD CALC: 35.2 % — LOW (ref 39–50)
HGB BLD-MCNC: 11.2 G/DL — LOW (ref 13–17)
MAGNESIUM SERPL-MCNC: 2.3 MG/DL — SIGNIFICANT CHANGE UP (ref 1.6–2.6)
MCHC RBC-ENTMCNC: 27.7 PG — SIGNIFICANT CHANGE UP (ref 27–34)
MCHC RBC-ENTMCNC: 31.8 GM/DL — LOW (ref 32–36)
MCV RBC AUTO: 86.9 FL — SIGNIFICANT CHANGE UP (ref 80–100)
NRBC # BLD: 0 /100 WBCS — SIGNIFICANT CHANGE UP
NRBC # FLD: 0 K/UL — SIGNIFICANT CHANGE UP
PHOSPHATE SERPL-MCNC: 8 MG/DL — HIGH (ref 2.5–4.5)
PLATELET # BLD AUTO: 176 K/UL — SIGNIFICANT CHANGE UP (ref 150–400)
POTASSIUM SERPL-MCNC: 4.4 MMOL/L — SIGNIFICANT CHANGE UP (ref 3.5–5.3)
POTASSIUM SERPL-SCNC: 4.4 MMOL/L — SIGNIFICANT CHANGE UP (ref 3.5–5.3)
RBC # BLD: 4.05 M/UL — LOW (ref 4.2–5.8)
RBC # FLD: 15.2 % — HIGH (ref 10.3–14.5)
SODIUM SERPL-SCNC: 132 MMOL/L — LOW (ref 135–145)
WBC # BLD: 7.51 K/UL — SIGNIFICANT CHANGE UP (ref 3.8–10.5)
WBC # FLD AUTO: 7.51 K/UL — SIGNIFICANT CHANGE UP (ref 3.8–10.5)

## 2021-02-24 PROCEDURE — 99222 1ST HOSP IP/OBS MODERATE 55: CPT

## 2021-02-24 RX ORDER — ONDANSETRON 8 MG/1
4 TABLET, FILM COATED ORAL ONCE
Refills: 0 | Status: COMPLETED | OUTPATIENT
Start: 2021-02-24 | End: 2021-02-24

## 2021-02-24 RX ADMIN — Medication 1 DROP(S): at 09:42

## 2021-02-24 RX ADMIN — Medication 667 MILLIGRAM(S): at 09:42

## 2021-02-24 RX ADMIN — Medication 200 MILLIGRAM(S): at 06:08

## 2021-02-24 RX ADMIN — ATORVASTATIN CALCIUM 80 MILLIGRAM(S): 80 TABLET, FILM COATED ORAL at 21:40

## 2021-02-24 RX ADMIN — ONDANSETRON 4 MILLIGRAM(S): 8 TABLET, FILM COATED ORAL at 09:54

## 2021-02-24 RX ADMIN — POLYETHYLENE GLYCOL 3350 17 GRAM(S): 17 POWDER, FOR SOLUTION ORAL at 18:02

## 2021-02-24 RX ADMIN — LATANOPROST 1 DROP(S): 0.05 SOLUTION/ DROPS OPHTHALMIC; TOPICAL at 21:40

## 2021-02-24 RX ADMIN — POLYETHYLENE GLYCOL 3350 17 GRAM(S): 17 POWDER, FOR SOLUTION ORAL at 06:08

## 2021-02-24 RX ADMIN — HEPARIN SODIUM 5000 UNIT(S): 5000 INJECTION INTRAVENOUS; SUBCUTANEOUS at 06:08

## 2021-02-24 RX ADMIN — PANTOPRAZOLE SODIUM 40 MILLIGRAM(S): 20 TABLET, DELAYED RELEASE ORAL at 18:02

## 2021-02-24 RX ADMIN — Medication 667 MILLIGRAM(S): at 18:02

## 2021-02-24 RX ADMIN — Medication 200 MILLIGRAM(S): at 21:41

## 2021-02-24 RX ADMIN — Medication 0.5 MILLIGRAM(S): at 15:54

## 2021-02-24 RX ADMIN — Medication 1 DROP(S): at 21:40

## 2021-02-24 RX ADMIN — HEPARIN SODIUM 5000 UNIT(S): 5000 INJECTION INTRAVENOUS; SUBCUTANEOUS at 18:02

## 2021-02-24 RX ADMIN — Medication 60 MILLIGRAM(S): at 21:40

## 2021-02-24 NOTE — CONSULT NOTE ADULT - SUBJECTIVE AND OBJECTIVE BOX
CHIEF COMPLAINT: Called to evaluate patient with acute onset LUE hypoesthetic hemiparesis due to HD related acute effect vs. new R. thalamocapsular/ R. cortical infarct for possible ILR implantation    HISTORY OF PRESENT ILLNESS:  66y R-handed M with h/o ESRD on HD (M/W/F), HTN, DM, MRSA colonization, R. BKA, L. AKA and multiple chronic embolic infarcts (B/L occipital, B/L cerebellar, L. temporal) with residual visual impairment p/w acute onset left hypoesthetic hemiparesis after most recent HD session, improving but not back to baseline. At baseline, patient is non-ambulatory 2/2 B/L amputations and lives at [facility]. Patient reports waking up with reduced sensation in left LE which he had in past, but after HD on 02/19 began having L. arm involvement which was new. Patient is a poor historian due to inattention. CT scan of the head reveals multiple chronic infarcts including both occipital lobes and cerebellar hemispheres with small vessel ischemic changes. Echocardiogram shows moderately dilated left atrium and normal LV systolic function. Telemetry revealed sinus rhythm and occ. junctional rhythm with HR 50s-60s. Patient is on Labetalol 200mg TID.     PAST MEDICAL & SURGICAL HISTORY:  Kidney disease    HTN (hypertension)    DM (diabetes mellitus)    Wound  (chronic wounds to left foot and leg)    MRSA (methicillin resistant Staphylococcus aureus) colonization  (hx/o MRSA growth from wound culture)    CKD (chronic kidney disease)    Below knee amputation status, right    HTN (hypertension)    DM (diabetes mellitus)    Amputated right leg  below knee    Amputated left leg  above knee    H/O peripheral artery bypass  left fem to below-knee pop with RSVG    S/P BKA (below knee amputation) unilateral, right    PREVIOUS DIAGNOSTIC TESTING:     Echocardiogram:   from: TTE with Doppler (w/Cont) (02.23.21 @ 08:49)   DIMENSIONS:  Dimensions:     Normal Values:  LA:     3.7 cm    2.0 - 4.0 cm  Ao:     3.5 cm    2.0 - 3.8 cm  SEPTUM: 1.2 cm    0.6 - 1.2 cm  PWT:    1.2 cm    0.6 - 1.1 cm  LVIDd:  5.2 cm    3.0 - 5.6 cm  LVIDs:  3.3 cm    1.8 - 4.0 cm  Derived Variables:  LVMI: 126 g/m2  RWT: 0.46  Fractional short: 37 %  Ejection Fraction (Teicholtz): 66 %  ------------------------------------------------------------------------  OBSERVATIONS:  Mitral Valve: Mitral annular calcification, otherwise  normal mitral valve. Minimal mitral regurgitation.  Aortic Root: Normal aortic root.  Aortic Valve: Aortic valve leaflet morphology not well  visualized.   Peak transaortic valve gradient equals 23 mm Hg, mean  transaortic valve gradient equals 13 mm Hg, consistent with  mild aortic stenosis.  Left Atrium: Moderately dilated left atrium.  LA volume  index = 42 cc/m2.  Left Ventricle: Endocardium not well visualized; grossly  normal left ventricular systolic function. Moderate  concentric left ventricular hypertrophy. Moderate diastolic  dysfunction (Stage II).  Right Heart: Normal right atrium. The right ventricle is  not well visualized; grossly normal right ventricular  systolic function. Normal tricuspid valve. Mild tricuspid  regurgitation. Normal pulmonic valve. Minimal pulmonic  regurgitation.  Pericardium/PleuraNormal pericardium with no pericardial  effusion.  ------------------------------------------------------------------------  CONCLUSIONS:  1. Mitral annular calcification, otherwise normal mitral  valve. Minimal mitral regurgitation.  2. Moderately dilated left atrium.  LA volume index = 42  cc/m2.  3. Moderate concentric left ventricular hypertrophy.  4. Endocardium not well visualized; grossly normal left  ventricular systolic function.  5. Moderate diastolic dysfunction (Stage II).  6. The right ventricle is not well visualized; grossly  normal right ventricular systolic function.  7. A bubble study was performed with the intravenous  injection of agitated saline contrast and was inconclusive  regarding the presence of an interatrial shunt.  No obvious cardiac source of embolus was identified on this  transthoracic study.  If clinical suspicion is high,  consider [place] for further evaluation.    MEDICATIONS:  aspirin enteric coated 81 milliGRAM(s) Oral daily  heparin   Injectable 5000 Unit(s) SubCutaneous every 12 hours  labetalol 200 milliGRAM(s) Oral <User Schedule>  NIFEdipine XL 60 milliGRAM(s) Oral at bedtime  acetaminophen   Tablet .. 650 milliGRAM(s) Oral every 6 hours PRN  lactulose Syrup 20 Gram(s) Oral at bedtime  pantoprazole  Injectable 40 milliGRAM(s) IV Push daily  polyethylene glycol 3350 17 Gram(s) Oral two times a day  senna 2 Tablet(s) Oral at bedtime  atorvastatin 80 milliGRAM(s) Oral at bedtime  dextrose 40% Gel 15 Gram(s) Oral once  dextrose 50% Injectable 25 Gram(s) IV Push once  dextrose 50% Injectable 12.5 Gram(s) IV Push once  dextrose 50% Injectable 25 Gram(s) IV Push once  glucagon  Injectable 1 milliGRAM(s) IntraMuscular once  insulin lispro (ADMELOG) corrective regimen sliding scale   SubCutaneous three times a day before meals    calcium acetate 667 milliGRAM(s) Oral three times a day with meals  dextrose 5%. 1000 milliLiter(s) IV Continuous <Continuous>  dextrose 5%. 1000 milliLiter(s) IV Continuous <Continuous>  latanoprost 0.005% Ophthalmic Solution 1 Drop(s) Both EYES at bedtime  prednisoLONE acetate 1% Suspension 1 Drop(s) Left EYE two times a day      FAMILY HISTORY:  Family history of diabetes mellitus    SOCIAL HISTORY:    [ ]Smoking:   [ ]Alcohol:  [ ]Drugs:    Allergies    No Known Allergies    Intolerances      REVIEW OF SYSTEMS:  CONSTITUTIONAL: No fever, weight loss, or fatigue  EYES: No eye pain, visual disturbances, or discharge  ENMT:  No difficulty hearing, tinnitus, vertigo; No sinus or throat pain  NECK: No pain or stiffness  RESPIRATORY: No cough, wheezing, chills or hemoptysis; No Shortness of Breath  CARDIOVASCULAR: No chest pain, palpitations, passing out, dizziness, or leg swelling  GASTROINTESTINAL: No abdominal or epigastric pain. No nausea, vomiting, or hematemesis; No diarrhea or constipation. No melena or hematochezia.  GENITOURINARY: No dysuria, frequency, hematuria, or incontinence  NEUROLOGICAL: No headaches, memory loss, loss of strength, numbness, or tremors  SKIN: No itching, burning, rashes, or lesions   LYMPH Nodes: No enlarged glands  ENDOCRINE: No heat or cold intolerance; No hair loss  MUSCULOSKELETAL: No joint pain or swelling; No muscle, back, or extremity pain  PSYCHIATRIC: No depression, anxiety, mood swings, or difficulty sleeping  HEME/LYMPH: No easy bruising, or bleeding gums  ALLERY AND IMMUNOLOGIC: No hives or eczema	    [X] All others negative	  [ ] Unable to obtain    PHYSICAL EXAM:  T(C): 36.6 (02-24-21 @ 14:09), Max: 36.7 (02-23-21 @ 21:58)  HR: 57 (02-24-21 @ 14:09) (57 - 66)  BP: 126/64 (02-24-21 @ 14:09) (125/57 - 183/79)  RR: 18 (02-24-21 @ 14:09) (16 - 18)  SpO2: 96% (02-24-21 @ 06:01) (96% - 99%)  Wt(kg): --  I&O's Summary    24 Feb 2021 07:01  -  24 Feb 2021 14:26  --------------------------------------------------------  IN: 500 mL / OUT: 2000 mL / NET: -1500 mL        Appearance: Normal	  HEENT:   Normal oral mucosa, PERRL, EOMI	  Lymphatic: No lymphadenopathy  Cardiovascular: Normal S1 S2, No JVD, No murmurs, No edema  Respiratory: Lungs clear to auscultation	  Psychiatry: A & O x 3, Mood & affect appropriate  Gastrointestinal:  Soft, Non-tender, + BS	  Skin: No rashes, No ecchymoses, No cyanosis	  Neurologic: Non-focal  Extremities:  R. BKA, L. AKA   Vascular: Peripheral pulses palpable 2+ bilaterally    TELEMETRY: Sinus rhythm and occ. junctional rhythm with HR 50s-60s   ECG:  	  RADIOLOGY:  OTHER: 	  	  LABS:	 	    CARDIAC MARKERS:                       11.2   7.51  )-----------( 176      ( 24 Feb 2021 06:27 )             35.2     02-24    132<L>  |  88<L>  |  60<H>  ----------------------------<  73  4.4   |  28  |  6.02<H>    Ca    8.6      24 Feb 2021 06:27  Phos  8.0     02-24  Mg     2.3     02-24        ASSESSMENT/PLAN: 	There is no telemetry evidence of atrial arrhythmias; nor is there a clear pacing indication at this time.  Per CRYSTAL-AF, patient will benefit from prolonged monitoring for detection of AF/PAF as cause of potential cardioembolic source.  ILR implantation for prolonged monitoring for detection of suspected, asymptomatic AF/PAF advised as 2D TTE is without DARWIN clot. Risks, benefits, and alternatives discussed with   -	Continue telemetry         CHIEF COMPLAINT: Called to evaluate patient with acute onset LUE hypoesthetic hemiparesis due to HD related acute effect vs. new R. thalamocapsular/ R. cortical infarct for possible ILR implantation    HISTORY OF PRESENT ILLNESS:  66y R-handed M with h/o ESRD on HD (M/W/F), HTN, DM, MRSA colonization, R. BKA, L. AKA and multiple chronic embolic infarcts (B/L occipital, B/L cerebellar, L. temporal) with residual visual impairment p/w acute onset left hypoesthetic hemiparesis after most recent HD session, improving but not back to baseline. At baseline, patient is non-ambulatory 2/2 B/L amputations and lives at [facility]. Patient reports waking up with reduced sensation in left LE which he had in past, but after HD on 02/19 began having L. arm involvement which was new. Patient is a poor historian due to inattention. CT scan of the head reveals multiple chronic infarcts including both occipital lobes and cerebellar hemispheres with small vessel ischemic changes. Patient's symptoms improved since admission. Echocardiogram shows moderately dilated left atrium and normal LV systolic function. Telemetry revealed sinus rhythm with HR 50s-60s. Patient is on Labetalol 200mg TID.     PAST MEDICAL & SURGICAL HISTORY:  Kidney disease    HTN (hypertension)    DM (diabetes mellitus)    Wound  (chronic wounds to left foot and leg)    MRSA (methicillin resistant Staphylococcus aureus) colonization  (hx/o MRSA growth from wound culture)    CKD (chronic kidney disease)    Below knee amputation status, right    HTN (hypertension)    DM (diabetes mellitus)    Amputated right leg  below knee    Amputated left leg  above knee    H/O peripheral artery bypass  left fem to below-knee pop with RSVG    S/P BKA (below knee amputation) unilateral, right    PREVIOUS DIAGNOSTIC TESTING:     Echocardiogram:   from: TTE with Doppler (w/Cont) (02.23.21 @ 08:49)   DIMENSIONS:  Dimensions:     Normal Values:  LA:     3.7 cm    2.0 - 4.0 cm  Ao:     3.5 cm    2.0 - 3.8 cm  SEPTUM: 1.2 cm    0.6 - 1.2 cm  PWT:    1.2 cm    0.6 - 1.1 cm  LVIDd:  5.2 cm    3.0 - 5.6 cm  LVIDs:  3.3 cm    1.8 - 4.0 cm  Derived Variables:  LVMI: 126 g/m2  RWT: 0.46  Fractional short: 37 %  Ejection Fraction (Teicholtz): 66 %  ------------------------------------------------------------------------  OBSERVATIONS:  Mitral Valve: Mitral annular calcification, otherwise  normal mitral valve. Minimal mitral regurgitation.  Aortic Root: Normal aortic root.  Aortic Valve: Aortic valve leaflet morphology not well  visualized.   Peak transaortic valve gradient equals 23 mm Hg, mean  transaortic valve gradient equals 13 mm Hg, consistent with  mild aortic stenosis.  Left Atrium: Moderately dilated left atrium.  LA volume  index = 42 cc/m2.  Left Ventricle: Endocardium not well visualized; grossly  normal left ventricular systolic function. Moderate  concentric left ventricular hypertrophy. Moderate diastolic  dysfunction (Stage II).  Right Heart: Normal right atrium. The right ventricle is  not well visualized; grossly normal right ventricular  systolic function. Normal tricuspid valve. Mild tricuspid  regurgitation. Normal pulmonic valve. Minimal pulmonic  regurgitation.  Pericardium/PleuraNormal pericardium with no pericardial  effusion.  ------------------------------------------------------------------------  CONCLUSIONS:  1. Mitral annular calcification, otherwise normal mitral  valve. Minimal mitral regurgitation.  2. Moderately dilated left atrium.  LA volume index = 42  cc/m2.  3. Moderate concentric left ventricular hypertrophy.  4. Endocardium not well visualized; grossly normal left  ventricular systolic function.  5. Moderate diastolic dysfunction (Stage II).  6. The right ventricle is not well visualized; grossly  normal right ventricular systolic function.  7. A bubble study was performed with the intravenous  injection of agitated saline contrast and was inconclusive  regarding the presence of an interatrial shunt.  No obvious cardiac source of embolus was identified on this  transthoracic study.  If clinical suspicion is high,  consider [place] for further evaluation.    MEDICATIONS:  aspirin enteric coated 81 milliGRAM(s) Oral daily  heparin   Injectable 5000 Unit(s) SubCutaneous every 12 hours  labetalol 200 milliGRAM(s) Oral <User Schedule>  NIFEdipine XL 60 milliGRAM(s) Oral at bedtime  acetaminophen   Tablet .. 650 milliGRAM(s) Oral every 6 hours PRN  lactulose Syrup 20 Gram(s) Oral at bedtime  pantoprazole  Injectable 40 milliGRAM(s) IV Push daily  polyethylene glycol 3350 17 Gram(s) Oral two times a day  senna 2 Tablet(s) Oral at bedtime  atorvastatin 80 milliGRAM(s) Oral at bedtime  dextrose 40% Gel 15 Gram(s) Oral once  dextrose 50% Injectable 25 Gram(s) IV Push once  dextrose 50% Injectable 12.5 Gram(s) IV Push once  dextrose 50% Injectable 25 Gram(s) IV Push once  glucagon  Injectable 1 milliGRAM(s) IntraMuscular once  insulin lispro (ADMELOG) corrective regimen sliding scale   SubCutaneous three times a day before meals    calcium acetate 667 milliGRAM(s) Oral three times a day with meals  dextrose 5%. 1000 milliLiter(s) IV Continuous <Continuous>  dextrose 5%. 1000 milliLiter(s) IV Continuous <Continuous>  latanoprost 0.005% Ophthalmic Solution 1 Drop(s) Both EYES at bedtime  prednisoLONE acetate 1% Suspension 1 Drop(s) Left EYE two times a day      FAMILY HISTORY:  Family history of diabetes mellitus    SOCIAL HISTORY:    [ ]Smoking:   [ ]Alcohol:  [ ]Drugs:    Allergies    No Known Allergies    Intolerances      REVIEW OF SYSTEMS:  CONSTITUTIONAL: No fever, weight loss, or fatigue  EYES: No eye pain, visual disturbances, or discharge  ENMT:  No difficulty hearing, tinnitus, vertigo; No sinus or throat pain  NECK: No pain or stiffness  RESPIRATORY: No cough, wheezing, chills or hemoptysis; No Shortness of Breath  CARDIOVASCULAR: No chest pain, palpitations, passing out, dizziness, or leg swelling  GASTROINTESTINAL: No abdominal or epigastric pain. No hematemesis; No diarrhea or constipation. No melena or hematochezia; + nausea, + vomiting  GENITOURINARY: No dysuria, frequency, hematuria, or incontinence  NEUROLOGICAL: No headaches, memory loss, loss of strength, numbness, or tremors  SKIN: No itching, burning, rashes, or lesions   LYMPH Nodes: No enlarged glands  ENDOCRINE: No heat or cold intolerance; No hair loss  MUSCULOSKELETAL: No joint pain or swelling; No muscle, back, or extremity pain  PSYCHIATRIC: No depression, anxiety, mood swings, or difficulty sleeping  HEME/LYMPH: No easy bruising, or bleeding gums  ALLERY AND IMMUNOLOGIC: No hives or eczema	    [X] All others negative	  [ ] Unable to obtain    PHYSICAL EXAM:  T(C): 36.6 (02-24-21 @ 14:09), Max: 36.7 (02-23-21 @ 21:58)  HR: 57 (02-24-21 @ 14:09) (57 - 66)  BP: 126/64 (02-24-21 @ 14:09) (125/57 - 183/79)  RR: 18 (02-24-21 @ 14:09) (16 - 18)  SpO2: 96% (02-24-21 @ 06:01) (96% - 99%)  Wt(kg): --  I&O's Summary    24 Feb 2021 07:01  -  24 Feb 2021 14:26  --------------------------------------------------------  IN: 500 mL / OUT: 2000 mL / NET: -1500 mL        Appearance: Normal	  HEENT:   Normal oral mucosa, PERRL, EOMI	  Cardiovascular: Normal S1 S2, No JVD, No murmurs, No edema  Respiratory: Lungs clear to auscultation	  Psychiatry: A & O x 3, Mood & affect appropriate  Gastrointestinal:  Soft, Non-tender, + BS		  Neurologic: Non-focal  Extremities:  R. BLAKEA, [place]       TELEMETRY: Sinus rhythm with HR 50s-60s; APCs  ECG:  	  RADIOLOGY:  OTHER: 	  	  LABS:	 	    CARDIAC MARKERS:                       11.2   7.51  )-----------( 176      ( 24 Feb 2021 06:27 )             35.2     02-24    132<L>  |  88<L>  |  60<H>  ----------------------------<  73  4.4   |  28  |  6.02<H>    Ca    8.6      24 Feb 2021 06:27  Phos  8.0     02-24  Mg     2.3     02-24        ASSESSMENT/PLAN: 66y R-handed M with h/o ESRD on HD (M/W/F), HTN, DM, MRSA colonization, R. BKA, L. AKA and multiple chronic embolic infarcts (B/L occipital, B/L cerebellar, L. temporal) with residual visual impairment p/w acute onset left hypoesthetic hemiparesis after most recent HD session. CT scan of the head reveals multiple chronic infarcts including both occipital lobes and cerebellar hemispheres with small vessel ischemic changes. EP was called to evaluate for ILR as the patient has moderate L atrial dilation and numerous infarcts with artery occlusions to investigate for possible underlying atrial fibrillation. There is no telemetry evidence of atrial arrhythmias; nor is there a clear pacing indication at this time.  Per CRYSTAL-AF, patient will benefit from prolonged monitoring for detection of AF/PAF as cause of potential cardioembolic source.  ILR implantation for prolonged monitoring for detection of suspected, asymptomatic AF/PAF advised as 2D TTE is without DARWIN clot. Risks, benefits, and alternatives discussed with patient he would like to think about it.  - Continue telemetry  - Continue other management as per primary team  - Possible ILR implantation if patient is agreeable

## 2021-02-24 NOTE — CHART NOTE - NSCHARTNOTEFT_GEN_A_CORE
Patient with intractable hiccups x several days, causing nausea and vomiting.  Zofran did not help. Had BM x 2 overnight - no longer constipated.  Abdominal exam today is benign.  GI consulted (Dr. Prince) - will follow up recommendations.  Discussed case over phone, can try low dose ativan 0.5mg PO x 1 to see if this improves his hiccups.  Above plan and events discussed with Dr. Mac.

## 2021-02-24 NOTE — CHART NOTE - NSCHARTNOTEFT_GEN_A_CORE
Ep consult called for evaluation for ILR as per neurology recommendations.  Case discussed with Dr. Mac.

## 2021-02-25 ENCOUNTER — TRANSCRIPTION ENCOUNTER (OUTPATIENT)
Age: 66
End: 2021-02-25

## 2021-02-25 DIAGNOSIS — K59.00 CONSTIPATION, UNSPECIFIED: ICD-10-CM

## 2021-02-25 DIAGNOSIS — R06.6 HICCOUGH: ICD-10-CM

## 2021-02-25 LAB
ALBUMIN SERPL ELPH-MCNC: 3.8 G/DL — SIGNIFICANT CHANGE UP (ref 3.3–5)
ALP SERPL-CCNC: 106 U/L — SIGNIFICANT CHANGE UP (ref 40–120)
ALT FLD-CCNC: 21 U/L — SIGNIFICANT CHANGE UP (ref 4–41)
ANION GAP SERPL CALC-SCNC: 17 MMOL/L — HIGH (ref 7–14)
AST SERPL-CCNC: 18 U/L — SIGNIFICANT CHANGE UP (ref 4–40)
BILIRUB SERPL-MCNC: 0.3 MG/DL — SIGNIFICANT CHANGE UP (ref 0.2–1.2)
BUN SERPL-MCNC: 45 MG/DL — HIGH (ref 7–23)
CALCIUM SERPL-MCNC: 8.4 MG/DL — SIGNIFICANT CHANGE UP (ref 8.4–10.5)
CHLORIDE SERPL-SCNC: 91 MMOL/L — LOW (ref 98–107)
CO2 SERPL-SCNC: 27 MMOL/L — SIGNIFICANT CHANGE UP (ref 22–31)
CREAT SERPL-MCNC: 5.25 MG/DL — HIGH (ref 0.5–1.3)
GLUCOSE SERPL-MCNC: 87 MG/DL — SIGNIFICANT CHANGE UP (ref 70–99)
HCT VFR BLD CALC: 34.8 % — LOW (ref 39–50)
HGB BLD-MCNC: 10.8 G/DL — LOW (ref 13–17)
MAGNESIUM SERPL-MCNC: 2.1 MG/DL — SIGNIFICANT CHANGE UP (ref 1.6–2.6)
MCHC RBC-ENTMCNC: 27.6 PG — SIGNIFICANT CHANGE UP (ref 27–34)
MCHC RBC-ENTMCNC: 31 GM/DL — LOW (ref 32–36)
MCV RBC AUTO: 88.8 FL — SIGNIFICANT CHANGE UP (ref 80–100)
NRBC # BLD: 0 /100 WBCS — SIGNIFICANT CHANGE UP
NRBC # FLD: 0 K/UL — SIGNIFICANT CHANGE UP
PHOSPHATE SERPL-MCNC: 7.1 MG/DL — HIGH (ref 2.5–4.5)
PLATELET # BLD AUTO: 208 K/UL — SIGNIFICANT CHANGE UP (ref 150–400)
POTASSIUM SERPL-MCNC: 4.3 MMOL/L — SIGNIFICANT CHANGE UP (ref 3.5–5.3)
POTASSIUM SERPL-SCNC: 4.3 MMOL/L — SIGNIFICANT CHANGE UP (ref 3.5–5.3)
PROT SERPL-MCNC: 7.6 G/DL — SIGNIFICANT CHANGE UP (ref 6–8.3)
PTH-INTACT FLD-MCNC: 271 PG/ML — HIGH (ref 15–65)
RBC # BLD: 3.92 M/UL — LOW (ref 4.2–5.8)
RBC # FLD: 15.4 % — HIGH (ref 10.3–14.5)
SODIUM SERPL-SCNC: 135 MMOL/L — SIGNIFICANT CHANGE UP (ref 135–145)
WBC # BLD: 6.23 K/UL — SIGNIFICANT CHANGE UP (ref 3.8–10.5)
WBC # FLD AUTO: 6.23 K/UL — SIGNIFICANT CHANGE UP (ref 3.8–10.5)

## 2021-02-25 RX ADMIN — PANTOPRAZOLE SODIUM 40 MILLIGRAM(S): 20 TABLET, DELAYED RELEASE ORAL at 13:14

## 2021-02-25 RX ADMIN — Medication 200 MILLIGRAM(S): at 05:28

## 2021-02-25 RX ADMIN — HEPARIN SODIUM 5000 UNIT(S): 5000 INJECTION INTRAVENOUS; SUBCUTANEOUS at 05:27

## 2021-02-25 RX ADMIN — ATORVASTATIN CALCIUM 80 MILLIGRAM(S): 80 TABLET, FILM COATED ORAL at 22:49

## 2021-02-25 RX ADMIN — Medication 667 MILLIGRAM(S): at 13:14

## 2021-02-25 RX ADMIN — HEPARIN SODIUM 5000 UNIT(S): 5000 INJECTION INTRAVENOUS; SUBCUTANEOUS at 17:45

## 2021-02-25 RX ADMIN — LATANOPROST 1 DROP(S): 0.05 SOLUTION/ DROPS OPHTHALMIC; TOPICAL at 22:49

## 2021-02-25 RX ADMIN — Medication 1 DROP(S): at 17:45

## 2021-02-25 RX ADMIN — Medication 667 MILLIGRAM(S): at 08:51

## 2021-02-25 RX ADMIN — Medication 1 DROP(S): at 08:51

## 2021-02-25 RX ADMIN — Medication 200 MILLIGRAM(S): at 13:14

## 2021-02-25 RX ADMIN — Medication 81 MILLIGRAM(S): at 13:14

## 2021-02-25 RX ADMIN — Medication 60 MILLIGRAM(S): at 22:49

## 2021-02-25 RX ADMIN — Medication 200 MILLIGRAM(S): at 22:49

## 2021-02-25 RX ADMIN — SENNA PLUS 2 TABLET(S): 8.6 TABLET ORAL at 22:50

## 2021-02-25 RX ADMIN — LACTULOSE 20 GRAM(S): 10 SOLUTION ORAL at 22:49

## 2021-02-25 RX ADMIN — Medication 667 MILLIGRAM(S): at 17:45

## 2021-02-25 NOTE — CONSULT NOTE ADULT - SUBJECTIVE AND OBJECTIVE BOX
Chief Complaint:  Patient is a 66y old  Male who presents with a chief complaint of lt hand weakness (2021 13:54)    Kidney disease    HTN (hypertension)    DM (diabetes mellitus)    No pertinent past medical history    Wound    MRSA (methicillin resistant Staphylococcus aureus) colonization    CKD (chronic kidney disease)    Below knee amputation status, right    Kidney disease    HTN (hypertension)    DM (diabetes mellitus)    Amputated right leg    Amputated left leg    No significant past surgical history    H/O peripheral artery bypass    S/P BKA (below knee amputation) unilateral, right    No significant past surgical history       HPI:  66yr old male presented to lt hand weakness.  Pt states that he noticed weakness on his left hand last night,but didnt tell anyone. He told the people at the rehab todAY so,he was sent to the ED.  His lt hand weakness is same as  yesterday.  denies loss of sensation.No slurry speech,blurry vision,double vision,facial droop  or weakness of other part of the body.  Denies headache,nausea.vomiting.   (2021 18:57)      No Known Allergies      acetaminophen   Tablet .. 650 milliGRAM(s) Oral every 6 hours PRN  aspirin enteric coated 81 milliGRAM(s) Oral daily  atorvastatin 80 milliGRAM(s) Oral at bedtime  calcium acetate 667 milliGRAM(s) Oral three times a day with meals  dextrose 40% Gel 15 Gram(s) Oral once  dextrose 5%. 1000 milliLiter(s) IV Continuous <Continuous>  dextrose 5%. 1000 milliLiter(s) IV Continuous <Continuous>  dextrose 50% Injectable 25 Gram(s) IV Push once  dextrose 50% Injectable 12.5 Gram(s) IV Push once  dextrose 50% Injectable 25 Gram(s) IV Push once  glucagon  Injectable 1 milliGRAM(s) IntraMuscular once  heparin   Injectable 5000 Unit(s) SubCutaneous every 12 hours  insulin lispro (ADMELOG) corrective regimen sliding scale   SubCutaneous three times a day before meals  labetalol 200 milliGRAM(s) Oral <User Schedule>  lactulose Syrup 20 Gram(s) Oral at bedtime  latanoprost 0.005% Ophthalmic Solution 1 Drop(s) Both EYES at bedtime  NIFEdipine XL 60 milliGRAM(s) Oral at bedtime  pantoprazole  Injectable 40 milliGRAM(s) IV Push daily  prednisoLONE acetate 1% Suspension 1 Drop(s) Left EYE two times a day  senna 2 Tablet(s) Oral at bedtime        FAMILY HISTORY:  Family history of diabetes mellitus          Review of Systems:    General:  No wt loss, fevers, chills, night sweats,fatigue,   Eyes:  Good vision, no reported pain  ENT:  No sore throat, pain, runny nose, dysphagia  CV:  No pain, palpitatioins, hypo/hypertension  Resp:  No dyspnea, cough, tachypnea, wheezing  :  No pain, bleeding, incontinence, nocturia  Muscle:  No pain, weakness  Neuro:  No weakness, tingling, memory problems  Psych:  No fatigue, insomnia, mood problems, depression  Endocrine:  No polyuria, polydypsia, cold/heat intolerance  Heme:  No petechiae, ecchymosis, easy bruisability  Skin:  No rash, tattoos, scars, edema    Relevant Family History:       Relevant Social History:       Physical Exam:    Vital Signs:  Vital Signs Last 24 Hrs  T(C): 36.7 (2021 13:16), Max: 36.7 (2021 05:25)  T(F): 98 (2021 13:16), Max: 98.1 (2021 05:25)  HR: 62 (2021 13:16) (57 - 66)  BP: 110/42 (2021 13:16) (110/42 - 145/66)  BP(mean): --  RR: 18 (2021 13:16) (16 - 18)  SpO2: 100% (2021 13:16) (98% - 100%)  Daily     Daily Weight in k.6 (2021 14:09)    General:  Appears stated age, well-groomed, well-nourished, no distress  HEENT:  NC/AT,  conjunctivae clear and pink, no thyromegaly, nodules, adenopathy, no JVD  Chest:  Full & symmetric excursion, no increased effort, breath sounds clear  Cardiovascular:  Regular rhythm, S1, S2, no murmur/rub/S3/S4, no abdominal bruit, no edema  Abdomen:  Soft, non-tender, non-distended, normoactive bowel sounds,  no masses ,no hepatosplenomeagaly, no signs of chronic liver disease  Extremities:  no cyanosis,clubbing or edema  Skin:  No rash/erythema/ecchymoses/petechiae/wounds/abscess/warm/dry  Neuro/Psych:  Alert, oriented, no asterixis, no tremor, no encephalopathy    Laboratory:                            11.2   7.51  )-----------( 176      ( 2021 06:27 )             35.2     02-    132<L>  |  88<L>  |  60<H>  ----------------------------<  73  4.4   |  28  |  6.02<H>    Ca    8.6      2021 06:27  Phos  8.0     -  Mg     2.3                   Imaging:

## 2021-02-25 NOTE — DISCHARGE NOTE PROVIDER - CARE PROVIDER_API CALL
Kerri Cruz (MD)  Gastroenterology; Internal Medicine  [address] [place] [address]  Phone: [phone]  Fax: [phone]  Follow Up Time:     Nathaniel Karimi)  Internal Medicine  [address] [place] [address]  Phone: [phone]  Fax: [phone]  Follow Up Time:     Tavon Mac)  Internal Medicine  [address] [place] [address]  Phone: [phone]  Fax: [phone]  Follow Up Time:     Rian Zuluaga)  Neurology; Vascular Neurology  [address] [facility], [address]  [place] [address]  Phone: [phone]  Fax: [phone]  Follow Up Time:

## 2021-02-25 NOTE — DISCHARGE NOTE PROVIDER - NSDCFUADDAPPT_GEN_ALL_CORE_FT
Follow up with neurology within 1-2 weeks of discharge - Dr Zuluaga at [address] ([phone]). Call for an appointment.   Follow up with PCP within 1-2 weeks of discharge.   Follow up with nephrology within 1-2 weeks of discharge.   Follow up with gastroenterology within 1-2 weeks of discharge.

## 2021-02-25 NOTE — DISCHARGE NOTE PROVIDER - NSDCCPCAREPLAN_GEN_ALL_CORE_FT
PRINCIPAL DISCHARGE DIAGNOSIS  Diagnosis: CVA (cerebral vascular accident)  Assessment and Plan of Treatment: You presented to the hospital for complaints of arm weakness and you were seen by the neurology team. Your Head CT was negative. Your CT angio of the head showed narrowing of some vessels. Neurology did not think an MRI would change your management. We have chosen to medically manage you with statin therapy and aspirin. Your echocardiogram showed good heart pumping function. You underwent a loop recorder for monitoring of abnormal heart rhythms such as atrial fibrillation. Follow up with electrophysiology team within 1-2 weeks of discharge. Follow up with neurology within 1-2 weeks of discharge - Dr Zuluaga at [address] ([phone]).

## 2021-02-25 NOTE — DISCHARGE NOTE PROVIDER - NSDCMRMEDTOKEN_GEN_ALL_CORE_FT
acetaminophen 325 mg oral tablet: 2 tab(s) orally every 6 hours, As needed, Moderate Pain (4 - 6)  aspirin 81 mg oral delayed release tablet: 1 tab(s) orally once a day  atorvastatin 40 mg oral tablet: 1 tab(s) orally once a day  calcium acetate 667 mg oral tablet: 1 tab(s) orally 3 times a day  furosemide 40 mg oral tablet: 1 tab(s) orally 2 times a day  labetalol 200 mg oral tablet: 1 tab(s) orally 3 times a day(6am, 2pm,10pm)  lactulose 10 g/15 mL oral syrup: 30 milliliter(s) orally once a day (at bedtime)  latanoprost 0.005% ophthalmic solution: 1 drop(s) to each affected eye once a day (in the evening)  Pred Forte 1% ophthalmic suspension: 1 drop(s) to each lt eye 2 times a day  Procardia XL 60 mg oral tablet, extended release: 1 tab(s) orally once a day at bedtime  senna oral tablet: 2 tab(s) orally once a day (at bedtime)   acetaminophen 325 mg oral tablet: 2 tab(s) orally every 6 hours, As needed, Moderate Pain (4 - 6)  aspirin 81 mg oral delayed release tablet: 1 tab(s) orally once a day  atorvastatin 80 mg oral tablet: 1 tab(s) orally once a day (at bedtime)  calcium acetate 667 mg oral tablet: 1 tab(s) orally 3 times a day  labetalol 200 mg oral tablet: 1 tab(s) orally 3 times a day(6am, 2pm,10pm)  lactulose 10 g/15 mL oral syrup: 30 milliliter(s) orally once a day (at bedtime)  latanoprost 0.005% ophthalmic solution: 1 drop(s) to each affected eye once a day (in the evening)  NIFEdipine 60 mg oral tablet, extended release: 1 tab(s) orally once a day (at bedtime)  pantoprazole 40 mg oral delayed release tablet: 1 tab(s) orally once a day  Pred Forte 1% ophthalmic suspension: 1 drop(s) to each lt eye 2 times a day  senna oral tablet: 2 tab(s) orally once a day (at bedtime)

## 2021-02-25 NOTE — DISCHARGE NOTE PROVIDER - CARE PROVIDERS DIRECT ADDRESSES
[email or web address]

## 2021-02-25 NOTE — DISCHARGE NOTE PROVIDER - HOSPITAL COURSE
65 yo M p/w code stroke. He is a 66 year-old male with h/o CVA, DM, HTN, right BKA, left AKA, ESRD on HD M/W/F at [facility] who p/w left hand weakness.     Acute CVA:  - Pt with lt hand weakness.  - CT head-neg  - cont ASA, lipitor 80mg daily, neurocheck q 4hrs  Stroke workup: s/p CTA with occlusions and stenosis of proximal PCA   -TTE w/ bubble study 2/22: Minimal mitral regurgitation.Moderate concentric left ventricular hypertrophy.grossly normal left ventricular systolic function. Moderate diastolic dysfunction (Stage II).grossly normal right ventricular systolic function.A bubble study was performed with the intravenous injection of agitated saline contrast and was inconclusive  regarding the presence of an interatrial shunt.  - EP consulted for evaluation for ILR to look for any evidence of Afib _____.  - passed S+S - diet started   - outpatient follow up with EP, neurology upon discharge     Intractable hiccups - GI consulted   - Trial ativan 0.5mg PO x 1 on 2/24 - now resolved  2/23 Abd XR: Nonobstructive bowel gas pattern without evidence of free air.Moderate stool throughout the right hemiabdomen.  - constipation now relieved     Hypertension, unspecified type  - BP meds restarted  - continue on home regimen upon discharge     Type 2 diabetes mellitus:  - on sliding scale while in hospital  - monitor FS AC/HS    ESRD (end stage renal disease) on dialysis.    HD on MWF  Outpatient follow up with nephrology    Glaucoma of both eyes, unspecified glaucoma type.    cont latanoprost.     On ___ this case was reviewed with  ____, the patient is medically stable and optimized for discharge. All medications were reviewed and prescriptions were sent to mutually agreed upon pharmacy.    65 y/o male with PMHx of CVA, DM, HTN, right BKA, left AKA, ESRD on HD M/W/F at [facility] who presented with left hand weakness, s/p code stroke.     1. Left hand weakness: Seen by neurology. Head CT negative. CTA with multiple chronic infarcts and diffuse atheroscleroti disease with occlusion of the bilateral parieto-occiptal PCA branches/severe proximal PCA stenosis and mild stenosis of distal b/l MCA and DENISE branches.   MRI was deferred given it will not . Echo showed EF 66%, minimal MR, moderate diastolic dysfunction, inconclusive bubble study for interatrial shunt. Recommended for ILR, which was done 2/26 without complications. No role for Plavix per neuro, continue statin therapy and outpatient follow up with Dr Zuluaga at [address] ([phone]).     2. Hiccups - Course complicated by intractable hiccups without relief after Zofran or Ativan. Resolved with PRN Thorazine.     Patient seen and evaluated, no acute somatic complaints. Reviewed discharge medications with patient; All new medications requiring new prescriptions were sent to the pharmacy of patient's choice. Reviewed need for prescription for previous home medications and new prescriptions sent if requested. Medically cleared/stable for discharge as per Dr. Mac with close outpatient follow up within 1-2 weeks of discharge. Patient understands and agrees with plan of care.

## 2021-02-25 NOTE — DISCHARGE NOTE PROVIDER - PROVIDER TOKENS
PROVIDER:[TOKEN:[[id number]]],PROVIDER:[TOKEN:[[id number]]],PROVIDER:[TOKEN:[[id number]]],PROVIDER:[TOKEN:[[id number]]]

## 2021-02-26 ENCOUNTER — TRANSCRIPTION ENCOUNTER (OUTPATIENT)
Age: 66
End: 2021-02-26

## 2021-02-26 VITALS
WEIGHT: 131.4 LBS | TEMPERATURE: 98 F | HEART RATE: 53 BPM | RESPIRATION RATE: 16 BRPM | SYSTOLIC BLOOD PRESSURE: 146 MMHG | DIASTOLIC BLOOD PRESSURE: 48 MMHG | OXYGEN SATURATION: 98 %

## 2021-02-26 DIAGNOSIS — Z71.89 OTHER SPECIFIED COUNSELING: ICD-10-CM

## 2021-02-26 LAB
ALBUMIN SERPL ELPH-MCNC: 3.8 G/DL — SIGNIFICANT CHANGE UP (ref 3.3–5)
ALP SERPL-CCNC: 104 U/L — SIGNIFICANT CHANGE UP (ref 40–120)
ALT FLD-CCNC: 21 U/L — SIGNIFICANT CHANGE UP (ref 4–41)
ANION GAP SERPL CALC-SCNC: 17 MMOL/L — HIGH (ref 7–14)
AST SERPL-CCNC: 14 U/L — SIGNIFICANT CHANGE UP (ref 4–40)
BILIRUB SERPL-MCNC: 0.3 MG/DL — SIGNIFICANT CHANGE UP (ref 0.2–1.2)
BUN SERPL-MCNC: 64 MG/DL — HIGH (ref 7–23)
CALCIUM SERPL-MCNC: 8 MG/DL — LOW (ref 8.4–10.5)
CHLORIDE SERPL-SCNC: 94 MMOL/L — LOW (ref 98–107)
CO2 SERPL-SCNC: 26 MMOL/L — SIGNIFICANT CHANGE UP (ref 22–31)
CREAT SERPL-MCNC: 6.7 MG/DL — HIGH (ref 0.5–1.3)
GLUCOSE SERPL-MCNC: 103 MG/DL — HIGH (ref 70–99)
HCT VFR BLD CALC: 33.8 % — LOW (ref 39–50)
HGB BLD-MCNC: 10.6 G/DL — LOW (ref 13–17)
MAGNESIUM SERPL-MCNC: 2.3 MG/DL — SIGNIFICANT CHANGE UP (ref 1.6–2.6)
MCHC RBC-ENTMCNC: 27.9 PG — SIGNIFICANT CHANGE UP (ref 27–34)
MCHC RBC-ENTMCNC: 31.4 GM/DL — LOW (ref 32–36)
MCV RBC AUTO: 88.9 FL — SIGNIFICANT CHANGE UP (ref 80–100)
NRBC # BLD: 0 /100 WBCS — SIGNIFICANT CHANGE UP
NRBC # FLD: 0 K/UL — SIGNIFICANT CHANGE UP
PHOSPHATE SERPL-MCNC: 7.3 MG/DL — HIGH (ref 2.5–4.5)
PLATELET # BLD AUTO: 218 K/UL — SIGNIFICANT CHANGE UP (ref 150–400)
POTASSIUM SERPL-MCNC: 4.4 MMOL/L — SIGNIFICANT CHANGE UP (ref 3.5–5.3)
POTASSIUM SERPL-SCNC: 4.4 MMOL/L — SIGNIFICANT CHANGE UP (ref 3.5–5.3)
PROT SERPL-MCNC: 7.5 G/DL — SIGNIFICANT CHANGE UP (ref 6–8.3)
RBC # BLD: 3.8 M/UL — LOW (ref 4.2–5.8)
RBC # FLD: 15.2 % — HIGH (ref 10.3–14.5)
SARS-COV-2 RNA SPEC QL NAA+PROBE: SIGNIFICANT CHANGE UP
SODIUM SERPL-SCNC: 137 MMOL/L — SIGNIFICANT CHANGE UP (ref 135–145)
WBC # BLD: 5.65 K/UL — SIGNIFICANT CHANGE UP (ref 3.8–10.5)
WBC # FLD AUTO: 5.65 K/UL — SIGNIFICANT CHANGE UP (ref 3.8–10.5)

## 2021-02-26 PROCEDURE — 33285 INSJ SUBQ CAR RHYTHM MNTR: CPT

## 2021-02-26 RX ORDER — NIFEDIPINE 30 MG
1 TABLET, EXTENDED RELEASE 24 HR ORAL
Qty: 0 | Refills: 0 | DISCHARGE
Start: 2021-02-26

## 2021-02-26 RX ORDER — CALCIUM ACETATE 667 MG
1334 TABLET ORAL
Refills: 0 | Status: DISCONTINUED | OUTPATIENT
Start: 2021-02-26 | End: 2021-02-26

## 2021-02-26 RX ORDER — ATORVASTATIN CALCIUM 80 MG/1
1 TABLET, FILM COATED ORAL
Qty: 0 | Refills: 0 | DISCHARGE
Start: 2021-02-26

## 2021-02-26 RX ADMIN — HEPARIN SODIUM 5000 UNIT(S): 5000 INJECTION INTRAVENOUS; SUBCUTANEOUS at 06:07

## 2021-02-26 RX ADMIN — Medication 200 MILLIGRAM(S): at 07:25

## 2021-02-26 NOTE — PROGRESS NOTE ADULT - PROBLEM SELECTOR PLAN 4
HD on MWF  on phoslo  Renal FU noted

## 2021-02-26 NOTE — PROGRESS NOTE ADULT - PROBLEM SELECTOR PROBLEM 3
ACP (advance care planning)
Type 2 diabetes mellitus with other neurologic complication, without long-term current use of insulin

## 2021-02-26 NOTE — PROGRESS NOTE ADULT - ASSESSMENT
66 yr old male with Lt hand weakness
66 yr old male with Lt hand weakness
66yr old male with history of HTN, DM, Stroke, admitted for left hand weakness. Nephrology consulted for hemodialysis treatment    A/P:  ESRD  MWF via AVF  outpt unit  [place] [facility]   s/p CTA on 2/23   s/p HD 2/24  HD per schedule   Renal diet  Consent in chart    HTN  Bp Improving  Low salt diet  Monitor BP    Hyponatremia  In the setting of H D patient  Monitor serum na    Anemia  at goal  Monitor Hb    CKD:MBD:  Check PTH  HYperphosphatemia: Low PO4 diet     left hand weakness  s/p  CTA  f/u neuro    
66yr old male with history of HTN, DM, Stroke, admitted for left hand weakness. Nephrology consulted for hemodialysis treatment    A/P:  ESRD  MWF via AVF  outpt unit  [place] [facility]   s/p CTA on 2/23   s/p HD 2/24  Seen in HD today. Continue HD as ordered. tolerating well. vitals stable. access working well   Renal diet  Consent in chart    HTN  Bp Improving  Low salt diet  Monitor BP    Hyponatremia  In the setting of H D patient  Monitor serum na    Anemia  at goal  Monitor Hb    CKD:MBD:  pth ok for ckd  HYperphosphatemia: increase phoslo 1334 tid    left hand weakness  s/p  CTA  f/u neuro    
66y R-handed M with h/o ESRD on HD (M/W/F), HTN, DM, MRSA colonization, R. BKA, L. AKA and multiple chronic embolic infarcts (B/L occipital, B/L cerebellar, L. temporal) with residual visual impairment p/w acute onset left hypoesthetic hemiparesis after most recent HD session, improving but not back to baseline.   PT is unclear of his meds but notes he is inconsistently compliant with ASA 81.       IMPRESSION: Acute onset LUE hypoesthetic hemiparesis due to HD related acute effect vs. new R. thalamocapsular/ R. cortical infarct suspected mechanism .  Additionally, PT with chronic B/L severe visual deficits, B/L hemianopsia due to B/L chronic occipital infarcts and B/L ataxia on FTN either due to visual impairment induced balint syndrome vs. B/L chronic cerebellar infarcts.      PLAN:  [] Return to normotension  [x] CTH and CTA as above  [] at this time can defer MRI as it will not   [x] TTE as above  [] would place ILR as patient with L atrium dilation and numerous infarcts with artery occlusions to investigate for possible underlying afib. If Afib found then would start anticoagulation  [x] continue aspirin 81mg  [] no role for Plavix at this time  [] continue atorvastatin 80mg   [] DVT PPx  [x] A1c 5.1, LDL 60    Case discussed with stroke neurology attending, Dr. Zuluaga.   
66yr old male with history of HTN, DM, Stroke, admitted for left hand weakness. Nephrology consulted for hemodialysis treatment    A/P:  ESRD  MWF via AVF  outpt unit  [place] [facility]   s/p HD 2/22  HD tmr. planning for CTA. HD after CTA  Renal diet  Consent in chart    HTN  Bp uncontrolled  resume current meds  Monitor BP    Hyponatremia  In the setting of H D patient  Monitor serum na    Anemia  at goal  Monitor Hb    CKD:MBD:  Check PTH  HYperphosphatemia: Low PO4 diet     left hand weakness  pending CTA  f/u neuro  possible would need MRI with contrast  
66yr old male with history of HTN, DM, Stroke, admitted for left hand weakness. Nephrology consulted for hemodialysis treatment    A/P:  ESRD  MWF via AVF  outpt unit  [place] [facility]   Plan for HD today  Renal diet  Consent in chart    HTN  Bp fluctuating   Continue currrent medications  Monitor BP    Hyponatremia  In the setting of H D patient  Monitor serum na    Anemia  at goal  Monitor Hb    CKD:MBD:  Check PTH  HYperphosphatemia: Low PO4 diet   
66yr old male with history of HTN, DM, Stroke, admitted for left hand weakness. Nephrology consulted for hemodialysis treatment    A/P:  ESRD  MWF via AVF  outpt unit  Call HD   s/p CTA on 2/23 -- Plan for HD today  Renal diet  Consent in chart    HTN  Bp Improving  Low salt diet  Monitor BP    Hyponatremia  In the setting of H D patient  Monitor serum na    Anemia  at goal  Monitor Hb    CKD:MBD:  Check PTH  HYperphosphatemia: Low PO4 diet     left hand weakness  s/p  CTA  f/u neuro  possible would need MRI with contrast  
66y R-handed M with h/o ESRD on HD (M/W/F), HTN, DM, MRSA colonization, R. BKA, L. AKA and multiple chronic embolic infarcts (B/L occipital, B/L cerebellar, L. temporal) with residual visual impairment p/w acute onset left hypoesthetic hemiparesis after most recent HD session, improving but not back to baseline.  CT scan of the head revealed multiple chronic infarcts including both occipital lobes and cerebellar hemispheres with small vessel ischemic changes.  Echocardiogram shows moderately dilated left atrium and normal LV systolic function. There is no EKG or telemetry evidence of an arrhythmia.   We have spoken to the patient about the benefits of prolonged monitoring with an ILR for the detection of AFib/PAF as a cause of cardioembolic stroke.  He understands.  All questions answered and he now agrees and has consented.   Plan:  ILR scheduled for later today.  Patient does not need to be NPO.   
66 yr old male with Lt hand weakness

## 2021-02-26 NOTE — PROGRESS NOTE ADULT - PROBLEM SELECTOR PLAN 3
Advanced care planning was discussed with patient and family.  Advanced care planning forms were reviewed and discussed.  Risks, benefits and alternatives of gastroenterologic procedures were discussed in detail and all questions were answered.  30 minutes spent.
on sliding scale

## 2021-02-26 NOTE — PROGRESS NOTE ADULT - PROBLEM SELECTOR PROBLEM 2
Hypertension, unspecified type
Hiccups
Hypertension, unspecified type

## 2021-02-26 NOTE — PROGRESS NOTE ADULT - REASON FOR ADMISSION
lt hand weakness
Left hand weakness
lt hand weakness

## 2021-02-26 NOTE — PROGRESS NOTE ADULT - PROVIDER SPECIALTY LIST ADULT
Electrophysiology
Nephrology
Nephrology
Neurology
Nephrology
Internal Medicine
Internal Medicine
Gastroenterology
Internal Medicine

## 2021-02-26 NOTE — PROVIDER CONTACT NOTE (CRITICAL VALUE NOTIFICATION) - SITUATION
Patients BG level was 66. Patient asymptomatic. BECKI Huber made aware. 2 apple juices given. Rechecked sugar was 92. Gave one more cup of apple juice. Rechecked sugar 15 mins after and got a sugar over 100 and then 15 mins after that patient got another reading over 100.

## 2021-02-26 NOTE — PROGRESS NOTE ADULT - SUBJECTIVE AND OBJECTIVE BOX
*************************************  NEUROLOGY PROGRESS NOTE  **************************************    JAMES PATRICK  Male  MRN-[id number]    Subjective:  Patient was seen and examined at the bedside. Stated that he feels ok. No acute complaints.    ROS:  Reviewed of systems reviewed and pertinent positives as per HPI. All others negative.     MEDICATIONS  (STANDING):  aspirin enteric coated 81 milliGRAM(s) Oral daily  atorvastatin 80 milliGRAM(s) Oral at bedtime  calcium acetate 667 milliGRAM(s) Oral three times a day with meals  dextrose 40% Gel 15 Gram(s) Oral once  dextrose 5%. 1000 milliLiter(s) (50 mL/Hr) IV Continuous <Continuous>  dextrose 5%. 1000 milliLiter(s) (100 mL/Hr) IV Continuous <Continuous>  dextrose 50% Injectable 25 Gram(s) IV Push once  dextrose 50% Injectable 12.5 Gram(s) IV Push once  dextrose 50% Injectable 25 Gram(s) IV Push once  glucagon  Injectable 1 milliGRAM(s) IntraMuscular once  heparin   Injectable 5000 Unit(s) SubCutaneous every 12 hours  insulin lispro (ADMELOG) corrective regimen sliding scale   SubCutaneous three times a day before meals  labetalol 200 milliGRAM(s) Oral <User Schedule>  lactulose Syrup 20 Gram(s) Oral at bedtime  latanoprost 0.005% Ophthalmic Solution 1 Drop(s) Both EYES at bedtime  NIFEdipine XL 60 milliGRAM(s) Oral at bedtime  pantoprazole  Injectable 40 milliGRAM(s) IV Push daily  polyethylene glycol 3350 17 Gram(s) Oral two times a day  prednisoLONE acetate 1% Suspension 1 Drop(s) Left EYE two times a day  senna 2 Tablet(s) Oral at bedtime    MEDICATIONS  (PRN):  acetaminophen   Tablet .. 650 milliGRAM(s) Oral every 6 hours PRN Temp greater or equal to 38C (100.4F), Mild Pain (1 - 3)    VITAL SIGNS:  Vital Signs Last 24 Hrs  T(C): 36.7 (24 Feb 2021 06:01), Max: 36.7 (23 Feb 2021 21:58)  T(F): 98.1 (24 Feb 2021 06:01), Max: 98.1 (23 Feb 2021 21:58)  HR: 60 (24 Feb 2021 06:01) (59 - 69)  BP: 125/57 (24 Feb 2021 06:01) (125/57 - 183/79)  BP(mean): --  RR: 16 (24 Feb 2021 06:01) (16 - 16)  SpO2: 96% (24 Feb 2021 06:01) (96% - 100%)    PHYSICAL EXAMINATION:  General: Well-developed, well nourished, in no acute distress.  Neurologic:  - Mental Status:  Alert, awake, oriented to person, place, and time; Speech is fluent with intact comprehension  - Cranial Nerves II-XII:  EOMI, reduced blink to threat bilaterally, L nasolabial fold flattening  - Motor:  Trace L pronator drift. Both arms are antigravity bilaterally. L AKA, R BKA moving spontaneously.   - Sensory:  Decreased sensation to LT on the L.   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  -Likely chronic lacunar infarcts involving the bilateral thalami and right desire.  -Consider MRI for further evaluation.    CT angiogram head:  -Diffuse atherosclerotic disease with occlusion of the bilateral parieto-occipital PCA branches, severe proximal PCA stenoses, and at least mild stenoses of the distal bilateral MCA and DENISE branches.    CT angiogram neck:  -No vaso-occlusive disease.    < end of copied text >    < from: TTE with Doppler (w/Cont) (02.23.21 @ 08:49) >  CONCLUSIONS:  1. Mitral annular calcification, otherwise normal mitral  valve. Minimal mitral regurgitation.  2. Moderately dilated left atrium.  LA volume index = 42  cc/m2.  3. Moderate concentric left ventricular hypertrophy.  4. Endocardium not well visualized; grossly normal left  ventricular systolic function.  5. Moderate diastolic dysfunction (Stage II).  6. The right ventricle is not well visualized; grossly  normal right ventricular systolic function.  7. A bubble study was performed with the intravenous  injection of agitated saline contrast and was inconclusive  regarding the presence of an interatrial shunt.  No obvious cardiac source of embolus was identified on this  transthoracic study.  If clinical suspicion is high,  consider [place] for further evaluation.  --------------------------------------------------------    < end of copied text >      
[facility] [place] [facility]   MD JEANNETTE CARRION DO ANAM SIDDIQUI ANGELA WONG, PA    TEL:  OFFICE: [phone]  DR ROSENBERG CELL: [phone]  DR. HANSEN CELL: [phone]  DR. MANDEL CELL: [phone]  YONNY WEBB CELL: [phone]    From 5pm-7am Answering Service [phone]    -- RENAL FOLLOW UP NOTE ---Date of Service 02-23-21 @ 14:16    Patient is a 66y old  Male who presents with a chief complaint of lt hand weakness (23 Feb 2021 13:13)      Patient seen and examined at bedside. n/v today    VITALS:  T(F): 97.6 (02-22-21 @ 23:30), Max: 98.7 (02-22-21 @ 14:44)  HR: 74 (02-23-21 @ 07:04)  BP: 198/75 (02-23-21 @ 07:04)  RR: 18 (02-22-21 @ 23:30)  SpO2: 100% (02-22-21 @ 23:30)  Wt(kg): --    02-22 @ 07:01  -  02-23 @ 07:00  --------------------------------------------------------  IN: 400 mL / OUT: 2400 mL / NET: -2000 mL          PHYSICAL EXAM:  Constitutional: NAD  Neck: No JVD  Respiratory: CTAB, no wheezes, rales or rhonchi  Cardiovascular: S1, S2, RRR  Gastrointestinal: BS+, soft, NT/ND  Extremities: No peripheral edema    Hospital Medications:   MEDICATIONS  (STANDING):  aspirin enteric coated 81 milliGRAM(s) Oral daily  atorvastatin 80 milliGRAM(s) Oral at bedtime  calcium acetate 667 milliGRAM(s) Oral three times a day with meals  dextrose 40% Gel 15 Gram(s) Oral once  dextrose 5%. 1000 milliLiter(s) (50 mL/Hr) IV Continuous <Continuous>  dextrose 5%. 1000 milliLiter(s) (100 mL/Hr) IV Continuous <Continuous>  dextrose 50% Injectable 25 Gram(s) IV Push once  dextrose 50% Injectable 12.5 Gram(s) IV Push once  dextrose 50% Injectable 25 Gram(s) IV Push once  glucagon  Injectable 1 milliGRAM(s) IntraMuscular once  heparin   Injectable 5000 Unit(s) SubCutaneous every 12 hours  insulin lispro (ADMELOG) corrective regimen sliding scale   SubCutaneous three times a day before meals  labetalol 200 milliGRAM(s) Oral <User Schedule>  lactulose Syrup 20 Gram(s) Oral at bedtime  latanoprost 0.005% Ophthalmic Solution 1 Drop(s) Both EYES at bedtime  NIFEdipine XL 60 milliGRAM(s) Oral at bedtime  pantoprazole  Injectable 40 milliGRAM(s) IV Push daily  polyethylene glycol 3350 17 Gram(s) Oral two times a day  prednisoLONE acetate 1% Suspension 1 Drop(s) Left EYE two times a day  senna 2 Tablet(s) Oral at bedtime      LABS:  02-23    133<L>  |  88<L>  |  37<H>  ----------------------------<  106<H>  4.9   |  29  |  4.34<H>    Ca    9.6      23 Feb 2021 08:02  Phos  4.2     02-23  Mg     2.5     02-23      Creatinine Trend: 4.34 <--, 6.17 <--, 5.20 <--, 4.20 <--    Phosphorus Level, Serum: 4.2 mg/dL (02-23 @ 08:02)                              12.8   6.48  )-----------( 206      ( 23 Feb 2021 08:02 )             41.8     Urine Studies:      .3 (Ca --)      [04-01-20 @ 11:03]   --  HbA1c 4.2      [07-19-19 @ 09:56]  Lipid: chol 117, TG 63, HDL 44, LDL --      [02-21-21 @ 07:29]        RADIOLOGY & ADDITIONAL STUDIES:  
[facility] [place] [facility]   MD MARIBELL CARRION MD RUORU WONG, PA    TEL:  OFFICE: [phone]  DR ROSENBERG CELL: [phone]  SANJUANA WEBB CELL: [phone]  DR. MANDEL CELL: [phone]  DR. HANSEN CELL: [phone]    FROM 5 PM - 7 AM PLEASE CALL ANSWERING SERVICE: [phone]    RENAL FOLLOW UP NOTE--Date of Service 02-22-21 @ 09:15  --------------------------------------------------------------------------------  HPI:      Pt seen and examined at bedside.   Denies SOB, chest pain     PAST HISTORY  --------------------------------------------------------------------------------  No significant changes to PMH, PSH, FHx, SHx, unless otherwise noted    ALLERGIES & MEDICATIONS  --------------------------------------------------------------------------------  Allergies    No Known Allergies    Intolerances      Standing Inpatient Medications  aspirin enteric coated 81 milliGRAM(s) Oral daily  atorvastatin 80 milliGRAM(s) Oral at bedtime  calcium acetate 667 milliGRAM(s) Oral three times a day with meals  dextrose 40% Gel 15 Gram(s) Oral once  dextrose 5%. 1000 milliLiter(s) IV Continuous <Continuous>  dextrose 5%. 1000 milliLiter(s) IV Continuous <Continuous>  dextrose 50% Injectable 25 Gram(s) IV Push once  dextrose 50% Injectable 12.5 Gram(s) IV Push once  dextrose 50% Injectable 25 Gram(s) IV Push once  glucagon  Injectable 1 milliGRAM(s) IntraMuscular once  heparin   Injectable 5000 Unit(s) SubCutaneous every 12 hours  insulin lispro (ADMELOG) corrective regimen sliding scale   SubCutaneous three times a day before meals  labetalol 200 milliGRAM(s) Oral <User Schedule>  lactulose Syrup 20 Gram(s) Oral at bedtime  latanoprost 0.005% Ophthalmic Solution 1 Drop(s) Both EYES at bedtime  prednisoLONE acetate 1% Suspension 1 Drop(s) Left EYE two times a day  senna 2 Tablet(s) Oral at bedtime    PRN Inpatient Medications  acetaminophen   Tablet .. 650 milliGRAM(s) Oral every 6 hours PRN      REVIEW OF SYSTEMS  --------------------------------------------------------------------------------  General: no fever  CVS: no chest pain  RESP: no sob, no cough  ABD: no abdominal pain  : no dysuria,  MSK: no edema     VITALS/PHYSICAL EXAM  --------------------------------------------------------------------------------  T(C): 36.8 (02-22-21 @ 05:23), Max: 36.8 (02-22-21 @ 05:23)  HR: 58 (02-22-21 @ 06:35) (58 - 61)  BP: 143/58 (02-22-21 @ 06:35) (143/58 - 213/76)  RR: 18 (02-22-21 @ 05:23) (18 - 18)  SpO2: 99% (02-22-21 @ 05:23) (96% - 99%)  Wt(kg): --  Height (cm): 198.1 (02-20-21 @ 16:03)  Weight (kg): 65.7 (02-21-21 @ 04:21)  BMI (kg/m2): 16.7 (02-21-21 @ 04:21)  BSA (m2): 1.97 (02-21-21 @ 04:21)      Physical Exam:  	Gen: NAD  	HEENT: MMM  	Pulm: CTA B/L  	CV: S1S2  	Abd: Soft, +BS  	Ext: B/L amputation                       Neuro: Awake   	Skin: Warm and Dry   	Vascular access: avf           no  billy  LABS/STUDIES  --------------------------------------------------------------------------------              11.5   6.19  >-----------<  186      [02-22-21 @ 07:37]              36.7     131  |  93  |  66  ----------------------------<  101      [02-22-21 @ 07:37]  5.1   |  22  |  6.17        Ca     8.9     [02-22-21 @ 07:37]      Mg     2.4     [02-22-21 @ 07:37]      Phos  4.8     [02-22-21 @ 07:37]    TPro  7.8  /  Alb  3.7  /  TBili  0.4  /  DBili  x   /  AST  18  /  ALT  29  /  AlkPhos  119  [02-21-21 @ 07:29]    PT/INR: PT 11.2 , INR 0.98       [02-20-21 @ 16:46]  PTT: 21.1       [02-20-21 @ 16:46]      Creatinine Trend:  SCr 6.17 [02-22 @ 07:37]  SCr 5.20 [02-21 @ 07:29]  SCr 4.20 [02-20 @ 18:07]        .3 (Ca --)      [04-01-20 @ 11:03]   --  HbA1c 4.2      [07-19-19 @ 09:56]  Lipid: chol 117, TG 63, HDL 44, LDL --      [02-21-21 @ 07:29]      
[facility] [place] [facility]   MD JEANNETTE CARRION DO ANAM SIDDIQUI ANGELA WONG, PA    TEL:  OFFICE: [phone]  DR ROSENBERG CELL: [phone]  DR. HANSEN CELL: [phone]  DR. MANDEL CELL: [phone]  YONNY WEBB CELL: [phone]    From 5pm-7am Answering Service [phone]    -- RENAL FOLLOW UP NOTE ---Date of Service 02-26-21 @ 14:12    Patient is a 66y old  Male who presents with a chief complaint of lt hand weakness (26 Feb 2021 13:34)      Patient seen and examined in HD. No chest pain/sob    VITALS:  T(F): 97.5 (02-26-21 @ 11:05), Max: 98.3 (02-25-21 @ 17:46)  HR: 58 (02-26-21 @ 11:05)  BP: 136/53 (02-26-21 @ 11:05)  RR: 16 (02-26-21 @ 11:05)  SpO2: 100% (02-26-21 @ 11:05)  Wt(kg): --  flow 400    02-25 @ 07:01  -  02-26 @ 07:00  --------------------------------------------------------  IN: 150 mL / OUT: 115 mL / NET: 35 mL          PHYSICAL EXAM:  Constitutional: NAD  Neck: No JVD  Respiratory: CTAB, no wheezes, rales or rhonchi  Cardiovascular: S1, S2, RRR  Gastrointestinal: BS+, soft, NT/ND  Extremities: b/l bka    Hospital Medications:   MEDICATIONS  (STANDING):  aspirin enteric coated 81 milliGRAM(s) Oral daily  atorvastatin 80 milliGRAM(s) Oral at bedtime  calcium acetate 667 milliGRAM(s) Oral three times a day with meals  dextrose 40% Gel 15 Gram(s) Oral once  dextrose 5%. 1000 milliLiter(s) (50 mL/Hr) IV Continuous <Continuous>  dextrose 5%. 1000 milliLiter(s) (100 mL/Hr) IV Continuous <Continuous>  dextrose 50% Injectable 25 Gram(s) IV Push once  dextrose 50% Injectable 12.5 Gram(s) IV Push once  dextrose 50% Injectable 25 Gram(s) IV Push once  glucagon  Injectable 1 milliGRAM(s) IntraMuscular once  heparin   Injectable 5000 Unit(s) SubCutaneous every 12 hours  insulin lispro (ADMELOG) corrective regimen sliding scale   SubCutaneous three times a day before meals  labetalol 200 milliGRAM(s) Oral <User Schedule>  lactulose Syrup 20 Gram(s) Oral at bedtime  latanoprost 0.005% Ophthalmic Solution 1 Drop(s) Both EYES at bedtime  NIFEdipine XL 60 milliGRAM(s) Oral at bedtime  pantoprazole  Injectable 40 milliGRAM(s) IV Push daily  prednisoLONE acetate 1% Suspension 1 Drop(s) Left EYE two times a day  senna 2 Tablet(s) Oral at bedtime      LABS:  02-26    137  |  94<L>  |  64<H>  ----------------------------<  103<H>  4.4   |  26  |  6.70<H>    Ca    8.0<L>      26 Feb 2021 11:20  Phos  7.3     02-26  Mg     2.3     02-26    TPro  7.5  /  Alb  3.8  /  TBili  0.3  /  DBili      /  AST  14  /  ALT  21  /  AlkPhos  104  02-26    Creatinine Trend: 6.70 <--, 5.25 <--, 6.02 <--, 4.34 <--, 6.17 <--, 5.20 <--, 4.20 <--    Albumin, Serum: 3.8 g/dL (02-26 @ 11:20)  Phosphorus Level, Serum: 7.3 mg/dL (02-26 @ 11:20)  Albumin, Serum: 3.8 g/dL (02-25 @ 14:40)  Phosphorus Level, Serum: 7.1 mg/dL (02-25 @ 14:40)    calcium--  intact [id number]  parathyroid hormone intact, serum--                            10.6   5.65  )-----------( 218      ( 26 Feb 2021 11:20 )             33.8     Urine Studies:      PTH -- (Ca --)      [02-25-21 @ 14:40]   271  .3 (Ca --)      [04-01-20 @ 11:03]   --  HbA1c 4.2      [07-19-19 @ 09:56]  Lipid: chol 117, TG 63, HDL 44, LDL --      [02-21-21 @ 07:29]    HBsAb 26.2      [02-23-21 @ 10:09]  HBcAb Nonreact      [02-23-21 @ 10:09]  HCV 0.10, Nonreact      [02-23-21 @ 10:09]      RADIOLOGY & ADDITIONAL STUDIES:  
Patient is a 66y old  Male who presents with a chief complaint of lt hand weakness (22 Feb 2021 09:15)      HPI:  66yr old male presented to lt hand weakness.  Pt states that he noticed weakness on his left hand last night,but didnt tell anyone. He told the people at the rehab todAY so,he was sent to the ED.  His lt hand weakness is same as  yesterday.  denies loss of sensation.No slurry speech,blurry vision,double vision,facial droop  or weakness of other part of the body.  Denies headache,nausea.vomiting.   (20 Feb 2021 18:57)    2/22/2021  Left hand weakness +      PAST MEDICAL & SURGICAL HISTORY:  Kidney disease    HTN (hypertension)    DM (diabetes mellitus)    Wound  (chronic wounds to left foot and leg)    MRSA (methicillin resistant Staphylococcus aureus) colonization  (hx/o MRSA growth from wound culture)    CKD (chronic kidney disease)    Below knee amputation status, right    HTN (hypertension)    DM (diabetes mellitus)    Amputated right leg  below knee    Amputated left leg  above knee    H/O peripheral artery bypass  left fem to below-knee pop with RSVG    S/P BKA (below knee amputation) unilateral, right        Review of Systems:   CONSTITUTIONAL: No fever, weight loss, or fatigue  EYES: No eye pain, visual disturbances, or discharge  ENMT:  No difficulty hearing, tinnitus, vertigo; No sinus or throat pain  NECK: No pain or stiffness  BREASTS: No pain, masses, or nipple discharge  RESPIRATORY: No cough, wheezing, chills or hemoptysis; No shortness of breath  CARDIOVASCULAR: No chest pain, palpitations, dizziness, or leg swelling  GASTROINTESTINAL: No abdominal or epigastric pain. No nausea, vomiting, or hematemesis; No diarrhea or constipation. No melena or hematochezia.  GENITOURINARY: No dysuria, frequency, hematuria, or incontinence  NEUROLOGICAL: No headaches, memory loss, loss of strength, numbness, or tremors  SKIN: No itching, burning, rashes, or lesions   LYMPH NODES: No enlarged glands  ENDOCRINE: No heat or cold intolerance; No hair loss  MUSCULOSKELETAL: No joint pain or swelling; No muscle, back, or extremity pain  PSYCHIATRIC: No depression, anxiety, mood swings, or difficulty sleeping  HEME/LYMPH: No easy bruising, or bleeding gums  ALLERY AND IMMUNOLOGIC: No hives or eczema    Allergies    No Known Allergies    Intolerances        Social History:     FAMILY HISTORY:  Family history of diabetes mellitus        MEDICATIONS  (STANDING):  aspirin enteric coated 81 milliGRAM(s) Oral daily  atorvastatin 80 milliGRAM(s) Oral at bedtime  calcium acetate 667 milliGRAM(s) Oral three times a day with meals  dextrose 40% Gel 15 Gram(s) Oral once  dextrose 5%. 1000 milliLiter(s) (50 mL/Hr) IV Continuous <Continuous>  dextrose 5%. 1000 milliLiter(s) (100 mL/Hr) IV Continuous <Continuous>  dextrose 50% Injectable 25 Gram(s) IV Push once  dextrose 50% Injectable 12.5 Gram(s) IV Push once  dextrose 50% Injectable 25 Gram(s) IV Push once  glucagon  Injectable 1 milliGRAM(s) IntraMuscular once  heparin   Injectable 5000 Unit(s) SubCutaneous every 12 hours  insulin lispro (ADMELOG) corrective regimen sliding scale   SubCutaneous three times a day before meals  labetalol 200 milliGRAM(s) Oral <User Schedule>  lactulose Syrup 20 Gram(s) Oral at bedtime  latanoprost 0.005% Ophthalmic Solution 1 Drop(s) Both EYES at bedtime  prednisoLONE acetate 1% Suspension 1 Drop(s) Left EYE two times a day  senna 2 Tablet(s) Oral at bedtime    MEDICATIONS  (PRN):  acetaminophen   Tablet .. 650 milliGRAM(s) Oral every 6 hours PRN Temp greater or equal to 38C (100.4F), Mild Pain (1 - 3)        CAPILLARY BLOOD GLUCOSE      POCT Blood Glucose.: 102 mg/dL (22 Feb 2021 05:13)  POCT Blood Glucose.: 77 mg/dL (22 Feb 2021 03:48)  POCT Blood Glucose.: 113 mg/dL (21 Feb 2021 17:43)    I&O's Summary      PHYSICAL EXAM:  Vital Signs Last 24 Hrs  T(C): 36.8 (22 Feb 2021 05:23), Max: 36.8 (22 Feb 2021 05:23)  T(F): 98.3 (22 Feb 2021 05:23), Max: 98.3 (22 Feb 2021 05:23)  HR: 58 (22 Feb 2021 06:35) (58 - 61)  BP: 143/58 (22 Feb 2021 06:35) (143/58 - 213/76)  BP(mean): --  RR: 18 (22 Feb 2021 05:23) (18 - 18)  SpO2: 99% (22 Feb 2021 05:23) (96% - 99%)    GENERAL: NAD, well-developed  HEAD:  Atraumatic, Normocephalic  EYES: EOMI, PERRLA, conjunctiva and sclera clear  NECK: Supple, No JVD  CHEST/LUNG: Clear to auscultation bilaterally; No wheeze  HEART: Regular rate and rhythm; No murmurs, rubs, or gallops  ABDOMEN: Soft, Nontender, Nondistended; Bowel sounds present  EXTREMITIES:  2+ Peripheral Pulses, No clubbing, cyanosis, or edema  PSYCH: AAOx3  NEUROLOGY: non-focal  SKIN: No rashes or lesions    LABS:                        11.5   6.19  )-----------( 186      ( 22 Feb 2021 07:37 )             36.7     02-22    131<L>  |  93<L>  |  66<H>  ----------------------------<  101<H>  5.1   |  22  |  6.17<H>    Ca    8.9      22 Feb 2021 07:37  Phos  4.8     02-22  Mg     2.4     02-22    TPro  7.8  /  Alb  3.7  /  TBili  0.4  /  DBili  x   /  AST  18  /  ALT  29  /  AlkPhos  119  02-21    PT/INR - ( 20 Feb 2021 16:46 )   PT: 11.2 sec;   INR: 0.98 ratio         PTT - ( 20 Feb 2021 16:46 )  PTT:21.1 sec          RADIOLOGY & ADDITIONAL TESTS:    Imaging Personally Reviewed:    Consultant(s) Notes Reviewed:      Care Discussed with Consultants/Other Providers:  
[facility] [place] [facility]   MD JEANNETTE CARRION DO ANAM SIDDIQUI ANGELA WONG, PA    TEL:  OFFICE: [phone]  DR ROSENBERG CELL: [phone]  DR. HANSEN CELL: [phone]  DR. MANDEL CELL: [phone]  YONNY WEBB CELL: [phone]    From 5pm-7am Answering Service [phone]    -- RENAL FOLLOW UP NOTE ---Date of Service 02-25-21 @ 13:54    Patient is a 66y old  Male who presents with a chief complaint of lt hand weakness (25 Feb 2021 13:36)      Patient seen and examined at bedside. No chest pain/sob    VITALS:  T(F): 98 (02-25-21 @ 13:16), Max: 98.1 (02-25-21 @ 05:25)  HR: 62 (02-25-21 @ 13:16)  BP: 110/42 (02-25-21 @ 13:16)  RR: 18 (02-25-21 @ 13:16)  SpO2: 100% (02-25-21 @ 13:16)  Wt(kg): --    02-24 @ 07:01  -  02-25 @ 07:00  --------------------------------------------------------  IN: 500 mL / OUT: 2000 mL / NET: -1500 mL          PHYSICAL EXAM:  Constitutional: NAD  Neck: No JVD  Respiratory: CTAB, no wheezes, rales or rhonchi  Cardiovascular: S1, S2, RRR  Gastrointestinal: BS+, soft, NT/ND  Extremities: b/l bka    Hospital Medications:   MEDICATIONS  (STANDING):  aspirin enteric coated 81 milliGRAM(s) Oral daily  atorvastatin 80 milliGRAM(s) Oral at bedtime  calcium acetate 667 milliGRAM(s) Oral three times a day with meals  dextrose 40% Gel 15 Gram(s) Oral once  dextrose 5%. 1000 milliLiter(s) (50 mL/Hr) IV Continuous <Continuous>  dextrose 5%. 1000 milliLiter(s) (100 mL/Hr) IV Continuous <Continuous>  dextrose 50% Injectable 25 Gram(s) IV Push once  dextrose 50% Injectable 12.5 Gram(s) IV Push once  dextrose 50% Injectable 25 Gram(s) IV Push once  glucagon  Injectable 1 milliGRAM(s) IntraMuscular once  heparin   Injectable 5000 Unit(s) SubCutaneous every 12 hours  insulin lispro (ADMELOG) corrective regimen sliding scale   SubCutaneous three times a day before meals  labetalol 200 milliGRAM(s) Oral <User Schedule>  lactulose Syrup 20 Gram(s) Oral at bedtime  latanoprost 0.005% Ophthalmic Solution 1 Drop(s) Both EYES at bedtime  NIFEdipine XL 60 milliGRAM(s) Oral at bedtime  pantoprazole  Injectable 40 milliGRAM(s) IV Push daily  prednisoLONE acetate 1% Suspension 1 Drop(s) Left EYE two times a day  senna 2 Tablet(s) Oral at bedtime      LABS:  02-24    132<L>  |  88<L>  |  60<H>  ----------------------------<  73  4.4   |  28  |  6.02<H>    Ca    8.6      24 Feb 2021 06:27  Phos  8.0     02-24  Mg     2.3     02-24      Creatinine Trend: 6.02 <--, 4.34 <--, 6.17 <--, 5.20 <--, 4.20 <--                                11.2   7.51  )-----------( 176      ( 24 Feb 2021 06:27 )             35.2     Urine Studies:      .3 (Ca --)      [04-01-20 @ 11:03]   --  HbA1c 4.2      [07-19-19 @ 09:56]  Lipid: chol 117, TG 63, HDL 44, LDL --      [02-21-21 @ 07:29]    HBsAb 26.2      [02-23-21 @ 10:09]  HBcAb Nonreact      [02-23-21 @ 10:09]  HCV 0.10, Nonreact      [02-23-21 @ 10:09]      RADIOLOGY & ADDITIONAL STUDIES:  
Patient complaining of left hand weakness. No chest pain, shortness of breath, palpitations or lightheadedness. No history of syncope.   Spoke with patient about the ILR.  All questions answered and he now is requesting the device.     Vital Signs Last 24 Hrs  T(C): 36.7 (25 Feb 2021 13:16), Max: 36.7 (25 Feb 2021 05:25)  T(F): 98 (25 Feb 2021 13:16), Max: 98.1 (25 Feb 2021 05:25)  HR: 62 (25 Feb 2021 13:16) (57 - 66)  BP: 110/42 (25 Feb 2021 13:16) (110/42 - 145/66)  BP(mean): --  RR: 18 (25 Feb 2021 13:16) (16 - 18)  SpO2: 100% (25 Feb 2021 13:16) (98% - 100%)      EKG  Telemetry:  Normal sinus rhythm    MEDICATIONS  (STANDING):  aspirin enteric coated 81 milliGRAM(s) Oral daily  atorvastatin 80 milliGRAM(s) Oral at bedtime  calcium acetate 667 milliGRAM(s) Oral three times a day with meals  dextrose 40% Gel 15 Gram(s) Oral once  dextrose 5%. 1000 milliLiter(s) (50 mL/Hr) IV Continuous <Continuous>  dextrose 5%. 1000 milliLiter(s) (100 mL/Hr) IV Continuous <Continuous>  dextrose 50% Injectable 25 Gram(s) IV Push once  dextrose 50% Injectable 12.5 Gram(s) IV Push once  dextrose 50% Injectable 25 Gram(s) IV Push once  glucagon  Injectable 1 milliGRAM(s) IntraMuscular once  heparin   Injectable 5000 Unit(s) SubCutaneous every 12 hours  insulin lispro (ADMELOG) corrective regimen sliding scale   SubCutaneous three times a day before meals  labetalol 200 milliGRAM(s) Oral <User Schedule>  lactulose Syrup 20 Gram(s) Oral at bedtime  latanoprost 0.005% Ophthalmic Solution 1 Drop(s) Both EYES at bedtime  NIFEdipine XL 60 milliGRAM(s) Oral at bedtime  pantoprazole  Injectable 40 milliGRAM(s) IV Push daily  prednisoLONE acetate 1% Suspension 1 Drop(s) Left EYE two times a day  senna 2 Tablet(s) Oral at bedtime    MEDICATIONS  (PRN):  acetaminophen   Tablet .. 650 milliGRAM(s) Oral every 6 hours PRN Temp greater or equal to 38C (100.4F), Mild Pain (1 - 3)          Physical exam:   Gen- patient awake, alert in NAD  Resp- clear to auscultation. No wheezing, rales or rhonchi  CV- S1 and S2 RRR. Grade 1-2/6 systolic murmur. No gallops or rubs  ABD- soft nontender + bowel sounds  EXT- right BKA  Left AKA  No edema  Neuro- grossly nonfocal                            11.2   7.51  )-----------( 176      ( 24 Feb 2021 06:27 )             35.2       02-24    132<L>  |  88<L>  |  60<H>  ----------------------------<  73  4.4   |  28  |  6.02<H>    Ca    8.6      24 Feb 2021 06:27  Phos  8.0     02-24  Mg     2.3     02-24                            
INTERVAL HPI/OVERNIGHT EVENTS:    denied any hiccups this morning   passing flatus      MEDICATIONS  (STANDING):  aspirin enteric coated 81 milliGRAM(s) Oral daily  atorvastatin 80 milliGRAM(s) Oral at bedtime  calcium acetate 667 milliGRAM(s) Oral three times a day with meals  dextrose 40% Gel 15 Gram(s) Oral once  dextrose 5%. 1000 milliLiter(s) (50 mL/Hr) IV Continuous <Continuous>  dextrose 5%. 1000 milliLiter(s) (100 mL/Hr) IV Continuous <Continuous>  dextrose 50% Injectable 25 Gram(s) IV Push once  dextrose 50% Injectable 12.5 Gram(s) IV Push once  dextrose 50% Injectable 25 Gram(s) IV Push once  glucagon  Injectable 1 milliGRAM(s) IntraMuscular once  heparin   Injectable 5000 Unit(s) SubCutaneous every 12 hours  insulin lispro (ADMELOG) corrective regimen sliding scale   SubCutaneous three times a day before meals  labetalol 200 milliGRAM(s) Oral <User Schedule>  lactulose Syrup 20 Gram(s) Oral at bedtime  latanoprost 0.005% Ophthalmic Solution 1 Drop(s) Both EYES at bedtime  NIFEdipine XL 60 milliGRAM(s) Oral at bedtime  pantoprazole  Injectable 40 milliGRAM(s) IV Push daily  prednisoLONE acetate 1% Suspension 1 Drop(s) Left EYE two times a day  senna 2 Tablet(s) Oral at bedtime    MEDICATIONS  (PRN):  acetaminophen   Tablet .. 650 milliGRAM(s) Oral every 6 hours PRN Temp greater or equal to 38C (100.4F), Mild Pain (1 - 3)      Allergies    No Known Allergies    Intolerances        Review of Systems:    General:  No wt loss, fevers, chills, night sweats, fatigue   Eyes:  Good vision, no reported pain  ENT:  No sore throat, pain, runny nose, dysphagia  CV:  No pain, palpitations, hypo/hypertension  Resp:  No dyspnea, cough, tachypnea, wheezing  GI:  No pain, No nausea, No vomiting, No diarrhea, No constipation, No weight loss, No fever, No pruritis, No rectal bleeding, No melena, No dysphagia  :  No pain, bleeding, incontinence, nocturia  Muscle:  No pain, weakness  Neuro:  No weakness, tingling, memory problems  Psych:  No fatigue, insomnia, mood problems, depression  Endocrine:  No polyuria, polydypsia, cold/heat intolerance  Heme:  No petechiae, ecchymosis, easy bruisability  Skin:  No rash, tattoos, scars, edema      Vital Signs Last 24 Hrs  T(C): 36.4 (26 Feb 2021 11:05), Max: 36.8 (25 Feb 2021 17:46)  T(F): 97.5 (26 Feb 2021 11:05), Max: 98.3 (25 Feb 2021 17:46)  HR: 58 (26 Feb 2021 11:05) (58 - 66)  BP: 136/53 (26 Feb 2021 11:05) (110/42 - 144/50)  BP(mean): --  RR: 16 (26 Feb 2021 11:05) (16 - 18)  SpO2: 100% (26 Feb 2021 11:05) (100% - 100%)    PHYSICAL EXAM:    Constitutional: NAD  HEENT: EOMI, throat clear  Neck: No LAD, supple  Respiratory: CTA and P  Cardiovascular: S1 and S2, RRR, no M  Gastrointestinal: BS+, soft, NT/ND, neg HSM,  Extremities: No peripheral edema, neg clubbing, cyanosis  Vascular: 2+ peripheral pulses  Neurological: A/O x 3, no focal deficits  Psychiatric: Normal mood, normal affect  Skin: No rashes      LABS:                        10.6   5.65  )-----------( 218      ( 26 Feb 2021 11:20 )             33.8     02-26    137  |  94<L>  |  64<H>  ----------------------------<  103<H>  4.4   |  26  |  6.70<H>    Ca    8.0<L>      26 Feb 2021 11:20  Phos  7.3     02-26  Mg     2.3     02-26    TPro  7.5  /  Alb  3.8  /  TBili  0.3  /  DBili  x   /  AST  14  /  ALT  21  /  AlkPhos  104  02-26          RADIOLOGY & ADDITIONAL TESTS:  
[facility] [place] [facility]   MD MARIBELL CARRION MD RUORU WONG, PA    TEL:  OFFICE: [phone]  DR ROSENBERG CELL: [phone]  SANJUANA WEBB CELL: [phone]  DR. MANDEL CELL: [phone]  DR. HANSEN CELL: [phone]    FROM 5 PM - 7 AM PLEASE CALL ANSWERING SERVICE: [phone]    RENAL FOLLOW UP NOTE--Date of Service 02-24-21 @ 07:52  --------------------------------------------------------------------------------  HPI:      Pt seen and examined at bedside.       PAST HISTORY  --------------------------------------------------------------------------------  No significant changes to PMH, PSH, FHx, SHx, unless otherwise noted    ALLERGIES & MEDICATIONS  --------------------------------------------------------------------------------  Allergies    No Known Allergies    Intolerances      Standing Inpatient Medications  aspirin enteric coated 81 milliGRAM(s) Oral daily  atorvastatin 80 milliGRAM(s) Oral at bedtime  calcium acetate 667 milliGRAM(s) Oral three times a day with meals  dextrose 40% Gel 15 Gram(s) Oral once  dextrose 5%. 1000 milliLiter(s) IV Continuous <Continuous>  dextrose 5%. 1000 milliLiter(s) IV Continuous <Continuous>  dextrose 50% Injectable 25 Gram(s) IV Push once  dextrose 50% Injectable 12.5 Gram(s) IV Push once  dextrose 50% Injectable 25 Gram(s) IV Push once  glucagon  Injectable 1 milliGRAM(s) IntraMuscular once  heparin   Injectable 5000 Unit(s) SubCutaneous every 12 hours  insulin lispro (ADMELOG) corrective regimen sliding scale   SubCutaneous three times a day before meals  labetalol 200 milliGRAM(s) Oral <User Schedule>  lactulose Syrup 20 Gram(s) Oral at bedtime  latanoprost 0.005% Ophthalmic Solution 1 Drop(s) Both EYES at bedtime  NIFEdipine XL 60 milliGRAM(s) Oral at bedtime  pantoprazole  Injectable 40 milliGRAM(s) IV Push daily  polyethylene glycol 3350 17 Gram(s) Oral two times a day  prednisoLONE acetate 1% Suspension 1 Drop(s) Left EYE two times a day  senna 2 Tablet(s) Oral at bedtime    PRN Inpatient Medications  acetaminophen   Tablet .. 650 milliGRAM(s) Oral every 6 hours PRN      REVIEW OF SYSTEMS  --------------------------------------------------------------------------------  General: no fever    MSK: no edema     VITALS/PHYSICAL EXAM  --------------------------------------------------------------------------------  T(C): 36.7 (02-24-21 @ 06:01), Max: 36.7 (02-23-21 @ 21:58)  HR: 60 (02-24-21 @ 06:01) (59 - 69)  BP: 125/57 (02-24-21 @ 06:01) (125/57 - 183/79)  RR: 16 (02-24-21 @ 06:01) (16 - 16)  SpO2: 96% (02-24-21 @ 06:01) (96% - 100%)  Wt(kg): --        Physical Exam:  	Gen: NAD  	HEENT: MMM  	Pulm: CTA B/L  	CV: S1S2  	Abd: Soft, +BS  	Ext: amputation B/L                      Neuro: Awake   	Skin: Warm and Dry   	Vascular access: avf          HARRIET cervantes  LABS/STUDIES  --------------------------------------------------------------------------------              11.2   7.51  >-----------<  176      [02-24-21 @ 06:27]              35.2     132  |  88  |  60  ----------------------------<  73      [02-24-21 @ 06:27]  4.4   |  28  |  6.02        Ca     8.6     [02-24-21 @ 06:27]      Mg     2.3     [02-24-21 @ 06:27]      Phos  8.0     [02-24-21 @ 06:27]            Creatinine Trend:  SCr 6.02 [02-24 @ 06:27]  SCr 4.34 [02-23 @ 08:02]  SCr 6.17 [02-22 @ 07:37]  SCr 5.20 [02-21 @ 07:29]  SCr 4.20 [02-20 @ 18:07]        .3 (Ca --)      [04-01-20 @ 11:03]   --  HbA1c 4.2      [07-19-19 @ 09:56]  Lipid: chol 117, TG 63, HDL 44, LDL --      [02-21-21 @ 07:29]    HBsAb 26.2      [02-23-21 @ 10:09]  HBcAb Nonreact      [02-23-21 @ 10:09]  HCV 0.10, Nonreact      [02-23-21 @ 10:09]    
Patient is a 66y old  Male who presents with a chief complaint of lt hand weakness  DATE OF SERVICE: 02-23-21 @ 13:15      HPI:  66yr old male presented to lt hand weakness.  Pt states that he noticed weakness on his left hand last night,but didnt tell anyone. He told the people at the rehab todAY so,he was sent to the ED.  His lt hand weakness is same as  yesterday.  denies loss of sensation.No slurry speech,blurry vision,double vision,facial droop  or weakness of other part of the body.  Denies headache,nausea.vomiting.   (20 Feb 2021 18:57)    2/23/2021  Left hand weakness +  Unable to tolerate MRI due to claustrophobia      PAST MEDICAL & SURGICAL HISTORY:  Kidney disease    HTN (hypertension)    DM (diabetes mellitus)    Wound  (chronic wounds to left foot and leg)    MRSA (methicillin resistant Staphylococcus aureus) colonization  (hx/o MRSA growth from wound culture)    CKD (chronic kidney disease)    Below knee amputation status, right    HTN (hypertension)    DM (diabetes mellitus)    Amputated right leg  below knee    Amputated left leg  above knee    H/O peripheral artery bypass  left fem to below-knee pop with RSVG    S/P BKA (below knee amputation) unilateral, right        Review of Systems:   CONSTITUTIONAL: No fever, weight loss, or fatigue  EYES: No eye pain, visual disturbances, or discharge  ENMT:  No difficulty hearing, tinnitus, vertigo; No sinus or throat pain  NECK: No pain or stiffness  BREASTS: No pain, masses, or nipple discharge  RESPIRATORY: No cough, wheezing, chills or hemoptysis; No shortness of breath  CARDIOVASCULAR: No chest pain, palpitations, dizziness, or leg swelling  GASTROINTESTINAL: No abdominal or epigastric pain. No nausea, vomiting, or hematemesis; No diarrhea or constipation. No melena or hematochezia.  GENITOURINARY: No dysuria, frequency, hematuria, or incontinence  NEUROLOGICAL: No headaches, memory loss, loss of strength, numbness, or tremors  SKIN: No itching, burning, rashes, or lesions   LYMPH NODES: No enlarged glands  ENDOCRINE: No heat or cold intolerance; No hair loss  MUSCULOSKELETAL: No joint pain or swelling; No muscle, back, or extremity pain  PSYCHIATRIC: No depression, anxiety, mood swings, or difficulty sleeping  HEME/LYMPH: No easy bruising, or bleeding gums  ALLERY AND IMMUNOLOGIC: No hives or eczema    Allergies    No Known Allergies    Intolerances        Social History:     FAMILY HISTORY:  Family history of diabetes mellitus        MEDICATIONS  (STANDING):  aspirin enteric coated 81 milliGRAM(s) Oral daily  atorvastatin 80 milliGRAM(s) Oral at bedtime  calcium acetate 667 milliGRAM(s) Oral three times a day with meals  dextrose 40% Gel 15 Gram(s) Oral once  dextrose 5%. 1000 milliLiter(s) (50 mL/Hr) IV Continuous <Continuous>  dextrose 5%. 1000 milliLiter(s) (100 mL/Hr) IV Continuous <Continuous>  dextrose 50% Injectable 25 Gram(s) IV Push once  dextrose 50% Injectable 12.5 Gram(s) IV Push once  dextrose 50% Injectable 25 Gram(s) IV Push once  glucagon  Injectable 1 milliGRAM(s) IntraMuscular once  heparin   Injectable 5000 Unit(s) SubCutaneous every 12 hours  insulin lispro (ADMELOG) corrective regimen sliding scale   SubCutaneous three times a day before meals  labetalol 200 milliGRAM(s) Oral <User Schedule>  lactulose Syrup 20 Gram(s) Oral at bedtime  latanoprost 0.005% Ophthalmic Solution 1 Drop(s) Both EYES at bedtime  prednisoLONE acetate 1% Suspension 1 Drop(s) Left EYE two times a day  senna 2 Tablet(s) Oral at bedtime    MEDICATIONS  (PRN):  acetaminophen   Tablet .. 650 milliGRAM(s) Oral every 6 hours PRN Temp greater or equal to 38C (100.4F), Mild Pain (1 - 3)        CAPILLARY BLOOD GLUCOSE      POCT Blood Glucose.: 102 mg/dL (22 Feb 2021 05:13)  POCT Blood Glucose.: 77 mg/dL (22 Feb 2021 03:48)  POCT Blood Glucose.: 113 mg/dL (21 Feb 2021 17:43)    I&O's Summary      PHYSICAL EXAM:  Vital Signs Last 24 Hrs  T(C): 36.8 (22 Feb 2021 05:23), Max: 36.8 (22 Feb 2021 05:23)  T(F): 98.3 (22 Feb 2021 05:23), Max: 98.3 (22 Feb 2021 05:23)  HR: 58 (22 Feb 2021 06:35) (58 - 61)  BP: 143/58 (22 Feb 2021 06:35) (143/58 - 213/76)  BP(mean): --  RR: 18 (22 Feb 2021 05:23) (18 - 18)  SpO2: 99% (22 Feb 2021 05:23) (96% - 99%)    GENERAL: NAD, well-developed  HEAD:  Atraumatic, Normocephalic  EYES: EOMI, PERRLA, conjunctiva and sclera clear  NECK: Supple, No JVD  CHEST/LUNG: Clear to auscultation bilaterally; No wheeze  HEART: Regular rate and rhythm; No murmurs, rubs, or gallops  ABDOMEN: Soft, Nontender, Nondistended; Bowel sounds present  EXTREMITIES:  2+ Peripheral Pulses, No clubbing, cyanosis, or edema  PSYCH: AAOx3  NEUROLOGY: non-focal  SKIN: No rashes or lesions    LABS:                        11.5   6.19  )-----------( 186      ( 22 Feb 2021 07:37 )             36.7     02-22    131<L>  |  93<L>  |  66<H>  ----------------------------<  101<H>  5.1   |  22  |  6.17<H>    Ca    8.9      22 Feb 2021 07:37  Phos  4.8     02-22  Mg     2.4     02-22    TPro  7.8  /  Alb  3.7  /  TBili  0.4  /  DBili  x   /  AST  18  /  ALT  29  /  AlkPhos  119  02-21    PT/INR - ( 20 Feb 2021 16:46 )   PT: 11.2 sec;   INR: 0.98 ratio         PTT - ( 20 Feb 2021 16:46 )  PTT:21.1 sec          RADIOLOGY & ADDITIONAL TESTS:    Imaging Personally Reviewed:    Consultant(s) Notes Reviewed:      Care Discussed with Consultants/Other Providers:  
Patient is a 66y old  Male who presents with a chief complaint of lt hand weakness  DATE OF SERVICE: 02-25-21 @ 13:37      HPI:  66yr old male presented to lt hand weakness.  Pt states that he noticed weakness on his left hand last night,but didnt tell anyone. He told the people at the rehab todAY so,he was sent to the ED.  His lt hand weakness is same as  yesterday.  denies loss of sensation.No slurry speech,blurry vision,double vision,facial droop  or weakness of other part of the body.  Denies headache,nausea.vomiting.   (20 Feb 2021 18:57)    2/25/2021  No new symptoms  Left hand weakness +  ILR planned      PAST MEDICAL & SURGICAL HISTORY:  Kidney disease    HTN (hypertension)    DM (diabetes mellitus)    Wound  (chronic wounds to left foot and leg)    MRSA (methicillin resistant Staphylococcus aureus) colonization  (hx/o MRSA growth from wound culture)    CKD (chronic kidney disease)    Below knee amputation status, right    HTN (hypertension)    DM (diabetes mellitus)    Amputated right leg  below knee    Amputated left leg  above knee    H/O peripheral artery bypass  left fem to below-knee pop with RSVG    S/P BKA (below knee amputation) unilateral, right        Review of Systems:   CONSTITUTIONAL: No fever, weight loss, or fatigue  EYES: No eye pain, visual disturbances, or discharge  ENMT:  No difficulty hearing, tinnitus, vertigo; No sinus or throat pain  NECK: No pain or stiffness  BREASTS: No pain, masses, or nipple discharge  RESPIRATORY: No cough, wheezing, chills or hemoptysis; No shortness of breath  CARDIOVASCULAR: No chest pain, palpitations, dizziness, or leg swelling  GASTROINTESTINAL: No abdominal or epigastric pain. No nausea, vomiting, or hematemesis; No diarrhea or constipation. No melena or hematochezia.  GENITOURINARY: No dysuria, frequency, hematuria, or incontinence  NEUROLOGICAL: No headaches, memory loss, loss of strength, numbness, or tremors  SKIN: No itching, burning, rashes, or lesions   LYMPH NODES: No enlarged glands  ENDOCRINE: No heat or cold intolerance; No hair loss  MUSCULOSKELETAL: No joint pain or swelling; No muscle, back, or extremity pain  PSYCHIATRIC: No depression, anxiety, mood swings, or difficulty sleeping  HEME/LYMPH: No easy bruising, or bleeding gums  ALLERY AND IMMUNOLOGIC: No hives or eczema    Allergies    No Known Allergies    Intolerances        Social History:     FAMILY HISTORY:  Family history of diabetes mellitus        MEDICATIONS  (STANDING):  aspirin enteric coated 81 milliGRAM(s) Oral daily  atorvastatin 80 milliGRAM(s) Oral at bedtime  calcium acetate 667 milliGRAM(s) Oral three times a day with meals  dextrose 40% Gel 15 Gram(s) Oral once  dextrose 5%. 1000 milliLiter(s) (50 mL/Hr) IV Continuous <Continuous>  dextrose 5%. 1000 milliLiter(s) (100 mL/Hr) IV Continuous <Continuous>  dextrose 50% Injectable 25 Gram(s) IV Push once  dextrose 50% Injectable 12.5 Gram(s) IV Push once  dextrose 50% Injectable 25 Gram(s) IV Push once  glucagon  Injectable 1 milliGRAM(s) IntraMuscular once  heparin   Injectable 5000 Unit(s) SubCutaneous every 12 hours  insulin lispro (ADMELOG) corrective regimen sliding scale   SubCutaneous three times a day before meals  labetalol 200 milliGRAM(s) Oral <User Schedule>  lactulose Syrup 20 Gram(s) Oral at bedtime  latanoprost 0.005% Ophthalmic Solution 1 Drop(s) Both EYES at bedtime  prednisoLONE acetate 1% Suspension 1 Drop(s) Left EYE two times a day  senna 2 Tablet(s) Oral at bedtime    MEDICATIONS  (PRN):  acetaminophen   Tablet .. 650 milliGRAM(s) Oral every 6 hours PRN Temp greater or equal to 38C (100.4F), Mild Pain (1 - 3)        CAPILLARY BLOOD GLUCOSE      POCT Blood Glucose.: 102 mg/dL (22 Feb 2021 05:13)  POCT Blood Glucose.: 77 mg/dL (22 Feb 2021 03:48)  POCT Blood Glucose.: 113 mg/dL (21 Feb 2021 17:43)    I&O's Summary      PHYSICAL EXAM:  Vital Signs Last 24 Hrs  T(C): 36.8 (22 Feb 2021 05:23), Max: 36.8 (22 Feb 2021 05:23)  T(F): 98.3 (22 Feb 2021 05:23), Max: 98.3 (22 Feb 2021 05:23)  HR: 58 (22 Feb 2021 06:35) (58 - 61)  BP: 143/58 (22 Feb 2021 06:35) (143/58 - 213/76)  BP(mean): --  RR: 18 (22 Feb 2021 05:23) (18 - 18)  SpO2: 99% (22 Feb 2021 05:23) (96% - 99%)    GENERAL: NAD, well-developed  HEAD:  Atraumatic, Normocephalic  EYES: EOMI, PERRLA, conjunctiva and sclera clear  NECK: Supple, No JVD  CHEST/LUNG: Clear to auscultation bilaterally; No wheeze  HEART: Regular rate and rhythm; No murmurs, rubs, or gallops  ABDOMEN: Soft, Nontender, Nondistended; Bowel sounds present  EXTREMITIES:  2+ Peripheral Pulses, No clubbing, cyanosis, or edema  PSYCH: AAOx3  NEUROLOGY: non-focal  SKIN: No rashes or lesions    LABS:                        11.5   6.19  )-----------( 186      ( 22 Feb 2021 07:37 )             36.7     02-22    131<L>  |  93<L>  |  66<H>  ----------------------------<  101<H>  5.1   |  22  |  6.17<H>    Ca    8.9      22 Feb 2021 07:37  Phos  4.8     02-22  Mg     2.4     02-22    TPro  7.8  /  Alb  3.7  /  TBili  0.4  /  DBili  x   /  AST  18  /  ALT  29  /  AlkPhos  119  02-21    PT/INR - ( 20 Feb 2021 16:46 )   PT: 11.2 sec;   INR: 0.98 ratio         PTT - ( 20 Feb 2021 16:46 )  PTT:21.1 sec          RADIOLOGY & ADDITIONAL TESTS:    Imaging Personally Reviewed:    Consultant(s) Notes Reviewed:      Care Discussed with Consultants/Other Providers:  
Patient is a 66y old  Male who presents with a chief complaint of lt hand weakness  DATE OF SERVICE: 02-23-21 @ 13:15      HPI:  66yr old male presented to lt hand weakness.  Pt states that he noticed weakness on his left hand last night,but didnt tell anyone. He told the people at the rehab todAY so,he was sent to the ED.  His lt hand weakness is same as  yesterday.  denies loss of sensation.No slurry speech,blurry vision,double vision,facial droop  or weakness of other part of the body.  Denies headache,nausea.vomiting.   (20 Feb 2021 18:57)    2/24/2021  No new symptoms  Left hand weakness +  Unable to tolerate MRI due to claustrophobia      PAST MEDICAL & SURGICAL HISTORY:  Kidney disease    HTN (hypertension)    DM (diabetes mellitus)    Wound  (chronic wounds to left foot and leg)    MRSA (methicillin resistant Staphylococcus aureus) colonization  (hx/o MRSA growth from wound culture)    CKD (chronic kidney disease)    Below knee amputation status, right    HTN (hypertension)    DM (diabetes mellitus)    Amputated right leg  below knee    Amputated left leg  above knee    H/O peripheral artery bypass  left fem to below-knee pop with RSVG    S/P BKA (below knee amputation) unilateral, right        Review of Systems:   CONSTITUTIONAL: No fever, weight loss, or fatigue  EYES: No eye pain, visual disturbances, or discharge  ENMT:  No difficulty hearing, tinnitus, vertigo; No sinus or throat pain  NECK: No pain or stiffness  BREASTS: No pain, masses, or nipple discharge  RESPIRATORY: No cough, wheezing, chills or hemoptysis; No shortness of breath  CARDIOVASCULAR: No chest pain, palpitations, dizziness, or leg swelling  GASTROINTESTINAL: No abdominal or epigastric pain. No nausea, vomiting, or hematemesis; No diarrhea or constipation. No melena or hematochezia.  GENITOURINARY: No dysuria, frequency, hematuria, or incontinence  NEUROLOGICAL: No headaches, memory loss, loss of strength, numbness, or tremors  SKIN: No itching, burning, rashes, or lesions   LYMPH NODES: No enlarged glands  ENDOCRINE: No heat or cold intolerance; No hair loss  MUSCULOSKELETAL: No joint pain or swelling; No muscle, back, or extremity pain  PSYCHIATRIC: No depression, anxiety, mood swings, or difficulty sleeping  HEME/LYMPH: No easy bruising, or bleeding gums  ALLERY AND IMMUNOLOGIC: No hives or eczema    Allergies    No Known Allergies    Intolerances        Social History:     FAMILY HISTORY:  Family history of diabetes mellitus        MEDICATIONS  (STANDING):  aspirin enteric coated 81 milliGRAM(s) Oral daily  atorvastatin 80 milliGRAM(s) Oral at bedtime  calcium acetate 667 milliGRAM(s) Oral three times a day with meals  dextrose 40% Gel 15 Gram(s) Oral once  dextrose 5%. 1000 milliLiter(s) (50 mL/Hr) IV Continuous <Continuous>  dextrose 5%. 1000 milliLiter(s) (100 mL/Hr) IV Continuous <Continuous>  dextrose 50% Injectable 25 Gram(s) IV Push once  dextrose 50% Injectable 12.5 Gram(s) IV Push once  dextrose 50% Injectable 25 Gram(s) IV Push once  glucagon  Injectable 1 milliGRAM(s) IntraMuscular once  heparin   Injectable 5000 Unit(s) SubCutaneous every 12 hours  insulin lispro (ADMELOG) corrective regimen sliding scale   SubCutaneous three times a day before meals  labetalol 200 milliGRAM(s) Oral <User Schedule>  lactulose Syrup 20 Gram(s) Oral at bedtime  latanoprost 0.005% Ophthalmic Solution 1 Drop(s) Both EYES at bedtime  prednisoLONE acetate 1% Suspension 1 Drop(s) Left EYE two times a day  senna 2 Tablet(s) Oral at bedtime    MEDICATIONS  (PRN):  acetaminophen   Tablet .. 650 milliGRAM(s) Oral every 6 hours PRN Temp greater or equal to 38C (100.4F), Mild Pain (1 - 3)        CAPILLARY BLOOD GLUCOSE      POCT Blood Glucose.: 102 mg/dL (22 Feb 2021 05:13)  POCT Blood Glucose.: 77 mg/dL (22 Feb 2021 03:48)  POCT Blood Glucose.: 113 mg/dL (21 Feb 2021 17:43)    I&O's Summary      PHYSICAL EXAM:  Vital Signs Last 24 Hrs  T(C): 36.8 (22 Feb 2021 05:23), Max: 36.8 (22 Feb 2021 05:23)  T(F): 98.3 (22 Feb 2021 05:23), Max: 98.3 (22 Feb 2021 05:23)  HR: 58 (22 Feb 2021 06:35) (58 - 61)  BP: 143/58 (22 Feb 2021 06:35) (143/58 - 213/76)  BP(mean): --  RR: 18 (22 Feb 2021 05:23) (18 - 18)  SpO2: 99% (22 Feb 2021 05:23) (96% - 99%)    GENERAL: NAD, well-developed  HEAD:  Atraumatic, Normocephalic  EYES: EOMI, PERRLA, conjunctiva and sclera clear  NECK: Supple, No JVD  CHEST/LUNG: Clear to auscultation bilaterally; No wheeze  HEART: Regular rate and rhythm; No murmurs, rubs, or gallops  ABDOMEN: Soft, Nontender, Nondistended; Bowel sounds present  EXTREMITIES:  2+ Peripheral Pulses, No clubbing, cyanosis, or edema  PSYCH: AAOx3  NEUROLOGY: non-focal  SKIN: No rashes or lesions    LABS:                        11.5   6.19  )-----------( 186      ( 22 Feb 2021 07:37 )             36.7     02-22    131<L>  |  93<L>  |  66<H>  ----------------------------<  101<H>  5.1   |  22  |  6.17<H>    Ca    8.9      22 Feb 2021 07:37  Phos  4.8     02-22  Mg     2.4     02-22    TPro  7.8  /  Alb  3.7  /  TBili  0.4  /  DBili  x   /  AST  18  /  ALT  29  /  AlkPhos  119  02-21    PT/INR - ( 20 Feb 2021 16:46 )   PT: 11.2 sec;   INR: 0.98 ratio         PTT - ( 20 Feb 2021 16:46 )  PTT:21.1 sec          RADIOLOGY & ADDITIONAL TESTS:    Imaging Personally Reviewed:    Consultant(s) Notes Reviewed:      Care Discussed with Consultants/Other Providers:  
Patient is a 66y old  Male who presents with a chief complaint of lt hand weakness  DATE OF SERVICE: 02-26-21 @ 13:37      HPI:  66yr old male presented to lt hand weakness.  Pt states that he noticed weakness on his left hand last night,but didnt tell anyone. He told the people at the rehab todAY so,he was sent to the ED.  His lt hand weakness is same as  yesterday.  denies loss of sensation.No slurry speech,blurry vision,double vision,facial droop  or weakness of other part of the body.  Denies headache,nausea.vomiting.   (20 Feb 2021 18:57)    2/26/2021  No new symptoms  Left hand weakness +  ILR planned      PAST MEDICAL & SURGICAL HISTORY:  Kidney disease    HTN (hypertension)    DM (diabetes mellitus)    Wound  (chronic wounds to left foot and leg)    MRSA (methicillin resistant Staphylococcus aureus) colonization  (hx/o MRSA growth from wound culture)    CKD (chronic kidney disease)    Below knee amputation status, right    HTN (hypertension)    DM (diabetes mellitus)    Amputated right leg  below knee    Amputated left leg  above knee    H/O peripheral artery bypass  left fem to below-knee pop with RSVG    S/P BKA (below knee amputation) unilateral, right        Review of Systems:   CONSTITUTIONAL: No fever, weight loss, or fatigue  EYES: No eye pain, visual disturbances, or discharge  ENMT:  No difficulty hearing, tinnitus, vertigo; No sinus or throat pain  NECK: No pain or stiffness  BREASTS: No pain, masses, or nipple discharge  RESPIRATORY: No cough, wheezing, chills or hemoptysis; No shortness of breath  CARDIOVASCULAR: No chest pain, palpitations, dizziness, or leg swelling  GASTROINTESTINAL: No abdominal or epigastric pain. No nausea, vomiting, or hematemesis; No diarrhea or constipation. No melena or hematochezia.  GENITOURINARY: No dysuria, frequency, hematuria, or incontinence  NEUROLOGICAL: No headaches, memory loss, loss of strength, numbness, or tremors  SKIN: No itching, burning, rashes, or lesions   LYMPH NODES: No enlarged glands  ENDOCRINE: No heat or cold intolerance; No hair loss  MUSCULOSKELETAL: No joint pain or swelling; No muscle, back, or extremity pain  PSYCHIATRIC: No depression, anxiety, mood swings, or difficulty sleeping  HEME/LYMPH: No easy bruising, or bleeding gums  ALLERY AND IMMUNOLOGIC: No hives or eczema    Allergies    No Known Allergies    Intolerances        Social History:     FAMILY HISTORY:  Family history of diabetes mellitus        MEDICATIONS  (STANDING):  aspirin enteric coated 81 milliGRAM(s) Oral daily  atorvastatin 80 milliGRAM(s) Oral at bedtime  calcium acetate 667 milliGRAM(s) Oral three times a day with meals  dextrose 40% Gel 15 Gram(s) Oral once  dextrose 5%. 1000 milliLiter(s) (50 mL/Hr) IV Continuous <Continuous>  dextrose 5%. 1000 milliLiter(s) (100 mL/Hr) IV Continuous <Continuous>  dextrose 50% Injectable 25 Gram(s) IV Push once  dextrose 50% Injectable 12.5 Gram(s) IV Push once  dextrose 50% Injectable 25 Gram(s) IV Push once  glucagon  Injectable 1 milliGRAM(s) IntraMuscular once  heparin   Injectable 5000 Unit(s) SubCutaneous every 12 hours  insulin lispro (ADMELOG) corrective regimen sliding scale   SubCutaneous three times a day before meals  labetalol 200 milliGRAM(s) Oral <User Schedule>  lactulose Syrup 20 Gram(s) Oral at bedtime  latanoprost 0.005% Ophthalmic Solution 1 Drop(s) Both EYES at bedtime  prednisoLONE acetate 1% Suspension 1 Drop(s) Left EYE two times a day  senna 2 Tablet(s) Oral at bedtime    MEDICATIONS  (PRN):  acetaminophen   Tablet .. 650 milliGRAM(s) Oral every 6 hours PRN Temp greater or equal to 38C (100.4F), Mild Pain (1 - 3)        CAPILLARY BLOOD GLUCOSE      POCT Blood Glucose.: 102 mg/dL (22 Feb 2021 05:13)  POCT Blood Glucose.: 77 mg/dL (22 Feb 2021 03:48)  POCT Blood Glucose.: 113 mg/dL (21 Feb 2021 17:43)    I&O's Summary      PHYSICAL EXAM:  Vital Signs Last 24 Hrs  T(C): 36.8 (22 Feb 2021 05:23), Max: 36.8 (22 Feb 2021 05:23)  T(F): 98.3 (22 Feb 2021 05:23), Max: 98.3 (22 Feb 2021 05:23)  HR: 58 (22 Feb 2021 06:35) (58 - 61)  BP: 143/58 (22 Feb 2021 06:35) (143/58 - 213/76)  BP(mean): --  RR: 18 (22 Feb 2021 05:23) (18 - 18)  SpO2: 99% (22 Feb 2021 05:23) (96% - 99%)    GENERAL: NAD, well-developed  HEAD:  Atraumatic, Normocephalic  EYES: EOMI, PERRLA, conjunctiva and sclera clear  NECK: Supple, No JVD  CHEST/LUNG: Clear to auscultation bilaterally; No wheeze  HEART: Regular rate and rhythm; No murmurs, rubs, or gallops  ABDOMEN: Soft, Nontender, Nondistended; Bowel sounds present  EXTREMITIES:  2+ Peripheral Pulses, No clubbing, cyanosis, or edema  PSYCH: AAOx3  NEUROLOGY: non-focal  SKIN: No rashes or lesions    LABS:                        11.5   6.19  )-----------( 186      ( 22 Feb 2021 07:37 )             36.7     02-22    131<L>  |  93<L>  |  66<H>  ----------------------------<  101<H>  5.1   |  22  |  6.17<H>    Ca    8.9      22 Feb 2021 07:37  Phos  4.8     02-22  Mg     2.4     02-22    TPro  7.8  /  Alb  3.7  /  TBili  0.4  /  DBili  x   /  AST  18  /  ALT  29  /  AlkPhos  119  02-21    PT/INR - ( 20 Feb 2021 16:46 )   PT: 11.2 sec;   INR: 0.98 ratio         PTT - ( 20 Feb 2021 16:46 )  PTT:21.1 sec          RADIOLOGY & ADDITIONAL TESTS:    Imaging Personally Reviewed:    Consultant(s) Notes Reviewed:      Care Discussed with Consultants/Other Providers:

## 2021-02-26 NOTE — DISCHARGE NOTE NURSING/CASE MANAGEMENT/SOCIAL WORK - PATIENT PORTAL LINK FT
You can access the FollowMyHealth Patient Portal offered by [facility] by registering at the following website: [email or web address]. By joining [facility]’s FollowMyHealth portal, you will also be able to view your health information using other applications (apps) compatible with our system.

## 2021-02-26 NOTE — PROGRESS NOTE ADULT - PROBLEM SELECTOR PLAN 1
Pt with lt hand weakness.  CT head-neg  seen by Neuro  CTA ordered. If findings are unsatisfactory, then will need MRI with anesthesia help.
Pt with lt hand weakness.  seen by Neuro  CTAfindings noted, embolic stroke. EP for ILR placement
nonobstructive   cont lactulose  senna qhs   miralax bid vs enema PRN if no relief with above
Pt with lt hand weakness.  seen by Neuro  CTA findings noted,   embolic stroke. EP for ILR placement
Pt with lt hand weakness.  CT head-neg  seen by Neuro in ED, needs a FU  cont ASA,  lipitor 80mg daily  -TTE w/ bubble study, continuous cardiac telemetry to monitor for arrhythmia  PT/OT
Pt with lt hand weakness.  seen by Neuro  CTA findings noted,   embolic stroke. EP for ILR placement

## 2021-02-26 NOTE — PROGRESS NOTE ADULT - PROBLEM SELECTOR PROBLEM 1
Constipation
Acute CVA (cerebrovascular accident)

## 2021-02-26 NOTE — CHART NOTE - NSCHARTNOTEFT_GEN_A_CORE
Briefly, this is a 66y R-handed M with h/o ESRD on HD (M/W/F), HTN, DM, MRSA colonization, R. BKA, L. AKA and multiple chronic embolic infarcts (B/L occipital, B/L cerebellar, L. temporal) with residual visual impairment p/w acute onset left hypoesthetic hemiparesis after most recent HD session, improving but not back to baseline. CTH with noted chronic infarcts in the bilateral medial occipital lobes and bilateral cerebellum, chronic infarcts involving the bilateral thalami and R desire. CTA with noted atherosclerotic disease and occlusion of the bilateral parieto-occipital PCA branches, severe proximal PCA stenosis and mild stenosis of the distal bilateral MCA and DENISE branches. TTE noting moderately dilated left atrium, moderate LVH, moderate dysastolic dysfunction, normal LV function, inconclusive PFO. Patient with noted L pronator drift, L nasolabial fold flattening, decreased sensation to LT on the L. A1c 5.1, LDL 60.    IMPRESSION: Acute onset LUE hypoesthetic hemiparesis due to recrudesnce from HD related acute effect vs. new R. thalamocapsular/ R. cortical infarct suspected mechanism .  Additionally, PT with chronic B/L severe visual deficits, B/L hemianopsia due to B/L chronic occipital infarcts and B/L ataxia on FTN either due to visual impairment induced balint syndrome vs. B/L chronic cerebellar infarcts.  Previous infarcts likely mechanism of ESUS.     PLAN:  [x] maintain normotension and avoid hypotension  [] at this time can defer MRI as it will not   [x] TTE as above  [] would place ILR as patient with L atrium dilation and numerous infarcts with artery occlusions to investigate for possible underlying afib. If Afib found then would start anticoagulation  [x] continue aspirin 81mg  [] no role for Plavix at this time  [] continue atorvastatin 80mg   [] DVT PPx  [x] A1c 5.1, LDL 60  [] may follow up with outpatient neurology, Dr. Zuluaga, at [address] ([phone])    No further neurological workup inpatient. Neurology to sign off. Please recall with any questions, Spectra [id number], after 430 or on weekends [id number]    Case discussed with stroke neurology attending, Dr. Zuluaga.

## 2021-02-26 NOTE — CHART NOTE - NSCHARTNOTEFT_GEN_A_CORE
ELECTROPHYSIOLOGY      Patient s/p implantation of ILR yesterday.  Tolerated the procedure well. No complications.   Vital signs stable.  Telemetry:  No evidence of atrial fibrillation.   Dressing dry and intact.   ILR teaching done. Written instructions and contact information provided.   He was given a home monitor with verbal and written instruction to be given to the nursing home staff.   H has a follow up appointment in the device clinic on Wednesday 3/10/21 at 10:45am  4th floor Oncology [facility] [phone].

## 2021-02-26 NOTE — PROGRESS NOTE ADULT - PROBLEM SELECTOR PROBLEM 5
Glaucoma of both eyes, unspecified glaucoma type

## 2021-02-26 NOTE — PROGRESS NOTE ADULT - PROBLEM SELECTOR PLAN 2
Permissive HTN
Permissive HTN  Cardiology to evaluate
Permissive HTN
Permissive HTN
resolved  ppi qd for reflux   thorazine prn
Permissive HTN

## 2021-03-10 ENCOUNTER — APPOINTMENT (OUTPATIENT)
Dept: ELECTROPHYSIOLOGY | Facility: CLINIC | Age: 66
End: 2021-03-10
Payer: MEDICAID

## 2021-03-10 VITALS — DIASTOLIC BLOOD PRESSURE: 63 MMHG | SYSTOLIC BLOOD PRESSURE: 145 MMHG

## 2021-03-10 PROCEDURE — 93285 PRGRMG DEV EVAL SCRMS IP: CPT

## 2021-03-11 RX ORDER — MULTIVITAMIN
TABLET ORAL
Refills: 0 | Status: DISCONTINUED | COMMUNITY
End: 2021-03-11

## 2021-03-11 RX ORDER — LACTULOSE 10 G/15ML
20 SOLUTION ORAL DAILY
Refills: 0 | Status: ACTIVE | COMMUNITY

## 2021-03-11 RX ORDER — OXYCODONE HYDROCHLORIDE AND ACETAMINOPHEN 5; 325 MG/1; MG/1
5-325 TABLET ORAL
Refills: 0 | Status: DISCONTINUED | COMMUNITY
End: 2021-03-11

## 2021-03-11 RX ORDER — NIFEDIPINE 60 MG/1
60 TABLET, FILM COATED, EXTENDED RELEASE ORAL
Refills: 0 | Status: DISCONTINUED | COMMUNITY
End: 2021-03-11

## 2021-03-11 RX ORDER — PNV NO.95/FERROUS FUM/FOLIC AC 28MG-0.8MG
TABLET ORAL
Refills: 0 | Status: DISCONTINUED | COMMUNITY
End: 2021-03-11

## 2021-03-11 RX ORDER — CALCIUM ACETATE 667 MG
667 TABLET ORAL
Refills: 0 | Status: ACTIVE | COMMUNITY

## 2021-03-11 RX ORDER — OMEPRAZOLE 20 MG/1
20 TABLET, DELAYED RELEASE ORAL
Refills: 0 | Status: DISCONTINUED | COMMUNITY
End: 2021-03-11

## 2021-03-11 RX ORDER — SITAGLIPTIN 25 MG/1
25 TABLET, FILM COATED ORAL
Refills: 0 | Status: DISCONTINUED | COMMUNITY
End: 2021-03-11

## 2021-03-11 RX ORDER — LORATADINE 5 MG
17 TABLET,CHEWABLE ORAL
Refills: 0 | Status: DISCONTINUED | COMMUNITY
End: 2021-03-11

## 2021-03-11 RX ORDER — NITROFURANTOIN MACROCRYSTALS 100 MG/1
100 CAPSULE ORAL
Refills: 0 | Status: DISCONTINUED | COMMUNITY
End: 2021-03-11

## 2021-03-11 RX ORDER — METOCLOPRAMIDE HYDROCHLORIDE 5 MG/1
5 TABLET ORAL
Refills: 0 | Status: DISCONTINUED | COMMUNITY
End: 2021-03-11

## 2021-03-11 RX ORDER — METOLAZONE 2.5 MG
2.5 TABLET ORAL
Refills: 0 | Status: DISCONTINUED | COMMUNITY
End: 2021-03-11

## 2021-03-11 RX ORDER — ATORVASTATIN CALCIUM 40 MG/1
40 TABLET, FILM COATED ORAL
Refills: 0 | Status: DISCONTINUED | COMMUNITY
End: 2021-03-11

## 2021-03-11 RX ORDER — TAMSULOSIN HYDROCHLORIDE 0.4 MG/1
0.4 CAPSULE ORAL
Refills: 0 | Status: DISCONTINUED | COMMUNITY
End: 2021-03-11

## 2021-03-11 RX ORDER — HYDRALAZINE HYDROCHLORIDE 10 MG/1
10 TABLET ORAL
Refills: 0 | Status: DISCONTINUED | COMMUNITY
End: 2021-03-11

## 2021-03-11 RX ORDER — PENTOXIFYLLINE 400 MG/1
400 TABLET, EXTENDED RELEASE ORAL
Refills: 0 | Status: DISCONTINUED | COMMUNITY
End: 2021-03-11

## 2021-03-11 RX ORDER — FUROSEMIDE 40 MG/1
40 TABLET ORAL
Refills: 0 | Status: DISCONTINUED | COMMUNITY
End: 2021-03-11

## 2021-03-11 RX ORDER — ASPIRIN 81 MG
81 TABLET, DELAYED RELEASE (ENTERIC COATED) ORAL DAILY
Refills: 0 | Status: ACTIVE | COMMUNITY

## 2021-03-11 RX ORDER — AMLODIPINE BESYLATE 10 MG/1
10 TABLET ORAL
Refills: 0 | Status: DISCONTINUED | COMMUNITY
End: 2021-03-11

## 2021-03-11 RX ORDER — ERYTHROPOIETIN 10000 [IU]/ML
10000 INJECTION, SOLUTION INTRAVENOUS; SUBCUTANEOUS
Refills: 0 | Status: DISCONTINUED | COMMUNITY
End: 2021-03-11

## 2021-03-11 RX ORDER — KETOROLAC TROMETHAMINE 5 MG/ML
0.5 SOLUTION OPHTHALMIC
Qty: 5 | Refills: 0 | Status: DISCONTINUED | COMMUNITY
Start: 2018-01-17 | End: 2021-03-11

## 2021-04-09 NOTE — STROKE CODE NOTE - NSSTROKETPAEXCLABS_TIMEWINDOW
Patient is outside the appropriate time window for IV Alteplase Path Notes (To The Dermatopathologist): Path: [id number]\\nNguyen \\nStitched : 1:00\\n1cm + 5mm margins \\nPlease check margins. Path Notes (To The Dermatopathologist): Path: [id number]\\nNguyen \\nStitched : 1:00\\n1cm + 5mm margins \\nPlease check margins.

## 2021-04-12 ENCOUNTER — NON-APPOINTMENT (OUTPATIENT)
Age: 66
End: 2021-04-12

## 2021-04-12 ENCOUNTER — APPOINTMENT (OUTPATIENT)
Dept: ELECTROPHYSIOLOGY | Facility: CLINIC | Age: 66
End: 2021-04-12
Payer: MEDICAID

## 2021-04-12 PROCEDURE — 93298 REM INTERROG DEV EVAL SCRMS: CPT

## 2021-04-12 PROCEDURE — G2066: CPT | Mod: NC

## 2021-04-22 NOTE — PATIENT PROFILE ADULT. - FUNCTIONAL SCREEN CURRENT LEVEL: COMMUNICATION, MLM
Patient/Caregiver provided printed discharge information.
(0) understands/communicates without difficulty

## 2021-05-17 ENCOUNTER — APPOINTMENT (OUTPATIENT)
Dept: ELECTROPHYSIOLOGY | Facility: CLINIC | Age: 66
End: 2021-05-17
Payer: MEDICAID

## 2021-05-17 ENCOUNTER — NON-APPOINTMENT (OUTPATIENT)
Age: 66
End: 2021-05-17

## 2021-05-17 PROCEDURE — G2066: CPT | Mod: NC

## 2021-05-17 PROCEDURE — 93298 REM INTERROG DEV EVAL SCRMS: CPT

## 2021-06-21 ENCOUNTER — APPOINTMENT (OUTPATIENT)
Dept: ELECTROPHYSIOLOGY | Facility: CLINIC | Age: 66
End: 2021-06-21
Payer: MEDICAID

## 2021-06-21 ENCOUNTER — NON-APPOINTMENT (OUTPATIENT)
Age: 66
End: 2021-06-21

## 2021-06-21 PROCEDURE — G2066: CPT | Mod: NC

## 2021-06-21 PROCEDURE — 93298 REM INTERROG DEV EVAL SCRMS: CPT

## 2021-07-09 NOTE — PROVIDER CONTACT NOTE (CRITICAL VALUE NOTIFICATION) - NAME OF MD/NP/PA/DO NOTIFIED:
Joshua
Darian Hernandez MD [id number]
[facility]
 (C Team)
Dr. Galvez
Dr. Ramsay
[facility] team; Reynold Mcmillan
day(s)

## 2021-07-09 NOTE — PATIENT PROFILE ADULT - PSYCHOSOCIAL CONCERNS
Infusion Nursing Note:  Taran Regalado presents today for Taxol/Carbo - NOT GIVEN - Zarxio given instead.   Patient seen by provider today: No   present during visit today: Not Applicable.    Note: Pt did not meet parameters for chemotherapy today. Per Ella NELSON NP give Zarxio today.    Intravenous Access:  N/A.    Treatment Conditions:  Lab Results   Component Value Date    HGB 10.4 07/09/2021     Lab Results   Component Value Date    WBC 2.7 07/09/2021      Lab Results   Component Value Date    ANEU 0.9 07/09/2021     Lab Results   Component Value Date     07/09/2021      Lab Results   Component Value Date     07/09/2021                   Lab Results   Component Value Date    POTASSIUM 4.1 07/09/2021           Lab Results   Component Value Date    MAG 2.2 07/09/2021            Lab Results   Component Value Date    CR 0.86 07/09/2021                   Lab Results   Component Value Date    HORACIO 8.8 07/09/2021                Lab Results   Component Value Date    BILITOTAL 0.4 07/09/2021           Lab Results   Component Value Date    ALBUMIN 3.7 07/09/2021                    Lab Results   Component Value Date    ALT 26 07/09/2021           Lab Results   Component Value Date    AST 20 07/09/2021       Post Infusion Assessment:  Patient tolerated injection without incident.      Discharge Plan:   Patient will return 7/12 for next appointment.   Patient discharged in stable condition accompanied by: self.  Departure Mode: Ambulatory.      Jossy Schwartz RN                       none

## 2021-07-12 NOTE — PATIENT PROFILE ADULT - PRIMARY SOURCE OF SUPPORT/COMFORT
PATIENT INFORMATION    Anticipatory guidance discussed  Bicycle helmets  Importance of regular exercise  Importance of varied diet    Follow-Up  - Return in 1 year for your yearly well visit.    13-17 years old Health and Safety Tips - The following hyperlinks are available to access via [employer]    Parent Education from [employer]    Educación para padres sobre niños sanos    Additional Educational Resources:  For additional resources regarding your symptoms, diagnosis, or further health information, please visit the Discover a Healthier You section on [email or web address] or the Online Health Resources section in [employer].  
sibling(s)

## 2021-07-26 ENCOUNTER — NON-APPOINTMENT (OUTPATIENT)
Age: 66
End: 2021-07-26

## 2021-07-26 ENCOUNTER — APPOINTMENT (OUTPATIENT)
Dept: ELECTROPHYSIOLOGY | Facility: CLINIC | Age: 66
End: 2021-07-26
Payer: MEDICAID

## 2021-07-26 PROCEDURE — 93298 REM INTERROG DEV EVAL SCRMS: CPT

## 2021-07-26 PROCEDURE — G2066: CPT | Mod: NC

## 2021-08-12 NOTE — PROGRESS NOTE ADULT - SUBJECTIVE AND OBJECTIVE BOX
Continues on iv steroid, iv Benadryl, iv Pepcid. For 2D echo. Awaiting medical plan regarding dermatology consult- no available physician is able to see pt @ [facility]. Plan remains to return home w/ wife on discharge. No needs.  Will follow Lopez Goodson RN case manager VASCULAR DAILY PROGRESS NOTE:     Subjective: Pt seen this AM. Pain controlled. Cr continues to trend up.         Objective:    PE:  Gen: NAD  Resp: Respirations unlabored   CVS: RRR  Abd: soft, ND, NT, no rebound or guarding  Ext: plantar foot wound dry and stable, well adhered. Toe 3-5 eschars peeled off with red healthy tissue underneath, mild oozing from plantar foot, no active bleeding, no purulence, no signs of infection    Vital Signs Last 24 Hrs  T(C): 36.7 (2018 09:41), Max: 36.9 (10 Apr 2018 13:35)  T(F): 98 (2018 09:41), Max: 98.5 (10 Apr 2018 13:35)  HR: 63 (2018 09:41) (61 - 69)  BP: 147/75 (2018 09:41) (128/54 - 147/75)  BP(mean): --  RR: 17 (2018 09:41) (16 - 18)  SpO2: 96% (2018 09:41) (94% - 99%)    I&O's Detail    10 Apr 2018 07:01  -  2018 07:00  --------------------------------------------------------  IN:    sodium chloride 0.9%.: 450 mL  Total IN: 450 mL    OUT:    Indwelling Catheter - Urethral: 375 mL    Intermittent Catheterization - Urethral: 450 mL  Total OUT: 825 mL    Total NET: -375 mL          Daily     Daily Weight in k.4 (2018 01:44)    MEDICATIONS  (STANDING):  aspirin enteric coated 81 milliGRAM(s) Oral daily  atorvastatin 80 milliGRAM(s) Oral at bedtime  calcium acetate 1334 milliGRAM(s) Oral three times a day with meals  collagenase Ointment 1 Application(s) Topical daily  cyanocobalamin 1000 MICROGram(s) Oral daily  Dakins Solution - 1/2 Strength 1 Application(s) Topical daily  dextrose 50% Injectable 25 Gram(s) IV Push once  dextrose 50% Injectable 25 Gram(s) IV Push once  famotidine    Tablet 20 milliGRAM(s) Oral daily  furosemide   Injectable 20 milliGRAM(s) IV Push once  heparin  Injectable 5000 Unit(s) SubCutaneous every 8 hours  insulin lispro (HumaLOG) corrective regimen sliding scale   SubCutaneous three times a day before meals  labetalol 200 milliGRAM(s) Oral three times a day  multivitamin 1 Tablet(s) Oral daily  NIFEdipine XL 90 milliGRAM(s) Oral daily  oxyCODONE  ER Tablet 10 milliGRAM(s) Oral every 12 hours  pentoxifylline 400 milliGRAM(s) Oral three times a day  polyethylene glycol 3350 17 Gram(s) Oral daily  silver nitrate Applicator 1 Application(s) Topical once  sodium chloride 0.9%. 1000 milliLiter(s) (75 mL/Hr) IV Continuous <Continuous>  tamsulosin 0.4 milliGRAM(s) Oral at bedtime    MEDICATIONS  (PRN):  acetaminophen   Tablet. 650 milliGRAM(s) Oral every 6 hours PRN Moderate Pain (4 - 6)  dextrose Gel 1 Dose(s) Oral once PRN Blood Glucose LESS THAN 70 milliGRAM(s)/deciliter  glucagon  Injectable 1 milliGRAM(s) IntraMuscular once PRN Glucose LESS THAN 70 milligrams/deciliter  oxyCODONE    5 mG/acetaminophen 325 mG 1 Tablet(s) Oral every 4 hours PRN Moderate Pain      LABS:                        7.9    9.20  )-----------( 297      ( 2018 07:15 )             24.5     04-11    139  |  104  |  81<H>  ----------------------------<  119<H>  4.8   |  18<L>  |  6.50<H>    Ca    8.2<L>      2018 07:15  Phos  6.2     04-11  Mg     2.4     04-11        Urinalysis Basic - ( 10 Apr 2018 16:00 )    Color: YELLOW / Appearance: HAZY / S.021 / pH: 5.5  Gluc: NEGATIVE / Ketone: NEGATIVE  / Bili: NEGATIVE / Urobili: NORMAL mg/dL   Blood: TRACE / Protein: 100 mg/dL / Nitrite: NEGATIVE   Leuk Esterase: NEGATIVE / RBC: 2-5 / WBC 10-25   Sq Epi: x / Non Sq Epi: x / Bacteria: x        RADIOLOGY & ADDITIONAL STUDIES: VASCULAR DAILY PROGRESS NOTE:     Subjective: Pt seen this AM. Pain controlled. Cr continues to trend up.         Objective: pt w/o c/o     PE:  Gen: NAD  Resp: Respirations unlabored   CVS: RRR  Abd: soft, ND, NT, no rebound or guarding  Ext: plantar foot wound dry and stable, well adhered. Toe 3-5 eschars peeled off with red healthy tissue underneath, mild oozing from plantar foot, no active bleeding, no purulence, no signs of infection    Vital Signs Last 24 Hrs  T(C): 36.7 (2018 09:41), Max: 36.9 (10 Apr 2018 13:35)  T(F): 98 (2018 09:41), Max: 98.5 (10 Apr 2018 13:35)  HR: 63 (2018 09:41) (61 - 69)  BP: 147/75 (2018 09:41) (128/54 - 147/75)  BP(mean): --  RR: 17 (2018 09:41) (16 - 18)  SpO2: 96% (2018 09:41) (94% - 99%)    I&O's Detail    10 Apr 2018 07:01  -  2018 07:00  --------------------------------------------------------  IN:    sodium chloride 0.9%.: 450 mL  Total IN: 450 mL    OUT:    Indwelling Catheter - Urethral: 375 mL    Intermittent Catheterization - Urethral: 450 mL  Total OUT: 825 mL    Total NET: -375 mL    Daily Weight in k.4 (2018 01:44)    MEDICATIONS  (STANDING):  aspirin enteric coated 81 milliGRAM(s) Oral daily  atorvastatin 80 milliGRAM(s) Oral at bedtime  calcium acetate 1334 milliGRAM(s) Oral three times a day with meals  collagenase Ointment 1 Application(s) Topical daily  cyanocobalamin 1000 MICROGram(s) Oral daily  Dakins Solution - 1/2 Strength 1 Application(s) Topical daily  dextrose 50% Injectable 25 Gram(s) IV Push once  dextrose 50% Injectable 25 Gram(s) IV Push once  famotidine    Tablet 20 milliGRAM(s) Oral daily  furosemide   Injectable 20 milliGRAM(s) IV Push once  heparin  Injectable 5000 Unit(s) SubCutaneous every 8 hours  insulin lispro (HumaLOG) corrective regimen sliding scale   SubCutaneous three times a day before meals  labetalol 200 milliGRAM(s) Oral three times a day  multivitamin 1 Tablet(s) Oral daily  NIFEdipine XL 90 milliGRAM(s) Oral daily  oxyCODONE  ER Tablet 10 milliGRAM(s) Oral every 12 hours  pentoxifylline 400 milliGRAM(s) Oral three times a day  polyethylene glycol 3350 17 Gram(s) Oral daily  silver nitrate Applicator 1 Application(s) Topical once  sodium chloride 0.9%. 1000 milliLiter(s) (75 mL/Hr) IV Continuous <Continuous>  tamsulosin 0.4 milliGRAM(s) Oral at bedtime    MEDICATIONS  (PRN):  acetaminophen   Tablet. 650 milliGRAM(s) Oral every 6 hours PRN Moderate Pain (4 - 6)  dextrose Gel 1 Dose(s) Oral once PRN Blood Glucose LESS THAN 70 milliGRAM(s)/deciliter  glucagon  Injectable 1 milliGRAM(s) IntraMuscular once PRN Glucose LESS THAN 70 milligrams/deciliter  oxyCODONE    5 mG/acetaminophen 325 mG 1 Tablet(s) Oral every 4 hours PRN Moderate Pain      LABS:                        7.9    9.20  )-----------( 297      ( 2018 07:15 )             24.5     04-11    139  |  104  |  81<H>  ----------------------------<  119<H>  4.8   |  18<L>  |  6.50<H>    Ca    8.2<L>      2018 07:15  Phos  6.2     04-11  Mg     2.4     04-11        Urinalysis Basic - ( 10 Apr 2018 16:00 )    Color: YELLOW / Appearance: HAZY / S.021 / pH: 5.5  Gluc: NEGATIVE / Ketone: NEGATIVE  / Bili: NEGATIVE / Urobili: NORMAL mg/dL   Blood: TRACE / Protein: 100 mg/dL / Nitrite: NEGATIVE   Leuk Esterase: NEGATIVE / RBC: 2-5 / WBC 10-25   Sq Epi: x / Non Sq Epi: x / Bacteria: x

## 2021-08-30 ENCOUNTER — APPOINTMENT (OUTPATIENT)
Dept: ELECTROPHYSIOLOGY | Facility: CLINIC | Age: 66
End: 2021-08-30
Payer: MEDICAID

## 2021-08-30 ENCOUNTER — NON-APPOINTMENT (OUTPATIENT)
Age: 66
End: 2021-08-30

## 2021-08-30 PROCEDURE — G2066: CPT | Mod: NC

## 2021-08-30 PROCEDURE — 93298 REM INTERROG DEV EVAL SCRMS: CPT

## 2021-10-04 ENCOUNTER — APPOINTMENT (OUTPATIENT)
Dept: ELECTROPHYSIOLOGY | Facility: CLINIC | Age: 66
End: 2021-10-04
Payer: MEDICAID

## 2021-10-04 ENCOUNTER — NON-APPOINTMENT (OUTPATIENT)
Age: 66
End: 2021-10-04

## 2021-10-04 PROCEDURE — G2066: CPT | Mod: NC

## 2021-10-04 PROCEDURE — 93298 REM INTERROG DEV EVAL SCRMS: CPT

## 2021-10-10 NOTE — PROGRESS NOTE ADULT - SUBJECTIVE AND OBJECTIVE BOX
Follow Up:      Interval History/ROS: reports anorexia and malaise    Allergies  No Known Allergies    ANTIMICROBIALS:  ampicillin/sulbactam  IVPB 3 every 12 hours    OTHER MEDS:  MEDICATIONS  (STANDING):  acetaminophen   Tablet. 650 every 6 hours PRN  aspirin enteric coated 81 daily  dextrose 50% Injectable 12.5 once  dextrose 50% Injectable 25 once  dextrose 50% Injectable 25 once  dextrose Gel 1 once PRN  docusate sodium 100 two times a day  furosemide    Tablet 40 two times a day  glucagon  Injectable 1 once PRN  heparin  Injectable 5000 every 8 hours  insulin lispro (HumaLOG) corrective regimen sliding scale  three times a day before meals  insulin lispro (HumaLOG) corrective regimen sliding scale  at bedtime  labetalol 100 two times a day  NIFEdipine XL 30 at bedtime  polyethylene glycol 3350 17 daily PRN  polyethylene glycol 3350 17 daily  senna 2 at bedtime  tamsulosin 0.4 at bedtime    Vital Signs Last 24 Hrs  T(C): 36.6 (11 May 2018 17:50), Max: 36.9 (10 May 2018 20:39)  T(F): 97.8 (11 May 2018 17:50), Max: 98.5 (11 May 2018 01:26)  HR: 67 (11 May 2018 17:50) (62 - 68)  BP: 173/68 (11 May 2018 17:50) (157/62 - 178/67)  BP(mean): --  RR: 18 (11 May 2018 17:50) (16 - 20)  SpO2: 99% (11 May 2018 17:50) (96% - 99%)    PHYSICAL EXAM:  General: WN/WD NAD, Non-toxic  Neurology: A&Ox3, nonfocal  Respiratory: Clear to auscultation bilaterally  CV: RRR, S1S2, no murmurs, rubs or gallops  Abdominal: Soft, Non-tender, non-distended, normal bowel sounds  Extremities: compressive dressing right calf and dressing foot clean and dry  Line Sites: Clear  Skin: No rash                        8.0    5.88  )-----------( 344      ( 11 May 2018 05:45 )             25.6       05-11    136  |  101  |  51<H>  ----------------------------<  72  5.1   |  20<L>  |  3.00<H>    Ca    8.9      11 May 2018 05:45  Phos  4.2     05-11  Mg     2.2     05-11      MICROBIOLOGY:  BLOOD PERIPHERAL  05-05-18 --  --  --      OTHER  05-03-18 --  --  Acinetobacter baumannii  Proteus mirabilis  Staphylococcus aureus    RADIOLOGY:  < from: Xray Abdomen 1 View PORTABLE -Urgent (05.09.18 @ 15:08) >  Nonobstructive bowel gas pattern. Left pleural effusion.    < end of copied text >      Derick Skelton MD; Division of Infectious Disease; Pager: [phone]; nights and weekends: [phone] yes...

## 2021-11-08 ENCOUNTER — APPOINTMENT (OUTPATIENT)
Dept: ELECTROPHYSIOLOGY | Facility: CLINIC | Age: 66
End: 2021-11-08
Payer: MEDICAID

## 2021-11-08 ENCOUNTER — NON-APPOINTMENT (OUTPATIENT)
Age: 66
End: 2021-11-08

## 2021-11-08 PROCEDURE — 93298 REM INTERROG DEV EVAL SCRMS: CPT | Mod: NC

## 2021-11-08 PROCEDURE — G2066: CPT | Mod: NC

## 2021-12-13 ENCOUNTER — NON-APPOINTMENT (OUTPATIENT)
Age: 66
End: 2021-12-13

## 2021-12-13 ENCOUNTER — APPOINTMENT (OUTPATIENT)
Dept: ELECTROPHYSIOLOGY | Facility: CLINIC | Age: 66
End: 2021-12-13
Payer: MEDICAID

## 2021-12-13 PROCEDURE — 93298 REM INTERROG DEV EVAL SCRMS: CPT

## 2021-12-13 PROCEDURE — G2066: CPT | Mod: NC

## 2022-01-13 ENCOUNTER — NON-APPOINTMENT (OUTPATIENT)
Age: 67
End: 2022-01-13

## 2022-01-13 ENCOUNTER — APPOINTMENT (OUTPATIENT)
Dept: ELECTROPHYSIOLOGY | Facility: CLINIC | Age: 67
End: 2022-01-13
Payer: MEDICAID

## 2022-01-13 PROCEDURE — G2066: CPT | Mod: NC

## 2022-01-13 PROCEDURE — 93298 REM INTERROG DEV EVAL SCRMS: CPT

## 2022-02-10 NOTE — ED PROVIDER NOTE - RATE
Subjective:       Patient ID: Yadi Mccall is a 63 y.o. female.    Chief Complaint: COPD and Hypertension (Needing rx-s)    HPI   Needs flu shot  Needs med renewals  Review of Systems   Constitutional: Positive for fatigue. Negative for activity change, appetite change, chills, diaphoresis, fever and unexpected weight change.   Eyes: Negative.    Respiratory: Positive for cough, shortness of breath and wheezing.    Cardiovascular: Negative.  Negative for chest pain and leg swelling.   Gastrointestinal: Negative.    Endocrine: Negative.    Genitourinary: Negative.    Musculoskeletal: Positive for back pain.   Integumentary:  Negative for rash. Negative.   Neurological: Negative.          Objective:      Physical Exam  Vitals reviewed.   Constitutional:       General: She is not in acute distress.     Appearance: Normal appearance. She is obese. She is not ill-appearing, toxic-appearing or diaphoretic.   HENT:      Head: Normocephalic and atraumatic.      Right Ear: Tympanic membrane, ear canal and external ear normal. There is no impacted cerumen.      Left Ear: Tympanic membrane, ear canal and external ear normal. There is no impacted cerumen.      Nose: Nose normal.      Mouth/Throat:      Mouth: Mucous membranes are moist.      Pharynx: Oropharynx is clear. No oropharyngeal exudate or posterior oropharyngeal erythema.   Eyes:      General: No scleral icterus.     Conjunctiva/sclera: Conjunctivae normal.   Neck:      Vascular: No carotid bruit.   Cardiovascular:      Rate and Rhythm: Normal rate and regular rhythm.      Pulses: Normal pulses.      Heart sounds: Normal heart sounds. No murmur heard.  No friction rub. No gallop.    Pulmonary:      Effort: Pulmonary effort is normal. No respiratory distress.      Breath sounds: Normal breath sounds. No stridor. No wheezing, rhonchi or rales.   Abdominal:      General: Abdomen is flat. Bowel sounds are normal. There is no distension.      Palpations: Abdomen is soft.  There is no mass.      Tenderness: There is no abdominal tenderness. There is no guarding or rebound.      Hernia: No hernia is present.   Musculoskeletal:         General: No swelling.      Cervical back: Normal range of motion and neck supple. No rigidity or tenderness.      Right lower leg: No edema.      Left lower leg: No edema.   Lymphadenopathy:      Cervical: No cervical adenopathy.   Skin:     General: Skin is warm and dry.      Findings: No rash.   Neurological:      General: No focal deficit present.      Mental Status: She is alert and oriented to person, place, and time.         Assessment:       Problem List Items Addressed This Visit     History of CVA with residual deficit (Chronic)    Relevant Orders    Comprehensive Metabolic Panel    Lipid Panel    TSH    CBC Auto Differential    HIV 1/2 Ag/Ab (4th Gen)    Nicotine dependence    Relevant Orders    Comprehensive Metabolic Panel    Lipid Panel    TSH    CBC Auto Differential    HIV 1/2 Ag/Ab (4th Gen)    Hyperlipidemia    Relevant Orders    Comprehensive Metabolic Panel    Lipid Panel    TSH    CBC Auto Differential    HIV 1/2 Ag/Ab (4th Gen)    Essential hypertension - Primary    Relevant Medications    lisinopriL 10 MG tablet    Other Relevant Orders    Comprehensive Metabolic Panel    Lipid Panel    TSH    CBC Auto Differential    HIV 1/2 Ag/Ab (4th Gen)    Atrial flutter, paroxysmal - post op event    Relevant Orders    Comprehensive Metabolic Panel    Lipid Panel    TSH    CBC Auto Differential    HIV 1/2 Ag/Ab (4th Gen)    CAD (coronary artery disease)    Relevant Orders    Comprehensive Metabolic Panel    Lipid Panel    TSH    CBC Auto Differential    HIV 1/2 Ag/Ab (4th Gen)    Anemia    Relevant Orders    Comprehensive Metabolic Panel    Lipid Panel    TSH    CBC Auto Differential    HIV 1/2 Ag/Ab (4th Gen)    Generalized anxiety disorder    Relevant Orders    Comprehensive Metabolic Panel    Lipid Panel    TSH    CBC Auto Differential     HIV 1/2 Ag/Ab (4th Gen)    COPD (chronic obstructive pulmonary disease)    Relevant Orders    Comprehensive Metabolic Panel    Lipid Panel    TSH    CBC Auto Differential    HIV 1/2 Ag/Ab (4th Gen)      Other Visit Diagnoses     COPD exacerbation        Relevant Medications    albuterol (PROVENTIL/VENTOLIN HFA) 90 mcg/actuation inhaler    fluticasone-salmeterol diskus inhaler 250-50 mcg    Other Relevant Orders    Comprehensive Metabolic Panel    Lipid Panel    TSH    CBC Auto Differential    HIV 1/2 Ag/Ab (4th Gen)    Restless legs        Relevant Medications    baclofen (LIORESAL) 20 MG tablet    rOPINIRole (REQUIP) 2 MG tablet    Other Relevant Orders    Comprehensive Metabolic Panel    Lipid Panel    TSH    CBC Auto Differential    HIV 1/2 Ag/Ab (4th Gen)    Periodic health assessment, general screening, adult        Relevant Orders    Comprehensive Metabolic Panel    Lipid Panel    TSH    CBC Auto Differential    HIV 1/2 Ag/Ab (4th Gen)    Immunization deficiency              Plan:       Yadi was seen today for copd and hypertension.    Diagnoses and all orders for this visit:    Essential hypertension  -     lisinopriL 10 MG tablet; Take 1 tablet (10 mg total) by mouth once daily.  -     Comprehensive Metabolic Panel; Future  -     Lipid Panel; Future  -     TSH; Future  -     CBC Auto Differential; Future  -     HIV 1/2 Ag/Ab (4th Gen); Future    COPD exacerbation  -     albuterol (PROVENTIL/VENTOLIN HFA) 90 mcg/actuation inhaler; Inhale 2 puffs into the lungs every 6 (six) hours.  -     fluticasone-salmeterol diskus inhaler 250-50 mcg; Inhale 1 puff into the lungs 2 (two) times daily. Controller  -     Comprehensive Metabolic Panel; Future  -     Lipid Panel; Future  -     TSH; Future  -     CBC Auto Differential; Future  -     HIV 1/2 Ag/Ab (4th Gen); Future    Restless legs  -     baclofen (LIORESAL) 20 MG tablet; Take 1 tablet (20 mg total) by mouth 2 (two) times daily.  -     rOPINIRole (REQUIP) 2 MG  tablet; TAKE 1 TABLET THREE TIMES DAILY AS NEEDED  FOR  RESTLESS  LEGS  -     Comprehensive Metabolic Panel; Future  -     Lipid Panel; Future  -     TSH; Future  -     CBC Auto Differential; Future  -     HIV 1/2 Ag/Ab (4th Gen); Future    Mixed hyperlipidemia  -     Comprehensive Metabolic Panel; Future  -     Lipid Panel; Future  -     TSH; Future  -     CBC Auto Differential; Future  -     HIV 1/2 Ag/Ab (4th Gen); Future    Anemia, unspecified type  -     Comprehensive Metabolic Panel; Future  -     Lipid Panel; Future  -     TSH; Future  -     CBC Auto Differential; Future  -     HIV 1/2 Ag/Ab (4th Gen); Future    Cigarette nicotine dependence without complication  -     Comprehensive Metabolic Panel; Future  -     Lipid Panel; Future  -     TSH; Future  -     CBC Auto Differential; Future  -     HIV 1/2 Ag/Ab (4th Gen); Future    Atrial flutter, paroxysmal - post op event  -     Comprehensive Metabolic Panel; Future  -     Lipid Panel; Future  -     TSH; Future  -     CBC Auto Differential; Future  -     HIV 1/2 Ag/Ab (4th Gen); Future    Coronary artery disease involving native coronary artery of native heart without angina pectoris  -     Comprehensive Metabolic Panel; Future  -     Lipid Panel; Future  -     TSH; Future  -     CBC Auto Differential; Future  -     HIV 1/2 Ag/Ab (4th Gen); Future    History of CVA with residual deficit  -     Comprehensive Metabolic Panel; Future  -     Lipid Panel; Future  -     TSH; Future  -     CBC Auto Differential; Future  -     HIV 1/2 Ag/Ab (4th Gen); Future    Generalized anxiety disorder  -     Comprehensive Metabolic Panel; Future  -     Lipid Panel; Future  -     TSH; Future  -     CBC Auto Differential; Future  -     HIV 1/2 Ag/Ab (4th Gen); Future    Simple chronic bronchitis  -     Comprehensive Metabolic Panel; Future  -     Lipid Panel; Future  -     TSH; Future  -     CBC Auto Differential; Future  -     HIV 1/2 Ag/Ab (4th Gen); Future    Periodic health  assessment, general screening, adult  -     Comprehensive Metabolic Panel; Future  -     Lipid Panel; Future  -     TSH; Future  -     CBC Auto Differential; Future  -     HIV 1/2 Ag/Ab (4th Gen); Future    Immunization deficiency  -     Cancel: Influenza (FLUAD) - Quadrivalent (Adjuvanted) *Preferred* (65+) (PF)    Other orders  -     Influenza - Quadrivalent (PF)  The following orders have not been finalized:  -     Cancel: lisinopriL (PRINIVIL,ZESTRIL) 40 MG tablet    discussed otc's  Discussed diet and exercise  Salt reduction  Discussed smoking cessation   Discussed new PCP and f/u      60

## 2022-02-16 ENCOUNTER — NON-APPOINTMENT (OUTPATIENT)
Age: 67
End: 2022-02-16

## 2022-02-16 ENCOUNTER — APPOINTMENT (OUTPATIENT)
Dept: ELECTROPHYSIOLOGY | Facility: CLINIC | Age: 67
End: 2022-02-16
Payer: MEDICAID

## 2022-02-16 PROCEDURE — 93298 REM INTERROG DEV EVAL SCRMS: CPT

## 2022-02-16 PROCEDURE — G2066: CPT | Mod: NC

## 2022-02-22 NOTE — ED PROVIDER NOTE - NS ED MD DISPO DISCHARGE CCDA
The child was positive for covid on [id number].   Patient/Caregiver provided printed discharge information.

## 2022-03-10 ENCOUNTER — APPOINTMENT (OUTPATIENT)
Dept: ELECTROPHYSIOLOGY | Facility: CLINIC | Age: 67
End: 2022-03-10

## 2022-03-23 ENCOUNTER — NON-APPOINTMENT (OUTPATIENT)
Age: 67
End: 2022-03-23

## 2022-03-23 ENCOUNTER — APPOINTMENT (OUTPATIENT)
Dept: ELECTROPHYSIOLOGY | Facility: CLINIC | Age: 67
End: 2022-03-23
Payer: MEDICAID

## 2022-03-23 PROCEDURE — G2066: CPT | Mod: NC

## 2022-03-23 PROCEDURE — 93298 REM INTERROG DEV EVAL SCRMS: CPT

## 2022-04-14 ENCOUNTER — APPOINTMENT (OUTPATIENT)
Dept: ELECTROPHYSIOLOGY | Facility: CLINIC | Age: 67
End: 2022-04-14
Payer: MEDICAID

## 2022-04-14 PROCEDURE — 93285 PRGRMG DEV EVAL SCRMS IP: CPT

## 2022-04-14 RX ORDER — CROMOLYN SODIUM 40 MG/ML
4 SOLUTION/ DROPS OPHTHALMIC
Refills: 0 | Status: ACTIVE | COMMUNITY

## 2022-04-14 RX ORDER — ATORVASTATIN CALCIUM 40 MG/1
40 TABLET, FILM COATED ORAL
Refills: 0 | Status: ACTIVE | COMMUNITY

## 2022-04-14 RX ORDER — SIMETHICONE CHEW TAB 80 MG 80 MG
80 TABLET ORAL
Refills: 0 | Status: ACTIVE | COMMUNITY

## 2022-04-14 RX ORDER — PANTOPRAZOLE 40 MG/1
40 TABLET, DELAYED RELEASE ORAL
Refills: 0 | Status: DISCONTINUED | COMMUNITY
End: 2022-04-14

## 2022-04-14 RX ORDER — LATANOPROST/PF 0.005 %
0.01 DROPS OPHTHALMIC (EYE) DAILY
Refills: 0 | Status: ACTIVE | COMMUNITY

## 2022-04-14 NOTE — PROGRESS NOTE ADULT - PROBLEM SELECTOR PLAN 2
Diflucan sent Acidosis in the setting of JANY and infection. Serum CO2 stable at 23 today. Pt. on oral sodium bicarbonate therapy. Monitor serum CO2

## 2022-05-17 ENCOUNTER — NON-APPOINTMENT (OUTPATIENT)
Age: 67
End: 2022-05-17

## 2022-05-17 ENCOUNTER — APPOINTMENT (OUTPATIENT)
Dept: ELECTROPHYSIOLOGY | Facility: CLINIC | Age: 67
End: 2022-05-17
Payer: MEDICAID

## 2022-05-17 PROCEDURE — G2066: CPT | Mod: NC

## 2022-05-17 PROCEDURE — 93298 REM INTERROG DEV EVAL SCRMS: CPT

## 2022-06-09 NOTE — ED ADULT NURSE NOTE - BREATHING, MLM
What Type Of Note Output Would You Prefer (Optional)?: Standard Output
How Severe Is Your Rash?: moderate
Is This A New Presentation, Or A Follow-Up?: Rash
Spontaneous, unlabored and symmetrical

## 2022-06-20 NOTE — PHYSICAL THERAPY INITIAL EVALUATION ADULT - PHYSICAL ASSIST/NONPHYSICAL ASSIST, REHAB EVAL
verbal cues/nonverbal cues (demo/gestures)/1 person assist
Alert and oriented to person, place, time/situation. normal mood and affect. no apparent risk to self or others.

## 2022-06-21 ENCOUNTER — NON-APPOINTMENT (OUTPATIENT)
Age: 67
End: 2022-06-21

## 2022-06-21 ENCOUNTER — APPOINTMENT (OUTPATIENT)
Dept: ELECTROPHYSIOLOGY | Facility: CLINIC | Age: 67
End: 2022-06-21
Payer: MEDICAID

## 2022-06-21 PROCEDURE — 93298 REM INTERROG DEV EVAL SCRMS: CPT

## 2022-06-21 PROCEDURE — G2066: CPT | Mod: NC

## 2022-07-11 NOTE — PROGRESS NOTE ADULT - ASSESSMENT
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 no lesions,  no deformities,  no traumatic injuries,  no significant scars are present,  chest wall non-tender,  no masses present, breathing is unlabored without accessory muscle use,normal breath sounds

## 2022-07-27 ENCOUNTER — NON-APPOINTMENT (OUTPATIENT)
Age: 67
End: 2022-07-27

## 2022-07-27 ENCOUNTER — APPOINTMENT (OUTPATIENT)
Dept: ELECTROPHYSIOLOGY | Facility: CLINIC | Age: 67
End: 2022-07-27

## 2022-07-27 PROCEDURE — 93298 REM INTERROG DEV EVAL SCRMS: CPT

## 2022-07-27 PROCEDURE — G2066: CPT | Mod: NC

## 2022-08-16 NOTE — PROGRESS NOTE ADULT - PROVIDER SPECIALTY LIST ADULT
Refill sent to pharmacy.      [facility] RX/SHOWED DL/HYDROCODONE -ACETAMINOPHEN(NORCO)5-325 MG PER TABLET [id number]   Vascular Surgery

## 2022-08-31 ENCOUNTER — APPOINTMENT (OUTPATIENT)
Dept: ELECTROPHYSIOLOGY | Facility: CLINIC | Age: 67
End: 2022-08-31

## 2022-08-31 ENCOUNTER — NON-APPOINTMENT (OUTPATIENT)
Age: 67
End: 2022-08-31

## 2022-08-31 PROCEDURE — 93298 REM INTERROG DEV EVAL SCRMS: CPT

## 2022-08-31 PROCEDURE — G2066: CPT | Mod: NC

## 2022-10-05 ENCOUNTER — NON-APPOINTMENT (OUTPATIENT)
Age: 67
End: 2022-10-05

## 2022-10-05 ENCOUNTER — APPOINTMENT (OUTPATIENT)
Dept: ELECTROPHYSIOLOGY | Facility: CLINIC | Age: 67
End: 2022-10-05

## 2022-10-05 PROCEDURE — 93298 REM INTERROG DEV EVAL SCRMS: CPT

## 2022-10-05 PROCEDURE — G2066: CPT | Mod: NC

## 2022-11-09 ENCOUNTER — APPOINTMENT (OUTPATIENT)
Dept: ELECTROPHYSIOLOGY | Facility: CLINIC | Age: 67
End: 2022-11-09

## 2022-11-09 ENCOUNTER — NON-APPOINTMENT (OUTPATIENT)
Age: 67
End: 2022-11-09

## 2022-11-09 PROCEDURE — 93298 REM INTERROG DEV EVAL SCRMS: CPT

## 2022-11-09 PROCEDURE — G2066: CPT | Mod: NC

## 2022-12-14 ENCOUNTER — NON-APPOINTMENT (OUTPATIENT)
Age: 67
End: 2022-12-14

## 2022-12-14 ENCOUNTER — APPOINTMENT (OUTPATIENT)
Dept: ELECTROPHYSIOLOGY | Facility: CLINIC | Age: 67
End: 2022-12-14

## 2022-12-14 PROCEDURE — G2066: CPT | Mod: NC

## 2022-12-14 PROCEDURE — 93298 REM INTERROG DEV EVAL SCRMS: CPT

## 2022-12-15 NOTE — DISCHARGE NOTE ADULT - IF YOU ARE A SMOKER, IT IS IMPORTANT FOR YOUR HEALTH TO STOP SMOKING. PLEASE BE AWARE THAT SECOND HAND SMOKE IS ALSO HARMFUL.
Subjective     Autumn Juárez is a 60 y.o. female who presents annual           HPI      Here for annual exam   Medications, allergies, medical history, surgical history, social history, family history  reviewed and updated  On Toprol and losartan blood pressures have stable, keeping active exercising, does power lifting weight training, eats a healthy diet, avoid sweets, candies, processed foods.  No tobacco, no alcohol  Eye exam annually  Teeth cleaning every six months   No joint pains with exercise or weight training.  No chest pain or shortness of breath with activity.  Medications, allergies, medical history, surgical history, social history, family history  reviewed and updated        Current Outpatient Medications   Medication Sig Dispense Refill    metoprolol SR (TOPROL XL) 25 MG TABLET SR 24 HR TAKE 1 TABLET BY MOUTH EVERY DAY 90 Tablet 3    atorvastatin (LIPITOR) 20 MG Tab Take 1 Tablet by mouth every evening. 90 Tablet 3    losartan (COZAAR) 25 MG Tab TAKE 1 TABLET BY MOUTH EVERY DAY 90 Tablet 3    estradiol (ESTRACE) 0.1 MG/GM vaginal cream Insert 1 g into the vagina every day. Daily X 7 days then 2-3 X weekly  Indications: Vulvovaginal Atrophy 42.5 g 3    CREATINE PO Take  by mouth.      Multiple Vitamins-Minerals (MULTIVITAMIN PO) Take  by mouth.      Cholecalciferol (VITAMIN D PO) Take  by mouth.      Cetirizine HCl (ZYRTEC ALLERGY PO) Take  by mouth.      FISH OIL by Does not apply route.      Calcium Carbonate (CALCIUM 500 PO) Take  by mouth.       No current facility-administered medications for this visit.       abn alk phos  2/23/21 alk phos 134, bili 0.4, AST 33, ALT 26   6/11/21 alk phos 111 (), liver fraction 57 (0-94), bone fraction 54 (0-55), AMA negative   6/8/22 alk phos 89    dry eye  9/26/16  note  10/25/16 ophthalmology note     Dyslipidemia  8/10 chol 195,trig 63,hdl 62,ldl 120  8/11 chol 199,trig 63,hdl 54,ldl 132  5/13 chol 184,trig 56,hdl 58,ldl 115  4/18/14 chol  194,trig 71,hdl 60,ldl 120  5/5/14 chol 207,trig 124,hdl 57,ldl 125,LDL-P 1380,HDL-P 34,LP-IR <25; 10 year risk 2.1%  8/31/15 chol 201,trig 154,hdl 49,ldl 121  9/17/16 chol 197,trig 76,hdl 68,ldl 114  9/5/17 chol 216,trig 80,hdl 59,ldl 141  10/11/19 chol 225,trig 94,hdl 58,ldl 148; 10 year risk 5.9%   2/23/21 chol 211,trig 86,hdl 66,ldl 130; 10 year risk 4.75%  6/8/22 chol 279,trig 99,hdl 67,ldl 192; 10 year risk 8.5%  6/13/22 start lipitor 20 mg   7/13/22 chol 159,trig 78,hdl 56,ldl 87 on lipitor 20 mg     history of tonsiillar hypertrophy  12/10 ENT  note nasopharyngoscopy tonsillar hypertrophy, possible lingual tonsillitis, could not rule out underlying mass, will do course of abx and repeat nasopharyngoscopy an MRI tongue base to be done afterwards and  1/11 dr.van garrison note ENT repeat nasopharyngoscopy showed improved tonsillar hypertrophy and tonsillitis after antibiotics      Hypertension  4/28/14 on toprol and start asa  5/2/14 urine mac <0.5 on toprol 25 mg  9/17/16 urine mac <0.7 on toprol 25 mg   1/12/18 urine mac <0.7 on toprol 25 mg  10/8/19 echo normal LV function, EF 65%, no evidence of LVH continue to monitor home blood pressures on toprol notify us if systolic above 140 consistently  10/11/19 renin 0.5 and aldosterone 11.0, plasma metanephrine and normetanephrine normal on toprol 25 mg  2/23/21 urine mac<3.0 on toprol 25 mg  4/14/22 add losartan 25 mg to toprol 25 mg  6/8/22 urine mac<1.2 on losartan 25 mg and toprol 25 mg  6/28/22 echo EF 60 to 65%, normal diastolic function, RVSP 25    osteopenia  2/17/20 dexa LS-1.9,hip-2.1  2/23/21 vit d 96 on 5000 daily, change to qod  5/17/22 dexa LS-1.5,hip-2.1   6/8/22 vit d 81     Preventative health  6/6/14 colon by [facility] diverticulosis  10/7/19 cologuard negative  10/11/19 hep c ab negative  12/5/19 shingrix second   2/16/21 sonocine negative  2/23/21 A1c 5.5%  4/15/21 covid pfizer second   10/19/21 [facility] note pap smear negative,  repeat one more time in 5 years  10/26/21 mammogram heterogeneously dense breast tissue recommend screening breast ultrasound  5/17/22 dexa LS-1.5,hip-2.1   6/8/22 vit d 81  12/15/22 flu  12/15/22 tdap     vaginal atrophy  11/17/20 topical HRT per gynecology  12/15/22 topical estradiol twice per week              Patient Active Problem List   Diagnosis    Hypertension    Preventative health care    Dyslipidemia    History of tonsillar hypertrophy    Social anxiety disorder    Dry eye syndrome    Vaginal atrophy    Osteopenia    Abnormal alkaline phosphatase test                Patient Care Team:  Master Dash M.D. as PCP - General    ROS           Objective     LMP 06/01/2011      Physical Exam  Vitals and nursing note reviewed.   Constitutional:       Appearance: Normal appearance.   HENT:      Head: Normocephalic and atraumatic.      Right Ear: External ear normal.      Left Ear: External ear normal.   Eyes:      Conjunctiva/sclera: Conjunctivae normal.   Cardiovascular:      Rate and Rhythm: Normal rate and regular rhythm.      Heart sounds: Normal heart sounds.   Pulmonary:      Effort: Pulmonary effort is normal.      Breath sounds: Normal breath sounds.   Abdominal:      General: There is no distension.   Skin:     General: Skin is warm.   Neurological:      General: No focal deficit present.      Mental Status: She is alert.   Psychiatric:         Mood and Affect: Mood normal.         Behavior: Behavior normal.                           Assessment & Plan     Assessment  #! Annual exam       #2  Dyslipidemia 7/13/22 chol 159,trig 78,hdl 56,ldl 87 on lipitor 20 mg    #3 hypertension stable on Toprol and losartan    #4 postmenopausal vaginal atrophy on topical estradiol twice per week    #5 prev health  6/6/14 colon by [facility] diverticulosis  10/7/19 cologuard negative  10/11/19 hep c ab negative  12/5/19 shingrix second   2/16/21 sonocine negative  2/23/21 A1c 5.5%  4/15/21 covid pfizer second   10/19/21 [facility] note pap smear negative, repeat one more time in 5 years  10/26/21 mammogram heterogeneously dense breast tissue recommend screening breast ultrasound  5/17/22 dexa LS-1.5,hip-2.1   6/8/22 vit d 81  12/15/22 flu  12/15/22 tdap    Plan  #1 health maintenance reviewed and updated    #2 influenza vaccine today    #3 tdap    #4 refill topical estradiol vaginal twice per week    #5 continue check blood pressures regularly, continue Toprol, losartan, continue good nutrition and exercise program    #6 continue Lipitor, labs ordered    #7 mammogram    #8 Cologuard    #9 she can get the updated COVID-vaccine at the pharmacy    #10 follow-up 1 year     Statement Selected

## 2023-01-20 ENCOUNTER — NON-APPOINTMENT (OUTPATIENT)
Age: 68
End: 2023-01-20

## 2023-01-20 ENCOUNTER — APPOINTMENT (OUTPATIENT)
Dept: ELECTROPHYSIOLOGY | Facility: CLINIC | Age: 68
End: 2023-01-20
Payer: MEDICAID

## 2023-01-20 PROCEDURE — 93298 REM INTERROG DEV EVAL SCRMS: CPT

## 2023-01-20 PROCEDURE — G2066: CPT | Mod: NC

## 2023-02-24 ENCOUNTER — APPOINTMENT (OUTPATIENT)
Dept: ELECTROPHYSIOLOGY | Facility: CLINIC | Age: 68
End: 2023-02-24
Payer: MEDICAID

## 2023-02-24 ENCOUNTER — NON-APPOINTMENT (OUTPATIENT)
Age: 68
End: 2023-02-24

## 2023-02-24 PROCEDURE — G2066: CPT | Mod: NC

## 2023-02-24 PROCEDURE — 93298 REM INTERROG DEV EVAL SCRMS: CPT

## 2023-03-24 NOTE — PHYSICAL THERAPY INITIAL EVALUATION ADULT - PHYSICAL ASSIST/NONPHYSICAL ASSIST: SIT/SUPINE, REHAB EVAL
Patient denies any new symptoms   States cough is exactly the same as before
Pt called stating he was seen last week for a cough and prescribed a z-erwin,  Pt completed the z-erwin on Monday and yesterday started back up with the cough  Denies fever, sore throat, chest congestion, nasal congestion, ear pain, n/v/d and headache 
verbal cues/nonverbal cues (demo/gestures)/1 person assist
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
## 2023-03-30 ENCOUNTER — APPOINTMENT (OUTPATIENT)
Dept: ELECTROPHYSIOLOGY | Facility: CLINIC | Age: 68
End: 2023-03-30

## 2023-03-30 ENCOUNTER — NON-APPOINTMENT (OUTPATIENT)
Age: 68
End: 2023-03-30

## 2023-04-13 ENCOUNTER — APPOINTMENT (OUTPATIENT)
Dept: ELECTROPHYSIOLOGY | Facility: CLINIC | Age: 68
End: 2023-04-13

## 2023-04-24 NOTE — H&P ADULT - PROBLEM/PLAN-4
Pt remains on room air. Pt voiding and stooling. Pt had one emesis with NG feeds. PICC line infusing as ordered. Small mass palpated on right arm near shoulder, MD notified, see flowsheet for details. Parents have not been heard from so far this shift. See flowsheet for details. DISPLAY PLAN FREE TEXT

## 2023-05-01 NOTE — PROGRESS NOTE ADULT - ASSESSMENT
63M LEFT toes, forefoot, midfoot blister with cellulitis (improving with local care).    Plan:  - Pt seen and evaluated  - Appearance of foot continues to improve  - Cont IV abx  - Silvadene to LEFT foot wound daily  - No pod sx intervention at this time  - [facility] planning bypass for Monday.  - Will cont to follow. PACU

## 2023-05-04 ENCOUNTER — NON-APPOINTMENT (OUTPATIENT)
Age: 68
End: 2023-05-04

## 2023-05-04 ENCOUNTER — APPOINTMENT (OUTPATIENT)
Dept: ELECTROPHYSIOLOGY | Facility: CLINIC | Age: 68
End: 2023-05-04
Payer: MEDICAID

## 2023-05-04 PROCEDURE — 93298 REM INTERROG DEV EVAL SCRMS: CPT

## 2023-05-04 PROCEDURE — G2066: CPT | Mod: NC

## 2023-05-12 NOTE — PROGRESS NOTE ADULT - PROBLEM SELECTOR PROBLEM 7
Pharmacist Renal Dose Adjustment Note    Rose Leija is a 75 y.o. female being treated with the medication Lovenox    Patient Data:    Vital Signs (Most Recent):  Temp: 98.3 °F (36.8 °C) (05/12/23 1150)  Pulse: 83 (05/12/23 1150)  Resp: 18 (05/12/23 1150)  BP: 131/60 (05/12/23 1150)  SpO2: 95 % (05/12/23 1150) Vital Signs (72h Range):  Temp:  [97.3 °F (36.3 °C)-98.3 °F (36.8 °C)]   Pulse:  [83-95]   Resp:  [18]   BP: ()/(40-60)   SpO2:  [95 %]      Recent Labs   Lab 05/10/23  1127 05/11/23  0855 05/12/23  1330   CREATININE 1.3 1.4 1.0     Serum creatinine: 1 mg/dL 05/12/23 1330  Estimated creatinine clearance: 43.7 mL/min    Medication:Lovenox 40 mg BID was decreased to 40 mg daily per protocol / BMI < 40 / renal fx > 30.    Pharmacist's Name: Joselito Cottrell  Pharmacist's Extension: [phone]       Prophylactic measure

## 2023-05-23 ENCOUNTER — APPOINTMENT (OUTPATIENT)
Dept: ELECTROPHYSIOLOGY | Facility: CLINIC | Age: 68
End: 2023-05-23
Payer: MEDICAID

## 2023-05-23 VITALS — DIASTOLIC BLOOD PRESSURE: 64 MMHG | RESPIRATION RATE: 14 BRPM | HEART RATE: 66 BPM | SYSTOLIC BLOOD PRESSURE: 131 MMHG

## 2023-05-23 DIAGNOSIS — Z89.511 ACQUIRED ABSENCE OF RIGHT LEG BELOW KNEE: ICD-10-CM

## 2023-05-23 DIAGNOSIS — Z86.73 PERSONAL HISTORY OF TRANSIENT ISCHEMIC ATTACK (TIA), AND CEREBRAL INFARCTION W/OUT RESIDUAL DEFICITS: ICD-10-CM

## 2023-05-23 DIAGNOSIS — I69.354 HEMIPLEGIA AND HEMIPARESIS FOLLOWING CEREBRAL INFARCTION AFFECTING LEFT NON-DOMINANT SIDE: ICD-10-CM

## 2023-05-23 DIAGNOSIS — Z89.612 ACQUIRED ABSENCE OF LEFT LEG ABOVE KNEE: ICD-10-CM

## 2023-05-23 DIAGNOSIS — Z99.2 DEPENDENCE ON RENAL DIALYSIS: ICD-10-CM

## 2023-05-23 PROCEDURE — 93285 PRGRMG DEV EVAL SCRMS IP: CPT

## 2023-05-23 RX ORDER — CITALOPRAM 10 MG/1
10 TABLET, FILM COATED ORAL
Refills: 0 | Status: ACTIVE | COMMUNITY

## 2023-05-23 RX ORDER — LABETALOL HYDROCHLORIDE 200 MG/1
200 TABLET, FILM COATED ORAL
Refills: 0 | Status: ACTIVE | COMMUNITY

## 2023-05-23 RX ORDER — NIFEDIPINE 60 MG/1
60 TABLET, EXTENDED RELEASE ORAL
Refills: 0 | Status: ACTIVE | COMMUNITY

## 2023-05-23 RX ORDER — APIXABAN 2.5 MG/1
2.5 TABLET, FILM COATED ORAL
Refills: 0 | Status: DISCONTINUED | COMMUNITY
End: 2023-05-23

## 2023-05-23 RX ORDER — OMEPRAZOLE 20 MG/1
20 TABLET, DELAYED RELEASE ORAL
Refills: 0 | Status: DISCONTINUED | COMMUNITY
End: 2023-05-23

## 2023-05-23 RX ORDER — ATORVASTATIN CALCIUM 80 MG/1
80 TABLET, FILM COATED ORAL
Refills: 0 | Status: DISCONTINUED | COMMUNITY
End: 2023-05-23

## 2023-05-23 RX ORDER — PANTOPRAZOLE SODIUM 40 MG/1
40 TABLET, DELAYED RELEASE ORAL
Refills: 0 | Status: ACTIVE | COMMUNITY

## 2023-06-21 ENCOUNTER — INPATIENT (INPATIENT)
Facility: HOSPITAL | Age: 68
LOS: 6 days | Discharge: NURSING, REHAB OR LONG-TERM CARE | End: 2023-06-28
Attending: INTERNAL MEDICINE | Admitting: INTERNAL MEDICINE
Payer: MEDICAID

## 2023-06-21 VITALS
SYSTOLIC BLOOD PRESSURE: 135 MMHG | DIASTOLIC BLOOD PRESSURE: 68 MMHG | TEMPERATURE: 99 F | OXYGEN SATURATION: 100 % | WEIGHT: 100.09 LBS | HEART RATE: 60 BPM | RESPIRATION RATE: 18 BRPM

## 2023-06-21 DIAGNOSIS — Z89.612 ACQUIRED ABSENCE OF LEFT LEG ABOVE KNEE: Chronic | ICD-10-CM

## 2023-06-21 DIAGNOSIS — I10 ESSENTIAL (PRIMARY) HYPERTENSION: ICD-10-CM

## 2023-06-21 DIAGNOSIS — Z98.890 OTHER SPECIFIED POSTPROCEDURAL STATES: Chronic | ICD-10-CM

## 2023-06-21 DIAGNOSIS — Z89.511 ACQUIRED ABSENCE OF RIGHT LEG BELOW KNEE: Chronic | ICD-10-CM

## 2023-06-21 DIAGNOSIS — E78.5 HYPERLIPIDEMIA, UNSPECIFIED: ICD-10-CM

## 2023-06-21 DIAGNOSIS — N18.6 END STAGE RENAL DISEASE: ICD-10-CM

## 2023-06-21 DIAGNOSIS — I25.10 ATHEROSCLEROTIC HEART DISEASE OF NATIVE CORONARY ARTERY WITHOUT ANGINA PECTORIS: ICD-10-CM

## 2023-06-21 DIAGNOSIS — R77.8 OTHER SPECIFIED ABNORMALITIES OF PLASMA PROTEINS: ICD-10-CM

## 2023-06-21 DIAGNOSIS — Z29.9 ENCOUNTER FOR PROPHYLACTIC MEASURES, UNSPECIFIED: ICD-10-CM

## 2023-06-21 DIAGNOSIS — G93.41 METABOLIC ENCEPHALOPATHY: ICD-10-CM

## 2023-06-21 DIAGNOSIS — Z89.611 ACQUIRED ABSENCE OF RIGHT LEG ABOVE KNEE: Chronic | ICD-10-CM

## 2023-06-21 LAB
ALBUMIN SERPL ELPH-MCNC: 2.7 G/DL — LOW (ref 3.3–5)
ALP SERPL-CCNC: 90 U/L — SIGNIFICANT CHANGE UP (ref 40–120)
ALT FLD-CCNC: 33 U/L — SIGNIFICANT CHANGE UP (ref 12–78)
ANION GAP SERPL CALC-SCNC: 3 MMOL/L — LOW (ref 5–17)
APPEARANCE UR: ABNORMAL
APTT BLD: 29.4 SEC — SIGNIFICANT CHANGE UP (ref 27.5–35.5)
AST SERPL-CCNC: 25 U/L — SIGNIFICANT CHANGE UP (ref 15–37)
BACTERIA # UR AUTO: ABNORMAL
BASOPHILS # BLD AUTO: 0.08 K/UL — SIGNIFICANT CHANGE UP (ref 0–0.2)
BASOPHILS NFR BLD AUTO: 1.2 % — SIGNIFICANT CHANGE UP (ref 0–2)
BILIRUB SERPL-MCNC: 0.3 MG/DL — SIGNIFICANT CHANGE UP (ref 0.2–1.2)
BILIRUB UR-MCNC: ABNORMAL
BUN SERPL-MCNC: 45 MG/DL — HIGH (ref 7–23)
CALCIUM SERPL-MCNC: 8.6 MG/DL — SIGNIFICANT CHANGE UP (ref 8.5–10.1)
CHLORIDE SERPL-SCNC: 98 MMOL/L — SIGNIFICANT CHANGE UP (ref 96–108)
CO2 SERPL-SCNC: 35 MMOL/L — HIGH (ref 22–31)
COLOR SPEC: ABNORMAL
CREAT SERPL-MCNC: 4.47 MG/DL — HIGH (ref 0.5–1.3)
DIFF PNL FLD: ABNORMAL
EGFR: 14 ML/MIN/1.73M2 — LOW
EOSINOPHIL # BLD AUTO: 0.66 K/UL — HIGH (ref 0–0.5)
EOSINOPHIL NFR BLD AUTO: 10.2 % — HIGH (ref 0–6)
EPI CELLS # UR: SIGNIFICANT CHANGE UP
GLUCOSE SERPL-MCNC: 109 MG/DL — HIGH (ref 70–99)
GLUCOSE UR QL: NEGATIVE MG/DL — SIGNIFICANT CHANGE UP
HCT VFR BLD CALC: 28.5 % — LOW (ref 39–50)
HGB BLD-MCNC: 8.7 G/DL — LOW (ref 13–17)
IMM GRANULOCYTES NFR BLD AUTO: 0.5 % — SIGNIFICANT CHANGE UP (ref 0–0.9)
INR BLD: 1.11 RATIO — SIGNIFICANT CHANGE UP (ref 0.88–1.16)
KETONES UR-MCNC: ABNORMAL
LACTATE SERPL-SCNC: 1 MMOL/L — SIGNIFICANT CHANGE UP (ref 0.7–2)
LEUKOCYTE ESTERASE UR-ACNC: ABNORMAL
LYMPHOCYTES # BLD AUTO: 0.69 K/UL — LOW (ref 1–3.3)
LYMPHOCYTES # BLD AUTO: 10.6 % — LOW (ref 13–44)
MCHC RBC-ENTMCNC: 28.8 PG — SIGNIFICANT CHANGE UP (ref 27–34)
MCHC RBC-ENTMCNC: 30.5 G/DL — LOW (ref 32–36)
MCV RBC AUTO: 94.4 FL — SIGNIFICANT CHANGE UP (ref 80–100)
MONOCYTES # BLD AUTO: 0.59 K/UL — SIGNIFICANT CHANGE UP (ref 0–0.9)
MONOCYTES NFR BLD AUTO: 9.1 % — SIGNIFICANT CHANGE UP (ref 2–14)
NEUTROPHILS # BLD AUTO: 4.43 K/UL — SIGNIFICANT CHANGE UP (ref 1.8–7.4)
NEUTROPHILS NFR BLD AUTO: 68.4 % — SIGNIFICANT CHANGE UP (ref 43–77)
NITRITE UR-MCNC: NEGATIVE — SIGNIFICANT CHANGE UP
NRBC # BLD: 0 /100 WBCS — SIGNIFICANT CHANGE UP (ref 0–0)
PH UR: 6 — SIGNIFICANT CHANGE UP (ref 5–8)
PLATELET # BLD AUTO: 173 K/UL — SIGNIFICANT CHANGE UP (ref 150–400)
POTASSIUM SERPL-MCNC: 4.3 MMOL/L — SIGNIFICANT CHANGE UP (ref 3.5–5.3)
POTASSIUM SERPL-SCNC: 4.3 MMOL/L — SIGNIFICANT CHANGE UP (ref 3.5–5.3)
PROT SERPL-MCNC: 7.3 GM/DL — SIGNIFICANT CHANGE UP (ref 6–8.3)
PROT UR-MCNC: 500 MG/DL
PROTHROM AB SERPL-ACNC: 13.3 SEC — SIGNIFICANT CHANGE UP (ref 10.5–13.4)
RBC # BLD: 3.02 M/UL — LOW (ref 4.2–5.8)
RBC # FLD: 17.7 % — HIGH (ref 10.3–14.5)
RBC CASTS # UR COMP ASSIST: >50 /HPF (ref 0–4)
SODIUM SERPL-SCNC: 136 MMOL/L — SIGNIFICANT CHANGE UP (ref 135–145)
SP GR SPEC: 1.01 — SIGNIFICANT CHANGE UP (ref 1.01–1.02)
TROPONIN I, HIGH SENSITIVITY RESULT: 245.2 NG/L — HIGH
TROPONIN I, HIGH SENSITIVITY RESULT: 257.5 NG/L — HIGH
UROBILINOGEN FLD QL: NEGATIVE MG/DL — SIGNIFICANT CHANGE UP
WBC # BLD: 6.48 K/UL — SIGNIFICANT CHANGE UP (ref 3.8–10.5)
WBC # FLD AUTO: 6.48 K/UL — SIGNIFICANT CHANGE UP (ref 3.8–10.5)
WBC UR QL: >50

## 2023-06-21 PROCEDURE — 93010 ELECTROCARDIOGRAM REPORT: CPT

## 2023-06-21 PROCEDURE — 99285 EMERGENCY DEPT VISIT HI MDM: CPT

## 2023-06-21 PROCEDURE — 99222 1ST HOSP IP/OBS MODERATE 55: CPT

## 2023-06-21 PROCEDURE — 70450 CT HEAD/BRAIN W/O DYE: CPT | Mod: 26,MA

## 2023-06-21 PROCEDURE — 71045 X-RAY EXAM CHEST 1 VIEW: CPT | Mod: 26

## 2023-06-21 RX ORDER — ONDANSETRON 8 MG/1
4 TABLET, FILM COATED ORAL ONCE
Refills: 0 | Status: DISCONTINUED | OUTPATIENT
Start: 2023-06-21 | End: 2023-06-21

## 2023-06-21 RX ORDER — MORPHINE SULFATE 50 MG/1
4 CAPSULE, EXTENDED RELEASE ORAL ONCE
Refills: 0 | Status: DISCONTINUED | OUTPATIENT
Start: 2023-06-21 | End: 2023-06-21

## 2023-06-21 RX ORDER — PIPERACILLIN AND TAZOBACTAM 4; .5 G/20ML; G/20ML
3.38 INJECTION, POWDER, LYOPHILIZED, FOR SOLUTION INTRAVENOUS ONCE
Refills: 0 | Status: COMPLETED | OUTPATIENT
Start: 2023-06-21 | End: 2023-06-21

## 2023-06-21 RX ORDER — SODIUM CHLORIDE 9 MG/ML
500 INJECTION, SOLUTION INTRAVENOUS
Refills: 0 | Status: COMPLETED | OUTPATIENT
Start: 2023-06-21 | End: 2023-06-22

## 2023-06-21 RX ORDER — VANCOMYCIN HCL 1 G
1000 VIAL (EA) INTRAVENOUS ONCE
Refills: 0 | Status: COMPLETED | OUTPATIENT
Start: 2023-06-21 | End: 2023-06-21

## 2023-06-21 RX ORDER — HYDRALAZINE HCL 50 MG
5 TABLET ORAL EVERY 6 HOURS
Refills: 0 | Status: DISCONTINUED | OUTPATIENT
Start: 2023-06-21 | End: 2023-06-23

## 2023-06-21 RX ORDER — PIPERACILLIN AND TAZOBACTAM 4; .5 G/20ML; G/20ML
3.38 INJECTION, POWDER, LYOPHILIZED, FOR SOLUTION INTRAVENOUS EVERY 12 HOURS
Refills: 0 | Status: COMPLETED | OUTPATIENT
Start: 2023-06-22 | End: 2023-06-24

## 2023-06-21 RX ADMIN — PIPERACILLIN AND TAZOBACTAM 200 GRAM(S): 4; .5 INJECTION, POWDER, LYOPHILIZED, FOR SOLUTION INTRAVENOUS at 22:23

## 2023-06-21 RX ADMIN — Medication 250 MILLIGRAM(S): at 23:48

## 2023-06-21 NOTE — H&P ADULT - PROBLEM SELECTOR PLAN 2
ED reached out to Dr Beth who plans for HD in AM Troponin elevated.  Will trend  No current chest pain.    Will monitor on tele

## 2023-06-21 NOTE — H&P ADULT - PROBLEM SELECTOR PLAN 1
Patient currently with significant AMS  Unclear baseline mental status  WBCs and leuk esterase in urine  Started on zosyn.  Follow blood cultures  Will make NPO due to poor mentation, light IV hydration  Swallow eval in am

## 2023-06-21 NOTE — ED PROVIDER NOTE - ATTENDING APP SHARED VISIT CONTRIBUTION OF CARE
I have personally seen and examined this pt I have fully participated in the care of this pt I have made amendments to the documentation where appropriate and otherwise hx, exam and plans as documented by the ACP.

## 2023-06-21 NOTE — H&P ADULT - NSHPPHYSICALEXAM_GEN_ALL_CORE
Physical exam:  General: patient in no acute distress, resting comfortably  Head:  Atraumatic, Normocephalic  Eyes: clear sclera, R eye laterally deviated  Neck: Supple, thyroid nontender, non enlarged  Cardio: S1/S2 +ve, regular rate and rhythm, no M/G/R  Resp: clear to ausculation bilaterally, no rales or wheezes  GI: abdomen soft, nontender, non distended, no guarding, BS +ve x 4  Ext: R BKA, L AKA  Neuro: Patient with very poor mental status - AAOx1, responsive to touch, not very responsive to voice, CN 2-12 intact, no significant motor or sensory deficits.  Skin: No rashes or lesions

## 2023-06-21 NOTE — ED ADULT NURSE NOTE - CHIEF COMPLAINT QUOTE
as per ems, pt from dialysis, became aggressive and combative, orient to self in dialysis. did not receive dialysis today. hx: ESRD on HD m-w-f, bilateral AKA, htn, CAD, GERD, hyperlipidemia, o22LNC at home, blind, creole speaking. Rt FA 20G saline lock placed by ems.

## 2023-06-21 NOTE — ED PROVIDER NOTE - EKG ADDITIONAL QUESTION - PERFORMED INDEPENDENT VISUALIZATION
Assessment/Plan:    Bartholin's gland cyst  Bartholin's cyst, recommend I&D, patient declined at this time, advised may get worse and more painful  Recommended warm sitz baths and soaks, RTO in 1 week for recheck, if not improved will I&D  Subjective:      Patient ID: Arlette Clarke is a 45 y o  female  New patient presents to the office, referred from her PCP, complaining of R labial lump x 2 weeks  Patient states that she thought it was an ingrown hair and it started out small but has progressively worsened  Lump is tender to touch but does not hurt without touching it  She is currently on Cipro for a urinary tract infection  Patient denies any fever, chills, or discharge coming from lump  She has not tried any over the counter treatments and has not tried warm soaks  History reviewed  No pertinent past medical history  Past Surgical History:   Procedure Laterality Date     SECTION       Family History   Problem Relation Age of Onset    No Known Problems Mother     No Known Problems Father      Review of Systems   Constitutional: Negative for chills and fever  Gastrointestinal: Negative for abdominal pain  Genitourinary: Positive for genital sores  Negative for pelvic pain, vaginal bleeding, vaginal discharge and vaginal pain  See HPI       Objective:  /68 (BP Location: Left arm, Patient Position: Sitting, Cuff Size: Large)   Wt 62 1 kg (137 lb)   LMP  (Within Years) Comment: 2016  Breastfeeding? No   BMI 22 45 kg/m²      Physical Exam   Genitourinary:       There is lesion on the right labia  There is no tenderness on the right labia  There is no tenderness or lesion on the left labia  No bleeding in the vagina  No vaginal discharge found  Yes

## 2023-06-21 NOTE — ED ADULT TRIAGE NOTE - CHIEF COMPLAINT QUOTE
as per ems, pt from dialysis, became aggressive and combative, orient to self in dialysis. did not receive dialysis today. hx: ESRD on HD m-w-f, bilateral AKA, htn, CAD, GERD, hyperlipidemia, o22LNC at home. Rt FA 20G saline lock placed by ems. as per ems, pt from dialysis, became aggressive and combative, orient to self in dialysis. did not receive dialysis today. hx: ESRD on HD m-w-f, bilateral AKA, htn, CAD, GERD, hyperlipidemia, o22LNC at home, blind, creole speaking. Rt FA 20G saline lock placed by ems. as per ems, pt from dialysis, became aggressive and combative, orient to self in dialysis. did not receive dialysis today.  pt calm in triage area. hx: ESRD on HD m-w-f, bilateral AKA, htn, CAD, GERD, hyperlipidemia, o22LNC at home, blind, creole speaking. Rt FA 20G saline lock placed by ems.

## 2023-06-21 NOTE — H&P ADULT - HISTORY OF PRESENT ILLNESS
Patient is a 68M with a PMH of ESRD on HD MWF, functional quadriplegia (R sided BKA + L sided AKA,) blindness, CAD, HTN, HLD, on 2L home O2 who presents to the ED for AMS.  Patient currently AAOx1, unable to provide history.  Per ED staff, patient was sent to the ED from his HD center earlier today.  Patient reportedly became confused and combative with staff - who were unable to start HD.  ED also reached out to family members who stated that the patient currently lives in a rehab center.  Was noted to be restless yesterday evening.  Vitals stable, labs show elevated troponin levels.  Will admit to tele.

## 2023-06-21 NOTE — ED PROVIDER NOTE - OBJECTIVE STATEMENT
69 y/o male with ESRD MWF, htn, high chol, DM, right BKA and left AKA from rehab sent from dialysis for being aggressive and combative. As per dialysis note pt did not received dialysis today. Pt currently is calm and denies any pain. Pt is alert and oriented x1. Spoke with niece over the phone pt does not have history of dementia and was not acting himself since yesterday. Pt was recently treated for PNA few weeks ago.

## 2023-06-21 NOTE — ED ADULT NURSE NOTE - NSFALLHARMRISKINTERV_ED_ALL_ED

## 2023-06-21 NOTE — CHART NOTE - NSCHARTNOTEFT_GEN_A_CORE
Patient reassessed, found to be much more alert  As per RN, patient mental status improved  Was able to state that he wanted juice - was able to swallow without difficulty  Consider possible post ictal period s/p seizure?    Neuro eval in am WDL

## 2023-06-21 NOTE — ED PROVIDER NOTE - PHYSICAL EXAMINATION
GEN: Awake, alert, interactive, NAD.  HEAD AND NECK: NC/AT. Airway patent. Neck supple.   EYES:  Clear b/l.    ENT: Moist mucus membranes.   CARDIAC: Regular rate, regular rhythm. No evident pedal edema.    RESP/CHEST: Normal respiratory effort with no use of accessory muscles or retractions. Clear throughout on auscultation.  ABD: soft, non-distended, non-tender. No rebound, no guarding.   SKIN: Warm, dry, intact normal color. No rash.   NEURO: AOx1, CN II-XII grossly intact, no focal deficits.   PSYCH: Appropriate mood and affect. GEN: Awake, alert, interactive, NAD.  HEAD AND NECK: NC/AT. Airway patent. Neck supple.   EYES:  Clear b/l.  (+) right eye deviation   ENT: Moist mucus membranes.   CARDIAC: Regular rate, regular rhythm. No evident pedal edema.    RESP/CHEST: Normal respiratory effort with no use of accessory muscles or retractions. Clear throughout on auscultation.  ABD: soft, non-distended, non-tender. No rebound, no guarding.   SKIN: Warm, dry, intact normal color. No rash.   NEURO: AOx1, CN II-XII grossly intact, no focal deficits.   PSYCH: Appropriate mood and affect.

## 2023-06-21 NOTE — H&P ADULT - NSHPLABSRESULTS_GEN_ALL_CORE
Recent Vitals  T(C): 37.1 (23 @ 19:43), Max: 37.1 (23 @ 19:43)  HR: 66 (23 @ 19:43) (60 - 66)  BP: 133/67 (23 @ 19:43) (133/67 - 135/68)  RR: 18 (23 @ 19:43) (18 - 18)  SpO2: 100% (23 @ 19:43) (100% - 100%)                        8.7    6.48  )-----------( 173      ( 2023 16:13 )             28.5         136  |  98  |  45<H>  ----------------------------<  109<H>  4.3   |  35<H>  |  4.47<H>    Ca    8.6      2023 16:13    TPro  7.3  /  Alb  2.7<L>  /  TBili  0.3  /  DBili  x   /  AST  25  /  ALT  33  /  AlkPhos  90  -    PT/INR - ( 2023 16:13 )   PT: 13.3 sec;   INR: 1.11 ratio         PTT - ( 2023 16:13 )  PTT:29.4 sec  LIVER FUNCTIONS - ( 2023 16:13 )  Alb: 2.7 g/dL / Pro: 7.3 gm/dL / ALK PHOS: 90 U/L / ALT: 33 U/L / AST: 25 U/L / GGT: x           Urinalysis Basic - ( 2023 20:12 )    Color: Latha / Appearance: very cloudy / S.010 / pH: x  Gluc: x / Ketone: Trace  / Bili: Small / Urobili: Negative mg/dL   Blood: x / Protein: 500 mg/dL / Nitrite: Negative   Leuk Esterase: Moderate / RBC: >50 /HPF / WBC >50   Sq Epi: x / Non Sq Epi: x / Bacteria: TNTC        Home Medications:  acetaminophen 325 mg oral tablet: 2 tab(s) orally every 6 hours, As needed, Moderate Pain (4 - 6) (2018 10:37)  aspirin 81 mg oral delayed release tablet: 1 tab(s) orally once a day (2018 10:37)  atorvastatin 80 mg oral tablet: 1 tab(s) orally once a day (at bedtime) (2021 16:07)  calcium acetate 667 mg oral tablet: 1 tab(s) orally 3 times a day (2021 19:30)  labetalol 200 mg oral tablet: 1 tab(s) orally 3 times a day(6am, 2pm,10pm) (2021 19:29)  lactulose 10 g/15 mL oral syrup: 30 milliliter(s) orally once a day (at bedtime) (2021 19:32)  latanoprost 0.005% ophthalmic solution: 1 drop(s) to each affected eye once a day (in the evening) (2021 19:30)  NIFEdipine 60 mg oral tablet, extended release: 1 tab(s) orally once a day (at bedtime) (2021 16:07)  pantoprazole 40 mg oral delayed release tablet: 1 tab(s) orally once a day (2021 16:07)  Pred Forte 1% ophthalmic suspension: 1 drop(s) to each lt eye 2 times a day (2021 19:31)  senna oral tablet: 2 tab(s) orally once a day (at bedtime) (11 May 2018 16:49)

## 2023-06-21 NOTE — PATIENT PROFILE ADULT - FALL HARM RISK - HARM RISK INTERVENTIONS

## 2023-06-21 NOTE — ED ADULT NURSE NOTE - OBJECTIVE STATEMENT
as per ems, pt from dialysis, became aggressive and combative, orient to self in dialysis. did not receive dialysis today. hx: ESRD on HD m-w-f, bilateral AKA, htn, CAD, GERD, hyperlipidemia, o22LNC at home, blind, creole speaking. Rt FA 20G saline lock placed by ems. attempt to use  Tim unsuccessful. pt stated could hear , did not answer any further questions. via staff interpretation, pt denies sob, chest pain, or any further complaints.

## 2023-06-21 NOTE — ED PROVIDER NOTE - CLINICAL SUMMARY MEDICAL DECISION MAKING FREE TEXT BOX
69 y/o male with ESRD mwf, dm, htn, CVA, legally blind here with AMS today at diaylsis.   Will check labs, ct head, ekg and reassess. 67 y/o male with ESRD mwf, dm, htn, CVA, legally blind here with AMS today at diaylsis.   Will check labs, ct head, ekg and reassess.    labs reviewed and mildly elevated troponin 245.5. Pt denies chest pain, dyspnea. K normal.   Renal Dr Beth and made aware.   Ct head shows old infaracts.   Spoke with niece regarding the right eye deviation, last normal unknown, states pt was seen yesterday at 3pm in rehab and seemed alittle restless and had an episode of confusion last week which has resolved.  Pt is not a candidate for TPA or endovascular intervention.  Will admit the patient for AMS and elevated troponin

## 2023-06-22 LAB
ANION GAP SERPL CALC-SCNC: 4 MMOL/L — LOW (ref 5–17)
BUN SERPL-MCNC: 49 MG/DL — HIGH (ref 7–23)
CALCIUM SERPL-MCNC: 8.6 MG/DL — SIGNIFICANT CHANGE UP (ref 8.5–10.1)
CHLORIDE SERPL-SCNC: 98 MMOL/L — SIGNIFICANT CHANGE UP (ref 96–108)
CO2 SERPL-SCNC: 34 MMOL/L — HIGH (ref 22–31)
CREAT SERPL-MCNC: 5.02 MG/DL — HIGH (ref 0.5–1.3)
EGFR: 12 ML/MIN/1.73M2 — LOW
GLUCOSE SERPL-MCNC: 94 MG/DL — SIGNIFICANT CHANGE UP (ref 70–99)
HCT VFR BLD CALC: 28.2 % — LOW (ref 39–50)
HGB BLD-MCNC: 8.8 G/DL — LOW (ref 13–17)
MCHC RBC-ENTMCNC: 29.1 PG — SIGNIFICANT CHANGE UP (ref 27–34)
MCHC RBC-ENTMCNC: 31.2 G/DL — LOW (ref 32–36)
MCV RBC AUTO: 93.4 FL — SIGNIFICANT CHANGE UP (ref 80–100)
NRBC # BLD: 0 /100 WBCS — SIGNIFICANT CHANGE UP (ref 0–0)
PLATELET # BLD AUTO: 159 K/UL — SIGNIFICANT CHANGE UP (ref 150–400)
POTASSIUM SERPL-MCNC: 4.4 MMOL/L — SIGNIFICANT CHANGE UP (ref 3.5–5.3)
POTASSIUM SERPL-SCNC: 4.4 MMOL/L — SIGNIFICANT CHANGE UP (ref 3.5–5.3)
RBC # BLD: 3.02 M/UL — LOW (ref 4.2–5.8)
RBC # FLD: 17.9 % — HIGH (ref 10.3–14.5)
SODIUM SERPL-SCNC: 136 MMOL/L — SIGNIFICANT CHANGE UP (ref 135–145)
TROPONIN I, HIGH SENSITIVITY RESULT: 204.9 NG/L — HIGH
WBC # BLD: 5.56 K/UL — SIGNIFICANT CHANGE UP (ref 3.8–10.5)
WBC # FLD AUTO: 5.56 K/UL — SIGNIFICANT CHANGE UP (ref 3.8–10.5)

## 2023-06-22 PROCEDURE — 99232 SBSQ HOSP IP/OBS MODERATE 35: CPT

## 2023-06-22 RX ORDER — PANTOPRAZOLE SODIUM 20 MG/1
40 TABLET, DELAYED RELEASE ORAL
Refills: 0 | Status: DISCONTINUED | OUTPATIENT
Start: 2023-06-22 | End: 2023-06-23

## 2023-06-22 RX ORDER — HEPARIN SODIUM 5000 [USP'U]/ML
5000 INJECTION INTRAVENOUS; SUBCUTANEOUS EVERY 8 HOURS
Refills: 0 | Status: DISCONTINUED | OUTPATIENT
Start: 2023-06-22 | End: 2023-06-28

## 2023-06-22 RX ORDER — ASPIRIN/CALCIUM CARB/MAGNESIUM 324 MG
81 TABLET ORAL DAILY
Refills: 0 | Status: DISCONTINUED | OUTPATIENT
Start: 2023-06-22 | End: 2023-06-24

## 2023-06-22 RX ORDER — CHLORHEXIDINE GLUCONATE 213 G/1000ML
1 SOLUTION TOPICAL DAILY
Refills: 0 | Status: DISCONTINUED | OUTPATIENT
Start: 2023-06-22 | End: 2023-06-28

## 2023-06-22 RX ORDER — ATORVASTATIN CALCIUM 80 MG/1
40 TABLET, FILM COATED ORAL AT BEDTIME
Refills: 0 | Status: DISCONTINUED | OUTPATIENT
Start: 2023-06-22 | End: 2023-06-28

## 2023-06-22 RX ORDER — NIFEDIPINE 30 MG
60 TABLET, EXTENDED RELEASE 24 HR ORAL DAILY
Refills: 0 | Status: DISCONTINUED | OUTPATIENT
Start: 2023-06-22 | End: 2023-06-23

## 2023-06-22 RX ORDER — CALCIUM ACETATE 667 MG
667 TABLET ORAL
Refills: 0 | Status: DISCONTINUED | OUTPATIENT
Start: 2023-06-22 | End: 2023-06-28

## 2023-06-22 RX ADMIN — SODIUM CHLORIDE 50 MILLILITER(S): 9 INJECTION, SOLUTION INTRAVENOUS at 00:57

## 2023-06-22 RX ADMIN — HEPARIN SODIUM 5000 UNIT(S): 5000 INJECTION INTRAVENOUS; SUBCUTANEOUS at 21:34

## 2023-06-22 RX ADMIN — ATORVASTATIN CALCIUM 40 MILLIGRAM(S): 80 TABLET, FILM COATED ORAL at 21:34

## 2023-06-22 RX ADMIN — PIPERACILLIN AND TAZOBACTAM 25 GRAM(S): 4; .5 INJECTION, POWDER, LYOPHILIZED, FOR SOLUTION INTRAVENOUS at 10:32

## 2023-06-22 RX ADMIN — CHLORHEXIDINE GLUCONATE 1 APPLICATION(S): 213 SOLUTION TOPICAL at 18:25

## 2023-06-22 RX ADMIN — PIPERACILLIN AND TAZOBACTAM 25 GRAM(S): 4; .5 INJECTION, POWDER, LYOPHILIZED, FOR SOLUTION INTRAVENOUS at 21:34

## 2023-06-22 NOTE — SWALLOW BEDSIDE ASSESSMENT ADULT - ORAL PHASE
Delayed oral transit time Within functional limits Decreased anterior-posterior movement of the bolus/Lingual stasis

## 2023-06-22 NOTE — SWALLOW BEDSIDE ASSESSMENT ADULT - H & P REVIEW
"Patient is a 68M with a PMH of ESRD on HD MWF, functional quadriplegia (R sided BKA + L sided AKA,) blindness, CAD, HTN, HLD, on 2L home O2 who presents to the ED for AMS.  Patient currently AAOx1, unable to provide history.  Per ED staff, patient was sent to the ED from his HD center earlier today.  Patient reportedly became confused and combative with staff - who were unable to start HD.  ED also reached out to family members who stated that the patient currently lives in a rehab center.  Was noted to be restless yesterday evening.  Vitals stable, labs show elevated troponin levels.  Will admit to tele."/yes

## 2023-06-22 NOTE — PROGRESS NOTE ADULT - PROBLEM SELECTOR PLAN 1
Patient currently with significant AMS  Unclear baseline mental status  WBCs and leuk esterase in urine  was Started on zosyn.  Follow blood cultures f/u urine cx  seen by speech swallow diet changed Patient currently with significant AMS  Unclear baseline mental status  WBCs and leuk esterase in urine  was Started on zosyn.  Follow blood cultures f/u urine cx  seen by speech swallow diet changed   f/u mri and eeg

## 2023-06-22 NOTE — CONSULT NOTE ADULT - SUBJECTIVE AND OBJECTIVE BOX
Carlos Dumont MD  Nephrology      JAMES PATRICK  68y  Patient is a 68y old  Male who presents with a chief complaint of AMS (2023 21:07)    HPI:   ESRD on HD at [facility], Resident at [facility].  He became combative at HD yesterday.      HEALTH ISSUES - PROBLEM Dx:  Metabolic encephalopathy    ESRD on dialysis    Essential hypertension    Hyperlipidemia    CAD (coronary artery disease)    Troponin level elevated          MEDICATIONS  (STANDING):  chlorhexidine 2% Cloths 1 Application(s) Topical daily  piperacillin/tazobactam IVPB.. 3.375 Gram(s) IV Intermittent every 12 hours    MEDICATIONS  (PRN):  hydrALAZINE Injectable 5 milliGRAM(s) IV Push every 6 hours PRN SBP> 170    Vital Signs Last 24 Hrs  T(C): 36.9 (2023 10:45), Max: 37.1 (2023 19:43)  T(F): 98.4 (2023 10:45), Max: 98.8 (2023 19:43)  HR: 60 (2023 10:45) (60 - 68)  BP: 137/75 (2023 10:45) (133/67 - 145/82)  BP(mean): --  RR: 17 (2023 10:45) (17 - 20)  SpO2: 100% (2023 10:45) (99% - 100%)    Parameters below as of 2023 10:45  Patient On (Oxygen Delivery Method): nasal cannula  O2 Flow (L/min): 2    Daily     Daily Weight in k.6 (2023 00:36)    PHYSICAL EXAM:  Constitutional: He appears comfortable and not distressed. Not diaphoretic.    Neck:  The thyroid is normal. Trachea is midline.     Respiratory: The lungs are clear to auscultation. No dullness and expansion is normal.    Cardiovascular: S1 and S2 are normal. No murmurs, rubs or gallops are present.    Gastrointestinal: The abdomen is soft. No tenderness is present. No masses are present. Bowel sounds are normal.    Genitourinary: The bladder is not distended. No CVA tenderness is present.    Extremities: No edema is noted. Left AKA, right BKA.    Neurological: Confused but awake. Tone, power and sensation are normal.    Skin: No lesions are seen  or palpated.    Lymph Nodes: No lymphadenopathy is present.                            8.8    5.56  )-----------( 159      ( 2023 07:15 )             28.2         136  |  98  |  49<H>  ----------------------------<  94  4.4   |  34<H>  |  5.02<H>    Ca    8.6      2023 07:15    TPro  7.3  /  Alb  2.7<L>  /  TBili  0.3  /  DBili  x   /  AST  25  /  ALT  33  /  AlkPhos  90      Urinalysis Basic - ( 2023 07:15 )    Color: x / Appearance: x / SG: x / pH: x  Gluc: 94 mg/dL / Ketone: x  / Bili: x / Urobili: x   Blood: x / Protein: x / Nitrite: x   Leuk Esterase: x / RBC: x / WBC x   Sq Epi: x / Non Sq Epi: x / Bacteria: x    < from: CT Head No Cont (23 @ 18:46) >  1. Old bilateral posterior circulation ischemic events, stable in   appearance compared with the prior.  2. Atherosclerotic changes.  3. No acute intracranial process. Specifically, no acute intracranial   hemorrhage. No large arterial distribution acute infarct. If altered   mental status persists, consider further evaluation via MR imaging to   include DWI and ADC mapping techniques, provided there are no   contraindications.      < end of copied text >

## 2023-06-22 NOTE — SWALLOW BEDSIDE ASSESSMENT ADULT - COMMENTS
CXR 6/21/2023INTERPRETATION:  AP supine chest on June 21, 2023 at 5:21 PM. Patient has altered mental status.Heart is enlarged. There is a loop recorder which is new since May 13, 2020.There is a fairly large loculated left effusion which is new since the earlier study.  IMPRESSION: Reported presently seen.Fairly large loculated left effusion at this time.

## 2023-06-22 NOTE — CONSULT NOTE ADULT - ASSESSMENT
ESRD:  On HD at [facility].  - HD tomorrow.  - Renal diet.    Hypertension:  - Start ARB.  - Follow up BM.    Anemia  - TAINA at HD.    Acute Mental Status Change:  CVA vs Sepsis.  - Care as per Medicine.

## 2023-06-22 NOTE — SWALLOW BEDSIDE ASSESSMENT ADULT - SWALLOW EVAL: DIAGNOSIS
oropharyngeal swallow marked by decreased labial seal to cup, reduced mastication, bolus manipulation/formation and transport posterior causing lingual residue for solid- cleared with liquid wash, +pharyngeal swallow trigger and hyolaryngeal elevation/excursion to palpation. change in respiration/phonation with thin liquid oropharyngeal swallow marked by decreased labial seal to cup, reduced mastication bolus manipulation/formation and transport posterior causing mild lingual residue for solid- cleared with liquid wash, +pharyngeal swallow trigger and hyolaryngeal elevation/excursion to palpation. change in respiration/phonation with thin liquid

## 2023-06-22 NOTE — PROGRESS NOTE ADULT - SUBJECTIVE AND OBJECTIVE BOX
Patient is a 68y old  Male who presents with a chief complaint of AMS (22 Jun 2023 11:17)      OVERNIGHT EVENTS:  none    MEDICATIONS  (STANDING):  chlorhexidine 2% Cloths 1 Application(s) Topical daily  piperacillin/tazobactam IVPB.. 3.375 Gram(s) IV Intermittent every 12 hours    MEDICATIONS  (PRN):  hydrALAZINE Injectable 5 milliGRAM(s) IV Push every 6 hours PRN SBP> 170      Allergies    No Known Allergies    Intolerances        SUBJECTIVE: in bed in NAD, no acute events overnight     T(F): 97.3 (06-22-23 @ 15:33), Max: 98.8 (06-21-23 @ 19:43)  HR: 63 (06-22-23 @ 15:33) (60 - 68)  BP: 155/77 (06-22-23 @ 15:33) (133/67 - 155/77)  RR: 18 (06-22-23 @ 15:33) (17 - 20)  SpO2: 100% (06-22-23 @ 15:33) (99% - 100%)  Wt(kg): --    PHYSICAL EXAM:  GENERAL: NAD, well-groomed, well-developed  HEAD:  Atraumatic, Normocephalic  EYES: EOMI, PERRLA, conjunctiva and sclera clear  ENMT: No tonsillar erythema, exudates, or enlargement; Moist mucous membranes, Good dentition, No lesions  NECK: Supple,   CHEST/LUNG: Clear to  auscultation bilaterally; No rales, rhonchi, wheezing, or rubs  bilaterally  HEART: Regular rate and rhythm; No murmurs, rubs, or gallops  ABDOMEN: Soft, Nontender, Nondistended; Bowel sounds present  EXTREMITIES:  right bka, left aka   SKIN: No rashes or lesions  NERVOUS SYSTEM:  Alert & Oriented X2     LABS:                        8.8    5.56  )-----------( 159      ( 22 Jun 2023 07:15 )             28.2     06-22    136  |  98  |  49<H>  ----------------------------<  94  4.4   |  34<H>  |  5.02<H>    Ca    8.6      22 Jun 2023 07:15    TPro  7.3  /  Alb  2.7<L>  /  TBili  0.3  /  DBili  x   /  AST  25  /  ALT  33  /  AlkPhos  90  06-21    PT/INR - ( 21 Jun 2023 16:13 )   PT: 13.3 sec;   INR: 1.11 ratio         PTT - ( 21 Jun 2023 16:13 )  PTT:29.4 sec  Urinalysis Basic - ( 22 Jun 2023 07:15 )    Color: x / Appearance: x / SG: x / pH: x  Gluc: 94 mg/dL / Ketone: x  / Bili: x / Urobili: x   Blood: x / Protein: x / Nitrite: x   Leuk Esterase: x / RBC: x / WBC x   Sq Epi: x / Non Sq Epi: x / Bacteria: x      Cultures;   CAPILLARY BLOOD GLUCOSE        Lipid panel:           RADIOLOGY & ADDITIONAL TESTS:      Imaging Personally Reviewed:  [ x] YES      Consultant(s) Notes Reviewed:  [x ] YES     Care Discussed with [x ] Consultants [X ] Patient [x ] Family  [x ]    [x ]  Other; RN Patient is a 68y old  Male who presents with a chief complaint of AMS (22 Jun 2023 11:17)      OVERNIGHT EVENTS:  none    MEDICATIONS  (STANDING):  chlorhexidine 2% Cloths 1 Application(s) Topical daily  piperacillin/tazobactam IVPB.. 3.375 Gram(s) IV Intermittent every 12 hours    MEDICATIONS  (PRN):  hydrALAZINE Injectable 5 milliGRAM(s) IV Push every 6 hours PRN SBP> 170      Allergies    No Known Allergies    Intolerances        SUBJECTIVE: in bed in NAD, no acute events overnight     T(F): 97.3 (06-22-23 @ 15:33), Max: 98.8 (06-21-23 @ 19:43)  HR: 63 (06-22-23 @ 15:33) (60 - 68)  BP: 155/77 (06-22-23 @ 15:33) (133/67 - 155/77)  RR: 18 (06-22-23 @ 15:33) (17 - 20)  SpO2: 100% (06-22-23 @ 15:33) (99% - 100%)  Wt(kg): --    PHYSICAL EXAM:  GENERAL: NAD, well-groomed, well-developed  HEAD:  Atraumatic, Normocephalic  EYES: EOMI, PERRLA, conjunctiva and sclera clear  ENMT: No tonsillar erythema, exudates, or enlargement; Moist mucous membranes, Good dentition, No lesions  NECK: Supple,   CHEST/LUNG: Clear to  auscultation bilaterally; No rales, rhonchi, wheezing, or rubs  bilaterally  HEART: Regular rate and rhythm; No murmurs, rubs, or gallops  ABDOMEN: Soft, Nontender, Nondistended; Bowel sounds present  EXTREMITIES:  right bka, left aka   SKIN: No rashes or lesions  NERVOUS SYSTEM:  Alert & Oriented X2     LABS:                        8.8    5.56  )-----------( 159      ( 22 Jun 2023 07:15 )             28.2     06-22    136  |  98  |  49<H>  ----------------------------<  94  4.4   |  34<H>  |  5.02<H>    Ca    8.6      22 Jun 2023 07:15    TPro  7.3  /  Alb  2.7<L>  /  TBili  0.3  /  DBili  x   /  AST  25  /  ALT  33  /  AlkPhos  90  06-21    PT/INR - ( 21 Jun 2023 16:13 )   PT: 13.3 sec;   INR: 1.11 ratio         PTT - ( 21 Jun 2023 16:13 )  PTT:29.4 sec  Urinalysis Basic - ( 22 Jun 2023 07:15 )    Color: x / Appearance: x / SG: x / pH: x  Gluc: 94 mg/dL / Ketone: x  / Bili: x / Urobili: x   Blood: x / Protein: x / Nitrite: x   Leuk Esterase: x / RBC: x / WBC x   Sq Epi: x / Non Sq Epi: x / Bacteria: x      Cultures;   CAPILLARY BLOOD GLUCOSE        Lipid panel:           RADIOLOGY & ADDITIONAL TESTS:  < from: CT Head No Cont (06.21.23 @ 18:46) >  IMPRESSION:  1. Old bilateral posterior circulation ischemic events, stable in   appearance compared with the prior.  2. Atherosclerotic changes.  3. No acute intracranial process. Specifically, no acute intracranial   hemorrhage. No large arterial distribution acute infarct. If altered   mental status persists, consider further evaluation via MR imaging to   include DWI and ADC mapping techniques, provided there are no   contraindications.    < end of copied text >      Imaging Personally Reviewed:  [ x] YES      Consultant(s) Notes Reviewed:  [x ] YES     Care Discussed with [x ] Consultants [X ] Patient [x ] Family  [x ]    [x ]  Other; RN Patient is a 68y old  Male who presents with a chief complaint of AMS (22 Jun 2023 11:17)    even with  patient is consfused    OVERNIGHT EVENTS:  none    MEDICATIONS  (STANDING):  chlorhexidine 2% Cloths 1 Application(s) Topical daily  piperacillin/tazobactam IVPB.. 3.375 Gram(s) IV Intermittent every 12 hours    MEDICATIONS  (PRN):  hydrALAZINE Injectable 5 milliGRAM(s) IV Push every 6 hours PRN SBP> 170      Allergies    No Known Allergies    Intolerances        SUBJECTIVE: in bed in NAD, no acute events overnight     T(F): 97.3 (06-22-23 @ 15:33), Max: 98.8 (06-21-23 @ 19:43)  HR: 63 (06-22-23 @ 15:33) (60 - 68)  BP: 155/77 (06-22-23 @ 15:33) (133/67 - 155/77)  RR: 18 (06-22-23 @ 15:33) (17 - 20)  SpO2: 100% (06-22-23 @ 15:33) (99% - 100%)  Wt(kg): --    PHYSICAL EXAM:  GENERAL: NAD, well-groomed, well-developed  HEAD:  Atraumatic, Normocephalic  EYES: EOMI, PERRLA, conjunctiva and sclera clear  ENMT: No tonsillar erythema, exudates, or enlargement; Moist mucous membranes, Good dentition, No lesions  NECK: Supple,   CHEST/LUNG: Clear to  auscultation bilaterally; No rales, rhonchi, wheezing, or rubs  bilaterally  HEART: Regular rate and rhythm; No murmurs, rubs, or gallops  ABDOMEN: Soft, Nontender, Nondistended; Bowel sounds present  EXTREMITIES:  right bka, left aka   SKIN: No rashes or lesions  NERVOUS SYSTEM:  Alert & Oriented X2     LABS:                        8.8    5.56  )-----------( 159      ( 22 Jun 2023 07:15 )             28.2     06-22    136  |  98  |  49<H>  ----------------------------<  94  4.4   |  34<H>  |  5.02<H>    Ca    8.6      22 Jun 2023 07:15    TPro  7.3  /  Alb  2.7<L>  /  TBili  0.3  /  DBili  x   /  AST  25  /  ALT  33  /  AlkPhos  90  06-21    PT/INR - ( 21 Jun 2023 16:13 )   PT: 13.3 sec;   INR: 1.11 ratio         PTT - ( 21 Jun 2023 16:13 )  PTT:29.4 sec  Urinalysis Basic - ( 22 Jun 2023 07:15 )    Color: x / Appearance: x / SG: x / pH: x  Gluc: 94 mg/dL / Ketone: x  / Bili: x / Urobili: x   Blood: x / Protein: x / Nitrite: x   Leuk Esterase: x / RBC: x / WBC x   Sq Epi: x / Non Sq Epi: x / Bacteria: x      Cultures;   CAPILLARY BLOOD GLUCOSE        Lipid panel:           RADIOLOGY & ADDITIONAL TESTS:  < from: CT Head No Cont (06.21.23 @ 18:46) >  IMPRESSION:  1. Old bilateral posterior circulation ischemic events, stable in   appearance compared with the prior.  2. Atherosclerotic changes.  3. No acute intracranial process. Specifically, no acute intracranial   hemorrhage. No large arterial distribution acute infarct. If altered   mental status persists, consider further evaluation via MR imaging to   include DWI and ADC mapping techniques, provided there are no   contraindications.    < end of copied text >      Imaging Personally Reviewed:  [ x] YES      Consultant(s) Notes Reviewed:  [x ] YES     Care Discussed with [x ] Consultants [X ] Patient [x ] Family  [x ]    [x ]  Other; RN

## 2023-06-22 NOTE — SWALLOW BEDSIDE ASSESSMENT ADULT - SLP GENERAL OBSERVATIONS
pt seen bedside sitting upright in 2D on NC 02, alert and oriented to self. pt pleasantly confused, he inconsistently responded to simple questions for assessment, verbalized wants and noted difficulty following one step directions. speech intelligibility fair to good 2/2 imprecise articulation/low volume.

## 2023-06-23 ENCOUNTER — RESULT REVIEW (OUTPATIENT)
Age: 68
End: 2023-06-23

## 2023-06-23 LAB
A1C WITH ESTIMATED AVERAGE GLUCOSE RESULT: 5.1 % — SIGNIFICANT CHANGE UP (ref 4–5.6)
ALBUMIN FLD-MCNC: 1.3 G/DL — SIGNIFICANT CHANGE UP
ALBUMIN SERPL ELPH-MCNC: 2.7 G/DL — LOW (ref 3.3–5)
ALP SERPL-CCNC: 84 U/L — SIGNIFICANT CHANGE UP (ref 40–120)
ALT FLD-CCNC: 27 U/L — SIGNIFICANT CHANGE UP (ref 12–78)
ANION GAP SERPL CALC-SCNC: 4 MMOL/L — LOW (ref 5–17)
ANISOCYTOSIS BLD QL: SLIGHT — SIGNIFICANT CHANGE UP
APTT BLD: 27.4 SEC — LOW (ref 27.5–35.5)
AST SERPL-CCNC: 19 U/L — SIGNIFICANT CHANGE UP (ref 15–37)
B PERT IGG+IGM PNL SER: CLEAR — SIGNIFICANT CHANGE UP
BASO STIPL BLD QL SMEAR: PRESENT — SIGNIFICANT CHANGE UP
BASOPHILS # BLD AUTO: 0 K/UL — SIGNIFICANT CHANGE UP (ref 0–0.2)
BASOPHILS NFR BLD AUTO: 0 % — SIGNIFICANT CHANGE UP (ref 0–2)
BILIRUB SERPL-MCNC: 0.5 MG/DL — SIGNIFICANT CHANGE UP (ref 0.2–1.2)
BLD GP AB SCN SERPL QL: SIGNIFICANT CHANGE UP
BUN SERPL-MCNC: 61 MG/DL — HIGH (ref 7–23)
CALCIUM SERPL-MCNC: 8.9 MG/DL — SIGNIFICANT CHANGE UP (ref 8.5–10.1)
CHLORIDE SERPL-SCNC: 100 MMOL/L — SIGNIFICANT CHANGE UP (ref 96–108)
CK MB BLD-MCNC: 2.7 % — SIGNIFICANT CHANGE UP (ref 0–3.5)
CK MB CFR SERPL CALC: 1.1 NG/ML — SIGNIFICANT CHANGE UP (ref 0.5–3.6)
CK SERPL-CCNC: 41 U/L — SIGNIFICANT CHANGE UP (ref 26–308)
CO2 SERPL-SCNC: 31 MMOL/L — SIGNIFICANT CHANGE UP (ref 22–31)
COLOR FLD: YELLOW — SIGNIFICANT CHANGE UP
CREAT SERPL-MCNC: 6.42 MG/DL — HIGH (ref 0.5–1.3)
CULTURE RESULTS: NO GROWTH — SIGNIFICANT CHANGE UP
EGFR: 9 ML/MIN/1.73M2 — LOW
EOSINOPHIL # BLD AUTO: 0.06 K/UL — SIGNIFICANT CHANGE UP (ref 0–0.5)
EOSINOPHIL NFR BLD AUTO: 1 % — SIGNIFICANT CHANGE UP (ref 0–6)
ESTIMATED AVERAGE GLUCOSE: 100 MG/DL — SIGNIFICANT CHANGE UP (ref 68–114)
FLUID INTAKE SUBSTANCE CLASS: SIGNIFICANT CHANGE UP
GAS PNL BLDA: SIGNIFICANT CHANGE UP
GIANT PLATELETS BLD QL SMEAR: PRESENT — SIGNIFICANT CHANGE UP
GLUCOSE BLDC GLUCOMTR-MCNC: 128 MG/DL — HIGH (ref 70–99)
GLUCOSE BLDC GLUCOMTR-MCNC: 134 MG/DL — HIGH (ref 70–99)
GLUCOSE BLDC GLUCOMTR-MCNC: 165 MG/DL — HIGH (ref 70–99)
GLUCOSE FLD-MCNC: 129 MG/DL — SIGNIFICANT CHANGE UP
GLUCOSE SERPL-MCNC: 194 MG/DL — HIGH (ref 70–99)
GRAM STN FLD: SIGNIFICANT CHANGE UP
HCT VFR BLD CALC: 27 % — LOW (ref 39–50)
HGB BLD-MCNC: 8.1 G/DL — LOW (ref 13–17)
HYPOCHROMIA BLD QL: SLIGHT — SIGNIFICANT CHANGE UP
INR BLD: 1.17 RATIO — HIGH (ref 0.88–1.16)
LACTATE SERPL-SCNC: 1.6 MMOL/L — SIGNIFICANT CHANGE UP (ref 0.7–2)
LDH SERPL L TO P-CCNC: 282 U/L — HIGH (ref 50–242)
LDH SERPL L TO P-CCNC: 75 U/L — SIGNIFICANT CHANGE UP
LG PLATELETS BLD QL AUTO: SLIGHT — SIGNIFICANT CHANGE UP
LYMPHOCYTES # BLD AUTO: 0.12 K/UL — LOW (ref 1–3.3)
LYMPHOCYTES # BLD AUTO: 2 % — LOW (ref 13–44)
LYMPHOCYTES # FLD: 30 % — SIGNIFICANT CHANGE UP
MACROCYTES BLD QL: SIGNIFICANT CHANGE UP
MAGNESIUM SERPL-MCNC: 4.1 MG/DL — HIGH (ref 1.6–2.6)
MANUAL SMEAR VERIFICATION: SIGNIFICANT CHANGE UP
MCHC RBC-ENTMCNC: 28.6 PG — SIGNIFICANT CHANGE UP (ref 27–34)
MCHC RBC-ENTMCNC: 30 G/DL — LOW (ref 32–36)
MCV RBC AUTO: 95.4 FL — SIGNIFICANT CHANGE UP (ref 80–100)
MONOCYTES # BLD AUTO: 0.29 K/UL — SIGNIFICANT CHANGE UP (ref 0–0.9)
MONOCYTES NFR BLD AUTO: 5 % — SIGNIFICANT CHANGE UP (ref 2–14)
MONOS+MACROS # FLD: 57 % — SIGNIFICANT CHANGE UP
MRSA PCR RESULT.: SIGNIFICANT CHANGE UP
NEUTROPHILS # BLD AUTO: 5.33 K/UL — SIGNIFICANT CHANGE UP (ref 1.8–7.4)
NEUTROPHILS NFR BLD AUTO: 90 % — HIGH (ref 43–77)
NEUTROPHILS-BODY FLUID: 13 % — SIGNIFICANT CHANGE UP
NEUTS BAND # BLD: 1 % — SIGNIFICANT CHANGE UP (ref 0–8)
NRBC # BLD: 0 /100 — SIGNIFICANT CHANGE UP (ref 0–0)
NRBC # BLD: SIGNIFICANT CHANGE UP /100 WBCS (ref 0–0)
PHOSPHATE SERPL-MCNC: 1.5 MG/DL — LOW (ref 2.5–4.5)
PLAT MORPH BLD: ABNORMAL
PLATELET # BLD AUTO: 186 K/UL — SIGNIFICANT CHANGE UP (ref 150–400)
POLYCHROMASIA BLD QL SMEAR: SLIGHT — SIGNIFICANT CHANGE UP
POTASSIUM SERPL-MCNC: 5 MMOL/L — SIGNIFICANT CHANGE UP (ref 3.5–5.3)
POTASSIUM SERPL-SCNC: 5 MMOL/L — SIGNIFICANT CHANGE UP (ref 3.5–5.3)
PROCALCITONIN SERPL-MCNC: 1.2 NG/ML — HIGH (ref 0.02–0.1)
PROT FLD-MCNC: 2.5 G/DL — SIGNIFICANT CHANGE UP
PROT SERPL-MCNC: 7.1 GM/DL — SIGNIFICANT CHANGE UP (ref 6–8.3)
PROTHROM AB SERPL-ACNC: 14.1 SEC — HIGH (ref 10.5–13.4)
RBC # BLD: 2.83 M/UL — LOW (ref 4.2–5.8)
RBC # FLD: 18.1 % — HIGH (ref 10.3–14.5)
RBC BLD AUTO: ABNORMAL
RCV VOL RI: 3000 /UL — HIGH (ref 0–0)
S AUREUS DNA NOSE QL NAA+PROBE: DETECTED
SODIUM SERPL-SCNC: 135 MMOL/L — SIGNIFICANT CHANGE UP (ref 135–145)
SPECIMEN SOURCE FLD: SIGNIFICANT CHANGE UP
SPECIMEN SOURCE: SIGNIFICANT CHANGE UP
SPECIMEN SOURCE: SIGNIFICANT CHANGE UP
TOTAL NUCLEATED CELL COUNT, BODY FLUID: 70 /UL — SIGNIFICANT CHANGE UP
TRIGL FLD-MCNC: 14 MG/DL — SIGNIFICANT CHANGE UP
TROPONIN I, HIGH SENSITIVITY RESULT: 118 NG/L — HIGH
TSH SERPL-MCNC: 1.79 UU/ML — SIGNIFICANT CHANGE UP (ref 0.36–3.74)
TUBE TYPE: SIGNIFICANT CHANGE UP
VARIANT LYMPHS # BLD: 1 % — SIGNIFICANT CHANGE UP (ref 0–6)
WBC # BLD: 5.86 K/UL — SIGNIFICANT CHANGE UP (ref 3.8–10.5)
WBC # FLD AUTO: 5.86 K/UL — SIGNIFICANT CHANGE UP (ref 3.8–10.5)

## 2023-06-23 PROCEDURE — 95816 EEG AWAKE AND DROWSY: CPT | Mod: 26

## 2023-06-23 PROCEDURE — 71045 X-RAY EXAM CHEST 1 VIEW: CPT | Mod: 26,77

## 2023-06-23 PROCEDURE — 99291 CRITICAL CARE FIRST HOUR: CPT | Mod: 25

## 2023-06-23 PROCEDURE — 32555 ASPIRATE PLEURA W/ IMAGING: CPT

## 2023-06-23 PROCEDURE — 88312 SPECIAL STAINS GROUP 1: CPT | Mod: 26

## 2023-06-23 PROCEDURE — 99291 CRITICAL CARE FIRST HOUR: CPT

## 2023-06-23 PROCEDURE — 76937 US GUIDE VASCULAR ACCESS: CPT | Mod: 26

## 2023-06-23 PROCEDURE — 88112 CYTOPATH CELL ENHANCE TECH: CPT | Mod: 26

## 2023-06-23 PROCEDURE — 36620 INSERTION CATHETER ARTERY: CPT

## 2023-06-23 PROCEDURE — 71045 X-RAY EXAM CHEST 1 VIEW: CPT | Mod: 26

## 2023-06-23 PROCEDURE — 88305 TISSUE EXAM BY PATHOLOGIST: CPT | Mod: 26

## 2023-06-23 PROCEDURE — ZZZZZ: CPT

## 2023-06-23 PROCEDURE — 93306 TTE W/DOPPLER COMPLETE: CPT | Mod: 26

## 2023-06-23 RX ORDER — MIDODRINE HYDROCHLORIDE 2.5 MG/1
10 TABLET ORAL ONCE
Refills: 0 | Status: DISCONTINUED | OUTPATIENT
Start: 2023-06-23 | End: 2023-06-23

## 2023-06-23 RX ORDER — DOPAMINE HYDROCHLORIDE 40 MG/ML
2 INJECTION, SOLUTION, CONCENTRATE INTRAVENOUS
Qty: 400 | Refills: 0 | Status: DISCONTINUED | OUTPATIENT
Start: 2023-06-23 | End: 2023-06-24

## 2023-06-23 RX ORDER — PANTOPRAZOLE SODIUM 20 MG/1
40 TABLET, DELAYED RELEASE ORAL DAILY
Refills: 0 | Status: DISCONTINUED | OUTPATIENT
Start: 2023-06-24 | End: 2023-06-25

## 2023-06-23 RX ORDER — NOREPINEPHRINE BITARTRATE/D5W 8 MG/250ML
0.05 PLASTIC BAG, INJECTION (ML) INTRAVENOUS
Qty: 8 | Refills: 0 | Status: DISCONTINUED | OUTPATIENT
Start: 2023-06-23 | End: 2023-06-23

## 2023-06-23 RX ORDER — ONDANSETRON 8 MG/1
4 TABLET, FILM COATED ORAL EVERY 8 HOURS
Refills: 0 | Status: DISCONTINUED | OUTPATIENT
Start: 2023-06-23 | End: 2023-06-28

## 2023-06-23 RX ORDER — SODIUM CHLORIDE 9 MG/ML
500 INJECTION, SOLUTION INTRAVENOUS
Refills: 0 | Status: DISCONTINUED | OUTPATIENT
Start: 2023-06-23 | End: 2023-06-23

## 2023-06-23 RX ORDER — MIDAZOLAM HYDROCHLORIDE 1 MG/ML
2 INJECTION, SOLUTION INTRAMUSCULAR; INTRAVENOUS ONCE
Refills: 0 | Status: DISCONTINUED | OUTPATIENT
Start: 2023-06-23 | End: 2023-06-23

## 2023-06-23 RX ADMIN — HEPARIN SODIUM 5000 UNIT(S): 5000 INJECTION INTRAVENOUS; SUBCUTANEOUS at 21:48

## 2023-06-23 RX ADMIN — MIDAZOLAM HYDROCHLORIDE 2 MILLIGRAM(S): 1 INJECTION, SOLUTION INTRAMUSCULAR; INTRAVENOUS at 13:34

## 2023-06-23 RX ADMIN — PIPERACILLIN AND TAZOBACTAM 25 GRAM(S): 4; .5 INJECTION, POWDER, LYOPHILIZED, FOR SOLUTION INTRAVENOUS at 09:25

## 2023-06-23 RX ADMIN — ONDANSETRON 4 MILLIGRAM(S): 8 TABLET, FILM COATED ORAL at 20:12

## 2023-06-23 RX ADMIN — PANTOPRAZOLE SODIUM 40 MILLIGRAM(S): 20 TABLET, DELAYED RELEASE ORAL at 06:34

## 2023-06-23 RX ADMIN — HEPARIN SODIUM 5000 UNIT(S): 5000 INJECTION INTRAVENOUS; SUBCUTANEOUS at 05:34

## 2023-06-23 RX ADMIN — Medication 667 MILLIGRAM(S): at 16:42

## 2023-06-23 RX ADMIN — HEPARIN SODIUM 5000 UNIT(S): 5000 INJECTION INTRAVENOUS; SUBCUTANEOUS at 16:41

## 2023-06-23 RX ADMIN — SODIUM CHLORIDE 75 MILLILITER(S): 9 INJECTION, SOLUTION INTRAVENOUS at 09:28

## 2023-06-23 RX ADMIN — CHLORHEXIDINE GLUCONATE 1 APPLICATION(S): 213 SOLUTION TOPICAL at 10:56

## 2023-06-23 RX ADMIN — DOPAMINE HYDROCHLORIDE 3.41 MICROGRAM(S)/KG/MIN: 40 INJECTION, SOLUTION, CONCENTRATE INTRAVENOUS at 21:48

## 2023-06-23 RX ADMIN — DOPAMINE HYDROCHLORIDE 3.41 MICROGRAM(S)/KG/MIN: 40 INJECTION, SOLUTION, CONCENTRATE INTRAVENOUS at 11:19

## 2023-06-23 RX ADMIN — Medication 60 MILLIGRAM(S): at 05:34

## 2023-06-23 RX ADMIN — PIPERACILLIN AND TAZOBACTAM 25 GRAM(S): 4; .5 INJECTION, POWDER, LYOPHILIZED, FOR SOLUTION INTRAVENOUS at 21:48

## 2023-06-23 RX ADMIN — Medication 4.26 MICROGRAM(S)/KG/MIN: at 09:28

## 2023-06-23 NOTE — PROGRESS NOTE ADULT - SUBJECTIVE AND OBJECTIVE BOX
Patient is a 68M with a PMH of ESRD on HD MWF, functional quadriplegia (R sided BKA + L sided AKA,) blindness, CAD, HTN, HLD, on 2L home O2 who presents to the ED for AMS.  Patient currently AAOx1, unable to provide history.  Per ED staff, patient was sent to the ED from his HD center earlier today.  Patient reportedly became confused and combative with staff - who were unable to start HD.  ED also reached out to family members who stated that the patient currently lives in a rehab center.  Was noted to be restless yesterday evening.  Vitals stable, labs show elevated troponin levels.  Will admit to tele.     (21 Jun 2023 21:07)      24 hr events:      ## ROS:  [ ] unable to obtain  CONSTITUTIONAL: No fever, weight loss, or fatigue  EYES: No eye pain, visual disturbances, or discharge  ENMT:  No difficulty hearing, tinnitus, vertigo; No sinus or throat pain  NECK: No pain or stiffness  RESPIRATORY: No cough, wheezing, chills or hemoptysis; No shortness of breath  CARDIOVASCULAR: No chest pain, palpitations, dizziness, or leg swelling  GASTROINTESTINAL: No abdominal or epigastric pain. No nausea, vomiting, or hematemesis; No diarrhea or constipation. No melena or hematochezia.  GENITOURINARY: No dysuria, frequency, hematuria, or incontinence  NEUROLOGICAL: No headaches, memory loss, loss of strength, numbness, or tremors  SKIN: No itching, burning, rashes, or lesions   LYMPH NODES: No enlarged glands  ENDOCRINE: No heat or cold intolerance; No hair loss  MUSCULOSKELETAL: No joint pain or swelling; No muscle, back, or extremity pain  PSYCHIATRIC: No depression, anxiety, mood swings, or difficulty sleeping  HEME/LYMPH: No easy bruising, or bleeding gums  ALLERGY AND IMMUNOLOGIC: No hives or eczema    ## Labs:  CBC:                        8.1    5.86  )-----------( 186      ( 23 Jun 2023 10:25 )             27.0     Chem:  06-23    135  |  100  |  61<H>  ----------------------------<  194<H>  5.0   |  31  |  6.42<H>    Ca    8.9      23 Jun 2023 11:51  Phos  1.5     06-23  Mg     4.1     06-23    TPro  7.1  /  Alb  2.7<L>  /  TBili  0.5  /  DBili  x   /  AST  19  /  ALT  27  /  AlkPhos  84  06-23    Coags:  PT/INR - ( 23 Jun 2023 10:25 )   PT: 14.1 sec;   INR: 1.17 ratio         PTT - ( 23 Jun 2023 10:25 )  PTT:27.4 sec        ## Imaging:    ## Medications:  piperacillin/tazobactam IVPB.. 3.375 Gram(s) IV Intermittent every 12 hours    DOPamine Infusion 2 MICROgram(s)/kG/Min IV Continuous <Continuous>      atorvastatin 40 milliGRAM(s) Oral at bedtime    aspirin enteric coated 81 milliGRAM(s) Oral daily  heparin   Injectable 5000 Unit(s) SubCutaneous every 8 hours    pantoprazole  Injectable 40 milliGRAM(s) IV Push daily    ondansetron Injectable 4 milliGRAM(s) IV Push every 8 hours PRN      ## Vitals:  T(C): 35.5 (06-23-23 @ 19:30), Max: 37.3 (06-22-23 @ 23:50)  HR: 71 (06-23-23 @ 21:30) (40 - 73)  BP: 136/54 (06-23-23 @ 21:30) (82/45 - 153/73)  BP(mean): 77 (06-23-23 @ 21:30) (62 - 89)  RR: 23 (06-23-23 @ 21:30) (12 - 24)  SpO2: 100% (06-23-23 @ 21:30) (72% - 100%)  Wt(kg): --  Vent:   ABG: ABG - ( 23 Jun 2023 08:37 )  pH, Arterial: 7.43  pH, Blood: x     /  pCO2: 49    /  pO2: 62    / HCO3: 32    / Base Excess: 7.3   /  SaO2: 91.5                  06-23 @ 07:01  -  06-23 @ 22:10  --------------------------------------------------------  IN: 214.4 mL / OUT: 0 mL / NET: 214.4 mL          ## P/E:  Gen: lying comfortably in bed in no apparent distress  HEENT: PERRL, EOMI  Resp: CTA B/L no c/r/w  CVS: S1S2 no m/r/g  Abd: soft NT/ND +BS  Ext: no c/c/e  Neuro: A&Ox3    CENTRAL LINE: [ ] YES [ ] NO  LOCATION:   DATE INSERTED:  REMOVE: [ ] YES [ ] NO      DUPONT: [ ] YES [ ] NO    DATE INSERTED:  REMOVE:  [ ] YES [ ] NO      A-LINE:  [ ] YES [ ] NO  LOCATION:   DATE INSERTED:  REMOVE:  [ ] YES [ ] NO  EXPLAIN:    GLOBAL ISSUE/BEST PRACTICE:  Analgesia:  Sedation:  HOB elevation: yes  Stress ulcer prophylaxis:  VTE prophylaxis:  Oral Care:  Glycemic control:  Nutrition:    CODE STATUS: [ ] full code  [ ] DNR  [ ] DNI  [ ] MOLST  Goals of care discussion: [ ] yes  Patient is a 68M with a PMH of ESRD on HD MWF, functional quadriplegia (R sided BKA + L sided AKA,) blindness, CAD, HTN, HLD, on 2L home O2 who presents to the ED for AMS.  Patient currently AAOx1, unable to provide history.  Per ED staff, patient was sent to the ED from his HD center earlier today.  Patient reportedly became confused and combative with staff - who were unable to start HD.  ED also reached out to family members who stated that the patient currently lives in a rehab center.  Was noted to be restless yesterday evening.  Vitals stable, labs show elevated troponin levels.  Will admit to tele.     (21 Jun 2023 21:07)      24 hr events: Pt was RRT today for hypotension with SBP in 80's and bradycardia with HR in 40's with vomiting episode. Junctional rhythm noted and pt started initially on norepi gtt which has been transitioned to dopamine gtt. Large left pleural effusion noted with 1.5 liters removed via thoracentesis.       ## ROS: Pt reports occasional nausea / vomiting, ROS otherwise negative, states he is "feeling better."        ## Labs:  CBC:                        8.1    5.86  )-----------( 186      ( 23 Jun 2023 10:25 )             27.0     Chem:  06-23    135  |  100  |  61<H>  ----------------------------<  194<H>  5.0   |  31  |  6.42<H>    Ca    8.9      23 Jun 2023 11:51  Phos  1.5     06-23  Mg     4.1     06-23    TPro  7.1  /  Alb  2.7<L>  /  TBili  0.5  /  DBili  x   /  AST  19  /  ALT  27  /  AlkPhos  84  06-23    Coags:  PT/INR - ( 23 Jun 2023 10:25 )   PT: 14.1 sec;   INR: 1.17 ratio    PTT - ( 23 Jun 2023 10:25 )  PTT:27.4 sec      ## Imaging:  Xray Chest 1 View- PORTABLE-Urgent (Xray Chest 1 View- PORTABLE-Urgent .) (06.23.23 @ 14:59) >  IMPRESSION: Markedly diminished left effusion after thoracentesis.      ## Medications:  piperacillin/tazobactam IVPB.. 3.375 Gram(s) IV Intermittent every 12 hours  DOPamine Infusion 2 MICROgram(s)/kG/Min IV Continuous <Continuous>  atorvastatin 40 milliGRAM(s) Oral at bedtime  aspirin enteric coated 81 milliGRAM(s) Oral daily  heparin   Injectable 5000 Unit(s) SubCutaneous every 8 hours  pantoprazole  Injectable 40 milliGRAM(s) IV Push daily  ondansetron Injectable 4 milliGRAM(s) IV Push every 8 hours PRN      ## Vitals:  T(C): 35.5 (06-23-23 @ 19:30), Max: 37.3 (06-22-23 @ 23:50)  HR: 71 (06-23-23 @ 21:30) (40 - 73)  BP: 136/54 (06-23-23 @ 21:30) (82/45 - 153/73)  BP(mean): 77 (06-23-23 @ 21:30) (62 - 89)  RR: 23 (06-23-23 @ 21:30) (12 - 24)  SpO2: 100% (06-23-23 @ 21:30) (72% - 100%)  ABG: ABG - ( 23 Jun 2023 08:37 )  pH, Arterial: 7.43  pH, Blood: x     /  pCO2: 49    /  pO2: 62    / HCO3: 32    / Base Excess: 7.3   /  SaO2: 91.5          06-23 @ 07:01  -  06-23 @ 22:10  --------------------------------------------------------  IN: 214.4 mL / OUT: 0 mL / NET: 214.4 mL        ## P/E:  Gen: lying comfortably in bed in no apparent distress  HEENT: PERRL, EOMI  Resp: CTA B/L no c/r/w  CVS: S1S2 no m/r/g  Abd: soft NT/ND +BS  Ext: no c/c/e  Neuro: A&Ox3    CENTRAL LINE: [ ] YES [ ] NO  LOCATION:   DATE INSERTED:  REMOVE: [ ] YES [ ] NO      DUPONT: [ ] YES [ ] NO    DATE INSERTED:  REMOVE:  [ ] YES [ ] NO      A-LINE:  [ ] YES [ ] NO  LOCATION:   DATE INSERTED:  REMOVE:  [ ] YES [ ] NO  EXPLAIN:    GLOBAL ISSUE/BEST PRACTICE:  Analgesia:  Sedation:  HOB elevation: yes  Stress ulcer prophylaxis:  VTE prophylaxis:  Oral Care:  Glycemic control:  Nutrition:    CODE STATUS: [ ] full code  [ ] DNR  [ ] DNI  [ ] MOLST  Goals of care discussion: [ ] yes  Patient is a 68M with a PMH of ESRD on HD MWF, functional quadriplegia (R sided BKA + L sided AKA,) blindness, CAD, HTN, HLD, on 2L home O2 who presents to the ED for AMS.  Patient currently AAOx1, unable to provide history.  Per ED staff, patient was sent to the ED from his HD center earlier today.  Patient reportedly became confused and combative with staff - who were unable to start HD.  ED also reached out to family members who stated that the patient currently lives in a rehab center.  Was noted to be restless yesterday evening.  Vitals stable, labs show elevated troponin levels.  Will admit to tele.     (21 Jun 2023 21:07)      24 hr events: Pt was RRT today for hypotension and bradycardia with HR in 40's with vomiting episode. Junctional rhythm noted and pt started initially on norepi gtt which has been transitioned to dopamine gtt. Large left pleural effusion noted with 1.5 liters removed via thoracentesis.       ## ROS: Pt reports occasional nausea / vomiting, ROS otherwise negative, states he is "feeling better."        ## Labs:  CBC:                        8.1    5.86  )-----------( 186      ( 23 Jun 2023 10:25 )             27.0     Chem:  06-23    135  |  100  |  61<H>  ----------------------------<  194<H>  5.0   |  31  |  6.42<H>    Ca    8.9      23 Jun 2023 11:51  Phos  1.5     06-23  Mg     4.1     06-23    TPro  7.1  /  Alb  2.7<L>  /  TBili  0.5  /  DBili  x   /  AST  19  /  ALT  27  /  AlkPhos  84  06-23    Coags:  PT/INR - ( 23 Jun 2023 10:25 )   PT: 14.1 sec;   INR: 1.17 ratio    PTT - ( 23 Jun 2023 10:25 )  PTT:27.4 sec      ## Imaging:  Xray Chest 1 View- PORTABLE-Urgent (Xray Chest 1 View- PORTABLE-Urgent .) (06.23.23 @ 14:59) >  IMPRESSION: Markedly diminished left effusion after thoracentesis.      ## Medications:  piperacillin/tazobactam IVPB.. 3.375 Gram(s) IV Intermittent every 12 hours  DOPamine Infusion 2 MICROgram(s)/kG/Min IV Continuous <Continuous>  atorvastatin 40 milliGRAM(s) Oral at bedtime  aspirin enteric coated 81 milliGRAM(s) Oral daily  heparin   Injectable 5000 Unit(s) SubCutaneous every 8 hours  pantoprazole  Injectable 40 milliGRAM(s) IV Push daily  ondansetron Injectable 4 milliGRAM(s) IV Push every 8 hours PRN      ## Vitals:  T(C): 35.5 (06-23-23 @ 19:30), Max: 37.3 (06-22-23 @ 23:50)  HR: 71 (06-23-23 @ 21:30) (40 - 73)  BP: 136/54 (06-23-23 @ 21:30) (82/45 - 153/73)  BP(mean): 77 (06-23-23 @ 21:30) (62 - 89)  RR: 23 (06-23-23 @ 21:30) (12 - 24)  SpO2: 100% (06-23-23 @ 21:30) (72% - 100%)  ABG: ABG - ( 23 Jun 2023 08:37 )  pH, Arterial: 7.43  pH, Blood: x     /  pCO2: 49    /  pO2: 62    / HCO3: 32    / Base Excess: 7.3   /  SaO2: 91.5          06-23 @ 07:01  -  06-23 @ 22:10  --------------------------------------------------------  IN: 214.4 mL / OUT: 0 mL / NET: 214.4 mL        ## P/E:  Gen: lying comfortably in bed in no apparent distress, lethargic but arousable and protecting his airway  HEENT: reactive equal pupils b/l  Resp: diminished b/s noted to b/l lower lobes, normal WOB  CVS: S1S2 noted  Abd: soft NT/ND +BS  Ext: no edema noted; b/l amputee   Neuro: A&Ox1-2, follows commands        CODE STATUS: [ x ] full code Patient is a 68M with a PMH of ESRD on HD MWF, functional quadriplegia (R sided BKA + L sided AKA,) blindness, CAD, HTN, HLD, on 2L home O2 who presents to the ED for AMS.  Patient currently AAOx1, unable to provide history.  Per ED staff, patient was sent to the ED from his HD center earlier today.  Patient reportedly became confused and combative with staff - who were unable to start HD.  ED also reached out to family members who stated that the patient currently lives in a rehab center.  Was noted to be restless yesterday evening.  Vitals stable, labs show elevated troponin levels.  Will admit to tele.     (21 Jun 2023 21:07)      24 hr events: Pt was RRT today for hypotension and bradycardia with HR in 40's with vomiting episode. Junctional rhythm noted and pt started initially on norepi gtt which has been transitioned to dopamine gtt. Large left pleural effusion noted with 1.5 liters removed via thoracentesis.       ## ROS: Pt reports occasional nausea / vomiting, ROS otherwise negative, states he is "feeling better."        ## Labs:  CBC:                        8.1    5.86  )-----------( 186      ( 23 Jun 2023 10:25 )             27.0     Chem:  06-23    135  |  100  |  61<H>  ----------------------------<  194<H>  5.0   |  31  |  6.42<H>    Ca    8.9      23 Jun 2023 11:51  Phos  1.5     06-23  Mg     4.1     06-23    TPro  7.1  /  Alb  2.7<L>  /  TBili  0.5  /  DBili  x   /  AST  19  /  ALT  27  /  AlkPhos  84  06-23    Coags:  PT/INR - ( 23 Jun 2023 10:25 )   PT: 14.1 sec;   INR: 1.17 ratio    PTT - ( 23 Jun 2023 10:25 )  PTT:27.4 sec      ## Imaging:  Xray Chest 1 View- PORTABLE-Urgent (Xray Chest 1 View- PORTABLE-Urgent .) (06.23.23 @ 14:59) >  IMPRESSION: Markedly diminished left effusion after thoracentesis.    TTE Echo Complete w/o Contrast w/ Doppler (06.23.23 @ 10:32)   Summary:   1. Mildly decreased global left ventricular systolic function.   2. Left ventricular ejection fraction, by visual estimation, is 40 to   45%.   3. Spectral Doppler shows restrictive pattern of left ventricular   myocardial filling (Grade III diastolic dysfunction).   4. Mild to moderately enlarged left atrium.   5. Structurally normal mitral valve, with normal leaflet excursion.   6. Mild mitral valve regurgitation.   7. Mild aortic valve stenosis.   8. Mildly reduced RV systolic function.   9. Structurally normal tricuspid valve, with normal leaflet excursion.  10. Moderate tricuspid regurgitation.  11. Structurally normal pulmonic valve, with normal leaflet excursion.  12. Estimated pulmonary artery systolic pressure is 56.1 mmHg assuming a   right atrial pressure of 5 mmHg, which is consistent with moderate   pulmonary hypertension.  13. Large pleural effusion in the left lateral region.        ## Medications:  piperacillin/tazobactam IVPB.. 3.375 Gram(s) IV Intermittent every 12 hours  DOPamine Infusion 2 MICROgram(s)/kG/Min IV Continuous <Continuous>  atorvastatin 40 milliGRAM(s) Oral at bedtime  aspirin enteric coated 81 milliGRAM(s) Oral daily  heparin   Injectable 5000 Unit(s) SubCutaneous every 8 hours  pantoprazole  Injectable 40 milliGRAM(s) IV Push daily  ondansetron Injectable 4 milliGRAM(s) IV Push every 8 hours PRN      ## Vitals:  T(C): 35.5 (06-23-23 @ 19:30), Max: 37.3 (06-22-23 @ 23:50)  HR: 71 (06-23-23 @ 21:30) (40 - 73)  BP: 136/54 (06-23-23 @ 21:30) (82/45 - 153/73)  BP(mean): 77 (06-23-23 @ 21:30) (62 - 89)  RR: 23 (06-23-23 @ 21:30) (12 - 24)  SpO2: 100% (06-23-23 @ 21:30) (72% - 100%)  ABG: ABG - ( 23 Jun 2023 08:37 )  pH, Arterial: 7.43  pH, Blood: x     /  pCO2: 49    /  pO2: 62    / HCO3: 32    / Base Excess: 7.3   /  SaO2: 91.5          06-23 @ 07:01  -  06-23 @ 22:10  --------------------------------------------------------  IN: 214.4 mL / OUT: 0 mL / NET: 214.4 mL        ## P/E:  Gen: lying comfortably in bed in no apparent distress, lethargic but arousable and protecting his airway  HEENT: reactive equal pupils b/l  Resp: diminished b/s noted to b/l lower lobes, normal WOB  CVS: S1S2 noted  Abd: soft NT/ND +BS  Ext: no edema noted; b/l amputee   Neuro: A&Ox1-2, follows commands        CODE STATUS: [ x ] full code

## 2023-06-23 NOTE — PHYSICAL THERAPY INITIAL EVALUATION ADULT - MODALITIES TREATMENT COMMENTS
Sacral stage II pressure injury 7.5 x 10 x .1 cm, no drainage / odor, mild maceration noted at wound edge Sacral stage II pressure injury 7.5 x 10 x .1 cm, no drainage / odor, scant sero-sanguinous exudate, mild maceration noted at wound edge

## 2023-06-23 NOTE — PROGRESS NOTE ADULT - SUBJECTIVE AND OBJECTIVE BOX
JAMES PATRICK  68y  Patient is a 68y old  Male who presents with a chief complaint of AMS (23 Jun 2023 08:57)    HPI:  Seen and examined this am.  Transferred to CCU for Bradycardia and Hypotension.  Treated for AMS, possible CVA, sepsis.  HEALTH ISSUES - PROBLEM Dx:  Metabolic encephalopathy    ESRD on dialysis    Essential hypertension    Hyperlipidemia    CAD (coronary artery disease)    Troponin level elevated          MEDICATIONS  (STANDING):  aspirin enteric coated 81 milliGRAM(s) Oral daily  atorvastatin 40 milliGRAM(s) Oral at bedtime  calcium acetate 667 milliGRAM(s) Oral three times a day with meals  chlorhexidine 2% Cloths 1 Application(s) Topical daily  heparin   Injectable 5000 Unit(s) SubCutaneous every 8 hours  norepinephrine Infusion 0.05 MICROgram(s)/kG/Min (4.26 mL/Hr) IV Continuous <Continuous>  pantoprazole  Injectable 40 milliGRAM(s) IV Push daily  piperacillin/tazobactam IVPB.. 3.375 Gram(s) IV Intermittent every 12 hours    MEDICATIONS  (PRN):  ondansetron Injectable 4 milliGRAM(s) IV Push every 8 hours PRN Nausea and/or Vomiting    Vital Signs Last 24 Hrs  T(C): 37.1 (23 Jun 2023 08:35), Max: 37.3 (22 Jun 2023 23:50)  T(F): 98.7 (23 Jun 2023 08:35), Max: 99.1 (22 Jun 2023 23:50)  HR: 42 (23 Jun 2023 08:35) (42 - 67)  BP: 82/45 (23 Jun 2023 08:35) (82/45 - 155/77)  BP(mean): --  RR: 17 (23 Jun 2023 07:56) (16 - 18)  SpO2: 96% (23 Jun 2023 07:56) (92% - 100%)    Parameters below as of 23 Jun 2023 07:56  Patient On (Oxygen Delivery Method): nasal cannula  O2 Flow (L/min): 2    Daily Height in cm: 167.64 (23 Jun 2023 08:34)    Daily     PHYSICAL EXAM:  Constitutional:  He appears comfortable and not distressed. Not diaphoretic.    Neck:  The thyroid is normal. Trachea is midline.     Breasts: Normal examination.    Respiratory: The lungs are clear to auscultation. No dullness and expansion is normal.    Cardiovascular: S1 and S2 are normal. No murmurs, rubs or gallops are present.    Gastrointestinal: The abdomen is soft. No tenderness is present. No masses are present. Bowel sounds are normal.    Genitourinary: The bladder is not distended. No CVA tenderness is present.    Extremities: No edema is noted. Bilateral amputations.    Neurological: Awake and orient x 1. Tone, power and sensation are normal.     Skin: No lesions are seen  or palpated.    Lymph Nodes: No lymphadenopathy is present.                                8.8    5.56  )-----------( 159      ( 22 Jun 2023 07:15 )             28.2     06-22    136  |  98  |  49<H>  ----------------------------<  94  4.4   |  34<H>  |  5.02<H>    pH, Arterial: 7.43  pCO2, Arterial: 49 mmHg  pO2, Arterial: 62 mmHg  HCO3, Arterial: 32 mmol/L  Base Excess, Arterial: 7.3 mmol/L  Oxygen Saturation, Arterial: 91.5 %  Total CO2, Arterial: 34 mmol/L  FIO2, Arterial: 28.0  Blood Gas Comments Arterial: 2lpm NC. ABG obtained by BECKI Castellanos.  Blood Gas Source Arterial:   Arteria lBlood Gas Arterial - Potassium: 5.1 mmol/L (06.23.23 @ 08:37)   Blood Gas Arterial, Lactate: 1.80 mmol/L (06.23.23 @ 08:37)       Ca    8.6      22 Jun 2023 07:15    TPro  7.3  /  Alb  2.7<L>  /  TBili  0.3  /  DBili  x   /  AST  25  /  ALT  33  /  AlkPhos  90  06-21    Urinalysis Basic - ( 22 Jun 2023 07:15 )    Color: x / Appearance: x / SG: x / pH: x  Gluc: 94 mg/dL / Ketone: x  / Bili: x / Urobili: x   Blood: x / Protein: x / Nitrite: x   Leuk Esterase: x / RBC: x / WBC x   Sq Epi: x / Non Sq Epi: x / Bacteria: x

## 2023-06-23 NOTE — PHYSICAL THERAPY INITIAL EVALUATION ADULT - ADDITIONAL COMMENTS
Pt is non verbal / lethargic at this time. PLOF obtained from H&P and pt's niece Ramonita Perezarez. Pt resides in a nursing home/[facility], [facility] dependent with all ADLs and mobility at [facility]. Requires 2 person assist for transfers bed <> W/C or uses la lift.

## 2023-06-23 NOTE — CONSULT NOTE ADULT - SUBJECTIVE AND OBJECTIVE BOX
JAMES PATRICK  [id number]    Date of Consult:  6/23/23  CHIEF COMPLAINT:  AMS  HISTORY OF PRESENT ILLNESS:  68M with ESRD on HD MWF, s/p R BKA, L  AKA blindness, CAD, HTN, HLD, on 2L home O2 who presents to the ED for AMS.  pt remains altered. all information obtained from chart. pt sent in from HD with confusion, agitation. being treated here for presumed UTI. pt initially normotensive in SR. today pt had a rapid response for junctional rhythm pt still in it current HR 45, narrow complex, occasional sinus beats. during rapid pt also found to be acutely hypotensive to 70s systolic, pt transferred to ICU, levo started  currently pt is awake, alert, a and o x0. does not appear to be in distress. HR 40 on levo. map 62 .   no evidence of melena, abd soft non tender. hgb 8.8. on last admission in 2021 hgb 10s.       Allergies    No Known Allergies    Intolerances    	    MEDICATIONS:  aspirin enteric coated 81 milliGRAM(s) Oral daily  heparin   Injectable 5000 Unit(s) SubCutaneous every 8 hours  norepinephrine Infusion 0.05 MICROgram(s)/kG/Min IV Continuous <Continuous>    piperacillin/tazobactam IVPB.. 3.375 Gram(s) IV Intermittent every 12 hours      ondansetron Injectable 4 milliGRAM(s) IV Push every 8 hours PRN    pantoprazole  Injectable 40 milliGRAM(s) IV Push daily    atorvastatin 40 milliGRAM(s) Oral at bedtime    calcium acetate 667 milliGRAM(s) Oral three times a day with meals  chlorhexidine 2% Cloths 1 Application(s) Topical daily      PAST MEDICAL & SURGICAL HISTORY:  DM (diabetes mellitus)      HTN (hypertension)      Below knee amputation status, right      CKD (chronic kidney disease)      MRSA (methicillin resistant Staphylococcus aureus) colonization  (hx/o MRSA growth from wound culture)      Wound  (chronic wounds to left foot and leg)      DM (diabetes mellitus)      HTN (hypertension)      Kidney disease      S/P BKA (below knee amputation) unilateral, right      H/O peripheral artery bypass  left fem to below-knee pop with RSVG      Amputated left leg  above knee      Amputated right leg  below knee          FAMILY HISTORY:  Family history of diabetes mellitus        SOCIAL HISTORY:    unable to assess      REVIEW OF SYSTEMS:  See HPI. Otherwise, 10 point ROS done and otherwise negative.    PHYSICAL EXAM:  T(C): 37.1 (06-23-23 @ 08:35), Max: 37.3 (06-22-23 @ 23:50)  HR: 42 (06-23-23 @ 08:35) (42 - 67)  BP: 82/45 (06-23-23 @ 08:35) (82/45 - 155/77)  RR: 17 (06-23-23 @ 07:56) (16 - 18)  SpO2: 96% (06-23-23 @ 07:56) (92% - 100%)  Wt(kg): --  I&O's Summary      Appearance: Normal	  HEENT:   Normal oral mucosa, PERRL, EOMI	  Lymphatic: No lymphadenopathy  Cardiovascular: Normal S1 S2, No JVD, No murmurs  Respiratory: coarse bs b/l	  Psychiatry:  Mood & affect appropriate  Gastrointestinal:  Soft, Non-tender, + BS	  Skin: No rashes, No ecchymoses, No cyanosis	  Neurologic: unable to assess  Extremities: Normal range of motion, No clubbing, cyanosis       LABS:	 	    CBC Full  -  ( 22 Jun 2023 07:15 )  WBC Count : 5.56 K/uL  Hemoglobin : 8.8 g/dL  Hematocrit : 28.2 %  Platelet Count - Automated : 159 K/uL  Mean Cell Volume : 93.4 fl  Mean Cell Hemoglobin : 29.1 pg  Mean Cell Hemoglobin Concentration : 31.2 g/dL  Auto Neutrophil # : x  Auto Lymphocyte # : x  Auto Monocyte # : x  Auto Eosinophil # : x  Auto Basophil # : x  Auto Neutrophil % : x  Auto Lymphocyte % : x  Auto Monocyte % : x  Auto Eosinophil % : x  Auto Basophil % : x    06-22    136  |  98  |  49<H>  ----------------------------<  94  4.4   |  34<H>  |  5.02<H>  06-21    136  |  98  |  45<H>  ----------------------------<  109<H>  4.3   |  35<H>  |  4.47<H>    Ca    8.6      22 Jun 2023 07:15  Ca    8.6      21 Jun 2023 16:13    TPro  7.3  /  Alb  2.7<L>  /  TBili  0.3  /  DBili  x   /  AST  25  /  ALT  33  /  AlkPhos  90  06-21      proBNP:   Lipid Profile:   HgA1c:   TSH:       CARDIAC MARKERS:            TELEMETRY: 	    ECG:  	  RADIOLOGY:  OTHER: 	    PREVIOUS DIAGNOSTIC TESTING:    [ ] Echocardiogram:< from: TTE with Doppler (w/Cont) (02.23.21 @ 08:49) >  OBSERVATIONS:  Mitral Valve: Mitral annular calcification, otherwise  normal mitral valve. Minimal mitral regurgitation.  Aortic Root: Normal aortic root.  Aortic Valve: Aortic valve leaflet morphology not well  visualized.   Peak transaortic valve gradient equals 23 mm Hg, mean  transaortic valve gradient equals 13 mm Hg, consistent with  mild aortic stenosis.  Left Atrium: Moderately dilated left atrium.  LA volume  index = 42 cc/m2.  Left Ventricle: Endocardium not well visualized; grossly  normal left ventricular systolic function. Moderate  concentric left ventricular hypertrophy. Moderate diastolic  dysfunction (Stage II).  Right Heart: Normal right atrium. The right ventricle is  not well visualized; grossly normal right ventricular  systolic function. Normal tricuspid valve. Mild tricuspid  regurgitation. Normal pulmonic valve. Minimal pulmonic  regurgitation.  Pericardium/PleuraNormal pericardium with no pericardial  effusion.    < end of copied text >    [ ]  Catheterization:  [ ] Stress Test:  	  	  ASSESSMENT/PLAN: 	    Jerrod Horta MD

## 2023-06-23 NOTE — DIETITIAN INITIAL EVALUATION ADULT - NS FNS DIET ORDER
Diet, NPO:   Except Medications     Special Instructions for Nursing:  Except Medications (06-23-23 @ 08:42)

## 2023-06-23 NOTE — DIETITIAN INITIAL EVALUATION ADULT - SIGNS/SYMPTOMS
NPO x 1 day, <50% PO intake while on solid food (1 day) + pt c ESRD on HD requiring inc. protein NPO x 1 day, <50% PO intake (1 day), pt c ESRD on HD requiring inc. protein + pressure ulcer NPO x 1 day, <50% PO intake (1 day), pt c ESRD on HD requiring inc. protein, pressure ulcer, AMS

## 2023-06-23 NOTE — PROCEDURE NOTE - NSPROCDETAILS_GEN_ALL_CORE
blood seen on insertion/dressing applied/flushes easily/secured in place
location identified, draped/prepped, sterile technique used, needle inserted/introduced/Seldinger technique/catheter inserted over needle/connection to syringe/ultrasound assessment of effusion (localization)
positive blood return obtained via catheter

## 2023-06-23 NOTE — DIETITIAN INITIAL EVALUATION ADULT - OTHER INFO
Unable to interview pt at this time due to AMS. Per medical record pt resides at SNF; no information in paper chart regarding diet there; pt presented from outpatient dialysis center not directly from residential facility. Prior to pt being transferred to CCU this morning (RRT for hypotension) SLP recommended Easy to Chew consistency c mildly-thick liquids per swallow evaluation on 6/22). Pt c AKA & BKA; height obtained from previous RD assessments in 2018 & 2019; IBW & BMI adjusted. No reports of any N/V/C/D.

## 2023-06-23 NOTE — PROGRESS NOTE ADULT - ASSESSMENT
ESRD:  On HD at [facility].  - HD held for now due to hypotension.  - Reschedule HD once she is more stable.  - Renal diet.    Hypertension:  - Start ARB.  - Follow up BM.    Anemia  - TAINA at HD.    Acute Mental Status Change:  CVA vs Sepsis.  - Care as per Medicine.

## 2023-06-23 NOTE — DIETITIAN INITIAL EVALUATION ADULT - ADD RECOMMEND
Once diet is advanced recommend Renal diet; Easy to Chew consistency c mildly-thick liquids; possible CCHO restriction depending on A1c

## 2023-06-23 NOTE — PHYSICAL THERAPY INITIAL EVALUATION ADULT - PERTINENT HX OF CURRENT PROBLEM, REHAB EVAL
68M with a PMH of ESRD on HD MWF, functional quadriplegia (R sided BKA + L sided AKA,) blindness, CAD, HTN, HLD, CVA in past, on 2L home O2 who was admitted for AMS. Transferred to ICU post RR for hypotension and bradycardia. PT consulted for Stage II pressure injury to sacrum.

## 2023-06-23 NOTE — RAPID RESPONSE TEAM SUMMARY - NSOTHERINTERVENTIONSRRT_GEN_ALL_CORE
Pt was given 500ml IVF but as he is an HD patient, will not give more. ICU was called and the patient was accepted to their service. Dr. Leiva has been notified.     Critical Care Time: 42 min

## 2023-06-23 NOTE — DIETITIAN INITIAL EVALUATION ADULT - ETIOLOGY
Decreased ability to consume sufficient energy Decreased ability to consume sufficient energy & protein

## 2023-06-23 NOTE — DIETITIAN INITIAL EVALUATION ADULT - PERTINENT LABORATORY DATA
06-22    136  |  98  |  49<H>  ----------------------------<  94  4.4   |  34<H>  |  5.02<H>    Ca    8.6      22 Jun 2023 07:15    TPro  7.3  /  Alb  2.7<L>  /  TBili  0.3  /  DBili  x   /  AST  25  /  ALT  33  /  AlkPhos  90  06-21  POCT Blood Glucose.: 134 mg/dL (06-23-23)  A1c lab pending

## 2023-06-23 NOTE — RAPID RESPONSE TEAM SUMMARY - NSSITUATIONBACKGROUNDRRT_GEN_ALL_CORE
68M with a PMH of ESRD on HD MWF, functional quadriplegia (R sided BKA + L sided AKA,) blindness, CAD, HTN, HLD, on 2L home O2 who was admitted for AMS. RRT was called after the patient became hypotensive w/ SBP in 80s and HR in 40s. Pt was clammy and vomited. Prior to the RRT he had received his scheduled Nifedpaine as his SBP usually runs in 130s-140s.

## 2023-06-23 NOTE — PHYSICAL THERAPY INITIAL EVALUATION ADULT - GENERAL OBSERVATIONS, REHAB EVAL
Pt found semi supine in bed in NAD, +IV, +tele, O2, R BKA, L AKA, RN Rafat aware. Pt lethargic / non verbal at this time.

## 2023-06-23 NOTE — CONSULT NOTE ADULT - ASSESSMENT
68M with ESRD on HD MWF, s/p R BKA, L  AKA blindness, CAD, HTN, HLD, on 2L home O2 who presents to the ED for AMS    pt initially being treated for presumed UTI  now with acute change of hypotension requiring pressor support and intermittent junctional rhythm  qrs remains narrow., HR in 40s, on levo  unclear etiology of decline  would consider r/o RP bleed, check fobt, to account for acute change in bp  agree with levo as sepsis high on differential  can consider dopamine to improve HR which may improve bp  hr may also improve as sepsis improves, will monitor  tte pending, will follow up.   cont supportive care per icu team  will follow with you

## 2023-06-23 NOTE — PROCEDURE NOTE - NSSITEPREP_SKIN_A_CORE
alcohol
chlorhexidine/Adherence to aseptic technique: hand hygiene prior to donning barriers (gown, gloves), don cap and mask, sterile drape over patient
chlorhexidine

## 2023-06-23 NOTE — CONSULT NOTE ADULT - CRITICAL CARE ATTENDING COMMENT
68M w/ ESRD on HD, functional quadriplegia (R sided BKA + L sided AKA,) blindness, CAD, HTN, HLD, CVA, on 2L home O2 who was admitted for AMS, being treated for UTI. RRT called for hypotension and HR to 40s. EKG showed slow junctional rhythm; pt received 500cc bolus, started on norepi and transferred to ICU.     POCUS:   A lines anteriorly on R, L with large pleural effusion likely partially loculated, simple appearing fluid  Moderately decreased EF, no pericardial effusion, plethoric IVC    #Neuro - reportedly AAOx1-2 at baseline, pt is able to answer some simple questions w/ an  however, continue to monitor  #CV - change in rhythm + hypotension, w/ some improvement on norepi, will add dopamine as recommended by cardiology; unclear what the cause of this change is, no electrolyte imbalance and hgb is stable; perhaps all sepsis induced; hold BP Meds; cardiology consulted and appreciate recs; titrate norepi and dopamine to MAP >65; TTE pending, check cardiac enzymes  #Pulm - satting well on 2L NC;  noted large L pleural effusion, plan for thoracentesis this afternoon  #ID- currently on zosyn for possible UTI, all cx NGTD; repeat blood cx today, MRSA PCR  #Renal/metabolic - ESRD on HD, hold HD today in setting of hypotension, d/c IVF  #GI- keep NPO with unclear mental status; change protonix to IV  #Heme - noted chronic anemia, stable  #Endo - FS q6 while NPO  #PPx - HSQ q8  #Dispo- transfer to ICU for shock state; prognosis guarded; full code; palliative consult    Plan of care discussed w/ pt's hayden Shipman

## 2023-06-23 NOTE — CONSULT NOTE ADULT - ASSESSMENT
Assessment:68M with a PMH of ESRD on HD MWF, functional quadriplegia (R sided BKA + L sided AKA,) blindness, CAD, HTN, HLD, CVA in past, on 2L home O2 who was admitted for AMS. Transfered to ICU post RR for hypotension and bradycardia.     Plan:   ##Neuro: admitted for AMS, unclear baseline mental status. CT with old ischemic events but no acute intracranial processes. Per chart note by hospitalist, neurology consulted- will f/u recs. f/u EEG.   ##Resp: Was on RA with no active issues, now currently on 2L NC post vomiting event. f/u CXR. Wean o2 as tolerates.   ##Cardiac: patient was hemodynamically stable on home antihypertensives with baseline BPs 130-140s, this AM recieved home nifedipine, was subsequently a RR for hypotension and bradycardia. EKG with no-p waves questionable slow afib vs juncitonal. Pacer pads on patient. Atropine at bedside. Levo gtt ordered for MAP >65. Now currently holding all antihypertensives. Troponin elevated upon admission, currently peaked. f/u official TTE.   ##GI: Passed s/s upon admission. Will now make NPO post rapid response and vomiting episode. Aspiration precautions.   ##Renal: ESRD. Nephrology following. Plan for HD today. 500 ML of fluid given back during rapid. Will close monitor fluid status. Trend electrolyte abnormalities. Bladder scan q8hr.   ##ID: Tx for +UA with WBCs & leuk esterase, on zosyn. All cultures to date negative. During rapid recultured with added procal & lactate levels. Trend temperature and WBC curve closely.   ##Endo: FSq6hr while NPO. f/u a1c & tsh levels   ##Heme: subq heparin for DVT prophylaxis + aspirin. Anemia of chronic disease, will trend H&H.   ##Dispo: Full code. Transfer to the ICU for further management of care.     *Plan of care discussed with ICU attending, MD Roblero.   Assessment:68M with a PMH of ESRD on HD MWF, functional quadriplegia (R sided BKA + L sided AKA,) blindness, CAD, HTN, HLD, CVA in past, on 2L home O2 who was admitted for AMS. Transfered to ICU post RR for hypotension and bradycardia.     Plan:   ##Neuro: admitted for AMS, unclear baseline mental status. CT with old ischemic events but no acute intracranial processes. Per chart note by hospitalist, neurology consulted- will f/u recs. f/u EEG & MRI.   ##Resp: On 2L NC baseline @ home, no active issues. Patient post vomiting event. f/u CXR to r/u aspiration. Wean o2 as tolerates.   ##Cardiac: patient was hemodynamically stable on home antihypertensives with baseline BPs 130-140s, this AM recieved home nifedipine, was subsequently a RR for hypotension and bradycardia. EKG with no-p waves questionable slow afib vs juncitonal. Pacer pads on patient. Atropine at bedside. Levo gtt ordered for MAP >65. Now currently holding all antihypertensives. Troponin elevated upon admission, currently peaked. f/u official TTE.   ##GI: Passed s/s upon admission. Will now make NPO post rapid response and vomiting episode. Aspiration precautions.   ##Renal: ESRD. Nephrology following. Plan for HD today. 500 ML of fluid given back during rapid. Will close monitor fluid status. Trend electrolyte abnormalities. Bladder scan q8hr.   ##ID: Tx for +UA with WBCs & leuk esterase, on zosyn. All cultures to date negative. During rapid recultured with added procal & lactate levels. Trend temperature and WBC curve closely. Wound care consult.   ##Endo: FSq6hr while NPO. f/u a1c & tsh levels   ##Heme: subq heparin for DVT prophylaxis + aspirin. Anemia of chronic disease, will trend H&H.   ##Dispo: Full code. Transfer to the ICU for further management of care.     *Plan of care discussed with ICU attending, MD Roblero.   Assessment:68M with a PMH of ESRD on HD MWF, functional quadriplegia (R sided BKA + L sided AKA,) blindness, CAD, HTN, HLD, CVA in past, on 2L home O2 who was admitted for AMS. Transfered to ICU post RR for hypotension and bradycardia.     Plan:   ##Neuro: admitted for AMS, unclear baseline mental status. CT with old ischemic events but no acute intracranial processes. Per chart note by hospitalist, neurology consulted- will f/u recs. f/u EEG & MRI.   ##Resp: On 2L NC baseline @ home, no active issues. Patient post vomiting event. f/u CXR to r/u aspiration. Wean o2 as tolerates.   ##Cardiac: patient was hemodynamically stable on home antihypertensives with baseline BPs 130-140s, this AM recieved home nifedipine, was subsequently a RR for hypotension and bradycardia. EKG with no-p waves questionable slow afib vs juncitonal. Pacer pads on patient. Atropine at bedside. Levo gtt ordered for MAP >65. Now currently holding all antihypertensives. Troponin elevated upon admission, currently peaked. f/u official TTE.   ##GI: Passed s/s upon admission. Will now make NPO post rapid response and vomiting episode. Aspiration precautions.   ##Renal: ESRD. Nephrology following. Plan for HD today but given acute hypotension will hold off today and monitor closely per Renal. 500 ML of fluid given back during rapid. Will close monitor fluid status. Trend electrolyte abnormalities. Bladder scan q8hr.   ##ID: Tx for +UA with WBCs & leuk esterase, on zosyn. All cultures to date negative. During rapid recultured with added procal & lactate levels. Trend temperature and WBC curve closely. Wound care consult.   ##Endo: FSq6hr while NPO. f/u a1c & tsh levels   ##Heme: subq heparin for DVT prophylaxis + aspirin. Anemia of chronic disease, will trend H&H.   ##Dispo: Full code. Transfer to the ICU for further management of care.     *Plan of care discussed with ICU attending, MD Roblero.   Assessment:68M with a PMH of ESRD on HD MWF, functional quadriplegia (R sided BKA + L sided AKA,) blindness, CAD, HTN, HLD, CVA in past, on 2L home O2 who was admitted for AMS. Transfered to ICU post RR for hypotension and bradycardia.     Plan:   ##Neuro: admitted for AMS, unclear baseline mental status. CT with old ischemic events but no acute intracranial processes. Per chart note by hospitalist, neurology consulted- will f/u recs. f/u EEG & MRI.   ##Resp: On 2L NC baseline @ home, no active issues. Patient post vomiting event. f/u CXR to r/u aspiration. Wean o2 as tolerates.   ##Cardiac: patient was hemodynamically stable on home antihypertensives with baseline BPs 130-140s, this AM recieved home nifedipine, was subsequently a RR for hypotension and bradycardia. EKG with no-p waves questionable slow afib vs juncitonal. Pacer pads on patient. Atropine at bedside. Levo gtt ordered for MAP >65. Now currently holding all antihypertensives. Troponin elevated upon admission, currently peaked, will send new cardiac enzymes in setting of EKG changes. Will perform bedside POCUS upon arrival to ICU. f/u official TTE. Will consult Cardiology.    ##GI: Passed s/s upon admission. Will now make NPO post rapid response and vomiting episode. Aspiration precautions. PPI.   ##Renal: ESRD. Nephrology following. Plan for HD today but given acute hypotension will hold off today and monitor closely per Renal. 500 ML of fluid given back during rapid. Maintaince IVF d/c'd, will close monitor fluid status. Trend electrolyte abnormalities. Bladder scan q8hr.   ##ID: Tx for +UA with WBCs & leuk esterase, on zosyn will continue. All cultures to date negative. During rapid recultured with added procal, MRSA, & lactate levels. Trend temperature and WBC curve closely. Wound care consult.   ##Endo: FSq6hr while NPO. f/u a1c & tsh levels   ##Heme: subq heparin for DVT prophylaxis + aspirin. Anemia of chronic disease, will trend H&H.   ##Dispo: Full code. Transfer to the ICU for further management of care. Will attempt to contact family.     *Plan of care discussed with ICU attending, MD Roblero.   Assessment:68M with a PMH of ESRD on HD MWF, functional quadriplegia (R sided BKA + L sided AKA,) blindness, CAD, HTN, HLD, CVA in past, on 2L home O2 who was admitted for AMS. Transfered to ICU post RR for hypotension and bradycardia.     Plan:   ##Neuro: admitted for AMS, unclear baseline mental status. CT with old ischemic events but no acute intracranial processes. Per chart note by hospitalist, neurology consulted- will f/u recs. Avoid sedative agents given lethargy. f/u EEG & MRI.   ##Resp: On 2L NC baseline @ home, no active issues. Patient post vomiting event. f/u CXR to r/u aspiration. Wean o2 as tolerates.   ##Cardiac: patient was hemodynamically stable on home antihypertensives with baseline BPs 130-140s, this AM received home nifedipine, was subsequently a RR for hypotension and bradycardia. EKG with no-p waves questionable slow afib vs juncitonal. Pacer pads on patient. Atropine at bedside. Levo gtt ordered for MAP >65. Now currently holding all antihypertensives. Troponin elevated upon admission, currently peaked, will send new cardiac enzymes in setting of EKG changes. Will perform bedside POCUS upon arrival to ICU. f/u official TTE. Cardiology consulted, appreciate. Will consider Dopamine given new junctional rythmn.   ##GI: Passed s/s upon admission. Will now make NPO post rapid response and vomiting episode. Aspiration precautions. PPI.   ##Renal: ESRD. Nephrology following. Plan for HD today but given acute hypotension will hold off today and monitor closely per Renal. 500 ML of fluid given back during rapid. Maintaince IVF d/c'd, will close monitor fluid status. Trend electrolyte abnormalities. Bladder scan q8hr.   ##ID: Tx for +UA with WBCs & leuk esterase, on zosyn will continue. All cultures to date negative. During rapid recultured with added procal, MRSA, & lactate levels. Questonable if this is septic related? Will trend temperature and WBC curve closely. Wound care consult.   ##Endo: FSq6hr while NPO. f/u a1c & tsh levels   ##Heme: subq heparin for DVT prophylaxis + aspirin. Anemia of chronic disease, slight drop from previous admission will trend H&H & send type & screen.   ##Dispo: Full code. Transfer to the ICU for further management of care. Will attempt to contact family.     *Plan of care discussed with ICU attending, MD Roblero.   Assessment:68M with a PMH of ESRD on HD MWF, functional quadriplegia (R sided BKA + L sided AKA,) blindness, CAD, HTN, HLD, CVA in past, on 2L home O2 who was admitted for AMS. Transfered to ICU post RR for hypotension and bradycardia.     Plan:   ##Neuro: admitted for AMS, unclear baseline mental status. CT with old ischemic events but no acute intracranial processes. Per chart note by hospitalist, neurology consulted- will f/u recs. Avoid sedative agents given lethargy. f/u EEG & MRI.   ##Resp: On 2L NC baseline @ home, no active issues. Patient post vomiting event. Large left effusion on admitting CXR f/u  repeat CXR- will consider thoracentesis. Wean o2 as tolerates.   ##Cardiac: patient was hemodynamically stable on home antihypertensives with baseline BPs 130-140s, this AM received home nifedipine, was subsequently a RR for hypotension and bradycardia. EKG with no-p waves questionable slow afib vs juncitonal. Pacer pads on patient. Atropine at bedside. Levo gtt ordered for MAP >65. Now currently holding all antihypertensives. Troponin elevated upon admission, currently peaked, will send new cardiac enzymes in setting of EKG changes. Will perform bedside POCUS upon arrival to ICU. f/u official TTE. Cardiology consulted, appreciate. Will consider Dopamine given new junctional rythmn.   ##GI: Passed s/s upon admission. Will now make NPO post rapid response and vomiting episode. Aspiration precautions. PPI.   ##Renal: ESRD. Nephrology following. Plan for HD today but given acute hypotension will hold off today and monitor closely per Renal. 500 ML of fluid given back during rapid. Maintaince IVF d/c'd, will close monitor fluid status. Trend electrolyte abnormalities. Bladder scan q8hr.   ##ID: Tx for +UA with WBCs & leuk esterase, on zosyn will continue. All cultures to date negative. During rapid recultured with added procal, MRSA, & lactate levels. Questonable if this is septic related? Will trend temperature and WBC curve closely. Wound care consult.   ##Endo: FSq6hr while NPO. f/u a1c & tsh levels   ##Heme: subq heparin for DVT prophylaxis + aspirin. Anemia of chronic disease, slight drop from previous admission will trend H&H & send type & screen.   ##Dispo: Full code. Transfer to the ICU for further management of care. Will attempt to contact family.     *Plan of care discussed with ICU attending, MD Roblero.

## 2023-06-23 NOTE — PROCEDURE NOTE - NSPOSTCAREGUIDE_GEN_A_CORE
Care for catheter as per unit/ICU protocols
Care as per ICU/Verbal/written post procedure instructions were given to patient/caregiver

## 2023-06-23 NOTE — RAPID RESPONSE TEAM SUMMARY - NSTRANSFERTYPERRT_GEN_ALL_CORE
You presented to the ED after being attacked by several feral cats. Based on the bites to your thigh and possible posterior legs rabies immunoglobulin was given as well as rabies vaccine. Wounds were cleaned and bacitracin was placed. Augmentin given as antibiotic prophylaxis. A prescription was written for 3 additional doses of rabies vaccines on days 3 7 and 14. Case management will call you to set up outpatient rabies vaccination. If you are not contacted by case management you may return to the ED for vaccination as needed. Recommend applying bacitracin to the wounds once to twice a day, this is available over-the-counter. MICU

## 2023-06-23 NOTE — PROGRESS NOTE ADULT - ASSESSMENT
68M w/ ESRD on HD, functional quadriplegia (R sided BKA + L sided AKA,) blindness, CAD, HTN, HLD, CVA, on 2L home O2 who was admitted for AMS, being treated for UTI. RRT called earlier today for hypotension and HR to 40s. EKG showed slow junctional rhythm; pt received 500cc bolus, started on norepi and transferred to ICU for tx of:    Shock, sepsis + cardiogenic   Junctional Rhythm / Bradycardia  AMS  UTI  Pleural effusion s/p thoracentesis   ESRD on HD  Elevated troponin in setting of renal failure, downtrending    Underlying PMH of ESRD on HD, functional quadriplegia (R sided BKA + L sided AKA,) blindness, CAD, HTN, HLD, CVA, on 2L home O2      Plan as below:  --monitor vitals per ICU policy, monitor neuro status, tele rhythm, o2 sat  --titrate dopamine to maintain MAP >65 mmHg, norepinephrine gtt d/c'd earlier, monitor HR / rhythm; troponin downtrending in setting of renal failure, EF 40-45%; hold all antihypertensive agents, daily asa + statin; cardiology following appreciate their recc's  --empiric zosyn for tx of UTI, cultures still remain negative at this time, new set sent during RRT; fup / deescalate abx as deemed necessary, trend wbc / temp curve; procal 1.2  --s/p left thoracentesis with 1.5 liters removed, monitor, post procedure cxr with improvement noted and no ptx; cont to assess; fup associated diagnostics  --pt currently oxygenating well on 2l NC  --NPO for now, monitor blood glucose levels  --AM labs  --HD per nephrology, did not receive today in light of hypotension, closely monitor electrolytes, + phoslo  --sqh for dvt prophylaxis   --ppi for gi prophylaxis  --skin care per pt / nursing, frequent position changes  --pt / family emotional support / education  --full code status

## 2023-06-24 LAB
ALBUMIN SERPL ELPH-MCNC: 2.5 G/DL — LOW (ref 3.3–5)
ALP SERPL-CCNC: 85 U/L — SIGNIFICANT CHANGE UP (ref 40–120)
ALT FLD-CCNC: 26 U/L — SIGNIFICANT CHANGE UP (ref 12–78)
ANION GAP SERPL CALC-SCNC: 8 MMOL/L — SIGNIFICANT CHANGE UP (ref 5–17)
AST SERPL-CCNC: 31 U/L — SIGNIFICANT CHANGE UP (ref 15–37)
BILIRUB SERPL-MCNC: 0.5 MG/DL — SIGNIFICANT CHANGE UP (ref 0.2–1.2)
BUN SERPL-MCNC: 62 MG/DL — HIGH (ref 7–23)
CALCIUM SERPL-MCNC: 8.6 MG/DL — SIGNIFICANT CHANGE UP (ref 8.5–10.1)
CHLORIDE SERPL-SCNC: 97 MMOL/L — SIGNIFICANT CHANGE UP (ref 96–108)
CO2 SERPL-SCNC: 28 MMOL/L — SIGNIFICANT CHANGE UP (ref 22–31)
CREAT SERPL-MCNC: 6.81 MG/DL — HIGH (ref 0.5–1.3)
EGFR: 8 ML/MIN/1.73M2 — LOW
GLUCOSE BLDC GLUCOMTR-MCNC: 91 MG/DL — SIGNIFICANT CHANGE UP (ref 70–99)
GLUCOSE BLDC GLUCOMTR-MCNC: 92 MG/DL — SIGNIFICANT CHANGE UP (ref 70–99)
GLUCOSE SERPL-MCNC: 105 MG/DL — HIGH (ref 70–99)
HCT VFR BLD CALC: 28.7 % — LOW (ref 39–50)
HGB BLD-MCNC: 8.9 G/DL — LOW (ref 13–17)
MAGNESIUM SERPL-MCNC: 4.2 MG/DL — HIGH (ref 1.6–2.6)
MCHC RBC-ENTMCNC: 29.4 PG — SIGNIFICANT CHANGE UP (ref 27–34)
MCHC RBC-ENTMCNC: 31 G/DL — LOW (ref 32–36)
MCV RBC AUTO: 94.7 FL — SIGNIFICANT CHANGE UP (ref 80–100)
NRBC # BLD: 0 /100 WBCS — SIGNIFICANT CHANGE UP (ref 0–0)
PH FLD: 7.8 — SIGNIFICANT CHANGE UP
PHOSPHATE SERPL-MCNC: 2.3 MG/DL — LOW (ref 2.5–4.5)
PLATELET # BLD AUTO: 227 K/UL — SIGNIFICANT CHANGE UP (ref 150–400)
POTASSIUM SERPL-MCNC: 5.7 MMOL/L — HIGH (ref 3.5–5.3)
POTASSIUM SERPL-SCNC: 5.7 MMOL/L — HIGH (ref 3.5–5.3)
PROT SERPL-MCNC: 7.1 GM/DL — SIGNIFICANT CHANGE UP (ref 6–8.3)
RBC # BLD: 3.03 M/UL — LOW (ref 4.2–5.8)
RBC # FLD: 18.8 % — HIGH (ref 10.3–14.5)
SODIUM SERPL-SCNC: 133 MMOL/L — LOW (ref 135–145)
WBC # BLD: 6.8 K/UL — SIGNIFICANT CHANGE UP (ref 3.8–10.5)
WBC # FLD AUTO: 6.8 K/UL — SIGNIFICANT CHANGE UP (ref 3.8–10.5)

## 2023-06-24 PROCEDURE — 99291 CRITICAL CARE FIRST HOUR: CPT

## 2023-06-24 RX ORDER — ASPIRIN/CALCIUM CARB/MAGNESIUM 324 MG
81 TABLET ORAL DAILY
Refills: 0 | Status: DISCONTINUED | OUTPATIENT
Start: 2023-06-24 | End: 2023-06-28

## 2023-06-24 RX ORDER — DEXTROSE 50 % IN WATER 50 %
25 SYRINGE (ML) INTRAVENOUS ONCE
Refills: 0 | Status: COMPLETED | OUTPATIENT
Start: 2023-06-24 | End: 2023-06-24

## 2023-06-24 RX ADMIN — CHLORHEXIDINE GLUCONATE 1 APPLICATION(S): 213 SOLUTION TOPICAL at 11:28

## 2023-06-24 RX ADMIN — HEPARIN SODIUM 5000 UNIT(S): 5000 INJECTION INTRAVENOUS; SUBCUTANEOUS at 06:28

## 2023-06-24 RX ADMIN — PIPERACILLIN AND TAZOBACTAM 25 GRAM(S): 4; .5 INJECTION, POWDER, LYOPHILIZED, FOR SOLUTION INTRAVENOUS at 09:42

## 2023-06-24 RX ADMIN — HEPARIN SODIUM 5000 UNIT(S): 5000 INJECTION INTRAVENOUS; SUBCUTANEOUS at 13:04

## 2023-06-24 RX ADMIN — HEPARIN SODIUM 5000 UNIT(S): 5000 INJECTION INTRAVENOUS; SUBCUTANEOUS at 21:15

## 2023-06-24 RX ADMIN — PIPERACILLIN AND TAZOBACTAM 25 GRAM(S): 4; .5 INJECTION, POWDER, LYOPHILIZED, FOR SOLUTION INTRAVENOUS at 21:14

## 2023-06-24 RX ADMIN — ATORVASTATIN CALCIUM 40 MILLIGRAM(S): 80 TABLET, FILM COATED ORAL at 21:15

## 2023-06-24 RX ADMIN — PANTOPRAZOLE SODIUM 40 MILLIGRAM(S): 20 TABLET, DELAYED RELEASE ORAL at 11:23

## 2023-06-24 RX ADMIN — Medication 81 MILLIGRAM(S): at 11:23

## 2023-06-24 RX ADMIN — Medication 25 MILLILITER(S): at 10:20

## 2023-06-24 NOTE — PROGRESS NOTE ADULT - ASSESSMENT
68M w/ ESRD on HD, functional quadriplegia (R sided BKA + L sided AKA,) blindness, CAD, HTN, HLD, CVA, on 2L home O2 who was admitted for AMS, being treated for UTI. RRT called for hypotension and HR to 40s. EKG showed slow junctional rhythm; pt received 500cc bolus, started on norepi and transferred to ICU. Dopamine added for bradycardia; s/p thoracentesis.     #Neuro - pt appears more encephalopathic this morning, although when family member came he was awake and talkative; spot EEG shows no seizures, will get 24 hr EEG to ensure no seizures; baseline AAOx1-2 at baseline, continue to monitor  #CV - unable to explain change in rhythm and hypotension, now off norepi and dopamine and able to maintain BP and HR; TTE performed showing EF 40-45% w/ grade 3 diastolic dysfunction moderate pulm HTN, no percardial effusion; perhaps all sepsis induced; hold BP Meds; cardiology consulted and appreciate recs  #Pulm - satting well on 2L NC; s/p thoracentesis w/ removal of 1.5 L pleural fluid, appears transudative based on light's criteria  #ID- currently on zosyn for possible UTI, all cx NGTD; blood cx pending  #Renal/metabolic - ESRD on HD, plan for HD today- will start norepi if BP drops during HD   #GI- dysphagia screen; protonix  #Heme - noted chronic anemia, stable  #Endo - FS q6 while NPO  #PPx - HSQ q8  #Dispo- remains in ICU with changing mental status and just came off pressors, will need to monitor BP on HD in ICU; prognosis guarded; full code; palliative consult

## 2023-06-24 NOTE — PROGRESS NOTE ADULT - SUBJECTIVE AND OBJECTIVE BOX
CHIEF COMPLAINT:    Interval Events:    REVIEW OF SYSTEMS:  Constitutional: [ ] fevers [ ] chills [ ] weight loss [ ] weight gain  HEENT: [ ] dry eyes [ ] eye irritation [ ] postnasal drip [ ] nasal congestion  CV: [ ] chest pain [ ] orthopnea [ ] palpitations [ ] murmur  Resp: [ ] cough [ ] shortness of breath [ ] dyspnea [ ] wheezing [ ] sputum [ ] hemoptysis  GI: [ ] nausea [ ] vomiting [ ] diarrhea [ ] constipation [ ] abd pain [ ] dysphagia   : [ ] dysuria [ ] nocturia [ ] hematuria [ ] increased urinary frequency  Musculoskeletal: [ ] back pain [ ] myalgias [ ] arthralgias [ ] fracture  Skin: [ ] rash [ ] itch  Neurological: [ ] headache [ ] dizziness [ ] syncope [ ] weakness [ ] numbness  Hematologic/Lymphatic: [ ] anemia [ ] bleeding problem  Allergic/Immunologic: [ ] itchy eyes [ ] nasal discharge [ ] hives [ ] angioedema  [ ] All other systems negative  [ ] Unable to assess ROS because ________    OBJECTIVE:  ICU Vital Signs Last 24 Hrs  T(C): 36.8 (24 Jun 2023 05:23), Max: 37.1 (23 Jun 2023 08:35)  T(F): 98.3 (24 Jun 2023 05:23), Max: 98.7 (23 Jun 2023 08:35)  HR: 67 (24 Jun 2023 07:00) (40 - 80)  BP: 118/52 (24 Jun 2023 07:00) (82/45 - 147/52)  BP(mean): 72 (24 Jun 2023 07:00) (62 - 93)  ABP: --  ABP(mean): --  RR: 16 (24 Jun 2023 07:00) (12 - 33)  SpO2: 98% (24 Jun 2023 07:00) (72% - 100%)    O2 Parameters below as of 23 Jun 2023 07:56  Patient On (Oxygen Delivery Method): nasal cannula  O2 Flow (L/min): 2            06-23 @ 07:01  -  06-24 @ 07:00  --------------------------------------------------------  IN: 293.2 mL / OUT: 0 mL / NET: 293.2 mL      CAPILLARY BLOOD GLUCOSE      POCT Blood Glucose.: 128 mg/dL (23 Jun 2023 23:24)      PHYSICAL EXAM:  General:   HEENT:   Neck:   Respiratory:   Cardiovascular:   Abdomen:   Extremities:   Skin:   Neurological:  Psychiatry:    LINES:    HOSPITAL MEDICATIONS:  MEDICATIONS  (STANDING):  aspirin enteric coated 81 milliGRAM(s) Oral daily  atorvastatin 40 milliGRAM(s) Oral at bedtime  calcium acetate 667 milliGRAM(s) Oral three times a day with meals  chlorhexidine 2% Cloths 1 Application(s) Topical daily  DOPamine Infusion 2 MICROgram(s)/kG/Min (3.41 mL/Hr) IV Continuous <Continuous>  heparin   Injectable 5000 Unit(s) SubCutaneous every 8 hours  pantoprazole  Injectable 40 milliGRAM(s) IV Push daily  piperacillin/tazobactam IVPB.. 3.375 Gram(s) IV Intermittent every 12 hours    MEDICATIONS  (PRN):  ondansetron Injectable 4 milliGRAM(s) IV Push every 8 hours PRN Nausea and/or Vomiting      LABS:                        8.9    6.80  )-----------( 227      ( 24 Jun 2023 06:05 )             28.7     Hgb Trend: 8.9<--, 8.1<--, 8.8<--, 8.7<--  06-24    133<L>  |  97  |  62<H>  ----------------------------<  105<H>  5.7<H>   |  28  |  6.81<H>    Ca    8.6      24 Jun 2023 04:24  Phos  2.3     06-24  Mg     4.2     06-24    TPro  7.1  /  Alb  2.5<L>  /  TBili  0.5  /  DBili  x   /  AST  31  /  ALT  26  /  AlkPhos  85  06-24    PT/INR - ( 23 Jun 2023 10:25 )   PT: 14.1 sec;   INR: 1.17 ratio         PTT - ( 23 Jun 2023 10:25 )  PTT:27.4 sec  Urinalysis Basic - ( 24 Jun 2023 04:24 )    Color: x / Appearance: x / SG: x / pH: x  Gluc: 105 mg/dL / Ketone: x  / Bili: x / Urobili: x   Blood: x / Protein: x / Nitrite: x   Leuk Esterase: x / RBC: x / WBC x   Sq Epi: x / Non Sq Epi: x / Bacteria: x      Arterial Blood Gas:  06-23 @ 08:37  7.43/49/62/32/91.5/7.3  ABG lactate: --        MICROBIOLOGY:     RADIOLOGY:  [ ] Reviewed and interpreted by me CHIEF COMPLAINT:    Interval Events:  s/p thoracentesis w/ removal of 1.5L of fluid  off norepi since yesterday afternoon  dopamine turned off 9am  had 1 episode of vomiting yesterday evening    REVIEW OF SYSTEMS:  [ x] Unable to assess ROS because intermittently confused    OBJECTIVE:  ICU Vital Signs Last 24 Hrs  T(C): 36.8 (24 Jun 2023 05:23), Max: 37.1 (23 Jun 2023 08:35)  T(F): 98.3 (24 Jun 2023 05:23), Max: 98.7 (23 Jun 2023 08:35)  HR: 67 (24 Jun 2023 07:00) (40 - 80)  BP: 118/52 (24 Jun 2023 07:00) (82/45 - 147/52)  BP(mean): 72 (24 Jun 2023 07:00) (62 - 93)  ABP: --  ABP(mean): --  RR: 16 (24 Jun 2023 07:00) (12 - 33)  SpO2: 98% (24 Jun 2023 07:00) (72% - 100%)    O2 Parameters below as of 23 Jun 2023 07:56  Patient On (Oxygen Delivery Method): nasal cannula  O2 Flow (L/min): 2            06-23 @ 07:01  -  06-24 @ 07:00  --------------------------------------------------------  IN: 293.2 mL / OUT: 0 mL / NET: 293.2 mL      CAPILLARY BLOOD GLUCOSE      POCT Blood Glucose.: 128 mg/dL (23 Jun 2023 23:24)      PHYSICAL EXAM:  General: NAD, chronically ill appearing  HEENT: dry MM, EOMI  Neck: supple  Respiratory: poor inspiratory effort  Cardiovascular: s1s2 RRR  Abdomen: soft, non tender, non distended  Extremities: warm, R sided BKA + L sided AKA  Skin: stage 2 sacral wound  Neurological: unable to assess  Psychiatry: has blank stare at times, responds to his name at times    LINES:  [facility]    HOSPITAL MEDICATIONS:  MEDICATIONS  (STANDING):  aspirin enteric coated 81 milliGRAM(s) Oral daily  atorvastatin 40 milliGRAM(s) Oral at bedtime  calcium acetate 667 milliGRAM(s) Oral three times a day with meals  chlorhexidine 2% Cloths 1 Application(s) Topical daily  DOPamine Infusion 2 MICROgram(s)/kG/Min (3.41 mL/Hr) IV Continuous <Continuous>  heparin   Injectable 5000 Unit(s) SubCutaneous every 8 hours  pantoprazole  Injectable 40 milliGRAM(s) IV Push daily  piperacillin/tazobactam IVPB.. 3.375 Gram(s) IV Intermittent every 12 hours    MEDICATIONS  (PRN):  ondansetron Injectable 4 milliGRAM(s) IV Push every 8 hours PRN Nausea and/or Vomiting      LABS:                        8.9    6.80  )-----------( 227      ( 24 Jun 2023 06:05 )             28.7     Hgb Trend: 8.9<--, 8.1<--, 8.8<--, 8.7<--  06-24    133<L>  |  97  |  62<H>  ----------------------------<  105<H>  5.7<H>   |  28  |  6.81<H>    Ca    8.6      24 Jun 2023 04:24  Phos  2.3     06-24  Mg     4.2     06-24    TPro  7.1  /  Alb  2.5<L>  /  TBili  0.5  /  DBili  x   /  AST  31  /  ALT  26  /  AlkPhos  85  06-24    PT/INR - ( 23 Jun 2023 10:25 )   PT: 14.1 sec;   INR: 1.17 ratio         PTT - ( 23 Jun 2023 10:25 )  PTT:27.4 sec  Urinalysis Basic - ( 24 Jun 2023 04:24 )    Color: x / Appearance: x / SG: x / pH: x  Gluc: 105 mg/dL / Ketone: x  / Bili: x / Urobili: x   Blood: x / Protein: x / Nitrite: x   Leuk Esterase: x / RBC: x / WBC x   Sq Epi: x / Non Sq Epi: x / Bacteria: x      Arterial Blood Gas:  06-23 @ 08:37  7.43/49/62/32/91.5/7.3  ABG lactate: --    Lactate Dehydrogenase, Fluid (06.23.23 @ 14:20)    Lactate Dehydrogenase, Fluid: 75:   Lactate Dehydrogenase, Serum (06.23.23 @ 11:51)    Lactate Dehydrogenase, Serum: 282 U/L    Cell Count, Body Fluid (06.23.23 @ 14:20)    Fluid Source: Pleural   Tube Type: Lavendar   Fluid Type: Pleural Fluid   Body Fluid Appearance: Clear   Color - Body Fluid: Yellow   Total Nucleated Cell Count, Body Fluid: 70: Reference Ranges:  Synovial Fluid  Total nucleated cells < 150 cells/uL  Neutrophils <25%  Lymphocytes 10-15%  Monocytes/Macrophages </=70%  Other parameters- No established reference ranges.  Bronchoalveolar lavage  Macrophages >85%  Lymphocytes 10-15%  Neutrophils <3%  Eosinophils <1%  Other parameters- No established reference ranges.  Peritoneal/pleural/pericardial fluid/other body fluid types  No established reference ranges. /uL   Total RBC Count: 3000 /uL   Neutrophils-Body Fluid: 13 %   BF Lymphocytes: 30 %   Monocyte/Macrophage Count - Body Fluid: 57 %    Protein Total, Fluid (06.23.23 @ 14:20)    Protein Total, Fluid: 2.5:           MICROBIOLOGY:   Culture - Body Fluid with Gram Stain (06.23.23 @ 14:20)    Gram Stain:   polymorphonuclear leukocytes seen  No organisms seen  by cytocentrifuge   Specimen Source: Pleural Fl Pleural Fluid      RADIOLOGY:  [ ] Reviewed and interpreted by me    < from: TTE Echo Complete w/o Contrast w/ Doppler (06.23.23 @ 10:32) >  PHYSICIAN INTERPRETATION:  Left Ventricle:  Global LV systolic function was mildly decreased. Left ventricular   ejection fraction, by visual estimation, is 40 to 45%. Spectral Doppler   shows restrictive pattern of left ventricular myocardial filling (Grade   III diastolic dysfunction).  Right Ventricle: RV systolic function is mildly reduced.  Left Atrium: Mild to moderately enlarged left atrium. Unable to rule out   the presence of a PFO.  Pericardium: There is no evidence of pericardial effusion. There is a   large pleural effusion in the left lateral region.  Mitral Valve: Structurally normal mitral valve, with normal leaflet   excursion. Mild mitral valve regurgitation is seen.  Tricuspid Valve: Structurally normal tricuspid valve, with normal leaflet   excursion. Moderate tricuspid regurgitation is visualized. Estimated   pulmonary artery systolic pressure is 56.1 mmHg assuming a right atrial   pressure of 5 mmHg, which is consistent with moderate pulmonary   hypertension.  Aortic Valve: The aortic valve is trileaflet. Mild aortic stenosis is   present.  Pulmonic Valve: Structurally normal pulmonic valve, with normal leaflet   excursion. Trace pulmonic valve regurgitation.  Aorta: The aortic root is normal in size and structure.  Venous: The inferior vena cava was normal sized, with respiratory size   variation greater than 50%.      Summary:   1. Mildly decreased global left ventricular systolic function.   2. Left ventricular ejection fraction, by visual estimation, is 40 to   45%.   3. Spectral Doppler shows restrictive pattern of left ventricular   myocardial filling (Grade III diastolic dysfunction).   4. Mild to moderately enlarged left atrium.   5. Structurally normal mitral valve, with normal leaflet excursion.   6. Mild mitral valve regurgitation.   7. Mild aortic valve stenosis.   8. Mildly reduced RV systolic function.   9. Structurally normal tricuspid valve, with normal leaflet excursion.  10. Moderate tricuspid regurgitation.  11. Structurally normal pulmonic valve, with normal leaflet excursion.  12. Estimated pulmonary artery systolic pressure is 56.1 mmHg assuming a   right atrial pressure of 5 mmHg, which is consistent with moderate   pulmonary hypertension.  13. Large pleural effusion in the left lateral region.    < end of copied text >

## 2023-06-25 LAB
ALBUMIN SERPL ELPH-MCNC: 2.3 G/DL — LOW (ref 3.3–5)
ALP SERPL-CCNC: 74 U/L — SIGNIFICANT CHANGE UP (ref 40–120)
ALT FLD-CCNC: 20 U/L — SIGNIFICANT CHANGE UP (ref 12–78)
ANION GAP SERPL CALC-SCNC: 5 MMOL/L — SIGNIFICANT CHANGE UP (ref 5–17)
AST SERPL-CCNC: 23 U/L — SIGNIFICANT CHANGE UP (ref 15–37)
BILIRUB SERPL-MCNC: 0.4 MG/DL — SIGNIFICANT CHANGE UP (ref 0.2–1.2)
BUN SERPL-MCNC: 30 MG/DL — HIGH (ref 7–23)
CALCIUM SERPL-MCNC: 8.2 MG/DL — LOW (ref 8.5–10.1)
CHLORIDE SERPL-SCNC: 97 MMOL/L — SIGNIFICANT CHANGE UP (ref 96–108)
CO2 SERPL-SCNC: 32 MMOL/L — HIGH (ref 22–31)
CREAT SERPL-MCNC: 4.52 MG/DL — HIGH (ref 0.5–1.3)
EGFR: 13 ML/MIN/1.73M2 — LOW
GLUCOSE BLDC GLUCOMTR-MCNC: 100 MG/DL — HIGH (ref 70–99)
GLUCOSE BLDC GLUCOMTR-MCNC: 125 MG/DL — HIGH (ref 70–99)
GLUCOSE BLDC GLUCOMTR-MCNC: 75 MG/DL — SIGNIFICANT CHANGE UP (ref 70–99)
GLUCOSE BLDC GLUCOMTR-MCNC: 80 MG/DL — SIGNIFICANT CHANGE UP (ref 70–99)
GLUCOSE BLDC GLUCOMTR-MCNC: 97 MG/DL — SIGNIFICANT CHANGE UP (ref 70–99)
GLUCOSE SERPL-MCNC: 175 MG/DL — HIGH (ref 70–99)
HCT VFR BLD CALC: 27.8 % — LOW (ref 39–50)
HGB BLD-MCNC: 8.5 G/DL — LOW (ref 13–17)
MAGNESIUM SERPL-MCNC: 3.1 MG/DL — HIGH (ref 1.6–2.6)
MCHC RBC-ENTMCNC: 28.5 PG — SIGNIFICANT CHANGE UP (ref 27–34)
MCHC RBC-ENTMCNC: 30.6 G/DL — LOW (ref 32–36)
MCV RBC AUTO: 93.3 FL — SIGNIFICANT CHANGE UP (ref 80–100)
NRBC # BLD: 0 /100 WBCS — SIGNIFICANT CHANGE UP (ref 0–0)
PHOSPHATE SERPL-MCNC: 1.8 MG/DL — LOW (ref 2.5–4.5)
PLATELET # BLD AUTO: 212 K/UL — SIGNIFICANT CHANGE UP (ref 150–400)
POTASSIUM SERPL-MCNC: 4.1 MMOL/L — SIGNIFICANT CHANGE UP (ref 3.5–5.3)
POTASSIUM SERPL-SCNC: 4.1 MMOL/L — SIGNIFICANT CHANGE UP (ref 3.5–5.3)
PROT SERPL-MCNC: 6.7 GM/DL — SIGNIFICANT CHANGE UP (ref 6–8.3)
RBC # BLD: 2.98 M/UL — LOW (ref 4.2–5.8)
RBC # FLD: 19.1 % — HIGH (ref 10.3–14.5)
SODIUM SERPL-SCNC: 134 MMOL/L — LOW (ref 135–145)
WBC # BLD: 6.35 K/UL — SIGNIFICANT CHANGE UP (ref 3.8–10.5)
WBC # FLD AUTO: 6.35 K/UL — SIGNIFICANT CHANGE UP (ref 3.8–10.5)

## 2023-06-25 PROCEDURE — 99233 SBSQ HOSP IP/OBS HIGH 50: CPT

## 2023-06-25 RX ORDER — POLYETHYLENE GLYCOL 3350 17 G/17G
17 POWDER, FOR SOLUTION ORAL AT BEDTIME
Refills: 0 | Status: DISCONTINUED | OUTPATIENT
Start: 2023-06-25 | End: 2023-06-28

## 2023-06-25 RX ORDER — PANTOPRAZOLE SODIUM 20 MG/1
40 TABLET, DELAYED RELEASE ORAL
Refills: 0 | Status: DISCONTINUED | OUTPATIENT
Start: 2023-06-25 | End: 2023-06-28

## 2023-06-25 RX ORDER — DEXTROSE 50 % IN WATER 50 %
50 SYRINGE (ML) INTRAVENOUS ONCE
Refills: 0 | Status: COMPLETED | OUTPATIENT
Start: 2023-06-25 | End: 2023-06-25

## 2023-06-25 RX ORDER — SODIUM,POTASSIUM PHOSPHATES 278-250MG
1 POWDER IN PACKET (EA) ORAL ONCE
Refills: 0 | Status: COMPLETED | OUTPATIENT
Start: 2023-06-25 | End: 2023-06-25

## 2023-06-25 RX ADMIN — Medication 667 MILLIGRAM(S): at 09:42

## 2023-06-25 RX ADMIN — CHLORHEXIDINE GLUCONATE 1 APPLICATION(S): 213 SOLUTION TOPICAL at 05:30

## 2023-06-25 RX ADMIN — Medication 62.5 MILLIMOLE(S): at 06:21

## 2023-06-25 RX ADMIN — HEPARIN SODIUM 5000 UNIT(S): 5000 INJECTION INTRAVENOUS; SUBCUTANEOUS at 22:57

## 2023-06-25 RX ADMIN — POLYETHYLENE GLYCOL 3350 17 GRAM(S): 17 POWDER, FOR SOLUTION ORAL at 22:58

## 2023-06-25 RX ADMIN — Medication 667 MILLIGRAM(S): at 11:50

## 2023-06-25 RX ADMIN — Medication 81 MILLIGRAM(S): at 11:54

## 2023-06-25 RX ADMIN — PANTOPRAZOLE SODIUM 40 MILLIGRAM(S): 20 TABLET, DELAYED RELEASE ORAL at 11:54

## 2023-06-25 RX ADMIN — HEPARIN SODIUM 5000 UNIT(S): 5000 INJECTION INTRAVENOUS; SUBCUTANEOUS at 15:17

## 2023-06-25 RX ADMIN — Medication 667 MILLIGRAM(S): at 17:56

## 2023-06-25 RX ADMIN — ATORVASTATIN CALCIUM 40 MILLIGRAM(S): 80 TABLET, FILM COATED ORAL at 22:57

## 2023-06-25 RX ADMIN — Medication 1 PACKET(S): at 13:13

## 2023-06-25 RX ADMIN — HEPARIN SODIUM 5000 UNIT(S): 5000 INJECTION INTRAVENOUS; SUBCUTANEOUS at 05:18

## 2023-06-25 RX ADMIN — Medication 50 MILLILITER(S): at 02:00

## 2023-06-25 NOTE — PROGRESS NOTE ADULT - SUBJECTIVE AND OBJECTIVE BOX
CHIEF COMPLAINT:    Interval Events:    REVIEW OF SYSTEMS:  Constitutional: [ ] fevers [ ] chills [ ] weight loss [ ] weight gain  HEENT: [ ] dry eyes [ ] eye irritation [ ] postnasal drip [ ] nasal congestion  CV: [ ] chest pain [ ] orthopnea [ ] palpitations [ ] murmur  Resp: [ ] cough [ ] shortness of breath [ ] dyspnea [ ] wheezing [ ] sputum [ ] hemoptysis  GI: [ ] nausea [ ] vomiting [ ] diarrhea [ ] constipation [ ] abd pain [ ] dysphagia   : [ ] dysuria [ ] nocturia [ ] hematuria [ ] increased urinary frequency  Musculoskeletal: [ ] back pain [ ] myalgias [ ] arthralgias [ ] fracture  Skin: [ ] rash [ ] itch  Neurological: [ ] headache [ ] dizziness [ ] syncope [ ] weakness [ ] numbness  Hematologic/Lymphatic: [ ] anemia [ ] bleeding problem  Allergic/Immunologic: [ ] itchy eyes [ ] nasal discharge [ ] hives [ ] angioedema  [ ] All other systems negative  [ ] Unable to assess ROS because ________    OBJECTIVE:  ICU Vital Signs Last 24 Hrs  T(C): 36.5 (25 Jun 2023 05:30), Max: 36.9 (24 Jun 2023 12:00)  T(F): 97.7 (25 Jun 2023 05:30), Max: 98.4 (24 Jun 2023 12:00)  HR: 67 (25 Jun 2023 07:30) (62 - 77)  BP: 136/58 (25 Jun 2023 07:30) (110/48 - 154/65)  BP(mean): 82 (25 Jun 2023 07:30) (67 - 91)  ABP: --  ABP(mean): --  RR: 13 (25 Jun 2023 07:30) (11 - 27)  SpO2: 100% (25 Jun 2023 07:30) (93% - 100%)    O2 Parameters below as of 24 Jun 2023 17:00  Patient On (Oxygen Delivery Method): nasal cannula  O2 Flow (L/min): 2            06-24 @ 07:01  -  06-25 @ 07:00  --------------------------------------------------------  IN: 500 mL / OUT: 2035 mL / NET: -1535 mL      CAPILLARY BLOOD GLUCOSE      POCT Blood Glucose.: 125 mg/dL (25 Jun 2023 05:19)      PHYSICAL EXAM:  General:   HEENT:   Neck:   Respiratory:   Cardiovascular:   Abdomen:   Extremities:   Skin:   Neurological:  Psychiatry:    LINES:    HOSPITAL MEDICATIONS:  MEDICATIONS  (STANDING):  aspirin  chewable 81 milliGRAM(s) Oral daily  atorvastatin 40 milliGRAM(s) Oral at bedtime  calcium acetate 667 milliGRAM(s) Oral three times a day with meals  chlorhexidine 2% Cloths 1 Application(s) Topical daily  heparin   Injectable 5000 Unit(s) SubCutaneous every 8 hours  pantoprazole  Injectable 40 milliGRAM(s) IV Push daily    MEDICATIONS  (PRN):  ondansetron Injectable 4 milliGRAM(s) IV Push every 8 hours PRN Nausea and/or Vomiting      LABS:                        8.5    6.35  )-----------( 212      ( 25 Jun 2023 03:20 )             27.8     Hgb Trend: 8.5<--, 8.9<--, 8.1<--, 8.8<--, 8.7<--  06-25    134<L>  |  97  |  30<H>  ----------------------------<  175<H>  4.1   |  32<H>  |  4.52<H>    Ca    8.2<L>      25 Jun 2023 03:20  Phos  1.8     06-25  Mg     3.1     06-25    TPro  6.7  /  Alb  2.3<L>  /  TBili  0.4  /  DBili  x   /  AST  23  /  ALT  20  /  AlkPhos  74  06-25    PT/INR - ( 23 Jun 2023 10:25 )   PT: 14.1 sec;   INR: 1.17 ratio         PTT - ( 23 Jun 2023 10:25 )  PTT:27.4 sec  Urinalysis Basic - ( 25 Jun 2023 03:20 )    Color: x / Appearance: x / SG: x / pH: x  Gluc: 175 mg/dL / Ketone: x  / Bili: x / Urobili: x   Blood: x / Protein: x / Nitrite: x   Leuk Esterase: x / RBC: x / WBC x   Sq Epi: x / Non Sq Epi: x / Bacteria: x      Arterial Blood Gas:  06-23 @ 08:37  7.43/49/62/32/91.5/7.3  ABG lactate: --        MICROBIOLOGY:     RADIOLOGY:  [ ] Reviewed and interpreted by me CHIEF COMPLAINT:    Interval Events:  tolerated HD with stable BP, no longer requiring pressors  woke up and was appropriate when niece came to bedside    REVIEW OF SYSTEMS:  [ x] Unable to assess ROS because intermittently confused    OBJECTIVE:  ICU Vital Signs Last 24 Hrs  T(C): 36.5 (25 Jun 2023 05:30), Max: 36.9 (24 Jun 2023 12:00)  T(F): 97.7 (25 Jun 2023 05:30), Max: 98.4 (24 Jun 2023 12:00)  HR: 67 (25 Jun 2023 07:30) (62 - 77)  BP: 136/58 (25 Jun 2023 07:30) (110/48 - 154/65)  BP(mean): 82 (25 Jun 2023 07:30) (67 - 91)  ABP: --  ABP(mean): --  RR: 13 (25 Jun 2023 07:30) (11 - 27)  SpO2: 100% (25 Jun 2023 07:30) (93% - 100%)    O2 Parameters below as of 24 Jun 2023 17:00  Patient On (Oxygen Delivery Method): nasal cannula  O2 Flow (L/min): 2            06-24 @ 07:01  -  06-25 @ 07:00  --------------------------------------------------------  IN: 500 mL / OUT: 2035 mL / NET: -1535 mL      CAPILLARY BLOOD GLUCOSE      POCT Blood Glucose.: 125 mg/dL (25 Jun 2023 05:19)      PHYSICAL EXAM:  General: NAD, chronically ill appearing  HEENT: dry MM, EOMI  Neck: supple  Respiratory: poor inspiratory effort  Cardiovascular: s1s2 RRR  Abdomen: soft, non tender, non distended  Extremities: warm, R sided BKA + L sided AKA  Skin: stage 2 sacral wound  Neurological: no focal deficits  Psychiatry: awake and alert today, answering some questions    LINES:  [facility]    HOSPITAL MEDICATIONS:  MEDICATIONS  (STANDING):  aspirin  chewable 81 milliGRAM(s) Oral daily  atorvastatin 40 milliGRAM(s) Oral at bedtime  calcium acetate 667 milliGRAM(s) Oral three times a day with meals  chlorhexidine 2% Cloths 1 Application(s) Topical daily  heparin   Injectable 5000 Unit(s) SubCutaneous every 8 hours  pantoprazole  Injectable 40 milliGRAM(s) IV Push daily    MEDICATIONS  (PRN):  ondansetron Injectable 4 milliGRAM(s) IV Push every 8 hours PRN Nausea and/or Vomiting      LABS:                        8.5    6.35  )-----------( 212      ( 25 Jun 2023 03:20 )             27.8     Hgb Trend: 8.5<--, 8.9<--, 8.1<--, 8.8<--, 8.7<--  06-25    134<L>  |  97  |  30<H>  ----------------------------<  175<H>  4.1   |  32<H>  |  4.52<H>    Ca    8.2<L>      25 Jun 2023 03:20  Phos  1.8     06-25  Mg     3.1     06-25    TPro  6.7  /  Alb  2.3<L>  /  TBili  0.4  /  DBili  x   /  AST  23  /  ALT  20  /  AlkPhos  74  06-25    PT/INR - ( 23 Jun 2023 10:25 )   PT: 14.1 sec;   INR: 1.17 ratio         PTT - ( 23 Jun 2023 10:25 )  PTT:27.4 sec  Urinalysis Basic - ( 25 Jun 2023 03:20 )    Color: x / Appearance: x / SG: x / pH: x  Gluc: 175 mg/dL / Ketone: x  / Bili: x / Urobili: x   Blood: x / Protein: x / Nitrite: x   Leuk Esterase: x / RBC: x / WBC x   Sq Epi: x / Non Sq Epi: x / Bacteria: x      Arterial Blood Gas:  06-23 @ 08:37  7.43/49/62/32/91.5/7.3  ABG lactate: --        MICROBIOLOGY:     RADIOLOGY:  [ ] Reviewed and interpreted by me

## 2023-06-25 NOTE — PROGRESS NOTE ADULT - ASSESSMENT
68M w/ ESRD on HD, functional quadriplegia (R sided BKA + L sided AKA,) blindness, CAD, HTN, HLD, CVA, on 2L home O2 who was admitted for AMS, being treated for UTI. RRT called for hypotension and HR to 40s. EKG showed slow junctional rhythm; pt received 500cc bolus, started on norepi and transferred to ICU. Dopamine added for bradycardia; s/p thoracentesis. Now off all pressors and improved overall.     #Neuro - encephalopathy that occurred yesterday has resolved, was awake and talking when niece came yesterday and answered questions today; spot EEG shows no seizures, will get 24 hr EEG to ensure no seizures; baseline AAOx1-2 at baseline, continue to monitor  #CV - unable to explain change in rhythm and hypotension, now off norepi and dopamine and able to maintain BP and HR; TTE performed showing EF 40-45% w/ grade 3 diastolic dysfunction moderate pulm HTN, no percardial effusion; perhaps all sepsis induced; continue to hold BP Meds for now  #Pulm - satting well on 2L NC; s/p thoracentesis w/ removal of 1.5 L pleural fluid, appears transudative based on light's criteria  #ID- completed 5 days of zosyn for possible UTI; all cx including pleural fluid cx remain NGTD  #Renal/metabolic - ESRD on HD, tolerated HD yesterday without need for vasopressors  #GI- started on puree diet, poor PO intake; switch to PO protonix  #Heme - noted chronic anemia, stable  #Endo - FS q6 while NPO  #PPx - HSQ q8  #Dispo- monitor in ICU today; prognosis guarded; full code; palliative consult

## 2023-06-25 NOTE — PROGRESS NOTE ADULT - SUBJECTIVE AND OBJECTIVE BOX
CARDIOLOGY PROGRESS NOTE  68M with ESRD on HD MWF, s/p R BKA, L  AKA blindness, CAD, HTN, HLD, on 2L home O2 who presents to the ED for AMS.  pt remains altered. all information obtained from chart. pt sent in from HD with confusion, agitation. being treated here for presumed UTI. pt initially normotensive in SR. today pt had a rapid response for junctional rhythm pt still in it current HR 45, narrow complex, occasional sinus beats. during rapid pt also found to be acutely hypotensive to 70s systolic, pt transferred to ICU, levo started currently pt is awake, alert, a and o x0. does not appear to be in distress. HR 40 on levo. map 62 .  no evidence of melena, abd soft non tender. hgb 8.8. on last admission in  hgb 10s.    Allergies  No Known Allergies  Intolerances  	 MEDICATIONS  (STANDING): aspirin  chewable 81 milliGRAM(s) Oral daily atorvastatin 40 milliGRAM(s) Oral at bedtime calcium acetate 667 milliGRAM(s) Oral three times a day with meals chlorhexidine 2% Cloths 1 Application(s) Topical daily heparin   Injectable 5000 Unit(s) SubCutaneous every 8 hours pantoprazole  Injectable 40 milliGRAM(s) IV Push daily  PAST MEDICAL & SURGICAL HISTORY: DM (diabetes mellitus) HTN (hypertension) Below knee amputation status, right CKD (chronic kidney disease) MRSA (methicillin resistant Staphylococcus aureus) colonization (hx/o MRSA growth from wound culture) Wound (chronic wounds to left foot and leg) DM (diabetes mellitus) HTN (hypertension) Kidney disease  H/O peripheral artery bypass - left fem to below-knee pop with RSVG Amputated left leg - above knee Amputated right leg - below knee  FAMILY HISTORY: Family history of diabetes mellitus   PHYSICAL EXAMINATION: ----------------------------- T(C): 36.4 (23 @ 08:30), Max: 36.9 (23 @ 12:00) HR: 67 (23 @ 09:30) (62 - 77) BP: 137/59 (23 @ 09:30) (110/48 - 154/65) RR: 15 (23 @ 09:30) (11 - 27) SpO2: 100% (23 @ 09:30) (99% - 100%) Wt(kg): --   @ 07:01  -   @ 07:00 -------------------------------------------------------- IN:   IV PiggyBack: 250 mL   Oral Fluid: 250 mL Total IN: 500 mL  OUT:   Other (mL): 2000 mL   Voided (mL): 35 mL Total OUT: 2035 mL  Total NET: -1535 mL      Constitutional: well developed, normal appearance, well groomed, well nourished, no deformities and no acute distress.  Eyes: the conjunctiva exhibited no abnormalities and the eyelids demonstrated no xanthelasmas.  HEENT: normal oral mucosa, no oral pallor and no oral cyanosis.  Neck: normal jugular venous A waves present, normal jugular venous V waves present and no jugular venous gaitan A waves.  Pulmonary: no respiratory distress, normal respiratory rhythm and effort, no accessory muscle use and lungs were clear to auscultation bilaterally.  Cardiovascular: heart rate and rhythm were normal, normal S1 and S2 and no murmur, gallop, rub, heave or thrill are present.  Abdomen: soft, non-tender, no hepato-splenomegaly and no abdominal mass palpated.  Musculoskeletal: the gait could not be assessed..  Extremities: s/p b/l amputations. Skin: normal skin color and pigmentation, no rash, no venous stasis, no skin lesions, no skin ulcer and no xanthoma was observed.  Psychiatric: oriented to person, place, and time, the affect was normal, the mood was normal and not feeling anxious.   LABS:  --------   134<L>  |  97  |  30<H> ----------------------------<  175<H> 4.1   |  32<H>  |  4.52<H>  Ca    8.2<L>      2023 03:20 Phos  1.8      Mg     3.1       TPro  6.7  /  Alb  2.3<L>  /  TBili  0.4  /  DBili  x   /  AST  23  /  ALT  20  /  AlkPhos  74                         8.5   6.35  )-----------( 212      ( 2023 03:20 )            27.8    PT/INR - ( 2023 10:25 )   PT: 14.1 sec;   INR: 1.17 ratio      PTT - ( 2023 10:25 )  PTT:27.4 sec    Culture Results:  No growth ( @ 14:20) Culture Results:  No growth to date. ( @ 10:25) Culture Results:  No growth to date. ( @ 10:25)  DIAGNOSTIC TESTING:   [ ] Echocardiogram: < from: TTE Echo Complete w/o Contrast w/ Doppler (23 @ 10:32) >  ACC: [id number] EXAM:  ECHO TTE WO CON COMP W DOPP                        PROCEDURE DATE:  2023      INTERPRETATION:  TRANSTHORACIC ECHOCARDIOGRAM REPORT    Patient Name:   JAMES PATRICK Patient Location: [facility] Rec #:  [id number]     Accession #:      [id number] Account #:      [id number]     Height:           65.7 in 167.0 cm YOB: 1955       Weight:           100.1 lb 45.40 kg Patient Age:    68 years       BSA:              1.49 m² Patient Gender: M      BP:               110/52 mmHg   Date of Exam:        2023 10:32:01 AM Sonographer:         ANUJA Referring Physician: GABINO  Procedure:   2D Echo/Doppler/Color Doppler Complete. Indications: R57.9 - Shock, unspecified Diagnosis:R57.9    2D AND M-MODE MEASUREMENTS (normal ranges within parentheses): Left                 Normal   Aorta/Left            Normal Ventricle:                    Atrium: IVSd (2D):    1.02  (0.7-1.1) Aortic Root    2.70  (2.4-3.7)   cm             (2D):           cm LVPWd (2D):   0.88  (0.7-1.1) Left Atrium    4.60  (1.9-4.0)                cm             (2D):           cm LVIDd (2D):   5.26  (3.4-5.7) LA Vol Index   36.7                cm             (A4C):        ml/m² LVIDs (2D):   3.86            LA Vol Index   43.5                cm             (A2C):        ml/m² LV FS (2D):   26.6   (>25%)   LA Vol Index   45.9                 %             (BP):         ml/m² Relative Wall 0.34   (<0.42)  Right Thickness                    Ventricle:                               TAPSE:          1.30 cm  LV DIASTOLIC FUNCTION: MV Peak E: 1.25 m/s e', MV Davina:  0.06 m/s MV Peak A: 0.30 m/s E/e' Ratio:  20.82 E/A Ratio: 4.21     Decel Time:  116 msec Septal e'  0.1 m/s  Septal E/e'  23.5 Lateral e' 0.1 m/s  Lateral E/e' 19.1  SPECTRAL DOPPLER ANALYSIS (where applicable): Mitral Valve: MV P1/2 Time: 33.67 msec MV Area, PHT: 6.53 cm²  Aortic Valve: AoV Max : 1.97 m/s AoV Peak PG: 15.5 mmHg AoV Mean P.3 mmHg  LVOT Vmax: 0.80 m/s LVOT VTI: 0.182 m LVOT Diameter: 1.96 cm  AoV Area, Vmax: 1.22 cm² AoV Area, VTI: 1.17 cm² AoV Area, Vmn:  Tricuspid Valve and PA/RV Systolic Pressure: TR Max Velocity: 3.57 m/s RA  Pressure: 5 mmHg RVSP/PASP: 56.1 mmHg   PHYSICIAN INTERPRETATION: Left Ventricle: Global LV systolic function was mildly decreased. Left ventricular  ejection fraction, by visual estimation, is 40 to 45%. Spectral Doppler  shows restrictive pattern of left ventricular myocardial filling (Grade  III diastolic dysfunction). Right Ventricle: RV systolic function is mildly reduced. Left Atrium: Mild to moderately enlarged left atrium. Unable to rule out  the presence of a PFO. Pericardium: There is no evidence of pericardial effusion. There is a  large pleural effusion in the left lateral region. Mitral Valve: Structurally normal mitral valve, with normal leaflet  excursion. Mild mitral valve regurgitation is seen. Tricuspid Valve: Structurally normal tricuspid valve, with normal leaflet  excursion. Moderate tricuspid regurgitation is visualized. Estimated  pulmonary artery systolic pressure is 56.1 mmHg assuming a right atrial  pressure of 5 mmHg, which is consistent with moderate pulmonary  hypertension. Aortic Valve: The aortic valve is trileaflet. Mild aortic stenosis is  present. Pulmonic Valve: Structurally normal pulmonic valve, with normal leaflet  excursion. Trace pulmonic valve regurgitation. Aorta: The aortic root is normal in size and structure. Venous: The inferior vena cava was normal sized, with respiratory size  variation greater than 50%.   Summary:  1. Mildly decreased global left ventricular systolic function.  2. Left ventricular ejection fraction, by visual estimation, is 40 to  45%.  3. Spectral Doppler shows restrictive pattern of left ventricular  myocardial filling (Grade III diastolic dysfunction).  4. Mild to moderately enlarged left atrium.  5. Structurally normal mitral valve, with normal leaflet excursion.  6. Mild mitral valve regurgitation.  7. Mild aortic valve stenosis.  8. Mildly reduced RV systolic function.  9. Structurally normal tricuspid valve, with normal leaflet excursion. 10. Moderate tricuspid regurgitation. 11. Structurally normal pulmonic valve, with normal leaflet excursion. 12. Estimated pulmonary artery systolic pressure is 56.1 mmHg assuming a  right atrial pressure of 5 mmHg, which is consistent with moderate  pulmonary hypertension. 13. Large pleural effusion in the left lateral region.   [id number] Jerrod Horta MD, FACC, RVPI Electronically signed on 2023 at 1:09:09 PM    < from: Xray Chest 1 View- PORTABLE-Urgent (Xray Chest 1 View- PORTABLE-Urgent .) (23 @ 14:59) >  ACC: [id number] EXAM:  XR CHEST PORTABLE URGENT 1V   ORDERED BY: MANJINDER LLOYD   PROCEDURE DATE:  2023      INTERPRETATION:  AP chest on 2023 at 2:17 PM. Patient had left  thoracentesis.  Heart magnified by technique. Leftloop recorder again noted. Vascular  stent in the left upper arm again seen.  On  earlier study there was a moderate loculated left effusion  laterally. This is markedly diminished with minimal residual after  thoracentesis. No pneumothorax.  IMPRESSION: Markedly diminished left effusion after thoracentesis.  --- End of Report ---      JOE HERNÁNDEZ MD; Attending Radiologist This document has been electronically signed. 2023  3:20PM  < end of copied text >   *** Final ***  < end of copied text >  [ ]  Catheterization: [ ] Stress Test:  	 	 ASSESSMENT/PLAN: 	  Jerrod Horta MD    CARDIOLOGY PROGRESS NOTE  68M with ESRD on HD MWF, s/p R BKA, L  AKA blindness, CAD, HTN, HLD, on 2L home O2 who presents to the ED for AMS.  pt remains altered. all information obtained from chart. pt sent in from HD with confusion, agitation. being treated here for presumed UTI. pt initially normotensive in SR. today pt had a rapid response for junctional rhythm pt still in it current HR 45, narrow complex, occasional sinus beats. during rapid pt also found to be acutely hypotensive to 70s systolic, pt transferred to ICU, levo started   Currently pt is lethargic, not responding to verbal commands, no acute distress.   Allergies  No Known Allergies  Intolerances  	 MEDICATIONS  (STANDING): aspirin  chewable 81 milliGRAM(s) Oral daily atorvastatin 40 milliGRAM(s) Oral at bedtime calcium acetate 667 milliGRAM(s) Oral three times a day with meals chlorhexidine 2% Cloths 1 Application(s) Topical daily heparin   Injectable 5000 Unit(s) SubCutaneous every 8 hours pantoprazole  Injectable 40 milliGRAM(s) IV Push daily  PAST MEDICAL & SURGICAL HISTORY: DM (diabetes mellitus) HTN (hypertension) Below knee amputation status, right CKD (chronic kidney disease) MRSA (methicillin resistant Staphylococcus aureus) colonization (hx/o MRSA growth from wound culture) Wound (chronic wounds to left foot and leg) DM (diabetes mellitus) HTN (hypertension) Kidney disease  H/O peripheral artery bypass - left fem to below-knee pop with RSVG Amputated left leg - above knee Amputated right leg - below knee  FAMILY HISTORY: Family history of diabetes mellitus   PHYSICAL EXAMINATION: ----------------------------- T(C): 36.4 (23 @ 08:30), Max: 36.9 (23 @ 12:00) HR: 67 (23 @ 09:30) (62 - 77) BP: 137/59 (23 @ 09:30) (110/48 - 154/65) RR: 15 (23 @ 09:30) (11 - 27) SpO2: 100% (23 @ 09:30) (99% - 100%) Wt(kg): --   @ 07: @ 07:00 -------------------------------------------------------- IN:   IV PiggyBack: 250 mL   Oral Fluid: 250 mL Total IN: 500 mL  OUT:   Other (mL): 2000 mL   Voided (mL): 35 mL Total OUT:  mL  Total NET: -1535 mL      Constitutional: well developed, normal appearance, well groomed, well nourished, no deformities and no acute distress.  Eyes: the conjunctiva exhibited no abnormalities and the eyelids demonstrated no xanthelasmas.  HEENT: normal oral mucosa, no oral pallor and no oral cyanosis.  Neck: normal jugular venous A waves present, normal jugular venous V waves present and no jugular venous gaitan A waves.  Pulmonary: no respiratory distress, normal respiratory rhythm and effort, no accessory muscle use and lungs were clear to auscultation bilaterally.  Cardiovascular: heart rate and rhythm were normal, normal S1 and S2 and no murmur, gallop, rub, heave or thrill are present.  Abdomen: soft, non-tender, no hepato-splenomegaly and no abdominal mass palpated.  Musculoskeletal: the gait could not be assessed..  Extremities: s/p b/l amputations. Skin: normal skin color and pigmentation, no rash, no venous stasis, no skin lesions, no skin ulcer and no xanthoma was observed.  Psychiatric: oriented to person, place, and time, the affect was normal, the mood was normal and not feeling anxious.   LABS:  --------   134<L>  |  97  |  30<H> ----------------------------<  175<H> 4.1   |  32<H>  |  4.52<H>  Ca    8.2<L>      2023 03:20 Phos  1.8      Mg     3.1       TPro  6.7  /  Alb  2.3<L>  /  TBili  0.4  /  DBili  x   /  AST  23  /  ALT  20  /  AlkPhos  74                         8.5   6.35  )-----------( 212      ( 2023 03:20 )            27.8    PT/INR - ( 2023 10:25 )   PT: 14.1 sec;   INR: 1.17 ratio      PTT - ( 2023 10:25 )  PTT:27.4 sec    Culture Results:  No growth ( @ 14:20) Culture Results:  No growth to date. ( @ 10:25) Culture Results:  No growth to date. ( @ 10:25)  DIAGNOSTIC TESTING:   [ ] Echocardiogram: < from: TTE Echo Complete w/o Contrast w/ Doppler (23 @ 10:32) >  ACC: [id number] EXAM:  ECHO TTE WO CON COMP W DOPP                        PROCEDURE DATE:  2023      INTERPRETATION:  TRANSTHORACIC ECHOCARDIOGRAM REPORT    Patient Name:   JAMES PATRICK Patient Location: [facility] Rec #:  [id number]     Accession #:      [id number] Account #:      [id number]     Height:           65.7 in 167.0 cm YOB: 1955       Weight:           100.1 lb 45.40 kg Patient Age:    68 years       BSA:              1.49 m² Patient Gender: M      BP:               110/52 mmHg   Date of Exam:        2023 10:32:01 AM Sonographer:         ANUJA Referring Physician: GABINO  Procedure:   2D Echo/Doppler/Color Doppler Complete. Indications: R57.9 - Shock, unspecified Diagnosis:R57.9    2D AND M-MODE MEASUREMENTS (normal ranges within parentheses): Left                 Normal   Aorta/Left            Normal Ventricle:                    Atrium: IVSd (2D):    1.02  (0.7-1.1) Aortic Root    2.70  (2.4-3.7)   cm             (2D):           cm LVPWd (2D):   0.88  (0.7-1.1) Left Atrium    4.60  (1.9-4.0)                cm             (2D):           cm LVIDd (2D):   5.26  (3.4-5.7) LA Vol Index   36.7                cm             (A4C):        ml/m² LVIDs (2D):   3.86            LA Vol Index   43.5                cm             (A2C):        ml/m² LV FS (2D):   26.6   (>25%)   LA Vol Index   45.9                 %             (BP):         ml/m² Relative Wall 0.34   (<0.42)  Right Thickness                    Ventricle:                               TAPSE:          1.30 cm  LV DIASTOLIC FUNCTION: MV Peak E: 1.25 m/s e', MV Davina:  0.06 m/s MV Peak A: 0.30 m/s E/e' Ratio:  20.82 E/A Ratio: 4.21     Decel Time:  116 msec Septal e'  0.1 m/s  Septal E/e'  23.5 Lateral e' 0.1 m/s  Lateral E/e' 19.1  SPECTRAL DOPPLER ANALYSIS (where applicable): Mitral Valve: MV P1/2 Time: 33.67 msec MV Area, PHT: 6.53 cm²  Aortic Valve: AoV Max : 1.97 m/s AoV Peak PG: 15.5 mmHg AoV Mean P.3 mmHg  LVOT Vmax: 0.80 m/s LVOT VTI: 0.182 m LVOT Diameter: 1.96 cm  AoV Area, Vmax: 1.22 cm² AoV Area, VTI: 1.17 cm² AoV Area, Vmn:  Tricuspid Valve and PA/RV Systolic Pressure: TR Max Velocity: 3.57 m/s RA  Pressure: 5 mmHg RVSP/PASP: 56.1 mmHg   PHYSICIAN INTERPRETATION: Left Ventricle: Global LV systolic function was mildly decreased. Left ventricular  ejection fraction, by visual estimation, is 40 to 45%. Spectral Doppler  shows restrictive pattern of left ventricular myocardial filling (Grade  III diastolic dysfunction). Right Ventricle: RV systolic function is mildly reduced. Left Atrium: Mild to moderately enlarged left atrium. Unable to rule out  the presence of a PFO. Pericardium: There is no evidence of pericardial effusion. There is a  large pleural effusion in the left lateral region. Mitral Valve: Structurally normal mitral valve, with normal leaflet  excursion. Mild mitral valve regurgitation is seen. Tricuspid Valve: Structurally normal tricuspid valve, with normal leaflet  excursion. Moderate tricuspid regurgitation is visualized. Estimated  pulmonary artery systolic pressure is 56.1 mmHg assuming a right atrial  pressure of 5 mmHg, which is consistent with moderate pulmonary  hypertension. Aortic Valve: The aortic valve is trileaflet. Mild aortic stenosis is  present. Pulmonic Valve: Structurally normal pulmonic valve, with normal leaflet  excursion. Trace pulmonic valve regurgitation. Aorta: The aortic root is normal in size and structure. Venous: The inferior vena cava was normal sized, with respiratory size  variation greater than 50%.   Summary:  1. Mildly decreased global left ventricular systolic function.  2. Left ventricular ejection fraction, by visual estimation, is 40 to  45%.  3. Spectral Doppler shows restrictive pattern of left ventricular  myocardial filling (Grade III diastolic dysfunction).  4. Mild to moderately enlarged left atrium.  5. Structurally normal mitral valve, with normal leaflet excursion.  6. Mild mitral valve regurgitation.  7. Mild aortic valve stenosis.  8. Mildly reduced RV systolic function.  9. Structurally normal tricuspid valve, with normal leaflet excursion. 10. Moderate tricuspid regurgitation. 11. Structurally normal pulmonic valve, with normal leaflet excursion. 12. Estimated pulmonary artery systolic pressure is 56.1 mmHg assuming a  right atrial pressure of 5 mmHg, which is consistent with moderate  pulmonary hypertension. 13. Large pleural effusion in the left lateral region.   [id number] Jerrod Horta MD, FACC, RVPI Electronically signed on 2023 at 1:09:09 PM    < from: Xray Chest 1 View- PORTABLE-Urgent (Xray Chest 1 View- PORTABLE-Urgent .) (23 @ 14:59) >  ACC: [id number] EXAM:  XR CHEST PORTABLE URGENT 1V   ORDERED BY: MANJINDER LLOYD   PROCEDURE DATE:  2023      INTERPRETATION:  AP chest on 2023 at 2:17 PM. Patient had left  thoracentesis.  Heart magnified by technique. Leftloop recorder again noted. Vascular  stent in the left upper arm again seen.  On  earlier study there was a moderate loculated left effusion  laterally. This is markedly diminished with minimal residual after  thoracentesis. No pneumothorax.  IMPRESSION: Markedly diminished left effusion after thoracentesis.  --- End of Report ---      JOE HERNÁNDEZ MD; Attending Radiologist This document has been electronically signed. 2023  3:20PM  < end of copied text >

## 2023-06-25 NOTE — PROGRESS NOTE ADULT - ASSESSMENT
68M with ESRD on HD MWF, s/p R BKA, L AKA blindness, CAD, HTN, HLD, on 2L home O2 who presents to the ED for AMS.    Treated for presumed UTI, had acute hypotension requiring pressor support and developed intermittent junctional rhythm (qrs remains narrow., HR in 40s) now off pressors. HRs have normalized. Acute sepsis related?    TTE showed mildly decreased LVEF, with grade III diastolic dysfunction, mod TR, mod pulm HTN.  s/p thoracentesis.  Waxing/waning encephalopathy.     68M with ESRD on HD MWF, s/p R BKA, L AKA blindness, CAD, HTN, HLD, on 2L home O2 who presents to the ED for AMS.    Treated for presumed UTI, had acute hypotension requiring pressor support and developed intermittent junctional rhythm (qrs remains narrow., HR in 40s) now off pressors. HRs have normalized. Acute sepsis related?    TTE showed mildly decreased LVEF, with grade III diastolic dysfunction, mod TR, mod pulm HTN.  s/p thoracentesis.  Waxing/waning encephalopathy.    No further dysrhythmia, needs resolution of encephalopathy prior to any further cardiac evaluation.

## 2023-06-26 ENCOUNTER — NON-APPOINTMENT (OUTPATIENT)
Age: 68
End: 2023-06-26

## 2023-06-26 ENCOUNTER — APPOINTMENT (OUTPATIENT)
Dept: ELECTROPHYSIOLOGY | Facility: CLINIC | Age: 68
End: 2023-06-26
Payer: MEDICAID

## 2023-06-26 DIAGNOSIS — R53.2 FUNCTIONAL QUADRIPLEGIA: ICD-10-CM

## 2023-06-26 DIAGNOSIS — Z51.5 ENCOUNTER FOR PALLIATIVE CARE: ICD-10-CM

## 2023-06-26 DIAGNOSIS — J90 PLEURAL EFFUSION, NOT ELSEWHERE CLASSIFIED: ICD-10-CM

## 2023-06-26 DIAGNOSIS — I50.9 HEART FAILURE, UNSPECIFIED: ICD-10-CM

## 2023-06-26 LAB
ALBUMIN SERPL ELPH-MCNC: 2.3 G/DL — LOW (ref 3.3–5)
ALP SERPL-CCNC: 77 U/L — SIGNIFICANT CHANGE UP (ref 40–120)
ALT FLD-CCNC: 17 U/L — SIGNIFICANT CHANGE UP (ref 12–78)
ANION GAP SERPL CALC-SCNC: 8 MMOL/L — SIGNIFICANT CHANGE UP (ref 5–17)
AST SERPL-CCNC: 20 U/L — SIGNIFICANT CHANGE UP (ref 15–37)
BASOPHILS # BLD AUTO: 0.08 K/UL — SIGNIFICANT CHANGE UP (ref 0–0.2)
BASOPHILS NFR BLD AUTO: 1.4 % — SIGNIFICANT CHANGE UP (ref 0–2)
BILIRUB SERPL-MCNC: 0.4 MG/DL — SIGNIFICANT CHANGE UP (ref 0.2–1.2)
BUN SERPL-MCNC: 40 MG/DL — HIGH (ref 7–23)
CALCIUM SERPL-MCNC: 8.4 MG/DL — LOW (ref 8.5–10.1)
CHLORIDE SERPL-SCNC: 98 MMOL/L — SIGNIFICANT CHANGE UP (ref 96–108)
CO2 SERPL-SCNC: 31 MMOL/L — SIGNIFICANT CHANGE UP (ref 22–31)
CREAT SERPL-MCNC: 5.95 MG/DL — HIGH (ref 0.5–1.3)
EGFR: 10 ML/MIN/1.73M2 — LOW
EOSINOPHIL # BLD AUTO: 0.61 K/UL — HIGH (ref 0–0.5)
EOSINOPHIL NFR BLD AUTO: 10.9 % — HIGH (ref 0–6)
GLUCOSE SERPL-MCNC: 83 MG/DL — SIGNIFICANT CHANGE UP (ref 70–99)
HCT VFR BLD CALC: 29 % — LOW (ref 39–50)
HGB BLD-MCNC: 8.9 G/DL — LOW (ref 13–17)
IMM GRANULOCYTES NFR BLD AUTO: 0.5 % — SIGNIFICANT CHANGE UP (ref 0–0.9)
LYMPHOCYTES # BLD AUTO: 0.83 K/UL — LOW (ref 1–3.3)
LYMPHOCYTES # BLD AUTO: 14.9 % — SIGNIFICANT CHANGE UP (ref 13–44)
MAGNESIUM SERPL-MCNC: 3.4 MG/DL — HIGH (ref 1.6–2.6)
MCHC RBC-ENTMCNC: 28.7 PG — SIGNIFICANT CHANGE UP (ref 27–34)
MCHC RBC-ENTMCNC: 30.7 G/DL — LOW (ref 32–36)
MCV RBC AUTO: 93.5 FL — SIGNIFICANT CHANGE UP (ref 80–100)
MONOCYTES # BLD AUTO: 0.61 K/UL — SIGNIFICANT CHANGE UP (ref 0–0.9)
MONOCYTES NFR BLD AUTO: 10.9 % — SIGNIFICANT CHANGE UP (ref 2–14)
NEUTROPHILS # BLD AUTO: 3.42 K/UL — SIGNIFICANT CHANGE UP (ref 1.8–7.4)
NEUTROPHILS NFR BLD AUTO: 61.4 % — SIGNIFICANT CHANGE UP (ref 43–77)
NRBC # BLD: 0 /100 WBCS — SIGNIFICANT CHANGE UP (ref 0–0)
PHOSPHATE SERPL-MCNC: 2.3 MG/DL — LOW (ref 2.5–4.5)
PLATELET # BLD AUTO: 219 K/UL — SIGNIFICANT CHANGE UP (ref 150–400)
POTASSIUM SERPL-MCNC: 4.7 MMOL/L — SIGNIFICANT CHANGE UP (ref 3.5–5.3)
POTASSIUM SERPL-SCNC: 4.7 MMOL/L — SIGNIFICANT CHANGE UP (ref 3.5–5.3)
PROT SERPL-MCNC: 6.9 GM/DL — SIGNIFICANT CHANGE UP (ref 6–8.3)
RBC # BLD: 3.1 M/UL — LOW (ref 4.2–5.8)
RBC # FLD: 19.4 % — HIGH (ref 10.3–14.5)
SODIUM SERPL-SCNC: 137 MMOL/L — SIGNIFICANT CHANGE UP (ref 135–145)
WBC # BLD: 5.58 K/UL — SIGNIFICANT CHANGE UP (ref 3.8–10.5)
WBC # FLD AUTO: 5.58 K/UL — SIGNIFICANT CHANGE UP (ref 3.8–10.5)

## 2023-06-26 PROCEDURE — 93298 REM INTERROG DEV EVAL SCRMS: CPT

## 2023-06-26 PROCEDURE — G2066: CPT | Mod: NC

## 2023-06-26 PROCEDURE — 99233 SBSQ HOSP IP/OBS HIGH 50: CPT

## 2023-06-26 RX ADMIN — Medication 81 MILLIGRAM(S): at 12:35

## 2023-06-26 RX ADMIN — PANTOPRAZOLE SODIUM 40 MILLIGRAM(S): 20 TABLET, DELAYED RELEASE ORAL at 06:48

## 2023-06-26 RX ADMIN — Medication 667 MILLIGRAM(S): at 16:53

## 2023-06-26 RX ADMIN — HEPARIN SODIUM 5000 UNIT(S): 5000 INJECTION INTRAVENOUS; SUBCUTANEOUS at 05:52

## 2023-06-26 RX ADMIN — Medication 667 MILLIGRAM(S): at 12:34

## 2023-06-26 RX ADMIN — ATORVASTATIN CALCIUM 40 MILLIGRAM(S): 80 TABLET, FILM COATED ORAL at 22:00

## 2023-06-26 RX ADMIN — Medication 667 MILLIGRAM(S): at 08:52

## 2023-06-26 RX ADMIN — CHLORHEXIDINE GLUCONATE 1 APPLICATION(S): 213 SOLUTION TOPICAL at 12:34

## 2023-06-26 RX ADMIN — HEPARIN SODIUM 5000 UNIT(S): 5000 INJECTION INTRAVENOUS; SUBCUTANEOUS at 14:59

## 2023-06-26 RX ADMIN — HEPARIN SODIUM 5000 UNIT(S): 5000 INJECTION INTRAVENOUS; SUBCUTANEOUS at 21:59

## 2023-06-26 NOTE — CHART NOTE - NSCHARTNOTEFT_GEN_A_CORE
ICU DOWN GRADE NOTE  Accepting MD:   Patient is a 68y old  Male who presents with a chief complaint of AMS (26 Jun 2023 13:49)    HPI:  Patient is a 68M with a PMH of ESRD on HD MWF, functional quadriplegia (R sided BKA + L sided AKA,) blindness, CAD, HTN, HLD, on 2L home O2 who presents to the ED for AMS.  Patient currently AAOx1, unable to provide history.  Per ED staff, patient was sent to the ED from his HD center earlier today.  Patient reportedly became confused and combative with staff - who were unable to start HD.  ED also reached out to family members who stated that the patient currently lives in a rehab center.  Was noted to be restless yesterday evening.  Vitals stable, labs show elevated troponin levels.  Admitted to tele. On 6/23 Pt was RRT  for hypotension and bradycardia with HR in 40's with vomiting episode. Junctional rhythm noted and pt started initially on norepi gtt which has been transitioned to dopamine gtt. Large left pleural effusion noted with 1.5 liters removed via thoracentesis. Fluid was transudative. Pt O2 requirements improved to 1LNC. Pt then came off all pressors. Pt has since been HD stable.  Pt had brief episode of unresponsiveness on 6/23. Spot EEG negative. Pt pending MRI and cEEG for further eval. Pt has not had any further episodes during ICU stay.     INTERVAL HPI/OVERNIGHT EVENTS: no acute events overnight. Pt pending cEEG. Pt awake, alert, and responding appropriately today.       MEDICATIONS:  aspirin  chewable 81 milliGRAM(s) Oral daily  atorvastatin 40 milliGRAM(s) Oral at bedtime  calcium acetate 667 milliGRAM(s) Oral three times a day with meals  chlorhexidine 2% Cloths 1 Application(s) Topical daily  heparin   Injectable 5000 Unit(s) SubCutaneous every 8 hours  ondansetron Injectable 4 milliGRAM(s) IV Push every 8 hours PRN  pantoprazole    Tablet 40 milliGRAM(s) Oral before breakfast  polyethylene glycol 3350 17 Gram(s) Oral at bedtime      T(C): 36.4 (06-26-23 @ 10:00), Max: 37.4 (06-25-23 @ 20:00)  HR: 69 (06-26-23 @ 15:00) (67 - 76)  BP: 129/54 (06-26-23 @ 15:00) (119/53 - 145/71)  RR: 17 (06-26-23 @ 15:00) (0 - 26)  SpO2: 100% (06-26-23 @ 15:00) (89% - 100%)  Wt(kg): --Vital Signs Last 24 Hrs  T(C): 36.4 (26 Jun 2023 10:00), Max: 37.4 (25 Jun 2023 20:00)  T(F): 97.5 (26 Jun 2023 10:00), Max: 99.3 (25 Jun 2023 20:00)  HR: 69 (26 Jun 2023 15:00) (67 - 76)  BP: 129/54 (26 Jun 2023 15:00) (119/53 - 145/71)  BP(mean): 77 (26 Jun 2023 15:00) (70 - 94)  RR: 17 (26 Jun 2023 15:00) (0 - 26)  SpO2: 100% (26 Jun 2023 15:00) (89% - 100%)    Parameters below as of 26 Jun 2023 15:00  Patient On (Oxygen Delivery Method): nasal cannula  O2 Flow (L/min): 1      PHYSICAL EXAM:  GENERAL: NAD, well-groomed, well-developed  ENMT: Moist mucous membranes, No lesions  NECK: Supple, No JVD, Normal thyroid  CHEST/LUNG: Clear to auscultation bilaterally; No rales, rhonchi, wheezing, or rubs  HEART: Regular rate and rhythm; No murmurs, rubs, or gallops  ABDOMEN: Soft, Nontender, Nondistended; Bowel sounds present  EXTREMITIES:  2+ Peripheral Pulses, No clubbing, cyanosis, or edema; R BKA, L AKA.   Skin: stage 2 sacral wound  NERVOUS SYSTEM:  Awake and alert, answering questions appropriately, moving all extremities.    Consultant(s) Notes Reviewed:  [x ] YES  [ ] NO  Care Discussed with Consultants/Other Providers [ x] YES  [ ] NO    LABS:                        8.9    5.58  )-----------( 219      ( 26 Jun 2023 03:39 )             29.0     06-26    137  |  98  |  40<H>  ----------------------------<  83  4.7   |  31  |  5.95<H>    Ca    8.4<L>      26 Jun 2023 03:39  Phos  2.3     06-26  Mg     3.4     06-26    TPro  6.9  /  Alb  2.3<L>  /  TBili  0.4  /  DBili  x   /  AST  20  /  ALT  17  /  AlkPhos  77  06-26      Urinalysis Basic - ( 26 Jun 2023 03:39 )    Color: x / Appearance: x / SG: x / pH: x  Gluc: 83 mg/dL / Ketone: x  / Bili: x / Urobili: x   Blood: x / Protein: x / Nitrite: x   Leuk Esterase: x / RBC: x / WBC x   Sq Epi: x / Non Sq Epi: x / Bacteria: x      CAPILLARY BLOOD GLUCOSE      POCT Blood Glucose.: 97 mg/dL (25 Jun 2023 18:33)        Urinalysis Basic - ( 26 Jun 2023 03:39 )    Color: x / Appearance: x / SG: x / pH: x  Gluc: 83 mg/dL / Ketone: x  / Bili: x / Urobili: x   Blood: x / Protein: x / Nitrite: x   Leuk Esterase: x / RBC: x / WBC x   Sq Epi: x / Non Sq Epi: x / Bacteria: x        RADIOLOGY & ADDITIONAL TESTS:    Imaging Personally Reviewed:  [x ] YES  [ ] NO    67 yo M with ESRD on HD, functional quadriplegia (R sided BKA + L sided AKA,) blindness, CAD, HTN, HLD, CVA, on 2L home O2 a/w  AMS, being treated for UTI. RRT called for hypotension and HR to 40s. EKG showed slow junctional rhythm; pt received 500cc bolus, started on norepi and transferred to ICU. Dopamine added for bradycardia; s/p thoracentesis. Now off all pressors and improved overall.    To follow up:  - pt s/p septic shock, likely from UTI. s/p course zosyn. currently HD stable and afebrile. cx NGTD. cont to monitor BP  - s/p left thora w/transudative effusion. f/u final pleural fluid cx  -episode of unreponsiveness after RRT. Pt has not had any further episodes. Spot EEG neg. f/u MRI and cEEG.   -ESRD on HD. plan for HD tomorrow. f/u renal recs    case and plan discussed with Dr West ICU DOWN GRADE NOTE  Accepting MD: dr ramos   Patient is a 68y old  Male who presents with a chief complaint of AMS (26 Jun 2023 13:49)    HPI:  Patient is a 68M with a PMH of ESRD on HD MWF, functional quadriplegia (R sided BKA + L sided AKA,) blindness, CAD, HTN, HLD, on 2L home O2 who presents to the ED for AMS.  Patient currently AAOx1, unable to provide history.  Per ED staff, patient was sent to the ED from his HD center earlier today.  Patient reportedly became confused and combative with staff - who were unable to start HD.  ED also reached out to family members who stated that the patient currently lives in a rehab center.  Was noted to be restless yesterday evening.  Vitals stable, labs show elevated troponin levels.  Admitted to tele. On 6/23 Pt was RRT  for hypotension and bradycardia with HR in 40's with vomiting episode. Junctional rhythm noted and pt started initially on norepi gtt which has been transitioned to dopamine gtt. Large left pleural effusion noted with 1.5 liters removed via thoracentesis. Fluid was transudative. Pt O2 requirements improved to 1LNC. Pt then came off all pressors. Pt has since been HD stable.  Pt had brief episode of unresponsiveness on 6/23. Spot EEG negative. Pt pending MRI and cEEG for further eval. Pt has not had any further episodes during ICU stay.     INTERVAL HPI/OVERNIGHT EVENTS: no acute events overnight. Pt pending cEEG. Pt awake, alert, and responding appropriately today.       MEDICATIONS:  aspirin  chewable 81 milliGRAM(s) Oral daily  atorvastatin 40 milliGRAM(s) Oral at bedtime  calcium acetate 667 milliGRAM(s) Oral three times a day with meals  chlorhexidine 2% Cloths 1 Application(s) Topical daily  heparin   Injectable 5000 Unit(s) SubCutaneous every 8 hours  ondansetron Injectable 4 milliGRAM(s) IV Push every 8 hours PRN  pantoprazole    Tablet 40 milliGRAM(s) Oral before breakfast  polyethylene glycol 3350 17 Gram(s) Oral at bedtime      T(C): 36.4 (06-26-23 @ 10:00), Max: 37.4 (06-25-23 @ 20:00)  HR: 69 (06-26-23 @ 15:00) (67 - 76)  BP: 129/54 (06-26-23 @ 15:00) (119/53 - 145/71)  RR: 17 (06-26-23 @ 15:00) (0 - 26)  SpO2: 100% (06-26-23 @ 15:00) (89% - 100%)  Wt(kg): --Vital Signs Last 24 Hrs  T(C): 36.4 (26 Jun 2023 10:00), Max: 37.4 (25 Jun 2023 20:00)  T(F): 97.5 (26 Jun 2023 10:00), Max: 99.3 (25 Jun 2023 20:00)  HR: 69 (26 Jun 2023 15:00) (67 - 76)  BP: 129/54 (26 Jun 2023 15:00) (119/53 - 145/71)  BP(mean): 77 (26 Jun 2023 15:00) (70 - 94)  RR: 17 (26 Jun 2023 15:00) (0 - 26)  SpO2: 100% (26 Jun 2023 15:00) (89% - 100%)    Parameters below as of 26 Jun 2023 15:00  Patient On (Oxygen Delivery Method): nasal cannula  O2 Flow (L/min): 1      PHYSICAL EXAM:  GENERAL: NAD, well-groomed, well-developed  ENMT: Moist mucous membranes, No lesions  NECK: Supple, No JVD, Normal thyroid  CHEST/LUNG: Clear to auscultation bilaterally; No rales, rhonchi, wheezing, or rubs  HEART: Regular rate and rhythm; No murmurs, rubs, or gallops  ABDOMEN: Soft, Nontender, Nondistended; Bowel sounds present  EXTREMITIES:  2+ Peripheral Pulses, No clubbing, cyanosis, or edema; R BKA, L AKA.   Skin: stage 2 sacral wound  NERVOUS SYSTEM:  Awake and alert, answering questions appropriately, moving all extremities.    Consultant(s) Notes Reviewed:  [x ] YES  [ ] NO  Care Discussed with Consultants/Other Providers [ x] YES  [ ] NO    LABS:                        8.9    5.58  )-----------( 219      ( 26 Jun 2023 03:39 )             29.0     06-26    137  |  98  |  40<H>  ----------------------------<  83  4.7   |  31  |  5.95<H>    Ca    8.4<L>      26 Jun 2023 03:39  Phos  2.3     06-26  Mg     3.4     06-26    TPro  6.9  /  Alb  2.3<L>  /  TBili  0.4  /  DBili  x   /  AST  20  /  ALT  17  /  AlkPhos  77  06-26      Urinalysis Basic - ( 26 Jun 2023 03:39 )    Color: x / Appearance: x / SG: x / pH: x  Gluc: 83 mg/dL / Ketone: x  / Bili: x / Urobili: x   Blood: x / Protein: x / Nitrite: x   Leuk Esterase: x / RBC: x / WBC x   Sq Epi: x / Non Sq Epi: x / Bacteria: x      CAPILLARY BLOOD GLUCOSE      POCT Blood Glucose.: 97 mg/dL (25 Jun 2023 18:33)        Urinalysis Basic - ( 26 Jun 2023 03:39 )    Color: x / Appearance: x / SG: x / pH: x  Gluc: 83 mg/dL / Ketone: x  / Bili: x / Urobili: x   Blood: x / Protein: x / Nitrite: x   Leuk Esterase: x / RBC: x / WBC x   Sq Epi: x / Non Sq Epi: x / Bacteria: x        RADIOLOGY & ADDITIONAL TESTS:    Imaging Personally Reviewed:  [x ] YES  [ ] NO    67 yo M with ESRD on HD, functional quadriplegia (R sided BKA + L sided AKA,) blindness, CAD, HTN, HLD, CVA, on 2L home O2 a/w  AMS, being treated for UTI. RRT called for hypotension and HR to 40s. EKG showed slow junctional rhythm; pt received 500cc bolus, started on norepi and transferred to ICU. Dopamine added for bradycardia; s/p thoracentesis. Now off all pressors and improved overall. Stable for downgrade to tele    To follow up:  - pt s/p septic shock, likely from UTI. s/p course zosyn. currently HD stable and afebrile. cx NGTD. cont to monitor BP  - s/p left thora w/transudative effusion. f/u final pleural fluid cx  -episode of unreponsiveness after RRT. Pt has not had any further episodes. Spot EEG neg. f/u MRI and cEEG.   -ESRD on HD. plan for HD tomorrow. f/u renal recs    case and plan discussed with Dr West and Dr Ramos

## 2023-06-26 NOTE — CONSULT NOTE ADULT - ASSESSMENT
68 year old male PMH of ESRD on HD, CAD, HTN, HLD functional quadriplegia with right BKA and left AKA,and on 2L oxygen via NC at baseline presented to the ED from HD for AMS and combative behavior.  Patient admitted to general medical floor with RRT for hypotension and transferred to ICU and started on pressor and inotropic support (no longer on pressor/inotropic therapy).  Palliative care consulted for GOC.

## 2023-06-26 NOTE — CONSULT NOTE ADULT - PROBLEM SELECTOR RECOMMENDATION 9
patient confused, spot EEG with no signs of seizure activity, -Mild diffuse cerebral dysfunction is nonspecific in etiology. pending 24 hour EEG  CTH with no acute findings, old ischemic events seen and pending MR brain  urine culture negative, blood cultures no growth on prelim result pending final results, pleural fluid culture with no growth on prelim and fungal testing pending did receive IV abx  neuro checks as ordered  promote sleep/wake cycle, promote family presence

## 2023-06-26 NOTE — CONSULT NOTE ADULT - PROBLEM SELECTOR RECOMMENDATION 6
See GOC above.  Patient at risk for further decline and limited reserves.  Family would want to pursue all LST in effort to prolong life.  Patient verbally indicated his niece, Ramonita would be his decision maker, no HCP on file, has 3 adult children.  Discussed [facility] surrogacy law with niece, encouraged ongoing GOC conversations and to update family.    BECKI Acevedo

## 2023-06-26 NOTE — PROGRESS NOTE ADULT - SUBJECTIVE AND OBJECTIVE BOX
JAMES PATRICK  68y  Patient is a 68y old  Male who presents with a chief complaint of AMS (2023 08:28)    HPI:  Seen and examined.  No new complaints.  Lethargic.    HEALTH ISSUES - PROBLEM Dx:  Metabolic encephalopathy    ESRD on dialysis    Essential hypertension    Hyperlipidemia    CAD (coronary artery disease)    Troponin level elevated          MEDICATIONS  (STANDING):  aspirin  chewable 81 milliGRAM(s) Oral daily  atorvastatin 40 milliGRAM(s) Oral at bedtime  calcium acetate 667 milliGRAM(s) Oral three times a day with meals  chlorhexidine 2% Cloths 1 Application(s) Topical daily  heparin   Injectable 5000 Unit(s) SubCutaneous every 8 hours  pantoprazole    Tablet 40 milliGRAM(s) Oral before breakfast  polyethylene glycol 3350 17 Gram(s) Oral at bedtime    MEDICATIONS  (PRN):  ondansetron Injectable 4 milliGRAM(s) IV Push every 8 hours PRN Nausea and/or Vomiting    Vital Signs Last 24 Hrs  T(C): 36.4 (2023 10:00), Max: 37.4 (2023 20:00)  T(F): 97.5 (2023 10:00), Max: 99.3 (2023 20:00)  HR: 74 (2023 13:00) (67 - 76)  BP: 138/65 (2023 13:00) (115/53 - 145/71)  BP(mean): 87 (2023 13:00) (70 - 94)  RR: 18 (2023 13:00) (0 - 26)  SpO2: 100% (2023 13:00) (89% - 100%)    Parameters below as of 2023 13:00  Patient On (Oxygen Delivery Method): nasal cannula  O2 Flow (L/min): 1    Daily     Daily Weight in k.3 (2023 05:00)    PHYSICAL EXAM:  Constitutional:  He appears comfortable and not distressed. Not diaphoretic.    Neck:  The thyroid is normal. Trachea is midline.     Breasts: Normal examination.    Respiratory: The lungs are clear to auscultation. No dullness and expansion is normal.    Cardiovascular: S1 and S2 are normal. No murmurs rubs or gallops are present.    Gastrointestinal: The abdomen is soft. No tenderness is present. No masses are present. Bowel sounds are normal.    Genitourinary: The bladder is not distended. No CVA tenderness is present.    Extremities: No edema is noted. Right AKA, left BKA.    Neurological: Blind. Lethargic. Tone, power and sensation are normal.     Skin: No lesions are seen  or palpated.    Lymph Nodes: No lymphadenopathy is present.                              8.9    5.58  )-----------( 219      ( 2023 03:39 )             29.0         137  |  98  |  40<H>  ----------------------------<  83  4.7   |  31  |  5.95<H>    Ca    8.4<L>      2023 03:39  Phos  2.3       Mg     3.4         TPro  6.9  /  Alb  2.3<L>  /  TBili  0.4  /  DBili  x   /  AST  20  /  ALT  17  /  AlkPhos  77      Urinalysis Basic - ( 2023 03:39 )    Color: x / Appearance: x / SG: x / pH: x  Gluc: 83 mg/dL / Ketone: x  / Bili: x / Urobili: x   Blood: x / Protein: x / Nitrite: x   Leuk Esterase: x / RBC: x / WBC x   Sq Epi: x / Non Sq Epi: x / Bacteria: x

## 2023-06-26 NOTE — CONSULT NOTE ADULT - PROBLEM SELECTOR RECOMMENDATION 2
h/o HF with reduced EF, possible source of effusion? had thoracentesis on 6/23, drained 1500mL of fluid, prelim culture with no growth, fungal testing pending, follow up imaging show improvement of effusion

## 2023-06-26 NOTE — PROGRESS NOTE ADULT - SUBJECTIVE AND OBJECTIVE BOX
INTERVAL HPI/OVERNIGHT EVENTS:      CENTRAL LINE: [ ] YES [ ] NO  LOCATION:       DUPONT: [ ] YES [ ] NO        A-LINE:  [ ] YES [ ] NO  LOCATION:       GLOBAL ISSUE/BEST PRACTICE:  Analgesia:   Sedation:  HOB elevation: yes  Stress ulcer prophylaxis:   VTE prophylaxis: aspirin  chewable 81 milliGRAM(s) Oral daily  heparin   Injectable 5000 Unit(s) SubCutaneous every 8 hours    Oral Care: Chlorhexidine  Glycemic control:   Nutrition:Diet, Pureed:   Moderately Thick Liquids (MODTHICKLIQS) (06-24-23 @ 11:40) [Active]          REVIEW OF SYSTEMS:  [ ] Unable to obtain because:  [x] All other systems negative      CONSTITUTIONAL: No fever, weight loss, or fatigue  EYES: No eye pain, visual disturbances, or discharge  ENMT:  No difficulty hearing, tinnitus, vertigo; No sinus or throat pain  NECK: No pain or stiffness  RESPIRATORY: No cough, wheezing, chills or hemoptysis; No shortness of breath  CARDIOVASCULAR: No chest pain, palpitations, dizziness, or leg swelling  GASTROINTESTINAL: No abdominal or epigastric pain. No nausea, vomiting, or hematemesis; No diarrhea or constipation. No melena or hematochezia.  GENITOURINARY: No dysuria, frequency, hematuria, or incontinence  NEUROLOGICAL: No headaches, memory loss, loss of strength, numbness, or tremors  SKIN: No itching, burning, rashes, or lesions     PHYSICAL EXAM:    GENERAL: NAD, well-groomed, well-developed  HEAD:  Atraumatic, Normocephalic  EYES: EOMI, PERRLA, conjunctiva and sclera clear  ENMT: No tonsillar erythema, exudates, or enlargement; Moist mucous membranes, No lesions  NECK: Supple, No JVD, Normal thyroid  CHEST/LUNG: Clear to auscultation bilaterally; No rales, rhonchi, wheezing, or rubs  HEART: Regular rate and rhythm; No murmurs, rubs, or gallops  ABDOMEN: Soft, Nontender, Nondistended; Bowel sounds present  EXTREMITIES:  2+ Peripheral Pulses, No clubbing, cyanosis, or edema  LYMPH: No lymphadenopathy noted  SKIN: No rashes or lesions  NERVOUS SYSTEM:  Alert & Oriented X3, Good concentration; Motor Strength 5/5 B/L upper and lower extremities; DTRs 2+ intact and symmetric    ICU Vital Signs Last 24 Hrs  T(C): 37 (26 Jun 2023 04:49), Max: 37.4 (25 Jun 2023 20:00)  T(F): 98.6 (26 Jun 2023 04:49), Max: 99.3 (25 Jun 2023 20:00)  HR: 67 (26 Jun 2023 06:00) (64 - 76)  BP: 142/62 (26 Jun 2023 06:00) (110/71 - 146/52)  BP(mean): 86 (26 Jun 2023 06:00) (69 - 86)  ABP: --  ABP(mean): --  RR: 20 (26 Jun 2023 06:00) (0 - 23)  SpO2: 100% (26 Jun 2023 06:00) (96% - 100%)    O2 Parameters below as of 25 Jun 2023 20:00  Patient On (Oxygen Delivery Method): nasal cannula    O2 Concentration (%): 2      I&O's Detail    25 Jun 2023 07:01  -  26 Jun 2023 07:00  --------------------------------------------------------  IN:    Oral Fluid: 420 mL  Total IN: 420 mL    OUT:    Stool (mL): 0 mL  Total OUT: 0 mL    Total NET: 420 mL        MEDICATIONS  NEURO  Meds: ondansetron Injectable 4 milliGRAM(s) IV Push every 8 hours PRN Nausea and/or Vomiting    RESPIRATORY    Meds:   CARDIOVASCULAR  Meds:   GI/NUTRITION  Meds: pantoprazole    Tablet 40 milliGRAM(s) Oral before breakfast  polyethylene glycol 3350 17 Gram(s) Oral at bedtime    GENITOURINARY  Meds: calcium acetate 667 milliGRAM(s) Oral three times a day with meals    HEMATOLOGIC  Meds: aspirin  chewable 81 milliGRAM(s) Oral daily  heparin   Injectable 5000 Unit(s) SubCutaneous every 8 hours    [x] VTE Prophylaxis  INFECTIOUS DISEASES  Meds:   ENDOCRINE  CAPILLARY BLOOD GLUCOSE      POCT Blood Glucose.: 97 mg/dL (25 Jun 2023 18:33)  POCT Blood Glucose.: 100 mg/dL (25 Jun 2023 15:30)    Meds:   OTHER MEDICATIONS:  chlorhexidine 2% Cloths 1 Application(s) Topical daily  :    LABS:                        8.9    5.58  )-----------( 219      ( 26 Jun 2023 03:39 )             29.0      06-26    137  |  98  |  40<H>  ----------------------------<  83  4.7   |  31  |  5.95<H>    Ca    8.4<L>      26 Jun 2023 03:39  Phos  2.3     06-26  Mg     3.4     06-26    TPro  6.9  /  Alb  2.3<L>  /  TBili  0.4  /  DBili  x   /  AST  20  /  ALT  17  /  AlkPhos  77  06-26      Urinalysis Basic - ( 26 Jun 2023 03:39 )    Color: x / Appearance: x / SG: x / pH: x  Gluc: 83 mg/dL / Ketone: x  / Bili: x / Urobili: x   Blood: x / Protein: x / Nitrite: x   Leuk Esterase: x / RBC: x / WBC x   Sq Epi: x / Non Sq Epi: x / Bacteria: x      Culture Results:   No growth (06-23 @ 14:20)  Culture Results:   No growth to date. (06-23 @ 10:25)  Culture Results:   No growth to date. (06-23 @ 10:25)            RADIOLOGY & ADDITIONAL STUDIES:     INTERVAL HPI/OVERNIGHT EVENTS:    No acute overnight events; off pressors.  Pending HD.  Bradycardia resolved.      CENTRAL LINE: [ ] YES [ x] NO  LOCATION:       DUPONT: [ ] YES [x ] NO        A-LINE:  [ ] YES [x ] NO  LOCATION:       GLOBAL ISSUE/BEST PRACTICE:  Analgesia:   Sedation:  HOB elevation: yes  Stress ulcer prophylaxis:   VTE prophylaxis: aspirin  chewable 81 milliGRAM(s) Oral daily  heparin   Injectable 5000 Unit(s) SubCutaneous every 8 hours    Oral Care: Chlorhexidine  Glycemic control:   Nutrition:Diet, Pureed:   Moderately Thick Liquids (MODTHICKLIQS) (06-24-23 @ 11:40) [Active]          REVIEW OF SYSTEMS:  [x] All other systems negative    CONSTITUTIONAL: No fever, weight loss, or fatigue  NECK: No pain or stiffness  RESPIRATORY: No cough, No shortness of breath  CARDIOVASCULAR: No chest pain, palpitations, dizziness, or leg swelling  GASTROINTESTINAL: No abdominal or epigastric pain. No nausea, vomiting, or hematemesis; No diarrhea or constipation. No melena or hematochezia.  GENITOURINARY: No dysuria,  NEUROLOGICAL: No headaches, memory loss, loss of strength, numbness, or tremors  SKIN: No itching, rashes, or lesions     PHYSICAL EXAM:    GENERAL: NAD, well-groomed, well-developed  ENMT: Moist mucous membranes, No lesions  NECK: Supple, No JVD, Normal thyroid  CHEST/LUNG: Clear to auscultation bilaterally; No rales, rhonchi, wheezing, or rubs  HEART: Regular rate and rhythm; No murmurs, rubs, or gallops  ABDOMEN: Soft, Nontender, Nondistended; Bowel sounds present  EXTREMITIES:  2+ Peripheral Pulses, No clubbing, cyanosis, or edema; R BKA, L AKA.   Skin: stage 2 sacral wound  NERVOUS SYSTEM:  Awake and alert, answering questions appropriately, moving all extremities.    ICU Vital Signs Last 24 Hrs  T(C): 37 (26 Jun 2023 04:49), Max: 37.4 (25 Jun 2023 20:00)  T(F): 98.6 (26 Jun 2023 04:49), Max: 99.3 (25 Jun 2023 20:00)  HR: 67 (26 Jun 2023 06:00) (64 - 76)  BP: 142/62 (26 Jun 2023 06:00) (110/71 - 146/52)  BP(mean): 86 (26 Jun 2023 06:00) (69 - 86)  ABP: --  ABP(mean): --  RR: 20 (26 Jun 2023 06:00) (0 - 23)  SpO2: 100% (26 Jun 2023 06:00) (96% - 100%)    O2 Parameters below as of 25 Jun 2023 20:00  Patient On (Oxygen Delivery Method): nasal cannula    O2 Concentration (%): 2      I&O's Detail    25 Jun 2023 07:01  -  26 Jun 2023 07:00  --------------------------------------------------------  IN:    Oral Fluid: 420 mL  Total IN: 420 mL    OUT:    Stool (mL): 0 mL  Total OUT: 0 mL    Total NET: 420 mL        MEDICATIONS  NEURO  Meds: ondansetron Injectable 4 milliGRAM(s) IV Push every 8 hours PRN Nausea and/or Vomiting    RESPIRATORY    Meds:   CARDIOVASCULAR  Meds:   GI/NUTRITION  Meds: pantoprazole    Tablet 40 milliGRAM(s) Oral before breakfast  polyethylene glycol 3350 17 Gram(s) Oral at bedtime    GENITOURINARY  Meds: calcium acetate 667 milliGRAM(s) Oral three times a day with meals    HEMATOLOGIC  Meds: aspirin  chewable 81 milliGRAM(s) Oral daily  heparin   Injectable 5000 Unit(s) SubCutaneous every 8 hours    [x] VTE Prophylaxis  INFECTIOUS DISEASES  Meds:   ENDOCRINE  CAPILLARY BLOOD GLUCOSE      POCT Blood Glucose.: 97 mg/dL (25 Jun 2023 18:33)  POCT Blood Glucose.: 100 mg/dL (25 Jun 2023 15:30)    Meds:   OTHER MEDICATIONS:  chlorhexidine 2% Cloths 1 Application(s) Topical daily  :    LABS:                        8.9    5.58  )-----------( 219      ( 26 Jun 2023 03:39 )             29.0      06-26    137  |  98  |  40<H>  ----------------------------<  83  4.7   |  31  |  5.95<H>    Ca    8.4<L>      26 Jun 2023 03:39  Phos  2.3     06-26  Mg     3.4     06-26    TPro  6.9  /  Alb  2.3<L>  /  TBili  0.4  /  DBili  x   /  AST  20  /  ALT  17  /  AlkPhos  77  06-26      Urinalysis Basic - ( 26 Jun 2023 03:39 )    Color: x / Appearance: x / SG: x / pH: x  Gluc: 83 mg/dL / Ketone: x  / Bili: x / Urobili: x   Blood: x / Protein: x / Nitrite: x   Leuk Esterase: x / RBC: x / WBC x   Sq Epi: x / Non Sq Epi: x / Bacteria: x      Culture Results:   No growth (06-23 @ 14:20)  Culture Results:   No growth to date. (06-23 @ 10:25)  Culture Results:   No growth to date. (06-23 @ 10:25)            RADIOLOGY & ADDITIONAL STUDIES:     INTERVAL HPI/OVERNIGHT EVENTS:    No acute overnight events; off pressors.   Bradycardia resolved.      CENTRAL LINE: [ ] YES [ x] NO  LOCATION:       DUPONT: [ ] YES [x ] NO        A-LINE:  [ ] YES [x ] NO  LOCATION:       GLOBAL ISSUE/BEST PRACTICE:  Analgesia:   Sedation:  HOB elevation: yes  Stress ulcer prophylaxis:   VTE prophylaxis: aspirin  chewable 81 milliGRAM(s) Oral daily  heparin   Injectable 5000 Unit(s) SubCutaneous every 8 hours    Oral Care: Chlorhexidine  Glycemic control:   Nutrition:Diet, Pureed:   Moderately Thick Liquids (MODTHICKLIQS) (06-24-23 @ 11:40) [Active]          REVIEW OF SYSTEMS:  [x] All other systems negative    CONSTITUTIONAL: No fever, weight loss, or fatigue  NECK: No pain or stiffness  RESPIRATORY: No cough, No shortness of breath  CARDIOVASCULAR: No chest pain, palpitations, dizziness, or leg swelling  GASTROINTESTINAL: No abdominal or epigastric pain. No nausea, vomiting, or hematemesis; No diarrhea or constipation. No melena or hematochezia.  GENITOURINARY: No dysuria,  NEUROLOGICAL: No headaches, memory loss, loss of strength, numbness, or tremors  SKIN: No itching, rashes, or lesions     PHYSICAL EXAM:    GENERAL: NAD, well-groomed, well-developed  ENMT: Moist mucous membranes, No lesions  NECK: Supple, No JVD, Normal thyroid  CHEST/LUNG: Clear to auscultation bilaterally; No rales, rhonchi, wheezing, or rubs  HEART: Regular rate and rhythm; No murmurs, rubs, or gallops  ABDOMEN: Soft, Nontender, Nondistended; Bowel sounds present  EXTREMITIES:  2+ Peripheral Pulses, No clubbing, cyanosis, or edema; R BKA, L AKA.   Skin: stage 2 sacral wound  NERVOUS SYSTEM:  Awake and alert, answering questions appropriately, moving all extremities.    ICU Vital Signs Last 24 Hrs  T(C): 37 (26 Jun 2023 04:49), Max: 37.4 (25 Jun 2023 20:00)  T(F): 98.6 (26 Jun 2023 04:49), Max: 99.3 (25 Jun 2023 20:00)  HR: 67 (26 Jun 2023 06:00) (64 - 76)  BP: 142/62 (26 Jun 2023 06:00) (110/71 - 146/52)  BP(mean): 86 (26 Jun 2023 06:00) (69 - 86)  ABP: --  ABP(mean): --  RR: 20 (26 Jun 2023 06:00) (0 - 23)  SpO2: 100% (26 Jun 2023 06:00) (96% - 100%)    O2 Parameters below as of 25 Jun 2023 20:00  Patient On (Oxygen Delivery Method): nasal cannula    O2 Concentration (%): 2      I&O's Detail    25 Jun 2023 07:01  -  26 Jun 2023 07:00  --------------------------------------------------------  IN:    Oral Fluid: 420 mL  Total IN: 420 mL    OUT:    Stool (mL): 0 mL  Total OUT: 0 mL    Total NET: 420 mL        MEDICATIONS  NEURO  Meds: ondansetron Injectable 4 milliGRAM(s) IV Push every 8 hours PRN Nausea and/or Vomiting    RESPIRATORY    Meds:   CARDIOVASCULAR  Meds:   GI/NUTRITION  Meds: pantoprazole    Tablet 40 milliGRAM(s) Oral before breakfast  polyethylene glycol 3350 17 Gram(s) Oral at bedtime    GENITOURINARY  Meds: calcium acetate 667 milliGRAM(s) Oral three times a day with meals    HEMATOLOGIC  Meds: aspirin  chewable 81 milliGRAM(s) Oral daily  heparin   Injectable 5000 Unit(s) SubCutaneous every 8 hours    [x] VTE Prophylaxis  INFECTIOUS DISEASES  Meds:   ENDOCRINE  CAPILLARY BLOOD GLUCOSE      POCT Blood Glucose.: 97 mg/dL (25 Jun 2023 18:33)  POCT Blood Glucose.: 100 mg/dL (25 Jun 2023 15:30)    Meds:   OTHER MEDICATIONS:  chlorhexidine 2% Cloths 1 Application(s) Topical daily  :    LABS:                        8.9    5.58  )-----------( 219      ( 26 Jun 2023 03:39 )             29.0      06-26    137  |  98  |  40<H>  ----------------------------<  83  4.7   |  31  |  5.95<H>    Ca    8.4<L>      26 Jun 2023 03:39  Phos  2.3     06-26  Mg     3.4     06-26    TPro  6.9  /  Alb  2.3<L>  /  TBili  0.4  /  DBili  x   /  AST  20  /  ALT  17  /  AlkPhos  77  06-26      Urinalysis Basic - ( 26 Jun 2023 03:39 )    Color: x / Appearance: x / SG: x / pH: x  Gluc: 83 mg/dL / Ketone: x  / Bili: x / Urobili: x   Blood: x / Protein: x / Nitrite: x   Leuk Esterase: x / RBC: x / WBC x   Sq Epi: x / Non Sq Epi: x / Bacteria: x      Culture Results:   No growth (06-23 @ 14:20)  Culture Results:   No growth to date. (06-23 @ 10:25)  Culture Results:   No growth to date. (06-23 @ 10:25)            RADIOLOGY & ADDITIONAL STUDIES:

## 2023-06-26 NOTE — CONSULT NOTE ADULT - PROBLEM SELECTOR RECOMMENDATION 3
Echo with reduced EF 40-45%, grade III diastolic dysfunction, moderate tricuspid regurgitation and moderate pHTN  was on dopamine s/p RRT for hypotension  patient with low threshold to require pressor support given HF and ESRD, limit to volume resuscitate limited

## 2023-06-26 NOTE — CONSULT NOTE ADULT - NS ATTEND AMEND GEN_ALL_CORE FT
68 y M w multiple comorbidities incl ESRD on HD, PAD s/p R BKA, L AKA, on home O2, LTC resident, adm for AMS, course c/b septic shock, treated for suspected UTI, off pressor support, CT head showed no acute changes. Echo w EF 40-45%, mod pulm HTN. s/p thoracentesis for large L pl effusion. Mental status waxes and wanes. At risk for further complications. Niece assists w medical discussions, [place]. Remains full code at present per family wishes. Palliative team remains available as needed.

## 2023-06-26 NOTE — CONSULT NOTE ADULT - CONVERSATION DETAILS
Spoke with patient who is selective in his participation with conversation often saying he will answer later and unable to assess orientation fully, but does appear to lack insight for medical decision making at this time.  He said his niece, Ramonita would be his medical decision maker when asked.  Asked if he had shared any wishes regarding LST such as intubation, but he did not engage.     Spoke with Ramonita outside of the room per her request.  Patient has 3 adult children, 2 live in  and one in [facility] and per Ramonita she is the point person for decision making.  Explained that in [facility], adult children would be surrogate decision makers if there is no HCP.  Explained that patient high risk for further decline and death given his comorbid conditions and poor functional status.  She verbalized understanding.  Explained that should patient's heart stop, interventions such as CPR would likely not benefit him given his comorbid conditions and poor functional status and only prolong suffering.  She said she understands medically, but they have home and would want to pursue all LST in order to try and prolong his life.      Asked patient about completing HCP, but he closed his eyes.  Encouraged family discuss GOC and update family members.

## 2023-06-26 NOTE — PROGRESS NOTE ADULT - ASSESSMENT
67 yo M with ESRD on HD, functional quadriplegia (R sided BKA + L sided AKA,) blindness, CAD, HTN, HLD, CVA, on 2L home O2 a/w  AMS, being treated for UTI. RRT called for hypotension and HR to 40s. EKG showed slow junctional rhythm; pt received 500cc bolus, started on norepi and transferred to ICU. Dopamine added for bradycardia; s/p thoracentesis. Now off all pressors and improved overall.     Neuro - Noted to have encephalopathy event on 6/24, that has resolved, was awake and talking when niece came yesterday and answered questions today; spot EEG shows no seizures, will get 24 hr EEG to ensure no seizures; pending MRI, baseline AAOx1-2 at baseline, continue to monitor  CV - Bradycardia and hypotension, now off norepi and dopamine and able to maintain BP and HR; TTE performed showing EF 40-45% w/ grade 3 diastolic dysfunction moderate pulm HTN, no percardial effusion; Likely sepsis induced; continue to hold BP Meds for now  - will continue to observe on tele  Pulm - Pleural effusion on L - s/p thoracentesis w/ removal of 1.5 L pleural fluid, appears transudative based on light's criteria  GI- started on puree diet, poor PO intake; switch to PO protonix  Renal/metabolic - ESRD on HD, tolerated HD on Friday, pending HD today  ID- completed 5 days of zosyn for possible UTI; all cx including pleural fluid cx remain NGTD  Heme - chronic anemia, stable  Endo - FS q6 while NPO  PPx - HSQ q8  Dispo- stable for transfer to tele after HD.   69 yo M with ESRD on HD, functional quadriplegia (R sided BKA + L sided AKA,) blindness, CAD, HTN, HLD, CVA, on 2L home O2 a/w  AMS, being treated for UTI. RRT called for hypotension and HR to 40s. EKG showed slow junctional rhythm; pt received 500cc bolus, started on norepi and transferred to ICU. Dopamine added for bradycardia; s/p thoracentesis. Now off all pressors and improved overall.     Neuro - Noted to have encephalopathy event on 6/24, that has resolved, was awake and talking when niece came yesterday and answered questions today; spot EEG shows no seizures, will get 24 hr EEG to ensure no seizures; pending MRI, baseline AAOx1-2 at baseline, continue to monitor  CV - Bradycardia and hypotension, now off norepi and dopamine and able to maintain BP and HR; TTE performed showing EF 40-45% w/ grade 3 diastolic dysfunction moderate pulm HTN, no percardial effusion; Likely sepsis induced; continue to hold BP Meds for now  - will continue to observe on tele  Pulm - Pleural effusion on L - s/p thoracentesis w/ removal of 1.5 L pleural fluid, appears transudative based on light's criteria  GI- started on puree diet, poor PO intake; switch to PO protonix  Renal/metabolic - ESRD on HD, tolerated HD on Friday, pending HD today  ID- completed 5 days of zosyn for possible UTI; all cx including pleural fluid cx remain NGTD  Heme - chronic anemia, stable  Endo - FS q6 while NPO  PPx - HSQ q8  Dispo- stable for transfer to tele.

## 2023-06-26 NOTE — CONSULT NOTE ADULT - SUBJECTIVE AND OBJECTIVE BOX
HPI:  Patient is a 68M with a PMH of ESRD on HD MWF, functional quadriplegia (R sided BKA + L sided AKA,) blindness, CAD, HTN, HLD, on 2L home O2 who presents to the ED for AMS.  Patient currently AAOx1, unable to provide history.  Per ED staff, patient was sent to the ED from his HD center earlier today.  Patient reportedly became confused and combative with staff - who were unable to start HD.  ED also reached out to family members who stated that the patient currently lives in a rehab center.  Was noted to be restless yesterday evening.  Vitals stable, labs show elevated troponin levels.  Will admit to tele.     (21 Jun 2023 21:07)    PERTINENT PM/SXH:   DM (diabetes mellitus)    HTN (hypertension)    Kidney disease    Below knee amputation status, right    CKD (chronic kidney disease)    MRSA (methicillin resistant Staphylococcus aureus) colonization    Wound    No pertinent past medical history    DM (diabetes mellitus)    HTN (hypertension)    Kidney disease      No significant past surgical history    S/P BKA (below knee amputation) unilateral, right    H/O peripheral artery bypass    No significant past surgical history    Amputated left leg    Amputated right leg      FAMILY HISTORY:  Family history of diabetes mellitus      ITEMS NOT CHECKED ARE NOT PRESENT    SOCIAL HISTORY:   Significant other/partner:  [ ]  Children: yes [ ]  Scientologist/Spirituality: Yarsani  Substance hx:  [ ]   Tobacco hx:  [ ]   Alcohol hx: [ ]   Home Opioid hx:  [ ] I-Stop Reference No: Reference #: [id number]  Living Situation: [ ]Home  [x]Long term care  [ ]Rehab [ ]Other    ADVANCE DIRECTIVES:    DNR  MOLST  [ ]  Living Will  [ ]   DECISION MAKER(s):  [ ] Health Care Proxy(s)  [x ] Surrogate(s)  [ ] Guardian           Name(s): Phone Number(s): Ramonita Perezarez [phone]    BASELINE (I)ADL(s) (prior to admission):  [place]: [ ]Total  [ ] Moderate [x ]Dependent    Allergies    No Known Allergies    Intolerances    MEDICATIONS  (STANDING):  aspirin  chewable 81 milliGRAM(s) Oral daily  atorvastatin 40 milliGRAM(s) Oral at bedtime  calcium acetate 667 milliGRAM(s) Oral three times a day with meals  chlorhexidine 2% Cloths 1 Application(s) Topical daily  heparin   Injectable 5000 Unit(s) SubCutaneous every 8 hours  pantoprazole    Tablet 40 milliGRAM(s) Oral before breakfast  polyethylene glycol 3350 17 Gram(s) Oral at bedtime    MEDICATIONS  (PRN):  ondansetron Injectable 4 milliGRAM(s) IV Push every 8 hours PRN Nausea and/or Vomiting    PRESENT SYMPTOMS: [ x]Unable to obtain due to poor mentation   Source if other than patient:  [ ]Family   [ ]Team     Pain: [ ] yes [x ] no  QOL impact -   Location -                    Aggravating factors -  Quality -  Radiation -  Timing-  Severity (0-10 scale):  Minimal acceptable level (0-10 scale):     PAIN AD Score:     [email or web address].pdf (press ctrl +  left click to view)    Dyspnea:                           [ ]Mild [ ]Moderate [ ]Severe  Anxiety:                             [ ]Mild [ ]Moderate [ ]Severe  Fatigue:                             [ ]Mild [ ]Moderate [ ]Severe  Nausea:                             [ ]Mild [ ]Moderate [ ]Severe  Loss of appetite:              [ ]Mild [ ]Moderate [ ]Severe  Constipation:                    [ ]Mild [ ]Moderate [ ]Severe    Other Symptoms:  [ ]All other review of systems negative     Karnofsky Performance Score/Palliative Performance Status Version 2:        30 %    http://npcrc.org/files/news/palliative_performance_scale_ppsv2.pdf  PHYSICAL EXAM:  Vital Signs Last 24 Hrs  T(C): 36.4 (26 Jun 2023 10:00), Max: 37.4 (25 Jun 2023 20:00)  T(F): 97.5 (26 Jun 2023 10:00), Max: 99.3 (25 Jun 2023 20:00)  HR: 74 (26 Jun 2023 13:00) (67 - 76)  BP: 138/65 (26 Jun 2023 13:00) (116/53 - 145/71)  BP(mean): 87 (26 Jun 2023 13:00) (70 - 94)  RR: 18 (26 Jun 2023 13:00) (0 - 26)  SpO2: 100% (26 Jun 2023 13:00) (89% - 100%)    Parameters below as of 26 Jun 2023 13:00  Patient On (Oxygen Delivery Method): nasal cannula  O2 Flow (L/min): 1   I&O's Summary    25 Jun 2023 07:01  -  26 Jun 2023 07:00  --------------------------------------------------------  IN: 420 mL / OUT: 0 mL / NET: 420 mL    GENERAL:  [ ]Alert  [ ]Oriented x   [x ]Lethargic  [ ]Cachexia  [ ]Unarousable  [x ]Verbal  [ ]Non-Verbal  Behavioral:   [ ] Anxiety  [ ] Delirium [ ] Agitation [ ] Other  HEENT:  [ x]Normal   [ ]Dry mouth   [ ]ET Tube/Trach  [ ]Oral lesions  PULMONARY:   [ ]Clear [ ]Tachypnea  [ ]Audible excessive secretions   [ ]Rhonchi        [ ]Right [ ]Left [ ]Bilateral  [ ]Crackles        [ ]Right [ ]Left [ ]Bilateral  [ ]Wheezing     [ ]Right [ ]Left [ ]Bilateral  diminished breath sounds bilaterally   CARDIOVASCULAR:    [ x]Regular [ ]Irregular [ ]Tachy  [ ]Dimitri [ ]Murmur [ ]Other  GASTROINTESTINAL:  [x ]Soft  [ ]Distended   [ ]+BS  [x ]Non tender [ ]Tender  [ ]PEG [ ]OGT/ NGT  Last BM: 6/24/23 GENITOURINARY:   [ ]Normal [ ] Incontinent   [x ]Oliguria/Anuria   [ ]Ortega  MUSCULOSKELETAL:   [ ]Normal   [ ]Weakness  [x ]Bed/Wheelchair bound [ ]Edema  NEUROLOGIC:   [ ]No focal deficits  [x ] Cognitive impairment  [ ] Dysphagia [ ]Dysarthria [ ] Paresis [ ]Other   SKIN:   [ ]Normal   [ x]Pressure ulcer(s) sacrum stage II  [ ]Rash    CRITICAL CARE:  [ ] Shock Present  [ ]Septic [ ]Cardiogenic [ ]Neurologic [ ]Hypovolemic  [ ]  Vasopressors [ ]  Inotropes   [ ] Respiratory failure present [ ] mechanical ventilation [ ] non-invasive ventilatory support [ ] High flow  [ ] Acute  [ ] Chronic [ ] Hypoxic  [ ] Hypercarbic [ ] Other  [ ] Other organ failure     LABS:                        8.9    5.58  )-----------( 219      ( 26 Jun 2023 03:39 )             29.0   06-26    137  |  98  |  40<H>  ----------------------------<  83  4.7   |  31  |  5.95<H>    Ca    8.4<L>      26 Jun 2023 03:39  Phos  2.3     06-26  Mg     3.4     06-26    TPro  6.9  /  Alb  2.3<L>  /  TBili  0.4  /  DBili  x   /  AST  20  /  ALT  17  /  AlkPhos  77  06-26      Urinalysis Basic - ( 26 Jun 2023 03:39 )    Color: x / Appearance: x / SG: x / pH: x  Gluc: 83 mg/dL / Ketone: x  / Bili: x / Urobili: x   Blood: x / Protein: x / Nitrite: x   Leuk Esterase: x / RBC: x / WBC x   Sq Epi: x / Non Sq Epi: x / Bacteria: x    Culture - Urine (06.21.23 @ 20:12)    Specimen Source: Clean Catch Clean Catch (Midstream)   Culture Results:   No growth    Culture - Blood (06.23.23 @ 10:25)    Specimen Source: .Blood Blood   Culture Results:   No growth to date.    Culture - Body Fluid with Gram Stain (06.23.23 @ 14:20)    Gram Stain:   polymorphonuclear leukocytes seen  No organisms seen  by cytocentrifuge   Specimen Source: Pleural Fl Pleural Fluid   Culture Results:   No growth    Culture - Fungal, Body Fluid (06.23.23 @ 14:20)    Specimen Source: Pleural Fl Pleural Fluid   Culture Results:   Testing in progress    RADIOLOGY & ADDITIONAL STUDIES:   < from: Xray Chest 1 View- PORTABLE-Urgent (Xray Chest 1 View- PORTABLE-Urgent .) (06.23.23 @ 14:59) >  On June 23 earlier study there was a moderate loculated left effusion   laterally. This is markedly diminished with minimal residual after   thoracentesis. No pneumothorax.  IMPRESSION: Markedly diminished left effusion after thoracentesis.  --- End of Report ---  < end of copied text >    < from: CT Head No Cont (06.21.23 @ 18:46) >  IMPRESSION:  1. Old bilateral posterior circulation ischemic events, stable in   appearance compared with the prior.  2. Atherosclerotic changes.  3. No acute intracranial process. Specifically, no acute intracranial   hemorrhage. No large arterial distribution acute infarct. If altered   mental status persists, consider further evaluation via MR imaging to   include DWI and ADC mapping techniques, provided there are no   contraindications.  --- End of Report ---  < end of copied text >    < from: TTE Echo Complete w/o Contrast w/ Doppler (06.23.23 @ 10:32) >  Summary:   1. Mildly decreased global left ventricular systolic function.   2. Left ventricular ejection fraction, by visual estimation, is 40 to   45%.   3. Spectral Doppler shows restrictive pattern of left ventricular   myocardial filling (Grade III diastolic dysfunction).   4. Mild to moderately enlarged left atrium.   5. Structurally normal mitral valve, with normal leaflet excursion.   6. Mild mitral valve regurgitation.   7. Mild aortic valve stenosis.   8. Mildly reduced RV systolic function.   9. Structurally normal tricuspid valve, with normal leaflet excursion.  10. Moderate tricuspid regurgitation.  11. Structurally normal pulmonic valve, with normal leaflet excursion.  12. Estimated pulmonary artery systolic pressure is 56.1 mmHg assuming a   right atrial pressure of 5 mmHg, which is consistent with moderate   pulmonary hypertension.  13. Large pleural effusion in the left lateral region.  < end of copied text >    EEG  Clinical Impression:  -Mild diffuse cerebral dysfunction is nonspecific in etiology.     PROTEIN CALORIE MALNUTRITION PRESENT: [ x] Yes [ ] No  [x ] PPSV2 < or = to 30% [ ] significant weight loss  [ x] poor nutritional intake [ ] catabolic state [ ] anasarca     Artificial Nutrition [ ]     REFERRALS:   [ ]Chaplaincy  [ ] Hospice  [ ]Child Life  [ ]Social Work  [ ]Case management [ ]Holistic Therapy     Goals of Care Document:

## 2023-06-26 NOTE — PROGRESS NOTE ADULT - ASSESSMENT
ESRD:  On HD at [facility].  - HD tomorrow.  - Renal diet.    Hypertension:  - Start ARB.  - Follow up BM.    Anemia  - TAINA at HD.    Acute Mental Status Change:  CVA vs Sepsis.  - Care as per Medicine.

## 2023-06-27 LAB
ALBUMIN SERPL ELPH-MCNC: 2.4 G/DL — LOW (ref 3.3–5)
ALP SERPL-CCNC: 79 U/L — SIGNIFICANT CHANGE UP (ref 40–120)
ALT FLD-CCNC: 16 U/L — SIGNIFICANT CHANGE UP (ref 12–78)
ANION GAP SERPL CALC-SCNC: 8 MMOL/L — SIGNIFICANT CHANGE UP (ref 5–17)
AST SERPL-CCNC: 16 U/L — SIGNIFICANT CHANGE UP (ref 15–37)
BASOPHILS # BLD AUTO: 0.08 K/UL — SIGNIFICANT CHANGE UP (ref 0–0.2)
BASOPHILS NFR BLD AUTO: 1.5 % — SIGNIFICANT CHANGE UP (ref 0–2)
BILIRUB SERPL-MCNC: 0.5 MG/DL — SIGNIFICANT CHANGE UP (ref 0.2–1.2)
BUN SERPL-MCNC: 48 MG/DL — HIGH (ref 7–23)
CALCIUM SERPL-MCNC: 9.2 MG/DL — SIGNIFICANT CHANGE UP (ref 8.5–10.1)
CHLORIDE SERPL-SCNC: 98 MMOL/L — SIGNIFICANT CHANGE UP (ref 96–108)
CO2 SERPL-SCNC: 32 MMOL/L — HIGH (ref 22–31)
CREAT SERPL-MCNC: 7.03 MG/DL — HIGH (ref 0.5–1.3)
CULTURE RESULTS: SIGNIFICANT CHANGE UP
CULTURE RESULTS: SIGNIFICANT CHANGE UP
EGFR: 8 ML/MIN/1.73M2 — LOW
EOSINOPHIL # BLD AUTO: 0.82 K/UL — HIGH (ref 0–0.5)
EOSINOPHIL NFR BLD AUTO: 15.8 % — HIGH (ref 0–6)
GLUCOSE SERPL-MCNC: 72 MG/DL — SIGNIFICANT CHANGE UP (ref 70–99)
HCT VFR BLD CALC: 29.4 % — LOW (ref 39–50)
HGB BLD-MCNC: 9.2 G/DL — LOW (ref 13–17)
IMM GRANULOCYTES NFR BLD AUTO: 0.6 % — SIGNIFICANT CHANGE UP (ref 0–0.9)
LYMPHOCYTES # BLD AUTO: 0.84 K/UL — LOW (ref 1–3.3)
LYMPHOCYTES # BLD AUTO: 16.2 % — SIGNIFICANT CHANGE UP (ref 13–44)
MAGNESIUM SERPL-MCNC: 3.5 MG/DL — HIGH (ref 1.6–2.6)
MCHC RBC-ENTMCNC: 28.8 PG — SIGNIFICANT CHANGE UP (ref 27–34)
MCHC RBC-ENTMCNC: 31.3 G/DL — LOW (ref 32–36)
MCV RBC AUTO: 92.2 FL — SIGNIFICANT CHANGE UP (ref 80–100)
MONOCYTES # BLD AUTO: 0.48 K/UL — SIGNIFICANT CHANGE UP (ref 0–0.9)
MONOCYTES NFR BLD AUTO: 9.2 % — SIGNIFICANT CHANGE UP (ref 2–14)
NEUTROPHILS # BLD AUTO: 2.94 K/UL — SIGNIFICANT CHANGE UP (ref 1.8–7.4)
NEUTROPHILS NFR BLD AUTO: 56.7 % — SIGNIFICANT CHANGE UP (ref 43–77)
NRBC # BLD: 0 /100 WBCS — SIGNIFICANT CHANGE UP (ref 0–0)
PHOSPHATE SERPL-MCNC: 2.6 MG/DL — SIGNIFICANT CHANGE UP (ref 2.5–4.5)
PLATELET # BLD AUTO: 237 K/UL — SIGNIFICANT CHANGE UP (ref 150–400)
POTASSIUM SERPL-MCNC: 4.9 MMOL/L — SIGNIFICANT CHANGE UP (ref 3.5–5.3)
POTASSIUM SERPL-SCNC: 4.9 MMOL/L — SIGNIFICANT CHANGE UP (ref 3.5–5.3)
PROT SERPL-MCNC: 7 GM/DL — SIGNIFICANT CHANGE UP (ref 6–8.3)
RBC # BLD: 3.19 M/UL — LOW (ref 4.2–5.8)
RBC # FLD: 19.4 % — HIGH (ref 10.3–14.5)
SODIUM SERPL-SCNC: 138 MMOL/L — SIGNIFICANT CHANGE UP (ref 135–145)
SPECIMEN SOURCE: SIGNIFICANT CHANGE UP
SPECIMEN SOURCE: SIGNIFICANT CHANGE UP
WBC # BLD: 5.19 K/UL — SIGNIFICANT CHANGE UP (ref 3.8–10.5)
WBC # FLD AUTO: 5.19 K/UL — SIGNIFICANT CHANGE UP (ref 3.8–10.5)

## 2023-06-27 PROCEDURE — 70551 MRI BRAIN STEM W/O DYE: CPT | Mod: 26

## 2023-06-27 PROCEDURE — 99497 ADVNCD CARE PLAN 30 MIN: CPT | Mod: 25

## 2023-06-27 PROCEDURE — 99223 1ST HOSP IP/OBS HIGH 75: CPT

## 2023-06-27 RX ADMIN — Medication 667 MILLIGRAM(S): at 12:56

## 2023-06-27 RX ADMIN — Medication 667 MILLIGRAM(S): at 17:25

## 2023-06-27 RX ADMIN — Medication 81 MILLIGRAM(S): at 12:56

## 2023-06-27 RX ADMIN — HEPARIN SODIUM 5000 UNIT(S): 5000 INJECTION INTRAVENOUS; SUBCUTANEOUS at 13:48

## 2023-06-27 RX ADMIN — POLYETHYLENE GLYCOL 3350 17 GRAM(S): 17 POWDER, FOR SOLUTION ORAL at 21:33

## 2023-06-27 RX ADMIN — CHLORHEXIDINE GLUCONATE 1 APPLICATION(S): 213 SOLUTION TOPICAL at 12:56

## 2023-06-27 RX ADMIN — Medication 667 MILLIGRAM(S): at 08:55

## 2023-06-27 RX ADMIN — PANTOPRAZOLE SODIUM 40 MILLIGRAM(S): 20 TABLET, DELAYED RELEASE ORAL at 06:34

## 2023-06-27 RX ADMIN — HEPARIN SODIUM 5000 UNIT(S): 5000 INJECTION INTRAVENOUS; SUBCUTANEOUS at 21:33

## 2023-06-27 RX ADMIN — ATORVASTATIN CALCIUM 40 MILLIGRAM(S): 80 TABLET, FILM COATED ORAL at 21:33

## 2023-06-27 RX ADMIN — HEPARIN SODIUM 5000 UNIT(S): 5000 INJECTION INTRAVENOUS; SUBCUTANEOUS at 06:34

## 2023-06-27 NOTE — CHART NOTE - NSCHARTNOTEFT_GEN_A_CORE
Palliative to sign off at this time as GOC are clear at this time.  Please reconsult should GOC change or symptoms arise.  Thank you.

## 2023-06-28 ENCOUNTER — TRANSCRIPTION ENCOUNTER (OUTPATIENT)
Age: 68
End: 2023-06-28

## 2023-06-28 VITALS
DIASTOLIC BLOOD PRESSURE: 70 MMHG | HEART RATE: 72 BPM | OXYGEN SATURATION: 94 % | TEMPERATURE: 98 F | RESPIRATION RATE: 17 BRPM | SYSTOLIC BLOOD PRESSURE: 149 MMHG

## 2023-06-28 LAB
CULTURE RESULTS: SIGNIFICANT CHANGE UP
FLUAV AG NPH QL: SIGNIFICANT CHANGE UP
FLUBV AG NPH QL: SIGNIFICANT CHANGE UP
GRAM STN FLD: SIGNIFICANT CHANGE UP
SARS-COV-2 RNA SPEC QL NAA+PROBE: SIGNIFICANT CHANGE UP
SPECIMEN SOURCE: SIGNIFICANT CHANGE UP

## 2023-06-28 PROCEDURE — 99239 HOSP IP/OBS DSCHRG MGMT >30: CPT

## 2023-06-28 RX ADMIN — Medication 667 MILLIGRAM(S): at 12:10

## 2023-06-28 RX ADMIN — HEPARIN SODIUM 5000 UNIT(S): 5000 INJECTION INTRAVENOUS; SUBCUTANEOUS at 12:11

## 2023-06-28 RX ADMIN — Medication 667 MILLIGRAM(S): at 16:53

## 2023-06-28 RX ADMIN — PANTOPRAZOLE SODIUM 40 MILLIGRAM(S): 20 TABLET, DELAYED RELEASE ORAL at 05:22

## 2023-06-28 RX ADMIN — Medication 81 MILLIGRAM(S): at 12:10

## 2023-06-28 RX ADMIN — HEPARIN SODIUM 5000 UNIT(S): 5000 INJECTION INTRAVENOUS; SUBCUTANEOUS at 05:22

## 2023-06-28 NOTE — DISCHARGE NOTE NURSING/CASE MANAGEMENT/SOCIAL WORK - NSDCPEFALRISK_GEN_ALL_CORE
For information on Fall & Injury Prevention, visit: [email or web address] OR  [email or web address] OR  https://www.cdc.gov/steadi/patient.html

## 2023-06-28 NOTE — DISCHARGE NOTE PROVIDER - NSDCMRMEDTOKEN_GEN_ALL_CORE_FT
aspirin 81 mg oral delayed release tablet: 1 tab(s) orally once a day  atorvastatin 80 mg oral tablet: 1 tab(s) orally once a day (at bedtime)  calcium acetate 667 mg oral tablet: 1 tab(s) orally 3 times a day  labetalol 200 mg oral tablet: 1 tab(s) orally 3 times a day(6am, 2pm,10pm)  lactulose 10 g/15 mL oral syrup: 30 milliliter(s) orally once a day (at bedtime)  latanoprost 0.005% ophthalmic solution: 1 drop(s) to each affected eye once a day (in the evening)  NIFEdipine 60 mg oral tablet, extended release: 1 tab(s) orally once a day (at bedtime)  pantoprazole 40 mg oral delayed release tablet: 1 tab(s) orally once a day  Pred Forte 1% ophthalmic suspension: 1 drop(s) to each lt eye 2 times a day  senna oral tablet: 2 tab(s) orally once a day (at bedtime)

## 2023-06-28 NOTE — PROGRESS NOTE ADULT - OPHTHALMOLOGIC
Note to reader: this note follows the APSO format rather than the historical SOAP format. Assessment and plan located at the top of the note for ease of use.    Chief Complaint  14 y.o. year old female here with left ureteral stones    Assessment/Plan  Interval History   Left ureteral and renal stones s/p CULTS 8/19/19    OK for DC from  standpoint  Stent on string to remain  Plan stent removal in office in 1 week    Case discussed with patient, RN and Urology-Dr. Dalia OSORIO  Patient seen and examined    8/20. POD #1. Doing well. Has urgency and hematuria. Minimal pain.          Review of Systems  Physical Exam   Review of Systems   Constitutional: Negative for fever.   Genitourinary: Positive for frequency and urgency. Negative for flank pain.     Vitals:    08/20/19 0150 08/20/19 0220 08/20/19 0300 08/20/19 0748   BP: 101/60 102/62 (!) 90/50    Pulse: 65 64 (!) 50 (!) 49   Resp: 18 16 16 16   Temp: 36.3 °C (97.4 °F) 36.2 °C (97.2 °F) 36.4 °C (97.5 °F) 37 °C (98.6 °F)   TempSrc: Temporal Temporal Temporal Temporal   SpO2: 97% 94% 94% 97%   Weight:       Height:         Physical Exam   Constitutional: She is oriented to person, place, and time. She appears distressed.   Pulmonary/Chest: Effort normal.   Neurological: She is alert and oriented to person, place, and time.   Skin: Skin is warm and dry.   Psychiatric: Affect and judgment normal.   Nursing note and vitals reviewed.       Hematology Chemistry   Lab Results   Component Value Date/Time    WBC 12.9 (H) 08/19/2019 05:32 PM    HEMOGLOBIN 13.4 08/19/2019 05:32 PM    HEMATOCRIT 40.1 08/19/2019 05:32 PM    PLATELETCT 242 08/19/2019 05:32 PM     Lab Results   Component Value Date/Time    SODIUM 136 08/19/2019 05:32 PM    POTASSIUM 3.9 08/19/2019 05:32 PM    CHLORIDE 108 08/19/2019 05:32 PM    CO2 17 (L) 08/19/2019 05:32 PM    GLUCOSE 91 08/19/2019 05:32 PM    BUN 15 08/19/2019 05:32 PM    CREATININE 0.93 08/19/2019 05:32 PM         Labs not explicitly 
included in this progress note were reviewed by the author.   Radiology/imaging not explicitly included in this progress note was reviewed by the author.     Medications reviewed, Labs reviewed and Radiology images reviewed                     
negative
negative

## 2023-06-28 NOTE — DISCHARGE NOTE PROVIDER - HOSPITAL COURSE
68M with a PMH of ESRD on HD MWF, functional quadriplegia (R sided BKA + L sided AKA,) blindness, CAD, HTN, HLD, on 2L home O2 who presents to the ED for AMS.  Patient currently AAOx1, unable to provide history.  Per ED staff, patient was sent to the ED from his HD center earlier today.  Patient reportedly became confused and combative with staff - who were unable to start HD.   Admitted to tele. On 6/23 Pt was RRT  for hypotension and bradycardia with HR in 40's with vomiting episode. Junctional rhythm noted and pt started initially on norepi gtt which has been transitioned to dopamine gtt. Large left pleural effusion noted with 1.5 liters removed via thoracentesis. Fluid was transudative. Pt O2 requirements improved to 1LNC. Pt then came off all pressors. Pt has since been HD stable. Treated for sepsis of unknown origin, s/p course of zosyn. Cultures negative.  Pt had brief episode of unresponsiveness on 6/23. Spot EEG negative. MR without acute findings.  Transferred from ICU 6/26, stable.    Stable for discharge per attending Sushant 6/28   68M with a PMH of ESRD on HD MWF, functional quadriplegia (R sided BKA + L sided AKA,) blindness, CAD, HTN, HLD, on 2L home O2 who presents to the ED for AMS.  Patient currently AAOx1, unable to provide history.  Per ED staff, patient was sent to the ED from his HD center earlier today.  Patient reportedly became confused and combative with staff - who were unable to start HD.   Admitted to tele. On 6/23 Pt was RRT  for hypotension and bradycardia with HR in 40's with vomiting episode. Junctional rhythm noted and pt started initially on norepi gtt which has been transitioned to dopamine gtt. Large left pleural effusion noted with 1.5 liters removed via thoracentesis. Fluid was transudative. Pt O2 requirements improved to 1LNC. Pt then came off all pressors. Pt has since been HD stable. Treated for sepsis of unknown origin, s/p course of zosyn. Cultures negative.  Pt had brief episode of unresponsiveness on 6/23. Spot EEG negative. MR without acute findings.  Transferred from ICU 6/26, stable.  Last HD done 6/27    Stable for discharge per attending Sushant 6/28

## 2023-06-28 NOTE — PROGRESS NOTE ADULT - SUBJECTIVE AND OBJECTIVE BOX
JAMES PATRICK  68y  Patient is a 68y old  Male who presents with a chief complaint of AMS (2023 09:58)    HPI:  Seen and examined.  ESRD on HD, no new issues.    HEALTH ISSUES - PROBLEM Dx:  Metabolic encephalopathy    ESRD on dialysis    Essential hypertension    Hyperlipidemia    CAD (coronary artery disease)    Troponin level elevated    Pleural effusion    Functional quadriplegia    Encounter for palliative care    Heart failure          MEDICATIONS  (STANDING):  aspirin  chewable 81 milliGRAM(s) Oral daily  atorvastatin 40 milliGRAM(s) Oral at bedtime  calcium acetate 667 milliGRAM(s) Oral three times a day with meals  heparin   Injectable 5000 Unit(s) SubCutaneous every 8 hours  pantoprazole    Tablet 40 milliGRAM(s) Oral before breakfast  polyethylene glycol 3350 17 Gram(s) Oral at bedtime    MEDICATIONS  (PRN):  ondansetron Injectable 4 milliGRAM(s) IV Push every 8 hours PRN Nausea and/or Vomiting    Vital Signs Last 24 Hrs  T(C): 36.9 (2023 10:37), Max: 37.2 (2023 17:51)  T(F): 98.4 (2023 10:37), Max: 98.9 (2023 17:51)  HR: 79 (2023 10:37) (68 - 79)  BP: 171/76 (2023 10:37) (131/61 - 171/76)  BP(mean): 95 (2023 17:51) (82 - 95)  RR: 18 (2023 10:37) (16 - 19)  SpO2: 93% (2023 10:37) (93% - 100%)    Parameters below as of 2023 10:37  Patient On (Oxygen Delivery Method): room air      Daily     Daily Weight in k.5 (2023 05:00)    PHYSICAL EXAM:  Constitutional: She appears comfortable and not distressed. Not diaphoretic.    Neck:  The thyroid is normal. Trachea is midline.     Respiratory: The lungs are clear to auscultation. No dullness and expansion is normal.    Cardiovascular: S1 and S2 are normal. No murmurs, rubs or gallops are present.    Gastrointestinal: The abdomen is soft. No tenderness is present. No masses are present.     Genitourinary: The bladder is not distended. No CVA tenderness is present.    Extremities: No edema is noted. No deformities are present.    Neurological: Cognition is normal. Tone, power and sensation are normal.    Skin: No lesions are seen  or palpated.    Lymph Nodes: No lymphadenopathy is present.                              9.2    5.19  )-----------( 237      ( 2023 02:55 )             29.4         138  |  98  |  48<H>  ----------------------------<  72  4.9   |  32<H>  |  7.03<H>    Ca    9.2      2023 02:55  Phos  2.6       Mg     3.5         TPro  7.0  /  Alb  2.4<L>  /  TBili  0.5  /  DBili  x   /  AST  16  /  ALT  16  /  AlkPhos  79      Urinalysis Basic - ( 2023 02:55 )    Color: x / Appearance: x / SG: x / pH: x  Gluc: 72 mg/dL / Ketone: x  / Bili: x / Urobili: x   Blood: x / Protein: x / Nitrite: x   Leuk Esterase: x / RBC: x / WBC x   Sq Epi: x / Non Sq Epi: x / Bacteria: x

## 2023-06-28 NOTE — PROGRESS NOTE ADULT - ASSESSMENT
68M with ESRD on HD MWF, s/p R BKA, L AKA blindness, HTN, HLD, pleural effusion, who presents to the ED for AMS.    Treated for presumed UTI, had acute hypotension requiring pressor support and developed intermittent junctional rhythm (qrs remains narrow., HR in 40s) now off pressors. HRs have normalized. Acute sepsis related?    TTE showed mildly decreased LVEF, with grade III diastolic dysfunction, mod TR, mod pulm HTN.  s/p thoracentesis.  pt currently awake and able to make his needs known  remains hemodynamically stable  restart BP meds  optimize GDMT for cardiomyopathy, consider adding ARB if ok with renal   HD as per renal  No further dysrhythmia  outpatient cardiology follow up within 2 weeks

## 2023-06-28 NOTE — DISCHARGE NOTE NURSING/CASE MANAGEMENT/SOCIAL WORK - NSDCVIVACCINE_GEN_ALL_CORE_FT
Tdap; 15-Aug-2017 22:58; Shalom Stanley); [employer]; [id number]; IntraMuscular; Deltoid Left.; 0.5 milliLiter(s); VIS (VIS Published: 09-May-2013, VIS Presented: 15-Aug-2017);

## 2023-06-28 NOTE — PROGRESS NOTE ADULT - ENDOCRINE
How Severe Are Your Spot(S)?: mild
What Is The Reason For Today's Visit?: Full Body Skin Examination
What Is The Reason For Today's Visit? (Being Monitored For X): the development of a new lesion
negative
negative

## 2023-06-28 NOTE — PROGRESS NOTE ADULT - ASSESSMENT
ESRD:  On HD at [facility].  - HD as scheduled.  - Renal diet.    Hypertension:  - Start ARB.  - Follow up BM.    Anemia  - TAINA at HD.    Acute Mental Status Change:  CVA vs Sepsis.  - Care as per Medicine.

## 2023-06-28 NOTE — PROGRESS NOTE ADULT - PROVIDER SPECIALTY LIST ADULT
Critical Care
Critical Care
Cardiology
Nephrology
Critical Care
Critical Care
Cardiology
Hospitalist
Nephrology
Nephrology

## 2023-06-28 NOTE — PROGRESS NOTE ADULT - SUBJECTIVE AND OBJECTIVE BOX
Patient is a 68y old  Male who presents with AMS (28 Jun 2023 11:36)    PAST MEDICAL & SURGICAL HISTORY:  DM (diabetes mellitus)    HTN (hypertension)    ESRD on HD    CVA    pleural effusion     S/P BKA (below knee amputation) unilateral, right    H/O peripheral artery bypass  left fem to below-knee pop with RSVG    Amputated left leg above knee    Lt UE AVG     INTERVAL HISTORY: in no acute distress, denies any chest pain or sob, knows he is in the hospital, does not recall why he is in the hospital, able to express his needs   	  MEDICATIONS:  MEDICATIONS  (STANDING):  aspirin  chewable 81 milliGRAM(s) Oral daily  atorvastatin 40 milliGRAM(s) Oral at bedtime  calcium acetate 667 milliGRAM(s) Oral three times a day with meals  heparin   Injectable 5000 Unit(s) SubCutaneous every 8 hours  pantoprazole    Tablet 40 milliGRAM(s) Oral before breakfast  polyethylene glycol 3350 17 Gram(s) Oral at bedtime    MEDICATIONS  (PRN):  ondansetron Injectable 4 milliGRAM(s) IV Push every 8 hours PRN Nausea and/or Vomiting    Vitals:  T(F): 98.4 (06-28-23 @ 10:37), Max: 98.9 (06-27-23 @ 17:51)  HR: 79 (06-28-23 @ 10:37) (68 - 79)  BP: 171/76 (06-28-23 @ 10:37) (131/61 - 171/76)  RR: 18 (06-28-23 @ 10:37) (16 - 19)  SpO2: 93% (06-28-23 @ 10:37) (93% - 100%)    06-27 @ 07:01  -  06-28 @ 07:00  --------------------------------------------------------  IN:    Other (mL): 800 mL  Total IN: 800 mL    OUT:    Other (mL): 2300 mL  Total OUT: 2300 mL    Total NET: -1500 mL    06-28 @ 07:01  -  06-28 @ 13:44  --------------------------------------------------------  IN:    Oral Fluid: 120 mL  Total IN: 120 mL    OUT:  Total OUT: 0 mL    Total NET: 120 mL    PHYSICAL EXAM:  Neuro: Awake, responsive, blind  CV: S1 S2 RRR + SM  Lungs: diminished to bases   GI: Soft, BS +, ND, NT  Extremities: Rt BKA, Lt AKA    TELEMETRY: sinus     RADIOLOGY: < from: MR Head No Cont (06.27.23 @ 10:10) >  IMPRESSION: Old infarcts    < end of copied text >    < from: Xray Chest 1 View- PORTABLE-Urgent (Xray Chest 1 View- PORTABLE-Urgent .) (06.23.23 @ 14:59) >  IMPRESSION: Markedly diminished left effusion after thoracentesis.    < end of copied text >  < from: Xray Chest 1 View-PORTABLE IMMEDIATE (Xray Chest 1 View-PORTABLE IMMEDIATE .) (06.21.23 @ 18:16) >  Fairly large loculated left effusion at this time.    < end of copied text >  DIAGNOSTIC TESTING:    [x ] Echocardiogram: < from: TTE Echo Complete w/o Contrast w/ Doppler (06.23.23 @ 10:32) >  Left Ventricle:  Global LV systolic function was mildly decreased. Left ventricular   ejection fraction, by visual estimation, is 40 to 45%. Spectral Doppler   shows restrictive pattern of left ventricular myocardial filling (Grade   III diastolic dysfunction).  Right Ventricle: RV systolic function is mildly reduced.  Left Atrium: Mild to moderately enlarged left atrium. Unable to rule out   the presence of a PFO.  Pericardium: There is no evidence of pericardial effusion. There is a   large pleural effusion in the left lateral region.  Mitral Valve: Structurally normal mitral valve, with normal leaflet   excursion. Mild mitral valve regurgitation is seen.  Tricuspid Valve: Structurally normal tricuspid valve, with normal leaflet   excursion. Moderate tricuspid regurgitation is visualized. Estimated   pulmonary artery systolic pressure is 56.1 mmHg assuming a right atrial   pressure of 5 mmHg, which is consistent with moderate pulmonary   hypertension.  Aortic Valve: The aortic valve is trileaflet. Mild aortic stenosis is   present.  Pulmonic Valve: Structurally normal pulmonic valve, with normal leaflet   excursion. Trace pulmonic valve regurgitation.  Aorta: The aortic root is normal in size and structure.  Venous: The inferior vena cava was normal sized, with respiratory size   variation greater than 50%.      Summary:   1. Mildly decreased global left ventricular systolic function.   2. Left ventricular ejection fraction, by visual estimation, is 40 to   45%.   3. Spectral Doppler shows restrictive pattern of left ventricular   myocardial filling (Grade III diastolic dysfunction).   4. Mild to moderately enlarged left atrium.   5. Structurally normal mitral valve, with normal leaflet excursion.   6. Mild mitral valve regurgitation.   7. Mild aortic valve stenosis.   8. Mildly reduced RV systolic function.   9. Structurally normal tricuspid valve, with normal leaflet excursion.  10. Moderate tricuspid regurgitation.  11. Structurally normal pulmonic valve, with normal leaflet excursion.  12. Estimated pulmonary artery systolic pressure is 56.1 mmHg assuming a   right atrial pressure of 5 mmHg, which is consistent with moderate   pulmonary hypertension.  13. Large pleural effusion in the left lateral region.    < end of copied text >    LABS:	 	    27 Jun 2023 02:55    138    |  98     |  48     ----------------------------<  72     4.9     |  32     |  7.03   26 Jun 2023 03:39    137    |  98     |  40     ----------------------------<  83     4.7     |  31     |  5.95     Ca    9.2        27 Jun 2023 02:55  Phos  2.6       27 Jun 2023 02:55  Mg     3.5       27 Jun 2023 02:55    TPro  7.0    /  Alb  2.4    /  TBili  0.5    /  DBili  x      /  AST  16     /  ALT  16     /  AlkPhos  79     27 Jun 2023 02:55                        9.2    5.19  )-----------( 237      ( 27 Jun 2023 02:55 )             29.4 ,                       8.9    5.58  )-----------( 219      ( 26 Jun 2023 03:39 )             29.0

## 2023-06-28 NOTE — DISCHARGE NOTE NURSING/CASE MANAGEMENT/SOCIAL WORK - PATIENT PORTAL LINK FT
You can access the FollowMyHealth Patient Portal offered by [facility] by registering at the following website: [email or web address]. By joining [facility] [employer]’s FollowMyHealth portal, you will also be able to view your health information using other applications (apps) compatible with our system.

## 2023-06-28 NOTE — DISCHARGE NOTE PROVIDER - NSDCCPCAREPLAN_GEN_ALL_CORE_FT
PRINCIPAL DISCHARGE DIAGNOSIS  Diagnosis: Metabolic encephalopathy  Assessment and Plan of Treatment:       SECONDARY DISCHARGE DIAGNOSES  Diagnosis: ESRD on dialysis  Assessment and Plan of Treatment:     Diagnosis: Pleural effusion  Assessment and Plan of Treatment: S/p thoracentesis with 1.5L removed.    Diagnosis: Essential hypertension  Assessment and Plan of Treatment:      PRINCIPAL DISCHARGE DIAGNOSIS  Diagnosis: Sepsis, unspecified organism  Assessment and Plan of Treatment: Completed course of antibiotics in hospital.      SECONDARY DISCHARGE DIAGNOSES  Diagnosis: ESRD on dialysis  Assessment and Plan of Treatment: Last HD 6/27  Please return to schedule at previous Hd center    Diagnosis: Pleural effusion  Assessment and Plan of Treatment: S/p thoracentesis with 1.5L removed.    Diagnosis: Essential hypertension  Assessment and Plan of Treatment: Continue home medications.

## 2023-06-28 NOTE — DISCHARGE NOTE PROVIDER - NSDCFUSCHEDAPPT_GEN_ALL_CORE_FT
[facility] Physician [facility] [phone]  Scheduled Appointment: 07/27/2023

## 2023-06-30 LAB — NON-GYNECOLOGICAL CYTOLOGY STUDY: SIGNIFICANT CHANGE UP

## 2023-07-05 DIAGNOSIS — I27.20 PULMONARY HYPERTENSION, UNSPECIFIED: ICD-10-CM

## 2023-07-05 DIAGNOSIS — Z99.2 DEPENDENCE ON RENAL DIALYSIS: ICD-10-CM

## 2023-07-05 DIAGNOSIS — D64.9 ANEMIA, UNSPECIFIED: ICD-10-CM

## 2023-07-05 DIAGNOSIS — Z79.82 LONG TERM (CURRENT) USE OF ASPIRIN: ICD-10-CM

## 2023-07-05 DIAGNOSIS — Z79.4 LONG TERM (CURRENT) USE OF INSULIN: ICD-10-CM

## 2023-07-05 DIAGNOSIS — Z89.612 ACQUIRED ABSENCE OF LEFT LEG ABOVE KNEE: ICD-10-CM

## 2023-07-05 DIAGNOSIS — Z89.511 ACQUIRED ABSENCE OF RIGHT LEG BELOW KNEE: ICD-10-CM

## 2023-07-05 DIAGNOSIS — I50.9 HEART FAILURE, UNSPECIFIED: ICD-10-CM

## 2023-07-05 DIAGNOSIS — J90 PLEURAL EFFUSION, NOT ELSEWHERE CLASSIFIED: ICD-10-CM

## 2023-07-05 DIAGNOSIS — Z99.81 DEPENDENCE ON SUPPLEMENTAL OXYGEN: ICD-10-CM

## 2023-07-05 DIAGNOSIS — A41.9 SEPSIS, UNSPECIFIED ORGANISM: ICD-10-CM

## 2023-07-05 DIAGNOSIS — Z51.5 ENCOUNTER FOR PALLIATIVE CARE: ICD-10-CM

## 2023-07-05 DIAGNOSIS — I13.2 HYPERTENSIVE HEART AND CHRONIC KIDNEY DISEASE WITH HEART FAILURE AND WITH STAGE 5 CHRONIC KIDNEY DISEASE, OR END STAGE RENAL DISEASE: ICD-10-CM

## 2023-07-05 DIAGNOSIS — E11.22 TYPE 2 DIABETES MELLITUS WITH DIABETIC CHRONIC KIDNEY DISEASE: ICD-10-CM

## 2023-07-05 DIAGNOSIS — N18.6 END STAGE RENAL DISEASE: ICD-10-CM

## 2023-07-05 DIAGNOSIS — R53.2 FUNCTIONAL QUADRIPLEGIA: ICD-10-CM

## 2023-07-05 DIAGNOSIS — G93.41 METABOLIC ENCEPHALOPATHY: ICD-10-CM

## 2023-07-05 DIAGNOSIS — I12.0 HYPERTENSIVE CHRONIC KIDNEY DISEASE WITH STAGE 5 CHRONIC KIDNEY DISEASE OR END STAGE RENAL DISEASE: ICD-10-CM

## 2023-07-05 DIAGNOSIS — I25.10 ATHEROSCLEROTIC HEART DISEASE OF NATIVE CORONARY ARTERY WITHOUT ANGINA PECTORIS: ICD-10-CM

## 2023-07-05 DIAGNOSIS — E78.5 HYPERLIPIDEMIA, UNSPECIFIED: ICD-10-CM

## 2023-07-22 LAB
CULTURE RESULTS: SIGNIFICANT CHANGE UP
SPECIMEN SOURCE: SIGNIFICANT CHANGE UP

## 2023-07-27 ENCOUNTER — NON-APPOINTMENT (OUTPATIENT)
Age: 68
End: 2023-07-27

## 2023-07-27 ENCOUNTER — APPOINTMENT (OUTPATIENT)
Dept: ELECTROPHYSIOLOGY | Facility: CLINIC | Age: 68
End: 2023-07-27
Payer: MEDICAID

## 2023-07-27 PROCEDURE — G2066: CPT | Mod: NC

## 2023-07-27 PROCEDURE — 93298 REM INTERROG DEV EVAL SCRMS: CPT

## 2023-07-28 NOTE — DIETITIAN INITIAL EVALUATION ADULT - NSFNSPHYEXAMSKINFT_GEN_A_CORE
85yo man with PMH ILD/ chronic resp failure on home O2, hyperlipidemia presented with worsening SOB and productive cough for about a week. Patient states he usually uses oxygen as needed, but last few days was getting more SOB and was using oxygen more. He admit to having cough productive of yellowish sputum. No hemoptysis. Never been an active smoker, but has been passive smoker for many years. Patient was admitted and treated for TJ pneumonia- was started on IV antibiotics and IV steroids.   Plan for dc home today with instructions to f/u with Pulmonologist as an outpatient.     7/28- patient was seen and examined- breathing much improved- +occasional cough- denies SOB    Vital Signs Last 24 Hrs  T(C): 36.6 (28 Jul 2023 08:22), Max: 36.6 (28 Jul 2023 08:22)  T(F): 97.8 (28 Jul 2023 08:22), Max: 97.8 (28 Jul 2023 08:22)  HR: 74 (28 Jul 2023 08:48) (69 - 81)  BP: 111/62 (28 Jul 2023 08:22) (103/58 - 115/56)  BP(mean): --  RR: 18 (28 Jul 2023 08:22) (18 - 18)  SpO2: 98% (28 Jul 2023 08:48) (93% - 99%)    Parameters below as of 28 Jul 2023 08:22  Patient On (Oxygen Delivery Method): nasal cannula  O2 Flow (L/min): 4    ROS:   All 10 systems reviewed and found to be negative with the exception of what has been described above.     PE:  Constitutional: NAD, laying in bed  HEENT: NC/AT  Back: no tenderness  Respiratory: respirations even and non labored, clear- diminished at the base   Cardiovascular: S1S2 regular, no murmurs  Abdomen: soft, not tender, not distended, positive BS  Genitourinary: voiding  Musculoskeletal: no muscle tenderness, no joint swelling or tenderness  Extremities: no pedal edema   Neurological: no focal deficits    Meds/Labs- reviewed       PLAN   #TJ pneumonia   #ILD/ chronic resp failure on home O2  Pancultured- ngtd   CXR reviewed- large infiltrate LT mid lung  CT Left upper lobe consolidation, suspicious for pneumonia in the appropriate clinical setting. Bilateral upper lobe groundglass opacities, which could represent superimposed pulmonary edema or infection.  Pulumu olmedo appreciated with DR Mckenzie  Started on IV Rocephin/ Zithromax- dc on oral antibiotics when cleared   start IV solumedrol- taper as tolerated - changed to q12 hours - dc on oral prednison  instructed to f/u with pulmonologist as an outpaitent   Nebulizer, inhalers      #Hyperlipidemia- cont statin    Case d/w team on IDR. Plan for dc home today    Stage II pressure ulcer noted on sacrum

## 2023-08-09 NOTE — PROGRESS NOTE ADULT - ASSESSMENT
62yo M with DM and R BKA, now presenting with Diabetic foot soft tissue infection.       - Wound care as per podiatry  - will follow up Renal for optimal time for angio    MERCEDEZ Ba MD PGYII [id number] Communicate fall risk and risk factors to all staff, patient, and family/Provide visual cue: yellow wristband, yellow gown, etc/Reinforce activity limits and safety measures with patient and family/Call bell, personal items and telephone in reach/Instruct patient to call for assistance before getting out of bed/chair/stretcher/Non-slip footwear applied when patient is off stretcher/Ama to call system/Physically safe environment - no spills, clutter or unnecessary equipment/Purposeful Proactive Rounding/Room/bathroom lighting operational, light cord in reach

## 2023-08-25 ENCOUNTER — INPATIENT (INPATIENT)
Facility: HOSPITAL | Age: 68
LOS: 3 days | Discharge: NURSING, REHAB OR LONG-TERM CARE | End: 2023-08-29
Attending: INTERNAL MEDICINE | Admitting: INTERNAL MEDICINE
Payer: MEDICAID

## 2023-08-25 VITALS
RESPIRATION RATE: 17 BRPM | OXYGEN SATURATION: 100 % | HEART RATE: 80 BPM | TEMPERATURE: 98 F | DIASTOLIC BLOOD PRESSURE: 72 MMHG | SYSTOLIC BLOOD PRESSURE: 154 MMHG

## 2023-08-25 DIAGNOSIS — Z89.511 ACQUIRED ABSENCE OF RIGHT LEG BELOW KNEE: Chronic | ICD-10-CM

## 2023-08-25 DIAGNOSIS — Z89.611 ACQUIRED ABSENCE OF RIGHT LEG ABOVE KNEE: Chronic | ICD-10-CM

## 2023-08-25 DIAGNOSIS — Z89.612 ACQUIRED ABSENCE OF LEFT LEG ABOVE KNEE: Chronic | ICD-10-CM

## 2023-08-25 DIAGNOSIS — Z98.890 OTHER SPECIFIED POSTPROCEDURAL STATES: Chronic | ICD-10-CM

## 2023-08-25 LAB
ALBUMIN SERPL ELPH-MCNC: 3.7 G/DL — SIGNIFICANT CHANGE UP (ref 3.3–5)
ALP SERPL-CCNC: 118 U/L — SIGNIFICANT CHANGE UP (ref 40–120)
ALT FLD-CCNC: 12 U/L — SIGNIFICANT CHANGE UP (ref 4–41)
ANION GAP SERPL CALC-SCNC: 15 MMOL/L — HIGH (ref 7–14)
ANISOCYTOSIS BLD QL: SIGNIFICANT CHANGE UP
APPEARANCE UR: ABNORMAL
AST SERPL-CCNC: 26 U/L — SIGNIFICANT CHANGE UP (ref 4–40)
BACTERIA # UR AUTO: ABNORMAL /HPF
BASE EXCESS BLDV CALC-SCNC: 11.3 MMOL/L — HIGH (ref -2–3)
BASOPHILS # BLD AUTO: 0.05 K/UL — SIGNIFICANT CHANGE UP (ref 0–0.2)
BASOPHILS NFR BLD AUTO: 0.9 % — SIGNIFICANT CHANGE UP (ref 0–2)
BILIRUB SERPL-MCNC: 0.4 MG/DL — SIGNIFICANT CHANGE UP (ref 0.2–1.2)
BILIRUB UR-MCNC: ABNORMAL
BLOOD GAS VENOUS COMPREHENSIVE RESULT: SIGNIFICANT CHANGE UP
BUN SERPL-MCNC: 26 MG/DL — HIGH (ref 7–23)
CALCIUM SERPL-MCNC: 9.4 MG/DL — SIGNIFICANT CHANGE UP (ref 8.4–10.5)
CAST: 16 /LPF — HIGH (ref 0–4)
CHLORIDE BLDV-SCNC: 95 MMOL/L — LOW (ref 96–108)
CHLORIDE SERPL-SCNC: 91 MMOL/L — LOW (ref 98–107)
CO2 BLDV-SCNC: 38 MMOL/L — HIGH (ref 22–26)
CO2 SERPL-SCNC: 27 MMOL/L — SIGNIFICANT CHANGE UP (ref 22–31)
COLOR SPEC: ABNORMAL
CREAT SERPL-MCNC: 2.16 MG/DL — HIGH (ref 0.5–1.3)
DIFF PNL FLD: ABNORMAL
EGFR: 32 ML/MIN/1.73M2 — LOW
EOSINOPHIL # BLD AUTO: 1.75 K/UL — HIGH (ref 0–0.5)
EOSINOPHIL NFR BLD AUTO: 31 % — HIGH (ref 0–6)
GAS PNL BLDV: 130 MMOL/L — LOW (ref 136–145)
GAS PNL BLDV: SIGNIFICANT CHANGE UP
GIANT PLATELETS BLD QL SMEAR: PRESENT — SIGNIFICANT CHANGE UP
GLUCOSE BLDV-MCNC: 126 MG/DL — HIGH (ref 70–99)
GLUCOSE SERPL-MCNC: 120 MG/DL — HIGH (ref 70–99)
GLUCOSE UR QL: NEGATIVE MG/DL — SIGNIFICANT CHANGE UP
HCO3 BLDV-SCNC: 36 MMOL/L — HIGH (ref 22–29)
HCT VFR BLD CALC: 40.6 % — SIGNIFICANT CHANGE UP (ref 39–50)
HCT VFR BLDA CALC: 38 % — LOW (ref 39–51)
HGB BLD CALC-MCNC: 12.8 G/DL — SIGNIFICANT CHANGE UP (ref 12.6–17.4)
HGB BLD-MCNC: 12.5 G/DL — LOW (ref 13–17)
HYPOCHROMIA BLD QL: SLIGHT — SIGNIFICANT CHANGE UP
IANC: 2.99 K/UL — SIGNIFICANT CHANGE UP (ref 1.8–7.4)
KETONES UR-MCNC: NEGATIVE MG/DL — SIGNIFICANT CHANGE UP
LACTATE BLDV-MCNC: 1.6 MMOL/L — SIGNIFICANT CHANGE UP (ref 0.5–2)
LEUKOCYTE ESTERASE UR-ACNC: ABNORMAL
LYMPHOCYTES # BLD AUTO: 0.39 K/UL — LOW (ref 1–3.3)
LYMPHOCYTES # BLD AUTO: 6.9 % — LOW (ref 13–44)
MACROCYTES BLD QL: SIGNIFICANT CHANGE UP
MAGNESIUM SERPL-MCNC: 2.5 MG/DL — SIGNIFICANT CHANGE UP (ref 1.6–2.6)
MCHC RBC-ENTMCNC: 28.5 PG — SIGNIFICANT CHANGE UP (ref 27–34)
MCHC RBC-ENTMCNC: 30.8 GM/DL — LOW (ref 32–36)
MCV RBC AUTO: 92.5 FL — SIGNIFICANT CHANGE UP (ref 80–100)
MONOCYTES # BLD AUTO: 0.44 K/UL — SIGNIFICANT CHANGE UP (ref 0–0.9)
MONOCYTES NFR BLD AUTO: 7.8 % — SIGNIFICANT CHANGE UP (ref 2–14)
NEUTROPHILS # BLD AUTO: 2.91 K/UL — SIGNIFICANT CHANGE UP (ref 1.8–7.4)
NEUTROPHILS NFR BLD AUTO: 51.7 % — SIGNIFICANT CHANGE UP (ref 43–77)
NITRITE UR-MCNC: POSITIVE
PCO2 BLDV: 49 MMHG — SIGNIFICANT CHANGE UP (ref 42–55)
PH BLDV: 7.48 — HIGH (ref 7.32–7.43)
PH UR: 8.5 (ref 5–8)
PHOSPHATE SERPL-MCNC: 2 MG/DL — LOW (ref 2.5–4.5)
PLAT MORPH BLD: NORMAL — SIGNIFICANT CHANGE UP
PLATELET # BLD AUTO: 145 K/UL — LOW (ref 150–400)
PLATELET COUNT - ESTIMATE: ABNORMAL
PO2 BLDV: 76 MMHG — HIGH (ref 25–45)
POIKILOCYTOSIS BLD QL AUTO: SLIGHT — SIGNIFICANT CHANGE UP
POLYCHROMASIA BLD QL SMEAR: SLIGHT — SIGNIFICANT CHANGE UP
POTASSIUM BLDV-SCNC: 6 MMOL/L — HIGH (ref 3.5–5.1)
POTASSIUM SERPL-MCNC: 4.9 MMOL/L — SIGNIFICANT CHANGE UP (ref 3.5–5.3)
POTASSIUM SERPL-SCNC: 4.9 MMOL/L — SIGNIFICANT CHANGE UP (ref 3.5–5.3)
PROT SERPL-MCNC: 8.4 G/DL — HIGH (ref 6–8.3)
PROT UR-MCNC: 300 MG/DL
RBC # BLD: 4.39 M/UL — SIGNIFICANT CHANGE UP (ref 4.2–5.8)
RBC # FLD: 17.8 % — HIGH (ref 10.3–14.5)
RBC BLD AUTO: ABNORMAL
RBC CASTS # UR COMP ASSIST: 7473 /HPF — HIGH (ref 0–4)
REVIEW: SIGNIFICANT CHANGE UP
SAO2 % BLDV: 96.2 % — HIGH (ref 67–88)
SODIUM SERPL-SCNC: 133 MMOL/L — LOW (ref 135–145)
SP GR SPEC: 1.01 — SIGNIFICANT CHANGE UP (ref 1–1.03)
SQUAMOUS # UR AUTO: 1 /HPF — SIGNIFICANT CHANGE UP (ref 0–5)
TROPONIN T, HIGH SENSITIVITY RESULT: 275 NG/L — CRITICAL HIGH
UROBILINOGEN FLD QL: 0.2 MG/DL — SIGNIFICANT CHANGE UP (ref 0.2–1)
VARIANT LYMPHS # BLD: 1.7 % — SIGNIFICANT CHANGE UP (ref 0–6)
WBC # BLD: 5.63 K/UL — SIGNIFICANT CHANGE UP (ref 3.8–10.5)
WBC # FLD AUTO: 5.63 K/UL — SIGNIFICANT CHANGE UP (ref 3.8–10.5)
WBC UR QL: 1375 /HPF — HIGH (ref 0–5)

## 2023-08-25 PROCEDURE — 71045 X-RAY EXAM CHEST 1 VIEW: CPT | Mod: 26

## 2023-08-25 PROCEDURE — 99285 EMERGENCY DEPT VISIT HI MDM: CPT

## 2023-08-25 RX ADMIN — Medication 1 MILLIGRAM(S): at 23:06

## 2023-08-25 NOTE — ED PROVIDER NOTE - PROGRESS NOTE DETAILS
Masoud MORAN PGY3: pt having intermittent episodes of hypoxia. pt with + UA CT c/f PNA. given abx. spoke to hospitalist who will admit pt.

## 2023-08-25 NOTE — ED ADULT TRIAGE NOTE - CHIEF COMPLAINT QUOTE
Pt arrives via EMS from HD s/p 4 vomiting episodes after finishing his session. Pt hypertensive to 170s following episode. Pt at baseline mental status as per HD staff. RR even and unlabored. PMHx: DM, HTN, bilateral BKA, CKD. Pt arrives via EMS from HD s/p 4 vomiting episodes after finishing his session. Pt hypertensive to 170s following episode. Pt at baseline mental status as per HD staff. RR even and unlabored. Arrives on 2L NC (baseline). PMHx: DM, HTN, bilateral BKA, CKD.

## 2023-08-25 NOTE — ED ADULT NURSE NOTE - OBJECTIVE STATEMENT
Patient presents to ED from hemodialysis for nausea, vomiting after dialysis. He is AA&Ox2, to person and place, calm, cooperative. In no acute distress. He states he was feeling nauseous and vomited at dialysis center but "feels better now." Denies pain. No CP, dizziness, SOB, dyspnea. Respirations even, unlabored on 2L O2 NC. Past medical history DM, HTN, bilateral BKA, CKD on hemodialysis. Left arm AV fistula, dialysis Monday, Wednesday, Friday. 22G IV placed right hand, labs drawn and sent.

## 2023-08-25 NOTE — ED PROVIDER NOTE - OBJECTIVE STATEMENT
68-year-old male history of quadriplegia chief complaint unclear patient is unaware of why he is in the hospital.  Patient otherwise has history of diabetes hypertension.  Patient is endorsing any pain per ED triage patient was having vomiting episodes after getting dialysis and was hypertensive to the 170s.  Patient is not complaining of any vomiting no nausea is not endorsing any pain

## 2023-08-25 NOTE — ED PROVIDER NOTE - CLINICAL SUMMARY MEDICAL DECISION MAKING FREE TEXT BOX
Given history and physical we will need to obtain collateral for further history will obtain broad work-up dispo pending collateral and results

## 2023-08-25 NOTE — ED PROVIDER NOTE - PHYSICAL EXAMINATION
GENERAL: Awake, alert, NAD  LUNGS: CTAB, no wheezes or crackles   CARDIAC: RRR, no m/r/g  ABDOMEN: Soft, non tender, non distended, no rebound, no guarding  NEURO: A&Ox2  SKIN: Warm and dry. No rash.  PSYCH: Normal affect.

## 2023-08-25 NOTE — ED PROVIDER NOTE - ATTENDING CONTRIBUTION TO CARE
68-year-old male, past medical history of ESRD on HD (MWF), diabetes, hypertension, bilateral BKA (functional quadriplegic), and CAD, presents to ED for episodes of nausea/vomiting status post finishing dialysis treatment today.  Patient is A&O x1 and does not know why he is in the hospital.  Patient states  he is sick, but unable to elaborate more.  Unable to form full ROS.    Vital signs stable.  Heart is regular rate and rhythm.  Lungs clear to auscultation bilaterally (however poor respiratory effort per patient) abdomen is soft and nontender and nondistended.  Left upper extremity AV fistula with palpable thrill.  Bilateral lower extremities status post BKA.  Stumps appear well-healed without signs of infection.  Neuro exam is nonfocal.    Given patient is poor historian and unreliable.  Will do broad infectious/metabolic work-up.  We will check basic labs/electrolytes, urinalysis, and chest x-ray. Dispo pending results and reassessment, however likely may require admission.

## 2023-08-25 NOTE — ED ADULT NURSE NOTE - NSFALLUNIVINTERV_ED_ALL_ED
Bed/Stretcher in lowest position, wheels locked, appropriate side rails in place/Call bell, personal items and telephone in reach/Instruct patient to call for assistance before getting out of bed/chair/stretcher/Non-slip footwear applied when patient is off stretcher/[place]edom to call system/Physically safe environment - no spills, clutter or unnecessary equipment/Purposeful proactive rounding/Room/bathroom lighting operational, light cord in reach

## 2023-08-25 NOTE — ED ADULT TRIAGE NOTE - INTERNATIONAL TRAVEL
-- DO NOT REPLY / DO NOT REPLY ALL --  -- Message is from the [facility]--    General Patient Message      Reason for Call: Patient would like to speak to Jose Keen before her appointment on 03/03/22    Caller Information       Type Contact Phone    02/05/2022 11:37 AM CST Phone (Incoming) Bess Gutierrezrina (Self) [phone] (M)          Alternative phone number: none         Did the caller agree that this message can wait until the office reopens on Monday (or after the holiday)? YES - The Message Can Wait      Send a message to the provider's clinical support pool.     Turnaround time given to caller:   \"This message will be sent to [state Provider's name]. The clinical team will fulfill your request as soon as they review your message when the office opens on Monday (or after the holiday).\"      
No

## 2023-08-25 NOTE — ED ADULT NURSE NOTE - CHIEF COMPLAINT QUOTE
Pt arrives via EMS from HD s/p 4 vomiting episodes after finishing his session. Pt hypertensive to 170s following episode. Pt at baseline mental status as per HD staff. RR even and unlabored. Arrives on 2L NC (baseline). PMHx: DM, HTN, bilateral BKA, CKD.

## 2023-08-26 DIAGNOSIS — J90 PLEURAL EFFUSION, NOT ELSEWHERE CLASSIFIED: ICD-10-CM

## 2023-08-26 DIAGNOSIS — R77.8 OTHER SPECIFIED ABNORMALITIES OF PLASMA PROTEINS: ICD-10-CM

## 2023-08-26 DIAGNOSIS — I10 ESSENTIAL (PRIMARY) HYPERTENSION: ICD-10-CM

## 2023-08-26 DIAGNOSIS — J18.9 PNEUMONIA, UNSPECIFIED ORGANISM: ICD-10-CM

## 2023-08-26 DIAGNOSIS — N39.0 URINARY TRACT INFECTION, SITE NOT SPECIFIED: ICD-10-CM

## 2023-08-26 DIAGNOSIS — Z86.69 PERSONAL HISTORY OF OTHER DISEASES OF THE NERVOUS SYSTEM AND SENSE ORGANS: ICD-10-CM

## 2023-08-26 DIAGNOSIS — J96.01 ACUTE RESPIRATORY FAILURE WITH HYPOXIA: ICD-10-CM

## 2023-08-26 DIAGNOSIS — N18.6 END STAGE RENAL DISEASE: ICD-10-CM

## 2023-08-26 DIAGNOSIS — I25.10 ATHEROSCLEROTIC HEART DISEASE OF NATIVE CORONARY ARTERY WITHOUT ANGINA PECTORIS: ICD-10-CM

## 2023-08-26 LAB
ANION GAP SERPL CALC-SCNC: 15 MMOL/L — HIGH (ref 7–14)
APTT BLD: 36.2 SEC — HIGH (ref 24.5–35.6)
BUN SERPL-MCNC: 35 MG/DL — HIGH (ref 7–23)
CALCIUM SERPL-MCNC: 9.1 MG/DL — SIGNIFICANT CHANGE UP (ref 8.4–10.5)
CHLORIDE SERPL-SCNC: 90 MMOL/L — LOW (ref 98–107)
CK MB BLD-MCNC: 3.1 % — HIGH (ref 0–2.5)
CK MB CFR SERPL CALC: 3 NG/ML — SIGNIFICANT CHANGE UP
CK SERPL-CCNC: 98 U/L — SIGNIFICANT CHANGE UP (ref 30–200)
CO2 SERPL-SCNC: 26 MMOL/L — SIGNIFICANT CHANGE UP (ref 22–31)
CREAT SERPL-MCNC: 3 MG/DL — HIGH (ref 0.5–1.3)
CULTURE RESULTS: NO GROWTH — SIGNIFICANT CHANGE UP
EGFR: 22 ML/MIN/1.73M2 — LOW
GLUCOSE BLDC GLUCOMTR-MCNC: 110 MG/DL — HIGH (ref 70–99)
GLUCOSE BLDC GLUCOMTR-MCNC: 77 MG/DL — SIGNIFICANT CHANGE UP (ref 70–99)
GLUCOSE BLDC GLUCOMTR-MCNC: 78 MG/DL — SIGNIFICANT CHANGE UP (ref 70–99)
GLUCOSE SERPL-MCNC: 136 MG/DL — HIGH (ref 70–99)
HCT VFR BLD CALC: 41.1 % — SIGNIFICANT CHANGE UP (ref 39–50)
HEMOLYSIS INDEX: 5 — SIGNIFICANT CHANGE UP
HGB BLD-MCNC: 12.6 G/DL — LOW (ref 13–17)
INR BLD: 1 RATIO — SIGNIFICANT CHANGE UP (ref 0.85–1.18)
LDH SERPL L TO P-CCNC: 513 U/L — HIGH (ref 135–225)
MAGNESIUM SERPL-MCNC: 2.6 MG/DL — SIGNIFICANT CHANGE UP (ref 1.6–2.6)
MCHC RBC-ENTMCNC: 28.2 PG — SIGNIFICANT CHANGE UP (ref 27–34)
MCHC RBC-ENTMCNC: 30.7 GM/DL — LOW (ref 32–36)
MCV RBC AUTO: 91.9 FL — SIGNIFICANT CHANGE UP (ref 80–100)
NRBC # BLD: 0 /100 WBCS — SIGNIFICANT CHANGE UP (ref 0–0)
NRBC # FLD: 0 K/UL — SIGNIFICANT CHANGE UP (ref 0–0)
PHOSPHATE SERPL-MCNC: 2.5 MG/DL — SIGNIFICANT CHANGE UP (ref 2.5–4.5)
PLATELET # BLD AUTO: 126 K/UL — LOW (ref 150–400)
POTASSIUM SERPL-MCNC: 4.7 MMOL/L — SIGNIFICANT CHANGE UP (ref 3.5–5.3)
POTASSIUM SERPL-MCNC: 5.9 MMOL/L — HIGH (ref 3.5–5.3)
POTASSIUM SERPL-SCNC: 4.7 MMOL/L — SIGNIFICANT CHANGE UP (ref 3.5–5.3)
POTASSIUM SERPL-SCNC: 5.9 MMOL/L — HIGH (ref 3.5–5.3)
PROTHROM AB SERPL-ACNC: 11.3 SEC — SIGNIFICANT CHANGE UP (ref 9.5–13)
RBC # BLD: 4.47 M/UL — SIGNIFICANT CHANGE UP (ref 4.2–5.8)
RBC # FLD: 17.7 % — HIGH (ref 10.3–14.5)
SODIUM SERPL-SCNC: 131 MMOL/L — LOW (ref 135–145)
SPECIMEN SOURCE: SIGNIFICANT CHANGE UP
TROPONIN T, HIGH SENSITIVITY RESULT: 295 NG/L — CRITICAL HIGH
TROPONIN T, HIGH SENSITIVITY RESULT: 300 NG/L — CRITICAL HIGH
TROPONIN T, HIGH SENSITIVITY RESULT: 331 NG/L — CRITICAL HIGH
WBC # BLD: 6.2 K/UL — SIGNIFICANT CHANGE UP (ref 3.8–10.5)
WBC # FLD AUTO: 6.2 K/UL — SIGNIFICANT CHANGE UP (ref 3.8–10.5)

## 2023-08-26 PROCEDURE — 71250 CT THORAX DX C-: CPT | Mod: 26,MA

## 2023-08-26 PROCEDURE — 99233 SBSQ HOSP IP/OBS HIGH 50: CPT

## 2023-08-26 PROCEDURE — 99223 1ST HOSP IP/OBS HIGH 75: CPT | Mod: GC

## 2023-08-26 PROCEDURE — 99497 ADVNCD CARE PLAN 30 MIN: CPT

## 2023-08-26 RX ORDER — DEXTROSE 50 % IN WATER 50 %
25 SYRINGE (ML) INTRAVENOUS ONCE
Refills: 0 | Status: DISCONTINUED | OUTPATIENT
Start: 2023-08-26 | End: 2023-08-29

## 2023-08-26 RX ORDER — PIPERACILLIN AND TAZOBACTAM 4; .5 G/20ML; G/20ML
3.38 INJECTION, POWDER, LYOPHILIZED, FOR SOLUTION INTRAVENOUS EVERY 12 HOURS
Refills: 0 | Status: DISCONTINUED | OUTPATIENT
Start: 2023-08-26 | End: 2023-08-29

## 2023-08-26 RX ORDER — AZITHROMYCIN 500 MG/1
500 TABLET, FILM COATED ORAL ONCE
Refills: 0 | Status: COMPLETED | OUTPATIENT
Start: 2023-08-26 | End: 2023-08-26

## 2023-08-26 RX ORDER — ACETAMINOPHEN 500 MG
650 TABLET ORAL EVERY 6 HOURS
Refills: 0 | Status: DISCONTINUED | OUTPATIENT
Start: 2023-08-26 | End: 2023-08-29

## 2023-08-26 RX ORDER — PREDNISOLONE SODIUM PHOSPHATE 1 %
1 DROPS OPHTHALMIC (EYE)
Refills: 0 | Status: DISCONTINUED | OUTPATIENT
Start: 2023-08-26 | End: 2023-08-29

## 2023-08-26 RX ORDER — CALCIUM ACETATE 667 MG
667 TABLET ORAL
Refills: 0 | Status: DISCONTINUED | OUTPATIENT
Start: 2023-08-26 | End: 2023-08-29

## 2023-08-26 RX ORDER — CEFTRIAXONE 500 MG/1
1000 INJECTION, POWDER, FOR SOLUTION INTRAMUSCULAR; INTRAVENOUS ONCE
Refills: 0 | Status: COMPLETED | OUTPATIENT
Start: 2023-08-26 | End: 2023-08-26

## 2023-08-26 RX ORDER — VANCOMYCIN HCL 1 G
1000 VIAL (EA) INTRAVENOUS ONCE
Refills: 0 | Status: COMPLETED | OUTPATIENT
Start: 2023-08-26 | End: 2023-08-26

## 2023-08-26 RX ORDER — GLUCAGON INJECTION, SOLUTION 0.5 MG/.1ML
1 INJECTION, SOLUTION SUBCUTANEOUS ONCE
Refills: 0 | Status: DISCONTINUED | OUTPATIENT
Start: 2023-08-26 | End: 2023-08-29

## 2023-08-26 RX ORDER — LANOLIN ALCOHOL/MO/W.PET/CERES
3 CREAM (GRAM) TOPICAL AT BEDTIME
Refills: 0 | Status: DISCONTINUED | OUTPATIENT
Start: 2023-08-26 | End: 2023-08-29

## 2023-08-26 RX ORDER — LATANOPROST 0.05 MG/ML
1 SOLUTION/ DROPS OPHTHALMIC; TOPICAL AT BEDTIME
Refills: 0 | Status: DISCONTINUED | OUTPATIENT
Start: 2023-08-26 | End: 2023-08-29

## 2023-08-26 RX ORDER — ONDANSETRON 8 MG/1
4 TABLET, FILM COATED ORAL EVERY 8 HOURS
Refills: 0 | Status: DISCONTINUED | OUTPATIENT
Start: 2023-08-26 | End: 2023-08-29

## 2023-08-26 RX ORDER — DEXTROSE 50 % IN WATER 50 %
15 SYRINGE (ML) INTRAVENOUS ONCE
Refills: 0 | Status: DISCONTINUED | OUTPATIENT
Start: 2023-08-26 | End: 2023-08-29

## 2023-08-26 RX ORDER — PIPERACILLIN AND TAZOBACTAM 4; .5 G/20ML; G/20ML
3.38 INJECTION, POWDER, LYOPHILIZED, FOR SOLUTION INTRAVENOUS EVERY 8 HOURS
Refills: 0 | Status: DISCONTINUED | OUTPATIENT
Start: 2023-08-26 | End: 2023-08-26

## 2023-08-26 RX ORDER — PANTOPRAZOLE SODIUM 20 MG/1
40 TABLET, DELAYED RELEASE ORAL
Refills: 0 | Status: DISCONTINUED | OUTPATIENT
Start: 2023-08-26 | End: 2023-08-29

## 2023-08-26 RX ORDER — LACTULOSE 10 G/15ML
20 SOLUTION ORAL AT BEDTIME
Refills: 0 | Status: DISCONTINUED | OUTPATIENT
Start: 2023-08-26 | End: 2023-08-29

## 2023-08-26 RX ORDER — HEPARIN SODIUM 5000 [USP'U]/ML
5000 INJECTION INTRAVENOUS; SUBCUTANEOUS EVERY 12 HOURS
Refills: 0 | Status: DISCONTINUED | OUTPATIENT
Start: 2023-08-26 | End: 2023-08-29

## 2023-08-26 RX ORDER — ATORVASTATIN CALCIUM 80 MG/1
80 TABLET, FILM COATED ORAL AT BEDTIME
Refills: 0 | Status: DISCONTINUED | OUTPATIENT
Start: 2023-08-26 | End: 2023-08-29

## 2023-08-26 RX ORDER — SENNA PLUS 8.6 MG/1
2 TABLET ORAL AT BEDTIME
Refills: 0 | Status: DISCONTINUED | OUTPATIENT
Start: 2023-08-26 | End: 2023-08-29

## 2023-08-26 RX ORDER — LABETALOL HCL 100 MG
200 TABLET ORAL THREE TIMES A DAY
Refills: 0 | Status: DISCONTINUED | OUTPATIENT
Start: 2023-08-26 | End: 2023-08-26

## 2023-08-26 RX ORDER — PIPERACILLIN AND TAZOBACTAM 4; .5 G/20ML; G/20ML
3.38 INJECTION, POWDER, LYOPHILIZED, FOR SOLUTION INTRAVENOUS ONCE
Refills: 0 | Status: COMPLETED | OUTPATIENT
Start: 2023-08-26 | End: 2023-08-26

## 2023-08-26 RX ORDER — DEXTROSE 50 % IN WATER 50 %
12.5 SYRINGE (ML) INTRAVENOUS ONCE
Refills: 0 | Status: DISCONTINUED | OUTPATIENT
Start: 2023-08-26 | End: 2023-08-29

## 2023-08-26 RX ORDER — ASPIRIN/CALCIUM CARB/MAGNESIUM 324 MG
81 TABLET ORAL DAILY
Refills: 0 | Status: DISCONTINUED | OUTPATIENT
Start: 2023-08-26 | End: 2023-08-29

## 2023-08-26 RX ORDER — NIFEDIPINE 30 MG
60 TABLET, EXTENDED RELEASE 24 HR ORAL AT BEDTIME
Refills: 0 | Status: DISCONTINUED | OUTPATIENT
Start: 2023-08-26 | End: 2023-08-26

## 2023-08-26 RX ADMIN — Medication 250 MILLIGRAM(S): at 07:49

## 2023-08-26 RX ADMIN — AZITHROMYCIN 255 MILLIGRAM(S): 500 TABLET, FILM COATED ORAL at 06:41

## 2023-08-26 RX ADMIN — LATANOPROST 1 DROP(S): 0.05 SOLUTION/ DROPS OPHTHALMIC; TOPICAL at 23:43

## 2023-08-26 RX ADMIN — Medication 667 MILLIGRAM(S): at 18:46

## 2023-08-26 RX ADMIN — PIPERACILLIN AND TAZOBACTAM 200 GRAM(S): 4; .5 INJECTION, POWDER, LYOPHILIZED, FOR SOLUTION INTRAVENOUS at 05:23

## 2023-08-26 RX ADMIN — HEPARIN SODIUM 5000 UNIT(S): 5000 INJECTION INTRAVENOUS; SUBCUTANEOUS at 18:46

## 2023-08-26 RX ADMIN — Medication 81 MILLIGRAM(S): at 18:46

## 2023-08-26 RX ADMIN — PIPERACILLIN AND TAZOBACTAM 25 GRAM(S): 4; .5 INJECTION, POWDER, LYOPHILIZED, FOR SOLUTION INTRAVENOUS at 13:53

## 2023-08-26 RX ADMIN — ATORVASTATIN CALCIUM 80 MILLIGRAM(S): 80 TABLET, FILM COATED ORAL at 22:03

## 2023-08-26 RX ADMIN — CEFTRIAXONE 1000 MILLIGRAM(S): 500 INJECTION, POWDER, FOR SOLUTION INTRAMUSCULAR; INTRAVENOUS at 03:30

## 2023-08-26 RX ADMIN — CEFTRIAXONE 100 MILLIGRAM(S): 500 INJECTION, POWDER, FOR SOLUTION INTRAMUSCULAR; INTRAVENOUS at 02:54

## 2023-08-26 RX ADMIN — SENNA PLUS 2 TABLET(S): 8.6 TABLET ORAL at 22:03

## 2023-08-26 RX ADMIN — Medication 3 MILLIGRAM(S): at 22:03

## 2023-08-26 RX ADMIN — Medication 1 DROP(S): at 17:08

## 2023-08-26 NOTE — PATIENT PROFILE ADULT - FALL HARM RISK - HARM RISK INTERVENTIONS

## 2023-08-26 NOTE — H&P ADULT - HISTORY OF PRESENT ILLNESS
69 yo male with pmhx of ESRD on HD (LUE AVF, MWF), functional quadriplegia (R sided BKA + L sided AKA,), blindness, CAD, HTN, HLD, on 2L home O2 who presents to the ED for complaints of nausea and noted to be in acute hypoxic resp failure. Patient is ANO1-2 at baseline and is a poor historian, attempted to get collateral from family using multiple numbers on the emergency contact but unable to reach. History gathered through mostly chart review.     Patient is oriented X2, states he feels fine. when asked about nausea and sob, states he feels that sometimes, denies current sob, no fever/chills. per chart review, initially came in for complaints of nausea. per ED triage, patient was having vomiting episodes after getting dialysis and was hypertensive to the 170s, thus was sent to hospital. In the ed, initial vitals, afebrile, /72, satting well on room air initially but when asleep desat to 70s, placed  .     67 yo male with pmhx of ESRD on HD (LUE AVF, MWF), functional quadriplegia (R sided BKA + L sided AKA,), blindness, CAD, HTN, HLD, on 2L home O2 who presents to the ED for complaints of nausea and noted to be in acute hypoxic resp failure. Patient is ANO1-2 at baseline and is a poor historian, attempted to get collateral from family using multiple numbers on the emergency contact but unable to reach. History gathered through mostly chart review.     Patient is oriented X2, states he feels fine. when asked about nausea and sob, states he feels that sometimes, denies current sob, no fever/chills. per chart review, initially came in for complaints of nausea. per ED triage, patient was having vomiting episodes after getting dialysis and was hypertensive to the 170s, thus was sent to hospital. In the ed, initial vitals, afebrile, /72, satting well on room air initially but when asleep desat to 70s, placed on Venturi Mask 40% Satting well.

## 2023-08-26 NOTE — H&P ADULT - NSHPPHYSICALEXAM_GEN_ALL_CORE
VITAL SIGNS:  T(C): 37.2 (08-26-23 @ 06:00), Max: 37.2 (08-26-23 @ 06:00)  T(F): 98.9 (08-26-23 @ 06:00), Max: 98.9 (08-26-23 @ 06:00)  HR: 71 (08-26-23 @ 11:50) (71 - 91)  BP: 138/78 (08-26-23 @ 11:50) (114/75 - 166/74)  BP(mean): --  RR: 19 (08-26-23 @ 11:50) (13 - 21)  SpO2: 98% (08-26-23 @ 11:50) (95% - 100%)  Wt(kg): --    PHYSICAL EXAM:  Constitutional: WDWN resting comfortably in bed; NAD  Head: NC/AT  Eyes: PERRL, EOMI, anicteric sclera  ENT: no nasal discharge; MMM  Neck: supple; no JVD  Respiratory: CTA B/L; no W/R/R  Cardiac: +S1/S2; RRR; no M/R/G  Gastrointestinal: soft, NT/ND; no rebound or guarding; +BSx4  Extremities: WWP, no clubbing or cyanosis; no peripheral edema  Musculoskeletal: NROM x4; no joint swelling, tenderness or erythema  Vascular: 2+ radial, DP/PT pulses B/L  Dermatologic: skin warm, dry and intact; no rashes, wounds, or scars  Neurologic: AAOx3; CNII-XII grossly intact; no focal deficits  Psychiatric: affect and characteristics of appearance, verbalizations, behaviors are appropriate VITAL SIGNS:  T(C): 37.2 (08-26-23 @ 06:00), Max: 37.2 (08-26-23 @ 06:00)  T(F): 98.9 (08-26-23 @ 06:00), Max: 98.9 (08-26-23 @ 06:00)  HR: 71 (08-26-23 @ 11:50) (71 - 91)  BP: 138/78 (08-26-23 @ 11:50) (114/75 - 166/74)  BP(mean): --  RR: 19 (08-26-23 @ 11:50) (13 - 21)  SpO2: 98% (08-26-23 @ 11:50) (95% - 100%)  Wt(kg): --    PHYSICAL EXAM:  Constitutional: NAD, elderly   Head: NC/AT  Eyes: PERRL  Respiratory: CTA B/L; no W/R/R  Cardiac: +S1/S2; RRR; no M/R/G  Gastrointestinal: soft, NT/ND; no rebound or guarding; +BSx4  Extremities: LUE AVF, s/p bl amputation  Neurologic: AAOx1-2, no focal deficits

## 2023-08-26 NOTE — H&P ADULT - PROBLEM SELECTOR PLAN 1
pw acute hypoxia, currently on venturi mask. CT Chest with bilateral pleural effusion L>R, possible loculation, possible superimposed PNA.  possible etiology transudative 2/2 ESRD, possible CHF, other ddx include exudative process 2/2 parapneumonic vs. empyema, malignancy cannot be ruled out.     - Cont to monitor pulse ox  - Pulmonology consulted for thora, appreciate recs  - cont with vanc 1g daily fu vanc random  - cont with zosyn   - fu blood culture/sputum culture  - tele order

## 2023-08-26 NOTE — ED ADULT NURSE REASSESSMENT NOTE - NS ED NURSE REASSESS COMMENT FT1
Spoke with Dotty, Nurse manager from SNF. Dotty made aware pt is being admitted for pna.
pt A&ox2, awake and alert, drowsy. breathing is spontaneous and unlabored. MD Acevedo at bedside to perform A-stick for blood work. MD unable to obtain blood cultures. Md aware. as per MD Acevedo okay to start IV Zosyn when due.
pt A&ox2, awake and alert. denies pain. NSR on tele. breathing spontaneous and unlabored. report given to [facility] RN Cho. pt to be transported to [facility].
break coverage RN: pt resting comfortably in stretcher respirations even and non labored. NSR on tele. pt admitted to hospital for PNA. awaiting orders for blood cultures prior to antibiotic administrations.
pt A&ox2, denies chest pain and SOB. no complaints of pain.  breathing is spontaneous and unlabored. NSR on continuos cardiac and pulse ox monitoring in place. bed in lowest position, call bell within reach, siderails up x2. left forearm AV fistula in place, bruit audible and thrill palpable and strong. pt awaiting bed placement. will continue to monitor.

## 2023-08-26 NOTE — CONSULT NOTE ADULT - SUBJECTIVE AND OBJECTIVE BOX
Carlos Dumont MD  Nephrology  [phone]      JAMES PATRICK  Patient is a 68y old  Male who presents with a chief complaint of Nausea and hypoxia (26 Aug 2023 10:09)    HPI:  [facility], admitted for Hypoxia and nausea. Noted to have pleural effusions.  Last HD was yesterday. he is not able to give history.    PAST MEDICAL & SURGICAL HISTORY:  DM (diabetes mellitus)      HTN (hypertension)      Below knee amputation status, right      CKD (chronic kidney disease)      MRSA (methicillin resistant Staphylococcus aureus) colonization  (hx/o MRSA growth from wound culture)      Wound  (chronic wounds to left foot and leg)      DM (diabetes mellitus)      HTN (hypertension)      Kidney disease      S/P BKA (below knee amputation) unilateral, right      H/O peripheral artery bypass  left fem to below-knee pop with RSVG      Amputated left leg  above knee      Amputated right leg  below knee        MEDICATIONS  (STANDING):  piperacillin/tazobactam IVPB.. 3.375 Gram(s) IV Intermittent every 8 hours    Allergies    No Known Allergies    Intolerances      FAMILY HISTORY:  Family history of diabetes mellitus        REVIEW OF SYSTEMS  Unable to fully assess.  	      Vital Signs Last 24 Hrs  T(C): 37.2 (26 Aug 2023 06:00), Max: 37.2 (26 Aug 2023 06:00)  T(F): 98.9 (26 Aug 2023 06:00), Max: 98.9 (26 Aug 2023 06:00)  HR: 83 (26 Aug 2023 08:27) (78 - 91)  BP: 114/75 (26 Aug 2023 08:27) (114/75 - 166/74)  BP(mean): --  RR: 16 (26 Aug 2023 08:27) (13 - 21)  SpO2: 98% (26 Aug 2023 08:27) (95% - 100%)    Parameters below as of 26 Aug 2023 08:27  Patient On (Oxygen Delivery Method): mask, Venturi    O2 Concentration (%): 23    PHYSICAL EXAMINATION:  Constitutional:  He appears comfortable and not distressed. Not diaphoretic.    Neck:  The thyroid is normal. Trachea is midline.     Breasts: Normal examination.    Respiratory: The lungs are clear to auscultation. No dullness and expansion is normal.    Cardiovascular: S1 and S2 are normal. No mummurs, rubs or gallops are present.    Gastrointestinal: The abdomen is soft. No tenderness is present. No masses are present. Bowel sounds are normal.    Genitourinary: The bladder is not distended. No CVA tenderness is present.    Extremities: No edema is noted. No deformities are present.    Neurological: Awake, not oriented.. Tone, power and sensation are normal.     Skin: No lesions are seen  or palpated.    Lymph Nodes: No lymphadenopathy is present.                          12.6   6.20  )-----------( 126      ( 26 Aug 2023 09:55 )             41.1     08-26    131<L>  |  90<L>  |  35<H>  ----------------------------<  136<H>  5.9<H>   |  26  |  3.00<H>    Ca    9.1      26 Aug 2023 09:55  Phos  2.5     08-26  Mg     2.60     08-26      TPro  8.4<H>  /  Alb  3.7  /  TBili  0.4  /  DBili  x   /  AST  26  /  ALT  12  /  AlkPhos  118  08-25    LIVER FUNCTIONS - ( 25 Aug 2023 19:58 )  Alb: 3.7 g/dL / Pro: 8.4 g/dL / ALK PHOS: 118 U/L / ALT: 12 U/L / AST: 26 U/L / GGT: x           Urinalysis + Microscopic Examination (08.25.23 @ 23:30)   pH Urine: 8.5  Urine Appearance: Turbid  Color: Red  Specific Gravity: 1.014  Protein, Urine: 300 mg/dL  Glucose Qualitative, Urine: Negative mg/dL  Ketone - Urine: Negative mg/dL  Blood, Urine: Large  Bilirubin: Small  Urobilinogen: 0.2 mg/dL  Leukocyte Esterase Concentration: Large  Nitrite: Positive  Review: Reviewed  White Blood Cell - Urine: 1375 /HPF  Red Blood Cell - Urine: 7473 /HPF  Bacteria: Moderate /HPF  Cast: 16 /LPF  Epithelial Cells: 1 /HPF.  < from: CT Chest No Cont (08.26.23 @ 01:51) >  Bilateral pleural effusions, left greater than right. Associated   compressive atelectasis on the left. Superimposed pneumonia should be   evaluated for clinical basis.    Emphysema.    Borderline heart size.    Shotty mediastinal lymphadenopathy of uncertain significance.    Additional findings as above.    < end of copied text >    
CHIEF COMPLAINT: n/v    HPI: 67 yo male with pmhx of ESRD on HD (LUE AVF, MWF), functional quadriplegia (R sided BKA + L sided AKA,), blindness, CAD, HTN, HLD, on 2L home O2 who presented for n/v while at HD. Patient is ANO1-2 at baseline and is a poor historian. Pulm consulted for desaturations requiring Venturi masks.    Per EMR/team, in the ED patient was hypoxic when asleep, desaturating to 70s/80s and requiring Venturi mask at 40%. However, when awake, was comfortable on home 2L NC. Unclear reason why pt on home O2. CXR showed L.R pleural effusions. Notably, patient with recent MICU adm in 06/23 with thoracentesis done, with ~1.5 L drained, transudative, with unremarkable microbio/cytology. Once admitted and at bedside, patient denying SOB and comfortable on RA (no O2 thru nasal cannula), saturating 97%. Denies hx of TAYLER.     PAST MEDICAL & SURGICAL HISTORY:  DM (diabetes mellitus)      HTN (hypertension)      Below knee amputation status, right      CKD (chronic kidney disease)      MRSA (methicillin resistant Staphylococcus aureus) colonization  (hx/o MRSA growth from wound culture)      Wound  (chronic wounds to left foot and leg)      DM (diabetes mellitus)      HTN (hypertension)      Kidney disease      S/P BKA (below knee amputation) unilateral, right      H/O peripheral artery bypass  left fem to below-knee pop with RSVG      Amputated left leg  above knee      Amputated right leg  below knee          FAMILY HISTORY:  Family history of diabetes mellitus            Allergies    No Known Allergies    Intolerances        HOME MEDICATIONS:    REVIEW OF SYSTEMS:  Constitutional: [ ] negative [ ] fevers [ ] chills [ ] weight loss [ ] weight gain  HEENT: [ ] negative [ ] dry eyes [ ] eye irritation [ ] postnasal drip [ ] nasal congestion  CV: [ ] negative  [ ] chest pain [ ] orthopnea [ ] palpitations [ ] murmur  Resp: [ ] negative [ ] cough [ ] shortness of breath [ ] dyspnea [ ] wheezing [ ] sputum [ ] hemoptysis  GI: [ ] negative [ ] nausea [ ] vomiting [ ] diarrhea [ ] constipation [ ] abd pain [ ] dysphagia   : [ ] negative [ ] dysuria [ ] nocturia [ ] hematuria [ ] increased urinary frequency  Musculoskeletal: [ ] negative [ ] back pain [ ] myalgias [ ] arthralgias [ ] fracture  Skin: [ ] negative [ ] rash [ ] itch  Neurological: [ ] negative [ ] headache [ ] dizziness [ ] syncope [ ] weakness [ ] numbness  Psychiatric: [ ] negative [ ] anxiety [ ] depression  Endocrine: [ ] negative [ ] diabetes [ ] thyroid problem  Hematologic/Lymphatic: [ ] negative [ ] anemia [ ] bleeding problem  Allergic/Immunologic: [ ] negative [ ] itchy eyes [ ] nasal discharge [ ] hives [ ] angioedema  [x ] All other systems negative  [ ] Unable to assess ROS because ________    OBJECTIVE:  ICU Vital Signs Last 24 Hrs  T(C): 36.8 (26 Aug 2023 17:05), Max: 37.2 (26 Aug 2023 06:00)  T(F): 98.3 (26 Aug 2023 17:05), Max: 98.9 (26 Aug 2023 06:00)  HR: 85 (26 Aug 2023 17:05) (71 - 91)  BP: 152/84 (26 Aug 2023 17:05) (114/75 - 166/74)  BP(mean): --  ABP: --  ABP(mean): --  RR: 18 (26 Aug 2023 17:05) (13 - 21)  SpO2: 99% (26 Aug 2023 17:05) (87% - 100%)    O2 Parameters below as of 26 Aug 2023 17:05  Patient On (Oxygen Delivery Method): nasal cannula  O2 Flow (L/min): 2            CAPILLARY BLOOD GLUCOSE      POCT Blood Glucose.: 77 mg/dL (26 Aug 2023 15:57)      PHYSICAL EXAM:  General: comfortable in bed on RA  HEENT: L conjunctivitis/discharge  Respiratory:  CTAB  Cardiovascular:  rrr  Extremities: wark  Neurological: aox2      HOSPITAL MEDICATIONS:  aspirin enteric coated 81 milliGRAM(s) Oral daily    piperacillin/tazobactam IVPB.. 3.375 Gram(s) IV Intermittent every 12 hours      atorvastatin 80 milliGRAM(s) Oral at bedtime      acetaminophen     Tablet .. 650 milliGRAM(s) Oral every 6 hours PRN  melatonin 3 milliGRAM(s) Oral at bedtime PRN  ondansetron Injectable 4 milliGRAM(s) IV Push every 8 hours PRN    aluminum hydroxide/magnesium hydroxide/simethicone Suspension 30 milliLiter(s) Oral every 4 hours PRN  lactulose Syrup 20 Gram(s) Oral at bedtime  pantoprazole    Tablet 40 milliGRAM(s) Oral before breakfast  senna 2 Tablet(s) Oral at bedtime        calcium acetate 667 milliGRAM(s) Oral three times a day with meals      latanoprost 0.005% Ophthalmic Solution 1 Drop(s) Both EYES at bedtime  prednisoLONE acetate 1% Suspension 1 Drop(s) Both EYES two times a day        LABS:                        12.6   6.20  )-----------( 126      ( 26 Aug 2023 09:55 )             41.1     Hgb Trend: 12.6<--, 12.5<--  08-26    x   |  x   |  x   ----------------------------<  x   4.7   |  x   |  x     Ca    9.1      26 Aug 2023 09:55  Phos  2.5     08-26  Mg     2.60     08-26    TPro  8.4<H>  /  Alb  3.7  /  TBili  0.4  /  DBili  x   /  AST  26  /  ALT  12  /  AlkPhos  118  08-25    Creatinine Trend: 3.00<--, 2.16<--  PT/INR - ( 26 Aug 2023 09:55 )   PT: 11.3 sec;   INR: 1.00 ratio         PTT - ( 26 Aug 2023 09:55 )  PTT:36.2 sec  Urinalysis Basic - ( 26 Aug 2023 09:55 )    Color: x / Appearance: x / SG: x / pH: x  Gluc: 136 mg/dL / Ketone: x  / Bili: x / Urobili: x   Blood: x / Protein: x / Nitrite: x   Leuk Esterase: x / RBC: x / WBC x   Sq Epi: x / Non Sq Epi: x / Bacteria: x        Venous Blood Gas:  08-25 @ 19:58  7.48/49/76/36/96.2  VBG Lactate: 1.6      MICROBIOLOGY:     RADIOLOGY:  [x ] Reviewed and interpreted by me    Bedside POCUS with bilateral pleural effusions, L>R, both simple. Predominantly A line pattern.

## 2023-08-26 NOTE — H&P ADULT - PROBLEM SELECTOR PLAN 6
hold home nifedipine 60mg qd and labetalol 200mg tid for now given normotensive and in s.o possible infection  monitor bp closely and resume home meds as needed

## 2023-08-26 NOTE — H&P ADULT - NSHPLABSRESULTS_GEN_ALL_CORE
LABS:                        12.6   6.20  )-----------( 126      ( 26 Aug 2023 09:55 )             41.1     08-26    131<L>  |  90<L>  |  35<H>  ----------------------------<  136<H>  5.9<H>   |  26  |  3.00<H>    Ca    9.1      26 Aug 2023 09:55  Phos  2.5     08-26  Mg     2.60     08-26    TPro  8.4<H>  /  Alb  3.7  /  TBili  0.4  /  DBili  x   /  AST  26  /  ALT  12  /  AlkPhos  118  08-25    PT/INR - ( 26 Aug 2023 09:55 )   PT: 11.3 sec;   INR: 1.00 ratio         PTT - ( 26 Aug 2023 09:55 )  PTT:36.2 sec  Urinalysis Basic - ( 26 Aug 2023 09:55 )    Color: x / Appearance: x / SG: x / pH: x  Gluc: 136 mg/dL / Ketone: x  / Bili: x / Urobili: x   Blood: x / Protein: x / Nitrite: x   Leuk Esterase: x / RBC: x / WBC x   Sq Epi: x / Non Sq Epi: x / Bacteria: x      CAPILLARY BLOOD GLUCOSE      POCT Blood Glucose.: 110 mg/dL (26 Aug 2023 10:21)      RADIOLOGY & ADDITIONAL TESTS: Reviewed.    CT Chest:  IMPRESSION:  Bilateral pleural effusions, left greater than right. Associated   compressive atelectasis on the left. Superimposed pneumonia should be   evaluated for clinical basis.    Emphysema.    Borderline heart size.    Shotty mediastinal lymphadenopathy of uncertain significance.    Additional findings as above.

## 2023-08-26 NOTE — CONSULT NOTE ADULT - ATTENDING COMMENTS
Patient is a 67 yo M w/ ESRD on HD (LUE AVF, MWF), functional quadriplegia (R sided BKA + L sided AKA,), blindness, CAD, HTN, HLD, Chronic hypoxemic respiratory failure (2L home O2) who was initialyl admitted for n/v at HD. Pulmonary consulted for pleural effusions and nocturnal desaturation.     #Hypoxemic respiratory failure - Chronic, unclear associated diagnosis, currently on RA, saturating well. CT chest with bilateral pleural effusions, L > R, recurrent in nature. Most likely in setting of chronic volume overload. Noted thoracentesis of L also for large pleural effusion in 6/2023, transudative with negative cyto x1. CT chest also with mosaic attenuation and possible 7 mm left apical pulmonary nodule.    #Pleural effusions - Recurrent, likely 2/2 chronic volume overload.  #Nocturnal hypoxemia - Rule out SDB  #Diastolic and systolic HF  - Recommend increased fluid removal during HD, likely chronic volume overload and diastolic/systolic HF  - No role for thoracentesis at this time  - Can follow up with pulmonary as outpatient for PSG, can continue with nocturnal O2    Diego Sam MD  Pulmonary & Critical Care

## 2023-08-26 NOTE — H&P ADULT - NSHPSOCIALHISTORY_GEN_ALL_CORE
Pt is ANO1-2 at baseline. Lives at home with family (sister?). States has HHA. dependent on ADLs. non-ambulatory. Pt is ANO1-2 at baseline. Pt is a long term resident at [facility]. dependent on ADLs. non-ambulatory.

## 2023-08-26 NOTE — H&P ADULT - PROBLEM SELECTOR PLAN 4
ESRD on HD MWF. last HD on 8/25.    - nephrology consulted   - cont HD per schedule per Renal  - cont home meds   -hold anti-HTN for now, resume as needed ESRD on HD MWF. last HD on 8/25.    - nephrology consulted   - cont HD per schedule per Renal  - cont home meds   - hold anti-HTN for now, resume as needed

## 2023-08-26 NOTE — H&P ADULT - PROBLEM SELECTOR PLAN 5
likely in s.o NSTEMI II. denies chest pain, ekg wo ischemic changes.   -will cont to trend, repeat at 3pm  -prior Echo in june showed 40-45%LVEF, mild systolic dysfunction  -if continues to trend up then will obtain cardiology consult likely in s.o NSTEMI II. denies chest pain, ekg wo ischemic changes. CKMB wnl.   -will cont to trend, repeat at 3pm  -prior Echo in june showed 40-45%LVEF, mild systolic dysfunction  -if continues to trend up then will obtain cardiology consult

## 2023-08-26 NOTE — H&P ADULT - ASSESSMENT
67 yo male with pmhx of ESRD on HD (LUE AVF, MWF), functional quadriplegia (R sided BKA + L sided AKA,), blindness, CAD, HTN, HLD, on 2L home O2 who presents to the ED for complaints of nausea and noted to be in acute hypoxic resp failure  2/2 left large pleural effusion with possible superimposed PNA.

## 2023-08-26 NOTE — H&P ADULT - CONVERSATION DETAILS
Spoke to Dung Vincent over the phone at [phone]. Ramonita states she is the HCP for the patient. Patient has his children abroad in  and [place]. currently pt is Full code as previously discussed. wants to pursue aggressive work up and treatment as indicated.

## 2023-08-26 NOTE — H&P ADULT - PROBLEM SELECTOR PLAN 2
pw acute hypoxia, previous hx of transudative pleural effusion s/p thoracentesis in 6/23. fluid analysis negative that time. Currently CT Chest shows bilateral pleural effusion L>R, possible loculated, possible superimposed PNA.  possible etiology transudative 2/2 ESRD, possible CHF, other ddx include exudative process 2/2 parapneumonic vs. empyema, malignancy cannot be ruled out.     - cont with vanc 1g daily fu vanc random  - cont with zosyn   - fu blood culture/sputum culture  - pulmonology consulted for thoracentesis   - pt unable to consent, family is unreachable (will possibly need 2 PC consent)  - monitor WBC and fever curve pw acute hypoxia, previous hx of transudative pleural effusion s/p thoracentesis in 6/23. fluid analysis negative that time. Currently CT Chest shows bilateral pleural effusion L>R, possible loculated, possible superimposed PNA.  possible etiology transudative 2/2 ESRD, possible CHF, other ddx include exudative process 2/2 parapneumonic vs. empyema, malignancy cannot be ruled out.     - cont with vanc 1g daily fu vanc random  - cont with zosyn   - fu blood culture/sputum culture  - pulmonology consulted for thoracentesis (if consent needed, contact HCP/niece: Ramonita Carlton at [phone])  - monitor WBC and fever curve

## 2023-08-26 NOTE — H&P ADULT - PROBLEM SELECTOR PLAN 3
ua with bacteria/pyuria and LE.     - fu urine culture  - cont with zosyn as above  - monitor WBC, fever curve

## 2023-08-26 NOTE — H&P ADULT - PROBLEM SELECTOR PLAN 7
SHRUTHI  - frequent repositioning  -SW and PT consult SHRUTHI  - frequent repositioning  - SW and PT consult

## 2023-08-26 NOTE — CONSULT NOTE ADULT - ASSESSMENT
69 yo male with pmhx of ESRD on HD (LUE AVF, MWF), functional quadriplegia (R sided BKA + L sided AKA,), blindness, CAD, HTN, HLD, on 2L home O2 admitted for hypoxia although upon admission on RA and comfortable.    Imaging and Bedside POCUS with bilateral pleural effusions, L>R, both simple. Predominantly A line pattern. Reported desaturations while asleep only.    - c/w HD per schedule and nephro and can consider more fluid removal  - no thoracentesis as patient not dyspneic, and also recent prior dx thora (transudate, unremarkable micro/cyt)  - please refer for sleep study upon discharge as history strongly suggests TAYLER  - consider ophtho referral for unilateral ocular conjunctivitis/discharge    D/w Dr Flaco Otero MD
ESRD:  On HD [facility] at  [facility].  Last HD was yesterday. He has an effusion but is not in Pulmonary edema.  His BP is on low side.   - No need for urgent HD.  - Will attempt increased UF at HD. However, this may take a long time to control effusion and he may become hypotensive.  - Renal Diet.  - HD on MWF schedule.    Pleural Effusion:  - Pulmonary Follow up.  - May need Thoracocentesis.      Urinary Tract Infection:  _ continue antibiotics.

## 2023-08-26 NOTE — PATIENT PROFILE ADULT - HAVE YOU RECENTLY LOST WEIGHT WITHOUT TRYING?
Anesthesia Post Op Note


Date & Time


May 2, 2017 at 11:38





Vital Signs


Pain Intensity:  0





 Vital Signs Past 12 Hours








  Date Time  Temp Pulse Resp B/P Pulse Ox O2 Delivery O2 Flow Rate FiO2


 


5/2/17 11:14 36.5 64 16 125/82 95 Room Air  


 


5/2/17 09:50 36.6 66 20 167/100 95 Room Air  











Notes


Mental Status:  alert / awake / arousable, participated in evaluation


Pt Amnestic to Procedure:  Yes


Nausea / Vomiting:  adequately controlled


Pain:  adequately controlled


Airway Patency, RR, SpO2:  stable & adequate


BP & HR:  stable & adequate


Hydration State:  stable & adequate


Anesthetic Complications:  no major complications apparent
No (0)

## 2023-08-27 DIAGNOSIS — Z91.89 OTHER SPECIFIED PERSONAL RISK FACTORS, NOT ELSEWHERE CLASSIFIED: ICD-10-CM

## 2023-08-27 LAB
ANION GAP SERPL CALC-SCNC: 12 MMOL/L — SIGNIFICANT CHANGE UP (ref 7–14)
BUN SERPL-MCNC: 47 MG/DL — HIGH (ref 7–23)
CALCIUM SERPL-MCNC: 9.6 MG/DL — SIGNIFICANT CHANGE UP (ref 8.4–10.5)
CHLORIDE SERPL-SCNC: 90 MMOL/L — LOW (ref 98–107)
CO2 SERPL-SCNC: 30 MMOL/L — SIGNIFICANT CHANGE UP (ref 22–31)
CREAT SERPL-MCNC: 4.09 MG/DL — HIGH (ref 0.5–1.3)
EGFR: 15 ML/MIN/1.73M2 — LOW
GLUCOSE BLDC GLUCOMTR-MCNC: 108 MG/DL — HIGH (ref 70–99)
GLUCOSE BLDC GLUCOMTR-MCNC: 135 MG/DL — HIGH (ref 70–99)
GLUCOSE BLDC GLUCOMTR-MCNC: 197 MG/DL — HIGH (ref 70–99)
GLUCOSE BLDC GLUCOMTR-MCNC: 63 MG/DL — LOW (ref 70–99)
GLUCOSE BLDC GLUCOMTR-MCNC: 67 MG/DL — LOW (ref 70–99)
GLUCOSE BLDC GLUCOMTR-MCNC: 69 MG/DL — LOW (ref 70–99)
GLUCOSE BLDC GLUCOMTR-MCNC: 78 MG/DL — SIGNIFICANT CHANGE UP (ref 70–99)
GLUCOSE SERPL-MCNC: 53 MG/DL — CRITICAL LOW (ref 70–99)
MAGNESIUM SERPL-MCNC: 2.9 MG/DL — HIGH (ref 1.6–2.6)
PHOSPHATE SERPL-MCNC: 3.5 MG/DL — SIGNIFICANT CHANGE UP (ref 2.5–4.5)
POTASSIUM SERPL-MCNC: 5.2 MMOL/L — SIGNIFICANT CHANGE UP (ref 3.5–5.3)
POTASSIUM SERPL-SCNC: 5.2 MMOL/L — SIGNIFICANT CHANGE UP (ref 3.5–5.3)
SODIUM SERPL-SCNC: 132 MMOL/L — LOW (ref 135–145)
VANCOMYCIN FLD-MCNC: 16.9 UG/ML — SIGNIFICANT CHANGE UP

## 2023-08-27 PROCEDURE — 99232 SBSQ HOSP IP/OBS MODERATE 35: CPT

## 2023-08-27 RX ORDER — DEXTROSE 50 % IN WATER 50 %
12.5 SYRINGE (ML) INTRAVENOUS ONCE
Refills: 0 | Status: COMPLETED | OUTPATIENT
Start: 2023-08-27 | End: 2023-08-27

## 2023-08-27 RX ADMIN — LACTULOSE 20 GRAM(S): 10 SOLUTION ORAL at 21:21

## 2023-08-27 RX ADMIN — Medication 81 MILLIGRAM(S): at 11:49

## 2023-08-27 RX ADMIN — Medication 1 DROP(S): at 17:28

## 2023-08-27 RX ADMIN — Medication 667 MILLIGRAM(S): at 17:32

## 2023-08-27 RX ADMIN — HEPARIN SODIUM 5000 UNIT(S): 5000 INJECTION INTRAVENOUS; SUBCUTANEOUS at 17:32

## 2023-08-27 RX ADMIN — Medication 12.5 GRAM(S): at 11:38

## 2023-08-27 RX ADMIN — PIPERACILLIN AND TAZOBACTAM 25 GRAM(S): 4; .5 INJECTION, POWDER, LYOPHILIZED, FOR SOLUTION INTRAVENOUS at 01:53

## 2023-08-27 RX ADMIN — PIPERACILLIN AND TAZOBACTAM 25 GRAM(S): 4; .5 INJECTION, POWDER, LYOPHILIZED, FOR SOLUTION INTRAVENOUS at 13:14

## 2023-08-27 RX ADMIN — HEPARIN SODIUM 5000 UNIT(S): 5000 INJECTION INTRAVENOUS; SUBCUTANEOUS at 05:36

## 2023-08-27 RX ADMIN — Medication 1 DROP(S): at 05:35

## 2023-08-27 RX ADMIN — Medication 667 MILLIGRAM(S): at 11:38

## 2023-08-27 RX ADMIN — Medication 667 MILLIGRAM(S): at 08:32

## 2023-08-27 RX ADMIN — Medication 12.5 GRAM(S): at 07:16

## 2023-08-27 RX ADMIN — LATANOPROST 1 DROP(S): 0.05 SOLUTION/ DROPS OPHTHALMIC; TOPICAL at 21:21

## 2023-08-27 RX ADMIN — ATORVASTATIN CALCIUM 80 MILLIGRAM(S): 80 TABLET, FILM COATED ORAL at 21:21

## 2023-08-27 NOTE — SWALLOW BEDSIDE ASSESSMENT ADULT - ADDITIONAL RECOMMENDATIONS
Medical team further advised to reconsult if there is a change in medical status. This department remains available to reassess if patient is willing to participate with PO trials.

## 2023-08-27 NOTE — PROGRESS NOTE ADULT - COMMENTS
"Requested Prescriptions   Pending Prescriptions Disp Refills     amLODIPine (NORVASC) 5 MG tablet [Pharmacy Med Name: AMLODIPINE BESYLATE 5MG TABS] 180 tablet 3     Sig: TAKE 2 TABLETS BY MOUTH ONCE DAILY   Last Written Prescription Date:  10/24/19  Last Fill Quantity: 180,  # refills: 3   Last office visit: 10/24/2019 with prescribing provider   Future Office Visit:        Calcium Channel Blockers Protocol  Passed - 1/7/2020  8:55 AM        Passed - Blood pressure under 140/90 in past 12 months     BP Readings from Last 3 Encounters:   12/03/19 123/83   11/26/19 134/82   10/24/19 132/78                 Passed - Recent (12 mo) or future (30 days) visit within the authorizing provider's specialty     Patient has had an office visit with the authorizing provider or a provider within the authorizing providers department within the previous 12 mos or has a future within next 30 days. See \"Patient Info\" tab in inbasket, or \"Choose Columns\" in Meds & Orders section of the refill encounter.              Passed - Medication is active on med list        Passed - Patient is age 18 or older        Passed - Normal serum creatinine on file in past 12 months     Recent Labs   Lab Test 10/24/19  0933   CR 0.94             Prescription approved per [facility] Refill Protocol.  Arlene Ramon RN on 1/7/2020 at 9:37 AM    " Not assessed fully.

## 2023-08-27 NOTE — SWALLOW BEDSIDE ASSESSMENT ADULT - COMMENTS
Progress Note- Hospitalist 8/27: "69 yo male with pmhx of ESRD on HD (LUE AVF, MWF), functional quadriplegia (R sided BKA + L sided AKA,), blindness, CAD, HTN, HLD, on 2L home O2 who presents to the ED for complaints of nausea and noted to be in acute hypoxic resp failure  2/2 left large pleural effusion with possible superimposed PNA."    Patient seen awake/alert during clinical swallow evaluation this PM. Patient is Malian Creole speaking, Language [employer] utilized (ID #[id number]) for translation. Patient with basic English proficiency as well and noted to respond to prompts in English as well. Patient with adamant refusal for swallow evaluation/PO trials stating, "No, put my head down!" as SLP was raising patient's head of bed for PO trial administration. Per , patient states, "No, I need to sleep" when prompted to begin swallow evaluation.

## 2023-08-27 NOTE — SWALLOW BEDSIDE ASSESSMENT ADULT - SWALLOW EVAL: DIAGNOSIS
Patient with adamant refusal for swallow evaluation despite verbal encouragement therefore oral and pharyngeal stage could not be assessed.

## 2023-08-28 LAB
ANION GAP SERPL CALC-SCNC: 15 MMOL/L — HIGH (ref 7–14)
BUN SERPL-MCNC: 58 MG/DL — HIGH (ref 7–23)
CALCIUM SERPL-MCNC: 9.4 MG/DL — SIGNIFICANT CHANGE UP (ref 8.4–10.5)
CHLORIDE SERPL-SCNC: 91 MMOL/L — LOW (ref 98–107)
CO2 SERPL-SCNC: 29 MMOL/L — SIGNIFICANT CHANGE UP (ref 22–31)
CREAT SERPL-MCNC: 5.55 MG/DL — HIGH (ref 0.5–1.3)
DIALYSIS INSTRUMENT RESULT - HEPATITIS B SURFACE ANTIGEN: NEGATIVE — SIGNIFICANT CHANGE UP
EGFR: 10 ML/MIN/1.73M2 — LOW
GLUCOSE BLDC GLUCOMTR-MCNC: 101 MG/DL — HIGH (ref 70–99)
GLUCOSE BLDC GLUCOMTR-MCNC: 105 MG/DL — HIGH (ref 70–99)
GLUCOSE BLDC GLUCOMTR-MCNC: 108 MG/DL — HIGH (ref 70–99)
GLUCOSE BLDC GLUCOMTR-MCNC: 117 MG/DL — HIGH (ref 70–99)
GLUCOSE BLDC GLUCOMTR-MCNC: 135 MG/DL — HIGH (ref 70–99)
GLUCOSE BLDC GLUCOMTR-MCNC: 65 MG/DL — LOW (ref 70–99)
GLUCOSE BLDC GLUCOMTR-MCNC: 70 MG/DL — SIGNIFICANT CHANGE UP (ref 70–99)
GLUCOSE BLDC GLUCOMTR-MCNC: 70 MG/DL — SIGNIFICANT CHANGE UP (ref 70–99)
GLUCOSE BLDC GLUCOMTR-MCNC: 71 MG/DL — SIGNIFICANT CHANGE UP (ref 70–99)
GLUCOSE BLDC GLUCOMTR-MCNC: 71 MG/DL — SIGNIFICANT CHANGE UP (ref 70–99)
GLUCOSE BLDC GLUCOMTR-MCNC: 80 MG/DL — SIGNIFICANT CHANGE UP (ref 70–99)
GLUCOSE BLDC GLUCOMTR-MCNC: 80 MG/DL — SIGNIFICANT CHANGE UP (ref 70–99)
GLUCOSE BLDC GLUCOMTR-MCNC: 84 MG/DL — SIGNIFICANT CHANGE UP (ref 70–99)
GLUCOSE BLDC GLUCOMTR-MCNC: 97 MG/DL — SIGNIFICANT CHANGE UP (ref 70–99)
GLUCOSE SERPL-MCNC: 64 MG/DL — LOW (ref 70–99)
HCT VFR BLD CALC: 38.9 % — LOW (ref 39–50)
HGB BLD-MCNC: 12.4 G/DL — LOW (ref 13–17)
MAGNESIUM SERPL-MCNC: 3.1 MG/DL — HIGH (ref 1.6–2.6)
MCHC RBC-ENTMCNC: 28.8 PG — SIGNIFICANT CHANGE UP (ref 27–34)
MCHC RBC-ENTMCNC: 31.9 GM/DL — LOW (ref 32–36)
MCV RBC AUTO: 90.3 FL — SIGNIFICANT CHANGE UP (ref 80–100)
NRBC # BLD: 0 /100 WBCS — SIGNIFICANT CHANGE UP (ref 0–0)
NRBC # FLD: 0 K/UL — SIGNIFICANT CHANGE UP (ref 0–0)
PHOSPHATE SERPL-MCNC: 4.2 MG/DL — SIGNIFICANT CHANGE UP (ref 2.5–4.5)
PLATELET # BLD AUTO: 174 K/UL — SIGNIFICANT CHANGE UP (ref 150–400)
POTASSIUM SERPL-MCNC: 5.2 MMOL/L — SIGNIFICANT CHANGE UP (ref 3.5–5.3)
POTASSIUM SERPL-SCNC: 5.2 MMOL/L — SIGNIFICANT CHANGE UP (ref 3.5–5.3)
RBC # BLD: 4.31 M/UL — SIGNIFICANT CHANGE UP (ref 4.2–5.8)
RBC # FLD: 17.1 % — HIGH (ref 10.3–14.5)
SODIUM SERPL-SCNC: 135 MMOL/L — SIGNIFICANT CHANGE UP (ref 135–145)
VANCOMYCIN FLD-MCNC: 16.4 UG/ML — SIGNIFICANT CHANGE UP
WBC # BLD: 5.21 K/UL — SIGNIFICANT CHANGE UP (ref 3.8–10.5)
WBC # FLD AUTO: 5.21 K/UL — SIGNIFICANT CHANGE UP (ref 3.8–10.5)

## 2023-08-28 PROCEDURE — 99233 SBSQ HOSP IP/OBS HIGH 50: CPT

## 2023-08-28 RX ORDER — DEXTROSE 50 % IN WATER 50 %
12.5 SYRINGE (ML) INTRAVENOUS ONCE
Refills: 0 | Status: COMPLETED | OUTPATIENT
Start: 2023-08-28 | End: 2023-08-28

## 2023-08-28 RX ORDER — CHLORHEXIDINE GLUCONATE 213 G/1000ML
1 SOLUTION TOPICAL DAILY
Refills: 0 | Status: DISCONTINUED | OUTPATIENT
Start: 2023-08-28 | End: 2023-08-29

## 2023-08-28 RX ORDER — SODIUM CHLORIDE 9 MG/ML
100 INJECTION INTRAMUSCULAR; INTRAVENOUS; SUBCUTANEOUS
Refills: 0 | Status: DISCONTINUED | OUTPATIENT
Start: 2023-08-28 | End: 2023-08-29

## 2023-08-28 RX ADMIN — PIPERACILLIN AND TAZOBACTAM 25 GRAM(S): 4; .5 INJECTION, POWDER, LYOPHILIZED, FOR SOLUTION INTRAVENOUS at 00:20

## 2023-08-28 RX ADMIN — LATANOPROST 1 DROP(S): 0.05 SOLUTION/ DROPS OPHTHALMIC; TOPICAL at 23:46

## 2023-08-28 RX ADMIN — Medication 1 DROP(S): at 05:21

## 2023-08-28 RX ADMIN — Medication 667 MILLIGRAM(S): at 08:00

## 2023-08-28 RX ADMIN — Medication 667 MILLIGRAM(S): at 11:18

## 2023-08-28 RX ADMIN — PIPERACILLIN AND TAZOBACTAM 25 GRAM(S): 4; .5 INJECTION, POWDER, LYOPHILIZED, FOR SOLUTION INTRAVENOUS at 12:20

## 2023-08-28 RX ADMIN — Medication 667 MILLIGRAM(S): at 17:32

## 2023-08-28 RX ADMIN — Medication 81 MILLIGRAM(S): at 11:18

## 2023-08-28 RX ADMIN — HEPARIN SODIUM 5000 UNIT(S): 5000 INJECTION INTRAVENOUS; SUBCUTANEOUS at 05:22

## 2023-08-28 RX ADMIN — Medication 1 DROP(S): at 17:32

## 2023-08-28 RX ADMIN — Medication 12.5 GRAM(S): at 02:32

## 2023-08-28 RX ADMIN — HEPARIN SODIUM 5000 UNIT(S): 5000 INJECTION INTRAVENOUS; SUBCUTANEOUS at 17:32

## 2023-08-28 RX ADMIN — CHLORHEXIDINE GLUCONATE 1 APPLICATION(S): 213 SOLUTION TOPICAL at 11:17

## 2023-08-28 NOTE — PROVIDER CONTACT NOTE (OTHER) - RECOMMENDATIONS
notify provider. wound care consult placed
Will continue to monitor. Tele monitoring.
notify provider. hold pantoprazole

## 2023-08-28 NOTE — PROVIDER CONTACT NOTE (HYPOGLYCEMIA EVENT) - NS PROVIDER CONTACT BACKGROUND-HYPO
Age: 68y    Gender: Male    POCT Blood Glucose:  135 mg/dL (08-28-23 @ 02:59)  70 mg/dL (08-28-23 @ 02:25)  65 mg/dL (08-28-23 @ 02:23)  108 mg/dL (08-27-23 @ 21:24)  78 mg/dL (08-27-23 @ 16:28)  197 mg/dL (08-27-23 @ 12:11)  69 mg/dL (08-27-23 @ 11:24)  67 mg/dL (08-27-23 @ 11:19)      eMAR:atorvastatin   80 milliGRAM(s) Oral (08-27-23 @ 21:21)    dextrose 50% Injectable   12.5 Gram(s) IV Push (08-27-23 @ 07:16)    dextrose 50% Injectable   12.5 Gram(s) IV Push (08-27-23 @ 11:38)    dextrose 50% Injectable   12.5 Gram(s) IV Push (08-28-23 @ 02:32)    
Age: 68y    Gender: Male    POCT Blood Glucose:  67 mg/dL (08-27-23 @ 11:19)  135 mg/dL (08-27-23 @ 07:43)  63 mg/dL (08-27-23 @ 07:10)  78 mg/dL (08-26-23 @ 21:04)  77 mg/dL (08-26-23 @ 15:57)      eMAR:atorvastatin   80 milliGRAM(s) Oral (08-26-23 @ 22:03)    dextrose 50% Injectable   12.5 Gram(s) IV Push (08-27-23 @ 07:16)

## 2023-08-28 NOTE — PROVIDER CONTACT NOTE (OTHER) - BACKGROUND
patient admitted with PNA pmh HTN, CKD, DM, ESRD

## 2023-08-28 NOTE — PROGRESS NOTE ADULT - PROBLEM SELECTOR PLAN 3
ua with bacteria/pyuria and LE. UA neg  - cont with zosyn as above  - monitor WBC, fever curve
Hypoglycemic on FS glucose but without symptoms  Not on diabetes medications  Was NPO yesterday in anticipation of possible procedure  Will continue to trend FS glucose and provide diet  Hypoglycemia protocol ordered

## 2023-08-28 NOTE — PROGRESS NOTE ADULT - PROBLEM SELECTOR PLAN 1
pw acute hypoxia, currently on venturi mask. CT Chest with bilateral pleural effusion L>R, possible loculation, possible superimposed PNA.  possible etiology transudative 2/2 ESRD, possible CHF, other ddx include exudative process 2/2 parapneumonic vs. empyema, malignancy cannot be ruled out.   - Cont to monitor pulse ox  - appreciate pulm recs, no indication for thoracentesis at this time  - cont with vanc 1g daily f/u vanc random  - cont with zosyn   - fu blood culture/sputum culture
likely 2/2 pleural effusion d/t fluid overload in the setting of ESRD and diastolc CHF. possible superimposed PNA   - weaned off O2 this am, currently satting mid 90s on RA  - Cont to monitor pulse ox  - appreciate pulm recs, no indication for thoracentesis at this time  - c/w vanco/zosyn. will change abx to oral on dc to complete total 7 day course

## 2023-08-28 NOTE — PROGRESS NOTE ADULT - PROBLEM SELECTOR PLAN 2
Hypoglycemic on FS glucose but without symptoms  Not on diabetes medications  encourage oral intake   Will continue to trend FS glucose and provide diet  Hypoglycemia protocol ordered
pw acute hypoxia, previous hx of transudative pleural effusion s/p thoracentesis in 6/23. fluid analysis negative that time. Currently CT Chest shows bilateral pleural effusion L>R, possible loculated, possible superimposed PNA.  possible etiology transudative 2/2 ESRD, possible CHF, other ddx include exudative process 2/2 parapneumonic vs. empyema, malignancy cannot be ruled out.     - cont with vanc 1g daily fu vanc random  - cont with zosyn   - fu blood culture/sputum culture  - appreciate pulm recs  - monitor WBC and fever curve

## 2023-08-28 NOTE — PROVIDER CONTACT NOTE (OTHER) - SITUATION
held morning pantoprazole because its don't crush or chew and patient takes his meds crushed
Patient had 5 beats vtach
patient has a skin tear on his back. wound care consult order put in

## 2023-08-28 NOTE — PROVIDER CONTACT NOTE (OTHER) - ASSESSMENT
A&Ox2 no s/s of acute distress.
/80, HR 86, RR 17, oxygen 100% on RA, temp 98.3 orally. Patient asymptomatic, denies SOB, chest pain, palpitations or dizziness.
A&Ox2 no s/s of acute distress. skin tear on back. foam placed

## 2023-08-28 NOTE — PROGRESS NOTE ADULT - PROBLEM SELECTOR PLAN 6
hold home nifedipine 60mg qd and labetalol 200mg tid for now given normotensive and in s.o possible infection  monitor bp closely and resume home meds as needed
likely in s.o NSTEMI II. denies chest pain, ekg wo ischemic changes. CKMB wnl.   -prior Echo in june showed 40-45%LVEF, mild systolic dysfunction  -if continues to trend up or developed chest pain then will obtain cardiology consult

## 2023-08-28 NOTE — PROGRESS NOTE ADULT - PROBLEM SELECTOR PLAN 4
ESRD on HD MWF.   - nephrology following   - cont HD per schedule per Renal  - cont home meds   - hold anti-HTN for now, resume as needed
ua with bacteria/pyuria and LE.     - fu urine culture  - cont with zosyn as above  - monitor WBC, fever curve

## 2023-08-28 NOTE — PROGRESS NOTE ADULT - PROBLEM SELECTOR PLAN 7
- frequent repositioning
hold home nifedipine 60mg qd and labetalol 200mg tid for now given normotensive and in s.o possible infection  monitor bp closely and resume home meds as needed

## 2023-08-28 NOTE — PROGRESS NOTE ADULT - PROBLEM SELECTOR PLAN 5
likely in s.o NSTEMI II. denies chest pain, ekg wo ischemic changes. CKMB wnl.   -prior Echo in june showed 40-45%LVEF, mild systolic dysfunction  -cont to monitor
ESRD on HD MWF. last HD on 8/25.    - nephrology consulted   - cont HD per schedule per Renal  - cont home meds   - hold anti-HTN for now, resume as needed

## 2023-08-28 NOTE — PROVIDER CONTACT NOTE (OTHER) - REASON
patient has a skin tear on his back. wound care consult order put in
5 beats vtach
held morning pantoprazole because its don't crush or chew and patient takes his meds crushed

## 2023-08-29 ENCOUNTER — NON-APPOINTMENT (OUTPATIENT)
Age: 68
End: 2023-08-29

## 2023-08-29 ENCOUNTER — TRANSCRIPTION ENCOUNTER (OUTPATIENT)
Age: 68
End: 2023-08-29

## 2023-08-29 ENCOUNTER — APPOINTMENT (OUTPATIENT)
Dept: ELECTROPHYSIOLOGY | Facility: CLINIC | Age: 68
End: 2023-08-29
Payer: MEDICAID

## 2023-08-29 VITALS
OXYGEN SATURATION: 100 % | SYSTOLIC BLOOD PRESSURE: 153 MMHG | HEART RATE: 81 BPM | TEMPERATURE: 98 F | RESPIRATION RATE: 19 BRPM | DIASTOLIC BLOOD PRESSURE: 81 MMHG

## 2023-08-29 LAB
ANION GAP SERPL CALC-SCNC: 16 MMOL/L — HIGH (ref 7–14)
BUN SERPL-MCNC: 27 MG/DL — HIGH (ref 7–23)
CALCIUM SERPL-MCNC: 8.5 MG/DL — SIGNIFICANT CHANGE UP (ref 8.4–10.5)
CHLORIDE SERPL-SCNC: 87 MMOL/L — LOW (ref 98–107)
CO2 SERPL-SCNC: 28 MMOL/L — SIGNIFICANT CHANGE UP (ref 22–31)
CREAT SERPL-MCNC: 3.57 MG/DL — HIGH (ref 0.5–1.3)
EGFR: 18 ML/MIN/1.73M2 — LOW
GLUCOSE BLDC GLUCOMTR-MCNC: 115 MG/DL — HIGH (ref 70–99)
GLUCOSE BLDC GLUCOMTR-MCNC: 72 MG/DL — SIGNIFICANT CHANGE UP (ref 70–99)
GLUCOSE BLDC GLUCOMTR-MCNC: 91 MG/DL — SIGNIFICANT CHANGE UP (ref 70–99)
GLUCOSE SERPL-MCNC: 247 MG/DL — HIGH (ref 70–99)
HBV CORE IGM SER-ACNC: SIGNIFICANT CHANGE UP
HBV SURFACE AB SER-ACNC: <3 MIU/ML — LOW
HBV SURFACE AG SER-ACNC: SIGNIFICANT CHANGE UP
HCT VFR BLD CALC: 33.6 % — LOW (ref 39–50)
HCV AB S/CO SERPL IA: 0.1 S/CO — SIGNIFICANT CHANGE UP (ref 0–0.99)
HCV AB SERPL-IMP: SIGNIFICANT CHANGE UP
HGB BLD-MCNC: 10.6 G/DL — LOW (ref 13–17)
MAGNESIUM SERPL-MCNC: 2.5 MG/DL — SIGNIFICANT CHANGE UP (ref 1.6–2.6)
MCHC RBC-ENTMCNC: 28.1 PG — SIGNIFICANT CHANGE UP (ref 27–34)
MCHC RBC-ENTMCNC: 31.5 GM/DL — LOW (ref 32–36)
MCV RBC AUTO: 89.1 FL — SIGNIFICANT CHANGE UP (ref 80–100)
MRSA PCR RESULT.: SIGNIFICANT CHANGE UP
NRBC # BLD: 0 /100 WBCS — SIGNIFICANT CHANGE UP (ref 0–0)
NRBC # FLD: 0 K/UL — SIGNIFICANT CHANGE UP (ref 0–0)
PHOSPHATE SERPL-MCNC: 2.5 MG/DL — SIGNIFICANT CHANGE UP (ref 2.5–4.5)
PLATELET # BLD AUTO: 163 K/UL — SIGNIFICANT CHANGE UP (ref 150–400)
POTASSIUM SERPL-MCNC: 4.5 MMOL/L — SIGNIFICANT CHANGE UP (ref 3.5–5.3)
POTASSIUM SERPL-SCNC: 4.5 MMOL/L — SIGNIFICANT CHANGE UP (ref 3.5–5.3)
RBC # BLD: 3.77 M/UL — LOW (ref 4.2–5.8)
RBC # FLD: 17.2 % — HIGH (ref 10.3–14.5)
S AUREUS DNA NOSE QL NAA+PROBE: SIGNIFICANT CHANGE UP
SODIUM SERPL-SCNC: 131 MMOL/L — LOW (ref 135–145)
WBC # BLD: 4.49 K/UL — SIGNIFICANT CHANGE UP (ref 3.8–10.5)
WBC # FLD AUTO: 4.49 K/UL — SIGNIFICANT CHANGE UP (ref 3.8–10.5)

## 2023-08-29 PROCEDURE — 99232 SBSQ HOSP IP/OBS MODERATE 35: CPT | Mod: GC

## 2023-08-29 PROCEDURE — 99239 HOSP IP/OBS DSCHRG MGMT >30: CPT

## 2023-08-29 RX ORDER — ACETAMINOPHEN 500 MG
2 TABLET ORAL
Qty: 0 | Refills: 0 | DISCHARGE
Start: 2023-08-29

## 2023-08-29 RX ORDER — LEVOFLOXACIN 5 MG/ML
1 INJECTION, SOLUTION INTRAVENOUS
Qty: 3 | Refills: 0
Start: 2023-08-29 | End: 2023-08-31

## 2023-08-29 RX ADMIN — Medication 81 MILLIGRAM(S): at 11:46

## 2023-08-29 RX ADMIN — CHLORHEXIDINE GLUCONATE 1 APPLICATION(S): 213 SOLUTION TOPICAL at 11:53

## 2023-08-29 RX ADMIN — Medication 1 DROP(S): at 05:12

## 2023-08-29 RX ADMIN — PIPERACILLIN AND TAZOBACTAM 25 GRAM(S): 4; .5 INJECTION, POWDER, LYOPHILIZED, FOR SOLUTION INTRAVENOUS at 01:21

## 2023-08-29 RX ADMIN — HEPARIN SODIUM 5000 UNIT(S): 5000 INJECTION INTRAVENOUS; SUBCUTANEOUS at 05:16

## 2023-08-29 RX ADMIN — PIPERACILLIN AND TAZOBACTAM 25 GRAM(S): 4; .5 INJECTION, POWDER, LYOPHILIZED, FOR SOLUTION INTRAVENOUS at 13:33

## 2023-08-29 RX ADMIN — PANTOPRAZOLE SODIUM 40 MILLIGRAM(S): 20 TABLET, DELAYED RELEASE ORAL at 05:16

## 2023-08-29 RX ADMIN — Medication 667 MILLIGRAM(S): at 11:45

## 2023-08-29 NOTE — DISCHARGE NOTE PROVIDER - NSDCMRMEDTOKEN_GEN_ALL_CORE_FT
acetaminophen 325 mg oral tablet: 2 tab(s) orally every 6 hours As needed Temp greater or equal to 38C (100.4F), Mild Pain (1 - 3)  aspirin 81 mg oral delayed release tablet: 1 tab(s) orally once a day  atorvastatin 80 mg oral tablet: 1 tab(s) orally once a day (at bedtime)  calcium acetate 667 mg oral tablet: 1 tab(s) orally 3 times a day  labetalol 200 mg oral tablet: 1 tab(s) orally 3 times a day(6am, 2pm,10pm)  lactulose 10 g/15 mL oral syrup: 30 milliliter(s) orally once a day (at bedtime)  latanoprost 0.005% ophthalmic solution: 1 drop(s) to each affected eye once a day (in the evening)  levoFLOXacin 750 mg oral tablet: 1 tab(s) orally once a day to finish 7 day course (completed 4 days IV antibiotics, continue with 3 more days of oral Levaquin)  NIFEdipine 60 mg oral tablet, extended release: 1 tab(s) orally once a day (at bedtime)  pantoprazole 40 mg oral delayed release tablet: 1 tab(s) orally once a day  Pred Forte 1% ophthalmic suspension: 1 drop(s) to each lt eye 2 times a day  senna oral tablet: 2 tab(s) orally once a day (at bedtime)   acetaminophen 325 mg oral tablet: 2 tab(s) orally every 6 hours As needed Temp greater or equal to 38C (100.4F), Mild Pain (1 - 3)  aspirin 81 mg oral delayed release tablet: 1 tab(s) orally once a day  atorvastatin 80 mg oral tablet: 1 tab(s) orally once a day (at bedtime)  calcium acetate 667 mg oral tablet: 1 tab(s) orally 3 times a day  lactulose 10 g/15 mL oral syrup: 30 milliliter(s) orally once a day (at bedtime)  latanoprost 0.005% ophthalmic solution: 1 drop(s) to each affected eye once a day (in the evening)  levoFLOXacin 750 mg oral tablet: 1 tab(s) orally once a day to finish 7 day course (completed 4 days IV antibiotics, continue with 3 more days of oral Levaquin)  NIFEdipine 60 mg oral tablet, extended release: 1 tab(s) orally once a day (at bedtime)  pantoprazole 40 mg oral delayed release tablet: 1 tab(s) orally once a day  Pred Forte 1% ophthalmic suspension: 1 drop(s) to each lt eye 2 times a day  senna oral tablet: 2 tab(s) orally once a day (at bedtime)

## 2023-08-29 NOTE — PROGRESS NOTE ADULT - SUBJECTIVE AND OBJECTIVE BOX
[facility]   MD MARIBELL CARRION MD KRISTINE SOLTANPOUR, DO ANGELA WONG, PA    TEL:  OFFICE: [phone]  From 5pm-7am Answering Service [phone]    -- RENAL FOLLOW UP NOTE ---Date of Service 08-29-23 @ 13:24    Patient is a 68y old  Male who presents with a chief complaint of Nausea and hypoxia (29 Aug 2023 10:42)      Patient seen and examined at bedside. No chest pain/sob    VITALS:  T(F): 97.5 (08-29-23 @ 10:10), Max: 98.8 (08-28-23 @ 19:30)  HR: 76 (08-29-23 @ 10:10)  BP: 150/68 (08-29-23 @ 10:10)  RR: 20 (08-29-23 @ 10:10)  SpO2: 97% (08-29-23 @ 10:10)  Wt(kg): --    08-28 @ 07:01  -  08-29 @ 07:00  --------------------------------------------------------  IN: 400 mL / OUT: 2250 mL / NET: -1850 mL          PHYSICAL EXAM:  General: NAD  Neck: No JVD  Respiratory: CTAB, no wheezes, rales or rhonchi  Cardiovascular: S1, S2, RRR  Gastrointestinal: BS+, soft, NT/ND  Extremities: b/l [facility] Medications:   MEDICATIONS  (STANDING):  aspirin enteric coated 81 milliGRAM(s) Oral daily  atorvastatin 80 milliGRAM(s) Oral at bedtime  calcium acetate 667 milliGRAM(s) Oral three times a day with meals  chlorhexidine 2% Cloths 1 Application(s) Topical daily  dextrose 50% Injectable 25 Gram(s) IV Push once  dextrose 50% Injectable 12.5 Gram(s) IV Push once  dextrose 50% Injectable 25 Gram(s) IV Push once  dextrose Oral Gel 15 Gram(s) Oral once  glucagon  Injectable 1 milliGRAM(s) IntraMuscular once  heparin   Injectable 5000 Unit(s) SubCutaneous every 12 hours  lactulose Syrup 20 Gram(s) Oral at bedtime  latanoprost 0.005% Ophthalmic Solution 1 Drop(s) Both EYES at bedtime  pantoprazole    Tablet 40 milliGRAM(s) Oral before breakfast  piperacillin/tazobactam IVPB.. 3.375 Gram(s) IV Intermittent every 12 hours  prednisoLONE acetate 1% Suspension 1 Drop(s) Both EYES two times a day  senna 2 Tablet(s) Oral at bedtime      LABS:  08-29    131<L>  |  87<L>  |  27<H>  ----------------------------<  247<H>  4.5   |  28  |  3.57<H>    Ca    8.5      29 Aug 2023 05:20  Phos  2.5     08-29  Mg     2.50     08-29      Creatinine Trend: 3.57 <--, 5.55 <--, 4.09 <--, 3.00 <--, 2.16 <--    Phosphorus: 2.5 mg/dL (08-29 @ 05:20)                              10.6   4.49  )-----------( 163      ( 29 Aug 2023 05:20 )             33.6     Urine Studies:  Urinalysis - [08-29-23 @ 05:20]      Color  / Appearance  / SG  / pH       Gluc 247 / Ketone   / Bili  / Urobili        Blood  / Protein  / Leuk Est  / Nitrite       RBC  / WBC  / Hyaline  / Gran  / Sq Epi  / Non Sq Epi  / Bacteria       HbA1c 4.2      [07-19-19 @ 09:56]  TSH 1.790      [06-23-23 @ 11:51]        RADIOLOGY & ADDITIONAL STUDIES:  
CHIEF COMPLAINT: n/v    Interval Events: Feels well today, no SOB. HD yest with 1.85L UF removed.      REVIEW OF SYSTEMS:  Constitutional: [ ] negative [ ] fevers [ ] chills [ ] weight loss [ ] weight gain  HEENT: [ ] negative [ ] dry eyes [ ] eye irritation [ ] postnasal drip [ ] nasal congestion  CV: [ ] negative  [ ] chest pain [ ] orthopnea [ ] palpitations [ ] murmur  Resp: [ ] negative [ ] cough [ ] shortness of breath [ ] dyspnea [ ] wheezing [ ] sputum [ ] hemoptysis  GI: [ ] negative [ ] nausea [ ] vomiting [ ] diarrhea [ ] constipation [ ] abd pain [ ] dysphagia   : [ ] negative [ ] dysuria [ ] nocturia [ ] hematuria [ ] increased urinary frequency  Musculoskeletal: [ ] negative [ ] back pain [ ] myalgias [ ] arthralgias [ ] fracture  Skin: [ ] negative [ ] rash [ ] itch  Neurological: [ ] negative [ ] headache [ ] dizziness [ ] syncope [ ] weakness [ ] numbness  Psychiatric: [ ] negative [ ] anxiety [ ] depression  Endocrine: [ ] negative [ ] diabetes [ ] thyroid problem  Hematologic/Lymphatic: [ ] negative [ ] anemia [ ] bleeding problem  Allergic/Immunologic: [ ] negative [ ] itchy eyes [ ] nasal discharge [ ] hives [ ] angioedema  [x] All other systems negative  [ ] Unable to assess ROS because ________    OBJECTIVE:  ICU Vital Signs Last 24 Hrs  T(C): 36.4 (29 Aug 2023 10:10), Max: 37.1 (28 Aug 2023 17:32)  T(F): 97.5 (29 Aug 2023 10:10), Max: 98.8 (28 Aug 2023 19:30)  HR: 76 (29 Aug 2023 10:10) (76 - 85)  BP: 150/68 (29 Aug 2023 10:10) (137/69 - 158/86)  BP(mean): --  ABP: --  ABP(mean): --  RR: 20 (29 Aug 2023 10:10) (18 - 20)  SpO2: 97% (29 Aug 2023 10:10) (97% - 100%)    O2 Parameters below as of 29 Aug 2023 10:10  Patient On (Oxygen Delivery Method): room air              08-28 @ 07:01  -  08-29 @ 07:00  --------------------------------------------------------  IN: 400 mL / OUT: 2250 mL / NET: -1850 mL      CAPILLARY BLOOD GLUCOSE      POCT Blood Glucose.: 72 mg/dL (29 Aug 2023 16:20)      PHYSICAL EXAM:  General: comfortable in bed on RA  HEENT: no conjunctivitis or discharge  Respiratory:  CTAB  Cardiovascular:  rrr  Extremities: warm    HOSPITAL MEDICATIONS:  aspirin enteric coated 81 milliGRAM(s) Oral daily  heparin   Injectable 5000 Unit(s) SubCutaneous every 12 hours    piperacillin/tazobactam IVPB.. 3.375 Gram(s) IV Intermittent every 12 hours      atorvastatin 80 milliGRAM(s) Oral at bedtime  dextrose 50% Injectable 25 Gram(s) IV Push once  dextrose 50% Injectable 12.5 Gram(s) IV Push once  dextrose 50% Injectable 25 Gram(s) IV Push once  dextrose Oral Gel 15 Gram(s) Oral once  glucagon  Injectable 1 milliGRAM(s) IntraMuscular once      acetaminophen     Tablet .. 650 milliGRAM(s) Oral every 6 hours PRN  melatonin 3 milliGRAM(s) Oral at bedtime PRN  ondansetron Injectable 4 milliGRAM(s) IV Push every 8 hours PRN    aluminum hydroxide/magnesium hydroxide/simethicone Suspension 30 milliLiter(s) Oral every 4 hours PRN  lactulose Syrup 20 Gram(s) Oral at bedtime  pantoprazole    Tablet 40 milliGRAM(s) Oral before breakfast  senna 2 Tablet(s) Oral at bedtime        calcium acetate 667 milliGRAM(s) Oral three times a day with meals  sodium chloride 0.9% Bolus. 100 milliLiter(s) IV Bolus every 5 minutes PRN      chlorhexidine 2% Cloths 1 Application(s) Topical daily  latanoprost 0.005% Ophthalmic Solution 1 Drop(s) Both EYES at bedtime  prednisoLONE acetate 1% Suspension 1 Drop(s) Both EYES two times a day        LABS:                        10.6   4.49  )-----------( 163      ( 29 Aug 2023 05:20 )             33.6     Hgb Trend: 10.6<--, 12.4<--, 12.6<--, 12.5<--  08-29    131<L>  |  87<L>  |  27<H>  ----------------------------<  247<H>  4.5   |  28  |  3.57<H>    Ca    8.5      29 Aug 2023 05:20  Phos  2.5     08-29  Mg     2.50     08-29      Creatinine Trend: 3.57<--, 5.55<--, 4.09<--, 3.00<--, 2.16<--    Urinalysis Basic - ( 29 Aug 2023 05:20 )    Color: x / Appearance: x / SG: x / pH: x  Gluc: 247 mg/dL / Ketone: x  / Bili: x / Urobili: x   Blood: x / Protein: x / Nitrite: x   Leuk Esterase: x / RBC: x / WBC x   Sq Epi: x / Non Sq Epi: x / Bacteria: x            MICROBIOLOGY:     RADIOLOGY:  [x ] Reviewed and interpreted by me    PULMONARY FUNCTION TESTS:    EKG:
[facility] [place] [facility]   MD MARIBELL CARRION MD KRISTINE SOLTANPOUR, DO ANGELA WONG, PA    TEL:  OFFICE: [phone]  From 5pm-7am Answering Service [phone]    -- RENAL FOLLOW UP NOTE ---Date of Service 08-28-23 @ 13:30    Patient is a 68y old  Male who presents with a chief complaint of Nausea and hypoxia (27 Aug 2023 12:42)      Patient seen and examined at bedside. No chest pain/sob    VITALS:  T(F): 98.9 (08-28-23 @ 13:00), Max: 98.9 (08-28-23 @ 13:00)  HR: 85 (08-28-23 @ 13:00)  BP: 159/75 (08-28-23 @ 13:00)  RR: 17 (08-28-23 @ 13:00)  SpO2: 94% (08-28-23 @ 13:00)  Wt(kg): --        PHYSICAL EXAM:  General: NAD  Neck: No JVD  Respiratory: CTAB, no wheezes, rales or rhonchi  Cardiovascular: S1, S2, RRR  Gastrointestinal: BS+, soft, NT/ND  Extremities: right bka, left aka    Hospital Medications:   MEDICATIONS  (STANDING):  aspirin enteric coated 81 milliGRAM(s) Oral daily  atorvastatin 80 milliGRAM(s) Oral at bedtime  calcium acetate 667 milliGRAM(s) Oral three times a day with meals  chlorhexidine 2% Cloths 1 Application(s) Topical daily  dextrose 50% Injectable 25 Gram(s) IV Push once  dextrose 50% Injectable 12.5 Gram(s) IV Push once  dextrose 50% Injectable 25 Gram(s) IV Push once  dextrose Oral Gel 15 Gram(s) Oral once  glucagon  Injectable 1 milliGRAM(s) IntraMuscular once  heparin   Injectable 5000 Unit(s) SubCutaneous every 12 hours  lactulose Syrup 20 Gram(s) Oral at bedtime  latanoprost 0.005% Ophthalmic Solution 1 Drop(s) Both EYES at bedtime  pantoprazole    Tablet 40 milliGRAM(s) Oral before breakfast  piperacillin/tazobactam IVPB.. 3.375 Gram(s) IV Intermittent every 12 hours  prednisoLONE acetate 1% Suspension 1 Drop(s) Both EYES two times a day  senna 2 Tablet(s) Oral at bedtime      LABS:  08-28    135  |  91<L>  |  58<H>  ----------------------------<  64<L>  5.2   |  29  |  5.55<H>    Ca    9.4      28 Aug 2023 05:50  Phos  4.2     08-28  Mg     3.10     08-28      Creatinine Trend: 5.55 <--, 4.09 <--, 3.00 <--, 2.16 <--    Phosphorus: 4.2 mg/dL (08-28 @ 05:50)                              12.4   5.21  )-----------( 174      ( 28 Aug 2023 05:50 )             38.9     Urine Studies:  Urinalysis - [08-28-23 @ 05:50]      Color  / Appearance  / SG  / pH       Gluc 64 / Ketone   / Bili  / Urobili        Blood  / Protein  / Leuk Est  / Nitrite       RBC  / WBC  / Hyaline  / Gran  / Sq Epi  / Non Sq Epi  / Bacteria       HbA1c 4.2      [07-19-19 @ 09:56]  TSH 1.790      [06-23-23 @ 11:51]        RADIOLOGY & ADDITIONAL STUDIES:  
[facility] Division of Hospital Medicine  Manjinder Avery MD  Pager [phone]      Patient is a 68y old  Male who presents with a chief complaint of Nausea and hypoxia (28 Aug 2023 13:30)      SUBJECTIVE / OVERNIGHT EVENTS:    low FS readings o/n. pt denies any hypoglycemic symptoms. No new complaints    ADDITIONAL REVIEW OF SYSTEMS:    RESPIRATORY: No cough, wheezing, chills or hemoptysis; No shortness of breath  CARDIOVASCULAR: No chest pain, palpitations, dizziness, or leg swelling  GASTROINTESTINAL: No abdominal or epigastric pain. No nausea, vomiting, or hematemesis; No diarrhea or constipation. No melena or hematochezia.      MEDICATIONS  (STANDING):  aspirin enteric coated 81 milliGRAM(s) Oral daily  atorvastatin 80 milliGRAM(s) Oral at bedtime  calcium acetate 667 milliGRAM(s) Oral three times a day with meals  chlorhexidine 2% Cloths 1 Application(s) Topical daily  dextrose 50% Injectable 25 Gram(s) IV Push once  dextrose 50% Injectable 12.5 Gram(s) IV Push once  dextrose 50% Injectable 25 Gram(s) IV Push once  dextrose Oral Gel 15 Gram(s) Oral once  glucagon  Injectable 1 milliGRAM(s) IntraMuscular once  heparin   Injectable 5000 Unit(s) SubCutaneous every 12 hours  lactulose Syrup 20 Gram(s) Oral at bedtime  latanoprost 0.005% Ophthalmic Solution 1 Drop(s) Both EYES at bedtime  pantoprazole    Tablet 40 milliGRAM(s) Oral before breakfast  piperacillin/tazobactam IVPB.. 3.375 Gram(s) IV Intermittent every 12 hours  prednisoLONE acetate 1% Suspension 1 Drop(s) Both EYES two times a day  senna 2 Tablet(s) Oral at bedtime    MEDICATIONS  (PRN):  acetaminophen     Tablet .. 650 milliGRAM(s) Oral every 6 hours PRN Temp greater or equal to 38C (100.4F), Mild Pain (1 - 3)  aluminum hydroxide/magnesium hydroxide/simethicone Suspension 30 milliLiter(s) Oral every 4 hours PRN Dyspepsia  melatonin 3 milliGRAM(s) Oral at bedtime PRN Insomnia  ondansetron Injectable 4 milliGRAM(s) IV Push every 8 hours PRN Nausea and/or Vomiting  sodium chloride 0.9% Bolus. 100 milliLiter(s) IV Bolus every 5 minutes PRN SBP LESS THAN or EQUAL to 100 mmHg      CAPILLARY BLOOD GLUCOSE      POCT Blood Glucose.: 108 mg/dL (28 Aug 2023 09:12)  POCT Blood Glucose.: 71 mg/dL (28 Aug 2023 07:43)  POCT Blood Glucose.: 70 mg/dL (28 Aug 2023 07:42)  POCT Blood Glucose.: 117 mg/dL (28 Aug 2023 03:27)  POCT Blood Glucose.: 135 mg/dL (28 Aug 2023 02:59)  POCT Blood Glucose.: 70 mg/dL (28 Aug 2023 02:25)  POCT Blood Glucose.: 65 mg/dL (28 Aug 2023 02:23)  POCT Blood Glucose.: 108 mg/dL (27 Aug 2023 21:24)  POCT Blood Glucose.: 78 mg/dL (27 Aug 2023 16:28)    I&O's Summary      PHYSICAL EXAM:  Vital Signs Last 24 Hrs  T(C): 37.2 (28 Aug 2023 13:00), Max: 37.2 (28 Aug 2023 13:00)  T(F): 98.9 (28 Aug 2023 13:00), Max: 98.9 (28 Aug 2023 13:00)  HR: 85 (28 Aug 2023 13:00) (81 - 87)  BP: 159/75 (28 Aug 2023 13:00) (136/73 - 159/75)  BP(mean): --  RR: 17 (28 Aug 2023 13:00) (16 - 18)  SpO2: 94% (28 Aug 2023 13:00) (94% - 100%)    Parameters below as of 28 Aug 2023 13:00  Patient On (Oxygen Delivery Method): room air        CONSTITUTIONAL: NAD,  EYES: PERRLA; conjunctiva and sclera clear  ENMT: Moist oral mucosa, no pharyngeal injection or exudates;   NECK: Supple, no palpable masses;  RESPIRATORY: Normal respiratory effort; basilar crackles  bilaterally  CARDIOVASCULAR: Regular rate and rhythm, normal S1 and S2, no murmur/rub/gallop; No lower extremity edema; Peripheral pulses are 2+ bilaterally  ABDOMEN: Nontender to palpation, normoactive bowel sounds, no rebound/guarding;   MUSCLOSKELETAL:   no clubbing or cyanosis of digits; no joint swelling or tenderness to palpation  PSYCH: A+O to person, place, and time; affect appropriate  NEUROLOGY: CN 2-12 are intact and symmetric; no gross sensory deficits;   SKIN: RUE skin tear    LABS:                        12.4   5.21  )-----------( 174      ( 28 Aug 2023 05:50 )             38.9     08-28    135  |  91<L>  |  58<H>  ----------------------------<  64<L>  5.2   |  29  |  5.55<H>    Ca    9.4      28 Aug 2023 05:50  Phos  4.2     08-28  Mg     3.10     08-28            Urinalysis Basic - ( 28 Aug 2023 05:50 )    Color: x / Appearance: x / SG: x / pH: x  Gluc: 64 mg/dL / Ketone: x  / Bili: x / Urobili: x   Blood: x / Protein: x / Nitrite: x   Leuk Esterase: x / RBC: x / WBC x   Sq Epi: x / Non Sq Epi: x / Bacteria: x        Culture - Urine (collected 25 Aug 2023 23:30)  Source: Clean Catch Clean Catch (Midstream)  Final Report (26 Aug 2023 21:31):    No growth        RADIOLOGY & ADDITIONAL TESTS:  Results Reviewed:   Imaging Personally Reviewed:  Electrocardiogram Personally Reviewed:    COORDINATION OF CARE:  Care Discussed with Consultants/Other Providers [Y/N]:  Prior or Outpatient Records Reviewed [Y/N]:  
JAMES PATRICK  68y  Patient is a 68y old  Male who presents with a chief complaint of Nausea and hypoxia (27 Aug 2023 11:57)    HPI:  [facility].  He has bilateral Pleural effusions.  Seen by Pulmonary.    HEALTH ISSUES - PROBLEM Dx:  Acute respiratory failure with hypoxia    ESRD on dialysis    Elevated troponin    HTN (hypertension)    History of quadriplegia    Acute UTI    Pleural effusion    Potential for hypoglycemia          MEDICATIONS  (STANDING):  aspirin enteric coated 81 milliGRAM(s) Oral daily  atorvastatin 80 milliGRAM(s) Oral at bedtime  calcium acetate 667 milliGRAM(s) Oral three times a day with meals  dextrose 50% Injectable 25 Gram(s) IV Push once  dextrose 50% Injectable 12.5 Gram(s) IV Push once  dextrose 50% Injectable 25 Gram(s) IV Push once  dextrose Oral Gel 15 Gram(s) Oral once  glucagon  Injectable 1 milliGRAM(s) IntraMuscular once  heparin   Injectable 5000 Unit(s) SubCutaneous every 12 hours  lactulose Syrup 20 Gram(s) Oral at bedtime  latanoprost 0.005% Ophthalmic Solution 1 Drop(s) Both EYES at bedtime  pantoprazole    Tablet 40 milliGRAM(s) Oral before breakfast  piperacillin/tazobactam IVPB.. 3.375 Gram(s) IV Intermittent every 12 hours  prednisoLONE acetate 1% Suspension 1 Drop(s) Both EYES two times a day  senna 2 Tablet(s) Oral at bedtime    MEDICATIONS  (PRN):  acetaminophen     Tablet .. 650 milliGRAM(s) Oral every 6 hours PRN Temp greater or equal to 38C (100.4F), Mild Pain (1 - 3)  aluminum hydroxide/magnesium hydroxide/simethicone Suspension 30 milliLiter(s) Oral every 4 hours PRN Dyspepsia  melatonin 3 milliGRAM(s) Oral at bedtime PRN Insomnia  ondansetron Injectable 4 milliGRAM(s) IV Push every 8 hours PRN Nausea and/or Vomiting    Vital Signs Last 24 Hrs  T(C): 36.8 (27 Aug 2023 09:00), Max: 36.9 (26 Aug 2023 22:00)  T(F): 98.3 (27 Aug 2023 09:00), Max: 98.4 (26 Aug 2023 22:00)  HR: 84 (27 Aug 2023 09:00) (81 - 85)  BP: 154/89 (27 Aug 2023 09:00) (147/77 - 154/89)  BP(mean): --  RR: 19 (27 Aug 2023 09:00) (16 - 19)  SpO2: 100% (27 Aug 2023 09:00) (87% - 100%)    Parameters below as of 27 Aug 2023 09:00  Patient On (Oxygen Delivery Method): nasal cannula      Daily     Daily     PHYSICAL EXAM:  Constitutional:  He appears comfortable and not distressed. Not diaphoretic.    Neck:  The thyroid is normal. Trachea is midline.     Breasts: Normal examination.    Respiratory: The lungs are clear to auscultation. No dullness and expansion is normal.    Cardiovascular: S1 and S2 are normal. No mummurs, rubs or gallops are present.    Gastrointestinal: The abdomen is soft. No tenderness is present.     Genitourinary: The bladder is not distended. No CVA tenderness is present.    Extremities: No edema is noted. No deformities are present.    Neurological: AAO x1.Tone, power and sensation are normal.     Skin: No lesions are seen  or palpated.    Lymph Nodes: No lymphadenopathy is present.                              12.6   6.20  )-----------( 126      ( 26 Aug 2023 09:55 )             41.1     08-27    132<L>  |  90<L>  |  47<H>  ----------------------------<  53<LL>  5.2   |  30  |  4.09<H>    Ca    9.6      27 Aug 2023 04:38  Phos  3.5     08-27  Mg     2.90     08-27    TPro  8.4<H>  /  Alb  3.7  /  TBili  0.4  /  DBili  x   /  AST  26  /  ALT  12  /  AlkPhos  118  08-25  Sq Epi: x / Non Sq Epi: x / Bacteria: x    
Patient is a 68y old  Male who presents with a chief complaint of Nausea and hypoxia (26 Aug 2023 17:43)    SUBJECTIVE / OVERNIGHT EVENTS:    No events overnight. This AM, patient without n/v/d/cp. SOB somewhat better.    MEDICATIONS  (STANDING):  aspirin enteric coated 81 milliGRAM(s) Oral daily  atorvastatin 80 milliGRAM(s) Oral at bedtime  calcium acetate 667 milliGRAM(s) Oral three times a day with meals  dextrose 50% Injectable 25 Gram(s) IV Push once  dextrose 50% Injectable 12.5 Gram(s) IV Push once  dextrose 50% Injectable 25 Gram(s) IV Push once  dextrose Oral Gel 15 Gram(s) Oral once  glucagon  Injectable 1 milliGRAM(s) IntraMuscular once  heparin   Injectable 5000 Unit(s) SubCutaneous every 12 hours  lactulose Syrup 20 Gram(s) Oral at bedtime  latanoprost 0.005% Ophthalmic Solution 1 Drop(s) Both EYES at bedtime  pantoprazole    Tablet 40 milliGRAM(s) Oral before breakfast  piperacillin/tazobactam IVPB.. 3.375 Gram(s) IV Intermittent every 12 hours  prednisoLONE acetate 1% Suspension 1 Drop(s) Both EYES two times a day  senna 2 Tablet(s) Oral at bedtime    MEDICATIONS  (PRN):  acetaminophen     Tablet .. 650 milliGRAM(s) Oral every 6 hours PRN Temp greater or equal to 38C (100.4F), Mild Pain (1 - 3)  aluminum hydroxide/magnesium hydroxide/simethicone Suspension 30 milliLiter(s) Oral every 4 hours PRN Dyspepsia  melatonin 3 milliGRAM(s) Oral at bedtime PRN Insomnia  ondansetron Injectable 4 milliGRAM(s) IV Push every 8 hours PRN Nausea and/or Vomiting      PHYSICAL EXAM:  T(C): 36.8 (08-27-23 @ 09:00), Max: 36.9 (08-26-23 @ 22:00)  HR: 84 (08-27-23 @ 09:00) (81 - 85)  BP: 154/89 (08-27-23 @ 09:00) (147/77 - 154/89)  RR: 19 (08-27-23 @ 09:00) (16 - 19)  SpO2: 100% (08-27-23 @ 09:00) (87% - 100%)  I&O's Summary    GENERAL: NAD, lying in bed  HEAD:  Atraumatic, Normocephalic, MMM  CHEST/LUNG: No use of accessory muscles, CTAB, breathing non-labored  COR: RR, no mrcg  ABD: Soft, ND/NT, +BS  PSYCH: AAOx3  NEUROLOGY: CN II-XII grossly intact, moving all extremities  SKIN: No rashes or lesions  EXT: wwp, no cce    LABS:  CAPILLARY BLOOD GLUCOSE      POCT Blood Glucose.: 69 mg/dL (27 Aug 2023 11:24)  POCT Blood Glucose.: 67 mg/dL (27 Aug 2023 11:19)  POCT Blood Glucose.: 135 mg/dL (27 Aug 2023 07:43)  POCT Blood Glucose.: 63 mg/dL (27 Aug 2023 07:10)  POCT Blood Glucose.: 78 mg/dL (26 Aug 2023 21:04)  POCT Blood Glucose.: 77 mg/dL (26 Aug 2023 15:57)                          12.6   6.20  )-----------( 126      ( 26 Aug 2023 09:55 )             41.1     08-27    132<L>  |  90<L>  |  47<H>  ----------------------------<  53<LL>  5.2   |  30  |  4.09<H>    Ca    9.6      27 Aug 2023 04:38  Phos  3.5     08-27  Mg     2.90     08-27    TPro  8.4<H>  /  Alb  3.7  /  TBili  0.4  /  DBili  x   /  AST  26  /  ALT  12  /  AlkPhos  118  08-25    PT/INR - ( 26 Aug 2023 09:55 )   PT: 11.3 sec;   INR: 1.00 ratio         PTT - ( 26 Aug 2023 09:55 )  PTT:36.2 sec  CARDIAC MARKERS ( 26 Aug 2023 09:55 )  x     / x     / 98 U/L / x     / 3.0 ng/mL      Urinalysis Basic - ( 27 Aug 2023 04:38 )    Color: x / Appearance: x / SG: x / pH: x  Gluc: 53 mg/dL / Ketone: x  / Bili: x / Urobili: x   Blood: x / Protein: x / Nitrite: x   Leuk Esterase: x / RBC: x / WBC x   Sq Epi: x / Non Sq Epi: x / Bacteria: x        Culture - Urine (collected 25 Aug 2023 23:30)  Source: Clean Catch Clean Catch (Midstream)  Final Report (26 Aug 2023 21:31):    No growth        RADIOLOGY & ADDITIONAL TESTS:    Telemetry Personally Reviewed -     Imaging Personally Reviewed -     Imaging Reviewed -     Consultant(s) Notes Reviewed -       Care Discussed with Consultants/Other Providers -

## 2023-08-29 NOTE — DISCHARGE NOTE NURSING/CASE MANAGEMENT/SOCIAL WORK - NSDCVIVACCINE_GEN_ALL_CORE_FT
Tdap; 15-Aug-2017 22:58; Shalom Stanley); [employer]; [id number]; IntraMuscular; Deltoid Left.; 0.5 milliLiter(s); VIS (VIS Published: 09-May-2013, VIS Presented: 15-Aug-2017);

## 2023-08-29 NOTE — DISCHARGE NOTE NURSING/CASE MANAGEMENT/SOCIAL WORK - NSDCPEFALRISK_GEN_ALL_CORE
For information on Fall & Injury Prevention, visit: [email or web address] OR  [email or web address] OR  https://www.cdc.gov/steadi/patient.html

## 2023-08-29 NOTE — PROGRESS NOTE ADULT - NS ATTEST RISK PROBLEM GEN_ALL_CORE FT
I changed to trulicity , if it is too expensive , he will have to go without it since he is already on insulin he is covered  
NGA FRANZ IS ASKING TO HAVE THE BYDUEREON CHANGED TO SOMETHING ELSE..IT WILL COST HIM OVER $200 A MONTH FOR THIS  HE USES [facility]  CALLBACK# [phone]  
NGA FRANZ IS ASKING TO HAVE THE BYDUEREON CHANGED TO SOMETHING ELSE..IT WILL COST HIM OVER $200 A MONTH FOR THIS  HE USES [facility]  CALLBACK# [phone]    Thanks, Pretty  
medical complexities

## 2023-08-29 NOTE — DISCHARGE NOTE PROVIDER - PROVIDER TOKENS
FREE:[LAST:[Your],FIRST:[Nephrologist],PHONE:[(   )    -],FAX:[(   )    -],FOLLOWUP:[1-3 days]],FREE:[LAST:[Your],FIRST:[Primary Doctor],PHONE:[(   )    -],FAX:[(   )    -],FOLLOWUP:[1 week]]

## 2023-08-29 NOTE — PROGRESS NOTE ADULT - REASON FOR ADMISSION
Nausea and hypoxia

## 2023-08-29 NOTE — DISCHARGE NOTE NURSING/CASE MANAGEMENT/SOCIAL WORK - PATIENT PORTAL LINK FT
You can access the FollowMyHealth Patient Portal offered by [facility] by registering at the following website: [email or web address]. By joining [employer]’s FollowMyHealth portal, you will also be able to view your health information using other applications (apps) compatible with our system.

## 2023-08-29 NOTE — ADVANCED PRACTICE NURSE CONSULT - ASSESSMENT
General: A&Ox1, able to turn with 2x assistance, bedbound, incontinent of urine and stool. Patient has left upper arm AVF, palpable thrill. Skin warm, dry with increased moisture in intertriginous folds, scattered areas of hyperpigmentation and hypopigmentation, scattered areas of ecchymosis without hematoma on bilateral upper extremities.     Vascular of b/l lower extremities: Right lower extremity with BKA with well approximated surgical scar. Left lower extremity with AKA with well approximated surgical scar. Dry, flaky skin. No temperature changes noted. No Edema.    Sacrum to b/l buttocks: Moisture and incontinence associated dermatitis as evidenced by hypopigmentation, hyperpigmentation, and blanchable erythema. No open ulcerations. No suspected candidiasis. Impaired skin with irregular borders. Does not overly lauren prominences.     Left scapula: Unknown etiology wound, possible scratches vs frictional wound? Measured in cluster as 6.5cmx6.5cmx0.1cm. Presenting as cluster of linear denudations exposing pink moist dermis. Scant serosanguinous drainage, no odor. Periwound hyperpigmentation. No increased warmth, no erythema, no induration, no edema, no overt signs of infection noted at this time. Goals of care: monitor for tissue type changes and continue measures to decrease friction/shear/pressure.

## 2023-08-29 NOTE — DISCHARGE NOTE PROVIDER - HOSPITAL COURSE
7 yo male with pmhx of ESRD on HD (LUE AVF, MWF), functional quadriplegia (R sided BKA + L sided AKA,), blindness, CAD, HTN, HLD, on 2L home O2 who presents to the ED for complaints of nausea and noted to be in acute hypoxic resp failure  2/2 left large pleural effusion with possible superimposed PNA.       Problem/Plan - 1:  ·  Problem: Acute respiratory failure with hypoxia.   ·  Plan: likely 2/2 pleural effusion d/t fluid overload in the setting of ESRD and diastolc CHF. possible superimposed PNA   - weaned off O2 this am, currently satting mid 90s on RA  - Cont to monitor pulse ox  - appreciate pulm recs, no indication for thoracentesis at this time  - c/w vanco/zosyn. will change abx to levaquin on dc to complete total 7 day course.  -fluid overload improved after HD. f/u renal for HD on dc      Problem/Plan - 2:  ·  Problem: Potential for hypoglycemia.   ·  Plan: Hypoglycemic on FS glucose but without symptoms  Not on diabetes medications  encourage oral intake   Will continue to trend FS glucose and provide diet       Problem/Plan - 3:  ·  Problem: Acute UTI.   ·  Plan: ua with bacteria/pyuria and LE. UA neg  - cont with abx as above  - monitor WBC, fever curve.     Problem/Plan - 4:  ·  Problem: ESRD on dialysis.   ·  Plan: ESRD on HD MWF.   - nephrology following   - cont HD per schedule per Renal  - cont home meds        Problem/Plan - 5:  ·  Problem: Elevated troponin.   ·  Plan: likely in s.o NSTEMI II. denies chest pain, ekg wo ischemic changes. CKMB wnl.   -prior Echo in june showed 40-45%LVEF, mild systolic dysfunction  -cont to monitor.     Problem/Plan - 6:  ·  Problem: HTN (hypertension).   ·  Plan: resume nifedipine 60mg qd. hold  labetalol 200mg tid for now . f/u PCP for further titration      Problem/Plan - 7:  ·  Problem: History of quadriplegia.   ·  Plan: - frequent repositioning.

## 2023-08-29 NOTE — DISCHARGE NOTE PROVIDER - NSDCFUADDINST_GEN_ALL_CORE_FT
Wound Care Topical recommendations:   Sacrum to b/l buttocks: Cleanse with skin cleanser, pat dry. Apply Martine moisture barrier cream twice a day and PRN with incontinent episodes.   Left scapula: Cleanse with NS, pat dry. Apply Liquid barrier film to periwound skin (allow to dry). Cover with silicone foam with border. Change daily and PRN if soiled. Monitor for tissue type changes.   Apply Sween 24 Moisturizer to b/l UE and LE daily.

## 2023-08-29 NOTE — DISCHARGE NOTE PROVIDER - NSFOLLOWUPCLINICS_GEN_ALL_ED_FT
[facility] Pulmonolgy and Sleep Medicine  Pulmonology  [address] [place] [address], [place] [address]  Phone: [phone]  Fax:   Follow Up Time: 2 weeks

## 2023-08-29 NOTE — CHART NOTE - NSCHARTNOTEFT_GEN_A_CORE
pt seen and examined. vitals, labs reviewed. pt is stable for discharge back to LTC facility. total time spent on dc 42min
[facility] NIGHT MEDICINE COVERAGE    Notified by RN that pt's FS was 65 and 70, pt asymptomatic, VSS.  Hypoglycemia protocol initiated, requested 1/2 amp D50 IVP x1 be given.  Pt assessed, he reports feeling "okay".  Denies weakness, sweating, or confusion.  Pt is alert and awake on assessment.  Poor PO intake noted from primary RN.  Pt is not ordered for any insulin.  Hypoglycemia likely 2/2 poor PO intake.  F/u FS per hypoglycemia protocol.  Will endorse to day provider in AM.  Will continue to monitor.    CAPILLARY BLOOD GLUCOSE  POCT Blood Glucose.: 70 mg/dL (28 Aug 2023 02:25)  POCT Blood Glucose.: 65 mg/dL (28 Aug 2023 02:23)  POCT Blood Glucose.: 108 mg/dL (27 Aug 2023 21:24)  POCT Blood Glucose.: 78 mg/dL (27 Aug 2023 16:28)  POCT Blood Glucose.: 197 mg/dL (27 Aug 2023 12:11)  POCT Blood Glucose.: 69 mg/dL (27 Aug 2023 11:24)  POCT Blood Glucose.: 67 mg/dL (27 Aug 2023 11:19)  POCT Blood Glucose.: 135 mg/dL (27 Aug 2023 07:43)  POCT Blood Glucose.: 63 mg/dL (27 Aug 2023 07:10)    Vital Signs Last 24 Hrs  T(C): 36.8 (28 Aug 2023 02:00), Max: 36.8 (27 Aug 2023 09:00)  T(F): 98.3 (28 Aug 2023 02:00), Max: 98.3 (27 Aug 2023 09:00)  HR: 87 (28 Aug 2023 02:00) (81 - 87)  BP: 146/76 (28 Aug 2023 02:00) (136/73 - 154/89)  RR: 18 (28 Aug 2023 02:00) (17 - 19)  SpO2: 98% (28 Aug 2023 02:00) (98% - 100%)  O2 Parameters below as of 28 Aug 2023 02:00  Patient On (Oxygen Delivery Method): nasal cannula  O2 Flow (L/min): 2    Lenny Christianson PA-C  Department of Medicine - [facility]  In-House Pager: #[phone]

## 2023-08-29 NOTE — DISCHARGE NOTE PROVIDER - NSDCCPCAREPLAN_GEN_ALL_CORE_FT
PRINCIPAL DISCHARGE DIAGNOSIS  Diagnosis: Acute respiratory failure with hypoxia  Assessment and Plan of Treatment: due to pneumonia and fluid overload. resolved after HD and abx treatment      SECONDARY DISCHARGE DIAGNOSES  Diagnosis: Acute UTI  Assessment and Plan of Treatment: continue antibiotics as instructed    Diagnosis: ESRD on dialysis  Assessment and Plan of Treatment: continue HD    Diagnosis: Gram-negative pneumonia  Assessment and Plan of Treatment: continue antibiotics as intructed    Diagnosis: HTN (hypertension)  Assessment and Plan of Treatment: continue nifedipine. hold labetalol. f/u PCP for further titration of meds

## 2023-08-29 NOTE — ADVANCED PRACTICE NURSE CONSULT - REASON FOR CONSULT
Patient seen on skin care rounds after wound care referral received for assessment of skin impairment and recommendations of topical management. As per H&P, patient is a 67 yo male with pmhx of ESRD on HD (LUE AVF, MWF), functional quadriplegia (R sided BKA + L sided AKA,), blindness, CAD, HTN, HLD, on 2L home O2 who presents to the ED for complaints of nausea and noted to be in acute hypoxic resp failure  2/2 left large pleural effusion with possible superimposed PNA. Chart reviewed: WBC 4.49, H/H 10.6/33.6, Platelets 163, INR 1.00, Serum albumin 3.7, A1C 5.1, BMI 46.2, Iglesia 11.

## 2023-08-29 NOTE — PROGRESS NOTE ADULT - ASSESSMENT
ESRD:  On HD TIW at [facility].  Last HD 8/28 uf 1.85  hd tmr  UF as tolerated  consent from niece in dialysis unit  - Renal Diet.  - HD on MWF schedule.    Pleural Effusion:  - Pulmonary Follow up.    Urinary Tract Infection:  - Continue antibiotics.    Anemia:  at goal  TAINA if Hg < 11.  monitor    hyponatremia  free water restriction  uf with hd as tolerated  monitor  
69 yo male with pmhx of ESRD on HD (LUE AVF, MWF), functional quadriplegia (R sided BKA + L sided AKA,), blindness, CAD, HTN, HLD, on 2L home O2 admitted for hypoxia although upon admission on RA and comfortable; pulm c/f nocturnal hypoxia.    Imaging and Bedside POCUS with bilateral pleural effusions, L>R, both simple. Predominantly A line pattern. Reported desaturations while asleep only.    - c/w HD per schedule and nephro and can consider more fluid removal; last HD yesterday 8/28 with 1.85L UF   - no thoracentesis as patient not dyspneic, and also recent prior dx thora (transudate, unremarkable micro/cyt)  - please refer for sleep study upon discharge as history strongly suggests TAYLER. Please email [email or web address] on the day before discharge for Pulm f/up.     D/w Dr Flaco Otero MD  PCCM Fellow
ESRD:  On HD TIW at [facility].  Last HD 8/26, hd today  UF as tolerated  consent from niece in dialysis unit  - Renal Diet.  - HD on MWF schedule.    Pleural Effusion:  - Pulmonary Follow up.    Urinary Tract Infection:  - Continue antibiotics.    Anemia:  at goal  TAINA if Hg < 11.  monitor  
69 yo male with pmhx of ESRD on HD (LUE AVF, MWF), functional quadriplegia (R sided BKA + L sided AKA,), blindness, CAD, HTN, HLD, on 2L home O2 who presents to the ED for complaints of nausea and noted to be in acute hypoxic resp failure  2/2 left large pleural effusion with possible superimposed PNA.
69 yo male with pmhx of ESRD on HD (LUE AVF, MWF), functional quadriplegia (R sided BKA + L sided AKA,), blindness, CAD, HTN, HLD, on 2L home O2 who presents to the ED for complaints of nausea and noted to be in acute hypoxic resp failure  2/2 left large pleural effusion with possible superimposed PNA.
ESRD:  [facility]W at  [facility].  Last HD was yesterday. He has an effusion but is not in Pulmonary edema.  His BP is on low side.   - No need for urgent HD.  - Will attempt increased UF at HD. However, this may take a long time to control effusion and he may become hypotensive.  - Renal Diet.  - HD on MWF schedule.    Pleural Effusion:  - Pulmonary Follow up.        Urinary Tract Infection:  - Continue antibiotics.    Anemia:  TAINA if Hg < 11.

## 2023-08-29 NOTE — DISCHARGE NOTE PROVIDER - CARE PROVIDER_API CALL
Your, Nephrologist  Phone: (   )    -  Fax: (   )    -  Follow Up Time: 1-3 days    Your, Primary Doctor  Phone: (   )    -  Fax: (   )    -  Follow Up Time: 1 week

## 2023-08-29 NOTE — ADVANCED PRACTICE NURSE CONSULT - RECOMMEDATIONS
Topical recommendations:     Sacrum to b/l buttocks: Cleanse with skin cleanser, pat dry. Apply Martine moisture barrier cream twice a day and PRN with incontinent episodes.     Left scapula: Cleanse with NS, pat dry. Apply Liquid barrier film to periwound skin (allow to dry). Cover with silicone foam with border. Change daily and PRN if soiled. Monitor for tissue type changes.     Apply Sween 24 Moisturizer to b/l UE and LE daily.    Continue low air loss bed therapy, continue heel elevation, continue to turn & reposition as per protocol, soft pillow between bony prominences, continue moisture management with barrier creams & single breathable pad, continue measures to decrease friction/shear/pressure. Continue with nutritional support as per dietary/orders.     Plan of care discussed with Primary RN Emma and     Please contact Wound/Ostomy Care Service Line if we can be of further assistance (ext [phone]).  Topical recommendations:     Sacrum to b/l buttocks: Cleanse with skin cleanser, pat dry. Apply Martine moisture barrier cream twice a day and PRN with incontinent episodes.     Left scapula: Cleanse with NS, pat dry. Apply Liquid barrier film to periwound skin (allow to dry). Cover with silicone foam with border. Change daily and PRN if soiled. Monitor for tissue type changes.     Apply Sween 24 Moisturizer to b/l UE and LE daily.    Continue low air loss bed therapy, continue heel elevation, continue to turn & reposition as per protocol, soft pillow between bony prominences, continue moisture management with barrier creams & single breathable pad, continue measures to decrease friction/shear/pressure. Continue with nutritional support as per dietary/orders.     Plan of care discussed with Primary RN Emma and Lila Lewis (PA)    Please contact Wound/Ostomy Care Service Line if we can be of further assistance (ext [phone]).

## 2023-08-30 PROCEDURE — 93298 REM INTERROG DEV EVAL SCRMS: CPT

## 2023-08-30 PROCEDURE — G2066: CPT | Mod: NC

## 2023-09-10 ENCOUNTER — NON-APPOINTMENT (OUTPATIENT)
Age: 68
End: 2023-09-10

## 2023-09-12 ENCOUNTER — APPOINTMENT (OUTPATIENT)
Dept: PULMONOLOGY | Facility: CLINIC | Age: 68
End: 2023-09-12
Payer: MEDICAID

## 2023-09-12 VITALS
WEIGHT: 108 LBS | OXYGEN SATURATION: 94 % | HEART RATE: 65 BPM | SYSTOLIC BLOOD PRESSURE: 147 MMHG | RESPIRATION RATE: 15 BRPM | HEIGHT: 66 IN | BODY MASS INDEX: 17.36 KG/M2 | TEMPERATURE: 97.5 F | DIASTOLIC BLOOD PRESSURE: 68 MMHG

## 2023-09-12 DIAGNOSIS — I50.30 UNSPECIFIED DIASTOLIC (CONGESTIVE) HEART FAILURE: ICD-10-CM

## 2023-09-12 DIAGNOSIS — J96.91 RESPIRATORY FAILURE, UNSPECIFIED WITH HYPOXIA: ICD-10-CM

## 2023-09-12 PROCEDURE — 99215 OFFICE O/P EST HI 40 MIN: CPT | Mod: 25

## 2023-09-12 PROCEDURE — 76604 US EXAM CHEST: CPT

## 2023-09-13 PROBLEM — J96.91 RESPIRATORY FAILURE WITH HYPOXIA, UNSPECIFIED CHRONICITY: Status: ACTIVE | Noted: 2023-09-13

## 2023-09-13 LAB — NT-PROBNP SERPL-MCNC: ABNORMAL PG/ML

## 2023-09-19 NOTE — ED ADULT NURSE REASSESSMENT NOTE - NURSING NEURO ORIENTATION
oriented to person, place and time
None

## 2023-10-03 ENCOUNTER — APPOINTMENT (OUTPATIENT)
Dept: ELECTROPHYSIOLOGY | Facility: CLINIC | Age: 68
End: 2023-10-03
Payer: MEDICAID

## 2023-10-03 ENCOUNTER — NON-APPOINTMENT (OUTPATIENT)
Age: 68
End: 2023-10-03

## 2023-10-03 PROCEDURE — 93298 REM INTERROG DEV EVAL SCRMS: CPT

## 2023-10-03 PROCEDURE — G2066: CPT | Mod: NC

## 2023-10-06 ENCOUNTER — INPATIENT (INPATIENT)
Facility: HOSPITAL | Age: 68
LOS: 4 days | Discharge: NURSING, REHAB OR LONG-TERM CARE | End: 2023-10-11
Attending: INTERNAL MEDICINE | Admitting: INTERNAL MEDICINE
Payer: MEDICAID

## 2023-10-06 VITALS
HEIGHT: 60 IN | TEMPERATURE: 98 F | SYSTOLIC BLOOD PRESSURE: 158 MMHG | RESPIRATION RATE: 18 BRPM | DIASTOLIC BLOOD PRESSURE: 71 MMHG | WEIGHT: 113.1 LBS | HEART RATE: 57 BPM | OXYGEN SATURATION: 97 %

## 2023-10-06 DIAGNOSIS — Z89.611 ACQUIRED ABSENCE OF RIGHT LEG ABOVE KNEE: Chronic | ICD-10-CM

## 2023-10-06 DIAGNOSIS — Z98.890 OTHER SPECIFIED POSTPROCEDURAL STATES: Chronic | ICD-10-CM

## 2023-10-06 DIAGNOSIS — Z89.511 ACQUIRED ABSENCE OF RIGHT LEG BELOW KNEE: Chronic | ICD-10-CM

## 2023-10-06 DIAGNOSIS — Z89.612 ACQUIRED ABSENCE OF LEFT LEG ABOVE KNEE: Chronic | ICD-10-CM

## 2023-10-06 LAB
ALBUMIN SERPL ELPH-MCNC: 2.7 G/DL — LOW (ref 3.3–5)
ALP SERPL-CCNC: 112 U/L — SIGNIFICANT CHANGE UP (ref 40–120)
ALT FLD-CCNC: 21 U/L — SIGNIFICANT CHANGE UP (ref 12–78)
ANION GAP SERPL CALC-SCNC: 4 MMOL/L — LOW (ref 5–17)
ANISOCYTOSIS BLD QL: SLIGHT — SIGNIFICANT CHANGE UP
APTT BLD: 29.3 SEC — SIGNIFICANT CHANGE UP (ref 24.5–35.6)
AST SERPL-CCNC: 27 U/L — SIGNIFICANT CHANGE UP (ref 15–37)
BASOPHILS # BLD AUTO: 0 K/UL — SIGNIFICANT CHANGE UP (ref 0–0.2)
BASOPHILS NFR BLD AUTO: 0 % — SIGNIFICANT CHANGE UP (ref 0–2)
BILIRUB SERPL-MCNC: 0.4 MG/DL — SIGNIFICANT CHANGE UP (ref 0.2–1.2)
BUN SERPL-MCNC: 64 MG/DL — HIGH (ref 7–23)
CALCIUM SERPL-MCNC: 8.6 MG/DL — SIGNIFICANT CHANGE UP (ref 8.5–10.1)
CHLORIDE SERPL-SCNC: 94 MMOL/L — LOW (ref 96–108)
CO2 SERPL-SCNC: 35 MMOL/L — HIGH (ref 22–31)
CREAT SERPL-MCNC: 4.56 MG/DL — HIGH (ref 0.5–1.3)
EGFR: 13 ML/MIN/1.73M2 — LOW
EOSINOPHIL # BLD AUTO: 1 K/UL — HIGH (ref 0–0.5)
EOSINOPHIL NFR BLD AUTO: 19 % — HIGH (ref 0–6)
GLUCOSE SERPL-MCNC: 80 MG/DL — SIGNIFICANT CHANGE UP (ref 70–99)
HCT VFR BLD CALC: 39.4 % — SIGNIFICANT CHANGE UP (ref 39–50)
HGB BLD-MCNC: 12.7 G/DL — LOW (ref 13–17)
INR BLD: 0.84 RATIO — LOW (ref 0.85–1.18)
LYMPHOCYTES # BLD AUTO: 0.58 K/UL — LOW (ref 1–3.3)
LYMPHOCYTES # BLD AUTO: 11 % — LOW (ref 13–44)
MACROCYTES BLD QL: SLIGHT — SIGNIFICANT CHANGE UP
MANUAL SMEAR VERIFICATION: SIGNIFICANT CHANGE UP
MCHC RBC-ENTMCNC: 28.7 PG — SIGNIFICANT CHANGE UP (ref 27–34)
MCHC RBC-ENTMCNC: 32.2 G/DL — SIGNIFICANT CHANGE UP (ref 32–36)
MCV RBC AUTO: 89.1 FL — SIGNIFICANT CHANGE UP (ref 80–100)
MONOCYTES # BLD AUTO: 0.32 K/UL — SIGNIFICANT CHANGE UP (ref 0–0.9)
MONOCYTES NFR BLD AUTO: 6 % — SIGNIFICANT CHANGE UP (ref 2–14)
NEUTROPHILS # BLD AUTO: 3.37 K/UL — SIGNIFICANT CHANGE UP (ref 1.8–7.4)
NEUTROPHILS NFR BLD AUTO: 63 % — SIGNIFICANT CHANGE UP (ref 43–77)
NEUTS BAND # BLD: 1 % — SIGNIFICANT CHANGE UP (ref 0–8)
NRBC # BLD: 0 /100 — SIGNIFICANT CHANGE UP (ref 0–0)
NRBC # BLD: SIGNIFICANT CHANGE UP /100 WBCS (ref 0–0)
PLAT MORPH BLD: NORMAL — SIGNIFICANT CHANGE UP
PLATELET # BLD AUTO: 142 K/UL — LOW (ref 150–400)
PLATELET COUNT - ESTIMATE: ABNORMAL
POTASSIUM SERPL-MCNC: 5.6 MMOL/L — HIGH (ref 3.5–5.3)
POTASSIUM SERPL-SCNC: 5.6 MMOL/L — HIGH (ref 3.5–5.3)
PROT SERPL-MCNC: 7.5 GM/DL — SIGNIFICANT CHANGE UP (ref 6–8.3)
PROTHROM AB SERPL-ACNC: 10.1 SEC — SIGNIFICANT CHANGE UP (ref 9.5–13)
RBC # BLD: 4.42 M/UL — SIGNIFICANT CHANGE UP (ref 4.2–5.8)
RBC # FLD: 17.9 % — HIGH (ref 10.3–14.5)
RBC BLD AUTO: NORMAL — SIGNIFICANT CHANGE UP
SODIUM SERPL-SCNC: 133 MMOL/L — LOW (ref 135–145)
TROPONIN I, HIGH SENSITIVITY RESULT: 57.4 NG/L — SIGNIFICANT CHANGE UP
WBC # BLD: 5.27 K/UL — SIGNIFICANT CHANGE UP (ref 3.8–10.5)
WBC # FLD AUTO: 5.27 K/UL — SIGNIFICANT CHANGE UP (ref 3.8–10.5)

## 2023-10-06 PROCEDURE — 99291 CRITICAL CARE FIRST HOUR: CPT

## 2023-10-06 PROCEDURE — 70450 CT HEAD/BRAIN W/O DYE: CPT | Mod: 26,MA,59

## 2023-10-06 PROCEDURE — 70496 CT ANGIOGRAPHY HEAD: CPT | Mod: 26,MA

## 2023-10-06 PROCEDURE — 70498 CT ANGIOGRAPHY NECK: CPT | Mod: 26,MA

## 2023-10-06 PROCEDURE — 99223 1ST HOSP IP/OBS HIGH 75: CPT

## 2023-10-06 PROCEDURE — 93010 ELECTROCARDIOGRAM REPORT: CPT

## 2023-10-06 PROCEDURE — 0042T: CPT | Mod: MA

## 2023-10-06 RX ORDER — ASPIRIN/CALCIUM CARB/MAGNESIUM 324 MG
325 TABLET ORAL ONCE
Refills: 0 | Status: COMPLETED | OUTPATIENT
Start: 2023-10-06 | End: 2023-10-06

## 2023-10-06 RX ORDER — ATORVASTATIN CALCIUM 80 MG/1
80 TABLET, FILM COATED ORAL AT BEDTIME
Refills: 0 | Status: DISCONTINUED | OUTPATIENT
Start: 2023-10-06 | End: 2023-10-11

## 2023-10-06 RX ORDER — ASPIRIN/CALCIUM CARB/MAGNESIUM 324 MG
81 TABLET ORAL DAILY
Refills: 0 | Status: DISCONTINUED | OUTPATIENT
Start: 2023-10-06 | End: 2023-10-11

## 2023-10-06 RX ORDER — HEPARIN SODIUM 5000 [USP'U]/ML
5000 INJECTION INTRAVENOUS; SUBCUTANEOUS EVERY 12 HOURS
Refills: 0 | Status: DISCONTINUED | OUTPATIENT
Start: 2023-10-06 | End: 2023-10-11

## 2023-10-06 RX ORDER — SODIUM ZIRCONIUM CYCLOSILICATE 10 G/10G
5 POWDER, FOR SUSPENSION ORAL ONCE
Refills: 0 | Status: COMPLETED | OUTPATIENT
Start: 2023-10-06 | End: 2023-10-06

## 2023-10-06 RX ORDER — TICAGRELOR 90 MG/1
180 TABLET ORAL ONCE
Refills: 0 | Status: COMPLETED | OUTPATIENT
Start: 2023-10-06 | End: 2023-10-06

## 2023-10-06 RX ADMIN — Medication 325 MILLIGRAM(S): at 19:03

## 2023-10-06 RX ADMIN — SODIUM ZIRCONIUM CYCLOSILICATE 5 GRAM(S): 10 POWDER, FOR SUSPENSION ORAL at 19:02

## 2023-10-06 RX ADMIN — ATORVASTATIN CALCIUM 80 MILLIGRAM(S): 80 TABLET, FILM COATED ORAL at 23:00

## 2023-10-06 RX ADMIN — TICAGRELOR 180 MILLIGRAM(S): 90 TABLET ORAL at 18:58

## 2023-10-06 NOTE — H&P ADULT - HISTORY OF PRESENT ILLNESS
68M pmhx of ESRD on HD (LUE AVF, MWF), functional quadriplegia (R sided BKA + L sided AKA,), blindness, CAD, HTN, HLD, on 2L home O2 presents as a possible stroke from HD today. was 20minutes in when he became hypotensive. they stopped and gave fluids. he normalized. they did more HD and then he became confused. EMS was called and pt was picked up. no one was certain about his baseline.  Pt seen at bedside with no acute complaints, unsure of what happened Aox1, remember he was at dialysis but unable to give any pertinent history.

## 2023-10-06 NOTE — H&P ADULT - ASSESSMENT
68M pmhx of ESRD on HD (LUE AVF, MWF), functional quadriplegia (R sided BKA + L sided AKA,), blindness, CAD, HTN, HLD, on 2L home O2 presents as a possible stroke from HD today. was 20minutes in when he became hypotensive. they stopped and gave fluids. he normalized. they did more HD and then he became confused. EMS was called and pt was picked up. no one was certain about his baseline.  Pt seen at bedside with no acute complaints, unsure of what happened Aox1, remember he was at dialysis but unable to give any pertinent history.      AMS  R/O CVA vs Hypotension at Dialysis  -asa/statin  -MRI/PT/Neuro Eval in AM  permissive htn for now    ESRD ON HD  -MWF, received hd today  -nephro to see    Hyperkalemia  -s/p lokelma in ed  -f/u potassium in AM    Hyponatremia  -around baseline now     Functional Quadriplegia  -supportive care/pt eval

## 2023-10-06 NOTE — H&P ADULT - NSHPPHYSICALEXAM_GEN_ALL_CORE
Vital Signs Last 24 Hrs  T(C): 36.7 (06 Oct 2023 16:55), Max: 36.7 (06 Oct 2023 16:55)  T(F): 98 (06 Oct 2023 16:55), Max: 98 (06 Oct 2023 16:55)  HR: 57 (06 Oct 2023 16:55) (57 - 57)  BP: 158/71 (06 Oct 2023 16:55) (158/71 - 158/71)  BP(mean): --  RR: 18 (06 Oct 2023 16:55) (18 - 18)  SpO2: 97% (06 Oct 2023 16:55) (97% - 97%)    Parameters below as of 06 Oct 2023 16:55  Patient On (Oxygen Delivery Method): room air    GENERAL: NAD,    HEAD:  Atraumatic, Normocephalic  EYES: EOMI, PERRLA, conjunctiva and sclera clear  ENMT: No tonsillar erythema, exudates, or enlargement; Moist mucous membranes, Good dentition, No lesions  NECK: Supple, No JVD, Normal thyroid  NERVOUS SYSTEM:  Alert & Oriented X1, Good concentration; Motor Strength 5/5 B/L upper  extremities; DTRs 2+ intact and symmetric R sided BKA + L sided AKA  CHEST/LUNG: Clear to percussion bilaterally; No rales, rhonchi, wheezing, or rubs  HEART: Regular rate and rhythm; No murmurs, rubs, or gallops  ABDOMEN: Soft, Nontender, Nondistended; Bowel sounds present  EXTREMITIES:  R sided BKA + L sided AKA  LYMPH: No lymphadenopathy   SKIN: No rashes or lesions

## 2023-10-06 NOTE — ED PROVIDER NOTE - OBJECTIVE STATEMENT
68M pmhx of ESRD on HD (LUE AVF, MWF), functional quadriplegia (R sided BKA + L sided AKA,), blindness, CAD, HTN, HLD, on 2L home O2 presents as a possible stroke from HD today. was 20minutes in when he became hypotensive. they stopped and gave fluids. he normalized. they did more HD and then he became confused. EMS was called and pt was picked up. no one was certain about his baseline.

## 2023-10-06 NOTE — ED ADULT TRIAGE NOTE - AS PAIN REST
RN spoke with Melany [facility] RN.     She states Lasix was discontinued for patient at most recent hosptial discharge, due to concern for his kidney function. He previously took Lasix 40mg, daily.     Today, upon RN assessment, patient has a weight gain of 7 pounds in one week, in addition to weeping legs bilaterally with open sores. Denies fever, warmth, or severe redness to legs. Denies SOB symptoms.     She is wondering if they can get a verbal order from PCP to restart Lasix 40mg, once daily, until patient can come to clinic for further evaluation? Patient has prior Rx at home, does not need a new prescription if agreeable.    Also, would PCP like to see patient before FDC, or refer to another provider?     Routed to PCP to review and advise.     Mari Kaur, RN, BSN, PHN  [facility]: [place]     0 (no pain/absence of nonverbal indicators of pain)

## 2023-10-06 NOTE — CONSULT NOTE ADULT - ASSESSMENT
68M pmhx of ESRD on HD (LUE AVF, MWF), functional quadriplegia (R sided BKA + L sided AKA,), blindness, CAD, HTN, HLD, on 2L home O2 presents as a possible stroke from HD today. was 20minutes in when he became hypotensive, diaphoretic. they stopped and gave fluids. he normalized. they did more HD and then he became confused. Nephrology consulted for hemodialysis        ESRD:  On HD MWF at  [facility].  Partial HD, 30 minutes on 10/6/23  Dialyze tomorrow  consent in chart  - Renal Diet  Monitor BMP  - Avoid nephrotoxic medications      HTN  BP elevated at this time  Monitor      Hyperkalemia  Received lokelma  Give lokelma as needed  Monitor BMP      Anemia:  at goal  TAINA if Hg < 10.  monitor    Hyponatremia  in CKD patient  free water restriction  uf with hd as tolerated  monitor   68M pmhx of ESRD on HD (LUE AVF, MWF), functional quadriplegia (R sided BKA + L sided AKA,), blindness, CAD, HTN, HLD, on 2L home O2 presents as a possible stroke from HD today. was 20minutes in when he became hypotensive, diaphoretic. they stopped and gave fluids. he normalized. they did more HD and then he became confused. Nephrology consulted for hemodialysis    A/P    ESRD:  On HD MWF at  [facility].  Partial HD, 30 minutes on 10/6/23  Dialyze tomorrow  consent in chart  - Renal Diet  Monitor BMP  - Avoid nephrotoxic medications      HTN  BP elevated at this time  Resume home meds  Monitor      Hyperkalemia  Received lokelma  Give more lokelma as needed  Monitor BMP  low K diet      Anemia:  at goal  TAINA if Hg < 10.  monitor    Hyponatremia  in CKD patient  free water restriction  uf with hd as tolerated  monitor

## 2023-10-06 NOTE — ED ADULT NURSE NOTE - NSFALLHARMRISKINTERV_ED_ALL_ED

## 2023-10-06 NOTE — ED PROVIDER NOTE - PHYSICAL EXAMINATION
Gen: Alert, NAD  Head: NC, AT   Eyes: eomi, disconjugate eyes. pt light perceptive only  ENT: normal hearing, patent oropharynx without erythema/exudate, uvula midline  Neck: supple, no tenderness, Trachea midline  Pulm: Bilateral BS, normal resp effort, no wheeze/stridor/retractions  CV: RRR, no M/R/G, 2+ radial and dp pulses bl, no edema  Abd: soft, NT/ND, +BS, no hepatosplenomegaly  Mskel: left aka, right bka  Skin: no rash, no bruising   Neuro: AAOx1, no sensory/motor deficits, CN 2-12 intact

## 2023-10-06 NOTE — CONSULT NOTE ADULT - SUBJECTIVE AND OBJECTIVE BOX
[facility]ACT[facility]   MD MARIBELL CARRION MD RUORU WONG, PA    TEL:  OFFICE: [phone]      FROM 5 PM- 7 AM PLEASE CALL ANSWERING SERVICE AT [phone]    -- INITIAL RENAL CONSULT NOTE --- Date Of service 10-06-23 @ 20:01  --------------------------------------------------------------------------------  HPI:  HPI:   68M pmhx of ESRD on HD ([facility]), functional quadriplegia (R sided BKA + L sided AKA,), blindness, CAD, HTN, HLD, on 2L home O2 presents as a possible stroke from HD today. was 20minutes in when he became hypotensive. they stopped and gave fluids. he normalized. they did more HD and then he became confused. EMS was called        PAST HISTORY  --------------------------------------------------------------------------------  PAST MEDICAL & SURGICAL HISTORY:  DM (diabetes mellitus)      HTN (hypertension)      Below knee amputation status, right      CKD (chronic kidney disease)      MRSA (methicillin resistant Staphylococcus aureus) colonization  (hx/o MRSA growth from wound culture)      Wound  (chronic wounds to left foot and leg)      DM (diabetes mellitus)      HTN (hypertension)      Kidney disease      S/P BKA (below knee amputation) unilateral, right      H/O peripheral artery bypass  left fem to below-knee pop with RSVG      Amputated left leg  above knee      Amputated right leg  below knee        FAMILY HISTORY:  Family history of diabetes mellitus      PAST SOCIAL HISTORY:    ALLERGIES & MEDICATIONS  --------------------------------------------------------------------------------  Allergies    No Known Allergies    Intolerances      Standing Inpatient Medications  aspirin enteric coated 81 milliGRAM(s) Oral daily  atorvastatin 80 milliGRAM(s) Oral at bedtime  heparin   Injectable 5000 Unit(s) SubCutaneous every 12 hours    PRN Inpatient Medications      REVIEW OF SYSTEMS  --------------------------------------------------------------------------------  Gen: No fevers/chills  Skin: No rashes  Head/Eyes/Ears: Normal hearing, legally blind  Respiratory: No dyspnea, cough  CV: No chest pain  GI: No abdominal pain, diarrhea, constipation, nausea, vomiting  MSK: No  edema. NAS MATIAS  Heme: No easy bruising or bleeding  Psych: No significant depression    All other systems were reviewed and are negative, except as noted.    VITALS/PHYSICAL EXAM  --------------------------------------------------------------------------------  T(C): 36.7 (10-06-23 @ 16:55), Max: 36.7 (10-06-23 @ 16:55)  HR: 57 (10-06-23 @ 16:55) (57 - 57)  BP: 158/71 (10-06-23 @ 16:55) (158/71 - 158/71)  RR: 18 (10-06-23 @ 16:55) (18 - 18)  SpO2: 97% (10-06-23 @ 16:55) (97% - 97%)  Wt(kg): --  Height (cm): 104.1 (10-06-23 @ 16:55)  Weight (kg): 51.3 (10-06-23 @ 16:55)  BMI (kg/m2): 47.3 (10-06-23 @ 16:55)  BSA (m2): 1.11 (10-06-23 @ 16:55)      Physical Exam:  	Gen: NAD  	HEENT: MMM  	Pulm: CTA B/L  	CV: S1S2  	Abd: Soft, +BS   	Ext: No edema, LAKA, RAKA  	Neuro: Awake, alert  	Skin: Warm and dry  	Vascular access:  LUAVF            LABS/STUDIES  --------------------------------------------------------------------------------              12.7   5.27  >-----------<  142      [10-06-23 @ 17:30]              39.4     133  |  94  |  64  ----------------------------<  80      [10-06-23 @ 17:30]  5.6   |  35  |  4.56        Ca     8.6     [10-06-23 @ 17:30]    TPro  7.5  /  Alb  2.7  /  TBili  0.4  /  DBili  x   /  AST  27  /  ALT  21  /  AlkPhos  112  [10-06-23 @ 17:30]    PT/INR: PT 10.1 , INR 0.84       [10-06-23 @ 17:30]  PTT: 29.3       [10-06-23 @ 17:30]      Creatinine Trend:  SCr 4.56 [10-06 @ 17:30]    Urinalysis - [10-06-23 @ 17:30]      Color  / Appearance  / SG  / pH       Gluc 80 / Ketone   / Bili  / Urobili        Blood  / Protein  / Leuk Est  / Nitrite       RBC  / WBC  / Hyaline  / Gran  / Sq Epi  / Non Sq Epi  / Bacteria       HbA1c 4.2      [07-19-19 @ 09:56]  TSH 1.790      [06-23-23 @ 11:51]    HBsAb <3.0      [08-28-23 @ 19:56]  HBsAg Nonreact      [08-28-23 @ 19:56]  HBcAb Nonreact      [02-23-21 @ 10:09]  HCV 0.10, Nonreact      [08-28-23 @ 19:56]

## 2023-10-06 NOTE — ED ADULT TRIAGE NOTE - CHIEF COMPLAINT QUOTE
pt became hypotensive at HD and was given 600 ML of NS, after AMS and still has not resolved BGL In the field 91 CODE STROKE CALLED

## 2023-10-06 NOTE — ED PROVIDER NOTE - CLINICAL SUMMARY MEDICAL DECISION MAKING FREE TEXT BOX
AMS - concern for cva vs hypotension. I am unable to identify pt's baseline. perfusion shows acute infarct but there is so much infarct in brain and he is not testing acutely based on my review of previous hospital notes. will not give tnk. will admit, however give dapt.   I read ekg as nsr rate 73, LVH with repolarization abnl, no st elevation or depression, qtc 462. AMS - concern for cva vs hypotension. I am unable to identify pt's baseline. perfusion shows acute infarct but there is so much infarct in brain and he is not testing acutely based on my review of previous hospital notes. will not give tnk. will admit, however give dapt.   I read ekg as nsr rate 73, LVH with repolarization abnl, no st elevation or depression, qtc 462.  case audie garcia neuro. case audie pt's niece (whose contact and status as emergency contact we didn't have or know until 6:35pm) 4.19

## 2023-10-06 NOTE — H&P ADULT - NSHPLABSRESULTS_GEN_ALL_CORE
12.7   5.27  )-----------( 142      ( 06 Oct 2023 17:30 )             39.4     10-06    133<L>  |  94<L>  |  64<H>  ----------------------------<  80  5.6<H>   |  35<H>  |  4.56<H>    Ca    8.6      06 Oct 2023 17:30    TPro  7.5  /  Alb  2.7<L>  /  TBili  0.4  /  DBili  x   /  AST  27  /  ALT  21  /  AlkPhos  112  10-06    PT/INR - ( 06 Oct 2023 17:30 )   PT: 10.1 sec;   INR: 0.84 ratio         PTT - ( 06 Oct 2023 17:30 )  PTT:29.3 sec  Urinalysis Basic - ( 06 Oct 2023 17:30 )    Color: x / Appearance: x / SG: x / pH: x  Gluc: 80 mg/dL / Ketone: x  / Bili: x / Urobili: x   Blood: x / Protein: x / Nitrite: x   Leuk Esterase: x / RBC: x / WBC x   Sq Epi: x / Non Sq Epi: x / Bacteria: x      < from: CT Angio Brain Stroke Protocol  w/ IV Cont (10.06.23 @ 17:43) >      IMPRESSION:    1.   Right carotid system:    Carotid bulb atherosclerotic plaque without    hemodynamically significant stenosis. Distal internal carotid tendon   low-grade stenosis, atherosclerotic versus fibromuscular dysplasia    2.   Left carotid system:     Carotid bulb atherosclerotic plaque without    hemodynamically significant stenosis. Distal internal carotid tandem   low-grade stenosis, atherosclerosis favored over fibromuscular dysplasia    3.   Vertebral circulation:    Patent.    4.  Anterior intracranial circulation:     Intracranial atherosclerosis   cavernous and clinoid segments of the internal carotid arteries,   moderate, and the middle cerebral arteries, at least moderate on the   right, lower greater on the left.    5.  Posterior intracranial circulation:    Intracranial atherosclerosis   vertebral arteries, mild-to-moderate right and moderate left, and the   vertebral artery, moderate to severe. Occlusion versus subtotal occlusion   right PCA P3 segment.    6.  Brain perfusion: Suspect small acute infarction right occipital   parietal region/right posterior cerebral arterial distribution. Note that   the region of perfusion abnormality borders regions of remote infarction   and could be artifactual.    < end of copied text >    < from: CT Brain Stroke Protocol (10.06.23 @ 17:16) >      IMPRESSION:    1. No intracranial hemorrhage is appreciated.    2. No intracranial mass lesion is appreciated.    3.  Bilateral occipital parietal remote cortical and subcortical   infarctions. Bilateral basal ganglia thalamic and cerebellar remote deep   infarctions. Ischemic white matter disease upper range typicalfor age.   Diffuse brain volume loss typical for age. Intracranial atherosclerosis.   No significant adverse interval change June 2021    4. MR may provide higher sensitivity imaging evaluation for the clinical   indication of acute infarction.      CRITICAL VALUE:    I discussed this case with the treating emergency   department physician at  10/6/2023 5:19 PM.  Hospital policies for   critical values including read back policy were followed.  The verbal   communication of the critical value supplements this written report.    --- End of Report ---      < end of copied text >

## 2023-10-07 LAB
A1C WITH ESTIMATED AVERAGE GLUCOSE RESULT: 4.7 % — SIGNIFICANT CHANGE UP (ref 4–5.6)
ANION GAP SERPL CALC-SCNC: 4 MMOL/L — LOW (ref 5–17)
ANION GAP SERPL CALC-SCNC: 6 MMOL/L — SIGNIFICANT CHANGE UP (ref 5–17)
BUN SERPL-MCNC: 28 MG/DL — HIGH (ref 7–23)
BUN SERPL-MCNC: 73 MG/DL — HIGH (ref 7–23)
CALCIUM SERPL-MCNC: 8.5 MG/DL — SIGNIFICANT CHANGE UP (ref 8.5–10.1)
CALCIUM SERPL-MCNC: 9 MG/DL — SIGNIFICANT CHANGE UP (ref 8.5–10.1)
CHLORIDE SERPL-SCNC: 93 MMOL/L — LOW (ref 96–108)
CHLORIDE SERPL-SCNC: 95 MMOL/L — LOW (ref 96–108)
CHOLEST SERPL-MCNC: 119 MG/DL — SIGNIFICANT CHANGE UP
CO2 SERPL-SCNC: 34 MMOL/L — HIGH (ref 22–31)
CO2 SERPL-SCNC: 34 MMOL/L — HIGH (ref 22–31)
CREAT SERPL-MCNC: 3.22 MG/DL — HIGH (ref 0.5–1.3)
CREAT SERPL-MCNC: 5.36 MG/DL — HIGH (ref 0.5–1.3)
EGFR: 11 ML/MIN/1.73M2 — LOW
EGFR: 20 ML/MIN/1.73M2 — LOW
ESTIMATED AVERAGE GLUCOSE: 88 MG/DL — SIGNIFICANT CHANGE UP (ref 68–114)
GLUCOSE SERPL-MCNC: 111 MG/DL — HIGH (ref 70–99)
GLUCOSE SERPL-MCNC: 76 MG/DL — SIGNIFICANT CHANGE UP (ref 70–99)
HAV IGM SER-ACNC: SIGNIFICANT CHANGE UP
HBV CORE IGM SER-ACNC: SIGNIFICANT CHANGE UP
HBV SURFACE AG SER-ACNC: SIGNIFICANT CHANGE UP
HCV AB S/CO SERPL IA: 0.11 S/CO — SIGNIFICANT CHANGE UP (ref 0–0.99)
HCV AB SERPL-IMP: SIGNIFICANT CHANGE UP
HDLC SERPL-MCNC: 46 MG/DL — SIGNIFICANT CHANGE UP
LIPID PNL WITH DIRECT LDL SERPL: 59 MG/DL — SIGNIFICANT CHANGE UP
NON HDL CHOLESTEROL: 73 MG/DL — SIGNIFICANT CHANGE UP
POTASSIUM SERPL-MCNC: 4.6 MMOL/L — SIGNIFICANT CHANGE UP (ref 3.5–5.3)
POTASSIUM SERPL-MCNC: 6.3 MMOL/L — CRITICAL HIGH (ref 3.5–5.3)
POTASSIUM SERPL-SCNC: 4.6 MMOL/L — SIGNIFICANT CHANGE UP (ref 3.5–5.3)
POTASSIUM SERPL-SCNC: 6.3 MMOL/L — CRITICAL HIGH (ref 3.5–5.3)
SODIUM SERPL-SCNC: 131 MMOL/L — LOW (ref 135–145)
SODIUM SERPL-SCNC: 135 MMOL/L — SIGNIFICANT CHANGE UP (ref 135–145)
TRIGL SERPL-MCNC: 66 MG/DL — SIGNIFICANT CHANGE UP

## 2023-10-07 PROCEDURE — 70551 MRI BRAIN STEM W/O DYE: CPT | Mod: 26

## 2023-10-07 PROCEDURE — 99232 SBSQ HOSP IP/OBS MODERATE 35: CPT

## 2023-10-07 RX ORDER — HYDRALAZINE HCL 50 MG
50 TABLET ORAL THREE TIMES A DAY
Refills: 0 | Status: DISCONTINUED | OUTPATIENT
Start: 2023-10-07 | End: 2023-10-11

## 2023-10-07 RX ORDER — LABETALOL HCL 100 MG
200 TABLET ORAL EVERY 12 HOURS
Refills: 0 | Status: DISCONTINUED | OUTPATIENT
Start: 2023-10-07 | End: 2023-10-11

## 2023-10-07 RX ADMIN — Medication 200 MILLIGRAM(S): at 17:14

## 2023-10-07 RX ADMIN — Medication 50 MILLIGRAM(S): at 21:27

## 2023-10-07 RX ADMIN — HEPARIN SODIUM 5000 UNIT(S): 5000 INJECTION INTRAVENOUS; SUBCUTANEOUS at 17:15

## 2023-10-07 RX ADMIN — HEPARIN SODIUM 5000 UNIT(S): 5000 INJECTION INTRAVENOUS; SUBCUTANEOUS at 06:51

## 2023-10-07 RX ADMIN — ATORVASTATIN CALCIUM 80 MILLIGRAM(S): 80 TABLET, FILM COATED ORAL at 21:27

## 2023-10-07 RX ADMIN — Medication 81 MILLIGRAM(S): at 14:28

## 2023-10-07 RX ADMIN — Medication 50 MILLIGRAM(S): at 14:28

## 2023-10-07 NOTE — CONSULT NOTE ADULT - ASSESSMENT
AMS  Possible right PCA stroke with occlusion  ESRD  DM2  HTN  Blind    - MRI brain pending  - continue aspirin and statin for secondary stroke prevention  - Echo  - PT/OT  - possible underlying dementia. Will need collateral from family.    Thank you for the consult.

## 2023-10-07 NOTE — PROGRESS NOTE ADULT - ASSESSMENT
68M pmhx of ESRD on HD (LUE AVF, MWF), functional quadriplegia (R sided BKA + L sided AKA,), blindness, CAD, HTN, HLD, on 2L home O2 presents as a possible stroke from HD today. was 20minutes in when he became hypotensive. they stopped and gave fluids. he normalized. they did more HD and then he became confused. EMS was called and pt was picked up. no one was certain about his baseline.  Pt seen at bedside with no acute complaints, unsure of what happened Aox1, remember he was at dialysis but unable to give any pertinent history.      AMS  R/O CVA vs Hypotension at Dialysis  -asa/statin  -MRI/PT/Neuro on board        ESRD ON HD  -MWF, received hd today  -nephro to see    Hyperkalemia  -s/p lokelma    -hd today, repeat K    Hyponatremia  -around baseline now     HTN  -home meds     Functional Quadriplegia  -supportive care/pt eval

## 2023-10-07 NOTE — CONSULT NOTE ADULT - SUBJECTIVE AND OBJECTIVE BOX
HPI: 68 year old man with hx of ESRD, blind, DM2, CAD, HTN, HLD, and PVD presenting with AMS during dialysis. Patient was hypotensive during the treatment and was rehydrated with fluids. The dialysis was re-started and patient became confused. No clear baseline. He does not recall what happened. No answer trying to reach emergency contact.    PMHx: ESRD, blind, CAD, HTN, HLD, DM2, and PVD   PSHx: Right BKA, Left AKA  FHx: DM2  Social Hx: unable to obtain due to AMS  ROS: unable to obtain due to AMS  Meds: See EMR  Allergies: NKDA    Vitals: Temp 97.7F      RR 18      HR 65       /70  General: NAD  Neuro Exam: AOx1. Follows commands. No dysarthria. No facial droop. PERRL. Blind. Cannot see motion or fingers. Able to see bright light. Tongue is midline. Palate elevates symmetrically. Shoulder shrug is intact. Moving all four extremities. Finger to nose intact. Unable to do heel to shin. Reflexes in arms and right pateller 0 and unable to check babinski. Gait exam deferred.     NIHSS- 2    CT head, CTA head/neck, and labs reviewed

## 2023-10-07 NOTE — PROGRESS NOTE ADULT - ASSESSMENT
68M pmhx of ESRD on HD (LUE AVF, MWF), functional quadriplegia (R sided BKA + L sided AKA,), blindness, CAD, HTN, HLD, on 2L home O2 presents as a possible stroke from HD today. was 20minutes in when he became hypotensive, diaphoretic. they stopped and gave fluids. he normalized. they did more HD and then he became confused. Nephrology consulted for hemodialysis    A/P    ESRD:  [facility].  Partial HD, 30 minutes on 10/6/23  HD today. Receiving HD. Tolerating HD well, Access works well  consent in chart  - Renal Diet  Monitor BMP  - Avoid nephrotoxic medications      HTN  BP is fluctuating at this time  Resume home meds  Monitor      Hyperkalemia  Received lokelma yesterday  K=6.3 at this time  HD as ordered  Give more lokelma as needed  Monitor BMP  low K diet      Anemia:  at goal  TAINA if Hg < 10.  monitor    Hyponatremia  in CKD patient  free water restriction  uf with hd as tolerated  monitor

## 2023-10-07 NOTE — PATIENT PROFILE ADULT - FALL HARM RISK - HARM RISK INTERVENTIONS
Assistance with ambulation/Assistance OOB with selected safe patient handling equipment/Communicate Risk of Fall with Harm to all staff/Discuss with provider need for PT consult/Monitor gait and stability/Reinforce activity limits and safety measures with patient and family/Tailored Fall Risk Interventions/Visual Cue: Yellow wristband and red socks/Bed in lowest position, wheels locked, appropriate side rails in place/Call bell, personal items and telephone in reach/Instruct patient to call for assistance before getting out of bed or chair/Non-slip footwear when patient is out of bed/[place] to call system/Physically safe environment - no spills, clutter or unnecessary equipment/Purposeful Proactive Rounding/Room/bathroom lighting operational, light cord in reach

## 2023-10-08 LAB
ANION GAP SERPL CALC-SCNC: 5 MMOL/L — SIGNIFICANT CHANGE UP (ref 5–17)
BUN SERPL-MCNC: 38 MG/DL — HIGH (ref 7–23)
CALCIUM SERPL-MCNC: 8.8 MG/DL — SIGNIFICANT CHANGE UP (ref 8.5–10.1)
CHLORIDE SERPL-SCNC: 96 MMOL/L — SIGNIFICANT CHANGE UP (ref 96–108)
CO2 SERPL-SCNC: 30 MMOL/L — SIGNIFICANT CHANGE UP (ref 22–31)
CREAT SERPL-MCNC: 4.15 MG/DL — HIGH (ref 0.5–1.3)
EGFR: 15 ML/MIN/1.73M2 — LOW
GLUCOSE BLDC GLUCOMTR-MCNC: 75 MG/DL — SIGNIFICANT CHANGE UP (ref 70–99)
GLUCOSE BLDC GLUCOMTR-MCNC: 79 MG/DL — SIGNIFICANT CHANGE UP (ref 70–99)
GLUCOSE BLDC GLUCOMTR-MCNC: 85 MG/DL — SIGNIFICANT CHANGE UP (ref 70–99)
GLUCOSE BLDC GLUCOMTR-MCNC: 91 MG/DL — SIGNIFICANT CHANGE UP (ref 70–99)
GLUCOSE SERPL-MCNC: 70 MG/DL — SIGNIFICANT CHANGE UP (ref 70–99)
POTASSIUM SERPL-MCNC: 5 MMOL/L — SIGNIFICANT CHANGE UP (ref 3.5–5.3)
POTASSIUM SERPL-SCNC: 5 MMOL/L — SIGNIFICANT CHANGE UP (ref 3.5–5.3)
SODIUM SERPL-SCNC: 131 MMOL/L — LOW (ref 135–145)

## 2023-10-08 PROCEDURE — 99232 SBSQ HOSP IP/OBS MODERATE 35: CPT

## 2023-10-08 RX ADMIN — HEPARIN SODIUM 5000 UNIT(S): 5000 INJECTION INTRAVENOUS; SUBCUTANEOUS at 05:52

## 2023-10-08 RX ADMIN — Medication 200 MILLIGRAM(S): at 05:53

## 2023-10-08 RX ADMIN — Medication 200 MILLIGRAM(S): at 18:03

## 2023-10-08 RX ADMIN — ATORVASTATIN CALCIUM 80 MILLIGRAM(S): 80 TABLET, FILM COATED ORAL at 22:27

## 2023-10-08 RX ADMIN — Medication 50 MILLIGRAM(S): at 05:52

## 2023-10-08 RX ADMIN — Medication 81 MILLIGRAM(S): at 13:10

## 2023-10-08 RX ADMIN — HEPARIN SODIUM 5000 UNIT(S): 5000 INJECTION INTRAVENOUS; SUBCUTANEOUS at 18:03

## 2023-10-08 RX ADMIN — Medication 50 MILLIGRAM(S): at 13:10

## 2023-10-08 RX ADMIN — Medication 50 MILLIGRAM(S): at 22:27

## 2023-10-08 NOTE — PHYSICAL THERAPY INITIAL EVALUATION ADULT - PERTINENT HX OF CURRENT PROBLEM, REHAB EVAL
68M with a PMH of ESRD on HD MWF, functional quadriplegia (R sided BKA + L sided AKA,) blindness, CAD, HTN, HLD, CVA in past, on 2L home O2 who was admitted for AMS. MR Brain 12/7/23; (-) Acute Hemorrhage. Chronic B occipital lobe, cerebellar, thalamic and basal ganglia infarcts.

## 2023-10-08 NOTE — PROGRESS NOTE ADULT - ASSESSMENT
68M pmhx of ESRD on HD (LUE AVF, MWF), functional quadriplegia (R sided BKA + L sided AKA,), blindness, CAD, HTN, HLD, on 2L home O2 presents as a possible stroke from HD today. was 20minutes in when he became hypotensive. they stopped and gave fluids. he normalized. they did more HD and then he became confused. EMS was called and pt was picked up. no one was certain about his baseline.  Pt seen at bedside with no acute complaints, unsure of what happened Aox1, remember he was at dialysis but unable to give any pertinent history.      AMS likely acute metabolic encephalopathy  cva ruled out   -asa/statin   pt eval pending        ESRD ON HD  -MWF,    -nephro      Hyperkalemia  -s/p lokelma    -improved    Hyponatremia  -around baseline now     HTN  -home meds     Functional Quadriplegia  -supportive care/pt eval

## 2023-10-08 NOTE — PHYSICAL THERAPY INITIAL EVALUATION ADULT - ADDITIONAL COMMENTS
as per previous chart, infromation obtained from pt's niece Ramonita Vincent. Pt resides in a nursing home/[facility], completley dependent with all ADLs and mobility at [facility]. Requires 2 person assist for transfers bed <> W/C or uses la lift. PT attempted to verify information calling niece at [phone] (phone disconnected), [phone] (disconnected) and [phone] (not accepting calls).

## 2023-10-08 NOTE — PHYSICAL THERAPY INITIAL EVALUATION ADULT - DID THE PATIENT HAVE SURGERY?
This is the hx of CARI. a 69 y/o male patient who was admitted to [facility] due to complications of AMS affecting medical condition and with subsequent affection on functional mobility./n/a

## 2023-10-09 ENCOUNTER — TRANSCRIPTION ENCOUNTER (OUTPATIENT)
Age: 68
End: 2023-10-09

## 2023-10-09 LAB
GLUCOSE BLDC GLUCOMTR-MCNC: 65 MG/DL — LOW (ref 70–99)
GLUCOSE BLDC GLUCOMTR-MCNC: 67 MG/DL — LOW (ref 70–99)
GLUCOSE BLDC GLUCOMTR-MCNC: 74 MG/DL — SIGNIFICANT CHANGE UP (ref 70–99)
GLUCOSE BLDC GLUCOMTR-MCNC: 83 MG/DL — SIGNIFICANT CHANGE UP (ref 70–99)

## 2023-10-09 PROCEDURE — 99239 HOSP IP/OBS DSCHRG MGMT >30: CPT

## 2023-10-09 RX ORDER — LABETALOL HCL 100 MG
1 TABLET ORAL
Qty: 0 | Refills: 0 | DISCHARGE
Start: 2023-10-09

## 2023-10-09 RX ORDER — HYDRALAZINE HCL 50 MG
1 TABLET ORAL
Qty: 0 | Refills: 0 | DISCHARGE
Start: 2023-10-09

## 2023-10-09 RX ADMIN — Medication 50 MILLIGRAM(S): at 22:11

## 2023-10-09 RX ADMIN — HEPARIN SODIUM 5000 UNIT(S): 5000 INJECTION INTRAVENOUS; SUBCUTANEOUS at 05:50

## 2023-10-09 RX ADMIN — ATORVASTATIN CALCIUM 80 MILLIGRAM(S): 80 TABLET, FILM COATED ORAL at 22:11

## 2023-10-09 RX ADMIN — Medication 81 MILLIGRAM(S): at 14:19

## 2023-10-09 RX ADMIN — HEPARIN SODIUM 5000 UNIT(S): 5000 INJECTION INTRAVENOUS; SUBCUTANEOUS at 17:31

## 2023-10-09 RX ADMIN — Medication 50 MILLIGRAM(S): at 14:19

## 2023-10-09 RX ADMIN — Medication 50 MILLIGRAM(S): at 05:50

## 2023-10-09 RX ADMIN — Medication 200 MILLIGRAM(S): at 17:30

## 2023-10-09 RX ADMIN — Medication 200 MILLIGRAM(S): at 05:50

## 2023-10-09 NOTE — PROGRESS NOTE ADULT - ASSESSMENT
ESRD:  On HD MWF at  [facility].  Partial HD, 30 minutes on 10/6/23  - Renal Diet  - Monitor BMP  - Avoid nephrotoxic medications      HTN  BP is fluctuating at this time  Resume home meds  Monitor      Hyperkalemia  HD as ordered  Give more lokelma as needed  Monitor BMP  low K diet      Anemia:  at goal  TAINA if Hg < 10.  monitor    Hyponatremia:  in CKD patient  free water restriction  uf with hd as tolerated  monitor

## 2023-10-09 NOTE — DISCHARGE NOTE PROVIDER - NSDCMRMEDTOKEN_GEN_ALL_CORE_FT
aspirin 81 mg oral delayed release tablet: 1 tab(s) orally once a day  atorvastatin 80 mg oral tablet: 1 tab(s) orally once a day (at bedtime)  calcium acetate 667 mg oral tablet: 1 tab(s) orally 3 times a day  hydrALAZINE 50 mg oral tablet: 1 tab(s) orally 3 times a day  labetalol 200 mg oral tablet: 1 tab(s) orally every 12 hours  lactulose 10 g/15 mL oral syrup: 30 milliliter(s) orally once a day (at bedtime)  latanoprost 0.005% ophthalmic solution: 1 drop(s) to each affected eye once a day (in the evening)  pantoprazole 40 mg oral delayed release tablet: 1 tab(s) orally once a day  Pred Forte 1% ophthalmic suspension: 1 drop(s) to each lt eye 2 times a day  senna oral tablet: 2 tab(s) orally once a day (at bedtime)

## 2023-10-09 NOTE — PROGRESS NOTE ADULT - ASSESSMENT
Subjective Complaints:  Historian:             Vital Signs Last 24 Hrs  T(C): 36.3 (09 Oct 2023 16:15), Max: 36.6 (09 Oct 2023 10:05)  T(F): 97.3 (09 Oct 2023 16:15), Max: 97.9 (09 Oct 2023 10:05)  HR: 62 (09 Oct 2023 22:08) (57 - 73)  BP: 175/68 (09 Oct 2023 22:08) (114/52 - 178/67)  BP(mean): --  RR: 18 (09 Oct 2023 22:08) (16 - 20)  SpO2: 97% (09 Oct 2023 22:08) (95% - 100%)    Parameters below as of 09 Oct 2023 22:08  Patient On (Oxygen Delivery Method): room air        GENERAL PHYSICAL EXAM:  General:  Appears stated age, well-groomed, well-nourished, no distress  HEENT:  NC/AT, patent nares w/ pink mucosa, OP clear w/o lesions, PERRL, EOMI, conjunctivae clear, no thyromegaly, nodules, adenopathy, no JVD  Chest:  Full & symmetric excursion, no increased effort, breath sounds clear  Cardiovascular:  Regular rhythm, S1, S2, no murmur/rub/S3/S4, no carotid/femoral/abdominal bruit, radial/pedal pulses 2+, no edema  Abdomen:  Soft, non-tender, non-distended, normoactive bowel sounds, no HSM  Extremities:  Gait & station:   Digits:   Nails:   Joints, Bones, Muscles:   ROM:   Stability:  Skin:  No rash/erythema/ecchymoses/petechiae/wounds/abscess/warm/dry  Musculoskeletal:  Full ROM in all joints w/o swelling/tenderness/effusion        LABS:    10-08    131<L>  |  96  |  38<H>  ----------------------------<  70  5.0   |  30  |  4.15<H>    Ca    8.8      08 Oct 2023 05:15        Urinalysis Basic - ( 08 Oct 2023 05:15 )    Color: x / Appearance: x / SG: x / pH: x  Gluc: 70 mg/dL / Ketone: x  / Bili: x / Urobili: x   Blood: x / Protein: x / Nitrite: x   Leuk Esterase: x / RBC: x / WBC x   Sq Epi: x / Non Sq Epi: x / Bacteria: x        RADIOLOGY & ADDITIONAL STUDIES:        Neurology Progress Note:      Mental Status: awaek alert  speech  fluent       Cranial Nerves: blindness       Motor:   arm leg s 3/4 emilia amputation         Sensory: intact      Cerebellar: defrd      Gait:defrd      Assesment/Plan: emilia old cva ,     blindness ,    htn diabetes , on asa , no seizure esrd ,    primary md  noted ,

## 2023-10-09 NOTE — DIETITIAN INITIAL EVALUATION ADULT - PERTINENT LABORATORY DATA
10-08    131<L>  |  96  |  38<H>  ----------------------------<  70  5.0   |  30  |  4.15<H>    Ca    8.8      08 Oct 2023 05:15    POCT Blood Glucose.: 74 mg/dL (10-09-23 @ 08:03)  A1C with Estimated Average Glucose Result: 4.7 % (10-07-23 @ 09:30)  A1C with Estimated Average Glucose Result: 5.1 % (06-23-23 @ 10:25)

## 2023-10-09 NOTE — DISCHARGE NOTE PROVIDER - DETAILS OF MALNUTRITION DIAGNOSIS/DIAGNOSES
This patient has been assessed with a concern for Malnutrition and was treated during this hospitalization for the following Nutrition diagnosis/diagnoses:     -  10/09/2023: Underweight (BMI < 19)

## 2023-10-09 NOTE — DISCHARGE NOTE PROVIDER - HOSPITAL COURSE
68M pmhx of ESRD on HD (LUE AVF, MWF), functional quadriplegia (R sided BKA + L sided AKA,), blindness, CAD, HTN, HLD, on 2L home O2 presents as a possible stroke from HD today. was 20minutes in when he became hypotensive. they stopped and gave fluids. he normalized. they did more HD and then he became confused. EMS was called and pt was picked up. no one was certain about his baseline.  Pt seen at bedside with no acute complaints, unsure of what happened Aox1, remember he was at dialysis but unable to give any pertinent history.      AMS likely acute metabolic encephalopathy  cva ruled out   -asa/statin          ESRD ON HD  -MWF,    -nephro      Hyperkalemia  -s/p lokelma    -improved    Hyponatremia  -around baseline now     HTN  -home meds     Functional Quadriplegia  -supportive care no pt needs         pt seen and examined 45 min spent on dc planning     Lab test review, Radiology Review, Vitals review, Consultant review and discussion, Physical examination, IDR, Assessment and plan; Plan discussion with patient and family

## 2023-10-09 NOTE — DIETITIAN INITIAL EVALUATION ADULT - NS FNS DIET ORDER
Diet, Pureed:   For patients receiving Renal Replacement - No Protein Restr, No Conc K, No Conc Phos, Low Sodium (RENAL) (10-06-23 @ 19:04) [Active]

## 2023-10-09 NOTE — DIETITIAN INITIAL EVALUATION ADULT - ETIOLOGY
inability to consume sufficient energy increased physiological demand for protein-energy in pt with ESRD on HD

## 2023-10-09 NOTE — DIETITIAN INITIAL EVALUATION ADULT - PERTINENT MEDS FT
MEDICATIONS  (STANDING):  aspirin enteric coated 81 milliGRAM(s) Oral daily  atorvastatin 80 milliGRAM(s) Oral at bedtime  heparin   Injectable 5000 Unit(s) SubCutaneous every 12 hours  hydrALAZINE 50 milliGRAM(s) Oral three times a day  labetalol 200 milliGRAM(s) Oral every 12 hours

## 2023-10-09 NOTE — DIETITIAN INITIAL EVALUATION ADULT - DIET TYPE
Nepro x 1/day (provides 425 kcal, 19 g protein)/pureed/renal replacement pts:no protein restr,no conc K & phos, low sodium

## 2023-10-09 NOTE — DIETITIAN INITIAL EVALUATION ADULT - OTHER INFO
Pt asleep at HD at time of visit. Pt with ESRD on HD; presented from HD with AMS and hypotension.  Pt with LLE AKA and RLE BKA.  Weight history as per previous RD note (06/23/2023): 54.6kg.  RD remains available.

## 2023-10-09 NOTE — DISCHARGE NOTE PROVIDER - NSDCCPCAREPLAN_GEN_ALL_CORE_FT
PRINCIPAL DISCHARGE DIAGNOSIS  Diagnosis: AMS (altered mental status)  Assessment and Plan of Treatment: at baseline  continue with all meds as prescribed  dialysis

## 2023-10-09 NOTE — DISCHARGE NOTE PROVIDER - NSDCFUSCHEDAPPT_GEN_ALL_CORE_FT
[facility] Physician [facility]  ELECTROPH [phone]  Scheduled Appointment: 11/06/2023    Farrah Dodd  [facility] Physician [facility]  PULMMED [address] [facility]  Scheduled Appointment: 11/07/2023

## 2023-10-10 LAB
ANION GAP SERPL CALC-SCNC: 3 MMOL/L — LOW (ref 5–17)
APTT BLD: 34.7 SEC — SIGNIFICANT CHANGE UP (ref 24.5–35.6)
BUN SERPL-MCNC: 39 MG/DL — HIGH (ref 7–23)
CALCIUM SERPL-MCNC: 8.8 MG/DL — SIGNIFICANT CHANGE UP (ref 8.5–10.1)
CHLORIDE SERPL-SCNC: 95 MMOL/L — LOW (ref 96–108)
CO2 SERPL-SCNC: 32 MMOL/L — HIGH (ref 22–31)
CREAT SERPL-MCNC: 4.7 MG/DL — HIGH (ref 0.5–1.3)
EGFR: 13 ML/MIN/1.73M2 — LOW
GLUCOSE BLDC GLUCOMTR-MCNC: 161 MG/DL — HIGH (ref 70–99)
GLUCOSE BLDC GLUCOMTR-MCNC: 95 MG/DL — SIGNIFICANT CHANGE UP (ref 70–99)
GLUCOSE SERPL-MCNC: 139 MG/DL — HIGH (ref 70–99)
HCT VFR BLD CALC: 37.2 % — LOW (ref 39–50)
HGB BLD-MCNC: 11.7 G/DL — LOW (ref 13–17)
INR BLD: 0.87 RATIO — SIGNIFICANT CHANGE UP (ref 0.85–1.18)
MCHC RBC-ENTMCNC: 28.1 PG — SIGNIFICANT CHANGE UP (ref 27–34)
MCHC RBC-ENTMCNC: 31.5 G/DL — LOW (ref 32–36)
MCV RBC AUTO: 89.2 FL — SIGNIFICANT CHANGE UP (ref 80–100)
NRBC # BLD: 0 /100 WBCS — SIGNIFICANT CHANGE UP (ref 0–0)
PLATELET # BLD AUTO: 141 K/UL — LOW (ref 150–400)
POTASSIUM SERPL-MCNC: 5.4 MMOL/L — HIGH (ref 3.5–5.3)
POTASSIUM SERPL-SCNC: 5.4 MMOL/L — HIGH (ref 3.5–5.3)
PROTHROM AB SERPL-ACNC: 10.5 SEC — SIGNIFICANT CHANGE UP (ref 9.5–13)
RBC # BLD: 4.17 M/UL — LOW (ref 4.2–5.8)
RBC # FLD: 17.9 % — HIGH (ref 10.3–14.5)
SODIUM SERPL-SCNC: 130 MMOL/L — LOW (ref 135–145)
WBC # BLD: 4.98 K/UL — SIGNIFICANT CHANGE UP (ref 3.8–10.5)
WBC # FLD AUTO: 4.98 K/UL — SIGNIFICANT CHANGE UP (ref 3.8–10.5)

## 2023-10-10 PROCEDURE — 74176 CT ABD & PELVIS W/O CONTRAST: CPT | Mod: 26

## 2023-10-10 PROCEDURE — 99232 SBSQ HOSP IP/OBS MODERATE 35: CPT

## 2023-10-10 RX ORDER — ONDANSETRON 8 MG/1
4 TABLET, FILM COATED ORAL ONCE
Refills: 0 | Status: DISCONTINUED | OUTPATIENT
Start: 2023-10-10 | End: 2023-10-10

## 2023-10-10 RX ORDER — ONDANSETRON 8 MG/1
4 TABLET, FILM COATED ORAL ONCE
Refills: 0 | Status: COMPLETED | OUTPATIENT
Start: 2023-10-10 | End: 2023-10-10

## 2023-10-10 RX ORDER — OXYMETAZOLINE HYDROCHLORIDE 0.5 MG/ML
1 SPRAY NASAL
Refills: 0 | Status: DISCONTINUED | OUTPATIENT
Start: 2023-10-10 | End: 2023-10-11

## 2023-10-10 RX ORDER — ONDANSETRON 8 MG/1
4 TABLET, FILM COATED ORAL EVERY 8 HOURS
Refills: 0 | Status: DISCONTINUED | OUTPATIENT
Start: 2023-10-10 | End: 2023-10-11

## 2023-10-10 RX ORDER — IOHEXOL 300 MG/ML
30 INJECTION, SOLUTION INTRAVENOUS ONCE
Refills: 0 | Status: COMPLETED | OUTPATIENT
Start: 2023-10-10 | End: 2023-10-10

## 2023-10-10 RX ADMIN — OXYMETAZOLINE HYDROCHLORIDE 1 SPRAY(S): 0.5 SPRAY NASAL at 03:11

## 2023-10-10 RX ADMIN — Medication 200 MILLIGRAM(S): at 17:21

## 2023-10-10 RX ADMIN — ONDANSETRON 4 MILLIGRAM(S): 8 TABLET, FILM COATED ORAL at 12:33

## 2023-10-10 RX ADMIN — ATORVASTATIN CALCIUM 80 MILLIGRAM(S): 80 TABLET, FILM COATED ORAL at 22:09

## 2023-10-10 RX ADMIN — Medication 50 MILLIGRAM(S): at 22:09

## 2023-10-10 RX ADMIN — Medication 200 MILLIGRAM(S): at 05:36

## 2023-10-10 RX ADMIN — IOHEXOL 30 MILLILITER(S): 300 INJECTION, SOLUTION INTRAVENOUS at 18:08

## 2023-10-10 RX ADMIN — OXYMETAZOLINE HYDROCHLORIDE 1 SPRAY(S): 0.5 SPRAY NASAL at 17:20

## 2023-10-10 RX ADMIN — Medication 81 MILLIGRAM(S): at 12:33

## 2023-10-10 RX ADMIN — HEPARIN SODIUM 5000 UNIT(S): 5000 INJECTION INTRAVENOUS; SUBCUTANEOUS at 17:20

## 2023-10-10 RX ADMIN — ONDANSETRON 4 MILLIGRAM(S): 8 TABLET, FILM COATED ORAL at 20:25

## 2023-10-10 RX ADMIN — Medication 50 MILLIGRAM(S): at 05:36

## 2023-10-10 RX ADMIN — Medication 50 MILLIGRAM(S): at 13:19

## 2023-10-10 NOTE — PROGRESS NOTE ADULT - ASSESSMENT
ESRD:  On HD MWF at  [facility].  - HD on MWF.  - Renal Diet  - Monitor BMP  - Avoid nephrotoxic medications      HTN  BP is fluctuating at this time  Resume home meds  Monitor      Hyperkalemia  HD as ordered  Give more lokelma as needed  Monitor BMP  low K diet      Anemia:  at goal  TAINA if Hg < 10.  monitor    Hyponatremia:  Volume expanded.  Improved now.

## 2023-10-10 NOTE — PROGRESS NOTE ADULT - ASSESSMENT
Subjective Complaints:  Historian:             Vital Signs Last 24 Hrs  T(C): 36.8 (10 Oct 2023 16:22), Max: 36.8 (10 Oct 2023 09:08)  T(F): 98.3 (10 Oct 2023 16:22), Max: 98.3 (10 Oct 2023 16:22)  HR: 98 (10 Oct 2023 22:02) (61 - 98)  BP: 195/76 (10 Oct 2023 22:02) (159/57 - 195/76)  BP(mean): --  RR: 17 (10 Oct 2023 16:22) (17 - 18)  SpO2: 97% (10 Oct 2023 16:22) (96% - 99%)    Parameters below as of 10 Oct 2023 04:49  Patient On (Oxygen Delivery Method): room air        GENERAL PHYSICAL EXAM:  General:  Appears stated age, well-groomed, well-nourished, no distress  HEENT:  NC/AT, patent nares w/ pink mucosa, OP clear w/o lesions, PERRL, EOMI, conjunctivae clear, no thyromegaly, nodules, adenopathy, no JVD  Chest:  Full & symmetric excursion, no increased effort, breath sounds clear  Cardiovascular:  Regular rhythm, S1, S2, no murmur/rub/S3/S4, no carotid/femoral/abdominal bruit, radial/pedal pulses 2+, no edema  Abdomen:  Soft, non-tender, non-distended, normoactive bowel sounds, no HSM  Extremities:  Gait & station:   Digits:   Nails:   Joints, Bones, Muscles:   ROM:   Stability:  Skin:  No rash/erythema/ecchymoses/petechiae/wounds/abscess/warm/dry  Musculoskeletal:  Full ROM in all joints w/o swelling/tenderness/effusion        LABS:                        11.7   4.98  )-----------( 141      ( 10 Oct 2023 03:50 )             37.2     10-10    130<L>  |  95<L>  |  39<H>  ----------------------------<  139<H>  5.4<H>   |  32<H>  |  4.70<H>    Ca    8.8      10 Oct 2023 16:06      PT/INR - ( 10 Oct 2023 03:50 )   PT: 10.5 sec;   INR: 0.87 ratio         PTT - ( 10 Oct 2023 03:50 )  PTT:34.7 sec  Urinalysis Basic - ( 10 Oct 2023 16:06 )    Color: x / Appearance: x / SG: x / pH: x  Gluc: 139 mg/dL / Ketone: x  / Bili: x / Urobili: x   Blood: x / Protein: x / Nitrite: x   Leuk Esterase: x / RBC: x / WBC x   Sq Epi: x / Non Sq Epi: x / Bacteria: x        RADIOLOGY & ADDITIONAL STUDIES:        Neurology Progress Note:      Mental Status: awaek alert speech fluent       Cranial Nerves: defrd      Motor:   arm 3/5 legs amputated emilia,         Sensory: intact      Cerebellar: defrd      Gait:derd      Assesment/Plan: esrd , had epistaxis ,    ent noted left nostril ,   esrd on dialysis ,   htn diabetes ,    on asa , primary md noted and ent and renal noted ,    for sub acute rehab

## 2023-10-10 NOTE — CHART NOTE - NSCHARTNOTEFT_GEN_A_CORE
Called by RN to evaluate patient for epistaxis.  Patient noted to have epistaxis from L nare.  Afrin applied to both nostrils and pressure held for 10 minutes.  No active bleeding noted.  Will hold am dose asa and heparin.  F/u labs cbc and coags.  Will continue to monitor.

## 2023-10-10 NOTE — PROGRESS NOTE ADULT - ASSESSMENT
68M pmhx of ESRD on HD (LUE AVF, MWF), functional quadriplegia (R sided BKA + L sided AKA,), blindness, CAD, HTN, HLD, on 2L home O2 presents as a possible stroke from HD today. was 20minutes in when he became hypotensive. they stopped and gave fluids. he normalized. they did more HD and then he became confused. EMS was called and pt was picked up. no one was certain about his baseline.  Pt seen at bedside with no acute complaints, unsure of what happened Aox1, remember he was at dialysis but unable to give any pertinent history.      AMS likely acute metabolic encephalopathy  cva ruled out   -asa/statin          ESRD ON HD  -MWF,    -nephro      Hyperkalemia  -s/p lokelma    -improved    Hyponatremia  -around baseline now     HTN  -home meds     Functional Quadriplegia  -supportive care no pt needs    68M pmhx of ESRD on HD (LUE AVF, MWF), functional quadriplegia (R sided BKA + L sided AKA,), blindness, CAD, HTN, HLD, on 2L home O2 presents as a possible stroke from HD today. was 20minutes in when he became hypotensive. they stopped and gave fluids. he normalized. they did more HD and then he became confused. EMS was called and pt was picked up. no one was certain about his baseline.  Pt seen at bedside with no acute complaints, unsure of what happened Aox1, remember he was at dialysis but unable to give any pertinent history.      AMS likely acute metabolic encephalopathy  cva ruled out   -asa/statin     Nause Vomiting  -pt unable to tolerate lunch, belly soft, diminished bs , will get CT abd       ESRD ON HD  -MWF,    -nephro      Hyperkalemia  -s/p lokelma    -improved    Hyponatremia  -around baseline now     HTN  -home meds     Functional Quadriplegia  -supportive care no pt needs

## 2023-10-11 ENCOUNTER — TRANSCRIPTION ENCOUNTER (OUTPATIENT)
Age: 68
End: 2023-10-11

## 2023-10-11 VITALS — SYSTOLIC BLOOD PRESSURE: 179 MMHG | DIASTOLIC BLOOD PRESSURE: 67 MMHG | HEART RATE: 64 BPM

## 2023-10-11 LAB
ANION GAP SERPL CALC-SCNC: 6 MMOL/L — SIGNIFICANT CHANGE UP (ref 5–17)
BUN SERPL-MCNC: 45 MG/DL — HIGH (ref 7–23)
CALCIUM SERPL-MCNC: 9.2 MG/DL — SIGNIFICANT CHANGE UP (ref 8.5–10.1)
CHLORIDE SERPL-SCNC: 96 MMOL/L — SIGNIFICANT CHANGE UP (ref 96–108)
CO2 SERPL-SCNC: 31 MMOL/L — SIGNIFICANT CHANGE UP (ref 22–31)
CREAT SERPL-MCNC: 5.53 MG/DL — HIGH (ref 0.5–1.3)
EGFR: 10 ML/MIN/1.73M2 — LOW
GLUCOSE SERPL-MCNC: 93 MG/DL — SIGNIFICANT CHANGE UP (ref 70–99)
POTASSIUM SERPL-MCNC: 5.3 MMOL/L — SIGNIFICANT CHANGE UP (ref 3.5–5.3)
POTASSIUM SERPL-SCNC: 5.3 MMOL/L — SIGNIFICANT CHANGE UP (ref 3.5–5.3)
SODIUM SERPL-SCNC: 133 MMOL/L — LOW (ref 135–145)

## 2023-10-11 PROCEDURE — 99232 SBSQ HOSP IP/OBS MODERATE 35: CPT

## 2023-10-11 RX ORDER — ONDANSETRON 8 MG/1
4 TABLET, FILM COATED ORAL EVERY 8 HOURS
Refills: 0 | Status: DISCONTINUED | OUTPATIENT
Start: 2023-10-11 | End: 2023-10-11

## 2023-10-11 RX ORDER — SODIUM CHLORIDE 9 MG/ML
100 INJECTION INTRAMUSCULAR; INTRAVENOUS; SUBCUTANEOUS
Refills: 0 | Status: DISCONTINUED | OUTPATIENT
Start: 2023-10-11 | End: 2023-10-11

## 2023-10-11 RX ADMIN — Medication 200 MILLIGRAM(S): at 05:19

## 2023-10-11 RX ADMIN — HEPARIN SODIUM 5000 UNIT(S): 5000 INJECTION INTRAVENOUS; SUBCUTANEOUS at 05:19

## 2023-10-11 RX ADMIN — HEPARIN SODIUM 5000 UNIT(S): 5000 INJECTION INTRAVENOUS; SUBCUTANEOUS at 17:23

## 2023-10-11 RX ADMIN — ATORVASTATIN CALCIUM 80 MILLIGRAM(S): 80 TABLET, FILM COATED ORAL at 21:36

## 2023-10-11 RX ADMIN — OXYMETAZOLINE HYDROCHLORIDE 1 SPRAY(S): 0.5 SPRAY NASAL at 05:20

## 2023-10-11 RX ADMIN — Medication 50 MILLIGRAM(S): at 21:36

## 2023-10-11 RX ADMIN — OXYMETAZOLINE HYDROCHLORIDE 1 SPRAY(S): 0.5 SPRAY NASAL at 17:24

## 2023-10-11 RX ADMIN — ONDANSETRON 4 MILLIGRAM(S): 8 TABLET, FILM COATED ORAL at 21:36

## 2023-10-11 RX ADMIN — Medication 50 MILLIGRAM(S): at 05:19

## 2023-10-11 RX ADMIN — Medication 200 MILLIGRAM(S): at 17:23

## 2023-10-11 RX ADMIN — Medication 81 MILLIGRAM(S): at 13:26

## 2023-10-11 RX ADMIN — Medication 50 MILLIGRAM(S): at 13:26

## 2023-10-11 NOTE — PROGRESS NOTE ADULT - SUBJECTIVE AND OBJECTIVE BOX
JAMES PATRICK  68y  Patient is a 68y old  Male who presents with a chief complaint of AMS     (09 Oct 2023 13:33)    HPI:  Seen at HD.  1 L UF only due to hypotension.  No new complaints.    HEALTH ISSUES - PROBLEM Dx:        MEDICATIONS  (STANDING):  aspirin enteric coated 81 milliGRAM(s) Oral daily  atorvastatin 80 milliGRAM(s) Oral at bedtime  heparin   Injectable 5000 Unit(s) SubCutaneous every 12 hours  hydrALAZINE 50 milliGRAM(s) Oral three times a day  labetalol 200 milliGRAM(s) Oral every 12 hours    MEDICATIONS  (PRN):    Vital Signs Last 24 Hrs  T(C): 36.4 (09 Oct 2023 13:14), Max: 36.6 (09 Oct 2023 10:05)  T(F): 97.6 (09 Oct 2023 13:14), Max: 97.9 (09 Oct 2023 10:05)  HR: 73 (09 Oct 2023 14:21) (57 - 73)  BP: 168/72 (09 Oct 2023 14:21) (114/52 - 178/67)  BP(mean): --  RR: 16 (09 Oct 2023 13:14) (16 - 20)  SpO2: 100% (09 Oct 2023 13:14) (95% - 100%)    Parameters below as of 09 Oct 2023 13:14  Patient On (Oxygen Delivery Method): room air      Daily Height in cm: 167.64 (09 Oct 2023 09:52)    Daily Weight in k (09 Oct 2023 13:14)    PHYSICAL EXAM:  Constitutional:  He appears comfortable and not distressed. Not diaphoretic.    Neck:  The thyroid is normal. Trachea is midline.     Breasts: Normal examination.    Respiratory: The lungs are clear to auscultation. No dullness and expansion is normal.    Cardiovascular: S1 and S2 are normal. No mummurs, rubs or gallops are present.    Gastrointestinal: The abdomen is soft. No tenderness is present. No masses are present. Bowel sounds are normal.    Genitourinary: The bladder is not distended. No CVA tenderness is present.    Extremities: No edema is noted. No deformities are present.    Neurological: Cognition is normal. Tone, power and sensation are normal. Gait is steady.    Skin: No lesions are seen  or palpated.    Lymph Nodes: No lymphadenopathy is present.          10-08    131<L>  |  96  |  38<H>  ----------------------------<  70  5.0   |  30  |  4.15<H>    Ca    8.8      08 Oct 2023 05:15    
Neurology Progress Note    No acute events.     Neuro Exam: AOx1. Follows commands. No dysarthria. No facial droop. Blind. Cannot see motion or fingers. Able to see bright light. Moving all four extremities.     CTA head/neck- possible right PCA occlusion  MRI brain- Chronic bilateral occipital lobe, cerebellar, thalamic, and basal ganglia infarcts. 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 1.9 cm left vestibular schwannoma.  LDL- 59  HgbA1c- 4.7    A/P:  AMS  Old bilateral PCA strokes  Probable chronic right PCA stroke with occlusion  ESRD  DM2  HTN  Blind  - continue aspirin and statin for secondary stroke prevention  - PT/OT  - possible underlying dementia.   - will sign off. Please re-consult as needed.
Patient is a 68y old  Male who presents with a chief complaint of r/o cva (08 Oct 2023 09:57)    INTERVAL HPI/OVERNIGHT EVENTS:    MEDICATIONS  (STANDING):  aspirin enteric coated 81 milliGRAM(s) Oral daily  atorvastatin 80 milliGRAM(s) Oral at bedtime  heparin   Injectable 5000 Unit(s) SubCutaneous every 12 hours  hydrALAZINE 50 milliGRAM(s) Oral three times a day  labetalol 200 milliGRAM(s) Oral every 12 hours    MEDICATIONS  (PRN):    Allergies    No Known Allergies    Intolerances      REVIEW OF SYSTEMS:  All other systems reviewed and are negative    Vital Signs Last 24 Hrs  T(C): 36.4 (08 Oct 2023 04:55), Max: 36.9 (07 Oct 2023 23:51)  T(F): 97.5 (08 Oct 2023 04:55), Max: 98.4 (07 Oct 2023 23:51)  HR: 65 (08 Oct 2023 04:55) (62 - 68)  BP: 156/68 (08 Oct 2023 04:55) (149/67 - 176/73)  BP(mean): 105 (07 Oct 2023 21:26) (105 - 105)  RR: 18 (08 Oct 2023 04:55) (18 - 20)  SpO2: 99% (08 Oct 2023 04:55) (95% - 100%)    Parameters below as of 08 Oct 2023 04:55  Patient On (Oxygen Delivery Method): room air      Daily     Daily Weight in k.4 (08 Oct 2023 04:55)  I&O's Summary    07 Oct 2023 07:01  -  08 Oct 2023 07:00  --------------------------------------------------------  IN: 840 mL / OUT: 0 mL / NET: 840 mL      CAPILLARY BLOOD GLUCOSE      POCT Blood Glucose.: 75 mg/dL (08 Oct 2023 07:46)    PHYSICAL EXAM:  GENERAL: NAD,    HEAD:  Atraumatic, Normocephalic  EYES: EOMI, PERRLA, conjunctiva and sclera clear  ENMT: No tonsillar erythema, exudates, or enlargement; Moist mucous membranes, Good dentition, No lesions  NECK: Supple, No JVD, Normal thyroid  NERVOUS SYSTEM:  Alert & Oriented X3, Good concentration; Motor Strength 5/5 B/L upper and lower extremities; DTRs 2+ intact and symmetric  CHEST/LUNG: Clear to percussion bilaterally; No rales, rhonchi, wheezing, or rubs  HEART: Regular rate and rhythm; No murmurs, rubs, or gallops  ABDOMEN: Soft, Nontender, Nondistended; Bowel sounds present  EXTREMITIES:  2+ Peripheral Pulses, No clubbing, cyanosis, or edema  LYMPH: No lymphadenopathy noted  SKIN: No rashes or lesions  Labs                          12.7   5.27  )-----------( 142      ( 06 Oct 2023 17:30 )             39.4     10-08    131<L>  |  96  |  38<H>  ----------------------------<  70  5.0   |  30  |  4.15<H>    Ca    8.8      08 Oct 2023 05:15    TPro  7.5  /  Alb  2.7<L>  /  TBili  0.4  /  DBili  x   /  AST  27  /  ALT  21  /  AlkPhos  112  10-06    PT/INR - ( 06 Oct 2023 17:30 )   PT: 10.1 sec;   INR: 0.84 ratio         PTT - ( 06 Oct 2023 17:30 )  PTT:29.3 sec      Urinalysis Basic - ( 08 Oct 2023 05:15 )    Color: x / Appearance: x / SG: x / pH: x  Gluc: 70 mg/dL / Ketone: x  / Bili: x / Urobili: x   Blood: x / Protein: x / Nitrite: x   Leuk Esterase: x / RBC: x / WBC x   Sq Epi: x / Non Sq Epi: x / Bacteria: x                  DVT prophylaxis: > Lovenox 40mg SQ daily  > Heparin   > SCD's
Patient is a 68y old  Male who presents with a chief complaint of r/o cva (11 Oct 2023 09:54)    INTERVAL HPI/OVERNIGHT EVENTS:    MEDICATIONS  (STANDING):  aspirin enteric coated 81 milliGRAM(s) Oral daily  atorvastatin 80 milliGRAM(s) Oral at bedtime  heparin   Injectable 5000 Unit(s) SubCutaneous every 12 hours  hydrALAZINE 50 milliGRAM(s) Oral three times a day  labetalol 200 milliGRAM(s) Oral every 12 hours  oxymetazoline 0.05% Nasal Spray 1 Spray(s) Both Nostrils two times a day    MEDICATIONS  (PRN):  ondansetron    Tablet 4 milliGRAM(s) Oral every 8 hours PRN Nausea and/or Vomiting  sodium chloride 0.9% Bolus. 100 milliLiter(s) IV Bolus every 5 minutes PRN SBP LESS THAN or EQUAL to 90 mmHg    Allergies    No Known Allergies    Intolerances      REVIEW OF SYSTEMS:  All other systems reviewed and are negative    Vital Signs Last 24 Hrs  T(C): 36.4 (11 Oct 2023 05:14), Max: 36.8 (10 Oct 2023 16:22)  T(F): 97.5 (11 Oct 2023 05:14), Max: 98.3 (10 Oct 2023 16:22)  HR: 68 (11 Oct 2023 05:07) (63 - 98)  BP: 175/74 (11 Oct 2023 05:07) (164/64 - 195/76)  BP(mean): --  RR: 18 (11 Oct 2023 05:07) (17 - 18)  SpO2: 98% (11 Oct 2023 05:07) (97% - 98%)    Parameters below as of 11 Oct 2023 05:07  Patient On (Oxygen Delivery Method): room air      Daily     Daily   I&O's Summary    CAPILLARY BLOOD GLUCOSE      POCT Blood Glucose.: 161 mg/dL (10 Oct 2023 19:38)    PHYSICAL EXAM:  GENERAL: NAD,    HEAD:  Atraumatic, Normocephalic  EYES: EOMI, PERRLA, conjunctiva and sclera clear  ENMT: No tonsillar erythema, exudates, or enlargement; Moist mucous membranes, Good dentition, No lesions  NECK: Supple, No JVD, Normal thyroid  NERVOUS SYSTEM:  Alert & Oriented X1 appears at baseline, responding appropriately  Good concentration; Motor Strength 5/5 B/L upper ext   CHEST/LUNG: Clear to percussion bilaterally; No rales, rhonchi, wheezing, or rubs  HEART: Regular rate and rhythm; No murmurs, rubs, or gallops  ABDOMEN: Soft, Nontender, Nondistended; Bowel sounds present  EXTREMITIES:  2+ Peripheral Pulses, No clubbing, cyanosis, or edema  LYMPH: No lymphadenopathy noted  SKIN: No rashes or lesions    Labs                          11.7   4.98  )-----------( 141      ( 10 Oct 2023 03:50 )             37.2     10-11    133<L>  |  96  |  45<H>  ----------------------------<  93  5.3   |  31  |  5.53<H>    Ca    9.2      11 Oct 2023 07:10      PT/INR - ( 10 Oct 2023 03:50 )   PT: 10.5 sec;   INR: 0.87 ratio         PTT - ( 10 Oct 2023 03:50 )  PTT:34.7 sec      Urinalysis Basic - ( 11 Oct 2023 07:10 )    Color: x / Appearance: x / SG: x / pH: x  Gluc: 93 mg/dL / Ketone: x  / Bili: x / Urobili: x   Blood: x / Protein: x / Nitrite: x   Leuk Esterase: x / RBC: x / WBC x   Sq Epi: x / Non Sq Epi: x / Bacteria: x                  DVT prophylaxis: > Lovenox 40mg SQ daily  > Heparin   > SCD's
JAMES PATRICK  68y  Patient is a 68y old  Male who presents with a chief complaint of r/o cva (10 Oct 2023 22:21)    HPI: Seen in HD. Awake but lethargic. Responds in creole.    HEALTH ISSUES - PROBLEM Dx:        MEDICATIONS  (STANDING):  aspirin enteric coated 81 milliGRAM(s) Oral daily  atorvastatin 80 milliGRAM(s) Oral at bedtime  heparin   Injectable 5000 Unit(s) SubCutaneous every 12 hours  hydrALAZINE 50 milliGRAM(s) Oral three times a day  labetalol 200 milliGRAM(s) Oral every 12 hours  oxymetazoline 0.05% Nasal Spray 1 Spray(s) Both Nostrils two times a day    MEDICATIONS  (PRN):  ondansetron    Tablet 4 milliGRAM(s) Oral every 8 hours PRN Nausea and/or Vomiting  sodium chloride 0.9% Bolus. 100 milliLiter(s) IV Bolus every 5 minutes PRN SBP LESS THAN or EQUAL to 90 mmHg    Vital Signs Last 24 Hrs  T(C): 36.4 (11 Oct 2023 05:14), Max: 36.8 (10 Oct 2023 16:22)  T(F): 97.5 (11 Oct 2023 05:14), Max: 98.3 (10 Oct 2023 16:22)  HR: 68 (11 Oct 2023 05:07) (63 - 98)  BP: 175/74 (11 Oct 2023 05:07) (164/64 - 195/76)  BP(mean): --  RR: 18 (11 Oct 2023 05:07) (17 - 18)  SpO2: 98% (11 Oct 2023 05:07) (97% - 98%)    Parameters below as of 11 Oct 2023 05:07  Patient On (Oxygen Delivery Method): room air      Daily     Daily     PHYSICAL EXAM:  Constitutional:  He appears comfortable and not distressed. Not diaphoretic.    Neck:  The thyroid is normal. Trachea is midline.     Respiratory: The lungs are clear to auscultation. No dullness and expansion is normal.    Cardiovascular: S1 and S2 are normal. No murmurs, rubs or gallops are present.    Gastrointestinal: The abdomen is soft. No tenderness is present.     Genitourinary: The bladder is not distended. No CVA tenderness is present.    Extremities: No edema is noted. Left BKA and Right AKA.    Neurological: Confused and lethargic.    Skin: Rash on right forearm.    Lymph Nodes: No lymphadenopathy is present.                              11.7   4.98  )-----------( 141      ( 10 Oct 2023 03:50 )             37.2     10-11    133<L>  |  96  |  45<H>  ----------------------------<  93  5.3   |  31  |  5.53<H>    Ca    9.2      11 Oct 2023 07:10    
Patient is a 68y old  Male who presents with a chief complaint of r/o cva (07 Oct 2023 11:22)    INTERVAL HPI/OVERNIGHT EVENTS:    MEDICATIONS  (STANDING):  aspirin enteric coated 81 milliGRAM(s) Oral daily  atorvastatin 80 milliGRAM(s) Oral at bedtime  heparin   Injectable 5000 Unit(s) SubCutaneous every 12 hours    MEDICATIONS  (PRN):    Allergies    No Known Allergies    Intolerances      REVIEW OF SYSTEMS:  All other systems reviewed and are negative    Vital Signs Last 24 Hrs  T(C): 36.8 (07 Oct 2023 09:13), Max: 36.8 (07 Oct 2023 09:13)  T(F): 98.2 (07 Oct 2023 09:13), Max: 98.2 (07 Oct 2023 09:13)  HR: 64 (07 Oct 2023 09:13) (57 - 67)  BP: 181/76 (07 Oct 2023 09:13) (158/71 - 197/87)  BP(mean): --  RR: 20 (07 Oct 2023 09:13) (17 - 20)  SpO2: 100% (07 Oct 2023 09:13) (97% - 100%)    Parameters below as of 07 Oct 2023 09:13  Patient On (Oxygen Delivery Method): room air      Daily Height in cm: 104.14 (06 Oct 2023 16:55)    Daily Weight in k.9 (07 Oct 2023 00:10)  I&O's Summary    CAPILLARY BLOOD GLUCOSE      POCT Blood Glucose.: 73 mg/dL (06 Oct 2023 17:06)    PHYSICAL EXAM:  GENERAL: NAD,    HEAD:  Atraumatic, Normocephalic  EYES: EOMI, PERRLA, conjunctiva and sclera clear  ENMT: No tonsillar erythema, exudates, or enlargement; Moist mucous membranes, Good dentition, No lesions  NECK: Supple, No JVD, Normal thyroid  NERVOUS SYSTEM:  Alert & Oriented X3, Good concentration; Motor Strength 5/5 B/L upper and lower extremities; DTRs 2+ intact and symmetric  CHEST/LUNG: Clear to percussion bilaterally; No rales, rhonchi, wheezing, or rubs  HEART: Regular rate and rhythm; No murmurs, rubs, or gallops  ABDOMEN: Soft, Nontender, Nondistended; Bowel sounds present  EXTREMITIES:  2+ Peripheral Pulses, No clubbing, cyanosis, or edema  LYMPH: No lymphadenopathy noted  SKIN: No rashes or lesions    Labs                          12.7   5.27  )-----------( 142      ( 06 Oct 2023 17:30 )             39.4     10-07    131<L>  |  93<L>  |  73<H>  ----------------------------<  76  6.3<HH>   |  34<H>  |  5.36<H>    Ca    9.0      07 Oct 2023 09:30    TPro  7.5  /  Alb  2.7<L>  /  TBili  0.4  /  DBili  x   /  AST  27  /  ALT  21  /  AlkPhos  112  10-06    PT/INR - ( 06 Oct 2023 17:30 )   PT: 10.1 sec;   INR: 0.84 ratio         PTT - ( 06 Oct 2023 17:30 )  PTT:29.3 sec      Urinalysis Basic - ( 07 Oct 2023 09:30 )    Color: x / Appearance: x / SG: x / pH: x  Gluc: 76 mg/dL / Ketone: x  / Bili: x / Urobili: x   Blood: x / Protein: x / Nitrite: x   Leuk Esterase: x / RBC: x / WBC x   Sq Epi: x / Non Sq Epi: x / Bacteria: x                  DVT prophylaxis: > Lovenox 40mg SQ daily  > Heparin   > SCD's
[facility]   MD MARIBELL CARRION MD RUORU WONG, PA    TEL:  OFFICE: [phone]      RENAL FOLLOW UP NOTE-- Date of Service ;; 10-07-23 @ 11:23  --------------------------------------------------------------------------------  HPI:  Pt seen and examined at bedside  Denies SOB, chest pain.         PAST HISTORY  --------------------------------------------------------------------------------  No significant changes to PMH, PSH, FHx, SHx, unless otherwise noted    ALLERGIES & MEDICATIONS  --------------------------------------------------------------------------------  Allergies    No Known Allergies    Intolerances      Standing Inpatient Medications  aspirin enteric coated 81 milliGRAM(s) Oral daily  atorvastatin 80 milliGRAM(s) Oral at bedtime  heparin   Injectable 5000 Unit(s) SubCutaneous every 12 hours    PRN Inpatient Medications      REVIEW OF SYSTEMS  --------------------------------------------------------------------------------  General: no fever  CVS: no chest pain  RESP: no sob, no cough  ABD: no abdominal pain  : no dysuria,  MSK: no edema     VITALS/PHYSICAL EXAM  --------------------------------------------------------------------------------  T(C): 36.8 (10-07-23 @ 09:13), Max: 36.8 (10-07-23 @ 09:13)  HR: 64 (10-07-23 @ 09:13) (57 - 67)  BP: 181/76 (10-07-23 @ 09:13) (158/71 - 197/87)  RR: 20 (10-07-23 @ 09:13) (17 - 20)  SpO2: 100% (10-07-23 @ 09:13) (97% - 100%)  Wt(kg): --  Height (cm): 104.1 (10-06-23 @ 16:55)  Weight (kg): 51.3 (10-06-23 @ 16:55)  BMI (kg/m2): 47.3 (10-06-23 @ 16:55)  BSA (m2): 1.11 (10-06-23 @ 16:55)      Physical Exam:  	Gen: NAD  	HEENT: MMM  	Pulm: CTA B/L  	CV: S1S2  	Abd: Soft, +BS  	Ext: No LE edema , LAKA, RBKA                      Neuro: Awake , alert  	Skin: Warm and Dry   	Vascular access: [facility]             LABS/STUDIES  --------------------------------------------------------------------------------              12.7   5.27  >-----------<  142      [10-06-23 @ 17:30]              39.4     131  |  93  |  73  ----------------------------<  76      [10-07-23 @ 09:30]  6.3   |  34  |  5.36        Ca     9.0     [10-07-23 @ 09:30]    TPro  7.5  /  Alb  2.7  /  TBili  0.4  /  DBili  x   /  AST  27  /  ALT  21  /  AlkPhos  112  [10-06-23 @ 17:30]    PT/INR: PT 10.1 , INR 0.84       [10-06-23 @ 17:30]  PTT: 29.3       [10-06-23 @ 17:30]      Creatinine Trend:  SCr 5.36 [10-07 @ 09:30]  SCr 4.56 [10-06 @ 17:30]    Urinalysis - [10-07-23 @ 09:30]      Color  / Appearance  / SG  / pH       Gluc 76 / Ketone   / Bili  / Urobili        Blood  / Protein  / Leuk Est  / Nitrite       RBC  / WBC  / Hyaline  / Gran  / Sq Epi  / Non Sq Epi  / Bacteria       HbA1c 4.2      [07-19-19 @ 09:56]  TSH 1.790      [06-23-23 @ 11:51]      
Patient is a 68y old  Male who presents with a chief complaint of r/o cva (09 Oct 2023 22:37)    INTERVAL HPI/OVERNIGHT EVENTS: nose bleed overnight resolved     MEDICATIONS  (STANDING):  aspirin enteric coated 81 milliGRAM(s) Oral daily  atorvastatin 80 milliGRAM(s) Oral at bedtime  heparin   Injectable 5000 Unit(s) SubCutaneous every 12 hours  hydrALAZINE 50 milliGRAM(s) Oral three times a day  labetalol 200 milliGRAM(s) Oral every 12 hours  oxymetazoline 0.05% Nasal Spray 1 Spray(s) Both Nostrils two times a day    MEDICATIONS  (PRN):  ondansetron    Tablet 4 milliGRAM(s) Oral every 8 hours PRN Nausea and/or Vomiting    Allergies    No Known Allergies    Intolerances      REVIEW OF SYSTEMS:  All other systems reviewed and are negative    Vital Signs Last 24 Hrs  T(C): 36.7 (10 Oct 2023 10:59), Max: 36.8 (10 Oct 2023 09:08)  T(F): 98 (10 Oct 2023 10:59), Max: 98.2 (10 Oct 2023 09:08)  HR: 63 (10 Oct 2023 12:57) (61 - 73)  BP: 169/68 (10 Oct 2023 12:57) (159/57 - 177/72)  BP(mean): --  RR: 18 (10 Oct 2023 10:59) (18 - 18)  SpO2: 97% (10 Oct 2023 10:59) (96% - 99%)    Parameters below as of 10 Oct 2023 04:49  Patient On (Oxygen Delivery Method): room air      Daily     Daily Weight in k.1 (10 Oct 2023 04:49)  I&O's Summary    09 Oct 2023 07:01  -  10 Oct 2023 07:00  --------------------------------------------------------  IN: 0 mL / OUT: 500 mL / NET: -500 mL      CAPILLARY BLOOD GLUCOSE      POCT Blood Glucose.: 95 mg/dL (10 Oct 2023 07:22)  POCT Blood Glucose.: 83 mg/dL (09 Oct 2023 22:38)  POCT Blood Glucose.: 65 mg/dL (09 Oct 2023 22:12)    PHYSICAL EXAM:  GENERAL: NAD,    HEAD:  Atraumatic, Normocephalic  EYES: EOMI, PERRLA, conjunctiva and sclera clear  ENMT: No tonsillar erythema, exudates, or enlargement; Moist mucous membranes, Good dentition, No lesions  NECK: Supple, No JVD, Normal thyroid  NERVOUS SYSTEM:  Alert & Oriented X2, Good concentration; Motor Strength 5/5 B/L upper   CHEST/LUNG: Clear to percussion bilaterally; No rales, rhonchi, wheezing, or rubs  HEART: Regular rate and rhythm; No murmurs, rubs, or gallops  ABDOMEN: Soft, Nontender, Nondistended; Bowel sounds present  LYMPH: No lymphadenopathy noted  SKIN: No rashes or lesions    Labs                          11.7   4.98  )-----------( 141      ( 10 Oct 2023 03:50 )             37.2           PT/INR - ( 10 Oct 2023 03:50 )   PT: 10.5 sec;   INR: 0.87 ratio         PTT - ( 10 Oct 2023 03:50 )  PTT:34.7 sec                    DVT prophylaxis: > Lovenox 40mg SQ daily  > Heparin   > SCD's
JAMES PATRICK  68y  Patient is a 68y old  Male who presents with a chief complaint of r/o cva (10 Oct 2023 13:19)    HPI:  Admitted for possible CVA.  ESRD on HD, last HD was yesterday.    HEALTH ISSUES - PROBLEM Dx:        MEDICATIONS  (STANDING):  aspirin enteric coated 81 milliGRAM(s) Oral daily  atorvastatin 80 milliGRAM(s) Oral at bedtime  heparin   Injectable 5000 Unit(s) SubCutaneous every 12 hours  hydrALAZINE 50 milliGRAM(s) Oral three times a day  labetalol 200 milliGRAM(s) Oral every 12 hours  oxymetazoline 0.05% Nasal Spray 1 Spray(s) Both Nostrils two times a day    MEDICATIONS  (PRN):  ondansetron    Tablet 4 milliGRAM(s) Oral every 8 hours PRN Nausea and/or Vomiting    Vital Signs Last 24 Hrs  T(C): 36.8 (10 Oct 2023 16:22), Max: 36.8 (10 Oct 2023 09:08)  T(F): 98.3 (10 Oct 2023 16:22), Max: 98.3 (10 Oct 2023 16:22)  HR: 65 (10 Oct 2023 16:22) (61 - 68)  BP: 174/72 (10 Oct 2023 16:22) (159/57 - 177/72)  BP(mean): --  RR: 17 (10 Oct 2023 16:22) (17 - 18)  SpO2: 97% (10 Oct 2023 16:22) (96% - 99%)    Parameters below as of 10 Oct 2023 04:49  Patient On (Oxygen Delivery Method): room air      Daily     Daily Weight in k.1 (10 Oct 2023 04:49)    PHYSICAL EXAM:  Constitutional:  He appears comfortable and not distressed. Not diaphoretic.    Neck:  The thyroid is normal. Trachea is midline.     Respiratory: The lungs are clear to auscultation. No dullness and expansion is normal.    Cardiovascular: S1 and S2 are normal. No mummurs, rubs or gallops are present.    Gastrointestinal: The abdomen is soft. No tenderness is present. No masses are present. Bowel sounds are normal.    Genitourinary: The bladder is not distended. No CVA tenderness is present.    Extremities: No edema is noted. No deformities are present.    Neurological: Confused.    Skin: No leasions are seen  or palpated.    Lymph Nodes: No lymphadenopathy is present.                              11.7   4.98  )-----------( 141      ( 10 Oct 2023 03:50 )             37.2     10-10    130<L>  |  95<L>  |  39<H>  ----------------------------<  139<H>  5.4<H>   |  32<H>  |  4.70<H>    Ca    8.8      10 Oct 2023 16:06

## 2023-10-11 NOTE — DISCHARGE NOTE NURSING/CASE MANAGEMENT/SOCIAL WORK - PATIENT PORTAL LINK FT
You can access the FollowMyHealth Patient Portal offered by [facility] by registering at the following website: [email or web address]. By joining [employer]’s FollowMyHealth portal, you will also be able to view your health information using other applications (apps) compatible with our system.

## 2023-10-11 NOTE — PROGRESS NOTE ADULT - ASSESSMENT
ESRD:  On HD M[facility] at  [facility].  - HD on MWF.  - Renal Diet  - Monitor BMP    HTN  BP was low last HD and is labile.  On Hydralazine and Labetalol.  - Monitor BP.   - Consider Carvedilol and ARB instead if lability persists  - 1.5 L UF today.      Hyperkalemia  - Monitor BMP.  - 2 K bath at HD.      Anemia:  Hg is at goal  - TAINA if Hg < 11.  - monitor CBC.    Hyponatremia:  Volume expanded.  Improved now.

## 2023-10-11 NOTE — CHART NOTE - NSCHARTNOTEFT_GEN_A_CORE
Medicine PA Note    Notified by daytime RN that patient was actively vomiting the oral contrast . Patient was scheduled for CT abd/pelvis for nausea and vomiting. Spoke with Dr Simmons who said to order CT ABd/pelvis without oral contrast . PO distegrating Zofran given.    [facility]

## 2023-10-11 NOTE — PROGRESS NOTE ADULT - NUTRITIONAL ASSESSMENT
This patient has been assessed with a concern for Malnutrition and has been determined to have a diagnosis/diagnoses of Underweight (BMI < 19).    This patient is being managed with:   Diet Pureed-  For patients receiving Renal Replacement - No Protein Restr No Conc K No Conc Phos Low Sodium (RENAL)  Entered: Oct  6 2023  7:00PM  
This patient has been assessed with a concern for Malnutrition and has been determined to have a diagnosis/diagnoses of Underweight (BMI < 19).    This patient is being managed with:   Diet Pureed-  For patients receiving Renal Replacement - No Protein Restr No Conc K No Conc Phos Low Sodium (RENAL)  Entered: Oct  6 2023  7:00PM

## 2023-10-11 NOTE — DISCHARGE NOTE NURSING/CASE MANAGEMENT/SOCIAL WORK - NSDCVIVACCINE_GEN_ALL_CORE_FT
Tdap; 15-Aug-2017 22:58; Shalom Stanley); [employer]; [id number]; IntraMuscular; Deltoid Left.; 0.5 milliLiter(s); VIS (VIS Published: 09-May-2013, VIS Presented: 15-Aug-2017);

## 2023-10-11 NOTE — PROGRESS NOTE ADULT - ASSESSMENT
68M pmhx of ESRD on HD (LUE AVF, MWF), functional quadriplegia (R sided BKA + L sided AKA,), blindness, CAD, HTN, HLD, on 2L home O2 presents as a possible stroke from HD today. was 20minutes in when he became hypotensive. they stopped and gave fluids. he normalized. they did more HD and then he became confused. EMS was called and pt was picked up. no one was certain about his baseline.  Pt seen at bedside with no acute complaints, unsure of what happened Aox1, remember he was at dialysis but unable to give any pertinent history.      AMS likely acute metabolic encephalopathy  cva ruled out   -asa/statin     Nause Vomiting  resolved, ct scan normal        ESRD ON HD  -MWF,    -nephro      Hyperkalemia  -s/p lokelma    -improved    Hyponatremia  -around baseline now     HTN  -home meds     Functional Quadriplegia  -supportive care no pt needs

## 2023-10-11 NOTE — PROGRESS NOTE ADULT - PROVIDER SPECIALTY LIST ADULT
Neurology
Neurology
Hospitalist
Nephrology
Nephrology
Neurology
Nephrology
Hospitalist
Nephrology

## 2023-10-17 DIAGNOSIS — Z99.2 DEPENDENCE ON RENAL DIALYSIS: ICD-10-CM

## 2023-10-17 DIAGNOSIS — R63.6 UNDERWEIGHT: ICD-10-CM

## 2023-10-17 DIAGNOSIS — Z89.511 ACQUIRED ABSENCE OF RIGHT LEG BELOW KNEE: ICD-10-CM

## 2023-10-17 DIAGNOSIS — D64.9 ANEMIA, UNSPECIFIED: ICD-10-CM

## 2023-10-17 DIAGNOSIS — R41.82 ALTERED MENTAL STATUS, UNSPECIFIED: ICD-10-CM

## 2023-10-17 DIAGNOSIS — Z89.612 ACQUIRED ABSENCE OF LEFT LEG ABOVE KNEE: ICD-10-CM

## 2023-10-17 DIAGNOSIS — R11.2 NAUSEA WITH VOMITING, UNSPECIFIED: ICD-10-CM

## 2023-10-17 DIAGNOSIS — Z79.82 LONG TERM (CURRENT) USE OF ASPIRIN: ICD-10-CM

## 2023-10-17 DIAGNOSIS — R04.0 EPISTAXIS: ICD-10-CM

## 2023-10-17 DIAGNOSIS — I25.10 ATHEROSCLEROTIC HEART DISEASE OF NATIVE CORONARY ARTERY WITHOUT ANGINA PECTORIS: ICD-10-CM

## 2023-10-17 DIAGNOSIS — R53.2 FUNCTIONAL QUADRIPLEGIA: ICD-10-CM

## 2023-10-17 DIAGNOSIS — I12.0 HYPERTENSIVE CHRONIC KIDNEY DISEASE WITH STAGE 5 CHRONIC KIDNEY DISEASE OR END STAGE RENAL DISEASE: ICD-10-CM

## 2023-10-17 DIAGNOSIS — E11.51 TYPE 2 DIABETES MELLITUS WITH DIABETIC PERIPHERAL ANGIOPATHY WITHOUT GANGRENE: ICD-10-CM

## 2023-10-17 DIAGNOSIS — E11.22 TYPE 2 DIABETES MELLITUS WITH DIABETIC CHRONIC KIDNEY DISEASE: ICD-10-CM

## 2023-10-17 DIAGNOSIS — Z99.81 DEPENDENCE ON SUPPLEMENTAL OXYGEN: ICD-10-CM

## 2023-10-17 DIAGNOSIS — E87.5 HYPERKALEMIA: ICD-10-CM

## 2023-10-17 DIAGNOSIS — N18.6 END STAGE RENAL DISEASE: ICD-10-CM

## 2023-10-17 DIAGNOSIS — H54.7 UNSPECIFIED VISUAL LOSS: ICD-10-CM

## 2023-10-17 DIAGNOSIS — G93.41 METABOLIC ENCEPHALOPATHY: ICD-10-CM

## 2023-10-17 DIAGNOSIS — E87.1 HYPO-OSMOLALITY AND HYPONATREMIA: ICD-10-CM

## 2023-10-30 NOTE — CONSULT NOTE ADULT - SUBJECTIVE AND OBJECTIVE BOX
The one thing that is more important to me and worth living for would be my zia in [place]. HPI: 68M with a PMH of ESRD on HD MWF, functional quadriplegia (R sided BKA + L sided AKA,) blindness, CAD, HTN, HLD, CVA in past, on 2L home O2 who was admitted for AMS.     Consult: RRT was called after the patient became hypotensive w/ SBP in 80s and HR in 40s. Pt was clammy and vomited x 1. Prior to the RRT he had received his scheduled Nifedpaine at 0600, as his SBP usually runs in 130s-140s. EKG showed a slow afib/junctional rhythm. STAT labs sent, 500 ml bolus given & placed on pacer pads. Transferred to ICU for further management of care.     PAST MEDICAL & SURGICAL HISTORY:  DM (diabetes mellitus)  HTN (hypertension)  Below knee amputation status, right  AKA, left   CKD (chronic kidney disease)  MRSA (methicillin resistant Staphylococcus aureus) colonization  Wound (chronic wounds to left foot and leg)  H/O peripheral artery bypass- left fem to below-knee pop with RSVG      Allergies: No Known Allergies    ROS: Unable to Assess Given Mentation (AMS)    MEDICATIONS  (STANDING):  aspirin enteric coated 81 milliGRAM(s) Oral daily  atorvastatin 40 milliGRAM(s) Oral at bedtime  calcium acetate 667 milliGRAM(s) Oral three times a day with meals  chlorhexidine 2% Cloths 1 Application(s) Topical daily  heparin   Injectable 5000 Unit(s) SubCutaneous every 8 hours  lactated ringers. 500 milliLiter(s) (75 mL/Hr) IV Continuous <Continuous>  norepinephrine Infusion 0.05 MICROgram(s)/kG/Min (4.26 mL/Hr) IV Continuous <Continuous>  pantoprazole    Tablet 40 milliGRAM(s) Oral before breakfast  piperacillin/tazobactam IVPB.. 3.375 Gram(s) IV Intermittent every 12 hours    Vital Signs Last 24 Hrs  T(C): 36.2 (23 Jun 2023 07:55), Max: 37.3 (22 Jun 2023 23:50)  T(F): 97.2 (23 Jun 2023 07:55), Max: 99.1 (22 Jun 2023 23:50)  HR: 45 (23 Jun 2023 07:56) (45 - 67)  BP: 89/48 (23 Jun 2023 07:56) (84/38 - 155/77)  BP(mean): --  RR: 17 (23 Jun 2023 07:56) (16 - 18)  SpO2: 96% (23 Jun 2023 07:56) (92% - 100%)    Parameters below as of 23 Jun 2023 07:56  Patient On (Oxygen Delivery Method): nasal cannula  O2 Flow (L/min): 2      LABS:                          8.8    5.56  )-----------( 159      ( 22 Jun 2023 07:15 )             28.2     06-22    136  |  98  |  49<H>  ----------------------------<  94  4.4   |  34<H>  |  5.02<H>    Ca    8.6      22 Jun 2023 07:15    TPro  7.3  /  Alb  2.7<L>  /  TBili  0.3  /  DBili  x   /  AST  25  /  ALT  33  /  AlkPhos  90  06-21    LIVER FUNCTIONS - ( 21 Jun 2023 16:13 )  Alb: 2.7 g/dL / Pro: 7.3 gm/dL / ALK PHOS: 90 U/L / ALT: 33 U/L / AST: 25 U/L / GGT: x         thy  PT/INR - ( 21 Jun 2023 16:13 )   PT: 13.3 sec;   INR: 1.11 ratio         PTT - ( 21 Jun 2023 16:13 )  PTT:29.4 sec  Urinalysis Basic - ( 22 Jun 2023 07:15 )    Color: x / Appearance: x / SG: x / pH: x  Gluc: 94 mg/dL / Ketone: x  / Bili: x / Urobili: x   Blood: x / Protein: x / Nitrite: x   Leuk Esterase: x / RBC: x / WBC x   Sq Epi: x / Non Sq Epi: x / Bacteria: x    < from: CT Head No Cont (06.21.23 @ 18:46) >  IMPRESSION:  1. Old bilateral posterior circulation ischemic events, stable in   appearance compared with the prior.  2. Atherosclerotic changes.  3. No acute intracranial process. Specifically, no acute intracranial   hemorrhage. No large arterial distribution acute infarct. If altered   mental status persists, consider further evaluation via MR imaging to   include DWI and ADC mapping techniques, provided there are no   contraindications.    --- End of Report ---    < end of copied text >    < from: Xray Chest 1 View-PORTABLE IMMEDIATE (Xray Chest 1 View-PORTABLE IMMEDIATE .) (06.21.23 @ 18:16) >  INTERPRETATION:  AP supine chest on June 21, 2023 at 5:21 PM. Patient has   altered mental status.    Heart is enlarged. There is a loop recorder which is new since May 13,   2020.    There is a fairly large loculated left effusion which is new since the   earlier study.    IMPRESSION: Reported presently seen.    Fairly large loculated left effusion at this time.    --- End ofReport ---      < end of copied text >      PE:   General: Diaphoretic. Cool & Clammy. On pacer pads   NEURO: lethargic, arouses to voice & stimulation.    HEENT: Pupils equal and symmetrically reactive to light.  PULM: Diminished to auscultation bilaterally.  CVS: irregular rate and rhythm- slow afib vs junctional.   ABD: Soft, nondistended, no masses.  EXT: No edema. b/l amputations of lower extremities. + RUE fistula   SKIN: Cool & clammy, no rashes.         HPI: 68M with a PMH of ESRD on HD MWF, functional quadriplegia (R sided BKA + L sided AKA,) blindness, CAD, HTN, HLD, CVA in past, on 2L home O2 who was admitted for AMS.     Consult: RRT was called after the patient became hypotensive w/ SBP in 80s and HR in 40s. Pt was clammy and vomited x 1. Prior to the RRT he had received his scheduled Nifedpaine at 0600, as his SBP usually runs in 130s-140s. EKG showed a slow afib vs junctional rhythm. STAT labs sent, 500 ml bolus given & placed on pacer pads. Started on levo gtt & transferred to ICU for further management of care.     PAST MEDICAL & SURGICAL HISTORY:  DM (diabetes mellitus)  HTN (hypertension)  Below knee amputation status, right  AKA, left   CKD (chronic kidney disease)  MRSA (methicillin resistant Staphylococcus aureus) colonization  Wound (chronic wounds to left foot and leg)  H/O peripheral artery bypass- left fem to below-knee pop with RSVG      Allergies: No Known Allergies    ROS: Unable to Assess Given Mentation (AMS)    MEDICATIONS  (STANDING):  aspirin enteric coated 81 milliGRAM(s) Oral daily  atorvastatin 40 milliGRAM(s) Oral at bedtime  calcium acetate 667 milliGRAM(s) Oral three times a day with meals  chlorhexidine 2% Cloths 1 Application(s) Topical daily  heparin   Injectable 5000 Unit(s) SubCutaneous every 8 hours  lactated ringers. 500 milliLiter(s) (75 mL/Hr) IV Continuous <Continuous>  norepinephrine Infusion 0.05 MICROgram(s)/kG/Min (4.26 mL/Hr) IV Continuous <Continuous>  pantoprazole    Tablet 40 milliGRAM(s) Oral before breakfast  piperacillin/tazobactam IVPB.. 3.375 Gram(s) IV Intermittent every 12 hours    Vital Signs Last 24 Hrs  T(C): 36.2 (23 Jun 2023 07:55), Max: 37.3 (22 Jun 2023 23:50)  T(F): 97.2 (23 Jun 2023 07:55), Max: 99.1 (22 Jun 2023 23:50)  HR: 45 (23 Jun 2023 07:56) (45 - 67)  BP: 89/48 (23 Jun 2023 07:56) (84/38 - 155/77)  BP(mean): --  RR: 17 (23 Jun 2023 07:56) (16 - 18)  SpO2: 96% (23 Jun 2023 07:56) (92% - 100%)    Parameters below as of 23 Jun 2023 07:56  Patient On (Oxygen Delivery Method): nasal cannula  O2 Flow (L/min): 2      LABS:                          8.8    5.56  )-----------( 159      ( 22 Jun 2023 07:15 )             28.2     06-22    136  |  98  |  49<H>  ----------------------------<  94  4.4   |  34<H>  |  5.02<H>    Ca    8.6      22 Jun 2023 07:15    TPro  7.3  /  Alb  2.7<L>  /  TBili  0.3  /  DBili  x   /  AST  25  /  ALT  33  /  AlkPhos  90  06-21    LIVER FUNCTIONS - ( 21 Jun 2023 16:13 )  Alb: 2.7 g/dL / Pro: 7.3 gm/dL / ALK PHOS: 90 U/L / ALT: 33 U/L / AST: 25 U/L / GGT: x         thy  PT/INR - ( 21 Jun 2023 16:13 )   PT: 13.3 sec;   INR: 1.11 ratio         PTT - ( 21 Jun 2023 16:13 )  PTT:29.4 sec  Urinalysis Basic - ( 22 Jun 2023 07:15 )    Color: x / Appearance: x / SG: x / pH: x  Gluc: 94 mg/dL / Ketone: x  / Bili: x / Urobili: x   Blood: x / Protein: x / Nitrite: x   Leuk Esterase: x / RBC: x / WBC x   Sq Epi: x / Non Sq Epi: x / Bacteria: x    < from: CT Head No Cont (06.21.23 @ 18:46) >  IMPRESSION:  1. Old bilateral posterior circulation ischemic events, stable in   appearance compared with the prior.  2. Atherosclerotic changes.  3. No acute intracranial process. Specifically, no acute intracranial   hemorrhage. No large arterial distribution acute infarct. If altered   mental status persists, consider further evaluation via MR imaging to   include DWI and ADC mapping techniques, provided there are no   contraindications.    --- End of Report ---    < end of copied text >    < from: Xray Chest 1 View-PORTABLE IMMEDIATE (Xray Chest 1 View-PORTABLE IMMEDIATE .) (06.21.23 @ 18:16) >  INTERPRETATION:  AP supine chest on June 21, 2023 at 5:21 PM. Patient has   altered mental status.    Heart is enlarged. There is a loop recorder which is new since May 13,   2020.    There is a fairly large loculated left effusion which is new since the   earlier study.    IMPRESSION: Reported presently seen.    Fairly large loculated left effusion at this time.    --- End ofReport ---      < end of copied text >      PE:   General: Diaphoretic. Cool & Clammy. On pacer pads   NEURO: lethargic, arouses to voice & stimulation.    HEENT: Pupils equal and symmetrically reactive to light.  PULM: Diminished to auscultation bilaterally.  CVS: irregular rate and rhythm- slow afib vs junctional.   ABD: Soft, nondistended, no masses.  EXT: No edema. b/l amputations of lower extremities. + RUE fistula   SKIN: Cool & clammy, no rashes.

## 2023-11-06 ENCOUNTER — APPOINTMENT (OUTPATIENT)
Dept: ELECTROPHYSIOLOGY | Facility: CLINIC | Age: 68
End: 2023-11-06
Payer: MEDICAID

## 2023-11-06 ENCOUNTER — NON-APPOINTMENT (OUTPATIENT)
Age: 68
End: 2023-11-06

## 2023-11-07 ENCOUNTER — APPOINTMENT (OUTPATIENT)
Dept: PULMONOLOGY | Facility: CLINIC | Age: 68
End: 2023-11-07

## 2023-11-07 PROCEDURE — 93298 REM INTERROG DEV EVAL SCRMS: CPT

## 2023-11-07 PROCEDURE — G2066: CPT

## 2023-12-11 ENCOUNTER — NON-APPOINTMENT (OUTPATIENT)
Age: 68
End: 2023-12-11

## 2023-12-11 ENCOUNTER — APPOINTMENT (OUTPATIENT)
Dept: ELECTROPHYSIOLOGY | Facility: CLINIC | Age: 68
End: 2023-12-11
Payer: MEDICAID

## 2023-12-12 PROCEDURE — G2066: CPT | Mod: NC

## 2023-12-12 PROCEDURE — 93298 REM INTERROG DEV EVAL SCRMS: CPT

## 2024-01-01 ENCOUNTER — INPATIENT (INPATIENT)
Facility: HOSPITAL | Age: 69
LOS: 20 days | End: 2025-01-12
Attending: STUDENT IN AN ORGANIZED HEALTH CARE EDUCATION/TRAINING PROGRAM | Admitting: STUDENT IN AN ORGANIZED HEALTH CARE EDUCATION/TRAINING PROGRAM
Payer: MEDICAID

## 2024-01-01 VITALS
SYSTOLIC BLOOD PRESSURE: 136 MMHG | RESPIRATION RATE: 20 BRPM | OXYGEN SATURATION: 97 % | HEART RATE: 109 BPM | HEIGHT: 60 IN | DIASTOLIC BLOOD PRESSURE: 86 MMHG | TEMPERATURE: 98 F

## 2024-01-01 DIAGNOSIS — I63.9 CEREBRAL INFARCTION, UNSPECIFIED: ICD-10-CM

## 2024-01-01 DIAGNOSIS — E78.5 HYPERLIPIDEMIA, UNSPECIFIED: ICD-10-CM

## 2024-01-01 DIAGNOSIS — Z89.611 ACQUIRED ABSENCE OF RIGHT LEG ABOVE KNEE: Chronic | ICD-10-CM

## 2024-01-01 DIAGNOSIS — R53.2 FUNCTIONAL QUADRIPLEGIA: ICD-10-CM

## 2024-01-01 DIAGNOSIS — Z89.511 ACQUIRED ABSENCE OF RIGHT LEG BELOW KNEE: Chronic | ICD-10-CM

## 2024-01-01 DIAGNOSIS — G93.49 OTHER ENCEPHALOPATHY: ICD-10-CM

## 2024-01-01 DIAGNOSIS — Z51.5 ENCOUNTER FOR PALLIATIVE CARE: ICD-10-CM

## 2024-01-01 DIAGNOSIS — I73.9 PERIPHERAL VASCULAR DISEASE, UNSPECIFIED: ICD-10-CM

## 2024-01-01 DIAGNOSIS — R79.89 OTHER SPECIFIED ABNORMAL FINDINGS OF BLOOD CHEMISTRY: ICD-10-CM

## 2024-01-01 DIAGNOSIS — Z98.890 OTHER SPECIFIED POSTPROCEDURAL STATES: Chronic | ICD-10-CM

## 2024-01-01 DIAGNOSIS — N18.6 END STAGE RENAL DISEASE: ICD-10-CM

## 2024-01-01 DIAGNOSIS — K21.9 GASTRO-ESOPHAGEAL REFLUX DISEASE WITHOUT ESOPHAGITIS: ICD-10-CM

## 2024-01-01 DIAGNOSIS — J96.90 RESPIRATORY FAILURE, UNSPECIFIED, UNSPECIFIED WHETHER WITH HYPOXIA OR HYPERCAPNIA: ICD-10-CM

## 2024-01-01 DIAGNOSIS — I50.9 HEART FAILURE, UNSPECIFIED: ICD-10-CM

## 2024-01-01 DIAGNOSIS — K59.00 CONSTIPATION, UNSPECIFIED: ICD-10-CM

## 2024-01-01 DIAGNOSIS — J90 PLEURAL EFFUSION, NOT ELSEWHERE CLASSIFIED: ICD-10-CM

## 2024-01-01 DIAGNOSIS — Z71.89 OTHER SPECIFIED COUNSELING: ICD-10-CM

## 2024-01-01 DIAGNOSIS — Z89.612 ACQUIRED ABSENCE OF LEFT LEG ABOVE KNEE: Chronic | ICD-10-CM

## 2024-01-01 DIAGNOSIS — I10 ESSENTIAL (PRIMARY) HYPERTENSION: ICD-10-CM

## 2024-01-01 DIAGNOSIS — Z29.9 ENCOUNTER FOR PROPHYLACTIC MEASURES, UNSPECIFIED: ICD-10-CM

## 2024-01-01 DIAGNOSIS — R13.10 DYSPHAGIA, UNSPECIFIED: ICD-10-CM

## 2024-01-01 LAB
ADD ON TEST-SPECIMEN IN LAB: SIGNIFICANT CHANGE UP
ADD ON TEST-SPECIMEN IN LAB: SIGNIFICANT CHANGE UP
ALBUMIN FLD-MCNC: 0.6 G/DL — SIGNIFICANT CHANGE UP
ALBUMIN FLD-MCNC: 1.3 G/DL — SIGNIFICANT CHANGE UP
ALBUMIN SERPL ELPH-MCNC: 2.2 G/DL — LOW (ref 3.3–5)
ALBUMIN SERPL ELPH-MCNC: 2.2 G/DL — LOW (ref 3.3–5)
ALBUMIN SERPL ELPH-MCNC: 2.4 G/DL — LOW (ref 3.3–5)
ALBUMIN SERPL ELPH-MCNC: 2.5 G/DL — LOW (ref 3.3–5)
ALBUMIN SERPL ELPH-MCNC: 2.6 G/DL — LOW (ref 3.3–5)
ALBUMIN SERPL ELPH-MCNC: 2.6 G/DL — LOW (ref 3.3–5)
ALBUMIN SERPL ELPH-MCNC: 2.8 G/DL — LOW (ref 3.3–5)
ALBUMIN SERPL ELPH-MCNC: 2.9 G/DL — LOW (ref 3.3–5)
ALBUMIN SERPL ELPH-MCNC: 3 G/DL — LOW (ref 3.3–5)
ALP SERPL-CCNC: 101 U/L — SIGNIFICANT CHANGE UP (ref 40–120)
ALP SERPL-CCNC: 105 U/L — SIGNIFICANT CHANGE UP (ref 40–120)
ALP SERPL-CCNC: 106 U/L — SIGNIFICANT CHANGE UP (ref 40–120)
ALP SERPL-CCNC: 106 U/L — SIGNIFICANT CHANGE UP (ref 40–120)
ALP SERPL-CCNC: 107 U/L — SIGNIFICANT CHANGE UP (ref 40–120)
ALP SERPL-CCNC: 110 U/L — SIGNIFICANT CHANGE UP (ref 40–120)
ALP SERPL-CCNC: 116 U/L — SIGNIFICANT CHANGE UP (ref 40–120)
ALP SERPL-CCNC: 116 U/L — SIGNIFICANT CHANGE UP (ref 40–120)
ALP SERPL-CCNC: 117 U/L — SIGNIFICANT CHANGE UP (ref 40–120)
ALP SERPL-CCNC: 139 U/L — HIGH (ref 40–120)
ALP SERPL-CCNC: 139 U/L — HIGH (ref 40–120)
ALP SERPL-CCNC: 94 U/L — SIGNIFICANT CHANGE UP (ref 40–120)
ALT FLD-CCNC: 14 U/L — SIGNIFICANT CHANGE UP (ref 4–41)
ALT FLD-CCNC: 14 U/L — SIGNIFICANT CHANGE UP (ref 4–41)
ALT FLD-CCNC: 15 U/L — SIGNIFICANT CHANGE UP (ref 4–41)
ALT FLD-CCNC: 16 U/L — SIGNIFICANT CHANGE UP (ref 4–41)
ALT FLD-CCNC: 18 U/L — SIGNIFICANT CHANGE UP (ref 4–41)
ALT FLD-CCNC: 21 U/L — SIGNIFICANT CHANGE UP (ref 4–41)
ALT FLD-CCNC: 25 U/L — SIGNIFICANT CHANGE UP (ref 4–41)
ALT FLD-CCNC: 28 U/L — SIGNIFICANT CHANGE UP (ref 4–41)
ALT FLD-CCNC: 29 U/L — SIGNIFICANT CHANGE UP (ref 4–41)
ALT FLD-CCNC: 32 U/L — SIGNIFICANT CHANGE UP (ref 4–41)
ALT FLD-CCNC: 32 U/L — SIGNIFICANT CHANGE UP (ref 4–41)
ALT FLD-CCNC: 36 U/L — SIGNIFICANT CHANGE UP (ref 4–41)
ALT FLD-CCNC: 51 U/L — HIGH (ref 4–41)
ALT FLD-CCNC: 53 U/L — HIGH (ref 4–41)
AMMONIA BLD-MCNC: 28 UMOL/L — SIGNIFICANT CHANGE UP (ref 11–55)
ANION GAP SERPL CALC-SCNC: 13 MMOL/L — SIGNIFICANT CHANGE UP (ref 7–14)
ANION GAP SERPL CALC-SCNC: 15 MMOL/L — HIGH (ref 7–14)
ANION GAP SERPL CALC-SCNC: 16 MMOL/L — HIGH (ref 7–14)
ANION GAP SERPL CALC-SCNC: 16 MMOL/L — HIGH (ref 7–14)
ANION GAP SERPL CALC-SCNC: 17 MMOL/L — HIGH (ref 7–14)
ANION GAP SERPL CALC-SCNC: 18 MMOL/L — HIGH (ref 7–14)
ANION GAP SERPL CALC-SCNC: 18 MMOL/L — HIGH (ref 7–14)
ANION GAP SERPL CALC-SCNC: 19 MMOL/L — HIGH (ref 7–14)
ANION GAP SERPL CALC-SCNC: 19 MMOL/L — HIGH (ref 7–14)
ANION GAP SERPL CALC-SCNC: 20 MMOL/L — HIGH (ref 7–14)
ANION GAP SERPL CALC-SCNC: 23 MMOL/L — HIGH (ref 7–14)
ANION GAP SERPL CALC-SCNC: 26 MMOL/L — HIGH (ref 7–14)
APTT BLD: 28.6 SEC — SIGNIFICANT CHANGE UP (ref 24.5–35.6)
APTT BLD: 29.1 SEC — SIGNIFICANT CHANGE UP (ref 24.5–35.6)
APTT BLD: 29.7 SEC — SIGNIFICANT CHANGE UP (ref 24.5–35.6)
APTT BLD: 30.6 SEC — SIGNIFICANT CHANGE UP (ref 24.5–35.6)
APTT BLD: 32.3 SEC — SIGNIFICANT CHANGE UP (ref 24.5–35.6)
APTT BLD: 32.3 SEC — SIGNIFICANT CHANGE UP (ref 24.5–35.6)
APTT BLD: 33.2 SEC — SIGNIFICANT CHANGE UP (ref 24.5–35.6)
APTT BLD: 34 SEC — SIGNIFICANT CHANGE UP (ref 24.5–35.6)
APTT BLD: 36.6 SEC — HIGH (ref 24.5–35.6)
APTT BLD: 38.5 SEC — HIGH (ref 24.5–35.6)
APTT BLD: 45.7 SEC — HIGH (ref 24.5–35.6)
AST SERPL-CCNC: 115 U/L — HIGH (ref 4–40)
AST SERPL-CCNC: 121 U/L — HIGH (ref 4–40)
AST SERPL-CCNC: 40 U/L — SIGNIFICANT CHANGE UP (ref 4–40)
AST SERPL-CCNC: 41 U/L — HIGH (ref 4–40)
AST SERPL-CCNC: 44 U/L — HIGH (ref 4–40)
AST SERPL-CCNC: 48 U/L — HIGH (ref 4–40)
AST SERPL-CCNC: 51 U/L — HIGH (ref 4–40)
AST SERPL-CCNC: 51 U/L — HIGH (ref 4–40)
AST SERPL-CCNC: 54 U/L — HIGH (ref 4–40)
AST SERPL-CCNC: 60 U/L — HIGH (ref 4–40)
AST SERPL-CCNC: 60 U/L — HIGH (ref 4–40)
AST SERPL-CCNC: 66 U/L — HIGH (ref 4–40)
AST SERPL-CCNC: 66 U/L — HIGH (ref 4–40)
AST SERPL-CCNC: 81 U/L — HIGH (ref 4–40)
B PERT IGG+IGM PNL SER: ABNORMAL
BASOPHILS # BLD AUTO: 0 K/UL — SIGNIFICANT CHANGE UP (ref 0–0.2)
BASOPHILS # BLD AUTO: 0.01 K/UL — SIGNIFICANT CHANGE UP (ref 0–0.2)
BASOPHILS # BLD AUTO: 0.02 K/UL — SIGNIFICANT CHANGE UP (ref 0–0.2)
BASOPHILS # BLD AUTO: 0.03 K/UL — SIGNIFICANT CHANGE UP (ref 0–0.2)
BASOPHILS # BLD AUTO: 0.04 K/UL — SIGNIFICANT CHANGE UP (ref 0–0.2)
BASOPHILS # BLD AUTO: 0.04 K/UL — SIGNIFICANT CHANGE UP (ref 0–0.2)
BASOPHILS # BLD AUTO: 0.05 K/UL — SIGNIFICANT CHANGE UP (ref 0–0.2)
BASOPHILS %.: 0 % — SIGNIFICANT CHANGE UP
BASOPHILS NFR BLD AUTO: 0 % — SIGNIFICANT CHANGE UP (ref 0–2)
BASOPHILS NFR BLD AUTO: 0.1 % — SIGNIFICANT CHANGE UP (ref 0–2)
BASOPHILS NFR BLD AUTO: 0.1 % — SIGNIFICANT CHANGE UP (ref 0–2)
BASOPHILS NFR BLD AUTO: 0.2 % — SIGNIFICANT CHANGE UP (ref 0–2)
BASOPHILS NFR BLD AUTO: 0.3 % — SIGNIFICANT CHANGE UP (ref 0–2)
BASOPHILS NFR BLD AUTO: 0.4 % — SIGNIFICANT CHANGE UP (ref 0–2)
BASOPHILS NFR BLD AUTO: 0.5 % — SIGNIFICANT CHANGE UP (ref 0–2)
BASOPHILS NFR BLD AUTO: 0.6 % — SIGNIFICANT CHANGE UP (ref 0–2)
BASOPHILS NFR BLD AUTO: 1.1 % — SIGNIFICANT CHANGE UP (ref 0–2)
BILIRUB SERPL-MCNC: 0.4 MG/DL — SIGNIFICANT CHANGE UP (ref 0.2–1.2)
BILIRUB SERPL-MCNC: 0.5 MG/DL — SIGNIFICANT CHANGE UP (ref 0.2–1.2)
BILIRUB SERPL-MCNC: 0.6 MG/DL — SIGNIFICANT CHANGE UP (ref 0.2–1.2)
BILIRUB SERPL-MCNC: 0.6 MG/DL — SIGNIFICANT CHANGE UP (ref 0.2–1.2)
BLD GP AB SCN SERPL QL: NEGATIVE — SIGNIFICANT CHANGE UP
BLD GP AB SCN SERPL QL: NEGATIVE — SIGNIFICANT CHANGE UP
BLOOD GAS ARTERIAL - LYTES,HGB,ICA,LACT RESULT: SIGNIFICANT CHANGE UP
BLOOD GAS ARTERIAL COMPREHENSIVE RESULT: SIGNIFICANT CHANGE UP
BLOOD GAS VENOUS COMPREHENSIVE RESULT: SIGNIFICANT CHANGE UP
BUN SERPL-MCNC: 14 MG/DL — SIGNIFICANT CHANGE UP (ref 7–23)
BUN SERPL-MCNC: 15 MG/DL — SIGNIFICANT CHANGE UP (ref 7–23)
BUN SERPL-MCNC: 17 MG/DL — SIGNIFICANT CHANGE UP (ref 7–23)
BUN SERPL-MCNC: 20 MG/DL — SIGNIFICANT CHANGE UP (ref 7–23)
BUN SERPL-MCNC: 23 MG/DL — SIGNIFICANT CHANGE UP (ref 7–23)
BUN SERPL-MCNC: 24 MG/DL — HIGH (ref 7–23)
BUN SERPL-MCNC: 26 MG/DL — HIGH (ref 7–23)
BUN SERPL-MCNC: 27 MG/DL — HIGH (ref 7–23)
BUN SERPL-MCNC: 30 MG/DL — HIGH (ref 7–23)
BUN SERPL-MCNC: 30 MG/DL — HIGH (ref 7–23)
BUN SERPL-MCNC: 33 MG/DL — HIGH (ref 7–23)
BUN SERPL-MCNC: 35 MG/DL — HIGH (ref 7–23)
BUN SERPL-MCNC: 36 MG/DL — HIGH (ref 7–23)
BUN SERPL-MCNC: 37 MG/DL — HIGH (ref 7–23)
CA-I BLD-SCNC: 1.02 MMOL/L — LOW (ref 1.15–1.29)
CALCIUM SERPL-MCNC: 7.4 MG/DL — LOW (ref 8.4–10.5)
CALCIUM SERPL-MCNC: 7.5 MG/DL — LOW (ref 8.4–10.5)
CALCIUM SERPL-MCNC: 7.5 MG/DL — LOW (ref 8.4–10.5)
CALCIUM SERPL-MCNC: 7.8 MG/DL — LOW (ref 8.4–10.5)
CALCIUM SERPL-MCNC: 7.8 MG/DL — LOW (ref 8.4–10.5)
CALCIUM SERPL-MCNC: 7.9 MG/DL — LOW (ref 8.4–10.5)
CALCIUM SERPL-MCNC: 8.1 MG/DL — LOW (ref 8.4–10.5)
CALCIUM SERPL-MCNC: 8.2 MG/DL — LOW (ref 8.4–10.5)
CALCIUM SERPL-MCNC: 8.4 MG/DL — SIGNIFICANT CHANGE UP (ref 8.4–10.5)
CALCIUM SERPL-MCNC: 8.5 MG/DL — SIGNIFICANT CHANGE UP (ref 8.4–10.5)
CALCIUM SERPL-MCNC: 8.5 MG/DL — SIGNIFICANT CHANGE UP (ref 8.4–10.5)
CALCIUM SERPL-MCNC: 8.6 MG/DL — SIGNIFICANT CHANGE UP (ref 8.4–10.5)
CALCIUM SERPL-MCNC: 8.7 MG/DL — SIGNIFICANT CHANGE UP (ref 8.4–10.5)
CALCIUM SERPL-MCNC: 9 MG/DL — SIGNIFICANT CHANGE UP (ref 8.4–10.5)
CHLORIDE SERPL-SCNC: 100 MMOL/L — SIGNIFICANT CHANGE UP (ref 98–107)
CHLORIDE SERPL-SCNC: 100 MMOL/L — SIGNIFICANT CHANGE UP (ref 98–107)
CHLORIDE SERPL-SCNC: 91 MMOL/L — LOW (ref 98–107)
CHLORIDE SERPL-SCNC: 94 MMOL/L — LOW (ref 98–107)
CHLORIDE SERPL-SCNC: 95 MMOL/L — LOW (ref 98–107)
CHLORIDE SERPL-SCNC: 95 MMOL/L — LOW (ref 98–107)
CHLORIDE SERPL-SCNC: 96 MMOL/L — LOW (ref 98–107)
CHLORIDE SERPL-SCNC: 98 MMOL/L — SIGNIFICANT CHANGE UP (ref 98–107)
CK MB BLD-MCNC: 3.5 % — HIGH (ref 0–2.5)
CK MB BLD-MCNC: 3.6 % — HIGH (ref 0–2.5)
CK MB BLD-MCNC: 3.7 % — HIGH (ref 0–2.5)
CK MB BLD-MCNC: 3.9 % — HIGH (ref 0–2.5)
CK MB BLD-MCNC: 3.9 % — HIGH (ref 0–2.5)
CK MB CFR SERPL CALC: 11.2 NG/ML — HIGH
CK MB CFR SERPL CALC: 13.4 NG/ML — HIGH
CK MB CFR SERPL CALC: 7.3 NG/ML — HIGH
CK MB CFR SERPL CALC: 8.4 NG/ML — HIGH
CK MB CFR SERPL CALC: 9.9 NG/ML — HIGH
CK SERPL-CCNC: 206 U/L — HIGH (ref 30–200)
CK SERPL-CCNC: 226 U/L — HIGH (ref 30–200)
CK SERPL-CCNC: 253 U/L — HIGH (ref 30–200)
CK SERPL-CCNC: 311 U/L — HIGH (ref 30–200)
CK SERPL-CCNC: 345 U/L — HIGH (ref 30–200)
CO2 SERPL-SCNC: 17 MMOL/L — LOW (ref 22–31)
CO2 SERPL-SCNC: 17 MMOL/L — LOW (ref 22–31)
CO2 SERPL-SCNC: 19 MMOL/L — LOW (ref 22–31)
CO2 SERPL-SCNC: 19 MMOL/L — LOW (ref 22–31)
CO2 SERPL-SCNC: 20 MMOL/L — LOW (ref 22–31)
CO2 SERPL-SCNC: 21 MMOL/L — LOW (ref 22–31)
CO2 SERPL-SCNC: 23 MMOL/L — SIGNIFICANT CHANGE UP (ref 22–31)
CO2 SERPL-SCNC: 24 MMOL/L — SIGNIFICANT CHANGE UP (ref 22–31)
CO2 SERPL-SCNC: 26 MMOL/L — SIGNIFICANT CHANGE UP (ref 22–31)
COLOR FLD: ABNORMAL
COLOR FLD: ABNORMAL
COLOR FLD: YELLOW
CORTIS AM PEAK SERPL-MCNC: 41.5 UG/DL — HIGH (ref 6–18.4)
CREAT SERPL-MCNC: 2.51 MG/DL — HIGH (ref 0.5–1.3)
CREAT SERPL-MCNC: 2.85 MG/DL — HIGH (ref 0.5–1.3)
CREAT SERPL-MCNC: 2.92 MG/DL — HIGH (ref 0.5–1.3)
CREAT SERPL-MCNC: 2.98 MG/DL — HIGH (ref 0.5–1.3)
CREAT SERPL-MCNC: 3.05 MG/DL — HIGH (ref 0.5–1.3)
CREAT SERPL-MCNC: 3.42 MG/DL — HIGH (ref 0.5–1.3)
CREAT SERPL-MCNC: 3.45 MG/DL — HIGH (ref 0.5–1.3)
CREAT SERPL-MCNC: 3.64 MG/DL — HIGH (ref 0.5–1.3)
CREAT SERPL-MCNC: 3.99 MG/DL — HIGH (ref 0.5–1.3)
CREAT SERPL-MCNC: 4 MG/DL — HIGH (ref 0.5–1.3)
CREAT SERPL-MCNC: 4.3 MG/DL — HIGH (ref 0.5–1.3)
CREAT SERPL-MCNC: 4.45 MG/DL — HIGH (ref 0.5–1.3)
CREAT SERPL-MCNC: 4.59 MG/DL — HIGH (ref 0.5–1.3)
CREAT SERPL-MCNC: 5 MG/DL — HIGH (ref 0.5–1.3)
CULTURE RESULTS: ABNORMAL
CULTURE RESULTS: SIGNIFICANT CHANGE UP
EGFR: 12 ML/MIN/1.73M2 — LOW
EGFR: 12 ML/MIN/1.73M2 — LOW
EGFR: 13 ML/MIN/1.73M2 — LOW
EGFR: 13 ML/MIN/1.73M2 — LOW
EGFR: 14 ML/MIN/1.73M2 — LOW
EGFR: 15 ML/MIN/1.73M2 — LOW
EGFR: 17 ML/MIN/1.73M2 — LOW
EGFR: 17 ML/MIN/1.73M2 — LOW
EGFR: 18 ML/MIN/1.73M2 — LOW
EGFR: 18 ML/MIN/1.73M2 — LOW
EGFR: 19 ML/MIN/1.73M2 — LOW
EGFR: 19 ML/MIN/1.73M2 — LOW
EGFR: 21 ML/MIN/1.73M2 — LOW
EGFR: 21 ML/MIN/1.73M2 — LOW
EGFR: 22 ML/MIN/1.73M2 — LOW
EGFR: 23 ML/MIN/1.73M2 — LOW
EGFR: 23 ML/MIN/1.73M2 — LOW
EGFR: 27 ML/MIN/1.73M2 — LOW
EGFR: 27 ML/MIN/1.73M2 — LOW
EOSINOPHIL # BLD AUTO: 0 K/UL — SIGNIFICANT CHANGE UP (ref 0–0.5)
EOSINOPHIL # BLD AUTO: 0.01 K/UL — SIGNIFICANT CHANGE UP (ref 0–0.5)
EOSINOPHIL # BLD AUTO: 0.01 K/UL — SIGNIFICANT CHANGE UP (ref 0–0.5)
EOSINOPHIL # BLD AUTO: 0.04 K/UL — SIGNIFICANT CHANGE UP (ref 0–0.5)
EOSINOPHIL # BLD AUTO: 0.11 K/UL — SIGNIFICANT CHANGE UP (ref 0–0.5)
EOSINOPHIL # BLD AUTO: 0.16 K/UL — SIGNIFICANT CHANGE UP (ref 0–0.5)
EOSINOPHIL # BLD AUTO: 0.18 K/UL — SIGNIFICANT CHANGE UP (ref 0–0.5)
EOSINOPHIL # BLD AUTO: 0.25 K/UL — SIGNIFICANT CHANGE UP (ref 0–0.5)
EOSINOPHIL # BLD AUTO: 0.39 K/UL — SIGNIFICANT CHANGE UP (ref 0–0.5)
EOSINOPHIL # BLD AUTO: 0.49 K/UL — SIGNIFICANT CHANGE UP (ref 0–0.5)
EOSINOPHIL # FLD: 0 % — SIGNIFICANT CHANGE UP
EOSINOPHIL NFR BLD AUTO: 0 % — SIGNIFICANT CHANGE UP (ref 0–6)
EOSINOPHIL NFR BLD AUTO: 0.1 % — SIGNIFICANT CHANGE UP (ref 0–6)
EOSINOPHIL NFR BLD AUTO: 0.1 % — SIGNIFICANT CHANGE UP (ref 0–6)
EOSINOPHIL NFR BLD AUTO: 0.6 % — SIGNIFICANT CHANGE UP (ref 0–6)
EOSINOPHIL NFR BLD AUTO: 2.3 % — SIGNIFICANT CHANGE UP (ref 0–6)
EOSINOPHIL NFR BLD AUTO: 2.7 % — SIGNIFICANT CHANGE UP (ref 0–6)
EOSINOPHIL NFR BLD AUTO: 3.1 % — SIGNIFICANT CHANGE UP (ref 0–6)
EOSINOPHIL NFR BLD AUTO: 3.8 % — SIGNIFICANT CHANGE UP (ref 0–6)
EOSINOPHIL NFR BLD AUTO: 6.1 % — HIGH (ref 0–6)
EOSINOPHIL NFR BLD AUTO: 6.7 % — HIGH (ref 0–6)
FLUAV AG NPH QL: SIGNIFICANT CHANGE UP
FLUBV AG NPH QL: SIGNIFICANT CHANGE UP
FLUID INTAKE SUBSTANCE CLASS: SIGNIFICANT CHANGE UP
FLUID INTAKE SUBSTANCE CLASS: SIGNIFICANT CHANGE UP
FOLATE+VIT B12 SERBLD-IMP: 0 % — SIGNIFICANT CHANGE UP
GAS PNL BLDA: SIGNIFICANT CHANGE UP
GAS PNL BLDV: SIGNIFICANT CHANGE UP
GLUCOSE BLDC GLUCOMTR-MCNC: 122 MG/DL — HIGH (ref 70–99)
GLUCOSE BLDC GLUCOMTR-MCNC: 168 MG/DL — HIGH (ref 70–99)
GLUCOSE BLDC GLUCOMTR-MCNC: 183 MG/DL — HIGH (ref 70–99)
GLUCOSE BLDC GLUCOMTR-MCNC: 183 MG/DL — HIGH (ref 70–99)
GLUCOSE BLDC GLUCOMTR-MCNC: 191 MG/DL — HIGH (ref 70–99)
GLUCOSE BLDC GLUCOMTR-MCNC: 191 MG/DL — HIGH (ref 70–99)
GLUCOSE BLDC GLUCOMTR-MCNC: 198 MG/DL — HIGH (ref 70–99)
GLUCOSE BLDC GLUCOMTR-MCNC: 205 MG/DL — HIGH (ref 70–99)
GLUCOSE BLDC GLUCOMTR-MCNC: 211 MG/DL — HIGH (ref 70–99)
GLUCOSE BLDC GLUCOMTR-MCNC: 224 MG/DL — HIGH (ref 70–99)
GLUCOSE BLDC GLUCOMTR-MCNC: 241 MG/DL — HIGH (ref 70–99)
GLUCOSE BLDC GLUCOMTR-MCNC: 282 MG/DL — HIGH (ref 70–99)
GLUCOSE BLDC GLUCOMTR-MCNC: 286 MG/DL — HIGH (ref 70–99)
GLUCOSE BLDC GLUCOMTR-MCNC: 325 MG/DL — HIGH (ref 70–99)
GLUCOSE BLDC GLUCOMTR-MCNC: 327 MG/DL — HIGH (ref 70–99)
GLUCOSE BLDC GLUCOMTR-MCNC: 64 MG/DL — LOW (ref 70–99)
GLUCOSE BLDC GLUCOMTR-MCNC: 69 MG/DL — LOW (ref 70–99)
GLUCOSE BLDC GLUCOMTR-MCNC: 71 MG/DL — SIGNIFICANT CHANGE UP (ref 70–99)
GLUCOSE FLD-MCNC: 202 MG/DL — SIGNIFICANT CHANGE UP
GLUCOSE FLD-MCNC: 248 MG/DL — SIGNIFICANT CHANGE UP
GLUCOSE SERPL-MCNC: 116 MG/DL — HIGH (ref 70–99)
GLUCOSE SERPL-MCNC: 214 MG/DL — HIGH (ref 70–99)
GLUCOSE SERPL-MCNC: 215 MG/DL — HIGH (ref 70–99)
GLUCOSE SERPL-MCNC: 219 MG/DL — HIGH (ref 70–99)
GLUCOSE SERPL-MCNC: 222 MG/DL — HIGH (ref 70–99)
GLUCOSE SERPL-MCNC: 246 MG/DL — HIGH (ref 70–99)
GLUCOSE SERPL-MCNC: 255 MG/DL — HIGH (ref 70–99)
GLUCOSE SERPL-MCNC: 265 MG/DL — HIGH (ref 70–99)
GLUCOSE SERPL-MCNC: 291 MG/DL — HIGH (ref 70–99)
GLUCOSE SERPL-MCNC: 294 MG/DL — HIGH (ref 70–99)
GLUCOSE SERPL-MCNC: 295 MG/DL — HIGH (ref 70–99)
GLUCOSE SERPL-MCNC: 405 MG/DL — HIGH (ref 70–99)
GLUCOSE SERPL-MCNC: 65 MG/DL — LOW (ref 70–99)
GLUCOSE SERPL-MCNC: 87 MG/DL — SIGNIFICANT CHANGE UP (ref 70–99)
GRAM STN FLD: ABNORMAL
GRAM STN FLD: SIGNIFICANT CHANGE UP
HBV CORE AB SER-ACNC: SIGNIFICANT CHANGE UP
HBV CORE IGM SER-ACNC: SIGNIFICANT CHANGE UP
HBV SURFACE AB SER-ACNC: 11.4 MIU/ML — LOW
HBV SURFACE AG SER-ACNC: SIGNIFICANT CHANGE UP
HCT VFR BLD CALC: 27.3 % — LOW (ref 39–50)
HCT VFR BLD CALC: 27.7 % — LOW (ref 39–50)
HCT VFR BLD CALC: 28.4 % — LOW (ref 39–50)
HCT VFR BLD CALC: 28.5 % — LOW (ref 39–50)
HCT VFR BLD CALC: 30.2 % — LOW (ref 39–50)
HCT VFR BLD CALC: 30.2 % — LOW (ref 39–50)
HCT VFR BLD CALC: 30.3 % — LOW (ref 39–50)
HCT VFR BLD CALC: 31.9 % — LOW (ref 39–50)
HCT VFR BLD CALC: 33.7 % — LOW (ref 39–50)
HCT VFR BLD CALC: 34.8 % — LOW (ref 39–50)
HCT VFR BLD CALC: 35.7 % — LOW (ref 39–50)
HCT VFR BLD CALC: 40.1 % — SIGNIFICANT CHANGE UP (ref 39–50)
HCT VFR BLD CALC: 47.4 % — SIGNIFICANT CHANGE UP (ref 39–50)
HCV AB S/CO SERPL IA: 0.3 S/CO — SIGNIFICANT CHANGE UP (ref 0–0.99)
HCV AB SERPL-IMP: SIGNIFICANT CHANGE UP
HGB BLD-MCNC: 10 G/DL — LOW (ref 13–17)
HGB BLD-MCNC: 10.2 G/DL — LOW (ref 13–17)
HGB BLD-MCNC: 11 G/DL — LOW (ref 13–17)
HGB BLD-MCNC: 11 G/DL — LOW (ref 13–17)
HGB BLD-MCNC: 11.6 G/DL — LOW (ref 13–17)
HGB BLD-MCNC: 13.8 G/DL — SIGNIFICANT CHANGE UP (ref 13–17)
HGB BLD-MCNC: 8.5 G/DL — LOW (ref 13–17)
HGB BLD-MCNC: 8.8 G/DL — LOW (ref 13–17)
HGB BLD-MCNC: 8.9 G/DL — LOW (ref 13–17)
HGB BLD-MCNC: 9.1 G/DL — LOW (ref 13–17)
HGB BLD-MCNC: 9.2 G/DL — LOW (ref 13–17)
HGB BLD-MCNC: 9.2 G/DL — LOW (ref 13–17)
HGB BLD-MCNC: 9.4 G/DL — LOW (ref 13–17)
IANC: 10.79 K/UL — HIGH (ref 1.8–7.4)
IANC: 2.34 K/UL — SIGNIFICANT CHANGE UP (ref 1.8–7.4)
IANC: 3.49 K/UL — SIGNIFICANT CHANGE UP (ref 1.8–7.4)
IANC: 4.16 K/UL — SIGNIFICANT CHANGE UP (ref 1.8–7.4)
IANC: 4.69 K/UL — SIGNIFICANT CHANGE UP (ref 1.8–7.4)
IANC: 4.71 K/UL — SIGNIFICANT CHANGE UP (ref 1.8–7.4)
IANC: 5.06 K/UL — SIGNIFICANT CHANGE UP (ref 1.8–7.4)
IANC: 5.2 K/UL — SIGNIFICANT CHANGE UP (ref 1.8–7.4)
IANC: 5.55 K/UL — SIGNIFICANT CHANGE UP (ref 1.8–7.4)
IANC: 7.02 K/UL — SIGNIFICANT CHANGE UP (ref 1.8–7.4)
IANC: 8.42 K/UL — HIGH (ref 1.8–7.4)
IANC: 8.66 K/UL — HIGH (ref 1.8–7.4)
IANC: 9.29 K/UL — HIGH (ref 1.8–7.4)
IMM GRANULOCYTES NFR BLD AUTO: 0.2 % — SIGNIFICANT CHANGE UP (ref 0–0.9)
IMM GRANULOCYTES NFR BLD AUTO: 0.3 % — SIGNIFICANT CHANGE UP (ref 0–0.9)
IMM GRANULOCYTES NFR BLD AUTO: 0.3 % — SIGNIFICANT CHANGE UP (ref 0–0.9)
IMM GRANULOCYTES NFR BLD AUTO: 0.4 % — SIGNIFICANT CHANGE UP (ref 0–0.9)
IMM GRANULOCYTES NFR BLD AUTO: 0.5 % — SIGNIFICANT CHANGE UP (ref 0–0.9)
IMM GRANULOCYTES NFR BLD AUTO: 0.6 % — SIGNIFICANT CHANGE UP (ref 0–0.9)
IMM GRANULOCYTES NFR BLD AUTO: 0.7 % — SIGNIFICANT CHANGE UP (ref 0–0.9)
IMM GRANULOCYTES NFR BLD AUTO: 0.8 % — SIGNIFICANT CHANGE UP (ref 0–0.9)
INR BLD: 1 RATIO — SIGNIFICANT CHANGE UP (ref 0.85–1.16)
INR BLD: 1.1 RATIO — SIGNIFICANT CHANGE UP (ref 0.85–1.16)
INR BLD: 1.16 RATIO — SIGNIFICANT CHANGE UP (ref 0.85–1.16)
INR BLD: 1.17 RATIO — HIGH (ref 0.85–1.16)
INR BLD: 1.25 RATIO — HIGH (ref 0.85–1.16)
INR BLD: 1.26 RATIO — HIGH (ref 0.85–1.16)
INR BLD: 1.29 RATIO — HIGH (ref 0.85–1.16)
INR BLD: 1.35 RATIO — HIGH (ref 0.85–1.16)
INR BLD: 1.36 RATIO — HIGH (ref 0.85–1.16)
INR BLD: 1.4 RATIO — HIGH (ref 0.85–1.16)
INR BLD: 1.41 RATIO — HIGH (ref 0.85–1.16)
LACTATE SERPL-SCNC: 1.4 MMOL/L — SIGNIFICANT CHANGE UP (ref 0.5–2)
LACTATE SERPL-SCNC: 1.7 MMOL/L — SIGNIFICANT CHANGE UP (ref 0.5–2)
LACTATE SERPL-SCNC: 2 MMOL/L — SIGNIFICANT CHANGE UP (ref 0.5–2)
LACTATE SERPL-SCNC: 2.4 MMOL/L — HIGH (ref 0.5–2)
LACTATE SERPL-SCNC: 2.4 MMOL/L — HIGH (ref 0.5–2)
LACTATE SERPL-SCNC: 3.1 MMOL/L — HIGH (ref 0.5–2)
LDH SERPL L TO P-CCNC: 290 U/L — SIGNIFICANT CHANGE UP
LDH SERPL L TO P-CCNC: 564 U/L — SIGNIFICANT CHANGE UP
LYMPHOCYTES # BLD AUTO: 0.42 K/UL — LOW (ref 1–3.3)
LYMPHOCYTES # BLD AUTO: 0.46 K/UL — LOW (ref 1–3.3)
LYMPHOCYTES # BLD AUTO: 0.5 K/UL — LOW (ref 1–3.3)
LYMPHOCYTES # BLD AUTO: 0.51 K/UL — LOW (ref 1–3.3)
LYMPHOCYTES # BLD AUTO: 0.54 K/UL — LOW (ref 1–3.3)
LYMPHOCYTES # BLD AUTO: 0.59 K/UL — LOW (ref 1–3.3)
LYMPHOCYTES # BLD AUTO: 0.61 K/UL — LOW (ref 1–3.3)
LYMPHOCYTES # BLD AUTO: 0.62 K/UL — LOW (ref 1–3.3)
LYMPHOCYTES # BLD AUTO: 0.62 K/UL — LOW (ref 1–3.3)
LYMPHOCYTES # BLD AUTO: 0.63 K/UL — LOW (ref 1–3.3)
LYMPHOCYTES # BLD AUTO: 0.64 K/UL — LOW (ref 1–3.3)
LYMPHOCYTES # BLD AUTO: 0.72 K/UL — LOW (ref 1–3.3)
LYMPHOCYTES # BLD AUTO: 1.5 K/UL — SIGNIFICANT CHANGE UP (ref 1–3.3)
LYMPHOCYTES # BLD AUTO: 10.2 % — LOW (ref 13–44)
LYMPHOCYTES # BLD AUTO: 11 % — LOW (ref 13–44)
LYMPHOCYTES # BLD AUTO: 11.2 % — LOW (ref 13–44)
LYMPHOCYTES # BLD AUTO: 11.6 % — LOW (ref 13–44)
LYMPHOCYTES # BLD AUTO: 31.6 % — SIGNIFICANT CHANGE UP (ref 13–44)
LYMPHOCYTES # BLD AUTO: 4.1 % — LOW (ref 13–44)
LYMPHOCYTES # BLD AUTO: 4.8 % — LOW (ref 13–44)
LYMPHOCYTES # BLD AUTO: 6 % — LOW (ref 13–44)
LYMPHOCYTES # BLD AUTO: 6 % — LOW (ref 13–44)
LYMPHOCYTES # BLD AUTO: 6.3 % — LOW (ref 13–44)
LYMPHOCYTES # BLD AUTO: 6.5 % — LOW (ref 13–44)
LYMPHOCYTES # BLD AUTO: 8.5 % — LOW (ref 13–44)
LYMPHOCYTES # BLD AUTO: 9.3 % — LOW (ref 13–44)
LYMPHOCYTES # FLD: 4 % — SIGNIFICANT CHANGE UP
LYMPHOCYTES # FLD: 5 % — SIGNIFICANT CHANGE UP
LYMPHOCYTES # FLD: 9 % — SIGNIFICANT CHANGE UP
MAGNESIUM SERPL-MCNC: 1.9 MG/DL — SIGNIFICANT CHANGE UP (ref 1.6–2.6)
MAGNESIUM SERPL-MCNC: 2 MG/DL — SIGNIFICANT CHANGE UP (ref 1.6–2.6)
MAGNESIUM SERPL-MCNC: 2.1 MG/DL — SIGNIFICANT CHANGE UP (ref 1.6–2.6)
MAGNESIUM SERPL-MCNC: 2.3 MG/DL — SIGNIFICANT CHANGE UP (ref 1.6–2.6)
MCHC RBC-ENTMCNC: 26.3 PG — LOW (ref 27–34)
MCHC RBC-ENTMCNC: 26.7 PG — LOW (ref 27–34)
MCHC RBC-ENTMCNC: 26.7 PG — LOW (ref 27–34)
MCHC RBC-ENTMCNC: 27 PG — SIGNIFICANT CHANGE UP (ref 27–34)
MCHC RBC-ENTMCNC: 27.1 PG — SIGNIFICANT CHANGE UP (ref 27–34)
MCHC RBC-ENTMCNC: 27.1 PG — SIGNIFICANT CHANGE UP (ref 27–34)
MCHC RBC-ENTMCNC: 27.2 PG — SIGNIFICANT CHANGE UP (ref 27–34)
MCHC RBC-ENTMCNC: 27.3 PG — SIGNIFICANT CHANGE UP (ref 27–34)
MCHC RBC-ENTMCNC: 27.3 PG — SIGNIFICANT CHANGE UP (ref 27–34)
MCHC RBC-ENTMCNC: 27.5 PG — SIGNIFICANT CHANGE UP (ref 27–34)
MCHC RBC-ENTMCNC: 27.7 PG — SIGNIFICANT CHANGE UP (ref 27–34)
MCHC RBC-ENTMCNC: 28.9 G/DL — LOW (ref 32–36)
MCHC RBC-ENTMCNC: 29.1 G/DL — LOW (ref 32–36)
MCHC RBC-ENTMCNC: 30.1 G/DL — LOW (ref 32–36)
MCHC RBC-ENTMCNC: 30.3 G/DL — LOW (ref 32–36)
MCHC RBC-ENTMCNC: 30.4 G/DL — LOW (ref 32–36)
MCHC RBC-ENTMCNC: 30.8 G/DL — LOW (ref 32–36)
MCHC RBC-ENTMCNC: 31 G/DL — LOW (ref 32–36)
MCHC RBC-ENTMCNC: 31.1 G/DL — LOW (ref 32–36)
MCHC RBC-ENTMCNC: 31.1 G/DL — LOW (ref 32–36)
MCHC RBC-ENTMCNC: 31.3 G/DL — LOW (ref 32–36)
MCHC RBC-ENTMCNC: 31.6 G/DL — LOW (ref 32–36)
MCHC RBC-ENTMCNC: 32.1 G/DL — SIGNIFICANT CHANGE UP (ref 32–36)
MCHC RBC-ENTMCNC: 32.3 G/DL — SIGNIFICANT CHANGE UP (ref 32–36)
MCV RBC AUTO: 83.6 FL — SIGNIFICANT CHANGE UP (ref 80–100)
MCV RBC AUTO: 85 FL — SIGNIFICANT CHANGE UP (ref 80–100)
MCV RBC AUTO: 86.4 FL — SIGNIFICANT CHANGE UP (ref 80–100)
MCV RBC AUTO: 86.6 FL — SIGNIFICANT CHANGE UP (ref 80–100)
MCV RBC AUTO: 87.3 FL — SIGNIFICANT CHANGE UP (ref 80–100)
MCV RBC AUTO: 87.5 FL — SIGNIFICANT CHANGE UP (ref 80–100)
MCV RBC AUTO: 87.7 FL — SIGNIFICANT CHANGE UP (ref 80–100)
MCV RBC AUTO: 87.7 FL — SIGNIFICANT CHANGE UP (ref 80–100)
MCV RBC AUTO: 88.2 FL — SIGNIFICANT CHANGE UP (ref 80–100)
MCV RBC AUTO: 88.6 FL — SIGNIFICANT CHANGE UP (ref 80–100)
MCV RBC AUTO: 89.3 FL — SIGNIFICANT CHANGE UP (ref 80–100)
MCV RBC AUTO: 93.7 FL — SIGNIFICANT CHANGE UP (ref 80–100)
MCV RBC AUTO: 93.9 FL — SIGNIFICANT CHANGE UP (ref 80–100)
MESOTHL CELL # FLD: 0 % — SIGNIFICANT CHANGE UP
MESOTHL CELL # FLD: 3 % — SIGNIFICANT CHANGE UP
MONOCYTES # BLD AUTO: 0.45 K/UL — SIGNIFICANT CHANGE UP (ref 0–0.9)
MONOCYTES # BLD AUTO: 0.52 K/UL — SIGNIFICANT CHANGE UP (ref 0–0.9)
MONOCYTES # BLD AUTO: 0.54 K/UL — SIGNIFICANT CHANGE UP (ref 0–0.9)
MONOCYTES # BLD AUTO: 0.55 K/UL — SIGNIFICANT CHANGE UP (ref 0–0.9)
MONOCYTES # BLD AUTO: 0.56 K/UL — SIGNIFICANT CHANGE UP (ref 0–0.9)
MONOCYTES # BLD AUTO: 0.56 K/UL — SIGNIFICANT CHANGE UP (ref 0–0.9)
MONOCYTES # BLD AUTO: 0.58 K/UL — SIGNIFICANT CHANGE UP (ref 0–0.9)
MONOCYTES # BLD AUTO: 0.6 K/UL — SIGNIFICANT CHANGE UP (ref 0–0.9)
MONOCYTES # BLD AUTO: 0.7 K/UL — SIGNIFICANT CHANGE UP (ref 0–0.9)
MONOCYTES # BLD AUTO: 0.72 K/UL — SIGNIFICANT CHANGE UP (ref 0–0.9)
MONOCYTES # BLD AUTO: 0.73 K/UL — SIGNIFICANT CHANGE UP (ref 0–0.9)
MONOCYTES # BLD AUTO: 0.86 K/UL — SIGNIFICANT CHANGE UP (ref 0–0.9)
MONOCYTES # BLD AUTO: 0.87 K/UL — SIGNIFICANT CHANGE UP (ref 0–0.9)
MONOCYTES NFR BLD AUTO: 12.1 % — SIGNIFICANT CHANGE UP (ref 2–14)
MONOCYTES NFR BLD AUTO: 12.5 % — SIGNIFICANT CHANGE UP (ref 2–14)
MONOCYTES NFR BLD AUTO: 13 % — SIGNIFICANT CHANGE UP (ref 2–14)
MONOCYTES NFR BLD AUTO: 15.4 % — HIGH (ref 2–14)
MONOCYTES NFR BLD AUTO: 5.8 % — SIGNIFICANT CHANGE UP (ref 2–14)
MONOCYTES NFR BLD AUTO: 6.1 % — SIGNIFICANT CHANGE UP (ref 2–14)
MONOCYTES NFR BLD AUTO: 6.4 % — SIGNIFICANT CHANGE UP (ref 2–14)
MONOCYTES NFR BLD AUTO: 6.5 % — SIGNIFICANT CHANGE UP (ref 2–14)
MONOCYTES NFR BLD AUTO: 7.1 % — SIGNIFICANT CHANGE UP (ref 2–14)
MONOCYTES NFR BLD AUTO: 7.5 % — SIGNIFICANT CHANGE UP (ref 2–14)
MONOCYTES NFR BLD AUTO: 7.7 % — SIGNIFICANT CHANGE UP (ref 2–14)
MONOCYTES NFR BLD AUTO: 8.4 % — SIGNIFICANT CHANGE UP (ref 2–14)
MONOCYTES NFR BLD AUTO: 8.9 % — SIGNIFICANT CHANGE UP (ref 2–14)
MONOS+MACROS # FLD: 10 % — SIGNIFICANT CHANGE UP
MONOS+MACROS # FLD: 21 % — SIGNIFICANT CHANGE UP
MONOS+MACROS # FLD: 70 % — SIGNIFICANT CHANGE UP
MRSA PCR RESULT.: DETECTED
MRSA PCR RESULT.: DETECTED
NEUTROPHILS # BLD AUTO: 10.79 K/UL — HIGH (ref 1.8–7.4)
NEUTROPHILS # BLD AUTO: 2.34 K/UL — SIGNIFICANT CHANGE UP (ref 1.8–7.4)
NEUTROPHILS # BLD AUTO: 3.49 K/UL — SIGNIFICANT CHANGE UP (ref 1.8–7.4)
NEUTROPHILS # BLD AUTO: 4.16 K/UL — SIGNIFICANT CHANGE UP (ref 1.8–7.4)
NEUTROPHILS # BLD AUTO: 4.69 K/UL — SIGNIFICANT CHANGE UP (ref 1.8–7.4)
NEUTROPHILS # BLD AUTO: 4.71 K/UL — SIGNIFICANT CHANGE UP (ref 1.8–7.4)
NEUTROPHILS # BLD AUTO: 5.06 K/UL — SIGNIFICANT CHANGE UP (ref 1.8–7.4)
NEUTROPHILS # BLD AUTO: 5.2 K/UL — SIGNIFICANT CHANGE UP (ref 1.8–7.4)
NEUTROPHILS # BLD AUTO: 5.55 K/UL — SIGNIFICANT CHANGE UP (ref 1.8–7.4)
NEUTROPHILS # BLD AUTO: 7.02 K/UL — SIGNIFICANT CHANGE UP (ref 1.8–7.4)
NEUTROPHILS # BLD AUTO: 8.42 K/UL — HIGH (ref 1.8–7.4)
NEUTROPHILS # BLD AUTO: 8.66 K/UL — HIGH (ref 1.8–7.4)
NEUTROPHILS # BLD AUTO: 9.29 K/UL — HIGH (ref 1.8–7.4)
NEUTROPHILS NFR BLD AUTO: 49.4 % — SIGNIFICANT CHANGE UP (ref 43–77)
NEUTROPHILS NFR BLD AUTO: 72.6 % — SIGNIFICANT CHANGE UP (ref 43–77)
NEUTROPHILS NFR BLD AUTO: 73.2 % — SIGNIFICANT CHANGE UP (ref 43–77)
NEUTROPHILS NFR BLD AUTO: 75.3 % — SIGNIFICANT CHANGE UP (ref 43–77)
NEUTROPHILS NFR BLD AUTO: 76.1 % — SIGNIFICANT CHANGE UP (ref 43–77)
NEUTROPHILS NFR BLD AUTO: 76.3 % — SIGNIFICANT CHANGE UP (ref 43–77)
NEUTROPHILS NFR BLD AUTO: 78.8 % — HIGH (ref 43–77)
NEUTROPHILS NFR BLD AUTO: 79.9 % — HIGH (ref 43–77)
NEUTROPHILS NFR BLD AUTO: 86.6 % — HIGH (ref 43–77)
NEUTROPHILS NFR BLD AUTO: 86.6 % — HIGH (ref 43–77)
NEUTROPHILS NFR BLD AUTO: 87.3 % — HIGH (ref 43–77)
NEUTROPHILS NFR BLD AUTO: 88.2 % — HIGH (ref 43–77)
NEUTROPHILS NFR BLD AUTO: 88.8 % — HIGH (ref 43–77)
NEUTROPHILS-BODY FLUID: 16 % — SIGNIFICANT CHANGE UP
NEUTROPHILS-BODY FLUID: 71 % — SIGNIFICANT CHANGE UP
NEUTROPHILS-BODY FLUID: 81 % — SIGNIFICANT CHANGE UP
NIGHT BLUE STAIN TISS: SIGNIFICANT CHANGE UP
NIGHT BLUE STAIN TISS: SIGNIFICANT CHANGE UP
NON-GYNECOLOGICAL CYTOLOGY STUDY: SIGNIFICANT CHANGE UP
NRBC # BLD AUTO: 0 K/UL — SIGNIFICANT CHANGE UP (ref 0–0)
NRBC # BLD AUTO: 0.02 K/UL — HIGH (ref 0–0)
NRBC # BLD AUTO: 0.03 K/UL — HIGH (ref 0–0)
NRBC # BLD AUTO: 0.03 K/UL — HIGH (ref 0–0)
NRBC # BLD AUTO: 0.05 K/UL — HIGH (ref 0–0)
NRBC # BLD: 0 /100 WBCS — SIGNIFICANT CHANGE UP (ref 0–0)
NRBC # FLD: 0 K/UL — SIGNIFICANT CHANGE UP (ref 0–0)
NRBC # FLD: 0.02 K/UL — HIGH (ref 0–0)
NRBC # FLD: 0.03 K/UL — HIGH (ref 0–0)
NRBC # FLD: 0.03 K/UL — HIGH (ref 0–0)
NRBC # FLD: 0.05 K/UL — HIGH (ref 0–0)
NRBC BLD-RTO: 0 /100 WBCS — SIGNIFICANT CHANGE UP (ref 0–0)
OTHER CELLS FLD MANUAL: 0 % — SIGNIFICANT CHANGE UP
OTHER CELLS FLD MANUAL: 10 % — SIGNIFICANT CHANGE UP
PH FLD: 7.6 — SIGNIFICANT CHANGE UP
PH FLD: 8.1 — SIGNIFICANT CHANGE UP
PHOSPHATE SERPL-MCNC: 2.7 MG/DL — SIGNIFICANT CHANGE UP (ref 2.5–4.5)
PHOSPHATE SERPL-MCNC: 2.8 MG/DL — SIGNIFICANT CHANGE UP (ref 2.5–4.5)
PHOSPHATE SERPL-MCNC: 2.8 MG/DL — SIGNIFICANT CHANGE UP (ref 2.5–4.5)
PHOSPHATE SERPL-MCNC: 3.1 MG/DL — SIGNIFICANT CHANGE UP (ref 2.5–4.5)
PHOSPHATE SERPL-MCNC: 3.5 MG/DL — SIGNIFICANT CHANGE UP (ref 2.5–4.5)
PHOSPHATE SERPL-MCNC: 3.8 MG/DL — SIGNIFICANT CHANGE UP (ref 2.5–4.5)
PHOSPHATE SERPL-MCNC: 3.9 MG/DL — SIGNIFICANT CHANGE UP (ref 2.5–4.5)
PHOSPHATE SERPL-MCNC: 3.9 MG/DL — SIGNIFICANT CHANGE UP (ref 2.5–4.5)
PHOSPHATE SERPL-MCNC: 4.4 MG/DL — SIGNIFICANT CHANGE UP (ref 2.5–4.5)
PHOSPHATE SERPL-MCNC: 4.4 MG/DL — SIGNIFICANT CHANGE UP (ref 2.5–4.5)
PHOSPHATE SERPL-MCNC: 4.5 MG/DL — SIGNIFICANT CHANGE UP (ref 2.5–4.5)
PHOSPHATE SERPL-MCNC: 5.1 MG/DL — HIGH (ref 2.5–4.5)
PHOSPHATE SERPL-MCNC: 5.8 MG/DL — HIGH (ref 2.5–4.5)
PLATELET # BLD AUTO: 125 K/UL — LOW (ref 150–400)
PLATELET # BLD AUTO: 155 K/UL — SIGNIFICANT CHANGE UP (ref 150–400)
PLATELET # BLD AUTO: 156 K/UL — SIGNIFICANT CHANGE UP (ref 150–400)
PLATELET # BLD AUTO: 165 K/UL — SIGNIFICANT CHANGE UP (ref 150–400)
PLATELET # BLD AUTO: 171 K/UL — SIGNIFICANT CHANGE UP (ref 150–400)
PLATELET # BLD AUTO: 172 K/UL — SIGNIFICANT CHANGE UP (ref 150–400)
PLATELET # BLD AUTO: 172 K/UL — SIGNIFICANT CHANGE UP (ref 150–400)
PLATELET # BLD AUTO: 182 K/UL — SIGNIFICANT CHANGE UP (ref 150–400)
PLATELET # BLD AUTO: 188 K/UL — SIGNIFICANT CHANGE UP (ref 150–400)
PLATELET # BLD AUTO: 189 K/UL — SIGNIFICANT CHANGE UP (ref 150–400)
PLATELET # BLD AUTO: 201 K/UL — SIGNIFICANT CHANGE UP (ref 150–400)
PLATELET # BLD AUTO: 231 K/UL — SIGNIFICANT CHANGE UP (ref 150–400)
PLATELET # BLD AUTO: 280 K/UL — SIGNIFICANT CHANGE UP (ref 150–400)
POTASSIUM SERPL-MCNC: 3.7 MMOL/L — SIGNIFICANT CHANGE UP (ref 3.5–5.3)
POTASSIUM SERPL-MCNC: 3.9 MMOL/L — SIGNIFICANT CHANGE UP (ref 3.5–5.3)
POTASSIUM SERPL-MCNC: 3.9 MMOL/L — SIGNIFICANT CHANGE UP (ref 3.5–5.3)
POTASSIUM SERPL-MCNC: 4 MMOL/L — SIGNIFICANT CHANGE UP (ref 3.5–5.3)
POTASSIUM SERPL-MCNC: 4 MMOL/L — SIGNIFICANT CHANGE UP (ref 3.5–5.3)
POTASSIUM SERPL-MCNC: 4.1 MMOL/L — SIGNIFICANT CHANGE UP (ref 3.5–5.3)
POTASSIUM SERPL-MCNC: 4.2 MMOL/L — SIGNIFICANT CHANGE UP (ref 3.5–5.3)
POTASSIUM SERPL-MCNC: 4.3 MMOL/L — SIGNIFICANT CHANGE UP (ref 3.5–5.3)
POTASSIUM SERPL-MCNC: 4.7 MMOL/L — SIGNIFICANT CHANGE UP (ref 3.5–5.3)
POTASSIUM SERPL-MCNC: 4.8 MMOL/L — SIGNIFICANT CHANGE UP (ref 3.5–5.3)
POTASSIUM SERPL-MCNC: 5.2 MMOL/L — SIGNIFICANT CHANGE UP (ref 3.5–5.3)
POTASSIUM SERPL-MCNC: 5.2 MMOL/L — SIGNIFICANT CHANGE UP (ref 3.5–5.3)
POTASSIUM SERPL-SCNC: 3.7 MMOL/L — SIGNIFICANT CHANGE UP (ref 3.5–5.3)
POTASSIUM SERPL-SCNC: 3.9 MMOL/L — SIGNIFICANT CHANGE UP (ref 3.5–5.3)
POTASSIUM SERPL-SCNC: 3.9 MMOL/L — SIGNIFICANT CHANGE UP (ref 3.5–5.3)
POTASSIUM SERPL-SCNC: 4 MMOL/L — SIGNIFICANT CHANGE UP (ref 3.5–5.3)
POTASSIUM SERPL-SCNC: 4 MMOL/L — SIGNIFICANT CHANGE UP (ref 3.5–5.3)
POTASSIUM SERPL-SCNC: 4.1 MMOL/L — SIGNIFICANT CHANGE UP (ref 3.5–5.3)
POTASSIUM SERPL-SCNC: 4.2 MMOL/L — SIGNIFICANT CHANGE UP (ref 3.5–5.3)
POTASSIUM SERPL-SCNC: 4.3 MMOL/L — SIGNIFICANT CHANGE UP (ref 3.5–5.3)
POTASSIUM SERPL-SCNC: 4.7 MMOL/L — SIGNIFICANT CHANGE UP (ref 3.5–5.3)
POTASSIUM SERPL-SCNC: 4.8 MMOL/L — SIGNIFICANT CHANGE UP (ref 3.5–5.3)
POTASSIUM SERPL-SCNC: 5.2 MMOL/L — SIGNIFICANT CHANGE UP (ref 3.5–5.3)
POTASSIUM SERPL-SCNC: 5.2 MMOL/L — SIGNIFICANT CHANGE UP (ref 3.5–5.3)
PROT FLD-MCNC: 1.6 G/DL — SIGNIFICANT CHANGE UP
PROT FLD-MCNC: 2.9 G/DL — SIGNIFICANT CHANGE UP
PROT SERPL-MCNC: 6.4 G/DL — SIGNIFICANT CHANGE UP (ref 6–8.3)
PROT SERPL-MCNC: 6.5 G/DL — SIGNIFICANT CHANGE UP (ref 6–8.3)
PROT SERPL-MCNC: 6.5 G/DL — SIGNIFICANT CHANGE UP (ref 6–8.3)
PROT SERPL-MCNC: 6.8 G/DL — SIGNIFICANT CHANGE UP (ref 6–8.3)
PROT SERPL-MCNC: 6.9 G/DL — SIGNIFICANT CHANGE UP (ref 6–8.3)
PROT SERPL-MCNC: 6.9 G/DL — SIGNIFICANT CHANGE UP (ref 6–8.3)
PROT SERPL-MCNC: 7 G/DL — SIGNIFICANT CHANGE UP (ref 6–8.3)
PROT SERPL-MCNC: 7.1 G/DL — SIGNIFICANT CHANGE UP (ref 6–8.3)
PROT SERPL-MCNC: 7.4 G/DL — SIGNIFICANT CHANGE UP (ref 6–8.3)
PROT SERPL-MCNC: 7.6 G/DL — SIGNIFICANT CHANGE UP (ref 6–8.3)
PROT SERPL-MCNC: 7.7 G/DL — SIGNIFICANT CHANGE UP (ref 6–8.3)
PROT SERPL-MCNC: 7.9 G/DL — SIGNIFICANT CHANGE UP (ref 6–8.3)
PROTHROM AB SERPL-ACNC: 11.9 SEC — SIGNIFICANT CHANGE UP (ref 9.9–13.4)
PROTHROM AB SERPL-ACNC: 13.1 SEC — SIGNIFICANT CHANGE UP (ref 9.9–13.4)
PROTHROM AB SERPL-ACNC: 13.5 SEC — HIGH (ref 9.9–13.4)
PROTHROM AB SERPL-ACNC: 13.8 SEC — HIGH (ref 9.9–13.4)
PROTHROM AB SERPL-ACNC: 14.6 SEC — HIGH (ref 9.9–13.4)
PROTHROM AB SERPL-ACNC: 14.9 SEC — HIGH (ref 9.9–13.4)
PROTHROM AB SERPL-ACNC: 14.9 SEC — HIGH (ref 9.9–13.4)
PROTHROM AB SERPL-ACNC: 15.6 SEC — HIGH (ref 9.9–13.4)
PROTHROM AB SERPL-ACNC: 16.1 SEC — HIGH (ref 9.9–13.4)
PROTHROM AB SERPL-ACNC: 16.2 SEC — HIGH (ref 9.9–13.4)
PROTHROM AB SERPL-ACNC: 16.3 SEC — HIGH (ref 9.9–13.4)
RBC # BLD: 3.12 M/UL — LOW (ref 4.2–5.8)
RBC # BLD: 3.24 M/UL — LOW (ref 4.2–5.8)
RBC # BLD: 3.26 M/UL — LOW (ref 4.2–5.8)
RBC # BLD: 3.41 M/UL — LOW (ref 4.2–5.8)
RBC # BLD: 3.41 M/UL — LOW (ref 4.2–5.8)
RBC # BLD: 3.46 M/UL — LOW (ref 4.2–5.8)
RBC # BLD: 3.5 M/UL — LOW (ref 4.2–5.8)
RBC # BLD: 3.69 M/UL — LOW (ref 4.2–5.8)
RBC # BLD: 3.82 M/UL — LOW (ref 4.2–5.8)
RBC # BLD: 3.97 M/UL — LOW (ref 4.2–5.8)
RBC # BLD: 4 M/UL — LOW (ref 4.2–5.8)
RBC # BLD: 4.28 M/UL — SIGNIFICANT CHANGE UP (ref 4.2–5.8)
RBC # BLD: 5.05 M/UL — SIGNIFICANT CHANGE UP (ref 4.2–5.8)
RBC # FLD: 18.3 % — HIGH (ref 10.3–14.5)
RBC # FLD: 18.5 % — HIGH (ref 10.3–14.5)
RBC # FLD: 18.6 % — HIGH (ref 10.3–14.5)
RBC # FLD: 18.7 % — HIGH (ref 10.3–14.5)
RBC # FLD: 18.8 % — HIGH (ref 10.3–14.5)
RBC # FLD: 19.1 % — HIGH (ref 10.3–14.5)
RBC # FLD: 19.5 % — HIGH (ref 10.3–14.5)
RBC # FLD: 20.6 % — HIGH (ref 10.3–14.5)
RBC # FLD: 21.5 % — HIGH (ref 10.3–14.5)
RCV VOL RI: 6000 CELLS/UL — HIGH (ref 0–5)
RCV VOL RI: HIGH CELLS/UL (ref 0–5)
RCV VOL RI: SIGNIFICANT CHANGE UP CELLS/UL (ref 0–5)
RH IG SCN BLD-IMP: POSITIVE — SIGNIFICANT CHANGE UP
RH IG SCN BLD-IMP: POSITIVE — SIGNIFICANT CHANGE UP
RSV RNA NPH QL NAA+NON-PROBE: SIGNIFICANT CHANGE UP
S AUREUS DNA NOSE QL NAA+PROBE: DETECTED
S AUREUS DNA NOSE QL NAA+PROBE: DETECTED
SARS-COV-2 RNA SPEC QL NAA+PROBE: SIGNIFICANT CHANGE UP
SODIUM SERPL-SCNC: 132 MMOL/L — LOW (ref 135–145)
SODIUM SERPL-SCNC: 134 MMOL/L — LOW (ref 135–145)
SODIUM SERPL-SCNC: 134 MMOL/L — LOW (ref 135–145)
SODIUM SERPL-SCNC: 135 MMOL/L — SIGNIFICANT CHANGE UP (ref 135–145)
SODIUM SERPL-SCNC: 135 MMOL/L — SIGNIFICANT CHANGE UP (ref 135–145)
SODIUM SERPL-SCNC: 136 MMOL/L — SIGNIFICANT CHANGE UP (ref 135–145)
SODIUM SERPL-SCNC: 137 MMOL/L — SIGNIFICANT CHANGE UP (ref 135–145)
SODIUM SERPL-SCNC: 138 MMOL/L — SIGNIFICANT CHANGE UP (ref 135–145)
SODIUM SERPL-SCNC: 138 MMOL/L — SIGNIFICANT CHANGE UP (ref 135–145)
SPECIMEN SOURCE FLD: SIGNIFICANT CHANGE UP
SPECIMEN SOURCE FLD: SIGNIFICANT CHANGE UP
SPECIMEN SOURCE: SIGNIFICANT CHANGE UP
TOTAL CELLS COUNTED, BODY FLUID: 100 CELLS — SIGNIFICANT CHANGE UP
TOTAL NUCLEATED CELL COUNT, BODY FLUID: 4000 CELLS/UL — HIGH (ref 0–5)
TOTAL NUCLEATED CELL COUNT, BODY FLUID: 4482 CELLS/UL — HIGH (ref 0–5)
TOTAL NUCLEATED CELL COUNT, BODY FLUID: SIGNIFICANT CHANGE UP CELLS/UL (ref 0–5)
TROPONIN T, HIGH SENSITIVITY RESULT: CRITICAL HIGH NG/L
TROPONIN T, HIGH SENSITIVITY RESULT: SIGNIFICANT CHANGE UP NG/L
TSH SERPL-MCNC: 0.69 UIU/ML — SIGNIFICANT CHANGE UP (ref 0.27–4.2)
TUBE TYPE: SIGNIFICANT CHANGE UP
WBC # BLD: 10.72 K/UL — HIGH (ref 3.8–10.5)
WBC # BLD: 12.23 K/UL — HIGH (ref 3.8–10.5)
WBC # BLD: 4.64 K/UL — SIGNIFICANT CHANGE UP (ref 3.8–10.5)
WBC # BLD: 4.74 K/UL — SIGNIFICANT CHANGE UP (ref 3.8–10.5)
WBC # BLD: 5.74 K/UL — SIGNIFICANT CHANGE UP (ref 3.8–10.5)
WBC # BLD: 5.98 K/UL — SIGNIFICANT CHANGE UP (ref 3.8–10.5)
WBC # BLD: 6.41 K/UL — SIGNIFICANT CHANGE UP (ref 3.8–10.5)
WBC # BLD: 6.51 K/UL — SIGNIFICANT CHANGE UP (ref 3.8–10.5)
WBC # BLD: 6.64 K/UL — SIGNIFICANT CHANGE UP (ref 3.8–10.5)
WBC # BLD: 7.28 K/UL — SIGNIFICANT CHANGE UP (ref 3.8–10.5)
WBC # BLD: 8.11 K/UL — SIGNIFICANT CHANGE UP (ref 3.8–10.5)
WBC # BLD: 9.49 K/UL — SIGNIFICANT CHANGE UP (ref 3.8–10.5)
WBC # BLD: 9.91 K/UL — SIGNIFICANT CHANGE UP (ref 3.8–10.5)
WBC # FLD AUTO: 10.72 K/UL — HIGH (ref 3.8–10.5)
WBC # FLD AUTO: 12.23 K/UL — HIGH (ref 3.8–10.5)
WBC # FLD AUTO: 4.64 K/UL — SIGNIFICANT CHANGE UP (ref 3.8–10.5)
WBC # FLD AUTO: 4.74 K/UL — SIGNIFICANT CHANGE UP (ref 3.8–10.5)
WBC # FLD AUTO: 5.74 K/UL — SIGNIFICANT CHANGE UP (ref 3.8–10.5)
WBC # FLD AUTO: 5.98 K/UL — SIGNIFICANT CHANGE UP (ref 3.8–10.5)
WBC # FLD AUTO: 6.41 K/UL — SIGNIFICANT CHANGE UP (ref 3.8–10.5)
WBC # FLD AUTO: 6.51 K/UL — SIGNIFICANT CHANGE UP (ref 3.8–10.5)
WBC # FLD AUTO: 6.64 K/UL — SIGNIFICANT CHANGE UP (ref 3.8–10.5)
WBC # FLD AUTO: 7.28 K/UL — SIGNIFICANT CHANGE UP (ref 3.8–10.5)
WBC # FLD AUTO: 8.11 K/UL — SIGNIFICANT CHANGE UP (ref 3.8–10.5)
WBC # FLD AUTO: 9.49 K/UL — SIGNIFICANT CHANGE UP (ref 3.8–10.5)
WBC # FLD AUTO: 9.91 K/UL — SIGNIFICANT CHANGE UP (ref 3.8–10.5)

## 2024-01-01 PROCEDURE — 71045 X-RAY EXAM CHEST 1 VIEW: CPT | Mod: 26,77

## 2024-01-01 PROCEDURE — 71045 X-RAY EXAM CHEST 1 VIEW: CPT | Mod: 26

## 2024-01-01 PROCEDURE — 88112 CYTOPATH CELL ENHANCE TECH: CPT | Mod: 26

## 2024-01-01 PROCEDURE — 99233 SBSQ HOSP IP/OBS HIGH 50: CPT

## 2024-01-01 PROCEDURE — 31624 DX BRONCHOSCOPE/LAVAGE: CPT | Mod: GC

## 2024-01-01 PROCEDURE — 93010 ELECTROCARDIOGRAM REPORT: CPT

## 2024-01-01 PROCEDURE — 99233 SBSQ HOSP IP/OBS HIGH 50: CPT | Mod: GC,25

## 2024-01-01 PROCEDURE — 36556 INSERT NON-TUNNEL CV CATH: CPT | Mod: GC

## 2024-01-01 PROCEDURE — 99232 SBSQ HOSP IP/OBS MODERATE 35: CPT

## 2024-01-01 PROCEDURE — 93308 TTE F-UP OR LMTD: CPT | Mod: 26,GC

## 2024-01-01 PROCEDURE — 76604 US EXAM CHEST: CPT | Mod: 26,GC

## 2024-01-01 PROCEDURE — 99291 CRITICAL CARE FIRST HOUR: CPT | Mod: GC

## 2024-01-01 PROCEDURE — 93321 DOPPLER ECHO F-UP/LMTD STD: CPT | Mod: 26,GC

## 2024-01-01 PROCEDURE — 70450 CT HEAD/BRAIN W/O DYE: CPT | Mod: 26

## 2024-01-01 PROCEDURE — 70498 CT ANGIOGRAPHY NECK: CPT | Mod: 26,MC

## 2024-01-01 PROCEDURE — 93306 TTE W/DOPPLER COMPLETE: CPT | Mod: 26

## 2024-01-01 PROCEDURE — 99232 SBSQ HOSP IP/OBS MODERATE 35: CPT | Mod: GC

## 2024-01-01 PROCEDURE — 99291 CRITICAL CARE FIRST HOUR: CPT

## 2024-01-01 PROCEDURE — 70496 CT ANGIOGRAPHY HEAD: CPT | Mod: 26,MC

## 2024-01-01 PROCEDURE — 99497 ADVNCD CARE PLAN 30 MIN: CPT | Mod: 25

## 2024-01-01 PROCEDURE — 93325 DOPPLER ECHO COLOR FLOW MAPG: CPT | Mod: 26,GC

## 2024-01-01 PROCEDURE — 71250 CT THORAX DX C-: CPT | Mod: 26

## 2024-01-01 PROCEDURE — 32555 ASPIRATE PLEURA W/ IMAGING: CPT | Mod: GC,RT

## 2024-01-01 PROCEDURE — 70553 MRI BRAIN STEM W/O & W/DYE: CPT | Mod: 26

## 2024-01-01 PROCEDURE — 70450 CT HEAD/BRAIN W/O DYE: CPT | Mod: 26,77

## 2024-01-01 PROCEDURE — 70450 CT HEAD/BRAIN W/O DYE: CPT | Mod: 26,59,MC

## 2024-01-01 PROCEDURE — 31645 BRNCHSC W/THER ASPIR 1ST: CPT | Mod: GC

## 2024-01-01 PROCEDURE — 93321 DOPPLER ECHO F-UP/LMTD STD: CPT | Mod: 26

## 2024-01-01 PROCEDURE — 99291 CRITICAL CARE FIRST HOUR: CPT | Mod: GC,25

## 2024-01-01 PROCEDURE — 31500 INSERT EMERGENCY AIRWAY: CPT | Mod: GC

## 2024-01-01 PROCEDURE — 36620 INSERTION CATHETER ARTERY: CPT | Mod: GC

## 2024-01-01 PROCEDURE — 99222 1ST HOSP IP/OBS MODERATE 55: CPT | Mod: GC

## 2024-01-01 PROCEDURE — 99223 1ST HOSP IP/OBS HIGH 75: CPT

## 2024-01-01 PROCEDURE — 95718 EEG PHYS/QHP 2-12 HR W/VEEG: CPT

## 2024-01-01 PROCEDURE — 95720 EEG PHY/QHP EA INCR W/VEEG: CPT

## 2024-01-01 PROCEDURE — 32551 INSERTION OF CHEST TUBE: CPT | Mod: GC,RT

## 2024-01-01 PROCEDURE — 99222 1ST HOSP IP/OBS MODERATE 55: CPT

## 2024-01-01 PROCEDURE — 71260 CT THORAX DX C+: CPT | Mod: 26,MC

## 2024-01-01 PROCEDURE — 99223 1ST HOSP IP/OBS HIGH 75: CPT | Mod: GC

## 2024-01-01 PROCEDURE — 0042T: CPT | Mod: MC

## 2024-01-01 PROCEDURE — 74177 CT ABD & PELVIS W/CONTRAST: CPT | Mod: 26,MC

## 2024-01-01 PROCEDURE — 71045 X-RAY EXAM CHEST 1 VIEW: CPT | Mod: 26,76

## 2024-01-01 PROCEDURE — 88305 TISSUE EXAM BY PATHOLOGIST: CPT | Mod: 26

## 2024-01-01 RX ORDER — PIPERACILLIN-TAZO-DEXTROSE,ISO 3.375G/5
3.38 IV SOLUTION, PIGGYBACK PREMIX FROZEN(ML) INTRAVENOUS ONCE
Refills: 0 | Status: COMPLETED | OUTPATIENT
Start: 2024-01-01 | End: 2024-01-01

## 2024-01-01 RX ORDER — SOD PHOS DI, MONO/K PHOS MONO 250 MG
2 TABLET ORAL ONCE
Refills: 0 | Status: COMPLETED | OUTPATIENT
Start: 2024-01-01 | End: 2024-01-01

## 2024-01-01 RX ORDER — MIRTAZAPINE 30 MG/1
7.5 TABLET, FILM COATED ORAL DAILY
Refills: 0 | Status: DISCONTINUED | OUTPATIENT
Start: 2024-01-01 | End: 2024-01-01

## 2024-01-01 RX ORDER — POLYETHYLENE GLYCOL 3350 17 G/17G
17 POWDER, FOR SOLUTION ORAL EVERY 12 HOURS
Refills: 0 | Status: DISCONTINUED | OUTPATIENT
Start: 2024-01-01 | End: 2024-01-01

## 2024-01-01 RX ORDER — POLYETHYLENE GLYCOL 3350 17 G/17G
17 POWDER, FOR SOLUTION ORAL
Refills: 0 | DISCHARGE

## 2024-01-01 RX ORDER — ASPIRIN 325 MG
81 TABLET ORAL DAILY
Refills: 0 | Status: DISCONTINUED | OUTPATIENT
Start: 2024-01-01 | End: 2024-01-01

## 2024-01-01 RX ORDER — ACETAMINOPHEN 500 MG/5ML
700 LIQUID (ML) ORAL ONCE
Refills: 0 | Status: COMPLETED | OUTPATIENT
Start: 2024-01-01 | End: 2024-01-01

## 2024-01-01 RX ORDER — FENTANYL CITRATE-0.9 % NACL/PF 100MCG/2ML
100 SYRINGE (ML) INTRAVENOUS ONCE
Refills: 0 | Status: DISCONTINUED | OUTPATIENT
Start: 2024-01-01 | End: 2024-01-01

## 2024-01-01 RX ORDER — DROXIDOPA 300 MG/1
200 CAPSULE ORAL EVERY 8 HOURS
Refills: 0 | Status: DISCONTINUED | OUTPATIENT
Start: 2024-01-01 | End: 2025-01-01

## 2024-01-01 RX ORDER — DEXTROSE 50 % IN WATER 50 %
25 SYRINGE (ML) INTRAVENOUS ONCE
Refills: 0 | Status: DISCONTINUED | OUTPATIENT
Start: 2024-01-01 | End: 2025-01-01

## 2024-01-01 RX ORDER — BISACODYL 5 MG
1 TABLET, DELAYED RELEASE (ENTERIC COATED) ORAL
Refills: 0 | DISCHARGE

## 2024-01-01 RX ORDER — SENNA 187 MG
2 TABLET ORAL AT BEDTIME
Refills: 0 | Status: DISCONTINUED | OUTPATIENT
Start: 2024-01-01 | End: 2025-01-01

## 2024-01-01 RX ORDER — TIMOLOL MALEATE 6.8 MG/ML
1 SOLUTION OPHTHALMIC DAILY
Refills: 0 | Status: DISCONTINUED | OUTPATIENT
Start: 2024-01-01 | End: 2025-01-01

## 2024-01-01 RX ORDER — FENTANYL CITRATE-0.9 % NACL/PF 100MCG/2ML
1 SYRINGE (ML) INTRAVENOUS
Qty: 2500 | Refills: 0 | Status: DISCONTINUED | OUTPATIENT
Start: 2024-01-01 | End: 2024-01-01

## 2024-01-01 RX ORDER — MIDODRINE HYDROCHLORIDE 5 MG/1
7.5 TABLET ORAL
Refills: 0 | Status: DISCONTINUED | OUTPATIENT
Start: 2024-01-01 | End: 2024-01-01

## 2024-01-01 RX ORDER — INSULIN LISPRO 100 U/ML
INJECTION, SOLUTION INTRAVENOUS; SUBCUTANEOUS EVERY 6 HOURS
Refills: 0 | Status: DISCONTINUED | OUTPATIENT
Start: 2024-01-01 | End: 2025-01-01

## 2024-01-01 RX ORDER — IPRATROPIUM BROMIDE AND ALBUTEROL SULFATE .5; 2.5 MG/3ML; MG/3ML
3 SOLUTION RESPIRATORY (INHALATION) EVERY 6 HOURS
Refills: 0 | Status: DISCONTINUED | OUTPATIENT
Start: 2024-01-01 | End: 2025-01-01

## 2024-01-01 RX ORDER — VANCOMYCIN HCL IN 5 % DEXTROSE 1.5G/250ML
750 PLASTIC BAG, INJECTION (ML) INTRAVENOUS ONCE
Refills: 0 | Status: COMPLETED | OUTPATIENT
Start: 2024-01-01 | End: 2024-01-01

## 2024-01-01 RX ORDER — ASPIRIN 325 MG
81 TABLET ORAL DAILY
Refills: 0 | Status: DISCONTINUED | OUTPATIENT
Start: 2024-01-01 | End: 2025-01-01

## 2024-01-01 RX ORDER — PHENYLEPHRINE HCL IN 0.9% NACL 0.5 MG/5ML
200 SYRINGE (ML) INTRAVENOUS ONCE
Refills: 0 | Status: COMPLETED | OUTPATIENT
Start: 2024-01-01 | End: 2024-01-01

## 2024-01-01 RX ORDER — MIDAZOLAM IN 0.9 % SOD.CHLORID 1 MG/ML
6 PLASTIC BAG, INJECTION (ML) INTRAVENOUS ONCE
Refills: 0 | Status: DISCONTINUED | OUTPATIENT
Start: 2024-01-01 | End: 2024-01-01

## 2024-01-01 RX ORDER — POLYETHYLENE GLYCOL 3350 17 G/17G
17 POWDER, FOR SOLUTION ORAL EVERY 12 HOURS
Refills: 0 | Status: DISCONTINUED | OUTPATIENT
Start: 2024-01-01 | End: 2025-01-01

## 2024-01-01 RX ORDER — SALINE 7; 19 G/118ML; G/118ML
1 ENEMA RECTAL ONCE
Refills: 0 | Status: COMPLETED | OUTPATIENT
Start: 2024-01-01 | End: 2024-01-01

## 2024-01-01 RX ORDER — NOREPINEPHRINE BITARTRATE 8 MG
0.05 SOLUTION INTRAVENOUS
Qty: 8 | Refills: 0 | Status: DISCONTINUED | OUTPATIENT
Start: 2024-01-01 | End: 2024-01-01

## 2024-01-01 RX ORDER — DOBUTAMINE 250 MG/20ML
0.5 INJECTION INTRAVENOUS
Qty: 500 | Refills: 0 | Status: DISCONTINUED | OUTPATIENT
Start: 2024-01-01 | End: 2024-01-01

## 2024-01-01 RX ORDER — TIMOLOL MALEATE 6.8 MG/ML
1 SOLUTION OPHTHALMIC
Refills: 0 | DISCHARGE

## 2024-01-01 RX ORDER — HEPARIN SODIUM 1000 [USP'U]/ML
2700 INJECTION INTRAVENOUS; SUBCUTANEOUS ONCE
Refills: 0 | Status: COMPLETED | OUTPATIENT
Start: 2024-01-01 | End: 2024-01-01

## 2024-01-01 RX ORDER — B1/B2/B3/B5/B6/B12/VIT C/FOLIC 500-0.5 MG
1 TABLET ORAL DAILY
Refills: 0 | Status: DISCONTINUED | OUTPATIENT
Start: 2024-01-01 | End: 2025-01-01

## 2024-01-01 RX ORDER — CALCIUM GLUCONATE 20 MG/ML
1 INJECTION, SOLUTION INTRAVENOUS ONCE
Refills: 0 | Status: COMPLETED | OUTPATIENT
Start: 2024-01-01 | End: 2024-01-01

## 2024-01-01 RX ORDER — DOBUTAMINE 250 MG/20ML
5 INJECTION INTRAVENOUS
Qty: 500 | Refills: 0 | Status: DISCONTINUED | OUTPATIENT
Start: 2024-01-01 | End: 2024-01-01

## 2024-01-01 RX ORDER — DEXTROSE 50 % IN WATER 50 %
15 SYRINGE (ML) INTRAVENOUS ONCE
Refills: 0 | Status: DISCONTINUED | OUTPATIENT
Start: 2024-01-01 | End: 2025-01-01

## 2024-01-01 RX ORDER — BISACODYL 5 MG
10 TABLET, DELAYED RELEASE (ENTERIC COATED) ORAL DAILY
Refills: 0 | Status: DISCONTINUED | OUTPATIENT
Start: 2024-01-01 | End: 2024-01-01

## 2024-01-01 RX ORDER — PROPOFOL 10 MG/ML
30 INJECTION, EMULSION INTRAVENOUS
Qty: 1000 | Refills: 0 | Status: DISCONTINUED | OUTPATIENT
Start: 2024-01-01 | End: 2024-01-01

## 2024-01-01 RX ORDER — ATORVASTATIN CALCIUM 80 MG/1
40 TABLET, FILM COATED ORAL AT BEDTIME
Refills: 0 | Status: DISCONTINUED | OUTPATIENT
Start: 2024-01-01 | End: 2025-01-01

## 2024-01-01 RX ORDER — DEXTROSE 50 % IN WATER 50 %
12.5 SYRINGE (ML) INTRAVENOUS ONCE
Refills: 0 | Status: DISCONTINUED | OUTPATIENT
Start: 2024-01-01 | End: 2025-01-01

## 2024-01-01 RX ORDER — DOBUTAMINE 250 MG/20ML
2.5 INJECTION INTRAVENOUS
Qty: 500 | Refills: 0 | Status: DISCONTINUED | OUTPATIENT
Start: 2024-01-01 | End: 2024-01-01

## 2024-01-01 RX ORDER — GLUCAGON 3 MG/1
1 POWDER NASAL ONCE
Refills: 0 | Status: DISCONTINUED | OUTPATIENT
Start: 2024-01-01 | End: 2025-01-01

## 2024-01-01 RX ORDER — ACETAMINOPHEN 500 MG/5ML
700 LIQUID (ML) ORAL ONCE
Refills: 0 | Status: DISCONTINUED | OUTPATIENT
Start: 2024-01-01 | End: 2024-01-01

## 2024-01-01 RX ORDER — NOREPINEPHRINE BITARTRATE 8 MG
0.05 SOLUTION INTRAVENOUS
Qty: 16 | Refills: 0 | Status: DISCONTINUED | OUTPATIENT
Start: 2024-01-01 | End: 2025-01-01

## 2024-01-01 RX ORDER — OMEPRAZOLE 20 MG/1
1 CAPSULE, DELAYED RELEASE ORAL
Refills: 0 | DISCHARGE

## 2024-01-01 RX ORDER — PIPERACILLIN-TAZO-DEXTROSE,ISO 3.375G/5
3.38 IV SOLUTION, PIGGYBACK PREMIX FROZEN(ML) INTRAVENOUS EVERY 12 HOURS
Refills: 0 | Status: DISCONTINUED | OUTPATIENT
Start: 2024-01-01 | End: 2024-01-01

## 2024-01-01 RX ORDER — MAGNESIUM SULFATE 500 MG/ML
1 SYRINGE (ML) INJECTION ONCE
Refills: 0 | Status: COMPLETED | OUTPATIENT
Start: 2024-01-01 | End: 2024-01-01

## 2024-01-01 RX ORDER — MUPIROCIN CALCIUM 20 MG/G
1 CREAM TOPICAL
Refills: 0 | Status: COMPLETED | OUTPATIENT
Start: 2024-01-01 | End: 2024-01-01

## 2024-01-01 RX ORDER — HEPARIN SODIUM 1000 [USP'U]/ML
5000 INJECTION INTRAVENOUS; SUBCUTANEOUS EVERY 8 HOURS
Refills: 0 | Status: DISCONTINUED | OUTPATIENT
Start: 2024-01-01 | End: 2024-01-01

## 2024-01-01 RX ORDER — MIDAZOLAM IN 0.9 % SOD.CHLORID 1 MG/ML
4 PLASTIC BAG, INJECTION (ML) INTRAVENOUS ONCE
Refills: 0 | Status: DISCONTINUED | OUTPATIENT
Start: 2024-01-01 | End: 2024-01-01

## 2024-01-01 RX ORDER — ACETAMINOPHEN 500 MG/5ML
650 LIQUID (ML) ORAL EVERY 6 HOURS
Refills: 0 | Status: DISCONTINUED | OUTPATIENT
Start: 2024-01-01 | End: 2025-01-01

## 2024-01-01 RX ORDER — VANCOMYCIN HCL IN 5 % DEXTROSE 1.5G/250ML
1000 PLASTIC BAG, INJECTION (ML) INTRAVENOUS ONCE
Refills: 0 | Status: DISCONTINUED | OUTPATIENT
Start: 2024-01-01 | End: 2024-01-01

## 2024-01-01 RX ORDER — BISACODYL 5 MG
10 TABLET, DELAYED RELEASE (ENTERIC COATED) ORAL DAILY
Refills: 0 | Status: DISCONTINUED | OUTPATIENT
Start: 2024-01-01 | End: 2025-01-01

## 2024-01-01 RX ORDER — LACTOBACILLUS ACIDOPHILUS/PECT 75 MM-100
1 CAPSULE ORAL DAILY
Refills: 0 | Status: DISCONTINUED | OUTPATIENT
Start: 2024-01-01 | End: 2024-01-01

## 2024-01-01 RX ORDER — LACTOBACILLUS ACIDOPHILUS/PECT 75 MM-100
1 CAPSULE ORAL DAILY
Refills: 0 | Status: DISCONTINUED | OUTPATIENT
Start: 2024-01-01 | End: 2025-01-01

## 2024-01-01 RX ORDER — SODIUM CHLORIDE 9 G/1000ML
1000 INJECTION, SOLUTION INTRAVENOUS
Refills: 0 | Status: DISCONTINUED | OUTPATIENT
Start: 2024-01-01 | End: 2025-01-01

## 2024-01-01 RX ORDER — ASPIRIN 325 MG
324 TABLET ORAL ONCE
Refills: 0 | Status: DISCONTINUED | OUTPATIENT
Start: 2024-01-01 | End: 2024-01-01

## 2024-01-01 RX ORDER — ASPIRIN 325 MG
300 TABLET ORAL ONCE
Refills: 0 | Status: COMPLETED | OUTPATIENT
Start: 2024-01-01 | End: 2024-01-01

## 2024-01-01 RX ORDER — ATORVASTATIN CALCIUM 80 MG/1
40 TABLET, FILM COATED ORAL AT BEDTIME
Refills: 0 | Status: DISCONTINUED | OUTPATIENT
Start: 2024-01-01 | End: 2024-01-01

## 2024-01-01 RX ORDER — HEPARIN SODIUM 1000 [USP'U]/ML
INJECTION INTRAVENOUS; SUBCUTANEOUS
Qty: 25000 | Refills: 0 | Status: DISCONTINUED | OUTPATIENT
Start: 2024-01-01 | End: 2024-01-01

## 2024-01-01 RX ORDER — MIDODRINE HYDROCHLORIDE 5 MG/1
7.5 TABLET ORAL
Refills: 0 | Status: DISCONTINUED | OUTPATIENT
Start: 2024-01-01 | End: 2025-01-01

## 2024-01-01 RX ORDER — VASOPRESSIN 20 [USP'U]/ML
0.04 INJECTION INTRAVENOUS
Qty: 40 | Refills: 0 | Status: DISCONTINUED | OUTPATIENT
Start: 2024-01-01 | End: 2024-01-01

## 2024-01-01 RX ORDER — HEPARIN SODIUM 1000 [USP'U]/ML
5000 INJECTION INTRAVENOUS; SUBCUTANEOUS EVERY 12 HOURS
Refills: 0 | Status: DISCONTINUED | OUTPATIENT
Start: 2024-01-01 | End: 2025-01-01

## 2024-01-01 RX ORDER — NYSTATIN 100000 [USP'U]/G
1 CREAM TOPICAL
Refills: 0 | Status: DISCONTINUED | OUTPATIENT
Start: 2024-01-01 | End: 2025-01-01

## 2024-01-01 RX ORDER — HEPARIN SODIUM 1000 [USP'U]/ML
2700 INJECTION INTRAVENOUS; SUBCUTANEOUS EVERY 6 HOURS
Refills: 0 | Status: DISCONTINUED | OUTPATIENT
Start: 2024-01-01 | End: 2024-01-01

## 2024-01-01 RX ADMIN — IPRATROPIUM BROMIDE AND ALBUTEROL SULFATE 3 MILLILITER(S): .5; 2.5 SOLUTION RESPIRATORY (INHALATION) at 03:29

## 2024-01-01 RX ADMIN — NYSTATIN 1 APPLICATION(S): 100000 CREAM TOPICAL at 05:02

## 2024-01-01 RX ADMIN — IPRATROPIUM BROMIDE AND ALBUTEROL SULFATE 3 MILLILITER(S): .5; 2.5 SOLUTION RESPIRATORY (INHALATION) at 15:36

## 2024-01-01 RX ADMIN — TIMOLOL MALEATE 1 DROP(S): 6.8 SOLUTION OPHTHALMIC at 11:46

## 2024-01-01 RX ADMIN — MIRTAZAPINE 7.5 MILLIGRAM(S): 30 TABLET, FILM COATED ORAL at 11:50

## 2024-01-01 RX ADMIN — HEPARIN SODIUM 5000 UNIT(S): 1000 INJECTION INTRAVENOUS; SUBCUTANEOUS at 16:43

## 2024-01-01 RX ADMIN — Medication 81 MILLIGRAM(S): at 11:25

## 2024-01-01 RX ADMIN — HEPARIN SODIUM 2700 UNIT(S): 1000 INJECTION INTRAVENOUS; SUBCUTANEOUS at 10:35

## 2024-01-01 RX ADMIN — NYSTATIN 1 APPLICATION(S): 100000 CREAM TOPICAL at 05:15

## 2024-01-01 RX ADMIN — ATORVASTATIN CALCIUM 40 MILLIGRAM(S): 80 TABLET, FILM COATED ORAL at 22:32

## 2024-01-01 RX ADMIN — TIMOLOL MALEATE 1 DROP(S): 6.8 SOLUTION OPHTHALMIC at 11:49

## 2024-01-01 RX ADMIN — Medication 40 MILLIGRAM(S): at 06:21

## 2024-01-01 RX ADMIN — Medication 2 DROP(S): at 13:10

## 2024-01-01 RX ADMIN — Medication 15 MILLILITER(S): at 17:25

## 2024-01-01 RX ADMIN — Medication 1 TABLET(S): at 11:35

## 2024-01-01 RX ADMIN — Medication 1 TABLET(S): at 11:33

## 2024-01-01 RX ADMIN — Medication 15 MILLILITER(S): at 19:32

## 2024-01-01 RX ADMIN — Medication 2 TABLET(S): at 21:47

## 2024-01-01 RX ADMIN — MUPIROCIN CALCIUM 1 APPLICATION(S): 20 CREAM TOPICAL at 17:20

## 2024-01-01 RX ADMIN — Medication 500 MILLIGRAM(S): at 11:29

## 2024-01-01 RX ADMIN — Medication 15 MILLILITER(S): at 05:59

## 2024-01-01 RX ADMIN — INSULIN LISPRO 2: 100 INJECTION, SOLUTION INTRAVENOUS; SUBCUTANEOUS at 23:03

## 2024-01-01 RX ADMIN — Medication 200 MICROGRAM(S): at 22:36

## 2024-01-01 RX ADMIN — INSULIN LISPRO 3: 100 INJECTION, SOLUTION INTRAVENOUS; SUBCUTANEOUS at 12:18

## 2024-01-01 RX ADMIN — NOREPINEPHRINE BITARTRATE 2.16 MICROGRAM(S)/KG/MIN: 8 SOLUTION at 19:32

## 2024-01-01 RX ADMIN — CALCIUM GLUCONATE 100 GRAM(S): 20 INJECTION, SOLUTION INTRAVENOUS at 01:30

## 2024-01-01 RX ADMIN — NYSTATIN 1 APPLICATION(S): 100000 CREAM TOPICAL at 16:44

## 2024-01-01 RX ADMIN — DROXIDOPA 200 MILLIGRAM(S): 300 CAPSULE ORAL at 13:16

## 2024-01-01 RX ADMIN — INSULIN LISPRO 2: 100 INJECTION, SOLUTION INTRAVENOUS; SUBCUTANEOUS at 05:02

## 2024-01-01 RX ADMIN — Medication 15 MILLILITER(S): at 05:20

## 2024-01-01 RX ADMIN — IPRATROPIUM BROMIDE AND ALBUTEROL SULFATE 3 MILLILITER(S): .5; 2.5 SOLUTION RESPIRATORY (INHALATION) at 20:47

## 2024-01-01 RX ADMIN — NOREPINEPHRINE BITARTRATE 2.16 MICROGRAM(S)/KG/MIN: 8 SOLUTION at 11:34

## 2024-01-01 RX ADMIN — TIMOLOL MALEATE 1 DROP(S): 6.8 SOLUTION OPHTHALMIC at 11:26

## 2024-01-01 RX ADMIN — NOREPINEPHRINE BITARTRATE 2.16 MICROGRAM(S)/KG/MIN: 8 SOLUTION at 15:49

## 2024-01-01 RX ADMIN — Medication 81 MILLIGRAM(S): at 11:27

## 2024-01-01 RX ADMIN — NYSTATIN 1 APPLICATION(S): 100000 CREAM TOPICAL at 05:30

## 2024-01-01 RX ADMIN — Medication 4.6 MICROGRAM(S)/KG/HR: at 15:43

## 2024-01-01 RX ADMIN — Medication 1 TABLET(S): at 11:50

## 2024-01-01 RX ADMIN — IPRATROPIUM BROMIDE AND ALBUTEROL SULFATE 3 MILLILITER(S): .5; 2.5 SOLUTION RESPIRATORY (INHALATION) at 03:24

## 2024-01-01 RX ADMIN — Medication 500 MILLIGRAM(S): at 11:35

## 2024-01-01 RX ADMIN — MIRTAZAPINE 7.5 MILLIGRAM(S): 30 TABLET, FILM COATED ORAL at 11:29

## 2024-01-01 RX ADMIN — Medication 25 GRAM(S): at 17:41

## 2024-01-01 RX ADMIN — MIDODRINE HYDROCHLORIDE 7.5 MILLIGRAM(S): 5 TABLET ORAL at 11:27

## 2024-01-01 RX ADMIN — IPRATROPIUM BROMIDE AND ALBUTEROL SULFATE 3 MILLILITER(S): .5; 2.5 SOLUTION RESPIRATORY (INHALATION) at 07:56

## 2024-01-01 RX ADMIN — Medication 1 TABLET(S): at 11:46

## 2024-01-01 RX ADMIN — Medication 15 MILLILITER(S): at 05:01

## 2024-01-01 RX ADMIN — Medication 15 MILLILITER(S): at 17:28

## 2024-01-01 RX ADMIN — Medication 2 TABLET(S): at 21:36

## 2024-01-01 RX ADMIN — NYSTATIN 1 APPLICATION(S): 100000 CREAM TOPICAL at 17:27

## 2024-01-01 RX ADMIN — Medication 650 MILLIGRAM(S): at 21:39

## 2024-01-01 RX ADMIN — Medication 500 MILLIGRAM(S): at 11:27

## 2024-01-01 RX ADMIN — NOREPINEPHRINE BITARTRATE 4.31 MICROGRAM(S)/KG/MIN: 8 SOLUTION at 10:28

## 2024-01-01 RX ADMIN — POLYETHYLENE GLYCOL 3350 17 GRAM(S): 17 POWDER, FOR SOLUTION ORAL at 05:19

## 2024-01-01 RX ADMIN — HEPARIN SODIUM 5000 UNIT(S): 1000 INJECTION INTRAVENOUS; SUBCUTANEOUS at 05:42

## 2024-01-01 RX ADMIN — POLYETHYLENE GLYCOL 3350 17 GRAM(S): 17 POWDER, FOR SOLUTION ORAL at 06:05

## 2024-01-01 RX ADMIN — Medication 2 TABLET(S): at 21:12

## 2024-01-01 RX ADMIN — Medication 280 MILLIGRAM(S): at 21:26

## 2024-01-01 RX ADMIN — Medication 650 MILLIGRAM(S): at 18:22

## 2024-01-01 RX ADMIN — MUPIROCIN CALCIUM 1 APPLICATION(S): 20 CREAM TOPICAL at 05:36

## 2024-01-01 RX ADMIN — IPRATROPIUM BROMIDE AND ALBUTEROL SULFATE 3 MILLILITER(S): .5; 2.5 SOLUTION RESPIRATORY (INHALATION) at 14:35

## 2024-01-01 RX ADMIN — Medication 1 APPLICATION(S): at 06:05

## 2024-01-01 RX ADMIN — ATORVASTATIN CALCIUM 40 MILLIGRAM(S): 80 TABLET, FILM COATED ORAL at 21:12

## 2024-01-01 RX ADMIN — MUPIROCIN CALCIUM 1 APPLICATION(S): 20 CREAM TOPICAL at 17:40

## 2024-01-01 RX ADMIN — Medication 2 DROP(S): at 11:30

## 2024-01-01 RX ADMIN — Medication 2 PACKET(S): at 05:44

## 2024-01-01 RX ADMIN — MIRTAZAPINE 7.5 MILLIGRAM(S): 30 TABLET, FILM COATED ORAL at 11:25

## 2024-01-01 RX ADMIN — VASOPRESSIN 6 UNIT(S)/MIN: 20 INJECTION INTRAVENOUS at 11:54

## 2024-01-01 RX ADMIN — Medication 15 MILLILITER(S): at 05:36

## 2024-01-01 RX ADMIN — TIMOLOL MALEATE 1 DROP(S): 6.8 SOLUTION OPHTHALMIC at 11:30

## 2024-01-01 RX ADMIN — IPRATROPIUM BROMIDE AND ALBUTEROL SULFATE 3 MILLILITER(S): .5; 2.5 SOLUTION RESPIRATORY (INHALATION) at 14:57

## 2024-01-01 RX ADMIN — Medication 2 DROP(S): at 11:26

## 2024-01-01 RX ADMIN — Medication 40 MILLIGRAM(S): at 06:43

## 2024-01-01 RX ADMIN — Medication 650 MILLIGRAM(S): at 00:00

## 2024-01-01 RX ADMIN — NYSTATIN 1 APPLICATION(S): 100000 CREAM TOPICAL at 18:13

## 2024-01-01 RX ADMIN — DOBUTAMINE 6.9 MICROGRAM(S)/KG/MIN: 250 INJECTION INTRAVENOUS at 10:28

## 2024-01-01 RX ADMIN — Medication 2 DROP(S): at 11:49

## 2024-01-01 RX ADMIN — Medication 500 MILLIGRAM(S): at 11:33

## 2024-01-01 RX ADMIN — IPRATROPIUM BROMIDE AND ALBUTEROL SULFATE 3 MILLILITER(S): .5; 2.5 SOLUTION RESPIRATORY (INHALATION) at 09:48

## 2024-01-01 RX ADMIN — IPRATROPIUM BROMIDE AND ALBUTEROL SULFATE 3 MILLILITER(S): .5; 2.5 SOLUTION RESPIRATORY (INHALATION) at 15:11

## 2024-01-01 RX ADMIN — IPRATROPIUM BROMIDE AND ALBUTEROL SULFATE 3 MILLILITER(S): .5; 2.5 SOLUTION RESPIRATORY (INHALATION) at 03:45

## 2024-01-01 RX ADMIN — IPRATROPIUM BROMIDE AND ALBUTEROL SULFATE 3 MILLILITER(S): .5; 2.5 SOLUTION RESPIRATORY (INHALATION) at 03:02

## 2024-01-01 RX ADMIN — Medication 280 MILLIGRAM(S): at 16:26

## 2024-01-01 RX ADMIN — SALINE 1 ENEMA: 7; 19 ENEMA RECTAL at 18:19

## 2024-01-01 RX ADMIN — HEPARIN SODIUM 5000 UNIT(S): 1000 INJECTION INTRAVENOUS; SUBCUTANEOUS at 17:51

## 2024-01-01 RX ADMIN — Medication 1 TABLET(S): at 11:26

## 2024-01-01 RX ADMIN — IPRATROPIUM BROMIDE AND ALBUTEROL SULFATE 3 MILLILITER(S): .5; 2.5 SOLUTION RESPIRATORY (INHALATION) at 10:56

## 2024-01-01 RX ADMIN — ATORVASTATIN CALCIUM 40 MILLIGRAM(S): 80 TABLET, FILM COATED ORAL at 21:47

## 2024-01-01 RX ADMIN — Medication 1 TABLET(S): at 11:29

## 2024-01-01 RX ADMIN — POLYETHYLENE GLYCOL 3350 17 GRAM(S): 17 POWDER, FOR SOLUTION ORAL at 18:01

## 2024-01-01 RX ADMIN — Medication 1 APPLICATION(S): at 05:30

## 2024-01-01 RX ADMIN — MUPIROCIN CALCIUM 1 APPLICATION(S): 20 CREAM TOPICAL at 05:30

## 2024-01-01 RX ADMIN — Medication 40 MILLIGRAM(S): at 06:03

## 2024-01-01 RX ADMIN — Medication 4.6 MICROGRAM(S)/KG/HR: at 21:55

## 2024-01-01 RX ADMIN — IPRATROPIUM BROMIDE AND ALBUTEROL SULFATE 3 MILLILITER(S): .5; 2.5 SOLUTION RESPIRATORY (INHALATION) at 19:47

## 2024-01-01 RX ADMIN — HEPARIN SODIUM 5000 UNIT(S): 1000 INJECTION INTRAVENOUS; SUBCUTANEOUS at 05:14

## 2024-01-01 RX ADMIN — IPRATROPIUM BROMIDE AND ALBUTEROL SULFATE 3 MILLILITER(S): .5; 2.5 SOLUTION RESPIRATORY (INHALATION) at 21:55

## 2024-01-01 RX ADMIN — MIDODRINE HYDROCHLORIDE 7.5 MILLIGRAM(S): 5 TABLET ORAL at 11:50

## 2024-01-01 RX ADMIN — Medication 81 MILLIGRAM(S): at 11:46

## 2024-01-01 RX ADMIN — Medication 1 APPLICATION(S): at 06:11

## 2024-01-01 RX ADMIN — HEPARIN SODIUM 5000 UNIT(S): 1000 INJECTION INTRAVENOUS; SUBCUTANEOUS at 06:06

## 2024-01-01 RX ADMIN — NYSTATIN 1 APPLICATION(S): 100000 CREAM TOPICAL at 17:17

## 2024-01-01 RX ADMIN — MIRTAZAPINE 7.5 MILLIGRAM(S): 30 TABLET, FILM COATED ORAL at 13:09

## 2024-01-01 RX ADMIN — IPRATROPIUM BROMIDE AND ALBUTEROL SULFATE 3 MILLILITER(S): .5; 2.5 SOLUTION RESPIRATORY (INHALATION) at 20:40

## 2024-01-01 RX ADMIN — Medication 280 MILLIGRAM(S): at 06:03

## 2024-01-01 RX ADMIN — Medication 25 GRAM(S): at 22:41

## 2024-01-01 RX ADMIN — IPRATROPIUM BROMIDE AND ALBUTEROL SULFATE 3 MILLILITER(S): .5; 2.5 SOLUTION RESPIRATORY (INHALATION) at 15:31

## 2024-01-01 RX ADMIN — HEPARIN SODIUM 5000 UNIT(S): 1000 INJECTION INTRAVENOUS; SUBCUTANEOUS at 17:20

## 2024-01-01 RX ADMIN — INSULIN LISPRO 1: 100 INJECTION, SOLUTION INTRAVENOUS; SUBCUTANEOUS at 17:28

## 2024-01-01 RX ADMIN — Medication 2 DROP(S): at 13:08

## 2024-01-01 RX ADMIN — Medication 25 GRAM(S): at 05:36

## 2024-01-01 RX ADMIN — NYSTATIN 1 APPLICATION(S): 100000 CREAM TOPICAL at 17:50

## 2024-01-01 RX ADMIN — Medication 200 GRAM(S): at 21:22

## 2024-01-01 RX ADMIN — TIMOLOL MALEATE 1 DROP(S): 6.8 SOLUTION OPHTHALMIC at 13:10

## 2024-01-01 RX ADMIN — Medication 40 MILLIGRAM(S): at 05:59

## 2024-01-01 RX ADMIN — MIRTAZAPINE 7.5 MILLIGRAM(S): 30 TABLET, FILM COATED ORAL at 11:33

## 2024-01-01 RX ADMIN — Medication 2 DROP(S): at 17:16

## 2024-01-01 RX ADMIN — IPRATROPIUM BROMIDE AND ALBUTEROL SULFATE 3 MILLILITER(S): .5; 2.5 SOLUTION RESPIRATORY (INHALATION) at 08:57

## 2024-01-01 RX ADMIN — NYSTATIN 1 APPLICATION(S): 100000 CREAM TOPICAL at 05:36

## 2024-01-01 RX ADMIN — IPRATROPIUM BROMIDE AND ALBUTEROL SULFATE 3 MILLILITER(S): .5; 2.5 SOLUTION RESPIRATORY (INHALATION) at 19:39

## 2024-01-01 RX ADMIN — Medication 2 TABLET(S): at 22:05

## 2024-01-01 RX ADMIN — Medication 280 MILLIGRAM(S): at 20:00

## 2024-01-01 RX ADMIN — Medication 1 APPLICATION(S): at 05:02

## 2024-01-01 RX ADMIN — Medication 1 TABLET(S): at 11:27

## 2024-01-01 RX ADMIN — Medication 300 MILLIGRAM(S): at 10:39

## 2024-01-01 RX ADMIN — Medication 15 MILLILITER(S): at 17:12

## 2024-01-01 RX ADMIN — HEPARIN SODIUM 5000 UNIT(S): 1000 INJECTION INTRAVENOUS; SUBCUTANEOUS at 05:59

## 2024-01-01 RX ADMIN — MUPIROCIN CALCIUM 1 APPLICATION(S): 20 CREAM TOPICAL at 05:15

## 2024-01-01 RX ADMIN — ATORVASTATIN CALCIUM 40 MILLIGRAM(S): 80 TABLET, FILM COATED ORAL at 22:07

## 2024-01-01 RX ADMIN — ATORVASTATIN CALCIUM 40 MILLIGRAM(S): 80 TABLET, FILM COATED ORAL at 22:05

## 2024-01-01 RX ADMIN — IPRATROPIUM BROMIDE AND ALBUTEROL SULFATE 3 MILLILITER(S): .5; 2.5 SOLUTION RESPIRATORY (INHALATION) at 03:33

## 2024-01-01 RX ADMIN — Medication 2 DROP(S): at 11:28

## 2024-01-01 RX ADMIN — Medication 700 MILLIGRAM(S): at 17:00

## 2024-01-01 RX ADMIN — Medication 15 MILLILITER(S): at 18:13

## 2024-01-01 RX ADMIN — Medication 40 MILLIGRAM(S): at 06:24

## 2024-01-01 RX ADMIN — NOREPINEPHRINE BITARTRATE 2.16 MICROGRAM(S)/KG/MIN: 8 SOLUTION at 19:07

## 2024-01-01 RX ADMIN — IPRATROPIUM BROMIDE AND ALBUTEROL SULFATE 3 MILLILITER(S): .5; 2.5 SOLUTION RESPIRATORY (INHALATION) at 10:47

## 2024-01-01 RX ADMIN — TIMOLOL MALEATE 1 DROP(S): 6.8 SOLUTION OPHTHALMIC at 11:54

## 2024-01-01 RX ADMIN — IPRATROPIUM BROMIDE AND ALBUTEROL SULFATE 3 MILLILITER(S): .5; 2.5 SOLUTION RESPIRATORY (INHALATION) at 03:38

## 2024-01-01 RX ADMIN — IPRATROPIUM BROMIDE AND ALBUTEROL SULFATE 3 MILLILITER(S): .5; 2.5 SOLUTION RESPIRATORY (INHALATION) at 08:05

## 2024-01-01 RX ADMIN — NYSTATIN 1 APPLICATION(S): 100000 CREAM TOPICAL at 17:40

## 2024-01-01 RX ADMIN — HEPARIN SODIUM 5000 UNIT(S): 1000 INJECTION INTRAVENOUS; SUBCUTANEOUS at 15:32

## 2024-01-01 RX ADMIN — POLYETHYLENE GLYCOL 3350 17 GRAM(S): 17 POWDER, FOR SOLUTION ORAL at 19:31

## 2024-01-01 RX ADMIN — TIMOLOL MALEATE 1 DROP(S): 6.8 SOLUTION OPHTHALMIC at 11:53

## 2024-01-01 RX ADMIN — Medication 6 MILLIGRAM(S): at 16:26

## 2024-01-01 RX ADMIN — INSULIN LISPRO 1: 100 INJECTION, SOLUTION INTRAVENOUS; SUBCUTANEOUS at 23:38

## 2024-01-01 RX ADMIN — MUPIROCIN CALCIUM 1 APPLICATION(S): 20 CREAM TOPICAL at 17:51

## 2024-01-01 RX ADMIN — POLYETHYLENE GLYCOL 3350 17 GRAM(S): 17 POWDER, FOR SOLUTION ORAL at 18:13

## 2024-01-01 RX ADMIN — ATORVASTATIN CALCIUM 40 MILLIGRAM(S): 80 TABLET, FILM COATED ORAL at 22:16

## 2024-01-01 RX ADMIN — Medication 500 MILLIGRAM(S): at 13:11

## 2024-01-01 RX ADMIN — POLYETHYLENE GLYCOL 3350 17 GRAM(S): 17 POWDER, FOR SOLUTION ORAL at 05:02

## 2024-01-01 RX ADMIN — Medication 1 APPLICATION(S): at 05:14

## 2024-01-01 RX ADMIN — Medication 250 MILLIGRAM(S): at 19:35

## 2024-01-01 RX ADMIN — ATORVASTATIN CALCIUM 40 MILLIGRAM(S): 80 TABLET, FILM COATED ORAL at 21:54

## 2024-01-01 RX ADMIN — INSULIN LISPRO 4: 100 INJECTION, SOLUTION INTRAVENOUS; SUBCUTANEOUS at 11:53

## 2024-01-01 RX ADMIN — Medication 81 MILLIGRAM(S): at 13:11

## 2024-01-01 RX ADMIN — Medication 40 MILLIGRAM(S): at 06:50

## 2024-01-01 RX ADMIN — Medication 81 MILLIGRAM(S): at 11:33

## 2024-01-01 RX ADMIN — IPRATROPIUM BROMIDE AND ALBUTEROL SULFATE 3 MILLILITER(S): .5; 2.5 SOLUTION RESPIRATORY (INHALATION) at 08:14

## 2024-01-01 RX ADMIN — IPRATROPIUM BROMIDE AND ALBUTEROL SULFATE 3 MILLILITER(S): .5; 2.5 SOLUTION RESPIRATORY (INHALATION) at 13:03

## 2024-01-01 RX ADMIN — HEPARIN SODIUM 5000 UNIT(S): 1000 INJECTION INTRAVENOUS; SUBCUTANEOUS at 18:12

## 2024-01-01 RX ADMIN — MUPIROCIN CALCIUM 1 APPLICATION(S): 20 CREAM TOPICAL at 17:25

## 2024-01-01 RX ADMIN — Medication 700 MILLIGRAM(S): at 06:15

## 2024-01-01 RX ADMIN — IPRATROPIUM BROMIDE AND ALBUTEROL SULFATE 3 MILLILITER(S): .5; 2.5 SOLUTION RESPIRATORY (INHALATION) at 09:13

## 2024-01-01 RX ADMIN — Medication 2 DROP(S): at 11:45

## 2024-01-01 RX ADMIN — INSULIN LISPRO 2: 100 INJECTION, SOLUTION INTRAVENOUS; SUBCUTANEOUS at 13:09

## 2024-01-01 RX ADMIN — MUPIROCIN CALCIUM 1 APPLICATION(S): 20 CREAM TOPICAL at 05:43

## 2024-01-01 RX ADMIN — NYSTATIN 1 APPLICATION(S): 100000 CREAM TOPICAL at 19:30

## 2024-01-01 RX ADMIN — Medication 40 MILLIGRAM(S): at 05:02

## 2024-01-01 RX ADMIN — NOREPINEPHRINE BITARTRATE 4.31 MICROGRAM(S)/KG/MIN: 8 SOLUTION at 02:34

## 2024-01-01 RX ADMIN — Medication 25 GRAM(S): at 05:20

## 2024-01-01 RX ADMIN — DOBUTAMINE 3.45 MICROGRAM(S)/KG/MIN: 250 INJECTION INTRAVENOUS at 19:15

## 2024-01-01 RX ADMIN — Medication 15 MILLILITER(S): at 06:03

## 2024-01-01 RX ADMIN — NOREPINEPHRINE BITARTRATE 2.16 MICROGRAM(S)/KG/MIN: 8 SOLUTION at 15:00

## 2024-01-01 RX ADMIN — HEPARIN SODIUM 5000 UNIT(S): 1000 INJECTION INTRAVENOUS; SUBCUTANEOUS at 05:02

## 2024-01-01 RX ADMIN — NOREPINEPHRINE BITARTRATE 4.31 MICROGRAM(S)/KG/MIN: 8 SOLUTION at 16:44

## 2024-01-01 RX ADMIN — Medication 100 GRAM(S): at 01:56

## 2024-01-01 RX ADMIN — HEPARIN SODIUM 5000 UNIT(S): 1000 INJECTION INTRAVENOUS; SUBCUTANEOUS at 17:11

## 2024-01-01 RX ADMIN — MIRTAZAPINE 7.5 MILLIGRAM(S): 30 TABLET, FILM COATED ORAL at 11:27

## 2024-01-01 RX ADMIN — POLYETHYLENE GLYCOL 3350 17 GRAM(S): 17 POWDER, FOR SOLUTION ORAL at 05:59

## 2024-01-01 RX ADMIN — Medication 2 DROP(S): at 11:54

## 2024-01-01 RX ADMIN — Medication 650 MILLIGRAM(S): at 18:52

## 2024-01-01 RX ADMIN — Medication 1 TABLET(S): at 13:10

## 2024-01-01 RX ADMIN — NYSTATIN 1 APPLICATION(S): 100000 CREAM TOPICAL at 06:11

## 2024-01-01 RX ADMIN — Medication 1 TABLET(S): at 13:09

## 2024-01-01 RX ADMIN — NYSTATIN 1 APPLICATION(S): 100000 CREAM TOPICAL at 06:06

## 2024-01-01 RX ADMIN — IPRATROPIUM BROMIDE AND ALBUTEROL SULFATE 3 MILLILITER(S): .5; 2.5 SOLUTION RESPIRATORY (INHALATION) at 16:27

## 2024-01-01 RX ADMIN — Medication 25 GRAM(S): at 17:50

## 2024-01-01 RX ADMIN — POLYETHYLENE GLYCOL 3350 17 GRAM(S): 17 POWDER, FOR SOLUTION ORAL at 05:15

## 2024-01-01 RX ADMIN — Medication 2 TABLET(S): at 22:16

## 2024-01-01 RX ADMIN — IPRATROPIUM BROMIDE AND ALBUTEROL SULFATE 3 MILLILITER(S): .5; 2.5 SOLUTION RESPIRATORY (INHALATION) at 15:22

## 2024-01-01 RX ADMIN — Medication 500 MILLIGRAM(S): at 11:25

## 2024-01-01 RX ADMIN — MUPIROCIN CALCIUM 1 APPLICATION(S): 20 CREAM TOPICAL at 06:00

## 2024-01-01 RX ADMIN — POLYETHYLENE GLYCOL 3350 17 GRAM(S): 17 POWDER, FOR SOLUTION ORAL at 17:27

## 2024-01-01 RX ADMIN — INSULIN LISPRO 2: 100 INJECTION, SOLUTION INTRAVENOUS; SUBCUTANEOUS at 06:03

## 2024-01-01 RX ADMIN — HEPARIN SODIUM 5000 UNIT(S): 1000 INJECTION INTRAVENOUS; SUBCUTANEOUS at 22:04

## 2024-01-01 RX ADMIN — Medication 40 MILLIGRAM(S): at 05:15

## 2024-01-01 RX ADMIN — Medication 500 MILLIGRAM(S): at 11:50

## 2024-01-01 RX ADMIN — NOREPINEPHRINE BITARTRATE 2.16 MICROGRAM(S)/KG/MIN: 8 SOLUTION at 11:54

## 2024-01-01 RX ADMIN — Medication 2 TABLET(S): at 22:32

## 2024-01-01 RX ADMIN — VASOPRESSIN 6 UNIT(S)/MIN: 20 INJECTION INTRAVENOUS at 19:16

## 2024-01-01 RX ADMIN — IPRATROPIUM BROMIDE AND ALBUTEROL SULFATE 3 MILLILITER(S): .5; 2.5 SOLUTION RESPIRATORY (INHALATION) at 03:08

## 2024-01-01 RX ADMIN — ATORVASTATIN CALCIUM 40 MILLIGRAM(S): 80 TABLET, FILM COATED ORAL at 21:29

## 2024-01-01 RX ADMIN — POLYETHYLENE GLYCOL 3350 17 GRAM(S): 17 POWDER, FOR SOLUTION ORAL at 16:44

## 2024-01-01 RX ADMIN — HEPARIN SODIUM 5000 UNIT(S): 1000 INJECTION INTRAVENOUS; SUBCUTANEOUS at 05:20

## 2024-01-01 RX ADMIN — POLYETHYLENE GLYCOL 3350 17 GRAM(S): 17 POWDER, FOR SOLUTION ORAL at 17:50

## 2024-01-01 RX ADMIN — Medication 15 MILLILITER(S): at 17:40

## 2024-01-01 RX ADMIN — NYSTATIN 1 APPLICATION(S): 100000 CREAM TOPICAL at 17:25

## 2024-01-01 RX ADMIN — Medication 15 MILLILITER(S): at 17:20

## 2024-01-01 RX ADMIN — Medication 4 MILLIGRAM(S): at 14:16

## 2024-01-01 RX ADMIN — Medication 15 MILLILITER(S): at 16:43

## 2024-01-01 RX ADMIN — Medication 25 GRAM(S): at 06:00

## 2024-01-01 RX ADMIN — NOREPINEPHRINE BITARTRATE 2.16 MICROGRAM(S)/KG/MIN: 8 SOLUTION at 19:17

## 2024-01-01 RX ADMIN — Medication 25 GRAM(S): at 17:10

## 2024-01-01 RX ADMIN — POLYETHYLENE GLYCOL 3350 17 GRAM(S): 17 POWDER, FOR SOLUTION ORAL at 05:36

## 2024-01-01 RX ADMIN — TIMOLOL MALEATE 1 DROP(S): 6.8 SOLUTION OPHTHALMIC at 11:28

## 2024-01-01 RX ADMIN — NYSTATIN 1 APPLICATION(S): 100000 CREAM TOPICAL at 05:43

## 2024-01-01 RX ADMIN — Medication 1 TABLET(S): at 11:25

## 2024-01-01 RX ADMIN — MUPIROCIN CALCIUM 1 APPLICATION(S): 20 CREAM TOPICAL at 17:11

## 2024-01-01 RX ADMIN — VASOPRESSIN 6 UNIT(S)/MIN: 20 INJECTION INTRAVENOUS at 08:32

## 2024-01-01 RX ADMIN — NOREPINEPHRINE BITARTRATE 2.16 MICROGRAM(S)/KG/MIN: 8 SOLUTION at 08:32

## 2024-01-01 RX ADMIN — IPRATROPIUM BROMIDE AND ALBUTEROL SULFATE 3 MILLILITER(S): .5; 2.5 SOLUTION RESPIRATORY (INHALATION) at 09:26

## 2024-01-01 RX ADMIN — Medication 81 MILLIGRAM(S): at 11:50

## 2024-01-01 RX ADMIN — NOREPINEPHRINE BITARTRATE 4.31 MICROGRAM(S)/KG/MIN: 8 SOLUTION at 07:43

## 2024-01-01 RX ADMIN — TIMOLOL MALEATE 1 DROP(S): 6.8 SOLUTION OPHTHALMIC at 17:16

## 2024-01-01 RX ADMIN — Medication 1 APPLICATION(S): at 05:36

## 2024-01-01 RX ADMIN — Medication 25 GRAM(S): at 05:43

## 2024-01-01 RX ADMIN — INSULIN LISPRO 1: 100 INJECTION, SOLUTION INTRAVENOUS; SUBCUTANEOUS at 17:09

## 2024-01-01 RX ADMIN — Medication 1 APPLICATION(S): at 06:00

## 2024-01-01 RX ADMIN — POLYETHYLENE GLYCOL 3350 17 GRAM(S): 17 POWDER, FOR SOLUTION ORAL at 17:11

## 2024-01-01 RX ADMIN — Medication 2 TABLET(S): at 21:29

## 2024-01-01 RX ADMIN — IPRATROPIUM BROMIDE AND ALBUTEROL SULFATE 3 MILLILITER(S): .5; 2.5 SOLUTION RESPIRATORY (INHALATION) at 19:18

## 2024-01-01 RX ADMIN — NYSTATIN 1 APPLICATION(S): 100000 CREAM TOPICAL at 17:11

## 2024-01-01 RX ADMIN — Medication 15 MILLILITER(S): at 17:49

## 2024-01-01 RX ADMIN — POLYETHYLENE GLYCOL 3350 17 GRAM(S): 17 POWDER, FOR SOLUTION ORAL at 17:41

## 2024-01-01 RX ADMIN — INSULIN LISPRO 1: 100 INJECTION, SOLUTION INTRAVENOUS; SUBCUTANEOUS at 18:12

## 2024-01-01 RX ADMIN — HEPARIN SODIUM 5000 UNIT(S): 1000 INJECTION INTRAVENOUS; SUBCUTANEOUS at 17:40

## 2024-01-01 RX ADMIN — Medication 81 MILLIGRAM(S): at 11:35

## 2024-01-01 RX ADMIN — IPRATROPIUM BROMIDE AND ALBUTEROL SULFATE 3 MILLILITER(S): .5; 2.5 SOLUTION RESPIRATORY (INHALATION) at 20:12

## 2024-01-01 RX ADMIN — HEPARIN SODIUM 500 UNIT(S)/HR: 1000 INJECTION INTRAVENOUS; SUBCUTANEOUS at 10:35

## 2024-01-01 RX ADMIN — MIRTAZAPINE 7.5 MILLIGRAM(S): 30 TABLET, FILM COATED ORAL at 11:34

## 2024-01-01 RX ADMIN — NYSTATIN 1 APPLICATION(S): 100000 CREAM TOPICAL at 06:00

## 2024-01-01 RX ADMIN — Medication 81 MILLIGRAM(S): at 11:29

## 2024-01-01 RX ADMIN — NYSTATIN 1 APPLICATION(S): 100000 CREAM TOPICAL at 17:20

## 2024-01-01 RX ADMIN — Medication 1 APPLICATION(S): at 05:43

## 2024-01-01 RX ADMIN — Medication 25 GRAM(S): at 17:25

## 2024-01-01 RX ADMIN — Medication 700 MILLIGRAM(S): at 20:15

## 2024-01-01 RX ADMIN — IPRATROPIUM BROMIDE AND ALBUTEROL SULFATE 3 MILLILITER(S): .5; 2.5 SOLUTION RESPIRATORY (INHALATION) at 19:15

## 2024-01-01 RX ADMIN — Medication 700 MILLIGRAM(S): at 21:40

## 2024-01-01 RX ADMIN — ATORVASTATIN CALCIUM 40 MILLIGRAM(S): 80 TABLET, FILM COATED ORAL at 21:36

## 2024-01-01 RX ADMIN — HEPARIN SODIUM 5000 UNIT(S): 1000 INJECTION INTRAVENOUS; SUBCUTANEOUS at 17:28

## 2024-01-01 RX ADMIN — DROXIDOPA 200 MILLIGRAM(S): 300 CAPSULE ORAL at 21:48

## 2024-01-01 RX ADMIN — Medication 2 TABLET(S): at 21:54

## 2024-01-01 RX ADMIN — DOBUTAMINE 3.45 MICROGRAM(S)/KG/MIN: 250 INJECTION INTRAVENOUS at 10:47

## 2024-01-01 RX ADMIN — Medication 15 MILLILITER(S): at 05:43

## 2024-01-01 RX ADMIN — Medication 4.6 MICROGRAM(S)/KG/HR: at 07:43

## 2024-01-01 RX ADMIN — Medication 500 MILLIGRAM(S): at 11:46

## 2024-01-01 RX ADMIN — VASOPRESSIN 6 UNIT(S)/MIN: 20 INJECTION INTRAVENOUS at 19:06

## 2024-01-01 RX ADMIN — Medication 15 MILLILITER(S): at 06:10

## 2024-01-01 RX ADMIN — NOREPINEPHRINE BITARTRATE 2.16 MICROGRAM(S)/KG/MIN: 8 SOLUTION at 20:45

## 2024-01-01 RX ADMIN — VASOPRESSIN 6 UNIT(S)/MIN: 20 INJECTION INTRAVENOUS at 06:43

## 2024-01-01 RX ADMIN — Medication 15 MILLILITER(S): at 05:14

## 2024-01-01 RX ADMIN — Medication 40 MILLIGRAM(S): at 06:05

## 2024-01-01 RX ADMIN — DOBUTAMINE 3.45 MICROGRAM(S)/KG/MIN: 250 INJECTION INTRAVENOUS at 15:49

## 2024-01-01 RX ADMIN — Medication 25 GRAM(S): at 09:52

## 2024-01-01 RX ADMIN — POLYETHYLENE GLYCOL 3350 17 GRAM(S): 17 POWDER, FOR SOLUTION ORAL at 05:43

## 2024-01-10 NOTE — DIETITIAN INITIAL EVALUATION ADULT - REASON INDICATOR FOR ASSESSMENT
Pre-Operative Instructions    Special Instructions for your procedure from Luan Phelps MD    Patient Name: Patricia De Souza Procedure/Surgery: Tonsillectomy possible Adenoidectomy    Surgery Date: 2/7/24       Arrival Time: You will receive a call from the Ambulatory Surgery Department approximately 3 days prior to your surgery date. At that time, the nurse will review your health history, give you instructions for the ASC and the Arrival time for your procedure.    Please ignore any Robo calls or times in the [facility] pretty. and only follow the time given by the ASC staff.  If you do not receive a call within 2 days of your surgery, please call [phone].     The [facility] requires History and Physical Exams to be done within 30 days of surgery. If this appointment is not completed within this time frame, an additional visit and co-pay may apply.      o History and Physical (to be completed 2 weeks prior to surgery): By Dr. Fleming on 1/22/24 at 4:45 PM.  o Medications: To be taken as directed by PCP office, please discuss during your history and physical appointment.   o Pre op labs required prior to surgery per surgeon : CBC, CMP, PT/INR  o EKG needed (all patients over 50 years old) [address] [facility] Cardiology department WALK IN 8:00am to 4:45pm: N/A  o Post-op Appointment: 3/8/24 at 3:00 PM   o Additional orders: N/A    Nothing to eat or drink after midnight the night before surgery, no breakfast, no water, no coffee, etc.    You need someone to be with you and drive you home, stay the remainder of the day and night while anesthesia is in your system.    ALL NSAID'S, herbal supplements and vitamins to hold 1 week prior to surgical procedure. Clearance will be obtained prior to surgery.    If you have FMLA/disability paperwork, collect it from work and bring it into any desk staff at an advocate site to sent to disability.     On the day of surgery:  - Please bring with you a photo ID and insurance  card.   Failure to have these documents will result in cancellation of your surgery.   -Failure to complete any of the following requirements may result in the delay or cancellation of your surgery.     : Zoraida               Contact Number: [phone]      Ambulatory Surgery Center Guidelines    If there is any change in your health, please contact your surgeon (fever, chest pain, shortness of breath, respiratory infections), or if you have started any new medications since your surgery was scheduled.    Plan for unforeseen changes in surgery time.      Although we try to maintain a set surgery schedule, there are times when a surgery case may take longer than expected. This may result in your being in the surgery center longer than originally planned. It is best to \"free up\" your entire day to allow for any unforeseen time changes.      Arrange for an adult (18 yrs old or greater) to stay with you at the surgery center. It is very important that you have an adult stay with you at the surgery center during your procedure. The surgeon will want to discuss your surgery and post-operative care with an adult friend or adult family member. Additionally, an adult will be needed to drive you home. An adult must stay with you for the first 24 hours after your surgery. IF YOU DO NOT HAVE AN ADULT TO DRIVE YOU HOME, YOUR SURGERY WILL BE CANCELLED.     Make arrangements for young children to stay with a  or family member. Children under the age of 12 must stay in the waiting area and must be accompanied by an adult at all times.      Medications             Unless otherwise instructed by your physician: STOP over-the-counter medications, anti-inflammatories (for example Aspirin, Advil, Aleve, Ibuprofen, Motrin), Vitamins, herbal supplements for 7 days prior to surgery. Tylenol, or Acetaminophen based products may be used.    PATIENTS ON BLOOD THINNERS  IF YOU HAVE NOT RECEIVED INSTRUCTIONS REGARDING  YOUR BLOOD THINNING MEDICATIONS WITHIN 7 DAYS OF YOUR SURGERY, PLEASE CALL THE SURGEON'S OFFICE TO SPEAK TO NURSE FOR FOLLOW UP. FAILURE TO DO SO MAY RESULT IN CANCELLATION OF YOUR SURGERY.     Medication Instructions for Day of Surgery:   Please take your cardiac medicine, high blood pressure medicine, prednisone, and any seizure medication with a sip of water. Do not take any diuretic (water pills) or oral medication for diabetes.  For insulin dependent diabetics:  Do not take any short acting or sliding scale insulin in the morning.  Unless otherwise directed by your personal physician, follow the following instructions: If your procedure is scheduled before noon, take one half of your long acting insulin in the morning.  If your procedure is scheduled after 12 noon, take one third of your long acting insulin in the morning.        Remember to follow pre-operative dietary restrictions.     An empty stomach is imperative to prevent nausea or vomiting during and after your procedure. It is important that you \"not eat or drink anything\" after midnight the night of your scheduled surgery. Even that cup of coffee seems harmless, it may lead to a significant delay or even a cancellation of your surgery. Have fluids and soft bland food available at home for the initial recovery period.       Things to bring with you:  a list of your current medications (including \"over the counter\" and herbal medications)  important legal documents such as 's license, insurance card, Power of , Guardianship papers  any assistive device you are currently using (crutches, walker, hearing aid, etc.)  Inhalers    Things to leave at home: jewelry, piercings, purse, wallet, money, and other valuable items. Do not wear makeup or contact lenses.    Limit visitors while you are at the surgery center.    Avoid alcoholic beverages for at least 24 hours prior to your surgery.     Wear loose comfortable clothes. Clothes that are easy  to put on and take off are best. Sweat pants, shirts with buttons, and slip-on shoes are wise choices. Avoid tight-fitting clothing such as blue jeans and turtlenecks.       Ask questions if you are unsure about any information given to you. We always want you to feel safe and confident in the care you are receiving.     Location:  [facility] is located at [facility], [address] [place] [address] in [place], [place]. Enter through the main entrance. When you get inside the first set of glass doors, look to the left. On the glass wall is a sign that reads [facility]. Go through the doors and take the stairs or the elevator down to the lower level. Check in with the .       TO ALL AMBULATORY SURGERY PATIENTS    The [facility] utilizes [facility], for anesthesia services. Patients receiving services at the [facility] may receive three separate bills - one from [facility] for professional services, one from the [facility] for facility services, and one from [facility], for anesthesiology services. Please verify with your insurer that [facility], are in your network as it does differ from [facility].    You can decide today about the care you will receive in the future.  [facility] respects your rights and will support your decisions to the fullest extent permitted by law, including your right to refuse or accept medical or surgical treatment. Please be advised that the [facility] prohibits ambulatory surgery centers from upholding Advanced Directives during surgical procedures. For this reason, any Advanced Directive will be null and void during your stay in the Ambulatory Surgery Center.     Although not honored in the Ambulatory Surgery Center, you are still entitled to be informed about your rights surrounding Advanced Directives. Advanced  Directives are legal documents that enable you to specify what forms of treatment you want performed or withheld should you become unable to make or communicate these decisions on your own.  Although this is not a common decision made by outpatient surgery patients, we would like to let you know that an information booklet, \"A Personal Decision\" is available upon your request.      How do you know if an Advanced Directive is right for you?  It is important to realize your rights as an individual, what the nature of consent for treatment implies, what a durable power of  for health care is and what a living will is.      After reviewing the booklet, \"A Personal Decision,\" should you have any further questions, please call us at [phone].      Thank you.     PATIENT’S RIGHTS    To be treated with respect, consideration and dignity, free from all forms of abuse, harassment and discrimination.     To receive quality care and high professional standards in a safe environment.    To be provided with appropriate privacy.    To expect that all disclosures and records are treated confidentially and, except when required by law, to be given the opportunity to approve or refuse their release.    To be provided, to the degree known, complete information concerning their diagnosis, treatment and prognosis. When appropriate, the information is provided to a person designated by the patient to be a legally authorized person/surrogate.    To review the records pertaining to his/her medical care and to have the information explained or interpreted as necessary except when restricted law.    To expect that we will communicate with you in a matter that you can understand.     To be given the names of all practitioners and health care personnel participating in his/her care.    To make decisions about the plan of care prior to and during the course of treatment.  To refuse a recommended treatment or plan of care to the  extent permitted by law and to be informed of the medical consequences of this action.     To expect that your treatment preferences will be responded to as delineated in your Advanced Directive, within the limits of the ASC bylaws regarding resuscitation    To be informed as to:  Rights and Responsibilities.  Services available in the organization.  Provisions for after-hours and emergency care.   The charges for services and available payment options.   The right to consent or decline participation in proposed research  studies.   The available resources for resolving disputes, grievances, and conflicts.      To expect emergency procedures to be implemented without unnecessary delay.    To expect a safe hospital transfer with appropriate medical records when necessary.     To know that all patients rights extend to the patient’s legal representatives/surrogates.     Complaints regarding Patients Rights or any issue surrounding care received during your stay can be made by contacting any of the following organizations:  Medicare patient: Visit the Office of Medicare Beneficiary Ombudsman at www.medicare.gov on the web.  Or call 1-800-Medicare (1-148.319.1873).     TTY users should call 1-256.691.5875.  Non-Medicare patients can contact the [facility] at [phone]; fax [phone], TTY [phone].  Any patient can also contact the [employer] at [phone] (toll free 8:30 am to 5:00 pm, central time, weekdays).      *The Ambulatory Surgery Center is a partnership between [facility] and [facility].     Patient Responsibilities    It is your responsibility as a [facility] patient:    To provide all personal and family health information needed to provide you with appropriate care.    To participate to the best of your ability in making decisions about your medical treatment, and to comply with the agreed upon plan of  care.    To ask questions of your physician or other care providers when you do not understand any information or instructions.    To maintain appointments as scheduled, or to reschedule in a timely fashion.    To inform your physician and other care providers if you anticipate problems in following prescribed treatment.    To recognize the impact of your lifestyle on your personal health.    To inform your physician or other care provider if you desire a transfer of care to another physician.    To be considerate of others receiving and providing care.  To observe relevant surgery center policies and procedures.    To accept financial responsibility for health care services and to work cooperatively with [facility] to resolve financial obligations     Pt seen for consult for pressure ulcer

## 2024-01-16 ENCOUNTER — NON-APPOINTMENT (OUTPATIENT)
Age: 69
End: 2024-01-16

## 2024-01-16 ENCOUNTER — APPOINTMENT (OUTPATIENT)
Dept: ELECTROPHYSIOLOGY | Facility: CLINIC | Age: 69
End: 2024-01-16
Payer: MEDICAID

## 2024-01-17 PROCEDURE — 93298 REM INTERROG DEV EVAL SCRMS: CPT

## 2024-02-10 NOTE — ED PROVIDER NOTE - INTERNATIONAL TRAVEL
Labs reviewed with patient as listed in H&P. In general stable, spot 0.28, AST/ALT with mild elevations but not doubled. Normal creatinine, plt 144K, uric acid 5.1    Headache improved after tylenol, but patient with last 2 BP of 160/87 and 164/89    Will start IV labetalol, Magnesium, betamethasone and [place] [facility] [place].      Transfer accepted by Dr. Escalona   No

## 2024-02-20 ENCOUNTER — NON-APPOINTMENT (OUTPATIENT)
Age: 69
End: 2024-02-20

## 2024-02-20 ENCOUNTER — APPOINTMENT (OUTPATIENT)
Dept: ELECTROPHYSIOLOGY | Facility: CLINIC | Age: 69
End: 2024-02-20
Payer: MEDICAID

## 2024-02-20 PROCEDURE — 93298 REM INTERROG DEV EVAL SCRMS: CPT

## 2024-03-13 NOTE — ED ADULT NURSE NOTE - ISOLATION TYPE:
"Chief Complaint   Patient presents with    Rash    Sore Throat       History of Present Illness      The patient presents with a 3 day history of a sore throat. The symptoms are bilateral. The patient also reports rash but denies a dry cough, a wet cough, wheezing, fever, facial pain, a headache, eye drainage, ear pain, ear drainage, a runny nose, nausea, vomiting, diarrhea, abdominal pain, nasal congestion, myalgias or decreased appetite. The patient has not tried anything for treatment of this illness.  She presents for an initial evaluation with a rash on her entire body. This has been present for several days. The skin condition is described as non-painful and itchy. There are no exposures to people with similar lesions. There is no history of new cosmetic or detergent usage on the affected area. There has not been a fever.  She denies other skin lesions.    The patient has been exposed to strep throat.    Medications    No current outpatient medications on file.     Allergies    No Known Allergies    Problem List    Patient Active Problem List   Diagnosis    Liveborn infant by vaginal delivery       Medications, Allergies, Problems List and Past History were reviewed and updated.    Physical Examination    Pulse 136   Temp 97.8 °F (36.6 °C) (Temporal)   Resp 30   Ht 94 cm (37.01\")   Wt 13.2 kg (29 lb 3.2 oz)   HC 49.7 cm (19.57\")   BMI 14.99 kg/m²       HEENT: Head- Normocephalic Atraumatic. Facies- Within normal limits. Pinnas- Normal texture and shape bilaterally. Canals- Normal bilaterally. TMs- Normal bilaterally. Nares- Patent bilaterally. Nasal Septum- is normal. There is no tenderness to palpation over the frontal or maxillary sinuses. Lids- Normal bilaterally. Conjunctiva- Clear bilaterally. Sclera- Anicteric bilaterally. Oropharynx- has palatal petechia and diffuse erythema. Tonsils- No enlargement, erythema or exudate.    Lungs: Auscultation- Clear to auscultation bilaterally. There are no " retractions, clubbing or cyanosis. The Expiratory to Inspiratory ratio is equal.    Lymph Nodes: Cervical- There are shotty bilateral anterior lymph nodes.    Cardiovascular: Heart- Normal Rate with Regular rhythm and no murmurs.    Abdomen: Soft, benign, non-tender with no masses, hernias, organomegaly or scars.    Dermatologic: The patient has a scaly, blanching, sandpapery diffuse rash on her entire body.    Impression and Assessment    Scarlet Fever.    Plan    Scarlet Fever Plan: A cool mist humidifier was encouraged. She was encouraged to drink plenty of fluids. Medication will be added as noted below.    Diagnoses and all orders for this visit:    1. Scarlet fever (Primary)  -     amoxicillin (AMOXIL) 400 MG/5ML suspension; Take 3.7 mL by mouth 2 (Two) Times a Day for 10 days.  Dispense: 74 mL; Refill: 0          Return to Office    The patient was instructed to return for follow-up as needed. The patient was instructed to return sooner if the condition changes, worsens, or does not resolve.   None

## 2024-03-25 ENCOUNTER — APPOINTMENT (OUTPATIENT)
Dept: ELECTROPHYSIOLOGY | Facility: CLINIC | Age: 69
End: 2024-03-25
Payer: MEDICAID

## 2024-03-25 ENCOUNTER — NON-APPOINTMENT (OUTPATIENT)
Age: 69
End: 2024-03-25

## 2024-03-25 PROCEDURE — 93298 REM INTERROG DEV EVAL SCRMS: CPT

## 2024-04-19 NOTE — ED ADULT TRIAGE NOTE - MODE OF ARRIVAL
Prior Authorization Form:      DEMOGRAPHICS:                     Patient Name:  Mickey Cohen  Patient :  1965            Insurance:  Payor: BCBS / Plan: [place] P[place] / Product Type: *No Product type* /   Insurance ID Number:    Payer/Plan Subscr  Sex Relation Sub. Ins. ID Effective Group Num   1. BCBS - BCBS -* MICKEY COHEN 1965 Male Self [id number] 1/1/15 [id number]                                    [id number]         DIAGNOSIS & PROCEDURE:                       Procedure/Operation: EGD           CPT Code: 57772    Diagnosis:  Peptic ulcer disease    ICD10 Code: K27.9    Location:  [facility]    Surgeon:  Dr Johnson    SCHEDULING INFORMATION:                          Date: 24    Time: 7:45 am              Anesthesia:  LMAC                                                       Status:  Outpatient        Special Comments:         Electronically signed by Seble Woods MA on 2024 at 9:54 AM    
EMS

## 2024-04-24 ENCOUNTER — INPATIENT (INPATIENT)
Facility: HOSPITAL | Age: 69
LOS: 21 days | Discharge: NURSING, REHAB OR LONG-TERM CARE | End: 2024-05-16
Attending: STUDENT IN AN ORGANIZED HEALTH CARE EDUCATION/TRAINING PROGRAM | Admitting: STUDENT IN AN ORGANIZED HEALTH CARE EDUCATION/TRAINING PROGRAM
Payer: MEDICAID

## 2024-04-24 VITALS
DIASTOLIC BLOOD PRESSURE: 78 MMHG | OXYGEN SATURATION: 95 % | HEART RATE: 118 BPM | SYSTOLIC BLOOD PRESSURE: 134 MMHG | RESPIRATION RATE: 20 BRPM | TEMPERATURE: 98 F

## 2024-04-24 DIAGNOSIS — Z89.611 ACQUIRED ABSENCE OF RIGHT LEG ABOVE KNEE: Chronic | ICD-10-CM

## 2024-04-24 DIAGNOSIS — Z89.612 ACQUIRED ABSENCE OF LEFT LEG ABOVE KNEE: Chronic | ICD-10-CM

## 2024-04-24 DIAGNOSIS — Z98.890 OTHER SPECIFIED POSTPROCEDURAL STATES: Chronic | ICD-10-CM

## 2024-04-24 DIAGNOSIS — Z89.511 ACQUIRED ABSENCE OF RIGHT LEG BELOW KNEE: Chronic | ICD-10-CM

## 2024-04-24 LAB
ALBUMIN SERPL ELPH-MCNC: 3.5 G/DL — SIGNIFICANT CHANGE UP (ref 3.3–5)
ALP SERPL-CCNC: 107 U/L — SIGNIFICANT CHANGE UP (ref 40–120)
ALT FLD-CCNC: 19 U/L — SIGNIFICANT CHANGE UP (ref 4–41)
ANION GAP SERPL CALC-SCNC: 11 MMOL/L — SIGNIFICANT CHANGE UP (ref 7–14)
APPEARANCE UR: ABNORMAL
APTT BLD: 24.2 SEC — LOW (ref 24.5–35.6)
AST SERPL-CCNC: 41 U/L — HIGH (ref 4–40)
BASOPHILS # BLD AUTO: 0.03 K/UL — SIGNIFICANT CHANGE UP (ref 0–0.2)
BASOPHILS NFR BLD AUTO: 0.3 % — SIGNIFICANT CHANGE UP (ref 0–2)
BILIRUB SERPL-MCNC: 0.4 MG/DL — SIGNIFICANT CHANGE UP (ref 0.2–1.2)
BILIRUB UR-MCNC: NEGATIVE — SIGNIFICANT CHANGE UP
BLOOD GAS VENOUS COMPREHENSIVE RESULT: SIGNIFICANT CHANGE UP
BUN SERPL-MCNC: 31 MG/DL — HIGH (ref 7–23)
CALCIUM SERPL-MCNC: 9 MG/DL — SIGNIFICANT CHANGE UP (ref 8.4–10.5)
CHLORIDE SERPL-SCNC: 94 MMOL/L — LOW (ref 98–107)
CO2 SERPL-SCNC: 32 MMOL/L — HIGH (ref 22–31)
COLOR SPEC: SIGNIFICANT CHANGE UP
CREAT SERPL-MCNC: 3.07 MG/DL — HIGH (ref 0.5–1.3)
DIFF PNL FLD: ABNORMAL
EGFR: 21 ML/MIN/1.73M2 — LOW
EOSINOPHIL # BLD AUTO: 0.14 K/UL — SIGNIFICANT CHANGE UP (ref 0–0.5)
EOSINOPHIL NFR BLD AUTO: 1.4 % — SIGNIFICANT CHANGE UP (ref 0–6)
FLUAV AG NPH QL: SIGNIFICANT CHANGE UP
FLUBV AG NPH QL: SIGNIFICANT CHANGE UP
GLUCOSE SERPL-MCNC: 117 MG/DL — HIGH (ref 70–99)
GLUCOSE UR QL: NEGATIVE MG/DL — SIGNIFICANT CHANGE UP
HCT VFR BLD CALC: 41.2 % — SIGNIFICANT CHANGE UP (ref 39–50)
HGB BLD-MCNC: 12.5 G/DL — LOW (ref 13–17)
IANC: 8.98 K/UL — HIGH (ref 1.8–7.4)
IMM GRANULOCYTES NFR BLD AUTO: 0.5 % — SIGNIFICANT CHANGE UP (ref 0–0.9)
INR BLD: 1.05 RATIO — SIGNIFICANT CHANGE UP (ref 0.85–1.18)
KETONES UR-MCNC: NEGATIVE MG/DL — SIGNIFICANT CHANGE UP
LACTATE SERPL-SCNC: 1.4 MMOL/L — SIGNIFICANT CHANGE UP (ref 0.5–2)
LEUKOCYTE ESTERASE UR-ACNC: ABNORMAL
LYMPHOCYTES # BLD AUTO: 0.32 K/UL — LOW (ref 1–3.3)
LYMPHOCYTES # BLD AUTO: 3.2 % — LOW (ref 13–44)
MCHC RBC-ENTMCNC: 28 PG — SIGNIFICANT CHANGE UP (ref 27–34)
MCHC RBC-ENTMCNC: 30.3 GM/DL — LOW (ref 32–36)
MCV RBC AUTO: 92.4 FL — SIGNIFICANT CHANGE UP (ref 80–100)
MONOCYTES # BLD AUTO: 0.6 K/UL — SIGNIFICANT CHANGE UP (ref 0–0.9)
MONOCYTES NFR BLD AUTO: 5.9 % — SIGNIFICANT CHANGE UP (ref 2–14)
NEUTROPHILS # BLD AUTO: 8.98 K/UL — HIGH (ref 1.8–7.4)
NEUTROPHILS NFR BLD AUTO: 88.7 % — HIGH (ref 43–77)
NITRITE UR-MCNC: NEGATIVE — SIGNIFICANT CHANGE UP
NRBC # BLD: 0 /100 WBCS — SIGNIFICANT CHANGE UP (ref 0–0)
NRBC # FLD: 0 K/UL — SIGNIFICANT CHANGE UP (ref 0–0)
PH UR: 8 — SIGNIFICANT CHANGE UP (ref 5–8)
PLATELET # BLD AUTO: 189 K/UL — SIGNIFICANT CHANGE UP (ref 150–400)
POTASSIUM SERPL-MCNC: 5 MMOL/L — SIGNIFICANT CHANGE UP (ref 3.5–5.3)
POTASSIUM SERPL-SCNC: 5 MMOL/L — SIGNIFICANT CHANGE UP (ref 3.5–5.3)
PROT SERPL-MCNC: 8.1 G/DL — SIGNIFICANT CHANGE UP (ref 6–8.3)
PROT UR-MCNC: 300 MG/DL
PROTHROM AB SERPL-ACNC: 11.8 SEC — SIGNIFICANT CHANGE UP (ref 9.5–13)
RBC # BLD: 4.46 M/UL — SIGNIFICANT CHANGE UP (ref 4.2–5.8)
RBC # FLD: 20.1 % — HIGH (ref 10.3–14.5)
RSV RNA NPH QL NAA+NON-PROBE: SIGNIFICANT CHANGE UP
SARS-COV-2 RNA SPEC QL NAA+PROBE: SIGNIFICANT CHANGE UP
SODIUM SERPL-SCNC: 137 MMOL/L — SIGNIFICANT CHANGE UP (ref 135–145)
SP GR SPEC: 1.01 — SIGNIFICANT CHANGE UP (ref 1–1.03)
UROBILINOGEN FLD QL: 1 MG/DL — SIGNIFICANT CHANGE UP (ref 0.2–1)
WBC # BLD: 10.12 K/UL — SIGNIFICANT CHANGE UP (ref 3.8–10.5)
WBC # FLD AUTO: 10.12 K/UL — SIGNIFICANT CHANGE UP (ref 3.8–10.5)

## 2024-04-24 PROCEDURE — 99285 EMERGENCY DEPT VISIT HI MDM: CPT | Mod: 25

## 2024-04-24 PROCEDURE — 76937 US GUIDE VASCULAR ACCESS: CPT | Mod: 26

## 2024-04-24 PROCEDURE — 36000 PLACE NEEDLE IN VEIN: CPT

## 2024-04-24 PROCEDURE — 74176 CT ABD & PELVIS W/O CONTRAST: CPT | Mod: 26,MC

## 2024-04-24 RX ORDER — ONDANSETRON 8 MG/1
4 TABLET, FILM COATED ORAL ONCE
Refills: 0 | Status: COMPLETED | OUTPATIENT
Start: 2024-04-24 | End: 2024-04-24

## 2024-04-24 RX ORDER — SODIUM CHLORIDE 9 MG/ML
250 INJECTION INTRAMUSCULAR; INTRAVENOUS; SUBCUTANEOUS ONCE
Refills: 0 | Status: COMPLETED | OUTPATIENT
Start: 2024-04-24 | End: 2024-04-24

## 2024-04-24 RX ADMIN — ONDANSETRON 4 MILLIGRAM(S): 8 TABLET, FILM COATED ORAL at 23:11

## 2024-04-24 RX ADMIN — SODIUM CHLORIDE 250 MILLILITER(S): 9 INJECTION INTRAMUSCULAR; INTRAVENOUS; SUBCUTANEOUS at 23:21

## 2024-04-24 NOTE — ED ADULT NURSE NOTE - OBJECTIVE STATEMENT
Pt arrives a&ox1 from HD d/t vomiting. Pmh HTN, ESRD on HD (M/W/F) Pt arrives a&ox1 from HD d/t vomiting. Pmh HTN, ESRD on HD (M/W/F) R AVF. Pt arrives a&ox1 from HD d/t vomiting. Pmh HTN, ESRD on HD (M/W/F) R AVF. Pt received full sessions of HD today. Pt placed on cardiac monitor. NSR noted. Respirations even and unlabored. Rectal temp 101.1. Pt repeatedly shouting "Why". On assessment pt noted to have, right AVF, right BKA, left AKA, hypopigmentation to sacral area. Abdomen soft, nontender, nondistended. Safety precautions maintained. Stretcher in lowest position, wheels locked, appropriate side rails in place, call bell in reach.

## 2024-04-24 NOTE — ED PROVIDER NOTE - CLINICAL SUMMARY MEDICAL DECISION MAKING FREE TEXT BOX
Stephan PGY3:  69-year-old male with PMH end-stage renal on HD via left aVF, CVA, BKA/AKA, functionally quadriplegic, CAD, HTN, HLD, history of O2 as needed presents for evaluation of vomiting, feels warm on exam, screen for fever. May represent viral infection vs electrolyte abnormality vs intraabd pathology. Screen labs, lipase, CT a/p. Give small IVF back as appears clinically dehydrated and antiemetics. dispo based on results, likely admit

## 2024-04-24 NOTE — ED ADULT TRIAGE NOTE - TEMPERATURE IN FAHRENHEIT (DEGREES F)
Hospital called and an Bib Soham was required  Records were sent and a peer to peer is required  Dr Trent Doing can you please do this peer to peer for Dr Connie Iniguez    Phone number [phone]  Pend# [id number]    This is for tomorrow      Please let me know the outcome 98.2

## 2024-04-24 NOTE — ED PROVIDER NOTE - ATTENDING CONTRIBUTION TO CARE
69M h/o CVA, bedbound, has AKA/BKA, functional quadroplegic, recognizes name.  Pt on home oxygen.  From SNF.  Was at HD and started vomiting.  Brown vomitus on gown.  Abd nontender but distended.  Feels warm, danielle check rectal temp.  Found to be febrile.  CT done shows stercoral colitis.  Check NIKUNJ, give enema, rx abx, check cultures, rx small amt of fluids as pt ESRD, admit.    VS:  tachycardia, fever    GEN - moaning, malaise;   A+O x2  mild confusion likely delirium   HEAD - NC/AT     ENT - PEERL, EOMI, mucous membranes  dry , no discharge     Blind bilaterally  NECK: Neck supple, non-tender without lymphadenopathy, no masses, no JVD  PULM - CTA b/l,  symmetric breath sounds  COR -  normal heart sounds    ABD - , mild distension, NT, soft,  BACK - no CVA tenderness, nontender spine     EXTREMS - LUE AVF (+)thrill.  L BKA stump wnl.  R high AKA stump wnl.    SKIN - no rash    or bruising      NEUROLOGIC - alert, face symmetric, speech fluent, sensation nl, motor no focal deficit.

## 2024-04-24 NOTE — ED PROVIDER NOTE - PHYSICAL EXAMINATION
CONSTITUTIONAL: unwelling appearing, chronically ill, appears fatigued   SKIN: warm to touch  HEAD: NCAT  EYES: NL inspection  ENT: dry lips noted   NECK: Supple  CARD: RRR  RESP: CTAB  ABD: mildly distended but grossly soft, vomitus noted on shirt  EXT: R BKA/L AKA  NEURO: LE as above, UE able to move both limbs, exam limited but hx of CVA, functional quad at baseline; AAOx1 on exam

## 2024-04-24 NOTE — ED ADULT NURSE NOTE - NSFALLHARMRISKINTERV_ED_ALL_ED
Assistance OOB with selected safe patient handling equipment if applicable/Communicate risk of Fall with Harm to all staff, patient, and family/Provide visual cue: red socks, yellow wristband, yellow gown, etc/Reinforce activity limits and safety measures with patient and family/Bed in lowest position, wheels locked, appropriate side rails in place/Call bell, personal items and telephone in reach/Instruct patient to call for assistance before getting out of bed/chair/stretcher/Non-slip footwear applied when patient is off stretcher/[place] to call system/Physically safe environment - no spills, clutter or unnecessary equipment/Purposeful Proactive Rounding/Room/bathroom lighting operational, light cord in reach

## 2024-04-24 NOTE — ED PROVIDER NOTE - PROGRESS NOTE DETAILS
Stephan PGY3: CT results reviewed. large heart likely known issue. NIKUNJ with RN chaperone soft stool already coming out w/o needing disimpaction. Patient now diaphoretic moaning and dec blood pressure. 500cc additional IVF given, no further fluids given CT finding - would adv to levo after. Abd remains sof,t no further vomiting.

## 2024-04-24 NOTE — ED ADULT TRIAGE NOTE - CHIEF COMPLAINT QUOTE
Sent from dialysis for vomiting after treatment today. Received full treatment without any complications. Pt. A&Ox1 at baseline. Screaming in triage. Some type of clamp device noted to left upper arm by fistula.

## 2024-04-24 NOTE — ED PROVIDER NOTE - OBJECTIVE STATEMENT
69-year-old male with PMH end-stage renal on HD via left aVF, CVA, BKA/AKA, functionally quadriplegic, CAD, HTN, HLD, history of O2 as needed presents for evaluation of vomiting.  Brought from HD, started vomiting while receiving treatment.  Patient unable to endorse any specific complaints, per paperwork AO x 1/0 at baseline.  Patient denies chest pain or difficulty breathing, unable to really clarify about abdominal pain.    SNF: [facility] 69-year-old male with PMH end-stage renal on HD via left aVF, CVA, BKA/AKA, functionally quadriplegic, CAD, HTN, HLD, history of O2 as needed presents for evaluation of vomiting.  Brought from HD, started vomiting while receiving treatment.  Patient unable to endorse any specific complaints, per paperwork AO x 1/0 at baseline.  Patient denies chest pain or difficulty breathing, unable to really clarify about abdominal pain.    SNF: [facility]  Christianewilli Shipman [phone] (HCP, Rachid on chart is her father who has passed away)

## 2024-04-25 ENCOUNTER — NON-APPOINTMENT (OUTPATIENT)
Age: 69
End: 2024-04-25

## 2024-04-25 ENCOUNTER — APPOINTMENT (OUTPATIENT)
Dept: ELECTROPHYSIOLOGY | Facility: CLINIC | Age: 69
End: 2024-04-25
Payer: MEDICAID

## 2024-04-25 DIAGNOSIS — A41.9 SEPSIS, UNSPECIFIED ORGANISM: ICD-10-CM

## 2024-04-25 DIAGNOSIS — K52.89 OTHER SPECIFIED NONINFECTIVE GASTROENTERITIS AND COLITIS: ICD-10-CM

## 2024-04-25 DIAGNOSIS — I25.10 ATHEROSCLEROTIC HEART DISEASE OF NATIVE CORONARY ARTERY WITHOUT ANGINA PECTORIS: ICD-10-CM

## 2024-04-25 DIAGNOSIS — R11.2 NAUSEA WITH VOMITING, UNSPECIFIED: ICD-10-CM

## 2024-04-25 DIAGNOSIS — I63.9 CEREBRAL INFARCTION, UNSPECIFIED: ICD-10-CM

## 2024-04-25 DIAGNOSIS — N18.6 END STAGE RENAL DISEASE: ICD-10-CM

## 2024-04-25 DIAGNOSIS — Z29.9 ENCOUNTER FOR PROPHYLACTIC MEASURES, UNSPECIFIED: ICD-10-CM

## 2024-04-25 LAB
ADD ON TEST-SPECIMEN IN LAB: SIGNIFICANT CHANGE UP
ALBUMIN SERPL ELPH-MCNC: 2.8 G/DL — LOW (ref 3.3–5)
ALP SERPL-CCNC: 92 U/L — SIGNIFICANT CHANGE UP (ref 40–120)
ALT FLD-CCNC: 19 U/L — SIGNIFICANT CHANGE UP (ref 4–41)
ANION GAP SERPL CALC-SCNC: 15 MMOL/L — HIGH (ref 7–14)
AST SERPL-CCNC: 84 U/L — HIGH (ref 4–40)
BASOPHILS # BLD AUTO: 0.07 K/UL — SIGNIFICANT CHANGE UP (ref 0–0.2)
BASOPHILS NFR BLD AUTO: 0.6 % — SIGNIFICANT CHANGE UP (ref 0–2)
BILIRUB SERPL-MCNC: 0.6 MG/DL — SIGNIFICANT CHANGE UP (ref 0.2–1.2)
BUN SERPL-MCNC: 36 MG/DL — HIGH (ref 7–23)
CALCIUM SERPL-MCNC: 7.7 MG/DL — LOW (ref 8.4–10.5)
CHLORIDE SERPL-SCNC: 95 MMOL/L — LOW (ref 98–107)
CO2 SERPL-SCNC: 22 MMOL/L — SIGNIFICANT CHANGE UP (ref 22–31)
CREAT SERPL-MCNC: 3.31 MG/DL — HIGH (ref 0.5–1.3)
CULTURE RESULTS: NO GROWTH — SIGNIFICANT CHANGE UP
EGFR: 19 ML/MIN/1.73M2 — LOW
EOSINOPHIL # BLD AUTO: 0.44 K/UL — SIGNIFICANT CHANGE UP (ref 0–0.5)
EOSINOPHIL NFR BLD AUTO: 4 % — SIGNIFICANT CHANGE UP (ref 0–6)
GLUCOSE SERPL-MCNC: 68 MG/DL — LOW (ref 70–99)
GRAM STN FLD: ABNORMAL
GRAM STN FLD: ABNORMAL
HCT VFR BLD CALC: 41.9 % — SIGNIFICANT CHANGE UP (ref 39–50)
HGB BLD-MCNC: 12.5 G/DL — LOW (ref 13–17)
IANC: 9.2 K/UL — HIGH (ref 1.8–7.4)
IMM GRANULOCYTES NFR BLD AUTO: 0.3 % — SIGNIFICANT CHANGE UP (ref 0–0.9)
LACTATE SERPL-SCNC: 1.5 MMOL/L — SIGNIFICANT CHANGE UP (ref 0.5–2)
LYMPHOCYTES # BLD AUTO: 0.51 K/UL — LOW (ref 1–3.3)
LYMPHOCYTES # BLD AUTO: 4.6 % — LOW (ref 13–44)
MCHC RBC-ENTMCNC: 27.5 PG — SIGNIFICANT CHANGE UP (ref 27–34)
MCHC RBC-ENTMCNC: 29.8 GM/DL — LOW (ref 32–36)
MCV RBC AUTO: 92.3 FL — SIGNIFICANT CHANGE UP (ref 80–100)
METHOD TYPE: SIGNIFICANT CHANGE UP
MONOCYTES # BLD AUTO: 0.81 K/UL — SIGNIFICANT CHANGE UP (ref 0–0.9)
MONOCYTES NFR BLD AUTO: 7.3 % — SIGNIFICANT CHANGE UP (ref 2–14)
MRSA SPEC QL CULT: SIGNIFICANT CHANGE UP
NEUTROPHILS # BLD AUTO: 9.2 K/UL — HIGH (ref 1.8–7.4)
NEUTROPHILS NFR BLD AUTO: 83.2 % — HIGH (ref 43–77)
NRBC # BLD: 0 /100 WBCS — SIGNIFICANT CHANGE UP (ref 0–0)
NRBC # FLD: 0 K/UL — SIGNIFICANT CHANGE UP (ref 0–0)
PLATELET # BLD AUTO: 183 K/UL — SIGNIFICANT CHANGE UP (ref 150–400)
POTASSIUM SERPL-MCNC: SIGNIFICANT CHANGE UP MMOL/L (ref 3.5–5.3)
POTASSIUM SERPL-SCNC: SIGNIFICANT CHANGE UP MMOL/L (ref 3.5–5.3)
PROCALCITONIN SERPL-MCNC: 0.9 NG/ML — HIGH (ref 0.02–0.1)
PROT SERPL-MCNC: 7.7 G/DL — SIGNIFICANT CHANGE UP (ref 6–8.3)
RBC # BLD: 4.54 M/UL — SIGNIFICANT CHANGE UP (ref 4.2–5.8)
RBC # FLD: 19.9 % — HIGH (ref 10.3–14.5)
SODIUM SERPL-SCNC: 132 MMOL/L — LOW (ref 135–145)
SPECIMEN SOURCE: SIGNIFICANT CHANGE UP
TROPONIN T, HIGH SENSITIVITY RESULT: 355 NG/L — CRITICAL HIGH
WBC # BLD: 11.06 K/UL — HIGH (ref 3.8–10.5)
WBC # FLD AUTO: 11.06 K/UL — HIGH (ref 3.8–10.5)

## 2024-04-25 PROCEDURE — 99253 IP/OBS CNSLTJ NEW/EST LOW 45: CPT

## 2024-04-25 PROCEDURE — 99223 1ST HOSP IP/OBS HIGH 75: CPT

## 2024-04-25 PROCEDURE — 93298 REM INTERROG DEV EVAL SCRMS: CPT

## 2024-04-25 RX ORDER — VANCOMYCIN HCL 1 G
1000 VIAL (EA) INTRAVENOUS ONCE
Refills: 0 | Status: COMPLETED | OUTPATIENT
Start: 2024-04-25 | End: 2024-04-25

## 2024-04-25 RX ORDER — LACTOBACILLUS ACIDOPHILUS 100MM CELL
1 CAPSULE ORAL DAILY
Refills: 0 | Status: DISCONTINUED | OUTPATIENT
Start: 2024-04-25 | End: 2024-05-16

## 2024-04-25 RX ORDER — SENNA PLUS 8.6 MG/1
2 TABLET ORAL AT BEDTIME
Refills: 0 | Status: DISCONTINUED | OUTPATIENT
Start: 2024-04-25 | End: 2024-05-16

## 2024-04-25 RX ORDER — CALCIUM ACETATE 667 MG
667 TABLET ORAL
Refills: 0 | Status: DISCONTINUED | OUTPATIENT
Start: 2024-04-25 | End: 2024-04-26

## 2024-04-25 RX ORDER — ACETAMINOPHEN 500 MG
650 TABLET ORAL EVERY 6 HOURS
Refills: 0 | Status: DISCONTINUED | OUTPATIENT
Start: 2024-04-25 | End: 2024-05-16

## 2024-04-25 RX ORDER — HEPARIN SODIUM 5000 [USP'U]/ML
5000 INJECTION INTRAVENOUS; SUBCUTANEOUS EVERY 12 HOURS
Refills: 0 | Status: DISCONTINUED | OUTPATIENT
Start: 2024-04-25 | End: 2024-05-16

## 2024-04-25 RX ORDER — LACTULOSE 10 G/15ML
10 SOLUTION ORAL DAILY
Refills: 0 | Status: DISCONTINUED | OUTPATIENT
Start: 2024-04-25 | End: 2024-05-16

## 2024-04-25 RX ORDER — ATORVASTATIN CALCIUM 80 MG/1
40 TABLET, FILM COATED ORAL AT BEDTIME
Refills: 0 | Status: DISCONTINUED | OUTPATIENT
Start: 2024-04-25 | End: 2024-05-16

## 2024-04-25 RX ORDER — OXYCODONE HYDROCHLORIDE 5 MG/1
5 TABLET ORAL EVERY 6 HOURS
Refills: 0 | Status: DISCONTINUED | OUTPATIENT
Start: 2024-04-25 | End: 2024-05-02

## 2024-04-25 RX ORDER — PIPERACILLIN AND TAZOBACTAM 4; .5 G/20ML; G/20ML
3.38 INJECTION, POWDER, LYOPHILIZED, FOR SOLUTION INTRAVENOUS ONCE
Refills: 0 | Status: COMPLETED | OUTPATIENT
Start: 2024-04-25 | End: 2024-04-25

## 2024-04-25 RX ORDER — SODIUM CHLORIDE 9 MG/ML
500 INJECTION INTRAMUSCULAR; INTRAVENOUS; SUBCUTANEOUS ONCE
Refills: 0 | Status: COMPLETED | OUTPATIENT
Start: 2024-04-25 | End: 2024-04-25

## 2024-04-25 RX ORDER — LANOLIN ALCOHOL/MO/W.PET/CERES
3 CREAM (GRAM) TOPICAL AT BEDTIME
Refills: 0 | Status: DISCONTINUED | OUTPATIENT
Start: 2024-04-25 | End: 2024-05-16

## 2024-04-25 RX ORDER — PANTOPRAZOLE SODIUM 20 MG/1
40 TABLET, DELAYED RELEASE ORAL
Refills: 0 | Status: DISCONTINUED | OUTPATIENT
Start: 2024-04-25 | End: 2024-05-16

## 2024-04-25 RX ORDER — ACETAMINOPHEN 500 MG
1000 TABLET ORAL ONCE
Refills: 0 | Status: COMPLETED | OUTPATIENT
Start: 2024-04-25 | End: 2024-04-25

## 2024-04-25 RX ORDER — FUROSEMIDE 40 MG
40 TABLET ORAL
Refills: 0 | Status: DISCONTINUED | OUTPATIENT
Start: 2024-04-25 | End: 2024-05-01

## 2024-04-25 RX ORDER — ONDANSETRON 8 MG/1
4 TABLET, FILM COATED ORAL EVERY 8 HOURS
Refills: 0 | Status: DISCONTINUED | OUTPATIENT
Start: 2024-04-25 | End: 2024-05-16

## 2024-04-25 RX ORDER — ASPIRIN/CALCIUM CARB/MAGNESIUM 324 MG
81 TABLET ORAL DAILY
Refills: 0 | Status: DISCONTINUED | OUTPATIENT
Start: 2024-04-25 | End: 2024-04-26

## 2024-04-25 RX ORDER — MIDODRINE HYDROCHLORIDE 2.5 MG/1
5 TABLET ORAL
Refills: 0 | Status: DISCONTINUED | OUTPATIENT
Start: 2024-04-25 | End: 2024-05-01

## 2024-04-25 RX ORDER — PIPERACILLIN AND TAZOBACTAM 4; .5 G/20ML; G/20ML
3.38 INJECTION, POWDER, LYOPHILIZED, FOR SOLUTION INTRAVENOUS EVERY 12 HOURS
Refills: 0 | Status: DISCONTINUED | OUTPATIENT
Start: 2024-04-25 | End: 2024-04-26

## 2024-04-25 RX ADMIN — SODIUM CHLORIDE 500 MILLILITER(S): 9 INJECTION INTRAMUSCULAR; INTRAVENOUS; SUBCUTANEOUS at 01:28

## 2024-04-25 RX ADMIN — SENNA PLUS 2 TABLET(S): 8.6 TABLET ORAL at 22:19

## 2024-04-25 RX ADMIN — Medication 650 MILLIGRAM(S): at 23:38

## 2024-04-25 RX ADMIN — Medication 250 MILLIGRAM(S): at 01:48

## 2024-04-25 RX ADMIN — Medication 667 MILLIGRAM(S): at 17:44

## 2024-04-25 RX ADMIN — ATORVASTATIN CALCIUM 40 MILLIGRAM(S): 80 TABLET, FILM COATED ORAL at 22:19

## 2024-04-25 RX ADMIN — Medication 40 MILLIGRAM(S): at 14:41

## 2024-04-25 RX ADMIN — Medication 3 MILLIGRAM(S): at 22:28

## 2024-04-25 RX ADMIN — HEPARIN SODIUM 5000 UNIT(S): 5000 INJECTION INTRAVENOUS; SUBCUTANEOUS at 17:45

## 2024-04-25 RX ADMIN — PIPERACILLIN AND TAZOBACTAM 200 GRAM(S): 4; .5 INJECTION, POWDER, LYOPHILIZED, FOR SOLUTION INTRAVENOUS at 00:58

## 2024-04-25 RX ADMIN — Medication 650 MILLIGRAM(S): at 22:28

## 2024-04-25 RX ADMIN — Medication 400 MILLIGRAM(S): at 00:28

## 2024-04-25 RX ADMIN — PIPERACILLIN AND TAZOBACTAM 25 GRAM(S): 4; .5 INJECTION, POWDER, LYOPHILIZED, FOR SOLUTION INTRAVENOUS at 14:35

## 2024-04-25 NOTE — H&P ADULT - PROBLEM SELECTOR PLAN 4
- Continue with Atorvastatin 40mg qhs   - Continue with ASA 81mg qd  - Cardiology consulted for abnormal appearing myocardium as recommended by rads    # Elevated Troponin, likely demand   - Troponin 384, 355  - Likely demand has been elevated in the past

## 2024-04-25 NOTE — H&P ADULT - HISTORY OF PRESENT ILLNESS
69 year old M with PMH ESRD on HD via left AVF MWF with Dr. Nino Vital, CVA, BKA/AKA, functionally quadriplegic, CAD, HTN, HLD, history of O2 as needed presents for evaluation of vomiting.  Per Ed notes pt was brought in from HD, started vomiting while receiving treatment.  Pt report generalized pain but no chest pain, abdominal pain, headache. Per notes pt is AO x 1/0 at baseline. Unable to gather complete HPI d/t pt dementia.

## 2024-04-25 NOTE — CONSULT NOTE ADULT - ASSESSMENT
69 year old man with history of ESRD on HD MWF, CAD, prior CVA, HTN, HLD, L AKA, R BKA presenting to hospital due to vomiting during HD. Febrile on presentation with positive UA on admission. CT A/P with findings concerning for stercoral colitis, LV dilatation and ?lower PNA.    #Sepsis  #Elevated troponin, LV dilatation  - Patient without complaint of active chest pain at this time  - Febrile on admission with positive UA, stercoral colitis, ?PNA, elevated lactate concerning for overall septic picture  - Troponin 384 on admission followed by 355. CK not elevated  - EKG appears similar to 10/2023  - Lower concern for NSTEMI at this time  - Elevated troponin likely in setting of acute illness  - Prior TTE 6/2023 showing mildly decreased global LV systolic function of 40-45%. LV size not commented on  - LV dilatation could represent worsening of heart failure versus stress cardiomyopathy versus possible ischemia given known CAD history  - Received HD today, doesn't appear overloaded on exam, has trace crackles at the bases    Recommendations:  - Repeat TTE for further characterization of LV  - Troponin peaked and came down. Can stop trending  - Monitor for chest pain  - Continue ASA, statin  - Monitor on tele      **Recommendations preliminary until attested to by attending**

## 2024-04-25 NOTE — H&P ADULT - ASSESSMENT
69 year old M with PMH ESRD on HD via left AVF MWF with Dr. Nino Vital, CVA, BKA/AKA, functionally quadriplegic, CAD, HTN, HLD, history of O2 as needed presents for evaluation of vomiting.  Pt admitted for sepsis 2/2 colitis

## 2024-04-25 NOTE — CONSULT NOTE ADULT - SUBJECTIVE AND OBJECTIVE BOX
COLORECTAL SURGERY CONSULT NOTE  --------------------------------------------------------------------------------------------    Patient is a 69y old  Male who presents with a chief complaint of vomiting     HPI: 69-year-old male with PMH end-stage renal on HD via left aVF, CVA, BKA/AKA, functionally quadriplegic, CAD, HTN, HLD, presented from HD center.  Unable to obtain subjective complaints 2/2 mental status, awake but moaning incomprehensibly. Surgery consulted for concerns of developing stercoral colitis, patient unable to answer questions about nausea vomiting or last bowel movement. Unknown if ever had c-scope. Remainder of data per chart review.     ROS: 10-system review is otherwise negative except HPI above.      PAST MEDICAL & SURGICAL HISTORY:  DM (diabetes mellitus)      HTN (hypertension)      Below knee amputation status, right      CKD (chronic kidney disease)      MRSA (methicillin resistant Staphylococcus aureus) colonization  (hx/o MRSA growth from wound culture)      Wound  (chronic wounds to left foot and leg)      DM (diabetes mellitus)      HTN (hypertension)      Kidney disease      S/P BKA (below knee amputation) unilateral, right      H/O peripheral artery bypass  left fem to below-knee pop with RSVG      Amputated left leg  above knee      Amputated right leg  below knee        FAMILY HISTORY:  Family history of diabetes mellitus        SOCIAL HISTORY:      ALLERGIES: No Known Allergies      HOME MEDICATIONS:     CURRENT MEDICATIONS  MEDICATIONS (STANDING):   MEDICATIONS (PRN):  --------------------------------------------------------------------------------------------    Vitals:   T(C): 36.8 (24 @ 01:25), Max: 38.4 (24 @ 20:33)  HR: 77 (24 @ 01:49) (77 - 118)  BP: 103/66 (24 @ 01:49) (66/42 - 134/78)  RR: 16 (24 @ 01:49) (16 - 20)  SpO2: 100% (24 @ 01:49) (86% - 100%)  CAPILLARY BLOOD GLUCOSE      POCT Blood Glucose.: 121 mg/dL (2024 01:17)    PHYSICAL EXAM:   General: chronically ill appearing and cachectic   Neuro: moaning, not oriented to person place or date   Cardio: rrr blood pressure MAP 66 on cuff, avf with thrill   Resp: Good effort  GI/Abd: Soft, minimally distended  Rectal: fecaloma, disimpacted, loose stool evacuating after manual disimpaction   Musculoskeletal: R BKA L AKA   --------------------------------------------------------------------------------------------    LABS  CBC ( 22:)                              12.5<L>                         10.12   )----------------(  189        88.7<H>% Neutrophils, 3.2<L>% Lymphocytes, ANC: 8.98<H>                              41.2      BMP ( 22:02)             137     |  94<L>   |  31<H> 		Ca++ --      Ca 9.0                ---------------------------------( 117<H>		Mg --                 5.0     |  32<H>   |  3.07<H>			Ph --        LFTs ( 22:02)      TPro 8.1 / Alb 3.5 / TBili 0.4 / DBili -- / AST 41<H> / ALT 19 / AlkPhos 107    Coags ( 22:02)  aPTT 24.2<L> / INR 1.05 / PT 11.8      ABG ( @ 01:08)      /  /  /  /  / %     Lactate:  1.5  ABG ( @ 22:02)      /  /  /  /  / %     Lactate:  1.4    VBG ( @ 22:31)     7.40 / 62<H> / 29 / 38<H> / 11.0<H> / 43.8<L>%     Lactate: 2.2<H>    --------------------------------------------------------------------------------------------    MICROBIOLOGY  Urinalysis ( @ 22:22):     Color: Dark Yellow / Appearance: Turbid<!> / S.015 / pH: 8.0 / Gluc: Negative / Ketones: Negative / Bili: Negative / Urobili: 1.0 / Protein :300<!> / Nitrites: Negative / Leuk.Est: Large<!> / RBC: 667<H> / WBC: 440<H> / Sq Epi:  / Non Sq Epi: 0 / Bacteria Moderate<!>         --------------------------------------------------------------------------------------------    IMAGING    < from: CT Abdomen and Pelvis No Cont (24 @ 23:08) >  PROCEDURE:  CT of the Abdomen and Pelvis was performed.  Sagittal and coronal reformats were performed.    FINDINGS:  LOWER CHEST: Moderate bilateral pleural effusions, left greater than   right. Segmental/subsegmental areas of versus consolidation within the   lung bases may represent atelectasis; superimposed pneumonia is not   excluded. Additionally, there are diffuse round was interstitial   densities throughout the aerated portions of both lung bases with   evidence of interlobular septal thickening; a degree of pulmonary edema   is suspected. The heart is markedly enlarged with significant dilatation   of the left ventricle; worrisome findings which need further assessment   and characterization by the cardiology service. Extensive calcified   atherosclerosis noted. No sizable pericardial effusion.    LIVER: Within normal limits.  BILE DUCTS: Normal caliber.  GALLBLADDER: Within normal limits.  SPLEEN: Within normal limits.  PANCREAS: Within normal limits.  ADRENALS: Within normal limits.  KIDNEYS/URETERS: Within normal limits.    BLADDER: Within normal limits.  REPRODUCTIVE ORGANS: Prostate is enlarged.    BOWEL: No bowel obstruction. Appendixis normal. Moderate-to-severe   constipation. Large desiccated stool ball cyst within the upper rectum.   There is diffuse rectal wall thickening extending distally towards the   anal rectal junction with mild mesorectal inflammatory stranding;   predominant in the presacral region.  PERITONEUM: No ascites.  VESSELS: Extensive calcified atherosclerosis.  RETROPERITONEUM/LYMPH NODES: No lymphadenopathy.  ABDOMINAL WALL: Within normal limits.  BONES: Diffuse osseous demineralization. Degenerative changes.    IMPRESSION:  1.  Constipation with a large stool ball impacted within the upper rectum   which shows marked thickening of its wall with associated mesorectal   inflammatory stranding. These findings are worrisome for developing   stercoralcolitis in the setting of pressure induced ischemia. Surgical   evaluation is advised.  2.  Numerous lower thoracic findings may be further assessed and   characterized with dedicated CT chest.  3.  Abnormal appearance of the myocardium; cardiology evaluation is   advised.    < end of copied text >

## 2024-04-25 NOTE — CONSULT NOTE ADULT - ASSESSMENT
69-year-old male with PMH end-stage renal on HD via left aVF, CVA, BKA/AKA, functionally quadriplegic, CAD, HTN, HLD, history of O2 as needed presents for evaluation of vomiting.  Brought from HD, started vomiting while receiving treatment.  Patient unable to endorse any specific complaints, per paperwork AO x 1/0 at baseline.  Patient denies chest pain or difficulty breathing, unable to really clarify about abdominal pain. Nephrology consulted for dialysis needs.    A/P   ESRD  Center: [facility]  Nephrologist: Dr. Navarro   Access: L AVF  MWF schedule   Consent obtained from niece, and proxy, Ramonita Vincent via phone [phone]  Renal diet  Continue HD as scheduled   Monitor BMP     HTN   Acceptable  UF with HD   Monitor BP     Anemia  Above goal   Monitor Hb     CKD-MBD  Get PO4, PTH   Monitor daily     Nausea/Vomiting  CT A/P shows constipation and wall thickening, worrisome for stercoral colitis  Colorectal surgery f/u

## 2024-04-25 NOTE — H&P ADULT - NSHPPHYSICALEXAM_GEN_ALL_CORE
Vital Signs Last 24 Hrs  T(C): 37 (25 Apr 2024 09:28), Max: 38.4 (24 Apr 2024 20:33)  T(F): 98.6 (25 Apr 2024 09:28), Max: 101.1 (24 Apr 2024 20:33)  HR: 88 (25 Apr 2024 09:28) (65 - 118)  BP: 135/74 (25 Apr 2024 09:28) (66/42 - 135/74)  BP(mean): 79 (25 Apr 2024 01:49) (79 - 79)  RR: 22 (25 Apr 2024 09:28) (15 - 22)  SpO2: 100% (25 Apr 2024 09:28) (86% - 100%)    Parameters below as of 25 Apr 2024 09:28  Patient On (Oxygen Delivery Method): room air        CONSTITUTIONAL: Well-groomed, in no apparent distress  EYES: No conjunctival or scleral injection, non-icteric;   ENMT: No external nasal lesions; MMM  NECK: Trachea midline without palpable neck mass; thyroid not enlarged and non-tender  RESPIRATORY: Breathing comfortably; no dullness to percussion; lungs CTA without wheeze/rhonchi/rales  CARDIOVASCULAR: +S1S2, RRR, no M/G/R; pedal pulses full and symmetric; no lower extremity edema  GASTROINTESTINAL: No palpable masses or tenderness, +BS throughout, no rebound/guarding; no hepatosplenomegaly; no hernia palpated  LYMPHATIC: No cervical LAD or tenderness  SKIN: No rashes or ulcers noted  NEUROLOGIC: CN II-XII intact; sensation intact in LEs b/l to light touch  PSYCHIATRIC: A+O x 3; mood and affect appropriate; appropriate insight and judgment Vital Signs Last 24 Hrs  T(C): 37 (25 Apr 2024 09:28), Max: 38.4 (24 Apr 2024 20:33)  T(F): 98.6 (25 Apr 2024 09:28), Max: 101.1 (24 Apr 2024 20:33)  HR: 88 (25 Apr 2024 09:28) (65 - 118)  BP: 135/74 (25 Apr 2024 09:28) (66/42 - 135/74)  BP(mean): 79 (25 Apr 2024 01:49) (79 - 79)  RR: 22 (25 Apr 2024 09:28) (15 - 22)  SpO2: 100% (25 Apr 2024 09:28) (86% - 100%)    Parameters below as of 25 Apr 2024 09:28  Patient On (Oxygen Delivery Method): room air        CONSTITUTIONAL: Well-groomed, in no apparent distress  EYES: No conjunctival or scleral injection, non-icteric;   ENMT: No external nasal lesions; MMM  NECK: Trachea midline   RESPIRATORY: Breathing comfortably; lungs CTA without wheeze/rhonchi/rales  CARDIOVASCULAR: +S1S2, RRR, systolic murmur  GASTROINTESTINAL: No palpable masses or tenderness, +BS throughout,  SKIN: No rashes noted  NEUROLOGIC: sensation intact in LEs b/l to light touch  PSYCHIATRIC: Alert oriented x0  MSK:  R BKA L AKA

## 2024-04-25 NOTE — ED ADULT NURSE REASSESSMENT NOTE - NS ED NURSE REASSESS COMMENT FT1
Pt noted to be diaphoretic. . MD Rothman notified. Rectal temp performed. 500ml bolus administered per order. EKG in progress
break coverage rn. received report from MARCO villareal. pt A&Ox0, vitals improved post receiving some of IVF bolus. vancomycin hung. colorectal surgery team at bedside for deimpaction due to pt's constipation. NSR on monitor satting 100% on 5L NC. BP improved at this time pt maintaining MAP >65 MD Calix made aware no micu consult warranted at this time. noted to have stage 1 sacral ulcer. L AKA, R BKA noted. pt diaphoretic MD also aware. gurgling cough noted.
Pt going to [facility]. Report given to MARCO Quispe. Pt stable for transport

## 2024-04-25 NOTE — CONSULT NOTE ADULT - ASSESSMENT
Assessment: 69-year-old male with PMH end-stage renal on HD via left aVF, CVA, BKA/AKA, functionally quadriplegic, CAD, HTN, HLD, with fecal impaction on CT scan and hypotension/UTI.     Recs:  - no emergent surgical intervention  - disimpacted in ED and having loose stool behind disimpacted firm stool ball  - aggressive bowel regiment  - care and dispo per ED/primary   - To be d/w attending     Lenny Calderon MD, PGY3  A Team Surgery   [id number] Assessment: 69-year-old male with PMH end-stage renal on HD via left aVF, CVA, BKA/AKA, functionally quadriplegic, CAD, HTN, HLD, with fecal impaction on CT scan and hypotension.     Recs:  - no emergent surgical intervention  - disimpacted in ED and having loose stool behind disimpacted firm stool ball  - aggressive bowel regiment  - care and dispo per ED/primary   - To be d/w attending     Lenny Calderon MD, PGY3  A Team Surgery   [id number] Assessment: 69-year-old male with PMH end-stage renal on HD via left aVF, CVA, BKA/AKA, functionally quadriplegic, CAD, HTN, HLD, with fecal impaction on CT scan and hypotension.     Recs:  - no emergent surgical intervention  - disimpacted in ED and having loose stool behind disimpacted firm stool ball  - aggressive bowel regimen  - care and dispo per ED/primary   - To be d/w attending     Lenny Calderon MD, PGY3  A Team Surgery   [id number]

## 2024-04-25 NOTE — H&P ADULT - TIME BILLING
- Ordering, reviewing, and interpreting labs, testing, and imaging.  - Independently obtaining a review of systems and performing a physical exam  - Reviewing consultant documentation  - Counselling and educating  family regarding interpretation of aforementioned items and plan of care.

## 2024-04-25 NOTE — H&P ADULT - PROBLEM SELECTOR PLAN 1
-likely 2/2 stercoral colitis / UTI?/?PNA  -HR 95 T: 101.1F  -BC and UC pending   -UA Large leuk est, mod bacteria  - Fluid bolus 750ml in ED  -CT Abdomen: Constipation with a large stool ball impacted within the upper rectum  which shows marked thickening of its wall with associated mesorectal inflammatory stranding. These findings are worrisome for developing   stercoral colitis in the setting of pressure induced ischemia. Surgical evaluation is advised. Numerous lower thoracic findings may be further assessed and characterized with dedicated CT chest. - ?PNA pleural effusions.  Abnormal appearance of the myocardium  - CT chest without IV contrast pending   - Procal 0.90  - Manual disimpaction in ED, with soft stools thereafter  - Continue with lactulose 10g qd and senna 2 tabs qd, can add MIRALAX if needed   - Continue with Zosyn BID renally dosed

## 2024-04-25 NOTE — H&P ADULT - NSHPLABSRESULTS_GEN_ALL_CORE
12.5   11.06 )-----------( 183      ( 25 Apr 2024 12:15 )             41.9       04-25    x   |  x   |  36<H>  ----------------------------<  68<L>  x    |  22  |  3.31<H>    Ca    7.7<L>      25 Apr 2024 12:15    TPro  7.7  /  Alb  2.8<L>  /  TBili  0.6  /  DBili  x   /  AST  84<H>  /  ALT  19  /  AlkPhos  92  04-25      PT/INR - ( 24 Apr 2024 22:02 )   PT: 11.8 sec;   INR: 1.05 ratio         PTT - ( 24 Apr 2024 22:02 )  PTT:24.2 sec    CARDIAC MARKERS ( 24 Apr 2024 22:02 )  x     / x     / 64 U/L / x     / 1.3 ng/mL          Troponin T, High Sensitivity Result: 355 ng/L (04-25-24 @ 02:11)  Troponin T, High Sensitivity Result: 384 ng/L (04-24-24 @ 22:02)

## 2024-04-25 NOTE — CONSULT NOTE ADULT - SUBJECTIVE AND OBJECTIVE BOX
Patient seen and evaluated at bedside    Chief Complaint:    HPI:  69 year old M with PMH ESRD on HD via left AVF MWF with Dr. Nino Vital, CVA, BKA/AKA, functionally quadriplegic, CAD, HTN, HLD, history of O2 as needed presents for evaluation of vomiting.  Per Ed notes pt was brought in from HD, started vomiting while receiving treatment.  Pt report generalized pain but no chest pain, abdominal pain, headache. Per notes pt is AO x 1/0 at baseline. Unable to gather complete HPI d/t pt dementia.  (25 Apr 2024 13:48)    Patient reported to have had LV dilatation on imaging. Cardiology consulted in setting of this. When seen at bedside patient limited in his responses to questions. When asked if he had any pain he stated "you tell me". When directly asked about chest pain he said "not now", but did not elaborate when asked when he had chest pain. He denied having shortness of breath. Per nursing patient had episode of emesis after being given meds, and was reported to have had emesis while getting HD. Patient denied swelling in areas of his body.      PMHx:   DM (diabetes mellitus)    HTN (hypertension)    Kidney disease    Below knee amputation status, right    CKD (chronic kidney disease)    MRSA (methicillin resistant Staphylococcus aureus) colonization    Wound    No pertinent past medical history    DM (diabetes mellitus)    HTN (hypertension)    Kidney disease        PSHx:   No significant past surgical history    S/P BKA (below knee amputation) unilateral, right    H/O peripheral artery bypass    No significant past surgical history    Amputated left leg    Amputated right leg        Allergies:  No Known Allergies      Home Meds:    Current Medications:   acetaminophen     Tablet .. 650 milliGRAM(s) Oral every 6 hours PRN  aluminum hydroxide/magnesium hydroxide/simethicone Suspension 30 milliLiter(s) Oral every 4 hours PRN  aspirin  chewable 81 milliGRAM(s) Oral daily  atorvastatin 40 milliGRAM(s) Oral at bedtime  calcium acetate 667 milliGRAM(s) Oral three times a day with meals  furosemide    Tablet 40 milliGRAM(s) Oral two times a day  heparin   Injectable 5000 Unit(s) SubCutaneous every 12 hours  lactobacillus acidophilus 1 Tablet(s) Oral daily  lactulose Syrup 10 Gram(s) Oral daily  melatonin 3 milliGRAM(s) Oral at bedtime PRN  midodrine. 5 milliGRAM(s) Oral <User Schedule>  ondansetron Injectable 4 milliGRAM(s) IV Push every 8 hours PRN  oxyCODONE    IR 5 milliGRAM(s) Oral every 6 hours PRN  pantoprazole    Tablet 40 milliGRAM(s) Oral before breakfast  piperacillin/tazobactam IVPB.. 3.375 Gram(s) IV Intermittent every 12 hours  senna 2 Tablet(s) Oral at bedtime      FAMILY HISTORY:  Family history of diabetes mellitus        Social History:  Smoking History: Never smoker  Alcohol Use: Denied      REVIEW OF SYSTEMS:  CONSTITUTIONAL: No weakness, fevers or chills  EYES/ENT: No visual changes;  No dysphagia  NECK: No pain or stiffness  RESPIRATORY: No cough, wheezing, hemoptysis; No shortness of breath  CARDIOVASCULAR: No chest pain or palpitations; No lower extremity edema  GASTROINTESTINAL: No abdominal or epigastric pain. No nausea, vomiting, or hematemesis; No diarrhea or constipation. No melena or hematochezia.  BACK: No back pain  GENITOURINARY: No dysuria, frequency or hematuria  NEUROLOGICAL: No numbness or weakness  SKIN: No itching, burning, rashes, or lesions   All other review of systems is negative unless indicated above.    Physical Exam:  T(F): 98.6 (04-25), Max: 101.1 (04-24)  HR: 88 (04-25) (65 - 118)  BP: 135/74 (04-25) (66/42 - 135/74)  RR: 22 (04-25)  SpO2: 100% (04-25)      CONSTITUTIONAL: NAD  HEENT: Moist oral mucosa  RESPIRATORY: Normal respiratory effort, lungs with trace lower lobe crackles on auscultation bilaterally  CARDIOVASCULAR: Regular rate and rhythm, normal S1 and S2, holosystolic murmur at left sternal border appreciated, peripheral pulses are palpable bilaterally in upper extremities  ABDOMEN: Soft, nondistended, nontender to palpation, normoactive bowel sounds, no rebound/guarding  EXTREMITIES: L AKA, R BKA  PSYCH: A+O to name and location only  NEUROLOGY: Facial expression symmetric  SKIN: No rashes, no palpable lesions    Cardiovascular Diagnostic Testing:    ECG: Personally reviewed: Sinus rhythm with normal axis. ST segment in V1, TWI in V3, V4 all appear similar to EKG from October 2023    Echo:   < from: TTE Echo Complete w/o Contrast w/ Doppler (06.23.23 @ 10:32) >  Summary:   1. Mildly decreased global left ventricular systolic function.   2. Left ventricular ejection fraction, by visual estimation, is 40 to   45%.   3. Spectral Doppler shows restrictive pattern of left ventricular   myocardial filling (Grade III diastolic dysfunction).   4. Mild to moderately enlarged left atrium.   5. Structurally normal mitral valve, with normal leaflet excursion.   6. Mild mitral valve regurgitation.   7. Mild aortic valve stenosis.   8. Mildly reduced RV systolic function.   9. Structurally normal tricuspid valve, with normal leaflet excursion.  10. Moderate tricuspid regurgitation.  11. Structurally normal pulmonic valve, with normal leaflet excursion.  12. Estimated pulmonary artery systolic pressure is 56.1 mmHg assuming a   right atrial pressure of 5 mmHg, which is consistent with moderate   pulmonary hypertension.  13. Large pleural effusion in the left lateral region.    < end of copied text >      Imaging:    < from: CT Abdomen and Pelvis No Cont (04.24.24 @ 23:08) >  FINDINGS:  LOWER CHEST: Moderate bilateral pleural effusions, left greater than   right. Segmental/subsegmental areas of versus consolidation within the   lung bases may represent atelectasis; superimposed pneumonia is not   excluded. Additionally, there are diffuse round was interstitial   densities throughout the aerated portions of both lung bases with   evidence of interlobular septal thickening; a degree of pulmonary edema   is suspected. The heart is markedly enlarged with significant dilatation   of the left ventricle; worrisome findings which need further assessment   and characterization by the cardiology service. Extensive calcified   atherosclerosis noted. No sizable pericardial effusion.    LIVER: Within normal limits.  BILE DUCTS: Normal caliber.  GALLBLADDER: Within normal limits.  SPLEEN: Within normal limits.  PANCREAS: Within normal limits.  ADRENALS: Within normal limits.  KIDNEYS/URETERS: Within normal limits.    BLADDER: Within normal limits.  REPRODUCTIVE ORGANS: Prostate is enlarged.    BOWEL: No bowel obstruction. Appendixis normal. Moderate-to-severe   constipation. Large desiccated stool ball cyst within the upper rectum.   There is diffuse rectal wall thickening extending distally towards the   anal rectal junction with mild mesorectal inflammatory stranding;   predominant in the presacral region.  PERITONEUM: No ascites.  VESSELS: Extensive calcified atherosclerosis.  RETROPERITONEUM/LYMPH NODES: No lymphadenopathy.  ABDOMINAL WALL: Within normal limits.  BONES: Diffuse osseous demineralization. Degenerative changes.    IMPRESSION:  1.  Constipation with a large stool ball impacted within the upper rectum   which shows marked thickening of its wall with associated mesorectal   inflammatory stranding. These findings are worrisome for developing   stercoralcolitis in the setting of pressure induced ischemia. Surgical   evaluation is advised.  2.  Numerous lower thoracic findings may be further assessed and   characterized with dedicated CT chest.  3.  Abnormal appearance of the myocardium; cardiology evaluation is   advised.        --- End of Report ---    < end of copied text >    Labs: Personally reviewed                        12.5   11.06 )-----------( 183      ( 25 Apr 2024 12:15 )             41.9     04-25    132<L>  |  95<L>  |  36<H>  ----------------------------<  68<L>  TNP   |  22  |  3.31<H>    Ca    7.7<L>      25 Apr 2024 12:15    TPro  7.7  /  Alb  2.8<L>  /  TBili  0.6  /  DBili  x   /  AST  84<H>  /  ALT  19  /  AlkPhos  92  04-25    PT/INR - ( 24 Apr 2024 22:02 )   PT: 11.8 sec;   INR: 1.05 ratio         PTT - ( 24 Apr 2024 22:02 )  PTT:24.2 sec    CARDIAC MARKERS ( 25 Apr 2024 02:11 )  355 ng/L / x     / x     / x     / x     / x      CARDIAC MARKERS ( 24 Apr 2024 22:02 )  384 ng/L / x     / x     / 64 U/L / x     / 1.3 ng/mL

## 2024-04-25 NOTE — PATIENT PROFILE ADULT - FALL HARM RISK - RISK INTERVENTIONS

## 2024-04-25 NOTE — PATIENT PROFILE ADULT - HAVE YOU RECENTLY LOST WEIGHT WITHOUT TRYING?
Clair was given my contact number by her CNM, Stefanie Crabtree to review her quad screen results in the context of noted fetal EIF on comprehensive ultrasound in [facility] on 1/23/17.      Clair Neha Gonzalez MS [facility]  Certified Genetic Counselor  Maternal Fetal Medicine  [facility]  Voice Mail:  [phone]  E-Mail:  [email or web address]  Pager:  [phone]    Unsure (2)

## 2024-04-25 NOTE — H&P ADULT - PROBLEM SELECTOR PLAN 2
Lasix 40 mg bid with parameters  HD MWF   L AVF  Nephrology following   Midodrine 5mg with HD  Calcium acetate tid  Hyponatremia likely chronic from history review

## 2024-04-25 NOTE — H&P ADULT - PROBLEM SELECTOR PLAN 3
Continue ASA statin  Niece notes pt has hx of CVAs resulting ins ome dysphagia and diet was initially  pureed nectar - upgraded ot soft foods OP  Passed dysphagia screen in hospital

## 2024-04-25 NOTE — ED PROCEDURE NOTE - PROCEDURE ADDITIONAL DETAILS
Peripheral IV access in the Emergency Department obtained under dynamic ultrasound guidance with dark nonpulsatile blood return.  Catheter was flushed afterwards without any resistance or resulting extravasation.  IV catheter confirmed in compressible vein after insertion. [18] gauge catheter placed in a peripheral vein in the [right/] upper extremity.

## 2024-04-25 NOTE — CONSULT NOTE ADULT - SUBJECTIVE AND OBJECTIVE BOX
[facility]   MD MARIBELL CARRION MD MARIA SANTIAGO, NP        TEL:  OFFICE: [phone]  From 5pm-7am answering service [phone]    --- INITIAL RENAL CONSULT NOTE ---date of service 24 @ 13:29    HPI:  69-year-old male with PMH end-stage renal on HD via left aVF, CVA, BKA/AKA, functionally quadriplegic, CAD, HTN, HLD, history of O2 as needed presents for evaluation of vomiting.  Brought from HD, started vomiting while receiving treatment.  Patient unable to endorse any specific complaints, per paperwork AO x 1/0 at baseline.  Patient denies chest pain or difficulty breathing, unable to really clarify about abdominal pain. Nephrology consulted for dialysis needs.          Allergies:  No Known Allergies      PAST MEDICAL & SURGICAL HISTORY:  DM (diabetes mellitus)      HTN (hypertension)      Below knee amputation status, right      CKD (chronic kidney disease)      MRSA (methicillin resistant Staphylococcus aureus) colonization  (hx/o MRSA growth from wound culture)      Wound  (chronic wounds to left foot and leg)      DM (diabetes mellitus)      HTN (hypertension)      Kidney disease      S/P BKA (below knee amputation) unilateral, right      H/O peripheral artery bypass  left fem to below-knee pop with RSVG      Amputated left leg  above knee      Amputated right leg  below knee          Home Medications Reviewed    Hospital Medications:   MEDICATIONS  (STANDING):  heparin   Injectable 5000 Unit(s) SubCutaneous every 12 hours      SOCIAL HISTORY:  Denies ETOh, Smoking,     FAMILY HISTORY:  Family history of diabetes mellitus        REVIEW OF SYSTEMS:  CONSTITUTIONAL: No weakness, fevers or chills  EYES/ENT: No visual changes;  No vertigo or throat pain   NECK: No pain or stiffness  RESPIRATORY: No cough, wheezing, hemoptysis; No shortness of breath  CARDIOVASCULAR: No chest pain or palpitations.  GASTROINTESTINAL: as per HPI   GENITOURINARY: No dysuria, frequency, foamy urine, urinary urgency, incontinence or hematuria  NEUROLOGICAL: as per HPI   SKIN: No itching, burning, rashes, or lesions   VASCULAR: as per HPI   All other review of systems is negative unless indicated above.    VITALS:  T(F): 98.6 (24 @ 09:28), Max: 101.1 (24 @ 20:33)  HR: 88 (24 @ 09:28)  BP: 135/74 (24 @ 09:28)  RR: 22 (24 @ 09:28)  SpO2: 100% (24 @ 09:28)  Wt(kg): --        PHYSICAL EXAM:  General: NAD  HEENT: anicteric sclera, oropharynx clear, MMM  Neck: No JVD  Respiratory: CTAB, no wheezes, rales or rhonchi  Cardiovascular: S1, S2, RRR  Gastrointestinal: BS+, soft, NT/ND  Extremities: L AKA, R BKA   Neurological: A/O x 1, no focal deficits  Psychiatric: Normal mood, normal affect  : No CVA tenderness. No cervantes.   Skin: No rashes  Vascular Access: L AVF     LABS:      137  |  94<L>  |  31<H>  ----------------------------<  117<H>  5.0   |  32<H>  |  3.07<H>    Ca    9.0      2024 22:02    TPro  8.1  /  Alb  3.5  /  TBili  0.4  /  DBili      /  AST  41<H>  /  ALT  19  /  AlkPhos  107      Creatinine Trend: 3.07 <--                        12.5   11.06 )-----------( 183      ( 2024 12:15 )             41.9     Urine Studies:  Urinalysis Basic - ( 2024 22:22 )    Color: Dark Yellow / Appearance: Turbid / S.015 / pH:   Gluc:  / Ketone: Negative mg/dL  / Bili: Negative / Urobili: 1.0 mg/dL   Blood:  / Protein: 300 mg/dL / Nitrite: Negative   Leuk Esterase: Large / RBC: 667 /HPF /  /HPF   Sq Epi:  / Non Sq Epi: 0 /HPF / Bacteria: Moderate /HPF          RADIOLOGY & ADDITIONAL STUDIES:

## 2024-04-26 ENCOUNTER — RESULT REVIEW (OUTPATIENT)
Age: 69
End: 2024-04-26

## 2024-04-26 DIAGNOSIS — K52.89 OTHER SPECIFIED NONINFECTIVE GASTROENTERITIS AND COLITIS: ICD-10-CM

## 2024-04-26 LAB
ANION GAP SERPL CALC-SCNC: 16 MMOL/L — HIGH (ref 7–14)
BASOPHILS # BLD AUTO: 0.13 K/UL — SIGNIFICANT CHANGE UP (ref 0–0.2)
BASOPHILS NFR BLD AUTO: 1.3 % — SIGNIFICANT CHANGE UP (ref 0–2)
BUN SERPL-MCNC: 48 MG/DL — HIGH (ref 7–23)
CALCIUM SERPL-MCNC: 9 MG/DL — SIGNIFICANT CHANGE UP (ref 8.4–10.5)
CHLORIDE SERPL-SCNC: 91 MMOL/L — LOW (ref 98–107)
CO2 SERPL-SCNC: 27 MMOL/L — SIGNIFICANT CHANGE UP (ref 22–31)
CREAT SERPL-MCNC: 4.83 MG/DL — HIGH (ref 0.5–1.3)
DIALYSIS INSTRUMENT RESULT - HEPATITIS B SURFACE ANTIGEN: NEGATIVE — SIGNIFICANT CHANGE UP
EGFR: 12 ML/MIN/1.73M2 — LOW
EOSINOPHIL # BLD AUTO: 0.89 K/UL — HIGH (ref 0–0.5)
EOSINOPHIL NFR BLD AUTO: 8.8 % — HIGH (ref 0–6)
GLUCOSE BLDC GLUCOMTR-MCNC: 102 MG/DL — HIGH (ref 70–99)
GLUCOSE BLDC GLUCOMTR-MCNC: 78 MG/DL — SIGNIFICANT CHANGE UP (ref 70–99)
GLUCOSE BLDC GLUCOMTR-MCNC: 84 MG/DL — SIGNIFICANT CHANGE UP (ref 70–99)
GLUCOSE BLDC GLUCOMTR-MCNC: 94 MG/DL — SIGNIFICANT CHANGE UP (ref 70–99)
GLUCOSE SERPL-MCNC: 50 MG/DL — CRITICAL LOW (ref 70–99)
GRAM STN FLD: ABNORMAL
GRAM STN FLD: ABNORMAL
HCT VFR BLD CALC: 43.2 % — SIGNIFICANT CHANGE UP (ref 39–50)
HGB BLD-MCNC: 12.6 G/DL — LOW (ref 13–17)
IANC: 7.64 K/UL — HIGH (ref 1.8–7.4)
IMM GRANULOCYTES NFR BLD AUTO: 0.4 % — SIGNIFICANT CHANGE UP (ref 0–0.9)
LYMPHOCYTES # BLD AUTO: 0.77 K/UL — LOW (ref 1–3.3)
LYMPHOCYTES # BLD AUTO: 7.6 % — LOW (ref 13–44)
MAGNESIUM SERPL-MCNC: 4.6 MG/DL — HIGH (ref 1.6–2.6)
MCHC RBC-ENTMCNC: 27 PG — SIGNIFICANT CHANGE UP (ref 27–34)
MCHC RBC-ENTMCNC: 29.2 GM/DL — LOW (ref 32–36)
MCV RBC AUTO: 92.5 FL — SIGNIFICANT CHANGE UP (ref 80–100)
MONOCYTES # BLD AUTO: 0.66 K/UL — SIGNIFICANT CHANGE UP (ref 0–0.9)
MONOCYTES NFR BLD AUTO: 6.5 % — SIGNIFICANT CHANGE UP (ref 2–14)
NEUTROPHILS # BLD AUTO: 7.64 K/UL — HIGH (ref 1.8–7.4)
NEUTROPHILS NFR BLD AUTO: 75.4 % — SIGNIFICANT CHANGE UP (ref 43–77)
NRBC # BLD: 0 /100 WBCS — SIGNIFICANT CHANGE UP (ref 0–0)
NRBC # FLD: 0 K/UL — SIGNIFICANT CHANGE UP (ref 0–0)
PHOSPHATE SERPL-MCNC: 1.5 MG/DL — LOW (ref 2.5–4.5)
PLATELET # BLD AUTO: 163 K/UL — SIGNIFICANT CHANGE UP (ref 150–400)
POTASSIUM SERPL-MCNC: 5.3 MMOL/L — SIGNIFICANT CHANGE UP (ref 3.5–5.3)
POTASSIUM SERPL-SCNC: 5.3 MMOL/L — SIGNIFICANT CHANGE UP (ref 3.5–5.3)
RBC # BLD: 4.67 M/UL — SIGNIFICANT CHANGE UP (ref 4.2–5.8)
RBC # FLD: 20 % — HIGH (ref 10.3–14.5)
SODIUM SERPL-SCNC: 134 MMOL/L — LOW (ref 135–145)
VANCOMYCIN FLD-MCNC: 13.6 UG/ML — SIGNIFICANT CHANGE UP
VANCOMYCIN TROUGH SERPL-MCNC: 6.6 UG/ML — LOW (ref 10–20)
WBC # BLD: 10.13 K/UL — SIGNIFICANT CHANGE UP (ref 3.8–10.5)
WBC # FLD AUTO: 10.13 K/UL — SIGNIFICANT CHANGE UP (ref 3.8–10.5)

## 2024-04-26 PROCEDURE — 93306 TTE W/DOPPLER COMPLETE: CPT | Mod: 26

## 2024-04-26 PROCEDURE — 99255 IP/OBS CONSLTJ NEW/EST HI 80: CPT | Mod: GC

## 2024-04-26 PROCEDURE — 99233 SBSQ HOSP IP/OBS HIGH 50: CPT

## 2024-04-26 PROCEDURE — 71250 CT THORAX DX C-: CPT | Mod: 26

## 2024-04-26 PROCEDURE — 93931 UPPER EXTREMITY STUDY: CPT | Mod: 26,LT

## 2024-04-26 PROCEDURE — 99222 1ST HOSP IP/OBS MODERATE 55: CPT | Mod: GC

## 2024-04-26 RX ORDER — POLYETHYLENE GLYCOL 3350 17 G/17G
17 POWDER, FOR SOLUTION ORAL DAILY
Refills: 0 | Status: DISCONTINUED | OUTPATIENT
Start: 2024-04-26 | End: 2024-05-16

## 2024-04-26 RX ORDER — CHLORHEXIDINE GLUCONATE 213 G/1000ML
1 SOLUTION TOPICAL DAILY
Refills: 0 | Status: DISCONTINUED | OUTPATIENT
Start: 2024-04-26 | End: 2024-05-16

## 2024-04-26 RX ORDER — ASCORBIC ACID 60 MG
500 TABLET,CHEWABLE ORAL DAILY
Refills: 0 | Status: DISCONTINUED | OUTPATIENT
Start: 2024-04-26 | End: 2024-05-16

## 2024-04-26 RX ORDER — SODIUM CHLORIDE 9 MG/ML
100 INJECTION INTRAMUSCULAR; INTRAVENOUS; SUBCUTANEOUS
Refills: 0 | Status: DISCONTINUED | OUTPATIENT
Start: 2024-04-26 | End: 2024-05-16

## 2024-04-26 RX ORDER — ASPIRIN/CALCIUM CARB/MAGNESIUM 324 MG
81 TABLET ORAL DAILY
Refills: 0 | Status: DISCONTINUED | OUTPATIENT
Start: 2024-04-26 | End: 2024-05-16

## 2024-04-26 RX ORDER — VANCOMYCIN HCL 1 G
1000 VIAL (EA) INTRAVENOUS ONCE
Refills: 0 | Status: COMPLETED | OUTPATIENT
Start: 2024-04-26 | End: 2024-04-26

## 2024-04-26 RX ADMIN — Medication 40 MILLIGRAM(S): at 06:11

## 2024-04-26 RX ADMIN — LACTULOSE 10 GRAM(S): 10 SOLUTION ORAL at 11:07

## 2024-04-26 RX ADMIN — SENNA PLUS 2 TABLET(S): 8.6 TABLET ORAL at 22:30

## 2024-04-26 RX ADMIN — ATORVASTATIN CALCIUM 40 MILLIGRAM(S): 80 TABLET, FILM COATED ORAL at 22:30

## 2024-04-26 RX ADMIN — Medication 40 MILLIGRAM(S): at 22:29

## 2024-04-26 RX ADMIN — HEPARIN SODIUM 5000 UNIT(S): 5000 INJECTION INTRAVENOUS; SUBCUTANEOUS at 06:11

## 2024-04-26 RX ADMIN — Medication 3 MILLIGRAM(S): at 22:28

## 2024-04-26 RX ADMIN — HEPARIN SODIUM 5000 UNIT(S): 5000 INJECTION INTRAVENOUS; SUBCUTANEOUS at 22:29

## 2024-04-26 RX ADMIN — Medication 63.75 MILLIMOLE(S): at 12:17

## 2024-04-26 RX ADMIN — MIDODRINE HYDROCHLORIDE 5 MILLIGRAM(S): 2.5 TABLET ORAL at 11:12

## 2024-04-26 RX ADMIN — PIPERACILLIN AND TAZOBACTAM 25 GRAM(S): 4; .5 INJECTION, POWDER, LYOPHILIZED, FOR SOLUTION INTRAVENOUS at 06:11

## 2024-04-26 RX ADMIN — Medication 1 TABLET(S): at 11:06

## 2024-04-26 RX ADMIN — Medication 667 MILLIGRAM(S): at 12:17

## 2024-04-26 RX ADMIN — PANTOPRAZOLE SODIUM 40 MILLIGRAM(S): 20 TABLET, DELAYED RELEASE ORAL at 06:11

## 2024-04-26 RX ADMIN — Medication 250 MILLIGRAM(S): at 22:53

## 2024-04-26 RX ADMIN — Medication 667 MILLIGRAM(S): at 08:08

## 2024-04-26 RX ADMIN — Medication 81 MILLIGRAM(S): at 11:06

## 2024-04-26 NOTE — DIETITIAN INITIAL EVALUATION ADULT - PERTINENT MEDS FT
MEDICATIONS  (STANDING):  aspirin  chewable 81 milliGRAM(s) Oral daily  atorvastatin 40 milliGRAM(s) Oral at bedtime  calcium acetate 667 milliGRAM(s) Oral three times a day with meals  furosemide    Tablet 40 milliGRAM(s) Oral two times a day  heparin   Injectable 5000 Unit(s) SubCutaneous every 12 hours  lactobacillus acidophilus 1 Tablet(s) Oral daily  lactulose Syrup 10 Gram(s) Oral daily  midodrine. 5 milliGRAM(s) Oral <User Schedule>  pantoprazole    Tablet 40 milliGRAM(s) Oral before breakfast  senna 2 Tablet(s) Oral at bedtime    MEDICATIONS  (PRN):  acetaminophen     Tablet .. 650 milliGRAM(s) Oral every 6 hours PRN Temp greater or equal to 38C (100.4F), Mild Pain (1 - 3)  aluminum hydroxide/magnesium hydroxide/simethicone Suspension 30 milliLiter(s) Oral every 4 hours PRN Dyspepsia  melatonin 3 milliGRAM(s) Oral at bedtime PRN Insomnia  ondansetron Injectable 4 milliGRAM(s) IV Push every 8 hours PRN Nausea and/or Vomiting  oxyCODONE    IR 5 milliGRAM(s) Oral every 6 hours PRN moderate to severe pain  sodium chloride 0.9% Bolus. 100 milliLiter(s) IV Bolus every 5 minutes PRN SBP LESS THAN or EQUAL to 80 mmHg

## 2024-04-26 NOTE — DIETITIAN INITIAL EVALUATION ADULT - ORAL NUTRITION SUPPLEMENTS
Will provide Mighty Shake 1x daily (220kcal, 6gm pro per each serving) and Magic Cup 2x daily (580kcal, 18gm pro) from kitchen to provide optimal nutrition.

## 2024-04-26 NOTE — PROGRESS NOTE ADULT - ASSESSMENT
69 year old M with PMH ESRD on HD via left AVF MWF, CVA, BKA/AKA, functionally quadriplegic, CAD, HTN, HLD, history of O2 as needed presents for evaluation of vomiting.  Pt admitted for sepsis 2/2 colitis.

## 2024-04-26 NOTE — CONSULT NOTE ADULT - SUBJECTIVE AND OBJECTIVE BOX
Patient is a 69y old  Male who presents with a chief complaint of nausea and vomiting (26 Apr 2024 08:22)    HPI:  69M with ESRD on HD via left AVF MWF, CVA, BKA/AKA, functionally quadriplegic, CAD, HTN, HLD, brought in from HD center after having nausea and vomiting, ID consulted for assistance.    Found to be febrile 101F, on RA  WBC 11  Cr 4.8  UA with 400WBC, 600 RBC, 0SQE, UCx negative  COVID negative  BCx 4/24 with MRSA  CTAP noncontrast with moderate stool burden concerning for stercoral colitis       prior hospital charts reviewed [  ]  primary team notes reviewed [  ]  other consultant notes reviewed [  ]    PAST MEDICAL & SURGICAL HISTORY:  DM (diabetes mellitus)      HTN (hypertension)      Below knee amputation status, right      CKD (chronic kidney disease)      MRSA (methicillin resistant Staphylococcus aureus) colonization  (hx/o MRSA growth from wound culture)      Wound  (chronic wounds to left foot and leg)      DM (diabetes mellitus)      HTN (hypertension)      Kidney disease      S/P BKA (below knee amputation) unilateral, right      H/O peripheral artery bypass  left fem to below-knee pop with RSVG      Amputated left leg  above knee      Amputated right leg  below knee          Allergies  No Known Allergies    ANTIMICROBIALS (past 90 days)  MEDICATIONS  (STANDING):  piperacillin/tazobactam IVPB..   25 mL/Hr IV Intermittent (04-26-24 @ 06:11)   25 mL/Hr IV Intermittent (04-25-24 @ 14:35)    piperacillin/tazobactam IVPB...   200 mL/Hr IV Intermittent (04-25-24 @ 00:58)    vancomycin  IVPB.   250 mL/Hr IV Intermittent (04-25-24 @ 01:48)        piperacillin/tazobactam IVPB.. 3.375 every 12 hours    MEDICATIONS  (STANDING):  acetaminophen     Tablet .. 650 every 6 hours PRN  aluminum hydroxide/magnesium hydroxide/simethicone Suspension 30 every 4 hours PRN  aspirin  chewable 81 daily  atorvastatin 40 at bedtime  furosemide    Tablet 40 two times a day  heparin   Injectable 5000 every 12 hours  lactulose Syrup 10 daily  melatonin 3 at bedtime PRN  midodrine. 5 <User Schedule>  ondansetron Injectable 4 every 8 hours PRN  oxyCODONE    IR 5 every 6 hours PRN  pantoprazole    Tablet 40 before breakfast  senna 2 at bedtime    SOCIAL HISTORY:       FAMILY HISTORY:  Family history of diabetes mellitus      REVIEW OF SYSTEMS  [  ] ROS unobtainable because:    [  ] All other systems negative except as noted below:	    Constitutional:  [ ] fever [ ] chills  [ ] weight loss  [ ] weakness  Skin:  [ ] rash [ ] phlebitis	  Eyes: [ ] icterus [ ] pain  [ ] discharge	  ENMT: [ ] sore throat  [ ] thrush [ ] ulcers [ ] exudates  Respiratory: [ ] dyspnea [ ] hemoptysis [ ] cough [ ] sputum	  Cardiovascular:  [ ] chest pain [ ] palpitations [ ] edema	  Gastrointestinal:  [ ] nausea [ ] vomiting [ ] diarrhea [ ] constipation [ ] pain	  Genitourinary:  [ ] dysuria [ ] frequency [ ] hematuria [ ] discharge [ ] flank pain  [ ] incontinence  Musculoskeletal:  [ ] myalgias [ ] arthralgias [ ] arthritis  [ ] back pain  Neurological:  [ ] headache [ ] seizures  [ ] confusion/altered mental status  Psychiatric:  [ ] anxiety [ ] depression	  Hematology/Lymphatics:  [ ] lymphadenopathy  Endocrine:  [ ] adrenal [ ] thyroid  Allergic/Immunologic:	 [ ] transplant [ ] seasonal    Vital Signs Last 24 Hrs  T(F): 98.8 (04-26-24 @ 05:30), Max: 101.1 (04-24-24 @ 20:33)  Vital Signs Last 24 Hrs  HR: 76 (04-26-24 @ 05:30) (76 - 93)  BP: 110/61 (04-26-24 @ 05:30) (110/61 - 135/74)  RR: 18 (04-26-24 @ 05:30)  SpO2: 99% (04-26-24 @ 05:30) (98% - 100%)  Wt(kg): --    PHYSICAL EXAM:                              12.6   10.13 )-----------( 163      ( 26 Apr 2024 05:15 )             43.2   04-26    134<L>  |  91<L>  |  48<H>  ----------------------------<  50<LL>  5.3   |  27  |  4.83<H>    Ca    9.0      26 Apr 2024 05:15  Phos  1.5     04-26  Mg     4.60     04-26    TPro  7.7  /  Alb  2.8<L>  /  TBili  0.6  /  DBili  x   /  AST  84<H>  /  ALT  19  /  AlkPhos  92  04-25    Urinalysis Basic - ( 26 Apr 2024 05:15 )    Color: x / Appearance: x / SG: x / pH: x  Gluc: 50 mg/dL / Ketone: x  / Bili: x / Urobili: x   Blood: x / Protein: x / Nitrite: x   Leuk Esterase: x / RBC: x / WBC x   Sq Epi: x / Non Sq Epi: x / Bacteria: x    MICROBIOLOGY:  Culture - Urine (collected 24 Apr 2024 22:22)  Source: Clean Catch Clean Catch (Midstream)  Final Report (25 Apr 2024 23:00):    No growth    Culture - Blood (collected 24 Apr 2024 22:02)  Source: .Blood Blood-Peripheral  Gram Stain (26 Apr 2024 05:49):    Growth in aerobic bottle: Gram Positive Cocci in Clusters    Growth in anaerobic bottle: Gram Positive Cocci in Clusters  Preliminary Report (26 Apr 2024 05:49):    Growth in aerobic bottle: Gram Positive Cocci in Clusters    Growth in anaerobic bottle: Gram Positive Cocci in Clusters    Culture - Blood (collected 24 Apr 2024 21:58)  Source: .Blood Blood-Peripheral  Gram Stain (25 Apr 2024 21:05):    Growth in aerobic bottle: Gram Positive Cocci in Clusters  Preliminary Report (25 Apr 2024 21:05):    Growth in aerobic bottle: Gram Positive Cocci in Clusters    Direct identification is available within approximately 3-5    hours either by Blood Panel Multiplexed PCR or Direct    MALDI-TOF. Details: [email or web address]  Organism: Blood Culture PCR (25 Apr 2024 23:07)  Organism: Blood Culture PCR (25 Apr 2024 23:07)      Method Type: PCR      -  Methicillin resistant Staphylococcus aureus (MRSA): Detec                  RADIOLOGY:  imaging below personally reviewed and agree with findings  < from: CT Abdomen and Pelvis No Cont (04.24.24 @ 23:08) >  IMPRESSION:  1.  Constipation with a large stool ball impacted within the upper rectum   which shows marked thickening of its wall with associated mesorectal   inflammatory stranding. These findings are worrisome for developing   stercoralcolitis in the setting of pressure induced ischemia. Surgical   evaluation is advised.  2.  Numerous lower thoracic findings may be further assessed and   characterized with dedicated CT chest.  3.  Abnormal appearance of the myocardium; cardiology evaluation is   advised.    < end of copied text >   Patient is a 69y old  Male who presents with a chief complaint of nausea and vomiting (26 Apr 2024 08:22)    HPI:  69M with ESRD on HD via left AVF MWF, CVA, BKA/AKA, functionally quadriplegic, CAD, HTN, HLD, brought in from HD center after having nausea and vomiting, ID consulted for assistance.  Found to be febrile 101F, on RA  WBC 11  Cr 4.8  UA with 400WBC, 600 RBC, 0SQE, UCx negative  COVID negative  BCx 4/24 with MRSA  CTAP noncontrast with moderate stool burden concerning for stercoral colitis     prior hospital charts reviewed [ x ]  primary team notes reviewed [ x ]  other consultant notes reviewed [ x ]    PAST MEDICAL & SURGICAL HISTORY:  DM (diabetes mellitus)      HTN (hypertension)      Below knee amputation status, right      CKD (chronic kidney disease)      MRSA (methicillin resistant Staphylococcus aureus) colonization  (hx/o MRSA growth from wound culture)      Wound  (chronic wounds to left foot and leg)      DM (diabetes mellitus)      HTN (hypertension)      Kidney disease      S/P BKA (below knee amputation) unilateral, right      H/O peripheral artery bypass  left fem to below-knee pop with RSVG      Amputated left leg  above knee      Amputated right leg  below knee          Allergies  No Known Allergies    ANTIMICROBIALS (past 90 days)  MEDICATIONS  (STANDING):  piperacillin/tazobactam IVPB..   25 mL/Hr IV Intermittent (04-26-24 @ 06:11)   25 mL/Hr IV Intermittent (04-25-24 @ 14:35)    piperacillin/tazobactam IVPB...   200 mL/Hr IV Intermittent (04-25-24 @ 00:58)    vancomycin  IVPB.   250 mL/Hr IV Intermittent (04-25-24 @ 01:48)        piperacillin/tazobactam IVPB.. 3.375 every 12 hours    MEDICATIONS  (STANDING):  acetaminophen     Tablet .. 650 every 6 hours PRN  aluminum hydroxide/magnesium hydroxide/simethicone Suspension 30 every 4 hours PRN  aspirin  chewable 81 daily  atorvastatin 40 at bedtime  furosemide    Tablet 40 two times a day  heparin   Injectable 5000 every 12 hours  lactulose Syrup 10 daily  melatonin 3 at bedtime PRN  midodrine. 5 <User Schedule>  ondansetron Injectable 4 every 8 hours PRN  oxyCODONE    IR 5 every 6 hours PRN  pantoprazole    Tablet 40 before breakfast  senna 2 at bedtime    SOCIAL HISTORY:  Denies alcohol, tobacco, recreational drug use     FAMILY HISTORY:  Family history of diabetes mellitus      REVIEW OF SYSTEMS  [  ] ROS unobtainable because:    [x  ] All other systems negative except as noted below:	    Constitutional:  [ ] fever [ ] chills  [ ] weight loss  [ ] weakness  Skin:  [ ] rash [ ] phlebitis	  Eyes: [ ] icterus [ ] pain  [ ] discharge	  ENMT: [ ] sore throat  [ ] thrush [ ] ulcers [ ] exudates  Respiratory: [ ] dyspnea [ ] hemoptysis [ ] cough [ ] sputum	  Cardiovascular:  [ ] chest pain [ ] palpitations [ ] edema	  Gastrointestinal:  [ ] nausea [ ] vomiting [ ] diarrhea [ ] constipation [ ] pain	  Genitourinary:  [ ] dysuria [ ] frequency [ ] hematuria [ ] discharge [ ] flank pain  [ ] incontinence  Musculoskeletal:  [ ] myalgias [ ] arthralgias [ ] arthritis  [ ] back pain  Neurological:  [ ] headache [ ] seizures  [ ] confusion/altered mental status  Psychiatric:  [ ] anxiety [ ] depression	  Hematology/Lymphatics:  [ ] lymphadenopathy  Endocrine:  [ ] adrenal [ ] thyroid  Allergic/Immunologic:	 [ ] transplant [ ] seasonal    Vital Signs Last 24 Hrs  T(F): 98.8 (04-26-24 @ 05:30), Max: 101.1 (04-24-24 @ 20:33)  Vital Signs Last 24 Hrs  HR: 76 (04-26-24 @ 05:30) (76 - 93)  BP: 110/61 (04-26-24 @ 05:30) (110/61 - 135/74)  RR: 18 (04-26-24 @ 05:30)  SpO2: 99% (04-26-24 @ 05:30) (98% - 100%)  Wt(kg): --    Physical Exam:  Constitutional:  well preserved, comfortable  Head/Eyes: no icterus  ENT:  supple, no cervical lymphadenopathy   LUNGS:  CTA  CVS:  regular rhythm, no murmur  Abd:  soft, non-tender; non-distended  Ext:  R BKA and L AKA  Vascular: L AVF nonTTP, no erythema, no drainage  MSK:  joints without swelling  Neuro: AAO X 3, non- focal                              12.6   10.13 )-----------( 163      ( 26 Apr 2024 05:15 )             43.2   04-26    134<L>  |  91<L>  |  48<H>  ----------------------------<  50<LL>  5.3   |  27  |  4.83<H>    Ca    9.0      26 Apr 2024 05:15  Phos  1.5     04-26  Mg     4.60     04-26    TPro  7.7  /  Alb  2.8<L>  /  TBili  0.6  /  DBili  x   /  AST  84<H>  /  ALT  19  /  AlkPhos  92  04-25    Urinalysis Basic - ( 26 Apr 2024 05:15 )    Color: x / Appearance: x / SG: x / pH: x  Gluc: 50 mg/dL / Ketone: x  / Bili: x / Urobili: x   Blood: x / Protein: x / Nitrite: x   Leuk Esterase: x / RBC: x / WBC x   Sq Epi: x / Non Sq Epi: x / Bacteria: x    MICROBIOLOGY:  Culture - Urine (collected 24 Apr 2024 22:22)  Source: Clean Catch Clean Catch (Midstream)  Final Report (25 Apr 2024 23:00):    No growth    Culture - Blood (collected 24 Apr 2024 22:02)  Source: .Blood Blood-Peripheral  Gram Stain (26 Apr 2024 05:49):    Growth in aerobic bottle: Gram Positive Cocci in Clusters    Growth in anaerobic bottle: Gram Positive Cocci in Clusters  Preliminary Report (26 Apr 2024 05:49):    Growth in aerobic bottle: Gram Positive Cocci in Clusters    Growth in anaerobic bottle: Gram Positive Cocci in Clusters    Culture - Blood (collected 24 Apr 2024 21:58)  Source: .Blood Blood-Peripheral  Gram Stain (25 Apr 2024 21:05):    Growth in aerobic bottle: Gram Positive Cocci in Clusters  Preliminary Report (25 Apr 2024 21:05):    Growth in aerobic bottle: Gram Positive Cocci in Clusters    Direct identification is available within approximately 3-5    hours either by Blood Panel Multiplexed PCR or Direct    MALDI-TOF. Details: [email or web address]  Organism: Blood Culture PCR (25 Apr 2024 23:07)  Organism: Blood Culture PCR (25 Apr 2024 23:07)      Method Type: PCR      -  Methicillin resistant Staphylococcus aureus (MRSA): Detec                  RADIOLOGY:  imaging below personally reviewed and agree with findings  < from: CT Abdomen and Pelvis No Cont (04.24.24 @ 23:08) >  IMPRESSION:  1.  Constipation with a large stool ball impacted within the upper rectum   which shows marked thickening of its wall with associated mesorectal   inflammatory stranding. These findings are worrisome for developing   stercoralcolitis in the setting of pressure induced ischemia. Surgical   evaluation is advised.  2.  Numerous lower thoracic findings may be further assessed and   characterized with dedicated CT chest.  3.  Abnormal appearance of the myocardium; cardiology evaluation is   advised.    < end of copied text >   Patient is a 69y old  Male who presents with a chief complaint of nausea and vomiting (26 Apr 2024 08:22)    HPI:  69M with ESRD on HD via left AVF MWF, CVA, BKA/AKA, functionally quadriplegic, CAD, HTN, HLD, brought in from HD center after having nausea and vomiting, ID consulted for assistance.  Found to be febrile 101F, on RA  WBC 11  Cr 4.8  UA with 400WBC, 600 RBC, 0SQE, UCx negative  COVID negative  BCx 4/24 with MRSA  CTAP noncontrast with moderate stool burden concerning for stercoral colitis     prior hospital charts reviewed [ x ]  primary team notes reviewed [ x ]  other consultant notes reviewed [ x ]    PAST MEDICAL & SURGICAL HISTORY:  DM (diabetes mellitus)      HTN (hypertension)      Below knee amputation status, right      CKD (chronic kidney disease)      MRSA (methicillin resistant Staphylococcus aureus) colonization  (hx/o MRSA growth from wound culture)      Wound  (chronic wounds to left foot and leg)      DM (diabetes mellitus)      HTN (hypertension)      Kidney disease      S/P BKA (below knee amputation) unilateral, right      H/O peripheral artery bypass  left fem to below-knee pop with RSVG      Amputated left leg  above knee      Amputated right leg  below knee          Allergies  No Known Allergies    ANTIMICROBIALS (past 90 days)  MEDICATIONS  (STANDING):  piperacillin/tazobactam IVPB..   25 mL/Hr IV Intermittent (04-26-24 @ 06:11)   25 mL/Hr IV Intermittent (04-25-24 @ 14:35)    piperacillin/tazobactam IVPB...   200 mL/Hr IV Intermittent (04-25-24 @ 00:58)    vancomycin  IVPB.   250 mL/Hr IV Intermittent (04-25-24 @ 01:48)        piperacillin/tazobactam IVPB.. 3.375 every 12 hours    MEDICATIONS  (STANDING):  acetaminophen     Tablet .. 650 every 6 hours PRN  aluminum hydroxide/magnesium hydroxide/simethicone Suspension 30 every 4 hours PRN  aspirin  chewable 81 daily  atorvastatin 40 at bedtime  furosemide    Tablet 40 two times a day  heparin   Injectable 5000 every 12 hours  lactulose Syrup 10 daily  melatonin 3 at bedtime PRN  midodrine. 5 <User Schedule>  ondansetron Injectable 4 every 8 hours PRN  oxyCODONE    IR 5 every 6 hours PRN  pantoprazole    Tablet 40 before breakfast  senna 2 at bedtime    SOCIAL HISTORY:  Denies alcohol, tobacco, recreational drug use     FAMILY HISTORY:  Family history of diabetes mellitus      REVIEW OF SYSTEMS  [  ] ROS unobtainable because:    [x  ] All other systems negative except as noted below:	    Constitutional:  [ ] fever [ ] chills  [ ] weight loss  [ ] weakness  Skin:  [ ] rash [ ] phlebitis	  Eyes: [ ] icterus [ ] pain  [ ] discharge	  ENMT: [ ] sore throat  [ ] thrush [ ] ulcers [ ] exudates  Respiratory: [ ] dyspnea [ ] hemoptysis [ ] cough [ ] sputum	  Cardiovascular:  [ ] chest pain [ ] palpitations [ ] edema	  Gastrointestinal:  [ ] nausea [ ] vomiting [ ] diarrhea [ ] constipation [ ] pain	  Genitourinary:  [ ] dysuria [ ] frequency [ ] hematuria [ ] discharge [ ] flank pain  [ ] incontinence  Musculoskeletal:  [ ] myalgias [ ] arthralgias [ ] arthritis  [ ] back pain  Neurological:  [ ] headache [ ] seizures  [ ] confusion/altered mental status  Psychiatric:  [ ] anxiety [ ] depression	  Hematology/Lymphatics:  [ ] lymphadenopathy  Endocrine:  [ ] adrenal [ ] thyroid  Allergic/Immunologic:	 [ ] transplant [ ] seasonal    Vital Signs Last 24 Hrs  T(F): 98.8 (04-26-24 @ 05:30), Max: 101.1 (04-24-24 @ 20:33)  Vital Signs Last 24 Hrs  HR: 76 (04-26-24 @ 05:30) (76 - 93)  BP: 110/61 (04-26-24 @ 05:30) (110/61 - 135/74)  RR: 18 (04-26-24 @ 05:30)  SpO2: 99% (04-26-24 @ 05:30) (98% - 100%)  Wt(kg): --    Physical Exam:  Constitutional:  well preserved, comfortable  Head/Eyes: no icterus  ENT:  supple, no cervical lymphadenopathy   LUNGS:  CTA  CVS:  regular rhythm, no murmur  Abd:  soft, non-tender; non-distended  Ext:  R BKA and L AKA  Vascular: L AVF nonTTP, no erythema, no drainage  MSK:  joints without swelling  Neuro: AAO X 2, non- focal                              12.6   10.13 )-----------( 163      ( 26 Apr 2024 05:15 )             43.2   04-26    134<L>  |  91<L>  |  48<H>  ----------------------------<  50<LL>  5.3   |  27  |  4.83<H>    Ca    9.0      26 Apr 2024 05:15  Phos  1.5     04-26  Mg     4.60     04-26    TPro  7.7  /  Alb  2.8<L>  /  TBili  0.6  /  DBili  x   /  AST  84<H>  /  ALT  19  /  AlkPhos  92  04-25    Urinalysis Basic - ( 26 Apr 2024 05:15 )    Color: x / Appearance: x / SG: x / pH: x  Gluc: 50 mg/dL / Ketone: x  / Bili: x / Urobili: x   Blood: x / Protein: x / Nitrite: x   Leuk Esterase: x / RBC: x / WBC x   Sq Epi: x / Non Sq Epi: x / Bacteria: x    MICROBIOLOGY:  Culture - Urine (collected 24 Apr 2024 22:22)  Source: Clean Catch Clean Catch (Midstream)  Final Report (25 Apr 2024 23:00):    No growth    Culture - Blood (collected 24 Apr 2024 22:02)  Source: .Blood Blood-Peripheral  Gram Stain (26 Apr 2024 05:49):    Growth in aerobic bottle: Gram Positive Cocci in Clusters    Growth in anaerobic bottle: Gram Positive Cocci in Clusters  Preliminary Report (26 Apr 2024 05:49):    Growth in aerobic bottle: Gram Positive Cocci in Clusters    Growth in anaerobic bottle: Gram Positive Cocci in Clusters    Culture - Blood (collected 24 Apr 2024 21:58)  Source: .Blood Blood-Peripheral  Gram Stain (25 Apr 2024 21:05):    Growth in aerobic bottle: Gram Positive Cocci in Clusters  Preliminary Report (25 Apr 2024 21:05):    Growth in aerobic bottle: Gram Positive Cocci in Clusters    Direct identification is available within approximately 3-5    hours either by Blood Panel Multiplexed PCR or Direct    MALDI-TOF. Details: [email or web address]  Organism: Blood Culture PCR (25 Apr 2024 23:07)  Organism: Blood Culture PCR (25 Apr 2024 23:07)      Method Type: PCR      -  Methicillin resistant Staphylococcus aureus (MRSA): Detec                  RADIOLOGY:  imaging below personally reviewed and agree with findings  < from: CT Abdomen and Pelvis No Cont (04.24.24 @ 23:08) >  IMPRESSION:  1.  Constipation with a large stool ball impacted within the upper rectum   which shows marked thickening of its wall with associated mesorectal   inflammatory stranding. These findings are worrisome for developing   stercoralcolitis in the setting of pressure induced ischemia. Surgical   evaluation is advised.  2.  Numerous lower thoracic findings may be further assessed and   characterized with dedicated CT chest.  3.  Abnormal appearance of the myocardium; cardiology evaluation is   advised.    < end of copied text >

## 2024-04-26 NOTE — PROGRESS NOTE ADULT - PROBLEM SELECTOR PLAN 1
Due to MRSA bacteremia  -unclear source of MRSA infection  -CT chest without obvious PNA, did mention a masslike consolidation in LLL most suggestive of atelectasis  -urine culture with no growth  -c/w vancomycin 1000mg after HD on HD days  -Zosyn d/jessie  -TTE ordered  -Trend blood cultures q48h until clear

## 2024-04-26 NOTE — PHYSICAL THERAPY INITIAL EVALUATION ADULT - PERTINENT HX OF CURRENT PROBLEM, REHAB EVAL
Pt is a 69 year old male with PMH ESRD on HD via left AVF MWF with Dr. Nino Vital, CVA, BKA/AKA, functionally quadriplegic, CAD, HTN, HLD, history of O2 as needed presents for evaluation of vomiting.  Per Ed notes pt was brought in from HD, started vomiting while receiving treatment.  Pt report generalized pain but no chest pain, abdominal pain, headache.

## 2024-04-26 NOTE — PROGRESS NOTE ADULT - SUBJECTIVE AND OBJECTIVE BOX
Anne Lee  [facility]  Pager #[phone]  Available on Microsoft Teams    Patient is a 69y old  Male who presents with a chief complaint of Nausea and vomiting     (26 Apr 2024 12:11)      SUBJECTIVE / OVERNIGHT EVENTS: Patient seen and examined at bedside. Pt denies abdominal pain, nausea or vomiting. Has no idea why he is in the hospital.    ADDITIONAL REVIEW OF SYSTEMS:    MEDICATIONS  (STANDING):  ascorbic acid 500 milliGRAM(s) Oral daily  aspirin enteric coated 81 milliGRAM(s) Oral daily  atorvastatin 40 milliGRAM(s) Oral at bedtime  furosemide    Tablet 40 milliGRAM(s) Oral two times a day  heparin   Injectable 5000 Unit(s) SubCutaneous every 12 hours  lactobacillus acidophilus 1 Tablet(s) Oral daily  lactulose Syrup 10 Gram(s) Oral daily  midodrine. 5 milliGRAM(s) Oral <User Schedule>  Nephro-hannah 1 Tablet(s) Oral daily  pantoprazole    Tablet 40 milliGRAM(s) Oral before breakfast  senna 2 Tablet(s) Oral at bedtime    MEDICATIONS  (PRN):  acetaminophen     Tablet .. 650 milliGRAM(s) Oral every 6 hours PRN Temp greater or equal to 38C (100.4F), Mild Pain (1 - 3)  aluminum hydroxide/magnesium hydroxide/simethicone Suspension 30 milliLiter(s) Oral every 4 hours PRN Dyspepsia  melatonin 3 milliGRAM(s) Oral at bedtime PRN Insomnia  ondansetron Injectable 4 milliGRAM(s) IV Push every 8 hours PRN Nausea and/or Vomiting  oxyCODONE    IR 5 milliGRAM(s) Oral every 6 hours PRN moderate to severe pain  sodium chloride 0.9% Bolus. 100 milliLiter(s) IV Bolus every 5 minutes PRN SBP LESS THAN or EQUAL to 80 mmHg      CAPILLARY BLOOD GLUCOSE      POCT Blood Glucose.: 102 mg/dL (26 Apr 2024 14:54)  POCT Blood Glucose.: 94 mg/dL (26 Apr 2024 11:07)  POCT Blood Glucose.: 85 mg/dL (26 Apr 2024 07:07)  POCT Blood Glucose.: 134 mg/dL (25 Apr 2024 21:35)    I&O's Summary    26 Apr 2024 07:01  -  26 Apr 2024 16:34  --------------------------------------------------------  IN: 440 mL / OUT: 0 mL / NET: 440 mL        PHYSICAL EXAM:    Vital Signs Last 24 Hrs  T(C): 36.7 (26 Apr 2024 15:05), Max: 37.3 (25 Apr 2024 22:30)  T(F): 98 (26 Apr 2024 15:05), Max: 99.1 (25 Apr 2024 22:30)  HR: 71 (26 Apr 2024 15:05) (70 - 93)  BP: 115/64 (26 Apr 2024 15:05) (110/61 - 123/68)  BP(mean): --  RR: 18 (26 Apr 2024 15:05) (11 - 18)  SpO2: 99% (26 Apr 2024 15:00) (98% - 99%)    Parameters below as of 26 Apr 2024 15:05  Patient On (Oxygen Delivery Method): room air    CONSTITUTIONAL: NAD  EYES: Conjunctiva and sclera clear  RESPIRATORY: Normal respiratory effort; lungs are clear to auscultation bilaterally  CARDIOVASCULAR: Regular rate and rhythm, normal S1 and S2, no murmur/rub/gallop; No lower extremity edema  ABDOMEN: Soft, nontender to palpation, normoactive bowel sounds  MUSCULOSKELETAL: S/p R BKA, L AKA  PSYCH: AAOx1  NEUROLOGY: Does not move extremities  SKIN: LUE AVG    LABS:                        12.6   10.13 )-----------( 163      ( 26 Apr 2024 05:15 )             43.2     04-26    134<L>  |  91<L>  |  48<H>  ----------------------------<  50<LL>  5.3   |  27  |  4.83<H>    Ca    9.0      26 Apr 2024 05:15  Phos  1.5     04-26  Mg     4.60     04-26    TPro  7.7  /  Alb  2.8<L>  /  TBili  0.6  /  DBili  x   /  AST  84<H>  /  ALT  19  /  AlkPhos  92  04-25    PT/INR - ( 24 Apr 2024 22:02 )   PT: 11.8 sec;   INR: 1.05 ratio         PTT - ( 24 Apr 2024 22:02 )  PTT:24.2 sec  CARDIAC MARKERS ( 24 Apr 2024 22:02 )  x     / x     / 64 U/L / x     / 1.3 ng/mL      Urinalysis Basic - ( 26 Apr 2024 05:15 )    Color: x / Appearance: x / SG: x / pH: x  Gluc: 50 mg/dL / Ketone: x  / Bili: x / Urobili: x   Blood: x / Protein: x / Nitrite: x   Leuk Esterase: x / RBC: x / WBC x   Sq Epi: x / Non Sq Epi: x / Bacteria: x        Culture - Urine (collected 24 Apr 2024 22:22)  Source: Clean Catch Clean Catch (Midstream)  Final Report (25 Apr 2024 23:00):    No growth    Culture - Blood (collected 24 Apr 2024 22:02)  Source: .Blood Blood-Peripheral  Gram Stain (26 Apr 2024 05:49):    Growth in aerobic bottle: Gram Positive Cocci in Clusters    Growth in anaerobic bottle: Gram Positive Cocci in Clusters  Preliminary Report (26 Apr 2024 15:44):    Growth in aerobic bottle: Staphylococcus aureus    See previous culture [id number]    Growth in anaerobic bottle: Gram Positive Cocci in Clusters    Culture - Blood (collected 24 Apr 2024 21:58)  Source: .Blood Blood-Peripheral  Gram Stain (26 Apr 2024 11:03):    Growth in aerobic bottle: Gram Positive Cocci in Clusters    Growth in anaerobic bottle: Gram Positive Cocci in Clusters  Preliminary Report (26 Apr 2024 15:43):    Growth in aerobic bottle: Staphylococcus aureus    Growth in anaerobic bottle: Gram Positive Cocci in Clusters    Direct identification is available within approximately 3-5    hours either by Blood Panel Multiplexed PCR or Direct    MALDI-TOF. Details: [email or web address]  Organism: Blood Culture PCR (25 Apr 2024 23:07)  Organism: Blood Culture PCR (25 Apr 2024 23:07)    RADIOLOGY & ADDITIONAL TESTS:     < from: CT Chest No Cont (04.26.24 @ 10:08) >  IMPRESSION:  Oval-shaped masslike consolidation in theleft lower lobe adjacent to the   pleura with mild bronchovascular bundle distortion, suggestive of rounded   atelectasis.    Small bilateral pleural effusions with associated passive atelectasis.    Results Reviewed: Yes  Imaging Personally Reviewed:  Electrocardiogram Personally Reviewed:    COORDINATION OF CARE:  Care Discussed with Consultants/Other Providers [Y/N]:  Prior or Outpatient Records Reviewed [Y/N]:

## 2024-04-26 NOTE — DIETITIAN INITIAL EVALUATION ADULT - PERTINENT LABORATORY DATA
04-26    134<L>  |  91<L>  |  48<H>  ----------------------------<  50<LL>  5.3   |  27  |  4.83<H>    Ca    9.0      26 Apr 2024 05:15  Phos  1.5     04-26  Mg     4.60     04-26    TPro  7.7  /  Alb  2.8<L>  /  TBili  0.6  /  DBili  x   /  AST  84<H>  /  ALT  19  /  AlkPhos  92  04-25  POCT Blood Glucose.: 102 mg/dL (04-26-24 @ 14:54)  A1C with Estimated Average Glucose Result: 4.7 % (10-07-23 @ 09:30)  A1C with Estimated Average Glucose Result: 5.1 % (06-23-23 @ 10:25)

## 2024-04-26 NOTE — PROGRESS NOTE ADULT - SUBJECTIVE AND OBJECTIVE BOX
Cardiology Consult Progress Note    Subjective:    No acute events overnight. No significant tele events. ROS negative at the bedside.     REVIEW OF SYSTEMS:  CONSTITUTIONAL: No weakness, fevers or chills  EYES/ENT: No visual changes;  No dysphagia  NECK: No pain or stiffness  RESPIRATORY: No cough, wheezing, hemoptysis; No shortness of breath  CARDIOVASCULAR: No chest pain or palpitations; No lower extremity edema  GASTROINTESTINAL: No abdominal or epigastric pain. No nausea, vomiting, or hematemesis; No diarrhea or constipation. No melena or hematochezia.  BACK: No back pain  GENITOURINARY: No dysuria, frequency or hematuria  NEUROLOGICAL: No numbness or weakness  SKIN: No itching, burning, rashes, or lesions   All other review of systems is negative unless indicated above.    Physical Exam:  T(F): 98.8 (04-26), Max: 99.1 (04-25)  HR: 76 (04-26) (76 - 93)  BP: 110/61 (04-26) (110/61 - 135/74)  BP(mean): --  ABP: --  ABP(mean): --  RR: 18 (04-26)  SpO2: 99% (04-26)  GENERAL: No acute distress, well-developed  HEAD:  Atraumatic, Normocephalic  ENT: EOMI, PERRLA, conjunctiva and sclera clear, Neck supple, No JVD, moist mucosa  CHEST/LUNG: Clear to auscultation bilaterally; No wheeze, equal breath sounds bilaterally   BACK: No spinal tenderness  HEART: Regular rate and rhythm; No murmurs, rubs, or gallops  ABDOMEN: Soft, Nontender, Nondistended; Bowel sounds present  EXTREMITIES:  No clubbing, cyanosis, or edema  PSYCH: Nl behavior, nl affect  NEUROLOGY: AAOx3, non-focal, cranial nerves intact  SKIN: Normal color, No rashes or lesions  LINES:    Cardiovascular diagnostic testings: personally reviewed    Imaging diagnostic testings: personally reviewed    Labs: Personally reviewed                        12.6   10.13 )-----------( 163      ( 26 Apr 2024 05:15 )             43.2     04-26    134<L>  |  91<L>  |  48<H>  ----------------------------<  x   5.3   |  27  |  4.83<H>    Ca    9.0      26 Apr 2024 05:15  Phos  1.5     04-26  Mg     4.60     04-26    TPro  7.7  /  Alb  2.8<L>  /  TBili  0.6  /  DBili  x   /  AST  84<H>  /  ALT  19  /  AlkPhos  92  04-25    PT/INR - ( 24 Apr 2024 22:02 )   PT: 11.8 sec;   INR: 1.05 ratio         PTT - ( 24 Apr 2024 22:02 )  PTT:24.2 sec    CARDIAC MARKERS ( 25 Apr 2024 02:11 )  355 ng/L / x     / x     / x     / x     / x      CARDIAC MARKERS ( 24 Apr 2024 22:02 )  384 ng/L / x     / x     / 64 U/L / x     / 1.3 ng/mL                 Cardiology Consult Progress Note    No acute events overnight. Has MRSA bacteremia. No significant tele events. ROS negative at the bedside.     Subjective: Patient without acute complaint today. No chest pain endorsed. No shortness of breath. Tolerating PO    REVIEW OF SYSTEMS:  CONSTITUTIONAL: No weakness, fevers or chills  EYES/ENT: No visual changes;  No dysphagia  NECK: No pain or stiffness  RESPIRATORY: No cough, wheezing, hemoptysis; No shortness of breath  CARDIOVASCULAR: No chest pain or palpitations; No lower extremity edema  GASTROINTESTINAL: No abdominal or epigastric pain. No nausea, vomiting, or hematemesis; No diarrhea or constipation. No melena or hematochezia.  BACK: No back pain  GENITOURINARY: No dysuria, frequency or hematuria  NEUROLOGICAL: No numbness or weakness  SKIN: No itching, burning, rashes, or lesions   All other review of systems is negative unless indicated above.    Physical Exam:  T(F): 98.8 (04-26), Max: 99.1 (04-25)  HR: 76 (04-26) (76 - 93)  BP: 110/61 (04-26) (110/61 - 135/74)  RR: 18 (04-26)  SpO2: 99% (04-26)    CONSTITUTIONAL: NAD  HEENT: Moist oral mucosa  RESPIRATORY: Normal respiratory effort, lungs with trace lower lobe crackles on auscultation bilaterally  CARDIOVASCULAR: Regular rate and rhythm, normal S1 and S2, holosystolic murmur at left sternal border appreciated, peripheral pulses are palpable bilaterally in upper extremities  ABDOMEN: Soft, nondistended, nontender to palpation, normoactive bowel sounds, no rebound/guarding  EXTREMITIES: L AKA, R BKA  PSYCH: A+O to name and location only  NEUROLOGY: Facial expression symmetric  SKIN: No rashes, no palpable lesions      Cardiovascular diagnostic testings: personally reviewed    Imaging diagnostic testings: personally reviewed    Labs: Personally reviewed                        12.6   10.13 )-----------( 163      ( 26 Apr 2024 05:15 )             43.2     04-26    134<L>  |  91<L>  |  48<H>  ----------------------------<  x   5.3   |  27  |  4.83<H>    Ca    9.0      26 Apr 2024 05:15  Phos  1.5     04-26  Mg     4.60     04-26    TPro  7.7  /  Alb  2.8<L>  /  TBili  0.6  /  DBili  x   /  AST  84<H>  /  ALT  19  /  AlkPhos  92  04-25    PT/INR - ( 24 Apr 2024 22:02 )   PT: 11.8 sec;   INR: 1.05 ratio         PTT - ( 24 Apr 2024 22:02 )  PTT:24.2 sec    CARDIAC MARKERS ( 25 Apr 2024 02:11 )  355 ng/L / x     / x     / x     / x     / x      CARDIAC MARKERS ( 24 Apr 2024 22:02 )  384 ng/L / x     / x     / 64 U/L / x     / 1.3 ng/mL

## 2024-04-26 NOTE — PROGRESS NOTE ADULT - ATTENDING COMMENTS
The patient was seen and examined with the Cardiology Consultation Teaching Service.     He is a 69-year-old man with end-stage renal disease on hemodialysis, prior stroke, hypertension and severe baseline dementia who presented with vomiting.     The patient is unable to provide a reliable history.   He is unable to recall experiencing nausea or vomiting.     This morning:  No chest pain  No dyspnea, orthopnea or PND  No palpitations or dizziness  No nausea or vomiting  No pain    PMH/PSH:  Chronic heart failure with reduced LV function  Peripheral arterial disease  End-stage renal disease on hemodialysis  Stroke  Hypertension  Dyslipidemia  Dementia, baseline orientation to person only  Lower extremity amputation    Comfortable-appearing man in no acute distress  Alert and oriented to person  Afebrile  Vital signs stable  JVP is not elevated  Clear lungs  Normal heart sounds  Extremities are warm and perfused  No peripheral edema     Leukocytosis 12.6K  Borderline normocytic anemia Hb 12.6  Hyponatremia 134  Hypochloremia 91  GFR 12  Lactate 2.2    Hs-troponin 355, 384  CK-MB is normal    ECG demonstrates sinus rhythm without acute changes when compared with prior ECG    Echocardiography in 6/2023 demonstrated mild-moderate reduced LV systolic dysfunction, LVEF 40-45%, diastolic dysfunction, mild-moderate LA enlargement, mild MR, moderate TR, mild AS. Pulmonary pressures are moderately elevated.     Abdominal CT suggestive of colitis    Impression and Recommendations:   69-year-old man with end-stage renal disease on hemodialysis, prior stroke, hypertension and severe baseline dementia who presented with vomiting noted to have an abnormal hs-troponin.    Hs-troponin elevation is a non-specific finding in patients on hemodialysis. CK-MB is normal. I do not suspect that this patient's hs-troponin represents acute coronary syndrome or myocardial infarction and I would not recommend treating him as such.     He appears to be euvolemic. Echocardiography is reasonable, but his LV function is known to be abnormal, and a change in that function will not help differentiate whether the patient is experiencing ACS.     The patient should be maintained on aspirin and atorvastatin for his known peripheral arterial disease and reported coronary disease.     The patient is not on medical therapy for his cardiomyopathy, but takes midodrine to help facilitate hemodialysis. I suspect that he has not tolerated medical therapy in the past due to his blood pressure and/or electrolytes.     Standard therapy for heart failure would include ROBINA inhibition with ARNI, ARB or ACE-inhibitor, beta-blocker such as metoprolol succinate, MRA such as spironolactone, and an SGLT2-inhibitor. Hydralazine-isosorbide could also be considered in this patient. Clearly, this patient's ability to tolerate dialysis, however, should be prioritized.     Please call us with additional questions or concerns.  The Cardiology Consultation Teaching Service is signing off at this time.     Osmin Ruby MD [facility] FACP  Cardiology  [phone]

## 2024-04-26 NOTE — PROGRESS NOTE ADULT - PROBLEM SELECTOR PLAN 6
Need to verify Percocet - noted on HD paperwork but not seen on [place]    Added oxy 5 mg q6h prn for mod to severe pain  DVT ppx: heparin bid   Diet: Passed dysphagia screen dysphagia screen,. Renal DASH TLC easy to chew  On prn O2 at home- CT chest pending, history of pleural effusions requiring tap  Plan of care discussed with hayden Vincent [phone] Need to verify Percocet - noted on HD paperwork but not seen on [place]    Added oxy 5 mg q6h prn for mod to severe pain  DVT ppx: heparin bid   Diet: Passed dysphagia screen dysphagia screen,. Renal DASH TLC easy to chew  On prn O2 at home- CT chest pending, history of pleural effusions requiring tap  Plan of care discussed with hayden Vincent [phone] on 4/26

## 2024-04-26 NOTE — DIETITIAN INITIAL EVALUATION ADULT - ORAL INTAKE PTA/DIET HISTORY
Diet hx obtained from pt's niece Ramonita, who reports pt's appetite "comes and goes". Pt resides at NH for almost 10 years as per niece. Pt's diet was supplementing with different shakes has had Ensure, Glucerna, Boost, and Nepro as per niece.

## 2024-04-26 NOTE — DIETITIAN INITIAL EVALUATION ADULT - ADD RECOMMEND
1) Recommend d/c DASH/TLC diet to encourage PO intake.   2) Pt with hypophosphatemia, recommend consider d/c phos binder as per medical discretion.   3) Recommend consider add Nepro-hannah and ascorbic acid to promote wound healing.  4) Monitor PO intake, Labs, weights, BMs, and skin integrity.   5) RD to remain available for further nutritional interventions as indicated.

## 2024-04-26 NOTE — DIETITIAN INITIAL EVALUATION ADULT - OTHER INFO
Per chart review, 68M pmhx of ESRD on HD (LUE AVF, MWF), functional quadriplegia (R sided BKA + L sided AKA,), blindness, CAD, HTN, HLD, on 2L home O2 presents as a possible stroke from HD today. was 20minutes in when he became hypotensive. they stopped and gave fluids. he normalized. they did more HD and then he became confused. EMS was called and pt was picked up. no one was certain about his baseline.    Patient seen at bedside. Attempted to obtain nutrition information and food preferences from pt, pt refused to speak to RD. Pt sates "stay quite". Contacted pts' niece, who reports pt's appetite has been fair in general, fluctuates from day to day. As per niece, pt likes sweets. Amenable for pt to receive Mighty Shake 1x daily (220kcal, 6gm pro per each serving) and Magic Cup 2x daily (580kcal, 18gm pro) to provide optimal nutrition. Patient currently on DASH/TLC and Renal diet. PO intake per RN flow sheet <25% consumption. Recommend consider d/c DASH/TLC therapeutic diet restriction to encourage PO intake. Pt currently noted with Calcium acetate ordered. Labs notable with low phos. Recommend consider d/c calcium acetate. Pt ordered for probiotics to promote wound healing. Pt's skin noted with sacrum wound. Recommend consider add Nephro-Rafaela and ascorbic acid to promote wound healing. Pt noted with adm Ht 5'6, pt noted with LLE AKA, and RLE BKA. Adm Ht may not be accurate. Most recent adm weight 108lbs (4/26). Per previous RD note dated 10/9/23 pt's weight 113.3lbs (10/9/23). Weight trend reflects weight loss of 5.3lbs. Weight loss likely due to fluid shifts as pt on HD. Lasix in use. RD to remain available for further nutritional interventions as indicated.

## 2024-04-26 NOTE — DIETITIAN INITIAL EVALUATION ADULT - PHYSCIAL ASSESSMENT
Unable to obtain consent from pt. Pt visibly observed with moderate fat loss and muscle wasting./emaciated/contracted

## 2024-04-26 NOTE — DIETITIAN INITIAL EVALUATION ADULT - NS FNS DIET ORDER
Diet, Regular:   DASH/TLC {Sodium & Cholesterol Restricted} (DASH)  Easy to Chew (EASYTOCHEW)  For patients receiving Renal Replacement - No Protein Restr, No Conc K, No Conc Phos, Low Sodium (RENAL) (04-25-24 @ 14:16) [Active]

## 2024-04-26 NOTE — CHART NOTE - NSCHARTNOTEFT_GEN_A_CORE
Infectious disease consulted for positive blood cultures.    Kenan De Dios PA-C,   Internal Medicine ACP   In house pager #[phone]

## 2024-04-26 NOTE — PROGRESS NOTE ADULT - ASSESSMENT
69 year old man with history of ESRD on HD MWF, CAD, prior CVA, HTN, HLD, L AKA, R BKA presenting to hospital due to vomiting during HD. Febrile on presentation with positive UA on admission. CT A/P with findings concerning for stercoral colitis, LV dilatation and ?lower PNA.    #Sepsis  #Elevated troponin, LV dilatation  - Patient without complaint of active chest pain at this time  - Febrile on admission with positive UA, stercoral colitis, ?PNA, elevated lactate concerning for overall septic picture  - Troponin 384 on admission followed by 355. CK not elevated  - EKG appears similar to 10/2023  - Lower concern for NSTEMI at this time  - Elevated troponin likely in setting of acute illness  - Prior TTE 6/2023 showing mildly decreased global LV systolic function of 40-45%. LV size not commented on  - LV dilatation could represent worsening of heart failure versus stress cardiomyopathy versus possible ischemia given known CAD history  - Received HD today, doesn't appear overloaded on exam, has trace crackles at the bases    Recommendations:  - Repeat TTE for further characterization of LV  - Troponin peaked and came down. Can stop trending  - Monitor for chest pain  - Continue ASA, statin  - Monitor on tele      **Recommendations preliminary until attested to by attending**  69 year old man with history of ESRD on HD MWF, CAD, prior CVA, HTN, HLD, L AKA, R BKA presenting to hospital due to vomiting during HD. Febrile on presentation with positive UA on admission. CT A/P with findings concerning for stercoral colitis, LV dilatation and ?lower PNA.    #Sepsis  #Elevated troponin, LV dilatation  - Patient without complaint of active chest pain at this time  - Febrile on admission with positive UA, stercoral colitis, ?PNA, elevated lactate concerning for overall septic picture. Has MRSA bacteremia  - Troponin 384 on admission followed by 355. CK not elevated  - EKG on admission appears similar to 10/2023  - Lower concern for NSTEMI at this time  - Elevated troponin likely in setting of acute illness  - Prior TTE 6/2023 showing mildly decreased global LV systolic function of 40-45%. LV size not commented on  - LV dilatation could represent worsening of heart failure versus stress cardiomyopathy versus possible ischemia given known CAD history  - Last HD 4/25, doesn't appear overloaded on exam on 4/26, trace crackles at the bases remain    Recommendations:  - Follow up TTE for further characterization of LV  - Continue ASA, statin  - Monitor on tele  - Treatment of bacteremia per primary team    Cardiology to sign off at this time. Please reconsult as needed    **Recommendations preliminary until attested to by attending**

## 2024-04-26 NOTE — CONSULT NOTE ADULT - ASSESSMENT
WORK UP:      ANTIBIOTIC:      IMPRESSION:        SUGGESTIONS:        Above recommendations are Preliminary until Attending's Addendum which includes Final Recommendations      Marco A Najera DO, PGY-5   ID fellow  [employer] Teams Preferred  After 5pm/weekends call [phone]   69M with ESRD on HD via left AVF MWF, CVA, BKA/AKA, functionally quadriplegic, CAD, HTN, HLD, brought in from HD center after having nausea and vomiting, ID consulted for assistance.  Found to be febrile 101F, on RA  WBC 11  Cr 4.8  UA with 400WBC, 600 RBC, 0SQE, UCx negative  COVID negative  BCx 4/24 with MRSA  CTAP noncontrast with moderate stool burden concerning for stercoral colitis    IMPRESSION:  #MRSA Bacteremia  #Fever  #ESRD on HD via L AVF  - source unclear, will need to assess AVF    SUGGESTIONS:  - discontinue zosyn  - continue Vancomycin by level  - monitor temperature curve  - trend WBC  - obtain US AVF  - obtain repeat BCx x2 q48 until clear  - obtain TTE  - obtain CXR  - follow up BCx x2      Above recommendations are Preliminary until Attending's Addendum which includes Final Recommendations      Marco A Najera DO, PGY-5   ID fellow  [employer] Teams Preferred  After 5pm/weekends call [phone]   69M with ESRD on HD via left AVF MWF, CVA, BKA/AKA, functionally quadriplegic, CAD, HTN, HLD, brought in from HD center after having nausea and vomiting, ID consulted for assistance.  Found to be febrile 101F, on RA  WBC 11  Cr 4.8  UA with 400WBC, 600 RBC, 0SQE, UCx negative  COVID negative  BCx 4/24 with MRSA  CTAP noncontrast with moderate stool burden concerning for stercoral colitis    IMPRESSION:  #MRSA Bacteremia  #Fever  #ESRD on HD via L AVF  - source unclear, will need to assess AVF    SUGGESTIONS:  - discontinue zosyn  - continue Vancomycin 1G, obtain trough post HD  - monitor temperature curve  - trend WBC  - obtain US AVF  - obtain repeat BCx x2 q48 until clear  - obtain TTE  - obtain CXR  - follow up BCx x2    Case d/w attending and primary team      Marco A Najera DO, PGY-5   ID fellow  [employer] Teams Preferred  After 5pm/weekends call [phone]

## 2024-04-26 NOTE — PROGRESS NOTE ADULT - ASSESSMENT
69-year-old male with PMH end-stage renal on HD via left aVF, CVA, BKA/AKA, functionally quadriplegic, CAD, HTN, HLD, history of O2 as needed presents for evaluation of vomiting.  Brought from HD, started vomiting while receiving treatment.  Patient unable to endorse any specific complaints, per paperwork AO x 1/0 at baseline.  Patient denies chest pain or difficulty breathing, unable to really clarify about abdominal pain. Nephrology consulted for dialysis needs.    A/P   ESRD  Center: [facility]  Nephrologist: Dr. Navarro   Access: L AVF  MWF schedule   HD today   Consent obtained from niece, and proxy, Ramonita Vincent via phone [phone]  Renal diet  Monitor BMP     HTN   Acceptable  UF with HD   Monitor BP     Anemia  Above goal   Monitor Hb     CKD-MBD  Get PO4, PTH   Monitor daily     Nausea/Vomiting  CT A/P shows constipation and wall thickening, worrisome for stercoral colitis  Colorectal surgery f/u

## 2024-04-26 NOTE — PROGRESS NOTE ADULT - ASSESSMENT
69-year-old male with PMH end-stage renal on HD via left aVF, CVA, BKA/AKA, functionally quadriplegic, CAD, HTN, HLD, with fecal impaction on CT scan and hypotension.     Recs:  - no emergent surgical intervention  - s/p disimpaction in ED and having loose stool behind disimpacted firm stool ball  - aggressive bowel regiment, currently on lactulose and senna, can add Miralax   - careper primary team.   - F/u ID recs.       A [facility] Surgery   [id number]

## 2024-04-26 NOTE — PROGRESS NOTE ADULT - CONVERSATION DETAILS
Spoke with patient's niece Ramonita Perezarez [phone] regarding patient's current medical condition. Discussed MRSA bacteremia of unclear source, sepsis, stercoral colitis in setting of severe constipation. Ramonita states that she is the primary decision maker for the patient but splits decision making with patient's 3 children who are in [facility] and the [place]. She will try to come into the hospital to have HCP papers filled out. She states that the patient's mental status at his nursing facility is usually at least AAOx2, and that he may get scared/not talk as much when he is in the hospital without family around.     Ramonita states that the patient is full code.

## 2024-04-26 NOTE — PHARMACOTHERAPY INTERVENTION NOTE - COMMENTS
Recommended to consult infectious diseases since the 4/24 blood culture grew MRSA.        Sherley Travis, PharmD   Clinical Pharmacy Specialist, Infectious Diseases  Tele-Antimicrobial Stewardship Program (Tele-ASP)  Tele-ASP Phone: [phone]  
Recommended to initiate vancomycin dosed based on HD levels for MRSA bacteremia. Patient received vancomycin 1000 mg dose x1 in the ED 4/25 @148. The patient will get HD tm (M/W/F). Discussed with provider to continue vancomycin dosing based on pre-HD level collected 4/26 AM.      Daljit LanD   Clinical Pharmacy Specialist, Infectious Diseases  Tele-Antimicrobial Stewardship Program (Tele-ASP)  Tele-ASP Phone: [phone]

## 2024-04-26 NOTE — PROGRESS NOTE ADULT - SUBJECTIVE AND OBJECTIVE BOX
Surgery Progress Note    INTERVAL EVENTS:   No acute events overnight.    SUBJECTIVE: Patient seen and examined at bedside with surgical team.     OBJECTIVE:    Vital Signs Last 24 Hrs  T(C): 37.1 (26 Apr 2024 05:30), Max: 37.3 (25 Apr 2024 22:30)  T(F): 98.8 (26 Apr 2024 05:30), Max: 99.1 (25 Apr 2024 22:30)  HR: 76 (26 Apr 2024 05:30) (76 - 93)  BP: 110/61 (26 Apr 2024 05:30) (110/61 - 135/74)  BP(mean): --  RR: 18 (26 Apr 2024 05:30) (11 - 22)  SpO2: 99% (26 Apr 2024 05:30) (98% - 100%)    Parameters below as of 26 Apr 2024 05:30  Patient On (Oxygen Delivery Method): room air    I&O's Detail  MEDICATIONS  (STANDING):  aspirin  chewable 81 milliGRAM(s) Oral daily  atorvastatin 40 milliGRAM(s) Oral at bedtime  calcium acetate 667 milliGRAM(s) Oral three times a day with meals  furosemide    Tablet 40 milliGRAM(s) Oral two times a day  heparin   Injectable 5000 Unit(s) SubCutaneous every 12 hours  lactobacillus acidophilus 1 Tablet(s) Oral daily  lactulose Syrup 10 Gram(s) Oral daily  midodrine. 5 milliGRAM(s) Oral <User Schedule>  pantoprazole    Tablet 40 milliGRAM(s) Oral before breakfast  piperacillin/tazobactam IVPB.. 3.375 Gram(s) IV Intermittent every 12 hours  senna 2 Tablet(s) Oral at bedtime    MEDICATIONS  (PRN):  acetaminophen     Tablet .. 650 milliGRAM(s) Oral every 6 hours PRN Temp greater or equal to 38C (100.4F), Mild Pain (1 - 3)  aluminum hydroxide/magnesium hydroxide/simethicone Suspension 30 milliLiter(s) Oral every 4 hours PRN Dyspepsia  melatonin 3 milliGRAM(s) Oral at bedtime PRN Insomnia  ondansetron Injectable 4 milliGRAM(s) IV Push every 8 hours PRN Nausea and/or Vomiting  oxyCODONE    IR 5 milliGRAM(s) Oral every 6 hours PRN moderate to severe pain      PHYSICAL EXAM:  Constitutional:  NAD  Respiratory: Unlabored breathing  Abdomen: Soft, nondistended. No rebound or guarding.  Extremities: WWP, ARIAS spontaneously    LABS:                        12.5   11.06 )-----------( 183      ( 25 Apr 2024 12:15 )             41.9     04-25    132<L>  |  95<L>  |  36<H>  ----------------------------<  68<L>  TNP   |  22  |  3.31<H>    Ca    7.7<L>      25 Apr 2024 12:15    TPro  7.7  /  Alb  2.8<L>  /  TBili  0.6  /  DBili  x   /  AST  84<H>  /  ALT  19  /  AlkPhos  92  04-25    PT/INR - ( 24 Apr 2024 22:02 )   PT: 11.8 sec;   INR: 1.05 ratio         PTT - ( 24 Apr 2024 22:02 )  PTT:24.2 sec  LIVER FUNCTIONS - ( 25 Apr 2024 12:15 )  Alb: 2.8 g/dL / Pro: 7.7 g/dL / ALK PHOS: 92 U/L / ALT: 19 U/L / AST: 84 U/L / GGT: x           Urinalysis Basic - ( 25 Apr 2024 12:15 )    Color: x / Appearance: x / SG: x / pH: x  Gluc: 68 mg/dL / Ketone: x  / Bili: x / Urobili: x   Blood: x / Protein: x / Nitrite: x   Leuk Esterase: x / RBC: x / WBC x   Sq Epi: x / Non Sq Epi: x / Bacteria: x          IMAGING:

## 2024-04-26 NOTE — PROGRESS NOTE ADULT - SUBJECTIVE AND OBJECTIVE BOX
[facility] NE[facility]ROLOGY [facility]ACTICE   MD MARIBELL CARRION MD RUORU WONG, PA    TEL:  FROM 9 AM to 5 PM ---OFFICE: [phone]  AVAILABLE ON TEAMS    FROM 5 PM - 9 AM PLEASE CALL ANSWERING SERVICE: [phone]    RENAL FOLLOW UP NOTE--Date of Service 04-26-24 @ 11:14  --------------------------------------------------------------------------------  HPI:      Pt seen and examined at bedside.       PAST HISTORY  --------------------------------------------------------------------------------  No significant changes to PMH, PSH, FHx, SHx, unless otherwise noted    ALLERGIES & MEDICATIONS  --------------------------------------------------------------------------------  Allergies    No Known Allergies    Intolerances      Standing Inpatient Medications  aspirin  chewable 81 milliGRAM(s) Oral daily  atorvastatin 40 milliGRAM(s) Oral at bedtime  calcium acetate 667 milliGRAM(s) Oral three times a day with meals  furosemide    Tablet 40 milliGRAM(s) Oral two times a day  heparin   Injectable 5000 Unit(s) SubCutaneous every 12 hours  lactobacillus acidophilus 1 Tablet(s) Oral daily  lactulose Syrup 10 Gram(s) Oral daily  midodrine. 5 milliGRAM(s) Oral <User Schedule>  pantoprazole    Tablet 40 milliGRAM(s) Oral before breakfast  piperacillin/tazobactam IVPB.. 3.375 Gram(s) IV Intermittent every 12 hours  senna 2 Tablet(s) Oral at bedtime  sodium phosphate 15 milliMole(s)/250 mL IVPB 15 milliMole(s) IV Intermittent once    PRN Inpatient Medications  acetaminophen     Tablet .. 650 milliGRAM(s) Oral every 6 hours PRN  aluminum hydroxide/magnesium hydroxide/simethicone Suspension 30 milliLiter(s) Oral every 4 hours PRN  melatonin 3 milliGRAM(s) Oral at bedtime PRN  ondansetron Injectable 4 milliGRAM(s) IV Push every 8 hours PRN  oxyCODONE    IR 5 milliGRAM(s) Oral every 6 hours PRN  sodium chloride 0.9% Bolus. 100 milliLiter(s) IV Bolus every 5 minutes PRN      REVIEW OF SYSTEMS  --------------------------------------------------------------------------------  General: no fever    MSK: no edema     VITALS/PHYSICAL EXAM  --------------------------------------------------------------------------------  T(C): 36.3 (04-26-24 @ 09:00), Max: 37.3 (04-25-24 @ 22:30)  HR: 70 (04-26-24 @ 09:00) (70 - 93)  BP: 113/53 (04-26-24 @ 09:00) (110/61 - 123/68)  RR: 18 (04-26-24 @ 09:00) (11 - 18)  SpO2: 98% (04-26-24 @ 09:00) (98% - 99%)  Wt(kg): --    Weight (kg): 49 (04-26-24 @ 05:30)      Physical Exam:  	Gen: NAD  	HEENT: MMM  	Pulm: CTA B/L  	CV: S1S2  	Abd: Soft, +BS  	Ext: No LE edema B/L                      Neuro: Awake   	Skin: Warm and Dry   	Vascular access: avf    LABS/STUDIES  --------------------------------------------------------------------------------              12.6   10.13 >-----------<  163      [04-26-24 @ 05:15]              43.2     134  |  91  |  48  ----------------------------<  50      [04-26-24 @ 05:15]  5.3   |  27  |  4.83        Ca     9.0     [04-26-24 @ 05:15]      Mg     4.60     [04-26-24 @ 05:15]      Phos  1.5     [04-26-24 @ 05:15]    TPro  7.7  /  Alb  2.8  /  TBili  0.6  /  DBili  x   /  AST  84  /  ALT  19  /  AlkPhos  92  [04-25-24 @ 12:15]    PT/INR: PT 11.8 , INR 1.05       [04-24-24 @ 22:02]  PTT: 24.2       [04-24-24 @ 22:02]    CK 64      [04-24-24 @ 22:02]    Creatinine Trend:  SCr 4.83 [04-26 @ 05:15]  SCr 3.31 [04-25 @ 12:15]  SCr 3.07 [04-24 @ 22:02]    Urinalysis - [04-26-24 @ 05:15]      Color  / Appearance  / SG  / pH       Gluc 50 / Ketone   / Bili  / Urobili        Blood  / Protein  / Leuk Est  / Nitrite       RBC  / WBC  / Hyaline  / Gran  / Sq Epi  / Non Sq Epi  / Bacteria       HbA1c 4.2      [07-19-19 @ 09:56]  TSH 1.790      [06-23-23 @ 11:51]  Lipid: chol 119, TG 66, HDL 46, LDL --      [10-07-23 @ 09:30]

## 2024-04-27 LAB
-  CLINDAMYCIN: SIGNIFICANT CHANGE UP
-  DAPTOMYCIN: SIGNIFICANT CHANGE UP
-  ERYTHROMYCIN: SIGNIFICANT CHANGE UP
-  GENTAMICIN: SIGNIFICANT CHANGE UP
-  LINEZOLID: SIGNIFICANT CHANGE UP
-  OXACILLIN: SIGNIFICANT CHANGE UP
-  PENICILLIN: SIGNIFICANT CHANGE UP
-  RIFAMPIN: SIGNIFICANT CHANGE UP
-  TETRACYCLINE: SIGNIFICANT CHANGE UP
-  TRIMETHOPRIM/SULFAMETHOXAZOLE: SIGNIFICANT CHANGE UP
-  VANCOMYCIN: SIGNIFICANT CHANGE UP
ANION GAP SERPL CALC-SCNC: 13 MMOL/L — SIGNIFICANT CHANGE UP (ref 7–14)
ANION GAP SERPL CALC-SCNC: 14 MMOL/L — SIGNIFICANT CHANGE UP (ref 7–14)
BASOPHILS # BLD AUTO: 0.09 K/UL — SIGNIFICANT CHANGE UP (ref 0–0.2)
BASOPHILS NFR BLD AUTO: 1.7 % — SIGNIFICANT CHANGE UP (ref 0–2)
BUN SERPL-MCNC: 20 MG/DL — SIGNIFICANT CHANGE UP (ref 7–23)
BUN SERPL-MCNC: 26 MG/DL — HIGH (ref 7–23)
CALCIUM SERPL-MCNC: 8.1 MG/DL — LOW (ref 8.4–10.5)
CALCIUM SERPL-MCNC: 8.4 MG/DL — SIGNIFICANT CHANGE UP (ref 8.4–10.5)
CHLORIDE SERPL-SCNC: 93 MMOL/L — LOW (ref 98–107)
CHLORIDE SERPL-SCNC: 95 MMOL/L — LOW (ref 98–107)
CO2 SERPL-SCNC: 26 MMOL/L — SIGNIFICANT CHANGE UP (ref 22–31)
CO2 SERPL-SCNC: 28 MMOL/L — SIGNIFICANT CHANGE UP (ref 22–31)
CREAT SERPL-MCNC: 2.78 MG/DL — HIGH (ref 0.5–1.3)
CREAT SERPL-MCNC: 3.39 MG/DL — HIGH (ref 0.5–1.3)
CULTURE RESULTS: ABNORMAL
EGFR: 19 ML/MIN/1.73M2 — LOW
EGFR: 24 ML/MIN/1.73M2 — LOW
EOSINOPHIL # BLD AUTO: 0.78 K/UL — HIGH (ref 0–0.5)
EOSINOPHIL NFR BLD AUTO: 14.4 % — HIGH (ref 0–6)
GLUCOSE BLDC GLUCOMTR-MCNC: 113 MG/DL — HIGH (ref 70–99)
GLUCOSE BLDC GLUCOMTR-MCNC: 116 MG/DL — HIGH (ref 70–99)
GLUCOSE BLDC GLUCOMTR-MCNC: 120 MG/DL — HIGH (ref 70–99)
GLUCOSE BLDC GLUCOMTR-MCNC: 136 MG/DL — HIGH (ref 70–99)
GLUCOSE BLDC GLUCOMTR-MCNC: 71 MG/DL — SIGNIFICANT CHANGE UP (ref 70–99)
GLUCOSE SERPL-MCNC: 112 MG/DL — HIGH (ref 70–99)
GLUCOSE SERPL-MCNC: 56 MG/DL — LOW (ref 70–99)
GRAM STN FLD: ABNORMAL
HCT VFR BLD CALC: 40.7 % — SIGNIFICANT CHANGE UP (ref 39–50)
HGB BLD-MCNC: 12.2 G/DL — LOW (ref 13–17)
IANC: 3.04 K/UL — SIGNIFICANT CHANGE UP (ref 1.8–7.4)
IMM GRANULOCYTES NFR BLD AUTO: 0.2 % — SIGNIFICANT CHANGE UP (ref 0–0.9)
LYMPHOCYTES # BLD AUTO: 0.7 K/UL — LOW (ref 1–3.3)
LYMPHOCYTES # BLD AUTO: 12.9 % — LOW (ref 13–44)
MAGNESIUM SERPL-MCNC: 3.4 MG/DL — HIGH (ref 1.6–2.6)
MAGNESIUM SERPL-MCNC: 3.5 MG/DL — HIGH (ref 1.6–2.6)
MCHC RBC-ENTMCNC: 27.2 PG — SIGNIFICANT CHANGE UP (ref 27–34)
MCHC RBC-ENTMCNC: 30 GM/DL — LOW (ref 32–36)
MCV RBC AUTO: 90.8 FL — SIGNIFICANT CHANGE UP (ref 80–100)
METHOD TYPE: SIGNIFICANT CHANGE UP
MONOCYTES # BLD AUTO: 0.8 K/UL — SIGNIFICANT CHANGE UP (ref 0–0.9)
MONOCYTES NFR BLD AUTO: 14.8 % — HIGH (ref 2–14)
MRSA PCR RESULT.: DETECTED
NEUTROPHILS # BLD AUTO: 3.04 K/UL — SIGNIFICANT CHANGE UP (ref 1.8–7.4)
NEUTROPHILS NFR BLD AUTO: 56 % — SIGNIFICANT CHANGE UP (ref 43–77)
NRBC # BLD: 0 /100 WBCS — SIGNIFICANT CHANGE UP (ref 0–0)
NRBC # FLD: 0 K/UL — SIGNIFICANT CHANGE UP (ref 0–0)
ORGANISM # SPEC MICROSCOPIC CNT: ABNORMAL
PHOSPHATE SERPL-MCNC: 1.5 MG/DL — LOW (ref 2.5–4.5)
PHOSPHATE SERPL-MCNC: 6.1 MG/DL — HIGH (ref 2.5–4.5)
PLATELET # BLD AUTO: 138 K/UL — LOW (ref 150–400)
POTASSIUM SERPL-MCNC: 4.2 MMOL/L — SIGNIFICANT CHANGE UP (ref 3.5–5.3)
POTASSIUM SERPL-MCNC: 4.3 MMOL/L — SIGNIFICANT CHANGE UP (ref 3.5–5.3)
POTASSIUM SERPL-SCNC: 4.2 MMOL/L — SIGNIFICANT CHANGE UP (ref 3.5–5.3)
POTASSIUM SERPL-SCNC: 4.3 MMOL/L — SIGNIFICANT CHANGE UP (ref 3.5–5.3)
RBC # BLD: 4.48 M/UL — SIGNIFICANT CHANGE UP (ref 4.2–5.8)
RBC # FLD: 19.9 % — HIGH (ref 10.3–14.5)
S AUREUS DNA NOSE QL NAA+PROBE: DETECTED
SODIUM SERPL-SCNC: 134 MMOL/L — LOW (ref 135–145)
SODIUM SERPL-SCNC: 135 MMOL/L — SIGNIFICANT CHANGE UP (ref 135–145)
SPECIMEN SOURCE: SIGNIFICANT CHANGE UP
VANCOMYCIN TROUGH SERPL-MCNC: 28.9 UG/ML — CRITICAL HIGH (ref 10–20)
WBC # BLD: 5.42 K/UL — SIGNIFICANT CHANGE UP (ref 3.8–10.5)
WBC # FLD AUTO: 5.42 K/UL — SIGNIFICANT CHANGE UP (ref 3.8–10.5)

## 2024-04-27 PROCEDURE — 99233 SBSQ HOSP IP/OBS HIGH 50: CPT

## 2024-04-27 RX ORDER — DEXTROSE 50 % IN WATER 50 %
12.5 SYRINGE (ML) INTRAVENOUS ONCE
Refills: 0 | Status: COMPLETED | OUTPATIENT
Start: 2024-04-27 | End: 2024-04-27

## 2024-04-27 RX ORDER — SODIUM,POTASSIUM PHOSPHATES 278-250MG
1 POWDER IN PACKET (EA) ORAL ONCE
Refills: 0 | Status: DISCONTINUED | OUTPATIENT
Start: 2024-04-27 | End: 2024-04-27

## 2024-04-27 RX ADMIN — Medication 85 MILLIMOLE(S): at 11:11

## 2024-04-27 RX ADMIN — ATORVASTATIN CALCIUM 40 MILLIGRAM(S): 80 TABLET, FILM COATED ORAL at 21:35

## 2024-04-27 RX ADMIN — POLYETHYLENE GLYCOL 3350 17 GRAM(S): 17 POWDER, FOR SOLUTION ORAL at 12:01

## 2024-04-27 RX ADMIN — OXYCODONE HYDROCHLORIDE 5 MILLIGRAM(S): 5 TABLET ORAL at 03:00

## 2024-04-27 RX ADMIN — LACTULOSE 10 GRAM(S): 10 SOLUTION ORAL at 11:58

## 2024-04-27 RX ADMIN — Medication 1 TABLET(S): at 11:59

## 2024-04-27 RX ADMIN — OXYCODONE HYDROCHLORIDE 5 MILLIGRAM(S): 5 TABLET ORAL at 22:35

## 2024-04-27 RX ADMIN — HEPARIN SODIUM 5000 UNIT(S): 5000 INJECTION INTRAVENOUS; SUBCUTANEOUS at 05:41

## 2024-04-27 RX ADMIN — OXYCODONE HYDROCHLORIDE 5 MILLIGRAM(S): 5 TABLET ORAL at 02:01

## 2024-04-27 RX ADMIN — PANTOPRAZOLE SODIUM 40 MILLIGRAM(S): 20 TABLET, DELAYED RELEASE ORAL at 07:11

## 2024-04-27 RX ADMIN — OXYCODONE HYDROCHLORIDE 5 MILLIGRAM(S): 5 TABLET ORAL at 21:35

## 2024-04-27 RX ADMIN — Medication 3 MILLIGRAM(S): at 21:35

## 2024-04-27 RX ADMIN — Medication 1 TABLET(S): at 12:01

## 2024-04-27 RX ADMIN — SENNA PLUS 2 TABLET(S): 8.6 TABLET ORAL at 21:35

## 2024-04-27 RX ADMIN — HEPARIN SODIUM 5000 UNIT(S): 5000 INJECTION INTRAVENOUS; SUBCUTANEOUS at 17:04

## 2024-04-27 RX ADMIN — Medication 81 MILLIGRAM(S): at 12:01

## 2024-04-27 RX ADMIN — Medication 40 MILLIGRAM(S): at 05:41

## 2024-04-27 RX ADMIN — Medication 40 MILLIGRAM(S): at 14:24

## 2024-04-27 RX ADMIN — CHLORHEXIDINE GLUCONATE 1 APPLICATION(S): 213 SOLUTION TOPICAL at 12:02

## 2024-04-27 RX ADMIN — Medication 500 MILLIGRAM(S): at 12:01

## 2024-04-27 RX ADMIN — Medication 12.5 MILLILITER(S): at 07:43

## 2024-04-27 NOTE — PROGRESS NOTE ADULT - SUBJECTIVE AND OBJECTIVE BOX
[facility]OLOGY [facility]ACT[facility]   MD MARIBELL CARRION MD RUORU WONG, PA    TEL:  OFFICE: [phone]      RENAL FOLLOW UP NOTE-- Date of Service ;; 04-27-24 @ 10:17  --------------------------------------------------------------------------------  HPI:  Pt seen and examined at bedside          PAST HISTORY  --------------------------------------------------------------------------------  No significant changes to PMH, PSH, FHx, SHx, unless otherwise noted    ALLERGIES & MEDICATIONS  --------------------------------------------------------------------------------  Allergies    No Known Allergies    Intolerances      Standing Inpatient Medications  ascorbic acid 500 milliGRAM(s) Oral daily  aspirin enteric coated 81 milliGRAM(s) Oral daily  atorvastatin 40 milliGRAM(s) Oral at bedtime  chlorhexidine 2% Cloths 1 Application(s) Topical daily  furosemide    Tablet 40 milliGRAM(s) Oral two times a day  heparin   Injectable 5000 Unit(s) SubCutaneous every 12 hours  lactobacillus acidophilus 1 Tablet(s) Oral daily  lactulose Syrup 10 Gram(s) Oral daily  midodrine. 5 milliGRAM(s) Oral <User Schedule>  Nephro-hannah 1 Tablet(s) Oral daily  pantoprazole    Tablet 40 milliGRAM(s) Oral before breakfast  polyethylene glycol 3350 17 Gram(s) Oral daily  senna 2 Tablet(s) Oral at bedtime  sodium phosphate 30 milliMole(s)/500 mL IVPB 30 milliMole(s) IV Intermittent once    PRN Inpatient Medications  acetaminophen     Tablet .. 650 milliGRAM(s) Oral every 6 hours PRN  aluminum hydroxide/magnesium hydroxide/simethicone Suspension 30 milliLiter(s) Oral every 4 hours PRN  melatonin 3 milliGRAM(s) Oral at bedtime PRN  ondansetron Injectable 4 milliGRAM(s) IV Push every 8 hours PRN  oxyCODONE    IR 5 milliGRAM(s) Oral every 6 hours PRN  sodium chloride 0.9% Bolus. 100 milliLiter(s) IV Bolus every 5 minutes PRN      REVIEW OF SYSTEMS  --------------------------------------------------------------------------------  General: no fever  CVS: no chest pain  RESP: no sob, no cough  ABD: no abdominal pain  : no dysuria,  MSK: no edema     VITALS/PHYSICAL EXAM  --------------------------------------------------------------------------------  T(C): 36.6 (04-26-24 @ 20:30), Max: 36.7 (04-26-24 @ 15:05)  HR: 73 (04-26-24 @ 20:30) (71 - 73)  BP: 118/60 (04-26-24 @ 20:30) (115/52 - 126/64)  RR: 18 (04-26-24 @ 20:30) (16 - 18)  SpO2: 97% (04-26-24 @ 20:30) (97% - 99%)  Wt(kg): --    Weight (kg): 49 (04-26-24 @ 05:30)      04-26-24 @ 07:01  -  04-27-24 @ 07:00  --------------------------------------------------------  IN: 940 mL / OUT: 1500 mL / NET: -560 mL      Physical Exam:  	Gen: NAD  	HEENT: MMM  	Pulm: CTA B/L  	CV: S1S2  	Abd: Soft, +BS  	Ext: No LE edema B/L                    RBKA, POLLY                    Neuro: Awake  	Skin: Warm and Dry   	Vascular access: LAVF          HARRIET cervantes    LABS/STUDIES  --------------------------------------------------------------------------------              12.2   5.42  >-----------<  138      [04-27-24 @ 04:50]              40.7     135  |  95  |  20  ----------------------------<  56      [04-27-24 @ 04:50]  4.3   |  26  |  2.78        Ca     8.4     [04-27-24 @ 04:50]      Mg     3.40     [04-27-24 @ 04:50]      Phos  1.5     [04-27-24 @ 04:50]    TPro  7.7  /  Alb  2.8  /  TBili  0.6  /  DBili  x   /  AST  84  /  ALT  19  /  AlkPhos  92  [04-25-24 @ 12:15]          Creatinine Trend:  SCr 2.78 [04-27 @ 04:50]  SCr 4.83 [04-26 @ 05:15]  SCr 3.31 [04-25 @ 12:15]  SCr 3.07 [04-24 @ 22:02]    Urinalysis - [04-27-24 @ 04:50]      Color  / Appearance  / SG  / pH       Gluc 56 / Ketone   / Bili  / Urobili        Blood  / Protein  / Leuk Est  / Nitrite       RBC  / WBC  / Hyaline  / Gran  / Sq Epi  / Non Sq Epi  / Bacteria       HbA1c 4.2      [07-19-19 @ 09:56]  TSH 1.790      [06-23-23 @ 11:51]  Lipid: chol 119, TG 66, HDL 46, LDL --      [10-07-23 @ 09:30]

## 2024-04-27 NOTE — PROGRESS NOTE ADULT - PROBLEM SELECTOR PLAN 1
Due to MRSA bacteremia  -unclear source of MRSA infection  -CT chest without obvious PNA, did mention a masslike consolidation in LLL most suggestive of atelectasis  -urine culture with no growth  -ID following, appreciate recs  -c/w vancomycin 1000mg after HD on HD days  -TTE without obvious vegetations noted  -Trend blood cultures q48h until clear

## 2024-04-27 NOTE — PROGRESS NOTE ADULT - ASSESSMENT
69-year-old male with PMH end-stage renal on HD via left aVF, CVA, BKA/AKA, functionally quadriplegic, CAD, HTN, HLD, history of O2 as needed presents for evaluation of vomiting.  Brought from HD, started vomiting while receiving treatment.  Patient unable to endorse any specific complaints, per paperwork AO x 1/0 at baseline.  Patient denies chest pain or difficulty breathing, unable to really clarify about abdominal pain. Nephrology consulted for dialysis needs.    A/P   ESRD  Center: [place]  Nephrologist: Dr. Navarro   Access: L AVF  MWF schedule   HD 4/26/24  Consent obtained from niece, and proxy, Ramonita Vincent via phone [phone]  Renal diet  Monitor BMP     HTN   BP optimal  UF with HD   Monitor BP     Anemia  Above goal   Monitor Hb     CKD-MBD  Get PO4, PTH   Monitor daily     Nausea/Vomiting  CT A/P shows constipation and wall thickening, worrisome for stercoral colitis  Colorectal surgery f/u 69-year-old male with PMH end-stage renal on HD via left aVF, CVA, BKA/AKA, functionally quadriplegic, CAD, HTN, HLD, history of O2 as needed presents for evaluation of vomiting.  Brought from HD, started vomiting while receiving treatment.  Patient unable to endorse any specific complaints, per paperwork AO x 1/0 at baseline.  Patient denies chest pain or difficulty breathing, unable to really clarify about abdominal pain. Nephrology consulted for dialysis needs.    A/P   ESRD  Center: [place]  Nephrologist: Dr. Navarro   Access: L AVF  MWF schedule   s/p HD 4/26/24  Consent obtained from niece, and proxy, Ramonita Vincent via phone [phone]  Renal diet  Monitor BMP     HTN   BP optimal  UF with HD   Monitor BP     Anemia  Above goal   Monitor Hb     CKD-MBD  Get PO4, PTH   Monitor daily     Nausea/Vomiting  CT A/P shows constipation and wall thickening, worrisome for stercoral colitis  Colorectal surgery f/u

## 2024-04-27 NOTE — PROGRESS NOTE ADULT - PROBLEM SELECTOR PLAN 6
Need to verify Percocet - noted on HD paperwork but not seen on [place]    Added oxy 5 mg q6h prn for mod to severe pain  DVT ppx: heparin bid   Diet: Passed dysphagia screen dysphagia screen,. Renal DASH TLC easy to chew  Plan of care discussed with hayden Vincent [phone] on 4/26. She reports that pt has not yet had HCP paperwork filled out. She would like to come into the hospital to have papers filled out and to discuss with pt

## 2024-04-27 NOTE — PROGRESS NOTE ADULT - SUBJECTIVE AND OBJECTIVE BOX
Anne Enriquez  [facility]  Pager #[phone]  Available on [employer] Teams    Patient is a 69y old  Male who presents with a chief complaint of nausea and vomiting (27 Apr 2024 10:17)      SUBJECTIVE / OVERNIGHT EVENTS: Patient seen and examined at bedside. Pt is a poor historian. Pt states "I don't know" when asked if he is having any pain.    ADDITIONAL REVIEW OF SYSTEMS:    MEDICATIONS  (STANDING):  ascorbic acid 500 milliGRAM(s) Oral daily  aspirin enteric coated 81 milliGRAM(s) Oral daily  atorvastatin 40 milliGRAM(s) Oral at bedtime  chlorhexidine 2% Cloths 1 Application(s) Topical daily  furosemide    Tablet 40 milliGRAM(s) Oral two times a day  heparin   Injectable 5000 Unit(s) SubCutaneous every 12 hours  lactobacillus acidophilus 1 Tablet(s) Oral daily  lactulose Syrup 10 Gram(s) Oral daily  midodrine. 5 milliGRAM(s) Oral <User Schedule>  Nephro-hannah 1 Tablet(s) Oral daily  pantoprazole    Tablet 40 milliGRAM(s) Oral before breakfast  polyethylene glycol 3350 17 Gram(s) Oral daily  senna 2 Tablet(s) Oral at bedtime    MEDICATIONS  (PRN):  acetaminophen     Tablet .. 650 milliGRAM(s) Oral every 6 hours PRN Temp greater or equal to 38C (100.4F), Mild Pain (1 - 3)  aluminum hydroxide/magnesium hydroxide/simethicone Suspension 30 milliLiter(s) Oral every 4 hours PRN Dyspepsia  melatonin 3 milliGRAM(s) Oral at bedtime PRN Insomnia  ondansetron Injectable 4 milliGRAM(s) IV Push every 8 hours PRN Nausea and/or Vomiting  oxyCODONE    IR 5 milliGRAM(s) Oral every 6 hours PRN moderate to severe pain  sodium chloride 0.9% Bolus. 100 milliLiter(s) IV Bolus every 5 minutes PRN SBP LESS THAN or EQUAL to 80 mmHg      CAPILLARY BLOOD GLUCOSE      POCT Blood Glucose.: 136 mg/dL (27 Apr 2024 11:17)  POCT Blood Glucose.: 120 mg/dL (27 Apr 2024 08:20)  POCT Blood Glucose.: 71 mg/dL (27 Apr 2024 07:08)  POCT Blood Glucose.: 78 mg/dL (26 Apr 2024 21:31)  POCT Blood Glucose.: 84 mg/dL (26 Apr 2024 17:37)  POCT Blood Glucose.: 102 mg/dL (26 Apr 2024 14:54)    I&O's Summary    26 Apr 2024 07:01  -  27 Apr 2024 07:00  --------------------------------------------------------  IN: 940 mL / OUT: 1500 mL / NET: -560 mL        PHYSICAL EXAM:    Vital Signs Last 24 Hrs  T(C): 36.6 (27 Apr 2024 09:30), Max: 36.7 (26 Apr 2024 15:05)  T(F): 97.8 (27 Apr 2024 09:30), Max: 98 (26 Apr 2024 15:05)  HR: 72 (27 Apr 2024 09:30) (71 - 73)  BP: 125/62 (27 Apr 2024 09:30) (115/52 - 126/64)  BP(mean): --  RR: 18 (27 Apr 2024 09:30) (16 - 18)  SpO2: 96% (27 Apr 2024 09:30) (96% - 99%)    Parameters below as of 27 Apr 2024 09:30  Patient On (Oxygen Delivery Method): room air      CONSTITUTIONAL: NAD  EYES: Conjunctiva and sclera clear  RESPIRATORY: Normal respiratory effort; lungs are clear to auscultation bilaterally  CARDIOVASCULAR: Regular rate and rhythm, normal S1 and S2, no murmur/rub/gallop; No lower extremity edema  ABDOMEN: Soft, nontender to palpation, normoactive bowel sounds  MUSCULOSKELETAL: S/p R BKA, L AKA  PSYCH: AAOx1  NEUROLOGY: Does not move extremities  SKIN: WANDER AV    LABS:                        12.2   5.42  )-----------( 138      ( 27 Apr 2024 04:50 )             40.7     04-27    135  |  95<L>  |  20  ----------------------------<  56<L>  4.3   |  26  |  2.78<H>    Ca    8.4      27 Apr 2024 04:50  Phos  1.5     04-27  Mg     3.40     04-27      Urinalysis Basic - ( 27 Apr 2024 04:50 )    Color: x / Appearance: x / SG: x / pH: x  Gluc: 56 mg/dL / Ketone: x  / Bili: x / Urobili: x   Blood: x / Protein: x / Nitrite: x   Leuk Esterase: x / RBC: x / WBC x   Sq Epi: x / Non Sq Epi: x / Bacteria: x      Culture - Blood (collected 26 Apr 2024 06:00)  Source: .Blood Blood  Gram Stain (27 Apr 2024 08:10):    Growth in aerobic bottle: Gram Positive Cocci in Clusters  Preliminary Report (27 Apr 2024 08:10):    Growth in aerobic bottle: Gram Positive Cocci in Clusters    Culture - Urine (collected 24 Apr 2024 22:22)  Source: Clean Catch Clean Catch (Midstream)  Final Report (25 Apr 2024 23:00):    No growth    Culture - Blood (collected 24 Apr 2024 22:02)  Source: .Blood Blood-Peripheral  Gram Stain (26 Apr 2024 05:49):    Growth in aerobic bottle: Gram Positive Cocci in Clusters    Growth in anaerobic bottle: Gram Positive Cocci in Clusters  Final Report (27 Apr 2024 11:28):    Growth in aerobic and anaerobic bottles: Methicillin Resistant    Staphylococcus aureus    See previous culture [id number]    Culture - Blood (collected 24 Apr 2024 21:58)  Source: .Blood Blood-Peripheral  Gram Stain (26 Apr 2024 11:03):    Growth in aerobic bottle: Gram Positive Cocci in Clusters    Growth in anaerobic bottle: Gram Positive Cocci in Clusters  Final Report (27 Apr 2024 11:27):    Growth in aerobic and anaerobic bottles: Methicillin Resistant    Staphylococcus aureus    Direct identification is available within approximately 3-5    hours either by Blood Panel Multiplexed PCR or Direct    MALDI-TOF. Details: [email or web address]  Organism: Blood Culture PCR  Methicillin resistant Staphylococcus aureus (27 Apr 2024 11:28)  Organism: Methicillin resistant Staphylococcus aureus (27 Apr 2024 11:28)  Organism: Blood Culture PCR (27 Apr 2024 11:28)      RADIOLOGY & ADDITIONAL TESTS:    < from: CT Chest No Cont (04.26.24 @ 10:08) >  IMPRESSION:  Oval-shaped masslike consolidation in theleft lower lobe adjacent to the   pleura with mild bronchovascular bundle distortion, suggestive of rounded   atelectasis.    Small bilateral pleural effusions with associated passive atelectasis.    Results Reviewed: Yes  Imaging Personally Reviewed:  Electrocardiogram Personally Reviewed:    COORDINATION OF CARE:  Care Discussed with Consultants/Other Providers [Y/N]:  Prior or Outpatient Records Reviewed [Y/N]:

## 2024-04-28 LAB
ANION GAP SERPL CALC-SCNC: 18 MMOL/L — HIGH (ref 7–14)
BASOPHILS # BLD AUTO: 0.05 K/UL — SIGNIFICANT CHANGE UP (ref 0–0.2)
BASOPHILS NFR BLD AUTO: 0.8 % — SIGNIFICANT CHANGE UP (ref 0–2)
BUN SERPL-MCNC: 32 MG/DL — HIGH (ref 7–23)
CALCIUM SERPL-MCNC: 8.5 MG/DL — SIGNIFICANT CHANGE UP (ref 8.4–10.5)
CHLORIDE SERPL-SCNC: 92 MMOL/L — LOW (ref 98–107)
CO2 SERPL-SCNC: 26 MMOL/L — SIGNIFICANT CHANGE UP (ref 22–31)
CREAT SERPL-MCNC: 4.18 MG/DL — HIGH (ref 0.5–1.3)
EGFR: 15 ML/MIN/1.73M2 — LOW
EOSINOPHIL # BLD AUTO: 0.4 K/UL — SIGNIFICANT CHANGE UP (ref 0–0.5)
EOSINOPHIL NFR BLD AUTO: 6.8 % — HIGH (ref 0–6)
GLUCOSE BLDC GLUCOMTR-MCNC: 111 MG/DL — HIGH (ref 70–99)
GLUCOSE BLDC GLUCOMTR-MCNC: 128 MG/DL — HIGH (ref 70–99)
GLUCOSE BLDC GLUCOMTR-MCNC: 97 MG/DL — SIGNIFICANT CHANGE UP (ref 70–99)
GLUCOSE BLDC GLUCOMTR-MCNC: 98 MG/DL — SIGNIFICANT CHANGE UP (ref 70–99)
GLUCOSE SERPL-MCNC: 84 MG/DL — SIGNIFICANT CHANGE UP (ref 70–99)
HCT VFR BLD CALC: 38.5 % — LOW (ref 39–50)
HGB BLD-MCNC: 12.1 G/DL — LOW (ref 13–17)
IANC: 3.72 K/UL — SIGNIFICANT CHANGE UP (ref 1.8–7.4)
IMM GRANULOCYTES NFR BLD AUTO: 0.3 % — SIGNIFICANT CHANGE UP (ref 0–0.9)
LYMPHOCYTES # BLD AUTO: 0.84 K/UL — LOW (ref 1–3.3)
LYMPHOCYTES # BLD AUTO: 14.2 % — SIGNIFICANT CHANGE UP (ref 13–44)
MAGNESIUM SERPL-MCNC: 3.8 MG/DL — HIGH (ref 1.6–2.6)
MCHC RBC-ENTMCNC: 27.8 PG — SIGNIFICANT CHANGE UP (ref 27–34)
MCHC RBC-ENTMCNC: 31.4 GM/DL — LOW (ref 32–36)
MCV RBC AUTO: 88.3 FL — SIGNIFICANT CHANGE UP (ref 80–100)
MONOCYTES # BLD AUTO: 0.87 K/UL — SIGNIFICANT CHANGE UP (ref 0–0.9)
MONOCYTES NFR BLD AUTO: 14.7 % — HIGH (ref 2–14)
NEUTROPHILS # BLD AUTO: 3.72 K/UL — SIGNIFICANT CHANGE UP (ref 1.8–7.4)
NEUTROPHILS NFR BLD AUTO: 63.2 % — SIGNIFICANT CHANGE UP (ref 43–77)
NRBC # BLD: 0 /100 WBCS — SIGNIFICANT CHANGE UP (ref 0–0)
NRBC # FLD: 0 K/UL — SIGNIFICANT CHANGE UP (ref 0–0)
PHOSPHATE SERPL-MCNC: 4.3 MG/DL — SIGNIFICANT CHANGE UP (ref 2.5–4.5)
PLATELET # BLD AUTO: 183 K/UL — SIGNIFICANT CHANGE UP (ref 150–400)
POTASSIUM SERPL-MCNC: 4.1 MMOL/L — SIGNIFICANT CHANGE UP (ref 3.5–5.3)
POTASSIUM SERPL-SCNC: 4.1 MMOL/L — SIGNIFICANT CHANGE UP (ref 3.5–5.3)
PTH-INTACT FLD-MCNC: 155 PG/ML — HIGH (ref 15–65)
RBC # BLD: 4.36 M/UL — SIGNIFICANT CHANGE UP (ref 4.2–5.8)
RBC # FLD: 19.8 % — HIGH (ref 10.3–14.5)
SODIUM SERPL-SCNC: 136 MMOL/L — SIGNIFICANT CHANGE UP (ref 135–145)
WBC # BLD: 5.9 K/UL — SIGNIFICANT CHANGE UP (ref 3.8–10.5)
WBC # FLD AUTO: 5.9 K/UL — SIGNIFICANT CHANGE UP (ref 3.8–10.5)

## 2024-04-28 PROCEDURE — 99233 SBSQ HOSP IP/OBS HIGH 50: CPT

## 2024-04-28 RX ADMIN — Medication 650 MILLIGRAM(S): at 22:05

## 2024-04-28 RX ADMIN — ATORVASTATIN CALCIUM 40 MILLIGRAM(S): 80 TABLET, FILM COATED ORAL at 21:18

## 2024-04-28 RX ADMIN — CHLORHEXIDINE GLUCONATE 1 APPLICATION(S): 213 SOLUTION TOPICAL at 12:30

## 2024-04-28 RX ADMIN — Medication 40 MILLIGRAM(S): at 06:55

## 2024-04-28 RX ADMIN — HEPARIN SODIUM 5000 UNIT(S): 5000 INJECTION INTRAVENOUS; SUBCUTANEOUS at 17:25

## 2024-04-28 RX ADMIN — Medication 1 TABLET(S): at 12:27

## 2024-04-28 RX ADMIN — Medication 650 MILLIGRAM(S): at 21:20

## 2024-04-28 RX ADMIN — Medication 500 MILLIGRAM(S): at 12:27

## 2024-04-28 RX ADMIN — PANTOPRAZOLE SODIUM 40 MILLIGRAM(S): 20 TABLET, DELAYED RELEASE ORAL at 06:55

## 2024-04-28 RX ADMIN — HEPARIN SODIUM 5000 UNIT(S): 5000 INJECTION INTRAVENOUS; SUBCUTANEOUS at 06:55

## 2024-04-28 RX ADMIN — Medication 40 MILLIGRAM(S): at 17:25

## 2024-04-28 RX ADMIN — Medication 81 MILLIGRAM(S): at 12:27

## 2024-04-28 NOTE — PROGRESS NOTE ADULT - SUBJECTIVE AND OBJECTIVE BOX
Anne Enriquez  [facility]  Pager #[phone]  Available on Microsoft Teams    Patient is a 69y old  Male who presents with a chief complaint of nausea and vomiting (28 Apr 2024 07:41)      SUBJECTIVE / OVERNIGHT EVENTS: Patient seen and examined at bedside. Pt states he feels very bad. When asked why, he says "I don't know."    ADDITIONAL REVIEW OF SYSTEMS:    MEDICATIONS  (STANDING):  ascorbic acid 500 milliGRAM(s) Oral daily  aspirin enteric coated 81 milliGRAM(s) Oral daily  atorvastatin 40 milliGRAM(s) Oral at bedtime  chlorhexidine 2% Cloths 1 Application(s) Topical daily  furosemide    Tablet 40 milliGRAM(s) Oral two times a day  heparin   Injectable 5000 Unit(s) SubCutaneous every 12 hours  lactobacillus acidophilus 1 Tablet(s) Oral daily  lactulose Syrup 10 Gram(s) Oral daily  midodrine. 5 milliGRAM(s) Oral <User Schedule>  Nephro-hannah 1 Tablet(s) Oral daily  pantoprazole    Tablet 40 milliGRAM(s) Oral before breakfast  polyethylene glycol 3350 17 Gram(s) Oral daily  senna 2 Tablet(s) Oral at bedtime    MEDICATIONS  (PRN):  acetaminophen     Tablet .. 650 milliGRAM(s) Oral every 6 hours PRN Temp greater or equal to 38C (100.4F), Mild Pain (1 - 3)  aluminum hydroxide/magnesium hydroxide/simethicone Suspension 30 milliLiter(s) Oral every 4 hours PRN Dyspepsia  melatonin 3 milliGRAM(s) Oral at bedtime PRN Insomnia  ondansetron Injectable 4 milliGRAM(s) IV Push every 8 hours PRN Nausea and/or Vomiting  oxyCODONE    IR 5 milliGRAM(s) Oral every 6 hours PRN moderate to severe pain  sodium chloride 0.9% Bolus. 100 milliLiter(s) IV Bolus every 5 minutes PRN SBP LESS THAN or EQUAL to 80 mmHg      CAPILLARY BLOOD GLUCOSE      POCT Blood Glucose.: 98 mg/dL (28 Apr 2024 11:07)  POCT Blood Glucose.: 97 mg/dL (28 Apr 2024 07:22)  POCT Blood Glucose.: 113 mg/dL (27 Apr 2024 22:06)  POCT Blood Glucose.: 116 mg/dL (27 Apr 2024 16:36)    I&O's Summary    28 Apr 2024 07:01  -  28 Apr 2024 12:01  --------------------------------------------------------  IN: 70 mL / OUT: 0 mL / NET: 70 mL        PHYSICAL EXAM:    Vital Signs Last 24 Hrs  T(C): 36.8 (28 Apr 2024 09:18), Max: 36.8 (28 Apr 2024 09:18)  T(F): 98.2 (28 Apr 2024 09:18), Max: 98.2 (28 Apr 2024 09:18)  HR: 81 (28 Apr 2024 09:18) (74 - 84)  BP: 139/72 (28 Apr 2024 09:18) (121/63 - 143/72)  BP(mean): --  RR: 18 (28 Apr 2024 09:18) (16 - 18)  SpO2: 99% (28 Apr 2024 09:18) (98% - 100%)    Parameters below as of 28 Apr 2024 09:18  Patient On (Oxygen Delivery Method): room air    CONSTITUTIONAL: NAD  EYES: Conjunctiva and sclera clear  RESPIRATORY: Normal respiratory effort; lungs are clear to auscultation bilaterally  CARDIOVASCULAR: Regular rate and rhythm, normal S1 and S2, no murmur/rub/gallop; No lower extremity edema  ABDOMEN: Soft, nontender to palpation, normoactive bowel sounds  MUSCULOSKELETAL: S/p R BKA, L AKA  PSYCH: AAOx1  NEUROLOGY: Does not move extremities  SKIN: WANDER AV    LABS:                        12.1   5.90  )-----------( 183      ( 28 Apr 2024 05:00 )             38.5     04-28    136  |  92<L>  |  32<H>  ----------------------------<  84  4.1   |  26  |  4.18<H>    Ca    8.5      28 Apr 2024 05:00  Phos  4.3     04-28  Mg     3.80     04-28            Urinalysis Basic - ( 28 Apr 2024 05:00 )    Color: x / Appearance: x / SG: x / pH: x  Gluc: 84 mg/dL / Ketone: x  / Bili: x / Urobili: x   Blood: x / Protein: x / Nitrite: x   Leuk Esterase: x / RBC: x / WBC x   Sq Epi: x / Non Sq Epi: x / Bacteria: x        Culture - Blood (collected 27 Apr 2024 05:05)  Source: .Blood Blood-Peripheral  Preliminary Report (28 Apr 2024 11:01):    No growth at 24 hours    Culture - Blood (collected 27 Apr 2024 04:50)  Source: .Blood Blood-Venous  Preliminary Report (28 Apr 2024 11:01):    No growth at 24 hours    Culture - Blood (collected 26 Apr 2024 15:10)  Source: .Blood Blood  Preliminary Report (27 Apr 2024 19:01):    No growth at 24 hours    Culture - Blood (collected 26 Apr 2024 06:00)  Source: .Blood Blood  Gram Stain (27 Apr 2024 08:10):    Growth in aerobic bottle: Gram Positive Cocci in Clusters  Final Report (27 Apr 2024 21:42):    Growth in aerobic bottle: Methicillin Resistant Staphylococcus aureus    See previous culture [id number]        RADIOLOGY & ADDITIONAL TESTS: No new imaging  Results Reviewed: Yes  Imaging Personally Reviewed:  Electrocardiogram Personally Reviewed:    COORDINATION OF CARE:  Care Discussed with Consultants/Other Providers [Y/N]:  Prior or Outpatient Records Reviewed [Y/N]:

## 2024-04-28 NOTE — PROGRESS NOTE ADULT - PROBLEM SELECTOR PLAN 1
Due to MRSA bacteremia  -unclear source of MRSA infection  -CT chest without obvious PNA, did mention a masslike consolidation in LLL most suggestive of atelectasis  -urine culture with no growth  -ID following, appreciate recs  -c/w vancomycin 1000mg after HD on HD days M/W/F  -TTE without obvious vegetations noted, may need ISRAEL  -Trend blood cultures q48h until clear

## 2024-04-28 NOTE — PROGRESS NOTE ADULT - ASSESSMENT
69-year-old male with PMH end-stage renal on HD via left aVF, CVA, BKA/AKA, functionally quadriplegic, CAD, HTN, HLD, history of O2 as needed presents for evaluation of vomiting.  Brought from HD, started vomiting while receiving treatment.  Patient unable to endorse any specific complaints, per paperwork AO x 1/0 at baseline.  Patient denies chest pain or difficulty breathing, unable to really clarify about abdominal pain. Nephrology consulted for dialysis needs.    A/P   ESRD  Center: [place]  Nephrologist: Dr. Navarro   Access: L AVF  MWF schedule   Next HD 4/29/24  Consent obtained from niece, and proxy, Ramonita Vincent via phone [phone]  Renal diet  Monitor BMP     HTN   BP optimal  UF with HD   Monitor BP     Anemia  Above goal   Monitor Hb     CKD-MBD  Get PO4, PTH   Monitor daily     Nausea/Vomiting  CT A/P shows constipation and wall thickening, worrisome for stercoral colitis  Colorectal surgery f/u

## 2024-04-28 NOTE — PROGRESS NOTE ADULT - PROBLEM SELECTOR PLAN 6
Need to verify Percocet - noted on HD paperwork but not seen on [place]    Added oxy 5 mg q6h prn for mod to severe pain  DVT ppx: heparin bid   Diet: Passed dysphagia screen dysphagia screen,. Renal DASH TLC easy to chew  Plan of care discussed with hayden Vincent [phone] on 4/26. She reports that pt has not yet had HCP paperwork filled out. She would like to come into the hospital to have papers filled out and to discuss with pt

## 2024-04-28 NOTE — PROGRESS NOTE ADULT - SUBJECTIVE AND OBJECTIVE BOX
[facility]   MD MARIBELL CARRION MD RUORU WONG, PA    TEL:  OFFICE: [phone]      RENAL FOLLOW UP NOTE-- Date of Service ;; 04-28-24 @ 07:41  --------------------------------------------------------------------------------  HPI:  Pt seen and examined at bedside          PAST HISTORY  --------------------------------------------------------------------------------  No significant changes to PMH, PSH, FHx, SHx, unless otherwise noted    ALLERGIES & MEDICATIONS  --------------------------------------------------------------------------------  Allergies    No Known Allergies    Intolerances      Standing Inpatient Medications  ascorbic acid 500 milliGRAM(s) Oral daily  aspirin enteric coated 81 milliGRAM(s) Oral daily  atorvastatin 40 milliGRAM(s) Oral at bedtime  chlorhexidine 2% Cloths 1 Application(s) Topical daily  furosemide    Tablet 40 milliGRAM(s) Oral two times a day  heparin   Injectable 5000 Unit(s) SubCutaneous every 12 hours  lactobacillus acidophilus 1 Tablet(s) Oral daily  lactulose Syrup 10 Gram(s) Oral daily  midodrine. 5 milliGRAM(s) Oral <User Schedule>  Nephro-hannah 1 Tablet(s) Oral daily  pantoprazole    Tablet 40 milliGRAM(s) Oral before breakfast  polyethylene glycol 3350 17 Gram(s) Oral daily  senna 2 Tablet(s) Oral at bedtime    PRN Inpatient Medications  acetaminophen     Tablet .. 650 milliGRAM(s) Oral every 6 hours PRN  aluminum hydroxide/magnesium hydroxide/simethicone Suspension 30 milliLiter(s) Oral every 4 hours PRN  melatonin 3 milliGRAM(s) Oral at bedtime PRN  ondansetron Injectable 4 milliGRAM(s) IV Push every 8 hours PRN  oxyCODONE    IR 5 milliGRAM(s) Oral every 6 hours PRN  sodium chloride 0.9% Bolus. 100 milliLiter(s) IV Bolus every 5 minutes PRN      REVIEW OF SYSTEMS  --------------------------------------------------------------------------------  General: no fever  CVS: no chest pain  RESP: no sob, no cough  ABD: no abdominal pain  : no dysuria,  MSK: no edema     VITALS/PHYSICAL EXAM  --------------------------------------------------------------------------------  T(C): 36.7 (04-28-24 @ 06:30), Max: 36.7 (04-27-24 @ 17:01)  HR: 81 (04-28-24 @ 06:30) (72 - 84)  BP: 143/72 (04-28-24 @ 06:30) (121/63 - 143/72)  RR: 18 (04-28-24 @ 06:30) (16 - 18)  SpO2: 98% (04-28-24 @ 06:30) (96% - 100%)  Wt(kg): --        Physical Exam:  	Gen: NAD  	HEENT: MMM  	Pulm: CTA B/L  	CV: S1S2  	Abd: Soft, +BS  	Ext: No LE edema B/L              RBKA, LAKA              Neuro: Awake  	Skin: Warm and Dry   	Vascular access: [facility]             LABS/STUDIES  --------------------------------------------------------------------------------              12.1   5.90  >-----------<  183      [04-28-24 @ 05:00]              38.5     136  |  92  |  32  ----------------------------<  84      [04-28-24 @ 05:00]  4.1   |  26  |  4.18        Ca     8.5     [04-28-24 @ 05:00]      Mg     3.80     [04-28-24 @ 05:00]      Phos  4.3     [04-28-24 @ 05:00]            Creatinine Trend:  SCr 4.18 [04-28 @ 05:00]  SCr 3.39 [04-27 @ 16:47]  SCr 2.78 [04-27 @ 04:50]  SCr 4.83 [04-26 @ 05:15]  SCr 3.31 [04-25 @ 12:15]    Urinalysis - [04-28-24 @ 05:00]      Color  / Appearance  / SG  / pH       Gluc 84 / Ketone   / Bili  / Urobili        Blood  / Protein  / Leuk Est  / Nitrite       RBC  / WBC  / Hyaline  / Gran  / Sq Epi  / Non Sq Epi  / Bacteria       PTH -- (Ca --)      [04-28-24 @ 05:00]   155  HbA1c 4.2      [07-19-19 @ 09:56]  TSH 1.790      [06-23-23 @ 11:51]  Lipid: chol 119, TG 66, HDL 46, LDL --      [10-07-23 @ 09:30]

## 2024-04-29 ENCOUNTER — TRANSCRIPTION ENCOUNTER (OUTPATIENT)
Age: 69
End: 2024-04-29

## 2024-04-29 LAB
ANION GAP SERPL CALC-SCNC: 14 MMOL/L — SIGNIFICANT CHANGE UP (ref 7–14)
BASOPHILS # BLD AUTO: 0.05 K/UL — SIGNIFICANT CHANGE UP (ref 0–0.2)
BASOPHILS NFR BLD AUTO: 1 % — SIGNIFICANT CHANGE UP (ref 0–2)
BUN SERPL-MCNC: 41 MG/DL — HIGH (ref 7–23)
CALCIUM SERPL-MCNC: 8.1 MG/DL — LOW (ref 8.4–10.5)
CHLORIDE SERPL-SCNC: 95 MMOL/L — LOW (ref 98–107)
CO2 SERPL-SCNC: 26 MMOL/L — SIGNIFICANT CHANGE UP (ref 22–31)
CREAT SERPL-MCNC: 5.51 MG/DL — HIGH (ref 0.5–1.3)
CULTURE RESULTS: ABNORMAL
EGFR: 10 ML/MIN/1.73M2 — LOW
EOSINOPHIL # BLD AUTO: 0.55 K/UL — HIGH (ref 0–0.5)
EOSINOPHIL NFR BLD AUTO: 11.5 % — HIGH (ref 0–6)
GLUCOSE BLDC GLUCOMTR-MCNC: 104 MG/DL — HIGH (ref 70–99)
GLUCOSE BLDC GLUCOMTR-MCNC: 109 MG/DL — HIGH (ref 70–99)
GLUCOSE BLDC GLUCOMTR-MCNC: 79 MG/DL — SIGNIFICANT CHANGE UP (ref 70–99)
GLUCOSE BLDC GLUCOMTR-MCNC: 91 MG/DL — SIGNIFICANT CHANGE UP (ref 70–99)
GLUCOSE BLDC GLUCOMTR-MCNC: 94 MG/DL — SIGNIFICANT CHANGE UP (ref 70–99)
GLUCOSE SERPL-MCNC: 69 MG/DL — LOW (ref 70–99)
GRAM STN FLD: ABNORMAL
HCT VFR BLD CALC: 34.9 % — LOW (ref 39–50)
HGB BLD-MCNC: 11.1 G/DL — LOW (ref 13–17)
IANC: 2.74 K/UL — SIGNIFICANT CHANGE UP (ref 1.8–7.4)
IMM GRANULOCYTES NFR BLD AUTO: 0.2 % — SIGNIFICANT CHANGE UP (ref 0–0.9)
LYMPHOCYTES # BLD AUTO: 0.85 K/UL — LOW (ref 1–3.3)
LYMPHOCYTES # BLD AUTO: 17.8 % — SIGNIFICANT CHANGE UP (ref 13–44)
MAGNESIUM SERPL-MCNC: 4.2 MG/DL — HIGH (ref 1.6–2.6)
MCHC RBC-ENTMCNC: 27.4 PG — SIGNIFICANT CHANGE UP (ref 27–34)
MCHC RBC-ENTMCNC: 31.8 GM/DL — LOW (ref 32–36)
MCV RBC AUTO: 86.2 FL — SIGNIFICANT CHANGE UP (ref 80–100)
MONOCYTES # BLD AUTO: 0.57 K/UL — SIGNIFICANT CHANGE UP (ref 0–0.9)
MONOCYTES NFR BLD AUTO: 11.9 % — SIGNIFICANT CHANGE UP (ref 2–14)
NEUTROPHILS # BLD AUTO: 2.74 K/UL — SIGNIFICANT CHANGE UP (ref 1.8–7.4)
NEUTROPHILS NFR BLD AUTO: 57.6 % — SIGNIFICANT CHANGE UP (ref 43–77)
NRBC # BLD: 0 /100 WBCS — SIGNIFICANT CHANGE UP (ref 0–0)
NRBC # FLD: 0 K/UL — SIGNIFICANT CHANGE UP (ref 0–0)
PHOSPHATE SERPL-MCNC: 4.6 MG/DL — HIGH (ref 2.5–4.5)
PLATELET # BLD AUTO: 197 K/UL — SIGNIFICANT CHANGE UP (ref 150–400)
POTASSIUM SERPL-MCNC: 4.4 MMOL/L — SIGNIFICANT CHANGE UP (ref 3.5–5.3)
POTASSIUM SERPL-SCNC: 4.4 MMOL/L — SIGNIFICANT CHANGE UP (ref 3.5–5.3)
RBC # BLD: 4.05 M/UL — LOW (ref 4.2–5.8)
RBC # FLD: 19.8 % — HIGH (ref 10.3–14.5)
SODIUM SERPL-SCNC: 135 MMOL/L — SIGNIFICANT CHANGE UP (ref 135–145)
SPECIMEN SOURCE: SIGNIFICANT CHANGE UP
VANCOMYCIN FLD-MCNC: 22.8 UG/ML — SIGNIFICANT CHANGE UP
WBC # BLD: 4.77 K/UL — SIGNIFICANT CHANGE UP (ref 3.8–10.5)
WBC # FLD AUTO: 4.77 K/UL — SIGNIFICANT CHANGE UP (ref 3.8–10.5)

## 2024-04-29 PROCEDURE — 99232 SBSQ HOSP IP/OBS MODERATE 35: CPT

## 2024-04-29 RX ORDER — VANCOMYCIN HCL 1 G
750 VIAL (EA) INTRAVENOUS ONCE
Refills: 0 | Status: COMPLETED | OUTPATIENT
Start: 2024-04-29 | End: 2024-04-29

## 2024-04-29 RX ORDER — ACETAMINOPHEN 500 MG
975 TABLET ORAL ONCE
Refills: 0 | Status: DISCONTINUED | OUTPATIENT
Start: 2024-04-29 | End: 2024-05-01

## 2024-04-29 RX ORDER — MUPIROCIN 20 MG/G
1 OINTMENT TOPICAL
Refills: 0 | Status: COMPLETED | OUTPATIENT
Start: 2024-04-29 | End: 2024-05-04

## 2024-04-29 RX ADMIN — Medication 250 MILLIGRAM(S): at 18:09

## 2024-04-29 RX ADMIN — ATORVASTATIN CALCIUM 40 MILLIGRAM(S): 80 TABLET, FILM COATED ORAL at 21:31

## 2024-04-29 RX ADMIN — HEPARIN SODIUM 5000 UNIT(S): 5000 INJECTION INTRAVENOUS; SUBCUTANEOUS at 18:09

## 2024-04-29 RX ADMIN — Medication 40 MILLIGRAM(S): at 18:09

## 2024-04-29 RX ADMIN — HEPARIN SODIUM 5000 UNIT(S): 5000 INJECTION INTRAVENOUS; SUBCUTANEOUS at 05:06

## 2024-04-29 RX ADMIN — OXYCODONE HYDROCHLORIDE 5 MILLIGRAM(S): 5 TABLET ORAL at 12:00

## 2024-04-29 RX ADMIN — Medication 40 MILLIGRAM(S): at 05:06

## 2024-04-29 RX ADMIN — OXYCODONE HYDROCHLORIDE 5 MILLIGRAM(S): 5 TABLET ORAL at 11:30

## 2024-04-29 RX ADMIN — PANTOPRAZOLE SODIUM 40 MILLIGRAM(S): 20 TABLET, DELAYED RELEASE ORAL at 05:06

## 2024-04-29 RX ADMIN — MUPIROCIN 1 APPLICATION(S): 20 OINTMENT TOPICAL at 18:27

## 2024-04-29 RX ADMIN — SENNA PLUS 2 TABLET(S): 8.6 TABLET ORAL at 21:31

## 2024-04-29 NOTE — DISCHARGE NOTE PROVIDER - ATTENDING DISCHARGE PHYSICAL EXAMINATION:
VM left for niece 5/16-Ramonita Vincent [phone]    GENERAL: NAD  CHEST/LUNG: CTAB, breathing non-labored  COR: RR, no mrcg  ABD: Soft, ND/NT, +BS  PSYCH: AAOx 1  SKIN: No rashes or lesions  EXT: wwp, s/p b/l AKA and BKA      VM left for niece 5/16-Ramonita Vincent [phone]

## 2024-04-29 NOTE — PROGRESS NOTE ADULT - ASSESSMENT
69-year-old male with PMH end-stage renal on HD via left aVF, CVA, BKA/AKA, functionally quadriplegic, CAD, HTN, HLD, history of O2 as needed presents for evaluation of vomiting.  Brought from HD, started vomiting while receiving treatment.  Patient unable to endorse any specific complaints, per paperwork AO x 1/0 at baseline.  Patient denies chest pain or difficulty breathing, unable to really clarify about abdominal pain. Nephrology consulted for dialysis needs.    A/P   ESRD  Center: [place]  Nephrologist: Dr. Navarro   Access: L AVF  MWF schedule   HD today  Consent obtained from niece, and proxy, Ramonita Vincent via phone [phone]  Renal diet  Monitor BMP     HTN   BP fluctuating  UF with HD   Monitor BP     Anemia  Above goal   Monitor Hb     CKD-MBD   - acceptable   PO4 acceptable  Monitor Ca, PO4 daily     Nausea/Vomiting  CT A/P shows constipation and wall thickening, worrisome for stercoral colitis  s/p disempaction  Colorectal surgery f/u

## 2024-04-29 NOTE — PROGRESS NOTE ADULT - SUBJECTIVE AND OBJECTIVE BOX
[facility]   MD MARIBELL CARRION MD MARIA SANTIAGO, NP    TEL:  OFFICE: [phone]  From 5pm-7am Answering Service [phone]    -- RENAL FOLLOW UP NOTE ---Date of Service 04-29-24 @ 12:56    Patient is a 69y old  Male who presents with a chief complaint of nausea and vomiting     Patient seen and examined at bedside. No chest pain/sob    VITALS:  T(F): 98.1 (04-29-24 @ 10:58), Max: 98.9 (04-28-24 @ 17:00)  HR: 72 (04-29-24 @ 10:58)  BP: 161/59 (04-29-24 @ 10:58)  RR: 17 (04-29-24 @ 10:58)  SpO2: 97% (04-29-24 @ 10:13)  Wt(kg): --    04-28 @ 07:01  -  04-29 @ 07:00  --------------------------------------------------------  IN: 310 mL / OUT: 0 mL / NET: 310 mL          PHYSICAL EXAM:  General: NAD  Neck: No JVD  Respiratory: CTAB, no wheezes, rales or rhonchi  Cardiovascular: S1, S2, RRR  Gastrointestinal: BS+, soft, NT/ND  Extremities: L AKA, R BKA    [facility] Medications:   MEDICATIONS  (STANDING):  acetaminophen     Tablet .. 975 milliGRAM(s) Oral once  ascorbic acid 500 milliGRAM(s) Oral daily  aspirin enteric coated 81 milliGRAM(s) Oral daily  atorvastatin 40 milliGRAM(s) Oral at bedtime  chlorhexidine 2% Cloths 1 Application(s) Topical daily  furosemide    Tablet 40 milliGRAM(s) Oral two times a day  heparin   Injectable 5000 Unit(s) SubCutaneous every 12 hours  lactobacillus acidophilus 1 Tablet(s) Oral daily  lactulose Syrup 10 Gram(s) Oral daily  midodrine. 5 milliGRAM(s) Oral <User Schedule>  mupirocin 2% Ointment 1 Application(s) Both Nostrils two times a day  Nephro-hannah 1 Tablet(s) Oral daily  pantoprazole    Tablet 40 milliGRAM(s) Oral before breakfast  polyethylene glycol 3350 17 Gram(s) Oral daily  senna 2 Tablet(s) Oral at bedtime  vancomycin  IVPB 750 milliGRAM(s) IV Intermittent once      LABS:  04-29    135  |  95<L>  |  41<H>  ----------------------------<  69<L>  4.4   |  26  |  5.51<H>    Ca    8.1<L>      29 Apr 2024 05:05  Phos  4.6     04-29  Mg     4.20     04-29      Creatinine Trend: 5.51 <--, 4.18 <--, 3.39 <--, 2.78 <--, 4.83 <--, 3.31 <--, 3.07 <--    Phosphorus: 4.6 mg/dL (04-29 @ 05:05)                              11.1   4.77  )-----------( 197      ( 29 Apr 2024 05:05 )             34.9     Urine Studies:  Urinalysis - [04-29-24 @ 05:05]      Color  / Appearance  / SG  / pH       Gluc 69 / Ketone   / Bili  / Urobili        Blood  / Protein  / Leuk Est  / Nitrite       RBC  / WBC  / Hyaline  / Gran  / Sq Epi  / Non Sq Epi  / Bacteria       PTH -- (Ca --)      [04-28-24 @ 05:00]   155  HbA1c 4.2      [07-19-19 @ 09:56]  TSH 1.790      [06-23-23 @ 11:51]  Lipid: chol 119, TG 66, HDL 46, LDL --      [10-07-23 @ 09:30]        RADIOLOGY & ADDITIONAL STUDIES:

## 2024-04-29 NOTE — PROGRESS NOTE ADULT - SUBJECTIVE AND OBJECTIVE BOX
Patient is a 69y old  Male who presents with a chief complaint of nausea and vomiting (29 Apr 2024 12:56)      SUBJECTIVE / OVERNIGHT EVENTS:    No events overnight. This AM, CATRACHO PATRICK feels okay. Has no n/v/d/cp/sob.      MEDICATIONS  (STANDING):  acetaminophen     Tablet .. 975 milliGRAM(s) Oral once  ascorbic acid 500 milliGRAM(s) Oral daily  aspirin enteric coated 81 milliGRAM(s) Oral daily  atorvastatin 40 milliGRAM(s) Oral at bedtime  chlorhexidine 2% Cloths 1 Application(s) Topical daily  furosemide    Tablet 40 milliGRAM(s) Oral two times a day  heparin   Injectable 5000 Unit(s) SubCutaneous every 12 hours  lactobacillus acidophilus 1 Tablet(s) Oral daily  lactulose Syrup 10 Gram(s) Oral daily  midodrine. 5 milliGRAM(s) Oral <User Schedule>  mupirocin 2% Ointment 1 Application(s) Both Nostrils two times a day  Nephro-hannah 1 Tablet(s) Oral daily  pantoprazole    Tablet 40 milliGRAM(s) Oral before breakfast  polyethylene glycol 3350 17 Gram(s) Oral daily  senna 2 Tablet(s) Oral at bedtime  vancomycin  IVPB 750 milliGRAM(s) IV Intermittent once    MEDICATIONS  (PRN):  acetaminophen     Tablet .. 650 milliGRAM(s) Oral every 6 hours PRN Temp greater or equal to 38C (100.4F), Mild Pain (1 - 3)  aluminum hydroxide/magnesium hydroxide/simethicone Suspension 30 milliLiter(s) Oral every 4 hours PRN Dyspepsia  melatonin 3 milliGRAM(s) Oral at bedtime PRN Insomnia  ondansetron Injectable 4 milliGRAM(s) IV Push every 8 hours PRN Nausea and/or Vomiting  oxyCODONE    IR 5 milliGRAM(s) Oral every 6 hours PRN moderate to severe pain  sodium chloride 0.9% Bolus. 100 milliLiter(s) IV Bolus every 5 minutes PRN SBP LESS THAN or EQUAL to 80 mmHg      PHYSICAL EXAM:  T(C): 36.7 (04-29-24 @ 10:58), Max: 37.2 (04-28-24 @ 17:00)  HR: 72 (04-29-24 @ 10:58) (72 - 81)  BP: 161/59 (04-29-24 @ 10:58) (129/61 - 161/59)  RR: 17 (04-29-24 @ 10:58) (17 - 20)  SpO2: 97% (04-29-24 @ 10:13) (96% - 98%)  I&O's Summary    28 Apr 2024 07:01  -  29 Apr 2024 07:00  --------------------------------------------------------  IN: 310 mL / OUT: 0 mL / NET: 310 mL      GENERAL: NAD, lying in bed, undergoing HD   HEAD:  Atraumatic, Normocephalic, MMM  CHEST/LUNG: No use of accessory muscles, CTAB, breathing non-labored  COR: RR, no mrcg  ABD: Soft, ND/NT, +BS  PSYCH: AAOxself  NEUROLOGY: CN II-XII grossly intact, moving all extremities  SKIN: No rashes or lesions; LUE AVF  EXT: wwp, no cce    LABS:  CAPILLARY BLOOD GLUCOSE      POCT Blood Glucose.: 94 mg/dL (29 Apr 2024 11:12)  POCT Blood Glucose.: 79 mg/dL (29 Apr 2024 07:21)  POCT Blood Glucose.: 111 mg/dL (28 Apr 2024 21:14)  POCT Blood Glucose.: 128 mg/dL (28 Apr 2024 16:41)                          11.1   4.77  )-----------( 197      ( 29 Apr 2024 05:05 )             34.9     04-29    135  |  95<L>  |  41<H>  ----------------------------<  69<L>  4.4   |  26  |  5.51<H>    Ca    8.1<L>      29 Apr 2024 05:05  Phos  4.6     04-29  Mg     4.20     04-29            Urinalysis Basic - ( 29 Apr 2024 05:05 )    Color: x / Appearance: x / SG: x / pH: x  Gluc: 69 mg/dL / Ketone: x  / Bili: x / Urobili: x   Blood: x / Protein: x / Nitrite: x   Leuk Esterase: x / RBC: x / WBC x   Sq Epi: x / Non Sq Epi: x / Bacteria: x        Culture - Blood (collected 27 Apr 2024 05:05)  Source: .Blood Blood-Peripheral  Gram Stain (29 Apr 2024 05:55):    Growth in anaerobic bottle: Gram Positive Cocci in Clusters  Preliminary Report (29 Apr 2024 05:55):    Growth in anaerobic bottle: Gram Positive Cocci in Clusters    Culture - Blood (collected 27 Apr 2024 04:50)  Source: .Blood Blood-Venous  Preliminary Report (29 Apr 2024 11:01):    No growth at 48 Hours    Culture - Blood (collected 26 Apr 2024 15:10)  Source: .Blood Blood  Preliminary Report (28 Apr 2024 19:01):    No growth at 48 Hours        RADIOLOGY & ADDITIONAL TESTS:    Telemetry Personally Reviewed -     Imaging Personally Reviewed -     Imaging Reviewed -     Consultant(s) Notes Reviewed -       Care Discussed with Consultants/Other Providers -

## 2024-04-29 NOTE — DISCHARGE NOTE PROVIDER - DETAILS OF MALNUTRITION DIAGNOSIS/DIAGNOSES
This patient has been assessed with a concern for Malnutrition and was treated during this hospitalization for the following Nutrition diagnosis/diagnoses:     -  05/07/2024: Moderate protein-calorie malnutrition

## 2024-04-29 NOTE — DISCHARGE NOTE PROVIDER - NSFOLLOWUPCLINICS_GEN_ALL_ED_FT
[facility] Infectious Disease  Infectious Disease  [address], [place] [address]  Phone: [phone]  Fax:      [facility] - Infectious Disease  Infectious Disease  [address], [place] [address]  Phone: [phone]  Fax:     Cardiology at [facility]  Cardiology  [address] [place] [id number]  Phone: [phone]  Fax:

## 2024-04-29 NOTE — DISCHARGE NOTE PROVIDER - NSDCMRMEDTOKEN_GEN_ALL_CORE_FT
acetaminophen 650 mg oral tablet: orally every 6 hours as needed for pain  aspirin 81 mg oral tablet: 81 milligram(s) orally once a day  atorvastatin 40 mg oral tablet: 1 tab(s) orally once a day (at bedtime)  calcium acetate 667 mg oral tablet: 1 tab(s) orally 3 times a day  furosemide 40 mg oral tablet: 1 tab(s) orally 2 times a day  lactobacillus acidophilus oral capsule: 1 cap(s) orally once a day  lactulose 10 g oral powder for reconstitution: 1 each orally once a day  midodrine 5 mg oral tablet: 2 tab(s) orally 3 times a week  NexIUM 40 mg oral delayed release capsule: 1 cap(s) orally once a day  Senna 8.6 mg oral tablet: 1 tab(s) orally once a day (at bedtime)   acetaminophen 650 mg oral tablet: orally every 6 hours as needed for pain  aspirin 81 mg oral tablet: 81 milligram(s) orally once a day  atorvastatin 40 mg oral tablet: 1 tab(s) orally once a day (at bedtime)  calcium acetate 667 mg oral tablet: 1 tab(s) orally 3 times a day  Entresto 24 mg-26 mg oral tablet: 1 tab(s) orally 2 times a day  furosemide 40 mg oral tablet: 1 tab(s) orally 2 times a day  lactobacillus acidophilus oral capsule: 1 cap(s) orally once a day  lactulose 10 g oral powder for reconstitution: 1 each orally once a day  midodrine 5 mg oral tablet: 2 tab(s) orally 3 times a week  NexIUM 40 mg oral delayed release capsule: 1 cap(s) orally once a day  Senna 8.6 mg oral tablet: 1 tab(s) orally once a day (at bedtime)   acetaminophen 650 mg oral tablet: orally every 6 hours as needed for pain  aspirin 81 mg oral tablet: 81 milligram(s) orally once a day  atorvastatin 40 mg oral tablet: 1 tab(s) orally once a day (at bedtime)  calcium acetate 667 mg oral tablet: 1 tab(s) orally 3 times a day  Draw weekly CBC , BMP and vancomycin through ( before HD ) and fax it to ID office  fax # [phone]: fax # [phone]  Entresto 24 mg-26 mg oral tablet: 1 tab(s) orally 2 times a day  furosemide 40 mg oral tablet: 1 tab(s) orally 2 times a day  lactobacillus acidophilus oral capsule: 1 cap(s) orally once a day  lactulose 10 g oral powder for reconstitution: 1 each orally once a day  midodrine 5 mg oral tablet: 2 tab(s) orally 3 times a week  NexIUM 40 mg oral delayed release capsule: 1 cap(s) orally once a day  Senna 8.6 mg oral tablet: 1 tab(s) orally once a day (at bedtime)  vancomycin 500 mg/100 mL-D5% intravenous solution: 500 milligram(s) intravenous 3 times a week infuse after hemodialysis on Mondays , Wednesdays and Fridays over 60 minutes until 5/27  ( starting from May 13 ending on May 27)   acetaminophen 650 mg oral tablet: orally every 6 hours as needed for pain  ascorbic acid 500 mg oral tablet: 1 tab(s) orally once a day  aspirin 81 mg oral tablet: 81 milligram(s) orally once a day  atorvastatin 40 mg oral tablet: 1 tab(s) orally once a day (at bedtime)  carvedilol 6.25 mg oral tablet: 1 tab(s) orally every 12 hours  lactobacillus acidophilus oral capsule: 1 cap(s) orally once a day  lactulose 10 g oral powder for reconstitution: 1 each orally once a day  pantoprazole 40 mg oral delayed release tablet: 1 tab(s) orally once a day (before a meal)  polyethylene glycol 3350 oral powder for reconstitution: 17 gram(s) orally once a day  Senna 8.6 mg oral tablet: 1 tab(s) orally once a day (at bedtime)  vancomycin 500 mg/100 mL-D5% intravenous solution: 500 milligram(s) intravenous 3 times a week infuse after hemodialysis on Mondays , Wednesdays and Fridays over 60 minutes until 5/27  ( starting from May 13 ending on May 27)

## 2024-04-29 NOTE — DISCHARGE NOTE PROVIDER - NSDCCPCAREPLAN_GEN_ALL_CORE_FT
PRINCIPAL DISCHARGE DIAGNOSIS  Diagnosis: MRSA bacteremia  Assessment and Plan of Treatment: You were admitted to the hospital with MRSA bacteria growing in your blood. You were evaluated by an infectious diseases specialist. Your condition is improving with IV antibiotics. You will continue to receive antibiotics for several weeks during your dialysis sessions.     PRINCIPAL DISCHARGE DIAGNOSIS  Diagnosis: MRSA bacteremia  Assessment and Plan of Treatment: You presented to the hospital with vomiting and were admitted for sepsis (infection) due to a bacteria (MRSA). You were started on IV antibiotics per the recommendation of the infectious disease team. CT of your chest did not show a pneumonia, Echocardiogram did not show a source of infection. Please continue to take your medications (IV Vancomycin) as prescribed on dialysis days until 5/27. Please follow up with the infectious disease team for further management. Please return to the hospital should you experience any new or worsening symptoms      SECONDARY DISCHARGE DIAGNOSES  Diagnosis: ESRD on hemodialysis  Assessment and Plan of Treatment: Please continue to follow up with the nephrologists for dialysis    Diagnosis: Hypoglycemia  Assessment and Plan of Treatment: You were found to have decreased sugar levels during your hospital stay (hypoglycemia).     PRINCIPAL DISCHARGE DIAGNOSIS  Diagnosis: MRSA bacteremia  Assessment and Plan of Treatment: You presented to the hospital with vomiting and were admitted for sepsis (infection) due to a bacteria (MRSA). You were started on IV antibiotics per the recommendation of the infectious disease team. CT of your chest did not show a pneumonia, Echocardiogram did not show a source of infection. Please continue to take your medications (IV Vancomycin) as prescribed on dialysis days until 5/27. Weekly CBC, BMP, vancomycin trough should be obtained. Please follow up with the infectious disease team for further management. Please return to the hospital should you experience any new or worsening symptoms      SECONDARY DISCHARGE DIAGNOSES  Diagnosis: ESRD on hemodialysis  Assessment and Plan of Treatment: Please continue to follow up with the nephrologists for dialysis    Diagnosis: Hypoglycemia  Assessment and Plan of Treatment: You were found to have decreased sugar levels during your hospital stay (hypoglycemia). The endocrinologists were consulted and recommended that your low sugar levels were likely due to your renal dysfunction as well as decreased eating. Please continue to eat a healthy diet and monitor your sugars following your discharge.        PRINCIPAL DISCHARGE DIAGNOSIS  Diagnosis: MRSA bacteremia  Assessment and Plan of Treatment: You presented to the hospital with vomiting and were admitted for sepsis (infection) due to a bacteria (MRSA). You were started on IV antibiotics per the recommendation of the infectious disease team. CT of your chest did not show a pneumonia, Echocardiogram did not show a source of infection. Please continue to take your medications (IV Vancomycin) as prescribed on dialysis days until 5/27. Weekly CBC, BMP, vancomycin trough should be obtained. Please follow up with the infectious disease team for further management. Please return to the hospital should you experience any new or worsening symptoms      SECONDARY DISCHARGE DIAGNOSES  Diagnosis: ESRD on hemodialysis  Assessment and Plan of Treatment: Please continue to follow up with the nephrologists for dialysis    Diagnosis: Hypoglycemia  Assessment and Plan of Treatment: You were found to have decreased sugar levels during your hospital stay (hypoglycemia). The endocrinologists were consulted and recommended that your low sugar levels were likely due to your renal dysfunction as well as decreased eating. Please continue to eat a healthy diet and monitor your sugars following your discharge.       Diagnosis: Heart failure  Assessment and Plan of Treatment: You have a history of heart failure and were found to have decreased heart function on echocardiogram. Your medications were adjusted. Please follow up with the heart failure team following your discharge for further management

## 2024-04-29 NOTE — DISCHARGE NOTE PROVIDER - CARE PROVIDER_API CALL
Hector Umanzor  Infectious Disease  [address] [place] [id number]  Phone: [phone]  Fax: [phone]  Follow Up Time:

## 2024-04-29 NOTE — DISCHARGE NOTE PROVIDER - NSDCFUSCHEDAPPT_GEN_ALL_CORE_FT
Derick Stewart  [facility] [phone]  Scheduled Appointment: 05/21/2024    [facility] [phone]  Scheduled Appointment: 05/21/2024    [facility] [phone]  Scheduled Appointment: 06/21/2024

## 2024-04-29 NOTE — DISCHARGE NOTE PROVIDER - NSDCFUADDAPPT_GEN_ALL_CORE_FT
APPTS ARE READY TO BE MADE: [x ] YES    Best Family or Patient Contact (if needed):    Additional Information about above appointments (if needed):    1: Infectious disease  2:   3:     Other comments or requests:    APPTS ARE READY TO BE MADE: [x ] YES    Best Family or Patient Contact (if needed):    Additional Information about above appointments (if needed):    1: Infectious disease  2: Cardiology  3:     Other comments or requests:    APPTS ARE READY TO BE MADE: [x ] YES    Best Family or Patient Contact (if needed):    Additional Information about above appointments (if needed):    1: Infectious disease  2: Cardiology  3:     Other comments or requests:   Patient is being discharged to rehab/hospice. Caregiver will arrange follow up. - robinson davis APPTS ARE READY TO BE MADE: [x ] YES    Best Family or Patient Contact (if needed):    Additional Information about above appointments (if needed):    1: Infectious disease  2: Cardiology  3:     Other comments or requests: .  Patient is being discharged to rehab/hospice. Caregiver will arrange follow up. - robinson davis

## 2024-04-29 NOTE — PROGRESS NOTE ADULT - PROBLEM SELECTOR PLAN 6
Need to verify Percocet - noted on HD paperwork but [place]    Added oxy 5 mg q6h prn for mod to severe pain  DVT ppx: heparin bid   Diet: Passed dysphagia screen dysphagia screen,. Renal DASH TLC easy to chew  Plan of care discussed with hayden Vincent [phone] on 4/29. All questions answered.

## 2024-04-29 NOTE — PROGRESS NOTE ADULT - SUBJECTIVE AND OBJECTIVE BOX
Infectious Diseases Follow Up:    Patient is a 69y old  Male who presents with a chief complaint of nausea and vomiting (28 Apr 2024 12:01)      Interval History/ROS:  BCx from 4/27 still positive, pt more lethargic today     Allergies  No Known Allergies        ANTIMICROBIALS:      Current Abx:     Previous Abx     OTHER MEDS:  MEDICATIONS  (STANDING):  acetaminophen     Tablet .. 650 every 6 hours PRN  acetaminophen     Tablet .. 975 once  aluminum hydroxide/magnesium hydroxide/simethicone Suspension 30 every 4 hours PRN  aspirin enteric coated 81 daily  atorvastatin 40 at bedtime  furosemide    Tablet 40 two times a day  heparin   Injectable 5000 every 12 hours  lactulose Syrup 10 daily  melatonin 3 at bedtime PRN  midodrine. 5 <User Schedule>  ondansetron Injectable 4 every 8 hours PRN  oxyCODONE    IR 5 every 6 hours PRN  pantoprazole    Tablet 40 before breakfast  polyethylene glycol 3350 17 daily  senna 2 at bedtime      Vital Signs Last 24 Hrs  T(C): 36.5 (29 Apr 2024 10:13), Max: 37.2 (28 Apr 2024 17:00)  T(F): 97.7 (29 Apr 2024 10:13), Max: 98.9 (28 Apr 2024 17:00)  HR: 75 (29 Apr 2024 10:13) (73 - 81)  BP: 149/70 (29 Apr 2024 10:13) (129/61 - 149/70)  BP(mean): --  RR: 20 (29 Apr 2024 10:13) (18 - 20)  SpO2: 97% (29 Apr 2024 10:13) (96% - 98%)    Parameters below as of 29 Apr 2024 10:13  Patient On (Oxygen Delivery Method): room air        PHYSICAL EXAM:  GENERAL: NAD, well-developed  HEAD:  Atraumatic, Normocephalic  EYES: EOMI, conjunctiva and sclera clear=  CHEST/LUNG: Clear to auscultation bilaterally; No wheeze  HEART: Regular rate and rhythm; No murmurs, rubs, or gallops  ABDOMEN: Soft, Nontender, Nondistended; Bowel sounds present  EXTREMITIES:  R BKA, L AKA, stump without any acute findings, no skin breakdown. LUE AVF   PSYCH: AAOx0                        11.1   4.77  )-----------( 197      ( 29 Apr 2024 05:05 )             34.9       04-29    135  |  95<L>  |  41<H>  ----------------------------<  69<L>  4.4   |  26  |  5.51<H>    Ca    8.1<L>      29 Apr 2024 05:05  Phos  4.6     04-29  Mg     4.20     04-29        Urinalysis Basic - ( 29 Apr 2024 05:05 )    Color: x / Appearance: x / SG: x / pH: x  Gluc: 69 mg/dL / Ketone: x  / Bili: x / Urobili: x   Blood: x / Protein: x / Nitrite: x   Leuk Esterase: x / RBC: x / WBC x   Sq Epi: x / Non Sq Epi: x / Bacteria: x        MICROBIOLOGY:  v  .Blood Blood-Peripheral  04-27-24   Growth in anaerobic bottle: Gram Positive Cocci in Clusters  --    Growth in anaerobic bottle: Gram Positive Cocci in Clusters      .Blood Blood-Venous  04-27-24   No growth at 48 Hours  --  --      .Blood Blood  04-26-24   No growth at 48 Hours  --  --      .Blood Blood  04-26-24   Growth in aerobic bottle: Methicillin Resistant Staphylococcus aureus  See previous culture [id number]  --    Growth in aerobic bottle: Gram Positive Cocci in Clusters      Clean Catch Clean Catch (Midstream)  04-24-24   No growth  --  --      .Blood Blood-Peripheral  04-24-24   Growth in aerobic and anaerobic bottles: Methicillin Resistant  Staphylococcus aureus  See previous culture [id number]  --    Growth in aerobic bottle: Gram Positive Cocci in Clusters  Growth in anaerobic bottle: Gram Positive Cocci in Clusters      .Blood Blood-Peripheral  04-24-24   Growth in aerobic and anaerobic bottles: Methicillin Resistant  Staphylococcus aureus  Direct identification is available within approximately 3-5  hours either by Blood Panel Multiplexed PCR or Direct  MALDI-TOF. Details: [email or web address]  --  Blood Culture PCR  Methicillin resistant Staphylococcus aureus                RADIOLOGY:

## 2024-04-29 NOTE — PROGRESS NOTE ADULT - PROBLEM SELECTOR PLAN 1
Due to MRSA bacteremia  -unclear source of MRSA infection  -CT chest without obvious PNA, did mention a masslike consolidation in LLL most suggestive of atelectasis  -urine culture with no growth  -ID following, appreciate recs  -c/w vancomycin 1000mg after HD on HD days M/W/F  -TTE without obvious vegetations noted, will proceed to ISRAEL  -Trend blood cultures q48h until clear, may need tagged WBC scan if Bcx remain positive

## 2024-04-29 NOTE — DISCHARGE NOTE PROVIDER - HOSPITAL COURSE
69 year old M with PMH ESRD on HD via left AVF MWF, CVA, BKA/AKA, functionally quadriplegic, CAD, HTN, HLD, history of O2 as needed presents for evaluation of vomiting.  Pt admitted for sepsis with MRSA bacteremia.     CTAP showed possible sterocoral colitis and ischemia of the rectum- colorectal surgery consulted with no surgical intervention recommended. s/p disimpaction in ED and started on bowel regimen. Blood cultures on admission 4/26 positive for MRSA. Repeat blood cultures 4/27 still positive. ID consulted, pt started on vancomycin on HD days. TTE with no obvious vegetations noted. 69 year old M with PMH ESRD on HD via left AVF MWF, CVA, BKA/AKA, functionally quadriplegic, CAD, HTN, HLD, history of O2 as needed presents for evaluation of vomiting.  Pt admitted for sepsis with MRSA bacteremia.     CTAP showed possible stercoral colitis and ischemia of the rectum- colorectal surgery consulted with no surgical intervention recommended. s/p disimpaction in ED and started on bowel regimen. Blood cultures on admission 4/26 positive for MRSA. ID was consulted and the patient was started on vancomycin on HD days. TTE and ISRAEL were without vegetations. 69 year old M with PMH ESRD on HD via left AVF MWF, CVA, BKA/AKA, functionally quadriplegic, CAD, HTN, HLD, history of O2 as needed presents for evaluation of vomiting.  Pt admitted for sepsis with MRSA bacteremia.     CTAP showed possible stercoral colitis and ischemia of the rectum- colorectal surgery consulted with no surgical intervention recommended. s/p disimpaction in ED and started on bowel regimen. Blood cultures on admission 4/26 positive for MRSA. ID was consulted and the patient was started on vancomycin on HD days. TTE and ISRAEL were without vegetations.     Patient also with hypoglycemic episodes. Endo consulted, likely impaired gluconeogenesis. Continue to encourage PO intake. 69 year old M with PMH ESRD on HD via left AVF MWF, CVA, BKA/AKA, functionally quadriplegic, CAD, HTN, HLD, history of O2 as needed p/w vomiting admitted for sepsis 2/2 MRSA bacteremia. CTAP showed possible stercoral colitis and ischemia of the rectum- colorectal surgery consulted with no surgical intervention recommended, s/p disimpaction  started on bowel regimen. ID consulted given MRSA bacteremia, started on vancomycin w/ HD through 5/27. Unclear source of MRSA infection. CT chest without obvious PNA, did mention a masslike consolidation in LLL most suggestive of atelectasis. Urine culture with no growth. TTE and ISRAEL were without vegetations. Hospital course also sig for hypoglycemic episodes. Endo consulted, per endo likely 2/2 impaired gluconeogenesis. Continue to encourage PO intake. At this time pt is hemodynamically stable for dc.

## 2024-04-30 ENCOUNTER — RESULT REVIEW (OUTPATIENT)
Age: 69
End: 2024-04-30

## 2024-04-30 LAB
ANION GAP SERPL CALC-SCNC: 18 MMOL/L — HIGH (ref 7–14)
BASOPHILS # BLD AUTO: 0.07 K/UL — SIGNIFICANT CHANGE UP (ref 0–0.2)
BASOPHILS NFR BLD AUTO: 1.8 % — SIGNIFICANT CHANGE UP (ref 0–2)
BUN SERPL-MCNC: 23 MG/DL — SIGNIFICANT CHANGE UP (ref 7–23)
CALCIUM SERPL-MCNC: 8.3 MG/DL — LOW (ref 8.4–10.5)
CHLORIDE SERPL-SCNC: 96 MMOL/L — LOW (ref 98–107)
CO2 SERPL-SCNC: 20 MMOL/L — LOW (ref 22–31)
CREAT SERPL-MCNC: 3.93 MG/DL — HIGH (ref 0.5–1.3)
EGFR: 16 ML/MIN/1.73M2 — LOW
EOSINOPHIL # BLD AUTO: 0.4 K/UL — SIGNIFICANT CHANGE UP (ref 0–0.5)
EOSINOPHIL NFR BLD AUTO: 10.1 % — HIGH (ref 0–6)
GLUCOSE BLDC GLUCOMTR-MCNC: 103 MG/DL — HIGH (ref 70–99)
GLUCOSE BLDC GLUCOMTR-MCNC: 106 MG/DL — HIGH (ref 70–99)
GLUCOSE BLDC GLUCOMTR-MCNC: 71 MG/DL — SIGNIFICANT CHANGE UP (ref 70–99)
GLUCOSE BLDC GLUCOMTR-MCNC: 76 MG/DL — SIGNIFICANT CHANGE UP (ref 70–99)
GLUCOSE BLDC GLUCOMTR-MCNC: 76 MG/DL — SIGNIFICANT CHANGE UP (ref 70–99)
GLUCOSE BLDC GLUCOMTR-MCNC: 78 MG/DL — SIGNIFICANT CHANGE UP (ref 70–99)
GLUCOSE BLDC GLUCOMTR-MCNC: 79 MG/DL — SIGNIFICANT CHANGE UP (ref 70–99)
GLUCOSE SERPL-MCNC: 50 MG/DL — CRITICAL LOW (ref 70–99)
HCT VFR BLD CALC: 38.6 % — LOW (ref 39–50)
HGB BLD-MCNC: 12.3 G/DL — LOW (ref 13–17)
IANC: 2 K/UL — SIGNIFICANT CHANGE UP (ref 1.8–7.4)
IMM GRANULOCYTES NFR BLD AUTO: 0.3 % — SIGNIFICANT CHANGE UP (ref 0–0.9)
LYMPHOCYTES # BLD AUTO: 0.83 K/UL — LOW (ref 1–3.3)
LYMPHOCYTES # BLD AUTO: 20.9 % — SIGNIFICANT CHANGE UP (ref 13–44)
MAGNESIUM SERPL-MCNC: 3.4 MG/DL — HIGH (ref 1.6–2.6)
MCHC RBC-ENTMCNC: 27.8 PG — SIGNIFICANT CHANGE UP (ref 27–34)
MCHC RBC-ENTMCNC: 31.9 GM/DL — LOW (ref 32–36)
MCV RBC AUTO: 87.1 FL — SIGNIFICANT CHANGE UP (ref 80–100)
MONOCYTES # BLD AUTO: 0.66 K/UL — SIGNIFICANT CHANGE UP (ref 0–0.9)
MONOCYTES NFR BLD AUTO: 16.6 % — HIGH (ref 2–14)
NEUTROPHILS # BLD AUTO: 2 K/UL — SIGNIFICANT CHANGE UP (ref 1.8–7.4)
NEUTROPHILS NFR BLD AUTO: 50.3 % — SIGNIFICANT CHANGE UP (ref 43–77)
NRBC # BLD: 0 /100 WBCS — SIGNIFICANT CHANGE UP (ref 0–0)
NRBC # FLD: 0 K/UL — SIGNIFICANT CHANGE UP (ref 0–0)
PHOSPHATE SERPL-MCNC: 3.6 MG/DL — SIGNIFICANT CHANGE UP (ref 2.5–4.5)
PLATELET # BLD AUTO: 193 K/UL — SIGNIFICANT CHANGE UP (ref 150–400)
POTASSIUM SERPL-MCNC: 4.6 MMOL/L — SIGNIFICANT CHANGE UP (ref 3.5–5.3)
POTASSIUM SERPL-SCNC: 4.6 MMOL/L — SIGNIFICANT CHANGE UP (ref 3.5–5.3)
RBC # BLD: 4.43 M/UL — SIGNIFICANT CHANGE UP (ref 4.2–5.8)
RBC # FLD: 20 % — HIGH (ref 10.3–14.5)
SODIUM SERPL-SCNC: 134 MMOL/L — LOW (ref 135–145)
VANCOMYCIN TROUGH SERPL-MCNC: 27.9 UG/ML — CRITICAL HIGH (ref 10–20)
WBC # BLD: 3.97 K/UL — SIGNIFICANT CHANGE UP (ref 3.8–10.5)
WBC # FLD AUTO: 3.97 K/UL — SIGNIFICANT CHANGE UP (ref 3.8–10.5)

## 2024-04-30 PROCEDURE — 99232 SBSQ HOSP IP/OBS MODERATE 35: CPT

## 2024-04-30 PROCEDURE — 76376 3D RENDER W/INTRP POSTPROCES: CPT | Mod: 26

## 2024-04-30 PROCEDURE — 93325 DOPPLER ECHO COLOR FLOW MAPG: CPT | Mod: 26,GC

## 2024-04-30 PROCEDURE — 93312 ECHO TRANSESOPHAGEAL: CPT | Mod: 26

## 2024-04-30 PROCEDURE — 93320 DOPPLER ECHO COMPLETE: CPT | Mod: 26,GC

## 2024-04-30 RX ADMIN — MUPIROCIN 1 APPLICATION(S): 20 OINTMENT TOPICAL at 05:10

## 2024-04-30 RX ADMIN — OXYCODONE HYDROCHLORIDE 5 MILLIGRAM(S): 5 TABLET ORAL at 09:37

## 2024-04-30 RX ADMIN — Medication 650 MILLIGRAM(S): at 22:40

## 2024-04-30 RX ADMIN — Medication 650 MILLIGRAM(S): at 05:09

## 2024-04-30 RX ADMIN — CHLORHEXIDINE GLUCONATE 1 APPLICATION(S): 213 SOLUTION TOPICAL at 12:02

## 2024-04-30 RX ADMIN — HEPARIN SODIUM 5000 UNIT(S): 5000 INJECTION INTRAVENOUS; SUBCUTANEOUS at 05:09

## 2024-04-30 RX ADMIN — LACTULOSE 10 GRAM(S): 10 SOLUTION ORAL at 12:01

## 2024-04-30 RX ADMIN — Medication 40 MILLIGRAM(S): at 05:10

## 2024-04-30 RX ADMIN — Medication 650 MILLIGRAM(S): at 21:41

## 2024-04-30 RX ADMIN — Medication 81 MILLIGRAM(S): at 12:01

## 2024-04-30 RX ADMIN — MUPIROCIN 1 APPLICATION(S): 20 OINTMENT TOPICAL at 17:17

## 2024-04-30 RX ADMIN — PANTOPRAZOLE SODIUM 40 MILLIGRAM(S): 20 TABLET, DELAYED RELEASE ORAL at 05:10

## 2024-04-30 RX ADMIN — Medication 1 TABLET(S): at 12:01

## 2024-04-30 RX ADMIN — Medication 40 MILLIGRAM(S): at 13:53

## 2024-04-30 RX ADMIN — Medication 500 MILLIGRAM(S): at 12:01

## 2024-04-30 RX ADMIN — Medication 650 MILLIGRAM(S): at 05:50

## 2024-04-30 RX ADMIN — HEPARIN SODIUM 5000 UNIT(S): 5000 INJECTION INTRAVENOUS; SUBCUTANEOUS at 17:17

## 2024-04-30 RX ADMIN — POLYETHYLENE GLYCOL 3350 17 GRAM(S): 17 POWDER, FOR SOLUTION ORAL at 17:17

## 2024-04-30 RX ADMIN — ATORVASTATIN CALCIUM 40 MILLIGRAM(S): 80 TABLET, FILM COATED ORAL at 21:39

## 2024-04-30 RX ADMIN — Medication 3 MILLIGRAM(S): at 21:39

## 2024-04-30 RX ADMIN — OXYCODONE HYDROCHLORIDE 5 MILLIGRAM(S): 5 TABLET ORAL at 10:37

## 2024-04-30 NOTE — PROGRESS NOTE ADULT - PROBLEM SELECTOR PLAN 1
Due to MRSA bacteremia  -unclear source of MRSA infection  -CT chest without obvious PNA, did mention a masslike consolidation in LLL most suggestive of atelectasis  -urine culture with no growth  -ID following, appreciate recs  -c/w vancomycin 1000mg after HD on HD days M/W/F  -TTE without obvious vegetations noted, awaiting ISRAEL  -Trend blood cultures q48h until clear, may need tagged WBC scan if Bcx remain positive

## 2024-04-30 NOTE — PROGRESS NOTE ADULT - ASSESSMENT
This is a 69 M w/ PMhx of ESRD via L AVF, CVA, R BKA, L AKA, bedbound, CAD, HTN, who is presenting to [facility] on 4/25/24 with vomiting.   In the ED, pt was febrile to 101.1, hypotensive to 66/42, hypoxic requiring 5L NC. Labs w/ mild leukocytosis to 11. Lactate 2.2, BCx w/ 4/4 MRSA.     #MRSA bacteremia, high grade  #Septic shock  #Lactic acidosis  #ESRD on HD via L AVF     Overall, 69 M w/ PMhx of ESRD via L AVF, CVA, R BKA, L AKA, bedbound, CAD, HTN admitted with high grade MRSA bacteremia, with no clear source.   CT A/P with stercoral colitis, lower chest with b/l PLEFF, L > R, possible consolidation?  Awaiting CT Chest, TTE/ISRAEL. Source may be PNA? unclear at this time, if bacteremia is persistent without clear source may need NM scan.   BCx from 4/27 1/2 positive     Plan:   1. C/w Vancomycin 750 mg after HD, obtain trough prior to HD   2. Repeat BCx every 48 hours until negative, f/u set from 4/29  3. F/u ISRAEL   4. [facility] duplex of AVF wnl   5. Will likely need NM scan given persistent BCx+     Thank you for this consult. Inpatient ID team will follow.    Hector Umanzor M.D.  Attending Physician  Division of Infectious Diseases  Department of Medicine    Please contact through MS Teams message.  Office: [phone] (after 5 PM or weekend)

## 2024-04-30 NOTE — PROGRESS NOTE ADULT - SUBJECTIVE AND OBJECTIVE BOX
Infectious Diseases Follow Up:    Patient is a 69y old  Male who presents with a chief complaint of nausea and vomiting (29 Apr 2024 16:29)      Interval History/ROS:  No acute events noted, pt more awake today, AAO x 1, states he has a headache, no other acute complaints.     Allergies  No Known Allergies        ANTIMICROBIALS:      Current Abx:     Previous Abx     OTHER MEDS:  MEDICATIONS  (STANDING):  acetaminophen     Tablet .. 975 once  acetaminophen     Tablet .. 650 every 6 hours PRN  aluminum hydroxide/magnesium hydroxide/simethicone Suspension 30 every 4 hours PRN  aspirin enteric coated 81 daily  atorvastatin 40 at bedtime  furosemide    Tablet 40 two times a day  heparin   Injectable 5000 every 12 hours  lactulose Syrup 10 daily  melatonin 3 at bedtime PRN  midodrine. 5 <User Schedule>  ondansetron Injectable 4 every 8 hours PRN  oxyCODONE    IR 5 every 6 hours PRN  pantoprazole    Tablet 40 before breakfast  polyethylene glycol 3350 17 daily  senna 2 at bedtime      Vital Signs Last 24 Hrs  T(C): 36.8 (30 Apr 2024 05:05), Max: 36.9 (29 Apr 2024 21:25)  T(F): 98.2 (30 Apr 2024 05:05), Max: 98.5 (29 Apr 2024 21:25)  HR: 75 (30 Apr 2024 05:05) (72 - 79)  BP: 140/61 (30 Apr 2024 05:05) (129/60 - 161/59)  BP(mean): --  RR: 18 (30 Apr 2024 05:05) (16 - 20)  SpO2: 97% (30 Apr 2024 05:05) (97% - 98%)    Parameters below as of 30 Apr 2024 05:05  Patient On (Oxygen Delivery Method): room air      PHYSICAL EXAM:  GENERAL: NAD, well-developed  HEAD:  Atraumatic, Normocephalic  EYES: EOMI, conjunctiva and sclera clear=  CHEST/LUNG: Clear to auscultation bilaterally; No wheeze  HEART: Regular rate and rhythm; No murmurs, rubs, or gallops  ABDOMEN: Soft, Nontender, Nondistended; Bowel sounds present  EXTREMITIES:  R BKA, L AKA, stump without any acute findings, no skin breakdown. KASIAE AVF   PSYCH: AAOx1                        12.3   3.97  )-----------( 193      ( 30 Apr 2024 06:16 )             38.6       04-30    134<L>  |  96<L>  |  23  ----------------------------<  50<LL>  4.6   |  20<L>  |  3.93<H>    Ca    8.3<L>      30 Apr 2024 06:16  Phos  3.6     04-30  Mg     3.40     04-30        Urinalysis Basic - ( 30 Apr 2024 06:16 )    Color: x / Appearance: x / SG: x / pH: x  Gluc: 50 mg/dL / Ketone: x  / Bili: x / Urobili: x   Blood: x / Protein: x / Nitrite: x   Leuk Esterase: x / RBC: x / WBC x   Sq Epi: x / Non Sq Epi: x / Bacteria: x        MICROBIOLOGY:  Vancomycin Level, Trough: 27.9 ug/mL (04-30-24 @ 06:16)  Vancomycin Level, Random: 22.8 ug/mL (04-29-24 @ 09:40)  v  .Blood Blood-Peripheral  04-27-24   Growth in anaerobic bottle: Methicillin Resistant Staphylococcus aureus  See previous culture [id number]  --    Growth in anaerobic bottle: Gram Positive Cocci in Clusters      .Blood Blood-Venous  04-27-24   No growth at 48 Hours  --  --      .Blood Blood  04-26-24   No growth at 72 Hours  --  --      .Blood Blood  04-26-24   Growth in aerobic bottle: Methicillin Resistant Staphylococcus aureus  See previous culture [id number]  --    Growth in aerobic bottle: Gram Positive Cocci in Clusters      Clean Catch Clean Catch (Midstream)  04-24-24   No growth  --  --      .Blood Blood-Peripheral  04-24-24   Growth in aerobic and anaerobic bottles: Methicillin Resistant  Staphylococcus aureus  See previous culture [id number]  --    Growth in aerobic bottle: Gram Positive Cocci in Clusters  Growth in anaerobic bottle: Gram Positive Cocci in Clusters      .Blood Blood-Peripheral  04-24-24   Growth in aerobic and anaerobic bottles: Methicillin Resistant  Staphylococcus aureus  Direct identification is available within approximately 3-5  hours either by Blood Panel Multiplexed PCR or Direct  MALDI-TOF. Details: [email or web address]  --  Blood Culture PCR  Methicillin resistant Staphylococcus aureus            RADIOLOGY:

## 2024-04-30 NOTE — PROGRESS NOTE ADULT - SUBJECTIVE AND OBJECTIVE BOX
[facility]   MD MARIBELL CARRION MD MARIA SANTIAGO, NP    TEL:  OFFICE: [phone]  From 5pm-7am Answering Service [phone]    -- RENAL FOLLOW UP NOTE ---Date of Service 04-30-24 @ 15:05    Patient is a 69y old  Male who presents with a chief complaint of nausea and vomiting (30 Apr 2024 11:06)      Patient seen and examined at bedside. No chest pain/sob    VITALS:  T(F): 97.8 (04-30-24 @ 10:05), Max: 98.5 (04-29-24 @ 21:25)  HR: 76 (04-30-24 @ 13:53)  BP: 145/74 (04-30-24 @ 13:53)  RR: 18 (04-30-24 @ 10:05)  SpO2: 97% (04-30-24 @ 10:05)  Wt(kg): --    04-29 @ 07:01  -  04-30 @ 07:00  --------------------------------------------------------  IN: 620 mL / OUT: 2400 mL / NET: -1780 mL          PHYSICAL EXAM:  General: NAD  Neck: No JVD  Respiratory: CTAB, no wheezes, rales or rhonchi  Cardiovascular: S1, S2, RRR  Gastrointestinal: BS+, soft, NT/ND  Extremities: L AKA, R BKA    [facility] Medications:   MEDICATIONS  (STANDING):  acetaminophen     Tablet .. 975 milliGRAM(s) Oral once  ascorbic acid 500 milliGRAM(s) Oral daily  aspirin enteric coated 81 milliGRAM(s) Oral daily  atorvastatin 40 milliGRAM(s) Oral at bedtime  chlorhexidine 2% Cloths 1 Application(s) Topical daily  furosemide    Tablet 40 milliGRAM(s) Oral two times a day  heparin   Injectable 5000 Unit(s) SubCutaneous every 12 hours  lactobacillus acidophilus 1 Tablet(s) Oral daily  lactulose Syrup 10 Gram(s) Oral daily  midodrine. 5 milliGRAM(s) Oral <User Schedule>  mupirocin 2% Ointment 1 Application(s) Both Nostrils two times a day  Nephro-hannah 1 Tablet(s) Oral daily  pantoprazole    Tablet 40 milliGRAM(s) Oral before breakfast  polyethylene glycol 3350 17 Gram(s) Oral daily  senna 2 Tablet(s) Oral at bedtime      LABS:  04-30    134<L>  |  96<L>  |  23  ----------------------------<  50<LL>  4.6   |  20<L>  |  3.93<H>    Ca    8.3<L>      30 Apr 2024 06:16  Phos  3.6     04-30  Mg     3.40     04-30      Creatinine Trend: 3.93 <--, 5.51 <--, 4.18 <--, 3.39 <--, 2.78 <--, 4.83 <--, 3.31 <--, 3.07 <--    Phosphorus: 3.6 mg/dL (04-30 @ 06:16)                              12.3   3.97  )-----------( 193      ( 30 Apr 2024 06:16 )             38.6     Urine Studies:  Urinalysis - [04-30-24 @ 06:16]      Color  / Appearance  / SG  / pH       Gluc 50 / Ketone   / Bili  / Urobili        Blood  / Protein  / Leuk Est  / Nitrite       RBC  / WBC  / Hyaline  / Gran  / Sq Epi  / Non Sq Epi  / Bacteria       PTH -- (Ca --)      [04-28-24 @ 05:00]   155  HbA1c 4.2      [07-19-19 @ 09:56]  TSH 1.790      [06-23-23 @ 11:51]  Lipid: chol 119, TG 66, HDL 46, LDL --      [10-07-23 @ 09:30]        RADIOLOGY & ADDITIONAL STUDIES:

## 2024-04-30 NOTE — PROGRESS NOTE ADULT - ASSESSMENT
69-year-old male with PMH end-stage renal on HD via left aVF, CVA, BKA/AKA, functionally quadriplegic, CAD, HTN, HLD, history of O2 as needed presents for evaluation of vomiting.  Brought from HD, started vomiting while receiving treatment.  Patient unable to endorse any specific complaints, per paperwork AO x 1/0 at baseline.  Patient denies chest pain or difficulty breathing, unable to really clarify about abdominal pain. Nephrology consulted for dialysis needs.    A/P   ESRD  Center: [place]  Nephrologist: Dr. Navarro   Access: L AVF  MWF schedule   s/p HD 4/29 UF 2L   Consent obtained from niece, and proxy, Ramonita Vincent via phone [phone]  Renal diet  Monitor BMP     HTN   BP fluctuating  UF with HD   Monitor BP     Anemia  Above goal   Monitor Hb     CKD-MBD   - acceptable   PO4 acceptable  Monitor Ca, PO4 daily     Nausea/Vomiting  CT A/P shows constipation and wall thickening, worrisome for stercoral colitis  s/p disempaction  Colorectal surgery f/u

## 2024-04-30 NOTE — PROGRESS NOTE ADULT - PROBLEM SELECTOR PLAN 6
Need to verify Percocet - noted on HD paperwork but [place]    Added oxy 5 mg q6h prn for mod to severe pain  DVT ppx: heparin bid   Diet: Passed dysphagia screen dysphagia screen,. Renal DASH TLC easy to chew  Plan of care discussed with hayden Vincent [phone] on 4/29. All questions answered.

## 2024-04-30 NOTE — PROGRESS NOTE ADULT - SUBJECTIVE AND OBJECTIVE BOX
Patient is a 69y old  Male who presents with a chief complaint of nausea and vomiting (30 Apr 2024 08:59)      SUBJECTIVE / OVERNIGHT EVENTS:    No events overnight. This AM, CATRACHO PATRICK feels well. Has no n/v/d/cp/sob.      MEDICATIONS  (STANDING):  acetaminophen     Tablet .. 975 milliGRAM(s) Oral once  ascorbic acid 500 milliGRAM(s) Oral daily  aspirin enteric coated 81 milliGRAM(s) Oral daily  atorvastatin 40 milliGRAM(s) Oral at bedtime  chlorhexidine 2% Cloths 1 Application(s) Topical daily  furosemide    Tablet 40 milliGRAM(s) Oral two times a day  heparin   Injectable 5000 Unit(s) SubCutaneous every 12 hours  lactobacillus acidophilus 1 Tablet(s) Oral daily  lactulose Syrup 10 Gram(s) Oral daily  midodrine. 5 milliGRAM(s) Oral <User Schedule>  mupirocin 2% Ointment 1 Application(s) Both Nostrils two times a day  Nephro-hannah 1 Tablet(s) Oral daily  pantoprazole    Tablet 40 milliGRAM(s) Oral before breakfast  polyethylene glycol 3350 17 Gram(s) Oral daily  senna 2 Tablet(s) Oral at bedtime    MEDICATIONS  (PRN):  acetaminophen     Tablet .. 650 milliGRAM(s) Oral every 6 hours PRN Temp greater or equal to 38C (100.4F), Mild Pain (1 - 3)  aluminum hydroxide/magnesium hydroxide/simethicone Suspension 30 milliLiter(s) Oral every 4 hours PRN Dyspepsia  melatonin 3 milliGRAM(s) Oral at bedtime PRN Insomnia  ondansetron Injectable 4 milliGRAM(s) IV Push every 8 hours PRN Nausea and/or Vomiting  oxyCODONE    IR 5 milliGRAM(s) Oral every 6 hours PRN moderate to severe pain  sodium chloride 0.9% Bolus. 100 milliLiter(s) IV Bolus every 5 minutes PRN SBP LESS THAN or EQUAL to 80 mmHg      PHYSICAL EXAM:  T(C): 36.6 (04-30-24 @ 10:05), Max: 36.9 (04-29-24 @ 21:25)  HR: 73 (04-30-24 @ 10:05) (73 - 79)  BP: 149/66 (04-30-24 @ 10:05) (129/60 - 149/66)  RR: 18 (04-30-24 @ 10:05) (16 - 18)  SpO2: 97% (04-30-24 @ 10:05) (97% - 98%)  I&O's Summary    29 Apr 2024 07:01  -  30 Apr 2024 07:00  --------------------------------------------------------  IN: 620 mL / OUT: 2400 mL / NET: -1780 mL      GENERAL: NAD, lying in bed  HEAD:  Atraumatic, Normocephalic, MMM  CHEST/LUNG: No use of accessory muscles, CTAB, breathing non-labored  COR: RR, no mrcg  ABD: Soft, ND/NT, +BS  PSYCH: AAOxself  NEUROLOGY: CN II-XII grossly intact, moving all extremities  SKIN: No rashes or lesions  EXT: wwp, no cce; LUE AVF    LABS:  CAPILLARY BLOOD GLUCOSE      POCT Blood Glucose.: 76 mg/dL (30 Apr 2024 08:36)  POCT Blood Glucose.: 76 mg/dL (30 Apr 2024 07:46)  POCT Blood Glucose.: 104 mg/dL (29 Apr 2024 21:06)  POCT Blood Glucose.: 91 mg/dL (29 Apr 2024 16:35)  POCT Blood Glucose.: 109 mg/dL (29 Apr 2024 13:45)  POCT Blood Glucose.: 94 mg/dL (29 Apr 2024 11:12)                          12.3   3.97  )-----------( 193      ( 30 Apr 2024 06:16 )             38.6     04-30    134<L>  |  96<L>  |  23  ----------------------------<  50<LL>  4.6   |  20<L>  |  3.93<H>    Ca    8.3<L>      30 Apr 2024 06:16  Phos  3.6     04-30  Mg     3.40     04-30            Urinalysis Basic - ( 30 Apr 2024 06:16 )    Color: x / Appearance: x / SG: x / pH: x  Gluc: 50 mg/dL / Ketone: x  / Bili: x / Urobili: x   Blood: x / Protein: x / Nitrite: x   Leuk Esterase: x / RBC: x / WBC x   Sq Epi: x / Non Sq Epi: x / Bacteria: x          RADIOLOGY & ADDITIONAL TESTS:    Telemetry Personally Reviewed -     Imaging Personally Reviewed -     Imaging Reviewed -     Consultant(s) Notes Reviewed -       Care Discussed with Consultants/Other Providers -

## 2024-04-30 NOTE — PROGRESS NOTE ADULT - ASSESSMENT
69 year old M with PMH ESRD on HD via left AVF MWF, CVA, BKA/AKA, functionally quadriplegic, CAD, HTN, HLD, history of O2 as needed presents for evaluation of vomiting.  Pt admitted for sepsis 2/2 colitis. Evelio c/b MRSA bacteremia.

## 2024-05-01 LAB
ANION GAP SERPL CALC-SCNC: 12 MMOL/L — SIGNIFICANT CHANGE UP (ref 7–14)
BASOPHILS # BLD AUTO: 0.06 K/UL — SIGNIFICANT CHANGE UP (ref 0–0.2)
BASOPHILS NFR BLD AUTO: 1.4 % — SIGNIFICANT CHANGE UP (ref 0–2)
BUN SERPL-MCNC: 28 MG/DL — HIGH (ref 7–23)
CALCIUM SERPL-MCNC: 8.6 MG/DL — SIGNIFICANT CHANGE UP (ref 8.4–10.5)
CHLORIDE SERPL-SCNC: 92 MMOL/L — LOW (ref 98–107)
CO2 SERPL-SCNC: 30 MMOL/L — SIGNIFICANT CHANGE UP (ref 22–31)
CREAT SERPL-MCNC: 5.05 MG/DL — HIGH (ref 0.5–1.3)
CULTURE RESULTS: SIGNIFICANT CHANGE UP
EGFR: 12 ML/MIN/1.73M2 — LOW
EOSINOPHIL # BLD AUTO: 0.47 K/UL — SIGNIFICANT CHANGE UP (ref 0–0.5)
EOSINOPHIL NFR BLD AUTO: 10.8 % — HIGH (ref 0–6)
GLUCOSE BLDC GLUCOMTR-MCNC: 103 MG/DL — HIGH (ref 70–99)
GLUCOSE BLDC GLUCOMTR-MCNC: 107 MG/DL — HIGH (ref 70–99)
GLUCOSE BLDC GLUCOMTR-MCNC: 117 MG/DL — HIGH (ref 70–99)
GLUCOSE BLDC GLUCOMTR-MCNC: 122 MG/DL — HIGH (ref 70–99)
GLUCOSE BLDC GLUCOMTR-MCNC: 90 MG/DL — SIGNIFICANT CHANGE UP (ref 70–99)
GLUCOSE SERPL-MCNC: 74 MG/DL — SIGNIFICANT CHANGE UP (ref 70–99)
HCT VFR BLD CALC: 38.1 % — LOW (ref 39–50)
HGB BLD-MCNC: 12.1 G/DL — LOW (ref 13–17)
IANC: 2.31 K/UL — SIGNIFICANT CHANGE UP (ref 1.8–7.4)
IMM GRANULOCYTES NFR BLD AUTO: 0.5 % — SIGNIFICANT CHANGE UP (ref 0–0.9)
LYMPHOCYTES # BLD AUTO: 0.88 K/UL — LOW (ref 1–3.3)
LYMPHOCYTES # BLD AUTO: 20.2 % — SIGNIFICANT CHANGE UP (ref 13–44)
MAGNESIUM SERPL-MCNC: 3.6 MG/DL — HIGH (ref 1.6–2.6)
MCHC RBC-ENTMCNC: 27.3 PG — SIGNIFICANT CHANGE UP (ref 27–34)
MCHC RBC-ENTMCNC: 31.8 GM/DL — LOW (ref 32–36)
MCV RBC AUTO: 86 FL — SIGNIFICANT CHANGE UP (ref 80–100)
MONOCYTES # BLD AUTO: 0.62 K/UL — SIGNIFICANT CHANGE UP (ref 0–0.9)
MONOCYTES NFR BLD AUTO: 14.2 % — HIGH (ref 2–14)
NEUTROPHILS # BLD AUTO: 2.31 K/UL — SIGNIFICANT CHANGE UP (ref 1.8–7.4)
NEUTROPHILS NFR BLD AUTO: 52.9 % — SIGNIFICANT CHANGE UP (ref 43–77)
NRBC # BLD: 0 /100 WBCS — SIGNIFICANT CHANGE UP (ref 0–0)
NRBC # FLD: 0 K/UL — SIGNIFICANT CHANGE UP (ref 0–0)
PHOSPHATE SERPL-MCNC: 4.3 MG/DL — SIGNIFICANT CHANGE UP (ref 2.5–4.5)
PLATELET # BLD AUTO: 252 K/UL — SIGNIFICANT CHANGE UP (ref 150–400)
POTASSIUM SERPL-MCNC: 4.6 MMOL/L — SIGNIFICANT CHANGE UP (ref 3.5–5.3)
POTASSIUM SERPL-SCNC: 4.6 MMOL/L — SIGNIFICANT CHANGE UP (ref 3.5–5.3)
RBC # BLD: 4.43 M/UL — SIGNIFICANT CHANGE UP (ref 4.2–5.8)
RBC # FLD: 19.6 % — HIGH (ref 10.3–14.5)
SODIUM SERPL-SCNC: 134 MMOL/L — LOW (ref 135–145)
SPECIMEN SOURCE: SIGNIFICANT CHANGE UP
VANCOMYCIN FLD-MCNC: 22.6 UG/ML — SIGNIFICANT CHANGE UP
VANCOMYCIN FLD-MCNC: 27 UG/ML
WBC # BLD: 4.36 K/UL — SIGNIFICANT CHANGE UP (ref 3.8–10.5)
WBC # FLD AUTO: 4.36 K/UL — SIGNIFICANT CHANGE UP (ref 3.8–10.5)

## 2024-05-01 PROCEDURE — 99232 SBSQ HOSP IP/OBS MODERATE 35: CPT

## 2024-05-01 RX ORDER — LOSARTAN POTASSIUM 100 MG/1
25 TABLET, FILM COATED ORAL DAILY
Refills: 0 | Status: DISCONTINUED | OUTPATIENT
Start: 2024-05-01 | End: 2024-05-01

## 2024-05-01 RX ORDER — IPRATROPIUM/ALBUTEROL SULFATE 18-103MCG
3 AEROSOL WITH ADAPTER (GRAM) INHALATION EVERY 6 HOURS
Refills: 0 | Status: DISCONTINUED | OUTPATIENT
Start: 2024-05-01 | End: 2024-05-16

## 2024-05-01 RX ORDER — LOSARTAN POTASSIUM 100 MG/1
25 TABLET, FILM COATED ORAL DAILY
Refills: 0 | Status: DISCONTINUED | OUTPATIENT
Start: 2024-05-01 | End: 2024-05-02

## 2024-05-01 RX ORDER — SACUBITRIL AND VALSARTAN 24; 26 MG/1; MG/1
1 TABLET, FILM COATED ORAL
Qty: 60 | Refills: 0
Start: 2024-05-01 | End: 2024-05-30

## 2024-05-01 RX ORDER — SODIUM CHLORIDE 9 MG/ML
4 INJECTION INTRAMUSCULAR; INTRAVENOUS; SUBCUTANEOUS EVERY 6 HOURS
Refills: 0 | Status: DISCONTINUED | OUTPATIENT
Start: 2024-05-01 | End: 2024-05-16

## 2024-05-01 RX ORDER — CARVEDILOL PHOSPHATE 80 MG/1
6.25 CAPSULE, EXTENDED RELEASE ORAL EVERY 12 HOURS
Refills: 0 | Status: DISCONTINUED | OUTPATIENT
Start: 2024-05-01 | End: 2024-05-16

## 2024-05-01 RX ADMIN — SODIUM CHLORIDE 4 MILLILITER(S): 9 INJECTION INTRAMUSCULAR; INTRAVENOUS; SUBCUTANEOUS at 22:57

## 2024-05-01 RX ADMIN — HEPARIN SODIUM 5000 UNIT(S): 5000 INJECTION INTRAVENOUS; SUBCUTANEOUS at 05:12

## 2024-05-01 RX ADMIN — MUPIROCIN 1 APPLICATION(S): 20 OINTMENT TOPICAL at 05:12

## 2024-05-01 RX ADMIN — HEPARIN SODIUM 5000 UNIT(S): 5000 INJECTION INTRAVENOUS; SUBCUTANEOUS at 18:58

## 2024-05-01 RX ADMIN — Medication 40 MILLIGRAM(S): at 05:12

## 2024-05-01 RX ADMIN — PANTOPRAZOLE SODIUM 40 MILLIGRAM(S): 20 TABLET, DELAYED RELEASE ORAL at 05:12

## 2024-05-01 RX ADMIN — MUPIROCIN 1 APPLICATION(S): 20 OINTMENT TOPICAL at 18:58

## 2024-05-01 RX ADMIN — Medication 1 TABLET(S): at 11:30

## 2024-05-01 RX ADMIN — SODIUM CHLORIDE 4 MILLILITER(S): 9 INJECTION INTRAMUSCULAR; INTRAVENOUS; SUBCUTANEOUS at 15:01

## 2024-05-01 RX ADMIN — SENNA PLUS 2 TABLET(S): 8.6 TABLET ORAL at 21:29

## 2024-05-01 RX ADMIN — Medication 3 MILLILITER(S): at 22:56

## 2024-05-01 RX ADMIN — CARVEDILOL PHOSPHATE 6.25 MILLIGRAM(S): 80 CAPSULE, EXTENDED RELEASE ORAL at 18:57

## 2024-05-01 RX ADMIN — POLYETHYLENE GLYCOL 3350 17 GRAM(S): 17 POWDER, FOR SOLUTION ORAL at 11:31

## 2024-05-01 RX ADMIN — Medication 40 MILLIGRAM(S): at 15:14

## 2024-05-01 RX ADMIN — Medication 500 MILLIGRAM(S): at 11:30

## 2024-05-01 RX ADMIN — LACTULOSE 10 GRAM(S): 10 SOLUTION ORAL at 11:31

## 2024-05-01 RX ADMIN — Medication 3 MILLILITER(S): at 15:00

## 2024-05-01 RX ADMIN — ATORVASTATIN CALCIUM 40 MILLIGRAM(S): 80 TABLET, FILM COATED ORAL at 21:29

## 2024-05-01 RX ADMIN — CHLORHEXIDINE GLUCONATE 1 APPLICATION(S): 213 SOLUTION TOPICAL at 12:40

## 2024-05-01 RX ADMIN — Medication 81 MILLIGRAM(S): at 11:30

## 2024-05-01 NOTE — PROGRESS NOTE ADULT - PROBLEM SELECTOR PLAN 1
Due to MRSA bacteremia  -unclear source of MRSA infection  -CT chest without obvious PNA, did mention a masslike consolidation in LLL most suggestive of atelectasis  -urine culture with no growth  -ID following, appreciate recs  -c/w vancomycin 1000mg after HD on HD days M/W/F  -[facility] w/o vegetation, but noted reduced global hypokinesis    -Trend blood cultures q48h until clear, may need tagged WBC scan if Bcx remain positive

## 2024-05-01 NOTE — PROGRESS NOTE ADULT - SUBJECTIVE AND OBJECTIVE BOX
Infectious Diseases Follow Up:    Patient is a 69y old  Male who presents with a chief complaint of nausea and vomiting (30 Apr 2024 15:04)      Interval History/ROS:  Seen at dialysis, no acute events ON  Pt w/o acute complaints     Allergies  No Known Allergies        ANTIMICROBIALS:      Current Abx:     Previous Abx     OTHER MEDS:  MEDICATIONS  (STANDING):  acetaminophen     Tablet .. 975 once  acetaminophen     Tablet .. 650 every 6 hours PRN  aluminum hydroxide/magnesium hydroxide/simethicone Suspension 30 every 4 hours PRN  aspirin enteric coated 81 daily  atorvastatin 40 at bedtime  furosemide    Tablet 40 two times a day  heparin   Injectable 5000 every 12 hours  lactulose Syrup 10 daily  melatonin 3 at bedtime PRN  midodrine. 5 <User Schedule>  ondansetron Injectable 4 every 8 hours PRN  oxyCODONE    IR 5 every 6 hours PRN  pantoprazole    Tablet 40 before breakfast  polyethylene glycol 3350 17 daily  senna 2 at bedtime      Vital Signs Last 24 Hrs  T(C): 36.1 (01 May 2024 07:30), Max: 37.1 (30 Apr 2024 21:35)  T(F): 97 (01 May 2024 07:30), Max: 98.8 (30 Apr 2024 21:35)  HR: 81 (01 May 2024 07:30) (70 - 81)  BP: 174/79 (01 May 2024 07:30) (142/72 - 174/79)  BP(mean): --  RR: 18 (01 May 2024 07:30) (18 - 18)  SpO2: 99% (01 May 2024 05:10) (97% - 100%)    Parameters below as of 01 May 2024 07:30  Patient On (Oxygen Delivery Method): room air      PHYSICAL EXAM:  GENERAL: NAD, well-developed  HEAD:  Atraumatic, Normocephalic  EYES: EOMI, conjunctiva and sclera clear=  CHEST/LUNG: Clear to auscultation bilaterally; No wheeze  HEART: Regular rate and rhythm; No murmurs, rubs, or gallops  ABDOMEN: Soft, Nontender, Nondistended; Bowel sounds present  EXTREMITIES:  R BKA, L AKA, stump without any acute findings, no skin breakdown. KASIAE AVF   PSYCH: AAOx1                                   12.1   4.36  )-----------( 252      ( 01 May 2024 05:04 )             38.1       05-01    134<L>  |  92<L>  |  28<H>  ----------------------------<  74  4.6   |  30  |  5.05<H>    Ca    8.6      01 May 2024 05:04  Phos  4.3     05-01  Mg     3.60     05-01        Urinalysis Basic - ( 01 May 2024 05:04 )    Color: x / Appearance: x / SG: x / pH: x  Gluc: 74 mg/dL / Ketone: x  / Bili: x / Urobili: x   Blood: x / Protein: x / Nitrite: x   Leuk Esterase: x / RBC: x / WBC x   Sq Epi: x / Non Sq Epi: x / Bacteria: x        MICROBIOLOGY:  Vancomycin Level, Random: 27.0 ug/mL (05-01-24 @ 05:04)  v  .Blood Blood-Venous  04-29-24   No growth at 24 hours  --  --      .Blood Blood-Peripheral  04-29-24   No growth at 24 hours  --  --      .Blood Blood-Peripheral  04-27-24   Growth in anaerobic bottle: Methicillin Resistant Staphylococcus aureus  See previous culture [id number]  --    Growth in anaerobic bottle: Gram Positive Cocci in Clusters      .Blood Blood-Venous  04-27-24   No growth at 72 Hours  --  --      .Blood Blood  04-26-24   No growth at 4 days  --  --      .Blood Blood  04-26-24   Growth in aerobic bottle: Methicillin Resistant Staphylococcus aureus  See previous culture [id number]  --    Growth in aerobic bottle: Gram Positive Cocci in Clusters      Clean Catch Clean Catch (Midstream)  04-24-24   No growth  --  --      .Blood Blood-Peripheral  04-24-24   Growth in aerobic and anaerobic bottles: Methicillin Resistant  Staphylococcus aureus  See previous culture [id number]  --    Growth in aerobic bottle: Gram Positive Cocci in Clusters  Growth in anaerobic bottle: Gram Positive Cocci in Clusters      .Blood Blood-Peripheral  04-24-24   Growth in aerobic and anaerobic bottles: Methicillin Resistant  Staphylococcus aureus  Direct identification is available within approximately 3-5  hours either by Blood Panel Multiplexed PCR or Direct  MALDI-TOF. Details: [email or web address]  --  Blood Culture PCR  Methicillin resistant Staphylococcus aureus            RADIOLOGY:  < from: [facility] W or WO Ultrasound Enhancing Agent (04.30.24 @ 14:45) >  CONCLUSIONS:      1. Left ventricular systolic function is severely decreased. Global left ventricular hypokinesis.   2. Normal right ventricular cavity size and normal systolic function.   3. Structurally normal mitral valve with normal leaflet excursion. No vegetations seen in association with the mitral valve. There is calcification of the mitral valve annulus. There is mild to moderate mitral regurgitation.   4. The aortic valve appears trileaflet with reduced systolic excursion. There is calcification of the aortic valve leaflets. No vegetations seen in association with the aortic valve. There is mild aortic stenosis. The aortic valve area is estimated at 1.60 cm² by 2D planimetry. There is mild aortic regurgitation.   5. No atheroma in the visualized portions of the proximal ascending aorta. Mild non-mobile atheroma in the visualized portions of the transverse aortic arch. Mild non-mobile atheroma in the visualized portions of the descending aorta.   6. The left atrium is normal in size. There is no evidence of left atrial or left atrial appendage thrombus.   7. Agitated saline injection was negative for intracardiac shunt.   8. No echocardiographic evidence of vegetations.

## 2024-05-01 NOTE — CONSULT NOTE ADULT - SUBJECTIVE AND OBJECTIVE BOX
Patient seen and evaluated at bedside    Chief Complaint:    HPI:  69 year old M with PMH ESRD on HD via left AVF MWF, CVA, BKA/AKA, functionally quadriplegic, CAD, HTN, HLD, history of O2 as needed presents for evaluation of vomiting.  CT A/P with stercoral colitis, lower chest with b/l PLEFF, L > R, possible consolidation? Pt admitted for sepsis 2/2 colitis. Course c/b MRSA bacteremia with unclear source at this time.   Cardiology was previously consulted for low EF on  CT initially and now reconsulted for severely reduced EF on [place].      PMHx:   DM (diabetes mellitus)    HTN (hypertension)    Kidney disease    Below knee amputation status, right    CKD (chronic kidney disease)    MRSA (methicillin resistant Staphylococcus aureus) colonization    Wound    No pertinent past medical history    DM (diabetes mellitus)    HTN (hypertension)    Kidney disease        PSHx:   No significant past surgical history    S/P BKA (below knee amputation) unilateral, right    H/O peripheral artery bypass    No significant past surgical history    Amputated left leg    Amputated right leg        Allergies:  No Known Allergies      Home Meds:  DM (diabetes mellitus)    HTN (hypertension)    Kidney disease    Below knee amputation status, right    CKD (chronic kidney disease)    MRSA (methicillin resistant Staphylococcus aureus) colonization    Wound    No pertinent past medical history    DM (diabetes mellitus)    HTN (hypertension)    Kidney disease        Current Medications:   acetaminophen     Tablet .. 650 milliGRAM(s) Oral every 6 hours PRN  albuterol/ipratropium for Nebulization 3 milliLiter(s) Nebulizer every 6 hours  aluminum hydroxide/magnesium hydroxide/simethicone Suspension 30 milliLiter(s) Oral every 4 hours PRN  ascorbic acid 500 milliGRAM(s) Oral daily  aspirin enteric coated 81 milliGRAM(s) Oral daily  atorvastatin 40 milliGRAM(s) Oral at bedtime  carvedilol 6.25 milliGRAM(s) Oral every 12 hours  chlorhexidine 2% Cloths 1 Application(s) Topical daily  furosemide    Tablet 40 milliGRAM(s) Oral two times a day  heparin   Injectable 5000 Unit(s) SubCutaneous every 12 hours  lactobacillus acidophilus 1 Tablet(s) Oral daily  lactulose Syrup 10 Gram(s) Oral daily  melatonin 3 milliGRAM(s) Oral at bedtime PRN  midodrine. 5 milliGRAM(s) Oral <User Schedule>  mupirocin 2% Ointment 1 Application(s) Both Nostrils two times a day  Nephro-hannah 1 Tablet(s) Oral daily  ondansetron Injectable 4 milliGRAM(s) IV Push every 8 hours PRN  oxyCODONE    IR 5 milliGRAM(s) Oral every 6 hours PRN  pantoprazole    Tablet 40 milliGRAM(s) Oral before breakfast  polyethylene glycol 3350 17 Gram(s) Oral daily  senna 2 Tablet(s) Oral at bedtime  sodium chloride 0.9% Bolus. 100 milliLiter(s) IV Bolus every 5 minutes PRN  sodium chloride 3%  Inhalation 4 milliLiter(s) Inhalation every 6 hours      FAMILY HISTORY:  Family history of diabetes mellitus        Social History:  Smoking History:  Alcohol Use:  Drug Use:    REVIEW OF SYSTEMS:  Constitutional:     [ ] negative [ ] fevers [ ] chills [ ] weight loss [ ] weight gain  HEENT:                  [ ] negative [ ] dry eyes [ ] eye irritation [ ] postnasal drip [ ] nasal congestion  CV:                         [ ] negative  [ ] chest pain [ ] orthopnea [ ] palpitations [ ] murmur  Resp:                     [ ] negative [ ] cough [ ] shortness of breath [ ] dyspnea [ ] wheezing [ ] sputum [ ]hemoptysis  GI:                          [ ] negative [ ] nausea [ ] vomiting [ ] diarrhea [ ] constipation [ ] abd pain [ ] dysphagia   :                        [ ] negative [ ] dysuria [ ] nocturia [ ] hematuria [ ] increased urinary frequency  Musculoskeletal: [ ] negative [ ] back pain [ ] myalgias [ ] arthralgias [ ] fracture  Skin:                       [ ] negative [ ] rash [ ] itch  Neurological:        [ ] negative [ ] headache [ ] dizziness [ ] syncope [ ] weakness [ ] numbness  Psychiatric:           [ ] negative [ ] anxiety [ ] depression  Endocrine:            [ ] negative [ ] diabetes [ ] thyroid problem  Heme/Lymph:      [ ] negative [ ] anemia [ ] bleeding problem  Allergic/Immune: [ ] negative [ ] itchy eyes [ ] nasal discharge [ ] hives [ ] angioedema    [ ] All other systems negative  [ ] Unable to assess ROS due to      Physical Exam:  T(F): 96.8 (05-01), Max: 98.8 (04-30)  HR: 83 (05-01) (70 - 83)  BP: 155/68 (05-01) (142/72 - 174/79)  RR: 18 (05-01)  SpO2: 99% (05-01)  GENERAL: No acute distress, well-developed  HEAD:  Atraumatic, Normocephalic  ENT: EOMI, PERRLA, conjunctiva and sclera clear, Neck supple, No JVD, moist mucosa  CHEST/LUNG: Clear to auscultation bilaterally; No wheeze, equal breath sounds bilaterally   BACK: No spinal tenderness  HEART: Regular rate and rhythm; No murmurs, rubs, or gallops  ABDOMEN: Soft, Nontender, Nondistended; Bowel sounds present  EXTREMITIES:  No clubbing, cyanosis, or edema      Cardiovascular Diagnostic Testing:    ECG: Personally reviewed:    Echo: Personally reviewed:    Summary:   1. Mildly decreased global left ventricular systolic function.   2. Left ventricular ejection fraction, by visual estimation, is 40 to   45%.   3. Spectral Doppler shows restrictive pattern of left ventricular   myocardial filling (Grade III diastolic dysfunction).   4. Mild to moderately enlarged left atrium.   5. Structurally normal mitral valve, with normal leaflet excursion.   6. Mild mitral valve regurgitation.   7. Mild aortic valve stenosis.   8. Mildly reduced RV systolic function.   9. Structurally normal tricuspid valve, with normal leaflet excursion.  10. Moderate tricuspid regurgitation.  11. Structurally normal pulmonic valve, with normal leaflet excursion.  12. Estimated pulmonary artery systolic pressure is 56.1 mmHg assuming a   right atrial pressure of 5 mmHg, which is consistent with moderate   pulmonary hypertension.  13. Large pleural effusion in the left lateral region.      [id number] Jerrod Horta MD, FACC, RVPI  Electronically signed on 6/23/2023 at 1:09:09 PM        < from: IS[place]EL W or WO Ultrasound Enhancing Agent (04.30.24 @ 14:45) >  CONCLUSIONS:      1. Left ventricular systolic function is severely decreased. Global left ventricular hypokinesis.   2. Normal right ventricular cavity size and normal systolic function.   3. Structurally normal mitral valve with normal leaflet excursion. No vegetations seen in association with the mitral valve. There is calcification of the mitral valve annulus. There is mild to moderate mitral regurgitation.   4. The aortic valve appears trileaflet with reduced systolic excursion. There is calcification of the aortic valve leaflets. No vegetations seen in association with the aortic valve. There is mild aortic stenosis. The aortic valve area is estimated at 1.60 cm² by 2D planimetry. There is mild aortic regurgitation.   5. No atheroma in the visualized portions of the proximal ascending aorta. Mild non-mobile atheroma in the visualized portions of the transverse aortic arch. Mild non-mobile atheroma in the visualized portions of the descending aorta.   6. The left atrium is normal in size. There is no evidence of left atrial or left atrial appendage thrombus.   7. Agitated saline injection was negative for intracardiac shunt.   8. No echocardiographic evidence of vegetations.      Imaging:    CXR: Personally reviewed    Labs: Personally reviewed                        12.1   4.36  )-----------( 252      ( 01 May 2024 05:04 )             38.1     05-01    134<L>  |  92<L>  |  28<H>  ----------------------------<  74  4.6   |  30  |  5.05<H>    Ca    8.6      01 May 2024 05:04  Phos  4.3     05-01  Mg     3.60     05-01                  CARDIAC MARKERS ( 25 Apr 2024 02:11 )  355 ng/L / x     / x     / x     / x     / x      CARDIAC MARKERS ( 24 Apr 2024 22:02 )  384 ng/L / x     / x     / 64 U/L / x     / 1.3 ng/mL      No Known Allergies    acetaminophen     Tablet .. 650 milliGRAM(s) Oral every 6 hours PRN  albuterol/ipratropium for Nebulization 3 milliLiter(s) Nebulizer every 6 hours  aluminum hydroxide/magnesium hydroxide/simethicone Suspension 30 milliLiter(s) Oral every 4 hours PRN  ascorbic acid 500 milliGRAM(s) Oral daily  aspirin enteric coated 81 milliGRAM(s) Oral daily  atorvastatin 40 milliGRAM(s) Oral at bedtime  carvedilol 6.25 milliGRAM(s) Oral every 12 hours  chlorhexidine 2% Cloths 1 Application(s) Topical daily  furosemide    Tablet 40 milliGRAM(s) Oral two times a day  heparin   Injectable 5000 Unit(s) SubCutaneous every 12 hours  lactobacillus acidophilus 1 Tablet(s) Oral daily  lactulose Syrup 10 Gram(s) Oral daily  melatonin 3 milliGRAM(s) Oral at bedtime PRN  midodrine. 5 milliGRAM(s) Oral <User Schedule>  mupirocin 2% Ointment 1 Application(s) Both Nostrils two times a day  Nephro-hannah 1 Tablet(s) Oral daily  ondansetron Injectable 4 milliGRAM(s) IV Push every 8 hours PRN  oxyCODONE    IR 5 milliGRAM(s) Oral every 6 hours PRN  pantoprazole    Tablet 40 milliGRAM(s) Oral before breakfast  polyethylene glycol 3350 17 Gram(s) Oral daily  senna 2 Tablet(s) Oral at bedtime  sodium chloride 0.9% Bolus. 100 milliLiter(s) IV Bolus every 5 minutes PRN  sodium chloride 3%  Inhalation 4 milliLiter(s) Inhalation every 6 hours    T(F): 96.8 (05-01), Max: 98.8 (04-30)  HR: 83 (05-01) (70 - 83)  BP: 155/68 (05-01) (142/72 - 174/79)  RR: 18 (05-01)  SpO2: 99% (05-01) Patient seen and evaluated at bedside    Chief Complaint:    HPI:  69 year old M with PMH ESRD on HD via left AVF MWF, CVA, BKA/AKA, functionally quadriplegic, CAD, HTN, HLD, history of O2 as needed presents for evaluation of vomiting.  CT A/P with stercoral colitis, lower chest with b/l PLEFF, L > R, possible consolidation? Pt admitted for sepsis 2/2 colitis. Course c/b MRSA bacteremia with unclear source at this time.   Cardiology was previously consulted for low EF on  CT initially and now reconsulted for severely reduced EF on [place].      PMHx:   DM (diabetes mellitus)    HTN (hypertension)    Kidney disease    Below knee amputation status, right    CKD (chronic kidney disease)    MRSA (methicillin resistant Staphylococcus aureus) colonization    Wound    No pertinent past medical history    DM (diabetes mellitus)    HTN (hypertension)    Kidney disease        PSHx:   No significant past surgical history    S/P BKA (below knee amputation) unilateral, right    H/O peripheral artery bypass    No significant past surgical history    Amputated left leg    Amputated right leg        Allergies:  No Known Allergies      Home Meds:  DM (diabetes mellitus)    HTN (hypertension)    Kidney disease    Below knee amputation status, right    CKD (chronic kidney disease)    MRSA (methicillin resistant Staphylococcus aureus) colonization    Wound    No pertinent past medical history    DM (diabetes mellitus)    HTN (hypertension)    Kidney disease        Current Medications:   acetaminophen     Tablet .. 650 milliGRAM(s) Oral every 6 hours PRN  albuterol/ipratropium for Nebulization 3 milliLiter(s) Nebulizer every 6 hours  aluminum hydroxide/magnesium hydroxide/simethicone Suspension 30 milliLiter(s) Oral every 4 hours PRN  ascorbic acid 500 milliGRAM(s) Oral daily  aspirin enteric coated 81 milliGRAM(s) Oral daily  atorvastatin 40 milliGRAM(s) Oral at bedtime  carvedilol 6.25 milliGRAM(s) Oral every 12 hours  chlorhexidine 2% Cloths 1 Application(s) Topical daily  furosemide    Tablet 40 milliGRAM(s) Oral two times a day  heparin   Injectable 5000 Unit(s) SubCutaneous every 12 hours  lactobacillus acidophilus 1 Tablet(s) Oral daily  lactulose Syrup 10 Gram(s) Oral daily  melatonin 3 milliGRAM(s) Oral at bedtime PRN  midodrine. 5 milliGRAM(s) Oral <User Schedule>  mupirocin 2% Ointment 1 Application(s) Both Nostrils two times a day  Nephro-hannah 1 Tablet(s) Oral daily  ondansetron Injectable 4 milliGRAM(s) IV Push every 8 hours PRN  oxyCODONE    IR 5 milliGRAM(s) Oral every 6 hours PRN  pantoprazole    Tablet 40 milliGRAM(s) Oral before breakfast  polyethylene glycol 3350 17 Gram(s) Oral daily  senna 2 Tablet(s) Oral at bedtime  sodium chloride 0.9% Bolus. 100 milliLiter(s) IV Bolus every 5 minutes PRN  sodium chloride 3%  Inhalation 4 milliLiter(s) Inhalation every 6 hours      FAMILY HISTORY:  Family history of diabetes mellitus        Social History:  /    REVIEW OF SYSTEMS:  Constitutional:     [ ] negative [ ] fevers [ ] chills [ ] weight loss [ ] weight gain  HEENT:                  [ ] negative [ ] dry eyes [ ] eye irritation [ ] postnasal drip [ ] nasal congestion  CV:                         [ ] negative  [ ] chest pain [ ] orthopnea [ ] palpitations [ ] murmur  Resp:                     [ ] negative [ ] cough [ ] shortness of breath [ ] dyspnea [ ] wheezing [ ] sputum [ ]hemoptysis  GI:                          [ ] negative [ ] nausea [ ] vomiting [ ] diarrhea [ ] constipation [ ] abd pain [ ] dysphagia   :                        [ ] negative [ ] dysuria [ ] nocturia [ ] hematuria [ ] increased urinary frequency  Musculoskeletal: [ ] negative [ ] back pain [ ] myalgias [ ] arthralgias [ ] fracture  Skin:                       [ ] negative [ ] rash [ ] itch  Neurological:        [ ] negative [ ] headache [ ] dizziness [ ] syncope [ ] weakness [ ] numbness  Psychiatric:           [ ] negative [ ] anxiety [ ] depression  Endocrine:            [ ] negative [ ] diabetes [ ] thyroid problem  Heme/Lymph:      [ ] negative [ ] anemia [ ] bleeding problem  Allergic/Immune: [ ] negative [ ] itchy eyes [ ] nasal discharge [ ] hives [ ] angioedema    [ ] All other systems negative  [ ] Unable to assess ROS due to      Physical Exam:  T(F): 96.8 (05-01), Max: 98.8 (04-30)  HR: 83 (05-01) (70 - 83)  BP: 155/68 (05-01) (142/72 - 174/79)  RR: 18 (05-01)  SpO2: 99% (05-01)  GENERAL: No acute distress, A&Ox1  HEAD:  Atraumatic, Normocephalic  ENT: EOMI, PERRLA, conjunctiva and sclera clear, Neck supple, No JVD, moist mucosa  CHEST/LUNG: Clear to auscultation bilaterally; No wheeze, equal breath sounds bilaterally   BACK: No spinal tenderness  HEART: Regular rate and rhythm; No murmurs, rubs, or gallops  ABDOMEN: Soft, Nontender, Nondistended; Bowel sounds present  EXTREMITIES:  Evidene of BKA on the right and AKA on the left      Cardiovascular Diagnostic Testing:    ECG: Personally reviewed: Normal sinus, LVH with repol abnormalities    Echo: Personally reviewed:    Summary:   1. Mildly decreased global left ventricular systolic function.   2. Left ventricular ejection fraction, by visual estimation, is 40 to   45%.   3. Spectral Doppler shows restrictive pattern of left ventricular   myocardial filling (Grade III diastolic dysfunction).   4. Mild to moderately enlarged left atrium.   5. Structurally normal mitral valve, with normal leaflet excursion.   6. Mild mitral valve regurgitation.   7. Mild aortic valve stenosis.   8. Mildly reduced RV systolic function.   9. Structurally normal tricuspid valve, with normal leaflet excursion.  10. Moderate tricuspid regurgitation.  11. Structurally normal pulmonic valve, with normal leaflet excursion.  12. Estimated pulmonary artery systolic pressure is 56.1 mmHg assuming a   right atrial pressure of 5 mmHg, which is consistent with moderate   pulmonary hypertension.  13. Large pleural effusion in the left lateral region.      [id number] Jerrod Horta MD, FACC, RVPI  Electronically signed on 6/23/2023 at 1:09:09 PM        < from: [place] W or WO Ultrasound Enhancing Agent (04.30.24 @ 14:45) >  CONCLUSIONS:      1. Left ventricular systolic function is severely decreased. Global left ventricular hypokinesis.   2. Normal right ventricular cavity size and normal systolic function.   3. Structurally normal mitral valve with normal leaflet excursion. No vegetations seen in association with the mitral valve. There is calcification of the mitral valve annulus. There is mild to moderate mitral regurgitation.   4. The aortic valve appears trileaflet with reduced systolic excursion. There is calcification of the aortic valve leaflets. No vegetations seen in association with the aortic valve. There is mild aortic stenosis. The aortic valve area is estimated at 1.60 cm² by 2D planimetry. There is mild aortic regurgitation.   5. No atheroma in the visualized portions of the proximal ascending aorta. Mild non-mobile atheroma in the visualized portions of the transverse aortic arch. Mild non-mobile atheroma in the visualized portions of the descending aorta.   6. The left atrium is normal in size. There is no evidence of left atrial or left atrial appendage thrombus.   7. Agitated saline injection was negative for intracardiac shunt.   8. No echocardiographic evidence of vegetations.      Imaging:    CXR: Personally reviewed    Labs: Personally reviewed                        12.1   4.36  )-----------( 252      ( 01 May 2024 05:04 )             38.1     05-01    134<L>  |  92<L>  |  28<H>  ----------------------------<  74  4.6   |  30  |  5.05<H>    Ca    8.6      01 May 2024 05:04  Phos  4.3     05-01  Mg     3.60     05-01                  CARDIAC MARKERS ( 25 Apr 2024 02:11 )  355 ng/L / x     / x     / x     / x     / x      CARDIAC MARKERS ( 24 Apr 2024 22:02 )  384 ng/L / x     / x     / 64 U/L / x     / 1.3 ng/mL      No Known Allergies    acetaminophen     Tablet .. 650 milliGRAM(s) Oral every 6 hours PRN  albuterol/ipratropium for Nebulization 3 milliLiter(s) Nebulizer every 6 hours  aluminum hydroxide/magnesium hydroxide/simethicone Suspension 30 milliLiter(s) Oral every 4 hours PRN  ascorbic acid 500 milliGRAM(s) Oral daily  aspirin enteric coated 81 milliGRAM(s) Oral daily  atorvastatin 40 milliGRAM(s) Oral at bedtime  carvedilol 6.25 milliGRAM(s) Oral every 12 hours  chlorhexidine 2% Cloths 1 Application(s) Topical daily  furosemide    Tablet 40 milliGRAM(s) Oral two times a day  heparin   Injectable 5000 Unit(s) SubCutaneous every 12 hours  lactobacillus acidophilus 1 Tablet(s) Oral daily  lactulose Syrup 10 Gram(s) Oral daily  melatonin 3 milliGRAM(s) Oral at bedtime PRN  midodrine. 5 milliGRAM(s) Oral <User Schedule>  mupirocin 2% Ointment 1 Application(s) Both Nostrils two times a day  Nephro-hannah 1 Tablet(s) Oral daily  ondansetron Injectable 4 milliGRAM(s) IV Push every 8 hours PRN  oxyCODONE    IR 5 milliGRAM(s) Oral every 6 hours PRN  pantoprazole    Tablet 40 milliGRAM(s) Oral before breakfast  polyethylene glycol 3350 17 Gram(s) Oral daily  senna 2 Tablet(s) Oral at bedtime  sodium chloride 0.9% Bolus. 100 milliLiter(s) IV Bolus every 5 minutes PRN  sodium chloride 3%  Inhalation 4 milliLiter(s) Inhalation every 6 hours    T(F): 96.8 (05-01), Max: 98.8 (04-30)  HR: 83 (05-01) (70 - 83)  BP: 155/68 (05-01) (142/72 - 174/79)  RR: 18 (05-01)  SpO2: 99% (05-01)

## 2024-05-01 NOTE — PROGRESS NOTE ADULT - ASSESSMENT
69-year-old male with PMH end-stage renal on HD via left aVF, CVA, BKA/AKA, functionally quadriplegic, CAD, HTN, HLD, history of O2 as needed presents for evaluation of vomiting.  Brought from HD, started vomiting while receiving treatment.  Patient unable to endorse any specific complaints, per paperwork AO x 1/0 at baseline.  Patient denies chest pain or difficulty breathing, unable to really clarify about abdominal pain. Nephrology consulted for dialysis needs.    A/P   ESRD  Center: [facility]  Nephrologist: Dr. Navarro   Access: L AVF  MWF schedule --HD TODAY  s/p HD 4/29 UF 2L   Consent obtained from niece, and proxy, Ramonita Vincent via phone [phone]  Renal diet  Monitor BMP     HTN   BP fluctuating  UF with HD   Monitor BP     Anemia  Above goal   Monitor Hb     CKD-MBD   - acceptable   Monitor Ca, PO4 daily     Nausea/Vomiting  CT A/P shows constipation and wall thickening, worrisome for stercoral colitis  s/p disempaction  Colorectal surgery f/u

## 2024-05-01 NOTE — PROGRESS NOTE ADULT - ASSESSMENT
This is a 69 M w/ PMhx of ESRD via L AVF, CVA, R BKA, L AKA, bedbound, CAD, HTN, who is presenting to [facility] on 4/25/24 with vomiting.   In the ED, pt was febrile to 101.1, hypotensive to 66/42, hypoxic requiring 5L NC. Labs w/ mild leukocytosis to 11. Lactate 2.2, BCx w/ 4/4 MRSA.     #MRSA bacteremia, high grade  #Septic shock  #Lactic acidosis  #ESRD on HD via L AVF     Overall, 69 M w/ PMhx of ESRD via L AVF, CVA, R BKA, L AKA, bedbound, CAD, HTN admitted with high grade MRSA bacteremia, with no clear source.   CT A/P with stercoral colitis, lower chest with b/l PLEFF, L > R, possible consolidation?  Awaiting CT Chest, TTE/ISRAEL. Source may be PNA? unclear at this time, if bacteremia is persistent without clear source may need NM scan.   BCx from 4/27 1/2 positive, ISRAEL negative, BCx from 4/29 NGTD     Plan:   1. C/w Vancomycin 500 mg after HD, obtain trough prior to HD   2. Repeat BCx every 48 hours until negative, f/u set from 4/29  3. ISRAEL no vegetations, VA duplex of AVF wnl     OPAT recommendations:   IF BCx from 4/29 remain negative, will plan on 4 week course until 4/27    Thank you for this consult. Inpatient ID team will follow.    Hector Umanzor M.D.  Attending Physician  Division of Infectious Diseases  Department of Medicine    Please contact through MS Teams message.  Office: [phone] (after 5 PM or weekend)   This is a 69 M w/ PMhx of ESRD via L AVF, CVA, R BKA, L AKA, bedbound, CAD, HTN, who is presenting to [facility] on 4/25/24 with vomiting.   In the ED, pt was febrile to 101.1, hypotensive to 66/42, hypoxic requiring 5L NC. Labs w/ mild leukocytosis to 11. Lactate 2.2, BCx w/ 4/4 MRSA.     #MRSA bacteremia, high grade  #Septic shock  #Lactic acidosis  #ESRD on HD via L AVF     Overall, 69 M w/ PMhx of ESRD via L AVF, CVA, R BKA, L AKA, bedbound, CAD, HTN admitted with high grade MRSA bacteremia, with no clear source.   CT A/P with stercoral colitis, lower chest with b/l PLEFF, L > R, possible consolidation?  Awaiting CT Chest, TTE/ISRAEL. Source may be PNA? unclear at this time, if bacteremia is persistent without clear source may need NM scan.   BCx from 4/27 1/2 positive, ISRAEL negative, BCx from 4/29 NGTD     Plan:   1. C/w Vancomycin 500 mg after HD, obtain trough prior to HD   2. Repeat BCx every 48 hours until negative, f/u set from 4/29  3. ISRAEL no vegetations, VA duplex of AVF wnl     OPAT recommendations:   IF BCx from 4/29 remain negative, will plan on 4 week course until 5/27  Vancomycin 500 mg post HD  Weekly CBC, BMP, vancomycin trough       Thank you for this consult. Inpatient ID team will follow.    Hector Umanzor M.D.  Attending Physician  Division of Infectious Diseases  Department of Medicine    Please contact through MS Teams message.  Office: [phone] (after 5 PM or weekend)

## 2024-05-01 NOTE — PROGRESS NOTE ADULT - SUBJECTIVE AND OBJECTIVE BOX
[facility]   MD MARIBELL CARRION MD RUORU WONG, PA    TEL:  FROM 9 AM to 5 PM ---OFFICE: [phone]  AVAILABLE ON TEAMS    FROM 5 PM - 9 AM PLEASE CALL ANSWERING SERVICE: [phone]    RENAL FOLLOW UP NOTE--Date of Service 05-01-24 @ 10:28  --------------------------------------------------------------------------------  HPI:      Pt seen and examined at bedside.       PAST HISTORY  --------------------------------------------------------------------------------  No significant changes to PMH, PSH, FHx, SHx, unless otherwise noted    ALLERGIES & MEDICATIONS  --------------------------------------------------------------------------------  Allergies    No Known Allergies    Intolerances      Standing Inpatient Medications  acetaminophen     Tablet .. 975 milliGRAM(s) Oral once  ascorbic acid 500 milliGRAM(s) Oral daily  aspirin enteric coated 81 milliGRAM(s) Oral daily  atorvastatin 40 milliGRAM(s) Oral at bedtime  chlorhexidine 2% Cloths 1 Application(s) Topical daily  furosemide    Tablet 40 milliGRAM(s) Oral two times a day  heparin   Injectable 5000 Unit(s) SubCutaneous every 12 hours  lactobacillus acidophilus 1 Tablet(s) Oral daily  lactulose Syrup 10 Gram(s) Oral daily  midodrine. 5 milliGRAM(s) Oral <User Schedule>  mupirocin 2% Ointment 1 Application(s) Both Nostrils two times a day  Nephro-hannah 1 Tablet(s) Oral daily  pantoprazole    Tablet 40 milliGRAM(s) Oral before breakfast  polyethylene glycol 3350 17 Gram(s) Oral daily  senna 2 Tablet(s) Oral at bedtime    PRN Inpatient Medications  acetaminophen     Tablet .. 650 milliGRAM(s) Oral every 6 hours PRN  aluminum hydroxide/magnesium hydroxide/simethicone Suspension 30 milliLiter(s) Oral every 4 hours PRN  melatonin 3 milliGRAM(s) Oral at bedtime PRN  ondansetron Injectable 4 milliGRAM(s) IV Push every 8 hours PRN  oxyCODONE    IR 5 milliGRAM(s) Oral every 6 hours PRN  sodium chloride 0.9% Bolus. 100 milliLiter(s) IV Bolus every 5 minutes PRN      REVIEW OF SYSTEMS  --------------------------------------------------------------------------------  General: no fever  MSK: no edema     VITALS/PHYSICAL EXAM  --------------------------------------------------------------------------------  T(C): 36.1 (05-01-24 @ 07:30), Max: 37.1 (04-30-24 @ 21:35)  HR: 81 (05-01-24 @ 07:30) (70 - 81)  BP: 174/79 (05-01-24 @ 07:30) (142/72 - 174/79)  RR: 18 (05-01-24 @ 07:30) (18 - 18)  SpO2: 99% (05-01-24 @ 05:10) (98% - 100%)  Wt(kg): --        04-30-24 @ 07:01  -  05-01-24 @ 07:00  --------------------------------------------------------  IN: 220 mL / OUT: 0 mL / NET: 220 mL      Physical Exam:  	Gen: NAD  	HEENT: MMM  	Pulm: CTA B/L  	CV: S1S2  	Abd: Soft, +BS  	Ext: No LE edema B/L                      Neuro: Awake   	Skin: Warm and Dry   	Vascular access: avf           no  cervantes  LABS/STUDIES  --------------------------------------------------------------------------------              12.1   4.36  >-----------<  252      [05-01-24 @ 05:04]              38.1     134  |  92  |  28  ----------------------------<  74      [05-01-24 @ 05:04]  4.6   |  30  |  5.05        Ca     8.6     [05-01-24 @ 05:04]      Mg     3.60     [05-01-24 @ 05:04]      Phos  4.3     [05-01-24 @ 05:04]            Creatinine Trend:  SCr 5.05 [05-01 @ 05:04]  SCr 3.93 [04-30 @ 06:16]  SCr 5.51 [04-29 @ 05:05]  SCr 4.18 [04-28 @ 05:00]  SCr 3.39 [04-27 @ 16:47]    Urinalysis - [05-01-24 @ 05:04]      Color  / Appearance  / SG  / pH       Gluc 74 / Ketone   / Bili  / Urobili        Blood  / Protein  / Leuk Est  / Nitrite       RBC  / WBC  / Hyaline  / Gran  / Sq Epi  / Non Sq Epi  / Bacteria       PTH -- (Ca --)      [04-28-24 @ 05:00]   155  HbA1c 4.2      [07-19-19 @ 09:56]  TSH 1.790      [06-23-23 @ 11:51]  Lipid: chol 119, TG 66, HDL 46, LDL --      [10-07-23 @ 09:30]

## 2024-05-01 NOTE — ADVANCED PRACTICE NURSE CONSULT - RECOMMEDATIONS
Topical recommendations:   Sacrum to bilateral buttocks- Cleanse with soap and water, pat dry. Apply Martine moisture barrier cream twice a day or PRN with episodes of incontinence.     Continue low air loss bed therapy,  heel elevation with offloading boots, turn & reposition q2h with Z-flow fluidized pillow, continue moisture management with barrier creams as specified above & single breathable pad, continue measures to decrease friction/shear.   Plan discussed with ACP provider, Dale Anderson.     Please reconsult if any wound changes or if we can be of further assistance (ext [phone]).

## 2024-05-01 NOTE — ADVANCED PRACTICE NURSE CONSULT - REASON FOR CONSULT
Patient seen on skin care rounds after wound care referral received for assessment of skin impairment and recommendations of topical management. Patient H/O of ESRD on HD via left AVF MWF, CVA, BKA/AKA, functionally quadriplegic, CAD, HTN, HLD, history of O2 as needed presents for evaluation of vomiting.  Pt admitted for sepsis 2/2 colitis. Course c/b MRSA bacteremia.   Chart reviewed: WBC 4.36, H/H 12.1/38.1, platelets 252, Serum albumin 2.8, Iglesia 11.

## 2024-05-01 NOTE — ADVANCED PRACTICE NURSE CONSULT - ASSESSMENT
General: A&Ox1 disoriented to time, place and situation, bedbound, Left AKA, right BKA, incontinent of urine and stool, perineal care provided for formed stool. Patient thin with muscle wasting. Skin warm, dry, scattered areas of hyperpigmentation and hypopigmentation, Blanchable erythema on bilateral heels.     Sacrum to bilateral buttocks with hypo/hyperpigmentation with dry flaky skin as evidence of previous skin impairment. No open areas noted. Risk for IAD.

## 2024-05-01 NOTE — PROGRESS NOTE ADULT - ASSESSMENT
69 year old M with PMH ESRD on HD via left AVF MWF, CVA, BKA/AKA, functionally quadriplegic, CAD, HTN, HLD, history of O2 as needed presents for evaluation of vomiting.  Pt admitted for sepsis 2/2 colitis. Course c/b MRSA bacteremia.

## 2024-05-01 NOTE — PROGRESS NOTE ADULT - SUBJECTIVE AND OBJECTIVE BOX
aa Patient is a 69y old  Male who presents with a chief complaint of nausea and vomiting (01 May 2024 10:28)      SUBJECTIVE / OVERNIGHT EVENTS:    No events overnight. This AM, CATRACHO PATRICK feels well. Has no n/v/d/cp/sob.      MEDICATIONS  (STANDING):  albuterol/ipratropium for Nebulization 3 milliLiter(s) Nebulizer every 6 hours  ascorbic acid 500 milliGRAM(s) Oral daily  aspirin enteric coated 81 milliGRAM(s) Oral daily  atorvastatin 40 milliGRAM(s) Oral at bedtime  carvedilol 6.25 milliGRAM(s) Oral every 12 hours  chlorhexidine 2% Cloths 1 Application(s) Topical daily  furosemide    Tablet 40 milliGRAM(s) Oral two times a day  heparin   Injectable 5000 Unit(s) SubCutaneous every 12 hours  lactobacillus acidophilus 1 Tablet(s) Oral daily  lactulose Syrup 10 Gram(s) Oral daily  midodrine. 5 milliGRAM(s) Oral <User Schedule>  mupirocin 2% Ointment 1 Application(s) Both Nostrils two times a day  Nephro-hannah 1 Tablet(s) Oral daily  pantoprazole    Tablet 40 milliGRAM(s) Oral before breakfast  polyethylene glycol 3350 17 Gram(s) Oral daily  senna 2 Tablet(s) Oral at bedtime  sodium chloride 3%  Inhalation 4 milliLiter(s) Inhalation every 6 hours    MEDICATIONS  (PRN):  acetaminophen     Tablet .. 650 milliGRAM(s) Oral every 6 hours PRN Temp greater or equal to 38C (100.4F), Mild Pain (1 - 3)  aluminum hydroxide/magnesium hydroxide/simethicone Suspension 30 milliLiter(s) Oral every 4 hours PRN Dyspepsia  melatonin 3 milliGRAM(s) Oral at bedtime PRN Insomnia  ondansetron Injectable 4 milliGRAM(s) IV Push every 8 hours PRN Nausea and/or Vomiting  oxyCODONE    IR 5 milliGRAM(s) Oral every 6 hours PRN moderate to severe pain  sodium chloride 0.9% Bolus. 100 milliLiter(s) IV Bolus every 5 minutes PRN SBP LESS THAN or EQUAL to 80 mmHg      PHYSICAL EXAM:  T(C): 36 (05-01-24 @ 10:45), Max: 37.1 (04-30-24 @ 21:35)  HR: 83 (05-01-24 @ 10:45) (70 - 83)  BP: 155/68 (05-01-24 @ 10:45) (142/72 - 174/79)  RR: 18 (05-01-24 @ 10:45) (18 - 18)  SpO2: 99% (05-01-24 @ 05:10) (98% - 100%)  I&O's Summary    30 Apr 2024 07:01  -  01 May 2024 07:00  --------------------------------------------------------  IN: 220 mL / OUT: 0 mL / NET: 220 mL    01 May 2024 07:01  -  01 May 2024 14:48  --------------------------------------------------------  IN: 400 mL / OUT: 2400 mL / NET: -2000 mL      GENERAL: NAD, lying in bed  HEAD:  Atraumatic, Normocephalic, MMM  CHEST/LUNG: No use of accessory muscles, CTAB, breathing non-labored  COR: RR, no mrcg  ABD: Soft, ND/NT, +BS  PSYCH: AAOxself  NEUROLOGY: CN II-XII grossly intact, moving all extremities  SKIN: No rashes or lesions  EXT: wwp, no cce; LUE AVF    LABS:  CAPILLARY BLOOD GLUCOSE      POCT Blood Glucose.: 122 mg/dL (01 May 2024 11:29)  POCT Blood Glucose.: 117 mg/dL (01 May 2024 10:25)  POCT Blood Glucose.: 103 mg/dL (01 May 2024 09:08)  POCT Blood Glucose.: 106 mg/dL (30 Apr 2024 21:58)  POCT Blood Glucose.: 103 mg/dL (30 Apr 2024 17:15)  POCT Blood Glucose.: 71 mg/dL (30 Apr 2024 15:41)                          12.1   4.36  )-----------( 252      ( 01 May 2024 05:04 )             38.1     05-01    134<L>  |  92<L>  |  28<H>  ----------------------------<  74  4.6   |  30  |  5.05<H>    Ca    8.6      01 May 2024 05:04  Phos  4.3     05-01  Mg     3.60     05-01            Urinalysis Basic - ( 01 May 2024 05:04 )    Color: x / Appearance: x / SG: x / pH: x  Gluc: 74 mg/dL / Ketone: x  / Bili: x / Urobili: x   Blood: x / Protein: x / Nitrite: x   Leuk Esterase: x / RBC: x / WBC x   Sq Epi: x / Non Sq Epi: x / Bacteria: x        Culture - Blood (collected 29 Apr 2024 14:43)  Source: .Blood Blood-Venous  Preliminary Report (30 Apr 2024 17:02):    No growth at 24 hours    Culture - Blood (collected 29 Apr 2024 11:01)  Source: .Blood Blood-Peripheral  Preliminary Report (30 Apr 2024 17:02):    No growth at 24 hours        RADIOLOGY & ADDITIONAL TESTS:    Telemetry Personally Reviewed -     Imaging Personally Reviewed -     Imaging Reviewed -     Consultant(s) Notes Reviewed -       Care Discussed with Consultants/Other Providers -

## 2024-05-01 NOTE — PROVIDER CONTACT NOTE (OTHER) - ACTION/TREATMENT ORDERED:
Issac Crawford notified over the phone. PRN PO Tylenol administered. Pain relieved. BP rechecked and resulted 155/73 and HR 75. Will continue to monitor overnight. Safety maintained.

## 2024-05-01 NOTE — CONSULT NOTE ADULT - ASSESSMENT
69 year old M with PMH ESRD on HD via left AVF MWF, CVA, BKA/AKA, functionally quadriplegic, CAD, HTN, HLD, history of O2 as needed presents for evaluation of vomiting. Cardiology was previously consulted for low EF on  CT initially and now reconsulted for severely reduced EF on ISRAEL.    #HFrEF  Severely reduced EF detected on ISRAEL with reduction of the EF since last year.   -Continue with coreg 6.25mg BID  -Continue with furosemide 40mg BID and dialysis as per nephrology  -Prior TTE 6/2023 showing mildly decreased global LV systolic function of 40-45%  -Patient on midodrine at this time but has elevated pressures consistently   -Start the patient on entresto 24-26mg BID if covered by insurance. If not able, can start losartan 25mg daily  -If BP tolerates with above, can also start on spironolactone if ok from renal perspective  -IF the patient is unable to tolerate dialysis due to these meds, can decrease dose or d/c  -Check if SGLT-2i are covered, if so can start as outpatient  -No plan for ischemic eval at this time.     Veena Price MD  Cardiology fellow 69 year old M with PMH ESRD on HD via left AVF MWF, CVA, BKA/AKA, functionally quadriplegic, CAD, HTN, HLD, history of O2 as needed presents for evaluation of vomiting. Cardiology was previously consulted for low EF on  CT initially and now reconsulted for severely reduced EF on ISRAEL.    #HFrEF  Severely reduced EF detected on ISRAEL with reduction of the EF since last year.   -Continue with coreg 6.25mg BID  -Continue with furosemide 40mg PO BID and dialysis as per nephrology  -Prior TTE 6/2023 showing mildly decreased global LV systolic function of 40-45%  -Patient on midodrine at this time but has elevated pressures consistently   -Start the patient on entresto 24-26mg BID if covered by insurance. If not able, can start losartan 25mg daily  -If BP tolerates with above, can also start on spironolactone if ok from renal perspective  -IF the patient is unable to tolerate dialysis due to these meds, can decrease dose or d/c  -Check if SGLT-2i are covered, if so can start as outpatient  -No plan for ischemic eval at this time.     Veena Price MD  Cardiology fellow

## 2024-05-01 NOTE — PROGRESS NOTE ADULT - SUBJECTIVE AND OBJECTIVE BOX
ANESTHESIA POSTOP CHECK    69y Male POSTOP DAY 1    Vital Signs Last 24 Hrs  T(C): 36.1 (01 May 2024 07:30), Max: 37.1 (30 Apr 2024 21:35)  T(F): 97 (01 May 2024 07:30), Max: 98.8 (30 Apr 2024 21:35)  HR: 81 (01 May 2024 07:30) (70 - 81)  BP: 174/79 (01 May 2024 07:30) (142/72 - 174/79)  RR: 18 (01 May 2024 07:30) (18 - 18)  SpO2: 99% (01 May 2024 05:10) (97% - 100%)    Parameters below as of 01 May 2024 07:30  Patient On (Oxygen Delivery Method): room air              [x ] NO APPARENT ANESTHESIA COMPLICATIONS

## 2024-05-01 NOTE — PROGRESS NOTE ADULT - PROBLEM SELECTOR PLAN 6
Need to verify Percocet - noted on HD paperwork but not seen on [place]    Added oxy 5 mg q6h prn for mod to severe pain  DVT ppx: heparin bid   Diet: Passed dysphagia screen dysphagia screen,. Renal DASH TLC easy to chew  Plan of care discussed with hayden Vincent [phone] on 4/29. All questions answered. Attempted to call again on 5/1 but went to .

## 2024-05-02 LAB
A1C WITH ESTIMATED AVERAGE GLUCOSE RESULT: 4.8 % — SIGNIFICANT CHANGE UP (ref 4–5.6)
ADD ON TEST-SPECIMEN IN LAB: SIGNIFICANT CHANGE UP
ADD ON TEST-SPECIMEN IN LAB: SIGNIFICANT CHANGE UP
ANION GAP SERPL CALC-SCNC: 14 MMOL/L — SIGNIFICANT CHANGE UP (ref 7–14)
BASE EXCESS BLDV CALC-SCNC: 6.7 MMOL/L — HIGH (ref -2–3)
BASOPHILS # BLD AUTO: 0.07 K/UL — SIGNIFICANT CHANGE UP (ref 0–0.2)
BASOPHILS NFR BLD AUTO: 1.6 % — SIGNIFICANT CHANGE UP (ref 0–2)
BLOOD GAS VENOUS COMPREHENSIVE RESULT: SIGNIFICANT CHANGE UP
BUN SERPL-MCNC: 17 MG/DL — SIGNIFICANT CHANGE UP (ref 7–23)
CALCIUM SERPL-MCNC: 8.9 MG/DL — SIGNIFICANT CHANGE UP (ref 8.4–10.5)
CHLORIDE BLDV-SCNC: 98 MMOL/L — SIGNIFICANT CHANGE UP (ref 96–108)
CHLORIDE SERPL-SCNC: 95 MMOL/L — LOW (ref 98–107)
CO2 BLDV-SCNC: 33.9 MMOL/L — HIGH (ref 22–26)
CO2 SERPL-SCNC: 28 MMOL/L — SIGNIFICANT CHANGE UP (ref 22–31)
CREAT SERPL-MCNC: 3.73 MG/DL — HIGH (ref 0.5–1.3)
CULTURE RESULTS: SIGNIFICANT CHANGE UP
EGFR: 17 ML/MIN/1.73M2 — LOW
EOSINOPHIL # BLD AUTO: 0.28 K/UL — SIGNIFICANT CHANGE UP (ref 0–0.5)
EOSINOPHIL NFR BLD AUTO: 6.5 % — HIGH (ref 0–6)
ESTIMATED AVERAGE GLUCOSE: 91 — SIGNIFICANT CHANGE UP
GAS PNL BLDV: 130 MMOL/L — LOW (ref 136–145)
GLUCOSE BLDC GLUCOMTR-MCNC: 116 MG/DL — HIGH (ref 70–99)
GLUCOSE BLDC GLUCOMTR-MCNC: 68 MG/DL — LOW (ref 70–99)
GLUCOSE BLDC GLUCOMTR-MCNC: 70 MG/DL — SIGNIFICANT CHANGE UP (ref 70–99)
GLUCOSE BLDC GLUCOMTR-MCNC: 75 MG/DL — SIGNIFICANT CHANGE UP (ref 70–99)
GLUCOSE BLDC GLUCOMTR-MCNC: 77 MG/DL — SIGNIFICANT CHANGE UP (ref 70–99)
GLUCOSE BLDC GLUCOMTR-MCNC: 79 MG/DL — SIGNIFICANT CHANGE UP (ref 70–99)
GLUCOSE BLDC GLUCOMTR-MCNC: 86 MG/DL — SIGNIFICANT CHANGE UP (ref 70–99)
GLUCOSE BLDV-MCNC: 70 MG/DL — SIGNIFICANT CHANGE UP (ref 70–99)
GLUCOSE SERPL-MCNC: 58 MG/DL — LOW (ref 70–99)
HCO3 BLDV-SCNC: 32 MMOL/L — HIGH (ref 22–29)
HCT VFR BLD CALC: 39.3 % — SIGNIFICANT CHANGE UP (ref 39–50)
HCT VFR BLDA CALC: 37 % — LOW (ref 39–51)
HGB BLD CALC-MCNC: 12.3 G/DL — LOW (ref 12.6–17.4)
HGB BLD-MCNC: 12.5 G/DL — LOW (ref 13–17)
IANC: 2.33 K/UL — SIGNIFICANT CHANGE UP (ref 1.8–7.4)
IMM GRANULOCYTES NFR BLD AUTO: 0.5 % — SIGNIFICANT CHANGE UP (ref 0–0.9)
LACTATE BLDV-MCNC: 1.6 MMOL/L — SIGNIFICANT CHANGE UP (ref 0.5–2)
LYMPHOCYTES # BLD AUTO: 0.95 K/UL — LOW (ref 1–3.3)
LYMPHOCYTES # BLD AUTO: 22.1 % — SIGNIFICANT CHANGE UP (ref 13–44)
MAGNESIUM SERPL-MCNC: 3.4 MG/DL — HIGH (ref 1.6–2.6)
MCHC RBC-ENTMCNC: 27.5 PG — SIGNIFICANT CHANGE UP (ref 27–34)
MCHC RBC-ENTMCNC: 31.8 GM/DL — LOW (ref 32–36)
MCV RBC AUTO: 86.4 FL — SIGNIFICANT CHANGE UP (ref 80–100)
MONOCYTES # BLD AUTO: 0.65 K/UL — SIGNIFICANT CHANGE UP (ref 0–0.9)
MONOCYTES NFR BLD AUTO: 15.1 % — HIGH (ref 2–14)
NEUTROPHILS # BLD AUTO: 2.33 K/UL — SIGNIFICANT CHANGE UP (ref 1.8–7.4)
NEUTROPHILS NFR BLD AUTO: 54.2 % — SIGNIFICANT CHANGE UP (ref 43–77)
NRBC # BLD: 0 /100 WBCS — SIGNIFICANT CHANGE UP (ref 0–0)
NRBC # FLD: 0 K/UL — SIGNIFICANT CHANGE UP (ref 0–0)
PCO2 BLDV: 50 MMHG — SIGNIFICANT CHANGE UP (ref 42–55)
PH BLDV: 7.42 — SIGNIFICANT CHANGE UP (ref 7.32–7.43)
PHOSPHATE SERPL-MCNC: 3.7 MG/DL — SIGNIFICANT CHANGE UP (ref 2.5–4.5)
PLATELET # BLD AUTO: 246 K/UL — SIGNIFICANT CHANGE UP (ref 150–400)
PO2 BLDV: 52 MMHG — HIGH (ref 25–45)
POTASSIUM BLDV-SCNC: 4.2 MMOL/L — SIGNIFICANT CHANGE UP (ref 3.5–5.1)
POTASSIUM SERPL-MCNC: 3.9 MMOL/L — SIGNIFICANT CHANGE UP (ref 3.5–5.3)
POTASSIUM SERPL-SCNC: 3.9 MMOL/L — SIGNIFICANT CHANGE UP (ref 3.5–5.3)
RBC # BLD: 4.55 M/UL — SIGNIFICANT CHANGE UP (ref 4.2–5.8)
RBC # FLD: 20 % — HIGH (ref 10.3–14.5)
SAO2 % BLDV: 84.5 % — SIGNIFICANT CHANGE UP (ref 67–88)
SODIUM SERPL-SCNC: 137 MMOL/L — SIGNIFICANT CHANGE UP (ref 135–145)
SPECIMEN SOURCE: SIGNIFICANT CHANGE UP
VANCOMYCIN FLD-MCNC: 21 UG/ML — SIGNIFICANT CHANGE UP
WBC # BLD: 3.84 K/UL — SIGNIFICANT CHANGE UP (ref 3.8–10.5)
WBC # FLD AUTO: 3.84 K/UL — SIGNIFICANT CHANGE UP (ref 3.8–10.5)

## 2024-05-02 PROCEDURE — 99232 SBSQ HOSP IP/OBS MODERATE 35: CPT

## 2024-05-02 PROCEDURE — 71045 X-RAY EXAM CHEST 1 VIEW: CPT | Mod: 26

## 2024-05-02 RX ORDER — SACUBITRIL AND VALSARTAN 24; 26 MG/1; MG/1
1 TABLET, FILM COATED ORAL
Qty: 60 | Refills: 0
Start: 2024-05-02 | End: 2024-05-31

## 2024-05-02 RX ORDER — OXYCODONE HYDROCHLORIDE 5 MG/1
5 TABLET ORAL EVERY 6 HOURS
Refills: 0 | Status: DISCONTINUED | OUTPATIENT
Start: 2024-05-02 | End: 2024-05-09

## 2024-05-02 RX ADMIN — MUPIROCIN 1 APPLICATION(S): 20 OINTMENT TOPICAL at 06:25

## 2024-05-02 RX ADMIN — Medication 3 MILLILITER(S): at 03:22

## 2024-05-02 RX ADMIN — Medication 1 TABLET(S): at 12:11

## 2024-05-02 RX ADMIN — ATORVASTATIN CALCIUM 40 MILLIGRAM(S): 80 TABLET, FILM COATED ORAL at 21:10

## 2024-05-02 RX ADMIN — Medication 3 MILLILITER(S): at 15:11

## 2024-05-02 RX ADMIN — SODIUM CHLORIDE 4 MILLILITER(S): 9 INJECTION INTRAMUSCULAR; INTRAVENOUS; SUBCUTANEOUS at 11:12

## 2024-05-02 RX ADMIN — LOSARTAN POTASSIUM 25 MILLIGRAM(S): 100 TABLET, FILM COATED ORAL at 06:00

## 2024-05-02 RX ADMIN — CARVEDILOL PHOSPHATE 6.25 MILLIGRAM(S): 80 CAPSULE, EXTENDED RELEASE ORAL at 17:10

## 2024-05-02 RX ADMIN — HEPARIN SODIUM 5000 UNIT(S): 5000 INJECTION INTRAVENOUS; SUBCUTANEOUS at 17:10

## 2024-05-02 RX ADMIN — Medication 1 TABLET(S): at 12:16

## 2024-05-02 RX ADMIN — POLYETHYLENE GLYCOL 3350 17 GRAM(S): 17 POWDER, FOR SOLUTION ORAL at 12:12

## 2024-05-02 RX ADMIN — Medication 3 MILLILITER(S): at 11:13

## 2024-05-02 RX ADMIN — LACTULOSE 10 GRAM(S): 10 SOLUTION ORAL at 12:11

## 2024-05-02 RX ADMIN — CHLORHEXIDINE GLUCONATE 1 APPLICATION(S): 213 SOLUTION TOPICAL at 12:26

## 2024-05-02 RX ADMIN — SODIUM CHLORIDE 4 MILLILITER(S): 9 INJECTION INTRAMUSCULAR; INTRAVENOUS; SUBCUTANEOUS at 03:23

## 2024-05-02 RX ADMIN — MUPIROCIN 1 APPLICATION(S): 20 OINTMENT TOPICAL at 17:10

## 2024-05-02 RX ADMIN — HEPARIN SODIUM 5000 UNIT(S): 5000 INJECTION INTRAVENOUS; SUBCUTANEOUS at 06:00

## 2024-05-02 RX ADMIN — SODIUM CHLORIDE 4 MILLILITER(S): 9 INJECTION INTRAMUSCULAR; INTRAVENOUS; SUBCUTANEOUS at 21:44

## 2024-05-02 RX ADMIN — PANTOPRAZOLE SODIUM 40 MILLIGRAM(S): 20 TABLET, DELAYED RELEASE ORAL at 06:00

## 2024-05-02 RX ADMIN — Medication 500 MILLIGRAM(S): at 12:16

## 2024-05-02 RX ADMIN — Medication 81 MILLIGRAM(S): at 12:12

## 2024-05-02 RX ADMIN — Medication 3 MILLILITER(S): at 21:42

## 2024-05-02 RX ADMIN — SODIUM CHLORIDE 4 MILLILITER(S): 9 INJECTION INTRAMUSCULAR; INTRAVENOUS; SUBCUTANEOUS at 15:11

## 2024-05-02 RX ADMIN — CARVEDILOL PHOSPHATE 6.25 MILLIGRAM(S): 80 CAPSULE, EXTENDED RELEASE ORAL at 06:00

## 2024-05-02 RX ADMIN — SENNA PLUS 2 TABLET(S): 8.6 TABLET ORAL at 21:10

## 2024-05-02 NOTE — PROGRESS NOTE ADULT - SUBJECTIVE AND OBJECTIVE BOX
Patient is a 69y old  Male who presents with a chief complaint of nausea and vomiting (02 May 2024 11:05)      SUBJECTIVE / OVERNIGHT EVENTS:    No events overnight. This AM, CATRACHO PATRICK feels well. Has no n/v/d/cp/sob.      MEDICATIONS  (STANDING):  albuterol/ipratropium for Nebulization 3 milliLiter(s) Nebulizer every 6 hours  ascorbic acid 500 milliGRAM(s) Oral daily  aspirin enteric coated 81 milliGRAM(s) Oral daily  atorvastatin 40 milliGRAM(s) Oral at bedtime  carvedilol 6.25 milliGRAM(s) Oral every 12 hours  chlorhexidine 2% Cloths 1 Application(s) Topical daily  heparin   Injectable 5000 Unit(s) SubCutaneous every 12 hours  lactobacillus acidophilus 1 Tablet(s) Oral daily  lactulose Syrup 10 Gram(s) Oral daily  mupirocin 2% Ointment 1 Application(s) Both Nostrils two times a day  Nephro-hannah 1 Tablet(s) Oral daily  pantoprazole    Tablet 40 milliGRAM(s) Oral before breakfast  polyethylene glycol 3350 17 Gram(s) Oral daily  senna 2 Tablet(s) Oral at bedtime  sodium chloride 3%  Inhalation 4 milliLiter(s) Inhalation every 6 hours    MEDICATIONS  (PRN):  acetaminophen     Tablet .. 650 milliGRAM(s) Oral every 6 hours PRN Temp greater or equal to 38C (100.4F), Mild Pain (1 - 3)  aluminum hydroxide/magnesium hydroxide/simethicone Suspension 30 milliLiter(s) Oral every 4 hours PRN Dyspepsia  melatonin 3 milliGRAM(s) Oral at bedtime PRN Insomnia  ondansetron Injectable 4 milliGRAM(s) IV Push every 8 hours PRN Nausea and/or Vomiting  oxyCODONE    IR 5 milliGRAM(s) Oral every 6 hours PRN moderate to severe pain  sodium chloride 0.9% Bolus. 100 milliLiter(s) IV Bolus every 5 minutes PRN SBP LESS THAN or EQUAL to 80 mmHg      PHYSICAL EXAM:  T(C): 36.9 (05-02-24 @ 14:11), Max: 36.9 (05-02-24 @ 14:11)  HR: 71 (05-02-24 @ 15:12) (69 - 81)  BP: 104/59 (05-02-24 @ 09:00) (104/59 - 153/77)  RR: 18 (05-02-24 @ 09:00) (17 - 18)  SpO2: 99% (05-02-24 @ 15:12) (94% - 100%)  I&O's Summary    01 May 2024 07:01  -  02 May 2024 07:00  --------------------------------------------------------  IN: 400 mL / OUT: 2400 mL / NET: -2000 mL      GENERAL: NAD, lying in bed  HEAD:  Atraumatic, Normocephalic, MMM  CHEST/LUNG: No use of accessory muscles, CTAB, breathing non-labored  COR: RR, no mrcg  ABD: Soft, ND/NT, +BS  PSYCH: AAOxself  NEUROLOGY: CN II-XII grossly intact, moving all extremities  SKIN: No rashes or lesions  EXT: wwp, s/p b/l BKA    LABS:  CAPILLARY BLOOD GLUCOSE      POCT Blood Glucose.: 77 mg/dL (02 May 2024 16:36)  POCT Blood Glucose.: 86 mg/dL (02 May 2024 14:03)  POCT Blood Glucose.: 116 mg/dL (02 May 2024 11:16)  POCT Blood Glucose.: 79 mg/dL (02 May 2024 08:31)  POCT Blood Glucose.: 68 mg/dL (02 May 2024 07:10)  POCT Blood Glucose.: 70 mg/dL (02 May 2024 07:08)  POCT Blood Glucose.: 107 mg/dL (01 May 2024 21:27)  POCT Blood Glucose.: 90 mg/dL (01 May 2024 16:50)                          12.5   3.84  )-----------( 246      ( 02 May 2024 06:15 )             39.3     05-02    137  |  95<L>  |  17  ----------------------------<  58<L>  3.9   |  28  |  3.73<H>    Ca    8.9      02 May 2024 06:15  Phos  3.7     05-02  Mg     3.40     05-02            Urinalysis Basic - ( 02 May 2024 06:15 )    Color: x / Appearance: x / SG: x / pH: x  Gluc: 58 mg/dL / Ketone: x  / Bili: x / Urobili: x   Blood: x / Protein: x / Nitrite: x   Leuk Esterase: x / RBC: x / WBC x   Sq Epi: x / Non Sq Epi: x / Bacteria: x          RADIOLOGY & ADDITIONAL TESTS:    Telemetry Personally Reviewed -     Imaging Personally Reviewed -     Imaging Reviewed -     Consultant(s) Notes Reviewed -       Care Discussed with Consultants/Other Providers -

## 2024-05-02 NOTE — CHART NOTE - NSCHARTNOTEFT_GEN_A_CORE
Pt seen for MODERATE MALNUTRITION FOLLOW UP     Medical Course: 69 year old male with PMH ESRD on HD via left AVF MWF, CVA, BKA/AKA, functionally quadriplegic, CAD, HTN, HLD, history of O2 as needed presents for evaluation of vomiting.  Pt admitted for sepsis 2/2 colitis. Course c/b MRSA bacteremia.        Nutrition Course: Unable to conduct nutrition interview 2/2 decreased cognition. Information obtained from comprehensive chart review and per RN today: No recent episodes of nausea, vomiting, diarrhea, or constipation. Last BM noted 4/30 per RN flowsheets. Intake is 0-50% per RN flowsheets and per RN today. Feeding Skills: total assistance required from staff for eating. RN states Pt not having difficulty with chewing or swallowing, however mostly consumes beverages meal trays. RD suggested providing pureed tray at lunch to try. Per RN Pt consumed 50% of pureed tray today. RD will suggest either diet change per MD discretion to pureed, and/or SLP consult to determine appropriate consistency for Pt. Food preferences explored with RN today. Per last RD note Pt was supposed to be ordered for might shake 1x/day and magic cup 2x/day, however not noted in [employer] menu system today.       Diet Prescription: Diet, Regular:   Easy to Chew (EASYTOCHEW)  For patients receiving Renal Replacement - No Protein Restr, No Conc K, No Conc Phos, Low Sodium (RENAL) (04-26-24 @ 15:57)    Pertinent Medications: MEDICATIONS  (STANDING):  albuterol/ipratropium for Nebulization 3 milliLiter(s) Nebulizer every 6 hours  ascorbic acid 500 milliGRAM(s) Oral daily  aspirin enteric coated 81 milliGRAM(s) Oral daily  atorvastatin 40 milliGRAM(s) Oral at bedtime  carvedilol 6.25 milliGRAM(s) Oral every 12 hours  chlorhexidine 2% Cloths 1 Application(s) Topical daily  heparin   Injectable 5000 Unit(s) SubCutaneous every 12 hours  lactobacillus acidophilus 1 Tablet(s) Oral daily  lactulose Syrup 10 Gram(s) Oral daily  losartan 25 milliGRAM(s) Oral daily  mupirocin 2% Ointment 1 Application(s) Both Nostrils two times a day  Nephro-hannah 1 Tablet(s) Oral daily  pantoprazole    Tablet 40 milliGRAM(s) Oral before breakfast  polyethylene glycol 3350 17 Gram(s) Oral daily  senna 2 Tablet(s) Oral at bedtime  sodium chloride 3%  Inhalation 4 milliLiter(s) Inhalation every 6 hours    MEDICATIONS  (PRN):  acetaminophen     Tablet .. 650 milliGRAM(s) Oral every 6 hours PRN Temp greater or equal to 38C (100.4F), Mild Pain (1 - 3)  aluminum hydroxide/magnesium hydroxide/simethicone Suspension 30 milliLiter(s) Oral every 4 hours PRN Dyspepsia  melatonin 3 milliGRAM(s) Oral at bedtime PRN Insomnia  ondansetron Injectable 4 milliGRAM(s) IV Push every 8 hours PRN Nausea and/or Vomiting  oxyCODONE    IR 5 milliGRAM(s) Oral every 6 hours PRN moderate to severe pain  sodium chloride 0.9% Bolus. 100 milliLiter(s) IV Bolus every 5 minutes PRN SBP LESS THAN or EQUAL to 80 mmHg    Pertinent Labs: 05-02 Na137 mmol/L Glu 58 mg/dL<L> K+ 3.9 mmol/L Cr  3.73 mg/dL<H> BUN 17 mg/dL 05-02 Phos 3.7 mg/dL      POCT Blood Glucose.: 116 mg/dL (02 May 2024 11:16)  POCT Blood Glucose.: 79 mg/dL (02 May 2024 08:31)  POCT Blood Glucose.: 68 mg/dL (02 May 2024 07:10)  POCT Blood Glucose.: 70 mg/dL (02 May 2024 07:08)  POCT Blood Glucose.: 107 mg/dL (01 May 2024 21:27)  POCT Blood Glucose.: 90 mg/dL (01 May 2024 16:50)      Weight (kg): 49 (04-26 @ 05:30)  Weight Assessment: no new wt to assess.    No height noted in chart to assess BMI (Pt with R LOGAN and L DREA)      Physical Assessment, per flowsheets:  Edema: none   Pressure Injury: sacrum- healed      Estimated Needs:   [X] No change since previous assessment    Previous Nutrition Diagnosis: [x ] Malnutrition   Nutrition Diagnosis is [ x] ongoing  [ ] resolved [ ] not applicable   New Nutrition Diagnosis: [ x] not applicable     Education:  [  ] Given today        Type of education provided:   [  ] Given on previous assessment by RD   [ x ] Not applicable 2/2 cognitive deficit  [  ] Pt refusal of education offered   [  ] Not applicable 2/2 current prognosis  [  ] Not warranted at present    Interventions:   1) Consider downgrading diet to Pureed consistency, or SLP consult to determine appropriate consistency   2) Continue to provide Nephro-hannah and Vitamin C for micronutrient provisions  3) Obtain new weight  4)  department to provide Magic Cup 2x/day (580 kcal, 18 gm protein) and Might Shake 1x/day to promote optimal PO intake   5) RD to f/u prn    Monitor & Evaluate:  PO intake, tolerance to diet/supplement, nutrition related lab values, weight trends, BMs/GI distress, hydration status, skin integrity.    Martha Valencia MS, RDN (Pager #[phone]) | Also available on TEAMS

## 2024-05-02 NOTE — CHART NOTE - NSCHARTNOTEFT_GEN_A_CORE
MEDICINE PA NOTE     Seen by bedside during rounds and noted to be diaphoretic. RPT FS 86 and per discussion with team patient ate >50% of his meal with puree diet compared to regular. Temperature checked second to desaturation overnight and CXR with questionable RML opacity, however rectal 98F. No WBC and will change diet to puree for now. FS ACHS for now and calorie count/ meal intake to be monitored. Will monitor for now.     JANEEN Mullins, PA-C  Department of Medicine/ [facility]  In house [facility] [place] [phone]  In house Medicine [facility] [id number]  Reachable via teams MEDICINE PA NOTE     Seen by bedside during rounds and noted to be diaphoretic. RPT FS 86 and per discussion with team patient ate >50% of his meal with puree diet compared to regular. Temperature checked second to desaturation overnight and CXR with questionable RML opacity, however rectal 98F. No WBC and will change diet to puree for now. FS ACHS for now and calorie count/ meal intake to be monitored. Attempting GDMT with cozaar and coreg, however BP running soft () and will hold cozaar. Will monitor for now.     Dale Walker, ALS, PA-C  Department of Medicine/ [facility]  In house [facility] [place] [phone]  In house Medicine Bee[facility] [phone]  Reachable via teams

## 2024-05-02 NOTE — PROGRESS NOTE ADULT - SUBJECTIVE AND OBJECTIVE BOX
[facility] [place] [facility] NEPHROLOGY PRACTICE   MD MARIBELL CARRION MD RUORU WONG, PA    TEL:  FROM 9 AM to 5 PM ---OFFICE: [phone]  AVAILABLE ON TEAMS    FROM 5 PM - 9 AM PLEASE CALL ANSWERING SERVICE: [phone]    RENAL FOLLOW UP NOTE--Date of Service 05-02-24 @ 11:05  --------------------------------------------------------------------------------  HPI:      Pt seen and examined at bedside.       PAST HISTORY  --------------------------------------------------------------------------------  No significant changes to PMH, PSH, FHx, SHx, unless otherwise noted    ALLERGIES & MEDICATIONS  --------------------------------------------------------------------------------  Allergies    No Known Allergies    Intolerances      Standing Inpatient Medications  albuterol/ipratropium for Nebulization 3 milliLiter(s) Nebulizer every 6 hours  ascorbic acid 500 milliGRAM(s) Oral daily  aspirin enteric coated 81 milliGRAM(s) Oral daily  atorvastatin 40 milliGRAM(s) Oral at bedtime  carvedilol 6.25 milliGRAM(s) Oral every 12 hours  chlorhexidine 2% Cloths 1 Application(s) Topical daily  heparin   Injectable 5000 Unit(s) SubCutaneous every 12 hours  lactobacillus acidophilus 1 Tablet(s) Oral daily  lactulose Syrup 10 Gram(s) Oral daily  losartan 25 milliGRAM(s) Oral daily  mupirocin 2% Ointment 1 Application(s) Both Nostrils two times a day  Nephro-hannah 1 Tablet(s) Oral daily  pantoprazole    Tablet 40 milliGRAM(s) Oral before breakfast  polyethylene glycol 3350 17 Gram(s) Oral daily  senna 2 Tablet(s) Oral at bedtime  sodium chloride 3%  Inhalation 4 milliLiter(s) Inhalation every 6 hours    PRN Inpatient Medications  acetaminophen     Tablet .. 650 milliGRAM(s) Oral every 6 hours PRN  aluminum hydroxide/magnesium hydroxide/simethicone Suspension 30 milliLiter(s) Oral every 4 hours PRN  melatonin 3 milliGRAM(s) Oral at bedtime PRN  ondansetron Injectable 4 milliGRAM(s) IV Push every 8 hours PRN  oxyCODONE    IR 5 milliGRAM(s) Oral every 6 hours PRN  sodium chloride 0.9% Bolus. 100 milliLiter(s) IV Bolus every 5 minutes PRN      REVIEW OF SYSTEMS  --------------------------------------------------------------------------------  General: no fever  MSK: no edema     VITALS/PHYSICAL EXAM  --------------------------------------------------------------------------------  T(C): 36.4 (05-02-24 @ 09:00), Max: 36.8 (05-01-24 @ 18:05)  HR: 69 (05-02-24 @ 09:00) (69 - 83)  BP: 104/59 (05-02-24 @ 09:00) (104/59 - 153/77)  RR: 18 (05-02-24 @ 09:00) (17 - 18)  SpO2: 94% (05-02-24 @ 09:00) (94% - 100%)  Wt(kg): --        05-01-24 @ 07:01  -  05-02-24 @ 07:00  --------------------------------------------------------  IN: 400 mL / OUT: 2400 mL / NET: -2000 mL      Physical Exam:  	Gen: NAD  	HEENT: MMM  	Pulm: CTA B/L  	CV: S1S2  	Abd: Soft, +BS  	Ext: No LE edema B/L                      Neuro: Awake   	Skin: Warm and Dry   	Vascular access: avf           LABS/STUDIES  --------------------------------------------------------------------------------              12.5   3.84  >-----------<  246      [05-02-24 @ 06:15]              39.3     137  |  95  |  17  ----------------------------<  58      [05-02-24 @ 06:15]  3.9   |  28  |  3.73        Ca     8.9     [05-02-24 @ 06:15]      Mg     3.40     [05-02-24 @ 06:15]      Phos  3.7     [05-02-24 @ 06:15]            Creatinine Trend:  SCr 3.73 [05-02 @ 06:15]  SCr 5.05 [05-01 @ 05:04]  SCr 3.93 [04-30 @ 06:16]  SCr 5.51 [04-29 @ 05:05]  SCr 4.18 [04-28 @ 05:00]    Urinalysis - [05-02-24 @ 06:15]      Color  / Appearance  / SG  / pH       Gluc 58 / Ketone   / Bili  / Urobili        Blood  / Protein  / Leuk Est  / Nitrite       RBC  / WBC  / Hyaline  / Gran  / Sq Epi  / Non Sq Epi  / Bacteria       PTH -- (Ca --)      [04-28-24 @ 05:00]   155  HbA1c 4.2      [07-19-19 @ 09:56]  TSH 1.790      [06-23-23 @ 11:51]  Lipid: chol 119, TG 66, HDL 46, LDL --      [10-07-23 @ 09:30]

## 2024-05-02 NOTE — CHART NOTE - NSCHARTNOTEFT_GEN_A_CORE
RN notified that patient with episodes of desaturation while sleeping and O2 improves after he is woken up. Patient evaluated at bedside. Patient's O2 sat dropped to 85 upon on provider's arrival and patient's O2 improved 98% on RA whne he was woke up. The O2 sat improved immediately as soon as he was woke up. Paitent denies any complaints.   General: no distress. at baseline mental status  Lungs; CTA  hearT: +S1, +S2    Episodes of desaturation    -possible sleep apnea    -cont pulse ox    -will place 2L O2 PRN    -VBG ordered    -may need sleep study outpatient RN notified that patient with episodes of desaturation while sleeping and O2 improves after he is woken up. Patient evaluated at bedside. Patient's O2 sat dropped to 85 upon on provider's arrival and patient's O2 improved 98% on RA when0 he was woken up. The O2 sat improved immediately as soon as he was woken up. Paitent denies any complaints.   General: no distress. at baseline mental status  Lungs; CTA  hearT: +S1, +S2    Episodes of desaturation    -possible sleep apnea    -cont pulse ox    -will place 2L O2 PRN    -VBG ordered    -may need sleep study outpatient

## 2024-05-02 NOTE — PROGRESS NOTE ADULT - ASSESSMENT
69-year-old male with PMH end-stage renal on HD via left aVF, CVA, BKA/AKA, functionally quadriplegic, CAD, HTN, HLD, history of O2 as needed presents for evaluation of vomiting.  Brought from HD, started vomiting while receiving treatment.  Patient unable to endorse any specific complaints, per paperwork AO x 1/0 at baseline.  Patient denies chest pain or difficulty breathing, unable to really clarify about abdominal pain. Nephrology consulted for dialysis needs.    A/P   ESRD  Center: [place]  Nephrologist: Dr. Navarro   Access: L AVF  MWF schedule --  Consent obtained from niece, and proxy, Ramonita Vincent via phone [phone]  Renal diet  Monitor BMP     HTN   BP fluctuating  UF with HD   Monitor BP     Anemia  Above goal   Monitor Hb     CKD-MBD   - acceptable   Monitor Ca, PO4 daily     Nausea/Vomiting  CT A/P shows constipation and wall thickening, worrisome for stercoral colitis  s/p disempaction  Colorectal surgery f/u

## 2024-05-02 NOTE — CHART NOTE - NSCHARTNOTEFT_GEN_A_CORE
MEDICINE PA NOTE     Called by RN for patient with AM FS 68 and RPT 71. Asymptomatic. A1C 4.7 in 10/2023 and no hx of DM2. Will give patient breakfast and recheck FS prelunch. Pending A1C and if prelunch FS and A1C stable then will hold further FS checks.     Dale Walker, ALS, PA-C  Department of Medicine/ RCU  In house RCU [place] [id number]  In house Medicine [facility] [id number]  Reachable via teams

## 2024-05-02 NOTE — PROGRESS NOTE ADULT - SUBJECTIVE AND OBJECTIVE BOX
Infectious Diseases Follow Up:    Patient is a 69y old  Male who presents with a chief complaint of nausea and vomiting (01 May 2024 15:43)      Interval History/ROS:  Pt lethargic today AM, following some commands, however not speaking  Vitals wnl, afebrile. FS was 71, seen with nurse manager and primary nurse, will endorse to primary team     Allergies  No Known Allergies        ANTIMICROBIALS:      Current Abx:     Previous Abx     OTHER MEDS:  MEDICATIONS  (STANDING):  acetaminophen     Tablet .. 650 every 6 hours PRN  albuterol/ipratropium for Nebulization 3 every 6 hours  aluminum hydroxide/magnesium hydroxide/simethicone Suspension 30 every 4 hours PRN  aspirin enteric coated 81 daily  atorvastatin 40 at bedtime  carvedilol 6.25 every 12 hours  heparin   Injectable 5000 every 12 hours  lactulose Syrup 10 daily  losartan 25 daily  melatonin 3 at bedtime PRN  ondansetron Injectable 4 every 8 hours PRN  oxyCODONE    IR 5 every 6 hours PRN  pantoprazole    Tablet 40 before breakfast  polyethylene glycol 3350 17 daily  senna 2 at bedtime  sodium chloride 3%  Inhalation 4 every 6 hours      Vital Signs Last 24 Hrs  T(C): 36.4 (02 May 2024 09:00), Max: 36.8 (01 May 2024 18:05)  T(F): 97.5 (02 May 2024 09:00), Max: 98.2 (01 May 2024 18:05)  HR: 69 (02 May 2024 09:00) (69 - 83)  BP: 104/59 (02 May 2024 09:00) (104/59 - 155/68)  BP(mean): --  RR: 18 (02 May 2024 09:00) (17 - 18)  SpO2: 94% (02 May 2024 09:00) (94% - 100%)    Parameters below as of 02 May 2024 09:00  Patient On (Oxygen Delivery Method): nasal cannula      PHYSICAL EXAM:  GENERAL: NAD, well-developed, lethargic   HEAD:  Atraumatic, Normocephalic  EYES: EOMI, conjunctiva and sclera clear=  CHEST/LUNG: Clear to auscultation bilaterally; No wheeze  HEART: Regular rate and rhythm; No murmurs, rubs, or gallops  ABDOMEN: Soft, Nontender, Nondistended; Bowel sounds present  EXTREMITIES:  R BKA, L AKA, stump without any acute findings, no skin breakdown. WANDER HERNANDEZ   PSYCH: AAOx0, following some commands                           12.5   3.84  )-----------( 246      ( 02 May 2024 06:15 )             39.3       05-02    137  |  95<L>  |  17  ----------------------------<  58<L>  3.9   |  28  |  3.73<H>    Ca    8.9      02 May 2024 06:15  Phos  3.7     05-02  Mg     3.40     05-02        Urinalysis Basic - ( 02 May 2024 06:15 )    Color: x / Appearance: x / SG: x / pH: x  Gluc: 58 mg/dL / Ketone: x  / Bili: x / Urobili: x   Blood: x / Protein: x / Nitrite: x   Leuk Esterase: x / RBC: x / WBC x   Sq Epi: x / Non Sq Epi: x / Bacteria: x        MICROBIOLOGY:  Vancomycin Level, Random: 21.0 ug/mL (05-02-24 @ 06:15)  Vancomycin Level, Random: 22.6 ug/mL (05-01-24 @ 15:32)  v  .Blood Blood-Venous  04-29-24   No growth at 48 Hours  --  --      .Blood Blood-Peripheral  04-29-24   No growth at 48 Hours  --  --      .Blood Blood-Peripheral  04-27-24   Growth in anaerobic bottle: Methicillin Resistant Staphylococcus aureus  See previous culture [id number]  --    Growth in anaerobic bottle: Gram Positive Cocci in Clusters      .Blood Blood-Venous  04-27-24   No growth at 4 days  --  --      .Blood Blood  04-26-24   No growth at 5 days  --  --      .Blood Blood  04-26-24   Growth in aerobic bottle: Methicillin Resistant Staphylococcus aureus  See previous culture [id number]  --    Growth in aerobic bottle: Gram Positive Cocci in Clusters      Clean Catch Clean Catch (Midstream)  04-24-24   No growth  --  --      .Blood Blood-Peripheral  04-24-24   Growth in aerobic and anaerobic bottles: Methicillin Resistant  Staphylococcus aureus  See previous culture [id number]  --    Growth in aerobic bottle: Gram Positive Cocci in Clusters  Growth in anaerobic bottle: Gram Positive Cocci in Clusters      .Blood Blood-Peripheral  04-24-24   Growth in aerobic and anaerobic bottles: Methicillin Resistant  Staphylococcus aureus  Direct identification is available within approximately 3-5  hours either by Blood Panel Multiplexed PCR or Direct  MALDI-TOF. Details: [email or web address]  --  Blood Culture PCR  Methicillin resistant Staphylococcus aureus                RADIOLOGY:

## 2024-05-02 NOTE — PROGRESS NOTE ADULT - PROBLEM SELECTOR PLAN 6
Need to verify Percocet - noted on HD [place]    Added oxy 5 mg q6h prn for mod to severe pain  DVT ppx: heparin bid   Diet: Passed dysphagia screen dysphagia screen, Renal DASH TLC easy to chew  Plan of care discussed with hayden Vincent [phone] on 5/2. All questions answered.

## 2024-05-02 NOTE — PROGRESS NOTE ADULT - ASSESSMENT
This is a 69 M w/ PMhx of ESRD via L AVF, CVA, R BKA, L AKA, bedbound, CAD, HTN, who is presenting to [facility] on 4/25/24 with vomiting.   In the ED, pt was febrile to 101.1, hypotensive to 66/42, hypoxic requiring 5L NC. Labs w/ mild leukocytosis to 11. Lactate 2.2, BCx w/ 4/4 MRSA.     #MRSA bacteremia, high grade  #Septic shock  #Lactic acidosis  #ESRD on HD via L AVF     Overall, 69 M w/ PMhx of ESRD via L AVF, CVA, R BKA, L AKA, bedbound, CAD, HTN admitted with high grade MRSA bacteremia, with no clear source.   CT A/P with stercoral colitis, lower chest with b/l PLEFF, L > R, possible consolidation?  Awaiting CT Chest, TTE/ISRAEL. Source may be PNA? unclear at this time, if bacteremia is persistent without clear source may need NM scan.   BCx from 4/27 1/2 positive, ISRAEL negative, BCx from 4/29 NGTD     Plan:   1. C/w Vancomycin 500 mg after HD, obtain trough prior to HD   2. ISRAEL no vegetations, VA duplex of AVF wnl     OPAT recommendations:   IF BCx from 4/29 remain negative, will plan on 4 week course until 5/27  Vancomycin 500 mg post HD  Weekly CBC, BMP, vancomycin trough       Thank you for this consult. Inpatient ID team will follow peripherally     Hector Umanzor M.D.  Attending Physician  Division of Infectious Diseases  Department of Medicine    Please contact through MS Teams message.  Office: [phone] (after 5 PM or weekend)

## 2024-05-03 LAB
ANION GAP SERPL CALC-SCNC: 10 MMOL/L — SIGNIFICANT CHANGE UP (ref 7–14)
ANION GAP SERPL CALC-SCNC: 12 MMOL/L — SIGNIFICANT CHANGE UP (ref 7–14)
BUN SERPL-MCNC: 13 MG/DL — SIGNIFICANT CHANGE UP (ref 7–23)
BUN SERPL-MCNC: 30 MG/DL — HIGH (ref 7–23)
CALCIUM SERPL-MCNC: 8.1 MG/DL — LOW (ref 8.4–10.5)
CALCIUM SERPL-MCNC: 8.2 MG/DL — LOW (ref 8.4–10.5)
CHLORIDE SERPL-SCNC: 94 MMOL/L — LOW (ref 98–107)
CHLORIDE SERPL-SCNC: 97 MMOL/L — LOW (ref 98–107)
CO2 SERPL-SCNC: 26 MMOL/L — SIGNIFICANT CHANGE UP (ref 22–31)
CO2 SERPL-SCNC: 28 MMOL/L — SIGNIFICANT CHANGE UP (ref 22–31)
CREAT SERPL-MCNC: 3.04 MG/DL — HIGH (ref 0.5–1.3)
CREAT SERPL-MCNC: 5.16 MG/DL — HIGH (ref 0.5–1.3)
EGFR: 11 ML/MIN/1.73M2 — LOW
EGFR: 21 ML/MIN/1.73M2 — LOW
GLUCOSE BLDC GLUCOMTR-MCNC: 114 MG/DL — HIGH (ref 70–99)
GLUCOSE BLDC GLUCOMTR-MCNC: 125 MG/DL — HIGH (ref 70–99)
GLUCOSE BLDC GLUCOMTR-MCNC: 77 MG/DL — SIGNIFICANT CHANGE UP (ref 70–99)
GLUCOSE BLDC GLUCOMTR-MCNC: 83 MG/DL — SIGNIFICANT CHANGE UP (ref 70–99)
GLUCOSE BLDC GLUCOMTR-MCNC: 84 MG/DL — SIGNIFICANT CHANGE UP (ref 70–99)
GLUCOSE BLDC GLUCOMTR-MCNC: 97 MG/DL — SIGNIFICANT CHANGE UP (ref 70–99)
GLUCOSE SERPL-MCNC: 113 MG/DL — HIGH (ref 70–99)
GLUCOSE SERPL-MCNC: 90 MG/DL — SIGNIFICANT CHANGE UP (ref 70–99)
HCT VFR BLD CALC: 33.3 % — LOW (ref 39–50)
HGB BLD-MCNC: 10.6 G/DL — LOW (ref 13–17)
MAGNESIUM SERPL-MCNC: 2.7 MG/DL — HIGH (ref 1.6–2.6)
MAGNESIUM SERPL-MCNC: 3.5 MG/DL — HIGH (ref 1.6–2.6)
MCHC RBC-ENTMCNC: 27.1 PG — SIGNIFICANT CHANGE UP (ref 27–34)
MCHC RBC-ENTMCNC: 31.8 GM/DL — LOW (ref 32–36)
MCV RBC AUTO: 85.2 FL — SIGNIFICANT CHANGE UP (ref 80–100)
NRBC # BLD: 0 /100 WBCS — SIGNIFICANT CHANGE UP (ref 0–0)
NRBC # FLD: 0 K/UL — SIGNIFICANT CHANGE UP (ref 0–0)
PHOSPHATE SERPL-MCNC: 3.2 MG/DL — SIGNIFICANT CHANGE UP (ref 2.5–4.5)
PHOSPHATE SERPL-MCNC: 4.9 MG/DL — HIGH (ref 2.5–4.5)
PLATELET # BLD AUTO: 243 K/UL — SIGNIFICANT CHANGE UP (ref 150–400)
POTASSIUM SERPL-MCNC: 4.6 MMOL/L — SIGNIFICANT CHANGE UP (ref 3.5–5.3)
POTASSIUM SERPL-MCNC: 5.5 MMOL/L — HIGH (ref 3.5–5.3)
POTASSIUM SERPL-SCNC: 4.6 MMOL/L — SIGNIFICANT CHANGE UP (ref 3.5–5.3)
POTASSIUM SERPL-SCNC: 5.5 MMOL/L — HIGH (ref 3.5–5.3)
RBC # BLD: 3.91 M/UL — LOW (ref 4.2–5.8)
RBC # FLD: 19.7 % — HIGH (ref 10.3–14.5)
SODIUM SERPL-SCNC: 132 MMOL/L — LOW (ref 135–145)
SODIUM SERPL-SCNC: 135 MMOL/L — SIGNIFICANT CHANGE UP (ref 135–145)
VANCOMYCIN FLD-MCNC: 19.2 UG/ML — SIGNIFICANT CHANGE UP
WBC # BLD: 4 K/UL — SIGNIFICANT CHANGE UP (ref 3.8–10.5)
WBC # FLD AUTO: 4 K/UL — SIGNIFICANT CHANGE UP (ref 3.8–10.5)

## 2024-05-03 PROCEDURE — 99232 SBSQ HOSP IP/OBS MODERATE 35: CPT

## 2024-05-03 RX ORDER — VANCOMYCIN HCL 1 G
500 VIAL (EA) INTRAVENOUS ONCE
Refills: 0 | Status: COMPLETED | OUTPATIENT
Start: 2024-05-03 | End: 2024-05-03

## 2024-05-03 RX ADMIN — HEPARIN SODIUM 5000 UNIT(S): 5000 INJECTION INTRAVENOUS; SUBCUTANEOUS at 17:03

## 2024-05-03 RX ADMIN — Medication 81 MILLIGRAM(S): at 11:07

## 2024-05-03 RX ADMIN — SODIUM CHLORIDE 4 MILLILITER(S): 9 INJECTION INTRAMUSCULAR; INTRAVENOUS; SUBCUTANEOUS at 21:12

## 2024-05-03 RX ADMIN — PANTOPRAZOLE SODIUM 40 MILLIGRAM(S): 20 TABLET, DELAYED RELEASE ORAL at 05:49

## 2024-05-03 RX ADMIN — Medication 500 MILLIGRAM(S): at 11:07

## 2024-05-03 RX ADMIN — MUPIROCIN 1 APPLICATION(S): 20 OINTMENT TOPICAL at 17:05

## 2024-05-03 RX ADMIN — Medication 3 MILLILITER(S): at 09:42

## 2024-05-03 RX ADMIN — HEPARIN SODIUM 5000 UNIT(S): 5000 INJECTION INTRAVENOUS; SUBCUTANEOUS at 05:48

## 2024-05-03 RX ADMIN — MUPIROCIN 1 APPLICATION(S): 20 OINTMENT TOPICAL at 05:50

## 2024-05-03 RX ADMIN — Medication 3 MILLILITER(S): at 21:11

## 2024-05-03 RX ADMIN — Medication 1 TABLET(S): at 11:08

## 2024-05-03 RX ADMIN — SENNA PLUS 2 TABLET(S): 8.6 TABLET ORAL at 21:48

## 2024-05-03 RX ADMIN — Medication 100 MILLIGRAM(S): at 17:03

## 2024-05-03 RX ADMIN — Medication 3 MILLILITER(S): at 03:53

## 2024-05-03 RX ADMIN — SODIUM CHLORIDE 4 MILLILITER(S): 9 INJECTION INTRAMUSCULAR; INTRAVENOUS; SUBCUTANEOUS at 09:42

## 2024-05-03 RX ADMIN — CARVEDILOL PHOSPHATE 6.25 MILLIGRAM(S): 80 CAPSULE, EXTENDED RELEASE ORAL at 17:03

## 2024-05-03 RX ADMIN — POLYETHYLENE GLYCOL 3350 17 GRAM(S): 17 POWDER, FOR SOLUTION ORAL at 11:08

## 2024-05-03 RX ADMIN — LACTULOSE 10 GRAM(S): 10 SOLUTION ORAL at 11:08

## 2024-05-03 RX ADMIN — CHLORHEXIDINE GLUCONATE 1 APPLICATION(S): 213 SOLUTION TOPICAL at 11:00

## 2024-05-03 RX ADMIN — SODIUM CHLORIDE 4 MILLILITER(S): 9 INJECTION INTRAMUSCULAR; INTRAVENOUS; SUBCUTANEOUS at 03:53

## 2024-05-03 RX ADMIN — ATORVASTATIN CALCIUM 40 MILLIGRAM(S): 80 TABLET, FILM COATED ORAL at 21:48

## 2024-05-03 NOTE — PROGRESS NOTE ADULT - ASSESSMENT
69-year-old male with PMH end-stage renal on HD via left aVF, CVA, BKA/AKA, functionally quadriplegic, CAD, HTN, HLD, history of O2 as needed presents for evaluation of vomiting.  Brought from HD, started vomiting while receiving treatment.  Patient unable to endorse any specific complaints, per paperwork AO x 1/0 at baseline.  Patient denies chest pain or difficulty breathing, unable to really clarify about abdominal pain. Nephrology consulted for dialysis needs.    A/P   ESRD  Center: [place]  Nephrologist: Dr. Navarro   Access: L AVF  MWF schedule --  Consent obtained from niece, and proxy, Ramonita Vincent via phone [phone]  Renal diet  Monitor BMP     HTN   BP fluctuating  UF with HD   Monitor BP     Anemia  Above goal   Monitor Hb     CKD-MBD   - acceptable   Monitor Ca, PO4 daily     Nausea/Vomiting  CT A/P shows constipation and wall thickening, worrisome for stercoral colitis  s/p disempaction  Colorectal surgery f/u

## 2024-05-03 NOTE — PROGRESS NOTE ADULT - SUBJECTIVE AND OBJECTIVE BOX
[facility]   MD MARIBELL CARRION MD RUORU WONG, PA    TEL:  FROM 9 AM to 5 PM ---OFFICE: [phone]  AVAILABLE ON TEAMS    FROM 5 PM - 9 AM PLEASE CALL ANSWERING SERVICE: [phone]    RENAL FOLLOW UP NOTE--Date of Service 05-03-24 @ 11:07  --------------------------------------------------------------------------------  HPI:      Pt seen and examined at bedside.       PAST HISTORY  --------------------------------------------------------------------------------  No significant changes to PMH, PSH, FHx, SHx, unless otherwise noted    ALLERGIES & MEDICATIONS  --------------------------------------------------------------------------------  Allergies    No Known Allergies    Intolerances      Standing Inpatient Medications  albuterol/ipratropium for Nebulization 3 milliLiter(s) Nebulizer every 6 hours  ascorbic acid 500 milliGRAM(s) Oral daily  aspirin enteric coated 81 milliGRAM(s) Oral daily  atorvastatin 40 milliGRAM(s) Oral at bedtime  carvedilol 6.25 milliGRAM(s) Oral every 12 hours  chlorhexidine 2% Cloths 1 Application(s) Topical daily  heparin   Injectable 5000 Unit(s) SubCutaneous every 12 hours  lactobacillus acidophilus 1 Tablet(s) Oral daily  lactulose Syrup 10 Gram(s) Oral daily  mupirocin 2% Ointment 1 Application(s) Both Nostrils two times a day  Nephro-hannah 1 Tablet(s) Oral daily  pantoprazole    Tablet 40 milliGRAM(s) Oral before breakfast  polyethylene glycol 3350 17 Gram(s) Oral daily  senna 2 Tablet(s) Oral at bedtime  sodium chloride 3%  Inhalation 4 milliLiter(s) Inhalation every 6 hours    PRN Inpatient Medications  acetaminophen     Tablet .. 650 milliGRAM(s) Oral every 6 hours PRN  aluminum hydroxide/magnesium hydroxide/simethicone Suspension 30 milliLiter(s) Oral every 4 hours PRN  melatonin 3 milliGRAM(s) Oral at bedtime PRN  ondansetron Injectable 4 milliGRAM(s) IV Push every 8 hours PRN  oxyCODONE    IR 5 milliGRAM(s) Oral every 6 hours PRN  sodium chloride 0.9% Bolus. 100 milliLiter(s) IV Bolus every 5 minutes PRN      REVIEW OF SYSTEMS  --------------------------------------------------------------------------------  General: no fever    MSK: no edema     VITALS/PHYSICAL EXAM  --------------------------------------------------------------------------------  T(C): 36.7 (05-03-24 @ 09:30), Max: 36.9 (05-02-24 @ 14:11)  HR: 76 (05-03-24 @ 10:15) (65 - 78)  BP: 105/64 (05-03-24 @ 09:30) (101/58 - 113/58)  RR: 18 (05-03-24 @ 09:30) (17 - 18)  SpO2: 98% (05-03-24 @ 10:15) (98% - 100%)  Wt(kg): --        05-02-24 @ 07:01 - 05-03-24 @ 07:00  --------------------------------------------------------  IN: 480 mL / OUT: 0 mL / NET: 480 mL      Physical Exam:  	Gen: NAD  	HEENT: MMM  	Pulm: CTA B/L  	CV: S1S2  	Abd: Soft, +BS  	Ext: No LE edema B/L                      Neuro: Awake   	Skin: Warm and Dry   	Vascular access: avf    LABS/STUDIES  --------------------------------------------------------------------------------              12.5   3.84  >-----------<  246      [05-02-24 @ 06:15]              39.3     137  |  95  |  17  ----------------------------<  58      [05-02-24 @ 06:15]  3.9   |  28  |  3.73        Ca     8.9     [05-02-24 @ 06:15]      Mg     3.40     [05-02-24 @ 06:15]      Phos  3.7     [05-02-24 @ 06:15]            Creatinine Trend:  SCr 3.73 [05-02 @ 06:15]  SCr 5.05 [05-01 @ 05:04]  SCr 3.93 [04-30 @ 06:16]  SCr 5.51 [04-29 @ 05:05]  SCr 4.18 [04-28 @ 05:00]    Urinalysis - [05-02-24 @ 06:15]      Color  / Appearance  / SG  / pH       Gluc 58 / Ketone   / Bili  / Urobili        Blood  / Protein  / Leuk Est  / Nitrite       RBC  / WBC  / Hyaline  / Gran  / Sq Epi  / Non Sq Epi  / Bacteria       PTH -- (Ca --)      [04-28-24 @ 05:00]   155  HbA1c 4.2      [07-19-19 @ 09:56]  TSH 1.790      [06-23-23 @ 11:51]  Lipid: chol 119, TG 66, HDL 46, LDL --      [10-07-23 @ 09:30]

## 2024-05-03 NOTE — PROGRESS NOTE ADULT - PROBLEM SELECTOR PLAN 1
Due to MRSA bacteremia  -unclear source of MRSA infection  -CT chest without obvious PNA, did mention a masslike consolidation in LLL most suggestive of atelectasis  -urine culture with no growth  -ID following, appreciate recs  -c/w vancomycin 1000mg after HD on HD days M/W/F  -ISRAEL w/o vegetation, but noted reduced global hypokinesis    -Cultures now NGTD

## 2024-05-03 NOTE — PROGRESS NOTE ADULT - PROBLEM SELECTOR PLAN 6
Need to verify Percocet - noted on HD [place]    Added oxy 5 mg q6h prn for mod to severe pain  DVT ppx: heparin bid   Diet: Passed dysphagia screen dysphagia screen, Renal DASH TLC easy to chew  Plan of care discussed with hayden Vincent [phone] on 5/2. All questions answered.

## 2024-05-03 NOTE — PROGRESS NOTE ADULT - SUBJECTIVE AND OBJECTIVE BOX
Patient is a 69y old  Male who presents with a chief complaint of nausea and vomiting (03 May 2024 11:07)      SUBJECTIVE / OVERNIGHT EVENTS:    No events overnight. This AM, CATRACHO PATRICK feels well. Has no n/v/d/cp/sob.      MEDICATIONS  (STANDING):  albuterol/ipratropium for Nebulization 3 milliLiter(s) Nebulizer every 6 hours  ascorbic acid 500 milliGRAM(s) Oral daily  aspirin enteric coated 81 milliGRAM(s) Oral daily  atorvastatin 40 milliGRAM(s) Oral at bedtime  carvedilol 6.25 milliGRAM(s) Oral every 12 hours  chlorhexidine 2% Cloths 1 Application(s) Topical daily  heparin   Injectable 5000 Unit(s) SubCutaneous every 12 hours  lactobacillus acidophilus 1 Tablet(s) Oral daily  lactulose Syrup 10 Gram(s) Oral daily  mupirocin 2% Ointment 1 Application(s) Both Nostrils two times a day  Nephro-hannah 1 Tablet(s) Oral daily  pantoprazole    Tablet 40 milliGRAM(s) Oral before breakfast  polyethylene glycol 3350 17 Gram(s) Oral daily  senna 2 Tablet(s) Oral at bedtime  sodium chloride 3%  Inhalation 4 milliLiter(s) Inhalation every 6 hours    MEDICATIONS  (PRN):  acetaminophen     Tablet .. 650 milliGRAM(s) Oral every 6 hours PRN Temp greater or equal to 38C (100.4F), Mild Pain (1 - 3)  aluminum hydroxide/magnesium hydroxide/simethicone Suspension 30 milliLiter(s) Oral every 4 hours PRN Dyspepsia  melatonin 3 milliGRAM(s) Oral at bedtime PRN Insomnia  ondansetron Injectable 4 milliGRAM(s) IV Push every 8 hours PRN Nausea and/or Vomiting  oxyCODONE    IR 5 milliGRAM(s) Oral every 6 hours PRN moderate to severe pain  sodium chloride 0.9% Bolus. 100 milliLiter(s) IV Bolus every 5 minutes PRN SBP LESS THAN or EQUAL to 80 mmHg      PHYSICAL EXAM:  T(C): 36.6 (05-03-24 @ 11:25), Max: 36.9 (05-02-24 @ 14:11)  HR: 70 (05-03-24 @ 11:25) (65 - 78)  BP: 129/68 (05-03-24 @ 11:25) (101/58 - 129/68)  RR: 17 (05-03-24 @ 11:25) (17 - 18)  SpO2: 98% (05-03-24 @ 10:15) (98% - 100%)  I&O's Summary    02 May 2024 07:01  -  03 May 2024 07:00  --------------------------------------------------------  IN: 480 mL / OUT: 0 mL / NET: 480 mL    03 May 2024 07:01  -  03 May 2024 13:48  --------------------------------------------------------  IN: 120 mL / OUT: 0 mL / NET: 120 mL      GENERAL: NAD, lying in bed  HEAD:  Atraumatic, Normocephalic, MMM  CHEST/LUNG: No use of accessory muscles, CTAB, breathing non-labored  COR: RR, no mrcg  ABD: Soft, ND/NT, +BS  PSYCH: AAOxself  NEUROLOGY: CN II-XII grossly intact, moving all extremities  SKIN: No rashes or lesions  EXT: wwp, s/p bilateral BKA    LABS:  CAPILLARY BLOOD GLUCOSE      POCT Blood Glucose.: 77 mg/dL (03 May 2024 12:38)  POCT Blood Glucose.: 114 mg/dL (03 May 2024 09:54)  POCT Blood Glucose.: 84 mg/dL (03 May 2024 07:39)  POCT Blood Glucose.: 75 mg/dL (02 May 2024 21:11)  POCT Blood Glucose.: 77 mg/dL (02 May 2024 16:36)  POCT Blood Glucose.: 86 mg/dL (02 May 2024 14:03)                          10.6   4.00  )-----------( 243      ( 03 May 2024 11:25 )             33.3     05-03    132<L>  |  94<L>  |  30<H>  ----------------------------<  90  5.5<H>   |  26  |  5.16<H>    Ca    8.1<L>      03 May 2024 11:25  Phos  4.9     05-03  Mg     3.50     05-03            Urinalysis Basic - ( 03 May 2024 11:25 )    Color: x / Appearance: x / SG: x / pH: x  Gluc: 90 mg/dL / Ketone: x  / Bili: x / Urobili: x   Blood: x / Protein: x / Nitrite: x   Leuk Esterase: x / RBC: x / WBC x   Sq Epi: x / Non Sq Epi: x / Bacteria: x          RADIOLOGY & ADDITIONAL TESTS:    Telemetry Personally Reviewed -     Imaging Personally Reviewed -     Imaging Reviewed -     Consultant(s) Notes Reviewed -       Care Discussed with Consultants/Other Providers -

## 2024-05-04 LAB
ANION GAP SERPL CALC-SCNC: 12 MMOL/L — SIGNIFICANT CHANGE UP (ref 7–14)
ANION GAP SERPL CALC-SCNC: 12 MMOL/L — SIGNIFICANT CHANGE UP (ref 7–14)
BUN SERPL-MCNC: 22 MG/DL — SIGNIFICANT CHANGE UP (ref 7–23)
BUN SERPL-MCNC: 23 MG/DL — SIGNIFICANT CHANGE UP (ref 7–23)
CALCIUM SERPL-MCNC: 8.2 MG/DL — LOW (ref 8.4–10.5)
CALCIUM SERPL-MCNC: 8.7 MG/DL — SIGNIFICANT CHANGE UP (ref 8.4–10.5)
CHLORIDE SERPL-SCNC: 88 MMOL/L — LOW (ref 98–107)
CHLORIDE SERPL-SCNC: 90 MMOL/L — LOW (ref 98–107)
CO2 SERPL-SCNC: 25 MMOL/L — SIGNIFICANT CHANGE UP (ref 22–31)
CO2 SERPL-SCNC: 27 MMOL/L — SIGNIFICANT CHANGE UP (ref 22–31)
CREAT SERPL-MCNC: 4.13 MG/DL — HIGH (ref 0.5–1.3)
CREAT SERPL-MCNC: 4.15 MG/DL — HIGH (ref 0.5–1.3)
CULTURE RESULTS: SIGNIFICANT CHANGE UP
CULTURE RESULTS: SIGNIFICANT CHANGE UP
EGFR: 15 ML/MIN/1.73M2 — LOW
EGFR: 15 ML/MIN/1.73M2 — LOW
GLUCOSE BLDC GLUCOMTR-MCNC: 104 MG/DL — HIGH (ref 70–99)
GLUCOSE BLDC GLUCOMTR-MCNC: 110 MG/DL — HIGH (ref 70–99)
GLUCOSE BLDC GLUCOMTR-MCNC: 73 MG/DL — SIGNIFICANT CHANGE UP (ref 70–99)
GLUCOSE BLDC GLUCOMTR-MCNC: 96 MG/DL — SIGNIFICANT CHANGE UP (ref 70–99)
GLUCOSE SERPL-MCNC: 149 MG/DL — HIGH (ref 70–99)
GLUCOSE SERPL-MCNC: 84 MG/DL — SIGNIFICANT CHANGE UP (ref 70–99)
HBV CORE AB SER-ACNC: SIGNIFICANT CHANGE UP
HBV SURFACE AB SER-ACNC: 204.3 MIU/ML — SIGNIFICANT CHANGE UP
HBV SURFACE AG SER-ACNC: SIGNIFICANT CHANGE UP
HCT VFR BLD CALC: 34.8 % — LOW (ref 39–50)
HGB BLD-MCNC: 10.9 G/DL — LOW (ref 13–17)
MAGNESIUM SERPL-MCNC: 2.8 MG/DL — HIGH (ref 1.6–2.6)
MAGNESIUM SERPL-MCNC: 3 MG/DL — HIGH (ref 1.6–2.6)
MCHC RBC-ENTMCNC: 27.1 PG — SIGNIFICANT CHANGE UP (ref 27–34)
MCHC RBC-ENTMCNC: 31.3 GM/DL — LOW (ref 32–36)
MCV RBC AUTO: 86.6 FL — SIGNIFICANT CHANGE UP (ref 80–100)
NRBC # BLD: 0 /100 WBCS — SIGNIFICANT CHANGE UP (ref 0–0)
NRBC # FLD: 0 K/UL — SIGNIFICANT CHANGE UP (ref 0–0)
PHOSPHATE SERPL-MCNC: 3.3 MG/DL — SIGNIFICANT CHANGE UP (ref 2.5–4.5)
PHOSPHATE SERPL-MCNC: 3.6 MG/DL — SIGNIFICANT CHANGE UP (ref 2.5–4.5)
PLATELET # BLD AUTO: 214 K/UL — SIGNIFICANT CHANGE UP (ref 150–400)
POTASSIUM SERPL-MCNC: 5.7 MMOL/L — HIGH (ref 3.5–5.3)
POTASSIUM SERPL-MCNC: 6.2 MMOL/L — CRITICAL HIGH (ref 3.5–5.3)
POTASSIUM SERPL-SCNC: 5.7 MMOL/L — HIGH (ref 3.5–5.3)
POTASSIUM SERPL-SCNC: 6.2 MMOL/L — CRITICAL HIGH (ref 3.5–5.3)
RBC # BLD: 4.02 M/UL — LOW (ref 4.2–5.8)
RBC # FLD: 19.6 % — HIGH (ref 10.3–14.5)
SODIUM SERPL-SCNC: 127 MMOL/L — LOW (ref 135–145)
SODIUM SERPL-SCNC: 127 MMOL/L — LOW (ref 135–145)
SPECIMEN SOURCE: SIGNIFICANT CHANGE UP
SPECIMEN SOURCE: SIGNIFICANT CHANGE UP
VANCOMYCIN FLD-MCNC: 22.2 UG/ML — SIGNIFICANT CHANGE UP
WBC # BLD: 4.39 K/UL — SIGNIFICANT CHANGE UP (ref 3.8–10.5)
WBC # FLD AUTO: 4.39 K/UL — SIGNIFICANT CHANGE UP (ref 3.8–10.5)

## 2024-05-04 PROCEDURE — 76937 US GUIDE VASCULAR ACCESS: CPT | Mod: 26,AS

## 2024-05-04 PROCEDURE — 99232 SBSQ HOSP IP/OBS MODERATE 35: CPT

## 2024-05-04 PROCEDURE — 93010 ELECTROCARDIOGRAM REPORT: CPT

## 2024-05-04 PROCEDURE — 36556 INSERT NON-TUNNEL CV CATH: CPT

## 2024-05-04 RX ORDER — SODIUM ZIRCONIUM CYCLOSILICATE 10 G/10G
10 POWDER, FOR SUSPENSION ORAL ONCE
Refills: 0 | Status: COMPLETED | OUTPATIENT
Start: 2024-05-04 | End: 2024-05-04

## 2024-05-04 RX ORDER — SODIUM CHLORIDE 9 MG/ML
1000 INJECTION, SOLUTION INTRAVENOUS
Refills: 0 | Status: DISCONTINUED | OUTPATIENT
Start: 2024-05-04 | End: 2024-05-04

## 2024-05-04 RX ADMIN — SODIUM ZIRCONIUM CYCLOSILICATE 10 GRAM(S): 10 POWDER, FOR SUSPENSION ORAL at 17:30

## 2024-05-04 RX ADMIN — SODIUM CHLORIDE 4 MILLILITER(S): 9 INJECTION INTRAMUSCULAR; INTRAVENOUS; SUBCUTANEOUS at 14:21

## 2024-05-04 RX ADMIN — CARVEDILOL PHOSPHATE 6.25 MILLIGRAM(S): 80 CAPSULE, EXTENDED RELEASE ORAL at 17:07

## 2024-05-04 RX ADMIN — CHLORHEXIDINE GLUCONATE 1 APPLICATION(S): 213 SOLUTION TOPICAL at 11:14

## 2024-05-04 RX ADMIN — MUPIROCIN 1 APPLICATION(S): 20 OINTMENT TOPICAL at 06:11

## 2024-05-04 RX ADMIN — PANTOPRAZOLE SODIUM 40 MILLIGRAM(S): 20 TABLET, DELAYED RELEASE ORAL at 06:12

## 2024-05-04 RX ADMIN — Medication 3 MILLILITER(S): at 08:36

## 2024-05-04 RX ADMIN — Medication 3 MILLILITER(S): at 20:14

## 2024-05-04 RX ADMIN — SODIUM CHLORIDE 4 MILLILITER(S): 9 INJECTION INTRAMUSCULAR; INTRAVENOUS; SUBCUTANEOUS at 03:56

## 2024-05-04 RX ADMIN — Medication 1 TABLET(S): at 11:13

## 2024-05-04 RX ADMIN — SENNA PLUS 2 TABLET(S): 8.6 TABLET ORAL at 21:51

## 2024-05-04 RX ADMIN — Medication 81 MILLIGRAM(S): at 11:12

## 2024-05-04 RX ADMIN — SODIUM CHLORIDE 4 MILLILITER(S): 9 INJECTION INTRAMUSCULAR; INTRAVENOUS; SUBCUTANEOUS at 08:36

## 2024-05-04 RX ADMIN — CARVEDILOL PHOSPHATE 6.25 MILLIGRAM(S): 80 CAPSULE, EXTENDED RELEASE ORAL at 06:11

## 2024-05-04 RX ADMIN — HEPARIN SODIUM 5000 UNIT(S): 5000 INJECTION INTRAVENOUS; SUBCUTANEOUS at 06:11

## 2024-05-04 RX ADMIN — LACTULOSE 10 GRAM(S): 10 SOLUTION ORAL at 11:13

## 2024-05-04 RX ADMIN — Medication 3 MILLILITER(S): at 03:56

## 2024-05-04 RX ADMIN — HEPARIN SODIUM 5000 UNIT(S): 5000 INJECTION INTRAVENOUS; SUBCUTANEOUS at 17:08

## 2024-05-04 RX ADMIN — Medication 3 MILLILITER(S): at 14:20

## 2024-05-04 RX ADMIN — SODIUM CHLORIDE 50 MILLILITER(S): 9 INJECTION, SOLUTION INTRAVENOUS at 08:11

## 2024-05-04 RX ADMIN — SODIUM CHLORIDE 4 MILLILITER(S): 9 INJECTION INTRAMUSCULAR; INTRAVENOUS; SUBCUTANEOUS at 20:14

## 2024-05-04 RX ADMIN — POLYETHYLENE GLYCOL 3350 17 GRAM(S): 17 POWDER, FOR SOLUTION ORAL at 11:12

## 2024-05-04 RX ADMIN — ATORVASTATIN CALCIUM 40 MILLIGRAM(S): 80 TABLET, FILM COATED ORAL at 21:51

## 2024-05-04 RX ADMIN — Medication 500 MILLIGRAM(S): at 11:12

## 2024-05-04 NOTE — SWALLOW BEDSIDE ASSESSMENT ADULT - NS SPL SWALLOW CLINIC TRIAL FT
refused to take additional sips despite maximal prompting. Pt. closed lips tightly and refused to accept straw/tsp.

## 2024-05-04 NOTE — PROGRESS NOTE ADULT - ASSESSMENT
69-year-old male with PMH end-stage renal on HD via left aVF, CVA, BKA/AKA, functionally quadriplegic, CAD, HTN, HLD, history of O2 as needed presents for evaluation of vomiting.  Brought from HD, started vomiting while receiving treatment.  Patient unable to endorse any specific complaints, per paperwork AO x 1/0 at baseline.  Patient denies chest pain or difficulty breathing, unable to really clarify about abdominal pain. Nephrology consulted for dialysis needs.    A/P   ESRD  Center: [place]  Nephrologist: Dr. Navarro   Access: L AVF  MWF schedule.  Consent obtained from niece, and proxy, Ramonita Vincent via phone [phone]  Renal diet  Monitor BMP - no labs today.    HTN   BP fluctuating but controlled.  Low salt diet.  C/W current anti- hypertensives.  UF with HD   Monitor BP     Anemia  At goal.  TAINA w/ HD; hold for Hgb >11.  Monitor Hb     CKD-MBD   - acceptable   Monitor Ca, PO4 daily     Nausea/Vomiting  CT A/P shows constipation and wall thickening, worrisome for stercoral colitis  s/p disimpaction.  Colorectal surgery f/u

## 2024-05-04 NOTE — PROGRESS NOTE ADULT - PROBLEM SELECTOR PLAN 1
Due to MRSA bacteremia  -unclear source of MRSA infection  -CT chest without obvious PNA, did mention a masslike consolidation in LLL most suggestive of atelectasis  -urine culture with no growth  -ID following, appreciate recs  -c/w vancomycin 1000mg after HD on HD days M/W/F; will need through 5/27  -ISRAEL w/o vegetation, but noted reduced global hypokinesis    -Cultures NGTD

## 2024-05-04 NOTE — CHART NOTE - NSCHARTNOTEFT_GEN_A_CORE
Abnormal lab value 6.2 Potassium. Lab moderately hemolyzed. EKG performed w/o peaked T waves, NSR. 10 g of Lokelma given. Patient seen at bedside, resting comfortably w/o acute complaints. No chest pain, sob, lightheadedness of discomfort. Repeat BMP drawn and sent to lab pending results. Patient monitored on telemetry.    Vital Signs Last 24 Hrs  T(C): 36.6 (04 May 2024 17:06), Max: 37.1 (04 May 2024 12:40)  T(F): 97.8 (04 May 2024 17:06), Max: 98.8 (04 May 2024 12:40)  HR: 71 (04 May 2024 17:06) (63 - 78)  BP: 134/52 (04 May 2024 17:06) (122/68 - 134/67)  BP(mean): --  RR: 18 (04 May 2024 17:06) (18 - 18)  SpO2: 98% (04 May 2024 17:06) (98% - 100%)    Parameters below as of 04 May 2024 17:06  Patient On (Oxygen Delivery Method): nasal cannula  O2 Flow (L/min): 2    Kenan De Dios PA-C,   Internal Medicine ACP   In house pager #[phone]

## 2024-05-04 NOTE — SWALLOW BEDSIDE ASSESSMENT ADULT - ADDITIONAL RECOMMENDATIONS
1). This service to f/u as scheduling permits to assess diet tolerance  2). Medical team to reconsult if concern for diet tolerance and/or change in medical status occurs

## 2024-05-04 NOTE — PROGRESS NOTE ADULT - SUBJECTIVE AND OBJECTIVE BOX
Patient is a 69y old  Male who presents with a chief complaint of nausea and vomiting (03 May 2024 13:32)      SUBJECTIVE / OVERNIGHT EVENTS:    No events overnight. This AM, CATRACHO PATRICK feels well. Has no n/v/d/cp/sob.      MEDICATIONS  (STANDING):  albuterol/ipratropium for Nebulization 3 milliLiter(s) Nebulizer every 6 hours  ascorbic acid 500 milliGRAM(s) Oral daily  aspirin enteric coated 81 milliGRAM(s) Oral daily  atorvastatin 40 milliGRAM(s) Oral at bedtime  carvedilol 6.25 milliGRAM(s) Oral every 12 hours  chlorhexidine 2% Cloths 1 Application(s) Topical daily  dextrose 5% + sodium chloride 0.45%. 1000 milliLiter(s) (50 mL/Hr) IV Continuous <Continuous>  heparin   Injectable 5000 Unit(s) SubCutaneous every 12 hours  lactobacillus acidophilus 1 Tablet(s) Oral daily  lactulose Syrup 10 Gram(s) Oral daily  Nephro-hannah 1 Tablet(s) Oral daily  pantoprazole    Tablet 40 milliGRAM(s) Oral before breakfast  polyethylene glycol 3350 17 Gram(s) Oral daily  senna 2 Tablet(s) Oral at bedtime  sodium chloride 3%  Inhalation 4 milliLiter(s) Inhalation every 6 hours    MEDICATIONS  (PRN):  acetaminophen     Tablet .. 650 milliGRAM(s) Oral every 6 hours PRN Temp greater or equal to 38C (100.4F), Mild Pain (1 - 3)  aluminum hydroxide/magnesium hydroxide/simethicone Suspension 30 milliLiter(s) Oral every 4 hours PRN Dyspepsia  melatonin 3 milliGRAM(s) Oral at bedtime PRN Insomnia  ondansetron Injectable 4 milliGRAM(s) IV Push every 8 hours PRN Nausea and/or Vomiting  oxyCODONE    IR 5 milliGRAM(s) Oral every 6 hours PRN moderate to severe pain  sodium chloride 0.9% Bolus. 100 milliLiter(s) IV Bolus every 5 minutes PRN SBP LESS THAN or EQUAL to 80 mmHg      PHYSICAL EXAM:  T(C): 36.6 (05-04-24 @ 06:00), Max: 36.6 (05-03-24 @ 11:25)  HR: 76 (05-04-24 @ 08:39) (65 - 78)  BP: 134/67 (05-04-24 @ 06:00) (115/65 - 134/67)  RR: 18 (05-04-24 @ 06:00) (17 - 18)  SpO2: 98% (05-04-24 @ 08:39) (98% - 100%)  I&O's Summary    03 May 2024 07:01  -  04 May 2024 07:00  --------------------------------------------------------  IN: 760 mL / OUT: 1637 mL / NET: -877 mL      GENERAL: NAD, lying in bed  HEAD:  Atraumatic, Normocephalic, MMM  CHEST/LUNG: No use of accessory muscles, CTAB, breathing non-labored  COR: RR, no mrcg  ABD: Soft, ND/NT, +BS  PSYCH: AAOxself  NEUROLOGY: CN II-XII grossly intact, moving all extremities  SKIN: No rashes or lesions  EXT: wwp, s/p bilateral BKA    LABS:  CAPILLARY BLOOD GLUCOSE      POCT Blood Glucose.: 73 mg/dL (04 May 2024 07:45)  POCT Blood Glucose.: 125 mg/dL (03 May 2024 21:19)  POCT Blood Glucose.: 97 mg/dL (03 May 2024 16:40)  POCT Blood Glucose.: 83 mg/dL (03 May 2024 14:18)  POCT Blood Glucose.: 77 mg/dL (03 May 2024 12:38)                          10.6   4.00  )-----------( 243      ( 03 May 2024 11:25 )             33.3     05-03    135  |  97<L>  |  13  ----------------------------<  113<H>  4.6   |  28  |  3.04<H>    Ca    8.2<L>      03 May 2024 17:28  Phos  3.2     05-03  Mg     2.70     05-03            Urinalysis Basic - ( 03 May 2024 17:28 )    Color: x / Appearance: x / SG: x / pH: x  Gluc: 113 mg/dL / Ketone: x  / Bili: x / Urobili: x   Blood: x / Protein: x / Nitrite: x   Leuk Esterase: x / RBC: x / WBC x   Sq Epi: x / Non Sq Epi: x / Bacteria: x          RADIOLOGY & ADDITIONAL TESTS:    Telemetry Personally Reviewed -     Imaging Personally Reviewed -     Imaging Reviewed -     Consultant(s) Notes Reviewed -       Care Discussed with Consultants/Other Providers -

## 2024-05-04 NOTE — PROGRESS NOTE ADULT - PROBLEM SELECTOR PLAN 6
Need to verify Percocet - noted on HD paperwork but [place]    Added oxy 5 mg q6h prn for mod to severe pain  DVT ppx: heparin bid   Diet: Passed dysphagia screen dysphagia screen, Renal DASH TLC easy to chew  Plan of care discussed with hayden Vincent [phone] on 5/2. All questions answered.

## 2024-05-04 NOTE — SWALLOW BEDSIDE ASSESSMENT ADULT - SWALLOW EVAL: DIAGNOSIS
Limited assessment of oral-pharyngeal stages given acceptance of minimal trials and refusal of additional despite maximal encouragement.

## 2024-05-04 NOTE — PROGRESS NOTE ADULT - SUBJECTIVE AND OBJECTIVE BOX
[facility]   MD MARIBELL CARRION MD ANGELA WONG, PA QIAN CHEN, NP    TEL:  OFFICE: [phone]  From 5pm-7am Answering Service [phone]    -- RENAL FOLLOW UP NOTE ---Date of Service 05-04-24 @ 16:59    Patient is a 69y old  Male who presents with a chief complaint of nausea and vomiting.\  Patient seen and examined at bedside. No chest pain/SOB.    VITALS:  T(F): 98.8 (05-04-24 @ 12:40), Max: 98.8 (05-04-24 @ 12:40)  HR: 63 (05-04-24 @ 14:20)  BP: 122/68 (05-04-24 @ 12:40)  RR: 18 (05-04-24 @ 12:40)  SpO2: 100% (05-04-24 @ 14:20)  Wt(kg): --    05-03 @ 07:01  -  05-04 @ 07:00  --------------------------------------------------------  IN: 760 mL / OUT: 1637 mL / NET: -877 mL    05-04 @ 07:01  -  05-04 @ 16:59  --------------------------------------------------------  IN: 330 mL / OUT: 0 mL / NET: 330 mL      PHYSICAL EXAM:  General: NAD  Neck: No JVD  Respiratory: CTAB, no wheezes, rales or rhonchi  Cardiovascular: S1, S2, RRR  Gastrointestinal: BS+, soft, NT/ND  Extremities: No peripheral edema    Hospital Medications:   MEDICATIONS  (STANDING):  albuterol/ipratropium for Nebulization 3 milliLiter(s) Nebulizer every 6 hours  ascorbic acid 500 milliGRAM(s) Oral daily  aspirin enteric coated 81 milliGRAM(s) Oral daily  atorvastatin 40 milliGRAM(s) Oral at bedtime  carvedilol 6.25 milliGRAM(s) Oral every 12 hours  chlorhexidine 2% Cloths 1 Application(s) Topical daily  dextrose 5% + sodium chloride 0.45%. 1000 milliLiter(s) (50 mL/Hr) IV Continuous <Continuous>  heparin   Injectable 5000 Unit(s) SubCutaneous every 12 hours  lactobacillus acidophilus 1 Tablet(s) Oral daily  lactulose Syrup 10 Gram(s) Oral daily  Nephro-hannah 1 Tablet(s) Oral daily  pantoprazole    Tablet 40 milliGRAM(s) Oral before breakfast  polyethylene glycol 3350 17 Gram(s) Oral daily  senna 2 Tablet(s) Oral at bedtime  sodium chloride 3%  Inhalation 4 milliLiter(s) Inhalation every 6 hours      LABS:  05-03    135  |  97<L>  |  13  ----------------------------<  113<H>  4.6   |  28  |  3.04<H>    Ca    8.2<L>      03 May 2024 17:28  Phos  3.2     05-03  Mg     2.70     05-03      Creatinine Trend: 3.04 <--, 5.16 <--, 3.73 <--, 5.05 <--, 3.93 <--, 5.51 <--, 4.18 <--    Phosphorus: 3.2 mg/dL (05-03 @ 17:28)                          10.9   4.39  )-----------( 214      ( 04 May 2024 16:30 )             34.8     Urine Studies:  Urinalysis - [05-03-24 @ 17:28]      Color  / Appearance  / SG  / pH       Gluc 113 / Ketone   / Bili  / Urobili        Blood  / Protein  / Leuk Est  / Nitrite       RBC  / WBC  / Hyaline  / Gran  / Sq Epi  / Non Sq Epi  / Bacteria       PTH -- (Ca --)      [04-28-24 @ 05:00]   155  HbA1c 4.2      [07-19-19 @ 09:56]  TSH 1.790      [06-23-23 @ 11:51]  Lipid: chol 119, TG 66, HDL 46, LDL --      [10-07-23 @ 09:30]    HBsAb 204.3      [05-03-24 @ 11:25]  HBsAg Nonreact      [05-03-24 @ 11:25]  HBcAb Nonreact      [05-03-24 @ 11:25]

## 2024-05-04 NOTE — SWALLOW BEDSIDE ASSESSMENT ADULT - COMMENTS
Pt. reclined in bed in NAD upon SLP arrival. Pt. easily woke from sleep with verbal/tactile cues.     Per chart:   "69 year old M with PMH ESRD on HD via left AVF MWF, CVA, BKA/AKA, functionally quadriplegic, CAD, HTN, HLD, history of O2 as needed presents for evaluation of vomiting.  Pt admitted for sepsis 2/2 colitis. Course c/b MRSA bacteremia."    -WBC WNL per lab review   -CXR: "Cardiomegaly with trace left-sided pleural effusion and mild pulmonary vascular congestion"  -CT CHEST: "Oval-shaped masslike consolidation in the left lower lobe adjacent to the pleura with mild bronchovascular bundle distortion, suggestive of rounded atelectasis. Small bilateral pleural effusions with associated passive atelectasis."    Pt. known to service from prior admissions, most recently (8/2023) - see chart for full report and recommendations.     Pt. denied any difficulty swallowing, however; pt. with minimal responses to SLP prompting. RN staff reported no difficulty with Puree/Thin. They also endorsed pt. a "picky eater", eating very few items and refusing the rest.

## 2024-05-05 LAB
ANION GAP SERPL CALC-SCNC: 13 MMOL/L — SIGNIFICANT CHANGE UP (ref 7–14)
ANION GAP SERPL CALC-SCNC: 13 MMOL/L — SIGNIFICANT CHANGE UP (ref 7–14)
ANION GAP SERPL CALC-SCNC: 16 MMOL/L — HIGH (ref 7–14)
BUN SERPL-MCNC: 11 MG/DL — SIGNIFICANT CHANGE UP (ref 7–23)
BUN SERPL-MCNC: 27 MG/DL — HIGH (ref 7–23)
BUN SERPL-MCNC: 29 MG/DL — HIGH (ref 7–23)
CALCIUM SERPL-MCNC: 8.3 MG/DL — LOW (ref 8.4–10.5)
CALCIUM SERPL-MCNC: 8.9 MG/DL — SIGNIFICANT CHANGE UP (ref 8.4–10.5)
CALCIUM SERPL-MCNC: 9.3 MG/DL — SIGNIFICANT CHANGE UP (ref 8.4–10.5)
CHLORIDE SERPL-SCNC: 88 MMOL/L — LOW (ref 98–107)
CHLORIDE SERPL-SCNC: 90 MMOL/L — LOW (ref 98–107)
CHLORIDE SERPL-SCNC: 97 MMOL/L — LOW (ref 98–107)
CO2 SERPL-SCNC: 21 MMOL/L — LOW (ref 22–31)
CO2 SERPL-SCNC: 21 MMOL/L — LOW (ref 22–31)
CO2 SERPL-SCNC: 28 MMOL/L — SIGNIFICANT CHANGE UP (ref 22–31)
CREAT SERPL-MCNC: 2.7 MG/DL — HIGH (ref 0.5–1.3)
CREAT SERPL-MCNC: 4.43 MG/DL — HIGH (ref 0.5–1.3)
CREAT SERPL-MCNC: 4.67 MG/DL — HIGH (ref 0.5–1.3)
EGFR: 13 ML/MIN/1.73M2 — LOW
EGFR: 14 ML/MIN/1.73M2 — LOW
EGFR: 25 ML/MIN/1.73M2 — LOW
GLUCOSE BLDC GLUCOMTR-MCNC: 102 MG/DL — HIGH (ref 70–99)
GLUCOSE BLDC GLUCOMTR-MCNC: 107 MG/DL — HIGH (ref 70–99)
GLUCOSE BLDC GLUCOMTR-MCNC: 232 MG/DL — HIGH (ref 70–99)
GLUCOSE BLDC GLUCOMTR-MCNC: 71 MG/DL — SIGNIFICANT CHANGE UP (ref 70–99)
GLUCOSE BLDC GLUCOMTR-MCNC: 89 MG/DL — SIGNIFICANT CHANGE UP (ref 70–99)
GLUCOSE BLDC GLUCOMTR-MCNC: 97 MG/DL — SIGNIFICANT CHANGE UP (ref 70–99)
GLUCOSE SERPL-MCNC: 88 MG/DL — SIGNIFICANT CHANGE UP (ref 70–99)
GLUCOSE SERPL-MCNC: 94 MG/DL — SIGNIFICANT CHANGE UP (ref 70–99)
GLUCOSE SERPL-MCNC: 94 MG/DL — SIGNIFICANT CHANGE UP (ref 70–99)
HAV IGM SER-ACNC: SIGNIFICANT CHANGE UP
HBV CORE IGM SER-ACNC: SIGNIFICANT CHANGE UP
HBV SURFACE AG SER-ACNC: SIGNIFICANT CHANGE UP
HCT VFR BLD CALC: 45.1 % — SIGNIFICANT CHANGE UP (ref 39–50)
HCV AB S/CO SERPL IA: 0.12 S/CO — SIGNIFICANT CHANGE UP (ref 0–0.99)
HCV AB SERPL-IMP: SIGNIFICANT CHANGE UP
HGB BLD-MCNC: 13.9 G/DL — SIGNIFICANT CHANGE UP (ref 13–17)
MAGNESIUM SERPL-MCNC: 2.7 MG/DL — HIGH (ref 1.6–2.6)
MAGNESIUM SERPL-MCNC: 3 MG/DL — HIGH (ref 1.6–2.6)
MAGNESIUM SERPL-MCNC: 3.2 MG/DL — HIGH (ref 1.6–2.6)
MCHC RBC-ENTMCNC: 26.7 PG — LOW (ref 27–34)
MCHC RBC-ENTMCNC: 30.8 GM/DL — LOW (ref 32–36)
MCV RBC AUTO: 86.7 FL — SIGNIFICANT CHANGE UP (ref 80–100)
NRBC # BLD: 0 /100 WBCS — SIGNIFICANT CHANGE UP (ref 0–0)
NRBC # FLD: 0 K/UL — SIGNIFICANT CHANGE UP (ref 0–0)
PHOSPHATE SERPL-MCNC: 2.8 MG/DL — SIGNIFICANT CHANGE UP (ref 2.5–4.5)
PHOSPHATE SERPL-MCNC: 3.5 MG/DL — SIGNIFICANT CHANGE UP (ref 2.5–4.5)
PHOSPHATE SERPL-MCNC: 3.6 MG/DL — SIGNIFICANT CHANGE UP (ref 2.5–4.5)
PLATELET # BLD AUTO: 198 K/UL — SIGNIFICANT CHANGE UP (ref 150–400)
POTASSIUM SERPL-MCNC: 4.7 MMOL/L — SIGNIFICANT CHANGE UP (ref 3.5–5.3)
POTASSIUM SERPL-MCNC: 5.7 MMOL/L — HIGH (ref 3.5–5.3)
POTASSIUM SERPL-MCNC: 6.5 MMOL/L — CRITICAL HIGH (ref 3.5–5.3)
POTASSIUM SERPL-SCNC: 4.7 MMOL/L — SIGNIFICANT CHANGE UP (ref 3.5–5.3)
POTASSIUM SERPL-SCNC: 5.7 MMOL/L — HIGH (ref 3.5–5.3)
POTASSIUM SERPL-SCNC: 6.5 MMOL/L — CRITICAL HIGH (ref 3.5–5.3)
RBC # BLD: 5.2 M/UL — SIGNIFICANT CHANGE UP (ref 4.2–5.8)
RBC # FLD: 19.7 % — HIGH (ref 10.3–14.5)
SODIUM SERPL-SCNC: 124 MMOL/L — LOW (ref 135–145)
SODIUM SERPL-SCNC: 125 MMOL/L — LOW (ref 135–145)
SODIUM SERPL-SCNC: 138 MMOL/L — SIGNIFICANT CHANGE UP (ref 135–145)
VANCOMYCIN FLD-MCNC: 23.9 UG/ML — SIGNIFICANT CHANGE UP
WBC # BLD: 4.52 K/UL — SIGNIFICANT CHANGE UP (ref 3.8–10.5)
WBC # FLD AUTO: 4.52 K/UL — SIGNIFICANT CHANGE UP (ref 3.8–10.5)

## 2024-05-05 PROCEDURE — 93010 ELECTROCARDIOGRAM REPORT: CPT

## 2024-05-05 PROCEDURE — 99232 SBSQ HOSP IP/OBS MODERATE 35: CPT

## 2024-05-05 RX ORDER — SODIUM ZIRCONIUM CYCLOSILICATE 10 G/10G
10 POWDER, FOR SUSPENSION ORAL ONCE
Refills: 0 | Status: COMPLETED | OUTPATIENT
Start: 2024-05-05 | End: 2024-05-05

## 2024-05-05 RX ORDER — INSULIN HUMAN 100 [IU]/ML
5 INJECTION, SOLUTION SUBCUTANEOUS ONCE
Refills: 0 | Status: COMPLETED | OUTPATIENT
Start: 2024-05-05 | End: 2024-05-05

## 2024-05-05 RX ORDER — ALBUTEROL 90 UG/1
10 AEROSOL, METERED ORAL ONCE
Refills: 0 | Status: COMPLETED | OUTPATIENT
Start: 2024-05-05 | End: 2024-05-05

## 2024-05-05 RX ORDER — DEXTROSE 50 % IN WATER 50 %
50 SYRINGE (ML) INTRAVENOUS ONCE
Refills: 0 | Status: COMPLETED | OUTPATIENT
Start: 2024-05-05 | End: 2024-05-05

## 2024-05-05 RX ORDER — CALCIUM GLUCONATE 100 MG/ML
2 VIAL (ML) INTRAVENOUS ONCE
Refills: 0 | Status: COMPLETED | OUTPATIENT
Start: 2024-05-05 | End: 2024-05-05

## 2024-05-05 RX ADMIN — ATORVASTATIN CALCIUM 40 MILLIGRAM(S): 80 TABLET, FILM COATED ORAL at 21:53

## 2024-05-05 RX ADMIN — INSULIN HUMAN 5 UNIT(S): 100 INJECTION, SOLUTION SUBCUTANEOUS at 02:13

## 2024-05-05 RX ADMIN — Medication 1 TABLET(S): at 12:24

## 2024-05-05 RX ADMIN — PANTOPRAZOLE SODIUM 40 MILLIGRAM(S): 20 TABLET, DELAYED RELEASE ORAL at 06:37

## 2024-05-05 RX ADMIN — Medication 500 MILLIGRAM(S): at 12:24

## 2024-05-05 RX ADMIN — SODIUM CHLORIDE 4 MILLILITER(S): 9 INJECTION INTRAMUSCULAR; INTRAVENOUS; SUBCUTANEOUS at 15:12

## 2024-05-05 RX ADMIN — SODIUM CHLORIDE 4 MILLILITER(S): 9 INJECTION INTRAMUSCULAR; INTRAVENOUS; SUBCUTANEOUS at 22:53

## 2024-05-05 RX ADMIN — CARVEDILOL PHOSPHATE 6.25 MILLIGRAM(S): 80 CAPSULE, EXTENDED RELEASE ORAL at 17:11

## 2024-05-05 RX ADMIN — Medication 3 MILLILITER(S): at 22:53

## 2024-05-05 RX ADMIN — Medication 650 MILLIGRAM(S): at 12:24

## 2024-05-05 RX ADMIN — Medication 3 MILLILITER(S): at 08:20

## 2024-05-05 RX ADMIN — SODIUM ZIRCONIUM CYCLOSILICATE 10 GRAM(S): 10 POWDER, FOR SUSPENSION ORAL at 15:42

## 2024-05-05 RX ADMIN — Medication 81 MILLIGRAM(S): at 12:24

## 2024-05-05 RX ADMIN — Medication 200 GRAM(S): at 02:09

## 2024-05-05 RX ADMIN — ALBUTEROL 10 MILLIGRAM(S): 90 AEROSOL, METERED ORAL at 02:50

## 2024-05-05 RX ADMIN — CARVEDILOL PHOSPHATE 6.25 MILLIGRAM(S): 80 CAPSULE, EXTENDED RELEASE ORAL at 06:35

## 2024-05-05 RX ADMIN — SODIUM CHLORIDE 4 MILLILITER(S): 9 INJECTION INTRAMUSCULAR; INTRAVENOUS; SUBCUTANEOUS at 08:20

## 2024-05-05 RX ADMIN — HEPARIN SODIUM 5000 UNIT(S): 5000 INJECTION INTRAVENOUS; SUBCUTANEOUS at 06:35

## 2024-05-05 RX ADMIN — LACTULOSE 10 GRAM(S): 10 SOLUTION ORAL at 12:24

## 2024-05-05 RX ADMIN — SENNA PLUS 2 TABLET(S): 8.6 TABLET ORAL at 21:53

## 2024-05-05 RX ADMIN — Medication 50 MILLILITER(S): at 02:11

## 2024-05-05 RX ADMIN — Medication 3 MILLILITER(S): at 02:50

## 2024-05-05 RX ADMIN — CHLORHEXIDINE GLUCONATE 1 APPLICATION(S): 213 SOLUTION TOPICAL at 12:28

## 2024-05-05 RX ADMIN — Medication 650 MILLIGRAM(S): at 13:00

## 2024-05-05 RX ADMIN — POLYETHYLENE GLYCOL 3350 17 GRAM(S): 17 POWDER, FOR SOLUTION ORAL at 12:29

## 2024-05-05 RX ADMIN — Medication 3 MILLILITER(S): at 15:12

## 2024-05-05 RX ADMIN — HEPARIN SODIUM 5000 UNIT(S): 5000 INJECTION INTRAVENOUS; SUBCUTANEOUS at 17:12

## 2024-05-05 NOTE — PROGRESS NOTE ADULT - ASSESSMENT
69-year-old male with PMH end-stage renal on HD via left aVF, CVA, BKA/AKA, functionally quadriplegic, CAD, HTN, HLD, history of O2 as needed presents for evaluation of vomiting.  Brought from HD, started vomiting while receiving treatment.  Patient unable to endorse any specific complaints, per paperwork AO x 1/0 at baseline.  Patient denies chest pain or difficulty breathing, unable to really clarify about abdominal pain. Nephrology consulted for dialysis needs.    A/P   ESRD  Center: [place]  Nephrologist: Dr. Navarro   Access: L AVF  MWF schedule.  Consent obtained from niece, and proxy, Ramonita Vincent via phone [phone]  HD today due high K+   Renal diet  Monitor BMP     HTN   BP fluctuating but controlled.  Low salt diet.  C/W current anti- hypertensives.  UF with HD   Monitor BP     Anemia  above goal.  TAINA w/ HD; hold for Hgb >11.  Monitor Hb     Hyperkalemia   HD today  low K diet   monitor     Hyponatremia   HD today  monitor     Acidosis   non AG + AG  HD today  monitor     CKD-MBD   - acceptable   p04 WNL  Monitor Ca, PO4 daily     Nausea/Vomiting  CT A/P shows constipation and wall thickening, worrisome for stercoral colitis  s/p disimpaction.  Colorectal surgery f/u 69-year-old male with PMH end-stage renal on HD via left aVF, CVA, BKA/AKA, functionally quadriplegic, CAD, HTN, HLD, history of O2 as needed presents for evaluation of vomiting.  Brought from HD, started vomiting while receiving treatment.  Patient unable to endorse any specific complaints, per paperwork AO x 1/0 at baseline.  Patient denies chest pain or difficulty breathing, unable to really clarify about abdominal pain. Nephrology consulted for dialysis needs.    A/P   ESRD  Center: [place]  Nephrologist: Dr. Navarro   Access: L AVF  MWF schedule.  Consent obtained from niece, and proxy, Ramonita Vincent via phone [phone]  HD today due high K+ . On CALL HD RN notified  Renal diet  Monitor BMP     HTN   BP fluctuating but controlled.  Low salt diet.  C/W current anti- hypertensives.  UF with HD   Monitor BP     Anemia  above goal.  TAINA w/ HD; hold for Hgb >11.  Monitor Hb     Hyperkalemia   HD today  low K diet   monitor     Hyponatremia   HD today  monitor     Acidosis   non AG + AG  HD today  monitor     CKD-MBD   - acceptable   p04 WNL  Monitor Ca, PO4 daily     Nausea/Vomiting  CT A/P shows constipation and wall thickening, worrisome for stercoral colitis  s/p disimpaction.  Colorectal surgery f/u

## 2024-05-05 NOTE — PROGRESS NOTE ADULT - PROBLEM SELECTOR PLAN 6
Need to verify Percocet - noted on HD paperwork but [place]    Added oxy 5 mg q6h prn for mod to severe pain  DVT ppx: heparin bid   Diet: Passed dysphagia screen dysphagia screen, Renal DASH TLC easy to chew  Plan of care discussed with hayden Vincent [phone] on 5/2. All questions answered.

## 2024-05-05 NOTE — CHART NOTE - NSCHARTNOTEFT_GEN_A_CORE
Morning BMP significant for K+ 5.7 (mildly hemolyzed) and Na+ 125. Patient remains on telemetry without acute events. STAT EKG ordered discussed with RN. 10 g of Lokelma ordered. Patient resting comfortably, without acute complaints or discomfort. Will discuss electrolyte abnormalities with Nephrology team for further recommendations.    Vital Signs Last 24 Hrs  T(C): 36.6 (05 May 2024 06:00), Max: 37.1 (04 May 2024 12:40)  T(F): 97.8 (05 May 2024 06:00), Max: 98.8 (04 May 2024 12:40)  HR: 79 (05 May 2024 06:00) (63 - 80)  BP: 153/65 (05 May 2024 06:00) (122/68 - 153/65)  BP(mean): --  RR: 18 (05 May 2024 06:00) (18 - 18)  SpO2: 95% (05 May 2024 06:00) (95% - 100%)    Parameters below as of 05 May 2024 06:00  Patient On (Oxygen Delivery Method): room air    D/w Attending     Kenan De Dios PA-C,   Internal Medicine ACP   In house pager #[phone] Morning BMP significant for K+ 5.7 (mildly hemolyzed) and Na+ 125. Patient remains on telemetry without acute events. STAT EKG ordered discussed with RN. 10 g of Lokelma ordered. Patient resting comfortably, without acute complaints or discomfort. Will discuss electrolyte abnormalities with Nephrology team for further recommendations.    Vital Signs Last 24 Hrs  T(C): 36.6 (05 May 2024 06:00), Max: 37.1 (04 May 2024 12:40)  T(F): 97.8 (05 May 2024 06:00), Max: 98.8 (04 May 2024 12:40)  HR: 79 (05 May 2024 06:00) (63 - 80)  BP: 153/65 (05 May 2024 06:00) (122/68 - 153/65)  BP(mean): --  RR: 18 (05 May 2024 06:00) (18 - 18)  SpO2: 95% (05 May 2024 06:00) (95% - 100%)    Parameters below as of 05 May 2024 06:00  Patient On (Oxygen Delivery Method): room air    Addendum: [id number]  - Spoke w/ Nephrology, Patient will be dialyzed today.     D/w Attending     Kenan De Dios PA-C,   Internal Medicine ACP   In house pager #[phone]

## 2024-05-05 NOTE — PROGRESS NOTE ADULT - SUBJECTIVE AND OBJECTIVE BOX
[facility]   MD MARIBELL CARRION MD ANGELA WONG, PA Venitha Krishnan, NP    TEL:  OFFICE: [phone]  From 5pm-7am Answering Service [phone]    -- RENAL FOLLOW UP NOTE ---Date of Service 05-05-24 @ 11:41    Patient is a 69y old  Male who presents with a chief complaint of nausea and vomiting (05 May 2024 11:15)      Patient seen and examined at bedside. in no apparent distress.    VITALS:  T(F): 97.5 (05-05-24 @ 09:00), Max: 98.8 (05-04-24 @ 12:40)  HR: 78 (05-05-24 @ 09:24)  BP: 129/51 (05-05-24 @ 09:00)  RR: 18 (05-05-24 @ 09:00)  SpO2: 98% (05-05-24 @ 09:24)  Wt(kg): --    05-04 @ 07:01  -  05-05 @ 07:00  --------------------------------------------------------  IN: 450 mL / OUT: 0 mL / NET: 450 mL          PHYSICAL EXAM:  General: NAD  Neck: No JVD  Respiratory: CTAB, no wheezes, rales or rhonchi  Cardiovascular: S1, S2, RRR  Gastrointestinal: BS+, soft, NT/ND  Extremities:  right BKA, left AKA,    Hospital Medications:   MEDICATIONS  (STANDING):  albuterol/ipratropium for Nebulization 3 milliLiter(s) Nebulizer every 6 hours  ascorbic acid 500 milliGRAM(s) Oral daily  aspirin enteric coated 81 milliGRAM(s) Oral daily  atorvastatin 40 milliGRAM(s) Oral at bedtime  carvedilol 6.25 milliGRAM(s) Oral every 12 hours  chlorhexidine 2% Cloths 1 Application(s) Topical daily  heparin   Injectable 5000 Unit(s) SubCutaneous every 12 hours  lactobacillus acidophilus 1 Tablet(s) Oral daily  lactulose Syrup 10 Gram(s) Oral daily  Nephro-hannah 1 Tablet(s) Oral daily  pantoprazole    Tablet 40 milliGRAM(s) Oral before breakfast  polyethylene glycol 3350 17 Gram(s) Oral daily  senna 2 Tablet(s) Oral at bedtime  sodium chloride 3%  Inhalation 4 milliLiter(s) Inhalation every 6 hours  sodium zirconium cyclosilicate 10 Gram(s) Oral once      LABS:  05-05    125<L>  |  88<L>  |  29<H>  ----------------------------<  88  5.7<H>   |  21<L>  |  4.67<H>    Ca    9.3      05 May 2024 05:29  Phos  3.5     05-05  Mg     3.20     05-05      Creatinine Trend: 4.67 <--, 4.43 <--, 4.15 <--, 4.13 <--, 3.04 <--, 5.16 <--, 3.73 <--, 5.05 <--, 3.93 <--, 5.51 <--    Phosphorus: 3.5 mg/dL (05-05 @ 05:29)  Phosphorus: 3.6 mg/dL (05-05 @ 00:10)  Phosphorus: 3.3 mg/dL (05-04 @ 17:50)  Phosphorus: 3.6 mg/dL (05-04 @ 16:30)                              13.9   4.52  )-----------( 198      ( 05 May 2024 05:29 )             45.1     Urine Studies:  Urinalysis - [05-05-24 @ 05:29]      Color  / Appearance  / SG  / pH       Gluc 88 / Ketone   / Bili  / Urobili        Blood  / Protein  / Leuk Est  / Nitrite       RBC  / WBC  / Hyaline  / Gran  / Sq Epi  / Non Sq Epi  / Bacteria       PTH -- (Ca --)      [04-28-24 @ 05:00]   155  HbA1c 4.2      [07-19-19 @ 09:56]  TSH 1.790      [06-23-23 @ 11:51]  Lipid: chol 119, TG 66, HDL 46, LDL --      [10-07-23 @ 09:30]    HBsAb 204.3      [05-03-24 @ 11:25]  HBsAg Nonreact      [05-04-24 @ 16:30]  HBcAb Nonreact      [05-03-24 @ 11:25]  HCV 0.12, Nonreact      [05-04-24 @ 16:30]      RADIOLOGY & ADDITIONAL STUDIES:

## 2024-05-05 NOTE — PROGRESS NOTE ADULT - SUBJECTIVE AND OBJECTIVE BOX
Patient is a 69y old  Male who presents with a chief complaint of nausea and vomiting (04 May 2024 16:59)      SUBJECTIVE / OVERNIGHT EVENTS:    No events overnight. This AM, CATRACHO PATRICK feels well. Has no n/v/d/cp/sob.      MEDICATIONS  (STANDING):  albuterol/ipratropium for Nebulization 3 milliLiter(s) Nebulizer every 6 hours  ascorbic acid 500 milliGRAM(s) Oral daily  aspirin enteric coated 81 milliGRAM(s) Oral daily  atorvastatin 40 milliGRAM(s) Oral at bedtime  carvedilol 6.25 milliGRAM(s) Oral every 12 hours  chlorhexidine 2% Cloths 1 Application(s) Topical daily  heparin   Injectable 5000 Unit(s) SubCutaneous every 12 hours  lactobacillus acidophilus 1 Tablet(s) Oral daily  lactulose Syrup 10 Gram(s) Oral daily  Nephro-hannah 1 Tablet(s) Oral daily  pantoprazole    Tablet 40 milliGRAM(s) Oral before breakfast  polyethylene glycol 3350 17 Gram(s) Oral daily  senna 2 Tablet(s) Oral at bedtime  sodium chloride 3%  Inhalation 4 milliLiter(s) Inhalation every 6 hours  sodium zirconium cyclosilicate 10 Gram(s) Oral once    MEDICATIONS  (PRN):  acetaminophen     Tablet .. 650 milliGRAM(s) Oral every 6 hours PRN Temp greater or equal to 38C (100.4F), Mild Pain (1 - 3)  aluminum hydroxide/magnesium hydroxide/simethicone Suspension 30 milliLiter(s) Oral every 4 hours PRN Dyspepsia  melatonin 3 milliGRAM(s) Oral at bedtime PRN Insomnia  ondansetron Injectable 4 milliGRAM(s) IV Push every 8 hours PRN Nausea and/or Vomiting  oxyCODONE    IR 5 milliGRAM(s) Oral every 6 hours PRN moderate to severe pain  sodium chloride 0.9% Bolus. 100 milliLiter(s) IV Bolus every 5 minutes PRN SBP LESS THAN or EQUAL to 80 mmHg      PHYSICAL EXAM:  T(C): 36.4 (05-05-24 @ 09:00), Max: 37.1 (05-04-24 @ 12:40)  HR: 78 (05-05-24 @ 09:24) (63 - 80)  BP: 129/51 (05-05-24 @ 09:00) (122/68 - 153/65)  RR: 18 (05-05-24 @ 09:00) (18 - 18)  SpO2: 98% (05-05-24 @ 09:24) (95% - 100%)  I&O's Summary    04 May 2024 07:01  -  05 May 2024 07:00  --------------------------------------------------------  IN: 450 mL / OUT: 0 mL / NET: 450 mL      GENERAL: NAD, lying in bed  HEAD:  Atraumatic, Normocephalic, MMM  CHEST/LUNG: No use of accessory muscles, CTAB, breathing non-labored  COR: RR, no mrcg  ABD: Soft, ND/NT, +BS  PSYCH: AAOxself  NEUROLOGY: CN II-XII grossly intact, moving all extremities  SKIN: No rashes or lesions  EXT: wwp, s/p b/l BKA    LABS:  CAPILLARY BLOOD GLUCOSE      POCT Blood Glucose.: 102 mg/dL (05 May 2024 11:04)  POCT Blood Glucose.: 71 mg/dL (05 May 2024 07:10)  POCT Blood Glucose.: 232 mg/dL (05 May 2024 02:41)  POCT Blood Glucose.: 107 mg/dL (05 May 2024 02:12)  POCT Blood Glucose.: 104 mg/dL (04 May 2024 21:21)  POCT Blood Glucose.: 96 mg/dL (04 May 2024 16:33)  POCT Blood Glucose.: 110 mg/dL (04 May 2024 11:19)                          13.9   4.52  )-----------( 198      ( 05 May 2024 05:29 )             45.1     05-05    125<L>  |  88<L>  |  29<H>  ----------------------------<  88  5.7<H>   |  21<L>  |  4.67<H>    Ca    9.3      05 May 2024 05:29  Phos  3.5     05-05  Mg     3.20     05-05            Urinalysis Basic - ( 05 May 2024 05:29 )    Color: x / Appearance: x / SG: x / pH: x  Gluc: 88 mg/dL / Ketone: x  / Bili: x / Urobili: x   Blood: x / Protein: x / Nitrite: x   Leuk Esterase: x / RBC: x / WBC x   Sq Epi: x / Non Sq Epi: x / Bacteria: x          RADIOLOGY & ADDITIONAL TESTS:    Telemetry Personally Reviewed -     Imaging Personally Reviewed -     Imaging Reviewed -     Consultant(s) Notes Reviewed -       Care Discussed with Consultants/Other Providers -

## 2024-05-06 LAB
ANION GAP SERPL CALC-SCNC: 12 MMOL/L — SIGNIFICANT CHANGE UP (ref 7–14)
ANION GAP SERPL CALC-SCNC: 16 MMOL/L — HIGH (ref 7–14)
BUN SERPL-MCNC: 15 MG/DL — SIGNIFICANT CHANGE UP (ref 7–23)
BUN SERPL-MCNC: 15 MG/DL — SIGNIFICANT CHANGE UP (ref 7–23)
CALCIUM SERPL-MCNC: 8.5 MG/DL — SIGNIFICANT CHANGE UP (ref 8.4–10.5)
CALCIUM SERPL-MCNC: 8.8 MG/DL — SIGNIFICANT CHANGE UP (ref 8.4–10.5)
CHLORIDE SERPL-SCNC: 97 MMOL/L — LOW (ref 98–107)
CHLORIDE SERPL-SCNC: 97 MMOL/L — LOW (ref 98–107)
CO2 SERPL-SCNC: 22 MMOL/L — SIGNIFICANT CHANGE UP (ref 22–31)
CO2 SERPL-SCNC: 29 MMOL/L — SIGNIFICANT CHANGE UP (ref 22–31)
CREAT SERPL-MCNC: 3.28 MG/DL — HIGH (ref 0.5–1.3)
CREAT SERPL-MCNC: 3.73 MG/DL — HIGH (ref 0.5–1.3)
EGFR: 17 ML/MIN/1.73M2 — LOW
EGFR: 19 ML/MIN/1.73M2 — LOW
GLUCOSE BLDC GLUCOMTR-MCNC: 121 MG/DL — HIGH (ref 70–99)
GLUCOSE BLDC GLUCOMTR-MCNC: 137 MG/DL — HIGH (ref 70–99)
GLUCOSE BLDC GLUCOMTR-MCNC: 73 MG/DL — SIGNIFICANT CHANGE UP (ref 70–99)
GLUCOSE BLDC GLUCOMTR-MCNC: 74 MG/DL — SIGNIFICANT CHANGE UP (ref 70–99)
GLUCOSE BLDC GLUCOMTR-MCNC: 76 MG/DL — SIGNIFICANT CHANGE UP (ref 70–99)
GLUCOSE BLDC GLUCOMTR-MCNC: 85 MG/DL — SIGNIFICANT CHANGE UP (ref 70–99)
GLUCOSE BLDC GLUCOMTR-MCNC: 98 MG/DL — SIGNIFICANT CHANGE UP (ref 70–99)
GLUCOSE SERPL-MCNC: 49 MG/DL — CRITICAL LOW (ref 70–99)
GLUCOSE SERPL-MCNC: 78 MG/DL — SIGNIFICANT CHANGE UP (ref 70–99)
HCT VFR BLD CALC: 39.6 % — SIGNIFICANT CHANGE UP (ref 39–50)
HGB BLD-MCNC: 12.1 G/DL — LOW (ref 13–17)
MAGNESIUM SERPL-MCNC: 2.9 MG/DL — HIGH (ref 1.6–2.6)
MAGNESIUM SERPL-MCNC: 2.9 MG/DL — HIGH (ref 1.6–2.6)
MCHC RBC-ENTMCNC: 26.8 PG — LOW (ref 27–34)
MCHC RBC-ENTMCNC: 30.6 GM/DL — LOW (ref 32–36)
MCV RBC AUTO: 87.6 FL — SIGNIFICANT CHANGE UP (ref 80–100)
NRBC # BLD: 0 /100 WBCS — SIGNIFICANT CHANGE UP (ref 0–0)
NRBC # FLD: 0 K/UL — SIGNIFICANT CHANGE UP (ref 0–0)
PHOSPHATE SERPL-MCNC: 3.7 MG/DL — SIGNIFICANT CHANGE UP (ref 2.5–4.5)
PHOSPHATE SERPL-MCNC: 3.9 MG/DL — SIGNIFICANT CHANGE UP (ref 2.5–4.5)
PLATELET # BLD AUTO: 209 K/UL — SIGNIFICANT CHANGE UP (ref 150–400)
POTASSIUM SERPL-MCNC: 4.3 MMOL/L — SIGNIFICANT CHANGE UP (ref 3.5–5.3)
POTASSIUM SERPL-MCNC: SIGNIFICANT CHANGE UP MMOL/L (ref 3.5–5.3)
POTASSIUM SERPL-SCNC: 4.3 MMOL/L — SIGNIFICANT CHANGE UP (ref 3.5–5.3)
POTASSIUM SERPL-SCNC: SIGNIFICANT CHANGE UP MMOL/L (ref 3.5–5.3)
RBC # BLD: 4.52 M/UL — SIGNIFICANT CHANGE UP (ref 4.2–5.8)
RBC # FLD: 19.5 % — HIGH (ref 10.3–14.5)
SODIUM SERPL-SCNC: 135 MMOL/L — SIGNIFICANT CHANGE UP (ref 135–145)
SODIUM SERPL-SCNC: 138 MMOL/L — SIGNIFICANT CHANGE UP (ref 135–145)
VANCOMYCIN FLD-MCNC: 18.2 UG/ML — SIGNIFICANT CHANGE UP
WBC # BLD: 2.95 K/UL — LOW (ref 3.8–10.5)
WBC # FLD AUTO: 2.95 K/UL — LOW (ref 3.8–10.5)

## 2024-05-06 PROCEDURE — 99232 SBSQ HOSP IP/OBS MODERATE 35: CPT

## 2024-05-06 RX ORDER — VANCOMYCIN HCL 1 G
500 VIAL (EA) INTRAVENOUS ONCE
Refills: 0 | Status: COMPLETED | OUTPATIENT
Start: 2024-05-06 | End: 2024-05-06

## 2024-05-06 RX ORDER — DEXTROSE 50 % IN WATER 50 %
25 SYRINGE (ML) INTRAVENOUS ONCE
Refills: 0 | Status: DISCONTINUED | OUTPATIENT
Start: 2024-05-06 | End: 2024-05-16

## 2024-05-06 RX ORDER — DEXTROSE 50 % IN WATER 50 %
15 SYRINGE (ML) INTRAVENOUS ONCE
Refills: 0 | Status: DISCONTINUED | OUTPATIENT
Start: 2024-05-06 | End: 2024-05-16

## 2024-05-06 RX ORDER — DEXTROSE 50 % IN WATER 50 %
15 SYRINGE (ML) INTRAVENOUS ONCE
Refills: 0 | Status: COMPLETED | OUTPATIENT
Start: 2024-05-06 | End: 2024-05-06

## 2024-05-06 RX ORDER — GLUCAGON INJECTION, SOLUTION 0.5 MG/.1ML
1 INJECTION, SOLUTION SUBCUTANEOUS ONCE
Refills: 0 | Status: DISCONTINUED | OUTPATIENT
Start: 2024-05-06 | End: 2024-05-16

## 2024-05-06 RX ORDER — DEXTROSE 50 % IN WATER 50 %
12.5 SYRINGE (ML) INTRAVENOUS ONCE
Refills: 0 | Status: DISCONTINUED | OUTPATIENT
Start: 2024-05-06 | End: 2024-05-16

## 2024-05-06 RX ADMIN — SODIUM CHLORIDE 4 MILLILITER(S): 9 INJECTION INTRAMUSCULAR; INTRAVENOUS; SUBCUTANEOUS at 08:52

## 2024-05-06 RX ADMIN — Medication 3 MILLILITER(S): at 08:52

## 2024-05-06 RX ADMIN — Medication 1 TABLET(S): at 11:05

## 2024-05-06 RX ADMIN — Medication 1 TABLET(S): at 11:06

## 2024-05-06 RX ADMIN — Medication 100 MILLIGRAM(S): at 22:00

## 2024-05-06 RX ADMIN — PANTOPRAZOLE SODIUM 40 MILLIGRAM(S): 20 TABLET, DELAYED RELEASE ORAL at 06:16

## 2024-05-06 RX ADMIN — HEPARIN SODIUM 5000 UNIT(S): 5000 INJECTION INTRAVENOUS; SUBCUTANEOUS at 06:17

## 2024-05-06 RX ADMIN — SODIUM CHLORIDE 4 MILLILITER(S): 9 INJECTION INTRAMUSCULAR; INTRAVENOUS; SUBCUTANEOUS at 21:17

## 2024-05-06 RX ADMIN — Medication 500 MILLIGRAM(S): at 11:05

## 2024-05-06 RX ADMIN — SENNA PLUS 2 TABLET(S): 8.6 TABLET ORAL at 22:00

## 2024-05-06 RX ADMIN — SODIUM CHLORIDE 4 MILLILITER(S): 9 INJECTION INTRAMUSCULAR; INTRAVENOUS; SUBCUTANEOUS at 03:53

## 2024-05-06 RX ADMIN — ATORVASTATIN CALCIUM 40 MILLIGRAM(S): 80 TABLET, FILM COATED ORAL at 22:00

## 2024-05-06 RX ADMIN — SODIUM CHLORIDE 4 MILLILITER(S): 9 INJECTION INTRAMUSCULAR; INTRAVENOUS; SUBCUTANEOUS at 15:37

## 2024-05-06 RX ADMIN — LACTULOSE 10 GRAM(S): 10 SOLUTION ORAL at 11:06

## 2024-05-06 RX ADMIN — Medication 3 MILLIGRAM(S): at 22:00

## 2024-05-06 RX ADMIN — CHLORHEXIDINE GLUCONATE 1 APPLICATION(S): 213 SOLUTION TOPICAL at 11:09

## 2024-05-06 RX ADMIN — Medication 3 MILLILITER(S): at 03:53

## 2024-05-06 RX ADMIN — Medication 3 MILLILITER(S): at 15:13

## 2024-05-06 RX ADMIN — POLYETHYLENE GLYCOL 3350 17 GRAM(S): 17 POWDER, FOR SOLUTION ORAL at 11:06

## 2024-05-06 RX ADMIN — Medication 15 GRAM(S): at 16:10

## 2024-05-06 RX ADMIN — Medication 81 MILLIGRAM(S): at 11:05

## 2024-05-06 RX ADMIN — Medication 3 MILLILITER(S): at 21:16

## 2024-05-06 NOTE — PROGRESS NOTE ADULT - ASSESSMENT
This is a 69 M w/ PMhx of ESRD via L AVF, CVA, R BKA, L AKA, bedbound, CAD, HTN, who is presenting to [facility] on 4/25/24 with vomiting.   In the ED, pt was febrile to 101.1, hypotensive to 66/42, hypoxic requiring 5L NC. Labs w/ mild leukocytosis to 11. Lactate 2.2, BCx w/ 4/4 MRSA.     #MRSA bacteremia, high grade  #Septic shock  #Lactic acidosis  #ESRD on HD via L AVF     Overall, 69 M w/ PMhx of ESRD via L AVF, CVA, R BKA, L AKA, bedbound, CAD, HTN admitted with high grade MRSA bacteremia, with no clear source.   CT A/P with stercoral colitis, lower chest with b/l PLEFF, L > R, possible consolidation?  Awaiting CT Chest, TTE/ISRAEL. Source may be PNA? unclear at this time, if bacteremia is persistent without clear source may need NM scan.   BCx from 4/27 1/2 positive, ISRAEL negative, BCx from 4/29 NGTD     Plan:   1. C/w Vancomycin 500 mg after HD, obtain trough prior to HD   2. ISRAEL no vegetations, VA duplex of AVF wnl     OPAT recommendations:   4 week course until 5/27  Vancomycin 500 mg post HD  Weekly CBC, BMP, vancomycin trough       Thank you for this consult. Inpatient ID team will sign off     Hector Umanzor M.D.  Attending Physician  Division of Infectious Diseases  Department of Medicine    Please contact through MS Teams message.  Office: [phone] (after 5 PM or weekend)

## 2024-05-06 NOTE — PROGRESS NOTE ADULT - SUBJECTIVE AND OBJECTIVE BOX
Edson Manzo MD  Academ[employer] Hospitalist  Pager [phone]  Email: [email or web address]  Available on [employer] Teams        PROGRESS NOTE:     Patient is a 69y old  Male who presents with a chief complaint of nausea and vomiting (06 May 2024 09:24)      SUBJECTIVE / OVERNIGHT EVENTS:  Patient seen and examined this morning. Opening eyes to stimulation, tracking but not very interactive.   ADDITIONAL REVIEW OF SYSTEMS:  Review of system unobtainable secondary to mental status.      MEDICATIONS  (STANDING):  albuterol/ipratropium for Nebulization 3 milliLiter(s) Nebulizer every 6 hours  ascorbic acid 500 milliGRAM(s) Oral daily  aspirin enteric coated 81 milliGRAM(s) Oral daily  atorvastatin 40 milliGRAM(s) Oral at bedtime  carvedilol 6.25 milliGRAM(s) Oral every 12 hours  chlorhexidine 2% Cloths 1 Application(s) Topical daily  heparin   Injectable 5000 Unit(s) SubCutaneous every 12 hours  lactobacillus acidophilus 1 Tablet(s) Oral daily  lactulose Syrup 10 Gram(s) Oral daily  Nephro-hannah 1 Tablet(s) Oral daily  pantoprazole    Tablet 40 milliGRAM(s) Oral before breakfast  polyethylene glycol 3350 17 Gram(s) Oral daily  senna 2 Tablet(s) Oral at bedtime  sodium chloride 3%  Inhalation 4 milliLiter(s) Inhalation every 6 hours    MEDICATIONS  (PRN):  acetaminophen     Tablet .. 650 milliGRAM(s) Oral every 6 hours PRN Temp greater or equal to 38C (100.4F), Mild Pain (1 - 3)  aluminum hydroxide/magnesium hydroxide/simethicone Suspension 30 milliLiter(s) Oral every 4 hours PRN Dyspepsia  melatonin 3 milliGRAM(s) Oral at bedtime PRN Insomnia  ondansetron Injectable 4 milliGRAM(s) IV Push every 8 hours PRN Nausea and/or Vomiting  oxyCODONE    IR 5 milliGRAM(s) Oral every 6 hours PRN moderate to severe pain  sodium chloride 0.9% Bolus. 100 milliLiter(s) IV Bolus every 5 minutes PRN SBP LESS THAN or EQUAL to 80 mmHg      CAPILLARY BLOOD GLUCOSE      POCT Blood Glucose.: 76 mg/dL (06 May 2024 11:15)  POCT Blood Glucose.: 74 mg/dL (06 May 2024 07:49)  POCT Blood Glucose.: 89 mg/dL (05 May 2024 21:13)  POCT Blood Glucose.: 97 mg/dL (05 May 2024 16:58)    I&O's Summary    05 May 2024 07:01  -  06 May 2024 07:00  --------------------------------------------------------  IN: 400 mL / OUT: 2215 mL / NET: -1815 mL        PHYSICAL EXAM:  Vital Signs Last 24 Hrs  T(C): 37.2 (06 May 2024 10:00), Max: 37.2 (06 May 2024 10:00)  T(F): 98.9 (06 May 2024 10:00), Max: 98.9 (06 May 2024 10:00)  HR: 76 (06 May 2024 10:00) (66 - 76)  BP: 142/74 (06 May 2024 10:00) (98/61 - 142/74)  BP(mean): --  RR: 18 (06 May 2024 10:00) (17 - 18)  SpO2: 100% (06 May 2024 10:00) (98% - 100%)    Parameters below as of 06 May 2024 10:00  Patient On (Oxygen Delivery Method): nasal cannula  O2 Flow (L/min): 2        GENERAL: NAD, lying in bed  HEAD:  Atraumatic, Normocephalic, MMM  CHEST/LUNG: No use of accessory muscles, CTAB, breathing non-labored  COR: RR, no mrcg  ABD: Soft, ND/NT, +BS  PSYCH: AAOxself  NEUROLOGY: CN II-XII grossly intact, moving all extremities  SKIN: No rashes or lesions  EXT: wwp, s/p b/l AKA and BKA    LABS:                        12.1   2.95  )-----------( 209      ( 06 May 2024 06:30 )             39.6     05-06    138  |  97<L>  |  15  ----------------------------<  78  4.3   |  29  |  3.73<H>    Ca    8.8      06 May 2024 11:05  Phos  3.9     05-06  Mg     2.90     05-06            Urinalysis Basic - ( 06 May 2024 11:05 )    Color: x / Appearance: x / SG: x / pH: x  Gluc: 78 mg/dL / Ketone: x  / Bili: x / Urobili: x   Blood: x / Protein: x / Nitrite: x   Leuk Esterase: x / RBC: x / WBC x   Sq Epi: x / Non Sq Epi: x / Bacteria: x          RADIOLOGY & ADDITIONAL TESTS:  Results Reviewed:   Imaging Personally Reviewed:  Electrocardiogram Personally Reviewed:    COORDINATION OF CARE:  Care Discussed with Consultants/Other Providers [Y/N]:  Prior or Outpatient Records Reviewed [Y/N]:

## 2024-05-06 NOTE — PROGRESS NOTE ADULT - SUBJECTIVE AND OBJECTIVE BOX
[facility] [place] [facility]   MD MARIBELL CARRION MD MARIA SANTIAGO, NP    TEL:  OFFICE: [phone]  From 5pm-7am Answering Service [phone]    -- RENAL FOLLOW UP NOTE ---Date of Service 05-06-24 @ 14:53    Patient is a 69y old  Male who presents with a chief complaint of nausea and vomiting       Patient seen and examined at bedside. No chest pain/sob    VITALS:  T(F): 98.9 (05-06-24 @ 10:00), Max: 98.9 (05-06-24 @ 10:00)  HR: 76 (05-06-24 @ 10:00)  BP: 142/74 (05-06-24 @ 10:00)  RR: 18 (05-06-24 @ 10:00)  SpO2: 100% (05-06-24 @ 10:00)  Wt(kg): --    05-05 @ 07:01  -  05-06 @ 07:00  --------------------------------------------------------  IN: 400 mL / OUT: 2215 mL / NET: -1815 mL          PHYSICAL EXAM:  General: NAD  Neck: No JVD  Respiratory: CTAB, no wheezes, rales or rhonchi  Cardiovascular: S1, S2, RRR  Gastrointestinal: BS+, soft, NT/ND  Extremities: R BKA, L AKA    Hospital Medications:   MEDICATIONS  (STANDING):  albuterol/ipratropium for Nebulization 3 milliLiter(s) Nebulizer every 6 hours  ascorbic acid 500 milliGRAM(s) Oral daily  aspirin enteric coated 81 milliGRAM(s) Oral daily  atorvastatin 40 milliGRAM(s) Oral at bedtime  carvedilol 6.25 milliGRAM(s) Oral every 12 hours  chlorhexidine 2% Cloths 1 Application(s) Topical daily  heparin   Injectable 5000 Unit(s) SubCutaneous every 12 hours  lactobacillus acidophilus 1 Tablet(s) Oral daily  lactulose Syrup 10 Gram(s) Oral daily  Nephro-hannah 1 Tablet(s) Oral daily  pantoprazole    Tablet 40 milliGRAM(s) Oral before breakfast  polyethylene glycol 3350 17 Gram(s) Oral daily  senna 2 Tablet(s) Oral at bedtime  sodium chloride 3%  Inhalation 4 milliLiter(s) Inhalation every 6 hours  vancomycin  IVPB 500 milliGRAM(s) IV Intermittent once      LABS:  05-06    138  |  97<L>  |  15  ----------------------------<  78  4.3   |  29  |  3.73<H>    Ca    8.8      06 May 2024 11:05  Phos  3.9     05-06  Mg     2.90     05-06      Creatinine Trend: 3.73 <--, 3.28 <--, 2.70 <--, 4.67 <--, 4.43 <--, 4.15 <--, 4.13 <--, 3.04 <--, 5.16 <--, 3.73 <--, 5.05 <--, 3.93 <--    Phosphorus: 3.9 mg/dL (05-06 @ 11:05)  Phosphorus: 3.7 mg/dL (05-06 @ 06:30)  Phosphorus: 2.8 mg/dL (05-05 @ 19:44)                              12.1   2.95  )-----------( 209      ( 06 May 2024 06:30 )             39.6     Urine Studies:  Urinalysis - [05-06-24 @ 11:05]      Color  / Appearance  / SG  / pH       Gluc 78 / Ketone   / Bili  / Urobili        Blood  / Protein  / Leuk Est  / Nitrite       RBC  / WBC  / Hyaline  / Gran  / Sq Epi  / Non Sq Epi  / Bacteria       PTH -- (Ca --)      [04-28-24 @ 05:00]   155  HbA1c 4.2      [07-19-19 @ 09:56]  TSH 1.790      [06-23-23 @ 11:51]  Lipid: chol 119, TG 66, HDL 46, LDL --      [10-07-23 @ 09:30]    HBsAb 204.3      [05-03-24 @ 11:25]  HBsAg Nonreact      [05-04-24 @ 16:30]  HBcAb Nonreact      [05-03-24 @ 11:25]  HCV 0.12, Nonreact      [05-04-24 @ 16:30]      RADIOLOGY & ADDITIONAL STUDIES:

## 2024-05-06 NOTE — PROGRESS NOTE ADULT - ASSESSMENT
69-year-old male with PMH end-stage renal on HD via left aVF, CVA, BKA/AKA, functionally quadriplegic, CAD, HTN, HLD, history of O2 as needed presents for evaluation of vomiting.  Brought from HD, started vomiting while receiving treatment.  Patient unable to endorse any specific complaints, per paperwork AO x 1/0 at baseline.  Patient denies chest pain or difficulty breathing, unable to really clarify about abdominal pain. Nephrology consulted for dialysis needs.    A/P   ESRD  Center: [facility]  Nephrologist: Dr. Navarro   Access: L AVF  MWF schedule.  Consent obtained from niece, and proxy, Ramonita Vincent via phone [phone]  s/p HD 5/5 due high K+ . On CALL HD RN notified  HD again today   Renal diet  Monitor BMP     HTN   BP fluctuating but controlled.  Low salt diet.  C/W current anti- hypertensives.  UF with HD   Monitor BP     Anemia  above goal.  TAINA w/ HD; hold for Hgb >11.  Monitor Hb     Hyperkalemia   HD 5/5  HD today   K hemolyzed today  repeat K   low K diet   monitor     Hyponatremia   s/p HD 5/5  better  monitor     Acidosis   non AG + AG  HD today  monitor     CKD-MBD   - acceptable   p04 WNL  Monitor Ca, PO4 daily     Nausea/Vomiting  CT A/P shows constipation and wall thickening, worrisome for stercoral colitis  s/p disimpaction.  Colorectal surgery f/u 69-year-old male with PMH end-stage renal on HD via left aVF, CVA, BKA/AKA, functionally quadriplegic, CAD, HTN, HLD, history of O2 as needed presents for evaluation of vomiting.  Brought from HD, started vomiting while receiving treatment.  Patient unable to endorse any specific complaints, per paperwork AO x 1/0 at baseline.  Patient denies chest pain or difficulty breathing, unable to really clarify about abdominal pain. Nephrology consulted for dialysis needs.    A/P   ESRD  Center: [place]  Nephrologist: Dr. Navarro   Access: L AVF  MWF schedule.  Consent obtained from niece, and proxy, Ramonita Vincent via phone [phone]  s/p HD 5/5 due high K+    HD again today   Renal diet  Monitor BMP     HTN   BP fluctuating but controlled.  Low salt diet.  C/W current anti- hypertensives.  UF with HD   Monitor BP     Anemia  above goal.  TAINA w/ HD; hold for Hgb >11.  Monitor Hb     Hyperkalemia   HD 5/5  HD today   K hemolyzed today  repeat K   low K diet   monitor     Hyponatremia   s/p HD 5/5  better  monitor     Acidosis   non AG + AG  HD today  monitor     CKD-MBD   - acceptable   p04 WNL  Monitor Ca, PO4 daily     Nausea/Vomiting  CT A/P shows constipation and wall thickening, worrisome for stercoral colitis  s/p disimpaction.  Colorectal surgery f/u

## 2024-05-06 NOTE — PROGRESS NOTE ADULT - SUBJECTIVE AND OBJECTIVE BOX
Infectious Diseases Follow Up:    Patient is a 69y old  Male who presents with a chief complaint of nausea and vomiting (05 May 2024 11:40)      Interval History/ROS:  No acute events noted, glucose decreased today AM    Allergies  No Known Allergies        ANTIMICROBIALS:      Current Abx:     Previous Abx     OTHER MEDS:  MEDICATIONS  (STANDING):  acetaminophen     Tablet .. 650 every 6 hours PRN  albuterol/ipratropium for Nebulization 3 every 6 hours  aluminum hydroxide/magnesium hydroxide/simethicone Suspension 30 every 4 hours PRN  aspirin enteric coated 81 daily  atorvastatin 40 at bedtime  carvedilol 6.25 every 12 hours  heparin   Injectable 5000 every 12 hours  lactulose Syrup 10 daily  melatonin 3 at bedtime PRN  ondansetron Injectable 4 every 8 hours PRN  oxyCODONE    IR 5 every 6 hours PRN  pantoprazole    Tablet 40 before breakfast  polyethylene glycol 3350 17 daily  senna 2 at bedtime  sodium chloride 3%  Inhalation 4 every 6 hours      Vital Signs Last 24 Hrs  T(C): 36.7 (06 May 2024 05:30), Max: 36.7 (06 May 2024 05:30)  T(F): 98 (06 May 2024 05:30), Max: 98 (06 May 2024 05:30)  HR: 73 (06 May 2024 09:00) (66 - 76)  BP: 121/45 (06 May 2024 05:30) (98/61 - 142/56)  BP(mean): --  RR: 18 (06 May 2024 05:30) (17 - 18)  SpO2: 100% (06 May 2024 09:00) (98% - 100%)    Parameters below as of 06 May 2024 09:00  Patient On (Oxygen Delivery Method): nasal cannula          PHYSICAL EXAM:  GENERAL: NAD, well-developed, lethargic   HEAD:  Atraumatic, Normocephalic  EYES: EOMI, conjunctiva and sclera clear=  CHEST/LUNG: Clear to auscultation bilaterally; No wheeze  HEART: Regular rate and rhythm; No murmurs, rubs, or gallops  ABDOMEN: Soft, Nontender, Nondistended; Bowel sounds present  EXTREMITIES:  R BKA, L AKA, stump without any acute findings, no skin breakdown. LUE AVF   PSYCH: AAOx0                          12.1   2.95  )-----------( 209      ( 06 May 2024 06:30 )             39.6       05-06    135  |  97<L>  |  15  ----------------------------<  49<LL>  TNP   |  22  |  3.28<H>    Ca    8.5      06 May 2024 06:30  Phos  3.7     05-06  Mg     2.90     05-06        Urinalysis Basic - ( 06 May 2024 06:30 )    Color: x / Appearance: x / SG: x / pH: x  Gluc: 49 mg/dL / Ketone: x  / Bili: x / Urobili: x   Blood: x / Protein: x / Nitrite: x   Leuk Esterase: x / RBC: x / WBC x   Sq Epi: x / Non Sq Epi: x / Bacteria: x        MICROBIOLOGY:  v  .Blood Blood-Venous  04-29-24   No growth at 5 days  --  --      .Blood Blood-Peripheral  04-29-24   No growth at 5 days  --  --      .Blood Blood-Peripheral  04-27-24   Growth in anaerobic bottle: Methicillin Resistant Staphylococcus aureus  See previous culture [id number]  --    Growth in anaerobic bottle: Gram Positive Cocci in Clusters      .Blood Blood-Venous  04-27-24   No growth at 5 days  --  --      .Blood Blood  04-26-24   No growth at 5 days  --  --      .Blood Blood  04-26-24   Growth in aerobic bottle: Methicillin Resistant Staphylococcus aureus  See previous culture [id number]  --    Growth in aerobic bottle: Gram Positive Cocci in Clusters      Clean Catch Clean Catch (Midstream)  04-24-24   No growth  --  --      .Blood Blood-Peripheral  04-24-24   Growth in aerobic and anaerobic bottles: Methicillin Resistant  Staphylococcus aureus  See previous culture [id number]  --    Growth in aerobic bottle: Gram Positive Cocci in Clusters  Growth in anaerobic bottle: Gram Positive Cocci in Clusters      .Blood Blood-Peripheral  04-24-24   Growth in aerobic and anaerobic bottles: Methicillin Resistant  Staphylococcus aureus  Direct identification is available within approximately 3-5  hours either by Blood Panel Multiplexed PCR or Direct  MALDI-TOF. Details: [email or web address]  --  Blood Culture PCR  Methicillin resistant Staphylococcus aureus                RADIOLOGY:

## 2024-05-06 NOTE — PROVIDER CONTACT NOTE (OTHER) - ACTION/TREATMENT ORDERED:
EMILIA Abdi notified order to give dextrose oral gel for FS >73, patient has poor oral intake. Orders verified and read back. EMILIA Abdi notified order to give dextrose oral gel for FS 73, patient has poor oral intake. Orders verified and read back.

## 2024-05-06 NOTE — PROGRESS NOTE ADULT - PROBLEM SELECTOR PLAN 6
Need to verify Percocet - noted on HD paperwork but not seen on [place]    Added oxy 5 mg q6h prn for mod to severe pain  DVT ppx: heparin bid   Diet: Passed dysphagia screen dysphagia screen, Renal DASH TLC easy to chew  Prior hospitalist discussed with hayden Vincent [phone] on 5/2. All questions answered.

## 2024-05-07 ENCOUNTER — TRANSCRIPTION ENCOUNTER (OUTPATIENT)
Age: 69
End: 2024-05-07

## 2024-05-07 DIAGNOSIS — E16.2 HYPOGLYCEMIA, UNSPECIFIED: ICD-10-CM

## 2024-05-07 LAB
ADD ON TEST-SPECIMEN IN LAB: SIGNIFICANT CHANGE UP
ANION GAP SERPL CALC-SCNC: 15 MMOL/L — HIGH (ref 7–14)
B-OH-BUTYR SERPL-SCNC: 0.2 MMOL/L — SIGNIFICANT CHANGE UP (ref 0–0.4)
BUN SERPL-MCNC: 10 MG/DL — SIGNIFICANT CHANGE UP (ref 7–23)
CALCIUM SERPL-MCNC: 8.5 MG/DL — SIGNIFICANT CHANGE UP (ref 8.4–10.5)
CHLORIDE SERPL-SCNC: 96 MMOL/L — LOW (ref 98–107)
CO2 SERPL-SCNC: 25 MMOL/L — SIGNIFICANT CHANGE UP (ref 22–31)
CREAT SERPL-MCNC: 2.69 MG/DL — HIGH (ref 0.5–1.3)
EGFR: 25 ML/MIN/1.73M2 — LOW
GLUCOSE BLDC GLUCOMTR-MCNC: 112 MG/DL — HIGH (ref 70–99)
GLUCOSE BLDC GLUCOMTR-MCNC: 117 MG/DL — HIGH (ref 70–99)
GLUCOSE BLDC GLUCOMTR-MCNC: 120 MG/DL — HIGH (ref 70–99)
GLUCOSE BLDC GLUCOMTR-MCNC: 202 MG/DL — HIGH (ref 70–99)
GLUCOSE BLDC GLUCOMTR-MCNC: 59 MG/DL — LOW (ref 70–99)
GLUCOSE BLDC GLUCOMTR-MCNC: 67 MG/DL — LOW (ref 70–99)
GLUCOSE BLDC GLUCOMTR-MCNC: 70 MG/DL — SIGNIFICANT CHANGE UP (ref 70–99)
GLUCOSE SERPL-MCNC: 42 MG/DL — CRITICAL LOW (ref 70–99)
HCT VFR BLD CALC: 38.9 % — LOW (ref 39–50)
HGB BLD-MCNC: 12.1 G/DL — LOW (ref 13–17)
MAGNESIUM SERPL-MCNC: 2.4 MG/DL — SIGNIFICANT CHANGE UP (ref 1.6–2.6)
MCHC RBC-ENTMCNC: 26.8 PG — LOW (ref 27–34)
MCHC RBC-ENTMCNC: 31.1 GM/DL — LOW (ref 32–36)
MCV RBC AUTO: 86.3 FL — SIGNIFICANT CHANGE UP (ref 80–100)
NRBC # BLD: 0 /100 WBCS — SIGNIFICANT CHANGE UP (ref 0–0)
NRBC # FLD: 0 K/UL — SIGNIFICANT CHANGE UP (ref 0–0)
PHOSPHATE SERPL-MCNC: 3 MG/DL — SIGNIFICANT CHANGE UP (ref 2.5–4.5)
PLATELET # BLD AUTO: 198 K/UL — SIGNIFICANT CHANGE UP (ref 150–400)
POTASSIUM SERPL-MCNC: 4.1 MMOL/L — SIGNIFICANT CHANGE UP (ref 3.5–5.3)
POTASSIUM SERPL-SCNC: 4.1 MMOL/L — SIGNIFICANT CHANGE UP (ref 3.5–5.3)
RBC # BLD: 4.51 M/UL — SIGNIFICANT CHANGE UP (ref 4.2–5.8)
RBC # FLD: 19.5 % — HIGH (ref 10.3–14.5)
SODIUM SERPL-SCNC: 136 MMOL/L — SIGNIFICANT CHANGE UP (ref 135–145)
TSH SERPL-MCNC: 1.96 UIU/ML — SIGNIFICANT CHANGE UP (ref 0.27–4.2)
WBC # BLD: 3.37 K/UL — LOW (ref 3.8–10.5)
WBC # FLD AUTO: 3.37 K/UL — LOW (ref 3.8–10.5)

## 2024-05-07 PROCEDURE — 99232 SBSQ HOSP IP/OBS MODERATE 35: CPT

## 2024-05-07 RX ORDER — DEXTROSE 50 % IN WATER 50 %
12.5 SYRINGE (ML) INTRAVENOUS ONCE
Refills: 0 | Status: COMPLETED | OUTPATIENT
Start: 2024-05-07 | End: 2024-05-07

## 2024-05-07 RX ADMIN — HEPARIN SODIUM 5000 UNIT(S): 5000 INJECTION INTRAVENOUS; SUBCUTANEOUS at 05:01

## 2024-05-07 RX ADMIN — Medication 12.5 GRAM(S): at 07:11

## 2024-05-07 RX ADMIN — PANTOPRAZOLE SODIUM 40 MILLIGRAM(S): 20 TABLET, DELAYED RELEASE ORAL at 05:01

## 2024-05-07 RX ADMIN — SENNA PLUS 2 TABLET(S): 8.6 TABLET ORAL at 21:41

## 2024-05-07 RX ADMIN — SODIUM CHLORIDE 4 MILLILITER(S): 9 INJECTION INTRAMUSCULAR; INTRAVENOUS; SUBCUTANEOUS at 07:10

## 2024-05-07 RX ADMIN — CARVEDILOL PHOSPHATE 6.25 MILLIGRAM(S): 80 CAPSULE, EXTENDED RELEASE ORAL at 05:01

## 2024-05-07 RX ADMIN — Medication 3 MILLIGRAM(S): at 21:41

## 2024-05-07 RX ADMIN — Medication 3 MILLILITER(S): at 07:10

## 2024-05-07 RX ADMIN — HEPARIN SODIUM 5000 UNIT(S): 5000 INJECTION INTRAVENOUS; SUBCUTANEOUS at 17:14

## 2024-05-07 RX ADMIN — CARVEDILOL PHOSPHATE 6.25 MILLIGRAM(S): 80 CAPSULE, EXTENDED RELEASE ORAL at 17:14

## 2024-05-07 RX ADMIN — CHLORHEXIDINE GLUCONATE 1 APPLICATION(S): 213 SOLUTION TOPICAL at 09:56

## 2024-05-07 RX ADMIN — Medication 1 TABLET(S): at 09:56

## 2024-05-07 RX ADMIN — Medication 500 MILLIGRAM(S): at 09:56

## 2024-05-07 RX ADMIN — LACTULOSE 10 GRAM(S): 10 SOLUTION ORAL at 09:55

## 2024-05-07 RX ADMIN — POLYETHYLENE GLYCOL 3350 17 GRAM(S): 17 POWDER, FOR SOLUTION ORAL at 09:56

## 2024-05-07 RX ADMIN — Medication 81 MILLIGRAM(S): at 09:56

## 2024-05-07 RX ADMIN — ATORVASTATIN CALCIUM 40 MILLIGRAM(S): 80 TABLET, FILM COATED ORAL at 21:41

## 2024-05-07 NOTE — PROGRESS NOTE ADULT - SUBJECTIVE AND OBJECTIVE BOX
[facility] [place] [facility]   MD MARIBELL CARRION MD MARIA SANTIAGO, NP    TEL:  OFFICE: [phone]  From 5pm-7am Answering Service [phone]    -- RENAL FOLLOW UP NOTE ---Date of Service 05-07-24 @ 14:13    Patient is a 69y old  Male who presents with a chief complaint of nausea and vomiting       Patient seen and examined at bedside. No chest pain/sob    VITALS:  T(F): 97.6 (05-07-24 @ 10:35), Max: 97.9 (05-07-24 @ 05:00)  HR: 78 (05-07-24 @ 10:35)  BP: 131/53 (05-07-24 @ 10:35)  RR: 18 (05-07-24 @ 10:35)  SpO2: 98% (05-07-24 @ 10:35)  Wt(kg): --    05-06 @ 07:01  -  05-07 @ 07:00  --------------------------------------------------------  IN: 686 mL / OUT: 948 mL / NET: -262 mL          PHYSICAL EXAM:  General: NAD  Neck: No JVD  Respiratory: CTAB, no wheezes, rales or rhonchi  Cardiovascular: S1, S2, RRR  Gastrointestinal: BS+, soft, NT/ND  Extremities: R BKA, L AKA    Hospital Medications:   MEDICATIONS  (STANDING):  albuterol/ipratropium for Nebulization 3 milliLiter(s) Nebulizer every 6 hours  ascorbic acid 500 milliGRAM(s) Oral daily  aspirin enteric coated 81 milliGRAM(s) Oral daily  atorvastatin 40 milliGRAM(s) Oral at bedtime  carvedilol 6.25 milliGRAM(s) Oral every 12 hours  chlorhexidine 2% Cloths 1 Application(s) Topical daily  dextrose 50% Injectable 25 Gram(s) IV Push once  dextrose 50% Injectable 25 Gram(s) IV Push once  dextrose 50% Injectable 12.5 Gram(s) IV Push once  dextrose Oral Gel 15 Gram(s) Oral once  glucagon  Injectable 1 milliGRAM(s) IntraMuscular once  heparin   Injectable 5000 Unit(s) SubCutaneous every 12 hours  lactobacillus acidophilus 1 Tablet(s) Oral daily  lactulose Syrup 10 Gram(s) Oral daily  Nephro-hannah 1 Tablet(s) Oral daily  pantoprazole    Tablet 40 milliGRAM(s) Oral before breakfast  polyethylene glycol 3350 17 Gram(s) Oral daily  senna 2 Tablet(s) Oral at bedtime  sodium chloride 3%  Inhalation 4 milliLiter(s) Inhalation every 6 hours      LABS:  05-07    136  |  96<L>  |  10  ----------------------------<  42<LL>  4.1   |  25  |  2.69<H>    Ca    8.5      07 May 2024 04:54  Phos  3.0     05-07  Mg     2.40     05-07      Creatinine Trend: 2.69 <--, 3.73 <--, 3.28 <--, 2.70 <--, 4.67 <--, 4.43 <--, 4.15 <--, 4.13 <--, 3.04 <--, 5.16 <--, 3.73 <--, 5.05 <--    Phosphorus: 3.0 mg/dL (05-07 @ 04:54)                              12.1   3.37  )-----------( 198      ( 07 May 2024 04:54 )             38.9     Urine Studies:  Urinalysis - [05-07-24 @ 04:54]      Color  / Appearance  / SG  / pH       Gluc 42 / Ketone   / Bili  / Urobili        Blood  / Protein  / Leuk Est  / Nitrite       RBC  / WBC  / Hyaline  / Gran  / Sq Epi  / Non Sq Epi  / Bacteria       PTH -- (Ca --)      [04-28-24 @ 05:00]   155  HbA1c 4.2      [07-19-19 @ 09:56]  TSH 1.96      [05-07-24 @ 04:54]  Lipid: chol 119, TG 66, HDL 46, LDL --      [10-07-23 @ 09:30]    HBsAb 204.3      [05-03-24 @ 11:25]  HBsAg Nonreact      [05-04-24 @ 16:30]  HBcAb Nonreact      [05-03-24 @ 11:25]  HCV 0.12, Nonreact      [05-04-24 @ 16:30]      RADIOLOGY & ADDITIONAL STUDIES:

## 2024-05-07 NOTE — CHART NOTE - NSCHARTNOTEFT_GEN_A_CORE
Source: Patient A&Ox [2]   other [x] RN/Chart Review      Medical Course: 69-year-old male with PMH end-stage renal on HD via left aVF, CVA, BKA/AKA, functionally quadriplegic, CAD, HTN, HLD, history of O2 as needed presents for evaluation of vomiting.  Brought from HD, started vomiting while receiving treatment.  Patient unable to endorse any specific complaints, per paperwork AO x 1/0 at baseline.  Patient denies chest pain or difficulty breathing, unable to really clarify about abdominal pain. Nephrology consulted for dialysis needs.    Nutrition Course: Patient seen at bedside with decreased cognition. RN provided collateral, who reports pt had good consumption this morning with 75% meal completion. However, as per RN flow sheet, Pt noted with PO intake consistently <50% PO intake. As per RN, pt is not eating Mighty Shake 1x daily (220kcal, 6gm pro per each serving), and Magic Cup 2x daily (580kcal, 18gm pro). Will trial Orgain Shake 2x daily (460cal, 32gm pro). Pt continues noted with sacrum wound as per RN flow sheet, provided with Nepro-hannah and ascorbic acid to promote wound healing. Probiotics ordered to promote GI          Diet, Pureed:   For patients receiving Renal Replacement - No Protein Restr, No Conc K, No Conc Phos, Low Sodium (RENAL) (05-02-24 @ 14:20)      GI: WDL. Last BM     PO intake:  < 50% [ ] 50-75% [ ]   % [ ]  other :    Enteral /Parenteral Nutrition:     Anthropometrics:   Weight (kg): 49 (04-26)    Edema:  Pressure Injuries:    __________________ Pertinent Medications__________________   MEDICATIONS  (STANDING):  albuterol/ipratropium for Nebulization 3 milliLiter(s) Nebulizer every 6 hours  ascorbic acid 500 milliGRAM(s) Oral daily  aspirin enteric coated 81 milliGRAM(s) Oral daily  atorvastatin 40 milliGRAM(s) Oral at bedtime  carvedilol 6.25 milliGRAM(s) Oral every 12 hours  chlorhexidine 2% Cloths 1 Application(s) Topical daily  dextrose 50% Injectable 25 Gram(s) IV Push once  dextrose 50% Injectable 25 Gram(s) IV Push once  dextrose 50% Injectable 12.5 Gram(s) IV Push once  dextrose Oral Gel 15 Gram(s) Oral once  glucagon  Injectable 1 milliGRAM(s) IntraMuscular once  heparin   Injectable 5000 Unit(s) SubCutaneous every 12 hours  lactobacillus acidophilus 1 Tablet(s) Oral daily  lactulose Syrup 10 Gram(s) Oral daily  Nephro-hannah 1 Tablet(s) Oral daily  pantoprazole    Tablet 40 milliGRAM(s) Oral before breakfast  polyethylene glycol 3350 17 Gram(s) Oral daily  senna 2 Tablet(s) Oral at bedtime  sodium chloride 3%  Inhalation 4 milliLiter(s) Inhalation every 6 hours    MEDICATIONS  (PRN):  acetaminophen     Tablet .. 650 milliGRAM(s) Oral every 6 hours PRN Temp greater or equal to 38C (100.4F), Mild Pain (1 - 3)  aluminum hydroxide/magnesium hydroxide/simethicone Suspension 30 milliLiter(s) Oral every 4 hours PRN Dyspepsia  melatonin 3 milliGRAM(s) Oral at bedtime PRN Insomnia  ondansetron Injectable 4 milliGRAM(s) IV Push every 8 hours PRN Nausea and/or Vomiting  oxyCODONE    IR 5 milliGRAM(s) Oral every 6 hours PRN moderate to severe pain  sodium chloride 0.9% Bolus. 100 milliLiter(s) IV Bolus every 5 minutes PRN SBP LESS THAN or EQUAL to 80 mmHg      __________________ Pertinent Labs__________________   05-07 Na136 mmol/L Glu 42 mg/dL<LL> K+ 4.1 mmol/L Cr  2.69 mg/dL<H> BUN 10 mg/dL 05-07 Phos 3.0 mg/dL        POCT Blood Glucose.: 120 mg/dL (05-07-24 @ 11:22)  POCT Blood Glucose.: 202 mg/dL (05-07-24 @ 07:29)  POCT Blood Glucose.: 67 mg/dL (05-07-24 @ 07:01)  POCT Blood Glucose.: 59 mg/dL (05-07-24 @ 06:53)  POCT Blood Glucose.: 137 mg/dL (05-06-24 @ 21:53)  POCT Blood Glucose.: 121 mg/dL (05-06-24 @ 18:17)  POCT Blood Glucose.: 98 mg/dL (05-06-24 @ 16:33)  POCT Blood Glucose.: 85 mg/dL (05-06-24 @ 16:30)  POCT Blood Glucose.: 73 mg/dL (05-06-24 @ 15:57)  POCT Blood Glucose.: 76 mg/dL (05-06-24 @ 11:15)  POCT Blood Glucose.: 74 mg/dL (05-06-24 @ 07:49)  POCT Blood Glucose.: 89 mg/dL (05-05-24 @ 21:13)  POCT Blood Glucose.: 97 mg/dL (05-05-24 @ 16:58)  POCT Blood Glucose.: 102 mg/dL (05-05-24 @ 11:04)  POCT Blood Glucose.: 71 mg/dL (05-05-24 @ 07:10)  POCT Blood Glucose.: 232 mg/dL (05-05-24 @ 02:41)  POCT Blood Glucose.: 107 mg/dL (05-05-24 @ 02:12)  POCT Blood Glucose.: 104 mg/dL (05-04-24 @ 21:21)  POCT Blood Glucose.: 96 mg/dL (05-04-24 @ 16:33)          Estimated Needs:   mani/d  gm pro/d    [ ] no change since previous assessment  [ ] recalculated:       Previous Nutrition Diagnosis:     Nutrition Diagnosis is [ ] ongoing  [ ] resolved [ ] not applicable     New Nutrition Diagnosis:      Recommendations:        Monitoring and Evaluation:      [ x] Tolerance to diet prescription [x ] weights [x ] follow up per protocol  [ ] other: Source: Patient A&Ox [2]   other [x] RN/Chart Review      Medical Course: 69-year-old male with PMH end-stage renal on HD via left aVF, CVA, BKA/AKA, functionally quadriplegic, CAD, HTN, HLD, history of O2 as needed presents for evaluation of vomiting.  Brought from HD, started vomiting while receiving treatment.  Patient unable to endorse any specific complaints, per paperwork AO x 1/0 at baseline.  Patient denies chest pain or difficulty breathing, unable to really clarify about abdominal pain. Nephrology consulted for dialysis needs.    Nutrition Course: Patient seen at bedside with decreased cognition. RN provided collateral, who reports pt had good consumption this morning with 75% meal completion. However, as per RN flow sheet, Pt noted with PO intake consistently <50% PO intake. As per RN, pt with poor intake of  Mighty Shake 1x daily (220kcal, 6gm pro per each serving), and Magic Cup 2x daily (580kcal, 18gm pro). Will trial Orgain Shake 2x daily (460cal, 32gm pro). Pt provided with Nepro-hannah and ascorbic acid for micronutrient coverage. Probiotics ordered to promote GI health. RD to remain available for further nutritional interventions as indicated.          Diet, Pureed:   For patients receiving Renal Replacement - No Protein Restr, No Conc K, No Conc Phos, Low Sodium (RENAL) (05-02-24 @ 14:20)      GI: WDL. Last BM 5/6 as per RN   PO intake:  < 50% [x ] 50-75% [x ]       Anthropometrics:   Weight (kg): 49 (04-26)    Edema: None reported at present.   Pressure Injuries: Healed sacrum wound per RN flow sheet     __________________ Pertinent Medications__________________   MEDICATIONS  (STANDING):  albuterol/ipratropium for Nebulization 3 milliLiter(s) Nebulizer every 6 hours  ascorbic acid 500 milliGRAM(s) Oral daily  aspirin enteric coated 81 milliGRAM(s) Oral daily  atorvastatin 40 milliGRAM(s) Oral at bedtime  carvedilol 6.25 milliGRAM(s) Oral every 12 hours  chlorhexidine 2% Cloths 1 Application(s) Topical daily  dextrose 50% Injectable 25 Gram(s) IV Push once  dextrose 50% Injectable 25 Gram(s) IV Push once  dextrose 50% Injectable 12.5 Gram(s) IV Push once  dextrose Oral Gel 15 Gram(s) Oral once  glucagon  Injectable 1 milliGRAM(s) IntraMuscular once  heparin   Injectable 5000 Unit(s) SubCutaneous every 12 hours  lactobacillus acidophilus 1 Tablet(s) Oral daily  lactulose Syrup 10 Gram(s) Oral daily  Nephro-hannah 1 Tablet(s) Oral daily  pantoprazole    Tablet 40 milliGRAM(s) Oral before breakfast  polyethylene glycol 3350 17 Gram(s) Oral daily  senna 2 Tablet(s) Oral at bedtime  sodium chloride 3%  Inhalation 4 milliLiter(s) Inhalation every 6 hours    MEDICATIONS  (PRN):  acetaminophen     Tablet .. 650 milliGRAM(s) Oral every 6 hours PRN Temp greater or equal to 38C (100.4F), Mild Pain (1 - 3)  aluminum hydroxide/magnesium hydroxide/simethicone Suspension 30 milliLiter(s) Oral every 4 hours PRN Dyspepsia  melatonin 3 milliGRAM(s) Oral at bedtime PRN Insomnia  ondansetron Injectable 4 milliGRAM(s) IV Push every 8 hours PRN Nausea and/or Vomiting  oxyCODONE    IR 5 milliGRAM(s) Oral every 6 hours PRN moderate to severe pain  sodium chloride 0.9% Bolus. 100 milliLiter(s) IV Bolus every 5 minutes PRN SBP LESS THAN or EQUAL to 80 mmHg      __________________ Pertinent Labs__________________   05-07 Na136 mmol/L Glu 42 mg/dL<LL> K+ 4.1 mmol/L Cr  2.69 mg/dL<H> BUN 10 mg/dL 05-07 Phos 3.0 mg/dL        POCT Blood Glucose.: 120 mg/dL (05-07-24 @ 11:22)  POCT Blood Glucose.: 202 mg/dL (05-07-24 @ 07:29)  POCT Blood Glucose.: 67 mg/dL (05-07-24 @ 07:01)  POCT Blood Glucose.: 59 mg/dL (05-07-24 @ 06:53)  POCT Blood Glucose.: 137 mg/dL (05-06-24 @ 21:53)  POCT Blood Glucose.: 121 mg/dL (05-06-24 @ 18:17)  POCT Blood Glucose.: 98 mg/dL (05-06-24 @ 16:33)  POCT Blood Glucose.: 85 mg/dL (05-06-24 @ 16:30)  POCT Blood Glucose.: 73 mg/dL (05-06-24 @ 15:57)  POCT Blood Glucose.: 76 mg/dL (05-06-24 @ 11:15)  POCT Blood Glucose.: 74 mg/dL (05-06-24 @ 07:49)  POCT Blood Glucose.: 89 mg/dL (05-05-24 @ 21:13)  POCT Blood Glucose.: 97 mg/dL (05-05-24 @ 16:58)  POCT Blood Glucose.: 102 mg/dL (05-05-24 @ 11:04)  POCT Blood Glucose.: 71 mg/dL (05-05-24 @ 07:10)  POCT Blood Glucose.: 232 mg/dL (05-05-24 @ 02:41)  POCT Blood Glucose.: 107 mg/dL (05-05-24 @ 02:12)  POCT Blood Glucose.: 104 mg/dL (05-04-24 @ 21:21)  POCT Blood Glucose.: 96 mg/dL (05-04-24 @ 16:33)          Estimated Needs:   [ x] no change since previous assessment        Previous Nutrition Diagnosis: Severe malnutrition     Nutrition Diagnosis is [x ] ongoing       Recommendations:  1) Recommend continue with current diet, which remains appropriate at this time.   2) Encourage PO intake and honor food preferences as able. Provide feeding assistance PRN to encourage PO intake.   3) Monitor PO intake, Labs, weights, BMs, and skin integrity.   4) RD to remain available for further nutritional interventions as indicated.       Monitoring and Evaluation:      [ x] Tolerance to diet prescription [x ] weights [x ] follow up per protocol  [ ] other:

## 2024-05-07 NOTE — DISCHARGE NOTE NURSING/CASE MANAGEMENT/SOCIAL WORK - NSDCVIVACCINE_GEN_ALL_CORE_FT
Tdap; 15-Aug-2017 22:58; Shalom Stanley); [employer]; [id number]; IntraMuscular; Deltoid Left.; 0.5 milliLiter(s); VIS (VIS Published: 09-May-2013, VIS Presented: 15-Aug-2017);

## 2024-05-07 NOTE — DISCHARGE NOTE NURSING/CASE MANAGEMENT/SOCIAL WORK - PATIENT PORTAL LINK FT
You can access the FollowMyHealth Patient Portal offered by [facility] by registering at the following website: [email or web address]. By joining [employer]’s FollowMyHealth portal, you will also be able to view your health information using other applications (apps) compatible with our system.

## 2024-05-07 NOTE — DISCHARGE NOTE NURSING/CASE MANAGEMENT/SOCIAL WORK - NSDCCRNAME_GEN_ALL_CORE_FT
[facility] ([address] [place] [address])

## 2024-05-07 NOTE — PROGRESS NOTE ADULT - SUBJECTIVE AND OBJECTIVE BOX
Edson Manzo MD  Academic Hospitalist  Pager [phone]  Email: [email or web address]  Available on [employer] Teams        PROGRESS NOTE:     Patient is a 69y old  Male who presents with a chief complaint of nausea and vomiting (06 May 2024 14:53)      SUBJECTIVE / OVERNIGHT EVENTS:  Patient seen and examined this morning. Patient with poor PO intake yesterday as per nurse and therefore had another episode of hypoglycemia this morning. Patient is more awake this morning, able to answer and follow very simple commands.   ADDITIONAL REVIEW OF SYSTEMS:  Review of system unobtainable secondary to mental status.      MEDICATIONS  (STANDING):  albuterol/ipratropium for Nebulization 3 milliLiter(s) Nebulizer every 6 hours  ascorbic acid 500 milliGRAM(s) Oral daily  aspirin enteric coated 81 milliGRAM(s) Oral daily  atorvastatin 40 milliGRAM(s) Oral at bedtime  carvedilol 6.25 milliGRAM(s) Oral every 12 hours  chlorhexidine 2% Cloths 1 Application(s) Topical daily  dextrose 50% Injectable 12.5 Gram(s) IV Push once  dextrose 50% Injectable 25 Gram(s) IV Push once  dextrose 50% Injectable 25 Gram(s) IV Push once  dextrose Oral Gel 15 Gram(s) Oral once  glucagon  Injectable 1 milliGRAM(s) IntraMuscular once  heparin   Injectable 5000 Unit(s) SubCutaneous every 12 hours  lactobacillus acidophilus 1 Tablet(s) Oral daily  lactulose Syrup 10 Gram(s) Oral daily  Nephro-hannah 1 Tablet(s) Oral daily  pantoprazole    Tablet 40 milliGRAM(s) Oral before breakfast  polyethylene glycol 3350 17 Gram(s) Oral daily  senna 2 Tablet(s) Oral at bedtime  sodium chloride 3%  Inhalation 4 milliLiter(s) Inhalation every 6 hours    MEDICATIONS  (PRN):  acetaminophen     Tablet .. 650 milliGRAM(s) Oral every 6 hours PRN Temp greater or equal to 38C (100.4F), Mild Pain (1 - 3)  aluminum hydroxide/magnesium hydroxide/simethicone Suspension 30 milliLiter(s) Oral every 4 hours PRN Dyspepsia  melatonin 3 milliGRAM(s) Oral at bedtime PRN Insomnia  ondansetron Injectable 4 milliGRAM(s) IV Push every 8 hours PRN Nausea and/or Vomiting  oxyCODONE    IR 5 milliGRAM(s) Oral every 6 hours PRN moderate to severe pain  sodium chloride 0.9% Bolus. 100 milliLiter(s) IV Bolus every 5 minutes PRN SBP LESS THAN or EQUAL to 80 mmHg      CAPILLARY BLOOD GLUCOSE      POCT Blood Glucose.: 120 mg/dL (07 May 2024 11:22)  POCT Blood Glucose.: 202 mg/dL (07 May 2024 07:29)  POCT Blood Glucose.: 67 mg/dL (07 May 2024 07:01)  POCT Blood Glucose.: 59 mg/dL (07 May 2024 06:53)  POCT Blood Glucose.: 137 mg/dL (06 May 2024 21:53)  POCT Blood Glucose.: 121 mg/dL (06 May 2024 18:17)  POCT Blood Glucose.: 98 mg/dL (06 May 2024 16:33)  POCT Blood Glucose.: 85 mg/dL (06 May 2024 16:30)  POCT Blood Glucose.: 73 mg/dL (06 May 2024 15:57)    I&O's Summary    06 May 2024 07:01  -  07 May 2024 07:00  --------------------------------------------------------  IN: 686 mL / OUT: 948 mL / NET: -262 mL        PHYSICAL EXAM:  Vital Signs Last 24 Hrs  T(C): 36.4 (07 May 2024 10:35), Max: 36.6 (07 May 2024 05:00)  T(F): 97.6 (07 May 2024 10:35), Max: 97.9 (07 May 2024 05:00)  HR: 78 (07 May 2024 10:35) (67 - 78)  BP: 131/53 (07 May 2024 10:35) (108/57 - 131/53)  BP(mean): --  RR: 18 (07 May 2024 10:35) (17 - 18)  SpO2: 98% (07 May 2024 10:35) (95% - 100%)    Parameters below as of 07 May 2024 10:35  Patient On (Oxygen Delivery Method): nasal cannula  O2 Flow (L/min): 2      GENERAL: NAD, lying in bed, more awake this morning compared to yesterday, interactive however very confused  CHEST/LUNG: No use of accessory muscles, CTAB, breathing non-labored  COR: RR, no mrcg  ABD: Soft, ND/NT, +BS  PSYCH: AAOxself  SKIN: No rashes or lesions  EXT: wwp, s/p b/l AKA and BKA    LABS:                        12.1   3.37  )-----------( 198      ( 07 May 2024 04:54 )             38.9     05-07    136  |  96<L>  |  10  ----------------------------<  42<LL>  4.1   |  25  |  2.69<H>    Ca    8.5      07 May 2024 04:54  Phos  3.0     05-07  Mg     2.40     05-07            Urinalysis Basic - ( 07 May 2024 04:54 )    Color: x / Appearance: x / SG: x / pH: x  Gluc: 42 mg/dL / Ketone: x  / Bili: x / Urobili: x   Blood: x / Protein: x / Nitrite: x   Leuk Esterase: x / RBC: x / WBC x   Sq Epi: x / Non Sq Epi: x / Bacteria: x          RADIOLOGY & ADDITIONAL TESTS:  Results Reviewed:   Imaging Personally Reviewed:  Electrocardiogram Personally Reviewed:    COORDINATION OF CARE:  Care Discussed with Consultants/Other Providers [Y/N]:  Prior or Outpatient Records Reviewed [Y/N]:

## 2024-05-07 NOTE — PROGRESS NOTE ADULT - PROBLEM SELECTOR PLAN 6
- Continue with Atorvastatin 40mg qhs   - Continue with ASA 81mg qd  - Cardiology consulted for abnormal appearing myocardium on CT abdomen/pelvis; however, heart appears to be normal size on CT chest    # Elevated Troponin, likely demand and also in setting of ESRD  - Troponin 384, 355  - No need to keep trending    #Chronic systolic CHF  - ISRAEL with severely reduced EF, LV global hypokinesis  - Likely driven by CAD  - started carvedilol; appreciate cardio recs

## 2024-05-07 NOTE — PROGRESS NOTE ADULT - PROBLEM SELECTOR PLAN 1
Due to MRSA bacteremia  -unclear source of MRSA infection  -CT chest without obvious PNA, did mention a masslike consolidation in LLL most suggestive of atelectasis  -urine culture with no growth  -ID following, appreciate recs  -c/w vancomycin 1000mg after HD on HD days M/W/F; will need through 5/27  -ISRAEL w/o vegetation, but noted reduced global hypokinesis

## 2024-05-07 NOTE — PROGRESS NOTE ADULT - ASSESSMENT
69-year-old male with PMH end-stage renal on HD via left aVF, CVA, BKA/AKA, functionally quadriplegic, CAD, HTN, HLD, history of O2 as needed presents for evaluation of vomiting.  Brought from HD, started vomiting while receiving treatment.  Patient unable to endorse any specific complaints, per paperwork AO x 1/0 at baseline.  Patient denies chest pain or difficulty breathing, unable to really clarify about abdominal pain. Nephrology consulted for dialysis needs.    A/P   ESRD  Center: [place]  Nephrologist: Dr. Navarro   Access: L AVF  MWF schedule.  Consent obtained from niece, and proxy, Ramonita Vincent via phone [phone]  s/p HD 5/5 due high K+    s/p HD 5/6    Renal diet  Monitor BMP     HTN   BP fluctuating but controlled.  Low salt diet.  C/W current anti- hypertensives.  UF with HD   Monitor BP     Anemia  above goal.  TAINA w/ HD; hold for Hgb >11.  Monitor Hb     Hyperkalemia   s/p HD 5/5, 5/6  better  repeat K   low K diet   monitor     Hyponatremia   s/p HD 5/6  better  monitor     Acidosis   non AG + AG  monitor     CKD-MBD   - acceptable   p04 WNL  Monitor Ca, PO4 daily     Nausea/Vomiting  CT A/P shows constipation and wall thickening, worrisome for stercoral colitis  s/p disimpaction.  Colorectal surgery f/u

## 2024-05-08 LAB
ANION GAP SERPL CALC-SCNC: 15 MMOL/L — HIGH (ref 7–14)
BUN SERPL-MCNC: 16 MG/DL — SIGNIFICANT CHANGE UP (ref 7–23)
CALCIUM SERPL-MCNC: 8.9 MG/DL — SIGNIFICANT CHANGE UP (ref 8.4–10.5)
CHLORIDE SERPL-SCNC: 95 MMOL/L — LOW (ref 98–107)
CO2 SERPL-SCNC: 26 MMOL/L — SIGNIFICANT CHANGE UP (ref 22–31)
CREAT SERPL-MCNC: 4.1 MG/DL — HIGH (ref 0.5–1.3)
EGFR: 15 ML/MIN/1.73M2 — LOW
GLUCOSE BLDC GLUCOMTR-MCNC: 147 MG/DL — HIGH (ref 70–99)
GLUCOSE BLDC GLUCOMTR-MCNC: 78 MG/DL — SIGNIFICANT CHANGE UP (ref 70–99)
GLUCOSE BLDC GLUCOMTR-MCNC: 80 MG/DL — SIGNIFICANT CHANGE UP (ref 70–99)
GLUCOSE BLDC GLUCOMTR-MCNC: 83 MG/DL — SIGNIFICANT CHANGE UP (ref 70–99)
GLUCOSE BLDC GLUCOMTR-MCNC: 89 MG/DL — SIGNIFICANT CHANGE UP (ref 70–99)
GLUCOSE BLDC GLUCOMTR-MCNC: 89 MG/DL — SIGNIFICANT CHANGE UP (ref 70–99)
GLUCOSE BLDC GLUCOMTR-MCNC: 99 MG/DL — SIGNIFICANT CHANGE UP (ref 70–99)
GLUCOSE SERPL-MCNC: 65 MG/DL — LOW (ref 70–99)
HCT VFR BLD CALC: 35 % — LOW (ref 39–50)
HGB BLD-MCNC: 10.7 G/DL — LOW (ref 13–17)
MAGNESIUM SERPL-MCNC: 2.7 MG/DL — HIGH (ref 1.6–2.6)
MCHC RBC-ENTMCNC: 26.7 PG — LOW (ref 27–34)
MCHC RBC-ENTMCNC: 30.6 GM/DL — LOW (ref 32–36)
MCV RBC AUTO: 87.3 FL — SIGNIFICANT CHANGE UP (ref 80–100)
NRBC # BLD: 0 /100 WBCS — SIGNIFICANT CHANGE UP (ref 0–0)
NRBC # FLD: 0 K/UL — SIGNIFICANT CHANGE UP (ref 0–0)
PHOSPHATE SERPL-MCNC: 3.6 MG/DL — SIGNIFICANT CHANGE UP (ref 2.5–4.5)
PLATELET # BLD AUTO: 212 K/UL — SIGNIFICANT CHANGE UP (ref 150–400)
POTASSIUM SERPL-MCNC: 4.3 MMOL/L — SIGNIFICANT CHANGE UP (ref 3.5–5.3)
POTASSIUM SERPL-SCNC: 4.3 MMOL/L — SIGNIFICANT CHANGE UP (ref 3.5–5.3)
RBC # BLD: 4.01 M/UL — LOW (ref 4.2–5.8)
RBC # FLD: 19.2 % — HIGH (ref 10.3–14.5)
SODIUM SERPL-SCNC: 136 MMOL/L — SIGNIFICANT CHANGE UP (ref 135–145)
VANCOMYCIN TROUGH SERPL-MCNC: 15 UG/ML — SIGNIFICANT CHANGE UP (ref 10–20)
WBC # BLD: 3.13 K/UL — LOW (ref 3.8–10.5)
WBC # FLD AUTO: 3.13 K/UL — LOW (ref 3.8–10.5)

## 2024-05-08 PROCEDURE — 99232 SBSQ HOSP IP/OBS MODERATE 35: CPT

## 2024-05-08 RX ORDER — VANCOMYCIN HCL 1 G
500 VIAL (EA) INTRAVENOUS ONCE
Refills: 0 | Status: COMPLETED | OUTPATIENT
Start: 2024-05-08 | End: 2024-05-08

## 2024-05-08 RX ADMIN — POLYETHYLENE GLYCOL 3350 17 GRAM(S): 17 POWDER, FOR SOLUTION ORAL at 11:33

## 2024-05-08 RX ADMIN — PANTOPRAZOLE SODIUM 40 MILLIGRAM(S): 20 TABLET, DELAYED RELEASE ORAL at 07:31

## 2024-05-08 RX ADMIN — LACTULOSE 10 GRAM(S): 10 SOLUTION ORAL at 11:33

## 2024-05-08 RX ADMIN — SODIUM CHLORIDE 4 MILLILITER(S): 9 INJECTION INTRAMUSCULAR; INTRAVENOUS; SUBCUTANEOUS at 21:28

## 2024-05-08 RX ADMIN — Medication 100 MILLIGRAM(S): at 17:12

## 2024-05-08 RX ADMIN — Medication 500 MILLIGRAM(S): at 11:34

## 2024-05-08 RX ADMIN — Medication 1 TABLET(S): at 11:34

## 2024-05-08 RX ADMIN — Medication 81 MILLIGRAM(S): at 11:34

## 2024-05-08 RX ADMIN — HEPARIN SODIUM 5000 UNIT(S): 5000 INJECTION INTRAVENOUS; SUBCUTANEOUS at 05:38

## 2024-05-08 RX ADMIN — Medication 3 MILLILITER(S): at 15:20

## 2024-05-08 RX ADMIN — Medication 1 TABLET(S): at 11:33

## 2024-05-08 RX ADMIN — SENNA PLUS 2 TABLET(S): 8.6 TABLET ORAL at 21:55

## 2024-05-08 RX ADMIN — CHLORHEXIDINE GLUCONATE 1 APPLICATION(S): 213 SOLUTION TOPICAL at 11:35

## 2024-05-08 RX ADMIN — Medication 3 MILLILITER(S): at 21:27

## 2024-05-08 RX ADMIN — ATORVASTATIN CALCIUM 40 MILLIGRAM(S): 80 TABLET, FILM COATED ORAL at 21:55

## 2024-05-08 RX ADMIN — CARVEDILOL PHOSPHATE 6.25 MILLIGRAM(S): 80 CAPSULE, EXTENDED RELEASE ORAL at 17:13

## 2024-05-08 RX ADMIN — HEPARIN SODIUM 5000 UNIT(S): 5000 INJECTION INTRAVENOUS; SUBCUTANEOUS at 17:13

## 2024-05-08 NOTE — PROGRESS NOTE ADULT - SUBJECTIVE AND OBJECTIVE BOX
Edson Manzo MD  Academic Hospitalist  Pager [phone]  Email: [email or web address]  Available on [employer] Teams        PROGRESS NOTE:     Patient is a 69y old  Male who presents with a chief complaint of nausea and vomiting (08 May 2024 07:29)      SUBJECTIVE / OVERNIGHT EVENTS:  Patient seen and examined this morning. Normoglycemic this morning, likely d/t better PO intake yesterday.   ADDITIONAL REVIEW OF SYSTEMS:  Review of system unobtainable secondary to mental status.      MEDICATIONS  (STANDING):  albuterol/ipratropium for Nebulization 3 milliLiter(s) Nebulizer every 6 hours  ascorbic acid 500 milliGRAM(s) Oral daily  aspirin enteric coated 81 milliGRAM(s) Oral daily  atorvastatin 40 milliGRAM(s) Oral at bedtime  carvedilol 6.25 milliGRAM(s) Oral every 12 hours  chlorhexidine 2% Cloths 1 Application(s) Topical daily  dextrose 50% Injectable 25 Gram(s) IV Push once  dextrose 50% Injectable 12.5 Gram(s) IV Push once  dextrose 50% Injectable 25 Gram(s) IV Push once  dextrose Oral Gel 15 Gram(s) Oral once  glucagon  Injectable 1 milliGRAM(s) IntraMuscular once  heparin   Injectable 5000 Unit(s) SubCutaneous every 12 hours  lactobacillus acidophilus 1 Tablet(s) Oral daily  lactulose Syrup 10 Gram(s) Oral daily  Nephro-hannah 1 Tablet(s) Oral daily  pantoprazole    Tablet 40 milliGRAM(s) Oral before breakfast  polyethylene glycol 3350 17 Gram(s) Oral daily  senna 2 Tablet(s) Oral at bedtime  sodium chloride 3%  Inhalation 4 milliLiter(s) Inhalation every 6 hours    MEDICATIONS  (PRN):  acetaminophen     Tablet .. 650 milliGRAM(s) Oral every 6 hours PRN Temp greater or equal to 38C (100.4F), Mild Pain (1 - 3)  aluminum hydroxide/magnesium hydroxide/simethicone Suspension 30 milliLiter(s) Oral every 4 hours PRN Dyspepsia  melatonin 3 milliGRAM(s) Oral at bedtime PRN Insomnia  ondansetron Injectable 4 milliGRAM(s) IV Push every 8 hours PRN Nausea and/or Vomiting  oxyCODONE    IR 5 milliGRAM(s) Oral every 6 hours PRN moderate to severe pain  sodium chloride 0.9% Bolus. 100 milliLiter(s) IV Bolus every 5 minutes PRN SBP LESS THAN or EQUAL to 80 mmHg      CAPILLARY BLOOD GLUCOSE      POCT Blood Glucose.: 147 mg/dL (08 May 2024 11:12)  POCT Blood Glucose.: 89 mg/dL (08 May 2024 09:24)  POCT Blood Glucose.: 78 mg/dL (08 May 2024 05:41)  POCT Blood Glucose.: 83 mg/dL (08 May 2024 03:41)  POCT Blood Glucose.: 112 mg/dL (07 May 2024 21:50)  POCT Blood Glucose.: 117 mg/dL (07 May 2024 17:56)  POCT Blood Glucose.: 70 mg/dL (07 May 2024 16:56)    I&O's Summary    08 May 2024 07:01  -  08 May 2024 12:21  --------------------------------------------------------  IN: 400 mL / OUT: 1300 mL / NET: -900 mL        PHYSICAL EXAM:  Vital Signs Last 24 Hrs  T(C): 36.6 (08 May 2024 09:52), Max: 37.1 (08 May 2024 06:00)  T(F): 97.8 (08 May 2024 09:52), Max: 98.8 (08 May 2024 06:00)  HR: 61 (08 May 2024 09:52) (61 - 98)  BP: 121/61 (08 May 2024 09:52) (114/63 - 137/45)  BP(mean): --  RR: 17 (08 May 2024 09:52) (17 - 18)  SpO2: 100% (08 May 2024 09:52) (100% - 100%)    Parameters below as of 08 May 2024 09:52  Patient On (Oxygen Delivery Method): room air        GENERAL: NAD, lying in bed, awake, interactive however, short answers, very confused  CHEST/LUNG: No use of accessory muscles, CTAB, breathing non-labored  COR: RR, no mrcg  ABD: Soft, ND/NT, +BS  PSYCH: AAOxself  SKIN: No rashes or lesions  EXT: wwp, s/p b/l AKA and BKA    LABS:                        10.7   3.13  )-----------( 212      ( 08 May 2024 05:20 )             35.0     05-08    136  |  95<L>  |  16  ----------------------------<  65<L>  4.3   |  26  |  4.10<H>    Ca    8.9      08 May 2024 05:20  Phos  3.6     05-08  Mg     2.70     05-08            Urinalysis Basic - ( 08 May 2024 05:20 )    Color: x / Appearance: x / SG: x / pH: x  Gluc: 65 mg/dL / Ketone: x  / Bili: x / Urobili: x   Blood: x / Protein: x / Nitrite: x   Leuk Esterase: x / RBC: x / WBC x   Sq Epi: x / Non Sq Epi: x / Bacteria: x          RADIOLOGY & ADDITIONAL TESTS:  Results Reviewed:   Imaging Personally Reviewed:  Electrocardiogram Personally Reviewed:    COORDINATION OF CARE:  Care Discussed with Consultants/Other Providers [Y/N]:  Prior or Outpatient Records Reviewed [Y/N]:

## 2024-05-08 NOTE — PROGRESS NOTE ADULT - ASSESSMENT
69-year-old male with PMH end-stage renal on HD via left aVF, CVA, BKA/AKA, functionally quadriplegic, CAD, HTN, HLD, history of O2 as needed presents for evaluation of vomiting.  Brought from HD, started vomiting while receiving treatment.  Patient unable to endorse any specific complaints, per paperwork AO x 1/0 at baseline.  Patient denies chest pain or difficulty breathing, unable to really clarify about abdominal pain. Nephrology consulted for dialysis needs.    A/P   ESRD  Center: [place]  Nephrologist: Dr. Navarro   Access: L AVF  MWF schedule.  Consent obtained from niece, and proxy, Ramonita Vincent via phone [phone]  Pt seen ondialysis tolerating well  Renal diet  Monitor BMP     HTN   BP fluctuating but controlled.  Low salt diet.  C/W current anti- hypertensives.  UF with HD   Monitor BP     Anemia  TAINA w/ HD;   Monitor Hb     Hyperkalemia   s/p HD 5/5, 5/6  HD today  low K diet   monitor     Hyponatremia   UF with HD  monitor     Acidosis   non AG + AG  monitor     CKD-MBD   - acceptable   p04 WNL  Monitor Ca, PO4 daily     Nausea/Vomiting  CT A/P shows constipation and wall thickening, worrisome for stercoral colitis  s/p disimpaction.  Colorectal surgery f/u

## 2024-05-08 NOTE — PROGRESS NOTE ADULT - SUBJECTIVE AND OBJECTIVE BOX
[facility]   MD MARIBELL CARRION MD RUORU WONG, PA    TEL:  FROM 9 AM to 5 PM ---OFFICE: [phone]  AVAILABLE ON TEAMS    FROM 5 PM - 9 AM PLEASE CALL ANSWERING SERVICE: [phone]    RENAL FOLLOW UP NOTE--Date of Service 05-08-24 @ 07:29  --------------------------------------------------------------------------------  HPI:      Pt seen and examined at bedside.   Mireya SOB, chest pain     PAST HISTORY  --------------------------------------------------------------------------------  No significant changes to PMH, PSH, FHx, SHx, unless otherwise noted    ALLERGIES & MEDICATIONS  --------------------------------------------------------------------------------  Allergies    No Known Allergies    Intolerances      Standing Inpatient Medications  albuterol/ipratropium for Nebulization 3 milliLiter(s) Nebulizer every 6 hours  ascorbic acid 500 milliGRAM(s) Oral daily  aspirin enteric coated 81 milliGRAM(s) Oral daily  atorvastatin 40 milliGRAM(s) Oral at bedtime  carvedilol 6.25 milliGRAM(s) Oral every 12 hours  chlorhexidine 2% Cloths 1 Application(s) Topical daily  dextrose 50% Injectable 25 Gram(s) IV Push once  dextrose 50% Injectable 25 Gram(s) IV Push once  dextrose 50% Injectable 12.5 Gram(s) IV Push once  dextrose Oral Gel 15 Gram(s) Oral once  glucagon  Injectable 1 milliGRAM(s) IntraMuscular once  heparin   Injectable 5000 Unit(s) SubCutaneous every 12 hours  lactobacillus acidophilus 1 Tablet(s) Oral daily  lactulose Syrup 10 Gram(s) Oral daily  Nephro-hannah 1 Tablet(s) Oral daily  pantoprazole    Tablet 40 milliGRAM(s) Oral before breakfast  polyethylene glycol 3350 17 Gram(s) Oral daily  senna 2 Tablet(s) Oral at bedtime  sodium chloride 3%  Inhalation 4 milliLiter(s) Inhalation every 6 hours    PRN Inpatient Medications  acetaminophen     Tablet .. 650 milliGRAM(s) Oral every 6 hours PRN  aluminum hydroxide/magnesium hydroxide/simethicone Suspension 30 milliLiter(s) Oral every 4 hours PRN  melatonin 3 milliGRAM(s) Oral at bedtime PRN  ondansetron Injectable 4 milliGRAM(s) IV Push every 8 hours PRN  oxyCODONE    IR 5 milliGRAM(s) Oral every 6 hours PRN  sodium chloride 0.9% Bolus. 100 milliLiter(s) IV Bolus every 5 minutes PRN      REVIEW OF SYSTEMS  --------------------------------------------------------------------------------  General: no fever  CVS: no chest pain  RESP: no sob, no cough  ABD: no abdominal pain  : no dysuria,  MSK: no edema     VITALS/PHYSICAL EXAM  --------------------------------------------------------------------------------  T(C): 37.1 (05-08-24 @ 06:00), Max: 37.1 (05-08-24 @ 06:00)  HR: 72 (05-08-24 @ 06:00) (67 - 98)  BP: 114/63 (05-08-24 @ 06:00) (114/63 - 137/45)  RR: 18 (05-08-24 @ 06:00) (18 - 18)  SpO2: 100% (05-08-24 @ 06:00) (98% - 100%)  Wt(kg): --        Physical Exam:  	Gen: NAD  	HEENT: MMM  	Pulm: CTA B/L  	CV: S1S2  	Abd: Soft, +BS  	Ext: No LE edema B/L                      Neuro: Awake   	Skin: Warm and Dry   	Vascular access: avf         LABS/STUDIES  --------------------------------------------------------------------------------              10.7   3.13  >-----------<  212      [05-08-24 @ 05:20]              35.0     136  |  96  |  10  ----------------------------<  42      [05-07-24 @ 04:54]  4.1   |  25  |  2.69        Ca     8.5     [05-07-24 @ 04:54]      Mg     2.40     [05-07-24 @ 04:54]      Phos  3.0     [05-07-24 @ 04:54]            Creatinine Trend:  SCr 2.69 [05-07 @ 04:54]  SCr 3.73 [05-06 @ 11:05]  SCr 3.28 [05-06 @ 06:30]  SCr 2.70 [05-05 @ 19:44]  SCr 4.67 [05-05 @ 05:29]    Urinalysis - [05-07-24 @ 04:54]      Color  / Appearance  / SG  / pH       Gluc 42 / Ketone   / Bili  / Urobili        Blood  / Protein  / Leuk Est  / Nitrite       RBC  / WBC  / Hyaline  / Gran  / Sq Epi  / Non Sq Epi  / Bacteria       PTH -- (Ca --)      [04-28-24 @ 05:00]   155  HbA1c 4.2      [07-19-19 @ 09:56]  TSH 1.96      [05-07-24 @ 04:54]  Lipid: chol 119, TG 66, HDL 46, LDL --      [10-07-23 @ 09:30]    HBsAb 204.3      [05-03-24 @ 11:25]  HBsAg Nonreact      [05-04-24 @ 16:30]  HBcAb Nonreact      [05-03-24 @ 11:25]  HCV 0.12, Nonreact      [05-04-24 @ 16:30]

## 2024-05-09 LAB
ADD ON TEST-SPECIMEN IN LAB: SIGNIFICANT CHANGE UP
ALBUMIN SERPL ELPH-MCNC: 3 G/DL — LOW (ref 3.3–5)
ALP SERPL-CCNC: 85 U/L — SIGNIFICANT CHANGE UP (ref 40–120)
ALT FLD-CCNC: 9 U/L — SIGNIFICANT CHANGE UP (ref 4–41)
ANION GAP SERPL CALC-SCNC: 11 MMOL/L — SIGNIFICANT CHANGE UP (ref 7–14)
AST SERPL-CCNC: 23 U/L — SIGNIFICANT CHANGE UP (ref 4–40)
BILIRUB DIRECT SERPL-MCNC: <0.2 MG/DL — SIGNIFICANT CHANGE UP (ref 0–0.3)
BILIRUB INDIRECT FLD-MCNC: >0 MG/DL — SIGNIFICANT CHANGE UP (ref 0–1)
BILIRUB SERPL-MCNC: 0.2 MG/DL — SIGNIFICANT CHANGE UP (ref 0.2–1.2)
BUN SERPL-MCNC: 10 MG/DL — SIGNIFICANT CHANGE UP (ref 7–23)
CALCIUM SERPL-MCNC: 8.9 MG/DL — SIGNIFICANT CHANGE UP (ref 8.4–10.5)
CHLORIDE SERPL-SCNC: 97 MMOL/L — LOW (ref 98–107)
CO2 SERPL-SCNC: 28 MMOL/L — SIGNIFICANT CHANGE UP (ref 22–31)
CORTIS AM PEAK SERPL-MCNC: 6.5 UG/DL — SIGNIFICANT CHANGE UP (ref 6–18.4)
CREAT SERPL-MCNC: 3.14 MG/DL — HIGH (ref 0.5–1.3)
EGFR: 21 ML/MIN/1.73M2 — LOW
GLUCOSE BLDC GLUCOMTR-MCNC: 131 MG/DL — HIGH (ref 70–99)
GLUCOSE BLDC GLUCOMTR-MCNC: 84 MG/DL — SIGNIFICANT CHANGE UP (ref 70–99)
GLUCOSE BLDC GLUCOMTR-MCNC: 84 MG/DL — SIGNIFICANT CHANGE UP (ref 70–99)
GLUCOSE BLDC GLUCOMTR-MCNC: 87 MG/DL — SIGNIFICANT CHANGE UP (ref 70–99)
GLUCOSE SERPL-MCNC: 59 MG/DL — LOW (ref 70–99)
HCT VFR BLD CALC: 35.8 % — LOW (ref 39–50)
HGB BLD-MCNC: 11.1 G/DL — LOW (ref 13–17)
MAGNESIUM SERPL-MCNC: 2.5 MG/DL — SIGNIFICANT CHANGE UP (ref 1.6–2.6)
MCHC RBC-ENTMCNC: 26.8 PG — LOW (ref 27–34)
MCHC RBC-ENTMCNC: 31 GM/DL — LOW (ref 32–36)
MCV RBC AUTO: 86.5 FL — SIGNIFICANT CHANGE UP (ref 80–100)
NRBC # BLD: 0 /100 WBCS — SIGNIFICANT CHANGE UP (ref 0–0)
NRBC # FLD: 0 K/UL — SIGNIFICANT CHANGE UP (ref 0–0)
PHOSPHATE SERPL-MCNC: 2.8 MG/DL — SIGNIFICANT CHANGE UP (ref 2.5–4.5)
PLATELET # BLD AUTO: 210 K/UL — SIGNIFICANT CHANGE UP (ref 150–400)
POTASSIUM SERPL-MCNC: 4.1 MMOL/L — SIGNIFICANT CHANGE UP (ref 3.5–5.3)
POTASSIUM SERPL-SCNC: 4.1 MMOL/L — SIGNIFICANT CHANGE UP (ref 3.5–5.3)
PROT SERPL-MCNC: 7.3 G/DL — SIGNIFICANT CHANGE UP (ref 6–8.3)
RBC # BLD: 4.14 M/UL — LOW (ref 4.2–5.8)
RBC # FLD: 19.1 % — HIGH (ref 10.3–14.5)
SODIUM SERPL-SCNC: 136 MMOL/L — SIGNIFICANT CHANGE UP (ref 135–145)
WBC # BLD: 2.89 K/UL — LOW (ref 3.8–10.5)
WBC # FLD AUTO: 2.89 K/UL — LOW (ref 3.8–10.5)

## 2024-05-09 PROCEDURE — 99232 SBSQ HOSP IP/OBS MODERATE 35: CPT

## 2024-05-09 RX ORDER — OXYCODONE HYDROCHLORIDE 5 MG/1
5 TABLET ORAL EVERY 6 HOURS
Refills: 0 | Status: DISCONTINUED | OUTPATIENT
Start: 2024-05-09 | End: 2024-05-16

## 2024-05-09 RX ORDER — VANCOMYCIN HCL 1 G
500 VIAL (EA) INTRAVENOUS
Qty: 7 | Refills: 0
Start: 2024-05-09 | End: 2024-05-15

## 2024-05-09 RX ADMIN — CARVEDILOL PHOSPHATE 6.25 MILLIGRAM(S): 80 CAPSULE, EXTENDED RELEASE ORAL at 17:17

## 2024-05-09 RX ADMIN — ATORVASTATIN CALCIUM 40 MILLIGRAM(S): 80 TABLET, FILM COATED ORAL at 21:18

## 2024-05-09 RX ADMIN — Medication 81 MILLIGRAM(S): at 11:09

## 2024-05-09 RX ADMIN — Medication 3 MILLILITER(S): at 08:38

## 2024-05-09 RX ADMIN — SENNA PLUS 2 TABLET(S): 8.6 TABLET ORAL at 21:19

## 2024-05-09 RX ADMIN — PANTOPRAZOLE SODIUM 40 MILLIGRAM(S): 20 TABLET, DELAYED RELEASE ORAL at 06:03

## 2024-05-09 RX ADMIN — Medication 3 MILLIGRAM(S): at 21:19

## 2024-05-09 RX ADMIN — CHLORHEXIDINE GLUCONATE 1 APPLICATION(S): 213 SOLUTION TOPICAL at 11:09

## 2024-05-09 RX ADMIN — Medication 3 MILLILITER(S): at 19:44

## 2024-05-09 RX ADMIN — LACTULOSE 10 GRAM(S): 10 SOLUTION ORAL at 11:08

## 2024-05-09 RX ADMIN — Medication 1 TABLET(S): at 11:08

## 2024-05-09 RX ADMIN — HEPARIN SODIUM 5000 UNIT(S): 5000 INJECTION INTRAVENOUS; SUBCUTANEOUS at 17:17

## 2024-05-09 RX ADMIN — POLYETHYLENE GLYCOL 3350 17 GRAM(S): 17 POWDER, FOR SOLUTION ORAL at 11:08

## 2024-05-09 RX ADMIN — HEPARIN SODIUM 5000 UNIT(S): 5000 INJECTION INTRAVENOUS; SUBCUTANEOUS at 06:03

## 2024-05-09 RX ADMIN — CARVEDILOL PHOSPHATE 6.25 MILLIGRAM(S): 80 CAPSULE, EXTENDED RELEASE ORAL at 06:03

## 2024-05-09 RX ADMIN — SODIUM CHLORIDE 4 MILLILITER(S): 9 INJECTION INTRAMUSCULAR; INTRAVENOUS; SUBCUTANEOUS at 19:44

## 2024-05-09 RX ADMIN — Medication 3 MILLILITER(S): at 04:49

## 2024-05-09 RX ADMIN — Medication 500 MILLIGRAM(S): at 11:09

## 2024-05-09 RX ADMIN — Medication 3 MILLILITER(S): at 15:22

## 2024-05-09 RX ADMIN — SODIUM CHLORIDE 4 MILLILITER(S): 9 INJECTION INTRAMUSCULAR; INTRAVENOUS; SUBCUTANEOUS at 04:49

## 2024-05-09 NOTE — PROGRESS NOTE ADULT - ASSESSMENT
69-year-old male with PMH end-stage renal on HD via left aVF, CVA, BKA/AKA, functionally quadriplegic, CAD, HTN, HLD, history of O2 as needed presents for evaluation of vomiting.  Brought from HD, started vomiting while receiving treatment.  Patient unable to endorse any specific complaints, per paperwork AO x 1/0 at baseline.  Patient denies chest pain or difficulty breathing, unable to really clarify about abdominal pain. Nephrology consulted for dialysis needs.    A/P   ESRD  Center: [facility]  Nephrologist: Dr. Navarro   Access: L AVF  MWF schedule.  Consent obtained from niece, and proxy, Ramonita Vincent via phone [phone]  s/p HD 5/5 due high K+    s/p HD 5/6 .  Plan for HD tomorrow.  C/W MWF schedule inpatient.  Renal diet  Monitor BMP     HTN   BP fluctuating but controlled.  Low salt diet.  C/W current anti- hypertensives.  UF with HD   Monitor BP     Anemia  above goal.  TAINA w/ HD; hold for Hgb >11.  Monitor Hb     Hyperkalemia   s/p HD 5/5, 5/6  repeat K better  low K diet   monitor     Hyponatremia   s/p HD 5/6  better  Fluid restriction to 1L/day.  monitor     Acidosis   non AG + AG  Improved w/ HD.  monitor     CKD-MBD   - acceptable   PO4 WNL  Off binder.  Monitor Ca, PO4 daily     Nausea/Vomiting  CT A/P shows constipation and wall thickening, worrisome for stercoral colitis  s/p disimpaction.  Colorectal surgery f/u

## 2024-05-09 NOTE — PROGRESS NOTE ADULT - PROBLEM SELECTOR PLAN 1
Due to MRSA bacteremia  -unclear source of MRSA infection  -CT chest without obvious PNA, did mention a masslike consolidation in LLL most suggestive of atelectasis  -urine culture with no growth  -ID following, appreciate recs  -c/w vancomycin 1000mg after HD on HD days M/W/F; will need through 5/27  -ISRAEL w/o vegetation, but noted reduced global hypokinesis Due to MRSA bacteremia  -unclear source of MRSA infection  -CT chest without obvious PNA, did mention a masslike consolidation in LLL most suggestive of atelectasis  -urine culture with no growth  -ID following, appreciate recs  -c/w vancomycin after HD on HD days M/W/F; will need through 5/27  -ISRAEL w/o vegetation, but noted reduced global hypokinesis

## 2024-05-09 NOTE — CHART NOTE - NSCHARTNOTEFT_GEN_A_CORE
Brief Endocrinology Note:    This is a 70 yo M /w a AxOx1, CKD4 multiple amputations here with stercolitis and MRSA bacteremia. Endocrinology consulted for hypoglycemia.     Has had serum glucose in the 40s, though has not had glucose <55 since May 7 at which time serum glucose was 42    Patient noted to not be eating much. He has CKD4 and an ongoing infection    Suspect that this patient with CKD4 has impaired gluconeogenesis in combination with poor glycogen stores (given low body weight) and is not eating and has an infection. All of these things can lead to hypoglycemia. Low suspicion for adrenal insufficiency and the cortisol of 6 at ~5AM is not interpretable.    Recommendations:  Please note that hypoglycemia is defined as Whipple's triad: serum glucose <55, with symptoms of hypoglycemia, that improve with glucose/dextrose, and at this time it is unclear if he has been meeting those criteria, which may be hard to tell if patient has dementia at baseline.  -please repeat AM cortisol and ACTH at 8AM tomorrow  -ensure hypoglycemia protocol is in place  -if the patient has FSG <60 can draw the following labs PRIOR to treating with dextrose  1) serum insulin  2) BMP  3) proinsulin  4) BHB  5) c-peptide  -please encourage oral intake to prevent hypoglycemia    Full consult tomorrow    Discussed with primary team    Jero Chand MD  Endocrine Fellow  Can be reached via teams. For follow up questions, discharge recommendations, or new consults, please call answering service at [phone] (weekdays); [phone] (nights/weekends)

## 2024-05-09 NOTE — PROGRESS NOTE ADULT - SUBJECTIVE AND OBJECTIVE BOX
[facility]   MD MARIBELL CARRION MD ANGELA WONG, PA QIAN CHEN, SHAVONNE    TEL:  OFFICE: [phone]  From 5pm-7am Answering Service [phone]    -- RENAL FOLLOW UP NOTE ---Date of Service 05-09-24 @ 18:45    Patient is a 69y old  Male who presents with a chief complaint of nausea and vomiting.    Patient seen and examined at bedside. No chest pain/SOB.    VITALS:  T(F): 97.7 (05-09-24 @ 17:31), Max: 97.7 (05-09-24 @ 17:31)  HR: 66 (05-09-24 @ 17:31)  BP: 125/65 (05-09-24 @ 17:31)  RR: 18 (05-09-24 @ 17:31)  SpO2: 100% (05-09-24 @ 17:31)  Wt(kg): --    05-08 @ 07:01  -  05-09 @ 07:00  --------------------------------------------------------  IN: 400 mL / OUT: 1300 mL / NET: -900 mL      PHYSICAL EXAM:  General: NAD  Neck: No JVD  Respiratory: CTAB, no wheezes, rales or rhonchi  Cardiovascular: S1, S2, RRR  Gastrointestinal: BS+, soft, NT/ND  Extremities: No peripheral edema    Hospital Medications:   MEDICATIONS  (STANDING):  albuterol/ipratropium for Nebulization 3 milliLiter(s) Nebulizer every 6 hours  ascorbic acid 500 milliGRAM(s) Oral daily  aspirin enteric coated 81 milliGRAM(s) Oral daily  atorvastatin 40 milliGRAM(s) Oral at bedtime  carvedilol 6.25 milliGRAM(s) Oral every 12 hours  chlorhexidine 2% Cloths 1 Application(s) Topical daily  dextrose 50% Injectable 25 Gram(s) IV Push once  dextrose 50% Injectable 25 Gram(s) IV Push once  dextrose 50% Injectable 12.5 Gram(s) IV Push once  dextrose Oral Gel 15 Gram(s) Oral once  glucagon  Injectable 1 milliGRAM(s) IntraMuscular once  heparin   Injectable 5000 Unit(s) SubCutaneous every 12 hours  lactobacillus acidophilus 1 Tablet(s) Oral daily  lactulose Syrup 10 Gram(s) Oral daily  Nephro-hannah 1 Tablet(s) Oral daily  pantoprazole    Tablet 40 milliGRAM(s) Oral before breakfast  polyethylene glycol 3350 17 Gram(s) Oral daily  senna 2 Tablet(s) Oral at bedtime  sodium chloride 3%  Inhalation 4 milliLiter(s) Inhalation every 6 hours      LABS:  05-09    136  |  97<L>  |  10  ----------------------------<  59<L>  4.1   |  28  |  3.14<H>    Ca    8.9      09 May 2024 04:48  Phos  2.8     05-09  Mg     2.50     05-09    TPro  7.3  /  Alb  3.0<L>  /  TBili  0.2  /  DBili  <0.2  /  AST  23  /  ALT  9   /  AlkPhos  85  05-09    Creatinine Trend: 3.14 <--, 4.10 <--, 2.69 <--, 3.73 <--, 3.28 <--, 2.70 <--, 4.67 <--, 4.43 <--, 4.15 <--, 4.13 <--, 3.04 <--, 5.16 <--    Phosphorus: 2.8 mg/dL (05-09 @ 04:48)  Albumin: 3.0 g/dL (05-09 @ 04:48)                 11.1   2.89  )-----------( 210      ( 09 May 2024 04:48 )             35.8     Urine Studies:  Urinalysis - [05-09-24 @ 04:48]      Color  / Appearance  / SG  / pH       Gluc 59 / Ketone   / Bili  / Urobili        Blood  / Protein  / Leuk Est  / Nitrite       RBC  / WBC  / Hyaline  / Gran  / Sq Epi  / Non Sq Epi  / Bacteria       PTH -- (Ca --)      [04-28-24 @ 05:00]   155  HbA1c 4.2      [07-19-19 @ 09:56]  TSH 1.96      [05-07-24 @ 04:54]  Lipid: chol 119, TG 66, HDL 46, LDL --      [10-07-23 @ 09:30]    HBsAb 204.3      [05-03-24 @ 11:25]  HBsAg Nonreact      [05-04-24 @ 16:30]  HBcAb Nonreact      [05-03-24 @ 11:25]  HCV 0.12, Nonreact      [05-04-24 @ 16:30]

## 2024-05-10 LAB
ANION GAP SERPL CALC-SCNC: 14 MMOL/L — SIGNIFICANT CHANGE UP (ref 7–14)
BUN SERPL-MCNC: 18 MG/DL — SIGNIFICANT CHANGE UP (ref 7–23)
CALCIUM SERPL-MCNC: 9 MG/DL — SIGNIFICANT CHANGE UP (ref 8.4–10.5)
CHLORIDE SERPL-SCNC: 93 MMOL/L — LOW (ref 98–107)
CO2 SERPL-SCNC: 25 MMOL/L — SIGNIFICANT CHANGE UP (ref 22–31)
CORTIS AM PEAK SERPL-MCNC: 13.1 UG/DL — SIGNIFICANT CHANGE UP (ref 6–18.4)
CREAT SERPL-MCNC: 4.22 MG/DL — HIGH (ref 0.5–1.3)
EGFR: 14 ML/MIN/1.73M2 — LOW
GLUCOSE BLDC GLUCOMTR-MCNC: 103 MG/DL — HIGH (ref 70–99)
GLUCOSE BLDC GLUCOMTR-MCNC: 124 MG/DL — HIGH (ref 70–99)
GLUCOSE BLDC GLUCOMTR-MCNC: 146 MG/DL — HIGH (ref 70–99)
GLUCOSE BLDC GLUCOMTR-MCNC: 275 MG/DL — HIGH (ref 70–99)
GLUCOSE BLDC GLUCOMTR-MCNC: 67 MG/DL — LOW (ref 70–99)
GLUCOSE BLDC GLUCOMTR-MCNC: 67 MG/DL — LOW (ref 70–99)
GLUCOSE BLDC GLUCOMTR-MCNC: 88 MG/DL — SIGNIFICANT CHANGE UP (ref 70–99)
GLUCOSE SERPL-MCNC: 49 MG/DL — CRITICAL LOW (ref 70–99)
HCT VFR BLD CALC: 34.9 % — LOW (ref 39–50)
HGB BLD-MCNC: 10.9 G/DL — LOW (ref 13–17)
MAGNESIUM SERPL-MCNC: 2.7 MG/DL — HIGH (ref 1.6–2.6)
MCHC RBC-ENTMCNC: 27 PG — SIGNIFICANT CHANGE UP (ref 27–34)
MCHC RBC-ENTMCNC: 31.2 GM/DL — LOW (ref 32–36)
MCV RBC AUTO: 86.4 FL — SIGNIFICANT CHANGE UP (ref 80–100)
NRBC # BLD: 0 /100 WBCS — SIGNIFICANT CHANGE UP (ref 0–0)
NRBC # FLD: 0 K/UL — SIGNIFICANT CHANGE UP (ref 0–0)
PHOSPHATE SERPL-MCNC: 3.4 MG/DL — SIGNIFICANT CHANGE UP (ref 2.5–4.5)
PLATELET # BLD AUTO: 200 K/UL — SIGNIFICANT CHANGE UP (ref 150–400)
POTASSIUM SERPL-MCNC: 5.2 MMOL/L — SIGNIFICANT CHANGE UP (ref 3.5–5.3)
POTASSIUM SERPL-SCNC: 5.2 MMOL/L — SIGNIFICANT CHANGE UP (ref 3.5–5.3)
RBC # BLD: 4.04 M/UL — LOW (ref 4.2–5.8)
RBC # FLD: 18.8 % — HIGH (ref 10.3–14.5)
SODIUM SERPL-SCNC: 132 MMOL/L — LOW (ref 135–145)
VANCOMYCIN TROUGH SERPL-MCNC: 17.5 UG/ML — SIGNIFICANT CHANGE UP (ref 10–20)
VANCOMYCIN TROUGH SERPL-MCNC: 20.9 UG/ML — HIGH (ref 10–20)
WBC # BLD: 2.9 K/UL — LOW (ref 3.8–10.5)
WBC # FLD AUTO: 2.9 K/UL — LOW (ref 3.8–10.5)

## 2024-05-10 PROCEDURE — 99232 SBSQ HOSP IP/OBS MODERATE 35: CPT

## 2024-05-10 PROCEDURE — 99254 IP/OBS CNSLTJ NEW/EST MOD 60: CPT

## 2024-05-10 RX ORDER — VANCOMYCIN HCL 1 G
500 VIAL (EA) INTRAVENOUS ONCE
Refills: 0 | Status: COMPLETED | OUTPATIENT
Start: 2024-05-10 | End: 2024-05-10

## 2024-05-10 RX ORDER — DEXTROSE 50 % IN WATER 50 %
25 SYRINGE (ML) INTRAVENOUS ONCE
Refills: 0 | Status: COMPLETED | OUTPATIENT
Start: 2024-05-10 | End: 2024-05-10

## 2024-05-10 RX ORDER — ERYTHROPOIETIN 10000 [IU]/ML
10000 INJECTION, SOLUTION INTRAVENOUS; SUBCUTANEOUS
Refills: 0 | Status: DISCONTINUED | OUTPATIENT
Start: 2024-05-13 | End: 2024-05-16

## 2024-05-10 RX ADMIN — HEPARIN SODIUM 5000 UNIT(S): 5000 INJECTION INTRAVENOUS; SUBCUTANEOUS at 05:16

## 2024-05-10 RX ADMIN — Medication 1 TABLET(S): at 13:27

## 2024-05-10 RX ADMIN — SODIUM CHLORIDE 4 MILLILITER(S): 9 INJECTION INTRAMUSCULAR; INTRAVENOUS; SUBCUTANEOUS at 03:45

## 2024-05-10 RX ADMIN — Medication 3 MILLILITER(S): at 08:58

## 2024-05-10 RX ADMIN — Medication 81 MILLIGRAM(S): at 13:27

## 2024-05-10 RX ADMIN — SENNA PLUS 2 TABLET(S): 8.6 TABLET ORAL at 21:17

## 2024-05-10 RX ADMIN — HEPARIN SODIUM 5000 UNIT(S): 5000 INJECTION INTRAVENOUS; SUBCUTANEOUS at 17:23

## 2024-05-10 RX ADMIN — ATORVASTATIN CALCIUM 40 MILLIGRAM(S): 80 TABLET, FILM COATED ORAL at 21:17

## 2024-05-10 RX ADMIN — CARVEDILOL PHOSPHATE 6.25 MILLIGRAM(S): 80 CAPSULE, EXTENDED RELEASE ORAL at 05:15

## 2024-05-10 RX ADMIN — Medication 25 GRAM(S): at 07:36

## 2024-05-10 RX ADMIN — Medication 500 MILLIGRAM(S): at 13:27

## 2024-05-10 RX ADMIN — PANTOPRAZOLE SODIUM 40 MILLIGRAM(S): 20 TABLET, DELAYED RELEASE ORAL at 05:15

## 2024-05-10 RX ADMIN — SODIUM CHLORIDE 4 MILLILITER(S): 9 INJECTION INTRAMUSCULAR; INTRAVENOUS; SUBCUTANEOUS at 08:58

## 2024-05-10 RX ADMIN — LACTULOSE 10 GRAM(S): 10 SOLUTION ORAL at 13:27

## 2024-05-10 RX ADMIN — Medication 3 MILLILITER(S): at 03:44

## 2024-05-10 RX ADMIN — SODIUM CHLORIDE 4 MILLILITER(S): 9 INJECTION INTRAMUSCULAR; INTRAVENOUS; SUBCUTANEOUS at 20:31

## 2024-05-10 RX ADMIN — Medication 3 MILLILITER(S): at 20:31

## 2024-05-10 RX ADMIN — CARVEDILOL PHOSPHATE 6.25 MILLIGRAM(S): 80 CAPSULE, EXTENDED RELEASE ORAL at 17:23

## 2024-05-10 RX ADMIN — Medication 100 MILLIGRAM(S): at 17:22

## 2024-05-10 RX ADMIN — POLYETHYLENE GLYCOL 3350 17 GRAM(S): 17 POWDER, FOR SOLUTION ORAL at 13:27

## 2024-05-10 NOTE — CONSULT NOTE ADULT - ASSESSMENT
This is a 68 yo M /w ESRD, prior CVA, BKA and AKA, functional quadriplegia and dementia (AXOX1 at baseline) who presents with abdominal pain and found to have stercolitis and MRSA bacteremia. Patient has been having hypoglycemia on multiple occaisions with glucose <55. Unclear if he has had symptoms previously due to his underlying dementia. Most episodes occur in early AM around 4:45    #hypoglycemia  Suspect that this patient with CKD4 has impaired gluconeogenesis in combination with poor glycogen stores (given low body weight) and is not eating and has an infection. All of these things can lead to hypoglycemia. Low suspicion for adrenal insufficiency and the cortisol of 6 at ~5AM is not interpretable.    Recommendations:  Please note that hypoglycemia is defined as Whipple's triad: serum glucose <55, with symptoms of hypoglycemia, that improve with glucose/dextrose, and at this time it is unclear if he has been meeting those criteria, which may be hard to tell if patient has dementia at baseline.  -please repeat AM cortisol and ACTH at 8AM tomorrow  -ensure hypoglycemia protocol is in place  -if the patient has FSG <60 can draw the following labs PRIOR to treating with dextrose  1) serum insulin  2) BMP  3) proinsulin  4) BHB  5) c-peptide  -please encourage oral intake to prevent hypoglycemia  -FSG q4h     Case discussed with Dr. Jonathan Chand MD  Endocrine Fellow  Can be reached via teams. For follow up questions, discharge recommendations, or new consults, please call answering service at [phone] (weekdays); [phone] (nights/weekends) This is a 68 yo M /w ESRD, prior CVA, BKA and AKA, functional quadriplegia and dementia (AXOX1 at baseline) who presents with abdominal pain and found to have stercolitis and MRSA bacteremia. Patient has been having hypoglycemia on multiple occaisions with glucose <55. Unclear if he has had symptoms previously due to his underlying dementia. Most episodes occur in early AM around 4:45    #hypoglycemia  Suspect that this patient with ESRD has impaired gluconeogenesis in combination with poor glycogen stores (given low body weight) and is not eating and has an infection. All of these things can lead to hypoglycemia. Low suspicion for adrenal insufficiency and the cortisol of 6 at ~5AM is not interpretable.    Recommendations:  Please note that hypoglycemia is defined as Whipple's triad: serum glucose <55, with symptoms of hypoglycemia, that improve with glucose/dextrose, and at this time it is unclear if he has been meeting those criteria, which may be hard to tell if patient has dementia at baseline.  -please repeat AM cortisol and ACTH at 8AM tomorrow  -ensure hypoglycemia protocol is in place  -if the patient has FSG <60 can draw the following labs PRIOR to treating with dextrose  1) serum insulin  2) BMP  3) proinsulin  4) BHB  5) c-peptide  -please encourage oral intake to prevent hypoglycemia  -FSG q4h     Case discussed with Dr. Jonathan Chand MD  Endocrine Fellow  Can be reached via teams. For follow up questions, discharge recommendations, or new consults, please call answering service at [phone] (weekdays); [phone] (nights/weekends) This is a 70 yo M /w ESRD, prior CVA, BKA and AKA, functional quadriplegia and dementia (AXOX1 at baseline) who presents with abdominal pain and found to have stercolitis and MRSA bacteremia. Patient has been having hypoglycemia on multiple occaisions with glucose <55. Unclear if he has had symptoms previously due to his underlying dementia. Most episodes occur in early AM around 4:45    #hypoglycemia  Suspect that this patient with ESRD has impaired gluconeogenesis in combination with poor glycogen stores (given low body weight) and is not eating and has an infection. All of these things can lead to hypoglycemia  Recommendations:  Please note that hypoglycemia is defined as Whipple's triad: serum glucose <55, with symptoms of hypoglycemia, that improve with glucose/dextrose, and at this time it is unclear if he has been meeting those criteria, which may be hard to tell if patient has dementia at baseline.  -AM serum cortisol is 13, which rules out adrenal insufficiency  -ensure hypoglycemia protocol is in place  -please encourage oral intake to prevent hypoglycemia  -FSG q4h   -do not recommend workup for hyperinsulinemia at this time    Endocrinology will sign off     Please call with any questions    Case discussed with Dr. Jonathan Chand MD  Endocrine Fellow  Can be reached via teams. For follow up questions, discharge recommendations, or new consults, please call answering service at [phone] (weekdays); [phone] (nights/weekends) This is a 70 yo M /w ESRD, prior CVA, BKA and AKA, functional quadriplegia and dementia (AXOX1 at baseline) who presents with abdominal pain and found to have stercolitis and MRSA bacteremia. Patient has been having hypoglycemia on multiple occasions with glucose <55. Unclear if he has had symptoms previously due to his underlying dementia. Most episodes occur in early AM around 4:45    #hypoglycemia  Suspect that this patient with ESRD has impaired gluconeogenesis in combination with poor glycogen stores (given low body weight) and is not eating and has an infection. All of these things can lead to hypoglycemia  Recommendations:  Please note that hypoglycemia is defined as Whipple's triad: serum glucose <55, with symptoms of hypoglycemia, that improve with glucose/dextrose, and at this time it is unclear if he has been meeting those criteria, which may be hard to tell if patient has dementia at baseline.  -AM serum cortisol is 13, which rules out adrenal insufficiency  -ensure hypoglycemia protocol is in place  -please encourage oral intake to prevent hypoglycemia  -FSG q4-6h   -do not recommend workup for hyperinsulinemia at this time    Endocrinology will sign off     Please call with any questions    Case discussed with Dr. Jonathan Chand MD  Endocrine Fellow  Can be reached via teams. For follow up questions, discharge recommendations, or new consults, please call answering service at [phone] (weekdays); [phone] (nights/weekends)

## 2024-05-10 NOTE — PROVIDER CONTACT NOTE (CRITICAL VALUE NOTIFICATION) - TEST AND RESULT REPORTED:
potassium 6.2
Vancomycin trough of 27
Blood culture- growth in monique/aerobic bottle gram + cocci in clusters
potassium 6.5
Critical lab value glucose 42
blood glucose 50; vanco trough 27.9
Blood glucose- 50
Glucose of 49
Positive blood cultures in anaerobic bottle for MRSA
blood cultures- growth in aerobic bottle gram positive cocci in clusters

## 2024-05-10 NOTE — PROVIDER CONTACT NOTE (CRITICAL VALUE NOTIFICATION) - ASSESSMENT
Patient A&Ox1-2, no acute distress noted. VSS; afebrile. Patient on Zosyn and Vancomycin.
Pt AXO2. NS on tele, on room air. No s/s of distress noticed.
patient was resting on bed NPO for ISRAEL, AOx2, no acute distress noted, denied chest pain and SOB. blood sugar recheck 76
Pt asymptomatic on tele NSR. No distress/discomfort noted. Breathing well on 2L NC.
Patient A&Ox1-2, no acute distress noted
Patient A&Ox2, no acute distress noted. Patient drank 4 oz apple juice
Pt AXO0-1, at baseline. FS resulted 67. No s/s of distress noticed.
Pt AXO1-2, confused. NS on tele, on room air. No s/s of distress noticed.
Pt asymptomatic on tele NSR. No distress/discomfort noted. Breathing well on 2L NC.
critical lab: potassium 6.2

## 2024-05-10 NOTE — CONSULT NOTE ADULT - CONSULT REASON
Elevated troponin, LV dilatation on imaging
MRSA bacteremia
dialysis needs
Reduced EF
stercoral colitis
hypoglycemia

## 2024-05-10 NOTE — PROVIDER CONTACT NOTE (CRITICAL VALUE NOTIFICATION) - PERSON GIVING RESULT:
Quynh Hernandez)
BARON Beauchamp ([facility])
[facility] - BARON Saab
Lab - GARLAND Pringle
Ross Bustamante
[place]
BARON Lee ([place])
Yes...
Amy, K
Chemistry lab - Marifer Reyna
SARWAT Almazan

## 2024-05-10 NOTE — CONSULT NOTE ADULT - ATTENDING COMMENTS
The patient was seen on the morning of 4/26/2024.  Please see my addendum to the note for that date.    Do not bill.    Osmin Ruby MD [facility] FACP  Cardiology  [phone]
Agree with above, plan for GDMT. Would defer Entresto for now given ESRD and start losartan 25 mg daily.  Should f/u with heart failure clinic at [facility].
Pt disimpacted in the ED  Recommend daily bowel regimen of stool softeners and laxatives  If continues concern for colitis can consider cipro and flagyl  Advance diet as tolerated  Other management per primary team    Lottie Valadez MD  Attending Physician
This is a 69 M w/ PMhx of ESRD via L AVF, CVA, R BKA, L AKA, bedbound, CAD, HTN, who is presenting to [facility] on 4/25/24 with vomiting.   In the ED, pt was febrile to 101.1, hypotensive to 66/42, hypoxic requiring 5L NC. Labs w/ mild leukocytosis to 11. Lactate 2.2, BCx w/ 4/4 MRSA.     #MRSA bacteremia, high grade  #Septic shock  #Lactic acidosis  #ESRD on HD via L AVF     Overall, 69 M w/ PMhx of ESRD via L AVF, CVA, R BKA, L AKA, bedbound, CAD, HTN admitted with high grade MRSA bacteremia, with no clear source.   CT A/P with stercoral colitis, lower chest with b/l PLEFF, L > R, possible consolidation?  Awaiting CT Chest, TTE/ISRAEL. Source may be PNA? unclear at this time, if bacteremia is persistent without clear source may need NM scan.     Plan:   1. C/w Vancomycin 1 g after HD, obtain trough prior to HD   2. Repeat BCx every 48 hours until negative   3. Obtain TTE, however will need ISRAEL to r/o IE   4. VA duplex of AVF wnl   5. Pending CT chest     Thank you for this consult. Inpatient ID team will follow.    Hector Umanzor M.D.  Attending Physician  Division of Infectious Diseases  Department of Medicine    Please contact through MS Teams message.  Office: [phone] (after 5 PM or weekend)
70 yo M /w ESRD, prior CVA, BKA and AKA, functional quadriplegia and dementia with MRSA bacteremia, with hypoglycemia. Unable to confirm whipple's triad with baseline mental status. Has had serum glucoses <55 which is most likely multifactorial from poor PO intake, malnutrition, infection, impaired gluconeogenesis and low glucose stores. Repeat AM cortisol is normal without clinical concern for adrenal insufficiency. Would defer hypoglycemia workup as an underlying endocrinopathy is unlikely, and instead would focus on optimizing nutrition and continue infection management.

## 2024-05-10 NOTE — PROVIDER CONTACT NOTE (CRITICAL VALUE NOTIFICATION) - RECOMMENDATIONS
ACP notified and aware, blood sugar to be checked by fingertip to confirm.
EKG, ordered medications given. pre/post FS performed. will continue to monitor pt.
Continue antibiotics
Neelima Narayan notified.
EMILIA Walker notified over the phone. No further orders at this time.
provider aware
Continue abtx
provider aware
EMILIA Ellis notified over the phone. FS resulted 67.

## 2024-05-10 NOTE — PROGRESS NOTE ADULT - SUBJECTIVE AND OBJECTIVE BOX
[facility] [place] NEPHROLOGY PRACTICE   MD MARIBELL CARRION MD ANGELA WONG, PA QIAN CHEN, NP    TEL:  OFFICE: [phone]  From 5pm-7am Answering Service [phone]    -- RENAL FOLLOW UP NOTE ---Date of Service 05-10-24 @ 18:02    Patient is a 69y old  Male who presents with a chief complaint of nausea and vomiting.    Patient seen and examined at bedside. No chest pain/SOB.    VITALS:  T(F): 97.2 (05-10-24 @ 13:05), Max: 98.5 (05-09-24 @ 21:15)  HR: 68 (05-10-24 @ 13:05)  BP: 121/58 (05-10-24 @ 13:05)  RR: 17 (05-10-24 @ 13:05)  SpO2: 100% (05-10-24 @ 09:20)  Wt(kg): --    05-09 @ 07:01  -  05-10 @ 07:00  --------------------------------------------------------  IN: 100 mL / OUT: 0 mL / NET: 100 mL    05-10 @ 07:01  -  05-10 @ 18:02  --------------------------------------------------------  IN: 400 mL / OUT: 1739 mL / NET: -1339 mL      PHYSICAL EXAM:  General: NAD  Neck: No JVD  Respiratory: CTAB, no wheezes, rales or rhonchi  Cardiovascular: S1, S2, RRR  Gastrointestinal: BS+, soft, NT/ND  Extremities: No peripheral edema    Hospital Medications:   MEDICATIONS  (STANDING):  albuterol/ipratropium for Nebulization 3 milliLiter(s) Nebulizer every 6 hours  ascorbic acid 500 milliGRAM(s) Oral daily  aspirin enteric coated 81 milliGRAM(s) Oral daily  atorvastatin 40 milliGRAM(s) Oral at bedtime  carvedilol 6.25 milliGRAM(s) Oral every 12 hours  chlorhexidine 2% Cloths 1 Application(s) Topical daily  dextrose 50% Injectable 25 Gram(s) IV Push once  dextrose 50% Injectable 12.5 Gram(s) IV Push once  dextrose 50% Injectable 25 Gram(s) IV Push once  dextrose Oral Gel 15 Gram(s) Oral once  glucagon  Injectable 1 milliGRAM(s) IntraMuscular once  heparin   Injectable 5000 Unit(s) SubCutaneous every 12 hours  lactobacillus acidophilus 1 Tablet(s) Oral daily  lactulose Syrup 10 Gram(s) Oral daily  Nephro-hannah 1 Tablet(s) Oral daily  pantoprazole    Tablet 40 milliGRAM(s) Oral before breakfast  polyethylene glycol 3350 17 Gram(s) Oral daily  senna 2 Tablet(s) Oral at bedtime  sodium chloride 3%  Inhalation 4 milliLiter(s) Inhalation every 6 hours      LABS:  05-10    132<L>  |  93<L>  |  18  ----------------------------<  49<LL>  5.2   |  25  |  4.22<H>    Ca    9.0      10 May 2024 04:58  Phos  3.4     05-10  Mg     2.70     05-10    TPro  7.3  /  Alb  3.0<L>  /  TBili  0.2  /  DBili  <0.2  /  AST  23  /  ALT  9   /  AlkPhos  85  05-09    Creatinine Trend: 4.22 <--, 3.14 <--, 4.10 <--, 2.69 <--, 3.73 <--, 3.28 <--, 2.70 <--, 4.67 <--, 4.43 <--, 4.15 <--, 4.13 <--    Phosphorus: 3.4 mg/dL (05-10 @ 04:58)                       10.9   2.90  )-----------( 200      ( 10 May 2024 04:58 )             34.9     Urine Studies:  Urinalysis - [05-10-24 @ 04:58]      Color  / Appearance  / SG  / pH       Gluc 49 / Ketone   / Bili  / Urobili        Blood  / Protein  / Leuk Est  / Nitrite       RBC  / WBC  / Hyaline  / Gran  / Sq Epi  / Non Sq Epi  / Bacteria       PTH -- (Ca --)      [04-28-24 @ 05:00]   155  HbA1c 4.2      [07-19-19 @ 09:56]  TSH 1.96      [05-07-24 @ 04:54]  Lipid: chol 119, TG 66, HDL 46, LDL --      [10-07-23 @ 09:30]    HBsAb 204.3      [05-03-24 @ 11:25]  HBsAg Nonreact      [05-04-24 @ 16:30]  HBcAb Nonreact      [05-03-24 @ 11:25]  HCV 0.12, Nonreact      [05-04-24 @ 16:30]

## 2024-05-10 NOTE — PROVIDER CONTACT NOTE (CRITICAL VALUE NOTIFICATION) - ACTION/TREATMENT ORDERED:
Give apple juice and continue to monitor blood glucose before meals
Neelima Narayan notified. Waiting for further orders. Safety maintained.
ACP notified and aware, awaiting new orders at this time.
provider aware, awaiting for response.
EMILIA Walker notified over the phone. No further orders at this time. Will notify day RN. Safety maintained.
EMILIA Ellis notified over the phone. FS resulted 67. Hypoglycemia protocol followed. Safety maintained.
ACP bond notified; continue vanco after HD and trend q48 bcx
ACp made aware.
BECKI Hummel notified; will continue antibiotic course
provider aware

## 2024-05-10 NOTE — PROGRESS NOTE ADULT - ASSESSMENT
69-year-old male with PMH end-stage renal on HD via left aVF, CVA, BKA/AKA, functionally quadriplegic, CAD, HTN, HLD, history of O2 as needed presents for evaluation of vomiting.  Brought from HD, started vomiting while receiving treatment.  Patient unable to endorse any specific complaints, per paperwork AO x 1/0 at baseline.  Patient denies chest pain or difficulty breathing, unable to really clarify about abdominal pain. Nephrology consulted for dialysis needs.    A/P   ESRD  Center: [facility]  Nephrologist: Dr. Navarro   Access: L AVF  MWF schedule.  Consent obtained from niece, and proxy, Ramonita Vincent via phone [phone]  s/p HD 5/5 due high K+    s/p HD 5/6 .  s/p HD today.  C/W MWF schedule inpatient.  Renal diet  Monitor BMP     HTN   BP fluctuating but controlled.  Low salt diet.  C/W current anti- hypertensives.  UF with HD   Monitor BP     Anemia  At goal.  TAINA w/ HD; hold for Hgb >11.  Check iron studies.  Monitor Hb     Hyperkalemia   s/p HD 5/5, 5/6  K high normal.  low K diet   HD as scheduled.  monitor     Hyponatremia   s/p HD 5/6  better  Fluid restriction to 1L/day.  monitor     Acidosis   non AG + AG  Improved w/ HD.  monitor     CKD-MBD   - at goal.  PO4 WNL  Off binder.  Monitor Ca, PO4 daily     Nausea/Vomiting  CT A/P shows constipation and wall thickening, worrisome for stercoral colitis  s/p disimpaction.  Colorectal surgery f/u

## 2024-05-10 NOTE — PROGRESS NOTE ADULT - PROBLEM SELECTOR PLAN 1
Due to MRSA bacteremia  -unclear source of MRSA infection  -CT chest without obvious PNA, did mention a masslike consolidation in LLL most suggestive of atelectasis  -urine culture with no growth  -ID following, appreciate recs  -c/w vancomycin after HD on HD days M/W/F; will need through 5/27  -ISRAEL w/o vegetation, but noted reduced global hypokinesis

## 2024-05-10 NOTE — CONSULT NOTE ADULT - CONSULT REQUESTED DATE/TIME
25-Apr-2024 01:55
25-Apr-2024 15:00
10-May-2024 10:43
25-Apr-2024 13:29
26-Apr-2024 08:48
01-May-2024 15:43

## 2024-05-10 NOTE — PROVIDER CONTACT NOTE (CRITICAL VALUE NOTIFICATION) - SITUATION
critical lab report: blood glucose 50; vanco trough 27.9
Pt had a critical Vancomycin trough of 27.
Patient had a critical glucose of 49 on AM labs.
Received a call from [facility]; blood cultures from 4/26- growth in aerobic bottle gram positive cocci in clusters
critical lab: potassium 6.2
Received a call from lab regarding positive blood cultures in anaerobic bottle for MRSA.
Received a call from the lab reporting a blood glucose level of 50 from the BMP
Pt has critical lab value of glucose 42
Received a call from lab reporting Blood culture from 4/24- growth in monique/aerobic bottle gram + cocci in clusters
critical lab value of potassium 6.5

## 2024-05-10 NOTE — PROVIDER CONTACT NOTE (CRITICAL VALUE NOTIFICATION) - BACKGROUND
Admitted for nausea and vomiting.
68 y/o M admitted for sepsis 2/2 colitis PMHx ESRD, CVA, AKA/BKA
69 year old M admitted for sepsis 2/2 colitis, PMHx ESRD, CVA, CAD, HTN, BKA/AKA
Pt AXo0 admitted for nausea and vomiting/ sepsis.
Pt AXo0 admitted for nausea and vomiting/ sepsis.
patient was admitted for sepsis with PMHx of DM, HTN, MRSA, CKD on HD
68 y/o M admitted for sepsis 2/2 colitis, PMHx ESRD, CVA, AKA/BKA
Admitted for nausea and vomiting.
Admitted for nausea and vomiting.

## 2024-05-10 NOTE — CONSULT NOTE ADULT - SUBJECTIVE AND OBJECTIVE BOX
HPI:  69 year old M with PMH ESRD on HD via left AVF MWF with Dr. Nino Vital, CVA, BKA/AKA, functionally quadriplegic, CAD, HTN, HLD, history of O2 as needed presents for evaluation of vomiting.  Per Ed notes pt was brought in from HD, started vomiting while receiving treatment.  Pt report generalized pain but no chest pain, abdominal pain, headache. Per notes pt is AO x 1/0 at baseline. Unable to gather complete HPI d/t pt dementia.  (25 Apr 2024 13:48)      Consulted for: hypoglycemia  This is a 70 yo M /w ESRD, prior CVA, BKA and AKA, functional quadriplegia and dementia (AXOX1 at baseline) who presents with abdominal pain and found to have stercolitis and MRSA bacteremia. Patient has been having hypoglycemia on multiple occaisions with glucose <55. Unclear if he has had symptoms previously due to his underlying dementia. Most episodes occur in early AM around 4:45    No known history of glucocorticoid use  CT A/P without contrast on 4/24 shows normal adrenal glands    PAST MEDICAL & SURGICAL HISTORY:  DM (diabetes mellitus)      HTN (hypertension)      Below knee amputation status, right      CKD (chronic kidney disease)      MRSA (methicillin resistant Staphylococcus aureus) colonization  (hx/o MRSA growth from wound culture)      Wound  (chronic wounds to left foot and leg)      DM (diabetes mellitus)      HTN (hypertension)      Kidney disease      S/P BKA (below knee amputation) unilateral, right      H/O peripheral artery bypass  left fem to below-knee pop with RSVG      Amputated left leg  above knee      Amputated right leg  below knee          FAMILY HISTORY:  Family history of diabetes mellitus        Social History:    Home Medications:  acetaminophen 650 mg oral tablet: orally every 6 hours as needed for pain (25 Apr 2024 13:37)  aspirin 81 mg oral tablet: 81 milligram(s) orally once a day (25 Apr 2024 13:36)  atorvastatin 40 mg oral tablet: 1 tab(s) orally once a day (at bedtime) (25 Apr 2024 13:37)  calcium acetate 667 mg oral tablet: 1 tab(s) orally 3 times a day (25 Apr 2024 13:38)  furosemide 40 mg oral tablet: 1 tab(s) orally 2 times a day (25 Apr 2024 13:38)  lactobacillus acidophilus oral capsule: 1 cap(s) orally once a day (25 Apr 2024 13:38)  lactulose 10 g oral powder for reconstitution: 1 each orally once a day (25 Apr 2024 13:38)  midodrine 5 mg oral tablet: 2 tab(s) orally 3 times a week (25 Apr 2024 13:38)  NexIUM 40 mg oral delayed release capsule: 1 cap(s) orally once a day (25 Apr 2024 13:38)  Senna 8.6 mg oral tablet: 1 tab(s) orally once a day (at bedtime) (25 Apr 2024 13:38)      MEDICATIONS  (STANDING):  albuterol/ipratropium for Nebulization 3 milliLiter(s) Nebulizer every 6 hours  ascorbic acid 500 milliGRAM(s) Oral daily  aspirin enteric coated 81 milliGRAM(s) Oral daily  atorvastatin 40 milliGRAM(s) Oral at bedtime  carvedilol 6.25 milliGRAM(s) Oral every 12 hours  chlorhexidine 2% Cloths 1 Application(s) Topical daily  dextrose 50% Injectable 25 Gram(s) IV Push once  dextrose 50% Injectable 12.5 Gram(s) IV Push once  dextrose 50% Injectable 25 Gram(s) IV Push once  dextrose Oral Gel 15 Gram(s) Oral once  glucagon  Injectable 1 milliGRAM(s) IntraMuscular once  heparin   Injectable 5000 Unit(s) SubCutaneous every 12 hours  lactobacillus acidophilus 1 Tablet(s) Oral daily  lactulose Syrup 10 Gram(s) Oral daily  Nephro-hannah 1 Tablet(s) Oral daily  pantoprazole    Tablet 40 milliGRAM(s) Oral before breakfast  polyethylene glycol 3350 17 Gram(s) Oral daily  senna 2 Tablet(s) Oral at bedtime  sodium chloride 3%  Inhalation 4 milliLiter(s) Inhalation every 6 hours    MEDICATIONS  (PRN):  acetaminophen     Tablet .. 650 milliGRAM(s) Oral every 6 hours PRN Temp greater or equal to 38C (100.4F), Mild Pain (1 - 3)  aluminum hydroxide/magnesium hydroxide/simethicone Suspension 30 milliLiter(s) Oral every 4 hours PRN Dyspepsia  melatonin 3 milliGRAM(s) Oral at bedtime PRN Insomnia  ondansetron Injectable 4 milliGRAM(s) IV Push every 8 hours PRN Nausea and/or Vomiting  oxyCODONE    IR 5 milliGRAM(s) Oral every 6 hours PRN moderate to severe pain  sodium chloride 0.9% Bolus. 100 milliLiter(s) IV Bolus every 5 minutes PRN SBP LESS THAN or EQUAL to 80 mmHg      Allergies    No Known Allergies    Intolerances      Review of Systems:  Constitutional: No fever  Eyes: No blurry vision  Neuro: No tremors  HEENT: No pain  Cardiovascular: No chest pain, palpitations  Respiratory: No SOB, no cough  GI: No nausea, vomiting, abdominal pain  : No dysuria  Skin: no rash  Psych: no depression  Endocrine: no polyuria, polydipsia  Hem/lymph: no swelling  Osteoporosis: no fractures    PHYSICAL EXAM:  VITALS: T(C): 36.2 (05-10-24 @ 09:45)  T(F): 97.2 (05-10-24 @ 09:45), Max: 98.5 (05-09-24 @ 21:15)  HR: 66 (05-10-24 @ 09:45) (60 - 70)  BP: 105/54 (05-10-24 @ 09:45) (105/54 - 125/65)  RR:  (17 - 18)  SpO2:  (97% - 100%)  Wt(kg): --  GENERAL: NAD  EYES: No proptosis, no lid lag, anicteric  THYROID: Normal size, no palpable nodules  RESPIRATORY: Clear to auscultation bilaterally  CARDIOVASCULAR: Regular rate and rhythm  GI: Soft, nontender, non distended  EXT: b/l feet without wounds; 2+ pulses  PSYCH: Alert and oriented x 3, reactive mood    POCT Blood Glucose.: 275 mg/dL (05-10-24 @ 07:47)  POCT Blood Glucose.: 67 mg/dL (05-10-24 @ 07:19)  POCT Blood Glucose.: 67 mg/dL (05-10-24 @ 07:17)  POCT Blood Glucose.: 131 mg/dL (05-09-24 @ 21:46)  POCT Blood Glucose.: 87 mg/dL (05-09-24 @ 16:31)  POCT Blood Glucose.: 84 mg/dL (05-09-24 @ 11:19)  POCT Blood Glucose.: 84 mg/dL (05-09-24 @ 07:13)  POCT Blood Glucose.: 99 mg/dL (05-08-24 @ 23:09)  POCT Blood Glucose.: 80 mg/dL (05-08-24 @ 21:50)  POCT Blood Glucose.: 89 mg/dL (05-08-24 @ 16:17)  POCT Blood Glucose.: 147 mg/dL (05-08-24 @ 11:12)  POCT Blood Glucose.: 89 mg/dL (05-08-24 @ 09:24)  POCT Blood Glucose.: 78 mg/dL (05-08-24 @ 05:41)  POCT Blood Glucose.: 83 mg/dL (05-08-24 @ 03:41)  POCT Blood Glucose.: 112 mg/dL (05-07-24 @ 21:50)  POCT Blood Glucose.: 117 mg/dL (05-07-24 @ 17:56)  POCT Blood Glucose.: 70 mg/dL (05-07-24 @ 16:56)  POCT Blood Glucose.: 120 mg/dL (05-07-24 @ 11:22)                            10.9   2.90  )-----------( 200      ( 10 May 2024 04:58 )             34.9       05-10    132<L>  |  93<L>  |  18  ----------------------------<  49<LL>  5.2   |  25  |  4.22<H>    eGFR: 14<L>    Ca    9.0      05-10  Mg     2.70     05-10  Phos  3.4     05-10    TPro  7.3  /  Alb  3.0<L>  /  TBili  0.2  /  DBili  <0.2  /  AST  23  /  ALT  9   /  AlkPhos  85  05-09      Thyroid Function Tests:  05-07 @ 04:54 TSH 1.96 FreeT4 -- T3 -- Anti TPO -- Anti Thyroglobulin Ab -- TSI --      A1C with Estimated Average Glucose Result: 4.8 % (05-02-24 @ 06:15)  A1C with Estimated Average Glucose Result: 4.7 % (10-07-23 @ 09:30)  A1C with Estimated Average Glucose Result: 5.1 % (06-23-23 @ 10:25)          Radiology:                HPI:  69 year old M with PMH ESRD on HD via left AVF MWF with Dr. Nino Vital, CVA, BKA/AKA, functionally quadriplegic, CAD, HTN, HLD, history of O2 as needed presents for evaluation of vomiting.  Per Ed notes pt was brought in from HD, started vomiting while receiving treatment.  Pt report generalized pain but no chest pain, abdominal pain, headache. Per notes pt is AO x 1/0 at baseline. Unable to gather complete HPI d/t pt dementia.  (25 Apr 2024 13:48)      Consulted for: hypoglycemia  This is a 68 yo M /w ESRD, prior CVA, BKA and AKA, functional quadriplegia and dementia (AXOX1 at baseline) who presents with abdominal pain and found to have stercolitis and MRSA bacteremia. Patient has been having hypoglycemia on multiple occaisions with glucose <55. Unclear if he has had symptoms previously due to his underlying dementia. Most episodes occur in early AM around 4:45    Patient seen at bedside. Not able to provide much history    No known history of glucocorticoid use  CT A/P without contrast on 4/24 shows normal adrenal glands    PAST MEDICAL & SURGICAL HISTORY:  DM (diabetes mellitus)      HTN (hypertension)      Below knee amputation status, right      CKD (chronic kidney disease)      MRSA (methicillin resistant Staphylococcus aureus) colonization  (hx/o MRSA growth from wound culture)      Wound  (chronic wounds to left foot and leg)      DM (diabetes mellitus)      HTN (hypertension)      Kidney disease      S/P BKA (below knee amputation) unilateral, right      H/O peripheral artery bypass  left fem to below-knee pop with RSVG      Amputated left leg  above knee      Amputated right leg  below knee          FAMILY HISTORY:  Family history of diabetes mellitus        Social History:    Home Medications:  acetaminophen 650 mg oral tablet: orally every 6 hours as needed for pain (25 Apr 2024 13:37)  aspirin 81 mg oral tablet: 81 milligram(s) orally once a day (25 Apr 2024 13:36)  atorvastatin 40 mg oral tablet: 1 tab(s) orally once a day (at bedtime) (25 Apr 2024 13:37)  calcium acetate 667 mg oral tablet: 1 tab(s) orally 3 times a day (25 Apr 2024 13:38)  furosemide 40 mg oral tablet: 1 tab(s) orally 2 times a day (25 Apr 2024 13:38)  lactobacillus acidophilus oral capsule: 1 cap(s) orally once a day (25 Apr 2024 13:38)  lactulose 10 g oral powder for reconstitution: 1 each orally once a day (25 Apr 2024 13:38)  midodrine 5 mg oral tablet: 2 tab(s) orally 3 times a week (25 Apr 2024 13:38)  NexIUM 40 mg oral delayed release capsule: 1 cap(s) orally once a day (25 Apr 2024 13:38)  Senna 8.6 mg oral tablet: 1 tab(s) orally once a day (at bedtime) (25 Apr 2024 13:38)      MEDICATIONS  (STANDING):  albuterol/ipratropium for Nebulization 3 milliLiter(s) Nebulizer every 6 hours  ascorbic acid 500 milliGRAM(s) Oral daily  aspirin enteric coated 81 milliGRAM(s) Oral daily  atorvastatin 40 milliGRAM(s) Oral at bedtime  carvedilol 6.25 milliGRAM(s) Oral every 12 hours  chlorhexidine 2% Cloths 1 Application(s) Topical daily  dextrose 50% Injectable 25 Gram(s) IV Push once  dextrose 50% Injectable 12.5 Gram(s) IV Push once  dextrose 50% Injectable 25 Gram(s) IV Push once  dextrose Oral Gel 15 Gram(s) Oral once  glucagon  Injectable 1 milliGRAM(s) IntraMuscular once  heparin   Injectable 5000 Unit(s) SubCutaneous every 12 hours  lactobacillus acidophilus 1 Tablet(s) Oral daily  lactulose Syrup 10 Gram(s) Oral daily  Nephro-hannah 1 Tablet(s) Oral daily  pantoprazole    Tablet 40 milliGRAM(s) Oral before breakfast  polyethylene glycol 3350 17 Gram(s) Oral daily  senna 2 Tablet(s) Oral at bedtime  sodium chloride 3%  Inhalation 4 milliLiter(s) Inhalation every 6 hours    MEDICATIONS  (PRN):  acetaminophen     Tablet .. 650 milliGRAM(s) Oral every 6 hours PRN Temp greater or equal to 38C (100.4F), Mild Pain (1 - 3)  aluminum hydroxide/magnesium hydroxide/simethicone Suspension 30 milliLiter(s) Oral every 4 hours PRN Dyspepsia  melatonin 3 milliGRAM(s) Oral at bedtime PRN Insomnia  ondansetron Injectable 4 milliGRAM(s) IV Push every 8 hours PRN Nausea and/or Vomiting  oxyCODONE    IR 5 milliGRAM(s) Oral every 6 hours PRN moderate to severe pain  sodium chloride 0.9% Bolus. 100 milliLiter(s) IV Bolus every 5 minutes PRN SBP LESS THAN or EQUAL to 80 mmHg      Allergies    No Known Allergies    Intolerances      Review of Systems:  Constitutional: No fever  Eyes: No blurry vision  Neuro: No tremors  HEENT: No pain  Cardiovascular: No chest pain, palpitations  Respiratory: No SOB, no cough  GI: No nausea, vomiting, abdominal pain  : No dysuria  Skin: no rash  Psych: no depression  Endocrine: no polyuria, polydipsia  Hem/lymph: no swelling  Osteoporosis: no fractures    PHYSICAL EXAM:  VITALS: T(C): 36.2 (05-10-24 @ 09:45)  T(F): 97.2 (05-10-24 @ 09:45), Max: 98.5 (05-09-24 @ 21:15)  HR: 66 (05-10-24 @ 09:45) (60 - 70)  BP: 105/54 (05-10-24 @ 09:45) (105/54 - 125/65)  RR:  (17 - 18)  SpO2:  (97% - 100%)  Wt(kg): --  GENERAL: NAD  EYES: No proptosis, no lid lag, anicteric  THYROID: Normal size, no palpable nodules  RESPIRATORY: Clear to auscultation bilaterally  CARDIOVASCULAR: Regular rate and rhythm  GI: Soft, nontender, non distended  EXT: b/l feet without wounds; 2+ pulses  PSYCH: Alert and oriented x 3, reactive mood    POCT Blood Glucose.: 275 mg/dL (05-10-24 @ 07:47)  POCT Blood Glucose.: 67 mg/dL (05-10-24 @ 07:19)  POCT Blood Glucose.: 67 mg/dL (05-10-24 @ 07:17)  POCT Blood Glucose.: 131 mg/dL (05-09-24 @ 21:46)  POCT Blood Glucose.: 87 mg/dL (05-09-24 @ 16:31)  POCT Blood Glucose.: 84 mg/dL (05-09-24 @ 11:19)  POCT Blood Glucose.: 84 mg/dL (05-09-24 @ 07:13)  POCT Blood Glucose.: 99 mg/dL (05-08-24 @ 23:09)  POCT Blood Glucose.: 80 mg/dL (05-08-24 @ 21:50)  POCT Blood Glucose.: 89 mg/dL (05-08-24 @ 16:17)  POCT Blood Glucose.: 147 mg/dL (05-08-24 @ 11:12)  POCT Blood Glucose.: 89 mg/dL (05-08-24 @ 09:24)  POCT Blood Glucose.: 78 mg/dL (05-08-24 @ 05:41)  POCT Blood Glucose.: 83 mg/dL (05-08-24 @ 03:41)  POCT Blood Glucose.: 112 mg/dL (05-07-24 @ 21:50)  POCT Blood Glucose.: 117 mg/dL (05-07-24 @ 17:56)  POCT Blood Glucose.: 70 mg/dL (05-07-24 @ 16:56)  POCT Blood Glucose.: 120 mg/dL (05-07-24 @ 11:22)                            10.9   2.90  )-----------( 200      ( 10 May 2024 04:58 )             34.9       05-10    132<L>  |  93<L>  |  18  ----------------------------<  49<LL>  5.2   |  25  |  4.22<H>    eGFR: 14<L>    Ca    9.0      05-10  Mg     2.70     05-10  Phos  3.4     05-10    TPro  7.3  /  Alb  3.0<L>  /  TBili  0.2  /  DBili  <0.2  /  AST  23  /  ALT  9   /  AlkPhos  85  05-09      Thyroid Function Tests:  05-07 @ 04:54 TSH 1.96 FreeT4 -- T3 -- Anti TPO -- Anti Thyroglobulin Ab -- TSI --      A1C with Estimated Average Glucose Result: 4.8 % (05-02-24 @ 06:15)  A1C with Estimated Average Glucose Result: 4.7 % (10-07-23 @ 09:30)  A1C with Estimated Average Glucose Result: 5.1 % (06-23-23 @ 10:25)          Radiology:

## 2024-05-10 NOTE — PROVIDER CONTACT NOTE (CRITICAL VALUE NOTIFICATION) - NAME OF MD/NP/PA/DO NOTIFIED:
Tameka Clinton
Kenan De Dios
Lenny Yeung
Caro Monson
EMILIA Walker
Neelima Narayan
Kenan De Dios
EMILIA Ellis
Brain De Dios
Shaheed Suazo

## 2024-05-11 LAB
ACTH SER-ACNC: 37.3 PG/ML — SIGNIFICANT CHANGE UP (ref 7.2–63.3)
ANION GAP SERPL CALC-SCNC: 10 MMOL/L — SIGNIFICANT CHANGE UP (ref 7–14)
BUN SERPL-MCNC: 14 MG/DL — SIGNIFICANT CHANGE UP (ref 7–23)
CALCIUM SERPL-MCNC: 8.8 MG/DL — SIGNIFICANT CHANGE UP (ref 8.4–10.5)
CHLORIDE SERPL-SCNC: 94 MMOL/L — LOW (ref 98–107)
CO2 SERPL-SCNC: 28 MMOL/L — SIGNIFICANT CHANGE UP (ref 22–31)
CREAT SERPL-MCNC: 3.58 MG/DL — HIGH (ref 0.5–1.3)
EGFR: 18 ML/MIN/1.73M2 — LOW
FERRITIN SERPL-MCNC: 1680 NG/ML — HIGH (ref 30–400)
GLUCOSE BLDC GLUCOMTR-MCNC: 106 MG/DL — HIGH (ref 70–99)
GLUCOSE BLDC GLUCOMTR-MCNC: 115 MG/DL — HIGH (ref 70–99)
GLUCOSE BLDC GLUCOMTR-MCNC: 72 MG/DL — SIGNIFICANT CHANGE UP (ref 70–99)
GLUCOSE BLDC GLUCOMTR-MCNC: 93 MG/DL — SIGNIFICANT CHANGE UP (ref 70–99)
GLUCOSE BLDC GLUCOMTR-MCNC: 95 MG/DL — SIGNIFICANT CHANGE UP (ref 70–99)
GLUCOSE BLDC GLUCOMTR-MCNC: 97 MG/DL — SIGNIFICANT CHANGE UP (ref 70–99)
GLUCOSE SERPL-MCNC: 93 MG/DL — SIGNIFICANT CHANGE UP (ref 70–99)
HCT VFR BLD CALC: 33.2 % — LOW (ref 39–50)
HGB BLD-MCNC: 10.6 G/DL — LOW (ref 13–17)
IRON SATN MFR SERPL: 33 % — SIGNIFICANT CHANGE UP (ref 14–50)
IRON SATN MFR SERPL: 42 UG/DL — LOW (ref 45–165)
MAGNESIUM SERPL-MCNC: 2.5 MG/DL — SIGNIFICANT CHANGE UP (ref 1.6–2.6)
MCHC RBC-ENTMCNC: 27.2 PG — SIGNIFICANT CHANGE UP (ref 27–34)
MCHC RBC-ENTMCNC: 31.9 GM/DL — LOW (ref 32–36)
MCV RBC AUTO: 85.1 FL — SIGNIFICANT CHANGE UP (ref 80–100)
NRBC # BLD: 0 /100 WBCS — SIGNIFICANT CHANGE UP (ref 0–0)
NRBC # FLD: 0 K/UL — SIGNIFICANT CHANGE UP (ref 0–0)
PHOSPHATE SERPL-MCNC: 2.8 MG/DL — SIGNIFICANT CHANGE UP (ref 2.5–4.5)
PLATELET # BLD AUTO: 177 K/UL — SIGNIFICANT CHANGE UP (ref 150–400)
POTASSIUM SERPL-MCNC: 4.3 MMOL/L — SIGNIFICANT CHANGE UP (ref 3.5–5.3)
POTASSIUM SERPL-SCNC: 4.3 MMOL/L — SIGNIFICANT CHANGE UP (ref 3.5–5.3)
RBC # BLD: 3.9 M/UL — LOW (ref 4.2–5.8)
RBC # FLD: 19 % — HIGH (ref 10.3–14.5)
SODIUM SERPL-SCNC: 132 MMOL/L — LOW (ref 135–145)
TIBC SERPL-MCNC: 127 UG/DL — LOW (ref 220–430)
UIBC SERPL-MCNC: 85 UG/DL — LOW (ref 110–370)
WBC # BLD: 3.3 K/UL — LOW (ref 3.8–10.5)
WBC # FLD AUTO: 3.3 K/UL — LOW (ref 3.8–10.5)

## 2024-05-11 PROCEDURE — 99232 SBSQ HOSP IP/OBS MODERATE 35: CPT

## 2024-05-11 RX ADMIN — Medication 1 TABLET(S): at 11:18

## 2024-05-11 RX ADMIN — PANTOPRAZOLE SODIUM 40 MILLIGRAM(S): 20 TABLET, DELAYED RELEASE ORAL at 05:08

## 2024-05-11 RX ADMIN — Medication 81 MILLIGRAM(S): at 11:19

## 2024-05-11 RX ADMIN — LACTULOSE 10 GRAM(S): 10 SOLUTION ORAL at 11:19

## 2024-05-11 RX ADMIN — ATORVASTATIN CALCIUM 40 MILLIGRAM(S): 80 TABLET, FILM COATED ORAL at 22:08

## 2024-05-11 RX ADMIN — POLYETHYLENE GLYCOL 3350 17 GRAM(S): 17 POWDER, FOR SOLUTION ORAL at 11:18

## 2024-05-11 RX ADMIN — Medication 1 TABLET(S): at 11:19

## 2024-05-11 RX ADMIN — CHLORHEXIDINE GLUCONATE 1 APPLICATION(S): 213 SOLUTION TOPICAL at 11:19

## 2024-05-11 RX ADMIN — CARVEDILOL PHOSPHATE 6.25 MILLIGRAM(S): 80 CAPSULE, EXTENDED RELEASE ORAL at 17:39

## 2024-05-11 RX ADMIN — CARVEDILOL PHOSPHATE 6.25 MILLIGRAM(S): 80 CAPSULE, EXTENDED RELEASE ORAL at 05:08

## 2024-05-11 RX ADMIN — Medication 650 MILLIGRAM(S): at 23:11

## 2024-05-11 RX ADMIN — Medication 500 MILLIGRAM(S): at 11:19

## 2024-05-11 RX ADMIN — HEPARIN SODIUM 5000 UNIT(S): 5000 INJECTION INTRAVENOUS; SUBCUTANEOUS at 17:39

## 2024-05-11 RX ADMIN — HEPARIN SODIUM 5000 UNIT(S): 5000 INJECTION INTRAVENOUS; SUBCUTANEOUS at 05:08

## 2024-05-11 RX ADMIN — Medication 650 MILLIGRAM(S): at 22:11

## 2024-05-11 RX ADMIN — SENNA PLUS 2 TABLET(S): 8.6 TABLET ORAL at 22:08

## 2024-05-11 NOTE — PROGRESS NOTE ADULT - SUBJECTIVE AND OBJECTIVE BOX
[facility] [place] [facility]   MD MARIEBLL CARRION MD MARIA SANTIAGO, NP    TEL:  OFFICE: [phone]  From 5pm-7am Answering Service [phone]    -- RENAL FOLLOW UP NOTE ---Date of Service 05-11-24 @ 14:16    Patient is a 69y old  Male who presents with a chief complaint of nausea and vomiting       Patient seen and examined at bedside. No chest pain/sob    VITALS:  T(F): 98.2 (05-11-24 @ 09:10), Max: 98.3 (05-10-24 @ 21:15)  HR: 72 (05-11-24 @ 09:10)  BP: 138/68 (05-11-24 @ 09:10)  RR: 18 (05-11-24 @ 09:10)  SpO2: 100% (05-11-24 @ 09:10)  Wt(kg): --    05-10 @ 07:01  -  05-11 @ 07:00  --------------------------------------------------------  IN: 640 mL / OUT: 1739 mL / NET: -1099 mL          PHYSICAL EXAM:  General: NAD  Neck: No JVD  Respiratory: CTAB, no wheezes, rales or rhonchi  Cardiovascular: S1, S2, RRR  Gastrointestinal: BS+, soft, NT/ND  Extremities: R AKA, L BKA    Hospital Medications:   MEDICATIONS  (STANDING):  albuterol/ipratropium for Nebulization 3 milliLiter(s) Nebulizer every 6 hours  ascorbic acid 500 milliGRAM(s) Oral daily  aspirin enteric coated 81 milliGRAM(s) Oral daily  atorvastatin 40 milliGRAM(s) Oral at bedtime  carvedilol 6.25 milliGRAM(s) Oral every 12 hours  chlorhexidine 2% Cloths 1 Application(s) Topical daily  dextrose 50% Injectable 25 Gram(s) IV Push once  dextrose 50% Injectable 12.5 Gram(s) IV Push once  dextrose 50% Injectable 25 Gram(s) IV Push once  dextrose Oral Gel 15 Gram(s) Oral once  glucagon  Injectable 1 milliGRAM(s) IntraMuscular once  heparin   Injectable 5000 Unit(s) SubCutaneous every 12 hours  lactobacillus acidophilus 1 Tablet(s) Oral daily  lactulose Syrup 10 Gram(s) Oral daily  Nephro-hannah 1 Tablet(s) Oral daily  pantoprazole    Tablet 40 milliGRAM(s) Oral before breakfast  polyethylene glycol 3350 17 Gram(s) Oral daily  senna 2 Tablet(s) Oral at bedtime  sodium chloride 3%  Inhalation 4 milliLiter(s) Inhalation every 6 hours      LABS:  05-11    132<L>  |  94<L>  |  14  ----------------------------<  93  4.3   |  28  |  3.58<H>    Ca    8.8      11 May 2024 07:26  Phos  2.8     05-11  Mg     2.50     05-11      Creatinine Trend: 3.58 <--, 4.22 <--, 3.14 <--, 4.10 <--, 2.69 <--, 3.73 <--, 3.28 <--, 2.70 <--, 4.67 <--, 4.43 <--, 4.15 <--, 4.13 <--    Phosphorus: 2.8 mg/dL (05-11 @ 07:26)  Iron Total: 42 ug/dL (05-11 @ 07:26)  Iron - Total Binding Capacity.: 127 ug/dL (05-11 @ 07:26)  Ferritin: 1680 ng/mL (05-11 @ 07:26)                              10.6   3.30  )-----------( 177      ( 11 May 2024 07:26 )             33.2     Urine Studies:  Urinalysis - [05-11-24 @ 07:26]      Color  / Appearance  / SG  / pH       Gluc 93 / Ketone   / Bili  / Urobili        Blood  / Protein  / Leuk Est  / Nitrite       RBC  / WBC  / Hyaline  / Gran  / Sq Epi  / Non Sq Epi  / Bacteria       Iron 42, TIBC 127, %sat 33      [05-11-24 @ 07:26]  Ferritin 1680      [05-11-24 @ 07:26]  PTH -- (Ca --)      [04-28-24 @ 05:00]   155  HbA1c 4.2      [07-19-19 @ 09:56]  TSH 1.96      [05-07-24 @ 04:54]  Lipid: chol 119, TG 66, HDL 46, LDL --      [10-07-23 @ 09:30]    HBsAb 204.3      [05-03-24 @ 11:25]  HBsAg Nonreact      [05-04-24 @ 16:30]  HBcAb Nonreact      [05-03-24 @ 11:25]  HCV 0.12, Nonreact      [05-04-24 @ 16:30]      RADIOLOGY & ADDITIONAL STUDIES:

## 2024-05-11 NOTE — PROGRESS NOTE ADULT - ASSESSMENT
69-year-old male with PMH end-stage renal on HD via left aVF, CVA, BKA/AKA, functionally quadriplegic, CAD, HTN, HLD, history of O2 as needed presents for evaluation of vomiting.  Brought from HD, started vomiting while receiving treatment.  Patient unable to endorse any specific complaints, per paperwork AO x 1/0 at baseline.  Patient denies chest pain or difficulty breathing, unable to really clarify about abdominal pain. Nephrology consulted for dialysis needs.    A/P   ESRD  Center: [place]  Nephrologist: Dr. Navarro   Access: L AVF  MWF schedule.  Consent obtained from niece, and proxy, Ramonita Vincent via phone [phone]  s/p HD 5/5 due high K+    s/p HD 5/6 .  s/p HD 5/10 UF 1339  C/W MWF schedule inpatient.  Renal diet  Monitor BMP     HTN   BP fluctuating but controlled.  Low salt diet.  C/W current anti- hypertensives.  UF with HD   Monitor BP     Anemia  At goal.  TAINA w/ HD; hold for Hgb >11.  Check iron studies.  Monitor Hb     Hyperkalemia   s/p HD 5/5, 5/6  K better daoy  low K diet   HD as scheduled.  monitor     Hyponatremia   s/p HD 5/10  Fluid restriction to 1L/day.  monitor     Acidosis   non AG + AG  Improved w/ HD.  monitor     CKD-MBD   - at goal.  PO4 WNL  Off binder.  Monitor Ca, PO4 daily     Nausea/Vomiting  CT A/P shows constipation and wall thickening, worrisome for stercoral colitis  s/p disimpaction.  Colorectal surgery f/u

## 2024-05-11 NOTE — PROGRESS NOTE ADULT - SUBJECTIVE AND OBJECTIVE BOX
Edson Manzo MD  [employer]  Pager [phone]  Email: [email or web address]  Available on [employer] Teams        PROGRESS NOTE:     Patient is a 69y old  Male who presents with a chief complaint of nausea and vomiting (10 May 2024 18:01)      SUBJECTIVE / OVERNIGHT EVENTS:  Patient seen and examined this morning. FS/Glucose better this morning.   ADDITIONAL REVIEW OF SYSTEMS:  Review of system unobtainable secondary to mental status.  MEDICATIONS  (STANDING):  albuterol/ipratropium for Nebulization 3 milliLiter(s) Nebulizer every 6 hours  ascorbic acid 500 milliGRAM(s) Oral daily  aspirin enteric coated 81 milliGRAM(s) Oral daily  atorvastatin 40 milliGRAM(s) Oral at bedtime  carvedilol 6.25 milliGRAM(s) Oral every 12 hours  chlorhexidine 2% Cloths 1 Application(s) Topical daily  dextrose 50% Injectable 25 Gram(s) IV Push once  dextrose 50% Injectable 12.5 Gram(s) IV Push once  dextrose 50% Injectable 25 Gram(s) IV Push once  dextrose Oral Gel 15 Gram(s) Oral once  glucagon  Injectable 1 milliGRAM(s) IntraMuscular once  heparin   Injectable 5000 Unit(s) SubCutaneous every 12 hours  lactobacillus acidophilus 1 Tablet(s) Oral daily  lactulose Syrup 10 Gram(s) Oral daily  Nephro-hannah 1 Tablet(s) Oral daily  pantoprazole    Tablet 40 milliGRAM(s) Oral before breakfast  polyethylene glycol 3350 17 Gram(s) Oral daily  senna 2 Tablet(s) Oral at bedtime  sodium chloride 3%  Inhalation 4 milliLiter(s) Inhalation every 6 hours    MEDICATIONS  (PRN):  acetaminophen     Tablet .. 650 milliGRAM(s) Oral every 6 hours PRN Temp greater or equal to 38C (100.4F), Mild Pain (1 - 3)  aluminum hydroxide/magnesium hydroxide/simethicone Suspension 30 milliLiter(s) Oral every 4 hours PRN Dyspepsia  melatonin 3 milliGRAM(s) Oral at bedtime PRN Insomnia  ondansetron Injectable 4 milliGRAM(s) IV Push every 8 hours PRN Nausea and/or Vomiting  oxyCODONE    IR 5 milliGRAM(s) Oral every 6 hours PRN moderate to severe pain  sodium chloride 0.9% Bolus. 100 milliLiter(s) IV Bolus every 5 minutes PRN SBP LESS THAN or EQUAL to 80 mmHg      CAPILLARY BLOOD GLUCOSE      POCT Blood Glucose.: 95 mg/dL (11 May 2024 09:05)  POCT Blood Glucose.: 97 mg/dL (11 May 2024 05:13)  POCT Blood Glucose.: 72 mg/dL (11 May 2024 01:18)  POCT Blood Glucose.: 88 mg/dL (10 May 2024 21:28)  POCT Blood Glucose.: 146 mg/dL (10 May 2024 16:31)  POCT Blood Glucose.: 124 mg/dL (10 May 2024 15:55)  POCT Blood Glucose.: 103 mg/dL (10 May 2024 12:05)    I&O's Summary    10 May 2024 07:01  -  11 May 2024 07:00  --------------------------------------------------------  IN: 640 mL / OUT: 1739 mL / NET: -1099 mL        PHYSICAL EXAM:  Vital Signs Last 24 Hrs  T(C): 36.7 (11 May 2024 05:05), Max: 36.8 (10 May 2024 21:15)  T(F): 98 (11 May 2024 05:05), Max: 98.3 (10 May 2024 21:15)  HR: 71 (11 May 2024 05:05) (68 - 71)  BP: 126/65 (11 May 2024 05:05) (110/63 - 126/65)  BP(mean): --  RR: 18 (11 May 2024 05:05) (17 - 18)  SpO2: 100% (11 May 2024 05:05) (98% - 100%)    Parameters below as of 11 May 2024 05:05  Patient On (Oxygen Delivery Method): room air        GENERAL: NAD, lying in bed, awake, interactive however, short answers, very confused  CHEST/LUNG: No use of accessory muscles, CTAB, breathing non-labored  COR: RR, no mrcg  ABD: Soft, ND/NT, +BS  PSYCH: AAOxself  SKIN: No rashes or lesions  EXT: wwp, s/p b/l AKA and BKA    LABS:                        10.6   3.30  )-----------( 177      ( 11 May 2024 07:26 )             33.2     05-11    132<L>  |  94<L>  |  14  ----------------------------<  93  4.3   |  28  |  3.58<H>    Ca    8.8      11 May 2024 07:26  Phos  2.8     05-11  Mg     2.50     05-11            Urinalysis Basic - ( 11 May 2024 07:26 )    Color: x / Appearance: x / SG: x / pH: x  Gluc: 93 mg/dL / Ketone: x  / Bili: x / Urobili: x   Blood: x / Protein: x / Nitrite: x   Leuk Esterase: x / RBC: x / WBC x   Sq Epi: x / Non Sq Epi: x / Bacteria: x          RADIOLOGY & ADDITIONAL TESTS:  Results Reviewed:   Imaging Personally Reviewed:  Electrocardiogram Personally Reviewed:    COORDINATION OF CARE:  Care Discussed with Consultants/Other Providers [Y/N]:  Prior or Outpatient Records Reviewed [Y/N]:

## 2024-05-12 LAB
ANION GAP SERPL CALC-SCNC: 18 MMOL/L — HIGH (ref 7–14)
BUN SERPL-MCNC: 27 MG/DL — HIGH (ref 7–23)
CALCIUM SERPL-MCNC: 9.8 MG/DL — SIGNIFICANT CHANGE UP (ref 8.4–10.5)
CHLORIDE SERPL-SCNC: 88 MMOL/L — LOW (ref 98–107)
CO2 SERPL-SCNC: 24 MMOL/L — SIGNIFICANT CHANGE UP (ref 22–31)
CREAT SERPL-MCNC: 4.53 MG/DL — HIGH (ref 0.5–1.3)
EGFR: 13 ML/MIN/1.73M2 — LOW
GLUCOSE BLDC GLUCOMTR-MCNC: 101 MG/DL — HIGH (ref 70–99)
GLUCOSE BLDC GLUCOMTR-MCNC: 108 MG/DL — HIGH (ref 70–99)
GLUCOSE BLDC GLUCOMTR-MCNC: 112 MG/DL — HIGH (ref 70–99)
GLUCOSE BLDC GLUCOMTR-MCNC: 129 MG/DL — HIGH (ref 70–99)
GLUCOSE BLDC GLUCOMTR-MCNC: 130 MG/DL — HIGH (ref 70–99)
GLUCOSE BLDC GLUCOMTR-MCNC: 137 MG/DL — HIGH (ref 70–99)
GLUCOSE SERPL-MCNC: 107 MG/DL — HIGH (ref 70–99)
MAGNESIUM SERPL-MCNC: 2.6 MG/DL — SIGNIFICANT CHANGE UP (ref 1.6–2.6)
PHOSPHATE SERPL-MCNC: 3 MG/DL — SIGNIFICANT CHANGE UP (ref 2.5–4.5)
POTASSIUM SERPL-MCNC: 5.2 MMOL/L — SIGNIFICANT CHANGE UP (ref 3.5–5.3)
POTASSIUM SERPL-SCNC: 5.2 MMOL/L — SIGNIFICANT CHANGE UP (ref 3.5–5.3)
SODIUM SERPL-SCNC: 130 MMOL/L — LOW (ref 135–145)

## 2024-05-12 PROCEDURE — 99232 SBSQ HOSP IP/OBS MODERATE 35: CPT

## 2024-05-12 RX ADMIN — POLYETHYLENE GLYCOL 3350 17 GRAM(S): 17 POWDER, FOR SOLUTION ORAL at 13:06

## 2024-05-12 RX ADMIN — OXYCODONE HYDROCHLORIDE 5 MILLIGRAM(S): 5 TABLET ORAL at 01:23

## 2024-05-12 RX ADMIN — PANTOPRAZOLE SODIUM 40 MILLIGRAM(S): 20 TABLET, DELAYED RELEASE ORAL at 06:11

## 2024-05-12 RX ADMIN — CARVEDILOL PHOSPHATE 6.25 MILLIGRAM(S): 80 CAPSULE, EXTENDED RELEASE ORAL at 06:11

## 2024-05-12 RX ADMIN — SENNA PLUS 2 TABLET(S): 8.6 TABLET ORAL at 22:36

## 2024-05-12 RX ADMIN — HEPARIN SODIUM 5000 UNIT(S): 5000 INJECTION INTRAVENOUS; SUBCUTANEOUS at 06:11

## 2024-05-12 RX ADMIN — SODIUM CHLORIDE 4 MILLILITER(S): 9 INJECTION INTRAMUSCULAR; INTRAVENOUS; SUBCUTANEOUS at 20:56

## 2024-05-12 RX ADMIN — Medication 3 MILLILITER(S): at 20:56

## 2024-05-12 RX ADMIN — Medication 650 MILLIGRAM(S): at 10:18

## 2024-05-12 RX ADMIN — LACTULOSE 10 GRAM(S): 10 SOLUTION ORAL at 13:04

## 2024-05-12 RX ADMIN — Medication 1 TABLET(S): at 13:06

## 2024-05-12 RX ADMIN — ATORVASTATIN CALCIUM 40 MILLIGRAM(S): 80 TABLET, FILM COATED ORAL at 22:36

## 2024-05-12 RX ADMIN — Medication 3 MILLIGRAM(S): at 22:36

## 2024-05-12 RX ADMIN — HEPARIN SODIUM 5000 UNIT(S): 5000 INJECTION INTRAVENOUS; SUBCUTANEOUS at 18:10

## 2024-05-12 RX ADMIN — CHLORHEXIDINE GLUCONATE 1 APPLICATION(S): 213 SOLUTION TOPICAL at 13:06

## 2024-05-12 RX ADMIN — Medication 1 TABLET(S): at 13:05

## 2024-05-12 RX ADMIN — OXYCODONE HYDROCHLORIDE 5 MILLIGRAM(S): 5 TABLET ORAL at 02:23

## 2024-05-12 RX ADMIN — Medication 500 MILLIGRAM(S): at 13:05

## 2024-05-12 RX ADMIN — Medication 650 MILLIGRAM(S): at 10:48

## 2024-05-12 RX ADMIN — Medication 81 MILLIGRAM(S): at 13:05

## 2024-05-12 RX ADMIN — CARVEDILOL PHOSPHATE 6.25 MILLIGRAM(S): 80 CAPSULE, EXTENDED RELEASE ORAL at 18:10

## 2024-05-12 NOTE — PROGRESS NOTE ADULT - PROBLEM SELECTOR PLAN 6
- Continue with Atorvastatin 40mg qhs   - Continue with ASA 81mg qd  - Cardiology consulted for abnormal appearing myocardium on CT abdomen/pelvis; however, heart appears to be normal size on CT chest    # Elevated Troponin, likely demand and also in setting of ESRD  - Troponin 384, 355  - No need to keep trending    #Chronic systolic CHF  - ISRAEL with severely reduced EF, LV global hypokinesis  - Likely driven by CAD  - started carvedilol; appreciate cardio recs Render Post-Care Instructions In Note?: no Post-Care Instructions: I reviewed with the patient in detail post-care instructions. Patient is to wear sunprotection, and avoid picking at any of the treated lesions. Pt may apply Vaseline to crusted or scabbing areas. Consent: The patient's consent was obtained including but not limited to risks of crusting, scabbing, blistering, scarring, darker or lighter pigmentary change, recurrence, incomplete removal and infection. Medical Necessity Clause: This procedure was medically necessary because the lesions that were treated were: Detail Level: Detailed Medical Necessity Information: It is in your best interest to select a reason for this procedure from the list below. All of these items fulfill various CMS LCD requirements except the new and changing color options.

## 2024-05-12 NOTE — PROGRESS NOTE ADULT - SUBJECTIVE AND OBJECTIVE BOX
Edson Manzo MD  Academic Hospitalist  Pager [phone]  Email: [email or web address]  Available on [employer] Teams        PROGRESS NOTE:     Patient is a 69y old  Male who presents with a chief complaint of nausea and vomiting (11 May 2024 14:15)      SUBJECTIVE / OVERNIGHT EVENTS:  Patient seen and examined this morning. No further hypoglycemic events over the last 48 hours.  ADDITIONAL REVIEW OF SYSTEMS:  Review of system unobtainable secondary to mental status.    MEDICATIONS  (STANDING):  albuterol/ipratropium for Nebulization 3 milliLiter(s) Nebulizer every 6 hours  ascorbic acid 500 milliGRAM(s) Oral daily  aspirin enteric coated 81 milliGRAM(s) Oral daily  atorvastatin 40 milliGRAM(s) Oral at bedtime  carvedilol 6.25 milliGRAM(s) Oral every 12 hours  chlorhexidine 2% Cloths 1 Application(s) Topical daily  dextrose 50% Injectable 25 Gram(s) IV Push once  dextrose 50% Injectable 12.5 Gram(s) IV Push once  dextrose 50% Injectable 25 Gram(s) IV Push once  dextrose Oral Gel 15 Gram(s) Oral once  glucagon  Injectable 1 milliGRAM(s) IntraMuscular once  heparin   Injectable 5000 Unit(s) SubCutaneous every 12 hours  lactobacillus acidophilus 1 Tablet(s) Oral daily  lactulose Syrup 10 Gram(s) Oral daily  Nephro-hannah 1 Tablet(s) Oral daily  pantoprazole    Tablet 40 milliGRAM(s) Oral before breakfast  polyethylene glycol 3350 17 Gram(s) Oral daily  senna 2 Tablet(s) Oral at bedtime  sodium chloride 3%  Inhalation 4 milliLiter(s) Inhalation every 6 hours    MEDICATIONS  (PRN):  acetaminophen     Tablet .. 650 milliGRAM(s) Oral every 6 hours PRN Temp greater or equal to 38C (100.4F), Mild Pain (1 - 3)  aluminum hydroxide/magnesium hydroxide/simethicone Suspension 30 milliLiter(s) Oral every 4 hours PRN Dyspepsia  melatonin 3 milliGRAM(s) Oral at bedtime PRN Insomnia  ondansetron Injectable 4 milliGRAM(s) IV Push every 8 hours PRN Nausea and/or Vomiting  oxyCODONE    IR 5 milliGRAM(s) Oral every 6 hours PRN moderate to severe pain  sodium chloride 0.9% Bolus. 100 milliLiter(s) IV Bolus every 5 minutes PRN SBP LESS THAN or EQUAL to 80 mmHg      CAPILLARY BLOOD GLUCOSE      POCT Blood Glucose.: 137 mg/dL (12 May 2024 10:04)  POCT Blood Glucose.: 112 mg/dL (12 May 2024 06:19)  POCT Blood Glucose.: 129 mg/dL (12 May 2024 00:33)  POCT Blood Glucose.: 115 mg/dL (11 May 2024 17:09)    I&O's Summary    11 May 2024 07:01  -  12 May 2024 07:00  --------------------------------------------------------  IN: 120 mL / OUT: 0 mL / NET: 120 mL        PHYSICAL EXAM:  Vital Signs Last 24 Hrs  T(C): 36.3 (12 May 2024 09:46), Max: 37.1 (12 May 2024 06:14)  T(F): 97.4 (12 May 2024 09:46), Max: 98.8 (12 May 2024 06:14)  HR: 75 (12 May 2024 09:46) (69 - 79)  BP: 172/75 (12 May 2024 09:46) (125/62 - 185/81)  BP(mean): --  RR: 18 (12 May 2024 09:46) (18 - 18)  SpO2: 95% (12 May 2024 09:46) (95% - 100%)    Parameters below as of 12 May 2024 09:46  Patient On (Oxygen Delivery Method): room air      GENERAL: NAD, lying in bed, awake, interactive however, short answers, very confused  CHEST/LUNG: No use of accessory muscles, CTAB, breathing non-labored  COR: RR, no mrcg  ABD: Soft, ND/NT, +BS  PSYCH: AAOxself  SKIN: No rashes or lesions  EXT: wwp, s/p b/l AKA and BKA    LABS:                        10.6   3.30  )-----------( 177      ( 11 May 2024 07:26 )             33.2     05-12    130<L>  |  88<L>  |  27<H>  ----------------------------<  107<H>  5.2   |  24  |  4.53<H>    Ca    9.8      12 May 2024 06:30  Phos  3.0     05-12  Mg     2.60     05-12            Urinalysis Basic - ( 12 May 2024 06:30 )    Color: x / Appearance: x / SG: x / pH: x  Gluc: 107 mg/dL / Ketone: x  / Bili: x / Urobili: x   Blood: x / Protein: x / Nitrite: x   Leuk Esterase: x / RBC: x / WBC x   Sq Epi: x / Non Sq Epi: x / Bacteria: x          RADIOLOGY & ADDITIONAL TESTS:  Results Reviewed:   Imaging Personally Reviewed:  Electrocardiogram Personally Reviewed:    COORDINATION OF CARE:  Care Discussed with Consultants/Other Providers [Y/N]:  Prior or Outpatient Records Reviewed [Y/N]:

## 2024-05-12 NOTE — PROGRESS NOTE ADULT - ASSESSMENT
69-year-old male with PMH end-stage renal on HD via left aVF, CVA, BKA/AKA, functionally quadriplegic, CAD, HTN, HLD, history of O2 as needed presents for evaluation of vomiting.  Brought from HD, started vomiting while receiving treatment.  Patient unable to endorse any specific complaints, per paperwork AO x 1/0 at baseline.  Patient denies chest pain or difficulty breathing, unable to really clarify about abdominal pain. Nephrology consulted for dialysis needs.    A/P   ESRD  Center: [place]  Nephrologist: Dr. Navarro   Access: L AVF  MWF schedule.  Consent obtained from niece, and proxy, Ramonita Vincent via phone [phone]  s/p HD 5/5 due high K+    s/p HD 5/6 .  s/p HD 5/10 UF 1339  Plan for HD tomorrow.  C/W MWF schedule inpatient.  Renal diet  Monitor BMP     HTN   BP fluctuating; suboptimal.  Low salt diet.  Consider increasing carvedilol to 12.5mg BID if BP remains suboptimal post HD.  UF with HD.  Monitor BP     Anemia  At goal.  TAINA w/ HD; hold for Hgb >11.  Not iron deficient.  Monitor Hb     Hyperkalemia   s/p HD 5/5, 5/6  K high normal.  low K diet   Lokelma 10gm if K >5.3 on nonHD days  HD as scheduled.  monitor     Hyponatremia   s/p HD 5/10  Fluid restriction to 1L/day.  UF w/ HD.  monitor     Acidosis   non AG + AG  Improved w/ HD.  monitor     CKD-MBD   - at goal.  PO4 WNL  Off binder.  Monitor Ca, PO4 daily     Nausea/Vomiting  CT A/P shows constipation and wall thickening, worrisome for stercoral colitis  s/p disimpaction.  Colorectal surgery f/u

## 2024-05-12 NOTE — PROGRESS NOTE ADULT - SUBJECTIVE AND OBJECTIVE BOX
[facility] [place] [facility]   MD MARIBELL CARRION MD ANGELA WONG, PA QIAN CHEN, SHAVONNE    TEL:  OFFICE: [phone]  From 5pm-7am Answering Service [phone]    -- RENAL FOLLOW UP NOTE ---Date of Service 05-12-24 @ 13:27    Patient is a 69y old  Male who presents with a chief complaint of nausea and vomiting.    Patient seen and examined at bedside. No chest pain/SOB.    VITALS:  T(F): 97.4 (05-12-24 @ 09:46), Max: 98.8 (05-12-24 @ 06:14)  HR: 75 (05-12-24 @ 09:46)  BP: 172/75 (05-12-24 @ 09:46)  RR: 18 (05-12-24 @ 09:46)  SpO2: 95% (05-12-24 @ 09:46)  Wt(kg): --    05-11 @ 07:01  -  05-12 @ 07:00  --------------------------------------------------------  IN: 120 mL / OUT: 0 mL / NET: 120 mL      PHYSICAL EXAM:  General: NAD  Neck: No JVD  Respiratory: CTAB, no wheezes, rales or rhonchi  Cardiovascular: S1, S2, RRR  Gastrointestinal: BS+, soft, NT/ND  Extremities: No peripheral edema    Hospital Medications:   MEDICATIONS  (STANDING):  albuterol/ipratropium for Nebulization 3 milliLiter(s) Nebulizer every 6 hours  ascorbic acid 500 milliGRAM(s) Oral daily  aspirin enteric coated 81 milliGRAM(s) Oral daily  atorvastatin 40 milliGRAM(s) Oral at bedtime  carvedilol 6.25 milliGRAM(s) Oral every 12 hours  chlorhexidine 2% Cloths 1 Application(s) Topical daily  dextrose 50% Injectable 25 Gram(s) IV Push once  dextrose 50% Injectable 12.5 Gram(s) IV Push once  dextrose 50% Injectable 25 Gram(s) IV Push once  dextrose Oral Gel 15 Gram(s) Oral once  glucagon  Injectable 1 milliGRAM(s) IntraMuscular once  heparin   Injectable 5000 Unit(s) SubCutaneous every 12 hours  lactobacillus acidophilus 1 Tablet(s) Oral daily  lactulose Syrup 10 Gram(s) Oral daily  Nephro-hannah 1 Tablet(s) Oral daily  pantoprazole    Tablet 40 milliGRAM(s) Oral before breakfast  polyethylene glycol 3350 17 Gram(s) Oral daily  senna 2 Tablet(s) Oral at bedtime  sodium chloride 3%  Inhalation 4 milliLiter(s) Inhalation every 6 hours      LABS:  05-12    130<L>  |  88<L>  |  27<H>  ----------------------------<  107<H>  5.2   |  24  |  4.53<H>    Ca    9.8      12 May 2024 06:30  Phos  3.0     05-12  Mg     2.60     05-12      Creatinine Trend: 4.53 <--, 3.58 <--, 4.22 <--, 3.14 <--, 4.10 <--, 2.69 <--, 3.73 <--, 3.28 <--, 2.70 <--    Phosphorus: 3.0 mg/dL (05-12 @ 06:30)                          10.6   3.30  )-----------( 177      ( 11 May 2024 07:26 )             33.2     Urine Studies:  Urinalysis - [05-12-24 @ 06:30]      Color  / Appearance  / SG  / pH       Gluc 107 / Ketone   / Bili  / Urobili        Blood  / Protein  / Leuk Est  / Nitrite       RBC  / WBC  / Hyaline  / Gran  / Sq Epi  / Non Sq Epi  / Bacteria       Iron 42, TIBC 127, %sat 33      [05-11-24 @ 07:26]  Ferritin 1680      [05-11-24 @ 07:26]  PTH -- (Ca --)      [04-28-24 @ 05:00]   155  HbA1c 4.2      [07-19-19 @ 09:56]  TSH 1.96      [05-07-24 @ 04:54]  Lipid: chol 119, TG 66, HDL 46, LDL --      [10-07-23 @ 09:30]    HBsAb 204.3      [05-03-24 @ 11:25]  HBsAg Nonreact      [05-04-24 @ 16:30]  HBcAb Nonreact      [05-03-24 @ 11:25]  HCV 0.12, Nonreact      [05-04-24 @ 16:30]

## 2024-05-13 LAB
ANION GAP SERPL CALC-SCNC: 17 MMOL/L — HIGH (ref 7–14)
BUN SERPL-MCNC: 40 MG/DL — HIGH (ref 7–23)
CALCIUM SERPL-MCNC: 9.5 MG/DL — SIGNIFICANT CHANGE UP (ref 8.4–10.5)
CHLORIDE SERPL-SCNC: 91 MMOL/L — LOW (ref 98–107)
CO2 SERPL-SCNC: 24 MMOL/L — SIGNIFICANT CHANGE UP (ref 22–31)
CREAT SERPL-MCNC: 5.59 MG/DL — HIGH (ref 0.5–1.3)
EGFR: 10 ML/MIN/1.73M2 — LOW
GLUCOSE BLDC GLUCOMTR-MCNC: 100 MG/DL — HIGH (ref 70–99)
GLUCOSE BLDC GLUCOMTR-MCNC: 105 MG/DL — HIGH (ref 70–99)
GLUCOSE BLDC GLUCOMTR-MCNC: 127 MG/DL — HIGH (ref 70–99)
GLUCOSE BLDC GLUCOMTR-MCNC: 142 MG/DL — HIGH (ref 70–99)
GLUCOSE BLDC GLUCOMTR-MCNC: 81 MG/DL — SIGNIFICANT CHANGE UP (ref 70–99)
GLUCOSE BLDC GLUCOMTR-MCNC: 84 MG/DL — SIGNIFICANT CHANGE UP (ref 70–99)
GLUCOSE BLDC GLUCOMTR-MCNC: 90 MG/DL — SIGNIFICANT CHANGE UP (ref 70–99)
GLUCOSE SERPL-MCNC: 70 MG/DL — SIGNIFICANT CHANGE UP (ref 70–99)
HCT VFR BLD CALC: 34.4 % — LOW (ref 39–50)
HGB BLD-MCNC: 11.3 G/DL — LOW (ref 13–17)
MAGNESIUM SERPL-MCNC: 2.9 MG/DL — HIGH (ref 1.6–2.6)
MCHC RBC-ENTMCNC: 27.2 PG — SIGNIFICANT CHANGE UP (ref 27–34)
MCHC RBC-ENTMCNC: 32.8 GM/DL — SIGNIFICANT CHANGE UP (ref 32–36)
MCV RBC AUTO: 82.9 FL — SIGNIFICANT CHANGE UP (ref 80–100)
NRBC # BLD: 0 /100 WBCS — SIGNIFICANT CHANGE UP (ref 0–0)
NRBC # FLD: 0 K/UL — SIGNIFICANT CHANGE UP (ref 0–0)
PHOSPHATE SERPL-MCNC: 3.5 MG/DL — SIGNIFICANT CHANGE UP (ref 2.5–4.5)
PLATELET # BLD AUTO: 199 K/UL — SIGNIFICANT CHANGE UP (ref 150–400)
POTASSIUM SERPL-MCNC: 5.8 MMOL/L — HIGH (ref 3.5–5.3)
POTASSIUM SERPL-SCNC: 5.8 MMOL/L — HIGH (ref 3.5–5.3)
RBC # BLD: 4.15 M/UL — LOW (ref 4.2–5.8)
RBC # FLD: 18.6 % — HIGH (ref 10.3–14.5)
SODIUM SERPL-SCNC: 132 MMOL/L — LOW (ref 135–145)
VANCOMYCIN FLD-MCNC: 14 UG/ML — SIGNIFICANT CHANGE UP
WBC # BLD: 3.29 K/UL — LOW (ref 3.8–10.5)
WBC # FLD AUTO: 3.29 K/UL — LOW (ref 3.8–10.5)

## 2024-05-13 PROCEDURE — 99232 SBSQ HOSP IP/OBS MODERATE 35: CPT

## 2024-05-13 RX ORDER — VANCOMYCIN HCL 1 G
500 VIAL (EA) INTRAVENOUS ONCE
Refills: 0 | Status: COMPLETED | OUTPATIENT
Start: 2024-05-13 | End: 2024-05-13

## 2024-05-13 RX ADMIN — HEPARIN SODIUM 5000 UNIT(S): 5000 INJECTION INTRAVENOUS; SUBCUTANEOUS at 05:26

## 2024-05-13 RX ADMIN — Medication 500 MILLIGRAM(S): at 13:21

## 2024-05-13 RX ADMIN — CARVEDILOL PHOSPHATE 6.25 MILLIGRAM(S): 80 CAPSULE, EXTENDED RELEASE ORAL at 18:36

## 2024-05-13 RX ADMIN — HEPARIN SODIUM 5000 UNIT(S): 5000 INJECTION INTRAVENOUS; SUBCUTANEOUS at 18:36

## 2024-05-13 RX ADMIN — LACTULOSE 10 GRAM(S): 10 SOLUTION ORAL at 13:25

## 2024-05-13 RX ADMIN — Medication 1 TABLET(S): at 13:22

## 2024-05-13 RX ADMIN — CHLORHEXIDINE GLUCONATE 1 APPLICATION(S): 213 SOLUTION TOPICAL at 13:24

## 2024-05-13 RX ADMIN — ATORVASTATIN CALCIUM 40 MILLIGRAM(S): 80 TABLET, FILM COATED ORAL at 23:06

## 2024-05-13 RX ADMIN — Medication 3 MILLILITER(S): at 08:19

## 2024-05-13 RX ADMIN — ERYTHROPOIETIN 10000 UNIT(S): 10000 INJECTION, SOLUTION INTRAVENOUS; SUBCUTANEOUS at 07:25

## 2024-05-13 RX ADMIN — SODIUM CHLORIDE 4 MILLILITER(S): 9 INJECTION INTRAMUSCULAR; INTRAVENOUS; SUBCUTANEOUS at 23:42

## 2024-05-13 RX ADMIN — PANTOPRAZOLE SODIUM 40 MILLIGRAM(S): 20 TABLET, DELAYED RELEASE ORAL at 05:26

## 2024-05-13 RX ADMIN — SODIUM CHLORIDE 4 MILLILITER(S): 9 INJECTION INTRAMUSCULAR; INTRAVENOUS; SUBCUTANEOUS at 15:32

## 2024-05-13 RX ADMIN — SODIUM CHLORIDE 4 MILLILITER(S): 9 INJECTION INTRAMUSCULAR; INTRAVENOUS; SUBCUTANEOUS at 08:18

## 2024-05-13 RX ADMIN — Medication 1 TABLET(S): at 13:21

## 2024-05-13 RX ADMIN — SENNA PLUS 2 TABLET(S): 8.6 TABLET ORAL at 23:06

## 2024-05-13 RX ADMIN — POLYETHYLENE GLYCOL 3350 17 GRAM(S): 17 POWDER, FOR SOLUTION ORAL at 13:57

## 2024-05-13 RX ADMIN — Medication 3 MILLILITER(S): at 20:23

## 2024-05-13 RX ADMIN — Medication 100 MILLIGRAM(S): at 15:02

## 2024-05-13 RX ADMIN — Medication 81 MILLIGRAM(S): at 13:22

## 2024-05-13 RX ADMIN — Medication 3 MILLILITER(S): at 15:30

## 2024-05-13 NOTE — PROGRESS NOTE ADULT - ASSESSMENT
69-year-old male with PMH end-stage renal on HD via left aVF, CVA, BKA/AKA, functionally quadriplegic, CAD, HTN, HLD, history of O2 as needed presents for evaluation of vomiting.  Brought from HD, started vomiting while receiving treatment.  Patient unable to endorse any specific complaints, per paperwork AO x 1/0 at baseline.  Patient denies chest pain or difficulty breathing, unable to really clarify about abdominal pain. Nephrology consulted for dialysis needs.    A/P   ESRD  Center: [place]  Nephrologist: Dr. Navarro   Access: L AVF  MWF schedule.  Consent obtained from niece, and proxy, Ramonita Vincent via phone [phone]  s/p HD 5/5 due high K+    s/p HD 5/6 .  s/p HD 5/10 UF 1339  s/p HD 5/13 UF 1L  C/W MWF schedule inpatient.  Renal diet  Monitor BMP     HTN   BP fluctuating;   Low salt diet.  Consider increasing carvedilol to 12.5mg BID if BP remains suboptimal post HD.  UF with HD.  Monitor BP     Anemia  At goal.  TAINA w/ HD; hold for Hgb >11.  Not iron deficient.  Monitor Hb     Hyperkalemia   s/p HD 5/5, 5/6  K high normal.  low K diet   Lokelma 10gm if K >5.3 on nonHD days  HD as scheduled.  monitor     Hyponatremia   s/p HD 5/13  Fluid restriction to 1L/day.  UF w/ HD.  monitor     Acidosis   non AG + AG  Improved w/ HD.  monitor     CKD-MBD   - at goal.  PO4 WNL  Off binder.  Monitor Ca, PO4 daily     Nausea/Vomiting  CT A/P shows constipation and wall thickening, worrisome for stercoral colitis  s/p disimpaction.  Colorectal surgery f/u

## 2024-05-13 NOTE — PROGRESS NOTE ADULT - SUBJECTIVE AND OBJECTIVE BOX
Ana Cristina Thompson        Patient is a 69y old  Male who presents with a chief complaint of nausea and vomiting (13 May 2024 14:34)      SUBJECTIVE / OVERNIGHT EVENTS: No acute overnight events. This morning pt doing well. Reports generalized pain but didn't ask for PRN meds as he did not know he had them. Otherwise no complaints.       MEDICATIONS  (STANDING):  albuterol/ipratropium for Nebulization 3 milliLiter(s) Nebulizer every 6 hours  ascorbic acid 500 milliGRAM(s) Oral daily  aspirin enteric coated 81 milliGRAM(s) Oral daily  atorvastatin 40 milliGRAM(s) Oral at bedtime  carvedilol 6.25 milliGRAM(s) Oral every 12 hours  chlorhexidine 2% Cloths 1 Application(s) Topical daily  dextrose 50% Injectable 25 Gram(s) IV Push once  dextrose 50% Injectable 12.5 Gram(s) IV Push once  dextrose 50% Injectable 25 Gram(s) IV Push once  dextrose Oral Gel 15 Gram(s) Oral once  epoetin dinah (EPOGEN) Injectable 01673 Unit(s) IV Push <User Schedule>  glucagon  Injectable 1 milliGRAM(s) IntraMuscular once  heparin   Injectable 5000 Unit(s) SubCutaneous every 12 hours  lactobacillus acidophilus 1 Tablet(s) Oral daily  lactulose Syrup 10 Gram(s) Oral daily  Nephro-hannah 1 Tablet(s) Oral daily  pantoprazole    Tablet 40 milliGRAM(s) Oral before breakfast  polyethylene glycol 3350 17 Gram(s) Oral daily  senna 2 Tablet(s) Oral at bedtime  sodium chloride 3%  Inhalation 4 milliLiter(s) Inhalation every 6 hours    MEDICATIONS  (PRN):  acetaminophen     Tablet .. 650 milliGRAM(s) Oral every 6 hours PRN Temp greater or equal to 38C (100.4F), Mild Pain (1 - 3)  aluminum hydroxide/magnesium hydroxide/simethicone Suspension 30 milliLiter(s) Oral every 4 hours PRN Dyspepsia  melatonin 3 milliGRAM(s) Oral at bedtime PRN Insomnia  ondansetron Injectable 4 milliGRAM(s) IV Push every 8 hours PRN Nausea and/or Vomiting  oxyCODONE    IR 5 milliGRAM(s) Oral every 6 hours PRN moderate to severe pain  sodium chloride 0.9% Bolus. 100 milliLiter(s) IV Bolus every 5 minutes PRN SBP LESS THAN or EQUAL to 80 mmHg    Allergies    No Known Allergies    Intolerances        Vital Signs Last 24 Hrs  T(C): 36.6 (13 May 2024 16:30), Max: 36.9 (13 May 2024 05:26)  T(F): 97.8 (13 May 2024 16:30), Max: 98.4 (13 May 2024 05:26)  HR: 82 (13 May 2024 16:30) (65 - 82)  BP: 105/67 (13 May 2024 16:30) (102/62 - 163/71)  BP(mean): --  RR: 18 (13 May 2024 16:30) (17 - 18)  SpO2: 100% (13 May 2024 16:30) (97% - 100%)    Parameters below as of 13 May 2024 16:30  Patient On (Oxygen Delivery Method): room air      Daily     Daily Weight in k.6 (13 May 2024 09:50)  CAPILLARY BLOOD GLUCOSE      POCT Blood Glucose.: 127 mg/dL (13 May 2024 16:40)  POCT Blood Glucose.: 142 mg/dL (13 May 2024 11:24)  POCT Blood Glucose.: 105 mg/dL (13 May 2024 09:23)  POCT Blood Glucose.: 84 mg/dL (13 May 2024 07:47)  POCT Blood Glucose.: 90 mg/dL (13 May 2024 05:06)  POCT Blood Glucose.: 100 mg/dL (13 May 2024 00:48)  POCT Blood Glucose.: 101 mg/dL (12 May 2024 20:48)    I&O's Summary    13 May 2024 07:01  -  13 May 2024 18:58  --------------------------------------------------------  IN: 500 mL / OUT: 1500 mL / NET: -1000 mL        PHYSICAL EXAM:  GENERAL: NAD  CHEST/LUNG: CTAB, breathing non-labored  COR: RR, no mrcg  ABD: Soft, ND/NT, +BS  PSYCH: AAOx 1  SKIN: No rashes or lesions  EXT: wwp, s/p b/l AKA and BKA    LABS:                        11.3   3.29  )-----------( 199      ( 13 May 2024 05:50 )             34.4     Hgb Trend: 11.3<--, 10.6<--, 10.9<--, 11.1<--, 10.7<--  05-13    132<L>  |  91<L>  |  40<H>  ----------------------------<  70  5.8<H>   |  24  |  5.59<H>    Ca    9.5      13 May 2024 05:50  Phos  3.5     05  Mg     2.90     13      Creatinine Trend: 5.59<--, 4.53<--, 3.58<--, 4.22<--, 3.14<--, 4.10<--          Urinalysis Basic - ( 13 May 2024 05:50 )    Color: x / Appearance: x / SG: x / pH: x  Gluc: 70 mg/dL / Ketone: x  / Bili: x / Urobili: x   Blood: x / Protein: x / Nitrite: x   Leuk Esterase: x / RBC: x / WBC x   Sq Epi: x / Non Sq Epi: x / Bacteria: x        RADIOLOGY & ADDITIONAL TESTS:    Imaging Personally Reviewed.    Consultant(s) Notes Reviewed.    Care Discussed with Consultants/Other Providers.

## 2024-05-14 LAB
ANION GAP SERPL CALC-SCNC: 10 MMOL/L — SIGNIFICANT CHANGE UP (ref 7–14)
BUN SERPL-MCNC: 28 MG/DL — HIGH (ref 7–23)
CALCIUM SERPL-MCNC: 9.1 MG/DL — SIGNIFICANT CHANGE UP (ref 8.4–10.5)
CHLORIDE SERPL-SCNC: 97 MMOL/L — LOW (ref 98–107)
CO2 SERPL-SCNC: 30 MMOL/L — SIGNIFICANT CHANGE UP (ref 22–31)
CREAT SERPL-MCNC: 3.94 MG/DL — HIGH (ref 0.5–1.3)
EGFR: 16 ML/MIN/1.73M2 — LOW
GLUCOSE BLDC GLUCOMTR-MCNC: 121 MG/DL — HIGH (ref 70–99)
GLUCOSE BLDC GLUCOMTR-MCNC: 132 MG/DL — HIGH (ref 70–99)
GLUCOSE BLDC GLUCOMTR-MCNC: 146 MG/DL — HIGH (ref 70–99)
GLUCOSE BLDC GLUCOMTR-MCNC: 67 MG/DL — LOW (ref 70–99)
GLUCOSE BLDC GLUCOMTR-MCNC: 69 MG/DL — LOW (ref 70–99)
GLUCOSE BLDC GLUCOMTR-MCNC: 80 MG/DL — SIGNIFICANT CHANGE UP (ref 70–99)
GLUCOSE SERPL-MCNC: 69 MG/DL — LOW (ref 70–99)
HCT VFR BLD CALC: 33.3 % — LOW (ref 39–50)
HGB BLD-MCNC: 10.4 G/DL — LOW (ref 13–17)
MAGNESIUM SERPL-MCNC: 2.6 MG/DL — SIGNIFICANT CHANGE UP (ref 1.6–2.6)
MCHC RBC-ENTMCNC: 26.5 PG — LOW (ref 27–34)
MCHC RBC-ENTMCNC: 31.2 GM/DL — LOW (ref 32–36)
MCV RBC AUTO: 84.7 FL — SIGNIFICANT CHANGE UP (ref 80–100)
NRBC # BLD: 0 /100 WBCS — SIGNIFICANT CHANGE UP (ref 0–0)
NRBC # FLD: 0 K/UL — SIGNIFICANT CHANGE UP (ref 0–0)
PHOSPHATE SERPL-MCNC: 2.5 MG/DL — SIGNIFICANT CHANGE UP (ref 2.5–4.5)
PLATELET # BLD AUTO: 191 K/UL — SIGNIFICANT CHANGE UP (ref 150–400)
POTASSIUM SERPL-MCNC: 4.3 MMOL/L — SIGNIFICANT CHANGE UP (ref 3.5–5.3)
POTASSIUM SERPL-SCNC: 4.3 MMOL/L — SIGNIFICANT CHANGE UP (ref 3.5–5.3)
RBC # BLD: 3.93 M/UL — LOW (ref 4.2–5.8)
RBC # FLD: 18.7 % — HIGH (ref 10.3–14.5)
SODIUM SERPL-SCNC: 137 MMOL/L — SIGNIFICANT CHANGE UP (ref 135–145)
WBC # BLD: 3.87 K/UL — SIGNIFICANT CHANGE UP (ref 3.8–10.5)
WBC # FLD AUTO: 3.87 K/UL — SIGNIFICANT CHANGE UP (ref 3.8–10.5)

## 2024-05-14 PROCEDURE — 99232 SBSQ HOSP IP/OBS MODERATE 35: CPT

## 2024-05-14 RX ORDER — DEXTROSE 50 % IN WATER 50 %
15 SYRINGE (ML) INTRAVENOUS ONCE
Refills: 0 | Status: COMPLETED | OUTPATIENT
Start: 2024-05-14 | End: 2024-05-14

## 2024-05-14 RX ADMIN — HEPARIN SODIUM 5000 UNIT(S): 5000 INJECTION INTRAVENOUS; SUBCUTANEOUS at 17:12

## 2024-05-14 RX ADMIN — Medication 500 MILLIGRAM(S): at 12:34

## 2024-05-14 RX ADMIN — SODIUM CHLORIDE 4 MILLILITER(S): 9 INJECTION INTRAMUSCULAR; INTRAVENOUS; SUBCUTANEOUS at 10:17

## 2024-05-14 RX ADMIN — LACTULOSE 10 GRAM(S): 10 SOLUTION ORAL at 12:33

## 2024-05-14 RX ADMIN — ATORVASTATIN CALCIUM 40 MILLIGRAM(S): 80 TABLET, FILM COATED ORAL at 21:06

## 2024-05-14 RX ADMIN — PANTOPRAZOLE SODIUM 40 MILLIGRAM(S): 20 TABLET, DELAYED RELEASE ORAL at 06:59

## 2024-05-14 RX ADMIN — Medication 3 MILLILITER(S): at 16:30

## 2024-05-14 RX ADMIN — Medication 15 GRAM(S): at 16:45

## 2024-05-14 RX ADMIN — Medication 3 MILLILITER(S): at 10:16

## 2024-05-14 RX ADMIN — CARVEDILOL PHOSPHATE 6.25 MILLIGRAM(S): 80 CAPSULE, EXTENDED RELEASE ORAL at 06:59

## 2024-05-14 RX ADMIN — SODIUM CHLORIDE 4 MILLILITER(S): 9 INJECTION INTRAMUSCULAR; INTRAVENOUS; SUBCUTANEOUS at 20:33

## 2024-05-14 RX ADMIN — SODIUM CHLORIDE 4 MILLILITER(S): 9 INJECTION INTRAMUSCULAR; INTRAVENOUS; SUBCUTANEOUS at 16:30

## 2024-05-14 RX ADMIN — CHLORHEXIDINE GLUCONATE 1 APPLICATION(S): 213 SOLUTION TOPICAL at 12:34

## 2024-05-14 RX ADMIN — POLYETHYLENE GLYCOL 3350 17 GRAM(S): 17 POWDER, FOR SOLUTION ORAL at 12:34

## 2024-05-14 RX ADMIN — HEPARIN SODIUM 5000 UNIT(S): 5000 INJECTION INTRAVENOUS; SUBCUTANEOUS at 06:59

## 2024-05-14 RX ADMIN — Medication 1 TABLET(S): at 12:34

## 2024-05-14 RX ADMIN — Medication 3 MILLILITER(S): at 20:34

## 2024-05-14 RX ADMIN — SENNA PLUS 2 TABLET(S): 8.6 TABLET ORAL at 21:06

## 2024-05-14 RX ADMIN — CARVEDILOL PHOSPHATE 6.25 MILLIGRAM(S): 80 CAPSULE, EXTENDED RELEASE ORAL at 17:12

## 2024-05-14 RX ADMIN — Medication 81 MILLIGRAM(S): at 12:34

## 2024-05-14 NOTE — PROVIDER CONTACT NOTE (OTHER) - ASSESSMENT
Vital signs:  /77  HR 79  Temp 98.8  SpO2 98%  No s/s of distress noticed.
Not acute distress noted, /63 HR 68. FS 73 pre dialysis.
Not acute distress. No respiratory distress noted. Denies any pain/discomfort or SOB.
patient was resting on bed AOx1, no acute distress noted, no s/s of chest pain and SOB noted. patient was noted with fluctuated O2 sat between 70s% to 100% on room air on tele. BP and temp WNL. patient is asymptomatic.

## 2024-05-14 NOTE — PROVIDER CONTACT NOTE (OTHER) - ACTION/TREATMENT ORDERED:
provider aware, keep patient on Oxygen 2L/min via nasal cannula, f/u O2 sat 100%. continue to monitor.

## 2024-05-14 NOTE — PROVIDER CONTACT NOTE (HYPOGLYCEMIA EVENT) - NS PROVIDER CONTACT ASSESS-HYPO
patient was resting on bed AOx1 at baseline, lethargic, no s/s of chest pain and palpitation noted, mild sweating, no c/o dizzy or headache. vitals stable, NSR on tele, O2 sat maintained above 92% on oxygen 2L/min. dextrose Oral Gel 15 gram administered as per protocol, recheck within 30min, blood sugar 132. oral intake encouraged.

## 2024-05-14 NOTE — PROVIDER CONTACT NOTE (OTHER) - REASON
Patient's /77.
Pt frequent desat, not sustaining
low blood sugar, 73
O2 sat fluctuating between 70s% to 100% on room air

## 2024-05-14 NOTE — PROGRESS NOTE ADULT - SUBJECTIVE AND OBJECTIVE BOX
Ana Cristina Thompson        Patient is a 69y old  Male who presents with a chief complaint of nausea and vomiting (14 May 2024 14:55)      SUBJECTIVE / OVERNIGHT EVENTS: No acute overnight events. This morning pt doing well, no new complaints    MEDICATIONS  (STANDING):  albuterol/ipratropium for Nebulization 3 milliLiter(s) Nebulizer every 6 hours  ascorbic acid 500 milliGRAM(s) Oral daily  aspirin enteric coated 81 milliGRAM(s) Oral daily  atorvastatin 40 milliGRAM(s) Oral at bedtime  carvedilol 6.25 milliGRAM(s) Oral every 12 hours  chlorhexidine 2% Cloths 1 Application(s) Topical daily  dextrose 50% Injectable 25 Gram(s) IV Push once  dextrose 50% Injectable 12.5 Gram(s) IV Push once  dextrose 50% Injectable 25 Gram(s) IV Push once  dextrose Oral Gel 15 Gram(s) Oral once  epoetin dinah (EPOGEN) Injectable 63684 Unit(s) IV Push <User Schedule>  glucagon  Injectable 1 milliGRAM(s) IntraMuscular once  heparin   Injectable 5000 Unit(s) SubCutaneous every 12 hours  lactobacillus acidophilus 1 Tablet(s) Oral daily  lactulose Syrup 10 Gram(s) Oral daily  Nephro-hannah 1 Tablet(s) Oral daily  pantoprazole    Tablet 40 milliGRAM(s) Oral before breakfast  polyethylene glycol 3350 17 Gram(s) Oral daily  senna 2 Tablet(s) Oral at bedtime  sodium chloride 3%  Inhalation 4 milliLiter(s) Inhalation every 6 hours    MEDICATIONS  (PRN):  acetaminophen     Tablet .. 650 milliGRAM(s) Oral every 6 hours PRN Temp greater or equal to 38C (100.4F), Mild Pain (1 - 3)  aluminum hydroxide/magnesium hydroxide/simethicone Suspension 30 milliLiter(s) Oral every 4 hours PRN Dyspepsia  melatonin 3 milliGRAM(s) Oral at bedtime PRN Insomnia  ondansetron Injectable 4 milliGRAM(s) IV Push every 8 hours PRN Nausea and/or Vomiting  oxyCODONE    IR 5 milliGRAM(s) Oral every 6 hours PRN moderate to severe pain  sodium chloride 0.9% Bolus. 100 milliLiter(s) IV Bolus every 5 minutes PRN SBP LESS THAN or EQUAL to 80 mmHg    Allergies    No Known Allergies    Intolerances        Vital Signs Last 24 Hrs  T(C): 36.3 (14 May 2024 17:19), Max: 36.8 (14 May 2024 06:02)  T(F): 97.3 (14 May 2024 17:19), Max: 98.3 (14 May 2024 06:02)  HR: 72 (14 May 2024 17:19) (71 - 83)  BP: 126/66 (14 May 2024 17:19) (113/63 - 127/64)  BP(mean): --  RR: 18 (14 May 2024 17:19) (18 - 18)  SpO2: 100% (14 May 2024 17:19) (95% - 100%)    Parameters below as of 14 May 2024 17:19  Patient On (Oxygen Delivery Method): nasal cannula      Daily     Daily   CAPILLARY BLOOD GLUCOSE      POCT Blood Glucose.: 132 mg/dL (14 May 2024 17:19)  POCT Blood Glucose.: 67 mg/dL (14 May 2024 16:36)  POCT Blood Glucose.: 69 mg/dL (14 May 2024 16:34)  POCT Blood Glucose.: 121 mg/dL (14 May 2024 11:16)  POCT Blood Glucose.: 80 mg/dL (14 May 2024 08:58)  POCT Blood Glucose.: 81 mg/dL (13 May 2024 21:03)    I&O's Summary    13 May 2024 07:01  -  14 May 2024 07:00  --------------------------------------------------------  IN: 500 mL / OUT: 1500 mL / NET: -1000 mL    14 May 2024 07:01  -  14 May 2024 17:24  --------------------------------------------------------  IN: 360 mL / OUT: 0 mL / NET: 360 mL        PHYSICAL EXAM:  GENERAL: NAD  CHEST/LUNG: CTAB, breathing non-labored  COR: RR, no mrcg  ABD: Soft, ND/NT, +BS  PSYCH: AAOx 1  SKIN: No rashes or lesions  EXT: wwp, s/p b/l AKA and BKA    LABS:                        10.4   3.87  )-----------( 191      ( 14 May 2024 06:04 )             33.3     Hgb Trend: 10.4<--, 11.3<--, 10.6<--, 10.9<--, 11.1<--  05-14    137  |  97<L>  |  28<H>  ----------------------------<  69<L>  4.3   |  30  |  3.94<H>    Ca    9.1      14 May 2024 06:04  Phos  2.5     05-14  Mg     2.60     05-14      Creatinine Trend: 3.94<--, 5.59<--, 4.53<--, 3.58<--, 4.22<--, 3.14<--          Urinalysis Basic - ( 14 May 2024 06:04 )    Color: x / Appearance: x / SG: x / pH: x  Gluc: 69 mg/dL / Ketone: x  / Bili: x / Urobili: x   Blood: x / Protein: x / Nitrite: x   Leuk Esterase: x / RBC: x / WBC x   Sq Epi: x / Non Sq Epi: x / Bacteria: x        RADIOLOGY & ADDITIONAL TESTS:    Imaging Personally Reviewed.    Consultant(s) Notes Reviewed.    Care Discussed with Consultants/Other Providers.

## 2024-05-14 NOTE — PROVIDER CONTACT NOTE (OTHER) - BACKGROUND
Admitted for nausea and vomiting, ESRD
Admitted for nausea and vomiting.
patient was admitted for nausea and vomiting with PMHx of DM, HTN, CKD on HD, HTN
Admitted for nausea and vomiting.

## 2024-05-14 NOTE — PROGRESS NOTE ADULT - ASSESSMENT
69-year-old male with PMH end-stage renal on HD via left aVF, CVA, BKA/AKA, functionally quadriplegic, CAD, HTN, HLD, history of O2 as needed presents for evaluation of vomiting.  Brought from HD, started vomiting while receiving treatment.  Patient unable to endorse any specific complaints, per paperwork AO x 1/0 at baseline.  Patient denies chest pain or difficulty breathing, unable to really clarify about abdominal pain. Nephrology consulted for dialysis needs.    A/P   ESRD  Center: [facility]  Nephrologist: Dr. Navarro   Access: L AVF  MWF schedule.  Consent obtained from niece, and proxy, Ramonita Vincent via phone [phone]  s/p HD 5/5 due high K+    s/p HD 5/6 .  s/p HD 5/10 UF 1339  s/p HD 5/13 UF 1L  C/W MWF schedule inpatient.  Renal diet  Monitor BMP     HTN   BP fluctuating; acceptable  Low salt diet.  Consider increasing carvedilol to 12.5mg BID if BP remains suboptimal post HD.  UF with HD.  Monitor BP     Anemia  At goal.  TAINA w/ HD; hold for Hgb >11.  Not iron deficient.  Monitor Hb     Hyperkalemia   s/p HD 5/5, 5/6  K high normal.  low K diet   Lokelma 10gm if K >5.3 on nonHD days  HD as scheduled.  monitor     Hyponatremia   s/p HD 5/13  Fluid restriction to 1L/day.  UF w/ HD.  monitor     Acidosis   non AG + AG  Improved w/ HD.  monitor     CKD-MBD   - at goal.  PO4 WNL  Off binder.  Monitor Ca, PO4 daily     Nausea/Vomiting  CT A/P shows constipation and wall thickening, worrisome for stercoral colitis  s/p disimpaction.  Colorectal surgery f/u

## 2024-05-14 NOTE — PROGRESS NOTE ADULT - SUBJECTIVE AND OBJECTIVE BOX
[facility]   MD MARIBELL CARRION MD MARIA SANTIAGO, NP    TEL:  OFFICE: [phone]  From 5pm-7am Answering Service [phone]    -- RENAL FOLLOW UP NOTE ---Date of Service 05-14-24 @ 14:55    Patient is a 69y old  Male who presents with a chief complaint of nausea and vomiting    Patient seen and examined at bedside. No chest pain/sob    VITALS:  T(F): 97.9 (05-14-24 @ 09:30), Max: 98.3 (05-14-24 @ 06:02)  HR: 75 (05-14-24 @ 09:30)  BP: 113/63 (05-14-24 @ 09:30)  RR: 18 (05-14-24 @ 09:30)  SpO2: 99% (05-14-24 @ 09:30)  Wt(kg): --    05-13 @ 07:01  -  05-14 @ 07:00  --------------------------------------------------------  IN: 500 mL / OUT: 1500 mL / NET: -1000 mL    05-14 @ 07:01  -  05-14 @ 14:55  --------------------------------------------------------  IN: 360 mL / OUT: 0 mL / NET: 360 mL          PHYSICAL EXAM:  General: NAD  Neck: No JVD  Respiratory: CTAB, no wheezes, rales or rhonchi  Cardiovascular: S1, S2, RRR  Gastrointestinal: BS+, soft, NT/ND  Extremities: R AKA, L BKA    [facility] Medications:   MEDICATIONS  (STANDING):  albuterol/ipratropium for Nebulization 3 milliLiter(s) Nebulizer every 6 hours  ascorbic acid 500 milliGRAM(s) Oral daily  aspirin enteric coated 81 milliGRAM(s) Oral daily  atorvastatin 40 milliGRAM(s) Oral at bedtime  carvedilol 6.25 milliGRAM(s) Oral every 12 hours  chlorhexidine 2% Cloths 1 Application(s) Topical daily  dextrose 50% Injectable 25 Gram(s) IV Push once  dextrose 50% Injectable 25 Gram(s) IV Push once  dextrose 50% Injectable 12.5 Gram(s) IV Push once  dextrose Oral Gel 15 Gram(s) Oral once  epoetin dinah (EPOGEN) Injectable 49392 Unit(s) IV Push <User Schedule>  glucagon  Injectable 1 milliGRAM(s) IntraMuscular once  heparin   Injectable 5000 Unit(s) SubCutaneous every 12 hours  lactobacillus acidophilus 1 Tablet(s) Oral daily  lactulose Syrup 10 Gram(s) Oral daily  Nephro-hannah 1 Tablet(s) Oral daily  pantoprazole    Tablet 40 milliGRAM(s) Oral before breakfast  polyethylene glycol 3350 17 Gram(s) Oral daily  senna 2 Tablet(s) Oral at bedtime  sodium chloride 3%  Inhalation 4 milliLiter(s) Inhalation every 6 hours      LABS:  05-14    137  |  97<L>  |  28<H>  ----------------------------<  69<L>  4.3   |  30  |  3.94<H>    Ca    9.1      14 May 2024 06:04  Phos  2.5     05-14  Mg     2.60     05-14      Creatinine Trend: 3.94 <--, 5.59 <--, 4.53 <--, 3.58 <--, 4.22 <--, 3.14 <--, 4.10 <--    Phosphorus: 2.5 mg/dL (05-14 @ 06:04)                              10.4   3.87  )-----------( 191      ( 14 May 2024 06:04 )             33.3     Urine Studies:  Urinalysis - [05-14-24 @ 06:04]      Color  / Appearance  / SG  / pH       Gluc 69 / Ketone   / Bili  / Urobili        Blood  / Protein  / Leuk Est  / Nitrite       RBC  / WBC  / Hyaline  / Gran  / Sq Epi  / Non Sq Epi  / Bacteria       Iron 42, TIBC 127, %sat 33      [05-11-24 @ 07:26]  Ferritin 1680      [05-11-24 @ 07:26]  PTH -- (Ca --)      [04-28-24 @ 05:00]   155  HbA1c 4.2      [07-19-19 @ 09:56]  TSH 1.96      [05-07-24 @ 04:54]  Lipid: chol 119, TG 66, HDL 46, LDL --      [10-07-23 @ 09:30]    HBsAb 204.3      [05-03-24 @ 11:25]  HBsAg Nonreact      [05-04-24 @ 16:30]  HBcAb Nonreact      [05-03-24 @ 11:25]  HCV 0.12, Nonreact      [05-04-24 @ 16:30]      RADIOLOGY & ADDITIONAL STUDIES:

## 2024-05-14 NOTE — PROVIDER CONTACT NOTE (OTHER) - SITUATION
Patient's /77. Pt AXO2, confused and restless and complaining about headache. Denies any chest pain, SOB or palpitations.
patient was noted with fluctuated O2 sat between 70s% to 100% on room air on tele
Pt noted to frequently desat, not sustaining, to the 70-80s. Goes back up to %. Pt is on RA.
patient arrived to dialysis unit with FS of 73

## 2024-05-14 NOTE — PROVIDER CONTACT NOTE (HYPOGLYCEMIA EVENT) - NS PROVIDER CONTACT NOTE-TREATMENT-HYPO
Dextrose 50% 25 Grams IV Push
Glucose gel 1 tube
Dextrose 50% 12.5 Grams IV Push/4 oz Fruit Juice...

## 2024-05-14 NOTE — PROVIDER CONTACT NOTE (OTHER) - RECOMMENDATIONS
Issac Crawford notified over the phone. PRN PO Tylenol administered.
provider aware
Notify ACP
EMILIA Melgoza notified

## 2024-05-14 NOTE — PROVIDER CONTACT NOTE (HYPOGLYCEMIA EVENT) - NS PROVIDER CONTACT RECOMMEND-HYPO
ACP notified and aware
EMILIA Ellis notified over the phone. D50% 25 grams IVP activated and verified order with pharmacy. D50% 25 grams IVP administered. FS rechecked and resulted 275. 
provider Caro Monson aware, continue to monitor.

## 2024-05-15 LAB
ANION GAP SERPL CALC-SCNC: 11 MMOL/L — SIGNIFICANT CHANGE UP (ref 7–14)
BUN SERPL-MCNC: 38 MG/DL — HIGH (ref 7–23)
CALCIUM SERPL-MCNC: 8.8 MG/DL — SIGNIFICANT CHANGE UP (ref 8.4–10.5)
CHLORIDE SERPL-SCNC: 91 MMOL/L — LOW (ref 98–107)
CO2 SERPL-SCNC: 29 MMOL/L — SIGNIFICANT CHANGE UP (ref 22–31)
CREAT SERPL-MCNC: 5.12 MG/DL — HIGH (ref 0.5–1.3)
EGFR: 11 ML/MIN/1.73M2 — LOW
GLUCOSE BLDC GLUCOMTR-MCNC: 117 MG/DL — HIGH (ref 70–99)
GLUCOSE BLDC GLUCOMTR-MCNC: 204 MG/DL — HIGH (ref 70–99)
GLUCOSE BLDC GLUCOMTR-MCNC: 226 MG/DL — HIGH (ref 70–99)
GLUCOSE BLDC GLUCOMTR-MCNC: 59 MG/DL — LOW (ref 70–99)
GLUCOSE BLDC GLUCOMTR-MCNC: 59 MG/DL — LOW (ref 70–99)
GLUCOSE BLDC GLUCOMTR-MCNC: 81 MG/DL — SIGNIFICANT CHANGE UP (ref 70–99)
GLUCOSE BLDC GLUCOMTR-MCNC: 81 MG/DL — SIGNIFICANT CHANGE UP (ref 70–99)
GLUCOSE BLDC GLUCOMTR-MCNC: 83 MG/DL — SIGNIFICANT CHANGE UP (ref 70–99)
GLUCOSE BLDC GLUCOMTR-MCNC: 88 MG/DL — SIGNIFICANT CHANGE UP (ref 70–99)
GLUCOSE SERPL-MCNC: 148 MG/DL — HIGH (ref 70–99)
HCT VFR BLD CALC: 29.4 % — LOW (ref 39–50)
HGB BLD-MCNC: 9.1 G/DL — LOW (ref 13–17)
MAGNESIUM SERPL-MCNC: 2.6 MG/DL — SIGNIFICANT CHANGE UP (ref 1.6–2.6)
MCHC RBC-ENTMCNC: 26.5 PG — LOW (ref 27–34)
MCHC RBC-ENTMCNC: 31 GM/DL — LOW (ref 32–36)
MCV RBC AUTO: 85.5 FL — SIGNIFICANT CHANGE UP (ref 80–100)
NRBC # BLD: 0 /100 WBCS — SIGNIFICANT CHANGE UP (ref 0–0)
NRBC # FLD: 0 K/UL — SIGNIFICANT CHANGE UP (ref 0–0)
PHOSPHATE SERPL-MCNC: 2.4 MG/DL — LOW (ref 2.5–4.5)
PLATELET # BLD AUTO: 205 K/UL — SIGNIFICANT CHANGE UP (ref 150–400)
POTASSIUM SERPL-MCNC: 4.5 MMOL/L — SIGNIFICANT CHANGE UP (ref 3.5–5.3)
POTASSIUM SERPL-SCNC: 4.5 MMOL/L — SIGNIFICANT CHANGE UP (ref 3.5–5.3)
RBC # BLD: 3.44 M/UL — LOW (ref 4.2–5.8)
RBC # FLD: 19 % — HIGH (ref 10.3–14.5)
SODIUM SERPL-SCNC: 131 MMOL/L — LOW (ref 135–145)
VANCOMYCIN FLD-MCNC: 19.6 UG/ML — SIGNIFICANT CHANGE UP
WBC # BLD: 5.27 K/UL — SIGNIFICANT CHANGE UP (ref 3.8–10.5)
WBC # FLD AUTO: 5.27 K/UL — SIGNIFICANT CHANGE UP (ref 3.8–10.5)

## 2024-05-15 PROCEDURE — 99232 SBSQ HOSP IP/OBS MODERATE 35: CPT

## 2024-05-15 RX ORDER — DEXTROSE 50 % IN WATER 50 %
25 SYRINGE (ML) INTRAVENOUS ONCE
Refills: 0 | Status: COMPLETED | OUTPATIENT
Start: 2024-05-15 | End: 2024-05-15

## 2024-05-15 RX ORDER — DEXTROSE 50 % IN WATER 50 %
12.5 SYRINGE (ML) INTRAVENOUS ONCE
Refills: 0 | Status: COMPLETED | OUTPATIENT
Start: 2024-05-15 | End: 2024-05-15

## 2024-05-15 RX ORDER — VANCOMYCIN HCL 1 G
500 VIAL (EA) INTRAVENOUS ONCE
Refills: 0 | Status: DISCONTINUED | OUTPATIENT
Start: 2024-05-15 | End: 2024-05-15

## 2024-05-15 RX ORDER — VANCOMYCIN HCL 1 G
500 VIAL (EA) INTRAVENOUS ONCE
Refills: 0 | Status: COMPLETED | OUTPATIENT
Start: 2024-05-15 | End: 2024-05-15

## 2024-05-15 RX ADMIN — Medication 1 TABLET(S): at 12:30

## 2024-05-15 RX ADMIN — Medication 25 GRAM(S): at 17:34

## 2024-05-15 RX ADMIN — Medication 12.5 GRAM(S): at 05:45

## 2024-05-15 RX ADMIN — Medication 650 MILLIGRAM(S): at 10:43

## 2024-05-15 RX ADMIN — Medication 63.75 MILLIMOLE(S): at 17:37

## 2024-05-15 RX ADMIN — CARVEDILOL PHOSPHATE 6.25 MILLIGRAM(S): 80 CAPSULE, EXTENDED RELEASE ORAL at 17:35

## 2024-05-15 RX ADMIN — SODIUM CHLORIDE 4 MILLILITER(S): 9 INJECTION INTRAMUSCULAR; INTRAVENOUS; SUBCUTANEOUS at 15:08

## 2024-05-15 RX ADMIN — Medication 100 MILLIGRAM(S): at 14:31

## 2024-05-15 RX ADMIN — Medication 3 MILLILITER(S): at 02:46

## 2024-05-15 RX ADMIN — Medication 1 TABLET(S): at 12:27

## 2024-05-15 RX ADMIN — POLYETHYLENE GLYCOL 3350 17 GRAM(S): 17 POWDER, FOR SOLUTION ORAL at 12:29

## 2024-05-15 RX ADMIN — Medication 3 MILLILITER(S): at 15:08

## 2024-05-15 RX ADMIN — Medication 81 MILLIGRAM(S): at 12:28

## 2024-05-15 RX ADMIN — ERYTHROPOIETIN 10000 UNIT(S): 10000 INJECTION, SOLUTION INTRAVENOUS; SUBCUTANEOUS at 09:34

## 2024-05-15 RX ADMIN — Medication 500 MILLIGRAM(S): at 12:29

## 2024-05-15 RX ADMIN — HEPARIN SODIUM 5000 UNIT(S): 5000 INJECTION INTRAVENOUS; SUBCUTANEOUS at 05:28

## 2024-05-15 RX ADMIN — CHLORHEXIDINE GLUCONATE 1 APPLICATION(S): 213 SOLUTION TOPICAL at 12:36

## 2024-05-15 RX ADMIN — ATORVASTATIN CALCIUM 40 MILLIGRAM(S): 80 TABLET, FILM COATED ORAL at 22:06

## 2024-05-15 RX ADMIN — SENNA PLUS 2 TABLET(S): 8.6 TABLET ORAL at 22:06

## 2024-05-15 RX ADMIN — HEPARIN SODIUM 5000 UNIT(S): 5000 INJECTION INTRAVENOUS; SUBCUTANEOUS at 17:35

## 2024-05-15 RX ADMIN — SODIUM CHLORIDE 4 MILLILITER(S): 9 INJECTION INTRAMUSCULAR; INTRAVENOUS; SUBCUTANEOUS at 02:46

## 2024-05-15 RX ADMIN — CARVEDILOL PHOSPHATE 6.25 MILLIGRAM(S): 80 CAPSULE, EXTENDED RELEASE ORAL at 11:37

## 2024-05-15 RX ADMIN — LACTULOSE 10 GRAM(S): 10 SOLUTION ORAL at 12:28

## 2024-05-15 RX ADMIN — Medication 3 MILLILITER(S): at 20:38

## 2024-05-15 RX ADMIN — SODIUM CHLORIDE 4 MILLILITER(S): 9 INJECTION INTRAMUSCULAR; INTRAVENOUS; SUBCUTANEOUS at 20:38

## 2024-05-15 RX ADMIN — Medication 650 MILLIGRAM(S): at 10:00

## 2024-05-15 RX ADMIN — PANTOPRAZOLE SODIUM 40 MILLIGRAM(S): 20 TABLET, DELAYED RELEASE ORAL at 06:02

## 2024-05-15 NOTE — PROGRESS NOTE ADULT - ASSESSMENT
69-year-old male with PMH end-stage renal on HD via left aVF, CVA, BKA/AKA, functionally quadriplegic, CAD, HTN, HLD, history of O2 as needed presents for evaluation of vomiting.  Brought from HD, started vomiting while receiving treatment.  Patient unable to endorse any specific complaints, per paperwork AO x 1/0 at baseline.  Patient denies chest pain or difficulty breathing, unable to really clarify about abdominal pain. Nephrology consulted for dialysis needs.    A/P   ESRD  Center: [facility]  Nephrologist: Dr. Navarro   Access: L AVF  MWF schedule.  Consent obtained from niece, and proxy, Ramonita Vincent via phone [phone]  s/p HD 5/5 due high K+    s/p HD 5/6 .  s/p HD 5/10 UF 1339  s/p HD 5/13 UF 1L  C/W MWF schedule inpatient.  Renal diet  Monitor BMP     HTN   BP acceptable  Low salt diet.  Consider increasing carvedilol to 12.5mg BID if BP remains suboptimal post HD.  UF with HD.  Monitor BP     Anemia  At goal.  TAINA w/ HD; hold for Hgb >11.  Not iron deficient.  Monitor Hb     Hyperkalemia   s/p HD 5/5, 5/6  K high normal.  low K diet   Lokelma 10gm if K >5.3 on nonHD days  HD as scheduled.  monitor     Hyponatremia   s/p HD 5/13  Fluid restriction to 1L/day.  UF w/ HD.  monitor     Acidosis   non AG + AG  Improved w/ HD.  monitor     CKD-MBD   - at goal.  PO4 low --> replete with N[place]os 15mmol   Off binder.  Monitor Ca, PO4 daily     Nausea/Vomiting  CT A/P shows constipation and wall thickening, worrisome for stercoral colitis  s/p disimpaction.  Colorectal surgery f/u

## 2024-05-15 NOTE — PROGRESS NOTE ADULT - PROBLEM SELECTOR PROBLEM 6
Prophylactic measure
CAD (coronary artery disease)
Prophylactic measure
CAD (coronary artery disease)
Prophylactic measure
Prophylactic measure
CAD (coronary artery disease)
Prophylactic measure
CAD (coronary artery disease)
CAD (coronary artery disease)
Prophylactic measure
CAD (coronary artery disease)

## 2024-05-15 NOTE — CHART NOTE - NSCHARTNOTEFT_GEN_A_CORE
Source: Patient A&Ox [ 2]    other [x] Chart Review    Medical Course: 69 year old M with PMH ESRD on HD via left AVF MWF, CVA, BKA/AKA, functionally quadriplegic, CAD, HTN, HLD, history of O2 as needed presents for evaluation of vomiting.  Pt admitted for sepsis 2/2 colitis. Course c/b MRSA bacteremia.      Nutrition Course: Patient seen at bedside, disoriented. AxOx2 as documented in RN flow sheet. Unable to obtain nutrition information from pt. Comprehensive chart review completed. Pt noted with hypoglycemia event this morning POCT 59, s/p dextrose gel, hypoglycemia resolved. Pt's intake per RN flow sheet varies from 26-75% in hospital. Diet has been supplementing Mighty Shake1 x daily (220kcal, 6gm pro per each serving) and Orgain Shake 2x daily (460cal, 32gm pro). Pt noted currently ordered with Nephro-Hannah and ascorbic acid to promote micronutrient coverage. Pt's labs notable with Phos (2.4L) recommend liberalize diet w/o Renal diet to encourage PO intake. Pt noted with fecal incontinence, no BM documented in RN flow sheet. Bowel regimen in use. Recommend cont. monitor BMs and document in RN flow sheet. Pt's weight trend as per RN flow sheet 101.4 (5/15), 98.3 (5/13), 96.1 (5/10) pt noted with weight fluctuations likely due to weight shifts 2/2 HD. Will cont monitor weight trend as available. RD to remain available for further nutritional interventions as indicated.      Diet, Pureed:   For patients receiving Renal Replacement - No Protein Restr, No Conc K, No Conc Phos, Low Sodium (RENAL) (05-02-24 @ 14:20)      GI: WDL.     PO intake:  < 50% [x ]  50-75% [x]  Anthropometrics:   Weight (kg): 49 (04-26)    Edema: None reported at present.   Pressure Injuries: None reported at present.     __________________ Pertinent Medications__________________   MEDICATIONS  (STANDING):  albuterol/ipratropium for Nebulization 3 milliLiter(s) Nebulizer every 6 hours  ascorbic acid 500 milliGRAM(s) Oral daily  aspirin enteric coated 81 milliGRAM(s) Oral daily  atorvastatin 40 milliGRAM(s) Oral at bedtime  carvedilol 6.25 milliGRAM(s) Oral every 12 hours  chlorhexidine 2% Cloths 1 Application(s) Topical daily  dextrose 50% Injectable 25 Gram(s) IV Push once  dextrose 50% Injectable 12.5 Gram(s) IV Push once  dextrose 50% Injectable 25 Gram(s) IV Push once  dextrose Oral Gel 15 Gram(s) Oral once  epoetin dinah (EPOGEN) Injectable 67158 Unit(s) IV Push <User Schedule>  glucagon  Injectable 1 milliGRAM(s) IntraMuscular once  heparin   Injectable 5000 Unit(s) SubCutaneous every 12 hours  lactobacillus acidophilus 1 Tablet(s) Oral daily  lactulose Syrup 10 Gram(s) Oral daily  Nephro-hannah 1 Tablet(s) Oral daily  pantoprazole    Tablet 40 milliGRAM(s) Oral before breakfast  polyethylene glycol 3350 17 Gram(s) Oral daily  senna 2 Tablet(s) Oral at bedtime  sodium chloride 3%  Inhalation 4 milliLiter(s) Inhalation every 6 hours    MEDICATIONS  (PRN):  acetaminophen     Tablet .. 650 milliGRAM(s) Oral every 6 hours PRN Temp greater or equal to 38C (100.4F), Mild Pain (1 - 3)  aluminum hydroxide/magnesium hydroxide/simethicone Suspension 30 milliLiter(s) Oral every 4 hours PRN Dyspepsia  melatonin 3 milliGRAM(s) Oral at bedtime PRN Insomnia  ondansetron Injectable 4 milliGRAM(s) IV Push every 8 hours PRN Nausea and/or Vomiting  oxyCODONE    IR 5 milliGRAM(s) Oral every 6 hours PRN moderate to severe pain  sodium chloride 0.9% Bolus. 100 milliLiter(s) IV Bolus every 5 minutes PRN SBP LESS THAN or EQUAL to 80 mmHg      __________________ Pertinent Labs__________________   05-15 Na131 mmol/L<L> Glu 148 mg/dL<H> K+ 4.5 mmol/L Cr  5.12 mg/dL<H> BUN 38 mg/dL<H> 05-15 Phos 2.4 mg/dL<L> 05-09 Alb 3.0 g/dL<L>        POCT Blood Glucose.: 83 mg/dL (05-15-24 @ 11:17)  POCT Blood Glucose.: 81 mg/dL (05-15-24 @ 09:36)  POCT Blood Glucose.: 204 mg/dL (05-15-24 @ 06:05)  POCT Blood Glucose.: 59 mg/dL (05-15-24 @ 05:34)  POCT Blood Glucose.: 59 mg/dL (05-15-24 @ 05:31)  POCT Blood Glucose.: 117 mg/dL (05-15-24 @ 01:03)  POCT Blood Glucose.: 146 mg/dL (05-14-24 @ 21:04)  POCT Blood Glucose.: 132 mg/dL (05-14-24 @ 17:19)  POCT Blood Glucose.: 67 mg/dL (05-14-24 @ 16:36)  POCT Blood Glucose.: 69 mg/dL (05-14-24 @ 16:34)  POCT Blood Glucose.: 121 mg/dL (05-14-24 @ 11:16)  POCT Blood Glucose.: 80 mg/dL (05-14-24 @ 08:58)  POCT Blood Glucose.: 81 mg/dL (05-13-24 @ 21:03)  POCT Blood Glucose.: 127 mg/dL (05-13-24 @ 16:40)  POCT Blood Glucose.: 142 mg/dL (05-13-24 @ 11:24)  POCT Blood Glucose.: 105 mg/dL (05-13-24 @ 09:23)  POCT Blood Glucose.: 84 mg/dL (05-13-24 @ 07:47)  POCT Blood Glucose.: 90 mg/dL (05-13-24 @ 05:06)  POCT Blood Glucose.: 100 mg/dL (05-13-24 @ 00:48)  POCT Blood Glucose.: 101 mg/dL (05-12-24 @ 20:48)  POCT Blood Glucose.: 108 mg/dL (05-12-24 @ 16:25)          Estimated Needs:   [x] no change since previous assessment        Previous Nutrition Diagnosis: Moderate protein calorie malnutrition     Nutrition Diagnosis is [x ] ongoing       Recommendations:  1) Recommend liberalize diet w/o Renal diet to encourage PO intake.   2) Will cont. provide Mighty Shake 1x daily (220kcal, 6gm pro per each serving) and Orgain Shake 2x daily (460cal, 32gm pro) to provide optimal nutrition.   3) Monitor PO intake, Labs, weights, BMs, and skin integrity.   4) If pt continues with suboptimal nutrition intake consider appetite stimulant. RD to remain available for further nutritional interventions as indicated.       Monitoring and Evaluation:      [ x] Tolerance to diet prescription [x ] weights [x ] follow up per protocol  [ ] other: Source: Patient A&Ox [ 2]    other [x] Chart Review    Medical Course: 69 year old M with PMH ESRD on HD via left AVF MWF, CVA, BKA/AKA, functionally quadriplegic, CAD, HTN, HLD, history of O2 as needed presents for evaluation of vomiting.  Pt admitted for sepsis 2/2 colitis. Course c/b MRSA bacteremia.      Nutrition Course: Patient seen at bedside, disoriented. AxOx2 as documented in RN flow sheet. Unable to obtain nutrition information from pt. Comprehensive chart review completed. Pt's diet noted downgraded from regular to pureed to encourage PO intake. Pt noted with hypoglycemia event this morning POCT 59, s/p dextrose gel, hypoglycemia resolved. Pt's intake per RN flow sheet varies from 26-75% in hospital. Diet has been supplementing Mighty Shake1 x daily (220kcal, 6gm pro per each serving) and Orgain Shake 2x daily (460cal, 32gm pro). Pt noted currently ordered with Nephro-Hannah and ascorbic acid to promote micronutrient coverage. Pt's labs notable with Phos (2.4L) recommend liberalize diet w/o Renal diet to encourage PO intake. Pt noted with fecal incontinence, no BM documented in RN flow sheet. Bowel regimen in use. Recommend cont. monitor BMs and document in RN flow sheet. Pt's weight trend as per RN flow sheet 101.4 (5/15), 98.3 (5/13), 96.1 (5/10) pt noted with weight fluctuations likely due to weight shifts 2/2 HD. Will cont monitor weight trend as available. RD to remain available for further nutritional interventions as indicated.      Diet, Pureed:   For patients receiving Renal Replacement - No Protein Restr, No Conc K, No Conc Phos, Low Sodium (RENAL) (05-02-24 @ 14:20)      GI: WDL.     PO intake:  < 50% [x ]  50-75% [x]  Anthropometrics:   Weight (kg): 49 (04-26)    Edema: None reported at present.   Pressure Injuries: None reported at present.     __________________ Pertinent Medications__________________   MEDICATIONS  (STANDING):  albuterol/ipratropium for Nebulization 3 milliLiter(s) Nebulizer every 6 hours  ascorbic acid 500 milliGRAM(s) Oral daily  aspirin enteric coated 81 milliGRAM(s) Oral daily  atorvastatin 40 milliGRAM(s) Oral at bedtime  carvedilol 6.25 milliGRAM(s) Oral every 12 hours  chlorhexidine 2% Cloths 1 Application(s) Topical daily  dextrose 50% Injectable 25 Gram(s) IV Push once  dextrose 50% Injectable 12.5 Gram(s) IV Push once  dextrose 50% Injectable 25 Gram(s) IV Push once  dextrose Oral Gel 15 Gram(s) Oral once  epoetin dinah (EPOGEN) Injectable 20250 Unit(s) IV Push <User Schedule>  glucagon  Injectable 1 milliGRAM(s) IntraMuscular once  heparin   Injectable 5000 Unit(s) SubCutaneous every 12 hours  lactobacillus acidophilus 1 Tablet(s) Oral daily  lactulose Syrup 10 Gram(s) Oral daily  Nephro-hannah 1 Tablet(s) Oral daily  pantoprazole    Tablet 40 milliGRAM(s) Oral before breakfast  polyethylene glycol 3350 17 Gram(s) Oral daily  senna 2 Tablet(s) Oral at bedtime  sodium chloride 3%  Inhalation 4 milliLiter(s) Inhalation every 6 hours    MEDICATIONS  (PRN):  acetaminophen     Tablet .. 650 milliGRAM(s) Oral every 6 hours PRN Temp greater or equal to 38C (100.4F), Mild Pain (1 - 3)  aluminum hydroxide/magnesium hydroxide/simethicone Suspension 30 milliLiter(s) Oral every 4 hours PRN Dyspepsia  melatonin 3 milliGRAM(s) Oral at bedtime PRN Insomnia  ondansetron Injectable 4 milliGRAM(s) IV Push every 8 hours PRN Nausea and/or Vomiting  oxyCODONE    IR 5 milliGRAM(s) Oral every 6 hours PRN moderate to severe pain  sodium chloride 0.9% Bolus. 100 milliLiter(s) IV Bolus every 5 minutes PRN SBP LESS THAN or EQUAL to 80 mmHg      __________________ Pertinent Labs__________________   05-15 Na131 mmol/L<L> Glu 148 mg/dL<H> K+ 4.5 mmol/L Cr  5.12 mg/dL<H> BUN 38 mg/dL<H> 05-15 Phos 2.4 mg/dL<L> 05-09 Alb 3.0 g/dL<L>        POCT Blood Glucose.: 83 mg/dL (05-15-24 @ 11:17)  POCT Blood Glucose.: 81 mg/dL (05-15-24 @ 09:36)  POCT Blood Glucose.: 204 mg/dL (05-15-24 @ 06:05)  POCT Blood Glucose.: 59 mg/dL (05-15-24 @ 05:34)  POCT Blood Glucose.: 59 mg/dL (05-15-24 @ 05:31)  POCT Blood Glucose.: 117 mg/dL (05-15-24 @ 01:03)  POCT Blood Glucose.: 146 mg/dL (05-14-24 @ 21:04)  POCT Blood Glucose.: 132 mg/dL (05-14-24 @ 17:19)  POCT Blood Glucose.: 67 mg/dL (05-14-24 @ 16:36)  POCT Blood Glucose.: 69 mg/dL (05-14-24 @ 16:34)  POCT Blood Glucose.: 121 mg/dL (05-14-24 @ 11:16)  POCT Blood Glucose.: 80 mg/dL (05-14-24 @ 08:58)  POCT Blood Glucose.: 81 mg/dL (05-13-24 @ 21:03)  POCT Blood Glucose.: 127 mg/dL (05-13-24 @ 16:40)  POCT Blood Glucose.: 142 mg/dL (05-13-24 @ 11:24)  POCT Blood Glucose.: 105 mg/dL (05-13-24 @ 09:23)  POCT Blood Glucose.: 84 mg/dL (05-13-24 @ 07:47)  POCT Blood Glucose.: 90 mg/dL (05-13-24 @ 05:06)  POCT Blood Glucose.: 100 mg/dL (05-13-24 @ 00:48)  POCT Blood Glucose.: 101 mg/dL (05-12-24 @ 20:48)  POCT Blood Glucose.: 108 mg/dL (05-12-24 @ 16:25)          Estimated Needs:   [x] no change since previous assessment        Previous Nutrition Diagnosis: Moderate protein calorie malnutrition     Nutrition Diagnosis is [x ] ongoing       Recommendations:  1) Recommend liberalize diet w/o Renal diet to encourage PO intake.   2) Will cont. provide Mighty Shake 1x daily (220kcal, 6gm pro per each serving) and Orgain Shake 2x daily (460cal, 32gm pro) to provide optimal nutrition.   3) Monitor PO intake, Labs, weights, BMs, and skin integrity.   4) If pt continues with suboptimal nutrition intake consider appetite stimulant. RD to remain available for further nutritional interventions as indicated.       Monitoring and Evaluation:      [ x] Tolerance to diet prescription [x ] weights [x ] follow up per protocol  [ ] other:

## 2024-05-15 NOTE — PROGRESS NOTE ADULT - PROBLEM SELECTOR PLAN 2
Due to severe constipation  -CT Abdomen: Constipation with a large stool ball impacted within the upper rectum  which shows marked thickening of its wall with associated mesorectal inflammatory stranding. These findings are worrisome for developing   stercoral colitis in the setting of pressure induced ischemia. Surgical evaluation is advised. Numerous lower thoracic findings may be further assessed and characterized with dedicated CT chest.  -s/p disimpaction, now having BMs  -c/w bowel regimen- Miralax, senna, lactulose  -colorectal surgery recs appreciated
Patient hypoglycemic this morning, also yesterday but not as low. Had a prior episode of hypoglycemia on 5/2. D/W nurse at bedside, patient with poor po intake yesterday, today eating better.    Requested add on for todays hypoglycemic labs for C-peptide, b-hydroxybutyrate, TSH, cortisol, plasma insulin and proinsulin. If not possible to be added on, will check TSH and AM cortisol tomorrow.
Due to severe constipation  -CT Abdomen: Constipation with a large stool ball impacted within the upper rectum  which shows marked thickening of its wall with associated mesorectal inflammatory stranding. These findings are worrisome for developing   stercoral colitis in the setting of pressure induced ischemia. Surgical evaluation is advised. Numerous lower thoracic findings may be further assessed and characterized with dedicated CT chest.  -s/p disimpaction, now having BMs  -c/w bowel regimen- Miralax, senna, lactulose  -colorectal surgery recs appreciated
Possibly impaired gluconeogenesis  If any episodes of hypoglycemia noted, once again, will try to draw labs for C-peptide, b-hydroxybutyrate, cortisol,  insulin and proinsulin prior to correcting. -Cortisol level ok, Endocrine consulted, recommending to encourage PO intake to avoid further hypoglycemic episodes.   LFTs normal  TSH normal
Due to severe constipation  -CT Abdomen: Constipation with a large stool ball impacted within the upper rectum  which shows marked thickening of its wall with associated mesorectal inflammatory stranding. These findings are worrisome for developing   stercoral colitis in the setting of pressure induced ischemia. Surgical evaluation is advised. Numerous lower thoracic findings may be further assessed and characterized with dedicated CT chest.  -s/p disimpaction, now having BMs  -c/w bowel regimen- Miralax, senna, lactulose  -colorectal surgery recs appreciated
Possibly impaired gluconeogenesis  -Cortisol level ok, Endocrine consulted, recommending to encourage PO intake to avoid further hypoglycemic episodes.   LFTs normal  TSH normal
Possibly impaired gluconeogenesis  -Cortisol level ok, Endocrine consulted, recommending to encourage PO intake to avoid further hypoglycemic episodes.   LFTs normal  TSH normal
Possibly impaired gluconeogenesis  If any episodes of hypoglycemia noted, once again, will try to draw labs for C-peptide, b-hydroxybutyrate, cortisol,  insulin and proinsulin prior to correcting FS. Discussed on IDRs. Endocrine consulted.  LFTs normal  TSH normal
If any episodes of hypoglycemia noted, once again, will try to draw labs for C-peptide, b-hydroxybutyrate, cortisol, plasma insulin and proinsulin prior to correcting FS.  LFTs added on todays labs, will consult endocrine after  TSH normal
Possibly impaired gluconeogenesis  -Cortisol level ok, Endocrine consulted, recommending to encourage PO intake to avoid further hypoglycemic episodes.   LFTs normal  TSH normal
Due to severe constipation  -CT Abdomen: Constipation with a large stool ball impacted within the upper rectum  which shows marked thickening of its wall with associated mesorectal inflammatory stranding. These findings are worrisome for developing   stercoral colitis in the setting of pressure induced ischemia. Surgical evaluation is advised. Numerous lower thoracic findings may be further assessed and characterized with dedicated CT chest.  -s/p disimpaction, now having BMs  -c/w bowel regimen- Miralax, senna, lactulose  -colorectal surgery recs appreciated
Possibly impaired gluconeogenesis  -Cortisol level ok, Endocrine consulted, recommending to encourage PO intake to avoid further hypoglycemic episodes.   LFTs normal  TSH normal
If any episodes of hypoglycemia noted, will draw labs for C-peptide, b-hydroxybutyrate, cortisol, plasma insulin and proinsulin prior to correcting FS.  TSH normal

## 2024-05-15 NOTE — PROGRESS NOTE ADULT - PROBLEM SELECTOR PLAN 7
Need to verify Percocet - noted on HD paperwork but not seen on [place]    Added oxy 5 mg q6h prn for mod to severe pain  DVT ppx: heparin bid   Diet: Passed dysphagia screen dysphagia screen, Renal DASH TLC easy to chew  Prior hospitalist discussed with hayden Vincent [phone] on 5/2. All questions answered.
DVT ppx: heparin bid   Diet: Passed dysphagia screen dysphagia screen, Renal DASH TLC easy to chew  niece Ramonita Vincent [phone]    Pending LTC
Added oxy 5 mg q6h prn for mod to severe pain  DVT ppx: heparin bid   Diet: Passed dysphagia screen dysphagia screen, Renal DASH TLC easy to chew  niece Ramonita Vincent [phone]
Added oxy 5 mg q6h prn for mod to severe pain  DVT ppx: heparin bid   Diet: Passed dysphagia screen dysphagia screen, Renal DASH TLC easy to chew  Prior hospitalist discussed with hayden Vincent [phone] on 5/2. All questions answered.
Added oxy 5 mg q6h prn for mod to severe pain  DVT ppx: heparin bid   Diet: Passed dysphagia screen dysphagia screen, Renal DASH TLC easy to chew  Prior hospitalist discussed with hayden Vincent [phone] on 5/2. All questions answered.
Added oxy 5 mg q6h prn for mod to severe pain  DVT ppx: heparin bid   Diet: Passed dysphagia screen dysphagia screen, Renal DASH TLC easy to chew  Prior hospitalist discussed with hayden Vincent [phone] on 5/2. All questions answered.
Added oxy 5 mg q6h prn for mod to severe pain  DVT ppx: heparin bid   Diet: Passed dysphagia screen dysphagia screen, Renal DASH TLC easy to chew  niece Ramonita Vincent [phone]
Added oxy 5 mg q6h prn for mod to severe pain  DVT ppx: heparin bid   Diet: Passed dysphagia screen dysphagia screen, Renal DASH TLC easy to chew  Prior hospitalist discussed with hayden Vincent [phone] on 5/2. All questions answered.
Added oxy 5 mg q6h prn for mod to severe pain  DVT ppx: heparin bid   Diet: Passed dysphagia screen dysphagia screen, Renal DASH TLC easy to chew  niece Ramonita Vincent [phone]

## 2024-05-15 NOTE — PROGRESS NOTE ADULT - PROBLEM SELECTOR PROBLEM 5
CAD (coronary artery disease)
CAD (coronary artery disease)
CVA (cerebrovascular accident)
CAD (coronary artery disease)
CVA (cerebrovascular accident)
CAD (coronary artery disease)
CVA (cerebrovascular accident)
CAD (coronary artery disease)
CVA (cerebrovascular accident)
CVA (cerebrovascular accident)

## 2024-05-15 NOTE — PROGRESS NOTE ADULT - PROBLEM SELECTOR PROBLEM 1
Sepsis

## 2024-05-15 NOTE — CHART NOTE - NSCHARTNOTESELECT_GEN_ALL_CORE
Event Note
Event Note
Follow Up/Nutrition Services
Endocrinology/Event Note
Event Note
Follow Up/Nutrition Services
Nutrition Services

## 2024-05-15 NOTE — PROGRESS NOTE ADULT - SUBJECTIVE AND OBJECTIVE BOX
[facility]   MD MARIBELL CARRION MD ANGELA WONG, PA Venitha Krishnan, NP    TEL:  OFFICE: [phone]  From 5pm-7am Answering Service [phone]    -- RENAL FOLLOW UP NOTE ---Date of Service 05-15-24 @ 15:44    Patient is a 69y old  Male who presents with a chief complaint of nausea and vomiting (14 May 2024 17:24)      Patient seen and examined at bedside. No chest pain/sob    VITALS:  T(F): 97.8 (05-15-24 @ 10:00), Max: 98.3 (05-15-24 @ 05:36)  HR: 82 (05-15-24 @ 15:11)  BP: 133/56 (05-15-24 @ 10:00)  RR: 17 (05-15-24 @ 10:00)  SpO2: 98% (05-15-24 @ 15:11)  Wt(kg): --    05-14 @ 07:01  -  05-15 @ 07:00  --------------------------------------------------------  IN: 600 mL / OUT: 0 mL / NET: 600 mL    05-15 @ 07:01  -  05-15 @ 15:44  --------------------------------------------------------  IN: 600 mL / OUT: 1900 mL / NET: -1300 mL          PHYSICAL EXAM:  General: NAD  Neck: No JVD  Respiratory: CTAB, no wheezes, rales or rhonchi  Cardiovascular: S1, S2, RRR  Gastrointestinal: BS+, soft, NT/ND  Extremities: R AKA, L BKA    Hospital Medications:   MEDICATIONS  (STANDING):  albuterol/ipratropium for Nebulization 3 milliLiter(s) Nebulizer every 6 hours  ascorbic acid 500 milliGRAM(s) Oral daily  aspirin enteric coated 81 milliGRAM(s) Oral daily  atorvastatin 40 milliGRAM(s) Oral at bedtime  carvedilol 6.25 milliGRAM(s) Oral every 12 hours  chlorhexidine 2% Cloths 1 Application(s) Topical daily  dextrose 50% Injectable 25 Gram(s) IV Push once  dextrose 50% Injectable 25 Gram(s) IV Push once  dextrose 50% Injectable 12.5 Gram(s) IV Push once  dextrose Oral Gel 15 Gram(s) Oral once  epoetin dinah (EPOGEN) Injectable 03510 Unit(s) IV Push <User Schedule>  glucagon  Injectable 1 milliGRAM(s) IntraMuscular once  heparin   Injectable 5000 Unit(s) SubCutaneous every 12 hours  lactobacillus acidophilus 1 Tablet(s) Oral daily  lactulose Syrup 10 Gram(s) Oral daily  Nephro-hannah 1 Tablet(s) Oral daily  pantoprazole    Tablet 40 milliGRAM(s) Oral before breakfast  polyethylene glycol 3350 17 Gram(s) Oral daily  senna 2 Tablet(s) Oral at bedtime  sodium chloride 3%  Inhalation 4 milliLiter(s) Inhalation every 6 hours      LABS:  05-15    131<L>  |  91<L>  |  38<H>  ----------------------------<  148<H>  4.5   |  29  |  5.12<H>    Ca    8.8      15 May 2024 06:35  Phos  2.4     05-15  Mg     2.60     05-15      Creatinine Trend: 5.12 <--, 3.94 <--, 5.59 <--, 4.53 <--, 3.58 <--, 4.22 <--, 3.14 <--    Phosphorus: 2.4 mg/dL (05-15 @ 06:35)                              9.1    5.27  )-----------( 205      ( 15 May 2024 06:35 )             29.4     Urine Studies:  Urinalysis - [05-15-24 @ 06:35]      Color  / Appearance  / SG  / pH       Gluc 148 / Ketone   / Bili  / Urobili        Blood  / Protein  / Leuk Est  / Nitrite       RBC  / WBC  / Hyaline  / Gran  / Sq Epi  / Non Sq Epi  / Bacteria       Iron 42, TIBC 127, %sat 33      [05-11-24 @ 07:26]  Ferritin 1680      [05-11-24 @ 07:26]  PTH -- (Ca --)      [04-28-24 @ 05:00]   155  HbA1c 4.2      [07-19-19 @ 09:56]  TSH 1.96      [05-07-24 @ 04:54]  Lipid: chol 119, TG 66, HDL 46, LDL --      [10-07-23 @ 09:30]    HBsAb 204.3      [05-03-24 @ 11:25]  HBsAg Nonreact      [05-04-24 @ 16:30]  HBcAb Nonreact      [05-03-24 @ 11:25]  HCV 0.12, Nonreact      [05-04-24 @ 16:30]      RADIOLOGY & ADDITIONAL STUDIES:

## 2024-05-15 NOTE — PROGRESS NOTE ADULT - SUBJECTIVE AND OBJECTIVE BOX
Ana Cristina Thompson        Patient is a 69y old  Male who presents with a chief complaint of nausea and vomiting (15 May 2024 15:44)      SUBJECTIVE / OVERNIGHT EVENTS: No acute overnight events. Denies fevers, chills, nausea, vomiting, chest pain, SOB.    MEDICATIONS  (STANDING):  albuterol/ipratropium for Nebulization 3 milliLiter(s) Nebulizer every 6 hours  ascorbic acid 500 milliGRAM(s) Oral daily  aspirin enteric coated 81 milliGRAM(s) Oral daily  atorvastatin 40 milliGRAM(s) Oral at bedtime  carvedilol 6.25 milliGRAM(s) Oral every 12 hours  chlorhexidine 2% Cloths 1 Application(s) Topical daily  dextrose 50% Injectable 25 Gram(s) IV Push once  dextrose 50% Injectable 25 Gram(s) IV Push once  dextrose 50% Injectable 12.5 Gram(s) IV Push once  dextrose 50% Injectable 25 Gram(s) IV Push once  dextrose Oral Gel 15 Gram(s) Oral once  epoetin dinah (EPOGEN) Injectable 22271 Unit(s) IV Push <User Schedule>  glucagon  Injectable 1 milliGRAM(s) IntraMuscular once  heparin   Injectable 5000 Unit(s) SubCutaneous every 12 hours  lactobacillus acidophilus 1 Tablet(s) Oral daily  lactulose Syrup 10 Gram(s) Oral daily  Nephro-hannah 1 Tablet(s) Oral daily  pantoprazole    Tablet 40 milliGRAM(s) Oral before breakfast  polyethylene glycol 3350 17 Gram(s) Oral daily  senna 2 Tablet(s) Oral at bedtime  sodium chloride 3%  Inhalation 4 milliLiter(s) Inhalation every 6 hours  sodium phosphate 15 milliMole(s)/250 mL IVPB 15 milliMole(s) IV Intermittent once    MEDICATIONS  (PRN):  acetaminophen     Tablet .. 650 milliGRAM(s) Oral every 6 hours PRN Temp greater or equal to 38C (100.4F), Mild Pain (1 - 3)  aluminum hydroxide/magnesium hydroxide/simethicone Suspension 30 milliLiter(s) Oral every 4 hours PRN Dyspepsia  melatonin 3 milliGRAM(s) Oral at bedtime PRN Insomnia  ondansetron Injectable 4 milliGRAM(s) IV Push every 8 hours PRN Nausea and/or Vomiting  oxyCODONE    IR 5 milliGRAM(s) Oral every 6 hours PRN moderate to severe pain  sodium chloride 0.9% Bolus. 100 milliLiter(s) IV Bolus every 5 minutes PRN SBP LESS THAN or EQUAL to 80 mmHg    Allergies    No Known Allergies    Intolerances        Vital Signs Last 24 Hrs  T(C): 36.5 (15 May 2024 17:01), Max: 36.8 (15 May 2024 05:36)  T(F): 97.7 (15 May 2024 17:01), Max: 98.3 (15 May 2024 05:36)  HR: 67 (15 May 2024 17:01) (67 - 98)  BP: 111/55 (15 May 2024 17:01) (111/55 - 133/56)  BP(mean): --  RR: 18 (15 May 2024 17:01) (17 - 18)  SpO2: 98% (15 May 2024 15:11) (98% - 100%)    Parameters below as of 15 May 2024 17:01  Patient On (Oxygen Delivery Method): nasal cannula  O2 Flow (L/min): 2    Daily     Daily Weight in k.7 (15 May 2024 10:00)  CAPILLARY BLOOD GLUCOSE      POCT Blood Glucose.: 81 mg/dL (15 May 2024 16:18)  POCT Blood Glucose.: 83 mg/dL (15 May 2024 11:17)  POCT Blood Glucose.: 81 mg/dL (15 May 2024 09:36)  POCT Blood Glucose.: 204 mg/dL (15 May 2024 06:05)  POCT Blood Glucose.: 59 mg/dL (15 May 2024 05:34)  POCT Blood Glucose.: 59 mg/dL (15 May 2024 05:31)  POCT Blood Glucose.: 117 mg/dL (15 May 2024 01:03)  POCT Blood Glucose.: 146 mg/dL (14 May 2024 21:04)    I&O's Summary    14 May 2024 07:01  -  15 May 2024 07:00  --------------------------------------------------------  IN: 600 mL / OUT: 0 mL / NET: 600 mL    15 May 2024 07:01  -  15 May 2024 17:26  --------------------------------------------------------  IN: 600 mL / OUT: 1900 mL / NET: -1300 mL        PHYSICAL EXAM:  GENERAL: NAD  CHEST/LUNG: CTAB, breathing non-labored  COR: RR, no mrcg  ABD: Soft, ND/NT, +BS  PSYCH: AAOx 1  SKIN: No rashes or lesions  EXT: wwp, s/p b/l AKA and BKA    LABS:                        9.1    5.27  )-----------( 205      ( 15 May 2024 06:35 )             29.4     Hgb Trend: 9.1<--, 10.4<--, 11.3<--, 10.6<--, 10.9<--  05-15    131<L>  |  91<L>  |  38<H>  ----------------------------<  148<H>  4.5   |  29  |  5.12<H>    Ca    8.8      15 May 2024 06:35  Phos  2.4     05-15  Mg     2.60     05-15      Creatinine Trend: 5.12<--, 3.94<--, 5.59<--, 4.53<--, 3.58<--, 4.22<--          Urinalysis Basic - ( 15 May 2024 06:35 )    Color: x / Appearance: x / SG: x / pH: x  Gluc: 148 mg/dL / Ketone: x  / Bili: x / Urobili: x   Blood: x / Protein: x / Nitrite: x   Leuk Esterase: x / RBC: x / WBC x   Sq Epi: x / Non Sq Epi: x / Bacteria: x        RADIOLOGY & ADDITIONAL TESTS:    Imaging Personally Reviewed.    Consultant(s) Notes Reviewed.    Care Discussed with Consultants/Other Providers.

## 2024-05-15 NOTE — PROGRESS NOTE ADULT - PROBLEM SELECTOR PROBLEM 2
Hypoglycemia
Stercoral colitis
Stercoral colitis
Hypoglycemia
Stercoral colitis
Hypoglycemia
Stercoral colitis
Hypoglycemia

## 2024-05-15 NOTE — PROGRESS NOTE ADULT - PROBLEM SELECTOR PROBLEM 4
ESRD on hemodialysis
CVA (cerebrovascular accident)
ESRD on hemodialysis
CVA (cerebrovascular accident)
CVA (cerebrovascular accident)
ESRD on hemodialysis
CVA (cerebrovascular accident)
ESRD on hemodialysis

## 2024-05-15 NOTE — PROGRESS NOTE ADULT - NUTRITIONAL ASSESSMENT
This patient has been assessed with a concern for Malnutrition and has been determined to have a diagnosis/diagnoses of Moderate protein-calorie malnutrition.    This patient is being managed with:   Diet Pureed-  For patients receiving Renal Replacement - No Protein Restr No Conc K No Conc Phos Low Sodium (RENAL)  Entered: May  2 2024  2:20PM  

## 2024-05-15 NOTE — PROGRESS NOTE ADULT - PROBLEM SELECTOR PLAN 3
HD MWF   WANDER SCALES  Nephrology following   Midodrine 5mg with HD  C/w Calcium acetate tid
HD MWF   WANDER SCALES  Nephrology following   Midodrine 5mg with HD  Calcium acetate tid
Due to severe constipation  -CT Abdomen: Constipation with a large stool ball impacted within the upper rectum  which shows marked thickening of its wall with associated mesorectal inflammatory stranding. These findings are worrisome for developing   stercoral colitis in the setting of pressure induced ischemia. Surgical evaluation is advised. Numerous lower thoracic findings may be further assessed and characterized with dedicated CT chest.  -s/p disimpaction, now having BMs  -c/w bowel regimen- Miralax, senna, lactulose  -colorectal surgery recs appreciated
Due to severe constipation  -CT Abdomen: Constipation with a large stool ball impacted within the upper rectum  which shows marked thickening of its wall with associated mesorectal inflammatory stranding. These findings are worrisome for developing   stercoral colitis in the setting of pressure induced ischemia. Surgical evaluation is advised. Numerous lower thoracic findings may be further assessed and characterized with dedicated CT chest.  -s/p disimpaction, now having BMs  -c/w bowel regimen- Miralax, senna, lactulose  -colorectal surgery recs appreciated
HD MWF   WANDER SCALES  Nephrology following   Midodrine 5mg with HD  C/w Calcium acetate tid
Due to severe constipation  -CT Abdomen: Constipation with a large stool ball impacted within the upper rectum  which shows marked thickening of its wall with associated mesorectal inflammatory stranding. These findings are worrisome for developing   stercoral colitis in the setting of pressure induced ischemia. Surgical evaluation is advised. Numerous lower thoracic findings may be further assessed and characterized with dedicated CT chest.  -s/p disimpaction, now having BMs  -c/w bowel regimen- Miralax, senna, lactulose  -colorectal surgery recs appreciated
HD MWF   WANDER SCALES  Nephrology following   Midodrine 5mg with HD  C/w Calcium acetate tid
Due to severe constipation  -CT Abdomen: Constipation with a large stool ball impacted within the upper rectum  which shows marked thickening of its wall with associated mesorectal inflammatory stranding. These findings are worrisome for developing   stercoral colitis in the setting of pressure induced ischemia. Surgical evaluation is advised. Numerous lower thoracic findings may be further assessed and characterized with dedicated CT chest.  -s/p disimpaction, now having BMs  -c/w bowel regimen- Miralax, senna, lactulose  -colorectal surgery recs appreciated
HD MWF   WANDER SCALES  Nephrology following   Midodrine 5mg with HD  C/w Calcium acetate tid
BMP with hyperkalemia (hemolyzed), hyponatremia - discussed w/nephrology on call; will get additional HD today  HD MWF   WANDER SCALES  Nephrology following   Midodrine 5mg with HD  C/w Calcium acetate tid
Due to severe constipation  -CT Abdomen: Constipation with a large stool ball impacted within the upper rectum  which shows marked thickening of its wall with associated mesorectal inflammatory stranding. These findings are worrisome for developing   stercoral colitis in the setting of pressure induced ischemia. Surgical evaluation is advised. Numerous lower thoracic findings may be further assessed and characterized with dedicated CT chest.  -s/p disimpaction, now having BMs  -c/w bowel regimen- Miralax, senna, lactulose  -colorectal surgery recs appreciated
BMP with hyperkalemia (hemolyzed), hyponatremia - discussed w/nephrology on call; will get additional HD today  HD MWF   WANDER SCALES  Nephrology following   Midodrine 5mg with HD  C/w Calcium acetate tid
HD MWF   WANDER SCALES  Nephrology following   Midodrine 5mg with HD  C/w Calcium acetate tid
Due to severe constipation  -CT Abdomen: Constipation with a large stool ball impacted within the upper rectum  which shows marked thickening of its wall with associated mesorectal inflammatory stranding. These findings are worrisome for developing   stercoral colitis in the setting of pressure induced ischemia. Surgical evaluation is advised. Numerous lower thoracic findings may be further assessed and characterized with dedicated CT chest.  -s/p disimpaction, now having BMs  -c/w bowel regimen- Miralax, senna, lactulose  -colorectal surgery recs appreciated
HD MWF   WANDER SCALES  Nephrology following   Midodrine 5mg with HD  Calcium acetate tid  Hyponatremia likely chronic from history review
Due to severe constipation  -CT Abdomen: Constipation with a large stool ball impacted within the upper rectum  which shows marked thickening of its wall with associated mesorectal inflammatory stranding. These findings are worrisome for developing   stercoral colitis in the setting of pressure induced ischemia. Surgical evaluation is advised. Numerous lower thoracic findings may be further assessed and characterized with dedicated CT chest.  -s/p disimpaction, now having BMs  -c/w bowel regimen- Miralax, senna, lactulose  -colorectal surgery recs appreciated

## 2024-05-15 NOTE — PROGRESS NOTE ADULT - TIME BILLING
Patient encounter, including chart review, medication review, patient interview, ordering labs and medications, interpreting labs and imaging results, and coordination of care with consultants
Patient encounter, including chart review, medication review, patient interview, ordering labs and medications, interpreting labs and imaging results, and coordination of care with consultants
reviewing laboratory data, consultants' recommendations, documentation in [place], performing medically appropriate examinations/evaluations, discussion with patient/family/RN/ACP/Residents and interdisciplinary staff (such as , social workers, etc), counseling patient/family/care giver, ordering medically appropriate medication, tests, or procedures
reviewing laboratory data, consultants' recommendations, documentation in [place], performing medically appropriate examinations/evaluations, discussion with patient/family/RN/ACP/Residents and interdisciplinary staff (such as , social workers, etc), counseling patient/family/care giver, ordering medically appropriate medication, tests, or procedures
reviewing laboratory data, consultants' recommendations, documentation in [place], performing medically appropriate examinations/evaluations, discussion with patient/family/RN/ACP/Residents and interdisciplinary staff (such as , social workers, etc), counseling patient/family/care giver, ordering medically appropriate medication, tests, or procedures

## 2024-05-15 NOTE — PROGRESS NOTE ADULT - PROBLEM SELECTOR PLAN 5
Continue ASA, statin  Niece notes pt has hx of CVAs resulting in some dysphagia and diet was initially pureed/nectar - upgraded to soft food OP  Passed dysphagia screen in hospital
- Continue with Atorvastatin 40mg qhs   - Continue with ASA 81mg qd  - Cardiology consulted for abnormal appearing myocardium on CT abdomen/pelvis; however, heart appears to be normal size on CT chest    # Elevated Troponin, likely demand and also in setting of ESRD  - Troponin 384, 355  - No need to keep trending    #Chronic systolic CHF  - ISRAEL with severely reduced EF, LV global hypokinesis  - Likely driven by CAD  - started carvedilol; appreciate cardio recs
Continue ASA, statin  Niece notes pt has hx of CVAs resulting in some dysphagia and diet was initially pureed/nectar - upgraded to soft food OP  Passed dysphagia screen in hospital
- Continue with Atorvastatin 40mg qhs   - Continue with ASA 81mg qd  - Cardiology consulted for abnormal appearing myocardium  - TTE pending    # Elevated Troponin, likely demand and also in setting of ESRD  - Troponin 384, 355  - No need to keep trending
Continue ASA, statin  Niece notes pt has hx of CVAs resulting in some dysphagia and diet was initially pureed/nectar - upgraded to soft food OP  Passed dysphagia screen in hospital
- Continue with Atorvastatin 40mg qhs   - Continue with ASA 81mg qd  - Cardiology consulted for abnormal appearing myocardium on CT abdomen/pelvis; however, heart appears to be normal size on CT chest    # Elevated Troponin, likely demand and also in setting of ESRD  - Troponin 384, 355  - No need to keep trending    #Chronic systolic CHF  - ISRAEL with severely reduced EF, LV global hypokinesis  - Likely driven by CAD  - started carvedilol  - will update cardiology team (previously following)
- Continue with Atorvastatin 40mg qhs   - Continue with ASA 81mg qd  - Cardiology consulted for abnormal appearing myocardium on CT abdomen/pelvis; however, heart appears to be normal size on CT chest    # Elevated Troponin, likely demand and also in setting of ESRD  - Troponin 384, 355  - No need to keep trending    #Chronic systolic CHF  - ISRAEL with severely reduced EF, LV global hypokinesis  - Likely driven by CAD  - started carvedilol; appreciate cardio recs
- Continue with Atorvastatin 40mg qhs   - Continue with ASA 81mg qd  - Cardiology consulted for abnormal appearing myocardium on CT abdomen/pelvis; however, heart appears to be normal size on CT chest    # Elevated Troponin, likely demand and also in setting of ESRD  - Troponin 384, 355  - No need to keep trending
Continue ASA, statin  Niece notes pt has hx of CVAs resulting in some dysphagia and diet was initially pureed/nectar - upgraded to soft food OP  Passed dysphagia screen in hospital
- Continue with Atorvastatin 40mg qhs   - Continue with ASA 81mg qd  - Cardiology consulted for abnormal appearing myocardium on CT abdomen/pelvis; however, heart appears to be normal size on CT chest    # Elevated Troponin, likely demand and also in setting of ESRD  - Troponin 384, 355  - No need to keep trending
- Continue with Atorvastatin 40mg qhs   - Continue with ASA 81mg qd  - Cardiology consulted for abnormal appearing myocardium on CT abdomen/pelvis; however, heart appears to be normal size on CT chest    # Elevated Troponin, likely demand and also in setting of ESRD  - Troponin 384, 355  - No need to keep trending
- Continue with Atorvastatin 40mg qhs   - Continue with ASA 81mg qd  - Cardiology consulted for abnormal appearing myocardium on CT abdomen/pelvis; however, heart appears to be normal size on CT chest    # Elevated Troponin, likely demand and also in setting of ESRD  - Troponin 384, 355  - No need to keep trending    #Chronic systolic CHF  - ISRAEL with severely reduced EF, LV global hypokinesis  - Likely driven by CAD  - started carvedilol  - will update cardiology team (previously following)
Continue ASA, statin  Niece notes pt has hx of CVAs resulting in some dysphagia and diet was initially pureed/nectar - upgraded to soft food OP  Passed dysphagia screen in hospital
- Continue with Atorvastatin 40mg qhs   - Continue with ASA 81mg qd  - Cardiology consulted for abnormal appearing myocardium on CT abdomen/pelvis; however, heart appears to be normal size on CT chest    # Elevated Troponin, likely demand and also in setting of ESRD  - Troponin 384, 355  - No need to keep trending
- Continue with Atorvastatin 40mg qhs   - Continue with ASA 81mg qd  - Cardiology consulted for abnormal appearing myocardium on CT abdomen/pelvis; however, heart appears to be normal size on CT chest    # Elevated Troponin, likely demand and also in setting of ESRD  - Troponin 384, 355  - No need to keep trending    #Chronic systolic CHF  - ISRAEL with severely reduced EF, LV global hypokinesis  - Likely driven by CAD  - started carvedilol  - will update cardiology team (previously following)
- Continue with Atorvastatin 40mg qhs   - Continue with ASA 81mg qd  - Cardiology consulted for abnormal appearing myocardium on CT abdomen/pelvis; however, heart appears to be normal size on CT chest    # Elevated Troponin, likely demand and also in setting of ESRD  - Troponin 384, 355  - No need to keep trending    #Chronic systolic CHF  - ISRAEL with severely reduced EF, LV global hypokinesis  - Likely driven by CAD  - started carvedilol; appreciate cardio recs

## 2024-05-15 NOTE — PROGRESS NOTE ADULT - PROBLEM SELECTOR PROBLEM 3
ESRD on hemodialysis
Stercoral colitis
ESRD on hemodialysis
Stercoral colitis
ESRD on hemodialysis
Stercoral colitis
ESRD on hemodialysis
Stercoral colitis
ESRD on hemodialysis
Stercoral colitis

## 2024-05-15 NOTE — PROGRESS NOTE ADULT - PROBLEM SELECTOR PLAN 4
Continue ASA, statin  Niece notes pt has hx of CVAs resulting in some dysphagia and diet was initially pureed/nectar - upgraded to soft food OP  Passed dysphagia screen in hospital
HD MWF   WANDER SCALES  Nephrology following   Midodrine 5mg with HD  C/w Calcium acetate tid
HD MWF   WANDER SCALES  Nephrology following   Midodrine 5mg with HD  C/w Calcium acetate tid
Continue ASA, statin  Niece notes pt has hx of CVAs resulting in some dysphagia and diet was initially pureed/nectar - upgraded to soft food OP  Passed dysphagia screen in hospital
HD MWF   WANDER SCALES  Nephrology following   Midodrine 5mg with HD  C/w Calcium acetate tid
Continue ASA, statin  Niece notes pt has hx of CVAs resulting in some dysphagia and diet was initially pureed/nectar - upgraded to soft food OP  Passed dysphagia screen in hospital
HD MWF   WANDER SCALES  Nephrology following   Midodrine 5mg with HD  C/w Calcium acetate tid
Continue ASA, statin  Niece notes pt has hx of CVAs resulting in some dysphagia and diet was initially pureed/nectar - upgraded to soft food OP  Passed dysphagia screen in hospital
HD MWF   WANDER SCALES  Nephrology following   Midodrine 5mg with HD  C/w Calcium acetate tid
Continue ASA, statin  Niece notes pt has hx of CVAs resulting in some dysphagia and diet was initially pureed/nectar - upgraded to soft food OP  Passed dysphagia screen in hospital
HD MWF   WANDER SCALES  Nephrology following   Midodrine 5mg with HD  C/w Calcium acetate tid
HD MWF   WANDER SCALES  Nephrology following   Midodrine 5mg with HD  C/w Calcium acetate tid

## 2024-05-16 VITALS
RESPIRATION RATE: 18 BRPM | SYSTOLIC BLOOD PRESSURE: 134 MMHG | HEART RATE: 90 BPM | TEMPERATURE: 98 F | DIASTOLIC BLOOD PRESSURE: 80 MMHG | OXYGEN SATURATION: 100 %

## 2024-05-16 LAB
ANION GAP SERPL CALC-SCNC: 15 MMOL/L — HIGH (ref 7–14)
BUN SERPL-MCNC: 16 MG/DL — SIGNIFICANT CHANGE UP (ref 7–23)
CALCIUM SERPL-MCNC: 9.1 MG/DL — SIGNIFICANT CHANGE UP (ref 8.4–10.5)
CHLORIDE SERPL-SCNC: 96 MMOL/L — LOW (ref 98–107)
CO2 SERPL-SCNC: 25 MMOL/L — SIGNIFICANT CHANGE UP (ref 22–31)
CREAT SERPL-MCNC: 3.28 MG/DL — HIGH (ref 0.5–1.3)
EGFR: 19 ML/MIN/1.73M2 — LOW
GLUCOSE BLDC GLUCOMTR-MCNC: 105 MG/DL — HIGH (ref 70–99)
GLUCOSE BLDC GLUCOMTR-MCNC: 203 MG/DL — HIGH (ref 70–99)
GLUCOSE BLDC GLUCOMTR-MCNC: 60 MG/DL — LOW (ref 70–99)
GLUCOSE BLDC GLUCOMTR-MCNC: 72 MG/DL — SIGNIFICANT CHANGE UP (ref 70–99)
GLUCOSE BLDC GLUCOMTR-MCNC: 87 MG/DL — SIGNIFICANT CHANGE UP (ref 70–99)
GLUCOSE SERPL-MCNC: 64 MG/DL — LOW (ref 70–99)
HCT VFR BLD CALC: 36.4 % — LOW (ref 39–50)
HGB BLD-MCNC: 11.3 G/DL — LOW (ref 13–17)
MAGNESIUM SERPL-MCNC: 2.5 MG/DL — SIGNIFICANT CHANGE UP (ref 1.6–2.6)
MCHC RBC-ENTMCNC: 26.7 PG — LOW (ref 27–34)
MCHC RBC-ENTMCNC: 31 GM/DL — LOW (ref 32–36)
MCV RBC AUTO: 86.1 FL — SIGNIFICANT CHANGE UP (ref 80–100)
NRBC # BLD: 0 /100 WBCS — SIGNIFICANT CHANGE UP (ref 0–0)
NRBC # FLD: 0 K/UL — SIGNIFICANT CHANGE UP (ref 0–0)
PHOSPHATE SERPL-MCNC: 3.1 MG/DL — SIGNIFICANT CHANGE UP (ref 2.5–4.5)
PLATELET # BLD AUTO: 140 K/UL — LOW (ref 150–400)
POTASSIUM SERPL-MCNC: 4.4 MMOL/L — SIGNIFICANT CHANGE UP (ref 3.5–5.3)
POTASSIUM SERPL-SCNC: 4.4 MMOL/L — SIGNIFICANT CHANGE UP (ref 3.5–5.3)
RBC # BLD: 4.23 M/UL — SIGNIFICANT CHANGE UP (ref 4.2–5.8)
RBC # FLD: 19.8 % — HIGH (ref 10.3–14.5)
SODIUM SERPL-SCNC: 136 MMOL/L — SIGNIFICANT CHANGE UP (ref 135–145)
WBC # BLD: 3.62 K/UL — LOW (ref 3.8–10.5)
WBC # FLD AUTO: 3.62 K/UL — LOW (ref 3.8–10.5)

## 2024-05-16 PROCEDURE — 99239 HOSP IP/OBS DSCHRG MGMT >30: CPT

## 2024-05-16 RX ORDER — PANTOPRAZOLE SODIUM 20 MG/1
1 TABLET, DELAYED RELEASE ORAL
Qty: 0 | Refills: 0 | DISCHARGE
Start: 2024-05-16

## 2024-05-16 RX ORDER — DEXTROSE 50 % IN WATER 50 %
25 SYRINGE (ML) INTRAVENOUS ONCE
Refills: 0 | Status: COMPLETED | OUTPATIENT
Start: 2024-05-16 | End: 2024-05-16

## 2024-05-16 RX ORDER — OXYCODONE HYDROCHLORIDE 5 MG/1
5 TABLET ORAL EVERY 6 HOURS
Refills: 0 | Status: DISCONTINUED | OUTPATIENT
Start: 2024-05-16 | End: 2024-05-16

## 2024-05-16 RX ORDER — ASCORBIC ACID 60 MG
1 TABLET,CHEWABLE ORAL
Qty: 0 | Refills: 0 | DISCHARGE
Start: 2024-05-16

## 2024-05-16 RX ORDER — CARVEDILOL PHOSPHATE 80 MG/1
1 CAPSULE, EXTENDED RELEASE ORAL
Qty: 0 | Refills: 0 | DISCHARGE
Start: 2024-05-16

## 2024-05-16 RX ORDER — VANCOMYCIN HCL 1 G
500 VIAL (EA) INTRAVENOUS
Qty: 5 | Refills: 0
Start: 2024-05-16 | End: 2024-05-20

## 2024-05-16 RX ORDER — POLYETHYLENE GLYCOL 3350 17 G/17G
17 POWDER, FOR SOLUTION ORAL
Qty: 0 | Refills: 0 | DISCHARGE
Start: 2024-05-16

## 2024-05-16 RX ADMIN — Medication 3 MILLILITER(S): at 16:07

## 2024-05-16 RX ADMIN — CHLORHEXIDINE GLUCONATE 1 APPLICATION(S): 213 SOLUTION TOPICAL at 09:53

## 2024-05-16 RX ADMIN — SODIUM CHLORIDE 4 MILLILITER(S): 9 INJECTION INTRAMUSCULAR; INTRAVENOUS; SUBCUTANEOUS at 16:08

## 2024-05-16 RX ADMIN — Medication 1 TABLET(S): at 09:53

## 2024-05-16 RX ADMIN — PANTOPRAZOLE SODIUM 40 MILLIGRAM(S): 20 TABLET, DELAYED RELEASE ORAL at 05:30

## 2024-05-16 RX ADMIN — HEPARIN SODIUM 5000 UNIT(S): 5000 INJECTION INTRAVENOUS; SUBCUTANEOUS at 17:13

## 2024-05-16 RX ADMIN — Medication 3 MILLILITER(S): at 11:52

## 2024-05-16 RX ADMIN — Medication 1 TABLET(S): at 09:52

## 2024-05-16 RX ADMIN — CARVEDILOL PHOSPHATE 6.25 MILLIGRAM(S): 80 CAPSULE, EXTENDED RELEASE ORAL at 05:30

## 2024-05-16 RX ADMIN — LACTULOSE 10 GRAM(S): 10 SOLUTION ORAL at 09:51

## 2024-05-16 RX ADMIN — Medication 81 MILLIGRAM(S): at 09:53

## 2024-05-16 RX ADMIN — HEPARIN SODIUM 5000 UNIT(S): 5000 INJECTION INTRAVENOUS; SUBCUTANEOUS at 05:30

## 2024-05-16 RX ADMIN — Medication 25 GRAM(S): at 11:44

## 2024-05-16 RX ADMIN — POLYETHYLENE GLYCOL 3350 17 GRAM(S): 17 POWDER, FOR SOLUTION ORAL at 09:52

## 2024-05-16 RX ADMIN — Medication 500 MILLIGRAM(S): at 09:53

## 2024-05-16 RX ADMIN — CARVEDILOL PHOSPHATE 6.25 MILLIGRAM(S): 80 CAPSULE, EXTENDED RELEASE ORAL at 17:13

## 2024-05-16 RX ADMIN — SODIUM CHLORIDE 4 MILLILITER(S): 9 INJECTION INTRAMUSCULAR; INTRAVENOUS; SUBCUTANEOUS at 11:51

## 2024-05-16 NOTE — PROGRESS NOTE ADULT - SUBJECTIVE AND OBJECTIVE BOX
[facility]   MD MARIBELL CARRION MD ANGELA WONG, PA QIAN CHEN, SHAVONNE    TEL:  OFFICE: [phone]  From 5pm-7am Answering Service [phone]    -- RENAL FOLLOW UP NOTE ---Date of Service 05-16-24 @ 16:21    Patient is a 69y old  Male who presents with a chief complaint of nausea and vomiting.    Patient seen and examined at bedside. No chest pain/SOB.    VITALS:  T(F): 98.1 (05-16-24 @ 10:35), Max: 98.1 (05-16-24 @ 05:30)  HR: 68 (05-16-24 @ 16:02)  BP: 147/62 (05-16-24 @ 10:35)  RR: 18 (05-16-24 @ 10:35)  SpO2: 100% (05-16-24 @ 10:35)  Wt(kg): --    05-15 @ 07:01  -  05-16 @ 07:00  --------------------------------------------------------  IN: 600 mL / OUT: 1900 mL / NET: -1300 mL      PHYSICAL EXAM:  General: NAD  Neck: No JVD  Respiratory: CTAB, no wheezes, rales or rhonchi  Cardiovascular: S1, S2, RRR  Gastrointestinal: BS+, soft, NT/ND  Extremities: No peripheral edema    Hospital Medications:   MEDICATIONS  (STANDING):  albuterol/ipratropium for Nebulization 3 milliLiter(s) Nebulizer every 6 hours  ascorbic acid 500 milliGRAM(s) Oral daily  aspirin enteric coated 81 milliGRAM(s) Oral daily  atorvastatin 40 milliGRAM(s) Oral at bedtime  carvedilol 6.25 milliGRAM(s) Oral every 12 hours  chlorhexidine 2% Cloths 1 Application(s) Topical daily  dextrose 50% Injectable 25 Gram(s) IV Push once  dextrose 50% Injectable 25 Gram(s) IV Push once  dextrose 50% Injectable 12.5 Gram(s) IV Push once  dextrose Oral Gel 15 Gram(s) Oral once  epoetin dinah (EPOGEN) Injectable 85712 Unit(s) IV Push <User Schedule>  glucagon  Injectable 1 milliGRAM(s) IntraMuscular once  heparin   Injectable 5000 Unit(s) SubCutaneous every 12 hours  lactobacillus acidophilus 1 Tablet(s) Oral daily  lactulose Syrup 10 Gram(s) Oral daily  Nephro-hannah 1 Tablet(s) Oral daily  pantoprazole    Tablet 40 milliGRAM(s) Oral before breakfast  polyethylene glycol 3350 17 Gram(s) Oral daily  senna 2 Tablet(s) Oral at bedtime  sodium chloride 3%  Inhalation 4 milliLiter(s) Inhalation every 6 hours      LABS:  05-16    136  |  96<L>  |  16  ----------------------------<  64<L>  4.4   |  25  |  3.28<H>    Ca    9.1      16 May 2024 05:00  Phos  3.1     05-16  Mg     2.50     05-16      Creatinine Trend: 3.28 <--, 5.12 <--, 3.94 <--, 5.59 <--, 4.53 <--, 3.58 <--, 4.22 <--    Phosphorus: 3.1 mg/dL (05-16 @ 05:00)                 11.3   3.62  )-----------( 140      ( 16 May 2024 05:00 )             36.4     Urine Studies:  Urinalysis - [05-16-24 @ 05:00]      Color  / Appearance  / SG  / pH       Gluc 64 / Ketone   / Bili  / Urobili        Blood  / Protein  / Leuk Est  / Nitrite       RBC  / WBC  / Hyaline  / Gran  / Sq Epi  / Non Sq Epi  / Bacteria       Iron 42, TIBC 127, %sat 33      [05-11-24 @ 07:26]  Ferritin 1680      [05-11-24 @ 07:26]  PTH -- (Ca --)      [04-28-24 @ 05:00]   155  HbA1c 4.2      [07-19-19 @ 09:56]  TSH 1.96      [05-07-24 @ 04:54]  Lipid: chol 119, TG 66, HDL 46, LDL --      [10-07-23 @ 09:30]    HBsAb 204.3      [05-03-24 @ 11:25]  HBsAg Nonreact      [05-04-24 @ 16:30]  HBcAb Nonreact      [05-03-24 @ 11:25]  HCV 0.12, Nonreact      [05-04-24 @ 16:30]

## 2024-05-16 NOTE — PROGRESS NOTE ADULT - PROVIDER SPECIALTY LIST ADULT
Anesthesia
Infectious Disease
Internal Medicine
Nephrology
Surgery
Cardiology
Hospitalist
Infectious Disease
Nephrology
Hospitalist
Nephrology
Hospitalist
Infectious Disease
Nephrology
Hospitalist
Internal Medicine
Hospitalist
Internal Medicine
Hospitalist

## 2024-05-16 NOTE — PROGRESS NOTE ADULT - REASON FOR ADMISSION
nausea and vomiting

## 2024-05-16 NOTE — PROGRESS NOTE ADULT - NS ATTEND AMEND GEN_ALL_CORE FT
HD as planned
HD as planned
HD as planned  increase carvedilol dose
hd as above
HD as planned
HD today
hd as above
HD as planned
HD today
HD today in view of hyperkalemia and hyponatremia  ON CALL HD RN notified

## 2024-05-16 NOTE — PROVIDER CONTACT NOTE (HYPOGLYCEMIA EVENT) - NS PROVIDER CONTACT BACKGROUND-HYPO
Age: 69y    Gender: Male    POCT Blood Glucose:  203 mg/dL (05-16-24 @ 12:01)  72 mg/dL (05-16-24 @ 11:11)  60 mg/dL (05-16-24 @ 11:10)  87 mg/dL (05-16-24 @ 06:33)  105 mg/dL (05-16-24 @ 02:04)  88 mg/dL (05-15-24 @ 22:03)  226 mg/dL (05-15-24 @ 17:54)  81 mg/dL (05-15-24 @ 16:18)      eMAR:atorvastatin   40 milliGRAM(s) Oral (05-15-24 @ 22:06)    dextrose 50% Injectable   25 Gram(s) IV Push (05-15-24 @ 17:34)    dextrose 50% Injectable   25 Gram(s) IV Push (05-16-24 @ 11:44)    
Age: 69y    Gender: Male    POCT Blood Glucose:  226 mg/dL (05-15-24 @ 17:54)  81 mg/dL (05-15-24 @ 16:18)  83 mg/dL (05-15-24 @ 11:17)  81 mg/dL (05-15-24 @ 09:36)  204 mg/dL (05-15-24 @ 06:05)  59 mg/dL (05-15-24 @ 05:34)  59 mg/dL (05-15-24 @ 05:31)  117 mg/dL (05-15-24 @ 01:03)      eMAR:atorvastatin   40 milliGRAM(s) Oral (05-14-24 @ 21:06)    dextrose 50% Injectable   25 Gram(s) IV Push (05-15-24 @ 17:34)    dextrose 50% Injectable   12.5 Gram(s) IV Push (05-15-24 @ 05:45)    
Age: 69y    Gender: Male    POCT Blood Glucose:  275 mg/dL (05-10-24 @ 07:47)  67 mg/dL (05-10-24 @ 07:19)  67 mg/dL (05-10-24 @ 07:17)  131 mg/dL (05-09-24 @ 21:46)  87 mg/dL (05-09-24 @ 16:31)  84 mg/dL (05-09-24 @ 11:19)      eMAR:atorvastatin   40 milliGRAM(s) Oral (05-09-24 @ 21:18)    dextrose 50% Injectable   25 Gram(s) IV Push (05-10-24 @ 07:36)    
Age: 69y    Gender: Male    POCT Blood Glucose:  132 mg/dL (05-14-24 @ 17:19)  67 mg/dL (05-14-24 @ 16:36)  69 mg/dL (05-14-24 @ 16:34)  121 mg/dL (05-14-24 @ 11:16)  80 mg/dL (05-14-24 @ 08:58)  81 mg/dL (05-13-24 @ 21:03)      eMAR:atorvastatin   40 milliGRAM(s) Oral (05-13-24 @ 23:06)    dextrose Oral Gel   15 Gram(s) Oral (05-14-24 @ 16:45)    
Age: 69y    Gender: Male    POCT Blood Glucose:  202 mg/dL (05-07-24 @ 07:29)  67 mg/dL (05-07-24 @ 07:01)  59 mg/dL (05-07-24 @ 06:53)  137 mg/dL (05-06-24 @ 21:53)  121 mg/dL (05-06-24 @ 18:17)  98 mg/dL (05-06-24 @ 16:33)  85 mg/dL (05-06-24 @ 16:30)  73 mg/dL (05-06-24 @ 15:57)      eMAR:atorvastatin   40 milliGRAM(s) Oral (05-06-24 @ 22:00)    dextrose 50% Injectable   12.5 Gram(s) IV Push (05-07-24 @ 07:11)

## 2024-05-16 NOTE — PROGRESS NOTE ADULT - ASSESSMENT
69-year-old male with PMH end-stage renal on HD via left aVF, CVA, BKA/AKA, functionally quadriplegic, CAD, HTN, HLD, history of O2 as needed presents for evaluation of vomiting.  Brought from HD, started vomiting while receiving treatment.  Patient unable to endorse any specific complaints, per paperwork AO x 1/0 at baseline.  Patient denies chest pain or difficulty breathing, unable to really clarify about abdominal pain. Nephrology consulted for dialysis needs.    A/P   ESRD  Center: [facility]  Nephrologist: Dr. Navarro   Access: L AVF  MWF schedule.  Consent obtained from niece, and proxy, Ramonita Vincent via phone [phone]  Plan for HD tomorrow.  C/W MWF schedule inpatient.  Renal diet  Monitor BMP     HTN   BP acceptable  Low salt diet.  Consider increasing carvedilol to 12.5mg BID if BP remains suboptimal post HD.  UF with HD.  Monitor BP     Anemia  At goal.  TAINA w/ HD; hold for Hgb >11.  Not iron deficient.  Monitor Hb     Hyperkalemia   s/p HD 5/5, 5/6  K improved.  low K diet   Lokelma 10gm if K >5.3 on nonHD days  HD as scheduled.  monitor closely.    Hyponatremia   s/p HD 5/13  Fluid restriction to 1L/day.  UF w/ HD.  monitor     Acidosis   non AG + AG  Improved w/ HD.  monitor     CKD-MBD   - at goal.  PO4 low --> replete with Naphos 15mmol   Off binder.  Monitor Ca, PO4 daily     Nausea/Vomiting  CT A/P shows constipation and wall thickening, worrisome for stercoral colitis  s/p disimpaction.  Colorectal surgery f/u

## 2024-06-06 NOTE — PROGRESS NOTE ADULT - SUBJECTIVE AND OBJECTIVE BOX
5 (moderate pain) Patient is a 63y old  Male who presents with a chief complaint of 63M PMH DM, HTN, CKD p/w L foot pain x 3 days. (19 Feb 2018 10:39)      SUBJECTIVE / OVERNIGHT EVENTS:    MEDICATIONS  (STANDING):  atorvastatin 80 milliGRAM(s) Oral at bedtime  cyanocobalamin 1000 MICROGram(s) Oral daily  dextrose 5%. 1000 milliLiter(s) (50 mL/Hr) IV Continuous <Continuous>  dextrose 50% Injectable 25 Gram(s) IV Push once  dextrose 50% Injectable 25 Gram(s) IV Push once  furosemide    Tablet 60 milliGRAM(s) Oral daily  influenza   Vaccine 0.5 milliLiter(s) IntraMuscular once  insulin lispro (HumaLOG) corrective regimen sliding scale   SubCutaneous three times a day before meals  labetalol 200 milliGRAM(s) Oral three times a day  multivitamin 1 Tablet(s) Oral daily  NIFEdipine XL 90 milliGRAM(s) Oral daily  silver sulfADIAZINE 1% Cream 1 Application(s) Topical daily    MEDICATIONS  (PRN):  acetaminophen   Tablet. 650 milliGRAM(s) Oral every 6 hours PRN Moderate Pain (4 - 6)  benzocaine 15 mG/menthol 3.6 mG Lozenge 1 Lozenge Oral every 2 hours PRN Sore Throat  dextrose Gel 1 Dose(s) Oral once PRN Blood Glucose LESS THAN 70 milliGRAM(s)/deciliter  glucagon  Injectable 1 milliGRAM(s) IntraMuscular once PRN Glucose LESS THAN 70 milligrams/deciliter        CAPILLARY BLOOD GLUCOSE      POCT Blood Glucose.: 84 mg/dL (27 Mar 2018 06:10)  POCT Blood Glucose.: 107 mg/dL (26 Mar 2018 22:46)  POCT Blood Glucose.: 119 mg/dL (26 Mar 2018 17:06)  POCT Blood Glucose.: 177 mg/dL (26 Mar 2018 11:59)    I&O's Summary    26 Mar 2018 07:01  -  27 Mar 2018 07:00  --------------------------------------------------------  IN: 320 mL / OUT: 850 mL / NET: -530 mL        PHYSICAL EXAM:  GENERAL: NAD, well-developed  HEAD:  Atraumatic, Normocephalic  EYES: EOMI, PERRLA, conjunctiva and sclera clear  NECK: Supple, No JVD  CHEST/LUNG: Clear to auscultation bilaterally; No wheeze  HEART: Regular rate and rhythm; No murmurs, rubs, or gallops  ABDOMEN: Soft, Nontender, Nondistended; Bowel sounds present  EXTREMITIES:  2+ Peripheral Pulses, No clubbing, cyanosis, or edema  PSYCH: AAOx3  NEUROLOGY: non-focal  SKIN: No rashes or lesions    LABS:  (03-27 @ 06:10)                        11.3  5.90 )-----------( 268                 35.9    Neutrophils = -- (--%)  Lymphocytes = -- (--%)  Eosinophils = -- (--%)  Basophils = -- (--%)  Monocytes = -- (--%)  Bands = --%    WBC Trend: 5.90<--, 5.69<--, 5.67<--  Hb Trend: 11.3<--, 11.0<--, 11.4<--, 11.3<--, 11.2<--  Plt Trend: 268<--, 289<--, 268<--, 270<--, 287<--  03-27    141  |  101  |  90<H>  ----------------------------<  92  4.9   |  24  |  4.40<H>    Ca    9.5      27 Mar 2018 06:10  Phos  4.8     03-27  Mg     2.3     03-27      Creatinine Trend: 4.40<--, 4.69<--, 4.65<--, 4.53<--, 4.19<--, 4.41<--  ( 26 Mar 2018 05:45 )   PT: 11.3 SEC;   INR: 1.02 ;       PTT:45.0 SEC      Consultant(s) Notes Reviewed: Vascular Sx    Care Discussed with Consultants/Other Providers: Vascular Sx Patient is a 63y old  Male who presents with a chief complaint of 63M PMH DM, HTN, CKD p/w L foot pain x 3 days. (19 Feb 2018 10:39)      SUBJECTIVE / OVERNIGHT EVENTS: No acute overnight events. Patient able to tolerate lying flat. On RA.     MEDICATIONS  (STANDING):  atorvastatin 80 milliGRAM(s) Oral at bedtime  cyanocobalamin 1000 MICROGram(s) Oral daily  dextrose 5%. 1000 milliLiter(s) (50 mL/Hr) IV Continuous <Continuous>  dextrose 50% Injectable 25 Gram(s) IV Push once  dextrose 50% Injectable 25 Gram(s) IV Push once  furosemide    Tablet 60 milliGRAM(s) Oral daily  influenza   Vaccine 0.5 milliLiter(s) IntraMuscular once  insulin lispro (HumaLOG) corrective regimen sliding scale   SubCutaneous three times a day before meals  labetalol 200 milliGRAM(s) Oral three times a day  multivitamin 1 Tablet(s) Oral daily  NIFEdipine XL 90 milliGRAM(s) Oral daily  silver sulfADIAZINE 1% Cream 1 Application(s) Topical daily    MEDICATIONS  (PRN):  acetaminophen   Tablet. 650 milliGRAM(s) Oral every 6 hours PRN Moderate Pain (4 - 6)  benzocaine 15 mG/menthol 3.6 mG Lozenge 1 Lozenge Oral every 2 hours PRN Sore Throat  dextrose Gel 1 Dose(s) Oral once PRN Blood Glucose LESS THAN 70 milliGRAM(s)/deciliter  glucagon  Injectable 1 milliGRAM(s) IntraMuscular once PRN Glucose LESS THAN 70 milligrams/deciliter        CAPILLARY BLOOD GLUCOSE      POCT Blood Glucose.: 84 mg/dL (27 Mar 2018 06:10)  POCT Blood Glucose.: 107 mg/dL (26 Mar 2018 22:46)  POCT Blood Glucose.: 119 mg/dL (26 Mar 2018 17:06)  POCT Blood Glucose.: 177 mg/dL (26 Mar 2018 11:59)    I&O's Summary    26 Mar 2018 07:01  -  27 Mar 2018 07:00  --------------------------------------------------------  IN: 320 mL / OUT: 850 mL / NET: -530 mL        PHYSICAL EXAM:  GENERAL: NAD, well-developed  HEAD:  Atraumatic, Normocephalic  EYES: EOMI, PERRLA, conjunctiva and sclera clear  NECK: Supple, No JVD  CHEST/LUNG: Clear to auscultation bilaterally; No wheeze  HEART: Regular rate and rhythm; No murmurs, rubs, or gallops  ABDOMEN: Soft, Nontender, Nondistended; Bowel sounds present  EXTREMITIES:  2+ Peripheral Pulses, No clubbing, cyanosis, or edema  PSYCH: AAOx3  NEUROLOGY: non-focal  SKIN: No rashes or lesions    LABS:  (03-27 @ 06:10)                        11.3  5.90 )-----------( 268                 35.9    Neutrophils = -- (--%)  Lymphocytes = -- (--%)  Eosinophils = -- (--%)  Basophils = -- (--%)  Monocytes = -- (--%)  Bands = --%    WBC Trend: 5.90<--, 5.69<--, 5.67<--  Hb Trend: 11.3<--, 11.0<--, 11.4<--, 11.3<--, 11.2<--  Plt Trend: 268<--, 289<--, 268<--, 270<--, 287<--  03-27    141  |  101  |  90<H>  ----------------------------<  92  4.9   |  24  |  4.40<H>    Ca    9.5      27 Mar 2018 06:10  Phos  4.8     03-27  Mg     2.3     03-27      Creatinine Trend: 4.40<--, 4.69<--, 4.65<--, 4.53<--, 4.19<--, 4.41<--  ( 26 Mar 2018 05:45 )   PT: 11.3 SEC;   INR: 1.02 ;       PTT:45.0 SEC      Consultant(s) Notes Reviewed: Vascular Sx    Care Discussed with Consultants/Other Providers: Vascular Sx Patient is a 63y old  Male who presents with a chief complaint of 63M PMH DM, HTN, CKD p/w L foot pain x 3 days. (19 Feb 2018 10:39)      SUBJECTIVE / OVERNIGHT EVENTS: No acute overnight events. Patient able to tolerate lying flat. On RA.     MEDICATIONS  (STANDING):  atorvastatin 80 milliGRAM(s) Oral at bedtime  cyanocobalamin 1000 MICROGram(s) Oral daily  dextrose 5%. 1000 milliLiter(s) (50 mL/Hr) IV Continuous <Continuous>  dextrose 50% Injectable 25 Gram(s) IV Push once  dextrose 50% Injectable 25 Gram(s) IV Push once  furosemide    Tablet 60 milliGRAM(s) Oral daily  influenza   Vaccine 0.5 milliLiter(s) IntraMuscular once  insulin lispro (HumaLOG) corrective regimen sliding scale   SubCutaneous three times a day before meals  labetalol 200 milliGRAM(s) Oral three times a day  multivitamin 1 Tablet(s) Oral daily  NIFEdipine XL 90 milliGRAM(s) Oral daily  silver sulfADIAZINE 1% Cream 1 Application(s) Topical daily    MEDICATIONS  (PRN):  acetaminophen   Tablet. 650 milliGRAM(s) Oral every 6 hours PRN Moderate Pain (4 - 6)  benzocaine 15 mG/menthol 3.6 mG Lozenge 1 Lozenge Oral every 2 hours PRN Sore Throat  dextrose Gel 1 Dose(s) Oral once PRN Blood Glucose LESS THAN 70 milliGRAM(s)/deciliter  glucagon  Injectable 1 milliGRAM(s) IntraMuscular once PRN Glucose LESS THAN 70 milligrams/deciliter        CAPILLARY BLOOD GLUCOSE      POCT Blood Glucose.: 84 mg/dL (27 Mar 2018 06:10)  POCT Blood Glucose.: 107 mg/dL (26 Mar 2018 22:46)  POCT Blood Glucose.: 119 mg/dL (26 Mar 2018 17:06)  POCT Blood Glucose.: 177 mg/dL (26 Mar 2018 11:59)    I&O's Summary    26 Mar 2018 07:01  -  27 Mar 2018 07:00  --------------------------------------------------------  IN: 320 mL / OUT: 850 mL / NET: -530 mL        PHYSICAL EXAM:  GENERAL: NAD, well-developed  EYES: sclera clear  NECK: Supple, No JVD  CHEST/LUNG: Clear to auscultation bilaterally; No wheeze  HEART: Regular rate and rhythm; No murmurs, rubs, or gallops  ABDOMEN: Soft, Nontender, Nondistended; Bowel sounds present  EXTREMITIES: right BKA, left foot wrapped in dry gauze, no edema, mottled skin appearance  PSYCH: AAOx3  NEUROLOGY: non-focal    LABS:  (03-27 @ 06:10)                        11.3  5.90 )-----------( 268                 35.9    Neutrophils = -- (--%)  Lymphocytes = -- (--%)  Eosinophils = -- (--%)  Basophils = -- (--%)  Monocytes = -- (--%)  Bands = --%    WBC Trend: 5.90<--, 5.69<--, 5.67<--  Hb Trend: 11.3<--, 11.0<--, 11.4<--, 11.3<--, 11.2<--  Plt Trend: 268<--, 289<--, 268<--, 270<--, 287<--  03-27    141  |  101  |  90<H>  ----------------------------<  92  4.9   |  24  |  4.40<H>    Ca    9.5      27 Mar 2018 06:10  Phos  4.8     03-27  Mg     2.3     03-27      Creatinine Trend: 4.40<--, 4.69<--, 4.65<--, 4.53<--, 4.19<--, 4.41<--  ( 26 Mar 2018 05:45 )   PT: 11.3 SEC;   INR: 1.02 ;       PTT:45.0 SEC      Consultant(s) Notes Reviewed: Vascular Sx    Care Discussed with Consultants/Other Providers: Vascular Sx

## 2024-06-12 RX ORDER — CALCIUM ACETATE 667 MG
1 TABLET ORAL
Refills: 0 | DISCHARGE

## 2024-06-12 RX ORDER — MIDODRINE HYDROCHLORIDE 2.5 MG/1
2 TABLET ORAL
Refills: 0 | DISCHARGE

## 2024-06-12 RX ORDER — PANTOPRAZOLE SODIUM 20 MG/1
1 TABLET, DELAYED RELEASE ORAL
Qty: 0 | Refills: 0 | DISCHARGE

## 2024-06-12 RX ORDER — ESOMEPRAZOLE MAGNESIUM 40 MG/1
1 CAPSULE, DELAYED RELEASE ORAL
Refills: 0 | DISCHARGE

## 2024-06-12 RX ORDER — ATORVASTATIN CALCIUM 80 MG/1
1 TABLET, FILM COATED ORAL
Qty: 0 | Refills: 0 | DISCHARGE

## 2024-06-12 RX ORDER — PREDNISOLONE SODIUM PHOSPHATE 1 %
1 DROPS OPHTHALMIC (EYE)
Qty: 0 | Refills: 0 | DISCHARGE

## 2024-06-12 RX ORDER — SITAGLIPTIN 50 MG/1
1 TABLET, FILM COATED ORAL
Qty: 0 | Refills: 0 | DISCHARGE

## 2024-06-12 RX ORDER — LACTULOSE 10 G/15ML
1 SOLUTION ORAL
Refills: 0 | DISCHARGE

## 2024-06-12 RX ORDER — LATANOPROST 0.05 MG/ML
1 SOLUTION/ DROPS OPHTHALMIC; TOPICAL
Qty: 0 | Refills: 0 | DISCHARGE

## 2024-06-12 RX ORDER — FUROSEMIDE 40 MG
1 TABLET ORAL
Refills: 0 | DISCHARGE

## 2024-06-12 RX ORDER — LABETALOL HCL 100 MG
1 TABLET ORAL
Qty: 0 | Refills: 0 | DISCHARGE

## 2024-06-12 RX ORDER — ASPIRIN/CALCIUM CARB/MAGNESIUM 324 MG
81 TABLET ORAL
Refills: 0 | DISCHARGE

## 2024-06-12 RX ORDER — CALCIUM ACETATE 667 MG
1 TABLET ORAL
Qty: 0 | Refills: 0 | DISCHARGE

## 2024-06-12 RX ORDER — NIFEDIPINE 30 MG
1 TABLET, EXTENDED RELEASE 24 HR ORAL
Qty: 0 | Refills: 0 | DISCHARGE

## 2024-06-12 RX ORDER — SENNA PLUS 8.6 MG/1
1 TABLET ORAL
Refills: 0 | DISCHARGE

## 2024-06-12 RX ORDER — ACETAMINOPHEN 500 MG
0 TABLET ORAL
Refills: 0 | DISCHARGE

## 2024-06-12 RX ORDER — ATORVASTATIN CALCIUM 80 MG/1
1 TABLET, FILM COATED ORAL
Refills: 0 | DISCHARGE

## 2024-06-12 RX ORDER — LACTOBACILLUS ACIDOPHILUS 100MM CELL
1 CAPSULE ORAL
Refills: 0 | DISCHARGE

## 2024-06-12 RX ORDER — PREGABALIN 225 MG/1
1 CAPSULE ORAL
Qty: 0 | Refills: 0 | DISCHARGE

## 2024-06-21 ENCOUNTER — APPOINTMENT (OUTPATIENT)
Dept: ELECTROPHYSIOLOGY | Facility: CLINIC | Age: 69
End: 2024-06-21
Payer: MEDICAID

## 2024-06-21 ENCOUNTER — NON-APPOINTMENT (OUTPATIENT)
Age: 69
End: 2024-06-21

## 2024-06-21 PROBLEM — N18.9 CHRONIC KIDNEY DISEASE, UNSPECIFIED: Chronic | Status: ACTIVE | Noted: 2018-05-19

## 2024-06-21 PROBLEM — Z22.322 CARRIER OR SUSPECTED CARRIER OF METHICILLIN RESISTANT STAPHYLOCOCCUS AUREUS: Chronic | Status: ACTIVE | Noted: 2018-05-19

## 2024-06-21 PROBLEM — Z89.511 ACQUIRED ABSENCE OF RIGHT LEG BELOW KNEE: Chronic | Status: ACTIVE | Noted: 2018-05-19

## 2024-06-21 PROBLEM — I10 ESSENTIAL (PRIMARY) HYPERTENSION: Chronic | Status: ACTIVE | Noted: 2019-07-08

## 2024-06-21 PROBLEM — T14.90XA INJURY, UNSPECIFIED, INITIAL ENCOUNTER: Chronic | Status: ACTIVE | Noted: 2018-05-19

## 2024-06-21 PROBLEM — E11.9 TYPE 2 DIABETES MELLITUS WITHOUT COMPLICATIONS: Chronic | Status: ACTIVE | Noted: 2019-07-08

## 2024-06-21 PROBLEM — N28.9 DISORDER OF KIDNEY AND URETER, UNSPECIFIED: Chronic | Status: ACTIVE | Noted: 2019-07-08

## 2024-06-21 PROBLEM — E11.9 TYPE 2 DIABETES MELLITUS WITHOUT COMPLICATIONS: Chronic | Status: ACTIVE | Noted: 2017-02-28

## 2024-06-21 PROBLEM — I10 ESSENTIAL (PRIMARY) HYPERTENSION: Chronic | Status: ACTIVE | Noted: 2017-02-28

## 2024-06-21 PROCEDURE — 93298 REM INTERROG DEV EVAL SCRMS: CPT

## 2024-07-03 ENCOUNTER — INPATIENT (INPATIENT)
Facility: HOSPITAL | Age: 69
LOS: 5 days | Discharge: NURSING, REHAB OR LONG-TERM CARE | End: 2024-07-09
Attending: INTERNAL MEDICINE | Admitting: INTERNAL MEDICINE
Payer: MEDICAID

## 2024-07-03 VITALS
DIASTOLIC BLOOD PRESSURE: 69 MMHG | SYSTOLIC BLOOD PRESSURE: 134 MMHG | RESPIRATION RATE: 14 BRPM | OXYGEN SATURATION: 87 % | HEART RATE: 73 BPM | TEMPERATURE: 99 F

## 2024-07-03 DIAGNOSIS — Z89.511 ACQUIRED ABSENCE OF RIGHT LEG BELOW KNEE: Chronic | ICD-10-CM

## 2024-07-03 DIAGNOSIS — Z89.612 ACQUIRED ABSENCE OF LEFT LEG ABOVE KNEE: Chronic | ICD-10-CM

## 2024-07-03 DIAGNOSIS — Z98.890 OTHER SPECIFIED POSTPROCEDURAL STATES: Chronic | ICD-10-CM

## 2024-07-03 DIAGNOSIS — R41.82 ALTERED MENTAL STATUS, UNSPECIFIED: ICD-10-CM

## 2024-07-03 DIAGNOSIS — Z89.611 ACQUIRED ABSENCE OF RIGHT LEG ABOVE KNEE: Chronic | ICD-10-CM

## 2024-07-03 LAB
ADD ON TEST-SPECIMEN IN LAB: SIGNIFICANT CHANGE UP
ADD ON TEST-SPECIMEN IN LAB: SIGNIFICANT CHANGE UP
ALBUMIN SERPL ELPH-MCNC: 3.1 G/DL — LOW (ref 3.3–5)
ALBUMIN SERPL ELPH-MCNC: 3.1 G/DL — LOW (ref 3.3–5)
ALP SERPL-CCNC: 101 U/L — SIGNIFICANT CHANGE UP (ref 40–120)
ALP SERPL-CCNC: 106 U/L — SIGNIFICANT CHANGE UP (ref 40–120)
ALT FLD-CCNC: 13 U/L — SIGNIFICANT CHANGE UP (ref 4–41)
ALT FLD-CCNC: 8 U/L — SIGNIFICANT CHANGE UP (ref 4–41)
ANION GAP SERPL CALC-SCNC: 12 MMOL/L — SIGNIFICANT CHANGE UP (ref 7–14)
ANION GAP SERPL CALC-SCNC: 14 MMOL/L — SIGNIFICANT CHANGE UP (ref 7–14)
APPEARANCE UR: ABNORMAL
APTT BLD: 30.4 SEC — SIGNIFICANT CHANGE UP (ref 24.5–35.6)
AST SERPL-CCNC: 20 U/L — SIGNIFICANT CHANGE UP (ref 4–40)
AST SERPL-CCNC: 49 U/L — HIGH (ref 4–40)
BACTERIA # UR AUTO: ABNORMAL /HPF
BASE EXCESS BLDV CALC-SCNC: 12.4 MMOL/L — HIGH (ref -2–3)
BASOPHILS # BLD AUTO: 0.04 K/UL — SIGNIFICANT CHANGE UP (ref 0–0.2)
BASOPHILS NFR BLD AUTO: 0.6 % — SIGNIFICANT CHANGE UP (ref 0–2)
BILIRUB SERPL-MCNC: 0.3 MG/DL — SIGNIFICANT CHANGE UP (ref 0.2–1.2)
BILIRUB SERPL-MCNC: 0.3 MG/DL — SIGNIFICANT CHANGE UP (ref 0.2–1.2)
BILIRUB UR-MCNC: NEGATIVE — SIGNIFICANT CHANGE UP
BUN SERPL-MCNC: 51 MG/DL — HIGH (ref 7–23)
BUN SERPL-MCNC: 53 MG/DL — HIGH (ref 7–23)
CA-I SERPL-SCNC: 1.17 MMOL/L — SIGNIFICANT CHANGE UP (ref 1.15–1.33)
CALCIUM SERPL-MCNC: 8.9 MG/DL — SIGNIFICANT CHANGE UP (ref 8.4–10.5)
CALCIUM SERPL-MCNC: 8.9 MG/DL — SIGNIFICANT CHANGE UP (ref 8.4–10.5)
CAST: 6 /LPF — HIGH (ref 0–4)
CHLORIDE BLDV-SCNC: 101 MMOL/L — SIGNIFICANT CHANGE UP (ref 96–108)
CHLORIDE SERPL-SCNC: 100 MMOL/L — SIGNIFICANT CHANGE UP (ref 98–107)
CHLORIDE SERPL-SCNC: 99 MMOL/L — SIGNIFICANT CHANGE UP (ref 98–107)
CO2 BLDV-SCNC: 39.9 MMOL/L — HIGH (ref 22–26)
CO2 SERPL-SCNC: 28 MMOL/L — SIGNIFICANT CHANGE UP (ref 22–31)
CO2 SERPL-SCNC: 29 MMOL/L — SIGNIFICANT CHANGE UP (ref 22–31)
COLOR SPEC: SIGNIFICANT CHANGE UP
CREAT SERPL-MCNC: 5.15 MG/DL — HIGH (ref 0.5–1.3)
CREAT SERPL-MCNC: 5.49 MG/DL — HIGH (ref 0.5–1.3)
DIFF PNL FLD: ABNORMAL
EGFR: 10 ML/MIN/1.73M2 — LOW
EGFR: 11 ML/MIN/1.73M2 — LOW
EOSINOPHIL # BLD AUTO: 0.17 K/UL — SIGNIFICANT CHANGE UP (ref 0–0.5)
EOSINOPHIL NFR BLD AUTO: 2.7 % — SIGNIFICANT CHANGE UP (ref 0–6)
GAS PNL BLDV: 134 MMOL/L — LOW (ref 136–145)
GAS PNL BLDV: SIGNIFICANT CHANGE UP
GAS PNL BLDV: SIGNIFICANT CHANGE UP
GLUCOSE BLDV-MCNC: 122 MG/DL — HIGH (ref 70–99)
GLUCOSE SERPL-MCNC: 111 MG/DL — HIGH (ref 70–99)
GLUCOSE SERPL-MCNC: 121 MG/DL — HIGH (ref 70–99)
GLUCOSE UR QL: NEGATIVE MG/DL — SIGNIFICANT CHANGE UP
HCO3 BLDV-SCNC: 38 MMOL/L — HIGH (ref 22–29)
HCT VFR BLD CALC: 35 % — LOW (ref 39–50)
HCT VFR BLDA CALC: 32 % — LOW (ref 39–51)
HGB BLD CALC-MCNC: 10.7 G/DL — LOW (ref 12.6–17.4)
HGB BLD-MCNC: 10.7 G/DL — LOW (ref 13–17)
IANC: 4.65 K/UL — SIGNIFICANT CHANGE UP (ref 1.8–7.4)
IMM GRANULOCYTES NFR BLD AUTO: 0.3 % — SIGNIFICANT CHANGE UP (ref 0–0.9)
INR BLD: 1.01 RATIO — SIGNIFICANT CHANGE UP (ref 0.85–1.18)
KETONES UR-MCNC: NEGATIVE MG/DL — SIGNIFICANT CHANGE UP
LACTATE BLDV-MCNC: 1.8 MMOL/L — SIGNIFICANT CHANGE UP (ref 0.5–2)
LEUKOCYTE ESTERASE UR-ACNC: ABNORMAL
LIDOCAIN IGE QN: 102 U/L — HIGH (ref 7–60)
LYMPHOCYTES # BLD AUTO: 0.76 K/UL — LOW (ref 1–3.3)
LYMPHOCYTES # BLD AUTO: 12.1 % — LOW (ref 13–44)
MAGNESIUM SERPL-MCNC: 3.1 MG/DL — HIGH (ref 1.6–2.6)
MCHC RBC-ENTMCNC: 26.8 PG — LOW (ref 27–34)
MCHC RBC-ENTMCNC: 30.6 GM/DL — LOW (ref 32–36)
MCV RBC AUTO: 87.7 FL — SIGNIFICANT CHANGE UP (ref 80–100)
MONOCYTES # BLD AUTO: 0.63 K/UL — SIGNIFICANT CHANGE UP (ref 0–0.9)
MONOCYTES NFR BLD AUTO: 10 % — SIGNIFICANT CHANGE UP (ref 2–14)
NEUTROPHILS # BLD AUTO: 4.65 K/UL — SIGNIFICANT CHANGE UP (ref 1.8–7.4)
NEUTROPHILS NFR BLD AUTO: 74.3 % — SIGNIFICANT CHANGE UP (ref 43–77)
NITRITE UR-MCNC: NEGATIVE — SIGNIFICANT CHANGE UP
NRBC # BLD: 0 /100 WBCS — SIGNIFICANT CHANGE UP (ref 0–0)
NRBC # FLD: 0 K/UL — SIGNIFICANT CHANGE UP (ref 0–0)
PCO2 BLDV: 55 MMHG — SIGNIFICANT CHANGE UP (ref 42–55)
PH BLDV: 7.45 — HIGH (ref 7.32–7.43)
PH UR: 7.5 — SIGNIFICANT CHANGE UP (ref 5–8)
PHOSPHATE SERPL-MCNC: 2.2 MG/DL — LOW (ref 2.5–4.5)
PLATELET # BLD AUTO: 192 K/UL — SIGNIFICANT CHANGE UP (ref 150–400)
PO2 BLDV: 56 MMHG — HIGH (ref 25–45)
POTASSIUM BLDV-SCNC: SIGNIFICANT CHANGE UP MMOL/L (ref 3.5–5.1)
POTASSIUM SERPL-MCNC: 4.2 MMOL/L — SIGNIFICANT CHANGE UP (ref 3.5–5.3)
POTASSIUM SERPL-MCNC: 6.6 MMOL/L — CRITICAL HIGH (ref 3.5–5.3)
POTASSIUM SERPL-SCNC: 4.2 MMOL/L — SIGNIFICANT CHANGE UP (ref 3.5–5.3)
POTASSIUM SERPL-SCNC: 6.6 MMOL/L — CRITICAL HIGH (ref 3.5–5.3)
PROT SERPL-MCNC: 7.4 G/DL — SIGNIFICANT CHANGE UP (ref 6–8.3)
PROT SERPL-MCNC: 7.9 G/DL — SIGNIFICANT CHANGE UP (ref 6–8.3)
PROT UR-MCNC: 300 MG/DL
PROTHROM AB SERPL-ACNC: 11.4 SEC — SIGNIFICANT CHANGE UP (ref 9.5–13)
RBC # BLD: 3.99 M/UL — LOW (ref 4.2–5.8)
RBC # FLD: 20.2 % — HIGH (ref 10.3–14.5)
RBC CASTS # UR COMP ASSIST: 38 /HPF — HIGH (ref 0–4)
REVIEW: SIGNIFICANT CHANGE UP
SAO2 % BLDV: 90.1 % — HIGH (ref 67–88)
SODIUM SERPL-SCNC: 141 MMOL/L — SIGNIFICANT CHANGE UP (ref 135–145)
SODIUM SERPL-SCNC: 141 MMOL/L — SIGNIFICANT CHANGE UP (ref 135–145)
SP GR SPEC: 1.02 — SIGNIFICANT CHANGE UP (ref 1–1.03)
SQUAMOUS # UR AUTO: 0 /HPF — SIGNIFICANT CHANGE UP (ref 0–5)
TROPONIN T, HIGH SENSITIVITY RESULT: 520 NG/L — CRITICAL HIGH
UROBILINOGEN FLD QL: 0.2 MG/DL — SIGNIFICANT CHANGE UP (ref 0.2–1)
WBC # BLD: 6.27 K/UL — SIGNIFICANT CHANGE UP (ref 3.8–10.5)
WBC # FLD AUTO: 6.27 K/UL — SIGNIFICANT CHANGE UP (ref 3.8–10.5)
WBC UR QL: 43 /HPF — HIGH (ref 0–5)

## 2024-07-03 PROCEDURE — 99291 CRITICAL CARE FIRST HOUR: CPT

## 2024-07-03 PROCEDURE — 71045 X-RAY EXAM CHEST 1 VIEW: CPT | Mod: 26

## 2024-07-03 PROCEDURE — 70450 CT HEAD/BRAIN W/O DYE: CPT | Mod: 26,MC

## 2024-07-03 RX ORDER — CEFTRIAXONE SODIUM 500 MG
1000 VIAL (EA) INJECTION ONCE
Refills: 0 | Status: COMPLETED | OUTPATIENT
Start: 2024-07-03 | End: 2024-07-03

## 2024-07-03 RX ORDER — SODIUM CHLORIDE 0.9 % (FLUSH) 0.9 %
100 SYRINGE (ML) INJECTION
Refills: 0 | Status: DISCONTINUED | OUTPATIENT
Start: 2024-07-03 | End: 2024-07-09

## 2024-07-03 RX ADMIN — Medication 100 MILLIGRAM(S): at 22:28

## 2024-07-03 NOTE — ED PROVIDER NOTE - OBJECTIVE STATEMENT
69-year-old male past medical history dementia (ANO x 2), ESRD on HD via left aVF Monday Wednesday Friday, CVA, functional quadriplegia, CAD, HTN, HLD presents to ED [facility] for unresponsiveness.  Few hours attempting to bring patient to dialysis center.  Noted to have bilateral hand drop.  Patient is a poor historian.  Placed on the nonrebreather after desatting and appearing obtunded.

## 2024-07-03 NOTE — ED ADULT NURSE REASSESSMENT NOTE - NS ED NURSE REASSESS COMMENT FT1
Report given to dialysis RN Lorena. Pt stable for transport, is admitted to medicine. Remains A&Ox1, breathing even and unlabored on 2L NC. Safety maintained.

## 2024-07-03 NOTE — CONSULT NOTE ADULT - ASSESSMENT
ESRD:  On HD TIW via [facility].  Schedule is MWF.  - Will arrange for HD.  - Renal diet.    Unresponsiveness:  Rule out CVA.      Hyperkalemia:  Specimen is hemolyzed.  EKG not consistent with Hyperkalemia.  - HD tonight.

## 2024-07-03 NOTE — ED ADULT NURSE NOTE - OBJECTIVE STATEMENT
pt is a 69y old male with hx ESRD on HD, sent from NH for eval, lethargy, weakness, last HD treatment on Saturday, labs drawn and sent as per MD order, small sacral stage 2 decub noted

## 2024-07-03 NOTE — ED ADULT TRIAGE NOTE - CHIEF COMPLAINT QUOTE
Pt arrives via EMS from [facility] for AMS, first noticed by staff at 16:30 today. Unknown LKN. Baseline a&ox2. Pt non-verbal currently. Pt unable to participate in neuro exam, moans in response to repeated tactile stimulation. NRB applied for hypoxia. PMHx: CVA, DM2, HTN, ESRD (HD M/W/F - missed today), afib, right BKA, left AKA. MD Agarwal contacted for stroke eval, no code called. Charge notified, taken directly to rm 15.

## 2024-07-03 NOTE — ED PROVIDER NOTE - PROGRESS NOTE DETAILS
BECKI Pleitez: Patient received at signout pending labs and reassessment.  Labs notable for mild anemia hemoglobin of 10.7 with hematocrit of 35.  On CMP patient has elevated potassium of 6.6 which is hemolyzed therefore will be repeated, pending.  Creatinine is 5.15.  Baseline 3.2 an initial troponin is 520.  Will repeat, pending.    Upon reassessment, patient still altered only responsive to painful stimuli.  Lungs are clear to oxygenation, heart regular rate and rhythm.  Abdomen soft nontender.    Case discussed with nephrologist on-call for [facility] nephrology who states that they will be in to see patient and to get patient admitted for dialysis likely.  Hospitalist paged. BECKI Pleitez: Repeat troponin unchanged remains 520.  Pending CK-MB and CK total.  Urinalysis notable for bacteria and moderate leuks, treated with Rocephin.  Again discussed with hospitalist  following repeat troponin who accepts admission.

## 2024-07-03 NOTE — ED PROVIDER NOTE - CLINICAL SUMMARY MEDICAL DECISION MAKING FREE TEXT BOX
Afebrile hemodynamically stable male saturating well on room air appearing unresponsive.  Responding with sternal rub. ANO x 1; Opening eyes and responding to commands with normal strength bilateral extremities.  Ordered basic labs, blood cultures, UA cultures, mag/Phos, CT head Noncon to rule out ICH vs Electrolyte abnormality versus infectious etiology versus bacteremia.  Will reassess.

## 2024-07-03 NOTE — ED ADULT NURSE REASSESSMENT NOTE - NS ED NURSE REASSESS COMMENT FT1
Pt resting in stretcher, breathing even and unlabored on 2L NC. Pt is currently A&Ox1, able to tell his full name but does not know his birthday, the year, or his current location. Pt is calm and cooperative, vitals stable. Straight cath for urine sample yielded 20cc of dark mark urine with sediment. Labs collected as ordered, medicated as ordered, pending nephrology consult. Dialysis access noted to left arm, good bruit and thrill. Safety maintained.

## 2024-07-03 NOTE — ED PROVIDER NOTE - ATTENDING CONTRIBUTION TO CARE
68 yo M with h/o dementia (ANO x 2), ESRD on HD via left aVF Monday Wednesday Friday, CVA, functional quadriplegia, CAD, HTN, HLD who presents to the ED for unresponsiveness. The pt is presenting from a nursing home and per EMS they came to get him for dialysis at 430 PM and found him to be altered. Unsure when the last time someone had seen him prior to that. In the ED, pt not speaking- will open eyes to sternal rub but not answering questions although he can state that he is at a hospital. He is moving both arms- otherwise has a nonfocal neuro exam. He is mildly hypoxic to the low 90's on room air. Plan for labs, CT head, CXR, UA. Pt will need admission

## 2024-07-03 NOTE — ED ADULT NURSE NOTE - CHIEF COMPLAINT QUOTE
Pt arrives via EMS from [facility] for AMS, first noticed by staff at 16:30 today. Unknown LKN. Baseline a&ox2. Pt non-verbal currently. Pt unable to participate in neuro exam, moans in response to repeated tactile stimulation. NRB applied for hypoxia. PMHx: CVA, DM2, HTN, ESRD (HD M/W/F - missed today), afib, right BKA, left AKA. MD Agarwal contacted for stroke eval, no code called. Charge notified, taken directly to rm 15.

## 2024-07-04 DIAGNOSIS — Z86.73 PERSONAL HISTORY OF TRANSIENT ISCHEMIC ATTACK (TIA), AND CEREBRAL INFARCTION WITHOUT RESIDUAL DEFICITS: ICD-10-CM

## 2024-07-04 DIAGNOSIS — R79.89 OTHER SPECIFIED ABNORMAL FINDINGS OF BLOOD CHEMISTRY: ICD-10-CM

## 2024-07-04 DIAGNOSIS — R63.8 OTHER SYMPTOMS AND SIGNS CONCERNING FOOD AND FLUID INTAKE: ICD-10-CM

## 2024-07-04 DIAGNOSIS — F03.90 UNSPECIFIED DEMENTIA, UNSPECIFIED SEVERITY, WITHOUT BEHAVIORAL DISTURBANCE, PSYCHOTIC DISTURBANCE, MOOD DISTURBANCE, AND ANXIETY: ICD-10-CM

## 2024-07-04 DIAGNOSIS — G92.8 OTHER TOXIC ENCEPHALOPATHY: ICD-10-CM

## 2024-07-04 DIAGNOSIS — N18.6 END STAGE RENAL DISEASE: ICD-10-CM

## 2024-07-04 DIAGNOSIS — N39.0 URINARY TRACT INFECTION, SITE NOT SPECIFIED: ICD-10-CM

## 2024-07-04 LAB
ALBUMIN SERPL ELPH-MCNC: 3.5 G/DL — SIGNIFICANT CHANGE UP (ref 3.3–5)
ALP SERPL-CCNC: 106 U/L — SIGNIFICANT CHANGE UP (ref 40–120)
ALT FLD-CCNC: 10 U/L — SIGNIFICANT CHANGE UP (ref 4–41)
ANION GAP SERPL CALC-SCNC: 13 MMOL/L — SIGNIFICANT CHANGE UP (ref 7–14)
ANION GAP SERPL CALC-SCNC: 14 MMOL/L — SIGNIFICANT CHANGE UP (ref 7–14)
ANION GAP SERPL CALC-SCNC: 31 MMOL/L — HIGH (ref 7–14)
AST SERPL-CCNC: 20 U/L — SIGNIFICANT CHANGE UP (ref 4–40)
BASOPHILS # BLD AUTO: 0.04 K/UL — SIGNIFICANT CHANGE UP (ref 0–0.2)
BASOPHILS NFR BLD AUTO: 0.9 % — SIGNIFICANT CHANGE UP (ref 0–2)
BILIRUB SERPL-MCNC: 0.3 MG/DL — SIGNIFICANT CHANGE UP (ref 0.2–1.2)
BUN SERPL-MCNC: 16 MG/DL — SIGNIFICANT CHANGE UP (ref 7–23)
BUN SERPL-MCNC: 22 MG/DL — SIGNIFICANT CHANGE UP (ref 7–23)
BUN SERPL-MCNC: 25 MG/DL — HIGH (ref 7–23)
CALCIUM SERPL-MCNC: 7.2 MG/DL — LOW (ref 8.4–10.5)
CALCIUM SERPL-MCNC: 8.7 MG/DL — SIGNIFICANT CHANGE UP (ref 8.4–10.5)
CALCIUM SERPL-MCNC: 9.1 MG/DL — SIGNIFICANT CHANGE UP (ref 8.4–10.5)
CHLORIDE SERPL-SCNC: 80 MMOL/L — LOW (ref 98–107)
CHLORIDE SERPL-SCNC: 98 MMOL/L — SIGNIFICANT CHANGE UP (ref 98–107)
CHLORIDE SERPL-SCNC: 98 MMOL/L — SIGNIFICANT CHANGE UP (ref 98–107)
CO2 SERPL-SCNC: 22 MMOL/L — SIGNIFICANT CHANGE UP (ref 22–31)
CO2 SERPL-SCNC: 28 MMOL/L — SIGNIFICANT CHANGE UP (ref 22–31)
CO2 SERPL-SCNC: 29 MMOL/L — SIGNIFICANT CHANGE UP (ref 22–31)
CREAT SERPL-MCNC: 1.86 MG/DL — HIGH (ref 0.5–1.3)
CREAT SERPL-MCNC: 2.69 MG/DL — HIGH (ref 0.5–1.3)
CREAT SERPL-MCNC: 3.38 MG/DL — HIGH (ref 0.5–1.3)
CULTURE RESULTS: NO GROWTH — SIGNIFICANT CHANGE UP
DIALYSIS INSTRUMENT RESULT - HEPATITIS B SURFACE ANTIGEN: NEGATIVE — SIGNIFICANT CHANGE UP
EGFR: 19 ML/MIN/1.73M2 — LOW
EGFR: 25 ML/MIN/1.73M2 — LOW
EGFR: 38 ML/MIN/1.73M2 — LOW
EOSINOPHIL # BLD AUTO: 0.32 K/UL — SIGNIFICANT CHANGE UP (ref 0–0.5)
EOSINOPHIL NFR BLD AUTO: 6.9 % — HIGH (ref 0–6)
GLUCOSE BLDC GLUCOMTR-MCNC: 105 MG/DL — HIGH (ref 70–99)
GLUCOSE BLDC GLUCOMTR-MCNC: 123 MG/DL — HIGH (ref 70–99)
GLUCOSE BLDC GLUCOMTR-MCNC: 79 MG/DL — SIGNIFICANT CHANGE UP (ref 70–99)
GLUCOSE BLDC GLUCOMTR-MCNC: 93 MG/DL — SIGNIFICANT CHANGE UP (ref 70–99)
GLUCOSE SERPL-MCNC: 101 MG/DL — HIGH (ref 70–99)
GLUCOSE SERPL-MCNC: 701 MG/DL — CRITICAL HIGH (ref 70–99)
GLUCOSE SERPL-MCNC: 96 MG/DL — SIGNIFICANT CHANGE UP (ref 70–99)
HBV CORE AB SER-ACNC: SIGNIFICANT CHANGE UP
HBV CORE IGM SER-ACNC: SIGNIFICANT CHANGE UP
HBV SURFACE AB SER-ACNC: 55.8 MIU/ML — SIGNIFICANT CHANGE UP
HBV SURFACE AG SER-ACNC: SIGNIFICANT CHANGE UP
HCT VFR BLD CALC: 31.4 % — LOW (ref 39–50)
HCT VFR BLD CALC: 33.6 % — LOW (ref 39–50)
HCV AB S/CO SERPL IA: 0.11 S/CO — SIGNIFICANT CHANGE UP (ref 0–0.99)
HCV AB SERPL-IMP: SIGNIFICANT CHANGE UP
HGB BLD-MCNC: 10.5 G/DL — LOW (ref 13–17)
HGB BLD-MCNC: 9.9 G/DL — LOW (ref 13–17)
IANC: 3.46 K/UL — SIGNIFICANT CHANGE UP (ref 1.8–7.4)
IMM GRANULOCYTES NFR BLD AUTO: 0.2 % — SIGNIFICANT CHANGE UP (ref 0–0.9)
LYMPHOCYTES # BLD AUTO: 0.47 K/UL — LOW (ref 1–3.3)
LYMPHOCYTES # BLD AUTO: 10.2 % — LOW (ref 13–44)
MAGNESIUM SERPL-MCNC: 2.1 MG/DL — SIGNIFICANT CHANGE UP (ref 1.6–2.6)
MAGNESIUM SERPL-MCNC: 2.2 MG/DL — SIGNIFICANT CHANGE UP (ref 1.6–2.6)
MAGNESIUM SERPL-MCNC: 2.4 MG/DL — SIGNIFICANT CHANGE UP (ref 1.6–2.6)
MCHC RBC-ENTMCNC: 26.9 PG — LOW (ref 27–34)
MCHC RBC-ENTMCNC: 27 PG — SIGNIFICANT CHANGE UP (ref 27–34)
MCHC RBC-ENTMCNC: 31.3 GM/DL — LOW (ref 32–36)
MCHC RBC-ENTMCNC: 31.5 GM/DL — LOW (ref 32–36)
MCV RBC AUTO: 85.3 FL — SIGNIFICANT CHANGE UP (ref 80–100)
MCV RBC AUTO: 86.4 FL — SIGNIFICANT CHANGE UP (ref 80–100)
MONOCYTES # BLD AUTO: 0.32 K/UL — SIGNIFICANT CHANGE UP (ref 0–0.9)
MONOCYTES NFR BLD AUTO: 6.9 % — SIGNIFICANT CHANGE UP (ref 2–14)
MRSA PCR RESULT.: SIGNIFICANT CHANGE UP
NEUTROPHILS # BLD AUTO: 3.46 K/UL — SIGNIFICANT CHANGE UP (ref 1.8–7.4)
NEUTROPHILS NFR BLD AUTO: 74.9 % — SIGNIFICANT CHANGE UP (ref 43–77)
NRBC # BLD: 0 /100 WBCS — SIGNIFICANT CHANGE UP (ref 0–0)
NRBC # BLD: 0 /100 WBCS — SIGNIFICANT CHANGE UP (ref 0–0)
NRBC # FLD: 0 K/UL — SIGNIFICANT CHANGE UP (ref 0–0)
NRBC # FLD: 0 K/UL — SIGNIFICANT CHANGE UP (ref 0–0)
PHOSPHATE SERPL-MCNC: 0.8 MG/DL — CRITICAL LOW (ref 2.5–4.5)
PHOSPHATE SERPL-MCNC: 3.1 MG/DL — SIGNIFICANT CHANGE UP (ref 2.5–4.5)
PHOSPHATE SERPL-MCNC: 34.2 MG/DL — HIGH (ref 2.5–4.5)
PLATELET # BLD AUTO: 197 K/UL — SIGNIFICANT CHANGE UP (ref 150–400)
PLATELET # BLD AUTO: 206 K/UL — SIGNIFICANT CHANGE UP (ref 150–400)
POTASSIUM SERPL-MCNC: 2.7 MMOL/L — CRITICAL LOW (ref 3.5–5.3)
POTASSIUM SERPL-MCNC: 3.6 MMOL/L — SIGNIFICANT CHANGE UP (ref 3.5–5.3)
POTASSIUM SERPL-MCNC: 4.1 MMOL/L — SIGNIFICANT CHANGE UP (ref 3.5–5.3)
POTASSIUM SERPL-SCNC: 2.7 MMOL/L — CRITICAL LOW (ref 3.5–5.3)
POTASSIUM SERPL-SCNC: 3.6 MMOL/L — SIGNIFICANT CHANGE UP (ref 3.5–5.3)
POTASSIUM SERPL-SCNC: 4.1 MMOL/L — SIGNIFICANT CHANGE UP (ref 3.5–5.3)
PROT SERPL-MCNC: 8.5 G/DL — HIGH (ref 6–8.3)
RBC # BLD: 3.68 M/UL — LOW (ref 4.2–5.8)
RBC # BLD: 3.89 M/UL — LOW (ref 4.2–5.8)
RBC # FLD: 19.8 % — HIGH (ref 10.3–14.5)
RBC # FLD: 19.9 % — HIGH (ref 10.3–14.5)
S AUREUS DNA NOSE QL NAA+PROBE: SIGNIFICANT CHANGE UP
SODIUM SERPL-SCNC: 133 MMOL/L — LOW (ref 135–145)
SODIUM SERPL-SCNC: 140 MMOL/L — SIGNIFICANT CHANGE UP (ref 135–145)
SODIUM SERPL-SCNC: 140 MMOL/L — SIGNIFICANT CHANGE UP (ref 135–145)
SPECIMEN SOURCE: SIGNIFICANT CHANGE UP
WBC # BLD: 4.62 K/UL — SIGNIFICANT CHANGE UP (ref 3.8–10.5)
WBC # BLD: 6.28 K/UL — SIGNIFICANT CHANGE UP (ref 3.8–10.5)
WBC # FLD AUTO: 4.62 K/UL — SIGNIFICANT CHANGE UP (ref 3.8–10.5)
WBC # FLD AUTO: 6.28 K/UL — SIGNIFICANT CHANGE UP (ref 3.8–10.5)

## 2024-07-04 PROCEDURE — 99223 1ST HOSP IP/OBS HIGH 75: CPT

## 2024-07-04 RX ORDER — LORAZEPAM 0.5 MG
1 TABLET ORAL EVERY 8 HOURS
Refills: 0 | Status: DISCONTINUED | OUTPATIENT
Start: 2024-07-04 | End: 2024-07-09

## 2024-07-04 RX ORDER — DEXTROSE 30 % IN WATER 30 %
25 VIAL (ML) INTRAVENOUS ONCE
Refills: 0 | Status: DISCONTINUED | OUTPATIENT
Start: 2024-07-04 | End: 2024-07-09

## 2024-07-04 RX ORDER — DEXTROSE 30 % IN WATER 30 %
12.5 VIAL (ML) INTRAVENOUS ONCE
Refills: 0 | Status: DISCONTINUED | OUTPATIENT
Start: 2024-07-04 | End: 2024-07-09

## 2024-07-04 RX ORDER — POTASSIUM CHLORIDE 600 MG/1
20 TABLET, FILM COATED, EXTENDED RELEASE ORAL ONCE
Refills: 0 | Status: COMPLETED | OUTPATIENT
Start: 2024-07-04 | End: 2024-07-04

## 2024-07-04 RX ORDER — ONDANSETRON HYDROCHLORIDE 2 MG/ML
4 INJECTION INTRAMUSCULAR; INTRAVENOUS EVERY 8 HOURS
Refills: 0 | Status: DISCONTINUED | OUTPATIENT
Start: 2024-07-04 | End: 2024-07-09

## 2024-07-04 RX ORDER — DEXTROSE 30 % IN WATER 30 %
15 VIAL (ML) INTRAVENOUS ONCE
Refills: 0 | Status: DISCONTINUED | OUTPATIENT
Start: 2024-07-04 | End: 2024-07-09

## 2024-07-04 RX ORDER — MIDODRINE HYDROCHLORIDE 10 MG/1
5 TABLET ORAL ONCE
Refills: 0 | Status: DISCONTINUED | OUTPATIENT
Start: 2024-07-04 | End: 2024-07-04

## 2024-07-04 RX ORDER — ATORVASTATIN CALCIUM 20 MG/1
40 TABLET, FILM COATED ORAL AT BEDTIME
Refills: 0 | Status: DISCONTINUED | OUTPATIENT
Start: 2024-07-04 | End: 2024-07-09

## 2024-07-04 RX ORDER — LACTULOSE 10 G/15ML
10 SOLUTION ORAL DAILY
Refills: 0 | Status: DISCONTINUED | OUTPATIENT
Start: 2024-07-04 | End: 2024-07-09

## 2024-07-04 RX ORDER — ASPIRIN 325 MG/1
81 TABLET, FILM COATED ORAL DAILY
Refills: 0 | Status: DISCONTINUED | OUTPATIENT
Start: 2024-07-04 | End: 2024-07-09

## 2024-07-04 RX ORDER — LATANOPROST PF 0.05 MG/ML
1 SOLUTION/ DROPS OPHTHALMIC AT BEDTIME
Refills: 0 | Status: DISCONTINUED | OUTPATIENT
Start: 2024-07-04 | End: 2024-07-09

## 2024-07-04 RX ORDER — PANTOPRAZOLE SODIUM 40 MG/10ML
40 INJECTION, POWDER, FOR SOLUTION INTRAVENOUS DAILY
Refills: 0 | Status: DISCONTINUED | OUTPATIENT
Start: 2024-07-04 | End: 2024-07-09

## 2024-07-04 RX ORDER — SENNOSIDES 8.6 MG
2 TABLET ORAL AT BEDTIME
Refills: 0 | Status: DISCONTINUED | OUTPATIENT
Start: 2024-07-04 | End: 2024-07-09

## 2024-07-04 RX ORDER — MIRTAZAPINE 15 MG/1
15 TABLET, FILM COATED ORAL AT BEDTIME
Refills: 0 | Status: DISCONTINUED | OUTPATIENT
Start: 2024-07-04 | End: 2024-07-09

## 2024-07-04 RX ORDER — POLYETHYLENE GLYCOL 3350 1 G/G
17 POWDER ORAL DAILY
Refills: 0 | Status: DISCONTINUED | OUTPATIENT
Start: 2024-07-04 | End: 2024-07-09

## 2024-07-04 RX ORDER — PREDNISOLONE SODIUM PHOSPHATE 1 %
1 DROPS OPHTHALMIC (EYE) EVERY 12 HOURS
Refills: 0 | Status: DISCONTINUED | OUTPATIENT
Start: 2024-07-04 | End: 2024-07-09

## 2024-07-04 RX ORDER — HYDROXYZINE PAMOATE 50 MG/1
25 CAPSULE ORAL ONCE
Refills: 0 | Status: DISCONTINUED | OUTPATIENT
Start: 2024-07-04 | End: 2024-07-04

## 2024-07-04 RX ORDER — GLUCAGON HYDROCHLORIDE 1 MG/ML
1 INJECTION, POWDER, FOR SOLUTION INTRAMUSCULAR; INTRAVENOUS; SUBCUTANEOUS ONCE
Refills: 0 | Status: DISCONTINUED | OUTPATIENT
Start: 2024-07-04 | End: 2024-07-09

## 2024-07-04 RX ORDER — MIDODRINE HYDROCHLORIDE 10 MG/1
7.5 TABLET ORAL
Refills: 0 | Status: DISCONTINUED | OUTPATIENT
Start: 2024-07-04 | End: 2024-07-09

## 2024-07-04 RX ORDER — ACETAMINOPHEN 325 MG
650 TABLET ORAL EVERY 6 HOURS
Refills: 0 | Status: DISCONTINUED | OUTPATIENT
Start: 2024-07-04 | End: 2024-07-09

## 2024-07-04 RX ORDER — CEFTRIAXONE SODIUM 500 MG
1000 VIAL (EA) INJECTION EVERY 24 HOURS
Refills: 0 | Status: DISCONTINUED | OUTPATIENT
Start: 2024-07-04 | End: 2024-07-05

## 2024-07-04 RX ORDER — CARVEDILOL PHOSPHATE 80 MG/1
6.25 CAPSULE, EXTENDED RELEASE ORAL EVERY 12 HOURS
Refills: 0 | Status: DISCONTINUED | OUTPATIENT
Start: 2024-07-04 | End: 2024-07-09

## 2024-07-04 RX ADMIN — Medication 2 TABLET(S): at 21:33

## 2024-07-04 RX ADMIN — Medication 100 MILLIGRAM(S): at 23:27

## 2024-07-04 RX ADMIN — ASPIRIN 81 MILLIGRAM(S): 325 TABLET, FILM COATED ORAL at 11:29

## 2024-07-04 RX ADMIN — PANTOPRAZOLE SODIUM 40 MILLIGRAM(S): 40 INJECTION, POWDER, FOR SOLUTION INTRAVENOUS at 11:29

## 2024-07-04 RX ADMIN — Medication 1 TABLET(S): at 11:28

## 2024-07-04 RX ADMIN — Medication 500 MILLIGRAM(S): at 11:30

## 2024-07-04 RX ADMIN — Medication 63.75 MILLIMOLE(S): at 06:31

## 2024-07-04 RX ADMIN — POTASSIUM CHLORIDE 20 MILLIEQUIVALENT(S): 600 TABLET, FILM COATED, EXTENDED RELEASE ORAL at 03:39

## 2024-07-04 RX ADMIN — MIRTAZAPINE 15 MILLIGRAM(S): 15 TABLET, FILM COATED ORAL at 21:33

## 2024-07-04 RX ADMIN — Medication 1 APPLICATION(S): at 11:30

## 2024-07-04 RX ADMIN — ATORVASTATIN CALCIUM 40 MILLIGRAM(S): 20 TABLET, FILM COATED ORAL at 21:33

## 2024-07-04 RX ADMIN — CARVEDILOL PHOSPHATE 6.25 MILLIGRAM(S): 80 CAPSULE, EXTENDED RELEASE ORAL at 17:41

## 2024-07-04 RX ADMIN — LATANOPROST PF 1 DROP(S): 0.05 SOLUTION/ DROPS OPHTHALMIC at 22:10

## 2024-07-04 RX ADMIN — Medication 1 DROP(S): at 17:40

## 2024-07-04 RX ADMIN — LACTULOSE 10 GRAM(S): 10 SOLUTION ORAL at 12:54

## 2024-07-04 RX ADMIN — CARVEDILOL PHOSPHATE 6.25 MILLIGRAM(S): 80 CAPSULE, EXTENDED RELEASE ORAL at 05:43

## 2024-07-04 RX ADMIN — POLYETHYLENE GLYCOL 3350 17 GRAM(S): 1 POWDER ORAL at 11:29

## 2024-07-04 RX ADMIN — Medication 1 DROP(S): at 06:27

## 2024-07-04 NOTE — PATIENT PROFILE ADULT - DEAF OR HARD OF HEARING?
Proxy Access Patient Instructions    Dear Mai,    You have submitted your request under the wrong patient account. Please resubmit your request using the correct patient account.      If this visit is for a child, you will need proxy access to submit requests on their behalf. For access to a minor dependent's record, submit a request through your [employer] account. From the [employer] art, find your name in the upper right corner of the Home screen, tap the down arrow next to your name, select Add an Account and follow the prompts. From the [employer] website, click Menu to display the list of options. Scroll to the Sharing section and choose Request Family Access. Then choose Child Proxy and follow the prompts. Account access should be available within 5 business days.    To expedite this process, contact the [employer] Support Team at [phone] Monday to Friday 7 a.m. - 9 p.m. or Saturday and Sunday 8 a.m. - 4 p.m.    If you are experiencing a medical emergency, call 911 immediately. If you have any questions about your symptoms, contact us at [phone].    Thank You,    KATHY Munguia     no

## 2024-07-04 NOTE — H&P ADULT - PROBLEM SELECTOR PLAN 7
F-1 L restrict  E-cautious w/ ESRD  N-renal pureed diet  hold heparin subQ DVT prophylaxis, monitor for bleeding (is already on eliquis), if no further signs can do DVT prophylaxis

## 2024-07-04 NOTE — PROGRESS NOTE ADULT - PROBLEM SELECTOR PLAN 1
-TME likely 2/2 UTI  -given gliosis, biting of lower tongue ordered vEEG r/o seizure like activity  -neuro checks ordered  -ativan 1 mg prn seizure >2 minutes  -CT head w/ old gliosis, no acute ICH, masses, or strokes. Neuro exam UE 4/5 and a/o times 1-2 currently  -POC glucose checks  -f/u blood cultures (blood, urine, MRSA swab sent and waiting for results): if febrile do vancomycin and zosyn (recent MRSA bacteremia)

## 2024-07-04 NOTE — PROGRESS NOTE ADULT - ASSESSMENT
69 year old M hx of ESRD on HD left AVF MWFr, CVA w/ gliosis, BKA/AKA functional quadriplegia, CAD, HTN, HLD, recent admission for MRSA bacteremia here w/ not responding at his extended care facility.

## 2024-07-04 NOTE — ED ADULT NURSE REASSESSMENT NOTE - NS ED NURSE REASSESS COMMENT FT1
Break RN: Pt is currently at dialysis. Report given to floor RN Malena. Dialysis staff made aware of pt's bed status.

## 2024-07-04 NOTE — PROGRESS NOTE ADULT - PROBLEM SELECTOR PLAN 7
F: 1 L restrict  E: cautious w/ ESRD  N: renal pureed diet  D: hold heparin subQ DVT prophylaxis, monitor for bleeding (is already on eliquis), if no further signs can do DVT prophylaxis

## 2024-07-04 NOTE — H&P ADULT - NSHPPHYSICALEXAM_GEN_ALL_CORE
PHYSICAL EXAM:  GENERAL: NAD, elderly appearing male  HEAD:  Atraumatic, Normocephalic  EYES: EOMI, PERRLA, conjunctiva and sclera clear  NECK: Supple, No JVD  CHEST/LUNG: Clear to auscultation bilaterally; No wheeze  HEART: Regular rate and rhythm; No murmurs, rubs, or gallops  ABDOMEN: Soft, Nontender, Nondistended; Bowel sounds present  EXTREMITIES:  BKA/AKA, No clubbing, cyanosis, or edema  PSYCH: AAOx1 to self and that he is in the hospital   NEUROLOGY: non-focal  SKIN: AV fistula intact, no erythema or warmth

## 2024-07-04 NOTE — PROGRESS NOTE ADULT - SUBJECTIVE AND OBJECTIVE BOX
JAMES PATRICK  69y  Patient is a 69y old  Male who presents with a chief complaint of AMS (04 Jul 2024 07:21)    HPI:  Seen and examined. ESRD on HD, admitted for Altered Consciousness.  HD last night. Today, he is much more alert.    HEALTH ISSUES - PROBLEM Dx:  Toxic metabolic encephalopathy    H/O: CVA (cerebrovascular accident)    ESRD on dialysis    Dementia    Acute urinary tract infection    Elevated troponin    Nutrition, metabolism, and development symptoms          MEDICATIONS  (STANDING):  ascorbic acid 500 milliGRAM(s) Oral daily  aspirin enteric coated 81 milliGRAM(s) Oral daily  atorvastatin 40 milliGRAM(s) Oral at bedtime  carvedilol 6.25 milliGRAM(s) Oral every 12 hours  cefTRIAXone   IVPB 1000 milliGRAM(s) IV Intermittent every 24 hours  chlorhexidine 2% Cloths 1 Application(s) Topical daily  dextrose 50% Injectable 25 Gram(s) IV Push once  dextrose 50% Injectable 25 Gram(s) IV Push once  dextrose 50% Injectable 12.5 Gram(s) IV Push once  dextrose Oral Gel 15 Gram(s) Oral once  glucagon  Injectable 1 milliGRAM(s) IntraMuscular once  lactulose Syrup 10 Gram(s) Oral daily  latanoprost 0.005% Ophthalmic Solution 1 Drop(s) Both EYES at bedtime  mirtazapine 15 milliGRAM(s) Oral at bedtime  multivitamin 1 Tablet(s) Oral daily  pantoprazole   Suspension 40 milliGRAM(s) Oral daily  polyethylene glycol 3350 17 Gram(s) Oral daily  prednisoLONE acetate 1% Suspension 1 Drop(s) Both EYES every 12 hours  senna 2 Tablet(s) Oral at bedtime    MEDICATIONS  (PRN):  acetaminophen     Tablet .. 650 milliGRAM(s) Oral every 6 hours PRN Temp greater or equal to 38C (100.4F), Mild Pain (1 - 3)  LORazepam   Injectable 1 milliGRAM(s) IV Push every 8 hours PRN seizure >2 minutes  midodrine. 7.5 milliGRAM(s) Oral <User Schedule> PRN PRIOR TO HEMODIALYSIS  ondansetron Injectable 4 milliGRAM(s) IV Push every 8 hours PRN Nausea and/or Vomiting  sodium chloride 0.9% Bolus. 100 milliLiter(s) IV Bolus every 5 minutes PRN SBP LESS THAN or EQUAL to 90 mmHg    Vital Signs Last 24 Hrs  T(C): 36.9 (04 Jul 2024 05:32), Max: 37.3 (03 Jul 2024 17:45)  T(F): 98.4 (04 Jul 2024 05:32), Max: 99.1 (03 Jul 2024 17:45)  HR: 66 (04 Jul 2024 05:32) (63 - 74)  BP: 134/54 (04 Jul 2024 05:32) (123/61 - 138/69)  BP(mean): --  RR: 17 (04 Jul 2024 05:32) (14 - 18)  SpO2: 100% (04 Jul 2024 05:32) (87% - 100%)    Parameters below as of 04 Jul 2024 05:32  Patient On (Oxygen Delivery Method): nasal cannula      Daily     Daily     PHYSICAL EXAM:  Constitutional: He appears comfortable and not distressed. Not diaphoretic.    Neck:  The thyroid is normal. Trachea is midline.     Breasts: Normal examination.    Respiratory: The lungs are clear to auscultation. No dullness and expansion is normal.    Cardiovascular: S1 and S2 are normal. No mummurs, rubs or gallops are present.    Gastrointestinal: The abdomen is soft. No tenderness is present. No masses are present. Bowel sounds are normal.    Genitourinary: The bladder is not distended. No CVA tenderness is present.    Extremities: No edema is noted. Left AKA, right BKA.    Neurological: Awake and alert. Tone, power and sensation are normal.     Skin: No lesions are seen  or palpated.    Lymph Nodes: No lymphadenopathy is present.                                10.5   4.62  )-----------( 206      ( 04 Jul 2024 01:18 )             33.6     07-04    133<L>  |  80<L>  |  22  ----------------------------<  701<HH>  3.6   |  22  |  2.69<H>    Ca    7.2<L>      04 Jul 2024 07:05  Phos  34.2     07-04  Mg     2.10     07-04    TPro  8.5<H>  /  Alb  3.5  /  TBili  0.3  /  DBili  x   /  AST  20  /  ALT  10  /  AlkPhos  106  07-04

## 2024-07-04 NOTE — H&P ADULT - PROBLEM SELECTOR PLAN 1
-TME likely 2/2 UTI  -given gliosis, biting of lower tongue would get vEEG r/o seizure like activity  -neuro checks ordered  -ativan 1 mg prn seizure >2 minutes  -CT head w/ old gliosis, no acute ICH, masses, or strokes. Neuro exam UE 4/5 and a/o times 1-2 currently  -POC glucose checks  -f/u blood cultures, if febrile do vancomycin and zosyn (recent MRSA bacteremia)

## 2024-07-04 NOTE — H&P ADULT - HISTORY OF PRESENT ILLNESS
69 year old M hx of ESRD on HD left AVF MWFr, CVA w/ gliosis, BKA/AKA fucntional quadriplegia, CAD, HTN, HLD, recent admission for MRSA bacteremia here w/ not responding at his extended care facility. He had small amount of blood in his mouth as well, small bite on lower lip. He had straight cath done in ED, then became responsive again. Urinalysis was positive for UTI, given ceftriaxone 1 gram. CT head showed chronic gliosis. EKG showed nonspecific TWI, had troponin elevation, though ckmb and ck was wnl. Denies chest pain. He is a/o times 1-2 (knows name, and he is in hospital), denying any complaints. Nephrology was made aware, getting HD.    Vital Signs Last 24 Hrs  T(C): 36.6 (03 Jul 2024 23:30), Max: 37.3 (03 Jul 2024 17:45)  T(F): 97.8 (03 Jul 2024 23:30), Max: 99.1 (03 Jul 2024 17:45)  HR: 70 (03 Jul 2024 23:30) (65 - 74)  BP: 138/69 (03 Jul 2024 23:30) (123/61 - 138/69)  BP(mean): --  RR: 18 (03 Jul 2024 23:30) (14 - 18)  SpO2: 100% (03 Jul 2024 19:33) (87% - 100%)    Parameters below as of 03 Jul 2024 23:30  Patient On (Oxygen Delivery Method): room air

## 2024-07-04 NOTE — PATIENT PROFILE ADULT - HOME ACCESSIBILITY CONCERNS
----- Message from Lucy Ledesma sent at 1/8/2018  7:39 AM CST -----  Contact: MORAIMA LUCAS [[id number]]  _x  1st Request  _  2nd Request  _  3rd Request        Who: MORAIMA LUCAS [[id number]]    Why: patient states she is returning a call in regards to test results     What Number to Call Back: [phone]    When to Expect a call back: (Before the end of the day)   -- if call after 3:00 call back will be tomorrow.     none

## 2024-07-04 NOTE — H&P ADULT - ASSESSMENT
69 year old M hx of ESRD on HD left AVF MWFr, CVA w/ gliosis, BKA/AKA fucntional quadriplegia, CAD, HTN, HLD, recent admission for MRSA bacteremia here w/ not responding at his extended care facility.

## 2024-07-04 NOTE — PROGRESS NOTE ADULT - ASSESSMENT
ESRD:  On HD TIW via A-V Fistula.  Schedule is MWF. Consent obtained and charted.  - HD on MWF schedule  - Renal diet.    Unresponsiveness:  Improved post HD.  - Follow up clinically.      Hyperkalemia:  Specimen wad hemolyzed.  EKG not consistent with Hyperkalemia.  - Follow up BMP.

## 2024-07-04 NOTE — PATIENT PROFILE ADULT - ARRIVAL FROM
Goal Outcome Evaluation:  Plan of Care Reviewed With: patient           VFSS completed. Pt had silent penetration w/ trace aspiration x1 of thins. Chin tuck reduced this. Recommend regular and thin via single cup sip with chin tuck; upright for meals and 30 min after; slow rate; small bites; double swallow. Outpatient ST was recommended, but pt declined.   Anticipated Discharge Disposition (SLP): home   Home

## 2024-07-04 NOTE — H&P ADULT - PROBLEM SELECTOR PLAN 3
-troponin elevation likely 2/2 UTI  -CKMB and CK wnl, though TWI can obtain TTE  -c/w aspirin 81 mg and lipitor 40 mg   -c/w carvedilol 6.25 mg BID  -low concern for ACS given ckmb and ck levels

## 2024-07-04 NOTE — H&P ADULT - NSHPLABSRESULTS_GEN_ALL_CORE
LABS:                         10.7   6.27  )-----------( 192      ( 2024 18:49 )             35.0     07-03    141  |  99  |  53<H>  ----------------------------<  121<H>  4.2   |  28  |  5.49<H>    Ca    8.9      2024 21:50  Phos  2.2     07-03  Mg     3.10     07-03    TPro  7.4  /  Alb  3.1<L>  /  TBili  0.3  /  DBili  x   /  AST  20  /  ALT  8   /  AlkPhos  101  07-03    PT/INR - ( 2024 18:30 )   PT: 11.4 sec;   INR: 1.01 ratio         PTT - ( 2024 18:30 )  PTT:30.4 sec  Urinalysis Basic - ( 2024 21:50 )    Color: Dark Yellow / Appearance: Turbid / S.020 / pH: x  Gluc: 121 mg/dL / Ketone: Negative mg/dL  / Bili: Negative / Urobili: 0.2 mg/dL   Blood: x / Protein: 300 mg/dL / Nitrite: Negative   Leuk Esterase: Moderate / RBC: 38 /HPF / WBC 43 /HPF   Sq Epi: x / Non Sq Epi: 0 /HPF / Bacteria: Many /HPF      CARDIAC MARKERS ( 2024 21:50 )  x     / x     / 47 U/L / x     / 1.3 ng/mL      < from: CT Head No Cont (24 @ 18:28) >    IMPRESSION:   Encephalomalacia and gliosis in the BILATERAL occipital   lobes and cerebellum secondary to old infarctions. Moderate   periventricular white matter ischemia is noted.    < end of copied text >

## 2024-07-04 NOTE — PROGRESS NOTE ADULT - SUBJECTIVE AND OBJECTIVE BOX
The patient was seen an examined at bedside.   INTERVAL HPI/OVERNIGHT EVENTS:  Pt seen and examined at bedside. No acute overnight events or complaints.    VITAL SIGNS:  T(F): 98.4 (07-04-24 @ 05:32)  HR: 66 (07-04-24 @ 05:32)  BP: 134/54 (07-04-24 @ 05:32)  RR: 17 (07-04-24 @ 05:32)  SpO2: 100% (07-04-24 @ 05:32)  Wt(kg): --    PHYSICAL EXAM:    Constitutional: WDWN, NAD  HEENT: PERRL, EOMI, sclera non-icteric, neck supple, trachea midline, no masses, no JVD, MMM, good dentition  Respiratory: CTA b/l, good air entry b/l, no wheezing, no rhonchi, no rales, without accessory muscle use and no intercostal retractions  Cardiovascular: RRR, normal S1S2, no M/R/G  Gastrointestinal: soft, NTND, no masses palpable, BS normal  Extremities: Warm, well perfused, pulses equal bilateral upper and lower extremities, no edema, no clubbing. Capillary refill <2 sec  Neurological: AAOx3, CN Grossly intact  Skin: Normal temperature, warm, dry    MEDICATIONS  (STANDING):  ascorbic acid 500 milliGRAM(s) Oral daily  aspirin enteric coated 81 milliGRAM(s) Oral daily  atorvastatin 40 milliGRAM(s) Oral at bedtime  carvedilol 6.25 milliGRAM(s) Oral every 12 hours  cefTRIAXone   IVPB 1000 milliGRAM(s) IV Intermittent every 24 hours  chlorhexidine 2% Cloths 1 Application(s) Topical daily  dextrose 50% Injectable 12.5 Gram(s) IV Push once  dextrose 50% Injectable 25 Gram(s) IV Push once  dextrose 50% Injectable 25 Gram(s) IV Push once  dextrose Oral Gel 15 Gram(s) Oral once  glucagon  Injectable 1 milliGRAM(s) IntraMuscular once  lactulose Syrup 10 Gram(s) Oral daily  latanoprost 0.005% Ophthalmic Solution 1 Drop(s) Both EYES at bedtime  mirtazapine 15 milliGRAM(s) Oral at bedtime  multivitamin 1 Tablet(s) Oral daily  pantoprazole   Suspension 40 milliGRAM(s) Oral daily  polyethylene glycol 3350 17 Gram(s) Oral daily  prednisoLONE acetate 1% Suspension 1 Drop(s) Both EYES every 12 hours  senna 2 Tablet(s) Oral at bedtime    MEDICATIONS  (PRN):  acetaminophen     Tablet .. 650 milliGRAM(s) Oral every 6 hours PRN Temp greater or equal to 38C (100.4F), Mild Pain (1 - 3)  LORazepam   Injectable 1 milliGRAM(s) IV Push every 8 hours PRN seizure >2 minutes  midodrine. 7.5 milliGRAM(s) Oral <User Schedule> PRN PRIOR TO HEMODIALYSIS  ondansetron Injectable 4 milliGRAM(s) IV Push every 8 hours PRN Nausea and/or Vomiting  sodium chloride 0.9% Bolus. 100 milliLiter(s) IV Bolus every 5 minutes PRN SBP LESS THAN or EQUAL to 90 mmHg      Allergies    No Known Allergies    Intolerances        LABS:                        10.5   4.62  )-----------( 206      ( 04 Jul 2024 01:18 )             33.6     07-04    140  |  98  |  16  ----------------------------<  101<H>  2.7<LL>   |  28  |  1.86<H>    Ca    9.1      04 Jul 2024 01:18  Phos  0.8     07-04  Mg     2.20     07-04    TPro  8.5<H>  /  Alb  3.5  /  TBili  0.3  /  DBili  x   /  AST  20  /  ALT  10  /  AlkPhos  106  07-04    PT/INR - ( 03 Jul 2024 18:30 )   PT: 11.4 sec;   INR: 1.01 ratio         PTT - ( 03 Jul 2024 18:30 )  PTT:30.4 sec  Urinalysis Basic - ( 04 Jul 2024 01:18 )    Color: x / Appearance: x / SG: x / pH: x  Gluc: 101 mg/dL / Ketone: x  / Bili: x / Urobili: x   Blood: x / Protein: x / Nitrite: x   Leuk Esterase: x / RBC: x / WBC x   Sq Epi: x / Non Sq Epi: x / Bacteria: x        RADIOLOGY & ADDITIONAL TESTS:  Reviewed      ******************  Authored By: Elle Pool MD, PGY1  MS Teams Preferred  ******************   INTERVAL HPI/OVERNIGHT EVENTS:  Pt seen and examined at bedside. No acute overnight events or complaints. This morning the patient was alert and laying comfortably in bed. He reported having a headache and feeling warm.    VITAL SIGNS:  T(F): 98.4 (07-04-24 @ 05:32)  HR: 66 (07-04-24 @ 05:32)  BP: 134/54 (07-04-24 @ 05:32)  RR: 17 (07-04-24 @ 05:32)  SpO2: 100% (07-04-24 @ 05:32)  Wt(kg): --    PHYSICAL EXAM:    Constitutional: WDWN, NAD  HEENT: PERRL, EOMI, sclera non-icteric, neck supple, trachea midline, no masses, no JVD, MMM, good dentition  Respiratory: CTA b/l, good air entry b/l, no wheezing, no rhonchi, no rales, without accessory muscle use and no intercostal retractions  Cardiovascular: RRR, normal S1S2, no M/R/G  Gastrointestinal: soft, NTND, no masses palpable, BS normal  Extremities: Warm, well perfused, pulses equal bilateral upper and lower extremities, no edema, no clubbing. Capillary refill <2 sec  Neurological: AAOx3, CN Grossly intact  Skin: Normal temperature, warm, dry  Renal: left upper arm fistula pulse and flow intact    MEDICATIONS  (STANDING):  ascorbic acid 500 milliGRAM(s) Oral daily  aspirin enteric coated 81 milliGRAM(s) Oral daily  atorvastatin 40 milliGRAM(s) Oral at bedtime  carvedilol 6.25 milliGRAM(s) Oral every 12 hours  cefTRIAXone   IVPB 1000 milliGRAM(s) IV Intermittent every 24 hours  chlorhexidine 2% Cloths 1 Application(s) Topical daily  dextrose 50% Injectable 12.5 Gram(s) IV Push once  dextrose 50% Injectable 25 Gram(s) IV Push once  dextrose 50% Injectable 25 Gram(s) IV Push once  dextrose Oral Gel 15 Gram(s) Oral once  glucagon  Injectable 1 milliGRAM(s) IntraMuscular once  lactulose Syrup 10 Gram(s) Oral daily  latanoprost 0.005% Ophthalmic Solution 1 Drop(s) Both EYES at bedtime  mirtazapine 15 milliGRAM(s) Oral at bedtime  multivitamin 1 Tablet(s) Oral daily  pantoprazole   Suspension 40 milliGRAM(s) Oral daily  polyethylene glycol 3350 17 Gram(s) Oral daily  prednisoLONE acetate 1% Suspension 1 Drop(s) Both EYES every 12 hours  senna 2 Tablet(s) Oral at bedtime    MEDICATIONS  (PRN):  acetaminophen     Tablet .. 650 milliGRAM(s) Oral every 6 hours PRN Temp greater or equal to 38C (100.4F), Mild Pain (1 - 3)  LORazepam   Injectable 1 milliGRAM(s) IV Push every 8 hours PRN seizure >2 minutes  midodrine. 7.5 milliGRAM(s) Oral <User Schedule> PRN PRIOR TO HEMODIALYSIS  ondansetron Injectable 4 milliGRAM(s) IV Push every 8 hours PRN Nausea and/or Vomiting  sodium chloride 0.9% Bolus. 100 milliLiter(s) IV Bolus every 5 minutes PRN SBP LESS THAN or EQUAL to 90 mmHg      Allergies    No Known Allergies    Intolerances        LABS:                        10.5   4.62  )-----------( 206      ( 04 Jul 2024 01:18 )             33.6     07-04    140  |  98  |  16  ----------------------------<  101<H>  2.7<LL>   |  28  |  1.86<H>    Ca    9.1      04 Jul 2024 01:18  Phos  0.8     07-04  Mg     2.20     07-04    TPro  8.5<H>  /  Alb  3.5  /  TBili  0.3  /  DBili  x   /  AST  20  /  ALT  10  /  AlkPhos  106  07-04    PT/INR - ( 03 Jul 2024 18:30 )   PT: 11.4 sec;   INR: 1.01 ratio         PTT - ( 03 Jul 2024 18:30 )  PTT:30.4 sec  Urinalysis Basic - ( 04 Jul 2024 01:18 )    Color: x / Appearance: x / SG: x / pH: x  Gluc: 101 mg/dL / Ketone: x  / Bili: x / Urobili: x   Blood: x / Protein: x / Nitrite: x   Leuk Esterase: x / RBC: x / WBC x   Sq Epi: x / Non Sq Epi: x / Bacteria: x        RADIOLOGY & ADDITIONAL TESTS:  Reviewed      ******************  Authored By: Elle Pool MD, PGY1  MS Teams Preferred  ******************

## 2024-07-04 NOTE — PROGRESS NOTE ADULT - ATTENDING COMMENTS
69 year old M hx of ESRD on HD left AVF MWFr, CVA w/ gliosis, BKA/AKA functional quadriplegia, CAD, HTN, HLD, recent admission for MRSA bacteremia here w/ not responding at his extended care facility. Pt admitted with Metabolic encephalopathy likely secondary to UTI , will c/w Ceftraixone, f/u cx  ESRD on HD per Renal  Rest of the management as above

## 2024-07-05 ENCOUNTER — RESULT REVIEW (OUTPATIENT)
Age: 69
End: 2024-07-05

## 2024-07-05 LAB
ANION GAP SERPL CALC-SCNC: 12 MMOL/L — SIGNIFICANT CHANGE UP (ref 7–14)
BUN SERPL-MCNC: 33 MG/DL — HIGH (ref 7–23)
CALCIUM SERPL-MCNC: 8.3 MG/DL — LOW (ref 8.4–10.5)
CHLORIDE SERPL-SCNC: 99 MMOL/L — SIGNIFICANT CHANGE UP (ref 98–107)
CO2 SERPL-SCNC: 28 MMOL/L — SIGNIFICANT CHANGE UP (ref 22–31)
CREAT SERPL-MCNC: 4.48 MG/DL — HIGH (ref 0.5–1.3)
EGFR: 13 ML/MIN/1.73M2 — LOW
GLUCOSE BLDC GLUCOMTR-MCNC: 71 MG/DL — SIGNIFICANT CHANGE UP (ref 70–99)
GLUCOSE SERPL-MCNC: 70 MG/DL — SIGNIFICANT CHANGE UP (ref 70–99)
HCT VFR BLD CALC: 32.4 % — LOW (ref 39–50)
HGB BLD-MCNC: 9.9 G/DL — LOW (ref 13–17)
MAGNESIUM SERPL-MCNC: 2.5 MG/DL — SIGNIFICANT CHANGE UP (ref 1.6–2.6)
MCHC RBC-ENTMCNC: 26.3 PG — LOW (ref 27–34)
MCHC RBC-ENTMCNC: 30.6 GM/DL — LOW (ref 32–36)
MCV RBC AUTO: 85.9 FL — SIGNIFICANT CHANGE UP (ref 80–100)
NRBC # BLD: 0 /100 WBCS — SIGNIFICANT CHANGE UP (ref 0–0)
NRBC # FLD: 0 K/UL — SIGNIFICANT CHANGE UP (ref 0–0)
PHOSPHATE SERPL-MCNC: 3.9 MG/DL — SIGNIFICANT CHANGE UP (ref 2.5–4.5)
PLATELET # BLD AUTO: 203 K/UL — SIGNIFICANT CHANGE UP (ref 150–400)
POTASSIUM SERPL-MCNC: 4.1 MMOL/L — SIGNIFICANT CHANGE UP (ref 3.5–5.3)
POTASSIUM SERPL-SCNC: 4.1 MMOL/L — SIGNIFICANT CHANGE UP (ref 3.5–5.3)
RBC # BLD: 3.77 M/UL — LOW (ref 4.2–5.8)
RBC # FLD: 19.7 % — HIGH (ref 10.3–14.5)
SODIUM SERPL-SCNC: 139 MMOL/L — SIGNIFICANT CHANGE UP (ref 135–145)
WBC # BLD: 3.96 K/UL — SIGNIFICANT CHANGE UP (ref 3.8–10.5)
WBC # FLD AUTO: 3.96 K/UL — SIGNIFICANT CHANGE UP (ref 3.8–10.5)

## 2024-07-05 PROCEDURE — 93306 TTE W/DOPPLER COMPLETE: CPT | Mod: 26

## 2024-07-05 PROCEDURE — 99232 SBSQ HOSP IP/OBS MODERATE 35: CPT | Mod: GC

## 2024-07-05 PROCEDURE — 93010 ELECTROCARDIOGRAM REPORT: CPT

## 2024-07-05 RX ORDER — DEXTROSE 30 % IN WATER 30 %
50 VIAL (ML) INTRAVENOUS ONCE
Refills: 0 | Status: COMPLETED | OUTPATIENT
Start: 2024-07-05 | End: 2024-07-05

## 2024-07-05 RX ORDER — ERYTHROPOIETIN 4000 [IU]/ML
6000 INJECTION, SOLUTION INTRAVENOUS; SUBCUTANEOUS
Refills: 0 | Status: DISCONTINUED | OUTPATIENT
Start: 2024-07-05 | End: 2024-07-09

## 2024-07-05 RX ADMIN — POLYETHYLENE GLYCOL 3350 17 GRAM(S): 1 POWDER ORAL at 13:22

## 2024-07-05 RX ADMIN — MIRTAZAPINE 15 MILLIGRAM(S): 15 TABLET, FILM COATED ORAL at 21:30

## 2024-07-05 RX ADMIN — Medication 50 MILLILITER(S): at 18:23

## 2024-07-05 RX ADMIN — Medication 1 APPLICATION(S): at 13:24

## 2024-07-05 RX ADMIN — LACTULOSE 10 GRAM(S): 10 SOLUTION ORAL at 13:22

## 2024-07-05 RX ADMIN — LATANOPROST PF 1 DROP(S): 0.05 SOLUTION/ DROPS OPHTHALMIC at 21:31

## 2024-07-05 RX ADMIN — Medication 500 MILLIGRAM(S): at 13:23

## 2024-07-05 RX ADMIN — Medication 1 DROP(S): at 18:22

## 2024-07-05 RX ADMIN — Medication 2 TABLET(S): at 21:30

## 2024-07-05 RX ADMIN — PANTOPRAZOLE SODIUM 40 MILLIGRAM(S): 40 INJECTION, POWDER, FOR SOLUTION INTRAVENOUS at 13:22

## 2024-07-05 RX ADMIN — CARVEDILOL PHOSPHATE 6.25 MILLIGRAM(S): 80 CAPSULE, EXTENDED RELEASE ORAL at 18:22

## 2024-07-05 RX ADMIN — ASPIRIN 81 MILLIGRAM(S): 325 TABLET, FILM COATED ORAL at 13:23

## 2024-07-05 RX ADMIN — Medication 1 TABLET(S): at 13:23

## 2024-07-05 RX ADMIN — Medication 1 DROP(S): at 06:29

## 2024-07-05 RX ADMIN — ATORVASTATIN CALCIUM 40 MILLIGRAM(S): 20 TABLET, FILM COATED ORAL at 21:30

## 2024-07-05 NOTE — DIETITIAN INITIAL EVALUATION ADULT - PROBLEM SELECTOR PROBLEM 5
ESRD on dialysis PHYSICAL THERAPY DAILY PROGRESS NOTE    Referring Provider:  Dr. Michele Pham    Diagnosis:       ICD-10-CM ICD-9-CM    1. Left ankle pain, unspecified chronicity M25.572 719.47    2. History of open reduction and internal fixation (ORIF) procedure Z98.890 V15.29             Orders:  Evaluate and Treat    Date of Initial Evaluation: 10/12/17    Orders : 17    Visit # 12 of 30     SUBJECTIVE:  The patient states she was walking on [facility] yesterday and she twisted her ankle.  She said it was swollen and she went back to her dorm to ice it and take medication.  Today she has no swelling and she is feeling only slight pain across the top of the ankle.  She doesn't recall any swelling after last session.            Initial:  Patient and her mother were present for the evaluation.  She states she was dancing during a contemporary class when she fell while performing a turn and landed wrong causing the fracture.  This occurred on 2017.  She had surgery on 2017 with a splint put on after surgery.  She was then put in a walking boot at her 2 week follow-up; however, she was still NWB until her appointment on 17.  She started school at [facility] during this time with use of crutches and a leg scooter to maintain her WB status.  She states now that she is walking without any brace as of last Thursday but she gets pain while walking to class.  She states when she walks at a moderate pace its 3-4/10 and at a faster pace its 5-6/10.  She wants to get back to dancing; however, at this time it would be more on a leisure basis vs the intense level she was performing over the summer.  She [place] on campus in a dorm on the second floor and states she is able to go up and down the stairs with a reciprocal pattern.  She hasn't taken any pain medication in approximately one month.  Her main goal is to be able to walk comfortably and stand on one leg with progression into dancing over time.     Surgery  performed: August 7, 2017 - Open reduction and internal fixation of left distal fibula      PAST MEDICAL HISTORY:    Past Medical History:   Diagnosis Date    Acne     Amblyopia wears glasses    Anxiety     Vision abnormalities        CURRENT MEDICATIONS:    Current Outpatient Prescriptions:     aspirin 325 MG tablet, Take 1 tablet (325 mg total) by mouth 2 (two) times daily., Disp: 42 tablet, Rfl: 0    azithromycin (Z-REED) 250 MG tablet, Take 2 tablets by mouth on day 1; Take 1 tablet by mouth on days 2-5, Disp: 6 tablet, Rfl: 0    neomycin-polymyxin-hydrocortisone (CORTISPORIN) 3.5-10,000-1 mg/mL-unit/mL-% otic suspension, Place 3 drops into the left ear 3 (three) times daily., Disp: 10 mL, Rfl: 0    oxycodone-acetaminophen (PERCOCET)  mg per tablet, Take 1 tablet by mouth every 6 (six) hours as needed for Pain., Disp: 60 tablet, Rfl: 0    promethazine (PHENERGAN) 25 MG tablet, Take 1 tablet (25 mg total) by mouth every 6 (six) hours as needed for Nausea., Disp: 40 tablet, Rfl: 0    OBJECTIVE:  Pain: 3-6 /10, located L ankle, described as discomfort    Sensation: intact to light touch     Ankle ROM:  Plantar Flexion   Left 50degrees   Right 70 degrees      Dorsi Flexion   Left -5degrees   Right 0 degrees     Inversion   Left 20 degrees  Right 30 degrees      Eversion   Left 20 degrees Right 20 degrees  Limited inversion     Strength:  Dorsiflexion    Left 4  Right 5-      Plantarflexion   Left 4  Right 5-     Inversion    Left 4  Right 5-     Eversion   Left 4  Right 5-     Gluteus Richar  Left 4-  Right 4-     Gluteus Medius   Left 5-  Right 5-     Iliopsoas   Left 4-  Right 4-      Hamstring   Left 4+  Right 4+     Quadriceps   Left 5-  Right 5-             Function:  LOWER EXTREMITY FUNCTIONAL SCALE                EVAL  10th visit  1. Any of your usual work, housework or school activities   2/4 4/4  2. Your usual hobbies, sporting     0/4 2/4  3. Getting in and out of tub      4/4 4/4  4.  Walking between rooms      4/4 4/4  5. Putting on shoes or socks      4/4 4/4  6. Squatting        2/4 4/4  7. Lifting an object from the ground      4/4 4/4  8. Performing light activities around the home   4/4 4/4  9. Performing heavy activities around the home   4/4 4/4  10. Getting in and out of car      4/4 4/4  11. Walking 2 blocks       3/4  4/4  12.Walking a mile       2/4 4/4  13. Getting up and down 1 flight of stairs    4/4 4/4  14. Standing for 1 hour      4/4 4/4  15. Sitting for an hour       4/4 4/4  16. Running on even ground      1/4  3/4  17. Running on uneven ground     0/4  3/4  18. Making sharp turns when running fast    2/4  3/4  19. Hopping        2/4  3/4  20. Rolling over in bed       4/4 4/4          Total: 58/80  74/80    Patient reports 7.5% disability based on score of the Lower Extremity Functional Scale. (27.5% on eval)    Tenderness to palpation:  Along the borders of the incision    Girth:  Malleoli:  Left 25 cm   Right 24.5 cm    Figure 8:  Left 54.5 cm   Right 54.5 cm     Other: gait: antalgic with decreased DF on the L LE    ASSESSMENT:  Muscle testing revealed no strength deficits in her ankle and no acute tenderness.  She had some soreness in the medial ankle with lunges; however, once she put her shoe on this was better.           Initial:  The patient is a 17 y.o. year old female who presents to physical therapy with complaints of L ankle pain status post ORIF for L distal fibula fracture.  Patient's impairments include decreased L ankle ROM, decreased L ankle strength, L ankle swelling, and decreased B hip strength.  Balance on the L LE is currently poor.  These impairments are limiting patient's ability to walk at normal and fast paces without pain.  Patient will benefit from skilled physical therapy intervention to address the above listed deficits and below listed goals to increase patient's functional mobility and stability.    Short Term Goals:    1.Patient  "will be educated on HEP and report compliance at least 2x per week.  2.Patient will decrease L LE swelling to = that of the R LE.   3.Patient will increase strength of B LE by 1/3 MMT grade to increase functional stability.       Long Term Goals:  1.Patient will increase strength of B LE by 1/2 MMT grade to increase functional stability.   2.Patient will demonstrate increased ROM of the L LE in inversion and DF to WNL.  3.Patient will decrease LEFS disability to less 15% to evidence increased functional mobility.     Co-morbidities which may impact the plan of care and potentially impede the patient's progress in therapy include: History of knee pain and hip pain.    The patient's clinical presentation is stable.    TREATMENT PROVIDED:    -Manual Therapy:  (deferred today) PROM to the L ankle into all ranges with strumming across the dorsum of the foot at the talus.      -Therapeutic Exercise:  (30 minutes)  L GSS on slant board, 3x30" on level 2  Soleus stretch on slant board, 3x30" on level 2  Rebounder 3-way, SLS , 4#, 30x - no shoes, on foam  Alt heel walk & toe walks, 50' x 4 each   BAPS board, 1:00 each CW/CCW, L3 2#, at AM & PM, 3# at PL & AL  Marching on mini-tramp with SLS, 3x45"  Lunges at parallel bars with red band at anterior knee to increase ER, 2x10 B LE   MMT to L ankle with no change since last measure      Deferred today:  Elliptical, 5 minutes, level 20  Ladder activities, 8 minutes  Jumps off mini-tramp, 10 reps  L SLS 3x30" with shoes off   SLR/hip and in S/L/prone hip ext, B LE 1x10 each   Leg press, 3:00 each with ball and blue band at knees  Ankle DF/inv/ever, 2x10 each, red band  L SLS no shoes, on foam, 3x30"  Tandem gait on foam, 2 lengths  Tandem toe walk on foam, 6 lengths  B heel raise with L eccentric lowering, 2x10, on foam  L single leg heel raises, 2x10, no shoes, on foam  S/L on theraball, B hip abd/ext/CCW/CW, 1x10 B LE       -Ultrasound: (8 minutes)  20%, 1.2 w/cm2, 3.3 mHz to " the L ankle along the dorsum of the foot at the talus.      -Electrical stimulation: (10 minutes)  Applied to the L ankle with cold pack to decrease swelling and promote healing.      Initial eval:  Educated on performance of ABC's with slow deliberate motions to decrease swelling and increase ROM and strength.       PLAN:  Patient will benefit from physical therapy (2) x/week for (8) weeks including manual therapy, therapeutic exercise, functional activities, modalities, and patient education.    Thank you for this referral.    These services are reasonable and necessary for the conditions set forth above while under my care.

## 2024-07-05 NOTE — PROGRESS NOTE ADULT - ATTENDING COMMENTS
69 year old M hx of ESRD on HD left AVF MWFr, CVA w/ gliosis, BKA/AKA functional quadriplegia, CAD, HTN, HLD, recent admission for MRSA bacteremia admitted for non responsiveness at facility. Now improved. S/p CTH notable for encephalomalacia and gliosis in the b/l occipital   lobes and cerebellum secondary to old infarctions and oderate periventricular white matter ischemia is noted. Also noted to have elevated Hst.     -Encephalopathy thought to be 2/2 UTI. However, pt noted to have recovery of MS after straight cath in the ED and urine cx neg (obtained prior to abx) which does not fit picture for UTI. Furthermore he has been afebrile and w/o leukocytosis. 7/3 blood cx NTD. Would dc ceftriaxone and monitor off abx.   -Noted elevated troponin levels and TWI on EKG. However CKMB and CK wnl and pt w/ ESRD. Low concern for ACS. C/w aspirin 81 mg , lipitor 40 mg and carvedilol 6.25 mg BID. Repeat EKG. Will obtain TTE   -Given gliosis, biting of lower tongue ordered vEEG. Too late to obtain prolactin level at this time. Neuro checks. Pt currently with no further episodes. Given no identifiable source of pts symptoms. Would also consult neuro during admission to assess if any further w/u would be needed inpatient vs outpt.

## 2024-07-05 NOTE — PROGRESS NOTE ADULT - PROBLEM SELECTOR PLAN 1
·  Plan: -TME likely 2/2 UTI  -given gliosis, biting of lower tongue ordered vEEG r/o seizure like activity  -neuro checks ordered  -ativan 1 mg prn seizure >2 minutes  -CT head w/ old gliosis, no acute ICH, masses, or strokes. Neuro exam UE 4/5 and a/o times 1-2 currently  -POC glucose checks  -f/u blood cultures (blood, urine, MRSA swab sent and waiting for results): if febrile do vancomycin and zosyn (recent MRSA bacteremia).

## 2024-07-05 NOTE — PROGRESS NOTE ADULT - PROBLEM SELECTOR PLAN 7
Plan: F:  Fluid: 1 L restrict  E: cautious w/ ESRD  N: renal pureed diet (nutrition rec adding Nepro 1x/day and dysphagia screen-ordered)  D: hold heparin subQ DVT prophylaxis, monitor for bleeding (is already on eliquis), if no further signs can do DVT prophylaxis.

## 2024-07-05 NOTE — PROGRESS NOTE ADULT - SUBJECTIVE AND OBJECTIVE BOX
[facility]   MD MARIBELL CARRION MD ANGELA WONG, PA    TEL:  OFFICE: [phone]  From 5pm-7am Answering Service [phone]    -- RENAL FOLLOW UP NOTE ---Date of Service 07-05-24 @ 09:53    Patient is a 69y old  Male who presents with a chief complaint of Altered mental status     (05 Jul 2024 09:35)      Patient seen and examined at bedside. No chest pain/sob    VITALS:  T(F): 97.7 (07-05-24 @ 07:00), Max: 98.1 (07-04-24 @ 17:15)  HR: 68 (07-05-24 @ 07:00)  BP: 134/67 (07-05-24 @ 07:00)  RR: 17 (07-05-24 @ 07:00)  SpO2: 100% (07-05-24 @ 05:30)  Wt(kg): --        PHYSICAL EXAM:  General: NAD  Neck: No JVD  Respiratory: CTAB, no wheezes, rales or rhonchi  Cardiovascular: S1, S2, RRR  Gastrointestinal: BS+, soft, NT/ND  Extremities: b/l amputation     Hospital Medications:   MEDICATIONS  (STANDING):  ascorbic acid 500 milliGRAM(s) Oral daily  aspirin enteric coated 81 milliGRAM(s) Oral daily  atorvastatin 40 milliGRAM(s) Oral at bedtime  carvedilol 6.25 milliGRAM(s) Oral every 12 hours  cefTRIAXone   IVPB 1000 milliGRAM(s) IV Intermittent every 24 hours  chlorhexidine 2% Cloths 1 Application(s) Topical daily  dextrose 50% Injectable 25 Gram(s) IV Push once  dextrose 50% Injectable 25 Gram(s) IV Push once  dextrose 50% Injectable 12.5 Gram(s) IV Push once  dextrose Oral Gel 15 Gram(s) Oral once  glucagon  Injectable 1 milliGRAM(s) IntraMuscular once  lactulose Syrup 10 Gram(s) Oral daily  latanoprost 0.005% Ophthalmic Solution 1 Drop(s) Both EYES at bedtime  mirtazapine 15 milliGRAM(s) Oral at bedtime  multivitamin 1 Tablet(s) Oral daily  pantoprazole   Suspension 40 milliGRAM(s) Oral daily  polyethylene glycol 3350 17 Gram(s) Oral daily  prednisoLONE acetate 1% Suspension 1 Drop(s) Both EYES every 12 hours  senna 2 Tablet(s) Oral at bedtime      LABS:  07-05    139  |  99  |  33<H>  ----------------------------<  70  4.1   |  28  |  4.48<H>    Ca    8.3<L>      05 Jul 2024 06:55  Phos  3.9     07-05  Mg     2.50     07-05    TPro  8.5<H>  /  Alb  3.5  /  TBili  0.3  /  DBili      /  AST  20  /  ALT  10  /  AlkPhos  106  07-04    Creatinine Trend: 4.48 <--, 3.38 <--, 2.69 <--, 1.86 <--, 5.49 <--, 5.15 <--    Phosphorus: 3.9 mg/dL (07-05 @ 06:55)                              9.9    3.96  )-----------( 203      ( 05 Jul 2024 06:55 )             32.4     Urine Studies:  Urinalysis - [07-05-24 @ 06:55]      Color  / Appearance  / SG  / pH       Gluc 70 / Ketone   / Bili  / Urobili        Blood  / Protein  / Leuk Est  / Nitrite       RBC  / WBC  / Hyaline  / Gran  / Sq Epi  / Non Sq Epi  / Bacteria       Iron 42, TIBC 127, %sat 33      [05-11-24 @ 07:26]  Ferritin 1680      [05-11-24 @ 07:26]  PTH -- (Ca --)      [04-28-24 @ 05:00]   155  HbA1c 4.2      [07-19-19 @ 09:56]  TSH 1.96      [05-07-24 @ 04:54]  Lipid: chol 119, TG 66, HDL 46, LDL --      [10-07-23 @ 09:30]    HBsAb 55.8      [07-03-24 @ 23:30]  HBsAg Nonreact      [07-03-24 @ 23:30]  HBcAb Nonreact      [07-03-24 @ 23:30]  HCV 0.11, Nonreact      [07-03-24 @ 23:30]      RADIOLOGY & ADDITIONAL STUDIES:

## 2024-07-05 NOTE — DIETITIAN INITIAL EVALUATION ADULT - PERTINENT LABORATORY DATA
07-05    139  |  99  |  33<H>  ----------------------------<  70  4.1   |  28  |  4.48<H>    Ca    8.3<L>      05 Jul 2024 06:55  Phos  3.9     07-05  Mg     2.50     07-05    TPro  8.5<H>  /  Alb  3.5  /  TBili  0.3  /  DBili  x   /  AST  20  /  ALT  10  /  AlkPhos  106  07-04  POCT Blood Glucose.: 71 mg/dL (07-05-24 @ 06:27)  A1C with Estimated Average Glucose Result: 4.8 % (05-02-24 @ 06:15)  A1C with Estimated Average Glucose Result: 4.7 % (10-07-23 @ 09:30)

## 2024-07-05 NOTE — DIETITIAN INITIAL EVALUATION ADULT - PERTINENT MEDS FT
MEDICATIONS  (STANDING):  ascorbic acid 500 milliGRAM(s) Oral daily  aspirin enteric coated 81 milliGRAM(s) Oral daily  atorvastatin 40 milliGRAM(s) Oral at bedtime  carvedilol 6.25 milliGRAM(s) Oral every 12 hours  cefTRIAXone   IVPB 1000 milliGRAM(s) IV Intermittent every 24 hours  chlorhexidine 2% Cloths 1 Application(s) Topical daily  dextrose 50% Injectable 25 Gram(s) IV Push once  dextrose 50% Injectable 25 Gram(s) IV Push once  dextrose 50% Injectable 12.5 Gram(s) IV Push once  dextrose Oral Gel 15 Gram(s) Oral once  glucagon  Injectable 1 milliGRAM(s) IntraMuscular once  lactulose Syrup 10 Gram(s) Oral daily  latanoprost 0.005% Ophthalmic Solution 1 Drop(s) Both EYES at bedtime  mirtazapine 15 milliGRAM(s) Oral at bedtime  multivitamin 1 Tablet(s) Oral daily  pantoprazole   Suspension 40 milliGRAM(s) Oral daily  polyethylene glycol 3350 17 Gram(s) Oral daily  prednisoLONE acetate 1% Suspension 1 Drop(s) Both EYES every 12 hours  senna 2 Tablet(s) Oral at bedtime    MEDICATIONS  (PRN):  acetaminophen     Tablet .. 650 milliGRAM(s) Oral every 6 hours PRN Temp greater or equal to 38C (100.4F), Mild Pain (1 - 3)  LORazepam   Injectable 1 milliGRAM(s) IV Push every 8 hours PRN seizure >2 minutes  midodrine. 7.5 milliGRAM(s) Oral <User Schedule> PRN PRIOR TO HEMODIALYSIS  ondansetron Injectable 4 milliGRAM(s) IV Push every 8 hours PRN Nausea and/or Vomiting  sodium chloride 0.9% Bolus. 100 milliLiter(s) IV Bolus every 5 minutes PRN SBP LESS THAN or EQUAL to 90 mmHg

## 2024-07-05 NOTE — PROGRESS NOTE ADULT - SUBJECTIVE AND OBJECTIVE BOX
INTERVAL HPI/OVERNIGHT EVENTS:  Pt seen and examined at bedside. No acute overnight events or complaints. Getting HD today. No fevers overnight and NGTD    VITAL SIGNS:  T(F): 97.7 (07-05-24 @ 07:00)  HR: 68 (07-05-24 @ 07:00)  BP: 134/67 (07-05-24 @ 07:00)  RR: 17 (07-05-24 @ 07:00)  SpO2: 100% (07-05-24 @ 05:30)  Wt(kg): --    PHYSICAL EXAM:    Constitutional: WDWN, NAD  HEENT: PERRL, EOMI, sclera non-icteric, neck supple, trachea midline, no masses, no JVD, MMM, good dentition  Respiratory: CTA b/l, good air entry b/l, no wheezing, no rhonchi, no rales, without accessory muscle use and no intercostal retractions  Cardiovascular: RRR, normal S1S2, no M/R/G  Gastrointestinal: soft, NTND, no masses palpable, BS normal  Extremities: Warm, well perfused, pulses equal bilateral upper and lower extremities, no edema, no clubbing. Capillary refill <2 sec  Neurological: AAOx3, CN Grossly intact  Skin: Normal temperature, warm, dry    MEDICATIONS  (STANDING):  ascorbic acid 500 milliGRAM(s) Oral daily  aspirin enteric coated 81 milliGRAM(s) Oral daily  atorvastatin 40 milliGRAM(s) Oral at bedtime  carvedilol 6.25 milliGRAM(s) Oral every 12 hours  cefTRIAXone   IVPB 1000 milliGRAM(s) IV Intermittent every 24 hours  chlorhexidine 2% Cloths 1 Application(s) Topical daily  dextrose 50% Injectable 25 Gram(s) IV Push once  dextrose 50% Injectable 25 Gram(s) IV Push once  dextrose 50% Injectable 12.5 Gram(s) IV Push once  dextrose Oral Gel 15 Gram(s) Oral once  glucagon  Injectable 1 milliGRAM(s) IntraMuscular once  lactulose Syrup 10 Gram(s) Oral daily  latanoprost 0.005% Ophthalmic Solution 1 Drop(s) Both EYES at bedtime  mirtazapine 15 milliGRAM(s) Oral at bedtime  multivitamin 1 Tablet(s) Oral daily  pantoprazole   Suspension 40 milliGRAM(s) Oral daily  polyethylene glycol 3350 17 Gram(s) Oral daily  prednisoLONE acetate 1% Suspension 1 Drop(s) Both EYES every 12 hours  senna 2 Tablet(s) Oral at bedtime    MEDICATIONS  (PRN):  acetaminophen     Tablet .. 650 milliGRAM(s) Oral every 6 hours PRN Temp greater or equal to 38C (100.4F), Mild Pain (1 - 3)  LORazepam   Injectable 1 milliGRAM(s) IV Push every 8 hours PRN seizure >2 minutes  midodrine. 7.5 milliGRAM(s) Oral <User Schedule> PRN PRIOR TO HEMODIALYSIS  ondansetron Injectable 4 milliGRAM(s) IV Push every 8 hours PRN Nausea and/or Vomiting  sodium chloride 0.9% Bolus. 100 milliLiter(s) IV Bolus every 5 minutes PRN SBP LESS THAN or EQUAL to 90 mmHg      Allergies    No Known Allergies    Intolerances        LABS:                        9.9    6.28  )-----------( 197      ( 04 Jul 2024 08:56 )             31.4     07-04    140  |  98  |  25<H>  ----------------------------<  96  4.1   |  29  |  3.38<H>    Ca    8.7      04 Jul 2024 08:56  Phos  3.1     07-04  Mg     2.40     07-04    TPro  8.5<H>  /  Alb  3.5  /  TBili  0.3  /  DBili  x   /  AST  20  /  ALT  10  /  AlkPhos  106  07-04    PT/INR - ( 03 Jul 2024 18:30 )   PT: 11.4 sec;   INR: 1.01 ratio         PTT - ( 03 Jul 2024 18:30 )  PTT:30.4 sec  Urinalysis Basic - ( 04 Jul 2024 08:56 )    Color: x / Appearance: x / SG: x / pH: x  Gluc: 96 mg/dL / Ketone: x  / Bili: x / Urobili: x   Blood: x / Protein: x / Nitrite: x   Leuk Esterase: x / RBC: x / WBC x   Sq Epi: x / Non Sq Epi: x / Bacteria: x        RADIOLOGY & ADDITIONAL TESTS:  Reviewed      ******************  Authored By: Elle Pool MD, PGY1  [employer] Preferred  ******************

## 2024-07-05 NOTE — DIETITIAN INITIAL EVALUATION ADULT - PROBLEM SELECTOR PROBLEM 6
Hyman? Thanks.   
Per patient Dr Joseph told her to come back for fasting lab for diabetes. Lab id [id number]. Please add order  
Pt was advised to est care with new pcp for blood work.     
Dementia

## 2024-07-05 NOTE — DIETITIAN INITIAL EVALUATION ADULT - ADD RECOMMEND
Swallow evaluation to assess current dysphagia diet. Continue w/ micronutrient supplementation. Monitor PO intake.

## 2024-07-05 NOTE — PROGRESS NOTE ADULT - ASSESSMENT
Assessment and Plan:   · Assessment	  69 year old M hx of ESRD on HD left AVF MWFr, CVA w/ gliosis, BKA/AKA functional quadriplegia, CAD, HTN, HLD, recent admission for MRSA bacteremia here w/ not responding at his extended care facility. 69 year old M hx of ESRD on HD left AVF MWFr, CVA w/ gliosis, BKA/AKA functional quadriplegia, CAD, HTN, HLD, recent admission for MRSA bacteremia here w/ not responding at his extended care facility. Vaginal Delivery

## 2024-07-05 NOTE — DIETITIAN INITIAL EVALUATION ADULT - ORAL INTAKE PTA/DIET HISTORY
Patient seen for assessment. Patient out of room, noted w/ plan for HD today per chart. Information obtained from chart review.

## 2024-07-05 NOTE — DIETITIAN INITIAL EVALUATION ADULT - OTHER INFO
69 year old male with a PMH of ESRD on HD left AVF MWF, CVA w/ gliosis, BKA/AKA functional quadriplegia, CAD, HTN, HLD, recent admission for MRSA bacteremia here w/ not responding at his extended care facility per chart.    Patient seen during breakfast, which was touched yet. No GI distress noted. Last bowel movement (7/4) per RN flow sheet. Has no food allergies. Unable to obtain UBW at this time. Per previous RD note (4/26) noted w/ weight 49 kg. Per HIE, noted w/ weights 51.3 kg (10/7/23), 49 kg (9/12/23), 50.1 kg (8/26/23) and 45.4 kg (6/21/23). ABW is 48.2 kg (7/5) *pre HD per chart indicating overall stable weight trend. No edema or pressure injuries noted per RN flow sheet.

## 2024-07-05 NOTE — PROGRESS NOTE ADULT - ASSESSMENT
69 year old M hx of ESRD on HD left AVF MWFr, CVA w/ gliosis, BKA/AKA functional quadriplegia, CAD, HTN, HLD, recent admission for MRSA bacteremia here w/ not responding at his extended care facility.    ESRD:  On HD TIW via A-V Fistula.  Schedule is MWF. Consent obtained and charted.  hd today, continue as ordered  - Renal diet.    Unresponsiveness:  work up per team  possible sec to sepsis     Hyperkalemia:  Specimen wad hemolyzed.  EKG not consistent with Hyperkalemia.  resolved  hd per schedule  - Follow up BMP.    anemia  slightly low  epo with hd  monitor

## 2024-07-06 LAB
ANION GAP SERPL CALC-SCNC: 14 MMOL/L — SIGNIFICANT CHANGE UP (ref 7–14)
BUN SERPL-MCNC: 22 MG/DL — SIGNIFICANT CHANGE UP (ref 7–23)
CALCIUM SERPL-MCNC: 9.2 MG/DL — SIGNIFICANT CHANGE UP (ref 8.4–10.5)
CHLORIDE SERPL-SCNC: 96 MMOL/L — LOW (ref 98–107)
CO2 SERPL-SCNC: 28 MMOL/L — SIGNIFICANT CHANGE UP (ref 22–31)
CREAT SERPL-MCNC: 3.81 MG/DL — HIGH (ref 0.5–1.3)
EGFR: 16 ML/MIN/1.73M2 — LOW
GLUCOSE SERPL-MCNC: 61 MG/DL — LOW (ref 70–99)
HCT VFR BLD CALC: 39.2 % — SIGNIFICANT CHANGE UP (ref 39–50)
HGB BLD-MCNC: 12.5 G/DL — LOW (ref 13–17)
MAGNESIUM SERPL-MCNC: 2.3 MG/DL — SIGNIFICANT CHANGE UP (ref 1.6–2.6)
MCHC RBC-ENTMCNC: 27.2 PG — SIGNIFICANT CHANGE UP (ref 27–34)
MCHC RBC-ENTMCNC: 31.9 GM/DL — LOW (ref 32–36)
MCV RBC AUTO: 85.2 FL — SIGNIFICANT CHANGE UP (ref 80–100)
NRBC # BLD: 0 /100 WBCS — SIGNIFICANT CHANGE UP (ref 0–0)
NRBC # FLD: 0 K/UL — SIGNIFICANT CHANGE UP (ref 0–0)
PHOSPHATE SERPL-MCNC: 3.5 MG/DL — SIGNIFICANT CHANGE UP (ref 2.5–4.5)
PLATELET # BLD AUTO: 225 K/UL — SIGNIFICANT CHANGE UP (ref 150–400)
POTASSIUM SERPL-MCNC: 4.2 MMOL/L — SIGNIFICANT CHANGE UP (ref 3.5–5.3)
POTASSIUM SERPL-SCNC: 4.2 MMOL/L — SIGNIFICANT CHANGE UP (ref 3.5–5.3)
RBC # BLD: 4.6 M/UL — SIGNIFICANT CHANGE UP (ref 4.2–5.8)
RBC # FLD: 19.6 % — HIGH (ref 10.3–14.5)
SODIUM SERPL-SCNC: 138 MMOL/L — SIGNIFICANT CHANGE UP (ref 135–145)
WBC # BLD: 3.16 K/UL — LOW (ref 3.8–10.5)
WBC # FLD AUTO: 3.16 K/UL — LOW (ref 3.8–10.5)

## 2024-07-06 PROCEDURE — 99223 1ST HOSP IP/OBS HIGH 75: CPT

## 2024-07-06 PROCEDURE — 95720 EEG PHY/QHP EA INCR W/VEEG: CPT

## 2024-07-06 PROCEDURE — 99232 SBSQ HOSP IP/OBS MODERATE 35: CPT | Mod: GC

## 2024-07-06 RX ORDER — DEXTROSE 30 % IN WATER 30 %
12.5 VIAL (ML) INTRAVENOUS ONCE
Refills: 0 | Status: COMPLETED | OUTPATIENT
Start: 2024-07-06 | End: 2024-07-06

## 2024-07-06 RX ADMIN — Medication 1 DROP(S): at 05:25

## 2024-07-06 RX ADMIN — MIRTAZAPINE 15 MILLIGRAM(S): 15 TABLET, FILM COATED ORAL at 22:28

## 2024-07-06 RX ADMIN — POLYETHYLENE GLYCOL 3350 17 GRAM(S): 1 POWDER ORAL at 13:31

## 2024-07-06 RX ADMIN — ASPIRIN 81 MILLIGRAM(S): 325 TABLET, FILM COATED ORAL at 13:30

## 2024-07-06 RX ADMIN — ATORVASTATIN CALCIUM 40 MILLIGRAM(S): 20 TABLET, FILM COATED ORAL at 22:28

## 2024-07-06 RX ADMIN — Medication 500 MILLIGRAM(S): at 13:30

## 2024-07-06 RX ADMIN — CARVEDILOL PHOSPHATE 6.25 MILLIGRAM(S): 80 CAPSULE, EXTENDED RELEASE ORAL at 18:41

## 2024-07-06 RX ADMIN — LATANOPROST PF 1 DROP(S): 0.05 SOLUTION/ DROPS OPHTHALMIC at 22:28

## 2024-07-06 RX ADMIN — Medication 1 TABLET(S): at 13:32

## 2024-07-06 RX ADMIN — Medication 1 DROP(S): at 18:40

## 2024-07-06 RX ADMIN — Medication 1 APPLICATION(S): at 13:31

## 2024-07-06 RX ADMIN — Medication 2 TABLET(S): at 22:28

## 2024-07-06 RX ADMIN — Medication 12.5 GRAM(S): at 08:56

## 2024-07-06 RX ADMIN — LACTULOSE 10 GRAM(S): 10 SOLUTION ORAL at 13:30

## 2024-07-06 RX ADMIN — PANTOPRAZOLE SODIUM 40 MILLIGRAM(S): 40 INJECTION, POWDER, FOR SOLUTION INTRAVENOUS at 13:30

## 2024-07-06 RX ADMIN — CARVEDILOL PHOSPHATE 6.25 MILLIGRAM(S): 80 CAPSULE, EXTENDED RELEASE ORAL at 05:25

## 2024-07-06 NOTE — CHART NOTE - NSCHARTNOTEFT_GEN_A_CORE
Consult completed by other RD dated on 7/5/24. Refer to initial RDN note for recommendations    Brigette (Meg) Aaron KEITH, RD ([id number]) | Also available on TEAMS

## 2024-07-06 NOTE — CONSULT NOTE ADULT - ATTENDING COMMENTS
I have examined the pt at bedside.  The risks and the benefits of the proposed diagnostic/therapeutic approach were thoroughly discussed.   I agree with the above plan and have modified it where necessary.    70yo RH AAM, PMHx significant for ESRD on HD left AVF MWFr, bilateral occipital lobes chronic infarcts, BKA/AKA functional quadriplegia, CAD, HTN, HLD admitted after an episode of unresponsiveness at living facility - found to have small amount of blood in his mouth with bite to lower lip and unresponsive.   Exam limited, pt oriented for self, obeys simple commands, unaware of current status.  VA poor in both eyes with bilateral HHA, can barely see shapes in front of him; slurred speech; no other focality noted in UE; LE are limited by BKA/AKA, can move the stumps.    Impression:   -Episode of LOC due to possible syncope vs seizure      Recommendation:  [] vEEG - limit to 1 overnight  [] Seizure precautions  [] difficult to assess if pt is at baseline - if MS worsens, can obtain MRI brain.

## 2024-07-06 NOTE — CHART NOTE - NSCHARTNOTEFT_GEN_A_CORE
EEG preliminary read (not final) on the initial recording hour(s) = >1 hr    No epileptiform abnormalities or seizures.      Final report to follow tomorrow morning after completion of study.    [facility] EEG Reading Room Ph#: [phone]  Epilepsy Answering Service after 5PM and before 8:30AM: Ph#: [phone]

## 2024-07-06 NOTE — CONSULT NOTE ADULT - SUBJECTIVE AND OBJECTIVE BOX
Neurology - Consult Note    -  Spectra: [phone] ([facility]), [phone] ([facility])  -    HPI: Patient JAMES PATRICK is a 69y (1955) man with a PMHx significant for ESRD on HD left AVF MWFr, bilateral occipital lobes chronic infarcts, BKA/AKA functional quadriplegia, CAD, HTN, HLD, Dementia, admitted after an episode of unresponsiveness at living facility. Patient poor historian. Per chart review was found to have small amount of blood in his mouth with bite to lower lip. Patient returned to baseline while in ED. No further episodes while admitted.       Review of Systems:   All other review of systems is negative unless indicated above.    Allergies:  No Known Allergies      PMHx/PSHx/Family Hx: As above, otherwise see below   DM (diabetes mellitus)    HTN (hypertension)    Kidney disease    Below knee amputation status, right    CKD (chronic kidney disease)    MRSA (methicillin resistant Staphylococcus aureus) colonization    Wound    No pertinent past medical history    DM (diabetes mellitus)    HTN (hypertension)    Kidney disease        Social Hx:  No current use of tobacco, alcohol, or illicit drugs      Medications:  MEDICATIONS  (STANDING):  ascorbic acid 500 milliGRAM(s) Oral daily  aspirin enteric coated 81 milliGRAM(s) Oral daily  atorvastatin 40 milliGRAM(s) Oral at bedtime  carvedilol 6.25 milliGRAM(s) Oral every 12 hours  chlorhexidine 2% Cloths 1 Application(s) Topical daily  dextrose 50% Injectable 12.5 Gram(s) IV Push once  dextrose 50% Injectable 25 Gram(s) IV Push once  dextrose 50% Injectable 25 Gram(s) IV Push once  dextrose Oral Gel 15 Gram(s) Oral once  epoetin dinah (EPOGEN) Injectable 6000 Unit(s) IV Push <User Schedule>  glucagon  Injectable 1 milliGRAM(s) IntraMuscular once  lactulose Syrup 10 Gram(s) Oral daily  latanoprost 0.005% Ophthalmic Solution 1 Drop(s) Both EYES at bedtime  mirtazapine 15 milliGRAM(s) Oral at bedtime  multivitamin 1 Tablet(s) Oral daily  pantoprazole   Suspension 40 milliGRAM(s) Oral daily  polyethylene glycol 3350 17 Gram(s) Oral daily  prednisoLONE acetate 1% Suspension 1 Drop(s) Both EYES every 12 hours  senna 2 Tablet(s) Oral at bedtime    MEDICATIONS  (PRN):  acetaminophen     Tablet .. 650 milliGRAM(s) Oral every 6 hours PRN Temp greater or equal to 38C (100.4F), Mild Pain (1 - 3)  LORazepam   Injectable 1 milliGRAM(s) IV Push every 8 hours PRN seizure >2 minutes  midodrine. 7.5 milliGRAM(s) Oral <User Schedule> PRN PRIOR TO HEMODIALYSIS  ondansetron Injectable 4 milliGRAM(s) IV Push every 8 hours PRN Nausea and/or Vomiting  sodium chloride 0.9% Bolus. 100 milliLiter(s) IV Bolus every 5 minutes PRN SBP LESS THAN or EQUAL to 90 mmHg      Vitals:  T(C): 36.3 (07-06-24 @ 05:07), Max: 36.5 (07-05-24 @ 14:40)  HR: 69 (07-06-24 @ 05:07) (69 - 77)  BP: 114/58 (07-06-24 @ 05:07) (106/55 - 127/61)  RR: 17 (07-06-24 @ 05:07) (17 - 18)  SpO2: 100% (07-06-24 @ 05:07) (99% - 100%)    Physical Examination:   General - NAD    Neurologic Exam:  Mental status - Awake, Alert, Oriented self only. Speech fluent. Follows simple commands    Cranial nerves - Field cut b/l, face grossly symmetric, hearing intact b/l, eyes cross midline  Frontal release signs present    Motor -   Strength testing  Moving upper extremity antigravity   Amputation Above knee left and below knee right leg    Coordination -  No tremors appreciated    Gait and station - Unable  to participate      Labs:                        12.5   3.16  )-----------( 225      ( 06 Jul 2024 06:30 )             39.2     07-06    138  |  96<L>  |  22  ----------------------------<  61<L>  4.2   |  28  |  3.81<H>    Ca    9.2      06 Jul 2024 06:30  Phos  3.5     07-06  Mg     2.30     07-06      CAPILLARY BLOOD GLUCOSE      POCT Blood Glucose.: 123 mg/dL (06 Jul 2024 12:25)        Culture - Urine (collected 03 Jul 2024 21:34)  Source: Clean Catch Clean Catch (Midstream)  Final Report (04 Jul 2024 21:52):    No growth    Culture - Blood (collected 03 Jul 2024 18:10)  Source: .Blood Blood-Peripheral  Preliminary Report (05 Jul 2024 23:02):    No growth at 48 Hours    Culture - Blood (collected 03 Jul 2024 18:00)  Source: .Blood Blood-Peripheral  Preliminary Report (05 Jul 2024 22:02):    No growth at 48 Hours            Radiology   < from: CT Head No Cont (07.03.24 @ 18:28) >    IMPRESSION:   Encephalomalacia and gliosis in the BILATERAL occipital   lobes and cerebellum secondary to old infarctions. Moderate   periventricular white matter ischemia is noted.    < end of copied text >  
  Carlos Dumont MD  Nephrology  [phone]    JAMES PATRICK  Patient is a 69y old  Male who presents with a chief complaint of   HPI:  This is a patient ESRD on HD, sent to ER after being found unresponsive at NH.  He remains unresponsive to verbal stimuli.    PAST MEDICAL & SURGICAL HISTORY:  DM (diabetes mellitus)      HTN (hypertension)      Below knee amputation status, right      CKD (chronic kidney disease)      MRSA (methicillin resistant Staphylococcus aureus) colonization  (hx/o MRSA growth from wound culture)      Wound  (chronic wounds to left foot and leg)      DM (diabetes mellitus)      HTN (hypertension)      Kidney disease      S/P BKA (below knee amputation) unilateral, right      H/O peripheral artery bypass  left fem to below-knee pop with RSVG      Amputated left leg  above knee      Amputated right leg  below knee        MEDICATIONS  (STANDING):  cefTRIAXone   IVPB 1000 milliGRAM(s) IV Intermittent once    Allergies    No Known Allergies    Intolerances      FAMILY HISTORY:  Family history of diabetes mellitus        REVIEW OF SYSTEMS  Not able to assess.    Vital Signs Last 24 Hrs  T(C): 37.1 (2024 18:07), Max: 37.3 (2024 17:45)  T(F): 98.7 (2024 18:07), Max: 99.1 (2024 17:45)  HR: 65 (2024 19:33) (65 - 74)  BP: 123/61 (2024 19:33) (123/61 - 136/62)  BP(mean): --  RR: 18 (2024 19:33) (14 - 18)  SpO2: 100% (2024 19:33) (87% - 100%)    Parameters below as of 2024 19:33  Patient On (Oxygen Delivery Method): nasal cannula  O2 Flow (L/min): 2      PHYSICAL EXAMINATION:  Constitutional:  He appears comfortable and not distressed. Not diaphoretic.    Neck:  The thyroid is normal. Trachea is midline.     Respiratory: The lungs are clear to auscultation. No dullness and expansion is normal.    Cardiovascular: S1 and S2 are normal. No mummurs, rubs or gallops are present.    Gastrointestinal: The abdomen is soft. No tenderness is present. No masses are present. Bowel sounds are normal.    Genitourinary: The bladder is not distended. No CVA tenderness is present.    Extremities: No edema is noted. L AKA, R BKA.    Neurological: Responds to pain.    Skin: No lesions are seen  or palpated.    Lymph Nodes: No lymphadenopathy is present.                            10.7   6.27  )-----------( 192      ( 2024 18:49 )             35.0     07-03    141  |  100  |  51<H>  ----------------------------<  111<H>  6.6<HH>   |  29  |  5.15<H>  K is from hemolyzed specimen.    Ca    8.9      2024 18:32  Phos  2.2     07-03  Mg     3.10     07-03    TPro  7.9  /  Alb  3.1<L>  /  TBili  0.3  /  DBili  x   /  AST  49<H>  /  ALT  13  /  AlkPhos  106  07-03    LIVER FUNCTIONS - ( 2024 18:32 )  Alb: 3.1 g/dL / Pro: 7.9 g/dL / ALK PHOS: 106 U/L / ALT: 13 U/L / AST: 49 U/L / GGT: x           Urinalysis Basic - ( 2024 21:50 )    Color: Dark Yellow / Appearance: Turbid / S.020 / pH: x  Gluc: x / Ketone: Negative mg/dL  / Bili: Negative / Urobili: 0.2 mg/dL   Blood: x / Protein: 300 mg/dL / Nitrite: Negative   Leuk Esterase: Moderate / RBC: 38 /HPF / WBC 43 /HPF   Sq Epi: x / Non Sq Epi: 0 /HPF / Bacteria: Many /HPF

## 2024-07-06 NOTE — PROGRESS NOTE ADULT - SUBJECTIVE AND OBJECTIVE BOX
[facility] [place] [facility]   MD MARIBELL CARRION MD MARIA SANTIAGO, NP    TEL:  OFFICE: [phone]  From 5pm-7am Answering Service [phone]    -- RENAL FOLLOW UP NOTE ---Date of Service 07-06-24 @ 15:06    Patient is a 69y old  Male who presents with a chief complaint of AMS      Patient seen and examined at bedside. No chest pain/sob    VITALS:  T(F): 97.4 (07-06-24 @ 12:15), Max: 97.7 (07-05-24 @ 20:39)  HR: 71 (07-06-24 @ 12:15)  BP: 115/64 (07-06-24 @ 12:15)  RR: 18 (07-06-24 @ 12:15)  SpO2: 100% (07-06-24 @ 12:15)  Wt(kg): --    07-05 @ 07:01  -  07-06 @ 07:00  --------------------------------------------------------  IN: 500 mL / OUT: 2076 mL / NET: -1576 mL          PHYSICAL EXAM:  General: NAD  Neck: No JVD  Respiratory: CTAB, no wheezes, rales or rhonchi  Cardiovascular: S1, S2, RRR  Gastrointestinal: BS+, soft, NT/ND  Extremities: b/l [facility] Medications:   MEDICATIONS  (STANDING):  ascorbic acid 500 milliGRAM(s) Oral daily  aspirin enteric coated 81 milliGRAM(s) Oral daily  atorvastatin 40 milliGRAM(s) Oral at bedtime  carvedilol 6.25 milliGRAM(s) Oral every 12 hours  chlorhexidine 2% Cloths 1 Application(s) Topical daily  dextrose 50% Injectable 25 Gram(s) IV Push once  dextrose 50% Injectable 25 Gram(s) IV Push once  dextrose 50% Injectable 12.5 Gram(s) IV Push once  dextrose Oral Gel 15 Gram(s) Oral once  epoetin dinah (EPOGEN) Injectable 6000 Unit(s) IV Push <User Schedule>  glucagon  Injectable 1 milliGRAM(s) IntraMuscular once  lactulose Syrup 10 Gram(s) Oral daily  latanoprost 0.005% Ophthalmic Solution 1 Drop(s) Both EYES at bedtime  mirtazapine 15 milliGRAM(s) Oral at bedtime  multivitamin 1 Tablet(s) Oral daily  pantoprazole   Suspension 40 milliGRAM(s) Oral daily  polyethylene glycol 3350 17 Gram(s) Oral daily  prednisoLONE acetate 1% Suspension 1 Drop(s) Both EYES every 12 hours  senna 2 Tablet(s) Oral at bedtime      LABS:  07-06    138  |  96<L>  |  22  ----------------------------<  61<L>  4.2   |  28  |  3.81<H>    Ca    9.2      06 Jul 2024 06:30  Phos  3.5     07-06  Mg     2.30     07-06      Creatinine Trend: 3.81 <--, 4.48 <--, 3.38 <--, 2.69 <--, 1.86 <--, 5.49 <--, 5.15 <--    Phosphorus: 3.5 mg/dL (07-06 @ 06:30)                              12.5   3.16  )-----------( 225      ( 06 Jul 2024 06:30 )             39.2     Urine Studies:  Urinalysis - [07-06-24 @ 06:30]      Color  / Appearance  / SG  / pH       Gluc 61 / Ketone   / Bili  / Urobili        Blood  / Protein  / Leuk Est  / Nitrite       RBC  / WBC  / Hyaline  / Gran  / Sq Epi  / Non Sq Epi  / Bacteria       Iron 42, TIBC 127, %sat 33      [05-11-24 @ 07:26]  Ferritin 1680      [05-11-24 @ 07:26]  PTH -- (Ca --)      [04-28-24 @ 05:00]   155  HbA1c 4.2      [07-19-19 @ 09:56]  TSH 1.96      [05-07-24 @ 04:54]  Lipid: chol 119, TG 66, HDL 46, LDL --      [10-07-23 @ 09:30]    HBsAb 55.8      [07-03-24 @ 23:30]  HBsAg Nonreact      [07-03-24 @ 23:30]  HBcAb Nonreact      [07-03-24 @ 23:30]  HCV 0.11, Nonreact      [07-03-24 @ 23:30]      RADIOLOGY & ADDITIONAL STUDIES:

## 2024-07-06 NOTE — PROGRESS NOTE ADULT - ATTENDING COMMENTS
69 year old M hx of ESRD on HD left AVF MWFr, CVA w/ gliosis, BKA/AKA functional quadriplegia, CAD, HTN, HLD, recent admission for MRSA bacteremia admitted for non responsiveness at facility. Now improved. S/p CTH notable for encephalomalacia and gliosis in the b/l occipital   lobes and cerebellum secondary to old infarctions and oderate periventricular white matter ischemia is noted. Also noted to have elevated Hst.     -Encephalopathy thought to be 2/2 UTI. However, pt noted to have recovery of MS after straight cath in the ED and urine cx neg (obtained prior to abx) which does not fit picture for UTI. Furthermore he has been afebrile and w/o leukocytosis. 7/3 blood cx NTD. Would dc ceftriaxone and monitor off abx.   -Noted elevated troponin levels and TWI on EKG. However CKMB and CK wnl and pt w/ ESRD. Low concern for ACS. C/w aspirin 81 mg , lipitor 40 mg and carvedilol 6.25 mg BID. Repeat EKG. Will obtain TTE   -Given gliosis, biting of lower tongue ordered vEEG. Too late to obtain prolactin level at this time. Neuro checks. Pt currently with no further episodes. Given no identifiable source of pts symptoms.  consulted  neuro , neuro agree with VEEG , and rec  if MS worsens, can obtain MRI brain.  Will f/u VEEG report

## 2024-07-06 NOTE — PROGRESS NOTE ADULT - ASSESSMENT
69 year old M hx of ESRD on HD left AVF MWFr, CVA w/ gliosis, BKA/AKA functional quadriplegia, CAD, HTN, HLD, recent admission for MRSA bacteremia here w/ not responding at his extended care facility.    ESRD:  On HD TIW via A-V Fistula.  Schedule is MWF. Consent obtained and charted.  s/p HD 7/5 UF 1576  - Renal diet.    Unresponsiveness:  work up per team  possible sec to sepsis     Hyperkalemia:  Specimen wad hemolyzed.  EKG not consistent with Hyperkalemia.  resolved  hd per schedule  - Follow up BMP.    anemia  fluctuating; above goal today  epo with hd as needed   monitor

## 2024-07-06 NOTE — CONSULT NOTE ADULT - ASSESSMENT
69y (1955) man with a PMHx significant for ESRD on HD left AVF MWFr, bilateral occipital lobes chronic infarcts, BKA/AKA functional quadriplegia, CAD, HTN, HLD admitted after an episode of unresponsiveness at living facility. Patient poor historian. Per chart review was found to have small amount of blood in his mouth with bite to lower lip. On exam, oriented 1, per chart review this is baseline, visual deficits bilaterally due to likely known stroke.     Impression: Episode of LOC due to possible  syncope vs seizure vs toxic, metabolic, infectious etiology     Recommendation:  [] vEEG  [] Seizure precautions    Seen with Dr. Lance.

## 2024-07-06 NOTE — PROGRESS NOTE ADULT - SUBJECTIVE AND OBJECTIVE BOX
INTERVAL HPI/OVERNIGHT EVENTS:  Pt seen and examined at bedside. No acute overnight events or complaints.    VITAL SIGNS:  T(F): 97.3 (07-06-24 @ 05:07)  HR: 69 (07-06-24 @ 05:07)  BP: 114/58 (07-06-24 @ 05:07)  RR: 17 (07-06-24 @ 05:07)  SpO2: 100% (07-06-24 @ 05:07)  Wt(kg): --    PHYSICAL EXAM:    Constitutional: WDWN, NAD  HEENT: PERRL, EOMI, sclera non-icteric, neck supple, trachea midline, no masses, no JVD, MMM, good dentition  Respiratory: CTA b/l, good air entry b/l, no wheezing, no rhonchi, no rales, without accessory muscle use and no intercostal retractions  Cardiovascular: RRR, normal S1S2, no M/R/G  Gastrointestinal: soft, NTND, no masses palpable, BS normal  Extremities: Warm, well perfused, pulses equal bilateral upper and lower extremities, no edema, no clubbing. Capillary refill <2 sec  Neurological: AAOx3, CN Grossly intact  Skin: Normal temperature, warm, dry    MEDICATIONS  (STANDING):  ascorbic acid 500 milliGRAM(s) Oral daily  aspirin enteric coated 81 milliGRAM(s) Oral daily  atorvastatin 40 milliGRAM(s) Oral at bedtime  carvedilol 6.25 milliGRAM(s) Oral every 12 hours  chlorhexidine 2% Cloths 1 Application(s) Topical daily  dextrose 50% Injectable 25 Gram(s) IV Push once  dextrose 50% Injectable 12.5 Gram(s) IV Push once  dextrose 50% Injectable 25 Gram(s) IV Push once  dextrose Oral Gel 15 Gram(s) Oral once  epoetin dinah (EPOGEN) Injectable 6000 Unit(s) IV Push <User Schedule>  glucagon  Injectable 1 milliGRAM(s) IntraMuscular once  lactulose Syrup 10 Gram(s) Oral daily  latanoprost 0.005% Ophthalmic Solution 1 Drop(s) Both EYES at bedtime  mirtazapine 15 milliGRAM(s) Oral at bedtime  multivitamin 1 Tablet(s) Oral daily  pantoprazole   Suspension 40 milliGRAM(s) Oral daily  polyethylene glycol 3350 17 Gram(s) Oral daily  prednisoLONE acetate 1% Suspension 1 Drop(s) Both EYES every 12 hours  senna 2 Tablet(s) Oral at bedtime    MEDICATIONS  (PRN):  acetaminophen     Tablet .. 650 milliGRAM(s) Oral every 6 hours PRN Temp greater or equal to 38C (100.4F), Mild Pain (1 - 3)  LORazepam   Injectable 1 milliGRAM(s) IV Push every 8 hours PRN seizure >2 minutes  midodrine. 7.5 milliGRAM(s) Oral <User Schedule> PRN PRIOR TO HEMODIALYSIS  ondansetron Injectable 4 milliGRAM(s) IV Push every 8 hours PRN Nausea and/or Vomiting  sodium chloride 0.9% Bolus. 100 milliLiter(s) IV Bolus every 5 minutes PRN SBP LESS THAN or EQUAL to 90 mmHg      Allergies    No Known Allergies    Intolerances        LABS:                        9.9    3.96  )-----------( 203      ( 05 Jul 2024 06:55 )             32.4     07-05    139  |  99  |  33<H>  ----------------------------<  70  4.1   |  28  |  4.48<H>    Ca    8.3<L>      05 Jul 2024 06:55  Phos  3.9     07-05  Mg     2.50     07-05        Urinalysis Basic - ( 05 Jul 2024 06:55 )    Color: x / Appearance: x / SG: x / pH: x  Gluc: 70 mg/dL / Ketone: x  / Bili: x / Urobili: x   Blood: x / Protein: x / Nitrite: x   Leuk Esterase: x / RBC: x / WBC x   Sq Epi: x / Non Sq Epi: x / Bacteria: x        RADIOLOGY & ADDITIONAL TESTS:  Reviewed      ******************  Authored By: Jane Hawk MD PGY3  Internal Medicine  Pager: [phone] [facility]// [id number] [address]  MS Teams Preferred  ******************

## 2024-07-07 LAB
ANION GAP SERPL CALC-SCNC: 17 MMOL/L — HIGH (ref 7–14)
BUN SERPL-MCNC: 34 MG/DL — HIGH (ref 7–23)
CALCIUM SERPL-MCNC: 8.8 MG/DL — SIGNIFICANT CHANGE UP (ref 8.4–10.5)
CHLORIDE SERPL-SCNC: 96 MMOL/L — LOW (ref 98–107)
CO2 SERPL-SCNC: 23 MMOL/L — SIGNIFICANT CHANGE UP (ref 22–31)
CREAT SERPL-MCNC: 5.05 MG/DL — HIGH (ref 0.5–1.3)
EGFR: 12 ML/MIN/1.73M2 — LOW
GLUCOSE SERPL-MCNC: 52 MG/DL — CRITICAL LOW (ref 70–99)
HCT VFR BLD CALC: 38.2 % — LOW (ref 39–50)
HGB BLD-MCNC: 11.6 G/DL — LOW (ref 13–17)
MAGNESIUM SERPL-MCNC: 2.5 MG/DL — SIGNIFICANT CHANGE UP (ref 1.6–2.6)
MCHC RBC-ENTMCNC: 26 PG — LOW (ref 27–34)
MCHC RBC-ENTMCNC: 30.4 GM/DL — LOW (ref 32–36)
MCV RBC AUTO: 85.7 FL — SIGNIFICANT CHANGE UP (ref 80–100)
NRBC # BLD: 0 /100 WBCS — SIGNIFICANT CHANGE UP (ref 0–0)
NRBC # FLD: 0 K/UL — SIGNIFICANT CHANGE UP (ref 0–0)
PHOSPHATE SERPL-MCNC: 4.6 MG/DL — HIGH (ref 2.5–4.5)
PLATELET # BLD AUTO: 173 K/UL — SIGNIFICANT CHANGE UP (ref 150–400)
POTASSIUM SERPL-MCNC: 4.9 MMOL/L — SIGNIFICANT CHANGE UP (ref 3.5–5.3)
POTASSIUM SERPL-SCNC: 4.9 MMOL/L — SIGNIFICANT CHANGE UP (ref 3.5–5.3)
RBC # BLD: 4.46 M/UL — SIGNIFICANT CHANGE UP (ref 4.2–5.8)
RBC # FLD: 19.3 % — HIGH (ref 10.3–14.5)
SODIUM SERPL-SCNC: 136 MMOL/L — SIGNIFICANT CHANGE UP (ref 135–145)
WBC # BLD: 3.87 K/UL — SIGNIFICANT CHANGE UP (ref 3.8–10.5)
WBC # FLD AUTO: 3.87 K/UL — SIGNIFICANT CHANGE UP (ref 3.8–10.5)

## 2024-07-07 PROCEDURE — 99232 SBSQ HOSP IP/OBS MODERATE 35: CPT | Mod: GC

## 2024-07-07 RX ORDER — DEXTROSE 30 % IN WATER 30 %
15 VIAL (ML) INTRAVENOUS ONCE
Refills: 0 | Status: COMPLETED | OUTPATIENT
Start: 2024-07-07 | End: 2024-07-07

## 2024-07-07 RX ORDER — DEXTROSE 30 % IN WATER 30 %
12.5 VIAL (ML) INTRAVENOUS ONCE
Refills: 0 | Status: COMPLETED | OUTPATIENT
Start: 2024-07-07 | End: 2024-07-07

## 2024-07-07 RX ADMIN — Medication 15 GRAM(S): at 09:18

## 2024-07-07 RX ADMIN — Medication 1 DROP(S): at 17:30

## 2024-07-07 RX ADMIN — PANTOPRAZOLE SODIUM 40 MILLIGRAM(S): 40 INJECTION, POWDER, FOR SOLUTION INTRAVENOUS at 14:04

## 2024-07-07 RX ADMIN — CARVEDILOL PHOSPHATE 6.25 MILLIGRAM(S): 80 CAPSULE, EXTENDED RELEASE ORAL at 06:01

## 2024-07-07 RX ADMIN — POLYETHYLENE GLYCOL 3350 17 GRAM(S): 1 POWDER ORAL at 14:04

## 2024-07-07 RX ADMIN — Medication 1 DROP(S): at 06:01

## 2024-07-07 RX ADMIN — CARVEDILOL PHOSPHATE 6.25 MILLIGRAM(S): 80 CAPSULE, EXTENDED RELEASE ORAL at 17:30

## 2024-07-07 RX ADMIN — ASPIRIN 81 MILLIGRAM(S): 325 TABLET, FILM COATED ORAL at 14:04

## 2024-07-07 RX ADMIN — Medication 12.5 GRAM(S): at 18:13

## 2024-07-07 RX ADMIN — Medication 500 MILLIGRAM(S): at 14:04

## 2024-07-07 RX ADMIN — Medication 1 TABLET(S): at 14:04

## 2024-07-07 RX ADMIN — LATANOPROST PF 1 DROP(S): 0.05 SOLUTION/ DROPS OPHTHALMIC at 21:42

## 2024-07-07 RX ADMIN — LACTULOSE 10 GRAM(S): 10 SOLUTION ORAL at 14:04

## 2024-07-07 RX ADMIN — Medication 1 APPLICATION(S): at 14:03

## 2024-07-07 NOTE — DISCHARGE NOTE PROVIDER - NSDCCPTREATMENT_GEN_ALL_CORE_FT
PRINCIPAL PROCEDURE  Procedure: MRI brain  Findings and Treatment: You had an MRI of your brain due to your altered mental status:  COMPARISON: Prior CT study of the head from 7/3/2024. Prior brain MRI   study dated 10/7/2023.  IMPRESSION:  1. Unchanged left-sided CP angle vestibular schwannoma.  2. Multiple unchanged chronic intracranial findings, as discussed.  3. Similar-appearing extensive chronic white matter microvascular type   changes.  4. No evidence of acute intracranial hemorrhage or acute cerebral   ischemia.      SECONDARY PROCEDURE  Procedure: Transthoracic echocardiography (TTE)  Findings and Treatment: You had an echo of your heart to check for how your heart valves were pumping blood.   CONCLUSIONS:      1. Left ventricular cavity is normal in size. Left ventricular systolic function is moderately decreased with an ejection fraction visually estimated at 35 to 40 %.   2. Mild left ventricular hypertrophy.   3. Normal right ventricular cavity size and normal right ventricular systolic function.   4. Structurally normal mitral valve with normal leaflet excursion.   5. Trileaflet aortic valve with reduced systolic excursion. There is calcification of the aortic valve leaflets.   6. The peak transaortic velocity is 1.54 m/s, peak transaortic gradient is 9.5 mmHg and mean transaortic gradient is 5.0 mmHg with an LVOT/aortic valve VTI ratio of 0.43.   7. Mild aortic regurgitation.   8. Estimated pulmonary artery systolic pressure is 23 mmHg.   9. The inferior vena cava is normal in size (normal <2.1cm) with normal inspiratory collapse (normal >50%) consistent with normal right atrial pressure (~3, range 0-5mmHg).  10. No pericardial effusion seen.  11. Left pleural effusion noted.  12. Compared to the transesophageal echocardiogram performed on 4/30/2024, there have been no significant interval changes.    Procedure: CT brain  Findings and Treatment: You had a CT of your brain when you came to the emergency department for altered mental status.  IMPRESSION:   Encephalomalacia and gliosis in the BILATERAL occipital   lobes and cerebellum secondary to old infarctions. Moderate   periventricular white matter ischemia is noted.      Procedure: EEG awake and asleep  Findings and Treatment: You had a 24 hour EEG in the hospital to look for seizure activity.  EEG Classification / Summary:  Abnormal EEG in the awake, drowsy, and asleep states.   Rare bilateral temporal focal slowing in drowsiness.  No epileptiform abnormalities are captured.   No seizures.  Clinical Impression:  Mild bilateral temporal focal cerebral dysfunction can be structural or functional in etiology.  No epileptiform abnormalities or seizures.    Procedure: 12 lead ECG interpretation  Findings and Treatment: You had an ECG done to look at your heart.  Diagnosis Line *** Critical Test Result: Long QTc  Normal sinus rhythm  Left ventricular hypertrophy with repolarization abnormality ( [place] product , [place] )  Prolonged QT (QTc 517)  Abnormal ECG

## 2024-07-07 NOTE — PROVIDER CONTACT NOTE (HYPOGLYCEMIA EVENT) - NS PROVIDER CONTACT BACKGROUND-HYPO
Age: 69y    Gender: Male    POCT Blood Glucose:  166 mg/dL (07-06-24 @ 09:29)  66 mg/dL (07-06-24 @ 08:28)  64 mg/dL (07-06-24 @ 08:27)      eMAR:  dextrose 50% Injectable   12.5 Gram(s) IV Push (07-06-24 @ 08:56)      
Age: 69y    Gender: Male    POCT Blood Glucose:  63 mg/dL (07-07-24 @ 08:47)  78 mg/dL (07-06-24 @ 22:22)  74 mg/dL (07-06-24 @ 18:16)  123 mg/dL (07-06-24 @ 12:25)  166 mg/dL (07-06-24 @ 09:29)      eMAR:atorvastatin   40 milliGRAM(s) Oral (07-06-24 @ 22:28)    dextrose Oral Gel   15 Gram(s) Oral (07-07-24 @ 09:18)    
Age: 69y    Gender: Male    POCT Blood Glucose:  120 mg/dL (07-07-24 @ 18:32)  56 mg/dL (07-07-24 @ 18:04)  71 mg/dL (07-07-24 @ 12:43)  69 mg/dL (07-07-24 @ 12:42)  63 mg/dL (07-07-24 @ 08:47)  78 mg/dL (07-06-24 @ 22:22)      eMAR:atorvastatin   40 milliGRAM(s) Oral (07-06-24 @ 22:28)    dextrose 50% Injectable   12.5 Gram(s) IV Push (07-07-24 @ 18:13)    dextrose Oral Gel   15 Gram(s) Oral (07-07-24 @ 09:18)    
Age: 69y    Gender: Male    POCT Blood Glucose:  233 mg/dL (07-05-24 @ 18:42)  71 mg/dL (07-05-24 @ 17:48)  69 mg/dL (07-05-24 @ 17:46)      eMAR:  dextrose 50% Injectable   50 milliLiter(s) IV Push (07-05-24 @ 18:23)

## 2024-07-07 NOTE — PROGRESS NOTE ADULT - SUBJECTIVE AND OBJECTIVE BOX
[facility]   MD MARIBELL CARRION MD RUORU WONG, PA    TEL:  OFFICE: [phone]    RENAL FOLLOW UP NOTE-- Date of Service ;; 07-07-24 @ 11:10  --------------------------------------------------------------------------------  HPI:  Pt seen and examined at bedside          PAST HISTORY  --------------------------------------------------------------------------------  No significant changes to PMH, PSH, FHx, SHx, unless otherwise noted    ALLERGIES & MEDICATIONS  --------------------------------------------------------------------------------  Allergies    No Known Allergies    Intolerances      Standing Inpatient Medications  ascorbic acid 500 milliGRAM(s) Oral daily  aspirin enteric coated 81 milliGRAM(s) Oral daily  atorvastatin 40 milliGRAM(s) Oral at bedtime  carvedilol 6.25 milliGRAM(s) Oral every 12 hours  chlorhexidine 2% Cloths 1 Application(s) Topical daily  dextrose 50% Injectable 25 Gram(s) IV Push once  dextrose 50% Injectable 25 Gram(s) IV Push once  dextrose 50% Injectable 12.5 Gram(s) IV Push once  dextrose Oral Gel 15 Gram(s) Oral once  epoetin dinah (EPOGEN) Injectable 6000 Unit(s) IV Push <User Schedule>  glucagon  Injectable 1 milliGRAM(s) IntraMuscular once  lactulose Syrup 10 Gram(s) Oral daily  latanoprost 0.005% Ophthalmic Solution 1 Drop(s) Both EYES at bedtime  mirtazapine 15 milliGRAM(s) Oral at bedtime  multivitamin 1 Tablet(s) Oral daily  pantoprazole   Suspension 40 milliGRAM(s) Oral daily  polyethylene glycol 3350 17 Gram(s) Oral daily  prednisoLONE acetate 1% Suspension 1 Drop(s) Both EYES every 12 hours  senna 2 Tablet(s) Oral at bedtime    PRN Inpatient Medications  acetaminophen     Tablet .. 650 milliGRAM(s) Oral every 6 hours PRN  LORazepam   Injectable 1 milliGRAM(s) IV Push every 8 hours PRN  midodrine. 7.5 milliGRAM(s) Oral <User Schedule> PRN  ondansetron Injectable 4 milliGRAM(s) IV Push every 8 hours PRN  sodium chloride 0.9% Bolus. 100 milliLiter(s) IV Bolus every 5 minutes PRN      REVIEW OF SYSTEMS  --------------------------------------------------------------------------------  General: no fever  CVS: no chest pain  RESP: no sob, no cough  ABD: no abdominal pain  : no dysuria,  MSK: no edema     VITALS/PHYSICAL EXAM  --------------------------------------------------------------------------------  T(C): 36.4 (07-07-24 @ 05:20), Max: 36.4 (07-06-24 @ 21:38)  HR: 66 (07-07-24 @ 05:20) (66 - 77)  BP: 116/71 (07-07-24 @ 05:20) (115/64 - 133/59)  RR: 17 (07-07-24 @ 05:20) (17 - 18)  SpO2: 100% (07-07-24 @ 05:20) (100% - 100%)  Wt(kg): --        Physical Exam:  	Gen: NAD  	HEENT: MMM  	Pulm: CTA B/L  	CV: S1S2  	Abd: Soft, +BS  	Ext: No LE edema B/L                    NAS MATIAS                      Neuro: Awake   	Skin: Warm and Dry   	Vascular access: AVF             LABS/STUDIES  --------------------------------------------------------------------------------              11.6   3.87  >-----------<  173      [07-07-24 @ 06:11]              38.2     136  |  96  |  34  ----------------------------<  52      [07-07-24 @ 06:11]  4.9   |  23  |  5.05        Ca     8.8     [07-07-24 @ 06:11]      Mg     2.50     [07-07-24 @ 06:11]      Phos  4.6     [07-07-24 @ 06:11]            Creatinine Trend:  SCr 5.05 [07-07 @ 06:11]  SCr 3.81 [07-06 @ 06:30]  SCr 4.48 [07-05 @ 06:55]  SCr 3.38 [07-04 @ 08:56]  SCr 2.69 [07-04 @ 07:05]    Urinalysis - [07-07-24 @ 06:11]      Color  / Appearance  / SG  / pH       Gluc 52 / Ketone   / Bili  / Urobili        Blood  / Protein  / Leuk Est  / Nitrite       RBC  / WBC  / Hyaline  / Gran  / Sq Epi  / Non Sq Epi  / Bacteria       Iron 42, TIBC 127, %sat 33      [05-11-24 @ 07:26]  Ferritin 1680      [05-11-24 @ 07:26]  PTH -- (Ca --)      [04-28-24 @ 05:00]   155  HbA1c 4.2      [07-19-19 @ 09:56]  TSH 1.96      [05-07-24 @ 04:54]  Lipid: chol 119, TG 66, HDL 46, LDL --      [10-07-23 @ 09:30]    HBsAb 55.8      [07-03-24 @ 23:30]  HBsAg Nonreact      [07-03-24 @ 23:30]  HBcAb Nonreact      [07-03-24 @ 23:30]  HCV 0.11, Nonreact      [07-03-24 @ 23:30]

## 2024-07-07 NOTE — PROGRESS NOTE ADULT - SUBJECTIVE AND OBJECTIVE BOX
INTERVAL HPI/OVERNIGHT EVENTS:  Pt seen and examined at bedside. No acute overnight events or complaints. Patient resting comfortably. Denied CP, SOB, HA.    VITAL SIGNS:  T(F): 97.6 (07-07-24 @ 05:20)  HR: 66 (07-07-24 @ 05:20)  BP: 116/71 (07-07-24 @ 05:20)  RR: 17 (07-07-24 @ 05:20)  SpO2: 100% (07-07-24 @ 05:20)  Wt(kg): --    PHYSICAL EXAM:    Constitutional: WDWN, NAD  HEENT: PERRL, EOMI, sclera non-icteric, neck supple, trachea midline, no masses, no JVD, MMM, good dentition  Respiratory: CTA b/l, good air entry b/l, no wheezing, no rhonchi, no rales, without accessory muscle use and no intercostal retractions  Cardiovascular: RRR, normal S1S2, no M/R/G  Gastrointestinal: soft, NTND, no masses palpable, BS normal  Extremities: Warm, well perfused, pulses equal bilateral upper and lower extremities, no edema, no clubbing. Capillary refill <2 sec  Neurological: AAOx3, CN Grossly intact  Skin: Normal temperature, warm, dry    MEDICATIONS  (STANDING):  ascorbic acid 500 milliGRAM(s) Oral daily  aspirin enteric coated 81 milliGRAM(s) Oral daily  atorvastatin 40 milliGRAM(s) Oral at bedtime  carvedilol 6.25 milliGRAM(s) Oral every 12 hours  chlorhexidine 2% Cloths 1 Application(s) Topical daily  dextrose 50% Injectable 12.5 Gram(s) IV Push once  dextrose 50% Injectable 25 Gram(s) IV Push once  dextrose 50% Injectable 25 Gram(s) IV Push once  dextrose Oral Gel 15 Gram(s) Oral once  dextrose Oral Gel 15 Gram(s) Oral once  epoetin dinah (EPOGEN) Injectable 6000 Unit(s) IV Push <User Schedule>  glucagon  Injectable 1 milliGRAM(s) IntraMuscular once  lactulose Syrup 10 Gram(s) Oral daily  latanoprost 0.005% Ophthalmic Solution 1 Drop(s) Both EYES at bedtime  mirtazapine 15 milliGRAM(s) Oral at bedtime  multivitamin 1 Tablet(s) Oral daily  pantoprazole   Suspension 40 milliGRAM(s) Oral daily  polyethylene glycol 3350 17 Gram(s) Oral daily  prednisoLONE acetate 1% Suspension 1 Drop(s) Both EYES every 12 hours  senna 2 Tablet(s) Oral at bedtime    MEDICATIONS  (PRN):  acetaminophen     Tablet .. 650 milliGRAM(s) Oral every 6 hours PRN Temp greater or equal to 38C (100.4F), Mild Pain (1 - 3)  LORazepam   Injectable 1 milliGRAM(s) IV Push every 8 hours PRN seizure >2 minutes  midodrine. 7.5 milliGRAM(s) Oral <User Schedule> PRN PRIOR TO HEMODIALYSIS  ondansetron Injectable 4 milliGRAM(s) IV Push every 8 hours PRN Nausea and/or Vomiting  sodium chloride 0.9% Bolus. 100 milliLiter(s) IV Bolus every 5 minutes PRN SBP LESS THAN or EQUAL to 90 mmHg      Allergies    No Known Allergies    Intolerances        LABS:                        11.6   3.87  )-----------( 173      ( 07 Jul 2024 06:11 )             38.2     07-07    136  |  96<L>  |  34<H>  ----------------------------<  52<LL>  4.9   |  23  |  5.05<H>    Ca    8.8      07 Jul 2024 06:11  Phos  4.6     07-07  Mg     2.50     07-07        Urinalysis Basic - ( 07 Jul 2024 06:11 )    Color: x / Appearance: x / SG: x / pH: x  Gluc: 52 mg/dL / Ketone: x  / Bili: x / Urobili: x   Blood: x / Protein: x / Nitrite: x   Leuk Esterase: x / RBC: x / WBC x   Sq Epi: x / Non Sq Epi: x / Bacteria: x        RADIOLOGY & ADDITIONAL TESTS:  Reviewed      ******************  Authored By: Elle Pool MD, PGY1  MS Teams Preferred  ******************

## 2024-07-07 NOTE — DISCHARGE NOTE PROVIDER - NSDCCPCAREPLAN_GEN_ALL_CORE_FT
PRINCIPAL DISCHARGE DIAGNOSIS  Diagnosis: AMS (altered mental status)  Assessment and Plan of Treatment:       SECONDARY DISCHARGE DIAGNOSES  Diagnosis: Toxic metabolic encephalopathy  Assessment and Plan of Treatment:     Diagnosis: Acute urinary tract infection  Assessment and Plan of Treatment:     Diagnosis: ESRD on dialysis  Assessment and Plan of Treatment:     Diagnosis: H/O: CVA (cerebrovascular accident)  Assessment and Plan of Treatment:     Diagnosis: Elevated troponin  Assessment and Plan of Treatment:     Diagnosis: Dementia  Assessment and Plan of Treatment:      PRINCIPAL DISCHARGE DIAGNOSIS  Diagnosis: Toxic metabolic encephalopathy  Assessment and Plan of Treatment: You came to the hospital after being found unresponsive at your extended care facility. An extensive work up with labs and imaging were done to see what the cause was. You were given antibiotics for a urinary tract infection and brain imaging and an EEG to look for seizures or any issues in the brain. You will follow up with outpatient neurology.      SECONDARY DISCHARGE DIAGNOSES  Diagnosis: Acute urinary tract infection  Assessment and Plan of Treatment: You were treated with antibiotics because bacteria was found in your urinary tract. You have finished the course of antibiotics while in the hospital.    Diagnosis: ESRD on dialysis  Assessment and Plan of Treatment: You get dialysis on Mondays, Wednesdays, and Fridays. You got dialysis while in the hospital on these days.    Diagnosis: H/O: CVA (cerebrovascular accident)  Assessment and Plan of Treatment: Your CT and MRI of your brain showed no acute or new changes.    Diagnosis: HTN (hypertension)  Assessment and Plan of Treatment: You have high blood pressure. Please follow up with your primary care provider    Diagnosis: Preventive measure  Assessment and Plan of Treatment: You are on a renal diet with vitamins and supplements     PRINCIPAL DISCHARGE DIAGNOSIS  Diagnosis: Toxic metabolic encephalopathy  Assessment and Plan of Treatment: You came to the hospital after being found unresponsive at your extended care facility. An extensive work up with labs and imaging were done to see what the cause was. You were given antibiotics for a urinary tract infection and brain imaging and an EEG to look for seizures or any issues in the brain. You will follow up with outpatient neurology.      SECONDARY DISCHARGE DIAGNOSES  Diagnosis: Acute urinary tract infection  Assessment and Plan of Treatment: You were treated with antibiotics because bacteria was found in your urinary tract. You have finished the course of antibiotics while in the hospital.    Diagnosis: ESRD on dialysis  Assessment and Plan of Treatment: You get dialysis on Mondays, Wednesdays, and Fridays. You got dialysis while in the hospital on these days.    Diagnosis: H/O: CVA (cerebrovascular accident)  Assessment and Plan of Treatment: Your CT and MRI of your brain showed no acute or new changes.    Diagnosis: HTN (hypertension)  Assessment and Plan of Treatment: You have high blood pressure. Please follow up with your primary care provider    Diagnosis: Preventive measure  Assessment and Plan of Treatment: You are on a renal diet with vitamins and supplements    Diagnosis: CAD (coronary artery disease)  Assessment and Plan of Treatment:      PRINCIPAL DISCHARGE DIAGNOSIS  Diagnosis: Toxic metabolic encephalopathy  Assessment and Plan of Treatment: You came to the hospital after being found unresponsive at your extended care facility. An extensive work up with labs and imaging were done to see what the cause was. Your mental status improved after antibiotics for a urinary tract infection and urinary catheterization. Brain imaging and an EEG were performed, but were negative for any new strokes or seizure-like activity. You will need follow up with outpatient neurology.  If you develop confusion, lethargy, or seizures, please return to the ED.      SECONDARY DISCHARGE DIAGNOSES  Diagnosis: ESRD on dialysis  Assessment and Plan of Treatment: You get dialysis on Mondays, Wednesdays, and Fridays. You got dialysis while in the hospital on these days and the Nephrologists gave you a medication called Epoetin with dialysis to help with your blood counts.   Please continue with your regularly scheduled dialysis and follow up with your kidney doctors.    Diagnosis: H/O: CVA (cerebrovascular accident)  Assessment and Plan of Treatment: Your CT and MRI of your brain showed no acute or new changes.    Diagnosis: Acute urinary tract infection  Assessment and Plan of Treatment: You were treated with antibiotics because bacteria was found in your urinary tract. You have finished the course of antibiotics while in the hospital and by the time of discharge, there was no evidence of any residual UTI. If you develop abdominal pain, pain with urination, fevers, chills, or confusion, please seek medical attention.    Diagnosis: HTN (hypertension)  Assessment and Plan of Treatment: You have high blood pressure. Please follow up with your PCP and your nephrologists. Please attend your scheduled dialysis sessions    Diagnosis: Preventive measure  Assessment and Plan of Treatment: You are on a renal diet with vitamins and supplements    Diagnosis: CAD (coronary artery disease)  Assessment and Plan of Treatment:

## 2024-07-07 NOTE — CHART NOTE - NSCHARTNOTEFT_GEN_A_CORE
EEG with mild b/l temporal focal cerebral dysfunction. Would recommend obtaining MRI brain w/wo with epilepsy protocol to further evaluate.     D/w neurology attending EEG with mild b/l temporal focal cerebral dysfunction (slowing).   Would recommend obtaining MRI brain w/wo with epilepsy protocol to further evaluate, while pt is still admitted.  Pt can then follow with general neurology @ [facility] at [address] [place] [address], [address] [place], [phone].  D/w neurology attending. No

## 2024-07-07 NOTE — PROVIDER CONTACT NOTE (HYPOGLYCEMIA EVENT) - NS PROVIDER CONTACT ASSESS-HYPO
Patient repeat fingerstick is 76. Value is not showing up on EMR.  MD Pool aware and notified. 
Patient assessment complete. Patient refusing to eat meals throughout day.
Patient assessment complete. patient asymptomatic. patient assisted with breakfast.

## 2024-07-07 NOTE — EEG REPORT - NS EEG TEXT BOX
JAMES PATRICK [id number]     Study Date: 7/6/24 13:19 - 7/7/24 08:00  Duration: 18 hr 19 min  --------------------------------------------------------------------------------------------------  History:  CC/ HPI Patient is a 69y old  Male who presents with a chief complaint of AMS (07 Jul 2024 11:10)    MEDICATIONS  (STANDING):  ascorbic acid 500 milliGRAM(s) Oral daily  aspirin enteric coated 81 milliGRAM(s) Oral daily  atorvastatin 40 milliGRAM(s) Oral at bedtime  carvedilol 6.25 milliGRAM(s) Oral every 12 hours  chlorhexidine 2% Cloths 1 Application(s) Topical daily  dextrose 50% Injectable 25 Gram(s) IV Push once  dextrose 50% Injectable 25 Gram(s) IV Push once  dextrose 50% Injectable 12.5 Gram(s) IV Push once  dextrose Oral Gel 15 Gram(s) Oral once  epoetin dinah (EPOGEN) Injectable 6000 Unit(s) IV Push <User Schedule>  glucagon  Injectable 1 milliGRAM(s) IntraMuscular once  lactulose Syrup 10 Gram(s) Oral daily  latanoprost 0.005% Ophthalmic Solution 1 Drop(s) Both EYES at bedtime  mirtazapine 15 milliGRAM(s) Oral at bedtime  multivitamin 1 Tablet(s) Oral daily  pantoprazole   Suspension 40 milliGRAM(s) Oral daily  polyethylene glycol 3350 17 Gram(s) Oral daily  prednisoLONE acetate 1% Suspension 1 Drop(s) Both EYES every 12 hours  senna 2 Tablet(s) Oral at bedtime    --------------------------------------------------------------------------------------------------  Study Interpretation:    [[[Abbreviation Key:  PDR=alpha rhythm/posterior dominant rhythm. A-P=anterior posterior.  Amplitude: ‘very low’:<20; ‘low’:20-49; ‘medium’:; ‘high’:>150uV.  Persistence for periodic/rhythmic patterns (% of epoch) ‘rare’:<1%; ‘occasional’:1-10%; ‘frequent’:10-50%; ‘abundant’:50-90%; ‘continuous’:>90%.  Persistence for sporadic discharges: ‘rare’:<1/hr; ‘occasional’:1/min-1/hr; ‘frequent’:>1/min; ‘abundant’:>1/10 sec.  RPP=rhythmic and periodic patterns; GRDA=generalized rhythmic delta activity; FIRDA=frontal intermittent GRDA; LRDA=lateralized rhythmic delta activity; TIRDA=temporal intermittent rhythmic delta activity;  LPD=PLED=lateralized periodic discharges; GPD=generalized periodic discharges; BIPDs =bilateral independent periodic discharges; Mf=multifocal; SIRPDs=stimulus induced rhythmic, periodic, or ictal appearing discharges; BIRDs=brief potentially ictal rhythmic discharges >4 Hz, lasting .5-10s; PFA (paroxysmal bursts >13 Hz or =8 Hz <10s).  Modifiers: +F=with fast component; +S=with spike component; +R=with rhythmic component.  S-B=burst suppression pattern.  Max=maximal. N1-drowsy; N2-stage II sleep; N3-slow wave sleep. SSS/BETS=small sharp spikes/benign epileptiform transients of sleep. HV=hyperventilation; PS=photic stimulation]]]    Daily EEG Visual Analysis    FINDINGS:      Background:  Continuity: Continuous  Symmetry: Symmetric  Posterior dominant rhythm (PDR): 8.5-9 Hz, reactive to eye closure. Symmetric low-amplitude frontal beta in wakefulness.  Voltage: Normal  Anterior-Posterior Gradient: Present  Other background findings: None  Breach: Absent    Background Slowing:  Generalized slowing: None  Focal slowing: Rare bilateral independent temporal focal polymorphic delta slowing in drowsiness    State Changes:   Drowsiness is characterized by fragmentation, attenuation, and slowing of the background activity.  Stage 2 sleep is characterized by symmetric K complexes and sleep spindles.     Interictal Findings:  None    Electrographic and Electroclinical seizures:  None    Other Clinical Events:  None    Activation Procedures:   Hyperventilation is not performed.    Photic stimulation is performed and does not elicit any abnormalities.      Artifacts:  Intermittent myogenic and movement artifacts are present.    Single-lead EKG: Regular rhythm, occasional PACs.    EEG Classification / Summary:  Abnormal EEG in the awake, drowsy, and asleep states.   Rare bilateral temporal focal slowing in drowsiness.  No epileptiform abnormalities are captured.   No seizures.    Clinical Impression:  Mild bilateral temporal focal cerebral dysfunction can be structural or functional in etiology.  No epileptiform abnormalities or seizures.          -------------------------------------------------------------------------------------------------------    Amy Valladares MD  Attending Physician, [facility]    -------------------------------------------------------------------------------------------------------    To reach EEG technologist:  Please use the pager number for the appropriate hospital or contact the .  At [facility] - Pager #: [phone]    To reach EEG-reading physician:  [facility] EEG Reading Room Phone #: [phone]  Epilepsy Answering Service after 5PM and before 8:30AM: Phone #: [phone]     JAMES PATRICK [id number]     Study Date: 7/6/24 13:19 - 7/7/24 14:29  Duration: 24 hr 47 min  --------------------------------------------------------------------------------------------------  History:  CC/ HPI Patient is a 69y old  Male who presents with a chief complaint of AMS (07 Jul 2024 11:10)    MEDICATIONS  (STANDING):  ascorbic acid 500 milliGRAM(s) Oral daily  aspirin enteric coated 81 milliGRAM(s) Oral daily  atorvastatin 40 milliGRAM(s) Oral at bedtime  carvedilol 6.25 milliGRAM(s) Oral every 12 hours  chlorhexidine 2% Cloths 1 Application(s) Topical daily  dextrose 50% Injectable 25 Gram(s) IV Push once  dextrose 50% Injectable 25 Gram(s) IV Push once  dextrose 50% Injectable 12.5 Gram(s) IV Push once  dextrose Oral Gel 15 Gram(s) Oral once  epoetin dinah (EPOGEN) Injectable 6000 Unit(s) IV Push <User Schedule>  glucagon  Injectable 1 milliGRAM(s) IntraMuscular once  lactulose Syrup 10 Gram(s) Oral daily  latanoprost 0.005% Ophthalmic Solution 1 Drop(s) Both EYES at bedtime  mirtazapine 15 milliGRAM(s) Oral at bedtime  multivitamin 1 Tablet(s) Oral daily  pantoprazole   Suspension 40 milliGRAM(s) Oral daily  polyethylene glycol 3350 17 Gram(s) Oral daily  prednisoLONE acetate 1% Suspension 1 Drop(s) Both EYES every 12 hours  senna 2 Tablet(s) Oral at bedtime    --------------------------------------------------------------------------------------------------  Study Interpretation:    [[[Abbreviation Key:  PDR=alpha rhythm/posterior dominant rhythm. A-P=anterior posterior.  Amplitude: ‘very low’:<20; ‘low’:20-49; ‘medium’:; ‘high’:>150uV.  Persistence for periodic/rhythmic patterns (% of epoch) ‘rare’:<1%; ‘occasional’:1-10%; ‘frequent’:10-50%; ‘abundant’:50-90%; ‘continuous’:>90%.  Persistence for sporadic discharges: ‘rare’:<1/hr; ‘occasional’:1/min-1/hr; ‘frequent’:>1/min; ‘abundant’:>1/10 sec.  RPP=rhythmic and periodic patterns; GRDA=generalized rhythmic delta activity; FIRDA=frontal intermittent GRDA; LRDA=lateralized rhythmic delta activity; TIRDA=temporal intermittent rhythmic delta activity;  LPD=PLED=lateralized periodic discharges; GPD=generalized periodic discharges; BIPDs =bilateral independent periodic discharges; Mf=multifocal; SIRPDs=stimulus induced rhythmic, periodic, or ictal appearing discharges; BIRDs=brief potentially ictal rhythmic discharges >4 Hz, lasting .5-10s; PFA (paroxysmal bursts >13 Hz or =8 Hz <10s).  Modifiers: +F=with fast component; +S=with spike component; +R=with rhythmic component.  S-B=burst suppression pattern.  Max=maximal. N1-drowsy; N2-stage II sleep; N3-slow wave sleep. SSS/BETS=small sharp spikes/benign epileptiform transients of sleep. HV=hyperventilation; PS=photic stimulation]]]    Daily EEG Visual Analysis    FINDINGS:      Background:  Continuity: Continuous  Symmetry: Symmetric  Posterior dominant rhythm (PDR): 8.5-9 Hz, reactive to eye closure. Symmetric low-amplitude frontal beta in wakefulness.  Voltage: Normal  Anterior-Posterior Gradient: Present  Other background findings: None  Breach: Absent    Background Slowing:  Generalized slowing: None  Focal slowing: Rare bilateral independent temporal focal polymorphic delta slowing in drowsiness    State Changes:   Drowsiness is characterized by fragmentation, attenuation, and slowing of the background activity.  Stage 2 sleep is characterized by symmetric K complexes and sleep spindles.     Interictal Findings:  None    Electrographic and Electroclinical seizures:  None    Other Clinical Events:  None    Activation Procedures:   Hyperventilation is not performed.    Photic stimulation is performed and does not elicit any abnormalities.      Artifacts:  Intermittent myogenic and movement artifacts are present.    Single-lead EKG: Regular rhythm, occasional PACs.    EEG Classification / Summary:  Abnormal EEG in the awake, drowsy, and asleep states.   Rare bilateral temporal focal slowing in drowsiness.  No epileptiform abnormalities are captured.   No seizures.    Clinical Impression:  Mild bilateral temporal focal cerebral dysfunction can be structural or functional in etiology.  No epileptiform abnormalities or seizures.          -------------------------------------------------------------------------------------------------------    Amy Valladares MD  Attending Physician, [facility]    -------------------------------------------------------------------------------------------------------    To reach EEG technologist:  Please use the pager number for the appropriate hospital or contact the .  At [facility] - Pager #: [phone]    To reach EEG-reading physician:  [facility] EEG Reading Room Phone #: [phone]  Epilepsy Answering Service after 5PM and before 8:30AM: Phone #: [phone]

## 2024-07-07 NOTE — PROVIDER CONTACT NOTE (CRITICAL VALUE NOTIFICATION) - ACTION/TREATMENT ORDERED:
MD aware and notified. Patient ordered glucose oral glucose.
MD notified, Labs repeated
will administer medications per MD's order and continue monitoring.

## 2024-07-07 NOTE — PROVIDER CONTACT NOTE (CRITICAL VALUE NOTIFICATION) - BACKGROUND
pt is admitted with altered mental status
Admitted for altered mental status
Patient admitted for altered mental status

## 2024-07-07 NOTE — PROGRESS NOTE ADULT - PROBLEM SELECTOR PLAN 1
·  Plan: -TME likely 2/2 UTI  -given gliosis, biting of lower tongue ordered vEEG r/o seizure like activity  -neuro checks ordered  -ativan 1 mg prn seizure >2 minutes  -CT head w/ old gliosis, no acute ICH, masses, or strokes. Neuro exam UE 4/5 and a/o times 1-2 currently  -POC glucose checks  -f/u blood cultures (blood, urine, MRSA swab sent and waiting for results): if febrile do vancomycin and zosyn (recent MRSA bacteremia).  -preliminary EEG report: no seizure activity

## 2024-07-07 NOTE — PROGRESS NOTE ADULT - ASSESSMENT
69 year old M hx of ESRD on HD left AVF MWFr, CVA w/ gliosis, BKA/AKA functional quadriplegia, CAD, HTN, HLD, recent admission for MRSA bacteremia here w/ not responding at his extended care facility.    ESRD:  On HD TIW via [facility].  Schedule is MWF. Consent obtained and charted.  s/p HD 7/5 UF 1576  hd ON 7/8/24  - Renal diet.    Unresponsiveness:  Verbally responsive  work up per team  possible sec to sepsis     Hyperkalemia:  K wnl  EKG not consistent with Hyperkalemia.  resolved  hd per schedule  - Follow up BMP.    anemia  fluctuating; above goal   epo with hd as needed   monitor

## 2024-07-07 NOTE — DISCHARGE NOTE PROVIDER - HOSPITAL COURSE
HPI:  69 year old M hx of ESRD on HD left AVF MWFr, CVA w/ gliosis, BKA/AKA functional quadriplegia, CAD, HTN, HLD, recent admission for MRSA bacteremia here w/ not responding at his extended care facility. He had small amount of blood in his mouth as well, small bite on lower lip. He had straight cath done in ED, then became responsive again. Urinalysis was positive for UTI, given ceftriaxone 1 gram. CT head showed chronic gliosis. EKG showed nonspecific TWI, had troponin elevation, though ckmb and ck was wnl. Denies chest pain. He is a/o times 1-2 (knows name, and he is in hospital), denying any complaints. Nephrology was made aware, getting HD.      Hospital Course: Pt came in for AMS and unresponsiveness at extended care facility. Patient was found to have WBCs in their UA but no bacteria growth. Blood cultures also had no growth. Patient was treated with ceftriaxone and remained afebrile throughout hospital course. CT head w/ old gliosis, no acute ICH, masses, or strokes. Patient was followed by nephrology and nutrition. Patient had poor PO intake so was started on Nepro supplement and required glucose. Patient had an EEG to rule out seizure activity for the cause of AMS which showed mild bilateral temporal focal cerebral dysfunction that could be structural or functional in etiology but no epileptiform abnormalities or seizures. EKG showed long QTc (Diagnosis Line *** Critical Test Result: Long QTc  Normal sinus rhythm  Left ventricular hypertrophy with repolarization abnormality ( Faizan product , Romhilt-Zhou )  Prolonged QT  Abnormal ECG)  Echo showed CONCLUSIONS:      1. Left ventricular cavity is normal in size. Left ventricular systolic function is moderately decreased with an ejection fraction visually estimated at 35 to 40 %.   2. Mild left ventricular hypertrophy.   3. Normal right ventricular cavity size and normal right ventricular systolic function.   4. Structurally normal mitral valve with normal leaflet excursion.   5. Trileaflet aortic valve with reduced systolic excursion. There is calcification of the aortic valve leaflets.   6. The peak transaortic velocity is 1.54 m/s, peak transaortic gradient is 9.5 mmHg and mean transaortic gradient is 5.0 mmHg with an LVOT/aortic valve VTI ratio of 0.43.   7. Mild aortic regurgitation.   8. Estimated pulmonary artery systolic pressure is 23 mmHg.   9. The inferior vena cava is normal in size (normal <2.1cm) with normal inspiratory collapse (normal >50%) consistent with normal right atrial pressure (~3, range 0-5mmHg).  10. No pericardial effusion seen.  11. Left pleural effusion noted.  12. Compared to the transesophageal echocardiogram performed on 4/30/2024, there have been no significant interval changes.    ________________________________________________________________________________________  FINDINGS:     Left Ventricle:  The left ventricular cavity is normal in size. Left ventricular systolic function is moderately decreased with an ejection fraction visually estimated at 35 to 40%. Mild left ventricular hypertrophy.     Right Ventricle:  The right ventricular cavity is normal in size and right ventricular systolic function is normal. Tricuspid annular plane systolic excursion (TAPSE) is 1.4 cm (normal >=1.7 cm).     Left Atrium:  The left atrium is normal in size with an indexed volume of 37.37 ml/m².     Right Atrium:  The right atrium is normal in size with an indexed volume of 18.28 ml/m².     Aortic Valve:  The aortic valve appears trileaflet with reduced systolic excursion. There is calcification of the aortic valve leaflets. The peak transaortic velocity is 1.54 m/s, peak transaortic gradient is 9.5 mmHg and mean transaortic gradient is 5.0 mmHg with an LVOT/aortic valve VTI ratio of 0.43. There is mild aortic regurgitation.     Mitral Valve:  Structurally normal mitral valve with normal leaflet excursion. There is calcification of the mitral valve annulus. There is trace mitral regurgitation.     Tricuspid Valve:  Structurally normal tricuspid valve with normal leaflet excursion. There is mild tricuspid regurgitation. Estimated pulmonary artery systolic pressure is 23 mmHg.     Pulmonic Valve:  Structurally normal pulmonic valve with normal leaflet excursion.     Aorta:  The aortic root at the sinuses of Valsalva is normal in size, measuring 2.80 cm (indexed 1.76 cm/m²). The ascending aorta diameter is normal in size, measuring 2.70 cm (indexed 1.70 cm/m²).     Pericardium:  No pericardial effusion seen.     Pleura:  Left pleural effusion noted.     Systemic Veins:  The inferior vena cava is normal in size (normal <2.1cm) with normal inspiratory collapse (normal >50%) consistent with normal right atrial pressure (~3, range 0-5mmHg).        Important Medication Changes and Reason: ceftriaxone for aseptic UTI    Active or Pending Issues Requiring Follow-up:    Advanced Directives:   [ ] Full code  [ ] DNR  [ ] Hospice    Discharge Diagnoses: ESRD on HD left AVF MWFr, CVA w/ gliosis, BKA/AKA fucntional quadriplegia, CAD, HTN, HLD, recent admission for MRSA bacteremia (April 27, 2024)         HPI:  69 year old M hx of ESRD on HD left AVF MWFr, CVA w/ gliosis, BKA/AKA functional quadriplegia, CAD, HTN, HLD, recent admission for MRSA bacteremia here w/ not responding at his extended care facility. He had small amount of blood in his mouth as well, small bite on lower lip. He had straight cath done in ED, then became responsive again. Urinalysis was positive for UTI, given ceftriaxone 1 gram. CT head showed chronic gliosis. EKG showed nonspecific TWI, had troponin elevation, though ckmb and ck was wnl. Denies chest pain. He is a/o times 1-2 (knows name, and he is in hospital), denying any complaints. Nephrology was made aware, getting HD.      Hospital Course: Pt came in for AMS and unresponsiveness at extended care facility. Patient was found to have WBCs in their UA but no bacteria growth. Blood cultures also had no growth. Patient was treated with ceftriaxone and remained afebrile throughout hospital course. CT head w/ old gliosis, no acute ICH, masses, or strokes. Patient was followed by nephrology and nutrition. Patient had poor PO intake so was started on Nepro supplement and required glucose. Patient had an EEG to rule out seizure activity for the cause of AMS which showed mild bilateral temporal focal cerebral dysfunction that could be structural or functional in etiology but no epileptiform abnormalities or seizures. Neurology had no further recs.   EKG showed long QTc (Diagnosis Line *** Critical Test Result: Long QTc  Normal sinus rhythm  Left ventricular hypertrophy with repolarization abnormality ( Faizan product , Romhilt-Zhou )  Prolonged QT  Abnormal ECG)    Echo showed CONCLUSIONS:      1. Left ventricular cavity is normal in size. Left ventricular systolic function is moderately decreased with an ejection fraction visually estimated at 35 to 40 %.   2. Mild left ventricular hypertrophy.   3. Normal right ventricular cavity size and normal right ventricular systolic function.   4. Structurally normal mitral valve with normal leaflet excursion.   5. Trileaflet aortic valve with reduced systolic excursion. There is calcification of the aortic valve leaflets.   6. The peak transaortic velocity is 1.54 m/s, peak transaortic gradient is 9.5 mmHg and mean transaortic gradient is 5.0 mmHg with an LVOT/aortic valve VTI ratio of 0.43.   7. Mild aortic regurgitation.   8. Estimated pulmonary artery systolic pressure is 23 mmHg.   9. The inferior vena cava is normal in size (normal <2.1cm) with normal inspiratory collapse (normal >50%) consistent with normal right atrial pressure (~3, range 0-5mmHg).  10. No pericardial effusion seen.  11. Left pleural effusion noted.  12. Compared to the transesophageal echocardiogram performed on 4/30/2024, there have been no significant interval changes.    ________________________________________________________________________________________  FINDINGS:     Left Ventricle:  The left ventricular cavity is normal in size. Left ventricular systolic function is moderately decreased with an ejection fraction visually estimated at 35 to 40%. Mild left ventricular hypertrophy.     Right Ventricle:  The right ventricular cavity is normal in size and right ventricular systolic function is normal. Tricuspid annular plane systolic excursion (TAPSE) is 1.4 cm (normal >=1.7 cm).     Left Atrium:  The left atrium is normal in size with an indexed volume of 37.37 ml/m².     Right Atrium:  The right atrium is normal in size with an indexed volume of 18.28 ml/m².     Aortic Valve:  The aortic valve appears trileaflet with reduced systolic excursion. There is calcification of the aortic valve leaflets. The peak transaortic velocity is 1.54 m/s, peak transaortic gradient is 9.5 mmHg and mean transaortic gradient is 5.0 mmHg with an LVOT/aortic valve VTI ratio of 0.43. There is mild aortic regurgitation.     Mitral Valve:  Structurally normal mitral valve with normal leaflet excursion. There is calcification of the mitral valve annulus. There is trace mitral regurgitation.     Tricuspid Valve:  Structurally normal tricuspid valve with normal leaflet excursion. There is mild tricuspid regurgitation. Estimated pulmonary artery systolic pressure is 23 mmHg.     Pulmonic Valve:  Structurally normal pulmonic valve with normal leaflet excursion.     Aorta:  The aortic root at the sinuses of Valsalva is normal in size, measuring 2.80 cm (indexed 1.76 cm/m²). The ascending aorta diameter is normal in size, measuring 2.70 cm (indexed 1.70 cm/m²).     Pericardium:  No pericardial effusion seen.     Pleura:  Left pleural effusion noted.     Systemic Veins:  The inferior vena cava is normal in size (normal <2.1cm) with normal inspiratory collapse (normal >50%) consistent with normal right atrial pressure (~3, range 0-5mmHg).        Important Medication Changes and Reason: ceftriaxone for aseptic UTI (course was finished in the hospital)    Active or Pending Issues Requiring Follow-up: HD MWFr    Advanced Directives:   [ ] Full code  [ ] DNR  [ ] Hospice    Discharge Diagnoses: ESRD on HD left AVF MWFr, CVA w/ gliosis, BKA/AKA fucntional quadriplegia, CAD, HTN, HLD, recent admission for MRSA bacteremia (April 27, 2024)         HPI:  69 year old M hx of ESRD on HD left AVF MWFr, CVA w/ gliosis, BKA/AKA functional quadriplegia, CAD, HTN, HLD, recent admission for MRSA bacteremia here w/ not responding at his extended care facility. He had small amount of blood in his mouth as well, small bite on lower lip. He had straight cath done in ED, then became responsive again. Urinalysis was positive for UTI, given ceftriaxone 1 gram. CT head showed chronic gliosis. EKG showed nonspecific TWI, had troponin elevation, though ckmb and ck was wnl. Denies chest pain. He is a/o times 1-2 (knows name, and he is in hospital), denying any complaints. Nephrology was made aware, getting HD.      Hospital Course: Pt came in for AMS and unresponsiveness at extended care facility. Patient was found to have WBCs in their UA but no bacteria growth. Blood cultures also had no growth. Patient was treated with ceftriaxone and remained afebrile throughout hospital course. CT head w/ old gliosis, no acute ICH, masses, or strokes. Patient was followed by nephrology and nutrition. Patient had poor PO intake so was started on Nepro supplement 2x/day and required glucose at times for episodes of hypoglycemia. Per patient's niece this is pt's normal baseline as he does not eat much at his extended care facility either. Patient had an EEG to rule out seizure activity for the cause of AMS which showed mild bilateral temporal focal cerebral dysfunction that could be structural or functional in etiology but no epileptiform abnormalities or seizures. A Brain MRI was found that showed no acute intracranial hemorrhage or ischemia. There is an unchanged left-sided schwannoma and chronic ischemia.     Important Medication Changes and Reason: subQ heparin q12 for DVT prophylaxis    Active or Pending Issues Requiring Follow-up: HD MWFr    Advanced Directives:   [X ] Full code  [ ] DNR  [ ] Hospice    Discharge Diagnoses: ESRD on HD left AVF MWFr, CVA w/ gliosis, BKA/AKA fucntional quadriplegia, CAD, HTN, HLD, recent admission for MRSA bacteremia (April 27, 2024)         HPI:  69 year old M hx of ESRD on HD left AVF MWFr, CVA w/ gliosis, BKA/AKA functional quadriplegia, CAD, HTN, HLD, recent admission for MRSA bacteremia here w/ not responding at his extended care facility. He had small amount of blood in his mouth as well, small bite on lower lip. He had straight cath done in ED, then became responsive again. Urinalysis was positive for UTI, given ceftriaxone 1 gram. CT head showed chronic gliosis. EKG showed nonspecific TWI, had troponin elevation, though ckmb and ck was wnl. Denies chest pain. He is a/o times 1-2 (knows name, and he is in hospital), denying any complaints. Nephrology was made aware, getting HD.      Hospital Course: Pt came in for AMS and unresponsiveness at extended care facility. Patient was found to have WBCs in their UA but no bacteria growth. Blood cultures also had no growth. Patient was treated with ceftriaxone and remained afebrile throughout hospital course. CT head w/ old gliosis, no acute ICH, masses, or strokes. Patient was followed by nephrology and nutrition. Patient had poor PO intake so was started on Nepro supplement 2x/day and required glucose at times for episodes of hypoglycemia. Per patient's niece this is pt's normal baseline as he does not eat much at his extended care facility either. Patient had an EEG to rule out seizure activity for the cause of AMS which showed mild bilateral temporal focal cerebral dysfunction that could be structural or functional in etiology but no epileptiform abnormalities or seizures. A Brain MRI was found that showed no acute intracranial hemorrhage or ischemia. There is an unchanged left-sided schwannoma and chronic ischemia. Pt was sent back to extended care facility with outpatient neurology follow up.    Important Medication Changes and Reason: subQ heparin q12 for DVT prophylaxis    Active or Pending Issues Requiring Follow-up: HD MWFr    Advanced Directives:   [X ] Full code  [ ] DNR  [ ] Hospice    Discharge Diagnoses: encephalopathy, ESRD on HD left AVF MWFr, CVA w/ gliosis, BKA/AKA fucntional quadriplegia, CAD, HTN, HLD, recent admission for MRSA bacteremia (April 27, 2024)         HPI:  69 year old M hx of ESRD on HD left AVF MWFr, CVA w/ gliosis, BKA/AKA functional quadriplegia, CAD, HTN, HLD, recent admission for MRSA bacteremia here w/ not responding at his extended care facility. He had small amount of blood in his mouth as well, small bite on lower lip. He had straight cath done in ED, then became responsive again. Urinalysis was positive for UTI, given ceftriaxone 1 gram. CT head showed chronic gliosis. EKG showed nonspecific TWI, had troponin elevation, though ckmb and ck was wnl. Denies chest pain. He is a/o times 1-2 (knows name, and he is in hospital), denying any complaints. Nephrology was made aware, getting HD.      Hospital Course: Pt came in for AMS and unresponsiveness at [facility] care facility. Patient was found to have WBCs in their UA but no bacteria growth. Blood cultures also had no growth. Patient was treated with ceftriaxone and remained afebrile throughout hospital course. CT head w/ old gliosis, no acute ICH, masses, or strokes. Patient was followed by nephrology and nutrition. Patient had poor PO intake so was started on Nepro supplement 2x/day and required glucose at times for episodes of hypoglycemia. Per patient's niece this is pt's normal baseline as he does not eat much at his extended care facility either. Patient had an EEG to rule out seizure activity for the cause of AMS which showed mild bilateral temporal focal cerebral dysfunction that could be structural or functional in etiology but no epileptiform abnormalities or seizures. A Brain MRI was found that showed no acute intracranial hemorrhage or ischemia. There is an unchanged left-sided schwannoma and chronic ischemia. Pt was sent back to [facility] care facility with outpatient neurology follow up.    By the time of discharge, the patient's mental status was at baseline and hemodynamically stable.    Important Medication Changes and Reason:   Epo 6000U MWF intradialysis    Active or Pending Issues Requiring Follow-up:   HD MWF  Neurology     Advanced Directives:   [X ] Full code  [ ] DNR  [ ] Hospice    Discharge Diagnoses: encephalopathy, ESRD on HD left AVF MWFr, CVA w/ gliosis, BKA/AKA fucntional quadriplegia, CAD, HTN, HLD, recent admission for MRSA bacteremia (April 27, 2024)         HPI:  69 year old M hx of ESRD on HD left AVF MWFr, CVA w/ gliosis, BKA/AKA functional quadriplegia, CAD, HTN, HLD, recent admission for MRSA bacteremia here w/ not responding at his extended care facility. He had small amount of blood in his mouth as well, small bite on lower lip. He had straight cath done in ED, then became responsive again. Urinalysis was positive for UTI, given ceftriaxone 1 gram. CT head showed chronic gliosis. EKG showed nonspecific TWI, had troponin elevation, though ckmb and ck was wnl. Denies chest pain. He is a/o times 1-2 (knows name, and he is in hospital), denying any complaints. Nephrology was made aware, getting HD.      Hospital Course: Pt came in for AMS and unresponsiveness at [facility] care facility. Patient was found to have WBCs in their UA but no bacteria growth. Blood cultures also had no growth. Patient was treated with ceftriaxone and remained afebrile throughout hospital course. CT head w/ old gliosis, no acute ICH, masses, or strokes. Patient was followed by nephrology and nutrition. Patient had poor PO intake so was started on Nepro supplement 2x/day and required glucose at times for episodes of hypoglycemia. Per patient's niece this is pt's normal baseline as he does not eat much at his extended care facility either. Patient had an EEG to rule out seizure activity for the cause of AMS which showed mild bilateral temporal focal cerebral dysfunction that could be structural or functional in etiology but no epileptiform abnormalities or seizures. A Brain MRI was found that showed chronic changes and no acute intracranial hemorrhage or ischemia. There is an unchanged left-sided schwannoma and chronic ischemia. Pt was sent back to [facility] with outpatient neurology follow up.    By the time of discharge, the patient's mental status was at baseline and hemodynamically stable.    Important Medication Changes and Reason:   Epo 6000U MWF intradialysis    Active or Pending Issues Requiring Follow-up:   HD MWF  Neurology     Advanced Directives:   [X ] Full code  [ ] DNR  [ ] Hospice    Discharge Diagnoses:   Encephalopathy  ESRD on HD left AVF MWFr  CVA w/ gliosis, BKA/AKA   Functional quadriplegia  CAD  HTN  HLD

## 2024-07-07 NOTE — DISCHARGE NOTE PROVIDER - NSFOLLOWUPCLINICS_GEN_ALL_ED_FT
[facility] General Internal Medicine  General Internal Medicine  [address] [place] [address]  Phone: [phone]  Fax:      [facility] General Internal Medicine  General Internal Medicine  [address] [place] [id number]  Phone: [phone]  Fax:     [facility]  Neurology Autoimmune Encephalitis  [address] [place] [address]  Phone: [phone]  Fax: [phone]

## 2024-07-07 NOTE — PROVIDER CONTACT NOTE (HYPOGLYCEMIA EVENT) - NS PROVIDER CONTACT NOTE-TREATMENT-HYPO
Dextrose 50% 12.5 Grams IV Push/4 oz Fruit Juice...
Dextrose 50% 25 Grams IV Push
Dextrose 50% 12.5 Grams IV Push
Patient repeat was 76./Other (Specify)

## 2024-07-07 NOTE — PROGRESS NOTE ADULT - PROBLEM SELECTOR PLAN 7
Plan:  Fluid: 1 L restrict  E: cautious w/ ESRD  N: renal pureed diet (nutrition rec adding Nepro 1x/day and dysphagia screen-ordered)  D: hold heparin subQ DVT prophylaxis, monitor for bleeding (is already on eliquis), if no further signs can do DVT prophylaxis.

## 2024-07-07 NOTE — DISCHARGE NOTE PROVIDER - NSDCFUSCHEDAPPT_GEN_ALL_CORE_FT
Derick Stewart  [facility] [id number]  Scheduled Appointment: 07/23/2024    [facility] [id number]  Scheduled Appointment: 07/26/2024    [facility] [id number]  Scheduled Appointment: 08/26/2024

## 2024-07-07 NOTE — PROVIDER CONTACT NOTE (CRITICAL VALUE NOTIFICATION) - ASSESSMENT
Potassium Phos running through IV, glucometer glucose check 105 at 826 am
glucose serum-52
pt is alert and confused. not in any distress

## 2024-07-07 NOTE — DISCHARGE NOTE PROVIDER - NSDCMRMEDTOKEN_GEN_ALL_CORE_FT
ascorbic acid 500 mg oral capsule: 1 cap(s) orally once a day  aspirin 81 mg oral capsule: 1 cap(s) orally once a day  carvedilol 6.25 mg oral tablet: 1 tab(s) orally 2 times a day  centrum daily:   lactulose 10 g/15 mL oral syrup: 10 milliliter(s) orally once a day  latanoprost 0.005% ophthalmic emulsion: 1 drop(s) in each affected eye once a day (at bedtime) both eyes  Lipitor 40 mg oral tablet: 1 tab(s) orally once a day  midodrine: 7.5 milligram(s) three times a day before dialysis  mirtazapine 7.5 mg oral tablet: 1 tab(s) orally once a day (at bedtime)  ondansetron 4 mg oral tablet: 1 tab(s) orally every 8 hours for nausea/vomiting  Polyethylene Glycol 3350: once a day  prednisoLONE ophthalmic: 1% BID  Protonix 40 mg oral delayed release tablet: 1 tab(s) orally once a day  senna (sennosides) 8.6 mg oral tablet: 2 tab(s) orally once a day  Tylenol 325 mg oral capsule: 1 cap(s) orally every 6 hours as needed   ascorbic acid 500 mg oral capsule: 1 cap(s) orally once a day  aspirin 81 mg oral capsule: 1 cap(s) orally once a day  carvedilol 6.25 mg oral tablet: 1 tab(s) orally 2 times a day  centrum daily:   lactulose 10 g/15 mL oral syrup: 10 milliliter(s) orally once a day  latanoprost 0.005% ophthalmic emulsion: 1 drop(s) in each affected eye once a day (at bedtime) both eyes  Lipitor 40 mg oral tablet: 1 tab(s) orally once a day  midodrine: 7.5 milligram(s) orally 3 times a day three times a day before dialysis  mirtazapine 7.5 mg oral tablet: 1 tab(s) orally once a day (at bedtime)  ondansetron 4 mg oral tablet: 1 tab(s) orally every 8 hours for nausea/vomiting  Polyethylene Glycol 3350: 1 application once a day once a day  prednisoLONE ophthalmic: 1 application 2 times a day 1% BID  Protonix 40 mg oral delayed release tablet: 1 tab(s) orally once a day  senna (sennosides) 8.6 mg oral tablet: 2 tab(s) orally once a day  Tylenol 325 mg oral capsule: 1 cap(s) orally every 6 hours as needed   ascorbic acid 500 mg oral capsule: 1 cap(s) orally once a day  aspirin 81 mg oral capsule: 1 cap(s) orally once a day  carvedilol 6.25 mg oral tablet: 1 tab(s) orally 2 times a day  centrum daily:   epoetin dinah: 6,000 unit(s) intravenous 3 times a week 6000U Monday Wednesday Friday intradialysis  lactulose 10 g/15 mL oral syrup: 10 milliliter(s) orally once a day  latanoprost 0.005% ophthalmic emulsion: 1 drop(s) in each affected eye once a day (at bedtime) both eyes  Lipitor 40 mg oral tablet: 1 tab(s) orally once a day  midodrine: 7.5 milligram(s) orally 3 times a day three times a day before dialysis  mirtazapine 7.5 mg oral tablet: 1 tab(s) orally once a day (at bedtime)  ondansetron 4 mg oral tablet: 1 tab(s) orally every 8 hours for nausea/vomiting  Polyethylene Glycol 3350: 1 application once a day once a day  prednisoLONE ophthalmic: 1 application 2 times a day 1% BID  Protonix 40 mg oral delayed release tablet: 1 tab(s) orally once a day  senna (sennosides) 8.6 mg oral tablet: 2 tab(s) orally once a day  Tylenol 325 mg oral capsule: 1 cap(s) orally every 6 hours as needed

## 2024-07-07 NOTE — DISCHARGE NOTE PROVIDER - NSDCFUADDAPPT_GEN_ALL_CORE_FT
APPTS ARE READY TO BE MADE: [X] YES    Best Family or Patient Contact (if needed): niece (Ramonita: [phone])    Additional Information about above appointments (if needed):    1: Please make follow up appointment with Neuroscience at [address] [place] [address], [address] [place], [phone].    2:   3:     Other comments or requests:

## 2024-07-07 NOTE — PROGRESS NOTE ADULT - ATTENDING COMMENTS
69 year old M hx of ESRD on HD left AVF MWFr, CVA w/ gliosis, BKA/AKA functional quadriplegia, CAD, HTN, HLD, recent admission for MRSA bacteremia admitted for non responsiveness at facility. Now improved. S/p CTH notable for encephalomalacia and gliosis in the b/l occipital   lobes and cerebellum secondary to old infarctions and oderate periventricular white matter ischemia is noted. Also noted to have elevated Hst.     -Encephalopathy thought to be 2/2 UTI. However, pt noted to have recovery of MS after straight cath in the ED and urine cx neg (obtained prior to abx) which does not fit picture for UTI. Furthermore he has been afebrile and w/o leukocytosis. 7/3 blood cx NTD. Would dc ceftriaxone and monitor off abx.   -Noted elevated troponin levels and TWI on EKG. However CKMB and CK wnl and pt w/ ESRD. Low concern for ACS. C/w aspirin 81 mg , lipitor 40 mg and carvedilol 6.25 mg BID. Repeat EKG. Will obtain TTE   -Given gliosis, biting of lower tongue ordered vEEG. Too late to obtain prolactin level at this time. Neuro checks. Pt currently with no further episodes. Given no identifiable source of pts symptoms.  consulted  neuro , neuro agree with VEEG , and rec  if MS worsens, can obtain MRI brain.  Veeg still in progress , Prelim read : Mild bilateral temporal focal cerebral dysfunction can be structural or functional in etiology.  No epileptiform abnormalities or seizures.  Will f/u Final VEEG report   Neuro F/U   DC planning

## 2024-07-07 NOTE — PROVIDER CONTACT NOTE (HYPOGLYCEMIA EVENT) - NS PROVIDER CONTACT RECOMMEND-HYPO
Notified MD. MORAN aware and notified. 
Notify MD Jane Hawk. Promote oral intake of meals. Order Iv fluids due to low PO intake.
Notify MD Emma Hawk. Order IV fluids dut to patient's poor PO intake.
Patient also given dextrose 50% 12.5 grams iv push. MD aware and notified.

## 2024-07-08 LAB
CULTURE RESULTS: SIGNIFICANT CHANGE UP
CULTURE RESULTS: SIGNIFICANT CHANGE UP
GLUCOSE BLDC GLUCOMTR-MCNC: 132 MG/DL — HIGH (ref 70–99)
GLUCOSE BLDC GLUCOMTR-MCNC: 82 MG/DL — SIGNIFICANT CHANGE UP (ref 70–99)
GLUCOSE BLDC GLUCOMTR-MCNC: 99 MG/DL — SIGNIFICANT CHANGE UP (ref 70–99)
SPECIMEN SOURCE: SIGNIFICANT CHANGE UP
SPECIMEN SOURCE: SIGNIFICANT CHANGE UP

## 2024-07-08 PROCEDURE — 70553 MRI BRAIN STEM W/O & W/DYE: CPT | Mod: 26

## 2024-07-08 PROCEDURE — 99233 SBSQ HOSP IP/OBS HIGH 50: CPT | Mod: GC

## 2024-07-08 RX ORDER — HEPARIN SODIUM 50 [USP'U]/ML
5000 INJECTION, SOLUTION INTRAVENOUS EVERY 12 HOURS
Refills: 0 | Status: DISCONTINUED | OUTPATIENT
Start: 2024-07-08 | End: 2024-07-09

## 2024-07-08 RX ADMIN — CARVEDILOL PHOSPHATE 6.25 MILLIGRAM(S): 80 CAPSULE, EXTENDED RELEASE ORAL at 05:40

## 2024-07-08 RX ADMIN — Medication 1 DROP(S): at 17:58

## 2024-07-08 RX ADMIN — HEPARIN SODIUM 5000 UNIT(S): 50 INJECTION, SOLUTION INTRAVENOUS at 17:59

## 2024-07-08 RX ADMIN — Medication 1 TABLET(S): at 15:37

## 2024-07-08 RX ADMIN — POLYETHYLENE GLYCOL 3350 17 GRAM(S): 1 POWDER ORAL at 15:38

## 2024-07-08 RX ADMIN — Medication 1 APPLICATION(S): at 15:38

## 2024-07-08 RX ADMIN — Medication 1 DROP(S): at 05:39

## 2024-07-08 RX ADMIN — Medication 500 MILLIGRAM(S): at 15:37

## 2024-07-08 RX ADMIN — MIRTAZAPINE 15 MILLIGRAM(S): 15 TABLET, FILM COATED ORAL at 21:01

## 2024-07-08 RX ADMIN — LATANOPROST PF 1 DROP(S): 0.05 SOLUTION/ DROPS OPHTHALMIC at 21:01

## 2024-07-08 RX ADMIN — ATORVASTATIN CALCIUM 40 MILLIGRAM(S): 20 TABLET, FILM COATED ORAL at 21:01

## 2024-07-08 RX ADMIN — LACTULOSE 10 GRAM(S): 10 SOLUTION ORAL at 15:37

## 2024-07-08 RX ADMIN — Medication 2 TABLET(S): at 21:01

## 2024-07-08 RX ADMIN — CARVEDILOL PHOSPHATE 6.25 MILLIGRAM(S): 80 CAPSULE, EXTENDED RELEASE ORAL at 18:00

## 2024-07-08 RX ADMIN — ERYTHROPOIETIN 6000 UNIT(S): 4000 INJECTION, SOLUTION INTRAVENOUS; SUBCUTANEOUS at 13:10

## 2024-07-08 RX ADMIN — PANTOPRAZOLE SODIUM 40 MILLIGRAM(S): 40 INJECTION, POWDER, FOR SOLUTION INTRAVENOUS at 15:37

## 2024-07-08 RX ADMIN — ASPIRIN 81 MILLIGRAM(S): 325 TABLET, FILM COATED ORAL at 15:37

## 2024-07-08 NOTE — PROGRESS NOTE ADULT - ASSESSMENT
69 year old M hx of ESRD on HD left AVF MWFr, CVA w/ gliosis, BKA/AKA functional quadriplegia, CAD, HTN, HLD, recent admission for MRSA bacteremia here w/ not responding at his extended care facility.    ESRD:  On HD TIW via [facility].  Schedule is MWF. Consent obtained and charted.  s/p HD 7/5 UF 1576  hd today  - Renal diet.    Unresponsiveness:  Verbally responsive  work up per team  possible sec to sepsis     Hyperkalemia:  K wnl  EKG not consistent with Hyperkalemia.  resolved  hd per schedule  - Follow up BMP.    anemia  above goal   epo with hd as needed   monitor

## 2024-07-08 NOTE — PROGRESS NOTE ADULT - PROBLEM SELECTOR PLAN 7
Plan:  Fluid: 1 L restrict  E: cautious w/ ESRD  N: renal pureed diet (nutrition rec adding Nepro and dysphagia screen-ordered)  D: hold heparin subQ DVT prophylaxis, monitor for bleeding (is already on eliquis), if no further signs can do DVT prophylaxis. Plan:  Fluid: 1 L restrict  E: cautious w/ ESRD  N: renal pureed diet (nutrition rec adding Nepro and dysphagia screen-ordered)  D: eliquis

## 2024-07-08 NOTE — PROGRESS NOTE ADULT - SUBJECTIVE AND OBJECTIVE BOX
INTERVAL HPI/OVERNIGHT EVENTS:  Pt seen and examined at bedside. No acute overnight events or complaints. Mr. Sim denies CP, SOB, HA, cough.     VITAL SIGNS:  T(F): 97.9 (07-08-24 @ 05:35)  HR: 68 (07-08-24 @ 05:35)  BP: 117/60 (07-08-24 @ 05:35)  RR: 17 (07-08-24 @ 05:35)  SpO2: 100% (07-08-24 @ 05:35)  Wt(kg): --    PHYSICAL EXAM:    Constitutional: WDWN, NAD  HEENT: PERRL, EOMI, sclera non-icteric, neck supple, trachea midline, no masses, no JVD, MMM, good dentition  Respiratory: CTA b/l, good air entry b/l, no wheezing, no rhonchi, no rales, without accessory muscle use and no intercostal retractions  Cardiovascular: RRR, normal S1S2, no M/R/G  Gastrointestinal: soft, NTND, no masses palpable, BS normal  Extremities: Warm, well perfused, pulses equal bilateral upper and lower extremities, no edema, no clubbing. Capillary refill <2 sec  Neurological: AAOx3, CN Grossly intact  Skin: Normal temperature, warm, dry    MEDICATIONS  (STANDING):  ascorbic acid 500 milliGRAM(s) Oral daily  aspirin enteric coated 81 milliGRAM(s) Oral daily  atorvastatin 40 milliGRAM(s) Oral at bedtime  carvedilol 6.25 milliGRAM(s) Oral every 12 hours  chlorhexidine 2% Cloths 1 Application(s) Topical daily  dextrose 50% Injectable 12.5 Gram(s) IV Push once  dextrose 50% Injectable 25 Gram(s) IV Push once  dextrose 50% Injectable 25 Gram(s) IV Push once  dextrose Oral Gel 15 Gram(s) Oral once  epoetin dinah (EPOGEN) Injectable 6000 Unit(s) IV Push <User Schedule>  glucagon  Injectable 1 milliGRAM(s) IntraMuscular once  lactulose Syrup 10 Gram(s) Oral daily  latanoprost 0.005% Ophthalmic Solution 1 Drop(s) Both EYES at bedtime  mirtazapine 15 milliGRAM(s) Oral at bedtime  multivitamin 1 Tablet(s) Oral daily  pantoprazole   Suspension 40 milliGRAM(s) Oral daily  polyethylene glycol 3350 17 Gram(s) Oral daily  prednisoLONE acetate 1% Suspension 1 Drop(s) Both EYES every 12 hours  senna 2 Tablet(s) Oral at bedtime    MEDICATIONS  (PRN):  acetaminophen     Tablet .. 650 milliGRAM(s) Oral every 6 hours PRN Temp greater or equal to 38C (100.4F), Mild Pain (1 - 3)  LORazepam   Injectable 1 milliGRAM(s) IV Push every 8 hours PRN seizure >2 minutes  midodrine. 7.5 milliGRAM(s) Oral <User Schedule> PRN PRIOR TO HEMODIALYSIS  ondansetron Injectable 4 milliGRAM(s) IV Push every 8 hours PRN Nausea and/or Vomiting  sodium chloride 0.9% Bolus. 100 milliLiter(s) IV Bolus every 5 minutes PRN SBP LESS THAN or EQUAL to 90 mmHg      Allergies    No Known Allergies    Intolerances        LABS:                        11.6   3.87  )-----------( 173      ( 07 Jul 2024 06:11 )             38.2     07-07    136  |  96<L>  |  34<H>  ----------------------------<  52<LL>  4.9   |  23  |  5.05<H>    Ca    8.8      07 Jul 2024 06:11  Phos  4.6     07-07  Mg     2.50     07-07        Urinalysis Basic - ( 07 Jul 2024 06:11 )    Color: x / Appearance: x / SG: x / pH: x  Gluc: 52 mg/dL / Ketone: x  / Bili: x / Urobili: x   Blood: x / Protein: x / Nitrite: x   Leuk Esterase: x / RBC: x / WBC x   Sq Epi: x / Non Sq Epi: x / Bacteria: x        RADIOLOGY & ADDITIONAL TESTS:  Reviewed      ******************  Authored By: Elle Pool MD, PGY1  MS Teams Preferred  ******************

## 2024-07-08 NOTE — PROGRESS NOTE ADULT - PROBLEM SELECTOR PLAN 1
TME likely 2/2 UTI    Plan:   -given gliosis, biting of lower tongue ordered vEEG r/o seizure like activity: abnormal EEG  -neuro checks ordered  -ativan 1 mg prn seizure >2 minutes  -CT head w/ old gliosis, no acute ICH, masses, or strokes. Neuro exam UE 4/5 and a/o times 1-2 currently  -POC glucose checks  -f/u blood cultures (blood, urine, MRSA swab sent and waiting for results): if febrile do vancomycin and zosyn (recent MRSA bacteremia). All cultures negative x72 hours  -preliminary EEG report: no seizure activity but abnormal bilateral temporal dysfunction  -neuro rec: bran MRI ordered TME likely 2/2 UTI    Plan:   -given gliosis, biting of lower tongue ordered vEEG r/o seizure like activity: abnormal EEG  -neuro checks ordered  -ativan 1 mg prn seizure >2 minutes  -CT head w/ old gliosis, no acute ICH, masses, or strokes. Neuro exam UE 4/5 and a/o times 1-2 currently  -POC glucose checks  -f/u blood cultures (blood, urine, MRSA swab sent and waiting for results): if febrile do vancomycin and zosyn (recent MRSA bacteremia). All cultures negative x72 hours  -Neuro rec brain MRI: ordered and spoke to [phone] (should get MRI today 7/8)  -preliminary EEG report: no seizure activity but abnormal bilateral temporal dysfunction  -neuro rec: bran MRI ordered

## 2024-07-08 NOTE — PROGRESS NOTE ADULT - SUBJECTIVE AND OBJECTIVE BOX
[facility]   MD MARIBELL CARRION MD ANGELA WONG, PA    TEL:  OFFICE: [phone]  From 5pm-7am Answering Service [phone]    -- RENAL FOLLOW UP NOTE ---Date of Service 07-08-24 @ 12:28    Patient is a 69y old  Male who presents with a chief complaint of AMS (08 Jul 2024 07:22)      Patient seen and examined at bedside. No chest pain/sob    VITALS:  T(F): 97.7 (07-08-24 @ 10:50), Max: 98.9 (07-07-24 @ 13:20)  HR: 71 (07-08-24 @ 10:50)  BP: 153/75 (07-08-24 @ 10:50)  RR: 18 (07-08-24 @ 10:50)  SpO2: 98% (07-08-24 @ 10:50)  Wt(kg): --        PHYSICAL EXAM:  General: NAD  Neck: No JVD  Respiratory: CTAB, no wheezes, rales or rhonchi  Cardiovascular: S1, S2, RRR  Gastrointestinal b/l [facility] Medications:   MEDICATIONS  (STANDING):  ascorbic acid 500 milliGRAM(s) Oral daily  aspirin enteric coated 81 milliGRAM(s) Oral daily  atorvastatin 40 milliGRAM(s) Oral at bedtime  carvedilol 6.25 milliGRAM(s) Oral every 12 hours  chlorhexidine 2% Cloths 1 Application(s) Topical daily  dextrose 50% Injectable 25 Gram(s) IV Push once  dextrose 50% Injectable 25 Gram(s) IV Push once  dextrose 50% Injectable 12.5 Gram(s) IV Push once  dextrose Oral Gel 15 Gram(s) Oral once  epoetin dinah (EPOGEN) Injectable 6000 Unit(s) IV Push <User Schedule>  glucagon  Injectable 1 milliGRAM(s) IntraMuscular once  lactulose Syrup 10 Gram(s) Oral daily  latanoprost 0.005% Ophthalmic Solution 1 Drop(s) Both EYES at bedtime  mirtazapine 15 milliGRAM(s) Oral at bedtime  multivitamin 1 Tablet(s) Oral daily  pantoprazole   Suspension 40 milliGRAM(s) Oral daily  polyethylene glycol 3350 17 Gram(s) Oral daily  prednisoLONE acetate 1% Suspension 1 Drop(s) Both EYES every 12 hours  senna 2 Tablet(s) Oral at bedtime      LABS:  07-07    136  |  96<L>  |  34<H>  ----------------------------<  52<LL>  4.9   |  23  |  5.05<H>    Ca    8.8      07 Jul 2024 06:11  Phos  4.6     07-07  Mg     2.50     07-07      Creatinine Trend: 5.05 <--, 3.81 <--, 4.48 <--, 3.38 <--, 2.69 <--, 1.86 <--, 5.49 <--, 5.15 <--                                11.6   3.87  )-----------( 173      ( 07 Jul 2024 06:11 )             38.2     Urine Studies:  Urinalysis - [07-07-24 @ 06:11]      Color  / Appearance  / SG  / pH       Gluc 52 / Ketone   / Bili  / Urobili        Blood  / Protein  / Leuk Est  / Nitrite       RBC  / WBC  / Hyaline  / Gran  / Sq Epi  / Non Sq Epi  / Bacteria       Iron 42, TIBC 127, %sat 33      [05-11-24 @ 07:26]  Ferritin 1680      [05-11-24 @ 07:26]  PTH -- (Ca --)      [04-28-24 @ 05:00]   155  HbA1c 4.2      [07-19-19 @ 09:56]  TSH 1.96      [05-07-24 @ 04:54]  Lipid: chol 119, TG 66, HDL 46, LDL --      [10-07-23 @ 09:30]    HBsAb 55.8      [07-03-24 @ 23:30]  HBsAg Nonreact      [07-03-24 @ 23:30]  HBcAb Nonreact      [07-03-24 @ 23:30]  HCV 0.11, Nonreact      [07-03-24 @ 23:30]      RADIOLOGY & ADDITIONAL STUDIES:

## 2024-07-08 NOTE — PROGRESS NOTE ADULT - ATTENDING COMMENTS
69 year old M hx of ESRD on HD left AVF MWFr, CVA w/ gliosis, BKA/AKA functional quadriplegia, CAD, HTN, HLD, recent admission for MRSA bacteremia admitted for non responsiveness at facility. Now improved. S/p CTH notable for encephalomalacia and gliosis in the b/l occipital   lobes and cerebellum secondary to old infarctions and oderate periventricular white matter ischemia is noted. Also noted to have elevated Hst.     -Encephalopathy thought to be 2/2 UTI. However, pt noted to have recovery of MS after straight cath in the ED and urine cx neg (obtained prior to abx) which does not fit picture for UTI. Furthermore he has been afebrile and w/o leukocytosis. 7/3 blood cx NTD. Would dc ceftriaxone and monitor off abx.   -Noted elevated troponin levels and TWI on EKG. However CKMB and CK wnl and pt w/ ESRD. Low concern for ACS. C/w aspirin 81 mg , lipitor 40 mg and carvedilol 6.25 mg BID. Repeat EKG. Will obtain TTE   -Given gliosis, biting of lower tongue ordered vEEG. Too late to obtain prolactin level at this time. Neuro checks. Pt currently with no further episodes. Given no identifiable source of pts symptoms.  consulted  neuro , neuro agree with VEEG , and rec  if MS worsens, can obtain MRI brain.  Veeg still in progress , Prelim read : Mild bilateral temporal focal cerebral dysfunction can be structural or functional in etiology.  No epileptiform abnormalities or seizures.  Will f/u Final VEEG report   Neuro F/U   DC planning 69M w/ hypertension, coronary artery disease, ESRD on HD (L AVF, MWF), prior stroke c/b gliosis presenting from long term care facility with unresponsiveness without clear etiology, now improved. Infectious workup was negative and EEG without specific findings. Neurology recommending inpatient MRI and outpatient neurology follow-up.    Time-based billing (NON-critical care).     50 minutes spent on total encounter; more than 50% of the visit was spent counseling and / or coordinating care by the attending physician.  The necessity of the time spent during the encounter on this date of service was due to:     documentation in [place], reviewing chart and coordinating care with patient/resident and interdisciplinary staff (such as , social workers, etc) as well as reviewing vitals, laboratory data, radiology, medication list, consultants' recommendations and prior records. Interventions were performed as documented above.

## 2024-07-09 ENCOUNTER — TRANSCRIPTION ENCOUNTER (OUTPATIENT)
Age: 69
End: 2024-07-09

## 2024-07-09 VITALS — HEART RATE: 70 BPM | SYSTOLIC BLOOD PRESSURE: 105 MMHG | DIASTOLIC BLOOD PRESSURE: 58 MMHG

## 2024-07-09 DIAGNOSIS — R79.89 OTHER SPECIFIED ABNORMAL FINDINGS OF BLOOD CHEMISTRY: ICD-10-CM

## 2024-07-09 LAB
ALBUMIN SERPL ELPH-MCNC: 3.2 G/DL — LOW (ref 3.3–5)
ALP SERPL-CCNC: 92 U/L — SIGNIFICANT CHANGE UP (ref 40–120)
ALT FLD-CCNC: 9 U/L — SIGNIFICANT CHANGE UP (ref 4–41)
ANION GAP SERPL CALC-SCNC: 14 MMOL/L — SIGNIFICANT CHANGE UP (ref 7–14)
AST SERPL-CCNC: 21 U/L — SIGNIFICANT CHANGE UP (ref 4–40)
BASOPHILS # BLD AUTO: 0.05 K/UL — SIGNIFICANT CHANGE UP (ref 0–0.2)
BASOPHILS NFR BLD AUTO: 1.2 % — SIGNIFICANT CHANGE UP (ref 0–2)
BILIRUB SERPL-MCNC: 0.3 MG/DL — SIGNIFICANT CHANGE UP (ref 0.2–1.2)
BUN SERPL-MCNC: 24 MG/DL — HIGH (ref 7–23)
CALCIUM SERPL-MCNC: 8.9 MG/DL — SIGNIFICANT CHANGE UP (ref 8.4–10.5)
CHLORIDE SERPL-SCNC: 95 MMOL/L — LOW (ref 98–107)
CO2 SERPL-SCNC: 26 MMOL/L — SIGNIFICANT CHANGE UP (ref 22–31)
CREAT SERPL-MCNC: 4 MG/DL — HIGH (ref 0.5–1.3)
EGFR: 15 ML/MIN/1.73M2 — LOW
EOSINOPHIL # BLD AUTO: 0.35 K/UL — SIGNIFICANT CHANGE UP (ref 0–0.5)
EOSINOPHIL NFR BLD AUTO: 8.6 % — HIGH (ref 0–6)
GLUCOSE BLDC GLUCOMTR-MCNC: 121 MG/DL — HIGH (ref 70–99)
GLUCOSE BLDC GLUCOMTR-MCNC: 121 MG/DL — HIGH (ref 70–99)
GLUCOSE BLDC GLUCOMTR-MCNC: 61 MG/DL — LOW (ref 70–99)
GLUCOSE BLDC GLUCOMTR-MCNC: 69 MG/DL — LOW (ref 70–99)
GLUCOSE BLDC GLUCOMTR-MCNC: 75 MG/DL — SIGNIFICANT CHANGE UP (ref 70–99)
GLUCOSE BLDC GLUCOMTR-MCNC: 76 MG/DL — SIGNIFICANT CHANGE UP (ref 70–99)
GLUCOSE SERPL-MCNC: 69 MG/DL — LOW (ref 70–99)
HCT VFR BLD CALC: 37.1 % — LOW (ref 39–50)
HGB BLD-MCNC: 11.7 G/DL — LOW (ref 13–17)
IANC: 2.32 K/UL — SIGNIFICANT CHANGE UP (ref 1.8–7.4)
IMM GRANULOCYTES NFR BLD AUTO: 0.5 % — SIGNIFICANT CHANGE UP (ref 0–0.9)
LYMPHOCYTES # BLD AUTO: 0.84 K/UL — LOW (ref 1–3.3)
LYMPHOCYTES # BLD AUTO: 20.5 % — SIGNIFICANT CHANGE UP (ref 13–44)
MAGNESIUM SERPL-MCNC: 2.3 MG/DL — SIGNIFICANT CHANGE UP (ref 1.6–2.6)
MCHC RBC-ENTMCNC: 26.5 PG — LOW (ref 27–34)
MCHC RBC-ENTMCNC: 31.5 GM/DL — LOW (ref 32–36)
MCV RBC AUTO: 83.9 FL — SIGNIFICANT CHANGE UP (ref 80–100)
MONOCYTES # BLD AUTO: 0.51 K/UL — SIGNIFICANT CHANGE UP (ref 0–0.9)
MONOCYTES NFR BLD AUTO: 12.5 % — SIGNIFICANT CHANGE UP (ref 2–14)
NEUTROPHILS # BLD AUTO: 2.32 K/UL — SIGNIFICANT CHANGE UP (ref 1.8–7.4)
NEUTROPHILS NFR BLD AUTO: 56.7 % — SIGNIFICANT CHANGE UP (ref 43–77)
NRBC # BLD: 0 /100 WBCS — SIGNIFICANT CHANGE UP (ref 0–0)
NRBC # FLD: 0 K/UL — SIGNIFICANT CHANGE UP (ref 0–0)
PHOSPHATE SERPL-MCNC: 3.6 MG/DL — SIGNIFICANT CHANGE UP (ref 2.5–4.5)
PLATELET # BLD AUTO: 206 K/UL — SIGNIFICANT CHANGE UP (ref 150–400)
POTASSIUM SERPL-MCNC: 4.1 MMOL/L — SIGNIFICANT CHANGE UP (ref 3.5–5.3)
POTASSIUM SERPL-SCNC: 4.1 MMOL/L — SIGNIFICANT CHANGE UP (ref 3.5–5.3)
PROT SERPL-MCNC: 7.5 G/DL — SIGNIFICANT CHANGE UP (ref 6–8.3)
RBC # BLD: 4.42 M/UL — SIGNIFICANT CHANGE UP (ref 4.2–5.8)
RBC # FLD: 19.1 % — HIGH (ref 10.3–14.5)
SODIUM SERPL-SCNC: 135 MMOL/L — SIGNIFICANT CHANGE UP (ref 135–145)
WBC # BLD: 4.09 K/UL — SIGNIFICANT CHANGE UP (ref 3.8–10.5)
WBC # FLD AUTO: 4.09 K/UL — SIGNIFICANT CHANGE UP (ref 3.8–10.5)

## 2024-07-09 PROCEDURE — 99239 HOSP IP/OBS DSCHRG MGMT >30: CPT | Mod: GC

## 2024-07-09 RX ORDER — ERYTHROPOIETIN 4000 [IU]/ML
6000 INJECTION, SOLUTION INTRAVENOUS; SUBCUTANEOUS
Qty: 0 | Refills: 0 | DISCHARGE
Start: 2024-07-09

## 2024-07-09 RX ADMIN — CARVEDILOL PHOSPHATE 6.25 MILLIGRAM(S): 80 CAPSULE, EXTENDED RELEASE ORAL at 05:28

## 2024-07-09 RX ADMIN — PANTOPRAZOLE SODIUM 40 MILLIGRAM(S): 40 INJECTION, POWDER, FOR SOLUTION INTRAVENOUS at 12:05

## 2024-07-09 RX ADMIN — HEPARIN SODIUM 5000 UNIT(S): 50 INJECTION, SOLUTION INTRAVENOUS at 05:28

## 2024-07-09 RX ADMIN — Medication 1 APPLICATION(S): at 12:05

## 2024-07-09 RX ADMIN — ASPIRIN 81 MILLIGRAM(S): 325 TABLET, FILM COATED ORAL at 12:04

## 2024-07-09 RX ADMIN — HEPARIN SODIUM 5000 UNIT(S): 50 INJECTION, SOLUTION INTRAVENOUS at 18:06

## 2024-07-09 RX ADMIN — Medication 500 MILLIGRAM(S): at 12:04

## 2024-07-09 RX ADMIN — Medication 1 DROP(S): at 17:21

## 2024-07-09 RX ADMIN — Medication 1 TABLET(S): at 12:04

## 2024-07-09 RX ADMIN — Medication 1 DROP(S): at 05:28

## 2024-07-09 NOTE — PROGRESS NOTE ADULT - ATTENDING COMMENTS
69M w/ hypertension, coronary artery disease, ESRD on HD (L AVF, MWF), prior stroke c/b gliosis presenting from long term care facility with unresponsiveness without clear etiology, now improved. Infectious workup was negative and EEG without specific findings and MRI with chronic changes. Plan to discharge back to facility with outpatient neurology follow-up.

## 2024-07-09 NOTE — PROGRESS NOTE ADULT - SUBJECTIVE AND OBJECTIVE BOX
INTERVAL HPI/OVERNIGHT EVENTS:  Pt seen and examined at bedside. No acute overnight events or complaints.    VITAL SIGNS:  T(F): 97.9 (07-09-24 @ 05:19)  HR: 72 (07-09-24 @ 05:19)  BP: 121/77 (07-09-24 @ 05:19)  RR: 17 (07-09-24 @ 05:19)  SpO2: 100% (07-09-24 @ 05:19)  Wt(kg): --    PHYSICAL EXAM:    Constitutional: WDWN, NAD  HEENT: PERRL, EOMI, sclera non-icteric, neck supple, trachea midline, no masses, no JVD, MMM, good dentition  Respiratory: CTA b/l, good air entry b/l, no wheezing, no rhonchi, no rales, without accessory muscle use and no intercostal retractions  Cardiovascular: RRR, normal S1S2, no M/R/G  Gastrointestinal: soft, NTND, no masses palpable, BS normal  Extremities: Warm, well perfused, no edema, no clubbing. Capillary refill <2 sec  Neurological: AAOx3, CN Grossly intact  Skin: Normal temperature, warm, dry    MEDICATIONS  (STANDING):  ascorbic acid 500 milliGRAM(s) Oral daily  aspirin enteric coated 81 milliGRAM(s) Oral daily  atorvastatin 40 milliGRAM(s) Oral at bedtime  carvedilol 6.25 milliGRAM(s) Oral every 12 hours  chlorhexidine 2% Cloths 1 Application(s) Topical daily  dextrose 50% Injectable 25 Gram(s) IV Push once  dextrose 50% Injectable 25 Gram(s) IV Push once  dextrose 50% Injectable 12.5 Gram(s) IV Push once  dextrose Oral Gel 15 Gram(s) Oral once  epoetin dinah (EPOGEN) Injectable 6000 Unit(s) IV Push <User Schedule>  glucagon  Injectable 1 milliGRAM(s) IntraMuscular once  heparin   Injectable 5000 Unit(s) SubCutaneous every 12 hours  lactulose Syrup 10 Gram(s) Oral daily  latanoprost 0.005% Ophthalmic Solution 1 Drop(s) Both EYES at bedtime  mirtazapine 15 milliGRAM(s) Oral at bedtime  multivitamin 1 Tablet(s) Oral daily  pantoprazole   Suspension 40 milliGRAM(s) Oral daily  polyethylene glycol 3350 17 Gram(s) Oral daily  prednisoLONE acetate 1% Suspension 1 Drop(s) Both EYES every 12 hours  senna 2 Tablet(s) Oral at bedtime    MEDICATIONS  (PRN):  acetaminophen     Tablet .. 650 milliGRAM(s) Oral every 6 hours PRN Temp greater or equal to 38C (100.4F), Mild Pain (1 - 3)  LORazepam   Injectable 1 milliGRAM(s) IV Push every 8 hours PRN seizure >2 minutes  midodrine. 7.5 milliGRAM(s) Oral <User Schedule> PRN PRIOR TO HEMODIALYSIS  ondansetron Injectable 4 milliGRAM(s) IV Push every 8 hours PRN Nausea and/or Vomiting  sodium chloride 0.9% Bolus. 100 milliLiter(s) IV Bolus every 5 minutes PRN SBP LESS THAN or EQUAL to 90 mmHg      Allergies    No Known Allergies    Intolerances        LABS:                RADIOLOGY & ADDITIONAL TESTS:  Reviewed      ******************  Authored By: Elle Pool MD, PGY1  MS Teams Preferred  ******************

## 2024-07-09 NOTE — DISCHARGE NOTE NURSING/CASE MANAGEMENT/SOCIAL WORK - PATIENT PORTAL LINK FT
You can access the FollowMyHealth Patient Portal offered by [facility] by registering at the following website: [email or web address]. By joining [employer]’s FollowMyHealth portal, you will also be able to view your health information using other applications (apps) compatible with our system.

## 2024-07-09 NOTE — PROGRESS NOTE ADULT - PROBLEM SELECTOR PLAN 7
Plan:  Fluid: 1 L restrict  E: cautious w/ ESRD  N: renal pureed diet (nutrition rec adding Nepro and dysphagia screen-ordered)  D: subQ heparin

## 2024-07-09 NOTE — PROGRESS NOTE ADULT - PROBLEM SELECTOR PLAN 3
·  Plan: -troponin elevation likely 2/2 UTI  -CKMB and CK wnl, though TWI can obtain TTE  -c/w aspirin 81 mg and lipitor 40 mg   -c/w carvedilol 6.25 mg BID  -low concern for ACS given ckmb and ck levels.
Troponin elevation likely 2/2 UTI    Plan:  -CKMB and CK wnl, though TWI can obtain TTE  -c/w aspirin 81 mg and lipitor 40 mg   -c/w carvedilol 6.25 mg BID  -low concern for ACS given ckmb and ck levels.
·  Plan: -troponin elevation likely 2/2 UTI  -CKMB and CK wnl, though TWI can obtain TTE  -c/w aspirin 81 mg and lipitor 40 mg   -c/w carvedilol 6.25 mg BID  -low concern for ACS given ckmb and ck levels.
·  Plan: -troponin elevation likely 2/2 UTI  -CKMB and CK wnl, though TWI can obtain TTE  -c/w aspirin 81 mg and lipitor 40 mg   -c/w carvedilol 6.25 mg BID  -low concern for ACS given ckmb and ck levels.
Troponin elevation likely 2/2 UTI    Plan:  -CKMB and CK wnl, though TWI can obtain TTE  -c/w aspirin 81 mg and lipitor 40 mg   -c/w carvedilol 6.25 mg BID  -low concern for ACS given ckmb and ck levels.
-troponin elevation likely 2/2 UTI  -CKMB and CK wnl, though TWI can obtain TTE  -c/w aspirin 81 mg and lipitor 40 mg   -c/w carvedilol 6.25 mg BID  -low concern for ACS given ckmb and ck levels

## 2024-07-09 NOTE — PROGRESS NOTE ADULT - PROBLEM SELECTOR PLAN 2
Plan: c/w ceftriaxone  -waiting for urine cultures to result: NGTD  -bowel regimen ordered.
·  Plan: -c/w ceftriaxone  -waiting for urine cultures to result: NGTD  -bowel regimen ordered.
-c/w ceftriaxone  -waiting for urine cultures to result  -bowel regimen ordered
Plan: c/w ceftriaxone  -waiting for urine cultures to result: NGTD  -bowel regimen ordered.
·  Plan: -c/w ceftriaxone  -waiting for urine cultures to result  -bowel regimen ordered.
·  Plan: -c/w ceftriaxone  -waiting for urine cultures to result  -bowel regimen ordered.

## 2024-07-09 NOTE — PROGRESS NOTE ADULT - PROBLEM SELECTOR PLAN 6
Plan: c/w mirtazapine 7.5 mg at bedtime.
Plan: c/w mirtazapine 7.5 mg at bedtime.
Plan: -c/w mirtazapine 7.5 mg at bedtime.
Plan: -c/w mirtazapine 7.5 mg at bedtime.
Plan: c/w mirtazapine 7.5 mg at bedtime.
-c/w mirtazapine 7.5 mg at bedtime

## 2024-07-09 NOTE — PROGRESS NOTE ADULT - PROBLEM SELECTOR PROBLEM 2
Acute urinary tract infection

## 2024-07-09 NOTE — DISCHARGE NOTE NURSING/CASE MANAGEMENT/SOCIAL WORK - NSDCFUADDAPPT_GEN_ALL_CORE_FT
APPTS ARE READY TO BE MADE: [X] YES    Best Family or Patient Contact (if needed): niece (Ramonita: [phone])    Additional Information about above appointments (if needed):    1: Please make follow up appointment with Neuroscience at [address] [place] [address], [address] [place], [phone].    2:   3:     Other comments or requests:

## 2024-07-09 NOTE — CHART NOTE - NSCHARTNOTEFT_GEN_A_CORE
Patient is being discharged to [facility]. Caregiver will arrange follow up.

## 2024-07-09 NOTE — DISCHARGE NOTE NURSING/CASE MANAGEMENT/SOCIAL WORK - NSDCVIVACCINE_GEN_ALL_CORE_FT
Tdap; 15-Aug-2017 22:58; Shalom Stanley); [employer]; [id number]; IntraMuscular; Deltoid Left.; 0.5 milliLiter(s); VIS (VIS Published: 09-May-2013, VIS Presented: 15-Aug-2017);

## 2024-07-09 NOTE — PROGRESS NOTE ADULT - SUBJECTIVE AND OBJECTIVE BOX
[facility] NEPHROLOGY PRACTICE   MD MARIBELL CARRION MD ANGELA WONG, PA    TEL:  OFFICE: [phone]  From 5pm-7am Answering Service [phone]    -- RENAL FOLLOW UP NOTE ---Date of Service 07-09-24 @ 12:07    Patient is a 69y old  Male who presents with a chief complaint of AMS (09 Jul 2024 07:59)      Patient seen and examined at bedside. No chest pain/sob    VITALS:  T(F): 97.9 (07-09-24 @ 05:19), Max: 98.2 (07-08-24 @ 14:05)  HR: 72 (07-09-24 @ 05:19)  BP: 121/77 (07-09-24 @ 05:19)  RR: 17 (07-09-24 @ 05:19)  SpO2: 100% (07-09-24 @ 05:19)  Wt(kg): --    07-08 @ 07:01  -  07-09 @ 07:00  --------------------------------------------------------  IN: 500 mL / OUT: 1100 mL / NET: -600 mL      Height (cm): 121.9 (07-08 @ 17:38)    PHYSICAL EXAM:  General: NAD  Neck: No JVD  Respiratory: CTAB, no wheezes, rales or rhonchi  Cardiovascular: S1, S2, RRR  Gastrointestinal: BS+, soft, NT/ND  Extremities: b/l amputation     Hospital Medications:   MEDICATIONS  (STANDING):  ascorbic acid 500 milliGRAM(s) Oral daily  aspirin enteric coated 81 milliGRAM(s) Oral daily  atorvastatin 40 milliGRAM(s) Oral at bedtime  carvedilol 6.25 milliGRAM(s) Oral every 12 hours  chlorhexidine 2% Cloths 1 Application(s) Topical daily  dextrose 50% Injectable 25 Gram(s) IV Push once  dextrose 50% Injectable 25 Gram(s) IV Push once  dextrose 50% Injectable 12.5 Gram(s) IV Push once  dextrose Oral Gel 15 Gram(s) Oral once  epoetin dinah (EPOGEN) Injectable 6000 Unit(s) IV Push <User Schedule>  glucagon  Injectable 1 milliGRAM(s) IntraMuscular once  heparin   Injectable 5000 Unit(s) SubCutaneous every 12 hours  lactulose Syrup 10 Gram(s) Oral daily  latanoprost 0.005% Ophthalmic Solution 1 Drop(s) Both EYES at bedtime  mirtazapine 15 milliGRAM(s) Oral at bedtime  multivitamin 1 Tablet(s) Oral daily  pantoprazole   Suspension 40 milliGRAM(s) Oral daily  polyethylene glycol 3350 17 Gram(s) Oral daily  prednisoLONE acetate 1% Suspension 1 Drop(s) Both EYES every 12 hours  senna 2 Tablet(s) Oral at bedtime      LABS:  07-09    135  |  95<L>  |  24<H>  ----------------------------<  69<L>  4.1   |  26  |  4.00<H>    Ca    8.9      09 Jul 2024 06:48  Phos  3.6     07-09  Mg     2.30     07-09    TPro  7.5  /  Alb  3.2<L>  /  TBili  0.3  /  DBili      /  AST  21  /  ALT  9   /  AlkPhos  92  07-09    Creatinine Trend: 4.00 <--, 5.05 <--, 3.81 <--, 4.48 <--, 3.38 <--, 2.69 <--, 1.86 <--, 5.49 <--, 5.15 <--    Albumin: 3.2 g/dL (07-09 @ 06:48)  Phosphorus: 3.6 mg/dL (07-09 @ 06:48)                              11.7   4.09  )-----------( 206      ( 09 Jul 2024 06:48 )             37.1     Urine Studies:  Urinalysis - [07-09-24 @ 06:48]      Color  / Appearance  / SG  / pH       Gluc 69 / Ketone   / Bili  / Urobili        Blood  / Protein  / Leuk Est  / Nitrite       RBC  / WBC  / Hyaline  / Gran  / Sq Epi  / Non Sq Epi  / Bacteria       Iron 42, TIBC 127, %sat 33      [05-11-24 @ 07:26]  Ferritin 1680      [05-11-24 @ 07:26]  PTH -- (Ca --)      [04-28-24 @ 05:00]   155  HbA1c 4.2      [07-19-19 @ 09:56]  TSH 1.96      [05-07-24 @ 04:54]  Lipid: chol 119, TG 66, HDL 46, LDL --      [10-07-23 @ 09:30]    HBsAb 55.8      [07-03-24 @ 23:30]  HBsAg Nonreact      [07-03-24 @ 23:30]  HBcAb Nonreact      [07-03-24 @ 23:30]  HCV 0.11, Nonreact      [07-03-24 @ 23:30]      RADIOLOGY & ADDITIONAL STUDIES:

## 2024-07-09 NOTE — PROGRESS NOTE ADULT - PROVIDER SPECIALTY LIST ADULT
Internal Medicine
Nephrology
Internal Medicine

## 2024-07-09 NOTE — PROGRESS NOTE ADULT - PROBLEM SELECTOR PLAN 1
DDx: UTI, seizure, syncope, hypoglycemia, stroke    Plan:   -given gliosis, biting of lower tongue ordered vEEG r/o seizure like activity: abnormal EEG  -neuro checks ordered  -ativan 1 mg prn seizure >2 minutes  -CT head w/ old gliosis, no acute ICH, masses, or strokes. Neuro exam UE 4/5 and a/o times 1-2 currently  -POC glucose checks  -f/u blood cultures (blood, urine, MRSA swab sent and waiting for results): if febrile do vancomycin and zosyn (recent MRSA bacteremia). All cultures negative x72 hours  -Neuro rec brain MRI: ordered and spoke to [phone] (should get MRI today 7/8)  -preliminary EEG report: no seizure activity but abnormal bilateral temporal dysfunction  -neuro rec: bran MRI ordered

## 2024-07-09 NOTE — PROGRESS NOTE ADULT - PROBLEM SELECTOR PLAN 4
-chronic as above with no changes on CT
·  Plan: -chronic as above with no changes on CT.
Chronic as above with no changes on CT.    Plan: NTD
Chronic as above with no changes on CT.    Plan: NTD

## 2024-07-09 NOTE — PROGRESS NOTE ADULT - PROBLEM SELECTOR PLAN 5
Plan: HD MWF  -nephro following  -c/w midodrine 7.5 mg prior to HD   -renal diet (is on pureed) w Nepro supplement 2x/day
Plan: HD MWF  -nephro following  -c/w midodrine 7.5 mg prior to HD   -renal diet (is on pureed) w Nepro supplement 2x/day
·  Plan: HD MWF  c/w midodrine 7.5 mg prior to HD   -renal diet (is on pureed).
-HD MWF  c/w midodrine 7.5 mg prior to HD   -renal diet (is on pureed)
·  Plan: -HD MWF  c/w midodrine 7.5 mg prior to HD   -renal diet (is on pureed).
·  Plan: -HD MWF  c/w midodrine 7.5 mg prior to HD   -renal diet (is on pureed).

## 2024-07-23 ENCOUNTER — APPOINTMENT (OUTPATIENT)
Dept: ELECTROPHYSIOLOGY | Facility: CLINIC | Age: 69
End: 2024-07-23

## 2024-07-23 NOTE — HISTORY OF PRESENT ILLNESS
[FreeTextEntry1] : Elgin Sim is a 69-year-old man from extended care facility with hx of ESRD on HD left AVF, CVA w/ gliosis, BKA/AKA functional quadriplegia, CAD, HTN, HLD, s/p ILR ( 6/21/24) recent admission for MRSA bacteremia who is here for a follow up. Last remote 6/21/24 with NSR with no events for review.   ,

## 2024-07-23 NOTE — DISCUSSION/SUMMARY
[FreeTextEntry1] : Impression:  1. CVA s/p ILR:  EKG performed today to assess for presence of conduction disease, pre-excitation or atrial fibrillation reveals   2.  HTN; Encourage heart healthy diet, sodium restriction and weight management.. Continue regular f/u with Cardiologist for further HTN management.   .

## 2024-07-23 NOTE — CARDIOLOGY SUMMARY
[de-identified] : 7/23/24  [de-identified] : 7/5/24 TTE LVEF 35-40% CONCLUSIONS: 1. Left ventricular cavity is normal in size. Left ventricular systolic function is moderately decreased with an ejection fraction visually estimated at 35 to 40 %.  2. Mild left ventricular hypertrophy.  3. Normal right ventricular cavity size and normal right ventricular systolic function.  4. Structurally normal mitral valve with normal leaflet excursion.  5. Trileaflet aortic valve with reduced systolic excursion. There is calcification of the aortic valve leaflets.  6. The peak transaortic velocity is 1.54 m/s, peak transaortic gradient is 9.5 mmHg and mean transaortic gradient is 5.0 mmHg with an LVOT/aortic valve VTI ratio of 0.43.  7. Mild aortic regurgitation.  8. Estimated pulmonary artery systolic pressure is 23 mmHg.  9. The inferior vena cava is normal in size (normal <2.1cm) with normal inspiratory collapse (normal >50%) consistent with normal right atrial pressure ( R 3, range 0-5mmHg). 10. No pericardial effusion seen. 11. Left pleural effusion noted. 12. Compared to the transesophageal echocardiogram performed on 4/30/2024, there have been no significant interval changes.  [de-identified] : 7/2024: Brain MRI was found that showed chronic changes and no acute intracranial hemorrhage or ischemia. There is an unchanged left-sided schwannoma and chronic ischemia.

## 2024-07-26 ENCOUNTER — APPOINTMENT (OUTPATIENT)
Dept: ELECTROPHYSIOLOGY | Facility: CLINIC | Age: 69
End: 2024-07-26

## 2024-07-26 ENCOUNTER — NON-APPOINTMENT (OUTPATIENT)
Age: 69
End: 2024-07-26

## 2024-07-26 PROCEDURE — 93298 REM INTERROG DEV EVAL SCRMS: CPT

## 2024-07-29 ENCOUNTER — INPATIENT (INPATIENT)
Facility: HOSPITAL | Age: 69
LOS: 7 days | Discharge: NURSING, REHAB OR LONG-TERM CARE | End: 2024-08-06
Attending: STUDENT IN AN ORGANIZED HEALTH CARE EDUCATION/TRAINING PROGRAM | Admitting: STUDENT IN AN ORGANIZED HEALTH CARE EDUCATION/TRAINING PROGRAM
Payer: MEDICAID

## 2024-07-29 VITALS
HEIGHT: 60 IN | HEART RATE: 77 BPM | DIASTOLIC BLOOD PRESSURE: 76 MMHG | OXYGEN SATURATION: 100 % | SYSTOLIC BLOOD PRESSURE: 159 MMHG | TEMPERATURE: 98 F | RESPIRATION RATE: 21 BRPM

## 2024-07-29 DIAGNOSIS — Z98.890 OTHER SPECIFIED POSTPROCEDURAL STATES: Chronic | ICD-10-CM

## 2024-07-29 DIAGNOSIS — R41.82 ALTERED MENTAL STATUS, UNSPECIFIED: ICD-10-CM

## 2024-07-29 DIAGNOSIS — Z89.511 ACQUIRED ABSENCE OF RIGHT LEG BELOW KNEE: Chronic | ICD-10-CM

## 2024-07-29 DIAGNOSIS — Z89.612 ACQUIRED ABSENCE OF LEFT LEG ABOVE KNEE: Chronic | ICD-10-CM

## 2024-07-29 DIAGNOSIS — Z89.611 ACQUIRED ABSENCE OF RIGHT LEG ABOVE KNEE: Chronic | ICD-10-CM

## 2024-07-29 LAB
ADD ON TEST-SPECIMEN IN LAB: SIGNIFICANT CHANGE UP
ADD ON TEST-SPECIMEN IN LAB: SIGNIFICANT CHANGE UP
ALBUMIN SERPL ELPH-MCNC: 3.2 G/DL — LOW (ref 3.3–5)
ALP SERPL-CCNC: 143 U/L — HIGH (ref 40–120)
ALT FLD-CCNC: 40 U/L — SIGNIFICANT CHANGE UP (ref 4–41)
ANION GAP SERPL CALC-SCNC: 10 MMOL/L — SIGNIFICANT CHANGE UP (ref 7–14)
APTT BLD: 31.9 SEC — SIGNIFICANT CHANGE UP (ref 24.5–35.6)
AST SERPL-CCNC: 45 U/L — HIGH (ref 4–40)
BASE EXCESS BLDV CALC-SCNC: 10 MMOL/L — HIGH (ref -2–3)
BASOPHILS # BLD AUTO: 0.02 K/UL — SIGNIFICANT CHANGE UP (ref 0–0.2)
BASOPHILS NFR BLD AUTO: 0.4 % — SIGNIFICANT CHANGE UP (ref 0–2)
BILIRUB SERPL-MCNC: 0.2 MG/DL — SIGNIFICANT CHANGE UP (ref 0.2–1.2)
BLOOD GAS VENOUS COMPREHENSIVE RESULT: SIGNIFICANT CHANGE UP
BUN SERPL-MCNC: 77 MG/DL — HIGH (ref 7–23)
CALCIUM SERPL-MCNC: 9.2 MG/DL — SIGNIFICANT CHANGE UP (ref 8.4–10.5)
CHLORIDE BLDV-SCNC: 95 MMOL/L — LOW (ref 96–108)
CHLORIDE SERPL-SCNC: 93 MMOL/L — LOW (ref 98–107)
CK MB BLD-MCNC: <1.8 % — SIGNIFICANT CHANGE UP (ref 0–2.5)
CK MB CFR SERPL CALC: <1 NG/ML — SIGNIFICANT CHANGE UP
CK SERPL-CCNC: 55 U/L — SIGNIFICANT CHANGE UP (ref 30–200)
CO2 BLDV-SCNC: 37.8 MMOL/L — HIGH (ref 22–26)
CO2 SERPL-SCNC: 34 MMOL/L — HIGH (ref 22–31)
CREAT SERPL-MCNC: 4.93 MG/DL — HIGH (ref 0.5–1.3)
DIALYSIS INSTRUMENT RESULT - HEPATITIS B SURFACE ANTIGEN: NEGATIVE — SIGNIFICANT CHANGE UP
EGFR: 12 ML/MIN/1.73M2 — LOW
EOSINOPHIL # BLD AUTO: 0 K/UL — SIGNIFICANT CHANGE UP (ref 0–0.5)
EOSINOPHIL NFR BLD AUTO: 0 % — SIGNIFICANT CHANGE UP (ref 0–6)
GAS PNL BLDV: 135 MMOL/L — LOW (ref 136–145)
GLUCOSE BLDC GLUCOMTR-MCNC: 140 MG/DL — HIGH (ref 70–99)
GLUCOSE BLDV-MCNC: 215 MG/DL — HIGH (ref 70–99)
GLUCOSE SERPL-MCNC: 223 MG/DL — HIGH (ref 70–99)
HCO3 BLDV-SCNC: 36 MMOL/L — HIGH (ref 22–29)
HCT VFR BLD CALC: 29.4 % — LOW (ref 39–50)
HCT VFR BLDA CALC: 29 % — LOW (ref 39–51)
HGB BLD CALC-MCNC: 9.7 G/DL — LOW (ref 12.6–17.4)
HGB BLD-MCNC: 9.2 G/DL — LOW (ref 13–17)
IANC: 4.36 K/UL — SIGNIFICANT CHANGE UP (ref 1.8–7.4)
IMM GRANULOCYTES NFR BLD AUTO: 0.6 % — SIGNIFICANT CHANGE UP (ref 0–0.9)
INR BLD: 1.08 RATIO — SIGNIFICANT CHANGE UP (ref 0.85–1.18)
LACTATE BLDV-MCNC: 1.8 MMOL/L — SIGNIFICANT CHANGE UP (ref 0.5–2)
LYMPHOCYTES # BLD AUTO: 0.36 K/UL — LOW (ref 1–3.3)
LYMPHOCYTES # BLD AUTO: 7 % — LOW (ref 13–44)
MCHC RBC-ENTMCNC: 27.1 PG — SIGNIFICANT CHANGE UP (ref 27–34)
MCHC RBC-ENTMCNC: 31.3 GM/DL — LOW (ref 32–36)
MCV RBC AUTO: 86.7 FL — SIGNIFICANT CHANGE UP (ref 80–100)
MONOCYTES # BLD AUTO: 0.39 K/UL — SIGNIFICANT CHANGE UP (ref 0–0.9)
MONOCYTES NFR BLD AUTO: 7.6 % — SIGNIFICANT CHANGE UP (ref 2–14)
NEUTROPHILS # BLD AUTO: 4.36 K/UL — SIGNIFICANT CHANGE UP (ref 1.8–7.4)
NEUTROPHILS NFR BLD AUTO: 84.4 % — HIGH (ref 43–77)
NRBC # BLD: 0 /100 WBCS — SIGNIFICANT CHANGE UP (ref 0–0)
NRBC # FLD: 0.02 K/UL — HIGH (ref 0–0)
PCO2 BLDV: 57 MMHG — HIGH (ref 42–55)
PH BLDV: 7.41 — SIGNIFICANT CHANGE UP (ref 7.32–7.43)
PLATELET # BLD AUTO: 210 K/UL — SIGNIFICANT CHANGE UP (ref 150–400)
PO2 BLDV: 47 MMHG — HIGH (ref 25–45)
POTASSIUM BLDV-SCNC: 5.5 MMOL/L — HIGH (ref 3.5–5.1)
POTASSIUM SERPL-MCNC: 5.2 MMOL/L — SIGNIFICANT CHANGE UP (ref 3.5–5.3)
POTASSIUM SERPL-SCNC: 5.2 MMOL/L — SIGNIFICANT CHANGE UP (ref 3.5–5.3)
PROCALCITONIN SERPL-MCNC: 0.41 NG/ML — HIGH (ref 0.02–0.1)
PROT SERPL-MCNC: 7.3 G/DL — SIGNIFICANT CHANGE UP (ref 6–8.3)
PROTHROM AB SERPL-ACNC: 12.2 SEC — SIGNIFICANT CHANGE UP (ref 9.5–13)
RBC # BLD: 3.39 M/UL — LOW (ref 4.2–5.8)
RBC # FLD: 20.6 % — HIGH (ref 10.3–14.5)
SAO2 % BLDV: 80.9 % — SIGNIFICANT CHANGE UP (ref 67–88)
SODIUM SERPL-SCNC: 137 MMOL/L — SIGNIFICANT CHANGE UP (ref 135–145)
TROPONIN T, HIGH SENSITIVITY RESULT: 348 NG/L — CRITICAL HIGH
TROPONIN T, HIGH SENSITIVITY RESULT: 355 NG/L — CRITICAL HIGH
WBC # BLD: 5.16 K/UL — SIGNIFICANT CHANGE UP (ref 3.8–10.5)
WBC # FLD AUTO: 5.16 K/UL — SIGNIFICANT CHANGE UP (ref 3.8–10.5)

## 2024-07-29 PROCEDURE — 71045 X-RAY EXAM CHEST 1 VIEW: CPT | Mod: 26

## 2024-07-29 PROCEDURE — 70496 CT ANGIOGRAPHY HEAD: CPT | Mod: 26,MC

## 2024-07-29 PROCEDURE — 99291 CRITICAL CARE FIRST HOUR: CPT

## 2024-07-29 PROCEDURE — 0042T: CPT | Mod: MC

## 2024-07-29 PROCEDURE — 70498 CT ANGIOGRAPHY NECK: CPT | Mod: 26,MC

## 2024-07-29 RX ORDER — DEXTROSE 4 G
25 TABLET,CHEWABLE ORAL ONCE
Refills: 0 | Status: DISCONTINUED | OUTPATIENT
Start: 2024-07-29 | End: 2024-08-06

## 2024-07-29 RX ORDER — BACTERIOSTATIC SODIUM CHLORIDE 0.9 %
100 VIAL (ML) INJECTION
Refills: 0 | Status: DISCONTINUED | OUTPATIENT
Start: 2024-07-29 | End: 2024-08-06

## 2024-07-29 RX ORDER — DEXTROSE MONOHYDRATE, SODIUM CHLORIDE, SODIUM LACTATE, CALCIUM CHLORIDE, MAGNESIUM CHLORIDE 1.5; 538; 448; 18.4; 5.08 G/100ML; MG/100ML; MG/100ML; MG/100ML; MG/100ML
1000 SOLUTION INTRAPERITONEAL
Refills: 0 | Status: DISCONTINUED | OUTPATIENT
Start: 2024-07-29 | End: 2024-08-06

## 2024-07-29 RX ORDER — GLUCAGON INJECTION, SOLUTION 0.5 MG/.1ML
1 INJECTION, SOLUTION SUBCUTANEOUS ONCE
Refills: 0 | Status: DISCONTINUED | OUTPATIENT
Start: 2024-07-29 | End: 2024-08-06

## 2024-07-29 RX ORDER — VANCOMYCIN HYDROCHLORIDE 5 G/100ML
1000 INJECTION, POWDER, LYOPHILIZED, FOR SOLUTION INTRAVENOUS ONCE
Refills: 0 | Status: COMPLETED | OUTPATIENT
Start: 2024-07-29 | End: 2024-07-29

## 2024-07-29 RX ORDER — DEXTROSE 4 G
15 TABLET,CHEWABLE ORAL ONCE
Refills: 0 | Status: DISCONTINUED | OUTPATIENT
Start: 2024-07-29 | End: 2024-08-06

## 2024-07-29 RX ORDER — PIPERACILLIN SODIUM, TAZOBACTAM SODIUM 3; .375 G/15ML; G/15ML
3.38 INJECTION, POWDER, LYOPHILIZED, FOR SOLUTION INTRAVENOUS ONCE
Refills: 0 | Status: COMPLETED | OUTPATIENT
Start: 2024-07-29 | End: 2024-07-29

## 2024-07-29 RX ORDER — INSULIN LISPRO 100/ML
VIAL (ML) SUBCUTANEOUS EVERY 6 HOURS
Refills: 0 | Status: DISCONTINUED | OUTPATIENT
Start: 2024-07-29 | End: 2024-08-02

## 2024-07-29 RX ORDER — ASPIRIN 500 MG
300 TABLET ORAL DAILY
Refills: 0 | Status: DISCONTINUED | OUTPATIENT
Start: 2024-07-30 | End: 2024-07-31

## 2024-07-29 RX ORDER — DEXTROSE 4 G
12.5 TABLET,CHEWABLE ORAL ONCE
Refills: 0 | Status: DISCONTINUED | OUTPATIENT
Start: 2024-07-29 | End: 2024-08-06

## 2024-07-29 RX ORDER — HEPARIN SODIUM 1000 [USP'U]/ML
5000 INJECTION, SOLUTION INTRAVENOUS; SUBCUTANEOUS EVERY 12 HOURS
Refills: 0 | Status: DISCONTINUED | OUTPATIENT
Start: 2024-07-29 | End: 2024-08-06

## 2024-07-29 RX ORDER — CHLORHEXIDINE GLUCONATE 500 MG/1
1 CLOTH TOPICAL DAILY
Refills: 0 | Status: DISCONTINUED | OUTPATIENT
Start: 2024-07-29 | End: 2024-08-06

## 2024-07-29 RX ORDER — LATANOPROST 0.005 %
1 DROPS OPHTHALMIC (EYE) AT BEDTIME
Refills: 0 | Status: DISCONTINUED | OUTPATIENT
Start: 2024-07-29 | End: 2024-08-06

## 2024-07-29 RX ORDER — PIPERACILLIN SODIUM, TAZOBACTAM SODIUM 3; .375 G/15ML; G/15ML
3.38 INJECTION, POWDER, LYOPHILIZED, FOR SOLUTION INTRAVENOUS EVERY 12 HOURS
Refills: 0 | Status: DISCONTINUED | OUTPATIENT
Start: 2024-07-30 | End: 2024-07-31

## 2024-07-29 RX ORDER — PANTOPRAZOLE SODIUM 20 MG/1
40 TABLET, DELAYED RELEASE ORAL DAILY
Refills: 0 | Status: DISCONTINUED | OUTPATIENT
Start: 2024-07-29 | End: 2024-08-06

## 2024-07-29 RX ADMIN — PIPERACILLIN SODIUM, TAZOBACTAM SODIUM 200 GRAM(S): 3; .375 INJECTION, POWDER, LYOPHILIZED, FOR SOLUTION INTRAVENOUS at 17:38

## 2024-07-29 RX ADMIN — VANCOMYCIN HYDROCHLORIDE 250 MILLIGRAM(S): 5 INJECTION, POWDER, LYOPHILIZED, FOR SOLUTION INTRAVENOUS at 19:00

## 2024-07-29 NOTE — CONSULT NOTE ADULT - SUBJECTIVE AND OBJECTIVE BOX
[facility]ACT[facility]   MD MARIBELL CARRION MD ANGELA WONG, PA        TEL:  OFFICE: [phone]  From 5pm-7am answering service [phone]    --- INITIAL RENAL CONSULT NOTE ---date of service 07-29-24 @ 15:57    HPI:  69 year old M hx of ESRD on HD left AVF MWFr, CVA w/ gliosis, BKA/AKA functional quadriplegia, CAD, HTN, HLD, sent from dialysis unit for AMS. spoke with HCP Mrs Vincent states pt at baseline ~ A+ox2, recently at dialysis patient seems to be more confused A+Ox1. did not receive hd today due to mental status      Allergies:  No Known Allergies      PAST MEDICAL & SURGICAL HISTORY:  DM (diabetes mellitus)      HTN (hypertension)      Below knee amputation status, right      CKD (chronic kidney disease)      MRSA (methicillin resistant Staphylococcus aureus) colonization  (hx/o MRSA growth from wound culture)      Wound  (chronic wounds to left foot and leg)      DM (diabetes mellitus)      HTN (hypertension)      Kidney disease      S/P BKA (below knee amputation) unilateral, right      H/O peripheral artery bypass  left fem to below-knee pop with RSVG      Amputated left leg  above knee      Amputated right leg  below knee          Home Medications Reviewed    Hospital Medications:   MEDICATIONS  (STANDING):      SOCIAL HISTORY:  Denies ETOh, Smoking,     FAMILY HISTORY:  Family history of diabetes mellitus        REVIEW OF SYSTEMS:  unable to obtain    VITALS:  T(F): 98.3 (07-29-24 @ 14:16), Max: 98.3 (07-29-24 @ 14:16)  HR: 77 (07-29-24 @ 14:16)  BP: 159/76 (07-29-24 @ 14:16)  RR: 21 (07-29-24 @ 14:16)  SpO2: 100% (07-29-24 @ 14:16)  Wt(kg): --    Height (cm): 121.9 (07-29 @ 14:16)    PHYSICAL EXAM:  General: NAD  HEENT: anicteric sclera, oropharynx clear, MMM  Neck: No JVD  Respiratory: CTAB, no wheezes, rales or rhonchi  Cardiovascular: S1, S2, RRR  Gastrointestinal: BS+, soft, NT/ND  Extremities: b/l amputation   Neurological: A/O x 2  Psychiatric: Normal mood, normal affect  : No CVA tenderness. No cervantes.   Skin: No rashes  Vascular Access: avf    LABS:  07-29    137  |  93<L>  |  77<H>  ----------------------------<  223<H>  5.2   |  34<H>  |  4.93<H>    Ca    9.2      29 Jul 2024 14:30    TPro  7.3  /  Alb  3.2<L>  /  TBili  0.2  /  DBili      /  AST  45<H>  /  ALT  40  /  AlkPhos  143<H>  07-29    Creatinine Trend: 4.93 <--                        9.2    5.16  )-----------( 210      ( 29 Jul 2024 14:30 )             29.4     Urine Studies:  Urinalysis Basic - ( 29 Jul 2024 14:30 )    Color:  / Appearance:  / SG:  / pH:   Gluc: 223 mg/dL / Ketone:   / Bili:  / Urobili:    Blood:  / Protein:  / Nitrite:    Leuk Esterase:  / RBC:  / WBC    Sq Epi:  / Non Sq Epi:  / Bacteria:           RADIOLOGY & ADDITIONAL STUDIES:

## 2024-07-29 NOTE — ED ADULT NURSE NOTE - NSFALLFACTORS_ED_ALL_ED
Results communicated to patient via [employer]  Patient received treatment in clinic on 5/13/24  Return in 7-14 days [facility]
Confusion/AMS

## 2024-07-29 NOTE — ED ADULT NURSE REASSESSMENT NOTE - NS ED NURSE REASSESS COMMENT FT1
report received from day shift ED RN. pt sleeping in stretcher in NAD, RR even and unlabored, tolerating 2L NC well. maintained on cardiac monitor, NSR at this time. easily arousable to verbal stimuli, pt currently A&OX2, able to answer in short answers. iv abx completed, iv flushing well without complications, iv site WNL. pt to dialysis via stretcher, safety measures maintained, side rails up x2

## 2024-07-29 NOTE — ED PROVIDER NOTE - CLINICAL SUMMARY MEDICAL DECISION MAKING FREE TEXT BOX
Simba Mijares MD, PGY3  69-year-old male with past medical history of ESRD on hemodialysis Monday Wednesday Friday, chronic occipital lobe infarcts, CAD, hypertension, hyperlipidemia, dementia, recent admission for urosepsis earlier this month presenting to emergency department with decreased responsiveness noted at dialysis today.  Patient arrived at dialysis and was noted to be minimally responsive, diaphoretic.  EMS was called and patient was brought to emergency department.  Patient had similar presentation earlier this month when he was found to have urosepsis.  Patient also was seen by neurology for concern of seizures at that time, had EEG that showed nonspecific slowing but was not started on any medications.  No additional history able to be obtained from patient at this time.    In the emergency department patient hemodynamically stable, afebrile.  Stroke code called for patient.  Patient noted to be diaphoretic, slurring speech, minimally responsive.  Withdrawing upper extremities to pain.  Pupils equal round and reactive to light.  Patient received imaging, CTA head and neck that showed no acute intracranial hemorrhage or retrievable clot.  Lab work showing normal white blood cell count, elevated creatinine with elevated troponin.  EKG unchanged from priors.  Will repeat troponin, likely elevated secondary to chronic kidney disease.  Pending urinalysis to assess for urosepsis.  Will presumptively antibiosis for possible infectious etiologies.  Will require admission for further workup and evaluation for metabolic encephalopathy.

## 2024-07-29 NOTE — ED ADULT TRIAGE NOTE - AS TEMP SITE
FEROZ/Ben - MSG SENT TO PT.  Await pt. reply    Conversation: Reply Requested - Test Results -  (Newest Message First)  March 20, 2023  Me  to Robert Castanon LP      1:13 PM  Shyla Jones,     Per Dr. Krishnan,   Magnesium, Calcium,  and Vit D are low  Please let us know how much, Magnesium, Calcium, Vit D are you taking?     How many mgs of each and how many times a day?     Magnesium -  Calcium -  Vitamib D -      Further recommendations to follow.     Await your reply.     Thank you,  NICOL [facility]  Nephrology [facility]    
    Audit Farnam    Patient Portal User Last Read On   Robert Castanon 3/20/2023  3:28 PM     Notification    A notification will be sent if the message is not read by Tuesday March 28, 2023       
axillary

## 2024-07-29 NOTE — CONSULT NOTE ADULT - ASSESSMENT
69 year old M hx of ESRD on HD left AVF MWFr, CVA w/ gliosis, BKA/AKA functional quadriplegia, CAD, HTN, HLD, sent from dialysis unit for AMS. nephrology consulted for dialysis needs    ESRD:  On HD TIW via [facility].  Schedule is MWF. Consent obtained and charted from HCP Ramonita Vincent [phone]  hd today  - Renal diet.    AMS  s/p stroke code  f/u neuro    anemia  below goal   r/o stroke. f/u neuro  epo when cleared  monitor    htn  acceptable  monitor

## 2024-07-29 NOTE — CONSULT NOTE ADULT - ASSESSMENT
INCOMPLETE     NIHSS:  mRS: 5    Patient is not a thrombolytic candidate because there are no disabling focal deficits to treat compared to baseline.   Patient is not a mechanical thrombectomy candidate because no evidence of LVO.    Impression:             Patient case discussed with stroke fellow, Dr. Anderson, under supervision of stroke attending, Dr. Naylor.    Please call with questions: [phone]  69y (1955) man with a PMHx significant for ESRD on HD left AVF MWFr, OU blindness with bilateral occipital lobes chronic infarcts (on ASA 81 mg qd), BKA/AKA functional quadriplegia, CAD, HTN, HLD, Dementia, recent admission for unresponsiveness in early 7/2024 found to be in urosepsis, presents to [facility] ED as code stroke for unresponsiveness. LKW approximately 12 pm.  Before receiving dialysis, patient was found to be unresponsive to voice so EMS was called. Exam as above. CTH/CTA/CTP as above.     NIHSS: 13  mRS: 5    Patient is not a thrombolytic candidate because there are no disabling focal deficits to treat compared to baseline.   Patient is not a mechanical thrombectomy candidate because no evidence of LVO.    Impression: Unresponsiveness followed by dysarthria, diaphoresis, asterixis more likely due to diffuse cerebral dysfunction from toxic metabolic and/or infectious etiologies. Rule out non-motor seizure with impaired seizure. Consideration, but lower suspicion for acute ischemic stroke.     Recommendations     [] Consider CBC, CMP, Mg, Phos, TSH, FT4, Ammonia, Lactate, BCx, UCx, UA, CXR, RVP+COVID  [] MRI Brain  [] If no clinical improvement after addressing above, consider EEG    [] vEEG x 24 hours   [] If no improvement after correcting underlying derangements would pursue MRI brain w/o contrast   [] C/w home ASA 81 mg qd indefinitely  [] C/w home Atorvastatin 40 mg qhs   [] DVT prophylaxis  [] Okay to resume other home medications including for BP if no other contraindications  [] Neurochecks: q4hr   [] BP goals: No need for permissive HTN. C/w home medications and follow long term management goals for HTN. Avoid hypotension (<90/60 mmHg) if possible.   [] PT/OT - back to nursing facility when ready for discharge  [] Diet: Dysphagia screening otherwise swallow evaluation.   [] HgA1C goals < 7.0   [] Lifestyle modifications: smoking cessation, exercise, and a Mediterranean style diet.   [] if patient has a convulsion, please document accurately the length of the episode, and specifically what the patient was doing paying attention to eye opening vs closure, gaze deviation, shaking of extremities, tongue bite, urinary incontinence, any derangement of vital signs  [] If patient has a generalized tonic clonic episode for >3 minutes or significant derangement of vital signs may administer Ativan 2mg IV  [] Upon discharge, patient can follow up with Dr. Corona;   [address] [place] [address]. Call [phone].     Patient case discussed with stroke fellow, Dr. Anderson, under supervision of stroke attending, Dr. Naylor. Recommendations to be finalized upon morning rounds.     Please call with questions: [phone]  69y (1955) man with a PMHx significant for ESRD on HD left AVF MWFr, OU blindness with bilateral occipital lobes chronic infarcts (on ASA 81 mg qd), BKA/AKA functional quadriplegia, CAD, HTN, HLD, Dementia, recent admission for unresponsiveness in early 7/2024 found to be in urosepsis, presents to [facility] ED as code stroke for unresponsiveness. LKW approximately 12 pm.  Before receiving dialysis, patient was found to be unresponsive to voice so EMS was called. Exam as above. CTH/CTA/CTP as above.     NIHSS: 13  mRS: 5    Patient is not a thrombolytic candidate because there are no disabling focal deficits to treat compared to baseline.   Patient is not a mechanical thrombectomy candidate because no evidence of LVO.    Impression: Unresponsiveness followed by dysarthria, diaphoresis, asterixis more likely due to diffuse cerebral dysfunction from toxic metabolic and/or infectious etiologies. Rule out non-motor seizure with impaired seizure. Consideration, but lower suspicion for acute ischemic stroke.     Recommendations     [] Consider CBC, CMP, Mg, Phos, TSH, FT4, Ammonia, Lactate, BCx, UCx, UA, CXR, RVP+COVID  [] MRI Brain  [] If no clinical improvement after addressing above, consider EEG    [] vEEG x 24 hours   [] If no improvement after correcting underlying derangements would pursue MRI brain w/o contrast   [] C/w home ASA 81 mg qd indefinitely  [] C/w home Atorvastatin 40 mg qhs   [] DVT prophylaxis  [] Okay to resume other home medications including for BP if no other contraindications  [] Neurochecks: q4hr   [] BP goals: No need for permissive HTN. C/w home medications and follow long term management goals for HTN. Avoid hypotension (<90/60 mmHg) if possible.   [] PT/OT - back to nursing facility when ready for discharge  [] Diet: Dysphagia screening otherwise swallow evaluation.   [] HgA1C goals < 7.0   [] Lifestyle modifications: smoking cessation, exercise, and a Mediterranean style diet.   [] if patient has a convulsion, please document accurately the length of the episode, and specifically what the patient was doing paying attention to eye opening vs closure, gaze deviation, shaking of extremities, tongue bite, urinary incontinence, any derangement of vital signs  [] If patient has a generalized tonic clonic episode for >3 minutes or significant derangement of vital signs may administer Ativan 2mg IV  [] Fall Precautions  [] Seizure Precautions  [] Aspiration Precautions  [] Upon discharge, patient can follow up with Dr. Corona;   [address] [place] [address]. Call [phone].     Patient case discussed with stroke fellow, Dr. Anderson, under supervision of stroke attending, Dr. Naylor. Recommendations to be finalized upon morning rounds.     Please call with questions: [phone]  69y (1955) man with a PMHx significant for ESRD on HD left AVF MWFr, OU blindness with bilateral occipital lobes chronic infarcts (on ASA 81 mg qd), BKA/AKA functional quadriplegia, CAD, HTN, HLD, Dementia, recent admission for unresponsiveness in early 7/2024 found to be in urosepsis, presents to [facility] ED as code stroke for unresponsiveness. LKW approximately 12 pm.  Before receiving dialysis, patient was found to be unresponsive to voice so EMS was called.     NIHSS: 13  mRS: 5    Patient is not a thrombolytic candidate because there are no disabling focal deficits to treat compared to baseline.   Patient is not a mechanical thrombectomy candidate because no evidence of LVO.    Impression: Unresponsiveness followed by dysarthria, diaphoresis, asterixis more likely due to diffuse cerebral dysfunction from toxic metabolic and/or infectious etiologies. Rule out non-motor seizure with impaired seizure. Consideration, but lower suspicion for acute ischemic stroke.     Recommendations     [] Consider CBC, CMP, Mg, Phos, TSH, FT4, Ammonia, Lactate, BCx, UCx, UA, CXR, RVP+COVID  [] MRI Brain  [] If no clinical improvement after addressing above, consider EEG    [] vEEG x 24 hours   [] If no improvement after correcting underlying derangements would pursue MRI brain w/o contrast   [] C/w home ASA 81 mg qd indefinitely  [] C/w home Atorvastatin 40 mg qhs   [] DVT prophylaxis  [] Okay to resume other home medications including for BP if no other contraindications  [] Neurochecks: q4hr   [] BP goals: No need for permissive HTN. C/w home medications and follow long term management goals for HTN. Avoid hypotension (<90/60 mmHg) if possible.   [] PT/OT - back to nursing facility when ready for discharge  [] Diet: Dysphagia screening otherwise swallow evaluation.   [] HgA1C goals < 7.0   [] Lifestyle modifications: smoking cessation, exercise, and a Mediterranean style diet.   [] if patient has a convulsion, please document accurately the length of the episode, and specifically what the patient was doing paying attention to eye opening vs closure, gaze deviation, shaking of extremities, tongue bite, urinary incontinence, any derangement of vital signs  [] If patient has a generalized tonic clonic episode for >3 minutes or significant derangement of vital signs may administer Ativan 2mg IV  [] Fall Precautions  [] Seizure Precautions  [] Aspiration Precautions  [] Upon discharge, patient can follow up with Dr. Corona;   [address] [place] [address]. Call [phone].     Patient case discussed with stroke fellow, Dr. Anderson  Please call with questions: [phone]

## 2024-07-29 NOTE — ED ADULT NURSE REASSESSMENT NOTE - NS ED NURSE REASSESS COMMENT FT1
report given to [facility] RN, all questions answered. pt to be transported to [facility] after dialysis completed.

## 2024-07-29 NOTE — ED ADULT TRIAGE NOTE - CHIEF COMPLAINT QUOTE
coming from dialysis c/o altered mental status that started today. unknown last known normal. pt did not receive dialysis due to AMS. pt not responding in triage. Dr. Jennings in triage for stroke evaluation, code stroke called and pt brought directly to CT scan. hx of ESRD on dialysis (M/W/F), L AVF, HTN, DM (unknown type), BKA. .

## 2024-07-29 NOTE — ED PROVIDER NOTE - PHYSICAL EXAMINATION
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 When she was having seizures, these episodes were characterized as a variety of clinical patterns such as starring, focal, tonic-clonic. Before seizures, she felt funny and after seizures she had confusion, headache, and fatigue. Seizures lasted seconds to minutes. Patient has not experienced seizures for the past 5 years (i.e. seizures were well controlled on medication), and has gradually self-weaned off medication (Keppra and Topamax) over the past month. Patient presents now because mother expressed interest in establishing a baseline EEG without medication use. Notably, patient has also recently been experiencing nocturnal hypoglycemic episodes. Mom expresses concerns that nephrology related labs have been abnormal in the past, but she has not had labs done recently. No respiratory illness (cough/sneezing/congestion), no sick contacts or recent travel, no nausea/vomiting, no fever. No family history of epilepsy.      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 Patient has not experienced seizures for the past 5 years (i.e. seizures were well controlled on medication), and has gradually self-weaned off  Gen: minimally responsive  HEENT: EOMI PERRLA, mucous membranes moist  CV: RRR, +S1/S2, no M/R/G, 2+ radial pulses b/l  Resp: CTAB, no W/R/R, no accessory muscle use, no increased work of breathing  GI: Abdomen soft non-distended, NTTP  MSK: No open wounds, no bruising, no LE edema  Neuro: A&Ox1, slurring speech, minimally responsive.  Withdrawing upper extremities to pain. minor drift in b/l upper extremities.   Psych: appropriate mood medication (Keppra and Topamax) over the past month. Mother expressed interest in establishing a baseline EEG without medication use. Patient remained stable during hospital course. No seizures captured on EEG. Discharged home on 05/10/23 with no antiepileptic medication.     Physical Exam  Constitutional:       General: She is not in acute distress.     Appearance: She is not ill-appearing or toxic-appearing.   HENT:      Head: Atraumatic.      Right Ear: External ear normal.      Left Ear: External ear normal.      Nose: Nose normal.      Mouth/Throat:      Mouth: Mucous membranes are moist.   Eyes:      Conjunctiva/sclera: Conjunctivae normal.   Cardiovascular:      Rate and Rhythm: Normal rate and regular rhythm.      Pulses: Normal pulses.      Heart sounds: Normal heart sounds.   Pulmonary:      Effort: Pulmonary effort is normal. No respiratory distress.      Breath sounds: Normal breath sounds. No wheezing.   Abdominal:      General: Abdomen is flat. Bowel sounds are normal. There is no distension.      Palpations: Abdomen is soft.      Tenderness: There is no abdominal tenderness.   Musculoskeletal:      Cervical back: Neck supple.   Skin:     General: Skin is warm.      Capillary Refill: Capillary refill takes less than 2 seconds.      Findings: No rash.   Neurological:      Mental Status: She is alert.      Sensory: No sensory deficit.      Motor: No weakness.         Goals of Care Treatment Preferences:             Significant Labs:   Recent Lab Results     None          Significant Imaging: None    Pending Diagnostic Studies:     None        Final Active Diagnoses:    Diagnosis Date Noted POA    PRINCIPAL PROBLEM:  Epilepsy [G40.909] 07/10/2018 Yes      Problems Resolved During this Admission:         Discharged Condition: good    Disposition: Home or Self Care    Follow Up:    Patient Instructions:      Notify your health care provider if you experience any of the following:  temperature >100.4      Notify your health care provider if you experience any of the following:  severe persistent headache     Notify your health care provider if you experience any of the following:  persistent dizziness, light-headedness, or visual disturbances     Notify your health care provider if you experience any of the following:  increased confusion or weakness     Medications:  Reconciled Home Medications:      Medication List      CHANGE how you take these medications    fluticasone propionate 50 mcg/actuation nasal spray  Commonly known as: FLONASE  1 spray by Each Nostril route once daily.  What changed: Another medication with the same name was removed. Continue taking this medication, and follow the directions you see here.        CONTINUE taking these medications    albuterol 2.5 mg /3 mL (0.083 %) nebulizer solution  Commonly known as: PROVENTIL  Take 2.5 mg by nebulization every 4 to 6 hours as needed for Wheezing. Rescue     cetirizine 10 MG tablet  Commonly known as: ZYRTEC  Take 10 mg by mouth daily as needed for Allergies.     melatonin 10 mg Tab  Take 10 mg by mouth nightly as needed (sleep).     vitamin D 1000 units Tab  Commonly known as: VITAMIN D3  Take 2,000 Units by mouth once daily.        STOP taking these medications    omeprazole 20 MG capsule  Commonly known as: PRILOSEC     ondansetron 4 MG tablet  Commonly known as: ZOFRAN     polyethylene glycol 17 gram Pwpk  Commonly known as: GLYCOLAX     sertraline 50 MG tablet  Commonly known as: ZOLOFT     STOOL SOFTENER-LAXATIVE 8.6-50 mg per tablet  Generic drug: senna-docusate 8.6-50 mg          Time spent on the discharge of patient: 30 minutes    Vaishnavi Restrepo MD  Pediatric Neurology  [facility] - Pediatric Acute Care

## 2024-07-29 NOTE — ED ADULT NURSE NOTE - OBJECTIVE STATEMENT
Chris RN: patient alert and oriented x4 non-ambulatory, patient witk L AKA and R BKA, c/o altered mental status that started today. patient with unknown last normal. patient did not receive dialysis due to AMS and was transported here. patient placed on continuous cardiac monitor, 20G IV in R arm, labs sent. patient with L AVF past medical history of ESRD on dialysis (M/W/F), HTN, DM (unknown type), BKA. report given to primary RN Cedric

## 2024-07-29 NOTE — ED ADULT NURSE NOTE - NSFALLHARMRISKINTERV_ED_ALL_ED
Assistance OOB with selected safe patient handling equipment if applicable/Assistance with ambulation/Communicate risk of Fall with Harm to all staff, patient, and family/Monitor gait and stability/Monitor for mental status changes and reorient to person, place, and time, as needed/Provide visual cue: red socks, yellow wristband, yellow gown, etc/Reinforce activity limits and safety measures with patient and family/Toileting schedule using arm’s reach rule for commode and bathroom/Use of alarms - bed, stretcher, chair and/or video monitoring/Bed in lowest position, wheels locked, appropriate side rails in place/Call bell, personal items and telephone in reach/Instruct patient to call for assistance before getting out of bed/chair/stretcher/Non-slip footwear applied when patient is off stretcher/[place] to call system/Physically safe environment - no spills, clutter or unnecessary equipment/Purposeful Proactive Rounding/Room/bathroom lighting operational, light cord in reach

## 2024-07-29 NOTE — CONSULT NOTE ADULT - SUBJECTIVE AND OBJECTIVE BOX
INCOMPLETE   HPI:    (Stroke only)  NIHSS: 13  MRS: 5     REVIEW OF SYSTEMS    A 10-system ROS was performed and is negative except for those items noted above and/or in the HPI.    PAST MEDICAL & SURGICAL HISTORY:  DM (diabetes mellitus)      HTN (hypertension)      Below knee amputation status, right      CKD (chronic kidney disease)      MRSA (methicillin resistant Staphylococcus aureus) colonization  (hx/o MRSA growth from wound culture)      Wound  (chronic wounds to left foot and leg)      DM (diabetes mellitus)      HTN (hypertension)      Kidney disease      S/P BKA (below knee amputation) unilateral, right      H/O peripheral artery bypass  left fem to below-knee pop with RSVG      Amputated left leg  above knee      Amputated right leg  below knee        FAMILY HISTORY:  Family history of diabetes mellitus      SOCIAL HISTORY:   T/E/D:   Occupation:   Lives with:     MEDICATIONS (HOME):  Home Medications:  ascorbic acid 500 mg oral capsule: 1 cap(s) orally once a day (04 Jul 2024 01:02)  aspirin 81 mg oral capsule: 1 cap(s) orally once a day (04 Jul 2024 01:01)  carvedilol 6.25 mg oral tablet: 1 tab(s) orally 2 times a day (04 Jul 2024 01:02)  centrum daily:  (04 Jul 2024 01:02)  epoetin dinah: 6,000 unit(s) intravenous 3 times a week 6000U Monday Wednesday Friday intradialysis (09 Jul 2024 12:45)  lactulose 10 g/15 mL oral syrup: 10 milliliter(s) orally once a day (04 Jul 2024 01:02)  latanoprost 0.005% ophthalmic emulsion: 1 drop(s) in each affected eye once a day (at bedtime) both eyes (04 Jul 2024 01:02)  Lipitor 40 mg oral tablet: 1 tab(s) orally once a day (04 Jul 2024 01:02)  midodrine: 7.5 milligram(s) orally 3 times a week three times a week before dialysis (09 Jul 2024 14:59)  mirtazapine 7.5 mg oral tablet: 1 tab(s) orally once a day (at bedtime) (04 Jul 2024 01:02)  ondansetron 4 mg oral tablet: 1 tab(s) orally every 8 hours for nausea/vomiting (04 Jul 2024 01:02)  Polyethylene Glycol 3350: 1 application once a day once a day (09 Jul 2024 12:06)  prednisoLONE ophthalmic: 1 application 2 times a day 1% BID (09 Jul 2024 12:06)  Protonix 40 mg oral delayed release tablet: 1 tab(s) orally once a day (04 Jul 2024 01:02)  senna (sennosides) 8.6 mg oral tablet: 2 tab(s) orally once a day (04 Jul 2024 01:01)  Tylenol 325 mg oral capsule: 1 cap(s) orally every 6 hours as needed (04 Jul 2024 01:01)    MEDICATIONS  (STANDING):    MEDICATIONS  (PRN):    ALLERGIES/INTOLERANCES:  Allergies  No Known Allergies    Intolerances    VITALS & EXAMINATION:  Vital Signs Last 24 Hrs  T(C): 36.8 (29 Jul 2024 14:16), Max: 36.8 (29 Jul 2024 14:16)  T(F): 98.3 (29 Jul 2024 14:16), Max: 98.3 (29 Jul 2024 14:16)  HR: 77 (29 Jul 2024 14:16) (77 - 77)  BP: 159/76 (29 Jul 2024 14:16) (159/76 - 159/76)  BP(mean): --  RR: 21 (29 Jul 2024 14:16) (21 - 21)  SpO2: 100% (29 Jul 2024 14:16) (100% - 100%)    Parameters below as of 29 Jul 2024 14:16  Patient On (Oxygen Delivery Method): nasal cannula  O2 Flow (L/min): 2      General:  Constitutional: Male, appears stated age, in no apparent distress including pain  Head: Normocephalic & Atraumatic.  Respiratory: Breathing comfortably.    Neurological:  MS: Eyes closed, open to noxious stimuli. Awake, not alert, not able to clearly state orientation questions initially. On reassessment, he is not oriented to place, age, month, year. Occasionally making loud cooing noises.     Language: Speech is garbled and difficult to understand, but can make out several words such as "I don't know what's wrong."    CNs: PERRL (R = 3mm, L = 3mm). No blink to threat b/l; patient able to make out shapes, but not objects or count fingers. Dysconjugate gaze w/ exodeviation. No facial asymmetry initially, on reassessment smile is symmetric. Moderate to severe dysarthria.     Motor: Normal muscle bulk & tone. Bilateral UE drifts, R>L, however there appears to be intermittent loss of tone consistent with asterixis.     Sensation: Grimaces symmetrically to noxious stimuli b/l.     Coordination: unable to assess due to visual acuity baseline.     Gait: Patient unable to assess at baseline.         LABORATORY:  CBC                       9.2    5.16  )-----------( 210      ( 29 Jul 2024 14:30 )             29.4     Chem 07-29    137  |  93<L>  |  77<H>  ----------------------------<  223<H>  5.2   |  34<H>  |  4.93<H>    Ca    9.2      29 Jul 2024 14:30    TPro  7.3  /  Alb  3.2<L>  /  TBili  0.2  /  DBili  x   /  AST  45<H>  /  ALT  40  /  AlkPhos  143<H>  07-29    LFTs LIVER FUNCTIONS - ( 29 Jul 2024 14:30 )  Alb: 3.2 g/dL / Pro: 7.3 g/dL / ALK PHOS: 143 U/L / ALT: 40 U/L / AST: 45 U/L / GGT: x           Coagulopathy PT/INR - ( 29 Jul 2024 14:30 )   PT: 12.2 sec;   INR: 1.08 ratio         PTT - ( 29 Jul 2024 14:30 )  PTT:31.9 sec  Lipid Panel   A1c   Cardiac enzymes     U/A Urinalysis Basic - ( 29 Jul 2024 14:30 )    Color: x / Appearance: x / SG: x / pH: x  Gluc: 223 mg/dL / Ketone: x  / Bili: x / Urobili: x   Blood: x / Protein: x / Nitrite: x   Leuk Esterase: x / RBC: x / WBC x   Sq Epi: x / Non Sq Epi: x / Bacteria: x      CSF  Immunological  Other    STUDIES & IMAGING:  Studies (EKG, EEG, EMG, etc):     Radiology (XR, CT, MR, U/S, TTE/ISRAEL):    CTH/CTA/CTP:    There are chronic infarcts in both PCA territories and in the cerebellum   bilaterally, unchanged. There is also a chronic infarct at the left   superior frontal gyrus. There are small chronic white matter infarcts in   the right corona radiata and centrum semiovale, better seen on theprior   MRI.    A hyperdense mass is noted at the left CPA. There are dense   calcifications at the interhemispheric fissure and in the left middle   frontal gyrus. There are extensive vascular calcifications within the ECA   branches in the soft tissues of the scalp. Accounting for differences in   technique, all of these findings appear unchanged.    Moderate generalized cerebral volume loss, with distention of the sulci   and concomitant ex-vacuo ventricular dilatation. Moderate to severe   nonspecific low attenuation in the periventricular and subcortical white   matter, likely due to small vessel disease.    No acute intracranial hemorrhage. No midline shift or herniation.    The visualized sinuses and mastoids are clear. Left lens implant. Limited   views of the orbits and visualized soft tissues of the neck, face, scalp,   skull base, and calvarium are otherwise unremarkable.    Using a threshold of 30%, there is an rCBF defect in the right PCA   territory measuring 5 mL, corresponding to a chronic infarct on the CT.   Using a threshold of 6s, there is a matching Tmax abnormality in the   right PCA territory measuring 5 mL. No evidence of a perfusion mismatch   to suggest brain tissue at risk, although MRI would be more sensitive for   acute ischemia.    There is calcified atheromatous plaque at the great vessel origins, with   mild narrowing less than 10%. The left vertebral artery arises directly   from the arch and is congenitally hypoplastic. There is calcified plaque   at the origin of the dominant right vertebral artery and at the right   V2/V3 segment, with mild narrowing.    The carotid bifurcations are not well seen due to patient   motion/swallowing artifact. On the right, there is heavily calcified   plaque with 40-50% stenosis of the proximal right ICA. On the left, there   is calcified plaque at the carotid bifurcation and ICA origin with less   than 10% narrowing.    Otherwise, the right and left ICAs, CCAs, vertebrals, subclavians, and   the brachiocephalic artery are negative. No ulcerated plaque or arterial   dissection.    Intracranially, there is calcified plaque with moderate right and   moderate to severe left V4 vertebral segment stenoses. The left vertebral   artery probably ends in a PICA, without joining the basilar. The basilar   artery is diffusely hypoplastic and at least moderately stenosed.   Correlate clinically for vertebrobasilar insufficiency.    There is heavily calcified plaque with mild to moderate narrowing of the   C4-C6 ICA segments bilaterally. There is fetal origin of the right PCA.   There are mild to moderate stenotic lesions in the M1 segment of the   right MCA and in the PCOM segment of the fetal right PCA. There is   heavily calcified plaque with moderate tosevere narrowing in both A2   segments distally. There is adequate runoff, so this could be   artifactually accentuated.    No intracranial aneurysms or large vessel occlusions. No large feeding   arteries or draining veins. The ACAs, MCAs, PCAs, andcarotid siphons are   otherwise negative. The basilar artery and V4 vertebral segments are   otherwise negative. Abnormalities of  vessels and distal   intracranial branches are beyond the resolution of this technique, and   catheter angiography would be more sensitive.    The dural venous sinuses are grossly patent, although this examination   wasn't optimized to evaluate the cerebral veins. Mild degenerative   changes are noted in the cervical spine. No gross evidence of an acute   fracture, although this examination wasn't optimized to evaluate the   spine. There are bilateral pleural effusions, with passive atelectasis.   There is septal thickening at the lung apices.    IMPRESSION:    1.  No acute intracranial hemorrhage or acute retrievable clot.  2.  MRI would be more sensitive for acute ischemia.  3.  Stable chronic infarcts, senescent cerebral changes, and hyperdense   mass at the left CPA.  4.  Proximal right ICA stenosis, 40-50%.  5.  Multifocal stenotic lesions in the basilar artery and both V4   vertebral segments.  6.  Correlate clinically for vertebrobasilar insufficiency.  7.  Heavily calcified chronic plaque with moderate to severe bilateral   DENISE stenoses.   HPI: 69y (1955) man with a PMHx significant for ESRD on HD left AVF MWFr, OU blindness with bilateral occipital lobes chronic infarcts (on ASA 81 mg qd), BKA/AKA functional quadriplegia, CAD, HTN, HLD, Dementia, recent admission for unresponsiveness in early 7/2024 found to be in urosepsis, presents to [facility] ED as code stroke for unresponsiveness. LKW approximately 12 pm. Spoke with one of patients nurses at the nursing home where he resides who reports he was in his normal state of health this morning prior to going to his dialysis session. Before receiving dialysis, patient was found to be unresponsive to voice so EMS was called. No focal deficits noted on their exam en route to ED. While in ED, patient awoke approximately 5 minutes into the evaluation but his speech was unintelligible. He also made intermittent cooing sounds which were apparently "normal for him" per his nurse and happens sometimes. Normally, patient is fully dependent for ADLs and bedridden. He is able to make his needs known to nursing staff. He is unable to see. This episode of unresponsiveness is similar to the one earlier this month; he was followed by neurology and had EEG/MRI performed without convincing, acute findings of seizure or stroke respectively.     (Stroke only)  NIHSS: 13  MRS: 5     REVIEW OF SYSTEMS    A 10-system ROS was performed and is negative except for those items noted above and/or in the HPI.    PAST MEDICAL & SURGICAL HISTORY:  DM (diabetes mellitus)      HTN (hypertension)      Below knee amputation status, right      CKD (chronic kidney disease)      MRSA (methicillin resistant Staphylococcus aureus) colonization  (hx/o MRSA growth from wound culture)      Wound  (chronic wounds to left foot and leg)      DM (diabetes mellitus)      HTN (hypertension)      Kidney disease      S/P BKA (below knee amputation) unilateral, right      H/O peripheral artery bypass  left fem to below-knee pop with RSVG      Amputated left leg  above knee      Amputated right leg  below knee        FAMILY HISTORY:  Family history of diabetes mellitus      SOCIAL HISTORY:   T/E/D:   Occupation:   Lives with:     MEDICATIONS (HOME):  Home Medications:  ascorbic acid 500 mg oral capsule: 1 cap(s) orally once a day (04 Jul 2024 01:02)  aspirin 81 mg oral capsule: 1 cap(s) orally once a day (04 Jul 2024 01:01)  carvedilol 6.25 mg oral tablet: 1 tab(s) orally 2 times a day (04 Jul 2024 01:02)  centrum daily:  (04 Jul 2024 01:02)  epoetin dinah: 6,000 unit(s) intravenous 3 times a week 6000U Monday Wednesday Friday intradialysis (09 Jul 2024 12:45)  lactulose 10 g/15 mL oral syrup: 10 milliliter(s) orally once a day (04 Jul 2024 01:02)  latanoprost 0.005% ophthalmic emulsion: 1 drop(s) in each affected eye once a day (at bedtime) both eyes (04 Jul 2024 01:02)  Lipitor 40 mg oral tablet: 1 tab(s) orally once a day (04 Jul 2024 01:02)  midodrine: 7.5 milligram(s) orally 3 times a week three times a week before dialysis (09 Jul 2024 14:59)  mirtazapine 7.5 mg oral tablet: 1 tab(s) orally once a day (at bedtime) (04 Jul 2024 01:02)  ondansetron 4 mg oral tablet: 1 tab(s) orally every 8 hours for nausea/vomiting (04 Jul 2024 01:02)  Polyethylene Glycol 3350: 1 application once a day once a day (09 Jul 2024 12:06)  prednisoLONE ophthalmic: 1 application 2 times a day 1% BID (09 Jul 2024 12:06)  Protonix 40 mg oral delayed release tablet: 1 tab(s) orally once a day (04 Jul 2024 01:02)  senna (sennosides) 8.6 mg oral tablet: 2 tab(s) orally once a day (04 Jul 2024 01:01)  Tylenol 325 mg oral capsule: 1 cap(s) orally every 6 hours as needed (04 Jul 2024 01:01)    MEDICATIONS  (STANDING):    MEDICATIONS  (PRN):    ALLERGIES/INTOLERANCES:  Allergies  No Known Allergies    Intolerances    VITALS & EXAMINATION:  Vital Signs Last 24 Hrs  T(C): 36.8 (29 Jul 2024 14:16), Max: 36.8 (29 Jul 2024 14:16)  T(F): 98.3 (29 Jul 2024 14:16), Max: 98.3 (29 Jul 2024 14:16)  HR: 77 (29 Jul 2024 14:16) (77 - 77)  BP: 159/76 (29 Jul 2024 14:16) (159/76 - 159/76)  BP(mean): --  RR: 21 (29 Jul 2024 14:16) (21 - 21)  SpO2: 100% (29 Jul 2024 14:16) (100% - 100%)    Parameters below as of 29 Jul 2024 14:16  Patient On (Oxygen Delivery Method): nasal cannula  O2 Flow (L/min): 2      General:  Constitutional: Male, appears stated age, in no apparent distress including pain  Head: Normocephalic & Atraumatic.  Respiratory: Breathing comfortably.    Neurological:  MS: Eyes closed, open to noxious stimuli. Awake, not alert, not able to clearly state orientation questions initially. On reassessment, he is not oriented to place, age, month, year. Occasionally making loud cooing noises.     Language: Speech is garbled and difficult to understand, but can make out several words such as "I don't know what's wrong."    CNs: PERRL (R = 3mm, L = 3mm). No blink to threat b/l; patient able to make out shapes, but not objects or count fingers. Dysconjugate gaze w/ exodeviation. No facial asymmetry initially, on reassessment smile is symmetric. Moderate to severe dysarthria.     Motor: Normal muscle bulk & tone. Bilateral UE drifts, R>L, however there appears to be intermittent loss of tone consistent with asterixis. 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     Sensation: Grimaces symmetrically to noxious stimuli b/l.     Coordination: unable to assess due to visual acuity baseline.     Gait: Patient unable to assess at baseline.         LABORATORY:  CBC                       9.2    5.16  )-----------( 210      ( 29 Jul 2024 14:30 )             29.4     Chem 07-29    137  |  93<L>  |  77<H>  ----------------------------<  223<H>  5.2   |  34<H>  |  4.93<H>    Ca    9.2      29 Jul 2024 14:30    TPro  7.3  /  Alb  3.2<L>  /  TBili  0.2  /  DBili  x   /  AST  45<H>  /  ALT  40  /  AlkPhos  143<H>  07-29    LFTs LIVER FUNCTIONS - ( 29 Jul 2024 14:30 )  Alb: 3.2 g/dL / Pro: 7.3 g/dL / ALK PHOS: 143 U/L / ALT: 40 U/L / AST: 45 U/L / GGT: x           Coagulopathy PT/INR - ( 29 Jul 2024 14:30 )   PT: 12.2 sec;   INR: 1.08 ratio         PTT - ( 29 Jul 2024 14:30 )  PTT:31.9 sec  Lipid Panel   A1c   Cardiac enzymes     U/A Urinalysis Basic - ( 29 Jul 2024 14:30 )    Color: x / Appearance: x / SG: x / pH: x  Gluc: 223 mg/dL / Ketone: x  / Bili: x / Urobili: x   Blood: x / Protein: x / Nitrite: x   Leuk Esterase: x / RBC: x / WBC x   Sq Epi: x / Non Sq Epi: x / Bacteria: x      CSF  Immunological  Other    STUDIES & IMAGING:  Studies (EKG, EEG, EMG, etc):     Radiology (XR, CT, MR, U/S, TTE/ISRAEL):    CTH/CTA/CTP:    There are chronic infarcts in both PCA territories and in the cerebellum   bilaterally, unchanged. There is also a chronic infarct at the left   superior frontal gyrus. There are small chronic white matter infarcts in   the right corona radiata and centrum semiovale, better seen on theprior   MRI.    A hyperdense mass is noted at the left CPA. There are dense   calcifications at the interhemispheric fissure and in the left middle   frontal gyrus. There are extensive vascular calcifications within the ECA   branches in the soft tissues of the scalp. Accounting for differences in   technique, all of these findings appear unchanged.    Moderate generalized cerebral volume loss, with distention of the sulci   and concomitant ex-vacuo ventricular dilatation. Moderate to severe   nonspecific low attenuation in the periventricular and subcortical white   matter, likely due to small vessel disease.    No acute intracranial hemorrhage. No midline shift or herniation.    The visualized sinuses and mastoids are clear. Left lens implant. Limited   views of the orbits and visualized soft tissues of the neck, face, scalp,   skull base, and calvarium are otherwise unremarkable.    Using a threshold of 30%, there is an rCBF defect in the right PCA   territory measuring 5 mL, corresponding to a chronic infarct on the CT.   Using a threshold of 6s, there is a matching Tmax abnormality in the   right PCA territory measuring 5 mL. No evidence of a perfusion mismatch   to suggest brain tissue at risk, although MRI would be more sensitive for   acute ischemia.    There is calcified atheromatous plaque at the great vessel origins, with   mild narrowing less than 10%. The left vertebral artery arises directly   from the arch and is congenitally hypoplastic. There is calcified plaque   at the origin of the dominant right vertebral artery and at the right   V2/V3 segment, with mild narrowing.    The carotid bifurcations are not well seen due to patient   motion/swallowing artifact. On the right, there is heavily calcified   plaque with 40-50% stenosis of the proximal right ICA. On the left, there   is calcified plaque at the carotid bifurcation and ICA origin with less   than 10% narrowing.    Otherwise, the right and left ICAs, CCAs, vertebrals, subclavians, and   the brachiocephalic artery are negative. No ulcerated plaque or arterial   dissection.    Intracranially, there is calcified plaque with moderate right and   moderate to severe left V4 vertebral segment stenoses. The left vertebral   artery probably ends in a PICA, without joining the basilar. The basilar   artery is diffusely hypoplastic and at least moderately stenosed.   Correlate clinically for vertebrobasilar insufficiency.    There is heavily calcified plaque with mild to moderate narrowing of the   C4-C6 ICA segments bilaterally. There is fetal origin of the right PCA.   There are mild to moderate stenotic lesions in the M1 segment of the   right MCA and in the PCOM segment of the fetal right PCA. There is   heavily calcified plaque with moderate tosevere narrowing in both A2   segments distally. There is adequate runoff, so this could be   artifactually accentuated.    No intracranial aneurysms or large vessel occlusions. No large feeding   arteries or draining veins. The ACAs, MCAs, PCAs, andcarotid siphons are   otherwise negative. The basilar artery and V4 vertebral segments are   otherwise negative. Abnormalities of  vessels and distal   intracranial branches are beyond the resolution of this technique, and   catheter angiography would be more sensitive.    The dural venous sinuses are grossly patent, although this examination   wasn't optimized to evaluate the cerebral veins. Mild degenerative   changes are noted in the cervical spine. No gross evidence of an acute   fracture, although this examination wasn't optimized to evaluate the   spine. There are bilateral pleural effusions, with passive atelectasis.   There is septal thickening at the lung apices.    IMPRESSION:    1.  No acute intracranial hemorrhage or acute retrievable clot.  2.  MRI would be more sensitive for acute ischemia.  3.  Stable chronic infarcts, senescent cerebral changes, and hyperdense   mass at the left CPA.  4.  Proximal right ICA stenosis, 40-50%.  5.  Multifocal stenotic lesions in the basilar artery and both V4   vertebral segments.  6.  Correlate clinically for vertebrobasilar insufficiency.  7.  Heavily calcified chronic plaque with moderate to severe bilateral   DENISE stenoses.        MRI brain w/w/o contrast (Epilepsy protocol) 7/2024:    1. Unchanged left-sided CP angle vestibular schwannoma.    2. Multiple unchanged chronic intracranial findings, as discussed.    3. Similar-appearing extensive chronic white matter microvascular type   changes.    4. No evidence of acute intracranial hemorrhage or acute cerebral   ischemia.    EEG x 24 hours 7/2024:    Mild bilateral temporal focal cerebral dysfunction can be structural or functional in etiology.  No epileptiform abnormalities or seizures.

## 2024-07-29 NOTE — CONSULT NOTE ADULT - CRITICAL CARE ATTENDING COMMENT
69M with ESRD on HD left AVF MWFr, OU blindness with bilateral occipital lobes chronic infarcts (on ASA 81 mg qd), BKA/AKA functional quadriplegia, CAD, HTN, HLD, Dementia, recent admission for unresponsiveness in early 7/2024 found to be in urosepsis, presents to [facility] ED as code stroke for unresponsiveness. LKW approximately 12 pm.  Before receiving dialysis, patient was found to be unresponsive to voice so EMS was called.  NIHSS 13 initially  MRS 5  Now AAOx 2 on exam but lethargic  CTH with known chronic infarcts, L CPA mass   CTA with known posterior circulation stenosis   Deemed not tnk/thrombectomy candidate  Symptoms likely due to TME due to numerous metabolic derangements, low suspicion for new stroke   Agree with plan as above  Aspirin on hold for now given anemia - restart when stable per primary team  Can consider MRI iif not back to baseline but would hold off for now

## 2024-07-29 NOTE — ED PROVIDER NOTE - ATTENDING CONTRIBUTION TO CARE
Agree with resident note  69-year-old female with history of end-stage renal disease on hemodialysis Monday Wednesday Friday, chronic occipital lobe infarcts, CAD, hypertension, dementia, recent admission for urosepsis presents to the emergency room for decreased responsiveness.  Patient was at dialysis was noted to be diaphoretic and minimally responsive so EMS was called.  Patient on prior visit had similar presentation and was found to be uroseptic.  Was also seen by neurology for concern for seizures with EEG showing no specific findings.  Stroke code called given patient initial presentation of slurred speech, altered mental status  Physical exam  Minimally responsive  Vital signs stable  Clear to auscultation bilaterally  S1-S2 murmurs rubs or gallops  Abdomen soft nontender nondistended  Extremities no edema  Neuro minimal exam, moving all extremities  Impression  Likely sepsis but given presentation will rule out stroke, neuro at bedside will get CT CTA head and neck, infectious workup and reassessment will need to be admitted

## 2024-07-30 DIAGNOSIS — G92.8 OTHER TOXIC ENCEPHALOPATHY: ICD-10-CM

## 2024-07-30 DIAGNOSIS — I10 ESSENTIAL (PRIMARY) HYPERTENSION: ICD-10-CM

## 2024-07-30 DIAGNOSIS — N18.6 END STAGE RENAL DISEASE: ICD-10-CM

## 2024-07-30 DIAGNOSIS — R63.8 OTHER SYMPTOMS AND SIGNS CONCERNING FOOD AND FLUID INTAKE: ICD-10-CM

## 2024-07-30 DIAGNOSIS — J90 PLEURAL EFFUSION, NOT ELSEWHERE CLASSIFIED: ICD-10-CM

## 2024-07-30 DIAGNOSIS — L89.159 PRESSURE ULCER OF SACRAL REGION, UNSPECIFIED STAGE: ICD-10-CM

## 2024-07-30 LAB
ALBUMIN SERPL ELPH-MCNC: 2.1 G/DL — LOW (ref 3.3–5)
ALP SERPL-CCNC: 72 U/L — SIGNIFICANT CHANGE UP (ref 40–120)
ALT FLD-CCNC: 23 U/L — SIGNIFICANT CHANGE UP (ref 4–41)
ANION GAP SERPL CALC-SCNC: 11 MMOL/L — SIGNIFICANT CHANGE UP (ref 7–14)
ANION GAP SERPL CALC-SCNC: 24 MMOL/L — HIGH (ref 7–14)
AST SERPL-CCNC: 24 U/L — SIGNIFICANT CHANGE UP (ref 4–40)
BASOPHILS # BLD AUTO: 0.01 K/UL — SIGNIFICANT CHANGE UP (ref 0–0.2)
BASOPHILS NFR BLD AUTO: 0.4 % — SIGNIFICANT CHANGE UP (ref 0–2)
BILIRUB SERPL-MCNC: <0.2 MG/DL — SIGNIFICANT CHANGE UP (ref 0.2–1.2)
BUN SERPL-MCNC: 34 MG/DL — HIGH (ref 7–23)
BUN SERPL-MCNC: 43 MG/DL — HIGH (ref 7–23)
CALCIUM SERPL-MCNC: 6.5 MG/DL — CRITICAL LOW (ref 8.4–10.5)
CALCIUM SERPL-MCNC: 8.8 MG/DL — SIGNIFICANT CHANGE UP (ref 8.4–10.5)
CHLORIDE SERPL-SCNC: 77 MMOL/L — LOW (ref 98–107)
CHLORIDE SERPL-SCNC: 98 MMOL/L — SIGNIFICANT CHANGE UP (ref 98–107)
CK MB BLD-MCNC: 1.9 % — SIGNIFICANT CHANGE UP (ref 0–2.5)
CK MB CFR SERPL CALC: 1.4 NG/ML — SIGNIFICANT CHANGE UP
CK SERPL-CCNC: 74 U/L — SIGNIFICANT CHANGE UP (ref 30–200)
CO2 SERPL-SCNC: 22 MMOL/L — SIGNIFICANT CHANGE UP (ref 22–31)
CO2 SERPL-SCNC: 30 MMOL/L — SIGNIFICANT CHANGE UP (ref 22–31)
CREAT SERPL-MCNC: 2.42 MG/DL — HIGH (ref 0.5–1.3)
CREAT SERPL-MCNC: 3.21 MG/DL — HIGH (ref 0.5–1.3)
EGFR: 20 ML/MIN/1.73M2 — LOW
EGFR: 28 ML/MIN/1.73M2 — LOW
EOSINOPHIL # BLD AUTO: 0.04 K/UL — SIGNIFICANT CHANGE UP (ref 0–0.5)
EOSINOPHIL NFR BLD AUTO: 1.7 % — SIGNIFICANT CHANGE UP (ref 0–6)
FOLATE SERPL-MCNC: 20 NG/ML — HIGH (ref 3.1–17.5)
GLUCOSE BLDC GLUCOMTR-MCNC: 153 MG/DL — HIGH (ref 70–99)
GLUCOSE BLDC GLUCOMTR-MCNC: 77 MG/DL — SIGNIFICANT CHANGE UP (ref 70–99)
GLUCOSE BLDC GLUCOMTR-MCNC: 81 MG/DL — SIGNIFICANT CHANGE UP (ref 70–99)
GLUCOSE BLDC GLUCOMTR-MCNC: 81 MG/DL — SIGNIFICANT CHANGE UP (ref 70–99)
GLUCOSE BLDC GLUCOMTR-MCNC: 82 MG/DL — SIGNIFICANT CHANGE UP (ref 70–99)
GLUCOSE BLDC GLUCOMTR-MCNC: 89 MG/DL — SIGNIFICANT CHANGE UP (ref 70–99)
GLUCOSE SERPL-MCNC: 75 MG/DL — SIGNIFICANT CHANGE UP (ref 70–99)
GLUCOSE SERPL-MCNC: 982 MG/DL — CRITICAL HIGH (ref 70–99)
HCT VFR BLD CALC: 15.1 % — CRITICAL LOW (ref 39–50)
HCT VFR BLD CALC: 28.8 % — LOW (ref 39–50)
HGB BLD-MCNC: 4.2 G/DL — CRITICAL LOW (ref 13–17)
HGB BLD-MCNC: 8.6 G/DL — LOW (ref 13–17)
IANC: 1.75 K/UL — LOW (ref 1.8–7.4)
IMM GRANULOCYTES NFR BLD AUTO: 1.7 % — HIGH (ref 0–0.9)
LYMPHOCYTES # BLD AUTO: 0.33 K/UL — LOW (ref 1–3.3)
LYMPHOCYTES # BLD AUTO: 14 % — SIGNIFICANT CHANGE UP (ref 13–44)
MAGNESIUM SERPL-MCNC: 2.5 MG/DL — SIGNIFICANT CHANGE UP (ref 1.6–2.6)
MAGNESIUM SERPL-MCNC: 3.4 MG/DL — HIGH (ref 1.6–2.6)
MCHC RBC-ENTMCNC: 26.2 PG — LOW (ref 27–34)
MCHC RBC-ENTMCNC: 27.1 PG — SIGNIFICANT CHANGE UP (ref 27–34)
MCHC RBC-ENTMCNC: 27.8 GM/DL — LOW (ref 32–36)
MCHC RBC-ENTMCNC: 29.9 GM/DL — LOW (ref 32–36)
MCV RBC AUTO: 87.8 FL — SIGNIFICANT CHANGE UP (ref 80–100)
MCV RBC AUTO: 97.4 FL — SIGNIFICANT CHANGE UP (ref 80–100)
MONOCYTES # BLD AUTO: 0.19 K/UL — SIGNIFICANT CHANGE UP (ref 0–0.9)
MONOCYTES NFR BLD AUTO: 8.1 % — SIGNIFICANT CHANGE UP (ref 2–14)
MRSA PCR RESULT.: DETECTED
NEUTROPHILS # BLD AUTO: 1.75 K/UL — LOW (ref 1.8–7.4)
NEUTROPHILS NFR BLD AUTO: 74.1 % — SIGNIFICANT CHANGE UP (ref 43–77)
NRBC # BLD: 0 /100 WBCS — SIGNIFICANT CHANGE UP (ref 0–0)
NRBC # BLD: 0 /100 WBCS — SIGNIFICANT CHANGE UP (ref 0–0)
NRBC # FLD: 0 K/UL — SIGNIFICANT CHANGE UP (ref 0–0)
NRBC # FLD: 0.02 K/UL — HIGH (ref 0–0)
PHOSPHATE SERPL-MCNC: 0.5 MG/DL — CRITICAL LOW (ref 2.5–4.5)
PHOSPHATE SERPL-MCNC: 0.9 MG/DL — CRITICAL LOW (ref 2.5–4.5)
PLATELET # BLD AUTO: 107 K/UL — LOW (ref 150–400)
PLATELET # BLD AUTO: 182 K/UL — SIGNIFICANT CHANGE UP (ref 150–400)
POTASSIUM SERPL-MCNC: 3.8 MMOL/L — SIGNIFICANT CHANGE UP (ref 3.5–5.3)
POTASSIUM SERPL-MCNC: 4.1 MMOL/L — SIGNIFICANT CHANGE UP (ref 3.5–5.3)
POTASSIUM SERPL-SCNC: 3.8 MMOL/L — SIGNIFICANT CHANGE UP (ref 3.5–5.3)
POTASSIUM SERPL-SCNC: 4.1 MMOL/L — SIGNIFICANT CHANGE UP (ref 3.5–5.3)
PROT SERPL-MCNC: 6 G/DL — SIGNIFICANT CHANGE UP (ref 6–8.3)
RBC # BLD: 1.55 M/UL — LOW (ref 4.2–5.8)
RBC # BLD: 3.28 M/UL — LOW (ref 4.2–5.8)
RBC # FLD: 20.8 % — HIGH (ref 10.3–14.5)
RBC # FLD: 22.7 % — HIGH (ref 10.3–14.5)
S AUREUS DNA NOSE QL NAA+PROBE: DETECTED
SODIUM SERPL-SCNC: 123 MMOL/L — LOW (ref 135–145)
SODIUM SERPL-SCNC: 139 MMOL/L — SIGNIFICANT CHANGE UP (ref 135–145)
TROPONIN T, HIGH SENSITIVITY RESULT: 365 NG/L — CRITICAL HIGH
VANCOMYCIN FLD-MCNC: 6 UG/ML — SIGNIFICANT CHANGE UP
VIT B12 SERPL-MCNC: 692 PG/ML — SIGNIFICANT CHANGE UP (ref 200–900)
WBC # BLD: 2.36 K/UL — LOW (ref 3.8–10.5)
WBC # BLD: 4.02 K/UL — SIGNIFICANT CHANGE UP (ref 3.8–10.5)
WBC # FLD AUTO: 2.36 K/UL — LOW (ref 3.8–10.5)
WBC # FLD AUTO: 4.02 K/UL — SIGNIFICANT CHANGE UP (ref 3.8–10.5)

## 2024-07-30 PROCEDURE — 99223 1ST HOSP IP/OBS HIGH 75: CPT | Mod: GC

## 2024-07-30 PROCEDURE — 71045 X-RAY EXAM CHEST 1 VIEW: CPT | Mod: 26

## 2024-07-30 PROCEDURE — 99223 1ST HOSP IP/OBS HIGH 75: CPT

## 2024-07-30 PROCEDURE — 71250 CT THORAX DX C-: CPT | Mod: 26

## 2024-07-30 PROCEDURE — 99497 ADVNCD CARE PLAN 30 MIN: CPT | Mod: 25

## 2024-07-30 RX ORDER — PROBIOTIC PRODUCT - TAB 1B-250 MG
1 TAB ORAL
Refills: 0 | DISCHARGE

## 2024-07-30 RX ORDER — PREDNISOLONE SODIUM PHOSPHATE 1 %
1 DROPS OPHTHALMIC (EYE)
Qty: 0 | Refills: 0 | DISCHARGE

## 2024-07-30 RX ORDER — DEXTROSE MONOHYDRATE, SODIUM CHLORIDE, SODIUM LACTATE, CALCIUM CHLORIDE, MAGNESIUM CHLORIDE 1.5; 538; 448; 18.4; 5.08 G/100ML; MG/100ML; MG/100ML; MG/100ML; MG/100ML
1000 SOLUTION INTRAPERITONEAL
Refills: 0 | Status: DISCONTINUED | OUTPATIENT
Start: 2024-07-30 | End: 2024-07-30

## 2024-07-30 RX ORDER — ONDANSETRON HYDROCHLORIDE 2 MG/ML
1 INJECTION INTRAMUSCULAR; INTRAVENOUS
Refills: 0 | DISCHARGE

## 2024-07-30 RX ORDER — POTASSIUM PHOSPHATE, MONOBASIC AND POTASSIUM PHOSPHATE, DIBASIC 224; 236 MG/ML; MG/ML
15 INJECTION, SOLUTION INTRAVENOUS ONCE
Refills: 0 | Status: DISCONTINUED | OUTPATIENT
Start: 2024-07-30 | End: 2024-07-30

## 2024-07-30 RX ORDER — PANTOPRAZOLE SODIUM 40 MG/10ML
1 INJECTION, POWDER, FOR SOLUTION INTRAVENOUS
Refills: 0 | DISCHARGE

## 2024-07-30 RX ORDER — ACETAMINOPHEN 325 MG
1 TABLET ORAL
Refills: 0 | DISCHARGE

## 2024-07-30 RX ORDER — ATORVASTATIN CALCIUM 40 MG/1
1 TABLET, FILM COATED ORAL
Refills: 0 | DISCHARGE

## 2024-07-30 RX ORDER — CYANOCOBALAMIN/FOLIC AC/VIT B6 2-2.5-25MG
1 TABLET ORAL
Refills: 0 | DISCHARGE

## 2024-07-30 RX ORDER — SENNOSIDES 8.6 MG/1
1 TABLET ORAL
Refills: 0 | DISCHARGE

## 2024-07-30 RX ORDER — ACETAMINOPHEN 500 MG
1000 TABLET ORAL ONCE
Refills: 0 | Status: COMPLETED | OUTPATIENT
Start: 2024-07-30 | End: 2024-07-30

## 2024-07-30 RX ORDER — CARVEDILOL PHOSPHATE 80 MG
1 CAPSULE,EXTENDED RELEASE MULTIPHASE 24HR ORAL
Refills: 0 | DISCHARGE

## 2024-07-30 RX ORDER — OXYCODONE AND ACETAMINOPHEN 10; 325 MG/1; MG/1
1 TABLET ORAL
Refills: 0 | DISCHARGE

## 2024-07-30 RX ORDER — ASPIRIN 325 MG/1
1 TABLET, FILM COATED ORAL
Refills: 0 | DISCHARGE

## 2024-07-30 RX ORDER — ASPIRIN 500 MG
1 TABLET ORAL
Refills: 0 | DISCHARGE

## 2024-07-30 RX ORDER — FUROSEMIDE 10 MG/ML
1 INJECTION, SOLUTION INTRAVENOUS
Refills: 0 | DISCHARGE

## 2024-07-30 RX ORDER — ESOMEPRAZOLE MAGNESIUM 40 MG
1 CAPSULE,DELAYED RELEASE (ENTERIC COATED) ORAL
Refills: 0 | DISCHARGE

## 2024-07-30 RX ORDER — DEXTROSE 4 G
25 TABLET,CHEWABLE ORAL ONCE
Refills: 0 | Status: COMPLETED | OUTPATIENT
Start: 2024-07-30 | End: 2024-07-30

## 2024-07-30 RX ORDER — MIDODRINE HYDROCHLORIDE 10 MG/1
7.5 TABLET ORAL
Qty: 0 | Refills: 0 | DISCHARGE

## 2024-07-30 RX ORDER — MUPIROCIN CALCIUM 20 MG/G
1 CREAM TOPICAL
Refills: 0 | Status: COMPLETED | OUTPATIENT
Start: 2024-07-30 | End: 2024-08-04

## 2024-07-30 RX ORDER — LACTULOSE 10 G/15ML
10 SOLUTION ORAL
Refills: 0 | DISCHARGE

## 2024-07-30 RX ORDER — EPOETIN ALFA 3000 [IU]/ML
10000 SOLUTION INTRAVENOUS; SUBCUTANEOUS
Refills: 0 | Status: DISCONTINUED | OUTPATIENT
Start: 2024-07-30 | End: 2024-08-06

## 2024-07-30 RX ORDER — POTASSIUM PHOSPHATE, MONOBASIC AND POTASSIUM PHOSPHATE, DIBASIC 224; 236 MG/ML; MG/ML
30 INJECTION, SOLUTION INTRAVENOUS ONCE
Refills: 0 | Status: DISCONTINUED | OUTPATIENT
Start: 2024-07-30 | End: 2024-07-30

## 2024-07-30 RX ORDER — MIRTAZAPINE 15 MG
1 TABLET ORAL
Refills: 0 | DISCHARGE

## 2024-07-30 RX ORDER — ATORVASTATIN CALCIUM 20 MG/1
1 TABLET, FILM COATED ORAL
Refills: 0 | DISCHARGE

## 2024-07-30 RX ORDER — CARVEDILOL PHOSPHATE 80 MG/1
1 CAPSULE, EXTENDED RELEASE ORAL
Refills: 0 | DISCHARGE

## 2024-07-30 RX ORDER — POTASSIUM PHOSPHATE, MONOBASIC AND POTASSIUM PHOSPHATE, DIBASIC 224; 236 MG/ML; MG/ML
30 INJECTION, SOLUTION INTRAVENOUS ONCE
Refills: 0 | Status: COMPLETED | OUTPATIENT
Start: 2024-07-30 | End: 2024-07-30

## 2024-07-30 RX ORDER — SENNOSIDES 8.6 MG
2 TABLET ORAL
Refills: 0 | DISCHARGE

## 2024-07-30 RX ORDER — LATANOPROST PF 0.05 MG/ML
1 SOLUTION/ DROPS OPHTHALMIC
Refills: 0 | DISCHARGE

## 2024-07-30 RX ORDER — MIRTAZAPINE 15 MG/1
1 TABLET, FILM COATED ORAL
Refills: 0 | DISCHARGE

## 2024-07-30 RX ORDER — POLYETHYLENE GLYCOL 3350 1 G/G
1 POWDER ORAL
Qty: 0 | Refills: 0 | DISCHARGE

## 2024-07-30 RX ADMIN — Medication 300 MILLIGRAM(S): at 13:58

## 2024-07-30 RX ADMIN — CHLORHEXIDINE GLUCONATE 1 APPLICATION(S): 500 CLOTH TOPICAL at 13:59

## 2024-07-30 RX ADMIN — Medication 25 MILLILITER(S): at 11:51

## 2024-07-30 RX ADMIN — POTASSIUM PHOSPHATE, MONOBASIC AND POTASSIUM PHOSPHATE, DIBASIC 83.33 MILLIMOLE(S): 224; 236 INJECTION, SOLUTION INTRAVENOUS at 17:29

## 2024-07-30 RX ADMIN — PANTOPRAZOLE SODIUM 40 MILLIGRAM(S): 20 TABLET, DELAYED RELEASE ORAL at 11:34

## 2024-07-30 RX ADMIN — PIPERACILLIN SODIUM, TAZOBACTAM SODIUM 25 GRAM(S): 3; .375 INJECTION, POWDER, LYOPHILIZED, FOR SOLUTION INTRAVENOUS at 17:51

## 2024-07-30 RX ADMIN — Medication 400 MILLIGRAM(S): at 11:33

## 2024-07-30 RX ADMIN — HEPARIN SODIUM 5000 UNIT(S): 1000 INJECTION, SOLUTION INTRAVENOUS; SUBCUTANEOUS at 17:23

## 2024-07-30 RX ADMIN — Medication 1000 MILLIGRAM(S): at 12:03

## 2024-07-30 RX ADMIN — Medication 400 MILLIGRAM(S): at 20:03

## 2024-07-30 RX ADMIN — HEPARIN SODIUM 5000 UNIT(S): 1000 INJECTION, SOLUTION INTRAVENOUS; SUBCUTANEOUS at 06:00

## 2024-07-30 RX ADMIN — PIPERACILLIN SODIUM, TAZOBACTAM SODIUM 25 GRAM(S): 3; .375 INJECTION, POWDER, LYOPHILIZED, FOR SOLUTION INTRAVENOUS at 05:59

## 2024-07-30 RX ADMIN — Medication 1 DROP(S): at 21:42

## 2024-07-30 NOTE — CONSULT NOTE ADULT - ATTENDING COMMENTS
69-year-old male with past medical history of ESRD on hemodialysis MWedFr left AV fistula, HTN, HLD, CAD, CVA, dementia a/o times 1-2 baseline who arrives due to minimal responsiveness during hemodialysis. He was not able to fully express his words. He is able to speak words at a time. He had CT head done that showed multiple prior strokes before. He received vancomycin and zosyn, has been more responsive since. Noted to have pleural effusions. Patient's ROS otherwise negative.    Patient is a 70 yo M w/ ESRD on HD, functional quadriplegia (R sided BKA + L sided AKA,), blindness, CAD, HTN, HLD, Chronic hypoxemic respiratory failure (2L home O2) who presents today with AMS and minimal responsiveness during HD session. As per chart review, patient was in usual state of health this AM prior to HD session but was found to be unresponsive to voice before HD. Patient then awoke in ED with unintelligible speech. Code stroke was called in ED, CTH with no acute findings. Admitted to medicine, being treated empirically for infection, unclear source. Pulmonary called for pleural effusions.     Labs notable for no leukocytosis WBC 4, Hb 8.6, Bicarb 30, Trop 365    CTH showed no acute intracranial hemorrhage or acute retrievable clot, stable chronic infarcts, senescent cerebral changes, and hyperdense   mass at the left CPA, proximal right ICA stenosis, 40-50%, multifocal stenotic lesions in the basilar artery and both V4 vertebral segments, heavily calcified chronic plaque with moderate to severe bilateral DENISE stenoses.    CT chest showed increase in moderate right loculated pleural effusion, stable small left pleural effusion, similar appearance of rounded atelectasis in the left lower lobe     #Acute on chronic hypoxemic respiratory failure - Chronic, unclear associated diagnosis, currently on 3L nc, saturating well. CT chest with bilateral pleural effusions, recurrent in nature, now R>L. Most likely in setting of chronic volume overload, of note, the laterality of the larger side changes over time. Noted thoracentesis of L also for large pleural effusion in 6/2023, transudative with negative cyto x1.   #Pleural effusions - Recurrent, likely 2/2 chronic volume overload. SImple on POCUS  #Encephalopathy  #ESRD  #HFrEF  - No current indication for thoracentesis at this time  - Recommend HD for net negative  - c/w supplemental O2, wean as tolerated  - Agree with empiric abx as per primary team for possible infection  - f/u infectious workup  - Encephalopathy work up as per neuro/primary team    Diego Sam MD  Pulmonary & Critical Care 69-year-old male with past medical history of ESRD on hemodialysis MWedFr left AV fistula, HTN, HLD, CAD, CVA, on baseline home O2 2L, dementia a/o times 1-2 baseline who arrives due to minimal responsiveness during hemodialysis, followed by expressive aphasia, CT head w/ multiple old infarcts, more consistent w/ toxic metabolic encephalopathy. Pulmonary consulted for b/l pleural effusions.     CT chest showed increase in moderate right loculated pleural effusion, stable small left pleural effusion; reviewed prior CXR and CT chest - patient w/ chronic effusions fluctuating in size, most likely c/w changes in volume status w/ HD.   Prior thoracentesis 6/2023 c/w transudate   POCUS w/ moderate simple R pleff and small L pleff; no septations, loculations    Recommendations:   - pleural effusions most likely in s/o volume overload  - POCUS as above  - HD for net negative, will continue to monitor respiratory status   - wean O2 as tolerated  - abx per primary team   - infectious work-up; pls send sputum sample    D/w Dr. Sam and primary team Patient is a 68 yo M w/ ESRD on HD, functional quadriplegia (R sided BKA + L sided AKA,), blindness, CAD, HTN, HLD, Chronic hypoxemic respiratory failure (2L home O2) who presents today with AMS and minimal responsiveness during HD session. As per chart review, patient was in usual state of health this AM prior to HD session but was found to be unresponsive to voice before HD. Patient then awoke in ED with unintelligible speech. Code stroke was called in ED, CTH with no acute findings. Admitted to medicine, being treated empirically for infection, unclear source. Pulmonary called for pleural effusions.     Labs notable for no leukocytosis WBC 4, Hb 8.6, Bicarb 30, Trop 365    CTH showed no acute intracranial hemorrhage or acute retrievable clot, stable chronic infarcts, senescent cerebral changes, and hyperdense   mass at the left CPA, proximal right ICA stenosis, 40-50%, multifocal stenotic lesions in the basilar artery and both V4 vertebral segments, heavily calcified chronic plaque with moderate to severe bilateral DENISE stenoses.    CT chest showed increase in moderate right loculated pleural effusion, stable small left pleural effusion, similar appearance of rounded atelectasis in the left lower lobe     #Acute on chronic hypoxemic respiratory failure - Chronic, unclear associated diagnosis, currently on 3L nc, saturating well. CT chest with bilateral pleural effusions, recurrent in nature, now R>L. Most likely in setting of chronic volume overload, of note, the laterality of the larger side changes over time. Noted thoracentesis of L also for large pleural effusion in 6/2023, transudative with negative cyto x1.   #Pleural effusions - Recurrent, likely 2/2 chronic volume overload. SImple on POCUS  #Encephalopathy  #ESRD  #HFrEF  - No current indication for thoracentesis at this time  - Recommend HD for net negative  - c/w supplemental O2, wean as tolerated  - Agree with empiric abx as per primary team for possible infection  - f/u infectious workup  - Encephalopathy work up as per neuro/primary team    Diego Sam MD  Pulmonary & Critical Care

## 2024-07-30 NOTE — CONSULT NOTE ADULT - NS ATTEST RISK PROBLEM GEN_ALL_CORE FT
#Acute on chronic hypoxemic respiratory failure  #Pleural effusions   #Encephalopathy  #ESRD  #HFrEF

## 2024-07-30 NOTE — CONSULT NOTE ADULT - ASSESSMENT
69-year-old male with past medical history of ESRD on hemodialysis MWedFr left AV fistula, HTN, HLD, CAD, CVA, on baseline home O2 2L, dementia a/o times 1-2 baseline who arrives due to minimal responsiveness during hemodialysis, followed by expressive aphasia, CT head w/ multiple old infarcts, more consistent w/ toxic metabolic encephalopathy. Pulmonary consulted for b/l pleural effusions.     CT chest showed increase in moderate right loculated pleural effusion, stable small left pleural effusion; reviewed prior CXR and CT chest - patient w/ chronic effusions fluctuating in size, most likely c/w changes in volume status w/ HD.   Prior thoracentesis 6/2023 c/w transudate   POCUS w/ moderate simple R pleff and small L pleff; no septations, loculations    Recommendations:   - pleural effusions most likely in s/o volume overload  - POCUS as above  - HD for net negative, will continue to monitor respiratory status   - wean O2 as tolerated  - abx per primary team   - infectious work-up; pls send sputum sample    D/w Dr. Sam and primary team

## 2024-07-30 NOTE — H&P ADULT - ASSESSMENT
69-year-old male with past medical history of ESRD on hemodialysis MWedFr left AV fistula, HTN, HLD, CAD, CVA, dementia a/o times 1-2 baseline who arrives due to minimal responsiveness during hemodialysis.

## 2024-07-30 NOTE — CHART NOTE - NSCHARTNOTEFT_GEN_A_CORE
Notified by RN of critical lab   Calcium 6.5   Phos 0.5  Glucose 982    Of note patient has been receiving Zoysn, RN to recheck fingerstick glucose and redraw CBC/BMP due to likely contamination from dextrose that zosyn runs in. Continue to monitor mental status.     Update: repeat FS 77, will order dextrose as patient blood glucose has been downtrending, RN to get repeat labs for clarification of electrolytes first.     Mary Jones PA-C  Department of Internal Medicine  pager [phone], available on [employer] TEAMS Notified by RN of critical lab   Calcium 6.5   Phos 0.5  Glucose 982    Of note patient has been receiving Zoysn, RN to recheck fingerstick glucose and redraw CBC/BMP due to likely contamination from dextrose that zosyn runs in. Continue to monitor mental status.     Update: repeat FS 77, will order dextrose IVF as patient blood glucose has been downtrending and poor PO intake due to mental status, RN to get repeat labs for clarification of electrolytes first.     Mary Jones PA-C  Department of Internal Medicine  pager [phone], available on [employer] TEAMS Notified by RN of critical lab   Calcium 6.5   Phos 0.5  Glucose 982    Of note patient has been receiving Zoysn, RN to recheck fingerstick glucose and redraw CBC/BMP due to likely contamination from dextrose that zosyn runs in. Continue to monitor mental status.     Update: repeat FS 77, will consider dextrose IVF as patient blood glucose has been downtrending and poor PO intake due to mental status, RN to get repeat labs for clarification of electrolytes first. Pending results of CT chest for eval of pleural effusion     Mary Jones PA-C  Department of Internal Medicine  pager [phone], available on [employer] TEAMS

## 2024-07-30 NOTE — PROGRESS NOTE ADULT - PROBLEM SELECTOR PLAN 1
-TME unclear etiology--previously with similar admission.  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  -f/u urinalysis reflex to culture  -obtain formal CT chest, pleural effusion noted on CT head, might have had reaccumulation of large pleural effusion (had prior thoracentesis 8/2023 with transudative effusion)  -hx of prior MRSA bacteremia, f/u blood cultures, UA and Urine cx--unclear infectious source at this time  -vancomycin by level, zosyn 3.375 gram BID  -NPO pending speech/swallow studies  -if clinically not improving will need MR head noncontrast (has loop recorder needs clearance prior)  -Will obtain collateral

## 2024-07-30 NOTE — CONSULT NOTE ADULT - SUBJECTIVE AND OBJECTIVE BOX
PULMONARY CONSULTATION NOTE    NAME: JAEMS PATRICK  MEDICAL RECORD NUMBER: [id number]    CHIEF COMPLAINT: Patient is a 69y old  Male who presents with a chief complaint of expressive aphasia (30 Jul 2024 14:12)      HISTORY OF PRESENT ILLNESS:   HPI:  69-year-old male with past medical history of ESRD on hemodialysis MWedFr left AV fistula, HTN, HLD, CAD, CVA, dementia a/o times 1-2 baseline who arrives due to minimal responsiveness during hemodialysis. He was not able to fully express his words. He is able to speak words at a time. He had CT head done that showed multiple prior strokes before. He received vancomycin and zosyn, has been more responsive since. Noted to have pleural effusions. Patient's ROS otherwise negative.    Nephrology and neurology evaluated patient in the ED.  Troponin elevated, ckmb and ck wnl, EKG nonischemic.      (30 Jul 2024 01:27)      ====================BACKGROUND INFORMATION====================    FAMILY HISTORY: FAMILY HISTORY:  Family history of diabetes mellitus        PAST MEDICAL AND SURGICAL HISTORY: PAST MEDICAL & SURGICAL HISTORY:  DM (diabetes mellitus)      HTN (hypertension)      Below knee amputation status, right      CKD (chronic kidney disease)      MRSA (methicillin resistant Staphylococcus aureus) colonization  (hx/o MRSA growth from wound culture)      Wound  (chronic wounds to left foot and leg)      DM (diabetes mellitus)      HTN (hypertension)      Kidney disease      S/P BKA (below knee amputation) unilateral, right      H/O peripheral artery bypass  left fem to below-knee pop with RSVG      Amputated left leg  above knee      Amputated right leg  below knee          ALLERGIES:Allergies    No Known Allergies    Intolerances        HOME MEDICATIONS:     OUTPATIENT PULMONARY DOCTOR:     PFTs:     SOCIAL HISTORY:  TOBACCO USE:  ALCOHOL:  DRUGS:  MARITAL STATUS:  EMPLOYMENT:  EXPOSURES:  RECENT TRAVELS:  PETS:  CODE STATUS:    ========================REVIEW OF SYSTEMS========================  [x] ROS negative except as per HPI    ========================MEDICATIONS=============================  NEURO  aspirin Suppository 300 milliGRAM(s) Rectal daily    CARDIO    PULM    FEN/GI  dextrose 5%. 1000 milliLiter(s) IV Continuous <Continuous>  dextrose 5%. 1000 milliLiter(s) IV Continuous <Continuous>  pantoprazole  Injectable 40 milliGRAM(s) IV Push daily  potassium phosphate IVPB 30 milliMole(s) IV Intermittent once    HEME/ONC  heparin   Injectable 5000 Unit(s) SubCutaneous every 12 hours    ANTIMICROBIALS  piperacillin/tazobactam IVPB.. 3.375 Gram(s) IV Intermittent every 12 hours    ENDO  dextrose 50% Injectable 12.5 Gram(s) IV Push once  dextrose 50% Injectable 25 Gram(s) IV Push once  dextrose 50% Injectable 25 Gram(s) IV Push once  glucagon  Injectable 1 milliGRAM(s) IntraMuscular once  insulin lispro (ADMELOG) corrective regimen sliding scale   SubCutaneous every 6 hours    OTHER  chlorhexidine 2% Cloths 1 Application(s) Topical daily  epoetin dinah (EPOGEN) Injectable 07558 Unit(s) IV Push <User Schedule>  latanoprost 0.005% Ophthalmic Solution 1 Drop(s) Both EYES at bedtime      PRN      Drips      ========================PHYSICAL EXAM============================    VITALS: Vital Signs Last 24 Hrs  T(C): 36.7 (30 Jul 2024 09:41), Max: 36.9 (29 Jul 2024 16:04)  T(F): 98 (30 Jul 2024 09:41), Max: 98.5 (29 Jul 2024 16:04)  HR: 63 (30 Jul 2024 09:41) (63 - 84)  BP: 109/98 (30 Jul 2024 09:41) (109/98 - 154/77)  BP(mean): --  RR: 19 (30 Jul 2024 09:41) (17 - 19)  SpO2: 95% (30 Jul 2024 09:41) (95% - 100%)    Parameters below as of 30 Jul 2024 09:41    O2 Flow (L/min): 3      INTAKE and OUTPUT: I&O's Detail    29 Jul 2024 07:01  -  30 Jul 2024 07:00  --------------------------------------------------------  IN:    Other (mL): 500 mL  Total IN: 500 mL    OUT:    Other (mL): 2500 mL  Total OUT: 2500 mL    Total NET: -2000 mL          WEIGHT    VENTILATOR SETTINGS:     Physical Exam  CONST:       ENMT:         RESP :         CHEST:        CARDS:      VASC:           ABD:            MSK:            NEURO:       SKIN:           ======================LABORATORY RESULTS AND IMAGING=============                        8.6    4.02  )-----------( 182      ( 30 Jul 2024 09:03 )             28.8                                  07-30    139  |  98  |  43<H>  ----------------------------<  75  4.1   |  30  |  3.21<H>    Ca    8.8      30 Jul 2024 09:03  Phos  0.9     07-30  Mg     3.40     07-30    TPro  6.0  /  Alb  2.1<L>  /  TBili  <0.2  /  DBili  x   /  AST  24  /  ALT  23  /  AlkPhos  72  07-30    N:1.75/L:0.33= __ 07-30-24 @ 06:37  N:4.36/L:0.36= __ 07-29-24 @ 14:30  N:2.32/L:0.84= __ 07-09-24 @ 06:48  N:3.46/L:0.47= __ 07-04-24 @ 01:18  N:4.65/L:0.76= __ 07-03-24 @ 18:49    Ref-range: <3 normal, >9 elevated, >18 very elevated  Procalcitonin: 0.41 ng/mL (07-29-24 @ 18:52)    ABG Trend  06-23-23 @ 08:37 [id number]  - 7.43/49/62/32/91.5 Lactate --  03-29-18 @ 13:17 FiO2--  - 7.42/40/210/26/99.3 Lactate --  03-29-18 @ 11:16 FiO2--  - 7.43/40/167/26/99.1 Lactate --  03-29-18 @ 09:51 FiO2--  - 7.49/33/370/27/99.8 Lactate --    VBG Trend  07-29-24 @ 14:30 FiO2--  - 7.41/57/47/36/80.9 Lactate 1.8  07-03-24 @ 18:30 FiO2--  - 7.45/55/56/38/90.1 Lactate 1.8  05-02-24 @ 06:17 FiO2--  - 7.42/50/52/32/84.5 Lactate 1.6  04-24-24 @ 22:31 FiO2--  - 7.40/62/29/38/43.8 Lactate 2.2  08-25-23 @ 19:58 FiO2--  - 7.48/49/76/36/96.2 Lactate 1.6      Creatinine Trend: 3.21<--, 2.42<--, 4.93<--, 4.00<--, 5.05<--, 3.81<--    [x] RADIOLOGY REVIEWED AND INTERPRETED BY ME     PULMONARY CONSULTATION NOTE    NAME: JAMES PATRICK  MEDICAL RECORD NUMBER: [id number]    CHIEF COMPLAINT: Patient is a 69y old  Male who presents with a chief complaint of expressive aphasia (30 Jul 2024 14:12)      HISTORY OF PRESENT ILLNESS:   HPI:  69-year-old male with past medical history of ESRD on hemodialysis MWedFr left AV fistula, HTN, HLD, CAD, CVA, dementia a/o times 1-2 baseline who arrives due to minimal responsiveness during hemodialysis. He was not able to fully express his words. He is able to speak words at a time. He had CT head done that showed multiple prior strokes before. He received vancomycin and zosyn, has been more responsive since. Noted to have pleural effusions. Patient's ROS otherwise negative.    Nephrology and neurology evaluated patient in the ED.  Troponin elevated, ckmb and ck wnl, EKG nonischemic.      (30 Jul 2024 01:27)      ====================BACKGROUND INFORMATION====================    FAMILY HISTORY: FAMILY HISTORY:  Family history of diabetes mellitus        PAST MEDICAL AND SURGICAL HISTORY: PAST MEDICAL & SURGICAL HISTORY:  DM (diabetes mellitus)      HTN (hypertension)      Below knee amputation status, right      CKD (chronic kidney disease)      MRSA (methicillin resistant Staphylococcus aureus) colonization  (hx/o MRSA growth from wound culture)      Wound  (chronic wounds to left foot and leg)      DM (diabetes mellitus)      HTN (hypertension)      Kidney disease      S/P BKA (below knee amputation) unilateral, right      H/O peripheral artery bypass  left fem to below-knee pop with RSVG      Amputated left leg  above knee      Amputated right leg  below knee          ALLERGIES:Allergies    No Known Allergies    Intolerances        HOME MEDICATIONS:     OUTPATIENT PULMONARY DOCTOR:     PFTs:     SOCIAL HISTORY:  TOBACCO USE:  ALCOHOL:  DRUGS:  MARITAL STATUS:  EMPLOYMENT:  EXPOSURES:  RECENT TRAVELS:  PETS:  CODE STATUS:    ========================REVIEW OF SYSTEMS========================  [x] ROS negative except as per HPI    ========================MEDICATIONS=============================  NEURO  aspirin Suppository 300 milliGRAM(s) Rectal daily    CARDIO    PULM    FEN/GI  dextrose 5%. 1000 milliLiter(s) IV Continuous <Continuous>  dextrose 5%. 1000 milliLiter(s) IV Continuous <Continuous>  pantoprazole  Injectable 40 milliGRAM(s) IV Push daily  potassium phosphate IVPB 30 milliMole(s) IV Intermittent once    HEME/ONC  heparin   Injectable 5000 Unit(s) SubCutaneous every 12 hours    ANTIMICROBIALS  piperacillin/tazobactam IVPB.. 3.375 Gram(s) IV Intermittent every 12 hours    ENDO  dextrose 50% Injectable 12.5 Gram(s) IV Push once  dextrose 50% Injectable 25 Gram(s) IV Push once  dextrose 50% Injectable 25 Gram(s) IV Push once  glucagon  Injectable 1 milliGRAM(s) IntraMuscular once  insulin lispro (ADMELOG) corrective regimen sliding scale   SubCutaneous every 6 hours    OTHER  chlorhexidine 2% Cloths 1 Application(s) Topical daily  epoetin dinah (EPOGEN) Injectable 99406 Unit(s) IV Push <User Schedule>  latanoprost 0.005% Ophthalmic Solution 1 Drop(s) Both EYES at bedtime      PRN      Drips      ========================PHYSICAL EXAM============================    VITALS: Vital Signs Last 24 Hrs  T(C): 36.7 (30 Jul 2024 09:41), Max: 36.9 (29 Jul 2024 16:04)  T(F): 98 (30 Jul 2024 09:41), Max: 98.5 (29 Jul 2024 16:04)  HR: 63 (30 Jul 2024 09:41) (63 - 84)  BP: 109/98 (30 Jul 2024 09:41) (109/98 - 154/77)  BP(mean): --  RR: 19 (30 Jul 2024 09:41) (17 - 19)  SpO2: 95% (30 Jul 2024 09:41) (95% - 100%)    Parameters below as of 30 Jul 2024 09:41    O2 Flow (L/min): 3      INTAKE and OUTPUT: I&O's Detail    29 Jul 2024 07:01  -  30 Jul 2024 07:00  --------------------------------------------------------  IN:    Other (mL): 500 mL  Total IN: 500 mL    OUT:    Other (mL): 2500 mL  Total OUT: 2500 mL    Total NET: -2000 mL          WEIGHT    VENTILATOR SETTINGS:     Physical Exam  GENERAL: NAD, well-developed  NECK: Supple, No JVD  CHEST/LUNG: Clear to auscultation bilaterally; No wheeze  HEART: Regular rate and rhythm; No murmurs, rubs, or gallops  EXTREMITIES:  BKA/AKA, 2+ radial pulses, No clubbing, cyanosis, or edema  NEUROLOGY: arousable to voice and tactile stimuli, not answering questions  SKIN: No rashes or lesions        ======================LABORATORY RESULTS AND IMAGING=============                        8.6    4.02  )-----------( 182      ( 30 Jul 2024 09:03 )             28.8                                  07-30    139  |  98  |  43<H>  ----------------------------<  75  4.1   |  30  |  3.21<H>    Ca    8.8      30 Jul 2024 09:03  Phos  0.9     07-30  Mg     3.40     07-30    TPro  6.0  /  Alb  2.1<L>  /  TBili  <0.2  /  DBili  x   /  AST  24  /  ALT  23  /  AlkPhos  72  07-30    N:1.75/L:0.33= __ 07-30-24 @ 06:37  N:4.36/L:0.36= __ 07-29-24 @ 14:30  N:2.32/L:0.84= __ 07-09-24 @ 06:48  N:3.46/L:0.47= __ 07-04-24 @ 01:18  N:4.65/L:0.76= __ 07-03-24 @ 18:49    Ref-range: <3 normal, >9 elevated, >18 very elevated  Procalcitonin: 0.41 ng/mL (07-29-24 @ 18:52)    ABG Trend  06-23-23 @ 08:37 [id number]  - 7.43/49/62/32/91.5 Lactate --  03-29-18 @ 13:17 FiO2--  - 7.42/40/210/26/99.3 Lactate --  03-29-18 @ 11:16 FiO2--  - 7.43/40/167/26/99.1 Lactate --  03-29-18 @ 09:51 FiO2--  - 7.49/33/370/27/99.8 Lactate --    VBG Trend  07-29-24 @ 14:30 FiO2--  - 7.41/57/47/36/80.9 Lactate 1.8  07-03-24 @ 18:30 FiO2--  - 7.45/55/56/38/90.1 Lactate 1.8  05-02-24 @ 06:17 FiO2--  - 7.42/50/52/32/84.5 Lactate 1.6  04-24-24 @ 22:31 FiO2--  - 7.40/62/29/38/43.8 Lactate 2.2  08-25-23 @ 19:58 FiO2--  - 7.48/49/76/36/96.2 Lactate 1.6      Creatinine Trend: 3.21<--, 2.42<--, 4.93<--, 4.00<--, 5.05<--, 3.81<--    [x] RADIOLOGY REVIEWED AND INTERPRETED BY ME

## 2024-07-30 NOTE — PROGRESS NOTE ADULT - ASSESSMENT
69 year old M hx of ESRD on HD left AVF MWFr, CVA w/ gliosis, BKA/AKA functional quadriplegia, CAD, HTN, HLD, sent from dialysis unit for AMS. nephrology consulted for dialysis needs    ESRD:  On HD TIW via [facility].  Schedule is MWF. Consent obtained and charted from HCP Ramonita Vincent [phone]  s/p hd 7/29 uf 2L  hd tmr  - Renal diet.    AMS  s/p stroke code  f/u neuro    anemia  below goal   ct head done without acute finding  epo with hd  monitor    htn  acceptable  monitor

## 2024-07-30 NOTE — PROGRESS NOTE ADULT - SUBJECTIVE AND OBJECTIVE BOX
[facility]   MD MARIBELL CARRION MD ANGELA WONG, PA    TEL:  OFFICE: [phone]  From 5pm-7am Answering Service [phone]    -- RENAL FOLLOW UP NOTE ---Date of Service 07-30-24 @ 09:52    Patient is a 69y old  Male who presents with a chief complaint of expressive aphasia (30 Jul 2024 01:27)      Patient seen and examined at bedside. very lethargic, able to awake with Stereum rub    VITALS:  T(F): 98 (07-30-24 @ 09:41), Max: 98.5 (07-29-24 @ 16:04)  HR: 63 (07-30-24 @ 09:41)  BP: 109/98 (07-30-24 @ 09:41)  RR: 19 (07-30-24 @ 09:41)  SpO2: 95% (07-30-24 @ 09:41)  Wt(kg): --    07-29 @ 07:01  -  07-30 @ 07:00  --------------------------------------------------------  IN: 500 mL / OUT: 2500 mL / NET: -2000 mL      Height (cm): 121.9 (07-29 @ 14:16)    PHYSICAL EXAM:  General: lethargic   Neck: No JVD  Respiratory: CTAB, no wheezes, rales or rhonchi  Cardiovascular: S1, S2, RRR  Gastrointestinal: BS+, soft, NT/ND  Extremities: b/l amputation     Hospital Medications:   MEDICATIONS  (STANDING):  aspirin Suppository 300 milliGRAM(s) Rectal daily  chlorhexidine 2% Cloths 1 Application(s) Topical daily  dextrose 5%. 1000 milliLiter(s) (100 mL/Hr) IV Continuous <Continuous>  dextrose 5%. 1000 milliLiter(s) (50 mL/Hr) IV Continuous <Continuous>  dextrose 50% Injectable 25 Gram(s) IV Push once  dextrose 50% Injectable 25 Gram(s) IV Push once  dextrose 50% Injectable 12.5 Gram(s) IV Push once  glucagon  Injectable 1 milliGRAM(s) IntraMuscular once  heparin   Injectable 5000 Unit(s) SubCutaneous every 12 hours  insulin lispro (ADMELOG) corrective regimen sliding scale   SubCutaneous every 6 hours  latanoprost 0.005% Ophthalmic Solution 1 Drop(s) Both EYES at bedtime  pantoprazole  Injectable 40 milliGRAM(s) IV Push daily  piperacillin/tazobactam IVPB.. 3.375 Gram(s) IV Intermittent every 12 hours      LABS:  07-30    139  |  98  |  43<H>  ----------------------------<  75  4.1   |  30  |  3.21<H>    Ca    8.8      30 Jul 2024 09:03  Phos  0.5     07-30  Mg     3.40     07-30    TPro  6.0  /  Alb  2.1<L>  /  TBili  <0.2  /  DBili      /  AST  24  /  ALT  23  /  AlkPhos  72  07-30    Creatinine Trend: 3.21 <--, 2.42 <--, 4.93 <--    Albumin: 2.1 g/dL (07-30 @ 06:37)  Phosphorus: 0.5 mg/dL (07-30 @ 06:37)  Albumin: 3.2 g/dL (07-29 @ 14:30)                              8.6    4.02  )-----------( 182      ( 30 Jul 2024 09:03 )             28.8     Urine Studies:  Urinalysis - [07-30-24 @ 09:03]      Color  / Appearance  / SG  / pH       Gluc 75 / Ketone   / Bili  / Urobili        Blood  / Protein  / Leuk Est  / Nitrite       RBC  / WBC  / Hyaline  / Gran  / Sq Epi  / Non Sq Epi  / Bacteria       Iron 42, TIBC 127, %sat 33      [05-11-24 @ 07:26]  Ferritin 1680      [05-11-24 @ 07:26]  PTH -- (Ca --)      [04-28-24 @ 05:00]   155  TSH 1.96      [05-07-24 @ 04:54]  Lipid: chol 119, TG 66, HDL 46, LDL --      [10-07-23 @ 09:30]    HBsAb 55.8      [07-03-24 @ 23:30]  HBsAg Nonreact      [07-03-24 @ 23:30]  HBcAb Nonreact      [07-03-24 @ 23:30]  HCV 0.11, Nonreact      [07-03-24 @ 23:30]      RADIOLOGY & ADDITIONAL STUDIES:

## 2024-07-30 NOTE — H&P ADULT - NSHPLABSRESULTS_GEN_ALL_CORE
LABS:                         9.2    5.16  )-----------( 210      ( 29 Jul 2024 14:30 )             29.4     07-29    137  |  93<L>  |  77<H>  ----------------------------<  223<H>  5.2   |  34<H>  |  4.93<H>    Ca    9.2      29 Jul 2024 14:30    TPro  7.3  /  Alb  3.2<L>  /  TBili  0.2  /  DBili  x   /  AST  45<H>  /  ALT  40  /  AlkPhos  143<H>  07-29    PT/INR - ( 29 Jul 2024 14:30 )   PT: 12.2 sec;   INR: 1.08 ratio         PTT - ( 29 Jul 2024 14:30 )  PTT:31.9 sec  Urinalysis Basic - ( 29 Jul 2024 14:30 )    Color: x / Appearance: x / SG: x / pH: x  Gluc: 223 mg/dL / Ketone: x  / Bili: x / Urobili: x   Blood: x / Protein: x / Nitrite: x   Leuk Esterase: x / RBC: x / WBC x   Sq Epi: x / Non Sq Epi: x / Bacteria: x      CARDIAC MARKERS ( 29 Jul 2024 18:52 )  x     / x     / 55 U/L / x     / <1.0 ng/mL      RADIOLOGY, EKG & ADDITIONAL TESTS: Reviewed.

## 2024-07-30 NOTE — H&P ADULT - CONVERSATION DETAILS
Discussed with patient's daughter over the phone GOC and code status of patient. Patient is full code. Daughter is a nurse, had no further questions.            18 minutes spent

## 2024-07-30 NOTE — H&P ADULT - HISTORY OF PRESENT ILLNESS
69-year-old male with past medical history of ESRD on hemodialysis MWedFr left AV fistula, HTN, HLD, CAD, CVA, dementia a/o times 1-2 baseline who arrives due to minimal responsiveness during hemodialysis. He was not able to fully express his words. He is able to speak words at a time. He had CT head done that showed multiple prior strokes before. He received vancomycin and zosyn, has been more responsive since. Noted to have pleural effusions. Patient's ROS otherwise negative.    Nephrology and neurology evaluated patient in the ED.  Troponin elevated, ckmb and ck wnl, EKG nonischemic.

## 2024-07-30 NOTE — PROGRESS NOTE ADULT - PROBLEM SELECTOR PLAN 2
-CT chest with Similar appearance of a rounded consolidation in the   left lower lobe adjacent to the pleura with associated architectural   distortion of the surrounding bronchovascular bundles. Increase in right  moderate loculated pleural effusion with associated atelectasis of the   right lower lobe.  -f/u pulm recs re thora vs any concern for PNA in; past ones have been transudative.

## 2024-07-30 NOTE — PROGRESS NOTE ADULT - PROBLEM SELECTOR PLAN 3
-as above; trops flatlined however likely in the setting of ESRD. Stable EKG. Clinical picture dry/euvolemic.   -Patient oliguric  -daily mag and phos  -HD MWF.

## 2024-07-30 NOTE — PROGRESS NOTE ADULT - SUBJECTIVE AND OBJECTIVE BOX
Dottie Severino PGY3  pager [phone] or check resident tab for coverage    Patient is a 69y old  Male who presents with a chief complaint of expressive aphasia (30 Jul 2024 09:51)      SUBJECTIVE / OVERNIGHT EVENTS: Patient lethargic on interview;  able to open in eyes. Endorses "pain everywhere". Following commands intermittently. Too lethargic for ROS    MEDICATIONS  (STANDING):  aspirin Suppository 300 milliGRAM(s) Rectal daily  chlorhexidine 2% Cloths 1 Application(s) Topical daily  dextrose 5%. 1000 milliLiter(s) (100 mL/Hr) IV Continuous <Continuous>  dextrose 5%. 1000 milliLiter(s) (50 mL/Hr) IV Continuous <Continuous>  dextrose 50% Injectable 25 Gram(s) IV Push once  dextrose 50% Injectable 25 Gram(s) IV Push once  dextrose 50% Injectable 12.5 Gram(s) IV Push once  epoetin dinah (EPOGEN) Injectable 29958 Unit(s) IV Push <User Schedule>  glucagon  Injectable 1 milliGRAM(s) IntraMuscular once  heparin   Injectable 5000 Unit(s) SubCutaneous every 12 hours  insulin lispro (ADMELOG) corrective regimen sliding scale   SubCutaneous every 6 hours  latanoprost 0.005% Ophthalmic Solution 1 Drop(s) Both EYES at bedtime  pantoprazole  Injectable 40 milliGRAM(s) IV Push daily  piperacillin/tazobactam IVPB.. 3.375 Gram(s) IV Intermittent every 12 hours  potassium phosphate IVPB 30 milliMole(s) IV Intermittent once    MEDICATIONS  (PRN):  dextrose Oral Gel 15 Gram(s) Oral once PRN Blood Glucose LESS THAN 70 milliGRAM(s)/deciliter  sodium chloride 0.9% Bolus. 100 milliLiter(s) IV Bolus every 5 minutes PRN SBP LESS THAN or EQUAL to 80 mmHg      CAPILLARY BLOOD GLUCOSE      POCT Blood Glucose.: 153 mg/dL (30 Jul 2024 12:14)  POCT Blood Glucose.: 82 mg/dL (30 Jul 2024 09:33)  POCT Blood Glucose.: 77 mg/dL (30 Jul 2024 08:32)  POCT Blood Glucose.: 81 mg/dL (30 Jul 2024 05:55)  POCT Blood Glucose.: 89 mg/dL (30 Jul 2024 00:53)  POCT Blood Glucose.: 140 mg/dL (29 Jul 2024 21:21)  POCT Blood Glucose.: 249 mg/dL (29 Jul 2024 14:14)    I&O's Summary    29 Jul 2024 07:01  -  30 Jul 2024 07:00  --------------------------------------------------------  IN: 500 mL / OUT: 2500 mL / NET: -2000 mL        Vital Signs Last 24 Hrs  T(C): 36.7 (30 Jul 2024 09:41), Max: 36.9 (29 Jul 2024 16:04)  T(F): 98 (30 Jul 2024 09:41), Max: 98.5 (29 Jul 2024 16:04)  HR: 63 (30 Jul 2024 09:41) (63 - 84)  BP: 109/98 (30 Jul 2024 09:41) (109/98 - 159/76)  BP(mean): --  RR: 19 (30 Jul 2024 09:41) (17 - 21)  SpO2: 95% (30 Jul 2024 09:41) (95% - 100%)    Parameters below as of 30 Jul 2024 09:41    O2 Flow (L/min): 3      PHYSICAL EXAM:  GENERAL: no distress  PSYCH: A&O x0  HEAD: Atraumatic, Normocephalic  NECK: Supple, No JVD  CHEST/LUNG: clear to auscultation bilaterally  HEART: regular rate and rhythm, no murmurs  ABDOMEN: nontender to palpation, no rebound tenderness/guarding  EXTREMITIES: no edema on bilateral LE +L AKA RBKA  NEUROLOGY: no focal neurologic deficit  SKIN: No rashes or lesions    LABS:                        8.6    4.02  )-----------( 182      ( 30 Jul 2024 09:03 )             28.8      07-30    139  |  98  |  43<H>  ----------------------------<  75  4.1   |  30  |  3.21<H>    Ca    8.8      30 Jul 2024 09:03  Phos  0.9     07-30  Mg     3.40     07-30    TPro  6.0  /  Alb  2.1<L>  /  TBili  <0.2  /  DBili  x   /  AST  24  /  ALT  23  /  AlkPhos  72  07-30    PT/INR - ( 29 Jul 2024 14:30 )   PT: 12.2 sec;   INR: 1.08 ratio         PTT - ( 29 Jul 2024 14:30 )  PTT:31.9 sec  CARDIAC MARKERS ( 30 Jul 2024 03:09 )  x     / x     / 74 U/L / x     / 1.4 ng/mL  CARDIAC MARKERS ( 29 Jul 2024 18:52 )  x     / x     / 55 U/L / x     / <1.0 ng/mL      Urinalysis Basic - ( 30 Jul 2024 09:03 )    Color: x / Appearance: x / SG: x / pH: x  Gluc: 75 mg/dL / Ketone: x  / Bili: x / Urobili: x   Blood: x / Protein: x / Nitrite: x   Leuk Esterase: x / RBC: x / WBC x   Sq Epi: x / Non Sq Epi: x / Bacteria: x        RADIOLOGY & ADDITIONAL TESTS:    Imaging Personally Reviewed:    Consultant(s) Notes Reviewed:      Care Discussed with Consultants/Other Providers:   Dottie Severino PGY3  pager [phone] or check resident tab for coverage    Patient is a 69y old  Male who presents with a chief complaint of expressive aphasia (30 Jul 2024 09:51)      SUBJECTIVE / OVERNIGHT EVENTS: Patient lethargic on interview;  able to open in eyes. Endorses "pain everywhere". Following commands intermittently. Unable to answer ROS questions     MEDICATIONS  (STANDING):  aspirin Suppository 300 milliGRAM(s) Rectal daily  chlorhexidine 2% Cloths 1 Application(s) Topical daily  dextrose 5%. 1000 milliLiter(s) (100 mL/Hr) IV Continuous <Continuous>  dextrose 5%. 1000 milliLiter(s) (50 mL/Hr) IV Continuous <Continuous>  dextrose 50% Injectable 25 Gram(s) IV Push once  dextrose 50% Injectable 25 Gram(s) IV Push once  dextrose 50% Injectable 12.5 Gram(s) IV Push once  epoetin dinah (EPOGEN) Injectable 45881 Unit(s) IV Push <User Schedule>  glucagon  Injectable 1 milliGRAM(s) IntraMuscular once  heparin   Injectable 5000 Unit(s) SubCutaneous every 12 hours  insulin lispro (ADMELOG) corrective regimen sliding scale   SubCutaneous every 6 hours  latanoprost 0.005% Ophthalmic Solution 1 Drop(s) Both EYES at bedtime  pantoprazole  Injectable 40 milliGRAM(s) IV Push daily  piperacillin/tazobactam IVPB.. 3.375 Gram(s) IV Intermittent every 12 hours  potassium phosphate IVPB 30 milliMole(s) IV Intermittent once    MEDICATIONS  (PRN):  dextrose Oral Gel 15 Gram(s) Oral once PRN Blood Glucose LESS THAN 70 milliGRAM(s)/deciliter  sodium chloride 0.9% Bolus. 100 milliLiter(s) IV Bolus every 5 minutes PRN SBP LESS THAN or EQUAL to 80 mmHg      CAPILLARY BLOOD GLUCOSE      POCT Blood Glucose.: 153 mg/dL (30 Jul 2024 12:14)  POCT Blood Glucose.: 82 mg/dL (30 Jul 2024 09:33)  POCT Blood Glucose.: 77 mg/dL (30 Jul 2024 08:32)  POCT Blood Glucose.: 81 mg/dL (30 Jul 2024 05:55)  POCT Blood Glucose.: 89 mg/dL (30 Jul 2024 00:53)  POCT Blood Glucose.: 140 mg/dL (29 Jul 2024 21:21)  POCT Blood Glucose.: 249 mg/dL (29 Jul 2024 14:14)    I&O's Summary    29 Jul 2024 07:01  -  30 Jul 2024 07:00  --------------------------------------------------------  IN: 500 mL / OUT: 2500 mL / NET: -2000 mL        Vital Signs Last 24 Hrs  T(C): 36.7 (30 Jul 2024 09:41), Max: 36.9 (29 Jul 2024 16:04)  T(F): 98 (30 Jul 2024 09:41), Max: 98.5 (29 Jul 2024 16:04)  HR: 63 (30 Jul 2024 09:41) (63 - 84)  BP: 109/98 (30 Jul 2024 09:41) (109/98 - 159/76)  BP(mean): --  RR: 19 (30 Jul 2024 09:41) (17 - 21)  SpO2: 95% (30 Jul 2024 09:41) (95% - 100%)    Parameters below as of 30 Jul 2024 09:41    O2 Flow (L/min): 3      PHYSICAL EXAM:  GENERAL: no distress  PSYCH: A&O x0  HEAD: Atraumatic, Normocephalic  NECK: Supple, No JVD  CHEST/LUNG: clear to auscultation bilaterally  HEART: regular rate and rhythm, no murmurs  ABDOMEN: nontender to palpation, no rebound tenderness/guarding  EXTREMITIES: no edema on bilateral LE +L AKA RBKA  NEUROLOGY: no focal neurologic deficit  SKIN: No rashes or lesions    LABS:                        8.6    4.02  )-----------( 182      ( 30 Jul 2024 09:03 )             28.8      07-30    139  |  98  |  43<H>  ----------------------------<  75  4.1   |  30  |  3.21<H>    Ca    8.8      30 Jul 2024 09:03  Phos  0.9     07-30  Mg     3.40     07-30    TPro  6.0  /  Alb  2.1<L>  /  TBili  <0.2  /  DBili  x   /  AST  24  /  ALT  23  /  AlkPhos  72  07-30    PT/INR - ( 29 Jul 2024 14:30 )   PT: 12.2 sec;   INR: 1.08 ratio         PTT - ( 29 Jul 2024 14:30 )  PTT:31.9 sec  CARDIAC MARKERS ( 30 Jul 2024 03:09 )  x     / x     / 74 U/L / x     / 1.4 ng/mL  CARDIAC MARKERS ( 29 Jul 2024 18:52 )  x     / x     / 55 U/L / x     / <1.0 ng/mL      Urinalysis Basic - ( 30 Jul 2024 09:03 )    Color: x / Appearance: x / SG: x / pH: x  Gluc: 75 mg/dL / Ketone: x  / Bili: x / Urobili: x   Blood: x / Protein: x / Nitrite: x   Leuk Esterase: x / RBC: x / WBC x   Sq Epi: x / Non Sq Epi: x / Bacteria: x        RADIOLOGY & ADDITIONAL TESTS:    Imaging Personally Reviewed:    Consultant(s) Notes Reviewed:      Care Discussed with Consultants/Other Providers:

## 2024-07-30 NOTE — SWALLOW BEDSIDE ASSESSMENT ADULT - COMMENTS
Neurology 7/29, "Impression: Unresponsiveness followed by dysarthria, diaphoresis, asterixis more likely due to diffuse cerebral dysfunction from toxic metabolic and/or infectious etiologies. Rule out non-motor seizure with impaired seizure. Consideration, but lower suspicion for acute ischemic stroke."    CT Chest 7/30: IMPRESSION: Increase in moderate right loculated pleural effusion. Stable small left pleural effusion. Similar appearance of rounded atelectasis in the left lower lobe.    CT Brain 7/29: IMPRESSION: 1.  No acute intracranial hemorrhage or acute retrievable clot. 2.  MRI would be more sensitive for acute ischemia. 3.  Stable chronic infarcts, senescent cerebral changes, and hyperdense mass at the left CPA. 4.  Proximal right ICA stenosis, 40-50%. 5.  Multifocal stenotic lesions in the basilar artery and both V4 vertebral segments. 6.  Correlate clinically for vertebrobasilar insufficiency. 7.  Heavily calcified chronic plaque with moderate to severe bilateral DENISE stenoses.    Of note: Patient known to this service from previous admission seen 5/4,     Patient received upright awake in bed, noted with O2 via nasal cannula. Patient able to make basic wants/ needs known in English. Neurology 7/29, "Impression: Unresponsiveness followed by dysarthria, diaphoresis, asterixis more likely due to diffuse cerebral dysfunction from toxic metabolic and/or infectious etiologies. Rule out non-motor seizure with impaired seizure. Consideration, but lower suspicion for acute ischemic stroke."    CT Chest 7/30: IMPRESSION: Increase in moderate right loculated pleural effusion. Stable small left pleural effusion. Similar appearance of rounded atelectasis in the left lower lobe.    CT Brain 7/29: IMPRESSION: 1.  No acute intracranial hemorrhage or acute retrievable clot. 2.  MRI would be more sensitive for acute ischemia. 3.  Stable chronic infarcts, senescent cerebral changes, and hyperdense mass at the left CPA. 4.  Proximal right ICA stenosis, 40-50%. 5.  Multifocal stenotic lesions in the basilar artery and both V4 vertebral segments. 6.  Correlate clinically for vertebrobasilar insufficiency. 7.  Heavily calcified chronic plaque with moderate to severe bilateral DENISE stenoses.    Of note: Patient known to this service from previous admission seen 5/4/2024 and previously 8/27/2023 with recommendations on both encounters to defer oral diet given patient refusal (see notes for details).     Patient received upright awake in bed, noted with O2 via nasal cannula. Patient able to make basic wants/ needs known in English. unable to adequately assess oral pharyngeal stages given limited acceptance of trials. Patient declined subsequent trials or additional PO trials (vanilla pudding, water, crackers) therefore no additional PO assessed.

## 2024-07-30 NOTE — H&P ADULT - PROBLEM SELECTOR PLAN 1
-TME unclear etiology  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  -f/u urinalysis reflex to culture  -obtain formal CT chest, pleural effusion noted on CT head, might have had reaccumulation of large pleural effusion (had prior thoracentesis)  -hx of prior MRSA bacteremia, f/u blood cultures  -vancomycin by level, zosyn 3.375 gram BID  -NPO pending speech/swallow studies  -if clinically not improving will need MR head noncontrast (has loop recorder needs clearance prior)

## 2024-07-30 NOTE — H&P ADULT - NSHPPHYSICALEXAM_GEN_ALL_CORE
PHYSICAL EXAM:  GENERAL: NAD, well-developed  HEAD:  Atraumatic, Normocephalic  EYES: EOMI, PERRLA, conjunctiva and sclera clear  NECK: Supple, No JVD  CHEST/LUNG: Clear to auscultation bilaterally; No wheeze  HEART: Regular rate and rhythm; No murmurs, rubs, or gallops  ABDOMEN: Soft, Nontender, Nondistended; Bowel sounds present  EXTREMITIES:  BKA/AKA, 2+ radial pulses, No clubbing, cyanosis, or edema  PSYCH: AAOx1 to self  NEUROLOGY: non-focal  SKIN: No rashes or lesions

## 2024-07-31 LAB
ALBUMIN SERPL ELPH-MCNC: 2.8 G/DL — LOW (ref 3.3–5)
ALP SERPL-CCNC: 100 U/L — SIGNIFICANT CHANGE UP (ref 40–120)
ALT FLD-CCNC: 26 U/L — SIGNIFICANT CHANGE UP (ref 4–41)
AMMONIA BLD-MCNC: 14 UMOL/L — SIGNIFICANT CHANGE UP (ref 11–55)
ANION GAP SERPL CALC-SCNC: 17 MMOL/L — HIGH (ref 7–14)
ANION GAP SERPL CALC-SCNC: 17 MMOL/L — HIGH (ref 7–14)
AST SERPL-CCNC: 29 U/L — SIGNIFICANT CHANGE UP (ref 4–40)
BILIRUB SERPL-MCNC: 0.2 MG/DL — SIGNIFICANT CHANGE UP (ref 0.2–1.2)
BUN SERPL-MCNC: 44 MG/DL — HIGH (ref 7–23)
BUN SERPL-MCNC: 46 MG/DL — HIGH (ref 7–23)
CALCIUM SERPL-MCNC: 7.7 MG/DL — LOW (ref 8.4–10.5)
CALCIUM SERPL-MCNC: 7.9 MG/DL — LOW (ref 8.4–10.5)
CHLORIDE SERPL-SCNC: 87 MMOL/L — LOW (ref 98–107)
CHLORIDE SERPL-SCNC: 88 MMOL/L — LOW (ref 98–107)
CO2 SERPL-SCNC: 25 MMOL/L — SIGNIFICANT CHANGE UP (ref 22–31)
CO2 SERPL-SCNC: 28 MMOL/L — SIGNIFICANT CHANGE UP (ref 22–31)
CREAT SERPL-MCNC: 3.94 MG/DL — HIGH (ref 0.5–1.3)
CREAT SERPL-MCNC: 4.1 MG/DL — HIGH (ref 0.5–1.3)
EGFR: 15 ML/MIN/1.73M2 — LOW
EGFR: 16 ML/MIN/1.73M2 — LOW
GLUCOSE BLDC GLUCOMTR-MCNC: 109 MG/DL — HIGH (ref 70–99)
GLUCOSE BLDC GLUCOMTR-MCNC: 117 MG/DL — HIGH (ref 70–99)
GLUCOSE BLDC GLUCOMTR-MCNC: 133 MG/DL — HIGH (ref 70–99)
GLUCOSE BLDC GLUCOMTR-MCNC: 384 MG/DL — HIGH (ref 70–99)
GLUCOSE BLDC GLUCOMTR-MCNC: 55 MG/DL — LOW (ref 70–99)
GLUCOSE BLDC GLUCOMTR-MCNC: 57 MG/DL — LOW (ref 70–99)
GLUCOSE BLDC GLUCOMTR-MCNC: 58 MG/DL — LOW (ref 70–99)
GLUCOSE BLDC GLUCOMTR-MCNC: 60 MG/DL — LOW (ref 70–99)
GLUCOSE BLDC GLUCOMTR-MCNC: 80 MG/DL — SIGNIFICANT CHANGE UP (ref 70–99)
GLUCOSE BLDC GLUCOMTR-MCNC: 85 MG/DL — SIGNIFICANT CHANGE UP (ref 70–99)
GLUCOSE BLDC GLUCOMTR-MCNC: 90 MG/DL — SIGNIFICANT CHANGE UP (ref 70–99)
GLUCOSE SERPL-MCNC: 260 MG/DL — HIGH (ref 70–99)
GLUCOSE SERPL-MCNC: 365 MG/DL — HIGH (ref 70–99)
HCT VFR BLD CALC: 18.2 % — CRITICAL LOW (ref 39–50)
HCT VFR BLD CALC: 26.7 % — LOW (ref 39–50)
HGB BLD-MCNC: 5.4 G/DL — CRITICAL LOW (ref 13–17)
HGB BLD-MCNC: 8 G/DL — LOW (ref 13–17)
MAGNESIUM SERPL-MCNC: 3.3 MG/DL — HIGH (ref 1.6–2.6)
MAGNESIUM SERPL-MCNC: 3.3 MG/DL — HIGH (ref 1.6–2.6)
MCHC RBC-ENTMCNC: 26.3 PG — LOW (ref 27–34)
MCHC RBC-ENTMCNC: 26.8 PG — LOW (ref 27–34)
MCHC RBC-ENTMCNC: 29.7 GM/DL — LOW (ref 32–36)
MCHC RBC-ENTMCNC: 30 GM/DL — LOW (ref 32–36)
MCV RBC AUTO: 88.8 FL — SIGNIFICANT CHANGE UP (ref 80–100)
MCV RBC AUTO: 89.3 FL — SIGNIFICANT CHANGE UP (ref 80–100)
NRBC # BLD: 0 /100 WBCS — SIGNIFICANT CHANGE UP (ref 0–0)
NRBC # BLD: 0 /100 WBCS — SIGNIFICANT CHANGE UP (ref 0–0)
NRBC # FLD: 0 K/UL — SIGNIFICANT CHANGE UP (ref 0–0)
NRBC # FLD: 0 K/UL — SIGNIFICANT CHANGE UP (ref 0–0)
PHOSPHATE SERPL-MCNC: 3.6 MG/DL — SIGNIFICANT CHANGE UP (ref 2.5–4.5)
PHOSPHATE SERPL-MCNC: 4 MG/DL — SIGNIFICANT CHANGE UP (ref 2.5–4.5)
PLATELET # BLD AUTO: 171 K/UL — SIGNIFICANT CHANGE UP (ref 150–400)
PLATELET # BLD AUTO: 178 K/UL — SIGNIFICANT CHANGE UP (ref 150–400)
POTASSIUM SERPL-MCNC: 4.5 MMOL/L — SIGNIFICANT CHANGE UP (ref 3.5–5.3)
POTASSIUM SERPL-MCNC: 5.1 MMOL/L — SIGNIFICANT CHANGE UP (ref 3.5–5.3)
POTASSIUM SERPL-SCNC: 4.5 MMOL/L — SIGNIFICANT CHANGE UP (ref 3.5–5.3)
POTASSIUM SERPL-SCNC: 5.1 MMOL/L — SIGNIFICANT CHANGE UP (ref 3.5–5.3)
PROT SERPL-MCNC: 6.9 G/DL — SIGNIFICANT CHANGE UP (ref 6–8.3)
RBC # BLD: 2.05 M/UL — LOW (ref 4.2–5.8)
RBC # BLD: 2.99 M/UL — LOW (ref 4.2–5.8)
RBC # FLD: 21 % — HIGH (ref 10.3–14.5)
RBC # FLD: 21.3 % — HIGH (ref 10.3–14.5)
SODIUM SERPL-SCNC: 129 MMOL/L — LOW (ref 135–145)
SODIUM SERPL-SCNC: 133 MMOL/L — LOW (ref 135–145)
TSH SERPL-MCNC: 1.45 UIU/ML — SIGNIFICANT CHANGE UP (ref 0.27–4.2)
VANCOMYCIN TROUGH SERPL-MCNC: 7.4 UG/ML — LOW (ref 10–20)
WBC # BLD: 3.53 K/UL — LOW (ref 3.8–10.5)
WBC # BLD: 3.84 K/UL — SIGNIFICANT CHANGE UP (ref 3.8–10.5)
WBC # FLD AUTO: 3.53 K/UL — LOW (ref 3.8–10.5)
WBC # FLD AUTO: 3.84 K/UL — SIGNIFICANT CHANGE UP (ref 3.8–10.5)

## 2024-07-31 PROCEDURE — 99232 SBSQ HOSP IP/OBS MODERATE 35: CPT | Mod: GC

## 2024-07-31 RX ORDER — MIRTAZAPINE 15 MG
7.5 TABLET ORAL DAILY
Refills: 0 | Status: DISCONTINUED | OUTPATIENT
Start: 2024-07-31 | End: 2024-08-03

## 2024-07-31 RX ORDER — DEXTROSE 50 % IN WATER 50 %
1000 SYRINGE (ML) INTRAVENOUS
Refills: 0 | Status: DISCONTINUED | OUTPATIENT
Start: 2024-07-31 | End: 2024-08-01

## 2024-07-31 RX ORDER — CARVEDILOL PHOSPHATE 80 MG
3.12 CAPSULE,EXTENDED RELEASE MULTIPHASE 24HR ORAL EVERY 12 HOURS
Refills: 0 | Status: DISCONTINUED | OUTPATIENT
Start: 2024-07-31 | End: 2024-08-06

## 2024-07-31 RX ORDER — DEXTROSE 4 G
25 TABLET,CHEWABLE ORAL ONCE
Refills: 0 | Status: DISCONTINUED | OUTPATIENT
Start: 2024-07-31 | End: 2024-07-31

## 2024-07-31 RX ORDER — DEXTROSE 4 G
12.5 TABLET,CHEWABLE ORAL ONCE
Refills: 0 | Status: COMPLETED | OUTPATIENT
Start: 2024-07-31 | End: 2024-07-31

## 2024-07-31 RX ORDER — ATORVASTATIN CALCIUM 40 MG/1
40 TABLET, FILM COATED ORAL AT BEDTIME
Refills: 0 | Status: DISCONTINUED | OUTPATIENT
Start: 2024-07-31 | End: 2024-08-06

## 2024-07-31 RX ORDER — ASPIRIN 500 MG
81 TABLET ORAL DAILY
Refills: 0 | Status: DISCONTINUED | OUTPATIENT
Start: 2024-08-01 | End: 2024-08-06

## 2024-07-31 RX ORDER — PREDNISOLONE ACETATE 10 MG/ML
1 SUSPENSION/ DROPS OPHTHALMIC
Refills: 0 | Status: DISCONTINUED | OUTPATIENT
Start: 2024-07-31 | End: 2024-08-06

## 2024-07-31 RX ORDER — DEXTROSE 50 % IN WATER 50 %
1000 SYRINGE (ML) INTRAVENOUS
Refills: 0 | Status: DISCONTINUED | OUTPATIENT
Start: 2024-07-31 | End: 2024-07-31

## 2024-07-31 RX ADMIN — MUPIROCIN CALCIUM 1 APPLICATION(S): 20 CREAM TOPICAL at 05:09

## 2024-07-31 RX ADMIN — EPOETIN ALFA 10000 UNIT(S): 3000 SOLUTION INTRAVENOUS; SUBCUTANEOUS at 18:49

## 2024-07-31 RX ADMIN — CHLORHEXIDINE GLUCONATE 1 APPLICATION(S): 500 CLOTH TOPICAL at 14:18

## 2024-07-31 RX ADMIN — PIPERACILLIN SODIUM, TAZOBACTAM SODIUM 25 GRAM(S): 3; .375 INJECTION, POWDER, LYOPHILIZED, FOR SOLUTION INTRAVENOUS at 05:09

## 2024-07-31 RX ADMIN — Medication 12.5 GRAM(S): at 09:57

## 2024-07-31 RX ADMIN — PANTOPRAZOLE SODIUM 40 MILLIGRAM(S): 20 TABLET, DELAYED RELEASE ORAL at 14:17

## 2024-07-31 RX ADMIN — MUPIROCIN CALCIUM 1 APPLICATION(S): 20 CREAM TOPICAL at 20:36

## 2024-07-31 RX ADMIN — HEPARIN SODIUM 5000 UNIT(S): 1000 INJECTION, SOLUTION INTRAVENOUS; SUBCUTANEOUS at 05:09

## 2024-07-31 RX ADMIN — HEPARIN SODIUM 5000 UNIT(S): 1000 INJECTION, SOLUTION INTRAVENOUS; SUBCUTANEOUS at 20:36

## 2024-07-31 RX ADMIN — Medication 1 DROP(S): at 21:32

## 2024-07-31 RX ADMIN — Medication 40 MILLILITER(S): at 20:37

## 2024-07-31 RX ADMIN — Medication 5: at 06:09

## 2024-07-31 RX ADMIN — Medication 300 MILLIGRAM(S): at 14:17

## 2024-07-31 RX ADMIN — PREDNISOLONE ACETATE 1 DROP(S): 10 SUSPENSION/ DROPS OPHTHALMIC at 21:14

## 2024-07-31 RX ADMIN — Medication 3.12 MILLIGRAM(S): at 20:36

## 2024-07-31 RX ADMIN — ATORVASTATIN CALCIUM 40 MILLIGRAM(S): 40 TABLET, FILM COATED ORAL at 20:41

## 2024-07-31 RX ADMIN — Medication 40 MILLILITER(S): at 15:49

## 2024-07-31 RX ADMIN — Medication 60 MILLILITER(S): at 15:18

## 2024-07-31 NOTE — SWALLOW BEDSIDE ASSESSMENT ADULT - ORAL PREPARATORY PHASE
Within functional limits Anterior loss of bolus presentations via single cup sips (greater on Left than Right); Suspect secondary to reduced labial seal. Slow/prolonged mastication and bolus manipulation.

## 2024-07-31 NOTE — PROGRESS NOTE ADULT - SUBJECTIVE AND OBJECTIVE BOX
Neurology Progress Note    S: Patient seen and examined.     Medication:  aspirin Suppository 300 milliGRAM(s) Rectal daily  carvedilol 3.125 milliGRAM(s) Oral every 12 hours  chlorhexidine 2% Cloths 1 Application(s) Topical daily  dextrose 5%. 1000 milliLiter(s) IV Continuous <Continuous>  dextrose 5%. 1000 milliLiter(s) IV Continuous <Continuous>  dextrose 50% Injectable 25 Gram(s) IV Push once  dextrose 50% Injectable 25 Gram(s) IV Push once  dextrose 50% Injectable 12.5 Gram(s) IV Push once  dextrose Oral Gel 15 Gram(s) Oral once PRN  epoetin dinah (EPOGEN) Injectable 22048 Unit(s) IV Push <User Schedule>  glucagon  Injectable 1 milliGRAM(s) IntraMuscular once  heparin   Injectable 5000 Unit(s) SubCutaneous every 12 hours  insulin lispro (ADMELOG) corrective regimen sliding scale   SubCutaneous every 6 hours  latanoprost 0.005% Ophthalmic Solution 1 Drop(s) Both EYES at bedtime  mupirocin 2% Ointment 1 Application(s) Both Nostrils two times a day  pantoprazole  Injectable 40 milliGRAM(s) IV Push daily  sodium chloride 0.9% Bolus. 100 milliLiter(s) IV Bolus every 5 minutes PRN      Vitals:  Vital Signs Last 24 Hrs  T(C): 36.7 (31 Jul 2024 05:00), Max: 36.8 (30 Jul 2024 16:38)  T(F): 98 (31 Jul 2024 05:00), Max: 98.3 (30 Jul 2024 16:38)  HR: 64 (31 Jul 2024 05:00) (62 - 78)  BP: 128/61 (31 Jul 2024 05:00) (109/56 - 128/61)  BP(mean): --  RR: 17 (31 Jul 2024 05:00) (17 - 18)  SpO2: 100% (31 Jul 2024 05:00) (96% - 100%)    Parameters below as of 31 Jul 2024 05:00  Patient On (Oxygen Delivery Method): nasal cannula  O2 Flow (L/min): 2      General:  Constitutional: Male, appears stated age, in no apparent distress including pain  Head: Normocephalic & Atraumatic.  Respiratory: Breathing comfortably.    Neurological:  MS: Eyes closed, open to noxious stimuli. AAox2  Language: Speech is garbled and difficult to understand, but can make out several words such as "I don't know what's wrong."    CNs: PERRL (R = 3mm, L = 3mm). No blink to threat b/l; patient able to make out shapes, but not objects or count fingers. Dysconjugate gaze w/ exodeviation. No facial asymmetry initially, on reassessment smile is symmetric. Moderate to severe dysarthria.     Motor: Normal muscle bulk & tone. Bilateral UE drifts, R>L, however there appears to be intermittent loss of tone consistent with asterixis. 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     Sensation: Grimaces symmetrically to noxious stimuli b/l.     Coordination: unable to assess due to visual acuity baseline.     Gait: Patient unable to assess at baseline.       I personally reviewed the below data/images/labs:      CBC Full  -  ( 31 Jul 2024 07:45 )  WBC Count : 3.53 K/uL  RBC Count : 2.99 M/uL  Hemoglobin : 8.0 g/dL  Hematocrit : 26.7 %  Platelet Count - Automated : 171 K/uL  Mean Cell Volume : 89.3 fL  Mean Cell Hemoglobin : 26.8 pg  Mean Cell Hemoglobin Concentration : 30.0 gm/dL  Auto Neutrophil # : x  Auto Lymphocyte # : x  Auto Monocyte # : x  Auto Eosinophil # : x  Auto Basophil # : x  Auto Neutrophil % : x  Auto Lymphocyte % : x  Auto Monocyte % : x  Auto Eosinophil % : x  Auto Basophil % : x  n  07-31    133<L>  |  88<L>  |  44<H>  ----------------------------<  260<H>  4.5   |  28  |  4.10<H>    Ca    7.9<L>      31 Jul 2024 07:45  Phos  3.6     07-31  Mg     3.30     07-31    TPro  6.9  /  Alb  2.8<L>  /  TBili  0.2  /  DBili  x   /  AST  29  /  ALT  26  /  AlkPhos  100  07-31    LIVER FUNCTIONS - ( 31 Jul 2024 06:05 )  Alb: 2.8 g/dL / Pro: 6.9 g/dL / ALK PHOS: 100 U/L / ALT: 26 U/L / AST: 29 U/L / GGT: x           PT/INR - ( 29 Jul 2024 14:30 )   PT: 12.2 sec;   INR: 1.08 ratio         PTT - ( 29 Jul 2024 14:30 )  PTT:31.9 sec  Urinalysis Basic - ( 31 Jul 2024 07:45 )    Color: x / Appearance: x / SG: x / pH: x  Gluc: 260 mg/dL / Ketone: x  / Bili: x / Urobili: x   Blood: x / Protein: x / Nitrite: x   Leuk Esterase: x / RBC: x / WBC x   Sq Epi: x / Non Sq Epi: x / Bacteria: x      CTH/CTA/CTP:    There are chronic infarcts in both PCA territories and in the cerebellum   bilaterally, unchanged. There is also a chronic infarct at the left   superior frontal gyrus. There are small chronic white matter infarcts in   the right corona radiata and centrum semiovale, better seen on theprior   MRI.    A hyperdense mass is noted at the left CPA. There are dense   calcifications at the interhemispheric fissure and in the left middle   frontal gyrus. There are extensive vascular calcifications within the ECA   branches in the soft tissues of the scalp. Accounting for differences in   technique, all of these findings appear unchanged.    Moderate generalized cerebral volume loss, with distention of the sulci   and concomitant ex-vacuo ventricular dilatation. Moderate to severe   nonspecific low attenuation in the periventricular and subcortical white   matter, likely due to small vessel disease.    No acute intracranial hemorrhage. No midline shift or herniation.    The visualized sinuses and mastoids are clear. Left lens implant. Limited   views of the orbits and visualized soft tissues of the neck, face, scalp,   skull base, and calvarium are otherwise unremarkable.    Using a threshold of 30%, there is an rCBF defect in the right PCA   territory measuring 5 mL, corresponding to a chronic infarct on the CT.   Using a threshold of 6s, there is a matching Tmax abnormality in the   right PCA territory measuring 5 mL. No evidence of a perfusion mismatch   to suggest brain tissue at risk, although MRI would be more sensitive for   acute ischemia.    There is calcified atheromatous plaque at the great vessel origins, with   mild narrowing less than 10%. The left vertebral artery arises directly   from the arch and is congenitally hypoplastic. There is calcified plaque   at the origin of the dominant right vertebral artery and at the right   V2/V3 segment, with mild narrowing.    The carotid bifurcations are not well seen due to patient   motion/swallowing artifact. On the right, there is heavily calcified   plaque with 40-50% stenosis of the proximal right ICA. On the left, there   is calcified plaque at the carotid bifurcation and ICA origin with less   than 10% narrowing.    Otherwise, the right and left ICAs, CCAs, vertebrals, subclavians, and   the brachiocephalic artery are negative. No ulcerated plaque or arterial   dissection.    Intracranially, there is calcified plaque with moderate right and   moderate to severe left V4 vertebral segment stenoses. The left vertebral   artery probably ends in a PICA, without joining the basilar. The basilar   artery is diffusely hypoplastic and at least moderately stenosed.   Correlate clinically for vertebrobasilar insufficiency.    There is heavily calcified plaque with mild to moderate narrowing of the   C4-C6 ICA segments bilaterally. There is fetal origin of the right PCA.   There are mild to moderate stenotic lesions in the M1 segment of the   right MCA and in the PCOM segment of the fetal right PCA. There is   heavily calcified plaque with moderate tosevere narrowing in both A2   segments distally. There is adequate runoff, so this could be   artifactually accentuated.    No intracranial aneurysms or large vessel occlusions. No large feeding   arteries or draining veins. The ACAs, MCAs, PCAs, andcarotid siphons are   otherwise negative. The basilar artery and V4 vertebral segments are   otherwise negative. Abnormalities of  vessels and distal   intracranial branches are beyond the resolution of this technique, and   catheter angiography would be more sensitive.    The dural venous sinuses are grossly patent, although this examination   wasn't optimized to evaluate the cerebral veins. Mild degenerative   changes are noted in the cervical spine. No gross evidence of an acute   fracture, although this examination wasn't optimized to evaluate the   spine. There are bilateral pleural effusions, with passive atelectasis.   There is septal thickening at the lung apices.    IMPRESSION:    1.  No acute intracranial hemorrhage or acute retrievable clot.  2.  MRI would be more sensitive for acute ischemia.  3.  Stable chronic infarcts, senescent cerebral changes, and hyperdense   mass at the left CPA.  4.  Proximal right ICA stenosis, 40-50%.  5.  Multifocal stenotic lesions in the basilar artery and both V4   vertebral segments.  6.  Correlate clinically for vertebrobasilar insufficiency.  7.  Heavily calcified chronic plaque with moderate to severe bilateral   DENISE stenoses.        MRI brain w/w/o contrast (Epilepsy protocol) 7/2024:    1. Unchanged left-sided CP angle vestibular schwannoma.    2. Multiple unchanged chronic intracranial findings, as discussed.    3. Similar-appearing extensive chronic white matter microvascular type   changes.    4. No evidence of acute intracranial hemorrhage or acute cerebral   ischemia.    EEG x 24 hours 7/2024:    Mild bilateral temporal focal cerebral dysfunction can be structural or functional in etiology.  No epileptiform abnormalities or seizures.

## 2024-07-31 NOTE — SWALLOW BEDSIDE ASSESSMENT ADULT - SWALLOW EVAL: PROGNOSIS
(continued) Patient with overt s/sx aspiration/penetration (throat clear) post oral-intake PO trials thin liquids, indicative of impaired airway protection. 4. Functional pharyngeal stage suspected for puree, soft and bite-size, and mildly-thick liquids as characterized by initiation of the pharyngeal swallow trigger and +hyolaryngeal excursion upon digital palpation. No overt s/sx aspiration/penetration observed post oral intake PO trials puree, soft and bite-size, and mildly-thick liquids.

## 2024-07-31 NOTE — SWALLOW BEDSIDE ASSESSMENT ADULT - COMMENTS
CXR 7/29: "IMPRESSION: Bibasilar hazy opacities and small pleural effusions, worse from prior imaging. Consistent with worsening pulmonary edema."    CT Brain 7/29: IMPRESSION: 1.  No acute intracranial hemorrhage or acute retrievable clot. 2.  MRI would be more sensitive for acute ischemia. 3.  Stable chronic infarcts, senescent cerebral changes, and hyperdense mass at the left CPA. 4.  Proximal right ICA stenosis, 40-50%. 5.  Multifocal stenotic lesions in the basilar artery and both V4 vertebral segments. 6.  Correlate clinically for vertebrobasilar insufficiency. 7.  Heavily calcified chronic plaque with moderate to severe bilateral DENISE stenoses.    Of Note 1: Patient known to this service from previous admission seen 5/4/2024 and previously 8/27/2023 with recommendations on both encounters to defer oral diet given patient refusal (see notes for details).    Of Note 2: Patient seen for initial Clinical Swallow Evaluation this admission on 7/31/24 with recommendations of: "1. Defer oral diet to MD given limited PO acceptance with ultimate refusal. 2. Objective testing not warranted at this time given clinical behavior/ refusal to adequately participate" (see note). CXR 7/29: "IMPRESSION: Bibasilar hazy opacities and small pleural effusions, worse from prior imaging. Consistent with worsening pulmonary edema."    CT Brain 7/29: "IMPRESSION: 1.  No acute intracranial hemorrhage or acute retrievable clot. 2.  MRI would be more sensitive for acute ischemia. 3.  Stable chronic infarcts, senescent cerebral changes, and hyperdense mass at the left CPA. 4.  Proximal right ICA stenosis, 40-50%. 5.  Multifocal stenotic lesions in the basilar artery and both V4 vertebral segments. 6.  Correlate clinically for vertebrobasilar insufficiency. 7.  Heavily calcified chronic plaque with moderate to severe bilateral DENISE stenoses."    Of Note 1: Patient known to this service from previous admission seen 5/4/2024 and previously 8/27/2023 with recommendations on both encounters to defer oral diet given patient refusal (see notes for details).    Of Note 2: Patient seen for initial Clinical Swallow Evaluation this admission on 7/31/24 with recommendations of: "1. Defer oral diet to MD given limited PO acceptance with ultimate refusal. 2. Objective testing not warranted at this time given clinical behavior/ refusal to adequately participate" (see note).    SLP communicated with RN upon arrival to unit; Reported patient weaned to room air this AM (SPO2 90-95%). Patient received resting reclined in bed and in no acute distress. Able to make wants/needs known via verbal communication. Patient agreeable to repositioning upright in bed at ~45%, only. Inconsistently followed verbal directives given maximal and multi-modal (i.e., verbal, visual, tactile) cueing. Orally receptive to PO trials with SLP.

## 2024-07-31 NOTE — PATIENT PROFILE ADULT - FUNCTIONAL ASSESSMENT - DAILY ACTIVITY SCORE.
Care Management Follow Up    Length of Stay (days): 9    Expected Discharge Date: 12/31/2023     Concerns to be Addressed:       Patient plan of care discussed at interdisciplinary rounds: Yes    Anticipated Discharge Disposition:  home     Additional Information:  Chart reviewed. No discharge needs at this time. Will continue to monitor.     Jose Piper RN       6

## 2024-07-31 NOTE — SWALLOW BEDSIDE ASSESSMENT ADULT - ORAL PHASE
Within functional limits Mild lingual surface stasis post primary-swallow; Cleared with cued liquid wash with mildly-thick liquids.

## 2024-07-31 NOTE — SWALLOW BEDSIDE ASSESSMENT ADULT - SWALLOW EVAL: ORAL MUSCULATURE
reduced participation vs. reduced command following (opened mouth only on command)
Patient did not follow commands/directives for participation in formal oral musculature examination despite maximal/multi-modal cueing and education/encouragement provided.

## 2024-07-31 NOTE — PROGRESS NOTE ADULT - SUBJECTIVE AND OBJECTIVE BOX
Dottie Severino PGY1  pager [phone] or check resident tab for coverage    Patient is a 69y old  Male who presents with a chief complaint of expressive aphasia (30 Jul 2024 15:36)      SUBJECTIVE / OVERNIGHT EVENTS:    MEDICATIONS  (STANDING):  aspirin Suppository 300 milliGRAM(s) Rectal daily  chlorhexidine 2% Cloths 1 Application(s) Topical daily  dextrose 5%. 1000 milliLiter(s) (100 mL/Hr) IV Continuous <Continuous>  dextrose 5%. 1000 milliLiter(s) (50 mL/Hr) IV Continuous <Continuous>  dextrose 50% Injectable 25 Gram(s) IV Push once  dextrose 50% Injectable 25 Gram(s) IV Push once  dextrose 50% Injectable 12.5 Gram(s) IV Push once  epoetin dinah (EPOGEN) Injectable 07428 Unit(s) IV Push <User Schedule>  glucagon  Injectable 1 milliGRAM(s) IntraMuscular once  heparin   Injectable 5000 Unit(s) SubCutaneous every 12 hours  insulin lispro (ADMELOG) corrective regimen sliding scale   SubCutaneous every 6 hours  latanoprost 0.005% Ophthalmic Solution 1 Drop(s) Both EYES at bedtime  mupirocin 2% Ointment 1 Application(s) Both Nostrils two times a day  pantoprazole  Injectable 40 milliGRAM(s) IV Push daily  piperacillin/tazobactam IVPB.. 3.375 Gram(s) IV Intermittent every 12 hours    MEDICATIONS  (PRN):  dextrose Oral Gel 15 Gram(s) Oral once PRN Blood Glucose LESS THAN 70 milliGRAM(s)/deciliter  sodium chloride 0.9% Bolus. 100 milliLiter(s) IV Bolus every 5 minutes PRN SBP LESS THAN or EQUAL to 80 mmHg      CAPILLARY BLOOD GLUCOSE      POCT Blood Glucose.: 384 mg/dL (31 Jul 2024 06:02)  POCT Blood Glucose.: 117 mg/dL (31 Jul 2024 00:15)  POCT Blood Glucose.: 81 mg/dL (30 Jul 2024 16:48)  POCT Blood Glucose.: 153 mg/dL (30 Jul 2024 12:14)  POCT Blood Glucose.: 82 mg/dL (30 Jul 2024 09:33)  POCT Blood Glucose.: 77 mg/dL (30 Jul 2024 08:32)    I&O's Summary      Vital Signs Last 24 Hrs  T(C): 36.7 (31 Jul 2024 05:00), Max: 36.8 (30 Jul 2024 16:38)  T(F): 98 (31 Jul 2024 05:00), Max: 98.3 (30 Jul 2024 16:38)  HR: 64 (31 Jul 2024 05:00) (62 - 78)  BP: 128/61 (31 Jul 2024 05:00) (109/56 - 128/61)  BP(mean): --  RR: 17 (31 Jul 2024 05:00) (17 - 19)  SpO2: 100% (31 Jul 2024 05:00) (95% - 100%)    Parameters below as of 31 Jul 2024 05:00  Patient On (Oxygen Delivery Method): nasal cannula  O2 Flow (L/min): 2      PHYSICAL EXAM:  GENERAL: no distress  PSYCH: A&O x3  HEAD: Atraumatic, Normocephalic  NECK: Supple, No JVD  CHEST/LUNG: clear to auscultation bilaterally  HEART: regular rate and rhythm, no murmurs  ABDOMEN: nontender to palpation, no rebound tenderness/guarding  EXTREMITIES: no edema on bilateral LE  NEUROLOGY: no focal neurologic deficit  SKIN: No rashes or lesions    LABS:                        5.4    3.84  )-----------( 178      ( 31 Jul 2024 06:05 )             18.2      07-31    129<L>  |  87<L>  |  46<H>  ----------------------------<  365<H>  5.1   |  25  |  3.94<H>    Ca    7.7<L>      31 Jul 2024 06:05  Phos  4.0     07-31  Mg     3.30     07-31    TPro  6.9  /  Alb  2.8<L>  /  TBili  0.2  /  DBili  x   /  AST  29  /  ALT  26  /  AlkPhos  100  07-31    PT/INR - ( 29 Jul 2024 14:30 )   PT: 12.2 sec;   INR: 1.08 ratio         PTT - ( 29 Jul 2024 14:30 )  PTT:31.9 sec  CARDIAC MARKERS ( 30 Jul 2024 03:09 )  x     / x     / 74 U/L / x     / 1.4 ng/mL  CARDIAC MARKERS ( 29 Jul 2024 18:52 )  x     / x     / 55 U/L / x     / <1.0 ng/mL      Urinalysis Basic - ( 31 Jul 2024 06:05 )    Color: x / Appearance: x / SG: x / pH: x  Gluc: 365 mg/dL / Ketone: x  / Bili: x / Urobili: x   Blood: x / Protein: x / Nitrite: x   Leuk Esterase: x / RBC: x / WBC x   Sq Epi: x / Non Sq Epi: x / Bacteria: x        RADIOLOGY & ADDITIONAL TESTS:    Imaging Personally Reviewed:    Consultant(s) Notes Reviewed:      Care Discussed with Consultants/Other Providers:   Dottie Severino PGY3  pager [phone] or check resident tab for coverage    Patient is a 69y old  Male who presents with a chief complaint of expressive aphasia (30 Jul 2024 15:36)      SUBJECTIVE / OVERNIGHT EVENTS: Patient more alert and interactive this AM. Endorses back pain. Patient still unable to endorse orientation; thinks he is at his house. Denies f/c/n/v/d/c. Oriented to self. ROS limited.    MEDICATIONS  (STANDING):  aspirin Suppository 300 milliGRAM(s) Rectal daily  chlorhexidine 2% Cloths 1 Application(s) Topical daily  dextrose 5%. 1000 milliLiter(s) (100 mL/Hr) IV Continuous <Continuous>  dextrose 5%. 1000 milliLiter(s) (50 mL/Hr) IV Continuous <Continuous>  dextrose 50% Injectable 25 Gram(s) IV Push once  dextrose 50% Injectable 25 Gram(s) IV Push once  dextrose 50% Injectable 12.5 Gram(s) IV Push once  epoetin dinah (EPOGEN) Injectable 85715 Unit(s) IV Push <User Schedule>  glucagon  Injectable 1 milliGRAM(s) IntraMuscular once  heparin   Injectable 5000 Unit(s) SubCutaneous every 12 hours  insulin lispro (ADMELOG) corrective regimen sliding scale   SubCutaneous every 6 hours  latanoprost 0.005% Ophthalmic Solution 1 Drop(s) Both EYES at bedtime  mupirocin 2% Ointment 1 Application(s) Both Nostrils two times a day  pantoprazole  Injectable 40 milliGRAM(s) IV Push daily  piperacillin/tazobactam IVPB.. 3.375 Gram(s) IV Intermittent every 12 hours    MEDICATIONS  (PRN):  dextrose Oral Gel 15 Gram(s) Oral once PRN Blood Glucose LESS THAN 70 milliGRAM(s)/deciliter  sodium chloride 0.9% Bolus. 100 milliLiter(s) IV Bolus every 5 minutes PRN SBP LESS THAN or EQUAL to 80 mmHg      CAPILLARY BLOOD GLUCOSE      POCT Blood Glucose.: 384 mg/dL (31 Jul 2024 06:02)  POCT Blood Glucose.: 117 mg/dL (31 Jul 2024 00:15)  POCT Blood Glucose.: 81 mg/dL (30 Jul 2024 16:48)  POCT Blood Glucose.: 153 mg/dL (30 Jul 2024 12:14)  POCT Blood Glucose.: 82 mg/dL (30 Jul 2024 09:33)  POCT Blood Glucose.: 77 mg/dL (30 Jul 2024 08:32)    I&O's Summary      Vital Signs Last 24 Hrs  T(C): 36.7 (31 Jul 2024 05:00), Max: 36.8 (30 Jul 2024 16:38)  T(F): 98 (31 Jul 2024 05:00), Max: 98.3 (30 Jul 2024 16:38)  HR: 64 (31 Jul 2024 05:00) (62 - 78)  BP: 128/61 (31 Jul 2024 05:00) (109/56 - 128/61)  BP(mean): --  RR: 17 (31 Jul 2024 05:00) (17 - 19)  SpO2: 100% (31 Jul 2024 05:00) (95% - 100%)    Parameters below as of 31 Jul 2024 05:00  Patient On (Oxygen Delivery Method): nasal cannula  O2 Flow (L/min): 2      PHYSICAL EXAM:  GENERAL: no distress  PSYCH: A&O x1  HEAD: Atraumatic, Normocephalic  NECK: Supple, No JVD  CHEST/LUNG: clear to auscultation bilaterally  HEART: regular rate and rhythm, no murmurs  ABDOMEN: nontender to palpation, no rebound tenderness/guarding  EXTREMITIES: +R BKA +LAKA  NEUROLOGY: no focal neurologic deficit  SKIN: No rashes or lesions    LABS:                        5.4    3.84  )-----------( 178      ( 31 Jul 2024 06:05 )             18.2      07-31    129<L>  |  87<L>  |  46<H>  ----------------------------<  365<H>  5.1   |  25  |  3.94<H>    Ca    7.7<L>      31 Jul 2024 06:05  Phos  4.0     07-31  Mg     3.30     07-31    TPro  6.9  /  Alb  2.8<L>  /  TBili  0.2  /  DBili  x   /  AST  29  /  ALT  26  /  AlkPhos  100  07-31    PT/INR - ( 29 Jul 2024 14:30 )   PT: 12.2 sec;   INR: 1.08 ratio         PTT - ( 29 Jul 2024 14:30 )  PTT:31.9 sec  CARDIAC MARKERS ( 30 Jul 2024 03:09 )  x     / x     / 74 U/L / x     / 1.4 ng/mL  CARDIAC MARKERS ( 29 Jul 2024 18:52 )  x     / x     / 55 U/L / x     / <1.0 ng/mL      Urinalysis Basic - ( 31 Jul 2024 06:05 )    Color: x / Appearance: x / SG: x / pH: x  Gluc: 365 mg/dL / Ketone: x  / Bili: x / Urobili: x   Blood: x / Protein: x / Nitrite: x   Leuk Esterase: x / RBC: x / WBC x   Sq Epi: x / Non Sq Epi: x / Bacteria: x        RADIOLOGY & ADDITIONAL TESTS:    Imaging Personally Reviewed:    Consultant(s) Notes Reviewed:      Care Discussed with Consultants/Other Providers:

## 2024-07-31 NOTE — PROVIDER CONTACT NOTE (CRITICAL VALUE NOTIFICATION) - BACKGROUND
Dx: altered mental status from dialysis .. PMH: DM amputee B/L
DX: alter mental status from dialysis  PMH: ESTEPHANIA orellana
Dx: altered mental status from dialysis .. PMH: DM amputee B/L

## 2024-07-31 NOTE — SWALLOW BEDSIDE ASSESSMENT ADULT - ASR SWALLOW RECOMMEND DIAG
2. Objective testing not indicated at this time given presence of overt s/sx aspiration/penetration (immediate throat clear) with thin liquid trials.
Objective testing not warranted at this time given clinical behavior/ refusal to adequately participate

## 2024-07-31 NOTE — PROGRESS NOTE ADULT - PROBLEM SELECTOR PLAN 6
F- restrict if able to resume diet  E-replete prn  N-NPO pending S/S  heparin subQ F- restrict if able to resume diet  E-replete prn  N-pureed with mod thick liquids  heparin subQ

## 2024-07-31 NOTE — PROGRESS NOTE ADULT - ASSESSMENT
69M with ESRD on HD left AVF MWFr, OU blindness with bilateral occipital lobes chronic infarcts (on ASA 81 mg qd), BKA/AKA functional quadriplegia, CAD, HTN, HLD, Dementia, recent admission for unresponsiveness in early 7/2024 found to be in urosepsis, presents to [facility] ED as code stroke for unresponsiveness. LKW approximately 12 pm.  Before receiving dialysis, patient was found to be unresponsive to voice so EMS was called.  NIHSS 13 initially  MRS 5  Now AAOx 2 on exam but lethargic  CTH with known chronic infarcts, L CPA mass   CTA with known posterior circulation stenosis   Deemed not tnk/thrombectomy candidate    Impression: Unresponsiveness followed by dysarthria, diaphoresis, asterixis more likely due to diffuse cerebral dysfunction from toxic metabolic and/or infectious etiologies - likely anemia related     Recommendations     [] aspirin when stable per primary team  [] cna likely hold off on MRI brain and eeg unless not back to baseline  [] infectious workup per primary team  [] abx per primary team - avoid neurotoxic abx  [] C/w home Atorvastatin 40 mg qhs   [] Okay to resume other home medications including for BP if no other contraindications  [] Neurochecks: q4hr   [] BP goals: No need for permissive HTN. C/w home medications and follow long term management goals for HTN. Avoid hypotension (<90/60 mmHg) if possible.   [] PT/OT - back to nursing facility when ready for discharge  [] Diet: Dysphagia screening otherwise swallow evaluation.   [] HgA1C goals < 7.0   [] Lifestyle modifications: smoking cessation, exercise, and a Mediterranean style diet.   [] if patient has a convulsion, please document accurately the length of the episode, and specifically what the patient was doing paying attention to eye opening vs closure, gaze deviation, shaking of extremities, tongue bite, urinary incontinence, any derangement of vital signs  [] Fall Precautions  [] Seizure Precautions  [] Aspiration Precautions  [] Upon discharge, patient can follow up with Dr. Corona;   [address] [place] [address]. Call [phone].    69M with ESRD on HD left AVF MWFr, OU blindness with bilateral occipital lobes chronic infarcts (on ASA 81 mg qd), BKA/AKA functional quadriplegia, CAD, HTN, HLD, Dementia, recent admission for unresponsiveness in early 7/2024 found to be in urosepsis, presents to [facility] ED as code stroke for unresponsiveness. LKW approximately 12 pm.  Before receiving dialysis, patient was found to be unresponsive to voice so EMS was called.  NIHSS 13 initially  MRS 5  Now AAOx 2 on exam but lethargic  CTH with known chronic infarcts, L CPA mass   CTA with known posterior circulation stenosis   Deemed not tnk/thrombectomy candidate    Impression: Unresponsiveness followed by dysarthria, diaphoresis, asterixis more likely due to diffuse cerebral dysfunction from toxic metabolic and/or infectious etiologies    Recommendations     [] aspirin for seocndary stroke prevention  [] would get MRI brain and eeg   [] can   [] infectious workup per primary team  [] abx per primary team - avoid neurotoxic abx  [] C/w home Atorvastatin 40 mg qhs   [] Okay to resume other home medications including for BP if no other contraindications  [] Neurochecks: q4hr   [] BP goals: No need for permissive HTN. C/w home medications and follow long term management goals for HTN. Avoid hypotension (<90/60 mmHg) if possible.   [] PT/OT - back to nursing facility when ready for discharge  [] Diet: Dysphagia screening otherwise swallow evaluation.   [] HgA1C goals < 7.0   [] Lifestyle modifications: smoking cessation, exercise, and a Mediterranean style diet.   [] if patient has a convulsion, please document accurately the length of the episode, and specifically what the patient was doing paying attention to eye opening vs closure, gaze deviation, shaking of extremities, tongue bite, urinary incontinence, any derangement of vital signs  [] Fall Precautions  [] Seizure Precautions  [] Aspiration Precautions  [] Upon discharge, patient can follow up with Dr. Corona;   [address] [facility] [address]. Call [phone].

## 2024-07-31 NOTE — SWALLOW BEDSIDE ASSESSMENT ADULT - CONSISTENCIES ADMINISTERED
2 ounces via teaspoon and single cup sips/thin liquid 3 ounces/mildly thick 1/2 cracker/soft & bite-sized 1 teaspoon, only; Patient declined additional PO trials puree as evidenced by stating, "no more."/pureed

## 2024-07-31 NOTE — PROGRESS NOTE ADULT - SUBJECTIVE AND OBJECTIVE BOX
[facility]   MD MARIBELL CARRION MD ANGELA WONG, PA    TEL:  OFFICE: [phone]  From 5pm-7am Answering Service [phone]    -- RENAL FOLLOW UP NOTE ---Date of Service 07-31-24 @ 15:46    Patient is a 69y old  Male who presents with a chief complaint of expressive aphasia (31 Jul 2024 12:06)      Patient seen and examined at bedside.    VITALS:  T(F): 97.7 (07-31-24 @ 15:15), Max: 98.3 (07-30-24 @ 16:38)  HR: 62 (07-31-24 @ 15:15)  BP: 120/60 (07-31-24 @ 15:15)  RR: 14 (07-31-24 @ 15:15)  SpO2: 95% (07-31-24 @ 15:15)  Wt(kg): --        PHYSICAL EXAM:  General: lethargic  Neck: No JVD  Respiratory: CTAB, no wheezes, rales or rhonchi  Cardiovascular: S1, S2, RRR  Gastrointestinal: BS+, soft, NT/ND  Extremities: b/l amputation     Hospital Medications:   MEDICATIONS  (STANDING):  aspirin Suppository 300 milliGRAM(s) Rectal daily  carvedilol 3.125 milliGRAM(s) Oral every 12 hours  chlorhexidine 2% Cloths 1 Application(s) Topical daily  dextrose 10%. 1000 milliLiter(s) (40 mL/Hr) IV Continuous <Continuous>  dextrose 5%. 1000 milliLiter(s) (50 mL/Hr) IV Continuous <Continuous>  dextrose 5%. 1000 milliLiter(s) (100 mL/Hr) IV Continuous <Continuous>  dextrose 50% Injectable 25 Gram(s) IV Push once  dextrose 50% Injectable 12.5 Gram(s) IV Push once  dextrose 50% Injectable 25 Gram(s) IV Push once  epoetin dinah (EPOGEN) Injectable 79002 Unit(s) IV Push <User Schedule>  glucagon  Injectable 1 milliGRAM(s) IntraMuscular once  heparin   Injectable 5000 Unit(s) SubCutaneous every 12 hours  insulin lispro (ADMELOG) corrective regimen sliding scale   SubCutaneous every 6 hours  latanoprost 0.005% Ophthalmic Solution 1 Drop(s) Both EYES at bedtime  mupirocin 2% Ointment 1 Application(s) Both Nostrils two times a day  pantoprazole  Injectable 40 milliGRAM(s) IV Push daily      LABS:  07-31    133<L>  |  88<L>  |  44<H>  ----------------------------<  260<H>  4.5   |  28  |  4.10<H>    Ca    7.9<L>      31 Jul 2024 07:45  Phos  3.6     07-31  Mg     3.30     07-31    TPro  6.9  /  Alb  2.8<L>  /  TBili  0.2  /  DBili      /  AST  29  /  ALT  26  /  AlkPhos  100  07-31    Creatinine Trend: 4.10 <--, 3.94 <--, 3.21 <--, 2.42 <--, 4.93 <--    Phosphorus: 3.6 mg/dL (07-31 @ 07:45)  Phosphorus: 4.0 mg/dL (07-31 @ 06:05)  Albumin: 2.8 g/dL (07-31 @ 06:05)                              8.0    3.53  )-----------( 171      ( 31 Jul 2024 07:45 )             26.7     Urine Studies:  Urinalysis - [07-31-24 @ 07:45]      Color  / Appearance  / SG  / pH       Gluc 260 / Ketone   / Bili  / Urobili        Blood  / Protein  / Leuk Est  / Nitrite       RBC  / WBC  / Hyaline  / Gran  / Sq Epi  / Non Sq Epi  / Bacteria       Iron 42, TIBC 127, %sat 33      [05-11-24 @ 07:26]  Ferritin 1680      [05-11-24 @ 07:26]  PTH -- (Ca --)      [04-28-24 @ 05:00]   155  TSH 1.45      [07-31-24 @ 07:45]  Lipid: chol 119, TG 66, HDL 46, LDL --      [10-07-23 @ 09:30]    HBsAb 55.8      [07-03-24 @ 23:30]  HBsAg Nonreact      [07-03-24 @ 23:30]  HBcAb Nonreact      [07-03-24 @ 23:30]  HCV 0.11, Nonreact      [07-03-24 @ 23:30]      RADIOLOGY & ADDITIONAL STUDIES:

## 2024-07-31 NOTE — PATIENT PROFILE ADULT - FALL HARM RISK - RISK INTERVENTIONS

## 2024-07-31 NOTE — PROGRESS NOTE ADULT - PROBLEM SELECTOR PLAN 3
-as above; trops flatlined however likely in the setting of ESRD. Stable EKG. Clinical picture dry/euvolemic.   -Patient oliguric  -daily mag and phos  -HD MWF. -as above; trops flatlined however likely in the setting of ESRD. Stable EKG. Clinical picture dry/euvolemic.   -AVF site non infected; palpable thrill  -Patient oliguric  -daily mag and phos  -HD MWF.

## 2024-07-31 NOTE — PROGRESS NOTE ADULT - PROBLEM SELECTOR PLAN 1
-TME unclear etiology--previously with similar admission.  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  -f/u urinalysis reflex to culture  -obtain formal CT chest, pleural effusion noted on CT head, might have had reaccumulation of large pleural effusion (had prior thoracentesis 8/2023 with transudative effusion)  -hx of prior MRSA bacteremia, f/u blood cultures, UA and Urine cx--unclear infectious source at this time  -vancomycin by level, zosyn 3.375 gram BID  -NPO pending speech/swallow studies  -if clinically not improving will need MR head noncontrast (has loop recorder needs clearance prior)  -Will obtain collateral -TME unclear etiology--previously with similar admission.  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  -f/u urinalysis reflex to culture  -obtain formal CT chest, pleural effusion noted on CT head, might have had reaccumulation of large pleural effusion (had prior thoracentesis 8/2023 with transudative effusion)  -hx of prior MRSA bacteremia, f/u blood cultures, UA and Urine cx--unclear infectious source at this time  -vancomycin by level, zosyn 3.375 gram BID  -NPO pending speech/swallow studies  -if clinically not improving will need MR head noncontrast (has loop recorder needs clearance prior)  -Obtained collateral patient more somnolent over the weekend, thought he wanted to go home to meet a family from his dialysis center per niece (HCP), endorses not remembering that his family member had passed a few years. Somewhat conversational at baseline but not always with baseline orientation around AOx2. No recent infections, chills, bleeding etc symptoms leading up to presentation.   -Will f/u neuro if anything new; recent MR and EEG negative two weeks ago. Clinical presentation and w/u not consistent with acute ischemic stroke. -TME unclear etiology--previously with similar admission.  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  -f/u urinalysis reflex to culture  -obtain formal CT chest, pleural effusion noted on CT head, might have had reaccumulation of large pleural effusion (had prior thoracentesis 8/2023 with transudative effusion)  -hx of prior MRSA bacteremia, f/u blood cultures, UA and Urine cx--unclear infectious source at this time  -vancomycin by level, zosyn 3.375 gram BID  -if clinically not improving will need MR head noncontrast (has loop recorder needs clearance prior)  -Obtained collateral patient more somnolent over the weekend, thought he wanted to go home to meet a family from his dialysis center per niece (HCP), endorses not remembering that his family member had passed a few years. Somewhat conversational at baseline but not always with baseline orientation around AOx2. No recent infections, chills, bleeding etc symptoms leading up to presentation.   -Will f/u neuro if anything new; recent MR and EEG negative two weeks ago. Clinical presentation and w/u not consistent with acute ischemic stroke.

## 2024-07-31 NOTE — CHART NOTE - NSCHARTNOTEFT_GEN_A_CORE
Notified by RN of critical lab value Hgb 5.2.    Labs reviewed and appears to be dilution in setting of Zosyn running at time of blood draw. Repeat labs ordered. Sign out given to Dr. Severino.    Mary Jones PA-C  Department of Internal Medicine  pager [phone], available on [employer] TEAMS

## 2024-07-31 NOTE — SWALLOW BEDSIDE ASSESSMENT ADULT - SWALLOW EVAL: DIAGNOSIS
Overall limited assessment. Patient initially declined to participate in PO trials; however, given ample encouragement from SLP patient agreeable to apple juice. Patient accepted small cup sips of mildly thick liquids x2 (apple juice) and 1 1/2 teaspoon trial of puree. Patient subsequently with tight labial seal and turning head away from utensil. Patient offered additional PO trials (vanilla pudding, water, crackers) and despite ongoing encouragement from SLP patient continued to decline. Patient stated "No, leave me alone." Unable to adequately assess oral pharyngeal stages given limited PO acceptance with ultimate refusal of sufficient trials.
1. Mild-moderate oral stage for soft and bite-size and thin liquids as characterized by adequate oral acceptance, reduced oral containment for thin liquids via single cup sips given trace anterior loss (suspect exacerbated by reduced labial seal; greater for left than right), slow/prolonged mastication for soft and bite-size, adequate anterior-posterior transport, and reduced oral clearance for soft and bite-size with mild lingual surface stasis post primary-swallow (cleared with cued liquid wash with mildly-thick liquids). 2. Functional oral stage for puree and mildly-thick liquids as characterized by adequate oral acceptance/containment, adequate anterior-posterior transport, and adequate oral clearance. 3. Moderate-severe pharyngeal stage for thin liquids as characterized by suspected delay in initiation of the pharyngeal swallow trigger and +hyolaryngeal excursion upon digital palpation (continued below).

## 2024-07-31 NOTE — PROVIDER CONTACT NOTE (CRITICAL VALUE NOTIFICATION) - ACTION/TREATMENT ORDERED:
labs drawn to recheck values and labs sent
provider made aware of lab value
provider made aware of lab value. will continue to monitor

## 2024-07-31 NOTE — SWALLOW BEDSIDE ASSESSMENT ADULT - ASR SWALLOW LABIAL MOBILITY
adequate labial seal to teaspoon/ cup presentation
Suspect reduced labial seal given anterior loss (L>R).

## 2024-07-31 NOTE — PROGRESS NOTE ADULT - ASSESSMENT
69 year old M hx of ESRD on HD left AVF MWFr, CVA w/ gliosis, BKA/AKA functional quadriplegia, CAD, HTN, HLD, sent from dialysis unit for AMS. nephrology consulted for dialysis needs    ESRD:  On HD TIW via [facility].  Schedule is MWF. Consent obtained and charted from HCP Ramonita Vincent [phone]  s/p hd 7/29 uf 2L  hd today  - Renal diet.    AMS  s/p stroke code  f/u neuro    anemia  below goal   ct head done without acute finding  epo with hd  monitor    htn  acceptable  monitor

## 2024-07-31 NOTE — SWALLOW BEDSIDE ASSESSMENT ADULT - SWALLOW EVAL: RECOMMENDED FEEDING/EATING TECHNIQUES
allow for swallow between intakes/alternate food with liquid/check mouth frequently for oral residue/pocketing/oral hygiene/small sips/bites

## 2024-07-31 NOTE — SWALLOW BEDSIDE ASSESSMENT ADULT - ADDITIONAL RECOMMENDATIONS
1. Medical team advised to reconsult this service if patient is with a change in medical status/ willing to participate in PO trials.
Medical team advised to reconsult this service if there is change in patient's medical status or observed tolerance of recommended PO diet. This service to follow to assess/monitor diet tolerance as schedule permits.

## 2024-07-31 NOTE — SWALLOW BEDSIDE ASSESSMENT ADULT - SWALLOW EVAL: RECOMMENDED DIET
1. Defer oral diet to MD given limited PO acceptance with ultimate refusal
1. Minced and Moist with Mildly-Thick Liquids.

## 2024-07-31 NOTE — PROGRESS NOTE ADULT - PROBLEM SELECTOR PLAN 2
-CT chest with Similar appearance of a rounded consolidation in the   left lower lobe adjacent to the pleura with associated architectural   distortion of the surrounding bronchovascular bundles. Increase in right  moderate loculated pleural effusion with associated atelectasis of the   right lower lobe.  -f/u pulm recs re thora vs any concern for PNA in; past ones have been transudative. -CT chest with Similar appearance of a rounded consolidation in the   left lower lobe adjacent to the pleura with associated architectural   distortion of the surrounding bronchovascular bundles. Increase in right  moderate loculated pleural effusion with associated atelectasis of the   right lower lobe.  -f/u pulm recs re thora vs any concern for PNA in; past ones have been transudative but will clarify re consolidative mass.  -f/u sputum cx

## 2024-07-31 NOTE — SWALLOW BEDSIDE ASSESSMENT ADULT - H & P REVIEW
H&P 7/30, "69-year-old male with past medical history of ESRD on hemodialysis MWedFr left AV fistula, HTN, HLD, CAD, CVA, dementia a/o times 1-2 baseline who arrives due to minimal responsiveness during hemodialysis."/yes
Progress Note-Neuro 7/31: "69M with ESRD on HD left AVF MWFr, OU blindness with bilateral occipital lobes chronic infarcts (on ASA 81 mg qd), BKA/AKA functional quadriplegia, CAD, HTN, HLD, Dementia, recent admission for unresponsiveness in early 7/2024 found to be in urosepsis, presents to [facility] ED as code stroke for unresponsiveness. LKW approximately 12 pm.  Before receiving dialysis, patient was found to be unresponsive to voice so EMS was called. NIHSS 13 initially. MRS 5. Now AAOx 2 on exam but lethargic. CTH with known chronic infarcts, L CPA mass. CTA with known posterior circulation stenosis. Deemed not tnk/thrombectomy candidate. Impression: Unresponsiveness followed by dysarthria, diaphoresis, asterixis more likely due to diffuse cerebral dysfunction from toxic metabolic and/or infectious etiologies."

## 2024-08-01 LAB
ALBUMIN SERPL ELPH-MCNC: 3 G/DL — LOW (ref 3.3–5)
ALP SERPL-CCNC: 103 U/L — SIGNIFICANT CHANGE UP (ref 40–120)
ALT FLD-CCNC: 26 U/L — SIGNIFICANT CHANGE UP (ref 4–41)
ANION GAP SERPL CALC-SCNC: 12 MMOL/L — SIGNIFICANT CHANGE UP (ref 7–14)
AST SERPL-CCNC: 24 U/L — SIGNIFICANT CHANGE UP (ref 4–40)
BILIRUB SERPL-MCNC: 0.3 MG/DL — SIGNIFICANT CHANGE UP (ref 0.2–1.2)
BLD GP AB SCN SERPL QL: NEGATIVE — SIGNIFICANT CHANGE UP
BUN SERPL-MCNC: 22 MG/DL — SIGNIFICANT CHANGE UP (ref 7–23)
CALCIUM SERPL-MCNC: 8.3 MG/DL — LOW (ref 8.4–10.5)
CHLORIDE SERPL-SCNC: 98 MMOL/L — SIGNIFICANT CHANGE UP (ref 98–107)
CO2 SERPL-SCNC: 27 MMOL/L — SIGNIFICANT CHANGE UP (ref 22–31)
CREAT SERPL-MCNC: 2.98 MG/DL — HIGH (ref 0.5–1.3)
EGFR: 22 ML/MIN/1.73M2 — LOW
GLUCOSE BLDC GLUCOMTR-MCNC: 110 MG/DL — HIGH (ref 70–99)
GLUCOSE BLDC GLUCOMTR-MCNC: 136 MG/DL — HIGH (ref 70–99)
GLUCOSE BLDC GLUCOMTR-MCNC: 138 MG/DL — HIGH (ref 70–99)
GLUCOSE BLDC GLUCOMTR-MCNC: 145 MG/DL — HIGH (ref 70–99)
GLUCOSE BLDC GLUCOMTR-MCNC: 91 MG/DL — SIGNIFICANT CHANGE UP (ref 70–99)
GLUCOSE BLDC GLUCOMTR-MCNC: 91 MG/DL — SIGNIFICANT CHANGE UP (ref 70–99)
GLUCOSE SERPL-MCNC: 76 MG/DL — SIGNIFICANT CHANGE UP (ref 70–99)
HCT VFR BLD CALC: 28.6 % — LOW (ref 39–50)
HGB BLD-MCNC: 8.8 G/DL — LOW (ref 13–17)
MAGNESIUM SERPL-MCNC: 2.8 MG/DL — HIGH (ref 1.6–2.6)
MCHC RBC-ENTMCNC: 26.6 PG — LOW (ref 27–34)
MCHC RBC-ENTMCNC: 30.8 GM/DL — LOW (ref 32–36)
MCV RBC AUTO: 86.4 FL — SIGNIFICANT CHANGE UP (ref 80–100)
NRBC # BLD: 0 /100 WBCS — SIGNIFICANT CHANGE UP (ref 0–0)
NRBC # FLD: 0 K/UL — SIGNIFICANT CHANGE UP (ref 0–0)
PHOSPHATE SERPL-MCNC: 2.2 MG/DL — LOW (ref 2.5–4.5)
PLATELET # BLD AUTO: 183 K/UL — SIGNIFICANT CHANGE UP (ref 150–400)
POTASSIUM SERPL-MCNC: 4 MMOL/L — SIGNIFICANT CHANGE UP (ref 3.5–5.3)
POTASSIUM SERPL-SCNC: 4 MMOL/L — SIGNIFICANT CHANGE UP (ref 3.5–5.3)
PROT SERPL-MCNC: 7.3 G/DL — SIGNIFICANT CHANGE UP (ref 6–8.3)
RBC # BLD: 3.31 M/UL — LOW (ref 4.2–5.8)
RBC # FLD: 21.1 % — HIGH (ref 10.3–14.5)
RH IG SCN BLD-IMP: POSITIVE — SIGNIFICANT CHANGE UP
SODIUM SERPL-SCNC: 137 MMOL/L — SIGNIFICANT CHANGE UP (ref 135–145)
WBC # BLD: 3.38 K/UL — LOW (ref 3.8–10.5)
WBC # FLD AUTO: 3.38 K/UL — LOW (ref 3.8–10.5)

## 2024-08-01 PROCEDURE — 99233 SBSQ HOSP IP/OBS HIGH 50: CPT | Mod: GC

## 2024-08-01 PROCEDURE — 99232 SBSQ HOSP IP/OBS MODERATE 35: CPT | Mod: GC

## 2024-08-01 PROCEDURE — 95718 EEG PHYS/QHP 2-12 HR W/VEEG: CPT

## 2024-08-01 RX ORDER — POTASSIUM PHOSPHATE, MONOBASIC AND POTASSIUM PHOSPHATE, DIBASIC 224; 236 MG/ML; MG/ML
30 INJECTION, SOLUTION INTRAVENOUS ONCE
Refills: 0 | Status: DISCONTINUED | OUTPATIENT
Start: 2024-08-01 | End: 2024-08-01

## 2024-08-01 RX ORDER — ONDANSETRON HCL/PF 4 MG/2 ML
4 VIAL (ML) INJECTION ONCE
Refills: 0 | Status: COMPLETED | OUTPATIENT
Start: 2024-08-01 | End: 2024-08-01

## 2024-08-01 RX ADMIN — Medication 3.12 MILLIGRAM(S): at 17:43

## 2024-08-01 RX ADMIN — PANTOPRAZOLE SODIUM 40 MILLIGRAM(S): 20 TABLET, DELAYED RELEASE ORAL at 09:46

## 2024-08-01 RX ADMIN — HEPARIN SODIUM 5000 UNIT(S): 1000 INJECTION, SOLUTION INTRAVENOUS; SUBCUTANEOUS at 17:43

## 2024-08-01 RX ADMIN — ATORVASTATIN CALCIUM 40 MILLIGRAM(S): 40 TABLET, FILM COATED ORAL at 21:48

## 2024-08-01 RX ADMIN — MUPIROCIN CALCIUM 1 APPLICATION(S): 20 CREAM TOPICAL at 04:20

## 2024-08-01 RX ADMIN — MUPIROCIN CALCIUM 1 APPLICATION(S): 20 CREAM TOPICAL at 16:44

## 2024-08-01 RX ADMIN — PREDNISOLONE ACETATE 1 DROP(S): 10 SUSPENSION/ DROPS OPHTHALMIC at 04:19

## 2024-08-01 RX ADMIN — PREDNISOLONE ACETATE 1 DROP(S): 10 SUSPENSION/ DROPS OPHTHALMIC at 17:44

## 2024-08-01 RX ADMIN — Medication 40 MILLILITER(S): at 03:30

## 2024-08-01 RX ADMIN — Medication 4 MILLIGRAM(S): at 19:19

## 2024-08-01 RX ADMIN — Medication 81 MILLIGRAM(S): at 09:46

## 2024-08-01 RX ADMIN — HEPARIN SODIUM 5000 UNIT(S): 1000 INJECTION, SOLUTION INTRAVENOUS; SUBCUTANEOUS at 04:19

## 2024-08-01 RX ADMIN — Medication 1 DROP(S): at 21:48

## 2024-08-01 RX ADMIN — CHLORHEXIDINE GLUCONATE 1 APPLICATION(S): 500 CLOTH TOPICAL at 09:45

## 2024-08-01 RX ADMIN — Medication 3.12 MILLIGRAM(S): at 04:19

## 2024-08-01 RX ADMIN — Medication 7.5 MILLIGRAM(S): at 11:22

## 2024-08-01 NOTE — ADVANCED PRACTICE NURSE CONSULT - REASON FOR CONSULT
Patient seen on skin care rounds after wound care referral received for assessment of skin impairment and recommendations of topical management. Patient H/O of  ESRD on HD, functional quadriplegia (R sided BKA + L sided AKA,), blindness, CAD, HTN, HLD, Chronic hypoxemic respiratory failure (2L home O2) who presents today with AMS and minimal responsiveness during HD session.  Pulmonary consulted for pleural effusions, nephrology for HD and Neurology for Code stroke.   Chart reviewed: WBC 3.38, H/H 8.8/28.6, platelets 183, Serum albumin 3.0, A1C 4.8, Iglesia 10.

## 2024-08-01 NOTE — PROGRESS NOTE ADULT - PROBLEM SELECTOR PLAN 2
-CT chest with Similar appearance of a rounded consolidation in the   left lower lobe adjacent to the pleura with associated architectural   distortion of the surrounding bronchovascular bundles. Increase in right  moderate loculated pleural effusion with associated atelectasis of the   right lower lobe.  -f/u pulm recs re thora vs any concern for PNA in; past ones have been transudative but will clarify re consolidative mass.  -f/u sputum cx -CT chest with Similar appearance of a rounded consolidation in the   left lower lobe adjacent to the pleura with associated architectural   distortion of the surrounding bronchovascular bundles. Increase in right  moderate loculated pleural effusion with associated atelectasis of the   right lower lobe.  -f/u pulm recs re thora vs any concern for PNA in; past ones have been transudative consolidative mass stable with no signs of infection of malignancy per pulm  -f/u sputum cx

## 2024-08-01 NOTE — PROGRESS NOTE ADULT - ASSESSMENT
69-year-old male with past medical history of ESRD on hemodialysis MWedFr left AV fistula, HTN, HLD, CAD, CVA, on baseline home O2 2L, dementia a/o times 1-2 baseline who arrives due to minimal responsiveness during hemodialysis, followed by expressive aphasia, CT head w/ multiple old infarcts, more consistent w/ toxic metabolic encephalopathy. Pulmonary consulted for b/l pleural effusions.     CT chest showed increase in moderate right loculated pleural effusion, stable small left pleural effusion; reviewed prior CXR and CT chest - patient w/ chronic effusions fluctuating in size, most likely c/w changes in volume status w/ HD.   Prior thoracentesis 6/2023 c/w transudate   POCUS w/ moderate simple R pleff and small L pleff; no septations, loculations    Recommendations:   - pleural effusions most likely in s/o volume overload  - POCUS as above  - HD for net negative, will continue to monitor respiratory status   - weaned off O2   - abx per primary team   - infectious work-up; pls send sputum sample  - reviewed prior CT chests in comparison: note L side opacity - c/w atelectasis     D/w Dr. Sam and primary team  Pulmonary to sign off

## 2024-08-01 NOTE — PROGRESS NOTE ADULT - SUBJECTIVE AND OBJECTIVE BOX
Dottie Severino PGY1  pager [phone] or check resident tab for coverage    Patient is a 69y old  Male who presents with a chief complaint of expressive aphasia (31 Jul 2024 15:46)      SUBJECTIVE / OVERNIGHT EVENTS:    MEDICATIONS  (STANDING):  aspirin enteric coated 81 milliGRAM(s) Oral daily  atorvastatin 40 milliGRAM(s) Oral at bedtime  carvedilol 3.125 milliGRAM(s) Oral every 12 hours  chlorhexidine 2% Cloths 1 Application(s) Topical daily  dextrose 5%. 1000 milliLiter(s) (100 mL/Hr) IV Continuous <Continuous>  dextrose 5%. 1000 milliLiter(s) (50 mL/Hr) IV Continuous <Continuous>  dextrose 50% Injectable 25 Gram(s) IV Push once  dextrose 50% Injectable 12.5 Gram(s) IV Push once  dextrose 50% Injectable 25 Gram(s) IV Push once  epoetin dinah (EPOGEN) Injectable 36828 Unit(s) IV Push <User Schedule>  glucagon  Injectable 1 milliGRAM(s) IntraMuscular once  heparin   Injectable 5000 Unit(s) SubCutaneous every 12 hours  insulin lispro (ADMELOG) corrective regimen sliding scale   SubCutaneous every 6 hours  latanoprost 0.005% Ophthalmic Solution 1 Drop(s) Both EYES at bedtime  mirtazapine 7.5 milliGRAM(s) Oral daily  mupirocin 2% Ointment 1 Application(s) Both Nostrils two times a day  pantoprazole  Injectable 40 milliGRAM(s) IV Push daily  prednisoLONE acetate 1% Suspension 1 Drop(s) Both EYES two times a day    MEDICATIONS  (PRN):  dextrose Oral Gel 15 Gram(s) Oral once PRN Blood Glucose LESS THAN 70 milliGRAM(s)/deciliter  sodium chloride 0.9% Bolus. 100 milliLiter(s) IV Bolus every 5 minutes PRN SBP LESS THAN or EQUAL to 80 mmHg      CAPILLARY BLOOD GLUCOSE      POCT Blood Glucose.: 110 mg/dL (01 Aug 2024 04:16)  POCT Blood Glucose.: 145 mg/dL (01 Aug 2024 01:50)  POCT Blood Glucose.: 85 mg/dL (31 Jul 2024 21:21)  POCT Blood Glucose.: 90 mg/dL (31 Jul 2024 18:42)  POCT Blood Glucose.: 109 mg/dL (31 Jul 2024 16:55)  POCT Blood Glucose.: 80 mg/dL (31 Jul 2024 15:33)  POCT Blood Glucose.: 60 mg/dL (31 Jul 2024 14:58)  POCT Blood Glucose.: 57 mg/dL (31 Jul 2024 14:57)  POCT Blood Glucose.: 133 mg/dL (31 Jul 2024 10:17)  POCT Blood Glucose.: 58 mg/dL (31 Jul 2024 09:46)  POCT Blood Glucose.: 55 mg/dL (31 Jul 2024 09:44)    I&O's Summary    31 Jul 2024 07:01  -  01 Aug 2024 07:00  --------------------------------------------------------  IN: 750 mL / OUT: 2500 mL / NET: -1750 mL        Vital Signs Last 24 Hrs  T(C): 36.5 (01 Aug 2024 04:05), Max: 36.8 (31 Jul 2024 19:30)  T(F): 97.7 (01 Aug 2024 04:05), Max: 98.3 (31 Jul 2024 19:30)  HR: 73 (01 Aug 2024 04:05) (62 - 97)  BP: 129/56 (01 Aug 2024 04:05) (120/60 - 148/68)  BP(mean): --  RR: 18 (01 Aug 2024 04:05) (14 - 18)  SpO2: 96% (01 Aug 2024 04:05) (95% - 100%)    Parameters below as of 01 Aug 2024 04:05  Patient On (Oxygen Delivery Method): room air        PHYSICAL EXAM:  GENERAL: no distress  PSYCH: A&O x3  HEAD: Atraumatic, Normocephalic  NECK: Supple, No JVD  CHEST/LUNG: clear to auscultation bilaterally  HEART: regular rate and rhythm, no murmurs  ABDOMEN: nontender to palpation, no rebound tenderness/guarding  EXTREMITIES: no edema on bilateral LE  NEUROLOGY: no focal neurologic deficit  SKIN: No rashes or lesions    LABS:                        8.0    3.53  )-----------( 171      ( 31 Jul 2024 07:45 )             26.7      07-31    133<L>  |  88<L>  |  44<H>  ----------------------------<  260<H>  4.5   |  28  |  4.10<H>    Ca    7.9<L>      31 Jul 2024 07:45  Phos  3.6     07-31  Mg     3.30     07-31    TPro  6.9  /  Alb  2.8<L>  /  TBili  0.2  /  DBili  x   /  AST  29  /  ALT  26  /  AlkPhos  100  07-31          Urinalysis Basic - ( 31 Jul 2024 07:45 )    Color: x / Appearance: x / SG: x / pH: x  Gluc: 260 mg/dL / Ketone: x  / Bili: x / Urobili: x   Blood: x / Protein: x / Nitrite: x   Leuk Esterase: x / RBC: x / WBC x   Sq Epi: x / Non Sq Epi: x / Bacteria: x        RADIOLOGY & ADDITIONAL TESTS:    Imaging Personally Reviewed:    Consultant(s) Notes Reviewed:      Care Discussed with Consultants/Other Providers:   Dottie Severino PGY3  pager [phone] or check resident tab for coverage    Patient is a 69y old  Male who presents with a chief complaint of expressive aphasia (31 Jul 2024 15:46)      SUBJECTIVE / OVERNIGHT EVENTS: Patient more alert and interactive this AM. Denies cp/sob. Endorses back pain. Denies n/v/d/c. Denies fevers/chills. Denies being hungry.    MEDICATIONS  (STANDING):  aspirin enteric coated 81 milliGRAM(s) Oral daily  atorvastatin 40 milliGRAM(s) Oral at bedtime  carvedilol 3.125 milliGRAM(s) Oral every 12 hours  chlorhexidine 2% Cloths 1 Application(s) Topical daily  dextrose 5%. 1000 milliLiter(s) (100 mL/Hr) IV Continuous <Continuous>  dextrose 5%. 1000 milliLiter(s) (50 mL/Hr) IV Continuous <Continuous>  dextrose 50% Injectable 25 Gram(s) IV Push once  dextrose 50% Injectable 12.5 Gram(s) IV Push once  dextrose 50% Injectable 25 Gram(s) IV Push once  epoetin dinah (EPOGEN) Injectable 77280 Unit(s) IV Push <User Schedule>  glucagon  Injectable 1 milliGRAM(s) IntraMuscular once  heparin   Injectable 5000 Unit(s) SubCutaneous every 12 hours  insulin lispro (ADMELOG) corrective regimen sliding scale   SubCutaneous every 6 hours  latanoprost 0.005% Ophthalmic Solution 1 Drop(s) Both EYES at bedtime  mirtazapine 7.5 milliGRAM(s) Oral daily  mupirocin 2% Ointment 1 Application(s) Both Nostrils two times a day  pantoprazole  Injectable 40 milliGRAM(s) IV Push daily  prednisoLONE acetate 1% Suspension 1 Drop(s) Both EYES two times a day    MEDICATIONS  (PRN):  dextrose Oral Gel 15 Gram(s) Oral once PRN Blood Glucose LESS THAN 70 milliGRAM(s)/deciliter  sodium chloride 0.9% Bolus. 100 milliLiter(s) IV Bolus every 5 minutes PRN SBP LESS THAN or EQUAL to 80 mmHg      CAPILLARY BLOOD GLUCOSE      POCT Blood Glucose.: 110 mg/dL (01 Aug 2024 04:16)  POCT Blood Glucose.: 145 mg/dL (01 Aug 2024 01:50)  POCT Blood Glucose.: 85 mg/dL (31 Jul 2024 21:21)  POCT Blood Glucose.: 90 mg/dL (31 Jul 2024 18:42)  POCT Blood Glucose.: 109 mg/dL (31 Jul 2024 16:55)  POCT Blood Glucose.: 80 mg/dL (31 Jul 2024 15:33)  POCT Blood Glucose.: 60 mg/dL (31 Jul 2024 14:58)  POCT Blood Glucose.: 57 mg/dL (31 Jul 2024 14:57)  POCT Blood Glucose.: 133 mg/dL (31 Jul 2024 10:17)  POCT Blood Glucose.: 58 mg/dL (31 Jul 2024 09:46)  POCT Blood Glucose.: 55 mg/dL (31 Jul 2024 09:44)    I&O's Summary    31 Jul 2024 07:01  -  01 Aug 2024 07:00  --------------------------------------------------------  IN: 750 mL / OUT: 2500 mL / NET: -1750 mL        Vital Signs Last 24 Hrs  T(C): 36.5 (01 Aug 2024 04:05), Max: 36.8 (31 Jul 2024 19:30)  T(F): 97.7 (01 Aug 2024 04:05), Max: 98.3 (31 Jul 2024 19:30)  HR: 73 (01 Aug 2024 04:05) (62 - 97)  BP: 129/56 (01 Aug 2024 04:05) (120/60 - 148/68)  BP(mean): --  RR: 18 (01 Aug 2024 04:05) (14 - 18)  SpO2: 96% (01 Aug 2024 04:05) (95% - 100%)    Parameters below as of 01 Aug 2024 04:05  Patient On (Oxygen Delivery Method): room air        PHYSICAL EXAM:  GENERAL: no distress  PSYCH: A&O x3  HEAD: Atraumatic, Normocephalic  NECK: Supple, No JVD  CHEST/LUNG: clear to auscultation bilaterally  HEART: regular rate and rhythm, no murmurs  ABDOMEN: nontender to palpation, no rebound tenderness/guarding  EXTREMITIES: no edema on bilateral LE  NEUROLOGY: no focal neurologic deficit  SKIN: No rashes or lesions    LABS:                        8.0    3.53  )-----------( 171      ( 31 Jul 2024 07:45 )             26.7      07-31    133<L>  |  88<L>  |  44<H>  ----------------------------<  260<H>  4.5   |  28  |  4.10<H>    Ca    7.9<L>      31 Jul 2024 07:45  Phos  3.6     07-31  Mg     3.30     07-31    TPro  6.9  /  Alb  2.8<L>  /  TBili  0.2  /  DBili  x   /  AST  29  /  ALT  26  /  AlkPhos  100  07-31          Urinalysis Basic - ( 31 Jul 2024 07:45 )    Color: x / Appearance: x / SG: x / pH: x  Gluc: 260 mg/dL / Ketone: x  / Bili: x / Urobili: x   Blood: x / Protein: x / Nitrite: x   Leuk Esterase: x / RBC: x / WBC x   Sq Epi: x / Non Sq Epi: x / Bacteria: x        RADIOLOGY & ADDITIONAL TESTS:    Imaging Personally Reviewed:    Consultant(s) Notes Reviewed:      Care Discussed with Consultants/Other Providers:

## 2024-08-01 NOTE — ADVANCED PRACTICE NURSE CONSULT - ASSESSMENT
General: A&Ox3-4, bedbound, BLE amputations with intact surgical scars, incontinent of stool, on HD. EEG in place. Skin warm, dry with increased moisture in intertriginous folds, naturally darkened pigmentation,  scattered areas of hyperpigmentation and hypopigmentation.     Sacral/gluteal fold extending to bilateral inner buttocks with Incontinence/moisture associated dermatitis with suspected candidiasis presenting with darkened hyperpigmentation and dry flaky skin at the periphery. Irregular borders. Unable to be full seen in natural anatomical position.

## 2024-08-01 NOTE — DIETITIAN INITIAL EVALUATION ADULT - OTHER INFO
69-year-old male with past medical history of ESRD on hemodialysis MWedFr left AV fistula, HTN, HLD, CAD, CVA, dementia a/o times 1-2 baseline who arrives due to minimal responsiveness during hemodialysis, per chart.     Patient is A&O x1, unable to conduct nutrition interview during visit. Per tray observation during visit, patient consumed <50% of lunch. Noted one intake of 51-75% documented on RN flow sheet. Per swallow eval dated 7/31, recommended minced and moist, mildly thick liquid diet consistency, aligns with current diet order. No recent episodes of any nausea, vomiting, diarrhea, constipation reported at this time. No bowel movement documented on RN flow sheet. Monitor bowel movement and consider adding bowel regimen PRN. No weight obtained during this admission, RD obtained weight via bed scale during visit- 49kg (8/1). Per Francisca LENNON, height 167.6cm. Height indicated in chart 121.9cm, possibly not accurate. Per RD note dated 7/5, weight history 48.2kg (7/5), 51.3kg (10/7/23). Noted patient has weight loss of -2.3kg/-4.5%BW x 10months. Continue to monitor weight trend. 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 Adjusted IBW-107.4lbs- 131.2lbs. Labs reviewed: HgA1c- 4.8% (5/2, WNL), consider obtaining update HgA1c, POCT- 89-145mg/dL (within acceptable limits in acute care setting). Consider d/c consistent carb diet restriction. Patient has no known food allergies, per chart.

## 2024-08-01 NOTE — DIETITIAN INITIAL EVALUATION ADULT - PERTINENT LABORATORY DATA
08-01    137  |  98  |  22  ----------------------------<  76  4.0   |  27  |  2.98<H>    Ca    8.3<L>      01 Aug 2024 06:20  Phos  2.2     08-01  Mg     2.80     08-01    TPro  7.3  /  Alb  3.0<L>  /  TBili  0.3  /  DBili  x   /  AST  24  /  ALT  26  /  AlkPhos  103  08-01  POCT Blood Glucose.: 91 mg/dL (08-01-24 @ 12:41)  A1C with Estimated Average Glucose Result: 4.8 % (05-02-24 @ 06:15)  A1C with Estimated Average Glucose Result: 4.7 % (10-07-23 @ 09:30)

## 2024-08-01 NOTE — PROGRESS NOTE ADULT - SUBJECTIVE AND OBJECTIVE BOX
Neurology Progress Note    S: Patient seen and examined.     Medications: MEDICATIONS  (STANDING):  aspirin enteric coated 81 milliGRAM(s) Oral daily  atorvastatin 40 milliGRAM(s) Oral at bedtime  carvedilol 3.125 milliGRAM(s) Oral every 12 hours  chlorhexidine 2% Cloths 1 Application(s) Topical daily  dextrose 5%. 1000 milliLiter(s) (50 mL/Hr) IV Continuous <Continuous>  dextrose 5%. 1000 milliLiter(s) (100 mL/Hr) IV Continuous <Continuous>  dextrose 50% Injectable 12.5 Gram(s) IV Push once  dextrose 50% Injectable 25 Gram(s) IV Push once  dextrose 50% Injectable 25 Gram(s) IV Push once  epoetin dinah (EPOGEN) Injectable 95523 Unit(s) IV Push <User Schedule>  glucagon  Injectable 1 milliGRAM(s) IntraMuscular once  heparin   Injectable 5000 Unit(s) SubCutaneous every 12 hours  insulin lispro (ADMELOG) corrective regimen sliding scale   SubCutaneous every 6 hours  latanoprost 0.005% Ophthalmic Solution 1 Drop(s) Both EYES at bedtime  mirtazapine 7.5 milliGRAM(s) Oral daily  mupirocin 2% Ointment 1 Application(s) Both Nostrils two times a day  pantoprazole  Injectable 40 milliGRAM(s) IV Push daily  prednisoLONE acetate 1% Suspension 1 Drop(s) Both EYES two times a day    MEDICATIONS  (PRN):  dextrose Oral Gel 15 Gram(s) Oral once PRN Blood Glucose LESS THAN 70 milliGRAM(s)/deciliter  sodium chloride 0.9% Bolus. 100 milliLiter(s) IV Bolus every 5 minutes PRN SBP LESS THAN or EQUAL to 80 mmHg       Vitals:  Vital Signs Last 24 Hrs  T(C): 36.7 (01 Aug 2024 12:06), Max: 36.8 (31 Jul 2024 19:30)  T(F): 98.1 (01 Aug 2024 12:06), Max: 98.3 (31 Jul 2024 19:30)  HR: 71 (01 Aug 2024 12:06) (69 - 97)  BP: 144/61 (01 Aug 2024 12:06) (128/62 - 148/68)  BP(mean): --  RR: 18 (01 Aug 2024 12:06) (16 - 18)  SpO2: 94% (01 Aug 2024 12:06) (94% - 100%)    Parameters below as of 01 Aug 2024 04:05  Patient On (Oxygen Delivery Method): room air          General:  Constitutional: Male, appears stated age, in no apparent distress including pain  Head: Normocephalic & Atraumatic.  Respiratory: Breathing comfortably.    Neurological:  MS: Eyes closed, open to noxious stimuli, alert AAox2  Language: Speech is clearer today, still with mild dysarthria     CNs: PERRL (R = 3mm, L = 3mm). No blink to threat b/l; patient able to make out shapes, but not objects or count fingers. Dysconjugate gaze w/ exodeviation. No facial asymmetry initially, on reassessment smile is symmetric. Moderate to severe dysarthria.     Motor: Normal muscle bulk & tone. Bilateral UE drifts, R>L, however there appears to be intermittent loss of tone consistent with asterixis. 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     Sensation: Grimaces symmetrically to noxious stimuli b/l.     Coordination: unable to assess due to visual acuity baseline.     Gait: Patient unable to assess at baseline.       I personally reviewed the below data/images/labs:      CBC Full  -  ( 31 Jul 2024 07:45 )  WBC Count : 3.53 K/uL  RBC Count : 2.99 M/uL  Hemoglobin : 8.0 g/dL  Hematocrit : 26.7 %  Platelet Count - Automated : 171 K/uL  Mean Cell Volume : 89.3 fL  Mean Cell Hemoglobin : 26.8 pg  Mean Cell Hemoglobin Concentration : 30.0 gm/dL  Auto Neutrophil # : x  Auto Lymphocyte # : x  Auto Monocyte # : x  Auto Eosinophil # : x  Auto Basophil # : x  Auto Neutrophil % : x  Auto Lymphocyte % : x  Auto Monocyte % : x  Auto Eosinophil % : x  Auto Basophil % : x  n  07-31    133<L>  |  88<L>  |  44<H>  ----------------------------<  260<H>  4.5   |  28  |  4.10<H>    Ca    7.9<L>      31 Jul 2024 07:45  Phos  3.6     07-31  Mg     3.30     07-31    TPro  6.9  /  Alb  2.8<L>  /  TBili  0.2  /  DBili  x   /  AST  29  /  ALT  26  /  AlkPhos  100  07-31    LIVER FUNCTIONS - ( 31 Jul 2024 06:05 )  Alb: 2.8 g/dL / Pro: 6.9 g/dL / ALK PHOS: 100 U/L / ALT: 26 U/L / AST: 29 U/L / GGT: x           PT/INR - ( 29 Jul 2024 14:30 )   PT: 12.2 sec;   INR: 1.08 ratio         PTT - ( 29 Jul 2024 14:30 )  PTT:31.9 sec  Urinalysis Basic - ( 31 Jul 2024 07:45 )    Color: x / Appearance: x / SG: x / pH: x  Gluc: 260 mg/dL / Ketone: x  / Bili: x / Urobili: x   Blood: x / Protein: x / Nitrite: x   Leuk Esterase: x / RBC: x / WBC x   Sq Epi: x / Non Sq Epi: x / Bacteria: x      CTH/CTA/CTP:    There are chronic infarcts in both PCA territories and in the cerebellum   bilaterally, unchanged. There is also a chronic infarct at the left   superior frontal gyrus. There are small chronic white matter infarcts in   the right corona radiata and centrum semiovale, better seen on theprior   MRI.    A hyperdense mass is noted at the left CPA. There are dense   calcifications at the interhemispheric fissure and in the left middle   frontal gyrus. There are extensive vascular calcifications within the ECA   branches in the soft tissues of the scalp. Accounting for differences in   technique, all of these findings appear unchanged.    Moderate generalized cerebral volume loss, with distention of the sulci   and concomitant ex-vacuo ventricular dilatation. Moderate to severe   nonspecific low attenuation in the periventricular and subcortical white   matter, likely due to small vessel disease.    No acute intracranial hemorrhage. No midline shift or herniation.    The visualized sinuses and mastoids are clear. Left lens implant. Limited   views of the orbits and visualized soft tissues of the neck, face, scalp,   skull base, and calvarium are otherwise unremarkable.    Using a threshold of 30%, there is an rCBF defect in the right PCA   territory measuring 5 mL, corresponding to a chronic infarct on the CT.   Using a threshold of 6s, there is a matching Tmax abnormality in the   right PCA territory measuring 5 mL. No evidence of a perfusion mismatch   to suggest brain tissue at risk, although MRI would be more sensitive for   acute ischemia.    There is calcified atheromatous plaque at the great vessel origins, with   mild narrowing less than 10%. The left vertebral artery arises directly   from the arch and is congenitally hypoplastic. There is calcified plaque   at the origin of the dominant right vertebral artery and at the right   V2/V3 segment, with mild narrowing.    The carotid bifurcations are not well seen due to patient   motion/swallowing artifact. On the right, there is heavily calcified   plaque with 40-50% stenosis of the proximal right ICA. On the left, there   is calcified plaque at the carotid bifurcation and ICA origin with less   than 10% narrowing.    Otherwise, the right and left ICAs, CCAs, vertebrals, subclavians, and   the brachiocephalic artery are negative. No ulcerated plaque or arterial   dissection.    Intracranially, there is calcified plaque with moderate right and   moderate to severe left V4 vertebral segment stenoses. The left vertebral   artery probably ends in a PICA, without joining the basilar. The basilar   artery is diffusely hypoplastic and at least moderately stenosed.   Correlate clinically for vertebrobasilar insufficiency.    There is heavily calcified plaque with mild to moderate narrowing of the   C4-C6 ICA segments bilaterally. There is fetal origin of the right PCA.   There are mild to moderate stenotic lesions in the M1 segment of the   right MCA and in the PCOM segment of the fetal right PCA. There is   heavily calcified plaque with moderate tosevere narrowing in both A2   segments distally. There is adequate runoff, so this could be   artifactually accentuated.    No intracranial aneurysms or large vessel occlusions. No large feeding   arteries or draining veins. The ACAs, MCAs, PCAs, andcarotid siphons are   otherwise negative. The basilar artery and V4 vertebral segments are   otherwise negative. Abnormalities of  vessels and distal   intracranial branches are beyond the resolution of this technique, and   catheter angiography would be more sensitive.    The dural venous sinuses are grossly patent, although this examination   wasn't optimized to evaluate the cerebral veins. Mild degenerative   changes are noted in the cervical spine. No gross evidence of an acute   fracture, although this examination wasn't optimized to evaluate the   spine. There are bilateral pleural effusions, with passive atelectasis.   There is septal thickening at the lung apices.    IMPRESSION:    1.  No acute intracranial hemorrhage or acute retrievable clot.  2.  MRI would be more sensitive for acute ischemia.  3.  Stable chronic infarcts, senescent cerebral changes, and hyperdense   mass at the left CPA.  4.  Proximal right ICA stenosis, 40-50%.  5.  Multifocal stenotic lesions in the basilar artery and both V4   vertebral segments.  6.  Correlate clinically for vertebrobasilar insufficiency.  7.  Heavily calcified chronic plaque with moderate to severe bilateral   DENISE stenoses.        MRI brain w/w/o contrast (Epilepsy protocol) 7/2024:    1. Unchanged left-sided CP angle vestibular schwannoma.    2. Multiple unchanged chronic intracranial findings, as discussed.    3. Similar-appearing extensive chronic white matter microvascular type   changes.    4. No evidence of acute intracranial hemorrhage or acute cerebral   ischemia.    EEG x 24 hours 7/2024:    Mild bilateral temporal focal cerebral dysfunction can be structural or functional in etiology.  No epileptiform abnormalities or seizures.      EEG Classification / Summary:  Abnormal  EEG in the awake state  1. Mild generalized background slowing, of nonspecific etiology    Clinical Impression:  Mild diffuse cerebral dysfunction, which can indicate an encephalopathic process, including but not limited to toxic and metabolic.   There were no epileptiform abnormalities recorded.

## 2024-08-01 NOTE — PROGRESS NOTE ADULT - SUBJECTIVE AND OBJECTIVE BOX
[facility] [place] [facility]   MD MARIBELL CARRION MD ANGELA WONG, PA    TEL:  OFFICE: [phone]  From 5pm-7am Answering Service [phone]    -- RENAL FOLLOW UP NOTE ---Date of Service 08-01-24 @ 09:38    Patient is a 69y old  Male who presents with a chief complaint of expressive aphasia (01 Aug 2024 08:03)      Patient seen and examined at bedside. No chest pain/sob    VITALS:  T(F): 97.7 (08-01-24 @ 04:05), Max: 98.3 (07-31-24 @ 19:30)  HR: 73 (08-01-24 @ 04:05)  BP: 129/56 (08-01-24 @ 04:05)  RR: 18 (08-01-24 @ 04:05)  SpO2: 96% (08-01-24 @ 04:05)  Wt(kg): --    07-31 @ 07:01  -  08-01 @ 07:00  --------------------------------------------------------  IN: 750 mL / OUT: 2500 mL / NET: -1750 mL          PHYSICAL EXAM:  General: NAD  Neck: No JVD  Respiratory: CTAB, no wheezes, rales or rhonchi  Cardiovascular: S1, S2, RRR  Gastrointestinal: BS+, soft, NT/ND  Extremities: b/l amputation    Hospital Medications:   MEDICATIONS  (STANDING):  aspirin enteric coated 81 milliGRAM(s) Oral daily  atorvastatin 40 milliGRAM(s) Oral at bedtime  carvedilol 3.125 milliGRAM(s) Oral every 12 hours  chlorhexidine 2% Cloths 1 Application(s) Topical daily  dextrose 5%. 1000 milliLiter(s) (50 mL/Hr) IV Continuous <Continuous>  dextrose 5%. 1000 milliLiter(s) (100 mL/Hr) IV Continuous <Continuous>  dextrose 50% Injectable 12.5 Gram(s) IV Push once  dextrose 50% Injectable 25 Gram(s) IV Push once  dextrose 50% Injectable 25 Gram(s) IV Push once  epoetin dinah (EPOGEN) Injectable 42279 Unit(s) IV Push <User Schedule>  glucagon  Injectable 1 milliGRAM(s) IntraMuscular once  heparin   Injectable 5000 Unit(s) SubCutaneous every 12 hours  insulin lispro (ADMELOG) corrective regimen sliding scale   SubCutaneous every 6 hours  latanoprost 0.005% Ophthalmic Solution 1 Drop(s) Both EYES at bedtime  mirtazapine 7.5 milliGRAM(s) Oral daily  mupirocin 2% Ointment 1 Application(s) Both Nostrils two times a day  pantoprazole  Injectable 40 milliGRAM(s) IV Push daily  potassium phosphate IVPB 30 milliMole(s) IV Intermittent once  prednisoLONE acetate 1% Suspension 1 Drop(s) Both EYES two times a day      LABS:  08-01    137  |  98  |  22  ----------------------------<  76  4.0   |  27  |  2.98<H>    Ca    8.3<L>      01 Aug 2024 06:20  Phos  2.2     08-01  Mg     2.80     08-01    TPro  7.3  /  Alb  3.0<L>  /  TBili  0.3  /  DBili      /  AST  24  /  ALT  26  /  AlkPhos  103  08-01    Creatinine Trend: 2.98 <--, 4.10 <--, 3.94 <--, 3.21 <--, 2.42 <--, 4.93 <--    Phosphorus: 2.2 mg/dL (08-01 @ 06:20)  Albumin: 3.0 g/dL (08-01 @ 06:20)                              8.8    3.38  )-----------( 183      ( 01 Aug 2024 06:20 )             28.6     Urine Studies:  Urinalysis - [08-01-24 @ 06:20]      Color  / Appearance  / SG  / pH       Gluc 76 / Ketone   / Bili  / Urobili        Blood  / Protein  / Leuk Est  / Nitrite       RBC  / WBC  / Hyaline  / Gran  / Sq Epi  / Non Sq Epi  / Bacteria       Iron 42, TIBC 127, %sat 33      [05-11-24 @ 07:26]  Ferritin 1680      [05-11-24 @ 07:26]  PTH -- (Ca --)      [04-28-24 @ 05:00]   155  TSH 1.45      [07-31-24 @ 07:45]  Lipid: chol 119, TG 66, HDL 46, LDL --      [10-07-23 @ 09:30]    HBsAb 55.8      [07-03-24 @ 23:30]  HBsAg Nonreact      [07-03-24 @ 23:30]  HBcAb Nonreact      [07-03-24 @ 23:30]  HCV 0.11, Nonreact      [07-03-24 @ 23:30]      RADIOLOGY & ADDITIONAL STUDIES:

## 2024-08-01 NOTE — DIETITIAN INITIAL EVALUATION ADULT - DIET TYPE
minced and moist/renal replacement pts:no protein restr,no conc K & phos, low sodium/mildly thick liquids

## 2024-08-01 NOTE — PROGRESS NOTE ADULT - SUBJECTIVE AND OBJECTIVE BOX
PULMONARY PROGRESS NOTE    PATIENT INFORMATION:  NAME: JAMES PATRICK:  MRN: [id number]    CHIEF COMPLAINT: Patient is a 69y old  Male who presents with a chief complaint of expressive aphasia (01 Aug 2024 07:32)      [x] INITIAL CONSULT, H&P, FAMILY HISTORY and PAST MEDICAL AND SURGICAL HISTORY REVIEWED    OVERNIGHT EVENTS, CHANGES TO HPI, SUBJECTIVE:   - Patient seen and examined at bedside  - No overnight events  - Symptoms stable/improving    ========================REVIEW OF SYSTEMS========================  [x] ROS negative except as per HPI    ========================MEDICATIONS=============================  NEURO  mirtazapine 7.5 milliGRAM(s) Oral daily    CARDIO  carvedilol 3.125 milliGRAM(s) Oral every 12 hours    PULM    FEN/GI  dextrose 5%. 1000 milliLiter(s) IV Continuous <Continuous>  dextrose 5%. 1000 milliLiter(s) IV Continuous <Continuous>  pantoprazole  Injectable 40 milliGRAM(s) IV Push daily    HEME/ONC  aspirin enteric coated 81 milliGRAM(s) Oral daily  heparin   Injectable 5000 Unit(s) SubCutaneous every 12 hours    ANTIMICROBIALS    ENDO  atorvastatin 40 milliGRAM(s) Oral at bedtime  dextrose 50% Injectable 12.5 Gram(s) IV Push once  dextrose 50% Injectable 25 Gram(s) IV Push once  dextrose 50% Injectable 25 Gram(s) IV Push once  glucagon  Injectable 1 milliGRAM(s) IntraMuscular once  insulin lispro (ADMELOG) corrective regimen sliding scale   SubCutaneous every 6 hours    OTHER  chlorhexidine 2% Cloths 1 Application(s) Topical daily  epoetin dinah (EPOGEN) Injectable 70683 Unit(s) IV Push <User Schedule>  latanoprost 0.005% Ophthalmic Solution 1 Drop(s) Both EYES at bedtime  mupirocin 2% Ointment 1 Application(s) Both Nostrils two times a day  prednisoLONE acetate 1% Suspension 1 Drop(s) Both EYES two times a day      PRN      Drips      ========================PHYSICAL EXAM============================    VITALS: Vital Signs Last 24 Hrs  T(C): 36.5 (01 Aug 2024 04:05), Max: 36.8 (31 Jul 2024 19:30)  T(F): 97.7 (01 Aug 2024 04:05), Max: 98.3 (31 Jul 2024 19:30)  HR: 73 (01 Aug 2024 04:05) (62 - 97)  BP: 129/56 (01 Aug 2024 04:05) (120/60 - 148/68)  BP(mean): --  RR: 18 (01 Aug 2024 04:05) (14 - 18)  SpO2: 96% (01 Aug 2024 04:05) (95% - 100%)    Parameters below as of 01 Aug 2024 04:05  Patient On (Oxygen Delivery Method): room air        INTAKE and OUTPUT: I&O's Detail    31 Jul 2024 07:01  -  01 Aug 2024 07:00  --------------------------------------------------------  IN:    Oral Fluid: 250 mL    Other (mL): 500 mL  Total IN: 750 mL    OUT:    Other (mL): 2500 mL  Total OUT: 2500 mL    Total NET: -1750 mL          VENTILATOR SETTINGS:     Physical Exam  GENERAL: NAD, well-developed  NECK: Supple, No JVD  CHEST/LUNG: Clear to auscultation bilaterally; No wheeze  HEART: Regular rate and rhythm; No murmurs, rubs, or gallops  EXTREMITIES:  BKA/AKA, 2+ radial pulses, No clubbing, cyanosis, or edema  NEUROLOGY: arousable to voice and tactile stimuli, not answering questions  SKIN: No rashes or lesions        ======================LABORATORY RESULTS AND IMAGING=============                        8.0    3.53  )-----------( 171      ( 31 Jul 2024 07:45 )             26.7                                  07-31    133<L>  |  88<L>  |  44<H>  ----------------------------<  260<H>  4.5   |  28  |  4.10<H>    Ca    7.9<L>      31 Jul 2024 07:45  Phos  3.6     07-31  Mg     3.30     07-31    TPro  6.9  /  Alb  2.8<L>  /  TBili  0.2  /  DBili  x   /  AST  29  /  ALT  26  /  AlkPhos  100  07-31    N:1.75/L:0.33= __ 07-30-24 @ 06:37  N:4.36/L:0.36= __ 07-29-24 @ 14:30  N:2.32/L:0.84= __ 07-09-24 @ 06:48  N:3.46/L:0.47= __ 07-04-24 @ 01:18  N:4.65/L:0.76= __ 07-03-24 @ 18:49    Ref-range: <3 normal, >9 elevated, >18 very elevated    Procalcitonin: 0.41 ng/mL (07-29-24 @ 18:52)        ABG Trend  06-23-23 @ 08:37 OoC121.0  - 7.43/49/62/32/91.5 Lactate --  03-29-18 @ 13:17 FiO2--  - 7.42/40/210/26/99.3 Lactate --  03-29-18 @ 11:16 FiO2--  - 7.43/40/167/26/99.1 Lactate --  03-29-18 @ 09:51 FiO2--  - 7.49/33/370/27/99.8 Lactate --    VBG Trend  07-29-24 @ 14:30 FiO2--  - 7.41/57/47/36/80.9 Lactate 1.8  07-03-24 @ 18:30 FiO2--  - 7.45/55/56/38/90.1 Lactate 1.8  05-02-24 @ 06:17 FiO2--  - 7.42/50/52/32/84.5 Lactate 1.6  04-24-24 @ 22:31 FiO2--  - 7.40/62/29/38/43.8 Lactate 2.2  08-25-23 @ 19:58 FiO2--  - 7.48/49/76/36/96.2 Lactate 1.6      Creatinine Trend: 4.10<--, 3.94<--, 3.21<--, 2.42<--, 4.93<--, 4.00<--    [x] RADIOLOGY REVIEWED AND INTERPRETED BY ME     PULMONARY PROGRESS NOTE    PATIENT INFORMATION:  NAME: JAMES PATRICK:  MRN: [id number]    CHIEF COMPLAINT: Patient is a 69y old  Male who presents with a chief complaint of expressive aphasia (01 Aug 2024 07:32)      [x] INITIAL CONSULT, H&P, FAMILY HISTORY and PAST MEDICAL AND SURGICAL HISTORY REVIEWED    OVERNIGHT EVENTS, CHANGES TO HPI, SUBJECTIVE:   - Patient seen and examined at bedside  - No overnight events  - Symptoms stable/improving  Patient now off O2    ========================REVIEW OF SYSTEMS========================  [x] ROS negative except as per HPI    ========================MEDICATIONS=============================  NEURO  mirtazapine 7.5 milliGRAM(s) Oral daily    CARDIO  carvedilol 3.125 milliGRAM(s) Oral every 12 hours    PULM    FEN/GI  dextrose 5%. 1000 milliLiter(s) IV Continuous <Continuous>  dextrose 5%. 1000 milliLiter(s) IV Continuous <Continuous>  pantoprazole  Injectable 40 milliGRAM(s) IV Push daily    HEME/ONC  aspirin enteric coated 81 milliGRAM(s) Oral daily  heparin   Injectable 5000 Unit(s) SubCutaneous every 12 hours    ANTIMICROBIALS    ENDO  atorvastatin 40 milliGRAM(s) Oral at bedtime  dextrose 50% Injectable 12.5 Gram(s) IV Push once  dextrose 50% Injectable 25 Gram(s) IV Push once  dextrose 50% Injectable 25 Gram(s) IV Push once  glucagon  Injectable 1 milliGRAM(s) IntraMuscular once  insulin lispro (ADMELOG) corrective regimen sliding scale   SubCutaneous every 6 hours    OTHER  chlorhexidine 2% Cloths 1 Application(s) Topical daily  epoetin dinah (EPOGEN) Injectable 43701 Unit(s) IV Push <User Schedule>  latanoprost 0.005% Ophthalmic Solution 1 Drop(s) Both EYES at bedtime  mupirocin 2% Ointment 1 Application(s) Both Nostrils two times a day  prednisoLONE acetate 1% Suspension 1 Drop(s) Both EYES two times a day      PRN      Drips      ========================PHYSICAL EXAM============================    VITALS: Vital Signs Last 24 Hrs  T(C): 36.5 (01 Aug 2024 04:05), Max: 36.8 (31 Jul 2024 19:30)  T(F): 97.7 (01 Aug 2024 04:05), Max: 98.3 (31 Jul 2024 19:30)  HR: 73 (01 Aug 2024 04:05) (62 - 97)  BP: 129/56 (01 Aug 2024 04:05) (120/60 - 148/68)  BP(mean): --  RR: 18 (01 Aug 2024 04:05) (14 - 18)  SpO2: 96% (01 Aug 2024 04:05) (95% - 100%)    Parameters below as of 01 Aug 2024 04:05  Patient On (Oxygen Delivery Method): room air        INTAKE and OUTPUT: I&O's Detail    31 Jul 2024 07:01  -  01 Aug 2024 07:00  --------------------------------------------------------  IN:    Oral Fluid: 250 mL    Other (mL): 500 mL  Total IN: 750 mL    OUT:    Other (mL): 2500 mL  Total OUT: 2500 mL    Total NET: -1750 mL          VENTILATOR SETTINGS:     Physical Exam  GENERAL: NAD, well-developed  NECK: Supple, No JVD  CHEST/LUNG: Clear to auscultation bilaterally; No wheeze  HEART: Regular rate and rhythm; No murmurs, rubs, or gallops  EXTREMITIES:  BKA/AKA, 2+ radial pulses, No clubbing, cyanosis, or edema  NEUROLOGY: AOx2, answering simple questions  SKIN: No rashes or lesions        ======================LABORATORY RESULTS AND IMAGING=============                        8.0    3.53  )-----------( 171      ( 31 Jul 2024 07:45 )             26.7                                  07-31    133<L>  |  88<L>  |  44<H>  ----------------------------<  260<H>  4.5   |  28  |  4.10<H>    Ca    7.9<L>      31 Jul 2024 07:45  Phos  3.6     07-31  Mg     3.30     07-31    TPro  6.9  /  Alb  2.8<L>  /  TBili  0.2  /  DBili  x   /  AST  29  /  ALT  26  /  AlkPhos  100  07-31    N:1.75/L:0.33= __ 07-30-24 @ 06:37  N:4.36/L:0.36= __ 07-29-24 @ 14:30  N:2.32/L:0.84= __ 07-09-24 @ 06:48  N:3.46/L:0.47= __ 07-04-24 @ 01:18  N:4.65/L:0.76= __ 07-03-24 @ 18:49    Ref-range: <3 normal, >9 elevated, >18 very elevated    Procalcitonin: 0.41 ng/mL (07-29-24 @ 18:52)        ABG Trend  06-23-23 @ 08:37 FnT092.0  - 7.43/49/62/32/91.5 Lactate --  03-29-18 @ 13:17 FiO2--  - 7.42/40/210/26/99.3 Lactate --  03-29-18 @ 11:16 FiO2--  - 7.43/40/167/26/99.1 Lactate --  03-29-18 @ 09:51 FiO2--  - 7.49/33/370/27/99.8 Lactate --    VBG Trend  07-29-24 @ 14:30 FiO2--  - 7.41/57/47/36/80.9 Lactate 1.8  07-03-24 @ 18:30 FiO2--  - 7.45/55/56/38/90.1 Lactate 1.8  05-02-24 @ 06:17 FiO2--  - 7.42/50/52/32/84.5 Lactate 1.6  04-24-24 @ 22:31 FiO2--  - 7.40/62/29/38/43.8 Lactate 2.2  08-25-23 @ 19:58 FiO2--  - 7.48/49/76/36/96.2 Lactate 1.6      Creatinine Trend: 4.10<--, 3.94<--, 3.21<--, 2.42<--, 4.93<--, 4.00<--    [x] RADIOLOGY REVIEWED AND INTERPRETED BY ME

## 2024-08-01 NOTE — EEG REPORT - NS EEG TEXT BOX
JAMSE PATRICK [id number]     Study Date:  		08-01-24  Duration x Hours: 4 hours 15 minutes  --------------------------------------------------------------------------------------------------  History:  CC/ HPI Patient is a 69y old  Male who presents with a chief complaint of Altered mental status     (01 Aug 2024 13:00)    MEDICATIONS  (STANDING):  aspirin enteric coated 81 milliGRAM(s) Oral daily  atorvastatin 40 milliGRAM(s) Oral at bedtime  carvedilol 3.125 milliGRAM(s) Oral every 12 hours  chlorhexidine 2% Cloths 1 Application(s) Topical daily  dextrose 5%. 1000 milliLiter(s) (100 mL/Hr) IV Continuous <Continuous>  dextrose 5%. 1000 milliLiter(s) (50 mL/Hr) IV Continuous <Continuous>  dextrose 50% Injectable 25 Gram(s) IV Push once  dextrose 50% Injectable 12.5 Gram(s) IV Push once  dextrose 50% Injectable 25 Gram(s) IV Push once  epoetin dinah (EPOGEN) Injectable 85176 Unit(s) IV Push <User Schedule>  glucagon  Injectable 1 milliGRAM(s) IntraMuscular once  heparin   Injectable 5000 Unit(s) SubCutaneous every 12 hours  insulin lispro (ADMELOG) corrective regimen sliding scale   SubCutaneous every 6 hours  latanoprost 0.005% Ophthalmic Solution 1 Drop(s) Both EYES at bedtime  mirtazapine 7.5 milliGRAM(s) Oral daily  mupirocin 2% Ointment 1 Application(s) Both Nostrils two times a day  pantoprazole  Injectable 40 milliGRAM(s) IV Push daily  prednisoLONE acetate 1% Suspension 1 Drop(s) Both EYES two times a day    --------------------------------------------------------------------------------------------------  Study Interpretation:    [[[Abbreviation Key:  PDR=alpha rhythm/posterior dominant rhythm. A-P=anterior posterior gradient.  Amplitude: ‘very low’:<20; ‘low’:20-50; ‘medium’:; ‘high’:>200uV.  Persistence for periodic/rhythmic patterns (% of epoch) ‘rare’:<1%; ‘occasional’:1-10%; ‘frequent’:10-50%; ‘abundant’:50-90%; ‘continuous’:>90%.  Persistence for sporadic discharges: ‘rare’:<1/hr; ‘occasional’:1/min-1/hr; ‘frequent’:>1/min; ‘abundant’:>1/10 sec.  GRDA=generalized rhythmic delta activity; FIRDA=frontal intermittent GRDA; LRDA=lateralized rhythmic delta activity; TIRDA=temporal intermittent rhythmic delta activity;  LPD=PLED=lateralized periodic discharges; GPD=generalized periodic discharges; BiPDs=BiPLEDs=bilateral independent periodic epileptiform discharges; SIRPID=stimulus induced rhythmic, periodic, or ictal appearing discharges; BIRDs=brief potentially ictal rhythmic discharges >4 Hz, lasting .5-10s; PFA=paroxysmal bursts of beta/gamma; LVFA=low voltage fast activity.  Modifiers: +F=with fast component; +S=with spike component; +R=with rhythmic component.  S-B=burst suppression pattern.  Max=maximal. N1-drowsy; N2-stage II sleep; N3-slow wave sleep. SSS/BETS=small sharp spikes/benign epileptiform transients of sleep. HV=hyperventilation; PS=photic stimulation]]]    Daily EEG Visual Analysis    FINDINGS:      Background:  Continuity: continuous  Symmetry: symmetric  PDR: 8-8.5 Hz activity, with amplitude to 30 uV, that attenuated to eye opening.  Low amplitude frontal beta noted in wakefulness.  Reactivity: present  Voltage: normal, mostly 20-150uV  Anterior Posterior Gradient: present  Other background findings: none  Breach: absent    Background Slowing:  Generalized slowing: transient bursts of theta and delta waveforms, at times asynchronous   Focal slowing: none was present.    State Changes:   -Drowsiness and N2 sleep architecture were not captured    Sporadic Epileptiform Discharges:    None    Rhythmic and Periodic Patterns (RPPs):  None     Electrographic and Electroclinical seizures:  None    Other Clinical Events:  None    Activation Procedures:   -Hyperventilation was not performed.     -Photic stimulation was performed and did not elicit any abnormalities.      Artifacts:  Intermittent myogenic and movement artifacts were noted.    ECG:  The heart rate on single channel ECG was predominantly between 50-60 BPM.    EEG Classification / Summary:  Abnormal  EEG in the awake state    1. Moderate generalized background slowing, of nonspecific etiology    Clinical Impression:  There is a moderate diffuse cerebral dysfunction, which can indicate an encephalopathic process, including but not limited to toxic and metabolic.   There were no epileptiform abnormalities recorded.      THIS IS A PRELIMINARY INTERPRETATION ONLY PENDING ATTENDING REVIEW/ATTESTATION    Daniel Vasquez DO  Epilepsy Fellow, PGY-5    -------------------------------------------------------------------------------------------------------  [facility] EEG Reading Room Ph#: [phone]  Epilepsy Answering Service after 5PM and before 8:30AM: Ph#: [phone]       JAMES PATRICK [id number]     Study Date:  		08-01-24  Duration x Hours: 4 hours 15 minutes  --------------------------------------------------------------------------------------------------  History:  CC/ HPI Patient is a 69y old  Male who presents with a chief complaint of Altered mental status     (01 Aug 2024 13:00)    MEDICATIONS  (STANDING):  aspirin enteric coated 81 milliGRAM(s) Oral daily  atorvastatin 40 milliGRAM(s) Oral at bedtime  carvedilol 3.125 milliGRAM(s) Oral every 12 hours  epoetin dinah (EPOGEN) Injectable 56363 Unit(s) IV Push <User Schedule>  glucagon  Injectable 1 milliGRAM(s) IntraMuscular once  heparin   Injectable 5000 Unit(s) SubCutaneous every 12 hours  insulin lispro (ADMELOG) corrective regimen sliding scale   SubCutaneous every 6 hours  latanoprost 0.005% Ophthalmic Solution 1 Drop(s) Both EYES at bedtime  mirtazapine 7.5 milliGRAM(s) Oral daily  mupirocin 2% Ointment 1 Application(s) Both Nostrils two times a day  pantoprazole  Injectable 40 milliGRAM(s) IV Push daily  prednisoLONE acetate 1% Suspension 1 Drop(s) Both EYES two times a day    --------------------------------------------------------------------------------------------------  Study Interpretation:    [[[Abbreviation Key:  PDR=alpha rhythm/posterior dominant rhythm. A-P=anterior posterior gradient.  Amplitude: ‘very low’:<20; ‘low’:20-50; ‘medium’:; ‘high’:>200uV.  Persistence for periodic/rhythmic patterns (% of epoch) ‘rare’:<1%; ‘occasional’:1-10%; ‘frequent’:10-50%; ‘abundant’:50-90%; ‘continuous’:>90%.  Persistence for sporadic discharges: ‘rare’:<1/hr; ‘occasional’:1/min-1/hr; ‘frequent’:>1/min; ‘abundant’:>1/10 sec.  GRDA=generalized rhythmic delta activity; FIRDA=frontal intermittent GRDA; LRDA=lateralized rhythmic delta activity; TIRDA=temporal intermittent rhythmic delta activity;  LPD=PLED=lateralized periodic discharges; GPD=generalized periodic discharges; BiPDs=BiPLEDs=bilateral independent periodic epileptiform discharges; SIRPID=stimulus induced rhythmic, periodic, or ictal appearing discharges; BIRDs=brief potentially ictal rhythmic discharges >4 Hz, lasting .5-10s; PFA=paroxysmal bursts of beta/gamma; LVFA=low voltage fast activity.  Modifiers: +F=with fast component; +S=with spike component; +R=with rhythmic component.  S-B=burst suppression pattern.  Max=maximal. N1-drowsy; N2-stage II sleep; N3-slow wave sleep. SSS/BETS=small sharp spikes/benign epileptiform transients of sleep. HV=hyperventilation; PS=photic stimulation]]]    Daily EEG Visual Analysis    FINDINGS:      Background:  Continuity: continuous  Symmetry: symmetric  PDR: 8-8.5 Hz activity, with amplitude to 30 uV, that attenuated to eye opening.  Low amplitude frontal beta noted in wakefulness.  Reactivity: present  Voltage: normal, mostly 20-150uV  Anterior Posterior Gradient: present  Other background findings: none  Breach: absent    Background Slowing:  Generalized slowing: transient bursts of theta and delta waveforms, at times asynchronous   Focal slowing: none was present.    State Changes:   -Drowsiness and N2 sleep architecture were not captured    Sporadic Epileptiform Discharges:    None    Rhythmic and Periodic Patterns (RPPs):  None     Electrographic and Electroclinical seizures:  None    Other Clinical Events:  None    Activation Procedures:   -Hyperventilation was not performed.     -Photic stimulation was performed and did not elicit any abnormalities.      Artifacts:  Intermittent myogenic and movement artifacts were noted.    ECG:  The heart rate on single channel ECG was predominantly between 50-60 BPM.    EEG Classification / Summary:  Abnormal  EEG in the awake state  1. Mild generalized background slowing, of nonspecific etiology    Clinical Impression:  Mild diffuse cerebral dysfunction, which can indicate an encephalopathic process, including but not limited to toxic and metabolic.   There were no epileptiform abnormalities recorded.        Daniel Vasquez DO  Epilepsy Fellow, PGY-5    -------------------------------------------------------------------------------------------------------  [facility] EEG Reading Room Ph#: [phone]  Epilepsy Answering Service after 5PM and before 8:30AM: Ph#: [phone]

## 2024-08-01 NOTE — DIETITIAN INITIAL EVALUATION ADULT - PERTINENT MEDS FT
MEDICATIONS  (STANDING):  aspirin enteric coated 81 milliGRAM(s) Oral daily  atorvastatin 40 milliGRAM(s) Oral at bedtime  carvedilol 3.125 milliGRAM(s) Oral every 12 hours  chlorhexidine 2% Cloths 1 Application(s) Topical daily  dextrose 5%. 1000 milliLiter(s) (50 mL/Hr) IV Continuous <Continuous>  dextrose 5%. 1000 milliLiter(s) (100 mL/Hr) IV Continuous <Continuous>  dextrose 50% Injectable 12.5 Gram(s) IV Push once  dextrose 50% Injectable 25 Gram(s) IV Push once  dextrose 50% Injectable 25 Gram(s) IV Push once  epoetin dinah (EPOGEN) Injectable 62321 Unit(s) IV Push <User Schedule>  glucagon  Injectable 1 milliGRAM(s) IntraMuscular once  heparin   Injectable 5000 Unit(s) SubCutaneous every 12 hours  insulin lispro (ADMELOG) corrective regimen sliding scale   SubCutaneous every 6 hours  latanoprost 0.005% Ophthalmic Solution 1 Drop(s) Both EYES at bedtime  mirtazapine 7.5 milliGRAM(s) Oral daily  mupirocin 2% Ointment 1 Application(s) Both Nostrils two times a day  pantoprazole  Injectable 40 milliGRAM(s) IV Push daily  prednisoLONE acetate 1% Suspension 1 Drop(s) Both EYES two times a day    MEDICATIONS  (PRN):  dextrose Oral Gel 15 Gram(s) Oral once PRN Blood Glucose LESS THAN 70 milliGRAM(s)/deciliter  sodium chloride 0.9% Bolus. 100 milliLiter(s) IV Bolus every 5 minutes PRN SBP LESS THAN or EQUAL to 80 mmHg

## 2024-08-01 NOTE — DIETITIAN INITIAL EVALUATION ADULT - ADD RECOMMEND
1. Recommend adding Nepro 8oz 2x/day ( 840kcal, 38gm pro) for nutrient support.   2. Consider obtaining update HgA1c.   3. Consider adding Nephro-hannah for micronutrient support.   4. Continue to provide assistance in feeding.   5. Monitor bowel movement and consider adding bowel regimen PRN.   6. Monitor weight, labs, po intake and tolerance, bowel movement, skin integrity.

## 2024-08-01 NOTE — SWALLOW BEDSIDE ASSESSMENT ADULT - SWALLOW EVAL: MANDIBULAR STRENGTH AND MOBILITY
Chief Complaint   Patient presents with    hx bladder cancer     Here in clinic for a cystoscopy    Prior to the start of the procedure DR MUNOZ and with procedural staff participation, I verbally confirmed the patient s identity using two indicators, relevant allergies, that the procedure was appropriate and matched the consent or emergent situation, and that the correct equipment/implants were available. Immediately prior to starting the procedure I conducted the Time Out with the procedural staff and re-confirmed the patient s name, procedure, and site/side. I have wiped the patient off with the povidone-Iodine solution, draped them,  used Lidocaine hydrochloride jelly, and instilled sterile water into the bladder. (The Joint Commission universal protocol was followed.)  Yes    5mL 2% lidocaine hydrochloride Urojet instilled into urethra.    NDC# [address]-2  Lot #: [id number]  Expiration Date:  02-26  Chapincito Jordan CMA     intact

## 2024-08-01 NOTE — PROGRESS NOTE ADULT - PROBLEM SELECTOR PLAN 1
-TME unclear etiology--previously with similar admission.  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  -f/u urinalysis reflex to culture  -obtain formal CT chest, pleural effusion noted on CT head, might have had reaccumulation of large pleural effusion (had prior thoracentesis 8/2023 with transudative effusion)  -hx of prior MRSA bacteremia, f/u blood cultures, UA and Urine cx--unclear infectious source at this time  -vancomycin by level, zosyn 3.375 gram BID  -if clinically not improving will need MR head noncontrast (has loop recorder needs clearance prior)  -Obtained collateral patient more somnolent over the weekend, thought he wanted to go home to meet a family from his dialysis center per niece (HCP), endorses not remembering that his family member had passed a few years. Somewhat conversational at baseline but not always with baseline orientation around AOx2. No recent infections, chills, bleeding etc symptoms leading up to presentation.   -Will f/u neuro if anything new; recent MR and EEG negative two weeks ago. Clinical presentation and w/u not consistent with acute ischemic stroke. -TME unclear etiology--previously with similar admission.  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  -f/u urinalysis reflex to culture  -obtain formal CT chest, pleural effusion noted on CT head, might have had reaccumulation of large pleural effusion (had prior thoracentesis 8/2023 with transudative effusion)  -hx of prior MRSA bacteremia, f/u blood cultures, UA and Urine cx--unclear infectious source at this time  -vancomycin by level, zosyn 3.375 gram BID  -if clinically not improving will need MR head noncontrast (has loop recorder needs clearance prior)  -Obtained collateral patient more somnolent over the weekend, thought he wanted to go home to meet a family from his dialysis center per niece (HCP), endorses not remembering that his family member had passed a few years. Somewhat conversational at baseline but not always with baseline orientation around AOx2. No recent infections, chills, bleeding etc symptoms leading up to presentation.   -Will f/u neuro if anything new; recent MR and EEG negative two weeks ago. Clinical presentation and w/u not consistent with acute ischemic stroke.  -will f/u EEG reading. Patient mental status improving.

## 2024-08-01 NOTE — ADVANCED PRACTICE NURSE CONSULT - RECOMMEDATIONS
Topical recommendations:     Sacral/gluteal fold extending to bilateral inner buttocks- Cleanse with soap and water, pat dry. Apply Martine Antifungal moisture barrier cream twice a day or PRN with episodes of incontinence.     Continue low air loss bed therapy,  turn & reposition q2h with fluidized pillow, continue moisture management with barrier creams as specified above & single breathable pad, continue measures to decrease friction/shear.   Plan discussed with patient and primary RN at bedside, educated on topical wound therapy to optimize wound healing. Questions answered.     ACP provider, Dottie Severino, notified of findings and recs.     Please reconsult if any wound changes or if we can be of further assistance (ext [phone]).

## 2024-08-01 NOTE — PROGRESS NOTE ADULT - PROBLEM SELECTOR PLAN 6
F- restrict if able to resume diet  E-replete prn  N-pureed with mod thick liquids  heparin subQ F- restrict if able to resume diet  E-replete prn  N-pureed with mod thick liquids--will encourage and confirm with daughter re his baseline mental status; patient seems to be improving.  heparin subQ

## 2024-08-01 NOTE — PROGRESS NOTE ADULT - ASSESSMENT
69M with ESRD on HD left AVF MWFr, OU blindness with bilateral occipital lobes chronic infarcts (on ASA 81 mg qd), BKA/AKA functional quadriplegia, CAD, HTN, HLD, Dementia, recent admission for unresponsiveness in early 7/2024 found to be in urosepsis, presents to [facility] ED as code stroke for unresponsiveness. LKW approximately 12 pm.  Before receiving dialysis, patient was found to be unresponsive to voice so EMS was called.  NIHSS 13 initially  MRS 5  Now AAOx 2 on exam but lethargic  CTH with known chronic infarcts, L CPA mass   CTA with known posterior circulation stenosis   Deemed not tnk/thrombectomy candidate  EEG with generalized slowing, no seizures    Impression: Unresponsiveness followed by dysarthria, diaphoresis, asterixis more likely due to diffuse cerebral dysfunction from toxic metabolic and/or infectious etiologies    Recommendations     [] aspirin for seocndary stroke prevention  [] eeg without seizure s- can dc  [] as mental status improving, can hold off on MRI for now  [] infectious workup per primary team  [] abx per primary team - avoid neurotoxic abx  [] C/w home Atorvastatin 40 mg qhs   [] Okay to resume other home medications including for BP if no other contraindications  [] Neurochecks: q4hr   [] BP goals: No need for permissive HTN. C/w home medications and follow long term management goals for HTN. Avoid hypotension (<90/60 mmHg) if possible.   [] PT/OT - back to nursing facility when ready for discharge  [] Diet: Dysphagia screening otherwise swallow evaluation.   [] HgA1C goals < 7.0   [] Lifestyle modifications: smoking cessation, exercise, and a Mediterranean style diet.   [] if patient has a convulsion, please document accurately the length of the episode, and specifically what the patient was doing paying attention to eye opening vs closure, gaze deviation, shaking of extremities, tongue bite, urinary incontinence, any derangement of vital signs  [] Fall Precautions  [] Seizure Precautions  [] Aspiration Precautions  [] Upon discharge, patient can follow up with Dr. Corona;   [address] [place] [address]. Call [phone].

## 2024-08-01 NOTE — DIETITIAN INITIAL EVALUATION ADULT - NS FNS DIET ORDER
Diet, Minced and Moist:   Consistent Carbohydrate {No Snacks} (CSTCHO)  DASH/TLC {Sodium & Cholesterol Restricted} (DASH)  Mildly Thick Liquids (MILDTHICKLIQS) (07-31-24 @ 14:48)

## 2024-08-01 NOTE — PROGRESS NOTE ADULT - PROBLEM SELECTOR PLAN 3
-as above; trops flatlined however likely in the setting of ESRD. Stable EKG. Clinical picture dry/euvolemic.   -AVF site non infected; palpable thrill  -Patient oliguric  -daily mag and phos  -HD MWF. -as above; trops flatlined however likely in the setting of ESRD. Stable EKG. Clinical picture dry/euvolemic.   -AVF site non infected; palpable thrill  -Patient oliguric  -daily mag and phos--will replete PRN  -Patient clinical picture improved s/p dialysis removed 2.5L  -HD MWF.

## 2024-08-02 LAB
ALBUMIN SERPL ELPH-MCNC: 3.3 G/DL — SIGNIFICANT CHANGE UP (ref 3.3–5)
ALP SERPL-CCNC: 102 U/L — SIGNIFICANT CHANGE UP (ref 40–120)
ALT FLD-CCNC: 21 U/L — SIGNIFICANT CHANGE UP (ref 4–41)
ANION GAP SERPL CALC-SCNC: 14 MMOL/L — SIGNIFICANT CHANGE UP (ref 7–14)
AST SERPL-CCNC: 22 U/L — SIGNIFICANT CHANGE UP (ref 4–40)
BILIRUB SERPL-MCNC: 0.3 MG/DL — SIGNIFICANT CHANGE UP (ref 0.2–1.2)
BUN SERPL-MCNC: 30 MG/DL — HIGH (ref 7–23)
CALCIUM SERPL-MCNC: 8.8 MG/DL — SIGNIFICANT CHANGE UP (ref 8.4–10.5)
CHLORIDE SERPL-SCNC: 97 MMOL/L — LOW (ref 98–107)
CO2 SERPL-SCNC: 26 MMOL/L — SIGNIFICANT CHANGE UP (ref 22–31)
CREAT SERPL-MCNC: 4.56 MG/DL — HIGH (ref 0.5–1.3)
EGFR: 13 ML/MIN/1.73M2 — LOW
FLUAV AG NPH QL: SIGNIFICANT CHANGE UP
FLUBV AG NPH QL: SIGNIFICANT CHANGE UP
GLUCOSE BLDC GLUCOMTR-MCNC: 102 MG/DL — HIGH (ref 70–99)
GLUCOSE BLDC GLUCOMTR-MCNC: 163 MG/DL — HIGH (ref 70–99)
GLUCOSE BLDC GLUCOMTR-MCNC: 178 MG/DL — HIGH (ref 70–99)
GLUCOSE BLDC GLUCOMTR-MCNC: 66 MG/DL — LOW (ref 70–99)
GLUCOSE BLDC GLUCOMTR-MCNC: 67 MG/DL — LOW (ref 70–99)
GLUCOSE BLDC GLUCOMTR-MCNC: 83 MG/DL — SIGNIFICANT CHANGE UP (ref 70–99)
GLUCOSE BLDC GLUCOMTR-MCNC: 86 MG/DL — SIGNIFICANT CHANGE UP (ref 70–99)
GLUCOSE BLDC GLUCOMTR-MCNC: 91 MG/DL — SIGNIFICANT CHANGE UP (ref 70–99)
GLUCOSE BLDC GLUCOMTR-MCNC: 93 MG/DL — SIGNIFICANT CHANGE UP (ref 70–99)
GLUCOSE SERPL-MCNC: 109 MG/DL — HIGH (ref 70–99)
HCT VFR BLD CALC: 30.2 % — LOW (ref 39–50)
HGB BLD-MCNC: 9.4 G/DL — LOW (ref 13–17)
MAGNESIUM SERPL-MCNC: 3.2 MG/DL — HIGH (ref 1.6–2.6)
MCHC RBC-ENTMCNC: 27 PG — SIGNIFICANT CHANGE UP (ref 27–34)
MCHC RBC-ENTMCNC: 31.1 GM/DL — LOW (ref 32–36)
MCV RBC AUTO: 86.8 FL — SIGNIFICANT CHANGE UP (ref 80–100)
NRBC # BLD: 0 /100 WBCS — SIGNIFICANT CHANGE UP (ref 0–0)
NRBC # FLD: 0 K/UL — SIGNIFICANT CHANGE UP (ref 0–0)
PHOSPHATE SERPL-MCNC: 4.3 MG/DL — SIGNIFICANT CHANGE UP (ref 2.5–4.5)
PLATELET # BLD AUTO: 206 K/UL — SIGNIFICANT CHANGE UP (ref 150–400)
POTASSIUM SERPL-MCNC: 4.7 MMOL/L — SIGNIFICANT CHANGE UP (ref 3.5–5.3)
POTASSIUM SERPL-SCNC: 4.7 MMOL/L — SIGNIFICANT CHANGE UP (ref 3.5–5.3)
PROT SERPL-MCNC: 7.4 G/DL — SIGNIFICANT CHANGE UP (ref 6–8.3)
RBC # BLD: 3.48 M/UL — LOW (ref 4.2–5.8)
RBC # FLD: 20.8 % — HIGH (ref 10.3–14.5)
RSV RNA NPH QL NAA+NON-PROBE: SIGNIFICANT CHANGE UP
SARS-COV-2 RNA SPEC QL NAA+PROBE: SIGNIFICANT CHANGE UP
SODIUM SERPL-SCNC: 137 MMOL/L — SIGNIFICANT CHANGE UP (ref 135–145)
WBC # BLD: 3.15 K/UL — LOW (ref 3.8–10.5)
WBC # FLD AUTO: 3.15 K/UL — LOW (ref 3.8–10.5)

## 2024-08-02 PROCEDURE — 99232 SBSQ HOSP IP/OBS MODERATE 35: CPT | Mod: GC

## 2024-08-02 PROCEDURE — 71045 X-RAY EXAM CHEST 1 VIEW: CPT | Mod: 26

## 2024-08-02 RX ORDER — INSULIN LISPRO 100/ML
VIAL (ML) SUBCUTANEOUS AT BEDTIME
Refills: 0 | Status: DISCONTINUED | OUTPATIENT
Start: 2024-08-02 | End: 2024-08-06

## 2024-08-02 RX ORDER — INSULIN LISPRO 100/ML
VIAL (ML) SUBCUTANEOUS
Refills: 0 | Status: DISCONTINUED | OUTPATIENT
Start: 2024-08-02 | End: 2024-08-06

## 2024-08-02 RX ORDER — DEXTROSE 4 G
12.5 TABLET,CHEWABLE ORAL ONCE
Refills: 0 | Status: COMPLETED | OUTPATIENT
Start: 2024-08-02 | End: 2024-08-02

## 2024-08-02 RX ADMIN — ATORVASTATIN CALCIUM 40 MILLIGRAM(S): 40 TABLET, FILM COATED ORAL at 21:17

## 2024-08-02 RX ADMIN — EPOETIN ALFA 10000 UNIT(S): 3000 SOLUTION INTRAVENOUS; SUBCUTANEOUS at 18:40

## 2024-08-02 RX ADMIN — Medication 1 DROP(S): at 21:17

## 2024-08-02 RX ADMIN — MUPIROCIN CALCIUM 1 APPLICATION(S): 20 CREAM TOPICAL at 04:33

## 2024-08-02 RX ADMIN — Medication 81 MILLIGRAM(S): at 09:51

## 2024-08-02 RX ADMIN — HEPARIN SODIUM 5000 UNIT(S): 1000 INJECTION, SOLUTION INTRAVENOUS; SUBCUTANEOUS at 04:33

## 2024-08-02 RX ADMIN — PANTOPRAZOLE SODIUM 40 MILLIGRAM(S): 20 TABLET, DELAYED RELEASE ORAL at 09:51

## 2024-08-02 RX ADMIN — Medication 3.12 MILLIGRAM(S): at 04:33

## 2024-08-02 RX ADMIN — CHLORHEXIDINE GLUCONATE 1 APPLICATION(S): 500 CLOTH TOPICAL at 09:54

## 2024-08-02 RX ADMIN — Medication 7.5 MILLIGRAM(S): at 09:52

## 2024-08-02 RX ADMIN — Medication 12.5 GRAM(S): at 22:38

## 2024-08-02 RX ADMIN — PREDNISOLONE ACETATE 1 DROP(S): 10 SUSPENSION/ DROPS OPHTHALMIC at 04:33

## 2024-08-02 NOTE — PROGRESS NOTE ADULT - PROBLEM SELECTOR PLAN 2
-CT chest with Similar appearance of a rounded consolidation in the   left lower lobe adjacent to the pleura with associated architectural   distortion of the surrounding bronchovascular bundles. Increase in right  moderate loculated pleural effusion with associated atelectasis of the   right lower lobe.  -f/u pulm recs re thora vs any concern for PNA in; past ones have been transudative consolidative mass stable with no signs of infection of malignancy per pulm  -f/u sputum cx -CT chest with Similar appearance of a rounded consolidation in the   left lower lobe adjacent to the pleura with associated architectural   distortion of the surrounding bronchovascular bundles. Increase in right  moderate loculated pleural effusion with associated atelectasis of the   right lower lobe.  -f/u pulm recs re thora vs any concern for PNA in; past ones have been transudative consolidative mass stable with no signs of infection of malignancy per pulm  -Patient with new cough 8/2; will f/u RVP, CXR.

## 2024-08-02 NOTE — PROGRESS NOTE ADULT - PROBLEM SELECTOR PLAN 6
F- restrict if able to resume diet  E-replete prn  N-pureed with mod thick liquids--will encourage and confirm with daughter re his baseline mental status; patient seems to be improving.  heparin subQ F- restrict if able to resume diet  E-replete prn  N-pureed with mod thick liquids--will encourage and confirm with daughter re his baseline mental status; patient seems to be improving.  heparin subQ  Dispo to new LTC

## 2024-08-02 NOTE — PROGRESS NOTE ADULT - ASSESSMENT
69M with ESRD on HD left AVF MWFr, OU blindness with bilateral occipital lobes chronic infarcts (on ASA 81 mg qd), BKA/AKA functional quadriplegia, CAD, HTN, HLD, Dementia, recent admission for unresponsiveness in early 7/2024 found to be in urosepsis, presents to [facility] ED as code stroke for unresponsiveness. LKW approximately 12 pm.  Before receiving dialysis, patient was found to be unresponsive to voice so EMS was called.  NIHSS 13 initially  MRS 5  Now AAOx 2 on exam but lethargic  CTH with known chronic infarcts, L CPA mass   CTA with known posterior circulation stenosis   Deemed not tnk/thrombectomy candidate  EEG with generalized slowing, no seizures    Impression: Unresponsiveness followed by dysarthria, diaphoresis, asterixis more likely due to diffuse cerebral dysfunction from toxic metabolic and/or infectious etiologies    Recommendations     [] aspirin for seocndary stroke prevention  [] eeg without seizure s- can dc  [] as mental status improving, can hold off on MRI for now  [] infectious workup per primary team  [] abx per primary team - avoid neurotoxic abx  [] C/w home Atorvastatin 40 mg qhs   [] Okay to resume other home medications including for BP if no other contraindications  [] Neurochecks: q4hr   [] BP goals: No need for permissive HTN. C/w home medications and follow long term management goals for HTN. Avoid hypotension (<90/60 mmHg) if possible.   [] PT/OT - back to nursing facility when ready for discharge  [] Diet: Dysphagia screening otherwise swallow evaluation.   [] HgA1C goals < 7.0   [] Lifestyle modifications: smoking cessation, exercise, and a Mediterranean style diet.   [] if patient has a convulsion, please document accurately the length of the episode, and specifically what the patient was doing paying attention to eye opening vs closure, gaze deviation, shaking of extremities, tongue bite, urinary incontinence, any derangement of vital signs  [] Fall Precautions  [] Seizure Precautions  [] Aspiration Precautions  [] Upon discharge, patient can follow up with Dr. Corona;   [address] [place] [address]. Call [phone].

## 2024-08-02 NOTE — PROGRESS NOTE ADULT - SUBJECTIVE AND OBJECTIVE BOX
Neurology Progress Note    S: Patient seen and examined.     Medications: MEDICATIONS  (STANDING):  aspirin enteric coated 81 milliGRAM(s) Oral daily  atorvastatin 40 milliGRAM(s) Oral at bedtime  carvedilol 3.125 milliGRAM(s) Oral every 12 hours  chlorhexidine 2% Cloths 1 Application(s) Topical daily  dextrose 5%. 1000 milliLiter(s) (100 mL/Hr) IV Continuous <Continuous>  dextrose 5%. 1000 milliLiter(s) (50 mL/Hr) IV Continuous <Continuous>  dextrose 50% Injectable 25 Gram(s) IV Push once  dextrose 50% Injectable 25 Gram(s) IV Push once  dextrose 50% Injectable 12.5 Gram(s) IV Push once  epoetin dinah (EPOGEN) Injectable 86152 Unit(s) IV Push <User Schedule>  glucagon  Injectable 1 milliGRAM(s) IntraMuscular once  heparin   Injectable 5000 Unit(s) SubCutaneous every 12 hours  insulin lispro (ADMELOG) corrective regimen sliding scale   SubCutaneous every 6 hours  latanoprost 0.005% Ophthalmic Solution 1 Drop(s) Both EYES at bedtime  mirtazapine 7.5 milliGRAM(s) Oral daily  mupirocin 2% Ointment 1 Application(s) Both Nostrils two times a day  pantoprazole  Injectable 40 milliGRAM(s) IV Push daily  prednisoLONE acetate 1% Suspension 1 Drop(s) Both EYES two times a day    MEDICATIONS  (PRN):  dextrose Oral Gel 15 Gram(s) Oral once PRN Blood Glucose LESS THAN 70 milliGRAM(s)/deciliter  sodium chloride 0.9% Bolus. 100 milliLiter(s) IV Bolus every 5 minutes PRN SBP LESS THAN or EQUAL to 80 mmHg       Vitals:  Vital Signs Last 24 Hrs  T(C): 36.5 (02 Aug 2024 11:51), Max: 36.7 (01 Aug 2024 17:23)  T(F): 97.7 (02 Aug 2024 11:51), Max: 98 (01 Aug 2024 17:23)  HR: 71 (02 Aug 2024 11:51) (71 - 82)  BP: 151/65 (02 Aug 2024 11:51) (150/60 - 167/73)  BP(mean): --  RR: 18 (02 Aug 2024 11:51) (16 - 18)  SpO2: 100% (02 Aug 2024 11:51) (100% - 100%)    Parameters below as of 02 Aug 2024 04:20  Patient On (Oxygen Delivery Method): nasal cannula  O2 Flow (L/min): 2              General:  Constitutional: Male, appears stated age, in no apparent distress including pain  Head: Normocephalic & Atraumatic.  Respiratory: Breathing comfortably.    Neurological:  MS: Eyes closed, open to noxious stimuli, alert AAox2  Language: Speech is clearer today, still with mild dysarthria     CNs: PERRL (R = 3mm, L = 3mm). No blink to threat b/l; patient able to make out shapes, but not objects or count fingers. Dysconjugate gaze w/ exodeviation. No facial asymmetry initially, on reassessment smile is symmetric. Moderate to severe dysarthria.     Motor: Normal muscle bulk & tone. Bilateral UE drifts, R>L, however there appears to be intermittent loss of tone consistent with asterixis. 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     Sensation: Grimaces symmetrically to noxious stimuli b/l.     Coordination: unable to assess due to visual acuity baseline.     Gait: Patient unable to assess at baseline.       I personally reviewed the below data/images/labs:      LABS:                          8.8    3.38  )-----------( 183      ( 01 Aug 2024 06:20 )             28.6     08-01    137  |  98  |  22  ----------------------------<  76  4.0   |  27  |  2.98<H>    Ca    8.3<L>      01 Aug 2024 06:20  Phos  2.2     08-01  Mg     2.80     08-01    TPro  7.3  /  Alb  3.0<L>  /  TBili  0.3  /  DBili  x   /  AST  24  /  ALT  26  /  AlkPhos  103  08-01    LIVER FUNCTIONS - ( 01 Aug 2024 06:20 )  Alb: 3.0 g/dL / Pro: 7.3 g/dL / ALK PHOS: 103 U/L / ALT: 26 U/L / AST: 24 U/L / GGT: x             Urinalysis Basic - ( 01 Aug 2024 06:20 )    Color: x / Appearance: x / SG: x / pH: x  Gluc: 76 mg/dL / Ketone: x  / Bili: x / Urobili: x   Blood: x / Protein: x / Nitrite: x   Leuk Esterase: x / RBC: x / WBC x   Sq Epi: x / Non Sq Epi: x / Bacteria: x        CTH/CTA/CTP:    There are chronic infarcts in both PCA territories and in the cerebellum   bilaterally, unchanged. There is also a chronic infarct at the left   superior frontal gyrus. There are small chronic white matter infarcts in   the right corona radiata and centrum semiovale, better seen on theprior   MRI.    A hyperdense mass is noted at the left CPA. There are dense   calcifications at the interhemispheric fissure and in the left middle   frontal gyrus. There are extensive vascular calcifications within the ECA   branches in the soft tissues of the scalp. Accounting for differences in   technique, all of these findings appear unchanged.    Moderate generalized cerebral volume loss, with distention of the sulci   and concomitant ex-vacuo ventricular dilatation. Moderate to severe   nonspecific low attenuation in the periventricular and subcortical white   matter, likely due to small vessel disease.    No acute intracranial hemorrhage. No midline shift or herniation.    The visualized sinuses and mastoids are clear. Left lens implant. Limited   views of the orbits and visualized soft tissues of the neck, face, scalp,   skull base, and calvarium are otherwise unremarkable.    Using a threshold of 30%, there is an rCBF defect in the right PCA   territory measuring 5 mL, corresponding to a chronic infarct on the CT.   Using a threshold of 6s, there is a matching Tmax abnormality in the   right PCA territory measuring 5 mL. No evidence of a perfusion mismatch   to suggest brain tissue at risk, although MRI would be more sensitive for   acute ischemia.    There is calcified atheromatous plaque at the great vessel origins, with   mild narrowing less than 10%. The left vertebral artery arises directly   from the arch and is congenitally hypoplastic. There is calcified plaque   at the origin of the dominant right vertebral artery and at the right   V2/V3 segment, with mild narrowing.    The carotid bifurcations are not well seen due to patient   motion/swallowing artifact. On the right, there is heavily calcified   plaque with 40-50% stenosis of the proximal right ICA. On the left, there   is calcified plaque at the carotid bifurcation and ICA origin with less   than 10% narrowing.    Otherwise, the right and left ICAs, CCAs, vertebrals, subclavians, and   the brachiocephalic artery are negative. No ulcerated plaque or arterial   dissection.    Intracranially, there is calcified plaque with moderate right and   moderate to severe left V4 vertebral segment stenoses. The left vertebral   artery probably ends in a PICA, without joining the basilar. The basilar   artery is diffusely hypoplastic and at least moderately stenosed.   Correlate clinically for vertebrobasilar insufficiency.    There is heavily calcified plaque with mild to moderate narrowing of the   C4-C6 ICA segments bilaterally. There is fetal origin of the right PCA.   There are mild to moderate stenotic lesions in the M1 segment of the   right MCA and in the PCOM segment of the fetal right PCA. There is   heavily calcified plaque with moderate tosevere narrowing in both A2   segments distally. There is adequate runoff, so this could be   artifactually accentuated.    No intracranial aneurysms or large vessel occlusions. No large feeding   arteries or draining veins. The ACAs, MCAs, PCAs, andcarotid siphons are   otherwise negative. The basilar artery and V4 vertebral segments are   otherwise negative. Abnormalities of  vessels and distal   intracranial branches are beyond the resolution of this technique, and   catheter angiography would be more sensitive.    The dural venous sinuses are grossly patent, although this examination   wasn't optimized to evaluate the cerebral veins. Mild degenerative   changes are noted in the cervical spine. No gross evidence of an acute   fracture, although this examination wasn't optimized to evaluate the   spine. There are bilateral pleural effusions, with passive atelectasis.   There is septal thickening at the lung apices.    IMPRESSION:    1.  No acute intracranial hemorrhage or acute retrievable clot.  2.  MRI would be more sensitive for acute ischemia.  3.  Stable chronic infarcts, senescent cerebral changes, and hyperdense   mass at the left CPA.  4.  Proximal right ICA stenosis, 40-50%.  5.  Multifocal stenotic lesions in the basilar artery and both V4   vertebral segments.  6.  Correlate clinically for vertebrobasilar insufficiency.  7.  Heavily calcified chronic plaque with moderate to severe bilateral   DENISE stenoses.        MRI brain w/w/o contrast (Epilepsy protocol) 7/2024:    1. Unchanged left-sided CP angle vestibular schwannoma.    2. Multiple unchanged chronic intracranial findings, as discussed.    3. Similar-appearing extensive chronic white matter microvascular type   changes.    4. No evidence of acute intracranial hemorrhage or acute cerebral   ischemia.    EEG x 24 hours 7/2024:    Mild bilateral temporal focal cerebral dysfunction can be structural or functional in etiology.  No epileptiform abnormalities or seizures.      EEG Classification / Summary:  Abnormal  EEG in the awake state  1. Mild generalized background slowing, of nonspecific etiology    Clinical Impression:  Mild diffuse cerebral dysfunction, which can indicate an encephalopathic process, including but not limited to toxic and metabolic.   There were no epileptiform abnormalities recorded.

## 2024-08-02 NOTE — PROGRESS NOTE ADULT - PROBLEM SELECTOR PLAN 3
-as above; trops flatlined however likely in the setting of ESRD. Stable EKG. Clinical picture dry/euvolemic.   -AVF site non infected; palpable thrill  -Patient oliguric  -daily mag and phos--will replete PRN  -Patient clinical picture improved s/p dialysis removed 2.5L  -HD MWF.

## 2024-08-02 NOTE — PROGRESS NOTE ADULT - ASSESSMENT
69 year old M hx of ESRD on HD left AVF MWFr, CVA w/ gliosis, BKA/AKA functional quadriplegia, CAD, HTN, HLD, sent from dialysis unit for AMS. nephrology consulted for dialysis needs    ESRD:  On HD TIW via [facility].  Schedule is MWF. Consent obtained and charted from HCP Ramonita Vincent [phone]  s/p hd 7/31 uf 2L  hd today  - Renal diet.    AMS  s/p stroke code  f/u neuro  seems better today    anemia  below goal   ct head done without acute finding  epo with hd  monitor    htn  acceptable  monitor    hypophosphatemia  not on binder  supplement k-phox 15x1   monitor

## 2024-08-02 NOTE — PROGRESS NOTE ADULT - SUBJECTIVE AND OBJECTIVE BOX
Dottie Severino PGY1  pager [phone] or check resident tab for coverage    Patient is a 69y old  Male who presents with a chief complaint of Altered mental status     (01 Aug 2024 13:00)      SUBJECTIVE / OVERNIGHT EVENTS:    MEDICATIONS  (STANDING):  aspirin enteric coated 81 milliGRAM(s) Oral daily  atorvastatin 40 milliGRAM(s) Oral at bedtime  carvedilol 3.125 milliGRAM(s) Oral every 12 hours  chlorhexidine 2% Cloths 1 Application(s) Topical daily  dextrose 5%. 1000 milliLiter(s) (50 mL/Hr) IV Continuous <Continuous>  dextrose 5%. 1000 milliLiter(s) (100 mL/Hr) IV Continuous <Continuous>  dextrose 50% Injectable 25 Gram(s) IV Push once  dextrose 50% Injectable 25 Gram(s) IV Push once  dextrose 50% Injectable 12.5 Gram(s) IV Push once  epoetin dinah (EPOGEN) Injectable 78967 Unit(s) IV Push <User Schedule>  glucagon  Injectable 1 milliGRAM(s) IntraMuscular once  heparin   Injectable 5000 Unit(s) SubCutaneous every 12 hours  insulin lispro (ADMELOG) corrective regimen sliding scale   SubCutaneous every 6 hours  latanoprost 0.005% Ophthalmic Solution 1 Drop(s) Both EYES at bedtime  mirtazapine 7.5 milliGRAM(s) Oral daily  mupirocin 2% Ointment 1 Application(s) Both Nostrils two times a day  pantoprazole  Injectable 40 milliGRAM(s) IV Push daily  prednisoLONE acetate 1% Suspension 1 Drop(s) Both EYES two times a day    MEDICATIONS  (PRN):  dextrose Oral Gel 15 Gram(s) Oral once PRN Blood Glucose LESS THAN 70 milliGRAM(s)/deciliter  sodium chloride 0.9% Bolus. 100 milliLiter(s) IV Bolus every 5 minutes PRN SBP LESS THAN or EQUAL to 80 mmHg      CAPILLARY BLOOD GLUCOSE      POCT Blood Glucose.: 91 mg/dL (02 Aug 2024 04:29)  POCT Blood Glucose.: 136 mg/dL (01 Aug 2024 21:52)  POCT Blood Glucose.: 138 mg/dL (01 Aug 2024 17:48)  POCT Blood Glucose.: 91 mg/dL (01 Aug 2024 12:41)  POCT Blood Glucose.: 91 mg/dL (01 Aug 2024 10:02)    I&O's Summary    01 Aug 2024 07:01  -  02 Aug 2024 07:00  --------------------------------------------------------  IN: 500 mL / OUT: 0 mL / NET: 500 mL        Vital Signs Last 24 Hrs  T(C): 36.6 (02 Aug 2024 04:20), Max: 36.7 (01 Aug 2024 12:06)  T(F): 97.8 (02 Aug 2024 04:20), Max: 98.1 (01 Aug 2024 12:06)  HR: 71 (02 Aug 2024 04:20) (71 - 82)  BP: 157/68 (02 Aug 2024 04:20) (144/61 - 167/73)  BP(mean): --  RR: 18 (02 Aug 2024 04:20) (16 - 18)  SpO2: 100% (02 Aug 2024 04:20) (94% - 100%)    Parameters below as of 02 Aug 2024 04:20  Patient On (Oxygen Delivery Method): nasal cannula  O2 Flow (L/min): 2      PHYSICAL EXAM:  GENERAL: no distress  PSYCH: A&O x3  HEAD: Atraumatic, Normocephalic  NECK: Supple, No JVD  CHEST/LUNG: clear to auscultation bilaterally  HEART: regular rate and rhythm, no murmurs  ABDOMEN: nontender to palpation, no rebound tenderness/guarding  EXTREMITIES: no edema on bilateral LE  NEUROLOGY: no focal neurologic deficit  SKIN: No rashes or lesions    LABS:                        8.8    3.38  )-----------( 183      ( 01 Aug 2024 06:20 )             28.6      08-01    137  |  98  |  22  ----------------------------<  76  4.0   |  27  |  2.98<H>    Ca    8.3<L>      01 Aug 2024 06:20  Phos  2.2     08-01  Mg     2.80     08-01    TPro  7.3  /  Alb  3.0<L>  /  TBili  0.3  /  DBili  x   /  AST  24  /  ALT  26  /  AlkPhos  103  08-01          Urinalysis Basic - ( 01 Aug 2024 06:20 )    Color: x / Appearance: x / SG: x / pH: x  Gluc: 76 mg/dL / Ketone: x  / Bili: x / Urobili: x   Blood: x / Protein: x / Nitrite: x   Leuk Esterase: x / RBC: x / WBC x   Sq Epi: x / Non Sq Epi: x / Bacteria: x        RADIOLOGY & ADDITIONAL TESTS:    Imaging Personally Reviewed:    Consultant(s) Notes Reviewed:      Care Discussed with Consultants/Other Providers:   Dottie Severino PGY3  pager [phone] or check resident tab for coverage    Patient is a 69y old  Male who presents with a chief complaint of Altered mental status     (01 Aug 2024 13:00)      SUBJECTIVE / OVERNIGHT EVENTS: Patient with vomiting overnight and received zofran. Patient also c/o new cough this AM with sputum. scheduled for HD today.    MEDICATIONS  (STANDING):  aspirin enteric coated 81 milliGRAM(s) Oral daily  atorvastatin 40 milliGRAM(s) Oral at bedtime  carvedilol 3.125 milliGRAM(s) Oral every 12 hours  chlorhexidine 2% Cloths 1 Application(s) Topical daily  dextrose 5%. 1000 milliLiter(s) (50 mL/Hr) IV Continuous <Continuous>  dextrose 5%. 1000 milliLiter(s) (100 mL/Hr) IV Continuous <Continuous>  dextrose 50% Injectable 25 Gram(s) IV Push once  dextrose 50% Injectable 25 Gram(s) IV Push once  dextrose 50% Injectable 12.5 Gram(s) IV Push once  epoetin dinah (EPOGEN) Injectable 88928 Unit(s) IV Push <User Schedule>  glucagon  Injectable 1 milliGRAM(s) IntraMuscular once  heparin   Injectable 5000 Unit(s) SubCutaneous every 12 hours  insulin lispro (ADMELOG) corrective regimen sliding scale   SubCutaneous every 6 hours  latanoprost 0.005% Ophthalmic Solution 1 Drop(s) Both EYES at bedtime  mirtazapine 7.5 milliGRAM(s) Oral daily  mupirocin 2% Ointment 1 Application(s) Both Nostrils two times a day  pantoprazole  Injectable 40 milliGRAM(s) IV Push daily  prednisoLONE acetate 1% Suspension 1 Drop(s) Both EYES two times a day    MEDICATIONS  (PRN):  dextrose Oral Gel 15 Gram(s) Oral once PRN Blood Glucose LESS THAN 70 milliGRAM(s)/deciliter  sodium chloride 0.9% Bolus. 100 milliLiter(s) IV Bolus every 5 minutes PRN SBP LESS THAN or EQUAL to 80 mmHg      CAPILLARY BLOOD GLUCOSE      POCT Blood Glucose.: 91 mg/dL (02 Aug 2024 04:29)  POCT Blood Glucose.: 136 mg/dL (01 Aug 2024 21:52)  POCT Blood Glucose.: 138 mg/dL (01 Aug 2024 17:48)  POCT Blood Glucose.: 91 mg/dL (01 Aug 2024 12:41)  POCT Blood Glucose.: 91 mg/dL (01 Aug 2024 10:02)    I&O's Summary    01 Aug 2024 07:01  -  02 Aug 2024 07:00  --------------------------------------------------------  IN: 500 mL / OUT: 0 mL / NET: 500 mL        Vital Signs Last 24 Hrs  T(C): 36.6 (02 Aug 2024 04:20), Max: 36.7 (01 Aug 2024 12:06)  T(F): 97.8 (02 Aug 2024 04:20), Max: 98.1 (01 Aug 2024 12:06)  HR: 71 (02 Aug 2024 04:20) (71 - 82)  BP: 157/68 (02 Aug 2024 04:20) (144/61 - 167/73)  BP(mean): --  RR: 18 (02 Aug 2024 04:20) (16 - 18)  SpO2: 100% (02 Aug 2024 04:20) (94% - 100%)    Parameters below as of 02 Aug 2024 04:20  Patient On (Oxygen Delivery Method): nasal cannula  O2 Flow (L/min): 2      PHYSICAL EXAM:  GENERAL: no distress  PSYCH: A&O x1-2  HEAD: Atraumatic, Normocephalic  NECK: Supple, No JVD  CHEST/LUNG: +trace crackles LLQ  HEART: regular rate and rhythm, no murmurs  ABDOMEN: nontender to palpation, no rebound tenderness/guarding  EXTREMITIES: no edema on bilateral LE +BKA +AKA  NEUROLOGY: no focal neurologic deficit  SKIN: No rashes or lesions    LABS:                        8.8    3.38  )-----------( 183      ( 01 Aug 2024 06:20 )             28.6      08-01    137  |  98  |  22  ----------------------------<  76  4.0   |  27  |  2.98<H>    Ca    8.3<L>      01 Aug 2024 06:20  Phos  2.2     08-01  Mg     2.80     08-01    TPro  7.3  /  Alb  3.0<L>  /  TBili  0.3  /  DBili  x   /  AST  24  /  ALT  26  /  AlkPhos  103  08-01          Urinalysis Basic - ( 01 Aug 2024 06:20 )    Color: x / Appearance: x / SG: x / pH: x  Gluc: 76 mg/dL / Ketone: x  / Bili: x / Urobili: x   Blood: x / Protein: x / Nitrite: x   Leuk Esterase: x / RBC: x / WBC x   Sq Epi: x / Non Sq Epi: x / Bacteria: x        RADIOLOGY & ADDITIONAL TESTS:    Imaging Personally Reviewed:    Consultant(s) Notes Reviewed:      Care Discussed with Consultants/Other Providers:

## 2024-08-02 NOTE — PROGRESS NOTE ADULT - PROBLEM SELECTOR PLAN 1
-TME unclear etiology--previously with similar admission.  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  -f/u urinalysis reflex to culture  -obtain formal CT chest, pleural effusion noted on CT head, might have had reaccumulation of large pleural effusion (had prior thoracentesis 8/2023 with transudative effusion)  -hx of prior MRSA bacteremia, f/u blood cultures, UA and Urine cx--unclear infectious source at this time  -vancomycin by level, zosyn 3.375 gram BID  -if clinically not improving will need MR head noncontrast (has loop recorder needs clearance prior)  -Obtained collateral patient more somnolent over the weekend, thought he wanted to go home to meet a family from his dialysis center per niece (HCP), endorses not remembering that his family member had passed a few years. Somewhat conversational at baseline but not always with baseline orientation around AOx2. No recent infections, chills, bleeding etc symptoms leading up to presentation.   -Will f/u neuro if anything new; recent MR and EEG negative two weeks ago. Clinical presentation and w/u not consistent with acute ischemic stroke.  -will f/u EEG reading. Patient mental status improving. -TME unclear etiology--previously with similar admission.  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  -f/u urinalysis reflex to culture  -obtain formal CT chest, pleural effusion noted on CT head, might have had reaccumulation of large pleural effusion (had prior thoracentesis 8/2023 with transudative effusion)  -hx of prior MRSA bacteremia, f/u blood cultures, UA and Urine cx--unclear infectious source at this time  -vancomycin by level, zosyn 3.375 gram BID  -if clinically not improving will need MR head noncontrast (has loop recorder needs clearance prior)  -Obtained collateral patient more somnolent over the weekend, thought he wanted to go home to meet a family from his dialysis center per niece (HCP), endorses not remembering that his family member had passed a few years. Somewhat conversational at baseline but not always with baseline orientation around AOx2. No recent infections, chills, bleeding etc symptoms leading up to presentation.   -Will f/u neuro if anything new; recent MR and EEG negative two weeks ago. Clinical presentation and w/u not consistent with acute ischemic stroke.  -EEG reading non specific however Patient mental status improving. No need for further MRI

## 2024-08-02 NOTE — PROGRESS NOTE ADULT - SUBJECTIVE AND OBJECTIVE BOX
[facility] [place] [facility]   MD MARIBELL CARRION MD ANGELA WONG, PA    TEL:  OFFICE: [phone]  From 5pm-7am Answering Service [phone]    -- RENAL FOLLOW UP NOTE ---Date of Service 08-02-24 @ 09:56    Patient is a 69y old  Male who presents with a chief complaint of expressive aphasia (02 Aug 2024 07:43)      Patient seen and examined at bedside. No chest pain/sob    VITALS:  T(F): 97.8 (08-02-24 @ 04:20), Max: 98.1 (08-01-24 @ 12:06)  HR: 71 (08-02-24 @ 04:20)  BP: 157/68 (08-02-24 @ 04:20)  RR: 18 (08-02-24 @ 04:20)  SpO2: 100% (08-02-24 @ 04:20)  Wt(kg): --    08-01 @ 07:01  -  08-02 @ 07:00  --------------------------------------------------------  IN: 500 mL / OUT: 0 mL / NET: 500 mL          PHYSICAL EXAM:  General: NAD  Neck: No JVD  Respiratory: CTAB, no wheezes, rales or rhonchi  Cardiovascular: S1, S2, RRR  Gastrointestinal: BS+, soft, NT/ND  Extremities: b/l amputation     Hospital Medications:   MEDICATIONS  (STANDING):  aspirin enteric coated 81 milliGRAM(s) Oral daily  atorvastatin 40 milliGRAM(s) Oral at bedtime  carvedilol 3.125 milliGRAM(s) Oral every 12 hours  chlorhexidine 2% Cloths 1 Application(s) Topical daily  dextrose 5%. 1000 milliLiter(s) (50 mL/Hr) IV Continuous <Continuous>  dextrose 5%. 1000 milliLiter(s) (100 mL/Hr) IV Continuous <Continuous>  dextrose 50% Injectable 12.5 Gram(s) IV Push once  dextrose 50% Injectable 25 Gram(s) IV Push once  dextrose 50% Injectable 25 Gram(s) IV Push once  epoetin dniah (EPOGEN) Injectable 79400 Unit(s) IV Push <User Schedule>  glucagon  Injectable 1 milliGRAM(s) IntraMuscular once  heparin   Injectable 5000 Unit(s) SubCutaneous every 12 hours  insulin lispro (ADMELOG) corrective regimen sliding scale   SubCutaneous every 6 hours  latanoprost 0.005% Ophthalmic Solution 1 Drop(s) Both EYES at bedtime  mirtazapine 7.5 milliGRAM(s) Oral daily  mupirocin 2% Ointment 1 Application(s) Both Nostrils two times a day  pantoprazole  Injectable 40 milliGRAM(s) IV Push daily  prednisoLONE acetate 1% Suspension 1 Drop(s) Both EYES two times a day      LABS:  08-01    137  |  98  |  22  ----------------------------<  76  4.0   |  27  |  2.98<H>    Ca    8.3<L>      01 Aug 2024 06:20  Phos  2.2     08-01  Mg     2.80     08-01    TPro  7.3  /  Alb  3.0<L>  /  TBili  0.3  /  DBili      /  AST  24  /  ALT  26  /  AlkPhos  103  08-01    Creatinine Trend: 2.98 <--, 4.10 <--, 3.94 <--, 3.21 <--, 2.42 <--, 4.93 <--                                8.8    3.38  )-----------( 183      ( 01 Aug 2024 06:20 )             28.6     Urine Studies:  Urinalysis - [08-01-24 @ 06:20]      Color  / Appearance  / SG  / pH       Gluc 76 / Ketone   / Bili  / Urobili        Blood  / Protein  / Leuk Est  / Nitrite       RBC  / WBC  / Hyaline  / Gran  / Sq Epi  / Non Sq Epi  / Bacteria       Iron 42, TIBC 127, %sat 33      [05-11-24 @ 07:26]  Ferritin 1680      [05-11-24 @ 07:26]  PTH -- (Ca --)      [04-28-24 @ 05:00]   155  TSH 1.45      [07-31-24 @ 07:45]  Lipid: chol 119, TG 66, HDL 46, LDL --      [10-07-23 @ 09:30]    HBsAb 55.8      [07-03-24 @ 23:30]  HBsAg Nonreact      [07-03-24 @ 23:30]  HBcAb Nonreact      [07-03-24 @ 23:30]  HCV 0.11, Nonreact      [07-03-24 @ 23:30]      RADIOLOGY & ADDITIONAL STUDIES:

## 2024-08-03 LAB
ALBUMIN SERPL ELPH-MCNC: 3.4 G/DL — SIGNIFICANT CHANGE UP (ref 3.3–5)
ALP SERPL-CCNC: 108 U/L — SIGNIFICANT CHANGE UP (ref 40–120)
ALT FLD-CCNC: 23 U/L — SIGNIFICANT CHANGE UP (ref 4–41)
ANION GAP SERPL CALC-SCNC: 15 MMOL/L — HIGH (ref 7–14)
AST SERPL-CCNC: 32 U/L — SIGNIFICANT CHANGE UP (ref 4–40)
BILIRUB SERPL-MCNC: 0.4 MG/DL — SIGNIFICANT CHANGE UP (ref 0.2–1.2)
BUN SERPL-MCNC: 16 MG/DL — SIGNIFICANT CHANGE UP (ref 7–23)
CALCIUM SERPL-MCNC: 9.1 MG/DL — SIGNIFICANT CHANGE UP (ref 8.4–10.5)
CHLORIDE SERPL-SCNC: 98 MMOL/L — SIGNIFICANT CHANGE UP (ref 98–107)
CO2 SERPL-SCNC: 26 MMOL/L — SIGNIFICANT CHANGE UP (ref 22–31)
CREAT SERPL-MCNC: 3.15 MG/DL — HIGH (ref 0.5–1.3)
CULTURE RESULTS: SIGNIFICANT CHANGE UP
CULTURE RESULTS: SIGNIFICANT CHANGE UP
EGFR: 20 ML/MIN/1.73M2 — LOW
GLUCOSE BLDC GLUCOMTR-MCNC: 105 MG/DL — HIGH (ref 70–99)
GLUCOSE BLDC GLUCOMTR-MCNC: 68 MG/DL — LOW (ref 70–99)
GLUCOSE BLDC GLUCOMTR-MCNC: 72 MG/DL — SIGNIFICANT CHANGE UP (ref 70–99)
GLUCOSE BLDC GLUCOMTR-MCNC: 75 MG/DL — SIGNIFICANT CHANGE UP (ref 70–99)
GLUCOSE BLDC GLUCOMTR-MCNC: 81 MG/DL — SIGNIFICANT CHANGE UP (ref 70–99)
GLUCOSE SERPL-MCNC: 58 MG/DL — LOW (ref 70–99)
HBV CORE AB SER-ACNC: SIGNIFICANT CHANGE UP
HBV CORE IGM SER-ACNC: SIGNIFICANT CHANGE UP
HBV SURFACE AB SER-ACNC: 49.7 MIU/ML — SIGNIFICANT CHANGE UP
HBV SURFACE AG SER-ACNC: SIGNIFICANT CHANGE UP
HCV AB S/CO SERPL IA: 0.12 S/CO — SIGNIFICANT CHANGE UP (ref 0–0.99)
HCV AB SERPL-IMP: SIGNIFICANT CHANGE UP
MAGNESIUM SERPL-MCNC: 2.7 MG/DL — HIGH (ref 1.6–2.6)
PHOSPHATE SERPL-MCNC: 3 MG/DL — SIGNIFICANT CHANGE UP (ref 2.5–4.5)
POTASSIUM SERPL-MCNC: 4.8 MMOL/L — SIGNIFICANT CHANGE UP (ref 3.5–5.3)
POTASSIUM SERPL-SCNC: 4.8 MMOL/L — SIGNIFICANT CHANGE UP (ref 3.5–5.3)
PROT SERPL-MCNC: 8.1 G/DL — SIGNIFICANT CHANGE UP (ref 6–8.3)
SODIUM SERPL-SCNC: 139 MMOL/L — SIGNIFICANT CHANGE UP (ref 135–145)
SPECIMEN SOURCE: SIGNIFICANT CHANGE UP
SPECIMEN SOURCE: SIGNIFICANT CHANGE UP

## 2024-08-03 PROCEDURE — 99232 SBSQ HOSP IP/OBS MODERATE 35: CPT

## 2024-08-03 RX ORDER — MIRTAZAPINE 15 MG
7.5 TABLET ORAL AT BEDTIME
Refills: 0 | Status: DISCONTINUED | OUTPATIENT
Start: 2024-08-04 | End: 2024-08-06

## 2024-08-03 RX ADMIN — CHLORHEXIDINE GLUCONATE 1 APPLICATION(S): 500 CLOTH TOPICAL at 11:53

## 2024-08-03 RX ADMIN — MUPIROCIN CALCIUM 1 APPLICATION(S): 20 CREAM TOPICAL at 17:13

## 2024-08-03 RX ADMIN — HEPARIN SODIUM 5000 UNIT(S): 1000 INJECTION, SOLUTION INTRAVENOUS; SUBCUTANEOUS at 17:13

## 2024-08-03 RX ADMIN — Medication 1 DROP(S): at 21:26

## 2024-08-03 RX ADMIN — PREDNISOLONE ACETATE 1 DROP(S): 10 SUSPENSION/ DROPS OPHTHALMIC at 05:47

## 2024-08-03 RX ADMIN — PREDNISOLONE ACETATE 1 DROP(S): 10 SUSPENSION/ DROPS OPHTHALMIC at 17:13

## 2024-08-03 RX ADMIN — Medication 3.12 MILLIGRAM(S): at 05:47

## 2024-08-03 RX ADMIN — PANTOPRAZOLE SODIUM 40 MILLIGRAM(S): 20 TABLET, DELAYED RELEASE ORAL at 11:48

## 2024-08-03 RX ADMIN — Medication 3.12 MILLIGRAM(S): at 17:13

## 2024-08-03 RX ADMIN — HEPARIN SODIUM 5000 UNIT(S): 1000 INJECTION, SOLUTION INTRAVENOUS; SUBCUTANEOUS at 05:47

## 2024-08-03 RX ADMIN — ATORVASTATIN CALCIUM 40 MILLIGRAM(S): 40 TABLET, FILM COATED ORAL at 21:26

## 2024-08-03 RX ADMIN — MUPIROCIN CALCIUM 1 APPLICATION(S): 20 CREAM TOPICAL at 05:47

## 2024-08-03 RX ADMIN — Medication 81 MILLIGRAM(S): at 11:48

## 2024-08-03 RX ADMIN — Medication 7.5 MILLIGRAM(S): at 11:48

## 2024-08-03 NOTE — PROGRESS NOTE ADULT - PROBLEM SELECTOR PLAN 2
-CT chest with Similar appearance of a rounded consolidation in the left lower lobe adjacent to the pleura with associated architectural distortion of the surrounding bronchovascular bundles. Increase in right moderate loculated pleural effusion with associated atelectasis of the right lower lobe.  pulm rec for HD as effusions likely due to fluid overload  -Patient with new cough 8/2, CXR largely unchanged per my read, RVP neg

## 2024-08-03 NOTE — RAPID RESPONSE TEAM SUMMARY - NSSITUATIONBACKGROUNDRRT_GEN_ALL_CORE
69-year-old male with past medical history of ESRD on hemodialysis MWF left AV fistula, HTN, HLD, CAD, CVA, dementia a/o times 1-2 baseline who arrives due to minimal responsiveness during hemodialysis.    RRT called for change in mental status. As per primary nurse at bedside, patient was more responsive earlier in the day. Currently, as per the primary nurse he is less responsive and is not following commands. Vitals checked and stable. Physical exam without focal neurological deficits. It appears that during the examination the patient became more alert and awake. Primary team arrived at bedside and confirmed that improved mental status of the patient is that of his baseline. With stable vitals and hx of transient AMS, now at baseline, RRT ended. Patient stable to remain on floor. Primary team to follow-up.

## 2024-08-03 NOTE — PROGRESS NOTE ADULT - PROBLEM SELECTOR PLAN 1
unclear etiology--previously with similar admission.  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    infectious w/u unremarkable; abx stopped    recent MR and EEG negative two weeks ago. Clinical presentation and w/u not consistent with acute ischemic stroke.  -EEG reading non specific however Patient mental status improving. No need for further MRI

## 2024-08-03 NOTE — PROGRESS NOTE ADULT - SUBJECTIVE AND OBJECTIVE BOX
[facility] [place] [facility]   MD MARIBELL CARRION MD MARIA SANTIAGO, NP    TEL:  OFFICE: [phone]  From 5pm-7am Answering Service [phone]    -- RENAL FOLLOW UP NOTE ---Date of Service 08-03-24 @ 13:07    Patient is a 69y old  Male who presents with a chief complaint of expressive aphasia       Patient seen and examined at bedside. No chest pain/sob    VITALS:  T(F): 98.7 (08-03-24 @ 09:00), Max: 98.7 (08-03-24 @ 09:00)  HR: 70 (08-03-24 @ 09:00)  BP: 155/66 (08-03-24 @ 09:00)  RR: 18 (08-03-24 @ 09:00)  SpO2: 93% (08-03-24 @ 09:00)  Wt(kg): --    08-02 @ 07:01  -  08-03 @ 07:00  --------------------------------------------------------  IN: 500 mL / OUT: 3000 mL / NET: -2500 mL          PHYSICAL EXAM:  General: NAD; AOx2  Neck: No JVD  Respiratory: CTAB, no wheezes, rales or rhonchi  Cardiovascular: S1, S2, RRR  Gastrointestinal: BS+, soft, NT/ND  Extremities: b/l [facility] Medications:   MEDICATIONS  (STANDING):  aspirin enteric coated 81 milliGRAM(s) Oral daily  atorvastatin 40 milliGRAM(s) Oral at bedtime  carvedilol 3.125 milliGRAM(s) Oral every 12 hours  chlorhexidine 2% Cloths 1 Application(s) Topical daily  dextrose 5%. 1000 milliLiter(s) (100 mL/Hr) IV Continuous <Continuous>  dextrose 5%. 1000 milliLiter(s) (50 mL/Hr) IV Continuous <Continuous>  dextrose 50% Injectable 25 Gram(s) IV Push once  dextrose 50% Injectable 12.5 Gram(s) IV Push once  dextrose 50% Injectable 25 Gram(s) IV Push once  epoetin dinah (EPOGEN) Injectable 79183 Unit(s) IV Push <User Schedule>  glucagon  Injectable 1 milliGRAM(s) IntraMuscular once  heparin   Injectable 5000 Unit(s) SubCutaneous every 12 hours  insulin lispro (ADMELOG) corrective regimen sliding scale   SubCutaneous at bedtime  insulin lispro (ADMELOG) corrective regimen sliding scale   SubCutaneous three times a day with meals  latanoprost 0.005% Ophthalmic Solution 1 Drop(s) Both EYES at bedtime  mirtazapine 7.5 milliGRAM(s) Oral daily  mupirocin 2% Ointment 1 Application(s) Both Nostrils two times a day  pantoprazole  Injectable 40 milliGRAM(s) IV Push daily  prednisoLONE acetate 1% Suspension 1 Drop(s) Both EYES two times a day      LABS:  08-03    139  |  98  |  16  ----------------------------<  58<L>  4.8   |  26  |  3.15<H>    Ca    9.1      03 Aug 2024 05:48  Phos  3.0     08-03  Mg     2.70     08-03    TPro  8.1  /  Alb  3.4  /  TBili  0.4  /  DBili      /  AST  32  /  ALT  23  /  AlkPhos  108  08-03    Creatinine Trend: 3.15 <--, 4.56 <--, 2.98 <--, 4.10 <--, 3.94 <--, 3.21 <--, 2.42 <--, 4.93 <--    Phosphorus: 3.0 mg/dL (08-03 @ 05:48)  Albumin: 3.4 g/dL (08-03 @ 05:48)  Phosphorus: 4.3 mg/dL (08-02 @ 16:10)  Albumin: 3.3 g/dL (08-02 @ 16:10)                              9.4    3.15  )-----------( 206      ( 02 Aug 2024 16:10 )             30.2     Urine Studies:  Urinalysis - [08-03-24 @ 05:48]      Color  / Appearance  / SG  / pH       Gluc 58 / Ketone   / Bili  / Urobili        Blood  / Protein  / Leuk Est  / Nitrite       RBC  / WBC  / Hyaline  / Gran  / Sq Epi  / Non Sq Epi  / Bacteria       Iron 42, TIBC 127, %sat 33      [05-11-24 @ 07:26]  Ferritin 1680      [05-11-24 @ 07:26]  PTH -- (Ca --)      [04-28-24 @ 05:00]   155  TSH 1.45      [07-31-24 @ 07:45]  Lipid: chol 119, TG 66, HDL 46, LDL --      [10-07-23 @ 09:30]        RADIOLOGY & ADDITIONAL STUDIES:

## 2024-08-03 NOTE — PROGRESS NOTE ADULT - SUBJECTIVE AND OBJECTIVE BOX
[facility] Division of Hospital Medicine  Jennifer Blackwood MD  Pager [phone]    Patient is a 69y old  Male who presents with a chief complaint of expressive aphasia       SUBJECTIVE / OVERNIGHT EVENTS: pt seen earlier this AM; slow to respond to questions but answers with clear voice; offers no complaints      MEDICATIONS  (STANDING):  aspirin enteric coated 81 milliGRAM(s) Oral daily  atorvastatin 40 milliGRAM(s) Oral at bedtime  carvedilol 3.125 milliGRAM(s) Oral every 12 hours  chlorhexidine 2% Cloths 1 Application(s) Topical daily  dextrose 5%. 1000 milliLiter(s) (100 mL/Hr) IV Continuous <Continuous>  dextrose 5%. 1000 milliLiter(s) (50 mL/Hr) IV Continuous <Continuous>  dextrose 50% Injectable 25 Gram(s) IV Push once  dextrose 50% Injectable 25 Gram(s) IV Push once  dextrose 50% Injectable 12.5 Gram(s) IV Push once  epoetin dinah (EPOGEN) Injectable 80407 Unit(s) IV Push <User Schedule>  glucagon  Injectable 1 milliGRAM(s) IntraMuscular once  heparin   Injectable 5000 Unit(s) SubCutaneous every 12 hours  insulin lispro (ADMELOG) corrective regimen sliding scale   SubCutaneous at bedtime  insulin lispro (ADMELOG) corrective regimen sliding scale   SubCutaneous three times a day with meals  latanoprost 0.005% Ophthalmic Solution 1 Drop(s) Both EYES at bedtime  mirtazapine 7.5 milliGRAM(s) Oral daily  mupirocin 2% Ointment 1 Application(s) Both Nostrils two times a day  pantoprazole  Injectable 40 milliGRAM(s) IV Push daily  prednisoLONE acetate 1% Suspension 1 Drop(s) Both EYES two times a day    MEDICATIONS  (PRN):  dextrose Oral Gel 15 Gram(s) Oral once PRN Blood Glucose LESS THAN 70 milliGRAM(s)/deciliter  sodium chloride 0.9% Bolus. 100 milliLiter(s) IV Bolus every 5 minutes PRN SBP LESS THAN or EQUAL to 80 mmHg      CAPILLARY BLOOD GLUCOSE  POCT Blood Glucose.: 68 mg/dL (03 Aug 2024 08:25)  POCT Blood Glucose.: 72 mg/dL (03 Aug 2024 08:25)  POCT Blood Glucose.: 163 mg/dL (02 Aug 2024 22:52)  POCT Blood Glucose.: 178 mg/dL (02 Aug 2024 22:47)  POCT Blood Glucose.: 67 mg/dL (02 Aug 2024 22:24)  POCT Blood Glucose.: 66 mg/dL (02 Aug 2024 22:22)  POCT Blood Glucose.: 86 mg/dL (02 Aug 2024 18:50)  POCT Blood Glucose.: 102 mg/dL (02 Aug 2024 15:33)      PHYSICAL EXAM:  Vital Signs Last 24 Hrs  T(F): 98.7 (03 Aug 2024 09:00), Max: 98.7 (03 Aug 2024 09:00)  HR: 70 (03 Aug 2024 09:00) (65 - 76)  BP: 155/66 (03 Aug 2024 09:00) (140/59 - 155/73)  RR: 18 (03 Aug 2024 09:00) (16 - 18)  SpO2: 93% (03 Aug 2024 09:00) (93% - 100%)    Parameters below as of 03 Aug 2024 09:00  Patient On (Oxygen Delivery Method): room air        CONSTITUTIONAL: NAD, thin  EYES: PERRLA; conjunctiva and sclera clear  ENMT: Moist oral mucosa; normal dentition  RESPIRATORY: Normal respiratory effort; grossly b/l AE  CARDIOVASCULAR: Regular rate and rhythm; No lower extremity edema  ABDOMEN: Nontender to palpation, normoactive bowel sounds  MUSCULOSKELETAL:  no clubbing or cyanosis of digits; no joint swelling or tenderness to palpation  PSYCH: calm, coop; affect appropriate  NEUROLOGY: slow, clear speech  SKIN: No rashes; no palpable lesions    LABS:                        9.4    3.15  )-----------( 206      ( 02 Aug 2024 16:10 )             30.2     08-03    139  |  98  |  16  ----------------------------<  58<L>  4.8   |  26  |  3.15<H>    Ca    9.1      03 Aug 2024 05:48  Phos  3.0     08-03  Mg     2.70     08-03    TPro  8.1  /  Alb  3.4  /  TBili  0.4  /  DBili  x   /  AST  32  /  ALT  23  /  AlkPhos  108  08-03

## 2024-08-03 NOTE — PROGRESS NOTE ADULT - ASSESSMENT
69 year old M hx of ESRD on HD left AVF MWFr, CVA w/ gliosis, BKA/AKA functional quadriplegia, CAD, HTN, HLD, sent from dialysis unit for AMS. nephrology consulted for dialysis needs    ESRD:  On HD TIW via [facility].  Schedule is MWF. Consent obtained and charted from HCP Ramonita Vincent [phone]  s/p hd 7/31 uf 2L  s/p hd 8/2 uf 2.5L  - Renal diet.    AMS  s/p stroke code  f/u neuro  seems better today    anemia  below goal   ct head done without acute finding  epo with hd  monitor    htn  acceptable  monitor    hypophosphatemia  not on binder  monitor

## 2024-08-03 NOTE — PROGRESS NOTE ADULT - PROBLEM SELECTOR PLAN 6
F- restrict if able to resume diet  E-replete prn  N-pureed with mod thick liquids--will encourage and confirm with daughter re his baseline mental status; patient seems to be improving.  heparin subQ  Dispo to new LTC

## 2024-08-03 NOTE — PROGRESS NOTE ADULT - PROBLEM SELECTOR PLAN 3
-as above; trops flat however likely in the setting of ESRD. Stable EKG. Clinical picture dry/euvolemic.   -AVF site non infected; palpable thrill  -Patient oliguric  -daily mag and phos--will replete PRN  -HD MWF.  fluid mgmt via HD as able

## 2024-08-03 NOTE — CHART NOTE - NSCHARTNOTEFT_GEN_A_CORE
RRT called for difficulty arousing patient and non responsive to sternal rub.  upon entry into room rrt team at bedside fs 108 vitals 144/62 hr 68 temp 96.6 sat 100%. baseline a&o 1-2 with dementia, slow to respond, eyes open, able to answer simple questions re thirst and if he felt ok.  rrt called as back to his baseline. attending aware - changed remeron to qhs starting serenity as he received a dose this am.

## 2024-08-04 LAB
ANION GAP SERPL CALC-SCNC: 16 MMOL/L — HIGH (ref 7–14)
ANION GAP SERPL CALC-SCNC: 20 MMOL/L — HIGH (ref 7–14)
BUN SERPL-MCNC: 22 MG/DL — SIGNIFICANT CHANGE UP (ref 7–23)
BUN SERPL-MCNC: 26 MG/DL — HIGH (ref 7–23)
CALCIUM SERPL-MCNC: 9.4 MG/DL — SIGNIFICANT CHANGE UP (ref 8.4–10.5)
CALCIUM SERPL-MCNC: 9.4 MG/DL — SIGNIFICANT CHANGE UP (ref 8.4–10.5)
CHLORIDE SERPL-SCNC: 94 MMOL/L — LOW (ref 98–107)
CHLORIDE SERPL-SCNC: 96 MMOL/L — LOW (ref 98–107)
CO2 SERPL-SCNC: 22 MMOL/L — SIGNIFICANT CHANGE UP (ref 22–31)
CO2 SERPL-SCNC: 28 MMOL/L — SIGNIFICANT CHANGE UP (ref 22–31)
CREAT SERPL-MCNC: 4.19 MG/DL — HIGH (ref 0.5–1.3)
CREAT SERPL-MCNC: 4.27 MG/DL — HIGH (ref 0.5–1.3)
EGFR: 14 ML/MIN/1.73M2 — LOW
EGFR: 15 ML/MIN/1.73M2 — LOW
GLUCOSE BLDC GLUCOMTR-MCNC: 104 MG/DL — HIGH (ref 70–99)
GLUCOSE BLDC GLUCOMTR-MCNC: 104 MG/DL — HIGH (ref 70–99)
GLUCOSE BLDC GLUCOMTR-MCNC: 118 MG/DL — HIGH (ref 70–99)
GLUCOSE BLDC GLUCOMTR-MCNC: 55 MG/DL — LOW (ref 70–99)
GLUCOSE BLDC GLUCOMTR-MCNC: 62 MG/DL — LOW (ref 70–99)
GLUCOSE BLDC GLUCOMTR-MCNC: 64 MG/DL — LOW (ref 70–99)
GLUCOSE BLDC GLUCOMTR-MCNC: 69 MG/DL — LOW (ref 70–99)
GLUCOSE BLDC GLUCOMTR-MCNC: 75 MG/DL — SIGNIFICANT CHANGE UP (ref 70–99)
GLUCOSE BLDC GLUCOMTR-MCNC: 80 MG/DL — SIGNIFICANT CHANGE UP (ref 70–99)
GLUCOSE SERPL-MCNC: 48 MG/DL — CRITICAL LOW (ref 70–99)
GLUCOSE SERPL-MCNC: 78 MG/DL — SIGNIFICANT CHANGE UP (ref 70–99)
MAGNESIUM SERPL-MCNC: 2.8 MG/DL — HIGH (ref 1.6–2.6)
MAGNESIUM SERPL-MCNC: 3 MG/DL — HIGH (ref 1.6–2.6)
PHOSPHATE SERPL-MCNC: 3.4 MG/DL — SIGNIFICANT CHANGE UP (ref 2.5–4.5)
PHOSPHATE SERPL-MCNC: 3.5 MG/DL — SIGNIFICANT CHANGE UP (ref 2.5–4.5)
POTASSIUM SERPL-MCNC: 4.9 MMOL/L — SIGNIFICANT CHANGE UP (ref 3.5–5.3)
POTASSIUM SERPL-MCNC: 5.6 MMOL/L — HIGH (ref 3.5–5.3)
POTASSIUM SERPL-SCNC: 4.9 MMOL/L — SIGNIFICANT CHANGE UP (ref 3.5–5.3)
POTASSIUM SERPL-SCNC: 5.6 MMOL/L — HIGH (ref 3.5–5.3)
SODIUM SERPL-SCNC: 136 MMOL/L — SIGNIFICANT CHANGE UP (ref 135–145)
SODIUM SERPL-SCNC: 140 MMOL/L — SIGNIFICANT CHANGE UP (ref 135–145)

## 2024-08-04 PROCEDURE — 99232 SBSQ HOSP IP/OBS MODERATE 35: CPT

## 2024-08-04 RX ORDER — DEXTROSE 4 G
25 TABLET,CHEWABLE ORAL ONCE
Refills: 0 | Status: COMPLETED | OUTPATIENT
Start: 2024-08-04 | End: 2024-08-04

## 2024-08-04 RX ORDER — DEXTROSE 4 G
15 TABLET,CHEWABLE ORAL ONCE
Refills: 0 | Status: COMPLETED | OUTPATIENT
Start: 2024-08-04 | End: 2024-08-04

## 2024-08-04 RX ADMIN — Medication 3.12 MILLIGRAM(S): at 17:18

## 2024-08-04 RX ADMIN — PANTOPRAZOLE SODIUM 40 MILLIGRAM(S): 20 TABLET, DELAYED RELEASE ORAL at 11:43

## 2024-08-04 RX ADMIN — MUPIROCIN CALCIUM 1 APPLICATION(S): 20 CREAM TOPICAL at 17:17

## 2024-08-04 RX ADMIN — HEPARIN SODIUM 5000 UNIT(S): 1000 INJECTION, SOLUTION INTRAVENOUS; SUBCUTANEOUS at 05:17

## 2024-08-04 RX ADMIN — Medication 1 DROP(S): at 21:30

## 2024-08-04 RX ADMIN — PREDNISOLONE ACETATE 1 DROP(S): 10 SUSPENSION/ DROPS OPHTHALMIC at 05:17

## 2024-08-04 RX ADMIN — Medication 3.12 MILLIGRAM(S): at 05:16

## 2024-08-04 RX ADMIN — MUPIROCIN CALCIUM 1 APPLICATION(S): 20 CREAM TOPICAL at 05:17

## 2024-08-04 RX ADMIN — Medication 81 MILLIGRAM(S): at 11:44

## 2024-08-04 RX ADMIN — PREDNISOLONE ACETATE 1 DROP(S): 10 SUSPENSION/ DROPS OPHTHALMIC at 17:18

## 2024-08-04 RX ADMIN — Medication 15 GRAM(S): at 09:11

## 2024-08-04 RX ADMIN — HEPARIN SODIUM 5000 UNIT(S): 1000 INJECTION, SOLUTION INTRAVENOUS; SUBCUTANEOUS at 17:17

## 2024-08-04 RX ADMIN — CHLORHEXIDINE GLUCONATE 1 APPLICATION(S): 500 CLOTH TOPICAL at 11:42

## 2024-08-04 NOTE — PROVIDER CONTACT NOTE (OTHER) - RECOMMENDATIONS
ACP Jayshree Shepard was notified, said retry dysphagia screen with morning meds
Provider made aware
Provider made aware
provider Notified
continue to increase PO intake and check finger stick until above 100

## 2024-08-04 NOTE — PROVIDER CONTACT NOTE (HYPOGLYCEMIA EVENT) - NS PROVIDER CONTACT ASSESS-HYPO
patient alert and oriented to baseline A&Ox1
patient is resting in bed asymptomatic will recheck sugar every 15 minutes until above 100

## 2024-08-04 NOTE — PROGRESS NOTE ADULT - SUBJECTIVE AND OBJECTIVE BOX
[facility] Division of Hospital Medicine  Jennifer Blackwood MD  Pager [phone]    Patient is a 69y old  Male who presents with a chief complaint of expressive aphasia       SUBJECTIVE / OVERNIGHT EVENTS: PM events noted; greeting pt this AM and he had prompt response; offers no complaints      MEDICATIONS  (STANDING):  aspirin enteric coated 81 milliGRAM(s) Oral daily  atorvastatin 40 milliGRAM(s) Oral at bedtime  carvedilol 3.125 milliGRAM(s) Oral every 12 hours  chlorhexidine 2% Cloths 1 Application(s) Topical daily  dextrose 5%. 1000 milliLiter(s) (50 mL/Hr) IV Continuous <Continuous>  dextrose 5%. 1000 milliLiter(s) (100 mL/Hr) IV Continuous <Continuous>  dextrose 50% Injectable 12.5 Gram(s) IV Push once  dextrose 50% Injectable 25 Gram(s) IV Push once  dextrose 50% Injectable 25 Gram(s) IV Push once  epoetin dinah (EPOGEN) Injectable 42579 Unit(s) IV Push <User Schedule>  glucagon  Injectable 1 milliGRAM(s) IntraMuscular once  heparin   Injectable 5000 Unit(s) SubCutaneous every 12 hours  insulin lispro (ADMELOG) corrective regimen sliding scale   SubCutaneous at bedtime  insulin lispro (ADMELOG) corrective regimen sliding scale   SubCutaneous three times a day with meals  latanoprost 0.005% Ophthalmic Solution 1 Drop(s) Both EYES at bedtime  mirtazapine 7.5 milliGRAM(s) Oral at bedtime  mupirocin 2% Ointment 1 Application(s) Both Nostrils two times a day  pantoprazole  Injectable 40 milliGRAM(s) IV Push daily  prednisoLONE acetate 1% Suspension 1 Drop(s) Both EYES two times a day    MEDICATIONS  (PRN):  dextrose Oral Gel 15 Gram(s) Oral once PRN Blood Glucose LESS THAN 70 milliGRAM(s)/deciliter  sodium chloride 0.9% Bolus. 100 milliLiter(s) IV Bolus every 5 minutes PRN SBP LESS THAN or EQUAL to 80 mmHg      CAPILLARY BLOOD GLUCOSE  POCT Blood Glucose.: 104 mg/dL (04 Aug 2024 11:45)  POCT Blood Glucose.: 104 mg/dL (04 Aug 2024 11:18)  POCT Blood Glucose.: 69 mg/dL (04 Aug 2024 10:23)  POCT Blood Glucose.: 75 mg/dL (04 Aug 2024 09:31)  POCT Blood Glucose.: 62 mg/dL (04 Aug 2024 08:58)  POCT Blood Glucose.: 64 mg/dL (04 Aug 2024 08:40)  POCT Blood Glucose.: 55 mg/dL (04 Aug 2024 08:38)  POCT Blood Glucose.: 81 mg/dL (03 Aug 2024 21:12)  POCT Blood Glucose.: 105 mg/dL (03 Aug 2024 17:05)  POCT Blood Glucose.: 75 mg/dL (03 Aug 2024 12:32)    I&O's Summary      PHYSICAL EXAM:  Vital Signs Last 24 Hrs  T(F): 97 (04 Aug 2024 09:15), Max: 98 (03 Aug 2024 21:15)  HR: 65 (04 Aug 2024 09:15) (65 - 82)  BP: 153/70 (04 Aug 2024 09:15) (144/62 - 159/64)  RR: 18 (04 Aug 2024 09:15) (18 - 18)  SpO2: 98% (04 Aug 2024 09:15) (97% - 100%)    Parameters below as of 04 Aug 2024 09:15  Patient On (Oxygen Delivery Method): room air        CONSTITUTIONAL: NAD, well-developed, well-groomed  EYES: PERRLA; conjunctiva and sclera clear  ENMT: Moist oral mucosa; normal dentition  RESPIRATORY: Normal respiratory effort; grossly b/l AE  CARDIOVASCULAR: Regular rate and rhythm; No lower extremity edema  ABDOMEN: Nontender to palpation, normoactive bowel sounds  MUSCULOSKELETAL: no clubbing or cyanosis of digits; no joint swelling or tenderness to palpation; R BKA; L AKA  PSYCH: calm, coop; affect appropriate  NEUROLOGY: CN 2-12 are intact and symmetric; no gross sensory deficits   SKIN: No rashes; no palpable lesions    LABS:                        9.4    3.15  )-----------( 206      ( 02 Aug 2024 16:10 )             30.2     08-04    140  |  96<L>  |  26<H>  ----------------------------<  78  4.9   |  28  |  4.27<H>    Ca    9.4      04 Aug 2024 10:31  Phos  3.5     08-04  Mg     3.00     08-04

## 2024-08-04 NOTE — PROVIDER CONTACT NOTE (OTHER) - DATE AND TIME:
04-Aug-2024 09:11
30-Jul-2024 06:20
30-Jul-2024 01:00
01-Aug-2024 02:08
30-Jul-2024 08:27
04-Aug-2024 19:30

## 2024-08-04 NOTE — PROVIDER CONTACT NOTE (HYPOGLYCEMIA EVENT) - NS PROVIDER CONTACT BACKGROUND-HYPO
Age: 69y    Gender: Male    POCT Blood Glucose:  133 mg/dL (07-31-24 @ 10:17)  58 mg/dL (07-31-24 @ 09:46)  55 mg/dL (07-31-24 @ 09:44)  384 mg/dL (07-31-24 @ 06:02)  117 mg/dL (07-31-24 @ 00:15)  81 mg/dL (07-30-24 @ 16:48)  153 mg/dL (07-30-24 @ 12:14)      eMAR:  dextrose 50% Injectable   25 milliLiter(s) IV Push (07-30-24 @ 11:51)    dextrose 50% Injectable   12.5 Gram(s) IV Push (07-31-24 @ 09:57)    insulin lispro (ADMELOG) corrective regimen sliding scale   5 Unit(s) SubCutaneous (07-31-24 @ 06:09)    
Age: 69y    Gender: Male    POCT Blood Glucose:  62 mg/dL (08-04-24 @ 08:58)  64 mg/dL (08-04-24 @ 08:40)  55 mg/dL (08-04-24 @ 08:38)  81 mg/dL (08-03-24 @ 21:12)  105 mg/dL (08-03-24 @ 17:05)  75 mg/dL (08-03-24 @ 12:32)      eMAR:atorvastatin   40 milliGRAM(s) Oral (08-03-24 @ 21:26)

## 2024-08-04 NOTE — PROGRESS NOTE ADULT - PROBLEM SELECTOR PLAN 6
F- restrict if able to resume diet  E-replete prn  N-diet minced and moist; dc'd NCS to liberalize diet as pt with hypoglycemia or running low normal  heparin subQ  Dispo to new LTC

## 2024-08-04 NOTE — PROGRESS NOTE ADULT - SUBJECTIVE AND OBJECTIVE BOX
[facility]   MD MARIBELL CARRION MD RUORU WONG, PA    TEL:  OFFICE: [phone]    RENAL FOLLOW UP NOTE-- Date of Service ;; 08-04-24 @ 12:28  --------------------------------------------------------------------------------  HPI:  Pt seen and examined at bedside          PAST HISTORY  --------------------------------------------------------------------------------  No significant changes to PMH, PSH, FHx, SHx, unless otherwise noted    ALLERGIES & MEDICATIONS  --------------------------------------------------------------------------------  Allergies    No Known Allergies    Intolerances      Standing Inpatient Medications  aspirin enteric coated 81 milliGRAM(s) Oral daily  atorvastatin 40 milliGRAM(s) Oral at bedtime  carvedilol 3.125 milliGRAM(s) Oral every 12 hours  chlorhexidine 2% Cloths 1 Application(s) Topical daily  dextrose 5%. 1000 milliLiter(s) IV Continuous <Continuous>  dextrose 5%. 1000 milliLiter(s) IV Continuous <Continuous>  dextrose 50% Injectable 12.5 Gram(s) IV Push once  dextrose 50% Injectable 25 Gram(s) IV Push once  dextrose 50% Injectable 25 Gram(s) IV Push once  epoetin dinah (EPOGEN) Injectable 09032 Unit(s) IV Push <User Schedule>  glucagon  Injectable 1 milliGRAM(s) IntraMuscular once  heparin   Injectable 5000 Unit(s) SubCutaneous every 12 hours  insulin lispro (ADMELOG) corrective regimen sliding scale   SubCutaneous three times a day with meals  insulin lispro (ADMELOG) corrective regimen sliding scale   SubCutaneous at bedtime  latanoprost 0.005% Ophthalmic Solution 1 Drop(s) Both EYES at bedtime  mirtazapine 7.5 milliGRAM(s) Oral at bedtime  mupirocin 2% Ointment 1 Application(s) Both Nostrils two times a day  pantoprazole  Injectable 40 milliGRAM(s) IV Push daily  prednisoLONE acetate 1% Suspension 1 Drop(s) Both EYES two times a day    PRN Inpatient Medications  dextrose Oral Gel 15 Gram(s) Oral once PRN  sodium chloride 0.9% Bolus. 100 milliLiter(s) IV Bolus every 5 minutes PRN      REVIEW OF SYSTEMS  --------------------------------------------------------------------------------  General: no fever  CVS: no chest pain  RESP: no sob, no cough  ABD: no abdominal pain  : no dysuria,  MSK: no edema     VITALS/PHYSICAL EXAM  --------------------------------------------------------------------------------  T(C): 36.1 (08-04-24 @ 09:15), Max: 36.7 (08-03-24 @ 21:15)  HR: 65 (08-04-24 @ 09:15) (65 - 82)  BP: 153/70 (08-04-24 @ 09:15) (144/62 - 159/64)  RR: 18 (08-04-24 @ 09:15) (18 - 18)  SpO2: 98% (08-04-24 @ 09:15) (97% - 100%)  Wt(kg): --        Physical Exam:  	Gen: NAD  	HEENT: MMM  	Pulm: CTA B/L  	CV: S1S2  	Abd: Soft, +BS  	Ext: No LE edema B/L. POLLY LOVELL                      Neuro: Awake  	Skin: Warm and Dry   	Vascular access: LAVF            LABS/STUDIES  --------------------------------------------------------------------------------              9.4    3.15  >-----------<  206      [08-02-24 @ 16:10]              30.2     140  |  96  |  26  ----------------------------<  78      [08-04-24 @ 10:31]  4.9   |  28  |  4.27        Ca     9.4     [08-04-24 @ 10:31]      Mg     3.00     [08-04-24 @ 10:31]      Phos  3.5     [08-04-24 @ 10:31]    TPro  8.1  /  Alb  3.4  /  TBili  0.4  /  DBili  x   /  AST  32  /  ALT  23  /  AlkPhos  108  [08-03-24 @ 05:48]          Creatinine Trend:  SCr 4.27 [08-04 @ 10:31]  SCr 4.19 [08-04 @ 07:00]  SCr 3.15 [08-03 @ 05:48]  SCr 4.56 [08-02 @ 16:10]  SCr 2.98 [08-01 @ 06:20]    Urinalysis - [08-04-24 @ 10:31]      Color  / Appearance  / SG  / pH       Gluc 78 / Ketone   / Bili  / Urobili        Blood  / Protein  / Leuk Est  / Nitrite       RBC  / WBC  / Hyaline  / Gran  / Sq Epi  / Non Sq Epi  / Bacteria       Iron 42, TIBC 127, %sat 33      [05-11-24 @ 07:26]  Ferritin 1680      [05-11-24 @ 07:26]  PTH -- (Ca --)      [04-28-24 @ 05:00]   155  TSH 1.45      [07-31-24 @ 07:45]  Lipid: chol 119, TG 66, HDL 46, LDL --      [10-07-23 @ 09:30]    HBsAb 49.7      [08-02-24 @ 16:10]  HBsAg Nonreact      [08-02-24 @ 16:10]  HBcAb Nonreact      [08-02-24 @ 16:10]  HCV 0.12, Nonreact      [08-02-24 @ 16:10]

## 2024-08-04 NOTE — PROGRESS NOTE ADULT - PROBLEM SELECTOR PLAN 1
unclear etiology--previously with similar admission.  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    infectious w/u unremarkable; abx stopped    recent MR and EEG negative two weeks ago. Clinical presentation and w/u not consistent with acute ischemic stroke.  -EEG reading non specific however Patient mental status improving. No need for further MRI  back to baseline; post event 8/3, remeron changed to bedtime; monitor for further events

## 2024-08-04 NOTE — PROVIDER CONTACT NOTE (OTHER) - BACKGROUND
Patient admitted for altered mental status
patient admitted for AMS history of ESRD, dementia. A&ox1-2 at baseline
Patient admitted for altered mental status
pt admitted by alter mental status
PMH DM, HTN, CKD, kidney disease
patient refused dysphagia screen wouldn't take any sips of water

## 2024-08-04 NOTE — PROVIDER CONTACT NOTE (OTHER) - REASON
Patient blood sugar dropped from 89(00:53) to 81(05:55)
dysphagia screen
hypoglycemic blood glucose
patient refusing night time meds
critical Value Calcium 6.5, Phos 0.5, Glucose 982
Patient blood sugar dropped from 140(@21:20) to 89(00:53)

## 2024-08-04 NOTE — PROVIDER CONTACT NOTE (OTHER) - NAME OF MD/NP/PA/DO NOTIFIED:
john hodgson
EMILIA Shepard
Mary Jones ACP
[facility] [id number]
EMILIA Gallardo
ACP [id number] [address]

## 2024-08-04 NOTE — PROGRESS NOTE ADULT - ASSESSMENT
69 year old M hx of ESRD on HD left AVF MWFr, CVA w/ gliosis, BKA/AKA functional quadriplegia, CAD, HTN, HLD, sent from dialysis unit for AMS. nephrology consulted for dialysis needs    ESRD:  On HD TIW via [facility].  Schedule is MWF. Consent obtained and charted from HCP Ramonita Vincent [phone]  s/p hd 8/2 uf 2.5L  Next HD 8/5/24  - Renal diet.    AMS  s/p stroke code  f/u neuro  seems better     anemia  below goal   ct head done without acute finding  epo with hd  monitor    htn  acceptable  monitor    Hyperkalemia  Specimen was hemolyzed  Repeat K normal  Monitor    hypophosphatemia  Improved  not on binder  monitor    Hypermagnesemia  Avoid food rich in magnesium  Monitor

## 2024-08-04 NOTE — PROVIDER CONTACT NOTE (OTHER) - ACTION/TREATMENT ORDERED:
stat repeat BMP, CBC order and FS,, will continue monitoring.
follow hypoglycemic protocol
provider aware and will come to floor to talk to patient
ACP Jayshree Shepard was notified, said retry dysphagia screen with morning meds
Per provider continue to monitor patients blood sugar Q6
Per provider continue to monitor patients blood sugar Q6

## 2024-08-04 NOTE — PROVIDER CONTACT NOTE (OTHER) - ASSESSMENT
patient denies chest pain, shortness of breath, headache, dizziness, blurry vision; no change in mentation.
patient is resting in bed
Patient A&Ox1. No acute distress noted
Patient A&Ox1. No acute distress noted
patient has no s/s of distress. vital signs stable

## 2024-08-04 NOTE — PROVIDER CONTACT NOTE (OTHER) - SITUATION
critical Value Calcium 6.5, Phos 0.5, Glucose 982
patient is refusing nighttime atorvastatin and mirtazapine
Patient blood sugar dropped from  89(00:53) to 81 (05:55)
patient finger stick checked before breakfast 55, repeated 64, breakfast and apple juice given, rechecked 62. dextrose gel given. morning labs resulted BG 48, result read back from ERIC Rodriguez
Patient blood sugar dropped from 140(@21:20) to 89(00:53)
patient refused dysphagia screen wouldn't take any sips of water

## 2024-08-05 LAB
ANION GAP SERPL CALC-SCNC: 12 MMOL/L — SIGNIFICANT CHANGE UP (ref 7–14)
BUN SERPL-MCNC: 32 MG/DL — HIGH (ref 7–23)
CALCIUM SERPL-MCNC: 9 MG/DL — SIGNIFICANT CHANGE UP (ref 8.4–10.5)
CHLORIDE SERPL-SCNC: 97 MMOL/L — LOW (ref 98–107)
CO2 SERPL-SCNC: 28 MMOL/L — SIGNIFICANT CHANGE UP (ref 22–31)
CREAT SERPL-MCNC: 5.31 MG/DL — HIGH (ref 0.5–1.3)
EGFR: 11 ML/MIN/1.73M2 — LOW
GLUCOSE BLDC GLUCOMTR-MCNC: 119 MG/DL — HIGH (ref 70–99)
GLUCOSE BLDC GLUCOMTR-MCNC: 188 MG/DL — HIGH (ref 70–99)
GLUCOSE BLDC GLUCOMTR-MCNC: 72 MG/DL — SIGNIFICANT CHANGE UP (ref 70–99)
GLUCOSE BLDC GLUCOMTR-MCNC: 88 MG/DL — SIGNIFICANT CHANGE UP (ref 70–99)
GLUCOSE BLDC GLUCOMTR-MCNC: 93 MG/DL — SIGNIFICANT CHANGE UP (ref 70–99)
GLUCOSE BLDC GLUCOMTR-MCNC: 94 MG/DL — SIGNIFICANT CHANGE UP (ref 70–99)
GLUCOSE BLDC GLUCOMTR-MCNC: 98 MG/DL — SIGNIFICANT CHANGE UP (ref 70–99)
GLUCOSE SERPL-MCNC: 90 MG/DL — SIGNIFICANT CHANGE UP (ref 70–99)
HCT VFR BLD CALC: 32.2 % — LOW (ref 39–50)
HGB BLD-MCNC: 10 G/DL — LOW (ref 13–17)
MAGNESIUM SERPL-MCNC: 2.9 MG/DL — HIGH (ref 1.6–2.6)
MCHC RBC-ENTMCNC: 26.7 PG — LOW (ref 27–34)
MCHC RBC-ENTMCNC: 31.1 GM/DL — LOW (ref 32–36)
MCV RBC AUTO: 86.1 FL — SIGNIFICANT CHANGE UP (ref 80–100)
NRBC # BLD: 0 /100 WBCS — SIGNIFICANT CHANGE UP (ref 0–0)
NRBC # FLD: 0 K/UL — SIGNIFICANT CHANGE UP (ref 0–0)
PHOSPHATE SERPL-MCNC: 3.4 MG/DL — SIGNIFICANT CHANGE UP (ref 2.5–4.5)
PLATELET # BLD AUTO: 219 K/UL — SIGNIFICANT CHANGE UP (ref 150–400)
POTASSIUM SERPL-MCNC: 4.1 MMOL/L — SIGNIFICANT CHANGE UP (ref 3.5–5.3)
POTASSIUM SERPL-SCNC: 4.1 MMOL/L — SIGNIFICANT CHANGE UP (ref 3.5–5.3)
RBC # BLD: 3.74 M/UL — LOW (ref 4.2–5.8)
RBC # FLD: 21.2 % — HIGH (ref 10.3–14.5)
SODIUM SERPL-SCNC: 137 MMOL/L — SIGNIFICANT CHANGE UP (ref 135–145)
WBC # BLD: 3.71 K/UL — LOW (ref 3.8–10.5)
WBC # FLD AUTO: 3.71 K/UL — LOW (ref 3.8–10.5)

## 2024-08-05 PROCEDURE — 99232 SBSQ HOSP IP/OBS MODERATE 35: CPT | Mod: GC

## 2024-08-05 RX ORDER — DEXTROSE 4 G
25 TABLET,CHEWABLE ORAL ONCE
Refills: 0 | Status: COMPLETED | OUTPATIENT
Start: 2024-08-05 | End: 2024-08-05

## 2024-08-05 RX ADMIN — CHLORHEXIDINE GLUCONATE 1 APPLICATION(S): 500 CLOTH TOPICAL at 10:25

## 2024-08-05 RX ADMIN — PREDNISOLONE ACETATE 1 DROP(S): 10 SUSPENSION/ DROPS OPHTHALMIC at 16:45

## 2024-08-05 RX ADMIN — Medication 1 DROP(S): at 21:13

## 2024-08-05 RX ADMIN — Medication 3.12 MILLIGRAM(S): at 05:43

## 2024-08-05 RX ADMIN — HEPARIN SODIUM 5000 UNIT(S): 1000 INJECTION, SOLUTION INTRAVENOUS; SUBCUTANEOUS at 16:46

## 2024-08-05 RX ADMIN — Medication 25 MILLILITER(S): at 08:18

## 2024-08-05 RX ADMIN — PANTOPRAZOLE SODIUM 40 MILLIGRAM(S): 20 TABLET, DELAYED RELEASE ORAL at 10:26

## 2024-08-05 RX ADMIN — Medication 7.5 MILLIGRAM(S): at 21:13

## 2024-08-05 RX ADMIN — Medication 3.12 MILLIGRAM(S): at 16:45

## 2024-08-05 RX ADMIN — EPOETIN ALFA 10000 UNIT(S): 3000 SOLUTION INTRAVENOUS; SUBCUTANEOUS at 11:28

## 2024-08-05 RX ADMIN — PREDNISOLONE ACETATE 1 DROP(S): 10 SUSPENSION/ DROPS OPHTHALMIC at 04:42

## 2024-08-05 RX ADMIN — ATORVASTATIN CALCIUM 40 MILLIGRAM(S): 40 TABLET, FILM COATED ORAL at 21:13

## 2024-08-05 RX ADMIN — Medication 81 MILLIGRAM(S): at 10:26

## 2024-08-05 RX ADMIN — HEPARIN SODIUM 5000 UNIT(S): 1000 INJECTION, SOLUTION INTRAVENOUS; SUBCUTANEOUS at 04:42

## 2024-08-05 NOTE — PROGRESS NOTE ADULT - SUBJECTIVE AND OBJECTIVE BOX
Dottie Severino PGY1  pager [phone] or check resident tab for coverage    Patient is a 69y old  Male who presents with a chief complaint of expressive aphasia (04 Aug 2024 12:28)      SUBJECTIVE / OVERNIGHT EVENTS:    MEDICATIONS  (STANDING):  aspirin enteric coated 81 milliGRAM(s) Oral daily  atorvastatin 40 milliGRAM(s) Oral at bedtime  carvedilol 3.125 milliGRAM(s) Oral every 12 hours  chlorhexidine 2% Cloths 1 Application(s) Topical daily  dextrose 5%. 1000 milliLiter(s) (100 mL/Hr) IV Continuous <Continuous>  dextrose 5%. 1000 milliLiter(s) (50 mL/Hr) IV Continuous <Continuous>  dextrose 50% Injectable 25 Gram(s) IV Push once  dextrose 50% Injectable 25 Gram(s) IV Push once  dextrose 50% Injectable 12.5 Gram(s) IV Push once  epoetin dinah (EPOGEN) Injectable 76831 Unit(s) IV Push <User Schedule>  glucagon  Injectable 1 milliGRAM(s) IntraMuscular once  heparin   Injectable 5000 Unit(s) SubCutaneous every 12 hours  insulin lispro (ADMELOG) corrective regimen sliding scale   SubCutaneous three times a day with meals  insulin lispro (ADMELOG) corrective regimen sliding scale   SubCutaneous at bedtime  latanoprost 0.005% Ophthalmic Solution 1 Drop(s) Both EYES at bedtime  mirtazapine 7.5 milliGRAM(s) Oral at bedtime  pantoprazole  Injectable 40 milliGRAM(s) IV Push daily  prednisoLONE acetate 1% Suspension 1 Drop(s) Both EYES two times a day    MEDICATIONS  (PRN):  dextrose Oral Gel 15 Gram(s) Oral once PRN Blood Glucose LESS THAN 70 milliGRAM(s)/deciliter  sodium chloride 0.9% Bolus. 100 milliLiter(s) IV Bolus every 5 minutes PRN SBP LESS THAN or EQUAL to 80 mmHg      CAPILLARY BLOOD GLUCOSE      POCT Blood Glucose.: 72 mg/dL (05 Aug 2024 07:31)  POCT Blood Glucose.: 80 mg/dL (04 Aug 2024 22:26)  POCT Blood Glucose.: 118 mg/dL (04 Aug 2024 17:35)  POCT Blood Glucose.: 104 mg/dL (04 Aug 2024 11:45)  POCT Blood Glucose.: 104 mg/dL (04 Aug 2024 11:18)  POCT Blood Glucose.: 69 mg/dL (04 Aug 2024 10:23)  POCT Blood Glucose.: 75 mg/dL (04 Aug 2024 09:31)  POCT Blood Glucose.: 62 mg/dL (04 Aug 2024 08:58)  POCT Blood Glucose.: 64 mg/dL (04 Aug 2024 08:40)  POCT Blood Glucose.: 55 mg/dL (04 Aug 2024 08:38)    I&O's Summary      Vital Signs Last 24 Hrs  T(C): 36.5 (05 Aug 2024 04:45), Max: 36.6 (05 Aug 2024 00:00)  T(F): 97.7 (05 Aug 2024 04:45), Max: 97.9 (05 Aug 2024 00:00)  HR: 73 (05 Aug 2024 07:06) (65 - 75)  BP: 150/61 (05 Aug 2024 07:06) (125/72 - 153/70)  BP(mean): --  RR: 18 (05 Aug 2024 04:45) (18 - 18)  SpO2: 98% (05 Aug 2024 04:45) (98% - 99%)    Parameters below as of 05 Aug 2024 04:45  Patient On (Oxygen Delivery Method): room air        PHYSICAL EXAM:  GENERAL: no distress  PSYCH: A&O x3  HEAD: Atraumatic, Normocephalic  NECK: Supple, No JVD  CHEST/LUNG: clear to auscultation bilaterally  HEART: regular rate and rhythm, no murmurs  ABDOMEN: nontender to palpation, no rebound tenderness/guarding  EXTREMITIES: no edema on bilateral LE  NEUROLOGY: no focal neurologic deficit  SKIN: No rashes or lesions    LABS:     08-04    140  |  96<L>  |  26<H>  ----------------------------<  78  4.9   |  28  |  4.27<H>    Ca    9.4      04 Aug 2024 10:31  Phos  3.5     08-04  Mg     3.00     08-04            Urinalysis Basic - ( 04 Aug 2024 10:31 )    Color: x / Appearance: x / SG: x / pH: x  Gluc: 78 mg/dL / Ketone: x  / Bili: x / Urobili: x   Blood: x / Protein: x / Nitrite: x   Leuk Esterase: x / RBC: x / WBC x   Sq Epi: x / Non Sq Epi: x / Bacteria: x        RADIOLOGY & ADDITIONAL TESTS:    Imaging Personally Reviewed:    Consultant(s) Notes Reviewed:      Care Discussed with Consultants/Other Providers:   Dottie Severino PGY3  pager [phone] or check resident tab for coverage    Patient is a 69y old  Male who presents with a chief complaint of expressive aphasia (04 Aug 2024 12:28)      SUBJECTIVE / OVERNIGHT EVENTS: NAONE. Patient doing well today. Answering questions as prompted. Denying any acute complaints. Denies f/c/n/v/d/c. Denies sob/cp.    MEDICATIONS  (STANDING):  aspirin enteric coated 81 milliGRAM(s) Oral daily  atorvastatin 40 milliGRAM(s) Oral at bedtime  carvedilol 3.125 milliGRAM(s) Oral every 12 hours  chlorhexidine 2% Cloths 1 Application(s) Topical daily  dextrose 5%. 1000 milliLiter(s) (100 mL/Hr) IV Continuous <Continuous>  dextrose 5%. 1000 milliLiter(s) (50 mL/Hr) IV Continuous <Continuous>  dextrose 50% Injectable 25 Gram(s) IV Push once  dextrose 50% Injectable 25 Gram(s) IV Push once  dextrose 50% Injectable 12.5 Gram(s) IV Push once  epoetin dinah (EPOGEN) Injectable 67225 Unit(s) IV Push <User Schedule>  glucagon  Injectable 1 milliGRAM(s) IntraMuscular once  heparin   Injectable 5000 Unit(s) SubCutaneous every 12 hours  insulin lispro (ADMELOG) corrective regimen sliding scale   SubCutaneous three times a day with meals  insulin lispro (ADMELOG) corrective regimen sliding scale   SubCutaneous at bedtime  latanoprost 0.005% Ophthalmic Solution 1 Drop(s) Both EYES at bedtime  mirtazapine 7.5 milliGRAM(s) Oral at bedtime  pantoprazole  Injectable 40 milliGRAM(s) IV Push daily  prednisoLONE acetate 1% Suspension 1 Drop(s) Both EYES two times a day    MEDICATIONS  (PRN):  dextrose Oral Gel 15 Gram(s) Oral once PRN Blood Glucose LESS THAN 70 milliGRAM(s)/deciliter  sodium chloride 0.9% Bolus. 100 milliLiter(s) IV Bolus every 5 minutes PRN SBP LESS THAN or EQUAL to 80 mmHg      CAPILLARY BLOOD GLUCOSE      POCT Blood Glucose.: 72 mg/dL (05 Aug 2024 07:31)  POCT Blood Glucose.: 80 mg/dL (04 Aug 2024 22:26)  POCT Blood Glucose.: 118 mg/dL (04 Aug 2024 17:35)  POCT Blood Glucose.: 104 mg/dL (04 Aug 2024 11:45)  POCT Blood Glucose.: 104 mg/dL (04 Aug 2024 11:18)  POCT Blood Glucose.: 69 mg/dL (04 Aug 2024 10:23)  POCT Blood Glucose.: 75 mg/dL (04 Aug 2024 09:31)  POCT Blood Glucose.: 62 mg/dL (04 Aug 2024 08:58)  POCT Blood Glucose.: 64 mg/dL (04 Aug 2024 08:40)  POCT Blood Glucose.: 55 mg/dL (04 Aug 2024 08:38)    I&O's Summary      Vital Signs Last 24 Hrs  T(C): 36.5 (05 Aug 2024 04:45), Max: 36.6 (05 Aug 2024 00:00)  T(F): 97.7 (05 Aug 2024 04:45), Max: 97.9 (05 Aug 2024 00:00)  HR: 73 (05 Aug 2024 07:06) (65 - 75)  BP: 150/61 (05 Aug 2024 07:06) (125/72 - 153/70)  BP(mean): --  RR: 18 (05 Aug 2024 04:45) (18 - 18)  SpO2: 98% (05 Aug 2024 04:45) (98% - 99%)    Parameters below as of 05 Aug 2024 04:45  Patient On (Oxygen Delivery Method): room air        PHYSICAL EXAM:  GENERAL: no distress  PSYCH: A&O x1-2  HEAD: Atraumatic, Normocephalic  NECK: Supple, No JVD  CHEST/LUNG: clear to auscultation bilaterally  HEART: regular rate and rhythm, no murmurs  ABDOMEN: nontender to palpation, no rebound tenderness/guarding  EXTREMITIES: no edema on bilateral LE, +R BKA +L AKA  NEUROLOGY: no focal neurologic deficit  SKIN: No rashes or lesions    LABS:     08-04    140  |  96<L>  |  26<H>  ----------------------------<  78  4.9   |  28  |  4.27<H>    Ca    9.4      04 Aug 2024 10:31  Phos  3.5     08-04  Mg     3.00     08-04            Urinalysis Basic - ( 04 Aug 2024 10:31 )    Color: x / Appearance: x / SG: x / pH: x  Gluc: 78 mg/dL / Ketone: x  / Bili: x / Urobili: x   Blood: x / Protein: x / Nitrite: x   Leuk Esterase: x / RBC: x / WBC x   Sq Epi: x / Non Sq Epi: x / Bacteria: x        RADIOLOGY & ADDITIONAL TESTS:    Imaging Personally Reviewed:    Consultant(s) Notes Reviewed:      Care Discussed with Consultants/Other Providers:

## 2024-08-05 NOTE — PROGRESS NOTE ADULT - SUBJECTIVE AND OBJECTIVE BOX
Neurology Progress Note    S: Patient seen and examined.     Medications: MEDICATIONS  (STANDING):  aspirin enteric coated 81 milliGRAM(s) Oral daily  atorvastatin 40 milliGRAM(s) Oral at bedtime  carvedilol 3.125 milliGRAM(s) Oral every 12 hours  chlorhexidine 2% Cloths 1 Application(s) Topical daily  dextrose 5%. 1000 milliLiter(s) (50 mL/Hr) IV Continuous <Continuous>  dextrose 5%. 1000 milliLiter(s) (100 mL/Hr) IV Continuous <Continuous>  dextrose 50% Injectable 12.5 Gram(s) IV Push once  dextrose 50% Injectable 25 Gram(s) IV Push once  dextrose 50% Injectable 25 Gram(s) IV Push once  epoetin dinah (EPOGEN) Injectable 42888 Unit(s) IV Push <User Schedule>  glucagon  Injectable 1 milliGRAM(s) IntraMuscular once  heparin   Injectable 5000 Unit(s) SubCutaneous every 12 hours  insulin lispro (ADMELOG) corrective regimen sliding scale   SubCutaneous three times a day with meals  insulin lispro (ADMELOG) corrective regimen sliding scale   SubCutaneous at bedtime  latanoprost 0.005% Ophthalmic Solution 1 Drop(s) Both EYES at bedtime  mirtazapine 7.5 milliGRAM(s) Oral at bedtime  pantoprazole  Injectable 40 milliGRAM(s) IV Push daily  prednisoLONE acetate 1% Suspension 1 Drop(s) Both EYES two times a day    MEDICATIONS  (PRN):  dextrose Oral Gel 15 Gram(s) Oral once PRN Blood Glucose LESS THAN 70 milliGRAM(s)/deciliter  sodium chloride 0.9% Bolus. 100 milliLiter(s) IV Bolus every 5 minutes PRN SBP LESS THAN or EQUAL to 80 mmHg       Vitals:  Vital Signs Last 24 Hrs  T(C): 36.4 (05 Aug 2024 10:40), Max: 36.6 (05 Aug 2024 00:00)  T(F): 97.5 (05 Aug 2024 10:40), Max: 97.9 (05 Aug 2024 00:00)  HR: 71 (05 Aug 2024 10:40) (66 - 75)  BP: 142/73 (05 Aug 2024 10:40) (125/72 - 151/68)  BP(mean): --  RR: 18 (05 Aug 2024 10:40) (18 - 18)  SpO2: 98% (05 Aug 2024 10:40) (98% - 99%)    Parameters below as of 05 Aug 2024 10:40  Patient On (Oxygen Delivery Method): room air                    General:  Constitutional: Male, appears stated age, in no apparent distress including pain  Head: Normocephalic & Atraumatic.  Respiratory: Breathing comfortably.    Neurological:  MS: Eyes closed, open to noxious stimuli, alert AAox2  Language: Speech is clearer today, still with mild dysarthria     CNs: PERRL (R = 3mm, L = 3mm). No blink to threat b/l; patient able to make out shapes, but not objects or count fingers. Dysconjugate gaze w/ exodeviation. No facial asymmetry initially, on reassessment smile is symmetric. Moderate to severe dysarthria.     Motor: Normal muscle bulk & tone. Bilateral UE drifts, R>L, however there appears to be intermittent loss of tone consistent with asterixis. 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     Sensation: Grimaces symmetrically to noxious stimuli b/l.     Coordination: unable to assess due to visual acuity baseline.     Gait: Patient unable to assess at baseline.       I personally reviewed the below data/images/labs:      LABS:                          10.0   3.71  )-----------( 219      ( 05 Aug 2024 10:30 )             32.2     08-05    137  |  97<L>  |  32<H>  ----------------------------<  90  4.1   |  28  |  5.31<H>    Ca    9.0      05 Aug 2024 10:30  Phos  3.4     08-05  Mg     2.90     08-05          Urinalysis Basic - ( 05 Aug 2024 10:30 )    Color: x / Appearance: x / SG: x / pH: x  Gluc: 90 mg/dL / Ketone: x  / Bili: x / Urobili: x   Blood: x / Protein: x / Nitrite: x   Leuk Esterase: x / RBC: x / WBC x   Sq Epi: x / Non Sq Epi: x / Bacteria: x            CTH/CTA/CTP:    There are chronic infarcts in both PCA territories and in the cerebellum   bilaterally, unchanged. There is also a chronic infarct at the left   superior frontal gyrus. There are small chronic white matter infarcts in   the right corona radiata and centrum semiovale, better seen on theprior   MRI.    A hyperdense mass is noted at the left CPA. There are dense   calcifications at the interhemispheric fissure and in the left middle   frontal gyrus. There are extensive vascular calcifications within the ECA   branches in the soft tissues of the scalp. Accounting for differences in   technique, all of these findings appear unchanged.    Moderate generalized cerebral volume loss, with distention of the sulci   and concomitant ex-vacuo ventricular dilatation. Moderate to severe   nonspecific low attenuation in the periventricular and subcortical white   matter, likely due to small vessel disease.    No acute intracranial hemorrhage. No midline shift or herniation.    The visualized sinuses and mastoids are clear. Left lens implant. Limited   views of the orbits and visualized soft tissues of the neck, face, scalp,   skull base, and calvarium are otherwise unremarkable.    Using a threshold of 30%, there is an rCBF defect in the right PCA   territory measuring 5 mL, corresponding to a chronic infarct on the CT.   Using a threshold of 6s, there is a matching Tmax abnormality in the   right PCA territory measuring 5 mL. No evidence of a perfusion mismatch   to suggest brain tissue at risk, although MRI would be more sensitive for   acute ischemia.    There is calcified atheromatous plaque at the great vessel origins, with   mild narrowing less than 10%. The left vertebral artery arises directly   from the arch and is congenitally hypoplastic. There is calcified plaque   at the origin of the dominant right vertebral artery and at the right   V2/V3 segment, with mild narrowing.    The carotid bifurcations are not well seen due to patient   motion/swallowing artifact. On the right, there is heavily calcified   plaque with 40-50% stenosis of the proximal right ICA. On the left, there   is calcified plaque at the carotid bifurcation and ICA origin with less   than 10% narrowing.    Otherwise, the right and left ICAs, CCAs, vertebrals, subclavians, and   the brachiocephalic artery are negative. No ulcerated plaque or arterial   dissection.    Intracranially, there is calcified plaque with moderate right and   moderate to severe left V4 vertebral segment stenoses. The left vertebral   artery probably ends in a PICA, without joining the basilar. The basilar   artery is diffusely hypoplastic and at least moderately stenosed.   Correlate clinically for vertebrobasilar insufficiency.    There is heavily calcified plaque with mild to moderate narrowing of the   C4-C6 ICA segments bilaterally. There is fetal origin of the right PCA.   There are mild to moderate stenotic lesions in the M1 segment of the   right MCA and in the PCOM segment of the fetal right PCA. There is   heavily calcified plaque with moderate tosevere narrowing in both A2   segments distally. There is adequate runoff, so this could be   artifactually accentuated.    No intracranial aneurysms or large vessel occlusions. No large feeding   arteries or draining veins. The ACAs, MCAs, PCAs, andcarotid siphons are   otherwise negative. The basilar artery and V4 vertebral segments are   otherwise negative. Abnormalities of  vessels and distal   intracranial branches are beyond the resolution of this technique, and   catheter angiography would be more sensitive.    The dural venous sinuses are grossly patent, although this examination   wasn't optimized to evaluate the cerebral veins. Mild degenerative   changes are noted in the cervical spine. No gross evidence of an acute   fracture, although this examination wasn't optimized to evaluate the   spine. There are bilateral pleural effusions, with passive atelectasis.   There is septal thickening at the lung apices.    IMPRESSION:    1.  No acute intracranial hemorrhage or acute retrievable clot.  2.  MRI would be more sensitive for acute ischemia.  3.  Stable chronic infarcts, senescent cerebral changes, and hyperdense   mass at the left CPA.  4.  Proximal right ICA stenosis, 40-50%.  5.  Multifocal stenotic lesions in the basilar artery and both V4   vertebral segments.  6.  Correlate clinically for vertebrobasilar insufficiency.  7.  Heavily calcified chronic plaque with moderate to severe bilateral   DENISE stenoses.        MRI brain w/w/o contrast (Epilepsy protocol) 7/2024:    1. Unchanged left-sided CP angle vestibular schwannoma.    2. Multiple unchanged chronic intracranial findings, as discussed.    3. Similar-appearing extensive chronic white matter microvascular type   changes.    4. No evidence of acute intracranial hemorrhage or acute cerebral   ischemia.    EEG x 24 hours 7/2024:    Mild bilateral temporal focal cerebral dysfunction can be structural or functional in etiology.  No epileptiform abnormalities or seizures.      EEG Classification / Summary:  Abnormal  EEG in the awake state  1. Mild generalized background slowing, of nonspecific etiology    Clinical Impression:  Mild diffuse cerebral dysfunction, which can indicate an encephalopathic process, including but not limited to toxic and metabolic.   There were no epileptiform abnormalities recorded.

## 2024-08-05 NOTE — PROGRESS NOTE ADULT - ASSESSMENT
69M with ESRD on HD left AVF MWFr, OU blindness with bilateral occipital lobes chronic infarcts (on ASA 81 mg qd), BKA/AKA functional quadriplegia, CAD, HTN, HLD, Dementia, recent admission for unresponsiveness in early 7/2024 found to be in urosepsis, presents to [facility] ED as code stroke for unresponsiveness. LKW approximately 12 pm.  Before receiving dialysis, patient was found to be unresponsive to voice so EMS was called.  NIHSS 13 initially  MRS 5  Now AAOx 2 on exam but lethargic  CTH with known chronic infarcts, L CPA mass   CTA with known posterior circulation stenosis   Deemed not tnk/thrombectomy candidate  EEG with generalized slowing, no seizures    Impression: Unresponsiveness followed by dysarthria, diaphoresis, asterixis more likely due to diffuse cerebral dysfunction from toxic metabolic and/or infectious etiologies    Recommendations     [] aspirin for seocndary stroke prevention  [] eeg without seizure , now off  [] as mental status improving, can hold off on MRI for now  [] infectious workup per primary team - off ABx  [] remeron changed to bedtime   [] C/w home Atorvastatin 40 mg qhs   [] Okay to resume other home medications including for BP if no other contraindications  [] Neurochecks: q4hr   [] BP goals: No need for permissive HTN. C/w home medications and follow long term management goals for HTN. Avoid hypotension (<90/60 mmHg) if possible.   [] PT/OT - back to nursing facility when ready for discharge  [] Diet: Dysphagia screening otherwise swallow evaluation.   [] HgA1C goals < 7.0   [] Lifestyle modifications: smoking cessation, exercise, and a Mediterranean style diet.   [] if patient has a convulsion, please document accurately the length of the episode, and specifically what the patient was doing paying attention to eye opening vs closure, gaze deviation, shaking of extremities, tongue bite, urinary incontinence, any derangement of vital signs  [] Fall Precautions  [] Seizure Precautions  [] Aspiration Precautions  [] Upon discharge, patient can follow up with Dr. Corona;   [address] [place] [address]. Call [phone].

## 2024-08-05 NOTE — PROGRESS NOTE ADULT - SUBJECTIVE AND OBJECTIVE BOX
[facility]   MD MARIBELL CARRION MD ANGELA WONG, PA    TEL:  OFFICE: [phone]  From 5pm-7am Answering Service [phone]    -- RENAL FOLLOW UP NOTE ---Date of Service 08-05-24 @ 10:37    Patient is a 69y old  Male who presents with a chief complaint of expressive aphasia (05 Aug 2024 07:36)      Patient seen and examined at bedside. No chest pain/sob    VITALS:  T(F): 97.7 (08-05-24 @ 04:45), Max: 97.9 (08-05-24 @ 00:00)  HR: 73 (08-05-24 @ 07:06)  BP: 150/61 (08-05-24 @ 07:06)  RR: 18 (08-05-24 @ 04:45)  SpO2: 98% (08-05-24 @ 04:45)  Wt(kg): --      Weight (kg): 46.2 (08-05 @ 10:08)    PHYSICAL EXAM:  General: NAD  Neck: No JVD  Respiratory: CTAB, no wheezes, rales or rhonchi  Cardiovascular: S1, S2, RRR  Gastrointestinal: BS+, soft, NT/ND  Extremities: b/l amputation    Hospital Medications:   MEDICATIONS  (STANDING):  aspirin enteric coated 81 milliGRAM(s) Oral daily  atorvastatin 40 milliGRAM(s) Oral at bedtime  carvedilol 3.125 milliGRAM(s) Oral every 12 hours  chlorhexidine 2% Cloths 1 Application(s) Topical daily  dextrose 5%. 1000 milliLiter(s) (100 mL/Hr) IV Continuous <Continuous>  dextrose 5%. 1000 milliLiter(s) (50 mL/Hr) IV Continuous <Continuous>  dextrose 50% Injectable 25 Gram(s) IV Push once  dextrose 50% Injectable 25 Gram(s) IV Push once  dextrose 50% Injectable 12.5 Gram(s) IV Push once  epoetin dinah (EPOGEN) Injectable 97196 Unit(s) IV Push <User Schedule>  glucagon  Injectable 1 milliGRAM(s) IntraMuscular once  heparin   Injectable 5000 Unit(s) SubCutaneous every 12 hours  insulin lispro (ADMELOG) corrective regimen sliding scale   SubCutaneous at bedtime  insulin lispro (ADMELOG) corrective regimen sliding scale   SubCutaneous three times a day with meals  latanoprost 0.005% Ophthalmic Solution 1 Drop(s) Both EYES at bedtime  mirtazapine 7.5 milliGRAM(s) Oral at bedtime  pantoprazole  Injectable 40 milliGRAM(s) IV Push daily  prednisoLONE acetate 1% Suspension 1 Drop(s) Both EYES two times a day      LABS:  08-04    140  |  96<L>  |  26<H>  ----------------------------<  78  4.9   |  28  |  4.27<H>    Ca    9.4      04 Aug 2024 10:31  Phos  3.5     08-04  Mg     3.00     08-04      Creatinine Trend: 4.27 <--, 4.19 <--, 3.15 <--, 4.56 <--, 2.98 <--, 4.10 <--, 3.94 <--, 3.21 <--, 2.42 <--, 4.93 <--            Urine Studies:  Urinalysis - [08-04-24 @ 10:31]      Color  / Appearance  / SG  / pH       Gluc 78 / Ketone   / Bili  / Urobili        Blood  / Protein  / Leuk Est  / Nitrite       RBC  / WBC  / Hyaline  / Gran  / Sq Epi  / Non Sq Epi  / Bacteria       Iron 42, TIBC 127, %sat 33      [05-11-24 @ 07:26]  Ferritin 1680      [05-11-24 @ 07:26]  PTH -- (Ca --)      [04-28-24 @ 05:00]   155  TSH 1.45      [07-31-24 @ 07:45]  Lipid: chol 119, TG 66, HDL 46, LDL --      [10-07-23 @ 09:30]    HBsAb 49.7      [08-02-24 @ 16:10]  HBsAg Nonreact      [08-02-24 @ 16:10]  HBcAb Nonreact      [08-02-24 @ 16:10]  HCV 0.12, Nonreact      [08-02-24 @ 16:10]      RADIOLOGY & ADDITIONAL STUDIES:

## 2024-08-05 NOTE — PROGRESS NOTE ADULT - PROBLEM SELECTOR PLAN 2
-CT chest with Similar appearance of a rounded consolidation in the left lower lobe adjacent to the pleura with associated architectural distortion of the surrounding bronchovascular bundles. Increase in right moderate loculated pleural effusion with associated atelectasis of the right lower lobe.  pulm rec for HD as effusions likely due to fluid overload  -Patient with new cough 8/2, CXR largely unchanged per my read, RVP neg -CT chest with Similar appearance of a rounded consolidation in the left lower lobe adjacent to the pleura with associated architectural distortion of the surrounding bronchovascular bundles. Increase in right moderate loculated pleural effusion with associated atelectasis of the right lower lobe.  pulm rec for HD as effusions likely due to fluid overload  -Patient with new cough 8/2, CXR largely unchanged per my read, RVP neg  Cough improved 8/5

## 2024-08-05 NOTE — PROGRESS NOTE ADULT - ASSESSMENT
69 year old M hx of ESRD on HD left AVF MWFr, CVA w/ gliosis, BKA/AKA functional quadriplegia, CAD, HTN, HLD, sent from dialysis unit for AMS. nephrology consulted for dialysis needs    ESRD:  On HD TIW via [facility].  Schedule is MWF. Consent obtained and charted from HCP Ramonita Vincent [phone]  s/p hd 8/2 uf 2.5L  hd today  - Renal diet.    AMS  s/p stroke code  f/u neuro  seems better     anemia  below goal   ct head done without acute finding  epo with hd  monitor    htn  acceptable  monitor    Hyperkalemia  Specimen was hemolyzed  Repeat K normal  Monitor    hypophosphatemia  Improved  not on binder  monitor    Hypermagnesemia  Avoid food rich in magnesium  Monitor

## 2024-08-06 ENCOUNTER — TRANSCRIPTION ENCOUNTER (OUTPATIENT)
Age: 69
End: 2024-08-06

## 2024-08-06 VITALS
OXYGEN SATURATION: 97 % | RESPIRATION RATE: 18 BRPM | DIASTOLIC BLOOD PRESSURE: 64 MMHG | TEMPERATURE: 98 F | SYSTOLIC BLOOD PRESSURE: 135 MMHG | HEART RATE: 70 BPM

## 2024-08-06 LAB
ALBUMIN SERPL ELPH-MCNC: 3.4 G/DL — SIGNIFICANT CHANGE UP (ref 3.3–5)
ALP SERPL-CCNC: 104 U/L — SIGNIFICANT CHANGE UP (ref 40–120)
ALT FLD-CCNC: 9 U/L — SIGNIFICANT CHANGE UP (ref 4–41)
ANION GAP SERPL CALC-SCNC: 17 MMOL/L — HIGH (ref 7–14)
AST SERPL-CCNC: 21 U/L — SIGNIFICANT CHANGE UP (ref 4–40)
BILIRUB SERPL-MCNC: 0.4 MG/DL — SIGNIFICANT CHANGE UP (ref 0.2–1.2)
BUN SERPL-MCNC: 18 MG/DL — SIGNIFICANT CHANGE UP (ref 7–23)
CALCIUM SERPL-MCNC: 9.2 MG/DL — SIGNIFICANT CHANGE UP (ref 8.4–10.5)
CHLORIDE SERPL-SCNC: 97 MMOL/L — LOW (ref 98–107)
CO2 SERPL-SCNC: 25 MMOL/L — SIGNIFICANT CHANGE UP (ref 22–31)
CREAT SERPL-MCNC: 4.07 MG/DL — HIGH (ref 0.5–1.3)
CULTURE RESULTS: ABNORMAL
EGFR: 15 ML/MIN/1.73M2 — LOW
GLUCOSE BLDC GLUCOMTR-MCNC: 121 MG/DL — HIGH (ref 70–99)
GLUCOSE BLDC GLUCOMTR-MCNC: 76 MG/DL — SIGNIFICANT CHANGE UP (ref 70–99)
GLUCOSE SERPL-MCNC: 58 MG/DL — LOW (ref 70–99)
HCT VFR BLD CALC: 38.1 % — LOW (ref 39–50)
HGB BLD-MCNC: 11.6 G/DL — LOW (ref 13–17)
MAGNESIUM SERPL-MCNC: 2.6 MG/DL — SIGNIFICANT CHANGE UP (ref 1.6–2.6)
MCHC RBC-ENTMCNC: 26.6 PG — LOW (ref 27–34)
MCHC RBC-ENTMCNC: 30.4 GM/DL — LOW (ref 32–36)
MCV RBC AUTO: 87.4 FL — SIGNIFICANT CHANGE UP (ref 80–100)
NRBC # BLD: 0 /100 WBCS — SIGNIFICANT CHANGE UP (ref 0–0)
NRBC # FLD: 0 K/UL — SIGNIFICANT CHANGE UP (ref 0–0)
PHOSPHATE SERPL-MCNC: 2.8 MG/DL — SIGNIFICANT CHANGE UP (ref 2.5–4.5)
PLATELET # BLD AUTO: 228 K/UL — SIGNIFICANT CHANGE UP (ref 150–400)
POTASSIUM SERPL-MCNC: 4.1 MMOL/L — SIGNIFICANT CHANGE UP (ref 3.5–5.3)
POTASSIUM SERPL-SCNC: 4.1 MMOL/L — SIGNIFICANT CHANGE UP (ref 3.5–5.3)
PROT SERPL-MCNC: 8 G/DL — SIGNIFICANT CHANGE UP (ref 6–8.3)
RBC # BLD: 4.36 M/UL — SIGNIFICANT CHANGE UP (ref 4.2–5.8)
RBC # FLD: 22.4 % — HIGH (ref 10.3–14.5)
SODIUM SERPL-SCNC: 139 MMOL/L — SIGNIFICANT CHANGE UP (ref 135–145)
SPECIMEN SOURCE: SIGNIFICANT CHANGE UP
WBC # BLD: 4.7 K/UL — SIGNIFICANT CHANGE UP (ref 3.8–10.5)
WBC # FLD AUTO: 4.7 K/UL — SIGNIFICANT CHANGE UP (ref 3.8–10.5)

## 2024-08-06 PROCEDURE — 99232 SBSQ HOSP IP/OBS MODERATE 35: CPT

## 2024-08-06 RX ORDER — PANTOPRAZOLE SODIUM 20 MG/1
1 TABLET, DELAYED RELEASE ORAL
Qty: 30 | Refills: 3
Start: 2024-08-06

## 2024-08-06 RX ORDER — ONDANSETRON HCL/PF 4 MG/2 ML
1 VIAL (ML) INJECTION
Refills: 0 | DISCHARGE

## 2024-08-06 RX ORDER — CALCIUM ACETATE 667 MG/5ML
1 SOLUTION ORAL
Qty: 30 | Refills: 3
Start: 2024-08-06

## 2024-08-06 RX ORDER — CALCIUM ACETATE 667 MG/5ML
1 SOLUTION ORAL
Refills: 0 | DISCHARGE

## 2024-08-06 RX ADMIN — PANTOPRAZOLE SODIUM 40 MILLIGRAM(S): 20 TABLET, DELAYED RELEASE ORAL at 11:42

## 2024-08-06 RX ADMIN — CHLORHEXIDINE GLUCONATE 1 APPLICATION(S): 500 CLOTH TOPICAL at 11:42

## 2024-08-06 RX ADMIN — Medication 81 MILLIGRAM(S): at 11:41

## 2024-08-06 RX ADMIN — HEPARIN SODIUM 5000 UNIT(S): 1000 INJECTION, SOLUTION INTRAVENOUS; SUBCUTANEOUS at 05:12

## 2024-08-06 RX ADMIN — PREDNISOLONE ACETATE 1 DROP(S): 10 SUSPENSION/ DROPS OPHTHALMIC at 05:12

## 2024-08-06 RX ADMIN — Medication 3.12 MILLIGRAM(S): at 05:12

## 2024-08-06 NOTE — PROGRESS NOTE ADULT - PROBLEM SELECTOR PROBLEM 4
Sacral pressure ulcer

## 2024-08-06 NOTE — PROGRESS NOTE ADULT - SUBJECTIVE AND OBJECTIVE BOX
Dottie Severino PGY1  pager [phone] or check resident tab for coverage    Patient is a 69y old  Male who presents with a chief complaint of expressive aphasia (05 Aug 2024 12:24)      SUBJECTIVE / OVERNIGHT EVENTS:    MEDICATIONS  (STANDING):  aspirin enteric coated 81 milliGRAM(s) Oral daily  atorvastatin 40 milliGRAM(s) Oral at bedtime  carvedilol 3.125 milliGRAM(s) Oral every 12 hours  chlorhexidine 2% Cloths 1 Application(s) Topical daily  dextrose 5%. 1000 milliLiter(s) (50 mL/Hr) IV Continuous <Continuous>  dextrose 5%. 1000 milliLiter(s) (100 mL/Hr) IV Continuous <Continuous>  dextrose 50% Injectable 12.5 Gram(s) IV Push once  dextrose 50% Injectable 25 Gram(s) IV Push once  dextrose 50% Injectable 25 Gram(s) IV Push once  epoetin dinah (EPOGEN) Injectable 98863 Unit(s) IV Push <User Schedule>  glucagon  Injectable 1 milliGRAM(s) IntraMuscular once  heparin   Injectable 5000 Unit(s) SubCutaneous every 12 hours  insulin lispro (ADMELOG) corrective regimen sliding scale   SubCutaneous at bedtime  insulin lispro (ADMELOG) corrective regimen sliding scale   SubCutaneous three times a day with meals  latanoprost 0.005% Ophthalmic Solution 1 Drop(s) Both EYES at bedtime  mirtazapine 7.5 milliGRAM(s) Oral at bedtime  pantoprazole  Injectable 40 milliGRAM(s) IV Push daily  prednisoLONE acetate 1% Suspension 1 Drop(s) Both EYES two times a day    MEDICATIONS  (PRN):  dextrose Oral Gel 15 Gram(s) Oral once PRN Blood Glucose LESS THAN 70 milliGRAM(s)/deciliter  sodium chloride 0.9% Bolus. 100 milliLiter(s) IV Bolus every 5 minutes PRN SBP LESS THAN or EQUAL to 80 mmHg      CAPILLARY BLOOD GLUCOSE      POCT Blood Glucose.: 98 mg/dL (05 Aug 2024 22:53)  POCT Blood Glucose.: 119 mg/dL (05 Aug 2024 17:26)  POCT Blood Glucose.: 94 mg/dL (05 Aug 2024 13:22)  POCT Blood Glucose.: 93 mg/dL (05 Aug 2024 12:25)  POCT Blood Glucose.: 88 mg/dL (05 Aug 2024 10:59)  POCT Blood Glucose.: 188 mg/dL (05 Aug 2024 08:32)    I&O's Summary    05 Aug 2024 07:01  -  06 Aug 2024 07:00  --------------------------------------------------------  IN: 500 mL / OUT: 2100 mL / NET: -1600 mL        Vital Signs Last 24 Hrs  T(C): 36.7 (06 Aug 2024 04:45), Max: 37.2 (05 Aug 2024 17:00)  T(F): 98.1 (06 Aug 2024 04:45), Max: 98.9 (05 Aug 2024 17:00)  HR: 75 (06 Aug 2024 04:45) (62 - 79)  BP: 143/60 (06 Aug 2024 04:45) (124/61 - 143/60)  BP(mean): --  RR: 18 (06 Aug 2024 04:45) (18 - 18)  SpO2: 97% (06 Aug 2024 04:45) (97% - 100%)    Parameters below as of 06 Aug 2024 04:45  Patient On (Oxygen Delivery Method): room air        PHYSICAL EXAM:  GENERAL: no distress  PSYCH: A&O x3  HEAD: Atraumatic, Normocephalic  NECK: Supple, No JVD  CHEST/LUNG: clear to auscultation bilaterally  HEART: regular rate and rhythm, no murmurs  ABDOMEN: nontender to palpation, no rebound tenderness/guarding  EXTREMITIES: no edema on bilateral LE  NEUROLOGY: no focal neurologic deficit  SKIN: No rashes or lesions    LABS:                        10.0   3.71  )-----------( 219      ( 05 Aug 2024 10:30 )             32.2      08-05    137  |  97<L>  |  32<H>  ----------------------------<  90  4.1   |  28  |  5.31<H>    Ca    9.0      05 Aug 2024 10:30  Phos  3.4     08-05  Mg     2.90     08-05            Urinalysis Basic - ( 05 Aug 2024 10:30 )    Color: x / Appearance: x / SG: x / pH: x  Gluc: 90 mg/dL / Ketone: x  / Bili: x / Urobili: x   Blood: x / Protein: x / Nitrite: x   Leuk Esterase: x / RBC: x / WBC x   Sq Epi: x / Non Sq Epi: x / Bacteria: x        RADIOLOGY & ADDITIONAL TESTS:    Imaging Personally Reviewed:    Consultant(s) Notes Reviewed:      Care Discussed with Consultants/Other Providers:   Dottie Severino PGY3  pager [phone] or check resident tab for coverage    Patient is a 69y old  Male who presents with a chief complaint of expressive aphasia (05 Aug 2024 12:24)      SUBJECTIVE / OVERNIGHT EVENTS: NAONE. Patient doing well. Denies cp/sob/n/v/d/c. Patient endorsing wanting to eat. reinforced the importance of a stable diet.     MEDICATIONS  (STANDING):  aspirin enteric coated 81 milliGRAM(s) Oral daily  atorvastatin 40 milliGRAM(s) Oral at bedtime  carvedilol 3.125 milliGRAM(s) Oral every 12 hours  chlorhexidine 2% Cloths 1 Application(s) Topical daily  dextrose 5%. 1000 milliLiter(s) (50 mL/Hr) IV Continuous <Continuous>  dextrose 5%. 1000 milliLiter(s) (100 mL/Hr) IV Continuous <Continuous>  dextrose 50% Injectable 12.5 Gram(s) IV Push once  dextrose 50% Injectable 25 Gram(s) IV Push once  dextrose 50% Injectable 25 Gram(s) IV Push once  epoetin dinah (EPOGEN) Injectable 72161 Unit(s) IV Push <User Schedule>  glucagon  Injectable 1 milliGRAM(s) IntraMuscular once  heparin   Injectable 5000 Unit(s) SubCutaneous every 12 hours  insulin lispro (ADMELOG) corrective regimen sliding scale   SubCutaneous at bedtime  insulin lispro (ADMELOG) corrective regimen sliding scale   SubCutaneous three times a day with meals  latanoprost 0.005% Ophthalmic Solution 1 Drop(s) Both EYES at bedtime  mirtazapine 7.5 milliGRAM(s) Oral at bedtime  pantoprazole  Injectable 40 milliGRAM(s) IV Push daily  prednisoLONE acetate 1% Suspension 1 Drop(s) Both EYES two times a day    MEDICATIONS  (PRN):  dextrose Oral Gel 15 Gram(s) Oral once PRN Blood Glucose LESS THAN 70 milliGRAM(s)/deciliter  sodium chloride 0.9% Bolus. 100 milliLiter(s) IV Bolus every 5 minutes PRN SBP LESS THAN or EQUAL to 80 mmHg      CAPILLARY BLOOD GLUCOSE      POCT Blood Glucose.: 98 mg/dL (05 Aug 2024 22:53)  POCT Blood Glucose.: 119 mg/dL (05 Aug 2024 17:26)  POCT Blood Glucose.: 94 mg/dL (05 Aug 2024 13:22)  POCT Blood Glucose.: 93 mg/dL (05 Aug 2024 12:25)  POCT Blood Glucose.: 88 mg/dL (05 Aug 2024 10:59)  POCT Blood Glucose.: 188 mg/dL (05 Aug 2024 08:32)    I&O's Summary    05 Aug 2024 07:01  -  06 Aug 2024 07:00  --------------------------------------------------------  IN: 500 mL / OUT: 2100 mL / NET: -1600 mL        Vital Signs Last 24 Hrs  T(C): 36.7 (06 Aug 2024 04:45), Max: 37.2 (05 Aug 2024 17:00)  T(F): 98.1 (06 Aug 2024 04:45), Max: 98.9 (05 Aug 2024 17:00)  HR: 75 (06 Aug 2024 04:45) (62 - 79)  BP: 143/60 (06 Aug 2024 04:45) (124/61 - 143/60)  BP(mean): --  RR: 18 (06 Aug 2024 04:45) (18 - 18)  SpO2: 97% (06 Aug 2024 04:45) (97% - 100%)    Parameters below as of 06 Aug 2024 04:45  Patient On (Oxygen Delivery Method): room air        PHYSICAL EXAM:  GENERAL: no distress  PSYCH: A&O x3  HEAD: Atraumatic, Normocephalic  NECK: Supple, No JVD  CHEST/LUNG: clear to auscultation bilaterally  HEART: regular rate and rhythm, no murmurs  ABDOMEN: nontender to palpation, no rebound tenderness/guarding  EXTREMITIES: no edema on bilateral LE  NEUROLOGY: no focal neurologic deficit  SKIN: No rashes or lesions    LABS:                        10.0   3.71  )-----------( 219      ( 05 Aug 2024 10:30 )             32.2      08-05    137  |  97<L>  |  32<H>  ----------------------------<  90  4.1   |  28  |  5.31<H>    Ca    9.0      05 Aug 2024 10:30  Phos  3.4     08-05  Mg     2.90     08-05            Urinalysis Basic - ( 05 Aug 2024 10:30 )    Color: x / Appearance: x / SG: x / pH: x  Gluc: 90 mg/dL / Ketone: x  / Bili: x / Urobili: x   Blood: x / Protein: x / Nitrite: x   Leuk Esterase: x / RBC: x / WBC x   Sq Epi: x / Non Sq Epi: x / Bacteria: x        RADIOLOGY & ADDITIONAL TESTS:    Imaging Personally Reviewed:    Consultant(s) Notes Reviewed:      Care Discussed with Consultants/Other Providers:

## 2024-08-06 NOTE — PROGRESS NOTE ADULT - NUTRITIONAL ASSESSMENT
This patient has been assessed with a concern for Malnutrition and has been determined to have a diagnosis/diagnoses of Moderate protein-calorie malnutrition.    This patient is being managed with:   Diet Minced and Moist-  DASH/TLC {Sodium & Cholesterol Restricted} (DASH)  Mildly Thick Liquids (MILDTHICKLIQS)  Entered: Aug  4 2024 12:09PM  
This patient has been assessed with a concern for Malnutrition and has been determined to have a diagnosis/diagnoses of Moderate protein-calorie malnutrition.    This patient is being managed with:   Diet Minced and Moist-  Consistent Carbohydrate {No Snacks} (CSTCHO)  DASH/TLC {Sodium & Cholesterol Restricted} (DASH)  Mildly Thick Liquids (MILDTHICKLIQS)  Entered: Jul 31 2024  2:48PM  
This patient has been assessed with a concern for Malnutrition and has been determined to have a diagnosis/diagnoses of Moderate protein-calorie malnutrition.    This patient is being managed with:   Diet Minced and Moist-  DASH/TLC {Sodium & Cholesterol Restricted} (DASH)  Mildly Thick Liquids (MILDTHICKLIQS)  Entered: Aug  4 2024 12:09PM  
This patient has been assessed with a concern for Malnutrition and has been determined to have a diagnosis/diagnoses of Moderate protein-calorie malnutrition.    This patient is being managed with:   Diet Minced and Moist-  Consistent Carbohydrate {No Snacks} (CSTCHO)  DASH/TLC {Sodium & Cholesterol Restricted} (DASH)  Mildly Thick Liquids (MILDTHICKLIQS)  Entered: Jul 31 2024  2:48PM

## 2024-08-06 NOTE — DISCHARGE NOTE NURSING/CASE MANAGEMENT/SOCIAL WORK - NSDCVIVACCINE_GEN_ALL_CORE_FT
Tdap; 15-Aug-2017 22:58; Shalom Stanley); [employer]; [id number]; IntraMuscular; Deltoid Left.; 0.5 milliLiter(s); VIS (VIS Published: 09-May-2013, VIS Presented: 15-Aug-2017);

## 2024-08-06 NOTE — PROGRESS NOTE ADULT - REASON FOR ADMISSION
expressive aphasia

## 2024-08-06 NOTE — PROGRESS NOTE ADULT - PROBLEM SELECTOR PROBLEM 2
Pleural effusion
Statement Selected
Pleural effusion

## 2024-08-06 NOTE — PROGRESS NOTE ADULT - ASSESSMENT
69 year old M hx of ESRD on HD left AVF MWFr, CVA w/ gliosis, BKA/AKA functional quadriplegia, CAD, HTN, HLD, sent from dialysis unit for AMS. nephrology consulted for dialysis needs    ESRD:  On HD TIW via [facility].  Schedule is MWF. Consent obtained and charted from HCP Ramonita Vincent [phone]  s/p hd 8/5 ur .6L  hd tmr  - Renal diet.    AMS  s/p stroke code  f/u neuro  seems better     anemia  below goal   ct head done without acute finding  epo with hd  monitor    htn  acceptable  monitor    Hyperkalemia  Specimen was hemolyzed  Repeat K normal  Monitor    hypophosphatemia  Improved  not on binder  monitor    Hypermagnesemia  Avoid food rich in magnesium  Monitor

## 2024-08-06 NOTE — DISCHARGE NOTE PROVIDER - NSDCCPTREATMENT_GEN_ALL_CORE_FT
PRINCIPAL PROCEDURE  Procedure: CT head and neck  Findings and Treatment: IMPRESSION:  1.  No acute intracranial hemorrhage or acute retrievable clot.  2.  MRI would be more sensitive for acute ischemia.  3.  Stable chronic infarcts, senescent cerebral changes, and hyperdense   mass at the left CPA.  4.  Proximal right ICA stenosis, 40-50%.  5.  Multifocal stenotic lesions in the basilar artery and both V4   vertebral segments.  6.  Correlate clinically for vertebrobasilar insufficiency.  7.  Heavily calcified chronic plaque with moderate to severe bilateral   DENISE stenoses.  Dr. Escalante called report to Dr. Daniels 07/29/24 02:40 PM.

## 2024-08-06 NOTE — DISCHARGE NOTE NURSING/CASE MANAGEMENT/SOCIAL WORK - NSDCPEFALRISK_GEN_ALL_CORE
For information on Fall & Injury Prevention, visit: [email or web address] OR  [email or web address] OR  https://www.cdc.gov/steadi/patient.html

## 2024-08-06 NOTE — PROGRESS NOTE ADULT - SUBJECTIVE AND OBJECTIVE BOX
Neurology Progress Note    S: Patient seen and examined.     Medications: MEDICATIONS  (STANDING):  aspirin enteric coated 81 milliGRAM(s) Oral daily  atorvastatin 40 milliGRAM(s) Oral at bedtime  carvedilol 3.125 milliGRAM(s) Oral every 12 hours  chlorhexidine 2% Cloths 1 Application(s) Topical daily  dextrose 5%. 1000 milliLiter(s) (50 mL/Hr) IV Continuous <Continuous>  dextrose 5%. 1000 milliLiter(s) (100 mL/Hr) IV Continuous <Continuous>  dextrose 50% Injectable 12.5 Gram(s) IV Push once  dextrose 50% Injectable 25 Gram(s) IV Push once  dextrose 50% Injectable 25 Gram(s) IV Push once  epoetin dinah (EPOGEN) Injectable 72390 Unit(s) IV Push <User Schedule>  glucagon  Injectable 1 milliGRAM(s) IntraMuscular once  heparin   Injectable 5000 Unit(s) SubCutaneous every 12 hours  insulin lispro (ADMELOG) corrective regimen sliding scale   SubCutaneous at bedtime  insulin lispro (ADMELOG) corrective regimen sliding scale   SubCutaneous three times a day with meals  latanoprost 0.005% Ophthalmic Solution 1 Drop(s) Both EYES at bedtime  mirtazapine 7.5 milliGRAM(s) Oral at bedtime  pantoprazole  Injectable 40 milliGRAM(s) IV Push daily  prednisoLONE acetate 1% Suspension 1 Drop(s) Both EYES two times a day    MEDICATIONS  (PRN):  dextrose Oral Gel 15 Gram(s) Oral once PRN Blood Glucose LESS THAN 70 milliGRAM(s)/deciliter  sodium chloride 0.9% Bolus. 100 milliLiter(s) IV Bolus every 5 minutes PRN SBP LESS THAN or EQUAL to 80 mmHg       Vitals:  Vital Signs Last 24 Hrs  T(C): 36.9 (06 Aug 2024 12:45), Max: 37.3 (06 Aug 2024 08:45)  T(F): 98.5 (06 Aug 2024 12:45), Max: 99.1 (06 Aug 2024 08:45)  HR: 70 (06 Aug 2024 12:45) (65 - 75)  BP: 135/64 (06 Aug 2024 12:45) (135/64 - 148/76)  BP(mean): --  RR: 18 (06 Aug 2024 12:45) (18 - 18)  SpO2: 97% (06 Aug 2024 12:45) (97% - 98%)    Parameters below as of 06 Aug 2024 12:45  Patient On (Oxygen Delivery Method): room air                    General:  Constitutional: Male, appears stated age, in no apparent distress including pain  Head: Normocephalic & Atraumatic.  Respiratory: Breathing comfortably.    Neurological:  MS: Eyes closed, open to noxious stimuli, alert AAox2  Language: Speech is clearer today, still with mild dysarthria     CNs: PERRL (R = 3mm, L = 3mm). No blink to threat b/l; patient able to make out shapes, but not objects or count fingers. Dysconjugate gaze w/ exodeviation. No facial asymmetry initially, on reassessment smile is symmetric. Moderate to severe dysarthria.     Motor: Normal muscle bulk & tone. Bilateral UE drifts, R>L, however there appears to be intermittent loss of tone consistent with asterixis. 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     Sensation: Grimaces symmetrically to noxious stimuli b/l.     Coordination: unable to assess due to visual acuity baseline.     Gait: Patient unable to assess at baseline.       I personally reviewed the below data/images/labs:      LABS:                          11.6   4.70  )-----------( 228      ( 06 Aug 2024 06:20 )             38.1     08-06    139  |  97<L>  |  18  ----------------------------<  58<L>  4.1   |  25  |  4.07<H>    Ca    9.2      06 Aug 2024 06:20  Phos  2.8     08-06  Mg     2.60     08-06    TPro  8.0  /  Alb  3.4  /  TBili  0.4  /  DBili  x   /  AST  21  /  ALT  9   /  AlkPhos  104  08-06    LIVER FUNCTIONS - ( 06 Aug 2024 06:20 )  Alb: 3.4 g/dL / Pro: 8.0 g/dL / ALK PHOS: 104 U/L / ALT: 9 U/L / AST: 21 U/L / GGT: x             Urinalysis Basic - ( 06 Aug 2024 06:20 )    Color: x / Appearance: x / SG: x / pH: x  Gluc: 58 mg/dL / Ketone: x  / Bili: x / Urobili: x   Blood: x / Protein: x / Nitrite: x   Leuk Esterase: x / RBC: x / WBC x   Sq Epi: x / Non Sq Epi: x / Bacteria: x            CTH/CTA/CTP:    There are chronic infarcts in both PCA territories and in the cerebellum   bilaterally, unchanged. There is also a chronic infarct at the left   superior frontal gyrus. There are small chronic white matter infarcts in   the right corona radiata and centrum semiovale, better seen on theprior   MRI.    A hyperdense mass is noted at the left CPA. There are dense   calcifications at the interhemispheric fissure and in the left middle   frontal gyrus. There are extensive vascular calcifications within the ECA   branches in the soft tissues of the scalp. Accounting for differences in   technique, all of these findings appear unchanged.    Moderate generalized cerebral volume loss, with distention of the sulci   and concomitant ex-vacuo ventricular dilatation. Moderate to severe   nonspecific low attenuation in the periventricular and subcortical white   matter, likely due to small vessel disease.    No acute intracranial hemorrhage. No midline shift or herniation.    The visualized sinuses and mastoids are clear. Left lens implant. Limited   views of the orbits and visualized soft tissues of the neck, face, scalp,   skull base, and calvarium are otherwise unremarkable.    Using a threshold of 30%, there is an rCBF defect in the right PCA   territory measuring 5 mL, corresponding to a chronic infarct on the CT.   Using a threshold of 6s, there is a matching Tmax abnormality in the   right PCA territory measuring 5 mL. No evidence of a perfusion mismatch   to suggest brain tissue at risk, although MRI would be more sensitive for   acute ischemia.    There is calcified atheromatous plaque at the great vessel origins, with   mild narrowing less than 10%. The left vertebral artery arises directly   from the arch and is congenitally hypoplastic. There is calcified plaque   at the origin of the dominant right vertebral artery and at the right   V2/V3 segment, with mild narrowing.    The carotid bifurcations are not well seen due to patient   motion/swallowing artifact. On the right, there is heavily calcified   plaque with 40-50% stenosis of the proximal right ICA. On the left, there   is calcified plaque at the carotid bifurcation and ICA origin with less   than 10% narrowing.    Otherwise, the right and left ICAs, CCAs, vertebrals, subclavians, and   the brachiocephalic artery are negative. No ulcerated plaque or arterial   dissection.    Intracranially, there is calcified plaque with moderate right and   moderate to severe left V4 vertebral segment stenoses. The left vertebral   artery probably ends in a PICA, without joining the basilar. The basilar   artery is diffusely hypoplastic and at least moderately stenosed.   Correlate clinically for vertebrobasilar insufficiency.    There is heavily calcified plaque with mild to moderate narrowing of the   C4-C6 ICA segments bilaterally. There is fetal origin of the right PCA.   There are mild to moderate stenotic lesions in the M1 segment of the   right MCA and in the PCOM segment of the fetal right PCA. There is   heavily calcified plaque with moderate tosevere narrowing in both A2   segments distally. There is adequate runoff, so this could be   artifactually accentuated.    No intracranial aneurysms or large vessel occlusions. No large feeding   arteries or draining veins. The ACAs, MCAs, PCAs, andcarotid siphons are   otherwise negative. The basilar artery and V4 vertebral segments are   otherwise negative. Abnormalities of  vessels and distal   intracranial branches are beyond the resolution of this technique, and   catheter angiography would be more sensitive.    The dural venous sinuses are grossly patent, although this examination   wasn't optimized to evaluate the cerebral veins. Mild degenerative   changes are noted in the cervical spine. No gross evidence of an acute   fracture, although this examination wasn't optimized to evaluate the   spine. There are bilateral pleural effusions, with passive atelectasis.   There is septal thickening at the lung apices.    IMPRESSION:    1.  No acute intracranial hemorrhage or acute retrievable clot.  2.  MRI would be more sensitive for acute ischemia.  3.  Stable chronic infarcts, senescent cerebral changes, and hyperdense   mass at the left CPA.  4.  Proximal right ICA stenosis, 40-50%.  5.  Multifocal stenotic lesions in the basilar artery and both V4   vertebral segments.  6.  Correlate clinically for vertebrobasilar insufficiency.  7.  Heavily calcified chronic plaque with moderate to severe bilateral   DENISE stenoses.        MRI brain w/w/o contrast (Epilepsy protocol) 7/2024:    1. Unchanged left-sided CP angle vestibular schwannoma.    2. Multiple unchanged chronic intracranial findings, as discussed.    3. Similar-appearing extensive chronic white matter microvascular type   changes.    4. No evidence of acute intracranial hemorrhage or acute cerebral   ischemia.    EEG x 24 hours 7/2024:    Mild bilateral temporal focal cerebral dysfunction can be structural or functional in etiology.  No epileptiform abnormalities or seizures.      EEG Classification / Summary:  Abnormal  EEG in the awake state  1. Mild generalized background slowing, of nonspecific etiology    Clinical Impression:  Mild diffuse cerebral dysfunction, which can indicate an encephalopathic process, including but not limited to toxic and metabolic.   There were no epileptiform abnormalities recorded.

## 2024-08-06 NOTE — PROGRESS NOTE ADULT - SUBJECTIVE AND OBJECTIVE BOX
[facility]   MD MARIBELL CARRION MD ANGELA WONG, PA    TEL:  OFFICE: [phone]  From 5pm-7am Answering Service [phone]    -- RENAL FOLLOW UP NOTE ---Date of Service 08-06-24 @ 10:24    Patient is a 69y old  Male who presents with a chief complaint of expressive aphasia (06 Aug 2024 07:28)      Patient seen and examined at bedside. No chest pain/sob    VITALS:  T(F): 99.1 (08-06-24 @ 08:45), Max: 99.1 (08-06-24 @ 08:45)  HR: 73 (08-06-24 @ 08:45)  BP: 148/76 (08-06-24 @ 08:45)  RR: 18 (08-06-24 @ 08:45)  SpO2: 98% (08-06-24 @ 08:45)  Wt(kg): --    08-05 @ 07:01  -  08-06 @ 07:00  --------------------------------------------------------  IN: 500 mL / OUT: 2100 mL / NET: -1600 mL          PHYSICAL EXAM:  General: NAD  Neck: No JVD  Respiratory: CTAB, no wheezes, rales or rhonchi  Cardiovascular: S1, S2, RRR  Gastrointestinal: BS+, soft, NT/ND  Extremities: b/l amputation    Hospital Medications:   MEDICATIONS  (STANDING):  aspirin enteric coated 81 milliGRAM(s) Oral daily  atorvastatin 40 milliGRAM(s) Oral at bedtime  carvedilol 3.125 milliGRAM(s) Oral every 12 hours  chlorhexidine 2% Cloths 1 Application(s) Topical daily  dextrose 5%. 1000 milliLiter(s) (50 mL/Hr) IV Continuous <Continuous>  dextrose 5%. 1000 milliLiter(s) (100 mL/Hr) IV Continuous <Continuous>  dextrose 50% Injectable 12.5 Gram(s) IV Push once  dextrose 50% Injectable 25 Gram(s) IV Push once  dextrose 50% Injectable 25 Gram(s) IV Push once  epoetin dinah (EPOGEN) Injectable 00776 Unit(s) IV Push <User Schedule>  glucagon  Injectable 1 milliGRAM(s) IntraMuscular once  heparin   Injectable 5000 Unit(s) SubCutaneous every 12 hours  insulin lispro (ADMELOG) corrective regimen sliding scale   SubCutaneous at bedtime  insulin lispro (ADMELOG) corrective regimen sliding scale   SubCutaneous three times a day with meals  latanoprost 0.005% Ophthalmic Solution 1 Drop(s) Both EYES at bedtime  mirtazapine 7.5 milliGRAM(s) Oral at bedtime  pantoprazole  Injectable 40 milliGRAM(s) IV Push daily  prednisoLONE acetate 1% Suspension 1 Drop(s) Both EYES two times a day      LABS:  08-06    139  |  97<L>  |  18  ----------------------------<  58<L>  4.1   |  25  |  4.07<H>    Ca    9.2      06 Aug 2024 06:20  Phos  2.8     08-06  Mg     2.60     08-06    TPro  8.0  /  Alb  3.4  /  TBili  0.4  /  DBili      /  AST  21  /  ALT  9   /  AlkPhos  104  08-06    Creatinine Trend: 4.07 <--, 5.31 <--, 4.27 <--, 4.19 <--, 3.15 <--, 4.56 <--, 2.98 <--, 4.10 <--, 3.94 <--    Albumin: 3.4 g/dL (08-06 @ 06:20)  Phosphorus: 2.8 mg/dL (08-06 @ 06:20)  Phosphorus: 3.4 mg/dL (08-05 @ 10:30)                              11.6   4.70  )-----------( 228      ( 06 Aug 2024 06:20 )             38.1     Urine Studies:  Urinalysis - [08-06-24 @ 06:20]      Color  / Appearance  / SG  / pH       Gluc 58 / Ketone   / Bili  / Urobili        Blood  / Protein  / Leuk Est  / Nitrite       RBC  / WBC  / Hyaline  / Gran  / Sq Epi  / Non Sq Epi  / Bacteria       Iron 42, TIBC 127, %sat 33      [05-11-24 @ 07:26]  Ferritin 1680      [05-11-24 @ 07:26]  PTH -- (Ca --)      [04-28-24 @ 05:00]   155  TSH 1.45      [07-31-24 @ 07:45]  Lipid: chol 119, TG 66, HDL 46, LDL --      [10-07-23 @ 09:30]    HBsAb 49.7      [08-02-24 @ 16:10]  HBsAg Nonreact      [08-02-24 @ 16:10]  HBcAb Nonreact      [08-02-24 @ 16:10]  HCV 0.12, Nonreact      [08-02-24 @ 16:10]      RADIOLOGY & ADDITIONAL STUDIES:

## 2024-08-06 NOTE — DISCHARGE NOTE PROVIDER - NSFOLLOWUPCLINICS_GEN_ALL_ED_FT
[facility] Medicine Specialties at [place]  Internal Medicine  [address] [place] [address]  Phone: [phone]  Fax: [phone]    [facility]  [facility]  [address] [facility] [address] [place] [address]  Phone: [phone]  Fax:     [facility]  Nephrology  [address] [place] [address]  Phone: [phone]  Fax:

## 2024-08-06 NOTE — PROGRESS NOTE ADULT - PROVIDER SPECIALTY LIST ADULT
Nephrology
Neurology
Neurology
Nephrology
Nephrology
Neurology
Pulmonology
Internal Medicine
Nephrology
Neurology
Neurology
Internal Medicine
Internal Medicine
Hospitalist
Internal Medicine
Hospitalist

## 2024-08-06 NOTE — PROGRESS NOTE ADULT - ASSESSMENT
69M with ESRD on HD left AVF MWFr, OU blindness with bilateral occipital lobes chronic infarcts (on ASA 81 mg qd), BKA/AKA functional quadriplegia, CAD, HTN, HLD, Dementia, recent admission for unresponsiveness in early 7/2024 found to be in urosepsis, presents to [facility] ED as code stroke for unresponsiveness. LKW approximately 12 pm.  Before receiving dialysis, patient was found to be unresponsive to voice so EMS was called.  NIHSS 13 initially  MRS 5  Now AAOx 2 on exam but lethargic  CTH with known chronic infarcts, L CPA mass   CTA with known posterior circulation stenosis   Deemed not tnk/thrombectomy candidate  EEG with generalized slowing, no seizures    Impression: Unresponsiveness followed by dysarthria, diaphoresis, asterixis more likely due to diffuse cerebral dysfunction from toxic metabolic and/or infectious etiologies    Recommendations     [] aspirin for seocndary stroke prevention  [] eeg without seizure , now off  [] as mental status improving, can hold off on MRI for now  [] infectious workup per primary team - off ABx  [] remeron changed to bedtime   [] C/w home Atorvastatin 40 mg qhs   [] Okay to resume other home medications including for BP if no other contraindications  [] Neurochecks: q4hr   [] BP goals: No need for permissive HTN. C/w home medications and follow long term management goals for HTN. Avoid hypotension (<90/60 mmHg) if possible.   [] PT/OT - back to nursing facility when ready for discharge  [] Diet: Dysphagia screening otherwise swallow evaluation.   [] HgA1C goals < 7.0   [] Lifestyle modifications: smoking cessation, exercise, and a Mediterranean style diet.   [] if patient has a convulsion, please document accurately the length of the episode, and specifically what the patient was doing paying attention to eye opening vs closure, gaze deviation, shaking of extremities, tongue bite, urinary incontinence, any derangement of vital signs  [] Fall Precautions  [] Seizure Precautions  [] Aspiration Precautions  [] Upon discharge, patient can follow up with Dr. Corona;   [address] [place] [address]. Call [phone].

## 2024-08-06 NOTE — PROGRESS NOTE ADULT - PROBLEM SELECTOR PLAN 6
F- restrict if able to resume diet  E-replete prn  N-diet minced and moist; dc'd [facility] to liberalize diet as pt with hypoglycemia or running low normal  heparin subQ  Dispo to new LTC F- restrict if able to resume diet  E-replete prn  N-diet minced and moist; dc'd NCS to liberalize diet as pt with hypoglycemia or running low normal. Patient with improved appetite and eating better today. Low Po intake likely 2/2 to encephalopathy which has improved  heparin subQ  Dispo to new LTC

## 2024-08-06 NOTE — PROGRESS NOTE ADULT - PROBLEM SELECTOR PLAN 2
-CT chest with Similar appearance of a rounded consolidation in the left lower lobe adjacent to the pleura with associated architectural distortion of the surrounding bronchovascular bundles. Increase in right moderate loculated pleural effusion with associated atelectasis of the right lower lobe.  pulm rec for HD as effusions likely due to fluid overload  -Patient with new cough 8/2, CXR largely unchanged per my read, RVP neg  Cough improved 8/5

## 2024-08-06 NOTE — DISCHARGE NOTE PROVIDER - NSDCCPCAREPLAN_GEN_ALL_CORE_FT
PRINCIPAL DISCHARGE DIAGNOSIS  Diagnosis: AMS (altered mental status)  Assessment and Plan of Treatment: You were noted to have alteration in you mentation on admission. You received electrolyte repletion and dialysis after which you seemed to improve. Your EEG was negative and you did not have any seizures. You also received multiple CT scans of your head and neck which did not show any narrowing or signs of a new stroke. You also seemed to improve after removing excess fluid buildup in your body after your hemodialysis sessions. If you or someone else notices signs of worsening mentation, slurred speech, shortness of breath, chest pain, fevers/chills, nausea, vomiting, uncontrollable shaking and confusion, please come to the ED immediately      SECONDARY DISCHARGE DIAGNOSES  Diagnosis: Pleural effusion  Assessment and Plan of Treatment: You were also noted to have some fluid in your space around your lung. This is sometimes seen in the states of volume overload which mean you had excess fluid buildup in your body. Generally dialysis helps with removing excess fluid so it is important to regularly keep going to your dialysis sessions and removing this fluid. This will help  you breath better as well. If you notice signs of worsening shortness of breath, chest pain, fevers, chills, nausea, vomiting, or cough with blood, please come to the ED right away.

## 2024-08-06 NOTE — DISCHARGE NOTE PROVIDER - NSDCFUADDAPPT_GEN_ALL_CORE_FT
APPTS ARE READY TO BE MADE: [ x ] YES    Best Family or Patient Contact (if needed):    Additional Information about above appointments (if needed):    1: PCP 1-2 weeks  2: Neurology 1-2 weeks  3: Nephrology 1-2 weeks    Other comments or requests:    APPTS ARE READY TO BE MADE: [ x ] YES    Best Family or Patient Contact (if needed):    Additional Information about above appointments (if needed):    1: PCP 1-2 weeks  2: Neurology 1-2 weeks  3: Nephrology 1-2 weeks    Other comments or requests:     Patient is being discharged to rehab [facility]. Caregiver will arrange follow up.

## 2024-08-06 NOTE — PROGRESS NOTE ADULT - ATTENDING COMMENTS
Patient is a 70 yo M w/ ESRD on HD, functional quadriplegia (R sided BKA + L sided AKA,), blindness, CAD, HTN, HLD, Chronic hypoxemic respiratory failure (2L home O2) who presents today with AMS and minimal responsiveness during HD session. As per chart review, patient was in usual state of health this AM prior to HD session but was found to be unresponsive to voice before HD. Patient then awoke in ED with unintelligible speech. Code stroke was called in ED, CTH with no acute findings. Admitted to medicine, being treated empirically for infection, unclear source. Pulmonary called for pleural effusions.     Labs notable for no leukocytosis WBC 4, Hb 8.6, Bicarb 30, Trop 365    CTH showed no acute intracranial hemorrhage or acute retrievable clot, stable chronic infarcts, senescent cerebral changes, and hyperdense   mass at the left CPA, proximal right ICA stenosis, 40-50%, multifocal stenotic lesions in the basilar artery and both V4 vertebral segments, heavily calcified chronic plaque with moderate to severe bilateral DENISE stenoses.    CT chest showed increase in moderate right loculated pleural effusion, stable small left pleural effusion, similar appearance of rounded atelectasis in the left lower lobe    Interval CXR stable, improved O2 requirements. More awake, verbal, AAO x2     #Acute on chronic hypoxemic respiratory failure - Chronic, unclear associated diagnosis, currently saturating well. CT chest with bilateral pleural effusions, recurrent in nature, now R>L. Most likely in setting of chronic volume overload, of note, the laterality of the larger side changes over time. Noted thoracentesis of L also for large pleural effusion in 6/2023, transudative with negative cyto x1.   #Pleural effusions - Recurrent, likely 2/2 chronic volume overload. SImple on POCUS  #Encephalopathy  #ESRD  #HFrEF  - No current indication for thoracentesis at this time  - Recommend HD for net negative  - c/w supplemental O2, wean as tolerated  - Encephalopathy work up as per neuro/primary team    Pulmonary to sign off, please call back if any questions    Diego Sam MD  Pulmonary & Critical Care
69 y.o. M w/ a hx of ESRD on HD via AVF, HTN, CAD, CVA, dementia (A&Ox 1-2) who is hospitalized for encephalopathy.     Patient moaning, reports pain in his backside, not offering other complaints, intermittently not answering questions. PE: Lethargic, awakens to light sternal rub, intermittently answering questions.     # Encephalopathy: Unclear etiology, recent admission with similar presentation, tx at that time for UTI though Ucx was negative. Recent MRI neg. No fevers, leukocytosis, no localizing s/s of infection. Cont abx until Bcx return- if negative, D/C abx and monitor off. Will need collateral from family. Check ammonia. Neuro following.   # Bilateral pleural effusions: Tapped in 2023, transudative at the time, thought to be in setting of hypervolemia from ESRD. Now, R>L sided effusion with ?mass like consolidation present for >1 year, loculated. C/s Pulm.
69 y.o. M w/ a hx of ESRD on HD via AVF, HTN, CAD, CVA, dementia (A&Ox 1-2) who is hospitalized for encephalopathy.     Patient answering questions appropriately.     # Encephalopathy: Unclear etiology, recent admission with similar presentation, tx at that time for UTI though Ucx was negative. No fevers, leukocytosis, no localizing s/s of infection. VEEG negative. MS returned to baseline so will defer MRI brain.   # Bilateral pleural effusions: Tapped in 2023, transudative at the time, thought to be in setting of hypervolemia from ESRD. Now, R>L sided effusion with ?mass like consolidation present for >1 year. Per Pulm- simple, free flowing effusion. Consolidation suspected to be 2/2 atelectasis per Pulm.   # ESRD: S/p HD 7/31, plan for HD 8/5  # Dispo: Back to LTC, family wants new placement.
69 y.o. M w/ a hx of ESRD on HD via AVF, HTN, CAD, CVA, dementia (A&Ox 1-2) who is hospitalized for encephalopathy.     Patient answering questions appropriately.     # Encephalopathy: Unclear etiology, recent admission with similar presentation, tx at that time for UTI though Ucx was negative. No fevers, leukocytosis, no localizing s/s of infection. VEEG negative. MS returned to baseline so will defer MRI brain.   # Bilateral pleural effusions: Tapped in 2023, transudative at the time, thought to be in setting of hypervolemia from ESRD. Now, R>L sided effusion with ?mass like consolidation present for >1 year. Per Pulm- simple, free flowing effusion. Consolidation suspected to be 2/2 atelectasis per Pulm.   # ESRD: S/p HD 7/31, plan for HD 8/7  # Dispo: Back to LTC today
69 y.o. M w/ a hx of ESRD on HD via AVF, HTN, CAD, CVA, dementia (A&Ox 1-2) who is hospitalized for encephalopathy.     Patient awake on approach, interactive and responding to questions. Ate 50-75% of breakfast this AM.     # Encephalopathy: Unclear etiology, recent admission with similar presentation, tx at that time for UTI though Ucx was negative. No fevers, leukocytosis, no localizing s/s of infection. VEEG negative. Will d/w family- if nearing baseline, will defer MRI as patient's recent MRI was negative.   # Bilateral pleural effusions: Tapped in 2023, transudative at the time, thought to be in setting of hypervolemia from ESRD. Now, R>L sided effusion with ?mass like consolidation present for >1 year. Per Pulm- simple, free flowing effusion. Consolidation suspected to be 2/2 atelectasis per Pulm.   # ESRD: S/p HD 7/31  # Dispo: Back to LTC
69 y.o. M w/ a hx of ESRD on HD via AVF, HTN, CAD, CVA, dementia (A&Ox 1-2) who is hospitalized for encephalopathy.     Patient awake on approach, interactive and responding to questions. PE unchanged.     # Cough: New today, CXR pending. Flu/RSV/COVID negative  # Encephalopathy: Unclear etiology, recent admission with similar presentation, tx at that time for UTI though Ucx was negative. No fevers, leukocytosis, no localizing s/s of infection. VEEG negative. MS nearing baseline, defer MRI.   # Bilateral pleural effusions: Tapped in 2023, transudative at the time, thought to be in setting of hypervolemia from ESRD. Now, R>L sided effusion with ?mass like consolidation present for >1 year. Per Pulm- simple, free flowing effusion. Consolidation suspected to be 2/2 atelectasis per Pulm.   # ESRD: S/p HD 7/31, plan for HD 8/2  # Dispo: Back to LTC
69 y.o. M w/ a hx of ESRD on HD via AVF, HTN, CAD, CVA, dementia (A&Ox 1-2) who is hospitalized for encephalopathy.     Patient wakes up to verbal stimuli, answers a few questions. PE otherwise unchanged.     # Encephalopathy: Unclear etiology, recent admission with similar presentation, tx at that time for UTI though Ucx was negative. Recent MRI neg. No fevers, leukocytosis, no localizing s/s of infection. D/C abx until Bcx return. Neuro following. Check VEEG.   # Bilateral pleural effusions: Tapped in 2023, transudative at the time, thought to be in setting of hypervolemia from ESRD. Now, R>L sided effusion with ?mass like consolidation present for >1 year. Per Pulm- simple, free flowing effusion. F/u Pulm re "masslike consolidation" present for 1 year.

## 2024-08-06 NOTE — DISCHARGE NOTE PROVIDER - NSDCMRMEDTOKEN_GEN_ALL_CORE_FT
aspirin 81 mg oral capsule: 1 cap(s) orally once a day  atorvastatin 40 mg oral tablet: 1 tab(s) orally once a day  calcium acetate 667 mg oral capsule: 1 cap(s) orally 3 times a day  carvedilol 6.25 mg oral tablet: 1 tab(s) orally 2 times a day  TAINA once a week:   furosemide 40 mg oral tablet: 1 tab(s) orally 2 times a day  lactobacillus acidophilus: 1 tab(s) once a day  latanoprost 0.005% ophthalmic emulsion: 1 drop(s) in each affected eye once a day (at bedtime) both eyes  midodrine: 7.5 milligram(s) orally 3 times a week three times a week before dialysis  midodrine 7.5 mg Monday Wed Friday before HD:   mirtazapine 7.5 mg oral tablet: 1 tab(s) orally once a day (at bedtime)  multivitamin: 1 tab(s) orally once a day  NexIUM 40 mg oral delayed release capsule: 1 cap(s) orally once a day  oxycodone-acetaminophen 5 mg-325 mg oral tablet: 1 tab(s) orally every 6 hours for pain  prednisoLONE ophthalmic: 1 application 2 times a day 1% BID  senna (sennosides) 8.6 mg oral tablet: 1 tab(s) orally once a day  venofer once a week:   vitamin c 500 mg once a day:   Zofran 4 mg oral tablet: 1 tab(s) orally 2 times a day for nausea   aspirin 81 mg oral capsule: 1 cap(s) orally once a day  atorvastatin 40 mg oral tablet: 1 tab(s) orally once a day  carvedilol 6.25 mg oral tablet: 1 tab(s) orally 2 times a day  TAINA once a week:   furosemide 40 mg oral tablet: 1 tab(s) orally 2 times a day  lactobacillus acidophilus: 1 tab(s) once a day  latanoprost 0.005% ophthalmic emulsion: 1 drop(s) in each affected eye once a day (at bedtime) both eyes  midodrine: 7.5 milligram(s) orally 3 times a week three times a week before dialysis  mirtazapine 7.5 mg oral tablet: 1 tab(s) orally once a day (at bedtime)  multivitamin: 1 tab(s) orally once a day  NexIUM 40 mg oral delayed release capsule: 1 cap(s) orally once a day  oxycodone-acetaminophen 5 mg-325 mg oral tablet: 1 tab(s) orally every 6 hours for pain  prednisoLONE ophthalmic: 1 application 2 times a day 1% BID  senna (sennosides) 8.6 mg oral tablet: 1 tab(s) orally once a day  venofer once a week:   vitamin c 500 mg once a day:

## 2024-08-06 NOTE — DISCHARGE NOTE PROVIDER - DETAILS OF MALNUTRITION DIAGNOSIS/DIAGNOSES
This patient has been assessed with a concern for Malnutrition and was treated during this hospitalization for the following Nutrition diagnosis/diagnoses:     -  08/01/2024: Moderate protein-calorie malnutrition

## 2024-08-06 NOTE — DISCHARGE NOTE PROVIDER - NSDCFUSCHEDAPPT_GEN_ALL_CORE_FT
[facility] [phone]  Scheduled Appointment: 08/27/2024    [facility] [phone]  Scheduled Appointment: 09/27/2024

## 2024-08-06 NOTE — PROGRESS NOTE ADULT - PROBLEM SELECTOR PLAN 5
-monitor; will give midodrine on HD days.

## 2024-08-06 NOTE — PROGRESS NOTE ADULT - PROBLEM SELECTOR PLAN 4
Patient with sacral ulcer on exam about 1-2 inch in diameter. No clear signs of infection noted on exam .  -Wound care

## 2024-08-27 ENCOUNTER — APPOINTMENT (OUTPATIENT)
Dept: ELECTROPHYSIOLOGY | Facility: CLINIC | Age: 69
End: 2024-08-27

## 2024-09-13 NOTE — PHYSICAL THERAPY INITIAL EVALUATION ADULT - DIAGNOSIS, PT EVAL
Patient daughter is calling back to go over her mothers pain diary info with the nurse.   Patient can be reached at [phone]   vomiting

## 2024-09-27 ENCOUNTER — APPOINTMENT (OUTPATIENT)
Dept: ELECTROPHYSIOLOGY | Facility: CLINIC | Age: 69
End: 2024-09-27
Payer: MEDICAID

## 2024-09-27 ENCOUNTER — NON-APPOINTMENT (OUTPATIENT)
Age: 69
End: 2024-09-27

## 2024-09-27 PROCEDURE — 93298 REM INTERROG DEV EVAL SCRMS: CPT

## 2024-09-28 NOTE — ED PROVIDER NOTE - PROGRESS NOTE DETAILS
Home Care:   1. Take medication as prescribed by your primary care physician.   2.  You were tested for Covid, positive  3. Use saline mist for sinus congestion. Tylenol or Ibuprofen for fever, and pain.  4. You need to quarantine for 5 days from symptom onset, if significantly improved on day 6, and fever free for 24 hours without fever reducing medications, you may end quarantining but wear a mask for an additional 5 days.    Call your doctor, 911 or go to the emergency department if worse or:   1. If you have wheezing or feel short of breath.  2. Breathing is difficult or too fast.   3. There are retractions (the skin between or under the ribs sucks in when breathing).   4. Chest pain occurs.   5. Drowsy or sleepy.   6. Pale color or blue/gray color is seen in the lips or fingernails (call 911).   7. Drooling or difficulty swallowing or handling saliva.   8. Fever (temperature greater than 102°F [39°C]) occurs.   9. There is blood in the stool (poop) or diarrhea or if the stool (poop) is black.   10. Lots of diarrhea occurs for more than 3 days.   11. Lots of vomiting occurs or the vomit is bloody or green or looks like chocolate or coffee.   12. Symptoms of dehydration occurs (eg, inside of the mouth looks sticky, urinating less, weakness, tiredness, pale color, eyes look hollow or sunken).   13. If you have worsening abdominal pain.  14. Unable to eat or drink.    Jazz Valenzuela MD, PGY3:  signed out to me pending UA, CXR showing fluid overload and b/l opacities suspicious for PNA. Nephrology aware. pt to get HD tomorrow. Pt also to be admitted for MRI for eval of possible stroke.

## 2024-10-21 NOTE — OCCUPATIONAL THERAPY INITIAL EVALUATION ADULT - RUE MMT, REHAB EVAL
[facility]    10/21/2024 6:24 PM        Name: Jelani Martínez .              Admitted: 10/15/2024  Primary Care Provider: Ignacio Cortez MD (Tel: [phone])      Hospital course: 83 yo male presented from nursing home with altered mental status. He was reported to be talkative that morning then noted to have periods of decreased responsiveness. He was hypotensive on presentation. Admitted with SBO, sepsis and acute renal failure.He was initially admitted to ICU with pressor support, transferred out of unit 10/16.      Subjective: Patient more alert today mumbles some words but no comprehensive speech no fevers overnight    Current Medications  dextrose 5 % solution, Continuous  potassium bicarb-citric acid (EFFER-K) effervescent tablet 40 mEq, Once  sodium chloride flush 0.9 % injection 5-40 mL, 2 times per day  sodium chloride flush 0.9 % injection 5-40 mL, PRN  0.9 % sodium chloride infusion, PRN  lidocaine PF 1 % injection 50 mg, Once  meropenem (MERREM) 1,000 mg in sodium chloride 0.9 % 100 mL IVPB (mini-bag), Q12H  micafungin (MYCAMINE) 100 mg in sodium chloride 0.9 % 100 mL IVPB, Daily  labetalol (NORMODYNE;TRANDATE) injection 5 mg, Q6H  labetalol (NORMODYNE;TRANDATE) injection 5 mg, Q4H PRN  OLANZapine (ZyPREXA) 5 mg in sterile water 1 mL injection, Daily  hydrALAZINE (APRESOLINE) injection 5 mg, Q4H PRN  Polyvinyl Alcohol-Povidone PF (REFRESH) 1.4-0.6 % ophthalmic solution 2 drop, TID  dorzolamide (TRUSOPT) 2 % ophthalmic solution 1 drop, BID   And  timolol (TIMOPTIC) 0.5 % ophthalmic solution 1 drop, BID  phenol 1.4 % mouth spray 1 spray, Q2H PRN  aspirin chewable tablet 81 mg, Daily  atorvastatin (LIPITOR) tablet 80 mg, Nightly  brimonidine (ALPHAGAN) 0.2 % ophthalmic solution 1 drop, BID  latanoprost (XALATAN) 0.005 % ophthalmic solution 1 drop, Nightly  melatonin tablet 9 mg, Nightly  4/5

## 2024-10-30 ENCOUNTER — INPATIENT (INPATIENT)
Facility: HOSPITAL | Age: 69
LOS: 7 days | Discharge: NURSING, REHAB OR LONG-TERM CARE | End: 2024-11-07
Attending: INTERNAL MEDICINE | Admitting: INTERNAL MEDICINE
Payer: MEDICAID

## 2024-10-30 VITALS
OXYGEN SATURATION: 95 % | HEART RATE: 73 BPM | RESPIRATION RATE: 17 BRPM | SYSTOLIC BLOOD PRESSURE: 126 MMHG | TEMPERATURE: 98 F | DIASTOLIC BLOOD PRESSURE: 63 MMHG

## 2024-10-30 DIAGNOSIS — Z89.611 ACQUIRED ABSENCE OF RIGHT LEG ABOVE KNEE: Chronic | ICD-10-CM

## 2024-10-30 DIAGNOSIS — Z89.511 ACQUIRED ABSENCE OF RIGHT LEG BELOW KNEE: Chronic | ICD-10-CM

## 2024-10-30 DIAGNOSIS — Z98.890 OTHER SPECIFIED POSTPROCEDURAL STATES: Chronic | ICD-10-CM

## 2024-10-30 DIAGNOSIS — Z89.612 ACQUIRED ABSENCE OF LEFT LEG ABOVE KNEE: Chronic | ICD-10-CM

## 2024-10-30 LAB
ADD ON TEST-SPECIMEN IN LAB: SIGNIFICANT CHANGE UP
ALBUMIN SERPL ELPH-MCNC: 3.3 G/DL — SIGNIFICANT CHANGE UP (ref 3.3–5)
ALP SERPL-CCNC: 101 U/L — SIGNIFICANT CHANGE UP (ref 40–120)
ALT FLD-CCNC: 8 U/L — SIGNIFICANT CHANGE UP (ref 4–41)
ANION GAP SERPL CALC-SCNC: 14 MMOL/L — SIGNIFICANT CHANGE UP (ref 7–14)
APTT BLD: 41.9 SEC — HIGH (ref 24.5–35.6)
AST SERPL-CCNC: 24 U/L — SIGNIFICANT CHANGE UP (ref 4–40)
BASOPHILS # BLD AUTO: 0.02 K/UL — SIGNIFICANT CHANGE UP (ref 0–0.2)
BASOPHILS NFR BLD AUTO: 0.3 % — SIGNIFICANT CHANGE UP (ref 0–2)
BILIRUB SERPL-MCNC: 0.5 MG/DL — SIGNIFICANT CHANGE UP (ref 0.2–1.2)
BLD GP AB SCN SERPL QL: NEGATIVE — SIGNIFICANT CHANGE UP
BLOOD GAS VENOUS COMPREHENSIVE RESULT: SIGNIFICANT CHANGE UP
BUN SERPL-MCNC: 21 MG/DL — SIGNIFICANT CHANGE UP (ref 7–23)
CALCIUM SERPL-MCNC: 8.9 MG/DL — SIGNIFICANT CHANGE UP (ref 8.4–10.5)
CHLORIDE SERPL-SCNC: 98 MMOL/L — SIGNIFICANT CHANGE UP (ref 98–107)
CO2 SERPL-SCNC: 27 MMOL/L — SIGNIFICANT CHANGE UP (ref 22–31)
CREAT SERPL-MCNC: 3.46 MG/DL — HIGH (ref 0.5–1.3)
EGFR: 18 ML/MIN/1.73M2 — LOW
EOSINOPHIL # BLD AUTO: 0.02 K/UL — SIGNIFICANT CHANGE UP (ref 0–0.5)
EOSINOPHIL NFR BLD AUTO: 0.3 % — SIGNIFICANT CHANGE UP (ref 0–6)
GLUCOSE SERPL-MCNC: 141 MG/DL — HIGH (ref 70–99)
HCT VFR BLD CALC: 34.9 % — LOW (ref 39–50)
HGB BLD-MCNC: 10.4 G/DL — LOW (ref 13–17)
IANC: 5.95 K/UL — SIGNIFICANT CHANGE UP (ref 1.8–7.4)
IMM GRANULOCYTES NFR BLD AUTO: 0.4 % — SIGNIFICANT CHANGE UP (ref 0–0.9)
INR BLD: 1.08 RATIO — SIGNIFICANT CHANGE UP (ref 0.85–1.16)
LIDOCAIN IGE QN: 55 U/L — SIGNIFICANT CHANGE UP (ref 7–60)
LYMPHOCYTES # BLD AUTO: 0.68 K/UL — LOW (ref 1–3.3)
LYMPHOCYTES # BLD AUTO: 9 % — LOW (ref 13–44)
MAGNESIUM SERPL-MCNC: 2.1 MG/DL — SIGNIFICANT CHANGE UP (ref 1.6–2.6)
MCHC RBC-ENTMCNC: 26.1 PG — LOW (ref 27–34)
MCHC RBC-ENTMCNC: 29.8 G/DL — LOW (ref 32–36)
MCV RBC AUTO: 87.5 FL — SIGNIFICANT CHANGE UP (ref 80–100)
MONOCYTES # BLD AUTO: 0.83 K/UL — SIGNIFICANT CHANGE UP (ref 0–0.9)
MONOCYTES NFR BLD AUTO: 11 % — SIGNIFICANT CHANGE UP (ref 2–14)
NEUTROPHILS # BLD AUTO: 5.95 K/UL — SIGNIFICANT CHANGE UP (ref 1.8–7.4)
NEUTROPHILS NFR BLD AUTO: 79 % — HIGH (ref 43–77)
NRBC # BLD: 0 /100 WBCS — SIGNIFICANT CHANGE UP (ref 0–0)
NRBC # FLD: 0 K/UL — SIGNIFICANT CHANGE UP (ref 0–0)
PLATELET # BLD AUTO: 210 K/UL — SIGNIFICANT CHANGE UP (ref 150–400)
POTASSIUM SERPL-MCNC: 3.4 MMOL/L — LOW (ref 3.5–5.3)
POTASSIUM SERPL-SCNC: 3.4 MMOL/L — LOW (ref 3.5–5.3)
PROT SERPL-MCNC: 8.5 G/DL — HIGH (ref 6–8.3)
PROTHROM AB SERPL-ACNC: 12.8 SEC — SIGNIFICANT CHANGE UP (ref 9.9–13.4)
RBC # BLD: 3.99 M/UL — LOW (ref 4.2–5.8)
RBC # FLD: 18 % — HIGH (ref 10.3–14.5)
RH IG SCN BLD-IMP: POSITIVE — SIGNIFICANT CHANGE UP
SODIUM SERPL-SCNC: 139 MMOL/L — SIGNIFICANT CHANGE UP (ref 135–145)
TROPONIN T, HIGH SENSITIVITY RESULT: 622 NG/L — CRITICAL HIGH
WBC # BLD: 7.53 K/UL — SIGNIFICANT CHANGE UP (ref 3.8–10.5)
WBC # FLD AUTO: 7.53 K/UL — SIGNIFICANT CHANGE UP (ref 3.8–10.5)

## 2024-10-30 PROCEDURE — 99291 CRITICAL CARE FIRST HOUR: CPT

## 2024-10-30 PROCEDURE — 71045 X-RAY EXAM CHEST 1 VIEW: CPT | Mod: 26

## 2024-10-30 NOTE — ED ADULT TRIAGE NOTE - CHIEF COMPLAINT QUOTE
awake from hemo dialysis sent increasing weakness  not answering questions poorly responsive in triage   left  AVG  hx ESRD   right BKA  left AKA   no t speaking or able to follow any directions unable to complete bfast

## 2024-10-30 NOTE — ED PROVIDER NOTE - CLINICAL SUMMARY MEDICAL DECISION MAKING FREE TEXT BOX
Mr. Sim is a 69 y.o. chronically ill man residing at [facility] with PMH ESRD on HD M/W/F, multiple CVAs with residual L eye left gaze deviation, s/p R BKA and L AKA, L AVF, HTN, HLD, CAD, CVA, dementia (AOx2-3 at baseline per niece) who was brought in from HD due to unresponsiveness, without completing full session. Pt obtunded on exam, minimally responsive to pain, unable to verbalize needs or follow commands. FS 120s. Differential diagnosis is broad including toxic metabolic encephalopathy, CVA, seizure. CBC, CMP, VBG, BCx, CTH, CT Chest/Abdomen/Pelvis. Will require admission.

## 2024-10-30 NOTE — ED PROVIDER NOTE - PROGRESS NOTE DETAILS
Troponin 622. EKG w/ old T wave inversions however new changes in V3, c/f possible NSTEMI. EKG repeated with similar findings. Pending CT and will start heparin gtt. Patient examined at bedside with nephew present. Pt alert, more responsive, able to answer yes/no/"I don't know" to questions. Recalls going to HD earlier today but cannot give more history than this. Cardiology at bedside evaluating. Pending imaging. S/p imaging. Intermittently making noises and mumbling. Appears comfortable. 3 cm L pneumothorax noted on CXR, will consider thoracic surgery eval. Pneumothorax also noted on CT Chest, stable in size from CXR per comparison by radiology. Pt remains comfortable. Pt placed on NRB and will repeat CXR, may consider intervention pending discussion with family. Received in signout pending admission for altered mental status patient does have a pneumothorax he is hemodynamically stable on room air will place on nonrebreather with plan to have pulm see him in the a.m.  Will repeat an x-ray to ensure it is not worsening did call radiology to compare the chest x-ray to the CAT scan and they state there is no overt change.  Of note patient has a history of left-sided pleural effusions and his last thora was in June.  Did try to get in touch with the family to update them message left. Do not feel chest tube is necessary at this time. Received in signout pending admission for altered mental status patient does have a pneumothorax ?pneumo ex vacuo? he is hemodynamically stable on room air will place on nonrebreather with plan to have pulm see him in the a.m.  Will repeat an x-ray to ensure it is not worsening did call radiology to compare the chest x-ray to the CAT scan and they state there is no overt change.  Of note patient has a history of left-sided pleural effusions and his last thora was in June.  Did try to get in touch with the family to update them message left. Do not feel chest tube is necessary at this time.

## 2024-10-30 NOTE — ED PROVIDER NOTE - OBJECTIVE STATEMENT
Mr. Sim is a 69 y.o. man residing at [facility] with PMH ESRD on HD M/W/F, multiple CVAs with residual L eye left gaze deviation, s/p R BKA and L AKA, L AVF, HTN, HLD, CAD, CVA, dementia (AOx2-3 at baseline per niece), brought in from HD after becoming unresponsive. Patient obtunded and unable to provide history. Per niece Ramonita, her and her brother most recently saw the patient on 10/19 and he was at his baseline w/o concerns. Yesterday, she received a call from [facility] that the pt had multiple episodes of vomiting and increased cough, and appeared more lethargic than usual prompting a "downgrade in his diet" from soft mechanical to honey-thickened. His last known normal is unknown.

## 2024-10-30 NOTE — ED PROVIDER NOTE - PHYSICAL EXAMINATION
VITALS:   T(C): 36.4 (10-30-24 @ 21:15), Max: 36.4 (10-30-24 @ 21:15)  HR: 73 (10-30-24 @ 21:15) (73 - 73)  BP: 126/63 (10-30-24 @ 21:15) (126/63 - 126/63)  RR: 17 (10-30-24 @ 21:15) (17 - 17)  SpO2: 95% (10-30-24 @ 21:15) (95% - 95%)    GENERAL: Obtunded, non-responsive to pain   HEAD:  Atraumatic, normocephalic  EYES: Chronic left deviation of L eye  ENT: Moist mucous membranes  NECK: Supple, no JVD  HEART: Regular rate and rhythm, no murmurs, rubs, or gallops  LUNGS: Unlabored respirations.  Clear to auscultation bilaterally, no crackles, wheezing, or rhonchi  ABDOMEN: Soft  EXTREMITIES: S/p L BKA and R AKA, extremities WWP  NERVOUS SYSTEM:  A&Ox0  SKIN: Sacral wound present

## 2024-10-30 NOTE — ED ADULT NURSE NOTE - OBJECTIVE STATEMENT
pt presenting to ER from HD center for AMS.  hx of CVA with residual weakness.  per family, pt a&o2-3 at baseline.  on arrival, pt not responding to verbal stimuli, appears obtunded.  placed on CM, vitals stable.  MD at bedside.  will cont to monitor.
Walk in

## 2024-10-30 NOTE — ED PROVIDER NOTE - ATTENDING CONTRIBUTION TO CARE
I have discussed the patient's case presentation with resident. I have also personally performed a face-to-face evaluation of the patient. I agree with the resident's assessment and plan.    Patient is entirely obtunded. per d/w family over phone failiar with him, at baseline he is AOx1-2 and is able to have minimal conversation.    CC time: Troponin far elevated above baseline; EKG shows new STEs isolated to V3 lead, but concerning for evolving EKG changes possible NSTEMI or STEMI

## 2024-10-30 NOTE — ED ADULT NURSE NOTE - CAS EDN DISCHARGE INTERVENTIONS
Discharge Summary - [facility] Internal Medicine    Patient Information: Skip Lu 63 y o  male MRN: [id number]  Unit/Bed#: -01 Encounter: [id number]    Discharging Physician / Practitioner: Jordan Quintana MD  PCP: No primary care provider on file  Admission Date: 9/7/2018  Discharge Date: 09/10/18    Disposition:     Home     Reason for Admission:  Chest pain    Discharge Diagnoses:     Principal Problem:    Chest pain  Active Problems:    Hypertension    Hypokalemia    GERD (gastroesophageal reflux disease)  Resolved Problems:    * No resolved hospital problems  *      Consultations During Hospital Stay:  · Cardiology    Procedures Performed:     ·     Significant Findings / Test Results:     · Nuclear stress test:  Negative    Incidental Findings:       Test Results Pending at Discharge (will require follow up):   ·      Outpatient Tests Requested:  ·     Complications:  None    Hospital Course:     Skip Lu is a 63 y o  male patient who originally presented to the hospital on 9/7/2018 due to chest pain, patient was placed on telemetry ruling out acute coronary syndrome, negative troponin, no EKG changes, no telemetry changes, patient had no breath stress test which he was negative  Patient will be discharged home on current medication  Condition at Discharge: stable     Discharge Day Visit / Exam:     * Please refer to separate progress note for these details *    Discussion with Family:       Discharge instructions/Information to patient and family:   See after visit summary for information provided to patient and family  Provisions for Follow-Up Care:  See after visit summary for information related to follow-up care and any pertinent home health orders  Planned Readmission:     Discharge Statement:  I spent 30  minutes discharging the patient  This time was spent on the day of discharge  I had direct contact with the patient on the day of discharge   Greater than 50% of the total time was spent examining patient, answering all patient questions, arranging and discussing plan of care with patient as well as directly providing post-discharge instructions  Additional time then spent on discharge activities  Discharge Medications:  See after visit summary for reconciled discharge medications provided to patient and family  ** Please Note: This note has been constructed using a voice recognition system   ** Arm band on/IV intact/Admission wristband placed

## 2024-10-30 NOTE — ED PROVIDER NOTE - NSICDXPASTMEDICALHX_GEN_ALL_CORE_FT
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

## 2024-10-31 DIAGNOSIS — J96.01 ACUTE RESPIRATORY FAILURE WITH HYPOXIA: ICD-10-CM

## 2024-10-31 DIAGNOSIS — N18.6 END STAGE RENAL DISEASE: ICD-10-CM

## 2024-10-31 DIAGNOSIS — I10 ESSENTIAL (PRIMARY) HYPERTENSION: ICD-10-CM

## 2024-10-31 DIAGNOSIS — D63.8 ANEMIA IN OTHER CHRONIC DISEASES CLASSIFIED ELSEWHERE: ICD-10-CM

## 2024-10-31 DIAGNOSIS — F01.50 VASCULAR DEMENTIA, UNSPECIFIED SEVERITY, WITHOUT BEHAVIORAL DISTURBANCE, PSYCHOTIC DISTURBANCE, MOOD DISTURBANCE, AND ANXIETY: ICD-10-CM

## 2024-10-31 DIAGNOSIS — R41.82 ALTERED MENTAL STATUS, UNSPECIFIED: ICD-10-CM

## 2024-10-31 DIAGNOSIS — I73.9 PERIPHERAL VASCULAR DISEASE, UNSPECIFIED: ICD-10-CM

## 2024-10-31 DIAGNOSIS — Z29.9 ENCOUNTER FOR PROPHYLACTIC MEASURES, UNSPECIFIED: ICD-10-CM

## 2024-10-31 DIAGNOSIS — R79.89 OTHER SPECIFIED ABNORMAL FINDINGS OF BLOOD CHEMISTRY: ICD-10-CM

## 2024-10-31 DIAGNOSIS — G93.41 METABOLIC ENCEPHALOPATHY: ICD-10-CM

## 2024-10-31 DIAGNOSIS — J93.9 PNEUMOTHORAX, UNSPECIFIED: ICD-10-CM

## 2024-10-31 PROBLEM — N18.9 CHRONIC KIDNEY DISEASE, UNSPECIFIED: Chronic | Status: INACTIVE | Noted: 2018-05-19 | Resolved: 2024-10-30

## 2024-10-31 LAB
ADD ON TEST-SPECIMEN IN LAB: SIGNIFICANT CHANGE UP
ALBUMIN SERPL ELPH-MCNC: 3 G/DL — LOW (ref 3.3–5)
ALP SERPL-CCNC: 90 U/L — SIGNIFICANT CHANGE UP (ref 40–120)
ALT FLD-CCNC: 9 U/L — SIGNIFICANT CHANGE UP (ref 4–41)
ANION GAP SERPL CALC-SCNC: 19 MMOL/L — HIGH (ref 7–14)
ANISOCYTOSIS BLD QL: SIGNIFICANT CHANGE UP
AST SERPL-CCNC: 21 U/L — SIGNIFICANT CHANGE UP (ref 4–40)
BASE EXCESS BLDA CALC-SCNC: 2 MMOL/L — SIGNIFICANT CHANGE UP (ref -2–3)
BASOPHILS # BLD AUTO: 0 K/UL — SIGNIFICANT CHANGE UP (ref 0–0.2)
BASOPHILS NFR BLD AUTO: 0 % — SIGNIFICANT CHANGE UP (ref 0–2)
BILIRUB SERPL-MCNC: 0.5 MG/DL — SIGNIFICANT CHANGE UP (ref 0.2–1.2)
BUN SERPL-MCNC: 28 MG/DL — HIGH (ref 7–23)
CALCIUM SERPL-MCNC: 8.6 MG/DL — SIGNIFICANT CHANGE UP (ref 8.4–10.5)
CHLORIDE SERPL-SCNC: 95 MMOL/L — LOW (ref 98–107)
CO2 BLDA-SCNC: 29 MMOL/L — HIGH (ref 19–24)
CO2 SERPL-SCNC: 21 MMOL/L — LOW (ref 22–31)
CREAT SERPL-MCNC: 3.94 MG/DL — HIGH (ref 0.5–1.3)
EGFR: 16 ML/MIN/1.73M2 — LOW
EOSINOPHIL # BLD AUTO: 0.06 K/UL — SIGNIFICANT CHANGE UP (ref 0–0.5)
EOSINOPHIL NFR BLD AUTO: 0.9 % — SIGNIFICANT CHANGE UP (ref 0–6)
GAS PNL BLDV: SIGNIFICANT CHANGE UP
GAS PNL BLDV: SIGNIFICANT CHANGE UP
GLUCOSE BLDC GLUCOMTR-MCNC: 129 MG/DL — HIGH (ref 70–99)
GLUCOSE BLDC GLUCOMTR-MCNC: 140 MG/DL — HIGH (ref 70–99)
GLUCOSE SERPL-MCNC: 139 MG/DL — HIGH (ref 70–99)
HCO3 BLDA-SCNC: 28 MMOL/L — SIGNIFICANT CHANGE UP (ref 21–28)
HCT VFR BLD CALC: 32.4 % — LOW (ref 39–50)
HGB BLD-MCNC: 10.3 G/DL — LOW (ref 13–17)
HOROWITZ INDEX BLDA+IHG-RTO: 21 — SIGNIFICANT CHANGE UP
IANC: 5.53 K/UL — SIGNIFICANT CHANGE UP (ref 1.8–7.4)
LYMPHOCYTES # BLD AUTO: 0.11 K/UL — LOW (ref 1–3.3)
LYMPHOCYTES # BLD AUTO: 1.8 % — LOW (ref 13–44)
MACROCYTES BLD QL: SLIGHT — SIGNIFICANT CHANGE UP
MAGNESIUM SERPL-MCNC: 1.9 MG/DL — SIGNIFICANT CHANGE UP (ref 1.6–2.6)
MANUAL SMEAR VERIFICATION: SIGNIFICANT CHANGE UP
MCHC RBC-ENTMCNC: 26.8 PG — LOW (ref 27–34)
MCHC RBC-ENTMCNC: 31.8 G/DL — LOW (ref 32–36)
MCV RBC AUTO: 84.4 FL — SIGNIFICANT CHANGE UP (ref 80–100)
MICROCYTES BLD QL: SLIGHT — SIGNIFICANT CHANGE UP
MONOCYTES # BLD AUTO: 0.5 K/UL — SIGNIFICANT CHANGE UP (ref 0–0.9)
MONOCYTES NFR BLD AUTO: 7.9 % — SIGNIFICANT CHANGE UP (ref 2–14)
NEUTROPHILS # BLD AUTO: 5.5 K/UL — SIGNIFICANT CHANGE UP (ref 1.8–7.4)
NEUTROPHILS NFR BLD AUTO: 77.2 % — HIGH (ref 43–77)
NEUTS BAND # BLD: 9.6 % — HIGH (ref 0–6)
OVALOCYTES BLD QL SMEAR: SLIGHT — SIGNIFICANT CHANGE UP
PCO2 BLDA: 47 MMHG — SIGNIFICANT CHANGE UP (ref 35–48)
PH BLDA: 7.38 — SIGNIFICANT CHANGE UP (ref 7.35–7.45)
PHOSPHATE SERPL-MCNC: 1.3 MG/DL — LOW (ref 2.5–4.5)
PHOSPHATE SERPL-MCNC: 4 MG/DL — SIGNIFICANT CHANGE UP (ref 2.5–4.5)
PLAT MORPH BLD: NORMAL — SIGNIFICANT CHANGE UP
PLATELET # BLD AUTO: 206 K/UL — SIGNIFICANT CHANGE UP (ref 150–400)
PLATELET COUNT - ESTIMATE: NORMAL — SIGNIFICANT CHANGE UP
PO2 BLDA: 99 MMHG — SIGNIFICANT CHANGE UP (ref 83–108)
POIKILOCYTOSIS BLD QL AUTO: SIGNIFICANT CHANGE UP
POLYCHROMASIA BLD QL SMEAR: SIGNIFICANT CHANGE UP
POTASSIUM SERPL-MCNC: 3.5 MMOL/L — SIGNIFICANT CHANGE UP (ref 3.5–5.3)
POTASSIUM SERPL-SCNC: 3.5 MMOL/L — SIGNIFICANT CHANGE UP (ref 3.5–5.3)
PROT SERPL-MCNC: 7.9 G/DL — SIGNIFICANT CHANGE UP (ref 6–8.3)
RBC # BLD: 3.84 M/UL — LOW (ref 4.2–5.8)
RBC # FLD: 17.6 % — HIGH (ref 10.3–14.5)
RBC BLD AUTO: ABNORMAL
SAO2 % BLDA: 97.9 % — SIGNIFICANT CHANGE UP (ref 94–98)
SODIUM SERPL-SCNC: 135 MMOL/L — SIGNIFICANT CHANGE UP (ref 135–145)
SPHEROCYTES BLD QL SMEAR: SLIGHT — SIGNIFICANT CHANGE UP
TROPONIN T, HIGH SENSITIVITY RESULT: 506 NG/L — CRITICAL HIGH
TROPONIN T, HIGH SENSITIVITY RESULT: 604 NG/L — CRITICAL HIGH
VARIANT LYMPHS # BLD: 2.6 % — SIGNIFICANT CHANGE UP (ref 0–6)
WBC # BLD: 6.34 K/UL — SIGNIFICANT CHANGE UP (ref 3.8–10.5)
WBC # FLD AUTO: 6.34 K/UL — SIGNIFICANT CHANGE UP (ref 3.8–10.5)

## 2024-10-31 PROCEDURE — 99223 1ST HOSP IP/OBS HIGH 75: CPT | Mod: 57

## 2024-10-31 PROCEDURE — 99497 ADVNCD CARE PLAN 30 MIN: CPT | Mod: 25

## 2024-10-31 PROCEDURE — 99223 1ST HOSP IP/OBS HIGH 75: CPT

## 2024-10-31 PROCEDURE — 76604 US EXAM CHEST: CPT | Mod: 26

## 2024-10-31 PROCEDURE — 71045 X-RAY EXAM CHEST 1 VIEW: CPT | Mod: 26

## 2024-10-31 PROCEDURE — 74177 CT ABD & PELVIS W/CONTRAST: CPT | Mod: 26,MC

## 2024-10-31 PROCEDURE — 70450 CT HEAD/BRAIN W/O DYE: CPT | Mod: 26,MC

## 2024-10-31 PROCEDURE — 99222 1ST HOSP IP/OBS MODERATE 55: CPT

## 2024-10-31 PROCEDURE — 93308 TTE F-UP OR LMTD: CPT | Mod: 26

## 2024-10-31 PROCEDURE — 71260 CT THORAX DX C+: CPT | Mod: 26,MC

## 2024-10-31 RX ORDER — NYSTATIN 100000 U/G
0 POWDER TOPICAL
Refills: 0 | DISCHARGE

## 2024-10-31 RX ORDER — HEPARIN SODIUM 10000 [USP'U]/ML
5000 INJECTION INTRAVENOUS; SUBCUTANEOUS EVERY 12 HOURS
Refills: 0 | Status: DISCONTINUED | OUTPATIENT
Start: 2024-10-31 | End: 2024-11-07

## 2024-10-31 RX ORDER — SODIUM CHLORIDE 9 MG/ML
500 INJECTION, SOLUTION INTRAMUSCULAR; INTRAVENOUS; SUBCUTANEOUS ONCE
Refills: 0 | Status: COMPLETED | OUTPATIENT
Start: 2024-10-31 | End: 2024-10-31

## 2024-10-31 RX ORDER — LACTOBACILLUS ACIDOPHILUS 25MM CELL
1 CAPSULE ORAL DAILY
Refills: 0 | Status: DISCONTINUED | OUTPATIENT
Start: 2024-10-31 | End: 2024-11-07

## 2024-10-31 RX ORDER — SENNA 187 MG
2 TABLET ORAL AT BEDTIME
Refills: 0 | Status: DISCONTINUED | OUTPATIENT
Start: 2024-10-31 | End: 2024-11-02

## 2024-10-31 RX ORDER — B-COMPLEX WITH VITAMIN C
1 VIAL (ML) INJECTION DAILY
Refills: 0 | Status: DISCONTINUED | OUTPATIENT
Start: 2024-10-31 | End: 2024-11-07

## 2024-10-31 RX ORDER — PANTOPRAZOLE SODIUM 40 MG/1
40 TABLET, DELAYED RELEASE ORAL
Refills: 0 | Status: DISCONTINUED | OUTPATIENT
Start: 2024-10-31 | End: 2024-11-07

## 2024-10-31 RX ORDER — PIPERACILLIN AND TAZOBACTAM .5; 4 G/20ML; G/20ML
3.38 INJECTION, POWDER, LYOPHILIZED, FOR SOLUTION INTRAVENOUS EVERY 12 HOURS
Refills: 0 | Status: COMPLETED | OUTPATIENT
Start: 2024-10-31 | End: 2024-11-05

## 2024-10-31 RX ORDER — ACETAMINOPHEN 500 MG
650 TABLET ORAL EVERY 6 HOURS
Refills: 0 | Status: DISCONTINUED | OUTPATIENT
Start: 2024-10-31 | End: 2024-11-07

## 2024-10-31 RX ORDER — PIPERACILLIN AND TAZOBACTAM .5; 4 G/20ML; G/20ML
3.38 INJECTION, POWDER, LYOPHILIZED, FOR SOLUTION INTRAVENOUS ONCE
Refills: 0 | Status: COMPLETED | OUTPATIENT
Start: 2024-10-31 | End: 2024-10-31

## 2024-10-31 RX ORDER — POLYETHYLENE GLYCOL 3350 17 G/17G
17 POWDER, FOR SOLUTION ORAL DAILY
Refills: 0 | Status: DISCONTINUED | OUTPATIENT
Start: 2024-10-31 | End: 2024-11-02

## 2024-10-31 RX ORDER — ASPIRIN/MAG CARB/ALUMINUM AMIN 325 MG
81 TABLET ORAL DAILY
Refills: 0 | Status: DISCONTINUED | OUTPATIENT
Start: 2024-10-31 | End: 2024-11-07

## 2024-10-31 RX ORDER — MIDODRINE HYDROCHLORIDE 2.5 MG/1
7.5 TABLET ORAL
Refills: 0 | Status: DISCONTINUED | OUTPATIENT
Start: 2024-10-31 | End: 2024-11-07

## 2024-10-31 RX ADMIN — Medication 1 TABLET(S): at 18:03

## 2024-10-31 RX ADMIN — Medication 81 MILLIGRAM(S): at 18:02

## 2024-10-31 RX ADMIN — SODIUM CHLORIDE 500 MILLILITER(S): 9 INJECTION, SOLUTION INTRAMUSCULAR; INTRAVENOUS; SUBCUTANEOUS at 08:20

## 2024-10-31 RX ADMIN — Medication 40 MILLIGRAM(S): at 22:17

## 2024-10-31 RX ADMIN — POLYETHYLENE GLYCOL 3350 17 GRAM(S): 17 POWDER, FOR SOLUTION ORAL at 18:03

## 2024-10-31 RX ADMIN — Medication 10 MILLIGRAM(S): at 07:36

## 2024-10-31 RX ADMIN — HEPARIN SODIUM 5000 UNIT(S): 10000 INJECTION INTRAVENOUS; SUBCUTANEOUS at 18:02

## 2024-10-31 RX ADMIN — Medication 1 DROP(S): at 22:16

## 2024-10-31 RX ADMIN — PIPERACILLIN AND TAZOBACTAM 200 GRAM(S): .5; 4 INJECTION, POWDER, LYOPHILIZED, FOR SOLUTION INTRAVENOUS at 07:36

## 2024-10-31 RX ADMIN — PIPERACILLIN AND TAZOBACTAM 25 GRAM(S): .5; 4 INJECTION, POWDER, LYOPHILIZED, FOR SOLUTION INTRAVENOUS at 19:07

## 2024-10-31 NOTE — H&P ADULT - CONVERSATION DETAILS
Discussed recent events and active medical problems with patient's HCP, Ramonita Vincent (niece), who is a RN and also addressed Code Status with her. At this time she wants everything to be done to his uncle. Patient remains FULL CODE at this time.

## 2024-10-31 NOTE — H&P ADULT - PROBLEM SELECTOR PLAN 10
Heparin SQ  PT evaluation  FULL CODE per GO    Discussed/updated Ramonita Razo ([facility]) on 10/31.

## 2024-10-31 NOTE — H&P ADULT - PROBLEM SELECTOR PLAN 3
CT Chest: Small right effusion and trace left effusion. Moderate left pneumothorax. No mediastinal shift  - Consulted CTS, apprec recs  - To review case and radiology with CTS Attending, will f/up further recs  - On NRM, trial of NC 6L min, wean off O2 as tolerated,  CT Chest: Small right effusion and trace left effusion. Moderate left pneumothorax. No mediastinal shift  - Consulted CTS, apprec recs  - Pt w/ multiple Thoracentesis on Left side in past. Possible hydroptx from recurrent effusions  - If patient is requiring PTC, recommend IR consult  - Will f/up with Pulmonary regarding need to place chest tube  - On NRM, trial of NC 6L min, wean off O2 as tolerated  - Continuous pulse-oximetry

## 2024-10-31 NOTE — PROGRESS NOTE ADULT - SUBJECTIVE AND OBJECTIVE BOX
HPI (ED):  Mr. Sim is a 69 y.o. man residing at [facility] with PMH ESRD on HD M/W/F, multiple CVAs with residual L eye left gaze deviation, s/p R BKA and L AKA, L AVF, HTN, HLD, CAD, CVA, dementia (AOx2-3 at baseline per niece), brought in from HD after becoming unresponsive. Patient obtunded and unable to provide history. Per niece Ramonita, her and her brother most recently saw the patient on 10/19 and he was at his baseline w/o concerns. Yesterday, she received a call from [facility] that the pt had multiple episodes of vomiting and increased cough, and appeared more lethargic than usual prompting a "downgrade in his diet" from soft mechanical to honey-thickened. His last known normal is unknown.    Thoracic:  68 y/o male with significant pmhx admitted to ER from HD after being obtunded. Patient seen and examined at bedside by Thoracic team. Had Rapid Response this morning for hypotension. Had CT chest done, found to have a moderate Left sided PTX, concerning for hydroptx. Patient has had multiple thoracentesis on the left side for pleural effusions, each CT showing trapped lung without full re-expansion. Patient on 10L NRB, satting 100%, in NAD. He is responsive to his name and questions, but cannot provide history.       PAST MEDICAL & SURGICAL HISTORY:  DM (diabetes mellitus)      HTN (hypertension)      Below knee amputation status, right      MRSA (methicillin resistant Staphylococcus aureus) colonization  (hx/o MRSA growth from wound culture)      Wound  (chronic wounds to left foot and leg)      DM (diabetes mellitus)      HTN (hypertension)      Kidney disease      Stroke      ESRD on dialysis      S/P BKA (below knee amputation) unilateral, right      H/O peripheral artery bypass  left fem to below-knee pop with RSVG      Amputated left leg  above knee      Amputated right leg  below knee          REVIEW OF SYSTEMS  As reviewed in Medicine HPI    MEDICATIONS  (STANDING):    MEDICATIONS  (PRN):      Allergies    No Known Allergies    Intolerances        SOCIAL HISTORY:  Occupation:  Smoking Hx:   Etoh Hx:   IVDA Hx:     FAMILY HISTORY:  Family history of diabetes mellitus        Vital Signs Last 24 Hrs  T(C): 36.4 (31 Oct 2024 09:10), Max: 37 (31 Oct 2024 00:30)  T(F): 97.6 (31 Oct 2024 09:10), Max: 98.6 (31 Oct 2024 00:30)  HR: 74 (31 Oct 2024 10:26) (66 - 80)  BP: 149/84 (31 Oct 2024 10:26) (79/45 - 149/84)  BP(mean): 71 (31 Oct 2024 01:01) (63 - 71)  RR: 20 (31 Oct 2024 10:26) (17 - 25)  SpO2: 100% (31 Oct 2024 10:26) (95% - 100%)    Parameters below as of 31 Oct 2024 10:26  Patient On (Oxygen Delivery Method): mask, nonrebreather  O2 Flow (L/min): 10      PHYSICAL EXAM:  General: appears stated age, on NRB  Neck: trachea midline  Respiratory: Breathing comfortably on NRB satting 100% at bedside, no accessory muscle use  Gastrointestinal: Soft, NT, ND, +BS  Extremities: ARIAS x4  Vascular: Well perfused  Neurological: Nonfocal, no deficits  Psychiatric: Appropriate affect        LABS:                        10.4   7.53  )-----------( 210      ( 30 Oct 2024 22:10 )             34.9     10-30    139  |  98  |  21  ----------------------------<  141[H]  3.4[L]   |  27  |  3.46[H]    Ca    8.9      30 Oct 2024 22:10  Phos  TNP     10-31  Mg     2.10     10-30    TPro  8.5[H]  /  Alb  3.3  /  TBili  0.5  /  DBili  x   /  AST  24  /  ALT  8   /  AlkPhos  101  10-30    PT/INR - ( 30 Oct 2024 22:10 )   PT: 12.8 sec;   INR: 1.08 ratio         PTT - ( 30 Oct 2024 22:10 )  PTT:41.9 sec  Urinalysis Basic - ( 30 Oct 2024 22:10 )    Color: x / Appearance: x / SG: x / pH: x  Gluc: 141 mg/dL / Ketone: x  / Bili: x / Urobili: x   Blood: x / Protein: x / Nitrite: x   Leuk Esterase: x / RBC: x / WBC x   Sq Epi: x / Non Sq Epi: x / Bacteria: x        RADIOLOGY & ADDITIONAL STUDIES:    A/P:  69 y.o. man residing at [facility] with PMH ESRD on HD M/W/F, multiple CVAs with residual L eye left gaze deviation, s/p R BKA and L AKA, L AVF, HTN, HLD, CAD, CVA, dementia (AOx2-3 at baseline per niece), brought in from HD after becoming unresponsive. Had rapid response in AM on 10/31. On CT found to have possible hydroptx, thoracic consulted.    - CT chest showing left lower moderate PTX in area of multiple Thoracentesis in past  - Pt admitted to medicine  - Care per medicine team  - Will review case and radiology with Dr. Israel  - Further recs to follow.     HPI (ED):  Mr. Sim is a 69 y.o. man residing at [facility] with PMH ESRD on HD M/W/F, multiple CVAs with residual L eye left gaze deviation, s/p R BKA and L AKA, L AVF, HTN, HLD, CAD, CVA, dementia (AOx2-3 at baseline per niece), brought in from HD after becoming unresponsive. Patient obtunded and unable to provide history. Per niece Ramonita, her and her brother most recently saw the patient on 10/19 and he was at his baseline w/o concerns. Yesterday, she received a call from [facility] that the pt had multiple episodes of vomiting and increased cough, and appeared more lethargic than usual prompting a "downgrade in his diet" from soft mechanical to honey-thickened. His last known normal is unknown.    Thoracic:  70 y/o male with significant pmhx admitted to ER from HD after being obtunded. Patient seen and examined at bedside by Thoracic team. Had Rapid Response this morning for hypotension. Had CT chest done, found to have a moderate Left sided PTX, concerning for hydroptx. Patient has had multiple thoracentesis on the left side for pleural effusions, each CT showing trapped lung without full re-expansion. Patient on 10L NRB, satting 100%, in NAD. He is responsive to his name and questions, but cannot provide history.       PAST MEDICAL & SURGICAL HISTORY:  DM (diabetes mellitus)      HTN (hypertension)      Below knee amputation status, right      MRSA (methicillin resistant Staphylococcus aureus) colonization  (hx/o MRSA growth from wound culture)      Wound  (chronic wounds to left foot and leg)      DM (diabetes mellitus)      HTN (hypertension)      Kidney disease      Stroke      ESRD on dialysis      S/P BKA (below knee amputation) unilateral, right      H/O peripheral artery bypass  left fem to below-knee pop with RSVG      Amputated left leg  above knee      Amputated right leg  below knee          REVIEW OF SYSTEMS  As reviewed in Medicine HPI    MEDICATIONS  (STANDING):    MEDICATIONS  (PRN):      Allergies    No Known Allergies    Intolerances        SOCIAL HISTORY:  Occupation:  Smoking Hx:   Etoh Hx:   IVDA Hx:     FAMILY HISTORY:  Family history of diabetes mellitus        Vital Signs Last 24 Hrs  T(C): 36.4 (31 Oct 2024 09:10), Max: 37 (31 Oct 2024 00:30)  T(F): 97.6 (31 Oct 2024 09:10), Max: 98.6 (31 Oct 2024 00:30)  HR: 74 (31 Oct 2024 10:26) (66 - 80)  BP: 149/84 (31 Oct 2024 10:26) (79/45 - 149/84)  BP(mean): 71 (31 Oct 2024 01:01) (63 - 71)  RR: 20 (31 Oct 2024 10:26) (17 - 25)  SpO2: 100% (31 Oct 2024 10:26) (95% - 100%)    Parameters below as of 31 Oct 2024 10:26  Patient On (Oxygen Delivery Method): mask, nonrebreather  O2 Flow (L/min): 10      PHYSICAL EXAM:  General: appears stated age, on NRB  Neck: trachea midline  Respiratory: Breathing comfortably on NRB satting 100% at bedside, no accessory muscle use  Gastrointestinal: Soft, NT, ND, +BS  Extremities: ARIAS x4  Vascular: Well perfused  Neurological: Nonfocal, no deficits  Psychiatric: Appropriate affect        LABS:                        10.4   7.53  )-----------( 210      ( 30 Oct 2024 22:10 )             34.9     10-30    139  |  98  |  21  ----------------------------<  141[H]  3.4[L]   |  27  |  3.46[H]    Ca    8.9      30 Oct 2024 22:10  Phos  TNP     10-31  Mg     2.10     10-30    TPro  8.5[H]  /  Alb  3.3  /  TBili  0.5  /  DBili  x   /  AST  24  /  ALT  8   /  AlkPhos  101  10-30    PT/INR - ( 30 Oct 2024 22:10 )   PT: 12.8 sec;   INR: 1.08 ratio         PTT - ( 30 Oct 2024 22:10 )  PTT:41.9 sec  Urinalysis Basic - ( 30 Oct 2024 22:10 )    Color: x / Appearance: x / SG: x / pH: x  Gluc: 141 mg/dL / Ketone: x  / Bili: x / Urobili: x   Blood: x / Protein: x / Nitrite: x   Leuk Esterase: x / RBC: x / WBC x   Sq Epi: x / Non Sq Epi: x / Bacteria: x        RADIOLOGY & ADDITIONAL STUDIES:    A/P:  69 y.o. man residing at [facility] with PMH ESRD on HD M/W/F, multiple CVAs with residual L eye left gaze deviation, s/p R BKA and L AKA, L AVF, HTN, HLD, CAD, CVA, dementia (AOx2-3 at baseline per niece), brought in from HD after becoming unresponsive. Had rapid response in AM on 10/31. On CT found to have possible hydroptx, thoracic consulted.    - CT chest showing left lower moderate PTX in area of multiple Thoracentesis in past  - Pt admitted to medicine  - Care per medicine team  - Satting 100% on NRB, no resp distress  - Pt w/ multiple Thoracentesis on Left side in past. Possible hydroptx from recurrent effusions  - If patient is requiring PTC, would recommend IR consult.  Above per Dr. Israel

## 2024-10-31 NOTE — CHART NOTE - NSCHARTNOTEFT_GEN_A_CORE
: Tamaraud    INDICATION: Shock, hypoxia    PROCEDURE:  [x ] LIMITED ECHO  [x ] LIMITED CHEST  [ ] LIMITED RETROPERITONEAL  [ ] LIMITED ABDOMINAL  [ ] LIMITED DVT  [ ] NEEDLE GUIDANCE VASCULAR  [ ] NEEDLE GUIDANCE THORACENTESIS  [ ] NEEDLE GUIDANCE PARACENTESIS  [ ] NEEDLE GUIDANCE PERICARDIOCENTESIS  [ ] OTHER    FINDINGS:  A- Line predominant lungs small Bline focus left anterior. Lung point over left lateral lung.   Lung pulse left apical though danielle poor sliding.    LVF is reduced likely moderate dysfunction with what appears to be anterior wall hypokenisis.   Virtual IVC.    INTERPRETATION:  LAterally visible lung point with normal aeration anterior lungs consistent with loculated pneumothorax.   LV dysfunction with? segmental hypokenisis should be evaluated by official TTE. But likely not his cause of hypotension.

## 2024-10-31 NOTE — CONSULT NOTE ADULT - SUBJECTIVE AND OBJECTIVE BOX
HPI:  69 year old man, [facility] resident,  with past medical history of CAD, CM HFrEF EF 35-40% (TTE 7/2024), PAD with R BKA and L AKA, functionally quadriplegic, CVA (residual: L eye gaze deviation), Hypertension, Hyperlipidemia, Dementia, ESRD on HD (MWF via L AVF) presented to ED after becoming unresponsive during HD.  Patient obtunded on arrival to ED.  Nephew at bedside and reports aides at [facility] reported patient more lethargic over past several days with multiple episodes of vomiting and coughing.  HD attempted today and 2 hours into HD patient become unresponsive and HD aborted.  On assessment, patient spontaneously woke up and was answering questions congruently, recognized nephew and call him by his name.  Patient states he is not having chest pain or SOB and denies pain anywhere else.  Patient A+OX1, currently SR 78, /52, lung sounds clear rr16, SATing 98% on RA, no sacral edema, temp 98.7F, JVD not observed due to beard. labs show no leukocytosis, anemia with H+H 10.4/34.9, creatinine 3.46, K+ 3.4, Troponin 622, . CKMB 2.6, Lactate 1.8, Chest X-ray with 3cm left basilar pneumothorax, ECG SR with REJI in V3.     Overnight, RRT called for hypotension and hypoxia. Patient already being given 500 cc IVF and placed on NBR per primary team. Upon arrival of rapid response team, patient uncomfortable appearing, groaning unintelligibly, notably diaphoretic and mildly tachypneic. Vital signs notable for BP 70s/50s, HR 90-100s (sinus on bedside cardiac monitor), SpO2 100% on NRB, temp 97 F, FS 140s. Patient also attempting to have a BM prior to this episode. Complaining of diffuse pain all over, including chest and abdominal pain. 500 cc given with gradual improvement in BP back to baseline SBP 100s. Repeat CXR obtained prior to rapid which on my read reveals possible enlargement of L basilar and ?lateral PTX.    At bedside, patient is A&Ox1. Currently on RA SPO2%. BP stable. He is c/o abdominal pain, stating that he needs to have a bowel movement. Patient's nephew confirms the patient has not recently had any recent procedures including thoracentesis in the area where the current PTX is located.     PAST MEDICAL & SURGICAL HISTORY:  DM (diabetes mellitus)      HTN (hypertension)      Below knee amputation status, right      MRSA (methicillin resistant Staphylococcus aureus) colonization  (hx/o MRSA growth from wound culture)      Wound  (chronic wounds to left foot and leg)      DM (diabetes mellitus)      HTN (hypertension)      Kidney disease      Stroke      ESRD on dialysis      S/P BKA (below knee amputation) unilateral, right      H/O peripheral artery bypass  left fem to below-knee pop with RSVG      Amputated left leg  above knee      Amputated right leg  below knee          FAMILY HISTORY:  Family history of diabetes mellitus        SOCIAL HISTORY:    Allergies    No Known Allergies    Intolerances        HOME MEDICATIONS:    REVIEW OF SYSTEMS:  Unable to assess ROS because patient's clinical condition    OBJECTIVE:  ICU Vital Signs Last 24 Hrs  T(C): 36.4 (31 Oct 2024 09:10), Max: 37 (31 Oct 2024 00:30)  T(F): 97.6 (31 Oct 2024 09:10), Max: 98.6 (31 Oct 2024 00:30)  HR: 79 (31 Oct 2024 11:30) (66 - 80)  BP: 141/85 (31 Oct 2024 11:30) (79/45 - 149/84)  BP(mean): 71 (31 Oct 2024 01:01) (63 - 71)  ABP: --  ABP(mean): --  RR: 20 (31 Oct 2024 11:30) (17 - 25)  SpO2: 99% (31 Oct 2024 11:30) (95% - 100%)    O2 Parameters below as of 31 Oct 2024 11:30  Patient On (Oxygen Delivery Method): room air              CAPILLARY BLOOD GLUCOSE      POCT Blood Glucose.: 140 mg/dL (31 Oct 2024 08:36)      PHYSICAL EXAM:  General: Awake, alert, oriented X 1.   HEENT: Atraumatic, normocephalic.    Respiratory: Normal chest expansion  Cardiovascular: S1 S2 normal. No murmurs, rubs or gallops.   Abdomen: Soft, non-tender, non-distended.   Extremities: Warm to touch. bilateral BKA  Skin: No rashes or skin lesions  Neurological: left eye gaze deviation.    HOSPITAL MEDICATIONS:  MEDICATIONS  (STANDING):  heparin   Injectable 5000 Unit(s) SubCutaneous every 12 hours    MEDICATIONS  (PRN):  acetaminophen     Tablet .. 650 milliGRAM(s) Oral every 6 hours PRN Temp greater or equal to 38C (100.4F), Mild Pain (1 - 3)      LABS:                        10.4   7.53  )-----------( 210      ( 30 Oct 2024 22:10 )             34.9     10-30    139  |  98  |  21  ----------------------------<  141[H]  3.4[L]   |  27  |  3.46[H]    Ca    8.9      30 Oct 2024 22:10  Phos  TNP     10-31  Mg     2.10     10-30    TPro  8.5[H]  /  Alb  3.3  /  TBili  0.5  /  DBili  x   /  AST  24  /  ALT  8   /  AlkPhos  101  10-30    PT/INR - ( 30 Oct 2024 22:10 )   PT: 12.8 sec;   INR: 1.08 ratio         PTT - ( 30 Oct 2024 22:10 )  PTT:41.9 sec  Urinalysis Basic - ( 30 Oct 2024 22:10 )    Color: x / Appearance: x / SG: x / pH: x  Gluc: 141 mg/dL / Ketone: x  / Bili: x / Urobili: x   Blood: x / Protein: x / Nitrite: x   Leuk Esterase: x / RBC: x / WBC x   Sq Epi: x / Non Sq Epi: x / Bacteria: x        Venous Blood Gas:  10-31 @ 10:15  7.40/43/63/27/92.7  VBG Lactate: 3.0  Venous Blood Gas:  10-31 @ 09:07  7.31/20/70/10/96.3  VBG Lactate: 1.1  Venous Blood Gas:  10-30 @ 22:10  7.37/56/39/32/62.8  VBG Lactate: 1.8      MICROBIOLOGY:     RADIOLOGY:  ACC: [id number] EXAM:  CT ABDOMEN AND PELVIS IC   ORDERED BY: AILYN CASTELLANOS     ACC: [id number] EXAM:  CT CHEST IC   ORDERED BY: AILYN CASTELLANOS     PROCEDURE DATE:  10/31/2024          INTERPRETATION:  CLINICAL INFORMATION: Vomiting. Sepsis. Cough. No   aspiration.    COMPARISON: None.    CONTRAST/COMPLICATIONS:  IV Contrast: Omnipaque 350  90 cc administered   10 cc discarded  Oral Contrast: NONE  Complications: None reported at time of study completion    PROCEDURE:  CT of the Chest, Abdomen and Pelvis was performed.  Sagittal and coronal reformats were performed.    FINDINGS:  CHEST:  LUNGS AND LARGE AIRWAYS: Patent central airways. Indeterminate   consolidation in the left lower lobe possibly infection and/or   aspiration. Correlate clinically. Recommend short-term follow-up   noncontrast chest CT to assess for resolution and exclude other   etiologies.  PLEURA: Small right effusion and trace left effusion. Moderate left   pneumothorax. No mediastinal shift.  VESSELS: Aortic calcifications. Coronary artery calcifications.  HEART: Mild cardiomegaly. No pericardial effusion.  MEDIASTINUM AND ROBERT: No lymphadenopathy.  CHEST WALL AND LOWER NECK: Within normal limits.    ABDOMEN AND PELVIS:  LIVER: Within normal limits.  BILE DUCTS: Normalcaliber.  GALLBLADDER: Within normal limits.  SPLEEN: Within normal limits.  PANCREAS: Within normal limits.  ADRENALS: Within normal limits.  KIDNEYS/URETERS: Within normal limits.    BLADDER: Within normal limits.  REPRODUCTIVE ORGANS: Prostate is enlarged.    BOWEL: Rectum is distended with stool to 7 cm. There is a moderate   colonic stool burden. No small bowel obstruction. Appendix is normal.  PERITONEUM/RETROPERITONEUM: Within normal limits.  VESSELS: Atherosclerotic changes.  LYMPH NODES:No lymphadenopathy.  ABDOMINAL WALL: Within normal limits.  BONES: Within normal limits.    IMPRESSION:    Small right effusion and trace left effusion.    Moderate left pneumothorax. No mediastinal shift.    Indeterminate consolidation in the left lower lobe possibly infection   and/or aspiration. Correlate clinically. Recommend short-term follow-up   noncontrast chest CT to assess for resolution and exclude other   etiologies.    Rectum is distended with stool to 7 cm. There is a moderate colonic stool   burden. No small bowel obstruction.    ACC: [id number] EXAM:  XR CHEST PORTABLE URGENT 1V   ORDERED BY: JANIA RILEY     PROCEDURE DATE:  10/31/2024          INTERPRETATION:  CLINICAL INFORMATION: Pneumothorax    TIME OF EXAMINATION: October 31 at 8:27 AM    EXAM: Portable chest    FINDINGS:    Left pneumothorax unchanged from the study performed earlier in the day.  Loop recorder and normal heart size.  No focal pulmonary abnormalities.        COMPARISON: October 31 at 6:00 AM        IMPRESSION: Follow-up left pneumothorax unchanged.    --- End of Report ---        Point of Care Ultrasound Findings;    PFT:    EKG:

## 2024-10-31 NOTE — H&P ADULT - PROBLEM SELECTOR PLAN 2
Likely in the setting of PTX and possible aspiration Pneumonia  - CT Chest: Small right effusion and trace left effusion. Moderate left pneumothorax. No mediastinal shift. Indeterminate consolidation in the left lower lobe possibly infection and/or aspiration  - RRT 10/31 for hypotension/tachypnea, placed on NRM, s/p 500cc bolus w/ improvement, apprec assistance  - On NRM, trial of NC 6L min, wean off O2 as tolerated,   - Pulmonary to evaluate patient, f/up recs  - Will c/w zosyn for now for possible aspiration pneumonia, total of 5 days thru 11/5  - F/up S&S evaluation  - Aspiration precautions  - Low threshold to consult MICU, Full Code per GOC note above  - Consider Palliative Consultation pending clinical progress Likely in the setting of PTX and possible aspiration Pneumonia  - CT Chest: Small right effusion and trace left effusion. Moderate left pneumothorax. No mediastinal shift. Indeterminate consolidation in the left lower lobe possibly infection and/or aspiration  - RRT 10/31 for hypotension/tachypnea, placed on NRM, s/p 500cc bolus w/ improvement, apprec assistance  - On NRM, trial of NC 6L min, wean off O2 as tolerated,   - Pulmonary to evaluate patient, f/up recs  - Will c/w zosyn for now for possible aspiration pneumonia, total of 5 days thru 11/5  - F/up S&S evaluation (was on mechanical soft/thin liquids at NH)  - Aspiration precautions  - Low threshold to consult MICU, Full Code per GOC note above  - Consider Palliative Consultation pending clinical progress Likely in the setting of PTX and possible aspiration Pneumonia  - CT Chest: Small right effusion and trace left effusion. Moderate left pneumothorax. No mediastinal shift. Indeterminate consolidation in the left lower lobe possibly infection and/or aspiration  - RRT 10/31 for hypotension/tachypnea, placed on NRM, s/p 500cc bolus w/ improvement, apprec assistance  - On NRM, trial of NC 6L min, wean off O2 as tolerated,   - Pulmonary to evaluate patient, f/up recs  - Will c/w zosyn for now for possible aspiration pneumonia, total of 5 days thru 11/5  - Pending dysphagia screen prior to ordering a diet  (was on mechanical soft/thin liquids at NH)  - F/up formal S&S evaluation  - Aspiration precautions  - Low threshold to consult MICU, Full Code per GOC note above  - Consider Palliative Consultation pending clinical progress

## 2024-10-31 NOTE — CONSULT NOTE ADULT - ASSESSMENT
69 year old man, [facility] resident,  with hx/p CAD, CM HFrEF EF 35-40% (TTE 7/2024), PAD with R BKA and L AKA, functionally quadriplegic, CVA (residual: L eye gaze deviation), HTN, HLD, Dementia, ESRD on HD (MWF via L AVF) presented to ED after becoming unresponsive during HD. In the ED he was initially HDS on RA. Noted to have elevated troponin and ECG SR with REJI in V3. Initial CXR demonstrated 3cm left basilar pneumothorax. CT chest mderate left PTX. No mediastinal shift. Overnight, RRT called for hypotension and hypoxia. BP 70s/50s improved with  cc bolus and SPO2 100% on NRB. Repeat CXR demonstrating unchanged basilar PTX. Pulmonology was consulted for further evaluation of this basilar PTX. Bedside POCUS demonstrated basilar and lateral PTX. Of note, PTX is located where previously pleural effusion was in previous CT scan    Recommendations:  - no acute intervention warranted at this time for PTX  - serial chest XR to monitor PTX  - monitor on continuos pulse ox and tele  - f/u additional cardiac recs regarding elevated Troponin with associated EKG changes and repeat TTE 69 year old man, [facility] resident,  with hx/p CAD, CM HFrEF EF 35-40% (TTE 7/2024), PAD with R BKA and L AKA, functionally quadriplegic, CVA (residual: L eye gaze deviation), HTN, HLD, Dementia, ESRD on HD (MWF via L AVF) presented to ED after becoming unresponsive during HD. In the ED he was initially HDS on RA. Noted to have elevated troponin and ECG SR with REJI in V3. Initial CXR demonstrated 3cm left basilar pneumothorax. CT chest mderate left PTX. No mediastinal shift. Overnight, RRT called for hypotension and hypoxia. BP 70s/50s improved with  cc bolus and SPO2 100% on NRB. Repeat CXR demonstrating unchanged basilar PTX. Pulmonology was consulted for further evaluation of this basilar PTX. Bedside POCUS demonstrated basilar and lateral PTX. Of note, PTX is located where previously pleural effusion was in previous CT scan    Recommendations:   69 year old man, [facility] resident,  with hx/p CAD, CM HFrEF EF 35-40% (TTE 7/2024), PAD with R BKA and L AKA, functionally quadriplegic, CVA (residual: L eye gaze deviation), HTN, HLD, Dementia, ESRD on HD (MWF via L AVF) presented to ED after becoming unresponsive during HD. In the ED he was initially HDS on RA. Noted to have elevated troponin and ECG SR with REJI in V3. Initial CXR demonstrated 3cm left basilar pneumothorax. CT chest mderate left PTX. No mediastinal shift. Overnight, RRT called for hypotension and hypoxia. BP 70s/50s improved with  cc bolus and SPO2 100% on NRB. Repeat CXR demonstrating unchanged basilar PTX. Pulmonology was consulted for further evaluation of this basilar PTX. Bedside POCUS demonstrated basilar and lateral PTX.     Recommendations:   69 year old man, [facility] resident,  with hx/p CAD, CM HFrEF EF 35-40% (TTE 7/2024), PAD with R BKA and L AKA, functionally quadriplegic, CVA (residual: L eye gaze deviation), HTN, HLD, Dementia, ESRD on HD (MWF via L AVF) presented to ED after becoming unresponsive during HD. In the ED he was initially HDS on RA. Noted to have elevated troponin and ECG SR with REJI in V3. Initial CXR demonstrated 3cm left basilar pneumothorax. CT chest mderate left PTX. No mediastinal shift. Overnight, RRT called for hypotension and hypoxia. BP 70s/50s improved with  cc bolus and SPO2 100% on NRB. Repeat CXR demonstrating unchanged basilar PTX. Pt with hx/o multiple L sided thoracentesis. Pulmonology was consulted for further evaluation of this basilar PTX. Bedside POCUS demonstrated basilar and lateral PTX. Lateral lung point. Lung pulse noted anteriorly.     #dyspnea  #hypoxemia  #pneumothorax    Recommendations:  - patient with yellow-color sputum and oral secretions, reasonable to obtain sputum culture and start abx. Pt with known hx/o MRSA  - no acute intervention warranted for PTX at this time  - serial chest x-rays to assess PTX  - avoid bilevel  - f/u additional cardiology recs regarding elevated troponin and associated EKG changes and repeat TTE   69 year old man, [facility] resident,  with hx/p CAD, CM HFrEF EF 35-40% (TTE 7/2024), PAD with R BKA and L AKA, functionally quadriplegic, CVA (residual: L eye gaze deviation), HTN, HLD, Dementia, ESRD on HD (MWF via L AVF) presented to ED after becoming unresponsive during HD. In the ED he was initially HDS on RA. Noted to have elevated troponin and ECG SR with REJI in V3. Initial CXR demonstrated 3cm left basilar pneumothorax. CT chest mderate left PTX. No mediastinal shift. Overnight, RRT called for hypotension and hypoxia. BP 70s/50s improved with  cc bolus and SPO2 100% on NRB. Repeat CXR demonstrating unchanged basilar PTX. Pt with hx/o multiple L sided thoracentesis. Pulmonology was consulted for further evaluation of this basilar PTX. Bedside POCUS demonstrated basilar and lateral PTX. Lateral lung point. Lung pulse noted anteriorly.     #dyspnea  #hypoxemia  #pneumothorax    Recommendations:  - currently on RA, continue to monitor  - patient with yellow-color sputum and oral secretions, reasonable to obtain sputum culture and start abx. Pt with known hx/o MRSA  - no acute intervention warranted for PTX at this time  - serial chest x-rays to assess PTX  - avoid bilevel ventilation  - f/u additional cardiology recs regarding elevated troponin and associated EKG changes and repeat TTE   69 year old man, [facility] resident,  with hx/p CAD, CM HFrEF EF 35-40% (TTE 7/2024), PAD with R BKA and L AKA, functionally quadriplegic, CVA (residual: L eye gaze deviation), HTN, HLD, Dementia, ESRD on HD (MWF via L AVF) presented to ED after becoming unresponsive during HD. In the ED he was initially HDS on RA. Noted to have elevated troponin and ECG SR with REJI in V3. Initial CXR demonstrated 3cm left basilar pneumothorax. CT chest mderate left PTX. No mediastinal shift. Overnight, RRT called for hypotension and hypoxia. BP 70s/50s improved with  cc bolus and SPO2 100% on NRB. Repeat CXR demonstrating unchanged basilar PTX. Pt with hx/o multiple L sided thoracentesis. Pulmonology was consulted for further evaluation of this basilar PTX. Bedside POCUS demonstrated basilar and lateral PTX. Lateral lung point. Lung pulse noted anteriorly.     #dyspnea  #hypoxemia  #pneumothorax    Recommendations:  - currently on RA, continue to monitor  - patient with dark yellow-color sputum and oral secretions, reasonable to obtain sputum culture and start abx. Pt with known hx/o MRSA  - no acute intervention warranted for PTX at this time  - serial chest x-rays to assess PTX  - avoid bilevel ventilation  - f/u additional cardiology recs regarding elevated troponin and associated EKG changes and repeat TTE

## 2024-10-31 NOTE — H&P ADULT - PROBLEM SELECTOR PLAN 4
No chest pain  - Troponin peaked at 622  - EKG: NSR, HR 75, LVH, T elevation in V3, TWI shubham-lateral leads (several EKG's done since adm are similar)  - Seen by Cards, apprec recs  - Likely stress induced from metabolic encephalopathic event, at worst type II MI. Likely not even that, just elevation in setting of renal dysfunction  - F/up TTE  - F/up A1c and lipid profile in AM  - Telemetry monitoring for now, can likely be d/c soon per Cards recs

## 2024-10-31 NOTE — H&P ADULT - NSHPREVIEWOFSYSTEMS_GEN_ALL_CORE
REVIEW OF SYSTEMS:    CONSTITUTIONAL: As HPI  EYES/ENT: No visual changes;  No vertigo or throat pain   NECK: No pain or stiffness  RESPIRATORY: As HPI  CARDIOVASCULAR: As HPI  GASTROINTESTINAL: As HPI.  GENITOURINARY: No dysuria, frequency or hematuria  NEUROLOGICAL: As HPI  SKIN: No itching, rashes

## 2024-10-31 NOTE — H&P ADULT - ASSESSMENT
69M from [facility] with PMHx of ESRD on HD M/W/F, Anemia, HTN, PVD s/p R BKA and L AKA, multiple CVA's w/ residual L eye left gaze deviation, Vascular Dementia (AOx2 at baseline), CAD, GERD and Hyperlipidemia who is sent from NH for AMS during HD session on 10/30. Found to have moderate size L sided PTx and elevated cardiac enzymes. RRT 10/31 for hypotension that responded to IVF"s. Admitted for further care.

## 2024-10-31 NOTE — H&P ADULT - HISTORY OF PRESENT ILLNESS
69M from [facility] with PMHx of ESRD on HD M/W/F, Anemia, HTN, PVD s/p R BKA and L AKA, multiple CVA's w/ residual L eye left gaze deviation, Vascular Dementia (AOx2 at baseline), CAD, GERD and Hyperlipidemia who is sent from NH for AMS during HD session on 10/30, session was not completed. Per ER notes, HCP Ramonita received a call from [facility] on 10/30 that the pt had multiple episodes of vomiting and increased cough, and appeared more lethargic than usual prompting a "downgrade in his diet" from soft mechanical to honey-thickened. Per chart review ([place]/NH documents) and history obtained from pt's niece-HCP Ramonita, pt's MS had improved last night as she came to visit him and he was able to recognize her. During interview today pt is Oriented x 1 (self) and states that his belly is bothering him, denies chest pain or SOB. Pt had a RRT this AM for hypotension/tachypnea and I evaluated him at that time and discussed with the RR Team.    In ER /63, HR 73, Temp 97.5F, Saturating 95% on RA  Received IV zosyn, dulcolax suppository, NS 500cc

## 2024-10-31 NOTE — CONSULT NOTE ADULT - NS ATTEND AMEND GEN_ALL_CORE FT
as above
Patient seen and examined agree with above note as modified, where appropriate, by me. pt with multiple medical comorbidities, CVA, dementia, ESRD on HD found to have small left hydroptx. Given hx and multiple taps this likely represents a trapped lung. No indication for pigtail placement. Would be high risk for surgical intervention and given pt asymptomatic no role for surgical intervention at this time. reconsult PRN.
Pt well known to me from previous admission.  Elevated, non dynamic HST in setting of CKD.  At worst, this is a type II MI. Likely not even that, just elevation in setting of renal dysfunction.  Doubt echo has an incremental value.  No need for tele or further cardiac testing

## 2024-10-31 NOTE — CONSULT NOTE ADULT - SUBJECTIVE AND OBJECTIVE BOX
[facility]   MD MARIBELL CARRION MD ANGELA WONG, PA        TEL:  OFFICE: [phone]  From 5pm-7am answering service [phone]    --- INITIAL RENAL CONSULT NOTE ---date of service 10-31-24 @ 13:59    HPI:  69M from [facility] with PMHx of ESRD on HD M/W/F, Anemia, HTN, PVD s/p R BKA and L AKA, multiple CVA's w/ residual L eye left gaze deviation, Vascular Dementia (AOx2 at baseline), CAD, GERD and Hyperlipidemia who is sent from NH for AMS during HD session on 10/30, session was not completed. Per ER notes, HCP Ramonita received a call from [facility] on 10/30 that the pt had multiple episodes of vomiting and increased cough, and appeared more lethargic than usual prompting a "downgrade in his diet" from soft mechanical to honey-thickened. Per chart review ([place]/NH documents) and history obtained from pt's niece-HCP Ramonita.   pt seen and examined at bedside. c/o severe abd pain and wants to use bathroom other wise no complaints. pt does not recall what happened or why he is in the hospital    In ER /63, HR 73, Temp 97.5F, Saturating 95% on RA  Received IV zosyn, dulcolax suppository, NS 500cc (31 Oct 2024 10:41)        Allergies:  No Known Allergies      PAST MEDICAL & SURGICAL HISTORY:  DM (diabetes mellitus)      HTN (hypertension)      Below knee amputation status, right      MRSA (methicillin resistant Staphylococcus aureus) colonization  (hx/o MRSA growth from wound culture)      Wound  (chronic wounds to left foot and leg)      DM (diabetes mellitus)      HTN (hypertension)      Kidney disease      Stroke      ESRD on dialysis      S/P BKA (below knee amputation) unilateral, right      H/O peripheral artery bypass  left fem to below-knee pop with RSVG      Amputated left leg  above knee      Amputated right leg  below knee          Home Medications Reviewed    Hospital Medications:   MEDICATIONS  (STANDING):  aspirin  chewable 81 milliGRAM(s) Oral daily  atorvastatin 40 milliGRAM(s) Oral at bedtime  heparin   Injectable 5000 Unit(s) SubCutaneous every 12 hours  lactobacillus acidophilus 1 Tablet(s) Oral daily  midodrine. 7.5 milliGRAM(s) Oral <User Schedule>  multivitamin 1 Tablet(s) Oral daily  pantoprazole    Tablet 40 milliGRAM(s) Oral before breakfast  piperacillin/tazobactam IVPB.. 3.375 Gram(s) IV Intermittent every 12 hours  polyethylene glycol 3350 17 Gram(s) Oral daily  prednisoLONE acetate 1% Suspension 1 Drop(s) Both EYES two times a day  senna 2 Tablet(s) Oral at bedtime      SOCIAL HISTORY:  Denies ETOh, Smoking,     FAMILY HISTORY:  Family history of diabetes mellitus        REVIEW OF SYSTEMS:  CONSTITUTIONAL: No weakness, fevers or chills  EYES/ENT: No visual changes;  No vertigo or throat pain   NECK: No pain or stiffness  RESPIRATORY: No cough, wheezing, hemoptysis; No shortness of breath  CARDIOVASCULAR: No chest pain or palpitations.  GASTROINTESTINAL: see hpi  GENITOURINARY: No dysuria, frequency, foamy urine, urinary urgency, incontinence or hematuria  NEUROLOGICAL: No numbness or weakness  SKIN: No itching, burning, rashes, or lesions   VASCULAR: No bilateral lower extremity edema.   All other review of systems is negative unless indicated above.    VITALS:  T(F): 97.6 (10-31-24 @ 09:10), Max: 98.6 (10-31-24 @ 00:30)  HR: 87 (10-31-24 @ 12:22)  BP: 149/80 (10-31-24 @ 12:22)  RR: 19 (10-31-24 @ 12:22)  SpO2: 98% (10-31-24 @ 12:22)  Wt(kg): --        PHYSICAL EXAM:  General: NAD  HEENT: anicteric sclera, oropharynx clear, MMM  Neck: No JVD  Respiratory: CTAB, no wheezes, rales or rhonchi  Cardiovascular: S1, S2, RRR  Gastrointestinal: BS+, soft, epigastric tenderness  Extremities: b/l amputation   Neurological: A/O x 3, no focal deficits  Psychiatric: Normal mood, normal affect  : No CVA tenderness. No cervantes.   Skin: No rashes  Vascular Access: left avg, no bruit or thrill appreciated     LABS:  10-31    135  |  95[L]  |  28[H]  ----------------------------<  139[H]  3.5   |  21[L]  |  3.94[H]    Ca    8.6      31 Oct 2024 11:58  Phos  4.0     10-31  Mg     1.90     10-31    TPro  7.9  /  Alb  3.0[L]  /  TBili  0.5  /  DBili      /  AST  21  /  ALT  9   /  AlkPhos  90  10-31    Creatinine Trend: 3.94 <--, 3.46 <--                        10.3   6.34  )-----------( 206      ( 31 Oct 2024 11:58 )             32.4     Urine Studies:  Urinalysis Basic - ( 31 Oct 2024 11:58 )    Color:  / Appearance:  / SG:  / pH:   Gluc: 139 mg/dL / Ketone:   / Bili:  / Urobili:    Blood:  / Protein:  / Nitrite:    Leuk Esterase:  / RBC:  / WBC    Sq Epi:  / Non Sq Epi:  / Bacteria:           RADIOLOGY & ADDITIONAL STUDIES:

## 2024-10-31 NOTE — H&P ADULT - PROBLEM SELECTOR PLAN 6
On carvedilol 6.25mg BID and gets midodrine 7.5mg prior to HD sessions  - Hypotensive 10/31 w/ subsequent RRT, BP improved after 500cc NS  - Monitor BP closely and resume coreg as tolerated (if needed), most recent /85

## 2024-10-31 NOTE — CONSULT NOTE ADULT - ASSESSMENT
69M [facility] CAD, CM HFrEF EF 35-40% (TTE 7/2024), PAD with R BKA and L AKA, functionally quadriplegic, CVA (residual: L eye gaze deviation), Hypertension, Hyperlipidemia, Dementia, ESRD on HD (MWF via L AVF) presented to ED after becoming unresponsive during HD  found to have ST elevations in V3 with elevated Troponins.  Multiple ECGs reviewed with Dr. HARSHAD Enriquez Interventional Cardiology and ECGs do not meet STEMI criteria, troponins elevated to 500 in past likely due to ESRD and stressed induced from metabolic encephalopathic event, CKMB negative x 1, patient reports no chest pain.

## 2024-10-31 NOTE — H&P ADULT - PROBLEM SELECTOR PLAN 1
AMS during HD session on 10/30 which was not completed  - Possibly multifactorial: left PTX, possible aspiration pneumonia  - MS seems to be improving, currently oriented to self (baseline Oriented x2 per HCP self/place)  - Continue to monitor MS closely

## 2024-10-31 NOTE — CONSULT NOTE ADULT - ATTENDING COMMENTS
AVG is thrombosed  will d/w HCP goals of care and potential thrombectomy   otherwise pt will need a long term catheter
69 year old man, [facility] resident,  with hx/p CAD, CM HFrEF EF 35-40% (TTE 7/2024), PAD with R BKA and L AKA, functionally quadriplegic, CVA (residual: L eye gaze deviation), HTN, HLD, Dementia, ESRD on HD (MWF via L AVF) presented to ED after becoming unresponsive during HD.   Consult after RRT for Hypoxia and hypotnesion in the setting of loculated basilar pneumothrax.   A/P:   Likely trapped lung with loculated Pneumothorax, air is in the same distribution as prior known effusion but patient with no recent thora.   Possibly Developed during recent coughing fit from nausea vomiting. ? new Bronchopleural fistula to the loculated Space.  Asymptomatic,   100% on R/A during exam, cough productive of brown sputum  - in this setting would advise conservative management with Observation, if not expanding/decreasing likely no t worth intervening, suspect will refill with fluid given time  - please avoid Bipap.   - sputum cx  - consider abx given cough, thick and dark sputum.   - Daily CX and with any progression/developed hypoxia  - Pocus with atleast moderate LV dysfunction to my eyes somewhat segmental  but not in shock at the time of exam, fluid tolerant though BPS were no 150s. would ask cardiology if given ST segment changes his echo is concerning ( overall eff seems only slightly reduced from baseline but looks like anterior wall is hypokenetic.)  Suspect hypotension was in the setting of vagal event post Large BM in a hypovolemic patinet and has resolved with 500cc bolus.

## 2024-10-31 NOTE — CONSULT NOTE ADULT - PROBLEM SELECTOR RECOMMENDATION 9
Admit to telemetry  Likely stress induced from metabolic encephalopathic event  No need for urgent anti-platelet therapy at this time  Trend Troponin CK CKMB q8h x 3  Obtain HgA1c, TSH, Lipid Profile, T+S  Daily CBC, BMP Mag Phos  TTE in am to evaluate LV function  Case discussed with Dr. HARSHAD Enriquez  Cardiology to follow Admit to telemetry  Likely stress induced from metabolic encephalopathic event  No need for urgent anti-platelet therapy at this time  Trend Troponin CK CKMB q8h x 3  Obtain HgA1c, TSH, Lipid Profile, T+S  Daily CBC, BMP Mag Phos  TTE in am to evaluate LV function  Consider Thoracic Consult for L Basilar Pneumothorax  Case discussed with Dr. HARSHAD Enriquez  Cardiology to follow

## 2024-10-31 NOTE — H&P ADULT - NSHPLABSRESULTS_GEN_ALL_CORE
.  LABS:                         10.4   7.53  )-----------( 210      ( 30 Oct 2024 22:10 )             34.9     10-30    139  |  98  |  21  ----------------------------<  141[H]  3.4[L]   |  27  |  3.46[H]    Ca    8.9      30 Oct 2024 22:10  Phos  TNP     10-31  Mg     2.10     10-30    TPro  8.5[H]  /  Alb  3.3  /  TBili  0.5  /  DBili  x   /  AST  24  /  ALT  8   /  AlkPhos  101  10-30    PT/INR - ( 30 Oct 2024 22:10 )   PT: 12.8 sec;   INR: 1.08 ratio         PTT - ( 30 Oct 2024 22:10 )  PTT:41.9 sec  Urinalysis Basic - ( 30 Oct 2024 22:10 )    Color: x / Appearance: x / SG: x / pH: x  Gluc: 141 mg/dL / Ketone: x  / Bili: x / Urobili: x   Blood: x / Protein: x / Nitrite: x   Leuk Esterase: x / RBC: x / WBC x   Sq Epi: x / Non Sq Epi: x / Bacteria: x        RADIOLOGY, EKG & ADDITIONAL TESTS: Reviewed.     CT Head:   No acute intracranial hemorrhage, mass effect, or midline shift.  Microvascular ischemic and involutional changes.  Again seen is a 1.2 cm hyperdense mass in the left cerebellopontine angle   previously characterized as a schwannoma, grossly stable.    CT Chest/Abd/Pelvis:  Small right effusion and trace left effusion.  Moderate left pneumothorax. No mediastinal shift.  Indeterminate consolidation in the left lower lobe possibly infection and/or aspiration  Rectum is distended with stool to 7 cm. There is a moderate colonic stool   burden. No small bowel obstruction.

## 2024-10-31 NOTE — CONSULT NOTE ADULT - ASSESSMENT
69M w hx vascular dementia (A&Ox2 at baseline), CVAs w residual deficits (L eye deviation), CAD, HTN, HLD, anemia, PAD s/p L fem-BK pop bypass and jump graft from distal anastomosis to left PT with vein 3/29/18, later popliteal ligation 5/3/18, and ultimately R BKA and L AKA at an OSH, ESRD M/W/F via LUE AVG also created at OSH with stents noted on CXR, admitted for AMS during HD 10/30. Vascular surgery consulted for poor thrill/bruit after RRT for hypotension, concern for thrombosed AVG. Patient also with notable elevated troponin and ST changes, L PTX, and possible newly worsened LV dysfunction.     Recs:  - May require LUE fistulogram, possible thrombectomy; please preop (NPO at MN, 4AM labs including T&S) for tentative fistulogram tomorrow       - Will discuss with family pending final evaluation by attending and medical status       - Please document medical and cardiac risk stratification for fistulogram  - HD per nephro, no urgent need for HD today   - f/u duplex  - f/u medical and cardiac workup  - Remainder of care per primary  - Will follow    Discussed with vascular fellow on behalf of Dr. Winter    Vascular Surgery  [id number]

## 2024-10-31 NOTE — CONSULT NOTE ADULT - SUBJECTIVE AND OBJECTIVE BOX
VASCULAR SURGERY CONSULT NOTE    HPI:  69M with history of vascular dementia (A&Ox2 at baseline), CVAs w residual deficits (L eye deviation), CAD, HTN, HLD, anemia, PAD s/p L fem-BK pop bypass and jump graft from distal anastomosis to left PT with vein 3/29/18, later popliteal ligation 5/3/18, and ultimately R BKA and L AKA at an OSH, ESRD M/W/F via LUE AVG also created at OSH, admitted for AMS during HD 10/30. HD session was not completed. Per chart, he also had multiple episodes of vomiting and cough as well as increased lethargy the same day. A RRT was called for hypotension and hypoxia the morning of 10/31. Vascular surgery consulted for poor thrill and bruit after RRT, concern for thrombosed AVG.      VITALS & I/Os:  Vital Signs Last 24 Hrs  T(C): 36.4 (31 Oct 2024 09:10), Max: 37 (31 Oct 2024 00:30)  T(F): 97.6 (31 Oct 2024 09:10), Max: 98.6 (31 Oct 2024 00:30)  HR: 87 (31 Oct 2024 12:22) (66 - 87)  BP: 149/80 (31 Oct 2024 12:22) (79/45 - 149/84)  BP(mean): 71 (31 Oct 2024 01:01) (63 - 71)  RR: 19 (31 Oct 2024 12:22) (17 - 25)  SpO2: 98% (31 Oct 2024 12:22) (95% - 100%)    Parameters below as of 31 Oct 2024 12:22  Patient On (Oxygen Delivery Method): room air        I&O's Summary      Physical Exam:  General: NAD, male appearing stated age  Neuro: Awake, A&Ox1-2 (self, knows in hospital but not where)  Resp: Unlabored breathing, symmetric chest expansion  Extremities: BUE WWP, LUE AVG with barely palpable thrill    Laboratory:                        10.3   6.34  )-----------( 206      ( 10-31 @ 11:58 )             32.4                         10.4   7.53  )-----------( 210      ( 10-30 @ 22:10 )             34.9     10-31                             Phos: 4.0 M.90  135  |  95  |  28  ----------------------------<  139  3.5   |  21  |  3.94        , 10-31                             Phos: TNP Mg: xx  xx  |  xx  |  xx  ----------------------------<  xx  xx   |  xx  |  xx          10-31   TPro 7.9 / Alb 3.0[L] / TBili 0.5 / DBili x  / AST/AST /  / AlkPhos 90  10-30   TPro 8.5[H] / Alb 3.3 / TBili 0.5 / DBili x  / AST/AST /  / AlkPhos 101    PT/INR/PTT - (10-30 @ 22:10) PT: 12.8 sec; INR: 1.08 ratio ; PTT: 41.9 sec          Urinalysis Basic - ( 31 Oct 2024 11:58 )    Color: x / Appearance: x / SG: x / pH: x  Gluc: 139 mg/dL / Ketone: x  / Bili: x / Urobili: x   Blood: x / Protein: x / Nitrite: x   Leuk Esterase: x / RBC: x / WBC x   Sq Epi: x / Non Sq Epi: x / Bacteria: x

## 2024-10-31 NOTE — PATIENT PROFILE ADULT - FALL HARM RISK - HARM RISK INTERVENTIONS
Assistance with ambulation/Assistance OOB with selected safe patient handling equipment/Communicate Risk of Fall with Harm to all staff/Discuss with provider need for PT consult/Monitor for mental status changes/Monitor gait and stability/Provide patient with walking aids - walker, cane, crutches/Reinforce activity limits and safety measures with patient and family/Tailored Fall Risk Interventions/Use of alarms - bed, chair and/or voice tab/Visual Cue: Yellow wristband and red socks/Bed in lowest position, wheels locked, appropriate side rails in place/Call bell, personal items and telephone in reach/Instruct patient to call for assistance before getting out of bed or chair/Non-slip footwear when patient is out of bed/[place] to call system/Physically safe environment - no spills, clutter or unnecessary equipment/Purposeful Proactive Rounding/Room/bathroom lighting operational, light cord in reach

## 2024-10-31 NOTE — CONSULT NOTE ADULT - SUBJECTIVE AND OBJECTIVE BOX
HPI (ED):  Mr. Sim is a 69 y.o. man residing at [facility] with PMH ESRD on HD M/W/F, multiple CVAs with residual L eye left gaze deviation, s/p R BKA and L AKA, L AVF, HTN, HLD, CAD, CVA, dementia (AOx2-3 at baseline per niece), brought in from HD after becoming unresponsive. Patient obtunded and unable to provide history. Per niece Ramonita, her and her brother most recently saw the patient on 10/19 and he was at his baseline w/o concerns. Yesterday, she received a call from [facility] that the pt had multiple episodes of vomiting and increased cough, and appeared more lethargic than usual prompting a "downgrade in his diet" from soft mechanical to honey-thickened. His last known normal is unknown.    Thoracic:  70 y/o male with significant pmhx admitted to ER from HD after being obtunded. Patient seen and examined at bedside by Thoracic team. Had Rapid Response this morning for hypotension. Had CT chest done, found to have a moderate Left sided PTX, concerning for hydroptx. Patient has had multiple thoracentesis on the left side for pleural effusions, each CT showing trapped lung without full re-expansion. Patient on 10L NRB, satting 100%, in NAD. He is responsive to his name and questions, but cannot provide history.       PAST MEDICAL & SURGICAL HISTORY:  DM (diabetes mellitus)      HTN (hypertension)      Below knee amputation status, right      MRSA (methicillin resistant Staphylococcus aureus) colonization  (hx/o MRSA growth from wound culture)      Wound  (chronic wounds to left foot and leg)      DM (diabetes mellitus)      HTN (hypertension)      Kidney disease      Stroke      ESRD on dialysis      S/P BKA (below knee amputation) unilateral, right      H/O peripheral artery bypass  left fem to below-knee pop with RSVG      Amputated left leg  above knee      Amputated right leg  below knee          REVIEW OF SYSTEMS  As reviewed in Medicine HPI    MEDICATIONS  (STANDING):    MEDICATIONS  (PRN):      Allergies    No Known Allergies    Intolerances        SOCIAL HISTORY:  Occupation:  Smoking Hx:   Etoh Hx:   IVDA Hx:     FAMILY HISTORY:  Family history of diabetes mellitus        Vital Signs Last 24 Hrs  T(C): 36.4 (31 Oct 2024 09:10), Max: 37 (31 Oct 2024 00:30)  T(F): 97.6 (31 Oct 2024 09:10), Max: 98.6 (31 Oct 2024 00:30)  HR: 74 (31 Oct 2024 10:26) (66 - 80)  BP: 149/84 (31 Oct 2024 10:26) (79/45 - 149/84)  BP(mean): 71 (31 Oct 2024 01:01) (63 - 71)  RR: 20 (31 Oct 2024 10:26) (17 - 25)  SpO2: 100% (31 Oct 2024 10:26) (95% - 100%)    Parameters below as of 31 Oct 2024 10:26  Patient On (Oxygen Delivery Method): mask, nonrebreather  O2 Flow (L/min): 10      PHYSICAL EXAM:  General: appears stated age, on NRB  Neck: trachea midline  Respiratory: Breathing comfortably on NRB satting 100% at bedside, no accessory muscle use  Gastrointestinal: Soft, NT, ND, +BS  Extremities: ARIAS x4  Vascular: Well perfused  Neurological: Nonfocal, no deficits  Psychiatric: Appropriate affect        LABS:                        10.4   7.53  )-----------( 210      ( 30 Oct 2024 22:10 )             34.9     10-30    139  |  98  |  21  ----------------------------<  141[H]  3.4[L]   |  27  |  3.46[H]    Ca    8.9      30 Oct 2024 22:10  Phos  TNP     10-31  Mg     2.10     10-30    TPro  8.5[H]  /  Alb  3.3  /  TBili  0.5  /  DBili  x   /  AST  24  /  ALT  8   /  AlkPhos  101  10-30    PT/INR - ( 30 Oct 2024 22:10 )   PT: 12.8 sec;   INR: 1.08 ratio         PTT - ( 30 Oct 2024 22:10 )  PTT:41.9 sec  Urinalysis Basic - ( 30 Oct 2024 22:10 )    Color: x / Appearance: x / SG: x / pH: x  Gluc: 141 mg/dL / Ketone: x  / Bili: x / Urobili: x   Blood: x / Protein: x / Nitrite: x   Leuk Esterase: x / RBC: x / WBC x   Sq Epi: x / Non Sq Epi: x / Bacteria: x        RADIOLOGY & ADDITIONAL STUDIES:    A/P:  69 y.o. man residing at [facility] with PMH ESRD on HD M/W/F, multiple CVAs with residual L eye left gaze deviation, s/p R BKA and L AKA, L AVF, HTN, HLD, CAD, CVA, dementia (AOx2-3 at baseline per niece), brought in from HD after becoming unresponsive. Had rapid response in AM on 10/31. On CT found to have possible hydroptx, thoracic consulted.    - CT chest showing left lower moderate PTX in area of multiple Thoracentesis in past  - Pt admitted to medicine  - Care per medicine team  - Satting 100% on NRB, no resp distress  - Pt w/ multiple Thoracentesis on Left side in past. Possible hydroptx from recurrent effusions  - If patient is requiring PTC, would recommend IR consult.  Above per Dr. Israel

## 2024-10-31 NOTE — ED ADULT NURSE REASSESSMENT NOTE - NS ED NURSE REASSESS COMMENT FT1
Unsuccessful attempts to draw repeat labs. Only able to draw VBG. EMILIA Lewis made aware. Provider to attempt to draw labs.
pt s/p CT chest showing left pneumothorax.  o2 sat 100% on RA.  HR WNL.  will monitor closely.
pt to be admitted for left pneumothorax.  CT a/p also showed constipation, given dulcolax.  appears to be more alert and responsive to verbal stimuli.  vitals stable.  awaiting bed assignment.
Rapid response ended at this time for hypotension. Pt received 500ml bolus. repeat EKG obtained. right upper arm ultrasound guided 20G IV placed by RRT. VS as noted. Pt remains A&Ox1. SpO2 maintaining above 95% on nonrebreather. +LAV fistula. Pt had BM. incontinence care provided. right BKA and left AKA noted. cardiac monitoring and continuous pulse oximetry maintained. Stretcher set in lowest position, call bell within reach, safety maintained.
Report received from night RN Miriam. Pt is A&Ox1 to self. Pt endorsing pain "all over". Pt hypotensive, tachypneic, and hypoxic on room air. EMILIA Lewis notified. Pt placed on nonrebreather with improvement. 500ml bolus and repeat chest x-ray ordered. primary team at bedside.

## 2024-10-31 NOTE — H&P ADULT - PROBLEM SELECTOR PLAN 7
Baseline Hg of 9-10  - Likely AOCD, based on iron studies review from 5/2024  - F/up iron studies  - Gets epogen on HD days  - F/up further Renal recs regarding Epo during HD  - Keep Hg above 8

## 2024-10-31 NOTE — H&P ADULT - PROBLEM SELECTOR PLAN 5
On HD Mon/Wed/Friday, session on 10/30 was not completed due to AMS  - Gets midodrine 7.5mg prior to HD sessions  - Hypotensive 10/31 w/ subsequent RRT, BP improved after 500cc NS  - F/up lactate, likely elevated in setting of hypovolemia  - Monitor BP closely and assess need to give daily midodrine, most recent /85  - Renal has been consulted, f/up recs

## 2024-10-31 NOTE — CONSULT NOTE ADULT - ASSESSMENT
69 year old M hx of ESRD on HD left AVF MWFr, CVA w/ gliosis, BKA/AKA functional quadriplegia, CAD, HTN, HLD, sent from dialysis unit for AMS. nephrology consulted for dialysis needs    ESRD: from [facility]  On HD [facility] via [facility]  Schedule is MWF. Consent obtained and charted from HCP Ramonita Vincent [phone]  hd 10/30  lab reviewed no urgent need for hd today  AVG has no bruit or thrill appreciated. check duplex. vascular called   f/u duplex. and vascular  - Renal diet.    AMS  metal status seems better today  work up per team    anemia  stable  monitor    htn  acceptable  monitor    PTX  f/u CTS pulm

## 2024-10-31 NOTE — CONSULT NOTE ADULT - SUBJECTIVE AND OBJECTIVE BOX
HISTORY OF PRESENT ILLNESS:    This is a 69 year old man, [facility] resident,  with past medical history of CAD, CM HFrEF EF 35-40% (TTE 7/2024), PAD with R BKA and L AKA, functionally quadriplegic, CVA (residual: L eye gaze deviation), Hypertension, Hyperlipidemia, Dementia, ESRD on HD (MWF via L AVF) presented to ED after becoming unresponsive during HD  found to have ST elevations in V3 with elevated Troponins.  Cardiology called on consult for REJI in V3 and elevated troponins.  Patient obtunded on arrival to ED.  Nephew at bedside and reports aides at [facility] reported patient more lethargic over past several days with multiple episodes of vomiting and coughing.  HD attempted today and 2 hours into HD patient become unresponsive and HD aborted.  On assessment, patient spontaneously woke up and was answering questions congruently, recognized nephew and call him by his name.  Patient states he is not having chest pain or SOB and denies pain anywhere else.  Patient A+OX1, currently SR 78, /52, lung sounds clear rr16, SATing 98% on RA, no sacral edema, temp 98.7F, JVD not observed due to beard. labs show no leukocytosis, anemia with H+H 10.4/34.9, creatinine 3.46, K++ 3.4, Troponin 622, . CKMB 2.6, Lactate 1.8, Chest X-ray with 3cm left basilar pneumothorax, ECG SR with REJI in V3.  TTE 7/2024 EF 35-40%      Allergies    No Known Allergies    Intolerances    	    MEDICATIONS:                  PAST MEDICAL & SURGICAL HISTORY:  DM (diabetes mellitus)      HTN (hypertension)      Below knee amputation status, right      MRSA (methicillin resistant Staphylococcus aureus) colonization  (hx/o MRSA growth from wound culture)      Wound  (chronic wounds to left foot and leg)      DM (diabetes mellitus)      HTN (hypertension)      Kidney disease      Stroke      ESRD on dialysis      S/P BKA (below knee amputation) unilateral, right      H/O peripheral artery bypass  left fem to below-knee pop with RSVG      Amputated left leg  above knee      Amputated right leg  below knee          FAMILY HISTORY:  Family history of diabetes mellitus        SOCIAL HISTORY:    Retired contractor, 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, 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    REVIEW OF SYSTEMS:  Unable to fully participate due to dementia    PHYSICAL EXAM:  T(C): 37 (10-31-24 @ 00:30), Max: 37 (10-31-24 @ 00:30)  HR: 80 (10-31-24 @ 00:30) (73 - 80)  BP: 89/53 (10-31-24 @ 00:30) (89/53 - 126/63)  RR: 20 (10-31-24 @ 00:30) (17 - 20)  SpO2: 98% (10-31-24 @ 00:30) (95% - 98%)  Wt(kg): --  I&O's Summary      Appearance: ill appearing  HEENT:  Dry oral mucosa  Cardiovascular: Normal S1 S2  Respiratory: Lungs clear to auscultation bilaterally  Gastrointestinal:  Soft, Non-tender, + BS	  Skin: No rashes, No ecchymoses, No cyanosis	  Neurologic: Non-focal  Extremities: R BKA, L AKA no sacral edema  Psychiatry: A & O x 1, no distress    LABS:	 	    CBC Full  -  ( 30 Oct 2024 22:10 )  WBC Count : 7.53 K/uL  Hemoglobin : 10.4 g/dL  Hematocrit : 34.9 %  Platelet Count - Automated : 210 K/uL  Mean Cell Volume : 87.5 fL  Mean Cell Hemoglobin : 26.1 pg  Mean Cell Hemoglobin Concentration : 29.8 g/dL  Auto Neutrophil # : 5.95 K/uL  Auto Lymphocyte # : 0.68 K/uL  Auto Monocyte # : 0.83 K/uL  Auto Eosinophil # : 0.02 K/uL  Auto Basophil # : 0.02 K/uL  Auto Neutrophil % : 79.0 %  Auto Lymphocyte % : 9.0 %  Auto Monocyte % : 11.0 %  Auto Eosinophil % : 0.3 %  Auto Basophil % : 0.3 %    10-30    139  |  98  |  21  ----------------------------<  141[H]  3.4[L]   |  27  |  3.46[H]    Ca    8.9      30 Oct 2024 22:10  Mg     2.10     10-30    TPro  8.5[H]  /  Alb  3.3  /  TBili  0.5  /  DBili  x   /  AST  24  /  ALT  8   /  AlkPhos  101  10-30      proBNP:   Lipid Profile:   HgA1c:   TSH:       CARDIAC MARKERS:            TELEMETRY: 	    ECG:  	  RADIOLOGY:  OTHER: 	    PREVIOUS DIAGNOSTIC TESTING:    [ ] Echocardiogram:  < from: TTE W or WO Ultrasound Enhancing Agent (07.05.24 @ 14:48) >    TRANSTHORACIC ECHOCARDIOGRAM REPORT  ________________________________________________________________________________                                      _______       Pt. Name:       JAMES PATRICK Study Date:    7/5/2024  MRN:            [id number]      YOB: 1955  Accession #:    [id number]      Age:           69 years  Account#:       [id number]       Gender:        M  Heart Rate:                    Height:        68.00 in (172.72 cm)  Rhythm:                        Weight:110.25 lb (50.01 kg)  Blood Pressure: 134/67 mmHg    BSA/BMI:       1.59 m² / 16.76 kg/m²  ________________________________________________________________________________________  Referring Physician:    [id number] Etienne Cadet  Interpreting Physician: Coy Nixon MD  Primary Sonographer:    Jennifer Beht [facility]    CPT:               ECHO TTE WO CON COMP W DOPP - 42375.m  Indication(s):     Abnormal electrocardiogram ECG/EKG - R94.31  Procedure:         Transthoracic echocardiogram with 2-D, M-mode and complete                     spectral and color flow Doppler.  Ordering Location: [facility]  Admission Status:  Inpatient  Study Information: Image quality for this study is good.    _______________________________________________________________________________________     CONCLUSIONS:      1. Left ventricular cavity is normal in size. Left ventricular systolic function is moderately decreased with an ejection fraction visually estimated at 35 to 40 %.   2. Mild left ventricular hypertrophy.   3. Normal right ventricular cavity size and normal right ventricular systolic function.   4. Structurally normal mitral valve with normal leaflet excursion.   5. Trileaflet aortic valve with reduced systolic excursion. There is calcification ofthe aortic valve leaflets.   6. The peak transaortic velocity is 1.54 m/s, peak transaortic gradient is 9.5 mmHg and mean transaortic gradient is 5.0 mmHg with an LVOT/aortic valve VTI ratio of 0.43.   7. Mild aortic regurgitation.   8. Estimated pulmonary artery systolic pressure is 23 mmHg.   9. The inferior vena cava is normal in size (normal <2.1cm) with normal inspiratory collapse (normal >50%) consistent with normal right atrial pressure (~3, range 0-5mmHg).  10. No pericardial effusion seen.  11. Left pleural effusion noted.  12. Compared to the transesophageal echocardiogram performed on 4/30/2024, there have been no significant interval changes.    ________________________________________________________________________________________  FINDINGS:     Left Ventricle:  The left ventricular cavity is normal in size. Left ventricular systolic function is moderately decreased with an ejection fraction visually estimated at 35 to 40%. Mild left ventricular hypertrophy.     Right Ventricle:  The right ventricular cavity is normal in size and right ventricular systolic function is normal. Tricuspid annular plane systolic excursion (TAPSE) is 1.4 cm (normal >=1.7 cm).     Left Atrium:  The left atrium is normal in size with an indexedvolume of 37.37 ml/m².     Right Atrium:  The right atrium is normal in size with an indexed volume of 18.28 ml/m².     Aortic Valve:  The aortic valve appears trileaflet with reduced systolic excursion. There is calcification of the aortic valve leaflets. The peak transaortic velocity is 1.54 m/s, peak transaortic gradient is 9.5 mmHg and mean transaortic gradient is 5.0 mmHg with an LVOT/aortic valve VTI ratio of 0.43. There is mild aortic regurgitation.     Mitral Valve:  Structurally normal mitral valve with normal leaflet excursion. There is calcification of the mitral valve annulus. There is trace mitral regurgitation.     Tricuspid Valve:  Structurally normal tricuspid valve with normal leaflet excursion. There is mild tricuspid regurgitation. Estimated pulmonary artery systolic pressure is 23 mmHg.     Pulmonic Valve:  Structurally normal pulmonic valve with normal leaflet excursion.     Aorta:  The aortic root at the sinuses of Valsalva is normal in size, measuring 2.80 cm (indexed 1.76 cm/m²). The ascending aorta diameter is normal in size, measuring 2.70 cm (indexed 1.70 cm/m²).     Pericardium:  No pericardial effusion seen.     Pleura:  Left pleural effusion noted.     Systemic Veins:  The inferior vena cava is normal in size (normal <2.1cm) with normal inspiratory collapse (normal >50%) consistent with normal right atrial pressure (~3, range 0-5mmHg).  ____________________________________________________________________  QUANTITATIVE DATA:  Left Ventricle Measurements: (Indexed to BSA)     IVSd (2D):   1.1 cm  LVPWd (2D):  1.2 cm  LVIDd (2D):  4.7 cm  LVIDs (2D):  3.7 cm  LV Mass:     191 g  120.4 g/m²  Visualized LV EF%: 35 to 40%     MV E Vmax:    0.55 m/s  MV A Vmax:    0.70 m/s  MV E/A:       0.78  e' lateral:   5.70 cm/s  e' medial:    4.03 cm/s  E/e' lateral: 9.56  E/e' medial:  13.52  E/e' Average: 11.20  MV DT:        265 msec    Aorta Measurements: (Normal range) (Indexed to BSA)     Ao Root d     2.80 cm (3.1 - 3.7 cm) 1.76 cm/m²  Ao Asc d, 2D: 2.70  Ao Asc prox:  2.70 cm                1.70 cm/m²       Left Atrium Measurements: (Indexed to BSA)  LA Diam 2D: 3.60 cm         Right Ventricle Measurements: Right Atrial Measurements:     TAPSE:            1.4 cm      RA Vol:       29.00 ml  RV S' Vmax:       11.50 cm/s  RA Vol Index: 18.28 ml/m²  RV Base (RVID1):  3.5 cm  RV Mid (RVID2):   2.3 cm  RV Major (RVID3): 7.0 cm       LVOT / RVOT/ Qp/Qs Data: (Indexed to BSA)  LVOT Vmax:      0.71 m/s  LVOT Vmn:       0.443 m/s  LVOT VTI:       12.70 cm  LVOT peak grad: 2 mmHg  LVOT mean grad: 1.0 mmHg    Aortic Valve Measurements:  AV Vmax:                1.5 m/s  AV Peak Gradient:       9.5 mmHg  AV Mean Gradient:       5.0 mmHg  AV VTI:                 29.5 cm  AV VTI Ratio:           0.43  AoV Dimensionless Index 0.43    Mitral Valve Measurements:     MV E Vmax: 0.5 m/s  MV A Vmax: 0.7 m/s  MV E/A:    0.8       Tricuspid Valve Measurements:     TR Vmax:          2.2 m/s  TR Peak Gradient: 19.7 mmHg  RA Pressure:      3 mmHg  PASP:  23 mmHg    ________________________________________________________________________________________  Electronically signed on 7/5/2024 at 5:08:07 PM by Coy Nixon MD    < end of copied text >    [ ] Catheterization:  [ ] Stress Test:  	  	       HISTORY OF PRESENT ILLNESS:    This is a 69 year old man, [facility] resident,  with past medical history of CAD, CM HFrEF EF 35-40% (TTE 7/2024), PAD with R BKA and L AKA, functionally quadriplegic, CVA (residual: L eye gaze deviation), Hypertension, Hyperlipidemia, Dementia, ESRD on HD (MWF via L AVF) presented to ED after becoming unresponsive during HD  found to have ST elevations in V3 with elevated Troponins.  Cardiology called on consult for REJI in V3 and elevated troponins.  Patient obtunded on arrival to ED.  Nephew at bedside and reports aides at [facility] reported patient more lethargic over past several days with multiple episodes of vomiting and coughing.  HD attempted today and 2 hours into HD patient become unresponsive and HD aborted.  On assessment, patient spontaneously woke up and was answering questions congruently, recognized nephew and call him by his name.  Patient states he is not having chest pain or SOB and denies pain anywhere else.  Patient A+OX1, currently SR 78, /52, lung sounds clear rr16, SATing 98% on RA, no sacral edema, temp 98.7F, JVD not observed due to beard. labs show no leukocytosis, anemia with H+H 10.4/34.9, creatinine 3.46, K++ 3.4, Troponin 622, . CKMB 2.6, Lactate 1.8, Chest X-ray with 3cm left basilar pneumothorax, ECG SR with REJI in V3.  TTE 7/2024 EF 35-40%. Awaiting CT Chest for evaluation of left basilar pneumothorax (3 CM). CT Head and CT ABD/Pelvis pending as well.      Allergies    No Known Allergies    Intolerances    	    MEDICATIONS:                  PAST MEDICAL & SURGICAL HISTORY:  DM (diabetes mellitus)      HTN (hypertension)      Below knee amputation status, right      MRSA (methicillin resistant Staphylococcus aureus) colonization  (hx/o MRSA growth from wound culture)      Wound  (chronic wounds to left foot and leg)      DM (diabetes mellitus)      HTN (hypertension)      Kidney disease      Stroke      ESRD on dialysis      S/P BKA (below knee amputation) unilateral, right      H/O peripheral artery bypass  left fem to below-knee pop with RSVG      Amputated left leg  above knee      Amputated right leg  below knee          FAMILY HISTORY:  Family history of diabetes mellitus        SOCIAL HISTORY:    Retired contractor, 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, 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    REVIEW OF SYSTEMS:  Unable to fully participate due to dementia    PHYSICAL EXAM:  T(C): 37 (10-31-24 @ 00:30), Max: 37 (10-31-24 @ 00:30)  HR: 80 (10-31-24 @ 00:30) (73 - 80)  BP: 89/53 (10-31-24 @ 00:30) (89/53 - 126/63)  RR: 20 (10-31-24 @ 00:30) (17 - 20)  SpO2: 98% (10-31-24 @ 00:30) (95% - 98%)  Wt(kg): --  I&O's Summary      Appearance: ill appearing  HEENT:  Dry oral mucosa  Cardiovascular: Normal S1 S2  Respiratory: Lungs clear to auscultation bilaterally  Gastrointestinal:  Soft, Non-tender, + BS	  Skin: No rashes, No ecchymoses, No cyanosis	  Neurologic: Non-focal  Extremities: R BKA, L AKA no sacral edema  Psychiatry: A & O x 1, no distress    LABS:	 	    CBC Full  -  ( 30 Oct 2024 22:10 )  WBC Count : 7.53 K/uL  Hemoglobin : 10.4 g/dL  Hematocrit : 34.9 %  Platelet Count - Automated : 210 K/uL  Mean Cell Volume : 87.5 fL  Mean Cell Hemoglobin : 26.1 pg  Mean Cell Hemoglobin Concentration : 29.8 g/dL  Auto Neutrophil # : 5.95 K/uL  Auto Lymphocyte # : 0.68 K/uL  Auto Monocyte # : 0.83 K/uL  Auto Eosinophil # : 0.02 K/uL  Auto Basophil # : 0.02 K/uL  Auto Neutrophil % : 79.0 %  Auto Lymphocyte % : 9.0 %  Auto Monocyte % : 11.0 %  Auto Eosinophil % : 0.3 %  Auto Basophil % : 0.3 %    10-30    139  |  98  |  21  ----------------------------<  141[H]  3.4[L]   |  27  |  3.46[H]    Ca    8.9      30 Oct 2024 22:10  Mg     2.10     10-30    TPro  8.5[H]  /  Alb  3.3  /  TBili  0.5  /  DBili  x   /  AST  24  /  ALT  8   /  AlkPhos  101  10-30      proBNP:   Lipid Profile:   HgA1c:   TSH:       CARDIAC MARKERS:            TELEMETRY: 	    ECG:  	  RADIOLOGY:  OTHER: 	    PREVIOUS DIAGNOSTIC TESTING:    [ ] Echocardiogram:  < from: TTE W or WO Ultrasound Enhancing Agent (07.05.24 @ 14:48) >    TRANSTHORACIC ECHOCARDIOGRAM REPORT  ________________________________________________________________________________                                      _______       Pt. Name:       JAMES PATRICK Study Date:    7/5/2024  MRN:            [id number]      YOB: 1955  Accession #:    [id number]      Age:           69 years  Account#:       [id number]       Gender:        M  Heart Rate:                    Height:        68.00 in (172.72 cm)  Rhythm:                        Weight:110.25 lb (50.01 kg)  Blood Pressure: 134/67 mmHg    BSA/BMI:       1.59 m² / 16.76 kg/m²  ________________________________________________________________________________________  Referring Physician:    [id number] Etienne Cadet  Interpreting Physician: Coy Nixon MD  Primary Sonographer:    Jennifer [facility]    CPT:               ECHO TTE WO CON COMP W DOPP - 84337.m  Indication(s):     Abnormal electrocardiogram ECG/EKG - R94.31  Procedure:         Transthoracic echocardiogram with 2-D, M-mode and complete                     spectral and color flow Doppler.  Ordering Location: [facility]  Admission Status:  Inpatient  Study Information: Image quality for this study is good.    _______________________________________________________________________________________     CONCLUSIONS:      1. Left ventricular cavity is normal in size. Left ventricular systolic function is moderately decreased with an ejection fraction visually estimated at 35 to 40 %.   2. Mild left ventricular hypertrophy.   3. Normal right ventricular cavity size and normal right ventricular systolic function.   4. Structurally normal mitral valve with normal leaflet excursion.   5. Trileaflet aortic valve with reduced systolic excursion. There is calcification ofthe aortic valve leaflets.   6. The peak transaortic velocity is 1.54 m/s, peak transaortic gradient is 9.5 mmHg and mean transaortic gradient is 5.0 mmHg with an LVOT/aortic valve VTI ratio of 0.43.   7. Mild aortic regurgitation.   8. Estimated pulmonary artery systolic pressure is 23 mmHg.   9. The inferior vena cava is normal in size (normal <2.1cm) with normal inspiratory collapse (normal >50%) consistent with normal right atrial pressure (~3, range 0-5mmHg).  10. No pericardial effusion seen.  11. Left pleural effusion noted.  12. Compared to the transesophageal echocardiogram performed on 4/30/2024, there have been no significant interval changes.    ________________________________________________________________________________________  FINDINGS:     Left Ventricle:  The left ventricular cavity is normal in size. Left ventricular systolic function is moderately decreased with an ejection fraction visually estimated at 35 to 40%. Mild left ventricular hypertrophy.     Right Ventricle:  The right ventricular cavity is normal in size and right ventricular systolic function is normal. Tricuspid annular plane systolic excursion (TAPSE) is 1.4 cm (normal >=1.7 cm).     Left Atrium:  The left atrium is normal in size with an indexedvolume of 37.37 ml/m².     Right Atrium:  The right atrium is normal in size with an indexed volume of 18.28 ml/m².     Aortic Valve:  The aortic valve appears trileaflet with reduced systolic excursion. There is calcification of the aortic valve leaflets. The peak transaortic velocity is 1.54 m/s, peak transaortic gradient is 9.5 mmHg and mean transaortic gradient is 5.0 mmHg with an LVOT/aortic valve VTI ratio of 0.43. There is mild aortic regurgitation.     Mitral Valve:  Structurally normal mitral valve with normal leaflet excursion. There is calcification of the mitral valve annulus. There is trace mitral regurgitation.     Tricuspid Valve:  Structurally normal tricuspid valve with normal leaflet excursion. There is mild tricuspid regurgitation. Estimated pulmonary artery systolic pressure is 23 mmHg.     Pulmonic Valve:  Structurally normal pulmonic valve with normal leaflet excursion.     Aorta:  The aortic root at the sinuses of Valsalva is normal in size, measuring 2.80 cm (indexed 1.76 cm/m²). The ascending aorta diameter is normal in size, measuring 2.70 cm (indexed 1.70 cm/m²).     Pericardium:  No pericardial effusion seen.     Pleura:  Left pleural effusion noted.     Systemic Veins:  The inferior vena cava is normal in size (normal <2.1cm) with normal inspiratory collapse (normal >50%) consistent with normal right atrial pressure (~3, range 0-5mmHg).  ____________________________________________________________________  QUANTITATIVE DATA:  Left Ventricle Measurements: (Indexed to BSA)     IVSd (2D):   1.1 cm  LVPWd (2D):  1.2 cm  LVIDd (2D):  4.7 cm  LVIDs (2D):  3.7 cm  LV Mass:     191 g  120.4 g/m²  Visualized LV EF%: 35 to 40%     MV E Vmax:    0.55 m/s  MV A Vmax:    0.70 m/s  MV E/A:       0.78  e' lateral:   5.70 cm/s  e' medial:    4.03 cm/s  E/e' lateral: 9.56  E/e' medial:  13.52  E/e' Average: 11.20  MV DT:        265 msec    Aorta Measurements: (Normal range) (Indexed to BSA)     Ao Root d     2.80 cm (3.1 - 3.7 cm) 1.76 cm/m²  Ao Asc d, 2D: 2.70  Ao Asc prox:  2.70 cm                1.70 cm/m²       Left Atrium Measurements: (Indexed to BSA)  LA Diam 2D: 3.60 cm         Right Ventricle Measurements: Right Atrial Measurements:     TAPSE:            1.4 cm      RA Vol:       29.00 ml  RV S' Vmax:       11.50 cm/s  RA Vol Index: 18.28 ml/m²  RV Base (RVID1):  3.5 cm  RV Mid (RVID2):   2.3 cm  RV Major (RVID3): 7.0 cm       LVOT / RVOT/ Qp/Qs Data: (Indexed to BSA)  LVOT Vmax:      0.71 m/s  LVOT Vmn:       0.443 m/s  LVOT VTI:       12.70 cm  LVOT peak grad: 2 mmHg  LVOT mean grad: 1.0 mmHg    Aortic Valve Measurements:  AV Vmax:                1.5 m/s  AV Peak Gradient:       9.5 mmHg  AV Mean Gradient:       5.0 mmHg  AV VTI:                 29.5 cm  AV VTI Ratio:           0.43  AoV Dimensionless Index 0.43    Mitral Valve Measurements:     MV E Vmax: 0.5 m/s  MV A Vmax: 0.7 m/s  MV E/A:    0.8       Tricuspid Valve Measurements:     TR Vmax:          2.2 m/s  TR Peak Gradient: 19.7 mmHg  RA Pressure:      3 mmHg  PASP:  23 mmHg    ________________________________________________________________________________________  Electronically signed on 7/5/2024 at 5:08:07 PM by Coy Nixon MD    < end of copied text >    [ ] Catheterization:  [ ] Stress Test:

## 2024-10-31 NOTE — H&P ADULT - NSHPPHYSICALEXAM_GEN_ALL_CORE
Vital Signs Last 24 Hrs  T(C): 36.4 (31 Oct 2024 09:10), Max: 37 (31 Oct 2024 00:30)  T(F): 97.6 (31 Oct 2024 09:10), Max: 98.6 (31 Oct 2024 00:30)  HR: 79 (31 Oct 2024 11:30) (66 - 80)  BP: 141/85 (31 Oct 2024 11:30) (79/45 - 149/84)  BP(mean): 71 (31 Oct 2024 01:01) (63 - 71)  RR: 20 (31 Oct 2024 11:30) (17 - 25)  SpO2: 99% (31 Oct 2024 11:30) (95% - 100%)    Parameters below as of 31 Oct 2024 11:30  Patient On (Oxygen Delivery Method): NRM      PHYSICAL EXAM:  GENERAL: Chronically ill, thin, alert, oriented x 1 (self)  HEAD:  Atraumatic, Normocephalic  ENMT: No tonsillar erythema, exudates, or enlargement; Moist mucous membranes  NECK: Supple, No JVD, Normal thyroid  NERVOUS SYSTEM:  Alert & Oriented X1 (self)  CHEST/LUNG: Poor inspiratory effort  HEART: Regular rate and rhythm; No murmurs, rubs, or gallops  ABDOMEN: Soft, Nontender, Nondistended; Bowel sounds present  EXTREMITIES: R BKA, L AKA, no edema  LYMPH: No lymphadenopathy noted  SKIN: No rashes or lesions

## 2024-10-31 NOTE — H&P ADULT - PROBLEM SELECTOR PLAN 9
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 Microvascular ischemic/involutional changes. Again seen-1.2 cm hyperdense mass in L cerebellopontine angle previously characterized as a schwannoma,stable  - MS seems to be improving as above, currently oriented to self (baseline Oriented x2 per HCP self/place)  - Functional quadriplegia  - Continue to monitor MS closely  - Dietitian evaluation, suspect severe protein calorie malnutrition  - Full Code per GOC note above. Consider Palliative Consultation pending clinical progress    # Constipation:  - CT Abd: Rectum is distended w/ stool to 7 cm. Moderate colonic stool burden. No small bowel obstruction  - S/p suppository in ER. Added bowel regimen (miralax/senna), ensure pt has regular BM's

## 2024-11-01 ENCOUNTER — RESULT REVIEW (OUTPATIENT)
Age: 69
End: 2024-11-01

## 2024-11-01 ENCOUNTER — APPOINTMENT (OUTPATIENT)
Dept: ELECTROPHYSIOLOGY | Facility: CLINIC | Age: 69
End: 2024-11-01
Payer: MEDICAID

## 2024-11-01 ENCOUNTER — NON-APPOINTMENT (OUTPATIENT)
Age: 69
End: 2024-11-01

## 2024-11-01 DIAGNOSIS — K59.00 CONSTIPATION, UNSPECIFIED: ICD-10-CM

## 2024-11-01 DIAGNOSIS — T82.868A THROMBOSIS DUE TO VASCULAR PROSTHETIC DEVICES, IMPLANTS AND GRAFTS, INITIAL ENCOUNTER: ICD-10-CM

## 2024-11-01 PROBLEM — I63.9 CEREBRAL INFARCTION, UNSPECIFIED: Chronic | Status: ACTIVE | Noted: 2024-10-30

## 2024-11-01 PROBLEM — N18.6 END STAGE RENAL DISEASE: Chronic | Status: ACTIVE | Noted: 2024-10-30

## 2024-11-01 LAB
A1C WITH ESTIMATED AVERAGE GLUCOSE RESULT: 4.7 % — SIGNIFICANT CHANGE UP (ref 4–5.6)
ANION GAP SERPL CALC-SCNC: 22 MMOL/L — HIGH (ref 7–14)
BLD GP AB SCN SERPL QL: NEGATIVE — SIGNIFICANT CHANGE UP
BUN SERPL-MCNC: 33 MG/DL — HIGH (ref 7–23)
CALCIUM SERPL-MCNC: 8.9 MG/DL — SIGNIFICANT CHANGE UP (ref 8.4–10.5)
CHLORIDE SERPL-SCNC: 94 MMOL/L — LOW (ref 98–107)
CHOLEST SERPL-MCNC: 90 MG/DL — SIGNIFICANT CHANGE UP
CO2 SERPL-SCNC: 22 MMOL/L — SIGNIFICANT CHANGE UP (ref 22–31)
CREAT SERPL-MCNC: 5.05 MG/DL — HIGH (ref 0.5–1.3)
EGFR: 12 ML/MIN/1.73M2 — LOW
ESTIMATED AVERAGE GLUCOSE: 88 — SIGNIFICANT CHANGE UP
FERRITIN SERPL-MCNC: 1635 NG/ML — HIGH (ref 30–400)
GLUCOSE SERPL-MCNC: 61 MG/DL — LOW (ref 70–99)
HCT VFR BLD CALC: 32.1 % — LOW (ref 39–50)
HDLC SERPL-MCNC: 39 MG/DL — LOW
HGB BLD-MCNC: 10 G/DL — LOW (ref 13–17)
IRON SATN MFR SERPL: 20 % — SIGNIFICANT CHANGE UP (ref 14–50)
IRON SATN MFR SERPL: 23 UG/DL — LOW (ref 45–165)
LACTATE SERPL-SCNC: 1.6 MMOL/L — SIGNIFICANT CHANGE UP (ref 0.5–2)
LIPID PNL WITH DIRECT LDL SERPL: 35 MG/DL — SIGNIFICANT CHANGE UP
MAGNESIUM SERPL-MCNC: 1.9 MG/DL — SIGNIFICANT CHANGE UP (ref 1.6–2.6)
MCHC RBC-ENTMCNC: 26.7 PG — LOW (ref 27–34)
MCHC RBC-ENTMCNC: 31.2 G/DL — LOW (ref 32–36)
MCV RBC AUTO: 85.8 FL — SIGNIFICANT CHANGE UP (ref 80–100)
NON HDL CHOLESTEROL: 51 MG/DL — SIGNIFICANT CHANGE UP
NRBC # BLD: 0 /100 WBCS — SIGNIFICANT CHANGE UP (ref 0–0)
NRBC # FLD: 0 K/UL — SIGNIFICANT CHANGE UP (ref 0–0)
NT-PROBNP SERPL-SCNC: HIGH PG/ML
PHOSPHATE SERPL-MCNC: 4.4 MG/DL — SIGNIFICANT CHANGE UP (ref 2.5–4.5)
PLATELET # BLD AUTO: 252 K/UL — SIGNIFICANT CHANGE UP (ref 150–400)
POTASSIUM SERPL-MCNC: 3.5 MMOL/L — SIGNIFICANT CHANGE UP (ref 3.5–5.3)
POTASSIUM SERPL-SCNC: 3.5 MMOL/L — SIGNIFICANT CHANGE UP (ref 3.5–5.3)
RBC # BLD: 3.74 M/UL — LOW (ref 4.2–5.8)
RBC # FLD: 17.9 % — HIGH (ref 10.3–14.5)
RH IG SCN BLD-IMP: POSITIVE — SIGNIFICANT CHANGE UP
SODIUM SERPL-SCNC: 138 MMOL/L — SIGNIFICANT CHANGE UP (ref 135–145)
TIBC SERPL-MCNC: 116 UG/DL — LOW (ref 220–430)
TRIGL SERPL-MCNC: 81 MG/DL — SIGNIFICANT CHANGE UP
UIBC SERPL-MCNC: 93 UG/DL — LOW (ref 110–370)
WBC # BLD: 7.05 K/UL — SIGNIFICANT CHANGE UP (ref 3.8–10.5)
WBC # FLD AUTO: 7.05 K/UL — SIGNIFICANT CHANGE UP (ref 3.8–10.5)

## 2024-11-01 PROCEDURE — 99152 MOD SED SAME PHYS/QHP 5/>YRS: CPT

## 2024-11-01 PROCEDURE — 99223 1ST HOSP IP/OBS HIGH 75: CPT | Mod: GC

## 2024-11-01 PROCEDURE — ZZZZZ: CPT

## 2024-11-01 PROCEDURE — 93298 REM INTERROG DEV EVAL SCRMS: CPT

## 2024-11-01 PROCEDURE — 36907 BALO ANGIOP CTR DIALYSIS SEG: CPT | Mod: LT

## 2024-11-01 PROCEDURE — 36906 THRMBC/NFS DIALYSIS CIRCUIT: CPT | Mod: LT

## 2024-11-01 PROCEDURE — 93931 UPPER EXTREMITY STUDY: CPT | Mod: 26,LT

## 2024-11-01 PROCEDURE — 76937 US GUIDE VASCULAR ACCESS: CPT | Mod: 26

## 2024-11-01 PROCEDURE — 99233 SBSQ HOSP IP/OBS HIGH 50: CPT

## 2024-11-01 PROCEDURE — 71045 X-RAY EXAM CHEST 1 VIEW: CPT | Mod: 26

## 2024-11-01 PROCEDURE — 99232 SBSQ HOSP IP/OBS MODERATE 35: CPT

## 2024-11-01 RX ORDER — CHLORHEXIDINE GLUCONATE 40 MG/ML
1 SOLUTION TOPICAL DAILY
Refills: 0 | Status: DISCONTINUED | OUTPATIENT
Start: 2024-11-01 | End: 2024-11-07

## 2024-11-01 RX ADMIN — HEPARIN SODIUM 5000 UNIT(S): 10000 INJECTION INTRAVENOUS; SUBCUTANEOUS at 06:47

## 2024-11-01 RX ADMIN — Medication 40 MILLIGRAM(S): at 20:54

## 2024-11-01 RX ADMIN — Medication 1 DROP(S): at 06:53

## 2024-11-01 RX ADMIN — MIDODRINE HYDROCHLORIDE 7.5 MILLIGRAM(S): 2.5 TABLET ORAL at 06:50

## 2024-11-01 RX ADMIN — Medication 5 MILLIGRAM(S): at 20:53

## 2024-11-01 RX ADMIN — POLYETHYLENE GLYCOL 3350 17 GRAM(S): 17 POWDER, FOR SOLUTION ORAL at 06:47

## 2024-11-01 RX ADMIN — PANTOPRAZOLE SODIUM 40 MILLIGRAM(S): 40 TABLET, DELAYED RELEASE ORAL at 06:47

## 2024-11-01 RX ADMIN — Medication 1 DROP(S): at 21:17

## 2024-11-01 RX ADMIN — PIPERACILLIN AND TAZOBACTAM 25 GRAM(S): .5; 4 INJECTION, POWDER, LYOPHILIZED, FOR SOLUTION INTRAVENOUS at 20:53

## 2024-11-01 RX ADMIN — PIPERACILLIN AND TAZOBACTAM 25 GRAM(S): .5; 4 INJECTION, POWDER, LYOPHILIZED, FOR SOLUTION INTRAVENOUS at 06:46

## 2024-11-01 RX ADMIN — Medication 2 TABLET(S): at 20:54

## 2024-11-01 NOTE — PROGRESS NOTE ADULT - SUBJECTIVE AND OBJECTIVE BOX
[facility] NEPHROLOGY [facility]ACT[facility]   MD MARIBELL CARRION MD ANGELA WONG, PA    TEL:  OFFICE: [phone]  From 5pm-7am Answering Service [phone]    -- RENAL FOLLOW UP NOTE ---Date of Service 11-01-24 @ 16:10    Patient is a 69y old  Male who presents with a chief complaint of AMS (01 Nov 2024 14:58)      Patient seen and examined at bedside. No chest pain/sob    VITALS:  T(F): 97.7 (11-01-24 @ 13:57), Max: 97.9 (11-01-24 @ 06:00)  HR: 91 (11-01-24 @ 13:57)  BP: 128/68 (11-01-24 @ 13:57)  RR: 16 (11-01-24 @ 13:57)  SpO2: 100% (11-01-24 @ 13:57)  Wt(kg): --      Weight (kg): 45.4 (11-01 @ 08:41)    PHYSICAL EXAM:  General: NAD  Neck: No JVD  Respiratory: CTAB, no wheezes, rales or rhonchi  Cardiovascular: S1, S2, RRR  Gastrointestinal: BS+, soft, NT/ND  Extremities: b/l amputation    Hospital Medications:   MEDICATIONS  (STANDING):  aspirin  chewable 81 milliGRAM(s) Oral daily  atorvastatin 40 milliGRAM(s) Oral at bedtime  heparin   Injectable 5000 Unit(s) SubCutaneous every 12 hours  lactobacillus acidophilus 1 Tablet(s) Oral daily  midodrine. 7.5 milliGRAM(s) Oral <User Schedule>  multivitamin 1 Tablet(s) Oral daily  pantoprazole    Tablet 40 milliGRAM(s) Oral before breakfast  piperacillin/tazobactam IVPB.. 3.375 Gram(s) IV Intermittent every 12 hours  polyethylene glycol 3350 17 Gram(s) Oral daily  prednisoLONE acetate 1% Suspension 1 Drop(s) Both EYES two times a day  senna 2 Tablet(s) Oral at bedtime      LABS:  11-01    138  |  94[L]  |  33[H]  ----------------------------<  61[L]  3.5   |  22  |  5.05[H]    Ca    8.9      01 Nov 2024 03:28  Phos  4.4     11-01  Mg     1.90     11-01    TPro  7.9  /  Alb  3.0[L]  /  TBili  0.5  /  DBili      /  AST  21  /  ALT  9   /  AlkPhos  90  10-31    Creatinine Trend: 5.05 <--, 3.94 <--, 3.46 <--    Phosphorus: 4.4 mg/dL (11-01 @ 03:28)  Iron Total: 23 ug/dL (11-01 @ 03:28)  Iron - Total Binding Capacity.: 116 ug/dL (11-01 @ 03:28)  Ferritin: 1635 ng/mL (11-01 @ 03:28)                              10.0   7.05  )-----------( 252      ( 01 Nov 2024 03:28 )             32.1     Urine Studies:  Urinalysis - [11-01-24 @ 03:28]      Color  / Appearance  / SG  / pH       Gluc 61 / Ketone   / Bili  / Urobili        Blood  / Protein  / Leuk Est  / Nitrite       RBC  / WBC  / Hyaline  / Gran  / Sq Epi  / Non Sq Epi  / Bacteria       Iron 23, TIBC 116, %sat 20      [11-01-24 @ 03:28]  Ferritin 1635      [11-01-24 @ 03:28]  PTH -- (Ca --)      [04-28-24 @ 05:00]   155  TSH 1.45      [07-31-24 @ 07:45]  Lipid: chol 90, TG 81, HDL 39, LDL --      [11-01-24 @ 03:28]        RADIOLOGY & ADDITIONAL STUDIES:

## 2024-11-01 NOTE — PACU DISCHARGE NOTE - COMMENTS
Operative reports printed off. Will review with Kourtney Medrano.   OK from Kourtney Medrano. Left detailed message on pt's voicemail that information placed in the mail. Also sent my chart message.   
no anesthesia complications

## 2024-11-01 NOTE — CHART NOTE - NSCHARTNOTEFT_GEN_A_CORE
Post Operative Note  Patient: JAMES PATRICK 69y (1955) Male   MRN: [id number]  Location: [place] [address] A  Visit: 10-31-24 Inpatient  Date: 11-01-24 @ 16:25    Procedure: S/P LUE AVG thrombectomy    Subjective: Patient seen and examined post operatively. Reports pain as controlled. Denies nausea, vomiting, fever, chills, chest pain, SOB, cough.      Objective:  Vitals: T(F): 97.7 (11-01-24 @ 13:57), Max: 97.9 (11-01-24 @ 06:00)  HR: 91 (11-01-24 @ 13:57)  BP: 128/68 (11-01-24 @ 13:57) (115/64 - 134/67)  RR: 16 (11-01-24 @ 13:57)  SpO2: 100% (11-01-24 @ 13:57)  Vent Settings:     In:   IV Fluids: multivitamin 1 Tablet(s) Oral daily      Physical Examination:  Constitutional: Well developed, NAD, follows commands  Pulmonary: Symmetric chest rise bilaterally, no increased WOB  Extremities: LUE with AVG palpable but no thrill, sensation intact to light touch throughout LUE, hand  intact, LUE arm flexion and extension intact, site wrapped with pressure tape, since then no active bleeding, no hematoma    Imaging:  No post-op imaging studies    Assessment:  69M w hx vascular dementia (A&Ox2 at baseline), CVAs w residual deficits (L eye deviation), CAD, HTN, HLD, anemia, PAD s/p L fem-BK pop bypass and jump graft from distal anastomosis to left PT with vein 3/29/18, later popliteal ligation 5/3/18, and ultimately R BKA and L AKA at an OSH, ESRD M/W/F via LUE AVG also created at OSH with stents noted on CXR, admitted for AMS during HD 10/30. Vascular surgery consulted for poor thrill/bruit after RRT for hypotension, concern for thrombosed AVG. Patient also with notable elevated troponin and ST changes, L PTX, and possible newly worsened LV dysfunction. AVG with no thrill. Now s/p LUE AVG thrombectomy on 11/1.    Recs:  - HD per nephro, no urgent need for HD today   - f/u duplex  - Remainder of care per primary appreciated    Vascular Surgery  [id number]    Date/Time: 11-01-24 @ 16:25 Post Operative Note  Patient: JAMES PATRICK 69y (1955) Male   MRN: [id number]  Location: [place] [address] A  Visit: 10-31-24 Inpatient  Date: 11-01-24 @ 16:25    Procedure: S/P LUE AVG thrombectomy    Subjective: Patient seen and examined post operatively. Reports pain as controlled. Denies nausea, vomiting, fever, chills, chest pain, SOB, cough.      Objective:  Vitals: T(F): 97.7 (11-01-24 @ 13:57), Max: 97.9 (11-01-24 @ 06:00)  HR: 91 (11-01-24 @ 13:57)  BP: 128/68 (11-01-24 @ 13:57) (115/64 - 134/67)  RR: 16 (11-01-24 @ 13:57)  SpO2: 100% (11-01-24 @ 13:57)  Vent Settings:     In:   IV Fluids: multivitamin 1 Tablet(s) Oral daily      Physical Examination:  Constitutional: Well developed, NAD, follows commands  Pulmonary: Symmetric chest rise bilaterally, no increased WOB  Extremities: LUE with AVG palpable but no thrill, sensation intact to light touch throughout LUE, hand  intact, LUE arm flexion and extension intact, site wrapped with pressure tape, since then no active bleeding, no hematoma    Imaging:  No post-op imaging studies    Assessment:  69M w hx vascular dementia (A&Ox2 at baseline), CVAs w residual deficits (L eye deviation), CAD, HTN, HLD, anemia, PAD s/p L fem-BK pop bypass and jump graft from distal anastomosis to left PT with vein 3/29/18, later popliteal ligation 5/3/18, and ultimately R BKA and L AKA at an OSH, ESRD M/W/F via LUE AVG also created at OSH with stents noted on CXR, admitted for AMS during HD 10/30. Vascular surgery consulted for poor thrill/bruit after RRT for hypotension, concern for thrombosed AVG. Patient also with notable elevated troponin and ST changes, L PTX, and possible newly worsened LV dysfunction. AVG with no thrill. Now s/p LUE AVG thrombectomy on 11/1.    Recs:  - HD per nephro, no urgent need for HD today   - Remainder of care per primary appreciated    Vascular Surgery  [id number]    Date/Time: 11-01-24 @ 16:25

## 2024-11-01 NOTE — CHART NOTE - NSCHARTNOTEFT_GEN_A_CORE
Patient s/p LUE angiogram today via left radial site and AV fistula site. Site is stable with no hematoma, active bleeding, or swelling. Dressing is clean, dry, and intact. Pulses are palpable.

## 2024-11-01 NOTE — PROGRESS NOTE ADULT - PROBLEM SELECTOR PLAN 4
- US duplex from 11/1/24 notable for: Completely thrombosed left upper extremity arteriovenous graft.  - sp LUE fistulogram and thrombectomy on 11/1/24  - f/u vascular recs post op   - monitor site for any bleeding/hematoma  - active t/s, trend H/H  - HD planning via AVF per nephro

## 2024-11-01 NOTE — PROGRESS NOTE ADULT - ASSESSMENT
69 year old man, [facility] resident,  with hx/p CAD, CM HFrEF EF 35-40% (TTE 7/2024), PAD with R BKA and L AKA, functionally quadriplegic, CVA (residual: L eye gaze deviation), HTN, HLD, Dementia, ESRD on HD (MWF via L AVF) presented to ED after becoming unresponsive during HD. In the ED he was initially HDS on RA. Noted to have elevated troponin and ECG SR with REJI in V3. Initial CXR demonstrated 3cm left basilar pneumothorax. CT chest moderate left PTX. No mediastinal shift. Overnight, RRT called for hypotension and hypoxia. BP 70s/50s improved with  cc bolus and SPO2 100% on NRB. Repeat CXR demonstrating unchanged basilar PTX. Pt with hx/o multiple L sided thoracentesis. Pulmonology was consulted for further evaluation of this basilar PTX. Bedside POCUS demonstrated basilar and lateral PTX. Lateral lung point. Lung pulse noted anteriorly. Possible bronchopleural fistula noted in the loculated space on CT causing PTX    #dyspnea  #hypoxemia  #pneumothorax  #?brochopleural fistula    Recommendations:  - currently on RA, continue to monitor  - patient with dark brown/yellow-color sputum and oral secretions, reasonable to obtain sputum culture and start abx. Pt with known hx/o MRSA  - no acute intervention warranted for PTX at this time  - serial chest x-rays to assess PTX  - avoid bilevel ventilation  - f/u additional cardiology recs regarding elevated troponin and associated EKG changes and repeat TTE      Discussed with Pulmonology/Critical Care [facility] Dr. Fozia Correa and Attending Dr. Pb Adams

## 2024-11-01 NOTE — PROGRESS NOTE ADULT - SUBJECTIVE AND OBJECTIVE BOX
- No events overnight. Denies CP, SOB or Palpitations.     -------------------------------------------------------------------------------------------  VS:  T(F): 98 (), Max: 98 ()  HR: 96 () (65 - 96)  BP: 117/63 () (115/64 - 134/67)  RR: 18 ()  SpO2: 100% ()    PHYSICAL EXAM:  GENERAL: NAD  HEAD: Atraumatic, Normocephalic.  ENT: Moist mucous membranes.  NECK: Supple, No JVD.  CHEST/LUNG: Clear to auscultation bilaterally; No rales, rhonchi, wheezing, or rubs. Unlabored respirations.  HEART: Regular rate and rhythm; No murmurs, rubs, or gallops.  ABDOMEN: Bowel sounds present; Soft, Nontender, Nondistended.   EXTREMITIES: No edema. 2+ Peripheral Pulses, brisk capillary refill. No clubbing or cyanosis.     -------------------------------------------------------------------------------------------  LABS:               10.0   7.05  )-----------( 252      ( 2024 03:28 )             32.1         138  |  94[L]  |  33[H]  ----------------------------<  61[L]  3.5   |  22  |  5.05[H]    Ca    8.9      2024 03:28  Phos  4.4       Mg     1.90         TPro  7.9  /  Alb  3.0[L]  /  TBili  0.5  /  DBili  x   /  AST  21  /  ALT  9   /  AlkPhos  90  10-31    PT/INR - ( 30 Oct 2024 22:10 )   PT: 12.8 sec;   INR: 1.08 ratio         PTT - ( 30 Oct 2024 22:10 )  PTT:41.9 sec  CARDIAC MARKERS ( 31 Oct 2024 11:58 )  506 ng/L / x     / x     / x     / x     / x      CARDIAC MARKERS ( 31 Oct 2024 04:00 )  604 ng/L / x     / x     / x     / x     / x      CARDIAC MARKERS ( 30 Oct 2024 22:10 )  622 ng/L / x     / x     / x     / x     / 2.6 ng/mL      Total Cholesterol: 90  LDL: --  HDL: 39  T    -------------------------------------------------------------------------------------------  Meds:  acetaminophen     Tablet .. 650 milliGRAM(s) Oral every 6 hours PRN  aspirin  chewable 81 milliGRAM(s) Oral daily  atorvastatin 40 milliGRAM(s) Oral at bedtime  bisacodyl 5 milliGRAM(s) Oral at bedtime  chlorhexidine 2% Cloths 1 Application(s) Topical daily  heparin   Injectable 5000 Unit(s) SubCutaneous every 12 hours  lactobacillus acidophilus 1 Tablet(s) Oral daily  midodrine. 7.5 milliGRAM(s) Oral <User Schedule>  multivitamin 1 Tablet(s) Oral daily  pantoprazole    Tablet 40 milliGRAM(s) Oral before breakfast  piperacillin/tazobactam IVPB.. 3.375 Gram(s) IV Intermittent every 12 hours  polyethylene glycol 3350 17 Gram(s) Oral daily  prednisoLONE acetate 1% Suspension 1 Drop(s) Both EYES two times a day  senna 2 Tablet(s) Oral at bedtime  sorbitol 70%/mineral oil/magnesium hydroxide/glycerin Enema 120 milliLiter(s) Rectal once    -------------------------------------------------------------------------------------------  Cardiovascular Diagnostic Testing:    ECG:     Echo:     Stress Testing:    VA duplex:    Completely thrombosed left upper extremity arteriovenous graft.      -------------------------------------------------------------------------------------------   - No events overnight. Denies CP, SOB or Palpitations.   - s/p AVG thrombectomy today    -------------------------------------------------------------------------------------------  VS:  T(F): 98 (), Max: 98 ()  HR: 96 () (65 - 96)  BP: 117/63 () (115/64 - 134/67)  RR: 18 ()  SpO2: 100% ()    PHYSICAL EXAM:  GENERAL: NAD  NECK: JVP not elevated  CHEST/LUNG: unlabored respirations. No wheezing. Basilar crackles   HEART: RRR, no significant murmurs  ABDOMEN: soft, mild tenderness, non-distended  EXTREMITIES: bilateral amputations. no edema, warm    -------------------------------------------------------------------------------------------  LABS:               10.0   7.05  )-----------( 252      ( 2024 03:28 )             32.1         138  |  94[L]  |  33[H]  ----------------------------<  61[L]  3.5   |  22  |  5.05[H]    Ca    8.9      2024 03:28  Phos  4.4       Mg     1.90         TPro  7.9  /  Alb  3.0[L]  /  TBili  0.5  /  DBili  x   /  AST  21  /  ALT  9   /  AlkPhos  90  10-31    PT/INR - ( 30 Oct 2024 22:10 )   PT: 12.8 sec;   INR: 1.08 ratio         PTT - ( 30 Oct 2024 22:10 )  PTT:41.9 sec  CARDIAC MARKERS ( 31 Oct 2024 11:58 )  506 ng/L / x     / x     / x     / x     / x      CARDIAC MARKERS ( 31 Oct 2024 04:00 )  604 ng/L / x     / x     / x     / x     / x      CARDIAC MARKERS ( 30 Oct 2024 22:10 )  622 ng/L / x     / x     / x     / x     / 2.6 ng/mL      Total Cholesterol: 90  LDL: --  HDL: 39  T    -------------------------------------------------------------------------------------------  Meds:  acetaminophen     Tablet .. 650 milliGRAM(s) Oral every 6 hours PRN  aspirin  chewable 81 milliGRAM(s) Oral daily  atorvastatin 40 milliGRAM(s) Oral at bedtime  bisacodyl 5 milliGRAM(s) Oral at bedtime  chlorhexidine 2% Cloths 1 Application(s) Topical daily  heparin   Injectable 5000 Unit(s) SubCutaneous every 12 hours  lactobacillus acidophilus 1 Tablet(s) Oral daily  midodrine. 7.5 milliGRAM(s) Oral <User Schedule>  multivitamin 1 Tablet(s) Oral daily  pantoprazole    Tablet 40 milliGRAM(s) Oral before breakfast  piperacillin/tazobactam IVPB.. 3.375 Gram(s) IV Intermittent every 12 hours  polyethylene glycol 3350 17 Gram(s) Oral daily  prednisoLONE acetate 1% Suspension 1 Drop(s) Both EYES two times a day  senna 2 Tablet(s) Oral at bedtime  sorbitol 70%/mineral oil/magnesium hydroxide/glycerin Enema 120 milliLiter(s) Rectal once    -------------------------------------------------------------------------------------------  Cardiovascular Diagnostic Testing:    ECG: sinus rhythm with LVH and repolarization changes    Echo:     2024:   1. Left ventricular cavity is normal in size. Left ventricular systolic function is moderately decreased with an ejection fraction visually estimated at 35 to 40 %.   2. Mild left ventricular hypertrophy.   3. Normal right ventricular cavity size and normal right ventricular systolic function.   4. Structurally normal mitral valve with normal leaflet excursion.   5. Trileaflet aortic valve with reduced systolic excursion. There is calcification ofthe aortic valve leaflets.   6. The peak transaortic velocity is 1.54 m/s, peak transaortic gradient is 9.5 mmHg and mean transaortic gradient is 5.0 mmHg with an LVOT/aortic valve VTI ratio of 0.43.   7. Mild aortic regurgitation.   8. Estimated pulmonary artery systolic pressure is 23 mmHg.   9. The inferior vena cava is normal in size (normal <2.1cm) with normal inspiratory collapse (normal >50%) consistent with normal right atrial pressure (~3, range 0-5mmHg).  10. No pericardial effusion seen.  11. Left pleural effusion noted.  12. Compared to the transesophageal echocardiogram performed on 2024, there have been no significant interval changes.    VA duplex:    Completely thrombosed left upper extremity arteriovenous graft.        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 Guidewire crossing was  successful.    Patient: JAMES PATRICK            MRN: [id number]  Study Date: 2024   10:25 AM      Page 1 of 4          A successful Thrombectomy was performed using a InThrill 8 Fr. X 6cm  Sheath, and a InThrill 8 Fr. X 4-10mm Thrombectomy  Catheter.      This attempt was executed for 0.0 seconds with 0 ml output volume.     A successful Balloon angioplasty was performed using a 9 X 4 X 75  Conquest.    The inflation pressure was 0 thomas for the duration of 43.0 seconds.     The inflation pressure was 0 thomas for the duration of 3.0 seconds.     The inflation pressure was 0 thomas for the duration of 144.0 seconds.     The inflation pressure was 0 thomas for the duration of 9.0 seconds.     The inflation pressure was 0 thomas for the duration of 51.0 seconds.     A successful Balloon angioplasty was performed using a 12 X 60 X 75  .    The inflation pressure was 10 thomas for the duration of 113.0 seconds.      The inflation pressure was 0 thomas for the duration of 72.0 seconds.     A successful Viabahn stent was deployed.      The inflation pressure was 12 thomas for the duration of 22.0 seconds.     A successful Balloon angioplasty was performed using a 4 X 40 X 75  .    The inflation pressure was 10 thomas for the duration of 110.0 seconds.      Following intervention there is a 1 % residual stenosis.      Left Subclavian: The initial stenosis was 90 %. Guidewire crossing was  successful.    A successful Balloon angioplasty was performed using a 5 Fr. Angled  Glidecath 65cm, and a 12 X 60 X 75 .    The inflation pressure was 10 thomas for the duration of 123.0 seconds.    -------------------------------------------------------------------------------------------

## 2024-11-01 NOTE — PROGRESS NOTE ADULT - ASSESSMENT
69M w hx vascular dementia (A&Ox2 at baseline), CVAs w residual deficits (L eye deviation), CAD, HTN, HLD, anemia, PAD s/p L fem-BK pop bypass and jump graft from distal anastomosis to left PT with vein 3/29/18, later popliteal ligation 5/3/18, and ultimately R BKA and L AKA at an OSH, ESRD M/W/F via LUE AVG also created at OSH with stents noted on CXR, admitted for AMS during HD 10/30. Vascular surgery consulted for poor thrill/bruit after RRT for hypotension, concern for thrombosed AVG. Patient also with notable elevated troponin and ST changes, L PTX, and possible newly worsened LV dysfunction. AVG with no thrill.    Recs:  - Plan for LUE fistulogram today 11/1, possible thrombectomy; please preop (NPO at MN, 4AM labs including T&S)        - Will discuss with family pending final evaluation by attending and medical status  - HD per nephro, no urgent need for HD today   - f/u duplex  - Please document medical clearance for AVFistulogram  - Please document cardiac clearance for AVFistulogram  - Remainder of care per primary appreciated  - Recc GOC discussion with HCP, vascular will call for consent    Vascular Surgery  [id number] 69M w hx vascular dementia (A&Ox2 at baseline), CVAs w residual deficits (L eye deviation), CAD, HTN, HLD, anemia, PAD s/p L fem-BK pop bypass and jump graft from distal anastomosis to left PT with vein 3/29/18, later popliteal ligation 5/3/18, and ultimately R BKA and L AKA at an OSH, ESRD M/W/F via LUE AVG also created at OSH with stents noted on CXR, admitted for AMS during HD 10/30. Vascular surgery consulted for poor thrill/bruit after RRT for hypotension, concern for thrombosed AVG. Patient also with notable elevated troponin and ST changes, L PTX, and possible newly worsened LV dysfunction. AVG with no thrill.    Recs:  - Plan for LUE fistulogram today 11/1, possible thrombectomy; please preop (NPO at MN, 4AM labs including T&S)        - Will discuss with family pending final evaluation by attending and medical status  - HD per nephro, no urgent need for HD today   - f/u duplex  - Please document medical clearance for AVFistulogram  - Please document cardiac clearance for AVFistulogram  - Remainder of care per primary appreciated  - GOC discussion with HCP, vascular will call for consent    Vascular Surgery  [id number]

## 2024-11-01 NOTE — PROGRESS NOTE ADULT - SUBJECTIVE AND OBJECTIVE BOX
SURGERY DAILY PROGRESS NOTE:     SUBJECTIVE: Patient seen and evaluated on AM rounds. Pain is adequately controlled on current regimen.    OBJECTIVE:  Vital Signs Last 24 Hrs  T(C): 36.6 (01 Nov 2024 06:00), Max: 36.6 (01 Nov 2024 06:00)  T(F): 97.9 (01 Nov 2024 06:00), Max: 97.9 (01 Nov 2024 06:00)  HR: 90 (01 Nov 2024 06:00) (66 - 90)  BP: 125/65 (01 Nov 2024 06:00) (79/45 - 149/84)  BP(mean): --  RR: 18 (01 Nov 2024 06:00) (18 - 25)  SpO2: 97% (01 Nov 2024 06:00) (95% - 100%)    Parameters below as of 01 Nov 2024 06:00  Patient On (Oxygen Delivery Method): room air      I&O's Detail    Daily     Daily     LABS:                        10.0   7.05  )-----------( 252      ( 01 Nov 2024 03:28 )             32.1     11-01    138  |  94[L]  |  33[H]  ----------------------------<  61[L]  3.5   |  22  |  5.05[H]    Ca    8.9      01 Nov 2024 03:28  Phos  4.4     11-01  Mg     1.90     11-01    TPro  7.9  /  Alb  3.0[L]  /  TBili  0.5  /  DBili  x   /  AST  21  /  ALT  9   /  AlkPhos  90  10-31    PT/INR - ( 30 Oct 2024 22:10 )   PT: 12.8 sec;   INR: 1.08 ratio         PTT - ( 30 Oct 2024 22:10 )  PTT:41.9 sec  Urinalysis Basic - ( 01 Nov 2024 03:28 )    Color: x / Appearance: x / SG: x / pH: x  Gluc: 61 mg/dL / Ketone: x  / Bili: x / Urobili: x   Blood: x / Protein: x / Nitrite: x   Leuk Esterase: x / RBC: x / WBC x   Sq Epi: x / Non Sq Epi: x / Bacteria: x      PHYSICAL EXAM:  Constitutional: Well developed, NAD, follows commands  Pulmonary: Symmetric chest rise bilaterally, no increased WOB  Extremities: LUE with AVG palpable but no thrill, sensation intact to light touch throughout LUE, hand  intact, LUE arm flexion and extension intact

## 2024-11-01 NOTE — PROVIDER CONTACT NOTE (OTHER) - ASSESSMENT
Patient A&Ox2-3. patient complains of belly pain and feels like he has to go to bathroom. Patient A&Ox2-3. patient complains of belly pain and feels like he has to go to bathroom. 10pm senna was held since patient had 3 large BMs during day shift and 1 large soft BM this evening.

## 2024-11-01 NOTE — PRE PROCEDURE NOTE - PRE PROCEDURE EVALUATION
Pre Procedural Sedation Evaluation    Proceduralist: Dr. RADHA Winter     History and physical reviewed  Medications reviewed  Labs reviewed  EKG reviewed    Anticoagulation: None   Urine pregnancy: N/A  Dentures: None  Last PO intake: NPO after midnight     Pre-Procedure Physical Assessment  Mental status: Alert and oriented x 2(to self and place)   Level of consciousness: 1  Cardiac assessment: Stable  Pulmonary assessment: Stable  Obstructive sleep apnea: No  Aspiration risk: No  Mallampati score: 2  ASA Classification: 2  Prior Sedative or Anesthesia Experience: No complications  Informed consent by responsible adult: Yes  Based on today's assessment, anesthesia consult requested: No

## 2024-11-01 NOTE — PROGRESS NOTE ADULT - ASSESSMENT
70 y/o male with PMHx of ESRD on HD (via AVG), CAD, HFrEF (~35-40%), PAD s/p R-BKA and L-AKA, CVA, dementia, who was transferred from dialysis center for an episode of change in mental status. Found to have L-basilar pneumothorax (likely chronic), thrombosis of AV graft, elevated troponin and abnormal EKG. Cardiology consulted for an abnormal EKG and elevated troponin.    - Elevated troponin  - Abnormal EKG  - AVG thrombosis s/p thrombectomy + stent  - L-subclavian stenosis s/p balloon angioplasty  - Chronic conditions: CAD, CVA, ESRD on HD, HFrEF (EF ~35-40%)    Elevated troponin likely in setting in demand ischemia. EKG with signs of LVH and repolarization changes. No findings concerning for acute coronary syndrome. No clear indication for invasive coronary evaluation.     Currently appears euvolemic and hemodynamically stable.    Was found to have thrombosis of AV graft. Underwent diagnostic angiogram, which showed 100% occlusion of AV graft and 80% stenosis of L-subclavian artery. Underwent thrombectomy + balloon angioplasty + stent of the AV graft and balloon angioplasty of the subclavian stenosis today.    RECOMMENDATIONS:  - continue ASA 81 mg daily + atorvastatin 40 mg daily  - would follow-up with vascular surgery regarding need for DAPT vs SAPT vs anticoagulation for AV graft stent  - volume optimization with dialysis as per nephrology  - hold GDMT given concern for soft BP yesterday         Lázaro Robles (PGY 4)  Cardiology Fellow      Of note, these recommendations are preliminary. Case to be discussed with attending.

## 2024-11-01 NOTE — PROGRESS NOTE ADULT - SUBJECTIVE AND OBJECTIVE BOX
Interval Events: Patient was seen and examined at bedside. No overnight events.    REVIEW OF SYSTEMS:  Constitutional: [ ] fevers [ ] chills [ ] weight loss [ ] weight gain  CV: [ ] chest pain [ ] orthopnea [ ] palpitations [ ] murmur  Resp: [ ] cough [ ] shortness of breath [ ] dyspnea [ ] wheezing [ ] sputum [ ] hemoptysis  [ ] All other systems negative  [ ] Unable to assess ROS because ________    OBJECTIVE:  ICU Vital Signs Last 24 Hrs  T(C): 36.6 (01 Nov 2024 08:27), Max: 36.6 (01 Nov 2024 06:00)  T(F): 97.8 (01 Nov 2024 08:27), Max: 97.9 (01 Nov 2024 06:00)  HR: 89 (01 Nov 2024 08:27) (74 - 90)  BP: 122/67 (01 Nov 2024 08:27) (122/67 - 149/84)  BP(mean): --  ABP: --  ABP(mean): --  RR: 16 (01 Nov 2024 08:27) (16 - 20)  SpO2: 100% (01 Nov 2024 08:27) (97% - 100%)    O2 Parameters below as of 01 Nov 2024 06:00  Patient On (Oxygen Delivery Method): room air              CAPILLARY BLOOD GLUCOSE      POCT Blood Glucose.: 129 mg/dL (31 Oct 2024 12:00)      PHYSICAL EXAM:        HOSPITAL MEDICATIONS:  MEDICATIONS  (STANDING):  aspirin  chewable 81 milliGRAM(s) Oral daily  atorvastatin 40 milliGRAM(s) Oral at bedtime  heparin   Injectable 5000 Unit(s) SubCutaneous every 12 hours  lactobacillus acidophilus 1 Tablet(s) Oral daily  midodrine. 7.5 milliGRAM(s) Oral <User Schedule>  multivitamin 1 Tablet(s) Oral daily  pantoprazole    Tablet 40 milliGRAM(s) Oral before breakfast  piperacillin/tazobactam IVPB.. 3.375 Gram(s) IV Intermittent every 12 hours  polyethylene glycol 3350 17 Gram(s) Oral daily  prednisoLONE acetate 1% Suspension 1 Drop(s) Both EYES two times a day  senna 2 Tablet(s) Oral at bedtime    MEDICATIONS  (PRN):  acetaminophen     Tablet .. 650 milliGRAM(s) Oral every 6 hours PRN Temp greater or equal to 38C (100.4F), Mild Pain (1 - 3)      LABS:                        10.0   7.05  )-----------( 252      ( 01 Nov 2024 03:28 )             32.1     Hgb Trend: 10.0<--, 10.3<--, 10.4<--  11-01    138  |  94[L]  |  33[H]  ----------------------------<  61[L]  3.5   |  22  |  5.05[H]    Ca    8.9      01 Nov 2024 03:28  Phos  4.4     11-01  Mg     1.90     11-01    TPro  7.9  /  Alb  3.0[L]  /  TBili  0.5  /  DBili  x   /  AST  21  /  ALT  9   /  AlkPhos  90  10-31    Creatinine Trend: 5.05<--, 3.94<--, 3.46<--  PT/INR - ( 30 Oct 2024 22:10 )   PT: 12.8 sec;   INR: 1.08 ratio         PTT - ( 30 Oct 2024 22:10 )  PTT:41.9 sec  Urinalysis Basic - ( 01 Nov 2024 03:28 )    Color: x / Appearance: x / SG: x / pH: x  Gluc: 61 mg/dL / Ketone: x  / Bili: x / Urobili: x   Blood: x / Protein: x / Nitrite: x   Leuk Esterase: x / RBC: x / WBC x   Sq Epi: x / Non Sq Epi: x / Bacteria: x      Arterial Blood Gas:  10-31 @ 11:55  7.38/47/99/28/97.9/2.0  ABG lactate: --    Venous Blood Gas:  10-31 @ 10:15  7.40/43/63/27/92.7  VBG Lactate: 3.0  Venous Blood Gas:  10-31 @ 09:07  7.31/20/70/10/96.3  VBG Lactate: 1.1  Venous Blood Gas:  10-30 @ 22:10  7.37/56/39/32/62.8  VBG Lactate: 1.8    MICROBIOLOGY:     RADIOLOGY:  [ ] Reviewed and interpreted by me

## 2024-11-01 NOTE — PROGRESS NOTE ADULT - SUBJECTIVE AND OBJECTIVE BOX
[facility] Department of Hospital Medicine  Ariel Alcantar DO  Available on MS Teams  Pager: [phone]    Patient is a 69y old  Male who presents with a chief complaint of AMS (01 Nov 2024 09:08)    SUBJECTIVE: Pt seen and examined at bedside this afternoon sp L fistulogram and thrombectomy earlier today after notable for thrombosed AVF on US of the L arm this morning.       ADDITIONAL REVIEW OF SYSTEMS:    MEDICATIONS  (STANDING):  aspirin  chewable 81 milliGRAM(s) Oral daily  atorvastatin 40 milliGRAM(s) Oral at bedtime  heparin   Injectable 5000 Unit(s) SubCutaneous every 12 hours  lactobacillus acidophilus 1 Tablet(s) Oral daily  midodrine. 7.5 milliGRAM(s) Oral <User Schedule>  multivitamin 1 Tablet(s) Oral daily  pantoprazole    Tablet 40 milliGRAM(s) Oral before breakfast  piperacillin/tazobactam IVPB.. 3.375 Gram(s) IV Intermittent every 12 hours  polyethylene glycol 3350 17 Gram(s) Oral daily  prednisoLONE acetate 1% Suspension 1 Drop(s) Both EYES two times a day  senna 2 Tablet(s) Oral at bedtime    MEDICATIONS  (PRN):  acetaminophen     Tablet .. 650 milliGRAM(s) Oral every 6 hours PRN Temp greater or equal to 38C (100.4F), Mild Pain (1 - 3)      CAPILLARY BLOOD GLUCOSE        I&O's Summary      PHYSICAL EXAM:  Vital Signs Last 24 Hrs  T(C): 36.5 (01 Nov 2024 13:57), Max: 36.6 (01 Nov 2024 06:00)  T(F): 97.7 (01 Nov 2024 13:57), Max: 97.9 (01 Nov 2024 06:00)  HR: 91 (01 Nov 2024 13:57) (65 - 91)  BP: 128/68 (01 Nov 2024 13:57) (115/64 - 134/67)  BP(mean): --  RR: 16 (01 Nov 2024 13:57) (13 - 20)  SpO2: 100% (01 Nov 2024 13:57) (97% - 100%)    Parameters below as of 01 Nov 2024 13:57  Patient On (Oxygen Delivery Method): room air        CONSTITUTIONAL: NAD, well-developed  HEAD: Normocephalic, atraumatic  EYES: EOMI, PERRL  ENT: no rhinorrhea, no pharyngeal erythema  RESPIRATORY: No increased work of breathing, CTAB, no wheezes or crackles appreciated  CARDIOVASCULAR: RRR, S1 and S2 present, no m/r/g  ABDOMEN: soft, NT, ND, bowel sounds present  EXTREMITIES: No LE edema  MUSCULOSKELETAL: no joint swelling, no tenderness to palpation  NEURO: A&Ox3, moving all extremities    LABS:                        10.0   7.05  )-----------( 252      ( 01 Nov 2024 03:28 )             32.1     11-01    138  |  94[L]  |  33[H]  ----------------------------<  61[L]  3.5   |  22  |  5.05[H]    Ca    8.9      01 Nov 2024 03:28  Phos  4.4     11-01  Mg     1.90     11-01    TPro  7.9  /  Alb  3.0[L]  /  TBili  0.5  /  DBili  x   /  AST  21  /  ALT  9   /  AlkPhos  90  10-31    PT/INR - ( 30 Oct 2024 22:10 )   PT: 12.8 sec;   INR: 1.08 ratio         PTT - ( 30 Oct 2024 22:10 )  PTT:41.9 sec  CARDIAC MARKERS ( 30 Oct 2024 22:10 )  x     / x     / x     / x     / 2.6 ng/mL      Urinalysis Basic - ( 01 Nov 2024 03:28 )    Color: x / Appearance: x / SG: x / pH: x  Gluc: 61 mg/dL / Ketone: x  / Bili: x / Urobili: x   Blood: x / Protein: x / Nitrite: x   Leuk Esterase: x / RBC: x / WBC x   Sq Epi: x / Non Sq Epi: x / Bacteria: x        Culture - Blood (collected 30 Oct 2024 23:10)  Source: .Blood BLOOD  Preliminary Report (01 Nov 2024 03:01):    No growth at 24 hours    Culture - Blood (collected 30 Oct 2024 22:10)  Source: .Blood BLOOD  Preliminary Report (01 Nov 2024 03:01):    No growth at 24 hours        RADIOLOGY & ADDITIONAL TESTS:  < from: Xray Chest 1 View- PORTABLE-Routine (Xray Chest 1 View- PORTABLE-Routine .) (11.01.24 @ 13:38) >  EXAM: Portable chest    FINDINGS:    Left basilar pneumothorax not significantly changed.  Left and right lungs are free of focal consolidations.  Heart size is normal.        COMPARISON: October 31        IMPRESSION: Follow-up left pneumothorax with similar findings.    --- End of Report ---    < end of copied text >    COORDINATION OF CARE:  Care Discussed with Consultants/Other Providers [Y/N]:  Prior or Outpatient Records Reviewed [Y/N]:    DISCHARGE PLANNING: pending clinical course          [facility] of [facility] Medicine  Ariel Alcantar DO  Available on MS Teams  Pager: [phone]    Patient is a 69y old  Male who presents with a chief complaint of AMS (01 Nov 2024 09:08)    SUBJECTIVE: Pt seen and examined at bedside this afternoon sp L fistulogram and thrombectomy earlier today after notable for thrombosed AVF on US of the L arm this morning. Doing well post op without hematoma and palpable distal pulses. AAOx1 (name), knows he is from [place] and lives with brother and sister, not sure of location/date/year. Reports no active cp, sob, fevers, chills, abd pain, n/v/d/c. planned for HD via AVF graft today per nephrology. Will attempt enema again today for stool burden. Follows commands, able to answer questions appropriately as well.       ADDITIONAL REVIEW OF SYSTEMS:    MEDICATIONS  (STANDING):  aspirin  chewable 81 milliGRAM(s) Oral daily  atorvastatin 40 milliGRAM(s) Oral at bedtime  heparin   Injectable 5000 Unit(s) SubCutaneous every 12 hours  lactobacillus acidophilus 1 Tablet(s) Oral daily  midodrine. 7.5 milliGRAM(s) Oral <User Schedule>  multivitamin 1 Tablet(s) Oral daily  pantoprazole    Tablet 40 milliGRAM(s) Oral before breakfast  piperacillin/tazobactam IVPB.. 3.375 Gram(s) IV Intermittent every 12 hours  polyethylene glycol 3350 17 Gram(s) Oral daily  prednisoLONE acetate 1% Suspension 1 Drop(s) Both EYES two times a day  senna 2 Tablet(s) Oral at bedtime    MEDICATIONS  (PRN):  acetaminophen     Tablet .. 650 milliGRAM(s) Oral every 6 hours PRN Temp greater or equal to 38C (100.4F), Mild Pain (1 - 3)      CAPILLARY BLOOD GLUCOSE        I&O's Summary      PHYSICAL EXAM:  Vital Signs Last 24 Hrs  T(C): 36.5 (01 Nov 2024 13:57), Max: 36.6 (01 Nov 2024 06:00)  T(F): 97.7 (01 Nov 2024 13:57), Max: 97.9 (01 Nov 2024 06:00)  HR: 91 (01 Nov 2024 13:57) (65 - 91)  BP: 128/68 (01 Nov 2024 13:57) (115/64 - 134/67)  BP(mean): --  RR: 16 (01 Nov 2024 13:57) (13 - 20)  SpO2: 100% (01 Nov 2024 13:57) (97% - 100%)    Parameters below as of 01 Nov 2024 13:57  Patient On (Oxygen Delivery Method): room air        CONSTITUTIONAL: NAD, frail elderly male, resting comfortably, talking without accessory mm use   HEENT: PERRLA, MMM, no LAD, no JVD appreciated  RESPIRATORY: No increased work of breathing, CTAB, no wheezes or crackles appreciated  CARDIOVASCULAR: RRR, S1 and S2 present, no m/r/g  ABDOMEN: soft, NT, ND, bowel sounds present  EXTREMITIES: LUE AVF site c/d/i without erythema or bleeding noted. Radial site c/d/i with palpable pulses, area soft. R and L BKA noted, stump site clean without erythema or pain.   MUSCULOSKELETAL: no joint swelling, no tenderness to palpation  NEURO: A&Ox1 (name), able to follow commands, move arms independently     LABS:                        10.0   7.05  )-----------( 252      ( 01 Nov 2024 03:28 )             32.1     11-01    138  |  94[L]  |  33[H]  ----------------------------<  61[L]  3.5   |  22  |  5.05[H]    Ca    8.9      01 Nov 2024 03:28  Phos  4.4     11-01  Mg     1.90     11-01    TPro  7.9  /  Alb  3.0[L]  /  TBili  0.5  /  DBili  x   /  AST  21  /  ALT  9   /  AlkPhos  90  10-31    PT/INR - ( 30 Oct 2024 22:10 )   PT: 12.8 sec;   INR: 1.08 ratio         PTT - ( 30 Oct 2024 22:10 )  PTT:41.9 sec  CARDIAC MARKERS ( 30 Oct 2024 22:10 )  x     / x     / x     / x     / 2.6 ng/mL      Urinalysis Basic - ( 01 Nov 2024 03:28 )    Color: x / Appearance: x / SG: x / pH: x  Gluc: 61 mg/dL / Ketone: x  / Bili: x / Urobili: x   Blood: x / Protein: x / Nitrite: x   Leuk Esterase: x / RBC: x / WBC x   Sq Epi: x / Non Sq Epi: x / Bacteria: x        Culture - Blood (collected 30 Oct 2024 23:10)  Source: .Blood BLOOD  Preliminary Report (01 Nov 2024 03:01):    No growth at 24 hours    Culture - Blood (collected 30 Oct 2024 22:10)  Source: .Blood BLOOD  Preliminary Report (01 Nov 2024 03:01):    No growth at 24 hours        RADIOLOGY & ADDITIONAL TESTS:  < from: Xray Chest 1 View- PORTABLE-Routine (Xray Chest 1 View- PORTABLE-Routine .) (11.01.24 @ 13:38) >  EXAM: Portable chest    FINDINGS:    Left basilar pneumothorax not significantly changed.  Left and right lungs are free of focal consolidations.  Heart size is normal.        COMPARISON: October 31        IMPRESSION: Follow-up left pneumothorax with similar findings.    --- End of Report ---    < end of copied text >    LUE Duplex: Completely thrombosed left upper extremity arteriovenous graft.      COORDINATION OF CARE:  Care Discussed with Consultants/Other Providers [Y/N]:  Prior or Outpatient Records Reviewed [Y/N]:    DISCHARGE PLANNING: pending clinical course

## 2024-11-01 NOTE — PROGRESS NOTE ADULT - ASSESSMENT
69M from [facility] with PMHx of ESRD on HD M/W/F, Anemia, HTN, PVD s/p R BKA and L AKA, multiple CVA's w/ residual L eye left gaze deviation, Vascular Dementia (AOx2 at baseline), CAD, GERD and Hyperlipidemia who is sent from NH for AMS during HD session on 10/30. Found to have moderate size L sided PTx and elevated cardiac enzymes. RRT 10/31 for hypotension that responded to IVF"s. Found to have L AVF thrombosis and now sp LUE fistulogram with thrombectomy on 11/1/24.

## 2024-11-01 NOTE — PROGRESS NOTE ADULT - ASSESSMENT
69 year old M hx of ESRD on HD left AVF MWFr, CVA w/ gliosis, BKA/AKA functional quadriplegia, CAD, HTN, HLD, sent from dialysis unit for AMS. nephrology consulted for dialysis needs    [facility]: [facility]  On HD TIW via A-VG  Schedule is MWF. Consent obtained and charted from HCP Ramonita Vincent [phone]  last hd 10/30  AVG thrombosis s/p thrombectomy by vascular 11/1  hd via AVG today  f/u duplex. and vascular  - Renal diet.    AMS  metal status seems better today  work up per team    anemia  stable  monitor    htn  acceptable  monitor    PTX  f/u CTS pulm

## 2024-11-02 LAB
ANION GAP SERPL CALC-SCNC: 21 MMOL/L — HIGH (ref 7–14)
BASOPHILS # BLD AUTO: 0.02 K/UL — SIGNIFICANT CHANGE UP (ref 0–0.2)
BASOPHILS NFR BLD AUTO: 0.3 % — SIGNIFICANT CHANGE UP (ref 0–2)
BUN SERPL-MCNC: 18 MG/DL — SIGNIFICANT CHANGE UP (ref 7–23)
CALCIUM SERPL-MCNC: 9.1 MG/DL — SIGNIFICANT CHANGE UP (ref 8.4–10.5)
CHLORIDE SERPL-SCNC: 95 MMOL/L — LOW (ref 98–107)
CO2 SERPL-SCNC: 24 MMOL/L — SIGNIFICANT CHANGE UP (ref 22–31)
CREAT SERPL-MCNC: 3.28 MG/DL — HIGH (ref 0.5–1.3)
EGFR: 19 ML/MIN/1.73M2 — LOW
EOSINOPHIL # BLD AUTO: 0.04 K/UL — SIGNIFICANT CHANGE UP (ref 0–0.5)
EOSINOPHIL NFR BLD AUTO: 0.5 % — SIGNIFICANT CHANGE UP (ref 0–6)
GLUCOSE SERPL-MCNC: 47 MG/DL — CRITICAL LOW (ref 70–99)
HCT VFR BLD CALC: 33.3 % — LOW (ref 39–50)
HGB BLD-MCNC: 10.3 G/DL — LOW (ref 13–17)
IANC: 5.86 K/UL — SIGNIFICANT CHANGE UP (ref 1.8–7.4)
IMM GRANULOCYTES NFR BLD AUTO: 0.5 % — SIGNIFICANT CHANGE UP (ref 0–0.9)
LYMPHOCYTES # BLD AUTO: 0.58 K/UL — LOW (ref 1–3.3)
LYMPHOCYTES # BLD AUTO: 7.7 % — LOW (ref 13–44)
MAGNESIUM SERPL-MCNC: 2 MG/DL — SIGNIFICANT CHANGE UP (ref 1.6–2.6)
MCHC RBC-ENTMCNC: 26.8 PG — LOW (ref 27–34)
MCHC RBC-ENTMCNC: 30.9 G/DL — LOW (ref 32–36)
MCV RBC AUTO: 86.5 FL — SIGNIFICANT CHANGE UP (ref 80–100)
MONOCYTES # BLD AUTO: 1 K/UL — HIGH (ref 0–0.9)
MONOCYTES NFR BLD AUTO: 13.3 % — SIGNIFICANT CHANGE UP (ref 2–14)
MRSA PCR RESULT.: DETECTED
NEUTROPHILS # BLD AUTO: 5.86 K/UL — SIGNIFICANT CHANGE UP (ref 1.8–7.4)
NEUTROPHILS NFR BLD AUTO: 77.7 % — HIGH (ref 43–77)
NRBC # BLD: 0 /100 WBCS — SIGNIFICANT CHANGE UP (ref 0–0)
NRBC # FLD: 0 K/UL — SIGNIFICANT CHANGE UP (ref 0–0)
PHOSPHATE SERPL-MCNC: 3.7 MG/DL — SIGNIFICANT CHANGE UP (ref 2.5–4.5)
PLATELET # BLD AUTO: 261 K/UL — SIGNIFICANT CHANGE UP (ref 150–400)
POTASSIUM SERPL-MCNC: 3.7 MMOL/L — SIGNIFICANT CHANGE UP (ref 3.5–5.3)
POTASSIUM SERPL-SCNC: 3.7 MMOL/L — SIGNIFICANT CHANGE UP (ref 3.5–5.3)
RBC # BLD: 3.85 M/UL — LOW (ref 4.2–5.8)
RBC # FLD: 17.7 % — HIGH (ref 10.3–14.5)
S AUREUS DNA NOSE QL NAA+PROBE: DETECTED
SODIUM SERPL-SCNC: 140 MMOL/L — SIGNIFICANT CHANGE UP (ref 135–145)
WBC # BLD: 7.54 K/UL — SIGNIFICANT CHANGE UP (ref 3.8–10.5)
WBC # FLD AUTO: 7.54 K/UL — SIGNIFICANT CHANGE UP (ref 3.8–10.5)

## 2024-11-02 PROCEDURE — 99233 SBSQ HOSP IP/OBS HIGH 50: CPT | Mod: GC

## 2024-11-02 PROCEDURE — 99233 SBSQ HOSP IP/OBS HIGH 50: CPT

## 2024-11-02 PROCEDURE — 71045 X-RAY EXAM CHEST 1 VIEW: CPT | Mod: 26

## 2024-11-02 RX ORDER — VANCOMYCIN HYDROCHLORIDE 50 MG/ML
1000 KIT ORAL ONCE
Refills: 0 | Status: DISCONTINUED | OUTPATIENT
Start: 2024-11-02 | End: 2024-11-02

## 2024-11-02 RX ORDER — SIMETHICONE 80 MG/1
80 TABLET, CHEWABLE ORAL THREE TIMES A DAY
Refills: 0 | Status: DISCONTINUED | OUTPATIENT
Start: 2024-11-02 | End: 2024-11-07

## 2024-11-02 RX ORDER — MUPIROCIN 20 MG/G
1 OINTMENT TOPICAL
Refills: 0 | Status: COMPLETED | OUTPATIENT
Start: 2024-11-02 | End: 2024-11-07

## 2024-11-02 RX ORDER — SENNA 187 MG
1 TABLET ORAL
Refills: 0 | Status: DISCONTINUED | OUTPATIENT
Start: 2024-11-02 | End: 2024-11-07

## 2024-11-02 RX ORDER — VANCOMYCIN HYDROCHLORIDE 50 MG/ML
750 KIT ORAL ONCE
Refills: 0 | Status: COMPLETED | OUTPATIENT
Start: 2024-11-02 | End: 2024-11-02

## 2024-11-02 RX ADMIN — Medication 650 MILLIGRAM(S): at 21:40

## 2024-11-02 RX ADMIN — HEPARIN SODIUM 5000 UNIT(S): 10000 INJECTION INTRAVENOUS; SUBCUTANEOUS at 18:36

## 2024-11-02 RX ADMIN — Medication 40 MILLIGRAM(S): at 20:44

## 2024-11-02 RX ADMIN — PIPERACILLIN AND TAZOBACTAM 25 GRAM(S): .5; 4 INJECTION, POWDER, LYOPHILIZED, FOR SOLUTION INTRAVENOUS at 05:03

## 2024-11-02 RX ADMIN — Medication 650 MILLIGRAM(S): at 15:33

## 2024-11-02 RX ADMIN — Medication 1 DROP(S): at 18:37

## 2024-11-02 RX ADMIN — VANCOMYCIN HYDROCHLORIDE 250 MILLIGRAM(S): KIT at 15:48

## 2024-11-02 RX ADMIN — HEPARIN SODIUM 5000 UNIT(S): 10000 INJECTION INTRAVENOUS; SUBCUTANEOUS at 05:04

## 2024-11-02 RX ADMIN — Medication 1 TABLET(S): at 12:48

## 2024-11-02 RX ADMIN — PIPERACILLIN AND TAZOBACTAM 25 GRAM(S): .5; 4 INJECTION, POWDER, LYOPHILIZED, FOR SOLUTION INTRAVENOUS at 18:36

## 2024-11-02 RX ADMIN — Medication 1 DROP(S): at 05:04

## 2024-11-02 RX ADMIN — Medication 1 TABLET(S): at 12:49

## 2024-11-02 RX ADMIN — Medication 650 MILLIGRAM(S): at 20:43

## 2024-11-02 RX ADMIN — PANTOPRAZOLE SODIUM 40 MILLIGRAM(S): 40 TABLET, DELAYED RELEASE ORAL at 05:03

## 2024-11-02 RX ADMIN — Medication 5 MILLIGRAM(S): at 20:43

## 2024-11-02 RX ADMIN — Medication 650 MILLIGRAM(S): at 14:33

## 2024-11-02 RX ADMIN — Medication 81 MILLIGRAM(S): at 12:49

## 2024-11-02 RX ADMIN — CHLORHEXIDINE GLUCONATE 1 APPLICATION(S): 40 SOLUTION TOPICAL at 12:48

## 2024-11-02 RX ADMIN — MUPIROCIN 1 APPLICATION(S): 20 OINTMENT TOPICAL at 18:36

## 2024-11-02 NOTE — DIETITIAN INITIAL EVALUATION ADULT - OTHER INFO
Nutrition assessment for consult as above.    69M from [facility] with PMHx of ESRD on HD M/W/F, Anemia, HTN, PVD s/p R BKA and L AKA, multiple CVA's w/ residual L eye left gaze deviation, Vascular Dementia (AOx2 at baseline), CAD, GERD and Hyperlipidemia who is sent from NH for AMS during HD session on 10/30. Found to have moderate size L sided PTx and elevated cardiac enzymes. RRT 10/31 for hypotension that responded to IVF"s. 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 L sided PTx being monitored with serial imaging under pulm's guidance.     Patient with poor observed PO intake of breakfast tray.  Patient unable to participate in RD visit due to mental status / dementia.  Chart reviewed for collateral.  No GI distress reported i.e. nausea, vomiting, diarrhea. On texture modified diet (Minced and Moist, Mod thick liquids), pending further s&s evaluation (11/1 Bedside Swallow SLP note).  Per [facility] record in paper chart, dry weight 45.3kgs; diet Rx PTA: Renal, Mechanical Soft, Regular Liquids with Gelatein Plus 4oz 2x day, LPS SF 30ml 2x day, 1000ml fluid restriction ordered.      Per [facility] weight hx:  1 month ago n/a  3 months ago Aug 2024 - 46.2kg  6 months ago n/a  1 year ago n/a     Inpatient weights: (11/1) 46.4kg post HD; Adm/dosing 45.4kg     Adjusted BMI for BKA and AKA (pre-amputation height 66 inches)=19.5kg/m2  IBW 64.5kgs - 16% for BKA and AKA--->Adjusted BW for BKA and AKA: 54.2kg

## 2024-11-02 NOTE — PROGRESS NOTE ADULT - ASSESSMENT
69M w hx vascular dementia (A&Ox2 at baseline), CVAs w residual deficits (L eye deviation), CAD, HTN, HLD, anemia, PAD s/p L fem-BK pop bypass and jump graft from distal anastomosis to left PT with vein 3/29/18, later popliteal ligation 5/3/18, and ultimately R BKA and L AKA at an OSH, ESRD M/W/F via LUE AVG also created at OSH with stents noted on CXR, admitted for AMS during HD 10/30. Vascular surgery consulted for poor thrill/bruit after RRT for hypotension, concern for thrombosed AVG. Patient also with notable elevated troponin and ST changes, L PTX, and possible newly worsened LV dysfunction. AVG with no thrill. Now s/p LUE AVG thrombectomy on 11/1.    Recs:  - HD via AVG 11/1 - successful  - Recovering well  - Vascular will sign off at this time, please reconsult if needed  - Remainder of care per primary appreciated     Vascular Surgery  [id number]

## 2024-11-02 NOTE — DIETITIAN INITIAL EVALUATION ADULT - PROBLEM SELECTOR PLAN 3
CT Chest: Small right effusion and trace left effusion. Moderate left pneumothorax. No mediastinal shift  - Consulted CTS, apprec recs  - Pt w/ multiple Thoracentesis on Left side in past. Possible hydroptx from recurrent effusions  - If patient is requiring PTC, recommend IR consult  - Will f/up with Pulmonary regarding need to place chest tube  - On NRM, trial of NC 6L min, wean off O2 as tolerated  - Continuous pulse-oximetry

## 2024-11-02 NOTE — DIETITIAN NUTRITION RISK NOTIFICATION - TREATMENT: THE FOLLOWING DIET HAS BEEN RECOMMENDED
Diet, Regular:   Minced and Moist (MINCEDMOIST)  Moderately Thick Liquids (MODTHICKLIQS)  For patients receiving Renal Replacement - No Protein Restr, No Conc K, No Conc Phos, Low Sodium (RENAL) (10-31-24 @ 15:34) [Active]

## 2024-11-02 NOTE — PROGRESS NOTE ADULT - ASSESSMENT
69 year old M hx of ESRD on HD left AVF MWFr, CVA w/ gliosis, BKA/AKA functional quadriplegia, CAD, HTN, HLD, sent from dialysis unit for AMS. nephrology consulted for dialysis needs    [facility]: [facility]  On HD TIW via A-VG  Schedule is MWF. Consent obtained and charted from HCP Ramonita Vincent [phone]  last hd 10/30  AVG thrombosis s/p thrombectomy by vascular 11/1  hd via AVG 11/1 without issue  hd monday  - Renal diet.    AMS  metal status seems better today  work up per team    anemia  stable  monitor    htn  acceptable  monitor    PTX  f/u CTS pulm

## 2024-11-02 NOTE — PROGRESS NOTE ADULT - SUBJECTIVE AND OBJECTIVE BOX
Interval Events:  - Stable on room air   - CXR without enlargement of pneumothorax therefore low suspicion for true pneumothorax     REVIEW OF SYSTEMS:  Negative except as documented above.      OBJECTIVE:  ICU Vital Signs Last 24 Hrs  T(C): 36.6 (02 Nov 2024 12:50), Max: 36.7 (02 Nov 2024 05:00)  T(F): 97.9 (02 Nov 2024 12:50), Max: 98 (02 Nov 2024 05:00)  HR: 92 (02 Nov 2024 12:50) (90 - 102)  BP: 105/50 (02 Nov 2024 12:50) (94/44 - 122/61)  BP(mean): --  ABP: --  ABP(mean): --  RR: 17 (02 Nov 2024 12:50) (17 - 18)  SpO2: 97% (02 Nov 2024 12:50) (97% - 100%)    O2 Parameters below as of 02 Nov 2024 12:50  Patient On (Oxygen Delivery Method): room air              11-01 @ 07:01 - 11-02 @ 07:00  --------------------------------------------------------  IN: 400 mL / OUT: 1100 mL / NET: -700 mL    11-02 @ 08:01  -  11-02 @ 18:18  --------------------------------------------------------  IN: 100 mL / OUT: 0 mL / NET: 100 mL      CAPILLARY BLOOD GLUCOSE      POCT Blood Glucose.: 84 mg/dL (02 Nov 2024 12:25)      PHYSICAL EXAM:  General: NAD, resting comfortably   HEENT:  Sclera anicteric  Cardiovascular: RRR  Respiratory: CTAB, normal respiratory effort   Abdomen: soft, nontender  Extremities: warm and well perfused  Skin: no rashes      HOSPITAL MEDICATIONS:  MEDICATIONS  (STANDING):  aspirin  chewable 81 milliGRAM(s) Oral daily  atorvastatin 40 milliGRAM(s) Oral at bedtime  bisacodyl 5 milliGRAM(s) Oral at bedtime  chlorhexidine 2% Cloths 1 Application(s) Topical daily  heparin   Injectable 5000 Unit(s) SubCutaneous every 12 hours  lactobacillus acidophilus 1 Tablet(s) Oral daily  midodrine. 7.5 milliGRAM(s) Oral <User Schedule>  multivitamin 1 Tablet(s) Oral daily  mupirocin 2% Ointment 1 Application(s) Topical two times a day  pantoprazole    Tablet 40 milliGRAM(s) Oral before breakfast  piperacillin/tazobactam IVPB.. 3.375 Gram(s) IV Intermittent every 12 hours  prednisoLONE acetate 1% Suspension 1 Drop(s) Both EYES two times a day  senna 1 Tablet(s) Oral <User Schedule>    MEDICATIONS  (PRN):  acetaminophen     Tablet .. 650 milliGRAM(s) Oral every 6 hours PRN Temp greater or equal to 38C (100.4F), Mild Pain (1 - 3)  simethicone 80 milliGRAM(s) Chew three times a day PRN Gas      LABS:                        10.3   7.54  )-----------( 261      ( 02 Nov 2024 05:22 )             33.3     Hgb Trend: 10.3<--, 10.0<--, 10.3<--, 10.4<--  11-02    140  |  95[L]  |  18  ----------------------------<  47[LL]  3.7   |  24  |  3.28[H]    Ca    9.1      02 Nov 2024 05:22  Phos  3.7     11-02  Mg     2.00     11-02      Creatinine Trend: 3.28<--, 5.05<--, 3.94<--, 3.46<--    Urinalysis Basic - ( 02 Nov 2024 05:22 )    Color: x / Appearance: x / SG: x / pH: x  Gluc: 47 mg/dL / Ketone: x  / Bili: x / Urobili: x   Blood: x / Protein: x / Nitrite: x   Leuk Esterase: x / RBC: x / WBC x   Sq Epi: x / Non Sq Epi: x / Bacteria: x            MICROBIOLOGY:     Culture - Blood (collected 30 Oct 2024 23:10)  Source: .Blood BLOOD  Preliminary Report (02 Nov 2024 03:01):    No growth at 48 Hours    Culture - Blood (collected 30 Oct 2024 22:10)  Source: .Blood BLOOD  Preliminary Report (02 Nov 2024 03:01):    No growth at 48 Hours        RADIOLOGY:  [x] Reviewed and interpreted by me

## 2024-11-02 NOTE — DIETITIAN INITIAL EVALUATION ADULT - FACTORS AFF FOOD INTAKE
change in mental status/difficulty feeding self/difficulty swallowing/difficulty with food procurement/preparation

## 2024-11-02 NOTE — PROGRESS NOTE ADULT - ASSESSMENT
69M from [facility] with PMHx of ESRD on HD M/W/F, Anemia, HTN, PVD s/p R BKA and L AKA, multiple CVA's w/ residual L eye left gaze deviation, Vascular Dementia (AOx2 at baseline), CAD, GERD and Hyperlipidemia who is sent from NH for AMS during HD session on 10/30. Found to have moderate size L sided PTx and elevated cardiac enzymes. RRT 10/31 for hypotension that responded to IVF"s. 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 L sided PTx being monitored with serial imaging under pulm's guidance.

## 2024-11-02 NOTE — DIETITIAN INITIAL EVALUATION ADULT - PERTINENT MEDS FT
MEDICATIONS  (STANDING):  aspirin  chewable 81 milliGRAM(s) Oral daily  atorvastatin 40 milliGRAM(s) Oral at bedtime  bisacodyl 5 milliGRAM(s) Oral at bedtime  chlorhexidine 2% Cloths 1 Application(s) Topical daily  heparin   Injectable 5000 Unit(s) SubCutaneous every 12 hours  lactobacillus acidophilus 1 Tablet(s) Oral daily  midodrine. 7.5 milliGRAM(s) Oral <User Schedule>  multivitamin 1 Tablet(s) Oral daily  pantoprazole    Tablet 40 milliGRAM(s) Oral before breakfast  piperacillin/tazobactam IVPB.. 3.375 Gram(s) IV Intermittent every 12 hours  prednisoLONE acetate 1% Suspension 1 Drop(s) Both EYES two times a day  senna 1 Tablet(s) Oral <User Schedule>    MEDICATIONS  (PRN):  acetaminophen     Tablet .. 650 milliGRAM(s) Oral every 6 hours PRN Temp greater or equal to 38C (100.4F), Mild Pain (1 - 3)  simethicone 80 milliGRAM(s) Chew three times a day PRN Gas

## 2024-11-02 NOTE — DIETITIAN INITIAL EVALUATION ADULT - DIET TYPE
Defer diet and liquid consistency provisions to team  / physician / SLP discretion; consider diet liberalization i.e. d/c RENAL diet restrictions in view of poor PO intake.

## 2024-11-02 NOTE — SWALLOW BEDSIDE ASSESSMENT ADULT - COMMENTS
Adult Pulmonology Resident 11/1: "69 year old man, [facility] resident,  with hx/p CAD, CM HFrEF EF 35-40% (TTE 7/2024), PAD with R BKA and L AKA, functionally quadriplegic, CVA (residual: L eye gaze deviation), HTN, HLD, Dementia, ESRD on HD (MWF via L AVF) presented to ED after becoming unresponsive during HD. In the ED he was initially HDS on RA. Noted to have elevated troponin and ECG SR with REJI in V3. Initial CXR demonstrated 3cm left basilar pneumothorax. CT chest moderate left PTX. No mediastinal shift. Overnight, RRT called for hypotension and hypoxia. BP 70s/50s improved with  cc bolus and SPO2 100% on NRB. Repeat CXR demonstrating unchanged basilar PTX. Pt with hx/o multiple L sided thoracentesis. Pulmonology was consulted for further evaluation of this basilar PTX. Bedside POCUS demonstrated basilar and lateral PTX. Lateral lung point. Lung pulse noted anteriorly. Possible bronchopleural fistula noted in the loculated space on CT causing PTX"    NPOpMidnight diet order noted, call placed to ACP for clearance for PO trials; however pt currently off floor for procedure. Service to follow up tomorrow for bedside swallow assessment. ACP Erika velasquez.
Adult Hospitalist 11/2: "69M from [facility] with PMHx of ESRD on HD M/W/F, Anemia, HTN, PVD s/p R BKA and L AKA, multiple CVA's w/ residual L eye left gaze deviation, Vascular Dementia (AOx2 at baseline), CAD, GERD and Hyperlipidemia who is sent from NH for AMS during HD session on 10/30. Found to have moderate size L sided PTx and elevated cardiac enzymes. RRT 10/31 for hypotension that responded to IVF"s. 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 L sided PTx being monitored with serial imaging under pulm's guidance."    CXR 11/1: FINDINGS: Left basilar pneumothorax not significantly changed. Left and right lungs are free of focal consolidations. Heart size is normal.     Of Note: Pt known to this service from prior admissions, seen most recently for bedside swallow evaluation 7/31/24 with rec for Minced/Moist & Mildly Thick Liquids (See notes for details).    Patient received awake, alert, confused, calling out, RN at bedside. Patient receptive to PO trials with encouragement, though unable to answer questions related to diet.

## 2024-11-02 NOTE — SWALLOW BEDSIDE ASSESSMENT ADULT - ORAL PHASE
Decreased anterior-posterior movement of the bolus clears with liquid wash/Decreased anterior-posterior movement of the bolus/Lingual stasis

## 2024-11-02 NOTE — SWALLOW BEDSIDE ASSESSMENT ADULT - SWALLOW EVAL: DIAGNOSIS
Moderate oral dysphagia for puree, minced moist, moderately thick, mildly thick, and thin liquids marked by reduced retrieval/reduced labial seal around cup, slow bolus manipulation for minced/moist, slow anterior posterior bolus transport, and mild lingual residue for minced/moist which required liquid wash to achieve oral clearance. Functional pharyngeal phase characterized by initiation of swallow + hyolaryngeal excursion upon palpation with no overt clinical s/s airway penetration/aspiration for puree, minced moist, moderately thick, & mildly thick liquids. Moderate pharyngeal dysphagia for thin liquid marked by suspected delay of swallow initiation +hyolaryngeal excursion and delayed throat clear post-swallow.

## 2024-11-02 NOTE — PROGRESS NOTE ADULT - SUBJECTIVE AND OBJECTIVE BOX
[facility] [place] [facility]OLOGY [facility]ACT[facility]   MD MARIBELL CARRION MD ANGELA WONG, PA    TEL:  OFFICE: [phone]  From 5pm-7am Answering Service [phone]    -- RENAL FOLLOW UP NOTE ---Date of Service 11-02-24 @ 13:53    Patient is a 69y old  Male who presents with a chief complaint of Altered mental status     (02 Nov 2024 10:19)      Patient seen and examined at bedside. No chest pain/sob    VITALS:  T(F): 98 (11-02-24 @ 05:00), Max: 98 (11-01-24 @ 16:00)  HR: 102 (11-02-24 @ 05:00)  BP: 112/55 (11-02-24 @ 05:00)  RR: 17 (11-02-24 @ 05:00)  SpO2: 99% (11-02-24 @ 05:00)  Wt(kg): --    11-01 @ 07:01  -  11-02 @ 07:00  --------------------------------------------------------  IN: 400 mL / OUT: 1100 mL / NET: -700 mL    11-02 @ 08:01  -  11-02 @ 13:53  --------------------------------------------------------  IN: 50 mL / OUT: 0 mL / NET: 50 mL          PHYSICAL EXAM:  General: NAD  Neck: No JVD  Respiratory: CTAB, no wheezes, rales or rhonchi  Cardiovascular: S1, S2, RRR  Gastrointestinal: BS+, soft, NT/ND  Extremities: b/l amputation    Hospital Medications:   MEDICATIONS  (STANDING):  aspirin  chewable 81 milliGRAM(s) Oral daily  atorvastatin 40 milliGRAM(s) Oral at bedtime  bisacodyl 5 milliGRAM(s) Oral at bedtime  chlorhexidine 2% Cloths 1 Application(s) Topical daily  heparin   Injectable 5000 Unit(s) SubCutaneous every 12 hours  lactobacillus acidophilus 1 Tablet(s) Oral daily  midodrine. 7.5 milliGRAM(s) Oral <User Schedule>  multivitamin 1 Tablet(s) Oral daily  mupirocin 2% Ointment 1 Application(s) Topical two times a day  pantoprazole    Tablet 40 milliGRAM(s) Oral before breakfast  piperacillin/tazobactam IVPB.. 3.375 Gram(s) IV Intermittent every 12 hours  prednisoLONE acetate 1% Suspension 1 Drop(s) Both EYES two times a day  senna 1 Tablet(s) Oral <User Schedule>  vancomycin  IVPB 750 milliGRAM(s) IV Intermittent once      LABS:  11-02    140  |  95[L]  |  18  ----------------------------<  47[LL]  3.7   |  24  |  3.28[H]    Ca    9.1      02 Nov 2024 05:22  Phos  3.7     11-02  Mg     2.00     11-02      Creatinine Trend: 3.28 <--, 5.05 <--, 3.94 <--, 3.46 <--    Phosphorus: 3.7 mg/dL (11-02 @ 05:22)                              10.3   7.54  )-----------( 261      ( 02 Nov 2024 05:22 )             33.3     Urine Studies:  Urinalysis - [11-02-24 @ 05:22]      Color  / Appearance  / SG  / pH       Gluc 47 / Ketone   / Bili  / Urobili        Blood  / Protein  / Leuk Est  / Nitrite       RBC  / WBC  / Hyaline  / Gran  / Sq Epi  / Non Sq Epi  / Bacteria       Iron 23, TIBC 116, %sat 20      [11-01-24 @ 03:28]  Ferritin 1635      [11-01-24 @ 03:28]  PTH -- (Ca --)      [04-28-24 @ 05:00]   155  TSH 1.45      [07-31-24 @ 07:45]  Lipid: chol 90, TG 81, HDL 39, LDL --      [11-01-24 @ 03:28]        RADIOLOGY & ADDITIONAL STUDIES:

## 2024-11-02 NOTE — DIETITIAN INITIAL EVALUATION ADULT - NS FNS DIET ORDER
Diet, Regular:   Minced and Moist (MINCEDMOIST)  Moderately Thick Liquids (MODTHICKLIQS)  For patients receiving Renal Replacement - No Protein Restr, No Conc K, No Conc Phos, Low Sodium (RENAL) (10-31-24 @ 15:34)

## 2024-11-02 NOTE — PROGRESS NOTE ADULT - SUBJECTIVE AND OBJECTIVE BOX
INTERVAL EVENTS: LUE AVG thrombectomy on 11/1. AVG used during HD 11/1  SUBJECTIVE: Patient seen and examined at bedside with surgical team, patient without complaints. Denies fever, chills, CP, SOB nausea, vomiting, abdominal pain.    OBJECTIVE:    Vital Signs Last 24 Hrs  T(C): 36.7 (02 Nov 2024 05:00), Max: 36.7 (01 Nov 2024 16:00)  T(F): 98 (02 Nov 2024 05:00), Max: 98 (01 Nov 2024 16:00)  HR: 102 (02 Nov 2024 05:00) (65 - 102)  BP: 112/55 (02 Nov 2024 05:00) (111/60 - 134/67)  BP(mean): --  RR: 17 (02 Nov 2024 05:00) (13 - 20)  SpO2: 99% (02 Nov 2024 05:00) (99% - 100%)    Parameters below as of 02 Nov 2024 05:00  Patient On (Oxygen Delivery Method): room air    I&O's Detail    01 Nov 2024 07:01  -  02 Nov 2024 07:00  --------------------------------------------------------  IN:    Other (mL): 400 mL  Total IN: 400 mL    OUT:    Other (mL): 1100 mL  Total OUT: 1100 mL    Total NET: -700 mL      MEDICATIONS  (STANDING):  aspirin  chewable 81 milliGRAM(s) Oral daily  atorvastatin 40 milliGRAM(s) Oral at bedtime  bisacodyl 5 milliGRAM(s) Oral at bedtime  chlorhexidine 2% Cloths 1 Application(s) Topical daily  heparin   Injectable 5000 Unit(s) SubCutaneous every 12 hours  lactobacillus acidophilus 1 Tablet(s) Oral daily  midodrine. 7.5 milliGRAM(s) Oral <User Schedule>  multivitamin 1 Tablet(s) Oral daily  pantoprazole    Tablet 40 milliGRAM(s) Oral before breakfast  piperacillin/tazobactam IVPB.. 3.375 Gram(s) IV Intermittent every 12 hours  prednisoLONE acetate 1% Suspension 1 Drop(s) Both EYES two times a day  senna 1 Tablet(s) Oral <User Schedule>    MEDICATIONS  (PRN):  acetaminophen     Tablet .. 650 milliGRAM(s) Oral every 6 hours PRN Temp greater or equal to 38C (100.4F), Mild Pain (1 - 3)  simethicone 80 milliGRAM(s) Chew three times a day PRN Gas      PHYSICAL EXAM:  Constitutional: Well developed, NAD, follows commands  Pulmonary: Symmetric chest rise bilaterally, no increased WOB  Extremities: LUE with AVG palpable but no thrill, sensation intact to light touch throughout LUE, hand  intact, LUE arm flexion and extension intact, site wrapped with pressure tape, since then no active bleeding, no hematoma    LABS:                        10.3   7.54  )-----------( 261      ( 02 Nov 2024 05:22 )             33.3     11-02    140  |  95[L]  |  18  ----------------------------<  47[LL]  3.7   |  24  |  3.28[H]    Ca    9.1      02 Nov 2024 05:22  Phos  3.7     11-02  Mg     2.00     11-02    TPro  7.9  /  Alb  3.0[L]  /  TBili  0.5  /  DBili  x   /  AST  21  /  ALT  9   /  AlkPhos  90  10-31      LIVER FUNCTIONS - ( 31 Oct 2024 11:58 )  Alb: 3.0 g/dL / Pro: 7.9 g/dL / ALK PHOS: 90 U/L / ALT: 9 U/L / AST: 21 U/L / GGT: x           Urinalysis Basic - ( 02 Nov 2024 05:22 )    Color: x / Appearance: x / SG: x / pH: x  Gluc: 47 mg/dL / Ketone: x  / Bili: x / Urobili: x   Blood: x / Protein: x / Nitrite: x   Leuk Esterase: x / RBC: x / WBC x   Sq Epi: x / Non Sq Epi: x / Bacteria: x

## 2024-11-02 NOTE — DIETITIAN INITIAL EVALUATION ADULT - PERTINENT LABORATORY DATA
11-02    140  |  95[L]  |  18  ----------------------------<  47[LL]  3.7   |  24  |  3.28[H]    Ca    9.1      02 Nov 2024 05:22  Phos  3.7     11-02  Mg     2.00     11-02    TPro  7.9  /  Alb  3.0[L]  /  TBili  0.5  /  DBili  x   /  AST  21  /  ALT  9   /  AlkPhos  90  10-31    A1C with Estimated Average Glucose Result: 4.7 % (11-01-24 @ 03:28)  A1C with Estimated Average Glucose Result: 4.8 % (05-02-24 @ 06:15)    CAPILLARY BLOOD GLUCOSE    POCT Blood Glucose.: 75 mg/dL (02 Nov 2024 08:16)  POCT Blood Glucose.: 74 mg/dL (02 Nov 2024 07:49)  POCT Blood Glucose.: 68 mg/dL (02 Nov 2024 07:48)

## 2024-11-02 NOTE — SWALLOW BEDSIDE ASSESSMENT ADULT - ASR SWALLOW RECOMMEND DIAG
Objective testing not warranted given no overt clinical s/s impaired airway protection at bedside with recommended consistencies and chest imaging without concern for pneumonia

## 2024-11-02 NOTE — DIETITIAN INITIAL EVALUATION ADULT - PROBLEM SELECTOR PLAN 10
Heparin SQ  PT evaluation  FULL CODE per GO    Discussed/updated Ramonita Razo ([facility]) on 10/31.

## 2024-11-02 NOTE — DIETITIAN INITIAL EVALUATION ADULT - PROBLEM SELECTOR PLAN 2
Likely in the setting of PTX and possible aspiration Pneumonia  - CT Chest: Small right effusion and trace left effusion. Moderate left pneumothorax. No mediastinal shift. Indeterminate consolidation in the left lower lobe possibly infection and/or aspiration  - RRT 10/31 for hypotension/tachypnea, placed on NRM, s/p 500cc bolus w/ improvement, apprec assistance  - On NRM, trial of NC 6L min, wean off O2 as tolerated,   - Pulmonary to evaluate patient, f/up recs  - Will c/w zosyn for now for possible aspiration pneumonia, total of 5 days thru 11/5  - Pending dysphagia screen prior to ordering a diet  (was on mechanical soft/thin liquids at NH)  - F/up formal S&S evaluation  - Aspiration precautions  - Low threshold to consult MICU, Full Code per GOC note above  - Consider Palliative Consultation pending clinical progress

## 2024-11-02 NOTE — PROGRESS NOTE ADULT - SUBJECTIVE AND OBJECTIVE BOX
[facility] Department of Hospital Medicine  Ariel Alcantar DO  Available on MS Teams  Pager: [phone]    Patient is a 69y old  Male who presents with a chief complaint of AMS (01 Nov 2024 17:57)      OVERNIGHT EVENTS: No acute events overnight. +BM overnight.     SUBJECTIVE: Pt seen and examined at bedside this morning. AAOx1 (name), mental status continuing to improve, follows commands, knows he lives in [place] and with sister and brother and name of niece. HD yesterday via AVF graft. No further testing / studies per d/w vascular surgery team for the pt. Tele, vitals, and labs reviewed. slight desaturation overnight with tachycardia and improves with being seated up. on RA. no reporting any cp, sob, palpitations, dizziness. some abd pain/cramps suspect from bowel regimen.     ADDITIONAL REVIEW OF SYSTEMS:    MEDICATIONS  (STANDING):  aspirin  chewable 81 milliGRAM(s) Oral daily  atorvastatin 40 milliGRAM(s) Oral at bedtime  bisacodyl 5 milliGRAM(s) Oral at bedtime  chlorhexidine 2% Cloths 1 Application(s) Topical daily  heparin   Injectable 5000 Unit(s) SubCutaneous every 12 hours  lactobacillus acidophilus 1 Tablet(s) Oral daily  midodrine. 7.5 milliGRAM(s) Oral <User Schedule>  multivitamin 1 Tablet(s) Oral daily  pantoprazole    Tablet 40 milliGRAM(s) Oral before breakfast  piperacillin/tazobactam IVPB.. 3.375 Gram(s) IV Intermittent every 12 hours  prednisoLONE acetate 1% Suspension 1 Drop(s) Both EYES two times a day  senna 1 Tablet(s) Oral <User Schedule>  sorbitol 70%/mineral oil/magnesium hydroxide/glycerin Enema 120 milliLiter(s) Rectal once    MEDICATIONS  (PRN):  acetaminophen     Tablet .. 650 milliGRAM(s) Oral every 6 hours PRN Temp greater or equal to 38C (100.4F), Mild Pain (1 - 3)  simethicone 80 milliGRAM(s) Chew three times a day PRN Gas      CAPILLARY BLOOD GLUCOSE      POCT Blood Glucose.: 75 mg/dL (02 Nov 2024 08:16)  POCT Blood Glucose.: 74 mg/dL (02 Nov 2024 07:49)  POCT Blood Glucose.: 68 mg/dL (02 Nov 2024 07:48)    I&O's Summary    01 Nov 2024 07:01  -  02 Nov 2024 07:00  --------------------------------------------------------  IN: 400 mL / OUT: 1100 mL / NET: -700 mL        PHYSICAL EXAM:  Vital Signs Last 24 Hrs  T(C): 36.7 (02 Nov 2024 05:00), Max: 36.7 (01 Nov 2024 16:00)  T(F): 98 (02 Nov 2024 05:00), Max: 98 (01 Nov 2024 16:00)  HR: 102 (02 Nov 2024 05:00) (65 - 102)  BP: 112/55 (02 Nov 2024 05:00) (111/60 - 134/67)  BP(mean): --  RR: 17 (02 Nov 2024 05:00) (13 - 20)  SpO2: 99% (02 Nov 2024 05:00) (99% - 100%)    Parameters below as of 02 Nov 2024 05:00  Patient On (Oxygen Delivery Method): room air      CONSTITUTIONAL: NAD, frail elderly male, resting comfortably, talking without accessory mm use   HEENT: PERRLA, dry MMM, no LAD, no JVD appreciated  RESPIRATORY: No increased work of breathing, CTAB, no wheezes or crackles appreciated  CARDIOVASCULAR: RRR, S1 and S2 present, no m/r/g  ABDOMEN: soft, NT, ND, bowel sounds present  EXTREMITIES: LUE AVF site c/d/i without erythema or bleeding, +thrill noted. Radial site c/d/i with palpable pulses, area soft. R and L BKA noted, stump site clean without erythema or pain.   NEURO: A&Ox1 (name), able to follow commands, move arms independently, UE strength intact and antigravity.     LABS:                        10.3   7.54  )-----------( 261      ( 02 Nov 2024 05:22 )             33.3     11-02    140  |  95[L]  |  18  ----------------------------<  47[LL]  3.7   |  24  |  3.28[H]    Ca    9.1      02 Nov 2024 05:22  Phos  3.7     11-02  Mg     2.00     11-02    TPro  7.9  /  Alb  3.0[L]  /  TBili  0.5  /  DBili  x   /  AST  21  /  ALT  9   /  AlkPhos  90  10-31          Urinalysis Basic - ( 02 Nov 2024 05:22 )    Color: x / Appearance: x / SG: x / pH: x  Gluc: 47 mg/dL / Ketone: x  / Bili: x / Urobili: x   Blood: x / Protein: x / Nitrite: x   Leuk Esterase: x / RBC: x / WBC x   Sq Epi: x / Non Sq Epi: x / Bacteria: x        Culture - Blood (collected 30 Oct 2024 23:10)  Source: .Blood BLOOD  Preliminary Report (02 Nov 2024 03:01):    No growth at 48 Hours    Culture - Blood (collected 30 Oct 2024 22:10)  Source: .Blood BLOOD  Preliminary Report (02 Nov 2024 03:01):    No growth at 48 Hours        RADIOLOGY & ADDITIONAL TESTS:    Results Reviewed:   Imaging Personally Reviewed:  Electrocardiogram Personally Reviewed:    COORDINATION OF CARE:  Care Discussed with Consultants/Other Providers [Y/N]:  Prior or Outpatient Records Reviewed [Y/N]:

## 2024-11-02 NOTE — PROVIDER CONTACT NOTE (OTHER) - ASSESSMENT
Pt complains of 8/10 belly pain. Clenching stomach and grinding teeth. Denies chest pain, SOB, headache, lightheadedness.

## 2024-11-02 NOTE — DIETITIAN INITIAL EVALUATION ADULT - ADD RECOMMEND
1) Monitor weights, PO intake/diet tolerance, skin integrity, pertinent labs.   2) Please consistently document % PO intake in nursing flowsheets to assess adequacy of nutritional intake/monitor need for further nutritional intervention(s).   3) Consideration for alternate means of nutrition if PO intake remains consistently poor if within goals of care.

## 2024-11-02 NOTE — PROGRESS NOTE ADULT - PROBLEM SELECTOR PLAN 4
- US duplex from 11/1/24 notable for: Completely thrombosed left upper extremity arteriovenous graft.  - sp LUE fistulogram and thrombectomy + stent on 11/1/24  - monitor site for any bleeding/hematoma  - active t/s, trend H/H  - HD via AVF per nephro  - will f/u with vascular team to discuss about need for DAPT vs SAPT vs anticoagulation for AV graft stent; f/u recs - US duplex from 11/1/24 notable for: Completely thrombosed left upper extremity arteriovenous graft.  - sp LUE fistulogram and thrombectomy + stent on 11/1/24  - monitor site for any bleeding/hematoma  - active t/s, trend H/H  - HD via AVF per nephro  - discussed with vascular team about additional antiplatelet / anticoagulation given AV graft stent and recommended to c/w aspirin 81mg as pt takes at home.

## 2024-11-02 NOTE — PROGRESS NOTE ADULT - ASSESSMENT
69 year old man, [facility] resident,  with hx/p CAD, CM HFrEF EF 35-40% (TTE 7/2024), PAD with R BKA and L AKA, functionally quadriplegic, CVA (residual: L eye gaze deviation), HTN, HLD, Dementia, ESRD on HD (MWF via L AVF) presented to ED after becoming unresponsive during HD. In the ED he was initially HDS on RA. Noted to have elevated troponin and ECG SR with REJI in V3. Initial CXR demonstrated 3cm left basilar pneumothorax. CT chest moderate left PTX. No mediastinal shift. Overnight, RRT called for hypotension and hypoxia. BP 70s/50s improved with  cc bolus and SPO2 100% on NRB. Repeat CXR demonstrating unchanged basilar PTX. Pt with hx/o multiple L sided thoracentesis. Pulmonology was consulted for further evaluation of this basilar PTX. Bedside POCUS demonstrated basilar and lateral PTX. Lateral lung point. Lung pulse noted anteriorly. Possible bronchopleural fistula noted in the loculated space on CT causing PTX    #dyspnea  #hypoxemia  #hydropneumothorax    Recommendations:  - currently on RA, continue to monitor  - patient with dark brown/yellow-color sputum and oral secretions, reasonable to obtain sputum culture and start abx. Pt with known hx/o MRSA  - no acute intervention warranted, lack of change in pneumothorax indicates this is more consistent with trapped lung than true pneumothorax  - serial chest x-rays to assess PTX  - no pulmonary contraindcations to surgery, patient is optimized from a pulmonary perspective

## 2024-11-03 LAB
ANION GAP SERPL CALC-SCNC: 22 MMOL/L — HIGH (ref 7–14)
BASOPHILS # BLD AUTO: 0.03 K/UL — SIGNIFICANT CHANGE UP (ref 0–0.2)
BASOPHILS NFR BLD AUTO: 0.5 % — SIGNIFICANT CHANGE UP (ref 0–2)
BUN SERPL-MCNC: 32 MG/DL — HIGH (ref 7–23)
CALCIUM SERPL-MCNC: 9.2 MG/DL — SIGNIFICANT CHANGE UP (ref 8.4–10.5)
CHLORIDE SERPL-SCNC: 93 MMOL/L — LOW (ref 98–107)
CO2 SERPL-SCNC: 22 MMOL/L — SIGNIFICANT CHANGE UP (ref 22–31)
CREAT SERPL-MCNC: 4.66 MG/DL — HIGH (ref 0.5–1.3)
CRP SERPL-MCNC: 324.3 MG/L — HIGH
EGFR: 13 ML/MIN/1.73M2 — LOW
EOSINOPHIL # BLD AUTO: 0.16 K/UL — SIGNIFICANT CHANGE UP (ref 0–0.5)
EOSINOPHIL NFR BLD AUTO: 2.4 % — SIGNIFICANT CHANGE UP (ref 0–6)
GLUCOSE SERPL-MCNC: 75 MG/DL — SIGNIFICANT CHANGE UP (ref 70–99)
HCT VFR BLD CALC: 34.1 % — LOW (ref 39–50)
HGB BLD-MCNC: 10.5 G/DL — LOW (ref 13–17)
IANC: 4.88 K/UL — SIGNIFICANT CHANGE UP (ref 1.8–7.4)
IMM GRANULOCYTES NFR BLD AUTO: 0.5 % — SIGNIFICANT CHANGE UP (ref 0–0.9)
LYMPHOCYTES # BLD AUTO: 0.75 K/UL — LOW (ref 1–3.3)
LYMPHOCYTES # BLD AUTO: 11.4 % — LOW (ref 13–44)
MAGNESIUM SERPL-MCNC: 2.1 MG/DL — SIGNIFICANT CHANGE UP (ref 1.6–2.6)
MCHC RBC-ENTMCNC: 26.6 PG — LOW (ref 27–34)
MCHC RBC-ENTMCNC: 30.8 G/DL — LOW (ref 32–36)
MCV RBC AUTO: 86.5 FL — SIGNIFICANT CHANGE UP (ref 80–100)
MONOCYTES # BLD AUTO: 0.74 K/UL — SIGNIFICANT CHANGE UP (ref 0–0.9)
MONOCYTES NFR BLD AUTO: 11.2 % — SIGNIFICANT CHANGE UP (ref 2–14)
NEUTROPHILS # BLD AUTO: 4.88 K/UL — SIGNIFICANT CHANGE UP (ref 1.8–7.4)
NEUTROPHILS NFR BLD AUTO: 74 % — SIGNIFICANT CHANGE UP (ref 43–77)
NRBC # BLD: 0 /100 WBCS — SIGNIFICANT CHANGE UP (ref 0–0)
NRBC # FLD: 0 K/UL — SIGNIFICANT CHANGE UP (ref 0–0)
PHOSPHATE SERPL-MCNC: 4.7 MG/DL — HIGH (ref 2.5–4.5)
PLATELET # BLD AUTO: 301 K/UL — SIGNIFICANT CHANGE UP (ref 150–400)
POTASSIUM SERPL-MCNC: 4.1 MMOL/L — SIGNIFICANT CHANGE UP (ref 3.5–5.3)
POTASSIUM SERPL-SCNC: 4.1 MMOL/L — SIGNIFICANT CHANGE UP (ref 3.5–5.3)
PROCALCITONIN SERPL-MCNC: 3.99 NG/ML — HIGH (ref 0.02–0.1)
RBC # BLD: 3.94 M/UL — LOW (ref 4.2–5.8)
RBC # FLD: 18 % — HIGH (ref 10.3–14.5)
SODIUM SERPL-SCNC: 137 MMOL/L — SIGNIFICANT CHANGE UP (ref 135–145)
WBC # BLD: 6.59 K/UL — SIGNIFICANT CHANGE UP (ref 3.8–10.5)
WBC # FLD AUTO: 6.59 K/UL — SIGNIFICANT CHANGE UP (ref 3.8–10.5)

## 2024-11-03 PROCEDURE — 71045 X-RAY EXAM CHEST 1 VIEW: CPT | Mod: 26

## 2024-11-03 PROCEDURE — 74018 RADEX ABDOMEN 1 VIEW: CPT | Mod: 26

## 2024-11-03 PROCEDURE — 99233 SBSQ HOSP IP/OBS HIGH 50: CPT

## 2024-11-03 RX ORDER — DOXYCYCLINE HYCLATE 100 MG/1
100 TABLET, FILM COATED ORAL EVERY 12 HOURS
Refills: 0 | Status: DISCONTINUED | OUTPATIENT
Start: 2024-11-03 | End: 2024-11-06

## 2024-11-03 RX ORDER — PETROLATUM 987.89 MG/G
1 OINTMENT TOPICAL
Refills: 0 | Status: DISCONTINUED | OUTPATIENT
Start: 2024-11-03 | End: 2024-11-07

## 2024-11-03 RX ADMIN — Medication 40 MILLIGRAM(S): at 21:48

## 2024-11-03 RX ADMIN — PETROLATUM 1 APPLICATION(S): 987.89 OINTMENT TOPICAL at 18:41

## 2024-11-03 RX ADMIN — Medication 1 TABLET(S): at 11:07

## 2024-11-03 RX ADMIN — MUPIROCIN 1 APPLICATION(S): 20 OINTMENT TOPICAL at 18:19

## 2024-11-03 RX ADMIN — DOXYCYCLINE HYCLATE 100 MILLIGRAM(S): 100 TABLET, FILM COATED ORAL at 14:56

## 2024-11-03 RX ADMIN — PANTOPRAZOLE SODIUM 40 MILLIGRAM(S): 40 TABLET, DELAYED RELEASE ORAL at 05:01

## 2024-11-03 RX ADMIN — HEPARIN SODIUM 5000 UNIT(S): 10000 INJECTION INTRAVENOUS; SUBCUTANEOUS at 18:14

## 2024-11-03 RX ADMIN — PIPERACILLIN AND TAZOBACTAM 25 GRAM(S): .5; 4 INJECTION, POWDER, LYOPHILIZED, FOR SOLUTION INTRAVENOUS at 18:17

## 2024-11-03 RX ADMIN — CHLORHEXIDINE GLUCONATE 1 APPLICATION(S): 40 SOLUTION TOPICAL at 11:09

## 2024-11-03 RX ADMIN — Medication 1 TABLET(S): at 11:08

## 2024-11-03 RX ADMIN — Medication 12.5 GRAM(S): at 03:15

## 2024-11-03 RX ADMIN — Medication 1 DROP(S): at 05:01

## 2024-11-03 RX ADMIN — Medication 81 MILLIGRAM(S): at 11:07

## 2024-11-03 RX ADMIN — PIPERACILLIN AND TAZOBACTAM 25 GRAM(S): .5; 4 INJECTION, POWDER, LYOPHILIZED, FOR SOLUTION INTRAVENOUS at 05:00

## 2024-11-03 RX ADMIN — Medication 1 DROP(S): at 18:17

## 2024-11-03 RX ADMIN — HEPARIN SODIUM 5000 UNIT(S): 10000 INJECTION INTRAVENOUS; SUBCUTANEOUS at 05:00

## 2024-11-03 RX ADMIN — MUPIROCIN 1 APPLICATION(S): 20 OINTMENT TOPICAL at 05:01

## 2024-11-03 NOTE — PROGRESS NOTE ADULT - SUBJECTIVE AND OBJECTIVE BOX
[facility] [place] NEPHROLOGY PRACTICE   MD MARIBELL CARRION MD ANGELA WONG, PA QIAN CHEN, NP    TEL:  OFFICE: [phone]  From 5pm-7am Answering Service [phone]    -- RENAL FOLLOW UP NOTE ---Date of Service 11-03-24 @ 15:06    Patient is a 69y old  Male who presents with a chief complaint of AMS.    Patient seen and examined at bedside. No chest pain/SOB.    VITALS:  T(F): 97.5 (11-03-24 @ 13:40), Max: 98.4 (11-02-24 @ 21:00)  HR: 78 (11-03-24 @ 13:40)  BP: 101/61 (11-03-24 @ 13:40)  RR: 18 (11-03-24 @ 13:40)  SpO2: 100% (11-03-24 @ 13:40)  Wt(kg): --    11-02 @ 08:01  -  11-03 @ 07:00  --------------------------------------------------------  IN: 150 mL / OUT: 1 mL / NET: 149 mL      Height (cm): 127 (11-02 @ 21:00)    PHYSICAL EXAM:  General: NAD  Neck: No JVD  Respiratory: CTAB, no wheezes, rales or rhonchi  Cardiovascular: S1, S2, RRR  Gastrointestinal: BS+, soft, NT/ND  Extremities: No peripheral edema    Hospital Medications:   MEDICATIONS  (STANDING):  AQUAPHOR (petrolatum Ointment) 1 Application(s) Topical two times a day  aspirin  chewable 81 milliGRAM(s) Oral daily  atorvastatin 40 milliGRAM(s) Oral at bedtime  bisacodyl 5 milliGRAM(s) Oral at bedtime  chlorhexidine 2% Cloths 1 Application(s) Topical daily  doxycycline IVPB 100 milliGRAM(s) IV Intermittent every 12 hours  heparin   Injectable 5000 Unit(s) SubCutaneous every 12 hours  lactobacillus acidophilus 1 Tablet(s) Oral daily  midodrine. 7.5 milliGRAM(s) Oral <User Schedule>  multivitamin 1 Tablet(s) Oral daily  mupirocin 2% Ointment 1 Application(s) Topical two times a day  pantoprazole    Tablet 40 milliGRAM(s) Oral before breakfast  piperacillin/tazobactam IVPB.. 3.375 Gram(s) IV Intermittent every 12 hours  prednisoLONE acetate 1% Suspension 1 Drop(s) Both EYES two times a day  senna 1 Tablet(s) Oral <User Schedule>      LABS:  11-03    137  |  93[L]  |  32[H]  ----------------------------<  75  4.1   |  22  |  4.66[H]    Ca    9.2      03 Nov 2024 10:20  Phos  4.7     11-03  Mg     2.10     11-03      Creatinine Trend: 4.66 <--, 3.28 <--, 5.05 <--, 3.94 <--, 3.46 <--    Phosphorus: 4.7 mg/dL (11-03 @ 10:20)               10.5   6.59  )-----------( 301      ( 03 Nov 2024 10:20 )             34.1     Urine Studies:  Urinalysis - [11-03-24 @ 10:20]      Color  / Appearance  / SG  / pH       Gluc 75 / Ketone   / Bili  / Urobili        Blood  / Protein  / Leuk Est  / Nitrite       RBC  / WBC  / Hyaline  / Gran  / Sq Epi  / Non Sq Epi  / Bacteria       Iron 23, TIBC 116, %sat 20      [11-01-24 @ 03:28]  Ferritin 1635      [11-01-24 @ 03:28]  PTH -- (Ca --)      [04-28-24 @ 05:00]   155  TSH 1.45      [07-31-24 @ 07:45]  Lipid: chol 90, TG 81, HDL 39, LDL --      [11-01-24 @ 03:28]

## 2024-11-03 NOTE — PROGRESS NOTE ADULT - SUBJECTIVE AND OBJECTIVE BOX
[facility] of Hospital Medicine  Ariel Alcantar DO  Available on MS Teams  Pager: [phone]    Patient is a 69y old  Male who presents with a chief complaint of AMS (02 Nov 2024 18:18)      OVERNIGHT EVENTS: No acute events overnight    SUBJECTIVE: Pt seen and examined at bedside this morning. Tele notable for periods of desaturation overnight but improves immediately on repositioning. AAox2 (name and place), appears around baseline mental status, no acute issues today, no cp, sob, fevers, chills. Having loose bm, no abd pain/cramps reported by him. +MRSA started on bactroban for decolonization and vancomycin as well. elevated CRP and procal as well. plan to c/w abx. BCx NGTD. Vitals and labs reviewed. SLP eval noted and on pureed diet with mildly thickened liquids now based on recommendations.     ADDITIONAL REVIEW OF SYSTEMS:    MEDICATIONS  (STANDING):  AQUAPHOR (petrolatum Ointment) 1 Application(s) Topical two times a day  aspirin  chewable 81 milliGRAM(s) Oral daily  atorvastatin 40 milliGRAM(s) Oral at bedtime  bisacodyl 5 milliGRAM(s) Oral at bedtime  chlorhexidine 2% Cloths 1 Application(s) Topical daily  heparin   Injectable 5000 Unit(s) SubCutaneous every 12 hours  lactobacillus acidophilus 1 Tablet(s) Oral daily  midodrine. 7.5 milliGRAM(s) Oral <User Schedule>  multivitamin 1 Tablet(s) Oral daily  mupirocin 2% Ointment 1 Application(s) Topical two times a day  pantoprazole    Tablet 40 milliGRAM(s) Oral before breakfast  piperacillin/tazobactam IVPB.. 3.375 Gram(s) IV Intermittent every 12 hours  prednisoLONE acetate 1% Suspension 1 Drop(s) Both EYES two times a day  senna 1 Tablet(s) Oral <User Schedule>    MEDICATIONS  (PRN):  acetaminophen     Tablet .. 650 milliGRAM(s) Oral every 6 hours PRN Temp greater or equal to 38C (100.4F), Mild Pain (1 - 3)  simethicone 80 milliGRAM(s) Chew three times a day PRN Gas      CAPILLARY BLOOD GLUCOSE      POCT Blood Glucose.: 88 mg/dL (03 Nov 2024 11:49)  POCT Blood Glucose.: 104 mg/dL (03 Nov 2024 08:03)  POCT Blood Glucose.: 233 mg/dL (03 Nov 2024 03:32)  POCT Blood Glucose.: 81 mg/dL (03 Nov 2024 03:04)  POCT Blood Glucose.: 67 mg/dL (03 Nov 2024 02:56)  POCT Blood Glucose.: 93 mg/dL (02 Nov 2024 21:40)  POCT Blood Glucose.: 96 mg/dL (02 Nov 2024 21:29)    I&O's Summary    02 Nov 2024 08:01  -  03 Nov 2024 07:00  --------------------------------------------------------  IN: 150 mL / OUT: 1 mL / NET: 149 mL        PHYSICAL EXAM:  Vital Signs Last 24 Hrs  T(C): 36.8 (03 Nov 2024 04:35), Max: 36.9 (02 Nov 2024 21:00)  T(F): 98.3 (03 Nov 2024 04:35), Max: 98.4 (02 Nov 2024 21:00)  HR: 81 (03 Nov 2024 04:35) (81 - 82)  BP: 109/54 (03 Nov 2024 04:35) (101/52 - 109/54)  BP(mean): --  RR: 17 (03 Nov 2024 04:35) (17 - 18)  SpO2: 99% (03 Nov 2024 04:35) (98% - 99%)    Parameters below as of 03 Nov 2024 04:35  Patient On (Oxygen Delivery Method): room air    CONSTITUTIONAL: NAD, frail elderly male, resting comfortably, talking without accessory mm use   HEENT: PERRLA, dry MMM, no LAD, no JVD appreciated  RESPIRATORY: No increased work of breathing, CTAB, no wheezes or crackles appreciated  CARDIOVASCULAR: RRR, S1 and S2 present, no m/r/g  ABDOMEN: soft, NT, ND, bowel sounds present  EXTREMITIES: LUE AVF site c/d/i without erythema or bleeding, +thrill noted. Radial site c/d/i with palpable pulses, area soft. R BKA and L AKA noted, stump site clean without erythema or pain.   NEURO: A&Ox2 (name and place), able to follow commands, move arms independently, UE strength intact and antigravity.         LABS:                        10.5   6.59  )-----------( 301      ( 03 Nov 2024 10:20 )             34.1     11-03    137  |  93[L]  |  32[H]  ----------------------------<  75  4.1   |  22  |  4.66[H]    Ca    9.2      03 Nov 2024 10:20  Phos  4.7     11-03  Mg     2.10     11-03            Urinalysis Basic - ( 03 Nov 2024 10:20 )    Color: x / Appearance: x / SG: x / pH: x  Gluc: 75 mg/dL / Ketone: x  / Bili: x / Urobili: x   Blood: x / Protein: x / Nitrite: x   Leuk Esterase: x / RBC: x / WBC x   Sq Epi: x / Non Sq Epi: x / Bacteria: x          RADIOLOGY & ADDITIONAL TESTS:    AM CXR and Abd xray pending    COORDINATION OF CARE:  Care Discussed with Consultants/Other Providers [Y/N]:  Prior or Outpatient Records Reviewed [Y/N]:

## 2024-11-03 NOTE — PROVIDER CONTACT NOTE (OTHER) - ASSESSMENT
Pt received heparin shot at 5am 11/3. Bleeding noticed at site gauze and tape applied. Bleeding subsided after 10 minutes.

## 2024-11-03 NOTE — PROVIDER CONTACT NOTE (OTHER) - ASSESSMENT
Pt ordered for sputum culture, unable to obtain sample as patient is cognitively impaired and unable to follow directions. Encouraged patient to cough and spit into the tube when he can.

## 2024-11-03 NOTE — PROGRESS NOTE ADULT - TIME BILLING
Preparing to see the patient including review of tests and other providers' notes, confirming history with family members, performing medical examination and evaluation, counseling and educating the patient/family/caregiver, ordering medications, tests and procedures, communicating with other health care professionals, documenting clinical information in the EMR, independently interpreting results and communicating results to the patient/family/caregiven, care coordination.

## 2024-11-03 NOTE — PROGRESS NOTE ADULT - ASSESSMENT
69 year old M hx of ESRD on HD left AVF MWFr, CVA w/ gliosis, BKA/AKA functional quadriplegia, CAD, HTN, HLD, sent from dialysis unit for AMS.  Nephrology consulted for dialysis needs    [facility]RD: from [facility]  On HD TIW via AVG  Schedule is MWF.   Consent obtained and charted from [facility] [phone]  AVG thrombosis s/p thrombectomy by vascular 11/1  hd via AVG 11/1 without issue  hd monday  - Renal diet.  Monitor BMP.    AMS  metal status seems better today  work up per team    anemia  stable and at goal.  TAINA w/ HD if Hgb worsens.  monitor Hgb.    htn  Marginal.   On midodrine pre-HD  Low salt diet.  UF w/ HD as BP permits.  monitor BP.    CKD - MBD:  Check PTH.  Not on binders.  Monitor Ca and PO4 daily.    PTX  f/u CTS pulm

## 2024-11-03 NOTE — PROGRESS NOTE ADULT - PROBLEM SELECTOR PLAN 4
- US duplex from 11/1/24 notable for: Completely thrombosed left upper extremity arteriovenous graft.  - sp LUE fistulogram and thrombectomy + stent on 11/1/24  - monitor site for any bleeding/hematoma  - active t/s, trend H/H  - HD via AVF per nephro  - discussed with vascular team about additional antiplatelet / anticoagulation given AV graft stent and recommended to c/w aspirin 81mg as pt takes at home.

## 2024-11-03 NOTE — PROGRESS NOTE ADULT - ASSESSMENT
69M from [facility] with PMHx of ESRD on HD M/W/F, Anemia, HTN, PVD s/p R BKA and L AKA, multiple CVA's w/ residual L eye left gaze deviation, Vascular Dementia (AOx2 at baseline), CAD, GERD and Hyperlipidemia who is sent from NH for AMS during HD session on 10/30. Found to have moderate size L sided PTx and elevated cardiac enzymes. RRT 10/31 for hypotension that responded to IVF"s. 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 L sided PTx being monitored with serial imaging under pulm's guidance.

## 2024-11-03 NOTE — PROVIDER CONTACT NOTE (OTHER) - ASSESSMENT
Pt complains of belly pain. Clenching stomach and grinding teeth. Denies chest pain, SOB, headache, lightheadedness. Pt is refusing tylenol. Offered by RN and LPN multiple times and each time he refused. PT had 3 BMs today 11/3

## 2024-11-04 LAB
GLUCOSE BLDC GLUCOMTR-MCNC: 82 MG/DL — SIGNIFICANT CHANGE UP (ref 70–99)
GLUCOSE BLDC GLUCOMTR-MCNC: 88 MG/DL — SIGNIFICANT CHANGE UP (ref 70–99)
GLUCOSE BLDC GLUCOMTR-MCNC: 96 MG/DL — SIGNIFICANT CHANGE UP (ref 70–99)
VANCOMYCIN FLD-MCNC: 13.3 UG/ML — SIGNIFICANT CHANGE UP

## 2024-11-04 PROCEDURE — 99233 SBSQ HOSP IP/OBS HIGH 50: CPT | Mod: GC

## 2024-11-04 PROCEDURE — 71045 X-RAY EXAM CHEST 1 VIEW: CPT | Mod: 26

## 2024-11-04 PROCEDURE — 99232 SBSQ HOSP IP/OBS MODERATE 35: CPT

## 2024-11-04 PROCEDURE — 99233 SBSQ HOSP IP/OBS HIGH 50: CPT

## 2024-11-04 RX ADMIN — CHLORHEXIDINE GLUCONATE 1 APPLICATION(S): 40 SOLUTION TOPICAL at 12:57

## 2024-11-04 RX ADMIN — Medication 1 DROP(S): at 17:40

## 2024-11-04 RX ADMIN — MUPIROCIN 1 APPLICATION(S): 20 OINTMENT TOPICAL at 17:41

## 2024-11-04 RX ADMIN — HEPARIN SODIUM 5000 UNIT(S): 10000 INJECTION INTRAVENOUS; SUBCUTANEOUS at 17:43

## 2024-11-04 RX ADMIN — Medication 1 TABLET(S): at 12:47

## 2024-11-04 RX ADMIN — Medication 1 TABLET(S): at 12:58

## 2024-11-04 RX ADMIN — PETROLATUM 1 APPLICATION(S): 987.89 OINTMENT TOPICAL at 17:40

## 2024-11-04 RX ADMIN — SIMETHICONE 80 MILLIGRAM(S): 80 TABLET, CHEWABLE ORAL at 09:23

## 2024-11-04 RX ADMIN — Medication 5 MILLIGRAM(S): at 21:18

## 2024-11-04 RX ADMIN — PIPERACILLIN AND TAZOBACTAM 25 GRAM(S): .5; 4 INJECTION, POWDER, LYOPHILIZED, FOR SOLUTION INTRAVENOUS at 12:26

## 2024-11-04 RX ADMIN — Medication 1 TABLET(S): at 17:41

## 2024-11-04 RX ADMIN — MIDODRINE HYDROCHLORIDE 7.5 MILLIGRAM(S): 2.5 TABLET ORAL at 06:08

## 2024-11-04 RX ADMIN — HEPARIN SODIUM 5000 UNIT(S): 10000 INJECTION INTRAVENOUS; SUBCUTANEOUS at 06:04

## 2024-11-04 RX ADMIN — Medication 81 MILLIGRAM(S): at 12:58

## 2024-11-04 RX ADMIN — PIPERACILLIN AND TAZOBACTAM 25 GRAM(S): .5; 4 INJECTION, POWDER, LYOPHILIZED, FOR SOLUTION INTRAVENOUS at 23:44

## 2024-11-04 RX ADMIN — MUPIROCIN 1 APPLICATION(S): 20 OINTMENT TOPICAL at 06:06

## 2024-11-04 RX ADMIN — DOXYCYCLINE HYCLATE 100 MILLIGRAM(S): 100 TABLET, FILM COATED ORAL at 15:43

## 2024-11-04 RX ADMIN — Medication 40 MILLIGRAM(S): at 21:18

## 2024-11-04 RX ADMIN — PANTOPRAZOLE SODIUM 40 MILLIGRAM(S): 40 TABLET, DELAYED RELEASE ORAL at 06:07

## 2024-11-04 RX ADMIN — PETROLATUM 1 APPLICATION(S): 987.89 OINTMENT TOPICAL at 06:02

## 2024-11-04 RX ADMIN — DOXYCYCLINE HYCLATE 100 MILLIGRAM(S): 100 TABLET, FILM COATED ORAL at 01:40

## 2024-11-04 RX ADMIN — Medication 650 MILLIGRAM(S): at 12:47

## 2024-11-04 RX ADMIN — Medication 1 DROP(S): at 06:03

## 2024-11-04 NOTE — PROGRESS NOTE ADULT - NS ATTEST RISK PROBLEM GEN_ALL_CORE FT
#Trapped lung  #ESRD
#Trapped lung  #ESRD
Medical management as above, review of results/records, discussion with patient and primary team, documentation.

## 2024-11-04 NOTE — ADVANCED PRACTICE NURSE CONSULT - ASSESSMENT
General: A&Ox1-2, bedbound, cachectic, incontinent of pasty stool. Anuric on HD, Left arm AVF with dressing in place. Skin warm, dry with increased moisture in intertriginous folds, adequate skin turgor. L AKA, R BKA, surgical sites healed.     Sacrum to bilateral buttocks: incontinence associated dermatitis as evidenced by erythema and increased moisture and scattered denudations over soft tissue. No evidence of candidiasis.     Patient continues to be at high risk for skin breakdown. On assessment patient on a low air loss surface, Z-flow positioning pillow utilized, moisture management in place with use of one breathable incontinence pad. Turned and positioned per protocol.

## 2024-11-04 NOTE — PROGRESS NOTE ADULT - ASSESSMENT
69 year old M hx of ESRD on HD left AVF MWFr, CVA w/ gliosis, BKA/AKA functional quadriplegia, CAD, HTN, HLD, sent from dialysis unit for AMS.  Nephrology consulted for dialysis needs    [facility]RD: from [facility]  On HD TIW via AVG  Schedule is MWF.   Consent obtained and charted from [facility]ton [phone]  AVG thrombosis s/p thrombectomy by vascular 11/1  hd via AVG 11/1 without issue  s/p hd today for 2 hrs 15 mins, uf 500, unable to remove more fluid due to hypotension   - Renal diet.  Monitor BMP.    AMS  metal status seems better  work up per team    anemia  stable and at goal.  TAINA w/ HD if Hgb worsens.  monitor Hgb.    htn  Marginal.   On midodrine pre-HD  Low salt diet.  UF w/ HD as BP permits.  monitor BP.    CKD - MBD:  Check PTH.  Not on binders.  Monitor Ca and PO4 daily.    PTX  f/u CTS pulm

## 2024-11-04 NOTE — PROGRESS NOTE ADULT - ASSESSMENT
69 year old man, [facility] resident,  with hx/p CAD, CM HFrEF EF 35-40% (TTE 7/2024), PAD with R BKA and L AKA, functionally quadriplegic, CVA (residual: L eye gaze deviation), HTN, HLD, Dementia, ESRD on HD (MWF via L AVF) presented to ED after becoming unresponsive during HD. In the ED he was initially HDS on RA. Noted to have elevated troponin and ECG SR with REJI in V3. Initial CXR demonstrated 3cm left basilar pneumothorax. CT chest moderate left PTX. No mediastinal shift. Overnight, RRT called for hypotension and hypoxia. BP 70s/50s improved with  cc bolus and SPO2 100% on NRB. Repeat CXR demonstrating unchanged basilar PTX. Pt with hx/o multiple L sided thoracentesis. Pulmonology was consulted for further evaluation of this basilar PTX. Bedside POCUS demonstrated basilar and lateral PTX. Lateral lung point. Lung pulse noted anteriorly. Possible bronchopleural fistula noted in the loculated space on CT causing PTX    #dyspnea  #hypoxemia  #hydropneumothorax    Recommendations:  - currently on RA, continue to monitor  - patient with dark brown/yellow-color sputum and oral secretions, reasonable to obtain sputum culture and start abx. Pt with known hx/o MRSA  - no acute intervention warranted, lack of change in pneumothorax indicates this is more consistent with trapped lung than true pneumothorax  - no pulmonary contraindications to surgery, patient is optimized from a pulmonary perspective     Inpatient pulmonology to sign off, please reconsult with any questions.

## 2024-11-04 NOTE — PROVIDER CONTACT NOTE (OTHER) - BACKGROUND
Pt admitted for COPD exacerbation.
Pt admitted for altered mental status
ESRD
PMH ESDR on HD, DM, HTN, HTN  patient admitted for altered mental status
Pt admitted for COPD exacerbation

## 2024-11-04 NOTE — PROVIDER CONTACT NOTE (OTHER) - ACTION/TREATMENT ORDERED:
ACP notified.
Rinse patient back , stop treatment
ACP notified
ACP notified. Recheck at 12:50. BP was 105/50
ACP notified.
OK to give Miralax now. continue to monitor patient. care ongoing.

## 2024-11-04 NOTE — PROGRESS NOTE ADULT - ASSESSMENT
70 y/o male with PMHx of ESRD on HD (via AVG), CAD, HFrEF (~35-40%), PAD s/p R-BKA and L-AKA, CVA, dementia, who was transferred from dialysis center for an episode of change in mental status. Found to have L-basilar pneumothorax (likely chronic), thrombosis of AV graft, elevated troponin and abnormal EKG. Cardiology consulted for an abnormal EKG and elevated troponin.    - Elevated troponin  - Abnormal EKG  - AVG thrombosis s/p thrombectomy + stent  - L-subclavian stenosis s/p balloon angioplasty  - Chronic conditions: CAD, CVA, ESRD on HD, HFrEF (EF ~35-40%)    Elevated troponin likely in setting in demand ischemia. EKG with signs of LVH and repolarization changes. No findings concerning for acute coronary syndrome. No clear indication for invasive coronary evaluation.     Currently appears euvolemic and hemodynamically stable.    Was found to have thrombosis of AV graft. Underwent diagnostic angiogram, which showed 100% occlusion of AV graft and 80% stenosis of L-subclavian artery. Underwent thrombectomy + balloon angioplasty + stent of the AV graft and balloon angioplasty of the subclavian stenosis.    RECOMMENDATIONS:  - continue ASA 81 mg daily + atorvastatin 40 mg daily  - would follow-up with vascular surgery regarding anti-platelet +/- anticoagulation agents for AV graft stent  - volume optimization with dialysis as per nephrology  - hold GDMT given concern for soft BP   - no indication for additional cardiac testing              Lázaro Robles (PGY 4)  Cardiology Fellow      Of note, these recommendations are preliminary. Case to be discussed with attending.

## 2024-11-04 NOTE — ADVANCED PRACTICE NURSE CONSULT - REASON FOR CONSULT
Patient seen on skin care rounds after wound care referral received for assessment of skin impairment and recommendations of topical management of sacral skin impairment. Patient is a 68 y/o. man residing at [facility] with PMH ESRD on HD M/W/F, multiple CVAs with residual L eye left gaze deviation, s/p R BKA and L AKA, L AVF, HTN, HLD, CAD, CVA, dementia (AOx2-3 at baseline per niece), brought in from HD after becoming unresponsive. Patient obtunded and unable to provide history. -RRT 10/31 for hypotension/tachypnea, placed on NRM, s/p 500cc bolus w/ improvement, now weaned off O2. IV antibiotics ongoing for aspiration PNA. Chart reviewed: WBC 6.59, H/H 10.5/34.1, INR 1.08, Platelets 301, hA1c 4.7, BMI 28.2, davina 12.

## 2024-11-04 NOTE — PROGRESS NOTE ADULT - SUBJECTIVE AND OBJECTIVE BOX
Interval Events:  - CXR stable   - No new respiratory symptoms     REVIEW OF SYSTEMS:  Negative except as documented above.      OBJECTIVE:  ICU Vital Signs Last 24 Hrs  T(C): 36.4 (04 Nov 2024 11:00), Max: 36.4 (03 Nov 2024 20:49)  T(F): 97.5 (04 Nov 2024 11:00), Max: 97.6 (04 Nov 2024 05:30)  HR: 74 (04 Nov 2024 11:00) (74 - 88)  BP: 112/55 (04 Nov 2024 11:00) (95/51 - 112/55)  BP(mean): --  ABP: --  ABP(mean): --  RR: 17 (04 Nov 2024 11:00) (16 - 18)  SpO2: 100% (04 Nov 2024 11:00) (99% - 100%)    O2 Parameters below as of 04 Nov 2024 11:00  Patient On (Oxygen Delivery Method): room air              11-03 @ 07:01 - 11-04 @ 07:00  --------------------------------------------------------  IN: 150 mL / OUT: 0 mL / NET: 150 mL    11-04 @ 07:01  -  11-04 @ 16:48  --------------------------------------------------------  IN: 550 mL / OUT: 1051 mL / NET: -501 mL      CAPILLARY BLOOD GLUCOSE      POCT Blood Glucose.: 96 mg/dL (04 Nov 2024 16:42)      PHYSICAL EXAM:  General: NAD, resting comfortably   HEENT:  Sclera anicteric  Cardiovascular: RRR, no murmurs appreciated   Respiratory: CTAB, normal respiratory effort   Abdomen: soft, nontender  Extremities: warm and well perfused  Skin: no rashes      HOSPITAL MEDICATIONS:  MEDICATIONS  (STANDING):  AQUAPHOR (petrolatum Ointment) 1 Application(s) Topical two times a day  aspirin  chewable 81 milliGRAM(s) Oral daily  atorvastatin 40 milliGRAM(s) Oral at bedtime  bisacodyl 5 milliGRAM(s) Oral at bedtime  chlorhexidine 2% Cloths 1 Application(s) Topical daily  doxycycline IVPB 100 milliGRAM(s) IV Intermittent every 12 hours  heparin   Injectable 5000 Unit(s) SubCutaneous every 12 hours  lactobacillus acidophilus 1 Tablet(s) Oral daily  midodrine. 7.5 milliGRAM(s) Oral <User Schedule>  multivitamin 1 Tablet(s) Oral daily  mupirocin 2% Ointment 1 Application(s) Topical two times a day  pantoprazole    Tablet 40 milliGRAM(s) Oral before breakfast  piperacillin/tazobactam IVPB.. 3.375 Gram(s) IV Intermittent every 12 hours  prednisoLONE acetate 1% Suspension 1 Drop(s) Both EYES two times a day  senna 1 Tablet(s) Oral <User Schedule>    MEDICATIONS  (PRN):  acetaminophen     Tablet .. 650 milliGRAM(s) Oral every 6 hours PRN Temp greater or equal to 38C (100.4F), Mild Pain (1 - 3)  simethicone 80 milliGRAM(s) Chew three times a day PRN Gas      LABS:                        10.5   6.59  )-----------( 301      ( 03 Nov 2024 10:20 )             34.1     Hgb Trend: 10.5<--, 10.3<--, 10.0<--, 10.3<--, 10.4<--  11-03    137  |  93[L]  |  32[H]  ----------------------------<  75  4.1   |  22  |  4.66[H]    Ca    9.2      03 Nov 2024 10:20  Phos  4.7     11-03  Mg     2.10     11-03      Creatinine Trend: 4.66<--, 3.28<--, 5.05<--, 3.94<--, 3.46<--    Urinalysis Basic - ( 03 Nov 2024 10:20 )    Color: x / Appearance: x / SG: x / pH: x  Gluc: 75 mg/dL / Ketone: x  / Bili: x / Urobili: x   Blood: x / Protein: x / Nitrite: x   Leuk Esterase: x / RBC: x / WBC x   Sq Epi: x / Non Sq Epi: x / Bacteria: x            MICROBIOLOGY:       RADIOLOGY:  [x] Reviewed and interpreted by me

## 2024-11-04 NOTE — PROGRESS NOTE ADULT - SUBJECTIVE AND OBJECTIVE BOX
Patient is a 69y old  Male who presents with a chief complaint of AMS (04 Nov 2024 13:07)    SUBJECTIVE / OVERNIGHT EVENTS: No acute events. S/p HD earlier today. Reporting some general abdominal discomfort, had abdominal XR performed yesterday without acute findings.     MEDICATIONS  (STANDING):  AQUAPHOR (petrolatum Ointment) 1 Application(s) Topical two times a day  aspirin  chewable 81 milliGRAM(s) Oral daily  atorvastatin 40 milliGRAM(s) Oral at bedtime  bisacodyl 5 milliGRAM(s) Oral at bedtime  chlorhexidine 2% Cloths 1 Application(s) Topical daily  doxycycline IVPB 100 milliGRAM(s) IV Intermittent every 12 hours  heparin   Injectable 5000 Unit(s) SubCutaneous every 12 hours  lactobacillus acidophilus 1 Tablet(s) Oral daily  midodrine. 7.5 milliGRAM(s) Oral <User Schedule>  multivitamin 1 Tablet(s) Oral daily  mupirocin 2% Ointment 1 Application(s) Topical two times a day  pantoprazole    Tablet 40 milliGRAM(s) Oral before breakfast  piperacillin/tazobactam IVPB.. 3.375 Gram(s) IV Intermittent every 12 hours  prednisoLONE acetate 1% Suspension 1 Drop(s) Both EYES two times a day  senna 1 Tablet(s) Oral <User Schedule>    MEDICATIONS  (PRN):  acetaminophen     Tablet .. 650 milliGRAM(s) Oral every 6 hours PRN Temp greater or equal to 38C (100.4F), Mild Pain (1 - 3)  simethicone 80 milliGRAM(s) Chew three times a day PRN Gas      CAPILLARY BLOOD GLUCOSE      POCT Blood Glucose.: 88 mg/dL (04 Nov 2024 11:09)  POCT Blood Glucose.: 82 mg/dL (04 Nov 2024 10:26)  POCT Blood Glucose.: 103 mg/dL (04 Nov 2024 07:42)  POCT Blood Glucose.: 80 mg/dL (04 Nov 2024 05:58)  POCT Blood Glucose.: 80 mg/dL (03 Nov 2024 21:32)  POCT Blood Glucose.: 84 mg/dL (03 Nov 2024 16:29)    I&O's Summary    03 Nov 2024 07:01  -  04 Nov 2024 07:00  --------------------------------------------------------  IN: 150 mL / OUT: 0 mL / NET: 150 mL    04 Nov 2024 07:01  -  04 Nov 2024 13:30  --------------------------------------------------------  IN: 550 mL / OUT: 1051 mL / NET: -501 mL        PHYSICAL EXAM:  Vital Signs Last 24 Hrs  T(C): 36.4 (04 Nov 2024 11:00), Max: 36.4 (03 Nov 2024 13:40)  T(F): 97.5 (04 Nov 2024 11:00), Max: 97.6 (04 Nov 2024 05:30)  HR: 74 (04 Nov 2024 11:00) (74 - 88)  BP: 112/55 (04 Nov 2024 11:00) (95/51 - 112/55)  BP(mean): --  RR: 17 (04 Nov 2024 11:00) (16 - 18)  SpO2: 100% (04 Nov 2024 11:00) (99% - 100%)    Parameters below as of 04 Nov 2024 11:00  Patient On (Oxygen Delivery Method): room air    CONSTITUTIONAL: NAD, laying in bed  ENMT: Moist oral mucosa  RESPIRATORY: Normal respiratory effort; lungs are clear to auscultation bilaterally  CARDIOVASCULAR: Regular rate and rhythm, normal S1 and S2, No lower extremity edema  ABDOMEN: soft, nondistended, no guarding, reports mild tenderness throughout  PSYCH: calm    LABS:                        10.5   6.59  )-----------( 301      ( 03 Nov 2024 10:20 )             34.1     11-03    137  |  93[L]  |  32[H]  ----------------------------<  75  4.1   |  22  |  4.66[H]    Ca    9.2      03 Nov 2024 10:20  Phos  4.7     11-03  Mg     2.10     11-03    Urinalysis Basic - ( 03 Nov 2024 10:20 )    Color: x / Appearance: x / SG: x / pH: x  Gluc: 75 mg/dL / Ketone: x  / Bili: x / Urobili: x   Blood: x / Protein: x / Nitrite: x   Leuk Esterase: x / RBC: x / WBC x   Sq Epi: x / Non Sq Epi: x / Bacteria: x    RADIOLOGY & ADDITIONAL TESTS: Reviewed    COORDINATION OF CARE:  Care Discussed with Consultants/Other Providers [Y- Medicine ACP, CM, SW, RN]

## 2024-11-04 NOTE — PROVIDER CONTACT NOTE (OTHER) - SITUATION
patient complains of abdominal pain, feels like he has to go to bathroom
Pt BGM recheck 75
Pt BP 94/44.
Pt complaining of 8/10 belly pain.
Pt ordered for sputum culture, unable to obtain sample as patient is cognitively impaired and unable to follow directions.
Pt complaining of 8/10 belly pain.
Pt received heparin shot at 5am 11/3. Bleeding noticed at site gauze and tape applied. Bleeding subsided after 10 minutes.
patient blood pressure dropped during the treatment ,below the parameter, patient restless and agitated

## 2024-11-04 NOTE — PROGRESS NOTE ADULT - SUBJECTIVE AND OBJECTIVE BOX
[facility] [place] [facility]PHROLOGY PRACTICE   MD MARIBELL CARRION MD MARIA SANTIAGO, NP    TEL:  OFFICE: [phone]  From 5pm-7am Answering Service [phone]    -- RENAL FOLLOW UP NOTE ---Date of Service 11-04-24 @ 14:12    Patient is a 69y old  Male who presents with a chief complaint of AMS      Patient seen and examined at bedside. No chest pain/sob    VITALS:  T(F): 97.5 (11-04-24 @ 11:00), Max: 97.6 (11-04-24 @ 05:30)  HR: 74 (11-04-24 @ 11:00)  BP: 112/55 (11-04-24 @ 11:00)  RR: 17 (11-04-24 @ 11:00)  SpO2: 100% (11-04-24 @ 11:00)  Wt(kg): --    11-03 @ 07:01  -  11-04 @ 07:00  --------------------------------------------------------  IN: 150 mL / OUT: 0 mL / NET: 150 mL    11-04 @ 07:01  -  11-04 @ 14:12  --------------------------------------------------------  IN: 550 mL / OUT: 1051 mL / NET: -501 mL          PHYSICAL EXAM:  General: NAD  Neck: No JVD  Respiratory: CTAB, no wheezes, rales or rhonchi  Cardiovascular: S1, S2, RRR  Gastrointestinal: BS+, soft, NT/ND  Extremities: No peripheral edema; L AKA, R [facility] Medications:   MEDICATIONS  (STANDING):  AQUAPHOR (petrolatum Ointment) 1 Application(s) Topical two times a day  aspirin  chewable 81 milliGRAM(s) Oral daily  atorvastatin 40 milliGRAM(s) Oral at bedtime  bisacodyl 5 milliGRAM(s) Oral at bedtime  chlorhexidine 2% Cloths 1 Application(s) Topical daily  doxycycline IVPB 100 milliGRAM(s) IV Intermittent every 12 hours  heparin   Injectable 5000 Unit(s) SubCutaneous every 12 hours  lactobacillus acidophilus 1 Tablet(s) Oral daily  midodrine. 7.5 milliGRAM(s) Oral <User Schedule>  multivitamin 1 Tablet(s) Oral daily  mupirocin 2% Ointment 1 Application(s) Topical two times a day  pantoprazole    Tablet 40 milliGRAM(s) Oral before breakfast  piperacillin/tazobactam IVPB.. 3.375 Gram(s) IV Intermittent every 12 hours  prednisoLONE acetate 1% Suspension 1 Drop(s) Both EYES two times a day  senna 1 Tablet(s) Oral <User Schedule>      LABS:  11-03    137  |  93[L]  |  32[H]  ----------------------------<  75  4.1   |  22  |  4.66[H]    Ca    9.2      03 Nov 2024 10:20  Phos  4.7     11-03  Mg     2.10     11-03      Creatinine Trend: 4.66 <--, 3.28 <--, 5.05 <--, 3.94 <--, 3.46 <--                                10.5   6.59  )-----------( 301      ( 03 Nov 2024 10:20 )             34.1     Urine Studies:  Urinalysis - [11-03-24 @ 10:20]      Color  / Appearance  / SG  / pH       Gluc 75 / Ketone   / Bili  / Urobili        Blood  / Protein  / Leuk Est  / Nitrite       RBC  / WBC  / Hyaline  / Gran  / Sq Epi  / Non Sq Epi  / Bacteria       Iron 23, TIBC 116, %sat 20      [11-01-24 @ 03:28]  Ferritin 1635      [11-01-24 @ 03:28]  PTH -- (Ca --)      [04-28-24 @ 05:00]   155  TSH 1.45      [07-31-24 @ 07:45]  Lipid: chol 90, TG 81, HDL 39, LDL --      [11-01-24 @ 03:28]        RADIOLOGY & ADDITIONAL STUDIES:

## 2024-11-04 NOTE — PROGRESS NOTE ADULT - ASSESSMENT
69M from [facility] with PMHx of ESRD on HD M/W/F, Anemia, HTN, PVD s/p R BKA and L AKA, multiple CVA's w/ residual L eye left gaze deviation, Vascular Dementia (AOx2 at baseline), CAD, GERD and Hyperlipidemia who is sent from NH for AMS during HD session on 10/30. Found to have moderate size L sided PTx and elevated cardiac enzymes. RRT 10/31 for hypotension that responded to IVF"s. 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 L sided PTx being monitored with serial imaging under pulm's guidance.

## 2024-11-04 NOTE — PROGRESS NOTE ADULT - SUBJECTIVE AND OBJECTIVE BOX
- No events overnight. Denies CP, SOB or Palpitations.   -------------------------------------------------------------------------------------------  VS:  T(F): 98 (11-01), Max: 98 (11-01)  HR: 96 (11-01) (65 - 96)  BP: 117/63 (11-01) (115/64 - 134/67)  RR: 18 (11-01)  SpO2: 100% (11-01)    PHYSICAL EXAM:  GENERAL: NAD  NECK: JVP not elevated  CHEST/LUNG: unlabored respirations. No wheezing. Basilar crackles   HEART: RRR, no significant murmurs  ABDOMEN: soft, mild tenderness, non-distended  EXTREMITIES: bilateral amputations. no edema, warm    -------------------------------------------------------------------------------------------  -------------------------------------------------------------------------------------------  Meds:  acetaminophen     Tablet .. 650 milliGRAM(s) Oral every 6 hours PRN  aspirin  chewable 81 milliGRAM(s) Oral daily  atorvastatin 40 milliGRAM(s) Oral at bedtime  bisacodyl 5 milliGRAM(s) Oral at bedtime  chlorhexidine 2% Cloths 1 Application(s) Topical daily  heparin   Injectable 5000 Unit(s) SubCutaneous every 12 hours  lactobacillus acidophilus 1 Tablet(s) Oral daily  midodrine. 7.5 milliGRAM(s) Oral <User Schedule>  multivitamin 1 Tablet(s) Oral daily  pantoprazole    Tablet 40 milliGRAM(s) Oral before breakfast  piperacillin/tazobactam IVPB.. 3.375 Gram(s) IV Intermittent every 12 hours  polyethylene glycol 3350 17 Gram(s) Oral daily  prednisoLONE acetate 1% Suspension 1 Drop(s) Both EYES two times a day  senna 2 Tablet(s) Oral at bedtime  sorbitol 70%/mineral oil/magnesium hydroxide/glycerin Enema 120 milliLiter(s) Rectal once    -------------------------------------------------------------------------------------------  Cardiovascular Diagnostic Testing:    ECG: sinus rhythm with LVH and repolarization changes    Echo:     7/2024:   1. Left ventricular cavity is normal in size. Left ventricular systolic function is moderately decreased with an ejection fraction visually estimated at 35 to 40 %.   2. Mild left ventricular hypertrophy.   3. Normal right ventricular cavity size and normal right ventricular systolic function.   4. Structurally normal mitral valve with normal leaflet excursion.   5. Trileaflet aortic valve with reduced systolic excursion. There is calcification ofthe aortic valve leaflets.   6. The peak transaortic velocity is 1.54 m/s, peak transaortic gradient is 9.5 mmHg and mean transaortic gradient is 5.0 mmHg with an LVOT/aortic valve VTI ratio of 0.43.   7. Mild aortic regurgitation.   8. Estimated pulmonary artery systolic pressure is 23 mmHg.   9. The inferior vena cava is normal in size (normal <2.1cm) with normal inspiratory collapse (normal >50%) consistent with normal right atrial pressure (~3, range 0-5mmHg).  10. No pericardial effusion seen.  11. Left pleural effusion noted.  12. Compared to the transesophageal echocardiogram performed on 4/30/2024, there have been no significant interval changes.    VA duplex:    Completely thrombosed left upper extremity arteriovenous graft.        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 Guidewire crossing was  successful.    Patient: JAMES PATRICK            MRN: [id number]  Study Date: 11/01/2024   10:25 AM      Page 1 of 4    A successful Thrombectomy was performed using a InThrill 8 Fr. X 6cm  Sheath, and a InThrill 8 Fr. X 4-10mm Thrombectomy  Catheter.      This attempt was executed for 0.0 seconds with 0 ml output volume.     A successful Balloon angioplasty was performed using a 9 X 4 X 75  Conquest.    The inflation pressure was 0 thomas for the duration of 43.0 seconds.     The inflation pressure was 0 thomas for the duration of 3.0 seconds.     The inflation pressure was 0 thomas for the duration of 144.0 seconds.     The inflation pressure was 0 thomas for the duration of 9.0 seconds.     The inflation pressure was 0 thomas for the duration of 51.0 seconds.     A successful Balloon angioplasty was performed using a 12 X 60 X 75  .    The inflation pressure was 10 thomas for the duration of 113.0 seconds.      The inflation pressure was 0 thomas for the duration of 72.0 seconds.     A successful Viabahn stent was deployed.      The inflation pressure was 12 thomas for the duration of 22.0 seconds.     A successful Balloon angioplasty was performed using a 4 X 40 X 75  .    The inflation pressure was 10 thomas for the duration of 110.0 seconds.      Following intervention there is a 1 % residual stenosis.      Left Subclavian: The initial stenosis was 90 %. Guidewire crossing was  successful.    A successful Balloon angioplasty was performed using a 5 Fr. Angled  Glidecath 65cm, and a 12 X 60 X 75 .    The inflation pressure was 10 thomas for the duration of 123.0 seconds.    -------------------------------------------------------------------------------------------

## 2024-11-04 NOTE — PROVIDER CONTACT NOTE (OTHER) - RECOMMENDATIONS
Notify ACP
will continue to monitor the Patient
follow up with provider order.

## 2024-11-04 NOTE — PROGRESS NOTE ADULT - ATTENDING COMMENTS
Personally saw and examined pt  labs and vitals reviewed  agree with above assessment and plan  pt is a and o x 0  does not appear to be in distress, resp distress  cont w3/u of AMS per primary team  no plans at this time for further cardiac testing
Pt with ESRD on HD, CVA, dementia, HFrEF; pt with hx of pleural effusion drained, now found to have loculated left hydroPTX. Pulmonary signing off, reconsult as needed.
69 year old man, [facility] resident,  with hx/p CAD, CM HFrEF EF 35-40% (TTE 7/2024), PAD with R BKA and L AKA, functionally quadriplegic, CVA (residual: L eye gaze deviation), HTN, HLD, Dementia, ESRD on HD (MWF via L AVF) presented to ED after becoming unresponsive during HD.   Consult after RRT for Hypoxia and hypotnesion in the setting of loculated basilar pneumothrax.   A/P:   Likely trapped lung with loculated Pneumothorax, air is in the same distribution as prior known effusion but patient with no recent thora.   Possibly Developed during recent coughing fit from nausea vomiting. ? new Bronchopleural fistula to the loculated Space.  Asymptomatic,   100% on R/A during exam, cough productive of brown sputum  - in this setting would advise conservative management with Observation, if not expanding/decreasing likely no t worth intervening, suspect will refill with fluid given time    - sputum cx  - On Zosyn for likely aspiration pneumonia  - Daily CX and with any progression/developed hypoxia    Given multiple unchanging CXR films, the area appears to be a trapped lung. No indication for a chest tube at this time as it will not cause the patient's lung to reexpand. Would continue to monitor clinically.     Given the information above, the patient optimized from a pulmonary stand point for AV fistula repair.     Will continue to follow.
69 year old man, [facility] resident,  with hx/p CAD, CM HFrEF EF 35-40% (TTE 7/2024), PAD with R BKA and L AKA, functionally quadriplegic, CVA (residual: L eye gaze deviation), HTN, HLD, Dementia, ESRD on HD (MWF via L AVF) presented to ED after becoming unresponsive during HD.   Consult after RRT for Hypoxia and hypotnesion in the setting of loculated basilar pneumothrax.   A/P:   Likely trapped lung with loculated Pneumothorax, air is in the same distribution as prior known effusion but patient with no recent thora.   Possibly Developed during recent coughing fit from nausea vomiting. ? new Bronchopleural fistula to the loculated Space.  Asymptomatic,   100% on R/A during exam, cough productive of brown sputum  - in this setting would advise conservative management with Observation, if not expanding/decreasing likely no t worth intervening, suspect will refill with fluid given time  - please avoid Bipap.   - sputum cx  - consider abx given cough, thick and dark sputum.   - Daily CX and with any progression/developed hypoxia

## 2024-11-05 LAB
ANION GAP SERPL CALC-SCNC: 17 MMOL/L — HIGH (ref 7–14)
BUN SERPL-MCNC: 22 MG/DL — SIGNIFICANT CHANGE UP (ref 7–23)
CALCIUM SERPL-MCNC: 9.2 MG/DL — SIGNIFICANT CHANGE UP (ref 8.4–10.5)
CHLORIDE SERPL-SCNC: 91 MMOL/L — LOW (ref 98–107)
CO2 SERPL-SCNC: 25 MMOL/L — SIGNIFICANT CHANGE UP (ref 22–31)
CREAT SERPL-MCNC: 3.78 MG/DL — HIGH (ref 0.5–1.3)
CULTURE RESULTS: SIGNIFICANT CHANGE UP
CULTURE RESULTS: SIGNIFICANT CHANGE UP
EGFR: 16 ML/MIN/1.73M2 — LOW
GLUCOSE BLDC GLUCOMTR-MCNC: 73 MG/DL — SIGNIFICANT CHANGE UP (ref 70–99)
GLUCOSE BLDC GLUCOMTR-MCNC: 75 MG/DL — SIGNIFICANT CHANGE UP (ref 70–99)
GLUCOSE BLDC GLUCOMTR-MCNC: 82 MG/DL — SIGNIFICANT CHANGE UP (ref 70–99)
GLUCOSE BLDC GLUCOMTR-MCNC: 84 MG/DL — SIGNIFICANT CHANGE UP (ref 70–99)
GLUCOSE BLDC GLUCOMTR-MCNC: 99 MG/DL — SIGNIFICANT CHANGE UP (ref 70–99)
GLUCOSE SERPL-MCNC: 64 MG/DL — LOW (ref 70–99)
HCT VFR BLD CALC: 34.3 % — LOW (ref 39–50)
HGB BLD-MCNC: 10.9 G/DL — LOW (ref 13–17)
MAGNESIUM SERPL-MCNC: 1.9 MG/DL — SIGNIFICANT CHANGE UP (ref 1.6–2.6)
MCHC RBC-ENTMCNC: 26.7 PG — LOW (ref 27–34)
MCHC RBC-ENTMCNC: 31.8 G/DL — LOW (ref 32–36)
MCV RBC AUTO: 84.1 FL — SIGNIFICANT CHANGE UP (ref 80–100)
NRBC # BLD: 0 /100 WBCS — SIGNIFICANT CHANGE UP (ref 0–0)
NRBC # FLD: 0 K/UL — SIGNIFICANT CHANGE UP (ref 0–0)
PHOSPHATE SERPL-MCNC: 4.1 MG/DL — SIGNIFICANT CHANGE UP (ref 2.5–4.5)
PLATELET # BLD AUTO: 290 K/UL — SIGNIFICANT CHANGE UP (ref 150–400)
POTASSIUM SERPL-MCNC: 3.3 MMOL/L — LOW (ref 3.5–5.3)
POTASSIUM SERPL-SCNC: 3.3 MMOL/L — LOW (ref 3.5–5.3)
RBC # BLD: 4.08 M/UL — LOW (ref 4.2–5.8)
RBC # FLD: 17.8 % — HIGH (ref 10.3–14.5)
SODIUM SERPL-SCNC: 133 MMOL/L — LOW (ref 135–145)
SPECIMEN SOURCE: SIGNIFICANT CHANGE UP
SPECIMEN SOURCE: SIGNIFICANT CHANGE UP
WBC # BLD: 4.06 K/UL — SIGNIFICANT CHANGE UP (ref 3.8–10.5)
WBC # FLD AUTO: 4.06 K/UL — SIGNIFICANT CHANGE UP (ref 3.8–10.5)

## 2024-11-05 PROCEDURE — 99232 SBSQ HOSP IP/OBS MODERATE 35: CPT

## 2024-11-05 RX ORDER — POTASSIUM CHLORIDE 10 MEQ
20 TABLET, EXTENDED RELEASE ORAL ONCE
Refills: 0 | Status: COMPLETED | OUTPATIENT
Start: 2024-11-05 | End: 2024-11-05

## 2024-11-05 RX ORDER — CARVEDILOL 25 MG/1
6.25 TABLET, FILM COATED ORAL EVERY 12 HOURS
Refills: 0 | Status: DISCONTINUED | OUTPATIENT
Start: 2024-11-05 | End: 2024-11-05

## 2024-11-05 RX ADMIN — Medication 1 TABLET(S): at 11:14

## 2024-11-05 RX ADMIN — PETROLATUM 1 APPLICATION(S): 987.89 OINTMENT TOPICAL at 05:30

## 2024-11-05 RX ADMIN — DOXYCYCLINE HYCLATE 100 MILLIGRAM(S): 100 TABLET, FILM COATED ORAL at 20:58

## 2024-11-05 RX ADMIN — DOXYCYCLINE HYCLATE 100 MILLIGRAM(S): 100 TABLET, FILM COATED ORAL at 05:31

## 2024-11-05 RX ADMIN — CHLORHEXIDINE GLUCONATE 1 APPLICATION(S): 40 SOLUTION TOPICAL at 11:14

## 2024-11-05 RX ADMIN — Medication 20 MILLIEQUIVALENT(S): at 17:12

## 2024-11-05 RX ADMIN — MUPIROCIN 1 APPLICATION(S): 20 OINTMENT TOPICAL at 05:30

## 2024-11-05 RX ADMIN — Medication 40 MILLIGRAM(S): at 21:02

## 2024-11-05 RX ADMIN — PETROLATUM 1 APPLICATION(S): 987.89 OINTMENT TOPICAL at 17:03

## 2024-11-05 RX ADMIN — Medication 1 DROP(S): at 05:30

## 2024-11-05 RX ADMIN — PANTOPRAZOLE SODIUM 40 MILLIGRAM(S): 40 TABLET, DELAYED RELEASE ORAL at 06:49

## 2024-11-05 RX ADMIN — Medication 1 TABLET(S): at 11:13

## 2024-11-05 RX ADMIN — Medication 1 DROP(S): at 17:05

## 2024-11-05 RX ADMIN — PIPERACILLIN AND TAZOBACTAM 25 GRAM(S): .5; 4 INJECTION, POWDER, LYOPHILIZED, FOR SOLUTION INTRAVENOUS at 17:13

## 2024-11-05 RX ADMIN — HEPARIN SODIUM 5000 UNIT(S): 10000 INJECTION INTRAVENOUS; SUBCUTANEOUS at 17:05

## 2024-11-05 RX ADMIN — HEPARIN SODIUM 5000 UNIT(S): 10000 INJECTION INTRAVENOUS; SUBCUTANEOUS at 05:31

## 2024-11-05 RX ADMIN — MUPIROCIN 1 APPLICATION(S): 20 OINTMENT TOPICAL at 17:03

## 2024-11-05 RX ADMIN — Medication 81 MILLIGRAM(S): at 11:13

## 2024-11-05 NOTE — PROGRESS NOTE ADULT - SUBJECTIVE AND OBJECTIVE BOX
Patient is a 69y old  Male who presents with a chief complaint of AMS (04 Nov 2024 16:47)    SUBJECTIVE / OVERNIGHT EVENTS: No acute events. States he feels "full" but abdominal pain feels better than before.     MEDICATIONS  (STANDING):  AQUAPHOR (petrolatum Ointment) 1 Application(s) Topical two times a day  aspirin  chewable 81 milliGRAM(s) Oral daily  atorvastatin 40 milliGRAM(s) Oral at bedtime  bisacodyl 5 milliGRAM(s) Oral at bedtime  chlorhexidine 2% Cloths 1 Application(s) Topical daily  doxycycline IVPB 100 milliGRAM(s) IV Intermittent every 12 hours  heparin   Injectable 5000 Unit(s) SubCutaneous every 12 hours  lactobacillus acidophilus 1 Tablet(s) Oral daily  midodrine. 7.5 milliGRAM(s) Oral <User Schedule>  multivitamin 1 Tablet(s) Oral daily  mupirocin 2% Ointment 1 Application(s) Topical two times a day  pantoprazole    Tablet 40 milliGRAM(s) Oral before breakfast  piperacillin/tazobactam IVPB.. 3.375 Gram(s) IV Intermittent every 12 hours  prednisoLONE acetate 1% Suspension 1 Drop(s) Both EYES two times a day  senna 1 Tablet(s) Oral <User Schedule>    MEDICATIONS  (PRN):  acetaminophen     Tablet .. 650 milliGRAM(s) Oral every 6 hours PRN Temp greater or equal to 38C (100.4F), Mild Pain (1 - 3)  simethicone 80 milliGRAM(s) Chew three times a day PRN Gas      CAPILLARY BLOOD GLUCOSE      POCT Blood Glucose.: 82 mg/dL (05 Nov 2024 12:00)  POCT Blood Glucose.: 73 mg/dL (05 Nov 2024 07:36)  POCT Blood Glucose.: 75 mg/dL (04 Nov 2024 23:43)  POCT Blood Glucose.: 96 mg/dL (04 Nov 2024 16:42)    I&O's Summary    04 Nov 2024 07:01  -  05 Nov 2024 07:00  --------------------------------------------------------  IN: 580 mL / OUT: 1051 mL / NET: -471 mL    05 Nov 2024 07:01  -  05 Nov 2024 12:35  --------------------------------------------------------  IN: 130 mL / OUT: 0 mL / NET: 130 mL        PHYSICAL EXAM:  Vital Signs Last 24 Hrs  T(C): 36.1 (05 Nov 2024 05:00), Max: 36.8 (04 Nov 2024 17:51)  T(F): 97 (05 Nov 2024 05:00), Max: 98.2 (04 Nov 2024 17:51)  HR: 71 (05 Nov 2024 05:00) (66 - 71)  BP: 100/53 (05 Nov 2024 05:00) (95/49 - 104/51)  BP(mean): --  RR: 17 (05 Nov 2024 05:00) (17 - 18)  SpO2: 100% (05 Nov 2024 05:00) (99% - 100%)    Parameters below as of 05 Nov 2024 05:00  Patient On (Oxygen Delivery Method): room air    CONSTITUTIONAL: NAD, laying in bed  EYES: conjunctiva and sclera clear  ENMT: Moist oral mucosa  RESPIRATORY: Normal respiratory effort; lungs are clear to auscultation bilaterally  CARDIOVASCULAR: Regular rate and rhythm, normal S1 and S2, No lower extremity edema  ABDOMEN: soft NT ND +BS  PSYCH: calm    LABS:                        10.9   4.06  )-----------( 290      ( 05 Nov 2024 04:44 )             34.3     11-05    133[L]  |  91[L]  |  22  ----------------------------<  64[L]  3.3[L]   |  25  |  3.78[H]    Ca    9.2      05 Nov 2024 04:44  Phos  4.1     11-05  Mg     1.90     11-05    Urinalysis Basic - ( 05 Nov 2024 04:44 )    Color: x / Appearance: x / SG: x / pH: x  Gluc: 64 mg/dL / Ketone: x  / Bili: x / Urobili: x   Blood: x / Protein: x / Nitrite: x   Leuk Esterase: x / RBC: x / WBC x   Sq Epi: x / Non Sq Epi: x / Bacteria: x    RADIOLOGY & ADDITIONAL TESTS: Reviewed    COORDINATION OF CARE:  Care Discussed with Consultants/Other Providers [Y- Medicine ACP, CM, SW]

## 2024-11-05 NOTE — PROGRESS NOTE ADULT - ASSESSMENT
69M from [facility] with PMHx of ESRD on HD M/W/F, Anemia, HTN, PVD s/p R BKA and L AKA, multiple CVA's w/ residual L eye left gaze deviation, Vascular Dementia (AOx2 at baseline), CAD, GERD and Hyperlipidemia who is sent from NH for AMS during HD session on 10/30. Found to have moderate size L sided PTx and elevated cardiac enzymes. RRT 10/31 for hypotension that responded to IVF"s. 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 L sided PTx evaluated by pulmonology, no acute intervention advised.

## 2024-11-05 NOTE — PROGRESS NOTE ADULT - SUBJECTIVE AND OBJECTIVE BOX
[facility] [place] [facility]   MD MARIBELL CARRION MD ANGELA WONG, PA    TEL:  OFFICE: [phone]  From 5pm-7am Answering Service [phone]    -- RENAL FOLLOW UP NOTE ---Date of Service 11-05-24 @ 15:50    Patient is a 69y old  Male who presents with a chief complaint of AMS (05 Nov 2024 12:35)      Patient seen and examined at bedside. No chest pain/sob    VITALS:  T(F): 97 (11-05-24 @ 05:00), Max: 98.2 (11-04-24 @ 17:51)  HR: 71 (11-05-24 @ 05:00)  BP: 100/53 (11-05-24 @ 05:00)  RR: 17 (11-05-24 @ 05:00)  SpO2: 100% (11-05-24 @ 05:00)  Wt(kg): --    11-04 @ 07:01  -  11-05 @ 07:00  --------------------------------------------------------  IN: 580 mL / OUT: 1051 mL / NET: -471 mL    11-05 @ 07:01  -  11-05 @ 15:50  --------------------------------------------------------  IN: 130 mL / OUT: 0 mL / NET: 130 mL          PHYSICAL EXAM:  General: NAD  Neck: No JVD  Respiratory: CTAB, no wheezes, rales or rhonchi  Cardiovascular: S1, S2, RRR  Gastrointestinal: BS+, soft, NT/ND  Extremities: b/l amputation    Hospital Medications:   MEDICATIONS  (STANDING):  AQUAPHOR (petrolatum Ointment) 1 Application(s) Topical two times a day  aspirin  chewable 81 milliGRAM(s) Oral daily  atorvastatin 40 milliGRAM(s) Oral at bedtime  bisacodyl 5 milliGRAM(s) Oral at bedtime  chlorhexidine 2% Cloths 1 Application(s) Topical daily  doxycycline IVPB 100 milliGRAM(s) IV Intermittent every 12 hours  heparin   Injectable 5000 Unit(s) SubCutaneous every 12 hours  lactobacillus acidophilus 1 Tablet(s) Oral daily  midodrine. 7.5 milliGRAM(s) Oral <User Schedule>  multivitamin 1 Tablet(s) Oral daily  mupirocin 2% Ointment 1 Application(s) Topical two times a day  pantoprazole    Tablet 40 milliGRAM(s) Oral before breakfast  piperacillin/tazobactam IVPB.. 3.375 Gram(s) IV Intermittent every 12 hours  prednisoLONE acetate 1% Suspension 1 Drop(s) Both EYES two times a day  senna 1 Tablet(s) Oral <User Schedule>      LABS:  11-05    133[L]  |  91[L]  |  22  ----------------------------<  64[L]  3.3[L]   |  25  |  3.78[H]    Ca    9.2      05 Nov 2024 04:44  Phos  4.1     11-05  Mg     1.90     11-05      Creatinine Trend: 3.78 <--, 4.66 <--, 3.28 <--, 5.05 <--, 3.94 <--, 3.46 <--    Phosphorus: 4.1 mg/dL (11-05 @ 04:44)                              10.9   4.06  )-----------( 290      ( 05 Nov 2024 04:44 )             34.3     Urine Studies:  Urinalysis - [11-05-24 @ 04:44]      Color  / Appearance  / SG  / pH       Gluc 64 / Ketone   / Bili  / Urobili        Blood  / Protein  / Leuk Est  / Nitrite       RBC  / WBC  / Hyaline  / Gran  / Sq Epi  / Non Sq Epi  / Bacteria       Iron 23, TIBC 116, %sat 20      [11-01-24 @ 03:28]  Ferritin 1635      [11-01-24 @ 03:28]  PTH -- (Ca --)      [04-28-24 @ 05:00]   155  TSH 1.45      [07-31-24 @ 07:45]  Lipid: chol 90, TG 81, HDL 39, LDL --      [11-01-24 @ 03:28]        RADIOLOGY & ADDITIONAL STUDIES:

## 2024-11-05 NOTE — PROGRESS NOTE ADULT - ASSESSMENT
69 year old M hx of ESRD on HD left AVF MWFr, CVA w/ gliosis, BKA/AKA functional quadriplegia, CAD, HTN, HLD, sent from dialysis unit for AMS.  Nephrology consulted for dialysis needs    ESRD: [facility]  On HD TIW via AVG  Schedule is MWF.   Consent obtained and charted from [facility] [phone]  AVG thrombosis s/p thrombectomy by vascular 11/1  hd via AVG 11/1 without issue  s/p hd 11/4  hd tmr  - Renal diet.  Monitor BMP.    AMS  metal status seems better  work up per team    anemia  stable and at goal.  TAINA w/ HD if Hgb worsens.  monitor Hgb.    hypokalemia  supplement kcl 20 x1  hd with high k bath  monitor    htn  Marginal.   On midodrine pre-HD  Low salt diet.  UF w/ HD as BP permits.  monitor BP.    CKD - MBD:  Check PTH.  Not on binders.  Monitor Ca and PO4 daily.    PTX  f/u CTS pulm

## 2024-11-06 ENCOUNTER — TRANSCRIPTION ENCOUNTER (OUTPATIENT)
Age: 69
End: 2024-11-06

## 2024-11-06 LAB
ANION GAP SERPL CALC-SCNC: 19 MMOL/L — HIGH (ref 7–14)
BUN SERPL-MCNC: 34 MG/DL — HIGH (ref 7–23)
CALCIUM SERPL-MCNC: 8.8 MG/DL — SIGNIFICANT CHANGE UP (ref 8.4–10.5)
CHLORIDE SERPL-SCNC: 95 MMOL/L — LOW (ref 98–107)
CO2 SERPL-SCNC: 23 MMOL/L — SIGNIFICANT CHANGE UP (ref 22–31)
CREAT SERPL-MCNC: 5.25 MG/DL — HIGH (ref 0.5–1.3)
EGFR: 11 ML/MIN/1.73M2 — LOW
GLUCOSE BLDC GLUCOMTR-MCNC: 102 MG/DL — HIGH (ref 70–99)
GLUCOSE BLDC GLUCOMTR-MCNC: 187 MG/DL — HIGH (ref 70–99)
GLUCOSE BLDC GLUCOMTR-MCNC: 63 MG/DL — LOW (ref 70–99)
GLUCOSE BLDC GLUCOMTR-MCNC: 66 MG/DL — LOW (ref 70–99)
GLUCOSE BLDC GLUCOMTR-MCNC: 66 MG/DL — LOW (ref 70–99)
GLUCOSE BLDC GLUCOMTR-MCNC: 72 MG/DL — SIGNIFICANT CHANGE UP (ref 70–99)
GLUCOSE BLDC GLUCOMTR-MCNC: 86 MG/DL — SIGNIFICANT CHANGE UP (ref 70–99)
GLUCOSE SERPL-MCNC: 72 MG/DL — SIGNIFICANT CHANGE UP (ref 70–99)
HCT VFR BLD CALC: 32.6 % — LOW (ref 39–50)
HGB BLD-MCNC: 10.6 G/DL — LOW (ref 13–17)
MAGNESIUM SERPL-MCNC: 1.9 MG/DL — SIGNIFICANT CHANGE UP (ref 1.6–2.6)
MCHC RBC-ENTMCNC: 26.6 PG — LOW (ref 27–34)
MCHC RBC-ENTMCNC: 32.5 G/DL — SIGNIFICANT CHANGE UP (ref 32–36)
MCV RBC AUTO: 81.7 FL — SIGNIFICANT CHANGE UP (ref 80–100)
NRBC # BLD: 0 /100 WBCS — SIGNIFICANT CHANGE UP (ref 0–0)
NRBC # FLD: 0 K/UL — SIGNIFICANT CHANGE UP (ref 0–0)
PHOSPHATE SERPL-MCNC: 4.9 MG/DL — HIGH (ref 2.5–4.5)
PLATELET # BLD AUTO: 313 K/UL — SIGNIFICANT CHANGE UP (ref 150–400)
POTASSIUM SERPL-MCNC: 3.7 MMOL/L — SIGNIFICANT CHANGE UP (ref 3.5–5.3)
POTASSIUM SERPL-SCNC: 3.7 MMOL/L — SIGNIFICANT CHANGE UP (ref 3.5–5.3)
RBC # BLD: 3.99 M/UL — LOW (ref 4.2–5.8)
RBC # FLD: 17.3 % — HIGH (ref 10.3–14.5)
SODIUM SERPL-SCNC: 137 MMOL/L — SIGNIFICANT CHANGE UP (ref 135–145)
WBC # BLD: 5.84 K/UL — SIGNIFICANT CHANGE UP (ref 3.8–10.5)
WBC # FLD AUTO: 5.84 K/UL — SIGNIFICANT CHANGE UP (ref 3.8–10.5)

## 2024-11-06 PROCEDURE — 99232 SBSQ HOSP IP/OBS MODERATE 35: CPT

## 2024-11-06 RX ORDER — PETROLATUM 987.89 MG/G
1 OINTMENT TOPICAL
Qty: 0 | Refills: 0 | DISCHARGE
Start: 2024-11-06

## 2024-11-06 RX ORDER — ACETAMINOPHEN 500 MG
2 TABLET ORAL
Qty: 0 | Refills: 0 | DISCHARGE
Start: 2024-11-06

## 2024-11-06 RX ORDER — ERYTHROPOIETIN 10000 [IU]/ML
5000 INJECTION, SOLUTION INTRAVENOUS; SUBCUTANEOUS
Refills: 0 | DISCHARGE

## 2024-11-06 RX ORDER — SODIUM CHLORIDE 9 MG/ML
100 INJECTION, SOLUTION INTRAMUSCULAR; INTRAVENOUS; SUBCUTANEOUS
Refills: 0 | Status: DISCONTINUED | OUTPATIENT
Start: 2024-11-06 | End: 2024-11-07

## 2024-11-06 RX ORDER — MIDODRINE HYDROCHLORIDE 2.5 MG/1
3 TABLET ORAL
Qty: 0 | Refills: 0 | DISCHARGE
Start: 2024-11-06

## 2024-11-06 RX ADMIN — MIDODRINE HYDROCHLORIDE 7.5 MILLIGRAM(S): 2.5 TABLET ORAL at 05:52

## 2024-11-06 RX ADMIN — PETROLATUM 1 APPLICATION(S): 987.89 OINTMENT TOPICAL at 05:12

## 2024-11-06 RX ADMIN — Medication 81 MILLIGRAM(S): at 11:19

## 2024-11-06 RX ADMIN — Medication 25 MILLILITER(S): at 16:53

## 2024-11-06 RX ADMIN — MUPIROCIN 1 APPLICATION(S): 20 OINTMENT TOPICAL at 17:34

## 2024-11-06 RX ADMIN — CHLORHEXIDINE GLUCONATE 1 APPLICATION(S): 40 SOLUTION TOPICAL at 11:19

## 2024-11-06 RX ADMIN — DOXYCYCLINE HYCLATE 100 MILLIGRAM(S): 100 TABLET, FILM COATED ORAL at 11:57

## 2024-11-06 RX ADMIN — Medication 1 DROP(S): at 05:12

## 2024-11-06 RX ADMIN — HEPARIN SODIUM 5000 UNIT(S): 10000 INJECTION INTRAVENOUS; SUBCUTANEOUS at 05:12

## 2024-11-06 RX ADMIN — HEPARIN SODIUM 5000 UNIT(S): 10000 INJECTION INTRAVENOUS; SUBCUTANEOUS at 17:35

## 2024-11-06 RX ADMIN — Medication 1 TABLET(S): at 11:19

## 2024-11-06 RX ADMIN — PANTOPRAZOLE SODIUM 40 MILLIGRAM(S): 40 TABLET, DELAYED RELEASE ORAL at 05:12

## 2024-11-06 RX ADMIN — PETROLATUM 1 APPLICATION(S): 987.89 OINTMENT TOPICAL at 17:35

## 2024-11-06 RX ADMIN — Medication 1 DROP(S): at 17:34

## 2024-11-06 RX ADMIN — Medication 1 TABLET(S): at 11:20

## 2024-11-06 RX ADMIN — MUPIROCIN 1 APPLICATION(S): 20 OINTMENT TOPICAL at 05:12

## 2024-11-06 NOTE — DISCHARGE NOTE PROVIDER - HOSPITAL COURSE
69M from [facility] with PMHx of ESRD on HD M/W/F, Anemia, HTN, PVD s/p R BKA and L AKA, multiple CVA's w/ residual L eye left gaze deviation, Vascular Dementia (AOx2 at baseline), CAD, GERD and Hyperlipidemia who is sent from NH for AMS during HD session on 10/30. Found to have moderate size L sided PTx and elevated cardiac enzymes. RRT 10/31 for hypotension that responded to IVF"s. 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 L sided PTx evaluated by pulmonology, no acute intervention advised.    #Metabolic encephalopathy: suspect multifactorial: left PTX, possible aspiration pneumonia, constipation. Mental status now back to baseline, confirmed with niece who agrees patient seems near baseline at this time. Formal SLP eval on 11/2/24 with recommendations for pureed diet with mildly thick liquids. BCx NGTD. No recent leukocytosis or fever. Finished 7 day course of zosyn, will dc doxycycline (received 3 days) no further abx warranted at this time. had received 1x dose of vanco on 11/2. Resume home Remeron on dc.     # Acute hypoxemic respiratory failure.   Likely in the setting of PTX and possible aspiration Pneumonia  - CT Chest: Small right effusion and trace left effusion. Moderate left pneumothorax. No mediastinal shift. Indeterminate consolidation in the left lower lobe possibly infection and/or aspiration  - RRT 10/31 for hypotension/tachypnea, placed on NRM, s/p 500cc bolus w/ improvement  - now weaned off to RA and saturating well   - Pulm following and recs appreciated, no further inpatient pulm workup or treatment advised at this time.   - as above completed 7 day course of zosyn  - formal SLP eval on 11/2/24 with recommendations for pureed diet with mildly thick liquids  - Aspiration precautions    #Pneumothorax, left.   CT Chest: Small right effusion and trace left effusion. Moderate left pneumothorax. No mediastinal shift  - Consulted CTS and Pulm on admission and suspect small L hydroPTX and no indication for pigtail placement as likely representing a trapped lung.   - no acute intervention per pulm, more likely trapped lung than true PTX per pulmonology  - breathing comfortably, saturating well on RA. Outpatient follow up.     # AV graft thrombosis.   - US duplex from 11/1/24 notable for: Completely thrombosed left upper extremity arteriovenous graft.  - sp LUE fistulogram and thrombectomy + stent on 11/1/24  - monitor site for any bleeding/hematoma  - active t/s, trend H/H  - HD via AVF per nephro  - discussed with vascular team about additional antiplatelet / anticoagulation given AV graft stent and recommended to c/w aspirin 81mg as pt takes at home.    # Elevated troponin.   No active chest pain  - Troponin peaked at 622  - EKG: NSR, HR 75, LVH, T elevation in V3, TWI shubham-lateral leads (several EKG's done since adm are similar)  - Seen by Cards, apprec recs - suspect elevated, non dynamic HST ISO CKD, at worst type II MI, just elevation in setting of renal dysfunction (more likely per cards)  - No further inpatient cardiac testing advised at this time  - Per cardiology continue asa 81, atorvastatin 40mg qd. GDMT including coreg now discontinued due to baseline low BP requiring midodrine  - A1c and lipid profile wnl.    # ESRD on dialysis.   On HD Mon/Wed/Friday, session on 10/30 was not completed due to AMS  - Gets midodrine 7.5mg prior to HD sessions  - Hypotensive 10/31 w/ subsequent RRT, BP improved after 500cc NS  - lactate likely elevated in setting of hypovolemia and improved with improvement in vitals  - Monitor BP closely  - renal recs appreciated; c/w HS per renal  - coreg discontinued due to SBP remaining 90-100s.    # Hypertension.   On carvedilol 6.25mg BID and gets midodrine 7.5mg prior to HD sessions  - Hypotensive 10/31 w/ subsequent RRT, BP improved after 500cc NS and vitals currently stable  - BP remains soft at baseline, coreg discontinued as risks of hypotension felt to outweigh benefits.    # Constipation: improved.     # Anemia of chronic disease.   Baseline Hg of 9-10  - Likely AOCD, based on iron studies review from 5/2024  - iron studies c/w AOCD   - Gets epogen on HD days  -  Renal follow up for consideration of Epo during HD in the future.    # PVD (peripheral vascular disease).   S/p R BKA and L AKA  - C/w ASA and lipitor.    # Vascular dementia.   : H/o multiple CVA's w/ residual L eye left gaze deviation, Vascular Dementia (AOx2 at baseline)  - CTH: No acute intracranial hemorrhage/mass effect/midline shift. Microvascular ischemic/involutional changes. Again seen-1.2 cm hyperdense mass in L cerebellopontine angle previously characterized as a schwannoma,stable  - MS waxes/wanes, now back at baseline  - Functional quadriplegia  - Dietitian evaluation, suspect severe protein calorie malnutrition.  - resume Remeron on discharge     69M from [facility] with PMHx of ESRD on HD M/W/F, Anemia, HTN, PVD s/p R BKA and L AKA, multiple CVA's w/ residual L eye left gaze deviation, Vascular Dementia (AOx2 at baseline), CAD, GERD and Hyperlipidemia who is sent from NH for AMS during HD session on 10/30. Found to have moderate size L sided PTx and elevated cardiac enzymes. RRT 10/31 for hypotension that responded to IVF"s. 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 L sided PTx evaluated by pulmonology, no acute intervention advised.    #Metabolic encephalopathy: suspect multifactorial: left PTX, possible aspiration pneumonia, constipation. Mental status now back to baseline, confirmed with niece who agrees patient seems near baseline at this time. Formal SLP eval on 11/2/24 with recommendations for pureed diet with mildly thick liquids. BCx NGTD. No recent leukocytosis or fever. Finished 7 day course of zosyn, will dc doxycycline (received 3 days) no further abx warranted at this time. had received 1x dose of vanco on 11/2. Resume home Remeron on dc.     #Hypoglycemia: Episode of glucose to 63 on 11/6 suspected 2/2 reduced poor PO intake. Since then, PO intake encouraged, recent glucoses improved since then. Mental status appears at baseline. PO encouraged.     # Acute hypoxemic respiratory failure.   Likely in the setting of PTX and possible aspiration Pneumonia  - CT Chest: Small right effusion and trace left effusion. Moderate left pneumothorax. No mediastinal shift. Indeterminate consolidation in the left lower lobe possibly infection and/or aspiration  - RRT 10/31 for hypotension/tachypnea, placed on NRM, s/p 500cc bolus w/ improvement  - now weaned off to RA and saturating well   - Pulm following and recs appreciated, no further inpatient pulm workup or treatment advised at this time.   - as above completed 7 day course of zosyn  - formal SLP eval on 11/2/24 with recommendations for pureed diet with mildly thick liquids  - Aspiration precautions    #Pneumothorax, left.   CT Chest: Small right effusion and trace left effusion. Moderate left pneumothorax. No mediastinal shift  - Consulted CTS and Pulm on admission and suspect small L hydroPTX and no indication for pigtail placement as likely representing a trapped lung.   - no acute intervention per pulm, more likely trapped lung than true PTX per pulmonology  - breathing comfortably, saturating well on RA. Outpatient follow up.     # AV graft thrombosis.   - US duplex from 11/1/24 notable for: Completely thrombosed left upper extremity arteriovenous graft.  - sp LUE fistulogram and thrombectomy + stent on 11/1/24  - monitor site for any bleeding/hematoma  - active t/s, trend H/H  - HD via AVF per nephro  - discussed with vascular team about additional antiplatelet / anticoagulation given AV graft stent and recommended to c/w aspirin 81mg as pt takes at home.    # Elevated troponin.   No active chest pain  - Troponin peaked at 622  - EKG: NSR, HR 75, LVH, T elevation in V3, TWI shubham-lateral leads (several EKG's done since adm are similar)  - Seen by Cards, apprec recs - suspect elevated, non dynamic HST ISO CKD, at worst type II MI, just elevation in setting of renal dysfunction (more likely per cards)  - No further inpatient cardiac testing advised at this time  - Per cardiology continue asa 81, atorvastatin 40mg qd. GDMT including coreg now discontinued due to baseline low BP requiring midodrine  - A1c and lipid profile wnl.    # ESRD on dialysis.   On HD Mon/Wed/Friday, session on 10/30 was not completed due to AMS  - Gets midodrine 7.5mg prior to HD sessions  - Hypotensive 10/31 w/ subsequent RRT, BP improved after 500cc NS  - lactate likely elevated in setting of hypovolemia and improved with improvement in vitals  - Monitor BP closely  - renal recs appreciated; c/w HS per renal  - coreg discontinued due to SBP remaining 90-100s.    # Hypertension.   On carvedilol 6.25mg BID and gets midodrine 7.5mg prior to HD sessions  - Hypotensive 10/31 w/ subsequent RRT, BP improved after 500cc NS and vitals currently stable  - BP remains soft at baseline, coreg discontinued as risks of hypotension felt to outweigh benefits.    # Constipation: improved.     # Anemia of chronic disease.   Baseline Hg of 9-10  - Likely AOCD, based on iron studies review from 5/2024  - iron studies c/w AOCD   - Gets epogen on HD days  -  Renal follow up for consideration of Epo during HD in the future.    # PVD (peripheral vascular disease).   S/p R BKA and L AKA  - C/w ASA and lipitor.    # Vascular dementia.   : H/o multiple CVA's w/ residual L eye left gaze deviation, Vascular Dementia (AOx2 at baseline)  - CTH: No acute intracranial hemorrhage/mass effect/midline shift. Microvascular ischemic/involutional changes. Again seen-1.2 cm hyperdense mass in L cerebellopontine angle previously characterized as a schwannoma,stable  - MS waxes/wanes, now back at baseline  - Functional quadriplegia  - Dietitian evaluation, suspect severe protein calorie malnutrition.  - resume Remeron on discharge

## 2024-11-06 NOTE — CHART NOTE - NSCHARTNOTEFT_GEN_A_CORE
NUTRITION FOLLOW-UP:    69M from [facility] with PMH of ESRD on HD M/W/F, Anemia, HTN, PVD s/p R BKA and L AKA, multiple CVA's w/ residual L eye left gaze deviation, Vascular Dementia (AOx2 at baseline), CAD, GERD and Hyperlipidemia who is sent from NH for AMS during HD session on 10/30. Found to have moderate size L sided PTx and elevated cardiac enzymes. RRT 10/31 for hypotension that responded to IVF"s. 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 L sided PTx evaluated by pulmonology, no acute intervention advised.    pt f/u for malnutrition, consuming 50% intake of meals with assist. No GI distress (nausea/vomiting/diarrhea/constipation.) at present. Noted skin ecchymosis, no edema per nursing flow sheet. Pt tolerating HD well. Last BM on 11/3/24 per nursing flow sheet.  Pt maintained on bowel regimen.     Weight: 99.8# (11/4/24)    Labs:  11-06 Na 137 mmol/L Glu 72 mg/dL K+ 3.7 mmol/L Cr 5.25 mg/dL[H] BUN 34 mg/dL[H] Phos 4.9 mg/dL[H]  11-06 @ 11:39 POCT 72 mg/dL  11-06 @ 05:50 POCT 86 mg/dL  11-05 @ 22:36 POCT 99 mg/dL  11-05 @ 17:01 POCT 84 mg/dL    Current Diet: Diet, Regular:   Pureed (PUREED)  Mildly Thick Liquids (MILDTHICKLIQS)  For patients receiving Renal Replacement - No Protein Restr, No Conc K, No Conc Phos, Low Sodium (RENAL) (11-02-24 @ 11:34) [Active]    Skin- ecchymosis, no edema as per RN flow sheet    MEDICATIONS  (STANDING):  AQUAPHOR (petrolatum Ointment) 1 Application(s) Topical two times a day  aspirin  chewable 81 milliGRAM(s) Oral daily  atorvastatin 40 milliGRAM(s) Oral at bedtime  bisacodyl 5 milliGRAM(s) Oral at bedtime  chlorhexidine 2% Cloths 1 Application(s) Topical daily  doxycycline IVPB 100 milliGRAM(s) IV Intermittent every 12 hours  heparin   Injectable 5000 Unit(s) SubCutaneous every 12 hours  lactobacillus acidophilus 1 Tablet(s) Oral daily  midodrine. 7.5 milliGRAM(s) Oral <User Schedule>  multivitamin 1 Tablet(s) Oral daily  mupirocin 2% Ointment 1 Application(s) Topical two times a day  pantoprazole    Tablet 40 milliGRAM(s) Oral before breakfast  prednisoLONE acetate 1% Suspension 1 Drop(s) Both EYES two times a day  senna 1 Tablet(s) Oral <User Schedule>    Estimated needs:  No changes since previous assessment    Nutrition Diagnosis:  Ongoing    RD to Remain Available:    Additional Recommendations:   Continue with current diet as ordered.   Monitor PO intake, weight trends, nutrition related lab values, BMs/GI distress, hydration status, skin integrity.       Cindy Woodruff RDN #[id number]

## 2024-11-06 NOTE — DISCHARGE NOTE NURSING/CASE MANAGEMENT/SOCIAL WORK - PATIENT PORTAL LINK FT
You can access the FollowMyHealth Patient Portal offered by [facility] by registering at the following website: [email or web address]. By joining [facility]’s FollowMyHealth portal, you will also be able to view your health information using other applications (apps) compatible with our system.

## 2024-11-06 NOTE — PROGRESS NOTE ADULT - PROBLEM SELECTOR PROBLEM 5
Elevated troponin
Elevated troponin
decreased breath sounds, + rhonchi
Elevated troponin

## 2024-11-06 NOTE — PROGRESS NOTE ADULT - PROBLEM SELECTOR PLAN 7
On carvedilol 6.25mg BID and gets midodrine 7.5mg prior to HD sessions  - Hypotensive 10/31 w/ subsequent RRT, BP improved after 500cc NS and vitals currently stable  - Monitor BP closely and consider resuming coreg as tolerated
On carvedilol 6.25mg BID and gets midodrine 7.5mg prior to HD sessions  - Hypotensive 10/31 w/ subsequent RRT, BP improved after 500cc NS and vitals currently stable  - Monitor BP closely and consider resuming coreg as tolerated
On carvedilol 6.25mg BID and gets midodrine 7.5mg prior to HD sessions  - Hypotensive 10/31 w/ subsequent RRT, BP improved after 500cc NS and vitals currently stable  - Monitor BP closely and consider resuming coreg as clinical status allows
On carvedilol 6.25mg BID and gets midodrine 7.5mg prior to HD sessions  - Hypotensive 10/31 w/ subsequent RRT, BP improved after 500cc NS and vitals currently stable  - BP remains soft at baseline, coreg discontinued as risks of hypotension felt to outweigh benefits
On carvedilol 6.25mg BID and gets midodrine 7.5mg prior to HD sessions  - Hypotensive 10/31 w/ subsequent RRT, BP improved after 500cc NS and vitals currently stable  - Monitor BP closely and consider resuming coreg as clinical status allows
On carvedilol 6.25mg BID and gets midodrine 7.5mg prior to HD sessions  - Hypotensive 10/31 w/ subsequent RRT, BP improved after 500cc NS and vitals currently stable  - BP remains soft at baseline, coreg discontinued as risks of hypotension felt to outweigh benefits

## 2024-11-06 NOTE — PROGRESS NOTE ADULT - NSPROGADDITIONALINFOA_GEN_ALL_CORE
Updated Niece, Ramonita Carlton (HCP) [phone]. She spoke to patient earlier today on the phone, had a conversation, patient seemed to be back at baseline mental status per Niece  Plan for DC back to [facility] if able
DC: pending placement

## 2024-11-06 NOTE — PROVIDER CONTACT NOTE (HYPOGLYCEMIA EVENT) - NS PROVIDER CONTACT CONTRIBUTING FACTORS OF EPISODE
Poor oral intake within the last 24 hours/Previous finger stick less than 100 mg/dL
Poor oral intake within the last 24 hours

## 2024-11-06 NOTE — DISCHARGE NOTE PROVIDER - NSDCCPCAREPLAN_GEN_ALL_CORE_FT
PRINCIPAL DISCHARGE DIAGNOSIS  Diagnosis: Altered mental status  Assessment and Plan of Treatment: You were hospitalized with altered mental status likely from a few components including infection, low blood pressure, pre-existing dementia. Your mental status significantly improved and appears back to baseline. Please take all medications as directed and follow up closely with your outpatient doctors after discharge.      SECONDARY DISCHARGE DIAGNOSES  Diagnosis: Acute hypoxemic respiratory failure  Assessment and Plan of Treatment: You had low oxygen levels and difficulty breathing which has since improved. We suspect it was related to a lung infection for which you received antibiotics, along with a pneumothorax for which the pulmonary specialists evaluated you at the hospital. No further inpatient workup was advised. Please follow up with your primary doctor after discharge and discuss repeating lung imaging in the future to re-evaluate.

## 2024-11-06 NOTE — DISCHARGE NOTE PROVIDER - CARE PROVIDER_API CALL
Patient ID: Jarrod Lopez is a 41 y.o. male.    Chief Complaint: Follow-up (42 yo male here for HTN recheck with no c/o. Pt states he scheduled for left knee surgery in 05/11/2023 with Dr. Mccarty. KM)    Mr. Lopez presents today for scheduled follow up for HTN. He was seen by ortho service line this morning and he will be having a meniscectomy in the coming weeks by Dr. Mccarty. His blood pressure is elevated during the visit. He denies chest pain, heart palpitations or lightheadedness. He states he has taken medication as prescribed. He states his pain is well controlled so that should not be a contributing factor in his blood pressure. His labs looks good and were reviewed in great detail.     Hypertension  This is a chronic problem. The current episode started more than 1 year ago. The problem has been waxing and waning since onset. The problem is resistant. Pertinent negatives include no blurred vision, chest pain, headaches, palpitations, peripheral edema or shortness of breath. Agents associated with hypertension include NSAIDs. Risk factors for coronary artery disease include male gender, obesity, stress and sedentary lifestyle. Past treatments include calcium channel blockers, alpha 1 blockers and diuretics. The current treatment provides no improvement. Compliance problems include exercise and diet.      We reviewed overdue health maintenance.     Past Medical History:   Diagnosis Date    Hypertension      History reviewed. No pertinent surgical history.      Tobacco History:  reports that he has quit smoking. His smoking use included cigarettes. He has never used smokeless tobacco.      Review of patient's allergies indicates:  No Known Allergies    Current Outpatient Medications:     amLODIPine (NORVASC) 10 MG tablet, Take 1 tablet (10 mg total) by mouth once daily., Disp: 30 tablet, Rfl: 3    FLUoxetine 20 MG capsule, Take 1 capsule (20 mg total) by mouth once daily., Disp: 30 capsule, Rfl: 11     multivitamin (THERAGRAN) per tablet, Take 1 tablet by mouth once daily., Disp: , Rfl:     hydroCHLOROthiazide (HYDRODIURIL) 25 MG tablet, Take 1 tablet (25 mg total) by mouth once daily., Disp: 30 tablet, Rfl: 2    losartan (COZAAR) 100 MG tablet, Take 1 tablet (100 mg total) by mouth once daily., Disp: 90 tablet, Rfl: 3    traMADoL (ULTRAM) 50 mg tablet, Take 1 tablet (50 mg total) by mouth every 12 (twelve) hours as needed for Pain. (Patient not taking: Reported on 4/26/2023), Disp: 30 tablet, Rfl: 0    Review of Systems   Constitutional:  Positive for appetite change and fatigue. Negative for activity change and fever.   HENT:  Negative for congestion, dental problem, nosebleeds, postnasal drip, rhinorrhea, sinus pain, sore throat and tinnitus.    Eyes:  Negative for blurred vision, pain, discharge, itching and visual disturbance.   Respiratory:  Negative for cough, chest tightness, shortness of breath and wheezing.    Cardiovascular:  Negative for chest pain and palpitations.   Gastrointestinal:  Negative for abdominal pain, blood in stool, constipation, diarrhea, nausea and vomiting.   Endocrine: Negative for cold intolerance, heat intolerance, polydipsia, polyphagia and polyuria.   Genitourinary:  Negative for difficulty urinating, flank pain, genital sores, hematuria and urgency.   Musculoskeletal:  Negative for arthralgias and myalgias.   Skin:  Negative for rash and wound.   Allergic/Immunologic: Negative for environmental allergies and food allergies.   Neurological:  Negative for dizziness, light-headedness and headaches.   Hematological:  Negative for adenopathy. Does not bruise/bleed easily.   Psychiatric/Behavioral:  Negative for behavioral problems, confusion, decreased concentration, sleep disturbance and suicidal ideas. The patient is not nervous/anxious and is not hyperactive.         Objective:      Vitals:    04/26/23 1256   BP: (!) 178/126   Pulse: 100   Resp: 20   Temp: 98.1 °F (36.7 °C)  "  TempSrc: Oral   Weight: 134.9 kg (297 lb 6.4 oz)   Height: 5' 11" (1.803 m)     Physical Exam  Vitals reviewed.   Constitutional:       General: He is not in acute distress.     Appearance: Normal appearance. He is obese. He is not ill-appearing, toxic-appearing or diaphoretic.   HENT:      Head: Normocephalic and atraumatic.      Right Ear: Tympanic membrane and external ear normal. There is no impacted cerumen.      Left Ear: Tympanic membrane and external ear normal. There is no impacted cerumen.      Nose: Nose normal. No congestion or rhinorrhea.      Mouth/Throat:      Mouth: Mucous membranes are moist.      Pharynx: Oropharynx is clear. No oropharyngeal exudate or posterior oropharyngeal erythema.   Eyes:      General: No scleral icterus.        Right eye: No discharge.         Left eye: No discharge.      Extraocular Movements: Extraocular movements intact.      Conjunctiva/sclera: Conjunctivae normal.      Pupils: Pupils are equal, round, and reactive to light.   Cardiovascular:      Rate and Rhythm: Regular rhythm. Tachycardia present.      Pulses: Normal pulses.      Heart sounds: Normal heart sounds. No murmur heard.    No friction rub. No gallop.   Pulmonary:      Effort: Pulmonary effort is normal. No respiratory distress.      Breath sounds: Normal breath sounds. No wheezing, rhonchi or rales.   Chest:      Chest wall: No tenderness.   Abdominal:      General: Abdomen is flat. Bowel sounds are normal.      Palpations: Abdomen is soft. There is no mass.      Tenderness: There is no abdominal tenderness. There is no guarding or rebound.   Musculoskeletal:         General: No swelling or signs of injury. Normal range of motion.      Cervical back: Normal range of motion and neck supple. No rigidity or tenderness.      Right lower leg: No edema.      Left lower leg: No edema.   Skin:     General: Skin is warm and dry.      Capillary Refill: Capillary refill takes less than 2 seconds.      Coloration: " Skin is not pale.      Findings: No bruising or erythema.   Neurological:      General: No focal deficit present.      Mental Status: He is alert and oriented to person, place, and time. Mental status is at baseline.      Motor: No weakness.      Coordination: Coordination normal.      Gait: Gait normal.   Psychiatric:         Mood and Affect: Mood normal.         Behavior: Behavior normal.         Thought Content: Thought content normal.         Judgment: Judgment normal.         Assessment:       1. Essential hypertension           Plan:       Essential hypertension  -     hydroCHLOROthiazide (HYDRODIURIL) 25 MG tablet; Take 1 tablet (25 mg total) by mouth once daily.  Dispense: 30 tablet; Refill: 2  -     losartan (COZAAR) 100 MG tablet; Take 1 tablet (100 mg total) by mouth once daily.  Dispense: 90 tablet; Refill: 3    Blood elevated during visit. Medication adjusted. Will follow the patient and if necessary refer to cardiology for further evaluation. Patient instructed to report to ED for signs of stroke. He will take blood pressure at home and keep log.       Follow up in about 3 months (around 7/26/2023), or if symptoms worsen or fail to improve, for HTN.        4/26/2023 Mirella Cleaning NP       Primary doctor,   Phone: (   )    -  Fax: (   )    -  Follow Up Time: 1 week

## 2024-11-06 NOTE — DISCHARGE NOTE PROVIDER - NSDCFUSCHEDAPPT_GEN_ALL_CORE_FT
[facility] Physician [facility] [phone]  Scheduled Appointment: 12/06/2024    
yes

## 2024-11-06 NOTE — PROVIDER CONTACT NOTE (OTHER) - ASSESSMENT
Per Provider Blanquita Hendrickson NP, pt is not stable to be discharged to [facility] today. BRIDGETTE spoke with Melida in Admission and asked to HOLD the bed for tomorrow. BRIDGETTE has to call admissions tomorrow to f/u. Cancelled the [facility] transport.

## 2024-11-06 NOTE — PROGRESS NOTE ADULT - PROBLEM SELECTOR PLAN 2
Likely in the setting of PTX and possible aspiration Pneumonia  - CT Chest: Small right effusion and trace left effusion. Moderate left pneumothorax. No mediastinal shift. Indeterminate consolidation in the left lower lobe possibly infection and/or aspiration  - RRT 10/31 for hypotension/tachypnea, placed on NRM, s/p 500cc bolus w/ improvement  - now weaned off to RA and saturating well   - Pulm following and recs appreciated   - c/w zosyn for now for possible aspiration pneumonia, total of 5-7 days course depending on clinical status   - pending formal SLP eval, currently on minced/moist diet with thickened liquids (was on mechanical soft/thin liquids at NH)  - Aspiration precautions  - Low threshold to consult MICU, Full Code per GOC note on admission   - Consider Palliative Consultation pending clinical progress    #Elevated BNP  - BNP elevated in 52K, no active signs of volume overload on PE and CXR  - HD per neprho  - f/u echo
Likely in the setting of PTX and possible aspiration Pneumonia  - CT Chest: Small right effusion and trace left effusion. Moderate left pneumothorax. No mediastinal shift. Indeterminate consolidation in the left lower lobe possibly infection and/or aspiration  - RRT 10/31 for hypotension/tachypnea, placed on NRM, s/p 500cc bolus w/ improvement  - now weaned off to RA and saturating well   - Pulm following and recs appreciated   - as above c/w abx, anticipate 7 day course of zosyn  - follow up additional infectious studies  - formal SLP eval on 11/2/24 with recommendations for pureed diet with mildly thick liquids  - Aspiration precautions  - Low threshold to consult MICU, Full Code per GOC note on admission   - Consider Palliative Consultation pending clinical progress    #Elevated BNP  - BNP elevated in 52K, no active signs of volume overload on PE and CXR. Difficult to interpret given ESRD on HD  - HD per nephro  - Appreciate Cardiology recs. No need for further inpatient cardiac testing, echo has been discontinued.
Likely in the setting of PTX and possible aspiration Pneumonia  - CT Chest: Small right effusion and trace left effusion. Moderate left pneumothorax. No mediastinal shift. Indeterminate consolidation in the left lower lobe possibly infection and/or aspiration  - RRT 10/31 for hypotension/tachypnea, placed on NRM, s/p 500cc bolus w/ improvement  - now weaned off to RA and saturating well   - Pulm following and recs appreciated   - as above c/w abx, anticipate 7 day course of zosyn  - follow up additional infectious studies  - formal SLP eval on 11/2/24 with recommendations for pureed diet with mildly thick liquids  - Aspiration precautions    #Elevated BNP  - BNP elevated in 52K, no active signs of volume overload on PE and CXR. Difficult to interpret given ESRD on HD  - HD per nephro  - Appreciate Cardiology recs. No need for further inpatient cardiac testing, echo has been discontinued.
Likely in the setting of PTX and possible aspiration Pneumonia  - CT Chest: Small right effusion and trace left effusion. Moderate left pneumothorax. No mediastinal shift. Indeterminate consolidation in the left lower lobe possibly infection and/or aspiration  - RRT 10/31 for hypotension/tachypnea, placed on NRM, s/p 500cc bolus w/ improvement, apprec assistance  - now weaned off to RA and saturating well   - Pulm recs appreciated   - c/w zosyn for now for possible aspiration pneumonia, total of 5-7 days course depending on clinical status   - pending formal SLP eval, currently on minced/moist diet with thickened liquids (was on mechanical soft/thin liquids at NH)  - Aspiration precautions  - Low threshold to consult MICU, Full Code per GOC note on admission   - Consider Palliative Consultation pending clinical progress    #Elevated BNP  - BNP elevated in 52K, no active signs of volume overload on PE and CXR  - HD per neprho  - f/u echo
Likely in the setting of PTX and possible aspiration Pneumonia  - CT Chest: Small right effusion and trace left effusion. Moderate left pneumothorax. No mediastinal shift. Indeterminate consolidation in the left lower lobe possibly infection and/or aspiration  - RRT 10/31 for hypotension/tachypnea, placed on NRM, s/p 500cc bolus w/ improvement  - now weaned off to RA and saturating well   - Pulm following and recs appreciated   - as above c/w abx, completed 7 day course of zosyn  - follow up additional infectious studies  - formal SLP eval on 11/2/24 with recommendations for pureed diet with mildly thick liquids  - Aspiration precautions    #Elevated BNP  - BNP elevated in 52K, no active signs of volume overload on PE and CXR. Difficult to interpret given ESRD on HD  - HD per nephro  - Appreciate Cardiology recs. No need for further inpatient cardiac testing, echo has been discontinued.
Likely in the setting of PTX and possible aspiration Pneumonia  - CT Chest: Small right effusion and trace left effusion. Moderate left pneumothorax. No mediastinal shift. Indeterminate consolidation in the left lower lobe possibly infection and/or aspiration  - RRT 10/31 for hypotension/tachypnea, placed on NRM, s/p 500cc bolus w/ improvement  - now weaned off to RA and saturating well   - Pulm following and recs appreciated   - c/w zosyn, add vancomycin + doxycycline (renally dosed) given +MRSA. Elevated procal and infl markers, c/w abx for now. duration based on clinical course.   - check sputum cx, urine legionella, mycoplasma, strep pna   - formal SLP eval on 11/2/24 with recommendations for pureed diet with mildly thick liquids  - Aspiration precautions  - Low threshold to consult MICU, Full Code per GOC note on admission   - Consider Palliative Consultation pending clinical progress    #Elevated BNP  - BNP elevated in 52K, no active signs of volume overload on PE and CXR  - HD per neprho  - f/u echo

## 2024-11-06 NOTE — PROGRESS NOTE ADULT - PROBLEM SELECTOR PLAN 1
AMS during HD session on 10/30 which was not completed  - Possibly multifactorial: left PTX, possible aspiration pneumonia, constipation   - mental status continues to improve, AAOx1 (self), answering questions appropriately (baseline: Oriented x2 self/place per HCP  - Continue to monitor MS closely  - formal SLP given concerns for possible aspiration   - c/w zosyn for now for empiric abx coverage  - BCx from 10/30 NGTD  - hold home remeron for now as mental status continues to improve but still not back to baseline.
AMS during HD session on 10/30 which was not completed  - Possibly multifactorial: left PTX, possible aspiration pneumonia, constipation   - mental status continues to improve, AAOx2 (self and place), answering questions appropriately (baseline: Oriented x2 self/place per HCP  - Continue to monitor MS closely  - formal SLP eval on 11/2/24 with recommendations for pureed diet with mildly thick liquids  - c/w zosyn, add vancomycin + doxycycline (+MRSA, known hx of MRSA as well) for now for empiric abx coverage  - BCx from 10/30 NGTD  - hold home remeron for now
AMS during HD session on 10/30 which was not completed  - Possibly multifactorial: left PTX, possible aspiration pneumonia, constipation   - mental status improving, AAOx1 (self), answering questions appropriately (baseline: Oriented x2 self/place per HCP  - Continue to monitor MS closely  - formal SLP given concerns for possible aspiration  - c/w zosyn for now for empiric abx coverage  - BCx from 10/30 NGTD
AMS during HD session on 10/30 which was not completed  - Possibly multifactorial: left PTX, possible aspiration pneumonia, constipation   - mental status now seems back to baseline. Confirmed with niece who spoke with patient today, states he seems at baseline  - formal SLP eval on 11/2/24 with recommendations for pureed diet with mildly thick liquids  - Today is day 6 of zosyn, day 3 of doxycycline. had received 1x dose of vanco on 11/2  - BCx NGTD  - No recent leukocytosis or fever  - Will plan to complete abx on 11/6 for 7 day course of zosyn  - hold home remeron for now
AMS during HD session on 10/30 which was not completed  - Possibly multifactorial: left PTX, possible aspiration pneumonia, constipation   - mental status now seems back to baseline. Confirmed with niece who spoke with patient today, states he seems at baseline  - formal SLP eval on 11/2/24 with recommendations for pureed diet with mildly thick liquids  - BCx NGTD  - No recent leukocytosis or fever  - finished 7 day course of zosyn, will dc doxycycline (received 3 days) no further abx warranted at this time. had received 1x dose of vanco on 11/2  - hold home remeron for now
AMS during HD session on 10/30 which was not completed  - Possibly multifactorial: left PTX, possible aspiration pneumonia, constipation   - mental status near baseline (orientation to self/place)  - Continue to monitor MS closely  - formal SLP eval on 11/2/24 with recommendations for pureed diet with mildly thick liquids  - Today is day 5 of zosyn, day 2 of doxycycline. had received 1x dose of vanco on 11/2  - BCx NGTD  - No recent leukocytosis or fever  - Will plan to complete abx on 11/6 for 7 day course of zosyn  - hold home remeron for now

## 2024-11-06 NOTE — PROGRESS NOTE ADULT - PROBLEM SELECTOR PLAN 11
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 Microvascular ischemic/involutional changes. Again seen-1.2 cm hyperdense mass in L cerebellopontine angle previously characterized as a schwannoma,stable  - MS waxes/wanes, at times seems to be oriented to self and place and following simple commands  - Functional quadriplegia  - Continue to monitor MS closely, continue to hold home remeron   - Dietitian evaluation, suspect severe protein calorie malnutrition
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 Microvascular ischemic/involutional changes. Again seen-1.2 cm hyperdense mass in L cerebellopontine angle previously characterized as a schwannoma,stable  - MS waxes/wanes, at times seems to be oriented to self and place and following simple commands  - Functional quadriplegia  - Continue to monitor MS closely, continue to hold home remeron   - Dietitian evaluation, suspect severe protein calorie malnutrition
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 Microvascular ischemic/involutional changes. Again seen-1.2 cm hyperdense mass in L cerebellopontine angle previously characterized as a schwannoma,stable  - MS continues to improve, currently oriented to self, follows commands, answers basic questions appropriately (baseline Oriented x2 per HCP self/place)  - Functional quadriplegia  - Continue to monitor MS closely, continue to hold home remeron   - Dietitian evaluation, suspect severe protein calorie malnutrition
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 Microvascular ischemic/involutional changes. Again seen-1.2 cm hyperdense mass in L cerebellopontine angle previously characterized as a schwannoma,stable  - MS waxes/wanes, at times seems to be oriented to self and place and following simple commands  - Functional quadriplegia  - Continue to monitor MS closely, continue to hold home remeron   - Dietitian evaluation, suspect severe protein calorie malnutrition
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 Microvascular ischemic/involutional changes. Again seen-1.2 cm hyperdense mass in L cerebellopontine angle previously characterized as a schwannoma,stable  - MS continues to improve, currently oriented to self, follows commands, answers basic questions appropriately (baseline Oriented x2 per HCP self/place)  - Functional quadriplegia  - Continue to monitor MS closely, continue to hold home remeron   - Dietitian evaluation, suspect severe protein calorie malnutrition
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 Microvascular ischemic/involutional changes. Again seen-1.2 cm hyperdense mass in L cerebellopontine angle previously characterized as a schwannoma,stable  - MS seems to be improving as above, currently oriented to self (baseline Oriented x2 per HCP self/place)  - Functional quadriplegia  - Continue to monitor MS closely  - Dietitian evaluation, suspect severe protein calorie malnutrition

## 2024-11-06 NOTE — PROGRESS NOTE ADULT - ASSESSMENT
69M from [facility] with PMHx of ESRD on HD M/W/F, Anemia, HTN, PVD s/p R BKA and L AKA, multiple CVA's w/ residual L eye left gaze deviation, Vascular Dementia (AOx2 at baseline), CAD, GERD and Hyperlipidemia who is sent from NH for AMS during HD session on 10/30. Found to have moderate size L sided PTx and elevated cardiac enzymes. RRT 10/31 for hypotension that responded to IVF"s. 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 L sided PTx evaluated by pulmonology, no acute intervention advised.

## 2024-11-06 NOTE — PROGRESS NOTE ADULT - SUBJECTIVE AND OBJECTIVE BOX
Patient is a 69y old  Male who presents with a chief complaint of AMS (06 Nov 2024 09:50)    SUBJECTIVE / OVERNIGHT EVENTS: No acute events. Awake, denies discomfort.     MEDICATIONS  (STANDING):  AQUAPHOR (petrolatum Ointment) 1 Application(s) Topical two times a day  aspirin  chewable 81 milliGRAM(s) Oral daily  atorvastatin 40 milliGRAM(s) Oral at bedtime  bisacodyl 5 milliGRAM(s) Oral at bedtime  chlorhexidine 2% Cloths 1 Application(s) Topical daily  doxycycline IVPB 100 milliGRAM(s) IV Intermittent every 12 hours  heparin   Injectable 5000 Unit(s) SubCutaneous every 12 hours  lactobacillus acidophilus 1 Tablet(s) Oral daily  midodrine. 7.5 milliGRAM(s) Oral <User Schedule>  multivitamin 1 Tablet(s) Oral daily  mupirocin 2% Ointment 1 Application(s) Topical two times a day  pantoprazole    Tablet 40 milliGRAM(s) Oral before breakfast  prednisoLONE acetate 1% Suspension 1 Drop(s) Both EYES two times a day  senna 1 Tablet(s) Oral <User Schedule>    MEDICATIONS  (PRN):  acetaminophen     Tablet .. 650 milliGRAM(s) Oral every 6 hours PRN Temp greater or equal to 38C (100.4F), Mild Pain (1 - 3)  simethicone 80 milliGRAM(s) Chew three times a day PRN Gas  sodium chloride 0.9% Bolus. 100 milliLiter(s) IV Bolus every 5 minutes PRN SBP LESS THAN or EQUAL to 80 mmHg    CAPILLARY BLOOD GLUCOSE    POCT Blood Glucose.: 72 mg/dL (06 Nov 2024 11:39)  POCT Blood Glucose.: 86 mg/dL (06 Nov 2024 05:50)  POCT Blood Glucose.: 99 mg/dL (05 Nov 2024 22:36)  POCT Blood Glucose.: 84 mg/dL (05 Nov 2024 17:01)    I&O's Summary    05 Nov 2024 07:01  -  06 Nov 2024 07:00  --------------------------------------------------------  IN: 445 mL / OUT: 0 mL / NET: 445 mL    06 Nov 2024 07:01  -  06 Nov 2024 14:48  --------------------------------------------------------  IN: 560 mL / OUT: 1500 mL / NET: -940 mL    PHYSICAL EXAM:  Vital Signs Last 24 Hrs  T(C): 36 (06 Nov 2024 09:50), Max: 36.6 (06 Nov 2024 06:27)  T(F): 96.8 (06 Nov 2024 09:50), Max: 97.9 (06 Nov 2024 06:27)  HR: 70 (06 Nov 2024 09:50) (69 - 79)  BP: 114/47 (06 Nov 2024 09:50) (101/53 - 138/68)  BP(mean): --  RR: 17 (06 Nov 2024 09:50) (17 - 18)  SpO2: 100% (06 Nov 2024 05:10) (100% - 100%)    Parameters below as of 06 Nov 2024 09:50  Patient On (Oxygen Delivery Method): room air    CONSTITUTIONAL: NAD, laying in bed  EYES: conjunctiva and sclera clear  ENMT: Moist oral mucosa  RESPIRATORY: Normal respiratory effort; lungs are clear to auscultation bilaterally  CARDIOVASCULAR: Regular rate and rhythm, normal S1 and S2, No lower extremity edema  ABDOMEN: soft nontender ND +BS  PSYCH: calm    LABS:                        10.6   5.84  )-----------( 313      ( 06 Nov 2024 08:00 )             32.6     11-06    137  |  95[L]  |  34[H]  ----------------------------<  72  3.7   |  23  |  5.25[H]    Ca    8.8      06 Nov 2024 08:00  Phos  4.9     11-06  Mg     1.90     11-06    Urinalysis Basic - ( 06 Nov 2024 08:00 )    Color: x / Appearance: x / SG: x / pH: x  Gluc: 72 mg/dL / Ketone: x  / Bili: x / Urobili: x   Blood: x / Protein: x / Nitrite: x   Leuk Esterase: x / RBC: x / WBC x   Sq Epi: x / Non Sq Epi: x / Bacteria: x    RADIOLOGY & ADDITIONAL TESTS: Reviewed    COORDINATION OF CARE:  Care Discussed with Consultants/Other Providers [Y- Medicine ACP, CM, SW, RN]

## 2024-11-06 NOTE — PROGRESS NOTE ADULT - PROBLEM SELECTOR PLAN 5
No active chest pain  - Troponin peaked at 622  - EKG: NSR, HR 75, LVH, T elevation in V3, TWI shubham-lateral leads (several EKG's done since adm are similar)  - Seen by Cards, apprec recs - suspect elevated, non dynamic HST ISO CKD, at worst type II MI, just elevation in setting of renal dysfunction (more likely per cards)  - No further inpatient cardiac testing advised at this time  - Per cardiology continue asa 81, atorvastatin 40mg qd. GDMT including coreg now discontinued due to baseline low BP requiring midodrine  - A1c and lipid profile wnl
No active chest pain  - Troponin peaked at 622  - EKG: NSR, HR 75, LVH, T elevation in V3, TWI shubham-lateral leads (several EKG's done since adm are similar)  - Seen by Cards, apprec recs - suspect elevated, non dynamic HST ISO CKD, at worst type II MI, just elevation in setting of renal dysfunction (more likely per cards)  - No further inpatient cardiac testing advised at this time  - Per cardiology continue asa 81, atorvastatin 40mg qd. GDMT including coreg now discontinued due to baseline low BP requiring midodrine  - A1c and lipid profile wnl
No active chest pain  - Troponin peaked at 622  - EKG: NSR, HR 75, LVH, T elevation in V3, TWI shubham-lateral leads (several EKG's done since adm are similar)  - Seen by Cards, apprec recs - suspect elevated, non dynamic HST ISO CKD, at worst type II MI, just elevation in setting of renal dysfunction (more likely per cards)  - F/up TTE  - A1c and lipid profile wnl   - Telemetry monitoring for now, can likely be d/c soon per Cards recs
No active chest pain  - Troponin peaked at 622  - EKG: NSR, HR 75, LVH, T elevation in V3, TWI shubham-lateral leads (several EKG's done since adm are similar)  - Seen by Cards, apprec recs - suspect elevated, non dynamic HST ISO CKD, at worst type II MI, just elevation in setting of renal dysfunction (more likely per cards)  - F/up formal TTE  - A1c and lipid profile wnl   - Telemetry monitoring for now, can likely be d/c soon per Cards recs
No active chest pain  - Troponin peaked at 622  - EKG: NSR, HR 75, LVH, T elevation in V3, TWI shubham-lateral leads (several EKG's done since adm are similar)  - Seen by Cards, apprec recs - suspect elevated, non dynamic HST ISO CKD, at worst type II MI, just elevation in setting of renal dysfunction (more likely per cards)  - F/up formal TTE  - A1c and lipid profile wnl   - Telemetry monitoring for now, can likely be d/c soon per Cards recs
No active chest pain  - Troponin peaked at 622  - EKG: NSR, HR 75, LVH, T elevation in V3, TWI shubham-lateral leads (several EKG's done since adm are similar)  - Seen by Cards, apprec recs - suspect elevated, non dynamic HST ISO CKD, at worst type II MI, just elevation in setting of renal dysfunction (more likely per cards)  - No further inpatient cardiac testing advised at this time  - A1c and lipid profile wnl

## 2024-11-06 NOTE — PROGRESS NOTE ADULT - PROBLEM SELECTOR PROBLEM 11
Vascular dementia

## 2024-11-06 NOTE — PROVIDER CONTACT NOTE (HYPOGLYCEMIA EVENT) - NS PROVIDER CONTACT BACKGROUND-HYPO
Age: 69y    Gender: Male    POCT Blood Glucose:  102 mg/dL (11-06-24 @ 17:29)  66 mg/dL (11-06-24 @ 17:03)  66 mg/dL (11-06-24 @ 16:44)  63 mg/dL (11-06-24 @ 16:12)  72 mg/dL (11-06-24 @ 11:39)  86 mg/dL (11-06-24 @ 05:50)  99 mg/dL (11-05-24 @ 22:36)      eMAR:atorvastatin   40 milliGRAM(s) Oral (11-05-24 @ 21:02)    dextrose 50% Injectable   25 milliLiter(s) IV Push (11-06-24 @ 16:53)    
Age: 69y    Gender: Male    POCT Blood Glucose:  233 mg/dL (11-03-24 @ 03:32)  81 mg/dL (11-03-24 @ 03:04)  67 mg/dL (11-03-24 @ 02:56)  93 mg/dL (11-02-24 @ 21:40)  96 mg/dL (11-02-24 @ 21:29)  84 mg/dL (11-02-24 @ 12:25)  75 mg/dL (11-02-24 @ 08:16)  74 mg/dL (11-02-24 @ 07:49)      eMAR:atorvastatin   40 milliGRAM(s) Oral (11-02-24 @ 20:44)    dextrose 50% Injectable   12.5 Gram(s) IV Push (11-03-24 @ 03:15)

## 2024-11-06 NOTE — PROGRESS NOTE ADULT - PROBLEM SELECTOR PLAN 3
CT Chest: Small right effusion and trace left effusion. Moderate left pneumothorax. No mediastinal shift  - Consulted CTS and Pulm on admission and suspect small L hydroPTX and no indication for pigtail placement as likely representing a trapped lung.   - If patient is requiring PTC, recommend IR consult  - no acute intervention per pulm, monitor with serial CXR to assess for the PTX  - avoid bilevel ventilation   - now on RA   - Continuous pulse-oximetry monitoring
CT Chest: Small right effusion and trace left effusion. Moderate left pneumothorax. No mediastinal shift  - Consulted CTS and Pulm on admission and suspect small L hydroPTX and no indication for pigtail placement as likely representing a trapped lung.   - no acute intervention per pulm, more likely trapped lung than true PTX per pulmonology  - breathing comfortably, saturating well on RA
CT Chest: Small right effusion and trace left effusion. Moderate left pneumothorax. No mediastinal shift  - Consulted CTS and Pulm on admission and suspect small L hydroPTX and no indication for pigtail placement as likely representing a trapped lung.   - If patient is requiring PTC, recommend IR consult  - no acute intervention per pulm, monitor with serial CXR to assess for the PTX,  - avoid bilevel ventilation   - now on RA   - Continuous pulse-oximetry monitoring
CT Chest: Small right effusion and trace left effusion. Moderate left pneumothorax. No mediastinal shift  - Consulted CTS and Pulm on admission and suspect small L hydroPTX and no indication for pigtail placement as likely representing a trapped lung.   - If patient is requiring PTC, recommend IR consult  - no acute intervention per pulm, monitor with serial CXR to assess for the PTX  - avoid bilevel ventilation   - now on RA   - Continuous pulse-oximetry monitoring
CT Chest: Small right effusion and trace left effusion. Moderate left pneumothorax. No mediastinal shift  - Consulted CTS on admission and suspect small L hydroPTX and no indication for pigtail placement as likely representing a trapped lung.   - If patient is requiring PTC, recommend IR consult  - no acute intervention per pulm, monitor with serial CXR to assess for the PTX,  - avoid bilevel ventilation   - now on RA   - Continuous pulse-oximetry
CT Chest: Small right effusion and trace left effusion. Moderate left pneumothorax. No mediastinal shift  - Consulted CTS and Pulm on admission and suspect small L hydroPTX and no indication for pigtail placement as likely representing a trapped lung.   - no acute intervention per pulm, more likely trapped lung than true PTX per pulmonology  - breathing comfortably, saturating well on RA

## 2024-11-06 NOTE — PROGRESS NOTE ADULT - PROBLEM SELECTOR PLAN 10
S/p R BKA and L AKA  - C/w ASA and lipitor

## 2024-11-06 NOTE — DISCHARGE NOTE NURSING/CASE MANAGEMENT/SOCIAL WORK - FINANCIAL ASSISTANCE
[facility] provides services at a reduced cost to those who are determined to be eligible through [facility]’s financial assistance program. Information regarding [facility]’s financial assistance program can be found by going to [email or web address] or by calling [phone].

## 2024-11-06 NOTE — DISCHARGE NOTE NURSING/CASE MANAGEMENT/SOCIAL WORK - NSDCVIVACCINE_GEN_ALL_CORE_FT
Tdap; 15-Aug-2017 22:58; Shalom Stanley); [employer]; [id number]; IntraMuscular; Deltoid Left.; 0.5 milliLiter(s); VIS (VIS Published: 09-May-2013, VIS Presented: 15-Aug-2017);

## 2024-11-06 NOTE — DISCHARGE NOTE PROVIDER - NSDCMRMEDTOKEN_GEN_ALL_CORE_FT
aspirin 81 mg oral capsule: 1 cap(s) orally once a day  atorvastatin 40 mg oral tablet: 1 tab(s) orally once a day  carvedilol 6.25 mg oral tablet: 1 tab(s) orally 2 times a day  Epogen 20,000 units/mL injectable solution: 5,000 unit(s) intravenously 3 times a week Each Mon/Wed/Fri  furosemide 40 mg oral tablet: 1 tab(s) orally 2 times a day  lactobacillus acidophilus: 1 tab(s) once a day  midodrine: 7.5 milligram(s) orally 3 times a week three times a week before dialysis  mirtazapine 7.5 mg oral tablet: 1 tab(s) orally once a day (at bedtime)  multivitamin: 1 tab(s) orally once a day  NexIUM 40 mg oral delayed release capsule: 1 cap(s) orally once a day  nystatin 100,000 units/g with emollients topical kit: Apply topically to affected area 2 times a day B/L Groin  prednisoLONE ophthalmic: 1 application 2 times a day 1% BID  senna (sennosides) 8.6 mg oral tablet: 1 tab(s) orally once a day  vitamin c 500 mg once a day:    acetaminophen 325 mg oral tablet: 2 tab(s) orally every 6 hours As needed Temp greater or equal to 38C (100.4F), Mild Pain (1 - 3)  aspirin 81 mg oral capsule: 1 cap(s) orally once a day  atorvastatin 40 mg oral tablet: 1 tab(s) orally once a day  lactobacillus acidophilus: 1 tab(s) once a day  midodrine 2.5 mg oral tablet: 3 tab(s) orally 3 times a week On Hemodialysis days, prior to HD  mirtazapine 7.5 mg oral tablet: 1 tab(s) orally once a day (at bedtime)  multivitamin: 1 tab(s) orally once a day  NexIUM 40 mg oral delayed release capsule: 1 cap(s) orally once a day  nystatin 100,000 units/g with emollients topical kit: Apply topically to affected area 2 times a day B/L Groin  petrolatum topical ointment: 1 Apply topically to affected area 2 times a day  prednisoLONE ophthalmic: 1 application 2 times a day 1% BID  senna (sennosides) 8.6 mg oral tablet: 1 tab(s) orally once a day  vitamin c 500 mg once a day:

## 2024-11-06 NOTE — DISCHARGE NOTE NURSING/CASE MANAGEMENT/SOCIAL WORK - NSDCPEFALRISK_GEN_ALL_CORE
For information on Fall & Injury Prevention, visit: [email or web address] OR  [email or web address] OR  https://www.cdc.gov/steadi/patient.html

## 2024-11-06 NOTE — PROGRESS NOTE ADULT - PROBLEM SELECTOR PLAN 8
- CT AP notable for: Rectum is distended with stool to 7 cm. There is a moderate colonic stool   burden. No small bowel obstruction.  - sp suppository in the ED   - miralax, senna, and dulcolax for bowel regimen  - enema   - monitor BM closely
- CT AP notable for: Rectum is distended with stool to 7 cm. There is a moderate colonic stool   burden. No small bowel obstruction.  - having loose BM  - repeat abd xray to assess further   - c/w senna and dulcolax for bowel regimen. gas x prn. *hold for loose BM or >3 BM in 24 hours*  - monitor BM closely
- CT AP notable for: Rectum is distended with stool to 7 cm. There is a moderate colonic stool   burden. No small bowel obstruction.  - having loose BM  - repeat abd xray 11/3 without acute findings  - c/w senna and dulcolax for bowel regimen. gas x prn. *hold for loose BM or >3 BM in 24 hours*  - monitor BM closely
- CT AP notable for: Rectum is distended with stool to 7 cm. There is a moderate colonic stool   burden. No small bowel obstruction.  - having BM  - c/w senna and dulcolax for bowel regimen. gas x prn.   - monitor BM closely
- CT AP notable for: Rectum is distended with stool to 7 cm. There is a moderate colonic stool   burden. No small bowel obstruction.  - having loose BM  - repeat abd xray 11/3 without acute findings  - c/w senna and dulcolax for bowel regimen. gas x prn. *hold for loose BM or >3 BM in 24 hours*  - monitor BM closely
- CT AP notable for: Rectum is distended with stool to 7 cm. There is a moderate colonic stool   burden. No small bowel obstruction.  - having loose BM  - repeat abd xray 11/3 without acute findings  - c/w senna and dulcolax for bowel regimen. gas x prn. *hold for loose BM or >3 BM in 24 hours*  - monitor BM closely

## 2024-11-06 NOTE — PROGRESS NOTE ADULT - ASSESSMENT
69 year old M hx of ESRD on HD left AVF MWFr, CVA w/ gliosis, BKA/AKA functional quadriplegia, CAD, HTN, HLD, sent from dialysis unit for AMS.  Nephrology consulted for dialysis needs    ESRD: from [facility]  On HD TIW via AVG  Schedule is MWF.   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  Monitor BMP.    AMS  metal status seems better  work up per team    anemia  stable and at goal.  TAINA w/ HD if Hgb worsens.  monitor Hgb.    hypokalemia  hd with high k bath  monitor    htn  Marginal.   On midodrine pre-HD  Low salt diet.  UF w/ HD as BP permits.  monitor BP.    CKD - MBD:  Check PTH.  Not on binders.  Monitor Ca and PO4 daily.    PTX  f/u CTS pulm

## 2024-11-06 NOTE — PROGRESS NOTE ADULT - PROBLEM SELECTOR PLAN 6
On HD Mon/Wed/Friday, session on 10/30 was not completed due to AMS  - Gets midodrine 7.5mg prior to HD sessions  - Hypotensive 10/31 w/ subsequent RRT, BP improved after 500cc NS  - lactate likely elevated in setting of hypovolemia and improved with improvement in vitals  - Monitor BP closely  - renal recs appreciated; c/w HS per renal  - hold home coreg in the interim
On HD Mon/Wed/Friday, session on 10/30 was not completed due to AMS  - Gets midodrine 7.5mg prior to HD sessions  - Hypotensive 10/31 w/ subsequent RRT, BP improved after 500cc NS  - lactate likely elevated in setting of hypovolemia and improved with improvement in vitals  - Monitor BP closely  - renal recs appreciated; c/w HS per renal  - coreg discontinued due to SBP remaining 90-100s
On HD Mon/Wed/Friday, session on 10/30 was not completed due to AMS  - Gets midodrine 7.5mg prior to HD sessions  - Hypotensive 10/31 w/ subsequent RRT, BP improved after 500cc NS  - lactate likely elevated in setting of hypovolemia and improved with improvement in vitals  - Monitor BP closely  - renal recs appreciated; plan for HD via L AVF graft today
On HD Mon/Wed/Friday, session on 10/30 was not completed due to AMS  - Gets midodrine 7.5mg prior to HD sessions  - Hypotensive 10/31 w/ subsequent RRT, BP improved after 500cc NS  - lactate likely elevated in setting of hypovolemia and improved with improvement in vitals  - Monitor BP closely  - renal recs appreciated; c/w HS per renal  - coreg discontinued due to SBP remaining 90-100s
On HD Mon/Wed/Friday, session on 10/30 was not completed due to AMS  - Gets midodrine 7.5mg prior to HD sessions  - Hypotensive 10/31 w/ subsequent RRT, BP improved after 500cc NS  - lactate likely elevated in setting of hypovolemia and improved with improvement in vitals  - Monitor BP closely  - renal recs appreciated; plan for HD via L AVF graft today  - hold home coreg in the interim
On HD Mon/Wed/Friday, session on 10/30 was not completed due to AMS  - Gets midodrine 7.5mg prior to HD sessions  - Hypotensive 10/31 w/ subsequent RRT, BP improved after 500cc NS  - lactate likely elevated in setting of hypovolemia and improved with improvement in vitals  - Monitor BP closely  - renal recs appreciated; plan for HD via L AVF graft today  - hold home coreg in the interim

## 2024-11-06 NOTE — DISCHARGE NOTE PROVIDER - NSDCFUADDAPPT_GEN_ALL_CORE_FT
-- Sacrum to bilateral buttocks: Cleanse with skin cleanser, pat dry. Apply Critic-aid moisture barrier ointment twice a day and PRN with incontinent episodes.   -- Continue low air loss bed therapy, continue heel elevation, continue to turn & reposition per protocol, soft pillow between bony prominences, continue moisture management with barrier creams & single breathable pad, continue measures to decrease friction/shear/pressure. Continue with nutritional support as per dietary/orders.

## 2024-11-06 NOTE — DISCHARGE NOTE PROVIDER - ATTENDING DISCHARGE PHYSICAL EXAMINATION:
T 97.8 HR 81, /52, RR 18 Saturating 100 on RA  CONSTITUTIONAL: NAD, inclined in bed  EYES: conjunctiva and sclera clear  ENMT: Moist oral mucosa  RESPIRATORY: Normal respiratory effort; lungs are clear to auscultation bilaterally  CARDIOVASCULAR: Regular rate and rhythm, normal S1 and S2, No lower extremity edema  ABDOMEN: soft nontender ND +BS  PSYCH: calm

## 2024-11-06 NOTE — CHART NOTE - NSCHARTNOTEFT_GEN_A_CORE
Notified by primary RN that patient's blood glucose was 63 and after administration of apple juice, 66. Pt planned for discharge today back to [facility].  Discussed with medical attending, Dr. Morrow, and will cancel discharge at this time and monitor patient's blood sugar overnight. 1/2 Amp of D5 ordered for hypoglycemia. PT currently on PO diet. Continue to monitor FS q6h.     Called  nhan to cancel transportation to [place] as discharge canceled.     Blanquita Hendrickson NP   Department of Medicine, [facility]  P. [phone]

## 2024-11-06 NOTE — PROGRESS NOTE ADULT - NUTRITIONAL ASSESSMENT
This patient has been assessed with a concern for Malnutrition and has been determined to have a diagnosis/diagnoses of Severe protein-calorie malnutrition.    This patient is being managed with:   Diet Regular-  Pureed (PUREED)  Mildly Thick Liquids (MILDTHICKLIQS)  For patients receiving Renal Replacement - No Protein Restr No Conc K No Conc Phos Low Sodium (RENAL)  Entered: Nov 2 2024 11:35AM  

## 2024-11-06 NOTE — PROGRESS NOTE ADULT - PROBLEM SELECTOR PLAN 12
Heparin SQ  PT evaluation  FULL CODE per GOC

## 2024-11-06 NOTE — PROGRESS NOTE ADULT - PROBLEM SELECTOR PLAN 9
Baseline Hg of 9-10  - Likely AOCD, based on iron studies review from 5/2024  - iron studies c/w AOCD   - Gets epogen on HD days  - F/up further Renal recs regarding Epo during HD
Baseline Hg of 9-10  - Likely AOCD, based on iron studies review from 5/2024  - iron studies c/w AOCD   - Gets epogen on HD days  - F/up further Renal recs regarding Epo during HD
Baseline Hg of 9-10  - Likely AOCD, based on iron studies review from 5/2024  - iron studies c/w AOCD   - Gets epogen on HD days  -  Renal follow up for consideration of Epo during HD in the future
Baseline Hg of 9-10  - Likely AOCD, based on iron studies review from 5/2024  - iron studies c/w AOCD   - Gets epogen on HD days  - F/up further Renal recs regarding Epo during HD

## 2024-11-06 NOTE — PROGRESS NOTE ADULT - PROBLEM SELECTOR PROBLEM 4
AV graft thrombosis

## 2024-11-06 NOTE — DISCHARGE NOTE NURSING/CASE MANAGEMENT/SOCIAL WORK - NSDCPEPTSTROKESIGNS_GEN_ALL_CORE
Sudden numbness or weakness of the face, arm, or leg, especially on one side of the body. Confusion, trouble speaking or understanding. Trouble seeing in one or both eyes. Trouble walking, dizziness, loss of balance or coordination. Severe headache.
Alert and oriented to person, place and time

## 2024-11-06 NOTE — PROGRESS NOTE ADULT - SUBJECTIVE AND OBJECTIVE BOX
[facility]PHROLOGY PRACTICE   MD MARIBELL CARRION MD ANGELA WONG, PA    TEL:  OFFICE: [phone]  From 5pm-7am Answering Service [phone]    -- RENAL FOLLOW UP NOTE ---Date of Service 11-06-24 @ 09:50    Patient is a 69y old  Male who presents with a chief complaint of AMS (05 Nov 2024 15:50)      Patient seen and examined at bedside. No chest pain/sob    VITALS:  T(F): 97.9 (11-06-24 @ 06:27), Max: 97.9 (11-06-24 @ 06:27)  HR: 72 (11-06-24 @ 06:27)  BP: 138/68 (11-06-24 @ 06:27)  RR: 18 (11-06-24 @ 06:27)  SpO2: 100% (11-06-24 @ 05:10)  Wt(kg): --    11-05 @ 07:01  -  11-06 @ 07:00  --------------------------------------------------------  IN: 445 mL / OUT: 0 mL / NET: 445 mL    11-06 @ 07:01  -  11-06 @ 09:50  --------------------------------------------------------  IN: 500 mL / OUT: 1500 mL / NET: -1000 mL          PHYSICAL EXAM:  General: NAD  Neck: No JVD  Respiratory: CTAB, no wheezes, rales or rhonchi  Cardiovascular: S1, S2, RRR  Gastrointestinal: BS+, soft, NT/ND  Extremities: b/l amputation    Hospital Medications:   MEDICATIONS  (STANDING):  AQUAPHOR (petrolatum Ointment) 1 Application(s) Topical two times a day  aspirin  chewable 81 milliGRAM(s) Oral daily  atorvastatin 40 milliGRAM(s) Oral at bedtime  bisacodyl 5 milliGRAM(s) Oral at bedtime  chlorhexidine 2% Cloths 1 Application(s) Topical daily  doxycycline IVPB 100 milliGRAM(s) IV Intermittent every 12 hours  heparin   Injectable 5000 Unit(s) SubCutaneous every 12 hours  lactobacillus acidophilus 1 Tablet(s) Oral daily  midodrine. 7.5 milliGRAM(s) Oral <User Schedule>  multivitamin 1 Tablet(s) Oral daily  mupirocin 2% Ointment 1 Application(s) Topical two times a day  pantoprazole    Tablet 40 milliGRAM(s) Oral before breakfast  prednisoLONE acetate 1% Suspension 1 Drop(s) Both EYES two times a day  senna 1 Tablet(s) Oral <User Schedule>      LABS:  11-06    137  |  95[L]  |  34[H]  ----------------------------<  72  3.7   |  23  |  5.25[H]    Ca    8.8      06 Nov 2024 08:00  Phos  4.9     11-06  Mg     1.90     11-06      Creatinine Trend: 5.25 <--, 3.78 <--, 4.66 <--, 3.28 <--, 5.05 <--, 3.94 <--, 3.46 <--    Phosphorus: 4.9 mg/dL (11-06 @ 08:00)                              10.6   5.84  )-----------( 313      ( 06 Nov 2024 08:00 )             32.6     Urine Studies:  Urinalysis - [11-06-24 @ 08:00]      Color  / Appearance  / SG  / pH       Gluc 72 / Ketone   / Bili  / Urobili        Blood  / Protein  / Leuk Est  / Nitrite       RBC  / WBC  / Hyaline  / Gran  / Sq Epi  / Non Sq Epi  / Bacteria       Iron 23, TIBC 116, %sat 20      [11-01-24 @ 03:28]  Ferritin 1635      [11-01-24 @ 03:28]  PTH -- (Ca --)      [04-28-24 @ 05:00]   155  TSH 1.45      [07-31-24 @ 07:45]  Lipid: chol 90, TG 81, HDL 39, LDL --      [11-01-24 @ 03:28]        RADIOLOGY & ADDITIONAL STUDIES:

## 2024-11-07 VITALS
RESPIRATION RATE: 17 BRPM | TEMPERATURE: 98 F | DIASTOLIC BLOOD PRESSURE: 59 MMHG | SYSTOLIC BLOOD PRESSURE: 118 MMHG | HEART RATE: 79 BPM | OXYGEN SATURATION: 98 %

## 2024-11-07 LAB
GLUCOSE BLDC GLUCOMTR-MCNC: 128 MG/DL — HIGH (ref 70–99)
GLUCOSE BLDC GLUCOMTR-MCNC: 206 MG/DL — HIGH (ref 70–99)

## 2024-11-07 PROCEDURE — 99239 HOSP IP/OBS DSCHRG MGMT >30: CPT

## 2024-11-07 RX ADMIN — Medication 1 TABLET(S): at 12:26

## 2024-11-07 RX ADMIN — Medication 1 DROP(S): at 05:06

## 2024-11-07 RX ADMIN — MUPIROCIN 1 APPLICATION(S): 20 OINTMENT TOPICAL at 05:05

## 2024-11-07 RX ADMIN — Medication 1 TABLET(S): at 12:25

## 2024-11-07 RX ADMIN — Medication 81 MILLIGRAM(S): at 12:24

## 2024-11-07 RX ADMIN — PETROLATUM 1 APPLICATION(S): 987.89 OINTMENT TOPICAL at 05:06

## 2024-11-07 RX ADMIN — PANTOPRAZOLE SODIUM 40 MILLIGRAM(S): 40 TABLET, DELAYED RELEASE ORAL at 05:06

## 2024-11-07 RX ADMIN — HEPARIN SODIUM 5000 UNIT(S): 10000 INJECTION INTRAVENOUS; SUBCUTANEOUS at 05:05

## 2024-11-07 RX ADMIN — CHLORHEXIDINE GLUCONATE 1 APPLICATION(S): 40 SOLUTION TOPICAL at 12:26

## 2024-11-07 NOTE — PROGRESS NOTE ADULT - SUBJECTIVE AND OBJECTIVE BOX
[facility] [place] [facility]ROLOGY PRACT[facility]   MD MARIBELL CARRION MD ANGELA WONG, PA    TEL:  OFFICE: [phone]  From 5pm-7am Answering Service [phone]    -- RENAL FOLLOW UP NOTE ---Date of Service 11-07-24 @ 09:46    Patient is a 69y old  Male who presents with a chief complaint of AMS (06 Nov 2024 15:54)      Patient seen and examined at bedside. No chest pain/sob    VITALS:  T(F): 97.8 (11-07-24 @ 05:05), Max: 97.9 (11-06-24 @ 21:30)  HR: 81 (11-07-24 @ 05:05)  BP: 107/52 (11-07-24 @ 05:05)  RR: 18 (11-07-24 @ 05:05)  SpO2: 100% (11-07-24 @ 05:05)  Wt(kg): --    11-06 @ 07:01  -  11-07 @ 07:00  --------------------------------------------------------  IN: 890 mL / OUT: 1500 mL / NET: -610 mL          PHYSICAL EXAM:  General: NAD  Neck: No JVD  Respiratory: CTAB, no wheezes, rales or rhonchi  Cardiovascular: S1, S2, RRR  Gastrointestinal: BS+, soft, NT/ND  Extremities: b/l amputation    Hospital Medications:   MEDICATIONS  (STANDING):  AQUAPHOR (petrolatum Ointment) 1 Application(s) Topical two times a day  aspirin  chewable 81 milliGRAM(s) Oral daily  atorvastatin 40 milliGRAM(s) Oral at bedtime  bisacodyl 5 milliGRAM(s) Oral at bedtime  chlorhexidine 2% Cloths 1 Application(s) Topical daily  heparin   Injectable 5000 Unit(s) SubCutaneous every 12 hours  lactobacillus acidophilus 1 Tablet(s) Oral daily  midodrine. 7.5 milliGRAM(s) Oral <User Schedule>  multivitamin 1 Tablet(s) Oral daily  pantoprazole    Tablet 40 milliGRAM(s) Oral before breakfast  prednisoLONE acetate 1% Suspension 1 Drop(s) Both EYES two times a day  senna 1 Tablet(s) Oral <User Schedule>      LABS:  11-06    137  |  95[L]  |  34[H]  ----------------------------<  72  3.7   |  23  |  5.25[H]    Ca    8.8      06 Nov 2024 08:00  Phos  4.9     11-06  Mg     1.90     11-06      Creatinine Trend: 5.25 <--, 3.78 <--, 4.66 <--, 3.28 <--, 5.05 <--, 3.94 <--                                10.6   5.84  )-----------( 313      ( 06 Nov 2024 08:00 )             32.6     Urine Studies:  Urinalysis - [11-06-24 @ 08:00]      Color  / Appearance  / SG  / pH       Gluc 72 / Ketone   / Bili  / Urobili        Blood  / Protein  / Leuk Est  / Nitrite       RBC  / WBC  / Hyaline  / Gran  / Sq Epi  / Non Sq Epi  / Bacteria       Iron 23, TIBC 116, %sat 20      [11-01-24 @ 03:28]  Ferritin 1635      [11-01-24 @ 03:28]  PTH -- (Ca --)      [04-28-24 @ 05:00]   155  TSH 1.45      [07-31-24 @ 07:45]  Lipid: chol 90, TG 81, HDL 39, LDL --      [11-01-24 @ 03:28]        RADIOLOGY & ADDITIONAL STUDIES:

## 2024-11-07 NOTE — PROGRESS NOTE ADULT - PROVIDER SPECIALTY LIST ADULT
Cardiology
Nephrology
Pulmonology
Vascular Surgery
Cardiology
Nephrology
Nephrology
Pulmonology
Thoracic Surgery
Hospitalist
Nephrology
Pulmonology
Vascular Surgery
Nephrology
Hospitalist

## 2024-11-07 NOTE — PROGRESS NOTE ADULT - NS ATTEND AMEND GEN_ALL_CORE FT
HD as above
HD today via avg
as above
HD as above
HD as above
mary worked fine  cw hd via mary
HD as above

## 2024-11-07 NOTE — PROGRESS NOTE ADULT - ASSESSMENT
69 year old M hx of ESRD on HD left AVF MWFr, CVA w/ gliosis, BKA/AKA functional quadriplegia, CAD, HTN, HLD, sent from dialysis unit for AMS.  Nephrology consulted for dialysis needs    ESRD: from [facility]  On HD TIW via AVG  Schedule is MWF.   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  Monitor BMP.    AMS  metal status seems better  work up per team    anemia  stable and at goal.  TAINA w/ HD if Hgb worsens.  monitor Hgb.    hypokalemia  hd with high k bath  monitor    htn  Marginal.   On midodrine pre-HD  Low salt diet.  UF w/ HD as BP permits.  monitor BP.    CKD - MBD:  Check PTH.  Not on binders.  Monitor Ca and PO4 daily.    PTX  f/u CTS pulm

## 2024-12-06 ENCOUNTER — APPOINTMENT (OUTPATIENT)
Dept: ELECTROPHYSIOLOGY | Facility: CLINIC | Age: 69
End: 2024-12-06

## 2024-12-24 ENCOUNTER — RESULT REVIEW (OUTPATIENT)
Age: 69
End: 2024-12-24

## 2024-12-26 ENCOUNTER — RESULT REVIEW (OUTPATIENT)
Age: 69
End: 2024-12-26

## 2025-01-01 VITALS — RESPIRATION RATE: 4 BRPM

## 2025-01-01 DIAGNOSIS — A41.9 SEPSIS, UNSPECIFIED ORGANISM: ICD-10-CM

## 2025-01-01 LAB
-  AMPICILLIN/SULBACTAM: SIGNIFICANT CHANGE UP
-  AMPICILLIN: SIGNIFICANT CHANGE UP
-  AZTREONAM: SIGNIFICANT CHANGE UP
-  AZTREONAM: SIGNIFICANT CHANGE UP
-  CEFAZOLIN: SIGNIFICANT CHANGE UP
-  CEFEPIME: SIGNIFICANT CHANGE UP
-  CEFEPIME: SIGNIFICANT CHANGE UP
-  CEFTAZIDIME: SIGNIFICANT CHANGE UP
-  CEFTRIAXONE: SIGNIFICANT CHANGE UP
-  CIPROFLOXACIN: SIGNIFICANT CHANGE UP
-  CIPROFLOXACIN: SIGNIFICANT CHANGE UP
-  ERTAPENEM: SIGNIFICANT CHANGE UP
-  GENTAMICIN: SIGNIFICANT CHANGE UP
-  IMIPENEM: SIGNIFICANT CHANGE UP
-  IMIPENEM: SIGNIFICANT CHANGE UP
-  LEVOFLOXACIN: SIGNIFICANT CHANGE UP
-  LEVOFLOXACIN: SIGNIFICANT CHANGE UP
-  MEROPENEM: SIGNIFICANT CHANGE UP
-  MEROPENEM: SIGNIFICANT CHANGE UP
-  PIPERACILLIN/TAZOBACTAM: SIGNIFICANT CHANGE UP
-  PIPERACILLIN/TAZOBACTAM: SIGNIFICANT CHANGE UP
-  TOBRAMYCIN: SIGNIFICANT CHANGE UP
-  TRIMETHOPRIM/SULFAMETHOXAZOLE: SIGNIFICANT CHANGE UP
ALBUMIN SERPL ELPH-MCNC: 2.1 G/DL — LOW (ref 3.3–5)
ALBUMIN SERPL ELPH-MCNC: 2.1 G/DL — LOW (ref 3.3–5)
ALBUMIN SERPL ELPH-MCNC: 2.2 G/DL — LOW (ref 3.3–5)
ALBUMIN SERPL ELPH-MCNC: 2.3 G/DL — LOW (ref 3.3–5)
ALBUMIN SERPL ELPH-MCNC: 2.3 G/DL — LOW (ref 3.3–5)
ALBUMIN SERPL ELPH-MCNC: 2.4 G/DL — LOW (ref 3.3–5)
ALP SERPL-CCNC: 114 U/L — SIGNIFICANT CHANGE UP (ref 40–120)
ALP SERPL-CCNC: 120 U/L — SIGNIFICANT CHANGE UP (ref 40–120)
ALP SERPL-CCNC: 124 U/L — HIGH (ref 40–120)
ALP SERPL-CCNC: 127 U/L — HIGH (ref 40–120)
ALP SERPL-CCNC: 132 U/L — HIGH (ref 40–120)
ALP SERPL-CCNC: 152 U/L — HIGH (ref 40–120)
ALT FLD-CCNC: 22 U/L — SIGNIFICANT CHANGE UP (ref 4–41)
ALT FLD-CCNC: 22 U/L — SIGNIFICANT CHANGE UP (ref 4–41)
ALT FLD-CCNC: 24 U/L — SIGNIFICANT CHANGE UP (ref 4–41)
ALT FLD-CCNC: 30 U/L — SIGNIFICANT CHANGE UP (ref 4–41)
ALT FLD-CCNC: 35 U/L — SIGNIFICANT CHANGE UP (ref 4–41)
ALT FLD-CCNC: 42 U/L — HIGH (ref 4–41)
ANION GAP SERPL CALC-SCNC: 13 MMOL/L — SIGNIFICANT CHANGE UP (ref 7–14)
ANION GAP SERPL CALC-SCNC: 15 MMOL/L — HIGH (ref 7–14)
ANION GAP SERPL CALC-SCNC: 17 MMOL/L — HIGH (ref 7–14)
ANION GAP SERPL CALC-SCNC: 18 MMOL/L — HIGH (ref 7–14)
ANION GAP SERPL CALC-SCNC: 18 MMOL/L — HIGH (ref 7–14)
ANION GAP SERPL CALC-SCNC: 19 MMOL/L — HIGH (ref 7–14)
ANISOCYTOSIS BLD QL: SLIGHT — SIGNIFICANT CHANGE UP
ANISOCYTOSIS BLD QL: SLIGHT — SIGNIFICANT CHANGE UP
APTT BLD: 29 SEC — SIGNIFICANT CHANGE UP (ref 24.5–35.6)
APTT BLD: 30 SEC — SIGNIFICANT CHANGE UP (ref 24.5–35.6)
APTT BLD: 33.4 SEC — SIGNIFICANT CHANGE UP (ref 24.5–35.6)
APTT BLD: 37.4 SEC — HIGH (ref 24.5–35.6)
APTT BLD: 38 SEC — HIGH (ref 24.5–35.6)
APTT BLD: 38 SEC — HIGH (ref 24.5–35.6)
AST SERPL-CCNC: 29 U/L — SIGNIFICANT CHANGE UP (ref 4–40)
AST SERPL-CCNC: 29 U/L — SIGNIFICANT CHANGE UP (ref 4–40)
AST SERPL-CCNC: 31 U/L — SIGNIFICANT CHANGE UP (ref 4–40)
AST SERPL-CCNC: 32 U/L — SIGNIFICANT CHANGE UP (ref 4–40)
AST SERPL-CCNC: 34 U/L — SIGNIFICANT CHANGE UP (ref 4–40)
AST SERPL-CCNC: 40 U/L — SIGNIFICANT CHANGE UP (ref 4–40)
B PERT IGG+IGM PNL SER: ABNORMAL
BASOPHILS # BLD AUTO: 0 K/UL — SIGNIFICANT CHANGE UP (ref 0–0.2)
BASOPHILS # BLD AUTO: 0 K/UL — SIGNIFICANT CHANGE UP (ref 0–0.2)
BASOPHILS # BLD AUTO: 0.01 K/UL — SIGNIFICANT CHANGE UP (ref 0–0.2)
BASOPHILS # BLD AUTO: 0.02 K/UL — SIGNIFICANT CHANGE UP (ref 0–0.2)
BASOPHILS NFR BLD AUTO: 0 % — SIGNIFICANT CHANGE UP (ref 0–2)
BASOPHILS NFR BLD AUTO: 0 % — SIGNIFICANT CHANGE UP (ref 0–2)
BASOPHILS NFR BLD AUTO: 0.1 % — SIGNIFICANT CHANGE UP (ref 0–2)
BASOPHILS NFR BLD AUTO: 0.2 % — SIGNIFICANT CHANGE UP (ref 0–2)
BILIRUB SERPL-MCNC: 0.3 MG/DL — SIGNIFICANT CHANGE UP (ref 0.2–1.2)
BILIRUB SERPL-MCNC: 0.4 MG/DL — SIGNIFICANT CHANGE UP (ref 0.2–1.2)
BILIRUB SERPL-MCNC: 0.4 MG/DL — SIGNIFICANT CHANGE UP (ref 0.2–1.2)
BILIRUB SERPL-MCNC: 0.5 MG/DL — SIGNIFICANT CHANGE UP (ref 0.2–1.2)
BLD GP AB SCN SERPL QL: NEGATIVE — SIGNIFICANT CHANGE UP
BLOOD GAS ARTERIAL COMPREHENSIVE RESULT: SIGNIFICANT CHANGE UP
BUN SERPL-MCNC: 24 MG/DL — HIGH (ref 7–23)
BUN SERPL-MCNC: 35 MG/DL — HIGH (ref 7–23)
BUN SERPL-MCNC: 42 MG/DL — HIGH (ref 7–23)
BUN SERPL-MCNC: 43 MG/DL — HIGH (ref 7–23)
BUN SERPL-MCNC: 45 MG/DL — HIGH (ref 7–23)
BUN SERPL-MCNC: 58 MG/DL — HIGH (ref 7–23)
BURR CELLS BLD QL SMEAR: PRESENT — SIGNIFICANT CHANGE UP
CA-I BLD-SCNC: 0.97 MMOL/L — LOW (ref 1.15–1.29)
CA-I BLD-SCNC: 1.03 MMOL/L — LOW (ref 1.15–1.29)
CA-I BLD-SCNC: 1.04 MMOL/L — LOW (ref 1.15–1.29)
CA-I BLD-SCNC: 1.08 MMOL/L — LOW (ref 1.15–1.29)
CALCIUM SERPL-MCNC: 7.9 MG/DL — LOW (ref 8.4–10.5)
CALCIUM SERPL-MCNC: 8.1 MG/DL — LOW (ref 8.4–10.5)
CALCIUM SERPL-MCNC: 8.2 MG/DL — LOW (ref 8.4–10.5)
CALCIUM SERPL-MCNC: 8.6 MG/DL — SIGNIFICANT CHANGE UP (ref 8.4–10.5)
CALCIUM SERPL-MCNC: 8.6 MG/DL — SIGNIFICANT CHANGE UP (ref 8.4–10.5)
CALCIUM SERPL-MCNC: 8.9 MG/DL — SIGNIFICANT CHANGE UP (ref 8.4–10.5)
CHLORIDE SERPL-SCNC: 93 MMOL/L — LOW (ref 98–107)
CHLORIDE SERPL-SCNC: 93 MMOL/L — LOW (ref 98–107)
CHLORIDE SERPL-SCNC: 94 MMOL/L — LOW (ref 98–107)
CHLORIDE SERPL-SCNC: 94 MMOL/L — LOW (ref 98–107)
CHLORIDE SERPL-SCNC: 95 MMOL/L — LOW (ref 98–107)
CHLORIDE SERPL-SCNC: 98 MMOL/L — SIGNIFICANT CHANGE UP (ref 98–107)
CO2 SERPL-SCNC: 20 MMOL/L — LOW (ref 22–31)
CO2 SERPL-SCNC: 21 MMOL/L — LOW (ref 22–31)
CO2 SERPL-SCNC: 21 MMOL/L — LOW (ref 22–31)
CO2 SERPL-SCNC: 22 MMOL/L — SIGNIFICANT CHANGE UP (ref 22–31)
CO2 SERPL-SCNC: 23 MMOL/L — SIGNIFICANT CHANGE UP (ref 22–31)
CO2 SERPL-SCNC: 25 MMOL/L — SIGNIFICANT CHANGE UP (ref 22–31)
COLOR FLD: YELLOW
CORTIS SERPL-MCNC: 27 UG/DL — HIGH
CREAT SERPL-MCNC: 1.94 MG/DL — HIGH (ref 0.5–1.3)
CREAT SERPL-MCNC: 2.69 MG/DL — HIGH (ref 0.5–1.3)
CREAT SERPL-MCNC: 2.75 MG/DL — HIGH (ref 0.5–1.3)
CREAT SERPL-MCNC: 3.52 MG/DL — HIGH (ref 0.5–1.3)
CREAT SERPL-MCNC: 3.6 MG/DL — HIGH (ref 0.5–1.3)
CREAT SERPL-MCNC: 4.39 MG/DL — HIGH (ref 0.5–1.3)
CULTURE RESULTS: ABNORMAL
CULTURE RESULTS: SIGNIFICANT CHANGE UP
DEPRECATED M TB RPOB XXX QL NAA+PROBE: SIGNIFICANT CHANGE UP
EGFR: 14 ML/MIN/1.73M2 — LOW
EGFR: 14 ML/MIN/1.73M2 — LOW
EGFR: 17 ML/MIN/1.73M2 — LOW
EGFR: 17 ML/MIN/1.73M2 — LOW
EGFR: 18 ML/MIN/1.73M2 — LOW
EGFR: 18 ML/MIN/1.73M2 — LOW
EGFR: 24 ML/MIN/1.73M2 — LOW
EGFR: 24 ML/MIN/1.73M2 — LOW
EGFR: 25 ML/MIN/1.73M2 — LOW
EGFR: 25 ML/MIN/1.73M2 — LOW
EGFR: 37 ML/MIN/1.73M2 — LOW
EGFR: 37 ML/MIN/1.73M2 — LOW
ELLIPTOCYTES BLD QL SMEAR: SLIGHT — SIGNIFICANT CHANGE UP
EOSINOPHIL # BLD AUTO: 0 K/UL — SIGNIFICANT CHANGE UP (ref 0–0.5)
EOSINOPHIL # BLD AUTO: 0.01 K/UL — SIGNIFICANT CHANGE UP (ref 0–0.5)
EOSINOPHIL # BLD AUTO: 0.03 K/UL — SIGNIFICANT CHANGE UP (ref 0–0.5)
EOSINOPHIL # BLD AUTO: 0.07 K/UL — SIGNIFICANT CHANGE UP (ref 0–0.5)
EOSINOPHIL # BLD AUTO: 0.12 K/UL — SIGNIFICANT CHANGE UP (ref 0–0.5)
EOSINOPHIL # BLD AUTO: 0.41 K/UL — SIGNIFICANT CHANGE UP (ref 0–0.5)
EOSINOPHIL NFR BLD AUTO: 0 % — SIGNIFICANT CHANGE UP (ref 0–6)
EOSINOPHIL NFR BLD AUTO: 0.1 % — SIGNIFICANT CHANGE UP (ref 0–6)
EOSINOPHIL NFR BLD AUTO: 0.2 % — SIGNIFICANT CHANGE UP (ref 0–6)
EOSINOPHIL NFR BLD AUTO: 0.7 % — SIGNIFICANT CHANGE UP (ref 0–6)
EOSINOPHIL NFR BLD AUTO: 0.8 % — SIGNIFICANT CHANGE UP (ref 0–6)
EOSINOPHIL NFR BLD AUTO: 4.4 % — SIGNIFICANT CHANGE UP (ref 0–6)
FLUID INTAKE SUBSTANCE CLASS: SIGNIFICANT CHANGE UP
GIANT PLATELETS BLD QL SMEAR: PRESENT — SIGNIFICANT CHANGE UP
GIANT PLATELETS BLD QL SMEAR: PRESENT — SIGNIFICANT CHANGE UP
GLUCOSE BLDC GLUCOMTR-MCNC: 101 MG/DL — HIGH (ref 70–99)
GLUCOSE BLDC GLUCOMTR-MCNC: 152 MG/DL — HIGH (ref 70–99)
GLUCOSE BLDC GLUCOMTR-MCNC: 161 MG/DL — HIGH (ref 70–99)
GLUCOSE BLDC GLUCOMTR-MCNC: 185 MG/DL — HIGH (ref 70–99)
GLUCOSE BLDC GLUCOMTR-MCNC: 188 MG/DL — HIGH (ref 70–99)
GLUCOSE BLDC GLUCOMTR-MCNC: 196 MG/DL — HIGH (ref 70–99)
GLUCOSE BLDC GLUCOMTR-MCNC: 199 MG/DL — HIGH (ref 70–99)
GLUCOSE BLDC GLUCOMTR-MCNC: 201 MG/DL — HIGH (ref 70–99)
GLUCOSE BLDC GLUCOMTR-MCNC: 207 MG/DL — HIGH (ref 70–99)
GLUCOSE BLDC GLUCOMTR-MCNC: 211 MG/DL — HIGH (ref 70–99)
GLUCOSE BLDC GLUCOMTR-MCNC: 221 MG/DL — HIGH (ref 70–99)
GLUCOSE BLDC GLUCOMTR-MCNC: 222 MG/DL — HIGH (ref 70–99)
GLUCOSE BLDC GLUCOMTR-MCNC: 227 MG/DL — HIGH (ref 70–99)
GLUCOSE BLDC GLUCOMTR-MCNC: 232 MG/DL — HIGH (ref 70–99)
GLUCOSE BLDC GLUCOMTR-MCNC: 236 MG/DL — HIGH (ref 70–99)
GLUCOSE BLDC GLUCOMTR-MCNC: 246 MG/DL — HIGH (ref 70–99)
GLUCOSE BLDC GLUCOMTR-MCNC: 255 MG/DL — HIGH (ref 70–99)
GLUCOSE BLDC GLUCOMTR-MCNC: 267 MG/DL — HIGH (ref 70–99)
GLUCOSE BLDC GLUCOMTR-MCNC: 269 MG/DL — HIGH (ref 70–99)
GLUCOSE BLDC GLUCOMTR-MCNC: 276 MG/DL — HIGH (ref 70–99)
GLUCOSE BLDC GLUCOMTR-MCNC: 281 MG/DL — HIGH (ref 70–99)
GLUCOSE BLDC GLUCOMTR-MCNC: 295 MG/DL — HIGH (ref 70–99)
GLUCOSE BLDC GLUCOMTR-MCNC: 305 MG/DL — HIGH (ref 70–99)
GLUCOSE BLDC GLUCOMTR-MCNC: 92 MG/DL — SIGNIFICANT CHANGE UP (ref 70–99)
GLUCOSE SERPL-MCNC: 209 MG/DL — HIGH (ref 70–99)
GLUCOSE SERPL-MCNC: 213 MG/DL — HIGH (ref 70–99)
GLUCOSE SERPL-MCNC: 252 MG/DL — HIGH (ref 70–99)
GLUCOSE SERPL-MCNC: 268 MG/DL — HIGH (ref 70–99)
GLUCOSE SERPL-MCNC: 271 MG/DL — HIGH (ref 70–99)
GLUCOSE SERPL-MCNC: 77 MG/DL — SIGNIFICANT CHANGE UP (ref 70–99)
GRAM STN FLD: ABNORMAL
HCT VFR BLD CALC: 23.6 % — LOW (ref 39–50)
HCT VFR BLD CALC: 25.1 % — LOW (ref 39–50)
HCT VFR BLD CALC: 25.9 % — LOW (ref 39–50)
HCT VFR BLD CALC: 26.2 % — LOW (ref 39–50)
HCT VFR BLD CALC: 26.7 % — LOW (ref 39–50)
HCT VFR BLD CALC: 29 % — LOW (ref 39–50)
HGB BLD-MCNC: 7.6 G/DL — LOW (ref 13–17)
HGB BLD-MCNC: 7.7 G/DL — LOW (ref 13–17)
HGB BLD-MCNC: 8.4 G/DL — LOW (ref 13–17)
HGB BLD-MCNC: 8.4 G/DL — LOW (ref 13–17)
HGB BLD-MCNC: 8.5 G/DL — LOW (ref 13–17)
HGB BLD-MCNC: 9.1 G/DL — LOW (ref 13–17)
HYPOCHROMIA BLD QL: SLIGHT — SIGNIFICANT CHANGE UP
HYPOCHROMIA BLD QL: SLIGHT — SIGNIFICANT CHANGE UP
IANC: 10.08 K/UL — HIGH (ref 1.8–7.4)
IANC: 12.16 K/UL — HIGH (ref 1.8–7.4)
IANC: 13.96 K/UL — HIGH (ref 1.8–7.4)
IANC: 8.03 K/UL — HIGH (ref 1.8–7.4)
IANC: 9.12 K/UL — HIGH (ref 1.8–7.4)
IANC: 9.32 K/UL — HIGH (ref 1.8–7.4)
IMM GRANULOCYTES NFR BLD AUTO: 0.5 % — SIGNIFICANT CHANGE UP (ref 0–0.9)
IMM GRANULOCYTES NFR BLD AUTO: 0.6 % — SIGNIFICANT CHANGE UP (ref 0–0.9)
IMM GRANULOCYTES NFR BLD AUTO: 0.6 % — SIGNIFICANT CHANGE UP (ref 0–0.9)
IMM GRANULOCYTES NFR BLD AUTO: 0.7 % — SIGNIFICANT CHANGE UP (ref 0–0.9)
INR BLD: 1.15 RATIO — SIGNIFICANT CHANGE UP (ref 0.85–1.16)
INR BLD: 1.15 RATIO — SIGNIFICANT CHANGE UP (ref 0.85–1.16)
INR BLD: 1.19 RATIO — HIGH (ref 0.85–1.16)
INR BLD: 1.23 RATIO — HIGH (ref 0.85–1.16)
INR BLD: 1.24 RATIO — HIGH (ref 0.85–1.16)
INR BLD: 1.24 RATIO — HIGH (ref 0.85–1.16)
LACTATE SERPL-SCNC: 1.2 MMOL/L — SIGNIFICANT CHANGE UP (ref 0.5–2)
LACTATE SERPL-SCNC: 1.2 MMOL/L — SIGNIFICANT CHANGE UP (ref 0.5–2)
LACTATE SERPL-SCNC: 1.4 MMOL/L — SIGNIFICANT CHANGE UP (ref 0.5–2)
LYMPHOCYTES # BLD AUTO: 0.33 K/UL — LOW (ref 1–3.3)
LYMPHOCYTES # BLD AUTO: 0.38 K/UL — LOW (ref 1–3.3)
LYMPHOCYTES # BLD AUTO: 0.53 K/UL — LOW (ref 1–3.3)
LYMPHOCYTES # BLD AUTO: 0.55 K/UL — LOW (ref 1–3.3)
LYMPHOCYTES # BLD AUTO: 0.57 K/UL — LOW (ref 1–3.3)
LYMPHOCYTES # BLD AUTO: 0.62 K/UL — LOW (ref 1–3.3)
LYMPHOCYTES # BLD AUTO: 2.5 % — LOW (ref 13–44)
LYMPHOCYTES # BLD AUTO: 3.5 % — LOW (ref 13–44)
LYMPHOCYTES # BLD AUTO: 4.5 % — LOW (ref 13–44)
LYMPHOCYTES # BLD AUTO: 4.9 % — LOW (ref 13–44)
LYMPHOCYTES # BLD AUTO: 5.2 % — LOW (ref 13–44)
LYMPHOCYTES # BLD AUTO: 5.2 % — LOW (ref 13–44)
LYMPHOCYTES # FLD: 3 % — SIGNIFICANT CHANGE UP
M TB CMPLX DNA SPT/BRO QL NAA+PROBE: SIGNIFICANT CHANGE UP
M TB CMPLX DNA SPT/BRO QL NAA+PROBE: SIGNIFICANT CHANGE UP
MACROCYTES BLD QL: SLIGHT — SIGNIFICANT CHANGE UP
MAGNESIUM SERPL-MCNC: 2 MG/DL — SIGNIFICANT CHANGE UP (ref 1.6–2.6)
MAGNESIUM SERPL-MCNC: 2.1 MG/DL — SIGNIFICANT CHANGE UP (ref 1.6–2.6)
MAGNESIUM SERPL-MCNC: 2.1 MG/DL — SIGNIFICANT CHANGE UP (ref 1.6–2.6)
MAGNESIUM SERPL-MCNC: 2.2 MG/DL — SIGNIFICANT CHANGE UP (ref 1.6–2.6)
MCHC RBC-ENTMCNC: 26.2 PG — LOW (ref 27–34)
MCHC RBC-ENTMCNC: 26.8 PG — LOW (ref 27–34)
MCHC RBC-ENTMCNC: 26.8 PG — LOW (ref 27–34)
MCHC RBC-ENTMCNC: 26.9 PG — LOW (ref 27–34)
MCHC RBC-ENTMCNC: 26.9 PG — LOW (ref 27–34)
MCHC RBC-ENTMCNC: 27.2 PG — SIGNIFICANT CHANGE UP (ref 27–34)
MCHC RBC-ENTMCNC: 30.7 G/DL — LOW (ref 32–36)
MCHC RBC-ENTMCNC: 31.4 G/DL — LOW (ref 32–36)
MCHC RBC-ENTMCNC: 31.8 G/DL — LOW (ref 32–36)
MCHC RBC-ENTMCNC: 32.1 G/DL — SIGNIFICANT CHANGE UP (ref 32–36)
MCHC RBC-ENTMCNC: 32.2 G/DL — SIGNIFICANT CHANGE UP (ref 32–36)
MCHC RBC-ENTMCNC: 32.4 G/DL — SIGNIFICANT CHANGE UP (ref 32–36)
MCV RBC AUTO: 82.5 FL — SIGNIFICANT CHANGE UP (ref 80–100)
MCV RBC AUTO: 84 FL — SIGNIFICANT CHANGE UP (ref 80–100)
MCV RBC AUTO: 84.2 FL — SIGNIFICANT CHANGE UP (ref 80–100)
MCV RBC AUTO: 84.6 FL — SIGNIFICANT CHANGE UP (ref 80–100)
MCV RBC AUTO: 85.4 FL — SIGNIFICANT CHANGE UP (ref 80–100)
MCV RBC AUTO: 85.8 FL — SIGNIFICANT CHANGE UP (ref 80–100)
METHOD TYPE: SIGNIFICANT CHANGE UP
METHOD TYPE: SIGNIFICANT CHANGE UP
MONOCYTES # BLD AUTO: 0.5 K/UL — SIGNIFICANT CHANGE UP (ref 0–0.9)
MONOCYTES # BLD AUTO: 0.59 K/UL — SIGNIFICANT CHANGE UP (ref 0–0.9)
MONOCYTES # BLD AUTO: 0.59 K/UL — SIGNIFICANT CHANGE UP (ref 0–0.9)
MONOCYTES # BLD AUTO: 0.62 K/UL — SIGNIFICANT CHANGE UP (ref 0–0.9)
MONOCYTES # BLD AUTO: 0.8 K/UL — SIGNIFICANT CHANGE UP (ref 0–0.9)
MONOCYTES # BLD AUTO: 0.87 K/UL — SIGNIFICANT CHANGE UP (ref 0–0.9)
MONOCYTES NFR BLD AUTO: 3.9 % — SIGNIFICANT CHANGE UP (ref 2–14)
MONOCYTES NFR BLD AUTO: 5.3 % — SIGNIFICANT CHANGE UP (ref 2–14)
MONOCYTES NFR BLD AUTO: 5.6 % — SIGNIFICANT CHANGE UP (ref 2–14)
MONOCYTES NFR BLD AUTO: 6.1 % — SIGNIFICANT CHANGE UP (ref 2–14)
MONOCYTES NFR BLD AUTO: 6.3 % — SIGNIFICANT CHANGE UP (ref 2–14)
MONOCYTES NFR BLD AUTO: 6.9 % — SIGNIFICANT CHANGE UP (ref 2–14)
MONOS+MACROS # FLD: 10 % — SIGNIFICANT CHANGE UP
MRSA PCR RESULT.: SIGNIFICANT CHANGE UP
MTB RIF RES GENE SPT NAA+PROBE: SIGNIFICANT CHANGE UP
NEUTROPHILS # BLD AUTO: 10.08 K/UL — HIGH (ref 1.8–7.4)
NEUTROPHILS # BLD AUTO: 12.16 K/UL — HIGH (ref 1.8–7.4)
NEUTROPHILS # BLD AUTO: 13.96 K/UL — HIGH (ref 1.8–7.4)
NEUTROPHILS # BLD AUTO: 8.12 K/UL — HIGH (ref 1.8–7.4)
NEUTROPHILS # BLD AUTO: 9.03 K/UL — HIGH (ref 1.8–7.4)
NEUTROPHILS # BLD AUTO: 9.32 K/UL — HIGH (ref 1.8–7.4)
NEUTROPHILS NFR BLD AUTO: 86.8 % — HIGH (ref 43–77)
NEUTROPHILS NFR BLD AUTO: 87 % — HIGH (ref 43–77)
NEUTROPHILS NFR BLD AUTO: 87.5 % — HIGH (ref 43–77)
NEUTROPHILS NFR BLD AUTO: 87.7 % — HIGH (ref 43–77)
NEUTROPHILS NFR BLD AUTO: 88.2 % — HIGH (ref 43–77)
NEUTROPHILS NFR BLD AUTO: 92.1 % — HIGH (ref 43–77)
NEUTROPHILS-BODY FLUID: 87 % — SIGNIFICANT CHANGE UP
NEUTS BAND # BLD: 1.7 % — SIGNIFICANT CHANGE UP (ref 0–6)
NEUTS BAND NFR BLD: 1.7 % — SIGNIFICANT CHANGE UP (ref 0–6)
NIGHT BLUE STAIN TISS: ABNORMAL
NIGHT BLUE STAIN TISS: SIGNIFICANT CHANGE UP
NRBC # BLD AUTO: 0 K/UL — SIGNIFICANT CHANGE UP (ref 0–0)
NRBC # BLD AUTO: 0 K/UL — SIGNIFICANT CHANGE UP (ref 0–0)
NRBC # BLD AUTO: 0.02 K/UL — HIGH (ref 0–0)
NRBC # BLD AUTO: 0.03 K/UL — HIGH (ref 0–0)
NRBC # BLD: 0 /100 WBCS — SIGNIFICANT CHANGE UP (ref 0–0)
NRBC # FLD: 0 K/UL — SIGNIFICANT CHANGE UP (ref 0–0)
NRBC # FLD: 0 K/UL — SIGNIFICANT CHANGE UP (ref 0–0)
NRBC # FLD: 0.02 K/UL — HIGH (ref 0–0)
NRBC # FLD: 0.03 K/UL — HIGH (ref 0–0)
NRBC BLD-RTO: 0 /100 WBCS — SIGNIFICANT CHANGE UP (ref 0–0)
ORGANISM # SPEC MICROSCOPIC CNT: ABNORMAL
OVALOCYTES BLD QL SMEAR: SIGNIFICANT CHANGE UP
OVALOCYTES BLD QL SMEAR: SLIGHT — SIGNIFICANT CHANGE UP
PHOSPHATE SERPL-MCNC: 1.8 MG/DL — LOW (ref 2.5–4.5)
PHOSPHATE SERPL-MCNC: 1.9 MG/DL — LOW (ref 2.5–4.5)
PHOSPHATE SERPL-MCNC: 2.6 MG/DL — SIGNIFICANT CHANGE UP (ref 2.5–4.5)
PHOSPHATE SERPL-MCNC: 3.3 MG/DL — SIGNIFICANT CHANGE UP (ref 2.5–4.5)
PHOSPHATE SERPL-MCNC: 3.6 MG/DL — SIGNIFICANT CHANGE UP (ref 2.5–4.5)
PHOSPHATE SERPL-MCNC: 4.8 MG/DL — HIGH (ref 2.5–4.5)
PLAT MORPH BLD: ABNORMAL
PLAT MORPH BLD: NORMAL — SIGNIFICANT CHANGE UP
PLATELET # BLD AUTO: 277 K/UL — SIGNIFICANT CHANGE UP (ref 150–400)
PLATELET # BLD AUTO: 278 K/UL — SIGNIFICANT CHANGE UP (ref 150–400)
PLATELET # BLD AUTO: 286 K/UL — SIGNIFICANT CHANGE UP (ref 150–400)
PLATELET # BLD AUTO: 287 K/UL — SIGNIFICANT CHANGE UP (ref 150–400)
PLATELET # BLD AUTO: 302 K/UL — SIGNIFICANT CHANGE UP (ref 150–400)
PLATELET # BLD AUTO: 306 K/UL — SIGNIFICANT CHANGE UP (ref 150–400)
PLATELET COUNT - ESTIMATE: NORMAL — SIGNIFICANT CHANGE UP
PLATELET COUNT - ESTIMATE: NORMAL — SIGNIFICANT CHANGE UP
POIKILOCYTOSIS BLD QL AUTO: SIGNIFICANT CHANGE UP
POIKILOCYTOSIS BLD QL AUTO: SLIGHT — SIGNIFICANT CHANGE UP
POLYCHROMASIA BLD QL SMEAR: SLIGHT — SIGNIFICANT CHANGE UP
POTASSIUM SERPL-MCNC: 3.5 MMOL/L — SIGNIFICANT CHANGE UP (ref 3.5–5.3)
POTASSIUM SERPL-MCNC: 3.8 MMOL/L — SIGNIFICANT CHANGE UP (ref 3.5–5.3)
POTASSIUM SERPL-MCNC: 4 MMOL/L — SIGNIFICANT CHANGE UP (ref 3.5–5.3)
POTASSIUM SERPL-MCNC: 4 MMOL/L — SIGNIFICANT CHANGE UP (ref 3.5–5.3)
POTASSIUM SERPL-MCNC: 4.3 MMOL/L — SIGNIFICANT CHANGE UP (ref 3.5–5.3)
POTASSIUM SERPL-MCNC: 4.6 MMOL/L — SIGNIFICANT CHANGE UP (ref 3.5–5.3)
POTASSIUM SERPL-SCNC: 3.5 MMOL/L — SIGNIFICANT CHANGE UP (ref 3.5–5.3)
POTASSIUM SERPL-SCNC: 3.8 MMOL/L — SIGNIFICANT CHANGE UP (ref 3.5–5.3)
POTASSIUM SERPL-SCNC: 4 MMOL/L — SIGNIFICANT CHANGE UP (ref 3.5–5.3)
POTASSIUM SERPL-SCNC: 4 MMOL/L — SIGNIFICANT CHANGE UP (ref 3.5–5.3)
POTASSIUM SERPL-SCNC: 4.3 MMOL/L — SIGNIFICANT CHANGE UP (ref 3.5–5.3)
POTASSIUM SERPL-SCNC: 4.6 MMOL/L — SIGNIFICANT CHANGE UP (ref 3.5–5.3)
PROT SERPL-MCNC: 6.3 G/DL — SIGNIFICANT CHANGE UP (ref 6–8.3)
PROT SERPL-MCNC: 6.4 G/DL — SIGNIFICANT CHANGE UP (ref 6–8.3)
PROT SERPL-MCNC: 6.7 G/DL — SIGNIFICANT CHANGE UP (ref 6–8.3)
PROT SERPL-MCNC: 6.8 G/DL — SIGNIFICANT CHANGE UP (ref 6–8.3)
PROT SERPL-MCNC: 6.9 G/DL — SIGNIFICANT CHANGE UP (ref 6–8.3)
PROT SERPL-MCNC: 7 G/DL — SIGNIFICANT CHANGE UP (ref 6–8.3)
PROTHROM AB SERPL-ACNC: 13.3 SEC — SIGNIFICANT CHANGE UP (ref 9.9–13.4)
PROTHROM AB SERPL-ACNC: 13.7 SEC — HIGH (ref 9.9–13.4)
PROTHROM AB SERPL-ACNC: 14.1 SEC — HIGH (ref 9.9–13.4)
PROTHROM AB SERPL-ACNC: 14.3 SEC — HIGH (ref 9.9–13.4)
PROTHROM AB SERPL-ACNC: 14.6 SEC — HIGH (ref 9.9–13.4)
PROTHROM AB SERPL-ACNC: 14.7 SEC — HIGH (ref 9.9–13.4)
RBC # BLD: 2.79 M/UL — LOW (ref 4.2–5.8)
RBC # BLD: 2.94 M/UL — LOW (ref 4.2–5.8)
RBC # BLD: 3.12 M/UL — LOW (ref 4.2–5.8)
RBC # BLD: 3.14 M/UL — LOW (ref 4.2–5.8)
RBC # BLD: 3.17 M/UL — LOW (ref 4.2–5.8)
RBC # BLD: 3.38 M/UL — LOW (ref 4.2–5.8)
RBC # FLD: 18 % — HIGH (ref 10.3–14.5)
RBC # FLD: 18.3 % — HIGH (ref 10.3–14.5)
RBC # FLD: 18.4 % — HIGH (ref 10.3–14.5)
RBC # FLD: 18.5 % — HIGH (ref 10.3–14.5)
RBC # FLD: 18.5 % — HIGH (ref 10.3–14.5)
RBC # FLD: 18.8 % — HIGH (ref 10.3–14.5)
RBC BLD AUTO: ABNORMAL
RBC BLD AUTO: ABNORMAL
RCV VOL RI: SIGNIFICANT CHANGE UP CELLS/UL (ref 0–5)
RH IG SCN BLD-IMP: POSITIVE — SIGNIFICANT CHANGE UP
S AUREUS DNA NOSE QL NAA+PROBE: DETECTED
SCHISTOCYTES BLD QL AUTO: SLIGHT — SIGNIFICANT CHANGE UP
SMUDGE CELLS # BLD: PRESENT — SIGNIFICANT CHANGE UP
SODIUM SERPL-SCNC: 130 MMOL/L — LOW (ref 135–145)
SODIUM SERPL-SCNC: 132 MMOL/L — LOW (ref 135–145)
SODIUM SERPL-SCNC: 133 MMOL/L — LOW (ref 135–145)
SODIUM SERPL-SCNC: 134 MMOL/L — LOW (ref 135–145)
SODIUM SERPL-SCNC: 134 MMOL/L — LOW (ref 135–145)
SODIUM SERPL-SCNC: 136 MMOL/L — SIGNIFICANT CHANGE UP (ref 135–145)
SPECIMEN SOURCE FLD: SIGNIFICANT CHANGE UP
SPECIMEN SOURCE: SIGNIFICANT CHANGE UP
TARGETS BLD QL SMEAR: SLIGHT — SIGNIFICANT CHANGE UP
TARGETS BLD QL SMEAR: SLIGHT — SIGNIFICANT CHANGE UP
TOTAL CELLS COUNTED, BODY FLUID: 100 CELLS — SIGNIFICANT CHANGE UP
TOTAL NUCLEATED CELL COUNT, BODY FLUID: SIGNIFICANT CHANGE UP CELLS/UL (ref 0–5)
TUBE TYPE: SIGNIFICANT CHANGE UP
WBC # BLD: 10.18 K/UL — SIGNIFICANT CHANGE UP (ref 3.8–10.5)
WBC # BLD: 10.61 K/UL — HIGH (ref 3.8–10.5)
WBC # BLD: 11.53 K/UL — HIGH (ref 3.8–10.5)
WBC # BLD: 13.78 K/UL — HIGH (ref 3.8–10.5)
WBC # BLD: 15.15 K/UL — HIGH (ref 3.8–10.5)
WBC # BLD: 9.36 K/UL — SIGNIFICANT CHANGE UP (ref 3.8–10.5)
WBC # FLD AUTO: 10.18 K/UL — SIGNIFICANT CHANGE UP (ref 3.8–10.5)
WBC # FLD AUTO: 10.61 K/UL — HIGH (ref 3.8–10.5)
WBC # FLD AUTO: 11.53 K/UL — HIGH (ref 3.8–10.5)
WBC # FLD AUTO: 13.78 K/UL — HIGH (ref 3.8–10.5)
WBC # FLD AUTO: 15.15 K/UL — HIGH (ref 3.8–10.5)
WBC # FLD AUTO: 9.36 K/UL — SIGNIFICANT CHANGE UP (ref 3.8–10.5)

## 2025-01-01 PROCEDURE — 99291 CRITICAL CARE FIRST HOUR: CPT | Mod: GC

## 2025-01-01 PROCEDURE — 31622 DX BRONCHOSCOPE/WASH: CPT | Mod: GC

## 2025-01-01 PROCEDURE — 99291 CRITICAL CARE FIRST HOUR: CPT | Mod: GC,25

## 2025-01-01 PROCEDURE — 71045 X-RAY EXAM CHEST 1 VIEW: CPT | Mod: 26

## 2025-01-01 PROCEDURE — 99233 SBSQ HOSP IP/OBS HIGH 50: CPT

## 2025-01-01 PROCEDURE — 31624 DX BRONCHOSCOPE/LAVAGE: CPT | Mod: GC

## 2025-01-01 PROCEDURE — 93308 TTE F-UP OR LMTD: CPT | Mod: 26,GC

## 2025-01-01 PROCEDURE — 76536 US EXAM OF HEAD AND NECK: CPT | Mod: 26,GC

## 2025-01-01 PROCEDURE — 99291 CRITICAL CARE FIRST HOUR: CPT

## 2025-01-01 PROCEDURE — 99232 SBSQ HOSP IP/OBS MODERATE 35: CPT

## 2025-01-01 PROCEDURE — 71045 X-RAY EXAM CHEST 1 VIEW: CPT | Mod: 26,77

## 2025-01-01 PROCEDURE — 31645 BRNCHSC W/THER ASPIR 1ST: CPT | Mod: GC

## 2025-01-01 PROCEDURE — 99497 ADVNCD CARE PLAN 30 MIN: CPT | Mod: 25

## 2025-01-01 PROCEDURE — 31600 PLANNED TRACHEOSTOMY: CPT | Mod: GC

## 2025-01-01 PROCEDURE — 76604 US EXAM CHEST: CPT | Mod: 26,GC

## 2025-01-01 PROCEDURE — 92950 HEART/LUNG RESUSCITATION CPR: CPT | Mod: GC

## 2025-01-01 RX ORDER — MIDAZOLAM IN 0.9 % SOD.CHLORID 1 MG/ML
2 PLASTIC BAG, INJECTION (ML) INTRAVENOUS ONCE
Refills: 0 | Status: DISCONTINUED | OUTPATIENT
Start: 2025-01-01 | End: 2025-01-01

## 2025-01-01 RX ORDER — MIDODRINE HYDROCHLORIDE 5 MG/1
20 TABLET ORAL EVERY 8 HOURS
Refills: 0 | Status: DISCONTINUED | OUTPATIENT
Start: 2025-01-01 | End: 2025-01-01

## 2025-01-01 RX ORDER — PROPOFOL 10 MG/ML
15 INJECTION, EMULSION INTRAVENOUS
Qty: 500 | Refills: 0 | Status: DISCONTINUED | OUTPATIENT
Start: 2025-01-01 | End: 2025-01-01

## 2025-01-01 RX ORDER — PROPOFOL 10 MG/ML
15 INJECTION, EMULSION INTRAVENOUS ONCE
Refills: 0 | Status: DISCONTINUED | OUTPATIENT
Start: 2025-01-01 | End: 2025-01-01

## 2025-01-01 RX ORDER — VANCOMYCIN HCL IN 5 % DEXTROSE 1.5G/250ML
750 PLASTIC BAG, INJECTION (ML) INTRAVENOUS ONCE
Refills: 0 | Status: COMPLETED | OUTPATIENT
Start: 2025-01-01 | End: 2025-01-01

## 2025-01-01 RX ORDER — PIPERACILLIN-TAZO-DEXTROSE,ISO 3.375G/5
3.38 IV SOLUTION, PIGGYBACK PREMIX FROZEN(ML) INTRAVENOUS ONCE
Refills: 0 | Status: COMPLETED | OUTPATIENT
Start: 2025-01-01 | End: 2025-01-01

## 2025-01-01 RX ORDER — FENTANYL CITRATE-0.9 % NACL/PF 100MCG/2ML
200 SYRINGE (ML) INTRAVENOUS ONCE
Refills: 0 | Status: DISCONTINUED | OUTPATIENT
Start: 2025-01-01 | End: 2025-01-01

## 2025-01-01 RX ORDER — HYDROMORPHONE/SOD CHLOR,ISO/PF 2 MG/10 ML
0.25 SYRINGE (ML) INJECTION
Refills: 0 | Status: DISCONTINUED | OUTPATIENT
Start: 2025-01-01 | End: 2025-01-01

## 2025-01-01 RX ORDER — PIPERACILLIN-TAZO-DEXTROSE,ISO 3.375G/5
3.38 IV SOLUTION, PIGGYBACK PREMIX FROZEN(ML) INTRAVENOUS EVERY 12 HOURS
Refills: 0 | Status: DISCONTINUED | OUTPATIENT
Start: 2025-01-01 | End: 2025-01-01

## 2025-01-01 RX ORDER — MEROPENEM 1 G/30ML
500 INJECTION INTRAVENOUS EVERY 24 HOURS
Refills: 0 | Status: DISCONTINUED | OUTPATIENT
Start: 2025-01-01 | End: 2025-01-01

## 2025-01-01 RX ORDER — CALCIUM GLUCONATE 20 MG/ML
1 INJECTION, SOLUTION INTRAVENOUS ONCE
Refills: 0 | Status: COMPLETED | OUTPATIENT
Start: 2025-01-01 | End: 2025-01-01

## 2025-01-01 RX ORDER — PROPOFOL 10 MG/ML
50 INJECTION, EMULSION INTRAVENOUS
Qty: 1000 | Refills: 0 | Status: DISCONTINUED | OUTPATIENT
Start: 2025-01-01 | End: 2025-01-01

## 2025-01-01 RX ORDER — PROPOFOL 10 MG/ML
15 INJECTION, EMULSION INTRAVENOUS
Qty: 1000 | Refills: 0 | Status: DISCONTINUED | OUTPATIENT
Start: 2025-01-01 | End: 2025-01-01

## 2025-01-01 RX ORDER — POTASSIUM PHOSPHATE, MONOBASIC POTASSIUM PHOSPHATE, DIBASIC INJECTION, 236; 224 MG/ML; MG/ML
15 SOLUTION, CONCENTRATE INTRAVENOUS ONCE
Refills: 0 | Status: COMPLETED | OUTPATIENT
Start: 2025-01-01 | End: 2025-01-01

## 2025-01-01 RX ORDER — MIDAZOLAM IN 0.9 % SOD.CHLORID 1 MG/ML
4 PLASTIC BAG, INJECTION (ML) INTRAVENOUS ONCE
Refills: 0 | Status: DISCONTINUED | OUTPATIENT
Start: 2025-01-01 | End: 2025-01-01

## 2025-01-01 RX ORDER — HEPARIN SODIUM 1000 [USP'U]/ML
5000 INJECTION INTRAVENOUS; SUBCUTANEOUS EVERY 12 HOURS
Refills: 0 | Status: DISCONTINUED | OUTPATIENT
Start: 2025-01-01 | End: 2025-01-01

## 2025-01-01 RX ORDER — MEROPENEM 1 G/30ML
INJECTION INTRAVENOUS
Refills: 0 | Status: DISCONTINUED | OUTPATIENT
Start: 2025-01-01 | End: 2025-01-01

## 2025-01-01 RX ORDER — LORAZEPAM 4 MG/ML
0.25 VIAL (ML) INJECTION
Refills: 0 | Status: DISCONTINUED | OUTPATIENT
Start: 2025-01-01 | End: 2025-01-01

## 2025-01-01 RX ORDER — CISATRACURIUM BESYLATE 10 MG/5ML
20 INJECTION, SOLUTION INTRAVENOUS ONCE
Refills: 0 | Status: COMPLETED | OUTPATIENT
Start: 2025-01-01 | End: 2025-01-01

## 2025-01-01 RX ORDER — MEROPENEM 1 G/30ML
500 INJECTION INTRAVENOUS ONCE
Refills: 0 | Status: COMPLETED | OUTPATIENT
Start: 2025-01-01 | End: 2025-01-01

## 2025-01-01 RX ORDER — LIDOCAINE HCL/EPINEPHRINE/PF 1 %-1:200K
5 AMPUL (ML) INJECTION ONCE
Refills: 0 | Status: COMPLETED | OUTPATIENT
Start: 2025-01-01 | End: 2025-01-01

## 2025-01-01 RX ORDER — DROXIDOPA 300 MG/1
600 CAPSULE ORAL EVERY 8 HOURS
Refills: 0 | Status: DISCONTINUED | OUTPATIENT
Start: 2025-01-01 | End: 2025-01-01

## 2025-01-01 RX ORDER — NOREPINEPHRINE BITARTRATE 8 MG
0.05 SOLUTION INTRAVENOUS
Qty: 16 | Refills: 0 | Status: DISCONTINUED | OUTPATIENT
Start: 2025-01-01 | End: 2025-01-01

## 2025-01-01 RX ORDER — FENTANYL CITRATE-0.9 % NACL/PF 100MCG/2ML
100 SYRINGE (ML) INTRAVENOUS ONCE
Refills: 0 | Status: DISCONTINUED | OUTPATIENT
Start: 2025-01-01 | End: 2025-01-01

## 2025-01-01 RX ORDER — CALCIUM GLUCONATE 20 MG/ML
2 INJECTION, SOLUTION INTRAVENOUS ONCE
Refills: 0 | Status: COMPLETED | OUTPATIENT
Start: 2025-01-01 | End: 2025-01-01

## 2025-01-01 RX ORDER — SODIUM PHOSPHATE,DIBASIC DIHYD
15 POWDER (GRAM) MISCELLANEOUS ONCE
Refills: 0 | Status: COMPLETED | OUTPATIENT
Start: 2025-01-01 | End: 2025-01-01

## 2025-01-01 RX ORDER — ACETAMINOPHEN 500 MG/5ML
700 LIQUID (ML) ORAL ONCE
Refills: 0 | Status: COMPLETED | OUTPATIENT
Start: 2025-01-01 | End: 2025-01-01

## 2025-01-01 RX ORDER — DROXIDOPA 300 MG/1
400 CAPSULE ORAL EVERY 8 HOURS
Refills: 0 | Status: DISCONTINUED | OUTPATIENT
Start: 2025-01-01 | End: 2025-01-01

## 2025-01-01 RX ORDER — MAGNESIUM SULFATE 500 MG/ML
2 SYRINGE (ML) INJECTION ONCE
Refills: 0 | Status: DISCONTINUED | OUTPATIENT
Start: 2025-01-01 | End: 2025-01-01

## 2025-01-01 RX ADMIN — Medication 500 MILLIGRAM(S): at 11:43

## 2025-01-01 RX ADMIN — Medication 40 MILLIGRAM(S): at 16:53

## 2025-01-01 RX ADMIN — IPRATROPIUM BROMIDE AND ALBUTEROL SULFATE 3 MILLILITER(S): .5; 2.5 SOLUTION RESPIRATORY (INHALATION) at 16:29

## 2025-01-01 RX ADMIN — CALCIUM GLUCONATE 100 GRAM(S): 20 INJECTION, SOLUTION INTRAVENOUS at 02:32

## 2025-01-01 RX ADMIN — Medication 1 TABLET(S): at 11:45

## 2025-01-01 RX ADMIN — IPRATROPIUM BROMIDE AND ALBUTEROL SULFATE 3 MILLILITER(S): .5; 2.5 SOLUTION RESPIRATORY (INHALATION) at 03:24

## 2025-01-01 RX ADMIN — INSULIN LISPRO 1: 100 INJECTION, SOLUTION INTRAVENOUS; SUBCUTANEOUS at 05:33

## 2025-01-01 RX ADMIN — Medication 1 TABLET(S): at 11:42

## 2025-01-01 RX ADMIN — HEPARIN SODIUM 5000 UNIT(S): 1000 INJECTION INTRAVENOUS; SUBCUTANEOUS at 05:36

## 2025-01-01 RX ADMIN — Medication 81 MILLIGRAM(S): at 11:23

## 2025-01-01 RX ADMIN — Medication 650 MILLIGRAM(S): at 18:16

## 2025-01-01 RX ADMIN — INSULIN LISPRO 4: 100 INJECTION, SOLUTION INTRAVENOUS; SUBCUTANEOUS at 06:05

## 2025-01-01 RX ADMIN — Medication 500 MILLIGRAM(S): at 16:54

## 2025-01-01 RX ADMIN — CISATRACURIUM BESYLATE 20 MILLIGRAM(S): 10 INJECTION, SOLUTION INTRAVENOUS at 14:37

## 2025-01-01 RX ADMIN — Medication 2 DROP(S): at 11:13

## 2025-01-01 RX ADMIN — POLYETHYLENE GLYCOL 3350 17 GRAM(S): 17 POWDER, FOR SOLUTION ORAL at 17:25

## 2025-01-01 RX ADMIN — POLYETHYLENE GLYCOL 3350 17 GRAM(S): 17 POWDER, FOR SOLUTION ORAL at 19:06

## 2025-01-01 RX ADMIN — Medication 500 MILLIGRAM(S): at 13:13

## 2025-01-01 RX ADMIN — Medication 15 MILLILITER(S): at 18:45

## 2025-01-01 RX ADMIN — IPRATROPIUM BROMIDE AND ALBUTEROL SULFATE 3 MILLILITER(S): .5; 2.5 SOLUTION RESPIRATORY (INHALATION) at 03:43

## 2025-01-01 RX ADMIN — INSULIN LISPRO 2: 100 INJECTION, SOLUTION INTRAVENOUS; SUBCUTANEOUS at 12:05

## 2025-01-01 RX ADMIN — Medication 2 DROP(S): at 11:40

## 2025-01-01 RX ADMIN — IPRATROPIUM BROMIDE AND ALBUTEROL SULFATE 3 MILLILITER(S): .5; 2.5 SOLUTION RESPIRATORY (INHALATION) at 08:11

## 2025-01-01 RX ADMIN — DROXIDOPA 600 MILLIGRAM(S): 300 CAPSULE ORAL at 05:19

## 2025-01-01 RX ADMIN — POLYETHYLENE GLYCOL 3350 17 GRAM(S): 17 POWDER, FOR SOLUTION ORAL at 05:59

## 2025-01-01 RX ADMIN — Medication 1 TABLET(S): at 13:22

## 2025-01-01 RX ADMIN — NYSTATIN 1 APPLICATION(S): 100000 CREAM TOPICAL at 17:32

## 2025-01-01 RX ADMIN — Medication 15 MILLILITER(S): at 05:52

## 2025-01-01 RX ADMIN — Medication 1 APPLICATION(S): at 05:40

## 2025-01-01 RX ADMIN — Medication 2 TABLET(S): at 21:36

## 2025-01-01 RX ADMIN — Medication 2 DROP(S): at 11:22

## 2025-01-01 RX ADMIN — HEPARIN SODIUM 5000 UNIT(S): 1000 INJECTION INTRAVENOUS; SUBCUTANEOUS at 17:26

## 2025-01-01 RX ADMIN — NYSTATIN 1 APPLICATION(S): 100000 CREAM TOPICAL at 18:55

## 2025-01-01 RX ADMIN — Medication 650 MILLIGRAM(S): at 14:24

## 2025-01-01 RX ADMIN — Medication 15 MILLILITER(S): at 05:30

## 2025-01-01 RX ADMIN — POTASSIUM PHOSPHATE, MONOBASIC POTASSIUM PHOSPHATE, DIBASIC INJECTION, 62.5 MILLIMOLE(S): 236; 224 SOLUTION, CONCENTRATE INTRAVENOUS at 03:15

## 2025-01-01 RX ADMIN — MIDODRINE HYDROCHLORIDE 20 MILLIGRAM(S): 5 TABLET ORAL at 21:05

## 2025-01-01 RX ADMIN — MIDODRINE HYDROCHLORIDE 20 MILLIGRAM(S): 5 TABLET ORAL at 20:17

## 2025-01-01 RX ADMIN — INSULIN LISPRO 3: 100 INJECTION, SOLUTION INTRAVENOUS; SUBCUTANEOUS at 11:26

## 2025-01-01 RX ADMIN — NYSTATIN 1 APPLICATION(S): 100000 CREAM TOPICAL at 06:31

## 2025-01-01 RX ADMIN — HEPARIN SODIUM 5000 UNIT(S): 1000 INJECTION INTRAVENOUS; SUBCUTANEOUS at 05:31

## 2025-01-01 RX ADMIN — INSULIN LISPRO 2: 100 INJECTION, SOLUTION INTRAVENOUS; SUBCUTANEOUS at 19:14

## 2025-01-01 RX ADMIN — IPRATROPIUM BROMIDE AND ALBUTEROL SULFATE 3 MILLILITER(S): .5; 2.5 SOLUTION RESPIRATORY (INHALATION) at 03:42

## 2025-01-01 RX ADMIN — MIDODRINE HYDROCHLORIDE 20 MILLIGRAM(S): 5 TABLET ORAL at 11:44

## 2025-01-01 RX ADMIN — Medication 650 MILLIGRAM(S): at 04:54

## 2025-01-01 RX ADMIN — Medication 2 DROP(S): at 12:56

## 2025-01-01 RX ADMIN — IPRATROPIUM BROMIDE AND ALBUTEROL SULFATE 3 MILLILITER(S): .5; 2.5 SOLUTION RESPIRATORY (INHALATION) at 21:19

## 2025-01-01 RX ADMIN — IPRATROPIUM BROMIDE AND ALBUTEROL SULFATE 3 MILLILITER(S): .5; 2.5 SOLUTION RESPIRATORY (INHALATION) at 03:15

## 2025-01-01 RX ADMIN — Medication 650 MILLIGRAM(S): at 13:30

## 2025-01-01 RX ADMIN — NYSTATIN 1 APPLICATION(S): 100000 CREAM TOPICAL at 05:06

## 2025-01-01 RX ADMIN — IPRATROPIUM BROMIDE AND ALBUTEROL SULFATE 3 MILLILITER(S): .5; 2.5 SOLUTION RESPIRATORY (INHALATION) at 15:12

## 2025-01-01 RX ADMIN — IPRATROPIUM BROMIDE AND ALBUTEROL SULFATE 3 MILLILITER(S): .5; 2.5 SOLUTION RESPIRATORY (INHALATION) at 08:44

## 2025-01-01 RX ADMIN — Medication 15 MILLILITER(S): at 05:09

## 2025-01-01 RX ADMIN — POLYETHYLENE GLYCOL 3350 17 GRAM(S): 17 POWDER, FOR SOLUTION ORAL at 18:20

## 2025-01-01 RX ADMIN — NOREPINEPHRINE BITARTRATE 2.16 MICROGRAM(S)/KG/MIN: 8 SOLUTION at 17:28

## 2025-01-01 RX ADMIN — MIDODRINE HYDROCHLORIDE 20 MILLIGRAM(S): 5 TABLET ORAL at 03:39

## 2025-01-01 RX ADMIN — Medication 81 MILLIGRAM(S): at 11:52

## 2025-01-01 RX ADMIN — POLYETHYLENE GLYCOL 3350 17 GRAM(S): 17 POWDER, FOR SOLUTION ORAL at 05:21

## 2025-01-01 RX ADMIN — NOREPINEPHRINE BITARTRATE 2.16 MICROGRAM(S)/KG/MIN: 8 SOLUTION at 12:48

## 2025-01-01 RX ADMIN — Medication 15 MILLILITER(S): at 05:32

## 2025-01-01 RX ADMIN — Medication 20 MILLIEQUIVALENT(S): at 02:32

## 2025-01-01 RX ADMIN — Medication 15 MILLILITER(S): at 18:34

## 2025-01-01 RX ADMIN — DROXIDOPA 200 MILLIGRAM(S): 300 CAPSULE ORAL at 22:40

## 2025-01-01 RX ADMIN — IPRATROPIUM BROMIDE AND ALBUTEROL SULFATE 3 MILLILITER(S): .5; 2.5 SOLUTION RESPIRATORY (INHALATION) at 15:11

## 2025-01-01 RX ADMIN — Medication 650 MILLIGRAM(S): at 03:00

## 2025-01-01 RX ADMIN — IPRATROPIUM BROMIDE AND ALBUTEROL SULFATE 3 MILLILITER(S): .5; 2.5 SOLUTION RESPIRATORY (INHALATION) at 09:05

## 2025-01-01 RX ADMIN — IPRATROPIUM BROMIDE AND ALBUTEROL SULFATE 3 MILLILITER(S): .5; 2.5 SOLUTION RESPIRATORY (INHALATION) at 08:00

## 2025-01-01 RX ADMIN — NYSTATIN 1 APPLICATION(S): 100000 CREAM TOPICAL at 05:22

## 2025-01-01 RX ADMIN — Medication 1 APPLICATION(S): at 05:22

## 2025-01-01 RX ADMIN — DROXIDOPA 600 MILLIGRAM(S): 300 CAPSULE ORAL at 13:27

## 2025-01-01 RX ADMIN — HEPARIN SODIUM 5000 UNIT(S): 1000 INJECTION INTRAVENOUS; SUBCUTANEOUS at 06:29

## 2025-01-01 RX ADMIN — Medication 700 MILLIGRAM(S): at 18:00

## 2025-01-01 RX ADMIN — MIDODRINE HYDROCHLORIDE 20 MILLIGRAM(S): 5 TABLET ORAL at 20:18

## 2025-01-01 RX ADMIN — Medication 81 MILLIGRAM(S): at 12:43

## 2025-01-01 RX ADMIN — Medication 650 MILLIGRAM(S): at 20:15

## 2025-01-01 RX ADMIN — MIDODRINE HYDROCHLORIDE 20 MILLIGRAM(S): 5 TABLET ORAL at 19:43

## 2025-01-01 RX ADMIN — NOREPINEPHRINE BITARTRATE 2.16 MICROGRAM(S)/KG/MIN: 8 SOLUTION at 07:50

## 2025-01-01 RX ADMIN — DROXIDOPA 400 MILLIGRAM(S): 300 CAPSULE ORAL at 13:47

## 2025-01-01 RX ADMIN — IPRATROPIUM BROMIDE AND ALBUTEROL SULFATE 3 MILLILITER(S): .5; 2.5 SOLUTION RESPIRATORY (INHALATION) at 20:58

## 2025-01-01 RX ADMIN — MEROPENEM 100 MILLIGRAM(S): 1 INJECTION INTRAVENOUS at 15:12

## 2025-01-01 RX ADMIN — IPRATROPIUM BROMIDE AND ALBUTEROL SULFATE 3 MILLILITER(S): .5; 2.5 SOLUTION RESPIRATORY (INHALATION) at 07:37

## 2025-01-01 RX ADMIN — POLYETHYLENE GLYCOL 3350 17 GRAM(S): 17 POWDER, FOR SOLUTION ORAL at 18:38

## 2025-01-01 RX ADMIN — HEPARIN SODIUM 5000 UNIT(S): 1000 INJECTION INTRAVENOUS; SUBCUTANEOUS at 18:08

## 2025-01-01 RX ADMIN — MIDODRINE HYDROCHLORIDE 20 MILLIGRAM(S): 5 TABLET ORAL at 21:28

## 2025-01-01 RX ADMIN — PROPOFOL 13.8 MICROGRAM(S)/KG/MIN: 10 INJECTION, EMULSION INTRAVENOUS at 07:49

## 2025-01-01 RX ADMIN — IPRATROPIUM BROMIDE AND ALBUTEROL SULFATE 3 MILLILITER(S): .5; 2.5 SOLUTION RESPIRATORY (INHALATION) at 21:03

## 2025-01-01 RX ADMIN — Medication 1 TABLET(S): at 11:52

## 2025-01-01 RX ADMIN — Medication 500 MILLIGRAM(S): at 11:52

## 2025-01-01 RX ADMIN — INSULIN LISPRO 1: 100 INJECTION, SOLUTION INTRAVENOUS; SUBCUTANEOUS at 17:33

## 2025-01-01 RX ADMIN — IPRATROPIUM BROMIDE AND ALBUTEROL SULFATE 3 MILLILITER(S): .5; 2.5 SOLUTION RESPIRATORY (INHALATION) at 20:19

## 2025-01-01 RX ADMIN — ATORVASTATIN CALCIUM 40 MILLIGRAM(S): 80 TABLET, FILM COATED ORAL at 22:39

## 2025-01-01 RX ADMIN — NYSTATIN 1 APPLICATION(S): 100000 CREAM TOPICAL at 17:59

## 2025-01-01 RX ADMIN — NOREPINEPHRINE BITARTRATE 2.16 MICROGRAM(S)/KG/MIN: 8 SOLUTION at 08:54

## 2025-01-01 RX ADMIN — DROXIDOPA 600 MILLIGRAM(S): 300 CAPSULE ORAL at 14:29

## 2025-01-01 RX ADMIN — NOREPINEPHRINE BITARTRATE 2.16 MICROGRAM(S)/KG/MIN: 8 SOLUTION at 19:23

## 2025-01-01 RX ADMIN — Medication 1 APPLICATION(S): at 05:32

## 2025-01-01 RX ADMIN — DROXIDOPA 600 MILLIGRAM(S): 300 CAPSULE ORAL at 05:04

## 2025-01-01 RX ADMIN — CALCIUM GLUCONATE 200 GRAM(S): 20 INJECTION, SOLUTION INTRAVENOUS at 02:38

## 2025-01-01 RX ADMIN — INSULIN LISPRO 3: 100 INJECTION, SOLUTION INTRAVENOUS; SUBCUTANEOUS at 01:07

## 2025-01-01 RX ADMIN — MIDODRINE HYDROCHLORIDE 20 MILLIGRAM(S): 5 TABLET ORAL at 05:08

## 2025-01-01 RX ADMIN — Medication 650 MILLIGRAM(S): at 19:52

## 2025-01-01 RX ADMIN — Medication 500 MILLIGRAM(S): at 11:45

## 2025-01-01 RX ADMIN — Medication 2 TABLET(S): at 21:18

## 2025-01-01 RX ADMIN — HEPARIN SODIUM 5000 UNIT(S): 1000 INJECTION INTRAVENOUS; SUBCUTANEOUS at 18:03

## 2025-01-01 RX ADMIN — DROXIDOPA 200 MILLIGRAM(S): 300 CAPSULE ORAL at 13:28

## 2025-01-01 RX ADMIN — NYSTATIN 1 APPLICATION(S): 100000 CREAM TOPICAL at 17:08

## 2025-01-01 RX ADMIN — ATORVASTATIN CALCIUM 40 MILLIGRAM(S): 80 TABLET, FILM COATED ORAL at 21:28

## 2025-01-01 RX ADMIN — Medication 81 MILLIGRAM(S): at 11:45

## 2025-01-01 RX ADMIN — Medication 25 GRAM(S): at 05:32

## 2025-01-01 RX ADMIN — IPRATROPIUM BROMIDE AND ALBUTEROL SULFATE 3 MILLILITER(S): .5; 2.5 SOLUTION RESPIRATORY (INHALATION) at 09:23

## 2025-01-01 RX ADMIN — INSULIN LISPRO 2: 100 INJECTION, SOLUTION INTRAVENOUS; SUBCUTANEOUS at 00:34

## 2025-01-01 RX ADMIN — NOREPINEPHRINE BITARTRATE 2.16 MICROGRAM(S)/KG/MIN: 8 SOLUTION at 08:39

## 2025-01-01 RX ADMIN — Medication 500 MILLIGRAM(S): at 12:43

## 2025-01-01 RX ADMIN — Medication 1 APPLICATION(S): at 05:37

## 2025-01-01 RX ADMIN — Medication 2 DROP(S): at 11:53

## 2025-01-01 RX ADMIN — IPRATROPIUM BROMIDE AND ALBUTEROL SULFATE 3 MILLILITER(S): .5; 2.5 SOLUTION RESPIRATORY (INHALATION) at 16:25

## 2025-01-01 RX ADMIN — POLYETHYLENE GLYCOL 3350 17 GRAM(S): 17 POWDER, FOR SOLUTION ORAL at 05:20

## 2025-01-01 RX ADMIN — TIMOLOL MALEATE 1 DROP(S): 6.8 SOLUTION OPHTHALMIC at 11:22

## 2025-01-01 RX ADMIN — Medication 2 TABLET(S): at 21:20

## 2025-01-01 RX ADMIN — DROXIDOPA 200 MILLIGRAM(S): 300 CAPSULE ORAL at 05:59

## 2025-01-01 RX ADMIN — TIMOLOL MALEATE 1 DROP(S): 6.8 SOLUTION OPHTHALMIC at 11:29

## 2025-01-01 RX ADMIN — POLYETHYLENE GLYCOL 3350 17 GRAM(S): 17 POWDER, FOR SOLUTION ORAL at 17:59

## 2025-01-01 RX ADMIN — MIDODRINE HYDROCHLORIDE 20 MILLIGRAM(S): 5 TABLET ORAL at 05:04

## 2025-01-01 RX ADMIN — MIDODRINE HYDROCHLORIDE 20 MILLIGRAM(S): 5 TABLET ORAL at 13:14

## 2025-01-01 RX ADMIN — ATORVASTATIN CALCIUM 40 MILLIGRAM(S): 80 TABLET, FILM COATED ORAL at 21:58

## 2025-01-01 RX ADMIN — IPRATROPIUM BROMIDE AND ALBUTEROL SULFATE 3 MILLILITER(S): .5; 2.5 SOLUTION RESPIRATORY (INHALATION) at 08:17

## 2025-01-01 RX ADMIN — Medication 15 MILLILITER(S): at 05:04

## 2025-01-01 RX ADMIN — MIDODRINE HYDROCHLORIDE 20 MILLIGRAM(S): 5 TABLET ORAL at 12:43

## 2025-01-01 RX ADMIN — Medication 1 TABLET(S): at 12:43

## 2025-01-01 RX ADMIN — Medication 280 MILLIGRAM(S): at 17:22

## 2025-01-01 RX ADMIN — ATORVASTATIN CALCIUM 40 MILLIGRAM(S): 80 TABLET, FILM COATED ORAL at 21:52

## 2025-01-01 RX ADMIN — Medication 40 MILLIGRAM(S): at 11:44

## 2025-01-01 RX ADMIN — HEPARIN SODIUM 5000 UNIT(S): 1000 INJECTION INTRAVENOUS; SUBCUTANEOUS at 17:08

## 2025-01-01 RX ADMIN — NYSTATIN 1 APPLICATION(S): 100000 CREAM TOPICAL at 05:44

## 2025-01-01 RX ADMIN — IPRATROPIUM BROMIDE AND ALBUTEROL SULFATE 3 MILLILITER(S): .5; 2.5 SOLUTION RESPIRATORY (INHALATION) at 15:36

## 2025-01-01 RX ADMIN — HEPARIN SODIUM 5000 UNIT(S): 1000 INJECTION INTRAVENOUS; SUBCUTANEOUS at 18:20

## 2025-01-01 RX ADMIN — IPRATROPIUM BROMIDE AND ALBUTEROL SULFATE 3 MILLILITER(S): .5; 2.5 SOLUTION RESPIRATORY (INHALATION) at 15:31

## 2025-01-01 RX ADMIN — Medication 81 MILLIGRAM(S): at 12:06

## 2025-01-01 RX ADMIN — Medication 1 TABLET(S): at 16:54

## 2025-01-01 RX ADMIN — Medication 40 MILLIGRAM(S): at 13:13

## 2025-01-01 RX ADMIN — MIDODRINE HYDROCHLORIDE 20 MILLIGRAM(S): 5 TABLET ORAL at 05:21

## 2025-01-01 RX ADMIN — PROPOFOL 4.14 MICROGRAM(S)/KG/MIN: 10 INJECTION, EMULSION INTRAVENOUS at 17:54

## 2025-01-01 RX ADMIN — MEROPENEM 100 MILLIGRAM(S): 1 INJECTION INTRAVENOUS at 14:57

## 2025-01-01 RX ADMIN — Medication 81 MILLIGRAM(S): at 11:27

## 2025-01-01 RX ADMIN — Medication 1 TABLET(S): at 11:11

## 2025-01-01 RX ADMIN — Medication 650 MILLIGRAM(S): at 20:09

## 2025-01-01 RX ADMIN — INSULIN LISPRO 3: 100 INJECTION, SOLUTION INTRAVENOUS; SUBCUTANEOUS at 18:11

## 2025-01-01 RX ADMIN — Medication 15 MILLILITER(S): at 06:29

## 2025-01-01 RX ADMIN — DROXIDOPA 600 MILLIGRAM(S): 300 CAPSULE ORAL at 22:42

## 2025-01-01 RX ADMIN — INSULIN LISPRO 3: 100 INJECTION, SOLUTION INTRAVENOUS; SUBCUTANEOUS at 23:45

## 2025-01-01 RX ADMIN — INSULIN LISPRO 2: 100 INJECTION, SOLUTION INTRAVENOUS; SUBCUTANEOUS at 06:30

## 2025-01-01 RX ADMIN — NYSTATIN 1 APPLICATION(S): 100000 CREAM TOPICAL at 17:26

## 2025-01-01 RX ADMIN — Medication 500 MILLIGRAM(S): at 12:55

## 2025-01-01 RX ADMIN — POLYETHYLENE GLYCOL 3350 17 GRAM(S): 17 POWDER, FOR SOLUTION ORAL at 05:32

## 2025-01-01 RX ADMIN — MIDODRINE HYDROCHLORIDE 7.5 MILLIGRAM(S): 5 TABLET ORAL at 11:49

## 2025-01-01 RX ADMIN — TIMOLOL MALEATE 1 DROP(S): 6.8 SOLUTION OPHTHALMIC at 12:48

## 2025-01-01 RX ADMIN — Medication 2 DROP(S): at 12:08

## 2025-01-01 RX ADMIN — NOREPINEPHRINE BITARTRATE 2.16 MICROGRAM(S)/KG/MIN: 8 SOLUTION at 20:10

## 2025-01-01 RX ADMIN — Medication 2 TABLET(S): at 21:22

## 2025-01-01 RX ADMIN — Medication 81 MILLIGRAM(S): at 16:54

## 2025-01-01 RX ADMIN — MEROPENEM 100 MILLIGRAM(S): 1 INJECTION INTRAVENOUS at 15:52

## 2025-01-01 RX ADMIN — NOREPINEPHRINE BITARTRATE 2.16 MICROGRAM(S)/KG/MIN: 8 SOLUTION at 19:16

## 2025-01-01 RX ADMIN — DROXIDOPA 600 MILLIGRAM(S): 300 CAPSULE ORAL at 21:06

## 2025-01-01 RX ADMIN — MIDODRINE HYDROCHLORIDE 20 MILLIGRAM(S): 5 TABLET ORAL at 04:35

## 2025-01-01 RX ADMIN — Medication 15 MILLILITER(S): at 05:19

## 2025-01-01 RX ADMIN — Medication 1 APPLICATION(S): at 05:10

## 2025-01-01 RX ADMIN — DROXIDOPA 600 MILLIGRAM(S): 300 CAPSULE ORAL at 05:09

## 2025-01-01 RX ADMIN — DROXIDOPA 600 MILLIGRAM(S): 300 CAPSULE ORAL at 22:01

## 2025-01-01 RX ADMIN — IPRATROPIUM BROMIDE AND ALBUTEROL SULFATE 3 MILLILITER(S): .5; 2.5 SOLUTION RESPIRATORY (INHALATION) at 22:20

## 2025-01-01 RX ADMIN — IPRATROPIUM BROMIDE AND ALBUTEROL SULFATE 3 MILLILITER(S): .5; 2.5 SOLUTION RESPIRATORY (INHALATION) at 10:27

## 2025-01-01 RX ADMIN — Medication 250 MILLIGRAM(S): at 17:04

## 2025-01-01 RX ADMIN — POLYETHYLENE GLYCOL 3350 17 GRAM(S): 17 POWDER, FOR SOLUTION ORAL at 06:29

## 2025-01-01 RX ADMIN — Medication 100 MICROGRAM(S): at 14:34

## 2025-01-01 RX ADMIN — IPRATROPIUM BROMIDE AND ALBUTEROL SULFATE 3 MILLILITER(S): .5; 2.5 SOLUTION RESPIRATORY (INHALATION) at 03:07

## 2025-01-01 RX ADMIN — Medication 2 TABLET(S): at 21:28

## 2025-01-01 RX ADMIN — ATORVASTATIN CALCIUM 40 MILLIGRAM(S): 80 TABLET, FILM COATED ORAL at 21:20

## 2025-01-01 RX ADMIN — TIMOLOL MALEATE 1 DROP(S): 6.8 SOLUTION OPHTHALMIC at 12:07

## 2025-01-01 RX ADMIN — TIMOLOL MALEATE 1 DROP(S): 6.8 SOLUTION OPHTHALMIC at 11:43

## 2025-01-01 RX ADMIN — HEPARIN SODIUM 5000 UNIT(S): 1000 INJECTION INTRAVENOUS; SUBCUTANEOUS at 05:04

## 2025-01-01 RX ADMIN — MEROPENEM 100 MILLIGRAM(S): 1 INJECTION INTRAVENOUS at 15:08

## 2025-01-01 RX ADMIN — HEPARIN SODIUM 5000 UNIT(S): 1000 INJECTION INTRAVENOUS; SUBCUTANEOUS at 05:20

## 2025-01-01 RX ADMIN — Medication 15 MILLILITER(S): at 17:03

## 2025-01-01 RX ADMIN — INSULIN LISPRO 1: 100 INJECTION, SOLUTION INTRAVENOUS; SUBCUTANEOUS at 13:15

## 2025-01-01 RX ADMIN — Medication 2 DROP(S): at 11:29

## 2025-01-01 RX ADMIN — POLYETHYLENE GLYCOL 3350 17 GRAM(S): 17 POWDER, FOR SOLUTION ORAL at 05:40

## 2025-01-01 RX ADMIN — HEPARIN SODIUM 5000 UNIT(S): 1000 INJECTION INTRAVENOUS; SUBCUTANEOUS at 17:12

## 2025-01-01 RX ADMIN — IPRATROPIUM BROMIDE AND ALBUTEROL SULFATE 3 MILLILITER(S): .5; 2.5 SOLUTION RESPIRATORY (INHALATION) at 03:23

## 2025-01-01 RX ADMIN — IPRATROPIUM BROMIDE AND ALBUTEROL SULFATE 3 MILLILITER(S): .5; 2.5 SOLUTION RESPIRATORY (INHALATION) at 03:55

## 2025-01-01 RX ADMIN — PROPOFOL 4.14 MICROGRAM(S)/KG/MIN: 10 INJECTION, EMULSION INTRAVENOUS at 20:10

## 2025-01-01 RX ADMIN — POLYETHYLENE GLYCOL 3350 17 GRAM(S): 17 POWDER, FOR SOLUTION ORAL at 05:44

## 2025-01-01 RX ADMIN — IPRATROPIUM BROMIDE AND ALBUTEROL SULFATE 3 MILLILITER(S): .5; 2.5 SOLUTION RESPIRATORY (INHALATION) at 10:46

## 2025-01-01 RX ADMIN — Medication 1 TABLET(S): at 12:54

## 2025-01-01 RX ADMIN — NYSTATIN 1 APPLICATION(S): 100000 CREAM TOPICAL at 17:12

## 2025-01-01 RX ADMIN — ATORVASTATIN CALCIUM 40 MILLIGRAM(S): 80 TABLET, FILM COATED ORAL at 21:49

## 2025-01-01 RX ADMIN — NYSTATIN 1 APPLICATION(S): 100000 CREAM TOPICAL at 18:39

## 2025-01-01 RX ADMIN — Medication 200 GRAM(S): at 16:42

## 2025-01-01 RX ADMIN — Medication 15 MILLILITER(S): at 17:26

## 2025-01-01 RX ADMIN — Medication 15 MILLILITER(S): at 17:08

## 2025-01-01 RX ADMIN — IPRATROPIUM BROMIDE AND ALBUTEROL SULFATE 3 MILLILITER(S): .5; 2.5 SOLUTION RESPIRATORY (INHALATION) at 15:57

## 2025-01-01 RX ADMIN — Medication 15 MILLILITER(S): at 17:25

## 2025-01-01 RX ADMIN — DROXIDOPA 600 MILLIGRAM(S): 300 CAPSULE ORAL at 05:31

## 2025-01-01 RX ADMIN — DROXIDOPA 200 MILLIGRAM(S): 300 CAPSULE ORAL at 06:29

## 2025-01-01 RX ADMIN — IPRATROPIUM BROMIDE AND ALBUTEROL SULFATE 3 MILLILITER(S): .5; 2.5 SOLUTION RESPIRATORY (INHALATION) at 21:30

## 2025-01-01 RX ADMIN — INSULIN LISPRO 3: 100 INJECTION, SOLUTION INTRAVENOUS; SUBCUTANEOUS at 05:44

## 2025-01-01 RX ADMIN — Medication 81 MILLIGRAM(S): at 13:13

## 2025-01-01 RX ADMIN — MIDODRINE HYDROCHLORIDE 20 MILLIGRAM(S): 5 TABLET ORAL at 12:54

## 2025-01-01 RX ADMIN — HEPARIN SODIUM 5000 UNIT(S): 1000 INJECTION INTRAVENOUS; SUBCUTANEOUS at 05:59

## 2025-01-01 RX ADMIN — ATORVASTATIN CALCIUM 40 MILLIGRAM(S): 80 TABLET, FILM COATED ORAL at 22:41

## 2025-01-01 RX ADMIN — DROXIDOPA 600 MILLIGRAM(S): 300 CAPSULE ORAL at 21:20

## 2025-01-01 RX ADMIN — IPRATROPIUM BROMIDE AND ALBUTEROL SULFATE 3 MILLILITER(S): .5; 2.5 SOLUTION RESPIRATORY (INHALATION) at 03:59

## 2025-01-01 RX ADMIN — IPRATROPIUM BROMIDE AND ALBUTEROL SULFATE 3 MILLILITER(S): .5; 2.5 SOLUTION RESPIRATORY (INHALATION) at 08:06

## 2025-01-01 RX ADMIN — IPRATROPIUM BROMIDE AND ALBUTEROL SULFATE 3 MILLILITER(S): .5; 2.5 SOLUTION RESPIRATORY (INHALATION) at 22:23

## 2025-01-01 RX ADMIN — NOREPINEPHRINE BITARTRATE 2.16 MICROGRAM(S)/KG/MIN: 8 SOLUTION at 22:40

## 2025-01-01 RX ADMIN — NOREPINEPHRINE BITARTRATE 2.16 MICROGRAM(S)/KG/MIN: 8 SOLUTION at 05:44

## 2025-01-01 RX ADMIN — Medication 5 MILLILITER(S): at 14:40

## 2025-01-01 RX ADMIN — IPRATROPIUM BROMIDE AND ALBUTEROL SULFATE 3 MILLILITER(S): .5; 2.5 SOLUTION RESPIRATORY (INHALATION) at 02:59

## 2025-01-01 RX ADMIN — Medication 40 MILLIGRAM(S): at 12:56

## 2025-01-01 RX ADMIN — NOREPINEPHRINE BITARTRATE 2.16 MICROGRAM(S)/KG/MIN: 8 SOLUTION at 19:54

## 2025-01-01 RX ADMIN — DROXIDOPA 600 MILLIGRAM(S): 300 CAPSULE ORAL at 14:24

## 2025-01-01 RX ADMIN — DROXIDOPA 600 MILLIGRAM(S): 300 CAPSULE ORAL at 21:58

## 2025-01-01 RX ADMIN — Medication 1 APPLICATION(S): at 05:45

## 2025-01-01 RX ADMIN — MEROPENEM 100 MILLIGRAM(S): 1 INJECTION INTRAVENOUS at 15:29

## 2025-01-01 RX ADMIN — NYSTATIN 1 APPLICATION(S): 100000 CREAM TOPICAL at 17:31

## 2025-01-01 RX ADMIN — MIDODRINE HYDROCHLORIDE 20 MILLIGRAM(S): 5 TABLET ORAL at 12:05

## 2025-01-01 RX ADMIN — MIDODRINE HYDROCHLORIDE 20 MILLIGRAM(S): 5 TABLET ORAL at 11:42

## 2025-01-01 RX ADMIN — DROXIDOPA 600 MILLIGRAM(S): 300 CAPSULE ORAL at 05:32

## 2025-01-01 RX ADMIN — Medication 15 MILLILITER(S): at 18:08

## 2025-01-01 RX ADMIN — MEROPENEM 100 MILLIGRAM(S): 1 INJECTION INTRAVENOUS at 15:36

## 2025-01-01 RX ADMIN — Medication 1 APPLICATION(S): at 05:58

## 2025-01-01 RX ADMIN — Medication 40 MILLIGRAM(S): at 11:52

## 2025-01-01 RX ADMIN — PROPOFOL 4.14 MICROGRAM(S)/KG/MIN: 10 INJECTION, EMULSION INTRAVENOUS at 07:50

## 2025-01-01 RX ADMIN — DROXIDOPA 400 MILLIGRAM(S): 300 CAPSULE ORAL at 21:50

## 2025-01-01 RX ADMIN — NOREPINEPHRINE BITARTRATE 2.16 MICROGRAM(S)/KG/MIN: 8 SOLUTION at 12:53

## 2025-01-01 RX ADMIN — Medication 81 MILLIGRAM(S): at 11:42

## 2025-01-01 RX ADMIN — HEPARIN SODIUM 5000 UNIT(S): 1000 INJECTION INTRAVENOUS; SUBCUTANEOUS at 17:32

## 2025-01-01 RX ADMIN — Medication 40 MILLIGRAM(S): at 12:05

## 2025-01-01 RX ADMIN — Medication 650 MILLIGRAM(S): at 05:15

## 2025-01-01 RX ADMIN — Medication 25 GRAM(S): at 23:13

## 2025-01-01 RX ADMIN — MEROPENEM 100 MILLIGRAM(S): 1 INJECTION INTRAVENOUS at 15:04

## 2025-01-01 RX ADMIN — Medication 40 MILLIGRAM(S): at 11:24

## 2025-01-01 RX ADMIN — Medication 1 APPLICATION(S): at 05:06

## 2025-01-01 RX ADMIN — NOREPINEPHRINE BITARTRATE 2.16 MICROGRAM(S)/KG/MIN: 8 SOLUTION at 07:42

## 2025-01-01 RX ADMIN — Medication 15 MILLILITER(S): at 05:41

## 2025-01-01 RX ADMIN — Medication 15 MILLILITER(S): at 17:12

## 2025-01-01 RX ADMIN — Medication 1 TABLET(S): at 11:23

## 2025-01-01 RX ADMIN — ATORVASTATIN CALCIUM 40 MILLIGRAM(S): 80 TABLET, FILM COATED ORAL at 21:05

## 2025-01-01 RX ADMIN — NYSTATIN 1 APPLICATION(S): 100000 CREAM TOPICAL at 05:33

## 2025-01-01 RX ADMIN — PROPOFOL 13.8 MICROGRAM(S)/KG/MIN: 10 INJECTION, EMULSION INTRAVENOUS at 19:23

## 2025-01-01 RX ADMIN — Medication 1 TABLET(S): at 13:13

## 2025-01-01 RX ADMIN — Medication 40 MILLIGRAM(S): at 12:43

## 2025-01-01 RX ADMIN — Medication 1 APPLICATION(S): at 06:37

## 2025-01-01 RX ADMIN — INSULIN LISPRO 2: 100 INJECTION, SOLUTION INTRAVENOUS; SUBCUTANEOUS at 05:22

## 2025-01-01 RX ADMIN — Medication 650 MILLIGRAM(S): at 14:34

## 2025-01-01 RX ADMIN — NYSTATIN 1 APPLICATION(S): 100000 CREAM TOPICAL at 05:04

## 2025-01-01 RX ADMIN — MIDODRINE HYDROCHLORIDE 20 MILLIGRAM(S): 5 TABLET ORAL at 05:19

## 2025-01-01 RX ADMIN — Medication 81 MILLIGRAM(S): at 12:56

## 2025-01-01 RX ADMIN — ATORVASTATIN CALCIUM 40 MILLIGRAM(S): 80 TABLET, FILM COATED ORAL at 21:17

## 2025-01-01 RX ADMIN — DROXIDOPA 600 MILLIGRAM(S): 300 CAPSULE ORAL at 21:30

## 2025-01-01 RX ADMIN — Medication 500 MILLIGRAM(S): at 11:30

## 2025-01-01 RX ADMIN — Medication 650 MILLIGRAM(S): at 13:28

## 2025-01-01 RX ADMIN — NYSTATIN 1 APPLICATION(S): 100000 CREAM TOPICAL at 18:04

## 2025-01-01 RX ADMIN — DROXIDOPA 600 MILLIGRAM(S): 300 CAPSULE ORAL at 13:12

## 2025-01-01 RX ADMIN — Medication 2 DROP(S): at 12:48

## 2025-01-01 RX ADMIN — Medication 2 TABLET(S): at 21:50

## 2025-01-01 RX ADMIN — INSULIN LISPRO 2: 100 INJECTION, SOLUTION INTRAVENOUS; SUBCUTANEOUS at 05:47

## 2025-01-01 RX ADMIN — DROXIDOPA 600 MILLIGRAM(S): 300 CAPSULE ORAL at 21:22

## 2025-01-01 RX ADMIN — Medication 1 TABLET(S): at 12:42

## 2025-01-01 RX ADMIN — Medication 1 TABLET(S): at 11:28

## 2025-01-01 RX ADMIN — NYSTATIN 1 APPLICATION(S): 100000 CREAM TOPICAL at 05:20

## 2025-01-01 RX ADMIN — DROXIDOPA 600 MILLIGRAM(S): 300 CAPSULE ORAL at 13:44

## 2025-01-01 RX ADMIN — DROXIDOPA 600 MILLIGRAM(S): 300 CAPSULE ORAL at 05:36

## 2025-01-01 RX ADMIN — Medication 25 GRAM(S): at 05:44

## 2025-01-01 RX ADMIN — IPRATROPIUM BROMIDE AND ALBUTEROL SULFATE 3 MILLILITER(S): .5; 2.5 SOLUTION RESPIRATORY (INHALATION) at 15:04

## 2025-01-01 RX ADMIN — Medication 2 TABLET(S): at 22:40

## 2025-01-01 RX ADMIN — ATORVASTATIN CALCIUM 40 MILLIGRAM(S): 80 TABLET, FILM COATED ORAL at 21:22

## 2025-01-01 RX ADMIN — HEPARIN SODIUM 5000 UNIT(S): 1000 INJECTION INTRAVENOUS; SUBCUTANEOUS at 05:09

## 2025-01-01 RX ADMIN — Medication 40 MILLIGRAM(S): at 11:28

## 2025-01-01 RX ADMIN — Medication 1 TABLET(S): at 12:06

## 2025-01-01 RX ADMIN — NOREPINEPHRINE BITARTRATE 2.16 MICROGRAM(S)/KG/MIN: 8 SOLUTION at 11:45

## 2025-01-01 RX ADMIN — Medication 15 MILLILITER(S): at 18:03

## 2025-01-01 RX ADMIN — DROXIDOPA 600 MILLIGRAM(S): 300 CAPSULE ORAL at 14:09

## 2025-01-01 RX ADMIN — HEPARIN SODIUM 5000 UNIT(S): 1000 INJECTION INTRAVENOUS; SUBCUTANEOUS at 18:38

## 2025-01-01 RX ADMIN — Medication 2 MILLIGRAM(S): at 14:35

## 2025-01-01 RX ADMIN — DROXIDOPA 600 MILLIGRAM(S): 300 CAPSULE ORAL at 16:53

## 2025-01-01 RX ADMIN — NOREPINEPHRINE BITARTRATE 2.16 MICROGRAM(S)/KG/MIN: 8 SOLUTION at 11:43

## 2025-01-01 RX ADMIN — Medication 650 MILLIGRAM(S): at 20:25

## 2025-01-01 RX ADMIN — NYSTATIN 1 APPLICATION(S): 100000 CREAM TOPICAL at 05:58

## 2025-01-01 RX ADMIN — ATORVASTATIN CALCIUM 40 MILLIGRAM(S): 80 TABLET, FILM COATED ORAL at 22:13

## 2025-01-01 RX ADMIN — INSULIN LISPRO 1: 100 INJECTION, SOLUTION INTRAVENOUS; SUBCUTANEOUS at 17:59

## 2025-01-01 RX ADMIN — IPRATROPIUM BROMIDE AND ALBUTEROL SULFATE 3 MILLILITER(S): .5; 2.5 SOLUTION RESPIRATORY (INHALATION) at 20:36

## 2025-01-01 RX ADMIN — Medication 500 MILLIGRAM(S): at 11:23

## 2025-01-01 RX ADMIN — Medication 63.75 MILLIMOLE(S): at 03:32

## 2025-01-01 RX ADMIN — Medication 1 APPLICATION(S): at 05:15

## 2025-01-01 RX ADMIN — DROXIDOPA 600 MILLIGRAM(S): 300 CAPSULE ORAL at 14:36

## 2025-01-01 RX ADMIN — NOREPINEPHRINE BITARTRATE 2.16 MICROGRAM(S)/KG/MIN: 8 SOLUTION at 19:37

## 2025-01-01 RX ADMIN — PROPOFOL 13.8 MICROGRAM(S)/KG/MIN: 10 INJECTION, EMULSION INTRAVENOUS at 16:42

## 2025-01-01 RX ADMIN — NYSTATIN 1 APPLICATION(S): 100000 CREAM TOPICAL at 05:09

## 2025-01-01 RX ADMIN — DROXIDOPA 600 MILLIGRAM(S): 300 CAPSULE ORAL at 21:28

## 2025-01-01 RX ADMIN — NOREPINEPHRINE BITARTRATE 2.16 MICROGRAM(S)/KG/MIN: 8 SOLUTION at 03:07

## 2025-01-01 RX ADMIN — MIDODRINE HYDROCHLORIDE 20 MILLIGRAM(S): 5 TABLET ORAL at 21:20

## 2025-01-01 RX ADMIN — Medication 15 MILLILITER(S): at 05:35

## 2025-01-01 RX ADMIN — Medication 25 GRAM(S): at 17:32

## 2025-01-01 RX ADMIN — Medication 1 TABLET(S): at 12:55

## 2025-01-01 RX ADMIN — DROXIDOPA 600 MILLIGRAM(S): 300 CAPSULE ORAL at 05:43

## 2025-01-01 RX ADMIN — IPRATROPIUM BROMIDE AND ALBUTEROL SULFATE 3 MILLILITER(S): .5; 2.5 SOLUTION RESPIRATORY (INHALATION) at 04:07

## 2025-01-01 RX ADMIN — POLYETHYLENE GLYCOL 3350 17 GRAM(S): 17 POWDER, FOR SOLUTION ORAL at 18:04

## 2025-01-01 RX ADMIN — INSULIN LISPRO 3: 100 INJECTION, SOLUTION INTRAVENOUS; SUBCUTANEOUS at 00:21

## 2025-01-01 RX ADMIN — Medication 2 TABLET(S): at 22:41

## 2025-01-01 RX ADMIN — TIMOLOL MALEATE 1 DROP(S): 6.8 SOLUTION OPHTHALMIC at 11:45

## 2025-01-01 RX ADMIN — IPRATROPIUM BROMIDE AND ALBUTEROL SULFATE 3 MILLILITER(S): .5; 2.5 SOLUTION RESPIRATORY (INHALATION) at 08:15

## 2025-01-01 RX ADMIN — DROXIDOPA 600 MILLIGRAM(S): 300 CAPSULE ORAL at 15:08

## 2025-01-01 RX ADMIN — NOREPINEPHRINE BITARTRATE 2.16 MICROGRAM(S)/KG/MIN: 8 SOLUTION at 11:44

## 2025-01-01 RX ADMIN — Medication 15 MILLILITER(S): at 05:44

## 2025-01-01 RX ADMIN — IPRATROPIUM BROMIDE AND ALBUTEROL SULFATE 3 MILLILITER(S): .5; 2.5 SOLUTION RESPIRATORY (INHALATION) at 22:51

## 2025-01-01 RX ADMIN — Medication 10 MILLIGRAM(S): at 16:00

## 2025-01-01 RX ADMIN — INSULIN LISPRO 2: 100 INJECTION, SOLUTION INTRAVENOUS; SUBCUTANEOUS at 23:21

## 2025-01-01 RX ADMIN — Medication 650 MILLIGRAM(S): at 02:32

## 2025-01-01 RX ADMIN — Medication 15 MILLILITER(S): at 05:59

## 2025-01-01 RX ADMIN — HEPARIN SODIUM 5000 UNIT(S): 1000 INJECTION INTRAVENOUS; SUBCUTANEOUS at 05:43

## 2025-01-01 RX ADMIN — NYSTATIN 1 APPLICATION(S): 100000 CREAM TOPICAL at 05:41

## 2025-01-01 RX ADMIN — HEPARIN SODIUM 5000 UNIT(S): 1000 INJECTION INTRAVENOUS; SUBCUTANEOUS at 05:15

## 2025-01-01 RX ADMIN — POLYETHYLENE GLYCOL 3350 17 GRAM(S): 17 POWDER, FOR SOLUTION ORAL at 18:08

## 2025-01-01 RX ADMIN — Medication 40 MILLIGRAM(S): at 11:11

## 2025-01-01 RX ADMIN — DROXIDOPA 600 MILLIGRAM(S): 300 CAPSULE ORAL at 05:21

## 2025-01-01 RX ADMIN — NYSTATIN 1 APPLICATION(S): 100000 CREAM TOPICAL at 18:09

## 2025-01-01 RX ADMIN — INSULIN LISPRO 2: 100 INJECTION, SOLUTION INTRAVENOUS; SUBCUTANEOUS at 17:41

## 2025-01-01 RX ADMIN — TIMOLOL MALEATE 1 DROP(S): 6.8 SOLUTION OPHTHALMIC at 11:41

## 2025-01-01 RX ADMIN — NYSTATIN 1 APPLICATION(S): 100000 CREAM TOPICAL at 06:36

## 2025-01-01 RX ADMIN — Medication 15 MILLILITER(S): at 05:05

## 2025-01-01 RX ADMIN — TIMOLOL MALEATE 1 DROP(S): 6.8 SOLUTION OPHTHALMIC at 13:16

## 2025-01-01 RX ADMIN — POLYETHYLENE GLYCOL 3350 17 GRAM(S): 17 POWDER, FOR SOLUTION ORAL at 05:04

## 2025-01-01 RX ADMIN — Medication 40 MILLIGRAM(S): at 11:42

## 2025-01-01 RX ADMIN — Medication 650 MILLIGRAM(S): at 12:58

## 2025-01-01 RX ADMIN — HEPARIN SODIUM 5000 UNIT(S): 1000 INJECTION INTRAVENOUS; SUBCUTANEOUS at 19:06

## 2025-01-01 RX ADMIN — DROXIDOPA 400 MILLIGRAM(S): 300 CAPSULE ORAL at 05:40

## 2025-01-01 RX ADMIN — Medication 500 MILLIGRAM(S): at 11:11

## 2025-01-01 RX ADMIN — IPRATROPIUM BROMIDE AND ALBUTEROL SULFATE 3 MILLILITER(S): .5; 2.5 SOLUTION RESPIRATORY (INHALATION) at 15:27

## 2025-01-01 RX ADMIN — Medication 81 MILLIGRAM(S): at 11:11

## 2025-01-01 RX ADMIN — MIDODRINE HYDROCHLORIDE 20 MILLIGRAM(S): 5 TABLET ORAL at 12:06

## 2025-01-01 RX ADMIN — Medication 1 APPLICATION(S): at 05:19

## 2025-01-01 RX ADMIN — TIMOLOL MALEATE 1 DROP(S): 6.8 SOLUTION OPHTHALMIC at 11:10

## 2025-01-01 RX ADMIN — CALCIUM GLUCONATE 100 GRAM(S): 20 INJECTION, SOLUTION INTRAVENOUS at 11:51

## 2025-01-01 RX ADMIN — Medication 650 MILLIGRAM(S): at 14:00

## 2025-01-01 RX ADMIN — NYSTATIN 1 APPLICATION(S): 100000 CREAM TOPICAL at 18:20

## 2025-01-01 RX ADMIN — IPRATROPIUM BROMIDE AND ALBUTEROL SULFATE 3 MILLILITER(S): .5; 2.5 SOLUTION RESPIRATORY (INHALATION) at 21:41

## 2025-01-01 RX ADMIN — Medication 2 DROP(S): at 13:16

## 2025-01-01 RX ADMIN — MIDODRINE HYDROCHLORIDE 20 MILLIGRAM(S): 5 TABLET ORAL at 19:21

## 2025-01-01 RX ADMIN — Medication 650 MILLIGRAM(S): at 18:46

## 2025-01-01 RX ADMIN — INSULIN LISPRO 1: 100 INJECTION, SOLUTION INTRAVENOUS; SUBCUTANEOUS at 17:30

## 2025-01-01 RX ADMIN — Medication 15 MILLILITER(S): at 17:38

## 2025-01-01 RX ADMIN — Medication 2 DROP(S): at 11:44

## 2025-01-01 RX ADMIN — DROXIDOPA 600 MILLIGRAM(S): 300 CAPSULE ORAL at 21:17

## 2025-01-01 RX ADMIN — NYSTATIN 1 APPLICATION(S): 100000 CREAM TOPICAL at 05:34

## 2025-01-01 RX ADMIN — IPRATROPIUM BROMIDE AND ALBUTEROL SULFATE 3 MILLILITER(S): .5; 2.5 SOLUTION RESPIRATORY (INHALATION) at 22:36

## 2025-01-01 RX ADMIN — INSULIN LISPRO 2: 100 INJECTION, SOLUTION INTRAVENOUS; SUBCUTANEOUS at 12:57

## 2025-01-01 RX ADMIN — NOREPINEPHRINE BITARTRATE 2.16 MICROGRAM(S)/KG/MIN: 8 SOLUTION at 11:02

## 2025-01-01 RX ADMIN — Medication 500 MILLIGRAM(S): at 12:06

## 2025-01-01 RX ADMIN — TIMOLOL MALEATE 1 DROP(S): 6.8 SOLUTION OPHTHALMIC at 12:50

## 2025-01-01 RX ADMIN — IPRATROPIUM BROMIDE AND ALBUTEROL SULFATE 3 MILLILITER(S): .5; 2.5 SOLUTION RESPIRATORY (INHALATION) at 02:08

## 2025-01-01 RX ADMIN — IPRATROPIUM BROMIDE AND ALBUTEROL SULFATE 3 MILLILITER(S): .5; 2.5 SOLUTION RESPIRATORY (INHALATION) at 04:30

## 2025-01-14 LAB
CORTICOSTEROID BINDING GLOBULIN RESULT: 2 MG/DL — SIGNIFICANT CHANGE UP
CORTIS F/TOTAL MFR SERPL: 71 % — SIGNIFICANT CHANGE UP
CORTIS SERPL-MCNC: 47 UG/DL — HIGH
CORTISOL, FREE RESULT: 33 UG/DL — HIGH

## 2025-01-22 LAB
CULTURE RESULTS: SIGNIFICANT CHANGE UP
SPECIMEN SOURCE: SIGNIFICANT CHANGE UP

## 2025-01-23 LAB
CULTURE RESULTS: ABNORMAL
SPECIMEN SOURCE: SIGNIFICANT CHANGE UP

## 2025-01-25 LAB
CULTURE RESULTS: SIGNIFICANT CHANGE UP
SPECIMEN SOURCE: SIGNIFICANT CHANGE UP

## 2025-01-30 LAB
CULTURE RESULTS: ABNORMAL
SPECIMEN SOURCE: SIGNIFICANT CHANGE UP

## 2025-02-08 LAB
CULTURE RESULTS: SIGNIFICANT CHANGE UP
SPECIMEN SOURCE: SIGNIFICANT CHANGE UP

## 2025-02-12 LAB
CULTURE RESULTS: ABNORMAL
SPECIMEN SOURCE: SIGNIFICANT CHANGE UP

## 2025-02-18 ENCOUNTER — APPOINTMENT (OUTPATIENT)
Dept: ELECTROPHYSIOLOGY | Facility: CLINIC | Age: 70
End: 2025-02-18

## 2025-02-19 LAB
CULTURE RESULTS: SIGNIFICANT CHANGE UP
SPECIMEN SOURCE: SIGNIFICANT CHANGE UP

## 2025-03-06 NOTE — ED ADULT NURSE NOTE - OBJECTIVE STATEMENT
Brief Postoperative Note      Patient: Julianna Yen  YOB: 1966  MRN: [id number]    Date of Procedure: 3/6/2025    Right sided back pain. Facet syndrome.    Post-Op Diagnosis: Same       RIGHT Lumbar MBB at L4-5 and L5-S1 levels    Wilian Grove M.D.    Assistant:  * No surgical staff found *    Anesthesia: Local    Estimated Blood Loss (mL): Minimal    Complications: None    Specimens:   * No specimens in log *    Implants:  * No implants in log *      Drains: * No LDAs found *    Findings:  Infection Present At Time Of Surgery (PATOS) (choose all levels that have infection present):  No infection present  Other Findings: none    Electronically signed by Wilian Grove MD on 3/6/2025 at 4:09 PM   Pt BIBEMS s/p HD to L AV fistula, during dialysis pt had AMS. Dr. Adkins at triage assessing pt, pt not following all commands, code stroke called. Multiple attmepts made for IV access and unable to obtain. Pt in CT at this time with BECKI Rodriguez and Dr. Adkins attempting for US IV access. Pt with hx of multiple CVA.

## 2025-05-13 NOTE — PATIENT PROFILE ADULT - EQUIPMENT CURRENTLY USED AT HOME
Interventional Cardiology  Progress Note    Reason for Visit: Cardiovascular revisit   Last visit: 11/7/2021; 2/23/2022; 8/25/2022; 3/6/2023; 5/17/2023 not seen, 3/13/2024; 5/14/2025  Referring physician: Akhil Hahn MD  Pulmonology: Dr. Kirti Talbot  GI: Dr. Garfield Chau  HEME/ONC: Ricardo Hunt,   EYE: Dr. Rick Cordero    The patient was seen and examined and the chart was reviewed this date.  Thank you for your referral.  My impressions and recommendations are as follows:    IMPRESSIONS:  1. CAD with history of STEMI  - 11/7/2021 s/p Mid LCX Resolute 3 x 15 mm KEARA; Mid LAD Resolute 3 x 15 mm and 3 x 18 mm KEARA; Mid RCA Resolute 3 x 18 mm KEARA; Right Dom, EF 55%, LVEDP 18 mmHg via RRA at [facility] Dr. Martínez for STEMI culprit LCX by EKG  - 7/21/2023 ECHO EF 61%  - On aspirin, clopidogrel, rosuvastatin, carvedilol, losartan    5/15/2025  -Discussed continuing DAPT previously history of multivessel PCI, DM, HTN, HLD  -Restart Aspirin 81 mg daily  -Restart Dual antiplatelet therapy with Plavix 75 mg daily given multivessel PCI and diabetes  -Renew Echocardiogram with Definity contrast  -Renew exercise nuclear stress test    2  History of DVT   -4/20/2023 Right tibial vein DVT  -4/12/2023 vein mapping with Right Great Saphenous Vein mid to prox calf   -10/25/2024 venous doppler with no DVT  -no longer on Eliquis     5/15/2025   -stable    3.  Diabetes mellitus on Metformin, Amaryl, Farxiga  -2/21/2023 Screening ankle-brachial index unremarkable    4.  Hypertension  On carvedilol, losartan    5.  Dyslipidemia  On rosuvastatin    6.  History of tobacco abuse  Quit 11/7/2021 when he had his STEMI  -remains tobacco free since 2023    7.  Sleep apnea following with pulmonology    8.  History of Suppurative hydradenitis  -Awaiting possible surgical intervention  -If dual antiplatelet therapy to be held, 3 to 4 days would be acceptable ideally no longer than 5 to 7 days.  Fortunately he is at least 6  months post intervention with large caliber (3 mm) stents though multivessel in light of STEMI and diabetic patient  -stable    9.  Episode of Left lower extremity cellulitis 8/2022  -No evidence of DVT by Doppler  -2/21/2023 unremarkable ankle-brachial index for PVD  -4/12/2023 vein mapping with Right Great Saphenous Vein mid to prox calf     5/15/2025 Renew vein mapping Bilateral lower extremities given lower extremity swelling edema at times    RECOMMENDATIONS:  1. Regarding his CV status  - as above  Refills #[id number] 5/15/2025  Plavix, Coreg, Losartan, Crestor    If syncope, chest pain with exertion, or worsening symptoms occur advised to notify office or go to nearest ER  2.  Medications and labs reviewed  - Antiplatelets/anticoagulation; dual antiplatelet therapy indefinitely with aspirin 81 mg, clopidogrel 75 mg given multivessel PCI, diabetes, - May hold for elective procedures 5/15/2025  - Antihypertensives; carvedilol 12.5 mg twice daily, losartan 50 mg  - Dyslipidemia; rosuvastatin 40 mg  3.  Counseled the patient with regards to tobacco cessation and risks with continued abuse.   Exercise at least 150 minutes aerobic activity per week as tolerated.   Emphasized the need for low salt, low fat, low cholesterol diet.   Carry medication list at all times.   Carry stent card at all times.   4. Return to clinic in 6 and 12 months upon completion of aforementioned studies or sooner for concerns.  All questions were answered to the patient's satisfaction and to the best of my abilities.      SUBJECTIVE:   Vijay Wilde is a 58 year old male who presents on  5/15/2025 for a revisit and originally seen 11/7/2021 for STEMI at [facility].  He has history of CAD/STEMI 11/7/2021 s/p Mid LCX Resolute 3 x 15 mm KEARA; Mid LAD Resolute 3 x 15 mm and 3 x 18 mm KEARA; Mid RCA Resolute 3 x 18 mm KEARA; Right Dom, EF 55%, LVEDP 18 mmHg via RRA at [facility] Dr. Martínez for STEMI culprit LCX by EKG; DM, HTN, HLD, history of tobacco;  4/2023 prior RLE posterior calf vein DVT no longer on Eliquis     See prior notes for details    Interim Summary: 5/15/2025    No hospitalizations    Awaiting possible trigger finger surgery    Awaiting further evaluation with orthopedics    From cardiovascular standpoint no new issues of chest pain, chest pressure.  Does note lower extremity swelling and edema at times no recent episodes of cellulitis or pain with previous history of DVT.  No claudication no rest pain no nocturnal discomfort.  Renew vein mapping    No GIB (hematemesis, hematochezia, melena), hematuria, epistaxis nor excessive bleeding/bruising.    Denies any chest pain or shortness of breath at rest or with ambulation. Denies any dizziness or lightheadedness. Denies any palpitations, skipped beats or fluttering sensation. Denies any PND or orthopnea. Denies being awoken in the middle of the night with chest pain, shortness of breath or chest pain. Appetite is good.  Energy level good.    Wife Celia not present on today's visit    Body mass index is 36.42 kg/m².    Past Medical History:   Diagnosis Date   • AVN (avascular necrosis of bone)  (CMD) 10/2020    left hip; s/p hip replacement 6/2021   • Blood clot associated with vein wall inflammation    • CAD S/P percutaneous coronary angioplasty 11/07/2021 11/7/2021 s/p Mid LCX Resolute 3 x 15 mm KEARA; Mid LAD Resolute 3 x 15 mm and 3 x 18 mm KEARA; Mid RCA Resolute 3 x 18 mm KEARA; Right Dom, EF 55%, LVEDP 18 mmHg via RRA at [facility] Dr. Martínez for STEMI culprit LCX by EKG   • Carpal tunnel syndrome of left wrist    • Cervical radiculopathy    • DVT (deep venous thrombosis)  (CMD) 04/20/2023 4/20/2023 Right posterior tibial vein DVT   • Hard of hearing    • High cholesterol    • History of lumbar surgery 2005   • History of ST elevation myocardial infarction (STEMI) 11/07/2021 11/7/2021 s/p Mid LCX Resolute 3 x 15 mm KEARA; Mid LAD Resolute 3 x 15 mm and 3 x 18 mm KEARA; Mid RCA Resolute 3 x 18 mm KEARA;  Right Dom, EF 55%, LVEDP 18 mmHg via RRA at [facility] Dr. Martínez for STEMI culprit LCX by EKG   • HTN (hypertension)    • Lung nodules    • YU (obstructive sleep apnea)    • Type 2 diabetes mellitus (CMD)           Past Surgical History:   Procedure Laterality Date   • Coronary angioplasty with stent placement  11/07/2021 11/7/2021 s/p Mid LCX Resolute 3 x 15 mm KEARA; Mid LAD Resolute 3 x 15 mm and 3 x 18 mm KEARA; Mid RCA Resolute 3 x 18 mm KEARA; Right Dom, EF 55%, LVEDP 18 mmHg via RRA at [facility] Dr. Martínez for STEMI culprit LCX by EKG   • Fracture surgery     • Joint replacement     • Shoulder surgery Right    • Total hip replacement Bilateral        DEPRESSION ASSESSMENT/PLAN:  Depression screening is negative no further plan needed.    Allergies:   ALLERGIES:  No Known Allergies    Medications:   Current Outpatient Medications   Medication Sig Dispense Refill   • carvedilol (COREG) 12.5 MG tablet Take 1 tablet by mouth in the morning and 1 tablet in the evening. 180 tablet 3   • losartan (COZAAR) 50 MG tablet Take 1 tablet by mouth daily. 90 tablet 3   • rosuvastatin (CRESTOR) 40 MG tablet Take 1 tablet by mouth daily. 90 tablet 3   • clopidogrel (PLAVIX) 75 MG tablet Take 1 tablet by mouth daily. 90 tablet 3   • HYDROcodone-acetaminophen (NORCO) 5-325 MG per tablet Take 1 tablet by mouth.     • aspirin (Aspirin Childrens) 81 MG chewable tablet Chew 1 tablet by mouth daily.     • meloxicam (Mobic) 15 MG tablet Take 1 tablet by mouth daily. 20 tablet 0   • cyclobenzaprine (FLEXERIL) 10 MG tablet Take 1 tablet by mouth at bedtime as needed for Muscle spasms. 20 tablet 0   • exenatide ER (Bydureon BCise) 2 MG/0.85ML auto injector Indications: Type 2 Diabetes INJECT 0.85 ML INTO THE SKIN EVERY 7 DAYS FOR TYPE 2 DIABETES 4 mL 2   • lidocaine (LIDOCARE) 4 % patch Apply 1 patch topically. (Patient not taking: Reported on 5/15/2025)     • metformin (GLUCOPHAGE) 500 MG tablet Take 1 tablet by mouth in the morning and 1 tablet  in the evening. Take with meals. 180 tablet 3   • glimepiride (AMARYL) 4 MG tablet TAKE 2 TABLETS BY MOUTH DAILY BEFORE BREAKFAST 180 tablet 3   • OneTouch Verio test strip TEST TWICE DAILY 200 strip 1     No current facility-administered medications for this visit.       Social History:   Social History     Socioeconomic History   • Marital status: /Civil Union     Spouse name: Not on file   • Number of children: Not on file   • Years of education: Not on file   • Highest education level: Not on file   Occupational History   • Not on file   Tobacco Use   • Smoking status: Former     Current packs/day: 0.00     Average packs/day: 1 pack/day for 37.0 years (37.0 ttl pk-yrs)     Types: Cigarettes     Start date:      Quit date: 2023     Years since quittin.3   • Smokeless tobacco: Never   Vaping Use   • Vaping status: Some Days   • Substances: CBD   Substance and Sexual Activity   • Alcohol use: Yes     Comment: on occassion   • Drug use: Yes     Types: Marijuana     Comment: smokes it   • Sexual activity: Yes   Other Topics Concern   • Not on file   Social History Narrative    Works          Social Drivers of Health     Financial Resource Strain: Medium Risk (2024)    Financial Resource Strain    • Unable to Get: Medicine or Any Health Care (Medical, Dental, Mental Health, Vision)   Food Insecurity: Medium Risk (2025)    Food Insecurity    • Worried about Food: Sometimes true    • Food is Gone: Sometimes true   Transportation Needs: Not At Risk (2025)    Transportation Needs    • Lack of Reliable Transportation: No   Physical Activity: Medium Risk (2024)    Exercise Vital Sign    • Days of Exercise per Week: 5 days    • Minutes of Exercise per Session: 10 min   Stress: High Risk (2024)    Stress    • How Stressed: Quite a bit   Social Connections: Medium Risk (2024)    Social Connections    • Social Connectivity: 1 or 2 times a week   Feeling safe in your  relationship: Not on file       Family History:   Family History   Problem Relation Age of Onset   • Blood Disorder Mother    • Stroke/TIA Father    • Diabetes Father    • Diabetes Sister        ROS  Review of Systems   Constitutional: Negative.    HENT: Negative.     Eyes: Negative.    Respiratory: Negative.     Cardiovascular:  Positive for leg swelling.   Gastrointestinal: Negative.    Endocrine: Negative.    Genitourinary: Negative.    Musculoskeletal: Negative.    Skin: Negative.    Allergic/Immunologic: Negative.    Neurological: Negative.    Hematological: Negative.    Psychiatric/Behavioral: Negative.         OBJECTIVE:  Visit Vitals  /60   Pulse 69   Resp 18   Ht 5' 11\" (1.803 m)   Wt 118.4 kg (261 lb 1.6 oz)   SpO2 95%   BMI 36.42 kg/m²         Physical Exam  Physical Exam  Vitals and nursing note reviewed.   Constitutional:       Appearance: He is well-developed.   HENT:      Head: Normocephalic and atraumatic.   Eyes:      Conjunctiva/sclera: Conjunctivae normal.      Pupils: Pupils are equal, round, and reactive to light.   Cardiovascular:      Rate and Rhythm: Normal rate and regular rhythm.      Pulses: Normal pulses.           Carotid pulses are 2+ on the right side and 2+ on the left side.       Radial pulses are 2+ on the right side and 2+ on the left side.        Dorsalis pedis pulses are 2+ on the right side and 2+ on the left side.        Posterior tibial pulses are 2+ on the right side and 2+ on the left side.      Heart sounds: Normal heart sounds.      Comments: Radial Misael's test  Venous Insufficiency CEAP II    Pulmonary:      Effort: Pulmonary effort is normal.      Breath sounds: Normal breath sounds.   Abdominal:      General: Bowel sounds are normal.      Palpations: Abdomen is soft.   Musculoskeletal:         General: Normal range of motion.      Cervical back: Normal range of motion and neck supple.      Right lower leg: No edema.      Left lower leg: No edema.   Skin:      General: Skin is warm and dry.   Neurological:      Mental Status: He is alert and oriented to person, place, and time.         Lab / Testing:    The following labs and diagnostic studies and EKG's were reviewed by me independently interpreted and discussed with the patient. Refer to individual report(s) for complete details.    Lab Services on 04/14/2025   Component Date Value Ref Range Status   • Hemoglobin A1C 04/14/2025 7.9 (H)  4.5 - 5.6 % Final      Diabetic Screening  Non Diabetic:             <5.7%  Increased Risk:           5.7-6.4%  Diagnostic For Diabetes:  >6.4%    Diabetic Control  A1C%       eAG mg/dL  6.0            126  6.5            140  7.0            154  7.5            169  8.0            183  8.5            197  9.0            212  9.5            226  10.0           240   • Fasting Status 04/14/2025 10  0 - 999 Hours Final   • Sodium 04/14/2025 140  135 - 145 mmol/L Final   • Potassium 04/14/2025 4.4  3.4 - 5.1 mmol/L Final   • Chloride 04/14/2025 103  97 - 110 mmol/L Final   • Carbon Dioxide 04/14/2025 25  21 - 32 mmol/L Final   • Anion Gap 04/14/2025 16  7 - 19 mmol/L Final   • Glucose 04/14/2025 153 (H)  70 - 99 mg/dL Final   • BUN 04/14/2025 15  6 - 20 mg/dL Final   • Creatinine 04/14/2025 0.61 (L)  0.67 - 1.17 mg/dL Final   • Glomerular Filtration Rate 04/14/2025 >90  >=60 Final    eGFR results = or >60 mL/min/1.73m2 = Normal kidney function. Estimated GFR calculated using the CKD-EPI-R (2021) equation that does not include race in the creatinine calculation.   • BUN/Cr 04/14/2025 25  7 - 25 Final   • Calcium 04/14/2025 9.0  8.4 - 10.2 mg/dL Final   • Bilirubin, Total 04/14/2025 0.3  0.2 - 1.0 mg/dL Final   • GOT/AST 04/14/2025 19  <=37 Units/L Final   • GPT/ALT 04/14/2025 44  <64 Units/L Final   • Alkaline Phosphatase 04/14/2025 83  45 - 117 Units/L Final   • Albumin 04/14/2025 3.5  3.4 - 5.0 g/dL Final   • Protein, Total 04/14/2025 7.0  6.4 - 8.2 g/dL Final   • Globulin 04/14/2025 3.5   2.0 - 4.0 g/dL Final   • A/G Ratio 04/14/2025 1.0  1.0 - 2.4 Final   • Cholesterol 04/14/2025 120  <=199 mg/dL Final      Desirable         <200  Borderline High   200 to 239  High              >=240   • Triglycerides 04/14/2025 100  <=149 mg/dL Final      Normal            <150  Borderline High   150 to 199  High              200 to 499  Very High         >=500   • HDL 04/14/2025 52  >=40 mg/dL Final      Low              <40  Borderline Low   40 to 49  Near Optimal     50 to 59  Optimal          >=60   • LDL 04/14/2025 48  <=129 mg/dL Final      Optimal           <100  Near Optimal      100 to 129  Borderline High   130 to 159  High              160 to 189  Very High         >=190   • Non-HDL Cholesterol 04/14/2025 68  mg/dL Final      Therapeutic Target:  CHD and risk equivalents  <130  Multiple risk factors     <160  0 to 1 risk factor        <190   • Cholesterol/ HDL Ratio 04/14/2025 2.3  <=4.4 Final   • Microalbumin, Urine 04/14/2025 1.71  mg/dL Final   • Creatinine, Urine 04/14/2025 103.7  mg/dL Final   • Microalbumin/ Creatinine Ratio 04/14/2025 16.5  <30.0 mg/g Final   Hospital Outpatient Visit on 01/24/2025   Component Date Value Ref Range Status   • Glucose Blood, POC 01/24/2025 185 (A)  65 - 99 mg/dL Final   • FASTING STATUS, POC 01/24/2025 yes   Final   • CULTURE WITH GRAM STAIN, ANAEROBE/* 01/24/2025 Rare Diphtheroids (A)   Final   • Gram Stain 01/24/2025 Rare Polymorphonuclear cells.   Final   • Gram Stain 01/24/2025 Rare Epithelial cells.   Final   • Gram Stain 01/24/2025 Few Gram positive bacilli.   Final   Hospital Outpatient Visit on 12/02/2024   Component Date Value Ref Range Status   • Glucose Blood, POC 12/02/2024 225 (A)  65 - 99 mg/dL Final   • FASTING STATUS, POC 12/02/2024 yes   Final   Lab Services on 11/29/2024   Component Date Value Ref Range Status   • Sodium 11/29/2024 139  135 - 145 mmol/L Final   • Potassium 11/29/2024 4.7  3.4 - 5.1 mmol/L Final   • Chloride 11/29/2024 101  97 - 110  mmol/L Final   • Carbon Dioxide 11/29/2024 29  21 - 32 mmol/L Final   • Anion Gap 11/29/2024 14  7 - 19 mmol/L Final   • Glucose 11/29/2024 234 (H)  70 - 99 mg/dL Final   • BUN 11/29/2024 19  6 - 20 mg/dL Final   • Creatinine 11/29/2024 0.57 (L)  0.67 - 1.17 mg/dL Final   • Glomerular Filtration Rate 11/29/2024 >90  >=60 Final    eGFR results = or >60 mL/min/1.73m2 = Normal kidney function. Estimated GFR calculated using the CKD-EPI-R (2021) equation that does not include race in the creatinine calculation.   • BUN/Cr 11/29/2024 33 (H)  7 - 25 Final   • Calcium 11/29/2024 9.7  8.4 - 10.2 mg/dL Final   • WBC 11/29/2024 8.5  4.2 - 11.0 K/mcL Final   • RBC 11/29/2024 4.87  4.50 - 5.90 mil/mcL Final   • HGB 11/29/2024 14.6  13.0 - 17.0 g/dL Final   • HCT 11/29/2024 44.3  39.0 - 51.0 % Final   • MCV 11/29/2024 91.0  78.0 - 100.0 fl Final   • MCH 11/29/2024 30.0  26.0 - 34.0 pg Final   • MCHC 11/29/2024 33.0  32.0 - 36.5 g/dL Final   • RDW-CV 11/29/2024 13.6  11.0 - 15.0 % Final   • RDW-SD 11/29/2024 45.5  39.0 - 50.0 fL Final   • PLT 11/29/2024 251  140 - 450 K/mcL Final   • NRBC 11/29/2024 0  <=0 /100 WBC Final   • Neutrophil, Percent 11/29/2024 67  % Final   • Lymphocytes, Percent 11/29/2024 21  % Final   • Mono, Percent 11/29/2024 9  % Final   • Eosinophils, Percent 11/29/2024 2  % Final   • Basophils, Percent 11/29/2024 1  % Final   • Immature Granulocytes 11/29/2024 0  % Final   • Absolute Neutrophils 11/29/2024 5.7  1.8 - 7.7 K/mcL Final   • Absolute Lymphocytes 11/29/2024 1.8  1.0 - 4.0 K/mcL Final   • Absolute Monocytes 11/29/2024 0.7  0.3 - 0.9 K/mcL Final   • Absolute Eosinophils  11/29/2024 0.2  0.0 - 0.5 K/mcL Final   • Absolute Basophils 11/29/2024 0.1  0.0 - 0.3 K/mcL Final   • Absolute Immature Granulocytes 11/29/2024 0.0  0.0 - 0.2 K/mcL Final   Lab Services on 09/26/2024   Component Date Value Ref Range Status   • Hemoglobin A1C 09/26/2024 10.1 (H)  4.5 - 5.6 % Final      Diabetic Screening  Non  Diabetic:             <5.7%  Increased Risk:           5.7-6.4%  Diagnostic For Diabetes:  >6.4%    Diabetic Control  A1C%       eAG mg/dL  6.0            126  6.5            140  7.0            154  7.5            169  8.0            183  8.5            197  9.0            212  9.5            226  10.0           240   • Fasting Status 09/26/2024 12  0 - 999 Hours Final   • Sodium 09/26/2024 139  135 - 145 mmol/L Final   • Potassium 09/26/2024 4.5  3.4 - 5.1 mmol/L Final   • Chloride 09/26/2024 101  97 - 110 mmol/L Final   • Carbon Dioxide 09/26/2024 30  21 - 32 mmol/L Final   • Anion Gap 09/26/2024 13  7 - 19 mmol/L Final   • Glucose 09/26/2024 223 (H)  70 - 99 mg/dL Final   • BUN 09/26/2024 16  6 - 20 mg/dL Final   • Creatinine 09/26/2024 0.60 (L)  0.67 - 1.17 mg/dL Final   • Glomerular Filtration Rate 09/26/2024 >90  >=60 Final    eGFR results = or >60 mL/min/1.73m2 = Normal kidney function. Estimated GFR calculated using the CKD-EPI-R (2021) equation that does not include race in the creatinine calculation.   • BUN/Cr 09/26/2024 27 (H)  7 - 25 Final   • Calcium 09/26/2024 8.8  8.4 - 10.2 mg/dL Final   • Bilirubin, Total 09/26/2024 0.4  0.2 - 1.0 mg/dL Final   • GOT/AST 09/26/2024 22  <=37 Units/L Final   • GPT/ALT 09/26/2024 50  <64 Units/L Final   • Alkaline Phosphatase 09/26/2024 83  45 - 117 Units/L Final   • Albumin 09/26/2024 3.3 (L)  3.4 - 5.0 g/dL Final   • Protein, Total 09/26/2024 7.1  6.4 - 8.2 g/dL Final   • Globulin 09/26/2024 3.8  2.0 - 4.0 g/dL Final   • A/G Ratio 09/26/2024 0.9 (L)  1.0 - 2.4 Final   • Cholesterol 09/26/2024 153  <=199 mg/dL Final      Desirable         <200  Borderline High   200 to 239  High              >=240   • Triglycerides 09/26/2024 105  <=149 mg/dL Final      Normal            <150  Borderline High   150 to 199  High              200 to 499  Very High         >=500   • HDL 09/26/2024 67  >=40 mg/dL Final      Low              <40  Borderline Low   40 to 49  Near Optimal      50 to 59  Optimal          >=60   • LDL 09/26/2024 65  <=129 mg/dL Final      Optimal           <100  Near Optimal      100 to 129  Borderline High   130 to 159  High              160 to 189  Very High         >=190   • Non-HDL Cholesterol 09/26/2024 86  mg/dL Final      Therapeutic Target:  CHD and risk equivalents  <130  Multiple risk factors     <160  0 to 1 risk factor        <190   • Cholesterol/ HDL Ratio 09/26/2024 2.3  <=4.4 Final   • Microalbumin, Urine 09/26/2024 5.05  mg/dL Final   • Creatinine, Urine 09/26/2024 105.15  mg/dL Final   • Microalbumin/ Creatinine Ratio 09/26/2024 48.0 (H)  <30.0 mg/g Final    Short term hyperglycemia, exercise, urinary tract infections, marked hypertension, heart failure, and acute febrile illness can cause transient elevations in urinary albumin excretion. Due to marked day-to-day variability in albumin excretion, at least two of the three collections done in a 3 to 6 month period should show elevated levels before initially designating a patient as having microalbuminuria.   • Prostate Specific Antigen 09/26/2024 0.52  <=3.50 ng/mL Final    [employer] Chemiluminescence. Results obtained with different assay methods or kits cannot be used interchangeably.     Lab Services on 05/30/2024   Component Date Value Ref Range Status   • Hemoglobin A1C 05/30/2024 10.0 (H)  4.5 - 5.6 % Final      Diabetic Screening  Non Diabetic:             <5.7%  Increased Risk:           5.7-6.4%  Diagnostic For Diabetes:  >6.4%    Diabetic Control  A1C%       eAG mg/dL  6.0            126  6.5            140  7.0            154  7.5            169  8.0            183  8.5            197  9.0            212  9.5            226  10.0           240   • Cholesterol 05/30/2024 103  <=199 mg/dL Final      Desirable         <200  Borderline High   200 to 239  High              >=240   • Triglycerides 05/30/2024 58  <=149 mg/dL Final      Normal            <150  Borderline High   150 to 199  High               200 to 499  Very High         >=500   • HDL 05/30/2024 46  >=40 mg/dL Final      Low              <40  Borderline Low   40 to 49  Near Optimal     50 to 59  Optimal          >=60   • LDL 05/30/2024 45  <=129 mg/dL Final      Optimal           <100  Near Optimal      100 to 129  Borderline High   130 to 159  High              160 to 189  Very High         >=190   • Non-HDL Cholesterol 05/30/2024 57  mg/dL Final      Therapeutic Target:  CHD and risk equivalents  <130  Multiple risk factors     <160  0 to 1 risk factor        <190   • Cholesterol/ HDL Ratio 05/30/2024 2.2  <=4.4 Final   • Fasting Status 05/30/2024 12  0 - 999 Hours Final   • Sodium 05/30/2024 138  135 - 145 mmol/L Final   • Potassium 05/30/2024 4.9  3.4 - 5.1 mmol/L Final   • Chloride 05/30/2024 102  97 - 110 mmol/L Final   • Carbon Dioxide 05/30/2024 28  21 - 32 mmol/L Final   • Anion Gap 05/30/2024 13  7 - 19 mmol/L Final   • Glucose 05/30/2024 221 (H)  70 - 99 mg/dL Final   • BUN 05/30/2024 12  6 - 20 mg/dL Final   • Creatinine 05/30/2024 0.67  0.67 - 1.17 mg/dL Final   • Glomerular Filtration Rate 05/30/2024 >90  >=60 Final    eGFR results = or >60 mL/min/1.73m2 = Normal kidney function. Estimated GFR calculated using the CKD-EPI-R (2021) equation that does not include race in the creatinine calculation.   • BUN/Cr 05/30/2024 18  7 - 25 Final   • Calcium 05/30/2024 9.2  8.4 - 10.2 mg/dL Final   • Bilirubin, Total 05/30/2024 0.5  0.2 - 1.0 mg/dL Final   • GOT/AST 05/30/2024 26  <=37 Units/L Final   • GPT/ALT 05/30/2024 44  <64 Units/L Final   • Alkaline Phosphatase 05/30/2024 83  45 - 117 Units/L Final   • Albumin 05/30/2024 3.4 (L)  3.6 - 5.1 g/dL Final   • Protein, Total 05/30/2024 7.1  6.4 - 8.2 g/dL Final   • Globulin 05/30/2024 3.7  2.0 - 4.0 g/dL Final   • A/G Ratio 05/30/2024 0.9 (L)  1.0 - 2.4 Final   • Microalbumin, Urine 05/30/2024 9.86  mg/dL Final   • Creatinine, Urine 05/30/2024 131.50  mg/dL Final   • Microalbumin/  Creatinine Ratio 05/30/2024 75.0 (H)  <30.0 mg/g Final    Short term hyperglycemia, exercise, urinary tract infections, marked hypertension, heart failure, and acute febrile illness can cause transient elevations in urinary albumin excretion. Due to marked day-to-day variability in albumin excretion, at least two of the three collections done in a 3 to 6 month period should show elevated levels before initially designating a patient as having microalbuminuria.     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 Left ventricle: The cavity size is normal. Wall thickness is mildly     increased. Systolic function is normal. The ejection fraction was measured     by biplane method of disks. Wall motion is normal; there are no regional     wall motion abnormalities. Left ventricular diastolic function is     indeterminate. The ejection fraction is 61%.  2. Mitral valve: There is trace regurgitation.  3. Right ventricle: The cavity size is normal. Wall thickness is normal.     Systolic function is normal.  4. Tricuspid valve: There is trace regurgitation.  5. Pulmonary arteries: Systolic pressure is within the normal range, estimated     to be 27mm Hg.    ECHO 11/8/2021 (independently interpreted and reviewed personally)  1. Left ventricle:  The cavity size is normal. There is moderate      concentric hypertrophy. Systolic function is normal. The      estimated ejection fraction is 55%. Grade I diastolic      dysfunction with normal left atrial pressure (According to ASE      2016 guidelines).   2. Regional wall motion abnormality: Mild hypokinesis of the basal      anterior, mid anterolateral, and apical lateral myocardium.   3. Systemic veins: The right heart pressure is normal.     Recommendations:  Recommend limited echocardiogram with definity     ECHO 1/6/2021 (independently interpreted and reviewed personally)  1. Left ventricle: The cavity size is normal. Wall thickness is mildly     increased. Systolic function is normal. Wall motion is normal; there     are no regional wall motion abnormalities. Left ventricular diastolic     function parameters are normal. The ejection fraction is 64%.  2. Pulmonary arteries: Systolic pressure is indeterminate due to the     absence of measurable tricuspid regurgitation.    Screening KEREN 2/21/2023 (independently interpreted and reviewed personally)  Right        PT 1.13 triphasic         DP 1.10 triphasic     Left         PT 1.16 triphasic         DP 1.09 triphasic     Conclusions:  Normal KEREN    Venous mapping with reflux 4/12/2023 (independently interpreted and reviewed personally)  1. There is no evidence of acute or chronic deep vein or superficial vein     thrombosis in the right lower extremity and left lower extremity where     visualized.  2. There is no evidence of deep vein insufficiency in the right lower     extremity and left lower extremity where visualized.  3. There is no evidence of venous insufficiency in the left great saphenous     vein.  4. There is no evidence of venous insufficiency in the right anterior     accessory saphenous vein and left anterior accessory saphenous vein.  5. There is no evidence of venous insufficiency in the right small saphenous     vein and left small saphenous  vein.  6. Severe reflux in the right calf great saphenous vein.    CT chest 4/20/2023 (independently interpreted and reviewed personally)  No pulmonary embolism      Electronically Signed by:    Felix Martínez DO, [facility], [facility] 5/15/2025    [This note used Dragon technology. Transcription errors are not uncommon and may not have been corrected prior to electronically signing the note. Should you find these errors, please consult the clinician for interpretation (or apply common sense adjustment when safe and appropriate).]    Note to patient: The 21st Century Cures Act makes medical notes like these available to patients in the interest of transparency. However, this is a medical document intended as peer to peer communication. It is written in medical language and may contain abbreviations or verbiage that are unfamiliar.  It may appear blunt or direct. Medical documents are intended to carry relevant information, facts as evident, and the clinical impression/opinion of the practitioner      no

## 2025-05-28 NOTE — PROGRESS NOTE ADULT - SUBJECTIVE AND OBJECTIVE BOX
Spoke with pt. Appointment time changed from 10 am to 1:30 pm on 06/04/25. Pt was thankful. Call ended well.    Edson Manzo MD  [employer]  Pager [phone]  Email: [email or web address]  Available on [employer] Teams        PROGRESS NOTE:     Patient is a 69y old  Male who presents with a chief complaint of nausea and vomiting (10 May 2024 10:43)      SUBJECTIVE / OVERNIGHT EVENTS:  Patient seen and examined this morning while getting dialyzed. Another episode of hypoglycemia this morning.   ADDITIONAL REVIEW OF SYSTEMS:  Review of system unobtainable secondary to mental status.    MEDICATIONS  (STANDING):  albuterol/ipratropium for Nebulization 3 milliLiter(s) Nebulizer every 6 hours  ascorbic acid 500 milliGRAM(s) Oral daily  aspirin enteric coated 81 milliGRAM(s) Oral daily  atorvastatin 40 milliGRAM(s) Oral at bedtime  carvedilol 6.25 milliGRAM(s) Oral every 12 hours  chlorhexidine 2% Cloths 1 Application(s) Topical daily  dextrose 50% Injectable 25 Gram(s) IV Push once  dextrose 50% Injectable 12.5 Gram(s) IV Push once  dextrose 50% Injectable 25 Gram(s) IV Push once  dextrose Oral Gel 15 Gram(s) Oral once  glucagon  Injectable 1 milliGRAM(s) IntraMuscular once  heparin   Injectable 5000 Unit(s) SubCutaneous every 12 hours  lactobacillus acidophilus 1 Tablet(s) Oral daily  lactulose Syrup 10 Gram(s) Oral daily  Nephro-hannah 1 Tablet(s) Oral daily  pantoprazole    Tablet 40 milliGRAM(s) Oral before breakfast  polyethylene glycol 3350 17 Gram(s) Oral daily  senna 2 Tablet(s) Oral at bedtime  sodium chloride 3%  Inhalation 4 milliLiter(s) Inhalation every 6 hours    MEDICATIONS  (PRN):  acetaminophen     Tablet .. 650 milliGRAM(s) Oral every 6 hours PRN Temp greater or equal to 38C (100.4F), Mild Pain (1 - 3)  aluminum hydroxide/magnesium hydroxide/simethicone Suspension 30 milliLiter(s) Oral every 4 hours PRN Dyspepsia  melatonin 3 milliGRAM(s) Oral at bedtime PRN Insomnia  ondansetron Injectable 4 milliGRAM(s) IV Push every 8 hours PRN Nausea and/or Vomiting  oxyCODONE    IR 5 milliGRAM(s) Oral every 6 hours PRN moderate to severe pain  sodium chloride 0.9% Bolus. 100 milliLiter(s) IV Bolus every 5 minutes PRN SBP LESS THAN or EQUAL to 80 mmHg      CAPILLARY BLOOD GLUCOSE      POCT Blood Glucose.: 103 mg/dL (10 May 2024 12:05)  POCT Blood Glucose.: 275 mg/dL (10 May 2024 07:47)  POCT Blood Glucose.: 67 mg/dL (10 May 2024 07:19)  POCT Blood Glucose.: 67 mg/dL (10 May 2024 07:17)  POCT Blood Glucose.: 131 mg/dL (09 May 2024 21:46)  POCT Blood Glucose.: 87 mg/dL (09 May 2024 16:31)    I&O's Summary    09 May 2024 07:01  -  10 May 2024 07:00  --------------------------------------------------------  IN: 100 mL / OUT: 0 mL / NET: 100 mL        PHYSICAL EXAM:  Vital Signs Last 24 Hrs  T(C): 36.2 (10 May 2024 09:45), Max: 36.9 (09 May 2024 21:15)  T(F): 97.2 (10 May 2024 09:45), Max: 98.5 (09 May 2024 21:15)  HR: 66 (10 May 2024 09:45) (60 - 70)  BP: 105/54 (10 May 2024 09:45) (105/54 - 125/65)  BP(mean): --  RR: 17 (10 May 2024 09:45) (17 - 18)  SpO2: 100% (10 May 2024 09:20) (97% - 100%)    Parameters below as of 10 May 2024 09:45  Patient On (Oxygen Delivery Method): room air          GENERAL: NAD, lying in bed, awake, interactive however, short answers, very confused  CHEST/LUNG: No use of accessory muscles, CTAB, breathing non-labored  COR: RR, no mrcg  ABD: Soft, ND/NT, +BS  PSYCH: AAOxself  SKIN: No rashes or lesions  EXT: wwp, s/p b/l AKA and BKA    LABS:                        10.9   2.90  )-----------( 200      ( 10 May 2024 04:58 )             34.9     05-10    132<L>  |  93<L>  |  18  ----------------------------<  49<LL>  5.2   |  25  |  4.22<H>    Ca    9.0      10 May 2024 04:58  Phos  3.4     05-10  Mg     2.70     05-10    TPro  7.3  /  Alb  3.0<L>  /  TBili  0.2  /  DBili  <0.2  /  AST  23  /  ALT  9   /  AlkPhos  85  05-09          Urinalysis Basic - ( 10 May 2024 04:58 )    Color: x / Appearance: x / SG: x / pH: x  Gluc: 49 mg/dL / Ketone: x  / Bili: x / Urobili: x   Blood: x / Protein: x / Nitrite: x   Leuk Esterase: x / RBC: x / WBC x   Sq Epi: x / Non Sq Epi: x / Bacteria: x          RADIOLOGY & ADDITIONAL TESTS:  Results Reviewed:   Imaging Personally Reviewed:  Electrocardiogram Personally Reviewed:    COORDINATION OF CARE:  Care Discussed with Consultants/Other Providers [Y/N]:  Prior or Outpatient Records Reviewed [Y/N]: